# Patient Record
Sex: FEMALE | Race: WHITE | Employment: UNEMPLOYED | URBAN - METROPOLITAN AREA
[De-identification: names, ages, dates, MRNs, and addresses within clinical notes are randomized per-mention and may not be internally consistent; named-entity substitution may affect disease eponyms.]

---

## 2017-01-10 PROBLEM — E87.6 HYPOKALEMIA: Status: ACTIVE | Noted: 2017-01-10

## 2017-01-10 PROBLEM — Z72.0 TOBACCO ABUSE: Status: ACTIVE | Noted: 2017-01-10

## 2017-01-10 PROBLEM — N39.0 UTI (URINARY TRACT INFECTION): Status: ACTIVE | Noted: 2017-01-10

## 2017-01-10 PROBLEM — A41.9 SEPSIS (HCC): Status: ACTIVE | Noted: 2017-01-10

## 2017-01-10 PROBLEM — J96.21 ACUTE ON CHRONIC RESPIRATORY FAILURE WITH HYPOXIA (HCC): Status: ACTIVE | Noted: 2017-01-10

## 2017-01-10 PROBLEM — N17.9 AKI (ACUTE KIDNEY INJURY) (HCC): Status: ACTIVE | Noted: 2017-01-10

## 2017-01-12 PROBLEM — J96.01 ACUTE RESPIRATORY FAILURE WITH HYPOXIA (HCC): Status: ACTIVE | Noted: 2017-01-10

## 2017-01-19 ENCOUNTER — OFFICE VISIT (OUTPATIENT)
Dept: PAIN MANAGEMENT | Age: 72
End: 2017-01-19

## 2017-01-19 VITALS
SYSTOLIC BLOOD PRESSURE: 106 MMHG | WEIGHT: 154 LBS | BODY MASS INDEX: 26.43 KG/M2 | DIASTOLIC BLOOD PRESSURE: 62 MMHG | HEART RATE: 90 BPM

## 2017-01-19 DIAGNOSIS — M51.36 DDD (DEGENERATIVE DISC DISEASE), LUMBAR: ICD-10-CM

## 2017-01-19 DIAGNOSIS — M79.7 FIBROMYALGIA: ICD-10-CM

## 2017-01-19 DIAGNOSIS — G89.4 CHRONIC PAIN SYNDROME: ICD-10-CM

## 2017-01-19 DIAGNOSIS — M47.816 OSTEOARTHRITIS OF LUMBAR SPINE, UNSPECIFIED SPINAL OSTEOARTHRITIS COMPLICATION STATUS: ICD-10-CM

## 2017-01-19 PROCEDURE — 99213 OFFICE O/P EST LOW 20 MIN: CPT | Performed by: INTERNAL MEDICINE

## 2017-01-19 RX ORDER — DULOXETIN HYDROCHLORIDE 30 MG/1
CAPSULE, DELAYED RELEASE ORAL
Qty: 30 CAPSULE | Refills: 0 | Status: SHIPPED | OUTPATIENT
Start: 2017-01-19 | End: 2017-02-16 | Stop reason: SDUPTHER

## 2017-01-19 RX ORDER — AMITRIPTYLINE HYDROCHLORIDE 25 MG/1
TABLET, FILM COATED ORAL
Qty: 30 TABLET | Refills: 0 | Status: SHIPPED | OUTPATIENT
Start: 2017-01-19 | End: 2017-02-16 | Stop reason: SDUPTHER

## 2017-01-19 RX ORDER — TIZANIDINE 4 MG/1
TABLET ORAL
Qty: 30 TABLET | Refills: 0 | Status: SHIPPED | OUTPATIENT
Start: 2017-01-19 | End: 2017-02-16 | Stop reason: SDUPTHER

## 2017-01-19 RX ORDER — CALCIUM CARBONATE/VITAMIN D3 500-10/5ML
400 LIQUID (ML) ORAL 2 TIMES DAILY
Qty: 60 CAPSULE | Refills: 0 | Status: SHIPPED | OUTPATIENT
Start: 2017-01-19 | End: 2017-02-16 | Stop reason: SDUPTHER

## 2017-01-19 RX ORDER — DULOXETIN HYDROCHLORIDE 60 MG/1
CAPSULE, DELAYED RELEASE ORAL
Qty: 30 CAPSULE | Refills: 0 | Status: SHIPPED | OUTPATIENT
Start: 2017-01-19 | End: 2017-02-16 | Stop reason: SDUPTHER

## 2017-01-19 RX ORDER — MELOXICAM 15 MG/1
TABLET ORAL
Qty: 30 TABLET | Refills: 0 | Status: SHIPPED | OUTPATIENT
Start: 2017-01-19 | End: 2017-02-16 | Stop reason: SDUPTHER

## 2017-01-19 RX ORDER — GABAPENTIN 600 MG/1
600 TABLET ORAL 3 TIMES DAILY
Qty: 90 TABLET | Refills: 0 | Status: SHIPPED | OUTPATIENT
Start: 2017-01-19 | End: 2017-02-16 | Stop reason: SDUPTHER

## 2017-01-19 RX ORDER — OXYCODONE AND ACETAMINOPHEN 7.5; 325 MG/1; MG/1
1 TABLET ORAL EVERY 6 HOURS PRN
Qty: 112 TABLET | Refills: 0 | Status: SHIPPED | OUTPATIENT
Start: 2017-01-19 | End: 2017-02-16 | Stop reason: SDUPTHER

## 2017-01-23 ENCOUNTER — OFFICE VISIT (OUTPATIENT)
Dept: FAMILY MEDICINE CLINIC | Age: 72
End: 2017-01-23

## 2017-01-23 VITALS
OXYGEN SATURATION: 98 % | WEIGHT: 161 LBS | RESPIRATION RATE: 12 BRPM | DIASTOLIC BLOOD PRESSURE: 80 MMHG | SYSTOLIC BLOOD PRESSURE: 140 MMHG | BODY MASS INDEX: 27.64 KG/M2 | HEART RATE: 83 BPM

## 2017-01-23 DIAGNOSIS — J44.1 COPD EXACERBATION (HCC): Primary | ICD-10-CM

## 2017-01-23 DIAGNOSIS — R73.03 PREDIABETES: ICD-10-CM

## 2017-01-23 DIAGNOSIS — L60.0 INGROWN RIGHT GREATER TOENAIL: ICD-10-CM

## 2017-01-23 DIAGNOSIS — N39.0 URINARY TRACT INFECTION, SITE UNSPECIFIED: ICD-10-CM

## 2017-01-23 DIAGNOSIS — K21.9 GASTROESOPHAGEAL REFLUX DISEASE, ESOPHAGITIS PRESENCE NOT SPECIFIED: ICD-10-CM

## 2017-01-23 DIAGNOSIS — E78.00 HYPERCHOLESTEREMIA: ICD-10-CM

## 2017-01-23 LAB
BILIRUBIN, POC: NEGATIVE
BLOOD URINE, POC: ABNORMAL
CLARITY, POC: CLEAR
COLOR, POC: YELLOW
GLUCOSE URINE, POC: NEGATIVE
KETONES, POC: NEGATIVE
LEUKOCYTE EST, POC: ABNORMAL
NITRITE, POC: NEGATIVE
PH, POC: 7.5
PROTEIN, POC: NEGATIVE
SPECIFIC GRAVITY, POC: 1.01
UROBILINOGEN, POC: 1

## 2017-01-23 PROCEDURE — 99214 OFFICE O/P EST MOD 30 MIN: CPT | Performed by: NURSE PRACTITIONER

## 2017-01-23 PROCEDURE — 81002 URINALYSIS NONAUTO W/O SCOPE: CPT | Performed by: NURSE PRACTITIONER

## 2017-01-23 RX ORDER — ATORVASTATIN CALCIUM 80 MG/1
TABLET, FILM COATED ORAL
Qty: 30 TABLET | Refills: 2 | Status: SHIPPED | OUTPATIENT
Start: 2017-01-23 | End: 2017-06-13 | Stop reason: SDUPTHER

## 2017-01-23 RX ORDER — PANTOPRAZOLE SODIUM 20 MG/1
20 TABLET, DELAYED RELEASE ORAL DAILY
Qty: 30 TABLET | Refills: 1 | Status: SHIPPED | OUTPATIENT
Start: 2017-01-23 | End: 2017-03-24 | Stop reason: SDUPTHER

## 2017-01-23 ASSESSMENT — ENCOUNTER SYMPTOMS
VOMITING: 0
COUGH: 1
SHORTNESS OF BREATH: 1
ABDOMINAL PAIN: 0
DIARRHEA: 0
NAUSEA: 0

## 2017-01-24 LAB — URINE CULTURE, ROUTINE: NORMAL

## 2017-02-03 ENCOUNTER — TELEPHONE (OUTPATIENT)
Dept: FAMILY MEDICINE CLINIC | Age: 72
End: 2017-02-03

## 2017-02-06 DIAGNOSIS — J44.9 CHRONIC OBSTRUCTIVE PULMONARY DISEASE, UNSPECIFIED COPD TYPE (HCC): Primary | ICD-10-CM

## 2017-02-07 ENCOUNTER — OFFICE VISIT (OUTPATIENT)
Dept: PULMONOLOGY | Age: 72
End: 2017-02-07

## 2017-02-07 VITALS
SYSTOLIC BLOOD PRESSURE: 126 MMHG | HEART RATE: 87 BPM | TEMPERATURE: 98.1 F | DIASTOLIC BLOOD PRESSURE: 62 MMHG | WEIGHT: 159.8 LBS | BODY MASS INDEX: 27.28 KG/M2 | HEIGHT: 64 IN | RESPIRATION RATE: 20 BRPM | OXYGEN SATURATION: 92 %

## 2017-02-07 DIAGNOSIS — J44.9 COPD, SEVERE (HCC): Primary | ICD-10-CM

## 2017-02-07 DIAGNOSIS — F17.211 NICOTINE DEPENDENCE, CIGARETTES, IN REMISSION: ICD-10-CM

## 2017-02-07 PROCEDURE — 99214 OFFICE O/P EST MOD 30 MIN: CPT | Performed by: NURSE PRACTITIONER

## 2017-02-07 PROCEDURE — G0296 VISIT TO DETERM LDCT ELIG: HCPCS | Performed by: NURSE PRACTITIONER

## 2017-02-08 RX ORDER — FLUTICASONE FUROATE AND VILANTEROL 200; 25 UG/1; UG/1
1 POWDER RESPIRATORY (INHALATION) DAILY
Qty: 2 EACH | Refills: 0 | COMMUNITY
Start: 2017-02-08 | End: 2017-04-12 | Stop reason: SDUPTHER

## 2017-02-15 ENCOUNTER — HOSPITAL ENCOUNTER (OUTPATIENT)
Dept: CT IMAGING | Age: 72
Discharge: HOME | End: 2017-02-15
Attending: NURSE PRACTITIONER | Admitting: NURSE PRACTITIONER

## 2017-02-15 DIAGNOSIS — E78.00 HYPERCHOLESTEREMIA: ICD-10-CM

## 2017-02-15 DIAGNOSIS — J44.1 COPD EXACERBATION (HCC): ICD-10-CM

## 2017-02-15 DIAGNOSIS — F17.211 NICOTINE DEPENDENCE, CIGARETTES, IN REMISSION: ICD-10-CM

## 2017-02-15 DIAGNOSIS — R73.03 PREDIABETES: ICD-10-CM

## 2017-02-15 DIAGNOSIS — F17.211 CIGARETTE NICOTINE DEPENDENCE IN REMISSION: ICD-10-CM

## 2017-02-15 LAB
A/G RATIO: 1.9 (ref 1.1–2.2)
ALBUMIN SERPL-MCNC: 4.1 G/DL (ref 3.4–5)
ALP BLD-CCNC: 97 U/L (ref 40–129)
ALT SERPL-CCNC: 11 U/L (ref 10–40)
ANION GAP SERPL CALCULATED.3IONS-SCNC: 14 MMOL/L (ref 3–16)
AST SERPL-CCNC: 12 U/L (ref 15–37)
BASOPHILS ABSOLUTE: 0 K/UL (ref 0–0.2)
BASOPHILS RELATIVE PERCENT: 0.6 %
BILIRUB SERPL-MCNC: 0.5 MG/DL (ref 0–1)
BUN BLDV-MCNC: 18 MG/DL (ref 7–20)
CALCIUM SERPL-MCNC: 9.2 MG/DL (ref 8.3–10.6)
CHLORIDE BLD-SCNC: 104 MMOL/L (ref 99–110)
CHOLESTEROL, TOTAL: 188 MG/DL (ref 0–199)
CO2: 27 MMOL/L (ref 21–32)
CREAT SERPL-MCNC: 0.8 MG/DL (ref 0.6–1.2)
EOSINOPHILS ABSOLUTE: 0.1 K/UL (ref 0–0.6)
EOSINOPHILS RELATIVE PERCENT: 2.5 %
ESTIMATED AVERAGE GLUCOSE: 122.6 MG/DL
GFR AFRICAN AMERICAN: >60
GFR NON-AFRICAN AMERICAN: >60
GLOBULIN: 2.2 G/DL
GLUCOSE BLD-MCNC: 101 MG/DL (ref 70–99)
HBA1C MFR BLD: 5.9 %
HCT VFR BLD CALC: 38.9 % (ref 36–48)
HDLC SERPL-MCNC: 67 MG/DL (ref 40–60)
HEMOGLOBIN: 12.8 G/DL (ref 12–16)
LDL CHOLESTEROL CALCULATED: 97 MG/DL
LYMPHOCYTES ABSOLUTE: 1.5 K/UL (ref 1–5.1)
LYMPHOCYTES RELATIVE PERCENT: 26 %
MCH RBC QN AUTO: 31.4 PG (ref 26–34)
MCHC RBC AUTO-ENTMCNC: 32.9 G/DL (ref 31–36)
MCV RBC AUTO: 95.4 FL (ref 80–100)
MONOCYTES ABSOLUTE: 0.4 K/UL (ref 0–1.3)
MONOCYTES RELATIVE PERCENT: 6.3 %
NEUTROPHILS ABSOLUTE: 3.8 K/UL (ref 1.7–7.7)
NEUTROPHILS RELATIVE PERCENT: 64.6 %
PDW BLD-RTO: 14.4 % (ref 12.4–15.4)
PLATELET # BLD: 314 K/UL (ref 135–450)
PMV BLD AUTO: 7.1 FL (ref 5–10.5)
POTASSIUM SERPL-SCNC: 4.7 MMOL/L (ref 3.5–5.1)
RBC # BLD: 4.07 M/UL (ref 4–5.2)
SODIUM BLD-SCNC: 145 MMOL/L (ref 136–145)
TOTAL PROTEIN: 6.3 G/DL (ref 6.4–8.2)
TRIGL SERPL-MCNC: 118 MG/DL (ref 0–150)
VLDLC SERPL CALC-MCNC: 24 MG/DL
WBC # BLD: 5.9 K/UL (ref 4–11)

## 2017-02-16 ENCOUNTER — OFFICE VISIT (OUTPATIENT)
Dept: PAIN MANAGEMENT | Age: 72
End: 2017-02-16

## 2017-02-16 VITALS
HEART RATE: 82 BPM | BODY MASS INDEX: 27.64 KG/M2 | DIASTOLIC BLOOD PRESSURE: 79 MMHG | SYSTOLIC BLOOD PRESSURE: 135 MMHG | WEIGHT: 161 LBS

## 2017-02-16 DIAGNOSIS — M47.816 OSTEOARTHRITIS OF LUMBAR SPINE, UNSPECIFIED SPINAL OSTEOARTHRITIS COMPLICATION STATUS: ICD-10-CM

## 2017-02-16 DIAGNOSIS — M51.36 DDD (DEGENERATIVE DISC DISEASE), LUMBAR: ICD-10-CM

## 2017-02-16 DIAGNOSIS — G89.4 CHRONIC PAIN SYNDROME: ICD-10-CM

## 2017-02-16 DIAGNOSIS — M79.7 FIBROMYALGIA: ICD-10-CM

## 2017-02-16 PROCEDURE — 99213 OFFICE O/P EST LOW 20 MIN: CPT | Performed by: INTERNAL MEDICINE

## 2017-02-16 RX ORDER — CALCIUM CARBONATE/VITAMIN D3 500-10/5ML
400 LIQUID (ML) ORAL 2 TIMES DAILY
Qty: 60 CAPSULE | Refills: 0 | Status: SHIPPED | OUTPATIENT
Start: 2017-02-16 | End: 2017-03-17 | Stop reason: SDUPTHER

## 2017-02-16 RX ORDER — MELOXICAM 15 MG/1
TABLET ORAL
Qty: 30 TABLET | Refills: 0 | Status: SHIPPED | OUTPATIENT
Start: 2017-02-16 | End: 2017-03-17 | Stop reason: SDUPTHER

## 2017-02-16 RX ORDER — AMITRIPTYLINE HYDROCHLORIDE 25 MG/1
TABLET, FILM COATED ORAL
Qty: 30 TABLET | Refills: 0 | Status: SHIPPED | OUTPATIENT
Start: 2017-02-16 | End: 2017-03-17 | Stop reason: SDUPTHER

## 2017-02-16 RX ORDER — OXYCODONE AND ACETAMINOPHEN 7.5; 325 MG/1; MG/1
1 TABLET ORAL EVERY 6 HOURS PRN
Qty: 112 TABLET | Refills: 0 | Status: SHIPPED | OUTPATIENT
Start: 2017-02-16 | End: 2017-03-17 | Stop reason: SDUPTHER

## 2017-02-16 RX ORDER — TIZANIDINE 4 MG/1
TABLET ORAL
Qty: 30 TABLET | Refills: 0 | Status: SHIPPED | OUTPATIENT
Start: 2017-02-16 | End: 2017-03-17 | Stop reason: SDUPTHER

## 2017-02-16 RX ORDER — GABAPENTIN 600 MG/1
600 TABLET ORAL 3 TIMES DAILY
Qty: 90 TABLET | Refills: 0 | Status: SHIPPED | OUTPATIENT
Start: 2017-02-16 | End: 2017-03-17 | Stop reason: SDUPTHER

## 2017-02-16 RX ORDER — DULOXETIN HYDROCHLORIDE 30 MG/1
CAPSULE, DELAYED RELEASE ORAL
Qty: 30 CAPSULE | Refills: 0 | Status: SHIPPED | OUTPATIENT
Start: 2017-02-16 | End: 2017-03-17 | Stop reason: SDUPTHER

## 2017-02-16 RX ORDER — DULOXETIN HYDROCHLORIDE 60 MG/1
CAPSULE, DELAYED RELEASE ORAL
Qty: 30 CAPSULE | Refills: 0 | Status: SHIPPED | OUTPATIENT
Start: 2017-02-16 | End: 2017-03-17 | Stop reason: SDUPTHER

## 2017-02-17 ENCOUNTER — TELEPHONE (OUTPATIENT)
Dept: CASE MANAGEMENT | Age: 72
End: 2017-02-17

## 2017-02-24 ENCOUNTER — OFFICE VISIT (OUTPATIENT)
Dept: FAMILY MEDICINE CLINIC | Age: 72
End: 2017-02-24

## 2017-02-24 VITALS
WEIGHT: 161 LBS | RESPIRATION RATE: 12 BRPM | DIASTOLIC BLOOD PRESSURE: 70 MMHG | HEART RATE: 74 BPM | OXYGEN SATURATION: 94 % | SYSTOLIC BLOOD PRESSURE: 110 MMHG | BODY MASS INDEX: 27.64 KG/M2

## 2017-02-24 DIAGNOSIS — J96.11 CHRONIC HYPOXEMIC RESPIRATORY FAILURE (HCC): ICD-10-CM

## 2017-02-24 DIAGNOSIS — J44.9 COPD, SEVERE (HCC): Primary | ICD-10-CM

## 2017-02-24 PROBLEM — E87.6 HYPOKALEMIA: Status: RESOLVED | Noted: 2017-01-10 | Resolved: 2017-02-24

## 2017-02-24 PROBLEM — N39.0 UTI (URINARY TRACT INFECTION): Status: RESOLVED | Noted: 2017-01-10 | Resolved: 2017-02-24

## 2017-02-24 PROBLEM — J96.01 ACUTE RESPIRATORY FAILURE WITH HYPOXIA (HCC): Status: RESOLVED | Noted: 2017-01-10 | Resolved: 2017-02-24

## 2017-02-24 PROCEDURE — 99213 OFFICE O/P EST LOW 20 MIN: CPT | Performed by: NURSE PRACTITIONER

## 2017-02-24 ASSESSMENT — ENCOUNTER SYMPTOMS
CHEST TIGHTNESS: 0
SHORTNESS OF BREATH: 1
NAUSEA: 0
COUGH: 1
DIARRHEA: 0
CONSTIPATION: 0
WHEEZING: 0
VOMITING: 0

## 2017-02-28 ENCOUNTER — HOSPITAL ENCOUNTER (OUTPATIENT)
Dept: OTHER | Age: 72
Discharge: HOME | End: 2017-02-28
Attending: INTERNAL MEDICINE | Admitting: INTERNAL MEDICINE

## 2017-02-28 PROCEDURE — 94620 PR PULMONARY STRESS TESTING,SIMPLE: CPT | Performed by: INTERNAL MEDICINE

## 2017-03-02 ENCOUNTER — HOSPITAL ENCOUNTER (OUTPATIENT)
Dept: CARDIAC REHAB | Age: 72
Discharge: HOME | End: 2017-03-03
Admitting: INTERNAL MEDICINE

## 2017-03-02 VITALS — WEIGHT: 161.5 LBS | BODY MASS INDEX: 27.72 KG/M2

## 2017-03-03 ENCOUNTER — HOSPITAL ENCOUNTER (OUTPATIENT)
Dept: CARDIAC REHAB | Age: 72
Discharge: HOME | End: 2017-03-04
Admitting: INTERNAL MEDICINE

## 2017-03-03 VITALS — WEIGHT: 162.5 LBS | BODY MASS INDEX: 27.89 KG/M2

## 2017-03-07 ENCOUNTER — HOSPITAL ENCOUNTER (OUTPATIENT)
Dept: CARDIAC REHAB | Age: 72
Discharge: HOME | End: 2017-03-08
Admitting: INTERNAL MEDICINE

## 2017-03-07 VITALS — BODY MASS INDEX: 27.45 KG/M2 | WEIGHT: 159.9 LBS

## 2017-03-10 ENCOUNTER — HOSPITAL ENCOUNTER (OUTPATIENT)
Dept: CARDIAC REHAB | Age: 72
Discharge: HOME | End: 2017-03-11
Admitting: INTERNAL MEDICINE

## 2017-03-10 VITALS — WEIGHT: 160 LBS | BODY MASS INDEX: 27.46 KG/M2

## 2017-03-14 ENCOUNTER — HOSPITAL ENCOUNTER (OUTPATIENT)
Dept: CARDIAC REHAB | Age: 72
Discharge: HOME | End: 2017-03-15
Admitting: INTERNAL MEDICINE

## 2017-03-14 VITALS — BODY MASS INDEX: 27.84 KG/M2 | WEIGHT: 162.2 LBS

## 2017-03-17 ENCOUNTER — OFFICE VISIT (OUTPATIENT)
Dept: PAIN MANAGEMENT | Age: 72
End: 2017-03-17

## 2017-03-17 ENCOUNTER — HOSPITAL ENCOUNTER (OUTPATIENT)
Dept: CARDIAC REHAB | Age: 72
Discharge: HOME | End: 2017-03-18
Admitting: INTERNAL MEDICINE

## 2017-03-17 VITALS
SYSTOLIC BLOOD PRESSURE: 145 MMHG | BODY MASS INDEX: 27.64 KG/M2 | WEIGHT: 161 LBS | HEART RATE: 70 BPM | DIASTOLIC BLOOD PRESSURE: 78 MMHG

## 2017-03-17 VITALS — BODY MASS INDEX: 27.84 KG/M2 | WEIGHT: 162.2 LBS

## 2017-03-17 DIAGNOSIS — M51.36 DDD (DEGENERATIVE DISC DISEASE), LUMBAR: ICD-10-CM

## 2017-03-17 DIAGNOSIS — M79.7 FIBROMYALGIA: ICD-10-CM

## 2017-03-17 DIAGNOSIS — G89.4 CHRONIC PAIN SYNDROME: ICD-10-CM

## 2017-03-17 DIAGNOSIS — M47.816 OSTEOARTHRITIS OF LUMBAR SPINE, UNSPECIFIED SPINAL OSTEOARTHRITIS COMPLICATION STATUS: ICD-10-CM

## 2017-03-17 PROCEDURE — 99213 OFFICE O/P EST LOW 20 MIN: CPT | Performed by: INTERNAL MEDICINE

## 2017-03-17 RX ORDER — DULOXETIN HYDROCHLORIDE 30 MG/1
CAPSULE, DELAYED RELEASE ORAL
Qty: 30 CAPSULE | Refills: 0 | Status: SHIPPED | OUTPATIENT
Start: 2017-03-17 | End: 2017-04-13 | Stop reason: SDUPTHER

## 2017-03-17 RX ORDER — MELOXICAM 15 MG/1
TABLET ORAL
Qty: 30 TABLET | Refills: 0 | Status: SHIPPED | OUTPATIENT
Start: 2017-03-17 | End: 2017-04-13 | Stop reason: SDUPTHER

## 2017-03-17 RX ORDER — GABAPENTIN 600 MG/1
600 TABLET ORAL 3 TIMES DAILY
Qty: 90 TABLET | Refills: 0 | Status: SHIPPED | OUTPATIENT
Start: 2017-03-17 | End: 2017-04-13 | Stop reason: SDUPTHER

## 2017-03-17 RX ORDER — AMITRIPTYLINE HYDROCHLORIDE 25 MG/1
TABLET, FILM COATED ORAL
Qty: 30 TABLET | Refills: 0 | Status: SHIPPED | OUTPATIENT
Start: 2017-03-17 | End: 2017-04-13 | Stop reason: SDUPTHER

## 2017-03-17 RX ORDER — CALCIUM CARBONATE/VITAMIN D3 500-10/5ML
400 LIQUID (ML) ORAL 2 TIMES DAILY
Qty: 60 CAPSULE | Refills: 0 | Status: SHIPPED | OUTPATIENT
Start: 2017-03-17 | End: 2017-04-13 | Stop reason: SDUPTHER

## 2017-03-17 RX ORDER — OXYCODONE AND ACETAMINOPHEN 7.5; 325 MG/1; MG/1
1 TABLET ORAL EVERY 6 HOURS PRN
Qty: 112 TABLET | Refills: 0 | Status: SHIPPED | OUTPATIENT
Start: 2017-03-17 | End: 2017-04-13 | Stop reason: SDUPTHER

## 2017-03-17 RX ORDER — TIZANIDINE 4 MG/1
TABLET ORAL
Qty: 30 TABLET | Refills: 0 | Status: SHIPPED | OUTPATIENT
Start: 2017-03-17 | End: 2017-04-13 | Stop reason: SDUPTHER

## 2017-03-17 RX ORDER — DULOXETIN HYDROCHLORIDE 60 MG/1
CAPSULE, DELAYED RELEASE ORAL
Qty: 30 CAPSULE | Refills: 0 | Status: SHIPPED | OUTPATIENT
Start: 2017-03-17 | End: 2017-04-13 | Stop reason: SDUPTHER

## 2017-03-21 ENCOUNTER — HOSPITAL ENCOUNTER (OUTPATIENT)
Dept: CARDIAC REHAB | Age: 72
Discharge: HOME | End: 2017-03-22
Admitting: INTERNAL MEDICINE

## 2017-03-21 VITALS — BODY MASS INDEX: 27.53 KG/M2 | WEIGHT: 160.4 LBS

## 2017-03-24 ENCOUNTER — HOSPITAL ENCOUNTER (OUTPATIENT)
Dept: CARDIAC REHAB | Age: 72
Discharge: HOME | End: 2017-03-25
Admitting: INTERNAL MEDICINE

## 2017-03-24 VITALS — WEIGHT: 159.2 LBS | BODY MASS INDEX: 27.33 KG/M2

## 2017-03-28 ENCOUNTER — HOSPITAL ENCOUNTER (OUTPATIENT)
Dept: CARDIAC REHAB | Age: 72
Discharge: HOME | End: 2017-03-29
Admitting: INTERNAL MEDICINE

## 2017-03-28 VITALS — BODY MASS INDEX: 27.55 KG/M2 | WEIGHT: 160.5 LBS

## 2017-03-31 ENCOUNTER — HOSPITAL ENCOUNTER (OUTPATIENT)
Dept: CARDIAC REHAB | Age: 72
Discharge: HOME | End: 2017-04-01
Admitting: INTERNAL MEDICINE

## 2017-03-31 VITALS — BODY MASS INDEX: 27.65 KG/M2 | WEIGHT: 161.1 LBS

## 2017-04-04 ENCOUNTER — HOSPITAL ENCOUNTER (OUTPATIENT)
Dept: CARDIAC REHAB | Age: 72
Discharge: HOME | End: 2017-04-05
Admitting: INTERNAL MEDICINE

## 2017-04-04 VITALS — BODY MASS INDEX: 27.96 KG/M2 | WEIGHT: 162.9 LBS

## 2017-04-07 ENCOUNTER — HOSPITAL ENCOUNTER (OUTPATIENT)
Dept: CARDIAC REHAB | Age: 72
Discharge: HOME | End: 2017-04-08
Admitting: INTERNAL MEDICINE

## 2017-04-07 VITALS — BODY MASS INDEX: 27.6 KG/M2 | WEIGHT: 160.8 LBS

## 2017-04-11 ENCOUNTER — HOSPITAL ENCOUNTER (OUTPATIENT)
Dept: CARDIAC REHAB | Age: 72
Discharge: HOME | End: 2017-04-12
Admitting: INTERNAL MEDICINE

## 2017-04-11 VITALS — WEIGHT: 160.1 LBS | BODY MASS INDEX: 27.48 KG/M2

## 2017-04-12 ENCOUNTER — OFFICE VISIT (OUTPATIENT)
Dept: PULMONOLOGY | Age: 72
End: 2017-04-12

## 2017-04-12 VITALS
WEIGHT: 160 LBS | TEMPERATURE: 98.2 F | SYSTOLIC BLOOD PRESSURE: 140 MMHG | DIASTOLIC BLOOD PRESSURE: 80 MMHG | OXYGEN SATURATION: 97 % | HEIGHT: 64 IN | BODY MASS INDEX: 27.31 KG/M2 | RESPIRATION RATE: 18 BRPM

## 2017-04-12 DIAGNOSIS — J96.11 CHRONIC HYPOXEMIC RESPIRATORY FAILURE (HCC): ICD-10-CM

## 2017-04-12 DIAGNOSIS — J44.9 COPD, SEVERE (HCC): Primary | ICD-10-CM

## 2017-04-12 DIAGNOSIS — F17.211 NICOTINE DEPENDENCE, CIGARETTES, IN REMISSION: ICD-10-CM

## 2017-04-12 PROCEDURE — 99212 OFFICE O/P EST SF 10 MIN: CPT | Performed by: NURSE PRACTITIONER

## 2017-04-13 ENCOUNTER — OFFICE VISIT (OUTPATIENT)
Dept: PAIN MANAGEMENT | Age: 72
End: 2017-04-13

## 2017-04-13 VITALS
SYSTOLIC BLOOD PRESSURE: 157 MMHG | DIASTOLIC BLOOD PRESSURE: 78 MMHG | BODY MASS INDEX: 27.48 KG/M2 | HEART RATE: 73 BPM | WEIGHT: 160.1 LBS

## 2017-04-13 DIAGNOSIS — M47.816 OSTEOARTHRITIS OF LUMBAR SPINE, UNSPECIFIED SPINAL OSTEOARTHRITIS COMPLICATION STATUS: ICD-10-CM

## 2017-04-13 DIAGNOSIS — G89.4 CHRONIC PAIN SYNDROME: ICD-10-CM

## 2017-04-13 DIAGNOSIS — M79.7 FIBROMYALGIA: ICD-10-CM

## 2017-04-13 DIAGNOSIS — M51.36 DDD (DEGENERATIVE DISC DISEASE), LUMBAR: ICD-10-CM

## 2017-04-13 PROCEDURE — 99213 OFFICE O/P EST LOW 20 MIN: CPT | Performed by: INTERNAL MEDICINE

## 2017-04-13 RX ORDER — GABAPENTIN 600 MG/1
600 TABLET ORAL 3 TIMES DAILY
Qty: 90 TABLET | Refills: 0 | Status: SHIPPED | OUTPATIENT
Start: 2017-04-13 | End: 2017-05-12 | Stop reason: SDUPTHER

## 2017-04-13 RX ORDER — DULOXETIN HYDROCHLORIDE 60 MG/1
CAPSULE, DELAYED RELEASE ORAL
Qty: 30 CAPSULE | Refills: 0 | Status: SHIPPED | OUTPATIENT
Start: 2017-04-13 | End: 2017-05-12 | Stop reason: SDUPTHER

## 2017-04-13 RX ORDER — DULOXETIN HYDROCHLORIDE 30 MG/1
CAPSULE, DELAYED RELEASE ORAL
Qty: 30 CAPSULE | Refills: 0 | Status: SHIPPED | OUTPATIENT
Start: 2017-04-13 | End: 2017-05-12 | Stop reason: SDUPTHER

## 2017-04-13 RX ORDER — CALCIUM CARBONATE/VITAMIN D3 500-10/5ML
400 LIQUID (ML) ORAL 2 TIMES DAILY
Qty: 60 CAPSULE | Refills: 0 | Status: SHIPPED | OUTPATIENT
Start: 2017-04-13 | End: 2017-05-12 | Stop reason: SDUPTHER

## 2017-04-13 RX ORDER — MELOXICAM 15 MG/1
TABLET ORAL
Qty: 30 TABLET | Refills: 0 | Status: SHIPPED | OUTPATIENT
Start: 2017-04-13 | End: 2017-05-12 | Stop reason: SDUPTHER

## 2017-04-13 RX ORDER — OXYCODONE AND ACETAMINOPHEN 7.5; 325 MG/1; MG/1
1 TABLET ORAL EVERY 6 HOURS PRN
Qty: 112 TABLET | Refills: 0 | Status: SHIPPED | OUTPATIENT
Start: 2017-04-13 | End: 2017-05-12 | Stop reason: SDUPTHER

## 2017-04-13 RX ORDER — AMITRIPTYLINE HYDROCHLORIDE 25 MG/1
TABLET, FILM COATED ORAL
Qty: 30 TABLET | Refills: 0 | Status: SHIPPED | OUTPATIENT
Start: 2017-04-13 | End: 2017-05-12 | Stop reason: SDUPTHER

## 2017-04-13 RX ORDER — TIZANIDINE 4 MG/1
TABLET ORAL
Qty: 30 TABLET | Refills: 0 | Status: SHIPPED | OUTPATIENT
Start: 2017-04-13 | End: 2017-05-12 | Stop reason: SDUPTHER

## 2017-04-18 ENCOUNTER — HOSPITAL ENCOUNTER (OUTPATIENT)
Dept: CARDIAC REHAB | Age: 72
Discharge: HOME | End: 2017-04-19
Admitting: INTERNAL MEDICINE

## 2017-04-18 VITALS — BODY MASS INDEX: 27.81 KG/M2 | WEIGHT: 162 LBS

## 2017-04-21 ENCOUNTER — HOSPITAL ENCOUNTER (OUTPATIENT)
Dept: CARDIAC REHAB | Age: 72
Discharge: HOME | End: 2017-04-22
Admitting: INTERNAL MEDICINE

## 2017-04-21 VITALS — BODY MASS INDEX: 27.79 KG/M2 | WEIGHT: 161.9 LBS

## 2017-04-25 ENCOUNTER — HOSPITAL ENCOUNTER (OUTPATIENT)
Dept: CARDIAC REHAB | Age: 72
Discharge: HOME | End: 2017-04-26
Admitting: INTERNAL MEDICINE

## 2017-04-25 VITALS — BODY MASS INDEX: 27.31 KG/M2 | WEIGHT: 159.1 LBS

## 2017-05-02 ENCOUNTER — HOSPITAL ENCOUNTER (OUTPATIENT)
Dept: CARDIAC REHAB | Age: 72
Discharge: HOME | End: 2017-05-03
Admitting: INTERNAL MEDICINE

## 2017-05-05 ENCOUNTER — HOSPITAL ENCOUNTER (OUTPATIENT)
Dept: CARDIAC REHAB | Age: 72
Discharge: HOME | End: 2017-05-06
Admitting: INTERNAL MEDICINE

## 2017-05-05 VITALS — WEIGHT: 165.4 LBS | BODY MASS INDEX: 28.39 KG/M2

## 2017-05-09 ENCOUNTER — HOSPITAL ENCOUNTER (OUTPATIENT)
Dept: CARDIAC REHAB | Age: 72
Discharge: HOME | End: 2017-05-10
Admitting: INTERNAL MEDICINE

## 2017-05-09 VITALS — BODY MASS INDEX: 27.55 KG/M2 | WEIGHT: 160.5 LBS

## 2017-05-12 ENCOUNTER — OFFICE VISIT (OUTPATIENT)
Dept: PAIN MANAGEMENT | Age: 72
End: 2017-05-12

## 2017-05-12 ENCOUNTER — HOSPITAL ENCOUNTER (OUTPATIENT)
Dept: CARDIAC REHAB | Age: 72
Discharge: HOME | End: 2017-05-13
Admitting: INTERNAL MEDICINE

## 2017-05-12 VITALS
HEART RATE: 79 BPM | WEIGHT: 160 LBS | SYSTOLIC BLOOD PRESSURE: 140 MMHG | BODY MASS INDEX: 27.46 KG/M2 | DIASTOLIC BLOOD PRESSURE: 82 MMHG

## 2017-05-12 VITALS — WEIGHT: 161.6 LBS | BODY MASS INDEX: 27.74 KG/M2

## 2017-05-12 DIAGNOSIS — G89.4 CHRONIC PAIN SYNDROME: ICD-10-CM

## 2017-05-12 DIAGNOSIS — M47.816 OSTEOARTHRITIS OF LUMBAR SPINE, UNSPECIFIED SPINAL OSTEOARTHRITIS COMPLICATION STATUS: ICD-10-CM

## 2017-05-12 DIAGNOSIS — M51.36 DDD (DEGENERATIVE DISC DISEASE), LUMBAR: ICD-10-CM

## 2017-05-12 DIAGNOSIS — M79.7 FIBROMYALGIA: ICD-10-CM

## 2017-05-12 PROCEDURE — 99213 OFFICE O/P EST LOW 20 MIN: CPT | Performed by: INTERNAL MEDICINE

## 2017-05-12 RX ORDER — GABAPENTIN 600 MG/1
600 TABLET ORAL 3 TIMES DAILY
Qty: 90 TABLET | Refills: 0 | Status: SHIPPED | OUTPATIENT
Start: 2017-05-12 | End: 2017-06-12 | Stop reason: SDUPTHER

## 2017-05-12 RX ORDER — TIZANIDINE 4 MG/1
TABLET ORAL
Qty: 30 TABLET | Refills: 0 | Status: SHIPPED | OUTPATIENT
Start: 2017-05-12 | End: 2017-06-12 | Stop reason: SDUPTHER

## 2017-05-12 RX ORDER — MELOXICAM 15 MG/1
TABLET ORAL
Qty: 30 TABLET | Refills: 0 | Status: SHIPPED | OUTPATIENT
Start: 2017-05-12 | End: 2017-06-12 | Stop reason: SDUPTHER

## 2017-05-12 RX ORDER — DULOXETIN HYDROCHLORIDE 30 MG/1
CAPSULE, DELAYED RELEASE ORAL
Qty: 30 CAPSULE | Refills: 0 | Status: SHIPPED | OUTPATIENT
Start: 2017-05-12 | End: 2017-06-12 | Stop reason: SDUPTHER

## 2017-05-12 RX ORDER — AMITRIPTYLINE HYDROCHLORIDE 25 MG/1
TABLET, FILM COATED ORAL
Qty: 30 TABLET | Refills: 0 | Status: SHIPPED | OUTPATIENT
Start: 2017-05-12 | End: 2017-06-12 | Stop reason: SDUPTHER

## 2017-05-12 RX ORDER — CALCIUM CARBONATE/VITAMIN D3 500-10/5ML
400 LIQUID (ML) ORAL 2 TIMES DAILY
Qty: 60 CAPSULE | Refills: 0 | Status: SHIPPED | OUTPATIENT
Start: 2017-05-12 | End: 2017-06-12 | Stop reason: SDUPTHER

## 2017-05-12 RX ORDER — DULOXETIN HYDROCHLORIDE 60 MG/1
CAPSULE, DELAYED RELEASE ORAL
Qty: 30 CAPSULE | Refills: 0 | Status: SHIPPED | OUTPATIENT
Start: 2017-05-12 | End: 2017-07-10 | Stop reason: SDUPTHER

## 2017-05-12 RX ORDER — OXYCODONE AND ACETAMINOPHEN 7.5; 325 MG/1; MG/1
1 TABLET ORAL EVERY 6 HOURS PRN
Qty: 120 TABLET | Refills: 0 | Status: SHIPPED | OUTPATIENT
Start: 2017-05-12 | End: 2017-06-12 | Stop reason: SDUPTHER

## 2017-05-16 ENCOUNTER — HOSPITAL ENCOUNTER (OUTPATIENT)
Dept: CARDIAC REHAB | Age: 72
Discharge: HOME | End: 2017-05-17
Admitting: INTERNAL MEDICINE

## 2017-05-16 VITALS — WEIGHT: 162.1 LBS | BODY MASS INDEX: 27.82 KG/M2

## 2017-05-19 ENCOUNTER — HOSPITAL ENCOUNTER (OUTPATIENT)
Dept: CARDIAC REHAB | Age: 72
Discharge: HOME | End: 2017-05-20
Admitting: INTERNAL MEDICINE

## 2017-05-23 ENCOUNTER — HOSPITAL ENCOUNTER (OUTPATIENT)
Dept: CARDIAC REHAB | Age: 72
Discharge: HOME | End: 2017-05-24
Admitting: INTERNAL MEDICINE

## 2017-05-23 VITALS — WEIGHT: 164.8 LBS | BODY MASS INDEX: 28.29 KG/M2

## 2017-05-30 ENCOUNTER — HOSPITAL ENCOUNTER (OUTPATIENT)
Dept: CARDIAC REHAB | Age: 72
Discharge: HOME | End: 2017-05-31
Admitting: INTERNAL MEDICINE

## 2017-05-30 VITALS — BODY MASS INDEX: 27.89 KG/M2 | WEIGHT: 162.5 LBS

## 2017-06-02 ENCOUNTER — HOSPITAL ENCOUNTER (OUTPATIENT)
Dept: CARDIAC REHAB | Age: 72
Discharge: HOME | End: 2017-06-03
Admitting: INTERNAL MEDICINE

## 2017-06-12 ENCOUNTER — OFFICE VISIT (OUTPATIENT)
Dept: PAIN MANAGEMENT | Age: 72
End: 2017-06-12

## 2017-06-12 VITALS
SYSTOLIC BLOOD PRESSURE: 115 MMHG | WEIGHT: 162 LBS | BODY MASS INDEX: 27.81 KG/M2 | HEART RATE: 70 BPM | DIASTOLIC BLOOD PRESSURE: 57 MMHG

## 2017-06-12 DIAGNOSIS — M47.26 OSTEOARTHRITIS OF SPINE WITH RADICULOPATHY, LUMBAR REGION: ICD-10-CM

## 2017-06-12 DIAGNOSIS — M47.816 OSTEOARTHRITIS OF LUMBAR SPINE, UNSPECIFIED SPINAL OSTEOARTHRITIS COMPLICATION STATUS: ICD-10-CM

## 2017-06-12 DIAGNOSIS — M79.7 FIBROMYALGIA: ICD-10-CM

## 2017-06-12 DIAGNOSIS — G89.4 CHRONIC PAIN SYNDROME: ICD-10-CM

## 2017-06-12 DIAGNOSIS — M51.36 DDD (DEGENERATIVE DISC DISEASE), LUMBAR: ICD-10-CM

## 2017-06-12 PROCEDURE — 99213 OFFICE O/P EST LOW 20 MIN: CPT | Performed by: INTERNAL MEDICINE

## 2017-06-12 RX ORDER — DULOXETIN HYDROCHLORIDE 30 MG/1
CAPSULE, DELAYED RELEASE ORAL
Qty: 30 CAPSULE | Refills: 0 | Status: SHIPPED | OUTPATIENT
Start: 2017-06-12 | End: 2017-07-10 | Stop reason: SDUPTHER

## 2017-06-12 RX ORDER — CALCIUM CARBONATE/VITAMIN D3 500-10/5ML
400 LIQUID (ML) ORAL 2 TIMES DAILY
Qty: 60 CAPSULE | Refills: 0 | Status: SHIPPED | OUTPATIENT
Start: 2017-06-12 | End: 2017-07-10 | Stop reason: SDUPTHER

## 2017-06-12 RX ORDER — MELOXICAM 15 MG/1
TABLET ORAL
Qty: 30 TABLET | Refills: 0 | Status: SHIPPED | OUTPATIENT
Start: 2017-06-12 | End: 2017-07-10 | Stop reason: SDUPTHER

## 2017-06-12 RX ORDER — OXYCODONE AND ACETAMINOPHEN 7.5; 325 MG/1; MG/1
1 TABLET ORAL EVERY 6 HOURS PRN
Qty: 112 TABLET | Refills: 0 | Status: SHIPPED | OUTPATIENT
Start: 2017-06-12 | End: 2017-07-10 | Stop reason: SDUPTHER

## 2017-06-12 RX ORDER — AMITRIPTYLINE HYDROCHLORIDE 25 MG/1
TABLET, FILM COATED ORAL
Qty: 30 TABLET | Refills: 0 | Status: SHIPPED | OUTPATIENT
Start: 2017-06-12 | End: 2017-07-10 | Stop reason: SDUPTHER

## 2017-06-12 RX ORDER — GABAPENTIN 600 MG/1
600 TABLET ORAL 3 TIMES DAILY
Qty: 90 TABLET | Refills: 0 | Status: SHIPPED | OUTPATIENT
Start: 2017-06-12 | End: 2017-07-10 | Stop reason: SDUPTHER

## 2017-06-12 RX ORDER — TIZANIDINE 4 MG/1
TABLET ORAL
Qty: 30 TABLET | Refills: 0 | Status: SHIPPED | OUTPATIENT
Start: 2017-06-12 | End: 2017-07-10 | Stop reason: SDUPTHER

## 2017-06-13 ENCOUNTER — OFFICE VISIT (OUTPATIENT)
Dept: FAMILY MEDICINE CLINIC | Age: 72
End: 2017-06-13

## 2017-06-13 VITALS
HEIGHT: 65 IN | DIASTOLIC BLOOD PRESSURE: 70 MMHG | RESPIRATION RATE: 20 BRPM | SYSTOLIC BLOOD PRESSURE: 118 MMHG | HEART RATE: 68 BPM | BODY MASS INDEX: 27.32 KG/M2 | WEIGHT: 164 LBS | TEMPERATURE: 98.1 F

## 2017-06-13 DIAGNOSIS — K13.70 MOUTH LESION: ICD-10-CM

## 2017-06-13 DIAGNOSIS — B37.0 THRUSH: Primary | ICD-10-CM

## 2017-06-13 DIAGNOSIS — E78.00 HYPERCHOLESTEREMIA: ICD-10-CM

## 2017-06-13 PROCEDURE — 99214 OFFICE O/P EST MOD 30 MIN: CPT | Performed by: NURSE PRACTITIONER

## 2017-06-13 RX ORDER — ATORVASTATIN CALCIUM 80 MG/1
TABLET, FILM COATED ORAL
Qty: 30 TABLET | Refills: 2 | Status: SHIPPED | OUTPATIENT
Start: 2017-06-13 | End: 2017-10-06 | Stop reason: SDUPTHER

## 2017-06-13 ASSESSMENT — ENCOUNTER SYMPTOMS
NAUSEA: 0
DIARRHEA: 0
VOMITING: 0
SHORTNESS OF BREATH: 0
CONSTIPATION: 0
SORE THROAT: 0
CHEST TIGHTNESS: 0

## 2017-06-30 DIAGNOSIS — K21.9 GASTROESOPHAGEAL REFLUX DISEASE, ESOPHAGITIS PRESENCE NOT SPECIFIED: ICD-10-CM

## 2017-06-30 RX ORDER — PANTOPRAZOLE SODIUM 20 MG/1
TABLET, DELAYED RELEASE ORAL
Qty: 30 TABLET | Refills: 2 | Status: SHIPPED | OUTPATIENT
Start: 2017-06-30 | End: 2017-10-06 | Stop reason: SDUPTHER

## 2017-07-03 ENCOUNTER — TELEPHONE (OUTPATIENT)
Dept: FAMILY MEDICINE CLINIC | Age: 72
End: 2017-07-03

## 2017-07-03 DIAGNOSIS — B37.0 THRUSH: ICD-10-CM

## 2017-07-10 ENCOUNTER — OFFICE VISIT (OUTPATIENT)
Dept: PAIN MANAGEMENT | Age: 72
End: 2017-07-10

## 2017-07-10 VITALS
WEIGHT: 162.8 LBS | HEART RATE: 66 BPM | DIASTOLIC BLOOD PRESSURE: 72 MMHG | SYSTOLIC BLOOD PRESSURE: 152 MMHG | BODY MASS INDEX: 27.51 KG/M2

## 2017-07-10 DIAGNOSIS — M79.7 FIBROMYALGIA: ICD-10-CM

## 2017-07-10 DIAGNOSIS — M47.26 OSTEOARTHRITIS OF SPINE WITH RADICULOPATHY, LUMBAR REGION: ICD-10-CM

## 2017-07-10 DIAGNOSIS — M51.36 DDD (DEGENERATIVE DISC DISEASE), LUMBAR: ICD-10-CM

## 2017-07-10 DIAGNOSIS — G89.4 CHRONIC PAIN SYNDROME: ICD-10-CM

## 2017-07-10 DIAGNOSIS — M47.816 OSTEOARTHRITIS OF LUMBAR SPINE, UNSPECIFIED SPINAL OSTEOARTHRITIS COMPLICATION STATUS: ICD-10-CM

## 2017-07-10 PROCEDURE — 99213 OFFICE O/P EST LOW 20 MIN: CPT | Performed by: INTERNAL MEDICINE

## 2017-07-10 RX ORDER — MELOXICAM 15 MG/1
TABLET ORAL
Qty: 30 TABLET | Refills: 0 | Status: SHIPPED | OUTPATIENT
Start: 2017-07-10 | End: 2017-08-08 | Stop reason: SDUPTHER

## 2017-07-10 RX ORDER — DULOXETIN HYDROCHLORIDE 30 MG/1
CAPSULE, DELAYED RELEASE ORAL
Qty: 30 CAPSULE | Refills: 0 | Status: SHIPPED | OUTPATIENT
Start: 2017-07-10 | End: 2017-08-08 | Stop reason: SDUPTHER

## 2017-07-10 RX ORDER — OXYCODONE AND ACETAMINOPHEN 7.5; 325 MG/1; MG/1
1 TABLET ORAL EVERY 6 HOURS PRN
Qty: 112 TABLET | Refills: 0 | Status: SHIPPED | OUTPATIENT
Start: 2017-07-10 | End: 2017-08-08 | Stop reason: SDUPTHER

## 2017-07-10 RX ORDER — GABAPENTIN 600 MG/1
600 TABLET ORAL 3 TIMES DAILY
Qty: 90 TABLET | Refills: 0 | Status: SHIPPED | OUTPATIENT
Start: 2017-07-10 | End: 2017-08-08 | Stop reason: SDUPTHER

## 2017-07-10 RX ORDER — DULOXETIN HYDROCHLORIDE 60 MG/1
CAPSULE, DELAYED RELEASE ORAL
Qty: 30 CAPSULE | Refills: 0 | Status: SHIPPED | OUTPATIENT
Start: 2017-07-10 | End: 2017-09-08

## 2017-07-10 RX ORDER — CALCIUM CARBONATE/VITAMIN D3 500-10/5ML
400 LIQUID (ML) ORAL 2 TIMES DAILY
Qty: 60 CAPSULE | Refills: 0 | Status: SHIPPED | OUTPATIENT
Start: 2017-07-10 | End: 2017-08-08 | Stop reason: SDUPTHER

## 2017-07-10 RX ORDER — TIZANIDINE 4 MG/1
TABLET ORAL
Qty: 30 TABLET | Refills: 0 | Status: SHIPPED | OUTPATIENT
Start: 2017-07-10 | End: 2017-08-08 | Stop reason: SDUPTHER

## 2017-07-10 RX ORDER — AMITRIPTYLINE HYDROCHLORIDE 25 MG/1
TABLET, FILM COATED ORAL
Qty: 30 TABLET | Refills: 0 | Status: SHIPPED | OUTPATIENT
Start: 2017-07-10 | End: 2017-08-08 | Stop reason: SDUPTHER

## 2017-08-08 ENCOUNTER — OFFICE VISIT (OUTPATIENT)
Dept: PAIN MANAGEMENT | Age: 72
End: 2017-08-08

## 2017-08-08 VITALS
WEIGHT: 158 LBS | DIASTOLIC BLOOD PRESSURE: 87 MMHG | BODY MASS INDEX: 26.7 KG/M2 | SYSTOLIC BLOOD PRESSURE: 158 MMHG | HEART RATE: 87 BPM

## 2017-08-08 DIAGNOSIS — M51.36 DDD (DEGENERATIVE DISC DISEASE), LUMBAR: ICD-10-CM

## 2017-08-08 DIAGNOSIS — M47.816 OSTEOARTHRITIS OF LUMBAR SPINE, UNSPECIFIED SPINAL OSTEOARTHRITIS COMPLICATION STATUS: ICD-10-CM

## 2017-08-08 DIAGNOSIS — G89.4 CHRONIC PAIN SYNDROME: ICD-10-CM

## 2017-08-08 DIAGNOSIS — M79.7 FIBROMYALGIA: ICD-10-CM

## 2017-08-08 DIAGNOSIS — M47.26 OSTEOARTHRITIS OF SPINE WITH RADICULOPATHY, LUMBAR REGION: ICD-10-CM

## 2017-08-08 PROCEDURE — 99213 OFFICE O/P EST LOW 20 MIN: CPT | Performed by: INTERNAL MEDICINE

## 2017-08-08 RX ORDER — MELOXICAM 15 MG/1
TABLET ORAL
Qty: 30 TABLET | Refills: 1 | Status: SHIPPED | OUTPATIENT
Start: 2017-08-08 | End: 2017-09-08 | Stop reason: SDUPTHER

## 2017-08-08 RX ORDER — AMITRIPTYLINE HYDROCHLORIDE 25 MG/1
TABLET, FILM COATED ORAL
Qty: 30 TABLET | Refills: 1 | Status: SHIPPED | OUTPATIENT
Start: 2017-08-08 | End: 2017-09-08 | Stop reason: SDUPTHER

## 2017-08-08 RX ORDER — OXYCODONE AND ACETAMINOPHEN 7.5; 325 MG/1; MG/1
1 TABLET ORAL EVERY 6 HOURS PRN
Qty: 120 TABLET | Refills: 0 | Status: SHIPPED | OUTPATIENT
Start: 2017-08-08 | End: 2017-09-08 | Stop reason: SDUPTHER

## 2017-08-08 RX ORDER — TIZANIDINE 4 MG/1
TABLET ORAL
Qty: 30 TABLET | Refills: 1 | Status: SHIPPED | OUTPATIENT
Start: 2017-08-08 | End: 2017-09-08 | Stop reason: SDUPTHER

## 2017-08-08 RX ORDER — CALCIUM CARBONATE/VITAMIN D3 500-10/5ML
400 LIQUID (ML) ORAL 2 TIMES DAILY
Qty: 60 CAPSULE | Refills: 1 | Status: SHIPPED | OUTPATIENT
Start: 2017-08-08 | End: 2017-09-08 | Stop reason: SDUPTHER

## 2017-08-08 RX ORDER — DULOXETIN HYDROCHLORIDE 30 MG/1
CAPSULE, DELAYED RELEASE ORAL
Qty: 30 CAPSULE | Refills: 1 | Status: SHIPPED | OUTPATIENT
Start: 2017-08-08 | End: 2017-09-08 | Stop reason: SDUPTHER

## 2017-08-08 RX ORDER — GABAPENTIN 600 MG/1
600 TABLET ORAL 3 TIMES DAILY
Qty: 90 TABLET | Refills: 1 | Status: SHIPPED | OUTPATIENT
Start: 2017-08-08 | End: 2017-09-08 | Stop reason: SDUPTHER

## 2017-08-28 ENCOUNTER — OFFICE VISIT (OUTPATIENT)
Dept: FAMILY MEDICINE CLINIC | Age: 72
End: 2017-08-28

## 2017-08-28 VITALS
HEART RATE: 76 BPM | SYSTOLIC BLOOD PRESSURE: 110 MMHG | RESPIRATION RATE: 12 BRPM | DIASTOLIC BLOOD PRESSURE: 70 MMHG | WEIGHT: 155 LBS | BODY MASS INDEX: 26.19 KG/M2

## 2017-08-28 DIAGNOSIS — Z12.31 ENCOUNTER FOR SCREENING MAMMOGRAM FOR BREAST CANCER: ICD-10-CM

## 2017-08-28 DIAGNOSIS — K21.9 GASTROESOPHAGEAL REFLUX DISEASE WITHOUT ESOPHAGITIS: ICD-10-CM

## 2017-08-28 DIAGNOSIS — M85.89 OSTEOPENIA OF MULTIPLE SITES: ICD-10-CM

## 2017-08-28 DIAGNOSIS — Z72.0 TOBACCO ABUSE: ICD-10-CM

## 2017-08-28 DIAGNOSIS — J44.9 COPD, SEVERE (HCC): ICD-10-CM

## 2017-08-28 DIAGNOSIS — E78.00 HYPERCHOLESTEREMIA: Primary | ICD-10-CM

## 2017-08-28 DIAGNOSIS — F51.01 PRIMARY INSOMNIA: ICD-10-CM

## 2017-08-28 DIAGNOSIS — R73.03 PREDIABETES: ICD-10-CM

## 2017-08-28 DIAGNOSIS — E55.9 VITAMIN D DEFICIENCY: ICD-10-CM

## 2017-08-28 PROCEDURE — 99214 OFFICE O/P EST MOD 30 MIN: CPT | Performed by: NURSE PRACTITIONER

## 2017-08-28 RX ORDER — PHENOL 1.4 %
1 AEROSOL, SPRAY (ML) MUCOUS MEMBRANE 2 TIMES DAILY
Status: ON HOLD | COMMUNITY
End: 2019-03-30

## 2017-08-28 RX ORDER — ERGOCALCIFEROL 1.25 MG/1
CAPSULE ORAL
Qty: 24 CAPSULE | Refills: 1 | Status: ON HOLD | OUTPATIENT
Start: 2017-08-28 | End: 2019-03-30

## 2017-08-28 ASSESSMENT — ENCOUNTER SYMPTOMS
NAUSEA: 0
VOMITING: 0
CHEST TIGHTNESS: 0
CONSTIPATION: 0
DIARRHEA: 0
SHORTNESS OF BREATH: 1

## 2017-08-28 ASSESSMENT — PATIENT HEALTH QUESTIONNAIRE - PHQ9
1. LITTLE INTEREST OR PLEASURE IN DOING THINGS: 0
SUM OF ALL RESPONSES TO PHQ9 QUESTIONS 1 & 2: 0
SUM OF ALL RESPONSES TO PHQ QUESTIONS 1-9: 0
2. FEELING DOWN, DEPRESSED OR HOPELESS: 0

## 2017-09-06 RX ORDER — FLUTICASONE FUROATE AND VILANTEROL 200; 25 UG/1; UG/1
POWDER RESPIRATORY (INHALATION)
Qty: 28 EACH | Refills: 1 | Status: SHIPPED | OUTPATIENT
Start: 2017-09-06 | End: 2018-01-18 | Stop reason: SDUPTHER

## 2017-09-07 DIAGNOSIS — E78.00 HYPERCHOLESTEREMIA: ICD-10-CM

## 2017-09-07 DIAGNOSIS — R73.03 PREDIABETES: ICD-10-CM

## 2017-09-07 DIAGNOSIS — M85.89 OSTEOPENIA OF MULTIPLE SITES: ICD-10-CM

## 2017-09-07 LAB
A/G RATIO: 1.7 (ref 1.1–2.2)
ALBUMIN SERPL-MCNC: 4 G/DL (ref 3.4–5)
ALP BLD-CCNC: 110 U/L (ref 40–129)
ALT SERPL-CCNC: 10 U/L (ref 10–40)
ANION GAP SERPL CALCULATED.3IONS-SCNC: 14 MMOL/L (ref 3–16)
AST SERPL-CCNC: 11 U/L (ref 15–37)
BILIRUB SERPL-MCNC: 0.4 MG/DL (ref 0–1)
BUN BLDV-MCNC: 16 MG/DL (ref 7–20)
CALCIUM SERPL-MCNC: 8.9 MG/DL (ref 8.3–10.6)
CHLORIDE BLD-SCNC: 101 MMOL/L (ref 99–110)
CHOLESTEROL, TOTAL: 148 MG/DL (ref 0–199)
CO2: 25 MMOL/L (ref 21–32)
CREAT SERPL-MCNC: 0.8 MG/DL (ref 0.6–1.2)
GFR AFRICAN AMERICAN: >60
GFR NON-AFRICAN AMERICAN: >60
GLOBULIN: 2.4 G/DL
GLUCOSE BLD-MCNC: 106 MG/DL (ref 70–99)
HDLC SERPL-MCNC: 51 MG/DL (ref 40–60)
LDL CHOLESTEROL CALCULATED: 79 MG/DL
POTASSIUM SERPL-SCNC: 4.7 MMOL/L (ref 3.5–5.1)
SODIUM BLD-SCNC: 140 MMOL/L (ref 136–145)
TOTAL PROTEIN: 6.4 G/DL (ref 6.4–8.2)
TRIGL SERPL-MCNC: 89 MG/DL (ref 0–150)
VITAMIN D 25-HYDROXY: 29.2 NG/ML
VLDLC SERPL CALC-MCNC: 18 MG/DL

## 2017-09-08 ENCOUNTER — OFFICE VISIT (OUTPATIENT)
Dept: PAIN MANAGEMENT | Age: 72
End: 2017-09-08

## 2017-09-08 VITALS
BODY MASS INDEX: 26.19 KG/M2 | DIASTOLIC BLOOD PRESSURE: 72 MMHG | SYSTOLIC BLOOD PRESSURE: 129 MMHG | HEART RATE: 71 BPM | WEIGHT: 155 LBS

## 2017-09-08 DIAGNOSIS — M51.36 DDD (DEGENERATIVE DISC DISEASE), LUMBAR: ICD-10-CM

## 2017-09-08 DIAGNOSIS — M79.7 FIBROMYALGIA: ICD-10-CM

## 2017-09-08 DIAGNOSIS — M47.26 OSTEOARTHRITIS OF SPINE WITH RADICULOPATHY, LUMBAR REGION: ICD-10-CM

## 2017-09-08 DIAGNOSIS — G89.4 CHRONIC PAIN SYNDROME: ICD-10-CM

## 2017-09-08 DIAGNOSIS — M47.816 OSTEOARTHRITIS OF LUMBAR SPINE, UNSPECIFIED SPINAL OSTEOARTHRITIS COMPLICATION STATUS: ICD-10-CM

## 2017-09-08 LAB
ESTIMATED AVERAGE GLUCOSE: 128.4 MG/DL
HBA1C MFR BLD: 6.1 %

## 2017-09-08 PROCEDURE — 99213 OFFICE O/P EST LOW 20 MIN: CPT | Performed by: INTERNAL MEDICINE

## 2017-09-08 RX ORDER — MELOXICAM 15 MG/1
TABLET ORAL
Qty: 30 TABLET | Refills: 1 | Status: SHIPPED | OUTPATIENT
Start: 2017-09-08 | End: 2017-10-06 | Stop reason: SDUPTHER

## 2017-09-08 RX ORDER — TIZANIDINE 4 MG/1
TABLET ORAL
Qty: 30 TABLET | Refills: 1 | Status: SHIPPED | OUTPATIENT
Start: 2017-09-08 | End: 2017-10-06 | Stop reason: SDUPTHER

## 2017-09-08 RX ORDER — DULOXETIN HYDROCHLORIDE 30 MG/1
CAPSULE, DELAYED RELEASE ORAL
Qty: 90 CAPSULE | Refills: 1 | Status: SHIPPED | OUTPATIENT
Start: 2017-09-08 | End: 2017-09-08

## 2017-09-08 RX ORDER — CALCIUM CARBONATE/VITAMIN D3 500-10/5ML
400 LIQUID (ML) ORAL 2 TIMES DAILY
Qty: 60 CAPSULE | Refills: 1 | Status: SHIPPED | OUTPATIENT
Start: 2017-09-08 | End: 2017-10-06 | Stop reason: SDUPTHER

## 2017-09-08 RX ORDER — AMITRIPTYLINE HYDROCHLORIDE 25 MG/1
TABLET, FILM COATED ORAL
Qty: 30 TABLET | Refills: 1 | Status: SHIPPED | OUTPATIENT
Start: 2017-09-08 | End: 2017-10-06 | Stop reason: SDUPTHER

## 2017-09-08 RX ORDER — GABAPENTIN 600 MG/1
600 TABLET ORAL 3 TIMES DAILY
Qty: 90 TABLET | Refills: 1 | Status: SHIPPED | OUTPATIENT
Start: 2017-09-08 | End: 2017-10-06 | Stop reason: SDUPTHER

## 2017-09-08 RX ORDER — OXYCODONE AND ACETAMINOPHEN 7.5; 325 MG/1; MG/1
1 TABLET ORAL EVERY 6 HOURS PRN
Qty: 112 TABLET | Refills: 0 | Status: SHIPPED | OUTPATIENT
Start: 2017-09-08 | End: 2017-10-06 | Stop reason: SDUPTHER

## 2017-10-05 ENCOUNTER — TELEPHONE (OUTPATIENT)
Dept: PULMONOLOGY | Age: 72
End: 2017-10-05

## 2017-10-06 ENCOUNTER — OFFICE VISIT (OUTPATIENT)
Dept: PAIN MANAGEMENT | Age: 72
End: 2017-10-06

## 2017-10-06 VITALS
BODY MASS INDEX: 25.61 KG/M2 | SYSTOLIC BLOOD PRESSURE: 130 MMHG | HEART RATE: 77 BPM | DIASTOLIC BLOOD PRESSURE: 66 MMHG | WEIGHT: 150 LBS | HEIGHT: 64 IN

## 2017-10-06 DIAGNOSIS — M51.36 DDD (DEGENERATIVE DISC DISEASE), LUMBAR: ICD-10-CM

## 2017-10-06 DIAGNOSIS — G89.4 CHRONIC PAIN SYNDROME: ICD-10-CM

## 2017-10-06 DIAGNOSIS — M79.7 FIBROMYALGIA: ICD-10-CM

## 2017-10-06 DIAGNOSIS — M47.816 OSTEOARTHRITIS OF LUMBAR SPINE, UNSPECIFIED SPINAL OSTEOARTHRITIS COMPLICATION STATUS: ICD-10-CM

## 2017-10-06 DIAGNOSIS — M47.26 OSTEOARTHRITIS OF SPINE WITH RADICULOPATHY, LUMBAR REGION: ICD-10-CM

## 2017-10-06 PROCEDURE — 99213 OFFICE O/P EST LOW 20 MIN: CPT | Performed by: INTERNAL MEDICINE

## 2017-10-06 RX ORDER — TIZANIDINE 4 MG/1
TABLET ORAL
Qty: 30 TABLET | Refills: 0 | Status: SHIPPED | OUTPATIENT
Start: 2017-10-06 | End: 2017-11-03 | Stop reason: SDUPTHER

## 2017-10-06 RX ORDER — CALCIUM CARBONATE/VITAMIN D3 500-10/5ML
400 LIQUID (ML) ORAL 2 TIMES DAILY
Qty: 60 CAPSULE | Refills: 0 | Status: SHIPPED | OUTPATIENT
Start: 2017-10-06 | End: 2017-11-03 | Stop reason: SDUPTHER

## 2017-10-06 RX ORDER — AMITRIPTYLINE HYDROCHLORIDE 25 MG/1
TABLET, FILM COATED ORAL
Qty: 30 TABLET | Refills: 0 | Status: SHIPPED | OUTPATIENT
Start: 2017-10-06 | End: 2017-11-03 | Stop reason: SDUPTHER

## 2017-10-06 RX ORDER — MELOXICAM 15 MG/1
TABLET ORAL
Qty: 30 TABLET | Refills: 0 | Status: SHIPPED | OUTPATIENT
Start: 2017-10-06 | End: 2017-11-03 | Stop reason: SDUPTHER

## 2017-10-06 RX ORDER — OXYCODONE AND ACETAMINOPHEN 7.5; 325 MG/1; MG/1
1 TABLET ORAL EVERY 6 HOURS PRN
Qty: 112 TABLET | Refills: 0 | Status: SHIPPED | OUTPATIENT
Start: 2017-10-06 | End: 2017-11-03 | Stop reason: SDUPTHER

## 2017-10-06 RX ORDER — GABAPENTIN 600 MG/1
600 TABLET ORAL 3 TIMES DAILY
Qty: 90 TABLET | Refills: 0 | Status: SHIPPED | OUTPATIENT
Start: 2017-10-06 | End: 2017-11-03 | Stop reason: SDUPTHER

## 2017-10-06 NOTE — PROGRESS NOTES
distention. PHYSICAL EXAM:   Nursing note and vitals reviewed. /66 (Site: Right Arm, Position: Sitting)  Pulse 77  Ht 5' 4\" (1.626 m)  Wt 150 lb (68 kg)  Breastfeeding? No  BMI 25.75 kg/m2  Constitutional: She appears well-developed and well-nourished. No acute distress. Skin: Skin is warm and dry, good turgor. No rash noted. She is not diaphoretic. Cardiovascular: Normal rate, regular rhythm, normal heart sounds, and does not have murmur. Pulmonary/Chest: Effort normal. No respiratory distress. She does not have wheezes in the lung fields. She has no rales. Decrease air exchange bilaterally   Neurological/Psychiatric:She is alert and oriented to person, place, and time. Coordination is  normal. Her mood isAppropriate and affect is Flat/blunted and Anxious . Other: + tenderness sacral bone     IMPRESSION:   1. Chronic pain syndrome    2. Fibromyalgia    3. DDD (degenerative disc disease), lumbar    4. Osteoarthritis of lumbar spine, unspecified spinal osteoarthritis complication status    5. Osteoarthritis of spine with radiculopathy, lumbar region        PLAN:  Informed verbal consent was obtained  -Continue with current regimen   -Continue with Percocet 3-4 per day   -she was advised proper sleep hygiene-told to avoid:use of caffeine or other stimulants after noon, alcohol use near bedtime, long or frequent naps during the day, erratic sleep schedule, heavy meals near bedtime, vigorous exercise near bedtime and use of electronic devices near bedtime   -Continue with Elavil   -Adv Biofeedback, relaxation and meditation techniques.  Referral to psychologist for CBT was also discussed with patient   -Try Pennsiad gel to the back   Current Outpatient Prescriptions   Medication Sig Dispense Refill    pantoprazole (PROTONIX) 20 MG tablet TAKE 1 TABLET BY MOUTH DAILY 30 tablet 5    atorvastatin (LIPITOR) 80 MG tablet TAKE 1 TABLET BY MOUTH EVERY DAY FOR CHOLESTEROL 30 tablet 5    oxyCODONE-acetaminophen (PERCOCET) 7.5-325 MG per tablet Take 1 tablet by mouth every 6 hours as needed for Pain  Max 4 a day. 112 tablet 0    Magnesium Oxide 400 MG CAPS Take 400 mg by mouth 2 times daily 60 capsule 1    amitriptyline (ELAVIL) 25 MG tablet TAKE 1 TABLET BY MOUTH NIGHTLY 30 tablet 1    gabapentin (NEURONTIN) 600 MG tablet Take 1 tablet by mouth 3 times daily 90 tablet 1    meloxicam (MOBIC) 15 MG tablet TAKE 1 TABLET BY MOUTH DAILY 30 tablet 1    tiZANidine (ZANAFLEX) 4 MG tablet Take one tablet po at 9 pm PRN 30 tablet 1    Fluticasone Furoate-Vilanterol (BREO ELLIPTA) 200-25 MCG/INH AEPB INHALE 1 PUFF INTO THE LUNGS DAILY 28 each 1    vitamin D (ERGOCALCIFEROL) 44879 units CAPS capsule TAKE 1 CAPSULE BY MOUTH TWICE WEEKLY 24 capsule 1    calcium carbonate 600 MG TABS tablet Take 1 tablet by mouth 2 times daily      albuterol (PROVENTIL HFA;VENTOLIN HFA) 108 (90 BASE) MCG/ACT inhaler Inhale 2 puffs into the lungs every 4 hours as needed. 1 Inhaler 5     No current facility-administered medications for this visit. I will continue her current medication regimen  which is part of the above treatment schedule. It has been helping with Ms. Deena Cooper chronic  medical problems which for this visit include:   Diagnoses of Chronic pain syndrome, Fibromyalgia, DDD (degenerative disc disease), lumbar, Osteoarthritis of lumbar spine, unspecified spinal osteoarthritis complication status, and Osteoarthritis of spine with radiculopathy, lumbar region were pertinent to this visit. Risks and benefits of the medications and other alternative treatments  including no treatment were discussed with the patient. The common side effects of these medications were also explained to the patient. Informed verbal consent was obtained.    Goals of current treatment regimen include improvement in pain, restoration of functioning- with focus on improvement in physical performance, general activity, work or disability,emotional distress, health care utilization and  decreased medication consumption. Will continue to monitor progress towards achieving/maintaining therapeutic goals with special emphasis on  1. Improvement in perceived interfernce  of pain with ADL's. Ability to do home exercises independently. Ability to do household chores indoor and/or outdoor work and social and leisure activities. Improve psychosocial and physical functioning. - she is showing progression towards this treatment goal with the current regimen. She was advised against drinking alcohol with the narcotic pain medicines, advised against driving or handling machinery while adjusting the dose of medicines or if having cognitive  issues related to the current medications. Risk of overdose and death, if medicines not taken as prescribed, were also discussed. If the patient develops new symptoms or if the symptoms worsen, the patient should call the office. While transcribing every attempt was made to maintain the accuracy of the note in terms of it's contents,there may have been some errors made inadvertently. Thank you for allowing me to participate in the care of this patient. Brooklyn López MD.    Cc: Roe Villalobos CNP   I, Thelma Mitchell am scribing for and in the presence of Dr. Brooklyn López.    10/06/17  9:56 AM  BRITTANEY Burris, Dr. Brooklyn López, personally performed the services described in this documentation as scribed by   Thelma Mitchell MA in my presence and it is both accurate and complete

## 2017-10-23 ENCOUNTER — TELEPHONE (OUTPATIENT)
Dept: PULMONOLOGY | Age: 72
End: 2017-10-23

## 2017-10-25 ENCOUNTER — HOSPITAL ENCOUNTER (OUTPATIENT)
Dept: PULMONOLOGY | Age: 72
Discharge: HOME | End: 2017-10-25
Attending: NURSE PRACTITIONER | Admitting: NURSE PRACTITIONER

## 2017-10-25 DIAGNOSIS — J44.9 CHRONIC OBSTRUCTIVE PULMONARY DISEASE (HCC): ICD-10-CM

## 2017-10-25 DIAGNOSIS — J44.9 CHRONIC OBSTRUCTIVE PULMONARY DISEASE, UNSPECIFIED COPD TYPE (HCC): ICD-10-CM

## 2017-10-25 PROCEDURE — 94060 EVALUATION OF WHEEZING: CPT | Performed by: INTERNAL MEDICINE

## 2017-10-25 PROCEDURE — 94729 DIFFUSING CAPACITY: CPT | Performed by: INTERNAL MEDICINE

## 2017-10-25 PROCEDURE — 94727 GAS DIL/WSHOT DETER LNG VOL: CPT | Performed by: INTERNAL MEDICINE

## 2017-10-25 RX ORDER — ALBUTEROL SULFATE 90 UG/1
4 AEROSOL, METERED RESPIRATORY (INHALATION) ONCE
Status: COMPLETED | OUTPATIENT
Start: 2017-10-25 | End: 2017-10-25

## 2017-10-25 RX ADMIN — ALBUTEROL SULFATE 4 PUFF: 90 AEROSOL, METERED RESPIRATORY (INHALATION) at 10:15

## 2017-10-25 NOTE — PROCEDURES
Spirometry was acceptable and reproducible by ATS standards      1. Flows: Flow measurements demonstrate the presence of an obstructive pulmonary defect. The obstruction is moderately severe as defined by an FEV1 between 50-59% predicted. Following the administration of bronchodilator a significant improvement in flow measurements was not seen. 2. Flow volume loop is:  Consistent with an obstructive defect. 3. Lung volumes: Total lung capacity is normal. Vital capacity is normal. RV/TLC is normal.    4. Diffusing capacity:  Diffusion is moderately reduced. When averaged over lung volumes it normalizes. Summary: Moderately severe lower airflow obstruction without significant reversal. Lung volumes are normal. Diffusing capacity is moderately reduced. Findings support a diagnosis of COPD. OBSTRUCTION % Predicted FEV1   MILD >70%   MODERATE 60-69%   MODERATELY-SEVERE 50-59%   SEVERE 35-49%   VERY SEVERE <35%         RESTRICTION % Predicted TLC   MILD 66-80%   MODERATE 54-65%   MODERATELY-SEVERE <54%                 DIFFUSION CAPACITY DLCO % Pred   MILD >60% AND < LLN   MODERATE 40-60%   SEVERE <40%       PFT data will be scanned into the media tab under this encounter. Please see the scanned data for numerical values.      Samuel Sainz MD  [facility] Pulmonology, Critical Care and Sleep

## 2017-10-26 ENCOUNTER — OFFICE VISIT (OUTPATIENT)
Dept: PULMONOLOGY | Age: 72
End: 2017-10-26

## 2017-10-26 VITALS
RESPIRATION RATE: 16 BRPM | SYSTOLIC BLOOD PRESSURE: 122 MMHG | HEART RATE: 86 BPM | TEMPERATURE: 98.1 F | BODY MASS INDEX: 26.46 KG/M2 | HEIGHT: 64 IN | DIASTOLIC BLOOD PRESSURE: 62 MMHG | WEIGHT: 155 LBS | OXYGEN SATURATION: 94 %

## 2017-10-26 DIAGNOSIS — F17.210 CIGARETTE NICOTINE DEPENDENCE WITHOUT COMPLICATION: ICD-10-CM

## 2017-10-26 DIAGNOSIS — J44.9 COPD, SEVERE (HCC): Primary | ICD-10-CM

## 2017-10-26 PROCEDURE — 3023F SPIROM DOC REV: CPT | Performed by: INTERNAL MEDICINE

## 2017-10-26 PROCEDURE — G8427 DOCREV CUR MEDS BY ELIG CLIN: HCPCS | Performed by: INTERNAL MEDICINE

## 2017-10-26 PROCEDURE — G8926 SPIRO NO PERF OR DOC: HCPCS | Performed by: INTERNAL MEDICINE

## 2017-10-26 PROCEDURE — G8399 PT W/DXA RESULTS DOCUMENT: HCPCS | Performed by: INTERNAL MEDICINE

## 2017-10-26 PROCEDURE — 3017F COLORECTAL CA SCREEN DOC REV: CPT | Performed by: INTERNAL MEDICINE

## 2017-10-26 PROCEDURE — G8417 CALC BMI ABV UP PARAM F/U: HCPCS | Performed by: INTERNAL MEDICINE

## 2017-10-26 PROCEDURE — 3014F SCREEN MAMMO DOC REV: CPT | Performed by: INTERNAL MEDICINE

## 2017-10-26 PROCEDURE — 1090F PRES/ABSN URINE INCON ASSESS: CPT | Performed by: INTERNAL MEDICINE

## 2017-10-26 PROCEDURE — G8484 FLU IMMUNIZE NO ADMIN: HCPCS | Performed by: INTERNAL MEDICINE

## 2017-10-26 PROCEDURE — 1123F ACP DISCUSS/DSCN MKR DOCD: CPT | Performed by: INTERNAL MEDICINE

## 2017-10-26 PROCEDURE — 4040F PNEUMOC VAC/ADMIN/RCVD: CPT | Performed by: INTERNAL MEDICINE

## 2017-10-26 PROCEDURE — 99213 OFFICE O/P EST LOW 20 MIN: CPT | Performed by: INTERNAL MEDICINE

## 2017-10-26 PROCEDURE — 4004F PT TOBACCO SCREEN RCVD TLK: CPT | Performed by: INTERNAL MEDICINE

## 2017-10-26 NOTE — Clinical Note
Doing ok. Gave Breo samples to help with cost.  Smoking again secondary to life stress. See full note attached.   Thanks, Breanne Loredo

## 2017-10-26 NOTE — PROGRESS NOTES
Reviewed:   CBC and Renal reviewed  Last CBC  Lab Results   Component Value Date    WBC 5.9 02/15/2017    RBC 4.07 02/15/2017    HGB 12.8 02/15/2017    MCV 95.4 02/15/2017     02/15/2017     Last Renal  Lab Results   Component Value Date     09/07/2017    K 4.7 09/07/2017     09/07/2017    CO2 25 09/07/2017    CO2 27 02/15/2017    CO2 28 01/13/2017    BUN 16 09/07/2017    CREATININE 0.8 09/07/2017    GLUCOSE 106 09/07/2017    CALCIUM 8.9 09/07/2017       Last ABG  POC Blood Gas: No results found for: POCPH, POCPCO2, POCPO2, POCHCO3, NBEA, HOQD7GQL  No results for input(s): PH, PCO2, PO2, HCO3, BE, O2SAT in the last 72 hours. Assessment:     COPD  Tobacco abuse. Lung cancer screening 2.15.17    Plan:      1. COPD, severe ([address]  She has responded to [facility] but still having some symptoms. Since in the [facility], I will not add therapy at this time. Will consider [facility] next year if on plan     2. Nicotine dependence, cigarettes,   She has started smoking again with significant increase in life stress. Because of this, will wait to try to quit in the future. Needs yearly lung cancer screening in 2018. The patient expressed understanding for all.

## 2017-11-03 ENCOUNTER — OFFICE VISIT (OUTPATIENT)
Dept: PAIN MANAGEMENT | Age: 72
End: 2017-11-03

## 2017-11-03 VITALS
BODY MASS INDEX: 26.61 KG/M2 | DIASTOLIC BLOOD PRESSURE: 77 MMHG | SYSTOLIC BLOOD PRESSURE: 126 MMHG | WEIGHT: 155 LBS | HEART RATE: 96 BPM

## 2017-11-03 DIAGNOSIS — M51.36 DDD (DEGENERATIVE DISC DISEASE), LUMBAR: ICD-10-CM

## 2017-11-03 DIAGNOSIS — M79.7 FIBROMYALGIA: ICD-10-CM

## 2017-11-03 DIAGNOSIS — G89.4 CHRONIC PAIN SYNDROME: ICD-10-CM

## 2017-11-03 DIAGNOSIS — M47.26 OSTEOARTHRITIS OF SPINE WITH RADICULOPATHY, LUMBAR REGION: ICD-10-CM

## 2017-11-03 DIAGNOSIS — M47.816 OSTEOARTHRITIS OF LUMBAR SPINE, UNSPECIFIED SPINAL OSTEOARTHRITIS COMPLICATION STATUS: ICD-10-CM

## 2017-11-03 PROCEDURE — 99213 OFFICE O/P EST LOW 20 MIN: CPT | Performed by: INTERNAL MEDICINE

## 2017-11-03 PROCEDURE — 1123F ACP DISCUSS/DSCN MKR DOCD: CPT | Performed by: INTERNAL MEDICINE

## 2017-11-03 PROCEDURE — 4040F PNEUMOC VAC/ADMIN/RCVD: CPT | Performed by: INTERNAL MEDICINE

## 2017-11-03 PROCEDURE — G8399 PT W/DXA RESULTS DOCUMENT: HCPCS | Performed by: INTERNAL MEDICINE

## 2017-11-03 PROCEDURE — 3017F COLORECTAL CA SCREEN DOC REV: CPT | Performed by: INTERNAL MEDICINE

## 2017-11-03 PROCEDURE — G8484 FLU IMMUNIZE NO ADMIN: HCPCS | Performed by: INTERNAL MEDICINE

## 2017-11-03 PROCEDURE — 4004F PT TOBACCO SCREEN RCVD TLK: CPT | Performed by: INTERNAL MEDICINE

## 2017-11-03 PROCEDURE — 1090F PRES/ABSN URINE INCON ASSESS: CPT | Performed by: INTERNAL MEDICINE

## 2017-11-03 PROCEDURE — G8427 DOCREV CUR MEDS BY ELIG CLIN: HCPCS | Performed by: INTERNAL MEDICINE

## 2017-11-03 PROCEDURE — 3014F SCREEN MAMMO DOC REV: CPT | Performed by: INTERNAL MEDICINE

## 2017-11-03 PROCEDURE — G8417 CALC BMI ABV UP PARAM F/U: HCPCS | Performed by: INTERNAL MEDICINE

## 2017-11-03 RX ORDER — MELOXICAM 15 MG/1
TABLET ORAL
Qty: 30 TABLET | Refills: 0 | Status: SHIPPED | OUTPATIENT
Start: 2017-11-03 | End: 2017-12-01 | Stop reason: SDUPTHER

## 2017-11-03 RX ORDER — OXYCODONE AND ACETAMINOPHEN 7.5; 325 MG/1; MG/1
1 TABLET ORAL EVERY 6 HOURS PRN
Qty: 112 TABLET | Refills: 0 | Status: SHIPPED | OUTPATIENT
Start: 2017-11-03 | End: 2017-12-01 | Stop reason: SDUPTHER

## 2017-11-03 RX ORDER — CALCIUM CARBONATE/VITAMIN D3 500-10/5ML
LIQUID (ML) ORAL
Qty: 60 CAPSULE | Refills: 0 | Status: SHIPPED | OUTPATIENT
Start: 2017-11-03 | End: 2017-12-28 | Stop reason: SDUPTHER

## 2017-11-03 RX ORDER — GABAPENTIN 600 MG/1
600 TABLET ORAL 3 TIMES DAILY
Qty: 90 TABLET | Refills: 0 | Status: SHIPPED | OUTPATIENT
Start: 2017-11-03 | End: 2017-12-01 | Stop reason: SDUPTHER

## 2017-11-03 RX ORDER — TIZANIDINE 4 MG/1
TABLET ORAL
Qty: 30 TABLET | Refills: 0 | Status: SHIPPED | OUTPATIENT
Start: 2017-11-03 | End: 2017-12-01 | Stop reason: SDUPTHER

## 2017-11-03 RX ORDER — AMITRIPTYLINE HYDROCHLORIDE 25 MG/1
TABLET, FILM COATED ORAL
Qty: 30 TABLET | Refills: 0 | Status: SHIPPED | OUTPATIENT
Start: 2017-11-03 | End: 2017-12-01 | Stop reason: SDUPTHER

## 2017-11-03 NOTE — PROGRESS NOTES
Gold Haines  1945  [id number]    HISTORY OF PRESENT ILLNESS:  Ms. Nia Woods is a 81 y.o. female returns for a follow up visit for multiple medical problems. Her current presenting problems are   1. Chronic pain syndrome    2. Fibromyalgia    3. DDD (degenerative disc disease), lumbar    4. Osteoarthritis of lumbar spine, unspecified spinal osteoarthritis complication status    5. Osteoarthritis of spine with radiculopathy, lumbar region    . As per information/history obtained from the PADT(patient assessment and documentation tool) - She complains of pain in the lower back with radiation to the buttocks She rates the pain 4/10 and describes it as sharp, aching. Pain is made worse by: movement. Current treatment regimen has helped relieve about 60% of the pain. She denies side effects from the current pain regimen. Patient reports that since the last follow up visit the physical functioning is unchanged, family/social relationships are unchanged, mood is unchanged and sleep patterns are unchanged, and that the overall functioning is unchanged. Patient denies neurological bowel or bladder. Patient denies misusing/abusing her narcotic pain medications or using any illegal drugs. There are No indicators for possible drug abuse, addiction or diversion problems. Upon obtaining the medical history from Ms. Nia Woods regarding today's office visit for her presenting problems, Patient states her pain has been baseline tolerable. She states her tail bone has been getting better, can sit with pain. Patient states her sleep is fair. Has fairly normal sleep latency. Averages about 4-6 hours of sleep a night. Denies any signs of sleep apnea. Feels somewhat rested in the morning. Ms. Nia Woods mentions using Elavil, which has been helping with sleep. She denies any GERD symptoms. ALLERGIES: Patients list of allergies were reviewed     MEDICATIONS: Ms. Nia Woods list of medications were reviewed. Her current medications are Outpatient Medications Prior to Visit   Medication Sig Dispense Refill    pantoprazole (PROTONIX) 20 MG tablet TAKE 1 TABLET BY MOUTH DAILY 30 tablet 5    atorvastatin (LIPITOR) 80 MG tablet TAKE 1 TABLET BY MOUTH EVERY DAY FOR CHOLESTEROL 30 tablet 5    oxyCODONE-acetaminophen (PERCOCET) 7.5-325 MG per tablet Take 1 tablet by mouth every 6 hours as needed for Pain  Max 4 a day. 112 tablet 0    Magnesium Oxide 400 MG CAPS Take 400 mg by mouth 2 times daily 60 capsule 0    amitriptyline (ELAVIL) 25 MG tablet TAKE 1 TABLET BY MOUTH NIGHTLY 30 tablet 0    gabapentin (NEURONTIN) 600 MG tablet Take 1 tablet by mouth 3 times daily 90 tablet 0    meloxicam (MOBIC) 15 MG tablet TAKE 1 TABLET BY MOUTH DAILY 30 tablet 0    tiZANidine (ZANAFLEX) 4 MG tablet Take one tablet po at 9 pm PRN 30 tablet 0    Fluticasone Furoate-Vilanterol (BREO ELLIPTA) 200-25 MCG/INH AEPB INHALE 1 PUFF INTO THE LUNGS DAILY 28 each 1    vitamin D (ERGOCALCIFEROL) 27548 units CAPS capsule TAKE 1 CAPSULE BY MOUTH TWICE WEEKLY 24 capsule 1    calcium carbonate 600 MG TABS tablet Take 1 tablet by mouth 2 times daily      albuterol (PROVENTIL HFA;VENTOLIN HFA) 108 (90 BASE) MCG/ACT inhaler Inhale 2 puffs into the lungs every 4 hours as needed. 1 Inhaler 5     No facility-administered medications prior to visit. SOCIAL/FAMILY/PAST MEDICAL HISTORY: Ms. Morris Epps, family and past medical history was reviewed. REVIEW OF SYSTEMS:    Respiratory: Negative for apnea, chest tightness and shortness of breath or change in baseline breathing. Gastrointestinal: Negative for nausea, vomiting, abdominal pain, diarrhea, constipation, blood in stool and abdominal distention. PHYSICAL EXAM:   Nursing note and vitals reviewed. /77 (Site: Left Arm, Position: Sitting)   Pulse 96   Wt 155 lb (70.3 kg)   Breastfeeding? No   BMI 26.61 kg/m²   Constitutional: She appears well-developed and well-nourished. No acute distress. hours as needed for Pain  Max 4 a day. 112 tablet 0    Magnesium Oxide 400 MG CAPS Take 400 mg by mouth 2 times daily 60 capsule 0    amitriptyline (ELAVIL) 25 MG tablet TAKE 1 TABLET BY MOUTH NIGHTLY 30 tablet 0    gabapentin (NEURONTIN) 600 MG tablet Take 1 tablet by mouth 3 times daily 90 tablet 0    meloxicam (MOBIC) 15 MG tablet TAKE 1 TABLET BY MOUTH DAILY 30 tablet 0    tiZANidine (ZANAFLEX) 4 MG tablet Take one tablet po at 9 pm PRN 30 tablet 0    Fluticasone Furoate-Vilanterol (BREO ELLIPTA) 200-25 MCG/INH AEPB INHALE 1 PUFF INTO THE LUNGS DAILY 28 each 1    vitamin D (ERGOCALCIFEROL) 34401 units CAPS capsule TAKE 1 CAPSULE BY MOUTH TWICE WEEKLY 24 capsule 1    calcium carbonate 600 MG TABS tablet Take 1 tablet by mouth 2 times daily      albuterol (PROVENTIL HFA;VENTOLIN HFA) 108 (90 BASE) MCG/ACT inhaler Inhale 2 puffs into the lungs every 4 hours as needed. 1 Inhaler 5     No current facility-administered medications for this visit. I will continue her current medication regimen  which is part of the above treatment schedule. It has been helping with Ms. Rowan Lewis chronic  medical problems which for this visit include:   Diagnoses of Chronic pain syndrome, Fibromyalgia, DDD (degenerative disc disease), lumbar, Osteoarthritis of lumbar spine, unspecified spinal osteoarthritis complication status, and Osteoarthritis of spine with radiculopathy, lumbar region were pertinent to this visit. Risks and benefits of the medications and other alternative treatments  including no treatment were discussed with the patient. The common side effects of these medications were also explained to the patient. Informed verbal consent was obtained. Goals of current treatment regimen include improvement in pain, restoration of functioning- with focus on improvement in physical performance, general activity, work or disability,emotional distress, health care utilization and  decreased medication consumption. Will continue to monitor progress towards achieving/maintaining therapeutic goals with special emphasis on  1. Improvement in perceived interfernce  of pain with ADL's. Ability to do home exercises independently. Ability to do household chores indoor and/or outdoor work and social and leisure activities. Improve psychosocial and physical functioning. - she is showing progression towards this treatment goal with the current regimen. She was advised against drinking alcohol with the narcotic pain medicines, advised against driving or handling machinery while adjusting the dose of medicines or if having cognitive  issues related to the current medications. Risk of overdose and death, if medicines not taken as prescribed, were also discussed. If the patient develops new symptoms or if the symptoms worsen, the patient should call the office. While transcribing every attempt was made to maintain the accuracy of the note in terms of it's contents,there may have been some errors made inadvertently. Thank you for allowing me to participate in the care of this patient. Nereyda Cooper MD.    Cc: Madi Valera CNP    I, Rio Aragon am scribing for and in the presence of Dr. Nereyda Cooper.    11/03/17  9:56 AM  Mikeal Gowers, MA I, Dr. Nereyda Cooper, personally performed the services described in this documentation as scribed by   Rio Aragon MA in my presence and it is both accurate and complete

## 2017-11-10 ENCOUNTER — HOSPITAL ENCOUNTER (OUTPATIENT)
Dept: WOMENS IMAGING | Age: 72
Discharge: HOME | End: 2017-11-10

## 2017-11-10 DIAGNOSIS — M85.89 OSTEOPENIA OF MULTIPLE SITES: ICD-10-CM

## 2017-11-10 DIAGNOSIS — Z12.31 VISIT FOR SCREENING MAMMOGRAM: ICD-10-CM

## 2017-11-16 ENCOUNTER — TELEPHONE (OUTPATIENT)
Dept: FAMILY MEDICINE CLINIC | Age: 72
End: 2017-11-16

## 2017-11-16 NOTE — TELEPHONE ENCOUNTER
Spoke with Flako Elizondo. She is going to fax over paperwork for me to sign stating that patient is able to care for foster children. Planning on having granddaughter who is 13 yo and great grandchild who is 10 months old living with her. Please let me review the paperwork when received.

## 2017-11-16 NOTE — TELEPHONE ENCOUNTER
Benjamin Valladares From [facility] is calling to Speak to [place]. I transferred the call. Please advise. Thanks.

## 2017-12-01 ENCOUNTER — OFFICE VISIT (OUTPATIENT)
Dept: PAIN MANAGEMENT | Age: 72
End: 2017-12-01

## 2017-12-01 VITALS
WEIGHT: 155 LBS | DIASTOLIC BLOOD PRESSURE: 69 MMHG | SYSTOLIC BLOOD PRESSURE: 125 MMHG | BODY MASS INDEX: 26.61 KG/M2 | HEART RATE: 91 BPM

## 2017-12-01 DIAGNOSIS — F51.01 PRIMARY INSOMNIA: ICD-10-CM

## 2017-12-01 DIAGNOSIS — F33.1 MODERATE EPISODE OF RECURRENT MAJOR DEPRESSIVE DISORDER (HCC): ICD-10-CM

## 2017-12-01 DIAGNOSIS — M47.26 OSTEOARTHRITIS OF SPINE WITH RADICULOPATHY, LUMBAR REGION: ICD-10-CM

## 2017-12-01 DIAGNOSIS — G89.4 CHRONIC PAIN SYNDROME: ICD-10-CM

## 2017-12-01 DIAGNOSIS — M47.816 LUMBAR SPONDYLOSIS: ICD-10-CM

## 2017-12-01 DIAGNOSIS — K59.03 DRUG-INDUCED CONSTIPATION: ICD-10-CM

## 2017-12-01 DIAGNOSIS — M79.7 FIBROMYALGIA: ICD-10-CM

## 2017-12-01 DIAGNOSIS — M47.816 OSTEOARTHRITIS OF LUMBAR SPINE, UNSPECIFIED SPINAL OSTEOARTHRITIS COMPLICATION STATUS: ICD-10-CM

## 2017-12-01 DIAGNOSIS — M51.36 DDD (DEGENERATIVE DISC DISEASE), LUMBAR: ICD-10-CM

## 2017-12-01 PROCEDURE — G8427 DOCREV CUR MEDS BY ELIG CLIN: HCPCS | Performed by: INTERNAL MEDICINE

## 2017-12-01 PROCEDURE — 4040F PNEUMOC VAC/ADMIN/RCVD: CPT | Performed by: INTERNAL MEDICINE

## 2017-12-01 PROCEDURE — G8399 PT W/DXA RESULTS DOCUMENT: HCPCS | Performed by: INTERNAL MEDICINE

## 2017-12-01 PROCEDURE — G8417 CALC BMI ABV UP PARAM F/U: HCPCS | Performed by: INTERNAL MEDICINE

## 2017-12-01 PROCEDURE — 3014F SCREEN MAMMO DOC REV: CPT | Performed by: INTERNAL MEDICINE

## 2017-12-01 PROCEDURE — 99214 OFFICE O/P EST MOD 30 MIN: CPT | Performed by: INTERNAL MEDICINE

## 2017-12-01 PROCEDURE — G8484 FLU IMMUNIZE NO ADMIN: HCPCS | Performed by: INTERNAL MEDICINE

## 2017-12-01 PROCEDURE — 1123F ACP DISCUSS/DSCN MKR DOCD: CPT | Performed by: INTERNAL MEDICINE

## 2017-12-01 PROCEDURE — 4004F PT TOBACCO SCREEN RCVD TLK: CPT | Performed by: INTERNAL MEDICINE

## 2017-12-01 PROCEDURE — 3017F COLORECTAL CA SCREEN DOC REV: CPT | Performed by: INTERNAL MEDICINE

## 2017-12-01 PROCEDURE — 1090F PRES/ABSN URINE INCON ASSESS: CPT | Performed by: INTERNAL MEDICINE

## 2017-12-01 RX ORDER — MELOXICAM 15 MG/1
TABLET ORAL
Qty: 30 TABLET | Refills: 0 | Status: SHIPPED | OUTPATIENT
Start: 2017-12-01 | End: 2017-12-28 | Stop reason: SDUPTHER

## 2017-12-01 RX ORDER — AMITRIPTYLINE HYDROCHLORIDE 25 MG/1
TABLET, FILM COATED ORAL
Qty: 30 TABLET | Refills: 0 | Status: SHIPPED | OUTPATIENT
Start: 2017-12-01 | End: 2017-12-28 | Stop reason: SDUPTHER

## 2017-12-01 RX ORDER — DULOXETIN HYDROCHLORIDE 60 MG/1
CAPSULE, DELAYED RELEASE ORAL
Qty: 30 CAPSULE | Refills: 0 | Status: SHIPPED | OUTPATIENT
Start: 2017-12-01 | End: 2017-12-28 | Stop reason: SDUPTHER

## 2017-12-01 RX ORDER — GABAPENTIN 600 MG/1
600 TABLET ORAL 3 TIMES DAILY
Qty: 90 TABLET | Refills: 0 | Status: SHIPPED | OUTPATIENT
Start: 2017-12-01 | End: 2017-12-28 | Stop reason: SDUPTHER

## 2017-12-01 RX ORDER — DULOXETIN HYDROCHLORIDE 30 MG/1
CAPSULE, DELAYED RELEASE ORAL
Qty: 30 CAPSULE | Refills: 0 | Status: SHIPPED | OUTPATIENT
Start: 2017-12-01 | End: 2017-12-28 | Stop reason: SDUPTHER

## 2017-12-01 RX ORDER — OXYCODONE AND ACETAMINOPHEN 7.5; 325 MG/1; MG/1
1 TABLET ORAL EVERY 6 HOURS PRN
Qty: 112 TABLET | Refills: 0 | Status: SHIPPED | OUTPATIENT
Start: 2017-12-01 | End: 2017-12-28 | Stop reason: SDUPTHER

## 2017-12-01 RX ORDER — TIZANIDINE 4 MG/1
TABLET ORAL
Qty: 30 TABLET | Refills: 0 | Status: SHIPPED | OUTPATIENT
Start: 2017-12-01 | End: 2017-12-28 | Stop reason: SDUPTHER

## 2017-12-01 NOTE — PROGRESS NOTES
Matteo Lewis  1945  [id number]    HISTORY OF PRESENT ILLNESS:  Ms. Jody Barry is a 81 y.o. female returns for a follow up visit for multiple medical problems. Her current presenting problems are   1. Fibromyalgia    2. Chronic pain syndrome    3. Moderate episode of recurrent major depressive disorder ([address]    4. DDD (degenerative disc disease), lumbar    5. Osteoarthritis of spine with radiculopathy, lumbar region    6. Lumbar spondylosis    7. Primary insomnia    8. Drug-induced constipation    . As per information/history obtained from the PADT(patient assessment and documentation tool) - She complains of pain in the upper back and lower back with radiation to the shoulders Bilateral, arms Bilateral, elbows Bilateral, buttocks, lower leg Bilateral, ankles Bilateral and feet Bilateral She rates the pain 6/10 and describes it as sharp, aching. Pain is made worse by: movement. Current treatment regimen has helped relieve about 40% of the pain. She denies side effects from the current pain regimen. Patient reports that since the last follow up visit the physical functioning is worse, family/social relationships are unchanged, mood is unchanged and sleep patterns are unchanged, and that the overall functioning is worse. Patient denies neurological bowel or bladder. Patient denies misusing/abusing her narcotic pain medications or using any illegal drugs. There are No indicators for possible drug abuse, addiction or diversion problems. Upon obtaining the medical history from Ms. Jody Barry regarding today's office visit for her presenting problems, Patient states she has been having increased pain all over, legs and arms feel like they are carrying weights around. She says this has been going on for 3 weeks. Ms. Jody Barry mentions she has been complaint with the medications. Patient's  subjective report of her mood is fair.  she describes occasional symptoms of depression, occasional  irritability and some mood swings. Describes her mood as being neutral and reports some pleasure in her daily activities. Reports  fair  appetite, energy and concentration. Able to function well in different aspects of her daily activities. Denies suicidal or homicidal ideation. Denies any complaints of increased tension, does   Worry sometimes and occasional  irritability  she denies any c/o increased anxiety, No c/o panic attacks or symptoms of PTSD. Patient states her sleep is fair. Has fairly normal sleep latency. Averages about 4-6 hours of sleep a night. Denies any signs of sleep apnea. Feels somewhat rested in the morning. Patient reports she was using Cymbalta, ran out of it. ALLERGIES/PAST MED/FAM/SOC HISTORY: Ms. James Harris allergies, past medical, family and social history were reviewed in the chart and pertinent information also listed below. Social History     Social History    Marital status: Single     Spouse name: N/A    Number of children: 3    Years of education: N/A     Occupational History    retired---[employer]      Social History Main Topics    Smoking status: Current Every Day Smoker     Packs/day: 0.50     Years: 55.00     Types: Cigarettes     Start date: 1/1/1962     Last attempt to quit: 1/10/2017    Smokeless tobacco: Never Used      Comment: quit for about 6 months then started back    Alcohol use No    Drug use: No    Sexual activity: Yes     Partners: Male     Other Topics Concern    Not on file     Social History Narrative    No narrative on file      Ms. James Harris current medications are   Outpatient Medications Prior to Visit   Medication Sig Dispense Refill    oxyCODONE-acetaminophen (PERCOCET) 7.5-325 MG per tablet Take 1 tablet by mouth every 6 hours as needed for Pain  Max 4 a day.  112 tablet 0    Magnesium Oxide 400 MG CAPS Take 1 tablet by mouth BID 60 capsule 0    amitriptyline (ELAVIL) 25 MG tablet TAKE 1 TABLET BY MOUTH NIGHTLY 30 tablet 0    gabapentin (NEURONTIN) 600 MG tablet diaphoretic. Pulmonary/Chest: Effort normal. No respiratory distress or use of accessory muscles. Auscultation revealing normal air entry. She does not have wheezes in the lung fields. She has no rales. Decreased air exchange   Cardiovascular: Normal rate, regular rhythm, normal heart sounds, and does not have murmur. Exam reveals no gallop and no friction rub. Abdominal: Soft, bowel sounds are normal.  On inspection of abdomen is flat no distension and no mass. Musculoskeletal / Extremities: Range of motion is normal. Gait is normal, assistive devices use: none. She exhibits edema: none, and no tenderness. Neurological/Psychiatric:She is alert and oriented to person, place, and time. Coordination is  normal.   Judgement and Insight is normal  Her mood is Appropriate and affect is Flat/blunted . Her behavior is normal.   Thought content normal.        IMPRESSION:     1. Moderate episode of recurrent major depressive disorder (HCC) - INADEQUATELY CONTROLLED: treatment plan adjusted as below   2. Fibromyalgia  - WORSENING: treatment plan changed as below   3. Chronic pain syndrome  - WORSENING: treatment plan changed as below   4. DDD (degenerative disc disease), lumbar - STABLE: Continue current treatment plan   5. Osteoarthritis of spine with radiculopathy, lumbar region - STABLE: Continue current treatment plan   6. Lumbar spondylosis - STABLE: Continue current treatment plan   7. Primary insomnia - STABLE: Continue current treatment plan   8. Drug-induced constipation - STABLE: Continue current treatment plan       PLAN:   Informed verbal consent regarding treatment plan was obtained from Ms. Hailey Angel record was obtained and reviewed  for the last one year and no indicators of drug misuse  were found. Any other controlled substance prescriptions  seen on the record have been accounted for, I am aware of the patient receiving these medications. Inocencio Palomino  [facility]cked as part of office therapeutic goals with special emphasis on  1. Improvement in perceived interfernce  of pain with ADL's. Ability to do home exercises independently. Ability to do household chores indoor and/or outdoor work and social and leisure activities. To increase flexibility/ROM, strength and endurance. Improve psychosocial and physical functioning.- she is not showing any significant progress/or showing regression  towards this goal and reassessment and adjustment of goals/treatment have been made. 2. Improving sleep to 6-7 hours a night. Improve mood/ anxiety and depression symptoms such as crying spells, low energy, problems with concentration, motivation. - she is showing progression towards this treatment goal with the current regimen. 3. Reduction of reliance on opioid analgesia/more appropriate opioid use. - she is showing progression towards this treatment goal with the current regimen. Risks and benefits of the medications and other alternative treatments have been/were  discussed with the patient. Any questions on the  common side effects of these medications were also answered. She was advised against drinking alcohol with the narcotic pain medicines, advised against driving or handling machinery when  starting or adjusting the dose of medicines, feeling groggy or drowsy, or if having any cognitive issues related to the current medications. Sheis fully aware of the risk of overdose and death, if medicines are misused and not taken as prescribed. If she develops new symptoms or if the symptoms worsen, she was told to call the office. Thank you for allowing me to participate in the care of this patient. Dayna Edmondson MD.    Cc: Yessi Reddy, FRED    I, Lillian Casiano, am scribing for and in the presence of Dr. Dayna Edmondson.    12/01/17  10:51 AM  Javier Tomas MA

## 2017-12-28 ENCOUNTER — OFFICE VISIT (OUTPATIENT)
Dept: PAIN MANAGEMENT | Age: 72
End: 2017-12-28

## 2017-12-28 VITALS
BODY MASS INDEX: 26.61 KG/M2 | SYSTOLIC BLOOD PRESSURE: 135 MMHG | HEART RATE: 62 BPM | WEIGHT: 155 LBS | DIASTOLIC BLOOD PRESSURE: 78 MMHG

## 2017-12-28 DIAGNOSIS — F51.01 PRIMARY INSOMNIA: ICD-10-CM

## 2017-12-28 DIAGNOSIS — M47.816 OSTEOARTHRITIS OF LUMBAR SPINE, UNSPECIFIED SPINAL OSTEOARTHRITIS COMPLICATION STATUS: ICD-10-CM

## 2017-12-28 DIAGNOSIS — M47.26 OSTEOARTHRITIS OF SPINE WITH RADICULOPATHY, LUMBAR REGION: ICD-10-CM

## 2017-12-28 DIAGNOSIS — G89.4 CHRONIC PAIN SYNDROME: ICD-10-CM

## 2017-12-28 DIAGNOSIS — F33.1 MODERATE EPISODE OF RECURRENT MAJOR DEPRESSIVE DISORDER (HCC): ICD-10-CM

## 2017-12-28 DIAGNOSIS — M51.36 DDD (DEGENERATIVE DISC DISEASE), LUMBAR: ICD-10-CM

## 2017-12-28 DIAGNOSIS — M47.816 LUMBAR SPONDYLOSIS: ICD-10-CM

## 2017-12-28 DIAGNOSIS — M79.7 FIBROMYALGIA: ICD-10-CM

## 2017-12-28 PROCEDURE — G8417 CALC BMI ABV UP PARAM F/U: HCPCS | Performed by: INTERNAL MEDICINE

## 2017-12-28 PROCEDURE — 4004F PT TOBACCO SCREEN RCVD TLK: CPT | Performed by: INTERNAL MEDICINE

## 2017-12-28 PROCEDURE — G8484 FLU IMMUNIZE NO ADMIN: HCPCS | Performed by: INTERNAL MEDICINE

## 2017-12-28 PROCEDURE — G8399 PT W/DXA RESULTS DOCUMENT: HCPCS | Performed by: INTERNAL MEDICINE

## 2017-12-28 PROCEDURE — 3017F COLORECTAL CA SCREEN DOC REV: CPT | Performed by: INTERNAL MEDICINE

## 2017-12-28 PROCEDURE — 4040F PNEUMOC VAC/ADMIN/RCVD: CPT | Performed by: INTERNAL MEDICINE

## 2017-12-28 PROCEDURE — 99213 OFFICE O/P EST LOW 20 MIN: CPT | Performed by: INTERNAL MEDICINE

## 2017-12-28 PROCEDURE — G8427 DOCREV CUR MEDS BY ELIG CLIN: HCPCS | Performed by: INTERNAL MEDICINE

## 2017-12-28 PROCEDURE — 1090F PRES/ABSN URINE INCON ASSESS: CPT | Performed by: INTERNAL MEDICINE

## 2017-12-28 PROCEDURE — 1123F ACP DISCUSS/DSCN MKR DOCD: CPT | Performed by: INTERNAL MEDICINE

## 2017-12-28 PROCEDURE — 3014F SCREEN MAMMO DOC REV: CPT | Performed by: INTERNAL MEDICINE

## 2017-12-28 RX ORDER — TIZANIDINE 4 MG/1
TABLET ORAL
Qty: 30 TABLET | Refills: 0 | Status: SHIPPED | OUTPATIENT
Start: 2017-12-28 | End: 2018-01-23 | Stop reason: SDUPTHER

## 2017-12-28 RX ORDER — OXYCODONE AND ACETAMINOPHEN 7.5; 325 MG/1; MG/1
1 TABLET ORAL EVERY 6 HOURS PRN
Qty: 112 TABLET | Refills: 0 | Status: SHIPPED | OUTPATIENT
Start: 2017-12-28 | End: 2018-01-23 | Stop reason: SDUPTHER

## 2017-12-28 RX ORDER — AMITRIPTYLINE HYDROCHLORIDE 25 MG/1
TABLET, FILM COATED ORAL
Qty: 30 TABLET | Refills: 0 | Status: SHIPPED | OUTPATIENT
Start: 2017-12-28 | End: 2018-01-23 | Stop reason: SDUPTHER

## 2017-12-28 RX ORDER — GABAPENTIN 600 MG/1
600 TABLET ORAL 3 TIMES DAILY
Qty: 90 TABLET | Refills: 0 | Status: SHIPPED | OUTPATIENT
Start: 2017-12-28 | End: 2018-01-23 | Stop reason: SDUPTHER

## 2017-12-28 RX ORDER — DULOXETIN HYDROCHLORIDE 60 MG/1
CAPSULE, DELAYED RELEASE ORAL
Qty: 30 CAPSULE | Refills: 0 | Status: SHIPPED | OUTPATIENT
Start: 2017-12-28 | End: 2018-01-23 | Stop reason: SDUPTHER

## 2017-12-28 RX ORDER — CALCIUM CARBONATE/VITAMIN D3 500-10/5ML
LIQUID (ML) ORAL
Qty: 60 CAPSULE | Refills: 0 | Status: SHIPPED | OUTPATIENT
Start: 2017-12-28 | End: 2018-01-23 | Stop reason: SDUPTHER

## 2017-12-28 RX ORDER — DULOXETIN HYDROCHLORIDE 30 MG/1
CAPSULE, DELAYED RELEASE ORAL
Qty: 30 CAPSULE | Refills: 0 | Status: SHIPPED | OUTPATIENT
Start: 2017-12-28 | End: 2018-01-23 | Stop reason: SDUPTHER

## 2017-12-28 RX ORDER — MELOXICAM 15 MG/1
TABLET ORAL
Qty: 30 TABLET | Refills: 0 | Status: SHIPPED | OUTPATIENT
Start: 2017-12-28 | End: 2018-01-23 | Stop reason: SDUPTHER

## 2017-12-28 NOTE — PROGRESS NOTES
needed for Pain  Max 4 a day. 112 tablet 0    tiZANidine (ZANAFLEX) 4 MG tablet Take one tablet po at 9 pm PRN 30 tablet 0    meloxicam (MOBIC) 15 MG tablet TAKE 1 TABLET BY MOUTH DAILY 30 tablet 0    gabapentin (NEURONTIN) 600 MG tablet Take 1 tablet by mouth 3 times daily 90 tablet 0    amitriptyline (ELAVIL) 25 MG tablet TAKE 1 TABLET BY MOUTH NIGHTLY 30 tablet 0    DULoxetine (CYMBALTA) 30 MG extended release capsule Take one talet po in the am 30 capsule 0    DULoxetine (CYMBALTA) 60 MG extended release capsule Take one tablet at night 30 capsule 0    Magnesium Oxide 400 MG CAPS Take 1 tablet by mouth BID 60 capsule 0    pantoprazole (PROTONIX) 20 MG tablet TAKE 1 TABLET BY MOUTH DAILY 30 tablet 5    atorvastatin (LIPITOR) 80 MG tablet TAKE 1 TABLET BY MOUTH EVERY DAY FOR CHOLESTEROL 30 tablet 5    Fluticasone Furoate-Vilanterol (BREO ELLIPTA) 200-25 MCG/INH AEPB INHALE 1 PUFF INTO THE LUNGS DAILY 28 each 1    vitamin D (ERGOCALCIFEROL) 60367 units CAPS capsule TAKE 1 CAPSULE BY MOUTH TWICE WEEKLY 24 capsule 1    calcium carbonate 600 MG TABS tablet Take 1 tablet by mouth 2 times daily      albuterol (PROVENTIL HFA;VENTOLIN HFA) 108 (90 BASE) MCG/ACT inhaler Inhale 2 puffs into the lungs every 4 hours as needed. 1 Inhaler 5     No facility-administered medications prior to visit. SOCIAL/FAMILY/PAST MEDICAL HISTORY: Ms. Radha Saucedo, family and past medical history was reviewed. REVIEW OF SYSTEMS:    Respiratory: Negative for apnea, chest tightness and shortness of breath or change in baseline breathing. Gastrointestinal: Negative for nausea, vomiting, abdominal pain, diarrhea, constipation, blood in stool and abdominal distention. PHYSICAL EXAM:   Nursing note and vitals reviewed. /78 (Site: Right Arm, Position: Sitting)   Pulse 62   Wt 155 lb (70.3 kg)   Breastfeeding? No   BMI 26.61 kg/m²   Constitutional: She appears well-developed and well-nourished.  No acute

## 2018-01-23 ENCOUNTER — OFFICE VISIT (OUTPATIENT)
Dept: PAIN MANAGEMENT | Age: 73
End: 2018-01-23

## 2018-01-23 VITALS
WEIGHT: 154.1 LBS | SYSTOLIC BLOOD PRESSURE: 125 MMHG | DIASTOLIC BLOOD PRESSURE: 63 MMHG | BODY MASS INDEX: 26.45 KG/M2 | HEART RATE: 68 BPM

## 2018-01-23 DIAGNOSIS — M79.7 FIBROMYALGIA: ICD-10-CM

## 2018-01-23 DIAGNOSIS — M47.816 OSTEOARTHRITIS OF LUMBAR SPINE, UNSPECIFIED SPINAL OSTEOARTHRITIS COMPLICATION STATUS: ICD-10-CM

## 2018-01-23 DIAGNOSIS — M51.36 DDD (DEGENERATIVE DISC DISEASE), LUMBAR: ICD-10-CM

## 2018-01-23 DIAGNOSIS — M47.26 OSTEOARTHRITIS OF SPINE WITH RADICULOPATHY, LUMBAR REGION: ICD-10-CM

## 2018-01-23 DIAGNOSIS — F51.01 PRIMARY INSOMNIA: ICD-10-CM

## 2018-01-23 DIAGNOSIS — F33.1 MODERATE EPISODE OF RECURRENT MAJOR DEPRESSIVE DISORDER (HCC): ICD-10-CM

## 2018-01-23 DIAGNOSIS — G89.4 CHRONIC PAIN SYNDROME: ICD-10-CM

## 2018-01-23 DIAGNOSIS — M47.816 LUMBAR SPONDYLOSIS: ICD-10-CM

## 2018-01-23 PROCEDURE — 4004F PT TOBACCO SCREEN RCVD TLK: CPT | Performed by: INTERNAL MEDICINE

## 2018-01-23 PROCEDURE — 1090F PRES/ABSN URINE INCON ASSESS: CPT | Performed by: INTERNAL MEDICINE

## 2018-01-23 PROCEDURE — 99213 OFFICE O/P EST LOW 20 MIN: CPT | Performed by: INTERNAL MEDICINE

## 2018-01-23 PROCEDURE — 3014F SCREEN MAMMO DOC REV: CPT | Performed by: INTERNAL MEDICINE

## 2018-01-23 PROCEDURE — G8484 FLU IMMUNIZE NO ADMIN: HCPCS | Performed by: INTERNAL MEDICINE

## 2018-01-23 PROCEDURE — 1123F ACP DISCUSS/DSCN MKR DOCD: CPT | Performed by: INTERNAL MEDICINE

## 2018-01-23 PROCEDURE — 3017F COLORECTAL CA SCREEN DOC REV: CPT | Performed by: INTERNAL MEDICINE

## 2018-01-23 PROCEDURE — G8417 CALC BMI ABV UP PARAM F/U: HCPCS | Performed by: INTERNAL MEDICINE

## 2018-01-23 PROCEDURE — 4040F PNEUMOC VAC/ADMIN/RCVD: CPT | Performed by: INTERNAL MEDICINE

## 2018-01-23 PROCEDURE — G8427 DOCREV CUR MEDS BY ELIG CLIN: HCPCS | Performed by: INTERNAL MEDICINE

## 2018-01-23 PROCEDURE — G8399 PT W/DXA RESULTS DOCUMENT: HCPCS | Performed by: INTERNAL MEDICINE

## 2018-01-23 RX ORDER — DULOXETIN HYDROCHLORIDE 60 MG/1
CAPSULE, DELAYED RELEASE ORAL
Qty: 30 CAPSULE | Refills: 0 | Status: SHIPPED | OUTPATIENT
Start: 2018-01-23 | End: 2018-02-20 | Stop reason: SDUPTHER

## 2018-01-23 RX ORDER — GABAPENTIN 600 MG/1
600 TABLET ORAL 3 TIMES DAILY
Qty: 90 TABLET | Refills: 0 | Status: SHIPPED | OUTPATIENT
Start: 2018-01-23 | End: 2018-02-20 | Stop reason: SDUPTHER

## 2018-01-23 RX ORDER — TIZANIDINE 4 MG/1
TABLET ORAL
Qty: 30 TABLET | Refills: 0 | Status: SHIPPED | OUTPATIENT
Start: 2018-01-23 | End: 2018-02-20 | Stop reason: SDUPTHER

## 2018-01-23 RX ORDER — AMITRIPTYLINE HYDROCHLORIDE 25 MG/1
TABLET, FILM COATED ORAL
Qty: 30 TABLET | Refills: 0 | Status: SHIPPED | OUTPATIENT
Start: 2018-01-23 | End: 2018-02-20 | Stop reason: SDUPTHER

## 2018-01-23 RX ORDER — DULOXETIN HYDROCHLORIDE 30 MG/1
CAPSULE, DELAYED RELEASE ORAL
Qty: 30 CAPSULE | Refills: 0 | Status: SHIPPED | OUTPATIENT
Start: 2018-01-23 | End: 2018-02-20 | Stop reason: SDUPTHER

## 2018-01-23 RX ORDER — CALCIUM CARBONATE/VITAMIN D3 500-10/5ML
LIQUID (ML) ORAL
Qty: 60 CAPSULE | Refills: 0 | Status: SHIPPED | OUTPATIENT
Start: 2018-01-23 | End: 2018-02-20 | Stop reason: SDUPTHER

## 2018-01-23 RX ORDER — OXYCODONE AND ACETAMINOPHEN 7.5; 325 MG/1; MG/1
1 TABLET ORAL EVERY 6 HOURS PRN
Qty: 112 TABLET | Refills: 0 | Status: SHIPPED | OUTPATIENT
Start: 2018-01-23 | End: 2018-02-20 | Stop reason: SDUPTHER

## 2018-01-23 RX ORDER — MELOXICAM 15 MG/1
TABLET ORAL
Qty: 30 TABLET | Refills: 0 | Status: SHIPPED | OUTPATIENT
Start: 2018-01-23 | End: 2018-02-20 | Stop reason: SDUPTHER

## 2018-01-23 NOTE — PROGRESS NOTES
OF SYSTEMS:    Respiratory: Negative for apnea, chest tightness and shortness of breath or change in baseline breathing. Gastrointestinal: Negative for nausea, vomiting, abdominal pain, diarrhea, constipation, blood in stool and abdominal distention. PHYSICAL EXAM:   Nursing note and vitals reviewed. /63   Pulse 68   Wt 154 lb 1.6 oz (69.9 kg)   BMI 26.45 kg/m²   Constitutional: She appears well-developed and well-nourished. No acute distress. Skin: Skin is warm and dry, good turgor. No rash noted. She is not diaphoretic. Cardiovascular: Normal rate, regular rhythm, normal heart sounds, and does not have murmur. Pulmonary/Chest: Effort normal. No respiratory distress. She does not have wheezes in the lung fields. She has no rales. decrease air exchange  Neurological/Psychiatric:She is alert and oriented to person, place, and time. Coordination is  normal. Her mood isAppropriate and affect is Neutral/Euthymic(normal) . IMPRESSION:   1. Chronic pain syndrome    2. Fibromyalgia    3. DDD (degenerative disc disease), lumbar    4. Lumbar spondylosis        PLAN:  Informed verbal consent was obtained  -continue with current regimen  -Adv Biofeedback, relaxation and meditation techniques. Referral to psychologist for CBT was also discussed with patient  -walking and stretching as advised  -she was advised proper sleep hygiene-told to avoid:use of caffeine or other stimulants after noon, alcohol use near bedtime, long or frequent naps during the day, erratic sleep schedule, heavy meals near bedtime, vigorous exercise near bedtime and use of electronic devices near bedtime  Urine drug screen with GC/MS for opiates and drugs of abuse was ordered and will follow up on results.      Current Outpatient Prescriptions   Medication Sig Dispense Refill    BREO ELLIPTA 200-25 MCG/INH AEPB INHALE 1 PUFF INTO THE LUNGS DAILY 1 each 3    oxyCODONE-acetaminophen (PERCOCET) 7.5-325 MG per tablet Take 1 improvement in physical performance, general activity, work or disability,emotional distress, health care utilization and  decreased medication consumption. Will continue to monitor progress towards achieving/maintaining therapeutic goals with special emphasis on  1. Improvement in perceived interfernce  of pain with ADL's. Ability to do home exercises independently. Ability to do household chores indoor and/or outdoor work and social and leisure activities. Improve psychosocial and physical functioning. - she is showing progression towards this treatment goal with the current regimen. She was advised against drinking alcohol with the narcotic pain medicines, advised against driving or handling machinery while adjusting the dose of medicines or if having cognitive  issues related to the current medications. Risk of overdose and death, if medicines not taken as prescribed, were also discussed. If the patient develops new symptoms or if the symptoms worsen, the patient should call the office. While transcribing every attempt was made to maintain the accuracy of the note in terms of it's contents,there may have been some errors made inadvertently. Thank you for allowing me to participate in the care of this patient. Mariela Pretty MD.    Cc: Marisol Vang CNP    I, Valery Ludwig, scribing for in the presence  of Dr. Mariela Pretty.   01/23/18  11:31 AM  Willis Stokes Assistant  I, Dr. Mariela Pretty, personally performed the services described in this documentation as scribed by  Valery Ludwig MA in my presence and it is both accurate and complete

## 2018-02-20 ENCOUNTER — OFFICE VISIT (OUTPATIENT)
Dept: PAIN MANAGEMENT | Age: 73
End: 2018-02-20

## 2018-02-20 VITALS — BODY MASS INDEX: 26.78 KG/M2 | WEIGHT: 156 LBS

## 2018-02-20 DIAGNOSIS — M47.816 LUMBAR SPONDYLOSIS: ICD-10-CM

## 2018-02-20 DIAGNOSIS — G89.4 CHRONIC PAIN SYNDROME: ICD-10-CM

## 2018-02-20 DIAGNOSIS — M79.7 FIBROMYALGIA: ICD-10-CM

## 2018-02-20 DIAGNOSIS — M47.26 OSTEOARTHRITIS OF SPINE WITH RADICULOPATHY, LUMBAR REGION: ICD-10-CM

## 2018-02-20 DIAGNOSIS — M51.36 DDD (DEGENERATIVE DISC DISEASE), LUMBAR: ICD-10-CM

## 2018-02-20 PROCEDURE — G8399 PT W/DXA RESULTS DOCUMENT: HCPCS | Performed by: INTERNAL MEDICINE

## 2018-02-20 PROCEDURE — 3014F SCREEN MAMMO DOC REV: CPT | Performed by: INTERNAL MEDICINE

## 2018-02-20 PROCEDURE — 1090F PRES/ABSN URINE INCON ASSESS: CPT | Performed by: INTERNAL MEDICINE

## 2018-02-20 PROCEDURE — G8417 CALC BMI ABV UP PARAM F/U: HCPCS | Performed by: INTERNAL MEDICINE

## 2018-02-20 PROCEDURE — 4040F PNEUMOC VAC/ADMIN/RCVD: CPT | Performed by: INTERNAL MEDICINE

## 2018-02-20 PROCEDURE — 4004F PT TOBACCO SCREEN RCVD TLK: CPT | Performed by: INTERNAL MEDICINE

## 2018-02-20 PROCEDURE — G8427 DOCREV CUR MEDS BY ELIG CLIN: HCPCS | Performed by: INTERNAL MEDICINE

## 2018-02-20 PROCEDURE — 3017F COLORECTAL CA SCREEN DOC REV: CPT | Performed by: INTERNAL MEDICINE

## 2018-02-20 PROCEDURE — G8484 FLU IMMUNIZE NO ADMIN: HCPCS | Performed by: INTERNAL MEDICINE

## 2018-02-20 PROCEDURE — 1123F ACP DISCUSS/DSCN MKR DOCD: CPT | Performed by: INTERNAL MEDICINE

## 2018-02-20 PROCEDURE — 99213 OFFICE O/P EST LOW 20 MIN: CPT | Performed by: INTERNAL MEDICINE

## 2018-02-20 RX ORDER — DULOXETIN HYDROCHLORIDE 30 MG/1
CAPSULE, DELAYED RELEASE ORAL
Qty: 30 CAPSULE | Refills: 0 | Status: SHIPPED | OUTPATIENT
Start: 2018-02-20 | End: 2018-03-22 | Stop reason: SDUPTHER

## 2018-02-20 RX ORDER — AMITRIPTYLINE HYDROCHLORIDE 25 MG/1
TABLET, FILM COATED ORAL
Qty: 30 TABLET | Refills: 0 | Status: SHIPPED | OUTPATIENT
Start: 2018-02-20 | End: 2018-03-22 | Stop reason: SDUPTHER

## 2018-02-20 RX ORDER — CALCIUM CARBONATE/VITAMIN D3 500-10/5ML
LIQUID (ML) ORAL
Qty: 60 CAPSULE | Refills: 0 | Status: SHIPPED | OUTPATIENT
Start: 2018-02-20 | End: 2018-03-22 | Stop reason: SDUPTHER

## 2018-02-20 RX ORDER — MELOXICAM 15 MG/1
TABLET ORAL
Qty: 30 TABLET | Refills: 0 | Status: SHIPPED | OUTPATIENT
Start: 2018-02-20 | End: 2018-03-22 | Stop reason: SDUPTHER

## 2018-02-20 RX ORDER — DULOXETIN HYDROCHLORIDE 60 MG/1
CAPSULE, DELAYED RELEASE ORAL
Qty: 30 CAPSULE | Refills: 0 | Status: SHIPPED | OUTPATIENT
Start: 2018-02-20 | End: 2018-03-22 | Stop reason: SDUPTHER

## 2018-02-20 RX ORDER — GABAPENTIN 600 MG/1
600 TABLET ORAL 3 TIMES DAILY
Qty: 90 TABLET | Refills: 0 | Status: SHIPPED | OUTPATIENT
Start: 2018-02-20 | End: 2018-03-22 | Stop reason: SDUPTHER

## 2018-02-20 RX ORDER — OXYCODONE AND ACETAMINOPHEN 7.5; 325 MG/1; MG/1
1 TABLET ORAL EVERY 6 HOURS PRN
Qty: 120 TABLET | Refills: 0 | Status: SHIPPED | OUTPATIENT
Start: 2018-02-20 | End: 2018-03-22 | Stop reason: SDUPTHER

## 2018-02-20 RX ORDER — TIZANIDINE 4 MG/1
TABLET ORAL
Qty: 30 TABLET | Refills: 0 | Status: SHIPPED | OUTPATIENT
Start: 2018-02-20 | End: 2018-03-22 | Stop reason: SDUPTHER

## 2018-02-20 NOTE — PROGRESS NOTES
Guilherme Solomon  1945  [id number]    HISTORY OF PRESENT ILLNESS:  Ms. Jony Toth is a 81 y.o. female returns for a follow up visit for multiple medical problems. Her current presenting problems are   1. Fibromyalgia    2. Chronic pain syndrome    3. Lumbar spondylosis    4. DDD (degenerative disc disease), lumbar    5. Osteoarthritis of lumbar spine, unspecified spinal osteoarthritis complication status    6. Osteoarthritis of spine with radiculopathy, lumbar region    7. Moderate episode of recurrent major depressive disorder ([facility], [place] [address]    8. Primary insomnia    . As per information/history obtained from the PADT(patient assessment and documentation tool) - She complains of pain in the lower back with radiation to the buttocks She rates the pain 4/10 and describes it as sharp, aching. Pain is made worse by: standing. Current treatment regimen has helped relieve about 60% of the pain. She denies side effects from the current pain regimen. Patient reports that since the last follow up visit the physical functioning is unchanged, family/social relationships are unchanged, mood is unchanged and sleep patterns are unchanged, and that the overall functioning is unchanged. Patient denies neurological bowel or bladder. Patient denies misusing/abusing her narcotic pain medications or using any illegal drugs. There are No indicators for possible drug abuse, addiction or diversion problems. Upon obtaining the medical history from Ms. Jony Toth regarding today's office visit for her presenting problems, Patient reports that the pain is fairly well tolerated with the current regimen has had some exacerbations, but overall has been tolerable. Ms. Jony Toth states that  she has been staying with her exercise routine and working indoors and/or outdoors as tolerated, performing household chores. Patient states she has been doing ok with the medications. She states she has been using Percocet 4 times per day.  She says she has had more facility-administered medications prior to visit. SOCIAL/FAMILY/PAST MEDICAL HISTORY: Ms. Indu Ya, family and past medical history was reviewed. REVIEW OF SYSTEMS:    Respiratory: Negative for apnea, chest tightness and shortness of breath or change in baseline breathing. Gastrointestinal: Negative for nausea, vomiting, abdominal pain, diarrhea, constipation, blood in stool and abdominal distention. PHYSICAL EXAM:   Nursing note and vitals reviewed. Wt 156 lb (70.8 kg)   BMI 26.78 kg/m²   Constitutional: She appears well-developed and well-nourished. No acute distress. Skin: Skin is warm and dry, good turgor. No rash noted. She is not diaphoretic. Cardiovascular: Normal rate, regular rhythm, normal heart sounds, and does not have murmur. Pulmonary/Chest: Effort normal. No respiratory distress. She does not have wheezes in the lung fields. She has no rales. Neurological/Psychiatric:She is alert and oriented to person, place, and time. Coordination is  normal. Her mood isAppropriate and affect is Flat/blunted and Anxious . IMPRESSION:   1. Fibromyalgia    2. Chronic pain syndrome    3. Lumbar spondylosis    4. DDD (degenerative disc disease), lumbar    5. Osteoarthritis of spine with radiculopathy, lumbar region        PLAN:  Informed verbal consent was obtained  -Continue with current regimen   -ROM exercises as advised   -Advised patient to quit smoking for  health related concerns and to improve the treatment outcomes. Education was given on quitting smoking and the use of different modalities including medications, hypnotherapy, counselling  and biofeedback.  These were discussed with patient.  -she was advised proper sleep hygiene-told to avoid:use of caffeine or other stimulants after noon, alcohol use near bedtime, long or frequent naps during the day, erratic sleep schedule, heavy meals near bedtime, vigorous exercise near bedtime and use of electronic devices near bedtime  -Continue with Elavil   -Adv Biofeedback, relaxation and meditation techniques. Referral to psychologist for CBT was also discussed with patient  Walking as tolerated, 20 minutes daily      Current Outpatient Prescriptions   Medication Sig Dispense Refill    oxyCODONE-acetaminophen (PERCOCET) 7.5-325 MG per tablet Take 1 tablet by mouth every 6 hours as needed for Pain for up to 28 days Max 4 a day. 112 tablet 0    tiZANidine (ZANAFLEX) 4 MG tablet Take one tablet po at 9 pm PRN 30 tablet 0    DULoxetine (CYMBALTA) 30 MG extended release capsule Take one talet po in the am 30 capsule 0    DULoxetine (CYMBALTA) 60 MG extended release capsule Take one tablet at night 30 capsule 0    Magnesium Oxide 400 MG CAPS Take 1 tablet by mouth BID 60 capsule 0    amitriptyline (ELAVIL) 25 MG tablet TAKE 1 TABLET BY MOUTH NIGHTLY 30 tablet 0    gabapentin (NEURONTIN) 600 MG tablet Take 1 tablet by mouth 3 times daily for 30 days. 90 tablet 0    meloxicam (MOBIC) 15 MG tablet TAKE 1 TABLET BY MOUTH DAILY 30 tablet 0    BREO ELLIPTA 200-25 MCG/INH AEPB INHALE 1 PUFF INTO THE LUNGS DAILY 1 each 3    pantoprazole (PROTONIX) 20 MG tablet TAKE 1 TABLET BY MOUTH DAILY 30 tablet 5    atorvastatin (LIPITOR) 80 MG tablet TAKE 1 TABLET BY MOUTH EVERY DAY FOR CHOLESTEROL 30 tablet 5    vitamin D (ERGOCALCIFEROL) 49073 units CAPS capsule TAKE 1 CAPSULE BY MOUTH TWICE WEEKLY 24 capsule 1    calcium carbonate 600 MG TABS tablet Take 1 tablet by mouth 2 times daily      albuterol (PROVENTIL HFA;VENTOLIN HFA) 108 (90 BASE) MCG/ACT inhaler Inhale 2 puffs into the lungs every 4 hours as needed. 1 Inhaler 5     No current facility-administered medications for this visit. I will continue her current medication regimen  which is part of the above treatment schedule. It has been helping with Ms. Jones Martin chronic  medical problems which for this visit include: The primary encounter diagnosis was Fibromyalgia.  Diagnoses of Chronic pain syndrome and Lumbar spondylosis were also pertinent to this visit. Risks and benefits of the medications and other alternative treatments  including no treatment were discussed with the patient. The common side effects of these medications were also explained to the patient. Informed verbal consent was obtained. Goals of current treatment regimen include improvement in pain, restoration of functioning- with focus on improvement in physical performance, general activity, work or disability,emotional distress, health care utilization and  decreased medication consumption. Will continue to monitor progress towards achieving/maintaining therapeutic goals with special emphasis on  1. Improvement in perceived interfernce  of pain with ADL's. Ability to do home exercises independently. Ability to do household chores indoor and/or outdoor work and social and leisure activities. Improve psychosocial and physical functioning. - she is showing progression towards this treatment goal with the current regimen. She was advised against drinking alcohol with the narcotic pain medicines, advised against driving or handling machinery while adjusting the dose of medicines or if having cognitive  issues related to the current medications. Risk of overdose and death, if medicines not taken as prescribed, were also discussed. If the patient develops new symptoms or if the symptoms worsen, the patient should call the office. While transcribing every attempt was made to maintain the accuracy of the note in terms of it's contents,there may have been some errors made inadvertently. Thank you for allowing me to participate in the care of this patient.     Yoon Dietz MD.    Cc: Gene Mccabe CNP    I, Talah Larkin, am scribing for and in the presence of Dr. Yoon Dietz.   02/20/18  9:36 AM  BRITTANEY Magaña, Dr. Yoon Dietz, personally performed the services described in this documentation as scribed by   Chiquita Bhakta MA in my presence and it is both accurate and complete

## 2018-03-22 ENCOUNTER — OFFICE VISIT (OUTPATIENT)
Dept: PAIN MANAGEMENT | Age: 73
End: 2018-03-22

## 2018-03-22 VITALS
BODY MASS INDEX: 26.06 KG/M2 | WEIGHT: 151.8 LBS | SYSTOLIC BLOOD PRESSURE: 139 MMHG | HEART RATE: 90 BPM | DIASTOLIC BLOOD PRESSURE: 77 MMHG

## 2018-03-22 DIAGNOSIS — M47.26 OSTEOARTHRITIS OF SPINE WITH RADICULOPATHY, LUMBAR REGION: ICD-10-CM

## 2018-03-22 DIAGNOSIS — M47.816 LUMBAR SPONDYLOSIS: ICD-10-CM

## 2018-03-22 DIAGNOSIS — G89.4 CHRONIC PAIN SYNDROME: ICD-10-CM

## 2018-03-22 DIAGNOSIS — M79.7 FIBROMYALGIA: ICD-10-CM

## 2018-03-22 DIAGNOSIS — M51.36 DDD (DEGENERATIVE DISC DISEASE), LUMBAR: ICD-10-CM

## 2018-03-22 PROCEDURE — 4040F PNEUMOC VAC/ADMIN/RCVD: CPT | Performed by: INTERNAL MEDICINE

## 2018-03-22 PROCEDURE — G8417 CALC BMI ABV UP PARAM F/U: HCPCS | Performed by: INTERNAL MEDICINE

## 2018-03-22 PROCEDURE — 3014F SCREEN MAMMO DOC REV: CPT | Performed by: INTERNAL MEDICINE

## 2018-03-22 PROCEDURE — G8482 FLU IMMUNIZE ORDER/ADMIN: HCPCS | Performed by: INTERNAL MEDICINE

## 2018-03-22 PROCEDURE — G8399 PT W/DXA RESULTS DOCUMENT: HCPCS | Performed by: INTERNAL MEDICINE

## 2018-03-22 PROCEDURE — 3017F COLORECTAL CA SCREEN DOC REV: CPT | Performed by: INTERNAL MEDICINE

## 2018-03-22 PROCEDURE — 4004F PT TOBACCO SCREEN RCVD TLK: CPT | Performed by: INTERNAL MEDICINE

## 2018-03-22 PROCEDURE — 1123F ACP DISCUSS/DSCN MKR DOCD: CPT | Performed by: INTERNAL MEDICINE

## 2018-03-22 PROCEDURE — 1090F PRES/ABSN URINE INCON ASSESS: CPT | Performed by: INTERNAL MEDICINE

## 2018-03-22 PROCEDURE — G8427 DOCREV CUR MEDS BY ELIG CLIN: HCPCS | Performed by: INTERNAL MEDICINE

## 2018-03-22 PROCEDURE — 99213 OFFICE O/P EST LOW 20 MIN: CPT | Performed by: INTERNAL MEDICINE

## 2018-03-22 RX ORDER — MELOXICAM 15 MG/1
TABLET ORAL
Qty: 30 TABLET | Refills: 0 | Status: SHIPPED | OUTPATIENT
Start: 2018-03-22 | End: 2018-04-19 | Stop reason: SDUPTHER

## 2018-03-22 RX ORDER — TIZANIDINE 4 MG/1
TABLET ORAL
Qty: 60 TABLET | Refills: 0 | Status: SHIPPED | OUTPATIENT
Start: 2018-03-22 | End: 2018-04-19 | Stop reason: SDUPTHER

## 2018-03-22 RX ORDER — DULOXETIN HYDROCHLORIDE 60 MG/1
CAPSULE, DELAYED RELEASE ORAL
Qty: 30 CAPSULE | Refills: 0 | Status: SHIPPED | OUTPATIENT
Start: 2018-03-22 | End: 2018-04-19 | Stop reason: SDUPTHER

## 2018-03-22 RX ORDER — GABAPENTIN 600 MG/1
600 TABLET ORAL 3 TIMES DAILY
Qty: 90 TABLET | Refills: 0 | Status: SHIPPED | OUTPATIENT
Start: 2018-03-22 | End: 2018-04-19 | Stop reason: SDUPTHER

## 2018-03-22 RX ORDER — AMITRIPTYLINE HYDROCHLORIDE 25 MG/1
TABLET, FILM COATED ORAL
Qty: 30 TABLET | Refills: 0 | Status: SHIPPED | OUTPATIENT
Start: 2018-03-22 | End: 2018-04-19 | Stop reason: SDUPTHER

## 2018-03-22 RX ORDER — CALCIUM CARBONATE/VITAMIN D3 500-10/5ML
LIQUID (ML) ORAL
Qty: 60 CAPSULE | Refills: 0 | Status: SHIPPED | OUTPATIENT
Start: 2018-03-22 | End: 2018-04-19 | Stop reason: SDUPTHER

## 2018-03-22 RX ORDER — DULOXETIN HYDROCHLORIDE 30 MG/1
CAPSULE, DELAYED RELEASE ORAL
Qty: 30 CAPSULE | Refills: 0 | Status: SHIPPED | OUTPATIENT
Start: 2018-03-22 | End: 2018-04-19 | Stop reason: SDUPTHER

## 2018-03-22 RX ORDER — OXYCODONE AND ACETAMINOPHEN 7.5; 325 MG/1; MG/1
1 TABLET ORAL EVERY 6 HOURS PRN
Qty: 112 TABLET | Refills: 0 | Status: SHIPPED | OUTPATIENT
Start: 2018-03-22 | End: 2018-04-19 | Stop reason: SDUPTHER

## 2018-03-22 NOTE — PROGRESS NOTES
independently. Ability to do household chores indoor and/or outdoor work and social and leisure activities. Improve psychosocial and physical functioning. - she is showing progression towards this treatment goal with the current regimen. She was advised against drinking alcohol with the narcotic pain medicines, advised against driving or handling machinery while adjusting the dose of medicines or if having cognitive  issues related to the current medications. Risk of overdose and death, if medicines not taken as prescribed, were also discussed. If the patient develops new symptoms or if the symptoms worsen, the patient should call the office. While transcribing every attempt was made to maintain the accuracy of the note in terms of it's contents,there may have been some errors made inadvertently. Thank you for allowing me to participate in the care of this patient. Martine Veloz MD.    Cc: Luis Miguel Bocanegra CNP    I, Zenaida Mejia, scribing for in the presence  of Dr. Martine Veloz.   03/22/18  10:12 AM  Norman Stapleton Assistant  I, Dr. Martine Veloz, personally performed the services described in this documentation as scribed by  Zenaida Mejia MA in my presence and it is both accurate and complete

## 2018-04-19 ENCOUNTER — OFFICE VISIT (OUTPATIENT)
Dept: PAIN MANAGEMENT | Age: 73
End: 2018-04-19

## 2018-04-19 VITALS
BODY MASS INDEX: 26.61 KG/M2 | SYSTOLIC BLOOD PRESSURE: 139 MMHG | DIASTOLIC BLOOD PRESSURE: 77 MMHG | HEART RATE: 67 BPM | WEIGHT: 155 LBS

## 2018-04-19 DIAGNOSIS — M79.7 FIBROMYALGIA: ICD-10-CM

## 2018-04-19 DIAGNOSIS — M51.36 DDD (DEGENERATIVE DISC DISEASE), LUMBAR: ICD-10-CM

## 2018-04-19 DIAGNOSIS — M47.26 OSTEOARTHRITIS OF SPINE WITH RADICULOPATHY, LUMBAR REGION: ICD-10-CM

## 2018-04-19 DIAGNOSIS — G89.4 CHRONIC PAIN SYNDROME: ICD-10-CM

## 2018-04-19 DIAGNOSIS — M47.816 LUMBAR SPONDYLOSIS: ICD-10-CM

## 2018-04-19 PROCEDURE — G8399 PT W/DXA RESULTS DOCUMENT: HCPCS | Performed by: INTERNAL MEDICINE

## 2018-04-19 PROCEDURE — 99213 OFFICE O/P EST LOW 20 MIN: CPT | Performed by: INTERNAL MEDICINE

## 2018-04-19 PROCEDURE — G8417 CALC BMI ABV UP PARAM F/U: HCPCS | Performed by: INTERNAL MEDICINE

## 2018-04-19 PROCEDURE — G8427 DOCREV CUR MEDS BY ELIG CLIN: HCPCS | Performed by: INTERNAL MEDICINE

## 2018-04-19 PROCEDURE — 3017F COLORECTAL CA SCREEN DOC REV: CPT | Performed by: INTERNAL MEDICINE

## 2018-04-19 PROCEDURE — 4004F PT TOBACCO SCREEN RCVD TLK: CPT | Performed by: INTERNAL MEDICINE

## 2018-04-19 PROCEDURE — 1123F ACP DISCUSS/DSCN MKR DOCD: CPT | Performed by: INTERNAL MEDICINE

## 2018-04-19 PROCEDURE — 4040F PNEUMOC VAC/ADMIN/RCVD: CPT | Performed by: INTERNAL MEDICINE

## 2018-04-19 PROCEDURE — 1090F PRES/ABSN URINE INCON ASSESS: CPT | Performed by: INTERNAL MEDICINE

## 2018-04-19 RX ORDER — GABAPENTIN 600 MG/1
600 TABLET ORAL 3 TIMES DAILY
Qty: 90 TABLET | Refills: 0 | Status: SHIPPED | OUTPATIENT
Start: 2018-04-19 | End: 2018-05-18 | Stop reason: SDUPTHER

## 2018-04-19 RX ORDER — MELOXICAM 15 MG/1
TABLET ORAL
Qty: 30 TABLET | Refills: 0 | Status: SHIPPED | OUTPATIENT
Start: 2018-04-19 | End: 2018-05-18 | Stop reason: SDUPTHER

## 2018-04-19 RX ORDER — DULOXETIN HYDROCHLORIDE 30 MG/1
CAPSULE, DELAYED RELEASE ORAL
Qty: 30 CAPSULE | Refills: 0 | Status: SHIPPED | OUTPATIENT
Start: 2018-04-19 | End: 2018-05-18 | Stop reason: SDUPTHER

## 2018-04-19 RX ORDER — OXYCODONE AND ACETAMINOPHEN 7.5; 325 MG/1; MG/1
1 TABLET ORAL EVERY 6 HOURS PRN
Qty: 116 TABLET | Refills: 0 | Status: SHIPPED | OUTPATIENT
Start: 2018-04-19 | End: 2018-05-18 | Stop reason: SDUPTHER

## 2018-04-19 RX ORDER — DULOXETIN HYDROCHLORIDE 60 MG/1
CAPSULE, DELAYED RELEASE ORAL
Qty: 30 CAPSULE | Refills: 0 | Status: SHIPPED | OUTPATIENT
Start: 2018-04-19 | End: 2018-05-18 | Stop reason: SDUPTHER

## 2018-04-19 RX ORDER — CALCIUM CARBONATE/VITAMIN D3 500-10/5ML
LIQUID (ML) ORAL
Qty: 60 CAPSULE | Refills: 0 | Status: SHIPPED | OUTPATIENT
Start: 2018-04-19 | End: 2018-05-18 | Stop reason: SDUPTHER

## 2018-04-19 RX ORDER — AMITRIPTYLINE HYDROCHLORIDE 25 MG/1
TABLET, FILM COATED ORAL
Qty: 30 TABLET | Refills: 0 | Status: SHIPPED | OUTPATIENT
Start: 2018-04-19 | End: 2018-05-18 | Stop reason: SDUPTHER

## 2018-04-19 RX ORDER — TIZANIDINE 4 MG/1
TABLET ORAL
Qty: 60 TABLET | Refills: 0 | Status: SHIPPED | OUTPATIENT
Start: 2018-04-19 | End: 2018-05-18 | Stop reason: SDUPTHER

## 2018-05-02 ENCOUNTER — OFFICE VISIT (OUTPATIENT)
Dept: PULMONOLOGY | Age: 73
End: 2018-05-02

## 2018-05-02 VITALS
HEART RATE: 78 BPM | RESPIRATION RATE: 16 BRPM | HEIGHT: 64 IN | BODY MASS INDEX: 25.61 KG/M2 | DIASTOLIC BLOOD PRESSURE: 64 MMHG | SYSTOLIC BLOOD PRESSURE: 130 MMHG | TEMPERATURE: 98.1 F | WEIGHT: 150 LBS | OXYGEN SATURATION: 96 %

## 2018-05-02 DIAGNOSIS — F17.219 NICOTINE DEPENDENCE, CIGARETTES, WITH UNSPECIFIED NICOTINE-INDUCED DISORDERS: ICD-10-CM

## 2018-05-02 DIAGNOSIS — J44.9 COPD, SEVERE (HCC): Primary | ICD-10-CM

## 2018-05-02 PROBLEM — M51.379 DEGENERATION OF LUMBAR OR LUMBOSACRAL INTERVERTEBRAL DISC: Status: ACTIVE | Noted: 2018-05-02

## 2018-05-02 PROBLEM — M51.37 DEGENERATION OF LUMBAR OR LUMBOSACRAL INTERVERTEBRAL DISC: Status: ACTIVE | Noted: 2018-05-02

## 2018-05-02 PROCEDURE — 1090F PRES/ABSN URINE INCON ASSESS: CPT | Performed by: INTERNAL MEDICINE

## 2018-05-02 PROCEDURE — G8427 DOCREV CUR MEDS BY ELIG CLIN: HCPCS | Performed by: INTERNAL MEDICINE

## 2018-05-02 PROCEDURE — 3023F SPIROM DOC REV: CPT | Performed by: INTERNAL MEDICINE

## 2018-05-02 PROCEDURE — 3017F COLORECTAL CA SCREEN DOC REV: CPT | Performed by: INTERNAL MEDICINE

## 2018-05-02 PROCEDURE — G8926 SPIRO NO PERF OR DOC: HCPCS | Performed by: INTERNAL MEDICINE

## 2018-05-02 PROCEDURE — 1123F ACP DISCUSS/DSCN MKR DOCD: CPT | Performed by: INTERNAL MEDICINE

## 2018-05-02 PROCEDURE — G0296 VISIT TO DETERM LDCT ELIG: HCPCS | Performed by: INTERNAL MEDICINE

## 2018-05-02 PROCEDURE — G8399 PT W/DXA RESULTS DOCUMENT: HCPCS | Performed by: INTERNAL MEDICINE

## 2018-05-02 PROCEDURE — 4040F PNEUMOC VAC/ADMIN/RCVD: CPT | Performed by: INTERNAL MEDICINE

## 2018-05-02 PROCEDURE — G8417 CALC BMI ABV UP PARAM F/U: HCPCS | Performed by: INTERNAL MEDICINE

## 2018-05-02 PROCEDURE — 4004F PT TOBACCO SCREEN RCVD TLK: CPT | Performed by: INTERNAL MEDICINE

## 2018-05-02 PROCEDURE — 99213 OFFICE O/P EST LOW 20 MIN: CPT | Performed by: INTERNAL MEDICINE

## 2018-05-02 RX ORDER — FLUTICASONE FUROATE AND VILANTEROL 200; 25 UG/1; UG/1
POWDER RESPIRATORY (INHALATION)
Qty: 1 EACH | Refills: 5 | Status: SHIPPED | OUTPATIENT
Start: 2018-05-02 | End: 2019-01-01 | Stop reason: SDUPTHER

## 2018-05-02 RX ORDER — ALBUTEROL SULFATE 90 UG/1
2 AEROSOL, METERED RESPIRATORY (INHALATION) EVERY 6 HOURS PRN
Qty: 1 INHALER | Refills: 5 | Status: SHIPPED | OUTPATIENT
Start: 2018-05-02 | End: 2020-01-10 | Stop reason: SDUPTHER

## 2018-05-03 ENCOUNTER — OFFICE VISIT (OUTPATIENT)
Dept: FAMILY MEDICINE CLINIC | Age: 73
End: 2018-05-03

## 2018-05-03 VITALS
RESPIRATION RATE: 12 BRPM | SYSTOLIC BLOOD PRESSURE: 120 MMHG | HEART RATE: 72 BPM | DIASTOLIC BLOOD PRESSURE: 70 MMHG | WEIGHT: 149 LBS | BODY MASS INDEX: 25.58 KG/M2

## 2018-05-03 DIAGNOSIS — F51.01 PRIMARY INSOMNIA: ICD-10-CM

## 2018-05-03 DIAGNOSIS — E55.9 VITAMIN D DEFICIENCY: ICD-10-CM

## 2018-05-03 DIAGNOSIS — J44.9 COPD, SEVERE (HCC): ICD-10-CM

## 2018-05-03 DIAGNOSIS — K21.9 GASTROESOPHAGEAL REFLUX DISEASE WITHOUT ESOPHAGITIS: ICD-10-CM

## 2018-05-03 DIAGNOSIS — Z72.0 TOBACCO ABUSE: ICD-10-CM

## 2018-05-03 DIAGNOSIS — R73.03 PREDIABETES: ICD-10-CM

## 2018-05-03 DIAGNOSIS — M85.89 OSTEOPENIA OF MULTIPLE SITES: ICD-10-CM

## 2018-05-03 DIAGNOSIS — E78.00 HYPERCHOLESTEREMIA: Primary | ICD-10-CM

## 2018-05-03 PROCEDURE — G8427 DOCREV CUR MEDS BY ELIG CLIN: HCPCS | Performed by: NURSE PRACTITIONER

## 2018-05-03 PROCEDURE — 1090F PRES/ABSN URINE INCON ASSESS: CPT | Performed by: NURSE PRACTITIONER

## 2018-05-03 PROCEDURE — 4040F PNEUMOC VAC/ADMIN/RCVD: CPT | Performed by: NURSE PRACTITIONER

## 2018-05-03 PROCEDURE — 3017F COLORECTAL CA SCREEN DOC REV: CPT | Performed by: NURSE PRACTITIONER

## 2018-05-03 PROCEDURE — G8399 PT W/DXA RESULTS DOCUMENT: HCPCS | Performed by: NURSE PRACTITIONER

## 2018-05-03 PROCEDURE — 1123F ACP DISCUSS/DSCN MKR DOCD: CPT | Performed by: NURSE PRACTITIONER

## 2018-05-03 PROCEDURE — G8417 CALC BMI ABV UP PARAM F/U: HCPCS | Performed by: NURSE PRACTITIONER

## 2018-05-03 PROCEDURE — 99214 OFFICE O/P EST MOD 30 MIN: CPT | Performed by: NURSE PRACTITIONER

## 2018-05-03 PROCEDURE — 3023F SPIROM DOC REV: CPT | Performed by: NURSE PRACTITIONER

## 2018-05-03 PROCEDURE — G8926 SPIRO NO PERF OR DOC: HCPCS | Performed by: NURSE PRACTITIONER

## 2018-05-03 PROCEDURE — 4004F PT TOBACCO SCREEN RCVD TLK: CPT | Performed by: NURSE PRACTITIONER

## 2018-05-03 RX ORDER — FLUTICASONE FUROATE AND VILANTEROL 200; 25 UG/1; UG/1
1 POWDER RESPIRATORY (INHALATION) DAILY
Qty: 1 EACH | Refills: 0 | COMMUNITY
Start: 2018-05-03 | End: 2018-11-06 | Stop reason: SDUPTHER

## 2018-05-03 ASSESSMENT — ENCOUNTER SYMPTOMS
CONSTIPATION: 0
NAUSEA: 0
SHORTNESS OF BREATH: 1
VOMITING: 0
CHEST TIGHTNESS: 0
DIARRHEA: 0

## 2018-05-11 ENCOUNTER — HOSPITAL ENCOUNTER (OUTPATIENT)
Dept: CT IMAGING | Age: 73
Discharge: HOME | End: 2018-05-11
Attending: INTERNAL MEDICINE | Admitting: INTERNAL MEDICINE

## 2018-05-11 DIAGNOSIS — E55.9 VITAMIN D DEFICIENCY: ICD-10-CM

## 2018-05-11 DIAGNOSIS — F17.219 NICOTINE DEPENDENCE, CIGARETTES, WITH UNSPECIFIED NICOTINE-INDUCED DISORDERS: ICD-10-CM

## 2018-05-11 DIAGNOSIS — R73.03 PREDIABETES: ICD-10-CM

## 2018-05-11 DIAGNOSIS — E78.00 HYPERCHOLESTEREMIA: ICD-10-CM

## 2018-05-11 DIAGNOSIS — F17.219 CIGARETTE NICOTINE DEPENDENCE WITH NICOTINE-INDUCED DISORDER: ICD-10-CM

## 2018-05-11 LAB
A/G RATIO: 1.8 (ref 1.1–2.2)
ALBUMIN SERPL-MCNC: 4.3 G/DL (ref 3.4–5)
ALP BLD-CCNC: 103 U/L (ref 40–129)
ALT SERPL-CCNC: 14 U/L (ref 10–40)
ANION GAP SERPL CALCULATED.3IONS-SCNC: 16 MMOL/L (ref 3–16)
AST SERPL-CCNC: 16 U/L (ref 15–37)
BILIRUB SERPL-MCNC: 0.4 MG/DL (ref 0–1)
BUN BLDV-MCNC: 15 MG/DL (ref 7–20)
CALCIUM SERPL-MCNC: 9 MG/DL (ref 8.3–10.6)
CHLORIDE BLD-SCNC: 102 MMOL/L (ref 99–110)
CHOLESTEROL, TOTAL: 174 MG/DL (ref 0–199)
CO2: 25 MMOL/L (ref 21–32)
CREAT SERPL-MCNC: 0.8 MG/DL (ref 0.6–1.2)
GFR AFRICAN AMERICAN: >60
GFR NON-AFRICAN AMERICAN: >60
GLOBULIN: 2.4 G/DL
GLUCOSE BLD-MCNC: 98 MG/DL (ref 70–99)
HDLC SERPL-MCNC: 56 MG/DL (ref 40–60)
LDL CHOLESTEROL CALCULATED: 93 MG/DL
POTASSIUM SERPL-SCNC: 5 MMOL/L (ref 3.5–5.1)
SODIUM BLD-SCNC: 143 MMOL/L (ref 136–145)
TOTAL PROTEIN: 6.7 G/DL (ref 6.4–8.2)
TRIGL SERPL-MCNC: 124 MG/DL (ref 0–150)
VITAMIN D 25-HYDROXY: 35.2 NG/ML
VLDLC SERPL CALC-MCNC: 25 MG/DL

## 2018-05-12 LAB
ESTIMATED AVERAGE GLUCOSE: 137 MG/DL
HBA1C MFR BLD: 6.4 %

## 2018-05-14 ENCOUNTER — CASE MANAGEMENT (OUTPATIENT)
Dept: CASE MANAGEMENT | Age: 73
End: 2018-05-14

## 2018-05-18 ENCOUNTER — OFFICE VISIT (OUTPATIENT)
Dept: PAIN MANAGEMENT | Age: 73
End: 2018-05-18

## 2018-05-18 VITALS
DIASTOLIC BLOOD PRESSURE: 74 MMHG | HEART RATE: 78 BPM | WEIGHT: 149.6 LBS | SYSTOLIC BLOOD PRESSURE: 130 MMHG | BODY MASS INDEX: 25.68 KG/M2

## 2018-05-18 DIAGNOSIS — M51.36 DDD (DEGENERATIVE DISC DISEASE), LUMBAR: ICD-10-CM

## 2018-05-18 DIAGNOSIS — M47.26 OSTEOARTHRITIS OF SPINE WITH RADICULOPATHY, LUMBAR REGION: ICD-10-CM

## 2018-05-18 DIAGNOSIS — M51.37 DEGENERATION OF LUMBAR OR LUMBOSACRAL INTERVERTEBRAL DISC: ICD-10-CM

## 2018-05-18 DIAGNOSIS — M47.816 LUMBAR SPONDYLOSIS: ICD-10-CM

## 2018-05-18 DIAGNOSIS — G89.4 CHRONIC PAIN SYNDROME: ICD-10-CM

## 2018-05-18 DIAGNOSIS — M79.7 FIBROMYALGIA: ICD-10-CM

## 2018-05-18 PROCEDURE — 4004F PT TOBACCO SCREEN RCVD TLK: CPT | Performed by: INTERNAL MEDICINE

## 2018-05-18 PROCEDURE — G8427 DOCREV CUR MEDS BY ELIG CLIN: HCPCS | Performed by: INTERNAL MEDICINE

## 2018-05-18 PROCEDURE — 99213 OFFICE O/P EST LOW 20 MIN: CPT | Performed by: INTERNAL MEDICINE

## 2018-05-18 PROCEDURE — 3017F COLORECTAL CA SCREEN DOC REV: CPT | Performed by: INTERNAL MEDICINE

## 2018-05-18 PROCEDURE — 1090F PRES/ABSN URINE INCON ASSESS: CPT | Performed by: INTERNAL MEDICINE

## 2018-05-18 PROCEDURE — G8399 PT W/DXA RESULTS DOCUMENT: HCPCS | Performed by: INTERNAL MEDICINE

## 2018-05-18 PROCEDURE — G8417 CALC BMI ABV UP PARAM F/U: HCPCS | Performed by: INTERNAL MEDICINE

## 2018-05-18 PROCEDURE — 4040F PNEUMOC VAC/ADMIN/RCVD: CPT | Performed by: INTERNAL MEDICINE

## 2018-05-18 PROCEDURE — 1123F ACP DISCUSS/DSCN MKR DOCD: CPT | Performed by: INTERNAL MEDICINE

## 2018-05-18 RX ORDER — CALCIUM CARBONATE/VITAMIN D3 500-10/5ML
LIQUID (ML) ORAL
Qty: 60 CAPSULE | Refills: 0 | Status: SHIPPED | OUTPATIENT
Start: 2018-05-18 | End: 2018-06-12 | Stop reason: SDUPTHER

## 2018-05-18 RX ORDER — DULOXETIN HYDROCHLORIDE 60 MG/1
CAPSULE, DELAYED RELEASE ORAL
Qty: 30 CAPSULE | Refills: 0 | Status: SHIPPED | OUTPATIENT
Start: 2018-05-18 | End: 2018-06-12 | Stop reason: SDUPTHER

## 2018-05-18 RX ORDER — DULOXETIN HYDROCHLORIDE 30 MG/1
CAPSULE, DELAYED RELEASE ORAL
Qty: 30 CAPSULE | Refills: 0 | Status: SHIPPED | OUTPATIENT
Start: 2018-05-18 | End: 2018-06-12 | Stop reason: SDUPTHER

## 2018-05-18 RX ORDER — MELOXICAM 15 MG/1
TABLET ORAL
Qty: 30 TABLET | Refills: 0 | Status: SHIPPED | OUTPATIENT
Start: 2018-05-18 | End: 2018-06-12 | Stop reason: SDUPTHER

## 2018-05-18 RX ORDER — AMITRIPTYLINE HYDROCHLORIDE 25 MG/1
TABLET, FILM COATED ORAL
Qty: 30 TABLET | Refills: 0 | Status: SHIPPED | OUTPATIENT
Start: 2018-05-18 | End: 2018-06-12 | Stop reason: SDUPTHER

## 2018-05-18 RX ORDER — GABAPENTIN 600 MG/1
600 TABLET ORAL 3 TIMES DAILY
Qty: 90 TABLET | Refills: 0 | Status: SHIPPED | OUTPATIENT
Start: 2018-05-18 | End: 2018-06-12 | Stop reason: SDUPTHER

## 2018-05-18 RX ORDER — TIZANIDINE 4 MG/1
TABLET ORAL
Qty: 60 TABLET | Refills: 0 | Status: SHIPPED | OUTPATIENT
Start: 2018-05-18 | End: 2018-06-12 | Stop reason: SDUPTHER

## 2018-05-18 RX ORDER — OXYCODONE AND ACETAMINOPHEN 7.5; 325 MG/1; MG/1
1 TABLET ORAL EVERY 6 HOURS PRN
Qty: 112 TABLET | Refills: 0 | Status: SHIPPED | OUTPATIENT
Start: 2018-05-18 | End: 2018-06-12 | Stop reason: SDUPTHER

## 2018-06-12 ENCOUNTER — OFFICE VISIT (OUTPATIENT)
Dept: PAIN MANAGEMENT | Age: 73
End: 2018-06-12

## 2018-06-12 VITALS
SYSTOLIC BLOOD PRESSURE: 162 MMHG | BODY MASS INDEX: 25.4 KG/M2 | DIASTOLIC BLOOD PRESSURE: 82 MMHG | WEIGHT: 148 LBS | HEART RATE: 59 BPM

## 2018-06-12 DIAGNOSIS — G89.4 CHRONIC PAIN SYNDROME: ICD-10-CM

## 2018-06-12 DIAGNOSIS — M51.36 DDD (DEGENERATIVE DISC DISEASE), LUMBAR: ICD-10-CM

## 2018-06-12 DIAGNOSIS — M47.816 LUMBAR SPONDYLOSIS: ICD-10-CM

## 2018-06-12 DIAGNOSIS — M47.26 OSTEOARTHRITIS OF SPINE WITH RADICULOPATHY, LUMBAR REGION: ICD-10-CM

## 2018-06-12 DIAGNOSIS — M51.37 DEGENERATION OF LUMBAR OR LUMBOSACRAL INTERVERTEBRAL DISC: ICD-10-CM

## 2018-06-12 DIAGNOSIS — M79.7 FIBROMYALGIA: ICD-10-CM

## 2018-06-12 PROCEDURE — 4004F PT TOBACCO SCREEN RCVD TLK: CPT | Performed by: INTERNAL MEDICINE

## 2018-06-12 PROCEDURE — 99213 OFFICE O/P EST LOW 20 MIN: CPT | Performed by: INTERNAL MEDICINE

## 2018-06-12 PROCEDURE — 3017F COLORECTAL CA SCREEN DOC REV: CPT | Performed by: INTERNAL MEDICINE

## 2018-06-12 PROCEDURE — G8417 CALC BMI ABV UP PARAM F/U: HCPCS | Performed by: INTERNAL MEDICINE

## 2018-06-12 PROCEDURE — 1123F ACP DISCUSS/DSCN MKR DOCD: CPT | Performed by: INTERNAL MEDICINE

## 2018-06-12 PROCEDURE — G8427 DOCREV CUR MEDS BY ELIG CLIN: HCPCS | Performed by: INTERNAL MEDICINE

## 2018-06-12 PROCEDURE — 1090F PRES/ABSN URINE INCON ASSESS: CPT | Performed by: INTERNAL MEDICINE

## 2018-06-12 PROCEDURE — G8399 PT W/DXA RESULTS DOCUMENT: HCPCS | Performed by: INTERNAL MEDICINE

## 2018-06-12 PROCEDURE — 4040F PNEUMOC VAC/ADMIN/RCVD: CPT | Performed by: INTERNAL MEDICINE

## 2018-06-12 RX ORDER — DULOXETIN HYDROCHLORIDE 30 MG/1
CAPSULE, DELAYED RELEASE ORAL
Qty: 30 CAPSULE | Refills: 0 | Status: SHIPPED | OUTPATIENT
Start: 2018-06-12 | End: 2018-07-13 | Stop reason: SDUPTHER

## 2018-06-12 RX ORDER — CALCIUM CARBONATE/VITAMIN D3 500-10/5ML
LIQUID (ML) ORAL
Qty: 60 CAPSULE | Refills: 0 | Status: SHIPPED | OUTPATIENT
Start: 2018-06-12 | End: 2018-07-13 | Stop reason: SDUPTHER

## 2018-06-12 RX ORDER — GABAPENTIN 600 MG/1
600 TABLET ORAL 3 TIMES DAILY
Qty: 90 TABLET | Refills: 0 | Status: SHIPPED | OUTPATIENT
Start: 2018-06-12 | End: 2018-07-13 | Stop reason: SDUPTHER

## 2018-06-12 RX ORDER — MELOXICAM 15 MG/1
TABLET ORAL
Qty: 30 TABLET | Refills: 0 | Status: SHIPPED | OUTPATIENT
Start: 2018-06-12 | End: 2018-09-10 | Stop reason: SDUPTHER

## 2018-06-12 RX ORDER — DULOXETIN HYDROCHLORIDE 60 MG/1
CAPSULE, DELAYED RELEASE ORAL
Qty: 30 CAPSULE | Refills: 0 | Status: SHIPPED | OUTPATIENT
Start: 2018-06-12 | End: 2018-07-13 | Stop reason: SDUPTHER

## 2018-06-12 RX ORDER — TIZANIDINE 4 MG/1
TABLET ORAL
Qty: 60 TABLET | Refills: 0 | Status: SHIPPED | OUTPATIENT
Start: 2018-06-12 | End: 2018-07-13 | Stop reason: SDUPTHER

## 2018-06-12 RX ORDER — AMITRIPTYLINE HYDROCHLORIDE 25 MG/1
TABLET, FILM COATED ORAL
Qty: 30 TABLET | Refills: 0 | Status: SHIPPED | OUTPATIENT
Start: 2018-06-12 | End: 2018-07-13 | Stop reason: SDUPTHER

## 2018-06-12 RX ORDER — OXYCODONE AND ACETAMINOPHEN 7.5; 325 MG/1; MG/1
1 TABLET ORAL EVERY 6 HOURS PRN
Qty: 120 TABLET | Refills: 0 | Status: SHIPPED | OUTPATIENT
Start: 2018-06-12 | End: 2018-07-13 | Stop reason: SDUPTHER

## 2018-07-13 ENCOUNTER — OFFICE VISIT (OUTPATIENT)
Dept: PAIN MANAGEMENT | Age: 73
End: 2018-07-13

## 2018-07-13 VITALS
HEART RATE: 74 BPM | SYSTOLIC BLOOD PRESSURE: 89 MMHG | WEIGHT: 146 LBS | DIASTOLIC BLOOD PRESSURE: 63 MMHG | BODY MASS INDEX: 25.06 KG/M2

## 2018-07-13 DIAGNOSIS — F33.1 MODERATE EPISODE OF RECURRENT MAJOR DEPRESSIVE DISORDER (HCC): ICD-10-CM

## 2018-07-13 DIAGNOSIS — M47.26 OSTEOARTHRITIS OF SPINE WITH RADICULOPATHY, LUMBAR REGION: ICD-10-CM

## 2018-07-13 DIAGNOSIS — K59.03 DRUG-INDUCED CONSTIPATION: ICD-10-CM

## 2018-07-13 DIAGNOSIS — F51.01 PRIMARY INSOMNIA: ICD-10-CM

## 2018-07-13 DIAGNOSIS — M51.36 DDD (DEGENERATIVE DISC DISEASE), LUMBAR: ICD-10-CM

## 2018-07-13 DIAGNOSIS — G89.4 CHRONIC PAIN SYNDROME: ICD-10-CM

## 2018-07-13 DIAGNOSIS — M47.816 LUMBAR SPONDYLOSIS: ICD-10-CM

## 2018-07-13 DIAGNOSIS — M51.37 DEGENERATION OF LUMBAR OR LUMBOSACRAL INTERVERTEBRAL DISC: ICD-10-CM

## 2018-07-13 DIAGNOSIS — M79.7 FIBROMYALGIA: ICD-10-CM

## 2018-07-13 PROCEDURE — 1123F ACP DISCUSS/DSCN MKR DOCD: CPT | Performed by: INTERNAL MEDICINE

## 2018-07-13 PROCEDURE — 4004F PT TOBACCO SCREEN RCVD TLK: CPT | Performed by: INTERNAL MEDICINE

## 2018-07-13 PROCEDURE — 4040F PNEUMOC VAC/ADMIN/RCVD: CPT | Performed by: INTERNAL MEDICINE

## 2018-07-13 PROCEDURE — G8427 DOCREV CUR MEDS BY ELIG CLIN: HCPCS | Performed by: INTERNAL MEDICINE

## 2018-07-13 PROCEDURE — 99214 OFFICE O/P EST MOD 30 MIN: CPT | Performed by: INTERNAL MEDICINE

## 2018-07-13 PROCEDURE — G8399 PT W/DXA RESULTS DOCUMENT: HCPCS | Performed by: INTERNAL MEDICINE

## 2018-07-13 PROCEDURE — 1101F PT FALLS ASSESS-DOCD LE1/YR: CPT | Performed by: INTERNAL MEDICINE

## 2018-07-13 PROCEDURE — 1090F PRES/ABSN URINE INCON ASSESS: CPT | Performed by: INTERNAL MEDICINE

## 2018-07-13 PROCEDURE — G8417 CALC BMI ABV UP PARAM F/U: HCPCS | Performed by: INTERNAL MEDICINE

## 2018-07-13 PROCEDURE — 3017F COLORECTAL CA SCREEN DOC REV: CPT | Performed by: INTERNAL MEDICINE

## 2018-07-13 RX ORDER — AMITRIPTYLINE HYDROCHLORIDE 25 MG/1
TABLET, FILM COATED ORAL
Qty: 30 TABLET | Refills: 0 | Status: SHIPPED | OUTPATIENT
Start: 2018-07-13 | End: 2018-08-13 | Stop reason: SDUPTHER

## 2018-07-13 RX ORDER — GABAPENTIN 600 MG/1
600 TABLET ORAL 3 TIMES DAILY
Qty: 90 TABLET | Refills: 0 | Status: SHIPPED | OUTPATIENT
Start: 2018-07-13 | End: 2018-08-13 | Stop reason: SDUPTHER

## 2018-07-13 RX ORDER — DULOXETIN HYDROCHLORIDE 60 MG/1
CAPSULE, DELAYED RELEASE ORAL
Qty: 30 CAPSULE | Refills: 0 | Status: SHIPPED | OUTPATIENT
Start: 2018-07-13 | End: 2018-08-13 | Stop reason: SDUPTHER

## 2018-07-13 RX ORDER — DULOXETIN HYDROCHLORIDE 30 MG/1
CAPSULE, DELAYED RELEASE ORAL
Qty: 30 CAPSULE | Refills: 0 | Status: SHIPPED | OUTPATIENT
Start: 2018-07-13 | End: 2018-08-13 | Stop reason: SDUPTHER

## 2018-07-13 RX ORDER — CALCIUM CARBONATE/VITAMIN D3 500-10/5ML
LIQUID (ML) ORAL
Qty: 60 CAPSULE | Refills: 0 | Status: SHIPPED | OUTPATIENT
Start: 2018-07-13 | End: 2018-08-13 | Stop reason: SDUPTHER

## 2018-07-13 RX ORDER — METHYLPREDNISOLONE 4 MG/1
4 TABLET ORAL SEE ADMIN INSTRUCTIONS
Qty: 1 KIT | Refills: 0 | Status: SHIPPED | OUTPATIENT
Start: 2018-07-13 | End: 2018-07-19

## 2018-07-13 RX ORDER — TIZANIDINE 4 MG/1
TABLET ORAL
Qty: 60 TABLET | Refills: 0 | Status: SHIPPED | OUTPATIENT
Start: 2018-07-13 | End: 2018-08-13 | Stop reason: SDUPTHER

## 2018-07-13 RX ORDER — OXYCODONE AND ACETAMINOPHEN 7.5; 325 MG/1; MG/1
1 TABLET ORAL EVERY 6 HOURS PRN
Qty: 120 TABLET | Refills: 0 | Status: SHIPPED | OUTPATIENT
Start: 2018-07-13 | End: 2018-08-13 | Stop reason: SDUPTHER

## 2018-07-13 NOTE — PROGRESS NOTES
the am 30 capsule 0    DULoxetine (CYMBALTA) 60 MG extended release capsule Take one tablet at night 30 capsule 0    Magnesium Oxide 400 MG CAPS Take 1 tablet by mouth BID 60 capsule 0    amitriptyline (ELAVIL) 25 MG tablet TAKE 1 TABLET BY MOUTH NIGHTLY 30 tablet 0    meloxicam (MOBIC) 15 MG tablet TAKE 1 TABLET BY MOUTH DAILY 30 tablet 0    Fluticasone Furoate-Vilanterol (BREO ELLIPTA) 200-25 MCG/INH AEPB Inhale 1 puff into the lungs daily 1 each 0    albuterol sulfate  (90 Base) MCG/ACT inhaler Inhale 2 puffs into the lungs every 6 hours as needed for Shortness of Breath 1 Inhaler 5    Fluticasone Furoate-Vilanterol (BREO ELLIPTA) 200-25 MCG/INH AEPB INHALE 1 PUFF INTO THE LUNGS DAILY 1 each 5    vitamin D (ERGOCALCIFEROL) 44213 units CAPS capsule TAKE 1 CAPSULE BY MOUTH TWICE WEEKLY 24 capsule 1    calcium carbonate 600 MG TABS tablet Take 1 tablet by mouth 2 times daily      gabapentin (NEURONTIN) 600 MG tablet Take 1 tablet by mouth 3 times daily for 30 days. . 90 tablet 0     No facility-administered medications prior to visit. REVIEW OF SYSTEMS:   Constitutional: Negative for fatigue and unexpected weight change. Eyes: Negative for visual disturbance. Respiratory: Negative for shortness of breath. Cardiovascular: Negative for chest pain, palpitations  Gastrointestinal: Negative for blood in stool, abdominal distention, nausea, vomiting, abdominal pain, diarrhea,constipation. Skin: Negative for color change or any abnormal bruising. Neurological: Negative for speech difficulty, weakness and light-headedness, dizziness, tremors, sleepiness  Psychiatric/Behavioral: Negative for suicidal ideas, hallucinations, behavioral problems, self-injury, decreased concentration/cognition, agitation, confusion. PHYSICAL EXAM:   Nursing note and vitals reviewed. BP 89/63   Pulse 74   Wt 146 lb (66.2 kg)   Breastfeeding?  No   BMI 25.06 kg/m²   General Appearance: Patient is well have been/were  discussed with the patient. Any questions on the  common side effects of these medications were also answered. She was advised against drinking alcohol with the narcotic pain medicines, advised against driving or handling machinery when  starting or adjusting the dose of medicines, feeling groggy or drowsy, or if having any cognitive issues related to the current medications. Sheis fully aware of the risk of overdose and death, if medicines are misused and not taken as prescribed. If she develops new symptoms or if the symptoms worsen, she was told to call the office. Thank you for allowing me to participate in the care of this patient.      Irene Snowden MD.    Cc: HECTOR Gamble - CNP    I, Jv Hdez, am scribing for and in the presence of Dr. Irene Snowden.   07/13/18  9:56 AM  Eneida Caldwell MA   I, Dr. Irene Snowden, personally performed the services described in this documentation as scribed by   Jv Hdez MA in my presence and it is both accurate and complete

## 2018-08-13 ENCOUNTER — OFFICE VISIT (OUTPATIENT)
Dept: PAIN MANAGEMENT | Age: 73
End: 2018-08-13

## 2018-08-13 VITALS
RESPIRATION RATE: 14 BRPM | DIASTOLIC BLOOD PRESSURE: 73 MMHG | HEART RATE: 81 BPM | SYSTOLIC BLOOD PRESSURE: 119 MMHG | HEIGHT: 64 IN | BODY MASS INDEX: 25.61 KG/M2 | WEIGHT: 150 LBS

## 2018-08-13 DIAGNOSIS — M51.36 DDD (DEGENERATIVE DISC DISEASE), LUMBAR: ICD-10-CM

## 2018-08-13 DIAGNOSIS — R25.2 MUSCLE CRAMPING: ICD-10-CM

## 2018-08-13 DIAGNOSIS — M51.37 DEGENERATION OF LUMBAR OR LUMBOSACRAL INTERVERTEBRAL DISC: ICD-10-CM

## 2018-08-13 DIAGNOSIS — M47.26 OSTEOARTHRITIS OF SPINE WITH RADICULOPATHY, LUMBAR REGION: ICD-10-CM

## 2018-08-13 DIAGNOSIS — M70.61 TROCHANTERIC BURSITIS OF RIGHT HIP: ICD-10-CM

## 2018-08-13 DIAGNOSIS — M79.7 FIBROMYALGIA: ICD-10-CM

## 2018-08-13 DIAGNOSIS — M47.816 LUMBAR SPONDYLOSIS: ICD-10-CM

## 2018-08-13 DIAGNOSIS — G89.4 CHRONIC PAIN SYNDROME: ICD-10-CM

## 2018-08-13 PROCEDURE — 4040F PNEUMOC VAC/ADMIN/RCVD: CPT | Performed by: INTERNAL MEDICINE

## 2018-08-13 PROCEDURE — G8427 DOCREV CUR MEDS BY ELIG CLIN: HCPCS | Performed by: INTERNAL MEDICINE

## 2018-08-13 PROCEDURE — G8417 CALC BMI ABV UP PARAM F/U: HCPCS | Performed by: INTERNAL MEDICINE

## 2018-08-13 PROCEDURE — 1090F PRES/ABSN URINE INCON ASSESS: CPT | Performed by: INTERNAL MEDICINE

## 2018-08-13 PROCEDURE — 99213 OFFICE O/P EST LOW 20 MIN: CPT | Performed by: INTERNAL MEDICINE

## 2018-08-13 PROCEDURE — G8399 PT W/DXA RESULTS DOCUMENT: HCPCS | Performed by: INTERNAL MEDICINE

## 2018-08-13 PROCEDURE — 20610 DRAIN/INJ JOINT/BURSA W/O US: CPT | Performed by: INTERNAL MEDICINE

## 2018-08-13 PROCEDURE — 1123F ACP DISCUSS/DSCN MKR DOCD: CPT | Performed by: INTERNAL MEDICINE

## 2018-08-13 PROCEDURE — 4004F PT TOBACCO SCREEN RCVD TLK: CPT | Performed by: INTERNAL MEDICINE

## 2018-08-13 PROCEDURE — 1101F PT FALLS ASSESS-DOCD LE1/YR: CPT | Performed by: INTERNAL MEDICINE

## 2018-08-13 PROCEDURE — 3017F COLORECTAL CA SCREEN DOC REV: CPT | Performed by: INTERNAL MEDICINE

## 2018-08-13 RX ORDER — DULOXETIN HYDROCHLORIDE 30 MG/1
CAPSULE, DELAYED RELEASE ORAL
Qty: 30 CAPSULE | Refills: 1 | Status: SHIPPED | OUTPATIENT
Start: 2018-08-13 | End: 2018-09-10 | Stop reason: SDUPTHER

## 2018-08-13 RX ORDER — CALCIUM CARBONATE/VITAMIN D3 500-10/5ML
LIQUID (ML) ORAL
Qty: 60 CAPSULE | Refills: 1 | Status: SHIPPED | OUTPATIENT
Start: 2018-08-13 | End: 2018-09-10 | Stop reason: SDUPTHER

## 2018-08-13 RX ORDER — OXYCODONE AND ACETAMINOPHEN 7.5; 325 MG/1; MG/1
1 TABLET ORAL EVERY 6 HOURS PRN
Qty: 112 TABLET | Refills: 0 | Status: SHIPPED | OUTPATIENT
Start: 2018-08-13 | End: 2018-09-10 | Stop reason: SDUPTHER

## 2018-08-13 RX ORDER — TRIAMCINOLONE ACETONIDE 40 MG/ML
40 INJECTION, SUSPENSION INTRA-ARTICULAR; INTRAMUSCULAR ONCE
Status: COMPLETED | OUTPATIENT
Start: 2018-08-13 | End: 2018-08-13

## 2018-08-13 RX ORDER — GABAPENTIN 600 MG/1
600 TABLET ORAL 3 TIMES DAILY
Qty: 90 TABLET | Refills: 1 | Status: SHIPPED | OUTPATIENT
Start: 2018-08-13 | End: 2018-09-10 | Stop reason: SDUPTHER

## 2018-08-13 RX ORDER — DULOXETIN HYDROCHLORIDE 60 MG/1
CAPSULE, DELAYED RELEASE ORAL
Qty: 30 CAPSULE | Refills: 1 | Status: SHIPPED | OUTPATIENT
Start: 2018-08-13 | End: 2018-09-10 | Stop reason: SDUPTHER

## 2018-08-13 RX ORDER — TIZANIDINE 4 MG/1
TABLET ORAL
Qty: 60 TABLET | Refills: 1 | Status: SHIPPED | OUTPATIENT
Start: 2018-08-13 | End: 2018-09-10 | Stop reason: SDUPTHER

## 2018-08-13 RX ORDER — AMITRIPTYLINE HYDROCHLORIDE 25 MG/1
TABLET, FILM COATED ORAL
Qty: 30 TABLET | Refills: 1 | Status: SHIPPED | OUTPATIENT
Start: 2018-08-13 | End: 2018-09-10 | Stop reason: SDUPTHER

## 2018-08-13 RX ADMIN — TRIAMCINOLONE ACETONIDE 40 MG: 40 INJECTION, SUSPENSION INTRA-ARTICULAR; INTRAMUSCULAR at 14:12

## 2018-08-13 NOTE — PROGRESS NOTES
were reviewed. Her current medications are   Outpatient Medications Prior to Visit   Medication Sig Dispense Refill    tiZANidine (ZANAFLEX) 4 MG tablet Take one tablet po BID 60 tablet 0    DULoxetine (CYMBALTA) 30 MG extended release capsule Take 1 tablet po in the am 30 capsule 0    DULoxetine (CYMBALTA) 60 MG extended release capsule Take one tablet at night 30 capsule 0    Magnesium Oxide 400 MG CAPS Take 1 tablet by mouth BID 60 capsule 0    amitriptyline (ELAVIL) 25 MG tablet TAKE 1 TABLET BY MOUTH NIGHTLY 30 tablet 0    pantoprazole (PROTONIX) 20 MG tablet TAKE 1 TABLET BY MOUTH DAILY 30 tablet 5    atorvastatin (LIPITOR) 80 MG tablet TAKE 1 TABLET BY MOUTH EVERY DAY FOR CHOLESTEROL 30 tablet 5    meloxicam (MOBIC) 15 MG tablet TAKE 1 TABLET BY MOUTH DAILY 30 tablet 0    Fluticasone Furoate-Vilanterol (BREO ELLIPTA) 200-25 MCG/INH AEPB Inhale 1 puff into the lungs daily 1 each 0    albuterol sulfate  (90 Base) MCG/ACT inhaler Inhale 2 puffs into the lungs every 6 hours as needed for Shortness of Breath 1 Inhaler 5    Fluticasone Furoate-Vilanterol (BREO ELLIPTA) 200-25 MCG/INH AEPB INHALE 1 PUFF INTO THE LUNGS DAILY 1 each 5    vitamin D (ERGOCALCIFEROL) 51294 units CAPS capsule TAKE 1 CAPSULE BY MOUTH TWICE WEEKLY 24 capsule 1    calcium carbonate 600 MG TABS tablet Take 1 tablet by mouth 2 times daily      gabapentin (NEURONTIN) 600 MG tablet Take 1 tablet by mouth 3 times daily for 30 days. . 90 tablet 0     No facility-administered medications prior to visit. SOCIAL/FAMILY/PAST MEDICAL HISTORY: Ms. Marin Notice, family and past medical history was reviewed. REVIEW OF SYSTEMS:    Respiratory: Negative for apnea, chest tightness and shortness of breath or change in baseline breathing. Gastrointestinal: Negative for nausea, vomiting, abdominal pain, diarrhea, constipation, blood in stool and abdominal distention. PHYSICAL EXAM:   Nursing note and vitals reviewed.  BP 119/73   Pulse 81   Resp 14   Ht 5' 4\" (1.626 m)   Wt 150 lb (68 kg)   BMI 25.75 kg/m²   Constitutional: She appears well-developed and well-nourished. No acute distress. Skin: Skin is warm and dry, good turgor. No rash noted. She is not diaphoretic. Cardiovascular: Normal rate, regular rhythm, normal heart sounds, and does not have murmur. Pulmonary/Chest: Effort normal. No respiratory distress. She does not have wheezes in the lung fields. She has no rales. Decrease air exchange bilaterally   Neurological/Psychiatric:She is alert and oriented to person, place, and time. Coordination is  normal. Her mood isAppropriate and affect is Neutral/Euthymic(normal) . Other: Right hip + tenderness greater trochanter area      IMPRESSION:   1. Trochanteric bursitis of right hip    2. Chronic pain syndrome    3. Fibromyalgia    4. Degeneration of lumbar or lumbosacral intervertebral disc    5. Lumbar spondylosis    6. Muscle cramping    7. DDD (degenerative disc disease), lumbar    8. Osteoarthritis of spine with radiculopathy, lumbar region        PLAN:  Informed verbal consent was obtained   -Informed verbal consent was obtained from the patient. Risks and benefits of the procedure were explained. Complications of the procedure and side effects of kenalog/ lidocaine were discussed with patient. Using 0.25% marcaine and 1cc of kenalog=40mg the right hip was injected under aseptic conditions. Patient tolerated procedure well. Advised ice and rest.   -Hip/back stretching exercises given   -Continue with Percocet 4 per day   -Can using OTC Nsaids   -Advised patient to quit smoking for  health related concerns and to improve the treatment outcomes. Education was given on quitting smoking and the use of different modalities including medications, hypnotherapy, counselling  and biofeedback. These were discussed with patient.    Current Outpatient Prescriptions   Medication Sig Dispense Refill    tiZANidine (ZANAFLEX) 4 MG tablet Take one tablet po BID 60 tablet 0    DULoxetine (CYMBALTA) 30 MG extended release capsule Take 1 tablet po in the am 30 capsule 0    DULoxetine (CYMBALTA) 60 MG extended release capsule Take one tablet at night 30 capsule 0    Magnesium Oxide 400 MG CAPS Take 1 tablet by mouth BID 60 capsule 0    amitriptyline (ELAVIL) 25 MG tablet TAKE 1 TABLET BY MOUTH NIGHTLY 30 tablet 0    pantoprazole (PROTONIX) 20 MG tablet TAKE 1 TABLET BY MOUTH DAILY 30 tablet 5    atorvastatin (LIPITOR) 80 MG tablet TAKE 1 TABLET BY MOUTH EVERY DAY FOR CHOLESTEROL 30 tablet 5    meloxicam (MOBIC) 15 MG tablet TAKE 1 TABLET BY MOUTH DAILY 30 tablet 0    Fluticasone Furoate-Vilanterol (BREO ELLIPTA) 200-25 MCG/INH AEPB Inhale 1 puff into the lungs daily 1 each 0    albuterol sulfate  (90 Base) MCG/ACT inhaler Inhale 2 puffs into the lungs every 6 hours as needed for Shortness of Breath 1 Inhaler 5    Fluticasone Furoate-Vilanterol (BREO ELLIPTA) 200-25 MCG/INH AEPB INHALE 1 PUFF INTO THE LUNGS DAILY 1 each 5    vitamin D (ERGOCALCIFEROL) 18945 units CAPS capsule TAKE 1 CAPSULE BY MOUTH TWICE WEEKLY 24 capsule 1    calcium carbonate 600 MG TABS tablet Take 1 tablet by mouth 2 times daily      gabapentin (NEURONTIN) 600 MG tablet Take 1 tablet by mouth 3 times daily for 30 days. . 90 tablet 0     No current facility-administered medications for this visit. I will continue her current medication regimen  which is part of the above treatment schedule. It has been helping with Ms. Anastacia Savage chronic  medical problems which for this visit include:   Diagnoses of Chronic back pain, unspecified back location, unspecified back pain laterality, Chronic pain syndrome, Fibromyalgia, Degeneration of lumbar or lumbosacral intervertebral disc, Lumbar spondylosis, Drug-induced constipation, and Muscle cramping were pertinent to this visit.    Risks and benefits of the medications and other alternative treatments

## 2018-09-10 ENCOUNTER — OFFICE VISIT (OUTPATIENT)
Dept: PAIN MANAGEMENT | Age: 73
End: 2018-09-10

## 2018-09-10 VITALS
WEIGHT: 150 LBS | DIASTOLIC BLOOD PRESSURE: 79 MMHG | HEART RATE: 77 BPM | BODY MASS INDEX: 25.75 KG/M2 | SYSTOLIC BLOOD PRESSURE: 146 MMHG

## 2018-09-10 DIAGNOSIS — M47.816 LUMBAR SPONDYLOSIS: ICD-10-CM

## 2018-09-10 DIAGNOSIS — M47.26 OSTEOARTHRITIS OF SPINE WITH RADICULOPATHY, LUMBAR REGION: ICD-10-CM

## 2018-09-10 DIAGNOSIS — M51.37 DEGENERATION OF LUMBAR OR LUMBOSACRAL INTERVERTEBRAL DISC: ICD-10-CM

## 2018-09-10 DIAGNOSIS — G89.4 CHRONIC PAIN SYNDROME: ICD-10-CM

## 2018-09-10 DIAGNOSIS — M51.36 DDD (DEGENERATIVE DISC DISEASE), LUMBAR: ICD-10-CM

## 2018-09-10 DIAGNOSIS — M79.7 FIBROMYALGIA: ICD-10-CM

## 2018-09-10 PROCEDURE — 1101F PT FALLS ASSESS-DOCD LE1/YR: CPT | Performed by: INTERNAL MEDICINE

## 2018-09-10 PROCEDURE — 3017F COLORECTAL CA SCREEN DOC REV: CPT | Performed by: INTERNAL MEDICINE

## 2018-09-10 PROCEDURE — 4004F PT TOBACCO SCREEN RCVD TLK: CPT | Performed by: INTERNAL MEDICINE

## 2018-09-10 PROCEDURE — 1090F PRES/ABSN URINE INCON ASSESS: CPT | Performed by: INTERNAL MEDICINE

## 2018-09-10 PROCEDURE — 99213 OFFICE O/P EST LOW 20 MIN: CPT | Performed by: INTERNAL MEDICINE

## 2018-09-10 PROCEDURE — G8399 PT W/DXA RESULTS DOCUMENT: HCPCS | Performed by: INTERNAL MEDICINE

## 2018-09-10 PROCEDURE — G8427 DOCREV CUR MEDS BY ELIG CLIN: HCPCS | Performed by: INTERNAL MEDICINE

## 2018-09-10 PROCEDURE — 1123F ACP DISCUSS/DSCN MKR DOCD: CPT | Performed by: INTERNAL MEDICINE

## 2018-09-10 PROCEDURE — G8417 CALC BMI ABV UP PARAM F/U: HCPCS | Performed by: INTERNAL MEDICINE

## 2018-09-10 PROCEDURE — 4040F PNEUMOC VAC/ADMIN/RCVD: CPT | Performed by: INTERNAL MEDICINE

## 2018-09-10 RX ORDER — CALCIUM CARBONATE/VITAMIN D3 500-10/5ML
LIQUID (ML) ORAL
Qty: 60 CAPSULE | Refills: 1 | Status: SHIPPED | OUTPATIENT
Start: 2018-09-10 | End: 2018-10-05 | Stop reason: SDUPTHER

## 2018-09-10 RX ORDER — DULOXETIN HYDROCHLORIDE 60 MG/1
CAPSULE, DELAYED RELEASE ORAL
Qty: 30 CAPSULE | Refills: 1 | Status: SHIPPED | OUTPATIENT
Start: 2018-09-10 | End: 2018-10-05 | Stop reason: SDUPTHER

## 2018-09-10 RX ORDER — DULOXETIN HYDROCHLORIDE 30 MG/1
CAPSULE, DELAYED RELEASE ORAL
Qty: 30 CAPSULE | Refills: 1 | Status: SHIPPED | OUTPATIENT
Start: 2018-09-10 | End: 2018-10-05 | Stop reason: SDUPTHER

## 2018-09-10 RX ORDER — AMITRIPTYLINE HYDROCHLORIDE 25 MG/1
TABLET, FILM COATED ORAL
Qty: 30 TABLET | Refills: 1 | Status: SHIPPED | OUTPATIENT
Start: 2018-09-10 | End: 2018-10-05 | Stop reason: SDUPTHER

## 2018-09-10 RX ORDER — MELOXICAM 15 MG/1
TABLET ORAL
Qty: 30 TABLET | Refills: 0 | Status: SHIPPED | OUTPATIENT
Start: 2018-09-10 | End: 2018-10-05 | Stop reason: SDUPTHER

## 2018-09-10 RX ORDER — TIZANIDINE 4 MG/1
TABLET ORAL
Qty: 60 TABLET | Refills: 1 | Status: SHIPPED | OUTPATIENT
Start: 2018-09-10 | End: 2018-10-05 | Stop reason: SDUPTHER

## 2018-09-10 RX ORDER — OXYCODONE AND ACETAMINOPHEN 7.5; 325 MG/1; MG/1
1 TABLET ORAL EVERY 6 HOURS PRN
Qty: 112 TABLET | Refills: 0 | Status: SHIPPED | OUTPATIENT
Start: 2018-09-10 | End: 2018-10-05 | Stop reason: SDUPTHER

## 2018-09-10 RX ORDER — GABAPENTIN 600 MG/1
600 TABLET ORAL 3 TIMES DAILY
Qty: 90 TABLET | Refills: 1 | Status: SHIPPED | OUTPATIENT
Start: 2018-09-10 | End: 2018-10-05 | Stop reason: SDUPTHER

## 2018-09-10 NOTE — PROGRESS NOTES
MG tablet Take one tablet po BID 60 tablet 1    DULoxetine (CYMBALTA) 30 MG extended release capsule Take 1 tablet po in the am 30 capsule 1    DULoxetine (CYMBALTA) 60 MG extended release capsule Take one tablet at night 30 capsule 1    Magnesium Oxide 400 MG CAPS Take 1 tablet by mouth BID 60 capsule 1    amitriptyline (ELAVIL) 25 MG tablet TAKE 1 TABLET BY MOUTH NIGHTLY 30 tablet 1    gabapentin (NEURONTIN) 600 MG tablet Take 1 tablet by mouth 3 times daily for 30 days. . 90 tablet 1    oxyCODONE-acetaminophen (PERCOCET) 7.5-325 MG per tablet Take 1 tablet by mouth every 6 hours as needed for Pain for up to 28 days. Max 4 a day. 112 tablet 0    pantoprazole (PROTONIX) 20 MG tablet TAKE 1 TABLET BY MOUTH DAILY 30 tablet 5    atorvastatin (LIPITOR) 80 MG tablet TAKE 1 TABLET BY MOUTH EVERY DAY FOR CHOLESTEROL 30 tablet 5    meloxicam (MOBIC) 15 MG tablet TAKE 1 TABLET BY MOUTH DAILY 30 tablet 0    Fluticasone Furoate-Vilanterol (BREO ELLIPTA) 200-25 MCG/INH AEPB Inhale 1 puff into the lungs daily 1 each 0    albuterol sulfate  (90 Base) MCG/ACT inhaler Inhale 2 puffs into the lungs every 6 hours as needed for Shortness of Breath 1 Inhaler 5    Fluticasone Furoate-Vilanterol (BREO ELLIPTA) 200-25 MCG/INH AEPB INHALE 1 PUFF INTO THE LUNGS DAILY 1 each 5    vitamin D (ERGOCALCIFEROL) 10624 units CAPS capsule TAKE 1 CAPSULE BY MOUTH TWICE WEEKLY 24 capsule 1    calcium carbonate 600 MG TABS tablet Take 1 tablet by mouth 2 times daily       No facility-administered medications prior to visit. SOCIAL/FAMILY/PAST MEDICAL HISTORY: Ms. Contreras [facility], family and past medical history was reviewed. REVIEW OF SYSTEMS:    Respiratory: Negative for apnea, chest tightness and shortness of breath or change in baseline breathing. Gastrointestinal: Negative for nausea, vomiting, abdominal pain, diarrhea, constipation, blood in stool and abdominal distention.        PHYSICAL EXAM:   Nursing note and

## 2018-10-05 ENCOUNTER — OFFICE VISIT (OUTPATIENT)
Dept: PAIN MANAGEMENT | Age: 73
End: 2018-10-05
Payer: MEDICARE

## 2018-10-05 VITALS
HEART RATE: 78 BPM | SYSTOLIC BLOOD PRESSURE: 119 MMHG | WEIGHT: 150 LBS | BODY MASS INDEX: 25.75 KG/M2 | DIASTOLIC BLOOD PRESSURE: 68 MMHG

## 2018-10-05 DIAGNOSIS — M47.26 OSTEOARTHRITIS OF SPINE WITH RADICULOPATHY, LUMBAR REGION: ICD-10-CM

## 2018-10-05 DIAGNOSIS — M51.36 DDD (DEGENERATIVE DISC DISEASE), LUMBAR: ICD-10-CM

## 2018-10-05 DIAGNOSIS — M79.7 FIBROMYALGIA: ICD-10-CM

## 2018-10-05 DIAGNOSIS — M51.37 DEGENERATION OF LUMBAR OR LUMBOSACRAL INTERVERTEBRAL DISC: ICD-10-CM

## 2018-10-05 DIAGNOSIS — M47.816 LUMBAR SPONDYLOSIS: ICD-10-CM

## 2018-10-05 DIAGNOSIS — G89.4 CHRONIC PAIN SYNDROME: ICD-10-CM

## 2018-10-05 PROCEDURE — 1090F PRES/ABSN URINE INCON ASSESS: CPT | Performed by: INTERNAL MEDICINE

## 2018-10-05 PROCEDURE — 99213 OFFICE O/P EST LOW 20 MIN: CPT | Performed by: INTERNAL MEDICINE

## 2018-10-05 PROCEDURE — 1123F ACP DISCUSS/DSCN MKR DOCD: CPT | Performed by: INTERNAL MEDICINE

## 2018-10-05 PROCEDURE — 4040F PNEUMOC VAC/ADMIN/RCVD: CPT | Performed by: INTERNAL MEDICINE

## 2018-10-05 PROCEDURE — 1101F PT FALLS ASSESS-DOCD LE1/YR: CPT | Performed by: INTERNAL MEDICINE

## 2018-10-05 PROCEDURE — G8484 FLU IMMUNIZE NO ADMIN: HCPCS | Performed by: INTERNAL MEDICINE

## 2018-10-05 PROCEDURE — 3017F COLORECTAL CA SCREEN DOC REV: CPT | Performed by: INTERNAL MEDICINE

## 2018-10-05 PROCEDURE — G8417 CALC BMI ABV UP PARAM F/U: HCPCS | Performed by: INTERNAL MEDICINE

## 2018-10-05 PROCEDURE — G8399 PT W/DXA RESULTS DOCUMENT: HCPCS | Performed by: INTERNAL MEDICINE

## 2018-10-05 PROCEDURE — 4004F PT TOBACCO SCREEN RCVD TLK: CPT | Performed by: INTERNAL MEDICINE

## 2018-10-05 PROCEDURE — G8428 CUR MEDS NOT DOCUMENT: HCPCS | Performed by: INTERNAL MEDICINE

## 2018-10-05 RX ORDER — CALCIUM CARBONATE/VITAMIN D3 500-10/5ML
LIQUID (ML) ORAL
Qty: 60 CAPSULE | Refills: 1 | Status: SHIPPED | OUTPATIENT
Start: 2018-10-05 | End: 2018-11-06 | Stop reason: SDUPTHER

## 2018-10-05 RX ORDER — GABAPENTIN 600 MG/1
600 TABLET ORAL 3 TIMES DAILY
Qty: 90 TABLET | Refills: 0 | Status: SHIPPED | OUTPATIENT
Start: 2018-10-05 | End: 2018-11-06 | Stop reason: SDUPTHER

## 2018-10-05 RX ORDER — DULOXETIN HYDROCHLORIDE 30 MG/1
CAPSULE, DELAYED RELEASE ORAL
Qty: 30 CAPSULE | Refills: 0 | Status: SHIPPED | OUTPATIENT
Start: 2018-10-05 | End: 2018-11-06 | Stop reason: SDUPTHER

## 2018-10-05 RX ORDER — OXYCODONE AND ACETAMINOPHEN 7.5; 325 MG/1; MG/1
1 TABLET ORAL EVERY 6 HOURS PRN
Qty: 120 TABLET | Refills: 0 | Status: SHIPPED | OUTPATIENT
Start: 2018-10-05 | End: 2018-11-06 | Stop reason: SDUPTHER

## 2018-10-05 RX ORDER — MELOXICAM 15 MG/1
TABLET ORAL
Qty: 30 TABLET | Refills: 0 | Status: SHIPPED | OUTPATIENT
Start: 2018-10-05 | End: 2018-11-06 | Stop reason: SDUPTHER

## 2018-10-05 RX ORDER — AMITRIPTYLINE HYDROCHLORIDE 25 MG/1
TABLET, FILM COATED ORAL
Qty: 30 TABLET | Refills: 0 | Status: SHIPPED | OUTPATIENT
Start: 2018-10-05 | End: 2018-11-06 | Stop reason: SDUPTHER

## 2018-10-05 RX ORDER — DULOXETIN HYDROCHLORIDE 60 MG/1
CAPSULE, DELAYED RELEASE ORAL
Qty: 30 CAPSULE | Refills: 0 | Status: SHIPPED | OUTPATIENT
Start: 2018-10-05 | End: 2018-11-06 | Stop reason: SDUPTHER

## 2018-10-05 RX ORDER — TIZANIDINE 4 MG/1
TABLET ORAL
Qty: 60 TABLET | Refills: 0 | Status: SHIPPED | OUTPATIENT
Start: 2018-10-05 | End: 2018-11-06 | Stop reason: SDUPTHER

## 2018-10-05 NOTE — PROGRESS NOTES
Safia Nunes  1945  [id number]    HISTORY OF PRESENT ILLNESS:  Ms. Lisa Morris is a 72 y.o. female returns for a follow up visit for multiple medical problems. Her current presenting problems are   1. Chronic pain syndrome    2. Degeneration of lumbar or lumbosacral intervertebral disc    3. Fibromyalgia    4. Lumbar spondylosis    5. Primary insomnia    6. Drug-induced constipation    7. Moderate episode of recurrent major depressive disorder ([address]    8. Gastroesophageal reflux disease without esophagitis    . As per information/history obtained from the PADT(patient assessment and documentation tool) - She complains of pain in the lower back with radiation to the buttocks She rates the pain 7/10 and describes it as aching. Pain is made worse by: standing. Current treatment regimen has helped relieve about 70% of the pain. She denies side effects from the current pain regimen. Patient reports that since the last follow up visit the physical functioning is unchanged, family/social relationships are unchanged, mood is unchanged and sleep patterns are unchanged, and that the overall functioning is unchanged. Patient denies neurological bowel or bladder. Patient denies misusing/abusing her narcotic pain medications or using any illegal drugs. There are No indicators for possible drug abuse, addiction or diversion problems. Upon obtaining the medical history from Ms. Lisa Morris regarding today's office visit for her presenting problems, patient states she has been doing fair, she had a good month. Ms. Lisa Morris says the injections still helping. She says she is managing her ADL's. She mentions she is dong ok with the medications, she denies any side effects. ALLERGIES: Patients list of allergies were reviewed     MEDICATIONS: Ms. Lisa Morris list of medications were reviewed. Her current medications are   Outpatient Medications Prior to Visit   Medication Sig Dispense Refill    tiZANidine (ZANAFLEX) 4 MG tablet Take /68   Pulse 78   Wt 150 lb (68 kg)   BMI 25.75 kg/m²   Constitutional: She appears well-developed and well-nourished. No acute distress. Skin: Skin is warm and dry, good turgor. No rash noted. She is not diaphoretic. Cardiovascular: Normal rate, regular rhythm, normal heart sounds, and does not have murmur. Pulmonary/Chest: Effort normal. No respiratory distress. She does not have wheezes in the lung fields. She has no rales. Neurological/Psychiatric:She is alert and oriented to person, place, and time. Coordination is  normal. Her mood isAppropriate and affect is Neutral/Euthymic(normal) . IMPRESSION:   1. Chronic pain syndrome    2. Degeneration of lumbar or lumbosacral intervertebral disc    3. Fibromyalgia    4. Lumbar spondylosis        PLAN:  Informed verbal consent was obtained  -[place] record was obtained and reviewed  for the last one year and no indicators of drug misuse  were found. Any other controlled substance prescriptions  seen on the record have been accounted for, I am aware of the patient receiving these medications. Jasmina Mejia  [place] record will be rechecked as part of office protocol.   -Continue with current regimen  -ROM/Stretching exercises as advised  -she was advised proper sleep hygiene-told to avoid:use of caffeine or other stimulants after noon, alcohol use near bedtime, long or frequent naps during the day, erratic sleep schedule, heavy meals near bedtime, vigorous exercise near bedtime and use of electronic devices near bedtime, continue with Elavil  -She was advised to increase fluids ( 5-7  glasses of fluid daily), limit caffeine, avoid cheese products, increase dietary fiber, increase activity and exercise as tolerated and relax regularly and enjoy meals     Current Outpatient Prescriptions   Medication Sig Dispense Refill    tiZANidine (ZANAFLEX) 4 MG tablet Take one tablet po BID 60 tablet 1    DULoxetine (CYMBALTA) 30 MG extended release capsule Take 1 tablet po in the am 30 capsule 1    DULoxetine (CYMBALTA) 60 MG extended release capsule Take one tablet at night 30 capsule 1    Magnesium Oxide 400 MG CAPS Take 1 tablet by mouth BID 60 capsule 1    amitriptyline (ELAVIL) 25 MG tablet TAKE 1 TABLET BY MOUTH NIGHTLY 30 tablet 1    gabapentin (NEURONTIN) 600 MG tablet Take 1 tablet by mouth 3 times daily for 30 days. . 90 tablet 1    oxyCODONE-acetaminophen (PERCOCET) 7.5-325 MG per tablet Take 1 tablet by mouth every 6 hours as needed for Pain for up to 28 days. Max 4 a day. 112 tablet 0    meloxicam (MOBIC) 15 MG tablet TAKE 1 TABLET BY MOUTH DAILY 30 tablet 0    pantoprazole (PROTONIX) 20 MG tablet TAKE 1 TABLET BY MOUTH DAILY 30 tablet 5    atorvastatin (LIPITOR) 80 MG tablet TAKE 1 TABLET BY MOUTH EVERY DAY FOR CHOLESTEROL 30 tablet 5    Fluticasone Furoate-Vilanterol (BREO ELLIPTA) 200-25 MCG/INH AEPB Inhale 1 puff into the lungs daily 1 each 0    albuterol sulfate  (90 Base) MCG/ACT inhaler Inhale 2 puffs into the lungs every 6 hours as needed for Shortness of Breath 1 Inhaler 5    Fluticasone Furoate-Vilanterol (BREO ELLIPTA) 200-25 MCG/INH AEPB INHALE 1 PUFF INTO THE LUNGS DAILY 1 each 5    vitamin D (ERGOCALCIFEROL) 91907 units CAPS capsule TAKE 1 CAPSULE BY MOUTH TWICE WEEKLY 24 capsule 1    calcium carbonate 600 MG TABS tablet Take 1 tablet by mouth 2 times daily       No current facility-administered medications for this visit. I will continue her current medication regimen  which is part of the above treatment schedule. It has been helping with Ms. Doug Moreira chronic  medical problems which for this visit include:   Diagnoses of Chronic pain syndrome, Degeneration of lumbar or lumbosacral intervertebral disc, Fibromyalgia, Lumbar spondylosis, Primary insomnia, Drug-induced constipation, Moderate episode of recurrent major depressive disorder ([address] and Gastroesophageal reflux disease without esophagitis were pertinent to this visit.    Risks

## 2018-11-06 ENCOUNTER — OFFICE VISIT (OUTPATIENT)
Dept: PAIN MANAGEMENT | Age: 73
End: 2018-11-06
Payer: MEDICARE

## 2018-11-06 ENCOUNTER — OFFICE VISIT (OUTPATIENT)
Dept: PULMONOLOGY | Age: 73
End: 2018-11-06
Payer: MEDICARE

## 2018-11-06 VITALS
TEMPERATURE: 98 F | RESPIRATION RATE: 16 BRPM | BODY MASS INDEX: 25.44 KG/M2 | WEIGHT: 149 LBS | HEART RATE: 80 BPM | DIASTOLIC BLOOD PRESSURE: 68 MMHG | HEIGHT: 64 IN | SYSTOLIC BLOOD PRESSURE: 120 MMHG | OXYGEN SATURATION: 95 %

## 2018-11-06 VITALS
WEIGHT: 148 LBS | BODY MASS INDEX: 25.4 KG/M2 | HEART RATE: 79 BPM | SYSTOLIC BLOOD PRESSURE: 121 MMHG | DIASTOLIC BLOOD PRESSURE: 67 MMHG

## 2018-11-06 DIAGNOSIS — Z72.0 TOBACCO ABUSE: ICD-10-CM

## 2018-11-06 DIAGNOSIS — M79.7 FIBROMYALGIA: ICD-10-CM

## 2018-11-06 DIAGNOSIS — M47.26 OSTEOARTHRITIS OF SPINE WITH RADICULOPATHY, LUMBAR REGION: ICD-10-CM

## 2018-11-06 DIAGNOSIS — J44.9 COPD, SEVERE (HCC): ICD-10-CM

## 2018-11-06 DIAGNOSIS — G89.4 CHRONIC PAIN SYNDROME: ICD-10-CM

## 2018-11-06 DIAGNOSIS — M51.37 DEGENERATION OF LUMBAR OR LUMBOSACRAL INTERVERTEBRAL DISC: ICD-10-CM

## 2018-11-06 DIAGNOSIS — M51.36 DDD (DEGENERATIVE DISC DISEASE), LUMBAR: ICD-10-CM

## 2018-11-06 DIAGNOSIS — M47.816 LUMBAR SPONDYLOSIS: ICD-10-CM

## 2018-11-06 PROCEDURE — 3023F SPIROM DOC REV: CPT | Performed by: INTERNAL MEDICINE

## 2018-11-06 PROCEDURE — 1101F PT FALLS ASSESS-DOCD LE1/YR: CPT | Performed by: INTERNAL MEDICINE

## 2018-11-06 PROCEDURE — 1123F ACP DISCUSS/DSCN MKR DOCD: CPT | Performed by: INTERNAL MEDICINE

## 2018-11-06 PROCEDURE — 1090F PRES/ABSN URINE INCON ASSESS: CPT | Performed by: INTERNAL MEDICINE

## 2018-11-06 PROCEDURE — G8926 SPIRO NO PERF OR DOC: HCPCS | Performed by: INTERNAL MEDICINE

## 2018-11-06 PROCEDURE — 4040F PNEUMOC VAC/ADMIN/RCVD: CPT | Performed by: INTERNAL MEDICINE

## 2018-11-06 PROCEDURE — 4004F PT TOBACCO SCREEN RCVD TLK: CPT | Performed by: INTERNAL MEDICINE

## 2018-11-06 PROCEDURE — G8417 CALC BMI ABV UP PARAM F/U: HCPCS | Performed by: INTERNAL MEDICINE

## 2018-11-06 PROCEDURE — G8399 PT W/DXA RESULTS DOCUMENT: HCPCS | Performed by: INTERNAL MEDICINE

## 2018-11-06 PROCEDURE — G8482 FLU IMMUNIZE ORDER/ADMIN: HCPCS | Performed by: INTERNAL MEDICINE

## 2018-11-06 PROCEDURE — 99213 OFFICE O/P EST LOW 20 MIN: CPT | Performed by: INTERNAL MEDICINE

## 2018-11-06 PROCEDURE — G8427 DOCREV CUR MEDS BY ELIG CLIN: HCPCS | Performed by: INTERNAL MEDICINE

## 2018-11-06 PROCEDURE — 3017F COLORECTAL CA SCREEN DOC REV: CPT | Performed by: INTERNAL MEDICINE

## 2018-11-06 RX ORDER — MELOXICAM 15 MG/1
TABLET ORAL
Qty: 30 TABLET | Refills: 0 | Status: SHIPPED | OUTPATIENT
Start: 2018-11-06 | End: 2019-01-02 | Stop reason: SDUPTHER

## 2018-11-06 RX ORDER — DULOXETIN HYDROCHLORIDE 30 MG/1
CAPSULE, DELAYED RELEASE ORAL
Qty: 30 CAPSULE | Refills: 0 | Status: SHIPPED | OUTPATIENT
Start: 2018-11-06 | End: 2019-01-02 | Stop reason: SDUPTHER

## 2018-11-06 RX ORDER — CALCIUM CARBONATE/VITAMIN D3 500-10/5ML
LIQUID (ML) ORAL
Qty: 60 CAPSULE | Refills: 1 | Status: SHIPPED | OUTPATIENT
Start: 2018-11-06 | End: 2019-01-02 | Stop reason: SDUPTHER

## 2018-11-06 RX ORDER — TIZANIDINE 4 MG/1
TABLET ORAL
Qty: 60 TABLET | Refills: 0 | Status: SHIPPED | OUTPATIENT
Start: 2018-11-06 | End: 2019-01-02 | Stop reason: SDUPTHER

## 2018-11-06 RX ORDER — OXYCODONE AND ACETAMINOPHEN 7.5; 325 MG/1; MG/1
1 TABLET ORAL EVERY 6 HOURS PRN
Qty: 112 TABLET | Refills: 0 | Status: SHIPPED | OUTPATIENT
Start: 2018-11-06 | End: 2018-12-04 | Stop reason: SDUPTHER

## 2018-11-06 RX ORDER — DULOXETIN HYDROCHLORIDE 60 MG/1
CAPSULE, DELAYED RELEASE ORAL
Qty: 30 CAPSULE | Refills: 0 | Status: SHIPPED | OUTPATIENT
Start: 2018-11-06 | End: 2019-01-02 | Stop reason: SDUPTHER

## 2018-11-06 RX ORDER — AMITRIPTYLINE HYDROCHLORIDE 25 MG/1
TABLET, FILM COATED ORAL
Qty: 30 TABLET | Refills: 0 | Status: SHIPPED | OUTPATIENT
Start: 2018-11-06 | End: 2019-01-02 | Stop reason: SDUPTHER

## 2018-11-06 RX ORDER — GABAPENTIN 600 MG/1
600 TABLET ORAL 3 TIMES DAILY
Qty: 90 TABLET | Refills: 0 | Status: SHIPPED | OUTPATIENT
Start: 2018-11-06 | End: 2019-01-02 | Stop reason: SDUPTHER

## 2018-11-06 NOTE — PROGRESS NOTES
crackles. No wheezes. No rhonchi. CV: Regular rate. Regular rhythm. No murmur or rub. No edema. Skin: Warm, dry, normal texture and turgor. No nodule on exposed extremities. M/S: No cyanosis. No clubbing. No joint deformity. Psych: Oriented x 3. No anxiety. Awake. Alert. Intact judgement and insight. Good Mood / Affect. Memory appears in tact        Current Outpatient Prescriptions   Medication Sig Dispense Refill    tiZANidine (ZANAFLEX) 4 MG tablet Take one tablet po BID 60 tablet 0    DULoxetine (CYMBALTA) 30 MG extended release capsule Take 1 tablet po in the am 30 capsule 0    DULoxetine (CYMBALTA) 60 MG extended release capsule Take one tablet at night 30 capsule 0    Magnesium Oxide 400 MG CAPS Take 1 tablet by mouth BID 60 capsule 1    amitriptyline (ELAVIL) 25 MG tablet TAKE 1 TABLET BY MOUTH NIGHTLY 30 tablet 0    gabapentin (NEURONTIN) 600 MG tablet Take 1 tablet by mouth 3 times daily for 30 days. . 90 tablet 0    meloxicam (MOBIC) 15 MG tablet TAKE 1 TABLET BY MOUTH DAILY 30 tablet 0    oxyCODONE-acetaminophen (PERCOCET) 7.5-325 MG per tablet Take 1 tablet by mouth every 6 hours as needed for Pain for up to 28 days. Max 4 a day. 112 tablet 0    pantoprazole (PROTONIX) 20 MG tablet TAKE 1 TABLET BY MOUTH DAILY 30 tablet 5    atorvastatin (LIPITOR) 80 MG tablet TAKE 1 TABLET BY MOUTH EVERY DAY FOR CHOLESTEROL 30 tablet 5    albuterol sulfate  (90 Base) MCG/ACT inhaler Inhale 2 puffs into the lungs every 6 hours as needed for Shortness of Breath 1 Inhaler 5    Fluticasone Furoate-Vilanterol (BREO ELLIPTA) 200-25 MCG/INH AEPB INHALE 1 PUFF INTO THE LUNGS DAILY 1 each 5    vitamin D (ERGOCALCIFEROL) 54505 units CAPS capsule TAKE 1 CAPSULE BY MOUTH TWICE WEEKLY 24 capsule 1    calcium carbonate 600 MG TABS tablet Take 1 tablet by mouth 2 times daily       No current facility-administered medications for this visit.         Data Reviewed:   CBC and Renal reviewed  Last CBC  Lab Results   Component Value Date    WBC 5.9 02/15/2017    RBC 4.07 02/15/2017    HGB 12.8 02/15/2017    MCV 95.4 02/15/2017     02/15/2017     Last Renal  Lab Results   Component Value Date     05/11/2018    K 5.0 05/11/2018     05/11/2018    CO2 25 05/11/2018    CO2 25 09/07/2017    CO2 27 02/15/2017    BUN 15 05/11/2018    CREATININE 0.8 05/11/2018    GLUCOSE 98 05/11/2018    CALCIUM 9.0 05/11/2018       Last ABG  POC Blood Gas: No results found for: POCPH, POCPCO2, POCPO2, POCHCO3, NBEA, PIQM1YFL  No results for input(s): PH, PCO2, PO2, HCO3, BE, O2SAT in the last 72 hours. Assessment:     COPD, Moderately severe  Tobacco abuse. Lung cancer screening 2.15.17    Plan:        Problem List Items Addressed This Visit     Tobacco abuse-advised to quit--lung cancer screening neg 2/17--repeat low dose ct 1 yr     Continues to smoke. Not ready to quit. discussed smoke cessation options. screening CAT scan completed this year. Need next year. COPD, severe ([address]     Continue to [facility]. Stable symptoms. Rare use albuterol. No changes today. This note was transcribed using [id number]. Please disregard any translational errors.

## 2018-12-04 ENCOUNTER — OFFICE VISIT (OUTPATIENT)
Dept: PAIN MANAGEMENT | Age: 73
End: 2018-12-04
Payer: MEDICARE

## 2018-12-04 VITALS
SYSTOLIC BLOOD PRESSURE: 159 MMHG | HEART RATE: 71 BPM | DIASTOLIC BLOOD PRESSURE: 67 MMHG | WEIGHT: 155 LBS | BODY MASS INDEX: 26.61 KG/M2

## 2018-12-04 DIAGNOSIS — M51.36 DDD (DEGENERATIVE DISC DISEASE), LUMBAR: ICD-10-CM

## 2018-12-04 DIAGNOSIS — G89.4 CHRONIC PAIN SYNDROME: ICD-10-CM

## 2018-12-04 DIAGNOSIS — M47.26 OSTEOARTHRITIS OF SPINE WITH RADICULOPATHY, LUMBAR REGION: ICD-10-CM

## 2018-12-04 DIAGNOSIS — M47.816 LUMBAR SPONDYLOSIS: ICD-10-CM

## 2018-12-04 DIAGNOSIS — M51.37 DEGENERATION OF LUMBAR OR LUMBOSACRAL INTERVERTEBRAL DISC: ICD-10-CM

## 2018-12-04 DIAGNOSIS — M79.7 FIBROMYALGIA: ICD-10-CM

## 2018-12-04 PROCEDURE — 4004F PT TOBACCO SCREEN RCVD TLK: CPT | Performed by: INTERNAL MEDICINE

## 2018-12-04 PROCEDURE — G8482 FLU IMMUNIZE ORDER/ADMIN: HCPCS | Performed by: INTERNAL MEDICINE

## 2018-12-04 PROCEDURE — 3017F COLORECTAL CA SCREEN DOC REV: CPT | Performed by: INTERNAL MEDICINE

## 2018-12-04 PROCEDURE — G8427 DOCREV CUR MEDS BY ELIG CLIN: HCPCS | Performed by: INTERNAL MEDICINE

## 2018-12-04 PROCEDURE — 99213 OFFICE O/P EST LOW 20 MIN: CPT | Performed by: INTERNAL MEDICINE

## 2018-12-04 PROCEDURE — G8417 CALC BMI ABV UP PARAM F/U: HCPCS | Performed by: INTERNAL MEDICINE

## 2018-12-04 PROCEDURE — 1090F PRES/ABSN URINE INCON ASSESS: CPT | Performed by: INTERNAL MEDICINE

## 2018-12-04 PROCEDURE — 1101F PT FALLS ASSESS-DOCD LE1/YR: CPT | Performed by: INTERNAL MEDICINE

## 2018-12-04 PROCEDURE — 4040F PNEUMOC VAC/ADMIN/RCVD: CPT | Performed by: INTERNAL MEDICINE

## 2018-12-04 PROCEDURE — G8399 PT W/DXA RESULTS DOCUMENT: HCPCS | Performed by: INTERNAL MEDICINE

## 2018-12-04 PROCEDURE — 1123F ACP DISCUSS/DSCN MKR DOCD: CPT | Performed by: INTERNAL MEDICINE

## 2018-12-04 RX ORDER — OXYCODONE AND ACETAMINOPHEN 7.5; 325 MG/1; MG/1
1 TABLET ORAL EVERY 6 HOURS PRN
Qty: 112 TABLET | Refills: 0 | Status: SHIPPED | OUTPATIENT
Start: 2018-12-04 | End: 2019-01-02 | Stop reason: SDUPTHER

## 2018-12-04 NOTE — PROGRESS NOTES
Alexandre Sensor  1945  [id number]      HISTORY OF PRESENT ILLNESS:  Ms. Titus Caballero is a 72 y.o. female returns for a follow up visit for pain management  She has a diagnosis of   1. Chronic pain syndrome    2. Degeneration of lumbar or lumbosacral intervertebral disc    3. Fibromyalgia    4. Primary insomnia    5. Drug-induced constipation    6. Moderate episode of recurrent major depressive disorder ([address]    . She complains of pain in the Low back  She rates the pain 4/10 and describes it as aching. Current treatment regimen has helped relieve about 60% of the pain. She denies any side effects from the current pain regimen. Patient reports that since the last follow up visit the physical functioning is unchanged, family/social relationships are unchanged, mood is unchanged sleep patterns are unchanged, and that the overall functioning is unchanged. Patient denies misusing/abusing her narcotic pain medications or using any illegal drugs. There are No indicators for possible drug abuse, addiction or diversion problems. Patient reports she is managing somewhat with her medications. She complains her back hurts the most. She says she has been complaint with her medications. She mentions she is using Percocet 4 per day. Ms. Titus Caballero states her breathing has been baseline     ALLERGIES: Patients list of allergies were reviewed     MEDICATIONS: Ms. Titus Caballero list of medications were reviewed. Her current medications are   Outpatient Medications Prior to Visit   Medication Sig Dispense Refill    tiZANidine (ZANAFLEX) 4 MG tablet Take one tablet po BID 60 tablet 0    DULoxetine (CYMBALTA) 30 MG extended release capsule Take 1 tablet po in the am 30 capsule 0    DULoxetine (CYMBALTA) 60 MG extended release capsule Take one tablet at night 30 capsule 0    Magnesium Oxide 400 MG CAPS Take 1 tablet by mouth BID 60 capsule 1    amitriptyline (ELAVIL) 25 MG tablet TAKE 1 TABLET BY MOUTH NIGHTLY 30 tablet 0    gabapentin narcotic pain medicines, advised against driving or handling machinery while adjusting the dose of medicines or if having cognitive  issues related to the current medications. Risk of overdose and death, if medicines not taken as prescribed, were also discussed. If the patient develops new symptoms or if the symptoms worsen, the patient should call the office. While transcribing every attempt was made to maintain the accuracy of the note in terms of it's contents,there may have been some errors made inadvertently. Thank you for allowing me to participate in the care of this patient.     Mathew Colin MD.    Cc: Eligio Reich, HECTOR - CNP

## 2019-01-01 DIAGNOSIS — E78.00 HYPERCHOLESTEREMIA: ICD-10-CM

## 2019-01-01 DIAGNOSIS — K21.9 GASTROESOPHAGEAL REFLUX DISEASE, ESOPHAGITIS PRESENCE NOT SPECIFIED: ICD-10-CM

## 2019-01-01 DIAGNOSIS — J44.9 COPD, SEVERE (HCC): ICD-10-CM

## 2019-01-02 ENCOUNTER — OFFICE VISIT (OUTPATIENT)
Dept: PAIN MANAGEMENT | Age: 74
End: 2019-01-02
Payer: MEDICARE

## 2019-01-02 ENCOUNTER — TELEPHONE (OUTPATIENT)
Dept: FAMILY MEDICINE CLINIC | Age: 74
End: 2019-01-02

## 2019-01-02 VITALS
HEART RATE: 90 BPM | WEIGHT: 145 LBS | SYSTOLIC BLOOD PRESSURE: 116 MMHG | DIASTOLIC BLOOD PRESSURE: 69 MMHG | BODY MASS INDEX: 24.89 KG/M2

## 2019-01-02 DIAGNOSIS — G89.4 CHRONIC PAIN SYNDROME: ICD-10-CM

## 2019-01-02 DIAGNOSIS — K21.9 GASTROESOPHAGEAL REFLUX DISEASE WITHOUT ESOPHAGITIS: ICD-10-CM

## 2019-01-02 DIAGNOSIS — M51.37 DEGENERATION OF LUMBAR OR LUMBOSACRAL INTERVERTEBRAL DISC: ICD-10-CM

## 2019-01-02 DIAGNOSIS — M47.816 LUMBAR SPONDYLOSIS: ICD-10-CM

## 2019-01-02 DIAGNOSIS — M51.36 DDD (DEGENERATIVE DISC DISEASE), LUMBAR: ICD-10-CM

## 2019-01-02 DIAGNOSIS — F51.01 PRIMARY INSOMNIA: ICD-10-CM

## 2019-01-02 DIAGNOSIS — F33.1 MODERATE EPISODE OF RECURRENT MAJOR DEPRESSIVE DISORDER (HCC): ICD-10-CM

## 2019-01-02 DIAGNOSIS — M47.26 OSTEOARTHRITIS OF SPINE WITH RADICULOPATHY, LUMBAR REGION: ICD-10-CM

## 2019-01-02 DIAGNOSIS — K59.03 DRUG-INDUCED CONSTIPATION: ICD-10-CM

## 2019-01-02 DIAGNOSIS — M79.7 FIBROMYALGIA: ICD-10-CM

## 2019-01-02 DIAGNOSIS — M70.61 TROCHANTERIC BURSITIS OF RIGHT HIP: ICD-10-CM

## 2019-01-02 PROCEDURE — 4004F PT TOBACCO SCREEN RCVD TLK: CPT | Performed by: INTERNAL MEDICINE

## 2019-01-02 PROCEDURE — 1123F ACP DISCUSS/DSCN MKR DOCD: CPT | Performed by: INTERNAL MEDICINE

## 2019-01-02 PROCEDURE — 20610 DRAIN/INJ JOINT/BURSA W/O US: CPT | Performed by: INTERNAL MEDICINE

## 2019-01-02 PROCEDURE — 4040F PNEUMOC VAC/ADMIN/RCVD: CPT | Performed by: INTERNAL MEDICINE

## 2019-01-02 PROCEDURE — G8399 PT W/DXA RESULTS DOCUMENT: HCPCS | Performed by: INTERNAL MEDICINE

## 2019-01-02 PROCEDURE — G8482 FLU IMMUNIZE ORDER/ADMIN: HCPCS | Performed by: INTERNAL MEDICINE

## 2019-01-02 PROCEDURE — 99214 OFFICE O/P EST MOD 30 MIN: CPT | Performed by: INTERNAL MEDICINE

## 2019-01-02 PROCEDURE — 3017F COLORECTAL CA SCREEN DOC REV: CPT | Performed by: INTERNAL MEDICINE

## 2019-01-02 PROCEDURE — G8420 CALC BMI NORM PARAMETERS: HCPCS | Performed by: INTERNAL MEDICINE

## 2019-01-02 PROCEDURE — 1090F PRES/ABSN URINE INCON ASSESS: CPT | Performed by: INTERNAL MEDICINE

## 2019-01-02 PROCEDURE — G8427 DOCREV CUR MEDS BY ELIG CLIN: HCPCS | Performed by: INTERNAL MEDICINE

## 2019-01-02 PROCEDURE — 1101F PT FALLS ASSESS-DOCD LE1/YR: CPT | Performed by: INTERNAL MEDICINE

## 2019-01-02 RX ORDER — AMITRIPTYLINE HYDROCHLORIDE 25 MG/1
TABLET, FILM COATED ORAL
Qty: 30 TABLET | Refills: 0 | Status: SHIPPED | OUTPATIENT
Start: 2019-01-02 | End: 2019-01-30 | Stop reason: SDUPTHER

## 2019-01-02 RX ORDER — TIZANIDINE 4 MG/1
TABLET ORAL
Qty: 60 TABLET | Refills: 0 | Status: SHIPPED | OUTPATIENT
Start: 2019-01-02 | End: 2019-01-30 | Stop reason: SDUPTHER

## 2019-01-02 RX ORDER — TRIAMCINOLONE ACETONIDE 40 MG/ML
40 INJECTION, SUSPENSION INTRA-ARTICULAR; INTRAMUSCULAR ONCE
Status: COMPLETED | OUTPATIENT
Start: 2019-01-02 | End: 2019-01-02

## 2019-01-02 RX ORDER — CALCIUM CARBONATE/VITAMIN D3 500-10/5ML
LIQUID (ML) ORAL
Qty: 60 CAPSULE | Refills: 1 | Status: SHIPPED | OUTPATIENT
Start: 2019-01-02 | End: 2019-01-30 | Stop reason: SDUPTHER

## 2019-01-02 RX ORDER — PANTOPRAZOLE SODIUM 20 MG/1
TABLET, DELAYED RELEASE ORAL
Qty: 30 TABLET | Refills: 0 | Status: SHIPPED | OUTPATIENT
Start: 2019-01-02 | End: 2019-01-29 | Stop reason: SDUPTHER

## 2019-01-02 RX ORDER — MELOXICAM 15 MG/1
TABLET ORAL
Qty: 30 TABLET | Refills: 0 | Status: SHIPPED | OUTPATIENT
Start: 2019-01-02 | End: 2019-01-30 | Stop reason: SDUPTHER

## 2019-01-02 RX ORDER — ATORVASTATIN CALCIUM 80 MG/1
TABLET, FILM COATED ORAL
Qty: 30 TABLET | Refills: 0 | Status: SHIPPED | OUTPATIENT
Start: 2019-01-02 | End: 2019-01-29 | Stop reason: SDUPTHER

## 2019-01-02 RX ORDER — DULOXETIN HYDROCHLORIDE 60 MG/1
CAPSULE, DELAYED RELEASE ORAL
Qty: 30 CAPSULE | Refills: 0 | Status: SHIPPED | OUTPATIENT
Start: 2019-01-02 | End: 2019-01-30 | Stop reason: SDUPTHER

## 2019-01-02 RX ORDER — GABAPENTIN 600 MG/1
600 TABLET ORAL 3 TIMES DAILY
Qty: 90 TABLET | Refills: 0 | Status: SHIPPED | OUTPATIENT
Start: 2019-01-02 | End: 2019-01-30 | Stop reason: SDUPTHER

## 2019-01-02 RX ORDER — OXYCODONE AND ACETAMINOPHEN 7.5; 325 MG/1; MG/1
1 TABLET ORAL EVERY 6 HOURS PRN
Qty: 112 TABLET | Refills: 0 | Status: SHIPPED | OUTPATIENT
Start: 2019-01-02 | End: 2019-01-30 | Stop reason: SDDI

## 2019-01-02 RX ORDER — DULOXETIN HYDROCHLORIDE 30 MG/1
CAPSULE, DELAYED RELEASE ORAL
Qty: 30 CAPSULE | Refills: 0 | Status: SHIPPED | OUTPATIENT
Start: 2019-01-02 | End: 2019-01-30 | Stop reason: SDUPTHER

## 2019-01-02 RX ADMIN — TRIAMCINOLONE ACETONIDE 40 MG: 40 INJECTION, SUSPENSION INTRA-ARTICULAR; INTRAMUSCULAR at 10:44

## 2019-01-04 ENCOUNTER — OFFICE VISIT (OUTPATIENT)
Dept: FAMILY MEDICINE CLINIC | Age: 74
End: 2019-01-04
Payer: MEDICARE

## 2019-01-04 VITALS
HEART RATE: 64 BPM | OXYGEN SATURATION: 98 % | RESPIRATION RATE: 18 BRPM | BODY MASS INDEX: 24.72 KG/M2 | SYSTOLIC BLOOD PRESSURE: 124 MMHG | WEIGHT: 144 LBS | DIASTOLIC BLOOD PRESSURE: 80 MMHG

## 2019-01-04 DIAGNOSIS — E78.00 HYPERCHOLESTEREMIA: ICD-10-CM

## 2019-01-04 DIAGNOSIS — K21.9 GASTROESOPHAGEAL REFLUX DISEASE WITHOUT ESOPHAGITIS: ICD-10-CM

## 2019-01-04 DIAGNOSIS — R73.03 PREDIABETES: ICD-10-CM

## 2019-01-04 DIAGNOSIS — Z72.0 TOBACCO ABUSE: ICD-10-CM

## 2019-01-04 DIAGNOSIS — M85.89 OSTEOPENIA OF MULTIPLE SITES: ICD-10-CM

## 2019-01-04 DIAGNOSIS — E78.00 HYPERCHOLESTEREMIA: Primary | ICD-10-CM

## 2019-01-04 DIAGNOSIS — E55.9 VITAMIN D DEFICIENCY: ICD-10-CM

## 2019-01-04 DIAGNOSIS — F51.01 PRIMARY INSOMNIA: ICD-10-CM

## 2019-01-04 DIAGNOSIS — J44.9 COPD, SEVERE (HCC): ICD-10-CM

## 2019-01-04 LAB
A/G RATIO: 2 (ref 1.1–2.2)
ALBUMIN SERPL-MCNC: 4.7 G/DL (ref 3.4–5)
ALP BLD-CCNC: 90 U/L (ref 40–129)
ALT SERPL-CCNC: 13 U/L (ref 10–40)
ANION GAP SERPL CALCULATED.3IONS-SCNC: 16 MMOL/L (ref 3–16)
AST SERPL-CCNC: 13 U/L (ref 15–37)
BILIRUB SERPL-MCNC: 0.4 MG/DL (ref 0–1)
BUN BLDV-MCNC: 20 MG/DL (ref 7–20)
CALCIUM SERPL-MCNC: 9.6 MG/DL (ref 8.3–10.6)
CHLORIDE BLD-SCNC: 102 MMOL/L (ref 99–110)
CHOLESTEROL, TOTAL: 190 MG/DL (ref 0–199)
CO2: 25 MMOL/L (ref 21–32)
CREAT SERPL-MCNC: 0.8 MG/DL (ref 0.6–1.2)
GFR AFRICAN AMERICAN: >60
GFR NON-AFRICAN AMERICAN: >60
GLOBULIN: 2.4 G/DL
GLUCOSE BLD-MCNC: 120 MG/DL (ref 70–99)
HDLC SERPL-MCNC: 55 MG/DL (ref 40–60)
LDL CHOLESTEROL CALCULATED: 109 MG/DL
POTASSIUM SERPL-SCNC: 4.2 MMOL/L (ref 3.5–5.1)
SODIUM BLD-SCNC: 143 MMOL/L (ref 136–145)
TOTAL PROTEIN: 7.1 G/DL (ref 6.4–8.2)
TRIGL SERPL-MCNC: 131 MG/DL (ref 0–150)
VITAMIN D 25-HYDROXY: 26.9 NG/ML
VLDLC SERPL CALC-MCNC: 26 MG/DL

## 2019-01-04 PROCEDURE — 4040F PNEUMOC VAC/ADMIN/RCVD: CPT | Performed by: NURSE PRACTITIONER

## 2019-01-04 PROCEDURE — G8926 SPIRO NO PERF OR DOC: HCPCS | Performed by: NURSE PRACTITIONER

## 2019-01-04 PROCEDURE — 1090F PRES/ABSN URINE INCON ASSESS: CPT | Performed by: NURSE PRACTITIONER

## 2019-01-04 PROCEDURE — 3023F SPIROM DOC REV: CPT | Performed by: NURSE PRACTITIONER

## 2019-01-04 PROCEDURE — G8482 FLU IMMUNIZE ORDER/ADMIN: HCPCS | Performed by: NURSE PRACTITIONER

## 2019-01-04 PROCEDURE — 99214 OFFICE O/P EST MOD 30 MIN: CPT | Performed by: NURSE PRACTITIONER

## 2019-01-04 PROCEDURE — 1123F ACP DISCUSS/DSCN MKR DOCD: CPT | Performed by: NURSE PRACTITIONER

## 2019-01-04 PROCEDURE — G8399 PT W/DXA RESULTS DOCUMENT: HCPCS | Performed by: NURSE PRACTITIONER

## 2019-01-04 PROCEDURE — 3017F COLORECTAL CA SCREEN DOC REV: CPT | Performed by: NURSE PRACTITIONER

## 2019-01-04 PROCEDURE — 4004F PT TOBACCO SCREEN RCVD TLK: CPT | Performed by: NURSE PRACTITIONER

## 2019-01-04 PROCEDURE — 1101F PT FALLS ASSESS-DOCD LE1/YR: CPT | Performed by: NURSE PRACTITIONER

## 2019-01-04 PROCEDURE — G8427 DOCREV CUR MEDS BY ELIG CLIN: HCPCS | Performed by: NURSE PRACTITIONER

## 2019-01-04 PROCEDURE — G8420 CALC BMI NORM PARAMETERS: HCPCS | Performed by: NURSE PRACTITIONER

## 2019-01-04 ASSESSMENT — ENCOUNTER SYMPTOMS
SHORTNESS OF BREATH: 1
DIARRHEA: 0
CONSTIPATION: 0
VOMITING: 0
CHEST TIGHTNESS: 0
NAUSEA: 0

## 2019-01-05 LAB
ESTIMATED AVERAGE GLUCOSE: 125.5 MG/DL
HBA1C MFR BLD: 6 %

## 2019-01-29 DIAGNOSIS — E78.00 HYPERCHOLESTEREMIA: ICD-10-CM

## 2019-01-29 DIAGNOSIS — K21.9 GASTROESOPHAGEAL REFLUX DISEASE, ESOPHAGITIS PRESENCE NOT SPECIFIED: ICD-10-CM

## 2019-01-29 RX ORDER — ATORVASTATIN CALCIUM 80 MG/1
TABLET, FILM COATED ORAL
Qty: 30 TABLET | Refills: 5 | Status: SHIPPED | OUTPATIENT
Start: 2019-01-29 | End: 2019-04-03 | Stop reason: SDUPTHER

## 2019-01-29 RX ORDER — PANTOPRAZOLE SODIUM 20 MG/1
TABLET, DELAYED RELEASE ORAL
Qty: 30 TABLET | Refills: 5 | Status: SHIPPED | OUTPATIENT
Start: 2019-01-29 | End: 2019-07-05 | Stop reason: ALTCHOICE

## 2019-01-30 ENCOUNTER — OFFICE VISIT (OUTPATIENT)
Dept: PAIN MANAGEMENT | Age: 74
End: 2019-01-30
Payer: MEDICARE

## 2019-01-30 VITALS
HEART RATE: 82 BPM | SYSTOLIC BLOOD PRESSURE: 128 MMHG | DIASTOLIC BLOOD PRESSURE: 75 MMHG | BODY MASS INDEX: 25.75 KG/M2 | WEIGHT: 150 LBS

## 2019-01-30 DIAGNOSIS — M47.26 OSTEOARTHRITIS OF SPINE WITH RADICULOPATHY, LUMBAR REGION: ICD-10-CM

## 2019-01-30 DIAGNOSIS — M51.37 DEGENERATION OF LUMBAR OR LUMBOSACRAL INTERVERTEBRAL DISC: ICD-10-CM

## 2019-01-30 DIAGNOSIS — G89.4 CHRONIC PAIN SYNDROME: ICD-10-CM

## 2019-01-30 DIAGNOSIS — R25.2 MUSCLE CRAMPING: ICD-10-CM

## 2019-01-30 DIAGNOSIS — M51.36 DDD (DEGENERATIVE DISC DISEASE), LUMBAR: ICD-10-CM

## 2019-01-30 DIAGNOSIS — M47.816 LUMBAR SPONDYLOSIS: ICD-10-CM

## 2019-01-30 DIAGNOSIS — M79.7 FIBROMYALGIA: ICD-10-CM

## 2019-01-30 PROCEDURE — 4040F PNEUMOC VAC/ADMIN/RCVD: CPT | Performed by: INTERNAL MEDICINE

## 2019-01-30 PROCEDURE — G8482 FLU IMMUNIZE ORDER/ADMIN: HCPCS | Performed by: INTERNAL MEDICINE

## 2019-01-30 PROCEDURE — G8399 PT W/DXA RESULTS DOCUMENT: HCPCS | Performed by: INTERNAL MEDICINE

## 2019-01-30 PROCEDURE — 3017F COLORECTAL CA SCREEN DOC REV: CPT | Performed by: INTERNAL MEDICINE

## 2019-01-30 PROCEDURE — 1090F PRES/ABSN URINE INCON ASSESS: CPT | Performed by: INTERNAL MEDICINE

## 2019-01-30 PROCEDURE — 1101F PT FALLS ASSESS-DOCD LE1/YR: CPT | Performed by: INTERNAL MEDICINE

## 2019-01-30 PROCEDURE — G8417 CALC BMI ABV UP PARAM F/U: HCPCS | Performed by: INTERNAL MEDICINE

## 2019-01-30 PROCEDURE — 99213 OFFICE O/P EST LOW 20 MIN: CPT | Performed by: INTERNAL MEDICINE

## 2019-01-30 PROCEDURE — 4004F PT TOBACCO SCREEN RCVD TLK: CPT | Performed by: INTERNAL MEDICINE

## 2019-01-30 PROCEDURE — 1123F ACP DISCUSS/DSCN MKR DOCD: CPT | Performed by: INTERNAL MEDICINE

## 2019-01-30 PROCEDURE — G8427 DOCREV CUR MEDS BY ELIG CLIN: HCPCS | Performed by: INTERNAL MEDICINE

## 2019-01-30 RX ORDER — DULOXETIN HYDROCHLORIDE 60 MG/1
CAPSULE, DELAYED RELEASE ORAL
Qty: 30 CAPSULE | Refills: 0 | Status: SHIPPED | OUTPATIENT
Start: 2019-01-30 | End: 2019-02-20 | Stop reason: SDUPTHER

## 2019-01-30 RX ORDER — MELOXICAM 15 MG/1
TABLET ORAL
Qty: 30 TABLET | Refills: 0 | Status: SHIPPED | OUTPATIENT
Start: 2019-01-30 | End: 2019-02-20 | Stop reason: SDUPTHER

## 2019-01-30 RX ORDER — DULOXETIN HYDROCHLORIDE 30 MG/1
CAPSULE, DELAYED RELEASE ORAL
Qty: 30 CAPSULE | Refills: 0 | Status: SHIPPED | OUTPATIENT
Start: 2019-01-30 | End: 2019-02-20 | Stop reason: SDUPTHER

## 2019-01-30 RX ORDER — GABAPENTIN 600 MG/1
600 TABLET ORAL 3 TIMES DAILY
Qty: 90 TABLET | Refills: 0 | Status: SHIPPED | OUTPATIENT
Start: 2019-01-30 | End: 2019-02-20 | Stop reason: SDUPTHER

## 2019-01-30 RX ORDER — CALCIUM CARBONATE/VITAMIN D3 500-10/5ML
LIQUID (ML) ORAL
Qty: 60 CAPSULE | Refills: 1 | Status: SHIPPED | OUTPATIENT
Start: 2019-01-30 | End: 2019-02-20 | Stop reason: SDUPTHER

## 2019-01-30 RX ORDER — TIZANIDINE 4 MG/1
TABLET ORAL
Qty: 60 TABLET | Refills: 0 | Status: SHIPPED | OUTPATIENT
Start: 2019-01-30 | End: 2019-02-20 | Stop reason: SDUPTHER

## 2019-01-30 RX ORDER — AMITRIPTYLINE HYDROCHLORIDE 25 MG/1
TABLET, FILM COATED ORAL
Qty: 30 TABLET | Refills: 0 | Status: SHIPPED | OUTPATIENT
Start: 2019-01-30 | End: 2019-02-20 | Stop reason: SDUPTHER

## 2019-01-31 ENCOUNTER — TELEPHONE (OUTPATIENT)
Dept: PAIN MANAGEMENT | Age: 74
End: 2019-01-31

## 2019-02-20 ENCOUNTER — OFFICE VISIT (OUTPATIENT)
Dept: PAIN MANAGEMENT | Age: 74
End: 2019-02-20
Payer: MEDICARE

## 2019-02-20 VITALS
SYSTOLIC BLOOD PRESSURE: 159 MMHG | WEIGHT: 147 LBS | DIASTOLIC BLOOD PRESSURE: 84 MMHG | BODY MASS INDEX: 25.23 KG/M2 | HEART RATE: 85 BPM

## 2019-02-20 DIAGNOSIS — M51.37 DEGENERATION OF LUMBAR OR LUMBOSACRAL INTERVERTEBRAL DISC: ICD-10-CM

## 2019-02-20 DIAGNOSIS — M79.7 FIBROMYALGIA: ICD-10-CM

## 2019-02-20 DIAGNOSIS — M47.26 OSTEOARTHRITIS OF SPINE WITH RADICULOPATHY, LUMBAR REGION: ICD-10-CM

## 2019-02-20 DIAGNOSIS — G89.4 CHRONIC PAIN SYNDROME: ICD-10-CM

## 2019-02-20 DIAGNOSIS — M51.36 DDD (DEGENERATIVE DISC DISEASE), LUMBAR: ICD-10-CM

## 2019-02-20 DIAGNOSIS — M47.816 LUMBAR SPONDYLOSIS: ICD-10-CM

## 2019-02-20 PROCEDURE — G8427 DOCREV CUR MEDS BY ELIG CLIN: HCPCS | Performed by: INTERNAL MEDICINE

## 2019-02-20 PROCEDURE — 4004F PT TOBACCO SCREEN RCVD TLK: CPT | Performed by: INTERNAL MEDICINE

## 2019-02-20 PROCEDURE — 4040F PNEUMOC VAC/ADMIN/RCVD: CPT | Performed by: INTERNAL MEDICINE

## 2019-02-20 PROCEDURE — 1123F ACP DISCUSS/DSCN MKR DOCD: CPT | Performed by: INTERNAL MEDICINE

## 2019-02-20 PROCEDURE — G8399 PT W/DXA RESULTS DOCUMENT: HCPCS | Performed by: INTERNAL MEDICINE

## 2019-02-20 PROCEDURE — 1101F PT FALLS ASSESS-DOCD LE1/YR: CPT | Performed by: INTERNAL MEDICINE

## 2019-02-20 PROCEDURE — G8417 CALC BMI ABV UP PARAM F/U: HCPCS | Performed by: INTERNAL MEDICINE

## 2019-02-20 PROCEDURE — 3017F COLORECTAL CA SCREEN DOC REV: CPT | Performed by: INTERNAL MEDICINE

## 2019-02-20 PROCEDURE — 99213 OFFICE O/P EST LOW 20 MIN: CPT | Performed by: INTERNAL MEDICINE

## 2019-02-20 PROCEDURE — G8482 FLU IMMUNIZE ORDER/ADMIN: HCPCS | Performed by: INTERNAL MEDICINE

## 2019-02-20 PROCEDURE — 1090F PRES/ABSN URINE INCON ASSESS: CPT | Performed by: INTERNAL MEDICINE

## 2019-02-20 RX ORDER — GABAPENTIN 600 MG/1
600 TABLET ORAL 3 TIMES DAILY
Qty: 90 TABLET | Refills: 0 | Status: SHIPPED | OUTPATIENT
Start: 2019-02-20 | End: 2019-03-20 | Stop reason: SDUPTHER

## 2019-02-20 RX ORDER — DULOXETIN HYDROCHLORIDE 30 MG/1
CAPSULE, DELAYED RELEASE ORAL
Qty: 30 CAPSULE | Refills: 0 | Status: SHIPPED | OUTPATIENT
Start: 2019-02-20 | End: 2019-03-20 | Stop reason: SDUPTHER

## 2019-02-20 RX ORDER — CALCIUM CARBONATE/VITAMIN D3 500-10/5ML
LIQUID (ML) ORAL
Qty: 60 CAPSULE | Refills: 1 | Status: SHIPPED | OUTPATIENT
Start: 2019-02-20 | End: 2019-03-20 | Stop reason: SDUPTHER

## 2019-02-20 RX ORDER — AMITRIPTYLINE HYDROCHLORIDE 25 MG/1
TABLET, FILM COATED ORAL
Qty: 30 TABLET | Refills: 0 | Status: SHIPPED | OUTPATIENT
Start: 2019-02-20 | End: 2019-03-20 | Stop reason: SDUPTHER

## 2019-02-20 RX ORDER — TIZANIDINE 4 MG/1
TABLET ORAL
Qty: 60 TABLET | Refills: 0 | Status: SHIPPED | OUTPATIENT
Start: 2019-02-20 | End: 2019-03-20 | Stop reason: SDUPTHER

## 2019-02-20 RX ORDER — MELOXICAM 15 MG/1
TABLET ORAL
Qty: 30 TABLET | Refills: 0 | Status: SHIPPED | OUTPATIENT
Start: 2019-02-20 | End: 2019-03-20 | Stop reason: SDUPTHER

## 2019-02-20 RX ORDER — DULOXETIN HYDROCHLORIDE 60 MG/1
CAPSULE, DELAYED RELEASE ORAL
Qty: 30 CAPSULE | Refills: 0 | Status: SHIPPED | OUTPATIENT
Start: 2019-02-20 | End: 2019-03-20 | Stop reason: SDUPTHER

## 2019-02-20 RX ORDER — OXYCODONE AND ACETAMINOPHEN 7.5; 325 MG/1; MG/1
1 TABLET ORAL EVERY 6 HOURS PRN
Qty: 100 TABLET | Refills: 0 | Status: SHIPPED | OUTPATIENT
Start: 2019-02-20 | End: 2019-03-20 | Stop reason: SDUPTHER

## 2019-02-26 ENCOUNTER — OFFICE VISIT (OUTPATIENT)
Dept: FAMILY MEDICINE CLINIC | Age: 74
End: 2019-02-26
Payer: MEDICARE

## 2019-02-26 VITALS
SYSTOLIC BLOOD PRESSURE: 122 MMHG | WEIGHT: 149 LBS | DIASTOLIC BLOOD PRESSURE: 80 MMHG | OXYGEN SATURATION: 97 % | HEART RATE: 72 BPM | TEMPERATURE: 98.1 F | BODY MASS INDEX: 25.58 KG/M2 | RESPIRATION RATE: 18 BRPM

## 2019-02-26 DIAGNOSIS — J44.1 COPD WITH ACUTE EXACERBATION (HCC): ICD-10-CM

## 2019-02-26 DIAGNOSIS — B96.89 ACUTE BACTERIAL SINUSITIS: Primary | ICD-10-CM

## 2019-02-26 DIAGNOSIS — J01.90 ACUTE BACTERIAL SINUSITIS: Primary | ICD-10-CM

## 2019-02-26 PROCEDURE — 1090F PRES/ABSN URINE INCON ASSESS: CPT | Performed by: NURSE PRACTITIONER

## 2019-02-26 PROCEDURE — G8417 CALC BMI ABV UP PARAM F/U: HCPCS | Performed by: NURSE PRACTITIONER

## 2019-02-26 PROCEDURE — G8926 SPIRO NO PERF OR DOC: HCPCS | Performed by: NURSE PRACTITIONER

## 2019-02-26 PROCEDURE — G8399 PT W/DXA RESULTS DOCUMENT: HCPCS | Performed by: NURSE PRACTITIONER

## 2019-02-26 PROCEDURE — 3023F SPIROM DOC REV: CPT | Performed by: NURSE PRACTITIONER

## 2019-02-26 PROCEDURE — G8427 DOCREV CUR MEDS BY ELIG CLIN: HCPCS | Performed by: NURSE PRACTITIONER

## 2019-02-26 PROCEDURE — 1123F ACP DISCUSS/DSCN MKR DOCD: CPT | Performed by: NURSE PRACTITIONER

## 2019-02-26 PROCEDURE — 4040F PNEUMOC VAC/ADMIN/RCVD: CPT | Performed by: NURSE PRACTITIONER

## 2019-02-26 PROCEDURE — G8482 FLU IMMUNIZE ORDER/ADMIN: HCPCS | Performed by: NURSE PRACTITIONER

## 2019-02-26 PROCEDURE — 3017F COLORECTAL CA SCREEN DOC REV: CPT | Performed by: NURSE PRACTITIONER

## 2019-02-26 PROCEDURE — 4004F PT TOBACCO SCREEN RCVD TLK: CPT | Performed by: NURSE PRACTITIONER

## 2019-02-26 PROCEDURE — 1101F PT FALLS ASSESS-DOCD LE1/YR: CPT | Performed by: NURSE PRACTITIONER

## 2019-02-26 PROCEDURE — 99214 OFFICE O/P EST MOD 30 MIN: CPT | Performed by: NURSE PRACTITIONER

## 2019-02-26 RX ORDER — CEFDINIR 300 MG/1
300 CAPSULE ORAL 2 TIMES DAILY
Qty: 20 CAPSULE | Refills: 0 | Status: SHIPPED | OUTPATIENT
Start: 2019-02-26 | End: 2019-03-08

## 2019-02-26 RX ORDER — PREDNISONE 20 MG/1
20 TABLET ORAL 2 TIMES DAILY
Qty: 10 TABLET | Refills: 0 | Status: SHIPPED | OUTPATIENT
Start: 2019-02-26 | End: 2019-03-03

## 2019-02-26 ASSESSMENT — ENCOUNTER SYMPTOMS
COUGH: 1
CHEST TIGHTNESS: 0
NAUSEA: 0
WHEEZING: 1
DIARRHEA: 0
SORE THROAT: 1
SINUS PAIN: 1
RHINORRHEA: 1
SINUS PRESSURE: 1
SHORTNESS OF BREATH: 1
VOMITING: 0

## 2019-03-05 ENCOUNTER — OFFICE VISIT (OUTPATIENT)
Dept: FAMILY MEDICINE CLINIC | Age: 74
End: 2019-03-05
Payer: MEDICARE

## 2019-03-05 ENCOUNTER — HOSPITAL ENCOUNTER (OUTPATIENT)
Dept: GENERAL RADIOLOGY | Age: 74
Discharge: HOME | End: 2019-03-05
Payer: MEDICARE

## 2019-03-05 ENCOUNTER — HOSPITAL ENCOUNTER (OUTPATIENT)
Age: 74
Discharge: HOME | End: 2019-03-05
Payer: MEDICARE

## 2019-03-05 VITALS
HEART RATE: 83 BPM | BODY MASS INDEX: 25.75 KG/M2 | TEMPERATURE: 98.3 F | WEIGHT: 150 LBS | DIASTOLIC BLOOD PRESSURE: 80 MMHG | SYSTOLIC BLOOD PRESSURE: 120 MMHG | RESPIRATION RATE: 18 BRPM | OXYGEN SATURATION: 99 %

## 2019-03-05 DIAGNOSIS — J44.1 COPD WITH ACUTE EXACERBATION (HCC): ICD-10-CM

## 2019-03-05 DIAGNOSIS — J44.1 COPD WITH ACUTE EXACERBATION (HCC): Primary | ICD-10-CM

## 2019-03-05 PROCEDURE — G8427 DOCREV CUR MEDS BY ELIG CLIN: HCPCS | Performed by: NURSE PRACTITIONER

## 2019-03-05 PROCEDURE — 99213 OFFICE O/P EST LOW 20 MIN: CPT | Performed by: NURSE PRACTITIONER

## 2019-03-05 PROCEDURE — 3017F COLORECTAL CA SCREEN DOC REV: CPT | Performed by: NURSE PRACTITIONER

## 2019-03-05 PROCEDURE — G8417 CALC BMI ABV UP PARAM F/U: HCPCS | Performed by: NURSE PRACTITIONER

## 2019-03-05 PROCEDURE — G8926 SPIRO NO PERF OR DOC: HCPCS | Performed by: NURSE PRACTITIONER

## 2019-03-05 PROCEDURE — 4004F PT TOBACCO SCREEN RCVD TLK: CPT | Performed by: NURSE PRACTITIONER

## 2019-03-05 PROCEDURE — G8399 PT W/DXA RESULTS DOCUMENT: HCPCS | Performed by: NURSE PRACTITIONER

## 2019-03-05 PROCEDURE — G8482 FLU IMMUNIZE ORDER/ADMIN: HCPCS | Performed by: NURSE PRACTITIONER

## 2019-03-05 PROCEDURE — 71046 X-RAY EXAM CHEST 2 VIEWS: CPT

## 2019-03-05 PROCEDURE — 1101F PT FALLS ASSESS-DOCD LE1/YR: CPT | Performed by: NURSE PRACTITIONER

## 2019-03-05 PROCEDURE — 3023F SPIROM DOC REV: CPT | Performed by: NURSE PRACTITIONER

## 2019-03-05 PROCEDURE — 4040F PNEUMOC VAC/ADMIN/RCVD: CPT | Performed by: NURSE PRACTITIONER

## 2019-03-05 PROCEDURE — 1123F ACP DISCUSS/DSCN MKR DOCD: CPT | Performed by: NURSE PRACTITIONER

## 2019-03-05 PROCEDURE — 1090F PRES/ABSN URINE INCON ASSESS: CPT | Performed by: NURSE PRACTITIONER

## 2019-03-05 RX ORDER — PREDNISONE 10 MG/1
TABLET ORAL
Qty: 33 TABLET | Refills: 0 | Status: SHIPPED | OUTPATIENT
Start: 2019-03-05 | End: 2019-03-15

## 2019-03-05 ASSESSMENT — ENCOUNTER SYMPTOMS
SHORTNESS OF BREATH: 1
RHINORRHEA: 1
SORE THROAT: 0
VOMITING: 0
COUGH: 1
NAUSEA: 0
WHEEZING: 1
CHEST TIGHTNESS: 1
DIARRHEA: 0

## 2019-03-11 ENCOUNTER — OFFICE VISIT (OUTPATIENT)
Dept: PULMONOLOGY | Age: 74
End: 2019-03-11
Payer: MEDICARE

## 2019-03-11 VITALS
DIASTOLIC BLOOD PRESSURE: 70 MMHG | SYSTOLIC BLOOD PRESSURE: 120 MMHG | OXYGEN SATURATION: 94 % | BODY MASS INDEX: 24.92 KG/M2 | HEART RATE: 70 BPM | TEMPERATURE: 98.3 F | RESPIRATION RATE: 16 BRPM | HEIGHT: 64 IN | WEIGHT: 146 LBS

## 2019-03-11 DIAGNOSIS — Z87.891 PERSONAL HISTORY OF TOBACCO USE: ICD-10-CM

## 2019-03-11 DIAGNOSIS — J44.9 COPD, SEVERE (HCC): Primary | ICD-10-CM

## 2019-03-11 DIAGNOSIS — Z72.0 TOBACCO ABUSE: ICD-10-CM

## 2019-03-11 PROCEDURE — 1090F PRES/ABSN URINE INCON ASSESS: CPT | Performed by: INTERNAL MEDICINE

## 2019-03-11 PROCEDURE — 1123F ACP DISCUSS/DSCN MKR DOCD: CPT | Performed by: INTERNAL MEDICINE

## 2019-03-11 PROCEDURE — 1101F PT FALLS ASSESS-DOCD LE1/YR: CPT | Performed by: INTERNAL MEDICINE

## 2019-03-11 PROCEDURE — 3023F SPIROM DOC REV: CPT | Performed by: INTERNAL MEDICINE

## 2019-03-11 PROCEDURE — 99213 OFFICE O/P EST LOW 20 MIN: CPT | Performed by: INTERNAL MEDICINE

## 2019-03-11 PROCEDURE — G8926 SPIRO NO PERF OR DOC: HCPCS | Performed by: INTERNAL MEDICINE

## 2019-03-11 PROCEDURE — G8399 PT W/DXA RESULTS DOCUMENT: HCPCS | Performed by: INTERNAL MEDICINE

## 2019-03-11 PROCEDURE — 4040F PNEUMOC VAC/ADMIN/RCVD: CPT | Performed by: INTERNAL MEDICINE

## 2019-03-11 PROCEDURE — G8417 CALC BMI ABV UP PARAM F/U: HCPCS | Performed by: INTERNAL MEDICINE

## 2019-03-11 PROCEDURE — G0296 VISIT TO DETERM LDCT ELIG: HCPCS | Performed by: INTERNAL MEDICINE

## 2019-03-11 PROCEDURE — G8427 DOCREV CUR MEDS BY ELIG CLIN: HCPCS | Performed by: INTERNAL MEDICINE

## 2019-03-11 PROCEDURE — 4004F PT TOBACCO SCREEN RCVD TLK: CPT | Performed by: INTERNAL MEDICINE

## 2019-03-11 PROCEDURE — G8482 FLU IMMUNIZE ORDER/ADMIN: HCPCS | Performed by: INTERNAL MEDICINE

## 2019-03-11 PROCEDURE — 3017F COLORECTAL CA SCREEN DOC REV: CPT | Performed by: INTERNAL MEDICINE

## 2019-03-11 RX ORDER — PREDNISONE 10 MG/1
TABLET ORAL
Qty: 30 TABLET | Refills: 0 | Status: SHIPPED | OUTPATIENT
Start: 2019-03-11 | End: 2019-03-21

## 2019-03-20 ENCOUNTER — OFFICE VISIT (OUTPATIENT)
Dept: PAIN MANAGEMENT | Age: 74
End: 2019-03-20
Payer: MEDICARE

## 2019-03-20 VITALS
WEIGHT: 149 LBS | BODY MASS INDEX: 25.58 KG/M2 | DIASTOLIC BLOOD PRESSURE: 68 MMHG | SYSTOLIC BLOOD PRESSURE: 109 MMHG | HEART RATE: 85 BPM

## 2019-03-20 DIAGNOSIS — M47.816 LUMBAR SPONDYLOSIS: ICD-10-CM

## 2019-03-20 DIAGNOSIS — M51.36 DDD (DEGENERATIVE DISC DISEASE), LUMBAR: ICD-10-CM

## 2019-03-20 DIAGNOSIS — M51.37 DEGENERATION OF LUMBAR OR LUMBOSACRAL INTERVERTEBRAL DISC: ICD-10-CM

## 2019-03-20 DIAGNOSIS — M79.7 FIBROMYALGIA: ICD-10-CM

## 2019-03-20 DIAGNOSIS — M47.26 OSTEOARTHRITIS OF SPINE WITH RADICULOPATHY, LUMBAR REGION: ICD-10-CM

## 2019-03-20 DIAGNOSIS — G89.4 CHRONIC PAIN SYNDROME: ICD-10-CM

## 2019-03-20 PROCEDURE — G8399 PT W/DXA RESULTS DOCUMENT: HCPCS | Performed by: INTERNAL MEDICINE

## 2019-03-20 PROCEDURE — G8417 CALC BMI ABV UP PARAM F/U: HCPCS | Performed by: INTERNAL MEDICINE

## 2019-03-20 PROCEDURE — 1101F PT FALLS ASSESS-DOCD LE1/YR: CPT | Performed by: INTERNAL MEDICINE

## 2019-03-20 PROCEDURE — 1123F ACP DISCUSS/DSCN MKR DOCD: CPT | Performed by: INTERNAL MEDICINE

## 2019-03-20 PROCEDURE — 4040F PNEUMOC VAC/ADMIN/RCVD: CPT | Performed by: INTERNAL MEDICINE

## 2019-03-20 PROCEDURE — G8427 DOCREV CUR MEDS BY ELIG CLIN: HCPCS | Performed by: INTERNAL MEDICINE

## 2019-03-20 PROCEDURE — 4004F PT TOBACCO SCREEN RCVD TLK: CPT | Performed by: INTERNAL MEDICINE

## 2019-03-20 PROCEDURE — 3017F COLORECTAL CA SCREEN DOC REV: CPT | Performed by: INTERNAL MEDICINE

## 2019-03-20 PROCEDURE — 1090F PRES/ABSN URINE INCON ASSESS: CPT | Performed by: INTERNAL MEDICINE

## 2019-03-20 PROCEDURE — G8482 FLU IMMUNIZE ORDER/ADMIN: HCPCS | Performed by: INTERNAL MEDICINE

## 2019-03-20 PROCEDURE — 99213 OFFICE O/P EST LOW 20 MIN: CPT | Performed by: INTERNAL MEDICINE

## 2019-03-20 RX ORDER — TIZANIDINE 4 MG/1
TABLET ORAL
Qty: 60 TABLET | Refills: 0 | Status: SHIPPED | OUTPATIENT
Start: 2019-03-20 | End: 2019-04-17 | Stop reason: SDUPTHER

## 2019-03-20 RX ORDER — OXYCODONE AND ACETAMINOPHEN 7.5; 325 MG/1; MG/1
1 TABLET ORAL EVERY 6 HOURS PRN
Qty: 100 TABLET | Refills: 0 | Status: SHIPPED | OUTPATIENT
Start: 2019-03-20 | End: 2019-04-17 | Stop reason: SDUPTHER

## 2019-03-20 RX ORDER — DULOXETIN HYDROCHLORIDE 30 MG/1
CAPSULE, DELAYED RELEASE ORAL
Qty: 30 CAPSULE | Refills: 0 | Status: ON HOLD | OUTPATIENT
Start: 2019-03-20 | End: 2019-03-30 | Stop reason: HOSPADM

## 2019-03-20 RX ORDER — AMITRIPTYLINE HYDROCHLORIDE 25 MG/1
TABLET, FILM COATED ORAL
Qty: 30 TABLET | Refills: 0 | Status: SHIPPED | OUTPATIENT
Start: 2019-03-20 | End: 2019-04-17 | Stop reason: SDUPTHER

## 2019-03-20 RX ORDER — CALCIUM CARBONATE/VITAMIN D3 500-10/5ML
LIQUID (ML) ORAL
Qty: 60 CAPSULE | Refills: 1 | Status: SHIPPED | OUTPATIENT
Start: 2019-03-20 | End: 2019-04-05 | Stop reason: ALTCHOICE

## 2019-03-20 RX ORDER — DULOXETIN HYDROCHLORIDE 60 MG/1
CAPSULE, DELAYED RELEASE ORAL
Qty: 30 CAPSULE | Refills: 0 | Status: SHIPPED | OUTPATIENT
Start: 2019-03-20 | End: 2019-04-17 | Stop reason: SDUPTHER

## 2019-03-20 RX ORDER — MELOXICAM 15 MG/1
TABLET ORAL
Qty: 30 TABLET | Refills: 0 | Status: SHIPPED | OUTPATIENT
Start: 2019-03-20 | End: 2019-05-15 | Stop reason: SDUPTHER

## 2019-03-20 RX ORDER — GABAPENTIN 600 MG/1
600 TABLET ORAL 3 TIMES DAILY
Qty: 90 TABLET | Refills: 0 | Status: SHIPPED | OUTPATIENT
Start: 2019-03-20 | End: 2019-04-17 | Stop reason: SDUPTHER

## 2019-03-27 ENCOUNTER — APPOINTMENT (OUTPATIENT)
Dept: GENERAL RADIOLOGY | Age: 74
DRG: 189 | End: 2019-03-27
Payer: MEDICARE

## 2019-03-27 ENCOUNTER — HOSPITAL ENCOUNTER (INPATIENT)
Age: 74
LOS: 2 days | Discharge: HOME | DRG: 189 | End: 2019-03-30
Attending: EMERGENCY MEDICINE | Admitting: INTERNAL MEDICINE
Payer: MEDICARE

## 2019-03-27 LAB
BASOPHILS ABSOLUTE: 0 K/UL (ref 0–0.2)
BASOPHILS RELATIVE PERCENT: 0.3 %
EOSINOPHILS ABSOLUTE: 0 K/UL (ref 0–0.6)
EOSINOPHILS RELATIVE PERCENT: 0 %
HCT VFR BLD CALC: 38.5 % (ref 36–48)
HEMOGLOBIN: 12.8 G/DL (ref 12–16)
LYMPHOCYTES ABSOLUTE: 0.4 K/UL (ref 1–5.1)
LYMPHOCYTES RELATIVE PERCENT: 4.4 %
MCH RBC QN AUTO: 31.9 PG (ref 26–34)
MCHC RBC AUTO-ENTMCNC: 33.2 G/DL (ref 31–36)
MCV RBC AUTO: 96.1 FL (ref 80–100)
MONOCYTES ABSOLUTE: 0.5 K/UL (ref 0–1.3)
MONOCYTES RELATIVE PERCENT: 5.2 %
NEUTROPHILS ABSOLUTE: 9 K/UL (ref 1.7–7.7)
NEUTROPHILS RELATIVE PERCENT: 90.1 %
PDW BLD-RTO: 15.3 % (ref 12.4–15.4)
PLATELET # BLD: 265 K/UL (ref 135–450)
PMV BLD AUTO: 6.9 FL (ref 5–10.5)
RBC # BLD: 4.01 M/UL (ref 4–5.2)
WBC # BLD: 10 K/UL (ref 4–11)

## 2019-03-27 PROCEDURE — 94762 N-INVAS EAR/PLS OXIMTRY CONT: CPT

## 2019-03-27 PROCEDURE — 94664 DEMO&/EVAL PT USE INHALER: CPT

## 2019-03-27 PROCEDURE — 99285 EMERGENCY DEPT VISIT HI MDM: CPT

## 2019-03-27 PROCEDURE — 85025 COMPLETE CBC W/AUTO DIFF WBC: CPT

## 2019-03-27 PROCEDURE — 80053 COMPREHEN METABOLIC PANEL: CPT

## 2019-03-27 PROCEDURE — 94640 AIRWAY INHALATION TREATMENT: CPT

## 2019-03-27 PROCEDURE — 6370000000 HC RX 637 (ALT 250 FOR IP): Performed by: EMERGENCY MEDICINE

## 2019-03-27 PROCEDURE — 93005 ELECTROCARDIOGRAM TRACING: CPT | Performed by: EMERGENCY MEDICINE

## 2019-03-27 PROCEDURE — 71046 X-RAY EXAM CHEST 2 VIEWS: CPT

## 2019-03-27 PROCEDURE — 84484 ASSAY OF TROPONIN QUANT: CPT

## 2019-03-27 PROCEDURE — 2700000000 HC OXYGEN THERAPY PER DAY

## 2019-03-27 RX ORDER — IPRATROPIUM BROMIDE AND ALBUTEROL SULFATE 2.5; .5 MG/3ML; MG/3ML
3 SOLUTION RESPIRATORY (INHALATION) ONCE
Status: COMPLETED | OUTPATIENT
Start: 2019-03-27 | End: 2019-03-27

## 2019-03-27 RX ORDER — PREDNISONE 20 MG/1
20 TABLET ORAL ONCE
Status: COMPLETED | OUTPATIENT
Start: 2019-03-27 | End: 2019-03-28

## 2019-03-27 RX ADMIN — IPRATROPIUM BROMIDE AND ALBUTEROL SULFATE 3 AMPULE: .5; 3 SOLUTION RESPIRATORY (INHALATION) at 23:41

## 2019-03-27 ASSESSMENT — PAIN SCALES - GENERAL
PAINLEVEL_OUTOF10: 5
PAINLEVEL_OUTOF10: 5

## 2019-03-27 ASSESSMENT — PAIN DESCRIPTION - PAIN TYPE: TYPE: CHRONIC PAIN

## 2019-03-27 ASSESSMENT — PAIN DESCRIPTION - ORIENTATION: ORIENTATION: MID

## 2019-03-27 ASSESSMENT — PAIN DESCRIPTION - ONSET: ONSET: ON-GOING

## 2019-03-27 ASSESSMENT — PAIN DESCRIPTION - LOCATION: LOCATION: BACK

## 2019-03-27 ASSESSMENT — PAIN - FUNCTIONAL ASSESSMENT: PAIN_FUNCTIONAL_ASSESSMENT: PREVENTS OR INTERFERES SOME ACTIVE ACTIVITIES AND ADLS

## 2019-03-27 ASSESSMENT — PAIN DESCRIPTION - FREQUENCY: FREQUENCY: CONTINUOUS

## 2019-03-27 ASSESSMENT — PAIN DESCRIPTION - DESCRIPTORS: DESCRIPTORS: ACHING

## 2019-03-28 PROBLEM — J44.1 COPD EXACERBATION (HCC): Status: ACTIVE | Noted: 2019-03-28

## 2019-03-28 LAB
A/G RATIO: 1.4 (ref 1.1–2.2)
ALBUMIN SERPL-MCNC: 3.8 G/DL (ref 3.4–5)
ALP BLD-CCNC: 72 U/L (ref 40–129)
ALT SERPL-CCNC: 18 U/L (ref 10–40)
ANION GAP SERPL CALCULATED.3IONS-SCNC: 11 MMOL/L (ref 3–16)
AST SERPL-CCNC: 17 U/L (ref 15–37)
BILIRUB SERPL-MCNC: 0.4 MG/DL (ref 0–1)
BUN BLDV-MCNC: 16 MG/DL (ref 7–20)
CALCIUM SERPL-MCNC: 8.6 MG/DL (ref 8.3–10.6)
CHLORIDE BLD-SCNC: 103 MMOL/L (ref 99–110)
CO2: 22 MMOL/L (ref 21–32)
CREAT SERPL-MCNC: 0.9 MG/DL (ref 0.6–1.2)
EKG ATRIAL RATE: 82 BPM
EKG DIAGNOSIS: NORMAL
EKG P AXIS: 50 DEGREES
EKG P-R INTERVAL: 146 MS
EKG Q-T INTERVAL: 390 MS
EKG QRS DURATION: 80 MS
EKG QTC CALCULATION (BAZETT): 455 MS
EKG R AXIS: 36 DEGREES
EKG T AXIS: 39 DEGREES
EKG VENTRICULAR RATE: 82 BPM
GFR AFRICAN AMERICAN: >60
GFR NON-AFRICAN AMERICAN: >60
GLOBULIN: 2.8 G/DL
GLUCOSE BLD-MCNC: 152 MG/DL (ref 70–99)
POTASSIUM SERPL-SCNC: 4.4 MMOL/L (ref 3.5–5.1)
SODIUM BLD-SCNC: 136 MMOL/L (ref 136–145)
TOTAL PROTEIN: 6.6 G/DL (ref 6.4–8.2)
TROPONIN: <0.01 NG/ML

## 2019-03-28 PROCEDURE — 93010 ELECTROCARDIOGRAM REPORT: CPT | Performed by: INTERNAL MEDICINE

## 2019-03-28 PROCEDURE — 2700000000 HC OXYGEN THERAPY PER DAY

## 2019-03-28 PROCEDURE — 2580000003 HC RX 258: Performed by: INTERNAL MEDICINE

## 2019-03-28 PROCEDURE — 6370000000 HC RX 637 (ALT 250 FOR IP): Performed by: INTERNAL MEDICINE

## 2019-03-28 PROCEDURE — 94664 DEMO&/EVAL PT USE INHALER: CPT

## 2019-03-28 PROCEDURE — 1200000000 HC SEMI PRIVATE

## 2019-03-28 PROCEDURE — 94761 N-INVAS EAR/PLS OXIMETRY MLT: CPT

## 2019-03-28 PROCEDURE — 94640 AIRWAY INHALATION TREATMENT: CPT

## 2019-03-28 PROCEDURE — 6360000002 HC RX W HCPCS: Performed by: INTERNAL MEDICINE

## 2019-03-28 PROCEDURE — 6370000000 HC RX 637 (ALT 250 FOR IP): Performed by: EMERGENCY MEDICINE

## 2019-03-28 RX ORDER — SODIUM CHLORIDE 0.9 % (FLUSH) 0.9 %
10 SYRINGE (ML) INJECTION PRN
Status: DISCONTINUED | OUTPATIENT
Start: 2019-03-28 | End: 2019-03-30 | Stop reason: HOSPADM

## 2019-03-28 RX ORDER — AMITRIPTYLINE HYDROCHLORIDE 25 MG/1
25 TABLET, FILM COATED ORAL NIGHTLY PRN
Status: DISCONTINUED | OUTPATIENT
Start: 2019-03-28 | End: 2019-03-30 | Stop reason: HOSPADM

## 2019-03-28 RX ORDER — SODIUM CHLORIDE 0.9 % (FLUSH) 0.9 %
10 SYRINGE (ML) INJECTION EVERY 12 HOURS SCHEDULED
Status: DISCONTINUED | OUTPATIENT
Start: 2019-03-28 | End: 2019-03-30 | Stop reason: HOSPADM

## 2019-03-28 RX ORDER — OXYCODONE AND ACETAMINOPHEN 7.5; 325 MG/1; MG/1
1 TABLET ORAL EVERY 6 HOURS PRN
Status: DISCONTINUED | OUTPATIENT
Start: 2019-03-28 | End: 2019-03-30 | Stop reason: HOSPADM

## 2019-03-28 RX ORDER — LEVOFLOXACIN 500 MG/1
500 TABLET, FILM COATED ORAL DAILY
Status: DISCONTINUED | OUTPATIENT
Start: 2019-03-28 | End: 2019-03-30 | Stop reason: HOSPADM

## 2019-03-28 RX ORDER — IPRATROPIUM BROMIDE AND ALBUTEROL SULFATE 2.5; .5 MG/3ML; MG/3ML
1 SOLUTION RESPIRATORY (INHALATION)
Status: DISCONTINUED | OUTPATIENT
Start: 2019-03-28 | End: 2019-03-30 | Stop reason: HOSPADM

## 2019-03-28 RX ORDER — ONDANSETRON 2 MG/ML
4 INJECTION INTRAMUSCULAR; INTRAVENOUS EVERY 6 HOURS PRN
Status: DISCONTINUED | OUTPATIENT
Start: 2019-03-28 | End: 2019-03-30 | Stop reason: HOSPADM

## 2019-03-28 RX ORDER — AZITHROMYCIN 500 MG/1
500 TABLET, FILM COATED ORAL ONCE
Status: COMPLETED | OUTPATIENT
Start: 2019-03-28 | End: 2019-03-28

## 2019-03-28 RX ORDER — ACETAMINOPHEN 325 MG/1
650 TABLET ORAL EVERY 4 HOURS PRN
Status: DISCONTINUED | OUTPATIENT
Start: 2019-03-28 | End: 2019-03-30 | Stop reason: HOSPADM

## 2019-03-28 RX ORDER — NICOTINE 21 MG/24HR
1 PATCH, TRANSDERMAL 24 HOURS TRANSDERMAL DAILY
Status: DISCONTINUED | OUTPATIENT
Start: 2019-03-28 | End: 2019-03-30 | Stop reason: HOSPADM

## 2019-03-28 RX ORDER — ATORVASTATIN CALCIUM 80 MG/1
80 TABLET, FILM COATED ORAL DAILY
Status: DISCONTINUED | OUTPATIENT
Start: 2019-03-28 | End: 2019-03-30 | Stop reason: HOSPADM

## 2019-03-28 RX ORDER — PANTOPRAZOLE SODIUM 20 MG/1
20 TABLET, DELAYED RELEASE ORAL DAILY
Status: DISCONTINUED | OUTPATIENT
Start: 2019-03-28 | End: 2019-03-30 | Stop reason: HOSPADM

## 2019-03-28 RX ORDER — METHYLPREDNISOLONE SODIUM SUCCINATE 40 MG/ML
40 INJECTION, POWDER, LYOPHILIZED, FOR SOLUTION INTRAMUSCULAR; INTRAVENOUS EVERY 6 HOURS
Status: DISCONTINUED | OUTPATIENT
Start: 2019-03-28 | End: 2019-03-29

## 2019-03-28 RX ORDER — GABAPENTIN 300 MG/1
600 CAPSULE ORAL 3 TIMES DAILY
Status: DISCONTINUED | OUTPATIENT
Start: 2019-03-28 | End: 2019-03-30 | Stop reason: HOSPADM

## 2019-03-28 RX ORDER — DULOXETIN HYDROCHLORIDE 30 MG/1
30 CAPSULE, DELAYED RELEASE ORAL DAILY
Status: DISCONTINUED | OUTPATIENT
Start: 2019-03-28 | End: 2019-03-30 | Stop reason: HOSPADM

## 2019-03-28 RX ORDER — IPRATROPIUM BROMIDE AND ALBUTEROL SULFATE 2.5; .5 MG/3ML; MG/3ML
1 SOLUTION RESPIRATORY (INHALATION)
Status: DISCONTINUED | OUTPATIENT
Start: 2019-03-28 | End: 2019-03-28

## 2019-03-28 RX ORDER — PREDNISONE 20 MG/1
40 TABLET ORAL DAILY
Status: DISCONTINUED | OUTPATIENT
Start: 2019-03-30 | End: 2019-03-29

## 2019-03-28 RX ADMIN — METHYLPREDNISOLONE SODIUM SUCCINATE 40 MG: 40 INJECTION, POWDER, FOR SOLUTION INTRAMUSCULAR; INTRAVENOUS at 13:55

## 2019-03-28 RX ADMIN — METHYLPREDNISOLONE SODIUM SUCCINATE 40 MG: 40 INJECTION, POWDER, FOR SOLUTION INTRAMUSCULAR; INTRAVENOUS at 08:49

## 2019-03-28 RX ADMIN — Medication 10 ML: at 20:05

## 2019-03-28 RX ADMIN — IPRATROPIUM BROMIDE AND ALBUTEROL SULFATE 1 AMPULE: .5; 3 SOLUTION RESPIRATORY (INHALATION) at 21:01

## 2019-03-28 RX ADMIN — GABAPENTIN 600 MG: 300 CAPSULE ORAL at 13:55

## 2019-03-28 RX ADMIN — OXYCODONE HYDROCHLORIDE AND ACETAMINOPHEN 1 TABLET: 7.5; 325 TABLET ORAL at 13:56

## 2019-03-28 RX ADMIN — OXYCODONE HYDROCHLORIDE AND ACETAMINOPHEN 1 TABLET: 7.5; 325 TABLET ORAL at 22:01

## 2019-03-28 RX ADMIN — IPRATROPIUM BROMIDE AND ALBUTEROL SULFATE 1 AMPULE: .5; 3 SOLUTION RESPIRATORY (INHALATION) at 16:00

## 2019-03-28 RX ADMIN — IPRATROPIUM BROMIDE AND ALBUTEROL SULFATE 1 AMPULE: .5; 3 SOLUTION RESPIRATORY (INHALATION) at 11:18

## 2019-03-28 RX ADMIN — OXYCODONE HYDROCHLORIDE AND ACETAMINOPHEN 1 TABLET: 7.5; 325 TABLET ORAL at 02:58

## 2019-03-28 RX ADMIN — ATORVASTATIN CALCIUM 80 MG: 80 TABLET, FILM COATED ORAL at 08:49

## 2019-03-28 RX ADMIN — AZITHROMYCIN 500 MG: 500 TABLET, FILM COATED ORAL at 01:11

## 2019-03-28 RX ADMIN — GABAPENTIN 600 MG: 300 CAPSULE ORAL at 08:49

## 2019-03-28 RX ADMIN — PANTOPRAZOLE SODIUM 20 MG: 20 TABLET, DELAYED RELEASE ORAL at 06:23

## 2019-03-28 RX ADMIN — AMITRIPTYLINE HYDROCHLORIDE 25 MG: 25 TABLET, FILM COATED ORAL at 02:58

## 2019-03-28 RX ADMIN — Medication 10 ML: at 08:51

## 2019-03-28 RX ADMIN — ENOXAPARIN SODIUM 40 MG: 40 INJECTION SUBCUTANEOUS at 08:49

## 2019-03-28 RX ADMIN — IPRATROPIUM BROMIDE AND ALBUTEROL SULFATE 1 AMPULE: .5; 3 SOLUTION RESPIRATORY (INHALATION) at 07:59

## 2019-03-28 RX ADMIN — METHYLPREDNISOLONE SODIUM SUCCINATE 40 MG: 40 INJECTION, POWDER, FOR SOLUTION INTRAMUSCULAR; INTRAVENOUS at 02:59

## 2019-03-28 RX ADMIN — DULOXETINE HYDROCHLORIDE 30 MG: 30 CAPSULE, DELAYED RELEASE ORAL at 08:49

## 2019-03-28 RX ADMIN — LEVOFLOXACIN 500 MG: 500 TABLET, FILM COATED ORAL at 08:49

## 2019-03-28 RX ADMIN — GABAPENTIN 600 MG: 300 CAPSULE ORAL at 20:04

## 2019-03-28 RX ADMIN — METHYLPREDNISOLONE SODIUM SUCCINATE 40 MG: 40 INJECTION, POWDER, FOR SOLUTION INTRAMUSCULAR; INTRAVENOUS at 20:04

## 2019-03-28 RX ADMIN — PREDNISONE 20 MG: 20 TABLET ORAL at 00:23

## 2019-03-28 ASSESSMENT — PAIN SCALES - GENERAL
PAINLEVEL_OUTOF10: 6
PAINLEVEL_OUTOF10: 6
PAINLEVEL_OUTOF10: 2
PAINLEVEL_OUTOF10: 3
PAINLEVEL_OUTOF10: 0
PAINLEVEL_OUTOF10: 3
PAINLEVEL_OUTOF10: 0
PAINLEVEL_OUTOF10: 0
PAINLEVEL_OUTOF10: 6
PAINLEVEL_OUTOF10: 2

## 2019-03-28 ASSESSMENT — PAIN DESCRIPTION - LOCATION
LOCATION: BACK

## 2019-03-28 ASSESSMENT — PAIN DESCRIPTION - ORIENTATION
ORIENTATION: MID

## 2019-03-28 ASSESSMENT — PAIN DESCRIPTION - PROGRESSION
CLINICAL_PROGRESSION: NOT CHANGED
CLINICAL_PROGRESSION: NOT CHANGED
CLINICAL_PROGRESSION: GRADUALLY IMPROVING

## 2019-03-28 ASSESSMENT — PAIN DESCRIPTION - FREQUENCY
FREQUENCY: INTERMITTENT
FREQUENCY: CONTINUOUS
FREQUENCY: CONTINUOUS

## 2019-03-28 ASSESSMENT — PAIN DESCRIPTION - PAIN TYPE
TYPE: CHRONIC PAIN

## 2019-03-28 ASSESSMENT — PAIN DESCRIPTION - DESCRIPTORS
DESCRIPTORS: ACHING

## 2019-03-28 ASSESSMENT — PAIN DESCRIPTION - ONSET
ONSET: ON-GOING
ONSET: ON-GOING
ONSET: AWAKENED FROM SLEEP

## 2019-03-28 ASSESSMENT — PAIN - FUNCTIONAL ASSESSMENT
PAIN_FUNCTIONAL_ASSESSMENT: PREVENTS OR INTERFERES SOME ACTIVE ACTIVITIES AND ADLS
PAIN_FUNCTIONAL_ASSESSMENT: ACTIVITIES ARE NOT PREVENTED
PAIN_FUNCTIONAL_ASSESSMENT: ACTIVITIES ARE NOT PREVENTED

## 2019-03-28 NOTE — PLAN OF CARE
Problem: Pain:  Goal: Pain level will decrease  Description  Pain level will decrease  3/28/2019 1136 by Roger Pitt RN  Outcome: Ongoing  3/28/2019 0458 by Leopoldo Brady, RN  Outcome: Ongoing  3/28/2019 0314 by Leopoldo Brady, RN  Outcome: Ongoing  Goal: Control of acute pain  Description  Control of acute pain  3/28/2019 1136 by Roger Pitt RN  Outcome: Ongoing  3/28/2019 0458 by Leopoldo Brady, RN  Outcome: Ongoing  3/28/2019 0314 by Leopoldo Brady, RN  Outcome: Ongoing  Patient denies any pain at this time. Will continue to monitor.    Goal: Control of chronic pain  Description  Control of chronic pain  3/28/2019 1136 by Roger Pitt RN  Outcome: Ongoing  3/28/2019 0458 by Leopoldo Brady, RN  Outcome: Ongoing  3/28/2019 0314 by Leopoldo Brady, RN  Outcome: Ongoing     Problem: Falls - Risk of:  Goal: Will remain free from falls  Description  Will remain free from falls  3/28/2019 1136 by Roger Pitt RN  Outcome: Ongoing  3/28/2019 0458 by Leopoldo Brady, RN  Outcome: Ongoing  3/28/2019 0314 by Leopoldo Brady, RN  Outcome: Ongoing  Goal: Absence of physical injury  Description  Absence of physical injury  3/28/2019 1136 by Roger Pitt RN  Outcome: Ongoing  3/28/2019 0458 by Leopoldo Brady, RN  Outcome: Ongoing  3/28/2019 0314 by Leopoldo Brady, RN  Outcome: Ongoing

## 2019-03-28 NOTE — PROGRESS NOTES
4 Eyes Skin Assessment     The patient is being assess for  Admission    I agree that 2 RN's have performed a thorough Head to Toe Skin Assessment on the patient. ALL assessment sites listed below have been assessed. Areas assessed by both nurses:   [x]   Head, Face, and Ears   [x]   Shoulders, Back, and Chest  [x]   Arms, Elbows, and Hands   [x]   Coccyx, Sacrum, and IschIum  [x]   Legs, Feet, and Heels        Does the Patient have Skin Breakdown?   No         Uriel Prevention initiated:  No   Wound Care Orders initiated:  NA      [facility] nurse consulted for Pressure Injury (Stage 3,4, Unstageable, DTI, NWPT, and Complex wounds), New and Established Ostomies:  NA      Nurse 1 eSignature:Electronically signed by Malcolm Brush RN on 3/28/19 at 4:57 AM      **SHARE this note so that the co-signing nurse is able to place an eSignature**    Nurse 2 eSignature: {Esignature:[id number]}

## 2019-03-28 NOTE — H&P
Hospital Medicine History & Physical      PCP: HECTOR Gallagher CNP    Date of Admission: 3/27/2019    Date of Service: Pt seen/examined on 3/28/2019 and Admitted to Inpatient with expected LOS greater than two midnights due to medical therapy. Chief Complaint:  Shortness of breath      History Of Present Illness:      72 y.o. female who presented to [facility] [place] with increasing shortness of breath. Patient states that she's had intermittent respiratory symptoms for the last several weeks. She's been treated with a course of steroids and antibiotics which cause some improvement in her symptoms but then she has worsened. She states she's been used inhalers and has been unable to catch her breath at home. She decided to come to the hospital for care. She states cough is occasionally productive. She denies fevers chills nausea vomiting. She does have some frontal headaches and pain around her sinuses. Past Medical History:          Diagnosis Date    Chronic pain syndrome     Colorectal polyps     COPD [facility]chronic obstructive pulmonary disease) ([facility])     CTS (carpal tunnel syndrome)     BILATERAL    DDD (degenerative disc disease), lumbar     Depression     Family hx of colon cancer     Fibromyalgia     Hyperlipemia     IBS (irritable bowel syndrome)     Lumbar spondylosis     Urticaria, chronic        Past Surgical History:          Procedure Laterality Date    CERVICAL POLYP REMOVAL  9/2004    TYMPANOSTOMY TUBE PLACEMENT         Medications Prior to Admission:      Prior to Admission medications    Medication Sig Start Date End Date Taking?  Authorizing Provider   tiZANidine (ZANAFLEX) 4 MG tablet Take one tablet po BID 3/20/19  Yes Juanita Metz MD   DULoxetine (CYMBALTA) 30 MG extended release capsule Take 1 tablet po in the am 3/20/19  Yes Juanita Metz MD   DULoxetine (CYMBALTA) 60 MG extended release capsule Take one tablet at night 3/20/19  Yes Donal Velazquez MD Haylee   Magnesium Oxide 400 MG CAPS Take 1 tablet by mouth BID 3/20/19  Yes Christine Acharya MD   amitriptyline (ELAVIL) 25 MG tablet TAKE 1 TABLET BY MOUTH NIGHTLY 3/20/19  Yes Christine Acharya MD   gabapentin (NEURONTIN) 600 MG tablet Take 1 tablet by mouth 3 times daily for 30 days. 3/20/19 4/19/19 Yes Christine Acharya MD   meloxicam (MOBIC) 15 MG tablet TAKE 1 TABLET BY MOUTH DAILY 3/20/19  Yes Christine Acharya MD   oxyCODONE-acetaminophen (PERCOCET) 7.5-325 MG per tablet Take 1 tablet by mouth every 6 hours as needed for Pain for up to 28 days. Max 3-4 a day 3/20/19 4/17/19 Yes Christine Acharya MD   pantoprazole (PROTONIX) 20 MG tablet TAKE 1 TABLET BY MOUTH DAILY 1/29/19  Yes HECTOR Mann CNP   atorvastatin (LIPITOR) 80 MG tablet TAKE 1 TABLET BY MOUTH EVERY DAY FOR CHOLESTEROL 1/29/19  Yes HECTOR Mann CNP   BREO ELLIPTA 200-25 MCG/INH AEPB INHALE 1 PUFF INTO THE LUNGS DAILY 1/2/19  Yes Everardo Aguilar MD   albuterol sulfate  (90 Base) MCG/ACT inhaler Inhale 2 puffs into the lungs every 6 hours as needed for Shortness of Breath 5/2/18  Yes Everardo Aguilar MD   vitamin D (ERGOCALCIFEROL) 06098 units CAPS capsule TAKE 1 CAPSULE BY MOUTH TWICE WEEKLY 8/28/17  Yes HECTOR Mann CNP   calcium carbonate 600 MG TABS tablet Take 1 tablet by mouth 2 times daily   Yes Historical Provider, MD       Allergies:  Patient has no known allergies. Social History:      The patient currently lives w family    TOBACCO:   reports that she has been smoking cigarettes. She started smoking about 57 years ago. She has a 82.50 pack-year smoking history. She has never used smokeless tobacco.  ETOH:   reports that she does not drink alcohol. Family History:      Reviewed in detail and negative for DM, CAD, Cancer, CVA.  Positive as follows:        Problem Relation Age of Onset    Cancer Father         COLON     Alzheimer's Disease Mother     Heart Failure Brother    

## 2019-03-28 NOTE — ED PROVIDER NOTES
Emergency Physician Note    Chief Complaint  Shortness of Breath (Pt to ED with c/o sob and cough x2 days, worsened today. States O2 sats at home were in the mid 80's today, o2 88-90% ra in ED. Hx COPD. 2L o2/nc placed with sats increasing to 94%.) and Cough       History of Present Illness  Mary Stone is a 72 y.o. female who presents to the ED for cough and shortness of breath. Patient reports that she's had an upper respiratory infection she believes for about a month that will not go away. Over the last 2 days though she's had worsening shortness of breath and productive cough of mostly clear sputum. She saw her primary physician and was given prednisone 40 mg which she took earlier today. She has no oxygen at home and when she checked her oxygen at home and was in the mid to low 80s. She denies any other acute complaints. She specifically denies vomiting or diarrhea. She complains of some congestion and cough but no chest pain. 10 systems reviewed, pertinent positives per HPI otherwise noted to be negative    I have reviewed the following from the nursing documentation:      Prior to Admission medications    Medication Sig Start Date End Date Taking? Authorizing Provider   tiZANidine (ZANAFLEX) 4 MG tablet Take one tablet po BID 3/20/19   Sam See MD   DULoxetine (CYMBALTA) 30 MG extended release capsule Take 1 tablet po in the am 3/20/19   Sam See MD   DULoxetine (CYMBALTA) 60 MG extended release capsule Take one tablet at night 3/20/19   Sam See MD   Magnesium Oxide 400 MG CAPS Take 1 tablet by mouth BID 3/20/19   Sam See MD   amitriptyline (ELAVIL) 25 MG tablet TAKE 1 TABLET BY MOUTH NIGHTLY 3/20/19   Sam See MD   gabapentin (NEURONTIN) 600 MG tablet Take 1 tablet by mouth 3 times daily for 30 days.  3/20/19 4/19/19  Sam See MD   meloxicam (MOBIC) 15 MG tablet TAKE 1 TABLET BY MOUTH DAILY 3/20/19   Sam See MD   oxyCODONE-acetaminophen (PERCOCET) 7.5-325 MG per tablet Take 1 tablet by mouth every 6 hours as needed for Pain for up to 28 days.  Max 3-4 a day 3/20/19 4/17/19  Flavio Chu MD   pantoprazole (PROTONIX) 20 MG tablet TAKE 1 TABLET BY MOUTH DAILY 1/29/19   HECTOR De Guzman CNP   atorvastatin (LIPITOR) 80 MG tablet TAKE 1 TABLET BY MOUTH EVERY DAY FOR CHOLESTEROL 1/29/19   HECTOR De Guzman CNP   BREO ELLIPTA 200-25 MCG/INH AEPB INHALE 1 PUFF INTO THE LUNGS DAILY 1/2/19   Michele Roberson MD   albuterol sulfate  (90 Base) MCG/ACT inhaler Inhale 2 puffs into the lungs every 6 hours as needed for Shortness of Breath 5/2/18   Michele Roberson MD   vitamin D (ERGOCALCIFEROL) 26650 units CAPS capsule TAKE 1 CAPSULE BY MOUTH TWICE WEEKLY 8/28/17   HECTOR De Guzman CNP   calcium carbonate 600 MG TABS tablet Take 1 tablet by mouth 2 times daily    Historical Provider, MD       Allergies as of 03/27/2019    (No Known Allergies)       Past Medical History:   Diagnosis Date    Chronic pain syndrome     Colorectal polyps     COPD (chronic obstructive pulmonary disease) (HCC)     CTS (carpal tunnel syndrome)     BILATERAL    DDD (degenerative disc disease), lumbar     Depression     Family hx of colon cancer     Fibromyalgia     Hyperlipemia     IBS (irritable bowel syndrome)     Lumbar spondylosis     Urticaria, chronic         Surgical History:   Past Surgical History:   Procedure Laterality Date    CERVICAL POLYP REMOVAL  9/2004    TYMPANOSTOMY TUBE PLACEMENT          Family History:    Family History   Problem Relation Age of Onset    Cancer Father         COLON     Alzheimer's Disease Mother     Heart Failure Brother     Diabetes Daughter        Social History     Socioeconomic History    Marital status: Single     Spouse name: Not on file    Number of children: 4    Years of education: Not on file    Highest education level: Not on file   Occupational History    Occupation: retired---[employer]   Social Needs    Financial resource strain: Not on file    Food insecurity:     Worry: Not on file     Inability: Not on file    Transportation needs:     Medical: Not on file     Non-medical: Not on file   Tobacco Use    Smoking status: Current Every Day Smoker     Packs/day: 1.50     Years: 55.00     Pack years: 82.50     Types: Cigarettes     Start date: 1/1/1962    Smokeless tobacco: Never Used    Tobacco comment: thinking of quitting   Substance and Sexual Activity    Alcohol use: No     Alcohol/week: 0.0 oz    Drug use: No    Sexual activity: Yes     Partners: Male   Lifestyle    Physical activity:     Days per week: Not on file     Minutes per session: Not on file    Stress: Not on file   Relationships    Social connections:     Talks on phone: Not on file     Gets together: Not on file     Attends Quaker service: Not on file     Active member of club or organization: Not on file     Attends meetings of clubs or organizations: Not on file     Relationship status: Not on file    Intimate partner violence:     Fear of current or ex partner: Not on file     Emotionally abused: Not on file     Physically abused: Not on file     Forced sexual activity: Not on file   Other Topics Concern    Not on file   Social History Narrative    Not on file       Nursing notes reviewed. ED Triage Vitals [03/27/19 2223]   Enc Vitals Group      BP 98/60      Pulse 92      Resp 24      Temp 97.9 °F (36.6 °C)      Temp Source Oral      SpO2 (!) 88 %      Weight 147 lb 11.3 oz (67 kg)      Height 5' 4\" (1.626 m)      Head Circumference       Peak Flow       Pain Score       Pain Loc       Pain Edu? Excl. in [address]? GENERAL:  Awake, alert. Well developed, well nourished with no apparent distress. HENT:  Normocephalic, Atraumatic, moist mucous membranes.   EYES:  Pupils equal round and reactive to light, Conjunctiva normal, extraocular movements normal.  NECK:  No meningeal signs, Supple. CHEST:  Regular rate and rhythm, chest wall non-tender. LUNGS:  Diffuse wheezing and rhonchi bilaterally, no respiratory distress. ABDOMEN:  Soft, non-tender, no rebound, rigidity or guarding, non-distended, normal bowel sounds. No costovertebral angle tenderness to palpation. EXTREMITIES:  Normal range of motion, no edema, no tenderness, no deformity, distal pulses present. BACK:  No tenderness. SKIN: Warm, dry and intact. NEUROLOGIC: Normal mental status. Moving all extremities to command. LABS and DIAGNOSTIC RESULTS  EKG  The Ekg interpreted by me shows  normal sinus rhythm with a rate of 82  Axis is   Normal  QTc is  normal  Intervals and Durations are unremarkable. ST Segments: normal  Delta waves, Brugada Syndrome, and Short AL are not present. No significant change from prior EKG dated 1/10/17    RADIOLOGY  X-RAYS:  I have reviewed radiologic plain film image(s). ALL OTHER NON-PLAIN FILM IMAGES SUCH AS CT, ULTRASOUND AND MRI HAVE BEEN READ BY THE RADIOLOGIST. XR CHEST STANDARD (2 VW)   Final Result   Emphysema. No acute disease.               LABS  Labs Reviewed   CBC WITH AUTO DIFFERENTIAL - Abnormal; Notable for the following components:       Result Value    Neutrophils # 9.0 (*)     Lymphocytes # 0.4 (*)     All other components within normal limits    Narrative:     Performed at:  [facility]  [address] [place] [address]   Phone [phone]   COMPREHENSIVE METABOLIC PANEL - Abnormal; Notable for the following components:    Glucose 152 (*)     All other components within normal limits    Narrative:     Performed at:  [facility]  [address] [facility] [facility] [place] [address]   Phone [phone]   TROPONIN    Narrative:     Performed at:  [facility]  [address] [place] [address]   Phone [phone]     MEDICAL DECISION MAKING     The

## 2019-03-29 LAB
ALBUMIN SERPL-MCNC: 3.7 G/DL (ref 3.4–5)
ANION GAP SERPL CALCULATED.3IONS-SCNC: 13 MMOL/L (ref 3–16)
BASOPHILS ABSOLUTE: 0 K/UL (ref 0–0.2)
BASOPHILS RELATIVE PERCENT: 0 %
BUN BLDV-MCNC: 21 MG/DL (ref 7–20)
CALCIUM SERPL-MCNC: 9 MG/DL (ref 8.3–10.6)
CHLORIDE BLD-SCNC: 99 MMOL/L (ref 99–110)
CO2: 24 MMOL/L (ref 21–32)
CREAT SERPL-MCNC: 0.8 MG/DL (ref 0.6–1.2)
EOSINOPHILS ABSOLUTE: 0 K/UL (ref 0–0.6)
EOSINOPHILS RELATIVE PERCENT: 0 %
GFR AFRICAN AMERICAN: >60
GFR NON-AFRICAN AMERICAN: >60
GLUCOSE BLD-MCNC: 137 MG/DL (ref 70–99)
HCT VFR BLD CALC: 38.8 % (ref 36–48)
HEMOGLOBIN: 12.8 G/DL (ref 12–16)
LYMPHOCYTES ABSOLUTE: 0.6 K/UL (ref 1–5.1)
LYMPHOCYTES RELATIVE PERCENT: 3.9 %
MCH RBC QN AUTO: 31.2 PG (ref 26–34)
MCHC RBC AUTO-ENTMCNC: 33 G/DL (ref 31–36)
MCV RBC AUTO: 94.7 FL (ref 80–100)
MONOCYTES ABSOLUTE: 0.7 K/UL (ref 0–1.3)
MONOCYTES RELATIVE PERCENT: 4.8 %
NEUTROPHILS ABSOLUTE: 13.3 K/UL (ref 1.7–7.7)
NEUTROPHILS RELATIVE PERCENT: 91.3 %
PDW BLD-RTO: 15.5 % (ref 12.4–15.4)
PHOSPHORUS: 1.9 MG/DL (ref 2.5–4.9)
PLATELET # BLD: 308 K/UL (ref 135–450)
PMV BLD AUTO: 7.6 FL (ref 5–10.5)
POTASSIUM SERPL-SCNC: 4 MMOL/L (ref 3.5–5.1)
RBC # BLD: 4.09 M/UL (ref 4–5.2)
SODIUM BLD-SCNC: 136 MMOL/L (ref 136–145)
WBC # BLD: 14.6 K/UL (ref 4–11)

## 2019-03-29 PROCEDURE — 6370000000 HC RX 637 (ALT 250 FOR IP): Performed by: INTERNAL MEDICINE

## 2019-03-29 PROCEDURE — 94640 AIRWAY INHALATION TREATMENT: CPT

## 2019-03-29 PROCEDURE — 94761 N-INVAS EAR/PLS OXIMETRY MLT: CPT

## 2019-03-29 PROCEDURE — 85025 COMPLETE CBC W/AUTO DIFF WBC: CPT

## 2019-03-29 PROCEDURE — 1200000000 HC SEMI PRIVATE

## 2019-03-29 PROCEDURE — 2700000000 HC OXYGEN THERAPY PER DAY

## 2019-03-29 PROCEDURE — 2580000003 HC RX 258: Performed by: INTERNAL MEDICINE

## 2019-03-29 PROCEDURE — 6360000002 HC RX W HCPCS: Performed by: INTERNAL MEDICINE

## 2019-03-29 PROCEDURE — 36415 COLL VENOUS BLD VENIPUNCTURE: CPT

## 2019-03-29 PROCEDURE — 6370000000 HC RX 637 (ALT 250 FOR IP): Performed by: HOSPITALIST

## 2019-03-29 PROCEDURE — 80069 RENAL FUNCTION PANEL: CPT

## 2019-03-29 RX ORDER — PREDNISONE 20 MG/1
40 TABLET ORAL DAILY
Status: DISCONTINUED | OUTPATIENT
Start: 2019-03-29 | End: 2019-03-30 | Stop reason: HOSPADM

## 2019-03-29 RX ADMIN — METHYLPREDNISOLONE SODIUM SUCCINATE 40 MG: 40 INJECTION, POWDER, FOR SOLUTION INTRAMUSCULAR; INTRAVENOUS at 03:28

## 2019-03-29 RX ADMIN — ATORVASTATIN CALCIUM 80 MG: 80 TABLET, FILM COATED ORAL at 07:56

## 2019-03-29 RX ADMIN — IPRATROPIUM BROMIDE AND ALBUTEROL SULFATE 1 AMPULE: .5; 3 SOLUTION RESPIRATORY (INHALATION) at 19:33

## 2019-03-29 RX ADMIN — GABAPENTIN 600 MG: 300 CAPSULE ORAL at 21:05

## 2019-03-29 RX ADMIN — GABAPENTIN 600 MG: 300 CAPSULE ORAL at 13:08

## 2019-03-29 RX ADMIN — OXYCODONE HYDROCHLORIDE AND ACETAMINOPHEN 1 TABLET: 7.5; 325 TABLET ORAL at 09:57

## 2019-03-29 RX ADMIN — PREDNISONE 40 MG: 20 TABLET ORAL at 13:07

## 2019-03-29 RX ADMIN — IPRATROPIUM BROMIDE AND ALBUTEROL SULFATE 1 AMPULE: .5; 3 SOLUTION RESPIRATORY (INHALATION) at 08:08

## 2019-03-29 RX ADMIN — GABAPENTIN 600 MG: 300 CAPSULE ORAL at 07:56

## 2019-03-29 RX ADMIN — Medication 10 ML: at 08:02

## 2019-03-29 RX ADMIN — LEVOFLOXACIN 500 MG: 500 TABLET, FILM COATED ORAL at 07:56

## 2019-03-29 RX ADMIN — OXYCODONE HYDROCHLORIDE AND ACETAMINOPHEN 1 TABLET: 7.5; 325 TABLET ORAL at 18:41

## 2019-03-29 RX ADMIN — METHYLPREDNISOLONE SODIUM SUCCINATE 40 MG: 40 INJECTION, POWDER, FOR SOLUTION INTRAMUSCULAR; INTRAVENOUS at 07:56

## 2019-03-29 RX ADMIN — OXYCODONE HYDROCHLORIDE AND ACETAMINOPHEN 1 TABLET: 7.5; 325 TABLET ORAL at 03:28

## 2019-03-29 RX ADMIN — DULOXETINE HYDROCHLORIDE 30 MG: 30 CAPSULE, DELAYED RELEASE ORAL at 07:56

## 2019-03-29 RX ADMIN — IPRATROPIUM BROMIDE AND ALBUTEROL SULFATE 1 AMPULE: .5; 3 SOLUTION RESPIRATORY (INHALATION) at 16:10

## 2019-03-29 RX ADMIN — PANTOPRAZOLE SODIUM 20 MG: 20 TABLET, DELAYED RELEASE ORAL at 05:52

## 2019-03-29 RX ADMIN — IPRATROPIUM BROMIDE AND ALBUTEROL SULFATE 1 AMPULE: .5; 3 SOLUTION RESPIRATORY (INHALATION) at 11:54

## 2019-03-29 RX ADMIN — ENOXAPARIN SODIUM 40 MG: 40 INJECTION SUBCUTANEOUS at 07:55

## 2019-03-29 RX ADMIN — Medication 10 ML: at 21:06

## 2019-03-29 ASSESSMENT — PAIN DESCRIPTION - PROGRESSION
CLINICAL_PROGRESSION: NOT CHANGED
CLINICAL_PROGRESSION: NOT CHANGED
CLINICAL_PROGRESSION: GRADUALLY IMPROVING
CLINICAL_PROGRESSION: NOT CHANGED
CLINICAL_PROGRESSION: GRADUALLY IMPROVING
CLINICAL_PROGRESSION: NOT CHANGED
CLINICAL_PROGRESSION: NOT CHANGED

## 2019-03-29 ASSESSMENT — PAIN DESCRIPTION - PAIN TYPE
TYPE: CHRONIC PAIN

## 2019-03-29 ASSESSMENT — PAIN DESCRIPTION - ONSET
ONSET: PROGRESSIVE
ONSET: PROGRESSIVE

## 2019-03-29 ASSESSMENT — PAIN DESCRIPTION - ORIENTATION
ORIENTATION: LOWER;MID;POSTERIOR;UPPER
ORIENTATION: LOWER;MID;UPPER
ORIENTATION: MID

## 2019-03-29 ASSESSMENT — PAIN - FUNCTIONAL ASSESSMENT
PAIN_FUNCTIONAL_ASSESSMENT: ACTIVITIES ARE NOT PREVENTED
PAIN_FUNCTIONAL_ASSESSMENT: ACTIVITIES ARE NOT PREVENTED

## 2019-03-29 ASSESSMENT — PAIN SCALES - GENERAL
PAINLEVEL_OUTOF10: 6
PAINLEVEL_OUTOF10: 3
PAINLEVEL_OUTOF10: 0
PAINLEVEL_OUTOF10: 6
PAINLEVEL_OUTOF10: 6
PAINLEVEL_OUTOF10: 5
PAINLEVEL_OUTOF10: 5

## 2019-03-29 ASSESSMENT — PAIN DESCRIPTION - FREQUENCY
FREQUENCY: CONTINUOUS
FREQUENCY: INTERMITTENT
FREQUENCY: INTERMITTENT

## 2019-03-29 ASSESSMENT — PAIN DESCRIPTION - DESCRIPTORS
DESCRIPTORS: ACHING
DESCRIPTORS: CONSTANT;DISCOMFORT
DESCRIPTORS: CONSTANT;DISCOMFORT

## 2019-03-29 ASSESSMENT — PAIN DESCRIPTION - LOCATION
LOCATION: BACK

## 2019-03-29 NOTE — PROGRESS NOTES
Hospitalist Progress Note      PCP: HECTOR Gold - CNP    Date of Admission: 3/27/2019    Chief Complaint: SOB    Hospital Course:   Admitted with increased SOB over the past few weeks despite outpatient treatment with steroids and abx granting only temporary improvement. Subjective: denies chest pain, increased sob, hemoptysis      Medications:  Reviewed    Infusion Medications   Scheduled Medications    atorvastatin  80 mg Oral Daily    DULoxetine  30 mg Oral Daily    gabapentin  600 mg Oral TID    pantoprazole  20 mg Oral Daily    sodium chloride flush  10 mL Intravenous 2 times per day    enoxaparin  40 mg Subcutaneous Daily    ipratropium-albuterol  1 ampule Inhalation Q4H WA    methylPREDNISolone  40 mg Intravenous Q6H    Followed by   Fabrice Kenny ON 3/30/2019] predniSONE  40 mg Oral Daily    levofloxacin  500 mg Oral Daily    nicotine  1 patch Transdermal Daily     PRN Meds: amitriptyline, oxyCODONE-acetaminophen, sodium chloride flush, magnesium hydroxide, ondansetron, acetaminophen      Intake/Output Summary (Last 24 hours) at 3/29/2019 1053  Last data filed at 3/29/2019 0802  Gross per 24 hour   Intake 480 ml   Output --   Net 480 ml       Physical Exam Performed:    BP (!) 166/79   Pulse 106   Temp 98.4 °F (36.9 °C) (Oral)   Resp 16   Ht 5' 4\" (1.626 m)   Wt 145 lb 8.1 oz (66 kg)   SpO2 90%   Breastfeeding? No   BMI 24.98 kg/m²     General appearance: No apparent distress, appears stated age and cooperative. HEENT: Pupils equal, round, and reactive to light. Conjunctivae/corneas clear. Neck: Supple, with full range of motion. No jugular venous distention. Trachea midline. Respiratory:  Normal respiratory effort. Clear to auscultation, bilaterally without Rales/Wheezes/Rhonchi. Cardiovascular: Regular rate and rhythm with normal S1/S2 without murmurs, rubs or gallops. Abdomen: Soft, non-tender, non-distended with normal bowel sounds.   Musculoskeletal: No

## 2019-03-30 VITALS
SYSTOLIC BLOOD PRESSURE: 148 MMHG | RESPIRATION RATE: 16 BRPM | HEART RATE: 105 BPM | WEIGHT: 147.27 LBS | DIASTOLIC BLOOD PRESSURE: 77 MMHG | HEIGHT: 64 IN | OXYGEN SATURATION: 93 % | BODY MASS INDEX: 25.14 KG/M2 | TEMPERATURE: 98.4 F

## 2019-03-30 PROCEDURE — 94640 AIRWAY INHALATION TREATMENT: CPT

## 2019-03-30 PROCEDURE — 6370000000 HC RX 637 (ALT 250 FOR IP): Performed by: INTERNAL MEDICINE

## 2019-03-30 PROCEDURE — 2700000000 HC OXYGEN THERAPY PER DAY

## 2019-03-30 PROCEDURE — 94761 N-INVAS EAR/PLS OXIMETRY MLT: CPT

## 2019-03-30 PROCEDURE — 6360000002 HC RX W HCPCS: Performed by: INTERNAL MEDICINE

## 2019-03-30 PROCEDURE — 6370000000 HC RX 637 (ALT 250 FOR IP): Performed by: HOSPITALIST

## 2019-03-30 RX ORDER — LEVOFLOXACIN 500 MG/1
500 TABLET, FILM COATED ORAL DAILY
Qty: 5 TABLET | Refills: 0 | Status: SHIPPED | OUTPATIENT
Start: 2019-03-31 | End: 2019-04-05

## 2019-03-30 RX ORDER — CETIRIZINE HYDROCHLORIDE 10 MG/1
10 TABLET ORAL DAILY PRN
COMMUNITY

## 2019-03-30 RX ORDER — IPRATROPIUM BROMIDE AND ALBUTEROL SULFATE 2.5; .5 MG/3ML; MG/3ML
3 SOLUTION RESPIRATORY (INHALATION) EVERY 6 HOURS
Qty: 360 ML | Refills: 1 | Status: SHIPPED | OUTPATIENT
Start: 2019-03-30 | End: 2019-09-04

## 2019-03-30 RX ADMIN — OXYCODONE HYDROCHLORIDE AND ACETAMINOPHEN 1 TABLET: 7.5; 325 TABLET ORAL at 12:19

## 2019-03-30 RX ADMIN — DULOXETINE HYDROCHLORIDE 30 MG: 30 CAPSULE, DELAYED RELEASE ORAL at 09:09

## 2019-03-30 RX ADMIN — LEVOFLOXACIN 500 MG: 500 TABLET, FILM COATED ORAL at 09:09

## 2019-03-30 RX ADMIN — PREDNISONE 40 MG: 20 TABLET ORAL at 09:09

## 2019-03-30 RX ADMIN — IPRATROPIUM BROMIDE AND ALBUTEROL SULFATE 1 AMPULE: .5; 3 SOLUTION RESPIRATORY (INHALATION) at 07:36

## 2019-03-30 RX ADMIN — IPRATROPIUM BROMIDE AND ALBUTEROL SULFATE 1 AMPULE: .5; 3 SOLUTION RESPIRATORY (INHALATION) at 11:27

## 2019-03-30 RX ADMIN — GABAPENTIN 600 MG: 300 CAPSULE ORAL at 12:19

## 2019-03-30 RX ADMIN — GABAPENTIN 600 MG: 300 CAPSULE ORAL at 09:09

## 2019-03-30 RX ADMIN — PANTOPRAZOLE SODIUM 20 MG: 20 TABLET, DELAYED RELEASE ORAL at 06:18

## 2019-03-30 RX ADMIN — ENOXAPARIN SODIUM 40 MG: 40 INJECTION SUBCUTANEOUS at 09:09

## 2019-03-30 RX ADMIN — OXYCODONE HYDROCHLORIDE AND ACETAMINOPHEN 1 TABLET: 7.5; 325 TABLET ORAL at 06:18

## 2019-03-30 RX ADMIN — ATORVASTATIN CALCIUM 80 MG: 80 TABLET, FILM COATED ORAL at 09:09

## 2019-03-30 ASSESSMENT — PAIN SCALES - GENERAL
PAINLEVEL_OUTOF10: 5
PAINLEVEL_OUTOF10: 6
PAINLEVEL_OUTOF10: 0
PAINLEVEL_OUTOF10: 4
PAINLEVEL_OUTOF10: 7

## 2019-03-30 ASSESSMENT — PAIN DESCRIPTION - DESCRIPTORS: DESCRIPTORS: DISCOMFORT

## 2019-03-30 ASSESSMENT — PAIN DESCRIPTION - PAIN TYPE
TYPE: CHRONIC PAIN
TYPE: CHRONIC PAIN

## 2019-03-30 ASSESSMENT — PAIN DESCRIPTION - ORIENTATION: ORIENTATION: LOWER;MID

## 2019-03-30 ASSESSMENT — PAIN DESCRIPTION - LOCATION
LOCATION: BACK
LOCATION: BACK

## 2019-03-30 ASSESSMENT — PAIN DESCRIPTION - FREQUENCY: FREQUENCY: CONTINUOUS

## 2019-03-30 NOTE — DISCHARGE SUMMARY
home    Condition: Stable      Time Spent: 35 minutes    Electronically signed by Chasidy Gibson MD on 3/30/2019 at 11:29 AM    Discharging Hospitalist

## 2019-03-30 NOTE — PROGRESS NOTES
Clinical Pharmacy Note  Medication Counseling    Reviewed medications. Indications and side effects were emphasized during counseling. All medication-related questions addressed. Patient verbalized understanding of education. Should the patient express any additional questions or concerns regarding their medications, please do not hesitate to contact the pharmacy department. Patient/caregiver aware they may refuse medications during hospital stay.

## 2019-03-30 NOTE — DISCHARGE INSTR - COC
DME IIKD:808224356}  Med Delivery   [address] [employer] [facility] MED Delivery:083549291}    Wound Care Documentation and Therapy:        Elimination:  Continence:   · Bowel: {YES / SQ:08118}  · Bladder: {YES / TJ:86000}  Urinary Catheter: {Urinary Catheter:715081765}   Colostomy/Ileostomy/Ileal Conduit: {YES / HI:91393}       Date of Last BM: ***  No intake or output data in the 24 hours ending 19 1338  I/O last 3 completed shifts:   In: 18 [P.O.:480]  Out: -     Safety Concerns:     [facility] [employer] Safety Concerns:230530340}    Impairments/Disabilities:      [facility] [employer] [facility] Impairments/Disabilities:578680473}    Nutrition Therapy:  Current Nutrition Therapy:   [facility] Diet List:425222965}    Routes of Feeding: {CHP DME Other Feedings:426767631}  Liquids: {Slp liquid thickness:77220}  Daily Fluid Restriction: {CHP DME Yes amt example:581349958}  Last Modified Barium Swallow with Video (Video Swallowing Test): {Done Not Done [facility]:474950142}    Treatments at the Time of Hospital Discharge:   Respiratory Treatments: ***  Oxygen Therapy:  {Therapy; copd oxygen:97758}  Ventilator:    { CC Vent CMDJ:376777734}    Rehab Therapies: {THERAPEUTIC INTERVENTION:6621671918}  Weight Bearing Status/Restrictions: [employer] Weight Bearin}  Other Medical Equipment (for information only, NOT a DME order):  {EQUIPMENT:516427893}  Other Treatments: ***    Patient's personal belongings (please select all that are sent with patient):  {CHP DME Belongings:160586044}    RN SIGNATURE:  {Esignature:884944601}    CASE MANAGEMENT/SOCIAL WORK SECTION    Inpatient Status Date: ***    Readmission Risk Assessment Score:  Readmission Risk              Risk of Unplanned Readmission:        10           Discharging to Facility/ Agency   · Name:   · Address:  · Phone:  · Fax:    Dialysis Facility (if applicable)   · Name:  · Address:  · Dialysis Schedule:  · Phone:  · Fax:    / signature: {Esignature:540465625}    PHYSICIAN SECTION    Prognosis: {Prognosis:3518591614}    Condition at Discharge: Gonzalez Noyola Patient Condition:924113427}    Rehab Potential (if transferring to Rehab): {Prognosis:3502013381}    Recommended Labs or Other Treatments After Discharge: ***    Physician Certification: I certify the above information and transfer of Bethann Lefort  is necessary for the continuing treatment of the diagnosis listed and that she requires {Admit to Appropriate Level of Care:75002} for {GREATER/LESS:961943458} 30 days.      Update Admission H&P: {CHP DME Changes in WWWDW:194830300}    PHYSICIAN SIGNATURE:  {Esignature:411872564}

## 2019-03-30 NOTE — PLAN OF CARE
Problem: Breathing Pattern - Ineffective:  Goal: Ability to achieve and maintain a regular respiratory rate will improve  Description  Ability to achieve and maintain a regular respiratory rate will improve  3/29/2019 2348 by Monico Schwab, RN  Note:   D: Pt. SPO2 87% on room air  A: O2 applied at 2L per NC   R: SPO2 93%  3/29/2019 1107 by Miles Aschoff, RN  Outcome: Ongoing  Note:   Pt educated on cough and deep breathing techniques. Monitor O2 needs.

## 2019-04-01 ENCOUNTER — TELEPHONE (OUTPATIENT)
Dept: FAMILY MEDICINE CLINIC | Age: 74
End: 2019-04-01

## 2019-04-03 DIAGNOSIS — E78.00 HYPERCHOLESTEREMIA: ICD-10-CM

## 2019-04-04 DIAGNOSIS — E78.00 HYPERCHOLESTEREMIA: ICD-10-CM

## 2019-04-04 RX ORDER — ATORVASTATIN CALCIUM 80 MG/1
TABLET, FILM COATED ORAL
Qty: 90 TABLET | Refills: 1 | Status: SHIPPED | OUTPATIENT
Start: 2019-04-04 | End: 2019-10-17 | Stop reason: SDUPTHER

## 2019-04-04 RX ORDER — ATORVASTATIN CALCIUM 80 MG/1
TABLET, FILM COATED ORAL
Qty: 30 TABLET | Refills: 5 | Status: SHIPPED | OUTPATIENT
Start: 2019-04-04 | End: 2019-04-04 | Stop reason: SDUPTHER

## 2019-04-05 ENCOUNTER — OFFICE VISIT (OUTPATIENT)
Dept: FAMILY MEDICINE CLINIC | Age: 74
End: 2019-04-05
Payer: MEDICARE

## 2019-04-05 VITALS
DIASTOLIC BLOOD PRESSURE: 80 MMHG | HEART RATE: 86 BPM | RESPIRATION RATE: 18 BRPM | OXYGEN SATURATION: 98 % | BODY MASS INDEX: 25.06 KG/M2 | SYSTOLIC BLOOD PRESSURE: 124 MMHG | WEIGHT: 146 LBS

## 2019-04-05 DIAGNOSIS — Z72.0 TOBACCO ABUSE: ICD-10-CM

## 2019-04-05 DIAGNOSIS — J44.1 COPD EXACERBATION (HCC): Primary | ICD-10-CM

## 2019-04-05 PROCEDURE — G8926 SPIRO NO PERF OR DOC: HCPCS | Performed by: NURSE PRACTITIONER

## 2019-04-05 PROCEDURE — G8399 PT W/DXA RESULTS DOCUMENT: HCPCS | Performed by: NURSE PRACTITIONER

## 2019-04-05 PROCEDURE — 1111F DSCHRG MED/CURRENT MED MERGE: CPT | Performed by: NURSE PRACTITIONER

## 2019-04-05 PROCEDURE — G8427 DOCREV CUR MEDS BY ELIG CLIN: HCPCS | Performed by: NURSE PRACTITIONER

## 2019-04-05 PROCEDURE — 4040F PNEUMOC VAC/ADMIN/RCVD: CPT | Performed by: NURSE PRACTITIONER

## 2019-04-05 PROCEDURE — 4004F PT TOBACCO SCREEN RCVD TLK: CPT | Performed by: NURSE PRACTITIONER

## 2019-04-05 PROCEDURE — 1123F ACP DISCUSS/DSCN MKR DOCD: CPT | Performed by: NURSE PRACTITIONER

## 2019-04-05 PROCEDURE — 99214 OFFICE O/P EST MOD 30 MIN: CPT | Performed by: NURSE PRACTITIONER

## 2019-04-05 PROCEDURE — G8417 CALC BMI ABV UP PARAM F/U: HCPCS | Performed by: NURSE PRACTITIONER

## 2019-04-05 PROCEDURE — 3017F COLORECTAL CA SCREEN DOC REV: CPT | Performed by: NURSE PRACTITIONER

## 2019-04-05 PROCEDURE — 1090F PRES/ABSN URINE INCON ASSESS: CPT | Performed by: NURSE PRACTITIONER

## 2019-04-05 PROCEDURE — 3023F SPIROM DOC REV: CPT | Performed by: NURSE PRACTITIONER

## 2019-04-05 RX ORDER — VARENICLINE TARTRATE 25 MG
KIT ORAL
Qty: 1 EACH | Refills: 0 | Status: SHIPPED | OUTPATIENT
Start: 2019-04-05 | End: 2019-05-15

## 2019-04-05 NOTE — PROGRESS NOTES
Post-Discharge Transitional Care Management Services or Hospital Follow Up      Lias Carter   YOB: 1945    Date of Office Visit:  4/5/2019  Date of Hospital Admission: 3/27/19  Date of Hospital Discharge: 3/30/19  Readmission Risk Score(high >=14%.  Medium >=10%):Readmission Risk Score: 10      Care management risk score Rising risk (score 2-5) and Complex Care (Scores >=6): 5     Non face to face  following discharge, date last encounter closed (first attempt may have been earlier): 4/1/2019  5:44 PM 4/1/2019  5:44 PM    Call initiated 2 business days of discharge: Yes     Patient Active Problem List   Diagnosis    Chronic obstructive pulmonary disease ([address]    Osteopenia-last dexa 2009 repeat 2015 (-2.4 hip)--advised tx    Colon polyp, hyperplastic,-(done 3/11-repeat 3-5 yrs--dr Jud Tucker)    Family history of colon cancer    CTS (carpal tunnel syndrome)BILATERAL-s/p surgical repair--resolved     Postmenopausal status-last mammogram 2/15 wnl last pap 12/13--wnl    Nondependent alcohol abuse, in remission    Urticaria, chronic    Tobacco abuse-advised to quit--lung cancer screening neg 5/18--repeat low dose ct 1 yr    Chronic back pain-(djd) saw dr Jamir Deleon afford surgery--seeing dr Anuel Ocasio for pain meds    Degeneration of lumbar or lumbosacral intervertebral disc    Fibromyalgia    Hypercholesteremia    Lumbar spondylosis    Vitamin D deficiency--severe--started 50,000 iu 2x/ wk 11/13    Insomnia--on elevil w help    Constipation--on daily miralax    Prediabetes--a1c was 6.3 1/16--diet advised    Depression    Chronic pain syndrome    Muscle cramping    ROLY (acute kidney injury) ([address]    Sepsis ([address]    Gastroesophageal reflux disease    COPD, severe ([address]    Chronic hypoxemic respiratory failure ([address]    Degeneration of lumbar or lumbosacral intervertebral disc    Trochanteric bursitis of right hip    COPD exacerbation ([address]       No Known mouth daily as needed for Allergies      ipratropium-albuterol (DUONEB) 0.5-2.5 (3) MG/3ML SOLN nebulizer solution Inhale 3 mLs into the lungs every 6 hours 360 mL 1    levofloxacin (LEVAQUIN) 500 MG tablet Take 1 tablet by mouth daily for 5 days 5 tablet 0    Nebulizers (COMPRESSOR/NEBULIZER) MISC 1 Units by Does not apply route 4 times daily 1 each 3    tiZANidine (ZANAFLEX) 4 MG tablet Take one tablet po BID 60 tablet 0    DULoxetine (CYMBALTA) 60 MG extended release capsule Take one tablet at night 30 capsule 0    amitriptyline (ELAVIL) 25 MG tablet TAKE 1 TABLET BY MOUTH NIGHTLY 30 tablet 0    gabapentin (NEURONTIN) 600 MG tablet Take 1 tablet by mouth 3 times daily for 30 days. 90 tablet 0    meloxicam (MOBIC) 15 MG tablet TAKE 1 TABLET BY MOUTH DAILY 30 tablet 0    oxyCODONE-acetaminophen (PERCOCET) 7.5-325 MG per tablet Take 1 tablet by mouth every 6 hours as needed for Pain for up to 28 days. Max 3-4 a day 100 tablet 0    pantoprazole (PROTONIX) 20 MG tablet TAKE 1 TABLET BY MOUTH DAILY 30 tablet 5    BREO ELLIPTA 200-25 MCG/INH AEPB INHALE 1 PUFF INTO THE LUNGS DAILY 1 each 5    albuterol sulfate  (90 Base) MCG/ACT inhaler Inhale 2 puffs into the lungs every 6 hours as needed for Shortness of Breath 1 Inhaler 5        Medications patient taking as of now reconciled against medications ordered at time of hospital discharge: Yes    Chief Complaint   Patient presents with    [place] [facility]     [place]30, [facility], COPD, Acute bronchitis       HPI  Patient is here for hospital follow up from COPD exacerbation. Went to ER due to worsening cough and shortness of breath even after antibiotic and prednisone use. She reports that she is feeling much better. Using nebulizer BID. Has not yet scheduled f/u appt with her pulmonologist Dr. Alina Willson. She is ready to quit smoking. Currently using nicoderm 21 patch. Last cigarette was on 3/27/19. Was smoking 1.5 ppd.  Has been smoking since age 15. She would like to use chantix. Has taken in the past without side effects. Inpatient course: Discharge summary reviewed- see chart. Review of Systems   Constitutional: Positive for fatigue. Negative for activity change and fever. Respiratory: Positive for cough and shortness of breath. Negative for chest tightness and wheezing. Cardiovascular: Negative for chest pain. Gastrointestinal: Negative for constipation, diarrhea, nausea and vomiting. Neurological: Negative for dizziness, syncope, weakness and headaches. Psychiatric/Behavioral: Negative for confusion. Vitals:    04/05/19 1117   BP: 124/80   Site: Right Upper Arm   Position: Sitting   Cuff Size: Medium Adult   Pulse: 86   Resp: 18   SpO2: 98%   Weight: 146 lb (66.2 kg)     Body mass index is 25.06 kg/m². Wt Readings from Last 3 Encounters:   04/05/19 146 lb (66.2 kg)   03/30/19 147 lb 4.3 oz (66.8 kg)   03/20/19 149 lb (67.6 kg)     BP Readings from Last 3 Encounters:   04/05/19 124/80   03/30/19 (!) 148/77   03/20/19 109/68       Physical Exam   Constitutional: She is oriented to person, place, and time. She appears well-developed and well-nourished. No distress. HENT:   Head: Normocephalic and atraumatic. Eyes: EOM are normal.   Neck: Neck supple. Cardiovascular: Normal rate, regular rhythm and normal heart sounds. Exam reveals no gallop and no friction rub. No murmur heard. Pulmonary/Chest: Effort normal and breath sounds normal. No respiratory distress. She has no wheezes. She has no rhonchi. Musculoskeletal: She exhibits no edema. Neurological: She is alert and oriented to person, place, and time. Skin: Skin is warm and dry. Psychiatric: She has a normal mood and affect. Her behavior is normal. Judgment and thought content normal.   Nursing note and vitals reviewed. Assessment/Plan:  1. COPD exacerbation ([address]  Symptoms are resolving. Has last dose of levaquin today.  Tolerated medication well.   Continue inhalers as prescribed. Continue to f/u with pulmonologist Dr. Maryam Lezama    Discussed the importance of smoking cessation     2. Tobacco abuse-advised to quit--lung cancer screening neg 5/18--repeat low dose ct 1 yr  Patient reports that she is ready to quit. Has not smoked in the past week. She is currently using nicoderm patch, but she would like to start chantix. - varenicline (CHANTIX STARTING MONTH PAK) 0.5 MG X 11 & 1 MG X 42 tablet; Take by mouth daily as directed  Dispense: 1 each; Refill: 0   Side effects of medication discussed. Should take medication with full glass of water and meal.   Once she is finished with the starting pack, she will call for the continuation chantix dose.

## 2019-04-06 ASSESSMENT — ENCOUNTER SYMPTOMS
VOMITING: 0
WHEEZING: 0
COUGH: 1
CONSTIPATION: 0
NAUSEA: 0
SHORTNESS OF BREATH: 1
DIARRHEA: 0
CHEST TIGHTNESS: 0

## 2019-04-11 ENCOUNTER — TELEPHONE (OUTPATIENT)
Dept: PHARMACY | Age: 74
End: 2019-04-11

## 2019-04-11 NOTE — TELEPHONE ENCOUNTER
[address] [facility]  COPD Service  [phone]      Follow up phone call:    Patient referred by [facility] emergency department. I called and left a massage with our number to call back if interested in scheduling an appointment. Appropriate staff has been notified with regards to any concerns noted above     [address] [facility]  COPD Service  Phone: [phone]  Fax [phone]    We also have outpatient services in Heart Failure, Diabetes, Anticoagulation and Infusion.

## 2019-04-17 ENCOUNTER — OFFICE VISIT (OUTPATIENT)
Dept: PAIN MANAGEMENT | Age: 74
End: 2019-04-17
Payer: MEDICARE

## 2019-04-17 VITALS
BODY MASS INDEX: 25.4 KG/M2 | SYSTOLIC BLOOD PRESSURE: 93 MMHG | HEART RATE: 78 BPM | WEIGHT: 148 LBS | DIASTOLIC BLOOD PRESSURE: 57 MMHG

## 2019-04-17 DIAGNOSIS — R25.2 MUSCLE CRAMPING: ICD-10-CM

## 2019-04-17 DIAGNOSIS — M79.7 FIBROMYALGIA: ICD-10-CM

## 2019-04-17 DIAGNOSIS — M51.37 DEGENERATION OF LUMBAR OR LUMBOSACRAL INTERVERTEBRAL DISC: ICD-10-CM

## 2019-04-17 DIAGNOSIS — G89.4 CHRONIC PAIN SYNDROME: ICD-10-CM

## 2019-04-17 DIAGNOSIS — M47.26 OSTEOARTHRITIS OF SPINE WITH RADICULOPATHY, LUMBAR REGION: ICD-10-CM

## 2019-04-17 DIAGNOSIS — F51.01 PRIMARY INSOMNIA: ICD-10-CM

## 2019-04-17 DIAGNOSIS — K59.03 DRUG-INDUCED CONSTIPATION: ICD-10-CM

## 2019-04-17 DIAGNOSIS — F33.1 MODERATE EPISODE OF RECURRENT MAJOR DEPRESSIVE DISORDER (HCC): ICD-10-CM

## 2019-04-17 DIAGNOSIS — M47.816 LUMBAR SPONDYLOSIS: ICD-10-CM

## 2019-04-17 DIAGNOSIS — M51.36 DDD (DEGENERATIVE DISC DISEASE), LUMBAR: ICD-10-CM

## 2019-04-17 DIAGNOSIS — K21.9 GASTROESOPHAGEAL REFLUX DISEASE WITHOUT ESOPHAGITIS: ICD-10-CM

## 2019-04-17 PROCEDURE — 99214 OFFICE O/P EST MOD 30 MIN: CPT | Performed by: INTERNAL MEDICINE

## 2019-04-17 PROCEDURE — 4004F PT TOBACCO SCREEN RCVD TLK: CPT | Performed by: INTERNAL MEDICINE

## 2019-04-17 PROCEDURE — 1123F ACP DISCUSS/DSCN MKR DOCD: CPT | Performed by: INTERNAL MEDICINE

## 2019-04-17 PROCEDURE — 1090F PRES/ABSN URINE INCON ASSESS: CPT | Performed by: INTERNAL MEDICINE

## 2019-04-17 PROCEDURE — 1111F DSCHRG MED/CURRENT MED MERGE: CPT | Performed by: INTERNAL MEDICINE

## 2019-04-17 PROCEDURE — G8417 CALC BMI ABV UP PARAM F/U: HCPCS | Performed by: INTERNAL MEDICINE

## 2019-04-17 PROCEDURE — 3017F COLORECTAL CA SCREEN DOC REV: CPT | Performed by: INTERNAL MEDICINE

## 2019-04-17 PROCEDURE — 4040F PNEUMOC VAC/ADMIN/RCVD: CPT | Performed by: INTERNAL MEDICINE

## 2019-04-17 PROCEDURE — G8427 DOCREV CUR MEDS BY ELIG CLIN: HCPCS | Performed by: INTERNAL MEDICINE

## 2019-04-17 PROCEDURE — G8399 PT W/DXA RESULTS DOCUMENT: HCPCS | Performed by: INTERNAL MEDICINE

## 2019-04-17 RX ORDER — AMITRIPTYLINE HYDROCHLORIDE 25 MG/1
TABLET, FILM COATED ORAL
Qty: 30 TABLET | Refills: 0 | Status: SHIPPED | OUTPATIENT
Start: 2019-04-17 | End: 2019-05-15 | Stop reason: SDUPTHER

## 2019-04-17 RX ORDER — PANTOPRAZOLE SODIUM 40 MG/1
40 TABLET, DELAYED RELEASE ORAL DAILY
Qty: 30 TABLET | Refills: 0 | Status: SHIPPED | OUTPATIENT
Start: 2019-04-17 | End: 2019-05-15 | Stop reason: SDUPTHER

## 2019-04-17 RX ORDER — GABAPENTIN 600 MG/1
600 TABLET ORAL 3 TIMES DAILY
Qty: 90 TABLET | Refills: 0 | Status: SHIPPED | OUTPATIENT
Start: 2019-04-17 | End: 2019-05-15 | Stop reason: SDUPTHER

## 2019-04-17 RX ORDER — OXYCODONE AND ACETAMINOPHEN 7.5; 325 MG/1; MG/1
1 TABLET ORAL EVERY 6 HOURS PRN
Qty: 100 TABLET | Refills: 0 | Status: SHIPPED | OUTPATIENT
Start: 2019-04-17 | End: 2019-05-15 | Stop reason: SDUPTHER

## 2019-04-17 RX ORDER — DULOXETIN HYDROCHLORIDE 60 MG/1
CAPSULE, DELAYED RELEASE ORAL
Qty: 30 CAPSULE | Refills: 0 | Status: SHIPPED | OUTPATIENT
Start: 2019-04-17 | End: 2019-05-15 | Stop reason: SDUPTHER

## 2019-04-17 RX ORDER — TIZANIDINE 4 MG/1
TABLET ORAL
Qty: 60 TABLET | Refills: 0 | Status: SHIPPED | OUTPATIENT
Start: 2019-04-17 | End: 2019-05-15 | Stop reason: SDUPTHER

## 2019-04-17 NOTE — PROGRESS NOTES
Teressa Molina  1945  [id number]    HISTORY OF PRESENT ILLNESS:  Ms. Felicita Velazquez is a 72 y.o. female returns for a follow up visit for multiple medical problems. Her current presenting problems are   1. Chronic pain syndrome    2. Fibromyalgia    3. Degeneration of lumbar or lumbosacral intervertebral disc    4. Lumbar spondylosis    5. Osteoarthritis of spine with radiculopathy, lumbar region    6. Muscle cramping    7. Primary insomnia    8. Drug-induced constipation    9. Moderate episode of recurrent major depressive disorder ([facility] [address]    10. Gastroesophageal reflux disease without esophagitis    . As per information/history obtained from the PADT(patient assessment and documentation tool) - She complains of pain in the shoulders Bilateral, upper back and lower back with radiation to the buttocks and hips Bilateral She rates the pain 5/10 and describes it as aching, burning. Pain is made worse by: standing. Current treatment regimen has helped relieve about 50% of the pain. She denies side effects from the current pain regimen. Patient reports that since the last follow up visit the physical functioning is worse, family/social relationships are unchanged, mood is worse and sleep patterns are worse, and that the overall functioning is worse. Patient denies neurological bowel or bladder. Patient denies misusing/abusing her narcotic pain medications or using any illegal drugs. There are No indicators for possible drug abuse, addiction or diversion problems. Allayne Nallely Upon obtaining the medical history from Ms. Felicita Velazquez regarding today's office visit for her presenting problems, Ms. Felicita Velazquez states she has been having increase pain in the shoulders now along with the back. Patient says she has been using Cymbalta 60 mg only. She reports she is trying to quit smoking, she was in the hospital for COPD issues. She mentions she is using Percocet 3-4 per day.  Patient mentions she is having GERD symptoms, she has been feeling sick a lot, not sure if it is from Mobic or Chantix. Sleep is poor,  poor sleep latency, averages 2-3 hours of sleep at night. Intermittent, non restorative. Does not feel rested in AM. Complains of feeling sleepy  during the day. Patient's  subjective report of her mood is fair. she describes occasional symptoms of depression, occasional  irritability and some mood swings. Describes her mood as being neutral and reports some pleasure in her daily activities. Reports  fair  appetite, energy and concentration. Able to function well in different aspects of her daily activities. Denies suicidal or homicidal ideation. Denies any complaints of increased tension, does   Worry sometimes and occasional  irritability  she denies any c/o increased anxiety, No c/o panic attacks or symptoms of PTSD. Patient denies any BRBPR or melena. ALLERGIES/PAST MED/FAM/SOC HISTORY: Ms. Magalie Corral allergies, past medical, family and social history were reviewed in the chart and also listed below.   Social History     Socioeconomic History    Marital status: Single     Spouse name: Not on file    Number of children: 3    Years of education: Not on file    Highest education level: Not on file   Occupational History    Occupation: retired---[employer]   Social Needs    Financial resource strain: Not on file    Food insecurity:     Worry: Not on file     Inability: Not on file   [employer] needs:     Medical: Not on file     Non-medical: Not on file   Tobacco Use    Smoking status: Current Every Day Smoker     Packs/day: 1.50     Years: 55.00     Pack years: 82.50     Types: Cigarettes     Start date: 1/1/1962    Smokeless tobacco: Never Used    Tobacco comment: thinking of quitting   Substance and Sexual Activity    Alcohol use: No     Alcohol/week: 0.0 oz    Drug use: No    Sexual activity: Yes     Partners: Male   Lifestyle    Physical activity:     Days per week: Not on file     Minutes per session: Not on file    Stress: Not on file   Relationships    Social connections:     Talks on phone: Not on file     Gets together: Not on file     Attends Rastafari service: Not on file     Active member of club or organization: Not on file     Attends meetings of clubs or organizations: Not on file     Relationship status: Not on file    Intimate partner violence:     Fear of current or ex partner: Not on file     Emotionally abused: Not on file     Physically abused: Not on file     Forced sexual activity: Not on file   Other Topics Concern    Not on file   Social History Narrative    Not on file       Ms. Twan Dumont current medications are   Outpatient Medications Prior to Visit   Medication Sig Dispense Refill    varenicline (CHANTIX STARTING MONTH PAK) 0.5 MG X 11 & 1 MG X 42 tablet Take by mouth daily as directed 1 each 0    atorvastatin (LIPITOR) 80 MG tablet TAKE 1 TABLET BY MOUTH EVERY DAY FOR CHOLESTEROL 90 tablet 1    cetirizine (ZYRTEC) 10 MG tablet Take 10 mg by mouth daily as needed for Allergies      ipratropium-albuterol (DUONEB) 0.5-2.5 (3) MG/3ML SOLN nebulizer solution Inhale 3 mLs into the lungs every 6 hours 360 mL 1    Nebulizers (COMPRESSOR/NEBULIZER) MISC 1 Units by Does not apply route 4 times daily 1 each 3    tiZANidine (ZANAFLEX) 4 MG tablet Take one tablet po BID 60 tablet 0    DULoxetine (CYMBALTA) 60 MG extended release capsule Take one tablet at night 30 capsule 0    amitriptyline (ELAVIL) 25 MG tablet TAKE 1 TABLET BY MOUTH NIGHTLY 30 tablet 0    gabapentin (NEURONTIN) 600 MG tablet Take 1 tablet by mouth 3 times daily for 30 days. 90 tablet 0    meloxicam (MOBIC) 15 MG tablet TAKE 1 TABLET BY MOUTH DAILY 30 tablet 0    oxyCODONE-acetaminophen (PERCOCET) 7.5-325 MG per tablet Take 1 tablet by mouth every 6 hours as needed for Pain for up to 28 days.  Max 3-4 a day 100 tablet 0    pantoprazole (PROTONIX) 20 MG tablet TAKE 1 TABLET BY MOUTH DAILY 30 tablet 5    BREO ELLIPTA 200-25 MCG/INH AEPB INHALE 1 PUFF INTO THE LUNGS DAILY 1 each 5    albuterol sulfate  (90 Base) MCG/ACT inhaler Inhale 2 puffs into the lungs every 6 hours as needed for Shortness of Breath 1 Inhaler 5     No facility-administered medications prior to visit. REVIEW OF SYSTEMS:   Constitutional: Negative for fatigue and unexpected weight change. Eyes: Negative for visual disturbance. Respiratory: Negative for shortness of breath. Cardiovascular: Negative for chest pain, palpitations  Gastrointestinal: Negative for blood in stool, abdominal distention, nausea, vomiting, abdominal pain, diarrhea,constipation. Skin: Negative for color change or any abnormal bruising. Neurological: Negative for speech difficulty, weakness and light-headedness, dizziness, tremors, sleepiness  Psychiatric/Behavioral: Negative for suicidal ideas, hallucinations, behavioral problems, self-injury, decreased concentration/cognition, agitation, confusion. PHYSICAL EXAM:   Nursing note and vitals reviewed. BP (!) 93/57   Pulse 78   Wt 148 lb (67.1 kg)   BMI 25.40 kg/m²   General Appearance: Patient is well nourished, well developed, well groomed and in no acute distress. Skin: Skin is warm and dry, good turgor . No rash or lesions noted. She is not diaphoretic. Pulmonary/Chest: Effort normal. No respiratory distress or use of accessory muscles. Auscultation revealing decreased air exchange bilaterally. She does not have wheezes in the lung fields. She has no rales. Cardiovascular: Normal rate, regular rhythm, normal heart sounds, and does not have murmur. Exam reveals no gallop and no friction rub. Abdominal: Soft. Bowel sounds are normal.  On inspection of abdomen is flat no distension and no mass. No tenderness. She has no rebound and no guarding. Musculoskeletal / Extremities: Range of motion is normal. Gait is normal, assistive devices use: none. She exhibits edema: none, and no tenderness. Neurological/Psychiatric:She is alert and oriented to person, place, and time. Coordination is  normal.   Judgement and Insight is normal  Her mood is Appropriate and affect is Flat/blunted and Anxious . Her behavior is normal.   thought content normal.        IMPRESSION:     1. Gastroesophageal reflux disease without esophagitis - INADEQUATELY CONTROLLED: treatment plan adjusted as below    2. Chronic pain syndrome - INADEQUATELY CONTROLLED: treatment plan adjusted as below    3. Fibromyalgia - INADEQUATELY CONTROLLED: treatment plan adjusted as below    4. Primary insomnia - INADEQUATELY CONTROLLED: treatment plan adjusted as below    5. Degeneration of lumbar or lumbosacral intervertebral disc - STABLE: Continue current treatment plan    6. Lumbar spondylosis - STABLE: Continue current treatment plan    7. Osteoarthritis of spine with radiculopathy, lumbar region STABLE: Continue current treatment plan    8. Muscle cramping STABLE: Continue current treatment plan    9. Drug-induced constipation STABLE: Continue current treatment plan    10. Moderate episode of recurrent major depressive disorder ([facility] [address] STABLE: Continue current treatment plan        PLAN:  Informed verbal consent was obtained. -Advised caffeine reduction, dietary changes, elevate head end of bed, NPO after supper, if using alcohol advised reduction of alcohol intake, tobacco cessation if smoking, weight loss, d/c Mobic  -advised to increase Protonix to 40 mg daily  -Advised patient to quit smoking for  health related concerns and to improve the treatment outcomes. Education was given on quitting smoking and the use of different modalities including medications, hypnotherapy, counselling  and biofeedback.  These were discussed with patient.   -she was advised proper sleep hygiene-told to avoid:use of caffeine or other stimulants after noon, alcohol use near bedtime, long or frequent naps during the day, erratic sleep schedule, heavy meals near bedtime, vigorous exercise near bedtime and use of electronic devices near bedtime, continue with Elavil  -She was advised to increase fluids ( 5-7  glasses of fluid daily), limit caffeine, avoid cheese products, increase dietary fiber, increase activity and exercise as tolerated and relax regularly and enjoy meals   -CBT techniques- relaxation therapies such as biofeedback, mindfulness based stress reduction, imagery, cognitive restructuring, problem solving discussed with patient, continue with Cymbalta but decrease to 60 daily  -back stretching exercises as advised   -Most recent labs were reviewed and are within normal limits. Will repeat them within next 9-12 months if there is no status change from hospital, No new pertinent information affecting  the care plan for today's visit was obtained from these records   Ms. Colin Navarro will be prescribed  the medications  listed below which are for treatment of her presenting  medical problems which for this visit include:   Diagnoses of Chronic pain syndrome, Fibromyalgia, Degeneration of lumbar or lumbosacral intervertebral disc, Lumbar spondylosis, Osteoarthritis of spine with radiculopathy, lumbar region, Muscle cramping, Primary insomnia, Drug-induced constipation, Moderate episode of recurrent major depressive disorder ([address] and Gastroesophageal reflux disease without esophagitis were pertinent to this visit.   Medications/orders associated with this visit:    Current Outpatient Medications   Medication Sig Dispense Refill    varenicline (CHANTIX STARTING MONTH PAK) 0.5 MG X 11 & 1 MG X 42 tablet Take by mouth daily as directed 1 each 0    atorvastatin (LIPITOR) 80 MG tablet TAKE 1 TABLET BY MOUTH EVERY DAY FOR CHOLESTEROL 90 tablet 1    cetirizine (ZYRTEC) 10 MG tablet Take 10 mg by mouth daily as needed for Allergies      ipratropium-albuterol (DUONEB) 0.5-2.5 (3) MG/3ML SOLN nebulizer solution Inhale 3 mLs into the lungs every 6 hours 360 mL 1    Nebulizers (COMPRESSOR/NEBULIZER) MISC 1 Units by Does not apply route 4 times daily 1 each 3    tiZANidine (ZANAFLEX) 4 MG tablet Take one tablet po BID 60 tablet 0    DULoxetine (CYMBALTA) 60 MG extended release capsule Take one tablet at night 30 capsule 0    amitriptyline (ELAVIL) 25 MG tablet TAKE 1 TABLET BY MOUTH NIGHTLY 30 tablet 0    gabapentin (NEURONTIN) 600 MG tablet Take 1 tablet by mouth 3 times daily for 30 days. 90 tablet 0    meloxicam (MOBIC) 15 MG tablet TAKE 1 TABLET BY MOUTH DAILY 30 tablet 0    oxyCODONE-acetaminophen (PERCOCET) 7.5-325 MG per tablet Take 1 tablet by mouth every 6 hours as needed for Pain for up to 28 days. Max 3-4 a day 100 tablet 0    pantoprazole (PROTONIX) 20 MG tablet TAKE 1 TABLET BY MOUTH DAILY 30 tablet 5    BREO ELLIPTA 200-25 MCG/INH AEPB INHALE 1 PUFF INTO THE LUNGS DAILY 1 each 5    albuterol sulfate  (90 Base) MCG/ACT inhaler Inhale 2 puffs into the lungs every 6 hours as needed for Shortness of Breath 1 Inhaler 5     No current facility-administered medications for this visit. Goals of current treatment regimen include improvement in pain, restoration of functioning- with focus on improvement in physical performance, general activity, work or disability,emotional distress, health care utilization and  decreased medication consumption. Will continue to monitor progress towards achieving/maintaining therapeutic goals with special emphasis on  1. Improvement in perceived interfernce  of pain with ADL's. Ability to do home exercises independently. Ability to do household chores indoor and/or outdoor work and social and leisure activities. To increase flexibility/ROM, strength and endurance. Improve psychosocial and physical functioning.- she is not showing any significant progress/or showing regression  towards this goal and reassessment and adjustment of goals/treatment have been made. 2. Improving sleep to 6-7 hours a night.  Improve mood/ anxiety and depression symptoms such as crying spells, low energy, problems with concentration, motivation. - she is not showing any significant progress/or showing regression  towards this goal and reassessment and adjustment of goals/treatment have been made. 3. Reduction of reliance on opioid analgesia/more appropriate opioid use. - she is showing progression towards this treatment goal with the current regimen. Risks and benefits of the medications and other alternative treatments have been/were  discussed with the patient. Any questions on the  common side effects of these medications were also answered. She was advised against drinking alcohol with the narcotic pain medicines, advised against driving or handling machinery when  starting or adjusting the dose of medicines, feeling groggy or drowsy, or if having any cognitive issues related to the current medications. Sheis fully aware of the risk of overdose and death, if medicines are misused and not taken as prescribed. If she develops new symptoms or if the symptoms worsen, she was told to call the office. .  Thank you for allowing me to participate in the care of this patient. Maximilian Albarado MD.    Cc: HECTOR Mera - FRED PICKETT, Lynette Ludwig, scribing for in the presence  of Dr. Maximilian Albarado.   04/17/19  11:07 AM  Daryle Ram L. Caprice Purpura Assistant    I, Dr. Maximilian Albarado, personally performed the services described in this documentation as scribed by  Lynette Ludwig MA in my presence and it is both accurate and complete

## 2019-05-15 ENCOUNTER — OFFICE VISIT (OUTPATIENT)
Dept: PAIN MANAGEMENT | Age: 74
End: 2019-05-15
Payer: MEDICARE

## 2019-05-15 VITALS
WEIGHT: 152 LBS | DIASTOLIC BLOOD PRESSURE: 78 MMHG | HEART RATE: 85 BPM | SYSTOLIC BLOOD PRESSURE: 148 MMHG | BODY MASS INDEX: 26.09 KG/M2

## 2019-05-15 DIAGNOSIS — M47.26 OSTEOARTHRITIS OF SPINE WITH RADICULOPATHY, LUMBAR REGION: ICD-10-CM

## 2019-05-15 DIAGNOSIS — M51.36 DDD (DEGENERATIVE DISC DISEASE), LUMBAR: ICD-10-CM

## 2019-05-15 DIAGNOSIS — M47.816 LUMBAR SPONDYLOSIS: ICD-10-CM

## 2019-05-15 DIAGNOSIS — M79.7 FIBROMYALGIA: ICD-10-CM

## 2019-05-15 DIAGNOSIS — M51.37 DEGENERATION OF LUMBAR OR LUMBOSACRAL INTERVERTEBRAL DISC: ICD-10-CM

## 2019-05-15 DIAGNOSIS — G89.4 CHRONIC PAIN SYNDROME: ICD-10-CM

## 2019-05-15 PROCEDURE — 1123F ACP DISCUSS/DSCN MKR DOCD: CPT | Performed by: INTERNAL MEDICINE

## 2019-05-15 PROCEDURE — 4040F PNEUMOC VAC/ADMIN/RCVD: CPT | Performed by: INTERNAL MEDICINE

## 2019-05-15 PROCEDURE — G8417 CALC BMI ABV UP PARAM F/U: HCPCS | Performed by: INTERNAL MEDICINE

## 2019-05-15 PROCEDURE — G8399 PT W/DXA RESULTS DOCUMENT: HCPCS | Performed by: INTERNAL MEDICINE

## 2019-05-15 PROCEDURE — 99213 OFFICE O/P EST LOW 20 MIN: CPT | Performed by: INTERNAL MEDICINE

## 2019-05-15 PROCEDURE — 1090F PRES/ABSN URINE INCON ASSESS: CPT | Performed by: INTERNAL MEDICINE

## 2019-05-15 PROCEDURE — G8427 DOCREV CUR MEDS BY ELIG CLIN: HCPCS | Performed by: INTERNAL MEDICINE

## 2019-05-15 PROCEDURE — 3017F COLORECTAL CA SCREEN DOC REV: CPT | Performed by: INTERNAL MEDICINE

## 2019-05-15 PROCEDURE — 4004F PT TOBACCO SCREEN RCVD TLK: CPT | Performed by: INTERNAL MEDICINE

## 2019-05-15 RX ORDER — PANTOPRAZOLE SODIUM 40 MG/1
40 TABLET, DELAYED RELEASE ORAL DAILY
Qty: 30 TABLET | Refills: 1 | Status: SHIPPED | OUTPATIENT
Start: 2019-05-15 | End: 2019-07-25 | Stop reason: SDUPTHER

## 2019-05-15 RX ORDER — OXYCODONE AND ACETAMINOPHEN 7.5; 325 MG/1; MG/1
1 TABLET ORAL EVERY 6 HOURS PRN
Qty: 120 TABLET | Refills: 0 | Status: SHIPPED | OUTPATIENT
Start: 2019-05-15 | End: 2019-06-18 | Stop reason: SDUPTHER

## 2019-05-15 RX ORDER — TIZANIDINE 4 MG/1
TABLET ORAL
Qty: 60 TABLET | Refills: 1 | Status: SHIPPED | OUTPATIENT
Start: 2019-05-15 | End: 2019-08-20 | Stop reason: SDUPTHER

## 2019-05-15 RX ORDER — GABAPENTIN 600 MG/1
600 TABLET ORAL 3 TIMES DAILY
Qty: 90 TABLET | Refills: 1 | Status: SHIPPED | OUTPATIENT
Start: 2019-05-15 | End: 2019-07-23 | Stop reason: SDUPTHER

## 2019-05-15 RX ORDER — DULOXETIN HYDROCHLORIDE 60 MG/1
CAPSULE, DELAYED RELEASE ORAL
Qty: 30 CAPSULE | Refills: 1 | Status: SHIPPED | OUTPATIENT
Start: 2019-05-15 | End: 2019-07-23 | Stop reason: SDUPTHER

## 2019-05-15 RX ORDER — AMITRIPTYLINE HYDROCHLORIDE 25 MG/1
TABLET, FILM COATED ORAL
Qty: 30 TABLET | Refills: 1 | Status: SHIPPED | OUTPATIENT
Start: 2019-05-15 | End: 2019-08-20 | Stop reason: SDUPTHER

## 2019-05-15 RX ORDER — MELOXICAM 15 MG/1
TABLET ORAL
Qty: 30 TABLET | Refills: 1 | Status: SHIPPED | OUTPATIENT
Start: 2019-05-15 | End: 2019-08-20 | Stop reason: SDUPTHER

## 2019-05-15 NOTE — PROGRESS NOTES
Elia Fabry  1945  [id number]    HISTORY OF PRESENT ILLNESS:  Ms. Avni Santo is a 72 y.o. female returns for a follow up visit for multiple medical problems. Her current presenting problems are   1. Chronic pain syndrome    2. Gastroesophageal reflux disease without esophagitis    3. Fibromyalgia    4. Primary insomnia    5. Degeneration of lumbar or lumbosacral intervertebral disc    6. Lumbar spondylosis    7. Osteoarthritis of spine with radiculopathy, lumbar region    8. Muscle cramping    9. Drug-induced constipation    10. Moderate episode of recurrent major depressive disorder ([address]    11. DDD (degenerative disc disease), lumbar    . As per information/history obtained from the PADT(patient assessment and documentation tool) - She complains of pain in the lower back with radiation to the buttocks She rates the pain 6/10 and describes it as aching. Pain is made worse by: standing. Current treatment regimen has helped relieve about 40% of the pain. She denies side effects from the current pain regimen. Patient reports that since the last follow up visit the physical functioning is unchanged, family/social relationships are unchanged, mood is unchanged and sleep patterns are unchanged, and that the overall functioning is unchanged. Patient denies neurological bowel or bladder. Patient denies misusing/abusing her narcotic pain medications or using any illegal drugs. There are No indicators for possible drug abuse, addiction or diversion problems. Upon obtaining the medical history from Ms. Avni Santo regarding today's office visit for her presenting problems, Patient states she is doing fir. She says the pain has been baseline and tolerable somewhat with the medications. Denies abdominal pain, dysphagia, gas bloat, heartburn, nausea, odynophagia, regurgitation, vomiting and water brash-GERD Sxs are controlled  with the medications. She mentions she is managing her ADLs and has been walking some daily.  She denies much weight gain. ALLERGIES: Patients list of allergies were reviewed     MEDICATIONS: Ms. Dylan Shankar list of medications were reviewed. Her current medications are   Outpatient Medications Prior to Visit   Medication Sig Dispense Refill    tiZANidine (ZANAFLEX) 4 MG tablet Take one tablet po BID 60 tablet 0    DULoxetine (CYMBALTA) 60 MG extended release capsule Take one tablet at night 30 capsule 0    amitriptyline (ELAVIL) 25 MG tablet TAKE 1 TABLET BY MOUTH NIGHTLY 30 tablet 0    gabapentin (NEURONTIN) 600 MG tablet Take 1 tablet by mouth 3 times daily for 30 days. 90 tablet 0    oxyCODONE-acetaminophen (PERCOCET) 7.5-325 MG per tablet Take 1 tablet by mouth every 6 hours as needed for Pain for up to 28 days. Max 3-4 a day 100 tablet 0    pantoprazole (PROTONIX) 40 MG tablet Take 1 tablet by mouth daily 30 tablet 0    varenicline (CHANTIX STARTING MONTH ROSALIO) 0.5 MG X 11 & 1 MG X 42 tablet Take by mouth daily as directed 1 each 0    atorvastatin (LIPITOR) 80 MG tablet TAKE 1 TABLET BY MOUTH EVERY DAY FOR CHOLESTEROL 90 tablet 1    cetirizine (ZYRTEC) 10 MG tablet Take 10 mg by mouth daily as needed for Allergies      ipratropium-albuterol (DUONEB) 0.5-2.5 (3) MG/3ML SOLN nebulizer solution Inhale 3 mLs into the lungs every 6 hours 360 mL 1    Nebulizers (COMPRESSOR/NEBULIZER) MISC 1 Units by Does not apply route 4 times daily 1 each 3    meloxicam (MOBIC) 15 MG tablet TAKE 1 TABLET BY MOUTH DAILY 30 tablet 0    pantoprazole (PROTONIX) 20 MG tablet TAKE 1 TABLET BY MOUTH DAILY 30 tablet 5    BREO ELLIPTA 200-25 MCG/INH AEPB INHALE 1 PUFF INTO THE LUNGS DAILY 1 each 5    albuterol sulfate  (90 Base) MCG/ACT inhaler Inhale 2 puffs into the lungs every 6 hours as needed for Shortness of Breath 1 Inhaler 5     No facility-administered medications prior to visit. SOCIAL/FAMILY/PAST MEDICAL HISTORY: Ms. Rosetta Arias, family and past medical history was reviewed.      REVIEW OF regularly and enjoy meals   -Advised caffeine reduction, dietary changes, elevate head end of bed, NPO after supper, if using alcohol advised reduction of alcohol intake, tobacco cessation if smoking, weight loss    -Advised patient to quit smoking for  health related concerns and to improve the treatment outcomes. Education was given on quitting smoking and the use of different modalities including medications, hypnotherapy, counselling  and biofeedback. These were discussed with patient. Current Outpatient Medications   Medication Sig Dispense Refill    tiZANidine (ZANAFLEX) 4 MG tablet Take one tablet po BID 60 tablet 0    DULoxetine (CYMBALTA) 60 MG extended release capsule Take one tablet at night 30 capsule 0    amitriptyline (ELAVIL) 25 MG tablet TAKE 1 TABLET BY MOUTH NIGHTLY 30 tablet 0    gabapentin (NEURONTIN) 600 MG tablet Take 1 tablet by mouth 3 times daily for 30 days. 90 tablet 0    oxyCODONE-acetaminophen (PERCOCET) 7.5-325 MG per tablet Take 1 tablet by mouth every 6 hours as needed for Pain for up to 28 days.  Max 3-4 a day 100 tablet 0    pantoprazole (PROTONIX) 40 MG tablet Take 1 tablet by mouth daily 30 tablet 0    varenicline (CHANTIX STARTING MONTH ROSALIO) 0.5 MG X 11 & 1 MG X 42 tablet Take by mouth daily as directed 1 each 0    atorvastatin (LIPITOR) 80 MG tablet TAKE 1 TABLET BY MOUTH EVERY DAY FOR CHOLESTEROL 90 tablet 1    cetirizine (ZYRTEC) 10 MG tablet Take 10 mg by mouth daily as needed for Allergies      ipratropium-albuterol (DUONEB) 0.5-2.5 (3) MG/3ML SOLN nebulizer solution Inhale 3 mLs into the lungs every 6 hours 360 mL 1    Nebulizers (COMPRESSOR/NEBULIZER) MISC 1 Units by Does not apply route 4 times daily 1 each 3    meloxicam (MOBIC) 15 MG tablet TAKE 1 TABLET BY MOUTH DAILY 30 tablet 0    pantoprazole (PROTONIX) 20 MG tablet TAKE 1 TABLET BY MOUTH DAILY 30 tablet 5    BREO ELLIPTA 200-25 MCG/INH AEPB INHALE 1 PUFF INTO THE LUNGS DAILY 1 each 5    albuterol sulfate  (90 Base) MCG/ACT inhaler Inhale 2 puffs into the lungs every 6 hours as needed for Shortness of Breath 1 Inhaler 5     No current facility-administered medications for this visit. I will continue her current medication regimen  which is part of the above treatment schedule. It has been helping with Ms. Sim Weber chronic  medical problems which for this visit include:   Diagnoses of Chronic pain syndrome, Gastroesophageal reflux disease without esophagitis, Fibromyalgia, Degeneration of lumbar or lumbosacral intervertebral disc, Lumbar spondylosis, Osteoarthritis of spine with radiculopathy, lumbar region, and DDD (degenerative disc disease), lumbar were pertinent to this visit. Risks and benefits of the medications and other alternative treatments  including no treatment were discussed with the patient. The common side effects of these medications were also explained to the patient. Informed verbal consent was obtained. Goals of current treatment regimen include improvement in pain, restoration of functioning- with focus on improvement in physical performance, general activity, work or disability,emotional distress, health care utilization and  decreased medication consumption. Will continue to monitor progress towards achieving/maintaining therapeutic goals with special emphasis on  1. Improvement in perceived interfernce  of pain with ADL's. Ability to do home exercises independently. Ability to do household chores indoor and/or outdoor work and social and leisure activities. Improve psychosocial and physical functioning. - she is showing progression towards this treatment goal with the current regimen. She was advised against drinking alcohol with the narcotic pain medicines, advised against driving or handling machinery while adjusting the dose of medicines or if having cognitive  issues related to the current medications. Risk of overdose and death, if medicines not taken as prescribed, were also discussed. If the patient develops new symptoms or if the symptoms worsen, the patient should call the office. While transcribing every attempt was made to maintain the accuracy of the note in terms of it's contents,there may have been some errors made inadvertently. Thank you for allowing me to participate in the care of this patient.     Yahir Muñiz MD.    Cc: Cristy Harvey, APRN - CNP    I, Daria Shankar am scribing for and in the presence of Dr. Yahir Muñiz.   05/15/19  11:36 AM  Daria Shankar MA    I, Dr. Yahir Muñiz, personally performed the services described in this documentation as scribed by   Daria Shankar MA in my presence and it is both accurate and complete

## 2019-06-18 ENCOUNTER — OFFICE VISIT (OUTPATIENT)
Dept: PAIN MANAGEMENT | Age: 74
End: 2019-06-18
Payer: MEDICARE

## 2019-06-18 VITALS
WEIGHT: 150.79 LBS | HEART RATE: 80 BPM | SYSTOLIC BLOOD PRESSURE: 138 MMHG | BODY MASS INDEX: 25.88 KG/M2 | DIASTOLIC BLOOD PRESSURE: 71 MMHG

## 2019-06-18 DIAGNOSIS — M51.37 DEGENERATION OF LUMBAR OR LUMBOSACRAL INTERVERTEBRAL DISC: ICD-10-CM

## 2019-06-18 DIAGNOSIS — G89.4 CHRONIC PAIN SYNDROME: ICD-10-CM

## 2019-06-18 DIAGNOSIS — M47.26 OSTEOARTHRITIS OF SPINE WITH RADICULOPATHY, LUMBAR REGION: ICD-10-CM

## 2019-06-18 DIAGNOSIS — M47.816 LUMBAR SPONDYLOSIS: ICD-10-CM

## 2019-06-18 DIAGNOSIS — M79.7 FIBROMYALGIA: ICD-10-CM

## 2019-06-18 DIAGNOSIS — M51.36 DDD (DEGENERATIVE DISC DISEASE), LUMBAR: ICD-10-CM

## 2019-06-18 PROCEDURE — 1090F PRES/ABSN URINE INCON ASSESS: CPT | Performed by: INTERNAL MEDICINE

## 2019-06-18 PROCEDURE — G8399 PT W/DXA RESULTS DOCUMENT: HCPCS | Performed by: INTERNAL MEDICINE

## 2019-06-18 PROCEDURE — 1123F ACP DISCUSS/DSCN MKR DOCD: CPT | Performed by: INTERNAL MEDICINE

## 2019-06-18 PROCEDURE — 4040F PNEUMOC VAC/ADMIN/RCVD: CPT | Performed by: INTERNAL MEDICINE

## 2019-06-18 PROCEDURE — G8427 DOCREV CUR MEDS BY ELIG CLIN: HCPCS | Performed by: INTERNAL MEDICINE

## 2019-06-18 PROCEDURE — G8417 CALC BMI ABV UP PARAM F/U: HCPCS | Performed by: INTERNAL MEDICINE

## 2019-06-18 PROCEDURE — 99213 OFFICE O/P EST LOW 20 MIN: CPT | Performed by: INTERNAL MEDICINE

## 2019-06-18 PROCEDURE — 4004F PT TOBACCO SCREEN RCVD TLK: CPT | Performed by: INTERNAL MEDICINE

## 2019-06-18 PROCEDURE — 3017F COLORECTAL CA SCREEN DOC REV: CPT | Performed by: INTERNAL MEDICINE

## 2019-06-18 RX ORDER — OXYCODONE AND ACETAMINOPHEN 7.5; 325 MG/1; MG/1
1 TABLET ORAL EVERY 6 HOURS PRN
Qty: 120 TABLET | Refills: 0 | Status: SHIPPED | OUTPATIENT
Start: 2019-06-18 | End: 2019-07-23 | Stop reason: SDUPTHER

## 2019-06-18 NOTE — PROGRESS NOTES
Denise Escalera  1945  [id number]    HISTORY OF PRESENT ILLNESS:  Ms. Felicita Crain is a 72 y.o. female returns for a follow up visit for multiple medical problems. Her current presenting problems are   1. Chronic pain syndrome    2. Fibromyalgia    3. Degeneration of lumbar or lumbosacral intervertebral disc    4. Lumbar spondylosis    5. Osteoarthritis of spine with radiculopathy, lumbar region    6. DDD (degenerative disc disease), lumbar    7. Gastroesophageal reflux disease without esophagitis    8. Primary insomnia    9. Muscle cramping    10. Drug-induced constipation    11. Moderate episode of recurrent major depressive disorder ([facility] [address]    . As per information/history obtained from the PADT(patient assessment and documentation tool) - She complains of pain in the lower back with radiation to the buttocks, hips Bilateral, ankles Right and feet Right She rates the pain 6/10 and describes it as aching. Pain is made worse by: movement. Current treatment regimen has helped relieve about 40% of the pain. She denies side effects from the current pain regimen. Patient reports that since the last follow up visit the physical functioning is unchanged, family/social relationships are unchanged, mood is unchanged and sleep patterns are unchanged, and that the overall functioning is unchanged. Patient denies neurological bowel or bladder. Patient denies misusing/abusing her narcotic pain medications or using any illegal drugs. There are No indicators for possible drug abuse, addiction or diversion problems. Upon obtaining the medical history from Ms. Felicita Crain regarding today's office visit for her presenting problems, Ms. Felicita Crain states she has been doing fair, pain has been manageable with her regimen. Patient denies any side effects with the medications. She mentions she is using Mobic PRN, she is using Protonix. She says she has been Elavil which is helping with her sleep.  Patient reports she is managing her house baseline breathing. Gastrointestinal: Negative for nausea, vomiting, abdominal pain, diarrhea, constipation, blood in stool and abdominal distention. PHYSICAL EXAM:   Nursing note and vitals reviewed. /71   Pulse 80   Wt 150 lb 12.7 oz (68.4 kg)   BMI 25.88 kg/m²   Constitutional: She appears well-developed and well-nourished. No acute distress. Skin: Skin is warm and dry, good turgor. No rash noted. She is not diaphoretic. Cardiovascular: Normal rate, regular rhythm, normal heart sounds, and does not have murmur. Pulmonary/Chest: Effort normal. No respiratory distress. She does not have wheezes in the lung fields. She has no rales. Decreased air exchange. Neurological/Psychiatric:She is alert and oriented to person, place, and time. Coordination is  normal.  Her mood isAppropriate and affect is Neutral/Euthymic(normal) . IMPRESSION:   1. Chronic pain syndrome    2. Fibromyalgia    3. Degeneration of lumbar or lumbosacral intervertebral disc    4. Lumbar spondylosis    5. Osteoarthritis of spine with radiculopathy, lumbar region    6. DDD (degenerative disc disease), lumbar        PLAN:  Informed verbal consent was obtained  -continue with current opioid regimen   -Adv Biofeedback, relaxation and meditation techniques. Referral to psychologist for CBT was also discussed with patient  -Advised patient to quit smoking for  health related concerns and to improve the treatment outcomes. Education was given on quitting smoking and the use of different modalities including medications, hypnotherapy, counselling  and biofeedback. These were discussed with patient.    -decrease Mobic to PRN      Current Outpatient Medications   Medication Sig Dispense Refill    tiZANidine (ZANAFLEX) 4 MG tablet Take one tablet po BID 60 tablet 1    DULoxetine (CYMBALTA) 60 MG extended release capsule Take one tablet at night 30 capsule 1    amitriptyline (ELAVIL) 25 MG tablet TAKE 1 TABLET BY MOUTH NIGHTLY 30 tablet 1    oxyCODONE-acetaminophen (PERCOCET) 7.5-325 MG per tablet Take 1 tablet by mouth every 6 hours as needed for Pain for up to 35 days. Max 3-4 a day 120 tablet 0    pantoprazole (PROTONIX) 40 MG tablet Take 1 tablet by mouth daily 30 tablet 1    meloxicam (MOBIC) 15 MG tablet TAKE 1 TABLET BY MOUTH DAILY 30 tablet 1    atorvastatin (LIPITOR) 80 MG tablet TAKE 1 TABLET BY MOUTH EVERY DAY FOR CHOLESTEROL 90 tablet 1    cetirizine (ZYRTEC) 10 MG tablet Take 10 mg by mouth daily as needed for Allergies      ipratropium-albuterol (DUONEB) 0.5-2.5 (3) MG/3ML SOLN nebulizer solution Inhale 3 mLs into the lungs every 6 hours 360 mL 1    Nebulizers (COMPRESSOR/NEBULIZER) MISC 1 Units by Does not apply route 4 times daily 1 each 3    pantoprazole (PROTONIX) 20 MG tablet TAKE 1 TABLET BY MOUTH DAILY 30 tablet 5    BREO ELLIPTA 200-25 MCG/INH AEPB INHALE 1 PUFF INTO THE LUNGS DAILY 1 each 5    albuterol sulfate  (90 Base) MCG/ACT inhaler Inhale 2 puffs into the lungs every 6 hours as needed for Shortness of Breath 1 Inhaler 5    gabapentin (NEURONTIN) 600 MG tablet Take 1 tablet by mouth 3 times daily for 30 days. 90 tablet 1     No current facility-administered medications for this visit. I will continue her current medication regimen  which is part of the above treatment schedule. It has been helping with Ms. Javi Tucker chronic  medical problems which for this visit include:   Diagnoses of Chronic pain syndrome, Fibromyalgia, Degeneration of lumbar or lumbosacral intervertebral disc, Lumbar spondylosis, Osteoarthritis of spine with radiculopathy, lumbar region, DDD (degenerative disc disease), lumbar, Gastroesophageal reflux disease without esophagitis, Primary insomnia, Muscle cramping, Drug-induced constipation, and Moderate episode of recurrent major depressive disorder ([facility] [address] were pertinent to this visit.    Risks and benefits of the medications and other alternative treatments  including no treatment were discussed with the patient. The common side effects of these medications were also explained to the patient. Informed verbal consent was obtained. Goals of current treatment regimen include improvement in pain, restoration of functioning- with focus on improvement in physical performance, general activity, work or disability,emotional distress, health care utilization and  decreased medication consumption. Will continue to monitor progress towards achieving/maintaining therapeutic goals with special emphasis on  1. Improvement in perceived interfernce  of pain with ADL's. Ability to do home exercises independently. Ability to do household chores indoor and/or outdoor work and social and leisure activities. Improve psychosocial and physical functioning. - she is showing progression towards this treatment goal with the current regimen. She was advised against drinking alcohol with the narcotic pain medicines, advised against driving or handling machinery while adjusting the dose of medicines or if having cognitive  issues related to the current medications. Risk of overdose and death, if medicines not taken as prescribed, were also discussed. If the patient develops new symptoms or if the symptoms worsen, the patient should call the office. While transcribing every attempt was made to maintain the accuracy of the note in terms of it's contents,there may have been some errors made inadvertently. Thank you for allowing me to participate in the care of this patient. Petra Leong MD.    Cc: HECTOR Mcelroy - Yusuf BOO, scribing for in the presence  of Dr. Petra Leong.   06/18/19  10:58 AM  Art MARTIN  [facility] Assistant    PIYUSH, Dr. Petra Leong, personally performed the services described in this documentation as scribed by  Yusuf Peters MA in my presence and it is both accurate and complete

## 2019-07-05 ENCOUNTER — OFFICE VISIT (OUTPATIENT)
Dept: FAMILY MEDICINE CLINIC | Age: 74
End: 2019-07-05
Payer: MEDICARE

## 2019-07-05 VITALS
HEART RATE: 71 BPM | RESPIRATION RATE: 18 BRPM | DIASTOLIC BLOOD PRESSURE: 70 MMHG | SYSTOLIC BLOOD PRESSURE: 120 MMHG | BODY MASS INDEX: 25.92 KG/M2 | OXYGEN SATURATION: 98 % | WEIGHT: 151 LBS

## 2019-07-05 DIAGNOSIS — K21.9 GASTROESOPHAGEAL REFLUX DISEASE, ESOPHAGITIS PRESENCE NOT SPECIFIED: ICD-10-CM

## 2019-07-05 DIAGNOSIS — E78.00 HYPERCHOLESTEREMIA: ICD-10-CM

## 2019-07-05 DIAGNOSIS — M85.89 OSTEOPENIA OF MULTIPLE SITES: ICD-10-CM

## 2019-07-05 DIAGNOSIS — Z12.11 COLON CANCER SCREENING: ICD-10-CM

## 2019-07-05 DIAGNOSIS — E78.00 HYPERCHOLESTEREMIA: Primary | ICD-10-CM

## 2019-07-05 DIAGNOSIS — R73.03 PREDIABETES: ICD-10-CM

## 2019-07-05 DIAGNOSIS — J44.9 COPD, SEVERE (HCC): ICD-10-CM

## 2019-07-05 DIAGNOSIS — F51.01 PRIMARY INSOMNIA: ICD-10-CM

## 2019-07-05 DIAGNOSIS — Z72.0 TOBACCO ABUSE: ICD-10-CM

## 2019-07-05 DIAGNOSIS — Z12.39 BREAST CANCER SCREENING: ICD-10-CM

## 2019-07-05 LAB
A/G RATIO: 1.9 (ref 1.1–2.2)
ALBUMIN SERPL-MCNC: 4.5 G/DL (ref 3.4–5)
ALP BLD-CCNC: 95 U/L (ref 40–129)
ALT SERPL-CCNC: 10 U/L (ref 10–40)
ANION GAP SERPL CALCULATED.3IONS-SCNC: 11 MMOL/L (ref 3–16)
AST SERPL-CCNC: 13 U/L (ref 15–37)
BASOPHILS ABSOLUTE: 0 K/UL (ref 0–0.2)
BASOPHILS RELATIVE PERCENT: 0.9 %
BILIRUB SERPL-MCNC: 0.4 MG/DL (ref 0–1)
BUN BLDV-MCNC: 14 MG/DL (ref 7–20)
CALCIUM SERPL-MCNC: 9.5 MG/DL (ref 8.3–10.6)
CHLORIDE BLD-SCNC: 103 MMOL/L (ref 99–110)
CHOLESTEROL, TOTAL: 161 MG/DL (ref 0–199)
CO2: 28 MMOL/L (ref 21–32)
CREAT SERPL-MCNC: 0.9 MG/DL (ref 0.6–1.2)
EOSINOPHILS ABSOLUTE: 0.2 K/UL (ref 0–0.6)
EOSINOPHILS RELATIVE PERCENT: 4.2 %
GFR AFRICAN AMERICAN: >60
GFR NON-AFRICAN AMERICAN: >60
GLOBULIN: 2.4 G/DL
GLUCOSE BLD-MCNC: 97 MG/DL (ref 70–99)
HCT VFR BLD CALC: 42 % (ref 36–48)
HDLC SERPL-MCNC: 56 MG/DL (ref 40–60)
HEMOGLOBIN: 13.9 G/DL (ref 12–16)
LDL CHOLESTEROL CALCULATED: 83 MG/DL
LYMPHOCYTES ABSOLUTE: 1.6 K/UL (ref 1–5.1)
LYMPHOCYTES RELATIVE PERCENT: 30.7 %
MCH RBC QN AUTO: 32.1 PG (ref 26–34)
MCHC RBC AUTO-ENTMCNC: 33.2 G/DL (ref 31–36)
MCV RBC AUTO: 96.8 FL (ref 80–100)
MONOCYTES ABSOLUTE: 0.4 K/UL (ref 0–1.3)
MONOCYTES RELATIVE PERCENT: 7.1 %
NEUTROPHILS ABSOLUTE: 3 K/UL (ref 1.7–7.7)
NEUTROPHILS RELATIVE PERCENT: 57.1 %
PDW BLD-RTO: 13.7 % (ref 12.4–15.4)
PLATELET # BLD: 311 K/UL (ref 135–450)
PMV BLD AUTO: 7.6 FL (ref 5–10.5)
POTASSIUM SERPL-SCNC: 4.7 MMOL/L (ref 3.5–5.1)
RBC # BLD: 4.34 M/UL (ref 4–5.2)
SODIUM BLD-SCNC: 142 MMOL/L (ref 136–145)
TOTAL PROTEIN: 6.9 G/DL (ref 6.4–8.2)
TRIGL SERPL-MCNC: 109 MG/DL (ref 0–150)
VLDLC SERPL CALC-MCNC: 22 MG/DL
WBC # BLD: 5.3 K/UL (ref 4–11)

## 2019-07-05 PROCEDURE — G8399 PT W/DXA RESULTS DOCUMENT: HCPCS | Performed by: NURSE PRACTITIONER

## 2019-07-05 PROCEDURE — 99214 OFFICE O/P EST MOD 30 MIN: CPT | Performed by: NURSE PRACTITIONER

## 2019-07-05 PROCEDURE — 4004F PT TOBACCO SCREEN RCVD TLK: CPT | Performed by: NURSE PRACTITIONER

## 2019-07-05 PROCEDURE — 3023F SPIROM DOC REV: CPT | Performed by: NURSE PRACTITIONER

## 2019-07-05 PROCEDURE — 3017F COLORECTAL CA SCREEN DOC REV: CPT | Performed by: NURSE PRACTITIONER

## 2019-07-05 PROCEDURE — 1090F PRES/ABSN URINE INCON ASSESS: CPT | Performed by: NURSE PRACTITIONER

## 2019-07-05 PROCEDURE — 1123F ACP DISCUSS/DSCN MKR DOCD: CPT | Performed by: NURSE PRACTITIONER

## 2019-07-05 PROCEDURE — G8417 CALC BMI ABV UP PARAM F/U: HCPCS | Performed by: NURSE PRACTITIONER

## 2019-07-05 PROCEDURE — G8427 DOCREV CUR MEDS BY ELIG CLIN: HCPCS | Performed by: NURSE PRACTITIONER

## 2019-07-05 PROCEDURE — 4040F PNEUMOC VAC/ADMIN/RCVD: CPT | Performed by: NURSE PRACTITIONER

## 2019-07-05 PROCEDURE — G8926 SPIRO NO PERF OR DOC: HCPCS | Performed by: NURSE PRACTITIONER

## 2019-07-05 ASSESSMENT — ENCOUNTER SYMPTOMS
WHEEZING: 0
CHEST TIGHTNESS: 0
EYE ITCHING: 0
SHORTNESS OF BREATH: 1
SINUS PAIN: 0
ABDOMINAL PAIN: 0
COUGH: 0

## 2019-07-05 NOTE — PROGRESS NOTES
syndrome)BILATERAL-s/p surgical repair--resolved     Postmenopausal status-last mammogram 2/15 wnl last pap 12/13--wnl    Nondependent alcohol abuse, in remission    Urticaria, chronic    Tobacco abuse-advised to quit--lung cancer screening neg 5/18--repeat low dose ct 1 yr    Chronic back pain-(djd) saw dr Laina Velasco afford surgery--seeing dr Mabel Barron for pain meds    Degeneration of lumbar or lumbosacral intervertebral disc    Fibromyalgia    Hypercholesteremia    Lumbar spondylosis    Vitamin D deficiency--severe--started 50,000 iu 2x/ wk 11/13    Insomnia--on elevil w help    Constipation--on daily miralax    Prediabetes--a1c was 6.3 1/16--diet advised    Depression    Chronic pain syndrome    Muscle cramping    ROLY (acute kidney injury) ([address]    Sepsis ([address]    Gastroesophageal reflux disease    COPD, severe ([address]    Chronic hypoxemic respiratory failure (HCC)    Degeneration of lumbar or lumbosacral intervertebral disc    Trochanteric bursitis of right hip    COPD exacerbation (HCC)          Current Outpatient Medications   Medication Sig Dispense Refill    oxyCODONE-acetaminophen (PERCOCET) 7.5-325 MG per tablet Take 1 tablet by mouth every 6 hours as needed for Pain for up to 35 days.  Max 3-4 a day 120 tablet 0    tiZANidine (ZANAFLEX) 4 MG tablet Take one tablet po BID 60 tablet 1    DULoxetine (CYMBALTA) 60 MG extended release capsule Take one tablet at night 30 capsule 1    amitriptyline (ELAVIL) 25 MG tablet TAKE 1 TABLET BY MOUTH NIGHTLY 30 tablet 1    pantoprazole (PROTONIX) 40 MG tablet Take 1 tablet by mouth daily 30 tablet 1    meloxicam (MOBIC) 15 MG tablet TAKE 1 TABLET BY MOUTH DAILY 30 tablet 1    atorvastatin (LIPITOR) 80 MG tablet TAKE 1 TABLET BY MOUTH EVERY DAY FOR CHOLESTEROL 90 tablet 1    cetirizine (ZYRTEC) 10 MG tablet Take 10 mg by mouth daily as needed for Allergies      ipratropium-albuterol (DUONEB) 0.5-2.5 (3) MG/3ML SOLN nebulizer solution Inhale 3 mLs into the lungs every 6 hours 360 mL 1    Nebulizers (COMPRESSOR/NEBULIZER) MISC 1 Units by Does not apply route 4 times daily 1 each 3    BREO ELLIPTA 200-25 MCG/INH AEPB INHALE 1 PUFF INTO THE LUNGS DAILY 1 each 5    albuterol sulfate  (90 Base) MCG/ACT inhaler Inhale 2 puffs into the lungs every 6 hours as needed for Shortness of Breath 1 Inhaler 5    gabapentin (NEURONTIN) 600 MG tablet Take 1 tablet by mouth 3 times daily for 30 days. 90 tablet 1     No current facility-administered medications for this visit. No Known Allergies    Review of Systems   Constitutional: Negative for fatigue and fever. HENT: Negative for congestion and sinus pain. Eyes: Negative for itching. Respiratory: Positive for shortness of breath (no worse than baseline). Negative for cough, chest tightness and wheezing. Cardiovascular: Negative for chest pain, palpitations and leg swelling. Gastrointestinal: Negative for abdominal pain. Musculoskeletal: Negative for arthralgias. Neurological: Negative for weakness, numbness and headaches. Psychiatric/Behavioral: Negative for agitation. All other systems reviewed and are negative. Vitals:    07/05/19 0959   BP: 120/70   Site: Right Upper Arm   Position: Sitting   Cuff Size: Medium Adult   Pulse: 71   Resp: 18   SpO2: 98%   Weight: 151 lb (68.5 kg)       Body mass index is 25.92 kg/m². Wt Readings from Last 3 Encounters:   07/05/19 151 lb (68.5 kg)   06/18/19 150 lb 12.7 oz (68.4 kg)   05/15/19 152 lb (68.9 kg)       BP Readings from Last 3 Encounters:   07/05/19 120/70   06/18/19 138/71   05/15/19 (!) 148/78       Physical Exam   Constitutional: She is oriented to person, place, and time. She appears well-developed and well-nourished. No distress. HENT:   Head: Normocephalic and atraumatic. Eyes: EOM are normal.   Neck: Normal range of motion. Neck supple. No thyromegaly present.    Cardiovascular: Normal rate, regular rhythm, discussed, patient was unable to quit with Chantix. Advised patient smoking cessation is needed, although she has no desire to quit at this time. Colon cancer screening  -     POCT Fecal Immunochemical Test (FIT); Future  Last colonoscopy 4/2011 with rectal polyps, was advised to repeat colonoscopy in 5 years. Discussed the importance of colonoscopy for colon cancer screening. Patient refused referral for colonoscopy, would like to proceed with FIT test.    Breast cancer screening  -     KEHINDE DIGITAL SCREEN W OR WO CAD BILATERAL; Future  Last mammogram 11/2017 and negative - advised to have mammogram performed annually. Advised patient to schedule mammogram for this year. Patient seen along with ORLIN Dunlap student    Return in about 6 months (around 1/5/2020), or if symptoms worsen or fail to improve, for chronic conditions.

## 2019-07-06 LAB
ESTIMATED AVERAGE GLUCOSE: 128.4 MG/DL
HBA1C MFR BLD: 6.1 %

## 2019-07-07 LAB — VITAMIN D 25-HYDROXY: 22.7 NG/ML

## 2019-07-09 DIAGNOSIS — J44.9 COPD, SEVERE (HCC): ICD-10-CM

## 2019-07-11 ENCOUNTER — TELEPHONE (OUTPATIENT)
Dept: FAMILY MEDICINE CLINIC | Age: 74
End: 2019-07-11

## 2019-07-12 RX ORDER — CHOLECALCIFEROL (VITAMIN D3) 50 MCG
2000 TABLET ORAL DAILY
Qty: 30 TABLET | COMMUNITY
Start: 2019-07-12

## 2019-07-23 ENCOUNTER — OFFICE VISIT (OUTPATIENT)
Dept: PAIN MANAGEMENT | Age: 74
End: 2019-07-23
Payer: MEDICARE

## 2019-07-23 VITALS
HEART RATE: 76 BPM | WEIGHT: 150 LBS | BODY MASS INDEX: 25.75 KG/M2 | SYSTOLIC BLOOD PRESSURE: 133 MMHG | DIASTOLIC BLOOD PRESSURE: 72 MMHG

## 2019-07-23 DIAGNOSIS — F33.1 MODERATE EPISODE OF RECURRENT MAJOR DEPRESSIVE DISORDER (HCC): ICD-10-CM

## 2019-07-23 DIAGNOSIS — R25.2 MUSCLE CRAMPING: ICD-10-CM

## 2019-07-23 DIAGNOSIS — F51.01 PRIMARY INSOMNIA: ICD-10-CM

## 2019-07-23 DIAGNOSIS — K59.03 DRUG-INDUCED CONSTIPATION: ICD-10-CM

## 2019-07-23 DIAGNOSIS — G89.4 CHRONIC PAIN SYNDROME: ICD-10-CM

## 2019-07-23 DIAGNOSIS — M51.37 DEGENERATION OF LUMBAR OR LUMBOSACRAL INTERVERTEBRAL DISC: ICD-10-CM

## 2019-07-23 DIAGNOSIS — M47.26 OSTEOARTHRITIS OF SPINE WITH RADICULOPATHY, LUMBAR REGION: ICD-10-CM

## 2019-07-23 DIAGNOSIS — M47.816 LUMBAR SPONDYLOSIS: ICD-10-CM

## 2019-07-23 DIAGNOSIS — M79.7 FIBROMYALGIA: ICD-10-CM

## 2019-07-23 DIAGNOSIS — K21.9 GASTROESOPHAGEAL REFLUX DISEASE WITHOUT ESOPHAGITIS: ICD-10-CM

## 2019-07-23 DIAGNOSIS — M51.36 DDD (DEGENERATIVE DISC DISEASE), LUMBAR: ICD-10-CM

## 2019-07-23 PROCEDURE — 99214 OFFICE O/P EST MOD 30 MIN: CPT | Performed by: INTERNAL MEDICINE

## 2019-07-23 PROCEDURE — G8399 PT W/DXA RESULTS DOCUMENT: HCPCS | Performed by: INTERNAL MEDICINE

## 2019-07-23 PROCEDURE — G8427 DOCREV CUR MEDS BY ELIG CLIN: HCPCS | Performed by: INTERNAL MEDICINE

## 2019-07-23 PROCEDURE — 4004F PT TOBACCO SCREEN RCVD TLK: CPT | Performed by: INTERNAL MEDICINE

## 2019-07-23 PROCEDURE — 20553 NJX 1/MLT TRIGGER POINTS 3/>: CPT | Performed by: INTERNAL MEDICINE

## 2019-07-23 PROCEDURE — 3017F COLORECTAL CA SCREEN DOC REV: CPT | Performed by: INTERNAL MEDICINE

## 2019-07-23 PROCEDURE — 1090F PRES/ABSN URINE INCON ASSESS: CPT | Performed by: INTERNAL MEDICINE

## 2019-07-23 PROCEDURE — 4040F PNEUMOC VAC/ADMIN/RCVD: CPT | Performed by: INTERNAL MEDICINE

## 2019-07-23 PROCEDURE — G8417 CALC BMI ABV UP PARAM F/U: HCPCS | Performed by: INTERNAL MEDICINE

## 2019-07-23 PROCEDURE — 1123F ACP DISCUSS/DSCN MKR DOCD: CPT | Performed by: INTERNAL MEDICINE

## 2019-07-23 RX ORDER — OXYCODONE AND ACETAMINOPHEN 7.5; 325 MG/1; MG/1
1 TABLET ORAL EVERY 6 HOURS PRN
Qty: 100 TABLET | Refills: 0 | Status: SHIPPED | OUTPATIENT
Start: 2019-07-23 | End: 2019-08-20 | Stop reason: SDUPTHER

## 2019-07-23 RX ORDER — TRIAMCINOLONE ACETONIDE 40 MG/ML
40 INJECTION, SUSPENSION INTRA-ARTICULAR; INTRAMUSCULAR ONCE
Status: COMPLETED | OUTPATIENT
Start: 2019-07-23 | End: 2019-07-23

## 2019-07-23 RX ORDER — GABAPENTIN 600 MG/1
600 TABLET ORAL 3 TIMES DAILY
Qty: 90 TABLET | Refills: 1 | Status: SHIPPED | OUTPATIENT
Start: 2019-07-23 | End: 2019-08-20 | Stop reason: SDUPTHER

## 2019-07-23 RX ORDER — DULOXETIN HYDROCHLORIDE 60 MG/1
CAPSULE, DELAYED RELEASE ORAL
Qty: 30 CAPSULE | Refills: 1 | Status: SHIPPED | OUTPATIENT
Start: 2019-07-23 | End: 2019-08-20 | Stop reason: SDUPTHER

## 2019-07-23 RX ADMIN — TRIAMCINOLONE ACETONIDE 40 MG: 40 INJECTION, SUSPENSION INTRA-ARTICULAR; INTRAMUSCULAR at 12:09

## 2019-07-23 NOTE — PROGRESS NOTES
water brash-GERD Sxs are controlled  with the medications, she is using Protonix which helps, she is on Mobic 3 times a week. She reports she is trying to manage her ADL's/house chores. Patient reports her weight has been stable. Patient's  subjective report of her mood is fair. she describes occasional symptoms of depression, occasional  irritability and some mood swings. Describes her mood as being neutral and reports some pleasure in her daily activities. Reports  fair  appetite, energy and concentration. Able to function well in different aspects of her daily activities. Denies suicidal or homicidal ideation. Denies any complaints of increased tension, does   Worry sometimes and occasional  irritability  she denies any c/o increased anxiety, No c/o panic attacks or symptoms of PTSD. Patient states her sleep is fair. Has fairly normal sleep latency. Averages about 4-6 hours of sleep a night. Denies any signs of sleep apnea. Feels somewhat rested in the morning. ALLERGIES/PAST MED/FAM/SOC HISTORY: Ms. Ha Hebert allergies, past medical, family and social history were reviewed in the chart and also listed below.   Social History     Socioeconomic History    Marital status: Single     Spouse name: Not on file    Number of children: 3    Years of education: Not on file    Highest education level: Not on file   Occupational History    Occupation: retired---[employer]   Social Needs    Financial resource strain: Not on file    Food insecurity:     Worry: Not on file     Inability: Not on file   [employer] needs:     Medical: Not on file     Non-medical: Not on file   Tobacco Use    Smoking status: Current Every Day Smoker     Packs/day: 1.50     Years: 55.00     Pack years: 82.50     Types: Cigarettes     Start date: 1/1/1962    Smokeless tobacco: Never Used    Tobacco comment: thinking of quitting   Substance and Sexual Activity    Alcohol use: No     Alcohol/week: 0.0 standard drinks    Drug

## 2019-07-25 ENCOUNTER — TELEPHONE (OUTPATIENT)
Dept: PAIN MANAGEMENT | Age: 74
End: 2019-07-25

## 2019-07-25 DIAGNOSIS — M47.816 LUMBAR SPONDYLOSIS: ICD-10-CM

## 2019-07-25 DIAGNOSIS — M47.26 OSTEOARTHRITIS OF SPINE WITH RADICULOPATHY, LUMBAR REGION: ICD-10-CM

## 2019-07-25 DIAGNOSIS — M51.36 DDD (DEGENERATIVE DISC DISEASE), LUMBAR: ICD-10-CM

## 2019-07-25 DIAGNOSIS — M79.7 FIBROMYALGIA: ICD-10-CM

## 2019-07-25 DIAGNOSIS — G89.4 CHRONIC PAIN SYNDROME: ICD-10-CM

## 2019-07-25 DIAGNOSIS — M51.37 DEGENERATION OF LUMBAR OR LUMBOSACRAL INTERVERTEBRAL DISC: ICD-10-CM

## 2019-07-25 RX ORDER — PANTOPRAZOLE SODIUM 40 MG/1
40 TABLET, DELAYED RELEASE ORAL DAILY
Qty: 30 TABLET | Refills: 1 | Status: SHIPPED | OUTPATIENT
Start: 2019-07-25 | End: 2019-08-20 | Stop reason: SDUPTHER

## 2019-07-29 RX ORDER — PANTOPRAZOLE SODIUM 40 MG/1
40 TABLET, DELAYED RELEASE ORAL DAILY
Qty: 90 TABLET | Refills: 1 | OUTPATIENT
Start: 2019-07-29

## 2019-08-20 ENCOUNTER — OFFICE VISIT (OUTPATIENT)
Dept: PAIN MANAGEMENT | Age: 74
End: 2019-08-20
Payer: MEDICARE

## 2019-08-20 VITALS
BODY MASS INDEX: 26.09 KG/M2 | SYSTOLIC BLOOD PRESSURE: 96 MMHG | HEART RATE: 94 BPM | WEIGHT: 152 LBS | DIASTOLIC BLOOD PRESSURE: 59 MMHG

## 2019-08-20 DIAGNOSIS — G89.4 CHRONIC PAIN SYNDROME: ICD-10-CM

## 2019-08-20 DIAGNOSIS — M47.26 OSTEOARTHRITIS OF SPINE WITH RADICULOPATHY, LUMBAR REGION: ICD-10-CM

## 2019-08-20 DIAGNOSIS — M51.37 DEGENERATION OF LUMBAR OR LUMBOSACRAL INTERVERTEBRAL DISC: ICD-10-CM

## 2019-08-20 DIAGNOSIS — M51.36 DDD (DEGENERATIVE DISC DISEASE), LUMBAR: ICD-10-CM

## 2019-08-20 DIAGNOSIS — M79.7 FIBROMYALGIA: ICD-10-CM

## 2019-08-20 DIAGNOSIS — M70.61 TROCHANTERIC BURSITIS OF RIGHT HIP: ICD-10-CM

## 2019-08-20 DIAGNOSIS — M47.816 LUMBAR SPONDYLOSIS: ICD-10-CM

## 2019-08-20 PROCEDURE — 1090F PRES/ABSN URINE INCON ASSESS: CPT | Performed by: INTERNAL MEDICINE

## 2019-08-20 PROCEDURE — 4004F PT TOBACCO SCREEN RCVD TLK: CPT | Performed by: INTERNAL MEDICINE

## 2019-08-20 PROCEDURE — 99213 OFFICE O/P EST LOW 20 MIN: CPT | Performed by: INTERNAL MEDICINE

## 2019-08-20 PROCEDURE — 1123F ACP DISCUSS/DSCN MKR DOCD: CPT | Performed by: INTERNAL MEDICINE

## 2019-08-20 PROCEDURE — G8399 PT W/DXA RESULTS DOCUMENT: HCPCS | Performed by: INTERNAL MEDICINE

## 2019-08-20 PROCEDURE — 3017F COLORECTAL CA SCREEN DOC REV: CPT | Performed by: INTERNAL MEDICINE

## 2019-08-20 PROCEDURE — 4040F PNEUMOC VAC/ADMIN/RCVD: CPT | Performed by: INTERNAL MEDICINE

## 2019-08-20 PROCEDURE — G8417 CALC BMI ABV UP PARAM F/U: HCPCS | Performed by: INTERNAL MEDICINE

## 2019-08-20 PROCEDURE — G8427 DOCREV CUR MEDS BY ELIG CLIN: HCPCS | Performed by: INTERNAL MEDICINE

## 2019-08-20 RX ORDER — GABAPENTIN 600 MG/1
600 TABLET ORAL 3 TIMES DAILY
Qty: 90 TABLET | Refills: 1 | Status: SHIPPED | OUTPATIENT
Start: 2019-08-20 | End: 2019-11-13 | Stop reason: SDUPTHER

## 2019-08-20 RX ORDER — PANTOPRAZOLE SODIUM 40 MG/1
40 TABLET, DELAYED RELEASE ORAL DAILY
Qty: 30 TABLET | Refills: 1 | Status: SHIPPED | OUTPATIENT
Start: 2019-08-20 | End: 2019-10-15 | Stop reason: SDUPTHER

## 2019-08-20 RX ORDER — DULOXETIN HYDROCHLORIDE 60 MG/1
CAPSULE, DELAYED RELEASE ORAL
Qty: 30 CAPSULE | Refills: 1 | Status: SHIPPED | OUTPATIENT
Start: 2019-08-20 | End: 2019-10-15 | Stop reason: SDUPTHER

## 2019-08-20 RX ORDER — OXYCODONE AND ACETAMINOPHEN 7.5; 325 MG/1; MG/1
1 TABLET ORAL EVERY 6 HOURS PRN
Qty: 112 TABLET | Refills: 0 | Status: SHIPPED | OUTPATIENT
Start: 2019-08-20 | End: 2019-09-17 | Stop reason: SDUPTHER

## 2019-08-20 RX ORDER — MELOXICAM 15 MG/1
TABLET ORAL
Qty: 30 TABLET | Refills: 1 | Status: SHIPPED | OUTPATIENT
Start: 2019-08-20 | End: 2019-10-15 | Stop reason: SDUPTHER

## 2019-08-20 RX ORDER — AMITRIPTYLINE HYDROCHLORIDE 25 MG/1
TABLET, FILM COATED ORAL
Qty: 30 TABLET | Refills: 1 | Status: SHIPPED | OUTPATIENT
Start: 2019-08-20 | End: 2019-10-15 | Stop reason: SDUPTHER

## 2019-08-20 RX ORDER — TIZANIDINE 4 MG/1
TABLET ORAL
Qty: 60 TABLET | Refills: 1 | Status: SHIPPED | OUTPATIENT
Start: 2019-08-20 | End: 2019-10-15 | Stop reason: SDUPTHER

## 2019-08-20 NOTE — PROGRESS NOTES
Edmar Oreilly  1945  [id number]    HISTORY OF PRESENT ILLNESS:  Ms. Beck Clayton is a 69 y.o. female returns for a follow up visit for multiple medical problems. Her current presenting problems are   1. Chronic pain syndrome    2. Moderate episode of recurrent major depressive disorder ([address]    3. Drug-induced constipation    4. Degeneration of lumbar or lumbosacral intervertebral disc    5. Trochanteric bursitis of right hip    6. Fibromyalgia    7. Lumbar spondylosis    8. Osteoarthritis of spine with radiculopathy, lumbar region    9. DDD (degenerative disc disease), lumbar    10. Gastroesophageal reflux disease without esophagitis    11. Primary insomnia    12. Muscle cramping    . As per information/history obtained from the PADT(patient assessment and documentation tool) - She complains of pain in the lower back with radiation to the buttocks She rates the pain 5/10 and describes it as aching. Pain is made worse by: standing. Current treatment regimen has helped relieve about 40% of the pain. She denies side effects from the current pain regimen. Patient reports that since the last follow up visit the physical functioning is worse, family/social relationships are unchanged, mood is unchanged and sleep patterns are unchanged, and that the overall functioning is worse. Patient denies neurological bowel or bladder. Patient denies misusing/abusing her narcotic pain medications or using any illegal drugs. There are No indicators for possible drug abuse, addiction or diversion problems. Upon obtaining the medical history from Ms. Beck Clayton regarding today's office visit for her presenting problems,  Patient complains she is having problems with her back and buttock on the right side. She states its feels like something's  pulling in the back and leg area. She mentions she twisted her back.  denies any side effects with the medications.  She says she is using Percocet 3-4 per day, but feels she needs it tightness and shortness of breath or change in baseline breathing. Gastrointestinal: Negative for nausea, vomiting, abdominal pain, diarrhea, constipation, blood in stool and abdominal distention. PHYSICAL EXAM:   Nursing note and vitals reviewed. BP (!) 96/59   Pulse 94   Wt 152 lb (68.9 kg)   BMI 26.09 kg/m²   Constitutional: She appears well-developed and well-nourished. No acute distress. Skin: Skin is warm and dry, good turgor. No rash noted. She is not diaphoretic. Cardiovascular: Normal rate, regular rhythm, normal heart sounds, and does not have murmur. Pulmonary/Chest: Effort normal. No respiratory distress. She does not have wheezes in the lung fields. She has no rales. Neurological/Psychiatric:She is alert and oriented to person, place, and time. Coordination is  normal.  Her mood isAppropriate and affect is Flat/blunted and Anxious . IMPRESSION:   1. Chronic pain syndrome    2. Degeneration of lumbar or lumbosacral intervertebral disc    3. Trochanteric bursitis of right hip    4. Fibromyalgia    5. Lumbar spondylosis    6. Osteoarthritis of spine with radiculopathy, lumbar region    7. DDD (degenerative disc disease), lumbar        PLAN:  Informed verbal consent was obtained  -Continue with Mobic, start po daily for 2 weeks   -Simeon exercise given   -Continue all adjuvant medications   -If symptoms continue consider a TPI   - She was advised weight reduction, diet changes- 800-1200 kira diet, diet diary, exercising, nutritional  consult increased physical activity as tolerated   -Advised patient to quit smoking for  health related concerns and to improve the treatment outcomes. Education was given on quitting smoking and the use of different modalities including medications, hypnotherapy, counselling  and biofeedback. These were discussed with patient.    Current Outpatient Medications   Medication Sig Dispense Refill    pantoprazole (PROTONIX) 40 MG tablet Take 1 tablet by

## 2019-08-23 RX ORDER — AMITRIPTYLINE HYDROCHLORIDE 25 MG/1
TABLET, FILM COATED ORAL
Qty: 90 TABLET | Refills: 1 | OUTPATIENT
Start: 2019-08-23

## 2019-08-29 ENCOUNTER — HOSPITAL ENCOUNTER (OUTPATIENT)
Dept: WOMENS IMAGING | Age: 74
Discharge: HOME | End: 2019-08-29
Payer: MEDICARE

## 2019-08-29 ENCOUNTER — HOSPITAL ENCOUNTER (OUTPATIENT)
Dept: CT IMAGING | Age: 74
Discharge: HOME | End: 2019-08-29
Payer: MEDICARE

## 2019-08-29 DIAGNOSIS — Z12.39 BREAST CANCER SCREENING: ICD-10-CM

## 2019-08-29 DIAGNOSIS — Z87.891 PERSONAL HISTORY OF TOBACCO USE: ICD-10-CM

## 2019-08-29 PROCEDURE — G0297 LDCT FOR LUNG CA SCREEN: HCPCS

## 2019-08-29 PROCEDURE — 77067 SCR MAMMO BI INCL CAD: CPT

## 2019-09-04 ENCOUNTER — APPOINTMENT (OUTPATIENT)
Dept: GENERAL RADIOLOGY | Age: 74
End: 2019-09-04
Payer: MEDICARE

## 2019-09-04 ENCOUNTER — APPOINTMENT (OUTPATIENT)
Dept: CT IMAGING | Age: 74
End: 2019-09-04
Payer: MEDICARE

## 2019-09-04 ENCOUNTER — HOSPITAL ENCOUNTER (EMERGENCY)
Age: 74
Discharge: HOME | End: 2019-09-05
Attending: EMERGENCY MEDICINE
Payer: MEDICARE

## 2019-09-04 DIAGNOSIS — S20.212A CHEST WALL CONTUSION, LEFT, INITIAL ENCOUNTER: Primary | ICD-10-CM

## 2019-09-04 DIAGNOSIS — J44.9 CHRONIC OBSTRUCTIVE PULMONARY DISEASE, UNSPECIFIED COPD TYPE (HCC): ICD-10-CM

## 2019-09-04 DIAGNOSIS — N30.01 ACUTE CYSTITIS WITH HEMATURIA: ICD-10-CM

## 2019-09-04 LAB
A/G RATIO: 1.2 (ref 1.1–2.2)
ALBUMIN SERPL-MCNC: 4.2 G/DL (ref 3.4–5)
ALP BLD-CCNC: 107 U/L (ref 40–129)
ALT SERPL-CCNC: 10 U/L (ref 10–40)
ANION GAP SERPL CALCULATED.3IONS-SCNC: 16 MMOL/L (ref 3–16)
AST SERPL-CCNC: 12 U/L (ref 15–37)
BASE EXCESS VENOUS: 1.2 MMOL/L
BASOPHILS ABSOLUTE: 0 K/UL (ref 0–0.2)
BASOPHILS RELATIVE PERCENT: 0.2 %
BILIRUB SERPL-MCNC: 0.9 MG/DL (ref 0–1)
BILIRUBIN URINE: ABNORMAL
BLOOD, URINE: ABNORMAL
BUN BLDV-MCNC: 9 MG/DL (ref 7–20)
CALCIUM SERPL-MCNC: 9.4 MG/DL (ref 8.3–10.6)
CARBOXYHEMOGLOBIN: 2.5 %
CHLORIDE BLD-SCNC: 95 MMOL/L (ref 99–110)
CLARITY: CLEAR
CO2: 23 MMOL/L (ref 21–32)
COLOR: ABNORMAL
CREAT SERPL-MCNC: 0.6 MG/DL (ref 0.6–1.2)
EOSINOPHILS ABSOLUTE: 0 K/UL (ref 0–0.6)
EOSINOPHILS RELATIVE PERCENT: 0.1 %
EPITHELIAL CELLS, UA: 2 /HPF (ref 0–5)
ETHANOL: NORMAL MG/DL (ref 0–0.08)
GFR AFRICAN AMERICAN: >60
GFR NON-AFRICAN AMERICAN: >60
GLOBULIN: 3.5 G/DL
GLUCOSE BLD-MCNC: 137 MG/DL (ref 70–99)
GLUCOSE URINE: NEGATIVE MG/DL
HCO3 VENOUS: 26 MMOL/L (ref 23–29)
HCT VFR BLD CALC: 41.7 % (ref 36–48)
HEMOGLOBIN: 14.3 G/DL (ref 12–16)
HYALINE CASTS: 1 /LPF (ref 0–8)
KETONES, URINE: 40 MG/DL
LEUKOCYTE ESTERASE, URINE: NEGATIVE
LYMPHOCYTES ABSOLUTE: 0.6 K/UL (ref 1–5.1)
LYMPHOCYTES RELATIVE PERCENT: 4.2 %
MCH RBC QN AUTO: 32.1 PG (ref 26–34)
MCHC RBC AUTO-ENTMCNC: 34.2 G/DL (ref 31–36)
MCV RBC AUTO: 93.8 FL (ref 80–100)
METHEMOGLOBIN VENOUS: 0.7 %
MICROSCOPIC EXAMINATION: YES
MONOCYTES ABSOLUTE: 0.9 K/UL (ref 0–1.3)
MONOCYTES RELATIVE PERCENT: 6.1 %
NEUTROPHILS ABSOLUTE: 13 K/UL (ref 1.7–7.7)
NEUTROPHILS RELATIVE PERCENT: 89.4 %
NITRITE, URINE: NEGATIVE
O2 CONTENT, VEN: 20 ML/DL
O2 SAT, VEN: 99 %
O2 THERAPY: NORMAL
PCO2, VEN: 42 MMHG (ref 40–50)
PDW BLD-RTO: 14.5 % (ref 12.4–15.4)
PH UA: 6 (ref 5–8)
PH VENOUS: 7.4 (ref 7.35–7.45)
PLATELET # BLD: 266 K/UL (ref 135–450)
PMV BLD AUTO: 6.9 FL (ref 5–10.5)
PO2, VEN: 145 MMHG
POTASSIUM REFLEX MAGNESIUM: 3.7 MMOL/L (ref 3.5–5.1)
PRO-BNP: 619 PG/ML (ref 0–124)
PROTEIN UA: 100 MG/DL
RBC # BLD: 4.44 M/UL (ref 4–5.2)
RBC UA: 84 /HPF (ref 0–4)
SODIUM BLD-SCNC: 134 MMOL/L (ref 136–145)
SPECIFIC GRAVITY UA: 1.02 (ref 1–1.03)
TCO2 CALC VENOUS: 27 MMOL/L
TOTAL PROTEIN: 7.7 G/DL (ref 6.4–8.2)
TROPONIN: <0.01 NG/ML
URINE REFLEX TO CULTURE: ABNORMAL
URINE TYPE: ABNORMAL
UROBILINOGEN, URINE: 4 E.U./DL
WBC # BLD: 14.5 K/UL (ref 4–11)
WBC UA: 4 /HPF (ref 0–5)

## 2019-09-04 PROCEDURE — 2580000003 HC RX 258: Performed by: EMERGENCY MEDICINE

## 2019-09-04 PROCEDURE — 36415 COLL VENOUS BLD VENIPUNCTURE: CPT

## 2019-09-04 PROCEDURE — 71260 CT THORAX DX C+: CPT

## 2019-09-04 PROCEDURE — 99285 EMERGENCY DEPT VISIT HI MDM: CPT

## 2019-09-04 PROCEDURE — 93005 ELECTROCARDIOGRAM TRACING: CPT | Performed by: EMERGENCY MEDICINE

## 2019-09-04 PROCEDURE — 84484 ASSAY OF TROPONIN QUANT: CPT

## 2019-09-04 PROCEDURE — 83880 ASSAY OF NATRIURETIC PEPTIDE: CPT

## 2019-09-04 PROCEDURE — 6360000004 HC RX CONTRAST MEDICATION: Performed by: EMERGENCY MEDICINE

## 2019-09-04 PROCEDURE — 81001 URINALYSIS AUTO W/SCOPE: CPT

## 2019-09-04 PROCEDURE — 71101 X-RAY EXAM UNILAT RIBS/CHEST: CPT

## 2019-09-04 PROCEDURE — 6370000000 HC RX 637 (ALT 250 FOR IP): Performed by: EMERGENCY MEDICINE

## 2019-09-04 PROCEDURE — 82803 BLOOD GASES ANY COMBINATION: CPT

## 2019-09-04 PROCEDURE — 96361 HYDRATE IV INFUSION ADD-ON: CPT

## 2019-09-04 PROCEDURE — 80053 COMPREHEN METABOLIC PANEL: CPT

## 2019-09-04 PROCEDURE — 6360000002 HC RX W HCPCS: Performed by: EMERGENCY MEDICINE

## 2019-09-04 PROCEDURE — G0480 DRUG TEST DEF 1-7 CLASSES: HCPCS

## 2019-09-04 PROCEDURE — 85025 COMPLETE CBC W/AUTO DIFF WBC: CPT

## 2019-09-04 PROCEDURE — 96375 TX/PRO/DX INJ NEW DRUG ADDON: CPT

## 2019-09-04 PROCEDURE — 94640 AIRWAY INHALATION TREATMENT: CPT

## 2019-09-04 PROCEDURE — 72125 CT NECK SPINE W/O DYE: CPT

## 2019-09-04 PROCEDURE — 70450 CT HEAD/BRAIN W/O DYE: CPT

## 2019-09-04 PROCEDURE — 96365 THER/PROPH/DIAG IV INF INIT: CPT

## 2019-09-04 RX ORDER — METHYLPREDNISOLONE SODIUM SUCCINATE 125 MG/2ML
125 INJECTION, POWDER, LYOPHILIZED, FOR SOLUTION INTRAMUSCULAR; INTRAVENOUS ONCE
Status: COMPLETED | OUTPATIENT
Start: 2019-09-04 | End: 2019-09-04

## 2019-09-04 RX ORDER — IPRATROPIUM BROMIDE AND ALBUTEROL SULFATE 2.5; .5 MG/3ML; MG/3ML
1 SOLUTION RESPIRATORY (INHALATION) ONCE
Status: COMPLETED | OUTPATIENT
Start: 2019-09-04 | End: 2019-09-04

## 2019-09-04 RX ORDER — 0.9 % SODIUM CHLORIDE 0.9 %
1000 INTRAVENOUS SOLUTION INTRAVENOUS ONCE
Status: COMPLETED | OUTPATIENT
Start: 2019-09-04 | End: 2019-09-05

## 2019-09-04 RX ORDER — PREDNISONE 20 MG/1
40 TABLET ORAL DAILY
Qty: 10 TABLET | Refills: 0 | Status: SHIPPED | OUTPATIENT
Start: 2019-09-04 | End: 2019-09-09

## 2019-09-04 RX ORDER — IPRATROPIUM BROMIDE AND ALBUTEROL SULFATE 2.5; .5 MG/3ML; MG/3ML
SOLUTION RESPIRATORY (INHALATION)
Qty: 360 ML | Refills: 3 | Status: SHIPPED | OUTPATIENT
Start: 2019-09-04 | End: 2022-10-13 | Stop reason: SDUPTHER

## 2019-09-04 RX ORDER — OXYCODONE HYDROCHLORIDE AND ACETAMINOPHEN 5; 325 MG/1; MG/1
2 TABLET ORAL ONCE
Status: COMPLETED | OUTPATIENT
Start: 2019-09-04 | End: 2019-09-04

## 2019-09-04 RX ORDER — CEFUROXIME AXETIL 250 MG/1
250 TABLET ORAL 2 TIMES DAILY
Qty: 14 TABLET | Refills: 0 | Status: SHIPPED | OUTPATIENT
Start: 2019-09-04 | End: 2019-09-11

## 2019-09-04 RX ADMIN — METHYLPREDNISOLONE SODIUM SUCCINATE 125 MG: 125 INJECTION, POWDER, FOR SOLUTION INTRAMUSCULAR; INTRAVENOUS at 20:06

## 2019-09-04 RX ADMIN — OXYCODONE HYDROCHLORIDE AND ACETAMINOPHEN 2 TABLET: 5; 325 TABLET ORAL at 22:40

## 2019-09-04 RX ADMIN — CEFTRIAXONE 1 G: 1 INJECTION, POWDER, FOR SOLUTION INTRAMUSCULAR; INTRAVENOUS at 22:42

## 2019-09-04 RX ADMIN — SODIUM CHLORIDE 1000 ML: 9 INJECTION, SOLUTION INTRAVENOUS at 20:13

## 2019-09-04 RX ADMIN — IPRATROPIUM BROMIDE AND ALBUTEROL SULFATE 1 AMPULE: .5; 3 SOLUTION RESPIRATORY (INHALATION) at 20:10

## 2019-09-04 RX ADMIN — IOPAMIDOL 75 ML: 755 INJECTION, SOLUTION INTRAVENOUS at 21:30

## 2019-09-04 ASSESSMENT — ENCOUNTER SYMPTOMS
BACK PAIN: 0
SORE THROAT: 0
ABDOMINAL PAIN: 0
NAUSEA: 0
EYE DISCHARGE: 0
WHEEZING: 1
SHORTNESS OF BREATH: 1
COUGH: 1
EYE PAIN: 0
RHINORRHEA: 0
VOMITING: 0
DIARRHEA: 0

## 2019-09-04 ASSESSMENT — PAIN SCALES - GENERAL
PAINLEVEL_OUTOF10: 10
PAINLEVEL_OUTOF10: 8

## 2019-09-04 ASSESSMENT — PAIN DESCRIPTION - PAIN TYPE: TYPE: ACUTE PAIN

## 2019-09-04 ASSESSMENT — PAIN DESCRIPTION - LOCATION: LOCATION: FLANK

## 2019-09-04 ASSESSMENT — PAIN DESCRIPTION - ORIENTATION: ORIENTATION: LEFT

## 2019-09-04 ASSESSMENT — PAIN DESCRIPTION - DESCRIPTORS: DESCRIPTORS: ACHING

## 2019-09-05 VITALS
RESPIRATION RATE: 16 BRPM | SYSTOLIC BLOOD PRESSURE: 158 MMHG | HEART RATE: 86 BPM | TEMPERATURE: 98.8 F | OXYGEN SATURATION: 95 % | DIASTOLIC BLOOD PRESSURE: 60 MMHG

## 2019-09-05 LAB
EKG ATRIAL RATE: 95 BPM
EKG DIAGNOSIS: NORMAL
EKG P AXIS: 42 DEGREES
EKG P-R INTERVAL: 142 MS
EKG Q-T INTERVAL: 348 MS
EKG QRS DURATION: 80 MS
EKG QTC CALCULATION (BAZETT): 437 MS
EKG R AXIS: 57 DEGREES
EKG T AXIS: 45 DEGREES
EKG VENTRICULAR RATE: 95 BPM

## 2019-09-05 PROCEDURE — 93010 ELECTROCARDIOGRAM REPORT: CPT | Performed by: INTERNAL MEDICINE

## 2019-09-05 ASSESSMENT — PAIN SCALES - GENERAL: PAINLEVEL_OUTOF10: 4

## 2019-09-05 ASSESSMENT — PAIN - FUNCTIONAL ASSESSMENT: PAIN_FUNCTIONAL_ASSESSMENT: 0-10

## 2019-09-12 ENCOUNTER — OFFICE VISIT (OUTPATIENT)
Dept: PULMONOLOGY | Age: 74
End: 2019-09-12
Payer: MEDICARE

## 2019-09-12 VITALS
HEART RATE: 79 BPM | DIASTOLIC BLOOD PRESSURE: 62 MMHG | BODY MASS INDEX: 25.78 KG/M2 | HEIGHT: 64 IN | OXYGEN SATURATION: 93 % | SYSTOLIC BLOOD PRESSURE: 114 MMHG | WEIGHT: 151 LBS | RESPIRATION RATE: 20 BRPM

## 2019-09-12 DIAGNOSIS — J44.9 COPD, SEVERE (HCC): ICD-10-CM

## 2019-09-12 DIAGNOSIS — Z72.0 TOBACCO ABUSE: ICD-10-CM

## 2019-09-12 PROBLEM — A41.9 SEPSIS (HCC): Status: RESOLVED | Noted: 2017-01-10 | Resolved: 2019-09-12

## 2019-09-12 PROCEDURE — 4040F PNEUMOC VAC/ADMIN/RCVD: CPT | Performed by: INTERNAL MEDICINE

## 2019-09-12 PROCEDURE — 4004F PT TOBACCO SCREEN RCVD TLK: CPT | Performed by: INTERNAL MEDICINE

## 2019-09-12 PROCEDURE — G8417 CALC BMI ABV UP PARAM F/U: HCPCS | Performed by: INTERNAL MEDICINE

## 2019-09-12 PROCEDURE — 1123F ACP DISCUSS/DSCN MKR DOCD: CPT | Performed by: INTERNAL MEDICINE

## 2019-09-12 PROCEDURE — 3017F COLORECTAL CA SCREEN DOC REV: CPT | Performed by: INTERNAL MEDICINE

## 2019-09-12 PROCEDURE — G8926 SPIRO NO PERF OR DOC: HCPCS | Performed by: INTERNAL MEDICINE

## 2019-09-12 PROCEDURE — G8427 DOCREV CUR MEDS BY ELIG CLIN: HCPCS | Performed by: INTERNAL MEDICINE

## 2019-09-12 PROCEDURE — 1090F PRES/ABSN URINE INCON ASSESS: CPT | Performed by: INTERNAL MEDICINE

## 2019-09-12 PROCEDURE — 99213 OFFICE O/P EST LOW 20 MIN: CPT | Performed by: INTERNAL MEDICINE

## 2019-09-12 PROCEDURE — 3023F SPIROM DOC REV: CPT | Performed by: INTERNAL MEDICINE

## 2019-09-12 PROCEDURE — G8399 PT W/DXA RESULTS DOCUMENT: HCPCS | Performed by: INTERNAL MEDICINE

## 2019-09-12 RX ORDER — FLUTICASONE FUROATE AND VILANTEROL 200; 25 UG/1; UG/1
POWDER RESPIRATORY (INHALATION)
Qty: 1 EACH | Refills: 5 | Status: SHIPPED | OUTPATIENT
Start: 2019-09-12 | End: 2020-05-22

## 2019-09-12 RX ORDER — PREDNISONE 10 MG/1
TABLET ORAL
Qty: 30 TABLET | Refills: 0 | Status: SHIPPED | OUTPATIENT
Start: 2019-09-12 | End: 2019-09-13 | Stop reason: ALTCHOICE

## 2019-09-13 ENCOUNTER — OFFICE VISIT (OUTPATIENT)
Dept: FAMILY MEDICINE CLINIC | Age: 74
End: 2019-09-13
Payer: MEDICARE

## 2019-09-13 VITALS
DIASTOLIC BLOOD PRESSURE: 70 MMHG | HEART RATE: 77 BPM | WEIGHT: 151 LBS | BODY MASS INDEX: 25.92 KG/M2 | SYSTOLIC BLOOD PRESSURE: 114 MMHG | RESPIRATION RATE: 18 BRPM

## 2019-09-13 DIAGNOSIS — N30.01 ACUTE CYSTITIS WITH HEMATURIA: ICD-10-CM

## 2019-09-13 DIAGNOSIS — Z23 NEED FOR INFLUENZA VACCINATION: ICD-10-CM

## 2019-09-13 DIAGNOSIS — J44.9 COPD, SEVERE (HCC): ICD-10-CM

## 2019-09-13 DIAGNOSIS — S20.212A CONTUSION OF LEFT CHEST WALL, INITIAL ENCOUNTER: ICD-10-CM

## 2019-09-13 LAB
BILIRUBIN, POC: NEGATIVE
BLOOD URINE, POC: ABNORMAL
CLARITY, POC: CLEAR
COLOR, POC: YELLOW
GLUCOSE URINE, POC: NEGATIVE
KETONES, POC: NEGATIVE
LEUKOCYTE EST, POC: ABNORMAL
NITRITE, POC: NEGATIVE
PH, POC: 6.5
PROTEIN, POC: NEGATIVE
SPECIFIC GRAVITY, POC: 1.01
UROBILINOGEN, POC: 0.2

## 2019-09-13 PROCEDURE — 90653 IIV ADJUVANT VACCINE IM: CPT | Performed by: NURSE PRACTITIONER

## 2019-09-13 PROCEDURE — G0008 ADMIN INFLUENZA VIRUS VAC: HCPCS | Performed by: NURSE PRACTITIONER

## 2019-09-13 PROCEDURE — G8417 CALC BMI ABV UP PARAM F/U: HCPCS | Performed by: NURSE PRACTITIONER

## 2019-09-13 PROCEDURE — 1090F PRES/ABSN URINE INCON ASSESS: CPT | Performed by: NURSE PRACTITIONER

## 2019-09-13 PROCEDURE — 4004F PT TOBACCO SCREEN RCVD TLK: CPT | Performed by: NURSE PRACTITIONER

## 2019-09-13 PROCEDURE — 99214 OFFICE O/P EST MOD 30 MIN: CPT | Performed by: NURSE PRACTITIONER

## 2019-09-13 PROCEDURE — G8926 SPIRO NO PERF OR DOC: HCPCS | Performed by: NURSE PRACTITIONER

## 2019-09-13 PROCEDURE — 4040F PNEUMOC VAC/ADMIN/RCVD: CPT | Performed by: NURSE PRACTITIONER

## 2019-09-13 PROCEDURE — 81002 URINALYSIS NONAUTO W/O SCOPE: CPT | Performed by: NURSE PRACTITIONER

## 2019-09-13 PROCEDURE — 3023F SPIROM DOC REV: CPT | Performed by: NURSE PRACTITIONER

## 2019-09-13 PROCEDURE — G8427 DOCREV CUR MEDS BY ELIG CLIN: HCPCS | Performed by: NURSE PRACTITIONER

## 2019-09-13 PROCEDURE — G8399 PT W/DXA RESULTS DOCUMENT: HCPCS | Performed by: NURSE PRACTITIONER

## 2019-09-13 PROCEDURE — 1123F ACP DISCUSS/DSCN MKR DOCD: CPT | Performed by: NURSE PRACTITIONER

## 2019-09-13 PROCEDURE — 3017F COLORECTAL CA SCREEN DOC REV: CPT | Performed by: NURSE PRACTITIONER

## 2019-09-13 ASSESSMENT — ENCOUNTER SYMPTOMS
ABDOMINAL PAIN: 0
DIARRHEA: 0
VOMITING: 0
NAUSEA: 0

## 2019-09-13 NOTE — PROGRESS NOTES
Vaccine Information Sheet, \"Influenza - Inactivated\"  given to Zayra Mansfield, or parent/legal guardian of  Zayra Mansfield and verbalized understanding. Patient responses:    Have you ever had a reaction to a flu vaccine? No  Are you able to eat eggs without adverse effects? Yes  Do you have any current illness? No  Have you ever had [place] Syndrome? No    Flu vaccine given per order. Please see immunization tab.
urinating, dysuria, frequency, hematuria and pelvic pain. Musculoskeletal: Positive for arthralgias and myalgias. Vitals:    09/13/19 1358   BP: 114/70   Site: Right Upper Arm   Position: Sitting   Cuff Size: Medium Adult   Pulse: 77   Resp: 18   Weight: 151 lb (68.5 kg)       Body mass index is 25.92 kg/m². Wt Readings from Last 3 Encounters:   09/13/19 151 lb (68.5 kg)   09/12/19 151 lb (68.5 kg)   08/20/19 152 lb (68.9 kg)       BP Readings from Last 3 Encounters:   09/13/19 114/70   09/12/19 114/62   09/05/19 (!) 158/60       Physical Exam   Constitutional: She is oriented to person, place, and time. She appears well-developed and well-nourished. No distress. HENT:   Head: Normocephalic and atraumatic. Eyes: EOM are normal.   Neck: Neck supple. Cardiovascular: Normal rate, regular rhythm and normal heart sounds. Exam reveals no gallop and no friction rub. No murmur heard. Pulmonary/Chest: Effort normal and breath sounds normal. No respiratory distress. Musculoskeletal:   Left lateral lower ribs tender to palpation. No bruising noted. Neurological: She is alert and oriented to person, place, and time. Skin: Skin is warm and dry. Psychiatric: She has a normal mood and affect. Her behavior is normal. Judgment and thought content normal.   Nursing note and vitals reviewed. [address] [place] [address] was seen today for follow-up from hospital.    Diagnoses and all orders for this visit:    Contusion of left chest wall, initial encounter  Continues with pain, but it is improving. Has pain medication, percocet, from pain specialist that she uses QID PRN. Advised OTC lidocaine patches to chest wall- on 12 hours and off 12 hours PRN  Advised to splint the area with coughing  Discussed that pain should continue to improve for the next 4-6 weeks.      Acute cystitis with hematuria  -     POCT Urinalysis no Micro- trace blood and trace leukocytes   -     URINE CULTURE  Was treated in ER

## 2019-09-16 LAB
ORGANISM: ABNORMAL
URINE CULTURE, ROUTINE: ABNORMAL

## 2019-09-16 RX ORDER — DOXYCYCLINE HYCLATE 100 MG
100 TABLET ORAL 2 TIMES DAILY
Qty: 14 TABLET | Refills: 0 | Status: SHIPPED | OUTPATIENT
Start: 2019-09-16 | End: 2019-09-23

## 2019-09-17 ENCOUNTER — OFFICE VISIT (OUTPATIENT)
Dept: PAIN MANAGEMENT | Age: 74
End: 2019-09-17
Payer: MEDICARE

## 2019-09-17 VITALS
WEIGHT: 152 LBS | DIASTOLIC BLOOD PRESSURE: 72 MMHG | SYSTOLIC BLOOD PRESSURE: 147 MMHG | BODY MASS INDEX: 26.09 KG/M2 | HEART RATE: 70 BPM

## 2019-09-17 DIAGNOSIS — M70.61 TROCHANTERIC BURSITIS OF RIGHT HIP: ICD-10-CM

## 2019-09-17 DIAGNOSIS — M51.36 DDD (DEGENERATIVE DISC DISEASE), LUMBAR: ICD-10-CM

## 2019-09-17 DIAGNOSIS — M51.37 DEGENERATION OF LUMBAR OR LUMBOSACRAL INTERVERTEBRAL DISC: ICD-10-CM

## 2019-09-17 DIAGNOSIS — M47.816 LUMBAR SPONDYLOSIS: ICD-10-CM

## 2019-09-17 DIAGNOSIS — G89.4 CHRONIC PAIN SYNDROME: ICD-10-CM

## 2019-09-17 DIAGNOSIS — M79.7 FIBROMYALGIA: ICD-10-CM

## 2019-09-17 DIAGNOSIS — M47.26 OSTEOARTHRITIS OF SPINE WITH RADICULOPATHY, LUMBAR REGION: ICD-10-CM

## 2019-09-17 PROCEDURE — 99213 OFFICE O/P EST LOW 20 MIN: CPT | Performed by: INTERNAL MEDICINE

## 2019-09-17 PROCEDURE — 4040F PNEUMOC VAC/ADMIN/RCVD: CPT | Performed by: INTERNAL MEDICINE

## 2019-09-17 PROCEDURE — 1123F ACP DISCUSS/DSCN MKR DOCD: CPT | Performed by: INTERNAL MEDICINE

## 2019-09-17 PROCEDURE — G8427 DOCREV CUR MEDS BY ELIG CLIN: HCPCS | Performed by: INTERNAL MEDICINE

## 2019-09-17 PROCEDURE — 3017F COLORECTAL CA SCREEN DOC REV: CPT | Performed by: INTERNAL MEDICINE

## 2019-09-17 PROCEDURE — G8399 PT W/DXA RESULTS DOCUMENT: HCPCS | Performed by: INTERNAL MEDICINE

## 2019-09-17 PROCEDURE — G8417 CALC BMI ABV UP PARAM F/U: HCPCS | Performed by: INTERNAL MEDICINE

## 2019-09-17 PROCEDURE — 1090F PRES/ABSN URINE INCON ASSESS: CPT | Performed by: INTERNAL MEDICINE

## 2019-09-17 PROCEDURE — 4004F PT TOBACCO SCREEN RCVD TLK: CPT | Performed by: INTERNAL MEDICINE

## 2019-09-17 RX ORDER — OXYCODONE AND ACETAMINOPHEN 7.5; 325 MG/1; MG/1
1 TABLET ORAL EVERY 6 HOURS PRN
Qty: 112 TABLET | Refills: 0 | Status: SHIPPED | OUTPATIENT
Start: 2019-09-17 | End: 2019-10-15 | Stop reason: SDUPTHER

## 2019-09-17 RX ORDER — OXYCODONE AND ACETAMINOPHEN 7.5; 325 MG/1; MG/1
1 TABLET ORAL EVERY 6 HOURS PRN
Qty: 112 TABLET | Refills: 0 | Status: SHIPPED | OUTPATIENT
Start: 2019-09-17 | End: 2019-09-17

## 2019-09-17 NOTE — PROGRESS NOTES
PHYSICAL EXAM:   Nursing note and vitals reviewed. BP (!) 147/72   Pulse 70   Wt 152 lb (68.9 kg)   BMI 26.09 kg/m²   Constitutional: She appears well-developed and well-nourished. No acute distress. Skin: Skin is warm and dry, good turgor. No rash noted. She is not diaphoretic. Cardiovascular: Normal rate, regular rhythm, normal heart sounds, and does not have murmur. Pulmonary/Chest: Effort normal. No respiratory distress. She does not have wheezes in the lung fields. She has no rales. Decreased air exchange. Neurological/Psychiatric:She is alert and oriented to person, place, and time. Coordination is  normal.  Her mood isAppropriate and affect is Flat/blunted and Anxious . IMPRESSION:   1. Chronic pain syndrome    2. Degeneration of lumbar or lumbosacral intervertebral disc    3. Trochanteric bursitis of right hip    4. Fibromyalgia    5. Lumbar spondylosis    6. Osteoarthritis of spine with radiculopathy, lumbar region    7. DDD (degenerative disc disease), lumbar        PLAN:  Informed verbal consent was obtained  -continue with current opioid regimen   -Adv Biofeedback, relaxation and meditation techniques. Referral to psychologist for CBT was also discussed with patient  -records from hospital reviewed; she was not admitted and has a history of UTI  -continue with Elavil PRN for insomnia   -She was advised to increase fluids ( 5-7  glasses of fluid daily), limit caffeine, avoid cheese products, increase dietary fiber, increase activity and exercise as tolerated and relax regularly and enjoy meals   -Most recent labs were reviewed and are within normal limits.  Will repeat them within next 9-12 months if there is no status change      Current Outpatient Medications   Medication Sig Dispense Refill    doxycycline hyclate (VIBRA-TABS) 100 MG tablet Take 1 tablet by mouth 2 times daily for 7 days 14 tablet 0    Fluticasone Furoate-Vilanterol (BREO ELLIPTA) 200-25 MCG/INH AEPB INHALE 1

## 2019-09-27 ENCOUNTER — OFFICE VISIT (OUTPATIENT)
Dept: FAMILY MEDICINE CLINIC | Age: 74
End: 2019-09-27
Payer: MEDICARE

## 2019-09-27 VITALS
HEART RATE: 79 BPM | RESPIRATION RATE: 18 BRPM | DIASTOLIC BLOOD PRESSURE: 80 MMHG | SYSTOLIC BLOOD PRESSURE: 122 MMHG | BODY MASS INDEX: 25.75 KG/M2 | WEIGHT: 150 LBS

## 2019-09-27 DIAGNOSIS — N30.01 ACUTE CYSTITIS WITH HEMATURIA: Primary | ICD-10-CM

## 2019-09-27 LAB
BILIRUBIN, POC: ABNORMAL
BLOOD URINE, POC: ABNORMAL
CLARITY, POC: ABNORMAL
COLOR, POC: YELLOW
GLUCOSE URINE, POC: NEGATIVE
KETONES, POC: 15
LEUKOCYTE EST, POC: ABNORMAL
NITRITE, POC: NEGATIVE
PH, POC: 5.5
PROTEIN, POC: 30
SPECIFIC GRAVITY, POC: 1.02
UROBILINOGEN, POC: 4

## 2019-09-27 PROCEDURE — G8427 DOCREV CUR MEDS BY ELIG CLIN: HCPCS | Performed by: NURSE PRACTITIONER

## 2019-09-27 PROCEDURE — 1090F PRES/ABSN URINE INCON ASSESS: CPT | Performed by: NURSE PRACTITIONER

## 2019-09-27 PROCEDURE — G8417 CALC BMI ABV UP PARAM F/U: HCPCS | Performed by: NURSE PRACTITIONER

## 2019-09-27 PROCEDURE — 81002 URINALYSIS NONAUTO W/O SCOPE: CPT | Performed by: NURSE PRACTITIONER

## 2019-09-27 PROCEDURE — G8399 PT W/DXA RESULTS DOCUMENT: HCPCS | Performed by: NURSE PRACTITIONER

## 2019-09-27 PROCEDURE — 99213 OFFICE O/P EST LOW 20 MIN: CPT | Performed by: NURSE PRACTITIONER

## 2019-09-27 PROCEDURE — 3017F COLORECTAL CA SCREEN DOC REV: CPT | Performed by: NURSE PRACTITIONER

## 2019-09-27 PROCEDURE — 4040F PNEUMOC VAC/ADMIN/RCVD: CPT | Performed by: NURSE PRACTITIONER

## 2019-09-27 PROCEDURE — 4004F PT TOBACCO SCREEN RCVD TLK: CPT | Performed by: NURSE PRACTITIONER

## 2019-09-27 PROCEDURE — 1123F ACP DISCUSS/DSCN MKR DOCD: CPT | Performed by: NURSE PRACTITIONER

## 2019-09-27 ASSESSMENT — ENCOUNTER SYMPTOMS: CONSTIPATION: 1

## 2019-09-27 NOTE — PROGRESS NOTES
exacerbation ([address]          Current Outpatient Medications   Medication Sig Dispense Refill    oxyCODONE-acetaminophen (PERCOCET) 7.5-325 MG per tablet Take 1 tablet by mouth every 6 hours as needed for Pain for up to 28 days. Max 4 a day 112 tablet 0    Fluticasone Furoate-Vilanterol (BREO ELLIPTA) 200-25 MCG/INH AEPB INHALE 1 PUFF INTO THE LUNGS DAILY 1 each 5    ipratropium-albuterol (DUONEB) 0.5-2.5 (3) MG/3ML SOLN nebulizer solution Take 1 aerosol every 4 hours for 2 days and then as needed for shortness of breath coughing and wheezing 360 mL 3    pantoprazole (PROTONIX) 40 MG tablet Take 1 tablet by mouth daily 30 tablet 1    DULoxetine (CYMBALTA) 60 MG extended release capsule Take one tablet at night 30 capsule 1    tiZANidine (ZANAFLEX) 4 MG tablet Take one tablet po BID 60 tablet 1    amitriptyline (ELAVIL) 25 MG tablet TAKE 1 TABLET BY MOUTH NIGHTLY 30 tablet 1    meloxicam (MOBIC) 15 MG tablet TAKE 1 TABLET BY MOUTH DAILY 30 tablet 1    Cholecalciferol (VITAMIN D) 2000 units TABS tablet Take 1 tablet by mouth daily 30 tablet     atorvastatin (LIPITOR) 80 MG tablet TAKE 1 TABLET BY MOUTH EVERY DAY FOR CHOLESTEROL 90 tablet 1    cetirizine (ZYRTEC) 10 MG tablet Take 10 mg by mouth daily as needed for Allergies      Nebulizers (COMPRESSOR/NEBULIZER) MISC 1 Units by Does not apply route 4 times daily 1 each 3    albuterol sulfate  (90 Base) MCG/ACT inhaler Inhale 2 puffs into the lungs every 6 hours as needed for Shortness of Breath 1 Inhaler 5    gabapentin (NEURONTIN) 600 MG tablet Take 1 tablet by mouth 3 times daily for 30 days. 90 tablet 1     No current facility-administered medications for this visit. No Known Allergies    Review of Systems   Constitutional: Negative for activity change and fever. Respiratory:        Denies increased cough or shortness of breath from her baseline   Gastrointestinal: Positive for constipation.         At times will have constipation r/t 1/27/2020), or if symptoms worsen or fail to improve, for chronic conditions.

## 2019-09-30 LAB
ORGANISM: ABNORMAL
URINE CULTURE, ROUTINE: ABNORMAL

## 2019-09-30 RX ORDER — CIPROFLOXACIN 500 MG/1
500 TABLET, FILM COATED ORAL 2 TIMES DAILY
Qty: 10 TABLET | Refills: 0 | Status: SHIPPED | OUTPATIENT
Start: 2019-09-30 | End: 2019-10-05

## 2019-10-09 ENCOUNTER — TELEPHONE (OUTPATIENT)
Dept: PAIN MANAGEMENT | Age: 74
End: 2019-10-09

## 2019-10-09 DIAGNOSIS — M51.37 DEGENERATION OF LUMBAR OR LUMBOSACRAL INTERVERTEBRAL DISC: ICD-10-CM

## 2019-10-09 DIAGNOSIS — M70.61 TROCHANTERIC BURSITIS OF RIGHT HIP: ICD-10-CM

## 2019-10-09 DIAGNOSIS — R25.2 MUSCLE CRAMPING: ICD-10-CM

## 2019-10-09 DIAGNOSIS — M79.7 FIBROMYALGIA: ICD-10-CM

## 2019-10-09 DIAGNOSIS — G89.4 CHRONIC PAIN SYNDROME: ICD-10-CM

## 2019-10-09 DIAGNOSIS — M47.816 LUMBAR SPONDYLOSIS: ICD-10-CM

## 2019-10-09 DIAGNOSIS — M51.36 DDD (DEGENERATIVE DISC DISEASE), LUMBAR: ICD-10-CM

## 2019-10-09 DIAGNOSIS — M47.26 OSTEOARTHRITIS OF SPINE WITH RADICULOPATHY, LUMBAR REGION: ICD-10-CM

## 2019-10-15 ENCOUNTER — OFFICE VISIT (OUTPATIENT)
Dept: PAIN MANAGEMENT | Age: 74
End: 2019-10-15
Payer: MEDICARE

## 2019-10-15 VITALS
BODY MASS INDEX: 25.58 KG/M2 | SYSTOLIC BLOOD PRESSURE: 114 MMHG | WEIGHT: 149 LBS | DIASTOLIC BLOOD PRESSURE: 68 MMHG | HEART RATE: 76 BPM

## 2019-10-15 DIAGNOSIS — M70.61 TROCHANTERIC BURSITIS OF RIGHT HIP: ICD-10-CM

## 2019-10-15 DIAGNOSIS — M79.7 FIBROMYALGIA: ICD-10-CM

## 2019-10-15 DIAGNOSIS — G89.4 CHRONIC PAIN SYNDROME: ICD-10-CM

## 2019-10-15 DIAGNOSIS — M47.26 OSTEOARTHRITIS OF SPINE WITH RADICULOPATHY, LUMBAR REGION: ICD-10-CM

## 2019-10-15 DIAGNOSIS — R25.2 MUSCLE CRAMPING: ICD-10-CM

## 2019-10-15 DIAGNOSIS — M51.36 DDD (DEGENERATIVE DISC DISEASE), LUMBAR: ICD-10-CM

## 2019-10-15 DIAGNOSIS — M51.37 DEGENERATION OF LUMBAR OR LUMBOSACRAL INTERVERTEBRAL DISC: ICD-10-CM

## 2019-10-15 DIAGNOSIS — M47.816 LUMBAR SPONDYLOSIS: ICD-10-CM

## 2019-10-15 PROCEDURE — G8417 CALC BMI ABV UP PARAM F/U: HCPCS | Performed by: INTERNAL MEDICINE

## 2019-10-15 PROCEDURE — G8427 DOCREV CUR MEDS BY ELIG CLIN: HCPCS | Performed by: INTERNAL MEDICINE

## 2019-10-15 PROCEDURE — G8482 FLU IMMUNIZE ORDER/ADMIN: HCPCS | Performed by: INTERNAL MEDICINE

## 2019-10-15 PROCEDURE — G8399 PT W/DXA RESULTS DOCUMENT: HCPCS | Performed by: INTERNAL MEDICINE

## 2019-10-15 PROCEDURE — 1123F ACP DISCUSS/DSCN MKR DOCD: CPT | Performed by: INTERNAL MEDICINE

## 2019-10-15 PROCEDURE — 99213 OFFICE O/P EST LOW 20 MIN: CPT | Performed by: INTERNAL MEDICINE

## 2019-10-15 PROCEDURE — 3017F COLORECTAL CA SCREEN DOC REV: CPT | Performed by: INTERNAL MEDICINE

## 2019-10-15 PROCEDURE — 4004F PT TOBACCO SCREEN RCVD TLK: CPT | Performed by: INTERNAL MEDICINE

## 2019-10-15 PROCEDURE — 4040F PNEUMOC VAC/ADMIN/RCVD: CPT | Performed by: INTERNAL MEDICINE

## 2019-10-15 PROCEDURE — 1090F PRES/ABSN URINE INCON ASSESS: CPT | Performed by: INTERNAL MEDICINE

## 2019-10-15 RX ORDER — OXYCODONE AND ACETAMINOPHEN 7.5; 325 MG/1; MG/1
1 TABLET ORAL EVERY 6 HOURS PRN
Qty: 120 TABLET | Refills: 0 | Status: SHIPPED | OUTPATIENT
Start: 2019-10-15 | End: 2019-11-13 | Stop reason: SDUPTHER

## 2019-10-15 RX ORDER — DULOXETIN HYDROCHLORIDE 60 MG/1
CAPSULE, DELAYED RELEASE ORAL
Qty: 30 CAPSULE | Refills: 1 | Status: SHIPPED | OUTPATIENT
Start: 2019-10-15 | End: 2019-12-10 | Stop reason: SDUPTHER

## 2019-10-15 RX ORDER — MELOXICAM 15 MG/1
TABLET ORAL
Qty: 30 TABLET | Refills: 1 | Status: SHIPPED | OUTPATIENT
Start: 2019-10-15 | End: 2019-11-13 | Stop reason: SDUPTHER

## 2019-10-15 RX ORDER — PANTOPRAZOLE SODIUM 40 MG/1
40 TABLET, DELAYED RELEASE ORAL DAILY
Qty: 30 TABLET | Refills: 1 | Status: SHIPPED | OUTPATIENT
Start: 2019-10-15 | End: 2019-11-13 | Stop reason: SDUPTHER

## 2019-10-15 RX ORDER — AMITRIPTYLINE HYDROCHLORIDE 25 MG/1
TABLET, FILM COATED ORAL
Qty: 30 TABLET | Refills: 1 | Status: SHIPPED | OUTPATIENT
Start: 2019-10-15 | End: 2019-11-13

## 2019-10-15 RX ORDER — TIZANIDINE 4 MG/1
TABLET ORAL
Qty: 60 TABLET | Refills: 1 | Status: SHIPPED | OUTPATIENT
Start: 2019-10-15 | End: 2019-12-10 | Stop reason: SDUPTHER

## 2019-10-17 ENCOUNTER — OFFICE VISIT (OUTPATIENT)
Dept: FAMILY MEDICINE CLINIC | Age: 74
End: 2019-10-17
Payer: MEDICARE

## 2019-10-17 VITALS
SYSTOLIC BLOOD PRESSURE: 104 MMHG | HEART RATE: 78 BPM | RESPIRATION RATE: 14 BRPM | OXYGEN SATURATION: 97 % | DIASTOLIC BLOOD PRESSURE: 66 MMHG | BODY MASS INDEX: 25.92 KG/M2 | WEIGHT: 151 LBS

## 2019-10-17 DIAGNOSIS — N30.01 ACUTE CYSTITIS WITH HEMATURIA: Primary | ICD-10-CM

## 2019-10-17 DIAGNOSIS — E78.00 HYPERCHOLESTEREMIA: ICD-10-CM

## 2019-10-17 LAB
BILIRUBIN, POC: ABNORMAL
BLOOD URINE, POC: ABNORMAL
CLARITY, POC: CLEAR
COLOR, POC: YELLOW
GLUCOSE URINE, POC: ABNORMAL
KETONES, POC: ABNORMAL
LEUKOCYTE EST, POC: ABNORMAL
NITRITE, POC: ABNORMAL
PH, POC: 5.5
PROTEIN, POC: ABNORMAL
SPECIFIC GRAVITY, POC: 1.01
UROBILINOGEN, POC: 0.2

## 2019-10-17 PROCEDURE — 3017F COLORECTAL CA SCREEN DOC REV: CPT | Performed by: NURSE PRACTITIONER

## 2019-10-17 PROCEDURE — G8399 PT W/DXA RESULTS DOCUMENT: HCPCS | Performed by: NURSE PRACTITIONER

## 2019-10-17 PROCEDURE — 1123F ACP DISCUSS/DSCN MKR DOCD: CPT | Performed by: NURSE PRACTITIONER

## 2019-10-17 PROCEDURE — 4004F PT TOBACCO SCREEN RCVD TLK: CPT | Performed by: NURSE PRACTITIONER

## 2019-10-17 PROCEDURE — 1090F PRES/ABSN URINE INCON ASSESS: CPT | Performed by: NURSE PRACTITIONER

## 2019-10-17 PROCEDURE — 99213 OFFICE O/P EST LOW 20 MIN: CPT | Performed by: NURSE PRACTITIONER

## 2019-10-17 PROCEDURE — G8427 DOCREV CUR MEDS BY ELIG CLIN: HCPCS | Performed by: NURSE PRACTITIONER

## 2019-10-17 PROCEDURE — 81002 URINALYSIS NONAUTO W/O SCOPE: CPT | Performed by: NURSE PRACTITIONER

## 2019-10-17 PROCEDURE — G8417 CALC BMI ABV UP PARAM F/U: HCPCS | Performed by: NURSE PRACTITIONER

## 2019-10-17 PROCEDURE — G8482 FLU IMMUNIZE ORDER/ADMIN: HCPCS | Performed by: NURSE PRACTITIONER

## 2019-10-17 PROCEDURE — 4040F PNEUMOC VAC/ADMIN/RCVD: CPT | Performed by: NURSE PRACTITIONER

## 2019-10-17 RX ORDER — ATORVASTATIN CALCIUM 80 MG/1
TABLET, FILM COATED ORAL
Qty: 90 TABLET | Refills: 1 | Status: SHIPPED | OUTPATIENT
Start: 2019-10-17 | End: 2020-04-07

## 2019-10-17 ASSESSMENT — ENCOUNTER SYMPTOMS
DIARRHEA: 0
VOMITING: 0
NAUSEA: 0
ABDOMINAL PAIN: 0

## 2019-10-20 LAB — URINE CULTURE, ROUTINE: NORMAL

## 2019-10-21 DIAGNOSIS — R31.29 MICROSCOPIC HEMATURIA: Primary | ICD-10-CM

## 2019-10-25 DIAGNOSIS — R31.29 MICROSCOPIC HEMATURIA: ICD-10-CM

## 2019-10-25 LAB
BACTERIA: ABNORMAL /HPF
BILIRUBIN URINE: ABNORMAL
BLOOD, URINE: NEGATIVE
CLARITY: CLEAR
COLOR: YELLOW
COMMENT UA: ABNORMAL
EPITHELIAL CELLS, UA: 4 /HPF (ref 0–5)
GLUCOSE URINE: NEGATIVE MG/DL
HYALINE CASTS: 9 /LPF (ref 0–8)
KETONES, URINE: NEGATIVE MG/DL
LEUKOCYTE ESTERASE, URINE: ABNORMAL
MICROSCOPIC EXAMINATION: YES
NITRITE, URINE: NEGATIVE
PH UA: 6 (ref 5–8)
PROTEIN UA: NEGATIVE MG/DL
RBC UA: 4 /HPF (ref 0–4)
SPECIFIC GRAVITY UA: 1.02 (ref 1–1.03)
URINE TYPE: ABNORMAL
UROBILINOGEN, URINE: 0.2 E.U./DL
WBC UA: 42 /HPF (ref 0–5)

## 2019-10-28 ENCOUNTER — TELEPHONE (OUTPATIENT)
Dept: FAMILY MEDICINE CLINIC | Age: 74
End: 2019-10-28

## 2019-11-13 ENCOUNTER — OFFICE VISIT (OUTPATIENT)
Dept: PAIN MANAGEMENT | Age: 74
End: 2019-11-13
Payer: MEDICARE

## 2019-11-13 VITALS
SYSTOLIC BLOOD PRESSURE: 123 MMHG | BODY MASS INDEX: 25.75 KG/M2 | WEIGHT: 150 LBS | HEART RATE: 82 BPM | DIASTOLIC BLOOD PRESSURE: 69 MMHG

## 2019-11-13 DIAGNOSIS — F33.1 MODERATE EPISODE OF RECURRENT MAJOR DEPRESSIVE DISORDER (HCC): ICD-10-CM

## 2019-11-13 DIAGNOSIS — K21.9 GASTROESOPHAGEAL REFLUX DISEASE WITHOUT ESOPHAGITIS: ICD-10-CM

## 2019-11-13 DIAGNOSIS — M47.26 OSTEOARTHRITIS OF SPINE WITH RADICULOPATHY, LUMBAR REGION: ICD-10-CM

## 2019-11-13 DIAGNOSIS — M51.37 DEGENERATION OF LUMBAR OR LUMBOSACRAL INTERVERTEBRAL DISC: ICD-10-CM

## 2019-11-13 DIAGNOSIS — M79.7 FIBROMYALGIA: ICD-10-CM

## 2019-11-13 DIAGNOSIS — F51.01 PRIMARY INSOMNIA: ICD-10-CM

## 2019-11-13 DIAGNOSIS — G89.4 CHRONIC PAIN SYNDROME: ICD-10-CM

## 2019-11-13 DIAGNOSIS — K59.03 DRUG-INDUCED CONSTIPATION: ICD-10-CM

## 2019-11-13 DIAGNOSIS — M47.816 LUMBAR SPONDYLOSIS: ICD-10-CM

## 2019-11-13 DIAGNOSIS — M51.36 DDD (DEGENERATIVE DISC DISEASE), LUMBAR: ICD-10-CM

## 2019-11-13 DIAGNOSIS — M70.61 TROCHANTERIC BURSITIS OF RIGHT HIP: ICD-10-CM

## 2019-11-13 PROCEDURE — 3017F COLORECTAL CA SCREEN DOC REV: CPT | Performed by: INTERNAL MEDICINE

## 2019-11-13 PROCEDURE — 4040F PNEUMOC VAC/ADMIN/RCVD: CPT | Performed by: INTERNAL MEDICINE

## 2019-11-13 PROCEDURE — 1090F PRES/ABSN URINE INCON ASSESS: CPT | Performed by: INTERNAL MEDICINE

## 2019-11-13 PROCEDURE — G8399 PT W/DXA RESULTS DOCUMENT: HCPCS | Performed by: INTERNAL MEDICINE

## 2019-11-13 PROCEDURE — G8482 FLU IMMUNIZE ORDER/ADMIN: HCPCS | Performed by: INTERNAL MEDICINE

## 2019-11-13 PROCEDURE — 1123F ACP DISCUSS/DSCN MKR DOCD: CPT | Performed by: INTERNAL MEDICINE

## 2019-11-13 PROCEDURE — G8417 CALC BMI ABV UP PARAM F/U: HCPCS | Performed by: INTERNAL MEDICINE

## 2019-11-13 PROCEDURE — 99214 OFFICE O/P EST MOD 30 MIN: CPT | Performed by: INTERNAL MEDICINE

## 2019-11-13 PROCEDURE — G8427 DOCREV CUR MEDS BY ELIG CLIN: HCPCS | Performed by: INTERNAL MEDICINE

## 2019-11-13 PROCEDURE — 4004F PT TOBACCO SCREEN RCVD TLK: CPT | Performed by: INTERNAL MEDICINE

## 2019-11-13 RX ORDER — MELOXICAM 15 MG/1
TABLET ORAL
Qty: 30 TABLET | Refills: 1 | Status: SHIPPED | OUTPATIENT
Start: 2019-11-13 | End: 2020-01-14 | Stop reason: SDUPTHER

## 2019-11-13 RX ORDER — GABAPENTIN 600 MG/1
600 TABLET ORAL 3 TIMES DAILY
Qty: 90 TABLET | Refills: 1 | Status: SHIPPED | OUTPATIENT
Start: 2019-11-13 | End: 2019-12-10 | Stop reason: SDUPTHER

## 2019-11-13 RX ORDER — QUETIAPINE FUMARATE 25 MG/1
25-50 TABLET, FILM COATED ORAL NIGHTLY
Qty: 60 TABLET | Refills: 0 | Status: SHIPPED | OUTPATIENT
Start: 2019-11-13 | End: 2019-12-10 | Stop reason: SDUPTHER

## 2019-11-13 RX ORDER — PANTOPRAZOLE SODIUM 40 MG/1
40 TABLET, DELAYED RELEASE ORAL DAILY
Qty: 30 TABLET | Refills: 1 | Status: SHIPPED | OUTPATIENT
Start: 2019-11-13 | End: 2019-12-10 | Stop reason: SDUPTHER

## 2019-11-13 RX ORDER — OXYCODONE AND ACETAMINOPHEN 7.5; 325 MG/1; MG/1
1 TABLET ORAL EVERY 6 HOURS PRN
Qty: 120 TABLET | Refills: 0 | Status: SHIPPED | OUTPATIENT
Start: 2019-11-13 | End: 2019-12-10 | Stop reason: SDUPTHER

## 2019-12-10 ENCOUNTER — OFFICE VISIT (OUTPATIENT)
Dept: PAIN MANAGEMENT | Age: 74
End: 2019-12-10
Payer: MEDICARE

## 2019-12-10 VITALS
BODY MASS INDEX: 25.75 KG/M2 | WEIGHT: 150 LBS | SYSTOLIC BLOOD PRESSURE: 116 MMHG | HEART RATE: 73 BPM | DIASTOLIC BLOOD PRESSURE: 66 MMHG

## 2019-12-10 DIAGNOSIS — G89.4 CHRONIC PAIN SYNDROME: ICD-10-CM

## 2019-12-10 DIAGNOSIS — M47.26 OSTEOARTHRITIS OF SPINE WITH RADICULOPATHY, LUMBAR REGION: ICD-10-CM

## 2019-12-10 DIAGNOSIS — M51.36 DDD (DEGENERATIVE DISC DISEASE), LUMBAR: ICD-10-CM

## 2019-12-10 DIAGNOSIS — M47.816 LUMBAR SPONDYLOSIS: ICD-10-CM

## 2019-12-10 DIAGNOSIS — M51.37 DEGENERATION OF LUMBAR OR LUMBOSACRAL INTERVERTEBRAL DISC: ICD-10-CM

## 2019-12-10 DIAGNOSIS — M79.7 FIBROMYALGIA: ICD-10-CM

## 2019-12-10 DIAGNOSIS — M70.61 TROCHANTERIC BURSITIS OF RIGHT HIP: ICD-10-CM

## 2019-12-10 DIAGNOSIS — F51.01 PRIMARY INSOMNIA: ICD-10-CM

## 2019-12-10 PROCEDURE — 3017F COLORECTAL CA SCREEN DOC REV: CPT | Performed by: INTERNAL MEDICINE

## 2019-12-10 PROCEDURE — 4004F PT TOBACCO SCREEN RCVD TLK: CPT | Performed by: INTERNAL MEDICINE

## 2019-12-10 PROCEDURE — G8482 FLU IMMUNIZE ORDER/ADMIN: HCPCS | Performed by: INTERNAL MEDICINE

## 2019-12-10 PROCEDURE — 99213 OFFICE O/P EST LOW 20 MIN: CPT | Performed by: INTERNAL MEDICINE

## 2019-12-10 PROCEDURE — G8427 DOCREV CUR MEDS BY ELIG CLIN: HCPCS | Performed by: INTERNAL MEDICINE

## 2019-12-10 PROCEDURE — 4040F PNEUMOC VAC/ADMIN/RCVD: CPT | Performed by: INTERNAL MEDICINE

## 2019-12-10 PROCEDURE — 1090F PRES/ABSN URINE INCON ASSESS: CPT | Performed by: INTERNAL MEDICINE

## 2019-12-10 PROCEDURE — 1123F ACP DISCUSS/DSCN MKR DOCD: CPT | Performed by: INTERNAL MEDICINE

## 2019-12-10 PROCEDURE — G8417 CALC BMI ABV UP PARAM F/U: HCPCS | Performed by: INTERNAL MEDICINE

## 2019-12-10 PROCEDURE — G8399 PT W/DXA RESULTS DOCUMENT: HCPCS | Performed by: INTERNAL MEDICINE

## 2019-12-10 RX ORDER — DULOXETIN HYDROCHLORIDE 60 MG/1
CAPSULE, DELAYED RELEASE ORAL
Qty: 30 CAPSULE | Refills: 1 | Status: SHIPPED | OUTPATIENT
Start: 2019-12-10 | End: 2020-01-14 | Stop reason: SDUPTHER

## 2019-12-10 RX ORDER — PANTOPRAZOLE SODIUM 40 MG/1
40 TABLET, DELAYED RELEASE ORAL DAILY
Qty: 30 TABLET | Refills: 1 | Status: SHIPPED | OUTPATIENT
Start: 2019-12-10 | End: 2020-01-14 | Stop reason: SDUPTHER

## 2019-12-10 RX ORDER — OXYCODONE AND ACETAMINOPHEN 7.5; 325 MG/1; MG/1
1 TABLET ORAL EVERY 6 HOURS PRN
Qty: 120 TABLET | Refills: 0 | Status: SHIPPED | OUTPATIENT
Start: 2019-12-10 | End: 2020-01-14 | Stop reason: SDUPTHER

## 2019-12-10 RX ORDER — TIZANIDINE 4 MG/1
TABLET ORAL
Qty: 60 TABLET | Refills: 1 | Status: SHIPPED | OUTPATIENT
Start: 2019-12-10 | End: 2020-01-14 | Stop reason: SDUPTHER

## 2019-12-10 RX ORDER — GABAPENTIN 600 MG/1
600 TABLET ORAL 3 TIMES DAILY
Qty: 90 TABLET | Refills: 1 | Status: SHIPPED | OUTPATIENT
Start: 2019-12-10 | End: 2020-01-14 | Stop reason: SDUPTHER

## 2019-12-10 RX ORDER — QUETIAPINE FUMARATE 25 MG/1
25-50 TABLET, FILM COATED ORAL NIGHTLY
Qty: 60 TABLET | Refills: 0 | Status: SHIPPED | OUTPATIENT
Start: 2019-12-10 | End: 2020-01-14 | Stop reason: SDUPTHER

## 2020-01-03 ENCOUNTER — APPOINTMENT (OUTPATIENT)
Dept: GENERAL RADIOLOGY | Age: 75
DRG: 189 | End: 2020-01-03
Payer: MEDICARE

## 2020-01-03 ENCOUNTER — HOSPITAL ENCOUNTER (INPATIENT)
Age: 75
LOS: 2 days | Discharge: HOME | DRG: 189 | End: 2020-01-05
Attending: EMERGENCY MEDICINE | Admitting: INTERNAL MEDICINE
Payer: MEDICARE

## 2020-01-03 ENCOUNTER — OFFICE VISIT (OUTPATIENT)
Dept: FAMILY MEDICINE CLINIC | Age: 75
End: 2020-01-03
Payer: MEDICARE

## 2020-01-03 VITALS
HEART RATE: 66 BPM | HEIGHT: 64 IN | WEIGHT: 149 LBS | SYSTOLIC BLOOD PRESSURE: 114 MMHG | BODY MASS INDEX: 25.44 KG/M2 | DIASTOLIC BLOOD PRESSURE: 72 MMHG | OXYGEN SATURATION: 89 % | TEMPERATURE: 97.9 F | RESPIRATION RATE: 16 BRPM

## 2020-01-03 LAB
ANION GAP SERPL CALCULATED.3IONS-SCNC: 12 MMOL/L (ref 3–16)
BASOPHILS ABSOLUTE: 0 K/UL (ref 0–0.2)
BASOPHILS RELATIVE PERCENT: 0.4 %
BUN BLDV-MCNC: 14 MG/DL (ref 7–20)
CALCIUM SERPL-MCNC: 8.4 MG/DL (ref 8.3–10.6)
CHLORIDE BLD-SCNC: 99 MMOL/L (ref 99–110)
CO2: 27 MMOL/L (ref 21–32)
CREAT SERPL-MCNC: 0.9 MG/DL (ref 0.6–1.2)
EKG ATRIAL RATE: 63 BPM
EKG DIAGNOSIS: NORMAL
EKG P AXIS: 57 DEGREES
EKG P-R INTERVAL: 146 MS
EKG Q-T INTERVAL: 418 MS
EKG QRS DURATION: 78 MS
EKG QTC CALCULATION (BAZETT): 427 MS
EKG R AXIS: 50 DEGREES
EKG T AXIS: 54 DEGREES
EKG VENTRICULAR RATE: 63 BPM
EOSINOPHILS ABSOLUTE: 0.2 K/UL (ref 0–0.6)
EOSINOPHILS RELATIVE PERCENT: 2.2 %
GFR AFRICAN AMERICAN: >60
GFR NON-AFRICAN AMERICAN: >60
GLUCOSE BLD-MCNC: 113 MG/DL (ref 70–99)
HCT VFR BLD CALC: 37.8 % (ref 36–48)
HEMOGLOBIN: 12.7 G/DL (ref 12–16)
LACTIC ACID, SEPSIS: 0.8 MMOL/L (ref 0.4–1.9)
LYMPHOCYTES ABSOLUTE: 2 K/UL (ref 1–5.1)
LYMPHOCYTES RELATIVE PERCENT: 19 %
MCH RBC QN AUTO: 32.1 PG (ref 26–34)
MCHC RBC AUTO-ENTMCNC: 33.6 G/DL (ref 31–36)
MCV RBC AUTO: 95.4 FL (ref 80–100)
MONOCYTES ABSOLUTE: 0.9 K/UL (ref 0–1.3)
MONOCYTES RELATIVE PERCENT: 8.3 %
NEUTROPHILS ABSOLUTE: 7.3 K/UL (ref 1.7–7.7)
NEUTROPHILS RELATIVE PERCENT: 70.1 %
PDW BLD-RTO: 14.2 % (ref 12.4–15.4)
PLATELET # BLD: 288 K/UL (ref 135–450)
PMV BLD AUTO: 6.8 FL (ref 5–10.5)
POTASSIUM REFLEX MAGNESIUM: 4.2 MMOL/L (ref 3.5–5.1)
PRO-BNP: 79 PG/ML (ref 0–124)
RAPID INFLUENZA  B AGN: NEGATIVE
RAPID INFLUENZA A AGN: NEGATIVE
RBC # BLD: 3.96 M/UL (ref 4–5.2)
SODIUM BLD-SCNC: 138 MMOL/L (ref 136–145)
TROPONIN: <0.01 NG/ML
WBC # BLD: 10.5 K/UL (ref 4–11)

## 2020-01-03 PROCEDURE — 85025 COMPLETE CBC W/AUTO DIFF WBC: CPT

## 2020-01-03 PROCEDURE — 93010 ELECTROCARDIOGRAM REPORT: CPT | Performed by: INTERNAL MEDICINE

## 2020-01-03 PROCEDURE — 71046 X-RAY EXAM CHEST 2 VIEWS: CPT

## 2020-01-03 PROCEDURE — 36415 COLL VENOUS BLD VENIPUNCTURE: CPT

## 2020-01-03 PROCEDURE — 6360000002 HC RX W HCPCS: Performed by: INTERNAL MEDICINE

## 2020-01-03 PROCEDURE — 96365 THER/PROPH/DIAG IV INF INIT: CPT

## 2020-01-03 PROCEDURE — G8926 SPIRO NO PERF OR DOC: HCPCS | Performed by: NURSE PRACTITIONER

## 2020-01-03 PROCEDURE — 94640 AIRWAY INHALATION TREATMENT: CPT

## 2020-01-03 PROCEDURE — 96367 TX/PROPH/DG ADDL SEQ IV INF: CPT

## 2020-01-03 PROCEDURE — 1090F PRES/ABSN URINE INCON ASSESS: CPT | Performed by: NURSE PRACTITIONER

## 2020-01-03 PROCEDURE — 87070 CULTURE OTHR SPECIMN AEROBIC: CPT

## 2020-01-03 PROCEDURE — 94761 N-INVAS EAR/PLS OXIMETRY MLT: CPT

## 2020-01-03 PROCEDURE — 99213 OFFICE O/P EST LOW 20 MIN: CPT | Performed by: NURSE PRACTITIONER

## 2020-01-03 PROCEDURE — 84484 ASSAY OF TROPONIN QUANT: CPT

## 2020-01-03 PROCEDURE — 3017F COLORECTAL CA SCREEN DOC REV: CPT | Performed by: NURSE PRACTITIONER

## 2020-01-03 PROCEDURE — 6370000000 HC RX 637 (ALT 250 FOR IP): Performed by: INTERNAL MEDICINE

## 2020-01-03 PROCEDURE — 99285 EMERGENCY DEPT VISIT HI MDM: CPT

## 2020-01-03 PROCEDURE — 6370000000 HC RX 637 (ALT 250 FOR IP): Performed by: EMERGENCY MEDICINE

## 2020-01-03 PROCEDURE — 87040 BLOOD CULTURE FOR BACTERIA: CPT

## 2020-01-03 PROCEDURE — 4040F PNEUMOC VAC/ADMIN/RCVD: CPT | Performed by: NURSE PRACTITIONER

## 2020-01-03 PROCEDURE — 83605 ASSAY OF LACTIC ACID: CPT

## 2020-01-03 PROCEDURE — 96375 TX/PRO/DX INJ NEW DRUG ADDON: CPT

## 2020-01-03 PROCEDURE — 1123F ACP DISCUSS/DSCN MKR DOCD: CPT | Performed by: NURSE PRACTITIONER

## 2020-01-03 PROCEDURE — 87804 INFLUENZA ASSAY W/OPTIC: CPT

## 2020-01-03 PROCEDURE — 87205 SMEAR GRAM STAIN: CPT

## 2020-01-03 PROCEDURE — G8417 CALC BMI ABV UP PARAM F/U: HCPCS | Performed by: NURSE PRACTITIONER

## 2020-01-03 PROCEDURE — 80048 BASIC METABOLIC PNL TOTAL CA: CPT

## 2020-01-03 PROCEDURE — 4004F PT TOBACCO SCREEN RCVD TLK: CPT | Performed by: NURSE PRACTITIONER

## 2020-01-03 PROCEDURE — 3023F SPIROM DOC REV: CPT | Performed by: NURSE PRACTITIONER

## 2020-01-03 PROCEDURE — 2700000000 HC OXYGEN THERAPY PER DAY

## 2020-01-03 PROCEDURE — G8399 PT W/DXA RESULTS DOCUMENT: HCPCS | Performed by: NURSE PRACTITIONER

## 2020-01-03 PROCEDURE — 1200000000 HC SEMI PRIVATE

## 2020-01-03 PROCEDURE — 6360000002 HC RX W HCPCS: Performed by: EMERGENCY MEDICINE

## 2020-01-03 PROCEDURE — 83880 ASSAY OF NATRIURETIC PEPTIDE: CPT

## 2020-01-03 PROCEDURE — G8482 FLU IMMUNIZE ORDER/ADMIN: HCPCS | Performed by: NURSE PRACTITIONER

## 2020-01-03 PROCEDURE — 93005 ELECTROCARDIOGRAM TRACING: CPT | Performed by: EMERGENCY MEDICINE

## 2020-01-03 PROCEDURE — 96376 TX/PRO/DX INJ SAME DRUG ADON: CPT

## 2020-01-03 PROCEDURE — G8427 DOCREV CUR MEDS BY ELIG CLIN: HCPCS | Performed by: NURSE PRACTITIONER

## 2020-01-03 PROCEDURE — 2580000003 HC RX 258: Performed by: EMERGENCY MEDICINE

## 2020-01-03 PROCEDURE — 2580000003 HC RX 258: Performed by: INTERNAL MEDICINE

## 2020-01-03 RX ORDER — ATORVASTATIN CALCIUM 10 MG/1
10 TABLET, FILM COATED ORAL DAILY
Status: DISCONTINUED | OUTPATIENT
Start: 2020-01-03 | End: 2020-01-05 | Stop reason: HOSPADM

## 2020-01-03 RX ORDER — TIZANIDINE 4 MG/1
2 TABLET ORAL EVERY 8 HOURS PRN
Status: DISCONTINUED | OUTPATIENT
Start: 2020-01-03 | End: 2020-01-05 | Stop reason: HOSPADM

## 2020-01-03 RX ORDER — IPRATROPIUM BROMIDE AND ALBUTEROL SULFATE 2.5; .5 MG/3ML; MG/3ML
1 SOLUTION RESPIRATORY (INHALATION)
Status: DISCONTINUED | OUTPATIENT
Start: 2020-01-03 | End: 2020-01-05 | Stop reason: HOSPADM

## 2020-01-03 RX ORDER — METHYLPREDNISOLONE SODIUM SUCCINATE 125 MG/2ML
125 INJECTION, POWDER, LYOPHILIZED, FOR SOLUTION INTRAMUSCULAR; INTRAVENOUS ONCE
Status: COMPLETED | OUTPATIENT
Start: 2020-01-03 | End: 2020-01-03

## 2020-01-03 RX ORDER — PANTOPRAZOLE SODIUM 40 MG/1
40 TABLET, DELAYED RELEASE ORAL DAILY
Status: DISCONTINUED | OUTPATIENT
Start: 2020-01-04 | End: 2020-01-05 | Stop reason: HOSPADM

## 2020-01-03 RX ORDER — VITAMIN B COMPLEX
2000 TABLET ORAL DAILY
Status: DISCONTINUED | OUTPATIENT
Start: 2020-01-04 | End: 2020-01-05 | Stop reason: HOSPADM

## 2020-01-03 RX ORDER — GABAPENTIN 100 MG/1
100 CAPSULE ORAL 3 TIMES DAILY
Status: DISCONTINUED | OUTPATIENT
Start: 2020-01-03 | End: 2020-01-05 | Stop reason: HOSPADM

## 2020-01-03 RX ORDER — IPRATROPIUM BROMIDE AND ALBUTEROL SULFATE 2.5; .5 MG/3ML; MG/3ML
1 SOLUTION RESPIRATORY (INHALATION) ONCE
Status: COMPLETED | OUTPATIENT
Start: 2020-01-03 | End: 2020-01-03

## 2020-01-03 RX ORDER — DULOXETIN HYDROCHLORIDE 30 MG/1
30 CAPSULE, DELAYED RELEASE ORAL DAILY
Status: DISCONTINUED | OUTPATIENT
Start: 2020-01-03 | End: 2020-01-05 | Stop reason: HOSPADM

## 2020-01-03 RX ORDER — QUETIAPINE FUMARATE 25 MG/1
50 TABLET, FILM COATED ORAL NIGHTLY
Status: DISCONTINUED | OUTPATIENT
Start: 2020-01-03 | End: 2020-01-05 | Stop reason: HOSPADM

## 2020-01-03 RX ORDER — OXYCODONE HYDROCHLORIDE AND ACETAMINOPHEN 5; 325 MG/1; MG/1
1 TABLET ORAL ONCE
Status: COMPLETED | OUTPATIENT
Start: 2020-01-03 | End: 2020-01-03

## 2020-01-03 RX ORDER — SODIUM CHLORIDE 0.9 % (FLUSH) 0.9 %
10 SYRINGE (ML) INJECTION EVERY 12 HOURS SCHEDULED
Status: DISCONTINUED | OUTPATIENT
Start: 2020-01-03 | End: 2020-01-05 | Stop reason: HOSPADM

## 2020-01-03 RX ORDER — ONDANSETRON 2 MG/ML
4 INJECTION INTRAMUSCULAR; INTRAVENOUS EVERY 6 HOURS PRN
Status: DISCONTINUED | OUTPATIENT
Start: 2020-01-03 | End: 2020-01-05 | Stop reason: HOSPADM

## 2020-01-03 RX ORDER — PREDNISONE 20 MG/1
40 TABLET ORAL DAILY
Status: DISCONTINUED | OUTPATIENT
Start: 2020-01-06 | End: 2020-01-05 | Stop reason: HOSPADM

## 2020-01-03 RX ORDER — SODIUM CHLORIDE 0.9 % (FLUSH) 0.9 %
10 SYRINGE (ML) INJECTION PRN
Status: DISCONTINUED | OUTPATIENT
Start: 2020-01-03 | End: 2020-01-05 | Stop reason: HOSPADM

## 2020-01-03 RX ORDER — METHYLPREDNISOLONE SODIUM SUCCINATE 40 MG/ML
40 INJECTION, POWDER, LYOPHILIZED, FOR SOLUTION INTRAMUSCULAR; INTRAVENOUS EVERY 6 HOURS
Status: COMPLETED | OUTPATIENT
Start: 2020-01-03 | End: 2020-01-05

## 2020-01-03 RX ADMIN — MOMETASONE FUROATE AND FORMOTEROL FUMARATE DIHYDRATE 2 PUFF: 200; 5 AEROSOL RESPIRATORY (INHALATION) at 20:29

## 2020-01-03 RX ADMIN — IPRATROPIUM BROMIDE AND ALBUTEROL SULFATE 1 AMPULE: .5; 3 SOLUTION RESPIRATORY (INHALATION) at 16:48

## 2020-01-03 RX ADMIN — Medication 10 ML: at 20:22

## 2020-01-03 RX ADMIN — AZITHROMYCIN MONOHYDRATE 500 MG: 500 INJECTION, POWDER, LYOPHILIZED, FOR SOLUTION INTRAVENOUS at 16:05

## 2020-01-03 RX ADMIN — METHYLPREDNISOLONE SODIUM SUCCINATE 125 MG: 125 INJECTION, POWDER, FOR SOLUTION INTRAMUSCULAR; INTRAVENOUS at 17:21

## 2020-01-03 RX ADMIN — GABAPENTIN 100 MG: 100 CAPSULE ORAL at 20:18

## 2020-01-03 RX ADMIN — DULOXETINE HYDROCHLORIDE 30 MG: 30 CAPSULE, DELAYED RELEASE ORAL at 20:17

## 2020-01-03 RX ADMIN — OXYCODONE HYDROCHLORIDE AND ACETAMINOPHEN 1 TABLET: 5; 325 TABLET ORAL at 17:21

## 2020-01-03 RX ADMIN — IPRATROPIUM BROMIDE AND ALBUTEROL SULFATE 1 AMPULE: .5; 3 SOLUTION RESPIRATORY (INHALATION) at 20:29

## 2020-01-03 RX ADMIN — CEFTRIAXONE 1 G: 1 INJECTION, POWDER, FOR SOLUTION INTRAMUSCULAR; INTRAVENOUS at 15:35

## 2020-01-03 RX ADMIN — ATORVASTATIN CALCIUM 10 MG: 10 TABLET, FILM COATED ORAL at 20:18

## 2020-01-03 RX ADMIN — METHYLPREDNISOLONE SODIUM SUCCINATE 40 MG: 40 INJECTION, POWDER, FOR SOLUTION INTRAMUSCULAR; INTRAVENOUS at 20:17

## 2020-01-03 ASSESSMENT — PAIN DESCRIPTION - ONSET: ONSET: ON-GOING

## 2020-01-03 ASSESSMENT — PAIN SCALES - GENERAL
PAINLEVEL_OUTOF10: 4
PAINLEVEL_OUTOF10: 5
PAINLEVEL_OUTOF10: 6
PAINLEVEL_OUTOF10: 0
PAINLEVEL_OUTOF10: 4

## 2020-01-03 ASSESSMENT — ENCOUNTER SYMPTOMS
NAUSEA: 0
VOMITING: 0
SORE THROAT: 0
RHINORRHEA: 0
COUGH: 1
WHEEZING: 1
SHORTNESS OF BREATH: 1
DIARRHEA: 0

## 2020-01-03 ASSESSMENT — PAIN DESCRIPTION - FREQUENCY: FREQUENCY: CONTINUOUS

## 2020-01-03 ASSESSMENT — PAIN DESCRIPTION - LOCATION
LOCATION: BACK

## 2020-01-03 ASSESSMENT — PAIN DESCRIPTION - PROGRESSION
CLINICAL_PROGRESSION: NOT CHANGED

## 2020-01-03 ASSESSMENT — PAIN DESCRIPTION - ORIENTATION
ORIENTATION: LOWER

## 2020-01-03 ASSESSMENT — PAIN DESCRIPTION - PAIN TYPE
TYPE: ACUTE PAIN
TYPE: CHRONIC PAIN
TYPE: ACUTE PAIN

## 2020-01-03 ASSESSMENT — PAIN DESCRIPTION - DESCRIPTORS
DESCRIPTORS: ACHING

## 2020-01-03 ASSESSMENT — PAIN - FUNCTIONAL ASSESSMENT: PAIN_FUNCTIONAL_ASSESSMENT: ACTIVITIES ARE NOT PREVENTED

## 2020-01-03 NOTE — H&P
Hospital Medicine History & Physical      PCP: HECTOR Dao CNP    Date of Admission: 1/3/2020    Date of Service: Pt seen/examined on 01/03/2020 and Admitted to Inpatient with expected LOS greater than two midnights due to medical therapy. Chief Complaint:  Cough, SOB      History Of Present Illness:      69 y.o. female who presented to [facility] 2 to 3 weeks history of progressive cough and shortness of breath in the last few days become more productive shortness of breath getting more severe home medication not effective, denies fever or chills but sputum is more productive with yellow and green product, no nausea, vomiting or diarrhea still smoking no abdominal pain. No recent sick contact. Past Medical History:          Diagnosis Date    Chronic pain syndrome     Colorectal polyps     COPD (chronic obstructive pulmonary disease) (HCC)     CTS (carpal tunnel syndrome)     BILATERAL    DDD (degenerative disc disease), lumbar     Depression     Family hx of colon cancer     Fibromyalgia     Hyperlipemia     IBS (irritable bowel syndrome)     Lumbar spondylosis     Urticaria, chronic        Past Surgical History:          Procedure Laterality Date    CERVICAL POLYP REMOVAL  9/2004    TYMPANOSTOMY TUBE PLACEMENT         Medications Prior to Admission:      Prior to Admission medications    Medication Sig Start Date End Date Taking? Authorizing Provider   oxyCODONE-acetaminophen (PERCOCET) 7.5-325 MG per tablet Take 1 tablet by mouth every 6 hours as needed for Pain for up to 30 days. Max 4 a day 12/10/19 1/9/20 Yes Georgie Simon MD   pantoprazole (PROTONIX) 40 MG tablet Take 1 tablet by mouth daily 12/10/19  Yes Georgie Simon MD   gabapentin (NEURONTIN) 600 MG tablet Take 1 tablet by mouth 3 times daily for 30 days.  12/10/19 1/9/20 Yes Georgie Simon MD   QUEtiapine (SEROQUEL) 25 MG tablet Take 1-2 tablets by mouth nightly  Patient taking differently: Take 50 mg by PLAN:    1. Return chronic respiratory failure with hypoxia with oxygen saturation less than 90% on room air, oxygen therapy, IV antibiotics, IV steroids, DuoNeb, patient advised to quit smoking. 2.  COPD exacerbation, IV antibiotics IV steroids DuoNeb pulse oximetry, strongly advised to quit smoking. 3.  Fibromyalgia, no active issues  4. Tobacco abuse, counseled. 5.  Chronic pain syndrome controlled at this time    DVT Prophylaxis: lovenox  Diet: Diet NPO Effective Now  Code Status: Prior        Dispo -inpatient 2 to 3 days       Chico Barragan MD    Thank you HECTOR Sarkar - FRED for the opportunity to be involved in this patient's care. If you have any questions or concerns please feel free to contact me at [phone].

## 2020-01-03 NOTE — PROGRESS NOTES
1/3/2020    This is a 69 y.o. female   Chief Complaint   Patient presents with    Cough     x 3 weeks, was on prednisone a week ago, dr Harper Goodrich gave her a steroid taper. still hasn't gotten any better, coughing up a white color but not much, ribs hurt from coughing. no facial pain but has some chest pain from coughing, no fever. took some mucinex otc other than that, that's all she has taken, other than inhalers and breathing treatments    . Cough   This is a new problem. Episode onset: 3 weeks  The problem has been unchanged. The problem occurs hourly. The cough is productive of sputum. Associated symptoms include headaches, postnasal drip, shortness of breath and wheezing. Pertinent negatives include no chest pain, fever, rhinorrhea or sore throat. Exacerbated by: activity  Treatments tried: prednisone and albuterol  The treatment provided mild relief. Her past medical history is significant for COPD. Finished prednisone taper from Dr. Sarah Elias a couple of days ago. Did not feel that it helped with her breathing. Using the nebulizer BID. And albuterol inhaler QID. Feels slightly better after the nebulizer. Continues on the breo daily     She is wearing the nicoderm patches. Smoking 1/2 ppd or less. Was smoking 1.5 ppd. Having rib pain due to coughing.       Patient Active Problem List   Diagnosis    Chronic obstructive pulmonary disease ([address]    Osteopenia-last dexa 2009 repeat 2015 (-2.4 hip)--advised tx    Colon polyp, hyperplastic,-(done 3/11-repeat 3-5 yrs--dr Tyrell Moreland)    Family history of colon cancer    CTS (carpal tunnel syndrome)BILATERAL-s/p surgical repair--resolved     Postmenopausal status-last mammogram 2/15 wnl last pap 12/13--wnl    Nondependent alcohol abuse, in remission    Urticaria, chronic    Tobacco abuse-advised to quit--lung cancer screening neg 5/18--repeat low dose ct 1 yr    Chronic back pain-(djd) saw dr Vincent Moreno afford surgery--seeing dr Edward Chris for pain meds    Degeneration of lumbar or lumbosacral intervertebral disc    Fibromyalgia    Hypercholesteremia    Lumbar spondylosis    Vitamin D deficiency--severe--started 50,000 iu 2x/ wk 11/13    Insomnia--on elevil w help    Constipation--on daily miralax    Prediabetes--a1c was 6.3 1/16--diet advised    Depression    Chronic pain syndrome    Muscle cramping    ROLY (acute kidney injury) ([facility])    Gastroesophageal reflux disease    COPD, severe (HCC)    Chronic hypoxemic respiratory failure ([facility])    Degeneration of lumbar or lumbosacral intervertebral disc    Trochanteric bursitis of right hip    COPD exacerbation ([facility])          Current Outpatient Medications   Medication Sig Dispense Refill    oxyCODONE-acetaminophen (PERCOCET) 7.5-325 MG per tablet Take 1 tablet by mouth every 6 hours as needed for Pain for up to 30 days. Max 4 a day 120 tablet 0    pantoprazole (PROTONIX) 40 MG tablet Take 1 tablet by mouth daily 30 tablet 1    gabapentin (NEURONTIN) 600 MG tablet Take 1 tablet by mouth 3 times daily for 30 days.  90 tablet 1    QUEtiapine (SEROQUEL) 25 MG tablet Take 1-2 tablets by mouth nightly 60 tablet 0    DULoxetine (CYMBALTA) 60 MG extended release capsule Take one tablet at night 30 capsule 1    tiZANidine (ZANAFLEX) 4 MG tablet Take one tablet po BID 60 tablet 1    meloxicam (MOBIC) 15 MG tablet TAKE 1 TABLET BY MOUTH DAILY 30 tablet 1    atorvastatin (LIPITOR) 80 MG tablet TAKE 1 TABLET BY MOUTH EVERY DAY FOR CHOLESTEROL 90 tablet 1    Fluticasone Furoate-Vilanterol (BREO ELLIPTA) 200-25 MCG/INH AEPB INHALE 1 PUFF INTO THE LUNGS DAILY 1 each 5    ipratropium-albuterol (DUONEB) 0.5-2.5 (3) MG/3ML SOLN nebulizer solution Take 1 aerosol every 4 hours for 2 days and then as needed for shortness of breath coughing and wheezing 360 mL 3    Cholecalciferol (VITAMIN D) 2000 units TABS tablet Take 1 tablet by mouth daily 30 tablet     cetirizine (ZYRTEC) 10 MG tablet Take 10 mg by mouth daily as needed for Allergies      Nebulizers (COMPRESSOR/NEBULIZER) MISC 1 Units by Does not apply route 4 times daily 1 each 3    albuterol sulfate  (90 Base) MCG/ACT inhaler Inhale 2 puffs into the lungs every 6 hours as needed for Shortness of Breath 1 Inhaler 5     No current facility-administered medications for this visit. No Known Allergies    Review of Systems   Constitutional: Positive for activity change and fatigue. Negative for fever. HENT: Positive for congestion and postnasal drip. Negative for rhinorrhea and sore throat. Respiratory: Positive for cough, shortness of breath and wheezing. Sputum production is thick white. Cardiovascular: Negative for chest pain. Gastrointestinal: Negative for diarrhea, nausea and vomiting. Neurological: Positive for dizziness and headaches. Vitals:    01/03/20 1343   BP: 114/72   Site: Right Upper Arm   Position: Sitting   Cuff Size: Medium Adult   Pulse: 66   Resp: 16   Temp: 97.9 °F (36.6 °C)   TempSrc: Oral   SpO2: (!) 89%   Weight: 149 lb (67.6 kg)   Height: 5' 4\" (1.626 m)       Body mass index is 25.58 kg/m². Wt Readings from Last 3 Encounters:   01/03/20 149 lb (67.6 kg)   12/10/19 150 lb (68 kg)   11/13/19 150 lb (68 kg)       BP Readings from Last 3 Encounters:   01/03/20 114/72   12/10/19 116/66   11/13/19 123/69       Physical Exam  Vitals signs and nursing note reviewed. Constitutional:       General: She is not in acute distress. Appearance: She is well-developed. HENT:      Head: Normocephalic and atraumatic. Neck:      Musculoskeletal: Neck supple. Cardiovascular:      Rate and Rhythm: Normal rate and regular rhythm. Heart sounds: Normal heart sounds. No murmur. No friction rub. No gallop. Pulmonary:      Effort: Pulmonary effort is normal. No respiratory distress. Breath sounds: Decreased breath sounds, wheezing and rhonchi present. Comments: Wheezing throughout.   Rhonchi

## 2020-01-03 NOTE — ED TRIAGE NOTES
Pt arrives to the ER via private vehicle with complaints of shortness of breath. Pt states that it has been going on for a few days, states that it got worse. NAD noted, respirations even and easy, skin warm and dry.

## 2020-01-03 NOTE — ED PROVIDER NOTES
[facility]      Pt Name: Andrade Drew  MRN: [id number]  [place] 1945  Date of evaluation: 1/3/2020  Provider: Barbara Gutierrez, [address] [place] [address]       Chief Complaint   Patient presents with    Shortness of Breath         HISTORY OF PRESENT ILLNESS   (Location/Symptom, Timing/Onset, Context/Setting, Quality, Duration, Modifying Factors, Severity)  Note limiting factors. Andrade Drew is a 69 y.o. female who presents to the emergency department with complaint of shortness of breath, cough, white productive sputum. The patient states that she initially became ill approximately 2 weeks ago with sinus drainage, rhinorrhea, and a slight sore throat. This progressed to a cough which has progressively worsened. She now reports shortness of breath and dyspnea on exertion. She does have a history of advanced COPD but does not wear oxygen. No current steroid or antibiotic usage. She does nebulizer treatments at home. She did get a flu shot this year. She went to see the nurse practitioner in the office today who noted that she was hypoxic with very labored breathing and was sent to the emergency department for possible admission. She denies any hemoptysis. She denies any chest pain heaviness pressure or tightness. She does report some soreness in her ribs from coughing so much. She denies any dysuria or hematuria. No nausea vomiting or diarrhea. Appetite has been good. Urine output is been normal.    She continues to smoke. She denies any history of coronary artery disease. HPI    Nursing Notes were reviewed. REVIEW OF SYSTEMS    (2-9 systems for level 4, 10 or more for level 5)       Constitutional: Negative for fever or chills. Eyes: Negative for redness or drainage. Cardiovascular: Negative for chest pain. Gastrointestinal: Negative for abdominal pain. Negative for vomiting or diarrhea.    Genitourinary: Negative for flank pain. Negative for dysuria. Negative for hematuria. Neurological: Negative for headache. Musculoskeletal:  Negative edema. Hematological: Negative for adenopathy. All systems are reviewed and are negative except for those listed above in the history of present illness and ROS. PAST MEDICAL HISTORY     Past Medical History:   Diagnosis Date    Chronic pain syndrome     Colorectal polyps     COPD (chronic obstructive pulmonary disease) (HCC)     CTS (carpal tunnel syndrome)     BILATERAL    DDD (degenerative disc disease), lumbar     Depression     Family hx of colon cancer     Fibromyalgia     Hyperlipemia     IBS (irritable bowel syndrome)     Lumbar spondylosis     Urticaria, chronic          SURGICAL HISTORY       Past Surgical History:   Procedure Laterality Date    CERVICAL POLYP REMOVAL  9/2004    TYMPANOSTOMY TUBE PLACEMENT           CURRENT MEDICATIONS       Previous Medications    ALBUTEROL SULFATE  (90 BASE) MCG/ACT INHALER    Inhale 2 puffs into the lungs every 6 hours as needed for Shortness of Breath    ATORVASTATIN (LIPITOR) 80 MG TABLET    TAKE 1 TABLET BY MOUTH EVERY DAY FOR CHOLESTEROL    CETIRIZINE (ZYRTEC) 10 MG TABLET    Take 10 mg by mouth daily as needed for Allergies    CHOLECALCIFEROL (VITAMIN D) 2000 UNITS TABS TABLET    Take 1 tablet by mouth daily    DULOXETINE (CYMBALTA) 60 MG EXTENDED RELEASE CAPSULE    Take one tablet at night    FLUTICASONE FUROATE-VILANTEROL (BREO ELLIPTA) 200-25 MCG/INH AEPB    INHALE 1 PUFF INTO THE LUNGS DAILY    GABAPENTIN (NEURONTIN) 600 MG TABLET    Take 1 tablet by mouth 3 times daily for 30 days.     IPRATROPIUM-ALBUTEROL (DUONEB) 0.5-2.5 (3) MG/3ML SOLN NEBULIZER SOLUTION    Take 1 aerosol every 4 hours for 2 days and then as needed for shortness of breath coughing and wheezing    MELOXICAM (MOBIC) 15 MG TABLET    TAKE 1 TABLET BY MOUTH DAILY    NEBULIZERS (COMPRESSOR/NEBULIZER) MISC    1 Units by Does abused: None     Forced sexual activity: None   Other Topics Concern    None   Social History Narrative    None       SCREENINGS             PHYSICAL EXAM    (up to 7 for level 4, 8 or more for level 5)     ED Triage Vitals [01/03/20 1420]   BP Temp Temp Source Pulse Resp SpO2 Height Weight   120/67 98.3 °F (36.8 °C) Oral 69 18 (!) 88 % -- --         Physical Exam   Constitutional: Awake and alert. Nontoxic. Head: No visible evidence of trauma. Normocephalic. Eyes: Pupils equal and reactive. No photophobia. Conjunctiva normal.    HENT: Oral mucosa moist.  Airway patent. Pharynx without erythema. Nares were clear. Neck:  Soft and supple. Nontender. Heart:  Regular rate and rhythm. No murmur. Lungs: Diminished breath sounds bilaterally with diffuse expiratory wheezes. Bibasilar rales were noted. Conversational dyspnea was noted. Slight accessory muscle use was noted. Chest: Chest wall non-tender. No evidence of trauma. Abdomen:  Soft, nondistended, bowel sounds present. Nontender. No guarding rigidity or rebound. No masses. Musculoskeletal: Extremities non-tender with full range of motion. Radial and dorsalis pedis pulses were intact. No calf tenderness erythema or edema. Neurological: Alert and oriented x 3. Speech clear. Cranial nerves II-XII intact. No facial droop. No acute focal motor or sensory deficits. Skin: Skin is warm and dry. No rash. Lymphatic:  No lympadenopathy. Psychiatric: Normal mood and affect. Behavior is normal.         DIAGNOSTIC RESULTS     EKG: All EKG's are interpreted by the Emergency Department Physician who either signs or Co-signs this chart in the absence of a cardiologist.    Normal sinus rhythm. Rate 63. OR interval 146 ms. QRS duration 78 ms. QTc 427 ms.  R axis 50 degrees. No ST elevation. Normal EKG.     RADIOLOGY:   Non-plain film images such as CT, Ultrasound and MRI are read by the radiologist. Plain radiographic images are visualized and preliminarily interpreted by the emergency physician with the below findings:        Interpretation per the Radiologist below, if available at the time of this note:    XR CHEST STANDARD (2 VW)   Final Result   No acute cardiopulmonary process. ED BEDSIDE ULTRASOUND:   Performed by ED Physician - none    LABS:  Labs Reviewed   CBC WITH AUTO DIFFERENTIAL - Abnormal; Notable for the following components:       Result Value    RBC 3.96 (*)     All other components within normal limits    Narrative:     Performed at:  [facility]  [address] [place] [facility] [place] [address]   Phone [phone]   BASIC METABOLIC PANEL W/ REFLEX TO MG FOR LOW K - Abnormal; Notable for the following components:    Glucose 113 (*)     All other components within normal limits    Narrative:     Performed at:  [facility]  [address], [place] [facility] [address] [address]   Phone [phone]   RAPID INFLUENZA A/B ANTIGENS    Narrative:     Performed at:  [facility]  [address], [facility] [address] [address]   Phone [phone]   CULTURE BLOOD #1   CULTURE BLOOD #2   BRAIN NATRIURETIC PEPTIDE    Narrative:     Performed at:  [facility]  [address], [facility] [address] [address]   Phone [phone]   TROPONIN    Narrative:     Performed at:  [facility]  [address] [facility] [address] [address]   Phone [phone]   LACTATE, SEPSIS    Narrative:     Performed at:  [facility]  [address], [place] [facility] [address] [address]   Phone [phone]       All other labs were within normal range or not returned as of this dictation.     EMERGENCY DEPARTMENT COURSE and DIFFERENTIAL DIAGNOSIS/MDM:   Vitals:    Vitals:    01/03/20 1516 01/03/20 1531 01/03/20 1546 01/03/20 1601   BP: (!) 133/57 132/61 126/62 137/62   Pulse: 59 58 70 66   Resp: 18 18 19 13   Temp:       TempSrc:       SpO2: 97% 97% 99% 96%   Weight:       Height:           The patient presented with cough, white productive sputum, dyspnea, dyspnea on exertion, wheezing, and hypoxia. She was noted to be hypoxic with an oxygen saturation of 88% on room air. She was placed on 2 L of oxygen nasal cannula and improved to 94%. She was noted to have diffuse expiratory wheezes with diminished breath sounds, conversational dyspnea, and mild accessory muscle use. Does have bibasilar rales on exam.  She was given DuoNeb treatments and Solu-Medrol 125 mg IV. MDM      REASSESSMENT          4:01 PM: Chest x-ray was reviewed. No evidence of infiltrate. Ever, she does have clinical evidence of pneumonia with bibasilar crackles. She was given Rocephin and Zithromax. Cultures are pending. Patient continues to be hypoxic. She normally does not wear oxygen. She will be admitted for further treatment and evaluation. A call was placed to the hospitalist on-call for admission. 4:41 PM: Care was discussed with Dr. Jelly Loredo. CRITICAL CARE TIME   Total Critical Care time was 30 minutes, excluding separately reportable procedures. There was a high probability of clinically significant/life threatening deterioration in the patient's condition which required my urgent intervention. CONSULTS:  None    PROCEDURES:  Unless otherwise noted below, none     Procedures        FINAL IMPRESSION      1. Acute exacerbation of chronic obstructive pulmonary disease (COPD) ([facility]    2. Hypoxia    3. Pneumonia due to organism          DISPOSITION/PLAN   DISPOSITION Decision To Admit 01/03/2020 04:12:10 PM      PATIENT REFERRED TO:  No follow-up provider specified. DISCHARGE MEDICATIONS:  New Prescriptions    No medications on file     Controlled Substances Monitoring:     RX Monitoring 5/15/2019   Attestation The Prescription Monitoring Report for this patient was reviewed today.        (Please note that

## 2020-01-03 NOTE — ED NOTES
Pharmacy Medication Reconciliation Note     List of medications patient is currently taking is complete. Allergies:  Patient has no known allergies. Source of information:   1.  EMR  2. patient    Notes regarding home medications:   1. reports she took her AM meds  2. has been holding meloxicam to see if it was causing a rash, however, it has persisted    Denies taking any other OTC or herbal medications    Shea Maki PharmD, BCPS  1/3/2020  6:07 PM

## 2020-01-04 LAB
A/G RATIO: 1.2 (ref 1.1–2.2)
ALBUMIN SERPL-MCNC: 3.8 G/DL (ref 3.4–5)
ALP BLD-CCNC: 92 U/L (ref 40–129)
ALT SERPL-CCNC: 16 U/L (ref 10–40)
ANION GAP SERPL CALCULATED.3IONS-SCNC: 17 MMOL/L (ref 3–16)
AST SERPL-CCNC: 14 U/L (ref 15–37)
BASOPHILS ABSOLUTE: 0 K/UL (ref 0–0.2)
BASOPHILS RELATIVE PERCENT: 0.1 %
BILIRUB SERPL-MCNC: 0.3 MG/DL (ref 0–1)
BUN BLDV-MCNC: 16 MG/DL (ref 7–20)
CALCIUM SERPL-MCNC: 9.5 MG/DL (ref 8.3–10.6)
CHLORIDE BLD-SCNC: 100 MMOL/L (ref 99–110)
CO2: 21 MMOL/L (ref 21–32)
CREAT SERPL-MCNC: 0.7 MG/DL (ref 0.6–1.2)
EOSINOPHILS ABSOLUTE: 0 K/UL (ref 0–0.6)
EOSINOPHILS RELATIVE PERCENT: 0 %
GFR AFRICAN AMERICAN: >60
GFR NON-AFRICAN AMERICAN: >60
GLOBULIN: 3.2 G/DL
GLUCOSE BLD-MCNC: 177 MG/DL (ref 70–99)
HCT VFR BLD CALC: 41.1 % (ref 36–48)
HEMOGLOBIN: 13.3 G/DL (ref 12–16)
LYMPHOCYTES ABSOLUTE: 0.7 K/UL (ref 1–5.1)
LYMPHOCYTES RELATIVE PERCENT: 4.9 %
MCH RBC QN AUTO: 31.1 PG (ref 26–34)
MCHC RBC AUTO-ENTMCNC: 32.4 G/DL (ref 31–36)
MCV RBC AUTO: 96 FL (ref 80–100)
MONOCYTES ABSOLUTE: 0.2 K/UL (ref 0–1.3)
MONOCYTES RELATIVE PERCENT: 1.1 %
NEUTROPHILS ABSOLUTE: 13.6 K/UL (ref 1.7–7.7)
NEUTROPHILS RELATIVE PERCENT: 93.9 %
PDW BLD-RTO: 14.7 % (ref 12.4–15.4)
PLATELET # BLD: 310 K/UL (ref 135–450)
PMV BLD AUTO: 6.9 FL (ref 5–10.5)
POTASSIUM REFLEX MAGNESIUM: 4.7 MMOL/L (ref 3.5–5.1)
PROCALCITONIN: 0.02 NG/ML (ref 0–0.15)
RBC # BLD: 4.28 M/UL (ref 4–5.2)
SODIUM BLD-SCNC: 138 MMOL/L (ref 136–145)
TOTAL PROTEIN: 7 G/DL (ref 6.4–8.2)
WBC # BLD: 14.5 K/UL (ref 4–11)

## 2020-01-04 PROCEDURE — 6370000000 HC RX 637 (ALT 250 FOR IP): Performed by: INTERNAL MEDICINE

## 2020-01-04 PROCEDURE — 97161 PT EVAL LOW COMPLEX 20 MIN: CPT

## 2020-01-04 PROCEDURE — 85025 COMPLETE CBC W/AUTO DIFF WBC: CPT

## 2020-01-04 PROCEDURE — 6360000002 HC RX W HCPCS: Performed by: INTERNAL MEDICINE

## 2020-01-04 PROCEDURE — 36415 COLL VENOUS BLD VENIPUNCTURE: CPT

## 2020-01-04 PROCEDURE — 1200000000 HC SEMI PRIVATE

## 2020-01-04 PROCEDURE — 94640 AIRWAY INHALATION TREATMENT: CPT

## 2020-01-04 PROCEDURE — 96372 THER/PROPH/DIAG INJ SC/IM: CPT

## 2020-01-04 PROCEDURE — 97535 SELF CARE MNGMENT TRAINING: CPT

## 2020-01-04 PROCEDURE — 6370000000 HC RX 637 (ALT 250 FOR IP): Performed by: NURSE PRACTITIONER

## 2020-01-04 PROCEDURE — 2700000000 HC OXYGEN THERAPY PER DAY

## 2020-01-04 PROCEDURE — 96376 TX/PRO/DX INJ SAME DRUG ADON: CPT

## 2020-01-04 PROCEDURE — 97116 GAIT TRAINING THERAPY: CPT

## 2020-01-04 PROCEDURE — 96366 THER/PROPH/DIAG IV INF ADDON: CPT

## 2020-01-04 PROCEDURE — 97165 OT EVAL LOW COMPLEX 30 MIN: CPT

## 2020-01-04 PROCEDURE — 80053 COMPREHEN METABOLIC PANEL: CPT

## 2020-01-04 PROCEDURE — 2580000003 HC RX 258: Performed by: INTERNAL MEDICINE

## 2020-01-04 PROCEDURE — 84145 PROCALCITONIN (PCT): CPT

## 2020-01-04 RX ORDER — NICOTINE 21 MG/24HR
1 PATCH, TRANSDERMAL 24 HOURS TRANSDERMAL DAILY
Status: DISCONTINUED | OUTPATIENT
Start: 2020-01-04 | End: 2020-01-05 | Stop reason: HOSPADM

## 2020-01-04 RX ORDER — OXYCODONE AND ACETAMINOPHEN 7.5; 325 MG/1; MG/1
1 TABLET ORAL EVERY 6 HOURS PRN
Status: DISCONTINUED | OUTPATIENT
Start: 2020-01-04 | End: 2020-01-05 | Stop reason: HOSPADM

## 2020-01-04 RX ADMIN — IPRATROPIUM BROMIDE AND ALBUTEROL SULFATE 1 AMPULE: .5; 3 SOLUTION RESPIRATORY (INHALATION) at 17:00

## 2020-01-04 RX ADMIN — GABAPENTIN 100 MG: 100 CAPSULE ORAL at 07:38

## 2020-01-04 RX ADMIN — QUETIAPINE FUMARATE 50 MG: 25 TABLET ORAL at 00:03

## 2020-01-04 RX ADMIN — ENOXAPARIN SODIUM 40 MG: 40 INJECTION SUBCUTANEOUS at 07:38

## 2020-01-04 RX ADMIN — TIZANIDINE 2 MG: 4 TABLET ORAL at 05:52

## 2020-01-04 RX ADMIN — METHYLPREDNISOLONE SODIUM SUCCINATE 40 MG: 40 INJECTION, POWDER, FOR SOLUTION INTRAMUSCULAR; INTRAVENOUS at 18:51

## 2020-01-04 RX ADMIN — MOMETASONE FUROATE AND FORMOTEROL FUMARATE DIHYDRATE 2 PUFF: 200; 5 AEROSOL RESPIRATORY (INHALATION) at 21:43

## 2020-01-04 RX ADMIN — OXYCODONE HYDROCHLORIDE AND ACETAMINOPHEN 1 TABLET: 7.5; 325 TABLET ORAL at 20:57

## 2020-01-04 RX ADMIN — QUETIAPINE FUMARATE 50 MG: 25 TABLET ORAL at 20:57

## 2020-01-04 RX ADMIN — IPRATROPIUM BROMIDE AND ALBUTEROL SULFATE 1 AMPULE: .5; 3 SOLUTION RESPIRATORY (INHALATION) at 12:16

## 2020-01-04 RX ADMIN — VITAMIN D, TAB 1000IU (100/BT) 2000 UNITS: 25 TAB at 07:38

## 2020-01-04 RX ADMIN — IPRATROPIUM BROMIDE AND ALBUTEROL SULFATE 1 AMPULE: .5; 3 SOLUTION RESPIRATORY (INHALATION) at 21:42

## 2020-01-04 RX ADMIN — CEFTRIAXONE 1 G: 1 INJECTION, POWDER, FOR SOLUTION INTRAMUSCULAR; INTRAVENOUS at 15:15

## 2020-01-04 RX ADMIN — IPRATROPIUM BROMIDE AND ALBUTEROL SULFATE 1 AMPULE: .5; 3 SOLUTION RESPIRATORY (INHALATION) at 08:26

## 2020-01-04 RX ADMIN — OXYCODONE HYDROCHLORIDE AND ACETAMINOPHEN 1 TABLET: 7.5; 325 TABLET ORAL at 14:12

## 2020-01-04 RX ADMIN — GABAPENTIN 100 MG: 100 CAPSULE ORAL at 20:57

## 2020-01-04 RX ADMIN — Medication 10 ML: at 07:41

## 2020-01-04 RX ADMIN — OXYCODONE HYDROCHLORIDE AND ACETAMINOPHEN 1 TABLET: 7.5; 325 TABLET ORAL at 07:52

## 2020-01-04 RX ADMIN — PANTOPRAZOLE SODIUM 40 MG: 40 TABLET, DELAYED RELEASE ORAL at 07:38

## 2020-01-04 RX ADMIN — AZITHROMYCIN MONOHYDRATE 500 MG: 500 INJECTION, POWDER, LYOPHILIZED, FOR SOLUTION INTRAVENOUS at 16:13

## 2020-01-04 RX ADMIN — GABAPENTIN 100 MG: 100 CAPSULE ORAL at 14:13

## 2020-01-04 RX ADMIN — METHYLPREDNISOLONE SODIUM SUCCINATE 40 MG: 40 INJECTION, POWDER, FOR SOLUTION INTRAMUSCULAR; INTRAVENOUS at 01:48

## 2020-01-04 RX ADMIN — ATORVASTATIN CALCIUM 10 MG: 10 TABLET, FILM COATED ORAL at 07:38

## 2020-01-04 RX ADMIN — DULOXETINE HYDROCHLORIDE 30 MG: 30 CAPSULE, DELAYED RELEASE ORAL at 07:38

## 2020-01-04 RX ADMIN — METHYLPREDNISOLONE SODIUM SUCCINATE 40 MG: 40 INJECTION, POWDER, FOR SOLUTION INTRAMUSCULAR; INTRAVENOUS at 13:25

## 2020-01-04 RX ADMIN — METHYLPREDNISOLONE SODIUM SUCCINATE 40 MG: 40 INJECTION, POWDER, FOR SOLUTION INTRAMUSCULAR; INTRAVENOUS at 07:38

## 2020-01-04 RX ADMIN — MOMETASONE FUROATE AND FORMOTEROL FUMARATE DIHYDRATE 2 PUFF: 200; 5 AEROSOL RESPIRATORY (INHALATION) at 08:26

## 2020-01-04 RX ADMIN — OXYCODONE HYDROCHLORIDE AND ACETAMINOPHEN 1 TABLET: 7.5; 325 TABLET ORAL at 01:47

## 2020-01-04 ASSESSMENT — PAIN DESCRIPTION - PROGRESSION
CLINICAL_PROGRESSION: NOT CHANGED

## 2020-01-04 ASSESSMENT — PAIN DESCRIPTION - PAIN TYPE
TYPE: CHRONIC PAIN
TYPE: CHRONIC PAIN
TYPE: ACUTE PAIN
TYPE: ACUTE PAIN;CHRONIC PAIN
TYPE: CHRONIC PAIN
TYPE: CHRONIC PAIN;ACUTE PAIN
TYPE: CHRONIC PAIN

## 2020-01-04 ASSESSMENT — PAIN DESCRIPTION - FREQUENCY
FREQUENCY: CONTINUOUS

## 2020-01-04 ASSESSMENT — PAIN DESCRIPTION - LOCATION
LOCATION: BACK;RIB CAGE
LOCATION: BACK
LOCATION: RIB CAGE
LOCATION: BACK
LOCATION: BACK;RIB CAGE

## 2020-01-04 ASSESSMENT — PAIN - FUNCTIONAL ASSESSMENT
PAIN_FUNCTIONAL_ASSESSMENT: ACTIVITIES ARE NOT PREVENTED

## 2020-01-04 ASSESSMENT — PAIN SCALES - GENERAL
PAINLEVEL_OUTOF10: 3
PAINLEVEL_OUTOF10: 5
PAINLEVEL_OUTOF10: 5
PAINLEVEL_OUTOF10: 4
PAINLEVEL_OUTOF10: 7
PAINLEVEL_OUTOF10: 6
PAINLEVEL_OUTOF10: 5
PAINLEVEL_OUTOF10: 6

## 2020-01-04 ASSESSMENT — PAIN DESCRIPTION - DESCRIPTORS
DESCRIPTORS: ACHING
DESCRIPTORS: ACHING;SORE

## 2020-01-04 ASSESSMENT — PAIN DESCRIPTION - ORIENTATION
ORIENTATION: RIGHT;LEFT
ORIENTATION: LOWER

## 2020-01-04 ASSESSMENT — PAIN DESCRIPTION - ONSET
ONSET: ON-GOING

## 2020-01-04 NOTE — PROGRESS NOTES
Physical Therapy    Facility/Department: [address] MED SURG  Initial Assessment / Discharge    NAME: Bethann Lefort  :   MRN: [id number]    Date of Service: 2020    Discharge Recommendations:  Home with assist PRN   Bethann Lefort scored a 24/24 on the AM-PAC short mobility form. PT Equipment Recommendations  Equipment Needed: No    Assessment   Assessment: Pt is a 69 y.o. female presenting to [facility] with 2 to 3 week h/o progressive cough and SOB. Pt admitted with COPD exacerbation. PMHx includes: fibromyalgia, DDD, depression, avril CTS, COPD, chronic pain syndrome. Prior to admit padmini lived with sig other and was indep in all functional mobility, including gait with no device. She does not drive. At 2020 session, patient was independent in all functional mobility. No therapy needs identified. Rec home as prior, per medical OK. Decision Making: Low Complexity  History: as noted  Clinical Presentation: Stable  PT Education: Goals;PT Role;Plan of Care  Patient Education: Importance of coiling O2 line in room as she ambulates. Patient reports she has been doing this. REQUIRES PT FOLLOW UP: Yes  Activity Tolerance  Activity Tolerance: Patient Tolerated treatment well  Activity Tolerance: 2 L O2 throughout. 95%  resting; 92%  following stairs;  95%  once back in room following gait and stairs. Patient Diagnosis(es): The primary encounter diagnosis was Acute exacerbation of chronic obstructive pulmonary disease (COPD) ([facility] [address] Diagnoses of Hypoxia and Pneumonia due to organism were also pertinent to this visit.      has a past medical history of Chronic pain syndrome, Colorectal polyps, COPD (chronic obstructive pulmonary disease) (HCC), CTS (carpal tunnel syndrome), DDD (degenerative disc disease), lumbar, Depression, Family hx of colon cancer, Fibromyalgia, Hyperlipemia, IBS (irritable bowel syndrome), Lumbar spondylosis, and Urticaria, chronic.   has a past surgical history that includes Tympanostomy tube placement and Cervical polyp removal (9/2004). Restrictions  Position Activity Restriction  Other position/activity restrictions: Pt is a 69 y.o. female presenting to [facility] with 2 to 3 week h/o progressive cough and SOB. Pt admitted with COPD exacerbation. PMHx includes: fibromyalgia, DDD, depression, avril CTS, COPD, chronic pain syndrome  Vision/Hearing  Vision: Impaired  Vision Exceptions: Wears glasses at all times; Cataracts  Hearing: Within functional limits     Subjective  General  Chart Reviewed: Yes  Additional Pertinent Hx: Pt is a 69 y.o. female presenting to [facility] with 2 to 3 week h/o progressive cough and SOB. Pt admitted with COPD exacerbation. PMHx includes: fibromyalgia, DDD, depression, avril CTS, COPD, chronic pain syndrome  Response To Previous Treatment: Not applicable  Family / Caregiver Present: No  Referring Practitioner: Dr. Michelle Dubois  Referral Date : 01/03/20  Subjective  Subjective: Patient in [facility] chair. Awake. Agreeable to therapy. Reports lowback pain from coughing (doesn't rate), but has had a pain pill. Orientation  Orientation  Overall Orientation Status: Within Normal Limits     Social/Functional History  Social/Functional History  Lives With: Spouse([facility]24 [facility] and living with pt temporarily))  Type of Home: House  Home Layout: One level, Laundry in basement(11 LANNY to basement with L handrail)  Home Access: Level entry  Bathroom Shower/Tub: Tub/Shower unit, Curtain, Shower chair with back  Bathroom Toilet: Standard(sink nearby)  Bathroom Equipment: Shower chair, Grab bars in shower, Hand-held shower  Home Equipment: Cane, Reacher, Sock aid, Long-handled shoehorn  ADL Assistance: Independent  Homemaking Assistance: Independent(Pt does laundry, cleaning.  Spouse does cooking and grocery shopping.)  Ambulation Assistance: Independent(with no AD)  Transfer Assistance: Independent  Active : No  Patient's  Info: spouse   Occupation: Retired  Additional Comments: Pt sleeps in regular bed. Objective  AROM RLE (degrees)  RLE AROM: WFL  AROM LLE (degrees)  LLE AROM : WFL  Strength RLE  Strength RLE: WFL  Strength LLE  Strength LLE: WFL  Sensation  Overall Sensation Status: WNL  Bed mobility  Comment: Not observed. Reports independent. Transfers  Sit to Stand: Independent(BS chair, commode)  Stand to sit: Independent(BS chair, commode)  Ambulation  Ambulation?: Yes  Ambulation 1  Surface: level tile  Device: No Device  Assistance: Independent  Quality of Gait: Controlled, step through pattern. No loss of balance  Gait Deviations: None  Distance: 75', then does stairs, then return 75'  Stairs/Curb  Stairs?: Yes  Stairs  # Steps : 12  Stairs Height: 6\"  Rails: Right ascending  Assistance: Supervision  Comment: controlled, step through pattern. Reports fatigue at top of steps; stands at top of steps x 30 seconds to rest prior to descending. Balance  Posture: Good  Sitting - Static: Good  Sitting - Dynamic: Good  Standing - Static: Good  Standing - Dynamic: Good    Plan   Safety Devices  Type of devices: Call light within reach, Left in chair(RN Rosemarie Jobs informed.)    AM-PAC Score  AM-PAC Inpatient Mobility Raw Score : 24 (01/04/20 1006)  AM-PAC Inpatient T-Scale Score : 61.14 (01/04/20 1006)  Mobility Inpatient CMS 0-100% Score: 0 (01/04/20 1006)  Mobility Inpatient CMS G-Code Modifier : [facility] (01/04/20 1006)     Goals  Patient Goals   Patient goals : \"To get better and go home. \"     Therapy Time   Individual Concurrent Group Co-treatment   Time In 4788         Time Out 1016         Minutes 1100 Alissa Haque December  Electronically signed by Moises Mcdowell, PT [id number] on 1/4/2020 at 10:21 AM

## 2020-01-04 NOTE — PROGRESS NOTES
Hospitalist Progress Note      PCP: HECTOR Harley - CNP    Date of Admission: 1/3/2020        Subjective: feels better, less cough and SOB, up to chair. Medications:  Reviewed    Infusion Medications   Scheduled Medications    atorvastatin  10 mg Oral Daily    Vitamin D  2,000 Units Oral Daily    DULoxetine  30 mg Oral Daily    mometasone-formoterol  2 puff Inhalation BID    gabapentin  100 mg Oral TID    pantoprazole  40 mg Oral Daily    QUEtiapine  50 mg Oral Nightly    sodium chloride flush  10 mL Intravenous 2 times per day    enoxaparin  40 mg Subcutaneous Daily    ipratropium-albuterol  1 ampule Inhalation Q4H WA    cefTRIAXone (ROCEPHIN) IV  1 g Intravenous Q24H    azithromycin  500 mg Intravenous Q24H    methylPREDNISolone  40 mg Intravenous Q6H    Followed by   Briseida Gomez ON 1/6/2020] predniSONE  40 mg Oral Daily     PRN Meds: oxyCODONE-acetaminophen, tiZANidine, sodium chloride flush, magnesium hydroxide, ondansetron      Intake/Output Summary (Last 24 hours) at 1/4/2020 1340  Last data filed at 1/4/2020 0939  Gross per 24 hour   Intake 730 ml   Output --   Net 730 ml       Physical Exam Performed:    /71   Pulse 99   Temp 98.1 °F (36.7 °C) (Oral)   Resp 18   Ht 5' 4\" (1.626 m)   Wt 146 lb 9.7 oz (66.5 kg)   SpO2 93%   BMI 25.16 kg/m²     General appearance: No apparent distress  Neck: Supple  Respiratory:  Less wheezes   Cardiovascular: Regular rate and rhythm with normal S1/S2 without murmurs, rubs or gallops. Abdomen: Soft, non-tender  Musculoskeletal: No clubbing  Skin: Skin color, texture, turgor normal.  No rashes or lesions.   Neurologic:  No focal weakness   Psychiatric: Alert and oriented  Capillary Refill: Brisk,< 3 seconds   Peripheral Pulses: +2 palpable, equal bilaterally       Labs:   Recent Labs     01/03/20  1431 01/04/20  0800   WBC 10.5 14.5*   HGB 12.7 13.3   HCT 37.8 41.1    310     Recent Labs     01/03/20  1431 01/04/20  0800    138   K 4.2 4.7   CL 99 100   CO2 27 21   BUN 14 16   CREATININE 0.9 0.7   CALCIUM 8.4 9.5     Recent Labs     01/04/20  0800   AST 14*   ALT 16   BILITOT 0.3   ALKPHOS 92     No results for input(s): INR in the last 72 hours. Recent Labs     01/03/20  1431   TROPONINI <0.01       Urinalysis:      Lab Results   Component Value Date    NITRU Negative 10/25/2019    WBCUA 42 10/25/2019    BACTERIA RARE 10/25/2019    RBCUA 4 10/25/2019    BLOODU Negative 10/25/2019    SPECGRAV 1.018 10/25/2019    GLUCOSEU Negative 10/25/2019       Radiology:  XR CHEST STANDARD (2 VW)   Final Result   No acute cardiopulmonary process. Assessment/Plan:    Active Hospital Problems    Diagnosis    COPD exacerbation ([facility] [address] [J44.1]    Acute and chronic respiratory failure with hypoxia ([facility]) [J96.21]    Fibromyalgia [M79.7]     1. Acute on chronic respiratory failure with hypoxia with oxygen saturation less than 90% on room air, oxygen therapy, IV antibiotics, IV steroids, DuoNeb, patient advised to quit smoking again today. clinically better. 2.  COPD exacerbation, IV antibiotics IV steroids DuoNeb pulse oximetry, feeling better. 3.  Fibromyalgia, no active issues  4. Tobacco abuse, counseled.   5.  Chronic pain syndrome controlled at this time      Diet: DIET GENERAL;  Code Status: Full Code      Nancee Dubin, MD

## 2020-01-04 NOTE — PROGRESS NOTES
Pt awake and alert, c/o pain to her back and ribs from coughing. Secure message sent to [facility] NP and received new order for percocet that pt takes at home for chronic pain. Percocet given. Call light and needs in reach. Will monitor.    Electronically signed by Lazaro Colón RN on 1/4/2020 at 1:54 AM

## 2020-01-04 NOTE — PROGRESS NOTES
4 Eyes Skin Assessment     The patient is being assess for  Admission    I agree that 2 RN's have performed a thorough Head to Toe Skin Assessment on the patient. ALL assessment sites listed below have been assessed. Areas assessed by both nurses: [address]  [x]   Head, Face, and Ears   [x]   Shoulders, Back, and Chest  [x]   Arms, Elbows, and Hands   [x]   Coccyx, Sacrum, and IschIum  [x]   Legs, Feet, and Heels        Does the Patient have Skin Breakdown?   No         Uriel Prevention initiated:  No   Wound Care Orders initiated:  No      [facility] nurse consulted for Pressure Injury (Stage 3,4, Unstageable, DTI, NWPT, and Complex wounds), New and Established Ostomies:  No      Nurse 1 eSignature: Electronically signed by Shaun Francisco RN on 1/3/20 at 7:14 PM    **SHARE this note so that the co-signing nurse is able to place an eSignature**    Nurse 2 eSignature: Electronically signed by Lazaro Colón RN on 1/4/20 at 1:51 AM

## 2020-01-04 NOTE — PROGRESS NOTES
General  Chart Reviewed: Yes  Additional Pertinent Hx: Pt is a 69 y.o. female presenting to [facility] with 2 to 3 week h/o progressive cough and SOB. Pt admitted with COPD exacerbation. PMHx includes: fibromyalgia, DDD, depression, avril CTS, COPD, chronic pain syndrome  Family / Caregiver Present: No  Referring Practitioner: Kalie Ospina MD  Diagnosis: COPD exacerbation   Subjective  Subjective: Pt met b/s for OT eval/tx. Pt seated in recliner talking on telephone on arrival, agreeable to participate. Pt reports she has chronic back pain, but recently received pain medicine. Social/Functional History  Social/Functional History  Lives With: Spouse(significant other and grandaughter (25 y.o. and living with pt temporarily))  Type of Home: House  Home Layout: One level, Laundry in basement(11 LANNY to basement with L handrail)  Home Access: Level entry  Bathroom Shower/Tub: Tub/Shower unit, Curtain, Shower chair with back  Bathroom Toilet: Standard(sink nearby)  Bathroom Equipment: Shower chair, Grab bars in shower, Hand-held shower  Home Equipment: Cane, Reacher, Sock aid, Long-handled shoehorn  ADL Assistance: Independent  Homemaking Assistance: Independent(Pt does laundry, cleaning. Spouse does cooking and grocery shopping.)  Ambulation Assistance: Independent(with no AD)  Transfer Assistance: Independent  Active : No  Patient's  Info: spouse   Occupation: Retired  Additional Comments: Pt sleeps in regular bed. Objective   Vision: Impaired  Vision Exceptions: Wears glasses at all times; Cataracts  Hearing: Within functional limits      Orientation  Overall Orientation Status: Within Functional Limits     Balance  Sitting Balance: Independent  Standing Balance: Independent  Functional Mobility  Functional - Mobility Device: No device  Activity: Other  Assist Level:  Independent  Functional Mobility Comments: Pt completed fxl mobility in room and community distance in hallway with no

## 2020-01-05 VITALS
DIASTOLIC BLOOD PRESSURE: 74 MMHG | OXYGEN SATURATION: 92 % | HEIGHT: 64 IN | TEMPERATURE: 98.2 F | SYSTOLIC BLOOD PRESSURE: 148 MMHG | WEIGHT: 146.83 LBS | RESPIRATION RATE: 18 BRPM | BODY MASS INDEX: 25.07 KG/M2 | HEART RATE: 102 BPM

## 2020-01-05 PROCEDURE — 94761 N-INVAS EAR/PLS OXIMETRY MLT: CPT

## 2020-01-05 PROCEDURE — 96372 THER/PROPH/DIAG INJ SC/IM: CPT

## 2020-01-05 PROCEDURE — 6360000002 HC RX W HCPCS: Performed by: INTERNAL MEDICINE

## 2020-01-05 PROCEDURE — 6370000000 HC RX 637 (ALT 250 FOR IP): Performed by: INTERNAL MEDICINE

## 2020-01-05 PROCEDURE — 94640 AIRWAY INHALATION TREATMENT: CPT

## 2020-01-05 PROCEDURE — 6370000000 HC RX 637 (ALT 250 FOR IP): Performed by: NURSE PRACTITIONER

## 2020-01-05 PROCEDURE — 2580000003 HC RX 258: Performed by: INTERNAL MEDICINE

## 2020-01-05 PROCEDURE — 2700000000 HC OXYGEN THERAPY PER DAY

## 2020-01-05 PROCEDURE — 96376 TX/PRO/DX INJ SAME DRUG ADON: CPT

## 2020-01-05 RX ORDER — PREDNISONE 20 MG/1
40 TABLET ORAL DAILY
Qty: 8 TABLET | Refills: 0 | Status: SHIPPED | OUTPATIENT
Start: 2020-01-06 | End: 2020-01-10 | Stop reason: ALTCHOICE

## 2020-01-05 RX ORDER — AZITHROMYCIN 500 MG/1
500 TABLET, FILM COATED ORAL DAILY
Qty: 1 PACKET | Refills: 0 | Status: SHIPPED | OUTPATIENT
Start: 2020-01-05 | End: 2020-01-08

## 2020-01-05 RX ORDER — CEFUROXIME AXETIL 500 MG/1
500 TABLET ORAL 2 TIMES DAILY
Qty: 8 TABLET | Refills: 0 | Status: SHIPPED | OUTPATIENT
Start: 2020-01-05 | End: 2020-01-09

## 2020-01-05 RX ADMIN — IPRATROPIUM BROMIDE AND ALBUTEROL SULFATE 1 AMPULE: .5; 3 SOLUTION RESPIRATORY (INHALATION) at 08:08

## 2020-01-05 RX ADMIN — Medication 10 ML: at 05:32

## 2020-01-05 RX ADMIN — ATORVASTATIN CALCIUM 10 MG: 10 TABLET, FILM COATED ORAL at 09:04

## 2020-01-05 RX ADMIN — METHYLPREDNISOLONE SODIUM SUCCINATE 40 MG: 40 INJECTION, POWDER, FOR SOLUTION INTRAMUSCULAR; INTRAVENOUS at 05:32

## 2020-01-05 RX ADMIN — DULOXETINE HYDROCHLORIDE 30 MG: 30 CAPSULE, DELAYED RELEASE ORAL at 09:03

## 2020-01-05 RX ADMIN — MOMETASONE FUROATE AND FORMOTEROL FUMARATE DIHYDRATE 2 PUFF: 200; 5 AEROSOL RESPIRATORY (INHALATION) at 08:08

## 2020-01-05 RX ADMIN — METHYLPREDNISOLONE SODIUM SUCCINATE 40 MG: 40 INJECTION, POWDER, FOR SOLUTION INTRAMUSCULAR; INTRAVENOUS at 00:34

## 2020-01-05 RX ADMIN — OXYCODONE HYDROCHLORIDE AND ACETAMINOPHEN 1 TABLET: 7.5; 325 TABLET ORAL at 05:31

## 2020-01-05 RX ADMIN — IPRATROPIUM BROMIDE AND ALBUTEROL SULFATE 1 AMPULE: .5; 3 SOLUTION RESPIRATORY (INHALATION) at 12:00

## 2020-01-05 RX ADMIN — VITAMIN D, TAB 1000IU (100/BT) 2000 UNITS: 25 TAB at 09:03

## 2020-01-05 RX ADMIN — Medication 10 ML: at 00:34

## 2020-01-05 RX ADMIN — Medication 10 ML: at 09:04

## 2020-01-05 RX ADMIN — OXYCODONE HYDROCHLORIDE AND ACETAMINOPHEN 1 TABLET: 7.5; 325 TABLET ORAL at 12:34

## 2020-01-05 RX ADMIN — PANTOPRAZOLE SODIUM 40 MG: 40 TABLET, DELAYED RELEASE ORAL at 09:03

## 2020-01-05 RX ADMIN — ENOXAPARIN SODIUM 40 MG: 40 INJECTION SUBCUTANEOUS at 09:03

## 2020-01-05 RX ADMIN — METHYLPREDNISOLONE SODIUM SUCCINATE 40 MG: 40 INJECTION, POWDER, FOR SOLUTION INTRAMUSCULAR; INTRAVENOUS at 12:34

## 2020-01-05 RX ADMIN — GABAPENTIN 100 MG: 100 CAPSULE ORAL at 09:03

## 2020-01-05 ASSESSMENT — PAIN DESCRIPTION - ONSET
ONSET: ON-GOING

## 2020-01-05 ASSESSMENT — PAIN DESCRIPTION - PAIN TYPE
TYPE: CHRONIC PAIN

## 2020-01-05 ASSESSMENT — PAIN SCALES - GENERAL
PAINLEVEL_OUTOF10: 4
PAINLEVEL_OUTOF10: 7
PAINLEVEL_OUTOF10: 7
PAINLEVEL_OUTOF10: 4
PAINLEVEL_OUTOF10: 4

## 2020-01-05 ASSESSMENT — PAIN DESCRIPTION - DESCRIPTORS
DESCRIPTORS: ACHING

## 2020-01-05 ASSESSMENT — PAIN DESCRIPTION - ORIENTATION
ORIENTATION: LOWER

## 2020-01-05 ASSESSMENT — PAIN DESCRIPTION - FREQUENCY
FREQUENCY: CONTINUOUS

## 2020-01-05 ASSESSMENT — PAIN - FUNCTIONAL ASSESSMENT
PAIN_FUNCTIONAL_ASSESSMENT: ACTIVITIES ARE NOT PREVENTED

## 2020-01-05 ASSESSMENT — PAIN DESCRIPTION - PROGRESSION
CLINICAL_PROGRESSION: GRADUALLY IMPROVING

## 2020-01-05 ASSESSMENT — PAIN DESCRIPTION - LOCATION
LOCATION: BACK

## 2020-01-05 NOTE — PROGRESS NOTES
All verbal and written discharge instructions given to the pt at discharge regarding new meds and follow up appointment . Pt verbalized understandings. Pt waiting for her  for ride home. Electronically signed by Heather Purcell RN on 1/5/2020 at 2:32 PM

## 2020-01-05 NOTE — DISCHARGE INSTR - COC
Continuity of Care Form    Patient Name: Connie Hdz   :  1945  MRN:  [id number]    Admit date:  1/3/2020  Discharge date:  ***    Code Status Order: Full Code   Advance Directives:   Advance Care Flowsheet Documentation     Date/Time Healthcare Directive Type of Healthcare Directive Copy in [address] Agent's Name Healthcare Agent's Phone Number    20 18:44:27  No, patient does not have an advance directive for healthcare treatment -- -- -- -- --          Admitting Physician:  Magdiel Gee MD  PCP: HECTOR Knight - CNP    Discharging Nurse: [facility] Unit/Room#: M2Y-2460/9077-57  Discharging Unit Phone Number: ***    Emergency Contact:   Extended Emergency Contact Information  Primary Emergency Contact: [facility]  Address: [place] [address] [address]  Phone: [phone]  Mobile Phone: [phone]  Relation: Domestic Partner    Past Surgical History:  Past Surgical History:   Procedure Laterality Date    CERVICAL POLYP REMOVAL  2004    TYMPANOSTOMY TUBE PLACEMENT         Immunization History:   Immunization History   Administered Date(s) Administered    Influenza Vaccine, unspecified formulation 01/10/2017    Influenza, High Dose (Fluzone 65 yrs and older) 2013, 2016, 2017, 10/30/2018    Influenza, Triv, inactivated, subunit, adjuvanted, IM (Fluad 65 yrs and older) 2019    Pneumococcal Conjugate 13-valent ([id number]) 2015    Pneumococcal Conjugate Vaccine 01/10/2017    Pneumococcal Polysaccharide ([id number]) 10/12/2012    Tdap (Boostrix, Adacel) 2007       Active Problems:  Patient Active Problem List   Diagnosis Code    Chronic obstructive pulmonary disease ([facility] [address] J44.9    Osteopenia-last dexa  repeat  (-2.4 hip)--advised tx M85.80    Colon polyp, hyperplastic,-(done 3/11-repeat 3-5 yrs--dr ulloa) K63.5    CPBI:681129689}  Bathing  {CHP DME WDQZ:325550934}  Dressing  {CHP DME MIPF:995821977}  Toileting  {CHP DME AFCC:535521806}  Feeding  {CHP DME GDHY:919513117}  Med Admin  {CHP DME ROLL:369569710}  Med Delivery   { KENNY MED Delivery:212204418}    Wound Care Documentation and Therapy:        Elimination:  Continence:   · Bowel: {YES / YC:40432}  · Bladder: {YES / ZJ:33740}  Urinary Catheter: {Urinary Catheter:316032731}   Colostomy/Ileostomy/Ileal Conduit: {YES / LL:78185}       Date of Last BM: ***    Intake/Output Summary (Last 24 hours) at 2020 1109  Last data filed at 2020 0909  Gross per 24 hour   Intake 370 ml   Output --   Net 370 ml     I/O last 3 completed shifts:   In: 240 [P.O.:240]  Out: -     Safety Concerns:     [address] Safety Concerns:332547178}    Impairments/Disabilities:      [address] Impairments/Disabilities:880915155}    Nutrition Therapy:  Current Nutrition Therapy:   [address] Diet List:667309683}    Routes of Feeding: {[facility] DME Other Feedings:079464521}  Liquids: {Slp liquid thickness:15103}  Daily Fluid Restriction: {CHP DME Yes amt example:726095722}  Last Modified Barium Swallow with Video (Video Swallowing Test): {Done Not Done YVEW:220306330}    Treatments at the Time of Hospital Discharge:   Respiratory Treatments: ***  Oxygen Therapy:  {Therapy; copd oxygen:63986}  Ventilator:    { CC Vent ZFZK:045081702}    Rehab Therapies: {THERAPEUTIC INTERVENTION:0041496854}  Weight Bearing Status/Restrictions: [address] Weight Bearin}  Other Medical Equipment (for information only, NOT a DME order):  {EQUIPMENT:152346096}  Other Treatments: ***    Patient's personal belongings (please select all that are sent with patient):  {[facility] DME Belongings:191643113}    RN SIGNATURE:  {Esignature:090150631}    CASE MANAGEMENT/SOCIAL WORK SECTION    Inpatient Status Date: ***    Readmission Risk Assessment Score:  Readmission Risk              Risk of Unplanned Readmission:        11 Discharging to Facility/ Agency   · Name:   · Address:  · Phone:  · Fax:    Dialysis Facility (if applicable)   · Name:  · Address:  · Dialysis Schedule:  · Phone:  · Fax:    / signature: {Esignature:713534176}    PHYSICIAN SECTION    Prognosis: {Prognosis:1953827479}    Condition at Discharge: Gonzalez Noyola Patient Condition:012846020}    Rehab Potential (if transferring to Rehab): {Prognosis:5608778258}    Recommended Labs or Other Treatments After Discharge: ***    Physician Certification: I certify the above information and transfer of Moo Rao  is necessary for the continuing treatment of the diagnosis listed and that she requires {Admit to Appropriate Level of Care:83879} for {GREATER/LESS:963478357} 30 days.      Update Admission H&P: {CHP DME Changes in FRYZS:015498826}    PHYSICIAN SIGNATURE:  {Esignature:527232445}

## 2020-01-05 NOTE — PLAN OF CARE
Problem: Falls - Risk of:  Goal: Will remain free from falls  Description  Will remain free from falls  Outcome: Ongoing  Goal: Absence of physical injury  Description  Absence of physical injury  Outcome: Ongoing     Problem: Pain:  Goal: Pain level will decrease  Description  Pain level will decrease  Outcome: Ongoing  Goal: Control of acute pain  Description  Control of acute pain  Outcome: Ongoing  Goal: Control of chronic pain  Description  Control of chronic pain  Outcome: Ongoing     Problem: Discharge Planning:  Goal: Discharged to appropriate level of care  Description  Discharged to appropriate level of care  Outcome: Ongoing     Problem:  Activity Intolerance:  Goal: Ability to tolerate increased activity will improve  Description  Ability to tolerate increased activity will improve  Outcome: Ongoing     Problem: Airway Clearance - Ineffective:  Goal: Ability to maintain a clear airway will improve  Description  Ability to maintain a clear airway will improve  Outcome: Ongoing     Problem: Breathing Pattern - Ineffective:  Goal: Ability to achieve and maintain a regular respiratory rate will improve  Description  Ability to achieve and maintain a regular respiratory rate will improve  Outcome: Ongoing     Problem: Gas Exchange - Impaired:  Goal: Levels of oxygenation will improve  Description  Levels of oxygenation will improve  Outcome: Ongoing

## 2020-01-05 NOTE — DISCHARGE SUMMARY
Cardiovascular:  Regular rate and rhythm with normal S1/S2 without murmurs, rubs or gallops. Abdomen: Soft, non-tender  Musculoskeletal:  No clubbing  Skin: Skin color, texture, turgor normal.  No rashes or lesions. Neurologic:  No focal weakness   Psychiatric:  Alert and oriented  Capillary Refill: Brisk,< 3 seconds   Peripheral Pulses: +2 palpable, equal bilaterally       Labs: For convenience and continuity at follow-up the following most recent labs are provided:      CBC:    Lab Results   Component Value Date    WBC 14.5 01/04/2020    HGB 13.3 01/04/2020    HCT 41.1 01/04/2020     01/04/2020       Renal:    Lab Results   Component Value Date     01/04/2020    K 4.7 01/04/2020     01/04/2020    CO2 21 01/04/2020    BUN 16 01/04/2020    CREATININE 0.7 01/04/2020    CALCIUM 9.5 01/04/2020    PHOS 1.9 03/29/2019         Significant Diagnostic Studies    Radiology:   XR CHEST STANDARD (2 VW)   Final Result   No acute cardiopulmonary process. Consults:     None    Disposition:  home     Condition at Discharge: Stable    Discharge Instructions/Follow-up:  PCP, Pulmonary    Code Status:  Full Code     Activity: activity as tolerated    Diet: regular diet      Discharge Medications:     Current Discharge Medication List           Details   predniSONE (DELTASONE) 20 MG tablet Take 2 tablets by mouth daily for 4 days  Qty: 8 tablet, Refills: 0      azithromycin (ZITHROMAX) 500 MG tablet Take 1 tablet by mouth daily for 3 days  Qty: 1 packet, Refills: 0      cefUROXime (CEFTIN) 500 MG tablet Take 1 tablet by mouth 2 times daily for 4 days  Qty: 8 tablet, Refills: 0              Details   oxyCODONE-acetaminophen (PERCOCET) 7.5-325 MG per tablet Take 1 tablet by mouth every 6 hours as needed for Pain for up to 30 days.  Max 4 a day  Qty: 120 tablet, Refills: 0    Comments: Reduce doses taken as pain becomes manageable  Associated Diagnoses: Chronic pain syndrome; Fibromyalgia; DDD Fluticasone Furoate-Vilanterol (BREO ELLIPTA) 200-25 MCG/INH AEPB INHALE 1 PUFF INTO THE LUNGS DAILY  Qty: 1 each, Refills: 5    Associated Diagnoses: COPD, severe (HCC)      ipratropium-albuterol (DUONEB) 0.5-2.5 (3) MG/3ML SOLN nebulizer solution Take 1 aerosol every 4 hours for 2 days and then as needed for shortness of breath coughing and wheezing  Qty: 360 mL, Refills: 3      Cholecalciferol (VITAMIN D) 2000 units TABS tablet Take 1 tablet by mouth daily  Qty: 30 tablet      cetirizine (ZYRTEC) 10 MG tablet Take 10 mg by mouth daily as needed for Allergies      albuterol sulfate  (90 Base) MCG/ACT inhaler Inhale 2 puffs into the lungs every 6 hours as needed for Shortness of Breath  Qty: 1 Inhaler, Refills: 5    Associated Diagnoses: COPD, severe (HCC)      Nebulizers (COMPRESSOR/NEBULIZER) MISC 1 Units by Does not apply route 4 times daily  Qty: 1 each, Refills: 3             Time Spent on discharge is more than 1 hour in the examination, evaluation, counseling and review of medications and discharge plan. Signed:    Madai Chung MD   1/5/2020      Thank you HECTOR Hannah CNP for the opportunity to be involved in this patient's care. If you have any questions or concerns please feel free to contact me at [phone].

## 2020-01-05 NOTE — PLAN OF CARE
Problem: Falls - Risk of:  Goal: Will remain free from falls  Description  Will remain free from falls  Outcome: Ongoing  Note:   Pt free from falls this shift. Non skid socks on. X 2 bedside rails used. Pt UAL. Call light always within reach. Pt able and agreeable to contact for safety appropriately. Problem: Pain:  Goal: Pain level will decrease  Description  Pain level will decrease  Outcome: Ongoing  Note:   Pt able to express presence and absence of pain using numerical pain scale. Pt pain is managed by PRN analgesics as ordered by MD. Pain reassess after each interventions. Will continue to monitor as needed. Problem: Discharge Planning:  Goal: Discharged to appropriate level of care  Description  Discharged to appropriate level of care  Outcome: Ongoing     Problem: Activity Intolerance:  Goal: Ability to tolerate increased activity will improve  Description  Ability to tolerate increased activity will improve  Outcome: Ongoing     Problem: Airway Clearance - Ineffective:  Goal: Ability to maintain a clear airway will improve  Description  Ability to maintain a clear airway will improve  Outcome: Ongoing     Problem: Breathing Pattern - Ineffective:  Goal: Ability to achieve and maintain a regular respiratory rate will improve  Description  Ability to achieve and maintain a regular respiratory rate will improve  Outcome: Ongoing     Problem: Gas Exchange - Impaired:  Goal: Levels of oxygenation will improve  Description  Levels of oxygenation will improve  Outcome: Ongoing  Note:   Pt currently on 2l nc, maintains 02 sat 91%.

## 2020-01-06 ENCOUNTER — TELEPHONE (OUTPATIENT)
Dept: FAMILY MEDICINE CLINIC | Age: 75
End: 2020-01-06

## 2020-01-06 LAB
CULTURE, RESPIRATORY: NORMAL
GRAM STAIN RESULT: NORMAL

## 2020-01-07 ENCOUNTER — SCHEDULED TELEPHONE ENCOUNTER (OUTPATIENT)
Dept: PHARMACY | Age: 75
End: 2020-01-07

## 2020-01-07 LAB
BLOOD CULTURE, ROUTINE: NORMAL
CULTURE, BLOOD 2: NORMAL

## 2020-01-07 NOTE — TELEPHONE ENCOUNTER
[facility]  COPD Service  [phone]      Follow up phone call:    NAME: Kaleigh Earl RECORD NUMBER:  [id number] was referred to our [address]  by Dr. Lorelei Rasheed. I called and spoke  Connie Hdz and she wanted to wait to schedule an appointment until after she sees her PCP on 1/10. Are you having any issues that need immediate attention? No    Are you out of any of your medications? No    Do you have any questions or concerns that I can help you with today? No    Please call any time with questions or concerns, especially with worsening symptoms. Do not wait to call. PLAN:   We will call her again next week to schedule. Appropriate staff has been notified with regards to any concerns noted above     [facility]  COPD Service  Phone: [phone]  Fax [phone]    We also have outpatient services in Heart Failure, Diabetes, Anticoagulation and Infusion.

## 2020-01-10 ENCOUNTER — OFFICE VISIT (OUTPATIENT)
Dept: FAMILY MEDICINE CLINIC | Age: 75
End: 2020-01-10
Payer: MEDICARE

## 2020-01-10 ENCOUNTER — OFFICE VISIT (OUTPATIENT)
Dept: PULMONOLOGY | Age: 75
End: 2020-01-10
Payer: MEDICARE

## 2020-01-10 VITALS
OXYGEN SATURATION: 98 % | HEART RATE: 73 BPM | RESPIRATION RATE: 16 BRPM | WEIGHT: 150 LBS | HEIGHT: 64 IN | DIASTOLIC BLOOD PRESSURE: 78 MMHG | SYSTOLIC BLOOD PRESSURE: 120 MMHG | BODY MASS INDEX: 25.61 KG/M2

## 2020-01-10 VITALS
SYSTOLIC BLOOD PRESSURE: 130 MMHG | HEART RATE: 80 BPM | BODY MASS INDEX: 25.75 KG/M2 | DIASTOLIC BLOOD PRESSURE: 80 MMHG | TEMPERATURE: 98.1 F | OXYGEN SATURATION: 95 % | RESPIRATION RATE: 12 BRPM | WEIGHT: 150 LBS

## 2020-01-10 PROCEDURE — 1123F ACP DISCUSS/DSCN MKR DOCD: CPT | Performed by: INTERNAL MEDICINE

## 2020-01-10 PROCEDURE — 99213 OFFICE O/P EST LOW 20 MIN: CPT | Performed by: INTERNAL MEDICINE

## 2020-01-10 PROCEDURE — 1090F PRES/ABSN URINE INCON ASSESS: CPT | Performed by: INTERNAL MEDICINE

## 2020-01-10 PROCEDURE — G8417 CALC BMI ABV UP PARAM F/U: HCPCS | Performed by: NURSE PRACTITIONER

## 2020-01-10 PROCEDURE — 3017F COLORECTAL CA SCREEN DOC REV: CPT | Performed by: NURSE PRACTITIONER

## 2020-01-10 PROCEDURE — G8417 CALC BMI ABV UP PARAM F/U: HCPCS | Performed by: INTERNAL MEDICINE

## 2020-01-10 PROCEDURE — G8926 SPIRO NO PERF OR DOC: HCPCS | Performed by: NURSE PRACTITIONER

## 2020-01-10 PROCEDURE — 1090F PRES/ABSN URINE INCON ASSESS: CPT | Performed by: NURSE PRACTITIONER

## 2020-01-10 PROCEDURE — 4040F PNEUMOC VAC/ADMIN/RCVD: CPT | Performed by: NURSE PRACTITIONER

## 2020-01-10 PROCEDURE — 1123F ACP DISCUSS/DSCN MKR DOCD: CPT | Performed by: NURSE PRACTITIONER

## 2020-01-10 PROCEDURE — G8399 PT W/DXA RESULTS DOCUMENT: HCPCS | Performed by: NURSE PRACTITIONER

## 2020-01-10 PROCEDURE — 3017F COLORECTAL CA SCREEN DOC REV: CPT | Performed by: INTERNAL MEDICINE

## 2020-01-10 PROCEDURE — 1036F TOBACCO NON-USER: CPT | Performed by: INTERNAL MEDICINE

## 2020-01-10 PROCEDURE — G8427 DOCREV CUR MEDS BY ELIG CLIN: HCPCS | Performed by: INTERNAL MEDICINE

## 2020-01-10 PROCEDURE — 1111F DSCHRG MED/CURRENT MED MERGE: CPT | Performed by: NURSE PRACTITIONER

## 2020-01-10 PROCEDURE — G8399 PT W/DXA RESULTS DOCUMENT: HCPCS | Performed by: INTERNAL MEDICINE

## 2020-01-10 PROCEDURE — 3023F SPIROM DOC REV: CPT | Performed by: INTERNAL MEDICINE

## 2020-01-10 PROCEDURE — G8482 FLU IMMUNIZE ORDER/ADMIN: HCPCS | Performed by: NURSE PRACTITIONER

## 2020-01-10 PROCEDURE — G8926 SPIRO NO PERF OR DOC: HCPCS | Performed by: INTERNAL MEDICINE

## 2020-01-10 PROCEDURE — 1036F TOBACCO NON-USER: CPT | Performed by: NURSE PRACTITIONER

## 2020-01-10 PROCEDURE — 4040F PNEUMOC VAC/ADMIN/RCVD: CPT | Performed by: INTERNAL MEDICINE

## 2020-01-10 PROCEDURE — G8427 DOCREV CUR MEDS BY ELIG CLIN: HCPCS | Performed by: NURSE PRACTITIONER

## 2020-01-10 PROCEDURE — 1111F DSCHRG MED/CURRENT MED MERGE: CPT | Performed by: INTERNAL MEDICINE

## 2020-01-10 PROCEDURE — 3023F SPIROM DOC REV: CPT | Performed by: NURSE PRACTITIONER

## 2020-01-10 PROCEDURE — 99213 OFFICE O/P EST LOW 20 MIN: CPT | Performed by: NURSE PRACTITIONER

## 2020-01-10 PROCEDURE — G8482 FLU IMMUNIZE ORDER/ADMIN: HCPCS | Performed by: INTERNAL MEDICINE

## 2020-01-10 RX ORDER — ALBUTEROL SULFATE 90 UG/1
2 AEROSOL, METERED RESPIRATORY (INHALATION) EVERY 6 HOURS PRN
Qty: 1 INHALER | Refills: 2 | Status: SHIPPED | OUTPATIENT
Start: 2020-01-10 | End: 2021-01-18 | Stop reason: SDUPTHER

## 2020-01-10 RX ORDER — PREDNISONE 10 MG/1
TABLET ORAL
Qty: 30 TABLET | Refills: 0 | Status: SHIPPED | OUTPATIENT
Start: 2020-01-10 | End: 2021-09-27 | Stop reason: SDUPTHER

## 2020-01-10 RX ORDER — PREDNISONE 20 MG/1
20 TABLET ORAL DAILY
Qty: 10 TABLET | Refills: 0 | Status: SHIPPED | OUTPATIENT
Start: 2020-01-10 | End: 2020-01-15

## 2020-01-10 ASSESSMENT — ENCOUNTER SYMPTOMS
SHORTNESS OF BREATH: 1
COUGH: 1
WHEEZING: 1

## 2020-01-10 NOTE — PROGRESS NOTES
breastfeeding.'  Gen: No distress. ENT:   Resp: No accessory muscle use. No crackles. + wheezes. No rhonchi. CV: Regular rate. Regular rhythm. No murmur or rub. No edema. Skin: Warm, dry, normal texture and turgor. No nodule on exposed extremities. M/S: No cyanosis. No clubbing. No joint deformity. Psych: Oriented x 3. No anxiety. Awake. Alert. Intact judgement and insight. Good Mood / Affect. Memory appears in tact       Current Outpatient Medications   Medication Sig Dispense Refill    albuterol sulfate  (90 Base) MCG/ACT inhaler Inhale 2 puffs into the lungs every 6 hours as needed for Shortness of Breath 1 Inhaler 2    predniSONE (DELTASONE) 20 MG tablet Take 1 tablet by mouth daily for 5 days 10 tablet 0    predniSONE (DELTASONE) 10 MG tablet Take 40 mg by mouth for 3 days 30 mg for 3 days 20 mg for 3 days 10 mg for 3 days. 30 tablet 0    pantoprazole (PROTONIX) 40 MG tablet Take 1 tablet by mouth daily 30 tablet 1    gabapentin (NEURONTIN) 600 MG tablet Take 1 tablet by mouth 3 times daily for 30 days.  90 tablet 1    QUEtiapine (SEROQUEL) 25 MG tablet Take 1-2 tablets by mouth nightly (Patient taking differently: Take 50 mg by mouth nightly ) 60 tablet 0    DULoxetine (CYMBALTA) 60 MG extended release capsule Take one tablet at night 30 capsule 1    tiZANidine (ZANAFLEX) 4 MG tablet Take one tablet po BID 60 tablet 1    meloxicam (MOBIC) 15 MG tablet TAKE 1 TABLET BY MOUTH DAILY 30 tablet 1    atorvastatin (LIPITOR) 80 MG tablet TAKE 1 TABLET BY MOUTH EVERY DAY FOR CHOLESTEROL 90 tablet 1    Fluticasone Furoate-Vilanterol (BREO ELLIPTA) 200-25 MCG/INH AEPB INHALE 1 PUFF INTO THE LUNGS DAILY 1 each 5    ipratropium-albuterol (DUONEB) 0.5-2.5 (3) MG/3ML SOLN nebulizer solution Take 1 aerosol every 4 hours for 2 days and then as needed for shortness of breath coughing and wheezing 360 mL 3    Cholecalciferol (VITAMIN D) 2000 units TABS tablet Take 1 tablet by mouth daily 30 tablet  cetirizine (ZYRTEC) 10 MG tablet Take 10 mg by mouth daily as needed for Allergies      Nebulizers (COMPRESSOR/NEBULIZER) MISC 1 Units by Does not apply route 4 times daily 1 each 3     No current facility-administered medications for this visit. Data Reviewed:   CBC and Renal reviewed  Last CBC  Lab Results   Component Value Date    WBC 14.5 01/04/2020    RBC 4.28 01/04/2020    HGB 13.3 01/04/2020    MCV 96.0 01/04/2020     01/04/2020     Last Renal  Lab Results   Component Value Date     01/04/2020    K 4.7 01/04/2020     01/04/2020    CO2 21 01/04/2020    CO2 27 01/03/2020    CO2 23 09/04/2019    BUN 16 01/04/2020    CREATININE 0.7 01/04/2020    GLUCOSE 177 01/04/2020    CALCIUM 9.5 01/04/2020       Last ABG  POC Blood Gas: No results found for: POCPH, POCPCO2, POCPO2, POCHCO3, NBEA, [id number]  No results for input(s): PH, PCO2, PO2, HCO3, BE, O2SAT in the last 72 hours. Assessment:     COPD, Moderately severe  Tobacco abuse. Plan:        Problem List Items Addressed This Visit     Tobacco abuse-advised to quit--lung cancer screening neg 5/18--repeat low dose ct 1 yr     Quit smoking. Last lung cancer screening 9/'19. COPD, severe ([facility] [address] - Primary     Having some continued symptoms with exacerbation  Will give 5 more days of prednisone. No further antibiotics. Continue Breo and neb. We discussed increasing to Trelegy but she is concerned for cost.  No change in treatment. Relevant Medications    predniSONE (DELTASONE) 20 MG tablet    predniSONE (DELTASONE) 10 MG tablet    COPD exacerbation (HCC)    Relevant Medications    predniSONE (DELTASONE) 20 MG tablet    predniSONE (DELTASONE) 10 MG tablet        This note was transcribed using [address]. Please disregard any translational errors.

## 2020-01-10 NOTE — ASSESSMENT & PLAN NOTE
Having some continued symptoms with exacerbation  Will give 5 more days of prednisone. No further antibiotics. Continue Breo and neb. We discussed increasing to Trelegy but she is concerned for cost.  No change in treatment.

## 2020-01-10 NOTE — PROGRESS NOTES
1/10/2020    This is a 69 y.o. female   Chief Complaint   Patient presents with    Follow-Up from [address] [place] [address] 1/3-1/5 Acute and chronic respiratory failure with hypoxia     Nicotine Dependence     quit on 1/1, using patch   . HPI  Patient is here for hospital follow up for COPD exacerbation. Patient reports that she is breathing much better. Finished prednisone yesterday. Weaned off O2 in hospital.   Does not want home health. Using the nebulizer BID and albuterol inhaler BID. Continues breo inhaler. She is now wearing the nicotine patch.       Patient Active Problem List   Diagnosis    Chronic obstructive pulmonary disease ([address]    Osteopenia-last dexa 2009 repeat 2015 (-2.4 hip)--advised tx    Colon polyp, hyperplastic,-(done 3/11-repeat 3-5 yrs--dr Nereida London)    Family history of colon cancer    CTS (carpal tunnel syndrome)BILATERAL-s/p surgical repair--resolved     Postmenopausal status-last mammogram 2/15 wnl last pap 12/13--wnl    Nondependent alcohol abuse, in remission    Urticaria, chronic    Tobacco abuse-advised to quit--lung cancer screening neg 5/18--repeat low dose ct 1 yr    Chronic back pain-(djd) saw dr Amina Andrews afford surgery--seeing dr Gunnar Avalos for pain meds    Degeneration of lumbar or lumbosacral intervertebral disc    Fibromyalgia    Hypercholesteremia    Lumbar spondylosis    Vitamin D deficiency--severe--started 50,000 iu 2x/ wk 11/13    Insomnia--on elevil w help    Constipation--on daily miralax    Prediabetes--a1c was 6.3 1/16--diet advised    Depression    Chronic pain syndrome    Muscle cramping    Acute and chronic respiratory failure with hypoxia ([address]    ROLY (acute kidney injury) ([address]    Gastroesophageal reflux disease    COPD, severe ([address]    Chronic hypoxemic respiratory failure ([address]    Degeneration of lumbar or lumbosacral intervertebral disc    Trochanteric bursitis of right hip    COPD exacerbation ([address]          Current Sitting   Cuff Size: Medium Adult   Pulse: 80   Resp: 12   Temp: 98.1 °F (36.7 °C)   SpO2: 95%   Weight: 150 lb (68 kg)       Body mass index is 25.75 kg/m². Wt Readings from Last 3 Encounters:   01/10/20 150 lb (68 kg)   01/05/20 146 lb 13.2 oz (66.6 kg)   01/03/20 149 lb (67.6 kg)       BP Readings from Last 3 Encounters:   01/10/20 130/80   01/05/20 (!) 148/74   01/03/20 114/72       Physical Exam  Vitals signs and nursing note reviewed. Constitutional:       General: She is not in acute distress. Appearance: She is well-developed. HENT:      Head: Normocephalic and atraumatic. Neck:      Musculoskeletal: Neck supple. Cardiovascular:      Rate and Rhythm: Normal rate and regular rhythm. Heart sounds: Normal heart sounds. No murmur. No friction rub. No gallop. Pulmonary:      Effort: Pulmonary effort is normal. No respiratory distress. Breath sounds: Wheezing present. Comments: Faint wheezing   Skin:     General: Skin is warm and dry. Neurological:      Mental Status: She is alert and oriented to person, place, and time. Psychiatric:         Behavior: Behavior normal.         Thought Content: Thought content normal.         Judgment: Judgment normal.         Assessmentand Plan  Neymar Pompa was seen today for follow-up from hospital.    Diagnoses and all orders for this visit:    COPD, severe ([address]  -     albuterol sulfate  (90 Base) MCG/ACT inhaler; Inhale 2 puffs into the lungs every 6 hours as needed for Shortness of Breath  Symptoms are improving. Finished prednisone yesterday. Advised to continue nebulizer or HFA inhaler every 4-6 hours PRN. Advised to f/u with Dr. Jenifer Magana. Currently on breo inhaler daily. Discussed that she may need to start trelegy and can discuss with Dr. Jenifer Magana. Continue with smoking cessation. Patient is to let me know if symptoms worsen or fail to continue to improve.      Return in about 4 weeks (around 2/7/2020), or if symptoms worsen or

## 2020-01-14 ENCOUNTER — SCHEDULED TELEPHONE ENCOUNTER (OUTPATIENT)
Dept: PHARMACY | Age: 75
End: 2020-01-14

## 2020-01-14 ENCOUNTER — OFFICE VISIT (OUTPATIENT)
Dept: PAIN MANAGEMENT | Age: 75
End: 2020-01-14
Payer: MEDICARE

## 2020-01-14 VITALS
DIASTOLIC BLOOD PRESSURE: 70 MMHG | SYSTOLIC BLOOD PRESSURE: 123 MMHG | HEART RATE: 78 BPM | WEIGHT: 150 LBS | BODY MASS INDEX: 25.75 KG/M2

## 2020-01-14 PROCEDURE — 3017F COLORECTAL CA SCREEN DOC REV: CPT | Performed by: INTERNAL MEDICINE

## 2020-01-14 PROCEDURE — G8399 PT W/DXA RESULTS DOCUMENT: HCPCS | Performed by: INTERNAL MEDICINE

## 2020-01-14 PROCEDURE — G8417 CALC BMI ABV UP PARAM F/U: HCPCS | Performed by: INTERNAL MEDICINE

## 2020-01-14 PROCEDURE — 4040F PNEUMOC VAC/ADMIN/RCVD: CPT | Performed by: INTERNAL MEDICINE

## 2020-01-14 PROCEDURE — G8427 DOCREV CUR MEDS BY ELIG CLIN: HCPCS | Performed by: INTERNAL MEDICINE

## 2020-01-14 PROCEDURE — 1111F DSCHRG MED/CURRENT MED MERGE: CPT | Performed by: INTERNAL MEDICINE

## 2020-01-14 PROCEDURE — 1036F TOBACCO NON-USER: CPT | Performed by: INTERNAL MEDICINE

## 2020-01-14 PROCEDURE — 99213 OFFICE O/P EST LOW 20 MIN: CPT | Performed by: INTERNAL MEDICINE

## 2020-01-14 PROCEDURE — 1090F PRES/ABSN URINE INCON ASSESS: CPT | Performed by: INTERNAL MEDICINE

## 2020-01-14 PROCEDURE — G8482 FLU IMMUNIZE ORDER/ADMIN: HCPCS | Performed by: INTERNAL MEDICINE

## 2020-01-14 PROCEDURE — 1123F ACP DISCUSS/DSCN MKR DOCD: CPT | Performed by: INTERNAL MEDICINE

## 2020-01-14 RX ORDER — QUETIAPINE FUMARATE 25 MG/1
25-50 TABLET, FILM COATED ORAL NIGHTLY
Qty: 60 TABLET | Refills: 0 | Status: SHIPPED | OUTPATIENT
Start: 2020-01-14 | End: 2020-02-11 | Stop reason: SDUPTHER

## 2020-01-14 RX ORDER — OXYCODONE AND ACETAMINOPHEN 7.5; 325 MG/1; MG/1
1 TABLET ORAL EVERY 6 HOURS PRN
Qty: 112 TABLET | Refills: 0 | Status: SHIPPED | OUTPATIENT
Start: 2020-01-14 | End: 2020-02-11 | Stop reason: SDUPTHER

## 2020-01-14 RX ORDER — DULOXETIN HYDROCHLORIDE 60 MG/1
CAPSULE, DELAYED RELEASE ORAL
Qty: 30 CAPSULE | Refills: 1 | Status: SHIPPED | OUTPATIENT
Start: 2020-01-14 | End: 2020-03-10 | Stop reason: SDUPTHER

## 2020-01-14 RX ORDER — PANTOPRAZOLE SODIUM 40 MG/1
40 TABLET, DELAYED RELEASE ORAL DAILY
Qty: 30 TABLET | Refills: 1 | Status: SHIPPED | OUTPATIENT
Start: 2020-01-14 | End: 2020-03-10 | Stop reason: SDUPTHER

## 2020-01-14 RX ORDER — MELOXICAM 15 MG/1
TABLET ORAL
Qty: 30 TABLET | Refills: 1 | Status: SHIPPED | OUTPATIENT
Start: 2020-01-14 | End: 2020-03-10 | Stop reason: SDUPTHER

## 2020-01-14 RX ORDER — TIZANIDINE 4 MG/1
TABLET ORAL
Qty: 60 TABLET | Refills: 1 | Status: SHIPPED | OUTPATIENT
Start: 2020-01-14 | End: 2020-03-10 | Stop reason: SDUPTHER

## 2020-01-14 RX ORDER — GABAPENTIN 600 MG/1
600 TABLET ORAL 3 TIMES DAILY
Qty: 90 TABLET | Refills: 1 | Status: SHIPPED | OUTPATIENT
Start: 2020-01-14 | End: 2020-03-10 | Stop reason: SDUPTHER

## 2020-01-14 NOTE — PROGRESS NOTES
Arvin Led  1945  <[id number]>    HISTORY OF PRESENT ILLNESS:  Ms. Gigi Aguirre is a 69 y.o. female returns for a follow up visit for multiple medical problems. Her current presenting problems are   1. Chronic pain syndrome    2. Osteoarthritis of spine with radiculopathy, lumbar region    3. Degeneration of lumbar or lumbosacral intervertebral disc    4. Fibromyalgia    5. Lumbar spondylosis    6. Trochanteric bursitis of right hip    7. Primary insomnia    8. Gastroesophageal reflux disease without esophagitis    9. Drug-induced constipation    10. Moderate episode of recurrent major depressive disorder ([facility] [address]    11. Muscle cramping    . As per information/history obtained from the PADT(patient assessment and documentation tool) - She complains of pain in the lower back with radiation to the buttocks She rates the pain 5/10 and describes it as aching. Pain is made worse by: standing. Current treatment regimen has helped relieve about 50% of the pain. She denies side effects from the current pain regimen. Patient reports that since the last follow up visit the physical functioning is unchanged, family/social relationships are unchanged, mood is unchanged and sleep patterns are unchanged, and that the overall functioning is unchanged. Patient denies neurological bowel or bladder. Patient denies misusing/abusing her narcotic pain medications or using any illegal drugs. There are No indicators for possible drug abuse, addiction or diversion problems. Upon obtaining the medical history from Ms. Gigi Aguirre regarding today's office visit for her presenting problems, Ms. Gigi Aguirre states she was in the hospital for COPD issues, she quit smoking 2 weeks ago, she is using the Nicotine patch. She mentions she is using Percocet 3-4 per day. Patient has been compliant with her regimen. ALLERGIES: Patients list of allergies were reviewed     MEDICATIONS: Ms. Gigi Aguirre list of medications were reviewed. Her current medications are   Outpatient Medications Prior to Visit   Medication Sig Dispense Refill    albuterol sulfate  (90 Base) MCG/ACT inhaler Inhale 2 puffs into the lungs every 6 hours as needed for Shortness of Breath 1 Inhaler 2    predniSONE (DELTASONE) 20 MG tablet Take 1 tablet by mouth daily for 5 days 10 tablet 0    predniSONE (DELTASONE) 10 MG tablet Take 40 mg by mouth for 3 days 30 mg for 3 days 20 mg for 3 days 10 mg for 3 days. 30 tablet 0    pantoprazole (PROTONIX) 40 MG tablet Take 1 tablet by mouth daily 30 tablet 1    QUEtiapine (SEROQUEL) 25 MG tablet Take 1-2 tablets by mouth nightly (Patient taking differently: Take 50 mg by mouth nightly ) 60 tablet 0    DULoxetine (CYMBALTA) 60 MG extended release capsule Take one tablet at night 30 capsule 1    tiZANidine (ZANAFLEX) 4 MG tablet Take one tablet po BID 60 tablet 1    meloxicam (MOBIC) 15 MG tablet TAKE 1 TABLET BY MOUTH DAILY 30 tablet 1    atorvastatin (LIPITOR) 80 MG tablet TAKE 1 TABLET BY MOUTH EVERY DAY FOR CHOLESTEROL 90 tablet 1    Fluticasone Furoate-Vilanterol (BREO ELLIPTA) 200-25 MCG/INH AEPB INHALE 1 PUFF INTO THE LUNGS DAILY 1 each 5    ipratropium-albuterol (DUONEB) 0.5-2.5 (3) MG/3ML SOLN nebulizer solution Take 1 aerosol every 4 hours for 2 days and then as needed for shortness of breath coughing and wheezing 360 mL 3    Cholecalciferol (VITAMIN D) 2000 units TABS tablet Take 1 tablet by mouth daily 30 tablet     cetirizine (ZYRTEC) 10 MG tablet Take 10 mg by mouth daily as needed for Allergies      Nebulizers (COMPRESSOR/NEBULIZER) MISC 1 Units by Does not apply route 4 times daily 1 each 3    gabapentin (NEURONTIN) 600 MG tablet Take 1 tablet by mouth 3 times daily for 30 days. 90 tablet 1     No facility-administered medications prior to visit. SOCIAL/FAMILY/PAST MEDICAL HISTORY: Ms. Niya Rivera, family and past medical history was reviewed.      REVIEW OF SYSTEMS:    Respiratory: Negative for apnea, [facility]), and Muscle cramping were pertinent to this visit. Risks and benefits of the medications and other alternative treatments  including no treatment were discussed with the patient. The common side effects of these medications were also explained to the patient. Informed verbal consent was obtained. Goals of current treatment regimen include improvement in pain, restoration of functioning- with focus on improvement in physical performance, general activity, work or disability,emotional distress, health care utilization and  decreased medication consumption. Will continue to monitor progress towards achieving/maintaining therapeutic goals with special emphasis on  1. Improvement in perceived interfernce  of pain with ADL's. Ability to do home exercises independently. Ability to do household chores indoor and/or outdoor work and social and leisure activities. Improve psychosocial and physical functioning. - she is showing progression towards this treatment goal with the current regimen. She was advised against drinking alcohol with the narcotic pain medicines, advised against driving or handling machinery while adjusting the dose of medicines or if having cognitive  issues related to the current medications. Risk of overdose and death, if medicines not taken as prescribed, were also discussed. If the patient develops new symptoms or if the symptoms worsen, the patient should call the office. While transcribing every attempt was made to maintain the accuracy of the note in terms of it's contents,there may have been some errors made inadvertently. Thank you for allowing me to participate in the care of this patient. Lyssa Medellin MD.    Cc: HECTOR Foster - FRED    I, Shira Collier, scribing for in the presence  of Dr. Lyssa Medellin.   01/14/20  12:08 PM  Erica Joe.  Ashley Stevens, Dr. Lyssa Medellin, personally performed the services described in this documentation as scribed by  Anselmo Lin MA in my presence and it is both accurate and complete

## 2020-01-31 ENCOUNTER — OFFICE VISIT (OUTPATIENT)
Dept: FAMILY MEDICINE CLINIC | Age: 75
End: 2020-01-31
Payer: MEDICARE

## 2020-01-31 VITALS
BODY MASS INDEX: 26.26 KG/M2 | HEART RATE: 74 BPM | OXYGEN SATURATION: 97 % | SYSTOLIC BLOOD PRESSURE: 100 MMHG | WEIGHT: 153 LBS | DIASTOLIC BLOOD PRESSURE: 70 MMHG

## 2020-01-31 DIAGNOSIS — E78.00 HYPERCHOLESTEREMIA: ICD-10-CM

## 2020-01-31 DIAGNOSIS — R73.03 PREDIABETES: ICD-10-CM

## 2020-01-31 DIAGNOSIS — J44.9 COPD, SEVERE (HCC): ICD-10-CM

## 2020-01-31 LAB
A/G RATIO: 2.1 (ref 1.1–2.2)
ALBUMIN SERPL-MCNC: 4.1 G/DL (ref 3.4–5)
ALP BLD-CCNC: 72 U/L (ref 40–129)
ALT SERPL-CCNC: 15 U/L (ref 10–40)
ANION GAP SERPL CALCULATED.3IONS-SCNC: 12 MMOL/L (ref 3–16)
AST SERPL-CCNC: 14 U/L (ref 15–37)
BASOPHILS ABSOLUTE: 0 K/UL (ref 0–0.2)
BASOPHILS RELATIVE PERCENT: 1 %
BILIRUB SERPL-MCNC: 0.5 MG/DL (ref 0–1)
BUN BLDV-MCNC: 17 MG/DL (ref 7–20)
CALCIUM SERPL-MCNC: 9.2 MG/DL (ref 8.3–10.6)
CHLORIDE BLD-SCNC: 106 MMOL/L (ref 99–110)
CO2: 26 MMOL/L (ref 21–32)
CREAT SERPL-MCNC: 1 MG/DL (ref 0.6–1.2)
EOSINOPHILS ABSOLUTE: 0.2 K/UL (ref 0–0.6)
EOSINOPHILS RELATIVE PERCENT: 4.8 %
ESTIMATED AVERAGE GLUCOSE: 142.7 MG/DL
GFR AFRICAN AMERICAN: >60
GFR NON-AFRICAN AMERICAN: 54
GLOBULIN: 2 G/DL
GLUCOSE BLD-MCNC: 52 MG/DL (ref 70–99)
HBA1C MFR BLD: 6.6 %
HCT VFR BLD CALC: 38.3 % (ref 36–48)
HEMOGLOBIN: 12.8 G/DL (ref 12–16)
LYMPHOCYTES ABSOLUTE: 1.4 K/UL (ref 1–5.1)
LYMPHOCYTES RELATIVE PERCENT: 31.6 %
MCH RBC QN AUTO: 31.9 PG (ref 26–34)
MCHC RBC AUTO-ENTMCNC: 33.4 G/DL (ref 31–36)
MCV RBC AUTO: 95.6 FL (ref 80–100)
MONOCYTES ABSOLUTE: 0.4 K/UL (ref 0–1.3)
MONOCYTES RELATIVE PERCENT: 9.3 %
NEUTROPHILS ABSOLUTE: 2.4 K/UL (ref 1.7–7.7)
NEUTROPHILS RELATIVE PERCENT: 53.3 %
PDW BLD-RTO: 15.4 % (ref 12.4–15.4)
PLATELET # BLD: 256 K/UL (ref 135–450)
PMV BLD AUTO: 7.1 FL (ref 5–10.5)
POTASSIUM SERPL-SCNC: 4.1 MMOL/L (ref 3.5–5.1)
RBC # BLD: 4.01 M/UL (ref 4–5.2)
SODIUM BLD-SCNC: 144 MMOL/L (ref 136–145)
TOTAL PROTEIN: 6.1 G/DL (ref 6.4–8.2)
WBC # BLD: 4.5 K/UL (ref 4–11)

## 2020-01-31 PROCEDURE — G8482 FLU IMMUNIZE ORDER/ADMIN: HCPCS | Performed by: NURSE PRACTITIONER

## 2020-01-31 PROCEDURE — 1036F TOBACCO NON-USER: CPT | Performed by: NURSE PRACTITIONER

## 2020-01-31 PROCEDURE — 3017F COLORECTAL CA SCREEN DOC REV: CPT | Performed by: NURSE PRACTITIONER

## 2020-01-31 PROCEDURE — 4040F PNEUMOC VAC/ADMIN/RCVD: CPT | Performed by: NURSE PRACTITIONER

## 2020-01-31 PROCEDURE — 99214 OFFICE O/P EST MOD 30 MIN: CPT | Performed by: NURSE PRACTITIONER

## 2020-01-31 PROCEDURE — 1111F DSCHRG MED/CURRENT MED MERGE: CPT | Performed by: NURSE PRACTITIONER

## 2020-01-31 PROCEDURE — 1123F ACP DISCUSS/DSCN MKR DOCD: CPT | Performed by: NURSE PRACTITIONER

## 2020-01-31 PROCEDURE — 3023F SPIROM DOC REV: CPT | Performed by: NURSE PRACTITIONER

## 2020-01-31 PROCEDURE — G8399 PT W/DXA RESULTS DOCUMENT: HCPCS | Performed by: NURSE PRACTITIONER

## 2020-01-31 PROCEDURE — G8926 SPIRO NO PERF OR DOC: HCPCS | Performed by: NURSE PRACTITIONER

## 2020-01-31 PROCEDURE — G8417 CALC BMI ABV UP PARAM F/U: HCPCS | Performed by: NURSE PRACTITIONER

## 2020-01-31 PROCEDURE — 1090F PRES/ABSN URINE INCON ASSESS: CPT | Performed by: NURSE PRACTITIONER

## 2020-01-31 PROCEDURE — G8427 DOCREV CUR MEDS BY ELIG CLIN: HCPCS | Performed by: NURSE PRACTITIONER

## 2020-01-31 ASSESSMENT — ENCOUNTER SYMPTOMS
EYE ITCHING: 0
WHEEZING: 0
ABDOMINAL PAIN: 0
SHORTNESS OF BREATH: 1
COUGH: 0
SINUS PAIN: 0
CHEST TIGHTNESS: 0

## 2020-01-31 ASSESSMENT — COPD QUESTIONNAIRES: COPD: 1

## 2020-01-31 NOTE — PROGRESS NOTES
1/31/2020    This is a 69 y.o. female   Chief Complaint   Patient presents with    COPD     dizziness at home (last week 1 day ) tired lathargic, pale, BP has been low- 70/54 O2 87-92 at the time   . HPI  This is a 70 y/o female presenting for follow-up of hyperlipidemia, prediabetes, GERD, COPD, osteopenia, and insomnia. Hyperlipidemia: Tolerating Lipitor well. No new myalgias or GI upset on atorvastatin (Lipitor). Tries to maintain low fat diet supplemented with mild exercise. GERD: Patient reports pain management specialist, Dr. Tena Pizarro, increased Protonix dose from 20 mg to 40 mg due to increased reflux symptoms because of daily Meloxicam use. COPD: Denies issues since last exacerbation in 1/3/2020. Stable with maintenance inhaler and rescue inhaler. She continues to be followed by pulmonologist Dr. Hernandez Briceno. Quit smoking 1/1/20. Been wearing patches. Granddaughter continues to smoke in the basement of her house, but she is moving out next week. Osteopenia: Last DEXA was 11/2017. Denies new pain. Taking calcium and vitamin D supplement daily. Insomnia: Denies issues. Stable on amitriptyline. Prediabetes: Last Hgb A1c 6.1 (7/2019). Tries to maintain low carb diet and mild exercise several times per a week. Patient is here with her daughter. Reports that last week she was moving house items around getting ready for an . She was feeling lightheaded. When daughter came back into the room she was sitting in the chair and lowered her to the floor thought she was going to pass out. BP was 70/54. O2 was 87-92. She did not eat much that day. Ate a sandwich and felt much better. BP increased to 132/80. Denies chest pain or headache or palpitation. Took a laxative the night before but this is not abnormal for her. Did not have diarrhea.      Lab Results   Component Value Date    LABA1C 6.1 07/05/2019    LABA1C 6.0 01/04/2019    LABA1C 6.4 05/11/2018     Lab Results   Component Value Date    LABMICR YES 10/25/2019    CREATININE 0.7 01/04/2020     Lab Results   Component Value Date    ALT 16 01/04/2020    AST 14 (L) 01/04/2020     Lab Results   Component Value Date    CHOL 161 07/05/2019    TRIG 109 07/05/2019    HDL 56 07/05/2019    LDLCALC 83 07/05/2019    LDLDIRECT 176 (H) 10/12/2012           Patient Active Problem List   Diagnosis    Chronic obstructive pulmonary disease ([facility] [address]    Osteopenia-last dexa 2009 repeat 2015 (-2.4 hip)--advised tx    Colon polyp, hyperplastic,-(done 3/11-repeat 3-5 yrs--dr Cherri Mays)    Family history of colon cancer    CTS (carpal tunnel syndrome)BILATERAL-s/p surgical repair--resolved     Postmenopausal status-last mammogram 2/15 wnl last pap 12/13--wnl    Nondependent alcohol abuse, in remission    Urticaria, chronic    Tobacco abuse-advised to quit--lung cancer screening neg 5/18--repeat low dose ct 1 yr    Chronic back pain-(djd) saw dr Margaux Conley afford surgery--seeing dr Daisy Delong for pain meds    Degeneration of lumbar or lumbosacral intervertebral disc    Fibromyalgia    Hypercholesteremia    Lumbar spondylosis    Vitamin D deficiency--severe--started 50,000 iu 2x/ wk 11/13    Insomnia--on elevil w help    Constipation--on daily miralax    Prediabetes--a1c was 6.3 1/16--diet advised    Depression    Chronic pain syndrome    Muscle cramping    Acute and chronic respiratory failure with hypoxia ([address]    ROLY (acute kidney injury) ([address]    Gastroesophageal reflux disease    COPD, severe ([address]    Chronic hypoxemic respiratory failure (HCC)    Degeneration of lumbar or lumbosacral intervertebral disc    Trochanteric bursitis of right hip    COPD exacerbation (HCC)          Current Outpatient Medications   Medication Sig Dispense Refill    pantoprazole (PROTONIX) 40 MG tablet Take 1 tablet by mouth daily 30 tablet 1    gabapentin (NEURONTIN) 600 MG tablet Take 1 tablet by mouth 3 times daily for 30 days.  90 tablet 1    QUEtiapine (SEROQUEL) 25 MG tablet Take 1-2 tablets by mouth nightly 60 tablet 0    DULoxetine (CYMBALTA) 60 MG extended release capsule Take one tablet at night 30 capsule 1    tiZANidine (ZANAFLEX) 4 MG tablet Take one tablet po BID 60 tablet 1    meloxicam (MOBIC) 15 MG tablet TAKE 1 TABLET BY MOUTH DAILY 30 tablet 1    oxyCODONE-acetaminophen (PERCOCET) 7.5-325 MG per tablet Take 1 tablet by mouth every 6 hours as needed for Pain for up to 28 days. Max 4 a day 112 tablet 0    albuterol sulfate  (90 Base) MCG/ACT inhaler Inhale 2 puffs into the lungs every 6 hours as needed for Shortness of Breath 1 Inhaler 2    atorvastatin (LIPITOR) 80 MG tablet TAKE 1 TABLET BY MOUTH EVERY DAY FOR CHOLESTEROL 90 tablet 1    Fluticasone Furoate-Vilanterol (BREO ELLIPTA) 200-25 MCG/INH AEPB INHALE 1 PUFF INTO THE LUNGS DAILY 1 each 5    ipratropium-albuterol (DUONEB) 0.5-2.5 (3) MG/3ML SOLN nebulizer solution Take 1 aerosol every 4 hours for 2 days and then as needed for shortness of breath coughing and wheezing 360 mL 3    Cholecalciferol (VITAMIN D) 2000 units TABS tablet Take 1 tablet by mouth daily 30 tablet     cetirizine (ZYRTEC) 10 MG tablet Take 10 mg by mouth daily as needed for Allergies      Nebulizers (COMPRESSOR/NEBULIZER) MISC 1 Units by Does not apply route 4 times daily 1 each 3     No current facility-administered medications for this visit. No Known Allergies    Review of Systems   Constitutional: Positive for fatigue. Negative for activity change and fever. HENT: Negative for congestion and sinus pain. Eyes: Negative for itching. Respiratory: Positive for shortness of breath (no worse than baseline). Negative for cough, chest tightness and wheezing. Cardiovascular: Negative for chest pain, palpitations and leg swelling. Gastrointestinal: Negative for abdominal pain. Musculoskeletal: Positive for arthralgias and myalgias.    Neurological: Negative for dizziness, syncope, weakness, numbness and headaches. Psychiatric/Behavioral: Negative for confusion. All other systems reviewed and are negative. Vitals:    01/31/20 0827 01/31/20 0840 01/31/20 0845 01/31/20 0850   BP: 114/80 102/60 92/60 100/70   Site:  Left Upper Arm Left Upper Arm Left Upper Arm   Position:  Supine Sitting Standing   Pulse: 87 98 74 74   SpO2: 97%      Weight: 153 lb (69.4 kg)          Body mass index is 26.26 kg/m². Wt Readings from Last 3 Encounters:   01/31/20 153 lb (69.4 kg)   01/14/20 150 lb (68 kg)   01/10/20 150 lb (68 kg)       BP Readings from Last 3 Encounters:   01/31/20 114/80   01/14/20 123/70   01/10/20 120/78       Physical Exam  Vitals signs and nursing note reviewed. Constitutional:       General: She is not in acute distress. Appearance: Normal appearance. She is well-developed. She is not diaphoretic. HENT:      Head: Normocephalic and atraumatic. Neck:      Musculoskeletal: Neck supple. Vascular: No carotid bruit. Cardiovascular:      Rate and Rhythm: Normal rate and regular rhythm. Heart sounds: Normal heart sounds. No murmur. No friction rub. No gallop. Comments: Regular rate and rhythm without murmurs, rubs, or gallops. Pulmonary:      Effort: Pulmonary effort is normal. No respiratory distress. Breath sounds: Normal breath sounds. Musculoskeletal:      Right lower leg: No edema. Left lower leg: No edema. Skin:     General: Skin is warm and dry. Capillary Refill: Capillary refill takes less than 2 seconds. Neurological:      Mental Status: She is alert and oriented to person, place, and time. Coordination: Coordination normal.         Assessmentand Plan  Khurram Reddy was seen today for follow-up. Diagnoses and all orders for this visit:    Hypercholesteremia  -     LIPID PANEL controlled 7/2019  -     COMPREHENSIVE METABOLIC PANEL; Future  Continue atorvastatin 80 mg daily.   Advised to continue low fat/choleserol diet

## 2020-02-03 ENCOUNTER — TELEPHONE (OUTPATIENT)
Dept: FAMILY MEDICINE CLINIC | Age: 75
End: 2020-02-03

## 2020-02-03 ENCOUNTER — APPOINTMENT (OUTPATIENT)
Dept: GENERAL RADIOLOGY | Age: 75
End: 2020-02-03
Payer: MEDICARE

## 2020-02-03 ENCOUNTER — HOSPITAL ENCOUNTER (EMERGENCY)
Age: 75
Discharge: HOME | End: 2020-02-04
Attending: EMERGENCY MEDICINE
Payer: MEDICARE

## 2020-02-03 PROBLEM — E11.9 NEW ONSET TYPE 2 DIABETES MELLITUS (HCC): Status: ACTIVE | Noted: 2020-02-03

## 2020-02-03 LAB
A/G RATIO: 1.7 (ref 1.1–2.2)
ALBUMIN SERPL-MCNC: 4 G/DL (ref 3.4–5)
ALP BLD-CCNC: 78 U/L (ref 40–129)
ALT SERPL-CCNC: 12 U/L (ref 10–40)
ANION GAP SERPL CALCULATED.3IONS-SCNC: 11 MMOL/L (ref 3–16)
AST SERPL-CCNC: 14 U/L (ref 15–37)
BASOPHILS ABSOLUTE: 0 K/UL (ref 0–0.2)
BASOPHILS RELATIVE PERCENT: 0.7 %
BILIRUB SERPL-MCNC: 0.5 MG/DL (ref 0–1)
BUN BLDV-MCNC: 13 MG/DL (ref 7–20)
CALCIUM SERPL-MCNC: 8.9 MG/DL (ref 8.3–10.6)
CHLORIDE BLD-SCNC: 102 MMOL/L (ref 99–110)
CO2: 26 MMOL/L (ref 21–32)
CREAT SERPL-MCNC: 1 MG/DL (ref 0.6–1.2)
EOSINOPHILS ABSOLUTE: 0.2 K/UL (ref 0–0.6)
EOSINOPHILS RELATIVE PERCENT: 3.8 %
GFR AFRICAN AMERICAN: >60
GFR NON-AFRICAN AMERICAN: 54
GLOBULIN: 2.3 G/DL
GLUCOSE BLD-MCNC: 103 MG/DL
GLUCOSE BLD-MCNC: 103 MG/DL (ref 70–99)
GLUCOSE BLD-MCNC: 151 MG/DL (ref 70–99)
HCT VFR BLD CALC: 37.2 % (ref 36–48)
HEMOGLOBIN: 12.3 G/DL (ref 12–16)
LYMPHOCYTES ABSOLUTE: 1.6 K/UL (ref 1–5.1)
LYMPHOCYTES RELATIVE PERCENT: 36.2 %
MCH RBC QN AUTO: 31.6 PG (ref 26–34)
MCHC RBC AUTO-ENTMCNC: 33.1 G/DL (ref 31–36)
MCV RBC AUTO: 95.4 FL (ref 80–100)
MONOCYTES ABSOLUTE: 0.3 K/UL (ref 0–1.3)
MONOCYTES RELATIVE PERCENT: 7.9 %
NEUTROPHILS ABSOLUTE: 2.3 K/UL (ref 1.7–7.7)
NEUTROPHILS RELATIVE PERCENT: 51.4 %
PDW BLD-RTO: 15.7 % (ref 12.4–15.4)
PERFORMED ON: ABNORMAL
PLATELET # BLD: 249 K/UL (ref 135–450)
PMV BLD AUTO: 6.9 FL (ref 5–10.5)
POTASSIUM REFLEX MAGNESIUM: 4.1 MMOL/L (ref 3.5–5.1)
RBC # BLD: 3.9 M/UL (ref 4–5.2)
SODIUM BLD-SCNC: 139 MMOL/L (ref 136–145)
TOTAL PROTEIN: 6.3 G/DL (ref 6.4–8.2)
WBC # BLD: 4.4 K/UL (ref 4–11)

## 2020-02-03 PROCEDURE — 99285 EMERGENCY DEPT VISIT HI MDM: CPT

## 2020-02-03 PROCEDURE — 80053 COMPREHEN METABOLIC PANEL: CPT

## 2020-02-03 PROCEDURE — 71046 X-RAY EXAM CHEST 2 VIEWS: CPT

## 2020-02-03 PROCEDURE — 93005 ELECTROCARDIOGRAM TRACING: CPT | Performed by: EMERGENCY MEDICINE

## 2020-02-03 PROCEDURE — 85025 COMPLETE CBC W/AUTO DIFF WBC: CPT

## 2020-02-03 ASSESSMENT — PAIN SCALES - GENERAL: PAINLEVEL_OUTOF10: 0

## 2020-02-04 VITALS
OXYGEN SATURATION: 94 % | WEIGHT: 149.47 LBS | HEIGHT: 64 IN | SYSTOLIC BLOOD PRESSURE: 157 MMHG | DIASTOLIC BLOOD PRESSURE: 87 MMHG | HEART RATE: 60 BPM | RESPIRATION RATE: 16 BRPM | BODY MASS INDEX: 25.52 KG/M2 | TEMPERATURE: 97.6 F

## 2020-02-04 LAB
BILIRUBIN URINE: NEGATIVE
BLOOD, URINE: ABNORMAL
CLARITY: CLEAR
COLOR: YELLOW
COMMENT UA: NORMAL
EKG ATRIAL RATE: 60 BPM
EKG DIAGNOSIS: NORMAL
EKG P AXIS: 33 DEGREES
EKG P-R INTERVAL: 158 MS
EKG Q-T INTERVAL: 440 MS
EKG QRS DURATION: 74 MS
EKG QTC CALCULATION (BAZETT): 440 MS
EKG R AXIS: 29 DEGREES
EKG T AXIS: 39 DEGREES
EKG VENTRICULAR RATE: 60 BPM
EPITHELIAL CELLS, UA: NORMAL /HPF
GLUCOSE URINE: NEGATIVE MG/DL
KETONES, URINE: NEGATIVE MG/DL
LEUKOCYTE ESTERASE, URINE: NEGATIVE
MICROSCOPIC EXAMINATION: YES
NITRITE, URINE: NEGATIVE
PH UA: 5.5 (ref 5–8)
PROTEIN UA: NEGATIVE MG/DL
RBC UA: NORMAL /HPF (ref 0–2)
SPECIFIC GRAVITY UA: 1 (ref 1–1.03)
URINE REFLEX TO CULTURE: ABNORMAL
URINE TYPE: ABNORMAL
UROBILINOGEN, URINE: 1 E.U./DL
WBC UA: NORMAL /HPF (ref 0–5)

## 2020-02-04 PROCEDURE — 81001 URINALYSIS AUTO W/SCOPE: CPT

## 2020-02-04 PROCEDURE — 93010 ELECTROCARDIOGRAM REPORT: CPT | Performed by: INTERNAL MEDICINE

## 2020-02-04 NOTE — ED NOTES
D/C: Order noted for d/c. Pt confirmed d/c paperwork does have correct name. Discharge and education instructions reviewed with patient. Teach-back successful. Pt verbalized understanding and signed d/c papers. Pt denied questions at this time. No acute distress noted. Patient instructed to follow-up as noted - return to emergency department if symptoms worsen. Patient verbalized understanding. Discharged per EDMD with discharge instructions. Pt discharged to private vehicle. Patient stable upon departure. Thanked patient for choosing [facility] for care.       Mary Bansal RN  02/04/20 1190

## 2020-02-06 ASSESSMENT — ENCOUNTER SYMPTOMS
PHOTOPHOBIA: 0
VOMITING: 0
SHORTNESS OF BREATH: 0
TROUBLE SWALLOWING: 0
COUGH: 0
ABDOMINAL PAIN: 0
COLOR CHANGE: 0

## 2020-02-06 NOTE — ED PROVIDER NOTES
[facility]  eMERGENCY dEPARTMENTeNCOUnter      Pt Name: Mary Stone  MRN: [id number]  [place] 1945  Date ofevaluation: 2/3/2020  Provider: Maddy Pandya MD    CHIEF COMPLAINT       Chief Complaint   Patient presents with   Equilla Yasir Other     reports taking home bp 84/48. pt reports being dizzy. reports \"been having bp issues\" has been seen by pcp         HISTORY OF PRESENT ILLNESS   (Location/Symptom, Timing/Onset,Context/Setting, Quality, Duration, Modifying Factors, Severity)  Note limiting factors. Mary Stone is a 69 y.o. female who presents to the emergency department for evaluation of hypotension and lightheadedness. Patient states that she is been having blood pressure issues and is currently having her medications adjusted by her primary care physician. Patient states that she felt lightheaded and fell like she might pass out at home and checked her blood pressure noticed that it was in the 84W systolically. Patient presented to the emergency department for evaluation. Patient did have recent admission to the hospital for treatment of COPD and pneumonia. She states that she has been doing well since discharge and denies any recent infectious symptoms. at time of evaluation patient is symptom-free. Blood pressures are within normal limits. HPI    NursingNotes were reviewed. REVIEW OF SYSTEMS    (2-9 systems for level 4, 10 or more for level 5)     Review of Systems   Constitutional: Negative for activity change, fatigue and fever. HENT: Negative for congestion, mouth sores and trouble swallowing. Eyes: Negative for photophobia and visual disturbance. Respiratory: Negative for cough and shortness of breath. Cardiovascular: Negative for chest pain and palpitations. Gastrointestinal: Negative for abdominal pain and vomiting. Genitourinary: Negative for difficulty urinating, dysuria and frequency.    Musculoskeletal: Negative for gait problem and neck pain. Skin: Negative for color change and rash. Neurological: Positive for light-headedness. Negative for dizziness, seizures, syncope, facial asymmetry, weakness, numbness and headaches. Psychiatric/Behavioral: Negative for confusion. The patient is not nervous/anxious. All other systems reviewed and are negative. Except as noted above the remainder of the review of systems was reviewed and negative. PAST MEDICAL HISTORY     Past Medical History:   Diagnosis Date    Chronic pain syndrome     Colorectal polyps     [facility] obstructive pulmonary disease) [facility])     CTS (carpal tunnel syndrome)     BILATERAL    DDD (degenerative disc disease), lumbar     Depression     Family hx of colon cancer     Fibromyalgia     Hyperlipemia     IBS (irritable bowel syndrome)     Lumbar spondylosis     [facility] 2/3/2020    Urticaria, chronic          SURGICALHISTORY       Past Surgical History:   Procedure Laterality Date    CERVICAL POLYP REMOVAL  9/2004    TYMPANOSTOMY TUBE PLACEMENT           CURRENT MEDICATIONS       Discharge Medication List as of 2/4/2020 12:34 AM      CONTINUE these medications which have NOT CHANGED    Details   pantoprazole (PROTONIX) 40 MG tablet Take 1 tablet by mouth daily, Disp-30 tablet, R-1Normal      gabapentin (NEURONTIN) 600 MG tablet Take 1 tablet by mouth 3 times daily for 30 days. , Disp-90 tablet, R-1Normal      QUEtiapine (SEROQUEL) 25 MG tablet Take 1-2 tablets by mouth nightly, Disp-60 tablet, R-0Normal      DULoxetine (CYMBALTA) 60 MG extended release capsule Take one tablet at night, Disp-30 capsule, R-1Normal      tiZANidine (ZANAFLEX) 4 MG tablet Take one tablet po BID, Disp-60 tablet, R-1Normal      meloxicam (MOBIC) 15 MG tablet TAKE 1 TABLET BY MOUTH DAILY, Disp-30 tablet, R-1Normal      oxyCODONE-acetaminophen (PERCOCET) 7.5-325 MG per tablet Take 1 tablet by mouth every 6 hours as needed for Pain 0.0    Smokeless tobacco: Never Used    Tobacco comment: thinking of quitting   Substance and Sexual Activity    Alcohol use: No     Alcohol/week: 0.0 standard drinks    Drug use: No    Sexual activity: Yes     Partners: Male   Lifestyle    Physical activity:     Days per week: Not on file     Minutes per session: Not on file    Stress: Not on file   Relationships    Social connections:     Talks on phone: Not on file     Gets together: Not on file     Attends Church service: Not on file     Active member of club or organization: Not on file     Attends meetings of clubs or organizations: Not on file     Relationship status: Not on file    Intimate partner violence:     Fear of current or ex partner: Not on file     Emotionally abused: Not on file     Physically abused: Not on file     Forced sexual activity: Not on file   Other Topics Concern    Not on file   Social History Narrative    Not on file       SCREENINGS    Kai Coma Scale  Eye Opening: Spontaneous  Best Verbal Response: Oriented  Best Motor Response: Obeys commands  [place] Coma Scale Score: 15 @FLOW([id number])@      PHYSICAL EXAM    (up to 7 for level 4, 8 or more for level 5)     ED Triage Vitals [02/03/20 1805]   BP Temp Temp Source Pulse Resp SpO2 Height Weight   123/69 97.6 °F (36.4 °C) Oral 60 16 94 % 5' 4\" (1.626 m) 149 lb 7.6 oz (67.8 kg)       Physical Exam  Vitals signs and nursing note reviewed. Constitutional:       Appearance: She is well-developed. HENT:      Head: Normocephalic and atraumatic. Eyes:      Extraocular Movements: Extraocular movements intact. Conjunctiva/sclera: Conjunctivae normal.      Pupils: Pupils are equal, round, and reactive to light. Neck:      Musculoskeletal: Normal range of motion. Trachea: No tracheal deviation. Cardiovascular:      Rate and Rhythm: Normal rate and regular rhythm. Heart sounds: Normal heart sounds.    Pulmonary:      Effort: Pulmonary effort is normal. Breath sounds: Normal breath sounds. Abdominal:      General: There is no distension. Palpations: Abdomen is soft. Tenderness: There is no abdominal tenderness. Musculoskeletal: Normal range of motion. Skin:     General: Skin is warm and dry. Capillary Refill: Capillary refill takes 2 to 3 seconds. Neurological:      General: No focal deficit present. Mental Status: She is alert and oriented to person, place, and time. Mental status is at baseline. Cranial Nerves: No cranial nerve deficit. Sensory: No sensory deficit. Motor: No weakness. Coordination: Coordination normal.      Gait: Gait normal.         DIAGNOSTIC RESULTS     EKG: All EKG's are interpreted by the Emergency Department Physician who either signs or Co-signsthis chart in the absence of a cardiologist.    Patient's EKG shows sinus rhythm with a ventricular rate of 60 bpm.  Patient's CO interval and QTc interval within acceptable range. Patient is a normal axis. There are no significant ST elevations or depressions EKG is nondiagnostic for ACS. Compared to EKG from 1/3/2020 there are no significant changes. RADIOLOGY:   Non-plain filmimages such as CT, Ultrasound and MRI are read by the radiologist. Plain radiographic images are visualized and preliminarily interpreted by the emergency physician with the below findings:        Interpretation per the Radiologist below, if available at the time ofthis note:    XR CHEST STANDARD (2 VW)   Final Result   No acute abnormality identified.                ED BEDSIDE ULTRASOUND:   Performed by ED Physician - none    LABS:  Labs Reviewed   CBC WITH AUTO DIFFERENTIAL - Abnormal; Notable for the following components:       Result Value    RBC 3.90 (*)     RDW 15.7 (*)     All other components within normal limits    Narrative:     Performed at:  [address] [address]   Phone [phone] COMPREHENSIVE METABOLIC PANEL W/ REFLEX TO MG FOR LOW K - Abnormal; Notable for the following components:    Glucose 151 (*)     GFR Non- 54 (*)     Total Protein 6.3 (*)     AST 14 (*)     All other components within normal limits    Narrative:     Performed at:  [address] [facility] [place] [address]   Phone [phone]   URINE RT REFLEX TO CULTURE - Abnormal; Notable for the following components:    Blood, Urine TRACE (*)     All other components within normal limits    Narrative:     Performed at:  [address] [facility] [place] [address]   Phone [phone]   POCT GLUCOSE - Abnormal; Notable for the following components:    POC Glucose 103 (*)     All other components within normal limits    Narrative:     Performed at:  [address] [facility] [place] [address]   Phone [phone]   POCT GLUCOSE - Normal   MICROSCOPIC URINALYSIS    Narrative:     Performed at:  [address] [facility] [place] [address]   Phone [phone]       All other labs were within normal range or not returned as of this dictation. EMERGENCY DEPARTMENT COURSE and DIFFERENTIAL DIAGNOSIS/MDM:   Vitals:    Vitals:    02/03/20 1805 02/03/20 2330 02/04/20 0017 02/04/20 0032   BP: 123/69 (!) 163/73 (!) 161/74 (!) 157/87   Pulse: 60      Resp: 16      Temp: 97.6 °F (36.4 °C)      TempSrc: Oral      SpO2: 94%      Weight: 149 lb 7.6 oz (67.8 kg)      Height: 5' 4\" (1.626 m)              MDM  Number of Diagnoses or Management Options  Hypotension, unspecified hypotension type:   Diagnosis management comments: 59-year-old female presents ED for evaluation of lightheadedness and hypotension. Patient states that she been having issues with her blood pressures dropping and becoming symptomatic.   She denies recent infectious symptoms and states that she is compliant with her medications. Patient has been altering her medications with the aid of her primary care physician. On presentation patient is symptom-free. Vital signs are within normal limits. Patient currently has no complaints. Basic laboratory work-up was ordered which did not show any emergent abnormalities. Renal function within normal limits. Urinalysis did not show UTI. Chest x-ray negative for cardiopulmonary disease. Orthostatics were checked in the emergency department and the patient remained symptom-free. REASSESSMENT      As the patient is currently symptom believe she is safe discharge home. Discussed continue to adjust her medications with her the of her PCP. Patient encouraged to exercise caution when changing positions. Results were discussed with the patient and why it was of the opinion that the patient was suitable for discharge. Patient is amenable to discharge home. Return indications discussed with the patient. Patient demonstrates understanding of when to return for reevaluation for persistent or worsening symptoms. CRITICAL CARE TIME   Total Critical Care time was 0 minutes, excluding separatelyreportable procedures. There was a high probability ofclinically significant/life threatening deterioration in the patient's condition which required my urgent intervention. CONSULTS:  None    PROCEDURES:  Unless otherwise noted below, none     Procedures    FINAL IMPRESSION      1.  Hypotension, unspecified hypotension type          DISPOSITION/PLAN   DISPOSITION Decision To Discharge 02/04/2020 12:30:20 AM      PATIENT REFERREDTO:  Ulysses Webster, HECTOR - CNP  [address] [address]  [phone]    Schedule an appointment as soon as possible for a visit   As needed      DISCHARGEMEDICATIONS:  Discharge Medication List as of 2/4/2020 12:34 AM             (Please note that portions of this note were completed with a voice recognition program.  Efforts were made to edit the dictations but occasionally words are mis-transcribed.)    Jos eAngel Naranjo MD (electronically signed)  Attending Emergency Physician          Jose Angel Naranjo MD  02/06/20 1270

## 2020-02-07 ENCOUNTER — OFFICE VISIT (OUTPATIENT)
Dept: FAMILY MEDICINE CLINIC | Age: 75
End: 2020-02-07
Payer: MEDICARE

## 2020-02-07 VITALS
OXYGEN SATURATION: 98 % | DIASTOLIC BLOOD PRESSURE: 70 MMHG | HEIGHT: 64 IN | SYSTOLIC BLOOD PRESSURE: 110 MMHG | BODY MASS INDEX: 25.78 KG/M2 | HEART RATE: 67 BPM | WEIGHT: 151 LBS

## 2020-02-07 DIAGNOSIS — E11.9 NEW ONSET TYPE 2 DIABETES MELLITUS (HCC): ICD-10-CM

## 2020-02-07 LAB
ANION GAP SERPL CALCULATED.3IONS-SCNC: 11 MMOL/L (ref 3–16)
BUN BLDV-MCNC: 14 MG/DL (ref 7–20)
CALCIUM SERPL-MCNC: 9.4 MG/DL (ref 8.3–10.6)
CHLORIDE BLD-SCNC: 96 MMOL/L (ref 99–110)
CO2: 29 MMOL/L (ref 21–32)
CREAT SERPL-MCNC: 1 MG/DL (ref 0.6–1.2)
CREATININE URINE: 84 MG/DL (ref 28–259)
GFR AFRICAN AMERICAN: >60
GFR NON-AFRICAN AMERICAN: 54
GLUCOSE BLD-MCNC: 107 MG/DL (ref 70–99)
MICROALBUMIN UR-MCNC: <1.2 MG/DL
MICROALBUMIN/CREAT UR-RTO: NORMAL MG/G (ref 0–30)
POTASSIUM SERPL-SCNC: 4.5 MMOL/L (ref 3.5–5.1)
SODIUM BLD-SCNC: 136 MMOL/L (ref 136–145)

## 2020-02-07 PROCEDURE — 4040F PNEUMOC VAC/ADMIN/RCVD: CPT | Performed by: NURSE PRACTITIONER

## 2020-02-07 PROCEDURE — 1036F TOBACCO NON-USER: CPT | Performed by: NURSE PRACTITIONER

## 2020-02-07 PROCEDURE — G8417 CALC BMI ABV UP PARAM F/U: HCPCS | Performed by: NURSE PRACTITIONER

## 2020-02-07 PROCEDURE — 1090F PRES/ABSN URINE INCON ASSESS: CPT | Performed by: NURSE PRACTITIONER

## 2020-02-07 PROCEDURE — 99214 OFFICE O/P EST MOD 30 MIN: CPT | Performed by: NURSE PRACTITIONER

## 2020-02-07 PROCEDURE — G8427 DOCREV CUR MEDS BY ELIG CLIN: HCPCS | Performed by: NURSE PRACTITIONER

## 2020-02-07 PROCEDURE — G8399 PT W/DXA RESULTS DOCUMENT: HCPCS | Performed by: NURSE PRACTITIONER

## 2020-02-07 PROCEDURE — 3044F HG A1C LEVEL LT 7.0%: CPT | Performed by: NURSE PRACTITIONER

## 2020-02-07 PROCEDURE — 3017F COLORECTAL CA SCREEN DOC REV: CPT | Performed by: NURSE PRACTITIONER

## 2020-02-07 PROCEDURE — 2022F DILAT RTA XM EVC RTNOPTHY: CPT | Performed by: NURSE PRACTITIONER

## 2020-02-07 PROCEDURE — 1123F ACP DISCUSS/DSCN MKR DOCD: CPT | Performed by: NURSE PRACTITIONER

## 2020-02-07 PROCEDURE — G8482 FLU IMMUNIZE ORDER/ADMIN: HCPCS | Performed by: NURSE PRACTITIONER

## 2020-02-07 RX ORDER — BLOOD-GLUCOSE METER
1 KIT MISCELLANEOUS DAILY
Qty: 1 KIT | Refills: 0 | Status: SHIPPED | OUTPATIENT
Start: 2020-02-07 | End: 2022-07-18

## 2020-02-07 ASSESSMENT — ENCOUNTER SYMPTOMS
SHORTNESS OF BREATH: 0
COUGH: 0

## 2020-02-07 NOTE — PROGRESS NOTES
2/7/2020    This is a 69 y.o. female   Chief Complaint   Patient presents with    Diabetes     er visit 2/3/20 pt bp dropped and she almost passed out   . HPI  Patient is here to discuss her new dx of DM that was noted on recent labs. Has had history of prediabetes for years. Diabetes Mellitus Type 2:   Diet- eats donuts in the AM. Knows that she could improve on her diet. Exercise- none      Hyperlipidemia:  No new myalgias or GI upset on atorvastatin (Lipitor). Lab Results   Component Value Date    LABA1C 6.6 01/31/2020    LABA1C 6.1 07/05/2019    LABA1C 6.0 01/04/2019     Lab Results   Component Value Date    LABMICR YES 02/04/2020    CREATININE 1.0 02/03/2020     Lab Results   Component Value Date    ALT 12 02/03/2020    AST 14 (L) 02/03/2020     Lab Results   Component Value Date    CHOL 161 07/05/2019    TRIG 109 07/05/2019    HDL 56 07/05/2019    LDLCALC 83 07/05/2019    LDLDIRECT 176 (H) 10/12/2012        Was seen in ER on 2/3/20 for hypotension. BP was low at home and felt that she was going to pass out. Went to ER for evaluation. BP was normal there. Did not have orthostatic hypotension. She has been trying to eat more and stay hydrated with water. Has not had recurrent symptoms.        Patient Active Problem List   Diagnosis    Chronic obstructive [facility] disease ([facility]    Osteopenia-last dexa 2009 repeat 2015 (-2.4 hip)--advised tx    Colon polyp, hyperplastic,-(done 3/11-repeat 3-5 yrs--dr Joesluis Ramos)    Family history of colon cancer    CTS (carpal tunnel syndrome)BILATERAL-s/p surgical repair--resolved     Postmenopausal status-last mammogram 2/15 wnl last pap 12/13--wnl    Nondependent alcohol abuse, in remission    Urticaria, chronic    Tobacco abuse-advised to quit--lung cancer screening neg 5/18--repeat low dose ct 1 yr    Chronic back pain-(djd) saw dr Holly Born afford surgery--seeing dr Joshua King for pain meds    Degeneration of lumbar or lumbosacral intervertebral tablet Take 1 tablet by mouth daily 30 tablet     cetirizine (ZYRTEC) 10 MG tablet Take 10 mg by mouth daily as needed for Allergies      Nebulizers (COMPRESSOR/NEBULIZER) MISC 1 Units by Does not apply route 4 times daily 1 each 3    QUEtiapine (SEROQUEL) 25 MG tablet Take 1-2 tablets by mouth nightly 60 tablet 0     No current facility-administered medications for this visit. No Known Allergies    Review of Systems   Constitutional: Positive for fatigue. Negative for activity change and fever. Respiratory: Negative for cough and shortness of breath. Cardiovascular: Negative for chest pain. Neurological: Negative for syncope and headaches. Vitals:    02/07/20 1350   BP: 110/70   Site: Right Upper Arm   Position: Sitting   Cuff Size: Medium Adult   Pulse: 67   SpO2: 98%   Weight: 151 lb (68.5 kg)   Height: 5' 4\" (1.626 m)       Body mass index is 25.92 kg/m². Wt Readings from Last 3 Encounters:   02/07/20 151 lb (68.5 kg)   02/03/20 149 lb 7.6 oz (67.8 kg)   01/31/20 153 lb (69.4 kg)       BP Readings from Last 3 Encounters:   02/07/20 110/70   02/04/20 (!) 157/87   01/31/20 100/70       Physical Exam  Vitals signs and nursing note reviewed. Constitutional:       General: She is not in acute distress. Appearance: She is well-developed. HENT:      Head: Normocephalic and atraumatic. Neck:      Musculoskeletal: Neck supple. Cardiovascular:      Rate and Rhythm: Normal rate and regular rhythm. Heart sounds: Normal heart sounds. No murmur. No friction rub. No gallop. Pulmonary:      Effort: Pulmonary effort is normal. No respiratory distress. Breath sounds: Normal breath sounds. Feet:      Right foot:      Protective Sensation: 10 sites tested. 10 sites sensed. Skin integrity: Dry skin present. Toenail Condition: Right toenails are ingrown. Left foot:      Protective Sensation: 10 sites tested. 10 sites sensed. Skin integrity: Dry skin present.

## 2020-02-11 ENCOUNTER — OFFICE VISIT (OUTPATIENT)
Dept: PAIN MANAGEMENT | Age: 75
End: 2020-02-11
Payer: MEDICARE

## 2020-02-11 VITALS
HEART RATE: 78 BPM | SYSTOLIC BLOOD PRESSURE: 144 MMHG | WEIGHT: 151 LBS | DIASTOLIC BLOOD PRESSURE: 76 MMHG | BODY MASS INDEX: 25.92 KG/M2

## 2020-02-11 PROCEDURE — G8417 CALC BMI ABV UP PARAM F/U: HCPCS | Performed by: INTERNAL MEDICINE

## 2020-02-11 PROCEDURE — 4040F PNEUMOC VAC/ADMIN/RCVD: CPT | Performed by: INTERNAL MEDICINE

## 2020-02-11 PROCEDURE — 1036F TOBACCO NON-USER: CPT | Performed by: INTERNAL MEDICINE

## 2020-02-11 PROCEDURE — 3017F COLORECTAL CA SCREEN DOC REV: CPT | Performed by: INTERNAL MEDICINE

## 2020-02-11 PROCEDURE — G8399 PT W/DXA RESULTS DOCUMENT: HCPCS | Performed by: INTERNAL MEDICINE

## 2020-02-11 PROCEDURE — 1123F ACP DISCUSS/DSCN MKR DOCD: CPT | Performed by: INTERNAL MEDICINE

## 2020-02-11 PROCEDURE — G8427 DOCREV CUR MEDS BY ELIG CLIN: HCPCS | Performed by: INTERNAL MEDICINE

## 2020-02-11 PROCEDURE — 1090F PRES/ABSN URINE INCON ASSESS: CPT | Performed by: INTERNAL MEDICINE

## 2020-02-11 PROCEDURE — 99213 OFFICE O/P EST LOW 20 MIN: CPT | Performed by: INTERNAL MEDICINE

## 2020-02-11 PROCEDURE — G8482 FLU IMMUNIZE ORDER/ADMIN: HCPCS | Performed by: INTERNAL MEDICINE

## 2020-02-11 RX ORDER — OXYCODONE AND ACETAMINOPHEN 7.5; 325 MG/1; MG/1
1 TABLET ORAL EVERY 6 HOURS PRN
Qty: 112 TABLET | Refills: 0 | Status: SHIPPED | OUTPATIENT
Start: 2020-02-11 | End: 2020-03-10 | Stop reason: SDUPTHER

## 2020-02-11 RX ORDER — QUETIAPINE FUMARATE 25 MG/1
25-50 TABLET, FILM COATED ORAL NIGHTLY
Qty: 60 TABLET | Refills: 0 | Status: SHIPPED | OUTPATIENT
Start: 2020-02-11 | End: 2020-04-07

## 2020-02-11 NOTE — PROGRESS NOTES
[facility]  1945  <[id number]>    HISTORY OF PRESENT ILLNESS:  Ms. Dagoberto Farrell is a 69 y.o. female returns for a follow up visit for multiple medical problems. Her current presenting problems are   1. Chronic pain syndrome    2. Osteoarthritis of spine with radiculopathy, lumbar region    3. Degeneration of lumbar or lumbosacral intervertebral disc    4. Fibromyalgia    5. Lumbar spondylosis    6. Trochanteric bursitis of right hip    7. DDD (degenerative disc disease), lumbar    8. Primary insomnia    9. Gastroesophageal reflux disease without esophagitis    10. Drug-induced constipation    11. Moderate episode of recurrent major depressive disorder ([facility]    12. Muscle cramping    . As per information/history obtained from the PADT(patient assessment and documentation tool) - She complains of pain in the lower back with radiation to the hips Right She rates the pain 5/10 and describes it as aching. Pain is made worse by: standing. Current treatment regimen has helped relieve about 50% of the pain. She denies side effects from the current pain regimen. Patient reports that since the last follow up visit the physical functioning is unchanged, family/social relationships are unchanged, mood is unchanged and sleep patterns are unchanged, and that the overall functioning is unchanged. Patient denies neurological bowel or bladder. Patient denies misusing/abusing her narcotic pain medications or using any illegal drugs. There are No indicators for possible drug abuse, addiction or diversion problems. Upon obtaining the medical history from Ms. Dagoberto Farrell regarding today's office visit for her presenting problems, Ms. Dagoberto Farrell states she is having increase headaches, She complains of increased pain with changes in weather. Extreme temperatures- cold and damp weather causes increased pain. She states she is using Zanaflex PRN. Patient has been compliant with her regimen.  Patient says she has been using Neurontin bedtime, long or frequent naps during the day, erratic sleep schedule, heavy meals near bedtime, vigorous exercise near bedtime and use of electronic devices near bedtime   -walking as tolerated   -she was advised  to avoid using too many OTC analgesics to control the headaches, avoid chocolates, increased caffeine, cheeses and MSG nitrite containing foods, cigarette smoking. To avoid bright lights, strong smells and skipping meals. Will try Cambia for headaches, will give samples. Current Outpatient Medications   Medication Sig Dispense Refill    Blood Glucose Monitoring Suppl (ONE TOUCH ULTRA MINI) w/Device KIT 1 kit by Does not apply route daily 1 kit 0    ONE TOUCH LANCETS MISC 1 each by Does not apply route daily 100 each 3    blood glucose test strips (ASCENSIA AUTODISC VI;ONE TOUCH ULTRA TEST VI) strip 1 each by In Vitro route daily 100 each 3    pantoprazole (PROTONIX) 40 MG tablet Take 1 tablet by mouth daily 30 tablet 1    gabapentin (NEURONTIN) 600 MG tablet Take 1 tablet by mouth 3 times daily for 30 days. 90 tablet 1    QUEtiapine (SEROQUEL) 25 MG tablet Take 1-2 tablets by mouth nightly 60 tablet 0    DULoxetine (CYMBALTA) 60 MG extended release capsule Take one tablet at night 30 capsule 1    tiZANidine (ZANAFLEX) 4 MG tablet Take one tablet po BID 60 tablet 1    meloxicam (MOBIC) 15 MG tablet TAKE 1 TABLET BY MOUTH DAILY 30 tablet 1    oxyCODONE-acetaminophen (PERCOCET) 7.5-325 MG per tablet Take 1 tablet by mouth every 6 hours as needed for Pain for up to 28 days.  Max 4 a day 112 tablet 0    albuterol sulfate  (90 Base) MCG/ACT inhaler Inhale 2 puffs into the lungs every 6 hours as needed for Shortness of Breath 1 Inhaler 2    atorvastatin (LIPITOR) 80 MG tablet TAKE 1 TABLET BY MOUTH EVERY DAY FOR CHOLESTEROL 90 tablet 1    Fluticasone Furoate-Vilanterol (BREO ELLIPTA) 200-25 MCG/INH AEPB INHALE 1 PUFF INTO THE LUNGS DAILY 1 each 5    ipratropium-albuterol (DUONEB) 0.5-2.5 work and social and leisure activities. Improve psychosocial and physical functioning. - she is showing progression towards this treatment goal with the current regimen. She was advised against drinking alcohol with the narcotic pain medicines, advised against driving or handling machinery while adjusting the dose of medicines or if having cognitive  issues related to the current medications. Risk of overdose and death, if medicines not taken as prescribed, were also discussed. If the patient develops new symptoms or if the symptoms worsen, the patient should call the office. While transcribing every attempt was made to maintain the accuracy of the note in terms of it's contents,there may have been some errors made inadvertently. Thank you for allowing me to participate in the care of this patient. Antione Ceron MD.    Cc: HECTOR Jasso - FRED PICKETT, Anselmo Lin, scribing for in the presence  of Dr. Antione Ceron.   02/11/20  12:47 PM  John Mccurdy Assistant    I, Dr. Antione Ceron, personally performed the services described in this documentation as scribed by  Anselmo Lin MA in my presence and it is both accurate and complete

## 2020-02-12 ENCOUNTER — SCHEDULED TELEPHONE ENCOUNTER (OUTPATIENT)
Dept: PHARMACY | Age: 75
End: 2020-02-12

## 2020-02-12 NOTE — TELEPHONE ENCOUNTER
[address] [facility]  Diabetes Service  [phone]    NAME: Brooks Liao  MEDICAL RECORD NUMBER:  [id number] was referred to our Diabetes Center by Lopez Benavides NP. Follow up phone call:           Lab Results   Component Value Date    LABA1C 6.6 01/31/2020    LABA1C 6.1 07/05/2019    LABA1C 6.0 01/04/2019       Do you have a working glucose meter? Yes    Have you been checking your blood glucose daily? Yes   []  No, A1c and blood glucose stable, appropriate.   []  Yes, checking as recommended. []  No, so we recommended that the patient check their blood glucose as instructed by their physician or by the pharmacist.     What was your blood glucose this morning before breakfast? 101 (This should be between  for most patients)    Have you had a recent blood glucose reading less than 70? No    Have you had a recent blood glucose reading greater than 400? No    Are you out of any of your medications or diabetes supplies? No    Do you have any questions about your medications? No      Do you have a follow up scheduled with your physician? Yes  Do you have a follow up scheduled with the [address] [place] [address]? Yes        Please call any time with questions or concerns. Do not wait to call. PLAN:   Scheduled appointment on 2/28    Appropriate staff has been notified with regards to any concerns noted above     [address] [place] [address]  Diabetes Service  [facility]  Diabetes Service  [phone]    NAME: [id number] RECORD NUMBER:  [id number] was referred to our Diabetes Center by Lopez Benavides NP      Follow up phone call:    Are you having any issues that need immediate attention? No    Do you have any signs or symptoms of low blood glucose?  No   []  Light-headedness   []  Confusion   []  Feeling nervous or shaky   []  Dizziness   []  Feeling sleepy   []  Feeling sweaty     Do you have any signs or symptoms of high blood glucose? No   []  Feeling very tired   []  Feeling very thirsty or hungry   []  Frequent urination   []  Headache         []  Blurred vision     Lab Results   Component Value Date    LABA1C 6.6 01/31/2020    LABA1C 6.1 07/05/2019    LABA1C 6.0 01/04/2019       Do you have a working glucose meter? Yes    Have you been checking your blood glucose daily? Yes   []  No, A1c and blood glucose stable, appropriate.   []  Yes, checking as recommended. []  No, so we recommended that the patient check their blood glucose as instructed by their physician or by the pharmacist.     What was your blood glucose this morning before breakfast? 101 (This should be between  for most patients)    Have you had a recent blood glucose reading less than 70? No    Have you had a recent blood glucose reading greater than 400? No    Are you out of any of your medications or diabetes supplies? No    Do you have any questions about your medications? No    Have you been admitted to the hospital or visited an emergency room recently? No   If yes, be sure to follow the medication instructions given to you when you were sent home. If yes, we recommend following up with your physician within 7 days of being discharged. Do you have a follow up scheduled with your physician? Yes  Do you have a follow up scheduled with the [address] [place] [address]? Yes    Is your A1c at your goal (typically < 7%)? No. If not, we can help you reach your goal.     Do you have any questions or concerns that I can help you with today? No    Please call any time with questions or concerns. Do not wait to call. PLAN:   Scheduled appointment 2/28.       Appropriate staff has been notified with regards to any concerns noted above     [address] [place] [address]  Diabetes Service  [phone]

## 2020-02-21 ENCOUNTER — OFFICE VISIT (OUTPATIENT)
Dept: PHARMACY | Age: 75
End: 2020-02-21
Payer: MEDICARE

## 2020-02-21 VITALS
HEART RATE: 68 BPM | WEIGHT: 149.3 LBS | BODY MASS INDEX: 25.63 KG/M2 | OXYGEN SATURATION: 95 % | SYSTOLIC BLOOD PRESSURE: 118 MMHG | DIASTOLIC BLOOD PRESSURE: 61 MMHG

## 2020-02-21 PROCEDURE — 99214 OFFICE O/P EST MOD 30 MIN: CPT

## 2020-02-21 NOTE — PROGRESS NOTES
[facility]  Pharmacy  Diabetes Service    [address], [address]  Phone: [phone]  Fax: [phone]    NAME: Cody Li  MEDICAL RECORD NUMBER:  [id number]  AGE: 69 y.o. GENDER: female  : 1945  EPISODE DATE:  2020       Ms. Rayray Preston was referred to the [address] [facility]. Special instructions per referral include: patient education    Goals per referral:   Fasting blood glucose: <   Peak postprandial glucose: <   A1C: <     If goals are not included on the referral, ADA guidelines will be used. Subjective   Ms. Rayray Preston is a 69 y.o. female here for the Diabetes Service for self-management education, medication review including over the counter medications and herbal products, overall wellbeing assessment, transition of care and any needed adjustments with updates and recommendations communicated to the referring physician. Patient's name and  verified. Patient findings such as missed doses, medication changes, diet changes, activity changes, recent hospitalization or emergency room visit:  decreased pop intake; trying to decrease high carb foods. Donuts for breakfast \"I won't give up donuts\"    Symptoms of hypoglycemia such as shakiness, lightheadedness, dizziness, confusion, or sweating: Yes, reports shakiness and sweating and then will get chills. Symptoms of hyperglycemia such as frequent urination, increased thirst, or increased hunger: Yes, all of the above as well as vision changes, fatigue and nerve pain, but unsure if nerve pain and numbness is Diabetes related. Will occasional get a sharp pain in the bottom of her foot. Also occasionally get numbness in her whole leg. Has degenerative disc disease.       Medication adverse reactions such as GI symptoms, hypertension, increased edema, signs of infection, headache, vision changes, increased cholesterol, weight gain, change Results   Component Value Date    CREATININE 1.0 02/07/2020    BUN 14 02/07/2020     02/07/2020    K 4.5 02/07/2020    CL 96 (L) 02/07/2020    CO2 29 02/07/2020     If creatinine elevated, medications affected were addressed:   - WNL    Lab Results   Component Value Date    MALBCR see below 02/07/2020     If not completed in the past year and not on an ACE Inhibitor, recommended lab:    - current  If elevated, recommended considering ACE Inhibitor if appropriate:   - WNL    Lab Results   Component Value Date    CHOL 161 07/05/2019    TRIG 109 07/05/2019    HDL 56 07/05/2019    LDLCALC 83 07/05/2019     Lab Results   Component Value Date    ALT 12 02/03/2020     Reviewed LDL and recommended therapy if appropriate: Yes. Ms. Porfirio Polk is on a Statin: Yes     Weight:  Wt Readings from Last 3 Encounters:   02/21/20 149 lb 4.8 oz (67.7 kg)   02/11/20 151 lb (68.5 kg)   02/07/20 151 lb (68.5 kg)     If BMI elevated, weight loss recommended:   - she is right at 25. Did not focus on this. Advised she would likely lose a few pounds with diet changes. Immunizations:   Most Recent Immunizations   Administered Date(s) Administered    Influenza Vaccine, unspecified formulation 01/10/2017    Influenza, High Dose (Fluzone 65 yrs and older) 10/30/2018    Influenza, Triv, inactivated, subunit, adjuvanted, IM (Fluad 65 yrs and older) 09/13/2019    Pneumococcal Conjugate 13-valent ([id number]) 01/19/2015    Pneumococcal Conjugate Vaccine 01/10/2017    Pneumococcal Polysaccharide ([id number]) 10/12/2012    Tdap (Boostrix, Adacel) 07/17/2007    Zoster Recombinant (Shingrix) 11/21/2019     If Influenza not current, recommended or given:   - current  If Pneumococcal vaccinations not current, recommended or given:   - current    Assessment     Home Blood Glucose Results:   Date range:   From: 2/10/20  To: 2/21/20  Before breakfast:   95 - 113      Not checking any other time of day.   She feels like she may have spikes in BG and may have episodes of hypoglycemia. Patient's current eating patterns: donut for breakfast (limiting to #1) \"I won't give this up. \" Drinks pop. Limiting to 1-2 per day. Trying to drink more water - praised her for this. She was open to [employer], but not Diet soda. She eats a banana just about every day. Physical activity: 0 minutes 0 times per week  Weight management plan: N/A    Medications reviewed by the Pharmacist: No   [] compared patient's bottles with EPIC list  [] patient did not bring medication bottles    Current medications:  Current Outpatient Medications   Medication Sig Dispense Refill    QUEtiapine (SEROQUEL) 25 MG tablet Take 1-2 tablets by mouth nightly 60 tablet 0    oxyCODONE-acetaminophen (PERCOCET) 7.5-325 MG per tablet Take 1 tablet by mouth every 6 hours as needed for Pain for up to 28 days. Max 4 a day 112 tablet 0    Blood Glucose Monitoring Suppl (ONE TOUCH ULTRA MINI) w/Device KIT 1 kit by Does not apply route daily 1 kit 0    ONE TOUCH LANCETS MISC 1 each by Does not apply route daily 100 each 3    blood glucose test strips (ASCENSIA AUTODISC VI;ONE TOUCH ULTRA TEST VI) strip 1 each by In Vitro route daily 100 each 3    pantoprazole (PROTONIX) 40 MG tablet Take 1 tablet by mouth daily 30 tablet 1    gabapentin (NEURONTIN) 600 MG tablet Take 1 tablet by mouth 3 times daily for 30 days.  90 tablet 1    DULoxetine (CYMBALTA) 60 MG extended release capsule Take one tablet at night 30 capsule 1    tiZANidine (ZANAFLEX) 4 MG tablet Take one tablet po BID 60 tablet 1    meloxicam (MOBIC) 15 MG tablet TAKE 1 TABLET BY MOUTH DAILY 30 tablet 1    albuterol sulfate  (90 Base) MCG/ACT inhaler Inhale 2 puffs into the lungs every 6 hours as needed for Shortness of Breath 1 Inhaler 2    atorvastatin (LIPITOR) 80 MG tablet TAKE 1 TABLET BY MOUTH EVERY DAY FOR CHOLESTEROL 90 tablet 1    Fluticasone Furoate-Vilanterol (BREO ELLIPTA) 200-25 MCG/INH AEPB INHALE 1 PUFF INTO of wheat but adds sugar. Her daughter advised to try sweet n low. Just an option to think about. She had an egg and yogurt for lunch today. Encouraged non starchy vegetables. More consistent meals and little time in between . May need to eat more low carb snacks throughout the day. Physician Follow-up for Diabetes: yes       Plan     Action taken today including medication changes:  Education as noted above.      Recommendations to the physician:  [facility] Follow-up   Diabetes Service   As needed     Electronically signed by Chantelle YatesD on 2/21/2020 at 5:00 PM

## 2020-03-06 ENCOUNTER — OFFICE VISIT (OUTPATIENT)
Dept: FAMILY MEDICINE CLINIC | Age: 75
End: 2020-03-06
Payer: MEDICARE

## 2020-03-06 VITALS
DIASTOLIC BLOOD PRESSURE: 60 MMHG | BODY MASS INDEX: 25.78 KG/M2 | HEART RATE: 80 BPM | HEIGHT: 64 IN | SYSTOLIC BLOOD PRESSURE: 118 MMHG | OXYGEN SATURATION: 94 % | WEIGHT: 151 LBS

## 2020-03-06 DIAGNOSIS — R42 DIZZINESS: ICD-10-CM

## 2020-03-06 DIAGNOSIS — R55 SYNCOPE AND COLLAPSE: ICD-10-CM

## 2020-03-06 LAB
ANION GAP SERPL CALCULATED.3IONS-SCNC: 11 MMOL/L (ref 3–16)
BASOPHILS ABSOLUTE: 0 K/UL (ref 0–0.2)
BASOPHILS RELATIVE PERCENT: 0.5 %
BUN BLDV-MCNC: 16 MG/DL (ref 7–20)
CALCIUM SERPL-MCNC: 9.7 MG/DL (ref 8.3–10.6)
CHLORIDE BLD-SCNC: 104 MMOL/L (ref 99–110)
CO2: 29 MMOL/L (ref 21–32)
CREAT SERPL-MCNC: 0.8 MG/DL (ref 0.6–1.2)
EOSINOPHILS ABSOLUTE: 0.3 K/UL (ref 0–0.6)
EOSINOPHILS RELATIVE PERCENT: 3.8 %
GFR AFRICAN AMERICAN: >60
GFR NON-AFRICAN AMERICAN: >60
GLUCOSE BLD-MCNC: 90 MG/DL (ref 70–99)
HCT VFR BLD CALC: 40.9 % (ref 36–48)
HEMOGLOBIN: 13.6 G/DL (ref 12–16)
LYMPHOCYTES ABSOLUTE: 1.8 K/UL (ref 1–5.1)
LYMPHOCYTES RELATIVE PERCENT: 23.9 %
MCH RBC QN AUTO: 31.7 PG (ref 26–34)
MCHC RBC AUTO-ENTMCNC: 33.3 G/DL (ref 31–36)
MCV RBC AUTO: 95.2 FL (ref 80–100)
MONOCYTES ABSOLUTE: 0.5 K/UL (ref 0–1.3)
MONOCYTES RELATIVE PERCENT: 7.1 %
NEUTROPHILS ABSOLUTE: 4.8 K/UL (ref 1.7–7.7)
NEUTROPHILS RELATIVE PERCENT: 64.7 %
PDW BLD-RTO: 15.3 % (ref 12.4–15.4)
PLATELET # BLD: 307 K/UL (ref 135–450)
PMV BLD AUTO: 7.5 FL (ref 5–10.5)
POTASSIUM SERPL-SCNC: 4.5 MMOL/L (ref 3.5–5.1)
RBC # BLD: 4.29 M/UL (ref 4–5.2)
SODIUM BLD-SCNC: 144 MMOL/L (ref 136–145)
WBC # BLD: 7.4 K/UL (ref 4–11)

## 2020-03-06 PROCEDURE — 1123F ACP DISCUSS/DSCN MKR DOCD: CPT | Performed by: NURSE PRACTITIONER

## 2020-03-06 PROCEDURE — 4040F PNEUMOC VAC/ADMIN/RCVD: CPT | Performed by: NURSE PRACTITIONER

## 2020-03-06 PROCEDURE — 1090F PRES/ABSN URINE INCON ASSESS: CPT | Performed by: NURSE PRACTITIONER

## 2020-03-06 PROCEDURE — G8427 DOCREV CUR MEDS BY ELIG CLIN: HCPCS | Performed by: NURSE PRACTITIONER

## 2020-03-06 PROCEDURE — 99214 OFFICE O/P EST MOD 30 MIN: CPT | Performed by: NURSE PRACTITIONER

## 2020-03-06 PROCEDURE — G8399 PT W/DXA RESULTS DOCUMENT: HCPCS | Performed by: NURSE PRACTITIONER

## 2020-03-06 PROCEDURE — 93000 ELECTROCARDIOGRAM COMPLETE: CPT | Performed by: NURSE PRACTITIONER

## 2020-03-06 PROCEDURE — 3017F COLORECTAL CA SCREEN DOC REV: CPT | Performed by: NURSE PRACTITIONER

## 2020-03-06 PROCEDURE — G8482 FLU IMMUNIZE ORDER/ADMIN: HCPCS | Performed by: NURSE PRACTITIONER

## 2020-03-06 PROCEDURE — G8417 CALC BMI ABV UP PARAM F/U: HCPCS | Performed by: NURSE PRACTITIONER

## 2020-03-06 PROCEDURE — 1036F TOBACCO NON-USER: CPT | Performed by: NURSE PRACTITIONER

## 2020-03-06 ASSESSMENT — ENCOUNTER SYMPTOMS
NAUSEA: 0
COUGH: 0
SHORTNESS OF BREATH: 0
DIARRHEA: 0
VOMITING: 0

## 2020-03-06 NOTE — PROGRESS NOTES
3/6/2020    This is a 69 y.o. female   Chief Complaint   Patient presents with    Hypertension     BP MACHINE AT HOME HAS BP RUNNING LOW, PT ASLO FELL TO THE FLOOR    . HPI  Patient reports that when she goes from a sitting to a standing position she will have dizziness. She reports that he dizziness is constant even at rest.     Last Thursday had a fall and her BP was 59-86/38-44  Reports that she felt lightheaded. She does not feel that she hit her head. Reports that she denies chest pain, shortness of breath, heart palpitations. Reports that she does have chronic blurry vision and has an upcoming eye exam.     Has been dealing with chronic headaches for the past month. Reports that she will get a severe headache that will last a couple of days each month. Her chronic pain medication does not help with the headache. Had a fall in Sept 2019 and was seen in ER and had CT of head that did not show acute intracranial abnormality. Diabetes Mellitus Type 2: Current symptoms/problems include none. Did go to diabetic education 2/21/20. She feels that she is eating better. Making sure to eat every couple of hours. Denies hypoglycemia. Home blood sugar records: fasting range:  Did a couple of two hours after meal and it has been 132-144. Any episodes of hypoglycemia? no  Eye exam current (within one year): no- has one scheduled for later this month   Tobacco history: She  reports that she quit smoking about 2 months ago. Her smoking use included cigarettes. She started smoking about 58 years ago. She has a 82.50 pack-year smoking history.  She has never used smokeless tobacco.        Lab Results   Component Value Date    LABA1C 6.6 01/31/2020    LABA1C 6.1 07/05/2019    LABA1C 6.0 01/04/2019     Lab Results   Component Value Date    LABMICR <1.20 02/07/2020    CREATININE 1.0 02/07/2020     Lab Results   Component Value Date    ALT 12 02/03/2020    AST 14 (L) 02/03/2020     Lab Results   Component Value Date    CHOL 161 07/05/2019    TRIG 109 07/05/2019    HDL 56 07/05/2019    LDLCALC 83 07/05/2019    LDLDIRECT 176 (H) 10/12/2012           Patient Active Problem List   Diagnosis    Chronic obstructive pulmonary disease ([facility]    Osteopenia-last dexa 2009 repeat 2015 (-2.4 hip)--advised tx    Colon polyp, hyperplastic,-(done 3/11-repeat 3-5 yrs--dr Karsten Good)    Family history of colon cancer    CTS (carpal tunnel syndrome)BILATERAL-s/p surgical repair--resolved     Postmenopausal status-last mammogram 2/15 wnl last pap 12/13--wnl    Nondependent alcohol abuse, in remission    Urticaria, chronic    Tobacco abuse-advised to quit--lung cancer screening neg 5/18--repeat low dose ct 1 yr    Chronic back pain-(djd) saw dr Nuzhat Sexton afford surgery--seeing dr Calista Rm for pain meds    Degeneration of lumbar or lumbosacral intervertebral disc    Fibromyalgia    Hypercholesteremia    Lumbar spondylosis    Vitamin D deficiency--severe--started 50,000 iu 2x/ wk 11/13    Insomnia--on elevil w help    Constipation--on daily miralax    Prediabetes--a1c was 6.3 1/16--diet advised    Depression    Chronic pain syndrome    Muscle cramping    Acute and chronic respiratory failure with hypoxia ([address]    ROLY (acute kidney injury) ([address]    Gastroesophageal reflux disease    COPD, severe ([address]    Chronic hypoxemic respiratory failure (HCC)    Degeneration of lumbar or lumbosacral intervertebral disc    Trochanteric bursitis of right hip    COPD exacerbation ([address]    New onset type 2 diabetes mellitus (HCC)          Current Outpatient Medications   Medication Sig Dispense Refill    QUEtiapine (SEROQUEL) 25 MG tablet Take 1-2 tablets by mouth nightly 60 tablet 0    oxyCODONE-acetaminophen (PERCOCET) 7.5-325 MG per tablet Take 1 tablet by mouth every 6 hours as needed for Pain for up to 28 days.  Max 4 a day 112 tablet 0    Blood Glucose Monitoring Suppl (ONE TOUCH ULTRA MINI) w/Device KIT 1 kit by Stephane Rodarte MD, Cardiology, [facility]  -     MRI Brain WO Contrast; Future  -     Basic Metabolic Panel; Future  -     CBC Auto Differential; Future  Recurrent issue. Recheck labs. Check holter monitor   Check Brain MRI   Refer to cardiologist for evaluation. Having low BP at home, but normal here. Does not have orthostatic hypotension on exam today. Advised to stay hydrated with water. Should change positions slowly. Should avoid driving at this time. Dizziness  -     MRI Brain WO Contrast; Future  -     Basic Metabolic Panel; Future  -     CBC Auto Differential; Future  Check brain MRI due to recurrent dizziness, headache, and syncope     Acute nonintractable headache, unspecified headache type  -     MRI Brain WO Contrast; Future  Check brain MRI. Denies recent head trauma from fall, but has had last Sept. CT of head at that time did not show and acute intracranial abnormality. Return in about 4 weeks (around 4/3/2020), or if symptoms worsen or fail to improve, for follow up on syncope .

## 2020-03-10 ENCOUNTER — OFFICE VISIT (OUTPATIENT)
Dept: PAIN MANAGEMENT | Age: 75
End: 2020-03-10
Payer: MEDICARE

## 2020-03-10 VITALS
DIASTOLIC BLOOD PRESSURE: 57 MMHG | WEIGHT: 150 LBS | HEART RATE: 92 BPM | BODY MASS INDEX: 25.75 KG/M2 | SYSTOLIC BLOOD PRESSURE: 107 MMHG

## 2020-03-10 PROCEDURE — G8417 CALC BMI ABV UP PARAM F/U: HCPCS | Performed by: INTERNAL MEDICINE

## 2020-03-10 PROCEDURE — 4040F PNEUMOC VAC/ADMIN/RCVD: CPT | Performed by: INTERNAL MEDICINE

## 2020-03-10 PROCEDURE — G8427 DOCREV CUR MEDS BY ELIG CLIN: HCPCS | Performed by: INTERNAL MEDICINE

## 2020-03-10 PROCEDURE — 1123F ACP DISCUSS/DSCN MKR DOCD: CPT | Performed by: INTERNAL MEDICINE

## 2020-03-10 PROCEDURE — 3017F COLORECTAL CA SCREEN DOC REV: CPT | Performed by: INTERNAL MEDICINE

## 2020-03-10 PROCEDURE — G8482 FLU IMMUNIZE ORDER/ADMIN: HCPCS | Performed by: INTERNAL MEDICINE

## 2020-03-10 PROCEDURE — 1090F PRES/ABSN URINE INCON ASSESS: CPT | Performed by: INTERNAL MEDICINE

## 2020-03-10 PROCEDURE — 99214 OFFICE O/P EST MOD 30 MIN: CPT | Performed by: INTERNAL MEDICINE

## 2020-03-10 PROCEDURE — G8399 PT W/DXA RESULTS DOCUMENT: HCPCS | Performed by: INTERNAL MEDICINE

## 2020-03-10 PROCEDURE — 1036F TOBACCO NON-USER: CPT | Performed by: INTERNAL MEDICINE

## 2020-03-10 RX ORDER — TRAZODONE HYDROCHLORIDE 50 MG/1
50-100 TABLET ORAL NIGHTLY
Qty: 60 TABLET | Refills: 0 | Status: SHIPPED | OUTPATIENT
Start: 2020-03-10 | End: 2020-04-07 | Stop reason: SDUPTHER

## 2020-03-10 RX ORDER — OXYCODONE AND ACETAMINOPHEN 7.5; 325 MG/1; MG/1
1 TABLET ORAL EVERY 6 HOURS PRN
Qty: 112 TABLET | Refills: 0 | Status: SHIPPED | OUTPATIENT
Start: 2020-03-10 | End: 2020-04-07 | Stop reason: SDUPTHER

## 2020-03-10 RX ORDER — MELOXICAM 15 MG/1
TABLET ORAL
Qty: 30 TABLET | Refills: 1 | Status: SHIPPED | OUTPATIENT
Start: 2020-03-10 | End: 2020-04-07 | Stop reason: SDUPTHER

## 2020-03-10 RX ORDER — TIZANIDINE 4 MG/1
TABLET ORAL
Qty: 60 TABLET | Refills: 1 | Status: SHIPPED | OUTPATIENT
Start: 2020-03-10 | End: 2020-04-07 | Stop reason: SDUPTHER

## 2020-03-10 RX ORDER — PANTOPRAZOLE SODIUM 40 MG/1
40 TABLET, DELAYED RELEASE ORAL DAILY
Qty: 30 TABLET | Refills: 1 | Status: SHIPPED | OUTPATIENT
Start: 2020-03-10 | End: 2020-04-07 | Stop reason: SDUPTHER

## 2020-03-10 RX ORDER — DULOXETIN HYDROCHLORIDE 60 MG/1
CAPSULE, DELAYED RELEASE ORAL
Qty: 30 CAPSULE | Refills: 1 | Status: SHIPPED | OUTPATIENT
Start: 2020-03-10 | End: 2020-04-07 | Stop reason: SDUPTHER

## 2020-03-10 RX ORDER — GABAPENTIN 600 MG/1
600 TABLET ORAL 2 TIMES DAILY
Qty: 60 TABLET | Refills: 1 | Status: SHIPPED | OUTPATIENT
Start: 2020-03-10 | End: 2020-04-07 | Stop reason: SDUPTHER

## 2020-03-10 NOTE — PROGRESS NOTES
Brooks Liao  1945  <[id number]>    HISTORY OF PRESENT ILLNESS:  Ms. Ana Lilia Fairchild is a 69 y.o. female returns for a follow up visit for multiple medical problems. Her current presenting problems are   1. Chronic pain syndrome    2. Osteoarthritis of spine with radiculopathy, lumbar region    3. Degeneration of lumbar or lumbosacral intervertebral disc    4. Fibromyalgia    5. Lumbar spondylosis    6. Trochanteric bursitis of right hip    7. Primary insomnia    8. Gastroesophageal reflux disease without esophagitis    9. Drug-induced constipation    10. Moderate episode of recurrent major depressive disorder ([facility] [address]    11. Muscle cramping    . As per information/history obtained from the PADT(patient assessment and documentation tool) - She complains of pain in the lower back with radiation to the hips Bilateral She rates the pain 5/10 and describes it as aching. Pain is made worse by: standing. Current treatment regimen has helped relieve about 50% of the pain. She denies side effects from the current pain regimen. Patient reports that since the last follow up visit the physical functioning is unchanged, family/social relationships are unchanged, mood is unchanged and sleep patterns are unchanged, and that the overall functioning is unchanged. Patient denies neurological bowel or bladder. Patient denies misusing/abusing her narcotic pain medications or using any illegal drugs. There are No indicators for possible drug abuse, addiction or diversion problems. Upon obtaining the medical history from Ms. Ana Lilia Fairchild regarding today's office visit for her presenting problems, Ms. Ana Lilia Fairchild states she had a fall, she passed out, states BP goes down. Patient reports she was diagnosis with DM. She states she is having ringing/head buzzes at times. She says she saw her NP who ordered some tests, she will be getting some work up for syncope, she has had BP is 60's. Patient states her sleep is fair.  Has fairly normal sleep Not on file     Gets together: Not on file     Attends [employer] service: Not on file     Active member of club or organization: Not on file     Attends meetings of clubs or organizations: Not on file     Relationship status: Not on file    Intimate partner violence     Fear of current or ex partner: Not on file     Emotionally abused: Not on file     Physically abused: Not on file     Forced sexual activity: Not on file   Other Topics Concern    Not on file   Social History Narrative    Not on file       Ms. Derek Holman current medications are   Outpatient Medications Prior to Visit   Medication Sig Dispense Refill    QUEtiapine (SEROQUEL) 25 MG tablet Take 1-2 tablets by mouth nightly 60 tablet 0    oxyCODONE-acetaminophen (PERCOCET) 7.5-325 MG per tablet Take 1 tablet by mouth every 6 hours as needed for Pain for up to 28 days.  Max 4 a day 112 tablet 0    Blood Glucose Monitoring Suppl (ONE TOUCH ULTRA MINI) w/Device KIT 1 kit by Does not apply route daily 1 kit 0    ONE TOUCH LANCETS MISC 1 each by Does not apply route daily 100 each 3    blood glucose test strips (ASCENSIA AUTODISC VI;ONE TOUCH ULTRA TEST VI) strip 1 each by In Vitro route daily 100 each 3    pantoprazole (PROTONIX) 40 MG tablet Take 1 tablet by mouth daily 30 tablet 1    DULoxetine (CYMBALTA) 60 MG extended release capsule Take one tablet at night 30 capsule 1    tiZANidine (ZANAFLEX) 4 MG tablet Take one tablet po BID 60 tablet 1    meloxicam (MOBIC) 15 MG tablet TAKE 1 TABLET BY MOUTH DAILY 30 tablet 1    albuterol sulfate  (90 Base) MCG/ACT inhaler Inhale 2 puffs into the lungs every 6 hours as needed for Shortness of Breath 1 Inhaler 2    atorvastatin (LIPITOR) 80 MG tablet TAKE 1 TABLET BY MOUTH EVERY DAY FOR CHOLESTEROL 90 tablet 1    Fluticasone Furoate-Vilanterol (BREO ELLIPTA) 200-25 MCG/INH AEPB INHALE 1 PUFF INTO THE LUNGS DAILY 1 each 5    ipratropium-albuterol (DUONEB) 0.5-2.5 (3) MG/3ML SOLN nebulizer solution Seroquel  -will monitor orthostatics  -decrease Neurontin 600 BID  -advised to increase fluids  -advised to check BP 3 times a day  -start Trazodone 50 mg 1-2 nightly  -continue with Cymbalta  -continue with Percocet 4 per day  -she was advised proper sleep hygiene-told to avoid:use of caffeine or other stimulants after noon, alcohol use near bedtime, long or frequent naps during the day, erratic sleep schedule, heavy meals near bedtime, vigorous exercise near bedtime and use of electronic devices near bedtime   -She was advised to increase fluids ( 5-7  glasses of fluid daily), limit caffeine, avoid cheese products, increase dietary fiber, increase activity and exercise as tolerated and relax regularly and enjoy meals      Ms. Ruth Ennis will be prescribed  the medications  listed below which are for treatment of her presenting  medical problems which for this visit include:   Diagnoses of Chronic pain syndrome, Osteoarthritis of spine with radiculopathy, lumbar region, Degeneration of lumbar or lumbosacral intervertebral disc, Fibromyalgia, Lumbar spondylosis, Trochanteric bursitis of right hip, Primary insomnia, Gastroesophageal reflux disease without esophagitis, Drug-induced constipation, Moderate episode of recurrent major depressive disorder ([facility] [address] and Muscle cramping were pertinent to this visit. Medications/orders associated with this visit:    Current Outpatient Medications   Medication Sig Dispense Refill    QUEtiapine (SEROQUEL) 25 MG tablet Take 1-2 tablets by mouth nightly 60 tablet 0    oxyCODONE-acetaminophen (PERCOCET) 7.5-325 MG per tablet Take 1 tablet by mouth every 6 hours as needed for Pain for up to 28 days.  Max 4 a day 112 tablet 0    Blood Glucose Monitoring Suppl (ONE TOUCH ULTRA MINI) w/Device KIT 1 kit by Does not apply route daily 1 kit 0    ONE TOUCH LANCETS MISC 1 each by Does not apply route daily 100 each 3    blood glucose test strips (ASCENSIA AUTODISC VI;ONE TOUCH ULTRA TEST VI) strip 1 each by In Vitro route daily 100 each 3    pantoprazole (PROTONIX) 40 MG tablet Take 1 tablet by mouth daily 30 tablet 1    DULoxetine (CYMBALTA) 60 MG extended release capsule Take one tablet at night 30 capsule 1    tiZANidine (ZANAFLEX) 4 MG tablet Take one tablet po BID 60 tablet 1    meloxicam (MOBIC) 15 MG tablet TAKE 1 TABLET BY MOUTH DAILY 30 tablet 1    albuterol sulfate  (90 Base) MCG/ACT inhaler Inhale 2 puffs into the lungs every 6 hours as needed for Shortness of Breath 1 Inhaler 2    atorvastatin (LIPITOR) 80 MG tablet TAKE 1 TABLET BY MOUTH EVERY DAY FOR CHOLESTEROL 90 tablet 1    Fluticasone Furoate-Vilanterol (BREO ELLIPTA) 200-25 MCG/INH AEPB INHALE 1 PUFF INTO THE LUNGS DAILY 1 each 5    ipratropium-albuterol (DUONEB) 0.5-2.5 (3) MG/3ML SOLN nebulizer solution Take 1 aerosol every 4 hours for 2 days and then as needed for shortness of breath coughing and wheezing 360 mL 3    Cholecalciferol (VITAMIN D) 2000 units TABS tablet Take 1 tablet by mouth daily 30 tablet     cetirizine (ZYRTEC) 10 MG tablet Take 10 mg by mouth daily as needed for Allergies      Nebulizers (COMPRESSOR/NEBULIZER) MISC 1 Units by Does not apply route 4 times daily 1 each 3    gabapentin (NEURONTIN) 600 MG tablet Take 1 tablet by mouth 3 times daily for 30 days. 90 tablet 1     No current facility-administered medications for this visit. Goals of current treatment regimen include improvement in pain, restoration of functioning- with focus on improvement in physical performance, general activity, work or disability,emotional distress, health care utilization and  decreased medication consumption. Will continue to monitor progress towards achieving/maintaining therapeutic goals with special emphasis on  1. Improvement in perceived interfernce  of pain with ADL's. Ability to do home exercises independently.  Ability to do household chores indoor and/or outdoor work and social and leisure

## 2020-03-25 PROBLEM — M51.37 DEGENERATION OF LUMBAR OR LUMBOSACRAL INTERVERTEBRAL DISC: Status: RESOLVED | Noted: 2020-03-25 | Resolved: 2020-03-24

## 2020-03-25 PROBLEM — M51.379 DEGENERATION OF LUMBAR OR LUMBOSACRAL INTERVERTEBRAL DISC: Status: RESOLVED | Noted: 2020-03-25 | Resolved: 2020-03-24

## 2020-04-07 ENCOUNTER — VIRTUAL VISIT (OUTPATIENT)
Dept: PAIN MANAGEMENT | Age: 75
End: 2020-04-07
Payer: MEDICARE

## 2020-04-07 PROCEDURE — 4040F PNEUMOC VAC/ADMIN/RCVD: CPT | Performed by: INTERNAL MEDICINE

## 2020-04-07 PROCEDURE — 99213 OFFICE O/P EST LOW 20 MIN: CPT | Performed by: INTERNAL MEDICINE

## 2020-04-07 PROCEDURE — 3017F COLORECTAL CA SCREEN DOC REV: CPT | Performed by: INTERNAL MEDICINE

## 2020-04-07 PROCEDURE — 1090F PRES/ABSN URINE INCON ASSESS: CPT | Performed by: INTERNAL MEDICINE

## 2020-04-07 PROCEDURE — G8427 DOCREV CUR MEDS BY ELIG CLIN: HCPCS | Performed by: INTERNAL MEDICINE

## 2020-04-07 PROCEDURE — 1123F ACP DISCUSS/DSCN MKR DOCD: CPT | Performed by: INTERNAL MEDICINE

## 2020-04-07 PROCEDURE — G8399 PT W/DXA RESULTS DOCUMENT: HCPCS | Performed by: INTERNAL MEDICINE

## 2020-04-07 RX ORDER — TIZANIDINE 4 MG/1
TABLET ORAL
Qty: 60 TABLET | Refills: 1 | Status: SHIPPED | OUTPATIENT
Start: 2020-04-07 | End: 2020-05-05 | Stop reason: SDUPTHER

## 2020-04-07 RX ORDER — TRAZODONE HYDROCHLORIDE 50 MG/1
50-100 TABLET ORAL NIGHTLY
Qty: 60 TABLET | Refills: 0 | Status: SHIPPED | OUTPATIENT
Start: 2020-04-07 | End: 2020-05-05 | Stop reason: SDUPTHER

## 2020-04-07 RX ORDER — GABAPENTIN 600 MG/1
600 TABLET ORAL 2 TIMES DAILY
Qty: 60 TABLET | Refills: 1 | Status: SHIPPED | OUTPATIENT
Start: 2020-04-07 | End: 2020-05-05 | Stop reason: SDUPTHER

## 2020-04-07 RX ORDER — OXYCODONE AND ACETAMINOPHEN 7.5; 325 MG/1; MG/1
1 TABLET ORAL EVERY 6 HOURS PRN
Qty: 112 TABLET | Refills: 0 | Status: SHIPPED | OUTPATIENT
Start: 2020-04-07 | End: 2020-05-05 | Stop reason: SDUPTHER

## 2020-04-07 RX ORDER — DULOXETIN HYDROCHLORIDE 60 MG/1
CAPSULE, DELAYED RELEASE ORAL
Qty: 30 CAPSULE | Refills: 1 | Status: SHIPPED | OUTPATIENT
Start: 2020-04-07 | End: 2020-05-05 | Stop reason: SDUPTHER

## 2020-04-07 RX ORDER — ATORVASTATIN CALCIUM 80 MG/1
TABLET, FILM COATED ORAL
Qty: 90 TABLET | Refills: 1 | Status: SHIPPED | OUTPATIENT
Start: 2020-04-07 | End: 2020-11-10 | Stop reason: SDUPTHER

## 2020-04-07 RX ORDER — MELOXICAM 15 MG/1
TABLET ORAL
Qty: 30 TABLET | Refills: 1 | Status: SHIPPED | OUTPATIENT
Start: 2020-04-07 | End: 2020-05-05 | Stop reason: SDUPTHER

## 2020-04-07 RX ORDER — PANTOPRAZOLE SODIUM 40 MG/1
40 TABLET, DELAYED RELEASE ORAL DAILY
Qty: 30 TABLET | Refills: 1 | Status: SHIPPED | OUTPATIENT
Start: 2020-04-07 | End: 2020-05-05 | Stop reason: SDUPTHER

## 2020-04-07 NOTE — PROGRESS NOTES
ELLIPTA) 200-25 MCG/INH AEPB INHALE 1 PUFF INTO THE LUNGS DAILY 1 each 5    ipratropium-albuterol (DUONEB) 0.5-2.5 (3) MG/3ML SOLN nebulizer solution Take 1 aerosol every 4 hours for 2 days and then as needed for shortness of breath coughing and wheezing 360 mL 3    Cholecalciferol (VITAMIN D) 2000 units TABS tablet Take 1 tablet by mouth daily 30 tablet     cetirizine (ZYRTEC) 10 MG tablet Take 10 mg by mouth daily as needed for Allergies      Nebulizers (COMPRESSOR/NEBULIZER) MISC 1 Units by Does not apply route 4 times daily 1 each 3    QUEtiapine (SEROQUEL) 25 MG tablet Take 1-2 tablets by mouth nightly 60 tablet 0     No facility-administered medications prior to visit. SOCIAL/FAMILY/PAST MEDICAL HISTORY: Ms. Nelson Hence, family and past medical history was reviewed. REVIEW OF SYSTEMS:    Respiratory: Negative for apnea, chest tightness and shortness of breath or change in baseline breathing. Gastrointestinal: Negative for nausea, vomiting, abdominal pain, diarrhea, constipation, blood in stool and abdominal distention. PHYSICAL EXAM:   Nursing note and vitals per patient reviewed. There were no vitals taken for this visit. Constitutional: She appears well-developed and well-nourished. No acute distress. No respiratory distress. Skin: Skin appears to be warm and dry. No rashes or any other marks noted. She is not diaphoretic. Neurological/Psychiatric:She is alert and oriented to person, place, and time. Coordination is  normal.  Her mood isAppropriate and affect is Flat/blunted. Her behavior is normal.   thought content normal.   Musculoskeletal / Extremities: Range of motion is normal. Gait is normal, assistive devices use: none. Other: Decreased air exchange. IMPRESSION:   1. Chronic pain syndrome    2. Degeneration of lumbar or lumbosacral intervertebral disc    3. Lumbar spondylosis    4. Fibromyalgia    5. DDD (degenerative disc disease), lumbar    6.  Osteoarthritis of spine with radiculopathy, lumbar region    7. Trochanteric bursitis of right hip        PLAN:  Informed verbal consent regarding treatment was obtained  -continue with current opioid regimen, Percocet 7.5 mg   -Adv Biofeedback, relaxation and meditation techniques. Referral to psychologist for CBT was also discussed with patient  -She was advised to increase fluids ( 5-7  glasses of fluid daily), limit caffeine, avoid cheese products, increase dietary fiber, increase activity and exercise as tolerated and relax regularly and enjoy meals   -walking/stretching exercises as advised    -Advised patient to quit smoking for  health related concerns and to improve the treatment outcomes. Education was given on quitting smoking and the use of different modalities including medications, hypnotherapy, counselling  and biofeedback. These were discussed with patient. Current Outpatient Medications   Medication Sig Dispense Refill    atorvastatin (LIPITOR) 80 MG tablet TAKE 1 TABLET BY MOUTH EVERY DAY FOR CHOLESTEROL 90 tablet 1    DULoxetine (CYMBALTA) 60 MG extended release capsule Take one tablet at night 30 capsule 1    tiZANidine (ZANAFLEX) 4 MG tablet Take one tablet po BID 60 tablet 1    pantoprazole (PROTONIX) 40 MG tablet Take 1 tablet by mouth daily 30 tablet 1    meloxicam (MOBIC) 15 MG tablet TAKE 1 TABLET BY MOUTH DAILY 30 tablet 1    oxyCODONE-acetaminophen (PERCOCET) 7.5-325 MG per tablet Take 1 tablet by mouth every 6 hours as needed for Pain for up to 28 days. Max 4 a day 112 tablet 0    gabapentin (NEURONTIN) 600 MG tablet Take 1 tablet by mouth 2 times daily for 30 days.  60 tablet 1    traZODone (DESYREL) 50 MG tablet Take 1-2 tablets by mouth nightly 60 tablet 0    Blood Glucose Monitoring Suppl (ONE TOUCH ULTRA MINI) w/Device KIT 1 kit by Does not apply route daily 1 kit 0    ONE TOUCH LANCETS MISC 1 each by Does not apply route daily 100 each 3    blood glucose test strips (ASCENSIA AUTODISC independently. Ability to do household chores indoor and/or outdoor work and social and leisure activities. Improve psychosocial and physical functioning. - she is showing progression towards this treatment goal with the current regimen. She was advised against drinking alcohol with the narcotic pain medicines, advised against driving or handling machinery while adjusting the dose of medicines or if having cognitive  issues related to the current medications. Risk of overdose and death, if medicines not taken as prescribed, were also discussed. If the patient develops new symptoms or if the symptoms worsen, the patient should call the office. While transcribing every attempt was made to maintain the accuracy of the note in terms of it's contents,there may have been some errors made inadvertently. Thank you for allowing me to participate in the care of this patient.     Radha Scott MD.    Cc: HECTOR Gann - CNP

## 2020-05-05 ENCOUNTER — VIRTUAL VISIT (OUTPATIENT)
Dept: PAIN MANAGEMENT | Age: 75
End: 2020-05-05
Payer: MEDICARE

## 2020-05-05 PROCEDURE — 3017F COLORECTAL CA SCREEN DOC REV: CPT | Performed by: INTERNAL MEDICINE

## 2020-05-05 PROCEDURE — G8399 PT W/DXA RESULTS DOCUMENT: HCPCS | Performed by: INTERNAL MEDICINE

## 2020-05-05 PROCEDURE — 99213 OFFICE O/P EST LOW 20 MIN: CPT | Performed by: INTERNAL MEDICINE

## 2020-05-05 PROCEDURE — 4040F PNEUMOC VAC/ADMIN/RCVD: CPT | Performed by: INTERNAL MEDICINE

## 2020-05-05 PROCEDURE — 1123F ACP DISCUSS/DSCN MKR DOCD: CPT | Performed by: INTERNAL MEDICINE

## 2020-05-05 PROCEDURE — G8427 DOCREV CUR MEDS BY ELIG CLIN: HCPCS | Performed by: INTERNAL MEDICINE

## 2020-05-05 PROCEDURE — 1090F PRES/ABSN URINE INCON ASSESS: CPT | Performed by: INTERNAL MEDICINE

## 2020-05-05 RX ORDER — TRAZODONE HYDROCHLORIDE 50 MG/1
50-100 TABLET ORAL NIGHTLY
Qty: 60 TABLET | Refills: 0 | Status: SHIPPED | OUTPATIENT
Start: 2020-05-05 | End: 2020-06-03 | Stop reason: SDUPTHER

## 2020-05-05 RX ORDER — TIZANIDINE 4 MG/1
TABLET ORAL
Qty: 60 TABLET | Refills: 1 | Status: SHIPPED | OUTPATIENT
Start: 2020-05-05 | End: 2020-06-03 | Stop reason: SDUPTHER

## 2020-05-05 RX ORDER — OXYCODONE AND ACETAMINOPHEN 7.5; 325 MG/1; MG/1
1 TABLET ORAL EVERY 6 HOURS PRN
Qty: 112 TABLET | Refills: 0 | Status: SHIPPED | OUTPATIENT
Start: 2020-05-05 | End: 2020-06-03 | Stop reason: SDUPTHER

## 2020-05-05 RX ORDER — GABAPENTIN 600 MG/1
600 TABLET ORAL 2 TIMES DAILY
Qty: 60 TABLET | Refills: 1 | Status: SHIPPED | OUTPATIENT
Start: 2020-05-05 | End: 2020-06-03 | Stop reason: SDUPTHER

## 2020-05-05 RX ORDER — MELOXICAM 15 MG/1
TABLET ORAL
Qty: 30 TABLET | Refills: 1 | Status: SHIPPED | OUTPATIENT
Start: 2020-05-05 | End: 2020-07-07

## 2020-05-05 RX ORDER — DULOXETIN HYDROCHLORIDE 60 MG/1
CAPSULE, DELAYED RELEASE ORAL
Qty: 30 CAPSULE | Refills: 1 | Status: SHIPPED | OUTPATIENT
Start: 2020-05-05 | End: 2020-07-28 | Stop reason: SDUPTHER

## 2020-05-05 RX ORDER — PANTOPRAZOLE SODIUM 40 MG/1
40 TABLET, DELAYED RELEASE ORAL DAILY
Qty: 30 TABLET | Refills: 1 | Status: SHIPPED | OUTPATIENT
Start: 2020-05-05 | End: 2020-07-28 | Stop reason: SDUPTHER

## 2020-05-06 ENCOUNTER — OFFICE VISIT (OUTPATIENT)
Dept: CARDIOLOGY CLINIC | Age: 75
End: 2020-05-06
Payer: MEDICARE

## 2020-05-06 VITALS
TEMPERATURE: 98.7 F | WEIGHT: 145 LBS | HEART RATE: 68 BPM | BODY MASS INDEX: 24.75 KG/M2 | SYSTOLIC BLOOD PRESSURE: 138 MMHG | DIASTOLIC BLOOD PRESSURE: 88 MMHG | HEIGHT: 64 IN

## 2020-05-06 PROCEDURE — 93000 ELECTROCARDIOGRAM COMPLETE: CPT | Performed by: INTERNAL MEDICINE

## 2020-05-06 PROCEDURE — 99204 OFFICE O/P NEW MOD 45 MIN: CPT | Performed by: INTERNAL MEDICINE

## 2020-05-06 PROCEDURE — 0296T PR EXT ECG > 48HR TO 21 DAY RCRD W/CONECT INTL RCRD: CPT | Performed by: INTERNAL MEDICINE

## 2020-05-06 NOTE — PATIENT INSTRUCTIONS
heartbeat. With others, you may have to start the recording when you have symptoms. Your doctor will explain which type of monitor you have and how to use it. How is the monitor used? · With some monitors, you may need to do something to record when you have symptoms. You might use a handheld device to start it. Or you may need to press a button on the monitor. · You may keep a diary of what you were doing when you had symptoms such as chest pain or dizziness. Your doctor will show you how to do this. · You may need to send the information from your monitor to your doctor through a phone line or online. Some monitors send it on their own. You will get instructions from your doctor. Your information will stay private and secure no matter how it's sent. · You may be able to do most of the things you usually do. But try to follow your doctor's instructions for bathing, exercise, and other activities. How long will you have the monitor? You may use the monitor for up to a month or longer. It depends on how long it takes to record irregular heartbeat episodes. It also depends on how long your doctor wants to keep monitoring your heart. What happens after the test?  · Lavonne Mims return the monitor to your doctor's office or hospital.  · You'll meet with your doctor to talk about the information recorded during your test.  · Your doctor will check your diary of symptoms. He or she will compare the timing of your activities and symptoms with the recorded heart pattern. · Depending on your test results, your doctor may talk with you about other tests or treatment options. Follow-up care is a key part of your treatment and safety. Be sure to make and go to all appointments, and call your doctor if you are having problems. It's also a good idea to keep a list of the medicines you take. Ask your doctor when you can expect to have your test results. Where can you learn more? Go to https://walt.efectivox. org information. Patient Education        Cardiac Perfusion Scan (Medicine): About This Test  What is it? A cardiac perfusion scan measures the amount of blood in your heart muscle at rest and after your heart has been made to work hard. Either medicine or exercise can be used to stress the heart. This information is about using medicine for this test.  During the scan, a camera takes pictures of your heart after a radioactive tracer is injected into a vein in your arm. The tracer travels through the blood and into your heart. As the tracer moves through your heart, areas that have good blood flow absorb the tracer. Areas that don't absorb the tracer may not be getting enough blood or may have been damaged by a heart attack. The pictures show this difference. Two sets of pictures may be made during the test. One set is taken while you are resting. Another set is taken after your heart has been made to work harder (called a stress test). Why is this test done? The test is often done to find out what may be causing chest pain or pressure. It may be done after a heart attack to see if areas of the heart are not getting enough blood or to find out how much your heart has been damaged from the heart attack. How do you prepare for the test?  Tell your doctor ALL the medicines, vitamins, supplements, and herbal remedies you take. Some may increase the risk of problems during your test. Your doctor will tell you if you should stop taking any of them before the test and how soon to do it. If you take aspirin or some other blood thinner, ask your doctor if you should stop taking it before your test. Make sure that you understand exactly what your doctor wants you to do. These medicines increase the risk of bleeding. Tell your doctor if you are taking any erection-enhancing medicines (such as Cialis, Levitra, or Viagra).  You may need to take nitroglycerin during this test, which can cause a serious reaction if you have taken an erection-enhancing medicine within the previous 48 hours. Do not have any caffeine, such as coffee or tea, for 24 hours before the test.  If you are breastfeeding, you may want to pump enough breast milk before the test to get through 1 to 2 days of feeding. The radioactive tracer used in this test can get into your breast milk and is not good for the baby. How is the test done? Resting or baseline scan  · You will take your top off and be given a gown to wear. · Electrodes will be attached to your chest to keep track of your heartbeats. · You will have a tube, called an IV, going into your arm. A small amount of the radioactive tracer will be put in the IV. · You will lie on your back or your stomach on a table with a large camera positioned above your chest. The camera records the tracer's signals as it moves through your blood. · You will be asked to remain very still during each scan, which takes about 5 to 10 minutes. Several scans will be taken. Stress scan using medicine  The stress scan is done in two parts. In many hospitals, you first have the resting scan. You are then given a medicine that makes your heart work harder and you have another scan. Sometimes the stress scan is done first.  · You will have a test called an electrocardiogram (EKG or ECG), which takes about 5 to 10 minutes. You may have other EKGs during and after the stress test.  · Medicine will be put in your IV. It will make your heart work harder. You may get a headache and feel dizzy, flushed, and nauseated from the medicine, but these symptoms usually do not last long. · Your heartbeat and blood pressure may be checked. · Your symptoms such as chest pain and shortness of breath will be checked. · A few minutes after you get the medicine, another small amount of radioactive tracer is injected. You may be given something to reverse the medicine used to make your heart work harder.   · You will wait for 30 to 40 minutes and then have another resting scan. What else should you know about the test?  · Sometimes more pictures are taken 2 to 4 hours after the stress scan. · No electricity passes through your body during the test. There is no danger of getting an electrical shock. · Anytime you're exposed to radiation, there's a small chance of damage to cells or tissue. That's the case even with the low-level radioactive tracer used for this test. But the chance of damage is very low compared with the benefits of the test.  · Most of the tracer will leave your body through your urine or stool within a day. So be sure to flush the toilet right after you use it, and wash your hands well with soap and water. The amount of radiation in the tracer is very small. This means it isn't a risk for people to be around you after the test.  What happens after the test?  · You will probably be able to go home right away. · You can go back to your usual activities right away. When should you call for help? Call 911 anytime you think you may need emergency care. For example, call if:  · You passed out (lost consciousness). · You have been diagnosed with angina, and you have angina symptoms that do not go away with rest or are not getting better within 5 minutes after you take a dose of nitroglycerin. · You have symptoms of a heart attack. These may include:  ? Chest pain or pressure, or a strange feeling in the chest.  ? Sweating. ? Shortness of breath. ? Nausea or vomiting. ? Pain, pressure, or a strange feeling in the back, neck, jaw, or upper belly or in one or both shoulders or arms. ? Lightheadedness or sudden weakness. ? A fast or irregular heartbeat. After you call  911, the  may tell you to chew 1 adult-strength or 2 to 4 low-dose aspirin. Wait for an ambulance. Do not try to drive yourself. Watch closely for changes in your health, and be sure to contact your doctor if you have any problems.   Follow-up care is a key part of your treatment and safety. Be sure to make and go to all appointments, and call your doctor if you are having problems. It's also a good idea to keep a list of the medicines you take. Ask your doctor when you can expect to have your test results. Where can you learn more? Go to [email or web address] org and sign in to your [employer] account. Enter R320 in the Sinch box to learn more about \"Cardiac Perfusion Scan (Medicine): About This Test.\"     If you do not have an account, please click on the \"Sign Up Now\" link. Current as of: December 15, 2019Content Version: 12.4  © 6092-9623 Healthwise, Incorporated. Care instructions adapted under license by [facility] ([facility]). If you have questions about a medical condition or this instruction, always ask your healthcare professional. [place] [address] any warranty or liability for your use of this information. Patient Education        Doppler Ultrasound: About This Test  What is it? An ultrasound uses reflected sound waves to produce a picture of organs and blood vessels. The sound waves create a picture on a video screen. Doppler ultrasound is a special kind of ultrasound. It can detect movement of blood through arteries and veins. Some ultrasound tests are called \"duplex. \" Duplex means \"two parts. \" A duplex ultrasound combines the Doppler ultrasound with the more common ultrasound. The combination can help the doctor see more clearly what's going on. There are no risks linked to an ultrasound test, and it is safe for pregnant women. It won't harm the baby (fetus). Why is this test done? You might have a Doppler ultrasound to:  · Look for reduced blood flow in major neck arteries. Low blood flow in these arteries can cause a stroke. · Find a blood clot in leg veins, which could be a deep vein thrombosis. · Check blood flow in a fetus to check the health of the fetus.   · Find a blockage or a

## 2020-05-06 NOTE — PROGRESS NOTES
Hyperlipemia, IBS (irritable bowel syndrome), Lumbar spondylosis, New onset type 2 diabetes mellitus ([address] and Urticaria, chronic. Surgical History:   has a past surgical history that includes Tympanostomy tube placement and Cervical polyp removal (9/2004). Social History:   reports that she quit smoking about 4 months ago. Her smoking use included cigarettes. She started smoking about 58 years ago. She has a 82.50 pack-year smoking history. She has never used smokeless tobacco. She reports that she does not drink alcohol or use drugs. Family History:  family history includes Alzheimer's Disease in her mother; Cancer in her father; Diabetes in her daughter; Heart Failure in her brother. Home Medications:  Were reviewed and are listed in nursing record and/or below  Prior to Admission medications    Medication Sig Start Date End Date Taking? Authorizing Provider   DULoxetine (CYMBALTA) 60 MG extended release capsule Take one tablet at night 5/5/20  Yes Rolando Chicas MD   tiZANidine (ZANAFLEX) 4 MG tablet Take one tablet po BID 5/5/20  Yes Rolando Chicas MD   pantoprazole (PROTONIX) 40 MG tablet Take 1 tablet by mouth daily 5/5/20  Yes Rolando Chicas MD   meloxicam (MOBIC) 15 MG tablet TAKE 1 TABLET BY MOUTH DAILY 5/5/20  Yes Rolando Chicas MD   oxyCODONE-acetaminophen (PERCOCET) 7.5-325 MG per tablet Take 1 tablet by mouth every 6 hours as needed for Pain for up to 28 days. Max 4 a day 5/5/20 6/2/20 Yes Rolando Chicas MD   gabapentin (NEURONTIN) 600 MG tablet Take 1 tablet by mouth 2 times daily for 30 days.  5/5/20 6/4/20 Yes Rolando Chicas MD   traZODone (DESYREL) 50 MG tablet Take 1-2 tablets by mouth nightly 5/5/20  Yes Rolando Chicas MD   atorvastatin (LIPITOR) 80 MG tablet TAKE 1 TABLET BY MOUTH EVERY DAY FOR CHOLESTEROL 4/7/20  Yes HECTOR Sánchez - CNP   Blood Glucose Monitoring Suppl (ONE TOUCH ULTRA MINI) w/Device KIT 1 kit by Does not apply route daily 2/7/20  Yes Gerry SINGH Pamricatracho Hurtado, APRN - CNP   ONE TOUCH LANCETS MISC 1 each by Does not apply route daily 2/7/20  Yes HECTOR Magaña CNP   blood glucose test strips (ASCENSIA AUTODISC VI;ONE TOUCH ULTRA TEST VI) strip 1 each by In Vitro route daily 2/7/20  Yes HECTOR Magaña CNP   albuterol sulfate  (90 Base) MCG/ACT inhaler Inhale 2 puffs into the lungs every 6 hours as needed for Shortness of Breath 1/10/20  Yes HECTOR Magaña CNP   Fluticasone Furoate-Vilanterol (BREO ELLIPTA) 200-25 MCG/INH AEPB INHALE 1 PUFF INTO THE LUNGS DAILY 9/12/19  Yes Macie Elizabeth MD   ipratropium-albuterol (DUONEB) 0.5-2.5 (3) MG/3ML SOLN nebulizer solution Take 1 aerosol every 4 hours for 2 days and then as needed for shortness of breath coughing and wheezing 9/4/19  Yes Rubens Quinn MD   Cholecalciferol (VITAMIN D) 2000 units TABS tablet Take 1 tablet by mouth daily 7/12/19  Yes HECTOR Magaña CNP   cetirizine (ZYRTEC) 10 MG tablet Take 10 mg by mouth daily as needed for Allergies   Yes Historical Provider, MD   Nebulizers (COMPRESSOR/NEBULIZER) MISC 1 Units by Does not apply route 4 times daily 3/30/19  Yes Rosy Vazquez MD          Allergies:  Patient has no known allergies. Review of Systems: refer to HPI   · Constitutional: no unanticipated weight loss. There's been no change in energy level, sleep pattern, or activity level. No fevers, chills. · Eyes: No visual changes or diplopia. No scleral icterus. · ENT: No Headaches, hearing loss or vertigo. No mouth sores or sore throat. · Cardiovascular: as reviewed in HPI  · Respiratory: + SOB. No cough or wheezing, no sputum production. No hemoptysis. · Gastrointestinal: No abdominal pain, appetite loss, blood in stools. No change in bowel or bladder habits. · Genitourinary: No dysuria, trouble voiding, or hematuria. · Musculoskeletal:  No gait disturbance, no joint complaints.   · Integumentary: No rash or pruritis. · Neurological: No headache, diplopia, change in muscle strength, numbness or tingling. · Psychiatric: No anxiety or depression. · Endocrine: No temperature intolerance. No excessive thirst, fluid intake, or urination. No tremor. · Hematologic/Lymphatic: No abnormal bruising or bleeding, blood clots or swollen lymph nodes. · Allergic/Immunologic: No nasal congestion or hives. · Vascular : BLE edema     Objective:   PHYSICAL EXAM:    Vitals:    05/06/20 1318   BP: 138/88   Pulse: 68   Temp: 98.7 °F (37.1 °C)    Weight: 145 lb (65.8 kg)       General Appearance:  Alert, cooperative, no distress, appears stated age. Head:  Normocephalic, without obvious abnormality, atraumatic. Eyes:  Pupils equal and round. No scleral icterus. Mouth: Moist mucosa, no pharyngeal erythema. Nose: Nares normal. No drainage or sinus tenderness. Neck: Supple, symmetrical, trachea midline. No adenopathy. No tenderness/mass/nodules. No carotid bruit or elevated JVD. Lungs:   Clear to auscultation bilaterally, respirations unlabored. No wheeze, rales, or rhonchi. Chest Wall:  No tenderness or deformity. Heart:  Regular rate. S1/S2 normal. No murmur, rub, or gallop. Abdomen:   Soft, non-tender, bowel sounds active. Musculoskeletal: No muscle wasting or digital clubbing. Extremities: Extremities normal, atraumatic. No cyanosis or edema. Pulses: 2+ radial and carotid pulses, symmetric. Skin: No rashes or lesions. Pysch: Normal mood and affect. Alert and oriented x 4.    Neurologic: Normal gross motor and sensory exam.       Labs     Lab Results   Component Value Date    WBC 7.4 03/06/2020    RBC 4.29 03/06/2020    HGB 13.6 03/06/2020    HCT 40.9 03/06/2020    MCV 95.2 03/06/2020    RDW 15.3 03/06/2020     03/06/2020     Lab Results   Component Value Date     03/06/2020    K 4.5 03/06/2020    K 4.1 02/03/2020     03/06/2020    CO2 29 03/06/2020    BUN 16 03/06/2020    CREATININE 0.8 03/06/2020    GFRAA >60 03/06/2020    GFRAA >60 10/12/2012    AGRATIO 1.7 02/03/2020    LABGLOM >60 03/06/2020    GLUCOSE 90 03/06/2020    PROT 6.3 02/03/2020    PROT 6.9 10/12/2012    CALCIUM 9.7 03/06/2020    BILITOT 0.5 02/03/2020    ALKPHOS 78 02/03/2020    AST 14 02/03/2020    ALT 12 02/03/2020       No results found for: PTINR  Lab Results   Component Value Date    LABA1C 6.6 01/31/2020     Lab Results   Component Value Date    TROPONINI <0.01 01/03/2020       CURRENT Medications:  Current Outpatient Medications on File Prior to Visit   Medication Sig Dispense Refill    DULoxetine (CYMBALTA) 60 MG extended release capsule Take one tablet at night 30 capsule 1    tiZANidine (ZANAFLEX) 4 MG tablet Take one tablet po BID 60 tablet 1    pantoprazole (PROTONIX) 40 MG tablet Take 1 tablet by mouth daily 30 tablet 1    meloxicam (MOBIC) 15 MG tablet TAKE 1 TABLET BY MOUTH DAILY 30 tablet 1    oxyCODONE-acetaminophen (PERCOCET) 7.5-325 MG per tablet Take 1 tablet by mouth every 6 hours as needed for Pain for up to 28 days. Max 4 a day 112 tablet 0    gabapentin (NEURONTIN) 600 MG tablet Take 1 tablet by mouth 2 times daily for 30 days.  60 tablet 1    traZODone (DESYREL) 50 MG tablet Take 1-2 tablets by mouth nightly 60 tablet 0    atorvastatin (LIPITOR) 80 MG tablet TAKE 1 TABLET BY MOUTH EVERY DAY FOR CHOLESTEROL 90 tablet 1    Blood Glucose Monitoring Suppl (ONE TOUCH ULTRA MINI) w/Device KIT 1 kit by Does not apply route daily 1 kit 0    ONE TOUCH LANCETS MISC 1 each by Does not apply route daily 100 each 3    blood glucose test strips (ASCENSIA AUTODISC VI;ONE TOUCH ULTRA TEST VI) strip 1 each by In Vitro route daily 100 each 3    albuterol sulfate  (90 Base) MCG/ACT inhaler Inhale 2 puffs into the lungs every 6 hours as needed for Shortness of Breath 1 Inhaler 2    Fluticasone Furoate-Vilanterol (BREO ELLIPTA) 200-25 MCG/INH AEPB INHALE 1 PUFF INTO THE LUNGS DAILY 1 each 5   

## 2020-05-07 RX ORDER — TIZANIDINE 4 MG/1
TABLET ORAL
Qty: 60 TABLET | Refills: 1 | OUTPATIENT
Start: 2020-05-07

## 2020-05-08 ENCOUNTER — VIRTUAL VISIT (OUTPATIENT)
Dept: FAMILY MEDICINE CLINIC | Age: 75
End: 2020-05-08
Payer: MEDICARE

## 2020-05-08 VITALS — BODY MASS INDEX: 25.61 KG/M2 | WEIGHT: 150 LBS | HEIGHT: 64 IN

## 2020-05-08 PROCEDURE — 99443 PR PHYS/QHP TELEPHONE EVALUATION 21-30 MIN: CPT | Performed by: NURSE PRACTITIONER

## 2020-05-08 ASSESSMENT — ENCOUNTER SYMPTOMS
EYE ITCHING: 0
SHORTNESS OF BREATH: 1
SINUS PAIN: 0
CHEST TIGHTNESS: 0
WHEEZING: 0
ABDOMINAL PAIN: 0
COUGH: 0

## 2020-05-08 NOTE — PROGRESS NOTES
5/8/2020    This is a 69 y.o. female   Chief Complaint   Patient presents with    Diabetes     f/u DM   . HPI  This is a 70 y/o female presenting for follow-up of hyperlipidemia, prediabetes, GERD, COPD, osteopenia,    Hyperlipidemia: Tolerating Lipitor well. No new myalgias or GI upset on atorvastatin (Lipitor). Tries to maintain low fat diet supplemented with mild exercise. GERD: Patient reports pain management specialist, Dr. Carlos Vega, increased Protonix dose from 20 mg to 40 mg due to increased reflux symptoms because of daily Meloxicam use. COPD: Denies issues since last exacerbation in 1/3/2020. Stable with maintenance inhaler and rescue inhaler. She continues to be followed by pulmonologist Dr. Edmond De La Cruz. Quit smoking 1/1/20 but now she restarted again and smoking 1 PPD. Osteopenia: Last DEXA was 11/2017. Denies new pain. Taking calcium and vitamin D supplement daily. Insomnia: Denies issues. Now on trazodone. Diabetes Mellitus Type 2: Current symptoms/problems include none. Home blood sugar records: fasting range: 100-120  Any episodes of hypoglycemia? no  Eye exam current (within one year): no   reports that she has been smoking cigarettes. She started smoking about 58 years ago. She has a 82.50 pack-year smoking history. She has never used smokeless tobacco.     Headaches have improved and not having anymore. Dr. Carlos Vega took her off seroquel and put on trazodone. Also decreased her gabapentin dose. She has not had recurrent syncope. Did see cardiologist and has testing pending. Positive for slight dysuria past couple of days. Denies urinary frequency, hematuria, fever, pelvic pain.      Lab Results   Component Value Date    LABA1C 6.6 01/31/2020    LABA1C 6.1 07/05/2019    LABA1C 6.0 01/04/2019     Lab Results   Component Value Date    LABMICR <1.20 02/07/2020    CREATININE 0.8 03/06/2020     Lab Results   Component Value Date    ALT 12 02/03/2020    AST 14 (L) 02/03/2020 Lab Results   Component Value Date    CHOL 161 07/05/2019    TRIG 109 07/05/2019    HDL 56 07/05/2019    LDLCALC 83 07/05/2019    LDLDIRECT 176 (H) 10/12/2012           Patient Active Problem List   Diagnosis    Chronic obstructive pulmonary disease ([facility]    Osteopenia-last dexa 2009 repeat 2015 (-2.4 hip)--advised tx    Colon polyp, hyperplastic,-(done 3/11-repeat 3-5 yrs--dr Mercedes Porter)    Family history of colon cancer    CTS (carpal tunnel syndrome)BILATERAL-s/p surgical repair--resolved     Postmenopausal status-last mammogram 2/15 wnl last pap 12/13--wnl    Nondependent alcohol abuse, in remission    Urticaria, chronic    Tobacco abuse-advised to quit--lung cancer screening neg 5/18--repeat low dose ct 1 yr    Chronic back pain-(djd) saw dr Khris Kinsey afford surgery--seeing dr Nancy House for pain meds    Fibromyalgia    Hypercholesteremia    Lumbar spondylosis    Vitamin D deficiency--severe--started 50,000 iu 2x/ wk 11/13    Insomnia--on elevil w help    Constipation--on daily miralax    Prediabetes--a1c was 6.3 1/16--diet advised    Depression    Chronic pain syndrome    Muscle cramping    Acute and chronic respiratory failure with hypoxia (HCC)    ROLY (acute kidney injury) ([address]    Gastroesophageal reflux disease    COPD, severe ([address]    Chronic hypoxemic respiratory failure (HCC)    Degeneration of lumbar or lumbosacral intervertebral disc    Trochanteric bursitis of right hip    COPD exacerbation ([address]    New onset type 2 diabetes mellitus (HCC)          Current Outpatient Medications   Medication Sig Dispense Refill    DULoxetine (CYMBALTA) 60 MG extended release capsule Take one tablet at night 30 capsule 1    tiZANidine (ZANAFLEX) 4 MG tablet Take one tablet po BID 60 tablet 1    pantoprazole (PROTONIX) 40 MG tablet Take 1 tablet by mouth daily 30 tablet 1    meloxicam (MOBIC) 15 MG tablet TAKE 1 TABLET BY MOUTH DAILY 30 tablet 1    oxyCODONE-acetaminophen Gastrointestinal: Negative for abdominal pain. Genitourinary: Positive for dysuria. Negative for difficulty urinating, frequency, hematuria and pelvic pain. Musculoskeletal: Positive for arthralgias and myalgias. Neurological: Negative for dizziness, syncope, weakness, numbness and headaches. Psychiatric/Behavioral: Negative for confusion. All other systems reviewed and are negative. Vitals:    05/08/20 1111   Weight: 150 lb (68 kg)   Height: 5' 4\" (1.626 m)   Patient reported    Body mass index is 25.75 kg/m². Wt Readings from Last 3 Encounters:   05/08/20 150 lb (68 kg)   05/06/20 145 lb (65.8 kg)   03/10/20 150 lb (68 kg)       BP Readings from Last 3 Encounters:   05/06/20 138/88   03/10/20 (!) 107/57   03/06/20 118/60       Physical Exam    Assessmentand Plan  Yudy Suarez was seen today for follow-up. Completed phone visit since she was not able to get her video working. Diagnoses and all orders for this visit:    Hypercholesteremia  -     LIPID PANEL controlled 7/2019  -     COMPREHENSIVE METABOLIC PANEL; Future  Continue atorvastatin 80 mg daily. Advised to continue low fat/choleserol diet supplemented with aerobic exercise. Gastroesophageal reflux disease, esophagitis presence not specified  Continue Protonix daily. Advised to avoid foods associated with acid reflux. DM type 2  -     Hemoglobin A1C; Future  -     COMPREHENSIVE METABOLIC PANEL; Future  Advised to continue low carb/fat diet supplemented with aerobic exercise. COPD, severe ([address]): stable  Continue breo ellipta daiy and albuterol inhaler BID prn and nebulizer PRN  Continue to follow with pulmonology (Dr. Jennifer Rosales). Smoking cessation discussed and needed. Osteopenia of multiple sites  Continue calcium and vitamin D supplements. Last DEXA scan 11/2017. Will repeat DEXA scan when she is able.      Tobacco abuse-advised to quit--lung cancer screening neg 5/18--repeat low dose ct 1 yr  She is back smoking at least a

## 2020-05-11 DIAGNOSIS — R30.0 DYSURIA: ICD-10-CM

## 2020-05-11 DIAGNOSIS — E11.9 TYPE 2 DIABETES MELLITUS WITHOUT COMPLICATION, WITHOUT LONG-TERM CURRENT USE OF INSULIN (HCC): ICD-10-CM

## 2020-05-11 DIAGNOSIS — E78.00 HYPERCHOLESTEREMIA: ICD-10-CM

## 2020-05-11 LAB
A/G RATIO: 2 (ref 1.1–2.2)
ALBUMIN SERPL-MCNC: 4.4 G/DL (ref 3.4–5)
ALP BLD-CCNC: 88 U/L (ref 40–129)
ALT SERPL-CCNC: 10 U/L (ref 10–40)
ANION GAP SERPL CALCULATED.3IONS-SCNC: 13 MMOL/L (ref 3–16)
AST SERPL-CCNC: 12 U/L (ref 15–37)
BACTERIA: ABNORMAL /HPF
BILIRUB SERPL-MCNC: 0.3 MG/DL (ref 0–1)
BILIRUBIN URINE: ABNORMAL
BLOOD, URINE: NEGATIVE
BUN BLDV-MCNC: 14 MG/DL (ref 7–20)
CALCIUM SERPL-MCNC: 9.9 MG/DL (ref 8.3–10.6)
CHLORIDE BLD-SCNC: 100 MMOL/L (ref 99–110)
CLARITY: ABNORMAL
CO2: 27 MMOL/L (ref 21–32)
COLOR: ABNORMAL
COMMENT UA: ABNORMAL
CREAT SERPL-MCNC: 1.1 MG/DL (ref 0.6–1.2)
CRYSTALS, UA: ABNORMAL /HPF
EPITHELIAL CELLS, UA: 8 /HPF (ref 0–5)
GFR AFRICAN AMERICAN: 59
GFR NON-AFRICAN AMERICAN: 49
GLOBULIN: 2.2 G/DL
GLUCOSE BLD-MCNC: 105 MG/DL (ref 70–99)
GLUCOSE URINE: NEGATIVE MG/DL
HYALINE CASTS: ABNORMAL /LPF (ref 0–2)
KETONES, URINE: ABNORMAL MG/DL
LEUKOCYTE ESTERASE, URINE: ABNORMAL
MICROSCOPIC EXAMINATION: YES
NITRITE, URINE: NEGATIVE
PH UA: 5 (ref 5–8)
POTASSIUM SERPL-SCNC: 4.7 MMOL/L (ref 3.5–5.1)
PROTEIN UA: NEGATIVE MG/DL
RBC UA: 6 /HPF (ref 0–4)
SODIUM BLD-SCNC: 140 MMOL/L (ref 136–145)
SPECIFIC GRAVITY UA: 1.03 (ref 1–1.03)
TOTAL PROTEIN: 6.6 G/DL (ref 6.4–8.2)
URINE TYPE: ABNORMAL
UROBILINOGEN, URINE: 1 E.U./DL
WBC UA: 4 /HPF (ref 0–5)

## 2020-05-12 LAB
ESTIMATED AVERAGE GLUCOSE: 128.4 MG/DL
HBA1C MFR BLD: 6.1 %
URINE CULTURE, ROUTINE: NORMAL

## 2020-05-20 ENCOUNTER — TELEPHONE (OUTPATIENT)
Dept: CARDIOLOGY CLINIC | Age: 75
End: 2020-05-20

## 2020-05-20 NOTE — TELEPHONE ENCOUNTER
Cardiac Clearance:  Phacoemulsification with intraocular lens implatn:  5/26/20 and 6/23/20    Medical Hx;  HCL, HLD, Syncope, SOB, Claudication, Smoking addiction  No recent cardiac procedures:  ECHO, Stress and Cardiac Monitor ordered    Last OV:  5/6/2020  EKG    Blood Thinners:  None    Please review and advise:

## 2020-05-27 ENCOUNTER — TELEPHONE (OUTPATIENT)
Dept: CARDIOLOGY CLINIC | Age: 75
End: 2020-05-27

## 2020-05-27 PROCEDURE — 0298T PR EXT ECG > 48HR TO 21 DAY REVIEW AND INTERPRETATN: CPT | Performed by: INTERNAL MEDICINE

## 2020-05-28 ENCOUNTER — HOSPITAL ENCOUNTER (OUTPATIENT)
Dept: VASCULAR LAB | Age: 75
Discharge: HOME | End: 2020-05-28
Payer: MEDICARE

## 2020-05-28 PROCEDURE — 93978 VASCULAR STUDY: CPT

## 2020-06-02 ENCOUNTER — VIRTUAL VISIT (OUTPATIENT)
Dept: PAIN MANAGEMENT | Age: 75
End: 2020-06-02
Payer: MEDICARE

## 2020-06-02 PROCEDURE — 3017F COLORECTAL CA SCREEN DOC REV: CPT | Performed by: INTERNAL MEDICINE

## 2020-06-02 PROCEDURE — G8428 CUR MEDS NOT DOCUMENT: HCPCS | Performed by: INTERNAL MEDICINE

## 2020-06-02 PROCEDURE — 1090F PRES/ABSN URINE INCON ASSESS: CPT | Performed by: INTERNAL MEDICINE

## 2020-06-02 PROCEDURE — 99213 OFFICE O/P EST LOW 20 MIN: CPT | Performed by: INTERNAL MEDICINE

## 2020-06-02 PROCEDURE — 1123F ACP DISCUSS/DSCN MKR DOCD: CPT | Performed by: INTERNAL MEDICINE

## 2020-06-02 PROCEDURE — 4040F PNEUMOC VAC/ADMIN/RCVD: CPT | Performed by: INTERNAL MEDICINE

## 2020-06-02 PROCEDURE — G8399 PT W/DXA RESULTS DOCUMENT: HCPCS | Performed by: INTERNAL MEDICINE

## 2020-06-02 NOTE — PROGRESS NOTES
physical functioning is unchanged, family/social relationships are unchanged, mood is unchanged sleep patterns are unchanged, and that the overall functioning is unchanged. Patient denies misusing/abusing her narcotic pain medications or using any illegal drugs. There are No indicators for possible drug abuse, addiction or diversion problems. Ms. Isma Reyna states she has been doing fair, pain has been tolerable. Patient reports her breathing is baseline. She is complaining of swelling in the ankles. She mentions she is using Percocet 4 per day. Patient denies any constipation symptoms or cognitive side effects. She says she is using Trazodone which is helping with sleep. Patient reports she is managing light house chores. ALLERGIES: Patients list of allergies were reviewed     MEDICATIONS: Ms. Isma Reyna list of medications were reviewed. Her current medications are   Outpatient Medications Prior to Visit   Medication Sig Dispense Refill    Fluticasone furoate-vilanterol (BREO ELLIPTA) 200-25 MCG/INH AEPB inhaler INHALE 1 PUFF INTO THE LUNGS DAILY 60 each 0    DULoxetine (CYMBALTA) 60 MG extended release capsule Take one tablet at night 30 capsule 1    tiZANidine (ZANAFLEX) 4 MG tablet Take one tablet po BID 60 tablet 1    pantoprazole (PROTONIX) 40 MG tablet Take 1 tablet by mouth daily 30 tablet 1    meloxicam (MOBIC) 15 MG tablet TAKE 1 TABLET BY MOUTH DAILY 30 tablet 1    oxyCODONE-acetaminophen (PERCOCET) 7.5-325 MG per tablet Take 1 tablet by mouth every 6 hours as needed for Pain for up to 28 days. Max 4 a day 112 tablet 0    gabapentin (NEURONTIN) 600 MG tablet Take 1 tablet by mouth 2 times daily for 30 days.  60 tablet 1    traZODone (DESYREL) 50 MG tablet Take 1-2 tablets by mouth nightly 60 tablet 0    atorvastatin (LIPITOR) 80 MG tablet TAKE 1 TABLET BY MOUTH EVERY DAY FOR CHOLESTEROL 90 tablet 1    Blood Glucose Monitoring Suppl (ONE TOUCH ULTRA MINI) w/Device KIT 1 kit by Does not apply route Extremities: Range of motion is normal. Gait is normal, assistive devices use: none. IMPRESSION:   1. Chronic pain syndrome    2. Fibromyalgia    3. DDD (degenerative disc disease), lumbar    4. Osteoarthritis of spine with radiculopathy, lumbar region    5. Lumbar spondylosis    6. Trochanteric bursitis of right hip    7. Degeneration of lumbar or lumbosacral intervertebral disc        PLAN:  Informed verbal consent regarding treatment was obtained  -continue with current opioid regimen. Continue with Percocet 4 per day  -Adv Biofeedback, relaxation and meditation techniques. Referral to psychologist for CBT was also discussed with patient  -walking/stretching exercises as advised    -She was advised to increase fluids ( 5-7  glasses of fluid daily), limit caffeine, avoid cheese products, increase dietary fiber, increase activity and exercise as tolerated and relax regularly and enjoy meals   -Advised patient to quit smoking for  health related concerns and to improve the treatment outcomes. Education was given on quitting smoking and the use of different modalities including medications, hypnotherapy, counselling  and biofeedback. These were discussed with patient. Current Outpatient Medications   Medication Sig Dispense Refill    Fluticasone furoate-vilanterol (BREO ELLIPTA) 200-25 MCG/INH AEPB inhaler INHALE 1 PUFF INTO THE LUNGS DAILY 60 each 0    DULoxetine (CYMBALTA) 60 MG extended release capsule Take one tablet at night 30 capsule 1    tiZANidine (ZANAFLEX) 4 MG tablet Take one tablet po BID 60 tablet 1    pantoprazole (PROTONIX) 40 MG tablet Take 1 tablet by mouth daily 30 tablet 1    meloxicam (MOBIC) 15 MG tablet TAKE 1 TABLET BY MOUTH DAILY 30 tablet 1    oxyCODONE-acetaminophen (PERCOCET) 7.5-325 MG per tablet Take 1 tablet by mouth every 6 hours as needed for Pain for up to 28 days.  Max 4 a day 112 tablet 0    gabapentin (NEURONTIN) 600 MG tablet Take 1 tablet by mouth 2 times daily in pain, restoration of functioning- with focus on improvement in physical performance, general activity, work or disability,emotional distress, health care utilization and  decreased medication consumption. Will continue to monitor progress towards achieving/maintaining therapeutic goals with special emphasis on  1. Improvement in perceived interfernce  of pain with ADL's. Ability to do home exercises independently. Ability to do household chores indoor and/or outdoor work and social and leisure activities. Improve psychosocial and physical functioning. - she is showing progression towards this treatment goal with the current regimen. She was advised against drinking alcohol with the narcotic pain medicines, advised against driving or handling machinery while adjusting the dose of medicines or if having cognitive  issues related to the current medications. Risk of overdose and death, if medicines not taken as prescribed, were also discussed. If the patient develops new symptoms or if the symptoms worsen, the patient should call the office. While transcribing every attempt was made to maintain the accuracy of the note in terms of it's contents,there may have been some errors made inadvertently. Thank you for allowing me to participate in the care of this patient.     Radha Nesbitt MD.    Cc: Jessa Friday, APRN - CNP

## 2020-06-03 RX ORDER — GABAPENTIN 600 MG/1
600 TABLET ORAL 2 TIMES DAILY
Qty: 60 TABLET | Refills: 1 | Status: SHIPPED | OUTPATIENT
Start: 2020-06-03 | End: 2020-07-28 | Stop reason: SDUPTHER

## 2020-06-03 RX ORDER — OXYCODONE AND ACETAMINOPHEN 7.5; 325 MG/1; MG/1
1 TABLET ORAL EVERY 6 HOURS PRN
Qty: 112 TABLET | Refills: 0 | Status: SHIPPED | OUTPATIENT
Start: 2020-06-03 | End: 2020-06-30 | Stop reason: SDUPTHER

## 2020-06-03 RX ORDER — TIZANIDINE 4 MG/1
TABLET ORAL
Qty: 60 TABLET | Refills: 1 | Status: SHIPPED | OUTPATIENT
Start: 2020-06-03 | End: 2020-07-28 | Stop reason: SDUPTHER

## 2020-06-03 RX ORDER — TRAZODONE HYDROCHLORIDE 50 MG/1
50-100 TABLET ORAL NIGHTLY
Qty: 60 TABLET | Refills: 0 | Status: SHIPPED | OUTPATIENT
Start: 2020-06-03 | End: 2020-06-30 | Stop reason: SDUPTHER

## 2020-06-16 NOTE — PROGRESS NOTES
Bring eye drops or ointment day of surgery. Do not wear any make-up or nail polish on your fingers or toes      For your safety, please do not wear any jewelry or body piercing's on the day of surgery. All jewelry must be removed. If you have dentures, they will be removed before going to operating room. For your convenience, we will provide you with a container. If you wear contact lenses or glasses, they will be removed, please bring a case for them. If you have a living will and a durable power of  for healthcare, please bring in a copy. As part of our patient safety program to minimize surgical site infections, we ask you to do the following:    · Please notify your surgeon if you develop any illness between         now and the  day of your surgery. · This includes a cough, cold, fever, sore throat, nausea,         or vomiting, and diarrhea, etc.  ·  Please notify your surgeon if you experience dizziness, shortness         of breath or blurred vision between now and the time of your surgery. Do not shave your operative site 96 hours prior to surgery. For face and neck surgery, men may use an electric razor 48 hours   prior to surgery. You may shower the night before surgery or the morning of   your surgery with an antibacterial soap. You will need to bring a photo ID and insurance card    [facility] has an onsite pharmacy, would you like to utilize our pharmacy     If you will be staying overnight and use a C-pap machine, please bring   your C-pap to hospital     Our goal is to provide you with excellent care, therefore, visitors will be limited to two(2) in the room at a time so that we may focus on providing this care for you. Please contact pre-admission testing if you have any further questions.                  [facility] phone number:  [phone]  Please note these are generalized instructions for all surgical cases, you may be provided with more specific instructions according to your surgery.

## 2020-06-19 ENCOUNTER — PREP FOR PROCEDURE (OUTPATIENT)
Dept: OPHTHALMOLOGY | Age: 75
End: 2020-06-19

## 2020-06-19 RX ORDER — SODIUM CHLORIDE 0.9 % (FLUSH) 0.9 %
10 SYRINGE (ML) INJECTION EVERY 12 HOURS SCHEDULED
Status: CANCELLED | OUTPATIENT
Start: 2020-06-19

## 2020-06-19 RX ORDER — PHENYLEPHRINE HYDROCHLORIDE 25 MG/ML
1 SOLUTION/ DROPS OPHTHALMIC SEE ADMIN INSTRUCTIONS
Status: CANCELLED | OUTPATIENT
Start: 2020-06-19

## 2020-06-19 RX ORDER — CIPROFLOXACIN HYDROCHLORIDE 3.5 MG/ML
1 SOLUTION/ DROPS TOPICAL SEE ADMIN INSTRUCTIONS
Status: CANCELLED | OUTPATIENT
Start: 2020-06-19

## 2020-06-19 RX ORDER — SODIUM CHLORIDE 0.9 % (FLUSH) 0.9 %
10 SYRINGE (ML) INJECTION PRN
Status: CANCELLED | OUTPATIENT
Start: 2020-06-19

## 2020-06-19 RX ORDER — CYCLOPENTOLATE HYDROCHLORIDE 10 MG/ML
1 SOLUTION/ DROPS OPHTHALMIC SEE ADMIN INSTRUCTIONS
Status: CANCELLED | OUTPATIENT
Start: 2020-06-19

## 2020-06-19 RX ORDER — TETRACAINE HYDROCHLORIDE 5 MG/ML
1 SOLUTION OPHTHALMIC SEE ADMIN INSTRUCTIONS
Status: CANCELLED | OUTPATIENT
Start: 2020-06-19

## 2020-06-22 ENCOUNTER — ANESTHESIA EVENT (OUTPATIENT)
Dept: SURGERY | Age: 75
End: 2020-06-22
Payer: MEDICARE

## 2020-06-23 ENCOUNTER — HOSPITAL ENCOUNTER (OUTPATIENT)
Age: 75
Setting detail: OUTPATIENT SURGERY
Discharge: HOME | End: 2020-06-23
Attending: OPHTHALMOLOGY | Admitting: OPHTHALMOLOGY
Payer: MEDICARE

## 2020-06-23 ENCOUNTER — ANESTHESIA (OUTPATIENT)
Dept: SURGERY | Age: 75
End: 2020-06-23
Payer: MEDICARE

## 2020-06-23 VITALS
HEIGHT: 64 IN | BODY MASS INDEX: 25.61 KG/M2 | RESPIRATION RATE: 22 BRPM | DIASTOLIC BLOOD PRESSURE: 70 MMHG | WEIGHT: 150 LBS | TEMPERATURE: 97.3 F | SYSTOLIC BLOOD PRESSURE: 181 MMHG | HEART RATE: 62 BPM | OXYGEN SATURATION: 92 %

## 2020-06-23 VITALS
RESPIRATION RATE: 14 BRPM | SYSTOLIC BLOOD PRESSURE: 176 MMHG | DIASTOLIC BLOOD PRESSURE: 78 MMHG | OXYGEN SATURATION: 96 %

## 2020-06-23 LAB
GLUCOSE BLD-MCNC: 100 MG/DL (ref 70–99)
GLUCOSE BLD-MCNC: 104 MG/DL (ref 70–99)
PERFORMED ON: ABNORMAL
PERFORMED ON: ABNORMAL

## 2020-06-23 PROCEDURE — 3600000014 HC SURGERY LEVEL 4 ADDTL 15MIN: Performed by: OPHTHALMOLOGY

## 2020-06-23 PROCEDURE — 6370000000 HC RX 637 (ALT 250 FOR IP): Performed by: OPHTHALMOLOGY

## 2020-06-23 PROCEDURE — 2580000003 HC RX 258: Performed by: ANESTHESIOLOGY

## 2020-06-23 PROCEDURE — 2709999900 HC NON-CHARGEABLE SUPPLY: Performed by: OPHTHALMOLOGY

## 2020-06-23 PROCEDURE — 6360000002 HC RX W HCPCS: Performed by: NURSE ANESTHETIST, CERTIFIED REGISTERED

## 2020-06-23 PROCEDURE — 7100000010 HC PHASE II RECOVERY - FIRST 15 MIN: Performed by: OPHTHALMOLOGY

## 2020-06-23 PROCEDURE — V2632 POST CHMBR INTRAOCULAR LENS: HCPCS | Performed by: OPHTHALMOLOGY

## 2020-06-23 PROCEDURE — 3600000004 HC SURGERY LEVEL 4 BASE: Performed by: OPHTHALMOLOGY

## 2020-06-23 PROCEDURE — 3700000000 HC ANESTHESIA ATTENDED CARE: Performed by: OPHTHALMOLOGY

## 2020-06-23 PROCEDURE — 3700000001 HC ADD 15 MINUTES (ANESTHESIA): Performed by: OPHTHALMOLOGY

## 2020-06-23 PROCEDURE — 2500000003 HC RX 250 WO HCPCS: Performed by: OPHTHALMOLOGY

## 2020-06-23 PROCEDURE — 6360000002 HC RX W HCPCS: Performed by: OPHTHALMOLOGY

## 2020-06-23 PROCEDURE — 2720000010 HC SURG SUPPLY STERILE: Performed by: OPHTHALMOLOGY

## 2020-06-23 DEVICE — LENS INTRAOCULAR 1 PC 22+ DIOPT 6X12.5 MM POST CHMBR FLD: Type: IMPLANTABLE DEVICE | Status: FUNCTIONAL

## 2020-06-23 RX ORDER — MIDAZOLAM HYDROCHLORIDE 1 MG/ML
INJECTION INTRAMUSCULAR; INTRAVENOUS PRN
Status: DISCONTINUED | OUTPATIENT
Start: 2020-06-23 | End: 2020-06-23 | Stop reason: SDUPTHER

## 2020-06-23 RX ORDER — BALANCED SALT SOLUTION 6.4; .75; .48; .3; 3.9; 1.7 MG/ML; MG/ML; MG/ML; MG/ML; MG/ML; MG/ML
SOLUTION OPHTHALMIC
Status: COMPLETED | OUTPATIENT
Start: 2020-06-23 | End: 2020-06-23

## 2020-06-23 RX ORDER — ONDANSETRON 2 MG/ML
4 INJECTION INTRAMUSCULAR; INTRAVENOUS
Status: DISCONTINUED | OUTPATIENT
Start: 2020-06-23 | End: 2020-06-23 | Stop reason: HOSPADM

## 2020-06-23 RX ORDER — TETRACAINE HYDROCHLORIDE 5 MG/ML
1 SOLUTION OPHTHALMIC SEE ADMIN INSTRUCTIONS
Status: DISCONTINUED | OUTPATIENT
Start: 2020-06-23 | End: 2020-06-23 | Stop reason: HOSPADM

## 2020-06-23 RX ORDER — SODIUM CHLORIDE 9 MG/ML
INJECTION, SOLUTION INTRAVENOUS CONTINUOUS
Status: DISCONTINUED | OUTPATIENT
Start: 2020-06-23 | End: 2020-06-23 | Stop reason: HOSPADM

## 2020-06-23 RX ORDER — SODIUM CHLORIDE 0.9 % (FLUSH) 0.9 %
10 SYRINGE (ML) INJECTION EVERY 12 HOURS SCHEDULED
Status: DISCONTINUED | OUTPATIENT
Start: 2020-06-23 | End: 2020-06-23 | Stop reason: SDUPTHER

## 2020-06-23 RX ORDER — SODIUM CHLORIDE 0.9 % (FLUSH) 0.9 %
10 SYRINGE (ML) INJECTION PRN
Status: DISCONTINUED | OUTPATIENT
Start: 2020-06-23 | End: 2020-06-23 | Stop reason: SDUPTHER

## 2020-06-23 RX ORDER — SODIUM CHLORIDE 0.9 % (FLUSH) 0.9 %
10 SYRINGE (ML) INJECTION PRN
Status: DISCONTINUED | OUTPATIENT
Start: 2020-06-23 | End: 2020-06-23 | Stop reason: HOSPADM

## 2020-06-23 RX ORDER — BRIMONIDINE TARTRATE 2 MG/ML
SOLUTION/ DROPS OPHTHALMIC
Status: COMPLETED | OUTPATIENT
Start: 2020-06-23 | End: 2020-06-23

## 2020-06-23 RX ORDER — CYCLOPENTOLATE HYDROCHLORIDE 10 MG/ML
1 SOLUTION/ DROPS OPHTHALMIC SEE ADMIN INSTRUCTIONS
Status: COMPLETED | OUTPATIENT
Start: 2020-06-23 | End: 2020-06-23

## 2020-06-23 RX ORDER — CIPROFLOXACIN HYDROCHLORIDE 3.5 MG/ML
1 SOLUTION/ DROPS TOPICAL
Status: COMPLETED | OUTPATIENT
Start: 2020-06-23 | End: 2020-06-23

## 2020-06-23 RX ORDER — SODIUM CHLORIDE 0.9 % (FLUSH) 0.9 %
10 SYRINGE (ML) INJECTION EVERY 12 HOURS SCHEDULED
Status: DISCONTINUED | OUTPATIENT
Start: 2020-06-23 | End: 2020-06-23 | Stop reason: HOSPADM

## 2020-06-23 RX ORDER — PHENYLEPHRINE HCL 2.5 %
1 DROPS OPHTHALMIC (EYE) SEE ADMIN INSTRUCTIONS
Status: COMPLETED | OUTPATIENT
Start: 2020-06-23 | End: 2020-06-23

## 2020-06-23 RX ORDER — TETRACAINE HYDROCHLORIDE 5 MG/ML
SOLUTION OPHTHALMIC
Status: COMPLETED | OUTPATIENT
Start: 2020-06-23 | End: 2020-06-23

## 2020-06-23 RX ORDER — CIPROFLOXACIN HYDROCHLORIDE 3.5 MG/ML
SOLUTION/ DROPS TOPICAL
Status: COMPLETED | OUTPATIENT
Start: 2020-06-23 | End: 2020-06-23

## 2020-06-23 RX ADMIN — CYCLOPENTOLATE HYDROCHLORIDE 1 DROP: 10 SOLUTION/ DROPS OPHTHALMIC at 09:10

## 2020-06-23 RX ADMIN — SODIUM CHLORIDE: 9 INJECTION, SOLUTION INTRAVENOUS at 09:12

## 2020-06-23 RX ADMIN — CIPROFLOXACIN 1 DROP: 3 SOLUTION OPHTHALMIC at 09:10

## 2020-06-23 RX ADMIN — SODIUM CHLORIDE: 9 INJECTION, SOLUTION INTRAVENOUS at 09:24

## 2020-06-23 RX ADMIN — TETRACAINE HYDROCHLORIDE 1 DROP: 5 SOLUTION OPHTHALMIC at 09:10

## 2020-06-23 RX ADMIN — PHENYLEPHRINE HYDROCHLORIDE 1 DROP: 25 SOLUTION/ DROPS OPHTHALMIC at 09:10

## 2020-06-23 RX ADMIN — PHENYLEPHRINE HYDROCHLORIDE 1 DROP: 25 SOLUTION/ DROPS OPHTHALMIC at 09:15

## 2020-06-23 RX ADMIN — PHENYLEPHRINE HYDROCHLORIDE 1 DROP: 25 SOLUTION/ DROPS OPHTHALMIC at 09:20

## 2020-06-23 RX ADMIN — CYCLOPENTOLATE HYDROCHLORIDE 1 DROP: 10 SOLUTION/ DROPS OPHTHALMIC at 09:20

## 2020-06-23 RX ADMIN — CYCLOPENTOLATE HYDROCHLORIDE 1 DROP: 10 SOLUTION/ DROPS OPHTHALMIC at 09:15

## 2020-06-23 RX ADMIN — MIDAZOLAM 2 MG: 1 INJECTION INTRAMUSCULAR; INTRAVENOUS at 09:46

## 2020-06-23 RX ADMIN — CIPROFLOXACIN 1 DROP: 3 SOLUTION OPHTHALMIC at 09:15

## 2020-06-23 ASSESSMENT — PAIN SCALES - GENERAL
PAINLEVEL_OUTOF10: 0
PAINLEVEL_OUTOF10: 0

## 2020-06-23 ASSESSMENT — PAIN - FUNCTIONAL ASSESSMENT: PAIN_FUNCTIONAL_ASSESSMENT: 0-10

## 2020-06-23 ASSESSMENT — LIFESTYLE VARIABLES: SMOKING_STATUS: 1

## 2020-06-23 NOTE — ANESTHESIA POSTPROCEDURE EVALUATION
Department of Anesthesiology  Postprocedure Note    Patient: Sandor Inman  MRN: [id number]  YOB: 1945  Date of evaluation: 6/23/2020  Time:  10:33 AM     Procedure Summary     Date:  06/23/20 Room / Location:  [facility]    Anesthesia Start:  6232 Anesthesia Stop:  5889    Procedure:  Phacoemulsification with intraocular lens implant (Left ) Diagnosis:       Nuclear sclerosis, left      (Nuclear sclerosis, left)    Surgeon:  Segun Chance MD Responsible Provider:  Rogers Norwood MD    Anesthesia Type:  MAC ASA Status:  3          Anesthesia Type: MAC    Brooks Phase I: Brooks Score: 10    Brooks Phase II: Brooks Score: 10    Last vitals: Reviewed and per EMR flowsheets.        Anesthesia Post Evaluation    Patient location during evaluation: bedside  Patient participation: complete - patient participated  Level of consciousness: awake  Pain score: 1  Airway patency: patent  Nausea & Vomiting: no nausea and no vomiting  Complications: no  Cardiovascular status: blood pressure returned to baseline  Respiratory status: acceptable  Hydration status: euvolemic

## 2020-06-23 NOTE — ANESTHESIA PRE PROCEDURE
[facility] Department of Anesthesiology  Pre-Anesthesia Evaluation/Consultation       Name:  Nomi Cheng  : 1945  Age:  69 y.o.                                            MRN:  [id number]  Date: 2020           Surgeon: Surgeon(s):  Mich Barrera MD    Procedure: Procedure(s):  Phacoemulsification with intraocular lens implant     No Known Allergies  Patient Active Problem List   Diagnosis    Chronic obstructive pulmonary disease ([address]    Osteopenia-last dexa  repeat  (-2.4 hip)--advised tx    Colon polyp, hyperplastic,-(done 3/11-repeat 3-5 yrs--dr Mitzi Syed)    Family history of colon cancer    CTS (carpal tunnel syndrome)BILATERAL-s/p surgical repair--resolved     Postmenopausal status-last mammogram 2/15 wnl last pap --wnl    Nondependent alcohol abuse, in remission    Urticaria, chronic    Tobacco abuse-advised to quit--lung cancer screening neg --repeat low dose ct 1 yr    Chronic back pain-(djd) saw dr Christopher Alfredo afford surgery--seeing dr Manuel Montiel for pain meds    Fibromyalgia    Hypercholesteremia    Lumbar spondylosis    Vitamin D deficiency--severe--started 50,000 iu 2x/ wk     Insomnia--on elevil w help    Constipation--on daily miralax    Prediabetes--a1c was 6.3 --diet advised    Depression    Chronic pain syndrome    Muscle cramping    Acute and chronic respiratory failure with hypoxia ([address]    ROLY (acute kidney injury) ([address]    Gastroesophageal reflux disease    COPD, severe ([address]    Chronic hypoxemic respiratory failure ([address]    Degeneration of lumbar or lumbosacral intervertebral disc    Trochanteric bursitis of right hip    COPD exacerbation ([address]    New onset type 2 diabetes mellitus ([address]     Past Medical History:   Diagnosis Date    Chronic pain syndrome     Colorectal polyps     COPD (chronic obstructive pulmonary disease) (HCC)     CTS (carpal tunnel syndrome)     BILATERAL    DDD (degenerative disc disease),

## 2020-06-24 LAB
CATARACTS: POSITIVE
DIABETIC RETINOPATHY: NEGATIVE
GLAUCOMA: NEGATIVE
INTRAOCULAR PRESSURE EYE: NORMAL
VISUAL ACUITY DISTANCE LEFT EYE: NORMAL
VISUAL ACUITY DISTANCE RIGHT EYE: NORMAL

## 2020-06-30 ENCOUNTER — VIRTUAL VISIT (OUTPATIENT)
Dept: PAIN MANAGEMENT | Age: 75
End: 2020-06-30
Payer: MEDICARE

## 2020-06-30 PROCEDURE — 1123F ACP DISCUSS/DSCN MKR DOCD: CPT | Performed by: INTERNAL MEDICINE

## 2020-06-30 PROCEDURE — G8399 PT W/DXA RESULTS DOCUMENT: HCPCS | Performed by: INTERNAL MEDICINE

## 2020-06-30 PROCEDURE — 1090F PRES/ABSN URINE INCON ASSESS: CPT | Performed by: INTERNAL MEDICINE

## 2020-06-30 PROCEDURE — G8427 DOCREV CUR MEDS BY ELIG CLIN: HCPCS | Performed by: INTERNAL MEDICINE

## 2020-06-30 PROCEDURE — 99213 OFFICE O/P EST LOW 20 MIN: CPT | Performed by: INTERNAL MEDICINE

## 2020-06-30 PROCEDURE — 3017F COLORECTAL CA SCREEN DOC REV: CPT | Performed by: INTERNAL MEDICINE

## 2020-06-30 PROCEDURE — 4040F PNEUMOC VAC/ADMIN/RCVD: CPT | Performed by: INTERNAL MEDICINE

## 2020-06-30 RX ORDER — TRAZODONE HYDROCHLORIDE 50 MG/1
50-100 TABLET ORAL NIGHTLY
Qty: 60 TABLET | Refills: 0 | Status: SHIPPED | OUTPATIENT
Start: 2020-06-30 | End: 2020-07-28 | Stop reason: SDUPTHER

## 2020-06-30 RX ORDER — OXYCODONE AND ACETAMINOPHEN 7.5; 325 MG/1; MG/1
1 TABLET ORAL EVERY 6 HOURS PRN
Qty: 112 TABLET | Refills: 0 | Status: SHIPPED | OUTPATIENT
Start: 2020-06-30 | End: 2020-07-28 | Stop reason: SDUPTHER

## 2020-06-30 NOTE — PROGRESS NOTES
oriented to person, place, and time. Coordination is  normal.  Her mood isAppropriate and affect is Neutral/Euthymic(normal). Musculoskeletal / Extremities: Range of motion is normal. Gait is normal, assistive devices use: none. IMPRESSION:   1. Chronic pain syndrome    2. Fibromyalgia    3. DDD (degenerative disc disease), lumbar    4. Osteoarthritis of spine with radiculopathy, lumbar region    5. Lumbar spondylosis    6. Degeneration of lumbar or lumbosacral intervertebral disc    7. Trochanteric bursitis of right hip        PLAN:  Informed verbal consent regarding treatment was obtained  -continue with current opioid regimen. Continue with Percocet 4 per day  -She was advised to increase fluids ( 5-7  glasses of fluid daily), limit caffeine, avoid cheese products, increase dietary fiber, increase activity and exercise as tolerated and relax regularly and enjoy meals   -back stretching exercises as advised   -advised to walk 20 minutes daily as tolerated   -if symptoms continues for back will consider TPI    Current Outpatient Medications   Medication Sig Dispense Refill    tiZANidine (ZANAFLEX) 4 MG tablet Take one tablet po BID (Patient taking differently: Take 4 mg by mouth 2 times daily Take one tablet po BID) 60 tablet 1    oxyCODONE-acetaminophen (PERCOCET) 7.5-325 MG per tablet Take 1 tablet by mouth every 6 hours as needed for Pain for up to 28 days. Max 4 a day 112 tablet 0    gabapentin (NEURONTIN) 600 MG tablet Take 1 tablet by mouth 2 times daily for 30 days.  60 tablet 1    traZODone (DESYREL) 50 MG tablet Take 1-2 tablets by mouth nightly 60 tablet 0    Fluticasone furoate-vilanterol (BREO ELLIPTA) 200-25 MCG/INH AEPB inhaler INHALE 1 PUFF INTO THE LUNGS DAILY 60 each 0    DULoxetine (CYMBALTA) 60 MG extended release capsule Take one tablet at night 30 capsule 1    pantoprazole (PROTONIX) 40 MG tablet Take 1 tablet by mouth daily 30 tablet 1    meloxicam (MOBIC) 15 MG tablet TAKE 1 restoration of functioning- with focus on improvement in physical performance, general activity, work or disability,emotional distress, health care utilization and  decreased medication consumption. Will continue to monitor progress towards achieving/maintaining therapeutic goals with special emphasis on  1. Improvement in perceived interfernce  of pain with ADL's. Ability to do home exercises independently. Ability to do household chores indoor and/or outdoor work and social and leisure activities. Improve psychosocial and physical functioning. .rssmp     She was advised against drinking alcohol with the narcotic pain medicines, advised against driving or handling machinery while adjusting the dose of medicines or if having cognitive  issues related to the current medications. Risk of overdose and death, if medicines not taken as prescribed, were also discussed. If the patient develops new symptoms or if the symptoms worsen, the patient should call the office. While transcribing every attempt was made to maintain the accuracy of the note in terms of it's contents,there may have been some errors made inadvertently. Thank you for allowing me to participate in the care of this patient.     Sunny Alan MD.    Cc: HECTOR Perry - CNP

## 2020-07-06 NOTE — PROGRESS NOTES
C-Difficile admission screening and protocol:     * Admitted with diarrhea? no     *Prior history of C-Diff. In last 3 months? no     *Antibiotic use in the past 6-8 weeks? Yes eye drops     *Prior hospitalization or nursing home in the last month?  no

## 2020-07-06 NOTE — PROGRESS NOTES
[address]  time__0710__________        Surgery time_____0810_______    Take the following medications with a sip of water:  Albuterol inhaler use DOS and bring, Breo inhaler,  Pantoprazole     Do not eat or drink anything after 12:00 midnight prior to your surgery. This includes water chewing gum, mints and ice chips. You may brush your teeth and gargle the morning of your surgery, but do not swallow the water     Please see your family doctor/pediatrician for a history and physical and/or concerning medications. H&P 6/16/20 JOANNE Anne CNP   Bring any test results/reports from your physicians office. If you are under the care of a heart doctor or specialist doctor, please be aware that you may be asked to them for clearance    You may be asked to stop blood thinners such as Coumadin, Plavix, Fragmin, Lovenox, etc., or any anti-inflammatories such as:  Aspirin, Ibuprofen, Advil, Naproxen prior to your surgery. We also ask that you stop any OTC medications such as fish oil, vitamin E, glucosamine, garlic, Multivitamins, COQ 10, etc.    We ask that you do not smoke 24 hours prior to surgery  We ask that you do not  drink any alcoholic beverages 24 hours prior to surgery     You must make arrangements for a responsible adult to take you home after your surgery. For your safety you will not be allowed to leave alone or drive yourself home. Your surgery will be cancelled if you do not have a ride home. Also for your safety, it is strongly suggested that someone stay with you the first 24 hours after your surgery. A parent or legal guardian must accompany a child scheduled for surgery and plan to stay at the hospital until the child is discharged. Please do not bring other children with you. For your comfort, please wear simple loose fitting clothing to the hospital.  Please do not bring valuables.  Wear short sleeve button down shirt or loose fitting shirt. Bring eye drops or ointment day of surgery. Do not wear any make-up or nail polish on your fingers or toes      For your safety, please do not wear any jewelry or body piercing's on the day of surgery. All jewelry must be removed. If you have dentures, they will be removed before going to operating room. For your convenience, we will provide you with a container. If you wear contact lenses or glasses, they will be removed, please bring a case for them. If you have a living will and a durable power of  for healthcare, please bring in a copy. As part of our patient safety program to minimize surgical site infections, we ask you to do the following:    · Please notify your surgeon if you develop any illness between         now and the  day of your surgery. · This includes a cough, cold, fever, sore throat, nausea,         or vomiting, and diarrhea, etc.  ·  Please notify your surgeon if you experience dizziness, shortness         of breath or blurred vision between now and the time of your surgery. Do not shave your operative site 96 hours prior to surgery. For face and neck surgery, men may use an electric razor 48 hours   prior to surgery. You may shower the night before surgery or the morning of   your surgery with an antibacterial soap. You will need to bring a photo ID and insurance card    [facility] has an onsite pharmacy, would you like to utilize our pharmacy     If you will be staying overnight and use a C-pap machine, please bring   your C-pap to hospital     Our goal is to provide you with excellent care, therefore, visitors will be limited to two(2) in the room at a time so that we may focus on providing this care for you. Please contact pre-admission testing if you have any further questions.                  [facility] phone number:  [phone]  Please note these are generalized instructions for all surgical cases, you may be provided with more specific instructions according to your surgery.

## 2020-07-07 RX ORDER — MELOXICAM 15 MG/1
TABLET ORAL
Qty: 30 TABLET | Refills: 1 | Status: SHIPPED | OUTPATIENT
Start: 2020-07-07 | End: 2020-07-28 | Stop reason: SDUPTHER

## 2020-07-12 RX ORDER — SODIUM CHLORIDE 0.9 % (FLUSH) 0.9 %
10 SYRINGE (ML) INJECTION PRN
Status: CANCELLED | OUTPATIENT
Start: 2020-07-12

## 2020-07-12 RX ORDER — SODIUM CHLORIDE 0.9 % (FLUSH) 0.9 %
10 SYRINGE (ML) INJECTION EVERY 12 HOURS SCHEDULED
Status: CANCELLED | OUTPATIENT
Start: 2020-07-12

## 2020-07-12 RX ORDER — PHENYLEPHRINE HYDROCHLORIDE 25 MG/ML
1 SOLUTION/ DROPS OPHTHALMIC SEE ADMIN INSTRUCTIONS
Status: CANCELLED | OUTPATIENT
Start: 2020-07-12

## 2020-07-12 RX ORDER — CYCLOPENTOLATE HYDROCHLORIDE 10 MG/ML
1 SOLUTION/ DROPS OPHTHALMIC SEE ADMIN INSTRUCTIONS
Status: CANCELLED | OUTPATIENT
Start: 2020-07-12

## 2020-07-12 RX ORDER — CIPROFLOXACIN HYDROCHLORIDE 3.5 MG/ML
1 SOLUTION/ DROPS TOPICAL SEE ADMIN INSTRUCTIONS
Status: CANCELLED | OUTPATIENT
Start: 2020-07-12

## 2020-07-12 RX ORDER — TETRACAINE HYDROCHLORIDE 5 MG/ML
1 SOLUTION OPHTHALMIC SEE ADMIN INSTRUCTIONS
Status: CANCELLED | OUTPATIENT
Start: 2020-07-12

## 2020-07-13 ENCOUNTER — ANESTHESIA EVENT (OUTPATIENT)
Dept: SURGERY | Age: 75
End: 2020-07-13
Payer: MEDICARE

## 2020-07-13 NOTE — PROGRESS NOTES
[address]        Pre-Op Phone Call:     Patient Name: Matias Boston     Telephone Information:   Mobile [phone]     Home phone:  [phone]    Surgery Time:    8:10 AM     Arrival Time:  3679     Left extended Message:  Yes     Message left with:     Recent change in health status:  No     Advised of transportation/ policy:  Yes     NPO policy reviewed:  Yes pt     Advised to take morning heart/blood pressure medications with sips of water morning of surgery? Yes     Instructed to bring eye drops, photo identification, and insurance card day of surgery? Yes     Advised to wear short sleeved button down shirt (no T-shirt underneath):  Yes     Advised not to wear jewelry, hairpins, or pantyhose day of surgery? Yes     Advised not to wear make-up and to wash face day of surgery?   Yes    Remarks:        Electronically signed by:  Benny Allred RN at 7/13/2020 1:42 PM

## 2020-07-14 ENCOUNTER — HOSPITAL ENCOUNTER (OUTPATIENT)
Age: 75
Setting detail: OUTPATIENT SURGERY
Discharge: HOME | End: 2020-07-14
Attending: OPHTHALMOLOGY | Admitting: OPHTHALMOLOGY
Payer: MEDICARE

## 2020-07-14 ENCOUNTER — ANESTHESIA (OUTPATIENT)
Dept: SURGERY | Age: 75
End: 2020-07-14
Payer: MEDICARE

## 2020-07-14 VITALS
DIASTOLIC BLOOD PRESSURE: 78 MMHG | RESPIRATION RATE: 14 BRPM | TEMPERATURE: 96.7 F | BODY MASS INDEX: 24.07 KG/M2 | HEIGHT: 64 IN | OXYGEN SATURATION: 94 % | HEART RATE: 68 BPM | SYSTOLIC BLOOD PRESSURE: 172 MMHG | WEIGHT: 141 LBS

## 2020-07-14 VITALS — OXYGEN SATURATION: 100 % | DIASTOLIC BLOOD PRESSURE: 68 MMHG | SYSTOLIC BLOOD PRESSURE: 165 MMHG

## 2020-07-14 LAB
GLUCOSE BLD-MCNC: 104 MG/DL (ref 70–99)
GLUCOSE BLD-MCNC: 106 MG/DL (ref 70–99)
PERFORMED ON: ABNORMAL
PERFORMED ON: ABNORMAL

## 2020-07-14 PROCEDURE — 6370000000 HC RX 637 (ALT 250 FOR IP): Performed by: OPHTHALMOLOGY

## 2020-07-14 PROCEDURE — 2580000003 HC RX 258: Performed by: ANESTHESIOLOGY

## 2020-07-14 PROCEDURE — 7100000010 HC PHASE II RECOVERY - FIRST 15 MIN: Performed by: OPHTHALMOLOGY

## 2020-07-14 PROCEDURE — 6360000002 HC RX W HCPCS: Performed by: NURSE ANESTHETIST, CERTIFIED REGISTERED

## 2020-07-14 PROCEDURE — 6360000002 HC RX W HCPCS: Performed by: OPHTHALMOLOGY

## 2020-07-14 PROCEDURE — 2720000010 HC SURG SUPPLY STERILE: Performed by: OPHTHALMOLOGY

## 2020-07-14 PROCEDURE — 3600000004 HC SURGERY LEVEL 4 BASE: Performed by: OPHTHALMOLOGY

## 2020-07-14 PROCEDURE — 2500000003 HC RX 250 WO HCPCS: Performed by: OPHTHALMOLOGY

## 2020-07-14 PROCEDURE — 3700000000 HC ANESTHESIA ATTENDED CARE: Performed by: OPHTHALMOLOGY

## 2020-07-14 PROCEDURE — 2580000003 HC RX 258: Performed by: NURSE ANESTHETIST, CERTIFIED REGISTERED

## 2020-07-14 PROCEDURE — 2709999900 HC NON-CHARGEABLE SUPPLY: Performed by: OPHTHALMOLOGY

## 2020-07-14 PROCEDURE — 3700000001 HC ADD 15 MINUTES (ANESTHESIA): Performed by: OPHTHALMOLOGY

## 2020-07-14 PROCEDURE — V2632 POST CHMBR INTRAOCULAR LENS: HCPCS | Performed by: OPHTHALMOLOGY

## 2020-07-14 PROCEDURE — 3600000014 HC SURGERY LEVEL 4 ADDTL 15MIN: Performed by: OPHTHALMOLOGY

## 2020-07-14 DEVICE — IMPLANTABLE DEVICE: Type: IMPLANTABLE DEVICE | Site: EYE | Status: FUNCTIONAL

## 2020-07-14 RX ORDER — SODIUM CHLORIDE 0.9 % (FLUSH) 0.9 %
10 SYRINGE (ML) INJECTION EVERY 12 HOURS SCHEDULED
Status: DISCONTINUED | OUTPATIENT
Start: 2020-07-14 | End: 2020-07-14 | Stop reason: HOSPADM

## 2020-07-14 RX ORDER — BRIMONIDINE TARTRATE 2 MG/ML
SOLUTION/ DROPS OPHTHALMIC
Status: COMPLETED | OUTPATIENT
Start: 2020-07-14 | End: 2020-07-14

## 2020-07-14 RX ORDER — SODIUM CHLORIDE 9 MG/ML
INJECTION, SOLUTION INTRAVENOUS CONTINUOUS PRN
Status: DISCONTINUED | OUTPATIENT
Start: 2020-07-14 | End: 2020-07-14 | Stop reason: SDUPTHER

## 2020-07-14 RX ORDER — LABETALOL HYDROCHLORIDE 5 MG/ML
5 INJECTION, SOLUTION INTRAVENOUS EVERY 10 MIN PRN
Status: DISCONTINUED | OUTPATIENT
Start: 2020-07-14 | End: 2020-07-14 | Stop reason: HOSPADM

## 2020-07-14 RX ORDER — PROMETHAZINE HYDROCHLORIDE 25 MG/ML
6.25 INJECTION, SOLUTION INTRAMUSCULAR; INTRAVENOUS
Status: DISCONTINUED | OUTPATIENT
Start: 2020-07-14 | End: 2020-07-14 | Stop reason: HOSPADM

## 2020-07-14 RX ORDER — SODIUM CHLORIDE 9 MG/ML
INJECTION, SOLUTION INTRAVENOUS CONTINUOUS
Status: DISCONTINUED | OUTPATIENT
Start: 2020-07-14 | End: 2020-07-14 | Stop reason: HOSPADM

## 2020-07-14 RX ORDER — PHENYLEPHRINE HCL 2.5 %
1 DROPS OPHTHALMIC (EYE) SEE ADMIN INSTRUCTIONS
Status: DISCONTINUED | OUTPATIENT
Start: 2020-07-14 | End: 2020-07-14 | Stop reason: HOSPADM

## 2020-07-14 RX ORDER — CIPROFLOXACIN HYDROCHLORIDE 3.5 MG/ML
1 SOLUTION/ DROPS TOPICAL
Status: COMPLETED | OUTPATIENT
Start: 2020-07-14 | End: 2020-07-14

## 2020-07-14 RX ORDER — SODIUM CHLORIDE 0.9 % (FLUSH) 0.9 %
10 SYRINGE (ML) INJECTION PRN
Status: DISCONTINUED | OUTPATIENT
Start: 2020-07-14 | End: 2020-07-14 | Stop reason: HOSPADM

## 2020-07-14 RX ORDER — CIPROFLOXACIN HYDROCHLORIDE 3.5 MG/ML
SOLUTION/ DROPS TOPICAL
Status: COMPLETED | OUTPATIENT
Start: 2020-07-14 | End: 2020-07-14

## 2020-07-14 RX ORDER — ONDANSETRON 2 MG/ML
4 INJECTION INTRAMUSCULAR; INTRAVENOUS
Status: DISCONTINUED | OUTPATIENT
Start: 2020-07-14 | End: 2020-07-14 | Stop reason: HOSPADM

## 2020-07-14 RX ORDER — CYCLOPENTOLATE HYDROCHLORIDE 10 MG/ML
1 SOLUTION/ DROPS OPHTHALMIC SEE ADMIN INSTRUCTIONS
Status: DISCONTINUED | OUTPATIENT
Start: 2020-07-14 | End: 2020-07-14 | Stop reason: HOSPADM

## 2020-07-14 RX ORDER — TETRACAINE HYDROCHLORIDE 5 MG/ML
1 SOLUTION OPHTHALMIC SEE ADMIN INSTRUCTIONS
Status: DISCONTINUED | OUTPATIENT
Start: 2020-07-14 | End: 2020-07-14 | Stop reason: HOSPADM

## 2020-07-14 RX ORDER — SODIUM CHLORIDE 0.9 % (FLUSH) 0.9 %
10 SYRINGE (ML) INJECTION PRN
Status: DISCONTINUED | OUTPATIENT
Start: 2020-07-14 | End: 2020-07-14 | Stop reason: SDUPTHER

## 2020-07-14 RX ORDER — MIDAZOLAM HYDROCHLORIDE 1 MG/ML
INJECTION INTRAMUSCULAR; INTRAVENOUS PRN
Status: DISCONTINUED | OUTPATIENT
Start: 2020-07-14 | End: 2020-07-14 | Stop reason: SDUPTHER

## 2020-07-14 RX ORDER — FENTANYL CITRATE 50 UG/ML
INJECTION, SOLUTION INTRAMUSCULAR; INTRAVENOUS PRN
Status: DISCONTINUED | OUTPATIENT
Start: 2020-07-14 | End: 2020-07-14 | Stop reason: SDUPTHER

## 2020-07-14 RX ORDER — TETRACAINE HYDROCHLORIDE 5 MG/ML
SOLUTION OPHTHALMIC
Status: COMPLETED | OUTPATIENT
Start: 2020-07-14 | End: 2020-07-14

## 2020-07-14 RX ORDER — SODIUM CHLORIDE 0.9 % (FLUSH) 0.9 %
10 SYRINGE (ML) INJECTION EVERY 12 HOURS SCHEDULED
Status: DISCONTINUED | OUTPATIENT
Start: 2020-07-14 | End: 2020-07-14 | Stop reason: SDUPTHER

## 2020-07-14 RX ADMIN — FENTANYL CITRATE 50 MCG: 50 INJECTION INTRAMUSCULAR; INTRAVENOUS at 08:12

## 2020-07-14 RX ADMIN — CYCLOPENTOLATE HYDROCHLORIDE 1 DROP: 10 SOLUTION OPHTHALMIC at 07:08

## 2020-07-14 RX ADMIN — PHENYLEPHRINE HYDROCHLORIDE 1 DROP: 25 SOLUTION/ DROPS OPHTHALMIC at 07:11

## 2020-07-14 RX ADMIN — TETRACAINE HYDROCHLORIDE 1 DROP: 5 SOLUTION OPHTHALMIC at 06:58

## 2020-07-14 RX ADMIN — MIDAZOLAM 1 MG: 1 INJECTION INTRAMUSCULAR; INTRAVENOUS at 08:12

## 2020-07-14 RX ADMIN — SODIUM CHLORIDE: 9 INJECTION, SOLUTION INTRAVENOUS at 08:12

## 2020-07-14 RX ADMIN — SODIUM CHLORIDE: 9 INJECTION, SOLUTION INTRAVENOUS at 07:07

## 2020-07-14 RX ADMIN — CYCLOPENTOLATE HYDROCHLORIDE 1 DROP: 10 SOLUTION OPHTHALMIC at 07:02

## 2020-07-14 RX ADMIN — CIPROFLOXACIN 1 DROP: 3 SOLUTION OPHTHALMIC at 07:00

## 2020-07-14 RX ADMIN — CIPROFLOXACIN 1 DROP: 3 SOLUTION OPHTHALMIC at 07:05

## 2020-07-14 RX ADMIN — PHENYLEPHRINE HYDROCHLORIDE 1 DROP: 25 SOLUTION/ DROPS OPHTHALMIC at 07:06

## 2020-07-14 ASSESSMENT — PULMONARY FUNCTION TESTS
PIF_VALUE: 0

## 2020-07-14 ASSESSMENT — PAIN SCALES - GENERAL
PAINLEVEL_OUTOF10: 5
PAINLEVEL_OUTOF10: 5

## 2020-07-14 ASSESSMENT — PAIN DESCRIPTION - PAIN TYPE: TYPE: CHRONIC PAIN

## 2020-07-14 ASSESSMENT — PAIN - FUNCTIONAL ASSESSMENT
PAIN_FUNCTIONAL_ASSESSMENT: ACTIVITIES ARE NOT PREVENTED
PAIN_FUNCTIONAL_ASSESSMENT: 0-10

## 2020-07-14 ASSESSMENT — PAIN DESCRIPTION - DESCRIPTORS
DESCRIPTORS: ACHING;CONSTANT
DESCRIPTORS: ACHING;CONSTANT

## 2020-07-14 ASSESSMENT — PAIN DESCRIPTION - LOCATION: LOCATION: BACK

## 2020-07-14 ASSESSMENT — PAIN DESCRIPTION - ONSET: ONSET: ON-GOING

## 2020-07-14 ASSESSMENT — PAIN DESCRIPTION - ORIENTATION: ORIENTATION: LOWER

## 2020-07-14 ASSESSMENT — COPD QUESTIONNAIRES: CAT_SEVERITY: MILD

## 2020-07-14 NOTE — H&P
Date of Surgery Update:  Leticia Fam was seen, history and physical examination reviewed, and patient examined by me today. There have been no significant clinical changes since the completion of the previous history and physical.    The risk, benefits, and alternatives of the proposed procedure have been explained to the patient (or appropriate guardian) and understanding verbalized. All questions answered. Patient wishes to proceed.     Electronically signed by: Santiago Valle MD,7/14/2020,7:35 AM

## 2020-07-14 NOTE — ANESTHESIA PRE PROCEDURE
[facility] Department of Anesthesiology  Pre-Anesthesia Evaluation/Consultation       Name:  Bri Garrett  : 1945  Age:  69 y.o.                                            MRN:  [id number]  Date: 2020           Surgeon: Surgeon(s):  Kristel Cota MD    Procedure: Procedure(s):  Phacoemulsification with intraocular lens implant right     No Known Allergies  Patient Active Problem List   Diagnosis    Chronic obstructive pulmonary disease ([address]    Osteopenia-last dexa  repeat  (-2.4 hip)--advised tx    Colon polyp, hyperplastic,-(done 3/11-repeat 3-5 yrs--dr Kaitlyn Pereira)    Family history of colon cancer    CTS (carpal tunnel syndrome)BILATERAL-s/p surgical repair--resolved     Postmenopausal status-last mammogram 2/15 wnl last pap --wnl    Nondependent alcohol abuse, in remission    Urticaria, chronic    Tobacco abuse-advised to quit--lung cancer screening neg --repeat low dose ct 1 yr    Chronic back pain-(djd) saw dr Wolf Chairez afford surgery--seeing dr Jarvis Jaime for pain meds    Fibromyalgia    Hypercholesteremia    Lumbar spondylosis    Vitamin D deficiency--severe--started 50,000 iu 2x/ wk     Insomnia--on elevil w help    Constipation--on daily miralax    Prediabetes--a1c was 6.3 --diet advised    Depression    Chronic pain syndrome    Muscle cramping    Acute and chronic respiratory failure with hypoxia ([address]    ROLY (acute kidney injury) ([address]    Gastroesophageal reflux disease    COPD, severe ([address]    Chronic hypoxemic respiratory failure (HCC)    Degeneration of lumbar or lumbosacral intervertebral disc    Trochanteric bursitis of right hip    COPD exacerbation ([address]    New onset type 2 diabetes mellitus ([address]     Past Medical History:   Diagnosis Date    Chronic pain syndrome     Colorectal polyps     COPD (chronic obstructive pulmonary disease) (HCC)     CTS (carpal tunnel syndrome)     BILATERAL    DDD (degenerative disc cetirizine (ZYRTEC) 10 MG tablet Take 10 mg by mouth daily as needed for Allergies      Nebulizers (COMPRESSOR/NEBULIZER) MISC 1 Units by Does not apply route 4 times daily 1 each 3    ipratropium-albuterol (DUONEB) 0.5-2.5 (3) MG/3ML SOLN nebulizer solution Take 1 aerosol every 4 hours for 2 days and then as needed for shortness of breath coughing and wheezing 360 mL 3     Current Facility-Administered Medications   Medication Dose Route Frequency Provider Last Rate Last Dose    0.9 % sodium chloride infusion   Intravenous Continuous Kailyn Mahoney  mL/hr at 20 0707      sodium chloride flush 0.9 % injection 10 mL  10 mL Intravenous 2 times per day Kailyn Mahoney MD        sodium chloride flush 0.9 % injection 10 mL  10 mL Intravenous PRN Kailyn Mahoney MD        cyclopentolate (CYCLOGYL) 1 % ophthalmic solution 1 drop  1 drop Right Eye See Admin Instructions Omkar Blackburn MD   1 drop at 20 0708    phenylephrine (MYDFRIN) 2.5 % ophthalmic solution 1 drop  1 drop Right Eye See Admin Instructions Omkar Blackburn MD   1 drop at 20 0711    tetracaine (TETRAVISC) 0.5 % ophthalmic solution 1 drop  1 drop Right Eye See Admin Instructions Omkar Blackburn MD   1 drop at 20     Vital Signs (Current)   Vitals:    20   BP: (!) 168/66   Pulse: 64   Resp: 19   Temp: 97.7 °F (36.5 °C)   SpO2: 91%     Vital Signs Statistics (for past 48 hrs)     Temp  Av.7 °F (36.5 °C)  Min: 97.7 °F (36.5 °C)   Min taken time: 20  Max: 97.7 °F (36.5 °C)   Max taken time: 20  Pulse  Av  Min: 59   Min taken time: 20  Max: 59   Max taken time: 20  Resp  Av  Min: 23   Min taken time: 20  Max: 23   Max taken time: 20  BP  Min: 168/66   Min taken time: 20  Max: 168/66   Max taken time: 20  SpO2  Av %  Min: 91 %   Min taken time: 20  Max: 91 %   Max taken time: 20    BP consumption: 0430(sip of water with meds)   Date of last solid food consumption: 07/13/20      Time of last solid consumption: 2200       Anesthesia Evaluation  Patient summary reviewed no history of anesthetic complications:   Airway: Mallampati: III  TM distance: >3 FB   Neck ROM: full   Dental:    (+) partials      Pulmonary:normal exam    (+) COPD: mild,                             Cardiovascular:  Exercise tolerance: good (>4 METS),         ECG reviewed  Rhythm: regular  Rate: normal           Beta Blocker:  Not on Beta Blocker         Neuro/Psych:   (+) neuromuscular disease:, psychiatric history:depression/anxiety             GI/Hepatic/Renal:   (+) GERD:,           Endo/Other:    (+) DiabetesType II DM, , .                 Abdominal:           Vascular: negative vascular ROS. Anesthesia Plan      MAC     ASA 3       Induction: intravenous. Anesthetic plan and risks discussed with patient. Plan discussed with CRNA. This pre-anesthesia assessment may be used as a history and physical.    DOS STAFF ADDENDUM:    Pt seen and examined, chart reviewed (including anesthesia, drug and allergy history). No interval changes to history and physical examination. Anesthetic plan, risks, benefits, alternatives, and personnel involved discussed with patient. Questions and concerns addressed. Patient(family) verbalized an understanding and agrees to proceed.       Sujey Hobson MD  July 14, 2020  7:24 AM

## 2020-07-14 NOTE — ANESTHESIA POSTPROCEDURE EVALUATION
[facility] Department of Anesthesiology  Post-Anesthesia Note       Name:  María Elena Alejandre                                  Age:  69 y.o. MRN:  [id number]     Last Vitals & Oxygen Saturation: BP (!) 177/81   Pulse 65   Temp 96.7 °F (35.9 °C) (Temporal)   Resp 21   Ht 5' 4\" (1.626 m)   Wt 141 lb (64 kg)   SpO2 95%   BMI 24.20 kg/m²   Patient Vitals for the past 4 hrs:   BP Temp Temp src Pulse Resp SpO2 Height Weight   07/14/20 0844 -- -- -- 65 -- -- -- --   07/14/20 0842 (!) 177/81 96.7 °F (35.9 °C) Temporal 64 21 95 % -- --   07/14/20 0659 -- -- -- -- -- -- 5' 4\" (1.626 m) 141 lb (64 kg)   07/14/20 0658 (!) 168/66 97.7 °F (36.5 °C) Temporal 64 19 91 % -- --       Level of consciousness:  Awake, alert    Respiratory: Respirations easy, no distress. Stable. Cardiovascular: Hemodynamically stable. Hydration: Adequate. PONV: Adequately managed. Post-op pain: Adequately controlled. Post-op assessment: Tolerated anesthetic well without complication. Complications:  None.     Rosa Gonzalez MD  July 14, 2020   8:49 AM

## 2020-07-28 ENCOUNTER — OFFICE VISIT (OUTPATIENT)
Dept: PAIN MANAGEMENT | Age: 75
End: 2020-07-28
Payer: MEDICARE

## 2020-07-28 VITALS
HEART RATE: 67 BPM | BODY MASS INDEX: 24.03 KG/M2 | SYSTOLIC BLOOD PRESSURE: 122 MMHG | WEIGHT: 140 LBS | TEMPERATURE: 96.8 F | DIASTOLIC BLOOD PRESSURE: 61 MMHG

## 2020-07-28 PROCEDURE — 4040F PNEUMOC VAC/ADMIN/RCVD: CPT | Performed by: INTERNAL MEDICINE

## 2020-07-28 PROCEDURE — G8427 DOCREV CUR MEDS BY ELIG CLIN: HCPCS | Performed by: INTERNAL MEDICINE

## 2020-07-28 PROCEDURE — 99213 OFFICE O/P EST LOW 20 MIN: CPT | Performed by: INTERNAL MEDICINE

## 2020-07-28 PROCEDURE — 20610 DRAIN/INJ JOINT/BURSA W/O US: CPT | Performed by: INTERNAL MEDICINE

## 2020-07-28 PROCEDURE — 1123F ACP DISCUSS/DSCN MKR DOCD: CPT | Performed by: INTERNAL MEDICINE

## 2020-07-28 PROCEDURE — 1090F PRES/ABSN URINE INCON ASSESS: CPT | Performed by: INTERNAL MEDICINE

## 2020-07-28 PROCEDURE — G8399 PT W/DXA RESULTS DOCUMENT: HCPCS | Performed by: INTERNAL MEDICINE

## 2020-07-28 PROCEDURE — 3017F COLORECTAL CA SCREEN DOC REV: CPT | Performed by: INTERNAL MEDICINE

## 2020-07-28 RX ORDER — DULOXETIN HYDROCHLORIDE 60 MG/1
CAPSULE, DELAYED RELEASE ORAL
Qty: 30 CAPSULE | Refills: 1 | Status: SHIPPED | OUTPATIENT
Start: 2020-07-28 | End: 2020-08-25 | Stop reason: SDUPTHER

## 2020-07-28 RX ORDER — OXYCODONE AND ACETAMINOPHEN 7.5; 325 MG/1; MG/1
1 TABLET ORAL EVERY 6 HOURS PRN
Qty: 112 TABLET | Refills: 0 | Status: SHIPPED | OUTPATIENT
Start: 2020-07-28 | End: 2020-08-25 | Stop reason: SDUPTHER

## 2020-07-28 RX ORDER — MELOXICAM 15 MG/1
TABLET ORAL
Qty: 30 TABLET | Refills: 1 | Status: SHIPPED | OUTPATIENT
Start: 2020-07-28 | End: 2020-09-22 | Stop reason: SDUPTHER

## 2020-07-28 RX ORDER — GABAPENTIN 600 MG/1
600 TABLET ORAL 2 TIMES DAILY
Qty: 60 TABLET | Refills: 1 | Status: SHIPPED | OUTPATIENT
Start: 2020-07-28 | End: 2020-08-25 | Stop reason: SDUPTHER

## 2020-07-28 RX ORDER — TIZANIDINE 4 MG/1
TABLET ORAL
Qty: 60 TABLET | Refills: 1 | Status: SHIPPED | OUTPATIENT
Start: 2020-07-28 | End: 2020-08-25 | Stop reason: SDUPTHER

## 2020-07-28 RX ORDER — TRAZODONE HYDROCHLORIDE 50 MG/1
50-100 TABLET ORAL NIGHTLY
Qty: 60 TABLET | Refills: 0 | Status: SHIPPED | OUTPATIENT
Start: 2020-07-28 | End: 2020-08-25 | Stop reason: SDUPTHER

## 2020-07-28 RX ORDER — PANTOPRAZOLE SODIUM 40 MG/1
40 TABLET, DELAYED RELEASE ORAL DAILY
Qty: 30 TABLET | Refills: 1 | Status: SHIPPED | OUTPATIENT
Start: 2020-07-28 | End: 2020-08-25 | Stop reason: SDUPTHER

## 2020-07-28 RX ADMIN — TRIAMCINOLONE ACETONIDE 40 MG: 40 INJECTION, SUSPENSION INTRA-ARTICULAR; INTRAMUSCULAR at 18:36

## 2020-07-28 NOTE — PROGRESS NOTES
hours as needed for Pain for up to 28 days. Max 4 a day 112 tablet 0    traZODone (DESYREL) 50 MG tablet Take 1-2 tablets by mouth nightly 60 tablet 0    tiZANidine (ZANAFLEX) 4 MG tablet Take one tablet po BID (Patient taking differently: Take 4 mg by mouth 2 times daily Take one tablet po BID) 60 tablet 1    Fluticasone furoate-vilanterol (BREO ELLIPTA) 200-25 MCG/INH AEPB inhaler INHALE 1 PUFF INTO THE LUNGS DAILY 60 each 0    DULoxetine (CYMBALTA) 60 MG extended release capsule Take one tablet at night 30 capsule 1    pantoprazole (PROTONIX) 40 MG tablet Take 1 tablet by mouth daily 30 tablet 1    atorvastatin (LIPITOR) 80 MG tablet TAKE 1 TABLET BY MOUTH EVERY DAY FOR CHOLESTEROL 90 tablet 1    Blood Glucose Monitoring Suppl (ONE TOUCH ULTRA MINI) w/Device KIT 1 kit by Does not apply route daily 1 kit 0    ONE TOUCH LANCETS MISC 1 each by Does not apply route daily 100 each 3    blood glucose test strips (ASCENSIA AUTODISC VI;ONE TOUCH ULTRA TEST VI) strip 1 each by In Vitro route daily 100 each 3    albuterol sulfate  (90 Base) MCG/ACT inhaler Inhale 2 puffs into the lungs every 6 hours as needed for Shortness of Breath 1 Inhaler 2    ipratropium-albuterol (DUONEB) 0.5-2.5 (3) MG/3ML SOLN nebulizer solution Take 1 aerosol every 4 hours for 2 days and then as needed for shortness of breath coughing and wheezing 360 mL 3    Cholecalciferol (VITAMIN D) 2000 units TABS tablet Take 1 tablet by mouth daily 30 tablet     cetirizine (ZYRTEC) 10 MG tablet Take 10 mg by mouth daily as needed for Allergies      Nebulizers (COMPRESSOR/NEBULIZER) MISC 1 Units by Does not apply route 4 times daily 1 each 3    gabapentin (NEURONTIN) 600 MG tablet Take 1 tablet by mouth 2 times daily for 30 days. 60 tablet 1     No facility-administered medications prior to visit. SOCIAL/FAMILY/PAST MEDICAL HISTORY: Ms. Balbina Green, family and past medical history was reviewed.      REVIEW OF SYSTEMS: Respiratory: Negative for apnea, chest tightness and shortness of breath or change in baseline breathing. Gastrointestinal: Negative for nausea, vomiting, abdominal pain, diarrhea, constipation, blood in stool and abdominal distention. PHYSICAL EXAM:   Nursing note and vitals reviewed. /61   Pulse 67   Temp 96.8 °F (36 °C) (Temporal)   Wt 140 lb (63.5 kg)   BMI 24.03 kg/m²   Constitutional: She appears well-developed and well-nourished. No acute distress. Skin: Skin is warm and dry, good turgor. No rash noted. She is not diaphoretic. Cardiovascular: Normal rate, regular rhythm, normal heart sounds, and does not have murmur. Pulmonary/Chest: Effort normal. No respiratory distress. She does not have wheezes in the lung fields. She has no rales. Neurological/Psychiatric:She is alert and oriented to person, place, and time. Coordination is  normal.  Her mood isAppropriate and affect is Flat/blunted and Anxious . Her  Other: tenderness right hip, greater trochanter area  IMPRESSION:   1. Trochanteric bursitis of right hip    2. Chronic pain syndrome    3. Fibromyalgia    4. DDD (degenerative disc disease), lumbar    5. Osteoarthritis of spine with radiculopathy, lumbar region    6. Lumbar spondylosis    7. Degeneration of lumbar or lumbosacral intervertebral disc        PLAN:  Informed verbal consent was obtained  -f/u with PCP regarding weight loss  -Adv Biofeedback, relaxation and meditation techniques. Referral to psychologist for CBT was also discussed with patient  -She was advised to increase fluids ( 5-7  glasses of fluid daily), limit caffeine, avoid cheese products, increase dietary fiber, increase activity and exercise as tolerated and relax regularly and enjoy meals   -continue with Percocet 4 per day  -walking/stretching exercises as advised    -Informed verbal consent was obtained from the patient. Risks and benefits of the procedure were explained.  Complications of the procedure and side effects of kenalog/ lidocaine were discussed with patient. Using 0.25% marcaine and 1cc of kenalog=40mg the right Hip, greater trochanter area was injected under aseptic conditions. Patient tolerated procedure well. Advised ice and rest.      Current Outpatient Medications   Medication Sig Dispense Refill    meloxicam (MOBIC) 15 MG tablet TAKE 1 TABLET BY MOUTH DAILY 30 tablet 1    oxyCODONE-acetaminophen (PERCOCET) 7.5-325 MG per tablet Take 1 tablet by mouth every 6 hours as needed for Pain for up to 28 days.  Max 4 a day 112 tablet 0    traZODone (DESYREL) 50 MG tablet Take 1-2 tablets by mouth nightly 60 tablet 0    tiZANidine (ZANAFLEX) 4 MG tablet Take one tablet po BID (Patient taking differently: Take 4 mg by mouth 2 times daily Take one tablet po BID) 60 tablet 1    Fluticasone furoate-vilanterol (BREO ELLIPTA) 200-25 MCG/INH AEPB inhaler INHALE 1 PUFF INTO THE LUNGS DAILY 60 each 0    DULoxetine (CYMBALTA) 60 MG extended release capsule Take one tablet at night 30 capsule 1    pantoprazole (PROTONIX) 40 MG tablet Take 1 tablet by mouth daily 30 tablet 1    atorvastatin (LIPITOR) 80 MG tablet TAKE 1 TABLET BY MOUTH EVERY DAY FOR CHOLESTEROL 90 tablet 1    Blood Glucose Monitoring Suppl (ONE TOUCH ULTRA MINI) w/Device KIT 1 kit by Does not apply route daily 1 kit 0    ONE TOUCH LANCETS MISC 1 each by Does not apply route daily 100 each 3    blood glucose test strips (ASCENSIA AUTODISC VI;ONE TOUCH ULTRA TEST VI) strip 1 each by In Vitro route daily 100 each 3    albuterol sulfate  (90 Base) MCG/ACT inhaler Inhale 2 puffs into the lungs every 6 hours as needed for Shortness of Breath 1 Inhaler 2    ipratropium-albuterol (DUONEB) 0.5-2.5 (3) MG/3ML SOLN nebulizer solution Take 1 aerosol every 4 hours for 2 days and then as needed for shortness of breath coughing and wheezing 360 mL 3    Cholecalciferol (VITAMIN D) 2000 units TABS tablet Take 1 tablet by mouth daily 30 tablet     cetirizine (ZYRTEC) 10 MG tablet Take 10 mg by mouth daily as needed for Allergies      Nebulizers (COMPRESSOR/NEBULIZER) MISC 1 Units by Does not apply route 4 times daily 1 each 3    gabapentin (NEURONTIN) 600 MG tablet Take 1 tablet by mouth 2 times daily for 30 days. 60 tablet 1     No current facility-administered medications for this visit. I will continue her current medication regimen  which is part of the above treatment schedule. It has been helping with Ms. Katrina Lawton chronic  medical problems which for this visit include: The primary encounter diagnosis was Chronic pain syndrome. Diagnoses of Fibromyalgia, DDD (degenerative disc disease), lumbar, Osteoarthritis of spine with radiculopathy, lumbar region, Lumbar spondylosis, Degeneration of lumbar or lumbosacral intervertebral disc, Trochanteric bursitis of right hip, Medication side effects present, initial encounter, Primary insomnia, Gastroesophageal reflux disease without esophagitis, and Drug-induced constipation were also pertinent to this visit. Risks and benefits of the medications and other alternative treatments  including no treatment were discussed with the patient. The common side effects of these medications were also explained to the patient. Informed verbal consent was obtained. Goals of current treatment regimen include improvement in pain, restoration of functioning- with focus on improvement in physical performance, general activity, work or disability,emotional distress, health care utilization and  decreased medication consumption. Will continue to monitor progress towards achieving/maintaining therapeutic goals with special emphasis on  1. Improvement in perceived interfernce  of pain with ADL's. Ability to do home exercises independently. Ability to do household chores indoor and/or outdoor work and social and leisure activities. Improve psychosocial and physical functioning. - she is showing progression towards this treatment goal with the current regimen. She was advised against drinking alcohol with the narcotic pain medicines, advised against driving or handling machinery while adjusting the dose of medicines or if having cognitive  issues related to the current medications. Risk of overdose and death, if medicines not taken as prescribed, were also discussed. If the patient develops new symptoms or if the symptoms worsen, the patient should call the office. While transcribing every attempt was made to maintain the accuracy of the note in terms of it's contents,there may have been some errors made inadvertently. Thank you for allowing me to participate in the care of this patient.     Ml Hernandez MD.    Cc: HECTOR Armijo - CNP

## 2020-07-29 RX ORDER — TRIAMCINOLONE ACETONIDE 40 MG/ML
40 INJECTION, SUSPENSION INTRA-ARTICULAR; INTRAMUSCULAR ONCE
Status: COMPLETED | OUTPATIENT
Start: 2020-07-28 | End: 2020-07-28

## 2020-08-20 ENCOUNTER — OFFICE VISIT (OUTPATIENT)
Dept: PULMONOLOGY | Age: 75
End: 2020-08-20
Payer: MEDICARE

## 2020-08-20 VITALS
OXYGEN SATURATION: 93 % | HEIGHT: 64 IN | HEART RATE: 64 BPM | WEIGHT: 136 LBS | DIASTOLIC BLOOD PRESSURE: 64 MMHG | TEMPERATURE: 98.4 F | BODY MASS INDEX: 23.22 KG/M2 | SYSTOLIC BLOOD PRESSURE: 96 MMHG | RESPIRATION RATE: 16 BRPM

## 2020-08-20 PROBLEM — J44.1 COPD EXACERBATION (HCC): Status: RESOLVED | Noted: 2019-03-28 | Resolved: 2020-08-20

## 2020-08-20 PROCEDURE — 1090F PRES/ABSN URINE INCON ASSESS: CPT | Performed by: INTERNAL MEDICINE

## 2020-08-20 PROCEDURE — 99213 OFFICE O/P EST LOW 20 MIN: CPT | Performed by: INTERNAL MEDICINE

## 2020-08-20 PROCEDURE — 1123F ACP DISCUSS/DSCN MKR DOCD: CPT | Performed by: INTERNAL MEDICINE

## 2020-08-20 PROCEDURE — 4004F PT TOBACCO SCREEN RCVD TLK: CPT | Performed by: INTERNAL MEDICINE

## 2020-08-20 PROCEDURE — G8399 PT W/DXA RESULTS DOCUMENT: HCPCS | Performed by: INTERNAL MEDICINE

## 2020-08-20 PROCEDURE — G8420 CALC BMI NORM PARAMETERS: HCPCS | Performed by: INTERNAL MEDICINE

## 2020-08-20 PROCEDURE — 4040F PNEUMOC VAC/ADMIN/RCVD: CPT | Performed by: INTERNAL MEDICINE

## 2020-08-20 PROCEDURE — G0296 VISIT TO DETERM LDCT ELIG: HCPCS | Performed by: INTERNAL MEDICINE

## 2020-08-20 PROCEDURE — 3023F SPIROM DOC REV: CPT | Performed by: INTERNAL MEDICINE

## 2020-08-20 PROCEDURE — 3017F COLORECTAL CA SCREEN DOC REV: CPT | Performed by: INTERNAL MEDICINE

## 2020-08-20 PROCEDURE — G8926 SPIRO NO PERF OR DOC: HCPCS | Performed by: INTERNAL MEDICINE

## 2020-08-20 PROCEDURE — G8427 DOCREV CUR MEDS BY ELIG CLIN: HCPCS | Performed by: INTERNAL MEDICINE

## 2020-08-20 NOTE — ASSESSMENT & PLAN NOTE
Unfortunately, the patient is returned to smoking. She is up to 1.5 packs/day. She is currently in the process of moving causing significant stress. She is not ready to quit at this time. lung cancer screening due in one month. Ordered.

## 2020-08-20 NOTE — PROGRESS NOTES
REASON FOR CONSULTATION/CC: COPD          PCP: HECTOR Ricks CNP    HISTORY OF PRESENT ILLNESS: Gayla Hernandez is a 70y.o. year old female with a history of COPD who presents :              COPD  Having some wheezing. She is moving therefore having increased exposure to dust.   Breo daily using albuterol HFA and nebulizer. Tobacco abuse. Started smoking again. Back to 1.5 ppd. PAST MEDICAL HISTORY:  Past Medical History:   Diagnosis Date    Chronic pain syndrome     Colorectal polyps     COPD (chronic obstructive pulmonary disease) (HCC)     CTS (carpal tunnel syndrome)     BILATERAL    DDD (degenerative disc disease), lumbar     Depression     Family hx of colon cancer     Fibromyalgia     Hyperlipemia     IBS (irritable bowel syndrome)     Lumbar spondylosis     New onset type 2 diabetes mellitus [facility] 2/3/2020    Urticaria, chronic        PAST SURGICAL HISTORY:  Past Surgical History:   Procedure Laterality Date    CARPAL TUNNEL RELEASE Bilateral     CERVICAL POLYP REMOVAL  9/2004    INTRACAPSULAR CATARACT EXTRACTION Left 6/23/2020    Phacoemulsification with intraocular lens implant performed by Binu Murillo MD at [place] [id number] Right 7/14/2020    Phacoemulsification with intraocular lens implant performed by Binu Murillo MD at [place] [address]      patient denies        FAMILY HISTORY:  family history includes Alzheimer's Disease in her mother; Cancer in her father; Diabetes in her daughter; Heart Failure in her brother. SOCIAL HISTORY:   reports that she has been smoking cigarettes. She started smoking about 58 years ago. She has a 82.50 pack-year smoking history. She has never used smokeless tobacco.      ALLERGIES:  Patient has No Known Allergies.     REVIEW OF SYSTEMS:  Constitutional: Negative for fever   HENT: Negative for sore throat  Respiratory: ++ for dyspnea,+ cough  Cardiovascular: Negative for chest pain  Gastrointestinal: Negative for vomiting, diarrhea   Skin: Negative for rash  Psychiatric/Behavorial: Negative for anxiety     Objective:   PHYSICAL EXAM:  Blood pressure 96/64, pulse 64, temperature 98.4 °F (36.9 °C), temperature source Oral, resp. rate 16, height 5' 4\" (1.626 m), weight 136 lb (61.7 kg), SpO2 93 %, not currently breastfeeding.'  Gen: No distress. ENT:   Resp: No accessory muscle use. No crackles. + wheezes. No rhonchi. CV: Regular rate. Regular rhythm. No murmur or rub. No edema. Skin: Warm, dry, normal texture and turgor. No nodule on exposed extremities. M/S: No cyanosis. No clubbing. No joint deformity. Psych: Oriented x 3. No anxiety. Awake. Alert. Intact judgement and insight. Good Mood / Affect. Memory appears in tact       Current Outpatient Medications   Medication Sig Dispense Refill    Fluticasone furoate-vilanterol (BREO ELLIPTA) 200-25 MCG/INH AEPB inhaler Inhale 1 puff into the lungs daily 1 each 5    meloxicam (MOBIC) 15 MG tablet TAKE 1 TABLET BY MOUTH DAILY 30 tablet 1    oxyCODONE-acetaminophen (PERCOCET) 7.5-325 MG per tablet Take 1 tablet by mouth every 6 hours as needed for Pain for up to 28 days. Max 4 a day 112 tablet 0    traZODone (DESYREL) 50 MG tablet Take 1-2 tablets by mouth nightly 60 tablet 0    tiZANidine (ZANAFLEX) 4 MG tablet Take one tablet po BID 60 tablet 1    gabapentin (NEURONTIN) 600 MG tablet Take 1 tablet by mouth 2 times daily for 30 days.  60 tablet 1    DULoxetine (CYMBALTA) 60 MG extended release capsule Take one tablet at night 30 capsule 1    pantoprazole (PROTONIX) 40 MG tablet Take 1 tablet by mouth daily 30 tablet 1    atorvastatin (LIPITOR) 80 MG tablet TAKE 1 TABLET BY MOUTH EVERY DAY FOR CHOLESTEROL 90 tablet 1    Blood Glucose Monitoring Suppl (ONE TOUCH ULTRA MINI) w/Device KIT 1 kit by Does not apply route daily 1 kit 0    ONE TOUCH at this time. lung cancer screening due in one month. Ordered. COPD, severe ([address]     Having increasing symptoms of wheeze. This is secondary to returning to smoking 1.5 packs/day and is currently in the process of moving being exposed to more dust.  She expressed understanding for this. Refills of Breo given. Continue use albuterol HFA and nebulizer as needed. Relevant Medications    Fluticasone furoate-vilanterol (BREO ELLIPTA) 200-25 MCG/INH AEPB inhaler      Other Visit Diagnoses     Personal history of tobacco use    -  Primary    Relevant Orders    TX VISIT TO DISCUSS LUNG CA SCREEN W LDCT (Completed)    CT Lung Screen (Annual)        This note was transcribed using 3dplusme. Please disregard any translational errors. Low Dose CT (LDCT) Lung Screening criteria met   Age 50-69   Pack year smoking >30   Still smoking or less than 15 year since quit   No sign or symptoms of lung cancer   > 11 months since last LDCT     Risks and benefits of lung cancer screening with LDCT scans discussed:    Significance of positive screen - False-positive LDCT results often occur. 95% of all positive results do not lead to a diagnosis of cancer. Usually further imaging can resolve most false-positive results; however, some patients may require invasive procedures. Over diagnosis risk - 10% to 12% of screen-detected lung cancer cases are over diagnosed--that is, the cancer would not have been detected in the patient's lifetime without the screening. Need for follow up screens annually to continue lung cancer screening effectiveness     Risks associated with radiation from annual LDCT- Radiation exposure is about the same as for a mammogram, which is about 1/3 of the annual background radiation exposure from everyday life. Starting screening at age 54 is not likely to increase cancer risk from radiation exposure.     Patients with comorbidities resulting in life expectancy of < 10 years, or that would preclude treatment of an abnormality identified on CT, should not be screened due to lack of benefit.     To obtain maximal benefit from this screening, smoking cessation and long-term abstinence from smoking is critical

## 2020-08-20 NOTE — ASSESSMENT & PLAN NOTE
Having increasing symptoms of wheeze. This is secondary to returning to smoking 1.5 packs/day and is currently in the process of moving being exposed to more dust.  She expressed understanding for this. Refills of Breo given. Continue use albuterol HFA and nebulizer as needed.

## 2020-08-25 ENCOUNTER — TELEMEDICINE (OUTPATIENT)
Dept: PAIN MANAGEMENT | Age: 75
End: 2020-08-25
Payer: MEDICARE

## 2020-08-25 PROCEDURE — G8427 DOCREV CUR MEDS BY ELIG CLIN: HCPCS | Performed by: INTERNAL MEDICINE

## 2020-08-25 PROCEDURE — 1123F ACP DISCUSS/DSCN MKR DOCD: CPT | Performed by: INTERNAL MEDICINE

## 2020-08-25 PROCEDURE — 4040F PNEUMOC VAC/ADMIN/RCVD: CPT | Performed by: INTERNAL MEDICINE

## 2020-08-25 PROCEDURE — 1090F PRES/ABSN URINE INCON ASSESS: CPT | Performed by: INTERNAL MEDICINE

## 2020-08-25 PROCEDURE — 99213 OFFICE O/P EST LOW 20 MIN: CPT | Performed by: INTERNAL MEDICINE

## 2020-08-25 PROCEDURE — G8399 PT W/DXA RESULTS DOCUMENT: HCPCS | Performed by: INTERNAL MEDICINE

## 2020-08-25 PROCEDURE — 3017F COLORECTAL CA SCREEN DOC REV: CPT | Performed by: INTERNAL MEDICINE

## 2020-08-25 RX ORDER — TIZANIDINE 4 MG/1
TABLET ORAL
Qty: 60 TABLET | Refills: 1 | Status: SHIPPED | OUTPATIENT
Start: 2020-08-25 | End: 2020-09-22 | Stop reason: SDUPTHER

## 2020-08-25 RX ORDER — TRAZODONE HYDROCHLORIDE 50 MG/1
50-100 TABLET ORAL NIGHTLY
Qty: 60 TABLET | Refills: 0 | Status: SHIPPED | OUTPATIENT
Start: 2020-08-25 | End: 2020-09-22 | Stop reason: SDUPTHER

## 2020-08-25 RX ORDER — OXYCODONE AND ACETAMINOPHEN 7.5; 325 MG/1; MG/1
1 TABLET ORAL EVERY 6 HOURS PRN
Qty: 112 TABLET | Refills: 0 | Status: SHIPPED | OUTPATIENT
Start: 2020-08-25 | End: 2020-09-22 | Stop reason: SDUPTHER

## 2020-08-25 RX ORDER — DULOXETIN HYDROCHLORIDE 60 MG/1
CAPSULE, DELAYED RELEASE ORAL
Qty: 30 CAPSULE | Refills: 1 | Status: SHIPPED | OUTPATIENT
Start: 2020-08-25 | End: 2020-09-22 | Stop reason: SDUPTHER

## 2020-08-25 RX ORDER — PANTOPRAZOLE SODIUM 40 MG/1
40 TABLET, DELAYED RELEASE ORAL DAILY
Qty: 30 TABLET | Refills: 1 | Status: SHIPPED | OUTPATIENT
Start: 2020-08-25 | End: 2020-09-22 | Stop reason: SDUPTHER

## 2020-08-25 RX ORDER — GABAPENTIN 600 MG/1
600 TABLET ORAL 2 TIMES DAILY
Qty: 60 TABLET | Refills: 1 | Status: SHIPPED | OUTPATIENT
Start: 2020-08-25 | End: 2020-09-22 | Stop reason: SDUPTHER

## 2020-08-25 NOTE — PROGRESS NOTES
TELE HEALTH VISIT (AUDIO-VISUAL)    Pursuant to the emergency declaration under the [facility], [facility] waiver authority and the [employer] Act, this Virtual  Visit was conducted, with patient's/legal guardian's consent, to reduce the patient's risk of exposure to COVID-19 and provide continuity of care for an established patient. Service is  provided through a video synchronous discussion virtually to substitute for in-person clinic visit. Due to this being a TeleHealth encounter (During OVYZV-08 public health emergency), evaluation of the following organ systems was limited: Vitals/Constitutional/EENT/Resp/CV/GI//MS/Neuro/Skin/Iogw-Lltz-Wrc. Micah Akhtar  1945  <[id number]>    Ms. Yony Palacio is being seen virtually for a follow up visit using one of the following techniques  Google Duo  Informed verbal consent to the virtual visit was obtained from Ms. Yony Palacio. Risks associated with HIPPA compliance with the virtual visit was explained to the patient. Ms. Yony Palacio is at her residence and Dr. Beena Desouza is in his office. HISTORY OF PRESENT ILLNESS:  Ms. Yony Palacio is a 70 y.o. female  being assessed for a follow up visit for pain management for evaluation of ongoing care regarding her symptoms and monitoring of compliance with long term use high risk medications. She has a diagnosis of   1. Chronic pain syndrome    2. Fibromyalgia    3. DDD (degenerative disc disease), lumbar    4. Osteoarthritis of spine with radiculopathy, lumbar region    5. Lumbar spondylosis    6. Degeneration of lumbar or lumbosacral intervertebral disc    . She complains of pain in the lower back  with radiation to the buttocks . She rates the pain 5/10 and describes it as aching. Current treatment regimen has helped relieve about 50% of the pain. She denies any side effects from the current pain regimen.  Patient reports that since the last follow MISC 1 each by Does not apply route daily 100 each 3    blood glucose test strips (ASCENSIA AUTODISC VI;ONE TOUCH ULTRA TEST VI) strip 1 each by In Vitro route daily 100 each 3    albuterol sulfate  (90 Base) MCG/ACT inhaler Inhale 2 puffs into the lungs every 6 hours as needed for Shortness of Breath 1 Inhaler 2    ipratropium-albuterol (DUONEB) 0.5-2.5 (3) MG/3ML SOLN nebulizer solution Take 1 aerosol every 4 hours for 2 days and then as needed for shortness of breath coughing and wheezing 360 mL 3    Cholecalciferol (VITAMIN D) 2000 units TABS tablet Take 1 tablet by mouth daily 30 tablet     cetirizine (ZYRTEC) 10 MG tablet Take 10 mg by mouth daily as needed for Allergies      Nebulizers (COMPRESSOR/NEBULIZER) MISC 1 Units by Does not apply route 4 times daily 1 each 3     No facility-administered medications prior to visit. SOCIAL/FAMILY/PAST MEDICAL HISTORY: Ms. Allen Meghan, family and past medical history was reviewed. REVIEW OF SYSTEMS:    Respiratory: Negative for apnea, chest tightness and shortness of breath or change in baseline breathing. Gastrointestinal: Negative for nausea, vomiting, abdominal pain, diarrhea, constipation, blood in stool and abdominal distention. PHYSICAL EXAM:   Nursing note and vitals per patient reviewed. There were no vitals taken for this visit. Constitutional: She appears well-developed and well-nourished. No acute distress. No respiratory distress. Skin: Skin appears to be warm and dry. No rashes or any other marks noted. She is not diaphoretic. Respiratory/Pulmonary: NO conversational dyspnea, no accessory muscle use, no coughing during exam. She does not appear to be in labored breathing. Neurological/Psychiatric:She is alert and oriented to person, place, and time. Coordination is  normal.  Her mood isAppropriate and affect is Neutral/Euthymic(normal).    Musculoskeletal / Extremities: Range of motion is normal. Gait is normal, assistive devices use: none. IMPRESSION:   1. Chronic pain syndrome    2. Fibromyalgia    3. DDD (degenerative disc disease), lumbar    4. Osteoarthritis of spine with radiculopathy, lumbar region    5. Lumbar spondylosis    6. Degeneration of lumbar or lumbosacral intervertebral disc    7. Trochanteric bursitis of right hip        PLAN:  Informed verbal consent regarding treatment was obtained  -[place] record was obtained and reviewed  for the last one year and no indicators of drug misuse  were found. Any other controlled substance prescriptions  seen on the record have been accounted for, I am aware of the patient receiving these medications. Collette Ruts [place] record will be rechecked as part of office protocol.    -Continue with current opioid regimen Percocet 4 per day   -She was advised to increase fluids ( 5-7  glasses of fluid daily), limit caffeine, avoid cheese products, increase dietary fiber, increase activity and exercise as tolerated and relax regularly and enjoy meals   -Adv Biofeedback, relaxation and meditation techniques. Referral to psychologist for CBT was also discussed with patient   -Advised caffeine reduction, dietary changes, elevate head end of bed, NPO after supper, if using alcohol advised reduction of alcohol intake, tobacco cessation if smoking, weight loss    Current Outpatient Medications   Medication Sig Dispense Refill    Fluticasone furoate-vilanterol (BREO ELLIPTA) 200-25 MCG/INH AEPB inhaler Inhale 1 puff into the lungs daily 1 each 5    meloxicam (MOBIC) 15 MG tablet TAKE 1 TABLET BY MOUTH DAILY 30 tablet 1    oxyCODONE-acetaminophen (PERCOCET) 7.5-325 MG per tablet Take 1 tablet by mouth every 6 hours as needed for Pain for up to 28 days.  Max 4 a day 112 tablet 0    traZODone (DESYREL) 50 MG tablet Take 1-2 tablets by mouth nightly 60 tablet 0    tiZANidine (ZANAFLEX) 4 MG tablet Take one tablet po BID 60 tablet 1    gabapentin (NEURONTIN) 600 MG tablet Take 1 tablet by mouth 2 were also explained to the patient. Informed verbal consent was obtained. Goals of current treatment regimen include improvement in pain, restoration of functioning- with focus on improvement in physical performance, general activity, work or disability,emotional distress, health care utilization and  decreased medication consumption. Will continue to monitor progress towards achieving/maintaining therapeutic goals with special emphasis on  1. Improvement in perceived interfernce  of pain with ADL's. Ability to do home exercises independently. Ability to do household chores indoor and/or outdoor work and social and leisure activities. Improve psychosocial and physical functioning.- she is showing progression towards this treatment goal with the current regimen. She was advised against drinking alcohol with the narcotic pain medicines, advised against driving or handling machinery while adjusting the dose of medicines or if having cognitive  issues related to the current medications. Risk of overdose and death, if medicines not taken as prescribed, were also discussed. If the patient develops new symptoms or if the symptoms worsen, the patient should call the office. While transcribing every attempt was made to maintain the accuracy of the note in terms of it's contents,there may have been some errors made inadvertently. Thank you for allowing me to participate in the care of this patient.     Marely Williamson MD.    Cc: HECTOR Valdovinos - CNP

## 2020-09-22 ENCOUNTER — VIRTUAL VISIT (OUTPATIENT)
Dept: PAIN MANAGEMENT | Age: 75
End: 2020-09-22
Payer: MEDICARE

## 2020-09-22 PROCEDURE — G8427 DOCREV CUR MEDS BY ELIG CLIN: HCPCS | Performed by: INTERNAL MEDICINE

## 2020-09-22 PROCEDURE — 4040F PNEUMOC VAC/ADMIN/RCVD: CPT | Performed by: INTERNAL MEDICINE

## 2020-09-22 PROCEDURE — 3017F COLORECTAL CA SCREEN DOC REV: CPT | Performed by: INTERNAL MEDICINE

## 2020-09-22 PROCEDURE — 1123F ACP DISCUSS/DSCN MKR DOCD: CPT | Performed by: INTERNAL MEDICINE

## 2020-09-22 PROCEDURE — 99213 OFFICE O/P EST LOW 20 MIN: CPT | Performed by: INTERNAL MEDICINE

## 2020-09-22 PROCEDURE — 1090F PRES/ABSN URINE INCON ASSESS: CPT | Performed by: INTERNAL MEDICINE

## 2020-09-22 PROCEDURE — G8399 PT W/DXA RESULTS DOCUMENT: HCPCS | Performed by: INTERNAL MEDICINE

## 2020-09-22 RX ORDER — GABAPENTIN 600 MG/1
600 TABLET ORAL 2 TIMES DAILY
Qty: 60 TABLET | Refills: 1 | Status: SHIPPED | OUTPATIENT
Start: 2020-09-22 | End: 2020-10-20 | Stop reason: SDUPTHER

## 2020-09-22 RX ORDER — DULOXETIN HYDROCHLORIDE 60 MG/1
CAPSULE, DELAYED RELEASE ORAL
Qty: 30 CAPSULE | Refills: 1 | Status: SHIPPED | OUTPATIENT
Start: 2020-09-22 | End: 2020-10-20 | Stop reason: SDUPTHER

## 2020-09-22 RX ORDER — TIZANIDINE 4 MG/1
TABLET ORAL
Qty: 60 TABLET | Refills: 1 | Status: SHIPPED | OUTPATIENT
Start: 2020-09-22 | End: 2020-10-20 | Stop reason: SDUPTHER

## 2020-09-22 RX ORDER — OXYCODONE AND ACETAMINOPHEN 7.5; 325 MG/1; MG/1
1 TABLET ORAL EVERY 6 HOURS PRN
Qty: 112 TABLET | Refills: 0 | Status: SHIPPED | OUTPATIENT
Start: 2020-09-22 | End: 2020-10-20 | Stop reason: SDUPTHER

## 2020-09-22 RX ORDER — MELOXICAM 15 MG/1
TABLET ORAL
Qty: 30 TABLET | Refills: 1 | Status: SHIPPED | OUTPATIENT
Start: 2020-09-22 | End: 2020-10-20 | Stop reason: SDUPTHER

## 2020-09-22 RX ORDER — PANTOPRAZOLE SODIUM 40 MG/1
40 TABLET, DELAYED RELEASE ORAL DAILY
Qty: 30 TABLET | Refills: 1 | Status: SHIPPED | OUTPATIENT
Start: 2020-09-22 | End: 2020-10-20 | Stop reason: SDUPTHER

## 2020-09-22 RX ORDER — TRAZODONE HYDROCHLORIDE 50 MG/1
50-100 TABLET ORAL NIGHTLY
Qty: 60 TABLET | Refills: 1 | Status: SHIPPED | OUTPATIENT
Start: 2020-09-22 | End: 2020-10-20 | Stop reason: SDUPTHER

## 2020-09-22 NOTE — PROGRESS NOTES
TELE HEALTH VISIT (AUDIO-VISUAL)    Pursuant to the emergency declaration under the [facility], [facility] waiver authority and the [employer] Act, this Virtual  Visit was conducted, with patient's/legal guardian's consent, to reduce the patient's risk of exposure to COVID-19 and provide continuity of care for an established patient. Service is  provided through a video synchronous discussion virtually to substitute for in-person clinic visit. Due to this being a TeleHealth encounter (During BSMEG-66 public health emergency), evaluation of the following organ systems was limited: Vitals/Constitutional/EENT/Resp/CV/GI//MS/Neuro/Skin/Lqvk-Syrz-Fmd. Tika Andres  1945  <[id number]>    Ms. Echo Edwards is being seen virtually for a follow up visit using one of the following techniques  Google Duo  Informed verbal consent to the virtual visit was obtained from Ms. Echo Edwards. Risks associated with HIPPA compliance with the virtual visit was explained to the patient. Ms. Echo Edwards is at her residence and Dr. Karen Bolanos is in his office. HISTORY OF PRESENT ILLNESS:  Ms. Echo Edwards is a 70 y.o. female  being assessed for a follow up visit for pain management for evaluation of ongoing care regarding her symptoms and monitoring of compliance with long term use high risk medications. She has a diagnosis of   1. Chronic pain syndrome    2. Fibromyalgia    3. DDD (degenerative disc disease), lumbar    4. Osteoarthritis of spine with radiculopathy, lumbar region    5. Lumbar spondylosis    6. Degeneration of lumbar or lumbosacral intervertebral disc    7. Trochanteric bursitis of right hip    . She complains of pain in the lower back  with radiation to the buttocks . She rates the pain 8/10 and describes it as aching. Current treatment regimen has helped relieve about 30% of the pain. She denies any side effects from the current pain regimen. Patient reports that since the last follow up visit the physical functioning is unchanged, family/social relationships are unchanged, mood is unchanged sleep patterns are unchanged, and that the overall functioning is unchanged. Patient denies misusing/abusing her narcotic pain medications or using any illegal drugs. There are No indicators for possible drug abuse, addiction or diversion problems. Ms. Natalia Wagner states she has been doing fair, she is feeling sore. She states she is moving her residence, she is doing a lot of bending and twisting. Patient states she has been using Percocet 4 per day along with all other adjuvants as before. Denies abdominal pain, dysphagia, gas bloat, heartburn, nausea, odynophagia, regurgitation, vomiting and water brash-GERD Sxs are controlled  with the medications, she is on Protonix. Patient reports she is managing her ADL's. ALLERGIES: Patients list of allergies were reviewed     MEDICATIONS: Ms. Natalia Wagner list of medications were reviewed. Her current medications are   Outpatient Medications Prior to Visit   Medication Sig Dispense Refill    oxyCODONE-acetaminophen (PERCOCET) 7.5-325 MG per tablet Take 1 tablet by mouth every 6 hours as needed for Pain for up to 28 days. Max 4 a day 112 tablet 0    traZODone (DESYREL) 50 MG tablet Take 1-2 tablets by mouth nightly 60 tablet 0    tiZANidine (ZANAFLEX) 4 MG tablet Take one tablet po BID 60 tablet 1    gabapentin (NEURONTIN) 600 MG tablet Take 1 tablet by mouth 2 times daily for 30 days.  60 tablet 1    DULoxetine (CYMBALTA) 60 MG extended release capsule Take one tablet at night 30 capsule 1    pantoprazole (PROTONIX) 40 MG tablet Take 1 tablet by mouth daily 30 tablet 1    Fluticasone furoate-vilanterol (BREO ELLIPTA) 200-25 MCG/INH AEPB inhaler Inhale 1 puff into the lungs daily 1 each 5    meloxicam (MOBIC) 15 MG tablet TAKE 1 TABLET BY MOUTH DAILY 30 tablet 1    atorvastatin (LIPITOR) 80 MG tablet TAKE 1 TABLET BY MOUTH time. Coordination is  normal.  Her mood isAppropriate and affect is Neutral/Euthymic(normal). Musculoskeletal / Extremities: Range of motion is normal. Gait is normal, assistive devices use: none. IMPRESSION:   1. Chronic pain syndrome    2. Fibromyalgia    3. DDD (degenerative disc disease), lumbar    4. Osteoarthritis of spine with radiculopathy, lumbar region    5. Lumbar spondylosis    6. Degeneration of lumbar or lumbosacral intervertebral disc    7. Trochanteric bursitis of right hip        PLAN:  Informed verbal consent regarding treatment was obtained  -continue with current opioid regimen Percocet 4 per day  -She was advised to increase fluids ( 5-7  glasses of fluid daily), limit caffeine, avoid cheese products, increase dietary fiber, increase activity and exercise as tolerated and relax regularly and enjoy meals   -walking/stretching exercises as advised    -pulmonary rehab exercises as advised  -Advised patient to quit smoking for  health related concerns and to improve the treatment outcomes. Education was given on quitting smoking and the use of different modalities including medications, hypnotherapy, counselling  and biofeedback. These were discussed with patient. Current Outpatient Medications   Medication Sig Dispense Refill    oxyCODONE-acetaminophen (PERCOCET) 7.5-325 MG per tablet Take 1 tablet by mouth every 6 hours as needed for Pain for up to 28 days. Max 4 a day 112 tablet 0    traZODone (DESYREL) 50 MG tablet Take 1-2 tablets by mouth nightly 60 tablet 0    tiZANidine (ZANAFLEX) 4 MG tablet Take one tablet po BID 60 tablet 1    gabapentin (NEURONTIN) 600 MG tablet Take 1 tablet by mouth 2 times daily for 30 days.  60 tablet 1    DULoxetine (CYMBALTA) 60 MG extended release capsule Take one tablet at night 30 capsule 1    pantoprazole (PROTONIX) 40 MG tablet Take 1 tablet by mouth daily 30 tablet 1    Fluticasone furoate-vilanterol (BREO ELLIPTA) 200-25 MCG/INH AEPB inhaler Inhale 1 puff into the lungs daily 1 each 5    meloxicam (MOBIC) 15 MG tablet TAKE 1 TABLET BY MOUTH DAILY 30 tablet 1    atorvastatin (LIPITOR) 80 MG tablet TAKE 1 TABLET BY MOUTH EVERY DAY FOR CHOLESTEROL 90 tablet 1    Blood Glucose Monitoring Suppl (ONE TOUCH ULTRA MINI) w/Device KIT 1 kit by Does not apply route daily 1 kit 0    ONE TOUCH LANCETS MISC 1 each by Does not apply route daily 100 each 3    blood glucose test strips (ASCENSIA AUTODISC VI;ONE TOUCH ULTRA TEST VI) strip 1 each by In Vitro route daily 100 each 3    albuterol sulfate  (90 Base) MCG/ACT inhaler Inhale 2 puffs into the lungs every 6 hours as needed for Shortness of Breath 1 Inhaler 2    ipratropium-albuterol (DUONEB) 0.5-2.5 (3) MG/3ML SOLN nebulizer solution Take 1 aerosol every 4 hours for 2 days and then as needed for shortness of breath coughing and wheezing 360 mL 3    Cholecalciferol (VITAMIN D) 2000 units TABS tablet Take 1 tablet by mouth daily 30 tablet     cetirizine (ZYRTEC) 10 MG tablet Take 10 mg by mouth daily as needed for Allergies      Nebulizers (COMPRESSOR/NEBULIZER) MISC 1 Units by Does not apply route 4 times daily 1 each 3     No current facility-administered medications for this visit. I will continue her current medication regimen  which is part of the above treatment schedule. It has been helping with Ms. Botello Cease chronic  medical problems which for this visit include:   Diagnoses of Chronic pain syndrome, Fibromyalgia, DDD (degenerative disc disease), lumbar, Osteoarthritis of spine with radiculopathy, lumbar region, Lumbar spondylosis, Degeneration of lumbar or lumbosacral intervertebral disc, and Trochanteric bursitis of right hip were pertinent to this visit. Risks and benefits of the medications and other alternative treatments  including no treatment were discussed with the patient. The common side effects of these medications were also explained to the patient.   Informed verbal consent was obtained. Goals of current treatment regimen include improvement in pain, restoration of functioning- with focus on improvement in physical performance, general activity, work or disability,emotional distress, health care utilization and  decreased medication consumption. Will continue to monitor progress towards achieving/maintaining therapeutic goals with special emphasis on  1. Improvement in perceived interfernce  of pain with ADL's. Ability to do home exercises independently. Ability to do household chores indoor and/or outdoor work and social and leisure activities. Improve psychosocial and physical functioning. - she is showing progression towards this treatment goal with the current regimen. She was advised against drinking alcohol with the narcotic pain medicines, advised against driving or handling machinery while adjusting the dose of medicines or if having cognitive  issues related to the current medications. Risk of overdose and death, if medicines not taken as prescribed, were also discussed. If the patient develops new symptoms or if the symptoms worsen, the patient should call the office. While transcribing every attempt was made to maintain the accuracy of the note in terms of it's contents,there may have been some errors made inadvertently. Thank you for allowing me to participate in the care of this patient.     Loretta Vazquez MD.    Cc: Camilla Cornelius, HECTOR - CNP

## 2020-10-20 ENCOUNTER — VIRTUAL VISIT (OUTPATIENT)
Dept: PAIN MANAGEMENT | Age: 75
End: 2020-10-20
Payer: MEDICARE

## 2020-10-20 PROCEDURE — 1123F ACP DISCUSS/DSCN MKR DOCD: CPT | Performed by: INTERNAL MEDICINE

## 2020-10-20 PROCEDURE — 3017F COLORECTAL CA SCREEN DOC REV: CPT | Performed by: INTERNAL MEDICINE

## 2020-10-20 PROCEDURE — G8427 DOCREV CUR MEDS BY ELIG CLIN: HCPCS | Performed by: INTERNAL MEDICINE

## 2020-10-20 PROCEDURE — 4040F PNEUMOC VAC/ADMIN/RCVD: CPT | Performed by: INTERNAL MEDICINE

## 2020-10-20 PROCEDURE — 99213 OFFICE O/P EST LOW 20 MIN: CPT | Performed by: INTERNAL MEDICINE

## 2020-10-20 PROCEDURE — G8399 PT W/DXA RESULTS DOCUMENT: HCPCS | Performed by: INTERNAL MEDICINE

## 2020-10-20 PROCEDURE — 1090F PRES/ABSN URINE INCON ASSESS: CPT | Performed by: INTERNAL MEDICINE

## 2020-10-20 RX ORDER — OXYCODONE AND ACETAMINOPHEN 7.5; 325 MG/1; MG/1
1 TABLET ORAL EVERY 6 HOURS PRN
Qty: 112 TABLET | Refills: 0 | Status: SHIPPED | OUTPATIENT
Start: 2020-10-20 | End: 2020-11-17 | Stop reason: SDUPTHER

## 2020-10-20 RX ORDER — PANTOPRAZOLE SODIUM 40 MG/1
40 TABLET, DELAYED RELEASE ORAL DAILY
Qty: 30 TABLET | Refills: 1 | Status: SHIPPED | OUTPATIENT
Start: 2020-10-20 | End: 2020-11-17 | Stop reason: SDUPTHER

## 2020-10-20 RX ORDER — GABAPENTIN 600 MG/1
600 TABLET ORAL 2 TIMES DAILY
Qty: 60 TABLET | Refills: 1 | Status: SHIPPED | OUTPATIENT
Start: 2020-10-20 | End: 2020-11-17 | Stop reason: SDUPTHER

## 2020-10-20 RX ORDER — MELOXICAM 15 MG/1
TABLET ORAL
Qty: 30 TABLET | Refills: 1 | Status: SHIPPED | OUTPATIENT
Start: 2020-10-20 | End: 2020-11-17 | Stop reason: SDUPTHER

## 2020-10-20 RX ORDER — TIZANIDINE 4 MG/1
TABLET ORAL
Qty: 60 TABLET | Refills: 1 | Status: SHIPPED | OUTPATIENT
Start: 2020-10-20 | End: 2020-11-17

## 2020-10-20 RX ORDER — TRAZODONE HYDROCHLORIDE 50 MG/1
50-100 TABLET ORAL NIGHTLY
Qty: 60 TABLET | Refills: 1 | Status: SHIPPED | OUTPATIENT
Start: 2020-10-20 | End: 2020-11-17 | Stop reason: SDUPTHER

## 2020-10-20 RX ORDER — DULOXETIN HYDROCHLORIDE 60 MG/1
CAPSULE, DELAYED RELEASE ORAL
Qty: 30 CAPSULE | Refills: 1 | Status: SHIPPED | OUTPATIENT
Start: 2020-10-20 | End: 2020-11-17 | Stop reason: SDUPTHER

## 2020-10-20 NOTE — PROGRESS NOTES
TELE HEALTH VISIT (AUDIO-VISUAL)    Pursuant to the emergency declaration under the [facility], [facility] waiver authority and the [employer] Act, this Virtual  Visit was conducted, with patient's/legal guardian's consent, to reduce the patient's risk of exposure to COVID-19 and provide continuity of care for an established patient. Service is  provided through a video synchronous discussion virtually to substitute for in-person clinic visit. Due to this being a TeleHealth encounter (During OFXJG-28 public health emergency), evaluation of the following organ systems was limited: Vitals/Constitutional/EENT/Resp/CV/GI//MS/Neuro/Skin/Uwmx-Dlce-Gbp. Dylan Flank  1945  <[id number]>    Ms. Doris Lee is being seen virtually for a follow up visit using one of the following techniques  Google Duo  Informed verbal consent to the virtual visit was obtained from Ms. Doris Lee. Risks associated with HIPPA compliance with the virtual visit was explained to the patient. Ms. Doris Lee is at her residence and Dr. Meera Whitehead is in his office. HISTORY OF PRESENT ILLNESS:  Ms. Doris Lee is a 70 y.o. female  being assessed for a follow up visit for pain management for evaluation of ongoing care regarding her symptoms and monitoring of compliance with long term use high risk medications. She has a diagnosis of   1. Chronic pain syndrome    2. Fibromyalgia    3. DDD (degenerative disc disease), lumbar    4. Degeneration of lumbar or lumbosacral intervertebral disc    5. Lumbar spondylosis    6. Trochanteric bursitis of right hip    7. Osteoarthritis of spine with radiculopathy, lumbar region    . She complains of pain in the lower back  with radiation to the buttocks, hips Right, upper leg Right, knees Right, lower leg Right and ankles Right . She rates the pain 6/10 and describes it as sharp, aching.  Current treatment regimen has helped relieve about 50% of the pain. She denies any side effects from the current pain regimen. Patient reports that since the last follow up visit the physical functioning is unchanged, family/social relationships are unchanged, mood is unchanged sleep patterns are unchanged, and that the overall functioning is unchanged. Patient denies misusing/abusing her narcotic pain medications or using any illegal drugs. There are No indicators for possible drug abuse, addiction or diversion problems. Ms. Aram Blanco states she has been doing good, pain has been manageable. She mentions she has been using all her medications. Patient reports her breathing has been good, she says she is smoking more than a pack per day. She states she is managing her house chores. ALLERGIES: Patients list of allergies were reviewed     MEDICATIONS: Ms. Aram Blanco list of medications were reviewed. Her current medications are   Outpatient Medications Prior to Visit   Medication Sig Dispense Refill    oxyCODONE-acetaminophen (PERCOCET) 7.5-325 MG per tablet Take 1 tablet by mouth every 6 hours as needed for Pain for up to 28 days. Max 4 a day 112 tablet 0    traZODone (DESYREL) 50 MG tablet Take 1-2 tablets by mouth nightly 60 tablet 1    tiZANidine (ZANAFLEX) 4 MG tablet Take one tablet po BID 60 tablet 1    gabapentin (NEURONTIN) 600 MG tablet Take 1 tablet by mouth 2 times daily for 30 days.  60 tablet 1    DULoxetine (CYMBALTA) 60 MG extended release capsule Take one tablet at night 30 capsule 1    pantoprazole (PROTONIX) 40 MG tablet Take 1 tablet by mouth daily 30 tablet 1    meloxicam (MOBIC) 15 MG tablet TAKE 1 TABLET BY MOUTH DAILY 30 tablet 1    Fluticasone furoate-vilanterol (BREO ELLIPTA) 200-25 MCG/INH AEPB inhaler Inhale 1 puff into the lungs daily 1 each 5    atorvastatin (LIPITOR) 80 MG tablet TAKE 1 TABLET BY MOUTH EVERY DAY FOR CHOLESTEROL 90 tablet 1    Blood Glucose Monitoring Suppl (ONE TOUCH ULTRA MINI) w/Device KIT 1 kit by Does Extremities: Range of motion is normal. Gait is normal, assistive devices use: none. IMPRESSION:   1. Chronic pain syndrome    2. Fibromyalgia    3. DDD (degenerative disc disease), lumbar    4. Degeneration of lumbar or lumbosacral intervertebral disc    5. Lumbar spondylosis    6. Trochanteric bursitis of right hip    7. Osteoarthritis of spine with radiculopathy, lumbar region        PLAN:  Informed verbal consent regarding treatment was obtained  -continue with current opioid regimen Percocet 4 per day  -Adv Biofeedback, relaxation and meditation techniques. Referral to psychologist for CBT was also discussed with patient   -She was advised to increase fluids ( 5-7  glasses of fluid daily), limit caffeine, avoid cheese products, increase dietary fiber, increase activity and exercise as tolerated and relax regularly and enjoy meals   -walking/stretching exercises as advised    -Advised patient to quit smoking for  health related concerns and to improve the treatment outcomes. Education was given on quitting smoking and the use of different modalities including medications, hypnotherapy, counselling  and biofeedback. These were discussed with patient. Current Outpatient Medications   Medication Sig Dispense Refill    oxyCODONE-acetaminophen (PERCOCET) 7.5-325 MG per tablet Take 1 tablet by mouth every 6 hours as needed for Pain for up to 28 days. Max 4 a day 112 tablet 0    traZODone (DESYREL) 50 MG tablet Take 1-2 tablets by mouth nightly 60 tablet 1    tiZANidine (ZANAFLEX) 4 MG tablet Take one tablet po BID 60 tablet 1    gabapentin (NEURONTIN) 600 MG tablet Take 1 tablet by mouth 2 times daily for 30 days.  60 tablet 1    DULoxetine (CYMBALTA) 60 MG extended release capsule Take one tablet at night 30 capsule 1    pantoprazole (PROTONIX) 40 MG tablet Take 1 tablet by mouth daily 30 tablet 1    meloxicam (MOBIC) 15 MG tablet TAKE 1 TABLET BY MOUTH DAILY 30 tablet 1    Fluticasone Goals of current treatment regimen include improvement in pain, restoration of functioning- with focus on improvement in physical performance, general activity, work or disability,emotional distress, health care utilization and  decreased medication consumption. Will continue to monitor progress towards achieving/maintaining therapeutic goals with special emphasis on  1. Improvement in perceived interfernce  of pain with ADL's. Ability to do home exercises independently. Ability to do household chores indoor and/or outdoor work and social and leisure activities. Improve psychosocial and physical functioning. - she is showing progression towards this treatment goal with the current regimen. She was advised against drinking alcohol with the narcotic pain medicines, advised against driving or handling machinery while adjusting the dose of medicines or if having cognitive  issues related to the current medications. Risk of overdose and death, if medicines not taken as prescribed, were also discussed. If the patient develops new symptoms or if the symptoms worsen, the patient should call the office. While transcribing every attempt was made to maintain the accuracy of the note in terms of it's contents,there may have been some errors made inadvertently. Thank you for allowing me to participate in the care of this patient.     Edison Diaz MD.    Cc: HECTOR Munoz - CNP

## 2020-11-10 RX ORDER — ATORVASTATIN CALCIUM 80 MG/1
TABLET, FILM COATED ORAL
Qty: 30 TABLET | Refills: 0 | Status: SHIPPED | OUTPATIENT
Start: 2020-11-10 | End: 2020-12-04

## 2020-11-17 ENCOUNTER — OFFICE VISIT (OUTPATIENT)
Dept: PAIN MANAGEMENT | Age: 75
End: 2020-11-17
Payer: MEDICARE

## 2020-11-17 VITALS
WEIGHT: 129 LBS | DIASTOLIC BLOOD PRESSURE: 64 MMHG | TEMPERATURE: 98 F | HEART RATE: 67 BPM | SYSTOLIC BLOOD PRESSURE: 103 MMHG | BODY MASS INDEX: 22.14 KG/M2

## 2020-11-17 PROCEDURE — G8427 DOCREV CUR MEDS BY ELIG CLIN: HCPCS | Performed by: INTERNAL MEDICINE

## 2020-11-17 PROCEDURE — 1090F PRES/ABSN URINE INCON ASSESS: CPT | Performed by: INTERNAL MEDICINE

## 2020-11-17 PROCEDURE — 4040F PNEUMOC VAC/ADMIN/RCVD: CPT | Performed by: INTERNAL MEDICINE

## 2020-11-17 PROCEDURE — 3017F COLORECTAL CA SCREEN DOC REV: CPT | Performed by: INTERNAL MEDICINE

## 2020-11-17 PROCEDURE — G8399 PT W/DXA RESULTS DOCUMENT: HCPCS | Performed by: INTERNAL MEDICINE

## 2020-11-17 PROCEDURE — 99213 OFFICE O/P EST LOW 20 MIN: CPT | Performed by: INTERNAL MEDICINE

## 2020-11-17 PROCEDURE — 1123F ACP DISCUSS/DSCN MKR DOCD: CPT | Performed by: INTERNAL MEDICINE

## 2020-11-17 RX ORDER — OXYCODONE AND ACETAMINOPHEN 7.5; 325 MG/1; MG/1
1 TABLET ORAL EVERY 6 HOURS PRN
Qty: 112 TABLET | Refills: 0 | Status: SHIPPED | OUTPATIENT
Start: 2020-11-17 | End: 2020-12-15 | Stop reason: SDUPTHER

## 2020-11-17 RX ORDER — GABAPENTIN 600 MG/1
600 TABLET ORAL 2 TIMES DAILY
Qty: 60 TABLET | Refills: 1 | Status: SHIPPED | OUTPATIENT
Start: 2020-11-17 | End: 2021-02-09 | Stop reason: SDUPTHER

## 2020-11-17 RX ORDER — TRAZODONE HYDROCHLORIDE 50 MG/1
50-100 TABLET ORAL NIGHTLY
Qty: 60 TABLET | Refills: 1 | Status: SHIPPED | OUTPATIENT
Start: 2020-11-17 | End: 2021-01-14 | Stop reason: SDUPTHER

## 2020-11-17 RX ORDER — MELOXICAM 15 MG/1
TABLET ORAL
Qty: 30 TABLET | Refills: 1 | Status: SHIPPED | OUTPATIENT
Start: 2020-11-17 | End: 2021-03-09 | Stop reason: SDUPTHER

## 2020-11-17 RX ORDER — DULOXETIN HYDROCHLORIDE 60 MG/1
CAPSULE, DELAYED RELEASE ORAL
Qty: 30 CAPSULE | Refills: 1 | Status: SHIPPED | OUTPATIENT
Start: 2020-11-17 | End: 2021-01-14 | Stop reason: SDUPTHER

## 2020-11-17 RX ORDER — PANTOPRAZOLE SODIUM 40 MG/1
40 TABLET, DELAYED RELEASE ORAL DAILY
Qty: 30 TABLET | Refills: 1 | Status: SHIPPED | OUTPATIENT
Start: 2020-11-17 | End: 2021-01-14 | Stop reason: SDUPTHER

## 2020-11-17 NOTE — PROGRESS NOTES
[facility]  1945  <[id number]>      HISTORY OF PRESENT ILLNESS:  Ms. Doris Lee is a 70 y.o. female returns for a follow up visit for pain management  She has a diagnosis of   1. Chronic pain syndrome    2. Fibromyalgia    3. DDD (degenerative disc disease), lumbar    4. Degeneration of lumbar or lumbosacral intervertebral disc    5. Lumbar spondylosis    6. Trochanteric bursitis of right hip    7. Osteoarthritis of spine with radiculopathy, lumbar region    8. Primary insomnia    9. Gastroesophageal reflux disease without esophagitis    10. Drug-induced constipation    . She complains of pain in the upper and lower back, right leg, right ankle She rates the pain 8/10 and describes it as aching. Current treatment regimen has helped relieve about 40% of the pain. She denies any side effects from the current pain regimen. Patient reports that since the last follow up visit the physical functioning is worse, family/social relationships are unchanged, mood is worse sleep patterns are unchanged, and that the overall functioning is worse. Patient denies misusing/abusing her narcotic pain medications or using any illegal drugs. There are No indicators for possible drug abuse, addiction or diversion problems. Ms. Doris Lee states she has been having increase pain, she is hurting all over. Patient states she has been feeling down somewhat. Patient BP is running low. She complains that her back hurts worse than her legs but pain goes all the way across. She mentions she is using Percocet 4 per day. Patient denies any constipation symptoms. ALLERGIES: Patients list of allergies were reviewed     MEDICATIONS: Ms. Doris Lee list of medications were reviewed. Her current medications are   Outpatient Medications Prior to Visit   Medication Sig Dispense Refill    atorvastatin (LIPITOR) 80 MG tablet TAKE 1 TABLET BY MOUTH EVERY DAY FOR CHOLESTEROL 30 tablet 0    oxyCODONE-acetaminophen (PERCOCET) 7.5-325 MG per tablet Take 1 tablet by mouth every 6 hours as needed for Pain for up to 28 days. Max 4 a day 112 tablet 0    traZODone (DESYREL) 50 MG tablet Take 1-2 tablets by mouth nightly 60 tablet 1    gabapentin (NEURONTIN) 600 MG tablet Take 1 tablet by mouth 2 times daily for 30 days. 60 tablet 1    DULoxetine (CYMBALTA) 60 MG extended release capsule Take one tablet at night 30 capsule 1    pantoprazole (PROTONIX) 40 MG tablet Take 1 tablet by mouth daily 30 tablet 1    meloxicam (MOBIC) 15 MG tablet TAKE 1 TABLET BY MOUTH DAILY 30 tablet 1    Fluticasone furoate-vilanterol (BREO ELLIPTA) 200-25 MCG/INH AEPB inhaler Inhale 1 puff into the lungs daily 1 each 5    Blood Glucose Monitoring Suppl (ONE TOUCH ULTRA MINI) w/Device KIT 1 kit by Does not apply route daily 1 kit 0    ONE TOUCH LANCETS MISC 1 each by Does not apply route daily 100 each 3    blood glucose test strips (ASCENSIA AUTODISC VI;ONE TOUCH ULTRA TEST VI) strip 1 each by In Vitro route daily 100 each 3    albuterol sulfate  (90 Base) MCG/ACT inhaler Inhale 2 puffs into the lungs every 6 hours as needed for Shortness of Breath 1 Inhaler 2    ipratropium-albuterol (DUONEB) 0.5-2.5 (3) MG/3ML SOLN nebulizer solution Take 1 aerosol every 4 hours for 2 days and then as needed for shortness of breath coughing and wheezing 360 mL 3    Cholecalciferol (VITAMIN D) 2000 units TABS tablet Take 1 tablet by mouth daily 30 tablet     cetirizine (ZYRTEC) 10 MG tablet Take 10 mg by mouth daily as needed for Allergies      Nebulizers (COMPRESSOR/NEBULIZER) MISC 1 Units by Does not apply route 4 times daily 1 each 3    tiZANidine (ZANAFLEX) 4 MG tablet Take one tablet po BID 60 tablet 1     No facility-administered medications prior to visit. SOCIAL/FAMILY/PAST MEDICAL HISTORY: Ms. Nba Nelson, family and past medical history was reviewed.      REVIEW OF SYSTEMS:    Respiratory: Negative for apnea, chest tightness and shortness of breath or change in baseline breathing. Gastrointestinal: Negative for nausea, vomiting, abdominal pain, diarrhea, constipation, blood in stool and abdominal distention. PHYSICAL EXAM:   Nursing note and vitals reviewed. /64   Pulse 67   Temp 98 °F (36.7 °C) (Infrared)   Wt 129 lb (58.5 kg)   BMI 22.14 kg/m²   Constitutional: She appears well-developed and well-nourished. No acute distress. Skin: Skin is warm and dry, good turgor. No rash noted. She is not diaphoretic. Cardiovascular: Normal rate, regular rhythm, normal heart sounds, and does not have murmur. Pulmonary/Chest: Effort normal. No respiratory distress. She does not have wheezes in the lung fields. She has no rales. Neurological/Psychiatric:She is alert and oriented to person, place, and time. Coordination is  normal.  Her mood isAppropriate and affect is Flat/blunted and Anxious . Her    IMPRESSION:   1. Chronic pain syndrome    2. Fibromyalgia    3. DDD (degenerative disc disease), lumbar    4. Degeneration of lumbar or lumbosacral intervertebral disc    5. Lumbar spondylosis    6. Trochanteric bursitis of right hip    7. Osteoarthritis of spine with radiculopathy, lumbar region        PLAN:  Informed verbal consent was obtained  -continue with current opioid regimen Percocet 4 per day  -Urine drug screen with GC/MS for opiates and drugs of abuse was ordered and will follow up on results.   -Continue with all other adjuvant medications as before  -Adv Biofeedback, relaxation and meditation techniques.  Referral to psychologist for CBT was also discussed with patient   -will try increasing Neurontin to 1800 mg, if dizziness or somnolence occurs then will decrease it back to 2 per day  -discontinue Zanaflex     Current Outpatient Medications   Medication Sig Dispense Refill    atorvastatin (LIPITOR) 80 MG tablet TAKE 1 TABLET BY MOUTH EVERY DAY FOR CHOLESTEROL 30 tablet 0    oxyCODONE-acetaminophen (PERCOCET) 7.5-325 MG per tablet Take 1 Degeneration of lumbar or lumbosacral intervertebral disc, Lumbar spondylosis, Trochanteric bursitis of right hip, Osteoarthritis of spine with radiculopathy, lumbar region, Primary insomnia, Gastroesophageal reflux disease without esophagitis, and Drug-induced constipation were pertinent to this visit. Risks and benefits of the medications and other alternative treatments  including no treatment were discussed with the patient. The common side effects of these medications were also explained to the patient. Informed verbal consent was obtained. Goals of current treatment regimen include improvement in pain, restoration of functioning- with focus on improvement in physical performance, general activity, work or disability,emotional distress, health care utilization and  decreased medication consumption. Will continue to monitor progress towards achieving/maintaining therapeutic goals with special emphasis on  1. Improvement in perceived interfernce  of pain with ADL's. Ability to do home exercises independently. Ability to do household chores indoor and/or outdoor work and social and leisure activities. Improve psychosocial and physical functioning. - she is showing progression towards this treatment goal with the current regimen. She was advised against drinking alcohol with the narcotic pain medicines, advised against driving or handling machinery while adjusting the dose of medicines or if having cognitive  issues related to the current medications. Risk of overdose and death, if medicines not taken as prescribed, were also discussed. If the patient develops new symptoms or if the symptoms worsen, the patient should call the office. While transcribing every attempt was made to maintain the accuracy of the note in terms of it's contents,there may have been some errors made inadvertently. Thank you for allowing me to participate in the care of this patient.     Hiwot Villar MD.    Cc: Laine Pierre Mary Shoemaker - CNP

## 2020-12-02 ENCOUNTER — TELEPHONE (OUTPATIENT)
Dept: PULMONOLOGY | Age: 75
End: 2020-12-02

## 2020-12-02 RX ORDER — PREDNISONE 10 MG/1
TABLET ORAL
Qty: 30 TABLET | Refills: 0 | Status: SHIPPED | OUTPATIENT
Start: 2020-12-02 | End: 2020-12-12

## 2020-12-02 NOTE — TELEPHONE ENCOUNTER
Complains of cough and increasing SOB  Duration: 1 week  O2 sat is 88 resting on room air. Cough with sputum production: Yes  Color: Clear  Fever: No  Any other Symptoms: Fatigue  Any current treatment tried: No OTC  Using inhalers: Yes do they help: Yes  Pharmacy: Patient requesting a refill on albuterol and patient wanting pred.    Her pharmacy is: [facility] on [address]

## 2020-12-04 RX ORDER — ATORVASTATIN CALCIUM 80 MG/1
TABLET, FILM COATED ORAL
Qty: 30 TABLET | Refills: 2 | Status: SHIPPED | OUTPATIENT
Start: 2020-12-04 | End: 2021-03-04

## 2020-12-15 ENCOUNTER — VIRTUAL VISIT (OUTPATIENT)
Dept: PAIN MANAGEMENT | Age: 75
End: 2020-12-15
Payer: MEDICARE

## 2020-12-15 PROCEDURE — 1090F PRES/ABSN URINE INCON ASSESS: CPT | Performed by: INTERNAL MEDICINE

## 2020-12-15 PROCEDURE — G8427 DOCREV CUR MEDS BY ELIG CLIN: HCPCS | Performed by: INTERNAL MEDICINE

## 2020-12-15 PROCEDURE — 1123F ACP DISCUSS/DSCN MKR DOCD: CPT | Performed by: INTERNAL MEDICINE

## 2020-12-15 PROCEDURE — 4040F PNEUMOC VAC/ADMIN/RCVD: CPT | Performed by: INTERNAL MEDICINE

## 2020-12-15 PROCEDURE — 99213 OFFICE O/P EST LOW 20 MIN: CPT | Performed by: INTERNAL MEDICINE

## 2020-12-15 PROCEDURE — 3017F COLORECTAL CA SCREEN DOC REV: CPT | Performed by: INTERNAL MEDICINE

## 2020-12-15 PROCEDURE — G8399 PT W/DXA RESULTS DOCUMENT: HCPCS | Performed by: INTERNAL MEDICINE

## 2020-12-15 RX ORDER — OXYCODONE AND ACETAMINOPHEN 7.5; 325 MG/1; MG/1
1 TABLET ORAL EVERY 6 HOURS PRN
Qty: 112 TABLET | Refills: 0 | Status: SHIPPED | OUTPATIENT
Start: 2020-12-15 | End: 2021-01-14 | Stop reason: SDUPTHER

## 2020-12-15 NOTE — PROGRESS NOTES
TELE HEALTH VISIT (AUDIO-VISUAL)    Pursuant to the emergency declaration under the [facility], [facility] waiver authority and the [employer] Act, this Virtual  Visit was conducted, with patient's/legal guardian's consent, to reduce the patient's risk of exposure to COVID-19 and provide continuity of care for an established patient. Service is  provided through a video synchronous discussion virtually to substitute for in-person clinic visit. Due to this being a TeleHealth encounter (During TSULY-83 public health emergency), evaluation of the following organ systems was limited: Vitals/Constitutional/EENT/Resp/CV/GI//MS/Neuro/Skin/Eeno-Ahor-Qsj. Patrick Klein  1945  <[id number]>    Ms. Zack Hernández is being seen virtually for a follow up visit using one of the following techniques  Google Duo  Informed verbal consent to the virtual visit was obtained from Ms. Zack Hernández. Risks associated with HIPPA compliance with the virtual visit was explained to the patient. Ms. Zack Hernández is at her residence and Dr. Yamile Chaudhary is in his office. HISTORY OF PRESENT ILLNESS:  Ms. Zack Hernández is a 70 y.o. female  being assessed for a follow up visit for pain management for evaluation of ongoing care regarding her symptoms and monitoring of compliance with long term use high risk medications. She has a diagnosis of   1. Chronic pain syndrome    2. Fibromyalgia    3. DDD (degenerative disc disease), lumbar    4. Degeneration of lumbar or lumbosacral intervertebral disc    5. Lumbar spondylosis    6. Osteoarthritis of spine with radiculopathy, lumbar region    .  Fluticasone furoate-vilanterol (BREO ELLIPTA) 200-25 MCG/INH AEPB inhaler Inhale 1 puff into the lungs daily 1 each 5    Blood Glucose Monitoring Suppl (ONE TOUCH ULTRA MINI) w/Device KIT 1 kit by Does not apply route daily 1 kit 0    ONE TOUCH LANCETS MISC 1 each by Does not apply route daily 100 each 3    blood glucose test strips (ASCENSIA AUTODISC VI;ONE TOUCH ULTRA TEST VI) strip 1 each by In Vitro route daily 100 each 3    albuterol sulfate  (90 Base) MCG/ACT inhaler Inhale 2 puffs into the lungs every 6 hours as needed for Shortness of Breath 1 Inhaler 2    ipratropium-albuterol (DUONEB) 0.5-2.5 (3) MG/3ML SOLN nebulizer solution Take 1 aerosol every 4 hours for 2 days and then as needed for shortness of breath coughing and wheezing 360 mL 3    Cholecalciferol (VITAMIN D) 2000 units TABS tablet Take 1 tablet by mouth daily 30 tablet     cetirizine (ZYRTEC) 10 MG tablet Take 10 mg by mouth daily as needed for Allergies      Nebulizers (COMPRESSOR/NEBULIZER) MISC 1 Units by Does not apply route 4 times daily 1 each 3     No facility-administered medications prior to visit. SOCIAL/FAMILY/PAST MEDICAL HISTORY: Ms. Ruddy Wall, family and past medical history was reviewed. REVIEW OF SYSTEMS:    Respiratory: Negative for apnea, chest tightness and shortness of breath or change in baseline breathing. Gastrointestinal: Negative for nausea, vomiting, abdominal pain, diarrhea, constipation, blood in stool and abdominal distention. PHYSICAL EXAM:   Nursing note and vitals per patient reviewed. There were no vitals taken for this visit. Constitutional: She appears well-developed and well-nourished. No acute distress. No respiratory distress. Skin: Skin appears to be warm and dry. No rashes or any other marks noted. She is not diaphoretic. Respiratory/Pulmonary: NO conversational dyspnea, no accessory muscle use, no coughing during exam. She does not appear to be in labored breathing. Neurological/Psychiatric:She is alert and oriented to person, place, and time. Coordination is  normal.  Her mood isAppropriate and affect is Flat/blunted and Anxious. Musculoskeletal / Extremities: Range of motion is normal. Gait is normal, assistive devices use: none. IMPRESSION:   1. Chronic pain syndrome    2. Fibromyalgia    3. DDD (degenerative disc disease), lumbar    4. Degeneration of lumbar or lumbosacral intervertebral disc    5. Lumbar spondylosis    6. Osteoarthritis of spine with radiculopathy, lumbar region    7. Trochanteric bursitis of right hip        PLAN:  Informed verbal consent regarding treatment was obtained  -Continue current opioid regimen Percocet 4 per day   -Adv Biofeedback, relaxation and meditation techniques. Referral to psychologist for CBT was also discussed with patient   -She was advised to increase fluids ( 5-7  glasses of fluid daily), limit caffeine, avoid cheese products, increase dietary fiber, increase activity and exercise as tolerated and relax regularly and enjoy meals   -Walking as tolerated 30 minutes daily   -Patient's urine drug screen results with GC/MS confirmation were obtained and reviewed and were negative for any illicit drugs + for low dose of THC. Prescribed medications were within acceptable range. States using CBD oil   -Advised to discontinue CBD oil  Current Outpatient Medications   Medication Sig Dispense Refill    atorvastatin (LIPITOR) 80 MG tablet TAKE 1 TABLET BY MOUTH EVERY DAY FOR CHOLESTEROL 30 tablet 2    oxyCODONE-acetaminophen (PERCOCET) 7.5-325 MG per tablet Take 1 tablet by mouth every 6 hours as needed for Pain for up to 28 days.  Max 4 a day 112 tablet 0    traZODone (DESYREL) 50 MG tablet Take 1-2 tablets by mouth nightly 60 tablet 1 Risks and benefits of the medications and other alternative treatments  including no treatment were discussed with the patient. The common side effects of these medications were also explained to the patient. Informed verbal consent was obtained. Goals of current treatment regimen include improvement in pain, restoration of functioning- with focus on improvement in physical performance, general activity, work or disability,emotional distress, health care utilization and  decreased medication consumption. Will continue to monitor progress towards achieving/maintaining therapeutic goals with special emphasis on  1. Improvement in perceived interfernce  of pain with ADL's. Ability to do home exercises independently. Ability to do household chores indoor and/or outdoor work and social and leisure activities. Improve psychosocial and physical functioning. - she is showing progression towards this treatment goal with the current regimen. She was advised against drinking alcohol with the narcotic pain medicines, advised against driving or handling machinery while adjusting the dose of medicines or if having cognitive  issues related to the current medications. Risk of overdose and death, if medicines not taken as prescribed, were also discussed. If the patient develops new symptoms or if the symptoms worsen, the patient should call the office. While transcribing every attempt was made to maintain the accuracy of the note in terms of it's contents,there may have been some errors made inadvertently. Thank you for allowing me to participate in the care of this patient.     Meredith Roberson MD.    Cc: HECTOR Mack - CNP

## 2020-12-18 RX ORDER — TIZANIDINE 4 MG/1
TABLET ORAL
Qty: 60 TABLET | Refills: 1 | OUTPATIENT
Start: 2020-12-18

## 2021-01-11 ENCOUNTER — HOSPITAL ENCOUNTER (OUTPATIENT)
Dept: CT IMAGING | Age: 76
Discharge: HOME | End: 2021-01-11
Payer: MEDICARE

## 2021-01-11 DIAGNOSIS — Z87.891 PERSONAL HISTORY OF TOBACCO USE: ICD-10-CM

## 2021-01-11 PROCEDURE — 71271 CT THORAX LUNG CANCER SCR C-: CPT

## 2021-01-13 ENCOUNTER — TELEPHONE (OUTPATIENT)
Dept: PAIN MANAGEMENT | Age: 76
End: 2021-01-13

## 2021-01-14 ENCOUNTER — VIRTUAL VISIT (OUTPATIENT)
Dept: PAIN MANAGEMENT | Age: 76
End: 2021-01-14
Payer: MEDICARE

## 2021-01-14 DIAGNOSIS — M47.26 OSTEOARTHRITIS OF SPINE WITH RADICULOPATHY, LUMBAR REGION: ICD-10-CM

## 2021-01-14 DIAGNOSIS — R25.2 MUSCLE CRAMPING: ICD-10-CM

## 2021-01-14 DIAGNOSIS — M70.61 TROCHANTERIC BURSITIS OF RIGHT HIP: ICD-10-CM

## 2021-01-14 DIAGNOSIS — M51.37 DEGENERATION OF LUMBAR OR LUMBOSACRAL INTERVERTEBRAL DISC: ICD-10-CM

## 2021-01-14 DIAGNOSIS — M51.36 DDD (DEGENERATIVE DISC DISEASE), LUMBAR: ICD-10-CM

## 2021-01-14 DIAGNOSIS — G89.4 CHRONIC PAIN SYNDROME: ICD-10-CM

## 2021-01-14 DIAGNOSIS — M79.7 FIBROMYALGIA: ICD-10-CM

## 2021-01-14 DIAGNOSIS — T50.905D MEDICATION SIDE EFFECTS PRESENT, SUBSEQUENT ENCOUNTER: ICD-10-CM

## 2021-01-14 DIAGNOSIS — K21.9 GASTROESOPHAGEAL REFLUX DISEASE WITHOUT ESOPHAGITIS: ICD-10-CM

## 2021-01-14 DIAGNOSIS — M47.816 LUMBAR SPONDYLOSIS: ICD-10-CM

## 2021-01-14 DIAGNOSIS — F51.01 PRIMARY INSOMNIA: ICD-10-CM

## 2021-01-14 DIAGNOSIS — F33.1 MODERATE EPISODE OF RECURRENT MAJOR DEPRESSIVE DISORDER (HCC): ICD-10-CM

## 2021-01-14 PROCEDURE — G8427 DOCREV CUR MEDS BY ELIG CLIN: HCPCS | Performed by: INTERNAL MEDICINE

## 2021-01-14 PROCEDURE — G8399 PT W/DXA RESULTS DOCUMENT: HCPCS | Performed by: INTERNAL MEDICINE

## 2021-01-14 PROCEDURE — 1090F PRES/ABSN URINE INCON ASSESS: CPT | Performed by: INTERNAL MEDICINE

## 2021-01-14 PROCEDURE — 1123F ACP DISCUSS/DSCN MKR DOCD: CPT | Performed by: INTERNAL MEDICINE

## 2021-01-14 PROCEDURE — 4040F PNEUMOC VAC/ADMIN/RCVD: CPT | Performed by: INTERNAL MEDICINE

## 2021-01-14 PROCEDURE — 99214 OFFICE O/P EST MOD 30 MIN: CPT | Performed by: INTERNAL MEDICINE

## 2021-01-14 PROCEDURE — 3017F COLORECTAL CA SCREEN DOC REV: CPT | Performed by: INTERNAL MEDICINE

## 2021-01-14 RX ORDER — PANTOPRAZOLE SODIUM 40 MG/1
40 TABLET, DELAYED RELEASE ORAL DAILY
Qty: 30 TABLET | Refills: 1 | Status: SHIPPED | OUTPATIENT
Start: 2021-01-14 | End: 2021-02-09 | Stop reason: SDUPTHER

## 2021-01-14 RX ORDER — DULOXETIN HYDROCHLORIDE 60 MG/1
CAPSULE, DELAYED RELEASE ORAL
Qty: 30 CAPSULE | Refills: 1 | Status: SHIPPED | OUTPATIENT
Start: 2021-01-14 | End: 2021-02-09 | Stop reason: SDUPTHER

## 2021-01-14 RX ORDER — METHOCARBAMOL 500 MG/1
500 TABLET, FILM COATED ORAL 2 TIMES DAILY
Qty: 60 TABLET | Refills: 0 | Status: SHIPPED | OUTPATIENT
Start: 2021-01-14 | End: 2021-02-09 | Stop reason: SDUPTHER

## 2021-01-14 RX ORDER — TRAZODONE HYDROCHLORIDE 50 MG/1
50-100 TABLET ORAL NIGHTLY
Qty: 60 TABLET | Refills: 1 | Status: SHIPPED | OUTPATIENT
Start: 2021-01-14 | End: 2021-02-09 | Stop reason: SDUPTHER

## 2021-01-14 RX ORDER — OXYCODONE AND ACETAMINOPHEN 7.5; 325 MG/1; MG/1
1 TABLET ORAL EVERY 6 HOURS PRN
Qty: 112 TABLET | Refills: 0 | Status: SHIPPED | OUTPATIENT
Start: 2021-01-14 | End: 2021-02-09 | Stop reason: SDUPTHER

## 2021-01-14 NOTE — PROGRESS NOTES
TELE HEALTH VISIT (AUDIO-VISUAL)    Pursuant to the emergency declaration under the [facility] waiver authority and the [employer] Act, this Virtual  Visit was conducted, with patient's/legal guardian's consent, to reduce the patient's risk of exposure to COVID-19 and provide continuity of care for an established patient. Service is  provided through a video synchronous discussion virtually to substitute for in-person clinic visit. Due to this being a TeleHealth encounter (During VBBLD-81 public health emergency), evaluation of the following organ systems was limited: Vitals/Constitutional/EENT/Resp/CV/GI//MS/Neuro/Skin/Muul-Ibqn-Zsc. Sugar Acosta  1945  <[id number]>    Ms. Miriam Back is being seen virtually for a follow up visit using one of the following techniques  Google Duo, Face time or Doxy. me  Informed verbal consent to the virtual visit was obtained from Ms. Miriam Back. Risks associated with HIPPA compliance with the virtual visit was explained to the patient. Ms. Miriam Back is at her residence and Dr. Ruth Damon is in his office. HISTORY OF PRESENT ILLNESS:  Ms. Miriam Back is a 70 y.o. female returns for a follow up visit for multiple medical problems. Her current presenting problems are   1. Chronic pain syndrome    2. Osteoarthritis of spine with radiculopathy, lumbar region    3. Fibromyalgia    4. Degeneration of lumbar or lumbosacral intervertebral disc    5. DDD (degenerative disc disease), lumbar    6. Lumbar spondylosis    7. Drug-induced constipation    8. Moderate episode of recurrent major depressive disorder ([facility] [address]    9. Muscle cramping    10. Primary insomnia    11. Medication side effects present, initial encounter    12. Gastroesophageal reflux disease without esophagitis    . As per information/history obtained from the PADT(patient assessment and documentation tool) - She complains of pain in the upper back, mid back and lower back with radiation to the upper leg Right, lower leg Right and ankles Right She rates the pain 5/10 and describes it as aching, burning. Pain is made worse by: movement, walking, standing, sitting, bending. Current treatment regimen has helped relieve about 40% of the pain. She denies side effects from the current pain regimen. Patient reports that since the last follow up visit the physical functioning is unchanged, family/social relationships are unchanged, mood is unchanged and sleep patterns are unchanged, and that the overall functioning is unchanged. Patient denies neurological bowel or bladder. Patient denies misusing/abusing her narcotic pain medications or using any illegal drugs. There are No indicators for possible drug abuse, addiction or diversion problems. Upon obtaining the medical history from Ms. Kate Trevino regarding today's office visit for her presenting problems, patient states she has been doing fair, pain has been tolerable with the regimen. She says she is using Percocet 4 per day. She denies any constipation symptoms. She mentions she tried increasing her Neurontin, but had side effects. She complains she is getting leg cramps, which is very painful in the morning although she gets them at night also. Patient states her sleep is fair. Has fairly normal sleep latency. Averages about 4-6 hours of sleep a night. Denies any signs of sleep apnea. Feels somewhat rested in the morning. She states she is using Trazodone, which helps. Denies abdominal pain, dysphagia, gas bloat, heartburn, nausea, odynophagia, regurgitation, vomiting and water brash-GERD Sxs are controlled  with the medications. She reports she is on Protonix. She mentions she is using Protonix. She says she is managing her ADLs and house chores.  traZODone (DESYREL) 50 MG tablet Take 1-2 tablets by mouth nightly 60 tablet 1    DULoxetine (CYMBALTA) 60 MG extended release capsule Take one tablet at night 30 capsule 1    pantoprazole (PROTONIX) 40 MG tablet Take 1 tablet by mouth daily 30 tablet 1    meloxicam (MOBIC) 15 MG tablet TAKE 1 TABLET BY MOUTH DAILY 30 tablet 1    Fluticasone furoate-vilanterol (BREO ELLIPTA) 200-25 MCG/INH AEPB inhaler Inhale 1 puff into the lungs daily 1 each 5    Blood Glucose Monitoring Suppl (ONE TOUCH ULTRA MINI) w/Device KIT 1 kit by Does not apply route daily 1 kit 0    ONE TOUCH LANCETS MISC 1 each by Does not apply route daily 100 each 3    blood glucose test strips (ASCENSIA AUTODISC VI;ONE TOUCH ULTRA TEST VI) strip 1 each by In Vitro route daily 100 each 3    albuterol sulfate  (90 Base) MCG/ACT inhaler Inhale 2 puffs into the lungs every 6 hours as needed for Shortness of Breath 1 Inhaler 2    ipratropium-albuterol (DUONEB) 0.5-2.5 (3) MG/3ML SOLN nebulizer solution Take 1 aerosol every 4 hours for 2 days and then as needed for shortness of breath coughing and wheezing 360 mL 3    Cholecalciferol (VITAMIN D) 2000 units TABS tablet Take 1 tablet by mouth daily 30 tablet     cetirizine (ZYRTEC) 10 MG tablet Take 10 mg by mouth daily as needed for Allergies      Nebulizers (COMPRESSOR/NEBULIZER) MISC 1 Units by Does not apply route 4 times daily 1 each 3    gabapentin (NEURONTIN) 600 MG tablet Take 1 tablet by mouth 2 times daily for 30 days. 60 tablet 1     No facility-administered medications prior to visit. REVIEW OF SYSTEMS:   Constitutional: Negative for fatigue and unexpected weight change. Eyes: Negative for visual disturbance. Respiratory: Negative for shortness of breath. Cardiovascular: Negative for chest pain, palpitations  Gastrointestinal: Negative for blood in stool, abdominal distention, nausea, vomiting, abdominal pain, diarrhea,constipation. Skin: Negative for color change or any abnormal bruising. Neurological: Negative for speech difficulty, weakness and light-headedness, dizziness, tremors, sleepiness  Psychiatric/Behavioral: Negative for suicidal ideas, hallucinations, behavioral problems, self-injury, decreased concentration/cognition, agitation, confusion. PHYSICAL EXAM:   Nursing note and vitals reviewed. There were no vitals taken for this visit. General Appearance: Patient is well nourished, well developed, well groomed and in no acute distress. Skin: Skin is warm and dry, good turgor . No rash or lesions noted. She is not diaphoretic. Pulmonary/Chest: Effort normal. No respiratory distress or use of accessory muscles. Auscultation revealing decreased air exchange bilaterally. She does not have wheezes in the lung fields. She has no rales. Cardiovascular: Normal rate, regular rhythm, normal heart sounds, and does not have murmur. Exam reveals no gallop and no friction rub. Abdominal: Soft. Bowel sounds are normal.  On inspection of abdomen is flat no distension and no mass. No tenderness. She has no rebound and no guarding. Musculoskeletal / Extremities: Range of motion is normal. Gait is normal, assistive devices use: none. She exhibits edema: none, and no tenderness. Neurological/Psychiatric:She is alert and oriented to person, place, and time. Coordination is  normal.   Judgement and Insight is normal  Her mood is Appropriate and affect is Flat/blunted and Anxious . Her behavior is normal.   thought content normal.        IMPRESSION:     1. Muscle cramping - INADEQUATELY CONTROLLED: treatment plan adjusted as below    2. Chronic pain syndrome - INADEQUATELY CONTROLLED: treatment plan adjusted as below    3. Osteoarthritis of spine with radiculopathy, lumbar region - INADEQUATELY CONTROLLED: treatment plan adjusted as below    4.  Fibromyalgia - INADEQUATELY CONTROLLED: treatment plan adjusted as below 5. DDD (degenerative disc disease), lumbar - STABLE: Continue current treatment plan    6. Moderate episode of recurrent major depressive disorder (HCC) - STABLE: Continue current treatment plan   7. Primary insomnia - STABLE: Continue current treatment plan   8. Medication side effects present, subsequent encounter - STABLE: Continue current treatment plan   9. Gastroesophageal reflux disease without esophagitis - STABLE: Continue current treatment plan   10. Lumbar spondylosis - STABLE: Continue current treatment plan   11. Degeneration of lumbar or lumbosacral intervertebral disc - STABLE: Continue current treatment plan   12. Trochanteric bursitis of right hip - STABLE: Continue current treatment plan       PLAN:  Informed verbal consent was obtained. -Advised patient to quit smoking for  health related concerns and to improve the treatment outcomes. Education was given on quitting smoking and the use of different modalities including medications, hypnotherapy, counselling  and biofeedback.  These were discussed with patient.   -Discontinue Zanaflex, start Robaxin 500 mg BID   -Start  mg daily to help with spasms, decrease Neurontin to 600 mg due to side effects   -Advised caffeine reduction, dietary changes, elevate head end of bed, NPO after supper, if using alcohol advised reduction of alcohol intake, tobacco cessation if smoking, weight loss   -Continue with Protonix   -she was advised proper sleep hygiene-told to avoid:use of caffeine or other stimulants after noon, alcohol use near bedtime, long or frequent naps during the day, erratic sleep schedule, heavy meals near bedtime, vigorous exercise near bedtime and use of electronic devices near bedtime   -Continue with Trazodone  -CBT techniques- relaxation therapies such as biofeedback, mindfulness based stress reduction, imagery, cognitive restructuring, problem solving discussed with patient   -Continue with Cymbalta 60 -Walking/stretching exercises   Ms. Kate Trevino will be prescribed  the medications  listed below which are for treatment of her presenting  medical problems which for this visit include:   Diagnoses of Chronic pain syndrome, Osteoarthritis of spine with radiculopathy, lumbar region, Fibromyalgia, Degeneration of lumbar or lumbosacral intervertebral disc, DDD (degenerative disc disease), lumbar, Lumbar spondylosis, Drug-induced constipation, Moderate episode of recurrent major depressive disorder ([facility] [address] Muscle cramping, Primary insomnia, Medication side effects present, initial encounter, and Gastroesophageal reflux disease without esophagitis were pertinent to this visit.   Medications/orders associated with this visit:    Current Outpatient Medications   Medication Sig Dispense Refill    atorvastatin (LIPITOR) 80 MG tablet TAKE 1 TABLET BY MOUTH EVERY DAY FOR CHOLESTEROL 30 tablet 2    traZODone (DESYREL) 50 MG tablet Take 1-2 tablets by mouth nightly 60 tablet 1    DULoxetine (CYMBALTA) 60 MG extended release capsule Take one tablet at night 30 capsule 1    pantoprazole (PROTONIX) 40 MG tablet Take 1 tablet by mouth daily 30 tablet 1    meloxicam (MOBIC) 15 MG tablet TAKE 1 TABLET BY MOUTH DAILY 30 tablet 1    Fluticasone furoate-vilanterol (BREO ELLIPTA) 200-25 MCG/INH AEPB inhaler Inhale 1 puff into the lungs daily 1 each 5    Blood Glucose Monitoring Suppl (ONE TOUCH ULTRA MINI) w/Device KIT 1 kit by Does not apply route daily 1 kit 0    ONE TOUCH LANCETS MISC 1 each by Does not apply route daily 100 each 3    blood glucose test strips (ASCENSIA AUTODISC VI;ONE TOUCH ULTRA TEST VI) strip 1 each by In Vitro route daily 100 each 3    albuterol sulfate  (90 Base) MCG/ACT inhaler Inhale 2 puffs into the lungs every 6 hours as needed for Shortness of Breath 1 Inhaler 2  ipratropium-albuterol (DUONEB) 0.5-2.5 (3) MG/3ML SOLN nebulizer solution Take 1 aerosol every 4 hours for 2 days and then as needed for shortness of breath coughing and wheezing 360 mL 3    Cholecalciferol (VITAMIN D) 2000 units TABS tablet Take 1 tablet by mouth daily 30 tablet     cetirizine (ZYRTEC) 10 MG tablet Take 10 mg by mouth daily as needed for Allergies      Nebulizers (COMPRESSOR/NEBULIZER) MISC 1 Units by Does not apply route 4 times daily 1 each 3    gabapentin (NEURONTIN) 600 MG tablet Take 1 tablet by mouth 2 times daily for 30 days. 60 tablet 1     No current facility-administered medications for this visit. Goals of current treatment regimen include improvement in pain, restoration of functioning- with focus on improvement in physical performance, general activity, work or disability,emotional distress, health care utilization and  decreased medication consumption. Will continue to monitor progress towards achieving/maintaining therapeutic goals with special emphasis on  1. Improvement in perceived interfernce  of pain with ADL's. Ability to do home exercises independently. Ability to do household chores indoor and/or outdoor work and social and leisure activities. To increase flexibility/ROM, strength and endurance. Improve psychosocial and physical functioning.- she is not showing any significant progress/or showing regression  towards this goal and reassessment and adjustment of goals/treatment have been made. 2. Improving sleep to 6-7 hours a night. Improve mood/ anxiety and depression symptoms such as crying spells, low energy, problems with concentration, motivation. - she is showing progression towards this treatment goal with the current regimen. 3. Reduction of reliance on opioid analgesia/more appropriate opioid use. - she is showing progression towards this treatment goal with the current regimen.  e Risks and benefits of the medications and other alternative treatments have been/were  discussed with the patient. Any questions on the  common side effects of these medications were also answered. She was advised against drinking alcohol with the narcotic pain medicines, advised against driving or handling machinery when  starting or adjusting the dose of medicines, feeling groggy or drowsy, or if having any cognitive issues related to the current medications. Sheis fully aware of the risk of overdose and death, if medicines are misused and not taken as prescribed. If she develops new symptoms or if the symptoms worsen, she was told to call the office. .  Thank you for allowing me to participate in the care of this patient.     Cisco Holman MD.    Cc: Irene Gautam, HECTOR - CNP

## 2021-01-18 ENCOUNTER — OFFICE VISIT (OUTPATIENT)
Dept: FAMILY MEDICINE CLINIC | Age: 76
End: 2021-01-18
Payer: MEDICARE

## 2021-01-18 ENCOUNTER — OFFICE VISIT (OUTPATIENT)
Dept: PULMONOLOGY | Age: 76
End: 2021-01-18
Payer: MEDICARE

## 2021-01-18 VITALS
RESPIRATION RATE: 14 BRPM | WEIGHT: 129 LBS | SYSTOLIC BLOOD PRESSURE: 110 MMHG | DIASTOLIC BLOOD PRESSURE: 74 MMHG | BODY MASS INDEX: 22.02 KG/M2 | OXYGEN SATURATION: 92 % | HEART RATE: 80 BPM | TEMPERATURE: 97.8 F | HEIGHT: 64 IN

## 2021-01-18 VITALS
RESPIRATION RATE: 16 BRPM | HEART RATE: 71 BPM | SYSTOLIC BLOOD PRESSURE: 130 MMHG | DIASTOLIC BLOOD PRESSURE: 68 MMHG | HEIGHT: 64 IN | TEMPERATURE: 97.9 F | WEIGHT: 129 LBS | BODY MASS INDEX: 22.02 KG/M2 | OXYGEN SATURATION: 96 %

## 2021-01-18 DIAGNOSIS — Z72.0 TOBACCO ABUSE: ICD-10-CM

## 2021-01-18 DIAGNOSIS — E11.9 CONTROLLED TYPE 2 DIABETES MELLITUS WITHOUT COMPLICATION, WITHOUT LONG-TERM CURRENT USE OF INSULIN (HCC): Primary | ICD-10-CM

## 2021-01-18 DIAGNOSIS — E78.00 HYPERCHOLESTEREMIA: ICD-10-CM

## 2021-01-18 DIAGNOSIS — J43.9 PULMONARY EMPHYSEMA, UNSPECIFIED EMPHYSEMA TYPE (HCC): ICD-10-CM

## 2021-01-18 DIAGNOSIS — J44.9 COPD, SEVERE (HCC): ICD-10-CM

## 2021-01-18 DIAGNOSIS — J44.9 COPD, MODERATE (HCC): ICD-10-CM

## 2021-01-18 PROBLEM — J96.21 ACUTE AND CHRONIC RESPIRATORY FAILURE WITH HYPOXIA (HCC): Status: RESOLVED | Noted: 2017-01-10 | Resolved: 2021-01-18

## 2021-01-18 PROBLEM — N17.9 AKI (ACUTE KIDNEY INJURY) (HCC): Status: RESOLVED | Noted: 2017-01-10 | Resolved: 2021-01-18

## 2021-01-18 PROCEDURE — 4040F PNEUMOC VAC/ADMIN/RCVD: CPT | Performed by: FAMILY MEDICINE

## 2021-01-18 PROCEDURE — G8926 SPIRO NO PERF OR DOC: HCPCS | Performed by: FAMILY MEDICINE

## 2021-01-18 PROCEDURE — 1090F PRES/ABSN URINE INCON ASSESS: CPT | Performed by: INTERNAL MEDICINE

## 2021-01-18 PROCEDURE — 3023F SPIROM DOC REV: CPT | Performed by: FAMILY MEDICINE

## 2021-01-18 PROCEDURE — G8427 DOCREV CUR MEDS BY ELIG CLIN: HCPCS | Performed by: FAMILY MEDICINE

## 2021-01-18 PROCEDURE — G8482 FLU IMMUNIZE ORDER/ADMIN: HCPCS | Performed by: INTERNAL MEDICINE

## 2021-01-18 PROCEDURE — G8420 CALC BMI NORM PARAMETERS: HCPCS | Performed by: INTERNAL MEDICINE

## 2021-01-18 PROCEDURE — G8926 SPIRO NO PERF OR DOC: HCPCS | Performed by: INTERNAL MEDICINE

## 2021-01-18 PROCEDURE — G8399 PT W/DXA RESULTS DOCUMENT: HCPCS | Performed by: FAMILY MEDICINE

## 2021-01-18 PROCEDURE — 1123F ACP DISCUSS/DSCN MKR DOCD: CPT | Performed by: INTERNAL MEDICINE

## 2021-01-18 PROCEDURE — 3017F COLORECTAL CA SCREEN DOC REV: CPT | Performed by: INTERNAL MEDICINE

## 2021-01-18 PROCEDURE — G8399 PT W/DXA RESULTS DOCUMENT: HCPCS | Performed by: INTERNAL MEDICINE

## 2021-01-18 PROCEDURE — 4004F PT TOBACCO SCREEN RCVD TLK: CPT | Performed by: INTERNAL MEDICINE

## 2021-01-18 PROCEDURE — G8420 CALC BMI NORM PARAMETERS: HCPCS | Performed by: FAMILY MEDICINE

## 2021-01-18 PROCEDURE — 3023F SPIROM DOC REV: CPT | Performed by: INTERNAL MEDICINE

## 2021-01-18 PROCEDURE — 1123F ACP DISCUSS/DSCN MKR DOCD: CPT | Performed by: FAMILY MEDICINE

## 2021-01-18 PROCEDURE — 4004F PT TOBACCO SCREEN RCVD TLK: CPT | Performed by: FAMILY MEDICINE

## 2021-01-18 PROCEDURE — G8427 DOCREV CUR MEDS BY ELIG CLIN: HCPCS | Performed by: INTERNAL MEDICINE

## 2021-01-18 PROCEDURE — G8482 FLU IMMUNIZE ORDER/ADMIN: HCPCS | Performed by: FAMILY MEDICINE

## 2021-01-18 PROCEDURE — 1090F PRES/ABSN URINE INCON ASSESS: CPT | Performed by: FAMILY MEDICINE

## 2021-01-18 PROCEDURE — 3017F COLORECTAL CA SCREEN DOC REV: CPT | Performed by: FAMILY MEDICINE

## 2021-01-18 PROCEDURE — 99214 OFFICE O/P EST MOD 30 MIN: CPT | Performed by: INTERNAL MEDICINE

## 2021-01-18 PROCEDURE — 2022F DILAT RTA XM EVC RTNOPTHY: CPT | Performed by: FAMILY MEDICINE

## 2021-01-18 PROCEDURE — 4040F PNEUMOC VAC/ADMIN/RCVD: CPT | Performed by: INTERNAL MEDICINE

## 2021-01-18 PROCEDURE — 99213 OFFICE O/P EST LOW 20 MIN: CPT | Performed by: FAMILY MEDICINE

## 2021-01-18 PROCEDURE — 3046F HEMOGLOBIN A1C LEVEL >9.0%: CPT | Performed by: FAMILY MEDICINE

## 2021-01-18 RX ORDER — ALBUTEROL SULFATE 90 UG/1
2 AEROSOL, METERED RESPIRATORY (INHALATION) EVERY 6 HOURS PRN
Qty: 1 INHALER | Refills: 11 | Status: SHIPPED | OUTPATIENT
Start: 2021-01-18 | End: 2021-12-29 | Stop reason: SDUPTHER

## 2021-01-18 RX ORDER — PREDNISONE 20 MG/1
40 TABLET ORAL DAILY
Qty: 10 TABLET | Refills: 0 | Status: SHIPPED | OUTPATIENT
Start: 2021-01-18 | End: 2021-01-23

## 2021-01-18 ASSESSMENT — COPD QUESTIONNAIRES
QUESTION2_CHESTPHLEGM: 3
QUESTION5_HOMEACTIVITIES: 1
QUESTION7_SLEEPQUALITY: 0
QUESTION6_LEAVINGHOUSE: 2

## 2021-01-18 NOTE — PROGRESS NOTES
DIABETES MELLITUS FOLOW-UP  Subjective:      Chief Complaint   Patient presents with    Diabetes     Richard Galindo is an 70 y.o. female who presents for follow up of following chronic problems:  1. Controlled type 2 diabetes mellitus without complication, without long-term current use of insulin ([address]    2. Hypercholesteremia    3. Tobacco abuse-advised to quit--lung cancer screening neg 5/18--repeat low dose ct 1 yr    4. Pulmonary emphysema, unspecified emphysema type ([address]      Complaints: pt is curious about prevagen, she hears about it on tv she says her mind has not been good lately she has been very forgetful . I told her safe but studies do not show any significant improvement and FDA has shut it down for false claims in past.    No appetite. Lost 21 lbs in past year. Denies early satiety. Feels down some this past year. Feels coping OK with family issues. · Patient checks sugars 0  time(s) daily. · Any low sugars? No  · Patent follows diabetic diet? No  · Exercise: walking rarely  · Taking medicines daily as directed? Yes  · Is the patient reporting any side effects of medications? No  · Patient checks bottom of feet daily? Yes  · Tobacco history updated:  reports that she has been smoking cigarettes. She started smoking about 59 years ago. She has a 82.50 pack-year smoking history. She has never used smokeless tobacco.    Review of Systems   General ROS: fever? No,   Night sweats? No  Ophthalmic ROS: change in vision? No  Endocrine ROS: fatigue? Yes, x 2 yrs  Unexpected weight changes? No  Respiratory ROS: Shortness of breath? No  Cardiovascular ROS: chest pain? No  Gastrointestinal ROS: abdominal pain? No  Change in stools? No  Genito-Urinary ROS: painful urination? No  Trouble urinating? No  Neurological ROS: TIA or stroke symptoms? No  Numbness/tingling? No  Dermatological ROS: rash or sores on feet? No  Changes in skin spots?     No    Health Maintenance Due   Topic Date Due    DTaP/Tdap/Td vaccine (2 - Td) 07/17/2017    Annual Wellness Visit (AWV)  05/29/2019    Shingles Vaccine (3 of 3) 01/19/2020    Lipid screen  07/05/2020     *Chief complaint, HPI, History and ROS provided by the medical assistant has been reviewed and verified by provider- Susana Soto MD    LAST LABS  LDL Calculated   Date Value Ref Range Status   07/05/2019 83 <100 mg/dL Final     Lab Results   Component Value Date    HDL 56 07/05/2019     Lab Results   Component Value Date    TRIG 109 07/05/2019     Lab Results   Component Value Date    GLUCOSE 105 (H) 05/11/2020     Lab Results   Component Value Date     05/11/2020    K 4.7 05/11/2020    CREATININE 1.1 05/11/2020     Lab Results   Component Value Date    WBC 7.4 03/06/2020    HGB 13.6 03/06/2020     03/06/2020     Lab Results   Component Value Date    ALT 10 05/11/2020    AST 12 (L) 05/11/2020    ALKPHOS 88 05/11/2020    BILITOT 0.3 05/11/2020     TSH (uIU/mL)   Date Value   01/21/2016 3.47     Lab Results   Component Value Date    LABA1C 6.1 05/11/2020    LABA1C 6.6 01/31/2020    LABA1C 6.1 07/05/2019     HISTORY:  Patient's medications, allergies, past medical, and social histories were reviewed and updated as appropriate.      CHART REVIEW  Health Maintenance Due   Topic Date Due    DTaP/Tdap/Td vaccine (2 - Td) 07/17/2017    Annual Wellness Visit (AWV)  05/29/2019    Shingles Vaccine (3 of 3) 01/19/2020    Lipid screen  07/05/2020     Social History     Tobacco Use    Smoking status: Current Every Day Smoker     Packs/day: 1.50     Years: 55.00     Pack years: 82.50     Types: Cigarettes     Start date: 1/1/1962    Smokeless tobacco: Never Used   Substance Use Topics    Alcohol use: No     Alcohol/week: 0.0 standard drinks    Drug use: No      The 10-year ASCVD risk score (Ok Xiong et al., 2013) is: 33.7%    Values used to calculate the score:      Age: 70 years      Sex: Female      Is Non-  American: No      Diabetic: Yes      Tobacco smoker: Yes      Systolic Blood Pressure: 604 mmHg      Is BP treated: No      HDL Cholesterol: 56 mg/dL      Total Cholesterol: 161 mg/dL  Prior to Visit Medications    Medication Sig Taking? Authorizing Provider   albuterol sulfate  (90 Base) MCG/ACT inhaler Inhale 2 puffs into the lungs every 6 hours as needed for Shortness of Breath Yes Janes Dukes MD   predniSONE (DELTASONE) 20 MG tablet Take 2 tablets by mouth daily for 5 days Yes Janes Dukes MD   traZODone (DESYREL) 50 MG tablet Take 1-2 tablets by mouth nightly Yes Yasmine Bhatt MD   DULoxetine (CYMBALTA) 60 MG extended release capsule Take one tablet at night Yes Yasmine Bhatt MD   pantoprazole (PROTONIX) 40 MG tablet Take 1 tablet by mouth daily Yes Yasmine Bhatt MD   oxyCODONE-acetaminophen (PERCOCET) 7.5-325 MG per tablet Take 1 tablet by mouth every 6 hours as needed for Pain for up to 28 days. Max 4 a day Yes Yasmine Bhatt MD   methocarbamol (ROBAXIN) 500 MG tablet Take 1 tablet by mouth 2 times daily Yes Yasmine Bhatt MD   atorvastatin (LIPITOR) 80 MG tablet TAKE 1 TABLET BY MOUTH EVERY DAY FOR CHOLESTEROL Yes HECTOR Escobar CNP   gabapentin (NEURONTIN) 600 MG tablet Take 1 tablet by mouth 2 times daily for 30 days.  Yes Yasmine Bhatt MD   meloxicam (MOBIC) 15 MG tablet TAKE 1 TABLET BY MOUTH DAILY Yes Yasmine Bhatt MD   Fluticasone furoate-vilanterol (BREO ELLIPTA) 200-25 MCG/INH AEPB inhaler Inhale 1 puff into the lungs daily Yes Janes Dukes MD   Blood Glucose Monitoring Suppl (ONE TOUCH ULTRA MINI) w/Device KIT 1 kit by Does not apply route daily Yes HECTOR Escobar CNP   ONE TOUCH LANCETS MISC 1 each by Does not apply route daily Yes HECTOR Escobar CNP   blood glucose test strips (ASCENSIA AUTODISC VI;ONE TOUCH ULTRA TEST VI) strip 1 each by In Vitro route daily Yes HECTOR Escobar CNP   ipratropium-albuterol (Walter Mcclellan) 0.5-2.5 (3) MG/3ML SOLN nebulizer solution Take 1 aerosol every 4 hours for 2 days and then as needed for shortness of breath coughing and wheezing Yes Nuris Rosa MD   Cholecalciferol (VITAMIN D) 2000 units TABS tablet Take 1 tablet by mouth daily Yes Valentina Shells, APRN - CNP   cetirizine (ZYRTEC) 10 MG tablet Take 10 mg by mouth daily as needed for Allergies Yes Historical Provider, MD   Nebulizers (COMPRESSOR/NEBULIZER) MISC 1 Units by Does not apply route 4 times daily Yes Danny Quinteros MD      Family History   Problem Relation Age of Onset    Cancer Father         COLON     Alzheimer's Disease Mother     Heart Failure Brother     Diabetes Daughter      Objective:   PHYSICAL EXAM   /68 (Site: Left Upper Arm, Position: Sitting, Cuff Size: Medium Adult)   Pulse 71   Temp 97.9 °F (36.6 °C) (Temporal)   Resp 16   Ht 5' 4\" (1.626 m)   Wt 129 lb (58.5 kg)   SpO2 96%   BMI 22.14 kg/m²   BP Readings from Last 5 Encounters:   01/18/21 130/68   01/18/21 110/74   11/17/20 103/64   08/20/20 96/64   07/28/20 122/61     Wt Readings from Last 20 Encounters:   01/18/21 129 lb (58.5 kg)   01/18/21 129 lb (58.5 kg)   11/17/20 129 lb (58.5 kg)   08/20/20 136 lb (61.7 kg)   07/28/20 140 lb (63.5 kg)   07/14/20 141 lb (64 kg)   06/23/20 150 lb (68 kg)   05/08/20 150 lb (68 kg)   05/06/20 145 lb (65.8 kg)   03/10/20 150 lb (68 kg)   03/06/20 151 lb (68.5 kg)   02/21/20 149 lb 4.8 oz (67.7 kg)   02/11/20 151 lb (68.5 kg)   02/07/20 151 lb (68.5 kg)   02/03/20 149 lb 7.6 oz (67.8 kg)   01/31/20 153 lb (69.4 kg)   01/14/20 150 lb (68 kg)   01/10/20 150 lb (68 kg)   01/10/20 150 lb (68 kg)   01/05/20 146 lb 13.2 oz (66.6 kg)     GENERAL:   · well-developed, well-nourished, alert, no distress.      EYES:   · External findings: lids and lashes normal and conjunctivae and sclerae normal  LUNGS:    · Breathing unlabored  · clear to auscultation bilaterally and good air movement  CARDIOVASC:   · regular rate and rhythm  · LEGS:  Lower extremity edema: none    SKIN: warm and dry  PSYCH:    · Alert and oriented  · Normal reasoning, insight good  · Facial expressions full, mood appropriate  · No memory disturbance noted  MUSCULOSKEL:    · No significant finger or nail findings  NEURO:   · CN 2-12 intact     Assessment and Plan:      Diagnosis Orders   1. Controlled type 2 diabetes mellitus without complication, without long-term current use of insulin (HCC)  HEMOGLOBIN A1C    Comprehensive Metabolic Panel   2. Hypercholesteremia  LIPID PANEL    Comprehensive Metabolic Panel   3. Tobacco abuse-advised to quit--lung cancer screening neg 5/18--repeat low dose ct 1 yr     4. Pulmonary emphysema, unspecified emphysema type (HCC)  CBC Auto Differential   Stable. Continue current Tx plan. Any changes marked below. Patient advised to quit smoking and assistance in doing so was offered. INSTRUCTIONS  NEXT APPOINTMENT: Please schedule annual complete physical (30 minutes) in 3 months with Keyon Rojas (Nurse Practitioner) . · PLEASE TAKE THIS FORM TO CHECK-OUT WINDOW TO SCHEDULE NEXT VISIT. · PLEASE GET FASTING BLOODWORK DRAWN SOON. Lab is on first floor in [address]. Hours Monday to Friday 7 AM to 5 PM.   · Keep eating. Please maintain this weight.

## 2021-01-18 NOTE — PATIENT INSTRUCTIONS
INSTRUCTIONS  NEXT APPOINTMENT: Please schedule annual complete physical (30 minutes) in 3 months with Bk Khan (Nurse Practitioner) . · PLEASE TAKE THIS FORM TO CHECK-OUT WINDOW TO SCHEDULE NEXT VISIT. · PLEASE GET FASTING BLOODWORK DRAWN SOON. Lab is on first floor in [address]. Hours Monday to Friday 7 AM to 5 PM.   · Keep eating. Please maintain this weight.   Patient Education

## 2021-01-18 NOTE — PROGRESS NOTES
REASON FOR CONSULTATION/CC: COPD          PCP: HECTOR Calvo CNP    HISTORY OF PRESENT ILLNESS: Starla Masters is a 70y.o. year old female with a history of COPD who presents :              COPD  Breo daily using albuterol HFA and nebulizer. Doing well. Needs refill of albuterol     COPD Assessment Test (CAT)  Cough Assessment: 2  Phlegm Assessment: 3  Chest tightness: 0  Walking on an incline: 3  Home Activities: 1  Confident Leaving The Home: 2  Sleeping Soundly: 0  Have Energy: 3  Assessment Score: 14         Tobacco abuse. Still smoking but not really ready to quit. Frustrated by prior failures. lung cancer screening completed with follow up needed in 1 year. PAST MEDICAL HISTORY:  Past Medical History:   Diagnosis Date    Chronic pain syndrome     Colorectal polyps     COPD (chronic obstructive pulmonary disease) (HCC)     CTS (carpal tunnel syndrome)     BILATERAL    DDD (degenerative disc disease), lumbar     Depression     Family hx of colon cancer     Fibromyalgia     Hyperlipemia     IBS (irritable bowel syndrome)     Lumbar spondylosis     New onset type 2 diabetes mellitus ([facility] [address] 2/3/2020    Urticaria, chronic        PAST SURGICAL HISTORY:  Past Surgical History:   Procedure Laterality Date    CARPAL TUNNEL RELEASE Bilateral     CERVICAL POLYP REMOVAL  9/2004    INTRACAPSULAR CATARACT EXTRACTION Left 6/23/2020    Phacoemulsification with intraocular lens implant performed by Omar Mata MD at [place] [address] Right 7/14/2020    Phacoemulsification with intraocular lens implant performed by Omar Mata MD at [place] [address]      patient denies        FAMILY HISTORY:  family history includes Alzheimer's Disease in her mother; Cancer in her father; Diabetes in her daughter; Heart Failure in her brother.     SOCIAL HISTORY:   reports that she has been smoking cigarettes. She started smoking about 59 years ago. She has a 82.50 pack-year smoking history. She has never used smokeless tobacco.      ALLERGIES:  Patient has No Known Allergies. Objective:   PHYSICAL EXAM:  Blood pressure 110/74, pulse 80, temperature 97.8 °F (36.6 °C), temperature source Infrared, resp. rate 14, height 5' 4\" (1.626 m), weight 129 lb (58.5 kg), SpO2 92 %, not currently breastfeeding.'    Gen: No distress. Eyes:    ENT:    Neck:    Resp: No accessory muscle use. No crackles. No wheezes. No rhonchi. CV: Regular rate. Regular rhythm. No murmur or rub. No edema. GI:    Skin:    Lymph:    M/S: No cyanosis. No clubbing. Neuro: Moves all four extremities. Psych: Oriented x 3. No anxiety. Awake. Alert. Intact judgement and insight. Current Outpatient Medications   Medication Sig Dispense Refill    albuterol sulfate  (90 Base) MCG/ACT inhaler Inhale 2 puffs into the lungs every 6 hours as needed for Shortness of Breath 1 Inhaler 11    predniSONE (DELTASONE) 20 MG tablet Take 2 tablets by mouth daily for 5 days 10 tablet 0    traZODone (DESYREL) 50 MG tablet Take 1-2 tablets by mouth nightly 60 tablet 1    DULoxetine (CYMBALTA) 60 MG extended release capsule Take one tablet at night 30 capsule 1    pantoprazole (PROTONIX) 40 MG tablet Take 1 tablet by mouth daily 30 tablet 1    oxyCODONE-acetaminophen (PERCOCET) 7.5-325 MG per tablet Take 1 tablet by mouth every 6 hours as needed for Pain for up to 28 days. Max 4 a day 112 tablet 0    methocarbamol (ROBAXIN) 500 MG tablet Take 1 tablet by mouth 2 times daily 60 tablet 0    atorvastatin (LIPITOR) 80 MG tablet TAKE 1 TABLET BY MOUTH EVERY DAY FOR CHOLESTEROL 30 tablet 2    gabapentin (NEURONTIN) 600 MG tablet Take 1 tablet by mouth 2 times daily for 30 days.  60 tablet 1    meloxicam (MOBIC) 15 MG tablet TAKE 1 TABLET BY MOUTH DAILY 30 tablet 1    Fluticasone furoate-vilanterol (BREO ELLIPTA) 200-25 MCG/INH AEPB inhaler Inhale 1 puff into the lungs daily 1 each 5    Blood Glucose Monitoring Suppl (ONE TOUCH ULTRA MINI) w/Device KIT 1 kit by Does not apply route daily 1 kit 0    ONE TOUCH LANCETS MISC 1 each by Does not apply route daily 100 each 3    blood glucose test strips (ASCENSIA AUTODISC VI;ONE TOUCH ULTRA TEST VI) strip 1 each by In Vitro route daily 100 each 3    ipratropium-albuterol (DUONEB) 0.5-2.5 (3) MG/3ML SOLN nebulizer solution Take 1 aerosol every 4 hours for 2 days and then as needed for shortness of breath coughing and wheezing 360 mL 3    Cholecalciferol (VITAMIN D) 2000 units TABS tablet Take 1 tablet by mouth daily 30 tablet     cetirizine (ZYRTEC) 10 MG tablet Take 10 mg by mouth daily as needed for Allergies      Nebulizers (COMPRESSOR/NEBULIZER) MISC 1 Units by Does not apply route 4 times daily 1 each 3     No current facility-administered medications for this visit. Data Reviewed:     Category 1 Data points:           Notes Reviewed: Dr. Mert Ely. Changed to Robaxin. Neurontin. Increase to 600.    1/11/ 21  Emphysema with stable tiny pulmonary nodules       LUNG RADS:   Per ACR Lung-RADS Version 1.1       Category 2, Benign appearance or behavior.       Management:  Continue annual lung screening with LDCT in 12 months. Total labs reviewed 0    Category 2 Data points:    Radiology Review:  Independent interpretation of CT chest.  Emphysema without signs of lung cancer. Category 3 Data points:    Discussed management or interpretation of test with external provider: 0        Assessment:     COPD, Moderately severe FEV1 59%  Tobacco abuse. Plan:        Problem List Items Addressed This Visit     Tobacco abuse-advised to quit--lung cancer screening neg 5/18--repeat low dose ct 1 yr     Not ready to quit at this time. We discussed need for lung cancer screening and quitting. The patient expressed understanding for all.           COPD, moderate ([address]

## 2021-01-18 NOTE — LETTER
[facility]   [address] [facility]. [address]   Phone: [phone]  Fax: [phone]     1/19/2021    Dr. Jayesh Vargas, APRN - CNP    I have seen your patient, Amparo Sanchez on follow up. Here is the assessment and plan for your patient. Any question, please do not hesitate to call. Problem List Items Addressed This Visit     Tobacco abuse-advised to quit--lung cancer screening neg 5/18--repeat low dose ct 1 yr     Not ready to quit at this time. We discussed need for lung cancer screening and quitting. The patient expressed understanding for all.           COPD, moderate ([address]     Controlled with Breo with albuterol           Relevant Medications    albuterol sulfate  (90 Base) MCG/ACT inhaler    predniSONE (DELTASONE) 20 MG tablet            Sincerely,    [facility]  [phone]

## 2021-01-19 NOTE — ASSESSMENT & PLAN NOTE
Not ready to quit at this time. We discussed need for lung cancer screening and quitting. The patient expressed understanding for all.

## 2021-02-09 ENCOUNTER — VIRTUAL VISIT (OUTPATIENT)
Dept: PAIN MANAGEMENT | Age: 76
End: 2021-02-09
Payer: MEDICARE

## 2021-02-09 DIAGNOSIS — G89.4 CHRONIC PAIN SYNDROME: ICD-10-CM

## 2021-02-09 DIAGNOSIS — M51.37 DEGENERATION OF LUMBAR OR LUMBOSACRAL INTERVERTEBRAL DISC: ICD-10-CM

## 2021-02-09 DIAGNOSIS — M51.36 DDD (DEGENERATIVE DISC DISEASE), LUMBAR: ICD-10-CM

## 2021-02-09 DIAGNOSIS — M47.816 LUMBAR SPONDYLOSIS: ICD-10-CM

## 2021-02-09 DIAGNOSIS — M79.7 FIBROMYALGIA: ICD-10-CM

## 2021-02-09 DIAGNOSIS — M70.61 TROCHANTERIC BURSITIS OF RIGHT HIP: ICD-10-CM

## 2021-02-09 DIAGNOSIS — M47.26 OSTEOARTHRITIS OF SPINE WITH RADICULOPATHY, LUMBAR REGION: ICD-10-CM

## 2021-02-09 PROCEDURE — 3017F COLORECTAL CA SCREEN DOC REV: CPT | Performed by: INTERNAL MEDICINE

## 2021-02-09 PROCEDURE — G8399 PT W/DXA RESULTS DOCUMENT: HCPCS | Performed by: INTERNAL MEDICINE

## 2021-02-09 PROCEDURE — 99213 OFFICE O/P EST LOW 20 MIN: CPT | Performed by: INTERNAL MEDICINE

## 2021-02-09 PROCEDURE — G8428 CUR MEDS NOT DOCUMENT: HCPCS | Performed by: INTERNAL MEDICINE

## 2021-02-09 PROCEDURE — 1123F ACP DISCUSS/DSCN MKR DOCD: CPT | Performed by: INTERNAL MEDICINE

## 2021-02-09 PROCEDURE — 1090F PRES/ABSN URINE INCON ASSESS: CPT | Performed by: INTERNAL MEDICINE

## 2021-02-09 PROCEDURE — 4040F PNEUMOC VAC/ADMIN/RCVD: CPT | Performed by: INTERNAL MEDICINE

## 2021-02-09 RX ORDER — PANTOPRAZOLE SODIUM 40 MG/1
40 TABLET, DELAYED RELEASE ORAL DAILY
Qty: 30 TABLET | Refills: 1 | Status: SHIPPED | OUTPATIENT
Start: 2021-02-09 | End: 2021-03-09 | Stop reason: SDUPTHER

## 2021-02-09 RX ORDER — DULOXETIN HYDROCHLORIDE 60 MG/1
CAPSULE, DELAYED RELEASE ORAL
Qty: 30 CAPSULE | Refills: 1 | Status: SHIPPED | OUTPATIENT
Start: 2021-02-09 | End: 2021-04-06 | Stop reason: SDUPTHER

## 2021-02-09 RX ORDER — GABAPENTIN 600 MG/1
600 TABLET ORAL 2 TIMES DAILY
Qty: 60 TABLET | Refills: 1 | Status: SHIPPED | OUTPATIENT
Start: 2021-02-09 | End: 2021-04-06 | Stop reason: SDUPTHER

## 2021-02-09 RX ORDER — OXYCODONE AND ACETAMINOPHEN 7.5; 325 MG/1; MG/1
1 TABLET ORAL EVERY 6 HOURS PRN
Qty: 112 TABLET | Refills: 0 | Status: SHIPPED | OUTPATIENT
Start: 2021-02-09 | End: 2021-03-09 | Stop reason: SDUPTHER

## 2021-02-09 RX ORDER — METHOCARBAMOL 500 MG/1
500 TABLET, FILM COATED ORAL 2 TIMES DAILY
Qty: 60 TABLET | Refills: 1 | Status: SHIPPED | OUTPATIENT
Start: 2021-02-09 | End: 2021-04-06

## 2021-02-09 RX ORDER — TRAZODONE HYDROCHLORIDE 50 MG/1
50-100 TABLET ORAL NIGHTLY
Qty: 60 TABLET | Refills: 1 | Status: SHIPPED | OUTPATIENT
Start: 2021-02-09 | End: 2021-04-06 | Stop reason: SDUPTHER

## 2021-02-09 NOTE — PROGRESS NOTES
TELE HEALTH VISIT (AUDIO-VISUAL)    Pursuant to the emergency declaration under the [facility] waiver authority and the [employer] Act, this Virtual  Visit was conducted, with patient's/legal guardian's consent, to reduce the patient's risk of exposure to COVID-19 and provide continuity of care for an established patient. Service is  provided through a video synchronous discussion virtually to substitute for in-person clinic visit. Due to this being a TeleHealth encounter (During WUJOQ-12 public health emergency), evaluation of the following organ systems was limited: Vitals/Constitutional/EENT/Resp/CV/GI//MS/Neuro/Skin/Xuqt-Kvfe-Ksv. Shahab Janeen  1945  [id number]    Ms. Darlin Sandoval is being seen virtually for a follow up visit using one of the following techniques  [employer] Du[employer]   Informed verbal consent to the virtual visit was obtained from Ms. Darlin Sandoval. Risks associated with HIPPA compliance with the virtual visit was explained to the patient. Ms. Darlin Sandoval is at her residence and Dr. Cally Graff is in his office. HISTORY OF PRESENT ILLNESS:  Ms. Darlin Sandoval is a 70 y.o. female returns for a follow up visit for multiple medical problems. Her current presenting problems are   1. Muscle cramping    2. Chronic pain syndrome    3. Osteoarthritis of spine with radiculopathy, lumbar region    4. DDD (degenerative disc disease), lumbar    5. Fibromyalgia    6. Lumbar spondylosis    . As per information/history obtained from the PADT(patient assessment and documentation tool) - She complains of pain in the lower back with radiation to the upper back, mid back and buttocks She rates the pain 4/10 and describes it as aching. Pain is made worse by: standing. Current treatment regimen has helped relieve about 40% of the pain. She denies side effects from the current pain regimen. Patient reports that since the last follow up visit the physical functioning is unchanged, family/social relationships are unchanged, mood is unchanged and sleep patterns are unchanged, and that the overall functioning is unchanged. Patient denies neurological bowel or bladder. Patient denies misusing/abusing her narcotic pain medications or using any illegal drugs. There are No indicators for possible drug abuse, addiction or diversion problems. Upon obtaining the medical history from Ms. William Taylor regarding today's office visit for her presenting problems, patient states she has been doing good, pain as been manageable. She says she has been complaint with all the adjuvants. She mentions the Robaxin MGO, which is helping with cramping. She reports she has been managing her ADLs and house chores. She says her breathing has been okay. ALLERGIES: Patients list of allergies were reviewed     MEDICATIONS: Ms. William Taylor list of medications were reviewed. Her current medications are   Outpatient Medications Prior to Visit   Medication Sig Dispense Refill    albuterol sulfate  (90 Base) MCG/ACT inhaler Inhale 2 puffs into the lungs every 6 hours as needed for Shortness of Breath 1 Inhaler 11    traZODone (DESYREL) 50 MG tablet Take 1-2 tablets by mouth nightly 60 tablet 1    DULoxetine (CYMBALTA) 60 MG extended release capsule Take one tablet at night 30 capsule 1    pantoprazole (PROTONIX) 40 MG tablet Take 1 tablet by mouth daily 30 tablet 1  oxyCODONE-acetaminophen (PERCOCET) 7.5-325 MG per tablet Take 1 tablet by mouth every 6 hours as needed for Pain for up to 28 days. Max 4 a day 112 tablet 0    methocarbamol (ROBAXIN) 500 MG tablet Take 1 tablet by mouth 2 times daily 60 tablet 0    atorvastatin (LIPITOR) 80 MG tablet TAKE 1 TABLET BY MOUTH EVERY DAY FOR CHOLESTEROL 30 tablet 2    meloxicam (MOBIC) 15 MG tablet TAKE 1 TABLET BY MOUTH DAILY 30 tablet 1    Fluticasone furoate-vilanterol (BREO ELLIPTA) 200-25 MCG/INH AEPB inhaler Inhale 1 puff into the lungs daily 1 each 5    Blood Glucose Monitoring Suppl (ONE TOUCH ULTRA MINI) w/Device KIT 1 kit by Does not apply route daily 1 kit 0    ONE TOUCH LANCETS MISC 1 each by Does not apply route daily 100 each 3    blood glucose test strips (ASCENSIA AUTODISC VI;ONE TOUCH ULTRA TEST VI) strip 1 each by In Vitro route daily 100 each 3    ipratropium-albuterol (DUONEB) 0.5-2.5 (3) MG/3ML SOLN nebulizer solution Take 1 aerosol every 4 hours for 2 days and then as needed for shortness of breath coughing and wheezing 360 mL 3    Cholecalciferol (VITAMIN D) 2000 units TABS tablet Take 1 tablet by mouth daily 30 tablet     cetirizine (ZYRTEC) 10 MG tablet Take 10 mg by mouth daily as needed for Allergies      Nebulizers (COMPRESSOR/NEBULIZER) MISC 1 Units by Does not apply route 4 times daily 1 each 3    gabapentin (NEURONTIN) 600 MG tablet Take 1 tablet by mouth 2 times daily for 30 days. 60 tablet 1     No facility-administered medications prior to visit. REVIEW OF SYSTEMS:    Respiratory: Negative for apnea, chest tightness and shortness of breath or change in baseline breathing. PHYSICAL EXAM:   Nursing note and vitals reviewed. There were no vitals taken for this visit. Constitutional: She appears well-developed and well-nourished. No acute distress. Cardiovascular: Normal rate, regular rhythm, normal heart sounds, and does not have murmur. Pulmonary/Chest: Effort normal. No respiratory distress. She does not have wheezes in the lung fields. She has no rales. Neurological/Psychiatric:She is alert and oriented to person, place, and time. Coordination is  normal.  Her mood isAppropriate and affect is Flat/blunted and Anxious . Her    IMPRESSION:   1. Chronic pain syndrome    2. Osteoarthritis of spine with radiculopathy, lumbar region    3. DDD (degenerative disc disease), lumbar    4. Fibromyalgia    5. Lumbar spondylosis    6. Degeneration of lumbar or lumbosacral intervertebral disc    7. Trochanteric bursitis of right hip        PLAN:  Informed verbal consent was obtained  -continue with current opioid regimen Percocet 4 per day  -She was advised to increase fluids ( 5-7  glasses of fluid daily), limit caffeine, avoid cheese products, increase dietary fiber, increase activity and exercise as tolerated and relax regularly and enjoy meals   -walking/stretching exercises as advised    -Continue with all other adjuvant medications as before      Current Outpatient Medications   Medication Sig Dispense Refill    albuterol sulfate  (90 Base) MCG/ACT inhaler Inhale 2 puffs into the lungs every 6 hours as needed for Shortness of Breath 1 Inhaler 11    traZODone (DESYREL) 50 MG tablet Take 1-2 tablets by mouth nightly 60 tablet 1    DULoxetine (CYMBALTA) 60 MG extended release capsule Take one tablet at night 30 capsule 1    pantoprazole (PROTONIX) 40 MG tablet Take 1 tablet by mouth daily 30 tablet 1    oxyCODONE-acetaminophen (PERCOCET) 7.5-325 MG per tablet Take 1 tablet by mouth every 6 hours as needed for Pain for up to 28 days.  Max 4 a day 112 tablet 0    methocarbamol (ROBAXIN) 500 MG tablet Take 1 tablet by mouth 2 times daily 60 tablet 0    atorvastatin (LIPITOR) 80 MG tablet TAKE 1 TABLET BY MOUTH EVERY DAY FOR CHOLESTEROL 30 tablet 2    meloxicam (MOBIC) 15 MG tablet TAKE 1 TABLET BY MOUTH DAILY 30 tablet 1  Fluticasone furoate-vilanterol (BREO ELLIPTA) 200-25 MCG/INH AEPB inhaler Inhale 1 puff into the lungs daily 1 each 5    Blood Glucose Monitoring Suppl (ONE TOUCH ULTRA MINI) w/Device KIT 1 kit by Does not apply route daily 1 kit 0    ONE TOUCH LANCETS MISC 1 each by Does not apply route daily 100 each 3    blood glucose test strips (ASCENSIA AUTODISC VI;ONE TOUCH ULTRA TEST VI) strip 1 each by In Vitro route daily 100 each 3    ipratropium-albuterol (DUONEB) 0.5-2.5 (3) MG/3ML SOLN nebulizer solution Take 1 aerosol every 4 hours for 2 days and then as needed for shortness of breath coughing and wheezing 360 mL 3    Cholecalciferol (VITAMIN D) 2000 units TABS tablet Take 1 tablet by mouth daily 30 tablet     cetirizine (ZYRTEC) 10 MG tablet Take 10 mg by mouth daily as needed for Allergies      Nebulizers (COMPRESSOR/NEBULIZER) MISC 1 Units by Does not apply route 4 times daily 1 each 3    gabapentin (NEURONTIN) 600 MG tablet Take 1 tablet by mouth 2 times daily for 30 days. 60 tablet 1     No current facility-administered medications for this visit. I will continue her current medication regimen  which is part of the above treatment schedule. It has been helping with Ms. Kendrick Deep chronic  medical problems which for this visit include:   Diagnoses of Muscle cramping, Chronic pain syndrome, Osteoarthritis of spine with radiculopathy, lumbar region, DDD (degenerative disc disease), lumbar, Fibromyalgia, and Lumbar spondylosis were pertinent to this visit. Risks and benefits of the medications and other alternative treatments  including no treatment were discussed with the patient. The common side effects of these medications were also explained to the patient. Informed verbal consent was obtained. Goals of current treatment regimen include improvement in pain, restoration of functioning- with focus on improvement in physical performance, general activity, work or disability,emotional distress, health care utilization and  decreased medication consumption. Will continue to monitor progress towards achieving/maintaining therapeutic goals with special emphasis on  1. Improvement in perceived interfernce  of pain with ADL's. Ability to do home exercises independently. Ability to do household chores indoor and/or outdoor work and social and leisure activities. Improve psychosocial and physical functioning. - she is showing progression towards this treatment goal with the current regimen. She was advised against drinking alcohol with the narcotic pain medicines, advised against driving or handling machinery while adjusting the dose of medicines or if having cognitive  issues related to the current medications. Risk of overdose and death, if medicines not taken as prescribed, were also discussed. If the patient develops new symptoms or if the symptoms worsen, the patient should call the office. While transcribing every attempt was made to maintain the accuracy of the note in terms of it's contents,there may have been some errors made inadvertently. Thank you for allowing me to participate in the care of this patient.     Salomón Gonsalves MD.    Cc: HECTOR Sullivan - CNP

## 2021-02-10 DIAGNOSIS — G89.4 CHRONIC PAIN SYNDROME: ICD-10-CM

## 2021-02-10 DIAGNOSIS — R25.2 MUSCLE CRAMPING: ICD-10-CM

## 2021-02-10 RX ORDER — MAGNESIUM OXIDE 400 MG/1
TABLET ORAL
Qty: 30 TABLET | OUTPATIENT
Start: 2021-02-10

## 2021-02-22 DIAGNOSIS — I73.9 CLAUDICATION (HCC): ICD-10-CM

## 2021-02-22 DIAGNOSIS — E78.00 HYPERCHOLESTEREMIA: ICD-10-CM

## 2021-02-22 DIAGNOSIS — E11.9 CONTROLLED TYPE 2 DIABETES MELLITUS WITHOUT COMPLICATION, WITHOUT LONG-TERM CURRENT USE OF INSULIN (HCC): ICD-10-CM

## 2021-02-22 DIAGNOSIS — J43.9 PULMONARY EMPHYSEMA, UNSPECIFIED EMPHYSEMA TYPE (HCC): ICD-10-CM

## 2021-02-22 LAB
A/G RATIO: 1.7 (ref 1.1–2.2)
ALBUMIN SERPL-MCNC: 4.4 G/DL (ref 3.4–5)
ALP BLD-CCNC: 81 U/L (ref 40–129)
ALT SERPL-CCNC: 14 U/L (ref 10–40)
ANION GAP SERPL CALCULATED.3IONS-SCNC: 16 MMOL/L (ref 3–16)
AST SERPL-CCNC: 12 U/L (ref 15–37)
BASOPHILS ABSOLUTE: 0 K/UL (ref 0–0.2)
BASOPHILS RELATIVE PERCENT: 0.7 %
BILIRUB SERPL-MCNC: 0.4 MG/DL (ref 0–1)
BUN BLDV-MCNC: 15 MG/DL (ref 7–20)
CALCIUM SERPL-MCNC: 10.2 MG/DL (ref 8.3–10.6)
CHLORIDE BLD-SCNC: 105 MMOL/L (ref 99–110)
CHOLESTEROL, TOTAL: 175 MG/DL (ref 0–199)
CO2: 27 MMOL/L (ref 21–32)
CREAT SERPL-MCNC: 0.9 MG/DL (ref 0.6–1.2)
EOSINOPHILS ABSOLUTE: 0.3 K/UL (ref 0–0.6)
EOSINOPHILS RELATIVE PERCENT: 4.4 %
GFR AFRICAN AMERICAN: >60
GFR NON-AFRICAN AMERICAN: >60
GLOBULIN: 2.6 G/DL
GLUCOSE BLD-MCNC: 115 MG/DL (ref 70–99)
HCT VFR BLD CALC: 45.1 % (ref 36–48)
HDLC SERPL-MCNC: 57 MG/DL (ref 40–60)
HEMOGLOBIN: 15.1 G/DL (ref 12–16)
LDL CHOLESTEROL CALCULATED: 86 MG/DL
LYMPHOCYTES ABSOLUTE: 1.6 K/UL (ref 1–5.1)
LYMPHOCYTES RELATIVE PERCENT: 25.1 %
MCH RBC QN AUTO: 32.3 PG (ref 26–34)
MCHC RBC AUTO-ENTMCNC: 33.5 G/DL (ref 31–36)
MCV RBC AUTO: 96.6 FL (ref 80–100)
MONOCYTES ABSOLUTE: 0.4 K/UL (ref 0–1.3)
MONOCYTES RELATIVE PERCENT: 6.6 %
NEUTROPHILS ABSOLUTE: 4 K/UL (ref 1.7–7.7)
NEUTROPHILS RELATIVE PERCENT: 63.2 %
PDW BLD-RTO: 14.2 % (ref 12.4–15.4)
PLATELET # BLD: 278 K/UL (ref 135–450)
PMV BLD AUTO: 7.8 FL (ref 5–10.5)
POTASSIUM SERPL-SCNC: 4.7 MMOL/L (ref 3.5–5.1)
RBC # BLD: 4.67 M/UL (ref 4–5.2)
SODIUM BLD-SCNC: 148 MMOL/L (ref 136–145)
TOTAL PROTEIN: 7 G/DL (ref 6.4–8.2)
TRIGL SERPL-MCNC: 162 MG/DL (ref 0–150)
VLDLC SERPL CALC-MCNC: 32 MG/DL
WBC # BLD: 6.3 K/UL (ref 4–11)

## 2021-02-23 LAB
ESTIMATED AVERAGE GLUCOSE: 122.6 MG/DL
HBA1C MFR BLD: 5.9 %

## 2021-03-08 ENCOUNTER — IMMUNIZATION (OUTPATIENT)
Dept: PRIMARY CARE CLINIC | Age: 76
End: 2021-03-08
Payer: MEDICARE

## 2021-03-08 PROCEDURE — 91301 COVID-19, MODERNA VACCINE 100MCG/0.5ML DOSE: CPT

## 2021-03-08 PROCEDURE — 0011A COVID-19, MODERNA VACCINE 100MCG/0.5ML DOSE: CPT

## 2021-03-09 ENCOUNTER — VIRTUAL VISIT (OUTPATIENT)
Dept: PAIN MANAGEMENT | Age: 76
End: 2021-03-09
Payer: MEDICARE

## 2021-03-09 DIAGNOSIS — M79.7 FIBROMYALGIA: ICD-10-CM

## 2021-03-09 DIAGNOSIS — R25.2 MUSCLE CRAMPING: ICD-10-CM

## 2021-03-09 DIAGNOSIS — M47.26 OSTEOARTHRITIS OF SPINE WITH RADICULOPATHY, LUMBAR REGION: ICD-10-CM

## 2021-03-09 DIAGNOSIS — M70.61 TROCHANTERIC BURSITIS OF RIGHT HIP: ICD-10-CM

## 2021-03-09 DIAGNOSIS — G89.4 CHRONIC PAIN SYNDROME: ICD-10-CM

## 2021-03-09 DIAGNOSIS — M47.816 LUMBAR SPONDYLOSIS: ICD-10-CM

## 2021-03-09 DIAGNOSIS — M51.37 DEGENERATION OF LUMBAR OR LUMBOSACRAL INTERVERTEBRAL DISC: ICD-10-CM

## 2021-03-09 DIAGNOSIS — M51.36 DDD (DEGENERATIVE DISC DISEASE), LUMBAR: ICD-10-CM

## 2021-03-09 PROCEDURE — 4040F PNEUMOC VAC/ADMIN/RCVD: CPT | Performed by: INTERNAL MEDICINE

## 2021-03-09 PROCEDURE — G8399 PT W/DXA RESULTS DOCUMENT: HCPCS | Performed by: INTERNAL MEDICINE

## 2021-03-09 PROCEDURE — 3017F COLORECTAL CA SCREEN DOC REV: CPT | Performed by: INTERNAL MEDICINE

## 2021-03-09 PROCEDURE — G8427 DOCREV CUR MEDS BY ELIG CLIN: HCPCS | Performed by: INTERNAL MEDICINE

## 2021-03-09 PROCEDURE — 1090F PRES/ABSN URINE INCON ASSESS: CPT | Performed by: INTERNAL MEDICINE

## 2021-03-09 PROCEDURE — 99213 OFFICE O/P EST LOW 20 MIN: CPT | Performed by: INTERNAL MEDICINE

## 2021-03-09 PROCEDURE — 1123F ACP DISCUSS/DSCN MKR DOCD: CPT | Performed by: INTERNAL MEDICINE

## 2021-03-09 RX ORDER — MELOXICAM 15 MG/1
TABLET ORAL
Qty: 30 TABLET | Refills: 1 | Status: SHIPPED | OUTPATIENT
Start: 2021-03-09 | End: 2021-04-06 | Stop reason: SDUPTHER

## 2021-03-09 RX ORDER — PANTOPRAZOLE SODIUM 40 MG/1
40 TABLET, DELAYED RELEASE ORAL DAILY
Qty: 30 TABLET | Refills: 1 | Status: SHIPPED | OUTPATIENT
Start: 2021-03-09 | End: 2021-04-06 | Stop reason: SDUPTHER

## 2021-03-09 RX ORDER — OXYCODONE AND ACETAMINOPHEN 7.5; 325 MG/1; MG/1
1 TABLET ORAL EVERY 6 HOURS PRN
Qty: 112 TABLET | Refills: 0 | Status: SHIPPED | OUTPATIENT
Start: 2021-03-09 | End: 2021-04-06 | Stop reason: SDUPTHER

## 2021-03-09 NOTE — PROGRESS NOTES
TELE HEALTH VISIT (AUDIO-VISUAL)    Pursuant to the emergency declaration under the [facility], [facility] waiver authority and the [employer] Act, this Virtual  Visit was conducted, with patient's/legal guardian's consent, to reduce the patient's risk of exposure to COVID-19 and provide continuity of care for an established patient. Service is  provided through a video synchronous discussion virtually to substitute for in-person clinic visit. Due to this being a TeleHealth encounter (During KIH-15 public health emergency), evaluation of the following organ systems was limited: Vitals/Constitutional/EENT/Resp/CV/GI//MS/Neuro/Skin/Mwep-Kexy-Hck. Jonanorma Win  1945  [id number]    Ms. Kate Trevino is being seen virtually for a follow up visit using one of the following techniques  [employer] Du[employer]  Informed verbal consent to the virtual visit was obtained from Ms. Kate Trevino. Risks associated with HIPPA compliance with the virtual visit was explained to the patient. Ms. Kate Trevino is at her residence and Dr. Serene Nolan is in his office. HISTORY OF PRESENT ILLNESS:  Ms. Kate Trevino is a 70 y.o. female returns for a follow up visit for multiple medical problems. Her current presenting problems are   1. Chronic pain syndrome    2. Muscle cramping    3. Osteoarthritis of spine with radiculopathy, lumbar region    4. DDD (degenerative disc disease), lumbar    5. Fibromyalgia    6. Lumbar spondylosis    7. Degeneration of lumbar or lumbosacral intervertebral disc    8. Trochanteric bursitis of right hip    . As per information/history obtained from the PADT(patient assessment and documentation tool) - She complains of pain in the lower back She rates the pain 6/10 and describes it as sharp, aching. Pain is made worse by: standing. Current treatment regimen has helped relieve about 40% of the pain.   She denies side effects from the current pain regimen. Patient reports that since the last follow up visit the physical functioning is unchanged, family/social relationships are unchanged, mood is unchanged and sleep patterns are unchanged, and that the overall functioning is unchanged. Patient denies neurological bowel or bladder. Patient denies misusing/abusing her narcotic pain medications or using any illegal drugs. There are No indicators for possible drug abuse, addiction or diversion problems. Upon obtaining the medical history from Ms. Darlin Sandoval regarding today's office visit for her presenting problems, patient states she has been doing fair, but the pain has been worse the last few days. She mentions she has been trying to walk. She says the pain has been worse the last few days. She reports she has been complaint with her regimen. She mentions she is using Percocet 4 per day along with other adjuvants. She denies any constipation symptoms. ALLERGIES: Patients list of allergies were reviewed     MEDICATIONS: Ms. Darlin Sandoval list of medications were reviewed. Her current medications are   Outpatient Medications Prior to Visit   Medication Sig Dispense Refill    atorvastatin (LIPITOR) 80 MG tablet TAKE 1 TABLET BY MOUTH EVERY DAY FOR CHOLESTEROL 90 tablet 1    traZODone (DESYREL) 50 MG tablet Take 1-2 tablets by mouth nightly 60 tablet 1    DULoxetine (CYMBALTA) 60 MG extended release capsule Take one tablet at night 30 capsule 1    pantoprazole (PROTONIX) 40 MG tablet Take 1 tablet by mouth daily 30 tablet 1    oxyCODONE-acetaminophen (PERCOCET) 7.5-325 MG per tablet Take 1 tablet by mouth every 6 hours as needed for Pain for up to 28 days. Max 4 a day 112 tablet 0    methocarbamol (ROBAXIN) 500 MG tablet Take 1 tablet by mouth 2 times daily 60 tablet 1    gabapentin (NEURONTIN) 600 MG tablet Take 1 tablet by mouth 2 times daily for 30 days.  60 tablet 1    albuterol sulfate  (90 Base) MCG/ACT inhaler Inhale 2 puffs into the lungs every 6 hours as needed for Shortness of Breath 1 Inhaler 11    meloxicam (MOBIC) 15 MG tablet TAKE 1 TABLET BY MOUTH DAILY 30 tablet 1    Fluticasone furoate-vilanterol (BREO ELLIPTA) 200-25 MCG/INH AEPB inhaler Inhale 1 puff into the lungs daily 1 each 5    Blood Glucose Monitoring Suppl (ONE TOUCH ULTRA MINI) w/Device KIT 1 kit by Does not apply route daily 1 kit 0    ONE TOUCH LANCETS MISC 1 each by Does not apply route daily 100 each 3    blood glucose test strips (ASCENSIA AUTODISC VI;ONE TOUCH ULTRA TEST VI) strip 1 each by In Vitro route daily 100 each 3    ipratropium-albuterol (DUONEB) 0.5-2.5 (3) MG/3ML SOLN nebulizer solution Take 1 aerosol every 4 hours for 2 days and then as needed for shortness of breath coughing and wheezing 360 mL 3    Cholecalciferol (VITAMIN D) 2000 units TABS tablet Take 1 tablet by mouth daily 30 tablet     cetirizine (ZYRTEC) 10 MG tablet Take 10 mg by mouth daily as needed for Allergies      Nebulizers (COMPRESSOR/NEBULIZER) MISC 1 Units by Does not apply route 4 times daily 1 each 3     No facility-administered medications prior to visit. REVIEW OF SYSTEMS:    Respiratory: Negative for apnea, chest tightness and shortness of breath or change in baseline breathing. PHYSICAL EXAM:   Nursing note and vitals reviewed. There were no vitals taken for this visit. Constitutional: She appears well-developed and well-nourished. No acute distress. Cardiovascular: Normal rate, regular rhythm, normal heart sounds, and does not have murmur. Pulmonary/Chest: Effort normal. No respiratory distress. She does not have wheezes in the lung fields. She has no rales. Neurological/Psychiatric:She is alert and oriented to person, place, and time. Coordination is  normal.  Her mood isAppropriate and affect is Neutral/Euthymic(normal) . IMPRESSION:   1. Chronic pain syndrome    2. Muscle cramping    3. Osteoarthritis of spine with radiculopathy, lumbar region    4. DDD (degenerative disc disease), lumbar    5. Fibromyalgia    6. Lumbar spondylosis    7. Degeneration of lumbar or lumbosacral intervertebral disc    8. Trochanteric bursitis of right hip        PLAN:  Informed verbal consent was obtained  -Continue with current opioid regimen Percocet 4 per day   -Adv Biofeedback, relaxation and meditation techniques. Referral to psychologist for CBT was also discussed with patient   -Walking as tolerated 20-30 minutes daily   -ROM/Stretching exercises as advised    Current Outpatient Medications   Medication Sig Dispense Refill    atorvastatin (LIPITOR) 80 MG tablet TAKE 1 TABLET BY MOUTH EVERY DAY FOR CHOLESTEROL 90 tablet 1    traZODone (DESYREL) 50 MG tablet Take 1-2 tablets by mouth nightly 60 tablet 1    DULoxetine (CYMBALTA) 60 MG extended release capsule Take one tablet at night 30 capsule 1    pantoprazole (PROTONIX) 40 MG tablet Take 1 tablet by mouth daily 30 tablet 1    oxyCODONE-acetaminophen (PERCOCET) 7.5-325 MG per tablet Take 1 tablet by mouth every 6 hours as needed for Pain for up to 28 days. Max 4 a day 112 tablet 0    methocarbamol (ROBAXIN) 500 MG tablet Take 1 tablet by mouth 2 times daily 60 tablet 1    gabapentin (NEURONTIN) 600 MG tablet Take 1 tablet by mouth 2 times daily for 30 days.  60 tablet 1    albuterol sulfate  (90 Base) MCG/ACT inhaler Inhale 2 puffs into the lungs every 6 hours as needed for Shortness of Breath 1 Inhaler 11    meloxicam (MOBIC) 15 MG tablet TAKE 1 TABLET BY MOUTH DAILY 30 tablet 1    Fluticasone furoate-vilanterol (BREO ELLIPTA) 200-25 MCG/INH AEPB inhaler Inhale 1 puff into the lungs daily 1 each 5    Blood Glucose Monitoring Suppl (ONE TOUCH ULTRA MINI) w/Device KIT 1 kit by Does not apply route daily 1 kit 0    ONE TOUCH LANCETS MISC 1 each by Does not apply route daily 100 each 3    blood glucose test strips (ASCENSIA AUTODISC VI;ONE TOUCH ULTRA TEST VI) strip 1 each by In Vitro route daily 100 each 3  ipratropium-albuterol (DUONEB) 0.5-2.5 (3) MG/3ML SOLN nebulizer solution Take 1 aerosol every 4 hours for 2 days and then as needed for shortness of breath coughing and wheezing 360 mL 3    Cholecalciferol (VITAMIN D) 2000 units TABS tablet Take 1 tablet by mouth daily 30 tablet     cetirizine (ZYRTEC) 10 MG tablet Take 10 mg by mouth daily as needed for Allergies      Nebulizers (COMPRESSOR/NEBULIZER) MISC 1 Units by Does not apply route 4 times daily 1 each 3     No current facility-administered medications for this visit. I will continue her current medication regimen  which is part of the above treatment schedule. It has been helping with Ms. Billy Sarabia chronic  medical problems which for this visit include:   Diagnoses of Chronic pain syndrome, Muscle cramping, Osteoarthritis of spine with radiculopathy, lumbar region, DDD (degenerative disc disease), lumbar, Fibromyalgia, Lumbar spondylosis, Degeneration of lumbar or lumbosacral intervertebral disc, and Trochanteric bursitis of right hip were pertinent to this visit. Risks and benefits of the medications and other alternative treatments  including no treatment were discussed with the patient. The common side effects of these medications were also explained to the patient. Informed verbal consent was obtained. Goals of current treatment regimen include improvement in pain, restoration of functioning- with focus on improvement in physical performance, general activity, work or disability,emotional distress, health care utilization and  decreased medication consumption. Will continue to monitor progress towards achieving/maintaining therapeutic goals with special emphasis on  1. Improvement in perceived interfernce  of pain with ADL's. Ability to do home exercises independently. Ability to do household chores indoor and/or outdoor work and social and leisure activities. Improve psychosocial and physical functioning. - she is showing progression towards this treatment goal with the current regimen. She was advised against drinking alcohol with the narcotic pain medicines, advised against driving or handling machinery while adjusting the dose of medicines or if having cognitive  issues related to the current medications. Risk of overdose and death, if medicines not taken as prescribed, were also discussed. If the patient develops new symptoms or if the symptoms worsen, the patient should call the office. While transcribing every attempt was made to maintain the accuracy of the note in terms of it's contents,there may have been some errors made inadvertently. Thank you for allowing me to participate in the care of this patient.     Meredith Lewis MD.    Cc: Kyra Rivas, HECTOR - CNP

## 2021-04-05 ENCOUNTER — IMMUNIZATION (OUTPATIENT)
Dept: PRIMARY CARE CLINIC | Age: 76
End: 2021-04-05
Payer: MEDICARE

## 2021-04-05 DIAGNOSIS — M79.7 FIBROMYALGIA: ICD-10-CM

## 2021-04-05 DIAGNOSIS — G89.4 CHRONIC PAIN SYNDROME: ICD-10-CM

## 2021-04-05 DIAGNOSIS — M47.816 LUMBAR SPONDYLOSIS: ICD-10-CM

## 2021-04-05 DIAGNOSIS — M47.26 OSTEOARTHRITIS OF SPINE WITH RADICULOPATHY, LUMBAR REGION: ICD-10-CM

## 2021-04-05 DIAGNOSIS — M70.61 TROCHANTERIC BURSITIS OF RIGHT HIP: ICD-10-CM

## 2021-04-05 DIAGNOSIS — M51.36 DDD (DEGENERATIVE DISC DISEASE), LUMBAR: ICD-10-CM

## 2021-04-05 DIAGNOSIS — M51.37 DEGENERATION OF LUMBAR OR LUMBOSACRAL INTERVERTEBRAL DISC: ICD-10-CM

## 2021-04-05 PROCEDURE — 91301 COVID-19, MODERNA VACCINE 100MCG/0.5ML DOSE: CPT | Performed by: FAMILY MEDICINE

## 2021-04-05 PROCEDURE — 0012A COVID-19, MODERNA VACCINE 100MCG/0.5ML DOSE: CPT | Performed by: FAMILY MEDICINE

## 2021-04-06 ENCOUNTER — VIRTUAL VISIT (OUTPATIENT)
Dept: PAIN MANAGEMENT | Age: 76
End: 2021-04-06
Payer: MEDICARE

## 2021-04-06 DIAGNOSIS — M70.61 TROCHANTERIC BURSITIS OF RIGHT HIP: ICD-10-CM

## 2021-04-06 DIAGNOSIS — M79.7 FIBROMYALGIA: ICD-10-CM

## 2021-04-06 DIAGNOSIS — M51.37 DEGENERATION OF LUMBAR OR LUMBOSACRAL INTERVERTEBRAL DISC: ICD-10-CM

## 2021-04-06 DIAGNOSIS — M51.36 DDD (DEGENERATIVE DISC DISEASE), LUMBAR: ICD-10-CM

## 2021-04-06 DIAGNOSIS — M47.26 OSTEOARTHRITIS OF SPINE WITH RADICULOPATHY, LUMBAR REGION: ICD-10-CM

## 2021-04-06 DIAGNOSIS — M47.816 LUMBAR SPONDYLOSIS: ICD-10-CM

## 2021-04-06 DIAGNOSIS — G89.4 CHRONIC PAIN SYNDROME: ICD-10-CM

## 2021-04-06 PROCEDURE — 3017F COLORECTAL CA SCREEN DOC REV: CPT | Performed by: INTERNAL MEDICINE

## 2021-04-06 PROCEDURE — G8399 PT W/DXA RESULTS DOCUMENT: HCPCS | Performed by: INTERNAL MEDICINE

## 2021-04-06 PROCEDURE — 1090F PRES/ABSN URINE INCON ASSESS: CPT | Performed by: INTERNAL MEDICINE

## 2021-04-06 PROCEDURE — 4040F PNEUMOC VAC/ADMIN/RCVD: CPT | Performed by: INTERNAL MEDICINE

## 2021-04-06 PROCEDURE — 99213 OFFICE O/P EST LOW 20 MIN: CPT | Performed by: INTERNAL MEDICINE

## 2021-04-06 PROCEDURE — G8427 DOCREV CUR MEDS BY ELIG CLIN: HCPCS | Performed by: INTERNAL MEDICINE

## 2021-04-06 PROCEDURE — 1123F ACP DISCUSS/DSCN MKR DOCD: CPT | Performed by: INTERNAL MEDICINE

## 2021-04-06 RX ORDER — TRAZODONE HYDROCHLORIDE 50 MG/1
50-100 TABLET ORAL NIGHTLY
Qty: 60 TABLET | Refills: 1 | Status: SHIPPED | OUTPATIENT
Start: 2021-04-06 | End: 2021-05-04 | Stop reason: SDUPTHER

## 2021-04-06 RX ORDER — PANTOPRAZOLE SODIUM 40 MG/1
40 TABLET, DELAYED RELEASE ORAL DAILY
Qty: 30 TABLET | Refills: 1 | Status: SHIPPED | OUTPATIENT
Start: 2021-04-06 | End: 2021-05-04 | Stop reason: SDUPTHER

## 2021-04-06 RX ORDER — GABAPENTIN 600 MG/1
600 TABLET ORAL 2 TIMES DAILY
Qty: 60 TABLET | Refills: 1 | Status: SHIPPED | OUTPATIENT
Start: 2021-04-06 | End: 2021-05-04 | Stop reason: SDUPTHER

## 2021-04-06 RX ORDER — OXYCODONE AND ACETAMINOPHEN 7.5; 325 MG/1; MG/1
1 TABLET ORAL EVERY 6 HOURS PRN
Qty: 112 TABLET | Refills: 0 | Status: SHIPPED | OUTPATIENT
Start: 2021-04-06 | End: 2021-05-04 | Stop reason: SDUPTHER

## 2021-04-06 RX ORDER — METHOCARBAMOL 500 MG/1
TABLET, FILM COATED ORAL
Qty: 30 TABLET | Refills: 0 | Status: SHIPPED | OUTPATIENT
Start: 2021-04-06 | End: 2021-04-06 | Stop reason: SDUPTHER

## 2021-04-06 RX ORDER — METHOCARBAMOL 500 MG/1
TABLET, FILM COATED ORAL
Qty: 30 TABLET | Refills: 1 | Status: SHIPPED | OUTPATIENT
Start: 2021-04-06 | End: 2021-05-04 | Stop reason: SDUPTHER

## 2021-04-06 RX ORDER — DULOXETIN HYDROCHLORIDE 60 MG/1
CAPSULE, DELAYED RELEASE ORAL
Qty: 30 CAPSULE | Refills: 1 | Status: SHIPPED | OUTPATIENT
Start: 2021-04-06 | End: 2021-05-04 | Stop reason: SDUPTHER

## 2021-04-06 RX ORDER — MELOXICAM 15 MG/1
TABLET ORAL
Qty: 30 TABLET | Refills: 1 | Status: SHIPPED | OUTPATIENT
Start: 2021-04-06 | End: 2021-05-04 | Stop reason: SDUPTHER

## 2021-04-06 NOTE — PROGRESS NOTES
made worse by: standing. Current treatment regimen has helped relieve about 40% of the pain. She denies side effects from the current pain regimen. Patient reports that since the last follow up visit the physical functioning is unchanged, family/social relationships are unchanged, mood is unchanged and sleep patterns are unchanged, and that the overall functioning is unchanged. Patient denies neurological bowel or bladder. Patient denies misusing/abusing her narcotic pain medications or using any illegal drugs. There are No indicators for possible drug abuse, addiction or diversion problems. Upon obtaining the medical history from Ms. Ni Cornelius regarding today's office visit for her presenting problems, patient states she has been doing fair, her pain has been baseline. She states she is using all her medications as before. Patient mentions she is using 4 per day along with Neurontin 1200 mg and other adjuvants. Patient reports  weight has been stable. ALLERGIES: Patients list of allergies were reviewed     MEDICATIONS: Ms. Ni Cornelius list of medications were reviewed. Her current medications are   Outpatient Medications Prior to Visit   Medication Sig Dispense Refill    methocarbamol (ROBAXIN) 500 MG tablet TAKE 1 TABLET BY MOUTH TWICE A DAY 30 tablet 0    pantoprazole (PROTONIX) 40 MG tablet Take 1 tablet by mouth daily 30 tablet 1    oxyCODONE-acetaminophen (PERCOCET) 7.5-325 MG per tablet Take 1 tablet by mouth every 6 hours as needed for Pain for up to 28 days.  Max 4 a day 112 tablet 0    meloxicam (MOBIC) 15 MG tablet TAKE 1 TABLET BY MOUTH DAILY 30 tablet 1    atorvastatin (LIPITOR) 80 MG tablet TAKE 1 TABLET BY MOUTH EVERY DAY FOR CHOLESTEROL 90 tablet 1    traZODone (DESYREL) 50 MG tablet Take 1-2 tablets by mouth nightly 60 tablet 1    DULoxetine (CYMBALTA) 60 MG extended release capsule Take one tablet at night 30 capsule 1    albuterol sulfate  (90 Base) MCG/ACT inhaler Inhale 2 puffs into the lungs every 6 hours as needed for Shortness of Breath 1 Inhaler 11    Fluticasone furoate-vilanterol (BREO ELLIPTA) 200-25 MCG/INH AEPB inhaler Inhale 1 puff into the lungs daily 1 each 5    Blood Glucose Monitoring Suppl (ONE TOUCH ULTRA MINI) w/Device KIT 1 kit by Does not apply route daily 1 kit 0    ONE TOUCH LANCETS MISC 1 each by Does not apply route daily 100 each 3    blood glucose test strips (ASCENSIA AUTODISC VI;ONE TOUCH ULTRA TEST VI) strip 1 each by In Vitro route daily 100 each 3    ipratropium-albuterol (DUONEB) 0.5-2.5 (3) MG/3ML SOLN nebulizer solution Take 1 aerosol every 4 hours for 2 days and then as needed for shortness of breath coughing and wheezing 360 mL 3    Cholecalciferol (VITAMIN D) 2000 units TABS tablet Take 1 tablet by mouth daily 30 tablet     cetirizine (ZYRTEC) 10 MG tablet Take 10 mg by mouth daily as needed for Allergies      Nebulizers (COMPRESSOR/NEBULIZER) MISC 1 Units by Does not apply route 4 times daily 1 each 3    gabapentin (NEURONTIN) 600 MG tablet Take 1 tablet by mouth 2 times daily for 30 days. 60 tablet 1     No facility-administered medications prior to visit. REVIEW OF SYSTEMS:    Respiratory: Negative for apnea, chest tightness and shortness of breath or change in baseline breathing. PHYSICAL EXAM:   Nursing note and vitals reviewed. There were no vitals taken for this visit. Constitutional: She appears well-developed and well-nourished. No acute distress. Cardiovascular: Normal rate, regular rhythm, normal heart sounds, and does not have murmur. Pulmonary/Chest: Effort normal. No respiratory distress. She does not have wheezes in the lung fields. She has no rales. Neurological/Psychiatric:She is alert and oriented to person, place, and time. Coordination is  normal.  Her mood isAppropriate and affect is Flat/blunted and Anxious . Her    IMPRESSION:   1. Chronic pain syndrome    2.  Osteoarthritis of spine with radiculopathy, lumbar region    3. Fibromyalgia    4. DDD (degenerative disc disease), lumbar    5. Lumbar spondylosis    6. Degeneration of lumbar or lumbosacral intervertebral disc    7. Trochanteric bursitis of right hip        PLAN:  Informed verbal consent was obtained  -[place] was obtained and reviewed  for the last one year and no indicators of drug misuse  were found. Any other controlled substance prescriptions  seen on the record have been accounted for, I am aware of the patient receiving these medications. Flora Vazquez [place] record will be rechecked as part of office protocol.    -continue with current opioid regimen Percocet 4 per day  -Continue with all other adjuvant medications as before   -walking/stretching exercises as advised    -She was advised to increase fluids ( 5-7  glasses of fluid daily), limit caffeine, avoid cheese products, increase dietary fiber, increase activity and exercise as tolerated and relax regularly and enjoy meals      Current Outpatient Medications   Medication Sig Dispense Refill    methocarbamol (ROBAXIN) 500 MG tablet TAKE 1 TABLET BY MOUTH TWICE A DAY 30 tablet 0    pantoprazole (PROTONIX) 40 MG tablet Take 1 tablet by mouth daily 30 tablet 1    oxyCODONE-acetaminophen (PERCOCET) 7.5-325 MG per tablet Take 1 tablet by mouth every 6 hours as needed for Pain for up to 28 days.  Max 4 a day 112 tablet 0    meloxicam (MOBIC) 15 MG tablet TAKE 1 TABLET BY MOUTH DAILY 30 tablet 1    atorvastatin (LIPITOR) 80 MG tablet TAKE 1 TABLET BY MOUTH EVERY DAY FOR CHOLESTEROL 90 tablet 1    traZODone (DESYREL) 50 MG tablet Take 1-2 tablets by mouth nightly 60 tablet 1    DULoxetine (CYMBALTA) 60 MG extended release capsule Take one tablet at night 30 capsule 1    albuterol sulfate  (90 Base) MCG/ACT inhaler Inhale 2 puffs into the lungs every 6 hours as needed for Shortness of Breath 1 Inhaler 11    Fluticasone furoate-vilanterol (BREO ELLIPTA) 200-25 MCG/INH AEPB inhaler Inhale 1 puff into the lungs daily 1 each 5    Blood Glucose Monitoring Suppl (ONE TOUCH ULTRA MINI) w/Device KIT 1 kit by Does not apply route daily 1 kit 0    ONE TOUCH LANCETS MISC 1 each by Does not apply route daily 100 each 3    blood glucose test strips (ASCENSIA AUTODISC VI;ONE TOUCH ULTRA TEST VI) strip 1 each by In Vitro route daily 100 each 3    ipratropium-albuterol (DUONEB) 0.5-2.5 (3) MG/3ML SOLN nebulizer solution Take 1 aerosol every 4 hours for 2 days and then as needed for shortness of breath coughing and wheezing 360 mL 3    Cholecalciferol (VITAMIN D) 2000 units TABS tablet Take 1 tablet by mouth daily 30 tablet     cetirizine (ZYRTEC) 10 MG tablet Take 10 mg by mouth daily as needed for Allergies      Nebulizers (COMPRESSOR/NEBULIZER) MISC 1 Units by Does not apply route 4 times daily 1 each 3    gabapentin (NEURONTIN) 600 MG tablet Take 1 tablet by mouth 2 times daily for 30 days. 60 tablet 1     No current facility-administered medications for this visit. I will continue her current medication regimen  which is part of the above treatment schedule. It has been helping with Ms. Lis Fuchs chronic  medical problems which for this visit include:   Diagnoses of Chronic pain syndrome, Osteoarthritis of spine with radiculopathy, lumbar region, Fibromyalgia, DDD (degenerative disc disease), lumbar, Lumbar spondylosis, and Degeneration of lumbar or lumbosacral intervertebral disc were pertinent to this visit. Risks and benefits of the medications and other alternative treatments  including no treatment were discussed with the patient. The common side effects of these medications were also explained to the patient. Informed verbal consent was obtained.    Goals of current treatment regimen include improvement in pain, restoration of functioning- with focus on improvement in physical performance, general activity, work or disability,emotional distress, health care utilization and  decreased medication consumption. Will continue to monitor progress towards achieving/maintaining therapeutic goals with special emphasis on  1. Improvement in perceived interfernce  of pain with ADL's. Ability to do home exercises independently. Ability to do household chores indoor and/or outdoor work and social and leisure activities. Improve psychosocial and physical functioning. - she is showing progression towards this treatment goal with the current regimen. She was advised against drinking alcohol with the narcotic pain medicines, advised against driving or handling machinery while adjusting the dose of medicines or if having cognitive  issues related to the current medications. Risk of overdose and death, if medicines not taken as prescribed, were also discussed. If the patient develops new symptoms or if the symptoms worsen, the patient should call the office. While transcribing every attempt was made to maintain the accuracy of the note in terms of it's contents,there may have been some errors made inadvertently. Thank you for allowing me to participate in the care of this patient.     Мария Melvin MD.    Cc: Real Lee, HECTOR - CNP

## 2021-04-20 ENCOUNTER — OFFICE VISIT (OUTPATIENT)
Dept: FAMILY MEDICINE CLINIC | Age: 76
End: 2021-04-20
Payer: MEDICARE

## 2021-04-20 VITALS
DIASTOLIC BLOOD PRESSURE: 62 MMHG | SYSTOLIC BLOOD PRESSURE: 108 MMHG | RESPIRATION RATE: 16 BRPM | OXYGEN SATURATION: 94 % | TEMPERATURE: 98.2 F | WEIGHT: 129.2 LBS | HEIGHT: 64 IN | HEART RATE: 71 BPM | BODY MASS INDEX: 22.06 KG/M2

## 2021-04-20 DIAGNOSIS — Z71.89 ACP (ADVANCE CARE PLANNING): ICD-10-CM

## 2021-04-20 DIAGNOSIS — Z72.0 TOBACCO ABUSE: ICD-10-CM

## 2021-04-20 DIAGNOSIS — Z00.00 ROUTINE GENERAL MEDICAL EXAMINATION AT A HEALTH CARE FACILITY: ICD-10-CM

## 2021-04-20 DIAGNOSIS — E11.9 CONTROLLED TYPE 2 DIABETES MELLITUS WITHOUT COMPLICATION, WITHOUT LONG-TERM CURRENT USE OF INSULIN (HCC): ICD-10-CM

## 2021-04-20 DIAGNOSIS — M85.89 OSTEOPENIA OF MULTIPLE SITES: ICD-10-CM

## 2021-04-20 PROCEDURE — 3017F COLORECTAL CA SCREEN DOC REV: CPT | Performed by: NURSE PRACTITIONER

## 2021-04-20 PROCEDURE — 4040F PNEUMOC VAC/ADMIN/RCVD: CPT | Performed by: NURSE PRACTITIONER

## 2021-04-20 PROCEDURE — 1123F ACP DISCUSS/DSCN MKR DOCD: CPT | Performed by: NURSE PRACTITIONER

## 2021-04-20 PROCEDURE — 3044F HG A1C LEVEL LT 7.0%: CPT | Performed by: NURSE PRACTITIONER

## 2021-04-20 PROCEDURE — G0438 PPPS, INITIAL VISIT: HCPCS | Performed by: NURSE PRACTITIONER

## 2021-04-20 RX ORDER — VARENICLINE TARTRATE 0.5 MG/1
.5-1 TABLET, FILM COATED ORAL SEE ADMIN INSTRUCTIONS
Qty: 57 TABLET | Refills: 0 | Status: SHIPPED | OUTPATIENT
Start: 2021-04-20 | End: 2021-07-27

## 2021-04-20 RX ORDER — VARENICLINE TARTRATE 1 MG/1
1 TABLET, FILM COATED ORAL 2 TIMES DAILY
Qty: 180 TABLET | Refills: 0 | Status: SHIPPED | OUTPATIENT
Start: 2021-04-20 | End: 2021-08-26

## 2021-04-20 ASSESSMENT — PATIENT HEALTH QUESTIONNAIRE - PHQ9
SUM OF ALL RESPONSES TO PHQ QUESTIONS 1-9: 2
SUM OF ALL RESPONSES TO PHQ9 QUESTIONS 1 & 2: 2
1. LITTLE INTEREST OR PLEASURE IN DOING THINGS: 1
2. FEELING DOWN, DEPRESSED OR HOPELESS: 1
SUM OF ALL RESPONSES TO PHQ QUESTIONS 1-9: 2

## 2021-04-20 ASSESSMENT — LIFESTYLE VARIABLES: HOW OFTEN DO YOU HAVE A DRINK CONTAINING ALCOHOL: 0

## 2021-04-20 NOTE — PROGRESS NOTES
Medicare Annual Wellness Visit  Name: Lupe Vaughan Date: 2021   MRN: [id number] Sex: Female   Age: 70 y.o. Ethnicity: Non-/Non    : 1945 Race: Shin Caban is here for Medicare AWV    Screenings for behavioral, psychosocial and functional/safety risks, and cognitive dysfunction are all negative except as indicated below. These results, as well as other patient data from the [address] form, are documented in Flowsheets linked to this Encounter. Moved into an apt on the first floor Aug 2020. Likes living there. No Known Allergies      Prior to Visit Medications    Medication Sig Taking? Authorizing Provider   methocarbamol (ROBAXIN) 500 MG tablet TAKE 1 TABLET BY MOUTH TWICE A DAY Yes Anaya Holden MD   pantoprazole (PROTONIX) 40 MG tablet Take 1 tablet by mouth daily Yes Anaya Holden MD   oxyCODONE-acetaminophen (PERCOCET) 7.5-325 MG per tablet Take 1 tablet by mouth every 6 hours as needed for Pain for up to 28 days. Max 4 a day Yes Anaya Holden MD   meloxicam (MOBIC) 15 MG tablet TAKE 1 TABLET BY MOUTH DAILY Yes Anaya Holden MD   traZODone (DESYREL) 50 MG tablet Take 1-2 tablets by mouth nightly Yes Anaya Holden MD   DULoxetine (CYMBALTA) 60 MG extended release capsule Take one tablet at night Yes Anaya Holden MD   gabapentin (NEURONTIN) 600 MG tablet Take 1 tablet by mouth 2 times daily for 30 days.  Yes Anaya Holden MD   atorvastatin (LIPITOR) 80 MG tablet TAKE 1 TABLET BY MOUTH EVERY DAY FOR CHOLESTEROL Yes Willistine Stage, APRN - CNP   albuterol sulfate  (90 Base) MCG/ACT inhaler Inhale 2 puffs into the lungs every 6 hours as needed for Shortness of Breath Yes Sasha Mendoza MD   Fluticasone furoate-vilanterol (BREO ELLIPTA) 200-25 MCG/INH AEPB inhaler Inhale 1 puff into the lungs daily Yes Sasha Mendoza MD   Blood Glucose Monitoring Suppl (ONE TOUCH ULTRA MINI) w/Device KIT 1 kit by Does not apply route daily Yes HECTOR Rivero CNP   ONE TOUCH LANCETS MISC 1 each by Does not apply route daily Yes HECTOR Rivero CNP   blood glucose test strips (ASCENSIA AUTODISC VI;ONE TOUCH ULTRA TEST VI) strip 1 each by In Vitro route daily Yes HECTOR Rivero CNP   ipratropium-albuterol (DUONEB) 0.5-2.5 (3) MG/3ML SOLN nebulizer solution Take 1 aerosol every 4 hours for 2 days and then as needed for shortness of breath coughing and wheezing Yes Juda Hammans, MD   Cholecalciferol (VITAMIN D) 2000 units TABS tablet Take 1 tablet by mouth daily Yes HECTOR Rivero CNP   cetirizine (ZYRTEC) 10 MG tablet Take 10 mg by mouth daily as needed for Allergies Yes Historical Provider, MD   Nebulizers (COMPRESSOR/NEBULIZER) MISC 1 Units by Does not apply route 4 times daily Yes Divina Kaur MD         Past Medical History:   Diagnosis Date    Chronic pain syndrome     Colorectal polyps     COPD (chronic obstructive pulmonary disease) ([address]     CTS (carpal tunnel syndrome)     BILATERAL    DDD (degenerative disc disease), lumbar     Depression     Family hx of colon cancer     Fibromyalgia     Hyperlipemia     IBS (irritable bowel syndrome)     Lumbar spondylosis     New onset type 2 diabetes mellitus ([address] 2/3/2020    Urticaria, chronic        Past Surgical History:   Procedure Laterality Date    CARPAL TUNNEL RELEASE Bilateral     CERVICAL POLYP REMOVAL  9/2004    INTRACAPSULAR CATARACT EXTRACTION Left 6/23/2020    Phacoemulsification with intraocular lens implant performed by Kavita Ortega MD at [place] [id number] Right 7/14/2020    Phacoemulsification with intraocular lens implant performed by Kavita Ortega MD at [facility]      patient denies          Family History   Problem Relation Age of Onset    Cancer Father         COLON     Alzheimer's Disease Mother    Carlos Cowart Heart Failure Brother     Diabetes Daughter        Karyna (Including outside providers/suppliers regularly involved in providing care):   Patient Care Team:  HECTOR Frank CNP as PCP - General (Nurse Practitioner)  HECTOR Frank CNP as PCP - [facility] [place] [facility]  Oli Mccoy MD as Consulting Physician (Pulmonology)  Celestina Starr MD as Consulting Physician (Pain Management)  Jack Suero MD as Consulting Physician (Gastroenterology)  Anayeli Barajas MD as Consulting Physician (Ophthalmology)    Wt Readings from Last 3 Encounters:   04/20/21 129 lb 3.2 oz (58.6 kg)   01/18/21 129 lb (58.5 kg)   01/18/21 129 lb (58.5 kg)     Vitals:    04/20/21 1254   BP: 108/62   Site: Right Upper Arm   Position: Sitting   Cuff Size: Medium Adult   Pulse: 71   Resp: 16   Temp: 98.2 °F (36.8 °C)   TempSrc: Oral   SpO2: 94%   Weight: 129 lb 3.2 oz (58.6 kg)   Height: 5' 4\" (1.626 m)     Body mass index is 22.18 kg/m². Based upon direct observation of the patient, evaluation of cognition reveals recent and remote memory intact. General Appearance: alert and oriented to person, place and time, well developed and well- nourished, in no acute distress  Skin: warm and dry, no rash or erythema  Head: normocephalic and atraumatic  Eyes: pupils equal, round, and reactive to light, extraocular eye movements intact, conjunctivae normal  Pulmonary/Chest: clear to auscultation bilaterally- no wheezes, rales or rhonchi, normal air movement, no respiratory distress  Cardiovascular: normal rate, regular rhythm, normal S1 and S2, no murmurs, rubs, clicks, or gallops, distal pulses intact, no carotid bruits  Extremities: no cyanosis, clubbing or edema  Neurologic: reflexes normal and symmetric, no cranial nerve deficit, gait, coordination and speech normal  Sensory exam of the foot is normal, tested with the monofilament. Good pulses, no lesions or ulcers, good peripheral pulses.     Patient's evaluation/treatment for this issue  · No Living Will: [facility] Habits/Nutrition:  Health Habits/Nutrition  Do you exercise for at least 20 minutes 2-3 times per week?: (!) No  Have you lost any weight without trying in the past 3 months?: No  Do you eat only one meal per day?: No  Have you seen the dentist within the past year?: (!) No  Body mass index: 22.17  Health Habits/Nutrition Interventions:  · Inadequate physical activity:  patient is not ready to increase his/her physical activity level at this time  · Dental exam overdue:  patient encouraged to make appointment with his/her dentist     Safety:  Safety  Do you have working smoke detectors?: Yes  Have all throw rugs been removed or fastened?: (!) No  Do you have non-slip mats or surfaces in all bathtubs/showers?: Yes  Do all of your stairways have a railing or banister?: (!) No(no steps)  Are your doorways, halls and stairs free of clutter?: Yes  Do you always fasten your seatbelt when you are in a car?: Yes  Safety Interventions:  · Home safety tips provided     Personalized Preventive Plan   Current Health Maintenance Status  Immunization History   Administered Date(s) Administered    COVID-19, Moderna, PF, 100mcg/0.5mL 03/08/2021, 04/05/2021    Influenza Vaccine, unspecified formulation 01/10/2017    Influenza, High Dose (Fluzone 65 yrs and older) 11/04/2013, 01/21/2016, 09/01/2017, 10/30/2018, 09/18/2020    Influenza, Triv, inactivated, subunit, adjuvanted, IM (Fluad 65 yrs and older) 09/13/2019    Pneumococcal Conjugate 13-valent ([id number]) 01/19/2015    Pneumococcal Conjugate Vaccine 01/10/2017    Pneumococcal Polysaccharide ([id number]) 10/12/2012    Tdap (Boostrix, Adacel) 07/17/2007    Zoster Recombinant (Shingrix) 11/21/2019        Health Maintenance   Topic Date Due    DTaP/Tdap/Td vaccine (2 - Td) 07/17/2017    Annual Wellness Visit (AWV)  Never done    Shingles Vaccine (3 of 3) 01/16/2020    Diabetic foot exam  02/07/2021    Diabetic microalbuminuria test  02/07/2021    Breast cancer screen  08/29/2021    Colon cancer screen colonoscopy  10/12/2021    Low dose CT lung screening  01/11/2022    A1C test (Diabetic or Prediabetic)  02/22/2022    Lipid screen  02/22/2022    Diabetic retinal exam  12/17/2022    DEXA (modify frequency per FRAX score)  Completed    Flu vaccine  Completed    Pneumococcal 65+ years Vaccine  Completed    COVID-19 Vaccine  Completed    Hepatitis C screen  Completed    Hepatitis A vaccine  Aged Out    Hib vaccine  Aged Out    Meningococcal (ACWY) vaccine  Aged Out     Recommendations for [employer] Due: see orders and patient instructions/AVS.  . Recommended screening schedule for the next 5-10 years is provided to the patient in written form: see Patient Instructions/AVS.    Timmy Shaver was seen today for medicare aw. Diagnoses and all orders for this visit:    Routine general medical examination at a health care facility  Advised to obtain shingles and tetanus vaccines at her pharmacy. ACP (advance care planning)  -     [facility] Referral to ACP Clinical Specialist    Controlled type 2 diabetes mellitus without complication, without long-term current use of insulin ([facility])  -      DIABETES FOOT EXAM  -     MICROALBUMIN / CREATININE URINE RATIO  Diet controlled. Tobacco abuse-advised to quit--lung cancer screening neg 5/18--repeat low dose ct 1 yr  -     varenicline (CHANTIX) 0.5 MG tablet; Take 1-2 tablets by mouth See Admin Instructions 0.5mg DAILY for 3 days followed by 0.5mg TWICE DAILY for 4 days followed by 1mg TWICE DAILY  -     varenicline (CHANTIX) 1 MG tablet; Take 1 tablet by mouth 2 times daily    Osteopenia of multiple sites  -     DEXA BONE DENSITY AXIAL SKELETON; Future  Calcium with vitamin D BID.    Advised weight bearing exercises daily

## 2021-04-20 NOTE — PATIENT INSTRUCTIONS
Dr. Filomena Watson [address]  [place]     [address]      [phone]      Fax  [phone]    BAO MIJARES   [address], [facility] [place] [address], [phone]      Fax:  [address]       [address], [address]  [address], [phone]    Dr. Jovita Saba 54 [address] [place] [id number], [phone]      Personalized Preventive Plan for Jolene Saldanar - 4/20/2021  Medicare offers a range of preventive health benefits. Some of the tests and screenings are paid in full while other may be subject to a deductible, co-insurance, and/or copay. Some of these benefits include a comprehensive review of your medical history including lifestyle, illnesses that may run in your family, and various assessments and screenings as appropriate. After reviewing your medical record and screening and assessments performed today your provider may have ordered immunizations, labs, imaging, and/or referrals for you. A list of these orders (if applicable) as well as your Preventive Care list are included within your After Visit Summary for your review. Other Preventive Recommendations:    · A preventive eye exam performed by an eye specialist is recommended every 1-2 years to screen for glaucoma; cataracts, macular degeneration, and other eye disorders. · A preventive dental visit is recommended every 6 months. · Try to get at least 150 minutes of exercise per week or 10,000 steps per day on a pedometer . · Order or download the FREE \"Exercise & Physical Activity: Your Everyday Guide\" from [employer]. Call 6-532.174.1683 or search [employer] online. · You need 7365-1882 mg of calcium and 2856-1500 IU of vitamin D per day.  It is possible to meet your calcium requirement with diet alone, but a vitamin D supplement is usually necessary to meet this goal.  · When exposed to the sun, use a sunscreen that protects against both UVA and UVB radiation with an SPF of 30 or greater. Reapply every 2 to 3 hours or after sweating, drying off with a towel, or swimming. · Always wear a seat belt when traveling in a car. Always wear a helmet when riding a bicycle or motorcycle.

## 2021-04-21 LAB
CREATININE URINE: 148.8 MG/DL (ref 28–259)
MICROALBUMIN UR-MCNC: <1.2 MG/DL
MICROALBUMIN/CREAT UR-RTO: NORMAL MG/G (ref 0–30)

## 2021-05-04 ENCOUNTER — OFFICE VISIT (OUTPATIENT)
Dept: PAIN MANAGEMENT | Age: 76
End: 2021-05-04
Payer: MEDICARE

## 2021-05-04 VITALS
HEART RATE: 74 BPM | TEMPERATURE: 97.5 F | DIASTOLIC BLOOD PRESSURE: 65 MMHG | WEIGHT: 131 LBS | SYSTOLIC BLOOD PRESSURE: 116 MMHG | BODY MASS INDEX: 22.49 KG/M2

## 2021-05-04 DIAGNOSIS — M70.61 TROCHANTERIC BURSITIS OF RIGHT HIP: ICD-10-CM

## 2021-05-04 DIAGNOSIS — M51.36 DDD (DEGENERATIVE DISC DISEASE), LUMBAR: ICD-10-CM

## 2021-05-04 DIAGNOSIS — M47.26 OSTEOARTHRITIS OF SPINE WITH RADICULOPATHY, LUMBAR REGION: ICD-10-CM

## 2021-05-04 DIAGNOSIS — R25.2 MUSCLE CRAMPING: ICD-10-CM

## 2021-05-04 DIAGNOSIS — M47.816 LUMBAR SPONDYLOSIS: ICD-10-CM

## 2021-05-04 DIAGNOSIS — F33.1 MODERATE EPISODE OF RECURRENT MAJOR DEPRESSIVE DISORDER (HCC): ICD-10-CM

## 2021-05-04 DIAGNOSIS — M51.37 DEGENERATION OF LUMBAR OR LUMBOSACRAL INTERVERTEBRAL DISC: ICD-10-CM

## 2021-05-04 DIAGNOSIS — G89.4 CHRONIC PAIN SYNDROME: ICD-10-CM

## 2021-05-04 DIAGNOSIS — K21.9 GASTROESOPHAGEAL REFLUX DISEASE WITHOUT ESOPHAGITIS: ICD-10-CM

## 2021-05-04 DIAGNOSIS — M79.7 FIBROMYALGIA: ICD-10-CM

## 2021-05-04 DIAGNOSIS — F51.01 PRIMARY INSOMNIA: ICD-10-CM

## 2021-05-04 PROCEDURE — 1090F PRES/ABSN URINE INCON ASSESS: CPT | Performed by: INTERNAL MEDICINE

## 2021-05-04 PROCEDURE — 4040F PNEUMOC VAC/ADMIN/RCVD: CPT | Performed by: INTERNAL MEDICINE

## 2021-05-04 PROCEDURE — 1123F ACP DISCUSS/DSCN MKR DOCD: CPT | Performed by: INTERNAL MEDICINE

## 2021-05-04 PROCEDURE — 99214 OFFICE O/P EST MOD 30 MIN: CPT | Performed by: INTERNAL MEDICINE

## 2021-05-04 PROCEDURE — G8427 DOCREV CUR MEDS BY ELIG CLIN: HCPCS | Performed by: INTERNAL MEDICINE

## 2021-05-04 PROCEDURE — G8399 PT W/DXA RESULTS DOCUMENT: HCPCS | Performed by: INTERNAL MEDICINE

## 2021-05-04 PROCEDURE — 3017F COLORECTAL CA SCREEN DOC REV: CPT | Performed by: INTERNAL MEDICINE

## 2021-05-04 RX ORDER — TRAZODONE HYDROCHLORIDE 50 MG/1
50-100 TABLET ORAL NIGHTLY
Qty: 60 TABLET | Refills: 1 | Status: SHIPPED | OUTPATIENT
Start: 2021-05-04 | End: 2021-06-01 | Stop reason: SDUPTHER

## 2021-05-04 RX ORDER — GABAPENTIN 600 MG/1
600 TABLET ORAL 2 TIMES DAILY
Qty: 60 TABLET | Refills: 1 | Status: SHIPPED | OUTPATIENT
Start: 2021-05-04 | End: 2021-06-01 | Stop reason: SDUPTHER

## 2021-05-04 RX ORDER — MELOXICAM 15 MG/1
TABLET ORAL
Qty: 30 TABLET | Refills: 1 | Status: SHIPPED | OUTPATIENT
Start: 2021-05-04 | End: 2021-06-29 | Stop reason: SDUPTHER

## 2021-05-04 RX ORDER — PANTOPRAZOLE SODIUM 40 MG/1
40 TABLET, DELAYED RELEASE ORAL DAILY
Qty: 30 TABLET | Refills: 1 | Status: SHIPPED | OUTPATIENT
Start: 2021-05-04 | End: 2021-06-01 | Stop reason: SDUPTHER

## 2021-05-04 RX ORDER — METHOCARBAMOL 500 MG/1
TABLET, FILM COATED ORAL
Qty: 60 TABLET | Refills: 1 | Status: SHIPPED | OUTPATIENT
Start: 2021-05-04 | End: 2021-06-01 | Stop reason: SDUPTHER

## 2021-05-04 RX ORDER — DULOXETIN HYDROCHLORIDE 60 MG/1
CAPSULE, DELAYED RELEASE ORAL
Qty: 30 CAPSULE | Refills: 1 | Status: SHIPPED | OUTPATIENT
Start: 2021-05-04 | End: 2021-06-01 | Stop reason: SDUPTHER

## 2021-05-04 RX ORDER — OXYCODONE AND ACETAMINOPHEN 7.5; 325 MG/1; MG/1
1 TABLET ORAL EVERY 6 HOURS PRN
Qty: 112 TABLET | Refills: 0 | Status: SHIPPED | OUTPATIENT
Start: 2021-05-04 | End: 2021-06-01 | Stop reason: SDUPTHER

## 2021-05-04 NOTE — PROGRESS NOTES
traZODone (DESYREL) 50 MG tablet Take 1-2 tablets by mouth nightly 60 tablet 1    DULoxetine (CYMBALTA) 60 MG extended release capsule Take one tablet at night 30 capsule 1    gabapentin (NEURONTIN) 600 MG tablet Take 1 tablet by mouth 2 times daily for 30 days. 60 tablet 1    atorvastatin (LIPITOR) 80 MG tablet TAKE 1 TABLET BY MOUTH EVERY DAY FOR CHOLESTEROL 90 tablet 1    albuterol sulfate  (90 Base) MCG/ACT inhaler Inhale 2 puffs into the lungs every 6 hours as needed for Shortness of Breath 1 Inhaler 11    Blood Glucose Monitoring Suppl (ONE TOUCH ULTRA MINI) w/Device KIT 1 kit by Does not apply route daily 1 kit 0    ONE TOUCH LANCETS MISC 1 each by Does not apply route daily 100 each 3    blood glucose test strips (ASCENSIA AUTODISC VI;ONE TOUCH ULTRA TEST VI) strip 1 each by In Vitro route daily 100 each 3    ipratropium-albuterol (DUONEB) 0.5-2.5 (3) MG/3ML SOLN nebulizer solution Take 1 aerosol every 4 hours for 2 days and then as needed for shortness of breath coughing and wheezing 360 mL 3    Cholecalciferol (VITAMIN D) 2000 units TABS tablet Take 1 tablet by mouth daily 30 tablet     cetirizine (ZYRTEC) 10 MG tablet Take 10 mg by mouth daily as needed for Allergies      Nebulizers (COMPRESSOR/NEBULIZER) MISC 1 Units by Does not apply route 4 times daily 1 each 3    varenicline (CHANTIX) 1 MG tablet Take 1 tablet by mouth 2 times daily (Patient not taking: Reported on 5/4/2021) 180 tablet 0     No facility-administered medications prior to visit. REVIEW OF SYSTEMS: .   Respiratory: Negative for shortness of breath. Cardiovascular: Negative for chest pain, palpitations  Gastrointestinal: Negative for blood in stool, abdominal distention, nausea, vomiting, abdominal pain, diarrhea,constipation.   Neurological: Negative for speech difficulty, weakness and light-headedness, dizziness, tremors, sleepiness  Psychiatric/Behavioral: Negative for suicidal ideas, hallucinations, behavioral problems, self-injury, decreased concentration/cognition, agitation, confusion. PHYSICAL EXAM:   Nursing note and vitals reviewed. /65   Pulse 74   Temp 97.5 °F (36.4 °C)   Wt 131 lb (59.4 kg)   BMI 22.49 kg/m²   General Appearance: Patient is well nourished, well developed, well groomed and in no acute distress. Skin: Skin is warm and dry, good turgor . No rash or lesions noted. She is not diaphoretic. Pulmonary/Chest: Effort normal. No respiratory distress or use of accessory muscles. Auscultation revealing normal air entry. She does not have wheezes in the lung fields. She has no rales. Cardiovascular: Normal rate, regular rhythm, normal heart sounds, and does not have murmur. Exam reveals no gallop and no friction rub. Musculoskeletal / Extremities: Range of motion is normal. Gait is normal, assistive devices use: none. She exhibits edema: none, and no tenderness. Neurological/Psychiatric:She is alert and oriented to person, place, and time. Coordination is  normal.   Judgement and Insight is normal  Her mood is Appropriate and affect is Anxious . Her behavior is normal.   thought content normal.        IMPRESSION:     1. Chronic pain syndrome - INADEQUATELY CONTROLLED: treatment plan adjusted as below    2. Muscle cramping - INADEQUATELY CONTROLLED: treatment plan adjusted as below    3. Fibromyalgia - INADEQUATELY CONTROLLED: treatment plan adjusted as below    4. Osteoarthritis of spine with radiculopathy, lumbar region - STABLE: Continue current treatment plan    5. DDD (degenerative disc disease), lumbar - STABLE: Continue current treatment plan    6. Lumbar spondylosis - STABLE: Continue current treatment plan    7. Degeneration of lumbar or lumbosacral intervertebral disc - STABLE: Continue current treatment plan    8. Moderate episode of recurrent major depressive disorder (HCC) - STABLE: Continue current treatment plan    9.  Primary insomnia - STABLE: Continue current treatment plan    10. Gastroesophageal reflux disease without esophagitis - STABLE: Continue current treatment plan    11. Trochanteric bursitis of right hip - STABLE: Continue current treatment plan          PLAN:  Informed verbal consent was obtained. -neck postural exercises given   -back stretching exercises as advised   -increase  Robaxin to 500 BID along with Mobic 15 mg daily PRN  -she was advised proper sleep hygiene-told to avoid:use of caffeine or other stimulants after noon, alcohol use near bedtime, long or frequent naps during the day, erratic sleep schedule, heavy meals near bedtime, vigorous exercise near bedtime and use of electronic devices near bedtime  Continue with Trazodone  -Advised caffeine reduction, dietary changes, elevate head end of bed, NPO after supper, if using alcohol advised reduction of alcohol intake, tobacco cessation if smoking, weight loss. Continue with Protonix  -continue with Cymbalta 60 mg   -continue with Neurontin 1200 mg daily  -Urine drug screen with GC/MS for opiates and drugs of abuse was ordered and will follow up on results.   -advised to walk 20-30 minutes daily as tolerated   -advised to do water exercises    Ms. Bronson Reddy will be prescribed  the medications  listed below which are for treatment of her presenting  medical problems which for this visit include:   Diagnoses of Chronic pain syndrome, Muscle cramping, Fibromyalgia, Osteoarthritis of spine with radiculopathy, lumbar region, DDD (degenerative disc disease), lumbar, Lumbar spondylosis, Degeneration of lumbar or lumbosacral intervertebral disc, Moderate episode of recurrent major depressive disorder ([address] Primary insomnia, and Gastroesophageal reflux disease without esophagitis were pertinent to this visit.   Medications/orders associated with this visit:    Current Outpatient Medications   Medication Sig Dispense Refill    Fluticasone furoate-vilanterol (BREO ELLIPTA) 200-25 MCG/INH AEPB inhaler Inhale 1 puff into the lungs daily 1 each 11    varenicline (CHANTIX) 0.5 MG tablet Take 1-2 tablets by mouth See Admin Instructions 0.5mg DAILY for 3 days followed by 0.5mg TWICE DAILY for 4 days followed by 1mg TWICE DAILY 57 tablet 0    methocarbamol (ROBAXIN) 500 MG tablet TAKE 1 TABLET BY MOUTH TWICE A DAY 30 tablet 1    pantoprazole (PROTONIX) 40 MG tablet Take 1 tablet by mouth daily 30 tablet 1    oxyCODONE-acetaminophen (PERCOCET) 7.5-325 MG per tablet Take 1 tablet by mouth every 6 hours as needed for Pain for up to 28 days. Max 4 a day 112 tablet 0    meloxicam (MOBIC) 15 MG tablet TAKE 1 TABLET BY MOUTH DAILY 30 tablet 1    traZODone (DESYREL) 50 MG tablet Take 1-2 tablets by mouth nightly 60 tablet 1    DULoxetine (CYMBALTA) 60 MG extended release capsule Take one tablet at night 30 capsule 1    gabapentin (NEURONTIN) 600 MG tablet Take 1 tablet by mouth 2 times daily for 30 days.  60 tablet 1    atorvastatin (LIPITOR) 80 MG tablet TAKE 1 TABLET BY MOUTH EVERY DAY FOR CHOLESTEROL 90 tablet 1    albuterol sulfate  (90 Base) MCG/ACT inhaler Inhale 2 puffs into the lungs every 6 hours as needed for Shortness of Breath 1 Inhaler 11    Blood Glucose Monitoring Suppl (ONE TOUCH ULTRA MINI) w/Device KIT 1 kit by Does not apply route daily 1 kit 0    ONE TOUCH LANCETS MISC 1 each by Does not apply route daily 100 each 3    blood glucose test strips (ASCENSIA AUTODISC VI;ONE TOUCH ULTRA TEST VI) strip 1 each by In Vitro route daily 100 each 3    ipratropium-albuterol (DUONEB) 0.5-2.5 (3) MG/3ML SOLN nebulizer solution Take 1 aerosol every 4 hours for 2 days and then as needed for shortness of breath coughing and wheezing 360 mL 3    Cholecalciferol (VITAMIN D) 2000 units TABS tablet Take 1 tablet by mouth daily 30 tablet     cetirizine (ZYRTEC) 10 MG tablet Take 10 mg by mouth daily as needed for Allergies      Nebulizers (COMPRESSOR/NEBULIZER) MISC 1 Units by Does not apply route 4 times daily 1 each 3    varenicline (CHANTIX) 1 MG tablet Take 1 tablet by mouth 2 times daily (Patient not taking: Reported on 5/4/2021) 180 tablet 0     No current facility-administered medications for this visit. Goals of current treatment regimen include improvement in pain, restoration of functioning- with focus on improvement in physical performance, general activity, work or disability,emotional distress, health care utilization and  decreased medication consumption. Will continue to monitor progress towards achieving/maintaining therapeutic goals with special emphasis on  1. Improvement in perceived interfernce  of pain with ADL's. Ability to do home exercises independently. Ability to do household chores indoor and/or outdoor work and social and leisure activities. To increase flexibility/ROM, strength and endurance. Improve psychosocial and physical functioning.- she is not showing any significant progress/or showing regression  towards this goal and reassessment and adjustment of goals/treatment have been made. 2. Improving sleep to 6-7 hours a night. Improve mood/ anxiety and depression symptoms such as crying spells, low energy, problems with concentration, motivation. - she is showing progression towards this treatment goal with the current regimen. 3. Reduction of reliance on opioid analgesia/more appropriate opioid use. - she is showing progression towards this treatment goal with the current regimen. Risks and benefits of the medications and other alternative treatments have been/were  discussed with the patient. Any questions on the  common side effects of these medications were also answered. She was advised against drinking alcohol with the narcotic pain medicines, advised against driving or handling machinery when  starting or adjusting the dose of medicines, feeling groggy or drowsy, or if having any cognitive issues related to the current medications.  Sheis fully aware of the risk of overdose and death, if medicines are misused and not taken as prescribed. If she develops new symptoms or if the symptoms worsen, she was told to call the office. .  Thank you for allowing me to participate in the care of this patient.     Krzysztof Holland MD    Cc: HECTOR Berrios - CNP

## 2021-05-17 ENCOUNTER — CLINICAL DOCUMENTATION (OUTPATIENT)
Dept: SPIRITUAL SERVICES | Age: 76
End: 2021-05-17

## 2021-06-01 ENCOUNTER — OFFICE VISIT (OUTPATIENT)
Dept: PAIN MANAGEMENT | Age: 76
End: 2021-06-01
Payer: MEDICARE

## 2021-06-01 VITALS
HEART RATE: 180 BPM | DIASTOLIC BLOOD PRESSURE: 61 MMHG | SYSTOLIC BLOOD PRESSURE: 100 MMHG | BODY MASS INDEX: 22.31 KG/M2 | TEMPERATURE: 97.7 F | WEIGHT: 130 LBS

## 2021-06-01 DIAGNOSIS — M47.816 LUMBAR SPONDYLOSIS: ICD-10-CM

## 2021-06-01 DIAGNOSIS — M51.36 DDD (DEGENERATIVE DISC DISEASE), LUMBAR: ICD-10-CM

## 2021-06-01 DIAGNOSIS — M51.37 DEGENERATION OF LUMBAR OR LUMBOSACRAL INTERVERTEBRAL DISC: ICD-10-CM

## 2021-06-01 DIAGNOSIS — R25.2 MUSCLE CRAMPING: ICD-10-CM

## 2021-06-01 DIAGNOSIS — G89.4 CHRONIC PAIN SYNDROME: ICD-10-CM

## 2021-06-01 DIAGNOSIS — M47.26 OSTEOARTHRITIS OF SPINE WITH RADICULOPATHY, LUMBAR REGION: ICD-10-CM

## 2021-06-01 DIAGNOSIS — M70.61 TROCHANTERIC BURSITIS OF RIGHT HIP: ICD-10-CM

## 2021-06-01 DIAGNOSIS — K21.9 GASTROESOPHAGEAL REFLUX DISEASE WITHOUT ESOPHAGITIS: ICD-10-CM

## 2021-06-01 DIAGNOSIS — M79.7 FIBROMYALGIA: ICD-10-CM

## 2021-06-01 DIAGNOSIS — F51.01 PRIMARY INSOMNIA: ICD-10-CM

## 2021-06-01 PROCEDURE — 4040F PNEUMOC VAC/ADMIN/RCVD: CPT | Performed by: INTERNAL MEDICINE

## 2021-06-01 PROCEDURE — 1123F ACP DISCUSS/DSCN MKR DOCD: CPT | Performed by: INTERNAL MEDICINE

## 2021-06-01 PROCEDURE — 3017F COLORECTAL CA SCREEN DOC REV: CPT | Performed by: INTERNAL MEDICINE

## 2021-06-01 PROCEDURE — 99213 OFFICE O/P EST LOW 20 MIN: CPT | Performed by: INTERNAL MEDICINE

## 2021-06-01 PROCEDURE — 1090F PRES/ABSN URINE INCON ASSESS: CPT | Performed by: INTERNAL MEDICINE

## 2021-06-01 PROCEDURE — G8427 DOCREV CUR MEDS BY ELIG CLIN: HCPCS | Performed by: INTERNAL MEDICINE

## 2021-06-01 PROCEDURE — G8399 PT W/DXA RESULTS DOCUMENT: HCPCS | Performed by: INTERNAL MEDICINE

## 2021-06-01 RX ORDER — GABAPENTIN 600 MG/1
600 TABLET ORAL 2 TIMES DAILY
Qty: 60 TABLET | Refills: 1 | Status: SHIPPED | OUTPATIENT
Start: 2021-06-01 | End: 2021-06-29 | Stop reason: SDUPTHER

## 2021-06-01 RX ORDER — OXYCODONE AND ACETAMINOPHEN 7.5; 325 MG/1; MG/1
1 TABLET ORAL EVERY 6 HOURS PRN
Qty: 112 TABLET | Refills: 0 | Status: SHIPPED | OUTPATIENT
Start: 2021-06-01 | End: 2021-06-29 | Stop reason: SDUPTHER

## 2021-06-01 RX ORDER — DULOXETIN HYDROCHLORIDE 60 MG/1
CAPSULE, DELAYED RELEASE ORAL
Qty: 30 CAPSULE | Refills: 1 | Status: SHIPPED | OUTPATIENT
Start: 2021-06-01 | End: 2021-06-29 | Stop reason: SDUPTHER

## 2021-06-01 RX ORDER — PANTOPRAZOLE SODIUM 40 MG/1
40 TABLET, DELAYED RELEASE ORAL DAILY
Qty: 30 TABLET | Refills: 1 | Status: SHIPPED | OUTPATIENT
Start: 2021-06-01 | End: 2021-06-29 | Stop reason: SDUPTHER

## 2021-06-01 RX ORDER — METHOCARBAMOL 500 MG/1
TABLET, FILM COATED ORAL
Qty: 60 TABLET | Refills: 1 | Status: SHIPPED | OUTPATIENT
Start: 2021-06-01 | End: 2021-06-29 | Stop reason: SDUPTHER

## 2021-06-01 RX ORDER — TRAZODONE HYDROCHLORIDE 50 MG/1
50-100 TABLET ORAL NIGHTLY
Qty: 60 TABLET | Refills: 1 | Status: SHIPPED | OUTPATIENT
Start: 2021-06-01 | End: 2021-06-29 | Stop reason: SDUPTHER

## 2021-06-01 RX ORDER — OXYCODONE AND ACETAMINOPHEN 7.5; 325 MG/1; MG/1
1 TABLET ORAL EVERY 6 HOURS PRN
Qty: 112 TABLET | Refills: 0 | Status: SHIPPED | OUTPATIENT
Start: 2021-06-01 | End: 2021-06-01 | Stop reason: CLARIF

## 2021-06-01 NOTE — PROGRESS NOTES
David العلي  1945  [id number]      HISTORY OF PRESENT ILLNESS:  Ms. Anai Castillo is a 70 y.o. female returns for a follow up visit for pain management  She has a diagnosis of   1. Chronic pain syndrome    2. Gastroesophageal reflux disease without esophagitis    3. Muscle cramping    4. Fibromyalgia    5. Osteoarthritis of spine with radiculopathy, lumbar region    6. DDD (degenerative disc disease), lumbar    7. Lumbar spondylosis    . She complains of pain in the lower back She rates the pain 8/10 and describes it as aching, burning. Current treatment regimen has helped relieve about 30% of the pain. She denies any side effects from the current pain regimen. Patient reports that since the last follow up visit the physical functioning is unchanged, family/social relationships are unchanged, mood is unchanged sleep patterns are unchanged, and that the overall functioning is worse. Patient denies misusing/abusing her narcotic pain medications or using any illegal drugs. There are No indicators for possible drug abuse, addiction or diversion problems. Patient complains she has been hurting more and is having pain in the center of her back, but not much in the buttock. She says she is using Percocet 4 per day along with the other adjuvants. She reports she start Chantix again to help with quit smoking. ALLERGIES: Patients list of allergies were reviewed     MEDICATIONS: Ms. Anai Castillo list of medications were reviewed. Her current medications are   Outpatient Medications Prior to Visit   Medication Sig Dispense Refill    methocarbamol (ROBAXIN) 500 MG tablet TAKE 1 TABLET BY MOUTH TWICE A DAY 60 tablet 1    pantoprazole (PROTONIX) 40 MG tablet Take 1 tablet by mouth daily 30 tablet 1    oxyCODONE-acetaminophen (PERCOCET) 7.5-325 MG per tablet Take 1 tablet by mouth every 6 hours as needed for Pain for up to 28 days.  Max 4 a day 112 tablet 0    meloxicam (MOBIC) 15 MG tablet TAKE 1 TABLET BY MOUTH DAILY 30 tablet 1    traZODone (DESYREL) 50 MG tablet Take 1-2 tablets by mouth nightly 60 tablet 1    DULoxetine (CYMBALTA) 60 MG extended release capsule Take one tablet at night 30 capsule 1    gabapentin (NEURONTIN) 600 MG tablet Take 1 tablet by mouth 2 times daily for 30 days. 60 tablet 1    Fluticasone furoate-vilanterol (BREO ELLIPTA) 200-25 MCG/INH AEPB inhaler Inhale 1 puff into the lungs daily 1 each 11    varenicline (CHANTIX) 0.5 MG tablet Take 1-2 tablets by mouth See Admin Instructions 0.5mg DAILY for 3 days followed by 0.5mg TWICE DAILY for 4 days followed by 1mg TWICE DAILY 57 tablet 0    varenicline (CHANTIX) 1 MG tablet Take 1 tablet by mouth 2 times daily 180 tablet 0    atorvastatin (LIPITOR) 80 MG tablet TAKE 1 TABLET BY MOUTH EVERY DAY FOR CHOLESTEROL 90 tablet 1    albuterol sulfate  (90 Base) MCG/ACT inhaler Inhale 2 puffs into the lungs every 6 hours as needed for Shortness of Breath 1 Inhaler 11    Blood Glucose Monitoring Suppl (ONE TOUCH ULTRA MINI) w/Device KIT 1 kit by Does not apply route daily 1 kit 0    ONE TOUCH LANCETS MISC 1 each by Does not apply route daily 100 each 3    blood glucose test strips (ASCENSIA AUTODISC VI;ONE TOUCH ULTRA TEST VI) strip 1 each by In Vitro route daily 100 each 3    ipratropium-albuterol (DUONEB) 0.5-2.5 (3) MG/3ML SOLN nebulizer solution Take 1 aerosol every 4 hours for 2 days and then as needed for shortness of breath coughing and wheezing 360 mL 3    Cholecalciferol (VITAMIN D) 2000 units TABS tablet Take 1 tablet by mouth daily 30 tablet     cetirizine (ZYRTEC) 10 MG tablet Take 10 mg by mouth daily as needed for Allergies      Nebulizers (COMPRESSOR/NEBULIZER) MISC 1 Units by Does not apply route 4 times daily 1 each 3     No facility-administered medications prior to visit. REVIEW OF SYSTEMS:    Respiratory: Negative for apnea, chest tightness and shortness of breath or change in baseline breathing.       PHYSICAL EXAM: DAILY 30 tablet 1    traZODone (DESYREL) 50 MG tablet Take 1-2 tablets by mouth nightly 60 tablet 1    DULoxetine (CYMBALTA) 60 MG extended release capsule Take one tablet at night 30 capsule 1    gabapentin (NEURONTIN) 600 MG tablet Take 1 tablet by mouth 2 times daily for 30 days. 60 tablet 1    Fluticasone furoate-vilanterol (BREO ELLIPTA) 200-25 MCG/INH AEPB inhaler Inhale 1 puff into the lungs daily 1 each 11    varenicline (CHANTIX) 0.5 MG tablet Take 1-2 tablets by mouth See Admin Instructions 0.5mg DAILY for 3 days followed by 0.5mg TWICE DAILY for 4 days followed by 1mg TWICE DAILY 57 tablet 0    varenicline (CHANTIX) 1 MG tablet Take 1 tablet by mouth 2 times daily 180 tablet 0    atorvastatin (LIPITOR) 80 MG tablet TAKE 1 TABLET BY MOUTH EVERY DAY FOR CHOLESTEROL 90 tablet 1    albuterol sulfate  (90 Base) MCG/ACT inhaler Inhale 2 puffs into the lungs every 6 hours as needed for Shortness of Breath 1 Inhaler 11    Blood Glucose Monitoring Suppl (ONE TOUCH ULTRA MINI) w/Device KIT 1 kit by Does not apply route daily 1 kit 0    ONE TOUCH LANCETS MISC 1 each by Does not apply route daily 100 each 3    blood glucose test strips (ASCENSIA AUTODISC VI;ONE TOUCH ULTRA TEST VI) strip 1 each by In Vitro route daily 100 each 3    ipratropium-albuterol (DUONEB) 0.5-2.5 (3) MG/3ML SOLN nebulizer solution Take 1 aerosol every 4 hours for 2 days and then as needed for shortness of breath coughing and wheezing 360 mL 3    Cholecalciferol (VITAMIN D) 2000 units TABS tablet Take 1 tablet by mouth daily 30 tablet     cetirizine (ZYRTEC) 10 MG tablet Take 10 mg by mouth daily as needed for Allergies      Nebulizers (COMPRESSOR/NEBULIZER) MISC 1 Units by Does not apply route 4 times daily 1 each 3     No current facility-administered medications for this visit. I will continue her current medication regimen  which is part of the above treatment schedule.  It has been helping with Ms. Jemima Uyen acosta Leonel Bustamante, APRN - CNP

## 2021-06-28 ENCOUNTER — OFFICE VISIT (OUTPATIENT)
Dept: PULMONOLOGY | Age: 76
End: 2021-06-28
Payer: MEDICARE

## 2021-06-28 VITALS
DIASTOLIC BLOOD PRESSURE: 72 MMHG | WEIGHT: 133 LBS | TEMPERATURE: 97 F | OXYGEN SATURATION: 95 % | BODY MASS INDEX: 22.71 KG/M2 | RESPIRATION RATE: 14 BRPM | HEART RATE: 82 BPM | SYSTOLIC BLOOD PRESSURE: 134 MMHG | HEIGHT: 64 IN

## 2021-06-28 DIAGNOSIS — J44.9 COPD, MODERATE (HCC): ICD-10-CM

## 2021-06-28 DIAGNOSIS — Z72.0 TOBACCO ABUSE: Primary | ICD-10-CM

## 2021-06-28 PROCEDURE — 4004F PT TOBACCO SCREEN RCVD TLK: CPT | Performed by: INTERNAL MEDICINE

## 2021-06-28 PROCEDURE — 3023F SPIROM DOC REV: CPT | Performed by: INTERNAL MEDICINE

## 2021-06-28 PROCEDURE — 1090F PRES/ABSN URINE INCON ASSESS: CPT | Performed by: INTERNAL MEDICINE

## 2021-06-28 PROCEDURE — 3017F COLORECTAL CA SCREEN DOC REV: CPT | Performed by: INTERNAL MEDICINE

## 2021-06-28 PROCEDURE — G8420 CALC BMI NORM PARAMETERS: HCPCS | Performed by: INTERNAL MEDICINE

## 2021-06-28 PROCEDURE — 1123F ACP DISCUSS/DSCN MKR DOCD: CPT | Performed by: INTERNAL MEDICINE

## 2021-06-28 PROCEDURE — 99214 OFFICE O/P EST MOD 30 MIN: CPT | Performed by: INTERNAL MEDICINE

## 2021-06-28 PROCEDURE — G8399 PT W/DXA RESULTS DOCUMENT: HCPCS | Performed by: INTERNAL MEDICINE

## 2021-06-28 PROCEDURE — 4040F PNEUMOC VAC/ADMIN/RCVD: CPT | Performed by: INTERNAL MEDICINE

## 2021-06-28 PROCEDURE — G8926 SPIRO NO PERF OR DOC: HCPCS | Performed by: INTERNAL MEDICINE

## 2021-06-28 PROCEDURE — G8427 DOCREV CUR MEDS BY ELIG CLIN: HCPCS | Performed by: INTERNAL MEDICINE

## 2021-06-28 RX ORDER — PREDNISONE 20 MG/1
40 TABLET ORAL DAILY
Qty: 10 TABLET | Refills: 0 | Status: SHIPPED | OUTPATIENT
Start: 2021-06-28 | End: 2021-07-03

## 2021-06-28 ASSESSMENT — COPD QUESTIONNAIRES
QUESTION8_ENERGYLEVEL: 3
QUESTION5_HOMEACTIVITIES: 2
QUESTION3_CHESTTIGHTNESS: 1
QUESTION2_CHESTPHLEGM: 2
CAT_TOTALSCORE: 15
QUESTION1_COUGHFREQUENCY: 2
QUESTION6_LEAVINGHOUSE: 1
QUESTION4_WALKINCLINE: 3
QUESTION7_SLEEPQUALITY: 1

## 2021-06-28 NOTE — ASSESSMENT & PLAN NOTE
Continues to smoke. Stopped Chantix. Back to smoking 1.5 ppd. Not ready to quit at this point. lung cancer screening Jan 2022.

## 2021-06-29 ENCOUNTER — OFFICE VISIT (OUTPATIENT)
Dept: PAIN MANAGEMENT | Age: 76
End: 2021-06-29
Payer: MEDICARE

## 2021-06-29 VITALS
SYSTOLIC BLOOD PRESSURE: 166 MMHG | TEMPERATURE: 98.1 F | DIASTOLIC BLOOD PRESSURE: 77 MMHG | WEIGHT: 132 LBS | BODY MASS INDEX: 22.66 KG/M2 | HEART RATE: 69 BPM

## 2021-06-29 DIAGNOSIS — M51.37 DEGENERATION OF LUMBAR OR LUMBOSACRAL INTERVERTEBRAL DISC: ICD-10-CM

## 2021-06-29 DIAGNOSIS — M79.7 FIBROMYALGIA: ICD-10-CM

## 2021-06-29 DIAGNOSIS — R25.2 MUSCLE CRAMPING: ICD-10-CM

## 2021-06-29 DIAGNOSIS — M51.36 DDD (DEGENERATIVE DISC DISEASE), LUMBAR: ICD-10-CM

## 2021-06-29 DIAGNOSIS — M47.816 LUMBAR SPONDYLOSIS: ICD-10-CM

## 2021-06-29 DIAGNOSIS — M47.26 OSTEOARTHRITIS OF SPINE WITH RADICULOPATHY, LUMBAR REGION: ICD-10-CM

## 2021-06-29 DIAGNOSIS — G89.4 CHRONIC PAIN SYNDROME: ICD-10-CM

## 2021-06-29 DIAGNOSIS — M70.61 TROCHANTERIC BURSITIS OF RIGHT HIP: ICD-10-CM

## 2021-06-29 PROCEDURE — G8399 PT W/DXA RESULTS DOCUMENT: HCPCS | Performed by: INTERNAL MEDICINE

## 2021-06-29 PROCEDURE — 4040F PNEUMOC VAC/ADMIN/RCVD: CPT | Performed by: INTERNAL MEDICINE

## 2021-06-29 PROCEDURE — 3017F COLORECTAL CA SCREEN DOC REV: CPT | Performed by: INTERNAL MEDICINE

## 2021-06-29 PROCEDURE — 1123F ACP DISCUSS/DSCN MKR DOCD: CPT | Performed by: INTERNAL MEDICINE

## 2021-06-29 PROCEDURE — 1090F PRES/ABSN URINE INCON ASSESS: CPT | Performed by: INTERNAL MEDICINE

## 2021-06-29 PROCEDURE — G8427 DOCREV CUR MEDS BY ELIG CLIN: HCPCS | Performed by: INTERNAL MEDICINE

## 2021-06-29 PROCEDURE — 99213 OFFICE O/P EST LOW 20 MIN: CPT | Performed by: INTERNAL MEDICINE

## 2021-06-29 RX ORDER — MELOXICAM 15 MG/1
TABLET ORAL
Qty: 30 TABLET | Refills: 1 | Status: SHIPPED | OUTPATIENT
Start: 2021-06-29 | End: 2021-10-26 | Stop reason: SDUPTHER

## 2021-06-29 RX ORDER — DULOXETIN HYDROCHLORIDE 60 MG/1
CAPSULE, DELAYED RELEASE ORAL
Qty: 30 CAPSULE | Refills: 1 | Status: SHIPPED | OUTPATIENT
Start: 2021-06-29 | End: 2021-07-27 | Stop reason: SDUPTHER

## 2021-06-29 RX ORDER — METHOCARBAMOL 500 MG/1
TABLET, FILM COATED ORAL
Qty: 60 TABLET | Refills: 1 | Status: SHIPPED | OUTPATIENT
Start: 2021-06-29 | End: 2021-08-31 | Stop reason: ALTCHOICE

## 2021-06-29 RX ORDER — GABAPENTIN 600 MG/1
600 TABLET ORAL 2 TIMES DAILY
Qty: 60 TABLET | Refills: 1 | Status: SHIPPED | OUTPATIENT
Start: 2021-06-29 | End: 2021-07-27 | Stop reason: SDUPTHER

## 2021-06-29 RX ORDER — OXYCODONE AND ACETAMINOPHEN 7.5; 325 MG/1; MG/1
1 TABLET ORAL EVERY 6 HOURS PRN
Qty: 112 TABLET | Refills: 0 | Status: SHIPPED | OUTPATIENT
Start: 2021-06-29 | End: 2021-07-27 | Stop reason: SDUPTHER

## 2021-06-29 RX ORDER — TRAZODONE HYDROCHLORIDE 50 MG/1
50-100 TABLET ORAL NIGHTLY
Qty: 60 TABLET | Refills: 1 | Status: SHIPPED | OUTPATIENT
Start: 2021-06-29 | End: 2021-10-26 | Stop reason: SDUPTHER

## 2021-06-29 RX ORDER — PANTOPRAZOLE SODIUM 40 MG/1
40 TABLET, DELAYED RELEASE ORAL DAILY
Qty: 30 TABLET | Refills: 1 | Status: SHIPPED | OUTPATIENT
Start: 2021-06-29 | End: 2021-08-31 | Stop reason: SDUPTHER

## 2021-06-29 NOTE — PROGRESS NOTES
Shayne Mtz  1945  [id number]      HISTORY OF PRESENT ILLNESS:  Ms. Joshua Burnett is a 70 y.o. female returns for a follow up visit for pain management  She has a diagnosis of   1. Chronic pain syndrome    2. Muscle cramping    3. Fibromyalgia    4. Osteoarthritis of spine with radiculopathy, lumbar region    5. DDD (degenerative disc disease), lumbar    6. Lumbar spondylosis    7. Degeneration of lumbar or lumbosacral intervertebral disc    8. Trochanteric bursitis of right hip    9. Gastroesophageal reflux disease without esophagitis    10. Primary insomnia    11. Moderate episode of recurrent major depressive disorder ([facility] [address]    . She complains of pain in the Low back  She rates the pain 6/10 and describes it as aching. Current treatment regimen has helped relieve about 40% of the pain. She denies any side effects from the current pain regimen. Patient reports that since the last follow up visit the physical functioning is unchanged, family/social relationships are unchanged, mood is unchanged sleep patterns are unchanged, and that the overall functioning is unchanged. Patient denies misusing/abusing her narcotic pain medications or using any illegal drugs. There are No indicators for possible drug abuse, addiction or diversion problems. Patient states she has been doing fair, managing okay with the medications. She says she is using Percocet 4 per day. She denies any constipation symptoms. She reports she has been using all the adjuvants. She complains the pain is greater in the back than the legs. She says she is using Mobic along with Robaxin as needed. She denies any constipation symptoms. ALLERGIES: Patients list of allergies were reviewed     MEDICATIONS: Ms. Joshua Burnett list of medications were reviewed. Her current medications are   Outpatient Medications Prior to Visit   Medication Sig Dispense Refill    predniSONE (DELTASONE) 20 MG tablet Take 2 tablets by mouth daily for 5 days 10 tablet 0    Fluticasone furoate-vilanterol (BREO ELLIPTA) 200-25 MCG/INH AEPB inhaler Inhale 1 puff into the lungs daily 1 each 11    varenicline (CHANTIX) 0.5 MG tablet Take 1-2 tablets by mouth See Admin Instructions 0.5mg DAILY for 3 days followed by 0.5mg TWICE DAILY for 4 days followed by 1mg TWICE DAILY 57 tablet 0    varenicline (CHANTIX) 1 MG tablet Take 1 tablet by mouth 2 times daily 180 tablet 0    atorvastatin (LIPITOR) 80 MG tablet TAKE 1 TABLET BY MOUTH EVERY DAY FOR CHOLESTEROL 90 tablet 1    albuterol sulfate  (90 Base) MCG/ACT inhaler Inhale 2 puffs into the lungs every 6 hours as needed for Shortness of Breath 1 Inhaler 11    Blood Glucose Monitoring Suppl (ONE TOUCH ULTRA MINI) w/Device KIT 1 kit by Does not apply route daily 1 kit 0    ONE TOUCH LANCETS MISC 1 each by Does not apply route daily 100 each 3    blood glucose test strips (ASCENSIA AUTODISC VI;ONE TOUCH ULTRA TEST VI) strip 1 each by In Vitro route daily 100 each 3    ipratropium-albuterol (DUONEB) 0.5-2.5 (3) MG/3ML SOLN nebulizer solution Take 1 aerosol every 4 hours for 2 days and then as needed for shortness of breath coughing and wheezing 360 mL 3    Cholecalciferol (VITAMIN D) 2000 units TABS tablet Take 1 tablet by mouth daily 30 tablet     cetirizine (ZYRTEC) 10 MG tablet Take 10 mg by mouth daily as needed for Allergies      Nebulizers (COMPRESSOR/NEBULIZER) MISC 1 Units by Does not apply route 4 times daily 1 each 3    methocarbamol (ROBAXIN) 500 MG tablet TAKE 1 TABLET BY MOUTH TWICE A DAY 60 tablet 1    pantoprazole (PROTONIX) 40 MG tablet Take 1 tablet by mouth daily 30 tablet 1    traZODone (DESYREL) 50 MG tablet Take 1-2 tablets by mouth nightly 60 tablet 1    DULoxetine (CYMBALTA) 60 MG extended release capsule Take one tablet at night 30 capsule 1    gabapentin (NEURONTIN) 600 MG tablet Take 1 tablet by mouth 2 times daily for 30 days.  60 tablet 1    oxyCODONE-acetaminophen (PERCOCET) 7.5-325 MG per tablet hypnotherapy, counselling  and biofeedback. These were discussed with patient. Current Outpatient Medications   Medication Sig Dispense Refill    methocarbamol (ROBAXIN) 500 MG tablet TAKE 1 TABLET BY MOUTH TWICE A DAY 60 tablet 1    pantoprazole (PROTONIX) 40 MG tablet Take 1 tablet by mouth daily 30 tablet 1    traZODone (DESYREL) 50 MG tablet Take 1-2 tablets by mouth nightly 60 tablet 1    DULoxetine (CYMBALTA) 60 MG extended release capsule Take one tablet at night 30 capsule 1    gabapentin (NEURONTIN) 600 MG tablet Take 1 tablet by mouth 2 times daily for 30 days. 60 tablet 1    oxyCODONE-acetaminophen (PERCOCET) 7.5-325 MG per tablet Take 1 tablet by mouth every 6 hours as needed for Pain for up to 28 days.  Max 4 a day 112 tablet 0    meloxicam (MOBIC) 15 MG tablet TAKE 1 TABLET BY MOUTH DAILY 30 tablet 1    predniSONE (DELTASONE) 20 MG tablet Take 2 tablets by mouth daily for 5 days 10 tablet 0    Fluticasone furoate-vilanterol (BREO ELLIPTA) 200-25 MCG/INH AEPB inhaler Inhale 1 puff into the lungs daily 1 each 11    varenicline (CHANTIX) 0.5 MG tablet Take 1-2 tablets by mouth See Admin Instructions 0.5mg DAILY for 3 days followed by 0.5mg TWICE DAILY for 4 days followed by 1mg TWICE DAILY 57 tablet 0    varenicline (CHANTIX) 1 MG tablet Take 1 tablet by mouth 2 times daily 180 tablet 0    atorvastatin (LIPITOR) 80 MG tablet TAKE 1 TABLET BY MOUTH EVERY DAY FOR CHOLESTEROL 90 tablet 1    albuterol sulfate  (90 Base) MCG/ACT inhaler Inhale 2 puffs into the lungs every 6 hours as needed for Shortness of Breath 1 Inhaler 11    Blood Glucose Monitoring Suppl (ONE TOUCH ULTRA MINI) w/Device KIT 1 kit by Does not apply route daily 1 kit 0    ONE TOUCH LANCETS MISC 1 each by Does not apply route daily 100 each 3    blood glucose test strips (ASCENSIA AUTODISC VI;ONE TOUCH ULTRA TEST VI) strip 1 each by In Vitro route daily 100 each 3    ipratropium-albuterol (DUONEB) 0.5-2.5 (3) MG/3ML SOLN nebulizer solution Take 1 aerosol every 4 hours for 2 days and then as needed for shortness of breath coughing and wheezing 360 mL 3    Cholecalciferol (VITAMIN D) 2000 units TABS tablet Take 1 tablet by mouth daily 30 tablet     cetirizine (ZYRTEC) 10 MG tablet Take 10 mg by mouth daily as needed for Allergies      Nebulizers (COMPRESSOR/NEBULIZER) MISC 1 Units by Does not apply route 4 times daily 1 each 3     No current facility-administered medications for this visit. I will continue her current medication regimen  which is part of the above treatment schedule. It has been helping with Ms. Hanna Steward chronic  medical problems which for this visit include:   Diagnoses of Chronic pain syndrome, Muscle cramping, Fibromyalgia, Osteoarthritis of spine with radiculopathy, lumbar region, DDD (degenerative disc disease), lumbar, Lumbar spondylosis, Degeneration of lumbar or lumbosacral intervertebral disc, Trochanteric bursitis of right hip, Gastroesophageal reflux disease without esophagitis, Primary insomnia, and Moderate episode of recurrent major depressive disorder ([address] were pertinent to this visit. Risks and benefits of the medications and other alternative treatments  including no treatment were discussed with the patient. The common side effects of these medications were also explained to the patient. Informed verbal consent was obtained. Goals of current treatment regimen include improvement in pain, restoration of functioning- with focus on improvement in physical performance, general activity, work or disability,emotional distress, health care utilization and  decreased medication consumption. Will continue to monitor progress towards achieving/maintaining therapeutic goals with special emphasis on  1. Improvement in perceived interfernce  of pain with ADL's. Ability to do home exercises independently.  Ability to do household chores indoor and/or outdoor work and social and leisure activities. Improve psychosocial and physical functioning.- she is showing progression towards this treatment goal with the current regimen. She was advised against drinking alcohol with the narcotic pain medicines, advised against driving or handling machinery while adjusting the dose of medicines or if having cognitive  issues related to the current medications. Risk of overdose and death, if medicines not taken as prescribed, were also discussed. If the patient develops new symptoms or if the symptoms worsen, the patient should call the office. While transcribing every attempt was made to maintain the accuracy of the note in terms of it's contents,there may have been some errors made inadvertently. Thank you for allowing me to participate in the care of this patient.     Otto Humphrey MD.    Cc: EHCTOR Fernandez - CNP

## 2021-06-30 ENCOUNTER — CLINICAL DOCUMENTATION (OUTPATIENT)
Dept: SPIRITUAL SERVICES | Age: 76
End: 2021-06-30

## 2021-07-08 DIAGNOSIS — Z72.0 TOBACCO ABUSE: ICD-10-CM

## 2021-07-08 RX ORDER — VARENICLINE TARTRATE 1 MG/1
1 TABLET, FILM COATED ORAL 2 TIMES DAILY
Qty: 168 TABLET | OUTPATIENT
Start: 2021-07-08

## 2021-07-21 ENCOUNTER — CLINICAL DOCUMENTATION (OUTPATIENT)
Dept: SPIRITUAL SERVICES | Age: 76
End: 2021-07-21

## 2021-07-21 NOTE — ACP (ADVANCE CARE PLANNING)
Advance Care Planning    Ambulatory ACP Specialist Patient Outreach    Date:  7/21/2021  ACP Specialist:  [address] [place] call to patient in follow-up to ACP Specialist referral from: HECTOR Jacobsen CNP    [x] PCP  [] Provider   [] Ambulatory Care Management [] Other for Reason:    [x] Advance Directive Assistance  [] Code Status Discussion  [] Complete Portable DNR Order  [] Discuss Goals of Care  [] Complete POST/MOST  [x] Early ACP Decision-Making  [] Other    Date Referral Received:  6/30/2021Today's Outreach:  [] First   [] Second  [x] Third                               Third outreach made by [x]  phone  [] email []   Consumr     Intervention:  [x] Spoke with Patient  [] Left VM requesting return call      Outcome:  Patient asked that I mail her the documents. She's not sure what she wants to do. She stated she will call me if she decides to complete them. Next Step:   [] ACP scheduled conversation  [] Outreach again in one week               [] Email / Mail ACP Info Sheets  [x] Email / Mail Advance Directive            [x] Close Referral. Routing closure to referring provider/staff and to ACP Specialist .      Thank you for this referral.

## 2021-07-27 ENCOUNTER — OFFICE VISIT (OUTPATIENT)
Dept: PAIN MANAGEMENT | Age: 76
End: 2021-07-27
Payer: MEDICARE

## 2021-07-27 VITALS
SYSTOLIC BLOOD PRESSURE: 122 MMHG | BODY MASS INDEX: 22.66 KG/M2 | DIASTOLIC BLOOD PRESSURE: 72 MMHG | HEART RATE: 101 BPM | WEIGHT: 132 LBS

## 2021-07-27 DIAGNOSIS — M51.37 DEGENERATION OF LUMBAR OR LUMBOSACRAL INTERVERTEBRAL DISC: ICD-10-CM

## 2021-07-27 DIAGNOSIS — R25.2 MUSCLE CRAMPING: ICD-10-CM

## 2021-07-27 DIAGNOSIS — M47.26 OSTEOARTHRITIS OF SPINE WITH RADICULOPATHY, LUMBAR REGION: ICD-10-CM

## 2021-07-27 DIAGNOSIS — G89.4 CHRONIC PAIN SYNDROME: ICD-10-CM

## 2021-07-27 DIAGNOSIS — M79.7 FIBROMYALGIA: ICD-10-CM

## 2021-07-27 DIAGNOSIS — M47.816 LUMBAR SPONDYLOSIS: ICD-10-CM

## 2021-07-27 DIAGNOSIS — M70.61 TROCHANTERIC BURSITIS OF RIGHT HIP: ICD-10-CM

## 2021-07-27 DIAGNOSIS — M51.36 DDD (DEGENERATIVE DISC DISEASE), LUMBAR: ICD-10-CM

## 2021-07-27 PROCEDURE — 3017F COLORECTAL CA SCREEN DOC REV: CPT | Performed by: INTERNAL MEDICINE

## 2021-07-27 PROCEDURE — 1123F ACP DISCUSS/DSCN MKR DOCD: CPT | Performed by: INTERNAL MEDICINE

## 2021-07-27 PROCEDURE — 99213 OFFICE O/P EST LOW 20 MIN: CPT | Performed by: INTERNAL MEDICINE

## 2021-07-27 PROCEDURE — 1090F PRES/ABSN URINE INCON ASSESS: CPT | Performed by: INTERNAL MEDICINE

## 2021-07-27 PROCEDURE — G8427 DOCREV CUR MEDS BY ELIG CLIN: HCPCS | Performed by: INTERNAL MEDICINE

## 2021-07-27 PROCEDURE — G8399 PT W/DXA RESULTS DOCUMENT: HCPCS | Performed by: INTERNAL MEDICINE

## 2021-07-27 PROCEDURE — 4040F PNEUMOC VAC/ADMIN/RCVD: CPT | Performed by: INTERNAL MEDICINE

## 2021-07-27 RX ORDER — DULOXETIN HYDROCHLORIDE 60 MG/1
60 CAPSULE, DELAYED RELEASE ORAL NIGHTLY
Qty: 30 CAPSULE | Refills: 1 | Status: SHIPPED | OUTPATIENT
Start: 2021-07-27 | End: 2021-08-31 | Stop reason: SDUPTHER

## 2021-07-27 RX ORDER — OXYCODONE AND ACETAMINOPHEN 7.5; 325 MG/1; MG/1
1 TABLET ORAL EVERY 6 HOURS PRN
Qty: 140 TABLET | Refills: 0 | Status: SHIPPED | OUTPATIENT
Start: 2021-07-27 | End: 2021-08-31 | Stop reason: SDUPTHER

## 2021-07-27 RX ORDER — GABAPENTIN 600 MG/1
600 TABLET ORAL 2 TIMES DAILY
Qty: 60 TABLET | Refills: 1 | Status: SHIPPED | OUTPATIENT
Start: 2021-07-27 | End: 2021-08-31 | Stop reason: SDUPTHER

## 2021-07-27 NOTE — PROGRESS NOTES
Madelyn Hull  1945  [id number]      HISTORY OF PRESENT ILLNESS:  Ms. Imani Alexis is a 70 y.o. female returns for a follow up visit for pain management  She has a diagnosis of   1. Chronic pain syndrome    2. Muscle cramping    3. Osteoarthritis of spine with radiculopathy, lumbar region    4. Fibromyalgia    5. DDD (degenerative disc disease), lumbar    6. Lumbar spondylosis    7. Degeneration of lumbar or lumbosacral intervertebral disc    8. Trochanteric bursitis of right hip    9. Gastroesophageal reflux disease without esophagitis    10. Primary insomnia    11. Moderate episode of recurrent major depressive disorder ([facility] [address]    . She complains of pain in the Low back  She rates the pain 7/10 and describes it as aching. Current treatment regimen has helped relieve about 30% of the pain. She denies any side effects from the current pain regimen. Patient reports that since the last follow up visit the physical functioning is unchanged, family/social relationships are unchanged, mood is unchanged sleep patterns are unchanged, and that the overall functioning is unchanged. Patient denies misusing/abusing her narcotic pain medications or using any illegal drugs. There are No indicators for possible drug abuse, addiction or diversion problems. Patient reports she has been doing fair, pain has been baseline. She complains she does have flare ups. She says she is using all the medications as before. She reports the cramping is better. She states she is smoking about a pack per day. She says her breathing has been okay. She reports she has been using her adjuvants. ALLERGIES: Patients list of allergies were reviewed     MEDICATIONS: Ms. Imani Alexis list of medications were reviewed. Her current medications are   Outpatient Medications Prior to Visit   Medication Sig Dispense Refill    methocarbamol (ROBAXIN) 500 MG tablet TAKE 1 TABLET BY MOUTH TWICE A DAY 60 tablet 1    pantoprazole (PROTONIX) 40 MG tablet Take 1 tablet by mouth daily 30 tablet 1    traZODone (DESYREL) 50 MG tablet Take 1-2 tablets by mouth nightly 60 tablet 1    DULoxetine (CYMBALTA) 60 MG extended release capsule Take one tablet at night 30 capsule 1    gabapentin (NEURONTIN) 600 MG tablet Take 1 tablet by mouth 2 times daily for 30 days. 60 tablet 1    oxyCODONE-acetaminophen (PERCOCET) 7.5-325 MG per tablet Take 1 tablet by mouth every 6 hours as needed for Pain for up to 28 days.  Max 4 a day 112 tablet 0    meloxicam (MOBIC) 15 MG tablet TAKE 1 TABLET BY MOUTH DAILY 30 tablet 1    Fluticasone furoate-vilanterol (BREO ELLIPTA) 200-25 MCG/INH AEPB inhaler Inhale 1 puff into the lungs daily 1 each 11    varenicline (CHANTIX) 0.5 MG tablet Take 1-2 tablets by mouth See Admin Instructions 0.5mg DAILY for 3 days followed by 0.5mg TWICE DAILY for 4 days followed by 1mg TWICE DAILY 57 tablet 0    varenicline (CHANTIX) 1 MG tablet Take 1 tablet by mouth 2 times daily 180 tablet 0    atorvastatin (LIPITOR) 80 MG tablet TAKE 1 TABLET BY MOUTH EVERY DAY FOR CHOLESTEROL 90 tablet 1    albuterol sulfate  (90 Base) MCG/ACT inhaler Inhale 2 puffs into the lungs every 6 hours as needed for Shortness of Breath 1 Inhaler 11    Blood Glucose Monitoring Suppl (ONE TOUCH ULTRA MINI) w/Device KIT 1 kit by Does not apply route daily 1 kit 0    ONE TOUCH LANCETS MISC 1 each by Does not apply route daily 100 each 3    blood glucose test strips (ASCENSIA AUTODISC VI;ONE TOUCH ULTRA TEST VI) strip 1 each by In Vitro route daily 100 each 3    ipratropium-albuterol (DUONEB) 0.5-2.5 (3) MG/3ML SOLN nebulizer solution Take 1 aerosol every 4 hours for 2 days and then as needed for shortness of breath coughing and wheezing 360 mL 3    Cholecalciferol (VITAMIN D) 2000 units TABS tablet Take 1 tablet by mouth daily 30 tablet     cetirizine (ZYRTEC) 10 MG tablet Take 10 mg by mouth daily as needed for Allergies      Nebulizers (COMPRESSOR/NEBULIZER) MISC 1 Units tablet 1    pantoprazole (PROTONIX) 40 MG tablet Take 1 tablet by mouth daily 30 tablet 1    traZODone (DESYREL) 50 MG tablet Take 1-2 tablets by mouth nightly 60 tablet 1    DULoxetine (CYMBALTA) 60 MG extended release capsule Take one tablet at night 30 capsule 1    gabapentin (NEURONTIN) 600 MG tablet Take 1 tablet by mouth 2 times daily for 30 days. 60 tablet 1    oxyCODONE-acetaminophen (PERCOCET) 7.5-325 MG per tablet Take 1 tablet by mouth every 6 hours as needed for Pain for up to 28 days.  Max 4 a day 112 tablet 0    meloxicam (MOBIC) 15 MG tablet TAKE 1 TABLET BY MOUTH DAILY 30 tablet 1    Fluticasone furoate-vilanterol (BREO ELLIPTA) 200-25 MCG/INH AEPB inhaler Inhale 1 puff into the lungs daily 1 each 11    varenicline (CHANTIX) 0.5 MG tablet Take 1-2 tablets by mouth See Admin Instructions 0.5mg DAILY for 3 days followed by 0.5mg TWICE DAILY for 4 days followed by 1mg TWICE DAILY 57 tablet 0    varenicline (CHANTIX) 1 MG tablet Take 1 tablet by mouth 2 times daily 180 tablet 0    atorvastatin (LIPITOR) 80 MG tablet TAKE 1 TABLET BY MOUTH EVERY DAY FOR CHOLESTEROL 90 tablet 1    albuterol sulfate  (90 Base) MCG/ACT inhaler Inhale 2 puffs into the lungs every 6 hours as needed for Shortness of Breath 1 Inhaler 11    Blood Glucose Monitoring Suppl (ONE TOUCH ULTRA MINI) w/Device KIT 1 kit by Does not apply route daily 1 kit 0    ONE TOUCH LANCETS MISC 1 each by Does not apply route daily 100 each 3    blood glucose test strips (ASCENSIA AUTODISC VI;ONE TOUCH ULTRA TEST VI) strip 1 each by In Vitro route daily 100 each 3    ipratropium-albuterol (DUONEB) 0.5-2.5 (3) MG/3ML SOLN nebulizer solution Take 1 aerosol every 4 hours for 2 days and then as needed for shortness of breath coughing and wheezing 360 mL 3    Cholecalciferol (VITAMIN D) 2000 units TABS tablet Take 1 tablet by mouth daily 30 tablet     cetirizine (ZYRTEC) 10 MG tablet Take 10 mg by mouth daily as needed for Allergies  Nebulizers (COMPRESSOR/NEBULIZER) MISC 1 Units by Does not apply route 4 times daily 1 each 3     No current facility-administered medications for this visit. I will continue her current medication regimen  which is part of the above treatment schedule. It has been helping with Ms. Nyla Davison chronic  medical problems which for this visit include:   Diagnoses of Chronic pain syndrome, Muscle cramping, Osteoarthritis of spine with radiculopathy, lumbar region, Fibromyalgia, DDD (degenerative disc disease), lumbar, Lumbar spondylosis, Degeneration of lumbar or lumbosacral intervertebral disc, Trochanteric bursitis of right hip, Gastroesophageal reflux disease without esophagitis, Primary insomnia, and Moderate episode of recurrent major depressive disorder ([facility] [address] were pertinent to this visit. Risks and benefits of the medications and other alternative treatments  including no treatment were discussed with the patient. The common side effects of these medications were also explained to the patient. Informed verbal consent was obtained. Goals of current treatment regimen include improvement in pain, restoration of functioning- with focus on improvement in physical performance, general activity, work or disability,emotional distress, health care utilization and  decreased medication consumption. Will continue to monitor progress towards achieving/maintaining therapeutic goals with special emphasis on  1. Improvement in perceived interfernce  of pain with ADL's. Ability to do home exercises independently. Ability to do household chores indoor and/or outdoor work and social and leisure activities. Improve psychosocial and physical functioning. - she is showing progression towards this treatment goal with the current regimen.        She was advised against drinking alcohol with the narcotic pain medicines, advised against driving or handling machinery while adjusting the dose of medicines or if having cognitive issues related to the current medications. Risk of overdose and death, if medicines not taken as prescribed, were also discussed. If the patient develops new symptoms or if the symptoms worsen, the patient should call the office. While transcribing every attempt was made to maintain the accuracy of the note in terms of it's contents,there may have been some errors made inadvertently. Thank you for allowing me to participate in the care of this patient.     Jean Gutierrez MD.    Cc: Fabiola Castañeda, APRN - CNP

## 2021-08-18 DIAGNOSIS — G89.4 CHRONIC PAIN SYNDROME: ICD-10-CM

## 2021-08-18 RX ORDER — DULOXETIN HYDROCHLORIDE 60 MG/1
CAPSULE, DELAYED RELEASE ORAL
Qty: 30 CAPSULE | Refills: 1 | OUTPATIENT
Start: 2021-08-18

## 2021-08-26 ENCOUNTER — OFFICE VISIT (OUTPATIENT)
Dept: FAMILY MEDICINE CLINIC | Age: 76
End: 2021-08-26
Payer: MEDICARE

## 2021-08-26 VITALS
HEART RATE: 67 BPM | TEMPERATURE: 98.4 F | SYSTOLIC BLOOD PRESSURE: 130 MMHG | HEIGHT: 64 IN | OXYGEN SATURATION: 96 % | BODY MASS INDEX: 23.56 KG/M2 | WEIGHT: 138 LBS | DIASTOLIC BLOOD PRESSURE: 80 MMHG

## 2021-08-26 DIAGNOSIS — J44.9 COPD, MODERATE (HCC): ICD-10-CM

## 2021-08-26 DIAGNOSIS — E11.9 CONTROLLED TYPE 2 DIABETES MELLITUS WITHOUT COMPLICATION, WITHOUT LONG-TERM CURRENT USE OF INSULIN (HCC): Primary | ICD-10-CM

## 2021-08-26 DIAGNOSIS — M85.89 OSTEOPENIA OF MULTIPLE SITES: ICD-10-CM

## 2021-08-26 DIAGNOSIS — K21.9 GASTROESOPHAGEAL REFLUX DISEASE WITHOUT ESOPHAGITIS: ICD-10-CM

## 2021-08-26 DIAGNOSIS — Z12.31 ENCOUNTER FOR SCREENING MAMMOGRAM FOR MALIGNANT NEOPLASM OF BREAST: ICD-10-CM

## 2021-08-26 DIAGNOSIS — E78.00 HYPERCHOLESTEREMIA: ICD-10-CM

## 2021-08-26 LAB — HBA1C MFR BLD: 5.7 %

## 2021-08-26 PROCEDURE — G8420 CALC BMI NORM PARAMETERS: HCPCS | Performed by: NURSE PRACTITIONER

## 2021-08-26 PROCEDURE — G8427 DOCREV CUR MEDS BY ELIG CLIN: HCPCS | Performed by: NURSE PRACTITIONER

## 2021-08-26 PROCEDURE — 99214 OFFICE O/P EST MOD 30 MIN: CPT | Performed by: NURSE PRACTITIONER

## 2021-08-26 PROCEDURE — 3044F HG A1C LEVEL LT 7.0%: CPT | Performed by: NURSE PRACTITIONER

## 2021-08-26 PROCEDURE — G8926 SPIRO NO PERF OR DOC: HCPCS | Performed by: NURSE PRACTITIONER

## 2021-08-26 PROCEDURE — 4004F PT TOBACCO SCREEN RCVD TLK: CPT | Performed by: NURSE PRACTITIONER

## 2021-08-26 PROCEDURE — 2022F DILAT RTA XM EVC RTNOPTHY: CPT | Performed by: NURSE PRACTITIONER

## 2021-08-26 PROCEDURE — 1123F ACP DISCUSS/DSCN MKR DOCD: CPT | Performed by: NURSE PRACTITIONER

## 2021-08-26 PROCEDURE — 4040F PNEUMOC VAC/ADMIN/RCVD: CPT | Performed by: NURSE PRACTITIONER

## 2021-08-26 PROCEDURE — 3017F COLORECTAL CA SCREEN DOC REV: CPT | Performed by: NURSE PRACTITIONER

## 2021-08-26 PROCEDURE — 3023F SPIROM DOC REV: CPT | Performed by: NURSE PRACTITIONER

## 2021-08-26 PROCEDURE — G8399 PT W/DXA RESULTS DOCUMENT: HCPCS | Performed by: NURSE PRACTITIONER

## 2021-08-26 PROCEDURE — 83036 HEMOGLOBIN GLYCOSYLATED A1C: CPT | Performed by: NURSE PRACTITIONER

## 2021-08-26 PROCEDURE — 1090F PRES/ABSN URINE INCON ASSESS: CPT | Performed by: NURSE PRACTITIONER

## 2021-08-26 RX ORDER — ATORVASTATIN CALCIUM 80 MG/1
TABLET, FILM COATED ORAL
Qty: 90 TABLET | Refills: 1 | Status: SHIPPED | OUTPATIENT
Start: 2021-08-26 | End: 2022-02-25

## 2021-08-26 ASSESSMENT — ENCOUNTER SYMPTOMS
ABDOMINAL PAIN: 0
SHORTNESS OF BREATH: 1
SINUS PAIN: 0
EYE ITCHING: 0
CHEST TIGHTNESS: 0
WHEEZING: 0
COUGH: 0

## 2021-08-26 NOTE — PROGRESS NOTES
8/26/2021    This is a 78 y.o. female   Chief Complaint   Patient presents with    Follow-up     f/u check   . HPI  This is a 77 y/o female presenting for follow-up of hyperlipidemia, prediabetes, GERD, COPD, osteopenia,    Hyperlipidemia: Tolerating Lipitor well. No new myalgias or GI upset on atorvastatin (Lipitor). Tries to maintain low fat diet supplemented with mild exercise. GERD: Patient reports pain management specialist, Dr. Janay Sánchez, increased Protonix dose from 20 mg to 40 mg due to increased reflux symptoms because of daily Meloxicam use. COPD: Stable with maintenance inhaler and rescue inhaler. She continues to be followed by pulmonologist Dr. Marisol Garcia. Quit smoking 1/1/20 but now she restarted again and smoking 1 PPD. Reports that chantix made her have vomiting and she was not able to tolerate. Osteopenia: Last DEXA was 11/2017. Denies new pain. Taking calcium and vitamin D supplement daily. Scheduled for repeat next month. Insomnia: Denies issues. On trazodone. Diabetes Mellitus Type 2: Current symptoms/problems include none. Home blood sugar records: patient does not test  Any episodes of hypoglycemia? no  Eye exam current (within one year): no   reports that she has been smoking cigarettes. She started smoking about 59 years ago. She has a 55.00 pack-year smoking history.  She has never used smokeless tobacco.       Lab Results   Component Value Date    LABA1C 5.9 02/22/2021    LABA1C 6.1 05/11/2020    LABA1C 6.6 01/31/2020     Lab Results   Component Value Date    LABMICR <1.20 04/20/2021    CREATININE 0.9 02/22/2021     Lab Results   Component Value Date    ALT 14 02/22/2021    AST 12 (L) 02/22/2021     Lab Results   Component Value Date    CHOL 175 02/22/2021    TRIG 162 (H) 02/22/2021    HDL 57 02/22/2021    LDLCALC 86 02/22/2021    LDLDIRECT 176 (H) 10/12/2012           Patient Active Problem List   Diagnosis    Osteopenia-last dexa 2009 repeat 2015 (-2.4 hip)--advised tablet TAKE 1 TABLET BY MOUTH DAILY 30 tablet 1    Fluticasone furoate-vilanterol (BREO ELLIPTA) 200-25 MCG/INH AEPB inhaler Inhale 1 puff into the lungs daily 1 each 11    albuterol sulfate  (90 Base) MCG/ACT inhaler Inhale 2 puffs into the lungs every 6 hours as needed for Shortness of Breath 1 Inhaler 11    Blood Glucose Monitoring Suppl (ONE TOUCH ULTRA MINI) w/Device KIT 1 kit by Does not apply route daily 1 kit 0    ONE TOUCH LANCETS MISC 1 each by Does not apply route daily 100 each 3    blood glucose test strips (ASCENSIA AUTODISC VI;ONE TOUCH ULTRA TEST VI) strip 1 each by In Vitro route daily 100 each 3    ipratropium-albuterol (DUONEB) 0.5-2.5 (3) MG/3ML SOLN nebulizer solution Take 1 aerosol every 4 hours for 2 days and then as needed for shortness of breath coughing and wheezing 360 mL 3    Cholecalciferol (VITAMIN D) 2000 units TABS tablet Take 1 tablet by mouth daily 30 tablet     cetirizine (ZYRTEC) 10 MG tablet Take 10 mg by mouth daily as needed for Allergies      Nebulizers (COMPRESSOR/NEBULIZER) MISC 1 Units by Does not apply route 4 times daily 1 each 3    varenicline (CHANTIX) 1 MG tablet Take 1 tablet by mouth 2 times daily (Patient not taking: Reported on 8/26/2021) 180 tablet 0     No current facility-administered medications for this visit. No Known Allergies    Review of Systems   Constitutional: Positive for fatigue. Negative for activity change and fever. HENT: Negative for congestion and sinus pain. Eyes: Negative for itching. Respiratory: Positive for shortness of breath (no worse than baseline). Negative for cough, chest tightness and wheezing. Cardiovascular: Negative for chest pain, palpitations and leg swelling. Gastrointestinal: Negative for abdominal pain. Genitourinary: Negative for difficulty urinating, dysuria, frequency, hematuria and pelvic pain. Musculoskeletal: Positive for arthralgias and myalgias.    Neurological: Negative for dizziness, syncope, weakness, numbness and headaches. Psychiatric/Behavioral: Negative for confusion. All other systems reviewed and are negative. Vitals:    08/26/21 1349   BP: 130/80   Pulse: 67   Temp: 98.4 °F (36.9 °C)   TempSrc: Oral   SpO2: 96%   Weight: 138 lb (62.6 kg)   Height: 5' 4\" (1.626 m)       Body mass index is 23.69 kg/m². Wt Readings from Last 3 Encounters:   08/26/21 138 lb (62.6 kg)   07/27/21 132 lb (59.9 kg)   06/29/21 132 lb (59.9 kg)       BP Readings from Last 3 Encounters:   08/26/21 130/80   07/27/21 122/72   06/29/21 (!) 166/77       Physical Exam  Vitals and nursing note reviewed. Constitutional:       General: She is not in acute distress. Appearance: She is well-developed. HENT:      Head: Normocephalic and atraumatic. Eyes:      Extraocular Movements: Extraocular movements intact. Conjunctiva/sclera: Conjunctivae normal.   Cardiovascular:      Rate and Rhythm: Normal rate and regular rhythm. Heart sounds: Normal heart sounds. No murmur heard. No friction rub. No gallop. Pulmonary:      Effort: Pulmonary effort is normal. No respiratory distress. Breath sounds: Normal breath sounds. Musculoskeletal:      Cervical back: Neck supple. Right lower leg: No edema. Left lower leg: No edema. Skin:     General: Skin is warm and dry. Neurological:      Mental Status: She is alert and oriented to person, place, and time. Psychiatric:         Behavior: Behavior normal.         Thought Content: Thought content normal.         Judgment: Judgment normal.         Assessmentand Plan  Katy Petit was seen today for follow-up. Diagnoses and all orders for this visit:    Hypercholesteremia  -     LIPIDs controled 2/2021  Continue atorvastatin 80 mg daily. Advised to continue low fat/choleserol diet supplemented with aerobic exercise. Gastroesophageal reflux disease, esophagitis presence not specified  Continue Protonix daily.   Advised to avoid foods associated with acid reflux. DM type 2: diet controlled   -     Hemoglobin A1C- 5.7%  Advised to continue low carb/fat diet supplemented with aerobic exercise. COPD, moderate ([address]): stable  Continue breo ellipta daiy and albuterol inhaler BID prn and nebulizer PRN  Continue to follow with pulmonology (Dr. Ke Licea). Smoking cessation discussed and needed. Osteopenia of multiple sites  Continue calcium and vitamin D supplements. Last DEXA scan 11/2017. Will repeat DEXA scan next month as scheduled     Tobacco abuse-advised to quit--lung cancer screening neg 5/18--repeat low dose ct 1 yr  She is back smoking at least a pack per day. Quit 1/1/20. Advised smoking cessation again. She does not want to quit smoking at this time. Encounter for screening mammogram for malignant neoplasm of breast  -     KEHINDE DIGITAL SCREEN W OR WO CAD BILATERAL; Future    Return in about 6 months (around 2/26/2022), or if symptoms worsen or fail to improve, for chronic conditions.

## 2021-08-31 ENCOUNTER — OFFICE VISIT (OUTPATIENT)
Dept: PAIN MANAGEMENT | Age: 76
End: 2021-08-31
Payer: MEDICARE

## 2021-08-31 VITALS
DIASTOLIC BLOOD PRESSURE: 72 MMHG | WEIGHT: 138 LBS | SYSTOLIC BLOOD PRESSURE: 144 MMHG | BODY MASS INDEX: 23.69 KG/M2 | HEART RATE: 66 BPM

## 2021-08-31 DIAGNOSIS — F33.1 MODERATE EPISODE OF RECURRENT MAJOR DEPRESSIVE DISORDER (HCC): ICD-10-CM

## 2021-08-31 DIAGNOSIS — F51.01 PRIMARY INSOMNIA: ICD-10-CM

## 2021-08-31 DIAGNOSIS — G89.4 CHRONIC PAIN SYNDROME: ICD-10-CM

## 2021-08-31 DIAGNOSIS — M79.7 FIBROMYALGIA: ICD-10-CM

## 2021-08-31 DIAGNOSIS — M51.37 DEGENERATION OF LUMBAR OR LUMBOSACRAL INTERVERTEBRAL DISC: ICD-10-CM

## 2021-08-31 DIAGNOSIS — K21.9 GASTROESOPHAGEAL REFLUX DISEASE WITHOUT ESOPHAGITIS: ICD-10-CM

## 2021-08-31 DIAGNOSIS — M47.816 LUMBAR SPONDYLOSIS: ICD-10-CM

## 2021-08-31 DIAGNOSIS — M70.61 TROCHANTERIC BURSITIS OF RIGHT HIP: ICD-10-CM

## 2021-08-31 DIAGNOSIS — M51.36 DDD (DEGENERATIVE DISC DISEASE), LUMBAR: ICD-10-CM

## 2021-08-31 DIAGNOSIS — R25.2 MUSCLE CRAMPING: ICD-10-CM

## 2021-08-31 DIAGNOSIS — M47.26 OSTEOARTHRITIS OF SPINE WITH RADICULOPATHY, LUMBAR REGION: ICD-10-CM

## 2021-08-31 PROCEDURE — 3017F COLORECTAL CA SCREEN DOC REV: CPT | Performed by: INTERNAL MEDICINE

## 2021-08-31 PROCEDURE — G8420 CALC BMI NORM PARAMETERS: HCPCS | Performed by: INTERNAL MEDICINE

## 2021-08-31 PROCEDURE — 99214 OFFICE O/P EST MOD 30 MIN: CPT | Performed by: INTERNAL MEDICINE

## 2021-08-31 PROCEDURE — 1123F ACP DISCUSS/DSCN MKR DOCD: CPT | Performed by: INTERNAL MEDICINE

## 2021-08-31 PROCEDURE — 4004F PT TOBACCO SCREEN RCVD TLK: CPT | Performed by: INTERNAL MEDICINE

## 2021-08-31 PROCEDURE — G8399 PT W/DXA RESULTS DOCUMENT: HCPCS | Performed by: INTERNAL MEDICINE

## 2021-08-31 PROCEDURE — 4040F PNEUMOC VAC/ADMIN/RCVD: CPT | Performed by: INTERNAL MEDICINE

## 2021-08-31 PROCEDURE — G8427 DOCREV CUR MEDS BY ELIG CLIN: HCPCS | Performed by: INTERNAL MEDICINE

## 2021-08-31 PROCEDURE — 1090F PRES/ABSN URINE INCON ASSESS: CPT | Performed by: INTERNAL MEDICINE

## 2021-08-31 RX ORDER — GABAPENTIN 600 MG/1
600 TABLET ORAL 2 TIMES DAILY
Qty: 60 TABLET | Refills: 1 | Status: SHIPPED | OUTPATIENT
Start: 2021-08-31 | End: 2021-09-28 | Stop reason: SDUPTHER

## 2021-08-31 RX ORDER — DULOXETIN HYDROCHLORIDE 60 MG/1
60 CAPSULE, DELAYED RELEASE ORAL NIGHTLY
Qty: 30 CAPSULE | Refills: 1 | Status: SHIPPED | OUTPATIENT
Start: 2021-08-31 | End: 2021-09-28 | Stop reason: SDUPTHER

## 2021-08-31 RX ORDER — OXYCODONE AND ACETAMINOPHEN 7.5; 325 MG/1; MG/1
1 TABLET ORAL EVERY 6 HOURS PRN
Qty: 112 TABLET | Refills: 0 | Status: SHIPPED | OUTPATIENT
Start: 2021-08-31 | End: 2021-09-28 | Stop reason: SDUPTHER

## 2021-08-31 RX ORDER — TIZANIDINE 4 MG/1
4 TABLET ORAL NIGHTLY
Qty: 30 TABLET | Refills: 0 | Status: SHIPPED | OUTPATIENT
Start: 2021-08-31 | End: 2021-09-28 | Stop reason: SDUPTHER

## 2021-08-31 RX ORDER — PANTOPRAZOLE SODIUM 40 MG/1
40 TABLET, DELAYED RELEASE ORAL DAILY
Qty: 30 TABLET | Refills: 1 | Status: SHIPPED | OUTPATIENT
Start: 2021-08-31 | End: 2021-09-28 | Stop reason: SDUPTHER

## 2021-08-31 NOTE — PROGRESS NOTES
Ericka Linda  1945  [id number]    HISTORY OF PRESENT ILLNESS:  Ms. Sharlene Jj is a 78 y.o. female returns for a follow up visit for multiple medical problems. Her current presenting problems are   1. Chronic pain syndrome    2. Muscle cramping    3. Osteoarthritis of spine with radiculopathy, lumbar region    4. Fibromyalgia    5. DDD (degenerative disc disease), lumbar    6. Lumbar spondylosis    7. Degeneration of lumbar or lumbosacral intervertebral disc    8. Trochanteric bursitis of right hip    9. Gastroesophageal reflux disease without esophagitis    10. Primary insomnia    11. Moderate episode of recurrent major depressive disorder ([facility] [address]    . As per information/history obtained from the PADT(patient assessment and documentation tool) - She complains of pain in the lower back with radiation to the buttocks She rates the pain 6/10 and describes it as aching. Pain is made worse by: movement, walking, standing, sitting, bending, lifting. Current treatment regimen has helped relieve about 30% of the pain. She denies side effects from the current pain regimen. Patient reports that since the last follow up visit the physical functioning is unchanged, family/social relationships are unchanged, mood is unchanged and sleep patterns are unchanged, and that the overall functioning is unchanged. Patient denies neurological bowel or bladder. Patient denies misusing/abusing her narcotic pain medications or using any illegal drugs. There are No indicators for possible drug abuse, addiction or diversion problems. Upon obtaining the medical history from Ms. Sharlene Jj regarding today's office visit for her presenting problems, patient states she has been having cramping in the legs still, she states she is using Robaxin along with MGO which is not working. She states she is using Neurontin also. Ms. Sharlene Jj says her pain has been baseline, she has good and bad days. Patient mentions she is using Percocet 4 per day. Patient denies any constipation symptoms. Denies abdominal pain, dysphagia, gas bloat, heartburn, nausea, odynophagia, regurgitation, vomiting and water brash-GERD Sxs are controlled  with the medications, she is on Protonix. Patient states she sleeps well. Has normal sleep latency. Averages about 5-7 hours of sleep a night. Denies any signs of sleep apnea. Feels rested in the AM. Denies any sleep attacks during the day. Medication regimen helps with maintaining/regulating sleep, she is on Trazodone. Patient's  subjective report of her mood is fair. she describes occasional symptoms of depression, occasional  irritability and some mood swings. Describes her mood as being neutral and reports some pleasure in her daily activities. Reports  fair  appetite, energy and concentration. Able to function well in different aspects of her daily activities. Denies suicidal or homicidal ideation. Denies any complaints of increased tension, does   Worry sometimes and occasional  irritability  she denies any c/o increased anxiety, No c/o panic attacks or symptoms of PTSD. She reports her breathing has been baseline. ALLERGIES/PAST MED/FAM/SOC HISTORY: Ms. Zack Hernández allergies, past medical, family and social history were reviewed in the chart. Ms. Zack Hernández current medications are   Outpatient Medications Prior to Visit   Medication Sig Dispense Refill    atorvastatin (LIPITOR) 80 MG tablet TAKE 1 TABLET BY MOUTH EVERY DAY FOR CHOLESTEROL 90 tablet 1    DULoxetine (CYMBALTA) 60 MG extended release capsule Take 1 capsule by mouth nightly 30 capsule 1    oxyCODONE-acetaminophen (PERCOCET) 7.5-325 MG per tablet Take 1 tablet by mouth every 6 hours as needed for Pain for up to 35 days.  Max 4 a day 140 tablet 0    methocarbamol (ROBAXIN) 500 MG tablet TAKE 1 TABLET BY MOUTH TWICE A DAY 60 tablet 1    pantoprazole (PROTONIX) 40 MG tablet Take 1 tablet by mouth daily 30 tablet 1    traZODone (DESYREL) 50 MG tablet Take 1-2 tablets by mouth nightly 60 tablet 1    meloxicam (MOBIC) 15 MG tablet TAKE 1 TABLET BY MOUTH DAILY 30 tablet 1    Fluticasone furoate-vilanterol (BREO ELLIPTA) 200-25 MCG/INH AEPB inhaler Inhale 1 puff into the lungs daily 1 each 11    albuterol sulfate  (90 Base) MCG/ACT inhaler Inhale 2 puffs into the lungs every 6 hours as needed for Shortness of Breath 1 Inhaler 11    Blood Glucose Monitoring Suppl (ONE TOUCH ULTRA MINI) w/Device KIT 1 kit by Does not apply route daily 1 kit 0    ONE TOUCH LANCETS MISC 1 each by Does not apply route daily 100 each 3    blood glucose test strips (ASCENSIA AUTODISC VI;ONE TOUCH ULTRA TEST VI) strip 1 each by In Vitro route daily 100 each 3    ipratropium-albuterol (DUONEB) 0.5-2.5 (3) MG/3ML SOLN nebulizer solution Take 1 aerosol every 4 hours for 2 days and then as needed for shortness of breath coughing and wheezing 360 mL 3    Cholecalciferol (VITAMIN D) 2000 units TABS tablet Take 1 tablet by mouth daily 30 tablet     cetirizine (ZYRTEC) 10 MG tablet Take 10 mg by mouth daily as needed for Allergies      Nebulizers (COMPRESSOR/NEBULIZER) MISC 1 Units by Does not apply route 4 times daily 1 each 3    gabapentin (NEURONTIN) 600 MG tablet Take 1 tablet by mouth 2 times daily for 30 days. 60 tablet 1     No facility-administered medications prior to visit. REVIEW OF SYSTEMS:     Respiratory: Negative for shortness of breath. Cardiovascular: Negative for chest pain, palpitations  Gastrointestinal: Negative for blood in stool, abdominal distention, nausea, vomiting, abdominal pain, diarrhea,constipation. Neurological: Negative for speech difficulty, weakness and light-headedness, dizziness, tremors, sleepiness  Psychiatric/Behavioral: Negative for suicidal ideas, hallucinations, behavioral problems, self-injury, decreased concentration/cognition, agitation, confusion. PHYSICAL EXAM:   Nursing note and vitals reviewed.  BP (!) 144/72   Pulse 66   Wt 138 lb (62.6 kg)   BMI 23.69 kg/m²   General Appearance: Patient is well nourished, well developed, well groomed and in no acute distress. Skin: Skin is warm and dry, good turgor . No rash or lesions noted. She is not diaphoretic. Pulmonary/Chest: Effort normal. No respiratory distress or use of accessory muscles. Auscultation revealing decreased air exchange bilaterally. She does not have wheezes in the lung fields. She has no rales. Cardiovascular: Normal rate, regular rhythm, normal heart sounds, and does not have murmur. Exam reveals no gallop and no friction rub. Musculoskeletal / Extremities: Range of motion is normal. Gait is normal, assistive devices use: none. She exhibits edema: none, and no tenderness. Neurological/Psychiatric:She is alert and oriented to person, place, and time. Coordination is  normal.   Judgement and Insight is normal  Her mood is Appropriate and affect is Flat/blunted and Anxious . Her behavior is normal.   thought content normal.        IMPRESSION:     1. Muscle cramping - INADEQUATELY CONTROLLED: treatment plan adjusted as below    2. Chronic pain syndrome - STABLE: Continue current treatment plan    3. Osteoarthritis of spine with radiculopathy, lumbar region - STABLE: Continue current treatment plan    4. Fibromyalgia - STABLE: Continue current treatment plan    5. DDD (degenerative disc disease), lumbar - STABLE: Continue current treatment plan    6. Lumbar spondylosis - STABLE: Continue current treatment plan    7. Degeneration of lumbar or lumbosacral intervertebral disc - STABLE: Continue current treatment plan    8. Trochanteric bursitis of right hip - STABLE: Continue current treatment plan    9. Gastroesophageal reflux disease without esophagitis - STABLE: Continue current treatment plan    10. Primary insomnia - STABLE: Continue current treatment plan    11.  Moderate episode of recurrent major depressive disorder (HCC) - STABLE: Continue current treatment plan        PLAN:  Informed verbal consent was obtained. -ROM/Stretching exercises as advised   -Discontinue Robaxin  -Start Zanaflex 4 mg nightly  -Continue with  mg nightly to help with cramping  -Advised to increase fluids   -she was advised proper sleep hygiene-told to avoid:use of caffeine or other stimulants after noon, alcohol use near bedtime, long or frequent naps during the day, erratic sleep schedule, heavy meals near bedtime, vigorous exercise near bedtime and use of electronic devices near bedtime   -Continue with Trazodone   -CBT techniques- relaxation therapies such as biofeedback, mindfulness based stress reduction, imagery, cognitive restructuring, problem solving discussed with patient   -Continue with Cymbalta   -Advised caffeine reduction, dietary changes, elevate head end of bed, NPO after supper, if using alcohol advised reduction of alcohol intake, tobacco cessation if smoking, weight loss   -Continue with Protonix   -Advised patient to quit smoking for  health related concerns and to improve the treatment outcomes. Education was given on quitting smoking and the use of different modalities including medications, hypnotherapy, counselling  and biofeedback.  These were discussed with patient.   -She was advised to increase fluids ( 5-7  glasses of fluid daily), limit caffeine, avoid cheese products, increase dietary fiber, increase activity and exercise as tolerated and relax regularly and enjoy meals     Ms. Rosalba Ravi will be prescribed  the medications  listed below which are for treatment of her presenting  medical problems which for this visit include:   Diagnoses of Chronic pain syndrome, Muscle cramping, Osteoarthritis of spine with radiculopathy, lumbar region, Fibromyalgia, DDD (degenerative disc disease), lumbar, Lumbar spondylosis, Degeneration of lumbar or lumbosacral intervertebral disc, Trochanteric bursitis of right hip, Gastroesophageal reflux disease without esophagitis, Primary insomnia, and Moderate episode of recurrent major depressive disorder ([facility] [address] were pertinent to this visit. Medications/orders associated with this visit:    Current Outpatient Medications   Medication Sig Dispense Refill    atorvastatin (LIPITOR) 80 MG tablet TAKE 1 TABLET BY MOUTH EVERY DAY FOR CHOLESTEROL 90 tablet 1    DULoxetine (CYMBALTA) 60 MG extended release capsule Take 1 capsule by mouth nightly 30 capsule 1    oxyCODONE-acetaminophen (PERCOCET) 7.5-325 MG per tablet Take 1 tablet by mouth every 6 hours as needed for Pain for up to 35 days.  Max 4 a day 140 tablet 0    methocarbamol (ROBAXIN) 500 MG tablet TAKE 1 TABLET BY MOUTH TWICE A DAY 60 tablet 1    pantoprazole (PROTONIX) 40 MG tablet Take 1 tablet by mouth daily 30 tablet 1    traZODone (DESYREL) 50 MG tablet Take 1-2 tablets by mouth nightly 60 tablet 1    meloxicam (MOBIC) 15 MG tablet TAKE 1 TABLET BY MOUTH DAILY 30 tablet 1    Fluticasone furoate-vilanterol (BREO ELLIPTA) 200-25 MCG/INH AEPB inhaler Inhale 1 puff into the lungs daily 1 each 11    albuterol sulfate  (90 Base) MCG/ACT inhaler Inhale 2 puffs into the lungs every 6 hours as needed for Shortness of Breath 1 Inhaler 11    Blood Glucose Monitoring Suppl (ONE TOUCH ULTRA MINI) w/Device KIT 1 kit by Does not apply route daily 1 kit 0    ONE TOUCH LANCETS MISC 1 each by Does not apply route daily 100 each 3    blood glucose test strips (ASCENSIA AUTODISC VI;ONE TOUCH ULTRA TEST VI) strip 1 each by In Vitro route daily 100 each 3    ipratropium-albuterol (DUONEB) 0.5-2.5 (3) MG/3ML SOLN nebulizer solution Take 1 aerosol every 4 hours for 2 days and then as needed for shortness of breath coughing and wheezing 360 mL 3    Cholecalciferol (VITAMIN D) 2000 units TABS tablet Take 1 tablet by mouth daily 30 tablet     cetirizine (ZYRTEC) 10 MG tablet Take 10 mg by mouth daily as needed for Allergies      Nebulizers (COMPRESSOR/NEBULIZER) MISC 1 Units by Does not apply route 4 times daily 1 each 3    gabapentin (NEURONTIN) 600 MG tablet Take 1 tablet by mouth 2 times daily for 30 days. 60 tablet 1     No current facility-administered medications for this visit. Goals of current treatment regimen include improvement in pain, restoration of functioning- with focus on improvement in physical performance, general activity, work or disability,emotional distress, health care utilization and  decreased medication consumption. Will continue to monitor progress towards achieving/maintaining therapeutic goals with special emphasis on  1. Improvement in perceived interfernce  of pain with ADL's. Ability to do home exercises independently. Ability to do household chores indoor and/or outdoor work and social and leisure activities. To increase flexibility/ROM, strength and endurance. Improve psychosocial and physical functioning.- she is not showing any significant progress/or showing regression  towards this goal and reassessment and adjustment of goals/treatment have been made. 2. Improving sleep to 6-7 hours a night. Improve mood/ anxiety and depression symptoms such as crying spells, low energy, problems with concentration, motivation. - she is showing progression towards this treatment goal with the current regimen. 3. Reduction of reliance on opioid analgesia/more appropriate opioid use. - she is showing progression towards this treatment goal with the current regimen. Risks and benefits of the medications and other alternative treatments have been/were  discussed with the patient. Any questions on the  common side effects of these medications were also answered. She was advised against drinking alcohol with the narcotic pain medicines, advised against driving or handling machinery when  starting or adjusting the dose of medicines, feeling groggy or drowsy, or if having any cognitive issues related to the current medications.  Sheis fully aware of the risk of overdose and death, if medicines are misused and not taken as prescribed. If she develops new symptoms or if the symptoms worsen, she was told to call the office. .  Thank you for allowing me to participate in the care of this patient.     Jean Gutierrez MD.    Cc: Fabiola Castañeda, APRN - CNP

## 2021-09-07 ENCOUNTER — HOSPITAL ENCOUNTER (OUTPATIENT)
Dept: WOMENS IMAGING | Age: 76
Discharge: HOME | End: 2021-09-07
Payer: MEDICARE

## 2021-09-07 DIAGNOSIS — M85.89 OSTEOPENIA OF MULTIPLE SITES: ICD-10-CM

## 2021-09-07 PROCEDURE — 77080 DXA BONE DENSITY AXIAL: CPT

## 2021-09-14 PROBLEM — M81.0 AGE-RELATED OSTEOPOROSIS WITHOUT CURRENT PATHOLOGICAL FRACTURE: Status: ACTIVE | Noted: 2021-09-14

## 2021-09-27 ENCOUNTER — OFFICE VISIT (OUTPATIENT)
Dept: FAMILY MEDICINE CLINIC | Age: 76
End: 2021-09-27
Payer: MEDICARE

## 2021-09-27 VITALS
HEIGHT: 64 IN | DIASTOLIC BLOOD PRESSURE: 80 MMHG | RESPIRATION RATE: 16 BRPM | WEIGHT: 139 LBS | OXYGEN SATURATION: 95 % | TEMPERATURE: 97.2 F | BODY MASS INDEX: 23.73 KG/M2 | SYSTOLIC BLOOD PRESSURE: 138 MMHG | HEART RATE: 71 BPM

## 2021-09-27 DIAGNOSIS — F33.1 MODERATE EPISODE OF RECURRENT MAJOR DEPRESSIVE DISORDER (HCC): ICD-10-CM

## 2021-09-27 DIAGNOSIS — Z23 NEED FOR INFLUENZA VACCINATION: ICD-10-CM

## 2021-09-27 DIAGNOSIS — G89.4 CHRONIC PAIN SYNDROME: ICD-10-CM

## 2021-09-27 DIAGNOSIS — M81.0 AGE-RELATED OSTEOPOROSIS WITHOUT CURRENT PATHOLOGICAL FRACTURE: Primary | ICD-10-CM

## 2021-09-27 DIAGNOSIS — K21.9 GASTROESOPHAGEAL REFLUX DISEASE WITHOUT ESOPHAGITIS: ICD-10-CM

## 2021-09-27 DIAGNOSIS — J44.9 COPD, SEVERE (HCC): ICD-10-CM

## 2021-09-27 PROCEDURE — 4004F PT TOBACCO SCREEN RCVD TLK: CPT | Performed by: NURSE PRACTITIONER

## 2021-09-27 PROCEDURE — 1123F ACP DISCUSS/DSCN MKR DOCD: CPT | Performed by: NURSE PRACTITIONER

## 2021-09-27 PROCEDURE — 99214 OFFICE O/P EST MOD 30 MIN: CPT | Performed by: NURSE PRACTITIONER

## 2021-09-27 PROCEDURE — 90694 VACC AIIV4 NO PRSRV 0.5ML IM: CPT | Performed by: NURSE PRACTITIONER

## 2021-09-27 PROCEDURE — 3023F SPIROM DOC REV: CPT | Performed by: NURSE PRACTITIONER

## 2021-09-27 PROCEDURE — 3017F COLORECTAL CA SCREEN DOC REV: CPT | Performed by: NURSE PRACTITIONER

## 2021-09-27 PROCEDURE — G8399 PT W/DXA RESULTS DOCUMENT: HCPCS | Performed by: NURSE PRACTITIONER

## 2021-09-27 PROCEDURE — 1090F PRES/ABSN URINE INCON ASSESS: CPT | Performed by: NURSE PRACTITIONER

## 2021-09-27 PROCEDURE — G8926 SPIRO NO PERF OR DOC: HCPCS | Performed by: NURSE PRACTITIONER

## 2021-09-27 PROCEDURE — G0008 ADMIN INFLUENZA VIRUS VAC: HCPCS | Performed by: NURSE PRACTITIONER

## 2021-09-27 PROCEDURE — G8420 CALC BMI NORM PARAMETERS: HCPCS | Performed by: NURSE PRACTITIONER

## 2021-09-27 PROCEDURE — 4040F PNEUMOC VAC/ADMIN/RCVD: CPT | Performed by: NURSE PRACTITIONER

## 2021-09-27 PROCEDURE — G8427 DOCREV CUR MEDS BY ELIG CLIN: HCPCS | Performed by: NURSE PRACTITIONER

## 2021-09-27 RX ORDER — PREDNISONE 10 MG/1
TABLET ORAL
Qty: 30 TABLET | Refills: 0 | Status: SHIPPED | OUTPATIENT
Start: 2021-09-27 | End: 2022-02-01 | Stop reason: SDUPTHER

## 2021-09-27 RX ORDER — ALENDRONATE SODIUM 70 MG/1
70 TABLET ORAL
Qty: 12 TABLET | Refills: 1 | Status: SHIPPED | OUTPATIENT
Start: 2021-09-27 | End: 2022-03-08

## 2021-09-27 SDOH — ECONOMIC STABILITY: FOOD INSECURITY: WITHIN THE PAST 12 MONTHS, THE FOOD YOU BOUGHT JUST DIDN'T LAST AND YOU DIDN'T HAVE MONEY TO GET MORE.: PATIENT DECLINED

## 2021-09-27 SDOH — ECONOMIC STABILITY: FOOD INSECURITY: WITHIN THE PAST 12 MONTHS, YOU WORRIED THAT YOUR FOOD WOULD RUN OUT BEFORE YOU GOT MONEY TO BUY MORE.: PATIENT DECLINED

## 2021-09-27 ASSESSMENT — ENCOUNTER SYMPTOMS
WHEEZING: 1
NAUSEA: 0
COUGH: 1
ABDOMINAL PAIN: 0
SHORTNESS OF BREATH: 1
VOMITING: 0
RHINORRHEA: 0

## 2021-09-27 ASSESSMENT — SOCIAL DETERMINANTS OF HEALTH (SDOH): HOW HARD IS IT FOR YOU TO PAY FOR THE VERY BASICS LIKE FOOD, HOUSING, MEDICAL CARE, AND HEATING?: PATIENT DECLINED

## 2021-09-27 NOTE — PATIENT INSTRUCTIONS
Take calcium citrate with vitamin D 500-600 mg twice daily     Instructions on Fosamax use and side effects - particularly esophageal adverse events - are carefully reviewed with her. This drug must be taken upon arising for the day on an empty stomach, with a large 6-8 ounce glass of water; she must remain noting by mouth in the upright position for at least 30 minutes afterwards and until after the first food of the day. If esophageal irritation is noted, she will stop the drug and call my office.

## 2021-09-27 NOTE — PROGRESS NOTES
9/27/2021    This is a 78 y.o. female   Chief Complaint   Patient presents with    Results     Patient is here to review her dexa scan results from 9/7. Lexa DANIELS  Patient is here to review her most recent DEXA scan. Previously had osteopenia and now it has progressed to osteoporosis. She has not been taking calcium supplement. Has been taking vitamin D supplement. Positive for wheezing for 5-6 days. Sputum production is white or green. Using albuterol inhaler 3-4 times per day. Smoking about a pack a day. Using breo inhaler daily. Follows with pulmonologist Dr. Miranda Ingram. Denies fever. Previously been vaccinated against COVID-19. Denies known ill contacts.       Patient Active Problem List   Diagnosis    Osteopenia-last dexa 2009 repeat 2015 (-2.4 hip)--advised tx    Colon polyp, hyperplastic,-(done 3/11-repeat 3-5 yrs--dr Truong Pike)    Family history of colon cancer    CTS (carpal tunnel syndrome)BILATERAL-s/p surgical repair--resolved     Postmenopausal status-last mammogram 2/15 wnl last pap 12/13--wnl    Nondependent alcohol abuse, in remission    Urticaria, chronic    Tobacco abuse-advised to quit--lung cancer screening neg 5/18--repeat low dose ct 1 yr    Chronic back pain-(djd) saw dr Tunde Saeed afford surgery--seeing dr Jozef Be for pain meds    Fibromyalgia    Hypercholesteremia    Lumbar spondylosis    Vitamin D deficiency--severe--started 50,000 iu 2x/ wk 11/13    Insomnia--on elevil w help    Constipation--on daily miralax    Depression    Chronic pain syndrome    Muscle cramping    Gastroesophageal reflux disease    COPD, moderate ([address]    Chronic hypoxemic respiratory failure (HCC)    Degeneration of lumbar or lumbosacral intervertebral disc    Trochanteric bursitis of right hip    New onset type 2 diabetes mellitus ([address]    Controlled type 2 diabetes mellitus without complication, without long-term current use of insulin (HCC)    Age-related osteoporosis without current pathological fracture          Current Outpatient Medications   Medication Sig Dispense Refill    DULoxetine (CYMBALTA) 60 MG extended release capsule Take 1 capsule by mouth nightly 30 capsule 1    oxyCODONE-acetaminophen (PERCOCET) 7.5-325 MG per tablet Take 1 tablet by mouth every 6 hours as needed for Pain for up to 28 days. Max 4 a day 112 tablet 0    pantoprazole (PROTONIX) 40 MG tablet Take 1 tablet by mouth daily 30 tablet 1    gabapentin (NEURONTIN) 600 MG tablet Take 1 tablet by mouth 2 times daily for 30 days.  60 tablet 1    tiZANidine (ZANAFLEX) 4 MG tablet Take 1 tablet by mouth nightly 30 tablet 0    atorvastatin (LIPITOR) 80 MG tablet TAKE 1 TABLET BY MOUTH EVERY DAY FOR CHOLESTEROL 90 tablet 1    traZODone (DESYREL) 50 MG tablet Take 1-2 tablets by mouth nightly 60 tablet 1    meloxicam (MOBIC) 15 MG tablet TAKE 1 TABLET BY MOUTH DAILY 30 tablet 1    Fluticasone furoate-vilanterol (BREO ELLIPTA) 200-25 MCG/INH AEPB inhaler Inhale 1 puff into the lungs daily 1 each 11    albuterol sulfate  (90 Base) MCG/ACT inhaler Inhale 2 puffs into the lungs every 6 hours as needed for Shortness of Breath 1 Inhaler 11    Blood Glucose Monitoring Suppl (ONE TOUCH ULTRA MINI) w/Device KIT 1 kit by Does not apply route daily 1 kit 0    ONE TOUCH LANCETS MISC 1 each by Does not apply route daily 100 each 3    blood glucose test strips (ASCENSIA AUTODISC VI;ONE TOUCH ULTRA TEST VI) strip 1 each by In Vitro route daily 100 each 3    ipratropium-albuterol (DUONEB) 0.5-2.5 (3) MG/3ML SOLN nebulizer solution Take 1 aerosol every 4 hours for 2 days and then as needed for shortness of breath coughing and wheezing 360 mL 3    Cholecalciferol (VITAMIN D) 2000 units TABS tablet Take 1 tablet by mouth daily 30 tablet     cetirizine (ZYRTEC) 10 MG tablet Take 10 mg by mouth daily as needed for Allergies      Nebulizers (COMPRESSOR/NEBULIZER) MISC 1 Units by Does not apply route 4 times daily 1 each 3     No current facility-administered medications for this visit. No Known Allergies    Review of Systems   Constitutional: Positive for activity change and fatigue. Negative for fever. HENT: Negative for congestion, postnasal drip and rhinorrhea. Respiratory: Positive for cough, shortness of breath and wheezing. Cardiovascular: Negative for chest pain. Gastrointestinal: Negative for abdominal pain, nausea and vomiting. Neurological: Negative for dizziness and headaches. Vitals:    09/27/21 1001   BP: 138/80   Site: Right Upper Arm   Position: Sitting   Cuff Size: Large Adult   Pulse: 71   Resp: 16   Temp: 97.2 °F (36.2 °C)   TempSrc: Oral   SpO2: 95%   Weight: 139 lb (63 kg)   Height: 5' 4\" (1.626 m)       Body mass index is 23.86 kg/m². Wt Readings from Last 3 Encounters:   09/27/21 139 lb (63 kg)   08/31/21 138 lb (62.6 kg)   08/26/21 138 lb (62.6 kg)       BP Readings from Last 3 Encounters:   09/27/21 138/80   08/31/21 (!) 144/72   08/26/21 130/80       Physical Exam  Vitals and nursing note reviewed. Constitutional:       General: She is not in acute distress. Appearance: She is well-developed. HENT:      Head: Normocephalic and atraumatic. Eyes:      Extraocular Movements: Extraocular movements intact. Conjunctiva/sclera: Conjunctivae normal.   Cardiovascular:      Rate and Rhythm: Normal rate and regular rhythm. Heart sounds: Normal heart sounds. No murmur heard. No friction rub. No gallop. Pulmonary:      Effort: Pulmonary effort is normal. No respiratory distress. Breath sounds: Examination of the right-upper field reveals wheezing. Examination of the left-upper field reveals wheezing. Examination of the right-middle field reveals wheezing. Examination of the left-middle field reveals wheezing. Examination of the right-lower field reveals wheezing. Examination of the left-lower field reveals wheezing. Wheezing present.       Comments: Inspiratory and expiratory wheezing   Musculoskeletal:      Cervical back: Neck supple. Right lower leg: No edema. Left lower leg: No edema. Skin:     General: Skin is warm and dry. Neurological:      Mental Status: She is alert and oriented to person, place, and time. Psychiatric:         Behavior: Behavior normal.         Thought Content: Thought content normal.         Judgment: Judgment normal.         Assessmentand Plan  Joellen Thomas was seen today for results. Diagnoses and all orders for this visit:    Age-related osteoporosis without current pathological fracture  -     Vitamin D 25 Hydroxy; Future  -     alendronate (FOSAMAX) 70 MG tablet; Take 1 tablet by mouth every 7 days  -     Calcium Citrate-Vitamin D 500-500 MG-UNIT CHEW; Take 1 tablet by mouth 2 times daily  Start alendronate weekly. Side effects and how to take medication discussed. Advised to take calcium with vitamin D BID. She will take calcium citrate to help prevent worsening constipation. Advised weight bearing exercises daily   Advised smoking cessation. COPD, severe ([address]): has increased wheezing on exam. Has already been using her albuterol inhaler QID. Will treat with prednisone taper. She will continue with albuterol inhaler PRN. Continue with breo inhaler daily. Continue f/u with pulmonologist Dr. Evelyn Calderon.   -     predniSONE (DELTASONE) 10 MG tablet; Take 40 mg by mouth for 3 days 30 mg for 3 days 20 mg for 3 days 10 mg for 3 days. Patient is to call or go to ER if symptoms worsen or fail to improve within a couple of days. Smoking cessation discussed and needed. Need for influenza vaccination  -     INFLUENZA, QUADV, ADJUVANTED, 65 YRS =, IM, PF, PREFILL SYR, 0.5ML (FLUAD)        Return in about 22 weeks (around 2/28/2022), or if symptoms worsen or fail to improve, for chronic conditions.

## 2021-09-28 ENCOUNTER — OFFICE VISIT (OUTPATIENT)
Dept: PAIN MANAGEMENT | Age: 76
End: 2021-09-28
Payer: MEDICARE

## 2021-09-28 VITALS
HEIGHT: 64 IN | DIASTOLIC BLOOD PRESSURE: 82 MMHG | BODY MASS INDEX: 23.9 KG/M2 | WEIGHT: 140 LBS | RESPIRATION RATE: 16 BRPM | SYSTOLIC BLOOD PRESSURE: 158 MMHG | HEART RATE: 82 BPM

## 2021-09-28 DIAGNOSIS — M79.7 FIBROMYALGIA: ICD-10-CM

## 2021-09-28 DIAGNOSIS — M47.26 OSTEOARTHRITIS OF SPINE WITH RADICULOPATHY, LUMBAR REGION: ICD-10-CM

## 2021-09-28 DIAGNOSIS — M47.816 LUMBAR SPONDYLOSIS: ICD-10-CM

## 2021-09-28 DIAGNOSIS — G89.4 CHRONIC PAIN SYNDROME: ICD-10-CM

## 2021-09-28 DIAGNOSIS — M70.61 TROCHANTERIC BURSITIS OF RIGHT HIP: ICD-10-CM

## 2021-09-28 DIAGNOSIS — M51.36 DDD (DEGENERATIVE DISC DISEASE), LUMBAR: ICD-10-CM

## 2021-09-28 DIAGNOSIS — M51.37 DEGENERATION OF LUMBAR OR LUMBOSACRAL INTERVERTEBRAL DISC: ICD-10-CM

## 2021-09-28 PROCEDURE — 1123F ACP DISCUSS/DSCN MKR DOCD: CPT | Performed by: INTERNAL MEDICINE

## 2021-09-28 PROCEDURE — G8399 PT W/DXA RESULTS DOCUMENT: HCPCS | Performed by: INTERNAL MEDICINE

## 2021-09-28 PROCEDURE — 4040F PNEUMOC VAC/ADMIN/RCVD: CPT | Performed by: INTERNAL MEDICINE

## 2021-09-28 PROCEDURE — 4004F PT TOBACCO SCREEN RCVD TLK: CPT | Performed by: INTERNAL MEDICINE

## 2021-09-28 PROCEDURE — G8427 DOCREV CUR MEDS BY ELIG CLIN: HCPCS | Performed by: INTERNAL MEDICINE

## 2021-09-28 PROCEDURE — G8420 CALC BMI NORM PARAMETERS: HCPCS | Performed by: INTERNAL MEDICINE

## 2021-09-28 PROCEDURE — 1090F PRES/ABSN URINE INCON ASSESS: CPT | Performed by: INTERNAL MEDICINE

## 2021-09-28 PROCEDURE — 99213 OFFICE O/P EST LOW 20 MIN: CPT | Performed by: INTERNAL MEDICINE

## 2021-09-28 PROCEDURE — 3017F COLORECTAL CA SCREEN DOC REV: CPT | Performed by: INTERNAL MEDICINE

## 2021-09-28 RX ORDER — PANTOPRAZOLE SODIUM 40 MG/1
40 TABLET, DELAYED RELEASE ORAL DAILY
Qty: 30 TABLET | Refills: 1 | Status: SHIPPED | OUTPATIENT
Start: 2021-09-28 | End: 2021-10-26 | Stop reason: SDUPTHER

## 2021-09-28 RX ORDER — DULOXETIN HYDROCHLORIDE 60 MG/1
60 CAPSULE, DELAYED RELEASE ORAL NIGHTLY
Qty: 30 CAPSULE | Refills: 1 | Status: SHIPPED | OUTPATIENT
Start: 2021-09-28 | End: 2021-10-26 | Stop reason: SDUPTHER

## 2021-09-28 RX ORDER — PANTOPRAZOLE SODIUM 40 MG/1
TABLET, DELAYED RELEASE ORAL
Qty: 30 TABLET | Refills: 1 | OUTPATIENT
Start: 2021-09-28

## 2021-09-28 RX ORDER — TIZANIDINE 4 MG/1
4 TABLET ORAL NIGHTLY
Qty: 30 TABLET | Refills: 1 | Status: SHIPPED | OUTPATIENT
Start: 2021-09-28 | End: 2021-10-26 | Stop reason: SDUPTHER

## 2021-09-28 RX ORDER — GABAPENTIN 600 MG/1
600 TABLET ORAL 2 TIMES DAILY
Qty: 60 TABLET | Refills: 1 | Status: SHIPPED | OUTPATIENT
Start: 2021-09-28 | End: 2021-10-26 | Stop reason: SDUPTHER

## 2021-09-28 RX ORDER — OXYCODONE AND ACETAMINOPHEN 7.5; 325 MG/1; MG/1
1 TABLET ORAL EVERY 6 HOURS PRN
Qty: 112 TABLET | Refills: 0 | Status: SHIPPED | OUTPATIENT
Start: 2021-09-28 | End: 2021-10-26 | Stop reason: SDUPTHER

## 2021-09-28 RX ORDER — DULOXETIN HYDROCHLORIDE 60 MG/1
CAPSULE, DELAYED RELEASE ORAL
Qty: 30 CAPSULE | Refills: 1 | OUTPATIENT
Start: 2021-09-28

## 2021-09-28 NOTE — PROGRESS NOTES
Madelyn Hull  1945  [id number]      HISTORY OF PRESENT ILLNESS:  Ms. Imani Alexis is a 78 y.o. female returns for a follow up visit for pain management  She has a diagnosis of   1. Chronic pain syndrome    2. Osteoarthritis of spine with radiculopathy, lumbar region    3. Fibromyalgia    4. DDD (degenerative disc disease), lumbar    5. Lumbar spondylosis    6. Degeneration of lumbar or lumbosacral intervertebral disc    7. Trochanteric bursitis of right hip    . She complains of pain in the lower back  She rates the pain 6/10 and describes it as aching. Current treatment regimen has helped relieve about 40% of the pain. She denies any side effects from the current pain regimen. Patient reports that since the last follow up visit the physical functioning is unchanged, family/social relationships are unchanged, mood is unchanged sleep patterns are unchanged, and that the overall functioning is unchanged. Patient denies misusing/abusing her narcotic pain medications or using any illegal drugs. There are No indicators for possible drug abuse, addiction or diversion problems. Patient states she is doing fair, on Prednisone due to COPD issues, but getting better. She says she is using Percocet 4 per day and Zanaflex, which is helping with leg cramps. She reports she has been managing her ADLs. She says her breathing has been okay. ALLERGIES: Patients list of allergies were reviewed     MEDICATIONS: Ms. Imani Alexis list of medications were reviewed. Her current medications are   Outpatient Medications Prior to Visit   Medication Sig Dispense Refill    alendronate (FOSAMAX) 70 MG tablet Take 1 tablet by mouth every 7 days 12 tablet 1    Calcium Citrate-Vitamin D 500-500 MG-UNIT CHEW Take 1 tablet by mouth 2 times daily      predniSONE (DELTASONE) 10 MG tablet Take 40 mg by mouth for 3 days 30 mg for 3 days 20 mg for 3 days 10 mg for 3 days.  30 tablet 0    DULoxetine (CYMBALTA) 60 MG extended release capsule Take 1 capsule by mouth nightly 30 capsule 1    oxyCODONE-acetaminophen (PERCOCET) 7.5-325 MG per tablet Take 1 tablet by mouth every 6 hours as needed for Pain for up to 28 days. Max 4 a day 112 tablet 0    pantoprazole (PROTONIX) 40 MG tablet Take 1 tablet by mouth daily 30 tablet 1    gabapentin (NEURONTIN) 600 MG tablet Take 1 tablet by mouth 2 times daily for 30 days. 60 tablet 1    tiZANidine (ZANAFLEX) 4 MG tablet Take 1 tablet by mouth nightly 30 tablet 0    atorvastatin (LIPITOR) 80 MG tablet TAKE 1 TABLET BY MOUTH EVERY DAY FOR CHOLESTEROL 90 tablet 1    traZODone (DESYREL) 50 MG tablet Take 1-2 tablets by mouth nightly 60 tablet 1    meloxicam (MOBIC) 15 MG tablet TAKE 1 TABLET BY MOUTH DAILY 30 tablet 1    Fluticasone furoate-vilanterol (BREO ELLIPTA) 200-25 MCG/INH AEPB inhaler Inhale 1 puff into the lungs daily 1 each 11    albuterol sulfate  (90 Base) MCG/ACT inhaler Inhale 2 puffs into the lungs every 6 hours as needed for Shortness of Breath 1 Inhaler 11    Blood Glucose Monitoring Suppl (ONE TOUCH ULTRA MINI) w/Device KIT 1 kit by Does not apply route daily 1 kit 0    ONE TOUCH LANCETS MISC 1 each by Does not apply route daily 100 each 3    blood glucose test strips (ASCENSIA AUTODISC VI;ONE TOUCH ULTRA TEST VI) strip 1 each by In Vitro route daily 100 each 3    ipratropium-albuterol (DUONEB) 0.5-2.5 (3) MG/3ML SOLN nebulizer solution Take 1 aerosol every 4 hours for 2 days and then as needed for shortness of breath coughing and wheezing 360 mL 3    Cholecalciferol (VITAMIN D) 2000 units TABS tablet Take 1 tablet by mouth daily 30 tablet     cetirizine (ZYRTEC) 10 MG tablet Take 10 mg by mouth daily as needed for Allergies      Nebulizers (COMPRESSOR/NEBULIZER) MISC 1 Units by Does not apply route 4 times daily 1 each 3     No facility-administered medications prior to visit.         REVIEW OF SYSTEMS:    Respiratory: Negative for apnea, chest tightness and shortness of breath for 3 days 30 mg for 3 days 20 mg for 3 days 10 mg for 3 days. 30 tablet 0    DULoxetine (CYMBALTA) 60 MG extended release capsule Take 1 capsule by mouth nightly 30 capsule 1    oxyCODONE-acetaminophen (PERCOCET) 7.5-325 MG per tablet Take 1 tablet by mouth every 6 hours as needed for Pain for up to 28 days. Max 4 a day 112 tablet 0    pantoprazole (PROTONIX) 40 MG tablet Take 1 tablet by mouth daily 30 tablet 1    gabapentin (NEURONTIN) 600 MG tablet Take 1 tablet by mouth 2 times daily for 30 days.  60 tablet 1    tiZANidine (ZANAFLEX) 4 MG tablet Take 1 tablet by mouth nightly 30 tablet 0    atorvastatin (LIPITOR) 80 MG tablet TAKE 1 TABLET BY MOUTH EVERY DAY FOR CHOLESTEROL 90 tablet 1    traZODone (DESYREL) 50 MG tablet Take 1-2 tablets by mouth nightly 60 tablet 1    meloxicam (MOBIC) 15 MG tablet TAKE 1 TABLET BY MOUTH DAILY 30 tablet 1    Fluticasone furoate-vilanterol (BREO ELLIPTA) 200-25 MCG/INH AEPB inhaler Inhale 1 puff into the lungs daily 1 each 11    albuterol sulfate  (90 Base) MCG/ACT inhaler Inhale 2 puffs into the lungs every 6 hours as needed for Shortness of Breath 1 Inhaler 11    Blood Glucose Monitoring Suppl (ONE TOUCH ULTRA MINI) w/Device KIT 1 kit by Does not apply route daily 1 kit 0    ONE TOUCH LANCETS MISC 1 each by Does not apply route daily 100 each 3    blood glucose test strips (ASCENSIA AUTODISC VI;ONE TOUCH ULTRA TEST VI) strip 1 each by In Vitro route daily 100 each 3    ipratropium-albuterol (DUONEB) 0.5-2.5 (3) MG/3ML SOLN nebulizer solution Take 1 aerosol every 4 hours for 2 days and then as needed for shortness of breath coughing and wheezing 360 mL 3    Cholecalciferol (VITAMIN D) 2000 units TABS tablet Take 1 tablet by mouth daily 30 tablet     cetirizine (ZYRTEC) 10 MG tablet Take 10 mg by mouth daily as needed for Allergies      Nebulizers (COMPRESSOR/NEBULIZER) MISC 1 Units by Does not apply route 4 times daily 1 each 3     No current facility-administered medications for this visit. I will continue her current medication regimen  which is part of the above treatment schedule. It has been helping with Ms. Tiffany Yo chronic  medical problems which for this visit include:   Diagnoses of Chronic pain syndrome, Osteoarthritis of spine with radiculopathy, lumbar region, Fibromyalgia, DDD (degenerative disc disease), lumbar, Lumbar spondylosis, Degeneration of lumbar or lumbosacral intervertebral disc, and Trochanteric bursitis of right hip were pertinent to this visit. Risks and benefits of the medications and other alternative treatments  including no treatment were discussed with the patient. The common side effects of these medications were also explained to the patient. Informed verbal consent was obtained. Goals of current treatment regimen include improvement in pain, restoration of functioning- with focus on improvement in physical performance, general activity, work or disability,emotional distress, health care utilization and  decreased medication consumption. Will continue to monitor progress towards achieving/maintaining therapeutic goals with special emphasis on  1. Improvement in perceived interfernce  of pain with ADL's. Ability to do home exercises independently. Ability to do household chores indoor and/or outdoor work and social and leisure activities. Improve psychosocial and physical functioning. - she is showing progression towards this treatment goal with the current regimen. She was advised against drinking alcohol with the narcotic pain medicines, advised against driving or handling machinery while adjusting the dose of medicines or if having cognitive  issues related to the current medications. Risk of overdose and death, if medicines not taken as prescribed, were also discussed. If the patient develops new symptoms or if the symptoms worsen, the patient should call the office.     While transcribing every attempt was made to maintain the accuracy of the note in terms of it's contents,there may have been some errors made inadvertently. Thank you for allowing me to participate in the care of this patient.     Eder Horan MD.    Cc: HECTOR Stahl - CNP

## 2021-10-17 DIAGNOSIS — M47.26 OSTEOARTHRITIS OF SPINE WITH RADICULOPATHY, LUMBAR REGION: ICD-10-CM

## 2021-10-17 DIAGNOSIS — M51.36 DDD (DEGENERATIVE DISC DISEASE), LUMBAR: ICD-10-CM

## 2021-10-17 DIAGNOSIS — M51.37 DEGENERATION OF LUMBAR OR LUMBOSACRAL INTERVERTEBRAL DISC: ICD-10-CM

## 2021-10-17 DIAGNOSIS — M79.7 FIBROMYALGIA: ICD-10-CM

## 2021-10-17 DIAGNOSIS — G89.4 CHRONIC PAIN SYNDROME: ICD-10-CM

## 2021-10-17 DIAGNOSIS — M47.816 LUMBAR SPONDYLOSIS: ICD-10-CM

## 2021-10-17 DIAGNOSIS — M70.61 TROCHANTERIC BURSITIS OF RIGHT HIP: ICD-10-CM

## 2021-10-20 DIAGNOSIS — G89.4 CHRONIC PAIN SYNDROME: ICD-10-CM

## 2021-10-26 ENCOUNTER — OFFICE VISIT (OUTPATIENT)
Dept: PAIN MANAGEMENT | Age: 76
End: 2021-10-26
Payer: MEDICARE

## 2021-10-26 VITALS
WEIGHT: 141 LBS | BODY MASS INDEX: 24.2 KG/M2 | DIASTOLIC BLOOD PRESSURE: 84 MMHG | HEART RATE: 64 BPM | SYSTOLIC BLOOD PRESSURE: 138 MMHG

## 2021-10-26 DIAGNOSIS — G89.4 CHRONIC PAIN SYNDROME: ICD-10-CM

## 2021-10-26 DIAGNOSIS — M70.61 TROCHANTERIC BURSITIS OF RIGHT HIP: ICD-10-CM

## 2021-10-26 DIAGNOSIS — M51.36 DDD (DEGENERATIVE DISC DISEASE), LUMBAR: ICD-10-CM

## 2021-10-26 DIAGNOSIS — M79.7 FIBROMYALGIA: ICD-10-CM

## 2021-10-26 DIAGNOSIS — M47.26 OSTEOARTHRITIS OF SPINE WITH RADICULOPATHY, LUMBAR REGION: ICD-10-CM

## 2021-10-26 DIAGNOSIS — M51.37 DEGENERATION OF LUMBAR OR LUMBOSACRAL INTERVERTEBRAL DISC: ICD-10-CM

## 2021-10-26 DIAGNOSIS — M47.816 LUMBAR SPONDYLOSIS: ICD-10-CM

## 2021-10-26 PROCEDURE — 3017F COLORECTAL CA SCREEN DOC REV: CPT | Performed by: INTERNAL MEDICINE

## 2021-10-26 PROCEDURE — 4040F PNEUMOC VAC/ADMIN/RCVD: CPT | Performed by: INTERNAL MEDICINE

## 2021-10-26 PROCEDURE — 1123F ACP DISCUSS/DSCN MKR DOCD: CPT | Performed by: INTERNAL MEDICINE

## 2021-10-26 PROCEDURE — G8420 CALC BMI NORM PARAMETERS: HCPCS | Performed by: INTERNAL MEDICINE

## 2021-10-26 PROCEDURE — 4004F PT TOBACCO SCREEN RCVD TLK: CPT | Performed by: INTERNAL MEDICINE

## 2021-10-26 PROCEDURE — G8427 DOCREV CUR MEDS BY ELIG CLIN: HCPCS | Performed by: INTERNAL MEDICINE

## 2021-10-26 PROCEDURE — G8399 PT W/DXA RESULTS DOCUMENT: HCPCS | Performed by: INTERNAL MEDICINE

## 2021-10-26 PROCEDURE — G8484 FLU IMMUNIZE NO ADMIN: HCPCS | Performed by: INTERNAL MEDICINE

## 2021-10-26 PROCEDURE — 99213 OFFICE O/P EST LOW 20 MIN: CPT | Performed by: INTERNAL MEDICINE

## 2021-10-26 PROCEDURE — 1090F PRES/ABSN URINE INCON ASSESS: CPT | Performed by: INTERNAL MEDICINE

## 2021-10-26 RX ORDER — PANTOPRAZOLE SODIUM 40 MG/1
40 TABLET, DELAYED RELEASE ORAL DAILY
Qty: 30 TABLET | Refills: 1 | Status: SHIPPED | OUTPATIENT
Start: 2021-10-26 | End: 2021-11-23 | Stop reason: SDUPTHER

## 2021-10-26 RX ORDER — MELOXICAM 15 MG/1
TABLET ORAL
Qty: 30 TABLET | Refills: 1 | OUTPATIENT
Start: 2021-10-26

## 2021-10-26 RX ORDER — MELOXICAM 15 MG/1
TABLET ORAL
Qty: 30 TABLET | Refills: 1 | Status: SHIPPED | OUTPATIENT
Start: 2021-10-26 | End: 2021-11-23 | Stop reason: SDUPTHER

## 2021-10-26 RX ORDER — PANTOPRAZOLE SODIUM 40 MG/1
TABLET, DELAYED RELEASE ORAL
Qty: 30 TABLET | Refills: 1 | OUTPATIENT
Start: 2021-10-26

## 2021-10-26 RX ORDER — GABAPENTIN 600 MG/1
600 TABLET ORAL 2 TIMES DAILY
Qty: 60 TABLET | Refills: 1 | Status: SHIPPED | OUTPATIENT
Start: 2021-10-26 | End: 2021-11-23 | Stop reason: SDUPTHER

## 2021-10-26 RX ORDER — TIZANIDINE 4 MG/1
TABLET ORAL
Qty: 30 TABLET | Refills: 1 | OUTPATIENT
Start: 2021-10-26

## 2021-10-26 RX ORDER — TIZANIDINE 4 MG/1
4 TABLET ORAL NIGHTLY
Qty: 30 TABLET | Refills: 1 | Status: SHIPPED | OUTPATIENT
Start: 2021-10-26 | End: 2021-11-23 | Stop reason: SDUPTHER

## 2021-10-26 RX ORDER — DULOXETIN HYDROCHLORIDE 60 MG/1
60 CAPSULE, DELAYED RELEASE ORAL NIGHTLY
Qty: 30 CAPSULE | Refills: 1 | Status: SHIPPED | OUTPATIENT
Start: 2021-10-26 | End: 2021-11-23 | Stop reason: SDUPTHER

## 2021-10-26 RX ORDER — TRAZODONE HYDROCHLORIDE 50 MG/1
50-100 TABLET ORAL NIGHTLY
Qty: 60 TABLET | Refills: 1 | Status: SHIPPED | OUTPATIENT
Start: 2021-10-26 | End: 2021-11-23 | Stop reason: SDUPTHER

## 2021-10-26 RX ORDER — OXYCODONE AND ACETAMINOPHEN 7.5; 325 MG/1; MG/1
1 TABLET ORAL EVERY 6 HOURS PRN
Qty: 112 TABLET | Refills: 0 | Status: SHIPPED | OUTPATIENT
Start: 2021-10-26 | End: 2021-11-23 | Stop reason: SDUPTHER

## 2021-10-26 NOTE — PROGRESS NOTES
[facility]  1945  [id number]    HISTORY OF PRESENT ILLNESS:  Ms. Angus Stewart is a 78 y.o. female returns for a follow up visit for multiple medical problems. Her current presenting problems are   1. Chronic pain syndrome    2. Fibromyalgia    3. Osteoarthritis of spine with radiculopathy, lumbar region    4. DDD (degenerative disc disease), lumbar    5. Lumbar spondylosis    6. Degeneration of lumbar or lumbosacral intervertebral disc    7. Trochanteric bursitis of right hip    8. Gastroesophageal reflux disease without esophagitis    9. Moderate episode of recurrent major depressive disorder ([facility] [address]    10. Primary insomnia    . As per information/history obtained from the PADT(patient assessment and documentation tool) - She complains of pain in the lower back with radiation to the buttocks, hips Bilateral, upper leg Bilateral, knees Bilateral, lower leg Bilateral, ankles Bilateral and feet Bilateral She rates the pain 8/10 and describes it as sharp, aching. Pain is made worse by: standing. Current treatment regimen has helped relieve about 30% of the pain. She denies side effects from the current pain regimen. Patient reports that since the last follow up visit the physical functioning is worse, family/social relationships are unchanged, mood is unchanged and sleep patterns are unchanged, and that the overall functioning is worse. Patient denies neurological bowel or bladder. Patient denies misusing/abusing her narcotic pain medications or using any illegal drugs. There are No indicators for possible drug abuse, addiction or diversion problems. Upon obtaining the medical history from Ms. Angus Stewart regarding today's office visit for her presenting problems, patient states the last 5 days she has been hurting more, she could hardly stand but is getting slightly better. Ms. Angus Stewart mentions she is using Mobic along with Neurontin. Patient denies any side effects with the medications.  She complains of increased pain with changes in weather. Extreme temperatures- cold and damp weather causes increased pain. Patient reports she has been trying to stay active around the house. ALLERGIES: Patients list of allergies were reviewed     MEDICATIONS: Ms. Kvng Green list of medications were reviewed. Her current medications are   Outpatient Medications Prior to Visit   Medication Sig Dispense Refill    DULoxetine (CYMBALTA) 60 MG extended release capsule Take 1 capsule by mouth nightly 30 capsule 1    oxyCODONE-acetaminophen (PERCOCET) 7.5-325 MG per tablet Take 1 tablet by mouth every 6 hours as needed for Pain for up to 28 days. Max 4 a day 112 tablet 0    pantoprazole (PROTONIX) 40 MG tablet Take 1 tablet by mouth daily 30 tablet 1    tiZANidine (ZANAFLEX) 4 MG tablet Take 1 tablet by mouth nightly 30 tablet 1    gabapentin (NEURONTIN) 600 MG tablet Take 1 tablet by mouth 2 times daily for 30 days.  60 tablet 1    alendronate (FOSAMAX) 70 MG tablet Take 1 tablet by mouth every 7 days 12 tablet 1    Calcium Citrate-Vitamin D 500-500 MG-UNIT CHEW Take 1 tablet by mouth 2 times daily      atorvastatin (LIPITOR) 80 MG tablet TAKE 1 TABLET BY MOUTH EVERY DAY FOR CHOLESTEROL 90 tablet 1    traZODone (DESYREL) 50 MG tablet Take 1-2 tablets by mouth nightly 60 tablet 1    meloxicam (MOBIC) 15 MG tablet TAKE 1 TABLET BY MOUTH DAILY 30 tablet 1    Fluticasone furoate-vilanterol (BREO ELLIPTA) 200-25 MCG/INH AEPB inhaler Inhale 1 puff into the lungs daily 1 each 11    albuterol sulfate  (90 Base) MCG/ACT inhaler Inhale 2 puffs into the lungs every 6 hours as needed for Shortness of Breath 1 Inhaler 11    Blood Glucose Monitoring Suppl (ONE TOUCH ULTRA MINI) w/Device KIT 1 kit by Does not apply route daily 1 kit 0    ONE TOUCH LANCETS MISC 1 each by Does not apply route daily 100 each 3    blood glucose test strips (ASCENSIA AUTODISC VI;ONE TOUCH ULTRA TEST VI) strip 1 each by In Vitro route daily 100 each 3  ipratropium-albuterol (DUONEB) 0.5-2.5 (3) MG/3ML SOLN nebulizer solution Take 1 aerosol every 4 hours for 2 days and then as needed for shortness of breath coughing and wheezing 360 mL 3    Cholecalciferol (VITAMIN D) 2000 units TABS tablet Take 1 tablet by mouth daily 30 tablet     cetirizine (ZYRTEC) 10 MG tablet Take 10 mg by mouth daily as needed for Allergies      Nebulizers (COMPRESSOR/NEBULIZER) MISC 1 Units by Does not apply route 4 times daily 1 each 3     No facility-administered medications prior to visit. REVIEW OF SYSTEMS:    Respiratory: Negative for apnea, chest tightness and shortness of breath or change in baseline breathing. PHYSICAL EXAM:   Nursing note and vitals reviewed. /84   Pulse 64   Wt 141 lb (64 kg)   BMI 24.20 kg/m²   Constitutional: She appears well-developed and well-nourished. No acute distress. Cardiovascular: Normal rate, regular rhythm, normal heart sounds, and does not have murmur. Pulmonary/Chest: Effort normal. No respiratory distress. She does not have wheezes in the lung fields. She has no rales. Decreased air exchange. Neurological/Psychiatric:She is alert and oriented to person, place, and time. Coordination is  normal.  Her mood isAppropriate and affect is Neutral/Euthymic(normal) . Her    IMPRESSION:   1. Chronic pain syndrome    2. Fibromyalgia    3. Osteoarthritis of spine with radiculopathy, lumbar region    4. DDD (degenerative disc disease), lumbar    5. Lumbar spondylosis    6. Degeneration of lumbar or lumbosacral intervertebral disc    7. Trochanteric bursitis of right hip        PLAN:  Informed verbal consent was obtained  -[place] was obtained and reviewed  for the last one year and no indicators of drug misuse  were found. Any other controlled substance prescriptions  seen on the record have been accounted for, I am aware of the patient receiving these medications. Camilla Obrien  [place] record will be rechecked as part of office protocol. -ROM/Stretching exercises as advised   -She was advised to increase fluids ( 5-7  glasses of fluid daily), limit caffeine, avoid cheese products, increase dietary fiber, increase activity and exercise as tolerated and relax regularly and enjoy meals   -Continue with Percocet 4 per day  -Walking as tolerated   -Advised patient to quit smoking for  health related concerns and to improve the treatment outcomes. Education was given on quitting smoking and the use of different modalities including medications, hypnotherapy, counselling  and biofeedback. These were discussed with patient. Current Outpatient Medications   Medication Sig Dispense Refill    DULoxetine (CYMBALTA) 60 MG extended release capsule Take 1 capsule by mouth nightly 30 capsule 1    oxyCODONE-acetaminophen (PERCOCET) 7.5-325 MG per tablet Take 1 tablet by mouth every 6 hours as needed for Pain for up to 28 days. Max 4 a day 112 tablet 0    pantoprazole (PROTONIX) 40 MG tablet Take 1 tablet by mouth daily 30 tablet 1    tiZANidine (ZANAFLEX) 4 MG tablet Take 1 tablet by mouth nightly 30 tablet 1    gabapentin (NEURONTIN) 600 MG tablet Take 1 tablet by mouth 2 times daily for 30 days.  60 tablet 1    alendronate (FOSAMAX) 70 MG tablet Take 1 tablet by mouth every 7 days 12 tablet 1    Calcium Citrate-Vitamin D 500-500 MG-UNIT CHEW Take 1 tablet by mouth 2 times daily      atorvastatin (LIPITOR) 80 MG tablet TAKE 1 TABLET BY MOUTH EVERY DAY FOR CHOLESTEROL 90 tablet 1    traZODone (DESYREL) 50 MG tablet Take 1-2 tablets by mouth nightly 60 tablet 1    meloxicam (MOBIC) 15 MG tablet TAKE 1 TABLET BY MOUTH DAILY 30 tablet 1    Fluticasone furoate-vilanterol (BREO ELLIPTA) 200-25 MCG/INH AEPB inhaler Inhale 1 puff into the lungs daily 1 each 11    albuterol sulfate  (90 Base) MCG/ACT inhaler Inhale 2 puffs into the lungs every 6 hours as needed for Shortness of Breath 1 Inhaler 11    Blood Glucose Monitoring Suppl (ONE TOUCH ULTRA MINI) w/Device KIT 1 kit by Does not apply route daily 1 kit 0    ONE TOUCH LANCETS MISC 1 each by Does not apply route daily 100 each 3    blood glucose test strips (ASCENSIA AUTODISC VI;ONE TOUCH ULTRA TEST VI) strip 1 each by In Vitro route daily 100 each 3    ipratropium-albuterol (DUONEB) 0.5-2.5 (3) MG/3ML SOLN nebulizer solution Take 1 aerosol every 4 hours for 2 days and then as needed for shortness of breath coughing and wheezing 360 mL 3    Cholecalciferol (VITAMIN D) 2000 units TABS tablet Take 1 tablet by mouth daily 30 tablet     cetirizine (ZYRTEC) 10 MG tablet Take 10 mg by mouth daily as needed for Allergies      Nebulizers (COMPRESSOR/NEBULIZER) MISC 1 Units by Does not apply route 4 times daily 1 each 3     No current facility-administered medications for this visit. I will continue her current medication regimen  which is part of the above treatment schedule. It has been helping with Ms. Nelson Fernandez chronic  medical problems which for this visit include:   Diagnoses of Chronic pain syndrome, Fibromyalgia, Osteoarthritis of spine with radiculopathy, lumbar region, DDD (degenerative disc disease), lumbar, Lumbar spondylosis, Degeneration of lumbar or lumbosacral intervertebral disc, Trochanteric bursitis of right hip, Gastroesophageal reflux disease without esophagitis, Moderate episode of recurrent major depressive disorder ([address] and Primary insomnia were pertinent to this visit. Risks and benefits of the medications and other alternative treatments  including no treatment were discussed with the patient. The common side effects of these medications were also explained to the patient. Informed verbal consent was obtained.    Goals of current treatment regimen include improvement in pain, restoration of functioning- with focus on improvement in physical performance, general activity, work or disability,emotional distress, health care utilization and  decreased medication consumption. Will continue to monitor progress towards achieving/maintaining therapeutic goals with special emphasis on  1. Improvement in perceived interfernce  of pain with ADL's. Ability to do home exercises independently. Ability to do household chores indoor and/or outdoor work and social and leisure activities. Improve psychosocial and physical functioning. - she is showing progression towards this treatment goal with the current regimen. She was advised against drinking alcohol with the narcotic pain medicines, advised against driving or handling machinery while adjusting the dose of medicines or if having cognitive  issues related to the current medications. Risk of overdose and death, if medicines not taken as prescribed, were also discussed. If the patient develops new symptoms or if the symptoms worsen, the patient should call the office. While transcribing every attempt was made to maintain the accuracy of the note in terms of it's contents,there may have been some errors made inadvertently. Thank you for allowing me to participate in the care of this patient.     Sarbjit Mariscal MD.    Cc: Maddy Javier, HECTOR - CNP

## 2021-11-23 ENCOUNTER — OFFICE VISIT (OUTPATIENT)
Dept: PAIN MANAGEMENT | Age: 76
End: 2021-11-23
Payer: MEDICARE

## 2021-11-23 VITALS
DIASTOLIC BLOOD PRESSURE: 77 MMHG | OXYGEN SATURATION: 89 % | HEART RATE: 81 BPM | WEIGHT: 138 LBS | SYSTOLIC BLOOD PRESSURE: 168 MMHG | BODY MASS INDEX: 23.69 KG/M2

## 2021-11-23 DIAGNOSIS — M51.37 DEGENERATION OF LUMBAR OR LUMBOSACRAL INTERVERTEBRAL DISC: ICD-10-CM

## 2021-11-23 DIAGNOSIS — M79.7 FIBROMYALGIA: ICD-10-CM

## 2021-11-23 DIAGNOSIS — G89.4 CHRONIC PAIN SYNDROME: ICD-10-CM

## 2021-11-23 DIAGNOSIS — M47.816 LUMBAR SPONDYLOSIS: ICD-10-CM

## 2021-11-23 DIAGNOSIS — M70.61 TROCHANTERIC BURSITIS OF RIGHT HIP: ICD-10-CM

## 2021-11-23 DIAGNOSIS — M51.36 DDD (DEGENERATIVE DISC DISEASE), LUMBAR: ICD-10-CM

## 2021-11-23 DIAGNOSIS — M47.26 OSTEOARTHRITIS OF SPINE WITH RADICULOPATHY, LUMBAR REGION: ICD-10-CM

## 2021-11-23 PROCEDURE — 4004F PT TOBACCO SCREEN RCVD TLK: CPT | Performed by: INTERNAL MEDICINE

## 2021-11-23 PROCEDURE — 3017F COLORECTAL CA SCREEN DOC REV: CPT | Performed by: INTERNAL MEDICINE

## 2021-11-23 PROCEDURE — G8427 DOCREV CUR MEDS BY ELIG CLIN: HCPCS | Performed by: INTERNAL MEDICINE

## 2021-11-23 PROCEDURE — G8484 FLU IMMUNIZE NO ADMIN: HCPCS | Performed by: INTERNAL MEDICINE

## 2021-11-23 PROCEDURE — G8399 PT W/DXA RESULTS DOCUMENT: HCPCS | Performed by: INTERNAL MEDICINE

## 2021-11-23 PROCEDURE — 4040F PNEUMOC VAC/ADMIN/RCVD: CPT | Performed by: INTERNAL MEDICINE

## 2021-11-23 PROCEDURE — 1090F PRES/ABSN URINE INCON ASSESS: CPT | Performed by: INTERNAL MEDICINE

## 2021-11-23 PROCEDURE — 1123F ACP DISCUSS/DSCN MKR DOCD: CPT | Performed by: INTERNAL MEDICINE

## 2021-11-23 PROCEDURE — G8420 CALC BMI NORM PARAMETERS: HCPCS | Performed by: INTERNAL MEDICINE

## 2021-11-23 PROCEDURE — 99213 OFFICE O/P EST LOW 20 MIN: CPT | Performed by: INTERNAL MEDICINE

## 2021-11-23 RX ORDER — PANTOPRAZOLE SODIUM 40 MG/1
40 TABLET, DELAYED RELEASE ORAL DAILY
Qty: 30 TABLET | Refills: 1 | Status: SHIPPED | OUTPATIENT
Start: 2021-11-23 | End: 2021-12-21 | Stop reason: SDUPTHER

## 2021-11-23 RX ORDER — TRAZODONE HYDROCHLORIDE 50 MG/1
50-100 TABLET ORAL NIGHTLY
Qty: 60 TABLET | Refills: 1 | Status: SHIPPED | OUTPATIENT
Start: 2021-11-23 | End: 2022-01-18 | Stop reason: SDUPTHER

## 2021-11-23 RX ORDER — DULOXETIN HYDROCHLORIDE 60 MG/1
60 CAPSULE, DELAYED RELEASE ORAL NIGHTLY
Qty: 30 CAPSULE | Refills: 1 | Status: SHIPPED | OUTPATIENT
Start: 2021-11-23 | End: 2022-01-18 | Stop reason: SDUPTHER

## 2021-11-23 RX ORDER — OXYCODONE AND ACETAMINOPHEN 7.5; 325 MG/1; MG/1
1 TABLET ORAL EVERY 6 HOURS PRN
Qty: 112 TABLET | Refills: 0 | Status: SHIPPED | OUTPATIENT
Start: 2021-11-23 | End: 2021-12-21 | Stop reason: SDUPTHER

## 2021-11-23 RX ORDER — TIZANIDINE 4 MG/1
4 TABLET ORAL NIGHTLY
Qty: 30 TABLET | Refills: 1 | Status: SHIPPED | OUTPATIENT
Start: 2021-11-23 | End: 2021-12-21 | Stop reason: SDUPTHER

## 2021-11-23 RX ORDER — MELOXICAM 15 MG/1
TABLET ORAL
Qty: 30 TABLET | Refills: 1 | Status: SHIPPED | OUTPATIENT
Start: 2021-11-23 | End: 2022-01-18 | Stop reason: SDUPTHER

## 2021-11-23 RX ORDER — GABAPENTIN 600 MG/1
600 TABLET ORAL 2 TIMES DAILY
Qty: 60 TABLET | Refills: 1 | Status: SHIPPED | OUTPATIENT
Start: 2021-11-23 | End: 2022-01-18 | Stop reason: SDUPTHER

## 2021-11-23 NOTE — PROGRESS NOTES
Aurora Mc  1945  [id number]    HISTORY OF PRESENT ILLNESS:  Ms. Kevin Willams is a 78 y.o. female returns for a follow up visit for multiple medical problems. Her current presenting problems are   1. Osteoarthritis of spine with radiculopathy, lumbar region    2. Chronic pain syndrome    3. Lumbar spondylosis    4. Trochanteric bursitis of right hip    5. Fibromyalgia    6. DDD (degenerative disc disease), lumbar    7. Degeneration of lumbar or lumbosacral intervertebral disc    8. Gastroesophageal reflux disease without esophagitis    9. Muscle cramping    . As per information/history obtained from the PADT(patient assessment and documentation tool) - She complains of pain in the lower back with radiation to the hips Right She rates the pain 6/10 and describes it as aching. Pain is made worse by: standing. Current treatment regimen has helped relieve about 40% of the pain. She denies side effects from the current pain regimen. Patient reports that since the last follow up visit the physical functioning is unchanged, family/social relationships are unchanged, mood is unchanged and sleep patterns are unchanged, and that the overall functioning is unchanged. Patient denies neurological bowel or bladder. Patient denies misusing/abusing her narcotic pain medications or using any illegal drugs. There are No indicators for possible drug abuse, addiction or diversion problems. Upon obtaining the medical history from Ms. Kevin Willams regarding today's office visit for her presenting problems, patient states she is doing better since her last visit. She mentions she has been complaint with her regimen. She denies any side effects. She says she is using Percocet 4 per day. She denies any constipation symptoms. She reports her breathing has been okay. ALLERGIES: Patients list of allergies were reviewed     MEDICATIONS: Ms. Kevin Willams list of medications were reviewed. Her current medications are   Outpatient Medications Prior to Visit   Medication Sig Dispense Refill    DULoxetine (CYMBALTA) 60 MG extended release capsule Take 1 capsule by mouth nightly 30 capsule 1    oxyCODONE-acetaminophen (PERCOCET) 7.5-325 MG per tablet Take 1 tablet by mouth every 6 hours as needed for Pain for up to 28 days. Max 4 a day 112 tablet 0    pantoprazole (PROTONIX) 40 MG tablet Take 1 tablet by mouth daily 30 tablet 1    tiZANidine (ZANAFLEX) 4 MG tablet Take 1 tablet by mouth nightly 30 tablet 1    gabapentin (NEURONTIN) 600 MG tablet Take 1 tablet by mouth 2 times daily for 30 days.  60 tablet 1    traZODone (DESYREL) 50 MG tablet Take 1-2 tablets by mouth nightly 60 tablet 1    meloxicam (MOBIC) 15 MG tablet TAKE 1 TABLET BY MOUTH DAILY 30 tablet 1    alendronate (FOSAMAX) 70 MG tablet Take 1 tablet by mouth every 7 days 12 tablet 1    Calcium Citrate-Vitamin D 500-500 MG-UNIT CHEW Take 1 tablet by mouth 2 times daily      atorvastatin (LIPITOR) 80 MG tablet TAKE 1 TABLET BY MOUTH EVERY DAY FOR CHOLESTEROL 90 tablet 1    Fluticasone furoate-vilanterol (BREO ELLIPTA) 200-25 MCG/INH AEPB inhaler Inhale 1 puff into the lungs daily 1 each 11    albuterol sulfate  (90 Base) MCG/ACT inhaler Inhale 2 puffs into the lungs every 6 hours as needed for Shortness of Breath 1 Inhaler 11    Blood Glucose Monitoring Suppl (ONE TOUCH ULTRA MINI) w/Device KIT 1 kit by Does not apply route daily 1 kit 0    ONE TOUCH LANCETS MISC 1 each by Does not apply route daily 100 each 3    blood glucose test strips (ASCENSIA AUTODISC VI;ONE TOUCH ULTRA TEST VI) strip 1 each by In Vitro route daily 100 each 3    ipratropium-albuterol (DUONEB) 0.5-2.5 (3) MG/3ML SOLN nebulizer solution Take 1 aerosol every 4 hours for 2 days and then as needed for shortness of breath coughing and wheezing 360 mL 3    Cholecalciferol (VITAMIN D) 2000 units TABS tablet Take 1 tablet by mouth daily 30 tablet     cetirizine (ZYRTEC) 10 MG tablet Take 10 mg by mouth daily as needed for Allergies      Nebulizers (COMPRESSOR/NEBULIZER) MISC 1 Units by Does not apply route 4 times daily 1 each 3     No facility-administered medications prior to visit. REVIEW OF SYSTEMS:    Respiratory: Negative for apnea, chest tightness and shortness of breath or change in baseline breathing. PHYSICAL EXAM:   Nursing note and vitals reviewed. BP (!) 168/77   Pulse 81   Wt 138 lb (62.6 kg)   SpO2 (!) 89%   BMI 23.69 kg/m²   Constitutional: She appears well-developed and well-nourished. No acute distress. Cardiovascular: Normal rate, regular rhythm, normal heart sounds, and does not have murmur. Pulmonary/Chest: Effort normal. No respiratory distress. She haswheezes in the lung fields. She has no rales. + Rhonchi, + decrease air exchange bilaterally   Neurological/Psychiatric:She is alert and oriented to person, place, and time. Coordination is  normal.  Her mood isAppropriate and affect is Neutral/Euthymic(normal) . IMPRESSION:   1. Osteoarthritis of spine with radiculopathy, lumbar region    2. Chronic pain syndrome    3. Lumbar spondylosis    4. Trochanteric bursitis of right hip    5. Fibromyalgia    6. DDD (degenerative disc disease), lumbar    7. Degeneration of lumbar or lumbosacral intervertebral disc        PLAN:  Informed verbal consent was obtained  -Advised patient to quit smoking for  health related concerns and to improve the treatment outcomes. Education was given on quitting smoking and the use of different modalities including medications, hypnotherapy, counselling  and biofeedback. These were discussed with patient.    -ROM/Stretching exercises as advised   -She was advised to increase fluids ( 5-7  glasses of fluid daily), limit caffeine, avoid cheese products, increase dietary fiber, increase activity and exercise as tolerated and relax regularly and enjoy meals   -Walking as tolerated 20 minutes daily   -Continue with Percocet 4 per day -Continue with all other adjuvants    Current Outpatient Medications   Medication Sig Dispense Refill    DULoxetine (CYMBALTA) 60 MG extended release capsule Take 1 capsule by mouth nightly 30 capsule 1    oxyCODONE-acetaminophen (PERCOCET) 7.5-325 MG per tablet Take 1 tablet by mouth every 6 hours as needed for Pain for up to 28 days. Max 4 a day 112 tablet 0    pantoprazole (PROTONIX) 40 MG tablet Take 1 tablet by mouth daily 30 tablet 1    tiZANidine (ZANAFLEX) 4 MG tablet Take 1 tablet by mouth nightly 30 tablet 1    gabapentin (NEURONTIN) 600 MG tablet Take 1 tablet by mouth 2 times daily for 30 days.  60 tablet 1    traZODone (DESYREL) 50 MG tablet Take 1-2 tablets by mouth nightly 60 tablet 1    meloxicam (MOBIC) 15 MG tablet TAKE 1 TABLET BY MOUTH DAILY 30 tablet 1    alendronate (FOSAMAX) 70 MG tablet Take 1 tablet by mouth every 7 days 12 tablet 1    Calcium Citrate-Vitamin D 500-500 MG-UNIT CHEW Take 1 tablet by mouth 2 times daily      atorvastatin (LIPITOR) 80 MG tablet TAKE 1 TABLET BY MOUTH EVERY DAY FOR CHOLESTEROL 90 tablet 1    Fluticasone furoate-vilanterol (BREO ELLIPTA) 200-25 MCG/INH AEPB inhaler Inhale 1 puff into the lungs daily 1 each 11    albuterol sulfate  (90 Base) MCG/ACT inhaler Inhale 2 puffs into the lungs every 6 hours as needed for Shortness of Breath 1 Inhaler 11    Blood Glucose Monitoring Suppl (ONE TOUCH ULTRA MINI) w/Device KIT 1 kit by Does not apply route daily 1 kit 0    ONE TOUCH LANCETS MISC 1 each by Does not apply route daily 100 each 3    blood glucose test strips (ASCENSIA AUTODISC VI;ONE TOUCH ULTRA TEST VI) strip 1 each by In Vitro route daily 100 each 3    ipratropium-albuterol (DUONEB) 0.5-2.5 (3) MG/3ML SOLN nebulizer solution Take 1 aerosol every 4 hours for 2 days and then as needed for shortness of breath coughing and wheezing 360 mL 3    Cholecalciferol (VITAMIN D) 2000 units TABS tablet Take 1 tablet by mouth daily 30 tablet     cetirizine (ZYRTEC) 10 MG tablet Take 10 mg by mouth daily as needed for Allergies      Nebulizers (COMPRESSOR/NEBULIZER) MISC 1 Units by Does not apply route 4 times daily 1 each 3     No current facility-administered medications for this visit. I will continue her current medication regimen  which is part of the above treatment schedule. It has been helping with Ms. Martinez Few chronic  medical problems which for this visit include:   Diagnoses of Osteoarthritis of spine with radiculopathy, lumbar region, Chronic pain syndrome, Lumbar spondylosis, Trochanteric bursitis of right hip, Fibromyalgia, DDD (degenerative disc disease), lumbar, Degeneration of lumbar or lumbosacral intervertebral disc, Gastroesophageal reflux disease without esophagitis, and Muscle cramping were pertinent to this visit. Risks and benefits of the medications and other alternative treatments  including no treatment were discussed with the patient. The common side effects of these medications were also explained to the patient. Informed verbal consent was obtained. Goals of current treatment regimen include improvement in pain, restoration of functioning- with focus on improvement in physical performance, general activity, work or disability,emotional distress, health care utilization and  decreased medication consumption. Will continue to monitor progress towards achieving/maintaining therapeutic goals with special emphasis on  1. Improvement in perceived interfernce  of pain with ADL's. Ability to do home exercises independently. Ability to do household chores indoor and/or outdoor work and social and leisure activities. Improve psychosocial and physical functioning. - she is showing progression towards this treatment goal with the current regimen.        She was advised against drinking alcohol with the narcotic pain medicines, advised against driving or handling machinery while adjusting the dose of medicines or if having cognitive

## 2021-12-21 ENCOUNTER — OFFICE VISIT (OUTPATIENT)
Dept: PAIN MANAGEMENT | Age: 76
End: 2021-12-21
Payer: MEDICARE

## 2021-12-21 VITALS
HEIGHT: 64 IN | SYSTOLIC BLOOD PRESSURE: 134 MMHG | HEART RATE: 66 BPM | WEIGHT: 133 LBS | RESPIRATION RATE: 16 BRPM | BODY MASS INDEX: 22.71 KG/M2 | OXYGEN SATURATION: 91 % | DIASTOLIC BLOOD PRESSURE: 73 MMHG

## 2021-12-21 DIAGNOSIS — M47.26 OSTEOARTHRITIS OF SPINE WITH RADICULOPATHY, LUMBAR REGION: ICD-10-CM

## 2021-12-21 DIAGNOSIS — M51.37 DEGENERATION OF LUMBAR OR LUMBOSACRAL INTERVERTEBRAL DISC: ICD-10-CM

## 2021-12-21 DIAGNOSIS — M79.7 FIBROMYALGIA: ICD-10-CM

## 2021-12-21 DIAGNOSIS — M70.61 TROCHANTERIC BURSITIS OF RIGHT HIP: ICD-10-CM

## 2021-12-21 DIAGNOSIS — M51.36 DDD (DEGENERATIVE DISC DISEASE), LUMBAR: ICD-10-CM

## 2021-12-21 DIAGNOSIS — M47.816 LUMBAR SPONDYLOSIS: ICD-10-CM

## 2021-12-21 DIAGNOSIS — G89.4 CHRONIC PAIN SYNDROME: ICD-10-CM

## 2021-12-21 PROCEDURE — G8484 FLU IMMUNIZE NO ADMIN: HCPCS | Performed by: INTERNAL MEDICINE

## 2021-12-21 PROCEDURE — 1123F ACP DISCUSS/DSCN MKR DOCD: CPT | Performed by: INTERNAL MEDICINE

## 2021-12-21 PROCEDURE — 3017F COLORECTAL CA SCREEN DOC REV: CPT | Performed by: INTERNAL MEDICINE

## 2021-12-21 PROCEDURE — 4004F PT TOBACCO SCREEN RCVD TLK: CPT | Performed by: INTERNAL MEDICINE

## 2021-12-21 PROCEDURE — 1090F PRES/ABSN URINE INCON ASSESS: CPT | Performed by: INTERNAL MEDICINE

## 2021-12-21 PROCEDURE — G8427 DOCREV CUR MEDS BY ELIG CLIN: HCPCS | Performed by: INTERNAL MEDICINE

## 2021-12-21 PROCEDURE — G8420 CALC BMI NORM PARAMETERS: HCPCS | Performed by: INTERNAL MEDICINE

## 2021-12-21 PROCEDURE — G8399 PT W/DXA RESULTS DOCUMENT: HCPCS | Performed by: INTERNAL MEDICINE

## 2021-12-21 PROCEDURE — 99213 OFFICE O/P EST LOW 20 MIN: CPT | Performed by: INTERNAL MEDICINE

## 2021-12-21 PROCEDURE — 4040F PNEUMOC VAC/ADMIN/RCVD: CPT | Performed by: INTERNAL MEDICINE

## 2021-12-21 RX ORDER — OXYCODONE AND ACETAMINOPHEN 7.5; 325 MG/1; MG/1
1 TABLET ORAL EVERY 6 HOURS PRN
Qty: 112 TABLET | Refills: 0 | Status: SHIPPED | OUTPATIENT
Start: 2021-12-21 | End: 2022-01-18 | Stop reason: SDUPTHER

## 2021-12-21 RX ORDER — TIZANIDINE 4 MG/1
4 TABLET ORAL NIGHTLY
Qty: 30 TABLET | Refills: 1 | Status: SHIPPED | OUTPATIENT
Start: 2021-12-21 | End: 2022-01-18 | Stop reason: SDUPTHER

## 2021-12-21 RX ORDER — PANTOPRAZOLE SODIUM 40 MG/1
40 TABLET, DELAYED RELEASE ORAL DAILY
Qty: 30 TABLET | Refills: 1 | Status: SHIPPED | OUTPATIENT
Start: 2021-12-21 | End: 2022-01-18 | Stop reason: SDUPTHER

## 2021-12-21 NOTE — PROGRESS NOTES
Cierra Leal  1945  [id number]    HISTORY OF PRESENT ILLNESS:  Ms. Marshal Mujica is a 78 y.o. female returns for a follow up visit for multiple medical problems. Her current presenting problems are   1. Osteoarthritis of spine with radiculopathy, lumbar region    2. Lumbar spondylosis    3. Fibromyalgia    4. Degeneration of lumbar or lumbosacral intervertebral disc    5. Moderate episode of recurrent major depressive disorder ([address]    6. Primary insomnia    7. Chronic pain syndrome    8. Trochanteric bursitis of right hip    9. DDD (degenerative disc disease), lumbar    10. Gastroesophageal reflux disease without esophagitis    11. Muscle cramping    12. Medication side effects present, subsequent encounter    . As per information/history obtained from the PADT(patient assessment and documentation tool) - She complains of pain in the lower back with radiation to the lower back She rates the pain 7/10 and describes it as aching. Pain is made worse by: standing. Current treatment regimen has helped relieve about 50% of the pain. She denies side effects from the current pain regimen. Patient reports that since the last follow up visit the physical functioning is unchanged, family/social relationships are unchanged, mood is unchanged and sleep patterns are unchanged, and that the overall functioning is unchanged. Patient denies neurological bowel or bladder. Patient denies misusing/abusing her narcotic pain medications or using any illegal drugs. There are No indicators for possible drug abuse, addiction or diversion problems. Upon obtaining the medical history from Ms. Marshal Mujica regarding today's office visit for her presenting problems, patient states she has been doing fair, pian has been manageable somewhat. She says she is using Percocet 4 per day mostly which is helping with the pain. She reports she has been using all the adjuvants. She denies any constipation symptoms.  She reports she is managing her ADLS.      ALLERGIES: Patients list of allergies were reviewed     MEDICATIONS: Ms. Laura Cantrell list of medications were reviewed. Her current medications are   Outpatient Medications Prior to Visit   Medication Sig Dispense Refill    DULoxetine (CYMBALTA) 60 MG extended release capsule Take 1 capsule by mouth nightly 30 capsule 1    oxyCODONE-acetaminophen (PERCOCET) 7.5-325 MG per tablet Take 1 tablet by mouth every 6 hours as needed for Pain for up to 28 days. Max 4 a day 112 tablet 0    pantoprazole (PROTONIX) 40 MG tablet Take 1 tablet by mouth daily 30 tablet 1    tiZANidine (ZANAFLEX) 4 MG tablet Take 1 tablet by mouth nightly 30 tablet 1    gabapentin (NEURONTIN) 600 MG tablet Take 1 tablet by mouth 2 times daily for 30 days.  60 tablet 1    traZODone (DESYREL) 50 MG tablet Take 1-2 tablets by mouth nightly 60 tablet 1    meloxicam (MOBIC) 15 MG tablet TAKE 1 TABLET BY MOUTH DAILY 30 tablet 1    alendronate (FOSAMAX) 70 MG tablet Take 1 tablet by mouth every 7 days 12 tablet 1    Calcium Citrate-Vitamin D 500-500 MG-UNIT CHEW Take 1 tablet by mouth 2 times daily      atorvastatin (LIPITOR) 80 MG tablet TAKE 1 TABLET BY MOUTH EVERY DAY FOR CHOLESTEROL 90 tablet 1    Fluticasone furoate-vilanterol (BREO ELLIPTA) 200-25 MCG/INH AEPB inhaler Inhale 1 puff into the lungs daily 1 each 11    albuterol sulfate  (90 Base) MCG/ACT inhaler Inhale 2 puffs into the lungs every 6 hours as needed for Shortness of Breath 1 Inhaler 11    Blood Glucose Monitoring Suppl (ONE TOUCH ULTRA MINI) w/Device KIT 1 kit by Does not apply route daily 1 kit 0    ONE TOUCH LANCETS MISC 1 each by Does not apply route daily 100 each 3    blood glucose test strips (ASCENSIA AUTODISC VI;ONE TOUCH ULTRA TEST VI) strip 1 each by In Vitro route daily 100 each 3    ipratropium-albuterol (DUONEB) 0.5-2.5 (3) MG/3ML SOLN nebulizer solution Take 1 aerosol every 4 hours for 2 days and then as needed for shortness of breath coughing and wheezing 360 mL 3    Cholecalciferol (VITAMIN D) 2000 units TABS tablet Take 1 tablet by mouth daily 30 tablet     cetirizine (ZYRTEC) 10 MG tablet Take 10 mg by mouth daily as needed for Allergies      Nebulizers (COMPRESSOR/NEBULIZER) MISC 1 Units by Does not apply route 4 times daily 1 each 3     No facility-administered medications prior to visit. REVIEW OF SYSTEMS:    Respiratory: Negative for apnea, chest tightness and shortness of breath or change in baseline breathing. PHYSICAL EXAM:   Nursing note and vitals reviewed. /73   Pulse 66   Resp 16   Ht 5' 4\" (1.626 m)   Wt 133 lb (60.3 kg)   SpO2 91%   Breastfeeding No   BMI 22.83 kg/m²   Constitutional: She appears well-developed and well-nourished. No acute distress. Cardiovascular: Normal rate, regular rhythm, normal heart sounds, and does not have murmur. Pulmonary/Chest: Effort normal. No respiratory distress. She does not have wheezes in the lung fields. She has no rales. Neurological/Psychiatric:She is alert and oriented to person, place, and time. Coordination is  normal.  Her mood isAppropriate and affect is Neutral/Euthymic(normal) . IMPRESSION:   1. Osteoarthritis of spine with radiculopathy, lumbar region    2. Lumbar spondylosis    3. Fibromyalgia    4. Degeneration of lumbar or lumbosacral intervertebral disc    5. Chronic pain syndrome    6. Trochanteric bursitis of right hip    7.  DDD (degenerative disc disease), lumbar        PLAN:  Informed verbal consent was obtained  -ROM/Stretching exercises as advised   -She was advised to increase fluids ( 5-7  glasses of fluid daily), limit caffeine, avoid cheese products, increase dietary fiber, increase activity and exercise as tolerated and relax regularly and enjoy meals   -Walking as tolerated   -Continue with Percocet 4 per day   -Continue with all other adjuvants   -Advised patient to quit smoking for  health related concerns and to improve the treatment outcomes. Education was given on quitting smoking and the use of different modalities including medications, hypnotherapy, counselling  and biofeedback. These were discussed with patient. Current Outpatient Medications   Medication Sig Dispense Refill    DULoxetine (CYMBALTA) 60 MG extended release capsule Take 1 capsule by mouth nightly 30 capsule 1    oxyCODONE-acetaminophen (PERCOCET) 7.5-325 MG per tablet Take 1 tablet by mouth every 6 hours as needed for Pain for up to 28 days. Max 4 a day 112 tablet 0    pantoprazole (PROTONIX) 40 MG tablet Take 1 tablet by mouth daily 30 tablet 1    tiZANidine (ZANAFLEX) 4 MG tablet Take 1 tablet by mouth nightly 30 tablet 1    gabapentin (NEURONTIN) 600 MG tablet Take 1 tablet by mouth 2 times daily for 30 days.  60 tablet 1    traZODone (DESYREL) 50 MG tablet Take 1-2 tablets by mouth nightly 60 tablet 1    meloxicam (MOBIC) 15 MG tablet TAKE 1 TABLET BY MOUTH DAILY 30 tablet 1    alendronate (FOSAMAX) 70 MG tablet Take 1 tablet by mouth every 7 days 12 tablet 1    Calcium Citrate-Vitamin D 500-500 MG-UNIT CHEW Take 1 tablet by mouth 2 times daily      atorvastatin (LIPITOR) 80 MG tablet TAKE 1 TABLET BY MOUTH EVERY DAY FOR CHOLESTEROL 90 tablet 1    Fluticasone furoate-vilanterol (BREO ELLIPTA) 200-25 MCG/INH AEPB inhaler Inhale 1 puff into the lungs daily 1 each 11    albuterol sulfate  (90 Base) MCG/ACT inhaler Inhale 2 puffs into the lungs every 6 hours as needed for Shortness of Breath 1 Inhaler 11    Blood Glucose Monitoring Suppl (ONE TOUCH ULTRA MINI) w/Device KIT 1 kit by Does not apply route daily 1 kit 0    ONE TOUCH LANCETS MISC 1 each by Does not apply route daily 100 each 3    blood glucose test strips (ASCENSIA AUTODISC VI;ONE TOUCH ULTRA TEST VI) strip 1 each by In Vitro route daily 100 each 3    ipratropium-albuterol (DUONEB) 0.5-2.5 (3) MG/3ML SOLN nebulizer solution Take 1 aerosol every 4 hours for 2 days and then as needed for shortness of breath coughing and wheezing 360 mL 3    Cholecalciferol (VITAMIN D) 2000 units TABS tablet Take 1 tablet by mouth daily 30 tablet     cetirizine (ZYRTEC) 10 MG tablet Take 10 mg by mouth daily as needed for Allergies      Nebulizers (COMPRESSOR/NEBULIZER) MISC 1 Units by Does not apply route 4 times daily 1 each 3     No current facility-administered medications for this visit. I will continue her current medication regimen  which is part of the above treatment schedule. It has been helping with Ms. Daren Minaya chronic  medical problems which for this visit include:   Diagnoses of Osteoarthritis of spine with radiculopathy, lumbar region, Lumbar spondylosis, Fibromyalgia, Degeneration of lumbar or lumbosacral intervertebral disc, Moderate episode of recurrent major depressive disorder (HCC), Chronic pain syndrome, Trochanteric bursitis of right hip, and DDD (degenerative disc disease), lumbar were pertinent to this visit. Risks and benefits of the medications and other alternative treatments  including no treatment were discussed with the patient. The common side effects of these medications were also explained to the patient. Informed verbal consent was obtained. Goals of current treatment regimen include improvement in pain, restoration of functioning- with focus on improvement in physical performance, general activity, work or disability,emotional distress, health care utilization and  decreased medication consumption. Will continue to monitor progress towards achieving/maintaining therapeutic goals with special emphasis on  1. Improvement in perceived interfernce  of pain with ADL's. Ability to do home exercises independently. Ability to do household chores indoor and/or outdoor work and social and leisure activities. Improve psychosocial and physical functioning. - she is showing progression towards this treatment goal with the current regimen.     She was advised against drinking alcohol with the narcotic pain medicines, advised against driving or handling machinery while adjusting the dose of medicines or if having cognitive  issues related to the current medications. Risk of overdose and death, if medicines not taken as prescribed, were also discussed. If the patient develops new symptoms or if the symptoms worsen, the patient should call the office. While transcribing every attempt was made to maintain the accuracy of the note in terms of it's contents,there may have been some errors made inadvertently. Thank you for allowing me to participate in the care of this patient.     Patti Kapoor MD.    Cc: HECTOR Rivera - CNP

## 2021-12-29 ENCOUNTER — OFFICE VISIT (OUTPATIENT)
Dept: PULMONOLOGY | Age: 76
End: 2021-12-29
Payer: MEDICARE

## 2021-12-29 VITALS
HEIGHT: 64 IN | TEMPERATURE: 97 F | RESPIRATION RATE: 16 BRPM | WEIGHT: 136.6 LBS | OXYGEN SATURATION: 93 % | DIASTOLIC BLOOD PRESSURE: 60 MMHG | SYSTOLIC BLOOD PRESSURE: 110 MMHG | HEART RATE: 76 BPM | BODY MASS INDEX: 23.32 KG/M2

## 2021-12-29 DIAGNOSIS — J44.9 COPD, SEVERE (HCC): ICD-10-CM

## 2021-12-29 DIAGNOSIS — Z72.0 TOBACCO ABUSE: ICD-10-CM

## 2021-12-29 DIAGNOSIS — J44.9 COPD, MODERATE (HCC): ICD-10-CM

## 2021-12-29 PROCEDURE — G8427 DOCREV CUR MEDS BY ELIG CLIN: HCPCS | Performed by: INTERNAL MEDICINE

## 2021-12-29 PROCEDURE — G8420 CALC BMI NORM PARAMETERS: HCPCS | Performed by: INTERNAL MEDICINE

## 2021-12-29 PROCEDURE — 4004F PT TOBACCO SCREEN RCVD TLK: CPT | Performed by: INTERNAL MEDICINE

## 2021-12-29 PROCEDURE — G8399 PT W/DXA RESULTS DOCUMENT: HCPCS | Performed by: INTERNAL MEDICINE

## 2021-12-29 PROCEDURE — 3023F SPIROM DOC REV: CPT | Performed by: INTERNAL MEDICINE

## 2021-12-29 PROCEDURE — 4040F PNEUMOC VAC/ADMIN/RCVD: CPT | Performed by: INTERNAL MEDICINE

## 2021-12-29 PROCEDURE — G8484 FLU IMMUNIZE NO ADMIN: HCPCS | Performed by: INTERNAL MEDICINE

## 2021-12-29 PROCEDURE — 1123F ACP DISCUSS/DSCN MKR DOCD: CPT | Performed by: INTERNAL MEDICINE

## 2021-12-29 PROCEDURE — G8926 SPIRO NO PERF OR DOC: HCPCS | Performed by: INTERNAL MEDICINE

## 2021-12-29 PROCEDURE — 99214 OFFICE O/P EST MOD 30 MIN: CPT | Performed by: INTERNAL MEDICINE

## 2021-12-29 PROCEDURE — 1090F PRES/ABSN URINE INCON ASSESS: CPT | Performed by: INTERNAL MEDICINE

## 2021-12-29 PROCEDURE — 3017F COLORECTAL CA SCREEN DOC REV: CPT | Performed by: INTERNAL MEDICINE

## 2021-12-29 RX ORDER — ALBUTEROL SULFATE 90 UG/1
2 AEROSOL, METERED RESPIRATORY (INHALATION) EVERY 6 HOURS PRN
Qty: 1 EACH | Refills: 11 | Status: SHIPPED | OUTPATIENT
Start: 2021-12-29

## 2021-12-29 ASSESSMENT — COPD QUESTIONNAIRES
QUESTION6_LEAVINGHOUSE: 2
QUESTION7_SLEEPQUALITY: 0
QUESTION4_WALKINCLINE: 3
QUESTION2_CHESTPHLEGM: 2
QUESTION8_ENERGYLEVEL: 3
QUESTION1_COUGHFREQUENCY: 3
QUESTION3_CHESTTIGHTNESS: 0
CAT_TOTALSCORE: 16
QUESTION5_HOMEACTIVITIES: 3

## 2021-12-29 NOTE — PROGRESS NOTES
REASON FOR CONSULTATION/CC: COPD          PCP: HECTOR Watkins CNP    HISTORY OF PRESENT ILLNESS: Janelle Hamilton is a 78y.o. year old female with a history of COPD who presents :           COPD Assessment Test (CAT)  Cough Assessment: 3  Phlegm Assessment: 2  Chest tightness: 0  Walking on an incline: 3  Home Activities: 3  Confident Leaving The Home: 2  Sleeping Soundly: 0  Have Energy: 3  Assessment Score: 16          Copd   albuterol   Breo   breathin well      Tobacco abuse  Still smoking . No really ready toquit. Considering to quit after new year. Objective:   PHYSICAL EXAM:  Blood pressure 110/60, pulse 76, temperature 97 °F (36.1 °C), temperature source Infrared, resp. rate 16, height 5' 4\" (1.626 m), weight 136 lb 9.6 oz (62 kg), SpO2 93 %, not currently breastfeeding.'    Gen: No distress. Eyes:    ENT:    Neck:    Resp: No accessory muscle use. No crackles. No wheezes. No rhonchi. CV: Regular rate. Regular rhythm. No murmur or rub. No edema. GI:    Skin:    Lymph:    M/S: No cyanosis. No clubbing. Neuro: Moves all four extremities. Psych: Oriented x 3. No anxiety. Awake. Alert. Intact judgement and insight. Data Reviewed:        Assessment:     COPD, Moderately severe FEV1 59%  Tobacco abuse. Pulmonary nodules, stable    Plan:        Problem List Items Addressed This Visit     Tobacco abuse-advised to quit--lung cancer screening neg 5/18--repeat low dose ct 1 yr     Considering trying to quit at new year. COPD, moderate ([facility])     Breo and albuterol          Relevant Medications    albuterol sulfate  (90 Base) MCG/ACT inhaler      Other Visit Diagnoses     COPD, severe ([address]        Relevant Medications    albuterol sulfate  (90 Base) MCG/ACT inhaler        This note was transcribed using [id number] [employer]. Please disregard any translational errors.

## 2022-01-06 DIAGNOSIS — G89.4 CHRONIC PAIN SYNDROME: ICD-10-CM

## 2022-01-18 ENCOUNTER — OFFICE VISIT (OUTPATIENT)
Dept: PAIN MANAGEMENT | Age: 77
End: 2022-01-18
Payer: MEDICARE

## 2022-01-18 VITALS
WEIGHT: 136 LBS | SYSTOLIC BLOOD PRESSURE: 135 MMHG | BODY MASS INDEX: 23.22 KG/M2 | HEART RATE: 83 BPM | OXYGEN SATURATION: 93 % | HEIGHT: 64 IN | DIASTOLIC BLOOD PRESSURE: 77 MMHG

## 2022-01-18 DIAGNOSIS — R25.2 MUSCLE CRAMPING: ICD-10-CM

## 2022-01-18 DIAGNOSIS — M70.61 TROCHANTERIC BURSITIS OF RIGHT HIP: ICD-10-CM

## 2022-01-18 DIAGNOSIS — M51.36 DDD (DEGENERATIVE DISC DISEASE), LUMBAR: ICD-10-CM

## 2022-01-18 DIAGNOSIS — M79.7 FIBROMYALGIA: ICD-10-CM

## 2022-01-18 DIAGNOSIS — M51.37 DEGENERATION OF LUMBAR OR LUMBOSACRAL INTERVERTEBRAL DISC: ICD-10-CM

## 2022-01-18 DIAGNOSIS — K21.9 GASTROESOPHAGEAL REFLUX DISEASE WITHOUT ESOPHAGITIS: ICD-10-CM

## 2022-01-18 DIAGNOSIS — M47.816 LUMBAR SPONDYLOSIS: ICD-10-CM

## 2022-01-18 DIAGNOSIS — F51.01 PRIMARY INSOMNIA: ICD-10-CM

## 2022-01-18 DIAGNOSIS — G89.4 CHRONIC PAIN SYNDROME: ICD-10-CM

## 2022-01-18 DIAGNOSIS — F33.1 MODERATE EPISODE OF RECURRENT MAJOR DEPRESSIVE DISORDER (HCC): ICD-10-CM

## 2022-01-18 DIAGNOSIS — M47.26 OSTEOARTHRITIS OF SPINE WITH RADICULOPATHY, LUMBAR REGION: ICD-10-CM

## 2022-01-18 PROCEDURE — 4004F PT TOBACCO SCREEN RCVD TLK: CPT | Performed by: INTERNAL MEDICINE

## 2022-01-18 PROCEDURE — G8427 DOCREV CUR MEDS BY ELIG CLIN: HCPCS | Performed by: INTERNAL MEDICINE

## 2022-01-18 PROCEDURE — 3017F COLORECTAL CA SCREEN DOC REV: CPT | Performed by: INTERNAL MEDICINE

## 2022-01-18 PROCEDURE — 1123F ACP DISCUSS/DSCN MKR DOCD: CPT | Performed by: INTERNAL MEDICINE

## 2022-01-18 PROCEDURE — G8420 CALC BMI NORM PARAMETERS: HCPCS | Performed by: INTERNAL MEDICINE

## 2022-01-18 PROCEDURE — 99214 OFFICE O/P EST MOD 30 MIN: CPT | Performed by: INTERNAL MEDICINE

## 2022-01-18 PROCEDURE — 1090F PRES/ABSN URINE INCON ASSESS: CPT | Performed by: INTERNAL MEDICINE

## 2022-01-18 PROCEDURE — G8484 FLU IMMUNIZE NO ADMIN: HCPCS | Performed by: INTERNAL MEDICINE

## 2022-01-18 PROCEDURE — 4040F PNEUMOC VAC/ADMIN/RCVD: CPT | Performed by: INTERNAL MEDICINE

## 2022-01-18 PROCEDURE — G8399 PT W/DXA RESULTS DOCUMENT: HCPCS | Performed by: INTERNAL MEDICINE

## 2022-01-18 RX ORDER — DULOXETIN HYDROCHLORIDE 60 MG/1
60 CAPSULE, DELAYED RELEASE ORAL NIGHTLY
Qty: 30 CAPSULE | Refills: 1 | Status: SHIPPED | OUTPATIENT
Start: 2022-01-18 | End: 2022-03-15 | Stop reason: SDUPTHER

## 2022-01-18 RX ORDER — PANTOPRAZOLE SODIUM 40 MG/1
40 TABLET, DELAYED RELEASE ORAL DAILY
Qty: 30 TABLET | Refills: 1 | Status: SHIPPED | OUTPATIENT
Start: 2022-01-18 | End: 2022-02-15 | Stop reason: SDUPTHER

## 2022-01-18 RX ORDER — TRAZODONE HYDROCHLORIDE 50 MG/1
50-100 TABLET ORAL NIGHTLY
Qty: 60 TABLET | Refills: 1 | Status: SHIPPED | OUTPATIENT
Start: 2022-01-18 | End: 2022-03-15 | Stop reason: SDUPTHER

## 2022-01-18 RX ORDER — DULOXETIN HYDROCHLORIDE 60 MG/1
CAPSULE, DELAYED RELEASE ORAL
Qty: 30 CAPSULE | Refills: 1 | OUTPATIENT
Start: 2022-01-18

## 2022-01-18 RX ORDER — TIZANIDINE 4 MG/1
4 TABLET ORAL NIGHTLY
Qty: 30 TABLET | Refills: 1 | Status: SHIPPED | OUTPATIENT
Start: 2022-01-18 | End: 2022-03-15 | Stop reason: SDUPTHER

## 2022-01-18 RX ORDER — TIZANIDINE 4 MG/1
TABLET ORAL
Qty: 30 TABLET | Refills: 1 | OUTPATIENT
Start: 2022-01-18

## 2022-01-18 RX ORDER — MELOXICAM 15 MG/1
15 TABLET ORAL DAILY
Qty: 30 TABLET | Refills: 1 | Status: SHIPPED | OUTPATIENT
Start: 2022-01-18 | End: 2022-03-15 | Stop reason: SDUPTHER

## 2022-01-18 RX ORDER — GABAPENTIN 600 MG/1
600 TABLET ORAL 2 TIMES DAILY
Qty: 60 TABLET | Refills: 1 | Status: SHIPPED | OUTPATIENT
Start: 2022-01-18 | End: 2022-03-15 | Stop reason: SDUPTHER

## 2022-01-18 RX ORDER — OXYCODONE AND ACETAMINOPHEN 7.5; 325 MG/1; MG/1
1 TABLET ORAL EVERY 6 HOURS PRN
Qty: 112 TABLET | Refills: 0 | Status: SHIPPED | OUTPATIENT
Start: 2022-01-18 | End: 2022-02-15 | Stop reason: SDUPTHER

## 2022-01-18 NOTE — PROGRESS NOTES
[facility]  1945  [id number]    HISTORY OF PRESENT ILLNESS:  Ms. Bronson Reddy is a 78 y.o. female returns for a follow up visit for multiple medical problems. Her current presenting problems are   1. Chronic pain syndrome    2. Lumbar spondylosis    3. Degeneration of lumbar or lumbosacral intervertebral disc    4. DDD (degenerative disc disease), lumbar    5. Moderate episode of recurrent major depressive disorder ([address]    6. Primary insomnia    7. Osteoarthritis of spine with radiculopathy, lumbar region    8. Trochanteric bursitis of right hip    9. Fibromyalgia    10. Gastroesophageal reflux disease without esophagitis    11. Muscle cramping    12. Medication side effects present, subsequent encounter    . As per information/history obtained from the PADT(patient assessment and documentation tool) - She complains of pain in the lower back with radiation to the lower back She rates the pain 6/10 and describes it as aching. Pain is made worse by: standing. Current treatment regimen has helped relieve about 40% of the pain. She denies side effects from the current pain regimen. Patient reports that since the last follow up visit the physical functioning is unchanged, family/social relationships are unchanged, mood is unchanged and sleep patterns are unchanged, and that the overall functioning is unchanged. Patient denies neurological bowel or bladder. Patient denies misusing/abusing her narcotic pain medications or using any illegal drugs. There are No indicators for possible drug abuse, addiction or diversion problems. Upon obtaining the medical history from Ms. Bronson Reddy regarding today's office visit for her presenting problems, patient states she has been doing fair, her pain has been manageable. Ms. Bronson Reddy mentions she is using Percocet 4 per day. She reports her breathing has been baseline. Patient is complaining of her back hurting worse than her legs. Patient denies any constipation symptoms. Patient is complaining of cramping in the legs still, her legs and feet have been sore for days. She mentions she is on all the other adjuvants including Neurontin 1200 mg. Patient states she sleeps well. Has normal sleep latency. Averages about 5-7 hours of sleep a night. Denies any signs of sleep apnea. Feels rested in the AM. Denies any sleep attacks during the day. Medication regimen helps with maintaining/regulating sleep. Patient's  subjective report of her mood is fair. she describes occasional symptoms of depression, occasional  irritability and some mood swings. Describes her mood as being neutral and reports some pleasure in her daily activities. Reports  fair  appetite, energy and concentration. Able to function well in different aspects of her daily activities. Denies suicidal or homicidal ideation. Denies any complaints of increased tension, does   Worry sometimes and occasional  irritability  she denies any c/o increased anxiety, No c/o panic attacks or symptoms of PTSD. Denies abdominal pain, dysphagia, gas bloat, heartburn, nausea, odynophagia, regurgitation, vomiting and water brash-GERD Sxs are controlled  with the medications, she is on Protonix. ALLERGIES/PAST MED/FAM/SOC HISTORY: Ms. Raman Adames allergies, past medical, family and social history were reviewed in the chart.       Ms. Raman Adames current medications are   Outpatient Medications Prior to Visit   Medication Sig Dispense Refill    albuterol sulfate  (90 Base) MCG/ACT inhaler Inhale 2 puffs into the lungs every 6 hours as needed for Shortness of Breath 1 each 11    alendronate (FOSAMAX) 70 MG tablet Take 1 tablet by mouth every 7 days 12 tablet 1    Calcium Citrate-Vitamin D 500-500 MG-UNIT CHEW Take 1 tablet by mouth 2 times daily      atorvastatin (LIPITOR) 80 MG tablet TAKE 1 TABLET BY MOUTH EVERY DAY FOR CHOLESTEROL 90 tablet 1    Fluticasone furoate-vilanterol (BREO ELLIPTA) 200-25 MCG/INH AEPB inhaler Inhale 1 puff into the lungs daily 1 each 11    Blood Glucose Monitoring Suppl (ONE TOUCH ULTRA MINI) w/Device KIT 1 kit by Does not apply route daily 1 kit 0    ONE TOUCH LANCETS MISC 1 each by Does not apply route daily 100 each 3    blood glucose test strips (ASCENSIA AUTODISC VI;ONE TOUCH ULTRA TEST VI) strip 1 each by In Vitro route daily 100 each 3    ipratropium-albuterol (DUONEB) 0.5-2.5 (3) MG/3ML SOLN nebulizer solution Take 1 aerosol every 4 hours for 2 days and then as needed for shortness of breath coughing and wheezing 360 mL 3    Cholecalciferol (VITAMIN D) 2000 units TABS tablet Take 1 tablet by mouth daily 30 tablet     cetirizine (ZYRTEC) 10 MG tablet Take 10 mg by mouth daily as needed for Allergies      Nebulizers (COMPRESSOR/NEBULIZER) MISC 1 Units by Does not apply route 4 times daily 1 each 3    oxyCODONE-acetaminophen (PERCOCET) 7.5-325 MG per tablet Take 1 tablet by mouth every 6 hours as needed for Pain for up to 28 days. Max 4 a day 112 tablet 0    pantoprazole (PROTONIX) 40 MG tablet Take 1 tablet by mouth daily 30 tablet 1    tiZANidine (ZANAFLEX) 4 MG tablet Take 1 tablet by mouth nightly 30 tablet 1    DULoxetine (CYMBALTA) 60 MG extended release capsule Take 1 capsule by mouth nightly 30 capsule 1    gabapentin (NEURONTIN) 600 MG tablet Take 1 tablet by mouth 2 times daily for 30 days. 60 tablet 1    traZODone (DESYREL) 50 MG tablet Take 1-2 tablets by mouth nightly 60 tablet 1    meloxicam (MOBIC) 15 MG tablet TAKE 1 TABLET BY MOUTH DAILY 30 tablet 1     No facility-administered medications prior to visit. REVIEW OF SYSTEMS:     Respiratory: Negative for shortness of breath. Cardiovascular: Negative for chest pain, palpitations  Gastrointestinal: Negative for blood in stool, abdominal distention, nausea, vomiting, abdominal pain, diarrhea,constipation.   Neurological: Negative for speech difficulty, weakness and light-headedness, dizziness, tremors, sleepiness  Psychiatric/Behavioral: Negative for suicidal ideas, hallucinations, behavioral problems, self-injury, decreased concentration/cognition, agitation, confusion. PHYSICAL EXAM:   Nursing note and vitals reviewed. /77   Pulse 83   Ht 5' 4\" (1.626 m)   Wt 136 lb (61.7 kg)   SpO2 93%   BMI 23.34 kg/m²   General Appearance: Patient is well nourished, well developed, well groomed and in no acute distress. Skin: Skin is warm and dry, good turgor . No rash or lesions noted. She is not diaphoretic. Pulmonary/Chest: Effort normal. No respiratory distress or use of accessory muscles. Auscultation revealing decreased air exchange bilaterally. She does not have wheezes in the lung fields. She has no rales. Cardiovascular: Normal rate, regular rhythm, normal heart sounds, and does not have murmur. Exam reveals no gallop and no friction rub. Musculoskeletal / Extremities: Range of motion is normal. Gait is normal, assistive devices use: none. She exhibits edema: none, and no tenderness. Neurological/Psychiatric:She is alert and oriented to person, place, and time. Coordination is  normal.   Judgement and Insight is normal  Her mood is Appropriate and affect is Neutral/Euthymic(normal) . Her behavior is normal.   thought content normal.        IMPRESSION:     1. Chronic pain syndrome  - STABLE: Continue current treatment plan    2. Lumbar spondylosis  - STABLE: Continue current treatment plan    3. Degeneration of lumbar or lumbosacral intervertebral disc  - STABLE: Continue current treatment plan    4. DDD (degenerative disc disease), lumbar  - STABLE: Continue current treatment plan    5. Moderate episode of recurrent major depressive disorder ([facility] [address]  - STABLE: Continue current treatment plan    6. Primary insomnia  - STABLE: Continue current treatment plan    7. Osteoarthritis of spine with radiculopathy, lumbar region  - STABLE: Continue current treatment plan    8.  Fibromyalgia  - STABLE: Continue current treatment plan    9. Gastroesophageal reflux disease without esophagitis  - STABLE: Continue current treatment plan    10. Muscle cramping  - STABLE: Continue current treatment plan    11. Trochanteric bursitis of right hip  - STABLE: Continue current treatment plan        PLAN:  Informed verbal consent was obtained. -[place] record was obtained and reviewed  for the last one year and no indicators of drug misuse  were found. Any other controlled substance prescriptions  seen on the record have been accounted for, I am aware of the patient receiving these medications. [place] record will be rechecked as part of office protocol.    -Continue with Percocet 4 per day   -Start  mg BID PRN   -Advised patient to quit smoking for  health related concerns and to improve the treatment outcomes. Education was given on quitting smoking and the use of different modalities including medications, hypnotherapy, counselling  and biofeedback.  These were discussed with patient.   -Continue with Neurontin 1200 mg   -She was advised to increase fluids ( 5-7  glasses of fluid daily), limit caffeine, avoid cheese products, increase dietary fiber, increase activity and exercise as tolerated and relax regularly and enjoy meals   -she was advised proper sleep hygiene-told to avoid:use of caffeine or other stimulants after noon, alcohol use near bedtime, long or frequent naps during the day, erratic sleep schedule, heavy meals near bedtime, vigorous exercise near bedtime and use of electronic devices near bedtime   -Continue with Trazodone 50 mg 1-2 nightly   -Advised caffeine reduction, dietary changes, elevate head end of bed, NPO after supper, if using alcohol advised reduction of alcohol intake, tobacco cessation if smoking, weight loss   -Continue with Protonix   -CBT techniques- relaxation therapies such as biofeedback, mindfulness based stress reduction, imagery, cognitive restructuring, problem solving discussed with patient   -Continue with Cymbalta 60 mg   -Interm history reviewed    -Labs reviewed    Ms. Lyndell Schirmer will be prescribed  the medications  listed below which are for treatment of her presenting  medical problems which for this visit include:   Diagnoses of Chronic pain syndrome, Lumbar spondylosis, Degeneration of lumbar or lumbosacral intervertebral disc, DDD (degenerative disc disease), lumbar, Moderate episode of recurrent major depressive disorder ([address] Primary insomnia, Osteoarthritis of spine with radiculopathy, lumbar region, Fibromyalgia, Gastroesophageal reflux disease without esophagitis, Muscle cramping, and Trochanteric bursitis of right hip were pertinent to this visit. Medications/orders associated with this visit:    Current Outpatient Medications   Medication Sig Dispense Refill    oxyCODONE-acetaminophen (PERCOCET) 7.5-325 MG per tablet Take 1 tablet by mouth every 6 hours as needed for Pain for up to 28 days. Max 4 a day 112 tablet 0    pantoprazole (PROTONIX) 40 MG tablet Take 1 tablet by mouth daily 30 tablet 1    tiZANidine (ZANAFLEX) 4 MG tablet Take 1 tablet by mouth nightly 30 tablet 1    DULoxetine (CYMBALTA) 60 MG extended release capsule Take 1 capsule by mouth nightly 30 capsule 1    traZODone (DESYREL) 50 MG tablet Take 1-2 tablets by mouth nightly 60 tablet 1    gabapentin (NEURONTIN) 600 MG tablet Take 1 tablet by mouth 2 times daily for 30 days.  60 tablet 1    meloxicam (MOBIC) 15 MG tablet Take 1 tablet by mouth daily 30 tablet 1    albuterol sulfate  (90 Base) MCG/ACT inhaler Inhale 2 puffs into the lungs every 6 hours as needed for Shortness of Breath 1 each 11    alendronate (FOSAMAX) 70 MG tablet Take 1 tablet by mouth every 7 days 12 tablet 1    Calcium Citrate-Vitamin D 500-500 MG-UNIT CHEW Take 1 tablet by mouth 2 times daily      atorvastatin (LIPITOR) 80 MG tablet TAKE 1 TABLET BY MOUTH EVERY DAY FOR CHOLESTEROL 90 tablet 1    Fluticasone furoate-vilanterol (BREO ELLIPTA) 200-25 MCG/INH AEPB inhaler Inhale 1 puff into the lungs daily 1 each 11    Blood Glucose Monitoring Suppl (ONE TOUCH ULTRA MINI) w/Device KIT 1 kit by Does not apply route daily 1 kit 0    ONE TOUCH LANCETS MISC 1 each by Does not apply route daily 100 each 3    blood glucose test strips (ASCENSIA AUTODISC VI;ONE TOUCH ULTRA TEST VI) strip 1 each by In Vitro route daily 100 each 3    ipratropium-albuterol (DUONEB) 0.5-2.5 (3) MG/3ML SOLN nebulizer solution Take 1 aerosol every 4 hours for 2 days and then as needed for shortness of breath coughing and wheezing 360 mL 3    Cholecalciferol (VITAMIN D) 2000 units TABS tablet Take 1 tablet by mouth daily 30 tablet     cetirizine (ZYRTEC) 10 MG tablet Take 10 mg by mouth daily as needed for Allergies      Nebulizers (COMPRESSOR/NEBULIZER) MISC 1 Units by Does not apply route 4 times daily 1 each 3     No current facility-administered medications for this visit. Goals of current treatment regimen include improvement in pain, restoration of functioning- with focus on improvement in physical performance, general activity, work or disability,emotional distress, health care utilization and  decreased medication consumption. Will continue to monitor progress towards achieving/maintaining therapeutic goals with special emphasis on  1. Improvement in perceived interfernce  of pain with ADL's. Ability to do home exercises independently. Ability to do household chores indoor and/or outdoor work and social and leisure activities. To increase flexibility/ROM, strength and endurance. Improve psychosocial and physical functioning.- she is showing progression towards this treatment goal with the current regimen. 2. Improving sleep to 6-7 hours a night. Improve mood/ anxiety and depression symptoms such as crying spells, low energy, problems with concentration, motivation. - she is showing progression towards this treatment goal with the current regimen. 3. Reduction of reliance on opioid analgesia/more appropriate opioid use. - she is showing progression towards this treatment goal with the current regimen. Risks and benefits of the medications and other alternative treatments have been/were  discussed with the patient. Any questions on the  common side effects of these medications were also answered. She was advised against drinking alcohol with the narcotic pain medicines, advised against driving or handling machinery when  starting or adjusting the dose of medicines, feeling groggy or drowsy, or if having any cognitive issues related to the current medications. Sheis fully aware of the risk of overdose and death, if medicines are misused and not taken as prescribed. If she develops new symptoms or if the symptoms worsen, she was told to call the office. .  Thank you for allowing me to participate in the care of this patient.     Tai Hermosillo MD.    Cc: Lyndsay Martin, HECTOR - CNP

## 2022-01-31 ENCOUNTER — TELEPHONE (OUTPATIENT)
Dept: FAMILY MEDICINE CLINIC | Age: 77
End: 2022-01-31

## 2022-01-31 DIAGNOSIS — J44.9 COPD, SEVERE (HCC): ICD-10-CM

## 2022-01-31 RX ORDER — PREDNISONE 10 MG/1
TABLET ORAL
Qty: 30 TABLET | Refills: 0 | OUTPATIENT
Start: 2022-01-31

## 2022-01-31 NOTE — TELEPHONE ENCOUNTER
PT CALLED IN EVA ERICKSON CALLED IN SHE STATED SHE HAS USED HER INHALER HELPS A LITTLE PT SOMETIMES HAS A HARD TIME CATCHING HER BREATH BUT NOTHING SERIOUS BEEN GOING ON FOR AT LEAST A WEEK EVERY ONCE IN A WHILE THE PT STATED SHE HAS A PAIN IN HER LUNG   NO OTHER SYMPTOMS   PT HAS BEEN ON PREDNISONE BEFORE IT HAS HELPED SOMETIMES THE PULMONOLOGIST CALLED IN IT AS WELL   VERIFIED PHARMACY

## 2022-02-01 ENCOUNTER — OFFICE VISIT (OUTPATIENT)
Dept: FAMILY MEDICINE CLINIC | Age: 77
End: 2022-02-01
Payer: MEDICARE

## 2022-02-01 VITALS
DIASTOLIC BLOOD PRESSURE: 70 MMHG | OXYGEN SATURATION: 93 % | WEIGHT: 135.6 LBS | HEART RATE: 70 BPM | RESPIRATION RATE: 16 BRPM | TEMPERATURE: 98.4 F | HEIGHT: 64 IN | BODY MASS INDEX: 23.15 KG/M2 | SYSTOLIC BLOOD PRESSURE: 112 MMHG

## 2022-02-01 DIAGNOSIS — J44.9 COPD, SEVERE (HCC): ICD-10-CM

## 2022-02-01 DIAGNOSIS — J20.9 COPD WITH ACUTE BRONCHITIS (HCC): ICD-10-CM

## 2022-02-01 DIAGNOSIS — J44.0 COPD WITH ACUTE BRONCHITIS (HCC): ICD-10-CM

## 2022-02-01 DIAGNOSIS — E78.00 HYPERCHOLESTEREMIA: ICD-10-CM

## 2022-02-01 DIAGNOSIS — E11.9 CONTROLLED TYPE 2 DIABETES MELLITUS WITHOUT COMPLICATION, WITHOUT LONG-TERM CURRENT USE OF INSULIN (HCC): ICD-10-CM

## 2022-02-01 LAB — HBA1C MFR BLD: 5.8 %

## 2022-02-01 PROCEDURE — 4004F PT TOBACCO SCREEN RCVD TLK: CPT | Performed by: NURSE PRACTITIONER

## 2022-02-01 PROCEDURE — 3044F HG A1C LEVEL LT 7.0%: CPT | Performed by: NURSE PRACTITIONER

## 2022-02-01 PROCEDURE — 1123F ACP DISCUSS/DSCN MKR DOCD: CPT | Performed by: NURSE PRACTITIONER

## 2022-02-01 PROCEDURE — G8427 DOCREV CUR MEDS BY ELIG CLIN: HCPCS | Performed by: NURSE PRACTITIONER

## 2022-02-01 PROCEDURE — 4040F PNEUMOC VAC/ADMIN/RCVD: CPT | Performed by: NURSE PRACTITIONER

## 2022-02-01 PROCEDURE — G8420 CALC BMI NORM PARAMETERS: HCPCS | Performed by: NURSE PRACTITIONER

## 2022-02-01 PROCEDURE — 2022F DILAT RTA XM EVC RTNOPTHY: CPT | Performed by: NURSE PRACTITIONER

## 2022-02-01 PROCEDURE — 83036 HEMOGLOBIN GLYCOSYLATED A1C: CPT | Performed by: NURSE PRACTITIONER

## 2022-02-01 PROCEDURE — 1090F PRES/ABSN URINE INCON ASSESS: CPT | Performed by: NURSE PRACTITIONER

## 2022-02-01 PROCEDURE — G8399 PT W/DXA RESULTS DOCUMENT: HCPCS | Performed by: NURSE PRACTITIONER

## 2022-02-01 PROCEDURE — G8484 FLU IMMUNIZE NO ADMIN: HCPCS | Performed by: NURSE PRACTITIONER

## 2022-02-01 PROCEDURE — 3017F COLORECTAL CA SCREEN DOC REV: CPT | Performed by: NURSE PRACTITIONER

## 2022-02-01 PROCEDURE — 3023F SPIROM DOC REV: CPT | Performed by: NURSE PRACTITIONER

## 2022-02-01 PROCEDURE — 99214 OFFICE O/P EST MOD 30 MIN: CPT | Performed by: NURSE PRACTITIONER

## 2022-02-01 RX ORDER — PREDNISONE 10 MG/1
TABLET ORAL
Qty: 30 TABLET | Refills: 0 | Status: SHIPPED | OUTPATIENT
Start: 2022-02-01 | End: 2022-02-28 | Stop reason: SDUPTHER

## 2022-02-01 RX ORDER — AZITHROMYCIN 250 MG/1
TABLET, FILM COATED ORAL
Qty: 1 PACKET | Refills: 0 | Status: SHIPPED | OUTPATIENT
Start: 2022-02-01 | End: 2022-02-11

## 2022-02-01 ASSESSMENT — ENCOUNTER SYMPTOMS
COUGH: 1
DIARRHEA: 0
NAUSEA: 0
CONSTIPATION: 0
VOMITING: 0
CHEST TIGHTNESS: 0
SHORTNESS OF BREATH: 1

## 2022-02-01 NOTE — PROGRESS NOTES
3/11-repeat 3-5 yrs--dr Kaila Esquivel)    Family history of colon cancer    CTS (carpal tunnel syndrome)BILATERAL-s/p surgical repair--resolved     Postmenopausal status-last mammogram 2/15 wnl last pap 12/13--wnl    Nondependent alcohol abuse, in remission    Urticaria, chronic    Tobacco abuse-advised to quit--lung cancer screening neg 5/18--repeat low dose ct 1 yr    Chronic back pain-(djd) saw dr Belinda Lee afford surgery--seeing dr Natalie Mullen for pain meds    Fibromyalgia    Hypercholesteremia    Lumbar spondylosis    Vitamin D deficiency--severe--started 50,000 iu 2x/ wk 11/13    Insomnia--on elevil w help    Constipation--on daily miralax    Depression    Chronic pain syndrome    Muscle cramping    Gastroesophageal reflux disease    COPD, moderate (HCC)    Chronic hypoxemic respiratory failure (HCC)    Degeneration of lumbar or lumbosacral intervertebral disc    Trochanteric bursitis of right hip    New onset type 2 diabetes mellitus ([address]    Controlled type 2 diabetes mellitus without complication, without long-term current use of insulin ([address]    Age-related osteoporosis without current pathological fracture- started alendronate 9/2021          Current Outpatient Medications   Medication Sig Dispense Refill    oxyCODONE-acetaminophen (PERCOCET) 7.5-325 MG per tablet Take 1 tablet by mouth every 6 hours as needed for Pain for up to 28 days. Max 4 a day 112 tablet 0    pantoprazole (PROTONIX) 40 MG tablet Take 1 tablet by mouth daily 30 tablet 1    tiZANidine (ZANAFLEX) 4 MG tablet Take 1 tablet by mouth nightly 30 tablet 1    DULoxetine (CYMBALTA) 60 MG extended release capsule Take 1 capsule by mouth nightly 30 capsule 1    traZODone (DESYREL) 50 MG tablet Take 1-2 tablets by mouth nightly 60 tablet 1    gabapentin (NEURONTIN) 600 MG tablet Take 1 tablet by mouth 2 times daily for 30 days.  60 tablet 1    meloxicam (MOBIC) 15 MG tablet Take 1 tablet by mouth daily 30 tablet 1    magnesium oxide (MGO) 400 (241.3 Mg) MG TABS tablet Take 1 tablet by mouth 2 times daily as needed (for cramping) 60 tablet 0    albuterol sulfate  (90 Base) MCG/ACT inhaler Inhale 2 puffs into the lungs every 6 hours as needed for Shortness of Breath 1 each 11    alendronate (FOSAMAX) 70 MG tablet Take 1 tablet by mouth every 7 days 12 tablet 1    Calcium Citrate-Vitamin D 500-500 MG-UNIT CHEW Take 1 tablet by mouth 2 times daily      atorvastatin (LIPITOR) 80 MG tablet TAKE 1 TABLET BY MOUTH EVERY DAY FOR CHOLESTEROL 90 tablet 1    Fluticasone furoate-vilanterol (BREO ELLIPTA) 200-25 MCG/INH AEPB inhaler Inhale 1 puff into the lungs daily 1 each 11    Blood Glucose Monitoring Suppl (ONE TOUCH ULTRA MINI) w/Device KIT 1 kit by Does not apply route daily 1 kit 0    ONE TOUCH LANCETS MISC 1 each by Does not apply route daily 100 each 3    blood glucose test strips (ASCENSIA AUTODISC VI;ONE TOUCH ULTRA TEST VI) strip 1 each by In Vitro route daily 100 each 3    ipratropium-albuterol (DUONEB) 0.5-2.5 (3) MG/3ML SOLN nebulizer solution Take 1 aerosol every 4 hours for 2 days and then as needed for shortness of breath coughing and wheezing 360 mL 3    Cholecalciferol (VITAMIN D) 2000 units TABS tablet Take 1 tablet by mouth daily 30 tablet     cetirizine (ZYRTEC) 10 MG tablet Take 10 mg by mouth daily as needed for Allergies      Nebulizers (COMPRESSOR/NEBULIZER) MISC 1 Units by Does not apply route 4 times daily 1 each 3     No current facility-administered medications for this visit. No Known Allergies    Review of Systems   Constitutional: Positive for activity change and fatigue. Respiratory: Positive for cough and shortness of breath. Negative for chest tightness. Cardiovascular: Negative for chest pain. Gastrointestinal: Negative for constipation, diarrhea, nausea and vomiting. Neurological: Negative for dizziness and headaches. Psychiatric/Behavioral: Negative for confusion. Vitals:    02/01/22 1258   BP: 112/70   Site: Right Upper Arm   Position: Sitting   Cuff Size: Medium Adult   Pulse: 70   Resp: 16   Temp: 98.4 °F (36.9 °C)   TempSrc: Oral   SpO2: 93%   Weight: 135 lb 9.6 oz (61.5 kg)   Height: 5' 4\" (1.626 m)       Body mass index is 23.28 kg/m². Wt Readings from Last 3 Encounters:   02/01/22 135 lb 9.6 oz (61.5 kg)   01/18/22 136 lb (61.7 kg)   12/29/21 136 lb 9.6 oz (62 kg)       BP Readings from Last 3 Encounters:   02/01/22 112/70   01/18/22 135/77   12/29/21 110/60       Physical Exam  Vitals and nursing note reviewed. Constitutional:       General: She is not in acute distress. Appearance: She is well-developed. HENT:      Head: Normocephalic and atraumatic. Eyes:      Extraocular Movements: Extraocular movements intact. Conjunctiva/sclera: Conjunctivae normal.   Cardiovascular:      Rate and Rhythm: Normal rate and regular rhythm. Heart sounds: Normal heart sounds. No murmur heard. No friction rub. No gallop. Pulmonary:      Effort: Pulmonary effort is normal. No respiratory distress. Breath sounds: Normal breath sounds. Musculoskeletal:      Cervical back: Neck supple. Right lower leg: No edema. Left lower leg: No edema. Skin:     General: Skin is warm and dry. Neurological:      Mental Status: She is alert and oriented to person, place, and time. Psychiatric:         Behavior: Behavior normal.         Thought Content: Thought content normal.         Judgment: Judgment normal.         Assessmentand Plan  Digna Collado was seen today for follow-up. Diagnoses and all orders for this visit:    COPD, severe (HCC)/ COPD with acute bronchitis ([facility] [address]  -     predniSONE (DELTASONE) 10 MG tablet; Take 40 mg by mouth for 3 days 30 mg for 3 days 20 mg for 3 days 10 mg for 3 days. -     azithromycin (ZITHROMAX Z-ROSALIO) 250 MG tablet;  Take 2 tablets on day 1, then 1 tablet daily for the next 4 days for treatment of bacterial infection. Continue albuterol inhaler PRN    Controlled type 2 diabetes mellitus without complication, without long-term current use of insulin (HCC)  -     POCT glycosylated hemoglobin (Hb A1C)- 5.8%  -     Comprehensive Metabolic Panel; Future  Continue to work on low carb diet and physical activity daily. Hypercholesteremia  -     LIPIDs controled 2/2021- repeat   Continue atorvastatin 80 mg daily. Advised to continue low fat/choleserol diet supplemented with aerobic exercise. Return in about 27 days (around 2/28/2022), or if symptoms worsen or fail to improve, for chronic conditions.

## 2022-02-15 ENCOUNTER — OFFICE VISIT (OUTPATIENT)
Dept: PAIN MANAGEMENT | Age: 77
End: 2022-02-15
Payer: MEDICARE

## 2022-02-15 VITALS
WEIGHT: 136 LBS | BODY MASS INDEX: 23.34 KG/M2 | HEART RATE: 76 BPM | SYSTOLIC BLOOD PRESSURE: 128 MMHG | DIASTOLIC BLOOD PRESSURE: 73 MMHG

## 2022-02-15 DIAGNOSIS — M47.816 LUMBAR SPONDYLOSIS: ICD-10-CM

## 2022-02-15 DIAGNOSIS — M51.37 DEGENERATION OF LUMBAR OR LUMBOSACRAL INTERVERTEBRAL DISC: ICD-10-CM

## 2022-02-15 DIAGNOSIS — M51.36 DDD (DEGENERATIVE DISC DISEASE), LUMBAR: ICD-10-CM

## 2022-02-15 DIAGNOSIS — M70.61 TROCHANTERIC BURSITIS OF RIGHT HIP: ICD-10-CM

## 2022-02-15 DIAGNOSIS — M79.7 FIBROMYALGIA: ICD-10-CM

## 2022-02-15 DIAGNOSIS — M47.26 OSTEOARTHRITIS OF SPINE WITH RADICULOPATHY, LUMBAR REGION: ICD-10-CM

## 2022-02-15 DIAGNOSIS — G89.4 CHRONIC PAIN SYNDROME: ICD-10-CM

## 2022-02-15 PROCEDURE — 3017F COLORECTAL CA SCREEN DOC REV: CPT | Performed by: INTERNAL MEDICINE

## 2022-02-15 PROCEDURE — 99213 OFFICE O/P EST LOW 20 MIN: CPT | Performed by: INTERNAL MEDICINE

## 2022-02-15 PROCEDURE — G8427 DOCREV CUR MEDS BY ELIG CLIN: HCPCS | Performed by: INTERNAL MEDICINE

## 2022-02-15 PROCEDURE — 1123F ACP DISCUSS/DSCN MKR DOCD: CPT | Performed by: INTERNAL MEDICINE

## 2022-02-15 PROCEDURE — 4004F PT TOBACCO SCREEN RCVD TLK: CPT | Performed by: INTERNAL MEDICINE

## 2022-02-15 PROCEDURE — 4040F PNEUMOC VAC/ADMIN/RCVD: CPT | Performed by: INTERNAL MEDICINE

## 2022-02-15 PROCEDURE — G8484 FLU IMMUNIZE NO ADMIN: HCPCS | Performed by: INTERNAL MEDICINE

## 2022-02-15 PROCEDURE — G8399 PT W/DXA RESULTS DOCUMENT: HCPCS | Performed by: INTERNAL MEDICINE

## 2022-02-15 PROCEDURE — G8420 CALC BMI NORM PARAMETERS: HCPCS | Performed by: INTERNAL MEDICINE

## 2022-02-15 PROCEDURE — 1090F PRES/ABSN URINE INCON ASSESS: CPT | Performed by: INTERNAL MEDICINE

## 2022-02-15 RX ORDER — OXYCODONE AND ACETAMINOPHEN 7.5; 325 MG/1; MG/1
1 TABLET ORAL EVERY 6 HOURS PRN
Qty: 112 TABLET | Refills: 0 | Status: SHIPPED | OUTPATIENT
Start: 2022-02-15 | End: 2022-03-15 | Stop reason: SDUPTHER

## 2022-02-15 RX ORDER — PANTOPRAZOLE SODIUM 40 MG/1
40 TABLET, DELAYED RELEASE ORAL DAILY
Qty: 30 TABLET | Refills: 1 | Status: SHIPPED | OUTPATIENT
Start: 2022-02-15 | End: 2022-03-15 | Stop reason: SDUPTHER

## 2022-02-15 NOTE — PROGRESS NOTES
[facility]  1945  [id number]    HISTORY OF PRESENT ILLNESS:  Ms. Odalis Byrd is a 78 y.o. female returns for a follow up visit for multiple medical problems. Her current presenting problems are   1. Chronic pain syndrome    2. Lumbar spondylosis    3. Degeneration of lumbar or lumbosacral intervertebral disc    4. DDD (degenerative disc disease), lumbar    5. Fibromyalgia    6. Trochanteric bursitis of right hip    7. Osteoarthritis of spine with radiculopathy, lumbar region    . As per information/history obtained from the PADT(patient assessment and documentation tool) - She complains of pain in the lower back with radiation to the buttocks She rates the pain 8/10 and describes it as aching. Pain is made worse by: standing. Current treatment regimen has helped relieve about 30% of the pain. She denies side effects from the current pain regimen. Patient reports that since the last follow up visit the physical functioning is unchanged, family/social relationships are unchanged, mood is worse and sleep patterns are unchanged, and that the overall functioning is unchanged. Patient denies neurological bowel or bladder. Patient denies misusing/abusing her narcotic pain medications or using any illegal drugs. There are No indicators for possible drug abuse, addiction or diversion problems. Upon obtaining the medical history from Ms. Odalis Byrd regarding today's office visit for her presenting problems, patient states she has been having pain in the lower back, it is going up to the waist line and having increased pain, she has been in tears. Ms. Odalis Byrd mentions she is using Percocet 4 per day along with the other adjuvants and Mobic. Patient mentions she is using Zanaflex for leg cramps which is helping with it. She says she was on Steroids for COPD issues. ALLERGIES: Patients list of allergies were reviewed     MEDICATIONS: Ms. Odalis Byrd list of medications were reviewed. Her current medications are apply route 4 times daily 1 each 3    oxyCODONE-acetaminophen (PERCOCET) 7.5-325 MG per tablet Take 1 tablet by mouth every 6 hours as needed for Pain for up to 28 days. Max 4 a day 112 tablet 0    pantoprazole (PROTONIX) 40 MG tablet Take 1 tablet by mouth daily 30 tablet 1     No facility-administered medications prior to visit. REVIEW OF SYSTEMS:    Respiratory: Negative for apnea, chest tightness and shortness of breath or change in baseline breathing. PHYSICAL EXAM:   Nursing note and vitals reviewed. /73   Pulse 76   Wt 136 lb (61.7 kg)   BMI 23.34 kg/m²   Constitutional: She appears well-developed and well-nourished. No acute distress. Cardiovascular: Normal rate, regular rhythm, normal heart sounds, and does not have murmur. Pulmonary/Chest: Effort normal. No respiratory distress. She has wheezes in the lung fields. She has no rales. Decreased air exchange. +rhonchi     Neurological/Psychiatric:She is alert and oriented to person, place, and time. Coordination is  normal.  Her mood isAppropriate and affect is Neutral/Euthymic(normal) . IMPRESSION:   1. Chronic pain syndrome    2. Lumbar spondylosis    3. Degeneration of lumbar or lumbosacral intervertebral disc    4. DDD (degenerative disc disease), lumbar    5. Fibromyalgia    6. Trochanteric bursitis of right hip    7. Osteoarthritis of spine with radiculopathy, lumbar region        PLAN:  Informed verbal consent was obtained  -ROM/Stretching exercises as advised   -She was advised to increase fluids ( 5-7  glasses of fluid daily), limit caffeine, avoid cheese products, increase dietary fiber, increase activity and exercise as tolerated and relax regularly and enjoy meals   -Continue with Percocet 4 per day  -Advised patient to quit smoking for  health related concerns and to improve the treatment outcomes.  Education was given on quitting smoking and the use of different modalities including medications, hypnotherapy, counselling  and biofeedback. These were discussed with patient.   -If back symptoms continues will consider TPI  -Start Tens unit daily      Current Outpatient Medications   Medication Sig Dispense Refill    oxyCODONE-acetaminophen (PERCOCET) 7.5-325 MG per tablet Take 1 tablet by mouth every 6 hours as needed for Pain for up to 28 days. Max 4 a day 112 tablet 0    pantoprazole (PROTONIX) 40 MG tablet Take 1 tablet by mouth daily 30 tablet 1    tiZANidine (ZANAFLEX) 4 MG tablet Take 1 tablet by mouth nightly 30 tablet 1    DULoxetine (CYMBALTA) 60 MG extended release capsule Take 1 capsule by mouth nightly 30 capsule 1    traZODone (DESYREL) 50 MG tablet Take 1-2 tablets by mouth nightly 60 tablet 1    gabapentin (NEURONTIN) 600 MG tablet Take 1 tablet by mouth 2 times daily for 30 days.  60 tablet 1    meloxicam (MOBIC) 15 MG tablet Take 1 tablet by mouth daily 30 tablet 1    magnesium oxide (MGO) 400 (241.3 Mg) MG TABS tablet Take 1 tablet by mouth 2 times daily as needed (for cramping) 60 tablet 0    albuterol sulfate  (90 Base) MCG/ACT inhaler Inhale 2 puffs into the lungs every 6 hours as needed for Shortness of Breath 1 each 11    alendronate (FOSAMAX) 70 MG tablet Take 1 tablet by mouth every 7 days 12 tablet 1    Calcium Citrate-Vitamin D 500-500 MG-UNIT CHEW Take 1 tablet by mouth 2 times daily      atorvastatin (LIPITOR) 80 MG tablet TAKE 1 TABLET BY MOUTH EVERY DAY FOR CHOLESTEROL 90 tablet 1    Fluticasone furoate-vilanterol (BREO ELLIPTA) 200-25 MCG/INH AEPB inhaler Inhale 1 puff into the lungs daily 1 each 11    Blood Glucose Monitoring Suppl (ONE TOUCH ULTRA MINI) w/Device KIT 1 kit by Does not apply route daily 1 kit 0    ONE TOUCH LANCETS MISC 1 each by Does not apply route daily 100 each 3    blood glucose test strips (ASCENSIA AUTODISC VI;ONE TOUCH ULTRA TEST VI) strip 1 each by In Vitro route daily 100 each 3    ipratropium-albuterol (DUONEB) 0.5-2.5 (3) MG/3ML SOLN nebulizer solution Take 1 aerosol every 4 hours for 2 days and then as needed for shortness of breath coughing and wheezing 360 mL 3    Cholecalciferol (VITAMIN D) 2000 units TABS tablet Take 1 tablet by mouth daily 30 tablet     cetirizine (ZYRTEC) 10 MG tablet Take 10 mg by mouth daily as needed for Allergies      Nebulizers (COMPRESSOR/NEBULIZER) MISC 1 Units by Does not apply route 4 times daily 1 each 3     No current facility-administered medications for this visit. I will continue her current medication regimen  which is part of the above treatment schedule. It has been helping with Ms. Winnie Kumari chronic  medical problems which for this visit include:   Diagnoses of Chronic pain syndrome, Lumbar spondylosis, Degeneration of lumbar or lumbosacral intervertebral disc, DDD (degenerative disc disease), lumbar, Fibromyalgia, Trochanteric bursitis of right hip, and Osteoarthritis of spine with radiculopathy, lumbar region were pertinent to this visit. Risks and benefits of the medications and other alternative treatments  including no treatment were discussed with the patient. The common side effects of these medications were also explained to the patient. Informed verbal consent was obtained. Goals of current treatment regimen include improvement in pain, restoration of functioning- with focus on improvement in physical performance, general activity, work or disability,emotional distress, health care utilization and  decreased opioid medication consumption- titrating to the lowest effective dose. Will continue to monitor progress towards achieving/maintaining therapeutic goals with special emphasis on  1. Improvement in perceived interfernce  of pain with ADL's. Ability to do home exercises independently. Ability to do household chores indoor and/or outdoor work and social and leisure activities. Improve psychosocial and physical functioning. - she is showing progression towards this treatment goal with the current regimen. She was advised against drinking alcohol with the narcotic pain medicines, advised against driving or handling machinery while adjusting the dose of medicines or if having cognitive  issues related to the current medications. Risk of overdose and death, if medicines not taken as prescribed, were also discussed. If the patient develops new symptoms or if the symptoms worsen, the patient should call the office. While transcribing every attempt was made to maintain the accuracy of the note in terms of it's contents,there may have been some errors made inadvertently. Thank you for allowing me to participate in the care of this patient.     Krzysztof Goins MD.    Cc: HECTOR Whitley - CNP

## 2022-02-17 DIAGNOSIS — E11.9 CONTROLLED TYPE 2 DIABETES MELLITUS WITHOUT COMPLICATION, WITHOUT LONG-TERM CURRENT USE OF INSULIN (HCC): ICD-10-CM

## 2022-02-17 DIAGNOSIS — E78.00 HYPERCHOLESTEREMIA: ICD-10-CM

## 2022-02-17 LAB
A/G RATIO: 1.7 (ref 1.1–2.2)
ALBUMIN SERPL-MCNC: 4.1 G/DL (ref 3.4–5)
ALP BLD-CCNC: 70 U/L (ref 40–129)
ALT SERPL-CCNC: 13 U/L (ref 10–40)
ANION GAP SERPL CALCULATED.3IONS-SCNC: 12 MMOL/L (ref 3–16)
AST SERPL-CCNC: 11 U/L (ref 15–37)
BILIRUB SERPL-MCNC: 0.4 MG/DL (ref 0–1)
BUN BLDV-MCNC: 13 MG/DL (ref 7–20)
CALCIUM SERPL-MCNC: 9 MG/DL (ref 8.3–10.6)
CHLORIDE BLD-SCNC: 103 MMOL/L (ref 99–110)
CHOLESTEROL, TOTAL: 171 MG/DL (ref 0–199)
CO2: 26 MMOL/L (ref 21–32)
CREAT SERPL-MCNC: 0.8 MG/DL (ref 0.6–1.2)
GFR AFRICAN AMERICAN: >60
GFR NON-AFRICAN AMERICAN: >60
GLUCOSE BLD-MCNC: 113 MG/DL (ref 70–99)
HDLC SERPL-MCNC: 59 MG/DL (ref 40–60)
LDL CHOLESTEROL CALCULATED: 83 MG/DL
POTASSIUM SERPL-SCNC: 4.3 MMOL/L (ref 3.5–5.1)
SODIUM BLD-SCNC: 141 MMOL/L (ref 136–145)
TOTAL PROTEIN: 6.5 G/DL (ref 6.4–8.2)
TRIGL SERPL-MCNC: 146 MG/DL (ref 0–150)
VLDLC SERPL CALC-MCNC: 29 MG/DL

## 2022-02-22 ENCOUNTER — TELEPHONE (OUTPATIENT)
Dept: CASE MANAGEMENT | Age: 77
End: 2022-02-22

## 2022-02-25 DIAGNOSIS — E78.00 HYPERCHOLESTEREMIA: ICD-10-CM

## 2022-02-25 RX ORDER — ATORVASTATIN CALCIUM 80 MG/1
TABLET, FILM COATED ORAL
Qty: 90 TABLET | Refills: 1 | Status: SHIPPED | OUTPATIENT
Start: 2022-02-25 | End: 2022-06-03 | Stop reason: SDUPTHER

## 2022-02-28 ENCOUNTER — OFFICE VISIT (OUTPATIENT)
Dept: FAMILY MEDICINE CLINIC | Age: 77
End: 2022-02-28
Payer: MEDICARE

## 2022-02-28 VITALS
RESPIRATION RATE: 16 BRPM | OXYGEN SATURATION: 95 % | WEIGHT: 135.8 LBS | DIASTOLIC BLOOD PRESSURE: 70 MMHG | HEART RATE: 92 BPM | SYSTOLIC BLOOD PRESSURE: 122 MMHG | TEMPERATURE: 98.5 F | BODY MASS INDEX: 23.18 KG/M2 | HEIGHT: 64 IN

## 2022-02-28 DIAGNOSIS — Z13.31 POSITIVE DEPRESSION SCREENING: ICD-10-CM

## 2022-02-28 DIAGNOSIS — J44.9 COPD, SEVERE (HCC): ICD-10-CM

## 2022-02-28 DIAGNOSIS — E11.9 CONTROLLED TYPE 2 DIABETES MELLITUS WITHOUT COMPLICATION, WITHOUT LONG-TERM CURRENT USE OF INSULIN (HCC): ICD-10-CM

## 2022-02-28 DIAGNOSIS — M81.0 AGE-RELATED OSTEOPOROSIS WITHOUT CURRENT PATHOLOGICAL FRACTURE: ICD-10-CM

## 2022-02-28 DIAGNOSIS — E78.00 HYPERCHOLESTEREMIA: ICD-10-CM

## 2022-02-28 DIAGNOSIS — K21.9 GASTROESOPHAGEAL REFLUX DISEASE WITHOUT ESOPHAGITIS: ICD-10-CM

## 2022-02-28 PROCEDURE — G8427 DOCREV CUR MEDS BY ELIG CLIN: HCPCS | Performed by: NURSE PRACTITIONER

## 2022-02-28 PROCEDURE — 99214 OFFICE O/P EST MOD 30 MIN: CPT | Performed by: NURSE PRACTITIONER

## 2022-02-28 PROCEDURE — 4040F PNEUMOC VAC/ADMIN/RCVD: CPT | Performed by: NURSE PRACTITIONER

## 2022-02-28 PROCEDURE — 4004F PT TOBACCO SCREEN RCVD TLK: CPT | Performed by: NURSE PRACTITIONER

## 2022-02-28 PROCEDURE — 2022F DILAT RTA XM EVC RTNOPTHY: CPT | Performed by: NURSE PRACTITIONER

## 2022-02-28 PROCEDURE — G8420 CALC BMI NORM PARAMETERS: HCPCS | Performed by: NURSE PRACTITIONER

## 2022-02-28 PROCEDURE — G8399 PT W/DXA RESULTS DOCUMENT: HCPCS | Performed by: NURSE PRACTITIONER

## 2022-02-28 PROCEDURE — 1123F ACP DISCUSS/DSCN MKR DOCD: CPT | Performed by: NURSE PRACTITIONER

## 2022-02-28 PROCEDURE — 1090F PRES/ABSN URINE INCON ASSESS: CPT | Performed by: NURSE PRACTITIONER

## 2022-02-28 PROCEDURE — 3044F HG A1C LEVEL LT 7.0%: CPT | Performed by: NURSE PRACTITIONER

## 2022-02-28 PROCEDURE — 3017F COLORECTAL CA SCREEN DOC REV: CPT | Performed by: NURSE PRACTITIONER

## 2022-02-28 PROCEDURE — 3023F SPIROM DOC REV: CPT | Performed by: NURSE PRACTITIONER

## 2022-02-28 PROCEDURE — G8484 FLU IMMUNIZE NO ADMIN: HCPCS | Performed by: NURSE PRACTITIONER

## 2022-02-28 RX ORDER — PREDNISONE 10 MG/1
TABLET ORAL
Qty: 30 TABLET | Refills: 0 | Status: SHIPPED | OUTPATIENT
Start: 2022-02-28 | End: 2022-03-10

## 2022-02-28 ASSESSMENT — ENCOUNTER SYMPTOMS
WHEEZING: 1
EYE ITCHING: 0
ABDOMINAL PAIN: 0
SHORTNESS OF BREATH: 1
CHEST TIGHTNESS: 0
COUGH: 1
SINUS PAIN: 0

## 2022-02-28 ASSESSMENT — PATIENT HEALTH QUESTIONNAIRE - PHQ9
4. FEELING TIRED OR HAVING LITTLE ENERGY: 3
7. TROUBLE CONCENTRATING ON THINGS, SUCH AS READING THE NEWSPAPER OR WATCHING TELEVISION: 0
6. FEELING BAD ABOUT YOURSELF - OR THAT YOU ARE A FAILURE OR HAVE LET YOURSELF OR YOUR FAMILY DOWN: 0
9. THOUGHTS THAT YOU WOULD BE BETTER OFF DEAD, OR OF HURTING YOURSELF: 0
SUM OF ALL RESPONSES TO PHQ QUESTIONS 1-9: 10
1. LITTLE INTEREST OR PLEASURE IN DOING THINGS: 2
10. IF YOU CHECKED OFF ANY PROBLEMS, HOW DIFFICULT HAVE THESE PROBLEMS MADE IT FOR YOU TO DO YOUR WORK, TAKE CARE OF THINGS AT HOME, OR GET ALONG WITH OTHER PEOPLE: 1
SUM OF ALL RESPONSES TO PHQ QUESTIONS 1-9: 10
SUM OF ALL RESPONSES TO PHQ9 QUESTIONS 1 & 2: 4
5. POOR APPETITE OR OVEREATING: 0
3. TROUBLE FALLING OR STAYING ASLEEP: 0
8. MOVING OR SPEAKING SO SLOWLY THAT OTHER PEOPLE COULD HAVE NOTICED. OR THE OPPOSITE, BEING SO FIGETY OR RESTLESS THAT YOU HAVE BEEN MOVING AROUND A LOT MORE THAN USUAL: 3
SUM OF ALL RESPONSES TO PHQ QUESTIONS 1-9: 10
SUM OF ALL RESPONSES TO PHQ QUESTIONS 1-9: 10
2. FEELING DOWN, DEPRESSED OR HOPELESS: 2

## 2022-02-28 NOTE — PROGRESS NOTES
2/28/2022    This is a 78 y.o. female   Chief Complaint   Patient presents with    Diabetes     has not been checking sugar. last a1c was 2/1/2022.  5.8   . HPI  This is a 79 y/o female presenting for follow-up of hyperlipidemia, DM, GERD, COPD, osteopenia,    Hyperlipidemia: Tolerating Lipitor well. No new myalgias or GI upset on atorvastatin (Lipitor). Tries to maintain low fat diet supplemented with mild exercise. GERD: Stable on daily PPI     COPD: On breo inhaler daily. She is using her albuterol inhaler 1-2 times per day. Was treated for COPD exacerbation with prednisone taper and zpak earlier in the month. She reports that she is feeling better, but continues with wheezing and shortness of breath. Continues to smoke 1 ppd. Osteoporosis: Last DEXA was 9/2021. Taking calcium and vitamin D supplement daily and alendronate weekly. Insomnia: Denies issues. On trazodone. Diabetes Mellitus Type 2: Current symptoms/problems include none. Home blood sugar records: patient does not test  Any episodes of hypoglycemia? no  Eye exam current (within one year): no   reports that she has been smoking cigarettes. She started smoking about 60 years ago. She has a 55.00 pack-year smoking history.  She has never used smokeless tobacco.       Lab Results   Component Value Date    LABA1C 5.8 02/01/2022    LABA1C 5.7 08/26/2021    LABA1C 5.9 02/22/2021     Lab Results   Component Value Date    LABMICR <1.20 04/20/2021    CREATININE 0.8 02/17/2022     Lab Results   Component Value Date    ALT 13 02/17/2022    AST 11 (L) 02/17/2022     Lab Results   Component Value Date    CHOL 171 02/17/2022    TRIG 146 02/17/2022    HDL 59 02/17/2022    LDLCALC 83 02/17/2022    LDLDIRECT 176 (H) 10/12/2012           Patient Active Problem List   Diagnosis    Osteopenia-last dexa 2009 repeat 2015 (-2.4 hip)--advised tx    Colon polyp, hyperplastic,-(done 3/11-repeat 3-5 yrs--dr Nils Macdonald)    Family history of colon cancer  CTS (carpal tunnel syndrome)BILATERAL-s/p surgical repair--resolved     Postmenopausal status-last mammogram 2/15 wnl last pap 12/13--wnl    Nondependent alcohol abuse, in remission    Urticaria, chronic    Tobacco abuse-advised to quit--lung cancer screening neg 5/18--repeat low dose ct 1 yr    Chronic back pain-(djd) saw dr Brooks Diss afford surgery--seeing dr Laith Rush for pain meds    Fibromyalgia    Hypercholesteremia    Lumbar spondylosis    Vitamin D deficiency--severe--started 50,000 iu 2x/ wk 11/13    Insomnia--on elevil w help    Constipation--on daily miralax    Depression    Chronic pain syndrome    Muscle cramping    Gastroesophageal reflux disease    COPD, moderate (HCC)    Chronic hypoxemic respiratory failure (HCC)    Degeneration of lumbar or lumbosacral intervertebral disc    Trochanteric bursitis of right hip    New onset type 2 diabetes mellitus ([address]    Controlled type 2 diabetes mellitus without complication, without long-term current use of insulin ([address]    Age-related osteoporosis without current pathological fracture- started alendronate 9/2021          Current Outpatient Medications   Medication Sig Dispense Refill    atorvastatin (LIPITOR) 80 MG tablet TAKE 1 TABLET BY MOUTH EVERY DAY FOR CHOLESTEROL 90 tablet 1    oxyCODONE-acetaminophen (PERCOCET) 7.5-325 MG per tablet Take 1 tablet by mouth every 6 hours as needed for Pain for up to 28 days.  Max 4 a day 112 tablet 0    pantoprazole (PROTONIX) 40 MG tablet Take 1 tablet by mouth daily 30 tablet 1    tiZANidine (ZANAFLEX) 4 MG tablet Take 1 tablet by mouth nightly 30 tablet 1    DULoxetine (CYMBALTA) 60 MG extended release capsule Take 1 capsule by mouth nightly 30 capsule 1    traZODone (DESYREL) 50 MG tablet Take 1-2 tablets by mouth nightly 60 tablet 1    meloxicam (MOBIC) 15 MG tablet Take 1 tablet by mouth daily 30 tablet 1    magnesium oxide (MGO) 400 (241.3 Mg) MG TABS tablet Take 1 tablet by mouth 2 times daily as needed (for cramping) 60 tablet 0    albuterol sulfate  (90 Base) MCG/ACT inhaler Inhale 2 puffs into the lungs every 6 hours as needed for Shortness of Breath 1 each 11    alendronate (FOSAMAX) 70 MG tablet Take 1 tablet by mouth every 7 days 12 tablet 1    Calcium Citrate-Vitamin D 500-500 MG-UNIT CHEW Take 1 tablet by mouth 2 times daily      Fluticasone furoate-vilanterol (BREO ELLIPTA) 200-25 MCG/INH AEPB inhaler Inhale 1 puff into the lungs daily 1 each 11    Blood Glucose Monitoring Suppl (ONE TOUCH ULTRA MINI) w/Device KIT 1 kit by Does not apply route daily 1 kit 0    ONE TOUCH LANCETS MISC 1 each by Does not apply route daily 100 each 3    blood glucose test strips (ASCENSIA AUTODISC VI;ONE TOUCH ULTRA TEST VI) strip 1 each by In Vitro route daily 100 each 3    ipratropium-albuterol (DUONEB) 0.5-2.5 (3) MG/3ML SOLN nebulizer solution Take 1 aerosol every 4 hours for 2 days and then as needed for shortness of breath coughing and wheezing 360 mL 3    Cholecalciferol (VITAMIN D) 2000 units TABS tablet Take 1 tablet by mouth daily 30 tablet     cetirizine (ZYRTEC) 10 MG tablet Take 10 mg by mouth daily as needed for Allergies      Nebulizers (COMPRESSOR/NEBULIZER) MISC 1 Units by Does not apply route 4 times daily 1 each 3    gabapentin (NEURONTIN) 600 MG tablet Take 1 tablet by mouth 2 times daily for 30 days. 60 tablet 1     No current facility-administered medications for this visit. No Known Allergies    Review of Systems   Constitutional: Positive for fatigue. Negative for activity change and fever. HENT: Negative for congestion and sinus pain. Eyes: Negative for itching. Respiratory: Positive for cough, shortness of breath and wheezing. Negative for chest tightness. Cardiovascular: Negative for chest pain, palpitations and leg swelling. Gastrointestinal: Negative for abdominal pain.    Genitourinary: Negative for difficulty urinating, dysuria, frequency, hematuria and pelvic pain. Musculoskeletal: Positive for arthralgias and myalgias. Neurological: Negative for dizziness, syncope, weakness, numbness and headaches. Psychiatric/Behavioral: Negative for confusion. All other systems reviewed and are negative. Vitals:    02/28/22 1009   BP: 122/70   Site: Right Upper Arm   Position: Sitting   Cuff Size: Medium Adult   Pulse: 92   Resp: 16   Temp: 98.5 °F (36.9 °C)   TempSrc: Oral   SpO2: 95%   Weight: 135 lb 12.8 oz (61.6 kg)   Height: 5' 4\" (1.626 m)       Body mass index is 23.31 kg/m². Wt Readings from Last 3 Encounters:   02/28/22 135 lb 12.8 oz (61.6 kg)   02/15/22 136 lb (61.7 kg)   02/01/22 135 lb 9.6 oz (61.5 kg)       BP Readings from Last 3 Encounters:   02/28/22 122/70   02/15/22 128/73   02/01/22 112/70       Physical Exam  Vitals and nursing note reviewed. Constitutional:       General: She is not in acute distress. Appearance: She is well-developed. HENT:      Head: Normocephalic and atraumatic. Eyes:      Extraocular Movements: Extraocular movements intact. Conjunctiva/sclera: Conjunctivae normal.   Cardiovascular:      Rate and Rhythm: Normal rate and regular rhythm. Heart sounds: Normal heart sounds. No murmur heard. No friction rub. No gallop. Pulmonary:      Effort: Pulmonary effort is normal. No respiratory distress. Breath sounds: Wheezing present. Comments: Wheezing throughout   Musculoskeletal:      Cervical back: Neck supple. Right lower leg: No edema. Left lower leg: No edema. Skin:     General: Skin is warm and dry. Neurological:      Mental Status: She is alert and oriented to person, place, and time. Psychiatric:         Behavior: Behavior normal.         Thought Content: Thought content normal.         Judgment: Judgment normal.         Assessmentand Plan  Gisella Gregorio was seen today for follow-up.     Diagnoses and all orders for this visit:    COPD, moderate ([address]): continues with wheezing   Will treat with another prednisone taper. Advised to f/u with pulmonologist Dr. Gisell Stephenson and albuterol inhaler BID prn and nebulizer PRN  Smoking cessation discussed and needed. Hypercholesteremia  -     LIPIDs controled 2/2022  Continue atorvastatin 80 mg daily. Advised to continue low fat/choleserol diet supplemented with aerobic exercise. Gastroesophageal reflux disease, esophagitis presence not specified  Continue Protonix daily. Advised to avoid foods associated with acid reflux. DM type 2: diet controlled   -     Hemoglobin A1C- 5.7% on 2/1/22  Advised to continue low carb/fat diet supplemented with aerobic exercise. Osteoporosis-   Continue calcium and vitamin D supplements and alendronate weekly   Last DEXA scan 9/2021. Repeat in 2 years. Tobacco abuse-advised to quit--lung cancer screening neg 5/18--repeat low dose ct 1 yr  She is back smoking at least a pack per day. Quit 1/1/20. Advised smoking cessation again. She does not want to quit smoking at this time. PHQ-9 score today: (PHQ-9 Total Score: 10), additional evaluation and assessment performed, follow-up plan includes but not limited to: continue cymbalta 60 mg daily. Can consider increasing to 60 mg BID in future if needed. Will treat her COPD with wheezing first to help her breathing. Return in about 6 weeks (around 4/11/2022), or if symptoms worsen or fail to improve, for mood.

## 2022-03-03 ENCOUNTER — OFFICE VISIT (OUTPATIENT)
Dept: PULMONOLOGY | Age: 77
End: 2022-03-03
Payer: MEDICARE

## 2022-03-03 VITALS
HEART RATE: 84 BPM | SYSTOLIC BLOOD PRESSURE: 160 MMHG | HEIGHT: 64 IN | OXYGEN SATURATION: 94 % | WEIGHT: 135.2 LBS | DIASTOLIC BLOOD PRESSURE: 80 MMHG | TEMPERATURE: 94.7 F | BODY MASS INDEX: 23.08 KG/M2

## 2022-03-03 DIAGNOSIS — J44.9 COPD, MODERATE (HCC): ICD-10-CM

## 2022-03-03 DIAGNOSIS — J44.9 COPD, SEVERE (HCC): ICD-10-CM

## 2022-03-03 DIAGNOSIS — Z72.0 TOBACCO ABUSE: ICD-10-CM

## 2022-03-03 DIAGNOSIS — Z87.891 PERSONAL HISTORY OF TOBACCO USE: Primary | ICD-10-CM

## 2022-03-03 PROCEDURE — G8420 CALC BMI NORM PARAMETERS: HCPCS | Performed by: INTERNAL MEDICINE

## 2022-03-03 PROCEDURE — G8427 DOCREV CUR MEDS BY ELIG CLIN: HCPCS | Performed by: INTERNAL MEDICINE

## 2022-03-03 PROCEDURE — 3023F SPIROM DOC REV: CPT | Performed by: INTERNAL MEDICINE

## 2022-03-03 PROCEDURE — 4004F PT TOBACCO SCREEN RCVD TLK: CPT | Performed by: INTERNAL MEDICINE

## 2022-03-03 PROCEDURE — 1090F PRES/ABSN URINE INCON ASSESS: CPT | Performed by: INTERNAL MEDICINE

## 2022-03-03 PROCEDURE — 99214 OFFICE O/P EST MOD 30 MIN: CPT | Performed by: INTERNAL MEDICINE

## 2022-03-03 PROCEDURE — 4040F PNEUMOC VAC/ADMIN/RCVD: CPT | Performed by: INTERNAL MEDICINE

## 2022-03-03 PROCEDURE — 3017F COLORECTAL CA SCREEN DOC REV: CPT | Performed by: INTERNAL MEDICINE

## 2022-03-03 PROCEDURE — 1123F ACP DISCUSS/DSCN MKR DOCD: CPT | Performed by: INTERNAL MEDICINE

## 2022-03-03 PROCEDURE — G8399 PT W/DXA RESULTS DOCUMENT: HCPCS | Performed by: INTERNAL MEDICINE

## 2022-03-03 PROCEDURE — G0296 VISIT TO DETERM LDCT ELIG: HCPCS | Performed by: INTERNAL MEDICINE

## 2022-03-03 PROCEDURE — G8484 FLU IMMUNIZE NO ADMIN: HCPCS | Performed by: INTERNAL MEDICINE

## 2022-03-03 RX ORDER — FLUTICASONE FUROATE, UMECLIDINIUM BROMIDE AND VILANTEROL TRIFENATATE 200; 62.5; 25 UG/1; UG/1; UG/1
1 POWDER RESPIRATORY (INHALATION) DAILY
Qty: 1 EACH | Refills: 0 | COMMUNITY
Start: 2022-03-03 | End: 2022-04-04 | Stop reason: SDUPTHER

## 2022-03-03 RX ORDER — BUDESONIDE, GLYCOPYRROLATE, AND FORMOTEROL FUMARATE 160; 9; 4.8 UG/1; UG/1; UG/1
2 AEROSOL, METERED RESPIRATORY (INHALATION)
Qty: 1 EACH | Refills: 0 | COMMUNITY
Start: 2022-03-03 | End: 2022-04-22

## 2022-03-03 NOTE — PROGRESS NOTES
REASON FOR CONSULTATION/CC: COPD          PCP: Malu Mejia, APRN - CNP    HISTORY OF PRESENT ILLNESS: Willie Epps is a 78y.o. year old female with a history of COPD who presents :           COPD Assessment Test (CAT)                                     Copd      Having increased wheezing. Started on prednisone ~ 1 month ago then again on last appointment. Prednisone helps. Objective:   PHYSICAL EXAM:  Blood pressure (!) 160/80, pulse 84, temperature 94.7 °F (34.8 °C), temperature source Oral, height 5' 4\" (1.626 m), weight 135 lb 3.2 oz (61.3 kg), SpO2 94 %, not currently breastfeeding.'    Gen: No distress. Eyes:    ENT:    Neck:    Resp: No accessory muscle use. No crackles. Few wheezes. No rhonchi. CV: Regular rate. Regular rhythm. No murmur or rub. No edema. GI:    Skin:    Lymph:    M/S: No cyanosis. No clubbing. Neuro: Moves all four extremities. Psych: Oriented x 3. No anxiety. Awake. Alert. Intact judgement and insight. Data Reviewed:        Assessment:     COPD, Moderately severe FEV1 59%  Tobacco abuse. Pulmonary nodules, stable    Plan:        Problem List Items Addressed This Visit     Tobacco abuse-advised to quit--lung cancer screening neg 5/18--repeat low dose ct 1 yr      Continues to smoke. She knows she needs to quit but not ready to quit.     lung cancer screening ordered         COPD, moderate ([address]      [place] [facility] [place] [employer] . Both discussed. Sample of both given. She will contact me with which is better    Advised to use Breztri with spacer. Education give about proper use of medication.           Relevant Medications    Fluticasone-Umeclidin-Vilant (TRELEGY ELLIPTA) 200-62.5-25 MCG/INH AEPB    Budeson-Glycopyrrol-Formoterol (BREZTRI AEROSPHERE) 160-9-4.8 MCG/ACT AERO      Other Visit Diagnoses     Personal history of tobacco use    -  Primary    Relevant Orders    LA VISIT TO DISCUSS LUNG CA SCREEN W LDCT (Completed)    CT Lung Screen (Annual)    COPD, severe ([address]        Relevant Medications    Fluticasone-Umeclidin-Vilant (TRELEGY ELLIPTA) 200-62.5-25 MCG/INH AEPB    Budeson-Glycopyrrol-Formoterol (BREZTRI AEROSPHERE) 160-9-4.8 MCG/ACT AERO        This note was transcribed using [address] [employer]. Please disregard any translational errors. Low Dose CT (LDCT) Lung Screening criteria met:     Age 55-77(Medicare) or 50-80 ([facility])   Pack year smoking >30 (Medicare) or >20 ([facility])   Still smoking or less than 15 year since quit   No sign or symptoms of lung cancer   > 11 months since last LDCT     Risks and benefits of lung cancer screening with LDCT scans discussed:    Significance of positive screen - False-positive LDCT results often occur. 95% of all positive results do not lead to a diagnosis of cancer. Usually further imaging can resolve most false-positive results; however, some patients may require invasive procedures. Over diagnosis risk - 10% to 12% of screen-detected lung cancer cases are over diagnosed--that is, the cancer would not have been detected in the patient's lifetime without the screening. Need for follow up screens annually to continue lung cancer screening effectiveness     Risks associated with radiation from annual LDCT- Radiation exposure is about the same as for a mammogram, which is about 1/3 of the annual background radiation exposure from everyday life. Starting screening at age 54 is not likely to increase cancer risk from radiation exposure. Patients with comorbidities resulting in life expectancy of < 10 years, or that would preclude treatment of an abnormality identified on CT, should not be screened due to lack of benefit.     To obtain maximal benefit from this screening, smoking cessation and long-term abstinence from smoking is critical

## 2022-03-03 NOTE — PROGRESS NOTES
One Sample: Trelegy: Lot # [id number]                              Exp: Jan 2023    One Sample: DigePrint  Lot # [id number]                             Exp: 8/ 2023

## 2022-03-03 NOTE — ASSESSMENT & PLAN NOTE
Continues to smoke.   She knows she needs to quit but not ready to quit.     lung cancer screening ordered

## 2022-03-03 NOTE — PATIENT INSTRUCTIONS
Please call [phone] to schedule test CT chest, Echocardiogram, etc.            What is lung cancer screening? Lung cancer screening is a way in which doctors check the lungs for early signs of cancer in people who have no symptoms of lung cancer. A low-dose CT scan uses much less radiation than a normal CT scan and shows a more detailed image of the lungs than a standard X-ray. The goal of lung cancer screening is to find cancer early, before it has a chance to grow, spread, or cause problems. One large study found that smokers who were screened with low-dose CT scans were less likely to die of lung cancer than those who were screened with standard X-ray. Below is a summary of the things you need to know regarding screening for lung cancer with low-dose computed tomography (LDCT). This is a screening program that involves routine annual screening with LDCT studies of the lung. The LDCTs are done using low-dose radiation that is not thought to increase your cancer risk. If you have other serious medical conditions (other cancers, congestive heart failure) that limit your life expectancy to less than 10 years, you should not undergo lung cancer screening with LDCT. The chance is 20%-60% that the LDCT result will show abnormalities. This would require additional testing which could include repeat imaging or even invasive procedures. Most (about 95%) of \"abnormal\" LDCT results are false in the sense that no lung cancer is ultimately found. Additionally, some (about 10%) of the cancers found would not affect your life expectancy, even if undetected and untreated. If you are still smoking, the single most important thing that you can do to reduce your risk of dying of lung cancer is to quit. For this screening to be covered by Medicare and most other insurers, strict criteria must be met.   If you do not meet these criteria, but still wish to undergo LDCT testing, you will be required to sign a waiver indicating your willingness to pay for the scan.

## 2022-03-03 NOTE — ASSESSMENT & PLAN NOTE
Step up from [facility] to [facility] or [facility] . Both discussed. Sample of both given. She will contact me with which is better    Advised to use Breztri with spacer. Education give about proper use of medication.

## 2022-03-08 DIAGNOSIS — M81.0 AGE-RELATED OSTEOPOROSIS WITHOUT CURRENT PATHOLOGICAL FRACTURE: ICD-10-CM

## 2022-03-08 RX ORDER — ALENDRONATE SODIUM 70 MG/1
TABLET ORAL
Qty: 12 TABLET | Refills: 1 | Status: SHIPPED | OUTPATIENT
Start: 2022-03-08 | End: 2022-08-25

## 2022-03-15 ENCOUNTER — OFFICE VISIT (OUTPATIENT)
Dept: PAIN MANAGEMENT | Age: 77
End: 2022-03-15
Payer: MEDICARE

## 2022-03-15 VITALS
OXYGEN SATURATION: 91 % | HEART RATE: 80 BPM | TEMPERATURE: 97 F | WEIGHT: 137 LBS | DIASTOLIC BLOOD PRESSURE: 75 MMHG | BODY MASS INDEX: 23.39 KG/M2 | HEIGHT: 64 IN | RESPIRATION RATE: 18 BRPM | SYSTOLIC BLOOD PRESSURE: 134 MMHG

## 2022-03-15 DIAGNOSIS — G89.4 CHRONIC PAIN SYNDROME: ICD-10-CM

## 2022-03-15 DIAGNOSIS — M70.61 TROCHANTERIC BURSITIS OF RIGHT HIP: ICD-10-CM

## 2022-03-15 DIAGNOSIS — R25.2 MUSCLE CRAMPING: ICD-10-CM

## 2022-03-15 DIAGNOSIS — M51.36 DDD (DEGENERATIVE DISC DISEASE), LUMBAR: ICD-10-CM

## 2022-03-15 DIAGNOSIS — M79.7 FIBROMYALGIA: ICD-10-CM

## 2022-03-15 DIAGNOSIS — M47.26 OSTEOARTHRITIS OF SPINE WITH RADICULOPATHY, LUMBAR REGION: ICD-10-CM

## 2022-03-15 DIAGNOSIS — M47.816 LUMBAR SPONDYLOSIS: ICD-10-CM

## 2022-03-15 DIAGNOSIS — M51.37 DEGENERATION OF LUMBAR OR LUMBOSACRAL INTERVERTEBRAL DISC: ICD-10-CM

## 2022-03-15 PROCEDURE — G8484 FLU IMMUNIZE NO ADMIN: HCPCS | Performed by: INTERNAL MEDICINE

## 2022-03-15 PROCEDURE — 4004F PT TOBACCO SCREEN RCVD TLK: CPT | Performed by: INTERNAL MEDICINE

## 2022-03-15 PROCEDURE — G8399 PT W/DXA RESULTS DOCUMENT: HCPCS | Performed by: INTERNAL MEDICINE

## 2022-03-15 PROCEDURE — G8427 DOCREV CUR MEDS BY ELIG CLIN: HCPCS | Performed by: INTERNAL MEDICINE

## 2022-03-15 PROCEDURE — 99213 OFFICE O/P EST LOW 20 MIN: CPT | Performed by: INTERNAL MEDICINE

## 2022-03-15 PROCEDURE — 4040F PNEUMOC VAC/ADMIN/RCVD: CPT | Performed by: INTERNAL MEDICINE

## 2022-03-15 PROCEDURE — 20553 NJX 1/MLT TRIGGER POINTS 3/>: CPT | Performed by: INTERNAL MEDICINE

## 2022-03-15 PROCEDURE — 1090F PRES/ABSN URINE INCON ASSESS: CPT | Performed by: INTERNAL MEDICINE

## 2022-03-15 PROCEDURE — 3017F COLORECTAL CA SCREEN DOC REV: CPT | Performed by: INTERNAL MEDICINE

## 2022-03-15 PROCEDURE — G8420 CALC BMI NORM PARAMETERS: HCPCS | Performed by: INTERNAL MEDICINE

## 2022-03-15 PROCEDURE — 1123F ACP DISCUSS/DSCN MKR DOCD: CPT | Performed by: INTERNAL MEDICINE

## 2022-03-15 RX ORDER — TRIAMCINOLONE ACETONIDE 40 MG/ML
40 INJECTION, SUSPENSION INTRA-ARTICULAR; INTRAMUSCULAR ONCE
Status: COMPLETED | OUTPATIENT
Start: 2022-03-15 | End: 2022-03-15

## 2022-03-15 RX ORDER — DULOXETIN HYDROCHLORIDE 60 MG/1
60 CAPSULE, DELAYED RELEASE ORAL NIGHTLY
Qty: 30 CAPSULE | Refills: 1 | Status: SHIPPED | OUTPATIENT
Start: 2022-03-15 | End: 2022-04-22

## 2022-03-15 RX ORDER — OXYCODONE AND ACETAMINOPHEN 7.5; 325 MG/1; MG/1
1 TABLET ORAL EVERY 6 HOURS PRN
Qty: 112 TABLET | Refills: 0 | Status: SHIPPED | OUTPATIENT
Start: 2022-03-15 | End: 2022-04-12 | Stop reason: SDUPTHER

## 2022-03-15 RX ORDER — GABAPENTIN 600 MG/1
600 TABLET ORAL 2 TIMES DAILY
Qty: 60 TABLET | Refills: 1 | Status: SHIPPED | OUTPATIENT
Start: 2022-03-15 | End: 2022-04-12 | Stop reason: SDUPTHER

## 2022-03-15 RX ORDER — TIZANIDINE 4 MG/1
4 TABLET ORAL NIGHTLY
Qty: 30 TABLET | Refills: 1 | Status: SHIPPED | OUTPATIENT
Start: 2022-03-15 | End: 2022-04-12 | Stop reason: SDUPTHER

## 2022-03-15 RX ORDER — MELOXICAM 15 MG/1
15 TABLET ORAL DAILY
Qty: 30 TABLET | Refills: 1 | Status: SHIPPED | OUTPATIENT
Start: 2022-03-15 | End: 2022-04-12 | Stop reason: SDUPTHER

## 2022-03-15 RX ORDER — PANTOPRAZOLE SODIUM 40 MG/1
40 TABLET, DELAYED RELEASE ORAL DAILY
Qty: 30 TABLET | Refills: 1 | Status: SHIPPED | OUTPATIENT
Start: 2022-03-15 | End: 2022-04-12 | Stop reason: SDUPTHER

## 2022-03-15 RX ORDER — TRAZODONE HYDROCHLORIDE 50 MG/1
50-100 TABLET ORAL NIGHTLY
Qty: 60 TABLET | Refills: 1 | Status: SHIPPED | OUTPATIENT
Start: 2022-03-15 | End: 2022-04-12 | Stop reason: SDUPTHER

## 2022-03-15 RX ADMIN — TRIAMCINOLONE ACETONIDE 40 MG: 40 INJECTION, SUSPENSION INTRA-ARTICULAR; INTRAMUSCULAR at 15:16

## 2022-03-15 NOTE — PROGRESS NOTES
Robi Hatchet  1945  [id number]    HISTORY OF PRESENT ILLNESS:  Ms. Rosa Goins is a 78 y.o. female returns for a follow up visit for multiple medical problems. Her current presenting problems are   1. Chronic pain syndrome    2. Degeneration of lumbar or lumbosacral intervertebral disc    3. Fibromyalgia    4. Osteoarthritis of spine with radiculopathy, lumbar region    5. Primary insomnia    6. Muscle cramping    7. Lumbar spondylosis    8. DDD (degenerative disc disease), lumbar    9. Trochanteric bursitis of right hip    10. Moderate episode of recurrent major depressive disorder ([facility] [address]    11. Medication side effects present, subsequent encounter    12. Gastroesophageal reflux disease without esophagitis    . As per information/history obtained from the PADT(patient assessment and documentation tool) - She complains of pain in the lower back with radiation to the lower back She rates the pain 6/10 and describes it as aching. Pain is made worse by: standing. Current treatment regimen has helped relieve about 40% of the pain. She denies side effects from the current pain regimen. Patient reports that since the last follow up visit the physical functioning is unchanged, family/social relationships are unchanged, mood is unchanged and sleep patterns are unchanged, and that the overall functioning is unchanged. Patient denies neurological bowel or bladder. Patient denies misusing/abusing her narcotic pain medications or using any illegal drugs. There are No indicators for possible drug abuse, addiction or diversion problems. Upon obtaining the medical history from Ms. Rosa Goins regarding today's office visit for her presenting problems, patient complains she has been doing worse. She states she has a spot on the back that's really hurting. She says she is walking/Standing painful. She mentions she is using Percocet 4 per day. She denies any side effects. She reports she is using all the other adjuvants.  She needed for Pain for up to 28 days. Max 4 a day 112 tablet 0    pantoprazole (PROTONIX) 40 MG tablet Take 1 tablet by mouth daily 30 tablet 1    tiZANidine (ZANAFLEX) 4 MG tablet Take 1 tablet by mouth nightly 30 tablet 1    DULoxetine (CYMBALTA) 60 MG extended release capsule Take 1 capsule by mouth nightly 30 capsule 1    traZODone (DESYREL) 50 MG tablet Take 1-2 tablets by mouth nightly 60 tablet 1    gabapentin (NEURONTIN) 600 MG tablet Take 1 tablet by mouth 2 times daily for 30 days. 60 tablet 1    meloxicam (MOBIC) 15 MG tablet Take 1 tablet by mouth daily 30 tablet 1    magnesium oxide (MGO) 400 (241.3 Mg) MG TABS tablet Take 1 tablet by mouth 2 times daily as needed (for cramping) 60 tablet 0     No facility-administered medications prior to visit. REVIEW OF SYSTEMS:    Respiratory: Negative for apnea, chest tightness and shortness of breath or change in baseline breathing. PHYSICAL EXAM:   Nursing note and vitals reviewed. /75   Pulse 80   Temp 97 °F (36.1 °C)   Resp 18   Ht 5' 4\" (1.626 m)   Wt 137 lb (62.1 kg)   SpO2 91%   Breastfeeding No   BMI 23.52 kg/m²   Constitutional: She appears well-developed and well-nourished. No acute distress. Cardiovascular: Normal rate, regular rhythm, normal heart sounds, and does not have murmur. Pulmonary/Chest: Effort normal. No respiratory distress. She does not have wheezes in the lung fields. She has no rales. Neurological/Psychiatric:She is alert and oriented to person, place, and time. Coordination is  normal.  Her mood isAppropriate and affect is Neutral/Euthymic(normal) . IMPRESSION:   1. Chronic pain syndrome    2. Degeneration of lumbar or lumbosacral intervertebral disc    3. Fibromyalgia    4. Osteoarthritis of spine with radiculopathy, lumbar region    5. Muscle cramping    6. Lumbar spondylosis    7. DDD (degenerative disc disease), lumbar    8.  Trochanteric bursitis of right hip        PLAN:  Informed verbal consent was obtained  -ROM/Stretching exercises as advised   -She was advised to increase fluids ( 5-7  glasses of fluid daily), limit caffeine, avoid cheese products, increase dietary fiber, increase activity and exercise as tolerated and relax regularly and enjoy meals   -Walking as tolerated   -Continue with Percocet 4 per day   -Informed verbal consent was obtained from the patient. Risks and benefits of the procedure were explained. Complications of the procedure and side effects of kenalog/ lidocaine were discussed with patient. Using 0.25% marcaine and 1cc of kenalog, the the tender trigger point areas in the  bilateral paraspinal lumbar muscles -erector spinae and longgissmus muscles were injected under aseptic condition. Mobilization attempted by stretching. Patient tolerated procedure well. Adv to apply ice today.   -Urine drug screen with GC/MS for opiates and drugs of abuse was ordered and will follow up on results. Current Outpatient Medications   Medication Sig Dispense Refill    oxyCODONE-acetaminophen (PERCOCET) 7.5-325 MG per tablet Take 1 tablet by mouth every 6 hours as needed for Pain for up to 28 days. Max 4 a day 112 tablet 0    pantoprazole (PROTONIX) 40 MG tablet Take 1 tablet by mouth daily 30 tablet 1    tiZANidine (ZANAFLEX) 4 MG tablet Take 1 tablet by mouth nightly 30 tablet 1    DULoxetine (CYMBALTA) 60 MG extended release capsule Take 1 capsule by mouth nightly 30 capsule 1    traZODone (DESYREL) 50 MG tablet Take 1-2 tablets by mouth nightly 60 tablet 1    gabapentin (NEURONTIN) 600 MG tablet Take 1 tablet by mouth 2 times daily for 30 days.  60 tablet 1    meloxicam (MOBIC) 15 MG tablet Take 1 tablet by mouth daily 30 tablet 1    magnesium oxide (MGO) 400 (241.3 Mg) MG TABS tablet Take 1 tablet by mouth 2 times daily as needed (for cramping) 60 tablet 0    alendronate (FOSAMAX) 70 MG tablet TAKE 1 TABLET BY MOUTH ONE TIME PER WEEK 12 tablet 1    Fluticasone-Umeclidin-Vilant (TRELEGY ELLIPTA) 200-62.5-25 MCG/INH AEPB Inhale 1 Inhaler into the lungs daily 1 each 0    Budeson-Glycopyrrol-Formoterol (BREZTRI AEROSPHERE) 160-9-4.8 MCG/ACT AERO Inhale 2 puffs into the lungs Twice a Week 1 each 0    atorvastatin (LIPITOR) 80 MG tablet TAKE 1 TABLET BY MOUTH EVERY DAY FOR CHOLESTEROL 90 tablet 1    albuterol sulfate  (90 Base) MCG/ACT inhaler Inhale 2 puffs into the lungs every 6 hours as needed for Shortness of Breath 1 each 11    Calcium Citrate-Vitamin D 500-500 MG-UNIT CHEW Take 1 tablet by mouth 2 times daily      Fluticasone furoate-vilanterol (BREO ELLIPTA) 200-25 MCG/INH AEPB inhaler Inhale 1 puff into the lungs daily 1 each 11    Blood Glucose Monitoring Suppl (ONE TOUCH ULTRA MINI) w/Device KIT 1 kit by Does not apply route daily 1 kit 0    ONE TOUCH LANCETS MISC 1 each by Does not apply route daily 100 each 3    blood glucose test strips (ASCENSIA AUTODISC VI;ONE TOUCH ULTRA TEST VI) strip 1 each by In Vitro route daily 100 each 3    ipratropium-albuterol (DUONEB) 0.5-2.5 (3) MG/3ML SOLN nebulizer solution Take 1 aerosol every 4 hours for 2 days and then as needed for shortness of breath coughing and wheezing 360 mL 3    Cholecalciferol (VITAMIN D) 2000 units TABS tablet Take 1 tablet by mouth daily 30 tablet     cetirizine (ZYRTEC) 10 MG tablet Take 10 mg by mouth daily as needed for Allergies      Nebulizers (COMPRESSOR/NEBULIZER) MISC 1 Units by Does not apply route 4 times daily 1 each 3     No current facility-administered medications for this visit. I will continue her current medication regimen  which is part of the above treatment schedule.  It has been helping with Ms. Martha Van chronic  medical problems which for this visit include:   Diagnoses of Chronic pain syndrome, Degeneration of lumbar or lumbosacral intervertebral disc, Fibromyalgia, Osteoarthritis of spine with radiculopathy, lumbar region, Primary insomnia, Muscle cramping, Lumbar spondylosis, DDD (degenerative disc disease), lumbar, Trochanteric bursitis of right hip, Moderate episode of recurrent major depressive disorder ([address] Medication side effects present, subsequent encounter, and Gastroesophageal reflux disease without esophagitis were pertinent to this visit. Risks and benefits of the medications and other alternative treatments  including no treatment were discussed with the patient. The common side effects of these medications were also explained to the patient. Informed verbal consent was obtained. Goals of current treatment regimen include improvement in pain, restoration of functioning- with focus on improvement in physical performance, general activity, work or disability,emotional distress, health care utilization and  decreased opioid medication consumption- titrating to the lowest effective dose. Will continue to monitor progress towards achieving/maintaining therapeutic goals with special emphasis on  1. Improvement in perceived interfernce  of pain with ADL's. Ability to do home exercises independently. Ability to do household chores indoor and/or outdoor work and social and leisure activities. Improve psychosocial and physical functioning. - she is showing progression towards this treatment goal with the current regimen. She was advised against drinking alcohol with the narcotic pain medicines, advised against driving or handling machinery while adjusting the dose of medicines or if having cognitive  issues related to the current medications. Risk of overdose and death, if medicines not taken as prescribed, were also discussed. If the patient develops new symptoms or if the symptoms worsen, the patient should call the office. While transcribing every attempt was made to maintain the accuracy of the note in terms of it's contents,there may have been some errors made inadvertently.   Thank you for allowing me to participate in the care of this patient.     Severiano Jj MD.    Cc: HECTOR Acevedo - CNP

## 2022-03-19 DIAGNOSIS — G89.4 CHRONIC PAIN SYNDROME: ICD-10-CM

## 2022-03-24 ENCOUNTER — HOSPITAL ENCOUNTER (OUTPATIENT)
Dept: CT IMAGING | Age: 77
Discharge: HOME | End: 2022-03-24
Payer: MEDICARE

## 2022-03-24 DIAGNOSIS — Z87.891 PERSONAL HISTORY OF TOBACCO USE: ICD-10-CM

## 2022-03-24 PROCEDURE — 71271 CT THORAX LUNG CANCER SCR C-: CPT

## 2022-03-28 ENCOUNTER — TELEPHONE (OUTPATIENT)
Dept: CASE MANAGEMENT | Age: 77
End: 2022-03-28

## 2022-03-28 NOTE — TELEPHONE ENCOUNTER
CT Lung Cancer Screening completed on 3/24/2022, ordering provider, Dr Mike Quintero & patients, PCP, MEG Fajardo routed radiology results with recommendations. Smoking history reviewed.

## 2022-03-29 DIAGNOSIS — R91.1 PULMONARY NODULE SEEN ON IMAGING STUDY: Primary | ICD-10-CM

## 2022-04-01 DIAGNOSIS — G89.4 CHRONIC PAIN SYNDROME: ICD-10-CM

## 2022-04-04 ENCOUNTER — TELEPHONE (OUTPATIENT)
Dept: PULMONOLOGY | Age: 77
End: 2022-04-04

## 2022-04-04 DIAGNOSIS — J44.9 COPD, SEVERE (HCC): ICD-10-CM

## 2022-04-04 RX ORDER — FLUTICASONE FUROATE, UMECLIDINIUM BROMIDE AND VILANTEROL TRIFENATATE 200; 62.5; 25 UG/1; UG/1; UG/1
1 POWDER RESPIRATORY (INHALATION) DAILY
Qty: 1 EACH | Refills: 0 | Status: SHIPPED | OUTPATIENT
Start: 2022-04-04 | End: 2022-04-05

## 2022-04-04 NOTE — TELEPHONE ENCOUNTER
Patient would like to have Trelegy called in   Pharm : [facility]

## 2022-04-05 RX ORDER — FLUTICASONE FUROATE, UMECLIDINIUM BROMIDE AND VILANTEROL TRIFENATATE 200; 62.5; 25 UG/1; UG/1; UG/1
1 POWDER RESPIRATORY (INHALATION) DAILY
Qty: 1 EACH | Refills: 5 | Status: SHIPPED | OUTPATIENT
Start: 2022-04-05 | End: 2022-10-10 | Stop reason: SDUPTHER

## 2022-04-06 DIAGNOSIS — G89.4 CHRONIC PAIN SYNDROME: ICD-10-CM

## 2022-04-12 ENCOUNTER — OFFICE VISIT (OUTPATIENT)
Dept: PAIN MANAGEMENT | Age: 77
End: 2022-04-12
Payer: MEDICARE

## 2022-04-12 VITALS
BODY MASS INDEX: 23.65 KG/M2 | WEIGHT: 137.8 LBS | DIASTOLIC BLOOD PRESSURE: 75 MMHG | SYSTOLIC BLOOD PRESSURE: 134 MMHG | HEART RATE: 67 BPM

## 2022-04-12 DIAGNOSIS — M70.61 TROCHANTERIC BURSITIS OF RIGHT HIP: ICD-10-CM

## 2022-04-12 DIAGNOSIS — M47.816 LUMBAR SPONDYLOSIS: ICD-10-CM

## 2022-04-12 DIAGNOSIS — M79.7 FIBROMYALGIA: ICD-10-CM

## 2022-04-12 DIAGNOSIS — M47.26 OSTEOARTHRITIS OF SPINE WITH RADICULOPATHY, LUMBAR REGION: ICD-10-CM

## 2022-04-12 DIAGNOSIS — M51.37 DEGENERATION OF LUMBAR OR LUMBOSACRAL INTERVERTEBRAL DISC: ICD-10-CM

## 2022-04-12 DIAGNOSIS — G89.4 CHRONIC PAIN SYNDROME: ICD-10-CM

## 2022-04-12 DIAGNOSIS — M51.36 DDD (DEGENERATIVE DISC DISEASE), LUMBAR: ICD-10-CM

## 2022-04-12 PROCEDURE — 4040F PNEUMOC VAC/ADMIN/RCVD: CPT | Performed by: INTERNAL MEDICINE

## 2022-04-12 PROCEDURE — 4004F PT TOBACCO SCREEN RCVD TLK: CPT | Performed by: INTERNAL MEDICINE

## 2022-04-12 PROCEDURE — 99213 OFFICE O/P EST LOW 20 MIN: CPT | Performed by: INTERNAL MEDICINE

## 2022-04-12 PROCEDURE — G8420 CALC BMI NORM PARAMETERS: HCPCS | Performed by: INTERNAL MEDICINE

## 2022-04-12 PROCEDURE — 1123F ACP DISCUSS/DSCN MKR DOCD: CPT | Performed by: INTERNAL MEDICINE

## 2022-04-12 PROCEDURE — 3017F COLORECTAL CA SCREEN DOC REV: CPT | Performed by: INTERNAL MEDICINE

## 2022-04-12 PROCEDURE — G8427 DOCREV CUR MEDS BY ELIG CLIN: HCPCS | Performed by: INTERNAL MEDICINE

## 2022-04-12 PROCEDURE — 1090F PRES/ABSN URINE INCON ASSESS: CPT | Performed by: INTERNAL MEDICINE

## 2022-04-12 PROCEDURE — G8399 PT W/DXA RESULTS DOCUMENT: HCPCS | Performed by: INTERNAL MEDICINE

## 2022-04-12 RX ORDER — DULOXETIN HYDROCHLORIDE 60 MG/1
CAPSULE, DELAYED RELEASE ORAL
Qty: 30 CAPSULE | Refills: 1 | OUTPATIENT
Start: 2022-04-12

## 2022-04-12 RX ORDER — GABAPENTIN 600 MG/1
600 TABLET ORAL 2 TIMES DAILY
Qty: 60 TABLET | Refills: 1 | Status: ON HOLD | OUTPATIENT
Start: 2022-04-12 | End: 2022-07-13

## 2022-04-12 RX ORDER — TIZANIDINE 4 MG/1
4 TABLET ORAL NIGHTLY
Qty: 30 TABLET | Refills: 1 | OUTPATIENT
Start: 2022-04-12

## 2022-04-12 RX ORDER — PANTOPRAZOLE SODIUM 40 MG/1
40 TABLET, DELAYED RELEASE ORAL DAILY
Qty: 30 TABLET | Refills: 1 | Status: SHIPPED | OUTPATIENT
Start: 2022-04-12 | End: 2022-05-10 | Stop reason: SDUPTHER

## 2022-04-12 RX ORDER — TRAZODONE HYDROCHLORIDE 50 MG/1
50-100 TABLET ORAL NIGHTLY
Qty: 60 TABLET | Refills: 1 | Status: SHIPPED | OUTPATIENT
Start: 2022-04-12 | End: 2022-09-20 | Stop reason: SDUPTHER

## 2022-04-12 RX ORDER — PANTOPRAZOLE SODIUM 40 MG/1
TABLET, DELAYED RELEASE ORAL
Qty: 30 TABLET | Refills: 1 | OUTPATIENT
Start: 2022-04-12

## 2022-04-12 RX ORDER — TIZANIDINE 4 MG/1
4 TABLET ORAL NIGHTLY
Qty: 30 TABLET | Refills: 1 | Status: SHIPPED | OUTPATIENT
Start: 2022-04-12 | End: 2022-05-10 | Stop reason: SDUPTHER

## 2022-04-12 RX ORDER — MELOXICAM 15 MG/1
15 TABLET ORAL DAILY
Qty: 30 TABLET | Refills: 1 | Status: SHIPPED | OUTPATIENT
Start: 2022-04-12 | End: 2022-05-10 | Stop reason: SDUPTHER

## 2022-04-12 RX ORDER — OXYCODONE AND ACETAMINOPHEN 7.5; 325 MG/1; MG/1
1 TABLET ORAL EVERY 6 HOURS PRN
Qty: 112 TABLET | Refills: 0 | Status: SHIPPED | OUTPATIENT
Start: 2022-04-12 | End: 2022-05-10 | Stop reason: SDUPTHER

## 2022-04-12 NOTE — PROGRESS NOTES
Diana Olivier  1945  [id number]      HISTORY OF PRESENT ILLNESS:  Ms. Rosalba Ravi is a 78 y.o. female returns for a follow up visit for pain management  She has a diagnosis of   1. Chronic pain syndrome    2. Degeneration of lumbar or lumbosacral intervertebral disc    3. Fibromyalgia    4. Lumbar spondylosis    5. Trochanteric bursitis of right hip    6. Muscle cramping    7. DDD (degenerative disc disease), lumbar    8. Osteoarthritis of spine with radiculopathy, lumbar region    . She complains of pain in the lower back  She rates the pain 5/10 and describes it as aching. Current treatment regimen has helped relieve about 50% of the pain. She denies any side effects from the current pain regimen. Patient reports that since the last follow up visit the physical functioning is unchanged, family/social relationships are unchanged, mood is unchanged sleep patterns are unchanged, and that the overall functioning is unchanged. Patient denies misusing/abusing her narcotic pain medications or using any illegal drugs. There are No indicators for possible drug abuse, addiction or diversion problems, patient states she has been doing fair, her pain has been baseline. Ms. Rosalba Ravi states the injection did help some, she is feeling better. Patient is complaining of her back hurting worse than her legs. She mentions she is using Neurontin along with Mobic and Percocet 4 per day. Patient denies any constipation symptoms. She says she has been walking some daily. ALLERGIES: Patients list of allergies were reviewed     MEDICATIONS: Ms. Rosalba Ravi list of medications were reviewed. Her current medications are   Outpatient Medications Prior to Visit   Medication Sig Dispense Refill    Fluticasone-Umeclidin-Vilant (TRELEGY ELLIPTA) 200-62.5-25 MCG/INH AEPB Inhale 1 Inhaler into the lungs daily 1 each 5    oxyCODONE-acetaminophen (PERCOCET) 7.5-325 MG per tablet Take 1 tablet by mouth every 6 hours as needed for Pain for up to 28 days. Max 4 a day 112 tablet 0    pantoprazole (PROTONIX) 40 MG tablet Take 1 tablet by mouth daily 30 tablet 1    tiZANidine (ZANAFLEX) 4 MG tablet Take 1 tablet by mouth nightly 30 tablet 1    DULoxetine (CYMBALTA) 60 MG extended release capsule Take 1 capsule by mouth nightly 30 capsule 1    traZODone (DESYREL) 50 MG tablet Take 1-2 tablets by mouth nightly 60 tablet 1    gabapentin (NEURONTIN) 600 MG tablet Take 1 tablet by mouth 2 times daily for 30 days.  60 tablet 1    meloxicam (MOBIC) 15 MG tablet Take 1 tablet by mouth daily 30 tablet 1    magnesium oxide (MGO) 400 (241.3 Mg) MG TABS tablet Take 1 tablet by mouth 2 times daily as needed (for cramping) 60 tablet 0    alendronate (FOSAMAX) 70 MG tablet TAKE 1 TABLET BY MOUTH ONE TIME PER WEEK 12 tablet 1    Budeson-Glycopyrrol-Formoterol (BREZTRI AEROSPHERE) 160-9-4.8 MCG/ACT AERO Inhale 2 puffs into the lungs Twice a Week 1 each 0    atorvastatin (LIPITOR) 80 MG tablet TAKE 1 TABLET BY MOUTH EVERY DAY FOR CHOLESTEROL 90 tablet 1    albuterol sulfate  (90 Base) MCG/ACT inhaler Inhale 2 puffs into the lungs every 6 hours as needed for Shortness of Breath 1 each 11    Calcium Citrate-Vitamin D 500-500 MG-UNIT CHEW Take 1 tablet by mouth 2 times daily      Fluticasone furoate-vilanterol (BREO ELLIPTA) 200-25 MCG/INH AEPB inhaler Inhale 1 puff into the lungs daily 1 each 11    Blood Glucose Monitoring Suppl (ONE TOUCH ULTRA MINI) w/Device KIT 1 kit by Does not apply route daily 1 kit 0    ONE TOUCH LANCETS MISC 1 each by Does not apply route daily 100 each 3    blood glucose test strips (ASCENSIA AUTODISC VI;ONE TOUCH ULTRA TEST VI) strip 1 each by In Vitro route daily 100 each 3    ipratropium-albuterol (DUONEB) 0.5-2.5 (3) MG/3ML SOLN nebulizer solution Take 1 aerosol every 4 hours for 2 days and then as needed for shortness of breath coughing and wheezing 360 mL 3    Cholecalciferol (VITAMIN D) 2000 units TABS tablet Take 1 tablet by mouth daily 30 tablet     cetirizine (ZYRTEC) 10 MG tablet Take 10 mg by mouth daily as needed for Allergies      Nebulizers (COMPRESSOR/NEBULIZER) MISC 1 Units by Does not apply route 4 times daily 1 each 3     No facility-administered medications prior to visit. REVIEW OF SYSTEMS:    Respiratory: Negative for apnea, chest tightness and shortness of breath or change in baseline breathing. PHYSICAL EXAM:   Nursing note and vitals reviewed. /75   Pulse 67   Wt 137 lb 12.8 oz (62.5 kg)   BMI 23.65 kg/m²   Constitutional: She appears well-developed and well-nourished. No acute distress. Cardiovascular: Normal rate, regular rhythm, normal heart sounds, and does not have murmur. Pulmonary/Chest: Effort normal. No respiratory distress. She does not have wheezes in the lung fields. She has no rales. Neurological/Psychiatric:She is alert and oriented to person, place, and time. Coordination is  normal.  Her mood isAppropriate and affect is Neutral/Euthymic(normal) . Her    IMPRESSION:   1. Chronic pain syndrome    2. Degeneration of lumbar or lumbosacral intervertebral disc    3. Fibromyalgia    4. Lumbar spondylosis    5. Trochanteric bursitis of right hip    6. DDD (degenerative disc disease), lumbar    7. Osteoarthritis of spine with radiculopathy, lumbar region        PLAN:  Informed verbal consent was obtained  -ROM/Stretching exercises as advised   -She was advised to increase fluids ( 5-7  glasses of fluid daily), limit caffeine, avoid cheese products, increase dietary fiber, increase activity and exercise as tolerated and relax regularly and enjoy meals   -Walking 20 minutes daily as tolerated   -Advised patient to quit smoking for  health related concerns and to improve the treatment outcomes. Education was given on quitting smoking and the use of different modalities including medications, hypnotherapy, counselling  and biofeedback. These were discussed with patient. -Continue with Percocet 4 per day  -Patient's urine drug screen results with GC/MS confirmation were obtained and reviewed and were negative for any illicit drugs. Prescribed medications were within acceptable range. Current Outpatient Medications   Medication Sig Dispense Refill    Fluticasone-Umeclidin-Vilant (TRELEGY ELLIPTA) 200-62.5-25 MCG/INH AEPB Inhale 1 Inhaler into the lungs daily 1 each 5    oxyCODONE-acetaminophen (PERCOCET) 7.5-325 MG per tablet Take 1 tablet by mouth every 6 hours as needed for Pain for up to 28 days. Max 4 a day 112 tablet 0    pantoprazole (PROTONIX) 40 MG tablet Take 1 tablet by mouth daily 30 tablet 1    tiZANidine (ZANAFLEX) 4 MG tablet Take 1 tablet by mouth nightly 30 tablet 1    DULoxetine (CYMBALTA) 60 MG extended release capsule Take 1 capsule by mouth nightly 30 capsule 1    traZODone (DESYREL) 50 MG tablet Take 1-2 tablets by mouth nightly 60 tablet 1    gabapentin (NEURONTIN) 600 MG tablet Take 1 tablet by mouth 2 times daily for 30 days.  60 tablet 1    meloxicam (MOBIC) 15 MG tablet Take 1 tablet by mouth daily 30 tablet 1    magnesium oxide (MGO) 400 (241.3 Mg) MG TABS tablet Take 1 tablet by mouth 2 times daily as needed (for cramping) 60 tablet 0    alendronate (FOSAMAX) 70 MG tablet TAKE 1 TABLET BY MOUTH ONE TIME PER WEEK 12 tablet 1    Budeson-Glycopyrrol-Formoterol (BREZTRI AEROSPHERE) 160-9-4.8 MCG/ACT AERO Inhale 2 puffs into the lungs Twice a Week 1 each 0    atorvastatin (LIPITOR) 80 MG tablet TAKE 1 TABLET BY MOUTH EVERY DAY FOR CHOLESTEROL 90 tablet 1    albuterol sulfate  (90 Base) MCG/ACT inhaler Inhale 2 puffs into the lungs every 6 hours as needed for Shortness of Breath 1 each 11    Calcium Citrate-Vitamin D 500-500 MG-UNIT CHEW Take 1 tablet by mouth 2 times daily      Fluticasone furoate-vilanterol (BREO ELLIPTA) 200-25 MCG/INH AEPB inhaler Inhale 1 puff into the lungs daily 1 each 11    Blood Glucose Monitoring Suppl (ONE TOUCH ULTRA MINI) w/Device KIT 1 kit by Does not apply route daily 1 kit 0    ONE TOUCH LANCETS MISC 1 each by Does not apply route daily 100 each 3    blood glucose test strips (ASCENSIA AUTODISC VI;ONE TOUCH ULTRA TEST VI) strip 1 each by In Vitro route daily 100 each 3    ipratropium-albuterol (DUONEB) 0.5-2.5 (3) MG/3ML SOLN nebulizer solution Take 1 aerosol every 4 hours for 2 days and then as needed for shortness of breath coughing and wheezing 360 mL 3    Cholecalciferol (VITAMIN D) 2000 units TABS tablet Take 1 tablet by mouth daily 30 tablet     cetirizine (ZYRTEC) 10 MG tablet Take 10 mg by mouth daily as needed for Allergies      Nebulizers (COMPRESSOR/NEBULIZER) MISC 1 Units by Does not apply route 4 times daily 1 each 3     No current facility-administered medications for this visit. I will continue her current medication regimen  which is part of the above treatment schedule. It has been helping with Ms. Nyla Davison chronic  medical problems which for this visit include:   Diagnoses of Chronic pain syndrome, Degeneration of lumbar or lumbosacral intervertebral disc, Fibromyalgia, Lumbar spondylosis, Trochanteric bursitis of right hip, Muscle cramping, DDD (degenerative disc disease), lumbar, and Osteoarthritis of spine with radiculopathy, lumbar region were pertinent to this visit. Risks and benefits of the medications and other alternative treatments  including no treatment were discussed with the patient. The common side effects of these medications were also explained to the patient. Informed verbal consent was obtained. Goals of current treatment regimen include improvement in pain, restoration of functioning- with focus on improvement in physical performance, general activity, work or disability,emotional distress, health care utilization and  decreased medication consumption. Will continue to monitor progress towards achieving/maintaining therapeutic goals with special emphasis on  1. Improvement in perceived interfernce  of pain with ADL's. Ability to do home exercises independently. Ability to do household chores indoor and/or outdoor work and social and leisure activities. Improve psychosocial and physical functioning. - she is showing progression towards this treatment goal with the current regimen. She was advised against drinking alcohol with the narcotic pain medicines, advised against driving or handling machinery while adjusting the dose of medicines or if having cognitive  issues related to the current medications. Risk of overdose and death, if medicines not taken as prescribed, were also discussed. If the patient develops new symptoms or if the symptoms worsen, the patient should call the office. While transcribing every attempt was made to maintain the accuracy of the note in terms of it's contents,there may have been some errors made inadvertently. Thank you for allowing me to participate in the care of this patient.     Tiffany Álvarez MD.    Cc: HECTOR Payne - CNP

## 2022-04-14 ENCOUNTER — TELEPHONE (OUTPATIENT)
Dept: CASE MANAGEMENT | Age: 77
End: 2022-04-14

## 2022-04-14 NOTE — TELEPHONE ENCOUNTER
CT Lung Cancer Screening completed on 3/24/2022, ordering provider, Dr Ke Licea reviewed radiology results with recommendations & notified patient of results with recommendations. Result letter mailed. Smoking history reviewed.

## 2022-04-22 ENCOUNTER — OFFICE VISIT (OUTPATIENT)
Dept: FAMILY MEDICINE CLINIC | Age: 77
End: 2022-04-22
Payer: MEDICARE

## 2022-04-22 VITALS
HEIGHT: 64 IN | OXYGEN SATURATION: 93 % | RESPIRATION RATE: 18 BRPM | BODY MASS INDEX: 23.63 KG/M2 | SYSTOLIC BLOOD PRESSURE: 120 MMHG | HEART RATE: 65 BPM | WEIGHT: 138.4 LBS | DIASTOLIC BLOOD PRESSURE: 74 MMHG | TEMPERATURE: 98.3 F

## 2022-04-22 DIAGNOSIS — F33.1 MODERATE EPISODE OF RECURRENT MAJOR DEPRESSIVE DISORDER (HCC): ICD-10-CM

## 2022-04-22 DIAGNOSIS — G89.4 CHRONIC PAIN SYNDROME: ICD-10-CM

## 2022-04-22 DIAGNOSIS — J44.9 COPD, SEVERE (HCC): Primary | ICD-10-CM

## 2022-04-22 DIAGNOSIS — R07.9 CHEST PAIN, UNSPECIFIED TYPE: ICD-10-CM

## 2022-04-22 DIAGNOSIS — R53.82 CHRONIC FATIGUE: ICD-10-CM

## 2022-04-22 DIAGNOSIS — R06.09 DOE (DYSPNEA ON EXERTION): ICD-10-CM

## 2022-04-22 PROCEDURE — 93000 ELECTROCARDIOGRAM COMPLETE: CPT | Performed by: NURSE PRACTITIONER

## 2022-04-22 PROCEDURE — G8427 DOCREV CUR MEDS BY ELIG CLIN: HCPCS | Performed by: NURSE PRACTITIONER

## 2022-04-22 PROCEDURE — 4004F PT TOBACCO SCREEN RCVD TLK: CPT | Performed by: NURSE PRACTITIONER

## 2022-04-22 PROCEDURE — 4040F PNEUMOC VAC/ADMIN/RCVD: CPT | Performed by: NURSE PRACTITIONER

## 2022-04-22 PROCEDURE — 1090F PRES/ABSN URINE INCON ASSESS: CPT | Performed by: NURSE PRACTITIONER

## 2022-04-22 PROCEDURE — G8420 CALC BMI NORM PARAMETERS: HCPCS | Performed by: NURSE PRACTITIONER

## 2022-04-22 PROCEDURE — 3017F COLORECTAL CA SCREEN DOC REV: CPT | Performed by: NURSE PRACTITIONER

## 2022-04-22 PROCEDURE — 99214 OFFICE O/P EST MOD 30 MIN: CPT | Performed by: NURSE PRACTITIONER

## 2022-04-22 PROCEDURE — G8399 PT W/DXA RESULTS DOCUMENT: HCPCS | Performed by: NURSE PRACTITIONER

## 2022-04-22 PROCEDURE — 1123F ACP DISCUSS/DSCN MKR DOCD: CPT | Performed by: NURSE PRACTITIONER

## 2022-04-22 PROCEDURE — 3023F SPIROM DOC REV: CPT | Performed by: NURSE PRACTITIONER

## 2022-04-22 RX ORDER — DULOXETIN HYDROCHLORIDE 60 MG/1
60 CAPSULE, DELAYED RELEASE ORAL 2 TIMES DAILY
Qty: 60 CAPSULE | Refills: 1 | Status: SHIPPED | OUTPATIENT
Start: 2022-04-22 | End: 2022-05-19

## 2022-04-22 ASSESSMENT — ENCOUNTER SYMPTOMS
COUGH: 1
WHEEZING: 1
SHORTNESS OF BREATH: 1
ABDOMINAL PAIN: 0

## 2022-04-22 NOTE — PROGRESS NOTES
4/22/2022    This is a 78 y.o. female   Chief Complaint   Patient presents with    COPD     better since last visit but still having issues    Hyperlipidemia     no issues or concerns    Fatigue     has been more fatigued the last 4-5 months. Wallace Kraft HPI  COPD- She reports that she was having increased shortness of breath and cough. Was working with her pulmonologist Dr. Souleymane Maurice and Perez Higgins was switched to the trelegy. She reports that her breathing is a little better with the trelegy. Uses her albuterol nebulizer about 1 time per week. Uses her albuterol HFA 2-3 times per week. Continues to smoke at least a pack a day. She is not interested in quitting smoking. Reports that she will occasionally have chest pain. She is not able to stay when she has the pain. Can be with activity or with rest. Has increased shortness of breath with activity, but thinks that is r/t her COPD. She has seen cardiologist in the past, but never completed stress test. She is willing to complete now. Depression- continues to feel down a couple of days per week. Taking cymbalta 60 mg daily. Would like to adjust medication. Denies suicidal thoughts.      Lab Results   Component Value Date    LABA1C 5.8 02/01/2022    LABA1C 5.7 08/26/2021    LABA1C 5.9 02/22/2021     Lab Results   Component Value Date    LABMICR <1.20 04/20/2021    CREATININE 0.8 02/17/2022     Lab Results   Component Value Date    ALT 13 02/17/2022    AST 11 (L) 02/17/2022     Lab Results   Component Value Date    CHOL 171 02/17/2022    TRIG 146 02/17/2022    HDL 59 02/17/2022    LDLCALC 83 02/17/2022    LDLDIRECT 176 (H) 10/12/2012           Patient Active Problem List   Diagnosis    Osteopenia-last dexa 2009 repeat 2015 (-2.4 hip)--advised tx    Colon polyp, hyperplastic,-(done 3/11-repeat 3-5 yrs--dr Jono Amaro)    Family history of colon cancer    CTS (carpal tunnel syndrome)BILATERAL-s/p surgical repair--resolved     Postmenopausal status-last mammogram 2/15 wnl last pap 12/13--wnl    Nondependent alcohol abuse, in remission    Urticaria, chronic    Tobacco abuse-advised to quit--lung cancer screening neg 5/18--repeat low dose ct 1 yr    Chronic back pain-(djd) saw dr Jeanine Hull afford surgery--seeing dr Mario Dennis for pain meds    Fibromyalgia    Hypercholesteremia    Lumbar spondylosis    Vitamin D deficiency--severe--started 50,000 iu 2x/ wk 11/13    Insomnia--on elevil w help    Constipation--on daily miralax    Depression    Chronic pain syndrome    Muscle cramping    Gastroesophageal reflux disease    COPD, moderate (HCC)    Chronic hypoxemic respiratory failure (HCC)    Degeneration of lumbar or lumbosacral intervertebral disc    Trochanteric bursitis of right hip    New onset type 2 diabetes mellitus ([facility] [address]    Controlled type 2 diabetes mellitus without complication, without long-term current use of insulin ([facility] [address]    Age-related osteoporosis without current pathological fracture- started alendronate 9/2021          Current Outpatient Medications   Medication Sig Dispense Refill    oxyCODONE-acetaminophen (PERCOCET) 7.5-325 MG per tablet Take 1 tablet by mouth every 6 hours as needed for Pain for up to 28 days. Max 4 a day 112 tablet 0    pantoprazole (PROTONIX) 40 MG tablet Take 1 tablet by mouth daily 30 tablet 1    tiZANidine (ZANAFLEX) 4 MG tablet Take 1 tablet by mouth nightly 30 tablet 1    traZODone (DESYREL) 50 MG tablet Take 1-2 tablets by mouth nightly 60 tablet 1    gabapentin (NEURONTIN) 600 MG tablet Take 1 tablet by mouth 2 times daily for 30 days.  60 tablet 1    meloxicam (MOBIC) 15 MG tablet Take 1 tablet by mouth daily 30 tablet 1    Fluticasone-Umeclidin-Vilant (TRELEGY ELLIPTA) 200-62.5-25 MCG/INH AEPB Inhale 1 Inhaler into the lungs daily 1 each 5    DULoxetine (CYMBALTA) 60 MG extended release capsule Take 1 capsule by mouth nightly 30 capsule 1    magnesium oxide (MGO) 400 (241.3 Mg) MG TABS tablet Take 1 tablet by mouth 2 times daily as needed (for cramping) 60 tablet 0    alendronate (FOSAMAX) 70 MG tablet TAKE 1 TABLET BY MOUTH ONE TIME PER WEEK 12 tablet 1    atorvastatin (LIPITOR) 80 MG tablet TAKE 1 TABLET BY MOUTH EVERY DAY FOR CHOLESTEROL 90 tablet 1    albuterol sulfate  (90 Base) MCG/ACT inhaler Inhale 2 puffs into the lungs every 6 hours as needed for Shortness of Breath 1 each 11    Calcium Citrate-Vitamin D 500-500 MG-UNIT CHEW Take 1 tablet by mouth 2 times daily      Blood Glucose Monitoring Suppl (ONE TOUCH ULTRA MINI) w/Device KIT 1 kit by Does not apply route daily 1 kit 0    ONE TOUCH LANCETS MISC 1 each by Does not apply route daily 100 each 3    blood glucose test strips (ASCENSIA AUTODISC VI;ONE TOUCH ULTRA TEST VI) strip 1 each by In Vitro route daily 100 each 3    ipratropium-albuterol (DUONEB) 0.5-2.5 (3) MG/3ML SOLN nebulizer solution Take 1 aerosol every 4 hours for 2 days and then as needed for shortness of breath coughing and wheezing 360 mL 3    Cholecalciferol (VITAMIN D) 2000 units TABS tablet Take 1 tablet by mouth daily 30 tablet     cetirizine (ZYRTEC) 10 MG tablet Take 10 mg by mouth daily as needed for Allergies      Nebulizers (COMPRESSOR/NEBULIZER) MISC 1 Units by Does not apply route 4 times daily 1 each 3    Budeson-Glycopyrrol-Formoterol (BREZTRI AEROSPHERE) 160-9-4.8 MCG/ACT AERO Inhale 2 puffs into the lungs Twice a Week (Patient not taking: Reported on 4/22/2022) 1 each 0    Fluticasone furoate-vilanterol (BREO ELLIPTA) 200-25 MCG/INH AEPB inhaler Inhale 1 puff into the lungs daily (Patient not taking: Reported on 4/22/2022) 1 each 11     No current facility-administered medications for this visit. No Known Allergies    Review of Systems   Constitutional: Positive for activity change and fatigue. Negative for fever. Respiratory: Positive for cough, shortness of breath and wheezing. Cardiovascular: Positive for chest pain.  Negative for palpitations and leg swelling. Gastrointestinal: Negative for abdominal pain. Neurological: Negative for dizziness, syncope and headaches. Psychiatric/Behavioral: Positive for dysphoric mood and sleep disturbance. Negative for suicidal ideas. The patient is nervous/anxious. Vitals:    04/22/22 1026   BP: 120/74   Site: Right Upper Arm   Position: Sitting   Cuff Size: Medium Adult   Pulse: 65   Resp: 18   Temp: 98.3 °F (36.8 °C)   TempSrc: Oral   SpO2: 93%   Weight: 138 lb 6.4 oz (62.8 kg)   Height: 5' 4\" (1.626 m)       Body mass index is 23.76 kg/m². Wt Readings from Last 3 Encounters:   04/22/22 138 lb 6.4 oz (62.8 kg)   04/12/22 137 lb 12.8 oz (62.5 kg)   03/15/22 137 lb (62.1 kg)       BP Readings from Last 3 Encounters:   04/22/22 120/74   04/12/22 134/75   03/15/22 134/75       Physical Exam  Vitals and nursing note reviewed. Constitutional:       General: She is not in acute distress. Appearance: She is well-developed. HENT:      Head: Normocephalic and atraumatic. Cardiovascular:      Rate and Rhythm: Normal rate and regular rhythm. Heart sounds: Normal heart sounds. No murmur heard. No friction rub. No gallop. Pulmonary:      Effort: Pulmonary effort is normal. No respiratory distress. Breath sounds: Examination of the right-upper field reveals wheezing. Examination of the left-upper field reveals wheezing. Wheezing present. Musculoskeletal:      Cervical back: Neck supple. Right lower leg: No edema. Left lower leg: No edema. Skin:     General: Skin is warm and dry. Neurological:      Mental Status: She is alert and oriented to person, place, and time. Psychiatric:         Behavior: Behavior normal.         Thought Content: Thought content normal.         Judgment: Judgment normal.         Assessmentand Plan  Katy Petit was seen today for copd, hyperlipidemia and fatigue.     Diagnoses and all orders for this visit:    COPD, severe ([address]  Slightly improved from last visit on trelegy inhaler daily   Continue with albuterol inhaler PRN  Smoking cessation discussed and refused. Continue to f/u with pulmonologist Dr. Lucien Maldonado     Moderate episode of recurrent major depressive disorder ([facility]  -     DULoxetine (CYMBALTA) 60 MG extended release capsule; Take 1 capsule by mouth 2 times daily  Uncontrolled. Will increase cymbalta to BID. Side effects discussed. Chronic fatigue  -     CBC with Auto Differential; Future  -     TSH with Reflex; Future    Chest pain, unspecified type  -     Cardiac Stress Test - w/Pharm; Future  -     EKG 12 Lead- NSR  Due to recurrent symptoms will obtain lexiscan. She never completed when ordered in the past.     OSBORN (dyspnea on exertion)  -     Cardiac Stress Test - w/Pharm; Future  -     EKG 12 Lead    Chronic pain syndrome  -     DULoxetine (CYMBALTA) 60 MG extended release capsule; Take 1 capsule by mouth 2 times daily  Follows with Dr. Severa Byers for chronic pain. She was already taking the cymbalta for chronic pain. Return in about 6 weeks (around 6/3/2022), or if symptoms worsen or fail to improve, for mood.

## 2022-04-26 DIAGNOSIS — G89.4 CHRONIC PAIN SYNDROME: ICD-10-CM

## 2022-04-26 DIAGNOSIS — F33.1 MODERATE EPISODE OF RECURRENT MAJOR DEPRESSIVE DISORDER (HCC): ICD-10-CM

## 2022-05-05 DIAGNOSIS — G89.4 CHRONIC PAIN SYNDROME: ICD-10-CM

## 2022-05-09 RX ORDER — DULOXETIN HYDROCHLORIDE 60 MG/1
CAPSULE, DELAYED RELEASE ORAL
Qty: 30 CAPSULE | Refills: 1 | OUTPATIENT
Start: 2022-05-09

## 2022-05-09 RX ORDER — PANTOPRAZOLE SODIUM 40 MG/1
TABLET, DELAYED RELEASE ORAL
Qty: 30 TABLET | Refills: 1 | OUTPATIENT
Start: 2022-05-09

## 2022-05-10 ENCOUNTER — OFFICE VISIT (OUTPATIENT)
Dept: PAIN MANAGEMENT | Age: 77
End: 2022-05-10
Payer: MEDICARE

## 2022-05-10 VITALS
HEIGHT: 64 IN | WEIGHT: 136 LBS | DIASTOLIC BLOOD PRESSURE: 76 MMHG | OXYGEN SATURATION: 96 % | BODY MASS INDEX: 23.22 KG/M2 | SYSTOLIC BLOOD PRESSURE: 163 MMHG | HEART RATE: 86 BPM

## 2022-05-10 DIAGNOSIS — M79.7 FIBROMYALGIA: ICD-10-CM

## 2022-05-10 DIAGNOSIS — M70.61 TROCHANTERIC BURSITIS OF RIGHT HIP: ICD-10-CM

## 2022-05-10 DIAGNOSIS — M51.36 DDD (DEGENERATIVE DISC DISEASE), LUMBAR: ICD-10-CM

## 2022-05-10 DIAGNOSIS — M47.816 LUMBAR SPONDYLOSIS: ICD-10-CM

## 2022-05-10 DIAGNOSIS — M47.26 OSTEOARTHRITIS OF SPINE WITH RADICULOPATHY, LUMBAR REGION: ICD-10-CM

## 2022-05-10 DIAGNOSIS — G89.4 CHRONIC PAIN SYNDROME: ICD-10-CM

## 2022-05-10 DIAGNOSIS — M51.37 DEGENERATION OF LUMBAR OR LUMBOSACRAL INTERVERTEBRAL DISC: ICD-10-CM

## 2022-05-10 PROCEDURE — G8399 PT W/DXA RESULTS DOCUMENT: HCPCS | Performed by: INTERNAL MEDICINE

## 2022-05-10 PROCEDURE — G8427 DOCREV CUR MEDS BY ELIG CLIN: HCPCS | Performed by: INTERNAL MEDICINE

## 2022-05-10 PROCEDURE — 4040F PNEUMOC VAC/ADMIN/RCVD: CPT | Performed by: INTERNAL MEDICINE

## 2022-05-10 PROCEDURE — 1090F PRES/ABSN URINE INCON ASSESS: CPT | Performed by: INTERNAL MEDICINE

## 2022-05-10 PROCEDURE — 1123F ACP DISCUSS/DSCN MKR DOCD: CPT | Performed by: INTERNAL MEDICINE

## 2022-05-10 PROCEDURE — G8420 CALC BMI NORM PARAMETERS: HCPCS | Performed by: INTERNAL MEDICINE

## 2022-05-10 PROCEDURE — 99213 OFFICE O/P EST LOW 20 MIN: CPT | Performed by: INTERNAL MEDICINE

## 2022-05-10 PROCEDURE — 4004F PT TOBACCO SCREEN RCVD TLK: CPT | Performed by: INTERNAL MEDICINE

## 2022-05-10 PROCEDURE — 3017F COLORECTAL CA SCREEN DOC REV: CPT | Performed by: INTERNAL MEDICINE

## 2022-05-10 RX ORDER — MELOXICAM 15 MG/1
15 TABLET ORAL DAILY
Qty: 30 TABLET | Refills: 1 | Status: SHIPPED | OUTPATIENT
Start: 2022-05-10 | End: 2022-08-01

## 2022-05-10 RX ORDER — OXYCODONE AND ACETAMINOPHEN 7.5; 325 MG/1; MG/1
1 TABLET ORAL EVERY 6 HOURS PRN
Qty: 112 TABLET | Refills: 0 | Status: SHIPPED | OUTPATIENT
Start: 2022-05-10 | End: 2022-06-07 | Stop reason: SDUPTHER

## 2022-05-10 RX ORDER — PANTOPRAZOLE SODIUM 40 MG/1
40 TABLET, DELAYED RELEASE ORAL DAILY
Qty: 30 TABLET | Refills: 1 | Status: SHIPPED | OUTPATIENT
Start: 2022-05-10 | End: 2022-06-07 | Stop reason: SDUPTHER

## 2022-05-10 RX ORDER — TIZANIDINE 4 MG/1
4 TABLET ORAL NIGHTLY
Qty: 30 TABLET | Refills: 1 | Status: SHIPPED | OUTPATIENT
Start: 2022-05-10 | End: 2022-09-20

## 2022-05-10 NOTE — PROGRESS NOTES
Filomena Jorge  1945  [id number]      HISTORY OF PRESENT ILLNESS:  Ms. Santhosh Nowak is a 78 y.o. female returns for a follow up visit for pain management  She has a diagnosis of   1. Chronic pain syndrome    2. Fibromyalgia    3. DDD (degenerative disc disease), lumbar    4. Primary insomnia    5. Moderate episode of recurrent major depressive disorder (HCC)    6. Lumbar spondylosis    7. Osteoarthritis of spine with radiculopathy, lumbar region    8. Gastroesophageal reflux disease without esophagitis    9. Degeneration of lumbar or lumbosacral intervertebral disc    10. Trochanteric bursitis of right hip    11. Muscle cramping    12. Medication side effects present, subsequent encounter    . She complains of pain in the lower back She rates the pain 5/10 and describes it as aching, burning. Current treatment regimen has helped relieve about 50% of the pain. She denies any side effects from the current pain regimen. Patient reports that since the last follow up visit the physical functioning is unchanged, family/social relationships are unchanged, mood is unchanged sleep patterns are unchanged, and that the overall functioning is unchanged. Patient denies misusing/abusing her narcotic pain medications or using any illegal drugs. There are No indicators for possible drug abuse, addiction or diversion problems, patient states she has been doing fair, her pain has been baseline, somewhat tolerable with the medications. Patient states she is using Cymbalta but her PCP increased it to 60 mg BID. Patient states she is managing her ADL's and house chores. She says her breathing is baseline. ALLERGIES: Patients list of allergies were reviewed     MEDICATIONS: Ms. Santhosh Nowak list of medications were reviewed. Her current medications are   Outpatient Medications Prior to Visit   Medication Sig Dispense Refill    DULoxetine (CYMBALTA) 60 MG extended release capsule Take 1 capsule by mouth 2 times daily 60 capsule 1  traZODone (DESYREL) 50 MG tablet Take 1-2 tablets by mouth nightly 60 tablet 1    gabapentin (NEURONTIN) 600 MG tablet Take 1 tablet by mouth 2 times daily for 30 days. 60 tablet 1    Fluticasone-Umeclidin-Vilant (TRELEGY ELLIPTA) 200-62.5-25 MCG/INH AEPB Inhale 1 Inhaler into the lungs daily 1 each 5    magnesium oxide (MGO) 400 (241.3 Mg) MG TABS tablet Take 1 tablet by mouth 2 times daily as needed (for cramping) 60 tablet 0    alendronate (FOSAMAX) 70 MG tablet TAKE 1 TABLET BY MOUTH ONE TIME PER WEEK 12 tablet 1    atorvastatin (LIPITOR) 80 MG tablet TAKE 1 TABLET BY MOUTH EVERY DAY FOR CHOLESTEROL 90 tablet 1    albuterol sulfate  (90 Base) MCG/ACT inhaler Inhale 2 puffs into the lungs every 6 hours as needed for Shortness of Breath 1 each 11    Calcium Citrate-Vitamin D 500-500 MG-UNIT CHEW Take 1 tablet by mouth 2 times daily      Blood Glucose Monitoring Suppl (ONE TOUCH ULTRA MINI) w/Device KIT 1 kit by Does not apply route daily 1 kit 0    ONE TOUCH LANCETS MISC 1 each by Does not apply route daily 100 each 3    blood glucose test strips (ASCENSIA AUTODISC VI;ONE TOUCH ULTRA TEST VI) strip 1 each by In Vitro route daily 100 each 3    ipratropium-albuterol (DUONEB) 0.5-2.5 (3) MG/3ML SOLN nebulizer solution Take 1 aerosol every 4 hours for 2 days and then as needed for shortness of breath coughing and wheezing 360 mL 3    Cholecalciferol (VITAMIN D) 2000 units TABS tablet Take 1 tablet by mouth daily 30 tablet     cetirizine (ZYRTEC) 10 MG tablet Take 10 mg by mouth daily as needed for Allergies      Nebulizers (COMPRESSOR/NEBULIZER) MISC 1 Units by Does not apply route 4 times daily 1 each 3    oxyCODONE-acetaminophen (PERCOCET) 7.5-325 MG per tablet Take 1 tablet by mouth every 6 hours as needed for Pain for up to 28 days.  Max 4 a day 112 tablet 0    pantoprazole (PROTONIX) 40 MG tablet Take 1 tablet by mouth daily 30 tablet 1    tiZANidine (ZANAFLEX) 4 MG tablet Take 1 tablet by mouth nightly 30 tablet 1    meloxicam (MOBIC) 15 MG tablet Take 1 tablet by mouth daily 30 tablet 1     No facility-administered medications prior to visit. REVIEW OF SYSTEMS:    Respiratory: Negative for apnea, chest tightness and shortness of breath or change in baseline breathing. PHYSICAL EXAM:   Nursing note and vitals reviewed. BP (!) 163/76   Pulse 86   Ht 5' 4\" (1.626 m)   Wt 136 lb (61.7 kg)   SpO2 96%   BMI 23.34 kg/m²   Constitutional: She appears well-developed and well-nourished. No acute distress. Cardiovascular: Normal rate, regular rhythm, normal heart sounds, and does not have murmur. Pulmonary/Chest: Effort normal. No respiratory distress. She has wheezes in the lung fields. She has no rales. Decreased air exchange. Neurological/Psychiatric:She is alert and oriented to person, place, and time. Coordination is  normal.  Her mood isAppropriate and affect is Neutral/Euthymic(normal) . Her    IMPRESSION:   1. Chronic pain syndrome    2. Fibromyalgia    3. DDD (degenerative disc disease), lumbar    4. Lumbar spondylosis    5. Osteoarthritis of spine with radiculopathy, lumbar region    6. Degeneration of lumbar or lumbosacral intervertebral disc    7. Trochanteric bursitis of right hip        PLAN:  Informed verbal consent was obtained  -[place] was obtained and reviewed  for the last one year and no indicators of drug misuse  were found. Any other controlled substance prescriptions  seen on the record have been accounted for, I am aware of the patient receiving these medications. Adele Sethi  [place] record will be rechecked as part of office protocol.    -Interm history reviewed    -ROM/Stretching exercises as advised   -Continue with Percocet 4 per day  -She was advised to increase fluids ( 5-7  glasses of fluid daily), limit caffeine, avoid cheese products, increase dietary fiber, increase activity and exercise as tolerated and relax regularly and enjoy meals   -Continue with all other adjuvant medications as before      Current Outpatient Medications   Medication Sig Dispense Refill    oxyCODONE-acetaminophen (PERCOCET) 7.5-325 MG per tablet Take 1 tablet by mouth every 6 hours as needed for Pain for up to 28 days. Max 4 a day 112 tablet 0    pantoprazole (PROTONIX) 40 MG tablet Take 1 tablet by mouth daily 30 tablet 1    tiZANidine (ZANAFLEX) 4 MG tablet Take 1 tablet by mouth nightly 30 tablet 1    meloxicam (MOBIC) 15 MG tablet Take 1 tablet by mouth daily 30 tablet 1    DULoxetine (CYMBALTA) 60 MG extended release capsule Take 1 capsule by mouth 2 times daily 60 capsule 1    traZODone (DESYREL) 50 MG tablet Take 1-2 tablets by mouth nightly 60 tablet 1    gabapentin (NEURONTIN) 600 MG tablet Take 1 tablet by mouth 2 times daily for 30 days.  60 tablet 1    Fluticasone-Umeclidin-Vilant (TRELEGY ELLIPTA) 200-62.5-25 MCG/INH AEPB Inhale 1 Inhaler into the lungs daily 1 each 5    magnesium oxide (MGO) 400 (241.3 Mg) MG TABS tablet Take 1 tablet by mouth 2 times daily as needed (for cramping) 60 tablet 0    alendronate (FOSAMAX) 70 MG tablet TAKE 1 TABLET BY MOUTH ONE TIME PER WEEK 12 tablet 1    atorvastatin (LIPITOR) 80 MG tablet TAKE 1 TABLET BY MOUTH EVERY DAY FOR CHOLESTEROL 90 tablet 1    albuterol sulfate  (90 Base) MCG/ACT inhaler Inhale 2 puffs into the lungs every 6 hours as needed for Shortness of Breath 1 each 11    Calcium Citrate-Vitamin D 500-500 MG-UNIT CHEW Take 1 tablet by mouth 2 times daily      Blood Glucose Monitoring Suppl (ONE TOUCH ULTRA MINI) w/Device KIT 1 kit by Does not apply route daily 1 kit 0    ONE TOUCH LANCETS MISC 1 each by Does not apply route daily 100 each 3    blood glucose test strips (ASCENSIA AUTODISC VI;ONE TOUCH ULTRA TEST VI) strip 1 each by In Vitro route daily 100 each 3    ipratropium-albuterol (DUONEB) 0.5-2.5 (3) MG/3ML SOLN nebulizer solution Take 1 aerosol every 4 hours for 2 days and then as needed for shortness of breath coughing and wheezing 360 mL 3    Cholecalciferol (VITAMIN D) 2000 units TABS tablet Take 1 tablet by mouth daily 30 tablet     cetirizine (ZYRTEC) 10 MG tablet Take 10 mg by mouth daily as needed for Allergies      Nebulizers (COMPRESSOR/NEBULIZER) MISC 1 Units by Does not apply route 4 times daily 1 each 3     No current facility-administered medications for this visit. I will continue her current medication regimen  which is part of the above treatment schedule. It has been helping with Ms. Jase Perez chronic  medical problems which for this visit include:   Diagnoses of Chronic pain syndrome, Fibromyalgia, DDD (degenerative disc disease), lumbar, Primary insomnia, Moderate episode of recurrent major depressive disorder ([facility] [address] Lumbar spondylosis, Osteoarthritis of spine with radiculopathy, lumbar region, Gastroesophageal reflux disease without esophagitis, Degeneration of lumbar or lumbosacral intervertebral disc, Trochanteric bursitis of right hip, Muscle cramping, and Medication side effects present, subsequent encounter were pertinent to this visit. Risks and benefits of the medications and other alternative treatments  including no treatment were discussed with the patient. The common side effects of these medications were also explained to the patient. Informed verbal consent was obtained. Goals of current treatment regimen include improvement in pain, restoration of functioning- with focus on improvement in physical performance, general activity, work or disability,emotional distress, health care utilization and  decreased medication consumption. Will continue to monitor progress towards achieving/maintaining therapeutic goals with special emphasis on  1. Improvement in perceived interfernce  of pain with ADL's. Ability to do home exercises independently. Ability to do household chores indoor and/or outdoor work and social and leisure activities. Improve psychosocial and physical functioning. - she is showing progression towards this treatment goal with the current regimen. She was advised against drinking alcohol with the narcotic pain medicines, advised against driving or handling machinery while adjusting the dose of medicines or if having cognitive  issues related to the current medications. Risk of overdose and death, if medicines not taken as prescribed, were also discussed. If the patient develops new symptoms or if the symptoms worsen, the patient should call the office. While transcribing every attempt was made to maintain the accuracy of the note in terms of it's contents,there may have been some errors made inadvertently. Thank you for allowing me to participate in the care of this patient.     Praveen Dubois MD.    Cc: HECTOR Carey - CNP

## 2022-05-19 DIAGNOSIS — F33.1 MODERATE EPISODE OF RECURRENT MAJOR DEPRESSIVE DISORDER (HCC): ICD-10-CM

## 2022-05-19 DIAGNOSIS — G89.4 CHRONIC PAIN SYNDROME: ICD-10-CM

## 2022-05-19 RX ORDER — DULOXETIN HYDROCHLORIDE 60 MG/1
CAPSULE, DELAYED RELEASE ORAL
Qty: 60 CAPSULE | Refills: 0 | Status: SHIPPED | OUTPATIENT
Start: 2022-05-19 | End: 2022-06-14

## 2022-05-23 DIAGNOSIS — R53.82 CHRONIC FATIGUE: ICD-10-CM

## 2022-05-23 LAB
BASOPHILS ABSOLUTE: 0 K/UL (ref 0–0.2)
BASOPHILS RELATIVE PERCENT: 0.7 %
EOSINOPHILS ABSOLUTE: 0.3 K/UL (ref 0–0.6)
EOSINOPHILS RELATIVE PERCENT: 4.3 %
HCT VFR BLD CALC: 42.2 % (ref 36–48)
HEMOGLOBIN: 14.2 G/DL (ref 12–16)
LYMPHOCYTES ABSOLUTE: 1.8 K/UL (ref 1–5.1)
LYMPHOCYTES RELATIVE PERCENT: 27.3 %
MCH RBC QN AUTO: 32.5 PG (ref 26–34)
MCHC RBC AUTO-ENTMCNC: 33.7 G/DL (ref 31–36)
MCV RBC AUTO: 96.3 FL (ref 80–100)
MONOCYTES ABSOLUTE: 0.5 K/UL (ref 0–1.3)
MONOCYTES RELATIVE PERCENT: 7.3 %
NEUTROPHILS ABSOLUTE: 4.1 K/UL (ref 1.7–7.7)
NEUTROPHILS RELATIVE PERCENT: 60.4 %
PDW BLD-RTO: 14.4 % (ref 12.4–15.4)
PLATELET # BLD: 355 K/UL (ref 135–450)
PMV BLD AUTO: 7 FL (ref 5–10.5)
RBC # BLD: 4.38 M/UL (ref 4–5.2)
TSH REFLEX: 2.34 UIU/ML (ref 0.27–4.2)
WBC # BLD: 6.7 K/UL (ref 4–11)

## 2022-06-03 ENCOUNTER — OFFICE VISIT (OUTPATIENT)
Dept: FAMILY MEDICINE CLINIC | Age: 77
End: 2022-06-03
Payer: MEDICARE

## 2022-06-03 VITALS
HEIGHT: 64 IN | BODY MASS INDEX: 23.73 KG/M2 | TEMPERATURE: 98.4 F | SYSTOLIC BLOOD PRESSURE: 120 MMHG | RESPIRATION RATE: 18 BRPM | OXYGEN SATURATION: 92 % | WEIGHT: 139 LBS | HEART RATE: 72 BPM | DIASTOLIC BLOOD PRESSURE: 62 MMHG

## 2022-06-03 DIAGNOSIS — F33.1 MODERATE EPISODE OF RECURRENT MAJOR DEPRESSIVE DISORDER (HCC): ICD-10-CM

## 2022-06-03 DIAGNOSIS — R07.9 CHEST PAIN, UNSPECIFIED TYPE: ICD-10-CM

## 2022-06-03 DIAGNOSIS — G89.4 CHRONIC PAIN SYNDROME: ICD-10-CM

## 2022-06-03 DIAGNOSIS — K21.9 GASTROESOPHAGEAL REFLUX DISEASE WITHOUT ESOPHAGITIS: ICD-10-CM

## 2022-06-03 DIAGNOSIS — J44.9 COPD, SEVERE (HCC): ICD-10-CM

## 2022-06-03 DIAGNOSIS — E11.9 CONTROLLED TYPE 2 DIABETES MELLITUS WITHOUT COMPLICATION, WITHOUT LONG-TERM CURRENT USE OF INSULIN (HCC): ICD-10-CM

## 2022-06-03 DIAGNOSIS — Z12.11 COLON CANCER SCREENING: ICD-10-CM

## 2022-06-03 DIAGNOSIS — E78.00 HYPERCHOLESTEREMIA: ICD-10-CM

## 2022-06-03 DIAGNOSIS — M81.0 AGE-RELATED OSTEOPOROSIS WITHOUT CURRENT PATHOLOGICAL FRACTURE: ICD-10-CM

## 2022-06-03 DIAGNOSIS — M79.7 FIBROMYALGIA: ICD-10-CM

## 2022-06-03 DIAGNOSIS — M51.36 DDD (DEGENERATIVE DISC DISEASE), LUMBAR: ICD-10-CM

## 2022-06-03 DIAGNOSIS — R06.09 DOE (DYSPNEA ON EXERTION): ICD-10-CM

## 2022-06-03 LAB
CREATININE URINE: 129.4 MG/DL (ref 28–259)
HBA1C MFR BLD: 5.9 %
MICROALBUMIN UR-MCNC: <1.2 MG/DL
MICROALBUMIN/CREAT UR-RTO: NORMAL MG/G (ref 0–30)

## 2022-06-03 PROCEDURE — 3017F COLORECTAL CA SCREEN DOC REV: CPT | Performed by: NURSE PRACTITIONER

## 2022-06-03 PROCEDURE — 1090F PRES/ABSN URINE INCON ASSESS: CPT | Performed by: NURSE PRACTITIONER

## 2022-06-03 PROCEDURE — 99214 OFFICE O/P EST MOD 30 MIN: CPT | Performed by: NURSE PRACTITIONER

## 2022-06-03 PROCEDURE — 3023F SPIROM DOC REV: CPT | Performed by: NURSE PRACTITIONER

## 2022-06-03 PROCEDURE — 4004F PT TOBACCO SCREEN RCVD TLK: CPT | Performed by: NURSE PRACTITIONER

## 2022-06-03 PROCEDURE — G8420 CALC BMI NORM PARAMETERS: HCPCS | Performed by: NURSE PRACTITIONER

## 2022-06-03 PROCEDURE — G8399 PT W/DXA RESULTS DOCUMENT: HCPCS | Performed by: NURSE PRACTITIONER

## 2022-06-03 PROCEDURE — G8427 DOCREV CUR MEDS BY ELIG CLIN: HCPCS | Performed by: NURSE PRACTITIONER

## 2022-06-03 PROCEDURE — 1123F ACP DISCUSS/DSCN MKR DOCD: CPT | Performed by: NURSE PRACTITIONER

## 2022-06-03 PROCEDURE — 2022F DILAT RTA XM EVC RTNOPTHY: CPT | Performed by: NURSE PRACTITIONER

## 2022-06-03 PROCEDURE — 83036 HEMOGLOBIN GLYCOSYLATED A1C: CPT | Performed by: NURSE PRACTITIONER

## 2022-06-03 PROCEDURE — 3044F HG A1C LEVEL LT 7.0%: CPT | Performed by: NURSE PRACTITIONER

## 2022-06-03 RX ORDER — ATORVASTATIN CALCIUM 80 MG/1
TABLET, FILM COATED ORAL
Qty: 90 TABLET | Refills: 1 | Status: SHIPPED | OUTPATIENT
Start: 2022-06-03 | End: 2022-08-02

## 2022-06-03 ASSESSMENT — ENCOUNTER SYMPTOMS
WHEEZING: 1
EYE ITCHING: 0
SHORTNESS OF BREATH: 1
COUGH: 1
ABDOMINAL PAIN: 0
SINUS PAIN: 0
CHEST TIGHTNESS: 0

## 2022-06-07 ENCOUNTER — OFFICE VISIT (OUTPATIENT)
Dept: PAIN MANAGEMENT | Age: 77
End: 2022-06-07
Payer: MEDICARE

## 2022-06-07 VITALS
BODY MASS INDEX: 23.58 KG/M2 | SYSTOLIC BLOOD PRESSURE: 155 MMHG | HEART RATE: 87 BPM | OXYGEN SATURATION: 92 % | WEIGHT: 137.4 LBS | DIASTOLIC BLOOD PRESSURE: 70 MMHG

## 2022-06-07 DIAGNOSIS — G89.4 CHRONIC PAIN SYNDROME: ICD-10-CM

## 2022-06-07 DIAGNOSIS — M47.816 LUMBAR SPONDYLOSIS: ICD-10-CM

## 2022-06-07 DIAGNOSIS — M51.36 DDD (DEGENERATIVE DISC DISEASE), LUMBAR: ICD-10-CM

## 2022-06-07 DIAGNOSIS — M47.26 OSTEOARTHRITIS OF SPINE WITH RADICULOPATHY, LUMBAR REGION: ICD-10-CM

## 2022-06-07 DIAGNOSIS — M51.37 DEGENERATION OF LUMBAR OR LUMBOSACRAL INTERVERTEBRAL DISC: ICD-10-CM

## 2022-06-07 DIAGNOSIS — M79.7 FIBROMYALGIA: ICD-10-CM

## 2022-06-07 DIAGNOSIS — M70.61 TROCHANTERIC BURSITIS OF RIGHT HIP: ICD-10-CM

## 2022-06-07 PROCEDURE — 1123F ACP DISCUSS/DSCN MKR DOCD: CPT | Performed by: INTERNAL MEDICINE

## 2022-06-07 PROCEDURE — 99213 OFFICE O/P EST LOW 20 MIN: CPT | Performed by: INTERNAL MEDICINE

## 2022-06-07 PROCEDURE — G8427 DOCREV CUR MEDS BY ELIG CLIN: HCPCS | Performed by: INTERNAL MEDICINE

## 2022-06-07 PROCEDURE — G8420 CALC BMI NORM PARAMETERS: HCPCS | Performed by: INTERNAL MEDICINE

## 2022-06-07 PROCEDURE — G8399 PT W/DXA RESULTS DOCUMENT: HCPCS | Performed by: INTERNAL MEDICINE

## 2022-06-07 PROCEDURE — 3017F COLORECTAL CA SCREEN DOC REV: CPT | Performed by: INTERNAL MEDICINE

## 2022-06-07 PROCEDURE — 1090F PRES/ABSN URINE INCON ASSESS: CPT | Performed by: INTERNAL MEDICINE

## 2022-06-07 PROCEDURE — 4004F PT TOBACCO SCREEN RCVD TLK: CPT | Performed by: INTERNAL MEDICINE

## 2022-06-07 RX ORDER — OXYCODONE AND ACETAMINOPHEN 7.5; 325 MG/1; MG/1
1 TABLET ORAL EVERY 6 HOURS PRN
Qty: 112 TABLET | Refills: 0 | Status: ON HOLD | OUTPATIENT
Start: 2022-06-07 | End: 2022-07-13 | Stop reason: HOSPADM

## 2022-06-07 RX ORDER — PANTOPRAZOLE SODIUM 40 MG/1
TABLET, DELAYED RELEASE ORAL
Qty: 30 TABLET | Refills: 1 | OUTPATIENT
Start: 2022-06-07

## 2022-06-07 RX ORDER — PANTOPRAZOLE SODIUM 40 MG/1
40 TABLET, DELAYED RELEASE ORAL DAILY
Qty: 30 TABLET | Refills: 1 | Status: SHIPPED | OUTPATIENT
Start: 2022-06-07 | End: 2022-09-20 | Stop reason: SDUPTHER

## 2022-06-07 RX ORDER — TIZANIDINE 4 MG/1
4 TABLET ORAL NIGHTLY
Qty: 30 TABLET | Refills: 1 | OUTPATIENT
Start: 2022-06-07

## 2022-06-07 NOTE — PROGRESS NOTES
Pat Nairvladimir  1945  [id number]      HISTORY OF PRESENT ILLNESS:  Ms. Vita Domínguez is a 78 y.o. female returns for a follow up visit for pain management  She has a diagnosis of   1. Chronic pain syndrome    2. DDD (degenerative disc disease), lumbar    3. Osteoarthritis of spine with radiculopathy, lumbar region    4. Trochanteric bursitis of right hip    5. Muscle cramping    6. Gastroesophageal reflux disease without esophagitis    7. Fibromyalgia    8. Lumbar spondylosis    9. Degeneration of lumbar or lumbosacral intervertebral disc    10. Moderate episode of recurrent major depressive disorder ([facility] [address]    11. Primary insomnia    12. Medication side effects present, subsequent encounter    . She complains of pain in the lower back, right hip  She rates the pain 5/10 and describes it as aching, burning. Current treatment regimen has helped relieve about 50% of the pain. She denies any side effects from the current pain regimen. Patient reports that since the last follow up visit the physical functioning is unchanged, family/social relationships are unchanged, mood is unchanged sleep patterns are unchanged, and that the overall functioning is unchanged. Patient denies misusing/abusing her narcotic pain medications or using any illegal drugs. There are No indicators for possible drug abuse, addiction or diversion problems. Patient states she has been doing fair, pain has been manageable somewhat with the medications. She says she is using all the adjuvants along with Percocet 4 per day. She denies any constipation symptoms. She reports she has been managing light house chores and her ADLs. ALLERGIES: Patients list of allergies were reviewed     MEDICATIONS: Ms. Vita Domínguez list of medications were reviewed. Her current medications are   Outpatient Medications Prior to Visit   Medication Sig Dispense Refill    atorvastatin (LIPITOR) 80 MG tablet TAKE 1 TABLET BY MOUTH EVERY DAY FOR CHOLESTEROL 90 tablet 1    DULoxetine (CYMBALTA) 60 MG extended release capsule TAKE 1 CAPSULE BY MOUTH TWICE A DAY 60 capsule 0    tiZANidine (ZANAFLEX) 4 MG tablet Take 1 tablet by mouth nightly 30 tablet 1    meloxicam (MOBIC) 15 MG tablet Take 1 tablet by mouth daily 30 tablet 1    traZODone (DESYREL) 50 MG tablet Take 1-2 tablets by mouth nightly 60 tablet 1    Fluticasone-Umeclidin-Vilant (TRELEGY ELLIPTA) 200-62.5-25 MCG/INH AEPB Inhale 1 Inhaler into the lungs daily 1 each 5    magnesium oxide (MGO) 400 (241.3 Mg) MG TABS tablet Take 1 tablet by mouth 2 times daily as needed (for cramping) 60 tablet 0    alendronate (FOSAMAX) 70 MG tablet TAKE 1 TABLET BY MOUTH ONE TIME PER WEEK 12 tablet 1    albuterol sulfate  (90 Base) MCG/ACT inhaler Inhale 2 puffs into the lungs every 6 hours as needed for Shortness of Breath 1 each 11    Calcium Citrate-Vitamin D 500-500 MG-UNIT CHEW Take 1 tablet by mouth 2 times daily      Blood Glucose Monitoring Suppl (ONE TOUCH ULTRA MINI) w/Device KIT 1 kit by Does not apply route daily 1 kit 0    ONE TOUCH LANCETS MISC 1 each by Does not apply route daily 100 each 3    blood glucose test strips (ASCENSIA AUTODISC VI;ONE TOUCH ULTRA TEST VI) strip 1 each by In Vitro route daily 100 each 3    ipratropium-albuterol (DUONEB) 0.5-2.5 (3) MG/3ML SOLN nebulizer solution Take 1 aerosol every 4 hours for 2 days and then as needed for shortness of breath coughing and wheezing 360 mL 3    Cholecalciferol (VITAMIN D) 2000 units TABS tablet Take 1 tablet by mouth daily 30 tablet     cetirizine (ZYRTEC) 10 MG tablet Take 10 mg by mouth daily as needed for Allergies      Nebulizers (COMPRESSOR/NEBULIZER) MISC 1 Units by Does not apply route 4 times daily 1 each 3    oxyCODONE-acetaminophen (PERCOCET) 7.5-325 MG per tablet Take 1 tablet by mouth every 6 hours as needed for Pain for up to 28 days.  Max 4 a day 112 tablet 0    pantoprazole (PROTONIX) 40 MG tablet Take 1 tablet by mouth daily 30 tablet 1    gabapentin (NEURONTIN) 600 MG tablet Take 1 tablet by mouth 2 times daily for 30 days. 60 tablet 1     No facility-administered medications prior to visit. REVIEW OF SYSTEMS:    Respiratory: Negative for apnea, chest tightness and shortness of breath or change in baseline breathing. PHYSICAL EXAM:   Nursing note and vitals reviewed. BP (!) 155/70   Pulse 87   Wt 137 lb 6.4 oz (62.3 kg)   SpO2 92%   BMI 23.58 kg/m²   Constitutional: She appears well-developed and well-nourished. No acute distress. Cardiovascular: Normal rate, regular rhythm, normal heart sounds, and does not have murmur. Pulmonary/Chest: Effort normal. No respiratory distress. She does not have wheezes in the lung fields. She has no rales. + decrease air exchange bilaterally     Neurological/Psychiatric:She is alert and oriented to person, place, and time. Coordination is  normal.  Her mood isAppropriate and affect is Neutral/Euthymic(normal) . IMPRESSION:   1. Chronic pain syndrome    2. DDD (degenerative disc disease), lumbar    3. Osteoarthritis of spine with radiculopathy, lumbar region    4. Trochanteric bursitis of right hip    5. Fibromyalgia    6. Lumbar spondylosis    7. Degeneration of lumbar or lumbosacral intervertebral disc        PLAN:  Informed verbal consent was obtained  -ROM/Stretching exercises as advised   -She was advised to increase fluids ( 5-7  glasses of fluid daily), limit caffeine, avoid cheese products, increase dietary fiber, increase activity and exercise as tolerated and relax regularly and enjoy meals   -Continue with Percocet 4 per day   -Walking as tolerated 20 minutes daily   -Advised patient to quit smoking for  health related concerns and to improve the treatment outcomes. Education was given on quitting smoking and the use of different modalities including medications, hypnotherapy, counselling  and biofeedback. These were discussed with patient.     Current Outpatient Medications   Medication Sig Dispense Refill    oxyCODONE-acetaminophen (PERCOCET) 7.5-325 MG per tablet Take 1 tablet by mouth every 6 hours as needed for Pain for up to 28 days.  Max 4 a day 112 tablet 0    pantoprazole (PROTONIX) 40 MG tablet Take 1 tablet by mouth daily 30 tablet 1    atorvastatin (LIPITOR) 80 MG tablet TAKE 1 TABLET BY MOUTH EVERY DAY FOR CHOLESTEROL 90 tablet 1    DULoxetine (CYMBALTA) 60 MG extended release capsule TAKE 1 CAPSULE BY MOUTH TWICE A DAY 60 capsule 0    tiZANidine (ZANAFLEX) 4 MG tablet Take 1 tablet by mouth nightly 30 tablet 1    meloxicam (MOBIC) 15 MG tablet Take 1 tablet by mouth daily 30 tablet 1    traZODone (DESYREL) 50 MG tablet Take 1-2 tablets by mouth nightly 60 tablet 1    Fluticasone-Umeclidin-Vilant (TRELEGY ELLIPTA) 200-62.5-25 MCG/INH AEPB Inhale 1 Inhaler into the lungs daily 1 each 5    magnesium oxide (MGO) 400 (241.3 Mg) MG TABS tablet Take 1 tablet by mouth 2 times daily as needed (for cramping) 60 tablet 0    alendronate (FOSAMAX) 70 MG tablet TAKE 1 TABLET BY MOUTH ONE TIME PER WEEK 12 tablet 1    albuterol sulfate  (90 Base) MCG/ACT inhaler Inhale 2 puffs into the lungs every 6 hours as needed for Shortness of Breath 1 each 11    Calcium Citrate-Vitamin D 500-500 MG-UNIT CHEW Take 1 tablet by mouth 2 times daily      Blood Glucose Monitoring Suppl (ONE TOUCH ULTRA MINI) w/Device KIT 1 kit by Does not apply route daily 1 kit 0    ONE TOUCH LANCETS MISC 1 each by Does not apply route daily 100 each 3    blood glucose test strips (ASCENSIA AUTODISC VI;ONE TOUCH ULTRA TEST VI) strip 1 each by In Vitro route daily 100 each 3    ipratropium-albuterol (DUONEB) 0.5-2.5 (3) MG/3ML SOLN nebulizer solution Take 1 aerosol every 4 hours for 2 days and then as needed for shortness of breath coughing and wheezing 360 mL 3    Cholecalciferol (VITAMIN D) 2000 units TABS tablet Take 1 tablet by mouth daily 30 tablet     cetirizine (ZYRTEC) 10 MG tablet Take 10 mg by mouth daily as needed for Allergies      Nebulizers (COMPRESSOR/NEBULIZER) MISC 1 Units by Does not apply route 4 times daily 1 each 3    gabapentin (NEURONTIN) 600 MG tablet Take 1 tablet by mouth 2 times daily for 30 days. 60 tablet 1     No current facility-administered medications for this visit. I will continue her current medication regimen  which is part of the above treatment schedule. It has been helping with Ms. Ivan Kaur chronic  medical problems which for this visit include:   Diagnoses of Chronic pain syndrome, DDD (degenerative disc disease), lumbar, Osteoarthritis of spine with radiculopathy, lumbar region, Trochanteric bursitis of right hip, Muscle cramping, Gastroesophageal reflux disease without esophagitis, Fibromyalgia, Lumbar spondylosis, Degeneration of lumbar or lumbosacral intervertebral disc, Moderate episode of recurrent major depressive disorder ([address] Primary insomnia, and Medication side effects present, subsequent encounter were pertinent to this visit. Risks and benefits of the medications and other alternative treatments  including no treatment were discussed with the patient. The common side effects of these medications were also explained to the patient. Informed verbal consent was obtained. Goals of current treatment regimen include improvement in pain, restoration of functioning- with focus on improvement in physical performance, general activity, work or disability,emotional distress, health care utilization and  decreased medication consumption. Will continue to monitor progress towards achieving/maintaining therapeutic goals with special emphasis on  1. Improvement in perceived interfernce  of pain with ADL's. Ability to do home exercises independently. Ability to do household chores indoor and/or outdoor work and social and leisure activities. Improve psychosocial and physical functioning.- she is showing progression towards this treatment goal with the current regimen. She was advised against drinking alcohol with the narcotic pain medicines, advised against driving or handling machinery while adjusting the dose of medicines or if having cognitive  issues related to the current medications. Risk of overdose and death, if medicines not taken as prescribed, were also discussed. If the patient develops new symptoms or if the symptoms worsen, the patient should call the office. While transcribing every attempt was made to maintain the accuracy of the note in terms of it's contents,there may have been some errors made inadvertently. Thank you for allowing me to participate in the care of this patient.     Jenelle Connors MD.    Cc: Suad Fisher, HECTOR - CNP

## 2022-06-14 DIAGNOSIS — F33.1 MODERATE EPISODE OF RECURRENT MAJOR DEPRESSIVE DISORDER (HCC): ICD-10-CM

## 2022-06-14 DIAGNOSIS — G89.4 CHRONIC PAIN SYNDROME: ICD-10-CM

## 2022-06-14 RX ORDER — DULOXETIN HYDROCHLORIDE 60 MG/1
CAPSULE, DELAYED RELEASE ORAL
Qty: 60 CAPSULE | Refills: 2 | Status: SHIPPED | OUTPATIENT
Start: 2022-06-14 | End: 2022-09-09

## 2022-06-20 ENCOUNTER — OFFICE VISIT (OUTPATIENT)
Dept: PULMONOLOGY | Age: 77
End: 2022-06-20
Payer: MEDICARE

## 2022-06-20 ENCOUNTER — HOSPITAL ENCOUNTER (OUTPATIENT)
Dept: CT IMAGING | Age: 77
Discharge: HOME | End: 2022-06-20
Payer: MEDICARE

## 2022-06-20 VITALS
HEIGHT: 64 IN | OXYGEN SATURATION: 93 % | TEMPERATURE: 97.4 F | BODY MASS INDEX: 23.56 KG/M2 | SYSTOLIC BLOOD PRESSURE: 90 MMHG | HEART RATE: 89 BPM | DIASTOLIC BLOOD PRESSURE: 60 MMHG | WEIGHT: 138 LBS | RESPIRATION RATE: 16 BRPM

## 2022-06-20 DIAGNOSIS — R91.1 PULMONARY NODULE SEEN ON IMAGING STUDY: ICD-10-CM

## 2022-06-20 DIAGNOSIS — J44.9 COPD, SEVERE (HCC): Primary | ICD-10-CM

## 2022-06-20 PROCEDURE — 1123F ACP DISCUSS/DSCN MKR DOCD: CPT | Performed by: INTERNAL MEDICINE

## 2022-06-20 PROCEDURE — 71250 CT THORAX DX C-: CPT

## 2022-06-20 PROCEDURE — 99214 OFFICE O/P EST MOD 30 MIN: CPT | Performed by: INTERNAL MEDICINE

## 2022-06-20 PROCEDURE — 3017F COLORECTAL CA SCREEN DOC REV: CPT | Performed by: INTERNAL MEDICINE

## 2022-06-20 PROCEDURE — G8399 PT W/DXA RESULTS DOCUMENT: HCPCS | Performed by: INTERNAL MEDICINE

## 2022-06-20 PROCEDURE — G8427 DOCREV CUR MEDS BY ELIG CLIN: HCPCS | Performed by: INTERNAL MEDICINE

## 2022-06-20 PROCEDURE — 4004F PT TOBACCO SCREEN RCVD TLK: CPT | Performed by: INTERNAL MEDICINE

## 2022-06-20 PROCEDURE — 3023F SPIROM DOC REV: CPT | Performed by: INTERNAL MEDICINE

## 2022-06-20 PROCEDURE — 1090F PRES/ABSN URINE INCON ASSESS: CPT | Performed by: INTERNAL MEDICINE

## 2022-06-20 PROCEDURE — G8420 CALC BMI NORM PARAMETERS: HCPCS | Performed by: INTERNAL MEDICINE

## 2022-06-20 RX ORDER — PREDNISONE 10 MG/1
TABLET ORAL
Qty: 30 TABLET | Refills: 0 | Status: SHIPPED | OUTPATIENT
Start: 2022-06-20 | End: 2022-06-30

## 2022-06-20 ASSESSMENT — COPD QUESTIONNAIRES
QUESTION4_WALKINCLINE: 2
QUESTION2_CHESTPHLEGM: 2
QUESTION8_ENERGYLEVEL: 4
QUESTION5_HOMEACTIVITIES: 3
QUESTION1_COUGHFREQUENCY: 2
QUESTION3_CHESTTIGHTNESS: 2
QUESTION6_LEAVINGHOUSE: 1
QUESTION7_SLEEPQUALITY: 1
CAT_TOTALSCORE: 17

## 2022-06-20 NOTE — PROGRESS NOTES
REASON FOR CONSULTATION/CC: COPD          PCP: HECTOR Hernandez CNP    HISTORY OF PRESENT ILLNESS: Mona Espinoza is a 78y.o. year old female with a history of COPD who presents :           COPD Assessment Test (CAT)  Cough Assessment: 2  Phlegm Assessment: 2  Chest tightness: 2  Walking on an incline: 2  Home Activities: 3  Confident Leaving The Home: 1  Sleeping Soundly: 1  Have Energy: 4  Assessment Score: 17            COPD  Changed to Trelegy after trial of Breztri secondary to cost.    Duoneb's  using 2-3 times per day       Fleischner Society Recommendations:         Objective:   PHYSICAL EXAM:  Blood pressure 90/60, pulse 89, temperature 97.4 °F (36.3 °C), temperature source Infrared, resp. rate 16, height 5' 4\" (1.626 m), weight 138 lb (62.6 kg), SpO2 93 %, not currently breastfeeding.'    Gen: No distress. Eyes:    ENT:    Neck:    Resp: No accessory muscle use. No crackles. Few wheezes. No rhonchi. CV: Regular rate. Regular rhythm. No murmur or rub. No edema. GI:    Skin:    Lymph:    M/S: No cyanosis. No clubbing. Neuro: Moves all four extremities. Psych: Oriented x 3. No anxiety. Awake. Alert. Intact judgement and insight. Data Reviewed:        Assessment:     COPD, Moderately severe FEV1 59%  Tobacco abuse. Pulmonary nodules, new pulmonary nodule 2022      Plan:        Problem List Items Addressed This Visit     Pulmonary nodule seen on imaging study      As of this writing, CT report has not been completed. Reviewed with the patient. Left lower lobe pulmonary nodule appear to be new. 4 mm. Therefore, recommended follow-up in 1 year based on Fleischner Society criteria. Patient expressed understanding. COPD, severe ([address] - Primary      Mostly controlled with Trelegy daily. DuoNebs 2-3 times daily. Relevant Medications    predniSONE (DELTASONE) 10 MG tablet        This note was transcribed using [id number] [employer].  Please disregard any translational errors.

## 2022-06-21 PROBLEM — R91.1 PULMONARY NODULE SEEN ON IMAGING STUDY: Status: ACTIVE | Noted: 2022-06-21

## 2022-06-21 NOTE — ASSESSMENT & PLAN NOTE
As of this writing, CT report has not been completed. Reviewed with the patient. Left lower lobe pulmonary nodule appear to be new. 4 mm. Therefore, recommended follow-up in 1 year based on Fleischner Society criteria. Patient expressed understanding.

## 2022-07-03 ENCOUNTER — APPOINTMENT (OUTPATIENT)
Dept: GENERAL RADIOLOGY | Age: 77
DRG: 871 | End: 2022-07-03
Payer: MEDICARE

## 2022-07-03 ENCOUNTER — HOSPITAL ENCOUNTER (INPATIENT)
Age: 77
LOS: 10 days | Discharge: NURSING, REHAB OR LONG-TERM CARE | DRG: 871 | End: 2022-07-13
Attending: EMERGENCY MEDICINE | Admitting: INTERNAL MEDICINE
Payer: MEDICARE

## 2022-07-03 DIAGNOSIS — M70.61 TROCHANTERIC BURSITIS OF RIGHT HIP: ICD-10-CM

## 2022-07-03 DIAGNOSIS — M79.7 FIBROMYALGIA: ICD-10-CM

## 2022-07-03 DIAGNOSIS — N39.0 URINARY TRACT INFECTION IN FEMALE: ICD-10-CM

## 2022-07-03 DIAGNOSIS — R65.20 SEPSIS WITH ACUTE RENAL FAILURE WITHOUT SEPTIC SHOCK, DUE TO UNSPECIFIED ORGANISM, UNSPECIFIED ACUTE RENAL FAILURE TYPE (HCC): ICD-10-CM

## 2022-07-03 DIAGNOSIS — G89.4 CHRONIC PAIN SYNDROME: ICD-10-CM

## 2022-07-03 DIAGNOSIS — N17.9 SEPSIS WITH ACUTE RENAL FAILURE WITHOUT SEPTIC SHOCK, DUE TO UNSPECIFIED ORGANISM, UNSPECIFIED ACUTE RENAL FAILURE TYPE (HCC): ICD-10-CM

## 2022-07-03 DIAGNOSIS — M47.816 LUMBAR SPONDYLOSIS: ICD-10-CM

## 2022-07-03 DIAGNOSIS — J44.9 CHRONIC OBSTRUCTIVE PULMONARY DISEASE, UNSPECIFIED COPD TYPE (HCC): ICD-10-CM

## 2022-07-03 DIAGNOSIS — A41.9 SEPSIS WITH ACUTE RENAL FAILURE WITHOUT SEPTIC SHOCK, DUE TO UNSPECIFIED ORGANISM, UNSPECIFIED ACUTE RENAL FAILURE TYPE (HCC): ICD-10-CM

## 2022-07-03 DIAGNOSIS — J18.9 COMMUNITY ACQUIRED PNEUMONIA OF RIGHT LUNG, UNSPECIFIED PART OF LUNG: Primary | ICD-10-CM

## 2022-07-03 DIAGNOSIS — M47.26 OSTEOARTHRITIS OF SPINE WITH RADICULOPATHY, LUMBAR REGION: ICD-10-CM

## 2022-07-03 DIAGNOSIS — R09.02 HYPOXIA: ICD-10-CM

## 2022-07-03 DIAGNOSIS — M51.36 DDD (DEGENERATIVE DISC DISEASE), LUMBAR: ICD-10-CM

## 2022-07-03 DIAGNOSIS — M51.37 DEGENERATION OF LUMBAR OR LUMBOSACRAL INTERVERTEBRAL DISC: ICD-10-CM

## 2022-07-03 PROBLEM — R79.89 ELEVATED LACTIC ACID LEVEL: Status: ACTIVE | Noted: 2022-07-03

## 2022-07-03 PROBLEM — R53.1 GENERAL WEAKNESS: Status: ACTIVE | Noted: 2022-07-03

## 2022-07-03 LAB
AMORPHOUS: ABNORMAL /HPF
ANION GAP SERPL CALCULATED.3IONS-SCNC: 20 MMOL/L (ref 3–16)
BACTERIA: ABNORMAL /HPF
BANDED NEUTROPHILS RELATIVE PERCENT: 23 % (ref 0–7)
BASE EXCESS VENOUS: -3.1 MMOL/L
BASOPHILS ABSOLUTE: 0 K/UL (ref 0–0.2)
BASOPHILS RELATIVE PERCENT: 0 %
BILIRUBIN URINE: ABNORMAL
BLOOD, URINE: NEGATIVE
BUN BLDV-MCNC: 69 MG/DL (ref 7–20)
CALCIUM SERPL-MCNC: 8.7 MG/DL (ref 8.3–10.6)
CARBOXYHEMOGLOBIN: 1.3 %
CELLULAR CASTS: ABNORMAL /LPF
CHLORIDE BLD-SCNC: 92 MMOL/L (ref 99–110)
CLARITY: ABNORMAL
CO2: 18 MMOL/L (ref 21–32)
COARSE CASTS, UA: >40 /LPF (ref 0–2)
COLOR: ABNORMAL
COMMENT UA: ABNORMAL
CREAT SERPL-MCNC: 4.1 MG/DL (ref 0.6–1.2)
CRYSTALS, UA: ABNORMAL /HPF
DOHLE BODIES: PRESENT
EOSINOPHILS ABSOLUTE: 0 K/UL (ref 0–0.6)
EOSINOPHILS RELATIVE PERCENT: 0 %
EPITHELIAL CELLS, UA: 8 /HPF (ref 0–5)
GFR AFRICAN AMERICAN: 13
GFR NON-AFRICAN AMERICAN: 11
GLUCOSE BLD-MCNC: 127 MG/DL (ref 70–99)
GLUCOSE BLD-MCNC: 128 MG/DL (ref 70–99)
GLUCOSE URINE: NEGATIVE MG/DL
HCO3 VENOUS: 21 MMOL/L (ref 23–29)
HCT VFR BLD CALC: 42.4 % (ref 36–48)
HEMOGLOBIN: 14.6 G/DL (ref 12–16)
HYALINE CASTS: 107 /LPF (ref 0–8)
KETONES, URINE: ABNORMAL MG/DL
LACTIC ACID, SEPSIS: 1.8 MMOL/L (ref 0.4–1.9)
LACTIC ACID, SEPSIS: 2 MMOL/L (ref 0.4–1.9)
LACTIC ACID: 2.3 MMOL/L (ref 0.4–2)
LEUKOCYTE ESTERASE, URINE: ABNORMAL
LYMPHOCYTES ABSOLUTE: 0.1 K/UL (ref 1–5.1)
LYMPHOCYTES RELATIVE PERCENT: 1 %
MCH RBC QN AUTO: 33.1 PG (ref 26–34)
MCHC RBC AUTO-ENTMCNC: 34.5 G/DL (ref 31–36)
MCV RBC AUTO: 95.9 FL (ref 80–100)
METAMYELOCYTES RELATIVE PERCENT: 6 %
METHEMOGLOBIN VENOUS: 0.4 %
MICROSCOPIC EXAMINATION: YES
MONOCYTES ABSOLUTE: 0.3 K/UL (ref 0–1.3)
MONOCYTES RELATIVE PERCENT: 4 %
MUCUS: ABNORMAL /LPF
NEUTROPHILS ABSOLUTE: 7.1 K/UL (ref 1.7–7.7)
NEUTROPHILS RELATIVE PERCENT: 66 %
NITRITE, URINE: NEGATIVE
O2 SAT, VEN: 87 %
O2 THERAPY: ABNORMAL
PCO2, VEN: 35.1 MMHG (ref 40–50)
PDW BLD-RTO: 13.8 % (ref 12.4–15.4)
PERFORMED ON: ABNORMAL
PH UA: 5 (ref 5–8)
PH VENOUS: 7.39 (ref 7.35–7.45)
PLATELET # BLD: 278 K/UL (ref 135–450)
PMV BLD AUTO: 7.8 FL (ref 5–10.5)
PO2, VEN: 54 MMHG
POTASSIUM REFLEX MAGNESIUM: 3.7 MMOL/L (ref 3.5–5.1)
PRO-BNP: 1850 PG/ML (ref 0–449)
PROCALCITONIN: 11.67 NG/ML (ref 0–0.15)
PROTEIN UA: 100 MG/DL
RAPID INFLUENZA  B AGN: NEGATIVE
RAPID INFLUENZA A AGN: NEGATIVE
RBC # BLD: 4.42 M/UL (ref 4–5.2)
RBC UA: ABNORMAL /HPF (ref 0–4)
RENAL EPITHELIAL, UA: ABNORMAL /HPF (ref 0–1)
SARS-COV-2, NAAT: NOT DETECTED
SLIDE REVIEW: ABNORMAL
SODIUM BLD-SCNC: 130 MMOL/L (ref 136–145)
SPECIFIC GRAVITY UA: 1.02 (ref 1–1.03)
TCO2 CALC VENOUS: 22 MMOL/L
TROPONIN: 0.01 NG/ML
URINE REFLEX TO CULTURE: ABNORMAL
URINE TYPE: ABNORMAL
UROBILINOGEN, URINE: 1 E.U./DL
WBC # BLD: 7.5 K/UL (ref 4–11)
WBC UA: ABNORMAL /HPF (ref 0–5)

## 2022-07-03 PROCEDURE — 87641 MR-STAPH DNA AMP PROBE: CPT

## 2022-07-03 PROCEDURE — 93005 ELECTROCARDIOGRAM TRACING: CPT | Performed by: PHYSICIAN ASSISTANT

## 2022-07-03 PROCEDURE — 83605 ASSAY OF LACTIC ACID: CPT

## 2022-07-03 PROCEDURE — 99285 EMERGENCY DEPT VISIT HI MDM: CPT

## 2022-07-03 PROCEDURE — 2580000003 HC RX 258: Performed by: PHYSICIAN ASSISTANT

## 2022-07-03 PROCEDURE — 83880 ASSAY OF NATRIURETIC PEPTIDE: CPT

## 2022-07-03 PROCEDURE — 6360000002 HC RX W HCPCS: Performed by: PHYSICIAN ASSISTANT

## 2022-07-03 PROCEDURE — 87449 NOS EACH ORGANISM AG IA: CPT

## 2022-07-03 PROCEDURE — 2700000000 HC OXYGEN THERAPY PER DAY

## 2022-07-03 PROCEDURE — 6370000000 HC RX 637 (ALT 250 FOR IP): Performed by: INTERNAL MEDICINE

## 2022-07-03 PROCEDURE — 83036 HEMOGLOBIN GLYCOSYLATED A1C: CPT

## 2022-07-03 PROCEDURE — 87150 DNA/RNA AMPLIFIED PROBE: CPT

## 2022-07-03 PROCEDURE — 71045 X-RAY EXAM CHEST 1 VIEW: CPT

## 2022-07-03 PROCEDURE — 96360 HYDRATION IV INFUSION INIT: CPT

## 2022-07-03 PROCEDURE — 94640 AIRWAY INHALATION TREATMENT: CPT

## 2022-07-03 PROCEDURE — 2580000003 HC RX 258: Performed by: INTERNAL MEDICINE

## 2022-07-03 PROCEDURE — 87804 INFLUENZA ASSAY W/OPTIC: CPT

## 2022-07-03 PROCEDURE — 87205 SMEAR GRAM STAIN: CPT

## 2022-07-03 PROCEDURE — 87181 SC STD AGAR DILUTION PER AGT: CPT

## 2022-07-03 PROCEDURE — 94760 N-INVAS EAR/PLS OXIMETRY 1: CPT

## 2022-07-03 PROCEDURE — 36415 COLL VENOUS BLD VENIPUNCTURE: CPT

## 2022-07-03 PROCEDURE — 87070 CULTURE OTHR SPECIMN AEROBIC: CPT

## 2022-07-03 PROCEDURE — 80048 BASIC METABOLIC PNL TOTAL CA: CPT

## 2022-07-03 PROCEDURE — 85025 COMPLETE CBC W/AUTO DIFF WBC: CPT

## 2022-07-03 PROCEDURE — 81001 URINALYSIS AUTO W/SCOPE: CPT

## 2022-07-03 PROCEDURE — 96361 HYDRATE IV INFUSION ADD-ON: CPT

## 2022-07-03 PROCEDURE — 84145 PROCALCITONIN (PCT): CPT

## 2022-07-03 PROCEDURE — 2000000000 HC ICU R&B

## 2022-07-03 PROCEDURE — 6370000000 HC RX 637 (ALT 250 FOR IP): Performed by: PHYSICIAN ASSISTANT

## 2022-07-03 PROCEDURE — 87040 BLOOD CULTURE FOR BACTERIA: CPT

## 2022-07-03 PROCEDURE — 87635 SARS-COV-2 COVID-19 AMP PRB: CPT

## 2022-07-03 PROCEDURE — P9612 CATHETERIZE FOR URINE SPEC: HCPCS

## 2022-07-03 PROCEDURE — 6360000002 HC RX W HCPCS: Performed by: INTERNAL MEDICINE

## 2022-07-03 PROCEDURE — 84484 ASSAY OF TROPONIN QUANT: CPT

## 2022-07-03 PROCEDURE — 87077 CULTURE AEROBIC IDENTIFY: CPT

## 2022-07-03 PROCEDURE — 87186 SC STD MICRODIL/AGAR DIL: CPT

## 2022-07-03 PROCEDURE — 82803 BLOOD GASES ANY COMBINATION: CPT

## 2022-07-03 PROCEDURE — 2580000003 HC RX 258: Performed by: STUDENT IN AN ORGANIZED HEALTH CARE EDUCATION/TRAINING PROGRAM

## 2022-07-03 RX ORDER — SODIUM CHLORIDE 0.9 % (FLUSH) 0.9 %
5-40 SYRINGE (ML) INJECTION PRN
Status: DISCONTINUED | OUTPATIENT
Start: 2022-07-03 | End: 2022-07-13 | Stop reason: HOSPADM

## 2022-07-03 RX ORDER — ACETAMINOPHEN 650 MG/1
650 SUPPOSITORY RECTAL EVERY 6 HOURS PRN
Status: DISCONTINUED | OUTPATIENT
Start: 2022-07-03 | End: 2022-07-13 | Stop reason: HOSPADM

## 2022-07-03 RX ORDER — ONDANSETRON 4 MG/1
4 TABLET, ORALLY DISINTEGRATING ORAL EVERY 8 HOURS PRN
Status: DISCONTINUED | OUTPATIENT
Start: 2022-07-03 | End: 2022-07-13 | Stop reason: HOSPADM

## 2022-07-03 RX ORDER — GABAPENTIN 100 MG/1
100 CAPSULE ORAL 2 TIMES DAILY
Status: DISCONTINUED | OUTPATIENT
Start: 2022-07-03 | End: 2022-07-13 | Stop reason: HOSPADM

## 2022-07-03 RX ORDER — SODIUM CHLORIDE 9 MG/ML
INJECTION, SOLUTION INTRAVENOUS CONTINUOUS
Status: DISCONTINUED | OUTPATIENT
Start: 2022-07-03 | End: 2022-07-04

## 2022-07-03 RX ORDER — ACETAMINOPHEN 325 MG/1
650 TABLET ORAL ONCE
Status: COMPLETED | OUTPATIENT
Start: 2022-07-03 | End: 2022-07-03

## 2022-07-03 RX ORDER — IPRATROPIUM BROMIDE AND ALBUTEROL SULFATE 2.5; .5 MG/3ML; MG/3ML
1 SOLUTION RESPIRATORY (INHALATION)
Status: DISCONTINUED | OUTPATIENT
Start: 2022-07-03 | End: 2022-07-13 | Stop reason: HOSPADM

## 2022-07-03 RX ORDER — PANTOPRAZOLE SODIUM 40 MG/1
40 TABLET, DELAYED RELEASE ORAL DAILY
Status: DISCONTINUED | OUTPATIENT
Start: 2022-07-03 | End: 2022-07-13 | Stop reason: HOSPADM

## 2022-07-03 RX ORDER — ATORVASTATIN CALCIUM 40 MG/1
40 TABLET, FILM COATED ORAL DAILY
Status: DISCONTINUED | OUTPATIENT
Start: 2022-07-03 | End: 2022-07-13 | Stop reason: HOSPADM

## 2022-07-03 RX ORDER — 0.9 % SODIUM CHLORIDE 0.9 %
30 INTRAVENOUS SOLUTION INTRAVENOUS ONCE
Status: COMPLETED | OUTPATIENT
Start: 2022-07-03 | End: 2022-07-03

## 2022-07-03 RX ORDER — SODIUM CHLORIDE 9 MG/ML
INJECTION, SOLUTION INTRAVENOUS PRN
Status: DISCONTINUED | OUTPATIENT
Start: 2022-07-03 | End: 2022-07-13 | Stop reason: HOSPADM

## 2022-07-03 RX ORDER — INSULIN LISPRO 100 [IU]/ML
0-3 INJECTION, SOLUTION INTRAVENOUS; SUBCUTANEOUS NIGHTLY
Status: DISCONTINUED | OUTPATIENT
Start: 2022-07-03 | End: 2022-07-13 | Stop reason: HOSPADM

## 2022-07-03 RX ORDER — ALBUTEROL SULFATE 90 UG/1
2 AEROSOL, METERED RESPIRATORY (INHALATION) EVERY 6 HOURS PRN
Status: DISCONTINUED | OUTPATIENT
Start: 2022-07-03 | End: 2022-07-13 | Stop reason: HOSPADM

## 2022-07-03 RX ORDER — HEPARIN SODIUM 5000 [USP'U]/ML
5000 INJECTION, SOLUTION INTRAVENOUS; SUBCUTANEOUS EVERY 8 HOURS SCHEDULED
Status: CANCELLED | OUTPATIENT
Start: 2022-07-03

## 2022-07-03 RX ORDER — 0.9 % SODIUM CHLORIDE 0.9 %
1000 INTRAVENOUS SOLUTION INTRAVENOUS ONCE
Status: COMPLETED | OUTPATIENT
Start: 2022-07-03 | End: 2022-07-03

## 2022-07-03 RX ORDER — POLYETHYLENE GLYCOL 3350 17 G/17G
17 POWDER, FOR SOLUTION ORAL DAILY PRN
Status: DISCONTINUED | OUTPATIENT
Start: 2022-07-03 | End: 2022-07-13 | Stop reason: HOSPADM

## 2022-07-03 RX ORDER — ACETAMINOPHEN 325 MG/1
650 TABLET ORAL EVERY 6 HOURS PRN
Status: DISCONTINUED | OUTPATIENT
Start: 2022-07-03 | End: 2022-07-13 | Stop reason: HOSPADM

## 2022-07-03 RX ORDER — METHYLPREDNISOLONE SODIUM SUCCINATE 40 MG/ML
40 INJECTION, POWDER, LYOPHILIZED, FOR SOLUTION INTRAMUSCULAR; INTRAVENOUS EVERY 12 HOURS
Status: DISCONTINUED | OUTPATIENT
Start: 2022-07-03 | End: 2022-07-11

## 2022-07-03 RX ORDER — INSULIN LISPRO 100 [IU]/ML
0-6 INJECTION, SOLUTION INTRAVENOUS; SUBCUTANEOUS
Status: DISCONTINUED | OUTPATIENT
Start: 2022-07-04 | End: 2022-07-13 | Stop reason: HOSPADM

## 2022-07-03 RX ORDER — ONDANSETRON 2 MG/ML
4 INJECTION INTRAMUSCULAR; INTRAVENOUS EVERY 6 HOURS PRN
Status: DISCONTINUED | OUTPATIENT
Start: 2022-07-03 | End: 2022-07-13 | Stop reason: HOSPADM

## 2022-07-03 RX ORDER — SODIUM CHLORIDE 0.9 % (FLUSH) 0.9 %
5-40 SYRINGE (ML) INJECTION EVERY 12 HOURS SCHEDULED
Status: DISCONTINUED | OUTPATIENT
Start: 2022-07-03 | End: 2022-07-13 | Stop reason: HOSPADM

## 2022-07-03 RX ORDER — DEXTROSE MONOHYDRATE 50 MG/ML
100 INJECTION, SOLUTION INTRAVENOUS PRN
Status: DISCONTINUED | OUTPATIENT
Start: 2022-07-03 | End: 2022-07-13 | Stop reason: HOSPADM

## 2022-07-03 RX ADMIN — GABAPENTIN 100 MG: 100 CAPSULE ORAL at 20:20

## 2022-07-03 RX ADMIN — SODIUM CHLORIDE, PRESERVATIVE FREE 10 ML: 5 INJECTION INTRAVENOUS at 20:15

## 2022-07-03 RX ADMIN — IPRATROPIUM BROMIDE AND ALBUTEROL SULFATE 1 AMPULE: .5; 3 SOLUTION RESPIRATORY (INHALATION) at 19:27

## 2022-07-03 RX ADMIN — SODIUM CHLORIDE 1000 ML: 9 INJECTION, SOLUTION INTRAVENOUS at 18:21

## 2022-07-03 RX ADMIN — PANTOPRAZOLE SODIUM 40 MG: 40 TABLET, DELAYED RELEASE ORAL at 20:23

## 2022-07-03 RX ADMIN — AZITHROMYCIN MONOHYDRATE 500 MG: 500 INJECTION, POWDER, LYOPHILIZED, FOR SOLUTION INTRAVENOUS at 20:11

## 2022-07-03 RX ADMIN — SODIUM CHLORIDE: 9 INJECTION, SOLUTION INTRAVENOUS at 20:09

## 2022-07-03 RX ADMIN — VANCOMYCIN HYDROCHLORIDE 1000 MG: 1 INJECTION, POWDER, LYOPHILIZED, FOR SOLUTION INTRAVENOUS at 21:26

## 2022-07-03 RX ADMIN — METHYLPREDNISOLONE SODIUM SUCCINATE 40 MG: 40 INJECTION, POWDER, FOR SOLUTION INTRAMUSCULAR; INTRAVENOUS at 20:20

## 2022-07-03 RX ADMIN — CEFEPIME HYDROCHLORIDE 2000 MG: 2 INJECTION, POWDER, FOR SOLUTION INTRAVENOUS at 18:23

## 2022-07-03 RX ADMIN — SODIUM CHLORIDE: 9 INJECTION, SOLUTION INTRAVENOUS at 20:08

## 2022-07-03 RX ADMIN — ACETAMINOPHEN 650 MG: 325 TABLET ORAL at 16:51

## 2022-07-03 RX ADMIN — SODIUM CHLORIDE 1860 ML: 9 INJECTION, SOLUTION INTRAVENOUS at 15:33

## 2022-07-03 RX ADMIN — ATORVASTATIN CALCIUM 40 MG: 40 TABLET, FILM COATED ORAL at 20:23

## 2022-07-03 ASSESSMENT — PAIN DESCRIPTION - DESCRIPTORS
DESCRIPTORS: ACHING
DESCRIPTORS: ACHING

## 2022-07-03 ASSESSMENT — PAIN DESCRIPTION - LOCATION
LOCATION: BACK
LOCATION: BACK

## 2022-07-03 ASSESSMENT — PAIN SCALES - GENERAL
PAINLEVEL_OUTOF10: 0
PAINLEVEL_OUTOF10: 8
PAINLEVEL_OUTOF10: 0
PAINLEVEL_OUTOF10: 7

## 2022-07-03 ASSESSMENT — PAIN - FUNCTIONAL ASSESSMENT: PAIN_FUNCTIONAL_ASSESSMENT: NONE - DENIES PAIN

## 2022-07-03 NOTE — ED PROVIDER NOTES
Attending Supervisory Note/Shared Visit   I have personally performed a face to face diagnostic evaluation on this patient. I have reviewed the mid-levels findings and agree. History and Exam by me shows an alert white female in no acute distress. She has been sick for about a week. She has had cough and congestion. She has had decreased appetite. Her  said in the beginning she had some nausea and vomiting. She states the last 5 days she just really has not been eating much. EMS reported the patient was hypoxic with an oxygen saturation of 82%. General: Alert elderly female, ill-appearing, appears mildly dyspneic. HEENT: Conjunctiva are clear. Pupils equal round reactive. Oropharynx shows dry mucosa. Heart: Tachycardic, regular, no murmurs or gallops. Lungs: Breath sounds diffusely decreased on the right with rales and rhonchi. No wheezes. Abdomen: Soft, nondistended, nontender. Skin: Warm and dry, poor turgor. Neuro: Awake, alert, oriented. Lab reviewed. H&H of 14.6 and 42.4. White blood cell count 7500 with 66 neutrophils and 23 bands. Urine micro shows 107 hyaline cast, 8 epithelial cells. White blood cell count not resulted. Sodium 130 with a potassium of 3.7. Bicarb of 18 with anion gap of 20. BUN of 69 with a creatinine of 4.1. Glucose 127. Troponin 0.01. Venous blood gas shows a pH of 7.39 with a PCO2 of 35. Lactic acid of 2.0. Chest x-ray: Diffuse asymmetric right-sided airspace disease greatest in her perihilar region. May reflect pneumonia or asymmetric pulmonary edema. EKG: Sinus tachycardia, rate of 115, prolonged QT. Rhythm strip shows sinus tachycardia with a rate of 115, CO interval 124 ms, QRS 78 ms with no other ectopy as interpreted by me. This compared to an EKG dated 4/22/2022, the sinus tachycardia is new, the prolonged QT is new. Clinically patient is septic. Radiographically she has pneumonia. She is an ROLY.   Sepsis fluids were administered. Blood cultures were obtained. IV antibiotics were administered. Patient is currently on high flow nasal cannula at 10 L and is saturating in the high 90s. She is awake alert and oriented. She will require admission for further treatment. The hospitalist will be consulted for admission. Test results, diagnosis, and treatment plan were discussed the patient and her . They understand the treatment plan need for admission and are agreeable.       (Please note that portions of this note were completed with a voice recognition program.  Efforts were made to edit the dictations but occasionally words are mis-transcribed.)    Valentina Fowler MD  Attending Emergency Physician        Tay Schneider MD  07/03/22 [id number]

## 2022-07-03 NOTE — H&P
Hospital Medicine History & Physical      PCP: Alexa Casarez, APRN - CNP    Date of Admission: 7/3/2022    Date of Service: Pt seen/examined on 07/03/2022 and Admitted to Inpatient with expected LOS greater than two midnights due to medical therapy. Chief Complaint: Weakness, cough, shortness of breath      History Of Present Illness:      78 y.o. female who presented to [facility] rehab with 3 to 4 days history of worsening weakness, worsening cough productive and sometimes bloody, shortness of breath worsening with any ambulation, associated with generalized weakness, somnolence and decreased appetite, not associated with any chest pain, in the context of COPD and continued tobacco smoking, denies fever or chills, no viral EMS found her hypoxic saturation of 82, started on oxygen and transferred to emergency department, imaging positive for pneumonia, patient is wheezing in emergency department, stated that she does smoke but she did not smoke any cigarettes for the last couple days. Denies abdominal pain nausea or vomiting.   Denies headache or blurry vision    Past Medical History:          Diagnosis Date    Chronic pain syndrome     Colorectal polyps     COPD (chronic obstructive pulmonary disease) (HCC)     CTS (carpal tunnel syndrome)     BILATERAL    DDD (degenerative disc disease), lumbar     Depression     Family hx of colon cancer     Fibromyalgia     Hyperlipemia     IBS (irritable bowel syndrome)     Lumbar spondylosis     New onset type 2 diabetes mellitus ([facility] [address] 2/3/2020    Urticaria, chronic        Past Surgical History:          Procedure Laterality Date    CARPAL TUNNEL RELEASE Bilateral     CERVICAL POLYP REMOVAL  9/2004    INTRACAPSULAR CATARACT EXTRACTION Left 6/23/2020    Phacoemulsification with intraocular lens implant performed by Magalie Javier MD at [place] [address] Right 7/14/2020    Phacoemulsification with intraocular lens implant performed by Yovany Jackson MD at [place] [address]      patient denies        Medications Prior to Admission:      Prior to Admission medications    Medication Sig Start Date End Date Taking? Authorizing Provider   DULoxetine (CYMBALTA) 60 MG extended release capsule TAKE 1 CAPSULE BY MOUTH TWICE A DAY 6/14/22   HECTOR aSmson CNP   oxyCODONE-acetaminophen (PERCOCET) 7.5-325 MG per tablet Take 1 tablet by mouth every 6 hours as needed for Pain for up to 28 days. Max 4 a day 6/7/22 7/5/22  Nusrat Rivas MD   pantoprazole (PROTONIX) 40 MG tablet Take 1 tablet by mouth daily 6/7/22   Nusrat Rivas MD   atorvastatin (LIPITOR) 80 MG tablet TAKE 1 TABLET BY MOUTH EVERY DAY FOR CHOLESTEROL 6/3/22   HECTOR Samson CNP   tiZANidine (ZANAFLEX) 4 MG tablet Take 1 tablet by mouth nightly 5/10/22   Nusrat Rivas MD   meloxicam (MOBIC) 15 MG tablet Take 1 tablet by mouth daily 5/10/22   Nusrat Rivas MD   traZODone (DESYREL) 50 MG tablet Take 1-2 tablets by mouth nightly 4/12/22   Nusrat Rivas MD   gabapentin (NEURONTIN) 600 MG tablet Take 1 tablet by mouth 2 times daily for 30 days.  4/12/22 5/12/22  Nusrat Rivas MD   Fluticasone-Umeclidin-Vilant (TRELEGY ELLIPTA) 200-62.5-25 MCG/INH AEPB Inhale 1 Inhaler into the lungs daily 4/5/22   [facility]   magnesium oxide (MGO) 400 (241.3 Mg) MG TABS tablet Take 1 tablet by mouth 2 times daily as needed (for cramping) 3/15/22   Nusrat Rivas MD   alendronate (FOSAMAX) 70 MG tablet TAKE 1 TABLET BY MOUTH ONE TIME PER WEEK 3/8/22   HECTOR Samson CNP   albuterol sulfate  (90 Base) MCG/ACT inhaler Inhale 2 puffs into the lungs every 6 hours as needed for Shortness of Breath 12/29/21   Delon Barthel, MD   Calcium Citrate-Vitamin D 500-500 MG-UNIT CHEW Take 1 tablet by mouth 2 times daily 9/27/21   HECTOR Samson - CNP   Blood Glucose Monitoring Suppl (ONE TOUCH ULTRA MINI) w/Device KIT 1 kit by Does not apply route daily 2/7/20   HECTOR Driscoll CNP   ONE TOUCH LANCETS MISC 1 each by Does not apply route daily 2/7/20   HECTOR Driscoll CNP   blood glucose test strips (ASCENSIA AUTODISC VI;ONE TOUCH ULTRA TEST VI) strip 1 each by In Vitro route daily 2/7/20   HECTOR Driscoll CNP   ipratropium-albuterol (DUONEB) 0.5-2.5 (3) MG/3ML SOLN nebulizer solution Take 1 aerosol every 4 hours for 2 days and then as needed for shortness of breath coughing and wheezing 9/4/19   Ivan Cueva MD   Cholecalciferol (VITAMIN D) 2000 units TABS tablet Take 1 tablet by mouth daily 7/12/19   HECTOR Driscoll CNP   cetirizine (ZYRTEC) 10 MG tablet Take 10 mg by mouth daily as needed for Allergies    Historical Provider, MD   Nebulizers (COMPRESSOR/NEBULIZER) MISC 1 Units by Does not apply route 4 times daily 3/30/19   Prerna Barillas MD       Allergies:  Patient has no known allergies. Social History:      The patient currently lives at home    TOBACCO:   reports that she has been smoking cigarettes. She started smoking about 60 years ago. She has a 55.00 pack-year smoking history. She has never used smokeless tobacco.  ETOH:   reports no history of alcohol use. E-cigarette/Vaping     Questions Responses    E-cigarette/Vaping Use Never User    Start Date     Passive Exposure     Quit Date     Counseling Given Yes    Comments             Family History:      Reviewed and positive for        Problem Relation Age of Onset    Cancer Father         COLON     Alzheimer's Disease Mother     Heart Disease Brother     Heart Failure Brother     Diabetes Daughter     Diabetes Daughter     Diabetes Daughter        REVIEW OF SYSTEMS COMPLETED:   Pertinent positives as noted in the HPI. All other systems reviewed and negative.     PHYSICAL EXAM PERFORMED:    BP (!) 96/42   Pulse 99   Temp (!) 101 °F (38.3 °C) (Rectal) Comment: Simultaneous filing. User may not have seen previous data. Resp 24   Wt 136 lb 11 oz (62 kg)   SpO2 99%   BMI 23.46 kg/m²     General appearance: Mild distress  HEENT:  Normal cephalic  Neck: Supple  Respiratory: Expiratory wheezes  Cardiovascular:  Regular rate and rhythm with normal S1/S2 without murmurs, rubs or gallops. Abdomen: Soft, non-tender, non-distended   Musculoskeletal:  No clubbing, cyanosis  Skin: Skin color, texture, turgor normal.  No rashes or lesions. Neurologic: Moving all her extremities  Psychiatric:  Alert and oriented  Capillary Refill: Brisk,3 seconds, normal  Peripheral Pulses: +2 palpable, equal bilaterally       Labs:     Recent Labs     07/03/22  1511   WBC 7.5   HGB 14.6   HCT 42.4        Recent Labs     07/03/22  1510   *   K 3.7   CL 92*   CO2 18*   BUN 69*   CREATININE 4.1*   CALCIUM 8.7     No results for input(s): AST, ALT, BILIDIR, BILITOT, ALKPHOS in the last 72 hours. No results for input(s): INR in the last 72 hours. Recent Labs     07/03/22  1510   TROPONINI 0.01       Urinalysis:      Lab Results   Component Value Date/Time    NITRU Negative 07/03/2022 03:11 PM    WBCUA 3-5 07/03/2022 03:11 PM    BACTERIA 4+ 07/03/2022 03:11 PM    RBCUA 0-2 07/03/2022 03:11 PM    BLOODU Negative 07/03/2022 03:11 PM    SPECGRAV 1.022 07/03/2022 03:11 PM    GLUCOSEU Negative 07/03/2022 03:11 PM       Radiology:         XR CHEST PORTABLE   Final Result   Diffuse asymmetric right-sided airspace disease, greatest in the perihilar   region, which may reflect pneumonia or asymmetric pulmonary edema.              Consults:    IP CONSULT TO PULMONOLOGY  IP CONSULT TO PHARMACY    ASSESSMENT:    Active Hospital Problems    Diagnosis Date Noted    CAP (community acquired pneumonia) [J18.9] 07/03/2022     Priority: Medium    General weakness [R53.1] 07/03/2022     Priority: Medium    Elevated lactic acid level [R79.89] 07/03/2022     Priority: Medium    Diabetes ([facility] [E11.9] 02/03/2020    COPD exacerbation ([place] [address] [J44.1] 03/28/2019    Severe sepsis ([facility] [A41.9, R65.20] 01/10/2017    ROLY (acute kidney injury) ([facility] [N17.9] 01/10/2017         PLAN:    1. Severe sepsis, with low blood pressure, IV fluid bolus and maintenance, blood pressure still soft, might need pressors, patient will be admitted to intensive care unit, due to pneumonia, and COPD exacerbation, cefepime started along with azithromycin, repeat procalcitonin in a.m., CBC, CMP in a.m. neurology consulted plan of care discussed with patient in details, all questions answered, patient is at risk for life-threatening complications  2. Pneumonia, community-acquired pneumonia, currently on cefepime and azithromycin, urine for Legionella, strep antigen, nasal probe for MRSA at this time I cannot completely exclude MRSA pneumonia, will ask pharmacy to dose vancomycin for now pending MRSA probe  3. COPD exacerbation, IV steroids, DuoNeb, antibiotics as above  4. Generalized weakness, due to above expecting improvement  5. Diabetes mellitus, sliding scale  6. Acute kidney injury, likely prerenal due to the fact that patient is having very poor p.o. intake for the last few days, IV fluid, repeat renal function in a.m. if no improvement consider nephrology consult  7. Elevated lactic acid, improved we will trend further  8. Tobacco abuse, counseled for quitting  9.   Reported hemoptysis, likely due to cough, I will hold on heparin product for now monitor overnight currently for DVT we will use SCD      DVT Prophylaxis: SCD  Diet: No diet orders on file  Code Status: Prior    Critical care time including physical exam, ordering and following on critical labs, ordering critical IV medications in a patient with potential life-threatening complications and not including any procedure time 37 minutes    Dispo -ICU inpatient       Shola Delgadillo MD    Thank you Mac Corley APRN - FRED for the opportunity to be involved in this patient's care. If you have any questions or concerns please feel free to contact me at [phone].

## 2022-07-03 NOTE — RT PROTOCOL NOTE
RT Nebulizer Bronchodilator Protocol Note    There is a bronchodilator order in the chart from a provider indicating to follow the RT Bronchodilator Protocol and there is an Initiate RT Bronchodilator Protocol order as well (see protocol at bottom of note). CXR Findings:  XR CHEST PORTABLE    Result Date: 7/3/2022  Diffuse asymmetric right-sided airspace disease, greatest in the perihilar region, which may reflect pneumonia or asymmetric pulmonary edema. The findings from the last RT Protocol Assessment were as follows:  Smoking: Chronic pulmonary disease  Respiratory Pattern: Mild dyspnea at rest, irregular pattern, or RR 21-25 bpm  Breath Sounds: Inspiratory and expiratory or bilateral wheezing and/or rhonchi  Cough: Strong, productive  Indication for Bronchodilator Therapy: Wheezing associated with pulm disorder  Bronchodilator Assessment Score: 13    Aerosolized bronchodilator medication orders have been revised according to the RT Nebulizer Bronchodilator Protocol below. Respiratory Therapist to perform RT Therapy Protocol Assessment initially then follow the protocol. Repeat RT Therapy Protocol Assessment PRN for score 0-3 or on second treatment, BID, and PRN for scores above 3. No Indications - adjust the frequency to every 6 hours PRN wheezing or bronchospasm, if no treatments needed after 48 hours then discontinue using Per Protocol order mode. If indication present, adjust the RT bronchodilator orders based on the Bronchodilator Assessment Score as indicated below. If a patient is on this medication at home then do not decrease Frequency below that used at home. 0-3 - enter or revise RT bronchodilator order(s) to equivalent RT Bronchodilator order with Frequency of every 4 hours PRN for wheezing or increased work of breathing using Per Protocol order mode.        4-6 - enter or revise RT Bronchodilator order(s) to two equivalent RT bronchodilator orders with one order with BID Frequency and one order with Frequency of every 4 hours PRN wheezing or increased work of breathing using Per Protocol order mode. 7-10 - enter or revise RT Bronchodilator order(s) to two equivalent RT bronchodilator orders with one order with TID Frequency and one order with Frequency of every 4 hours PRN wheezing or increased work of breathing using Per Protocol order mode. 11-13 - enter or revise RT Bronchodilator order(s) to one equivalent RT bronchodilator order with QID Frequency and an Albuterol order with Frequency of every 4 hours PRN wheezing or increased work of breathing using Per Protocol order mode. Greater than 13 - enter or revise RT Bronchodilator order(s) to one equivalent RT bronchodilator order with every 4 hours Frequency and an Albuterol order with Frequency of every 2 hours PRN wheezing or increased work of breathing using Per Protocol order mode. RT to enter RT Home Evaluation for COPD & MDI Assessment order using Per Protocol order mode.     Electronically signed by Paulette Almendarez RCP on 7/3/2022 at 7:34 PM

## 2022-07-04 LAB
A/G RATIO: 0.6 (ref 1.1–2.2)
ALBUMIN SERPL-MCNC: 2.1 G/DL (ref 3.4–5)
ALP BLD-CCNC: 58 U/L (ref 40–129)
ALT SERPL-CCNC: 11 U/L (ref 10–40)
ANION GAP SERPL CALCULATED.3IONS-SCNC: 16 MMOL/L (ref 3–16)
ANION GAP SERPL CALCULATED.3IONS-SCNC: 17 MMOL/L (ref 3–16)
ANISOCYTOSIS: ABNORMAL
AST SERPL-CCNC: 18 U/L (ref 15–37)
BANDED NEUTROPHILS RELATIVE PERCENT: 46 % (ref 0–7)
BASOPHILS ABSOLUTE: 0 K/UL (ref 0–0.2)
BASOPHILS RELATIVE PERCENT: 0 %
BILIRUB SERPL-MCNC: 0.6 MG/DL (ref 0–1)
BUN BLDV-MCNC: 46 MG/DL (ref 7–20)
BUN BLDV-MCNC: 53 MG/DL (ref 7–20)
CALCIUM SERPL-MCNC: 7.8 MG/DL (ref 8.3–10.6)
CALCIUM SERPL-MCNC: 8 MG/DL (ref 8.3–10.6)
CHLORIDE BLD-SCNC: 104 MMOL/L (ref 99–110)
CHLORIDE BLD-SCNC: 104 MMOL/L (ref 99–110)
CO2: 16 MMOL/L (ref 21–32)
CO2: 17 MMOL/L (ref 21–32)
CREAT SERPL-MCNC: 1.5 MG/DL (ref 0.6–1.2)
CREAT SERPL-MCNC: 2.2 MG/DL (ref 0.6–1.2)
CRENATED RBC'S: ABNORMAL
EKG ATRIAL RATE: 115 BPM
EKG DIAGNOSIS: NORMAL
EKG P AXIS: 49 DEGREES
EKG P-R INTERVAL: 124 MS
EKG Q-T INTERVAL: 382 MS
EKG QRS DURATION: 78 MS
EKG QTC CALCULATION (BAZETT): 528 MS
EKG R AXIS: 59 DEGREES
EKG T AXIS: 49 DEGREES
EKG VENTRICULAR RATE: 115 BPM
EOSINOPHILS ABSOLUTE: 0.1 K/UL (ref 0–0.6)
EOSINOPHILS RELATIVE PERCENT: 1 %
ESTIMATED AVERAGE GLUCOSE: 125.5 MG/DL
GFR AFRICAN AMERICAN: 26
GFR AFRICAN AMERICAN: 41
GFR NON-AFRICAN AMERICAN: 22
GFR NON-AFRICAN AMERICAN: 34
GLUCOSE BLD-MCNC: 147 MG/DL (ref 70–99)
GLUCOSE BLD-MCNC: 173 MG/DL (ref 70–99)
GLUCOSE BLD-MCNC: 181 MG/DL (ref 70–99)
GLUCOSE BLD-MCNC: 189 MG/DL (ref 70–99)
GLUCOSE BLD-MCNC: 193 MG/DL (ref 70–99)
GLUCOSE BLD-MCNC: 216 MG/DL (ref 70–99)
HBA1C MFR BLD: 6 %
HCT VFR BLD CALC: 44.7 % (ref 36–48)
HEMOGLOBIN: 14.9 G/DL (ref 12–16)
L. PNEUMOPHILA SEROGP 1 UR AG: NORMAL
LACTIC ACID: 2.3 MMOL/L (ref 0.4–2)
LACTIC ACID: 2.5 MMOL/L (ref 0.4–2)
LACTIC ACID: 2.6 MMOL/L (ref 0.4–2)
LACTIC ACID: 3.6 MMOL/L (ref 0.4–2)
LYMPHOCYTES ABSOLUTE: 0.5 K/UL (ref 1–5.1)
LYMPHOCYTES RELATIVE PERCENT: 7 %
MAGNESIUM: 2.1 MG/DL (ref 1.8–2.4)
MCH RBC QN AUTO: 32.5 PG (ref 26–34)
MCHC RBC AUTO-ENTMCNC: 33.3 G/DL (ref 31–36)
MCV RBC AUTO: 97.7 FL (ref 80–100)
METAMYELOCYTES RELATIVE PERCENT: 5 %
MONOCYTES ABSOLUTE: 0.3 K/UL (ref 0–1.3)
MONOCYTES RELATIVE PERCENT: 4 %
MRSA SCREEN RT-PCR: NORMAL
NEUTROPHILS ABSOLUTE: 6.2 K/UL (ref 1.7–7.7)
NEUTROPHILS RELATIVE PERCENT: 37 %
PDW BLD-RTO: 14.2 % (ref 12.4–15.4)
PERFORMED ON: ABNORMAL
PLATELET # BLD: 273 K/UL (ref 135–450)
PLATELET SLIDE REVIEW: ADEQUATE
PMV BLD AUTO: 8 FL (ref 5–10.5)
POIKILOCYTES: ABNORMAL
POTASSIUM REFLEX MAGNESIUM: 3.5 MMOL/L (ref 3.5–5.1)
POTASSIUM SERPL-SCNC: 3.3 MMOL/L (ref 3.5–5.1)
PROCALCITONIN: 9.64 NG/ML (ref 0–0.15)
RBC # BLD: 4.57 M/UL (ref 4–5.2)
SLIDE REVIEW: ABNORMAL
SODIUM BLD-SCNC: 136 MMOL/L (ref 136–145)
SODIUM BLD-SCNC: 138 MMOL/L (ref 136–145)
STREP PNEUMONIAE ANTIGEN, URINE: NORMAL
TOTAL PROTEIN: 5.5 G/DL (ref 6.4–8.2)
VANCOMYCIN RANDOM: 16.4 UG/ML
WBC # BLD: 7 K/UL (ref 4–11)

## 2022-07-04 PROCEDURE — 94669 MECHANICAL CHEST WALL OSCILL: CPT

## 2022-07-04 PROCEDURE — 84145 PROCALCITONIN (PCT): CPT

## 2022-07-04 PROCEDURE — 6370000000 HC RX 637 (ALT 250 FOR IP): Performed by: INTERNAL MEDICINE

## 2022-07-04 PROCEDURE — 94761 N-INVAS EAR/PLS OXIMETRY MLT: CPT

## 2022-07-04 PROCEDURE — 80202 ASSAY OF VANCOMYCIN: CPT

## 2022-07-04 PROCEDURE — 2500000003 HC RX 250 WO HCPCS: Performed by: INTERNAL MEDICINE

## 2022-07-04 PROCEDURE — 2700000000 HC OXYGEN THERAPY PER DAY

## 2022-07-04 PROCEDURE — 85025 COMPLETE CBC W/AUTO DIFF WBC: CPT

## 2022-07-04 PROCEDURE — 2580000003 HC RX 258: Performed by: INTERNAL MEDICINE

## 2022-07-04 PROCEDURE — 99223 1ST HOSP IP/OBS HIGH 75: CPT | Performed by: INTERNAL MEDICINE

## 2022-07-04 PROCEDURE — 2000000000 HC ICU R&B

## 2022-07-04 PROCEDURE — 83735 ASSAY OF MAGNESIUM: CPT

## 2022-07-04 PROCEDURE — 93010 ELECTROCARDIOGRAM REPORT: CPT | Performed by: INTERNAL MEDICINE

## 2022-07-04 PROCEDURE — 80053 COMPREHEN METABOLIC PANEL: CPT

## 2022-07-04 PROCEDURE — 97162 PT EVAL MOD COMPLEX 30 MIN: CPT

## 2022-07-04 PROCEDURE — 6360000002 HC RX W HCPCS: Performed by: INTERNAL MEDICINE

## 2022-07-04 PROCEDURE — 36415 COLL VENOUS BLD VENIPUNCTURE: CPT

## 2022-07-04 PROCEDURE — 94640 AIRWAY INHALATION TREATMENT: CPT

## 2022-07-04 PROCEDURE — 83605 ASSAY OF LACTIC ACID: CPT

## 2022-07-04 PROCEDURE — 97530 THERAPEUTIC ACTIVITIES: CPT

## 2022-07-04 RX ORDER — HEPARIN SODIUM 5000 [USP'U]/ML
5000 INJECTION, SOLUTION INTRAVENOUS; SUBCUTANEOUS EVERY 8 HOURS SCHEDULED
Status: DISCONTINUED | OUTPATIENT
Start: 2022-07-04 | End: 2022-07-13 | Stop reason: HOSPADM

## 2022-07-04 RX ORDER — POTASSIUM CHLORIDE 20 MEQ/1
40 TABLET, EXTENDED RELEASE ORAL ONCE
Status: COMPLETED | OUTPATIENT
Start: 2022-07-04 | End: 2022-07-04

## 2022-07-04 RX ORDER — LACTOBACILLUS RHAMNOSUS GG 10B CELL
2 CAPSULE ORAL 2 TIMES DAILY
Status: DISCONTINUED | OUTPATIENT
Start: 2022-07-04 | End: 2022-07-13 | Stop reason: HOSPADM

## 2022-07-04 RX ORDER — TRAZODONE HYDROCHLORIDE 50 MG/1
25 TABLET ORAL NIGHTLY
Status: DISCONTINUED | OUTPATIENT
Start: 2022-07-04 | End: 2022-07-13 | Stop reason: HOSPADM

## 2022-07-04 RX ORDER — DULOXETIN HYDROCHLORIDE 60 MG/1
60 CAPSULE, DELAYED RELEASE ORAL DAILY
Status: DISCONTINUED | OUTPATIENT
Start: 2022-07-04 | End: 2022-07-13 | Stop reason: HOSPADM

## 2022-07-04 RX ORDER — OXYCODONE HYDROCHLORIDE AND ACETAMINOPHEN 5; 325 MG/1; MG/1
1 TABLET ORAL EVERY 6 HOURS PRN
Status: DISCONTINUED | OUTPATIENT
Start: 2022-07-04 | End: 2022-07-13 | Stop reason: HOSPADM

## 2022-07-04 RX ORDER — BUDESONIDE 0.5 MG/2ML
0.5 INHALANT ORAL 2 TIMES DAILY
Status: DISCONTINUED | OUTPATIENT
Start: 2022-07-04 | End: 2022-07-13

## 2022-07-04 RX ADMIN — DULOXETINE HYDROCHLORIDE 60 MG: 60 CAPSULE, DELAYED RELEASE ORAL at 16:14

## 2022-07-04 RX ADMIN — SODIUM CHLORIDE, PRESERVATIVE FREE 10 ML: 5 INJECTION INTRAVENOUS at 20:11

## 2022-07-04 RX ADMIN — AZITHROMYCIN MONOHYDRATE 500 MG: 500 INJECTION, POWDER, LYOPHILIZED, FOR SOLUTION INTRAVENOUS at 16:24

## 2022-07-04 RX ADMIN — PANTOPRAZOLE SODIUM 40 MG: 40 TABLET, DELAYED RELEASE ORAL at 07:50

## 2022-07-04 RX ADMIN — IPRATROPIUM BROMIDE AND ALBUTEROL SULFATE 1 AMPULE: .5; 3 SOLUTION RESPIRATORY (INHALATION) at 12:10

## 2022-07-04 RX ADMIN — INSULIN LISPRO 1 UNITS: 100 INJECTION, SOLUTION INTRAVENOUS; SUBCUTANEOUS at 20:13

## 2022-07-04 RX ADMIN — SODIUM CHLORIDE: 9 INJECTION, SOLUTION INTRAVENOUS at 05:19

## 2022-07-04 RX ADMIN — METHYLPREDNISOLONE SODIUM SUCCINATE 40 MG: 40 INJECTION, POWDER, FOR SOLUTION INTRAMUSCULAR; INTRAVENOUS at 20:10

## 2022-07-04 RX ADMIN — IPRATROPIUM BROMIDE AND ALBUTEROL SULFATE 1 AMPULE: .5; 3 SOLUTION RESPIRATORY (INHALATION) at 08:08

## 2022-07-04 RX ADMIN — TRAZODONE HYDROCHLORIDE 25 MG: 50 TABLET ORAL at 20:10

## 2022-07-04 RX ADMIN — Medication 2 CAPSULE: at 20:10

## 2022-07-04 RX ADMIN — GABAPENTIN 100 MG: 100 CAPSULE ORAL at 07:50

## 2022-07-04 RX ADMIN — Medication 2 CAPSULE: at 09:41

## 2022-07-04 RX ADMIN — INSULIN LISPRO 1 UNITS: 100 INJECTION, SOLUTION INTRAVENOUS; SUBCUTANEOUS at 16:05

## 2022-07-04 RX ADMIN — POTASSIUM CHLORIDE 40 MEQ: 20 TABLET, EXTENDED RELEASE ORAL at 16:14

## 2022-07-04 RX ADMIN — BUDESONIDE 500 MCG: 0.5 SUSPENSION RESPIRATORY (INHALATION) at 08:08

## 2022-07-04 RX ADMIN — HEPARIN SODIUM 5000 UNITS: 5000 INJECTION INTRAVENOUS; SUBCUTANEOUS at 13:46

## 2022-07-04 RX ADMIN — SODIUM BICARBONATE: 84 INJECTION, SOLUTION INTRAVENOUS at 09:36

## 2022-07-04 RX ADMIN — HEPARIN SODIUM 5000 UNITS: 5000 INJECTION INTRAVENOUS; SUBCUTANEOUS at 21:17

## 2022-07-04 RX ADMIN — OXYCODONE AND ACETAMINOPHEN 1 TABLET: 5; 325 TABLET ORAL at 22:18

## 2022-07-04 RX ADMIN — IPRATROPIUM BROMIDE AND ALBUTEROL SULFATE 1 AMPULE: .5; 3 SOLUTION RESPIRATORY (INHALATION) at 16:28

## 2022-07-04 RX ADMIN — INSULIN LISPRO 2 UNITS: 100 INJECTION, SOLUTION INTRAVENOUS; SUBCUTANEOUS at 11:54

## 2022-07-04 RX ADMIN — BUDESONIDE 500 MCG: 0.5 SUSPENSION RESPIRATORY (INHALATION) at 19:59

## 2022-07-04 RX ADMIN — METHYLPREDNISOLONE SODIUM SUCCINATE 40 MG: 40 INJECTION, POWDER, FOR SOLUTION INTRAMUSCULAR; INTRAVENOUS at 07:50

## 2022-07-04 RX ADMIN — ACETAMINOPHEN 650 MG: 325 TABLET ORAL at 20:10

## 2022-07-04 RX ADMIN — SODIUM BICARBONATE: 84 INJECTION, SOLUTION INTRAVENOUS at 22:07

## 2022-07-04 RX ADMIN — GABAPENTIN 100 MG: 100 CAPSULE ORAL at 20:10

## 2022-07-04 RX ADMIN — ATORVASTATIN CALCIUM 40 MG: 40 TABLET, FILM COATED ORAL at 07:50

## 2022-07-04 RX ADMIN — CEFEPIME HYDROCHLORIDE 1000 MG: 1 INJECTION, POWDER, FOR SOLUTION INTRAMUSCULAR; INTRAVENOUS at 13:45

## 2022-07-04 RX ADMIN — INSULIN LISPRO 1 UNITS: 100 INJECTION, SOLUTION INTRAVENOUS; SUBCUTANEOUS at 07:50

## 2022-07-04 RX ADMIN — OXYCODONE AND ACETAMINOPHEN 1 TABLET: 5; 325 TABLET ORAL at 16:15

## 2022-07-04 RX ADMIN — IPRATROPIUM BROMIDE AND ALBUTEROL SULFATE 1 AMPULE: .5; 3 SOLUTION RESPIRATORY (INHALATION) at 19:59

## 2022-07-04 ASSESSMENT — PAIN DESCRIPTION - LOCATION
LOCATION: BACK

## 2022-07-04 ASSESSMENT — PAIN DESCRIPTION - PAIN TYPE
TYPE: CHRONIC PAIN
TYPE: CHRONIC PAIN

## 2022-07-04 ASSESSMENT — PAIN SCALES - GENERAL
PAINLEVEL_OUTOF10: 7
PAINLEVEL_OUTOF10: 0
PAINLEVEL_OUTOF10: 0
PAINLEVEL_OUTOF10: 5
PAINLEVEL_OUTOF10: 7
PAINLEVEL_OUTOF10: 0
PAINLEVEL_OUTOF10: 7
PAINLEVEL_OUTOF10: 0
PAINLEVEL_OUTOF10: 9

## 2022-07-04 ASSESSMENT — PAIN DESCRIPTION - ONSET: ONSET: ON-GOING

## 2022-07-04 ASSESSMENT — PAIN DESCRIPTION - DESCRIPTORS
DESCRIPTORS: DULL;ACHING
DESCRIPTORS: ACHING;DULL

## 2022-07-04 ASSESSMENT — PAIN - FUNCTIONAL ASSESSMENT: PAIN_FUNCTIONAL_ASSESSMENT: PREVENTS OR INTERFERES SOME ACTIVE ACTIVITIES AND ADLS

## 2022-07-04 ASSESSMENT — PAIN DESCRIPTION - FREQUENCY: FREQUENCY: CONTINUOUS

## 2022-07-04 NOTE — PROGRESS NOTES
Clinical Pharmacy Note  Renal Dose Adjustment    Harley Hasy is receiving cefepime. This medication is renally eliminated. Based on the patient's estimated creatinine clearance: 19 mL/min (A) (based on SCr of 2.2 mg/dL) and urine output, the dose has been adjusted to cefepime 1 g IV every 12 hours per protocol. Pharmacy will continue to monitor and adjust dose as needed for changes in renal function.       [id number] [facility] Debbie Fletcher  7/4/2022 1:35 PM

## 2022-07-04 NOTE — FLOWSHEET NOTE
[employer] message sent to Dr Lucero Avila regarding patient asking for home dose of Percocet for chronic back pain. Received call from MD, home meds reviewed, OK to restart Percocet, Cymbalta, and Trazadone. Orders placed.

## 2022-07-04 NOTE — ED PROVIDER NOTES
[address] [facility]        Pt Name: Aurora Mc  MRN: [id number]  [place] 1945  Date of evaluation: 7/3/2022  Provider: ORILN Mcgill  PCP: HECTOR Mccormack CNP  Note Started: 9:40 PM EDT     This patient was also seen and evaluated by Attending Physician Anastasia Norton MD.    [address]       Chief Complaint   Patient presents with    Altered Mental Status     sick for 5 days       HISTORY OF PRESENT ILLNESS   (Location, Timing/Onset, Context/Setting, Quality, Duration, Modifying Factors, Severity, Associated Signs and Symptoms)  Note limiting factors. Chief Complaint: Shortness of breath, subjective fever, feeling generally ill, cough    Aurora Mc is a 78 y.o. female who presents with the above complaints. She comes from home, has COPD history, is a cigarette smoker. She says been feeling sick for about 5 days but does not like to go to the hospital, and failure members called for EMS. EMS reports that the patient was found to 82% on room air, and she does not use any supplemental oxygen at home. They placed her on oxygen by nasal cannula, and she arrived in the ED at about 87% on 5 L. Oxygen was increased to about 10 L and her SPO2 improved. She says she has not been eating much over the last 5 days, has been having some difficulty breathing, has been coughing, but mostly just she feels terrible. Reports past history of COVID-19 vaccination and no prior infection. Denies any urinary complaints. Denies vomiting or diarrhea but says she has not been eating much. Denies chest pain. Nursing Notes were all reviewed and agreed with or any disagreements were addressed in the HPI. REVIEW OF SYSTEMS    (2-9 systems for level 4, 10 or more for level 5)     Review of Systems    Positives and pertinent negatives as per HPI.      PAST MEDICAL HISTORY     Past Medical History:   Diagnosis Date    Chronic pain syndrome     Colorectal polyps     COPD (chronic obstructive pulmonary disease) (HCC)     CTS (carpal tunnel syndrome)     BILATERAL    DDD (degenerative disc disease), lumbar     Depression     Family hx of colon cancer     Fibromyalgia     Hyperlipemia     IBS (irritable bowel syndrome)     Lumbar spondylosis     New onset type 2 diabetes mellitus ([facility] [address] 2/3/2020    Urticaria, chronic        SURGICAL HISTORY     Past Surgical History:   Procedure Laterality Date    CARPAL TUNNEL RELEASE Bilateral     CERVICAL POLYP REMOVAL  9/2004    INTRACAPSULAR CATARACT EXTRACTION Left 6/23/2020    Phacoemuls[facility] with intraocular lens [facility] [address] [facility] 7/14/2020    Phacoemulsification with intraocular lens implant performed by Isidro Adler MD at [place] [address]      patient denies        [address]       Current Discharge Medication List      CONTINUE these medications which have NOT CHANGED    Details   DULoxetine (CYMBALTA) 60 MG extended release capsule TAKE 1 CAPSULE BY MOUTH TWICE A DAY  Qty: 60 capsule, Refills: 2    Associated Diagnoses: Chronic pain syndrome; Moderate episode of recurrent major depressive disorder (HCC)      oxyCODONE-acetaminophen (PERCOCET) 7.5-325 MG per tablet Take 1 tablet by mouth every 6 hours as needed for Pain for up to 28 days. Max 4 a day  Qty: 112 tablet, Refills: 0    Comments: Reduce doses taken as pain becomes manageable  Associated Diagnoses: Chronic pain syndrome; DDD (degenerative disc disease), lumbar; Osteoarthritis of spine with radiculopathy, lumbar region; Trochanteric bursitis of right hip; Fibromyalgia; Lumbar spondylosis;  Degeneration of lumbar or lumbosacral intervertebral disc      pantoprazole (PROTONIX) 40 MG tablet Take 1 tablet by mouth daily  Qty: 30 tablet, Refills: 1    Associated Diagnoses: Chronic pain syndrome      atorvastatin (LIPITOR) 80 MG tablet TAKE 1 TABLET BY MOUTH EVERY DAY FOR CHOLESTEROL  Qty: 90 tablet, Refills: 1    Associated Diagnoses: Hypercholesteremia      tiZANidine (ZANAFLEX) 4 MG tablet Take 1 tablet by mouth nightly  Qty: 30 tablet, Refills: 1    Associated Diagnoses: Chronic pain syndrome; Fibromyalgia; DDD (degenerative disc disease), lumbar      meloxicam (MOBIC) 15 MG tablet Take 1 tablet by mouth daily  Qty: 30 tablet, Refills: 1    Associated Diagnoses: Chronic pain syndrome; Fibromyalgia; DDD (degenerative disc disease), lumbar; Lumbar spondylosis; Osteoarthritis of spine with radiculopathy, lumbar region; Degeneration of lumbar or lumbosacral intervertebral disc; Trochanteric bursitis of right hip      traZODone (DESYREL) 50 MG tablet Take 1-2 tablets by mouth nightly  Qty: 60 tablet, Refills: 1    Associated Diagnoses: Chronic pain syndrome      gabapentin (NEURONTIN) 600 MG tablet Take 1 tablet by mouth 2 times daily for 30 days. Qty: 60 tablet, Refills: 1    Associated Diagnoses: Chronic pain syndrome; Degeneration of lumbar or lumbosacral intervertebral disc; Fibromyalgia; Lumbar spondylosis;  Trochanteric bursitis of right hip; DDD (degenerative disc disease), lumbar; Osteoarthritis of spine with radiculopathy, lumbar region      Fluticasone-Umeclidin-Vilant (TRELEGY ELLIPTA) 200-62.5-25 MCG/INH AEPB Inhale 1 Inhaler into the lungs daily  Qty: 1 each, Refills: 5    Associated Diagnoses: COPD, severe (HCC)      magnesium oxide (MGO) 400 (241.3 Mg) MG TABS tablet Take 1 tablet by mouth 2 times daily as needed (for cramping)  Qty: 60 tablet, Refills: 0    Associated Diagnoses: Chronic pain syndrome; Muscle cramping      alendronate (FOSAMAX) 70 MG tablet TAKE 1 TABLET BY MOUTH ONE TIME PER WEEK  Qty: 12 tablet, Refills: 1    Associated Diagnoses: Age-related osteoporosis without current pathological fracture      albuterol sulfate  (90 Base) MCG/ACT inhaler Inhale 2 puffs into the lungs every 6 hours as needed for Shortness of Breath  Qty: 1 each, Refills: 11    Associated Diagnoses: COPD, severe (HCC)      Calcium Citrate-Vitamin D 500-500 MG-UNIT CHEW Take 1 tablet by mouth 2 times daily    Associated Diagnoses: Age-related osteoporosis without current pathological fracture      Blood Glucose Monitoring Suppl (ONE TOUCH ULTRA MINI) w/Device KIT 1 kit by Does not apply route daily  Qty: 1 kit, Refills: 0    Associated Diagnoses: New onset type 2 diabetes mellitus (HCC)      ONE TOUCH LANCETS MISC 1 each by Does not apply route daily  Qty: 100 each, Refills: 3    Associated Diagnoses: New onset type 2 diabetes mellitus (HCC)      blood glucose test strips (ASCENSIA AUTODISC VI;ONE TOUCH ULTRA TEST VI) strip 1 each by In Vitro route daily  Qty: 100 each, Refills: 3    Associated Diagnoses: New onset type 2 diabetes mellitus (HCC)      ipratropium-albuterol (DUONEB) 0.5-2.5 (3) MG/3ML SOLN nebulizer solution Take 1 aerosol every 4 hours for 2 days and then as needed for shortness of breath coughing and wheezing  Qty: 360 mL, Refills: 3      Cholecalciferol (VITAMIN D) 2000 units TABS tablet Take 1 tablet by mouth daily  Qty: 30 tablet      cetirizine (ZYRTEC) 10 MG tablet Take 10 mg by mouth daily as needed for Allergies      Nebulizers (COMPRESSOR/NEBULIZER) MISC 1 Units by Does not apply route 4 times daily  Qty: 1 each, Refills: 3             ALLERGIES     Patient has no known allergies.     FAMILYHISTORY       Family History   Problem Relation Age of Onset    Cancer Father         COLON     Alzheimer's Disease Mother     Heart Disease Brother     Heart Failure Brother     Diabetes Daughter     Diabetes Daughter     Diabetes Daughter         SOCIAL HISTORY       Social History     Tobacco Use    Smoking status: Current Every Day Smoker     Packs/day: 1.00     Years: 55.00     Pack years: 55.00     Types: Cigarettes     Start date: 1/1/1962    Smokeless tobacco: Never Used Chloride 92 (*)     CO2 18 (*)     Anion Gap 20 (*)     Glucose 127 (*)     BUN 69 (*)     CREATININE 4.1 (*)     GFR Non- 11 (*)     GFR  13 (*)     All other components within normal limits   CBC WITH AUTO DIFFERENTIAL - Abnormal; Notable for the following components:    Lymphocytes Absolute 0.1 (*)     Bands Relative 23 (*)     Metamyelocytes Relative 6 (*)     Dohle Bodies Present (*)     All other components within normal limits   BRAIN NATRIURETIC PEPTIDE - Abnormal; Notable for the following components:    Pro-BNP 1,850 (*)     All other components within normal limits   BLOOD GAS, VENOUS - Abnormal; Notable for the following components:    pCO2, Daniel 35.1 (*)     HCO3, Venous 21 (*)     All other components within normal limits   URINALYSIS WITH REFLEX TO CULTURE - Abnormal; Notable for the following components:    Color, UA DARK YELLOW (*)     Clarity, UA TURBID (*)     Bilirubin Urine MODERATE (*)     Ketones, Urine TRACE (*)     Protein,  (*)     Leukocyte Esterase, Urine TRACE (*)     All other components within normal limits   LACTATE, SEPSIS - Abnormal; Notable for the following components:    Lactic Acid, Sepsis 2.0 (*)     All other components within normal limits   MICROSCOPIC URINALYSIS - Abnormal; Notable for the following components:    Coarse Casts, UA >40 (*)     Cellular Cast, UA 1-3 WBC (*)     Mucus, UA 3+ (*)     Bacteria, UA 4+ (*)     Crystals, UA Few Ca.  Oxalate (*)     Hyaline Casts,  (*)     Epithelial Cells, UA 8 (*)     All other components within normal limits   PROCALCITONIN - Abnormal; Notable for the following components:    Procalcitonin 11.67 (*)     All other components within normal limits   LACTIC ACID - Abnormal; Notable for the following components:    Lactic Acid 2.3 (*)     All other components within normal limits   POCT GLUCOSE - Abnormal; Notable for the following components:    POC Glucose 128 (*)     All other components administration in time range)   0.9 % sodium chloride infusion (has no administration in time range)   ondansetron (ZOFRAN-ODT) disintegrating tablet 4 mg (has no administration in time range)     Or   ondansetron (ZOFRAN) injection 4 mg (has no administration in time range)   polyethylene glycol (GLYCOLAX) packet 17 g (has no administration in time range)   acetaminophen (TYLENOL) tablet 650 mg (has no administration in time range)     Or   acetaminophen (TYLENOL) suppository 650 mg (has no administration in time range)   methylPREDNISolone sodium (SOLU-MEDROL) injection 40 mg (40 mg IntraVENous Given 7/3/22 2020)   ipratropium-albuterol (DUONEB) nebulizer solution 1 ampule (1 ampule Inhalation Given 7/3/22 1927)   glucose chewable tablet 16 g (has no administration in time range)   dextrose bolus 10% 125 mL (has no administration in time range)     Or   dextrose bolus 10% 250 mL (has no administration in time range)   glucagon (rDNA) injection 1 mg (has no administration in time range)   dextrose 5 % solution (has no administration in time range)   insulin lispro (HUMALOG) injection vial 0-6 Units (has no administration in time range)   insulin lispro (HUMALOG) injection vial 0-3 Units (0 Units SubCUTAneous Not Given 7/3/22 2014)   azithromycin (ZITHROMAX) 500 mg in D5W 250ml addavial (has no administration in time range)   albuterol sulfate HFA (PROVENTIL;VENTOLIN;PROAIR) 108 (90 Base) MCG/ACT inhaler 2 puff (has no administration in time range)   atorvastatin (LIPITOR) tablet 40 mg (40 mg Oral Given 7/3/22 2023)   gabapentin (NEURONTIN) capsule 100 mg (100 mg Oral Given 7/3/22 2020)   pantoprazole (PROTONIX) tablet 40 mg (40 mg Oral Given 7/3/22 2023)   0.9 % sodium chloride infusion ( IntraVENous New Bag 7/3/22 2008)   0.9 % sodium chloride infusion ( IntraVENous New Bag 7/3/22 2009)   vancomycin 1000 mg IVPB in 250 mL D5W addavial (1,000 mg IntraVENous New Bag 7/3/22 2126)   vancomycin (VANCOCIN) intermittent dosing (placeholder) (has no administration in time range)   cefepime (MAXIPIME) 1000 mg IVPB minibag (has no administration in time range)   0.9 % sodium chloride bolus (0 mL/kg × 62 kg IntraVENous Stopped 7/3/22 1732)   acetaminophen (TYLENOL) tablet 650 mg (650 mg Oral Given 7/3/22 1651)   cefepime (MAXIPIME) 2000 mg IVPB minibag (0 mg IntraVENous Stopped 7/3/22 1855)   0.9 % sodium chloride bolus (0 mLs IntraVENous Stopped 7/3/22 1928)           Is this patient to be included in the SEP-1 Core Measure due to severe sepsis or septic shock? Yes   SEP-1 CORE MEASURE DATA      Sepsis Criteria   Severe Sepsis Criteria   Septic Shock Criteria     Must be confirmed or suspected to move forward with diagnosis of sepsis. Must meet 2:    [] Temperature > 100.9 F (38.3 C)        or < 96.8 F (36 C)  [] HR > 90  [] RR > 20  [] WBC > 12 or < 4 or 10% bands      AND:      [] Infection Confirmed or        Suspected. Must meet 1:    [] Lactate > 2       or   [] Signs of Organ Dysfunction:    - SBP < 90 or MAP < 65  - Altered mental status  - Creatinine > 2 or increased from      baseline  - Urine Output < 0.5 ml/kg/hr  - Bilirubin > 2  - INR > 1.5 (not anticoagulated)  - Platelets < 037,052  - Acute Respiratory Failure as     evidenced by new need for NIPPV     or mechanical ventilation      [] No criteria met for Severe Sepsis. Must meet 1:    [] Lactate > 4        or   [] SBP < 90 or MAP < 65 for at        least two readings in the first        hour after fluid bolus        administration      [] Vasopressors initiated (if hypotension persists after fluid resuscitation)        [] No criteria met for Septic Shock.    Patient Vitals for the past 6 hrs:   BP Temp Pulse Resp SpO2 Height Weight Weight Method Percent Weight Change   07/03/22 1545 98/65 -- (!) 105 24 98 % -- -- -- --   07/03/22 1630 (!) 113/44 (!) 101 °F (38.3 °C) 95 (!) 31 100 % -- -- -- --   07/03/22 1645 (!) 101/52 -- 95 17 97 % -- -- -- --   07/03/22 1700 99/78 -- 99 26 98 % -- -- -- --   07/03/22 1715 (!) 96/42 -- 99 24 99 % -- -- -- --   07/03/22 1730 (!) 96/45 -- 100 28 99 % -- -- -- --   07/03/22 1745 (!) 90/46 -- 98 23 94 % -- -- -- --   07/03/22 1800 (!) 88/46 -- 94 23 95 % -- -- -- --   07/03/22 1815 (!) 86/44 -- 91 24 95 % -- -- -- --   07/03/22 1845 (!) 100/47 -- 91 17 96 % -- -- -- --   07/03/22 1915 118/64 98.1 °F (36.7 °C) 93 23 92 % 5' 4\" (1.626 m) 134 lb 7.7 oz (61 kg) Bedside scale 0   07/03/22 1927 -- -- 95 18 95 % -- -- -- --   07/03/22 2000 105/61 -- 90 26 96 % -- -- -- --   07/03/22 2100 120/63 -- 98 22 97 % -- -- -- --      Recent Labs     07/03/22  1510 07/03/22  1511 07/03/22 2026   WBC  --  7.5  --    LACTA  --   --  2.3*   CREATININE 4.1*  --   --    PLT  --  278  --          Time Severe Sepsis Identified: 15:15    Fluid Resuscitation Rational: at least 30mL/kg based on entered actual weight at time of triage    Repeat lactate level: improving    Reassessment Exam:   Not applicable. Patient does not have septic shock. Patient initially required 10 L of nasal cannula oxygen to maintain decent oxygenation, but this improved and her mental oxygen was reduced in the ED. She arrived febrile and tachycardic, but systolic blood pressure was mostly above 100, no lower than 95. She was given 30 mL/kg of IV fluids. Chest x-ray revealed pneumonia in the right lung. Lab work-up was notable for a significant ROLY with a creatinine of 4.1, and she has no chronic kidney disease and CR is normal at baseline. Sodium slightly low at 130. Anion gap 20. Lactate slightly elevated, normal serum white blood cell count. She was given IV antibiotics in the ED. She is also noted to have a UTI. I consulted the hospitalist, who agreed to accept and admit the patient to ICU. He noted that the patient's systolic blood pressure had fallen to 80, and more IV fluid was initiated. Patient verbalized agreement with this plan of care.     CRITICAL CARE TIME CRITICAL CARE: There was a high probability of clinically significant and/or life threatening deterioration in this patient's condition which required my urgent intervention. I independently provided 15 minutes of non-concurrent critical care out of the total shared critical care time provided. This excludes any time for separately reportable procedures. FINAL IMPRESSION      1. Community acquired pneumonia of right lung, unspecified part of lung    2. Urinary tract infection in female    3. Sepsis with acute renal failure without septic shock, due to unspecified organism, unspecified acute renal failure type ([facility]    4. Hypoxia    5. Chronic obstructive pulmonary disease, unspecified COPD type ([facility]          DISPOSITION/PLAN   DISPOSITION Admitted 07/03/2022 06:09:42 PM      PATIENT REFERRED TO:  No follow-up provider specified.     DISCHARGE MEDICATIONS:  Current Discharge Medication List          DISCONTINUED MEDICATIONS:  Current Discharge Medication List               (Please note that portions of this note were completed with a voice recognition program.  Efforts were made to edit the dictations but occasionally words are mis-transcribed.)    ORLIN Pink (electronically signed)       Leyda Pinkma  07/03/22 6067

## 2022-07-04 NOTE — PLAN OF CARE
Problem: Discharge Planning  Goal: Discharge to home or other facility with appropriate resources  Outcome: Progressing  Flowsheets  Taken 7/3/2022 2000  Discharge to home or other facility with appropriate resources:   Identify barriers to discharge with patient and caregiver   Arrange for needed discharge resources and transportation as appropriate   Identify discharge learning needs (meds, wound care, etc)  Taken 7/3/2022 1945  Discharge to home or other facility with appropriate resources:   Identify barriers to discharge with patient and caregiver   Arrange for needed discharge resources and transportation as appropriate   Identify discharge learning needs (meds, wound care, etc)     Problem: Pain  Goal: Verbalizes/displays adequate comfort level or baseline comfort level  Outcome: Progressing     Problem: Skin/Tissue Integrity  Goal: Absence of new skin breakdown  Description: 1. Monitor for areas of redness and/or skin breakdown  2. Assess vascular access sites hourly  3. Every 4-6 hours minimum:  Change oxygen saturation probe site  4. Every 4-6 hours:  If on nasal continuous positive airway pressure, respiratory therapy assess nares and determine need for appliance change or resting period.   Outcome: Progressing     Problem: Safety - Adult  Goal: Free from fall injury  Outcome: Progressing     Problem: ABCDS Injury Assessment  Goal: Absence of physical injury  Outcome: Progressing

## 2022-07-04 NOTE — CONSULTS
Clinical Pharmacy Note  Vancomycin Consult    Genesis Grant is a 78 y.o. female ordered Vancomycin for PNA (CAP); consult received from Dr. Tonya Torres to manage therapy. Also receiving Cefepime, Zithromax. .    Allergies:  Patient has no known allergies. Temp max:  Temp (24hrs), Av.7 °F (37.6 °C), Min:98.1 °F (36.7 °C), Max:101 °F (38.3 °C)      Recent Labs     22  1511   WBC 7.5       Recent Labs     22  1510   BUN 69*   CREATININE 4.1*         Intake/Output Summary (Last 24 hours) at 7/3/2022 2055  Last data filed at 7/3/2022 6628  Gross per 24 hour   Intake 1909.99 ml   Output --   Net 1909.99 ml       Culture Results:      Ht Readings from Last 1 Encounters:   22 5' 4\" (1.626 m)        Wt Readings from Last 1 Encounters:   22 134 lb 7.7 oz (61 kg)         Estimated Creatinine Clearance: 10 mL/min (A) (based on SCr of 4.1 mg/dL (H)). Assessment/Plan:  Vancomycin 1000 mg IVPB times one dose. Goal trough level of 15-20 mg/L. Vancomycin random level at 0600 22. Thank you for the consult.      [facility], PRS 7/3/2022  8:59 PM

## 2022-07-04 NOTE — PROGRESS NOTES
Hospitalist Progress Note      PCP: Brynn Shaw, APRN - CNP    Date of Admission: 7/3/2022        Subjective: Chair, feels okay but still tired, denies fever or chills      Medications:  Reviewed    Infusion Medications    sodium bicarbonate in sterile water infusion      sodium chloride      dextrose       Scheduled Medications    budesonide  0.5 mg Nebulization BID    lactobacillus  2 capsule Oral BID    sodium chloride flush  5-40 mL IntraVENous 2 times per day    methylPREDNISolone  40 mg IntraVENous Q12H    ipratropium-albuterol  1 ampule Inhalation Q4H WA    insulin lispro  0-6 Units SubCUTAneous TID WC    insulin lispro  0-3 Units SubCUTAneous Nightly    azithromycin  500 mg IntraVENous Q24H    atorvastatin  40 mg Oral Daily    gabapentin  100 mg Oral BID    pantoprazole  40 mg Oral Daily    cefepime  1,000 mg IntraVENous Q24H     PRN Meds: sodium chloride flush, sodium chloride, ondansetron **OR** ondansetron, polyethylene glycol, acetaminophen **OR** acetaminophen, glucose, dextrose bolus **OR** dextrose bolus, glucagon (rDNA), dextrose, albuterol sulfate HFA      Intake/Output Summary (Last 24 hours) at 7/4/2022 0840  Last data filed at 7/4/2022 0529  Gross per 24 hour   Intake 3980.63 ml   Output 750 ml   Net 3230.63 ml       Physical Exam Performed:    /76   Pulse (!) 110   Temp 98.4 °F (36.9 °C) (Oral)   Resp 30   Ht 5' 4\" (1.626 m)   Wt 134 lb 14.7 oz (61.2 kg)   SpO2 94%   BMI 23.16 kg/m²     General appearance: Fatigue  Neck: Supple  Respiratory: Rhonchi, scattered expiratory wheezes  Cardiovascular: Regular rate and rhythm with normal S1/S2 without murmurs, rubs or gallops. Abdomen: Soft, non-tender  Musculoskeletal: No clubbing, cyanosis   Skin: Skin color, texture, turgor normal.  No rashes or lesions.   Neurologic: No focal weakness  Psychiatric: Alert and oriented  Capillary Refill: Brisk,3 seconds, normal   Peripheral Pulses: +2 palpable, equal bilaterally       Labs:   Recent Labs     07/03/22  1511 07/04/22  0419   WBC 7.5 7.0   HGB 14.6 14.9   HCT 42.4 44.7    273     Recent Labs     07/03/22  1510 07/04/22  0419   * 136   K 3.7 3.5   CL 92* 104   CO2 18* 16*   BUN 69* 53*   CREATININE 4.1* 2.2*   CALCIUM 8.7 8.0*     Recent Labs     07/04/22  0419   AST 18   ALT 11   BILITOT 0.6   ALKPHOS 58     No results for input(s): INR in the last 72 hours. Recent Labs     07/03/22  1510   TROPONINI 0.01       Urinalysis:      Lab Results   Component Value Date/Time    NITRU Negative 07/03/2022 03:11 PM    WBCUA 3-5 07/03/2022 03:11 PM    BACTERIA 4+ 07/03/2022 03:11 PM    RBCUA 0-2 07/03/2022 03:11 PM    BLOODU Negative 07/03/2022 03:11 PM    SPECGRAV 1.022 07/03/2022 03:11 PM    GLUCOSEU Negative 07/03/2022 03:11 PM       Radiology:  XR CHEST PORTABLE   Final Result   Diffuse asymmetric right-sided airspace disease, greatest in the perihilar   region, which may reflect pneumonia or asymmetric pulmonary edema. Assessment/Plan:    Active Hospital Problems    Diagnosis     CAP (community acquired pneumonia) [J18.9]      Priority: Medium    General weakness [R53.1]      Priority: Medium    Elevated lactic acid level [R79.89]      Priority: Medium    Diabetes ([facility] [E11.9]     COPD exacerbation ([facility] [J44.1]     Severe sepsis ([facility] [A41.9, R65.20]     ROLY (acute kidney injury) ([facility] [N17.9]        1. Severe sepsis, pneumonia, admitted to ICU for low blood pressure yesterday, clinically appears improving, started on admission on cefepime azithromycin and vancomycin, MRSA probe negative vancomycin discontinued, pulmonary following. 2.  Pneumonia, currently on cefepime and azithromycin. Continue for now. 3.  COPD exacerbation, IV steroids, DuoNeb, antibiotics as above. 4.  Generalized weakness, due to above expecting improvement  5. Diabetes mellitus, sliding scale  6. Acute kidney injury, prerenal improving  7. Elevated lactic acid, trending this morning  8. Tobacco abuse, counseled for quitting  9. Reported hemoptysis, improved discussed with nurse, I will start heparin for DVT prophylaxis    DVT Prophylaxis: Heparin  Diet: ADULT DIET;  Regular; 4 carb choices (60 gm/meal)  Code Status: Full Code      Dispo -keep in ICU    Alex Barnett MD

## 2022-07-04 NOTE — FLOWSHEET NOTE
Perfect Serve message sent to Dr Aly El regarding repeat Renal panel. Labs reviewed, order received for 40mEq PO KCL X1 now. Will implement.

## 2022-07-04 NOTE — CONSULTS
REASON FOR CONSULTATION/CC: pneumonia       Consult at request of Chacorta Ortiz MD for pneumonia     PCP: HECTOR Pompa - CNP  Established Pulmonologist:  None    HISTORY OF PRESENT ILLNESS: Harley Hays is a 78y.o. year old female with a history of copd who presents with        Patient was seen approximately 2 weeks ago as an outpatient secondary to COPD. She was doing well with Trelegy and DuoNebs as needed. She was given prednisone to have on hand in case of flare. She came to the emergency room yesterday secondary to decreased appetite associated with nausea vomiting worsening over 5 days. She was found to be hypoxic at 82%    This note may have been  transcribed using [id number] [facility]. Please disregard any translational errors. Assessment:        COPD, Moderately severe FEV1 59%  Tobacco abuse. Pulmonary nodules, pulmonary nodule 2022, stable pulmonary nodule and follow-up. Cancer screening June 2023    Plan:      Hospital Day 1     Severe pneumonia  *CT lung cancer screening completed June 20 without any signs of pneumonia. She now has a procalcitonin of 11.6 at admission. Interesting lack of leukocytosis and hyponatremia. Chest x-ray consistent with pneumonia. Legionella is an interesting consideration versus aspiration  *MRSA probe negative. Respiratory culture pending. Legionella and strep antigens ordered. COVID-negative  *Agree with cefepime stop date 7 days and azithromycin stop date tolerated. *Discontinue vancomycin with negative MRSA probe. Cloteal Rosario COPD  *Solu-Medrol for COPD exacerbation and severe pneumonia  *DuoNebs  *Nebulized desonide      Acute kidney injury with metabolic acidosis  *Improving quickly with IV fluid  *Creatinine improving with IV fluids. Bicarb slightly improved. Change to bicarb drip. EP 1 3 PM.  *Rhonchi on exam, start Acapella.       Acute hypoxemia  *Severe pneumonia with COPD  *Wean to 90% saturation      Prophylaxis  - GI -  protonix    - DVT -SCD secondary to hemoptysis. Months is likely limited secondary to pneumonia but monitor. Likely able to start DVT prophylaxis tomorrow  - C. Diff - culturelle        Nutrition  - ADULT DIET; Regular; 4 carb choices (60 gm/meal)    Mobility       Access       Keep in ICU for now. This note was transcribed using [id number]. Please disregard any translational errors. Thank you for the consult    [facility]  [phone]             Data:     PAST MEDICAL HISTORY:  Past Medical History:   Diagnosis Date    Chronic pain syndrome     Colorectal polyps     COPD (chronic obstructive pulmonary disease) (HCC)     CTS (carpal tunnel syndrome)     BILATERAL    DDD (degenerative disc disease), lumbar     Depression     Family hx of colon cancer     Fibromyalgia     Hyperlipemia     IBS (irritable bowel syndrome)     Lumbar spondylosis     New onset type 2 diabetes mellitus ([facility] 2/3/2020    Urticaria, chronic        PAST SURGICAL HISTORY:  Past Surgical History:   Procedure Laterality Date    CARPAL TUNNEL RELEASE Bilateral     CERVICAL POLYP REMOVAL  9/2004    INTRACAPSULAR CATARACT EXTRACTION Left 6/23/2020    Phacoemulsification with intraocular lens implant performed by Magalie Javier MD at [place] [address] Right 7/14/2020    Phacoemulsification with intraocular lens implant performed by Magalie Javier MD at [place] [address]      patient denies        FAMILY HISTORY:  family history includes Alzheimer's Disease in her mother; Cancer in her father; Diabetes in her daughter, daughter, and daughter; Heart Disease in her brother; Heart Failure in her brother. SOCIAL HISTORY:   reports that she has been smoking cigarettes. She started smoking about 60 years ago.  She has a 55.00 pack-year smoking history. She has never used smokeless tobacco.    Scheduled Meds:   sodium chloride flush  5-40 mL IntraVENous 2 times per day    methylPREDNISolone  40 mg IntraVENous Q12H    ipratropium-albuterol  1 ampule Inhalation Q4H WA    insulin lispro  0-6 Units SubCUTAneous TID WC    insulin lispro  0-3 Units SubCUTAneous Nightly    azithromycin  500 mg IntraVENous Q24H    atorvastatin  40 mg Oral Daily    gabapentin  100 mg Oral BID    pantoprazole  40 mg Oral Daily    vancomycin (VANCOCIN) intermittent dosing (placeholder)   Other RX Placeholder    cefepime  1,000 mg IntraVENous Q24H       Continuous Infusions:   sodium chloride      dextrose      sodium chloride 100 mL/hr at 07/04/22 0528    sodium chloride 5 mL/hr at 07/04/22 0528       PRN Meds:  sodium chloride flush, sodium chloride, ondansetron **OR** ondansetron, polyethylene glycol, acetaminophen **OR** acetaminophen, glucose, dextrose bolus **OR** dextrose bolus, glucagon (rDNA), dextrose, albuterol sulfate HFA    ALLERGIES:  Patient has No Known Allergies. REVIEW OF SYSTEMS:  Constitutional: Negative for fever    HENT: Negative for sore throat  Eyes: Negative for redness   Respiratory: Negative for dyspnea, cough  Cardiovascular: Negative for chest pain  Gastrointestinal: Negative for vomiting, diarrhea    Genitourinary: Negative for hematuria   Musculoskeletal: Negative for arthralgias   Skin: Negative for rash  Neurological: Negative for syncope  Hematological: Negative for adenopathy  Psychiatric/Behavorial: Negative for anxiety    Objective:   PHYSICAL EXAM:  Blood pressure (!) 101/49, pulse (!) 115, temperature 98.9 °F (37.2 °C), temperature source Oral, resp. rate (!) 32, height 5' 4\" (1.626 m), weight 134 lb 14.7 oz (61.2 kg), SpO2 92 %, not currently breastfeeding.'  Gen: Mild distress with some tachypnea. Eyes: PERRL. No sclera icterus. No conjunctival injection. ENT: No discharge. Pharynx clear.  External appearance of ears and nose normal.  Neck: Trachea midline. No obvious mass. Resp: Slight increased work of breathing. Rhonchi. CV: Regular rate. Regular rhythm. No murmur or rub. No edema. GI: Non-tender. Non-distended. No hernia. Skin: Warm, dry, normal texture and turgor. No nodule on exposed extremities. Lymph: No cervical LAD. No supraclavicular LAD. M/S: No cyanosis. No clubbing. No joint deformity. Neuro: Moves all four extremities. Psych: Oriented x 3. No anxiety. Awake. Alert. Intact judgement and insight. Data Reviewed:   LABS:  CBC:   Recent Labs     07/03/22  1511 07/04/22  0419   WBC 7.5 7.0   HGB 14.6 14.9   HCT 42.4 44.7   MCV 95.9 97.7    273     BMP:   Recent Labs     07/03/22  1510 07/04/22  0419   * 136   K 3.7 3.5   CL 92* 104   CO2 18* 16*   BUN 69* 53*   CREATININE 4.1* 2.2*     LIVER PROFILE:   Recent Labs     07/04/22  0419   AST 18   ALT 11   BILITOT 0.6   ALKPHOS 58     PT/INR: No results for input(s): PROTIME, INR in the last 72 hours. APTT: No results for input(s): APTT in the last 72 hours. UA:  Recent Labs     07/03/22  1511   COLORU DARK YELLOW*   PHUR 5.0   WBCUA 3-5   RBCUA 0-2   MUCUS 3+*   BACTERIA 4+*   CLARITYU TURBID*   SPECGRAV 1.022   LEUKOCYTESUR TRACE*   UROBILINOGEN 1.0   BILIRUBINUR MODERATE*   BLOODU Negative   GLUCOSEU Negative   AMORPHOUS 2+     No results for input(s): PHART, IDZ7DIO, PO2ART in the last 72 hours. Radiology Review:  Pertinent images / reports were reviewed as a part of this visit. CT Chest w/ contrast: No results found for this or any previous visit. CT Chest w/o contrast: Results for orders placed during the hospital encounter of 06/20/22    CT CHEST WO CONTRAST    Narrative  EXAMINATION:  CT OF THE CHEST WITHOUT CONTRAST 6/20/2022 10:55 am    TECHNIQUE:  CT of the chest was performed without the administration of intravenous  contrast. Multiplanar reformatted images are provided for review. Automated  exposure control, iterative reconstruction, and/or weight based adjustment of  the mA/kV was utilized to reduce the radiation dose to as low as reasonably  achievable. COMPARISON:  03/24/2022. HISTORY:  ORDERING SYSTEM PROVIDED HISTORY: Pulmonary nodule seen on imaging study  TECHNOLOGIST PROVIDED HISTORY:  Reason for exam:->pulmonary nodule  Reason for Exam: f/u nodule    FINDINGS:  Mediastinum: There are a few less than 1 cm mediastinal lymph nodes but no  lymphadenopathy is seen. The thoracic aorta is not aneurysmal.    Lungs/pleura: The lung parenchyma demonstrates biapical scarring. There are  emphysematous changes. There has been no significant change in the  ground-glass pulmonary nodule within the left lower lobe adjacent to the  fissure measuring 5 mm (series 3 image 84). There has also been no  significant change in the right upper lobe pulmonary nodule adjacent to the  bronchovascular bundle measuring 6.4 x 3.6 mm (series 3 image 45. No new  pulmonary nodules are seen. No airspace consolidations are seen. No pleural  effusions or pneumothoraces are noted. Upper Abdomen: The visualized portions of the upper abdomen appear  unremarkable. There is a small cyst within the left kidney. Soft Tissues/Bones: No acute bony abnormalities are noted. There are  degenerative changes involving the spine. Impression  1. COPD. 2. No significant change pulmonary nodules as discussed above. RECOMMENDATIONS:  Fleischner Society guidelines for follow-up and management of incidentally  detected pulmonary nodules:    Multiple Solid Nodules:    Nodule size less than 6 mm  In a low-risk patient, no routine follow-up. In a high-risk patient, optional CT at 12 months. Nodule size equals 6-8 mm  In a low-risk patient, CT at 3-6 months, then consider CT at 18-24 months. In a high-risk patient, CT at 3-6 months, then CT at 18-24 months.     Nodule size greater than 8 mm  In a low-risk patient, CT at 3-6 months, then consider CT at 18-24 months. In a high-risk patient, CT at 3-6 months, then CT at 18-24 months. - Low risk patients include individuals with minimal or absent history of  smoking and other known risk factors. - High risk patients include individuals with a history or smoking or known  risk factors. Radiology 2017 http://pubs. rsna.org/doi/full/10.1148/radiol. 2163829568      CTPA: No results found for this or any previous visit. CXR PA/LAT: Results for orders placed during the hospital encounter of 02/03/20    XR CHEST STANDARD (2 VW)    Narrative  EXAMINATION:  TWO XRAY VIEWS OF THE CHEST    2/3/2020 8:35 pm    COMPARISON:  01/03/2020    HISTORY:  ORDERING SYSTEM PROVIDED HISTORY: sob  TECHNOLOGIST PROVIDED HISTORY:  Reason for exam:->sob  Reason for Exam: sob  Acuity: Acute  Type of Exam: Initial    FINDINGS:  Chronic interstitial opacities are identified. No acute focal infiltrate is  identified. No pneumothorax is seen. No free air. The cardial pericardial  silhouette is unremarkable. The bones are demineralized. No acute bony  abnormality detected. Impression  No acute abnormality identified. CXR portable: Results for orders placed during the hospital encounter of 07/03/22    XR CHEST PORTABLE    Narrative  EXAMINATION:  ONE XRAY VIEW OF THE CHEST    7/3/2022 3:11 pm    COMPARISON:  02/03/2020    HISTORY:  ORDERING SYSTEM PROVIDED HISTORY: SOB, hypoxia  TECHNOLOGIST PROVIDED HISTORY:  Reason for exam:->SOB, hypoxia  Reason for Exam: SOB; hypoxia    FINDINGS:  Asymmetric heterogeneous opacity is seen throughout the right lung, greatest  within the mid lung. Left lung is clear. No significant pleural effusion. No pneumothorax. Cardiac and mediastinal silhouettes are reflective of  patient rotation.     Impression  Diffuse asymmetric right-sided airspace disease, greatest in the perihilar  region, which may reflect pneumonia or asymmetric pulmonary edema.

## 2022-07-04 NOTE — FLOWSHEET NOTE
Pt up to [facility] x1 assist, tolerated well. Pt had BM. While in the chair, the purewick was out and the depends was saturated in urine. Diana care given, pads changed. Pt back in bed, new purewick placed with depends. Call light in reach, will continue to monitor.

## 2022-07-04 NOTE — PROGRESS NOTES
Physical Therapy  Facility/Department: [address] ICU  Physical Therapy Initial Assessment    Name: Vern Huang  : 1945  MRN: [id number]  Date of Service: 2022    Discharge Recommendations:  Continue to assess pending progress,Home with assist PRN   PT Equipment Recommendations  Other: Will monitor for potential equipt needs. Current Am-Pac Score 18/24    Assessment   Body Structures, Functions, Activity Limitations Requiring Skilled Therapeutic Intervention: Decreased functional mobility ; Decreased endurance  Assessment: 77 y/o female admit 7/3/2022 with COPD Exac, ROLY, UTI and Sepsis. PMH as noted including COPD, Osteoporosis, Fibromyalgia, B Carpal Tunnel Release. PTA pt living with sign other in apt setting with level entry/access; independent daily care and functional mobility (without assist device). Currently,   Pt currently requiring O2 14L (no home O2 needs pta); pt rell eob/oob to chair with Min assist.  No LOB although very limited endurance. Desat briefly mid 66's although recovering once to chair; cues for slow/deep breathing. At this time pt reports adequate assist/support for d/c home; will monitor for potential home PT needs. Therapy Prognosis: Fair;Good  Decision Making: Medium Complexity  History: 77 y/o female admit 7/3/2022 with COPD Exac, ROLY, UTI and Sepsis. PMH as noted including COPD, Osteoporosis, Fibromyalgia, B Carpal Tunnel Release. Exam: See above. Clinical Presentation: See above. Barriers to Learning: Endurance. Requires PT Follow-Up: Yes  Activity Tolerance  Activity Tolerance: Patient limited by endurance  Activity Tolerance Comments: Pt currently requiring O2 14L (no home O2 needs pta); pt rell eob/oob to chair with Min assist.  No LOB although very limited endurance. Desat briefly mid 66's although recovering once to chair; cues for slow/deep breathing.      Plan   Plan  Plan: 3-5 times per week  Current Treatment Recommendations: Functional mobility training,Transfer training,Gait training,Endurance training,Safety education & training,Patient/Caregiver education & training  Safety Devices  Type of Devices: Call light within reach,Chair alarm in place,Left in chair,Nurse notified     Restrictions  Restrictions/Precautions  Restrictions/Precautions: Fall Risk  Position Activity Restriction  Other position/activity restrictions: O2 14L via High Flow. Subjective   General  Chart Reviewed: Yes  Patient assessed for rehabilitation services?: Yes  Additional Pertinent Hx: 79 y/o female admit 7/3/2022 with COPD Exac, ROLY, UTI and Sepsis. PMH as noted including COPD, Osteoporosis, Fibromyalgia, B Carpal Tunnel Release. Family / Caregiver Present: No  Referring Practitioner: Dr. Jaskaran Cervantes: Within Functional Limits  Subjective  Subjective: Pt agreeable to PT Eval/Rx. Social/Functional History  Social/Functional History  Lives With: Significant other (Sign other (Sasha Reyes). )  Type of Home: Apartment (1st floor.)  Home Layout: One level (Laundry in apt.)  Home Access: Level entry  Bathroom Shower/Tub: Tub/Shower unit  Bathroom Toilet: Standard  Bathroom Equipment: Shower chair  Bathroom Accessibility: Accessible  Home Equipment: Cane (No Home O2 pta.)  ADL Assistance: Independent  Homemaking Assistance:  (Shared with sign other.)  Ambulation Assistance: Independent (Without assist device pta.)  Transfer Assistance: Independent  Active : No (Sign other drives.)  Occupation: Retired  Additional Comments: Pt reports adequate assist/support upon return home.   Vision/Hearing  Vision  Vision: Within Functional Limits  Hearing  Hearing: Within functional limits    Cognition   Orientation  Overall Orientation Status: Within Functional Limits  Cognition  Overall Cognitive Status: WFL     Objective         Gross Assessment  AROM: Within functional limits  Strength: Generally decreased, functional                    Bed mobility  Supine to Sit: Minimal assistance (HOB elevated. Use of bedrail.)  Transfers  Sit to Stand: Contact guard assistance  Stand to sit: Contact guard assistance  Ambulation  Surface: level tile  Distance: 4-5 steps pivot transfer to chair with Min assist.  No LE buckling/giving way. Limited endurance. Balance  Sitting - Static: Good  Sitting - Dynamic: Good  Standing - Static: Fair;+             AM-PAC Score  AM-PAC Inpatient Mobility Raw Score : 18 (07/04/22 1143)  AM-PAC Inpatient T-Scale Score : 43.63 (07/04/22 1143)  Mobility Inpatient CMS 0-100% Score: 46.58 (07/04/22 1143)  Mobility Inpatient CMS G-Code Modifier : CK (07/04/22 1143)          Goals  Short Term Goals  Time Frame for Short term goals: Upon d/c acute care setting. Short term goal 1: Bed Mob Supervision. Short term goal 2: Transfers with/without assist device SBA. Short term goal 3: Amb with/without assist device 50' SBA. Patient Goals   Patient goals : Return home with sign other.        Education  Patient Education  Education Given To: Patient  Education Provided Comments: Role of PT, POC, Need to call for assist.  Education Method: Verbal  Barriers to Learning: None  Education Outcome: Verbalized understanding;Continued education needed      Therapy Time   Individual Concurrent Group Co-treatment   Time In 0630         Time Out 0700         Minutes [address], PT

## 2022-07-05 ENCOUNTER — APPOINTMENT (OUTPATIENT)
Dept: GENERAL RADIOLOGY | Age: 77
DRG: 871 | End: 2022-07-05
Payer: MEDICARE

## 2022-07-05 PROBLEM — J96.22 ACUTE ON CHRONIC RESPIRATORY FAILURE WITH HYPERCAPNIA (HCC): Status: ACTIVE | Noted: 2017-01-10

## 2022-07-05 LAB
ANION GAP SERPL CALCULATED.3IONS-SCNC: 15 MMOL/L (ref 3–16)
BASE EXCESS ARTERIAL: -3 (ref -3–3)
BASOPHILS ABSOLUTE: 0 K/UL (ref 0–0.2)
BASOPHILS RELATIVE PERCENT: 0.1 %
BUN BLDV-MCNC: 35 MG/DL (ref 7–20)
CALCIUM IONIZED: 1.05 MMOL/L (ref 1.12–1.32)
CALCIUM SERPL-MCNC: 7.8 MG/DL (ref 8.3–10.6)
CHLORIDE BLD-SCNC: 99 MMOL/L (ref 99–110)
CO2: 22 MMOL/L (ref 21–32)
CREAT SERPL-MCNC: 1 MG/DL (ref 0.6–1.2)
EOSINOPHILS ABSOLUTE: 0 K/UL (ref 0–0.6)
EOSINOPHILS RELATIVE PERCENT: 0 %
GFR AFRICAN AMERICAN: >60
GFR NON-AFRICAN AMERICAN: 54
GLUCOSE BLD-MCNC: 147 MG/DL (ref 70–99)
GLUCOSE BLD-MCNC: 158 MG/DL (ref 70–99)
GLUCOSE BLD-MCNC: 194 MG/DL (ref 70–99)
GLUCOSE BLD-MCNC: 197 MG/DL (ref 70–99)
GLUCOSE BLD-MCNC: 238 MG/DL (ref 70–99)
GLUCOSE BLD-MCNC: 259 MG/DL (ref 70–99)
HCO3 ARTERIAL: 24 MMOL/L (ref 21–29)
HCT VFR BLD CALC: 32 % (ref 36–48)
HEMOGLOBIN: 11.3 G/DL (ref 12–16)
HEMOGLOBIN: 15.1 GM/DL (ref 12–16)
LACTATE: 2.32 MMOL/L (ref 0.4–2)
LACTIC ACID: 1.4 MMOL/L (ref 0.4–2)
LACTIC ACID: 1.6 MMOL/L (ref 0.4–2)
LACTIC ACID: 1.6 MMOL/L (ref 0.4–2)
LACTIC ACID: 2.3 MMOL/L (ref 0.4–2)
LYMPHOCYTES ABSOLUTE: 0.1 K/UL (ref 1–5.1)
LYMPHOCYTES RELATIVE PERCENT: 1.4 %
MAGNESIUM: 1.4 MG/DL (ref 1.8–2.4)
MCH RBC QN AUTO: 33 PG (ref 26–34)
MCHC RBC AUTO-ENTMCNC: 35.2 G/DL (ref 31–36)
MCV RBC AUTO: 93.7 FL (ref 80–100)
MONOCYTES ABSOLUTE: 0.3 K/UL (ref 0–1.3)
MONOCYTES RELATIVE PERCENT: 3.2 %
NEUTROPHILS ABSOLUTE: 7.9 K/UL (ref 1.7–7.7)
NEUTROPHILS RELATIVE PERCENT: 95.3 %
O2 SAT, ARTERIAL: 85 % (ref 93–100)
PCO2 ARTERIAL: 51.9 MM HG (ref 35–45)
PDW BLD-RTO: 14 % (ref 12.4–15.4)
PERFORMED ON: ABNORMAL
PH ARTERIAL: 7.27 (ref 7.35–7.45)
PHOSPHORUS: 1.8 MG/DL (ref 2.5–4.9)
PLATELET # BLD: 197 K/UL (ref 135–450)
PMV BLD AUTO: 8.3 FL (ref 5–10.5)
PO2 ARTERIAL: 56.8 MM HG (ref 75–108)
POC HEMATOCRIT: 44 % (ref 36–48)
POC POTASSIUM: 3.3 MMOL/L (ref 3.5–5.1)
POC SAMPLE TYPE: ABNORMAL
POC SODIUM: 135 MMOL/L (ref 136–145)
POTASSIUM SERPL-SCNC: 3.5 MMOL/L (ref 3.5–5.1)
PROCALCITONIN: 6.77 NG/ML (ref 0–0.15)
RBC # BLD: 3.42 M/UL (ref 4–5.2)
REASON FOR REJECTION: NORMAL
REJECTED TEST: NORMAL
SODIUM BLD-SCNC: 136 MMOL/L (ref 136–145)
TCO2 ARTERIAL: 26 MMOL/L
WBC # BLD: 8.3 K/UL (ref 4–11)

## 2022-07-05 PROCEDURE — 94640 AIRWAY INHALATION TREATMENT: CPT

## 2022-07-05 PROCEDURE — 2700000000 HC OXYGEN THERAPY PER DAY

## 2022-07-05 PROCEDURE — 84100 ASSAY OF PHOSPHORUS: CPT

## 2022-07-05 PROCEDURE — 85014 HEMATOCRIT: CPT

## 2022-07-05 PROCEDURE — 84132 ASSAY OF SERUM POTASSIUM: CPT

## 2022-07-05 PROCEDURE — 6360000002 HC RX W HCPCS: Performed by: INTERNAL MEDICINE

## 2022-07-05 PROCEDURE — 84145 PROCALCITONIN (PCT): CPT

## 2022-07-05 PROCEDURE — 85025 COMPLETE CBC W/AUTO DIFF WBC: CPT

## 2022-07-05 PROCEDURE — 2580000003 HC RX 258: Performed by: INTERNAL MEDICINE

## 2022-07-05 PROCEDURE — 94669 MECHANICAL CHEST WALL OSCILL: CPT

## 2022-07-05 PROCEDURE — 71045 X-RAY EXAM CHEST 1 VIEW: CPT

## 2022-07-05 PROCEDURE — 2580000003 HC RX 258: Performed by: NURSE PRACTITIONER

## 2022-07-05 PROCEDURE — 6370000000 HC RX 637 (ALT 250 FOR IP): Performed by: INTERNAL MEDICINE

## 2022-07-05 PROCEDURE — 84295 ASSAY OF SERUM SODIUM: CPT

## 2022-07-05 PROCEDURE — 36415 COLL VENOUS BLD VENIPUNCTURE: CPT

## 2022-07-05 PROCEDURE — 97530 THERAPEUTIC ACTIVITIES: CPT

## 2022-07-05 PROCEDURE — 2500000003 HC RX 250 WO HCPCS: Performed by: NURSE PRACTITIONER

## 2022-07-05 PROCEDURE — 83735 ASSAY OF MAGNESIUM: CPT

## 2022-07-05 PROCEDURE — 94760 N-INVAS EAR/PLS OXIMETRY 1: CPT

## 2022-07-05 PROCEDURE — 51702 INSERT TEMP BLADDER CATH: CPT

## 2022-07-05 PROCEDURE — 82803 BLOOD GASES ANY COMBINATION: CPT

## 2022-07-05 PROCEDURE — 99233 SBSQ HOSP IP/OBS HIGH 50: CPT | Performed by: INTERNAL MEDICINE

## 2022-07-05 PROCEDURE — 97166 OT EVAL MOD COMPLEX 45 MIN: CPT

## 2022-07-05 PROCEDURE — 94660 CPAP INITIATION&MGMT: CPT

## 2022-07-05 PROCEDURE — 6360000002 HC RX W HCPCS: Performed by: NURSE PRACTITIONER

## 2022-07-05 PROCEDURE — 82947 ASSAY GLUCOSE BLOOD QUANT: CPT

## 2022-07-05 PROCEDURE — 83605 ASSAY OF LACTIC ACID: CPT

## 2022-07-05 PROCEDURE — 2000000000 HC ICU R&B

## 2022-07-05 PROCEDURE — 82330 ASSAY OF CALCIUM: CPT

## 2022-07-05 PROCEDURE — APPNB15 APP NON BILLABLE TIME 0-15 MINS: Performed by: NURSE PRACTITIONER

## 2022-07-05 PROCEDURE — 80048 BASIC METABOLIC PNL TOTAL CA: CPT

## 2022-07-05 PROCEDURE — 97110 THERAPEUTIC EXERCISES: CPT

## 2022-07-05 RX ORDER — METRONIDAZOLE 500 MG/100ML
500 INJECTION, SOLUTION INTRAVENOUS EVERY 8 HOURS
Status: DISCONTINUED | OUTPATIENT
Start: 2022-07-05 | End: 2022-07-08

## 2022-07-05 RX ORDER — MAGNESIUM SULFATE IN WATER 40 MG/ML
4000 INJECTION, SOLUTION INTRAVENOUS ONCE
Status: COMPLETED | OUTPATIENT
Start: 2022-07-05 | End: 2022-07-05

## 2022-07-05 RX ORDER — FUROSEMIDE 10 MG/ML
40 INJECTION INTRAMUSCULAR; INTRAVENOUS ONCE
Status: COMPLETED | OUTPATIENT
Start: 2022-07-05 | End: 2022-07-05

## 2022-07-05 RX ADMIN — METRONIDAZOLE 500 MG: 500 INJECTION, SOLUTION INTRAVENOUS at 19:56

## 2022-07-05 RX ADMIN — SODIUM CHLORIDE, PRESERVATIVE FREE 10 ML: 5 INJECTION INTRAVENOUS at 09:03

## 2022-07-05 RX ADMIN — BUDESONIDE 500 MCG: 0.5 SUSPENSION RESPIRATORY (INHALATION) at 08:18

## 2022-07-05 RX ADMIN — GABAPENTIN 100 MG: 100 CAPSULE ORAL at 21:27

## 2022-07-05 RX ADMIN — INSULIN LISPRO 1 UNITS: 100 INJECTION, SOLUTION INTRAVENOUS; SUBCUTANEOUS at 12:10

## 2022-07-05 RX ADMIN — INSULIN LISPRO 1 UNITS: 100 INJECTION, SOLUTION INTRAVENOUS; SUBCUTANEOUS at 09:02

## 2022-07-05 RX ADMIN — METHYLPREDNISOLONE SODIUM SUCCINATE 40 MG: 40 INJECTION, POWDER, FOR SOLUTION INTRAMUSCULAR; INTRAVENOUS at 19:53

## 2022-07-05 RX ADMIN — BUDESONIDE 500 MCG: 0.5 SUSPENSION RESPIRATORY (INHALATION) at 20:48

## 2022-07-05 RX ADMIN — IPRATROPIUM BROMIDE AND ALBUTEROL SULFATE 1 AMPULE: .5; 3 SOLUTION RESPIRATORY (INHALATION) at 12:12

## 2022-07-05 RX ADMIN — FUROSEMIDE 40 MG: 10 INJECTION, SOLUTION INTRAMUSCULAR; INTRAVENOUS at 05:31

## 2022-07-05 RX ADMIN — ATORVASTATIN CALCIUM 40 MG: 40 TABLET, FILM COATED ORAL at 09:02

## 2022-07-05 RX ADMIN — HEPARIN SODIUM 5000 UNITS: 5000 INJECTION INTRAVENOUS; SUBCUTANEOUS at 21:30

## 2022-07-05 RX ADMIN — IPRATROPIUM BROMIDE AND ALBUTEROL SULFATE 1 AMPULE: .5; 3 SOLUTION RESPIRATORY (INHALATION) at 20:48

## 2022-07-05 RX ADMIN — HEPARIN SODIUM 5000 UNITS: 5000 INJECTION INTRAVENOUS; SUBCUTANEOUS at 05:31

## 2022-07-05 RX ADMIN — POTASSIUM PHOSPHATE, MONOBASIC AND POTASSIUM PHOSPHATE, DIBASIC 20 MMOL: 224; 236 INJECTION, SOLUTION, CONCENTRATE INTRAVENOUS at 16:17

## 2022-07-05 RX ADMIN — METRONIDAZOLE 500 MG: 500 INJECTION, SOLUTION INTRAVENOUS at 12:07

## 2022-07-05 RX ADMIN — METHYLPREDNISOLONE SODIUM SUCCINATE 40 MG: 40 INJECTION, POWDER, FOR SOLUTION INTRAMUSCULAR; INTRAVENOUS at 09:02

## 2022-07-05 RX ADMIN — CEFEPIME HYDROCHLORIDE 1000 MG: 1 INJECTION, POWDER, FOR SOLUTION INTRAMUSCULAR; INTRAVENOUS at 14:50

## 2022-07-05 RX ADMIN — MAGNESIUM SULFATE HEPTAHYDRATE 4000 MG: 40 INJECTION, SOLUTION INTRAVENOUS at 12:12

## 2022-07-05 RX ADMIN — Medication 2 CAPSULE: at 09:02

## 2022-07-05 RX ADMIN — HEPARIN SODIUM 5000 UNITS: 5000 INJECTION INTRAVENOUS; SUBCUTANEOUS at 12:10

## 2022-07-05 RX ADMIN — Medication 2 CAPSULE: at 21:27

## 2022-07-05 RX ADMIN — OXYCODONE AND ACETAMINOPHEN 1 TABLET: 5; 325 TABLET ORAL at 09:16

## 2022-07-05 RX ADMIN — PANTOPRAZOLE SODIUM 40 MG: 40 TABLET, DELAYED RELEASE ORAL at 09:02

## 2022-07-05 RX ADMIN — CEFEPIME HYDROCHLORIDE 1000 MG: 1 INJECTION, POWDER, FOR SOLUTION INTRAMUSCULAR; INTRAVENOUS at 01:35

## 2022-07-05 RX ADMIN — IPRATROPIUM BROMIDE AND ALBUTEROL SULFATE 1 AMPULE: .5; 3 SOLUTION RESPIRATORY (INHALATION) at 16:00

## 2022-07-05 RX ADMIN — GABAPENTIN 100 MG: 100 CAPSULE ORAL at 09:02

## 2022-07-05 RX ADMIN — TRAZODONE HYDROCHLORIDE 25 MG: 50 TABLET ORAL at 21:27

## 2022-07-05 RX ADMIN — SODIUM CHLORIDE, PRESERVATIVE FREE 10 ML: 5 INJECTION INTRAVENOUS at 21:32

## 2022-07-05 RX ADMIN — DULOXETINE HYDROCHLORIDE 60 MG: 60 CAPSULE, DELAYED RELEASE ORAL at 09:02

## 2022-07-05 RX ADMIN — AZITHROMYCIN MONOHYDRATE 500 MG: 500 INJECTION, POWDER, LYOPHILIZED, FOR SOLUTION INTRAVENOUS at 18:46

## 2022-07-05 RX ADMIN — IPRATROPIUM BROMIDE AND ALBUTEROL SULFATE 1 AMPULE: .5; 3 SOLUTION RESPIRATORY (INHALATION) at 08:17

## 2022-07-05 RX ADMIN — INSULIN LISPRO 1 UNITS: 100 INJECTION, SOLUTION INTRAVENOUS; SUBCUTANEOUS at 16:23

## 2022-07-05 RX ADMIN — INSULIN LISPRO 2 UNITS: 100 INJECTION, SOLUTION INTRAVENOUS; SUBCUTANEOUS at 21:27

## 2022-07-05 ASSESSMENT — PAIN SCALES - GENERAL
PAINLEVEL_OUTOF10: 7
PAINLEVEL_OUTOF10: 3
PAINLEVEL_OUTOF10: 0
PAINLEVEL_OUTOF10: 7
PAINLEVEL_OUTOF10: 3
PAINLEVEL_OUTOF10: 9

## 2022-07-05 ASSESSMENT — PAIN DESCRIPTION - FREQUENCY: FREQUENCY: CONTINUOUS

## 2022-07-05 ASSESSMENT — PAIN DESCRIPTION - PAIN TYPE: TYPE: CHRONIC PAIN

## 2022-07-05 ASSESSMENT — PAIN - FUNCTIONAL ASSESSMENT: PAIN_FUNCTIONAL_ASSESSMENT: PREVENTS OR INTERFERES SOME ACTIVE ACTIVITIES AND ADLS

## 2022-07-05 ASSESSMENT — PAIN DESCRIPTION - DESCRIPTORS: DESCRIPTORS: ACHING;DISCOMFORT

## 2022-07-05 ASSESSMENT — PAIN DESCRIPTION - LOCATION
LOCATION: BACK

## 2022-07-05 ASSESSMENT — PAIN DESCRIPTION - ONSET: ONSET: ON-GOING

## 2022-07-05 NOTE — PROGRESS NOTES
Hospitalist Progress Note      PCP: Ryan Ybarra, APRN - CNP    Date of Admission: 7/3/2022        Subjective: Feels okay this morning, had an episode of hypoxia this morning, still coughing. No fever or chills at this time      Medications:  Reviewed    Infusion Medications    sodium chloride      dextrose       Scheduled Medications    metroNIDAZOLE  500 mg IntraVENous Q8H    budesonide  0.5 mg Nebulization BID    lactobacillus  2 capsule Oral BID    heparin (porcine)  5,000 Units SubCUTAneous 3 times per day    cefepime  1,000 mg IntraVENous Q12H    DULoxetine  60 mg Oral Daily    traZODone  25 mg Oral Nightly    sodium chloride flush  5-40 mL IntraVENous 2 times per day    methylPREDNISolone  40 mg IntraVENous Q12H    ipratropium-albuterol  1 ampule Inhalation Q4H WA    insulin lispro  0-6 Units SubCUTAneous TID WC    insulin lispro  0-3 Units SubCUTAneous Nightly    azithromycin  500 mg IntraVENous Q24H    atorvastatin  40 mg Oral Daily    gabapentin  100 mg Oral BID    pantoprazole  40 mg Oral Daily     PRN Meds: oxyCODONE-acetaminophen, sodium chloride flush, sodium chloride, ondansetron **OR** ondansetron, polyethylene glycol, acetaminophen **OR** acetaminophen, glucose, dextrose bolus **OR** dextrose bolus, glucagon (rDNA), dextrose, albuterol sulfate HFA      Intake/Output Summary (Last 24 hours) at 7/5/2022 1000  Last data filed at 7/5/2022 0950  Gross per 24 hour   Intake 2975.1 ml   Output --   Net 2975.1 ml       Physical Exam Performed:    BP (!) 143/76   Pulse (!) 102   Temp 97.7 °F (36.5 °C) (Oral)   Resp 22   Ht 5' 4\" (1.626 m)   Wt 134 lb 14.7 oz (61.2 kg)   SpO2 98%   BMI 23.16 kg/m²     General appearance: Mild distress  Neck: Supple  Respiratory: Coarse breath sounds  Cardiovascular: Regular rate and rhythm with normal S1/S2 without murmurs, rubs or gallops.   Abdomen: Soft, non-tender, non-distended   Musculoskeletal: No clubbing, cyanosis   Skin: Skin color, texture, turgor normal.  No rashes or lesions. Neurologic: No Focal weakness  Psychiatric: Alert and oriented  Capillary Refill: Brisk,3 seconds, normal   Peripheral Pulses: +2 palpable, equal bilaterally       Labs:   Recent Labs     07/03/22  1511 07/03/22  1511 07/04/22  0419 07/05/22  0458 07/05/22  0926   WBC 7.5  --  7.0  --  8.3   HGB 14.6   < > 14.9 15.1 11.3*   HCT 42.4  --  44.7  --  32.0*     --  273  --  197    < > = values in this interval not displayed. Recent Labs     07/04/22  0419 07/04/22  1441 07/05/22  0722    138 136   K 3.5 3.3* 3.5    104 99   CO2 16* 17* 22   BUN 53* 46* 35*   CREATININE 2.2* 1.5* 1.0   CALCIUM 8.0* 7.8* 7.8*     Recent Labs     07/04/22  0419   AST 18   ALT 11   BILITOT 0.6   ALKPHOS 58     No results for input(s): INR in the last 72 hours. Recent Labs     07/03/22  1510   TROPONINI 0.01       Urinalysis:      Lab Results   Component Value Date/Time    NITRU Negative 07/03/2022 03:11 PM    WBCUA 3-5 07/03/2022 03:11 PM    BACTERIA 4+ 07/03/2022 03:11 PM    RBCUA 0-2 07/03/2022 03:11 PM    BLOODU Negative 07/03/2022 03:11 PM    SPECGRAV 1.022 07/03/2022 03:11 PM    GLUCOSEU Negative 07/03/2022 03:11 PM       Radiology:  XR CHEST PORTABLE   Preliminary Result   Severe right upper lobe pneumonia, increased since the prior exam.         XR CHEST PORTABLE   Final Result   Diffuse asymmetric right-sided airspace disease, greatest in the perihilar   region, which may reflect pneumonia or asymmetric pulmonary edema.                  Assessment/Plan:    Active Hospital Problems    Diagnosis     CAP (community acquired pneumonia) [J18.9]      Priority: Medium    General weakness [R53.1]      Priority: Medium    Elevated lactic acid level [R79.89]      Priority: Medium    Diabetes ([address] [E11.9]     COPD exacerbation (HCC) [J44.1]     Severe sepsis ([facility] [A41.9, R65.20]     ROLY (acute kidney injury) ([facility] [N17.9]      1.  Severe sepsis,

## 2022-07-05 NOTE — PLAN OF CARE
Problem: Discharge Planning  Goal: Discharge to home or other facility with appropriate resources  Outcome: Progressing  Flowsheets (Taken 7/4/2022 1958)  Discharge to home or other facility with appropriate resources:   Identify barriers to discharge with patient and caregiver   Refer to discharge planning if patient needs post-hospital services based on physician order or complex needs related to functional status, cognitive ability or social support system     Problem: Pain  Goal: Verbalizes/displays adequate comfort level or baseline comfort level  Outcome: Progressing     Problem: Skin/Tissue Integrity  Goal: Absence of new skin breakdown  Description: 1. Monitor for areas of redness and/or skin breakdown  2. Assess vascular access sites hourly  3. Every 4-6 hours minimum:  Change oxygen saturation probe site  4. Every 4-6 hours:  If on nasal continuous positive airway pressure, respiratory therapy assess nares and determine need for appliance change or resting period.   Outcome: Progressing     Problem: Safety - Adult  Goal: Free from fall injury  Outcome: Progressing  Flowsheets (Taken 7/4/2022 1954)  Free From Fall Injury: Instruct family/caregiver on patient safety     Problem: ABCDS Injury Assessment  Goal: Absence of physical injury  Outcome: Progressing  Flowsheets (Taken 7/4/2022 1954)  Absence of Physical Injury: Implement safety measures based on patient assessment

## 2022-07-05 NOTE — PROGRESS NOTES
Pulmonary Progress Note    CC:  Follow up pneumonia, COPD    Subjective:  HF oxygen at 11 liters  Used bilevel last night due to confusion   Thinks she is doing ok with breathing. Not a lot of mucus yet    ROS  Slightly less SOB      Intake/Output Summary (Last 24 hours) at 7/5/2022 1205  Last data filed at 7/5/2022 1009  Gross per 24 hour   Intake 3531.59 ml   Output --   Net 3531.59 ml         PHYSICAL EXAM:  Blood pressure (!) 143/76, pulse (!) 102, temperature 97.7 °F (36.5 °C), temperature source Oral, resp. rate 22, height 5' 4\" (1.626 m), weight 134 lb 14.7 oz (61.2 kg), SpO2 98 %, not currently breastfeeding.'  Gen: No distress. Eyes: PERRL. No sclera icterus. No conjunctival injection. ENT: No discharge. Pharynx clear. External appearance of ears and nose normal.  Neck: Trachea midline. No obvious mass. Resp: Rhonchi, crackles, diminished   CV: Regular rate. Regular rhythm. No murmur or rub. GI: Non-tender. Non-distended. No hernia. Skin: Warm, dry, normal texture and turgor. No nodule on exposed extremities. Lymph: No cervical LAD. No supraclavicular LAD. M/S: No cyanosis. No clubbing. No joint deformity. Neuro: Moves all four extremities. CN 2-12 tested, no defect noted.   Ext:   no edema    Medications:    Scheduled Meds:   metroNIDAZOLE  500 mg IntraVENous Q8H    magnesium sulfate  4,000 mg IntraVENous Once    potassium phosphate IVPB  20 mmol IntraVENous Once    budesonide  0.5 mg Nebulization BID    lactobacillus  2 capsule Oral BID    heparin (porcine)  5,000 Units SubCUTAneous 3 times per day    cefepime  1,000 mg IntraVENous Q12H    DULoxetine  60 mg Oral Daily    traZODone  25 mg Oral Nightly    sodium chloride flush  5-40 mL IntraVENous 2 times per day    methylPREDNISolone  40 mg IntraVENous Q12H    ipratropium-albuterol  1 ampule Inhalation Q4H WA    insulin lispro  0-6 Units SubCUTAneous TID WC    insulin lispro  0-3 Units SubCUTAneous Nightly    azithromycin 500 mg IntraVENous Q24H    atorvastatin  40 mg Oral Daily    gabapentin  100 mg Oral BID    pantoprazole  40 mg Oral Daily       Continuous Infusions:   sodium chloride      dextrose         PRN Meds:  oxyCODONE-acetaminophen, sodium chloride flush, sodium chloride, ondansetron **OR** ondansetron, polyethylene glycol, acetaminophen **OR** acetaminophen, glucose, dextrose bolus **OR** dextrose bolus, glucagon (rDNA), dextrose, albuterol sulfate HFA    Labs:  CBC:   Recent Labs     22  1511 22  1511 22  0419 22  0458 22  0926   WBC 7.5  --  7.0  --  8.3   HGB 14.6   < > 14.9 15.1 11.3*   HCT 42.4  --  44.7  --  32.0*   MCV 95.9  --  97.7  --  93.7     --  273  --  197    < > = values in this interval not displayed. BMP:   Recent Labs     22  0419 22  1441 22  0722 22  0926    138 136  --    K 3.5 3.3* 3.5  --     104 99  --    CO2 16* 17* 22  --    PHOS  --   --   --  1.8*   BUN 53* 46* 35*  --    CREATININE 2.2* 1.5* 1.0  --      LIVER PROFILE:   Recent Labs     22   AST 18   ALT 11   BILITOT 0.6   ALKPHOS 58     PT/INR: No results for input(s): PROTIME, INR in the last 72 hours. APTT: No results for input(s): APTT in the last 72 hours. UA:  Recent Labs     22  151   COLORU DARK YELLOW*   PHUR 5.0   WBCUA 3-5   RBCUA 0-2   MUCUS 3+*   BACTERIA 4+*   CLARITYU TURBID*   SPECGRAV 1.022   LEUKOCYTESUR TRACE*   UROBILINOGEN 1.0   BILIRUBINUR MODERATE*   BLOODU Negative   GLUCOSEU Negative   AMORPHOUS 2+     No results for input(s): PH, PCO2, PO2 in the last 72 hours.         Films:  Chest imaging reports were reviewed and imaging was reviewed by me and showed RUL opacity with underlying chronic lung disease     AB.27/52/57    Cultures:  Sputum:  Pending  Antigens:  Negative    I reviewed the labs and images listed above    Assessment/Plan:    Acute Hypoxic Respiratory Failure with saturations less than 90% on room air  Titrate oxygen for saturations greater than or equal to 90%  o PRN Bilevel    Acute on Chronic Hypercapnic Respiratory Failure   o PRN Bilevel    Severe Sepsis  o Continue with cefepime and azithromycin  o Add flagyl   o Continue Lactobacillus    RUL pneumonia  o See above    Moderately Severe COPD   o Pulmiocort nebs  o Duonebs q 4 hours  o IV solumedrol    ROLY  o DC HCO3 gtt   Tobacco Abuse    DVT prophylaxis  Heparin       Rosalie Olsen, DO  [facility]

## 2022-07-05 NOTE — PROGRESS NOTES
Physical Therapy  Facility/Department: [address] ICU  Physical Therapy Treatment Note    Name: Hugh Delgado  : 1945  MRN: [id number]  Date of Service: 2022    Discharge Recommendations:  Continue to assess pending progress (Currently requiring high levels of O2.)   PT Equipment Recommendations  Other: Will monitor for potential equipt needs. Current Am-Pac Score 16/24. Assessment   Body Structures, Functions, Activity Limitations Requiring Skilled Therapeutic Intervention: Decreased functional mobility ; Decreased endurance  Assessment: 79 y/o female admit 7/3/2022 with COPD Exac, ROLY, UTI and Sepsis. PMH as noted including COPD, Osteoporosis, Fibromyalgia, B Carpal Tunnel Release. PTA pt living with sign other in apt setting with level entry/access; independent daily care and functional mobility (without assist device). Pt currently requiring O2 10L at rest prior PT arrival.  Desat noted to 80%, requiring O2 12L to recover sats to at least 90% (no home O2 needs pta); pt rell eob/oob to chair with Min assist.  No LOB although VERY  limited endurance. At this time pt reports adequate assist/support for d/c home; will monitor for potential home PT needs. Therapy Prognosis: Fair  History: 79 y/o female admit 7/3/2022 with COPD Exac, ROLY, UTI and Sepsis. PMH as noted including COPD, Osteoporosis, Fibromyalgia, B Carpal Tunnel Release. Exam: See above. Clinical Presentation: See above. Barriers to Learning: Endurance. Requires PT Follow-Up: Yes  Activity Tolerance  Activity Tolerance: Patient limited by endurance  Activity Tolerance Comments: Pt currently requiring O2 10L at rest prior PT arrival.  Desat noted to 80%, requiring O2 12L to recover sats to at least 90% (no home O2 needs pta); pt rell eob/oob to chair with Min assist.  No LOB although VERY  limited endurance.      Plan   Plan  Plan: 3-5 times per week  Current Treatment Recommendations: Functional mobility training,Transfer training,Gait training,Endurance training,Safety education & training,Patient/Caregiver education & training  Safety Devices  Type of Devices: Call light within reach,Chair alarm in place,Left in chair,Nurse notified     Restrictions  Restrictions/Precautions  Restrictions/Precautions: Fall Risk  Position Activity Restriction  Other position/activity restrictions: O2 10L via high flow, increased to 12L with activity d/t desating to 83% with transfer to chair. Following seated rest, sats remain 88-90% with 12L. Nursing aware. Subjective   General  Chart Reviewed: Yes  Patient assessed for rehabilitation services?: Yes  Additional Pertinent Hx: 77 y/o female admit 7/3/2022 with COPD Exac, ROLY, UTI and Sepsis. PMH as noted including COPD, Osteoporosis, Fibromyalgia, B Carpal Tunnel Release. Family / Caregiver Present: Yes (Sign other. Mak Toledo). )  Referring Practitioner: Dr. Jody Kurtz: Within Functional Limits  Subjective  Subjective: Pt agreeable to PT Rx. Social/Functional History  Social/Functional History  Lives With: Significant other (Sign other (Jimbo Vang). )  Type of Home: Apartment (1st floor.)  Home Layout: One level (Laundry in apt.)  Home Access: Level entry  Bathroom Shower/Tub: Tub/Shower unit  Bathroom Toilet: Standard  Bathroom Equipment: Shower chair  Bathroom Accessibility: Accessible  Home Equipment: Cane (No Home O2 pta.)  ADL Assistance: Independent  Homemaking Assistance:  (Shared with sign other.)  Ambulation Assistance: Independent (Without assist device pta.)  Transfer Assistance: Independent  Active : No (Sign other drives.)  Occupation: Retired  Additional Comments: Pt reports adequate assist/support upon return home.   Vision/Hearing  Vision  Vision: Within Functional Limits  Hearing  Hearing: Within functional limits    Cognition   Orientation  Overall Orientation Status: Within Functional Limits  Cognition  Overall Cognitive Status: WFL     Objective Bed mobility  Supine to Sit: Minimal assistance (HOB elevated. Use of bedrail.)  Transfers  Sit to Stand: Minimal Assistance  Stand to sit: Minimal Assistance  Ambulation  Surface: level tile  Distance: 4-5 steps pivot transfer to chair with Min assist.  No LE buckling/giving way. Very limited endurance. Comments: Desat to 83% despite O2 10L. O2 increase to 12L to recover sats to 90% (nursing aware). A/AROM Exercises: Glut Sets, Quad Sets, Ankle Pumps 10x each, B LEs. AM-PAC Score  AM-PAC Inpatient Mobility Raw Score : 16 (07/05/22 1446)  AM-PAC Inpatient T-Scale Score : 40.78 (07/05/22 1446)  Mobility Inpatient CMS 0-100% Score: 54.16 (07/05/22 1446)  Mobility Inpatient CMS G-Code Modifier : CK (07/05/22 1446)          Goals  Short Term Goals  Time Frame for Short term goals: Upon d/c acute care setting. Short term goal 1: Bed Mob Supervision. Short term goal 2: Transfers with/without assist device SBA. Short term goal 3: Amb with/without assist device 50' SBA. Patient Goals   Patient goals : Return home with sign other.        Education  Patient Education  Education Given To: Patient  Education Provided Comments: Role of PT, POC, Need to call for assist.  Education Method: Verbal  Barriers to Learning: None  Education Outcome: Verbalized understanding;Continued education needed      Therapy Time   Individual Concurrent Group Co-treatment   Time In 1300         Time Out 1330         Minutes [address], PT

## 2022-07-05 NOTE — PROGRESS NOTES
Handoff report received and patient assessment completed. A&O x4 though forgetful and anxious. ST per monitor with frequent multifocal PVC. Lung sounds with coarse rhonchi bilaterally. Moist congested cough present and pt expectorating thick brown, blood tinged sputum. Weaning O2 as tolerated. PRN tylenol given for c/o back pain. Incontinent of large amt of urine, tay care provided. POC discussed and all questions addressed. Will cont to monitor.

## 2022-07-05 NOTE — ACP (ADVANCE CARE PLANNING)
Advance Care Planning     Advance Care Planning Activator (Inpatient)  Conversation Note      Date of ACP Conversation: 7/5/2022     Conversation Conducted with:     Bravo Mariscal and Sariah Kruseeling her sign other    ACP Activator: [facility] Decision Maker:         Purpose of today's visit was to determine Decision Maker. Pt is too groggy for assessment. Current Designated Health Care Decision Maker:     Primary Decision Maker: Joycelyn [facility] - [phone]  Click here to complete Healthcare Decision Makers including section of the Healthcare Decision Maker Relationship (ie \"Primary\")      Care Preferences    Ventilation: \"If you were in your present state of health and suddenly became very ill and were unable to breathe on your own, what would your preference be about the use of a ventilator (breathing machine) if it were available to you? \"  Not addressed    Would the patient desire the use of ventilator (breathing machine)?:     \"If your health worsens and it becomes clear that your chance of recovery is unlikely, what would your preference be about the use of a ventilator (breathing machine) if it were available to you? \"    Not addressed    Would the patient desire the use of ventilator (breathing machine)?:       Resuscitation  \"CPR works best to restart the heart when there is a sudden event, like a heart attack, in someone who is otherwise healthy. Unfortunately, CPR does not typically restart the heart for people who have serious health conditions or who are very sick. \"    \"In the event your heart stopped as a result of an underlying serious health condition, would you want attempts to be made to restart your heart (answer \"yes\" for attempt to resuscitate) or would you prefer a natural death (answer \"no\" for do not attempt to resuscitate)? \"    Not Addressed       [] Yes   [] No   Educated Patient / Decision Maker regarding differences between Advance Directives and portable DNR orders.     Length of ACP Conversation in minutes:      Conversation Outcomes:  [] ACP discussion completed  [] Existing advance directive reviewed with patient; no changes to patient's previously recorded wishes  [] New Advance Directive completed  [] Portable Do Not Rescitate prepared for Provider review and signature  [] POLST/POST/MOLST/MOST prepared for Provider review and signature      Follow-up plan:    [] Schedule follow-up conversation to continue planning  [] Referred individual to Provider for additional questions/concerns   [] Advised patient/agent/surrogate to review completed ACP document and update if needed with changes in condition, patient preferences or care setting    [] This note routed to one or more involved healthcare providers  [facility]     Case Management   [phone]    7/5/2022  2:51 PM

## 2022-07-05 NOTE — PROGRESS NOTES
Occupational Therapy  Facility/Department: [facility] 4U ICU  Occupational Therapy Initial Assessment    Name: Janelle Hamilton  : 1945  MRN: [id number]  Date of Service: 2022    Discharge Recommendations:  Patient would benefit from continued therapy after discharge,3-5 sessions per week,Continue to assess pending progress  OT Equipment Recommendations  Other: will continue to assess     Janelle Hamilton scored a 16/24 on the AM-PAC ADL Inpatient form. Current research shows that an AM-PAC score of 17 or less is typically not associated with a discharge to the patient's home setting. Based on the patient's AM-PAC score and their current ADL deficits, it is recommended that the patient have 3-5 sessions per week of Occupational Therapy at d/c to increase the patient's independence. Please see assessment section for further patient specific details. If patient discharges prior to next session this note will serve as a discharge summary. Please see below for the latest assessment towards goals. Patient Diagnosis(es): The primary encounter diagnosis was Community acquired pneumonia of right lung, unspecified part of lung. Diagnoses of Urinary tract infection in female, Sepsis with acute renal failure without septic shock, due to unspecified organism, unspecified acute renal failure type ([address] Hypoxia, and Chronic obstructive pulmonary disease, unspecified COPD type ([address] were also pertinent to this visit. Past Medical History:  has a past medical history of Chronic pain syndrome, Colorectal polyps, COPD (chronic obstructive pulmonary disease) (HCC), CTS (carpal tunnel syndrome), DDD (degenerative disc disease), lumbar, Depression, Family hx of colon cancer, Fibromyalgia, Hyperlipemia, IBS (irritable bowel syndrome), Lumbar spondylosis, New onset type 2 diabetes mellitus ([address] and Urticaria, chronic.   Past Surgical History:  has a past surgical history that includes Tympanostomy tube placement; Cervical polyp removal (9/2004); Carpal tunnel release (Bilateral); Tubal ligation; Intracapsular cataract extraction (Left, 6/23/2020); and Intracapsular cataract extraction (Right, 7/14/2020). Assessment   Performance deficits / Impairments: Decreased functional mobility ; Decreased safe awareness;Decreased balance;Decreased ADL status; Decreased high-level IADLs;Decreased endurance;Decreased strength  Assessment: 77 y/o female admit 7/3/2022 with COPD Exac, ROLY, UTI and Sepsis. PMH as noted including COPD, Osteoporosis, Fibromyalgia, B Carpal Tunnel Release. PTA pt living with sign other in apt setting with level entry/access; independent daily care and functional mobility (without assist device). Today, pt desats with minimal activity. Pt required min A for bed mobility and completed stand pivot transfer to chair. Pt desat to 83% on 10L with activity and increased to 12L to recover to 90%. Pt with functional UE ROM for self care and anticipate will require up to max A for ADLs d/t low endurance. Pt is functioning below baseline and benefit from skilled therapy. At this time, recommend skilled placement prior to returning home, however if oxygen requirement decreases pt may be safe to return home. Prognosis: Good  Decision Making: Medium Complexity  REQUIRES OT FOLLOW-UP: Yes  Activity Tolerance  Activity Tolerance Comments: Pt desat to 83% on 10L with pivot transfer to chair, oxygen increased to 12L to recover to 90%.  (RN aware)        Plan   Plan  Times per Week: 3-5  Current Treatment Recommendations: Strengthening,Balance training,Functional mobility training,Endurance training,Self-Care / ADL,Safety education & training,Gait training     Restrictions  Restrictions/Precautions  Restrictions/Precautions: Fall Risk  Position Activity Restriction  Other position/activity restrictions: O2 10L via high flow, increased to 12L with activity d/t desating to 83% with transfer to chair    Subjective   General  Chart Reviewed: Yes  Patient assessed for rehabilitation services?: Yes  Additional Pertinent Hx: 79 y/o female admit 7/3/2022 with COPD Exac, ROLY, UTI and Sepsis. PMH as noted including COPD, Osteoporosis, Fibromyalgia, B Carpal Tunnel Release. Family / Caregiver Present: Yes  Referring Practitioner: Alyson Oshea MD  Subjective  Subjective: Pt seen bedside and agreeable to therapy. Pt denied pain. General Comment  Comments: Per RN ok for therapy. Social/Functional History  Social/Functional History  Lives With: Significant other (Sign other (Marcela Kunz). )  Type of Home: Apartment (1st floor.)  Home Layout: One level (Laundry in apt.)  Home Access: Level entry  Bathroom Shower/Tub: Tub/Shower unit  Bathroom Toilet: Standard  Bathroom Equipment: Shower chair  Bathroom Accessibility: Accessible  Home Equipment: Cane (No Home O2 pta.)  ADL Assistance: Independent  Homemaking Assistance:  (Shared with sign other.)  Ambulation Assistance: Independent (Without assist device pta.)  Transfer Assistance: Independent  Active : No (Sign other drives.)  Occupation: Retired  Additional Comments: Pt reports adequate assist/support upon return home. Objective     Safety Devices  Type of Devices: Call light within reach; Chair alarm in place; Left in chair;Nurse notified        AROM: Within functional limits  Strength: Generally decreased, functional  Coordination: Within functional limits     ADL  Toileting: Dependent/Total (terrell)  Additional Comments: Anticipate pt will be max A for LB bathing/dressing; SBA for UB bathing/dressing and grooming when seated based on balance and endurance obseved        Bed mobility  Supine to Sit: Minimal assistance (HOB elevated, use of bed rail)  Sit to Supine:  (pt in chair at end of session)     Transfers  Sit to stand: Minimal assistance  Stand to sit: Minimal assistance  Transfer Comments: Pt completed stand pivot transfer bed > chair with min A.  Pt desat to 83% on 10L, oxygen increased to 12L to recover to 90%. (RN aware)     Cognition  Overall Cognitive Status: WFL  Orientation  Overall Orientation Status: Within Functional Limits                  Education Given To: Family; Patient  Education Provided: Role of Therapy;Plan of Care;Transfer Training  Education Method: Demonstration;Verbal  Barriers to Learning: None  Education Outcome: Verbalized understanding     LUE AROM (degrees)  LUE AROM : WFL  Left Hand AROM (degrees)  Left Hand AROM: WFL  RUE AROM (degrees)  RUE AROM : WFL  Right Hand AROM (degrees)  Right Hand AROM: [facility]    AM-PAC Score  AM-PAC Inpatient Daily Activity Raw Score: 16 (07/05/22 1407)  AM-PAC Inpatient ADL T-Scale Score : 35.96 (07/05/22 1407)  ADL Inpatient CMS 0-100% Score: 53.32 (07/05/22 1407)  ADL Inpatient CMS G-Code Modifier : CK (07/05/22 1407)    Goals  Short Term Goals  Time Frame for Short term goals: Prior to DC: Short Term Goal 1: Pt will complete ADL transfer/mobility with supervision  Short Term Goal 2: Pt will tolerate standing > 3 min for functional task with supervision  Short Term Goal 3: Pt will complete toileting with min A  Short Term Goal 4: Pt will complete LB Dressing with min A  Short Term Goal 5: Pt will complete UE exercises in all planes to inc strength/endurance for ADLs  Patient Goals   Patient goals : to return home       Therapy Time   Individual Concurrent Group Co-treatment   Time In 1300         Time Out 1330         Minutes 30         Timed Code Treatment Minutes: 15 Minutes     This note to serve as OT d/c summary if pt is d/c-ed prior to next therapy session.     Michelle Johnson OTR/L

## 2022-07-05 NOTE — PROGRESS NOTES
Weaned O2 to 9 L HFNC and patient was tolerating and resting well. However, patient pulled her NC off and was satting in the 60's%. Skin is diaphoretic and mottled. Pt confused. Coarse crackles bilaterally. Pt states she feels like she's \"tiring out. \" Unable to recover sats on 15L HFNC. NRB placed and still unable to recover. RT and MD called. Stat CXR ordered. ABG obtained per R radial artery. Awaiting hospitalist arrival to bedside.

## 2022-07-06 LAB
ANION GAP SERPL CALCULATED.3IONS-SCNC: 19 MMOL/L (ref 3–16)
ANISOCYTOSIS: ABNORMAL
BANDED NEUTROPHILS RELATIVE PERCENT: 17 % (ref 0–7)
BASOPHILS ABSOLUTE: 0 K/UL (ref 0–0.2)
BASOPHILS RELATIVE PERCENT: 0 %
BUN BLDV-MCNC: 29 MG/DL (ref 7–20)
CALCIUM SERPL-MCNC: 7.7 MG/DL (ref 8.3–10.6)
CHLORIDE BLD-SCNC: 96 MMOL/L (ref 99–110)
CO2: 24 MMOL/L (ref 21–32)
CREAT SERPL-MCNC: 0.8 MG/DL (ref 0.6–1.2)
CULTURE, RESPIRATORY: ABNORMAL
CULTURE, RESPIRATORY: ABNORMAL
EOSINOPHILS ABSOLUTE: 0 K/UL (ref 0–0.6)
EOSINOPHILS RELATIVE PERCENT: 0 %
GFR AFRICAN AMERICAN: >60
GFR NON-AFRICAN AMERICAN: >60
GLUCOSE BLD-MCNC: 176 MG/DL (ref 70–99)
GLUCOSE BLD-MCNC: 179 MG/DL (ref 70–99)
GLUCOSE BLD-MCNC: 182 MG/DL (ref 70–99)
GLUCOSE BLD-MCNC: 217 MG/DL (ref 70–99)
GLUCOSE BLD-MCNC: 260 MG/DL (ref 70–99)
GRAM STAIN RESULT: ABNORMAL
HCT VFR BLD CALC: 34.1 % (ref 36–48)
HEMOGLOBIN: 11.5 G/DL (ref 12–16)
LACTIC ACID: 1.6 MMOL/L (ref 0.4–2)
LYMPHOCYTES ABSOLUTE: 0.3 K/UL (ref 1–5.1)
LYMPHOCYTES RELATIVE PERCENT: 2 %
MAGNESIUM: 2.8 MG/DL (ref 1.8–2.4)
MCH RBC QN AUTO: 32 PG (ref 26–34)
MCHC RBC AUTO-ENTMCNC: 33.7 G/DL (ref 31–36)
MCV RBC AUTO: 95.1 FL (ref 80–100)
METAMYELOCYTES RELATIVE PERCENT: 1 %
MONOCYTES ABSOLUTE: 0.8 K/UL (ref 0–1.3)
MONOCYTES RELATIVE PERCENT: 6 %
NEUTROPHILS ABSOLUTE: 12.2 K/UL (ref 1.7–7.7)
NEUTROPHILS RELATIVE PERCENT: 74 %
ORGANISM: ABNORMAL
PDW BLD-RTO: 14.3 % (ref 12.4–15.4)
PERFORMED ON: ABNORMAL
PHOSPHORUS: 1.9 MG/DL (ref 2.5–4.9)
PLATELET # BLD: 212 K/UL (ref 135–450)
PMV BLD AUTO: 8.4 FL (ref 5–10.5)
POIKILOCYTES: ABNORMAL
POTASSIUM SERPL-SCNC: 2.9 MMOL/L (ref 3.5–5.1)
PROCALCITONIN: 4.19 NG/ML (ref 0–0.15)
RBC # BLD: 3.59 M/UL (ref 4–5.2)
REPORT: NORMAL
SLIDE REVIEW: ABNORMAL
SODIUM BLD-SCNC: 139 MMOL/L (ref 136–145)
TEAR DROP CELLS: ABNORMAL
TOXIC GRANULATION: PRESENT
VACUOLATED NEUTROPHILS: PRESENT
WBC # BLD: 13.3 K/UL (ref 4–11)

## 2022-07-06 PROCEDURE — 6360000002 HC RX W HCPCS: Performed by: INTERNAL MEDICINE

## 2022-07-06 PROCEDURE — 84145 PROCALCITONIN (PCT): CPT

## 2022-07-06 PROCEDURE — 2500000003 HC RX 250 WO HCPCS: Performed by: NURSE PRACTITIONER

## 2022-07-06 PROCEDURE — 2700000000 HC OXYGEN THERAPY PER DAY

## 2022-07-06 PROCEDURE — 94760 N-INVAS EAR/PLS OXIMETRY 1: CPT

## 2022-07-06 PROCEDURE — 83735 ASSAY OF MAGNESIUM: CPT

## 2022-07-06 PROCEDURE — APPNB15 APP NON BILLABLE TIME 0-15 MINS: Performed by: NURSE PRACTITIONER

## 2022-07-06 PROCEDURE — 84100 ASSAY OF PHOSPHORUS: CPT

## 2022-07-06 PROCEDURE — 83605 ASSAY OF LACTIC ACID: CPT

## 2022-07-06 PROCEDURE — 80048 BASIC METABOLIC PNL TOTAL CA: CPT

## 2022-07-06 PROCEDURE — 36415 COLL VENOUS BLD VENIPUNCTURE: CPT

## 2022-07-06 PROCEDURE — 6370000000 HC RX 637 (ALT 250 FOR IP): Performed by: INTERNAL MEDICINE

## 2022-07-06 PROCEDURE — 99233 SBSQ HOSP IP/OBS HIGH 50: CPT | Performed by: INTERNAL MEDICINE

## 2022-07-06 PROCEDURE — 6360000002 HC RX W HCPCS: Performed by: NURSE PRACTITIONER

## 2022-07-06 PROCEDURE — 94669 MECHANICAL CHEST WALL OSCILL: CPT

## 2022-07-06 PROCEDURE — 2580000003 HC RX 258: Performed by: INTERNAL MEDICINE

## 2022-07-06 PROCEDURE — 2580000003 HC RX 258: Performed by: NURSE PRACTITIONER

## 2022-07-06 PROCEDURE — 85025 COMPLETE CBC W/AUTO DIFF WBC: CPT

## 2022-07-06 PROCEDURE — 94640 AIRWAY INHALATION TREATMENT: CPT

## 2022-07-06 PROCEDURE — 2000000000 HC ICU R&B

## 2022-07-06 RX ORDER — POTASSIUM CHLORIDE 7.45 MG/ML
10 INJECTION INTRAVENOUS
Status: COMPLETED | OUTPATIENT
Start: 2022-07-06 | End: 2022-07-06

## 2022-07-06 RX ORDER — CALCIUM GLUCONATE 20 MG/ML
1000 INJECTION, SOLUTION INTRAVENOUS ONCE
Status: COMPLETED | OUTPATIENT
Start: 2022-07-06 | End: 2022-07-06

## 2022-07-06 RX ORDER — FUROSEMIDE 10 MG/ML
40 INJECTION INTRAMUSCULAR; INTRAVENOUS ONCE
Status: COMPLETED | OUTPATIENT
Start: 2022-07-06 | End: 2022-07-06

## 2022-07-06 RX ADMIN — POTASSIUM PHOSPHATE, MONOBASIC AND POTASSIUM PHOSPHATE, DIBASIC 20 MMOL: 224; 236 INJECTION, SOLUTION, CONCENTRATE INTRAVENOUS at 13:45

## 2022-07-06 RX ADMIN — TRAZODONE HYDROCHLORIDE 25 MG: 50 TABLET ORAL at 21:13

## 2022-07-06 RX ADMIN — IPRATROPIUM BROMIDE AND ALBUTEROL SULFATE 1 AMPULE: .5; 3 SOLUTION RESPIRATORY (INHALATION) at 12:02

## 2022-07-06 RX ADMIN — METRONIDAZOLE 500 MG: 500 INJECTION, SOLUTION INTRAVENOUS at 18:58

## 2022-07-06 RX ADMIN — METHYLPREDNISOLONE SODIUM SUCCINATE 40 MG: 40 INJECTION, POWDER, FOR SOLUTION INTRAMUSCULAR; INTRAVENOUS at 21:05

## 2022-07-06 RX ADMIN — BUDESONIDE 500 MCG: 0.5 SUSPENSION RESPIRATORY (INHALATION) at 08:48

## 2022-07-06 RX ADMIN — OXYCODONE AND ACETAMINOPHEN 1 TABLET: 5; 325 TABLET ORAL at 16:20

## 2022-07-06 RX ADMIN — IPRATROPIUM BROMIDE AND ALBUTEROL SULFATE 1 AMPULE: .5; 3 SOLUTION RESPIRATORY (INHALATION) at 08:48

## 2022-07-06 RX ADMIN — CEFEPIME HYDROCHLORIDE 1000 MG: 1 INJECTION, POWDER, FOR SOLUTION INTRAMUSCULAR; INTRAVENOUS at 01:51

## 2022-07-06 RX ADMIN — IPRATROPIUM BROMIDE AND ALBUTEROL SULFATE 1 AMPULE: .5; 3 SOLUTION RESPIRATORY (INHALATION) at 20:20

## 2022-07-06 RX ADMIN — BUDESONIDE 500 MCG: 0.5 SUSPENSION RESPIRATORY (INHALATION) at 20:20

## 2022-07-06 RX ADMIN — METRONIDAZOLE 500 MG: 500 INJECTION, SOLUTION INTRAVENOUS at 11:12

## 2022-07-06 RX ADMIN — IPRATROPIUM BROMIDE AND ALBUTEROL SULFATE 1 AMPULE: .5; 3 SOLUTION RESPIRATORY (INHALATION) at 16:10

## 2022-07-06 RX ADMIN — OXYCODONE AND ACETAMINOPHEN 1 TABLET: 5; 325 TABLET ORAL at 08:02

## 2022-07-06 RX ADMIN — INSULIN LISPRO 2 UNITS: 100 INJECTION, SOLUTION INTRAVENOUS; SUBCUTANEOUS at 17:08

## 2022-07-06 RX ADMIN — HEPARIN SODIUM 5000 UNITS: 5000 INJECTION INTRAVENOUS; SUBCUTANEOUS at 21:05

## 2022-07-06 RX ADMIN — GABAPENTIN 100 MG: 100 CAPSULE ORAL at 21:06

## 2022-07-06 RX ADMIN — ATORVASTATIN CALCIUM 40 MG: 40 TABLET, FILM COATED ORAL at 08:00

## 2022-07-06 RX ADMIN — CEFEPIME HYDROCHLORIDE 2000 MG: 2 INJECTION, POWDER, FOR SOLUTION INTRAVENOUS at 14:47

## 2022-07-06 RX ADMIN — INSULIN LISPRO 1 UNITS: 100 INJECTION, SOLUTION INTRAVENOUS; SUBCUTANEOUS at 11:39

## 2022-07-06 RX ADMIN — Medication 10 MEQ: at 10:14

## 2022-07-06 RX ADMIN — Medication 10 MEQ: at 13:45

## 2022-07-06 RX ADMIN — Medication 2 CAPSULE: at 08:00

## 2022-07-06 RX ADMIN — Medication 2 CAPSULE: at 21:06

## 2022-07-06 RX ADMIN — GABAPENTIN 100 MG: 100 CAPSULE ORAL at 08:00

## 2022-07-06 RX ADMIN — FUROSEMIDE 40 MG: 10 INJECTION, SOLUTION INTRAMUSCULAR; INTRAVENOUS at 08:00

## 2022-07-06 RX ADMIN — PANTOPRAZOLE SODIUM 40 MG: 40 TABLET, DELAYED RELEASE ORAL at 08:00

## 2022-07-06 RX ADMIN — CALCIUM GLUCONATE 1000 MG: 20 INJECTION, SOLUTION INTRAVENOUS at 10:10

## 2022-07-06 RX ADMIN — METRONIDAZOLE 500 MG: 500 INJECTION, SOLUTION INTRAVENOUS at 03:16

## 2022-07-06 RX ADMIN — SODIUM CHLORIDE, PRESERVATIVE FREE 10 ML: 5 INJECTION INTRAVENOUS at 21:06

## 2022-07-06 RX ADMIN — DULOXETINE HYDROCHLORIDE 60 MG: 60 CAPSULE, DELAYED RELEASE ORAL at 08:00

## 2022-07-06 RX ADMIN — Medication 10 MEQ: at 12:14

## 2022-07-06 RX ADMIN — AZITHROMYCIN MONOHYDRATE 500 MG: 500 INJECTION, POWDER, LYOPHILIZED, FOR SOLUTION INTRAVENOUS at 17:57

## 2022-07-06 RX ADMIN — INSULIN LISPRO 1 UNITS: 100 INJECTION, SOLUTION INTRAVENOUS; SUBCUTANEOUS at 08:04

## 2022-07-06 RX ADMIN — SODIUM CHLORIDE, PRESERVATIVE FREE 10 ML: 5 INJECTION INTRAVENOUS at 08:52

## 2022-07-06 RX ADMIN — Medication 10 MEQ: at 11:16

## 2022-07-06 RX ADMIN — HEPARIN SODIUM 5000 UNITS: 5000 INJECTION INTRAVENOUS; SUBCUTANEOUS at 14:46

## 2022-07-06 RX ADMIN — HEPARIN SODIUM 5000 UNITS: 5000 INJECTION INTRAVENOUS; SUBCUTANEOUS at 06:33

## 2022-07-06 RX ADMIN — METHYLPREDNISOLONE SODIUM SUCCINATE 40 MG: 40 INJECTION, POWDER, FOR SOLUTION INTRAMUSCULAR; INTRAVENOUS at 08:00

## 2022-07-06 RX ADMIN — INSULIN LISPRO 2 UNITS: 100 INJECTION, SOLUTION INTRAVENOUS; SUBCUTANEOUS at 21:06

## 2022-07-06 ASSESSMENT — PAIN SCALES - GENERAL
PAINLEVEL_OUTOF10: 7
PAINLEVEL_OUTOF10: 8
PAINLEVEL_OUTOF10: 8
PAINLEVEL_OUTOF10: 0
PAINLEVEL_OUTOF10: 4
PAINLEVEL_OUTOF10: 0

## 2022-07-06 NOTE — PROGRESS NOTES
upper lobe due to infectious organism [J18.9]      Priority: Medium    General weakness [R53.1]      Priority: Medium    Elevated lactic acid level [R79.89]      Priority: Medium    Diabetes ([address] [E11.9]     COPD exacerbation (HCC) [J44.1]     Severe sepsis ([facility]) [A41.9, R65.20]     ROLY (acute kidney injury) ([address] [N17.9]     Acute on chronic respiratory failure with hypercapnia ([facility]) [J96.22]      1.  Severe sepsis, pneumonia, admitted to ICU for low blood pressure-        Continue present antibiotics        pulmonary status is tenuous        probably another 24 is in the ICU        continue cefepime Flagyl and Zithromax        off vancomycin    2.  Pneumonia, currently on cefepime and azithromycin/flagyl. Continue for now. 3.  Acute likely on chronic respiratory failure with hypoxia, had an episode of severe hypoxia this morning, improving now on high flow oxygen. Tenuous. 4.  COPD exacerbation, IV steroids, DuoNeb, antibiotics as above. 5.  Generalized weakness, due to above expecting improvement  6.  Diabetes mellitus, sliding scale  7.  Acute kidney injury, prerenal improving  8. Elevated lactic acid, trending this morning  9.  Tobacco abuse, counseled for quitting  10.  Reported hemoptysis,  improved. DVT Prophylaxis: Lovenox  Diet: ADULT DIET;  Regular; 4 carb choices (60 gm/meal)  Code Status: Full Code      Dispo -keep in ICU    Cristino Babb MD

## 2022-07-06 NOTE — PROGRESS NOTES
Patient took bipap off per patient RN 10-20 minutes after this RT placed bipap on patient.  Patient currently on 15 L HF SpO2 95%

## 2022-07-06 NOTE — PROGRESS NOTES
Throughout the night pt became intermittently confused and pulled off high flow nasal cannula multiple times. Pt educated about oxygen delivery device and importance of keeping oxygenation above 90%. Pt continuing to pull of high flow nasal cannula and desaturate into the 70s. Pt O2 saturations quickly recovering to above 90% when 15 Liters high flow nasal cannula reapplied to patient.

## 2022-07-06 NOTE — PLAN OF CARE
Problem: Discharge Planning  Goal: Discharge to home or other facility with appropriate resources  Outcome: Progressing  Flowsheets (Taken 7/5/2022 1949)  Discharge to home or other facility with appropriate resources: Identify barriers to discharge with patient and caregiver     Problem: Pain  Goal: Verbalizes/displays adequate comfort level or baseline comfort level  Outcome: Progressing  Flowsheets (Taken 7/5/2022 1949)  Verbalizes/displays adequate comfort level or baseline comfort level:   Encourage patient to monitor pain and request assistance   Assess pain using appropriate pain scale     Problem: Skin/Tissue Integrity  Goal: Absence of new skin breakdown  Description: 1. Monitor for areas of redness and/or skin breakdown  2. Assess vascular access sites hourly  3. Every 4-6 hours minimum:  Change oxygen saturation probe site  4. Every 4-6 hours:  If on nasal continuous positive airway pressure, respiratory therapy assess nares and determine need for appliance change or resting period.   Outcome: Progressing     Problem: Safety - Adult  Goal: Free from fall injury  Outcome: Progressing  Flowsheets (Taken 7/6/2022 0218)  Free From Fall Injury: Instruct family/caregiver on patient safety     Problem: ABCDS Injury Assessment  Goal: Absence of physical injury  Outcome: Progressing  Flowsheets (Taken 7/6/2022 0218)  Absence of Physical Injury: Implement safety measures based on patient assessment     Problem: Chronic Conditions and Co-morbidities  Goal: Patient's chronic conditions and co-morbidity symptoms are monitored and maintained or improved  Outcome: Progressing  Flowsheets (Taken 7/5/2022 1949)  Care Plan - Patient's Chronic Conditions and Co-Morbidity Symptoms are Monitored and Maintained or Improved: Monitor and assess patient's chronic conditions and comorbid symptoms for stability, deterioration, or improvement

## 2022-07-06 NOTE — PROGRESS NOTES
Physician Progress Note      Rochelle Cast  CSN #:                  [id number]  :                       1945  ADMIT DATE:       7/3/2022 2:42 PM  [address] [place] DATE:  RESPONDING  PROVIDER #:        CIRA KEBEDE MD          QUERY TEXT:    Pt admitted with pneumonia with sepsis. Sputum culture showing heavy growth   of pseudomonas. If possible, please document in the progress notes and   discharge summary if you are evaluating and/or treating any of the following:    Note: CAP and HCAP indicate where the pneumonia was acquired, not a specific   type. The medical record reflects the following:  Risk Factors: COPD, DM  Clinical Indicators: sputum culture shows heavy growth pseudomonas  Treatment: Maxipime, Zithromax    Thank you,  Janneth Chun RN, BSN, [email or web address]  Options provided:  -- Treating as gram negative pneumonia  -- Other - I will add my own diagnosis  -- Disagree - Not applicable / Not valid  -- Disagree - Clinically unable to determine / Unknown  -- Refer to Clinical Documentation Reviewer    PROVIDER RESPONSE TEXT:    Treating as gram negative pneumonia. Query created by: Miriam Jean on 2022 9:42 AM      QUERY TEXT:    Patient admitted with pneumonia with sepsis. UTI documented per ED provider. No further documentation of UTI. UA shows 4+ bacteria, 3-5 WBC, and trace of   leuko esterase. No culture needed per lab protocol. If possible, please   document in the progress notes and discharge summary if UTI was: The medical record reflects the following:  Risk Factors: 77 yo female, DM  Clinical Indicators: UA shows 4+ bacteria, 3-5 WBC, and trace of leuko   esterase; no culture needed per lab protocol  Treatment:  Maxipime, Zithromax    Thank you,  Janneth Chun RN, BSN, [email or web address]  Options provided:  -- UTI ruled out after study  -- UTI present on admission  -- Other - I will add my own diagnosis  -- Disagree - Not applicable / Not valid  -- Disagree - Clinically unable to determine / Unknown  -- Refer to Clinical Documentation Reviewer    PROVIDER RESPONSE TEXT:    UTI ruled out after study.     Query created by: Aron Austin on 7/6/2022 9:42 AM      Electronically signed by:  CIRA KEBEDE MD 7/6/2022 7:18 PM

## 2022-07-06 NOTE — PROGRESS NOTES
Pulmonary Progress Note    CC:  Follow up pneumonia, COPD    Subjective:  HF oxygen at 15 liters  Feels terrible and like she is dying  Starting to cough up more mucus with some blood tinge  Less SOB      ROS  Feels horrible   Coughing up more sputum      Intake/Output Summary (Last 24 hours) at 7/6/2022 1011  Last data filed at 7/6/2022 0947  Gross per 24 hour   Intake 180 ml   Output 2190 ml   Net -2010 ml         PHYSICAL EXAM:  Blood pressure 126/79, pulse 94, temperature 97.8 °F (36.6 °C), temperature source Oral, resp. rate 20, height 5' 4\" (1.626 m), weight 141 lb 1.5 oz (64 kg), SpO2 97 %, not currently breastfeeding.'  Gen: No distress. Eyes: PERRL. No sclera icterus. No conjunctival injection. ENT: No discharge. Pharynx clear. External appearance of ears and nose normal.  Neck: Trachea midline. No obvious mass. Resp: Right sided rhonchi, wheeze   CV: Regular rate. Regular rhythm. No murmur or rub. GI: Non-tender. Non-distended. No hernia. Skin: Warm, dry, normal texture and turgor. No nodule on exposed extremities. Lymph: No cervical LAD. No supraclavicular LAD. M/S: No cyanosis. No clubbing. No joint deformity. Neuro: Moves all four extremities. CN 2-12 tested, no defect noted.   Ext:   no edema    Medications:    Scheduled Meds:   potassium chloride  10 mEq IntraVENous Q1H    cefepime  2,000 mg IntraVENous Q12H    calcium gluconate-NaCl  1,000 mg IntraVENous Once    potassium phosphate IVPB  20 mmol IntraVENous Once    metroNIDAZOLE  500 mg IntraVENous Q8H    budesonide  0.5 mg Nebulization BID    lactobacillus  2 capsule Oral BID    heparin (porcine)  5,000 Units SubCUTAneous 3 times per day    DULoxetine  60 mg Oral Daily    traZODone  25 mg Oral Nightly    sodium chloride flush  5-40 mL IntraVENous 2 times per day    methylPREDNISolone  40 mg IntraVENous Q12H    ipratropium-albuterol  1 ampule Inhalation Q4H WA    insulin lispro  0-6 Units SubCUTAneous TID    

## 2022-07-06 NOTE — PLAN OF CARE
Problem: Discharge Planning  Goal: Discharge to home or other facility with appropriate resources  7/6/2022 0727 by Jeremy Carlos RN  Outcome: Progressing  Flowsheets (Taken 7/6/2022 0700)  Discharge to home or other facility with appropriate resources: Identify barriers to discharge with patient and caregiver     Problem: Pain  Goal: Verbalizes/displays adequate comfort level or baseline comfort level  7/6/2022 0727 by Jeremy Carlos RN  Outcome: Progressing     Problem: Skin/Tissue Integrity  Goal: Absence of new skin breakdown  Description: 1. Monitor for areas of redness and/or skin breakdown  2. Assess vascular access sites hourly  3. Every 4-6 hours minimum:  Change oxygen saturation probe site  4. Every 4-6 hours:  If on nasal continuous positive airway pressure, respiratory therapy assess nares and determine need for appliance change or resting period.   7/6/2022 0727 by Jeremy Carlos RN  Outcome: Progressing     Problem: Safety - Adult  Goal: Free from fall injury  7/6/2022 0727 by Jeremy Carlos RN  Outcome: Progressing     Problem: ABCDS Injury Assessment  Goal: Absence of physical injury  7/6/2022 0727 by Jeremy Carlos RN  Outcome: Progressing     Problem: Chronic Conditions and Co-morbidities  Goal: Patient's chronic conditions and co-morbidity symptoms are monitored and maintained or improved  7/6/2022 0727 by Jeremy Carlos RN  Outcome: Progressing  Flowsheets (Taken 7/6/2022 0700)  Care Plan - Patient's Chronic Conditions and Co-Morbidity Symptoms are Monitored and Maintained or Improved: Monitor and assess patient's chronic conditions and comorbid symptoms for stability, deterioration, or improvement

## 2022-07-07 LAB
ANION GAP SERPL CALCULATED.3IONS-SCNC: 11 MMOL/L (ref 3–16)
BASOPHILS ABSOLUTE: 0 K/UL (ref 0–0.2)
BASOPHILS RELATIVE PERCENT: 0 %
BUN BLDV-MCNC: 20 MG/DL (ref 7–20)
CALCIUM SERPL-MCNC: 7.8 MG/DL (ref 8.3–10.6)
CHLORIDE BLD-SCNC: 97 MMOL/L (ref 99–110)
CO2: 30 MMOL/L (ref 21–32)
CREAT SERPL-MCNC: 0.7 MG/DL (ref 0.6–1.2)
CRENATED RBC'S: ABNORMAL
CULTURE, BLOOD 2: NORMAL
EOSINOPHILS ABSOLUTE: 0 K/UL (ref 0–0.6)
EOSINOPHILS RELATIVE PERCENT: 0 %
GFR AFRICAN AMERICAN: >60
GFR NON-AFRICAN AMERICAN: >60
GLUCOSE BLD-MCNC: 170 MG/DL (ref 70–99)
GLUCOSE BLD-MCNC: 174 MG/DL (ref 70–99)
GLUCOSE BLD-MCNC: 178 MG/DL (ref 70–99)
GLUCOSE BLD-MCNC: 181 MG/DL (ref 70–99)
GLUCOSE BLD-MCNC: 192 MG/DL (ref 70–99)
HCT VFR BLD CALC: 33.3 % (ref 36–48)
HEMOGLOBIN: 11.2 G/DL (ref 12–16)
LYMPHOCYTES ABSOLUTE: 0 K/UL (ref 1–5.1)
LYMPHOCYTES RELATIVE PERCENT: 0 %
MAGNESIUM: 2.4 MG/DL (ref 1.8–2.4)
MCH RBC QN AUTO: 31.9 PG (ref 26–34)
MCHC RBC AUTO-ENTMCNC: 33.8 G/DL (ref 31–36)
MCV RBC AUTO: 94.3 FL (ref 80–100)
MONOCYTES ABSOLUTE: 0.1 K/UL (ref 0–1.3)
MONOCYTES RELATIVE PERCENT: 1 %
MYELOCYTE PERCENT: 1 %
NEUTROPHILS ABSOLUTE: 13.6 K/UL (ref 1.7–7.7)
NEUTROPHILS RELATIVE PERCENT: 98 %
OVALOCYTES: ABNORMAL
PDW BLD-RTO: 14.6 % (ref 12.4–15.4)
PERFORMED ON: ABNORMAL
PHOSPHORUS: 2.3 MG/DL (ref 2.5–4.9)
PLATELET # BLD: 176 K/UL (ref 135–450)
PLATELET SLIDE REVIEW: ADEQUATE
PMV BLD AUTO: 9.3 FL (ref 5–10.5)
POTASSIUM SERPL-SCNC: 3.6 MMOL/L (ref 3.5–5.1)
PROCALCITONIN: 2.19 NG/ML (ref 0–0.15)
RBC # BLD: 3.53 M/UL (ref 4–5.2)
SLIDE REVIEW: ABNORMAL
SODIUM BLD-SCNC: 138 MMOL/L (ref 136–145)
TEAR DROP CELLS: ABNORMAL
TOXIC GRANULATION: PRESENT
WBC # BLD: 13.7 K/UL (ref 4–11)

## 2022-07-07 PROCEDURE — 36415 COLL VENOUS BLD VENIPUNCTURE: CPT

## 2022-07-07 PROCEDURE — 2060000000 HC ICU INTERMEDIATE R&B

## 2022-07-07 PROCEDURE — 94640 AIRWAY INHALATION TREATMENT: CPT

## 2022-07-07 PROCEDURE — 6360000002 HC RX W HCPCS: Performed by: INTERNAL MEDICINE

## 2022-07-07 PROCEDURE — 2580000003 HC RX 258: Performed by: INTERNAL MEDICINE

## 2022-07-07 PROCEDURE — 6370000000 HC RX 637 (ALT 250 FOR IP): Performed by: INTERNAL MEDICINE

## 2022-07-07 PROCEDURE — 2580000003 HC RX 258: Performed by: NURSE PRACTITIONER

## 2022-07-07 PROCEDURE — 2500000003 HC RX 250 WO HCPCS: Performed by: NURSE PRACTITIONER

## 2022-07-07 PROCEDURE — 94669 MECHANICAL CHEST WALL OSCILL: CPT

## 2022-07-07 PROCEDURE — APPNB15 APP NON BILLABLE TIME 0-15 MINS: Performed by: NURSE PRACTITIONER

## 2022-07-07 PROCEDURE — 84145 PROCALCITONIN (PCT): CPT

## 2022-07-07 PROCEDURE — 80048 BASIC METABOLIC PNL TOTAL CA: CPT

## 2022-07-07 PROCEDURE — 85025 COMPLETE CBC W/AUTO DIFF WBC: CPT

## 2022-07-07 PROCEDURE — 9990000010 HC NO CHARGE VISIT

## 2022-07-07 PROCEDURE — 94760 N-INVAS EAR/PLS OXIMETRY 1: CPT

## 2022-07-07 PROCEDURE — 84100 ASSAY OF PHOSPHORUS: CPT

## 2022-07-07 PROCEDURE — 2700000000 HC OXYGEN THERAPY PER DAY

## 2022-07-07 PROCEDURE — 99233 SBSQ HOSP IP/OBS HIGH 50: CPT | Performed by: INTERNAL MEDICINE

## 2022-07-07 PROCEDURE — 83735 ASSAY OF MAGNESIUM: CPT

## 2022-07-07 RX ADMIN — OXYCODONE AND ACETAMINOPHEN 1 TABLET: 5; 325 TABLET ORAL at 21:26

## 2022-07-07 RX ADMIN — INSULIN LISPRO 1 UNITS: 100 INJECTION, SOLUTION INTRAVENOUS; SUBCUTANEOUS at 08:18

## 2022-07-07 RX ADMIN — METRONIDAZOLE 500 MG: 500 INJECTION, SOLUTION INTRAVENOUS at 04:11

## 2022-07-07 RX ADMIN — ATORVASTATIN CALCIUM 40 MG: 40 TABLET, FILM COATED ORAL at 08:17

## 2022-07-07 RX ADMIN — METRONIDAZOLE 500 MG: 500 INJECTION, SOLUTION INTRAVENOUS at 11:46

## 2022-07-07 RX ADMIN — HEPARIN SODIUM 5000 UNITS: 5000 INJECTION INTRAVENOUS; SUBCUTANEOUS at 14:35

## 2022-07-07 RX ADMIN — CEFEPIME HYDROCHLORIDE 2000 MG: 2 INJECTION, POWDER, FOR SOLUTION INTRAVENOUS at 14:32

## 2022-07-07 RX ADMIN — AZITHROMYCIN MONOHYDRATE 500 MG: 500 INJECTION, POWDER, LYOPHILIZED, FOR SOLUTION INTRAVENOUS at 17:16

## 2022-07-07 RX ADMIN — DULOXETINE HYDROCHLORIDE 60 MG: 60 CAPSULE, DELAYED RELEASE ORAL at 08:17

## 2022-07-07 RX ADMIN — INSULIN LISPRO 1 UNITS: 100 INJECTION, SOLUTION INTRAVENOUS; SUBCUTANEOUS at 21:25

## 2022-07-07 RX ADMIN — IPRATROPIUM BROMIDE AND ALBUTEROL SULFATE 1 AMPULE: .5; 3 SOLUTION RESPIRATORY (INHALATION) at 15:42

## 2022-07-07 RX ADMIN — POTASSIUM PHOSPHATE, MONOBASIC AND POTASSIUM PHOSPHATE, DIBASIC 20 MMOL: 224; 236 INJECTION, SOLUTION, CONCENTRATE INTRAVENOUS at 12:51

## 2022-07-07 RX ADMIN — BUDESONIDE 500 MCG: 0.5 SUSPENSION RESPIRATORY (INHALATION) at 07:45

## 2022-07-07 RX ADMIN — Medication 2 CAPSULE: at 08:17

## 2022-07-07 RX ADMIN — PANTOPRAZOLE SODIUM 40 MG: 40 TABLET, DELAYED RELEASE ORAL at 08:17

## 2022-07-07 RX ADMIN — TRAZODONE HYDROCHLORIDE 25 MG: 50 TABLET ORAL at 21:26

## 2022-07-07 RX ADMIN — HEPARIN SODIUM 5000 UNITS: 5000 INJECTION INTRAVENOUS; SUBCUTANEOUS at 21:26

## 2022-07-07 RX ADMIN — GABAPENTIN 100 MG: 100 CAPSULE ORAL at 08:17

## 2022-07-07 RX ADMIN — GABAPENTIN 100 MG: 100 CAPSULE ORAL at 21:26

## 2022-07-07 RX ADMIN — SODIUM CHLORIDE, PRESERVATIVE FREE 10 ML: 5 INJECTION INTRAVENOUS at 08:17

## 2022-07-07 RX ADMIN — BUDESONIDE 500 MCG: 0.5 SUSPENSION RESPIRATORY (INHALATION) at 19:43

## 2022-07-07 RX ADMIN — INSULIN LISPRO 1 UNITS: 100 INJECTION, SOLUTION INTRAVENOUS; SUBCUTANEOUS at 11:47

## 2022-07-07 RX ADMIN — Medication 1 LOZENGE: at 18:50

## 2022-07-07 RX ADMIN — IPRATROPIUM BROMIDE AND ALBUTEROL SULFATE 1 AMPULE: .5; 3 SOLUTION RESPIRATORY (INHALATION) at 19:43

## 2022-07-07 RX ADMIN — METRONIDAZOLE 500 MG: 500 INJECTION, SOLUTION INTRAVENOUS at 18:52

## 2022-07-07 RX ADMIN — CEFEPIME HYDROCHLORIDE 2000 MG: 2 INJECTION, POWDER, FOR SOLUTION INTRAVENOUS at 04:13

## 2022-07-07 RX ADMIN — METHYLPREDNISOLONE SODIUM SUCCINATE 40 MG: 40 INJECTION, POWDER, FOR SOLUTION INTRAMUSCULAR; INTRAVENOUS at 07:58

## 2022-07-07 RX ADMIN — Medication 2 CAPSULE: at 21:26

## 2022-07-07 RX ADMIN — OXYCODONE AND ACETAMINOPHEN 1 TABLET: 5; 325 TABLET ORAL at 14:35

## 2022-07-07 RX ADMIN — IPRATROPIUM BROMIDE AND ALBUTEROL SULFATE 1 AMPULE: .5; 3 SOLUTION RESPIRATORY (INHALATION) at 11:56

## 2022-07-07 RX ADMIN — METHYLPREDNISOLONE SODIUM SUCCINATE 40 MG: 40 INJECTION, POWDER, FOR SOLUTION INTRAMUSCULAR; INTRAVENOUS at 21:26

## 2022-07-07 RX ADMIN — INSULIN LISPRO 1 UNITS: 100 INJECTION, SOLUTION INTRAVENOUS; SUBCUTANEOUS at 17:16

## 2022-07-07 RX ADMIN — IPRATROPIUM BROMIDE AND ALBUTEROL SULFATE 1 AMPULE: .5; 3 SOLUTION RESPIRATORY (INHALATION) at 07:45

## 2022-07-07 RX ADMIN — HEPARIN SODIUM 5000 UNITS: 5000 INJECTION INTRAVENOUS; SUBCUTANEOUS at 04:14

## 2022-07-07 ASSESSMENT — PAIN SCALES - GENERAL
PAINLEVEL_OUTOF10: 4
PAINLEVEL_OUTOF10: 4
PAINLEVEL_OUTOF10: 6

## 2022-07-07 NOTE — PROGRESS NOTES
Occupational Therapy    Pt to bedside and spoke with pt's nurse; pt recently return to bed with nursing assist.  Pt cont require O2 9L with mobility limited to bedside only. Will follow-up with cont OT tx tomorrow.     Electronically signed by Pedro Cota OT on 7/7/2022 at 2:00 PM

## 2022-07-07 NOTE — PLAN OF CARE
Problem: Discharge Planning  Goal: Discharge to home or other facility with appropriate resources  Outcome: Progressing  Flowsheets (Taken 7/7/2022 0700)  Discharge to home or other facility with appropriate resources: Identify barriers to discharge with patient and caregiver     Problem: Pain  Goal: Verbalizes/displays adequate comfort level or baseline comfort level  Outcome: Progressing     Problem: Skin/Tissue Integrity  Goal: Absence of new skin breakdown  Description: 1. Monitor for areas of redness and/or skin breakdown  2. Assess vascular access sites hourly  3. Every 4-6 hours minimum:  Change oxygen saturation probe site  4. Every 4-6 hours:  If on nasal continuous positive airway pressure, respiratory therapy assess nares and determine need for appliance change or resting period.   Outcome: Progressing     Problem: Safety - Adult  Goal: Free from fall injury  Outcome: Progressing     Problem: ABCDS Injury Assessment  Goal: Absence of physical injury  Outcome: Progressing     Problem: Chronic Conditions and Co-morbidities  Goal: Patient's chronic conditions and co-morbidity symptoms are monitored and maintained or improved  Outcome: Progressing  Flowsheets (Taken 7/7/2022 0700)  Care Plan - Patient's Chronic Conditions and Co-Morbidity Symptoms are Monitored and Maintained or Improved: Monitor and assess patient's chronic conditions and comorbid symptoms for stability, deterioration, or improvement

## 2022-07-07 NOTE — PROGRESS NOTES
Physical Therapy  Pt to bedside and spoke with pt's nurse; pt recently return to bed with nursing assist.  Pt cont require O2 9L with mobility limited to bedside only. Will follow-up with cont PT Rx tomorrow.   Rusty Mai, PT

## 2022-07-07 NOTE — PROGRESS NOTES
Skin color, texture, turgor normal.  No rashes or lesions. Neurologic: No Focal weakness  Psychiatric: Alert and oriented  Capillary Refill: Brisk,3 seconds, normal   Peripheral Pulses: +2 palpable, equal bilaterally       Labs:   Recent Labs     07/05/22 0926 07/06/22 0345 07/07/22 0422   WBC 8.3 13.3* 13.7*   HGB 11.3* 11.5* 11.2*   HCT 32.0* 34.1* 33.3*    212 176     Recent Labs     07/05/22 0722 07/05/22 0926 07/06/22 0345 07/07/22  0422     --  139 138   K 3.5  --  2.9* 3.6   CL 99  --  96* 97*   CO2 22  --  24 30   BUN 35*  --  29* 20   CREATININE 1.0  --  0.8 0.7   CALCIUM 7.8*  --  7.7* 7.8*   PHOS  --  1.8* 1.9* 2.3*     No results for input(s): AST, ALT, BILIDIR, BILITOT, ALKPHOS in the last 72 hours. No results for input(s): INR in the last 72 hours. No results for input(s): Rhae Childes in the last 72 hours. Urinalysis:      Lab Results   Component Value Date/Time    NITRU Negative 07/03/2022 03:11 PM    WBCUA 3-5 07/03/2022 03:11 PM    BACTERIA 4+ 07/03/2022 03:11 PM    RBCUA 0-2 07/03/2022 03:11 PM    BLOODU Negative 07/03/2022 03:11 PM    SPECGRAV 1.022 07/03/2022 03:11 PM    GLUCOSEU Negative 07/03/2022 03:11 PM       Radiology:  XR CHEST PORTABLE   Final Result   Severe right upper lobe pneumonia, increased since the prior exam.         XR CHEST PORTABLE   Final Result   Diffuse asymmetric right-sided airspace disease, greatest in the perihilar   region, which may reflect pneumonia or asymmetric pulmonary edema.                  Assessment/Plan:    Active Hospital Problems    Diagnosis     Acute respiratory failure with hypoxia ([facility] [address] [J96.01]      Priority: Medium    Community acquired pneumonia of right lung [J18.9]      Priority: Medium    Chronic obstructive pulmonary disease ([address] [J44.9]      Priority: Medium    Pneumonia of right upper lobe due to infectious organism [J18.9]      Priority: Medium    General weakness [R53.1]      Priority: Medium    Elevated lactic acid level [R79.89]      Priority: Medium    Diabetes ([facility] [address] [E11.9]     COPD exacerbation ([facility]) [J44.1]     Severe sepsis ([facility]) [A41.9, R65.20]     ROLY (acute kidney injury) ([facility] [address] [N17.9]     Acute on chronic respiratory failure with hypercapnia ([facility]) [J96.22]        Severe sepsis, pneumonia, admitted to ICU for low blood pressure-  Continue cefepime Flagyl and Zithromax    Pseudomonal pneumonia  Continue broad-spectrum antibiotics cefepime and Zithromax and Flagyl      COPD with exacerbation  Remains on moderate oxygen  At risk for intubation  On Solu-Medrol  DuoNebs    Diabetes mellitus type 2-  Continue Lantus and Humalog        DVT Prophylaxis: Lovenox  Diet: ADULT DIET;  Regular; 4 carb choices (60 gm/meal)  Code Status: Full Code      Dispo -another 24 to 48 hours of inpatient care predicted  Will likely need a higher level of care at discharge    Porfirio Pa MD

## 2022-07-07 NOTE — PROGRESS NOTES
Pulmonary Progress Note    CC:  Follow up pneumonia, COPD    Subjective:  HF oxygen at 10 liters  Feels terrible today  Coughing up a lot of mucus  Breathing so-so  Received lasix yesterday     ROS  Feels horrible   Coughing up more sputum      Intake/Output Summary (Last 24 hours) at 7/7/2022 1151  Last data filed at 7/7/2022 0414  Gross per 24 hour   Intake --   Output 850 ml   Net -850 ml         PHYSICAL EXAM:  Blood pressure (!) 141/65, pulse 96, temperature 98.3 °F (36.8 °C), temperature source Oral, resp. rate 21, height 5' 4\" (1.626 m), weight 139 lb 15.9 oz (63.5 kg), SpO2 92 %, not currently breastfeeding.'  Gen: No distress. Eyes: PERRL. No sclera icterus. No conjunctival injection. ENT: No discharge. Pharynx clear. External appearance of ears and nose normal.  Neck: Trachea midline. No obvious mass. Resp: Diminished, few rhonchi   CV: Regular rate. Regular rhythm. No murmur or rub. GI: Non-tender. Non-distended. No hernia. Skin: Warm, dry, normal texture and turgor. No nodule on exposed extremities. Lymph: No cervical LAD. No supraclavicular LAD. M/S: No cyanosis. No clubbing. No joint deformity. Neuro: Moves all four extremities. CN 2-12 tested, no defect noted.   Ext:   no edema    Medications:    Scheduled Meds:   potassium phosphate IVPB  20 mmol IntraVENous Once    cefepime  2,000 mg IntraVENous Q12H    metroNIDAZOLE  500 mg IntraVENous Q8H    budesonide  0.5 mg Nebulization BID    lactobacillus  2 capsule Oral BID    heparin (porcine)  5,000 Units SubCUTAneous 3 times per day    DULoxetine  60 mg Oral Daily    traZODone  25 mg Oral Nightly    sodium chloride flush  5-40 mL IntraVENous 2 times per day    methylPREDNISolone  40 mg IntraVENous Q12H    ipratropium-albuterol  1 ampule Inhalation Q4H WA    insulin lispro  0-6 Units SubCUTAneous TID WC    insulin lispro  0-3 Units SubCUTAneous Nightly    azithromycin  500 mg IntraVENous Q24H    atorvastatin  40 mg Oral Daily    gabapentin  100 mg Oral BID    pantoprazole  40 mg Oral Daily       Continuous Infusions:   sodium chloride      dextrose         PRN Meds:  oxyCODONE-acetaminophen, sodium chloride flush, sodium chloride, ondansetron **OR** ondansetron, polyethylene glycol, acetaminophen **OR** acetaminophen, glucose, dextrose bolus **OR** dextrose bolus, glucagon (rDNA), dextrose, albuterol sulfate HFA    Labs:  CBC:   Recent Labs     22   WBC 8.3 13.3* 13.7*   HGB 11.3* 11.5* 11.2*   HCT 32.0* 34.1* 33.3*   MCV 93.7 95.1 94.3    212 176     BMP:   Recent Labs     22     --  139 138   K 3.5  --  2.9* 3.6   CL 99  --  96* 97*   CO2 22  --  24 30   PHOS  --  1.8* 1.9* 2.3*   BUN 35*  --  29* 20   CREATININE 1.0  --  0.8 0.7     LIVER PROFILE:   No results for input(s): AST, ALT, LIPASE, BILIDIR, BILITOT, ALKPHOS in the last 72 hours. Invalid input(s): AMYLASE,  ALB  PT/INR: No results for input(s): PROTIME, INR in the last 72 hours. APTT: No results for input(s): APTT in the last 72 hours. UA:  No results for input(s): NITRITE, COLORU, PHUR, LABCAST, WBCUA, RBCUA, MUCUS, TRICHOMONAS, YEAST, BACTERIA, CLARITYU, SPECGRAV, LEUKOCYTESUR, UROBILINOGEN, BILIRUBINUR, BLOODU, GLUCOSEU, AMORPHOUS in the last 72 hours. Invalid input(s): Linda Nair  No results for input(s): PH, PCO2, PO2 in the last 72 hours.         Films:  Chest imaging reports were reviewed and imaging was reviewed by me and showed RUL opacity with underlying chronic lung disease     [id number]    Cultures:  Sputum:  Pending  Antigens:  Negative    I reviewed the labs and images listed above    Assessment/Plan:    Acute Hypoxic Respiratory Failure with saturations less than 90% on room air with oxygen requirements increasing   Titrate oxygen for saturations greater than or equal to 90%  o PRN Bilevel   o Home oxygen assessment prior to DC   Acute on Chronic Hypercapnic Respiratory Failure   o PRN Bilevel    Severe Sepsis due to pseudomonas   o Continue with cefepime, azithromycin and flagyl. I would continue azithromycin for the anti-inflammatory properties. Flagyl for anaerobes and anaerobes will not show up on routine cultures  o Continue Lactobacillus   o Procal trend   RUL pneumonia:  Pseudomonas   o See above.   Probably will need 14 day course of pseudomonas coverage    Moderately Severe COPD   o Pulmiocort nebs  o Duonebs q 4 hours  o IV solumedrol q 12 hours    ROLY, resolved    Tobacco Abuse    DVT prophylaxis  Heparin     PT/OT    Lyle Crisostomo, DO  [facility]

## 2022-07-08 ENCOUNTER — APPOINTMENT (OUTPATIENT)
Dept: GENERAL RADIOLOGY | Age: 77
DRG: 871 | End: 2022-07-08
Payer: MEDICARE

## 2022-07-08 LAB
ANION GAP SERPL CALCULATED.3IONS-SCNC: 10 MMOL/L (ref 3–16)
BANDED NEUTROPHILS RELATIVE PERCENT: 1 % (ref 0–7)
BASOPHILS ABSOLUTE: 0 K/UL (ref 0–0.2)
BASOPHILS RELATIVE PERCENT: 0 %
BLOOD CULTURE, ROUTINE: ABNORMAL
BLOOD CULTURE, ROUTINE: ABNORMAL
BUN BLDV-MCNC: 16 MG/DL (ref 7–20)
CALCIUM SERPL-MCNC: 7.6 MG/DL (ref 8.3–10.6)
CHLORIDE BLD-SCNC: 96 MMOL/L (ref 99–110)
CO2: 30 MMOL/L (ref 21–32)
CREAT SERPL-MCNC: 0.6 MG/DL (ref 0.6–1.2)
EOSINOPHILS ABSOLUTE: 0 K/UL (ref 0–0.6)
EOSINOPHILS RELATIVE PERCENT: 0 %
GFR AFRICAN AMERICAN: >60
GFR NON-AFRICAN AMERICAN: >60
GLUCOSE BLD-MCNC: 108 MG/DL (ref 70–99)
GLUCOSE BLD-MCNC: 123 MG/DL (ref 70–99)
GLUCOSE BLD-MCNC: 133 MG/DL (ref 70–99)
GLUCOSE BLD-MCNC: 134 MG/DL (ref 70–99)
GLUCOSE BLD-MCNC: 150 MG/DL (ref 70–99)
HCT VFR BLD CALC: 34 % (ref 36–48)
HEMOGLOBIN: 11.6 G/DL (ref 12–16)
LYMPHOCYTES ABSOLUTE: 0.4 K/UL (ref 1–5.1)
LYMPHOCYTES RELATIVE PERCENT: 3 %
MAGNESIUM: 2.1 MG/DL (ref 1.8–2.4)
MCH RBC QN AUTO: 32.4 PG (ref 26–34)
MCHC RBC AUTO-ENTMCNC: 34.1 G/DL (ref 31–36)
MCV RBC AUTO: 95 FL (ref 80–100)
METAMYELOCYTES RELATIVE PERCENT: 1 %
MONOCYTES ABSOLUTE: 0.4 K/UL (ref 0–1.3)
MONOCYTES RELATIVE PERCENT: 3 %
MYELOCYTE PERCENT: 1 %
NEUTROPHILS ABSOLUTE: 13.9 K/UL (ref 1.7–7.7)
NEUTROPHILS RELATIVE PERCENT: 91 %
ORGANISM: ABNORMAL
PDW BLD-RTO: 14.3 % (ref 12.4–15.4)
PERFORMED ON: ABNORMAL
PHOSPHORUS: 2.2 MG/DL (ref 2.5–4.9)
PLATELET # BLD: 200 K/UL (ref 135–450)
PMV BLD AUTO: 9.3 FL (ref 5–10.5)
POTASSIUM SERPL-SCNC: 3.6 MMOL/L (ref 3.5–5.1)
RBC # BLD: 3.58 M/UL (ref 4–5.2)
RBC # BLD: NORMAL 10*6/UL
SODIUM BLD-SCNC: 136 MMOL/L (ref 136–145)
WBC # BLD: 14.8 K/UL (ref 4–11)

## 2022-07-08 PROCEDURE — 6360000002 HC RX W HCPCS: Performed by: INTERNAL MEDICINE

## 2022-07-08 PROCEDURE — 2700000000 HC OXYGEN THERAPY PER DAY

## 2022-07-08 PROCEDURE — 6370000000 HC RX 637 (ALT 250 FOR IP): Performed by: INTERNAL MEDICINE

## 2022-07-08 PROCEDURE — 97530 THERAPEUTIC ACTIVITIES: CPT

## 2022-07-08 PROCEDURE — 94669 MECHANICAL CHEST WALL OSCILL: CPT

## 2022-07-08 PROCEDURE — 2500000003 HC RX 250 WO HCPCS: Performed by: NURSE PRACTITIONER

## 2022-07-08 PROCEDURE — 94761 N-INVAS EAR/PLS OXIMETRY MLT: CPT

## 2022-07-08 PROCEDURE — 99233 SBSQ HOSP IP/OBS HIGH 50: CPT | Performed by: INTERNAL MEDICINE

## 2022-07-08 PROCEDURE — 85025 COMPLETE CBC W/AUTO DIFF WBC: CPT

## 2022-07-08 PROCEDURE — 97110 THERAPEUTIC EXERCISES: CPT

## 2022-07-08 PROCEDURE — 71045 X-RAY EXAM CHEST 1 VIEW: CPT

## 2022-07-08 PROCEDURE — 2580000003 HC RX 258: Performed by: INTERNAL MEDICINE

## 2022-07-08 PROCEDURE — 94640 AIRWAY INHALATION TREATMENT: CPT

## 2022-07-08 PROCEDURE — 80048 BASIC METABOLIC PNL TOTAL CA: CPT

## 2022-07-08 PROCEDURE — 84100 ASSAY OF PHOSPHORUS: CPT

## 2022-07-08 PROCEDURE — 83735 ASSAY OF MAGNESIUM: CPT

## 2022-07-08 PROCEDURE — 36415 COLL VENOUS BLD VENIPUNCTURE: CPT

## 2022-07-08 PROCEDURE — 2060000000 HC ICU INTERMEDIATE R&B

## 2022-07-08 RX ORDER — LEVOFLOXACIN 500 MG/1
750 TABLET, FILM COATED ORAL DAILY
Status: DISCONTINUED | OUTPATIENT
Start: 2022-07-08 | End: 2022-07-13 | Stop reason: HOSPADM

## 2022-07-08 RX ORDER — FUROSEMIDE 10 MG/ML
40 INJECTION INTRAMUSCULAR; INTRAVENOUS ONCE
Status: COMPLETED | OUTPATIENT
Start: 2022-07-08 | End: 2022-07-08

## 2022-07-08 RX ADMIN — GABAPENTIN 100 MG: 100 CAPSULE ORAL at 07:56

## 2022-07-08 RX ADMIN — TRAZODONE HYDROCHLORIDE 25 MG: 50 TABLET ORAL at 19:29

## 2022-07-08 RX ADMIN — Medication 2 CAPSULE: at 07:56

## 2022-07-08 RX ADMIN — HEPARIN SODIUM 5000 UNITS: 5000 INJECTION INTRAVENOUS; SUBCUTANEOUS at 21:22

## 2022-07-08 RX ADMIN — FUROSEMIDE 40 MG: 10 INJECTION, SOLUTION INTRAMUSCULAR; INTRAVENOUS at 07:58

## 2022-07-08 RX ADMIN — HEPARIN SODIUM 5000 UNITS: 5000 INJECTION INTRAVENOUS; SUBCUTANEOUS at 14:43

## 2022-07-08 RX ADMIN — METHYLPREDNISOLONE SODIUM SUCCINATE 40 MG: 40 INJECTION, POWDER, FOR SOLUTION INTRAMUSCULAR; INTRAVENOUS at 07:57

## 2022-07-08 RX ADMIN — LEVOFLOXACIN 750 MG: 500 TABLET, FILM COATED ORAL at 10:56

## 2022-07-08 RX ADMIN — METRONIDAZOLE 500 MG: 500 INJECTION, SOLUTION INTRAVENOUS at 05:31

## 2022-07-08 RX ADMIN — IPRATROPIUM BROMIDE AND ALBUTEROL SULFATE 1 AMPULE: .5; 3 SOLUTION RESPIRATORY (INHALATION) at 07:36

## 2022-07-08 RX ADMIN — METHYLPREDNISOLONE SODIUM SUCCINATE 40 MG: 40 INJECTION, POWDER, FOR SOLUTION INTRAMUSCULAR; INTRAVENOUS at 19:29

## 2022-07-08 RX ADMIN — ATORVASTATIN CALCIUM 40 MG: 40 TABLET, FILM COATED ORAL at 07:56

## 2022-07-08 RX ADMIN — DULOXETINE HYDROCHLORIDE 60 MG: 60 CAPSULE, DELAYED RELEASE ORAL at 07:56

## 2022-07-08 RX ADMIN — IPRATROPIUM BROMIDE AND ALBUTEROL SULFATE 1 AMPULE: .5; 3 SOLUTION RESPIRATORY (INHALATION) at 11:29

## 2022-07-08 RX ADMIN — INSULIN LISPRO 1 UNITS: 100 INJECTION, SOLUTION INTRAVENOUS; SUBCUTANEOUS at 12:27

## 2022-07-08 RX ADMIN — Medication 2 CAPSULE: at 19:29

## 2022-07-08 RX ADMIN — SODIUM CHLORIDE, PRESERVATIVE FREE 10 ML: 5 INJECTION INTRAVENOUS at 19:25

## 2022-07-08 RX ADMIN — BUDESONIDE 500 MCG: 0.5 SUSPENSION RESPIRATORY (INHALATION) at 07:36

## 2022-07-08 RX ADMIN — PANTOPRAZOLE SODIUM 40 MG: 40 TABLET, DELAYED RELEASE ORAL at 07:56

## 2022-07-08 RX ADMIN — GABAPENTIN 100 MG: 100 CAPSULE ORAL at 19:29

## 2022-07-08 RX ADMIN — HEPARIN SODIUM 5000 UNITS: 5000 INJECTION INTRAVENOUS; SUBCUTANEOUS at 05:29

## 2022-07-08 RX ADMIN — IPRATROPIUM BROMIDE AND ALBUTEROL SULFATE 1 AMPULE: .5; 3 SOLUTION RESPIRATORY (INHALATION) at 16:39

## 2022-07-08 RX ADMIN — Medication 1 LOZENGE: at 01:14

## 2022-07-08 RX ADMIN — OXYCODONE AND ACETAMINOPHEN 1 TABLET: 5; 325 TABLET ORAL at 14:48

## 2022-07-08 RX ADMIN — OXYCODONE AND ACETAMINOPHEN 1 TABLET: 5; 325 TABLET ORAL at 05:28

## 2022-07-08 RX ADMIN — BUDESONIDE 500 MCG: 0.5 SUSPENSION RESPIRATORY (INHALATION) at 20:04

## 2022-07-08 RX ADMIN — CEFEPIME HYDROCHLORIDE 2000 MG: 2 INJECTION, POWDER, FOR SOLUTION INTRAVENOUS at 01:17

## 2022-07-08 RX ADMIN — SODIUM CHLORIDE, PRESERVATIVE FREE 10 ML: 5 INJECTION INTRAVENOUS at 07:57

## 2022-07-08 RX ADMIN — IPRATROPIUM BROMIDE AND ALBUTEROL SULFATE 1 AMPULE: .5; 3 SOLUTION RESPIRATORY (INHALATION) at 20:04

## 2022-07-08 ASSESSMENT — PAIN SCALES - GENERAL
PAINLEVEL_OUTOF10: 9
PAINLEVEL_OUTOF10: 0
PAINLEVEL_OUTOF10: 0
PAINLEVEL_OUTOF10: 3
PAINLEVEL_OUTOF10: 0
PAINLEVEL_OUTOF10: 8

## 2022-07-08 ASSESSMENT — PAIN - FUNCTIONAL ASSESSMENT: PAIN_FUNCTIONAL_ASSESSMENT: PREVENTS OR INTERFERES SOME ACTIVE ACTIVITIES AND ADLS

## 2022-07-08 ASSESSMENT — PAIN DESCRIPTION - FREQUENCY: FREQUENCY: CONTINUOUS

## 2022-07-08 ASSESSMENT — PAIN DESCRIPTION - ONSET: ONSET: ON-GOING

## 2022-07-08 ASSESSMENT — PAIN DESCRIPTION - PAIN TYPE: TYPE: CHRONIC PAIN

## 2022-07-08 ASSESSMENT — PAIN DESCRIPTION - DESCRIPTORS: DESCRIPTORS: ACHING;DISCOMFORT

## 2022-07-08 ASSESSMENT — PAIN DESCRIPTION - ORIENTATION: ORIENTATION: LOWER

## 2022-07-08 ASSESSMENT — PAIN DESCRIPTION - LOCATION: LOCATION: BACK

## 2022-07-08 NOTE — PROGRESS NOTES
Occupational Therapy  Facility/Department: [address] ICU  Occupational Therapy Initial Assessment    Name: Юлия Enciso  : 1945  MRN: [id number]  Date of Service: 2022    Discharge Recommendations:  Patient would benefit from continued therapy after discharge,3-5 sessions per week,Continue to assess pending progress  OT Equipment Recommendations  Other: will continue to assess     Юлия Enciso scored a 16/24 on the AM-PAC ADL Inpatient form. Current research shows that an AM-PAC score of 17 or less is typically not associated with a discharge to the patient's home setting. Based on the patient's AM-PAC score and their current ADL deficits, it is recommended that the patient have 3-5 sessions per week of Occupational Therapy at d/c to increase the patient's independence. Please see assessment section for further patient specific details. If patient discharges prior to next session this note will serve as a discharge summary. Please see below for the latest assessment towards goals. Patient Diagnosis(es): The primary encounter diagnosis was Community acquired pneumonia of right lung, unspecified part of lung. Diagnoses of Urinary tract infection in female, Sepsis with acute renal failure without septic shock, due to unspecified organism, unspecified acute renal failure type ([address] Hypoxia, and Chronic obstructive pulmonary disease, unspecified COPD type ([address] were also pertinent to this visit. Past Medical History:  has a past medical history of Chronic pain syndrome, Colorectal polyps, COPD (chronic obstructive pulmonary disease) (HCC), CTS (carpal tunnel syndrome), DDD (degenerative disc disease), lumbar, Depression, Family hx of colon cancer, Fibromyalgia, Hyperlipemia, IBS (irritable bowel syndrome), Lumbar spondylosis, New onset type 2 diabetes mellitus ([address] and Urticaria, chronic.   Past Surgical History:  has a past surgical history that includes Tympanostomy tube placement; Cervical polyp removal (9/2004); Carpal tunnel release (Bilateral); Tubal ligation; Intracapsular cataract extraction (Left, 6/23/2020); and Intracapsular cataract extraction (Right, 7/14/2020). Assessment   Performance deficits / Impairments: Decreased functional mobility ; Decreased safe awareness;Decreased balance;Decreased ADL status; Decreased high-level IADLs;Decreased endurance;Decreased strength  Assessment: 79 y/o female admit 7/3/2022 with COPD Exac, ROLY, UTI and Sepsis. PMH as noted including COPD, Osteoporosis, Fibromyalgia, B Carpal Tunnel Release. PTA pt living with sign other in apt setting with level entry/access; independent daily care and functional mobility (without assist device). Today, pt desats with minimal activity. Pt stood from chair twice with min A to/from RW- cuing for hand placement. Pt tolerated standing ~ 20-30 sec with each stand. Pt appears to have desat with activity to at least low 80s but difficult to obtain accurate reading. Pt with functional UE ROM for self care and anticipate will require up to max A for ADLs d/t low endurance. Pt is functioning below baseline and benefit from skilled therapy. At this time, recommend skilled placement prior to returning home, however if oxygen requirement decreases pt may be safe to return home. Prognosis: Good  REQUIRES OT FOLLOW-UP: Yes  Activity Tolerance  Activity Tolerance: Patient limited by fatigue  Activity Tolerance Comments: O2 12L via High Flow with sats low 90's (intermittently high 80's) at rest.  Limited rell activity, desat low 80's (difficulty obtain accurate reading).         Plan   Plan  Times per Week: 3-5  Current Treatment Recommendations: Strengthening,Balance training,Functional mobility training,Endurance training,Self-Care / ADL,Safety education & training,Gait training     Restrictions  Restrictions/Precautions  Restrictions/Precautions: Fall Risk  Position Activity Restriction  Other position/activity restrictions: O2 12L via High Flow with sats low 90's (intermittently high 80's) at rest.  Limited rell activity, desat low 80's (difficulty obtain accurate reading). Subjective   General  Chart Reviewed: Yes  Patient assessed for rehabilitation services?: Yes  Additional Pertinent Hx: 79 y/o female admit 7/3/2022 with COPD Exac, ROLY, UTI and Sepsis. PMH as noted including COPD, Osteoporosis, Fibromyalgia, B Carpal Tunnel Release. Family / Caregiver Present: No  Referring Practitioner: Kayla Tang MD  Subjective  Subjective: Pt seen bedside and agreeable to therapy. Pt denied pain. General Comment  Comments: Per RN ok for therapy. Social/Functional History  Social/Functional History  Lives With: Significant other (Sign other ([facility], [facility]). )  Type of Home: Apartment (1st floor.)  Home Layout: One level (Laundry in apt.)  Home Access: Level entry  Bathroom Shower/Tub: Tub/Shower unit  Bathroom Toilet: Standard  Bathroom Equipment: Shower chair  Bathroom Accessibility: Accessible  Home Equipment: Cane (No Home O2 pta.)  ADL Assistance: Independent  Homemaking Assistance:  (Shared with sign other.)  Homemaking Responsibilities: Yes  Ambulation Assistance: Independent (Without assist device pta.)  Transfer Assistance: Independent  Active : No (Sign other drives.)  Occupation: Retired  Additional Comments: Pt reports adequate assist/support upon return home. Objective     Safety Devices  Type of Devices: Call light within reach; Left in chair;Nurse notified        AROM: Within functional limits  Strength: Generally decreased, functional  Coordination: Within functional limits        Activity Tolerance  Activity Tolerance: Patient limited by endurance  Activity Tolerance Comments: Pt now requiring O2 12L; desat low 80's with very min activity rell. (no home O2 pta).      Bed mobility  Bed Mobility Comments: Pt in chair at start/end of sesison     Transfers  Sit to stand: Minimal assistance  Stand to sit: Minimal assistance  Transfer Comments: stood twice from chair to/from RW with min A. Pt stood ~ 20-30 sec for each stand with CGA. Changed soiled depends while pt standing and cleansed pt     Cognition  Overall Cognitive Status: WFL  Orientation  Overall Orientation Status: Within Functional Limits           Education Given To: Patient  Education Provided: Role of Therapy;Plan of Care;Transfer Training  Education Method: Demonstration;Verbal  Barriers to Learning: None  Education Outcome: Verbalized understanding    AM-PAC Score  AM-PAC Inpatient Daily Activity Raw Score: 16 (07/08/22 1011)  AM-PAC Inpatient ADL T-Scale Score : 35.96 (07/08/22 1011)  ADL Inpatient CMS 0-100% Score: 53.32 (07/08/22 1011)  ADL Inpatient CMS G-Code Modifier : CK (07/08/22 1011)    Goals  Short Term Goals  Time Frame for Short term goals: Prior to DC: goals ongoing  Short Term Goal 1: Pt will complete ADL transfer/mobility with supervision  Short Term Goal 2: Pt will tolerate standing > 3 min for functional task with supervision  Short Term Goal 3: Pt will complete toileting with min A  Short Term Goal 4: Pt will complete LB Dressing with min A  Short Term Goal 5: Pt will complete UE exercises in all planes to inc strength/endurance for ADLs  Patient Goals   Patient goals : to return home       Therapy Time   Individual Concurrent Group Co-treatment   Time In 0915         Time Out 0940         Minutes 25         Timed Code Treatment Minutes: 25 Minutes     This note to serve as OT d/c summary if pt is d/c-ed prior to next therapy session.     Patria Castle, OTR/L

## 2022-07-08 NOTE — ACP (ADVANCE CARE PLANNING)
Advance Care Planning     Advance Care Planning Activator (Inpatient)  Conversation Note      Date of ACP Conversation: 7/8/2022     Conversation Conducted with: Radha Brunner and Sign Other Phillip Jane    ACP Activator: [phone] Decision Maker:     Current Designated Health Care Decision Maker:     Primary Decision Maker: Wilmer Morales - [facility] - [phone]  Click here to complete Healthcare Decision Makers including section of the Healthcare Decision Maker Relationship (ie \"Primary\")      Care Preferences    Ventilation: \"If you were in your present state of health and suddenly became very ill and were unable to breathe on your own, what would your preference be about the use of a ventilator (breathing machine) if it were available to you? \"      Would the patient desire the use of ventilator (breathing machine)?:\" Yes\"    \"If your health worsens and it becomes clear that your chance of recovery is unlikely, what would your preference be about the use of a ventilator (breathing machine) if it were available to you? \"     Would the patient desire the use of ventilator (breathing machine)?:  \" No\"    Resuscitation  \"CPR works best to restart the heart when there is a sudden event, like a heart attack, in someone who is otherwise healthy. Unfortunately, CPR does not typically restart the heart for people who have serious health conditions or who are very sick. \"    \"In the event your heart stopped as a result of an underlying serious health condition, would you want attempts to be made to restart your heart (answer \"yes\" for attempt to resuscitate) or would you prefer a natural death (answer \"no\" for do not attempt to resuscitate)? \"   \"Yes\"       [] Yes   [] No   Educated Patient / Hermila Woo regarding differences between Advance Directives and portable DNR orders.     Length of ACP Conversation in minutes:  1.5 minutes    Conversation Outcomes:  [x] ACP discussion completed  [] Existing advance directive reviewed with patient; no changes to patient's previously recorded wishes  [] New Advance Directive completed  [] Portable Do Not Rescitate prepared for Provider review and signature  [] POLST/POST/MOLST/MOST prepared for Provider review and signature      Follow-up plan:    [] Schedule follow-up conversation to continue planning  [] Referred individual to Provider for additional questions/concerns   [] Advised patient/agent/surrogate to review completed ACP document and update if needed with changes in condition, patient preferences or care setting    [x] This note routed to one or more involved healthcare providers     Routed to her pcp, Dr Almonte Opal, [place]     Case Management   [phone]    7/8/2022  3:07 PM

## 2022-07-08 NOTE — PLAN OF CARE
Problem: Discharge Planning  Goal: Discharge to home or other facility with appropriate resources  Outcome: Progressing     Problem: Pain  Goal: Verbalizes/displays adequate comfort level or baseline comfort level  Outcome: Progressing  Note: Patient was monitor for pain during this shift. Numeric scale was used. No pain med was given during this shift. Problem: Skin/Tissue Integrity  Goal: Absence of new skin breakdown  Description: 1. Monitor for areas of redness and/or skin breakdown  2. Assess vascular access sites hourly  3. Every 4-6 hours minimum:  Change oxygen saturation probe site  4. Every 4-6 hours:  If on nasal continuous positive airway pressure, respiratory therapy assess nares and determine need for appliance change or resting period. Outcome: Progressing  Note: No new skin breakdown was noted during this shift. Problem: Safety - Adult  Goal: Free from fall injury  Outcome: Progressing  Note: No fall injury observed.      Problem: ABCDS Injury Assessment  Goal: Absence of physical injury  Outcome: Progressing     Problem: Chronic Conditions and Co-morbidities  Goal: Patient's chronic conditions and co-morbidity symptoms are monitored and maintained or improved  Outcome: Progressing

## 2022-07-08 NOTE — PROGRESS NOTES
Pulmonary Progress Note    CC:  Follow up pneumonia, COPD    Subjective:  HF oxygen at 12 liters  She thinks she is a little bit better. Lots of chest congestion. Coughing up bloody sputum    ROS  Feeling better  Less SOB     Intake/Output Summary (Last 24 hours) at 7/8/2022 0740  Last data filed at 7/8/2022 7155  Gross per 24 hour   Intake --   Output 725 ml   Net -725 ml         PHYSICAL EXAM:  Blood pressure (!) 153/97, pulse (!) 101, temperature 98.5 °F (36.9 °C), temperature source Axillary, resp. rate 18, height 5' 4\" (1.626 m), weight 139 lb 15.9 oz (63.5 kg), SpO2 90 %, not currently breastfeeding.'  Gen: No distress. Eyes: PERRL. No sclera icterus. No conjunctival injection. ENT: No discharge. Pharynx clear. External appearance of ears and nose normal.  Neck: Trachea midline. No obvious mass. Resp: More wheezing today, rhonchi too, bloody sputum in suction cannister   CV: Regular rate. Regular rhythm. No murmur or rub. GI: Non-tender. Non-distended. No hernia. Skin: Warm, dry, normal texture and turgor. No nodule on exposed extremities. Lymph: No cervical LAD. No supraclavicular LAD. M/S: No cyanosis. No clubbing. No joint deformity. Neuro: Moves all four extremities. CN 2-12 tested, no defect noted.   Ext:   no edema    Medications:    Scheduled Meds:   metroNIDAZOLE  500 mg IntraVENous Q8H    budesonide  0.5 mg Nebulization BID    lactobacillus  2 capsule Oral BID    heparin (porcine)  5,000 Units SubCUTAneous 3 times per day    DULoxetine  60 mg Oral Daily    traZODone  25 mg Oral Nightly    sodium chloride flush  5-40 mL IntraVENous 2 times per day    methylPREDNISolone  40 mg IntraVENous Q12H    ipratropium-albuterol  1 ampule Inhalation Q4H WA    insulin lispro  0-6 Units SubCUTAneous TID WC    insulin lispro  0-3 Units SubCUTAneous Nightly    azithromycin  500 mg IntraVENous Q24H    atorvastatin  40 mg Oral Daily    gabapentin  100 mg Oral BID    pantoprazole  40 mg Oral Daily       Continuous Infusions:   sodium chloride      dextrose         PRN Meds:  benzocaine-menthol, oxyCODONE-acetaminophen, sodium chloride flush, sodium chloride, ondansetron **OR** ondansetron, polyethylene glycol, acetaminophen **OR** acetaminophen, glucose, dextrose bolus **OR** dextrose bolus, glucagon (rDNA), dextrose, albuterol sulfate HFA    Labs:  CBC:   Recent Labs     22  0356   WBC 13.3* 13.7* 14.8*   HGB 11.5* 11.2* 11.6*   HCT 34.1* 33.3* 34.0*   MCV 95.1 94.3 95.0    176 200     BMP:   Recent Labs     22  0356    138 136   K 2.9* 3.6 3.6   CL 96* 97* 96*   CO2 24 30 30   PHOS 1.9* 2.3* 2.2*   BUN 29* 20 16   CREATININE 0.8 0.7 0.6     LIVER PROFILE:   No results for input(s): AST, ALT, LIPASE, BILIDIR, BILITOT, ALKPHOS in the last 72 hours. Invalid input(s): AMYLASE,  ALB  PT/INR: No results for input(s): PROTIME, INR in the last 72 hours. APTT: No results for input(s): APTT in the last 72 hours. UA:  No results for input(s): NITRITE, COLORU, PHUR, LABCAST, WBCUA, RBCUA, MUCUS, TRICHOMONAS, YEAST, BACTERIA, CLARITYU, SPECGRAV, LEUKOCYTESUR, UROBILINOGEN, BILIRUBINUR, BLOODU, GLUCOSEU, AMORPHOUS in the last 72 hours. Invalid input(s): Amarilis Abraham  No results for input(s): PH, PCO2, PO2 in the last 72 hours.         Films:  Chest imaging reports were reviewed and imaging was reviewed by me and showed RUL opacity with underlying chronic lung disease     AB    Cultures:  Sputum:  Pseudomonas   Antigens:  Negative    I reviewed the labs and images listed above    Assessment/Plan:    Acute Hypoxic Respiratory Failure with saturations less than 90% on room air with oxygen requirements increasing   Titrate oxygen for saturations greater than or equal to 90%  o DC Bilevel   o Home oxygen assessment prior to DC  o Lasix 40 mg IV once  o CXR today to assess for complications I.e effusion, pneumothorax etc   Acute on Chronic Hypercapnic Respiratory Failure   o DC Bilevel    Severe Sepsis due to pseudomonas   o Completed 7 days of cefepime, will now use 7 days of levaquin   o Continue Lactobacillus   o Procal trend   RUL pneumonia:  Pseudomonas   o 14 days of therapy.   See above   Moderately Severe COPD   o Pulmiocort nebs  o Duonebs q 4 hours  o IV solumedrol q 12 hours    ROLY, resolved    Tobacco Abuse    DVT prophylaxis  Heparin     PT/OT    Conor Mccray, DO [facility]

## 2022-07-08 NOTE — PROGRESS NOTES
Hospitalist Progress Note      PCP: HECTOR Gamboa - CNP    Date of Admission: 7/3/2022        Subjective: Coughing. Looks better. On high flow oxygen        Medications:  Reviewed    Infusion Medications    sodium chloride      dextrose       Scheduled Medications    levoFLOXacin  750 mg Oral Daily    budesonide  0.5 mg Nebulization BID    lactobacillus  2 capsule Oral BID    heparin (porcine)  5,000 Units SubCUTAneous 3 times per day    DULoxetine  60 mg Oral Daily    traZODone  25 mg Oral Nightly    sodium chloride flush  5-40 mL IntraVENous 2 times per day    methylPREDNISolone  40 mg IntraVENous Q12H    ipratropium-albuterol  1 ampule Inhalation Q4H WA    insulin lispro  0-6 Units SubCUTAneous TID WC    insulin lispro  0-3 Units SubCUTAneous Nightly    atorvastatin  40 mg Oral Daily    gabapentin  100 mg Oral BID    pantoprazole  40 mg Oral Daily     PRN Meds: benzocaine-menthol, oxyCODONE-acetaminophen, sodium chloride flush, sodium chloride, ondansetron **OR** ondansetron, polyethylene glycol, acetaminophen **OR** acetaminophen, glucose, dextrose bolus **OR** dextrose bolus, glucagon (rDNA), dextrose, albuterol sulfate HFA      Intake/Output Summary (Last 24 hours) at 7/8/2022 1751  Last data filed at 7/8/2022 1600  Gross per 24 hour   Intake --   Output 1440 ml   Net -1440 ml       Physical Exam Performed:    /88   Pulse 98   Temp 98.2 °F (36.8 °C) (Oral)   Resp 18   Ht 5' 4\" (1.626 m)   Wt 139 lb 15.9 oz (63.5 kg)   SpO2 95%   BMI 24.03 kg/m²     General appearance: No acute distress  Neck: Supple  Respiratory: Coarse breath sounds unchanged from 7/6/2022 exam  Cardiovascular: Regular rate and rhythm with normal S1/S2 without murmurs, rubs or gallops. Abdomen: Soft, non-tender, non-distended   Musculoskeletal: No clubbing, cyanosis   Skin: Skin color, texture, turgor normal.  No rashes or lesions.   Neurologic: No Focal weakness  Psychiatric: Alert and Priority: Medium    Elevated lactic acid level [R79.89]      Priority: Medium    Diabetes ([facility] [E11.9]     COPD exacerbation ([facility]) [J44.1]     Severe sepsis ([facility]) [A41.9, R65.20]     ROLY (acute kidney injury) ([facility] [N17.9]     Acute on chronic respiratory failure with hypercapnia ([facility]) [J96.22]        Severe sepsis, pneumonia, admitted to ICU for low blood pressure-  Off cefipime  Now on levaquin    Pseudomonal pneumonia  Completed cefipime  On levaquin    COPD with exacerbation  Remains on moderate oxygen  At risk for intubation  On Solu-Medrol  DuoNebs    Diabetes mellitus type 2-  Continue Lantus and Humalog        DVT Prophylaxis: Lovenox  Diet: ADULT DIET;  Regular; 4 carb choices (60 gm/meal)  Code Status: Full Code      Dispo -another 24 to 48 hours of inpatient care predicted  Will likely need a higher level of care at discharge    Pj Alvarez MD

## 2022-07-08 NOTE — PROGRESS NOTES
Physical Therapy  Facility/Department: [address] ICU  Physical Therapy Treatment Note    Name: Alfonso Sanchez  :   MRN: [id number]  Date of Service: 2022    Discharge Recommendations:  Continue to assess pending progress (O2 needs now 12L.)          Alfonso Sanchez scored a 16/24 on the AM-PAC short mobility form. Current research shows that an AM-PAC score of 17 or less is typically not associated with a discharge to the patient's home setting. Based on the patient's AM-PAC score and their current functional mobility deficits, it is recommended that the patient have 3-5 sessions per week of Physical Therapy at d/c to increase the patient's independence. Please see assessment section for further patient specific details. If patient discharges prior to next session this note will serve as a discharge summary. Please see below for the latest assessment towards goals. Assessment   Body Structures, Functions, Activity Limitations Requiring Skilled Therapeutic Intervention: Decreased functional mobility ; Decreased endurance  Assessment: 77 y/o female admit 7/3/2022 with COPD Exac, ROLY, UTI and Sepsis. PMH as noted including COPD, Osteoporosis, Fibromyalgia, B Carpal Tunnel Release. PTA pt living with sign other in apt setting with level entry/access; independent daily care and functional mobility (without assist device). Pt now requiring O2 12L; desat low 80's with very min activity rell. (no home O2 pta). Pt rell Transfers/Brief Stand with Walker x 2 attempts with Min assist.  No LOB although VERY  limited endurance. At this time pt reports adequate assist/support for d/c home; will monitor for potential cont PT (3-5) at this time. Therapy Prognosis: Fair  History: 77 y/o female admit 7/3/2022 with COPD Exac, ROLY, UTI and Sepsis. PMH as noted including COPD, Osteoporosis, Fibromyalgia, B Carpal Tunnel Release. Exam: See above. Clinical Presentation: See above.   Barriers to Learning: Endurance. Requires PT Follow-Up: Yes  Activity Tolerance  Activity Tolerance: Patient limited by endurance  Activity Tolerance Comments: Pt now requiring O2 12L; desat low 80's with very min activity rell. (no home O2 pta). Plan   Plan  Plan: 3-5 times per week  Current Treatment Recommendations: Functional mobility training,Transfer training,Gait training,Endurance training,Safety education & training,Patient/Caregiver education & training  Safety Devices  Type of Devices: Call light within reach,Left in chair,Nurse notified     Restrictions  Restrictions/Precautions  Restrictions/Precautions: Fall Risk  Position Activity Restriction  Other position/activity restrictions: O2 12L via High Flow with sats low 90's (intermittently high 80's) at rest.  Limited rell activity, desat low 80's (difficulty obtain accurate reading). Subjective   General  Patient assessed for rehabilitation services?: Yes         Social/Functional History  Social/Functional History  Lives With: Significant other (Sign other ([facility], [facility], [facility] )  Type of Home: Apartment (1st floor.)  Home Layout: One level (Laundry in apt.)  Home Access: Level entry  Bathroom Shower/Tub: Tub/Shower unit  Bathroom Toilet: Standard  Bathroom Equipment: Shower chair  Bathroom Accessibility: Accessible  Home Equipment: Cane (No Home O2 pta.)  ADL Assistance: Independent  Homemaking Assistance:  (Shared with sign other.)  Homemaking Responsibilities: Yes  Ambulation Assistance: Independent (Without assist device pta.)  Transfer Assistance: Independent  Active : No (Sign other drives.)  Occupation: Retired  Additional Comments: Pt reports adequate assist/support upon return home.   Vision/Hearing  Vision  Vision: Within Functional Limits  Hearing  Hearing: Within functional limits    Cognition   Orientation  Overall Orientation Status: Within Functional Limits  Cognition  Overall Cognitive Status: WFL     Objective                              Bed mobility  Bed Mobility Comments: Pt oob prior PT arrival.  Transfers  Sit to Stand: Minimal Assistance (With Steven Au.)  Stand to sit: Minimal Assistance (With Steven Au.)  Comment: Cues for safe hand placement. Completed Transfers x 2 attempts with Kenisha Ground assist.        Balance  Comments: Pt able to maintain static stand with Walker x 2 attempts; 20-30 sec with each. Min assist.  Initial Transfer; pad in chair and pt gown saturated with urine despite catheter. Changed gown, pad (nursing notified). AM-PAC Score  AM-PAC Inpatient Mobility Raw Score : 16 (07/08/22 1008)  AM-PAC Inpatient T-Scale Score : 40.78 (07/08/22 1008)  Mobility Inpatient CMS 0-100% Score: 54.16 (07/08/22 1008)  Mobility Inpatient CMS G-Code Modifier : CK (07/08/22 1008)          Goals  Short Term Goals  Time Frame for Short term goals: Upon d/c acute care setting. Short term goal 1: Bed Mob Supervision. Short term goal 2: Transfers with/without assist device SBA. Short term goal 3: Amb with/without assist device 50' SBA. Patient Goals   Patient goals : Return home with sign other. Education  Patient Education  Education Given To: Patient  Education Provided Comments: Role of PT, POC, Need to call for assist, Safe use of Walker.   Education Method: Verbal  Barriers to Learning: None  Education Outcome: Verbalized understanding;Continued education needed      Therapy Time   Individual Concurrent Group Co-treatment   Time In 0915         Time Out 0940         Minutes [address] [place] [address],

## 2022-07-09 LAB
ANION GAP SERPL CALCULATED.3IONS-SCNC: 10 MMOL/L (ref 3–16)
ANISOCYTOSIS: ABNORMAL
BANDED NEUTROPHILS RELATIVE PERCENT: 7 % (ref 0–7)
BASOPHILS ABSOLUTE: 0 K/UL (ref 0–0.2)
BASOPHILS RELATIVE PERCENT: 0 %
BUN BLDV-MCNC: 21 MG/DL (ref 7–20)
CALCIUM SERPL-MCNC: 8.1 MG/DL (ref 8.3–10.6)
CHLORIDE BLD-SCNC: 96 MMOL/L (ref 99–110)
CO2: 31 MMOL/L (ref 21–32)
CREAT SERPL-MCNC: 0.6 MG/DL (ref 0.6–1.2)
EOSINOPHILS ABSOLUTE: 0 K/UL (ref 0–0.6)
EOSINOPHILS RELATIVE PERCENT: 0 %
GFR AFRICAN AMERICAN: >60
GFR NON-AFRICAN AMERICAN: >60
GLUCOSE BLD-MCNC: 118 MG/DL (ref 70–99)
GLUCOSE BLD-MCNC: 126 MG/DL (ref 70–99)
GLUCOSE BLD-MCNC: 140 MG/DL (ref 70–99)
GLUCOSE BLD-MCNC: 158 MG/DL (ref 70–99)
GLUCOSE BLD-MCNC: 162 MG/DL (ref 70–99)
HCT VFR BLD CALC: 36.5 % (ref 36–48)
HEMOGLOBIN: 12.3 G/DL (ref 12–16)
LYMPHOCYTES ABSOLUTE: 0.4 K/UL (ref 1–5.1)
LYMPHOCYTES RELATIVE PERCENT: 3 %
MAGNESIUM: 1.9 MG/DL (ref 1.8–2.4)
MCH RBC QN AUTO: 32 PG (ref 26–34)
MCHC RBC AUTO-ENTMCNC: 33.7 G/DL (ref 31–36)
MCV RBC AUTO: 95 FL (ref 80–100)
METAMYELOCYTES RELATIVE PERCENT: 3 %
MONOCYTES ABSOLUTE: 0.1 K/UL (ref 0–1.3)
MONOCYTES RELATIVE PERCENT: 1 %
NEUTROPHILS ABSOLUTE: 13.5 K/UL (ref 1.7–7.7)
NEUTROPHILS RELATIVE PERCENT: 86 %
PDW BLD-RTO: 14.4 % (ref 12.4–15.4)
PERFORMED ON: ABNORMAL
PHOSPHORUS: 2.5 MG/DL (ref 2.5–4.9)
PLATELET # BLD: 214 K/UL (ref 135–450)
PLATELET SLIDE REVIEW: ADEQUATE
PMV BLD AUTO: 9 FL (ref 5–10.5)
POTASSIUM SERPL-SCNC: 3.7 MMOL/L (ref 3.5–5.1)
RBC # BLD: 3.84 M/UL (ref 4–5.2)
SLIDE REVIEW: ABNORMAL
SMUDGE CELLS: PRESENT
SODIUM BLD-SCNC: 137 MMOL/L (ref 136–145)
TOXIC GRANULATION: PRESENT
VACUOLATED NEUTROPHILS: PRESENT
WBC # BLD: 14.1 K/UL (ref 4–11)

## 2022-07-09 PROCEDURE — 80048 BASIC METABOLIC PNL TOTAL CA: CPT

## 2022-07-09 PROCEDURE — 6360000002 HC RX W HCPCS: Performed by: INTERNAL MEDICINE

## 2022-07-09 PROCEDURE — 6370000000 HC RX 637 (ALT 250 FOR IP): Performed by: INTERNAL MEDICINE

## 2022-07-09 PROCEDURE — 83735 ASSAY OF MAGNESIUM: CPT

## 2022-07-09 PROCEDURE — 2700000000 HC OXYGEN THERAPY PER DAY

## 2022-07-09 PROCEDURE — 94669 MECHANICAL CHEST WALL OSCILL: CPT

## 2022-07-09 PROCEDURE — 2060000000 HC ICU INTERMEDIATE R&B

## 2022-07-09 PROCEDURE — 2580000003 HC RX 258: Performed by: INTERNAL MEDICINE

## 2022-07-09 PROCEDURE — 85025 COMPLETE CBC W/AUTO DIFF WBC: CPT

## 2022-07-09 PROCEDURE — 94640 AIRWAY INHALATION TREATMENT: CPT

## 2022-07-09 PROCEDURE — 36415 COLL VENOUS BLD VENIPUNCTURE: CPT

## 2022-07-09 PROCEDURE — 84100 ASSAY OF PHOSPHORUS: CPT

## 2022-07-09 PROCEDURE — 94761 N-INVAS EAR/PLS OXIMETRY MLT: CPT

## 2022-07-09 PROCEDURE — 99233 SBSQ HOSP IP/OBS HIGH 50: CPT | Performed by: INTERNAL MEDICINE

## 2022-07-09 RX ADMIN — ATORVASTATIN CALCIUM 40 MG: 40 TABLET, FILM COATED ORAL at 08:47

## 2022-07-09 RX ADMIN — DULOXETINE HYDROCHLORIDE 60 MG: 60 CAPSULE, DELAYED RELEASE ORAL at 08:47

## 2022-07-09 RX ADMIN — INSULIN LISPRO 1 UNITS: 100 INJECTION, SOLUTION INTRAVENOUS; SUBCUTANEOUS at 17:17

## 2022-07-09 RX ADMIN — IPRATROPIUM BROMIDE AND ALBUTEROL SULFATE 1 AMPULE: .5; 3 SOLUTION RESPIRATORY (INHALATION) at 11:28

## 2022-07-09 RX ADMIN — HEPARIN SODIUM 5000 UNITS: 5000 INJECTION INTRAVENOUS; SUBCUTANEOUS at 22:20

## 2022-07-09 RX ADMIN — SODIUM CHLORIDE, PRESERVATIVE FREE 10 ML: 5 INJECTION INTRAVENOUS at 22:08

## 2022-07-09 RX ADMIN — IPRATROPIUM BROMIDE AND ALBUTEROL SULFATE 1 AMPULE: .5; 3 SOLUTION RESPIRATORY (INHALATION) at 15:33

## 2022-07-09 RX ADMIN — SODIUM CHLORIDE, PRESERVATIVE FREE 10 ML: 5 INJECTION INTRAVENOUS at 08:48

## 2022-07-09 RX ADMIN — LEVOFLOXACIN 750 MG: 500 TABLET, FILM COATED ORAL at 08:46

## 2022-07-09 RX ADMIN — BUDESONIDE 500 MCG: 0.5 SUSPENSION RESPIRATORY (INHALATION) at 07:56

## 2022-07-09 RX ADMIN — HEPARIN SODIUM 5000 UNITS: 5000 INJECTION INTRAVENOUS; SUBCUTANEOUS at 05:09

## 2022-07-09 RX ADMIN — OXYCODONE AND ACETAMINOPHEN 1 TABLET: 5; 325 TABLET ORAL at 13:01

## 2022-07-09 RX ADMIN — TRAZODONE HYDROCHLORIDE 25 MG: 50 TABLET ORAL at 22:17

## 2022-07-09 RX ADMIN — IPRATROPIUM BROMIDE AND ALBUTEROL SULFATE 1 AMPULE: .5; 3 SOLUTION RESPIRATORY (INHALATION) at 20:26

## 2022-07-09 RX ADMIN — METHYLPREDNISOLONE SODIUM SUCCINATE 40 MG: 40 INJECTION, POWDER, FOR SOLUTION INTRAMUSCULAR; INTRAVENOUS at 22:07

## 2022-07-09 RX ADMIN — OXYCODONE AND ACETAMINOPHEN 1 TABLET: 5; 325 TABLET ORAL at 05:11

## 2022-07-09 RX ADMIN — GABAPENTIN 100 MG: 100 CAPSULE ORAL at 22:18

## 2022-07-09 RX ADMIN — INSULIN LISPRO 1 UNITS: 100 INJECTION, SOLUTION INTRAVENOUS; SUBCUTANEOUS at 22:21

## 2022-07-09 RX ADMIN — Medication 2 CAPSULE: at 22:19

## 2022-07-09 RX ADMIN — GABAPENTIN 100 MG: 100 CAPSULE ORAL at 08:47

## 2022-07-09 RX ADMIN — Medication 2 CAPSULE: at 08:47

## 2022-07-09 RX ADMIN — HEPARIN SODIUM 5000 UNITS: 5000 INJECTION INTRAVENOUS; SUBCUTANEOUS at 13:01

## 2022-07-09 RX ADMIN — IPRATROPIUM BROMIDE AND ALBUTEROL SULFATE 1 AMPULE: .5; 3 SOLUTION RESPIRATORY (INHALATION) at 07:56

## 2022-07-09 RX ADMIN — METHYLPREDNISOLONE SODIUM SUCCINATE 40 MG: 40 INJECTION, POWDER, FOR SOLUTION INTRAMUSCULAR; INTRAVENOUS at 08:47

## 2022-07-09 RX ADMIN — PANTOPRAZOLE SODIUM 40 MG: 40 TABLET, DELAYED RELEASE ORAL at 08:47

## 2022-07-09 RX ADMIN — BUDESONIDE 500 MCG: 0.5 SUSPENSION RESPIRATORY (INHALATION) at 20:26

## 2022-07-09 ASSESSMENT — PAIN - FUNCTIONAL ASSESSMENT: PAIN_FUNCTIONAL_ASSESSMENT: PREVENTS OR INTERFERES SOME ACTIVE ACTIVITIES AND ADLS

## 2022-07-09 ASSESSMENT — PAIN SCALES - GENERAL
PAINLEVEL_OUTOF10: 9
PAINLEVEL_OUTOF10: 1
PAINLEVEL_OUTOF10: 9

## 2022-07-09 ASSESSMENT — PAIN DESCRIPTION - LOCATION
LOCATION: BACK
LOCATION: BACK

## 2022-07-09 ASSESSMENT — PAIN DESCRIPTION - ORIENTATION: ORIENTATION: LOWER

## 2022-07-09 ASSESSMENT — PAIN DESCRIPTION - DESCRIPTORS: DESCRIPTORS: SHARP;ACHING

## 2022-07-09 NOTE — PROGRESS NOTES
Pulmonary Progress Note    CC:  Follow up pneumonia, COPD    Subjective:  HF oxygen at 11 liters  Transferred out of the ICU  She has a lot of back pain   Trying to remain positive but upset she is still sick in hospital    ROS  Back pain  Muscle pain     Intake/Output Summary (Last 24 hours) at 7/9/2022 1003  Last data filed at 7/9/2022 0530  Gross per 24 hour   Intake 150 ml   Output 1350 ml   Net -1200 ml         PHYSICAL EXAM:  Blood pressure 133/78, pulse (!) 105, temperature 98.7 °F (37.1 °C), temperature source Oral, resp. rate 20, height 5' 4\" (1.626 m), weight 139 lb 15.9 oz (63.5 kg), SpO2 93 %, not currently breastfeeding.'  Gen: Mildly distressed   Eyes: PERRL. No sclera icterus. No conjunctival injection. ENT: No discharge. Pharynx clear. External appearance of ears and nose normal.  Neck: Trachea midline. No obvious mass. Resp: Rhonchi  CV: Regular rate. Regular rhythm. No murmur or rub. GI: Non-tender. Non-distended. No hernia. Skin: Warm, dry, normal texture and turgor. No nodule on exposed extremities. Lymph: No cervical LAD. No supraclavicular LAD. M/S: No cyanosis. No clubbing. No joint deformity. Neuro: Moves all four extremities. CN 2-12 tested, no defect noted.   Ext:   no edema    Medications:    Scheduled Meds:   levoFLOXacin  750 mg Oral Daily    budesonide  0.5 mg Nebulization BID    lactobacillus  2 capsule Oral BID    heparin (porcine)  5,000 Units SubCUTAneous 3 times per day    DULoxetine  60 mg Oral Daily    traZODone  25 mg Oral Nightly    sodium chloride flush  5-40 mL IntraVENous 2 times per day    methylPREDNISolone  40 mg IntraVENous Q12H    ipratropium-albuterol  1 ampule Inhalation Q4H WA    insulin lispro  0-6 Units SubCUTAneous TID WC    insulin lispro  0-3 Units SubCUTAneous Nightly    atorvastatin  40 mg Oral Daily    gabapentin  100 mg Oral BID    pantoprazole  40 mg Oral Daily       Continuous Infusions:   sodium chloride      dextrose PRN Meds:  benzocaine-menthol, oxyCODONE-acetaminophen, sodium chloride flush, sodium chloride, ondansetron **OR** ondansetron, polyethylene glycol, acetaminophen **OR** acetaminophen, glucose, dextrose bolus **OR** dextrose bolus, glucagon (rDNA), dextrose, albuterol sulfate HFA    Labs:  CBC:   Recent Labs     226 22  0614   WBC 13.7* 14.8* 14.1*   HGB 11.2* 11.6* 12.3   HCT 33.3* 34.0* 36.5   MCV 94.3 95.0 95.0    200 214     BMP:   Recent Labs     226 22  0614    136 137   K 3.6 3.6 3.7   CL 97* 96* 96*   CO2 30 30 31   PHOS 2.3* 2.2* 2.5   BUN 20 16 21*   CREATININE 0.7 0.6 0.6     LIVER PROFILE:   No results for input(s): AST, ALT, LIPASE, BILIDIR, BILITOT, ALKPHOS in the last 72 hours. Invalid input(s): AMYLASE,  ALB  PT/INR: No results for input(s): PROTIME, INR in the last 72 hours. APTT: No results for input(s): APTT in the last 72 hours. UA:  No results for input(s): NITRITE, COLORU, PHUR, LABCAST, WBCUA, RBCUA, MUCUS, TRICHOMONAS, YEAST, BACTERIA, CLARITYU, SPECGRAV, LEUKOCYTESUR, UROBILINOGEN, BILIRUBINUR, BLOODU, GLUCOSEU, AMORPHOUS in the last 72 hours. Invalid input(s): Marigene Safe  No results for input(s): PH, PCO2, PO2 in the last 72 hours. Films:  Chest imaging reports were reviewed and imaging was reviewed by me and showed significant disease in the right lung with relative sparing of the left lung     AB.[id number]    Cultures:  Sputum:  Pseudomonas   Antigens:  Negative    I reviewed the labs and images listed above    Assessment/Plan:    Acute Hypoxic Respiratory Failure with saturations less than 90% on room air with oxygen requirements increasing   Titrate oxygen for saturations greater than or equal to 90%  Still with very high oxygen requirements.   Very slow to improve  o Home oxygen assessment prior to DC   Acute on Chronic Hypercapnic Respiratory Failure   o Bilevel was stopped   Severe Sepsis due to pseudomonas   o Completed 7 days of cefepime, now 7 days of levaquin (at minimum). May have to extend course past 14 days  o Continue Lactobacillus   o Procal trend   RUL pneumonia:  Pseudomonas   o 14 days of therapy.   See above   Moderately Severe COPD   o Pulmiocort nebs  o Duonebs q 4 hours  o IV solumedrol q 12 hours until we start seeing rapid improvement in oxygen requirements    Tobacco Abuse  o Must quit    DVT prophylaxis  Heparin       Bruce Roche, DO  [facility]

## 2022-07-09 NOTE — PROGRESS NOTES
Hospitalist Progress Note      PCP: HECTOR Lyon - CNP    Date of Admission: 7/3/2022        Subjective: Uneventful night. Patient transferred to medical stepdown  She is on 10 L right now  Appetite is poor  Her  is present they are concerned about discharge planning          Medications:  Reviewed    Infusion Medications    sodium chloride      dextrose       Scheduled Medications    levoFLOXacin  750 mg Oral Daily    budesonide  0.5 mg Nebulization BID    lactobacillus  2 capsule Oral BID    heparin (porcine)  5,000 Units SubCUTAneous 3 times per day    DULoxetine  60 mg Oral Daily    traZODone  25 mg Oral Nightly    sodium chloride flush  5-40 mL IntraVENous 2 times per day    methylPREDNISolone  40 mg IntraVENous Q12H    ipratropium-albuterol  1 ampule Inhalation Q4H WA    insulin lispro  0-6 Units SubCUTAneous TID WC    insulin lispro  0-3 Units SubCUTAneous Nightly    atorvastatin  40 mg Oral Daily    gabapentin  100 mg Oral BID    pantoprazole  40 mg Oral Daily     PRN Meds: benzocaine-menthol, oxyCODONE-acetaminophen, sodium chloride flush, sodium chloride, ondansetron **OR** ondansetron, polyethylene glycol, acetaminophen **OR** acetaminophen, glucose, dextrose bolus **OR** dextrose bolus, glucagon (rDNA), dextrose, albuterol sulfate HFA      Intake/Output Summary (Last 24 hours) at 7/9/2022 1504  Last data filed at 7/9/2022 0530  Gross per 24 hour   Intake 150 ml   Output 900 ml   Net -750 ml       Physical Exam Performed:    BP (!) 142/85   Pulse (!) 107   Temp 98.5 °F (36.9 °C) (Oral)   Resp 21   Ht 5' 4\" (1.626 m)   Wt 139 lb 15.9 oz (63.5 kg)   SpO2 96%   BMI 24.03 kg/m²     General appearance: Mildly dyspneic  Neck: Supple  Respiratory: Improved aeration compared to 7/8/2022 exam  Cardiovascular: Regular rate and rhythm with normal S1/S2 without murmurs, rubs or gallops.   Abdomen: Soft, non-tender, non-distended   Musculoskeletal: No clubbing, Medium    Pneumonia of right upper lobe due to infectious organism [J18.9]      Priority: Medium    General weakness [R53.1]      Priority: Medium    Elevated lactic acid level [R79.89]      Priority: Medium    Diabetes ([facility] [address] [E11.9]     COPD exacerbation ([facility]) [J44.1]     Severe sepsis ([facility]) [A41.9, R65.20]     ROLY (acute kidney injury) ([facility] [address] [N17.9]     Acute on chronic respiratory failure with hypercapnia ([facility]) [J96.22]        Severe sepsis, pneumonia, admitted to ICU for low blood pressure-  Off cefipime  Now on levaquin   May Require more prolonged antibiotics per Dr. Liang Torres  Off pressors    Acute hypoxic respiratory failure  Remains on high flow oxygen  She is going to be in the hospital several more days her recovery has been slow    Pseudomonal pneumonia  Completed cefipime  On levaquin    COPD with exacerbation  Remains on moderate oxygen  At risk for intubation  On Solu-Medrol  DuoNebs    Diabetes mellitus type 2-  Continue Lantus and Humalog        DVT Prophylaxis: Lovenox  Diet: ADULT DIET;  Regular; 4 carb choices (60 gm/meal)  Code Status: Full Code      Dispo -another 24 to 48 hours of inpatient care predicted  Will likely need a higher level of care at discharge    Mervat Evans MD

## 2022-07-09 NOTE — PLAN OF CARE
Problem: Discharge Planning  Goal: Discharge to home or other facility with appropriate resources  7/8/2022 2329 by Arin Perdue RN  Outcome: Progressing  Flowsheets (Taken 7/8/2022 2000 by Sindhu Mariscal RN)  Discharge to home or other facility with appropriate resources:   Identify barriers to discharge with patient and caregiver   Arrange for needed discharge resources and transportation as appropriate   Identify discharge learning needs (meds, wound care, etc)     Problem: Pain  Goal: Verbalizes/displays adequate comfort level or baseline comfort level  7/8/2022 2329 by Arin Perdue RN  Outcome: Progressing  Flowsheets (Taken 7/8/2022 2000 by Sindhu Mariscal RN)  Verbalizes/displays adequate comfort level or baseline comfort level:   Encourage patient to monitor pain and request assistance   Assess pain using appropriate pain scale   Administer analgesics based on type and severity of pain and evaluate response     Problem: Skin/Tissue Integrity  Goal: Absence of new skin breakdown  Description: 1. Monitor for areas of redness and/or skin breakdown  2. Assess vascular access sites hourly  3. Every 4-6 hours minimum:  Change oxygen saturation probe site  4. Every 4-6 hours:  If on nasal continuous positive airway pressure, respiratory therapy assess nares and determine need for appliance change or resting period.   7/8/2022 2329 by Arin Perdue RN  Outcome: Progressing

## 2022-07-09 NOTE — PROGRESS NOTES
@8348: Transfer report call to [facility]. @2200: Pt transported to room 5114 with all her belongings. Tolerated transport well. Bedside hand off with [facility].

## 2022-07-09 NOTE — PLAN OF CARE
Pain/discomfort being managed with PRN analgesics per MD orders. Pt able to express presence and absence of pain and rate pain appropriately using numerical scale. Intake/Output Summary (Last 24 hours) at 7/9/2022 0735  Last data filed at 7/9/2022 0530  Gross per 24 hour   Intake 150 ml   Output 1350 ml   Net -1200 ml     Vitals:    07/09/22 0700   BP: 133/78   Pulse:    Resp:    Temp: 98.7 °F (37.1 °C)   SpO2:      Skin assessment completed every shift. Pt assessed for incontinence, appropriate barrier cream applied prn. Pt encouraged to turn/rotate every 2 hours. Assistance provided if pt unable to do so themselves. Fall risk assessment completed every shift. All precautions in place. Pt has call light within reach at all times. Room clear of clutter. Pt aware to call for assistance when getting up. Patient assessed for fall risk; fall precautions initiated. Patient and family instructed about safety devices. Environment kept free of clutter and adequate lighting provided. Bed locked and in lowest position. Call light within reach. Will continue to monitor.

## 2022-07-10 LAB
ANION GAP SERPL CALCULATED.3IONS-SCNC: 10 MMOL/L (ref 3–16)
BASOPHILS ABSOLUTE: 0 K/UL (ref 0–0.2)
BASOPHILS RELATIVE PERCENT: 0.1 %
BUN BLDV-MCNC: 23 MG/DL (ref 7–20)
CALCIUM SERPL-MCNC: 8.1 MG/DL (ref 8.3–10.6)
CHLORIDE BLD-SCNC: 95 MMOL/L (ref 99–110)
CO2: 31 MMOL/L (ref 21–32)
CREAT SERPL-MCNC: 0.6 MG/DL (ref 0.6–1.2)
EOSINOPHILS ABSOLUTE: 0 K/UL (ref 0–0.6)
EOSINOPHILS RELATIVE PERCENT: 0 %
GFR AFRICAN AMERICAN: >60
GFR NON-AFRICAN AMERICAN: >60
GLUCOSE BLD-MCNC: 139 MG/DL (ref 70–99)
GLUCOSE BLD-MCNC: 140 MG/DL (ref 70–99)
GLUCOSE BLD-MCNC: 148 MG/DL (ref 70–99)
GLUCOSE BLD-MCNC: 157 MG/DL (ref 70–99)
GLUCOSE BLD-MCNC: 169 MG/DL (ref 70–99)
HCT VFR BLD CALC: 36 % (ref 36–48)
HEMOGLOBIN: 12.1 G/DL (ref 12–16)
LYMPHOCYTES ABSOLUTE: 0.4 K/UL (ref 1–5.1)
LYMPHOCYTES RELATIVE PERCENT: 2.9 %
MAGNESIUM: 1.8 MG/DL (ref 1.8–2.4)
MCH RBC QN AUTO: 32.2 PG (ref 26–34)
MCHC RBC AUTO-ENTMCNC: 33.6 G/DL (ref 31–36)
MCV RBC AUTO: 95.7 FL (ref 80–100)
MONOCYTES ABSOLUTE: 0.2 K/UL (ref 0–1.3)
MONOCYTES RELATIVE PERCENT: 1.2 %
NEUTROPHILS ABSOLUTE: 13.2 K/UL (ref 1.7–7.7)
NEUTROPHILS RELATIVE PERCENT: 95.8 %
PDW BLD-RTO: 13.8 % (ref 12.4–15.4)
PERFORMED ON: ABNORMAL
PHOSPHORUS: 3.1 MG/DL (ref 2.5–4.9)
PLATELET # BLD: 238 K/UL (ref 135–450)
PMV BLD AUTO: 9 FL (ref 5–10.5)
POTASSIUM SERPL-SCNC: 3.7 MMOL/L (ref 3.5–5.1)
RBC # BLD: 3.76 M/UL (ref 4–5.2)
SODIUM BLD-SCNC: 136 MMOL/L (ref 136–145)
WBC # BLD: 13.8 K/UL (ref 4–11)

## 2022-07-10 PROCEDURE — 94669 MECHANICAL CHEST WALL OSCILL: CPT

## 2022-07-10 PROCEDURE — 36415 COLL VENOUS BLD VENIPUNCTURE: CPT

## 2022-07-10 PROCEDURE — 6370000000 HC RX 637 (ALT 250 FOR IP): Performed by: INTERNAL MEDICINE

## 2022-07-10 PROCEDURE — 94761 N-INVAS EAR/PLS OXIMETRY MLT: CPT

## 2022-07-10 PROCEDURE — 2580000003 HC RX 258: Performed by: INTERNAL MEDICINE

## 2022-07-10 PROCEDURE — 2700000000 HC OXYGEN THERAPY PER DAY

## 2022-07-10 PROCEDURE — 99232 SBSQ HOSP IP/OBS MODERATE 35: CPT | Performed by: INTERNAL MEDICINE

## 2022-07-10 PROCEDURE — 85025 COMPLETE CBC W/AUTO DIFF WBC: CPT

## 2022-07-10 PROCEDURE — 6360000002 HC RX W HCPCS: Performed by: INTERNAL MEDICINE

## 2022-07-10 PROCEDURE — 83735 ASSAY OF MAGNESIUM: CPT

## 2022-07-10 PROCEDURE — 94640 AIRWAY INHALATION TREATMENT: CPT

## 2022-07-10 PROCEDURE — 2060000000 HC ICU INTERMEDIATE R&B

## 2022-07-10 PROCEDURE — 84100 ASSAY OF PHOSPHORUS: CPT

## 2022-07-10 PROCEDURE — 80048 BASIC METABOLIC PNL TOTAL CA: CPT

## 2022-07-10 RX ADMIN — GABAPENTIN 100 MG: 100 CAPSULE ORAL at 20:44

## 2022-07-10 RX ADMIN — TRAZODONE HYDROCHLORIDE 25 MG: 50 TABLET ORAL at 20:44

## 2022-07-10 RX ADMIN — INSULIN LISPRO 1 UNITS: 100 INJECTION, SOLUTION INTRAVENOUS; SUBCUTANEOUS at 09:16

## 2022-07-10 RX ADMIN — IPRATROPIUM BROMIDE AND ALBUTEROL SULFATE 1 AMPULE: .5; 3 SOLUTION RESPIRATORY (INHALATION) at 12:09

## 2022-07-10 RX ADMIN — OXYCODONE AND ACETAMINOPHEN 1 TABLET: 5; 325 TABLET ORAL at 13:28

## 2022-07-10 RX ADMIN — HEPARIN SODIUM 5000 UNITS: 5000 INJECTION INTRAVENOUS; SUBCUTANEOUS at 06:28

## 2022-07-10 RX ADMIN — Medication 2 CAPSULE: at 20:43

## 2022-07-10 RX ADMIN — GABAPENTIN 100 MG: 100 CAPSULE ORAL at 09:15

## 2022-07-10 RX ADMIN — SODIUM CHLORIDE, PRESERVATIVE FREE 10 ML: 5 INJECTION INTRAVENOUS at 09:15

## 2022-07-10 RX ADMIN — OXYCODONE AND ACETAMINOPHEN 1 TABLET: 5; 325 TABLET ORAL at 06:28

## 2022-07-10 RX ADMIN — SODIUM CHLORIDE, PRESERVATIVE FREE 10 ML: 5 INJECTION INTRAVENOUS at 20:48

## 2022-07-10 RX ADMIN — METHYLPREDNISOLONE SODIUM SUCCINATE 40 MG: 40 INJECTION, POWDER, FOR SOLUTION INTRAMUSCULAR; INTRAVENOUS at 09:15

## 2022-07-10 RX ADMIN — PANTOPRAZOLE SODIUM 40 MG: 40 TABLET, DELAYED RELEASE ORAL at 09:15

## 2022-07-10 RX ADMIN — IPRATROPIUM BROMIDE AND ALBUTEROL SULFATE 1 AMPULE: .5; 3 SOLUTION RESPIRATORY (INHALATION) at 19:41

## 2022-07-10 RX ADMIN — OXYCODONE AND ACETAMINOPHEN 1 TABLET: 5; 325 TABLET ORAL at 19:27

## 2022-07-10 RX ADMIN — BUDESONIDE 500 MCG: 0.5 SUSPENSION RESPIRATORY (INHALATION) at 07:30

## 2022-07-10 RX ADMIN — HEPARIN SODIUM 5000 UNITS: 5000 INJECTION INTRAVENOUS; SUBCUTANEOUS at 21:52

## 2022-07-10 RX ADMIN — Medication 2 CAPSULE: at 09:15

## 2022-07-10 RX ADMIN — METHYLPREDNISOLONE SODIUM SUCCINATE 40 MG: 40 INJECTION, POWDER, FOR SOLUTION INTRAMUSCULAR; INTRAVENOUS at 20:43

## 2022-07-10 RX ADMIN — INSULIN LISPRO 1 UNITS: 100 INJECTION, SOLUTION INTRAVENOUS; SUBCUTANEOUS at 11:58

## 2022-07-10 RX ADMIN — IPRATROPIUM BROMIDE AND ALBUTEROL SULFATE 1 AMPULE: .5; 3 SOLUTION RESPIRATORY (INHALATION) at 15:40

## 2022-07-10 RX ADMIN — IPRATROPIUM BROMIDE AND ALBUTEROL SULFATE 1 AMPULE: .5; 3 SOLUTION RESPIRATORY (INHALATION) at 07:30

## 2022-07-10 RX ADMIN — BUDESONIDE 500 MCG: 0.5 SUSPENSION RESPIRATORY (INHALATION) at 19:41

## 2022-07-10 RX ADMIN — ATORVASTATIN CALCIUM 40 MG: 40 TABLET, FILM COATED ORAL at 09:15

## 2022-07-10 RX ADMIN — INSULIN LISPRO 1 UNITS: 100 INJECTION, SOLUTION INTRAVENOUS; SUBCUTANEOUS at 17:39

## 2022-07-10 RX ADMIN — DULOXETINE HYDROCHLORIDE 60 MG: 60 CAPSULE, DELAYED RELEASE ORAL at 09:14

## 2022-07-10 RX ADMIN — LEVOFLOXACIN 750 MG: 500 TABLET, FILM COATED ORAL at 09:15

## 2022-07-10 RX ADMIN — HEPARIN SODIUM 5000 UNITS: 5000 INJECTION INTRAVENOUS; SUBCUTANEOUS at 13:28

## 2022-07-10 ASSESSMENT — PAIN DESCRIPTION - PAIN TYPE: TYPE: CHRONIC PAIN

## 2022-07-10 ASSESSMENT — PAIN SCALES - GENERAL
PAINLEVEL_OUTOF10: 0
PAINLEVEL_OUTOF10: 10

## 2022-07-10 ASSESSMENT — PAIN DESCRIPTION - LOCATION
LOCATION: BACK
LOCATION: BACK

## 2022-07-10 ASSESSMENT — PAIN DESCRIPTION - ORIENTATION: ORIENTATION: LOWER

## 2022-07-10 ASSESSMENT — PAIN DESCRIPTION - DESCRIPTORS: DESCRIPTORS: DISCOMFORT;ACHING

## 2022-07-10 ASSESSMENT — PAIN - FUNCTIONAL ASSESSMENT: PAIN_FUNCTIONAL_ASSESSMENT: PREVENTS OR INTERFERES SOME ACTIVE ACTIVITIES AND ADLS

## 2022-07-10 ASSESSMENT — PAIN DESCRIPTION - FREQUENCY: FREQUENCY: CONTINUOUS

## 2022-07-10 ASSESSMENT — PAIN DESCRIPTION - ONSET: ONSET: ON-GOING

## 2022-07-10 NOTE — PLAN OF CARE
Problem: Pain  Goal: Verbalizes/displays adequate comfort level or baseline comfort level  Outcome: Progressing     Problem: Skin/Tissue Integrity  Goal: Absence of new skin breakdown  Description: 1. Monitor for areas of redness and/or skin breakdown  2. Assess vascular access sites hourly  3. Every 4-6 hours minimum:  Change oxygen saturation probe site  4. Every 4-6 hours:  If on nasal continuous positive airway pressure, respiratory therapy assess nares and determine need for appliance change or resting period.   Outcome: Progressing     Problem: Safety - Adult  Goal: Free from fall injury  Outcome: Progressing     Problem: ABCDS Injury Assessment  Goal: Absence of physical injury  Outcome: Progressing     Problem: Chronic Conditions and Co-morbidities  Goal: Patient's chronic conditions and co-morbidity symptoms are monitored and maintained or improved  Outcome: Progressing

## 2022-07-10 NOTE — PROGRESS NOTES
Hospitalist Progress Note      PCP: HECTOR Corea - CNP    Date of Admission: 7/3/2022        Subjective: feels better. Down to 6 liters  No sob. Feels weak        Medications:  Reviewed    Infusion Medications    sodium chloride      dextrose       Scheduled Medications    levoFLOXacin  750 mg Oral Daily    budesonide  0.5 mg Nebulization BID    lactobacillus  2 capsule Oral BID    heparin (porcine)  5,000 Units SubCUTAneous 3 times per day    DULoxetine  60 mg Oral Daily    traZODone  25 mg Oral Nightly    sodium chloride flush  5-40 mL IntraVENous 2 times per day    methylPREDNISolone  40 mg IntraVENous Q12H    ipratropium-albuterol  1 ampule Inhalation Q4H WA    insulin lispro  0-6 Units SubCUTAneous TID WC    insulin lispro  0-3 Units SubCUTAneous Nightly    atorvastatin  40 mg Oral Daily    gabapentin  100 mg Oral BID    pantoprazole  40 mg Oral Daily     PRN Meds: benzocaine-menthol, oxyCODONE-acetaminophen, sodium chloride flush, sodium chloride, ondansetron **OR** ondansetron, polyethylene glycol, acetaminophen **OR** acetaminophen, glucose, dextrose bolus **OR** dextrose bolus, glucagon (rDNA), dextrose, albuterol sulfate HFA      Intake/Output Summary (Last 24 hours) at 7/10/2022 1629  Last data filed at 7/10/2022 1526  Gross per 24 hour   Intake 240 ml   Output 675 ml   Net -435 ml       Physical Exam Performed:    /68   Pulse 98   Temp 97.4 °F (36.3 °C) (Oral)   Resp 17   Ht 5' 4\" (1.626 m)   Wt 135 lb 5.8 oz (61.4 kg)   SpO2 93%   BMI 23.23 kg/m²     General appearance: Mildly dyspneic  Neck: Supple  Respiratory: Improved aeration compared to 7/8/2022 exam  Cardiovascular: Regular rate and rhythm with normal S1/S2 without murmurs, rubs or gallops. Abdomen: Soft, non-tender, non-distended   Musculoskeletal: No clubbing, cyanosis   Skin: Skin color, texture, turgor normal.  No rashes or lesions.   Neurologic: No Focal weakness  Psychiatric: Alert and oriented  Capillary Refill: Brisk,3 seconds, normal   Peripheral Pulses: +2 palpable, equal bilaterally       Labs:   Recent Labs     07/08/22  0356 07/09/22  0614 07/10/22  0347   WBC 14.8* 14.1* 13.8*   HGB 11.6* 12.3 12.1   HCT 34.0* 36.5 36.0    214 238     Recent Labs     07/08/22  0356 07/09/22  0614 07/10/22  0347    137 136   K 3.6 3.7 3.7   CL 96* 96* 95*   CO2 30 31 31   BUN 16 21* 23*   CREATININE 0.6 0.6 0.6   CALCIUM 7.6* 8.1* 8.1*   PHOS 2.2* 2.5 3.1     No results for input(s): AST, ALT, BILIDIR, BILITOT, ALKPHOS in the last 72 hours. No results for input(s): INR in the last 72 hours. No results for input(s): Curlie Lat in the last 72 hours. Urinalysis:      Lab Results   Component Value Date/Time    NITRU Negative 07/03/2022 03:11 PM    WBCUA 3-5 07/03/2022 03:11 PM    BACTERIA 4+ 07/03/2022 03:11 PM    RBCUA 0-2 07/03/2022 03:11 PM    BLOODU Negative 07/03/2022 03:11 PM    SPECGRAV 1.022 07/03/2022 03:11 PM    GLUCOSEU Negative 07/03/2022 03:11 PM       Radiology:  XR CHEST PORTABLE   Final Result   Airspace disease noted diffusely throughout the right lung with worsening   airspace disease within the right lower lobe. No definitive pleural effusion. XR CHEST PORTABLE   Final Result   Severe right upper lobe pneumonia, increased since the prior exam.         XR CHEST PORTABLE   Final Result   Diffuse asymmetric right-sided airspace disease, greatest in the perihilar   region, which may reflect pneumonia or asymmetric pulmonary edema.                  Assessment/Plan:    Active Hospital Problems    Diagnosis     Acute respiratory failure with hypoxia ([facility] [address] [J96.01]      Priority: Medium    Community acquired pneumonia of right lung [J18.9]      Priority: Medium    Chronic obstructive pulmonary disease ([facility] [address] [J44.9]      Priority: Medium    Pneumonia of right upper lobe due to infectious organism [J18.9]      Priority: Medium    General weakness [R53.1] Priority: Medium    Elevated lactic acid level [R79.89]      Priority: Medium    Diabetes ([facility] [address] [E11.9]     COPD exacerbation ([facility] [J44.1]     Severe sepsis ([facility]) [A41.9, R65.20]     ROLY (acute kidney injury) ([facility] [address] [N17.9]     Acute on chronic respiratory failure with hypercapnia ([facility]) [J96.22]        Severe sepsis, pneumonia, admitted to ICU for low blood pressure-  Off cefipime  Now on levaquin   May Require more prolonged antibiotics per Dr. Garrett  Off pressors    Acute hypoxic respiratory failure  Better. Down to 6 liters    Pseudomonal pneumonia  Completed cefipime  On levaquin    COPD with exacerbation  Remains on moderate oxygen  At risk for intubation  On Solu-Medrol  DuoNebs    Diabetes mellitus type 2-  Continue Lantus and Humalog        DVT Prophylaxis: Lovenox  Diet: ADULT DIET;  Regular; 4 carb choices (60 gm/meal)  Code Status: Full Code      Dispo -another 24 to 48 hours of inpatient care predicted  Will likely need a higher level of care at discharge    Echo Mays MD

## 2022-07-10 NOTE — PROGRESS NOTES
Pulmonary Progress Note    CC:  Follow up pneumonia, COPD    Subjective:  HF oxygen at 9 liters  She thinks she is starting to feel a little bit better  Still with productive cough  Still with pain       Intake/Output Summary (Last 24 hours) at 7/10/2022 0733  Last data filed at 7/9/2022 2330  Gross per 24 hour   Intake --   Output 375 ml   Net -375 ml         PHYSICAL EXAM:  Blood pressure (!) 176/84, pulse 65, temperature 97.8 °F (36.6 °C), temperature source Oral, resp. rate 26, height 5' 4\" (1.626 m), weight 135 lb 5.8 oz (61.4 kg), SpO2 96 %, not currently breastfeeding.'  Gen: Looks better today   Eyes: PERRL. No sclera icterus. No conjunctival injection. ENT: No discharge. Pharynx clear. External appearance of ears and nose normal.  Neck: Trachea midline. No obvious mass. Resp: Rhonchi  CV: Regular rate. Regular rhythm. No murmur or rub. GI: Non-tender. Non-distended. No hernia. Skin: Warm, dry, normal texture and turgor. No nodule on exposed extremities. Lymph: No cervical LAD. No supraclavicular LAD. M/S: No cyanosis. No clubbing. No joint deformity. Neuro: Moves all four extremities. CN 2-12 tested, no defect noted.   Ext:   no edema    Medications:    Scheduled Meds:   levoFLOXacin  750 mg Oral Daily    budesonide  0.5 mg Nebulization BID    lactobacillus  2 capsule Oral BID    heparin (porcine)  5,000 Units SubCUTAneous 3 times per day    DULoxetine  60 mg Oral Daily    traZODone  25 mg Oral Nightly    sodium chloride flush  5-40 mL IntraVENous 2 times per day    methylPREDNISolone  40 mg IntraVENous Q12H    ipratropium-albuterol  1 ampule Inhalation Q4H WA    insulin lispro  0-6 Units SubCUTAneous TID WC    insulin lispro  0-3 Units SubCUTAneous Nightly    atorvastatin  40 mg Oral Daily    gabapentin  100 mg Oral BID    pantoprazole  40 mg Oral Daily       Continuous Infusions:   sodium chloride      dextrose         PRN Meds:  benzocaine-menthol, oxyCODONE-acetaminophen, sodium chloride flush, sodium chloride, ondansetron **OR** ondansetron, polyethylene glycol, acetaminophen **OR** acetaminophen, glucose, dextrose bolus **OR** dextrose bolus, glucagon (rDNA), dextrose, albuterol sulfate HFA    Labs:  CBC:   Recent Labs     22  0356 22  0614 07/10/22  0347   WBC 14.8* 14.1* 13.8*   HGB 11.6* 12.3 12.1   HCT 34.0* 36.5 36.0   MCV 95.0 95.0 95.7    214 238     BMP:   Recent Labs     22  0356 22  0614 07/10/22  0347    137 136   K 3.6 3.7 3.7   CL 96* 96* 95*   CO2 30 31 31   PHOS 2.2* 2.5 3.1   BUN 16 21* 23*   CREATININE 0.6 0.6 0.6     LIVER PROFILE:   No results for input(s): AST, ALT, LIPASE, BILIDIR, BILITOT, ALKPHOS in the last 72 hours. Invalid input(s): AMYLASE,  ALB  PT/INR: No results for input(s): PROTIME, INR in the last 72 hours. APTT: No results for input(s): APTT in the last 72 hours. UA:  No results for input(s): NITRITE, COLORU, PHUR, LABCAST, WBCUA, RBCUA, MUCUS, TRICHOMONAS, YEAST, BACTERIA, CLARITYU, SPECGRAV, LEUKOCYTESUR, UROBILINOGEN, BILIRUBINUR, BLOODU, GLUCOSEU, AMORPHOUS in the last 72 hours. Invalid input(s): Mozier Littler  No results for input(s): PH, PCO2, PO2 in the last 72 hours. Films:  Chest imaging reports were reviewed and imaging was reviewed by me and showed significant disease in the right lung with relative sparing of the left lung     AB.    Cultures:  Sputum:  Pseudomonas   Antigens:  Negative    I reviewed the labs and images listed above    Assessment/Plan:    Acute Hypoxic Respiratory Failure with saturations less than 90% on room air with oxygen requirements increasing   Titrate oxygen for saturations greater than or equal to 90%  o Home oxygen assessment prior to DC.   Could go home on oxygen once requirements are around 3 liters   Acute on Chronic Hypercapnic Respiratory Failure, at baseline    Severe Sepsis due to pseudomonas   o Completed 7 days of cefepime, now 7 days of levaquin (at minimum). May have to extend course past 14 days  o Continue Lactobacillus    RUL pneumonia:  Pseudomonas   o 14 days of therapy. See above   Moderately Severe COPD   o Pulmiocort nebs  o Duonebs q 4 hours  o IV solumedrol q 12 hours until we start seeing rapid improvement in oxygen requirements.   Probably prednisone tomorrow (7/11)   Tobacco Abuse  o Must quit    DVT prophylaxis  Heparin     Increase activity     Prasanth Begum, DO  [facility]

## 2022-07-10 NOTE — PLAN OF CARE
Pain/discomfort being managed with PRN analgesics per MD orders. Pt able to express presence and absence of pain and rate pain appropriately using numerical scale. Fall risk precautions in place. Bed in lowest position with wheels locked,bed alarm in place and activated,non-skid socks on pt, fall risk ID on pt, call light in reach, will continue to monitor. Skin assessment completed every shift. Pt assessed for incontinence, appropriate barrier cream applied prn. Pt encouraged to turn/rotate every 2 hours. Assistance provided if pt unable to do so themselves. Intake/Output Summary (Last 24 hours) at 7/10/2022 0727  Last data filed at 7/9/2022 2330  Gross per 24 hour   Intake --   Output 375 ml   Net -375 ml     Vitals:    07/10/22 0700   BP:    Pulse:    Resp:    Temp: 97.8 °F (36.6 °C)   SpO2:      Patient assessed for fall risk; fall precautions initiated. Patient and family instructed about safety devices. Environment kept free of clutter and adequate lighting provided. Bed locked and in lowest position. Call light within reach. Will continue to monitor.

## 2022-07-11 LAB
ANION GAP SERPL CALCULATED.3IONS-SCNC: 10 MMOL/L (ref 3–16)
BASOPHILS ABSOLUTE: 0 K/UL (ref 0–0.2)
BASOPHILS RELATIVE PERCENT: 0 %
BUN BLDV-MCNC: 21 MG/DL (ref 7–20)
CALCIUM SERPL-MCNC: 8.2 MG/DL (ref 8.3–10.6)
CHLORIDE BLD-SCNC: 95 MMOL/L (ref 99–110)
CO2: 32 MMOL/L (ref 21–32)
CREAT SERPL-MCNC: 0.5 MG/DL (ref 0.6–1.2)
EOSINOPHILS ABSOLUTE: 0 K/UL (ref 0–0.6)
EOSINOPHILS RELATIVE PERCENT: 0 %
GFR AFRICAN AMERICAN: >60
GFR NON-AFRICAN AMERICAN: >60
GLUCOSE BLD-MCNC: 112 MG/DL (ref 70–99)
GLUCOSE BLD-MCNC: 116 MG/DL (ref 70–99)
GLUCOSE BLD-MCNC: 141 MG/DL (ref 70–99)
GLUCOSE BLD-MCNC: 171 MG/DL (ref 70–99)
GLUCOSE BLD-MCNC: 176 MG/DL (ref 70–99)
GLUCOSE BLD-MCNC: 192 MG/DL (ref 70–99)
HCT VFR BLD CALC: 34.9 % (ref 36–48)
HEMOGLOBIN: 11.7 G/DL (ref 12–16)
LYMPHOCYTES ABSOLUTE: 0.7 K/UL (ref 1–5.1)
LYMPHOCYTES RELATIVE PERCENT: 5 %
MAGNESIUM: 1.7 MG/DL (ref 1.8–2.4)
MCH RBC QN AUTO: 32.2 PG (ref 26–34)
MCHC RBC AUTO-ENTMCNC: 33.6 G/DL (ref 31–36)
MCV RBC AUTO: 95.8 FL (ref 80–100)
MONOCYTES ABSOLUTE: 0 K/UL (ref 0–1.3)
MONOCYTES RELATIVE PERCENT: 0 %
NEUTROPHILS ABSOLUTE: 14.1 K/UL (ref 1.7–7.7)
NEUTROPHILS RELATIVE PERCENT: 95 %
PDW BLD-RTO: 14.2 % (ref 12.4–15.4)
PERFORMED ON: ABNORMAL
PHOSPHORUS: 3.8 MG/DL (ref 2.5–4.9)
PLATELET # BLD: 225 K/UL (ref 135–450)
PLATELET SLIDE REVIEW: ADEQUATE
PMV BLD AUTO: 9.3 FL (ref 5–10.5)
POTASSIUM SERPL-SCNC: 4 MMOL/L (ref 3.5–5.1)
RBC # BLD: 3.64 M/UL (ref 4–5.2)
RBC # BLD: NORMAL 10*6/UL
SLIDE REVIEW: ABNORMAL
SODIUM BLD-SCNC: 137 MMOL/L (ref 136–145)
TOXIC GRANULATION: PRESENT
VACUOLATED NEUTROPHILS: PRESENT
WBC # BLD: 14.8 K/UL (ref 4–11)

## 2022-07-11 PROCEDURE — 99232 SBSQ HOSP IP/OBS MODERATE 35: CPT | Performed by: INTERNAL MEDICINE

## 2022-07-11 PROCEDURE — 84100 ASSAY OF PHOSPHORUS: CPT

## 2022-07-11 PROCEDURE — 6370000000 HC RX 637 (ALT 250 FOR IP): Performed by: INTERNAL MEDICINE

## 2022-07-11 PROCEDURE — 2580000003 HC RX 258: Performed by: INTERNAL MEDICINE

## 2022-07-11 PROCEDURE — 85025 COMPLETE CBC W/AUTO DIFF WBC: CPT

## 2022-07-11 PROCEDURE — 36415 COLL VENOUS BLD VENIPUNCTURE: CPT

## 2022-07-11 PROCEDURE — 2700000000 HC OXYGEN THERAPY PER DAY

## 2022-07-11 PROCEDURE — 6360000002 HC RX W HCPCS: Performed by: INTERNAL MEDICINE

## 2022-07-11 PROCEDURE — 94640 AIRWAY INHALATION TREATMENT: CPT

## 2022-07-11 PROCEDURE — 2060000000 HC ICU INTERMEDIATE R&B

## 2022-07-11 PROCEDURE — 94761 N-INVAS EAR/PLS OXIMETRY MLT: CPT

## 2022-07-11 PROCEDURE — 80048 BASIC METABOLIC PNL TOTAL CA: CPT

## 2022-07-11 PROCEDURE — 94669 MECHANICAL CHEST WALL OSCILL: CPT

## 2022-07-11 PROCEDURE — 83735 ASSAY OF MAGNESIUM: CPT

## 2022-07-11 RX ORDER — PREDNISONE 20 MG/1
40 TABLET ORAL DAILY
Status: DISCONTINUED | OUTPATIENT
Start: 2022-07-11 | End: 2022-07-13 | Stop reason: HOSPADM

## 2022-07-11 RX ADMIN — OXYCODONE AND ACETAMINOPHEN 1 TABLET: 5; 325 TABLET ORAL at 20:14

## 2022-07-11 RX ADMIN — HEPARIN SODIUM 5000 UNITS: 5000 INJECTION INTRAVENOUS; SUBCUTANEOUS at 14:10

## 2022-07-11 RX ADMIN — INSULIN LISPRO 1 UNITS: 100 INJECTION, SOLUTION INTRAVENOUS; SUBCUTANEOUS at 21:39

## 2022-07-11 RX ADMIN — INSULIN LISPRO 1 UNITS: 100 INJECTION, SOLUTION INTRAVENOUS; SUBCUTANEOUS at 17:33

## 2022-07-11 RX ADMIN — IPRATROPIUM BROMIDE AND ALBUTEROL SULFATE 1 AMPULE: .5; 3 SOLUTION RESPIRATORY (INHALATION) at 15:49

## 2022-07-11 RX ADMIN — HEPARIN SODIUM 5000 UNITS: 5000 INJECTION INTRAVENOUS; SUBCUTANEOUS at 20:15

## 2022-07-11 RX ADMIN — IPRATROPIUM BROMIDE AND ALBUTEROL SULFATE 1 AMPULE: .5; 3 SOLUTION RESPIRATORY (INHALATION) at 11:27

## 2022-07-11 RX ADMIN — SALINE NASAL SPRAY 1 SPRAY: 1.5 SOLUTION NASAL at 12:18

## 2022-07-11 RX ADMIN — GABAPENTIN 100 MG: 100 CAPSULE ORAL at 20:15

## 2022-07-11 RX ADMIN — ACETAMINOPHEN 650 MG: 325 TABLET ORAL at 17:36

## 2022-07-11 RX ADMIN — OXYCODONE AND ACETAMINOPHEN 1 TABLET: 5; 325 TABLET ORAL at 14:09

## 2022-07-11 RX ADMIN — OXYCODONE AND ACETAMINOPHEN 1 TABLET: 5; 325 TABLET ORAL at 06:23

## 2022-07-11 RX ADMIN — PANTOPRAZOLE SODIUM 40 MG: 40 TABLET, DELAYED RELEASE ORAL at 08:35

## 2022-07-11 RX ADMIN — Medication 2 CAPSULE: at 08:34

## 2022-07-11 RX ADMIN — BUDESONIDE 500 MCG: 0.5 SUSPENSION RESPIRATORY (INHALATION) at 20:36

## 2022-07-11 RX ADMIN — SODIUM CHLORIDE, PRESERVATIVE FREE 10 ML: 5 INJECTION INTRAVENOUS at 08:35

## 2022-07-11 RX ADMIN — ACETAMINOPHEN 650 MG: 325 TABLET ORAL at 08:35

## 2022-07-11 RX ADMIN — GABAPENTIN 100 MG: 100 CAPSULE ORAL at 08:34

## 2022-07-11 RX ADMIN — LEVOFLOXACIN 750 MG: 500 TABLET, FILM COATED ORAL at 08:34

## 2022-07-11 RX ADMIN — INSULIN LISPRO 1 UNITS: 100 INJECTION, SOLUTION INTRAVENOUS; SUBCUTANEOUS at 08:35

## 2022-07-11 RX ADMIN — IPRATROPIUM BROMIDE AND ALBUTEROL SULFATE 1 AMPULE: .5; 3 SOLUTION RESPIRATORY (INHALATION) at 20:36

## 2022-07-11 RX ADMIN — HEPARIN SODIUM 5000 UNITS: 5000 INJECTION INTRAVENOUS; SUBCUTANEOUS at 06:17

## 2022-07-11 RX ADMIN — PREDNISONE 40 MG: 20 TABLET ORAL at 08:34

## 2022-07-11 RX ADMIN — IPRATROPIUM BROMIDE AND ALBUTEROL SULFATE 1 AMPULE: .5; 3 SOLUTION RESPIRATORY (INHALATION) at 07:51

## 2022-07-11 RX ADMIN — ACETAMINOPHEN 650 MG: 325 TABLET ORAL at 23:48

## 2022-07-11 RX ADMIN — DULOXETINE HYDROCHLORIDE 60 MG: 60 CAPSULE, DELAYED RELEASE ORAL at 08:34

## 2022-07-11 RX ADMIN — BUDESONIDE 500 MCG: 0.5 SUSPENSION RESPIRATORY (INHALATION) at 07:51

## 2022-07-11 RX ADMIN — Medication 2 CAPSULE: at 20:14

## 2022-07-11 RX ADMIN — SODIUM CHLORIDE, PRESERVATIVE FREE 10 ML: 5 INJECTION INTRAVENOUS at 20:17

## 2022-07-11 RX ADMIN — ATORVASTATIN CALCIUM 40 MG: 40 TABLET, FILM COATED ORAL at 08:35

## 2022-07-11 ASSESSMENT — PAIN SCALES - GENERAL
PAINLEVEL_OUTOF10: 10
PAINLEVEL_OUTOF10: 10
PAINLEVEL_OUTOF10: 9
PAINLEVEL_OUTOF10: 8
PAINLEVEL_OUTOF10: 10

## 2022-07-11 ASSESSMENT — PAIN DESCRIPTION - LOCATION
LOCATION: BACK;ANKLE
LOCATION: ANKLE;BACK
LOCATION: BACK
LOCATION: ANKLE
LOCATION: BACK;ANKLE
LOCATION: GENERALIZED

## 2022-07-11 ASSESSMENT — PAIN DESCRIPTION - FREQUENCY: FREQUENCY: CONTINUOUS

## 2022-07-11 ASSESSMENT — PAIN - FUNCTIONAL ASSESSMENT
PAIN_FUNCTIONAL_ASSESSMENT: ACTIVITIES ARE NOT PREVENTED

## 2022-07-11 ASSESSMENT — PAIN DESCRIPTION - ONSET: ONSET: ON-GOING

## 2022-07-11 ASSESSMENT — PAIN DESCRIPTION - ORIENTATION
ORIENTATION: LOWER
ORIENTATION: MID
ORIENTATION: RIGHT;LEFT
ORIENTATION: MID
ORIENTATION: RIGHT;LEFT;MID

## 2022-07-11 ASSESSMENT — PAIN DESCRIPTION - DESCRIPTORS
DESCRIPTORS: ACHING
DESCRIPTORS: PINS AND NEEDLES
DESCRIPTORS: ACHING
DESCRIPTORS: ACHING
DESCRIPTORS: ACHING;TINGLING;PINS AND NEEDLES

## 2022-07-11 ASSESSMENT — PAIN DESCRIPTION - PAIN TYPE: TYPE: CHRONIC PAIN

## 2022-07-11 NOTE — PROGRESS NOTES
Pulmonary Progress Note    CC:  Follow up pneumonia, COPD    Subjective:  5 liters of oxygen   Still with some mucus  Feels very weak      Intake/Output Summary (Last 24 hours) at 7/11/2022 0756  Last data filed at 7/11/2022 0355  Gross per 24 hour   Intake 720 ml   Output 300 ml   Net 420 ml         PHYSICAL EXAM:  Blood pressure (!) 150/86, pulse 97, temperature 98.3 °F (36.8 °C), temperature source Oral, resp. rate 21, height 5' 4\" (1.626 m), weight 135 lb 2.3 oz (61.3 kg), SpO2 91 %, not currently breastfeeding.'  Gen: NAD  Eyes: PERRL. No sclera icterus. No conjunctival injection. ENT: No discharge. Pharynx clear. External appearance of ears and nose normal.  Neck: Trachea midline. No obvious mass. Resp: Rhonchi  CV: Regular rate. Regular rhythm. No murmur or rub. GI: Non-tender. Non-distended. No hernia. Skin: Warm, dry, normal texture and turgor. No nodule on exposed extremities. Lymph: No cervical LAD. No supraclavicular LAD. M/S: No cyanosis. No clubbing. No joint deformity. Neuro: Moves all four extremities. CN 2-12 tested, no defect noted.   Ext:   no edema    Medications:    Scheduled Meds:   levoFLOXacin  750 mg Oral Daily    budesonide  0.5 mg Nebulization BID    lactobacillus  2 capsule Oral BID    heparin (porcine)  5,000 Units SubCUTAneous 3 times per day    DULoxetine  60 mg Oral Daily    traZODone  25 mg Oral Nightly    sodium chloride flush  5-40 mL IntraVENous 2 times per day    methylPREDNISolone  40 mg IntraVENous Q12H    ipratropium-albuterol  1 ampule Inhalation Q4H WA    insulin lispro  0-6 Units SubCUTAneous TID WC    insulin lispro  0-3 Units SubCUTAneous Nightly    atorvastatin  40 mg Oral Daily    gabapentin  100 mg Oral BID    pantoprazole  40 mg Oral Daily       Continuous Infusions:   sodium chloride      dextrose         PRN Meds:  benzocaine-menthol, oxyCODONE-acetaminophen, sodium chloride flush, sodium chloride, ondansetron **OR** ondansetron, polyethylene glycol, acetaminophen **OR** acetaminophen, glucose, dextrose bolus **OR** dextrose bolus, glucagon (rDNA), dextrose, albuterol sulfate HFA    Labs:  CBC:   Recent Labs     22  0614 07/10/22  0347 07/11/22  0321   WBC 14.1* 13.8* 14.8*   HGB 12.3 12.1 11.7*   HCT 36.5 36.0 34.9*   MCV 95.0 95.7 95.8    238 225     BMP:   Recent Labs     22  0614 07/10/22  0347 22  0321    136 137   K 3.7 3.7 4.0   CL 96* 95* 95*   CO2 31 31 32   PHOS 2.5 3.1 3.8   BUN 21* 23* 21*   CREATININE 0.6 0.6 0.5*     LIVER PROFILE:   No results for input(s): AST, ALT, LIPASE, BILIDIR, BILITOT, ALKPHOS in the last 72 hours. Invalid input(s): AMYLASE,  ALB  PT/INR: No results for input(s): PROTIME, INR in the last 72 hours. APTT: No results for input(s): APTT in the last 72 hours. UA:  No results for input(s): NITRITE, COLORU, PHUR, LABCAST, WBCUA, RBCUA, MUCUS, TRICHOMONAS, YEAST, BACTERIA, CLARITYU, SPECGRAV, LEUKOCYTESUR, UROBILINOGEN, BILIRUBINUR, BLOODU, GLUCOSEU, AMORPHOUS in the last 72 hours. Invalid input(s): Marig[employer] Safe  No results for input(s): PH, PCO2, PO2 in the last 72 hours. Films:  Chest imaging reports were reviewed and imaging was reviewed by me and showed significant disease in the right lung with relative sparing of the left lung     AB.    Cultures:  Sputum:  Pseudomonas   Antigens:  Negative    I reviewed the labs and images listed above    Assessment/Plan:    Acute Hypoxic Respiratory Failure with saturations less than 90% on room air with oxygen requirements increasing   Titrate oxygen for saturations greater than or equal to 90%  o Home oxygen assessment prior to DC.  Acute on Chronic Hypercapnic Respiratory Failure, at baseline    Severe Sepsis due to pseudomonas   o Completed 7 days of cefepime, now on levaquin. Day# 11.  RUL pneumonia:  Pseudomonas   o 14 days of therapy.   See above   Moderately Severe COPD   o Pulmiocort nebs  o Duonebs q 4 hours  o DC IV solumedrol  o Prednisone for 5 days    Tobacco Abuse  o Must quit    DVT prophylaxis  Heparin     Start PT/OT today     Could go home when on 3 liters of oxygen or less  Follows with Dr. Huma Gillis DO  [facility]

## 2022-07-11 NOTE — PLAN OF CARE
Problem: Discharge Planning  Goal: Discharge to home or other facility with appropriate resources  7/11/2022 1038 by Denise Castro RN  Outcome: Progressing     Problem: Pain  Goal: Verbalizes/displays adequate comfort level or baseline comfort level  7/11/2022 1038 by Denise Castro RN  Outcome: Progressing     Problem: Skin/Tissue Integrity  Goal: Absence of new skin breakdown  Description: 1. Monitor for areas of redness and/or skin breakdown  2. Assess vascular access sites hourly  3. Every 4-6 hours minimum:  Change oxygen saturation probe site  4. Every 4-6 hours:  If on nasal continuous positive airway pressure, respiratory therapy assess nares and determine need for appliance change or resting period.   7/11/2022 1038 by Denise Castro RN  Outcome: Progressing     Problem: Safety - Adult  Goal: Free from fall injury  7/11/2022 1038 by Denise Castro RN  Outcome: Progressing  Flowsheets (Taken 7/11/2022 1036)  Free From Fall Injury: Instruct family/caregiver on patient safety     Problem: ABCDS Injury Assessment  Goal: Absence of physical injury  7/11/2022 1038 by Denise Castro RN  Outcome: Progressing  Flowsheets (Taken 7/11/2022 1036)  Absence of Physical Injury: Implement safety measures based on patient assessment     Problem: Chronic Conditions and Co-morbidities  Goal: Patient's chronic conditions and co-morbidity symptoms are monitored and maintained or improved  7/11/2022 1038 by Denise Castro RN  Outcome: Progressing

## 2022-07-11 NOTE — PROGRESS NOTES
oriented  Capillary Refill: Brisk,3 seconds, normal   Peripheral Pulses: +2 palpable, equal bilaterally       Labs:   Recent Labs     07/09/22  0614 07/10/22  0347 07/11/22  0321   WBC 14.1* 13.8* 14.8*   HGB 12.3 12.1 11.7*   HCT 36.5 36.0 34.9*    238 225     Recent Labs     07/09/22  0614 07/10/22  0347 07/11/22  0321    136 137   K 3.7 3.7 4.0   CL 96* 95* 95*   CO2 31 31 32   BUN 21* 23* 21*   CREATININE 0.6 0.6 0.5*   CALCIUM 8.1* 8.1* 8.2*   PHOS 2.5 3.1 3.8     No results for input(s): AST, ALT, BILIDIR, BILITOT, ALKPHOS in the last 72 hours. No results for input(s): INR in the last 72 hours. No results for input(s): Calvin Shorts in the last 72 hours. Urinalysis:      Lab Results   Component Value Date/Time    NITRU Negative 07/03/2022 03:11 PM    WBCUA 3-5 07/03/2022 03:11 PM    BACTERIA 4+ 07/03/2022 03:11 PM    RBCUA 0-2 07/03/2022 03:11 PM    BLOODU Negative 07/03/2022 03:11 PM    SPECGRAV 1.022 07/03/2022 03:11 PM    GLUCOSEU Negative 07/03/2022 03:11 PM       Radiology:  XR CHEST PORTABLE   Final Result   Airspace disease noted diffusely throughout the right lung with worsening   airspace disease within the right lower lobe. No definitive pleural effusion. XR CHEST PORTABLE   Final Result   Severe right upper lobe pneumonia, increased since the prior exam.         XR CHEST PORTABLE   Final Result   Diffuse asymmetric right-sided airspace disease, greatest in the perihilar   region, which may reflect pneumonia or asymmetric pulmonary edema.                  Assessment/Plan:    Active Hospital Problems    Diagnosis     Acute respiratory failure with hypoxia ([facility] [address] [J96.01]      Priority: Medium    Community acquired pneumonia of right lung [J18.9]      Priority: Medium    Chronic obstructive pulmonary disease ([facility] [address] [J44.9]      Priority: Medium    Pneumonia of right upper lobe due to infectious organism [J18.9]      Priority: Medium    General weakness [R53.1] Priority: Medium    Elevated lactic acid level [R79.89]      Priority: Medium    Diabetes ([facility] [address] [E11.9]     COPD exacerbation ([facility]) [J44.1]     Severe sepsis ([facility]) [A41.9, R65.20]     ROLY (acute kidney injury) ([facility] [address] [N17.9]     Acute on chronic respiratory failure with hypercapnia ([facility]) [J96.22]        Severe sepsis, pneumonia, admitted to ICU for low blood pressure-  Off cefipime  Now on levaquin   May Require more prolonged antibiotics per Dr. Fadi Damon  Off pressors    Acute hypoxic respiratory failure  Better. Down to 5 liters    Pseudomonal pneumonia  Completed cefipime  On levaquin    COPD with exacerbation  Remains on moderate oxygen  At risk for intubation  On Solu-Medrol  DuoNebs    Diabetes mellitus type 2-  Continue Lantus and Humalog    Dc likely tomorrow  Wants to go home with [facility] and help of her daughter    DVT Prophylaxis: Lovenox  Diet: ADULT DIET;  Regular; 4 carb choices (60 gm/meal)  ADULT ORAL NUTRITION SUPPLEMENT; Breakfast, Dinner; Diabetic Oral Supplement  ADULT ORAL NUTRITION SUPPLEMENT; Breakfast, Lunch, Dinner; Diabetic Oral Supplement  Code Status: Full Code      Dispo -another 24 to 48 hours of inpatient care predicted  Will likely need a higher level of care at discharge    Excell MD Gila

## 2022-07-11 NOTE — PROGRESS NOTES
Comprehensive Nutrition Assessment    Type and Reason for Visit:  RD Nutrition Re-Screen/LOS    Nutrition Recommendations/Plan:   1. Continue current regular; 4 carb  2. Glucerna BID (vanilla)     Malnutrition Assessment:  Malnutrition Status: At risk for malnutrition (Comment) (07/11/22 1134)    Context:  Acute Illness     Findings of the 6 clinical characteristics of malnutrition:  Energy Intake:  75% or less of estimated energy requirements for 7 or more days  Weight Loss:  No significant weight loss     Body Fat Loss:  No significant body fat loss     Muscle Mass Loss:  No significant muscle mass loss    Fluid Accumulation:  No significant fluid accumulation     Strength:  Not Performed    Nutrition Assessment:    LOS. Pt admitted with AMS and severe sepsis. Pt on oxygen. Hx of COPD, DM, HLD, IBS, ROLY and tobacco abuse. Pt wt is stable. Pt stated poor intake PTA due to sore mouth. Current PO intake variable 1 - 50%. ONS added to increase oral intake. Nutrition Related Findings:    LBM 7/9 Wound Type: None       Current Nutrition Intake & Therapies:    Average Meal Intake: 1-25%,26-50%  Average Supplements Intake: None Ordered  ADULT DIET; Regular; 4 carb choices (60 gm/meal)    Anthropometric Measures:  Height: 5' 4\" (162.6 cm)  Ideal Body Weight (IBW): 120 lbs (55 kg)    Admission Body Weight: 136 lb (61.7 kg)  Current Body Weight: 135 lb (61.2 kg), 112.5 % IBW.  Weight Source: Standing Scale  Current BMI (kg/m2): 23.2        Weight Adjustment For: No Adjustment                 BMI Categories: Normal Weight (BMI 22.0 to 24.9) age over 72    Estimated Daily Nutrient Needs:  Energy Requirements Based On: Kcal/kg  Weight Used for Energy Requirements: Current  Energy (kcal/day): 1525 - 1830 kcals/day (25 - 30 kcal/kg ABW)  Weight Used for Protein Requirements: Current  Protein (g/day): 61 - 92 g/day (1 - 1.5 g/kg ABW)  Method Used for Fluid Requirements: 1 ml/kcal  Fluid (ml/day):      Nutrition Diagnosis: · Inadequate oral intake related to pain (sore mouth) as evidenced by poor intake prior to admission      Nutrition Interventions:   Food and/or Nutrient Delivery: Continue Current Diet,Start Oral Nutrition Supplement  Nutrition Education/Counseling: No recommendation at this time  Coordination of Nutrition Care: Continue to monitor while inpatient       Goals:     Goals: Meet at least 75% of estimated needs       Nutrition Monitoring and Evaluation:   Behavioral-Environmental Outcomes: None Identified  Food/Nutrient Intake Outcomes: Food and Nutrient Intake,Supplement Intake  Physical Signs/Symptoms Outcomes: Biochemical Data,Nutrition Focused Physical Findings,Skin,Weight    Discharge Planning:    No discharge needs at this time     [address]: [address]

## 2022-07-11 NOTE — PLAN OF CARE
Problem: Discharge Planning  Goal: Discharge to home or other facility with appropriate resources  Outcome: Progressing     Problem: Pain  Goal: Verbalizes/displays adequate comfort level or baseline comfort level  Outcome: Progressing     Problem: Skin/Tissue Integrity  Goal: Absence of new skin breakdown  Description: 1. Monitor for areas of redness and/or skin breakdown  2. Assess vascular access sites hourly  3. Every 4-6 hours minimum:  Change oxygen saturation probe site  4. Every 4-6 hours:  If on nasal continuous positive airway pressure, respiratory therapy assess nares and determine need for appliance change or resting period.   Outcome: Progressing     Problem: Safety - Adult  Goal: Free from fall injury  Outcome: Progressing     Problem: ABCDS Injury Assessment  Goal: Absence of physical injury  Outcome: Progressing  Flowsheets (Taken 7/11/2022 1488)  Absence of Physical Injury: Implement safety measures based on patient assessment     Problem: Chronic Conditions and Co-morbidities  Goal: Patient's chronic conditions and co-morbidity symptoms are monitored and maintained or improved  Outcome: Progressing

## 2022-07-12 ENCOUNTER — TELEPHONE (OUTPATIENT)
Dept: PULMONOLOGY | Age: 77
End: 2022-07-12

## 2022-07-12 LAB
ANION GAP SERPL CALCULATED.3IONS-SCNC: 8 MMOL/L (ref 3–16)
BASOPHILS ABSOLUTE: 0 K/UL (ref 0–0.2)
BASOPHILS RELATIVE PERCENT: 0 %
BUN BLDV-MCNC: 20 MG/DL (ref 7–20)
CALCIUM SERPL-MCNC: 8.5 MG/DL (ref 8.3–10.6)
CHLORIDE BLD-SCNC: 95 MMOL/L (ref 99–110)
CO2: 33 MMOL/L (ref 21–32)
CREAT SERPL-MCNC: 0.5 MG/DL (ref 0.6–1.2)
EOSINOPHILS ABSOLUTE: 0.1 K/UL (ref 0–0.6)
EOSINOPHILS RELATIVE PERCENT: 0.4 %
GFR AFRICAN AMERICAN: >60
GFR NON-AFRICAN AMERICAN: >60
GLUCOSE BLD-MCNC: 103 MG/DL (ref 70–99)
GLUCOSE BLD-MCNC: 104 MG/DL (ref 70–99)
GLUCOSE BLD-MCNC: 121 MG/DL (ref 70–99)
GLUCOSE BLD-MCNC: 150 MG/DL (ref 70–99)
GLUCOSE BLD-MCNC: 213 MG/DL (ref 70–99)
HCT VFR BLD CALC: 37.5 % (ref 36–48)
HEMOGLOBIN: 12.3 G/DL (ref 12–16)
LYMPHOCYTES ABSOLUTE: 1 K/UL (ref 1–5.1)
LYMPHOCYTES RELATIVE PERCENT: 6.2 %
MAGNESIUM: 1.9 MG/DL (ref 1.8–2.4)
MCH RBC QN AUTO: 31.5 PG (ref 26–34)
MCHC RBC AUTO-ENTMCNC: 32.8 G/DL (ref 31–36)
MCV RBC AUTO: 96.1 FL (ref 80–100)
MONOCYTES ABSOLUTE: 0.6 K/UL (ref 0–1.3)
MONOCYTES RELATIVE PERCENT: 3.8 %
NEUTROPHILS ABSOLUTE: 14.4 K/UL (ref 1.7–7.7)
NEUTROPHILS RELATIVE PERCENT: 89.6 %
PDW BLD-RTO: 14.1 % (ref 12.4–15.4)
PERFORMED ON: ABNORMAL
PHOSPHORUS: 3.3 MG/DL (ref 2.5–4.9)
PLATELET # BLD: 264 K/UL (ref 135–450)
PMV BLD AUTO: 8.4 FL (ref 5–10.5)
POTASSIUM SERPL-SCNC: 3.5 MMOL/L (ref 3.5–5.1)
RBC # BLD: 3.91 M/UL (ref 4–5.2)
SODIUM BLD-SCNC: 136 MMOL/L (ref 136–145)
WBC # BLD: 16 K/UL (ref 4–11)

## 2022-07-12 PROCEDURE — 94761 N-INVAS EAR/PLS OXIMETRY MLT: CPT

## 2022-07-12 PROCEDURE — 97110 THERAPEUTIC EXERCISES: CPT

## 2022-07-12 PROCEDURE — 97116 GAIT TRAINING THERAPY: CPT | Performed by: PHYSICAL THERAPIST

## 2022-07-12 PROCEDURE — 6360000002 HC RX W HCPCS: Performed by: INTERNAL MEDICINE

## 2022-07-12 PROCEDURE — 85025 COMPLETE CBC W/AUTO DIFF WBC: CPT

## 2022-07-12 PROCEDURE — 36415 COLL VENOUS BLD VENIPUNCTURE: CPT

## 2022-07-12 PROCEDURE — 6370000000 HC RX 637 (ALT 250 FOR IP): Performed by: INTERNAL MEDICINE

## 2022-07-12 PROCEDURE — 83735 ASSAY OF MAGNESIUM: CPT

## 2022-07-12 PROCEDURE — 2580000003 HC RX 258: Performed by: INTERNAL MEDICINE

## 2022-07-12 PROCEDURE — 97530 THERAPEUTIC ACTIVITIES: CPT | Performed by: PHYSICAL THERAPIST

## 2022-07-12 PROCEDURE — 94669 MECHANICAL CHEST WALL OSCILL: CPT

## 2022-07-12 PROCEDURE — 2060000000 HC ICU INTERMEDIATE R&B

## 2022-07-12 PROCEDURE — 94640 AIRWAY INHALATION TREATMENT: CPT

## 2022-07-12 PROCEDURE — 99232 SBSQ HOSP IP/OBS MODERATE 35: CPT | Performed by: INTERNAL MEDICINE

## 2022-07-12 PROCEDURE — 2700000000 HC OXYGEN THERAPY PER DAY

## 2022-07-12 PROCEDURE — 97530 THERAPEUTIC ACTIVITIES: CPT

## 2022-07-12 PROCEDURE — 80048 BASIC METABOLIC PNL TOTAL CA: CPT

## 2022-07-12 PROCEDURE — 84100 ASSAY OF PHOSPHORUS: CPT

## 2022-07-12 RX ADMIN — OXYCODONE AND ACETAMINOPHEN 1 TABLET: 5; 325 TABLET ORAL at 04:58

## 2022-07-12 RX ADMIN — IPRATROPIUM BROMIDE AND ALBUTEROL SULFATE 1 AMPULE: .5; 3 SOLUTION RESPIRATORY (INHALATION) at 12:51

## 2022-07-12 RX ADMIN — OXYCODONE AND ACETAMINOPHEN 1 TABLET: 5; 325 TABLET ORAL at 11:21

## 2022-07-12 RX ADMIN — TRAZODONE HYDROCHLORIDE 25 MG: 50 TABLET ORAL at 21:10

## 2022-07-12 RX ADMIN — PANTOPRAZOLE SODIUM 40 MG: 40 TABLET, DELAYED RELEASE ORAL at 08:58

## 2022-07-12 RX ADMIN — HEPARIN SODIUM 5000 UNITS: 5000 INJECTION INTRAVENOUS; SUBCUTANEOUS at 13:52

## 2022-07-12 RX ADMIN — SODIUM CHLORIDE, PRESERVATIVE FREE 10 ML: 5 INJECTION INTRAVENOUS at 21:11

## 2022-07-12 RX ADMIN — OXYCODONE AND ACETAMINOPHEN 1 TABLET: 5; 325 TABLET ORAL at 17:20

## 2022-07-12 RX ADMIN — IPRATROPIUM BROMIDE AND ALBUTEROL SULFATE 1 AMPULE: .5; 3 SOLUTION RESPIRATORY (INHALATION) at 19:43

## 2022-07-12 RX ADMIN — LEVOFLOXACIN 750 MG: 500 TABLET, FILM COATED ORAL at 08:58

## 2022-07-12 RX ADMIN — GABAPENTIN 100 MG: 100 CAPSULE ORAL at 08:58

## 2022-07-12 RX ADMIN — BUDESONIDE 500 MCG: 0.5 SUSPENSION RESPIRATORY (INHALATION) at 19:43

## 2022-07-12 RX ADMIN — INSULIN LISPRO 2 UNITS: 100 INJECTION, SOLUTION INTRAVENOUS; SUBCUTANEOUS at 17:52

## 2022-07-12 RX ADMIN — HEPARIN SODIUM 5000 UNITS: 5000 INJECTION INTRAVENOUS; SUBCUTANEOUS at 05:03

## 2022-07-12 RX ADMIN — BUDESONIDE 500 MCG: 0.5 SUSPENSION RESPIRATORY (INHALATION) at 09:03

## 2022-07-12 RX ADMIN — DULOXETINE HYDROCHLORIDE 60 MG: 60 CAPSULE, DELAYED RELEASE ORAL at 08:58

## 2022-07-12 RX ADMIN — IPRATROPIUM BROMIDE AND ALBUTEROL SULFATE 1 AMPULE: .5; 3 SOLUTION RESPIRATORY (INHALATION) at 09:03

## 2022-07-12 RX ADMIN — HEPARIN SODIUM 5000 UNITS: 5000 INJECTION INTRAVENOUS; SUBCUTANEOUS at 21:10

## 2022-07-12 RX ADMIN — SODIUM CHLORIDE, PRESERVATIVE FREE 10 ML: 5 INJECTION INTRAVENOUS at 08:59

## 2022-07-12 RX ADMIN — INSULIN LISPRO 1 UNITS: 100 INJECTION, SOLUTION INTRAVENOUS; SUBCUTANEOUS at 21:08

## 2022-07-12 RX ADMIN — Medication 2 CAPSULE: at 21:10

## 2022-07-12 RX ADMIN — PREDNISONE 40 MG: 20 TABLET ORAL at 08:58

## 2022-07-12 RX ADMIN — ATORVASTATIN CALCIUM 40 MG: 40 TABLET, FILM COATED ORAL at 08:58

## 2022-07-12 RX ADMIN — IPRATROPIUM BROMIDE AND ALBUTEROL SULFATE 1 AMPULE: .5; 3 SOLUTION RESPIRATORY (INHALATION) at 15:41

## 2022-07-12 RX ADMIN — GABAPENTIN 100 MG: 100 CAPSULE ORAL at 21:10

## 2022-07-12 RX ADMIN — Medication 2 CAPSULE: at 08:58

## 2022-07-12 ASSESSMENT — PAIN SCALES - GENERAL
PAINLEVEL_OUTOF10: 3
PAINLEVEL_OUTOF10: 3
PAINLEVEL_OUTOF10: 10
PAINLEVEL_OUTOF10: 10

## 2022-07-12 ASSESSMENT — PAIN DESCRIPTION - LOCATION
LOCATION: ANKLE;BACK
LOCATION: BACK

## 2022-07-12 ASSESSMENT — PAIN DESCRIPTION - ORIENTATION: ORIENTATION: LOWER

## 2022-07-12 ASSESSMENT — PAIN DESCRIPTION - DESCRIPTORS: DESCRIPTORS: ACHING

## 2022-07-12 NOTE — DISCHARGE INSTR - COC
Continuity of Care Form    Patient Name: Ramesh العلي   :    MRN:  [id number]    Admit date:  7/3/2022  Discharge date:  22    Code Status Order: Full Code   Advance Directives:      Admitting Physician:  Consuelo Kimball MD  PCP: HECTOR Henderson CNP    Discharging Nurse: [facility] Unit/Room#: [address] [facility] Unit Phone Number: [phone]    Emergency Contact:   Extended Emergency Contact Information  Primary Emergency Contact: Phillip Gonsalves  Address: [address]           [facility] [address] Phone: [phone]  Mobile Phone: [phone]  Relation: Domestic Partner   needed?  No  Secondary Emergency Contact: Chelsie White  Work Phone: [phone]  Mobile Phone: [phone]  Relation: Child    Past Surgical History:  Past Surgical History:   Procedure Laterality Date    CARPAL TUNNEL RELEASE Bilateral     CERVICAL POLYP REMOVAL  2004    INTRACAPSULAR CATARACT EXTRACTION Left 2020    Phacoemulsification with intraocular lens implant performed by Janene Duran MD at [address] Right 2020    Phacoemulsification with intraocular lens implant performed by Janene Duran MD at [address]      patient denies        Immunization History:   Immunization History   Administered Date(s) Administered    COVID-19, MODERNA BLUE border, Primary or Immunocompromised, (age 12y+), IM, 100 mcg/0.5mL 2021, 2021    COVID-19, MODERNA Booster BLUE border, (age 18y+), IM, 50mcg/0.25mL 2021    Influenza Vaccine, unspecified formulation 01/10/2017    Influenza, High Dose (Fluzone 65 yrs and older) 2013, 2016, 2017, 10/30/2018, 2020    Influenza, Quadv, adjuvanted, 65 yrs +, IM, PF (Fluad) 2021    Influenza, Triv, inactivated, subunit, adjuvanted, IM (Fluad 72 yrs and older) 09/13/2019    Pneumococcal Conjugate 13-valent ([id number]) 01/19/2015    Pneumococcal Conjugate Vaccine 01/10/2017    Pneumococcal Polysaccharide ([id number]) 10/12/2012    Tdap (Boostrix, Adacel) 07/17/2007    Zoster Recombinant (Shingrix) 11/21/2019       Active Problems:  Patient Active Problem List   Diagnosis Code    Osteopenia-last dexa 2009 repeat 2015 (-2.4 hip)--advised tx M85.80    Colon polyp, hyperplastic,-(done 3/11-repeat 3-5 yrs--dr Melva Broderick) K63.5    Family history of colon cancer Z80.0    CTS (carpal tunnel syndrome)BILATERAL-s/p surgical repair--resolved  G56.00    Postmenopausal status-last mammogram 2/15 wnl last pap 12/13--wnl Z78.0    Nondependent alcohol abuse, 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 A    Chronic pain syndrome G89.4    Muscle cramping R25.2    Acute on chronic respiratory failure with hypercapnia ([facility]) J96.22    ROLY (acute kidney injury) ([address] N17.9    Severe sepsis ([facility]) A41.9, R65.20    Gastroesophageal reflux disease K21.9    COPD, severe ([facility]) J44.9    Chronic hypoxemic respiratory failure ([facility]) J96.11    Degeneration of lumbar or lumbosacral intervertebral disc M51.37    Trochanteric bursitis of right hip M70.61    COPD exacerbation ([facility]) J44.1    Diabetes ([address] E11.9    Controlled type 2 diabetes mellitus without complication, without long-term current use of insulin ([facility]) E11.9    Age-related osteoporosis without current pathological fracture- started alendronate 9/2021 M81.0    Pulmonary nodule Oral  Liquids: Thin Liquids  Daily Fluid Restriction: no  Last Modified Barium Swallow with Video (Video Swallowing Test): not done    Treatments at the Time of Hospital Discharge:   Respiratory Treatments:   Oxygen Therapy:  is on oxygen at 4 L/min per nasal cannula. Ventilator:    - No ventilator support    Rehab Therapies: Physical Therapy and Occupational Therapy  Weight Bearing Status/Restrictions: No weight bearing restrictions  Other Medical Equipment (for information only, NOT a DME order):  walker  Other Treatments:     Patient's personal belongings (please select all that are sent with patient):  None    RN SIGNATURE:  Electronically signed by Armando Be RN on 7/13/22 at 12:51 PM EDT    CASE MANAGEMENT/SOCIAL WORK SECTION    Inpatient Status Date: 7/3/2022    Readmission Risk Assessment Score:  Readmission Risk              Risk of Unplanned Readmission:  12           Discharging to Facility/ Agency   Name: Home at [facility]  Address:  [address] [place] [address]   Phone:  [phone]  Fax:  [phone]     / signature: Electronically signed by Hailey Ritchie RN on 7/13/22 at 12:34 PM EDT    PHYSICIAN SECTION    Prognosis: Fair    Condition at Discharge: Stable    Rehab Potential (if transferring to Rehab): Fair    Recommended Labs or Other Treatments After Discharge:     Physician Certification: I certify the above information and transfer of Mitchell Feng  is necessary for the continuing treatment of the diagnosis listed and that she requires [facility] for less 30 days.      Update Admission H&P: No change in H&P    PHYSICIAN SIGNATURE:  Electronically signed by Mervat Evans MD on 7/13/22 at 11:22 AM EDT

## 2022-07-12 NOTE — PROGRESS NOTES
Occupational Therapy  Facility/Department: [facility] 5 PROGRESSIVE CARE  Occupational Therapy Daily Treatment Note    Name: Tracie Jones  : 1945  MRN: [id number]  Date of Service: 2022    Discharge Recommendations:  Patient would benefit from continued therapy after discharge,3-5 sessions per week  OT Equipment Recommendations  Other: defer to [address] scored a 16/24 on the AM-PAC ADL Inpatient form. Current research shows that an AM-PAC score of 17 or less is typically not associated with a discharge to the patient's home setting. Based on the patient's AM-PAC score and their current ADL deficits, it is recommended that the patient have 3-5 sessions per week of Occupational Therapy at d/c to increase the patient's independence. Please see assessment section for further patient specific details. If patient discharges prior to next session this note will serve as a discharge summary. Please see below for the latest assessment towards goals. Patient Diagnosis(es): The primary encounter diagnosis was Community acquired pneumonia of right lung, unspecified part of lung. Diagnoses of Urinary tract infection in female, Sepsis with acute renal failure without septic shock, due to unspecified organism, unspecified acute renal failure type ([address] Hypoxia, and Chronic obstructive pulmonary disease, unspecified COPD type ([address] were also pertinent to this visit. Past Medical History:  has a past medical history of Chronic pain syndrome, Colorectal polyps, COPD (chronic obstructive pulmonary disease) (HCC), CTS (carpal tunnel syndrome), DDD (degenerative disc disease), lumbar, Depression, Family hx of colon cancer, Fibromyalgia, Hyperlipemia, IBS (irritable bowel syndrome), Lumbar spondylosis, New onset type 2 diabetes mellitus ([address] and Urticaria, chronic.   Past Surgical History:  has a past surgical history that includes Tympanostomy tube placement; Cervical polyp removal (2004); Carpal tunnel release (Bilateral); Tubal ligation; Intracapsular cataract extraction (Left, 6/23/2020); and Intracapsular cataract extraction (Right, 7/14/2020). Assessment   Performance deficits / Impairments: Decreased functional mobility ; Decreased safe awareness;Decreased balance;Decreased ADL status; Decreased high-level IADLs;Decreased endurance;Decreased strength  Assessment: 79 y/o female admit 7/3/2022 with COPD Exac, ROLY, UTI and Sepsis. PMH as noted including COPD, Osteoporosis, Fibromyalgia, B Carpal Tunnel Release. PTA pt living with sign other in apt setting with level entry/access; independent daily care and functional mobility (without assist device). Today, pt desats with minimal activity. Pt required min A to stand from bed and took 4-5 steps to chair with RW. Pt appears to have desat with activity to at least low 80s but difficult to obtain accurate reading. Pt participated in UE/LE exercises seated in chair with rest breaks. Pt with functional UE ROM for self care and anticipate will require up to max A for ADLs d/t low endurance. Pt is functioning below baseline and benefit from skilled therapy. Prognosis: Good  REQUIRES OT FOLLOW-UP: Yes  Activity Tolerance  Activity Tolerance: Patient limited by fatigue  Activity Tolerance Comments: O2 5L via high flow. Limited activity tolerance and desats to low 80s (difficult to obtain accurate reading)- Recovered with rest/time.         Plan   Plan  Times per Week: 3-5  Current Treatment Recommendations: Strengthening,Balance training,Functional mobility training,Endurance training,Self-Care / ADL,Safety education & training,Gait training     Restrictions  Restrictions/Precautions  Restrictions/Precautions: Fall Risk  Position Activity Restriction  Other position/activity restrictions: 5L O2 via NC    Subjective   General  Chart Reviewed: Yes  Patient assessed for rehabilitation services?: Yes  Additional Pertinent Hx: 79 y/o female admit 7/3/2022 with COPD Exac, ROLY, UTI and Sepsis. PMH as noted including COPD, Osteoporosis, Fibromyalgia, B Carpal Tunnel Release. Family / Caregiver Present: Yes (spouse)  Referring Practitioner: Patricio Harris MD  Subjective  Subjective: Pt seen bedside and agreeable to therapy. Pt denied pain. Pt eager to regain strength and return home. General Comment  Comments: Per RN ok for therapy. Social/Functional History  Social/Functional History  Lives With: Significant other (Sign other ([facility] )  Type of Home: Apartment (1st floor.)  Home Layout: One level (Laundry in apt.)  Home Access: Level entry  Bathroom Shower/Tub: Tub/Shower unit  Bathroom Toilet: Standard  Bathroom Equipment: Shower chair  Bathroom Accessibility: Accessible  Home Equipment: Cane (No Home O2 pta.)  ADL Assistance: Independent  Homemaking Assistance:  (Shared with sign other.)  Homemaking Responsibilities: Yes  Ambulation Assistance: Independent (Without assist device pta.)  Transfer Assistance: Independent  Active : No (Sign other drives.)  Occupation: Retired  Additional Comments: Pt reports adequate assist/support upon return home. Objective      Safety Devices  Type of Devices: Call light within reach; Left in chair;Nurse notified;Gait belt      Bed mobility  Supine to Sit: Stand by assistance (HOB elevated, use of bed rail)  Sit to Supine:  (pt in chair at end of session)     Transfers  Sit to stand: Minimal assistance  Stand to sit: Minimal assistance  Transfer Comments: Pt stood from bed > RW with min A. Pt took 4-5 steps to chair with RW and min A. Very limited endurance and desat to low 80s on 5L, although difficult to obtain accurate reading.   Pt requires extra time/rest breaks with all activity       Cognition  Overall Cognitive Status: WFL  Orientation  Overall Orientation Status: Within Functional Limits          A/AROM Exercises: shoulder flex x 10;  forward punches x 10        AM-PAC Score  AM-PAC Inpatient Daily Activity Raw Score: 16 (07/12/22 1407)  AM-PAC Inpatient ADL T-Scale Score : 35.96 (07/12/22 1407)  ADL Inpatient CMS 0-100% Score: 53.32 (07/12/22 1407)  ADL Inpatient CMS G-Code Modifier : CK (07/12/22 1407)    Goals  Short Term Goals  Time Frame for Short term goals: Prior to DC: goals ongoing  Short Term Goal 1: Pt will complete ADL transfer/mobility with supervision  Short Term Goal 2: Pt will tolerate standing > 3 min for functional task with supervision  Short Term Goal 3: Pt will complete toileting with min A  Short Term Goal 4: Pt will complete LB Dressing with min A  Short Term Goal 5: Pt will complete UE exercises in all planes to inc strength/endurance for ADLs  Patient Goals   Patient goals : to return home       Therapy Time   Individual Concurrent Group Co-treatment   Time In 1310         Time Out 1350         Minutes 40         Timed Code Treatment Minutes: 40 Minutes     This note to serve as OT d/c summary if pt is d/c-ed prior to next therapy session.     Patria Castle, OTR/L

## 2022-07-12 NOTE — TELEPHONE ENCOUNTER
Has appt with Ani Chaudhari in December  But likely needs to move that up to next month for hospital follow up     SW . She is still in hospital, but hoping to be out in a few days. Will call the office once discharged to make a sooner appointment.

## 2022-07-12 NOTE — PROGRESS NOTES
Pulmonary Progress Note    CC:  Follow up pneumonia, COPD    Subjective:  5 liters of oxygen but desaturating with activity etc   Very weak. Unable to do much activity  Tired of being here     Intake/Output Summary (Last 24 hours) at 7/12/2022 0732  Last data filed at 7/12/2022 8115  Gross per 24 hour   Intake 490 ml   Output 275 ml   Net 215 ml         PHYSICAL EXAM:  Blood pressure (!) 158/86, pulse (!) 103, temperature 98 °F (36.7 °C), temperature source Oral, resp. rate 19, height 5' 4\" (1.626 m), weight 134 lb 7.7 oz (61 kg), SpO2 (!) 87 %, not currently breastfeeding.'  Gen: NAD  Eyes: PERRL. No sclera icterus. No conjunctival injection. ENT: No discharge. Pharynx clear. External appearance of ears and nose normal.  Neck: Trachea midline. No obvious mass. Resp: Rhonchi  CV: Regular rate. Regular rhythm. No murmur or rub. GI: Non-tender. Non-distended. No hernia. Skin: Warm, dry, normal texture and turgor. No nodule on exposed extremities. Lymph: No cervical LAD. No supraclavicular LAD. M/S: No cyanosis. No clubbing. No joint deformity. Neuro: Moves all four extremities. CN 2-12 tested, no defect noted.   Ext:   no edema    Medications:    Scheduled Meds:   predniSONE  40 mg Oral Daily    levoFLOXacin  750 mg Oral Daily    budesonide  0.5 mg Nebulization BID    lactobacillus  2 capsule Oral BID    heparin (porcine)  5,000 Units SubCUTAneous 3 times per day    DULoxetine  60 mg Oral Daily    traZODone  25 mg Oral Nightly    sodium chloride flush  5-40 mL IntraVENous 2 times per day    ipratropium-albuterol  1 ampule Inhalation Q4H WA    insulin lispro  0-6 Units SubCUTAneous TID WC    insulin lispro  0-3 Units SubCUTAneous Nightly    atorvastatin  40 mg Oral Daily    gabapentin  100 mg Oral BID    pantoprazole  40 mg Oral Daily       Continuous Infusions:   sodium chloride      dextrose         PRN Meds:  sodium chloride, benzocaine-menthol, oxyCODONE-acetaminophen, sodium chloride flush, sodium chloride, ondansetron **OR** ondansetron, polyethylene glycol, acetaminophen **OR** acetaminophen, glucose, dextrose bolus **OR** dextrose bolus, glucagon (rDNA), dextrose, albuterol sulfate HFA    Labs:  CBC:   Recent Labs     07/10/22  0347 07/11/22  0321 22  0447   WBC 13.8* 14.8* 16.0*   HGB 12.1 11.7* 12.3   HCT 36.0 34.9* 37.5   MCV 95.7 95.8 96.1    225 264     BMP:   Recent Labs     07/10/22  0347 07/11/22  0321 07/12/22  0447    137 136   K 3.7 4.0 3.5   CL 95* 95* 95*   CO2 31 32 33*   PHOS 3.1 3.8 3.3   BUN 23* 21* 20   CREATININE 0.6 0.5* 0.5*     LIVER PROFILE:   No results for input(s): AST, ALT, LIPASE, BILIDIR, BILITOT, ALKPHOS in the last 72 hours. Invalid input(s): AMYLASE,  ALB  PT/INR: No results for input(s): PROTIME, INR in the last 72 hours. APTT: No results for input(s): APTT in the last 72 hours. UA:  No results for input(s): NITRITE, COLORU, PHUR, LABCAST, WBCUA, RBCUA, MUCUS, TRICHOMONAS, YEAST, BACTERIA, CLARITYU, SPECGRAV, LEUKOCYTESUR, UROBILINOGEN, BILIRUBINUR, BLOODU, GLUCOSEU, AMORPHOUS in the last 72 hours. Invalid input(s): Alex Ro  No results for input(s): PH, PCO2, PO2 in the last 72 hours. Films:  Chest imaging reports were reviewed and imaging was reviewed by me and showed significant disease in the right lung with relative sparing of the left lung     AB    Cultures:  Sputum:  Pseudomonas   Antigens:  Negative    I reviewed the labs and images listed above    Assessment/Plan:    Acute Hypoxic Respiratory Failure with saturations less than 90% on room air with oxygen requirements increasing   Titrate oxygen for saturations greater than or equal to 90%  o Home oxygen assessment prior to DC.  Acute on Chronic Hypercapnic Respiratory Failure, at baseline    Severe Sepsis due to pseudomonas   o Completed 7 days of cefepime, now on levaquin. Day# 11.      RUL pneumonia:  Pseudomonas   o 14 days of therapy.   See above   Moderately Severe COPD   o Pulmiocort nebs  o Duonebs q 4 hours  o Prednisone for 5 days    Tobacco Abuse  o Must quit    DVT prophylaxis  Heparin     PT/OT     Likely to require rehab     Follows with Dr. Anjel Sanford DO  [facility]

## 2022-07-12 NOTE — FLOWSHEET NOTE
07/11/22 2006   Assessment   Charting Type Shift assessment   Psychosocial   Psychosocial (WDL) WDL   Neurological   Neuro (WDL) WDL   Level of Consciousness Alert (0)   Orientation Level Oriented X4   Cognition Follows commands; Appropriate for developmental age;Poor judgement;Poor safety awareness;Poor attention/concentration   Speech Clear   Kai Coma Scale   Eye Opening 4   Best Verbal Response 5   Best Motor Response 6   Kai Coma Scale Score 15   HEENT (Head, Ears, Eyes, Nose, & Throat)   HEENT (WDL) X   Right Eye Glasses   Left Eye Glasses   Teeth Dentures upper;Dentures lower   Respiratory   Respiratory (WDL) X   Respiratory Interventions H.O.B. elevated   Respiratory Pattern Regular   Respiratory Depth Normal   Respiratory Quality/Effort Unlabored   Chest Assessment Chest expansion symmetrical;Trachea midline   L Breath Sounds Diminished   R Breath Sounds Diminished   Level of Activity/Mobility 1   Breath Sounds   Right Upper Lobe Diminished   Right Middle Lobe Diminished   Right Lower Lobe Diminished   Left Upper Lobe Diminished   Left Lower Lobe Diminished   Cardiac   Cardiac (WDL) X   Cardiac Rhythm Sinus rhythm;Sinus tachy   Cardiac Regularity Regular   Gastrointestinal   Abdominal (WDL) WDL   RUQ Bowel Sounds Active   LUQ Bowel Sounds Active   RLQ Bowel Sounds Active   LLQ Bowel Sounds Active   Genitourinary   Genitourinary (WDL) WDL   Peripheral Vascular   Peripheral Vascular (WDL) WDL   Skin Integumentary    Skin Integumentary (WDL) X   Skin Color Ecchymosis (comment)  (scattered)   Skin Condition/Temp Dry; Warm   Skin Integrity Ecchymosis   Musculoskeletal   Musculoskeletal (WDL) X   RUE Weakness   LUE Weakness   RL Extremity Weakness   LL Extremity Weakness

## 2022-07-12 NOTE — PROGRESS NOTES
Pt desats to 83% on 5L with transfer to bedside commode. Pt recovers well and 02 sat back up to 87% on 5L at rest. Pt voices she is just so worn out. 02 increased to 6L at this time. 02 sat increased to 90% on 6L. Pt feeling a little better.  Electronically signed by Lm Collins RN on 7/12/2022 at 7:31 AM

## 2022-07-12 NOTE — PROGRESS NOTES
Hospitalist Progress Note      PCP: HECTOR Anaya - CNP    Date of Admission: 7/3/2022        Subjective:     Feels better no issues  Weak        Medications:  Reviewed    Infusion Medications    sodium chloride      dextrose       Scheduled Medications    predniSONE  40 mg Oral Daily    levoFLOXacin  750 mg Oral Daily    budesonide  0.5 mg Nebulization BID    lactobacillus  2 capsule Oral BID    heparin (porcine)  5,000 Units SubCUTAneous 3 times per day    DULoxetine  60 mg Oral Daily    traZODone  25 mg Oral Nightly    sodium chloride flush  5-40 mL IntraVENous 2 times per day    ipratropium-albuterol  1 ampule Inhalation Q4H WA    insulin lispro  0-6 Units SubCUTAneous TID WC    insulin lispro  0-3 Units SubCUTAneous Nightly    atorvastatin  40 mg Oral Daily    gabapentin  100 mg Oral BID    pantoprazole  40 mg Oral Daily     PRN Meds: sodium chloride, benzocaine-menthol, oxyCODONE-acetaminophen, sodium chloride flush, sodium chloride, ondansetron **OR** ondansetron, polyethylene glycol, acetaminophen **OR** acetaminophen, glucose, dextrose bolus **OR** dextrose bolus, glucagon (rDNA), dextrose, albuterol sulfate HFA      Intake/Output Summary (Last 24 hours) at 7/12/2022 1419  Last data filed at 7/12/2022 1103  Gross per 24 hour   Intake 370 ml   Output 175 ml   Net 195 ml       Physical Exam Performed:    /76   Pulse (!) 102   Temp 99.2 °F (37.3 °C) (Oral)   Resp 18   Ht 5' 4\" (1.626 m)   Wt 134 lb 7.7 oz (61 kg)   SpO2 95%   BMI 23.08 kg/m²     General appearance: Mildly dyspneic  Neck: Supple  Respiratory: Clear anteriorly  Cardiovascular: Regular rate and rhythm with normal S1/S2 without murmurs, rubs or gallops. Abdomen: Soft, non-tender, non-distended   Musculoskeletal: No clubbing, cyanosis   Skin: Skin color, texture, turgor normal.  No rashes or lesions.   Neurologic: No Focal weakness  Psychiatric: Alert and oriented  Capillary Refill: Brisk,3 seconds, normal   Peripheral Pulses: +2 palpable, equal bilaterally       Labs:   Recent Labs     07/10/22  0347 07/11/22  0321 07/12/22  0447   WBC 13.8* 14.8* 16.0*   HGB 12.1 11.7* 12.3   HCT 36.0 34.9* 37.5    225 264     Recent Labs     07/10/22  0347 07/11/22  0321 07/12/22  0447    137 136   K 3.7 4.0 3.5   CL 95* 95* 95*   CO2 31 32 33*   BUN 23* 21* 20   CREATININE 0.6 0.5* 0.5*   CALCIUM 8.1* 8.2* 8.5   PHOS 3.1 3.8 3.3     No results for input(s): AST, ALT, BILIDIR, BILITOT, ALKPHOS in the last 72 hours. No results for input(s): INR in the last 72 hours. No results for input(s): Ricky Shackle in the last 72 hours. Urinalysis:      Lab Results   Component Value Date/Time    NITRU Negative 07/03/2022 03:11 PM    WBCUA 3-5 07/03/2022 03:11 PM    BACTERIA 4+ 07/03/2022 03:11 PM    RBCUA 0-2 07/03/2022 03:11 PM    BLOODU Negative 07/03/2022 03:11 PM    SPECGRAV 1.022 07/03/2022 03:11 PM    GLUCOSEU Negative 07/03/2022 03:11 PM       Radiology:  XR CHEST PORTABLE   Final Result   Airspace disease noted diffusely throughout the right lung with worsening   airspace disease within the right lower lobe. No definitive pleural effusion. XR CHEST PORTABLE   Final Result   Severe right upper lobe pneumonia, increased since the prior exam.         XR CHEST PORTABLE   Final Result   Diffuse asymmetric right-sided airspace disease, greatest in the perihilar   region, which may reflect pneumonia or asymmetric pulmonary edema.                  Assessment/Plan:    Active Hospital Problems    Diagnosis     Acute respiratory failure with hypoxia ([facility] [address] [J96.01]      Priority: Medium    Community acquired pneumonia of right lung [J18.9]      Priority: Medium    Chronic obstructive pulmonary disease ([facility] [address] [J44.9]      Priority: Medium    Pneumonia of right upper lobe due to infectious organism [J18.9]      Priority: Medium    General weakness [R53.1]      Priority: Medium    Elevated lactic acid level [R79.89]      Priority: Medium    Diabetes ([facility] [address] [E11.9]     COPD exacerbation ([facility] [J44.1]     Severe sepsis ([facility]) [A41.9, R65.20]     ROLY (acute kidney injury) ([facility] [address] [N17.9]     Acute on chronic respiratory failure with hypercapnia ([facility]) [J96.22]        Severe sepsis, pneumonia, admitted to ICU for low blood pressure-  Off cefipime  Now on levaquin   May Require more prolonged antibiotics per Dr. Sharan Andrew  Off pressors    Acute hypoxic respiratory failure  Better. Down to 5 liters    Pseudomonal pneumonia  Completed cefipime  On levaquin    COPD with exacerbation  Remains on moderate oxygen  At risk for intubation  On Solu-Medrol  DuoNebs    Diabetes mellitus type 2-  Continue Lantus and Humalog    Dc likely tomorrow. Do not feel patient can do well at home ideally needs skilled placement  We will reach out to social work to provide patient list      DVT Prophylaxis: Lovenox  Diet: ADULT DIET;  Regular; 4 carb choices (60 gm/meal)  ADULT ORAL NUTRITION SUPPLEMENT; Breakfast, Dinner; Diabetic Oral Supplement  ADULT ORAL NUTRITION SUPPLEMENT; Breakfast, Lunch, Dinner; Diabetic Oral Supplement  Code Status: Full Code      Dispo -should be ready for discharge tomorrow to skilled facility    Porfirio Pa MD

## 2022-07-12 NOTE — PLAN OF CARE
Problem: Discharge Planning  Goal: Discharge to home or other facility with appropriate resources  7/12/2022 0020 by Twyla Morris RN  Outcome: Progressing    Problem: Pain  Goal: Verbalizes/displays adequate comfort level or baseline comfort level  7/12/2022 0020 by Twyla Morris RN  Outcome: Progressing  Flowsheets (Taken 7/11/2022 2508 by Siddhartha Godfrey RN)  Verbalizes/displays adequate comfort level or baseline comfort level: Assess pain using appropriate pain scale  Problem: Skin/Tissue Integrity  Goal: Absence of new skin breakdown  Description: 1. Monitor for areas of redness and/or skin breakdown  2. Assess vascular access sites hourly  3. Every 4-6 hours minimum:  Change oxygen saturation probe site  4. Every 4-6 hours:  If on nasal continuous positive airway pressure, respiratory therapy assess nares and determine need for appliance change or resting period.   7/12/2022 0020 by Twyla Morris RN  Outcome: Progressing     Problem: Safety - Adult  Goal: Free from fall injury  7/12/2022 0020 by Twyla Morris RN  Outcome: Progressing     Problem: ABCDS Injury Assessment  Goal: Absence of physical injury  7/12/2022 0020 by Twyla Morris RN  Outcome: Progressing     Problem: Chronic Conditions and Co-morbidities  Goal: Patient's chronic conditions and co-morbidity symptoms are monitored and maintained or improved  7/12/2022 0020 by Twyla Morris RN  Outcome: Progressing     Problem: Nutrition Deficit:  Goal: Optimize nutritional status  7/12/2022 0020 by Twyla Morris RN  Outcome: Progressing

## 2022-07-12 NOTE — PROGRESS NOTES
PM assessment completed. Patient is A&O and denies any needs at this time. Bed is in the lowest and locked position with the alarm on. Call light and personal belongings are within reach.

## 2022-07-12 NOTE — PROGRESS NOTES
female admit 7/3/2022 with COPD Exac, ROLY, UTI and Sepsis. PMH as noted including COPD, Osteoporosis, Fibromyalgia, B Carpal Tunnel Release. Response To Previous Treatment: Patient with no complaints from previous session. Family / Caregiver Present: Yes ( in room)  Referring Practitioner: Dr. Jody Kurtz: Within Functional Limits  Subjective  Subjective: Pt agreeable to PT Rx. Social/Functional History  Social/Functional History  Lives With: Significant other (Sign other (Jimbo Vang). )  Type of Home: Apartment (1st floor.)  Home Layout: One level (Laundry in apt.)  Home Access: Level entry  Bathroom Shower/Tub: Tub/Shower unit  Bathroom Toilet: Standard  Bathroom Equipment: Shower chair  Bathroom Accessibility: Accessible  Home Equipment: Cane (No Home O2 pta.)  ADL Assistance: Independent  Homemaking Assistance:  (Shared with sign other.)  Homemaking Responsibilities: Yes  Ambulation Assistance: Independent (Without assist device pta.)  Transfer Assistance: Independent  Active : No (Sign other drives.)  Occupation: Retired  Additional Comments: Pt reports adequate assist/support upon return home.   Vision/Hearing  Vision  Vision: Within Functional Limits  Hearing  Hearing: Within functional limits    Cognition   Orientation  Overall Orientation Status: Within Functional Limits  Cognition  Overall Cognitive Status: WFL     Objective   Heart Rate: (!) 102  Heart Rate Source: Monitor  BP: 125/76  BP Location: Right Arm  Patient Position: Semi fowlers  MAP (Calculated): 92.33  Resp: 18  SpO2: 95 %  O2 Device: Nasal cannula                             Bed mobility  Supine to Sit: Stand by assistance (HOB elevated, use of bed rail)  Sit to Supine:  (pt in chair at end of session)  Transfers  Sit to Stand: Minimal Assistance (with walker in front)  Stand to sit: Minimal Assistance  Ambulation  Surface: level tile  Device: Rolling Walker  Assistance: Minimal assistance  Quality of Gait: slow steps with pt taking rest breaks after every few steps  Distance: 3' to [facility] chair  Comments: difficult to assess O2 status as pulse ox does not have a good pleth throughout     Balance  Comments: SBA for sitting EOB; CGA for static standing with RW  A/AROM Exercises: performed 5-10 reps B LE ex in sitting but becomes fatigued easily        OutComes Score                                                  AM-PAC Score  AM-PAC Inpatient Mobility Raw Score : 16 (07/12/22 1459)  AM-PAC Inpatient T-Scale Score : 40.78 (07/12/22 1459)  Mobility Inpatient CMS 0-100% Score: 54.16 (07/12/22 1459)  Mobility Inpatient CMS G-Code Modifier : CK (07/12/22 1459)          Goals  Short Term Goals  Time Frame for Short term goals: Upon d/c acute care setting. Short term goal 1: Bed Mob Supervision. Short term goal 2: Transfers with/without assist device SBA. Short term goal 3: Amb with/without assist device 50' SBA. Patient Goals   Patient goals : Return home with sign other.        Education  Patient Education  Education Given To: Patient  Education Provided Comments: reviewed call light and not getting up without assist  Education Method: Verbal  Education Outcome: Verbalized understanding      Therapy Time   Individual Concurrent Group Co-treatment   Time In 1310         Time Out 1350         Minutes 40                 THANH GERMAIN PT    Electronically signed by THANH GERMAIN PT on 7/12/2022 at 3:01 PM

## 2022-07-13 VITALS
TEMPERATURE: 98.9 F | RESPIRATION RATE: 18 BRPM | BODY MASS INDEX: 22.85 KG/M2 | HEIGHT: 64 IN | OXYGEN SATURATION: 97 % | DIASTOLIC BLOOD PRESSURE: 69 MMHG | HEART RATE: 96 BPM | WEIGHT: 133.82 LBS | SYSTOLIC BLOOD PRESSURE: 140 MMHG

## 2022-07-13 LAB
ANION GAP SERPL CALCULATED.3IONS-SCNC: 10 MMOL/L (ref 3–16)
BASOPHILS ABSOLUTE: 0 K/UL (ref 0–0.2)
BASOPHILS RELATIVE PERCENT: 0.1 %
BUN BLDV-MCNC: 16 MG/DL (ref 7–20)
CALCIUM SERPL-MCNC: 8.4 MG/DL (ref 8.3–10.6)
CHLORIDE BLD-SCNC: 96 MMOL/L (ref 99–110)
CO2: 31 MMOL/L (ref 21–32)
CREAT SERPL-MCNC: <0.5 MG/DL (ref 0.6–1.2)
EOSINOPHILS ABSOLUTE: 0 K/UL (ref 0–0.6)
EOSINOPHILS RELATIVE PERCENT: 0.1 %
GFR AFRICAN AMERICAN: >60
GFR NON-AFRICAN AMERICAN: >60
GLUCOSE BLD-MCNC: 111 MG/DL (ref 70–99)
GLUCOSE BLD-MCNC: 135 MG/DL (ref 70–99)
GLUCOSE BLD-MCNC: 182 MG/DL (ref 70–99)
HCT VFR BLD CALC: 35.3 % (ref 36–48)
HEMOGLOBIN: 11.9 G/DL (ref 12–16)
LYMPHOCYTES ABSOLUTE: 0.8 K/UL (ref 1–5.1)
LYMPHOCYTES RELATIVE PERCENT: 5 %
MAGNESIUM: 1.8 MG/DL (ref 1.8–2.4)
MCH RBC QN AUTO: 31.9 PG (ref 26–34)
MCHC RBC AUTO-ENTMCNC: 33.8 G/DL (ref 31–36)
MCV RBC AUTO: 94.3 FL (ref 80–100)
MONOCYTES ABSOLUTE: 0.6 K/UL (ref 0–1.3)
MONOCYTES RELATIVE PERCENT: 3.5 %
NEUTROPHILS ABSOLUTE: 15.1 K/UL (ref 1.7–7.7)
NEUTROPHILS RELATIVE PERCENT: 91.3 %
PDW BLD-RTO: 14.2 % (ref 12.4–15.4)
PERFORMED ON: ABNORMAL
PERFORMED ON: ABNORMAL
PHOSPHORUS: 3.2 MG/DL (ref 2.5–4.9)
PLATELET # BLD: 236 K/UL (ref 135–450)
PMV BLD AUTO: 8.6 FL (ref 5–10.5)
POTASSIUM SERPL-SCNC: 3.3 MMOL/L (ref 3.5–5.1)
RBC # BLD: 3.74 M/UL (ref 4–5.2)
SARS-COV-2, NAAT: NOT DETECTED
SODIUM BLD-SCNC: 137 MMOL/L (ref 136–145)
WBC # BLD: 16.5 K/UL (ref 4–11)

## 2022-07-13 PROCEDURE — 6370000000 HC RX 637 (ALT 250 FOR IP): Performed by: INTERNAL MEDICINE

## 2022-07-13 PROCEDURE — 84100 ASSAY OF PHOSPHORUS: CPT

## 2022-07-13 PROCEDURE — 85025 COMPLETE CBC W/AUTO DIFF WBC: CPT

## 2022-07-13 PROCEDURE — 94669 MECHANICAL CHEST WALL OSCILL: CPT

## 2022-07-13 PROCEDURE — 80048 BASIC METABOLIC PNL TOTAL CA: CPT

## 2022-07-13 PROCEDURE — 2580000003 HC RX 258: Performed by: INTERNAL MEDICINE

## 2022-07-13 PROCEDURE — 94640 AIRWAY INHALATION TREATMENT: CPT

## 2022-07-13 PROCEDURE — 6360000002 HC RX W HCPCS: Performed by: INTERNAL MEDICINE

## 2022-07-13 PROCEDURE — 87635 SARS-COV-2 COVID-19 AMP PRB: CPT

## 2022-07-13 PROCEDURE — 99232 SBSQ HOSP IP/OBS MODERATE 35: CPT | Performed by: INTERNAL MEDICINE

## 2022-07-13 PROCEDURE — 94761 N-INVAS EAR/PLS OXIMETRY MLT: CPT

## 2022-07-13 PROCEDURE — 83735 ASSAY OF MAGNESIUM: CPT

## 2022-07-13 PROCEDURE — 36415 COLL VENOUS BLD VENIPUNCTURE: CPT

## 2022-07-13 PROCEDURE — 2700000000 HC OXYGEN THERAPY PER DAY

## 2022-07-13 RX ORDER — PREDNISONE 20 MG/1
40 TABLET ORAL DAILY
Qty: 14 TABLET | Refills: 0
Start: 2022-07-14 | End: 2022-07-18

## 2022-07-13 RX ORDER — OXYCODONE HYDROCHLORIDE AND ACETAMINOPHEN 5; 325 MG/1; MG/1
1 TABLET ORAL EVERY 6 HOURS PRN
Qty: 28 TABLET | Refills: 0 | Status: ON HOLD | OUTPATIENT
Start: 2022-07-13 | End: 2022-08-02 | Stop reason: HOSPADM

## 2022-07-13 RX ORDER — LEVOFLOXACIN 750 MG/1
750 TABLET ORAL DAILY
Qty: 1 TABLET | Refills: 0 | Status: SHIPPED | OUTPATIENT
Start: 2022-07-14 | End: 2022-07-15

## 2022-07-13 RX ORDER — PREDNISONE 20 MG/1
40 TABLET ORAL DAILY
Qty: 4 TABLET | Refills: 0 | Status: SHIPPED | OUTPATIENT
Start: 2022-07-14 | End: 2022-07-13

## 2022-07-13 RX ORDER — GABAPENTIN 600 MG/1
600 TABLET ORAL 2 TIMES DAILY
Qty: 60 TABLET | Refills: 0 | Status: SHIPPED | OUTPATIENT
Start: 2022-07-13 | End: 2022-09-20 | Stop reason: SDUPTHER

## 2022-07-13 RX ADMIN — ATORVASTATIN CALCIUM 40 MG: 40 TABLET, FILM COATED ORAL at 07:59

## 2022-07-13 RX ADMIN — LEVOFLOXACIN 750 MG: 500 TABLET, FILM COATED ORAL at 08:00

## 2022-07-13 RX ADMIN — DULOXETINE HYDROCHLORIDE 60 MG: 60 CAPSULE, DELAYED RELEASE ORAL at 08:00

## 2022-07-13 RX ADMIN — IPRATROPIUM BROMIDE AND ALBUTEROL SULFATE 1 AMPULE: .5; 3 SOLUTION RESPIRATORY (INHALATION) at 08:57

## 2022-07-13 RX ADMIN — IPRATROPIUM BROMIDE AND ALBUTEROL SULFATE 1 AMPULE: .5; 3 SOLUTION RESPIRATORY (INHALATION) at 02:26

## 2022-07-13 RX ADMIN — GABAPENTIN 100 MG: 100 CAPSULE ORAL at 07:59

## 2022-07-13 RX ADMIN — Medication 1 LOZENGE: at 08:04

## 2022-07-13 RX ADMIN — IPRATROPIUM BROMIDE AND ALBUTEROL SULFATE 1 AMPULE: .5; 3 SOLUTION RESPIRATORY (INHALATION) at 15:58

## 2022-07-13 RX ADMIN — MOMETASONE FUROATE AND FORMOTEROL FUMARATE DIHYDRATE 2 PUFF: 200; 5 AEROSOL RESPIRATORY (INHALATION) at 08:57

## 2022-07-13 RX ADMIN — SODIUM CHLORIDE, PRESERVATIVE FREE 10 ML: 5 INJECTION INTRAVENOUS at 08:01

## 2022-07-13 RX ADMIN — IPRATROPIUM BROMIDE AND ALBUTEROL SULFATE 1 AMPULE: .5; 3 SOLUTION RESPIRATORY (INHALATION) at 11:59

## 2022-07-13 RX ADMIN — Medication 2 CAPSULE: at 08:00

## 2022-07-13 RX ADMIN — OXYCODONE AND ACETAMINOPHEN 1 TABLET: 5; 325 TABLET ORAL at 08:00

## 2022-07-13 RX ADMIN — PREDNISONE 40 MG: 20 TABLET ORAL at 08:00

## 2022-07-13 RX ADMIN — NYSTATIN 500000 UNITS: 100000 SUSPENSION ORAL at 10:37

## 2022-07-13 RX ADMIN — OXYCODONE AND ACETAMINOPHEN 1 TABLET: 5; 325 TABLET ORAL at 02:47

## 2022-07-13 RX ADMIN — OXYCODONE AND ACETAMINOPHEN 1 TABLET: 5; 325 TABLET ORAL at 13:13

## 2022-07-13 RX ADMIN — PANTOPRAZOLE SODIUM 40 MG: 40 TABLET, DELAYED RELEASE ORAL at 07:59

## 2022-07-13 RX ADMIN — HEPARIN SODIUM 5000 UNITS: 5000 INJECTION INTRAVENOUS; SUBCUTANEOUS at 06:58

## 2022-07-13 RX ADMIN — HEPARIN SODIUM 5000 UNITS: 5000 INJECTION INTRAVENOUS; SUBCUTANEOUS at 13:13

## 2022-07-13 RX ADMIN — NYSTATIN 500000 UNITS: 100000 SUSPENSION ORAL at 13:14

## 2022-07-13 ASSESSMENT — PAIN SCALES - GENERAL
PAINLEVEL_OUTOF10: 8
PAINLEVEL_OUTOF10: 10

## 2022-07-13 ASSESSMENT — PAIN DESCRIPTION - ORIENTATION: ORIENTATION: LOWER

## 2022-07-13 ASSESSMENT — PAIN DESCRIPTION - LOCATION
LOCATION: BACK
LOCATION: BACK

## 2022-07-13 ASSESSMENT — PAIN DESCRIPTION - DESCRIPTORS: DESCRIPTORS: ACHING

## 2022-07-13 ASSESSMENT — PAIN - FUNCTIONAL ASSESSMENT: PAIN_FUNCTIONAL_ASSESSMENT: PREVENTS OR INTERFERES SOME ACTIVE ACTIVITIES AND ADLS

## 2022-07-13 NOTE — CARE COORDINATION
07/05/22 1445   Service Assessment   Patient Orientation Sedated   Cognition   (does not respond to all questions)   History Provided By Significant Other   Support Systems Spouse/Significant Other   Patient's Healthcare Decision Maker is: Named in [facility]   PCP Verified by CM Yes   Prior Functional Level Independent in ADLs/IADLs   Current Functional Level Independent in ADLs/IADLs   Can patient return to prior living arrangement Yes   Ability to make needs known:   (sedated today)   Family able to assist with home care needs: Yes   Financial Resources Medicare   Social/Functional History   Lives With Significant other   Type of Home [address] One level   Home Access Level entry   [address]   (No DME at home)   ADL Assistance Independent   Homemaking Assistance Independent   Homemaking Responsibilities Yes   Ambulation Assistance Independent   Transfer Assistance Independent   Active  No   Occupation Retired   Discharge Planning   Living Arrangements Spouse/Significant Other
07/12/22 1328   IMM Letter   IMM Letter given to Patient/Family/Significant other/Guardian/POA/by: second letter provided to patient by Alta Braun RN   IMM Letter date given: 07/12/22   IMM Letter time given: 36   Electronically signed by Moises Steward RN on 7/12/2022 at 1:29 PM
CASE MANAGEMENT DISCHARGE SUMMARY:    DISCHARGE DATE: 7/13/2022    Discharging to Facility/ Agency   · Name: Home at [facility]  · Address:  [address] [place] [address]   · Phone:  [phone]  · Fax:  [phone]    TRANSPORTATION: via [address] [place] [address] [place]: 4:30pm    INSURANCE PRECERT OBTAINED: via [facility]    NENITA/MALIA COMPLETED: yes    COMMENTS: Patient to discharge to Home at [facility] at 4:30pm via [address]. Patient, patient's family, bedside RN, and facility aware of discharge plan/timeline.     Electronically signed by Danielito Arroyo RN on 7/13/2022 at 12:21 PM
Chart Reviewed. Goal:   Home with sign other. Continues on 10L of oxygen. Following for DC disposition.   [facility]     Case Management   [phone]    7/7/2022  8:55 AM
Chart Reviewed. Met with patient and sign other at bedside to review recommendation for SNF for her dc plan. She reports she agrees with this plan. Provided them with CMS star rated list of agencies for their review and gave education regarding Star rating system. ACP completed. They want a referral to [facility]. Referral made to [facility] for review. The Plan for Transition of Care is related to the following treatment goals: To continue her progress in personal care and ambulation needs in SNF setting. The Patient and/or patient representative, Catherine Rodriges,  was provided with a choice of provider and agrees   with the discharge plan. [x] Yes [] No    Freedom of choice list was provided with basic dialogue that supports the patient's individualized plan of care/goals, treatment preferences and shares the quality data associated with the providers.  [x] Yes [] Zoe Morales [address], [facility]     Case Management   [phone]    7/8/2022  3:11 PM
Discharge Planning:    Spoke with patient and  at bedside. I review the discharge plan and they have verbalized agreement. Plan remains for patient to discharge to a SNF. Referral had been placed to [address], still awaiting determination. I asked patient if she had additional referral preferences. Patient denies having any preferences at this time. I asked if it would be okay to research facilities within network and geographically close to their home. Patient and patient's spouse verbalized agreement. Electronically signed by Panda Valencia RN on 7/12/2022 at 1:35 PM    Patient accepted and agreeable to be discharged to [place] [address]. [employer]. Precert initiated.     Electronically signed by Panda Valencia RN on 7/12/2022 at 4:49 PM
INITIAL CASE MANAGEMENT ASSESSMENT    Reviewed chart, met with patient to assess possible discharge needs. Explained Case Management role/services. Living Situation:    Pt and sign other, Nai Hansen, live in an apartment with level entry living. He reports she is totally independent with out use of DME at home. She does not drive    ADLs:  Totally independent     DME:   None    PT/OT Recs:   TBD     Active Services:  none     Transportation:  Sign other     Medications:     [facility] [address]    PCP:   Dr Geri Saldivar      HD/PD:  neither  PLAN/COMMENTS:   Goal is to return home at discharge.        Edmundo Merino [place]     Case Management   [phone]    7/5/2022  2:49 PM
[facility] authorization received via portal:    Payor approval ID:  [id number]    [facility] Approval ID:  [id number]    Service - Location:    Dates Approved:  07/13/2022-07/15/2022    NRD:   7/15/22    Rosemary Almendarez Sr.  Administrative Assist, Case Management  21   Electronically signed by Rosemary Almendarez on 7/13/2022 at 4:13 AM
steady

## 2022-07-13 NOTE — PROGRESS NOTES
Pt left hospital via stretcher with [facility] transportation to Home at [facility].  Electronically signed by Shalini Henderson RN on 7/13/2022 at 4:54 PM

## 2022-07-13 NOTE — PROGRESS NOTES
polyethylene glycol, acetaminophen **OR** acetaminophen, glucose, dextrose bolus **OR** dextrose bolus, glucagon (rDNA), dextrose, albuterol sulfate HFA    Labs:  CBC:   Recent Labs     22  0358   WBC 14.8* 16.0* 16.5*   HGB 11.7* 12.3 11.9*   HCT 34.9* 37.5 35.3*   MCV 95.8 96.1 94.3    264 236     BMP:   Recent Labs     22  0358    136 137   K 4.0 3.5 3.3*   CL 95* 95* 96*   CO2 32 33* 31   PHOS 3.8 3.3 3.2   BUN 21* 20 16   CREATININE 0.5* 0.5* <0.5*     LIVER PROFILE:   No results for input(s): AST, ALT, LIPASE, BILIDIR, BILITOT, ALKPHOS in the last 72 hours. Invalid input(s): AMYLASE,  ALB  PT/INR: No results for input(s): PROTIME, INR in the last 72 hours. APTT: No results for input(s): APTT in the last 72 hours. UA:  No results for input(s): NITRITE, COLORU, PHUR, LABCAST, WBCUA, RBCUA, MUCUS, TRICHOMONAS, YEAST, BACTERIA, CLARITYU, SPECGRAV, LEUKOCYTESUR, UROBILINOGEN, BILIRUBINUR, BLOODU, GLUCOSEU, AMORPHOUS in the last 72 hours. Invalid input(s): Linda Nair  No results for input(s): PH, PCO2, PO2 in the last 72 hours. Films:  Chest imaging reports were reviewed and imaging was reviewed by me and showed significant disease in the right lung with relative sparing of the left lung     AB    Cultures:  Sputum:  Pseudomonas   Antigens:  Negative    I reviewed the labs and images listed above    Assessment/Plan:    Acute Hypoxic Respiratory Failure with saturations less than 90% on room air with oxygen requirements increasing   Titrate oxygen for saturations greater than or equal to 90%  o Home oxygen assessment prior to DC.  Acute on Chronic Hypercapnic Respiratory Failure, at baseline    Severe Sepsis due to pseudomonas   o Completed 7 days of cefepime, now on levaquin. Day# 13/14.  RUL pneumonia:  Pseudomonas   o 14 days of therapy.   See above   Moderately Severe COPD   o Pulmiocort

## 2022-07-13 NOTE — PROGRESS NOTES
Report call to Home at [facility].  Electronically signed by Brandon Milan RN on 7/13/2022 at 2:24 PM

## 2022-07-13 NOTE — DISCHARGE SUMMARY
Hospital Medicine Discharge Summary    Patient ID: Janelle Hamilton      Patient's PCP: Matt Figueroa APRN - CNP    Admit Date: 7/3/2022     Discharge Date:   7/13/2022      Admitting Provider: Candace Peoples MD     Discharge Provider: Braulio العلي MD     Discharge Diagnoses:  Pseudomonal pneumonia  Severe sepsis  Acute on chronic hypoxic hypercapnic respiratory failure  Moderate to severe COPD  Tobacco abuse  General medical deconditioning with ambulatory dysfunction  Acute renal failure  Diabetes mellitus type 2       Active Hospital Problems    Diagnosis     Acute respiratory failure with hypoxia ([address] [J96.01]      Priority: Medium    Community acquired pneumonia of right lung [J18.9]      Priority: Medium    Chronic obstructive pulmonary disease ([address] [J44.9]      Priority: Medium    Pneumonia of right upper lobe due to infectious organism [J18.9]      Priority: Medium    General weakness [R53.1]      Priority: Medium    Elevated lactic acid level [R79.89]      Priority: Medium    Diabetes ([address] [E11.9]     COPD exacerbation ([address] [J44.1]     Severe sepsis ([address] [A41.9, R65.20]     ROLY (acute kidney injury) ([address] [N17.9]     Acute on chronic respiratory failure with hypercapnia ([address] [J96.22]        The patient was seen and examined on day of discharge and this discharge summary is in conjunction with any daily progress note from day of discharge. Hospital Course: Patient is a 74-year-old female who was admitted to [facility] [place] with 3 to 4-day history of cough bloody sputum and dyspnea. Patient was admitted to the intensive care unit due to impending respiratory failure and severe sepsis. She was hypotensive. Patient required broad-spectrum antibiotics including cefepime and azithromycin initially. She required IV steroids. She gradually improved but had slow progress. Her sputum cultures were positive for Pseudomonas.   Medications and antibiotics were de-escalated as per recommendation of Dr. Laura Cowan  At the time of discharge her renal function had improved and she was weaned down to 4 L of oxygen. She was transitioned from cefepime for which she received complete 1 week of antibiotics to Levaquin  She will continue on prednisone  She will continue inhaled bronchodilators  She was not felt this to be safe to return home and agreed to go to [facility]          Physical Exam Performed:     BP (!) 140/69   Pulse 85   Temp 98.9 °F (37.2 °C) (Oral)   Resp 22   Ht 5' 4\" (1.626 m)   Wt 133 lb 13.1 oz (60.7 kg)   SpO2 94%   BMI 22.97 kg/m²       General appearance:  Mildly dyspneic  HEENT:  Normal cephalic, atraumatic without obvious deformity. Pupils equal, round, and reactive to light. Extra ocular muscles intact. Conjunctivae/corneas clear. Neck: Supple, with full range of motion. No jugular venous distention. Trachea midline. Respiratory:  Scattered rhonchi. Exp wheezes  Cardiovascular:  Regular rate and rhythm with normal S1/S2 without murmurs, rubs or gallops. Abdomen: Soft, non-tender, non-distended with normal bowel sounds. Musculoskeletal:  No clubbing, cyanosis or edema bilaterally. Full range of motion without deformity. Skin: Skin color, texture, turgor normal.  No rashes or lesions. Neurologic:  Neurovascularly intact without any focal sensory/motor deficits. Cranial nerves: II-XII intact, grossly non-focal.  Psychiatric:  Alert and oriented, thought content appropriate, normal insight  Capillary Refill: Brisk,< 3 seconds   Peripheral Pulses: +2 palpable, equal bilaterally       Labs:  For convenience and continuity at follow-up the following most recent labs are provided:      CBC:    Lab Results   Component Value Date/Time    WBC 16.5 07/13/2022 03:58 AM    HGB 11.9 07/13/2022 03:58 AM    HCT 35.3 07/13/2022 03:58 AM     07/13/2022 03:58 AM       Renal:    Lab Results   Component Value Date/Time     07/13/2022 03:58 AM    K 3.3 07/13/2022 03:58 AM    K 3.5 07/04/2022 04:19 AM    CL 96 07/13/2022 03:58 AM    CO2 31 07/13/2022 03:58 AM    BUN 16 07/13/2022 03:58 AM    CREATININE <0.5 07/13/2022 03:58 AM    CALCIUM 8.4 07/13/2022 03:58 AM    PHOS 3.2 07/13/2022 03:58 AM         Significant Diagnostic Studies    Radiology:   XR CHEST PORTABLE   Final Result   Airspace disease noted diffusely throughout the right lung with worsening   airspace disease within the right lower lobe. No definitive pleural effusion. XR CHEST PORTABLE   Final Result   Severe right upper lobe pneumonia, increased since the prior exam.         XR CHEST PORTABLE   Final Result   Diffuse asymmetric right-sided airspace disease, greatest in the perihilar   region, which may reflect pneumonia or asymmetric pulmonary edema. Consults:     IP CONSULT TO PULMONOLOGY  IP CONSULT TO PHARMACY    Disposition:  skilled nursing facility      Condition at Discharge: Stable    Discharge Instructions/Follow-up:  HECTOR Ford CNP      Code Status:  Full Code    Activity: activity as tolerated    Diet: cardiac diet and diabetic diet      Discharge Medications:     Current Discharge Medication List           Details   oxyCODONE-acetaminophen (PERCOCET) 5-325 MG per tablet Take 1 tablet by mouth every 6 hours as needed for Pain for up to 7 days.   Qty: 28 tablet, Refills: 0    Comments: Reduce doses taken as pain becomes manageable  Associated Diagnoses: Trochanteric bursitis of right hip; Osteoarthritis of spine with radiculopathy, lumbar region; DDD (degenerative disc disease), lumbar; Chronic pain syndrome; Fibromyalgia      predniSONE (DELTASONE) 20 MG tablet Take 2 tablets by mouth daily for 2 doses  Qty: 4 tablet, Refills: 0      levoFLOXacin (LEVAQUIN) 750 MG tablet Take 1 tablet by mouth daily for 1 dose  Qty: 1 tablet, Refills: 0      nystatin (MYCOSTATIN) 612474 UNIT/ML suspension Take 5 mLs by mouth 4 times daily  Qty: 60 mL, Refills: 0      benzocaine-menthol (CEPACOL SORE THROAT) 15-3.6 MG lozenge Take 1 lozenge by mouth every 2 hours as needed for Sore Throat  Qty: 30 lozenge, Refills: 0              Details   gabapentin (NEURONTIN) 600 MG tablet Take 1 tablet by mouth 2 times daily for 30 days. Qty: 60 tablet, Refills: 0    Associated Diagnoses: Chronic pain syndrome; Degeneration of lumbar or lumbosacral intervertebral disc; Fibromyalgia; Lumbar spondylosis; Trochanteric bursitis of right hip; DDD (degenerative disc disease), lumbar; Osteoarthritis of spine with radiculopathy, lumbar region              Details   DULoxetine (CYMBALTA) 60 MG extended release capsule TAKE 1 CAPSULE BY MOUTH TWICE A DAY  Qty: 60 capsule, Refills: 2    Associated Diagnoses: Chronic pain syndrome; Moderate episode of recurrent major depressive disorder (HCC)      pantoprazole (PROTONIX) 40 MG tablet Take 1 tablet by mouth daily  Qty: 30 tablet, Refills: 1    Associated Diagnoses: Chronic pain syndrome      atorvastatin (LIPITOR) 80 MG tablet TAKE 1 TABLET BY MOUTH EVERY DAY FOR CHOLESTEROL  Qty: 90 tablet, Refills: 1    Associated Diagnoses: Hypercholesteremia      tiZANidine (ZANAFLEX) 4 MG tablet Take 1 tablet by mouth nightly  Qty: 30 tablet, Refills: 1    Associated Diagnoses: Chronic pain syndrome; Fibromyalgia; DDD (degenerative disc disease), lumbar      meloxicam (MOBIC) 15 MG tablet Take 1 tablet by mouth daily  Qty: 30 tablet, Refills: 1    Associated Diagnoses: Chronic pain syndrome; Fibromyalgia; DDD (degenerative disc disease), lumbar; Lumbar spondylosis;  Osteoarthritis of spine with radiculopathy, lumbar region; Degeneration of lumbar or lumbosacral intervertebral disc; Trochanteric bursitis of right hip      traZODone (DESYREL) 50 MG tablet Take 1-2 tablets by mouth nightly  Qty: 60 tablet, Refills: 1    Associated Diagnoses: Chronic pain syndrome      Fluticasone-Umeclidin-Vilant (TRELEGY ELLIPTA) 200-62.5-25 MCG/INH AEPB Inhale 1 Inhaler into the lungs daily  Qty: 1 each, Refills: 5    Associated Diagnoses: COPD, severe (HCC)      magnesium oxide (MGO) 400 (241.3 Mg) MG TABS tablet Take 1 tablet by mouth 2 times daily as needed (for cramping)  Qty: 60 tablet, Refills: 0    Associated Diagnoses: Chronic pain syndrome; Muscle cramping      alendronate (FOSAMAX) 70 MG tablet TAKE 1 TABLET BY MOUTH ONE TIME PER WEEK  Qty: 12 tablet, Refills: 1    Associated Diagnoses: Age-related osteoporosis without current pathological fracture      albuterol sulfate  (90 Base) MCG/ACT inhaler Inhale 2 puffs into the lungs every 6 hours as needed for Shortness of Breath  Qty: 1 each, Refills: 11    Associated Diagnoses: COPD, severe (HCC)      Calcium Citrate-Vitamin D 500-500 MG-UNIT CHEW Take 1 tablet by mouth 2 times daily    Associated Diagnoses: Age-related osteoporosis without current pathological fracture      Blood Glucose Monitoring Suppl (ONE TOUCH ULTRA MINI) w/Device KIT 1 kit by Does not apply route daily  Qty: 1 kit, Refills: 0    Associated Diagnoses: New onset type 2 diabetes mellitus (HCC)      ONE TOUCH LANCETS MISC 1 each by Does not apply route daily  Qty: 100 each, Refills: 3    Associated Diagnoses: New onset type 2 diabetes mellitus (HCC)      blood glucose test strips (ASCENSIA AUTODISC VI;ONE TOUCH ULTRA TEST VI) strip 1 each by In Vitro route daily  Qty: 100 each, Refills: 3    Associated Diagnoses: New onset type 2 diabetes mellitus (HCC)      ipratropium-albuterol (DUONEB) 0.5-2.5 (3) MG/3ML SOLN nebulizer solution Take 1 aerosol every 4 hours for 2 days and then as needed for shortness of breath coughing and wheezing  Qty: 360 mL, Refills: 3      Cholecalciferol (VITAMIN D) 2000 units TABS tablet Take 1 tablet by mouth daily  Qty: 30 tablet      cetirizine (ZYRTEC) 10 MG tablet Take 10 mg by mouth daily as needed for Allergies      Nebulizers (COMPRESSOR/NEBULIZER) MISC 1 Units by Does not apply route 4 times daily  Qty: 1 each, Refills: 3             Time Spent on discharge is more than 30 minutes in the examination, evaluation, counseling and review of medications and discharge plan. Signed:    Kayla Rao MD   7/13/2022      Thank you HECTOR White CNP for the opportunity to be involved in this patient's care. If you have any questions or concerns, please feel free to contact me at [phone].

## 2022-07-13 NOTE — PROGRESS NOTES
Pt o2 bumped to 7L with transfer to commode. 02 sat 90% on 7L with transfers. Pt returned to bed. Recovery time 1-2 minutes with SOB and 02 sat 87%. Pt returned to 4L while resting in bed.  Electronically signed by Augustina Leggett RN on 7/13/2022 at 11:43 AM

## 2022-07-18 ENCOUNTER — HOSPITAL ENCOUNTER (INPATIENT)
Age: 77
LOS: 29 days | Discharge: HOSPICE | DRG: 246 | End: 2022-08-16
Attending: EMERGENCY MEDICINE | Admitting: INTERNAL MEDICINE
Payer: MEDICARE

## 2022-07-18 ENCOUNTER — APPOINTMENT (OUTPATIENT)
Dept: GENERAL RADIOLOGY | Age: 77
DRG: 246 | End: 2022-07-18
Payer: MEDICARE

## 2022-07-18 ENCOUNTER — TELEPHONE (OUTPATIENT)
Dept: OTHER | Facility: CLINIC | Age: 77
End: 2022-07-18

## 2022-07-18 DIAGNOSIS — R09.02 HYPOXIA: ICD-10-CM

## 2022-07-18 DIAGNOSIS — I21.4 NSTEMI (NON-ST ELEVATED MYOCARDIAL INFARCTION) (HCC): Primary | ICD-10-CM

## 2022-07-18 DIAGNOSIS — J85.1 ABSCESS OF UPPER LOBE OF RIGHT LUNG WITH PNEUMONIA (HCC): ICD-10-CM

## 2022-07-18 DIAGNOSIS — J18.9 PNEUMONIA OF RIGHT LUNG DUE TO INFECTIOUS ORGANISM, UNSPECIFIED PART OF LUNG: ICD-10-CM

## 2022-07-18 PROBLEM — R94.31 ABNORMAL EKG: Status: ACTIVE | Noted: 2022-07-18

## 2022-07-18 PROBLEM — J96.22 ACUTE ON CHRONIC RESPIRATORY FAILURE WITH HYPOXIA AND HYPERCAPNIA (HCC): Status: ACTIVE | Noted: 2022-07-18

## 2022-07-18 PROBLEM — J96.21 ACUTE ON CHRONIC RESPIRATORY FAILURE WITH HYPOXIA (HCC): Status: ACTIVE | Noted: 2022-07-18

## 2022-07-18 LAB
A/G RATIO: 0.8 (ref 1.1–2.2)
ALBUMIN SERPL-MCNC: 2.4 G/DL (ref 3.4–5)
ALP BLD-CCNC: 88 U/L (ref 40–129)
ALT SERPL-CCNC: 65 U/L (ref 10–40)
ANION GAP SERPL CALCULATED.3IONS-SCNC: 9 MMOL/L (ref 3–16)
APTT: 35.6 SEC (ref 23–34.3)
AST SERPL-CCNC: 61 U/L (ref 15–37)
BASE EXCESS VENOUS: 9.8 MMOL/L
BASOPHILS ABSOLUTE: 0 K/UL (ref 0–0.2)
BASOPHILS RELATIVE PERCENT: 0.1 %
BILIRUB SERPL-MCNC: 0.5 MG/DL (ref 0–1)
BUN BLDV-MCNC: 20 MG/DL (ref 7–20)
CALCIUM SERPL-MCNC: 8.8 MG/DL (ref 8.3–10.6)
CARBOXYHEMOGLOBIN: 1.2 %
CHLORIDE BLD-SCNC: 95 MMOL/L (ref 99–110)
CO2: 31 MMOL/L (ref 21–32)
CREAT SERPL-MCNC: 1 MG/DL (ref 0.6–1.2)
EOSINOPHILS ABSOLUTE: 0.1 K/UL (ref 0–0.6)
EOSINOPHILS RELATIVE PERCENT: 1.4 %
GFR AFRICAN AMERICAN: >60
GFR NON-AFRICAN AMERICAN: 54
GLUCOSE BLD-MCNC: 145 MG/DL (ref 70–99)
GLUCOSE BLD-MCNC: 152 MG/DL (ref 70–99)
HCO3 VENOUS: 36 MMOL/L (ref 23–29)
HCT VFR BLD CALC: 31.7 % (ref 36–48)
HEMOGLOBIN: 10.8 G/DL (ref 12–16)
LACTIC ACID, SEPSIS: 1.5 MMOL/L (ref 0.4–1.9)
LACTIC ACID, SEPSIS: 1.5 MMOL/L (ref 0.4–1.9)
LYMPHOCYTES ABSOLUTE: 0.6 K/UL (ref 1–5.1)
LYMPHOCYTES RELATIVE PERCENT: 6.6 %
MCH RBC QN AUTO: 31.8 PG (ref 26–34)
MCHC RBC AUTO-ENTMCNC: 33.9 G/DL (ref 31–36)
MCV RBC AUTO: 93.8 FL (ref 80–100)
METHEMOGLOBIN VENOUS: 0.4 %
MONOCYTES ABSOLUTE: 0.4 K/UL (ref 0–1.3)
MONOCYTES RELATIVE PERCENT: 4.4 %
NEUTROPHILS ABSOLUTE: 7.3 K/UL (ref 1.7–7.7)
NEUTROPHILS RELATIVE PERCENT: 87.5 %
O2 SAT, VEN: 37 %
O2 THERAPY: ABNORMAL
PCO2, VEN: 56.8 MMHG (ref 40–50)
PDW BLD-RTO: 13.8 % (ref 12.4–15.4)
PERFORMED ON: ABNORMAL
PH VENOUS: 7.41 (ref 7.35–7.45)
PLATELET # BLD: 416 K/UL (ref 135–450)
PMV BLD AUTO: 7.8 FL (ref 5–10.5)
PO2, VEN: <30 MMHG
POTASSIUM REFLEX MAGNESIUM: 3.9 MMOL/L (ref 3.5–5.1)
PRO-BNP: ABNORMAL PG/ML (ref 0–449)
PROCALCITONIN: 0.21 NG/ML (ref 0–0.15)
RBC # BLD: 3.38 M/UL (ref 4–5.2)
SARS-COV-2, NAAT: NOT DETECTED
SODIUM BLD-SCNC: 135 MMOL/L (ref 136–145)
TCO2 CALC VENOUS: 38 MMOL/L
TOTAL PROTEIN: 5.6 G/DL (ref 6.4–8.2)
TROPONIN: 0.52 NG/ML
TROPONIN: 0.7 NG/ML
WBC # BLD: 8.3 K/UL (ref 4–11)

## 2022-07-18 PROCEDURE — 87635 SARS-COV-2 COVID-19 AMP PRB: CPT

## 2022-07-18 PROCEDURE — 84484 ASSAY OF TROPONIN QUANT: CPT

## 2022-07-18 PROCEDURE — 85025 COMPLETE CBC W/AUTO DIFF WBC: CPT

## 2022-07-18 PROCEDURE — 6370000000 HC RX 637 (ALT 250 FOR IP): Performed by: EMERGENCY MEDICINE

## 2022-07-18 PROCEDURE — 2580000003 HC RX 258: Performed by: EMERGENCY MEDICINE

## 2022-07-18 PROCEDURE — 94640 AIRWAY INHALATION TREATMENT: CPT

## 2022-07-18 PROCEDURE — 6360000002 HC RX W HCPCS: Performed by: EMERGENCY MEDICINE

## 2022-07-18 PROCEDURE — 85730 THROMBOPLASTIN TIME PARTIAL: CPT

## 2022-07-18 PROCEDURE — 2700000000 HC OXYGEN THERAPY PER DAY

## 2022-07-18 PROCEDURE — 96374 THER/PROPH/DIAG INJ IV PUSH: CPT

## 2022-07-18 PROCEDURE — 84145 PROCALCITONIN (PCT): CPT

## 2022-07-18 PROCEDURE — 1200000000 HC SEMI PRIVATE

## 2022-07-18 PROCEDURE — 82803 BLOOD GASES ANY COMBINATION: CPT

## 2022-07-18 PROCEDURE — 83880 ASSAY OF NATRIURETIC PEPTIDE: CPT

## 2022-07-18 PROCEDURE — 71045 X-RAY EXAM CHEST 1 VIEW: CPT

## 2022-07-18 PROCEDURE — 94761 N-INVAS EAR/PLS OXIMETRY MLT: CPT

## 2022-07-18 PROCEDURE — 93005 ELECTROCARDIOGRAM TRACING: CPT | Performed by: EMERGENCY MEDICINE

## 2022-07-18 PROCEDURE — 2500000003 HC RX 250 WO HCPCS: Performed by: EMERGENCY MEDICINE

## 2022-07-18 PROCEDURE — 87040 BLOOD CULTURE FOR BACTERIA: CPT

## 2022-07-18 PROCEDURE — 80053 COMPREHEN METABOLIC PANEL: CPT

## 2022-07-18 PROCEDURE — 96375 TX/PRO/DX INJ NEW DRUG ADDON: CPT

## 2022-07-18 PROCEDURE — 36415 COLL VENOUS BLD VENIPUNCTURE: CPT

## 2022-07-18 PROCEDURE — 6370000000 HC RX 637 (ALT 250 FOR IP): Performed by: INTERNAL MEDICINE

## 2022-07-18 PROCEDURE — 99285 EMERGENCY DEPT VISIT HI MDM: CPT

## 2022-07-18 PROCEDURE — 83605 ASSAY OF LACTIC ACID: CPT

## 2022-07-18 RX ORDER — TIZANIDINE 4 MG/1
4 TABLET ORAL NIGHTLY
Status: DISCONTINUED | OUTPATIENT
Start: 2022-07-18 | End: 2022-08-16 | Stop reason: HOSPADM

## 2022-07-18 RX ORDER — ATORVASTATIN CALCIUM 80 MG/1
80 TABLET, FILM COATED ORAL NIGHTLY
Status: DISCONTINUED | OUTPATIENT
Start: 2022-07-18 | End: 2022-07-19

## 2022-07-18 RX ORDER — ALBUTEROL SULFATE 90 UG/1
2 AEROSOL, METERED RESPIRATORY (INHALATION) EVERY 6 HOURS PRN
Status: DISCONTINUED | OUTPATIENT
Start: 2022-07-18 | End: 2022-08-16 | Stop reason: HOSPADM

## 2022-07-18 RX ORDER — HEPARIN SODIUM 1000 [USP'U]/ML
30 INJECTION, SOLUTION INTRAVENOUS; SUBCUTANEOUS PRN
Status: DISCONTINUED | OUTPATIENT
Start: 2022-07-18 | End: 2022-07-18

## 2022-07-18 RX ORDER — HEPARIN SODIUM 1000 [USP'U]/ML
60 INJECTION, SOLUTION INTRAVENOUS; SUBCUTANEOUS PRN
Status: DISCONTINUED | OUTPATIENT
Start: 2022-07-18 | End: 2022-07-18

## 2022-07-18 RX ORDER — INSULIN LISPRO 100 [IU]/ML
0-12 INJECTION, SOLUTION INTRAVENOUS; SUBCUTANEOUS
Status: DISCONTINUED | OUTPATIENT
Start: 2022-07-19 | End: 2022-08-03

## 2022-07-18 RX ORDER — IPRATROPIUM BROMIDE AND ALBUTEROL SULFATE 2.5; .5 MG/3ML; MG/3ML
3 SOLUTION RESPIRATORY (INHALATION) ONCE
Status: COMPLETED | OUTPATIENT
Start: 2022-07-18 | End: 2022-07-18

## 2022-07-18 RX ORDER — HEPARIN SODIUM 1000 [USP'U]/ML
3700 INJECTION, SOLUTION INTRAVENOUS; SUBCUTANEOUS ONCE
Status: COMPLETED | OUTPATIENT
Start: 2022-07-18 | End: 2022-07-18

## 2022-07-18 RX ORDER — SODIUM CHLORIDE 0.9 % (FLUSH) 0.9 %
10 SYRINGE (ML) INJECTION EVERY 12 HOURS SCHEDULED
Status: DISCONTINUED | OUTPATIENT
Start: 2022-07-18 | End: 2022-07-22 | Stop reason: SDUPTHER

## 2022-07-18 RX ORDER — ASPIRIN 81 MG/1
324 TABLET, CHEWABLE ORAL ONCE
Status: COMPLETED | OUTPATIENT
Start: 2022-07-18 | End: 2022-07-18

## 2022-07-18 RX ORDER — DULOXETIN HYDROCHLORIDE 60 MG/1
60 CAPSULE, DELAYED RELEASE ORAL 2 TIMES DAILY
Status: DISCONTINUED | OUTPATIENT
Start: 2022-07-18 | End: 2022-08-16 | Stop reason: HOSPADM

## 2022-07-18 RX ORDER — SODIUM CHLORIDE 9 MG/ML
INJECTION, SOLUTION INTRAVENOUS PRN
Status: DISCONTINUED | OUTPATIENT
Start: 2022-07-18 | End: 2022-07-22 | Stop reason: SDUPTHER

## 2022-07-18 RX ORDER — IPRATROPIUM BROMIDE AND ALBUTEROL SULFATE 2.5; .5 MG/3ML; MG/3ML
1 SOLUTION RESPIRATORY (INHALATION) EVERY 4 HOURS PRN
Status: DISCONTINUED | OUTPATIENT
Start: 2022-07-18 | End: 2022-08-16 | Stop reason: HOSPADM

## 2022-07-18 RX ORDER — POLYETHYLENE GLYCOL 3350 17 G/17G
17 POWDER, FOR SOLUTION ORAL DAILY PRN
Status: DISCONTINUED | OUTPATIENT
Start: 2022-07-18 | End: 2022-08-16 | Stop reason: HOSPADM

## 2022-07-18 RX ORDER — TRAZODONE HYDROCHLORIDE 100 MG/1
100 TABLET ORAL NIGHTLY PRN
Status: DISCONTINUED | OUTPATIENT
Start: 2022-07-18 | End: 2022-08-16 | Stop reason: HOSPADM

## 2022-07-18 RX ORDER — ACETAMINOPHEN 325 MG/1
650 TABLET ORAL EVERY 4 HOURS PRN
Status: DISCONTINUED | OUTPATIENT
Start: 2022-07-18 | End: 2022-07-27 | Stop reason: SDUPTHER

## 2022-07-18 RX ORDER — DEXTROSE MONOHYDRATE 50 MG/ML
100 INJECTION, SOLUTION INTRAVENOUS PRN
Status: DISCONTINUED | OUTPATIENT
Start: 2022-07-18 | End: 2022-08-16 | Stop reason: HOSPADM

## 2022-07-18 RX ORDER — HEPARIN SODIUM 10000 [USP'U]/100ML
0-3000 INJECTION, SOLUTION INTRAVENOUS CONTINUOUS
Status: DISCONTINUED | OUTPATIENT
Start: 2022-07-18 | End: 2022-07-21

## 2022-07-18 RX ORDER — SODIUM CHLORIDE 0.9 % (FLUSH) 0.9 %
10 SYRINGE (ML) INJECTION PRN
Status: DISCONTINUED | OUTPATIENT
Start: 2022-07-18 | End: 2022-07-22 | Stop reason: SDUPTHER

## 2022-07-18 RX ORDER — GABAPENTIN 300 MG/1
600 CAPSULE ORAL 2 TIMES DAILY
Status: DISCONTINUED | OUTPATIENT
Start: 2022-07-18 | End: 2022-08-16 | Stop reason: HOSPADM

## 2022-07-18 RX ORDER — ACETAMINOPHEN 650 MG/1
650 SUPPOSITORY RECTAL EVERY 4 HOURS PRN
Status: DISCONTINUED | OUTPATIENT
Start: 2022-07-18 | End: 2022-07-27 | Stop reason: SDUPTHER

## 2022-07-18 RX ORDER — INSULIN LISPRO 100 [IU]/ML
0-6 INJECTION, SOLUTION INTRAVENOUS; SUBCUTANEOUS NIGHTLY
Status: DISCONTINUED | OUTPATIENT
Start: 2022-07-18 | End: 2022-08-03

## 2022-07-18 RX ORDER — PANTOPRAZOLE SODIUM 40 MG/1
40 TABLET, DELAYED RELEASE ORAL DAILY
Status: DISCONTINUED | OUTPATIENT
Start: 2022-07-19 | End: 2022-08-03

## 2022-07-18 RX ORDER — ONDANSETRON 2 MG/ML
4 INJECTION INTRAMUSCULAR; INTRAVENOUS EVERY 4 HOURS PRN
Status: DISCONTINUED | OUTPATIENT
Start: 2022-07-18 | End: 2022-08-16 | Stop reason: HOSPADM

## 2022-07-18 RX ORDER — CETIRIZINE HYDROCHLORIDE 10 MG/1
10 TABLET ORAL DAILY PRN
Status: DISCONTINUED | OUTPATIENT
Start: 2022-07-18 | End: 2022-08-16 | Stop reason: HOSPADM

## 2022-07-18 RX ADMIN — HEPARIN SODIUM 740 UNITS/HR: 10000 INJECTION, SOLUTION INTRAVENOUS at 19:02

## 2022-07-18 RX ADMIN — IPRATROPIUM BROMIDE AND ALBUTEROL SULFATE 3 AMPULE: .5; 3 SOLUTION RESPIRATORY (INHALATION) at 19:37

## 2022-07-18 RX ADMIN — DULOXETINE HYDROCHLORIDE 60 MG: 60 CAPSULE, DELAYED RELEASE ORAL at 23:31

## 2022-07-18 RX ADMIN — ASPIRIN 81 MG 324 MG: 81 TABLET ORAL at 18:11

## 2022-07-18 RX ADMIN — CEFEPIME 2000 MG: 2 INJECTION, POWDER, FOR SOLUTION INTRAVENOUS at 19:06

## 2022-07-18 RX ADMIN — AZITHROMYCIN MONOHYDRATE 500 MG: 500 INJECTION, POWDER, LYOPHILIZED, FOR SOLUTION INTRAVENOUS at 19:40

## 2022-07-18 RX ADMIN — ATORVASTATIN CALCIUM 80 MG: 80 TABLET, FILM COATED ORAL at 23:32

## 2022-07-18 RX ADMIN — INSULIN LISPRO 1 UNITS: 100 INJECTION, SOLUTION INTRAVENOUS; SUBCUTANEOUS at 23:32

## 2022-07-18 RX ADMIN — GABAPENTIN 600 MG: 300 CAPSULE ORAL at 23:32

## 2022-07-18 RX ADMIN — HEPARIN SODIUM 3700 UNITS: 1000 INJECTION INTRAVENOUS; SUBCUTANEOUS at 18:58

## 2022-07-18 ASSESSMENT — PAIN DESCRIPTION - PAIN TYPE
TYPE: CHRONIC PAIN

## 2022-07-18 ASSESSMENT — PAIN SCALES - GENERAL
PAINLEVEL_OUTOF10: 10
PAINLEVEL_OUTOF10: 10
PAINLEVEL_OUTOF10: 8
PAINLEVEL_OUTOF10: 9

## 2022-07-18 ASSESSMENT — PAIN - FUNCTIONAL ASSESSMENT
PAIN_FUNCTIONAL_ASSESSMENT: 0-10
PAIN_FUNCTIONAL_ASSESSMENT: PREVENTS OR INTERFERES SOME ACTIVE ACTIVITIES AND ADLS

## 2022-07-18 ASSESSMENT — PAIN DESCRIPTION - ONSET
ONSET: ON-GOING

## 2022-07-18 ASSESSMENT — PAIN DESCRIPTION - LOCATION
LOCATION: BACK

## 2022-07-18 ASSESSMENT — PAIN DESCRIPTION - DESCRIPTORS
DESCRIPTORS: ACHING
DESCRIPTORS: ACHING
DESCRIPTORS: PATIENT UNABLE TO DESCRIBE

## 2022-07-18 ASSESSMENT — PAIN DESCRIPTION - FREQUENCY
FREQUENCY: CONTINUOUS

## 2022-07-18 ASSESSMENT — PAIN DESCRIPTION - ORIENTATION
ORIENTATION: LOWER

## 2022-07-18 NOTE — PROGRESS NOTES
Clinical Pharmacy Note  Heparin Dosing Consult    Mike Ayers is a 78 y.o. female ordered heparin per low dose nomogram by Dr. Conor Proctor. Lab Results   Component Value Date/Time    APTT 35.6 07/18/2022 06:41 PM     Lab Results   Component Value Date/Time    HGB 10.8 07/18/2022 05:20 PM    HCT 31.7 07/18/2022 05:20 PM     07/18/2022 05:20 PM       Ht Readings from Last 1 Encounters:   07/18/22 5' 4\" (1.626 m)        Wt Readings from Last 1 Encounters:   07/18/22 135 lb 9.3 oz (61.5 kg)        Assessment/Plan:  Initial bolus: 3700 units  Initial infusion rate: 740 units/hr  Next aPTT: 0100 on 7-19-22    Pharmacy will continue to monitor adjust heparin based on aPTT results using nomogram below:     CAD/STEMI/NSTEMI/UA/AFIB Heparin Nomogram     Initial Bolus: 60 units/kg Max Bolus: 4,000 units       Initial Rate: 12 units/kg/hr Max Initial Rate: 1,000 units/hr     aPTT < 59    Heparin 60 units/kg bolus Increase infusion by 4 units/kg/hr        (maximum 4,000 units)   aPTT 59.1-72.9 Heparin 30 units/kg bolus Increase infusion by 2 units/kg/hr        (maximum 2,000 units)   aPTT     No bolus   No change   aPTT 102.1-109 No bolus   Decrease infusion by 1 units/kg/hr   aPTT 109.1-122.9 No bolus     Decrease infusion by 2 units/kg/hr   aPTT > 123    Hold heparin for 1 hour Decrease infusion by 3 units/kg/hr     Obtain aPTT 6 hours after initial bolus and 6 hours after any dose change until two consecutive therapeutic aPTTs are achieved - then daily.    SHAHRAM Motley [facility]  7/18/2022  7:34 PM

## 2022-07-18 NOTE — PROGRESS NOTES
Pharmacy Medication Reconciliation Note     List of medications Fanny Jackson is currently taking is complete. Source of information:   1. Conversation with patient at bedside  2. EMR    Notes regarding home medications:   1. Patient reports taking most of her medications today-- only needs evening medications. Patient denies taking any OTC or herbal medications other than those listed.      Liliya Mahoney, Pharmacy Intern  7/18/2022  7:57 PM

## 2022-07-18 NOTE — ED PROVIDER NOTES
[facility]      CHIEF COMPLAINT  Shortness of Breath (At NH for rehab for pneumonia increased sob over the last few days. Pt on 4-5 lpm via nc all the time. Per EMS the cord was very long ems placed on 6lpm via nc and o2 sat came up to 99%)       HISTORY OF PRESENT ILLNESS  Rohan Harris is a 78 y.o. female with history of COPD and recent admission to the hospital for Pseudomonas pneumonia presents to the emergency department for evaluation of shortness of breath. Patient reports she was in the hospital for 2 weeks and just discharged 3 days ago to a rehab facility. Despite taking her antibiotics, she was told by her doctor that her breathing did not seem improved and was sent back to the emergency department. Care, I spoke with the daughter who says patient has been having intermittent increased oxygen requirement and was told that they were sending her back because the chest x-ray appeared like she had fluid collection over along the right side of her lungs. Patient denies having any chest pain, chest pressure, tightness. Reports just having shortness of breath which has been ongoing for several weeks. No other complaints, modifying factors or associated symptoms. I have reviewed the following from the nursing documentation.     Past Medical History:   Diagnosis Date    Chronic pain syndrome     Colorectal polyps     COPD (chronic obstructive pulmonary disease) ([facility])     CTS (carpal tunnel syndrome)     BILATERAL    DDD (degenerative disc disease), lumbar     Depression     Family hx of colon cancer     Fibromyalgia     Hyperlipemia     IBS (irritable bowel syndrome)     Lumbar spondylosis     New onset type 2 diabetes mellitus ([facility] [address] 2/3/2020    Urticaria, chronic      Past Surgical History:   Procedure Laterality Date    CARPAL TUNNEL RELEASE Bilateral     CERVICAL POLYP REMOVAL  9/2004    INTRACAPSULAR CATARACT EXTRACTION Left 6/23/2020    Phacoemulsification with intraocular lens implant performed by Preston Mccabe MD at [address] Right 7/14/2020    Phacoemulsification with intraocular lens implant performed by Preston Mccabe MD at [address]      patient denies      Family History   Problem Relation Age of Onset    Cancer Father         COLON     Alzheimer's Disease Mother     Heart Disease Brother     Heart Failure Brother     Diabetes Daughter     Diabetes Daughter     Diabetes Daughter      Social History     Socioeconomic History    Marital status: Single     Spouse name: Not on file    Number of children: 4    Years of education: Not on file    Highest education level: Not on file   Occupational History    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   Tobacco Use    Smoking status: Every Day     Packs/day: 1.00     Years: 55.00     Pack years: 55.00     Types: Cigarettes     Start date: 1/1/1962    Smokeless tobacco: Never    Tobacco comments:     almost ready to quit   Vaping Use    Vaping Use: Never used   Substance and Sexual Activity    Alcohol use: No     Alcohol/week: 0.0 standard drinks    Drug use: No    Sexual activity: Yes     Partners: Male   Other Topics Concern    Not on file   Social History Narrative    Not on file     Social Determinants of Health     Financial Resource Strain: Unknown    Difficulty of Paying Living Expenses: Patient refused   Food Insecurity: Unknown    Worried About Running Out of Food in the Last Year: Patient refused    Ran Out of Food in the Last Year: Patient refused   Transportation Needs: Not on file   Physical Activity: Not on file   Stress: Not on file   Social Connections: Not on file   Intimate Partner Violence: Not on file   Housing Stability: Not on file     Current Facility-Administered Medications   Medication Dose Route Frequency Provider Last Rate Last Admin    heparin 25,000 unit in sodium chloride 0.45% 250 mL (premix) infusion  0-3,000 Units/hr IntraVENous Continuous Rosy Chao MD 7.4 mL/hr at 07/18/22 1902 740 Units/hr at 07/18/22 1902    azithromycin (ZITHROMAX) 500 mg in D5W 250ml addavial  500 mg IntraVENous Once Rosy Chao MD         Current Outpatient Medications   Medication Sig Dispense Refill    gabapentin (NEURONTIN) 600 MG tablet Take 1 tablet by mouth 2 times daily for 30 days. 60 tablet 0    oxyCODONE-acetaminophen (PERCOCET) 5-325 MG per tablet Take 1 tablet by mouth every 6 hours as needed for Pain for up to 7 days.  28 tablet 0    nystatin (MYCOSTATIN) 670005 UNIT/ML suspension Take 5 mLs by mouth 4 times daily 60 mL 0    benzocaine-menthol (CEPACOL SORE THROAT) 15-3.6 MG lozenge Take 1 lozenge by mouth every 2 hours as needed for Sore Throat 30 lozenge 0    DULoxetine (CYMBALTA) 60 MG extended release capsule TAKE 1 CAPSULE BY MOUTH TWICE A DAY 60 capsule 2    pantoprazole (PROTONIX) 40 MG tablet Take 1 tablet by mouth daily 30 tablet 1    atorvastatin (LIPITOR) 80 MG tablet TAKE 1 TABLET BY MOUTH EVERY DAY FOR CHOLESTEROL 90 tablet 1    tiZANidine (ZANAFLEX) 4 MG tablet Take 1 tablet by mouth nightly 30 tablet 1    meloxicam (MOBIC) 15 MG tablet Take 1 tablet by mouth daily 30 tablet 1    traZODone (DESYREL) 50 MG tablet Take 1-2 tablets by mouth nightly 60 tablet 1    Fluticasone-Umeclidin-Vilant (TRELEGY ELLIPTA) 200-62.5-25 MCG/INH AEPB Inhale 1 Inhaler into the lungs daily 1 each 5    magnesium oxide (MGO) 400 (241.3 Mg) MG TABS tablet Take 1 tablet by mouth 2 times daily as needed (for cramping) 60 tablet 0    alendronate (FOSAMAX) 70 MG tablet TAKE 1 TABLET BY MOUTH ONE TIME PER WEEK 12 tablet 1    albuterol sulfate  (90 Base) MCG/ACT inhaler Inhale 2 puffs into the lungs every 6 hours as needed for Shortness of Breath 1 each 11    Calcium Citrate-Vitamin D 500-500 MG-UNIT CHEW Take 1 tablet by mouth 2 times daily      ONE TOUCH LANCETS MISC 1 each by Does not apply route daily 100 each 3    ipratropium-albuterol (DUONEB) 0.5-2.5 (3) MG/3ML SOLN nebulizer solution Take 1 aerosol every 4 hours for 2 days and then as needed for shortness of breath coughing and wheezing 360 mL 3    Cholecalciferol (VITAMIN D) 2000 units TABS tablet Take 1 tablet by mouth daily 30 tablet     cetirizine (ZYRTEC) 10 MG tablet Take 10 mg by mouth daily as needed for Allergies      Nebulizers (COMPRESSOR/NEBULIZER) MISC 1 Units by Does not apply route 4 times daily 1 each 3     No Known Allergies    PMH, Surgical Hx, FH, Social Hx reviewed by myself. REVIEW OF SYSTEMS  10 systems reviewed, pertinent positives per HPI otherwise noted to be negative. PHYSICAL EXAM  BP (!) 109/52   Pulse 99   Temp 99 °F (37.2 °C) (Oral)   Resp 22   Ht 5' 4\" (1.626 m)   Wt 135 lb 9.3 oz (61.5 kg)   SpO2 100%   BMI 23.27 kg/m²    GENERAL APPEARANCE: Awake and alert. Ill appearing. Requiring 6L NC O2 to maintain sat >90  HENT: Normocephalic. Atraumatic. EOMI. No facial droop. HEART/CHEST: RRR. LUNGS: Requiring 6L NC O2 to maintain sat >90. Wheezing on auscultation   ABDOMEN: Soft, non-distended abdomen. Non tender to palpation. No guarding. No rebound. EXTREMITIES: No gross deformities. Moving all extremities. No significant lower extremity edema   SKIN: Warm and dry. No acute rashes. NEUROLOGICAL: Alert and oriented. No gross facial drooping. Answering questions appropriately. Moving all extremities. PSYCHIATRIC: Pleasant. Normal mood and affect.     LABS  Results for orders placed or performed during the hospital encounter of 07/18/22   COVID-19, Rapid    Specimen: Nasopharyngeal Swab   Result Value Ref Range    SARS-CoV-2, NAAT Not Detected Not Detected   CBC with Auto Differential   Result Value Ref Range    WBC 8.3 4.0 - 11.0 K/uL    RBC 3.38 (L) 4.00 - 5.20 M/uL    Hemoglobin 10.8 (L) 12.0 - 16.0 g/dL    Hematocrit 31.7 (L) 36.0 - 48.0 %    MCV 93.8 80.0 - 100.0 fL    MCH 31.8 26.0 - 34.0 pg    MCHC 33.9 31.0 - 36.0 g/dL    RDW 13.8 12.4 - 15.4 %    Platelets 339 833 - 815 K/uL    MPV 7.8 5.0 - 10.5 fL    Neutrophils % 87.5 %    Lymphocytes % 6.6 %    Monocytes % 4.4 %    Eosinophils % 1.4 %    Basophils % 0.1 %    Neutrophils Absolute 7.3 1.7 - 7.7 K/uL    Lymphocytes Absolute 0.6 (L) 1.0 - 5.1 K/uL    Monocytes Absolute 0.4 0.0 - 1.3 K/uL    Eosinophils Absolute 0.1 0.0 - 0.6 K/uL    Basophils Absolute 0.0 0.0 - 0.2 K/uL   Comprehensive Metabolic Panel w/ Reflex to MG   Result Value Ref Range    Sodium 135 (L) 136 - 145 mmol/L    Potassium reflex Magnesium 3.9 3.5 - 5.1 mmol/L    Chloride 95 (L) 99 - 110 mmol/L    CO2 31 21 - 32 mmol/L    Anion Gap 9 3 - 16    Glucose 145 (H) 70 - 99 mg/dL    BUN 20 7 - 20 mg/dL    CREATININE 1.0 0.6 - 1.2 mg/dL    GFR Non-African American 54 (A) >60    GFR African American >60 >60    Calcium 8.8 8.3 - 10.6 mg/dL    Total Protein 5.6 (L) 6.4 - 8.2 g/dL    Albumin 2.4 (L) 3.4 - 5.0 g/dL    Albumin/Globulin Ratio 0.8 (L) 1.1 - 2.2    Total Bilirubin 0.5 0.0 - 1.0 mg/dL    Alkaline Phosphatase 88 40 - 129 U/L    ALT 65 (H) 10 - 40 U/L    AST 61 (H) 15 - 37 U/L   Troponin   Result Value Ref Range    Troponin 0.70 (HH) <0.01 ng/mL   Blood Gas, Venous   Result Value Ref Range    pH, Daniel 7.410 7.350 - 7.450    pCO2, Daniel 56.8 (H) 40.0 - 50.0 mmHg    pO2, Daniel <30 Not Established mmHg    HCO3, Venous 36 (H) 23 - 29 mmol/L    Base Excess, Daniel 9.8 Not Established mmol/L    O2 Sat, Daniel 37 Not Established %    Carboxyhemoglobin 1.2 %    MetHgb, Daniel 0.4 <1.5 %    TC02 (Calc), Daniel 38 Not Established mmol/L    O2 Therapy Unknown    Lactate, Sepsis   Result Value Ref Range    Lactic Acid, Sepsis 1.5 0.4 - 1.9 mmol/L   Lactate, Sepsis   Result Value Ref Range    Lactic Acid, Sepsis 1.5 0.4 - 1.9 mmol/L   Brain Natriuretic Peptide   Result Value Ref Range    Pro-BNP 11,082 (H) 0 - 449 pg/mL   APTT   Result Value Ref Range    aPTT 35.6 (H) 23.0 - 34.3 sec       I have reviewed all labs for this visit. of incidentally detected pulmonary nodules: Multiple Solid Nodules: Nodule size less than 6 mm In a low-risk patient, no routine follow-up. In a high-risk patient, optional CT at 12 months. Nodule size equals 6-8 mm In a low-risk patient, CT at 3-6 months, then consider CT at 18-24 months. In a high-risk patient, CT at 3-6 months, then CT at 18-24 months. Nodule size greater than 8 mm In a low-risk patient, CT at 3-6 months, then consider CT at 18-24 months. In a high-risk patient, CT at 3-6 months, then CT at 18-24 months. - Low risk patients include individuals with minimal or absent history of smoking and other known risk factors. - High risk patients include individuals with a history or smoking or known risk factors. Radiology 2017 http://pubs. rsna.org/doi/full/10.1148/radiol. 6363464946     XR CHEST PORTABLE    Result Date: 7/18/2022  EXAMINATION: ONE XRAY VIEW OF THE CHEST 7/18/2022 5:40 pm COMPARISON: July 8, 2022 HISTORY: ORDERING SYSTEM PROVIDED HISTORY: SOB TECHNOLOGIST PROVIDED HISTORY: Reason for exam:->SOB Reason for Exam: sob FINDINGS: Improved aeration of the right mid to upper chest and lower chest compared to prior study. Background of emphysema. Persistent opacity and possible cavitary lesion or bullous emphysema or bronchiectasis in the right upper chest.  Delete correlate with CT if indicated. No pneumothorax. No pleural effusion. Improved aeration of the right chest with persistent consolidation in the mid to upper right chest.     XR CHEST PORTABLE    Result Date: 7/8/2022  EXAMINATION: ONE XRAY VIEW OF THE CHEST 7/8/2022 9:20 am COMPARISON: Chest x-ray dated 07/05/2022. HISTORY: ORDERING SYSTEM PROVIDED HISTORY: RUL pneumonia, evalu for effusion etc TECHNOLOGIST PROVIDED HISTORY: Reason for exam:->RUL pneumonia, evalu for effusion etc Reason for Exam: RUL pneumonia, evalu for effusion etc FINDINGS: HEART/MEDIASTINUM: The cardiomediastinal silhouette is unchanged.  PLEURA/LUNGS: Airspace disease noted diffusely throughout the right lung with worsening airspace disease within the right lower lobe. The left lung is clear. There is no appreciable pneumothorax. BONES/SOFT TISSUE: The visualized osseous structures are unchanged. Airspace disease noted diffusely throughout the right lung with worsening airspace disease within the right lower lobe. No definitive pleural effusion. XR CHEST PORTABLE    Result Date: 7/6/2022  EXAMINATION: ONE XRAY VIEW OF THE CHEST 7/5/2022 4:54 am COMPARISON: 07/03/2022 HISTORY: ORDERING SYSTEM PROVIDED HISTORY: hypoxia TECHNOLOGIST PROVIDED HISTORY: Reason for exam:->hypoxia Reason for Exam: hypoxia FINDINGS: Severe right upper lobe airspace disease has increased since the previous exam.  There has been no other change. The heart size is unchanged. The bones are demineralized. Severe right upper lobe pneumonia, increased since the prior exam.     XR CHEST PORTABLE    Result Date: 7/3/2022  EXAMINATION: ONE XRAY VIEW OF THE CHEST 7/3/2022 3:11 pm COMPARISON: 02/03/2020 HISTORY: ORDERING SYSTEM PROVIDED HISTORY: SOB, hypoxia TECHNOLOGIST PROVIDED HISTORY: Reason for exam:->SOB, hypoxia Reason for Exam: SOB; hypoxia FINDINGS: Asymmetric heterogeneous opacity is seen throughout the right lung, greatest within the mid lung. Left lung is clear. No significant pleural effusion. No pneumothorax. Cardiac and mediastinal silhouettes are reflective of patient rotation. Diffuse asymmetric right-sided airspace disease, greatest in the perihilar region, which may reflect pneumonia or asymmetric pulmonary edema. ED COURSE/MDM  Patient seen and evaluated. At presentation, patient was awake and alert, on 6 L nasal cannula O2 satting greater than 90%. No Significant lower extremity edema present. Differential diagnosis includes ACS, arrhythmia, PE, pneumonia, others. EKG shows T wave inversions on V2 through V5. Troponin is elevated at 0.7.   Given this, cardiology was consulted. I spoke with Dr. Shmuel Forde with cardiology who reviewed ekg and recommends giving heparin. No acute intervention at this time. Creatinine is normal.  Lactate normal at 1.5. Chest x-ray shows improved aeration of the right chest with persistent consolidation in the mid to upper right chest.  However, appears improved compared to prior chest x-ray at the last admission. Infectious work-up including blood cultures obtained. She was given cefepime and azithromycin for empiric antibiotics. Heparin was ordered for the patient. She was updated on the results of the work-up. Patient appears stable on reassessment, she remained stable on 6 L nasal cannula O2. She was ordered DuoNebs. hospitalist consulted for admission for further evaluation and treatment. Admit. Is this patient to be included in the SEP-1 Core Measure due to severe sepsis or septic shock? No   Exclusion criteria - the patient is NOT to be included for SEP-1 Core Measure due to:  May have criteria for sepsis, but does not meet criteria for severe sepsis or septic shock     I provided at least 30 minutes of critical care excluding separately billable procedures. Pt was seen during the [place] 19 pandemic. Appropriate PPE worn by ME during patient encounters. Patient was cared for during a time with constrained hospital bed capacity with nationwide stress on resources and staffing.       During the patient's ED course, the patient was given:  Medications   heparin 25,000 unit in sodium chloride 0.45% 250 mL (premix) infusion (740 Units/hr IntraVENous New Bag 7/18/22 1902)   azithromycin (ZITHROMAX) 500 mg in D5W 250ml addavial (has no administration in time range)   aspirin chewable tablet 324 mg (324 mg Oral Given 7/18/22 1811)   heparin (porcine) injection 3,700 Units (3,700 Units IntraVENous Given 7/18/22 1858)   cefepime (MAXIPIME) 2000 mg IVPB minibag (2,000 mg IntraVENous New Bag 7/18/22 1906)   ipratropium-albuterol (DUONEB) nebulizer solution 3 ampule (3 ampules Inhalation Given 7/18/22 1937)        CLINICAL IMPRESSION  1. NSTEMI (non-ST elevated myocardial infarction) ([facility] [address]    2. Pneumonia of right lung due to infectious organism, unspecified part of lung    3. Hypoxia        Blood pressure (!) 109/52, pulse 99, temperature 99 °F (37.2 °C), temperature source Oral, resp. rate 22, height 5' 4\" (1.626 m), weight 135 lb 9.3 oz (61.5 kg), SpO2 100 %, not currently breastfeeding. DISPOSITION  Mary Jane Reddy was admitted to the hospital.      Patient was given scripts for the following medications. I counseled patient how to take these medications. New Prescriptions    No medications on file       Follow-up with:  No follow-up provider specified. DISCLAIMER: This chart was created using Dragon dictation software. Efforts were made by me to ensure accuracy, however some errors may be present due to limitations of this technology and occasionally words are not transcribed correctly.        Lorayne Kussmaul, MD  07/18/22 2042

## 2022-07-19 LAB
ANION GAP SERPL CALCULATED.3IONS-SCNC: 6 MMOL/L (ref 3–16)
APTT: 43.3 SEC (ref 23–34.3)
APTT: 61.5 SEC (ref 23–34.3)
BASOPHILS ABSOLUTE: 0 K/UL (ref 0–0.2)
BASOPHILS RELATIVE PERCENT: 0.2 %
BUN BLDV-MCNC: 14 MG/DL (ref 7–20)
CALCIUM SERPL-MCNC: 8.2 MG/DL (ref 8.3–10.6)
CHLORIDE BLD-SCNC: 96 MMOL/L (ref 99–110)
CO2: 34 MMOL/L (ref 21–32)
CREAT SERPL-MCNC: 0.8 MG/DL (ref 0.6–1.2)
EKG ATRIAL RATE: 96 BPM
EKG DIAGNOSIS: NORMAL
EKG P AXIS: 48 DEGREES
EKG P-R INTERVAL: 136 MS
EKG Q-T INTERVAL: 386 MS
EKG QRS DURATION: 80 MS
EKG QTC CALCULATION (BAZETT): 487 MS
EKG R AXIS: 27 DEGREES
EKG T AXIS: 49 DEGREES
EKG VENTRICULAR RATE: 96 BPM
EOSINOPHILS ABSOLUTE: 0.2 K/UL (ref 0–0.6)
EOSINOPHILS RELATIVE PERCENT: 2.5 %
GFR AFRICAN AMERICAN: >60
GFR NON-AFRICAN AMERICAN: >60
GLUCOSE BLD-MCNC: 106 MG/DL (ref 70–99)
GLUCOSE BLD-MCNC: 107 MG/DL (ref 70–99)
GLUCOSE BLD-MCNC: 108 MG/DL (ref 70–99)
GLUCOSE BLD-MCNC: 110 MG/DL (ref 70–99)
GLUCOSE BLD-MCNC: 127 MG/DL (ref 70–99)
HCT VFR BLD CALC: 28.3 % (ref 36–48)
HEMOGLOBIN: 9.5 G/DL (ref 12–16)
LV EF: 58 %
LVEF MODALITY: NORMAL
LYMPHOCYTES ABSOLUTE: 0.6 K/UL (ref 1–5.1)
LYMPHOCYTES RELATIVE PERCENT: 7.9 %
MCH RBC QN AUTO: 31.7 PG (ref 26–34)
MCHC RBC AUTO-ENTMCNC: 33.7 G/DL (ref 31–36)
MCV RBC AUTO: 94.2 FL (ref 80–100)
MONOCYTES ABSOLUTE: 0.5 K/UL (ref 0–1.3)
MONOCYTES RELATIVE PERCENT: 6 %
NEUTROPHILS ABSOLUTE: 6.7 K/UL (ref 1.7–7.7)
NEUTROPHILS RELATIVE PERCENT: 83.4 %
PDW BLD-RTO: 14.2 % (ref 12.4–15.4)
PERFORMED ON: ABNORMAL
PLATELET # BLD: 367 K/UL (ref 135–450)
PMV BLD AUTO: 7.1 FL (ref 5–10.5)
POTASSIUM REFLEX MAGNESIUM: 4.1 MMOL/L (ref 3.5–5.1)
RAPID INFLUENZA  B AGN: NEGATIVE
RAPID INFLUENZA A AGN: NEGATIVE
RBC # BLD: 3.01 M/UL (ref 4–5.2)
SODIUM BLD-SCNC: 136 MMOL/L (ref 136–145)
WBC # BLD: 8.1 K/UL (ref 4–11)

## 2022-07-19 PROCEDURE — 85730 THROMBOPLASTIN TIME PARTIAL: CPT

## 2022-07-19 PROCEDURE — 36415 COLL VENOUS BLD VENIPUNCTURE: CPT

## 2022-07-19 PROCEDURE — 6370000000 HC RX 637 (ALT 250 FOR IP)

## 2022-07-19 PROCEDURE — 93306 TTE W/DOPPLER COMPLETE: CPT

## 2022-07-19 PROCEDURE — 87077 CULTURE AEROBIC IDENTIFY: CPT

## 2022-07-19 PROCEDURE — 2500000003 HC RX 250 WO HCPCS

## 2022-07-19 PROCEDURE — 80048 BASIC METABOLIC PNL TOTAL CA: CPT

## 2022-07-19 PROCEDURE — 87804 INFLUENZA ASSAY W/OPTIC: CPT

## 2022-07-19 PROCEDURE — 6370000000 HC RX 637 (ALT 250 FOR IP): Performed by: INTERNAL MEDICINE

## 2022-07-19 PROCEDURE — B2111ZZ FLUOROSCOPY OF MULTIPLE CORONARY ARTERIES USING LOW OSMOLAR CONTRAST: ICD-10-PCS | Performed by: INTERNAL MEDICINE

## 2022-07-19 PROCEDURE — 2500000003 HC RX 250 WO HCPCS: Performed by: INTERNAL MEDICINE

## 2022-07-19 PROCEDURE — 6360000002 HC RX W HCPCS: Performed by: INTERNAL MEDICINE

## 2022-07-19 PROCEDURE — 93458 L HRT ARTERY/VENTRICLE ANGIO: CPT | Performed by: INTERNAL MEDICINE

## 2022-07-19 PROCEDURE — 6360000002 HC RX W HCPCS: Performed by: EMERGENCY MEDICINE

## 2022-07-19 PROCEDURE — 99152 MOD SED SAME PHYS/QHP 5/>YRS: CPT | Performed by: INTERNAL MEDICINE

## 2022-07-19 PROCEDURE — 94640 AIRWAY INHALATION TREATMENT: CPT

## 2022-07-19 PROCEDURE — 99152 MOD SED SAME PHYS/QHP 5/>YRS: CPT

## 2022-07-19 PROCEDURE — 94760 N-INVAS EAR/PLS OXIMETRY 1: CPT

## 2022-07-19 PROCEDURE — 87070 CULTURE OTHR SPECIMN AEROBIC: CPT

## 2022-07-19 PROCEDURE — 6360000002 HC RX W HCPCS

## 2022-07-19 PROCEDURE — 2700000000 HC OXYGEN THERAPY PER DAY

## 2022-07-19 PROCEDURE — C1894 INTRO/SHEATH, NON-LASER: HCPCS

## 2022-07-19 PROCEDURE — 87181 SC STD AGAR DILUTION PER AGT: CPT

## 2022-07-19 PROCEDURE — 93010 ELECTROCARDIOGRAM REPORT: CPT | Performed by: INTERNAL MEDICINE

## 2022-07-19 PROCEDURE — 87186 SC STD MICRODIL/AGAR DIL: CPT

## 2022-07-19 PROCEDURE — 2580000003 HC RX 258: Performed by: INTERNAL MEDICINE

## 2022-07-19 PROCEDURE — 2709999900 HC NON-CHARGEABLE SUPPLY

## 2022-07-19 PROCEDURE — 87205 SMEAR GRAM STAIN: CPT

## 2022-07-19 PROCEDURE — 99153 MOD SED SAME PHYS/QHP EA: CPT

## 2022-07-19 PROCEDURE — 6360000004 HC RX CONTRAST MEDICATION: Performed by: INTERNAL MEDICINE

## 2022-07-19 PROCEDURE — 93458 L HRT ARTERY/VENTRICLE ANGIO: CPT

## 2022-07-19 PROCEDURE — B2151ZZ FLUOROSCOPY OF LEFT HEART USING LOW OSMOLAR CONTRAST: ICD-10-PCS | Performed by: INTERNAL MEDICINE

## 2022-07-19 PROCEDURE — 85025 COMPLETE CBC W/AUTO DIFF WBC: CPT

## 2022-07-19 PROCEDURE — 4A023N7 MEASUREMENT OF CARDIAC SAMPLING AND PRESSURE, LEFT HEART, PERCUTANEOUS APPROACH: ICD-10-PCS | Performed by: INTERNAL MEDICINE

## 2022-07-19 PROCEDURE — 1200000000 HC SEMI PRIVATE

## 2022-07-19 PROCEDURE — C1887 CATHETER, GUIDING: HCPCS

## 2022-07-19 PROCEDURE — C1769 GUIDE WIRE: HCPCS

## 2022-07-19 RX ORDER — HYDROCODONE BITARTRATE AND ACETAMINOPHEN 5; 325 MG/1; MG/1
1 TABLET ORAL EVERY 6 HOURS PRN
Status: DISCONTINUED | OUTPATIENT
Start: 2022-07-19 | End: 2022-08-03

## 2022-07-19 RX ORDER — HEPARIN SODIUM 1000 [USP'U]/ML
1850 INJECTION, SOLUTION INTRAVENOUS; SUBCUTANEOUS ONCE
Status: COMPLETED | OUTPATIENT
Start: 2022-07-19 | End: 2022-07-19

## 2022-07-19 RX ORDER — SODIUM CHLORIDE 9 MG/ML
INJECTION, SOLUTION INTRAVENOUS PRN
Status: DISCONTINUED | OUTPATIENT
Start: 2022-07-19 | End: 2022-07-22 | Stop reason: SDUPTHER

## 2022-07-19 RX ORDER — SODIUM CHLORIDE 0.9 % (FLUSH) 0.9 %
5-40 SYRINGE (ML) INJECTION PRN
Status: DISCONTINUED | OUTPATIENT
Start: 2022-07-19 | End: 2022-07-22 | Stop reason: SDUPTHER

## 2022-07-19 RX ORDER — ACETAMINOPHEN 325 MG/1
650 TABLET ORAL EVERY 4 HOURS PRN
Status: DISCONTINUED | OUTPATIENT
Start: 2022-07-19 | End: 2022-07-27 | Stop reason: SDUPTHER

## 2022-07-19 RX ORDER — ATORVASTATIN CALCIUM 40 MG/1
40 TABLET, FILM COATED ORAL NIGHTLY
Status: DISCONTINUED | OUTPATIENT
Start: 2022-07-19 | End: 2022-08-16 | Stop reason: HOSPADM

## 2022-07-19 RX ORDER — HEPARIN SODIUM 1000 [USP'U]/ML
3700 INJECTION, SOLUTION INTRAVENOUS; SUBCUTANEOUS ONCE
Status: COMPLETED | OUTPATIENT
Start: 2022-07-19 | End: 2022-07-19

## 2022-07-19 RX ORDER — SODIUM CHLORIDE 0.9 % (FLUSH) 0.9 %
5-40 SYRINGE (ML) INJECTION EVERY 12 HOURS SCHEDULED
Status: DISCONTINUED | OUTPATIENT
Start: 2022-07-19 | End: 2022-07-22 | Stop reason: SDUPTHER

## 2022-07-19 RX ADMIN — DULOXETINE HYDROCHLORIDE 60 MG: 60 CAPSULE, DELAYED RELEASE ORAL at 20:33

## 2022-07-19 RX ADMIN — GABAPENTIN 600 MG: 300 CAPSULE ORAL at 08:51

## 2022-07-19 RX ADMIN — ACETAMINOPHEN 650 MG: 325 TABLET ORAL at 08:51

## 2022-07-19 RX ADMIN — HYDROCODONE BITARTRATE AND ACETAMINOPHEN 1 TABLET: 5; 325 TABLET ORAL at 12:24

## 2022-07-19 RX ADMIN — IOPAMIDOL 60 ML: 755 INJECTION, SOLUTION INTRAVENOUS at 16:37

## 2022-07-19 RX ADMIN — SODIUM CHLORIDE, PRESERVATIVE FREE 10 ML: 5 INJECTION INTRAVENOUS at 09:32

## 2022-07-19 RX ADMIN — CEFEPIME 2000 MG: 2 INJECTION, POWDER, FOR SOLUTION INTRAVENOUS at 06:06

## 2022-07-19 RX ADMIN — AZITHROMYCIN MONOHYDRATE 500 MG: 500 INJECTION, POWDER, LYOPHILIZED, FOR SOLUTION INTRAVENOUS at 08:56

## 2022-07-19 RX ADMIN — MOMETASONE FUROATE AND FORMOTEROL FUMARATE DIHYDRATE 2 PUFF: 200; 5 AEROSOL RESPIRATORY (INHALATION) at 09:57

## 2022-07-19 RX ADMIN — TIOTROPIUM BROMIDE INHALATION SPRAY 2 PUFF: 3.12 SPRAY, METERED RESPIRATORY (INHALATION) at 09:57

## 2022-07-19 RX ADMIN — HEPARIN SODIUM 1110 UNITS/HR: 10000 INJECTION, SOLUTION INTRAVENOUS at 11:04

## 2022-07-19 RX ADMIN — CEFEPIME 2000 MG: 2 INJECTION, POWDER, FOR SOLUTION INTRAVENOUS at 18:47

## 2022-07-19 RX ADMIN — HEPARIN SODIUM 3700 UNITS: 1000 INJECTION INTRAVENOUS; SUBCUTANEOUS at 03:42

## 2022-07-19 RX ADMIN — ATORVASTATIN CALCIUM 40 MG: 40 TABLET, FILM COATED ORAL at 20:33

## 2022-07-19 RX ADMIN — HYDROCODONE BITARTRATE AND ACETAMINOPHEN 1 TABLET: 5; 325 TABLET ORAL at 20:37

## 2022-07-19 RX ADMIN — TIZANIDINE 4 MG: 4 TABLET ORAL at 20:33

## 2022-07-19 RX ADMIN — GABAPENTIN 600 MG: 300 CAPSULE ORAL at 20:33

## 2022-07-19 RX ADMIN — PANTOPRAZOLE SODIUM 40 MG: 40 TABLET, DELAYED RELEASE ORAL at 08:51

## 2022-07-19 RX ADMIN — SODIUM CHLORIDE, PRESERVATIVE FREE 10 ML: 5 INJECTION INTRAVENOUS at 20:33

## 2022-07-19 RX ADMIN — HEPARIN SODIUM 1850 UNITS: 1000 INJECTION INTRAVENOUS; SUBCUTANEOUS at 11:02

## 2022-07-19 RX ADMIN — MOMETASONE FUROATE AND FORMOTEROL FUMARATE DIHYDRATE 2 PUFF: 200; 5 AEROSOL RESPIRATORY (INHALATION) at 20:47

## 2022-07-19 RX ADMIN — DULOXETINE HYDROCHLORIDE 60 MG: 60 CAPSULE, DELAYED RELEASE ORAL at 08:51

## 2022-07-19 ASSESSMENT — PAIN DESCRIPTION - LOCATION
LOCATION: BACK
LOCATION: BUTTOCKS

## 2022-07-19 ASSESSMENT — PAIN - FUNCTIONAL ASSESSMENT: PAIN_FUNCTIONAL_ASSESSMENT: PREVENTS OR INTERFERES SOME ACTIVE ACTIVITIES AND ADLS

## 2022-07-19 ASSESSMENT — PAIN SCALES - GENERAL
PAINLEVEL_OUTOF10: 9
PAINLEVEL_OUTOF10: 10
PAINLEVEL_OUTOF10: 0
PAINLEVEL_OUTOF10: 9

## 2022-07-19 ASSESSMENT — PAIN DESCRIPTION - PAIN TYPE: TYPE: CHRONIC PAIN

## 2022-07-19 ASSESSMENT — PAIN DESCRIPTION - FREQUENCY: FREQUENCY: CONTINUOUS

## 2022-07-19 ASSESSMENT — PAIN DESCRIPTION - DESCRIPTORS: DESCRIPTORS: ACHING

## 2022-07-19 ASSESSMENT — PAIN DESCRIPTION - ORIENTATION: ORIENTATION: LOWER

## 2022-07-19 ASSESSMENT — PAIN DESCRIPTION - ONSET: ONSET: ON-GOING

## 2022-07-19 NOTE — H&P
Hospital Medicine  History and Physical    PCP: HECTOR Arteaga CNP  Patient Name: Yanique Meyer    Date of Service: Pt seen/examined on 7/18/22 and admitted to Inpatient with expected LOS greater than two midnights due to medical therapy. CHIEF COMPLAINT:  Pt c/o shortness of breath, increased oxygen demand  HISTORY OF PRESENT ILLNESS: Pt is an 78y.o. year-old female with a history of hyperlipidemia, fibromyalgia, chronic pain syndrome, severe COPD with chronic hypoxic respiratory failure requiring oxygen at 4 L/min via nasal cannula continuously. She presents to the emergency room for evaluation following a 2 to 3-day history of worsening shortness of breath. She was recently treated for Pseudomonas pneumonia and discharged to rehab. EMS reports that she was on a very long oxygen tube when they arrived. She was placed on 6 L nasal cannula and her oxygen saturation improved. In the emergency room she was found to have an abnormal EKG with inverted T waves in V2 through V5. Cardiology was consulted and asked that she be put on a heparin drip. Patient denies any current or recent chest pain. She is being admitted for further evaluation and treatment. Associated signs and symptoms do not include chest pain, lightheaded, dizziness, diaphoresis, edema, orthopnea, paroxysmal nocturnal dyspnea, fever or chills. No recent medication changes.              Past Medical History:        Diagnosis Date    Chronic pain syndrome     Colorectal polyps     COPD (chronic obstructive pulmonary disease) (HCC)     CTS (carpal tunnel syndrome)     BILATERAL    DDD (degenerative disc disease), lumbar     Depression     Family hx of colon cancer     Fibromyalgia     Hyperlipemia     IBS (irritable bowel syndrome)     Lumbar spondylosis     New onset type 2 diabetes mellitus ([facility] [address] 2/3/2020    Urticaria, chronic        Past Surgical History:        Procedure Laterality Date    CARPAL TUNNEL RELEASE Bilateral CERVICAL POLYP REMOVAL  9/2004    INTRACAPSULAR CATARACT EXTRACTION Left 6/23/2020    Phacoemulsification with intraocular lens implant performed by Armen Denver, MD at [address] Right 7/14/2020    Phacoemulsification with intraocular lens implant performed by Armen Denver, MD at [address]      patient denies        Allergies:  Patient has no known allergies. Medications Prior to Admission:    Prior to Admission medications    Medication Sig Start Date End Date Taking? Authorizing Provider   gabapentin (NEURONTIN) 600 MG tablet Take 1 tablet by mouth 2 times daily for 30 days. 7/13/22 8/12/22  Braulio العلي MD   oxyCODONE-acetaminophen (PERCOCET) 5-325 MG per tablet Take 1 tablet by mouth every 6 hours as needed for Pain for up to 7 days.  7/13/22 7/20/22  Braulio العلي MD   nystatin (MYCOSTATIN) 469855 UNIT/ML suspension Take 5 mLs by mouth 4 times daily 7/13/22   Braulio العلي MD   benzocaine-menthol (CEPACOL SORE THROAT) 15-3.6 MG lozenge Take 1 lozenge by mouth every 2 hours as needed for Sore Throat 7/13/22   Braulio العلي MD   DULoxetine (CYMBALTA) 60 MG extended release capsule TAKE 1 CAPSULE BY MOUTH TWICE A DAY 6/14/22   HECTOR Ruiz CNP   pantoprazole (PROTONIX) 40 MG tablet Take 1 tablet by mouth daily 6/7/22   Lupe Alvarado MD   atorvastatin (LIPITOR) 80 MG tablet TAKE 1 TABLET BY MOUTH EVERY DAY FOR CHOLESTEROL 6/3/22   HECTOR Ruiz CNP   tiZANidine (ZANAFLEX) 4 MG tablet Take 1 tablet by mouth nightly 5/10/22   Lupe Alvarado MD   meloxicam (MOBIC) 15 MG tablet Take 1 tablet by mouth daily 5/10/22   Lupe Alvarado MD   traZODone (DESYREL) 50 MG tablet Take 1-2 tablets by mouth nightly 4/12/22   Lupe Alvarado MD   Fluticasone-Umeclidin-Vilant (TRELEGY ELLIPTA) 200-62.5-25 MCG/INH AEPB Inhale 1 Inhaler into the lungs daily 4/5/22   HECTOR Jimenez CNP   magnesium oxide (MGO) 400 (241.3 Mg) MG TABS tablet Take 1 tablet by mouth 2 times daily as needed (for cramping) 3/15/22   Joe Chavira MD   alendronate (FOSAMAX) 70 MG tablet TAKE 1 TABLET BY MOUTH ONE TIME PER WEEK 3/8/22   Charu Fernando, APRN - CNP   albuterol sulfate  (90 Base) MCG/ACT inhaler Inhale 2 puffs into the lungs every 6 hours as needed for Shortness of Breath 12/29/21   Karrie Lynch MD   Calcium Citrate-Vitamin D 500-500 MG-UNIT CHEW Take 1 tablet by mouth 2 times daily 9/27/21   Charu Fernando, APRN - CNP   ONE TOUCH LANCETS MISC 1 each by Does not apply route daily 2/7/20   Charu Fernando, APRN - CNP   ipratropium-albuterol (DUONEB) 0.5-2.5 (3) MG/3ML SOLN nebulizer solution Take 1 aerosol every 4 hours for 2 days and then as needed for shortness of breath coughing and wheezing 9/4/19   Angie Schaffer MD   Cholecalciferol (VITAMIN D) 2000 units TABS tablet Take 1 tablet by mouth daily 7/12/19   Charu Fernando, APRN - CNP   cetirizine (ZYRTEC) 10 MG tablet Take 10 mg by mouth daily as needed for Allergies    Historical Provider, MD   Nebulizers (COMPRESSOR/NEBULIZER) MISC 1 Units by Does not apply route 4 times daily 3/30/19   Annmarie Jarvis MD       Family History:       Problem Relation Age of Onset    Cancer Father         COLON     Alzheimer's Disease Mother     Heart Disease Brother     Heart Failure Brother     Diabetes Daughter     Diabetes Daughter     Diabetes Daughter      Social History:   TOBACCO:   reports that she has been smoking cigarettes. She started smoking about 60 years ago. She has a 55.00 pack-year smoking history. She has never used smokeless tobacco.  ETOH:   reports no history of alcohol use.   OCCUPATION:      REVIEW OF SYSTEMS:  A full review of systems was performed and is negative except for that which appears in the HPI    Physical Exam:    Vitals: BP (!) 111/46   Pulse (!) 102   Temp 99 °F (37.2 °C) (Oral)   Resp 22   Ht 5' 4\" (1.626 m) Wt 135 lb 9.3 oz (61.5 kg)   SpO2 95%   BMI 23.27 kg/m²   General appearance: WD/WN 78y.o. year-old female who is alert, appears stated age and is cooperative  HEENT: Head: Normocephalic, no lesions, without obvious abnormality. Eye: Normal external eye, conjunctiva, lids cornea, PEERL. Ears: Normal external ears. Non-tender. Nose: Normal external nose, mucus membranes and septum. Pharynx: Dental Hygiene adequate. Normal buccal mucosa. Normal pharynx. Neck: no adenopathy, no carotid bruit, no JVD, supple, symmetrical, trachea midline and thyroid not enlarged, symmetric, no tenderness/mass/nodules  Lungs: clear to auscultation bilaterally and no use of accessory muscles  Heart: regular rate and rhythm, S1, S2 normal, no murmur, click, rub or gallop and normal apical impulse  Abdomen: soft, non-tender; bowel sounds normal; no masses, no organomegaly  Extremities: extremities atraumatic, no cyanosis or edema and Homans sign is negative, no sign of DVT. Capillary Refill: Acceptable < 3 seconds   Peripheral Pulses: +3 easily felt, not easily obliterated with pressures   Skin: Skin color, texture, turgor normal. No rashes or lesions on exposed skin  Neurologic: Neurovascularly intact without any focal sensory/motor deficits. Cranial nerves: II-XII intact, grossly non-focal. Gait was not tested.   Mental Status: Alert and oriented, thought content appropriate, normal insight        CBC:   Recent Labs     07/18/22  1720   WBC 8.3   HGB 10.8*        BMP:    Recent Labs     07/18/22  1720   *   K 3.9   CL 95*   CO2 31   BUN 20   CREATININE 1.0   GLUCOSE 145*     Troponin:   Recent Labs     07/18/22  1720   TROPONINI 0.70*     PT/INR:  No results found for: PTINR  U/A:    Lab Results   Component Value Date/Time    LEUKOCYTESUR TRACE 07/03/2022 03:11 PM    NITRITE neg 10/17/2019 11:50 AM    RBCUA 0-2 07/03/2022 03:11 PM    SPECGRAV 1.022 07/03/2022 03:11 PM    UROBILINOGEN 1.0 07/03/2022 03:11 PM significant change pulmonary nodules as discussed above. RECOMMENDATIONS: Fleischner Society guidelines for follow-up and management of incidentally detected pulmonary nodules: Multiple Solid Nodules: Nodule size less than 6 mm In a low-risk patient, no routine follow-up. In a high-risk patient, optional CT at 12 months. Nodule size equals 6-8 mm In a low-risk patient, CT at 3-6 months, then consider CT at 18-24 months. In a high-risk patient, CT at 3-6 months, then CT at 18-24 months. Nodule size greater than 8 mm In a low-risk patient, CT at 3-6 months, then consider CT at 18-24 months. In a high-risk patient, CT at 3-6 months, then CT at 18-24 months. - Low risk patients include individuals with minimal or absent history of smoking and other known risk factors. - High risk patients include individuals with a history or smoking or known risk factors. Radiology 2017 http://pubs. rsna.org/doi/full/10.1148/radiol. 2074961774     XR CHEST PORTABLE    Result Date: 7/18/2022  EXAMINATION: ONE XRAY VIEW OF THE CHEST 7/18/2022 5:40 pm COMPARISON: July 8, 2022 HISTORY: ORDERING SYSTEM PROVIDED HISTORY: SOB TECHNOLOGIST PROVIDED HISTORY: Reason for exam:->SOB Reason for Exam: sob FINDINGS: Improved aeration of the right mid to upper chest and lower chest compared to prior study. Background of emphysema. Persistent opacity and possible cavitary lesion or bullous emphysema or bronchiectasis in the right upper chest.  Delete correlate with CT if indicated. No pneumothorax. No pleural effusion. Improved aeration of the right chest with persistent consolidation in the mid to upper right chest.     XR CHEST PORTABLE    Result Date: 7/8/2022  EXAMINATION: ONE XRAY VIEW OF THE CHEST 7/8/2022 9:20 am COMPARISON: Chest x-ray dated 07/05/2022.  HISTORY: ORDERING SYSTEM PROVIDED HISTORY: RUL pneumonia, evalu for effusion etc TECHNOLOGIST PROVIDED HISTORY: Reason for exam:->RUL pneumonia, evalu for effusion etc Reason for Exam: RUL ischemia         Assessment:   Principal Problem:    Acute on chronic respiratory failure with hypoxia and hypercapnia (HCC)  Active Problems:    Chronic obstructive pulmonary disease (HCC)    Abnormal EKG    Fibromyalgia    Hypercholesteremia    Chronic pain syndrome    COPD, severe (HCC)  Resolved Problems:    * No resolved hospital problems. *      Plan:       Acute on chronic respiratory failure with hypoxia and hypercapnia (HCC) -the etiology of the acute aspect of her respiratory failure is unclear at this time. There is some concern that her Pseudomonas pneumonia was not fully treated. Chest x-ray does show some Consolidation in the right lung. She has been placed on broad-spectrum antibiotics and we will check a procalcitonin level.  -We will provide oxygen as necessary to maintain an oxygen saturation of 92% or higher     Abnormal EKG -she denies current or recent chest pain. She has inverted T waves in V2 through V5. Cardiology was consulted and recommended a heparin drip. They will see her in the morning. COPD, severe ([address] - without acute exacerbation. Will provide Nebulizer treatments as needed and continue home medications. Patient will be monitored closely, and deep breathing and coughing will be encouraged while awake. Hyperlipidemia - No current evidence of Rhabdomyolysis or other adverse effects. Continue statin therapy while in the hospital    Chronic pain syndrome -continue her home pain med regimen    Fibromyalgia - provide supportive care             Code Status: Full Code  Diet: ADULT DIET; Regular; 5 carb choices (75 gm/meal)  DVT Prophylaxis: Heparin    (Advanced care planning has been discussed with patient and/or responsible family member and is reflected in the code status.  Further orders associated with this have been entered if appropriate)    Disposition: Anticipate that patient will remain in the hospital for 2 or more midnights depending on further evaluation and clinical course     Please note that over 50 minutes was spent in evaluating the patient, review of records and results, discussion with staff/family, etc.      Fransisca Doan MD

## 2022-07-19 NOTE — PROGRESS NOTES
Hospitalist Progress Note      PCP: Michoacano Dahl APRN - CNP    Date of Admission: 7/18/2022    Chief Complaint:   Chief Complaint   Patient presents with    Shortness of Breath     At [facility] for rehab for pneumonia increased sob over the last few days. Pt on 4-5 lpm via nc all the time. Per EMS the cord was very long ems placed on 6lpm via nc and o2 sat came up to 99%         Hospital Course: Pt is an 78y.o. year-old female with a history of hyperlipidemia, fibromyalgia, chronic pain syndrome, severe COPD with chronic hypoxic respiratory failure requiring oxygen at 4 L/min via nasal cannula continuously. She presents to the emergency room for evaluation following a 2 to 3-day history of worsening shortness of breath. She was recently treated for Pseudomonas pneumonia and discharged to rehab. EMS reports that she was on a very long oxygen tube when they arrived. She was placed on 6 L nasal cannula and her oxygen saturation improved. In the emergency room she was found to have an abnormal EKG with inverted T waves in V2 through V5. Cardiology was consulted and asked that she be put on a heparin drip. Patient denies any current or recent chest pain. She is being admitted for further evaluation and treatment. Associated signs and symptoms do not include chest pain, lightheaded, dizziness, diaphoresis, edema, orthopnea, paroxysmal nocturnal dyspnea, fever or chills. No recent medication changes.      7/19  Feeling better, supplemental O2 5 L    Subjective: as above      Medications:  Reviewed    Infusion Medications    heparin (PORCINE) Infusion 1,110 Units/hr (07/19/22 1104)    dextrose      sodium chloride       Scheduled Medications    atorvastatin  40 mg Oral Nightly    tiZANidine  4 mg Oral Nightly    pantoprazole  40 mg Oral Daily    gabapentin  600 mg Oral BID    DULoxetine  60 mg Oral BID    insulin lispro  0-12 Units SubCUTAneous TID     insulin lispro  0-6 Units SubCUTAneous Nightly sodium chloride flush  10 mL IntraVENous 2 times per day    cefepime  2,000 mg IntraVENous Q12H    azithromycin  500 mg IntraVENous Q24H    mometasone-formoterol  2 puff Inhalation BID    And    tiotropium  2 puff Inhalation Daily     PRN Meds: HYDROcodone 5 mg - acetaminophen, perflutren lipid microspheres, perflutren lipid microspheres, traZODone, ipratropium-albuterol, cetirizine, albuterol sulfate HFA, glucose, dextrose bolus **OR** dextrose bolus, glucagon (rDNA), dextrose, sodium chloride flush, sodium chloride, ondansetron, polyethylene glycol, acetaminophen **OR** acetaminophen      Intake/Output Summary (Last 24 hours) at 7/19/2022 1448  Last data filed at 7/19/2022 5231  Gross per 24 hour   Intake 59 ml   Output --   Net 59 ml       Physical Exam Performed:    /66   Pulse 90   Temp 98.6 °F (37 °C) (Oral)   Resp 20   Ht 5' 4\" (1.626 m)   Wt 136 lb 11 oz (62 kg)   SpO2 93%   BMI 23.46 kg/m²     General appearance: No apparent distress, appears stated age and cooperative. HEENT: Pupils equal, round, and reactive to light. Conjunctivae/corneas clear. Neck: Supple, with full range of motion. No jugular venous distention. Trachea midline. Respiratory:  diffuse crackles and wheezing   Cardiovascular: Regular rate and rhythm with normal S1/S2 without murmurs, rubs or gallops. Abdomen: Soft, non-tender, non-distended with normal bowel sounds. Musculoskeletal: No clubbing, cyanosis or edema bilaterally. Full range of motion without deformity. Skin: Skin color, texture, turgor normal.  No rashes or lesions. Neurologic:  Neurovascularly intact without any focal sensory/motor deficits.  Cranial nerves: II-XII intact, grossly non-focal.  Psychiatric: Alert and oriented, thought content appropriate, normal insight  Capillary Refill: Brisk,3 seconds, normal   Peripheral Pulses: +2 palpable, equal bilaterally       Labs:   Recent Labs     07/18/22  1720 07/19/22  0743   WBC 8.3 8.1   HGB 10.8* 9.5* HCT 31.7* 28.3*    367     Recent Labs     07/18/22  1720 07/19/22  0743   * 136   K 3.9 4.1   CL 95* 96*   CO2 31 34*   BUN 20 14   CREATININE 1.0 0.8   CALCIUM 8.8 8.2*     Recent Labs     07/18/22  1720   AST 61*   ALT 65*   BILITOT 0.5   ALKPHOS 88     No results for input(s): INR in the last 72 hours. Recent Labs     07/18/22  1720 07/18/22  2229   TROPONINI 0.70* 0.52*       Urinalysis:      Lab Results   Component Value Date/Time    NITRU Negative 07/03/2022 03:11 PM    WBCUA 3-5 07/03/2022 03:11 PM    BACTERIA 4+ 07/03/2022 03:11 PM    RBCUA 0-2 07/03/2022 03:11 PM    BLOODU Negative 07/03/2022 03:11 PM    SPECGRAV 1.022 07/03/2022 03:11 PM    GLUCOSEU Negative 07/03/2022 03:11 PM       Radiology:  XR CHEST PORTABLE   Final Result   Improved aeration of the right chest with persistent consolidation in the mid   to upper right chest.                 Assessment/Plan:    Active Hospital Problems    Diagnosis     Acute on chronic respiratory failure with hypoxia and hypercapnia (HCC) [J96.21, J96.22]      Priority: Medium    Abnormal EKG [R94.31]      Priority: Medium    Chronic obstructive pulmonary disease (HCC) [J44.9]      Priority: Medium    COPD, severe ([facility] [address] [J44.9]     Chronic pain syndrome [G89.4]     Hypercholesteremia [E78.00]     Fibromyalgia [M79.7]      Acute on chronic respiratory failure with hypoxia and hypercapnia (HCC) -the etiology of the acute aspect of her respiratory failure is unclear at this time. There is some concern that her Pseudomonas pneumonia was not fully treated. Chest x-ray does show some Consolidation in the right lung. She has been placed on broad-spectrum antibiotics and we will check a procalcitonin level.  -We will provide oxygen as necessary to maintain an oxygen saturation of 92% or higher      Abnormal EKG -she denies current or recent chest pain. She has inverted T waves in V2 through V5.   Cardiology evaluated the patient, recommending echo and heparin infusion continuation      COPD, severe ([facility] [address] - without acute exacerbation. Will provide Nebulizer treatments as needed and continue home medications. Patient will be monitored closely, and deep breathing and coughing will be encouraged while awake. Hyperlipidemia - No current evidence of Rhabdomyolysis or other adverse effects. Continue statin therapy while in the hospital     Chronic pain syndrome -continue her home pain med regimen     Fibromyalgia - provide supportive care       DVT Prophylaxis: heparin  Diet: ADULT DIET;  Regular; 5 carb choices (75 gm/meal)  Code Status: Full Code    PT/OT Eval Status: requested    Dispo - return to Wilmar Rosas MD

## 2022-07-19 NOTE — PRE SEDATION
Sedation Pre-Procedure Note    Patient Name: Ramesh العلي   YOB: 1945  Room/Bed: CATH/NONE  Medical Record Number: [id number]  Date: 7/19/2022   Time: 4:44 PM       Indication:  nstemi    Consent: I have discussed with the patient and/or the patient representative the indication, alternatives, and the possible risks and/or complications of the planned procedure and the anesthesia methods. The patient and/or patient representative appear to understand and agree to proceed. Vital Signs:   Vitals:    07/19/22 1006   BP:    Pulse: 90   Resp: 20   Temp:    SpO2: 93%       Past Medical History:   has a past medical history of Chronic pain syndrome, Colorectal polyps, COPD (chronic obstructive pulmonary disease) (HCC), CTS (carpal tunnel syndrome), DDD (degenerative disc disease), lumbar, Depression, Family hx of colon cancer, Fibromyalgia, Hyperlipemia, IBS (irritable bowel syndrome), Lumbar spondylosis, New onset type 2 diabetes mellitus ([facility] and Urticaria, chronic. Past Surgical History:   has a past surgical history that includes Tympanostomy tube placement; Cervical polyp removal (9/2004); Carpal tunnel release (Bilateral); Tubal ligation; Intracapsular cataract extraction (Left, 6/23/2020); and Intracapsular cataract extraction (Right, 7/14/2020).     Medications:   Scheduled Meds:    atorvastatin  40 mg Oral Nightly    tiZANidine  4 mg Oral Nightly    pantoprazole  40 mg Oral Daily    gabapentin  600 mg Oral BID    DULoxetine  60 mg Oral BID    insulin lispro  0-12 Units SubCUTAneous TID WC    insulin lispro  0-6 Units SubCUTAneous Nightly    sodium chloride flush  10 mL IntraVENous 2 times per day    cefepime  2,000 mg IntraVENous Q12H    azithromycin  500 mg IntraVENous Q24H    mometasone-formoterol  2 puff Inhalation BID    And    tiotropium  2 puff Inhalation Daily     Continuous Infusions:    heparin (PORCINE) Infusion 1,110 Units/hr (07/19/22 1104)    dextrose      sodium chloride PRN Meds: HYDROcodone 5 mg - acetaminophen, perflutren lipid microspheres, perflutren lipid microspheres, traZODone, ipratropium-albuterol, cetirizine, albuterol sulfate HFA, glucose, dextrose bolus **OR** dextrose bolus, glucagon (rDNA), dextrose, sodium chloride flush, sodium chloride, ondansetron, polyethylene glycol, acetaminophen **OR** acetaminophen  Home Meds:   Prior to Admission medications    Medication Sig Start Date End Date Taking? Authorizing Provider   gabapentin (NEURONTIN) 600 MG tablet Take 1 tablet by mouth 2 times daily for 30 days. 7/13/22 8/12/22  Evi Victor MD   oxyCODONE-acetaminophen (PERCOCET) 5-325 MG per tablet Take 1 tablet by mouth every 6 hours as needed for Pain for up to 7 days.  7/13/22 7/20/22  Evi Victor MD   nystatin (MYCOSTATIN) 282374 UNIT/ML suspension Take 5 mLs by mouth 4 times daily 7/13/22   Evi Victor MD   benzocaine-menthol (CEPACOL SORE THROAT) 15-3.6 MG lozenge Take 1 lozenge by mouth every 2 hours as needed for Sore Throat 7/13/22   Evi Victor MD   DULoxetine (CYMBALTA) 60 MG extended release capsule TAKE 1 CAPSULE BY MOUTH TWICE A DAY 6/14/22   HECTOR Skelton CNP   pantoprazole (PROTONIX) 40 MG tablet Take 1 tablet by mouth daily 6/7/22   Marbella Arnold MD   atorvastatin (LIPITOR) 80 MG tablet TAKE 1 TABLET BY MOUTH EVERY DAY FOR CHOLESTEROL 6/3/22   HECTOR Skelton CNP   tiZANidine (ZANAFLEX) 4 MG tablet Take 1 tablet by mouth nightly 5/10/22   Marbella Arnold MD   meloxicam (MOBIC) 15 MG tablet Take 1 tablet by mouth daily 5/10/22   Marbella Arnold MD   traZODone (DESYREL) 50 MG tablet Take 1-2 tablets by mouth nightly 4/12/22   Marbella Arnold MD   Fluticasone-Umeclidin-Vilant (TRELEGY ELLIPTA) 200-62.5-25 MCG/INH AEPB Inhale 1 Inhaler into the lungs daily 4/5/22   Ellamae Holstein, APRN - CNP   magnesium oxide (MGO) 400 (241.3 Mg) MG TABS tablet Take 1 tablet by mouth 2 times daily as needed (for cramping) 3/15/22   Marbella Arnold MD alendronate (FOSAMAX) 70 MG tablet TAKE 1 TABLET BY MOUTH ONE TIME PER WEEK 3/8/22   HECTOR Bryant CNP   albuterol sulfate  (90 Base) MCG/ACT inhaler Inhale 2 puffs into the lungs every 6 hours as needed for Shortness of Breath 12/29/21   Daniela Larkin MD   Calcium Citrate-Vitamin D 500-500 MG-UNIT CHEW Take 1 tablet by mouth 2 times daily 9/27/21   HECTOR Bryant CNP   ONE TOUCH LANCETS MISC 1 each by Does not apply route daily 2/7/20   HECTOR Bryant CNP   ipratropium-albuterol (DUONEB) 0.5-2.5 (3) MG/3ML SOLN nebulizer solution Take 1 aerosol every 4 hours for 2 days and then as needed for shortness of breath coughing and wheezing 9/4/19   Joan Cervantes MD   Cholecalciferol (VITAMIN D) 2000 units TABS tablet Take 1 tablet by mouth daily 7/12/19   HECTOR Bryant CNP   cetirizine (ZYRTEC) 10 MG tablet Take 10 mg by mouth daily as needed for Allergies    Historical Provider, MD   Nebulizers (COMPRESSOR/NEBULIZER) MISC 1 Units by Does not apply route 4 times daily 3/30/19   Yuni Berg MD     Coumadin Use Last 7 Days:  no  Antiplatelet drug therapy use last 7 days: yes -   Other anticoagulant use last 7 days: yes -   Additional Medication Information:  n/a      Pre-Sedation Documentation and Exam:   I have personally completed a history, physical exam & review of systems for this patient (see notes).     Mallampati Airway Assessment:  Mallampati Class I - (soft palate, fauces, uvula & anterior/posterior tonsillar pillars are visible)    Prior History of Anesthesia Complications:   none    ASA Classification:  Class 3 - A patient with severe systemic disease that limits activity but is not incapacitating    Sedation/ Anesthesia Plan:   intravenous sedation    Medications Planned:   midazolam (Versed) intravenously    Patient is an appropriate candidate for plan of sedation: yes    Electronically signed by Yesion Vivar MD on 7/19/2022 at 4:44

## 2022-07-19 NOTE — PROGRESS NOTES
Speech Language Pathology  Patient name: Fanny Jackson  Patient : 1945    Admit Diagnosis: The primary encounter diagnosis was NSTEMI (non-ST elevated myocardial infarction) ([facility] Diagnoses of Pneumonia of right lung due to infectious organism, unspecified part of lung and Hypoxia were also pertinent to this visit. Referral received for swallow screen per Dr. Delano Sevilla MD Referral.  This SLP performed a chart review. On attempts to reach RN; was told RN ws unavailable. Chart review:   Current diet: regular consistency diet with DM restrictions  X-ray Chest: 2022  Impression   Improved aeration of the right chest with persistent consolidation in the mid   to upper right chest.      has a past medical history of Chronic pain syndrome, Colorectal polyps, COPD (chronic obstructive pulmonary disease) ([facility] CTS (carpal tunnel syndrome), DDD (degenerative disc disease), lumbar, Depression, Family hx of colon cancer, Fibromyalgia, Hyperlipemia, IBS (irritable bowel syndrome), Lumbar spondylosis, New onset type 2 diabetes mellitus ([address] (2/3/2020), and Urticaria, chronic. Recommend  1. If MD is concerned for Dysphagia factors contributing to current medical issues; please write MD order for Clinical Evaluation of Swallowing to assess oral and pharyngeal phases of the swallow and identify least restrictive po diet consistencies and therapy candidacy        Thank you     Signed  Charlie Valencia,MS,CCC,SLP [id number]  Speech and Language Pathologist  2022 07:17 am

## 2022-07-19 NOTE — PLAN OF CARE
Problem: Discharge Planning  Goal: Discharge to home or other facility with appropriate resources  7/19/2022 0256 by Erika Cuba RN  Outcome: Progressing Towards Goal  7/19/2022 0256 by Erika Cuba RN  Outcome: Progressing Towards Goal     Problem: Pain  Goal: Verbalizes/displays adequate comfort level or baseline comfort level  7/19/2022 0256 by Erika Cuba RN  Outcome: Progressing Towards Goal  7/19/2022 0256 by Erika Cuba RN  Outcome: Progressing Towards Goal     Problem: Confusion  Goal: Confusion, delirium, dementia, or psychosis is improved or at baseline  Description: INTERVENTIONS:  1. Assess for possible contributors to thought disturbance, including medications, impaired vision or hearing, underlying metabolic abnormalities, dehydration, psychiatric diagnoses, and notify attending LIP  2. [place] high risk fall precautions, as indicated  3. Provide frequent short contacts to provide reality reorientation, refocusing and direction  4. Decrease environmental stimuli, including noise as appropriate  5. Monitor and intervene to maintain adequate nutrition, hydration, elimination, sleep and activity  6. If unable to ensure safety without constant attention obtain sitter and review sitter guidelines with assigned personnel  7. Initiate Psychosocial CNS and Spiritual Care consult, as indicated  7/19/2022 0256 by Erika Cuba RN  Outcome: Progressing Towards Goal  7/19/2022 0256 by Erika Cuba RN  Outcome: Progressing Towards Goal     Problem: Skin/Tissue Integrity  Goal: Absence of new skin breakdown  Description: 1. Monitor for areas of redness and/or skin breakdown  2. Assess vascular access sites hourly  3. Every 4-6 hours minimum:  Change oxygen saturation probe site  4. Every 4-6 hours:  If on nasal continuous positive airway pressure, respiratory therapy assess nares and determine need for appliance change or resting period.   7/19/2022 0256 by Erika Cuba RN  Outcome: Progressing Towards Goal  7/19/2022 0256 by Gonzalo Esposito, RN  Outcome: Progressing Towards Goal     Problem: Safety - Adult  Goal: Free from fall injury  7/19/2022 0256 by Gonzalo Esposito, RN  Outcome: Progressing Towards Goal  7/19/2022 0256 by Gonzalo Esposito, RN  Outcome: Progressing Towards Goal     Problem: ABCDS Injury Assessment  Goal: Absence of physical injury  7/19/2022 0256 by Gonzalo Esposito, RN  Outcome: Progressing Towards Goal  7/19/2022 0256 by Gonzalo Esposito, RN  Outcome: Progressing Towards Goal

## 2022-07-19 NOTE — OP NOTE
[address]   Procedure Note    CLINICAL HISTORY:       Fanny Jackson is a 78 y.o. female with a history of tobacco use. She was admitted with complaints of dyspnea. Cardiac markers were positive. EKG showed anterior T-wave inversion.     Patient Active Problem List   Diagnosis    Osteopenia-last dexa 2009 repeat 2015 (-2.4 hip)--advised tx    Colon polyp, hyperplastic,-(done 3/11-repeat 3-5 yrs--dr Baldwin Leyden)    Family history of colon cancer    CTS (carpal tunnel syndrome)BILATERAL-s/p surgical repair--resolved     Postmenopausal status-last mammogram 2/15 wnl last pap 12/13--wnl    Nondependent alcohol abuse, in remission    Urticaria, chronic    Tobacco abuse-advised to quit--lung cancer screening neg 5/18--repeat low dose ct 1 yr    Chronic back pain-(djd) saw dr Gerard Giraldo afford surgery--seeing dr Li Acevedo for pain meds    Fibromyalgia    Hypercholesteremia    Lumbar spondylosis    Vitamin D deficiency--severe--started 50,000 iu 2x/ wk 11/13    Insomnia--on elevil w help    Constipation--on daily miralax    Depression    Chronic pain syndrome    Muscle cramping    Acute on chronic respiratory failure with hypercapnia ([address]    ROLY (acute kidney injury) ([address]    Severe sepsis (HCC)    Gastroesophageal reflux disease    COPD, severe ([address]    Chronic hypoxemic respiratory failure ([address]    Degeneration of lumbar or lumbosacral intervertebral disc    Trochanteric bursitis of right hip    COPD exacerbation ([address]    Diabetes ([address]    Controlled type 2 diabetes mellitus without complication, without long-term current use of insulin ([address]    Age-related osteoporosis without current pathological fracture- started alendronate 9/2021    Pulmonary nodule seen on imaging study    Pneumonia of right upper lobe due to infectious organism    General weakness    Elevated lactic acid level    Community acquired pneumonia of right lung    Chronic obstructive pulmonary disease ([address]    Acute respiratory failure with hypoxia ([facility]    Acute on chronic respiratory failure with hypoxia and hypercapnia (HCC)    Abnormal EKG       Prior to Admission medications    Medication Sig Start Date End Date Taking? Authorizing Provider   gabapentin (NEURONTIN) 600 MG tablet Take 1 tablet by mouth 2 times daily for 30 days. 7/13/22 8/12/22  Greta Bangura MD   oxyCODONE-acetaminophen (PERCOCET) 5-325 MG per tablet Take 1 tablet by mouth every 6 hours as needed for Pain for up to 7 days.  7/13/22 7/20/22  Greta Bangura MD   nystatin (MYCOSTATIN) 441729 UNIT/ML suspension Take 5 mLs by mouth 4 times daily 7/13/22   Greta Bangura MD   benzocaine-menthol (CEPACOL SORE THROAT) 15-3.6 MG lozenge Take 1 lozenge by mouth every 2 hours as needed for Sore Throat 7/13/22   Greta Bangura MD   DULoxetine (CYMBALTA) 60 MG extended release capsule TAKE 1 CAPSULE BY MOUTH TWICE A DAY 6/14/22   HECTOR Caceres CNP   pantoprazole (PROTONIX) 40 MG tablet Take 1 tablet by mouth daily 6/7/22   Jimenez Cazares MD   atorvastatin (LIPITOR) 80 MG tablet TAKE 1 TABLET BY MOUTH EVERY DAY FOR CHOLESTEROL 6/3/22   HECTOR Caceres CNP   tiZANidine (ZANAFLEX) 4 MG tablet Take 1 tablet by mouth nightly 5/10/22   Jimenez Cazares MD   meloxicam (MOBIC) 15 MG tablet Take 1 tablet by mouth daily 5/10/22   Jimenez Cazares MD   traZODone (DESYREL) 50 MG tablet Take 1-2 tablets by mouth nightly 4/12/22   Jimenez Cazares MD   Fluticasone-Umeclidin-Vilant (TRELEGY ELLIPTA) 200-62.5-25 MCG/INH AEPB Inhale 1 Inhaler into the lungs daily 4/5/22   HECTOR Dennis CNP   magnesium oxide (MGO) 400 (241.3 Mg) MG TABS tablet Take 1 tablet by mouth 2 times daily as needed (for cramping) 3/15/22   Jimenez Cazares MD   alendronate (FOSAMAX) 70 MG tablet TAKE 1 TABLET BY MOUTH ONE TIME PER WEEK 3/8/22   HECTOR Caceres - CNP   albuterol sulfate  (90 Base) MCG/ACT inhaler Inhale 2 puffs into the lungs every 6 hours as needed for Shortness of Breath 12/29/21   Deanna Beltran Chrissy Thapa MD   Calcium Citrate-Vitamin D 500-500 MG-UNIT CHEW Take 1 tablet by mouth 2 times daily 9/27/21   HECTOR Salgado CNP   ONE TOUCH LANCETS MISC 1 each by Does not apply route daily 2/7/20   HECTOR Salgado CNP   ipratropium-albuterol (DUONEB) 0.5-2.5 (3) MG/3ML SOLN nebulizer solution Take 1 aerosol every 4 hours for 2 days and then as needed for shortness of breath coughing and wheezing 9/4/19   Naeem Burns MD   Cholecalciferol (VITAMIN D) 2000 units TABS tablet Take 1 tablet by mouth daily 7/12/19   HECTOR Salgado CNP   cetirizine (ZYRTEC) 10 MG tablet Take 10 mg by mouth daily as needed for Allergies    Historical Provider, MD   Nebulizers (COMPRESSOR/NEBULIZER) MISC 1 Units by Does not apply route 4 times daily 3/30/19   Graeme Smiley MD          The risks, benefits, and details of the procedure were explained to the patient. The patient verbalized understanding and wanted to proceed. Informed written consent was obtained. INDICATION:  NSTEMI    PROCEDURES PERFORMED:   [facility]     PROCEDURE TECHNIQUE:  The patient was approached from the right radial artery using a 5-6  French slender sheath. Left coronary angiography was done using a Anneliese L3.5 diagnostic catheter. Right coronary angiography was done using a Anneliese R4 guide catheter. CONTRAST:  Total of 60 cc. COMPLICATIONS:  None. At the end of the procedure a TR device was used for hemostasis. EBL: 5 cc    Moderate Sedation:  Start time: 415  Stop time: 442  0.5 mg versed   25 mcg fentanyl   An independent trained observer pushed medications at my direction. We monitored the patient's level of consciousness and vital signs/physiologic status throughout the procedure duration (see start and start times above). HEMODYNAMICS:  Aortic pressure was 140/80;  LVEDP 16. There was no gradient between the left ventricle and aorta.         ANGIOGRAM/CORONARY ARTERIOGRAM:       The left main coronary artery is normal .    The left anterior descending artery has mild diffuse disease . The left circumflex artery is normal    OM1 99% .     The right coronary artery is normal .    SUMMARY:   Single vessel CAD         RECOMMENDATION:      - severe radial spasm, will need intervention from the femoral approach   - Load with effient and start 10 mg po qd   - asa 81 qd  - restart heparin gtt in 3 hrs   - staged intervention the OM1 Thursday AM, NPO wed MN      Harshil Flowers MD [address] [facility], Interventional Cardiology, and Peripheral Vascular [address] [facility]   (C): [phone]  (O): [phone]

## 2022-07-19 NOTE — CONSULTS
Cardiology Consultation     Ileana Dimas  1945    PCP: Anabelle Hernandez, APRN - CNP  Referring Physician: Dr Priscilla Pineda  Reason for Referral: Elevated troponin  Chief Complaint:   Chief Complaint   Patient presents with    Shortness of Breath     At [address] [facility] for rehab for pneumonia increased sob over the last few days. Pt on 4-5 lpm via nc all the time. Per EMS the cord was very long ems placed on 6lpm via nc and o2 sat came up to 99%       Subjective:      History of Present Illness: The patient is 78 y.o. female with a past medical history significant for COPD on 4 L oxygen at home, HLD, depression,  who presents with SOB. Patient was recently hospitalized during 2 weeks for Pseudomonas pneumonia and was discharged 4 days ago to our facility. Patient shortness of breath did not improve despite antibiotic therapy and was asked to go to the hospital again. EMS placed the patient on 6 L nasal canula on arrival.  On evaluation in the emergency department EKG showed what T wave inversion in V2-5. Aspirin and heparin was started. Patient has not had chest pain since admission. Patient was also started on breathing treatment, Cefepime and azithromycin. On my evaluation patient was on 5 L on oxygen with SpO2 >90%. Patient looked tired and short of breath when she talks. Patient denies any  chest pain, palpitations, syncope, orthopnea.        Past Medical History:   Diagnosis Date    Chronic pain syndrome     Colorectal polyps     COPD (chronic obstructive pulmonary disease) (HCC)     CTS (carpal tunnel syndrome)     BILATERAL    DDD (degenerative disc disease), lumbar     Depression     Family hx of colon cancer     Fibromyalgia     Hyperlipemia     IBS (irritable bowel syndrome)     Lumbar spondylosis     New onset type 2 diabetes mellitus ([facility] [address] 2/3/2020    Urticaria, chronic      Past Surgical History:   Procedure Laterality Date    CARPAL TUNNEL RELEASE Bilateral     CERVICAL POLYP REMOVAL  9/2004    INTRACAPSULAR CATARACT EXTRACTION Left 6/23/2020    Phacoemulsification with intraocular lens implant performed by Alfonso Polo MD at [address] Right 7/14/2020    Phacoemulsification with intraocular lens implant performed by Alfonso Polo MD at [address]      patient denies      Family History   Problem Relation Age of Onset    Cancer Father         COLON     Alzheimer's Disease Mother     Heart Disease Brother     Heart Failure Brother     Diabetes Daughter     Diabetes Daughter     Diabetes Daughter      Social History     Tobacco Use    Smoking status: Every Day     Packs/day: 1.00     Years: 55.00     Pack years: 55.00     Types: Cigarettes     Start date: 1/1/1962    Smokeless tobacco: Never    Tobacco comments:     almost ready to quit   Vaping Use    Vaping Use: Never used   Substance Use Topics    Alcohol use: No     Alcohol/week: 0.0 standard drinks    Drug use: No       No Known Allergies  Current Facility-Administered Medications   Medication Dose Route Frequency Provider Last Rate Last Admin    HYDROcodone-acetaminophen (NORCO) 5-325 MG per tablet 1 tablet  1 tablet Oral Q6H PRN Santiago Rodriugez MD        heparin 25,000 unit in sodium chloride 0.45% 250 mL (premix) infusion  0-3,000 Units/hr IntraVENous Continuous Camila Quinones MD 11.1 mL/hr at 07/19/22 1104 1,110 Units/hr at 07/19/22 1104    traZODone (DESYREL) tablet 100 mg  100 mg Oral Nightly PRN Camila Quinones MD        tiZANidine (ZANAFLEX) tablet 4 mg  4 mg Oral Nightly Camila Quinones MD        pantoprazole (PROTONIX) tablet 40 mg  40 mg Oral Daily Camila Quinones MD   40 mg at 07/19/22 0851    ipratropium-albuterol (DUONEB) nebulizer solution 1 ampule  1 ampule Inhalation Q4H PRN Camila Quinones MD        gabapentin (NEURONTIN) capsule 600 mg  600 mg Oral BID Camila Quinones MD   600 mg at 07/19/22 8569    DULoxetine (CYMBALTA) extended release capsule 60 mg  60 mg Oral BID Harsh Shelley MD   60 mg at 07/19/22 0851    cetirizine (ZYRTEC) tablet 10 mg  10 mg Oral Daily PRN Harsh Shelley MD        atorvastatin (LIPITOR) tablet 80 mg  80 mg Oral Nightly Harsh Shelley MD   80 mg at 07/18/22 2332    albuterol sulfate HFA (PROVENTIL;VENTOLIN;PROAIR) 108 (90 Base) MCG/ACT inhaler 2 puff  2 puff Inhalation Q6H PRN Harsh Shelley MD        insulin lispro (HUMALOG) injection vial 0-12 Units  0-12 Units SubCUTAneous TID WC Harsh Shelley MD        insulin lispro (HUMALOG) injection vial 0-6 Units  0-6 Units SubCUTAneous Nightly Hasrh Shelley MD   1 Units at 07/18/22 2332    glucose chewable tablet 16 g  4 tablet Oral PRN Harsh Shelley MD        dextrose bolus 10% 125 mL  125 mL IntraVENous PRN Harsh Shelley MD        Or    dextrose bolus 10% 250 mL  250 mL IntraVENous PRN Harsh Shelley MD        glucagon (rDNA) injection 1 mg  1 mg IntraMUSCular PRN Harsh Shelley MD        dextrose 5 % solution  100 mL/hr IntraVENous PRN Harsh Shelley MD        sodium chloride flush 0.9 % injection 10 mL  10 mL IntraVENous 2 times per day Harsh Shelley MD   10 mL at 07/19/22 0932    sodium chloride flush 0.9 % injection 10 mL  10 mL IntraVENous PRN Harsh Shelley MD        0.9 % sodium chloride infusion   IntraVENous PRN Harsh Shelley MD        ondansetron [facility]) injection 4 mg  4 mg IntraVENous Q4H PRN Harsh Shelley MD        polyethylene glycol (GLYCOLAX) packet 17 g  17 g Oral Daily PRN Harsh Shelley MD        acetaminophen (TYLENOL) tablet 650 mg  650 mg Oral Q4H PRN Harsh Shelley MD   650 mg at 07/19/22 2097    Or    acetaminophen (TYLENOL) suppository 650 mg  650 mg Rectal Q4H PRN Harsh Shelley MD        cefepime (MAXIPIME) 2000 mg IVPB extended (mini-bag)  2,000 mg IntraVENous Q12H Harsh Shelley MD   Stopped at 07/19/22 0610    azithromycin (ZITHROMAX) 500 mg in D5W 250ml addavial  500 mg IntraVENous Q24H Cole Lundberg MD   Stopped at 07/19/22 1109    mometasone-formoterol (DULERA) 200-5 MCG/ACT inhaler 2 puff  2 puff Inhalation BID Singh Jim, RPH   2 puff at 07/19/22 0957    And    tiotropium (SPIRIVA RESPIMAT) 2.5 MCG/ACT inhaler 2 puff  2 puff Inhalation Daily Singh Jim, RPH   2 puff at 07/19/22 0957       Review of Systems:  Constitutional: No unanticipated weight loss. There's been no change in energy level, sleep pattern, or activity level. No fevers, chills. Eyes: No visual changes or diplopia. No scleral icterus. ENT: No Headaches, hearing loss or vertigo. No mouth sores or sore throat. Cardiovascular: as reviewed in HPI  Respiratory: No cough or wheezing, no sputum production. No hemoptysis. Gastrointestinal: No abdominal pain, appetite loss, blood in stools. No change in bowel or bladder habits. Genitourinary: No dysuria, trouble voiding, or hematuria. Musculoskeletal:  No gait disturbance, no joint complaints. Integumentary: No rash or pruritis. Neurological: No headache, diplopia, change in muscle strength, numbness or tingling. Psychiatric: No anxiety or depression. Endocrine: No temperature intolerance. No excessive thirst, fluid intake, or urination. No tremor. Hematologic/Lymphatic: No abnormal bruising or bleeding, blood clots or swollen lymph nodes. Allergic/Immunologic: No nasal congestion or hives. Physical Exam:   /66   Pulse 90   Temp 98.6 °F (37 °C) (Oral)   Resp 20   Ht 5' 4\" (1.626 m)   Wt 136 lb 11 oz (62 kg)   SpO2 93%   BMI 23.46 kg/m²   Wt Readings from Last 3 Encounters:   07/19/22 136 lb 11 oz (62 kg)   07/13/22 133 lb 13.1 oz (60.7 kg)   06/20/22 138 lb (62.6 kg)     Constitutional: She is oriented to person, place, and time. She is ill appearing and becomes SOB with speak. In no acute distress. Head: Normocephalic and atraumatic. Pupils equal and round. Neck: Neck supple.  No JVP or carotid bruit appreciated. No mass and no thyromegaly present. No lymphadenopathy present. Cardiovascular: Normal rate. Normal heart sounds. Exam reveals no gallop and no friction rub. No murmur heard. Pulmonary/Chest: Effort normal and breath sounds normal. No respiratory distress. Diffuse bilateral ronchi. Abdominal: Soft, non-tender. Bowel sounds are normal. She exhibits no organomegaly, mass or bruit. Extremities: No edema. No cyanosis or clubbing. Pulses are 2+ radial/carotid bilaterally. Neurological: No gross cranial nerve deficit. Coordination normal.   Skin: Skin is warm and dry. There is no rash or diaphoresis. Psychiatric: She has a normal mood and affect. Her speech is normal and behavior is normal.     Lab Review:   FLP:    Lab Results   Component Value Date/Time    TRIG 146 02/17/2022 10:29 AM    HDL 59 02/17/2022 10:29 AM    LDLCALC 83 02/17/2022 10:29 AM    LDLDIRECT 176 10/12/2012 11:00 AM    LABVLDL 29 02/17/2022 10:29 AM     BUN/Creatinine:    Lab Results   Component Value Date/Time    BUN 14 07/19/2022 07:43 AM    CREATININE 0.8 07/19/2022 07:43 AM     PT/INR, TNI, HGB A1C:   Lab Results   Component Value Date/Time    TROPONINI 0.52 (HH) 07/18/2022 10:29 PM    LABA1C 6.0 07/03/2022 08:26 PM      No results found for: CBCAUTODIF    EKG Interpretation: Sinus rhythm, HR 98, no axis deviation, new T wave inversion V2-5. Echo: None    Stress Test: 2006 No ischemia noted. All above diagnostic testing and laboratory data was independently visualized and reviewed by me (not simply review of report)       Assessment and Plan   1) NSTEMI  Troponin trending down, V2-V5 T wave inversions, No CP.   -Consider Echo   -Continue heparin drip  -Continue aspirin  -Continue statin     Overall, the problems requiring hospitalization are high in severity     Thank you very much for allowing me to participate in the care of your patient. Please do not hesitate to contact me if you have any questions.     Thank you for allowing us to participate in the care of Fanny Jackson. Nadeem Jordan MD, PGY-1  07/19/22  11:12 AM    This patient will be staffed and discussed with Dr Art Jensen.

## 2022-07-19 NOTE — ED NOTES
ED SBAR report provider to [facility]. Patient to be transported to Room 4129 via stretcher by transport tech. Patient transported with bedside cardiac monitor and with IV medications infusing. IV site clean, dry, and intact. MEWS score and pain assessed as 10/10 and documented. Updated patient and family on plan of care.      Winston Walsh RN  07/18/22 4760

## 2022-07-19 NOTE — PLAN OF CARE
Problem: Discharge Planning  Goal: Discharge to home or other facility with appropriate resources  7/19/2022 1007 by Patric Boxer, RN  Outcome: Progressing Towards Goal  Flowsheets (Taken 7/19/2022 0257 by Meeta Calero RN)  Discharge to home or other facility with appropriate resources: Identify barriers to discharge with patient and caregiver  7/19/2022 0256 by Meeta Calero RN  Outcome: Progressing Towards Goal     Problem: Pain  Goal: Verbalizes/displays adequate comfort level or baseline comfort level  7/19/2022 1007 by Patric Boxer, RN  Outcome: Progressing Towards Goal  7/19/2022 0256 by Meeta Calero RN  Outcome: Progressing Towards Goal     Problem: Confusion  Goal: Confusion, delirium, dementia, or psychosis is improved or at baseline  Description: INTERVENTIONS:  1. Assess for possible contributors to thought disturbance, including medications, impaired vision or hearing, underlying metabolic abnormalities, dehydration, psychiatric diagnoses, and notify attending LIP  2. Poll[place] high risk fall precautions, as indicated  3. Provide frequent short contacts to provide reality reorientation, refocusing and direction  4. Decrease environmental stimuli, including noise as appropriate  5. Monitor and intervene to maintain adequate nutrition, hydration, elimination, sleep and activity  6. If unable to ensure safety without constant attention obtain sitter and review sitter guidelines with assigned personnel  7. Initiate Psychosocial CNS and Spiritual Care consult, as indicated  7/19/2022 1007 by Patric Boxer, RN  Outcome: Progressing Towards Goal  7/19/2022 0256 by Meeta Calero RN  Outcome: Progressing Towards Goal     Problem: Skin/Tissue Integrity  Goal: Absence of new skin breakdown  Description: 1. Monitor for areas of redness and/or skin breakdown  2. Assess vascular access sites hourly  3. Every 4-6 hours minimum:  Change oxygen saturation probe site  4.   Every 4-6 hours:  If on nasal continuous positive airway pressure, respiratory therapy assess nares and determine need for appliance change or resting period.   7/19/2022 1007 by Antonio Giordano RN  Outcome: Progressing Towards Goal  7/19/2022 0256 by Nestor Diaz RN  Outcome: Progressing Towards Goal     Problem: Safety - Adult  Goal: Free from fall injury  7/19/2022 1007 by Antonio Giordano RN  Outcome: Progressing Towards Goal  7/19/2022 0256 by Nestor Diaz RN  Outcome: Progressing Towards Goal     Problem: ABCDS Injury Assessment  Goal: Absence of physical injury  7/19/2022 1007 by Antonio Giordano RN  Outcome: Progressing Towards Goal  7/19/2022 0256 by Nestor Diaz RN  Outcome: Progressing Towards Goal

## 2022-07-19 NOTE — CARE COORDINATION
Patient came from Home at [facility] prior to arrival.  Call to Eileen Grimes, [phone], at Home at [facility] who confirmed the patient is:  [] [address], no Precert required for return.  [] [address] will be required for return. [] Skilled Nursing Care, no Precert required for return. [x] [address] required for return. [x] Is fully vaccinated for Covid. [] Has started Covid vaccination, but is not complete. [] Is not vaccinated for Covid. [] No Covid Test needed for return. [x] Rapid Covid test needed before return within: [x] 48 hours, [] 72 hours, [] 5 days, [] other   [] PCR Covid test needed before return. [x] Confirmed with the patient or family that the plan is to return to this facility at D/C. [] Patient and/or designated decision maker does not plan for the patient to return to this facility at D/C.      Electronically signed by Vincent Durán RN on 7/19/2022 at 12:54 PM

## 2022-07-19 NOTE — PROGRESS NOTES
Pt back to room 4129 from cath lab. Pt A&O, VSS, denies any current pain. R arm dressing is clean dry and intact, showing no signs of any bleeding. Pt educated she cannot get out of bed and not to use her right arm. Pt states understanding. All safety measures in place.      Electronically signed by Patric Boxer, RN on 7/19/2022 at 6:40 PM

## 2022-07-19 NOTE — PROGRESS NOTES
Clinical Pharmacy Note  Heparin Dosing       Lab Results   Component Value Date/Time    APTT 61.5 07/19/2022 09:43 AM     Lab Results   Component Value Date/Time    HGB 9.5 07/19/2022 07:43 AM    HCT 28.3 07/19/2022 07:43 AM     07/19/2022 07:43 AM       Current Infusion Rate: 990 units/hr    Plan:  Bolus: 1850 units  Rate: Increase to 1110 units/hr  Next aPTT: 1700 7/19/22    Pharmacy will continue to monitor and adjust based on aPTT results.     Judith Peralta [facility]  7/19/2022 10:46 AM yes

## 2022-07-19 NOTE — PROGRESS NOTES
Pt admitted to room 4129. Pts significant other, Nai Hansen in room. Pt reports being able to ambulate with a walker. Pt is currently lethargic and easily falls asleep with conversation. Per significant other, this has been normal since she has been sick. All medications were updated and orientation to the room was completed. Pt resting comfortably, bed alarm on, call light within reach and pts family in room. No further complaints.

## 2022-07-19 NOTE — DISCHARGE INSTR - COC
Continuity of Care Form    Patient Name: Cierra Leal   :    MRN:  [id number]    Admit date:  2022  Discharge date:  08/15/2022    Code Status Order: Full Code   Advance Directives:     Admitting Physician:  Kayla Wills MD  PCP: HECTOR De Leon CNP    Discharging Nurse: [facility] Unit/Room#: V8G-5290/4150-92  Discharging Unit Phone Number: [phone]    Emergency Contact:   Extended Emergency Contact Information  Primary Emergency Contact: Phillip Gonsalves  Address: [address]           [place], [facility] of [address] Phone: [phone]  Mobile Phone: [phone]  Relation: Domestic Partner   needed?  No  Secondary Emergency Contact: [address]  Work Phone: [phone]  Mobile Phone: [phone]  Relation: Child    Past Surgical History:  Past Surgical History:   Procedure Laterality Date    CARPAL TUNNEL RELEASE Bilateral     CERVICAL POLYP REMOVAL  2004    INTRACAPSULAR CATARACT EXTRACTION Left 2020    Phacoemulsification with intraocular lens implant performed by Corinne Harm, MD at [address] Right 2020    Phacoemulsification with intraocular lens implant performed by Corinne Harm, MD at [address]      patient denies        Immunization History:   Immunization History   Administered Date(s) Administered    COVID-19, MODERNA BLUE border, Primary or Immunocompromised, (age 12y+), IM, 100 mcg/0.5mL 2021, 2021    COVID-19, MODERNA Booster BLUE border, (age 18y+), IM, 50mcg/0.25mL 2021    Influenza Vaccine, unspecified formulation 01/10/2017    Influenza, High Dose (Fluzone 65 yrs and older) 2013, 2016, 2017, 10/30/2018, 2020    Influenza, Quadv, adjuvanted, 65 yrs +, IM, PF (Fluad) 2021    Influenza, Triv, inactivated, subunit, adjuvanted, IM (Fluad 65 yrs and older) 09/13/2019    Pneumococcal Conjugate 13-valent ([id number]) 01/19/2015    Pneumococcal Conjugate Vaccine 01/10/2017    Pneumococcal Polysaccharide ([id number]) 10/12/2012    Tdap (Boostrix, Adacel) 07/17/2007    Zoster Recombinant (Shingrix) 11/21/2019       Active Problems:  Patient Active Problem List   Diagnosis Code    Osteopenia-last dexa 2009 repeat 2015 (-2.4 hip)--advised tx M85.80    Colon polyp, hyperplastic,-(done 3/11-repeat 3-5 yrs--dr Asad Mills) K63.5    Family history of colon cancer Z80.0    CTS (carpal tunnel syndrome)BILATERAL-s/p surgical repair--resolved  G56.00    Postmenopausal status-last mammogram 2/15 wnl last pap 12/13--wnl Z78.0    Nondependent alcohol abuse, in remission F10.11    Urticaria, chronic L50.8    Tobacco abuse-advised to quit--lung cancer screening neg 5/18--repeat low dose ct 1 yr Z72.0    Chronic back pain-(djd) saw dr Jose Armando [facility] surgery--seeing dr Rosie Villar for pain meds M54.9, G89.29    Fibromyalgia M79.7    Hypercholesteremia E78.00    Lumbar spondylosis M47.816    Vitamin D deficiency--severe--started 50,000 iu 2x/ wk 11/13 E55.9    Insomnia--on elevil w help G47.00    Constipation--on daily miralax K59.00    Depression F32. A    Chronic pain syndrome G89.4    Muscle cramping R25.2    Acute on chronic respiratory failure with hypercapnia ([facility]) J96.22    ROLY (acute kidney injury) ([facility] [address] N17.9    Severe sepsis ([facility]) A41.9, R65.20    Gastroesophageal reflux disease K21.9    COPD, severe ([facility]) J44.9    Chronic hypoxemic respiratory failure ([facility]) J96.11    Degeneration of lumbar or lumbosacral intervertebral disc M51.37    Trochanteric bursitis of right hip M70.61    COPD exacerbation ([facility]) J44.1    Diabetes ([facility] [address] E11.9    Controlled type 2 diabetes mellitus without complication, without long-term current use of insulin (HCC) E11.9    Age-related osteoporosis without current pathological fracture- started alendronate 9/2021 M81.0    Pulmonary nodule seen on imaging study R91.1    Pneumonia of right upper lobe due to infectious organism J18.9    General weakness R53.1    Elevated lactic acid level R79.89    Community acquired pneumonia of right lung J18.9    Chronic obstructive pulmonary disease (HCC) J44.9    Acute respiratory failure with hypoxia (HCC) J96.01    Acute on chronic respiratory failure with hypoxia and hypercapnia (HCC) J96.21, J96.22    Abnormal EKG R94.31       Isolation/Infection:   Isolation            No Isolation          Patient Infection Status       Infection Onset Added Last Indicated Last Indicated By Review Planned Expiration Resolved Resolved By    None active    Resolved    COVID-19 (Rule Out) 07/18/22 07/18/22 07/18/22 COVID-19, Rapid (Ordered)   07/18/22 Rule-Out Test Resulted    COVID-19 (Rule Out) 07/03/22 07/03/22 07/03/22 COVID-19, Rapid (Ordered)   07/03/22 Rule-Out Test Resulted            Nurse Assessment:  Last Vital Signs: /66   Pulse 90   Temp 98.6 °F (37 °C) (Oral)   Resp 20   Ht 5' 4\" (1.626 m)   Wt 136 lb 11 oz (62 kg)   SpO2 93%   BMI 23.46 kg/m²     Last documented pain score (0-10 scale): Pain Level: 9  Last Weight:   Wt Readings from Last 1 Encounters:   07/19/22 136 lb 11 oz (62 kg)     Mental Status:  oriented and alert    IV Access:  - PICC - site  L Upper Arm, insertion date: 07/25/2022    Nursing Mobility/ADLs:  Walking   Assisted  Transfer  Assisted  Bathing  Assisted  Dressing  Assisted  Toileting  Assisted  Feeding  Assisted  Med Admin  Assisted  Med Delivery   whole    Wound Care Documentation and Therapy:        Elimination:  Continence: Bowel: Yes  Bladder: Yes  Urinary Catheter: None   Colostomy/Ileostomy/Ileal Conduit: No       Date of Last BM: 08/16/2022    Intake/Output Summary (Last 24 hours) at 7/19/2022 1307  Last data filed at 7/19/2022 0614  Gross per 24 hour   Intake 59 ml   Output --   Net 59 ml     I/O last 3 completed shifts: In: 61 [I.V.:58.1;  IV Piggyback:0.9]  Out: -     Safety Physician Certification: I certify the above information and transfer of Adan Charter  is necessary for the continuing treatment of the diagnosis listed and that she requires [facility] for less 30 days.      Update Admission H&P: No change in H&P    PHYSICIAN SIGNATURE:  Electronically signed by Kimberly Weeks MD on 8/1/22 at 9:10 AM EDT

## 2022-07-20 LAB
ANION GAP SERPL CALCULATED.3IONS-SCNC: 3 MMOL/L (ref 3–16)
APTT: 77.1 SEC (ref 23–34.3)
APTT: 81.1 SEC (ref 23–34.3)
BASOPHILS ABSOLUTE: 0 K/UL (ref 0–0.2)
BASOPHILS RELATIVE PERCENT: 0.5 %
BUN BLDV-MCNC: 11 MG/DL (ref 7–20)
CALCIUM SERPL-MCNC: 7.3 MG/DL (ref 8.3–10.6)
CHLORIDE BLD-SCNC: 98 MMOL/L (ref 99–110)
CO2: 32 MMOL/L (ref 21–32)
CREAT SERPL-MCNC: 0.7 MG/DL (ref 0.6–1.2)
EKG ATRIAL RATE: 96 BPM
EKG DIAGNOSIS: NORMAL
EKG P AXIS: 41 DEGREES
EKG P-R INTERVAL: 138 MS
EKG Q-T INTERVAL: 390 MS
EKG QRS DURATION: 80 MS
EKG QTC CALCULATION (BAZETT): 492 MS
EKG R AXIS: 24 DEGREES
EKG T AXIS: 51 DEGREES
EKG VENTRICULAR RATE: 96 BPM
EOSINOPHILS ABSOLUTE: 0.2 K/UL (ref 0–0.6)
EOSINOPHILS RELATIVE PERCENT: 2.8 %
GFR AFRICAN AMERICAN: >60
GFR NON-AFRICAN AMERICAN: >60
GLUCOSE BLD-MCNC: 101 MG/DL (ref 70–99)
GLUCOSE BLD-MCNC: 104 MG/DL (ref 70–99)
GLUCOSE BLD-MCNC: 107 MG/DL (ref 70–99)
GLUCOSE BLD-MCNC: 125 MG/DL (ref 70–99)
GLUCOSE BLD-MCNC: 133 MG/DL (ref 70–99)
HCT VFR BLD CALC: 24.7 % (ref 36–48)
HEMOGLOBIN: 8.5 G/DL (ref 12–16)
LYMPHOCYTES ABSOLUTE: 0.7 K/UL (ref 1–5.1)
LYMPHOCYTES RELATIVE PERCENT: 11.1 %
MCH RBC QN AUTO: 31.9 PG (ref 26–34)
MCHC RBC AUTO-ENTMCNC: 34.1 G/DL (ref 31–36)
MCV RBC AUTO: 93.3 FL (ref 80–100)
MONOCYTES ABSOLUTE: 0.5 K/UL (ref 0–1.3)
MONOCYTES RELATIVE PERCENT: 7.1 %
NEUTROPHILS ABSOLUTE: 5.1 K/UL (ref 1.7–7.7)
NEUTROPHILS RELATIVE PERCENT: 78.5 %
PDW BLD-RTO: 14 % (ref 12.4–15.4)
PERFORMED ON: ABNORMAL
PLATELET # BLD: 329 K/UL (ref 135–450)
PMV BLD AUTO: 7.1 FL (ref 5–10.5)
POTASSIUM REFLEX MAGNESIUM: 4.2 MMOL/L (ref 3.5–5.1)
RBC # BLD: 2.65 M/UL (ref 4–5.2)
SODIUM BLD-SCNC: 133 MMOL/L (ref 136–145)
WBC # BLD: 6.5 K/UL (ref 4–11)

## 2022-07-20 PROCEDURE — 97162 PT EVAL MOD COMPLEX 30 MIN: CPT

## 2022-07-20 PROCEDURE — 2500000003 HC RX 250 WO HCPCS: Performed by: INTERNAL MEDICINE

## 2022-07-20 PROCEDURE — 6370000000 HC RX 637 (ALT 250 FOR IP): Performed by: INTERNAL MEDICINE

## 2022-07-20 PROCEDURE — 97530 THERAPEUTIC ACTIVITIES: CPT

## 2022-07-20 PROCEDURE — 97116 GAIT TRAINING THERAPY: CPT

## 2022-07-20 PROCEDURE — 6360000002 HC RX W HCPCS: Performed by: INTERNAL MEDICINE

## 2022-07-20 PROCEDURE — 94640 AIRWAY INHALATION TREATMENT: CPT

## 2022-07-20 PROCEDURE — 36415 COLL VENOUS BLD VENIPUNCTURE: CPT

## 2022-07-20 PROCEDURE — 1200000000 HC SEMI PRIVATE

## 2022-07-20 PROCEDURE — 93010 ELECTROCARDIOGRAM REPORT: CPT | Performed by: INTERNAL MEDICINE

## 2022-07-20 PROCEDURE — 97166 OT EVAL MOD COMPLEX 45 MIN: CPT

## 2022-07-20 PROCEDURE — 85730 THROMBOPLASTIN TIME PARTIAL: CPT

## 2022-07-20 PROCEDURE — 94760 N-INVAS EAR/PLS OXIMETRY 1: CPT

## 2022-07-20 PROCEDURE — 99233 SBSQ HOSP IP/OBS HIGH 50: CPT | Performed by: INTERNAL MEDICINE

## 2022-07-20 PROCEDURE — 2580000003 HC RX 258: Performed by: INTERNAL MEDICINE

## 2022-07-20 PROCEDURE — 80048 BASIC METABOLIC PNL TOTAL CA: CPT

## 2022-07-20 PROCEDURE — 87449 NOS EACH ORGANISM AG IA: CPT

## 2022-07-20 PROCEDURE — 85025 COMPLETE CBC W/AUTO DIFF WBC: CPT

## 2022-07-20 PROCEDURE — 2700000000 HC OXYGEN THERAPY PER DAY

## 2022-07-20 PROCEDURE — 97535 SELF CARE MNGMENT TRAINING: CPT

## 2022-07-20 RX ADMIN — HYDROCODONE BITARTRATE AND ACETAMINOPHEN 1 TABLET: 5; 325 TABLET ORAL at 06:33

## 2022-07-20 RX ADMIN — AZITHROMYCIN MONOHYDRATE 500 MG: 500 INJECTION, POWDER, LYOPHILIZED, FOR SOLUTION INTRAVENOUS at 10:27

## 2022-07-20 RX ADMIN — CEFEPIME 2000 MG: 2 INJECTION, POWDER, FOR SOLUTION INTRAVENOUS at 19:09

## 2022-07-20 RX ADMIN — TIZANIDINE 4 MG: 4 TABLET ORAL at 21:26

## 2022-07-20 RX ADMIN — HYDROCODONE BITARTRATE AND ACETAMINOPHEN 1 TABLET: 5; 325 TABLET ORAL at 15:51

## 2022-07-20 RX ADMIN — HEPARIN SODIUM 1110 UNITS/HR: 10000 INJECTION, SOLUTION INTRAVENOUS at 06:29

## 2022-07-20 RX ADMIN — MOMETASONE FUROATE AND FORMOTEROL FUMARATE DIHYDRATE 2 PUFF: 200; 5 AEROSOL RESPIRATORY (INHALATION) at 20:52

## 2022-07-20 RX ADMIN — PANTOPRAZOLE SODIUM 40 MG: 40 TABLET, DELAYED RELEASE ORAL at 10:19

## 2022-07-20 RX ADMIN — DULOXETINE HYDROCHLORIDE 60 MG: 60 CAPSULE, DELAYED RELEASE ORAL at 21:26

## 2022-07-20 RX ADMIN — GABAPENTIN 600 MG: 300 CAPSULE ORAL at 10:19

## 2022-07-20 RX ADMIN — DULOXETINE HYDROCHLORIDE 60 MG: 60 CAPSULE, DELAYED RELEASE ORAL at 10:18

## 2022-07-20 RX ADMIN — CEFEPIME 2000 MG: 2 INJECTION, POWDER, FOR SOLUTION INTRAVENOUS at 06:31

## 2022-07-20 RX ADMIN — ACETAMINOPHEN 650 MG: 325 TABLET ORAL at 10:21

## 2022-07-20 RX ADMIN — ATORVASTATIN CALCIUM 40 MG: 40 TABLET, FILM COATED ORAL at 21:26

## 2022-07-20 RX ADMIN — TIOTROPIUM BROMIDE INHALATION SPRAY 2 PUFF: 3.12 SPRAY, METERED RESPIRATORY (INHALATION) at 09:08

## 2022-07-20 RX ADMIN — SODIUM CHLORIDE, PRESERVATIVE FREE 10 ML: 5 INJECTION INTRAVENOUS at 19:11

## 2022-07-20 RX ADMIN — MOMETASONE FUROATE AND FORMOTEROL FUMARATE DIHYDRATE 2 PUFF: 200; 5 AEROSOL RESPIRATORY (INHALATION) at 09:09

## 2022-07-20 RX ADMIN — CETIRIZINE HYDROCHLORIDE 10 MG: 10 TABLET, FILM COATED ORAL at 10:18

## 2022-07-20 RX ADMIN — GABAPENTIN 600 MG: 300 CAPSULE ORAL at 21:26

## 2022-07-20 ASSESSMENT — PAIN SCALES - GENERAL
PAINLEVEL_OUTOF10: 9

## 2022-07-20 ASSESSMENT — PAIN DESCRIPTION - DESCRIPTORS: DESCRIPTORS: ACHING

## 2022-07-20 ASSESSMENT — PAIN DESCRIPTION - LOCATION: LOCATION: BACK;ABDOMEN

## 2022-07-20 ASSESSMENT — PAIN DESCRIPTION - ORIENTATION: ORIENTATION: RIGHT;MID

## 2022-07-20 NOTE — PLAN OF CARE
Nutrition Problem #1: Inadequate oral intake  Intervention: Food and/or Nutrient Delivery: Continue Current Diet, Start Oral Nutrition Supplement  Nutritional

## 2022-07-20 NOTE — PROGRESS NOTES
Clinical Pharmacy Note  Heparin Dosing       Lab Results   Component Value Date/Time    APTT 81.1 07/20/2022 08:30 AM     Lab Results   Component Value Date/Time    HGB 8.5 07/20/2022 02:22 AM    HCT 24.7 07/20/2022 02:22 AM     07/20/2022 02:22 AM     Current Infusion Rate: 1110 units/hr    Plan:  -aPTT 81.1 seconds (within goal of  seconds)  -Plan:          -Rate: Continue 1110 units/hr        -Next aPTT: 0600 7/21/22    Pharmacy will continue to monitor and adjust based on aPTT results.     Asha [facility]wood [facility]  7/20/2022 9:11 AM

## 2022-07-20 NOTE — PROGRESS NOTES
Hospitalist Progress Note      PCP: HECTOR Henderson - CNP    Date of Admission: 7/18/2022    Chief Complaint:   Chief Complaint   Patient presents with    Shortness of Breath     At [facility] for rehab for pneumonia increased sob over the last few days. Pt on 4-5 lpm via nc all the time. Per EMS the cord was very long ems placed on 6lpm via nc and o2 sat came up to 99%         Hospital Course: Pt is an 78y.o. year-old female with a history of hyperlipidemia, fibromyalgia, chronic pain syndrome, severe COPD with chronic hypoxic respiratory failure requiring oxygen at 4 L/min via nasal cannula continuously. She presents to the emergency room for evaluation following a 2 to 3-day history of worsening shortness of breath. She was recently treated for Pseudomonas pneumonia and discharged to rehab. EMS reports that she was on a very long oxygen tube when they arrived. She was placed on 6 L nasal cannula and her oxygen saturation improved. In the emergency room she was found to have an abnormal EKG with inverted T waves in V2 through V5. Cardiology was consulted and asked that she be put on a heparin drip. Patient denies any current or recent chest pain. She is being admitted for further evaluation and treatment. Associated signs and symptoms do not include chest pain, lightheaded, dizziness, diaphoresis, edema, orthopnea, paroxysmal nocturnal dyspnea, fever or chills. No recent medication changes.      7/19  Feeling better, supplemental O2 5 L    Subjective: as above    Patient underwent cardiac cath yesterday has single-vessel disease likely intervention planned tomorrow  She is comfortable and ready to get this done  Patient is well-known to me from previous admission  Having chest pain presently  Medications:  Reviewed    Infusion Medications    sodium chloride      heparin (PORCINE) Infusion 1,110 Units/hr (07/20/22 6001)    dextrose      sodium chloride       Scheduled Medications atorvastatin  40 mg Oral Nightly    sodium chloride flush  5-40 mL IntraVENous 2 times per day    tiZANidine  4 mg Oral Nightly    pantoprazole  40 mg Oral Daily    gabapentin  600 mg Oral BID    DULoxetine  60 mg Oral BID    insulin lispro  0-12 Units SubCUTAneous TID     insulin lispro  0-6 Units SubCUTAneous Nightly    sodium chloride flush  10 mL IntraVENous 2 times per day    cefepime  2,000 mg IntraVENous Q12H    azithromycin  500 mg IntraVENous Q24H    mometasone-formoterol  2 puff Inhalation BID    And    tiotropium  2 puff Inhalation Daily     PRN Meds: HYDROcodone 5 mg - acetaminophen, perflutren lipid microspheres, perflutren lipid microspheres, sodium chloride flush, sodium chloride, acetaminophen, traZODone, ipratropium-albuterol, cetirizine, albuterol sulfate HFA, glucose, dextrose bolus **OR** dextrose bolus, glucagon (rDNA), dextrose, sodium chloride flush, sodium chloride, ondansetron, polyethylene glycol, acetaminophen **OR** acetaminophen      Intake/Output Summary (Last 24 hours) at 7/20/2022 1636  Last data filed at 7/20/2022 1020  Gross per 24 hour   Intake 480 ml   Output --   Net 480 ml         Physical Exam Performed:    BP (!) 104/57   Pulse 83   Temp 99.4 °F (37.4 °C) (Oral)   Resp 18   Ht 5' 4\" (1.626 m)   Wt 133 lb 2.5 oz (60.4 kg)   SpO2 91%   BMI 22.86 kg/m²     General appearance: No apparent distress, appears stated age and cooperative. HEENT: Pupils equal, round, and reactive to light. Conjunctivae/corneas clear. Neck: Supple, with full range of motion. No jugular venous distention. Trachea midline. Respiratory:  diffuse crackles and wheezing   Cardiovascular: Regular rate and rhythm with normal S1/S2 without murmurs, rubs or gallops. Abdomen: Soft, non-tender, non-distended with normal bowel sounds. Musculoskeletal: No clubbing, cyanosis or edema bilaterally. Full range of motion without deformity.   Skin: Skin color, texture, turgor normal.  No rashes or lesions. Neurologic:  Neurovascularly intact without any focal sensory/motor deficits. Cranial nerves: II-XII intact, grossly non-focal.  Psychiatric: Alert and oriented, thought content appropriate, normal insight  Capillary Refill: Brisk,3 seconds, normal   Peripheral Pulses: +2 palpable, equal bilaterally       Labs:   Recent Labs     07/18/22  1720 07/19/22  0743 07/20/22  0222   WBC 8.3 8.1 6.5   HGB 10.8* 9.5* 8.5*   HCT 31.7* 28.3* 24.7*    367 329       Recent Labs     07/18/22  1720 07/19/22  0743 07/20/22  0222   * 136 133*   K 3.9 4.1 4.2   CL 95* 96* 98*   CO2 31 34* 32   BUN 20 14 11   CREATININE 1.0 0.8 0.7   CALCIUM 8.8 8.2* 7.3*       Recent Labs     07/18/22 1720   AST 61*   ALT 65*   BILITOT 0.5   ALKPHOS 88       No results for input(s): INR in the last 72 hours. Recent Labs     07/18/22  1720 07/18/22 2229   TROPONINI 0.70* 0.52*     7/19/2022 cardiac cath     HEMODYNAMICS:  Aortic pressure was 140/80;  LVEDP 16. There was no gradient between the left ventricle and aorta. ANGIOGRAM/CORONARY ARTERIOGRAM:        The left main coronary artery is normal .     The left anterior descending artery has mild diffuse disease . The left circumflex artery is normal              OM1 99% .      The right coronary artery is normal .     SUMMARY:   Single vessel CAD      Urinalysis:      Lab Results   Component Value Date/Time    NITRU Negative 07/03/2022 03:11 PM    WBCUA 3-5 07/03/2022 03:11 PM    BACTERIA 4+ 07/03/2022 03:11 PM    RBCUA 0-2 07/03/2022 03:11 PM    BLOODU Negative 07/03/2022 03:11 PM    SPECGRAV 1.022 07/03/2022 03:11 PM    GLUCOSEU Negative 07/03/2022 03:11 PM       Radiology:  XR CHEST PORTABLE   Final Result   Improved aeration of the right chest with persistent consolidation in the mid   to upper right chest.                 Assessment/Plan:    Active Hospital Problems    Diagnosis     Acute on chronic respiratory failure with hypoxia and hypercapnia ([address] [C98.13, J96.22]      Priority: Medium    Abnormal EKG [R94.31]      Priority: Medium    Chronic obstructive pulmonary disease (HCC) [J44.9]      Priority: Medium    COPD, severe ([address] [J44.9]     Chronic pain syndrome [G89.4]     Hypercholesteremia [E78.00]     Fibromyalgia [M79.7]      Acute on chronic respiratory failure with hypoxia and hypercapnia (HCC) -the etiology of the acute aspect of her respiratory failure is unclear at this time. There is some concern that her Pseudomonas pneumonia was not fully treated. Chest x-ray does show some Consolidation in the right lung. She has been placed on broad-spectrum antibiotics and we will check a procalcitonin level.  -We will provide oxygen as necessary to maintain an oxygen saturation of 92% or higher     Critical coronary artery disease single-vessel  Intervention planned for tomorrow  On heparin drip and antiplatelet therapy     COPD, severe ([facility] [address] - without acute exacerbation. Will provide Nebulizer treatments as needed and continue home medications. Patient will be monitored closely, and deep breathing and coughing will be encouraged while awake. Hyperlipidemia - No current evidence of Rhabdomyolysis or other adverse effects. Continue statin therapy while in the hospital     Chronic pain syndrome -continue her home pain med regimen     Fibromyalgia - provide supportive care       DVT Prophylaxis: heparin  Diet: ADULT DIET; Regular;  Low Sodium (2 gm)  ADULT ORAL NUTRITION SUPPLEMENT; Breakfast, Dinner; Standard High Calorie/High Protein Oral Supplement  Code Status: Full Code    PT/OT Eval Status: requested    Dispo - return to NH when medically stable likely 3 to 4 days    Ahmet Garcia MD

## 2022-07-20 NOTE — PROGRESS NOTES
Physical Therapy  Facility/Department: [address] MED SURG  Physical Therapy Initial Assessment  If patient discharges prior to next session this note will serve as a discharge summary. Please see below for the latest assessment towards goals. Name: Ericka Jernigan  :   MRN: [id number]  Date of Service: 2022    Discharge Recommendations:  Patient would benefit from continued therapy after discharge (3-5sessions/week)   Ericka Jernigan scored a 15/24 on the AM-PAC short mobility form. Current research shows that an AM-PAC score of 17 or less is typically not associated with a discharge to the patient's home setting. Based on the patient's AM-PAC score and their current functional mobility deficits, it is recommended that the patient have 3-5 sessions per week of Physical Therapy at d/c to increase the patient's independence. Please see assessment section for further patient specific details. PT Equipment Recommendations  Equipment Needed: No  Other: defer to next level of care      Patient Diagnosis(es): The primary encounter diagnosis was NSTEMI (non-ST elevated myocardial infarction) ([address] Diagnoses of Pneumonia of right lung due to infectious organism, unspecified part of lung and Hypoxia were also pertinent to this visit. Past Medical History:  has a past medical history of Chronic pain syndrome, Colorectal polyps, COPD (chronic obstructive pulmonary disease) (HCC), CTS (carpal tunnel syndrome), DDD (degenerative disc disease), lumbar, Depression, Family hx of colon cancer, Fibromyalgia, Hyperlipemia, IBS (irritable bowel syndrome), Lumbar spondylosis, New onset type 2 diabetes mellitus ([address] and Urticaria, chronic. Past Surgical History:  has a past surgical history that includes Tympanostomy tube placement; Cervical polyp removal (2004); Carpal tunnel release (Bilateral); Tubal ligation;  Intracapsular cataract extraction (Left, 2020); and Intracapsular cataract extraction (Right, 7/14/2020). Assessment   Assessment: The pt is a 77 yo female who presented to the ED with 2-3 day h/o of worsening SOB. She was recently treated for pna and was having rehab at [facility]. In the ED she had an abd EKG and cardiology consulted and had a LHC on 7- for a NSTEMI. Troponins elevated 0.70. The pt is from home with boyfriend and prior to having pna was indep in mobility and self-care. The pt has been receiving rehab following dx of pna and has been getting therapy at the [facility]. PMHx: chronic pain syndrome, COPD on home O2, lmb DDD, depression, fibromyalgia, DM       Today, the pt demonstrated that she is functioning well below her baseline and is limited by her need for supplemental O2 and decreasing SpO2 levels with activity; she is generally weak and has severely decreased activity tolerance and will benefit from con't skilled PT at d/c. At this time, she would be unsafe for home. Will con't to follow while on the acute care floor. Therapy Prognosis: Good  Decision Making: Medium Complexity  Barriers to Learning: fatigue  Requires PT Follow-Up: Yes  Activity Tolerance  Activity Tolerance: Patient limited by endurance; Patient limited by fatigue  Activity Tolerance Comments: SpO2 levels on 4L O2 at rest 92% and with walking 88%     Plan   Plan  Plan: 3-5 times per week  Current Treatment Recommendations: Strengthening, ROM, Balance training, Functional mobility training, Transfer training, Gait training, Safety education & training, Patient/Caregiver education & training, Equipment evaluation, education, & procurement, Therapeutic activities  Safety Devices  Type of Devices: Call light within reach, Chair alarm in place, Gait belt, Left in chair, Nurse notified     Restrictions  Restrictions/Precautions  Restrictions/Precautions: Fall Risk  Position Activity Restriction  Other position/activity restrictions: 4L O2     Subjective   General  Chart Reviewed:  Yes  Additional Pertinent Hx: Per Cole Lundberg MD H&P on 7-: The pt is a 79 yo female who presented to the ED with 2-3 day h/o of worsening SOB. She was recently treated for pna and was having rehab at [facility]. In the ED she had an abd EKG and cardiology consulted and had a LHC on 7- for a NSTEMI. Troponins elevated 0.70. PMHx: chronic pain syndrome, COPD on home O2, lmb DDD, depression, fibromyalgia, DM  Response To Previous Treatment: Not applicable  Family / Caregiver Present: No  Referring Practitioner: Jorge Armstrong MD  Referral Date : 07/19/22  Diagnosis: Acute on chronic respiratory failure with hypoxia and hypercapnia, NSTEMI  Follows Commands: Within Functional Limits  Subjective  Subjective: the pt was found to be up in the chair; she was awake and pleasant and agreeable to therapy although appears fatigued; reports pain in her chest with coughing and some dizziness when standing         Social/Functional History  Social/Functional History  Lives With: Significant other (Ray)  Type of Home: Apartment (first floor)  Home Layout: One level (laundry in apt)  Home Access: Level entry  Bathroom Shower/Tub: Tub/Shower unit  Bathroom Toilet: Standard  Bathroom Equipment: Shower chair  Home Equipment: Cane  ADL Assistance: [facility] [address]: Independent (shared with significant other)  Ambulation Assistance: Independent (without AD)  Transfer Assistance: Independent  Active : No  Patient's  Info: significant other drives  Occupation: Retired  Additional Comments: Pt recent admit with COPD Exac, ROLY, UTI and Sepsis and d/c'd on 7/13/2022 to Home at [facility] for rehab.   Vision/Hearing  Vision  Vision: Impaired  Vision Exceptions: Wears glasses for reading  Hearing  Hearing: Within functional limits    Cognition   Orientation  Overall Orientation Status: Within Functional Limits  Orientation Level: Oriented to person;Oriented to situation;Oriented to place;Oriented to time  Cognition  Overall Cognitive Status: WFL     Objective     Gross Assessment  AROM: Within functional limits  Strength: Generally decreased, functional  Tone: Normal  Sensation: Impaired (reports she has some numbness in B toes sometimes)     Bed mobility  Bed Mobility Comments: n/a this session: the pt already up in the chair and remained up in the chair at end of session  Transfers  Sit to Stand: Contact guard assistance  Stand to sit: Contact guard assistance  Comment: cues for hand placement and appears to take increased effort on the pt's part when going to stand  Ambulation  Surface: level tile  Device: Rolling Walker  Other Apparatus: O2 (IV pole and O2 line managed by therapist)  Assistance: Minimal assistance  Quality of Gait: very slow effortful pace; tends to walk beind the frame of the walker; shortened B steps with decreased step clearance  Distance: 25 feet x 2  Comments: SpO2 levels on 4L O2 at rest 92% and with walking 88%     Balance  Sitting - Static: Good  Sitting - Dynamic: Good  Standing - Static: Good; - (with walker)  Standing - Dynamic: Fair (with walker)  Comments: CGA for balance while standing at the commode for clothing management             AM-PAC Score  AM-PAC Inpatient Mobility Raw Score : 15 (07/20/22 1017)  AM-PAC Inpatient T-Scale Score : 39.45 (07/20/22 1017)  Mobility Inpatient CMS 0-100% Score: 57.7 (07/20/22 1017)  Mobility Inpatient CMS G-Code Modifier : CK (07/20/22 1017)          Goals  Short Term Goals  Time Frame for Short term goals: upon d/c  Short term goal 1: Bed mobility with sup/mod I. Short term goal 2: Transfers sit <> stand with SBA. Short term goal 3: Ambulate with wh walker 50 feet with CGA.   Short term goal 4: SpO2 levels to stay >90% with activity  Patient Goals   Patient goals : to go home       Education  Patient Education  Education Given To: Patient  Education Provided: Role of Therapy;Plan of Care;Energy Conservation;Transfer Training;Equipment  Education Method: Demonstration;Verbal  Barriers to Learning: Other (Comment) (fatigue)  Education Outcome: Verbalized understanding;Continued education needed      Therapy Time   Individual Concurrent Group Co-treatment   Time In 0938         Time Out 1020         Minutes 42         Timed Code Treatment Minutes: 27 Minutes     Electronically signed by Edith Jara, PT [id number] on 7/20/2022 at 10:26 AM

## 2022-07-20 NOTE — PROGRESS NOTES
Occupational Therapy  Facility/Department: [facility]  Occupational Therapy Initial Assessment    Name: Юлия Enciso  : 1945  MRN: [id number]  Date of Service: 2022    Discharge Recommendations:  3-5 sessions per week, Patient would benefit from continued therapy after discharge      Юлия Enciso scored a 17/24 on the AM-PAC ADL Inpatient form. Current research shows that an AM-PAC score of 17 or less is typically not associated with a discharge to the patient's home setting. Based on the patient's AM-PAC score and their current ADL deficits, it is recommended that the patient have 3-5 sessions per week of Occupational Therapy at d/c to increase the patient's independence. Please see assessment section for further patient specific details. If patient discharges prior to next session this note will serve as a discharge summary. Please see below for the latest assessment towards goals. Patient Diagnosis(es): The primary encounter diagnosis was NSTEMI (non-ST elevated myocardial infarction) ([facility] [address] Diagnoses of Pneumonia of right lung due to infectious organism, unspecified part of lung and Hypoxia were also pertinent to this visit. Past Medical History:  has a past medical history of Chronic pain syndrome, Colorectal polyps, COPD (chronic obstructive pulmonary disease) (HCC), CTS (carpal tunnel syndrome), DDD (degenerative disc disease), lumbar, Depression, Family hx of colon cancer, Fibromyalgia, Hyperlipemia, IBS (irritable bowel syndrome), Lumbar spondylosis, New onset type 2 diabetes mellitus ([address] and Urticaria, chronic. Past Surgical History:  has a past surgical history that includes Tympanostomy tube placement; Cervical polyp removal (2004); Carpal tunnel release (Bilateral); Tubal ligation; Intracapsular cataract extraction (Left, 2020); and Intracapsular cataract extraction (Right, 2020).            Assessment   Performance deficits / Impairments: Decreased functional mobility ; Decreased ADL status; Decreased ROM; Decreased strength;Decreased endurance;Decreased balance;Decreased high-level IADLs  Assessment: Pt is a 78 y.o. female admitted from SNF with Acute on chronic respiratory failure with hypoxia and hypercapnia, NSTEMI. Pt recent admit with COPD Exac, ROLY, UTI and Sepsis and d/c'd on 7/13/2022 to Home at [facility] for rehab. At baseline, pt lives with significant other in apartment setting and independent ADLs, IADLs, and fxl mobility. Pt currently functioning below baseline d/t the above deficits, today requiring CGA fxl transfers, CGA/min A fxl mobility with RW, min A toileting, and anticipate pt would require overall min/mod A for ADLs. Pt easily fatigued with minimal activity, SpO2 on 4 L O2 92% at rest and 88% with exertion. Pt demonstrates need for ongoing skilled OT at d/c to maximize pt's safety and independence prior to return home. Prognosis: Good  Decision Making: Medium Complexity  History: see above  REQUIRES OT FOLLOW-UP: Yes  Activity Tolerance  Activity Tolerance: Patient limited by fatigue  Activity Tolerance Comments: SpO2 on 4 L O2 92% at rest, 88% with exertion        Plan   Plan  Times per Week: 3-5  Current Treatment Recommendations: Strengthening, ROM, Balance training, Functional mobility training, Endurance training, Safety education & training, Self-Care / ADL     Restrictions  Restrictions/Precautions  Restrictions/Precautions: Fall Risk  Position Activity Restriction  Other position/activity restrictions: 4L O2    Subjective   General  Chart Reviewed: Yes  Additional Pertinent Hx: Per Dr. Cerna Erps H&P: \"Pt is an 78y.o. year-old female with a history of hyperlipidemia, fibromyalgia, chronic pain syndrome, severe COPD with chronic hypoxic respiratory failure requiring oxygen at 4 L/min via nasal cannula continuously.   She presents to the emergency room for evaluation following a 2 to 3-day history of worsening shortness of walker too far out in front requiring VC's for technique/walker management. Pt easily fatigued.)  Assistive Device: Walker, rolling    Transfers  Sit to stand: Contact guard assistance  Stand to sit: Contact guard assistance  Transfer Comments: to/from RW, VC's for hand placement  Toilet Transfers  Toilet - Technique: Ambulating  Equipment Used: Grab bars  Toilet Transfer: Contact guard assistance    AROM: Generally decreased, functional  Strength: Generally decreased, functional    ADL  Grooming Skilled Clinical Factors: pt unable to tolerate standing handwashing at sink dt fatigue, used bathing wipe with set-up  Toileting: Minimal assistance  Toileting Skilled Clinical Factors: to void urine at commode. Assist to manage briefs down, CGA to manage up, pericare in seated SBA  Additional Comments: Anticipate pt is independent feeding, mod A bathing and dressing based on ROM/strength, balance, endurance. Activity Tolerance  Activity Tolerance: Patient limited by endurance; Patient limited by fatigue  Activity Tolerance Comments: SpO2 levels on 4L O2 at rest 92% and with walking 88%    Bed mobility  Bed Mobility Comments: n/a this session: the pt already up in the chair and remained up in the chair at end of session    Cognition  Overall Cognitive Status: [facility]  Orientation  Overall Orientation Status: Within Functional Limits  Orientation Level: Oriented to person;Oriented to situation;Oriented to place;Oriented to time    Education Given To: Patient  Education Provided: Role of Therapy;Plan of Care;Transfer Training;ADL Adaptive Strategies; Energy Conservation  Barriers to Learning: None  Education Outcome: Verbalized understanding;Demonstrated understanding                        AM-PAC Score        AM-PAC Inpatient Daily Activity Raw Score: 17 (07/20/22 1022)  AM-PAC Inpatient ADL T-Scale Score : 37.26 (07/20/22 1022)  ADL Inpatient CMS 0-100% Score: 50.11 (07/20/22 1022)  ADL Inpatient CMS G-Code Modifier : EMILIANA (07/20/22 1022)    Goals  Short Term Goals  Time Frame for Short term goals: Prior to d/c:  Short Term Goal 1: Pt will bathe with SBA. Short Term Goal 2: Pt will dress with SBA. Short Term Goal 3: Pt will toilet with SBA/supervision. Short Term Goal 4: Pt will complete fxl mobility and fxl transfers to/from ADL surfaces with SBA/supervision using AD. Short Term Goal 5: Pt will tolerate standing >5 minutes for functional task with SBA/supervision while maintaining O2 sats >90% to improve standing tolerance for ADL routine. Long Term Goals  Time Frame for Long term goals : STGs=LTGs  Patient Goals   Patient goals : to finish rehab at eventually return home.        Therapy Time   Individual Concurrent Group Co-treatment   Time In 5088         Time Out 1020         Minutes 42         Timed Code Treatment Minutes: [address], OTR/L 5941

## 2022-07-20 NOTE — PROGRESS NOTES
Clinical Pharmacy Note  Heparin Dosing       Lab Results   Component Value Date/Time    APTT 77.1 07/20/2022 02:22 AM     Lab Results   Component Value Date/Time    HGB 8.5 07/20/2022 02:22 AM    HCT 24.7 07/20/2022 02:22 AM     07/20/2022 02:22 AM       Current Infusion Rate: 1110 units/hr    Plan:  Rate: Continue 1110 units/hr  Next aPTT: 0830  7/20/22    Pharmacy will continue to monitor and adjust based on aPTT results.     Marissa Orr [facility]  7/20/2022 5:05 AM

## 2022-07-20 NOTE — PLAN OF CARE
Problem: Pain  Goal: Verbalizes/displays adequate comfort level or baseline comfort level  7/19/2022 2341 by Santi Marquez RN  Outcome: Progressing  7/19/2022 1007 by Evon Armenta RN  Outcome: Progressing     Problem: Confusion  Goal: Confusion, delirium, dementia, or psychosis is improved or at baseline  Description: INTERVENTIONS:  1. Assess for possible contributors to thought disturbance, including medications, impaired vision or hearing, underlying metabolic abnormalities, dehydration, psychiatric diagnoses, and notify attending LIP  2. [place] high risk fall precautions, as indicated  3. Provide frequent short contacts to provide reality reorientation, refocusing and direction  4. Decrease environmental stimuli, including noise as appropriate  5. Monitor and intervene to maintain adequate nutrition, hydration, elimination, sleep and activity  6. If unable to ensure safety without constant attention obtain sitter and review sitter guidelines with assigned personnel  7. Initiate Psychosocial CNS and Spiritual Care consult, as indicated  7/19/2022 2341 by Santi Marquez RN  Outcome: Progressing  7/19/2022 1007 by vEon Armenta RN  Outcome: Progressing     Problem: Skin/Tissue Integrity  Goal: Absence of new skin breakdown  Description: 1. Monitor for areas of redness and/or skin breakdown  2. Assess vascular access sites hourly  3. Every 4-6 hours minimum:  Change oxygen saturation probe site  4. Every 4-6 hours:  If on nasal continuous positive airway pressure, respiratory therapy assess nares and determine need for appliance change or resting period.   7/19/2022 2341 by Santi Marquez RN  Outcome: Progressing  7/19/2022 1007 by Evon Armenta RN  Outcome: Progressing     Problem: Safety - Adult  Goal: Free from fall injury  7/19/2022 2341 by Santi Marquez RN  Outcome: Progressing  7/19/2022 1007 by Evon Armenta RN  Outcome: Progressing     Problem: ABCDS Injury Assessment  Goal: Absence of physical injury  7/19/2022 2341 by Daija Craig RN  Outcome: Progressing  7/19/2022 1007 by Mindy Fuentes RN  Outcome: Progressing     Problem: Chronic Conditions and Co-morbidities  Goal: Patient's chronic conditions and co-morbidity symptoms are monitored and maintained or improved  Outcome: Progressing

## 2022-07-20 NOTE — PLAN OF CARE
Problem: Pain  Goal: Verbalizes/displays adequate comfort level or baseline comfort level  7/19/2022 2341 by Rebecca Caputo RN  Outcome: Progressing  7/19/2022 1007 by Omar Enriquez RN  Outcome: Progressing     Problem: Confusion  Goal: Confusion, delirium, dementia, or psychosis is improved or at baseline  Description: INTERVENTIONS:  1. Assess for possible contributors to thought disturbance, including medications, impaired vision or hearing, underlying metabolic abnormalities, dehydration, psychiatric diagnoses, and notify attending LIP  2. [place] high risk fall precautions, as indicated  3. Provide frequent short contacts to provide reality reorientation, refocusing and direction  4. Decrease environmental stimuli, including noise as appropriate  5. Monitor and intervene to maintain adequate nutrition, hydration, elimination, sleep and activity  6. If unable to ensure safety without constant attention obtain sitter and review sitter guidelines with assigned personnel  7. Initiate Psychosocial CNS and Spiritual Care consult, as indicated  7/19/2022 2341 by Rebecca Caputo RN  Outcome: Progressing  7/19/2022 1007 by Omar Enriquez RN  Outcome: Progressing     Problem: Skin/Tissue Integrity  Goal: Absence of new skin breakdown  Description: 1. Monitor for areas of redness and/or skin breakdown  2. Assess vascular access sites hourly  3. Every 4-6 hours minimum:  Change oxygen saturation probe site  4. Every 4-6 hours:  If on nasal continuous positive airway pressure, respiratory therapy assess nares and determine need for appliance change or resting period.   7/19/2022 2341 by Rebecca Caputo RN  Outcome: Progressing  7/19/2022 1007 by Omar Enriquez RN  Outcome: Progressing     Problem: Safety - Adult  Goal: Free from fall injury  7/19/2022 2341 by Rebecca Caputo RN  Outcome: Progressing  7/19/2022 1007 by Omar Enriquez RN  Outcome: Progressing     Problem: ABCDS Injury Assessment  Goal: Absence of physical injury  7/19/2022 2341 by Caesar Polo RN  Outcome: Progressing  7/19/2022 1007 by Antione Hernandez RN  Outcome: Progressing     Problem: Chronic Conditions and Co-morbidities  Goal: Patient's chronic conditions and co-morbidity symptoms are monitored and maintained or improved  Outcome: Progressing

## 2022-07-20 NOTE — PROGRESS NOTES
Comprehensive Nutrition Assessment    Type and Reason for Visit:  Initial, Positive Nutrition Screen    Nutrition Recommendations/Plan:   Continue low sodium diet  Add Ensure Enlive bid  Monitor meal and supplement intake       Malnutrition Assessment:  Malnutrition Status: At risk for malnutrition (Comment) (07/20/22 1218)    Context:  Chronic Illness     Findings of the 6 clinical characteristics of malnutrition:  Energy Intake:  Mild decrease in energy intake (Comment)  Weight Loss:  No significant weight loss     Body Fat Loss:  Mild body fat loss Orbital   Muscle Mass Loss:  No significant muscle mass loss    Fluid Accumulation:  Mild Extremities   Strength:  Not Performed    Nutrition Assessment:    +nutiriton screen MST2. Pt admitted with COPD  with PMH of HLD and DVT. Pt recently dc from 2 week hospital stay for pneumonia to [facility] for rehab. Pt has had wt loss of 6#(4%) in past two months. Pt states her intake has been limited since then as she has had mouth sores throughout that time. Pt has been taking Ensure bid. Will start while here. Nutrition Related Findings:    BLE trace edema, no BM recorded Wound Type: None       Current Nutrition Intake & Therapies:    Average Meal Intake: Unable to assess  Average Supplements Intake: None Ordered  ADULT DIET; Regular; Low Sodium (2 gm)    Anthropometric Measures:  Height: 5' 4\" (162.6 cm)  Ideal Body Weight (IBW): 120 lbs (55 kg)    Admission Body Weight: 135 lb 9.3 oz (61.5 kg)  Current Body Weight: 133 lb 2.5 oz (60.4 kg), 111 % IBW.  Weight Source: Standing Scale  Current BMI (kg/m2): 22.8  Usual Body Weight: 139 lb (63 kg)  % Weight Change (Calculated): -4.2     BMI Categories: Normal Weight (BMI 22.0 to 24.9) age over 72    Estimated Daily Nutrient Needs:        Energy (kcal/day): 8939-9187 (25-30 x CBW 60)     Protein (g/day): 60- 72 (1-1.2 x CBW 60)     Fluid (ml/day): per provider    Nutrition Diagnosis:   Inadequate oral intake related to other (comment) (mouth sores) as evidenced by poor intake prior to admission    Nutrition Interventions:   Food and/or Nutrient Delivery: Continue Current Diet, Start Oral Nutrition Supplement  Nutrition Education/Counseling: Education not indicated  Coordination of Nutrition Care: Continue to monitor while inpatient       Goals:     Goals: PO intake 50% or greater       Nutrition Monitoring and Evaluation:   Behavioral-Environmental Outcomes: None Identified  Food/Nutrient Intake Outcomes: Food and Nutrient Intake, Supplement Intake  Physical Signs/Symptoms Outcomes: Biochemical Data, Fluid Status or Edema, Nutrition Focused Physical Findings, Skin, Weight    Discharge Planning:     Too soon to determine     Maryan Byrd, [address] [place] [address],   Contact: [phone]

## 2022-07-21 ENCOUNTER — APPOINTMENT (OUTPATIENT)
Dept: CARDIAC CATH/INVASIVE PROCEDURES | Age: 77
DRG: 246 | End: 2022-07-21
Payer: MEDICARE

## 2022-07-21 LAB
ANION GAP SERPL CALCULATED.3IONS-SCNC: 8 MMOL/L (ref 3–16)
APTT: 70.9 SEC (ref 23–34.3)
BASOPHILS ABSOLUTE: 0 K/UL (ref 0–0.2)
BASOPHILS RELATIVE PERCENT: 0.4 %
BUN BLDV-MCNC: 12 MG/DL (ref 7–20)
CALCIUM SERPL-MCNC: 7.4 MG/DL (ref 8.3–10.6)
CHLORIDE BLD-SCNC: 96 MMOL/L (ref 99–110)
CO2: 27 MMOL/L (ref 21–32)
CREAT SERPL-MCNC: 0.7 MG/DL (ref 0.6–1.2)
EOSINOPHILS ABSOLUTE: 0.2 K/UL (ref 0–0.6)
EOSINOPHILS RELATIVE PERCENT: 4 %
GFR AFRICAN AMERICAN: >60
GFR NON-AFRICAN AMERICAN: >60
GLUCOSE BLD-MCNC: 104 MG/DL (ref 70–99)
GLUCOSE BLD-MCNC: 116 MG/DL (ref 70–99)
GLUCOSE BLD-MCNC: 141 MG/DL (ref 70–99)
GLUCOSE BLD-MCNC: 92 MG/DL (ref 70–99)
GLUCOSE BLD-MCNC: 98 MG/DL (ref 70–99)
HCT VFR BLD CALC: 26.8 % (ref 36–48)
HEMOGLOBIN: 9.1 G/DL (ref 12–16)
L. PNEUMOPHILA SEROGP 1 UR AG: NORMAL
LYMPHOCYTES ABSOLUTE: 0.7 K/UL (ref 1–5.1)
LYMPHOCYTES RELATIVE PERCENT: 11.3 %
MCH RBC QN AUTO: 31.9 PG (ref 26–34)
MCHC RBC AUTO-ENTMCNC: 34 G/DL (ref 31–36)
MCV RBC AUTO: 93.9 FL (ref 80–100)
MONOCYTES ABSOLUTE: 0.5 K/UL (ref 0–1.3)
MONOCYTES RELATIVE PERCENT: 8.2 %
NEUTROPHILS ABSOLUTE: 4.7 K/UL (ref 1.7–7.7)
NEUTROPHILS RELATIVE PERCENT: 76.1 %
PDW BLD-RTO: 14.2 % (ref 12.4–15.4)
PERFORMED ON: ABNORMAL
PERFORMED ON: NORMAL
PLATELET # BLD: 290 K/UL (ref 135–450)
PMV BLD AUTO: 7.2 FL (ref 5–10.5)
POTASSIUM REFLEX MAGNESIUM: 3.8 MMOL/L (ref 3.5–5.1)
RBC # BLD: 2.85 M/UL (ref 4–5.2)
SODIUM BLD-SCNC: 131 MMOL/L (ref 136–145)
STREP PNEUMONIAE ANTIGEN, URINE: NORMAL
WBC # BLD: 6.1 K/UL (ref 4–11)

## 2022-07-21 PROCEDURE — 6360000002 HC RX W HCPCS: Performed by: INTERNAL MEDICINE

## 2022-07-21 PROCEDURE — 6370000000 HC RX 637 (ALT 250 FOR IP): Performed by: INTERNAL MEDICINE

## 2022-07-21 PROCEDURE — C1887 CATHETER, GUIDING: HCPCS

## 2022-07-21 PROCEDURE — 2500000003 HC RX 250 WO HCPCS: Performed by: INTERNAL MEDICINE

## 2022-07-21 PROCEDURE — 93005 ELECTROCARDIOGRAM TRACING: CPT | Performed by: INTERNAL MEDICINE

## 2022-07-21 PROCEDURE — 99152 MOD SED SAME PHYS/QHP 5/>YRS: CPT

## 2022-07-21 PROCEDURE — 85730 THROMBOPLASTIN TIME PARTIAL: CPT

## 2022-07-21 PROCEDURE — 2709999900 HC NON-CHARGEABLE SUPPLY

## 2022-07-21 PROCEDURE — 6370000000 HC RX 637 (ALT 250 FOR IP)

## 2022-07-21 PROCEDURE — 80048 BASIC METABOLIC PNL TOTAL CA: CPT

## 2022-07-21 PROCEDURE — C9600 PERC DRUG-EL COR STENT SING: HCPCS

## 2022-07-21 PROCEDURE — 6360000004 HC RX CONTRAST MEDICATION: Performed by: INTERNAL MEDICINE

## 2022-07-21 PROCEDURE — 6360000002 HC RX W HCPCS

## 2022-07-21 PROCEDURE — C1894 INTRO/SHEATH, NON-LASER: HCPCS

## 2022-07-21 PROCEDURE — C1760 CLOSURE DEV, VASC: HCPCS

## 2022-07-21 PROCEDURE — 2060000000 HC ICU INTERMEDIATE R&B

## 2022-07-21 PROCEDURE — 99152 MOD SED SAME PHYS/QHP 5/>YRS: CPT | Performed by: INTERNAL MEDICINE

## 2022-07-21 PROCEDURE — 92978 ENDOLUMINL IVUS OCT C 1ST: CPT | Performed by: INTERNAL MEDICINE

## 2022-07-21 PROCEDURE — C1874 STENT, COATED/COV W/DEL SYS: HCPCS

## 2022-07-21 PROCEDURE — 94640 AIRWAY INHALATION TREATMENT: CPT

## 2022-07-21 PROCEDURE — 85025 COMPLETE CBC W/AUTO DIFF WBC: CPT

## 2022-07-21 PROCEDURE — 92978 ENDOLUMINL IVUS OCT C 1ST: CPT

## 2022-07-21 PROCEDURE — 027034Z DILATION OF CORONARY ARTERY, ONE ARTERY WITH DRUG-ELUTING INTRALUMINAL DEVICE, PERCUTANEOUS APPROACH: ICD-10-PCS | Performed by: INTERNAL MEDICINE

## 2022-07-21 PROCEDURE — 99153 MOD SED SAME PHYS/QHP EA: CPT

## 2022-07-21 PROCEDURE — 2500000003 HC RX 250 WO HCPCS

## 2022-07-21 PROCEDURE — 36415 COLL VENOUS BLD VENIPUNCTURE: CPT

## 2022-07-21 PROCEDURE — 2700000000 HC OXYGEN THERAPY PER DAY

## 2022-07-21 PROCEDURE — 2580000003 HC RX 258

## 2022-07-21 PROCEDURE — 94761 N-INVAS EAR/PLS OXIMETRY MLT: CPT

## 2022-07-21 PROCEDURE — C1753 CATH, INTRAVAS ULTRASOUND: HCPCS

## 2022-07-21 PROCEDURE — 2580000003 HC RX 258: Performed by: INTERNAL MEDICINE

## 2022-07-21 PROCEDURE — 92928 PRQ TCAT PLMT NTRAC ST 1 LES: CPT | Performed by: INTERNAL MEDICINE

## 2022-07-21 PROCEDURE — C1725 CATH, TRANSLUMIN NON-LASER: HCPCS

## 2022-07-21 PROCEDURE — C1769 GUIDE WIRE: HCPCS

## 2022-07-21 PROCEDURE — 85347 COAGULATION TIME ACTIVATED: CPT

## 2022-07-21 RX ORDER — ASPIRIN 81 MG/1
81 TABLET ORAL ONCE
Status: COMPLETED | OUTPATIENT
Start: 2022-07-21 | End: 2022-07-21

## 2022-07-21 RX ORDER — SODIUM CHLORIDE 9 MG/ML
INJECTION, SOLUTION INTRAVENOUS PRN
Status: DISCONTINUED | OUTPATIENT
Start: 2022-07-21 | End: 2022-08-11

## 2022-07-21 RX ORDER — HEPARIN SODIUM 1000 [USP'U]/ML
1900 INJECTION, SOLUTION INTRAVENOUS; SUBCUTANEOUS ONCE
Status: COMPLETED | OUTPATIENT
Start: 2022-07-21 | End: 2022-07-21

## 2022-07-21 RX ORDER — SODIUM CHLORIDE 9 MG/ML
INJECTION, SOLUTION INTRAVENOUS PRN
Status: DISCONTINUED | OUTPATIENT
Start: 2022-07-21 | End: 2022-07-27 | Stop reason: SDUPTHER

## 2022-07-21 RX ORDER — SODIUM CHLORIDE 0.9 % (FLUSH) 0.9 %
5-40 SYRINGE (ML) INJECTION PRN
Status: DISCONTINUED | OUTPATIENT
Start: 2022-07-21 | End: 2022-07-22 | Stop reason: SDUPTHER

## 2022-07-21 RX ORDER — SODIUM CHLORIDE 0.9 % (FLUSH) 0.9 %
5-40 SYRINGE (ML) INJECTION EVERY 12 HOURS SCHEDULED
Status: DISCONTINUED | OUTPATIENT
Start: 2022-07-21 | End: 2022-07-22 | Stop reason: SDUPTHER

## 2022-07-21 RX ORDER — PRASUGREL 10 MG/1
10 TABLET, FILM COATED ORAL DAILY
Status: DISCONTINUED | OUTPATIENT
Start: 2022-07-21 | End: 2022-08-10

## 2022-07-21 RX ORDER — OXYCODONE HYDROCHLORIDE 5 MG/1
5 TABLET ORAL EVERY 6 HOURS PRN
Status: DISCONTINUED | OUTPATIENT
Start: 2022-07-21 | End: 2022-08-03

## 2022-07-21 RX ORDER — SODIUM CHLORIDE 0.9 % (FLUSH) 0.9 %
5-40 SYRINGE (ML) INJECTION PRN
Status: DISCONTINUED | OUTPATIENT
Start: 2022-07-21 | End: 2022-08-16 | Stop reason: HOSPADM

## 2022-07-21 RX ORDER — 0.9 % SODIUM CHLORIDE 0.9 %
250 INTRAVENOUS SOLUTION INTRAVENOUS ONCE
Status: COMPLETED | OUTPATIENT
Start: 2022-07-22 | End: 2022-07-22

## 2022-07-21 RX ORDER — ACETAMINOPHEN 325 MG/1
650 TABLET ORAL EVERY 4 HOURS PRN
Status: DISCONTINUED | OUTPATIENT
Start: 2022-07-21 | End: 2022-08-16 | Stop reason: HOSPADM

## 2022-07-21 RX ORDER — SODIUM CHLORIDE 0.9 % (FLUSH) 0.9 %
5-40 SYRINGE (ML) INJECTION EVERY 12 HOURS SCHEDULED
Status: DISCONTINUED | OUTPATIENT
Start: 2022-07-21 | End: 2022-08-16 | Stop reason: HOSPADM

## 2022-07-21 RX ADMIN — SODIUM CHLORIDE: 9 INJECTION, SOLUTION INTRAVENOUS at 21:27

## 2022-07-21 RX ADMIN — HEPARIN SODIUM 1110 UNITS/HR: 10000 INJECTION, SOLUTION INTRAVENOUS at 05:21

## 2022-07-21 RX ADMIN — TIZANIDINE 4 MG: 4 TABLET ORAL at 21:29

## 2022-07-21 RX ADMIN — CEFEPIME 2000 MG: 2 INJECTION, POWDER, FOR SOLUTION INTRAVENOUS at 06:26

## 2022-07-21 RX ADMIN — ATORVASTATIN CALCIUM 40 MG: 40 TABLET, FILM COATED ORAL at 21:29

## 2022-07-21 RX ADMIN — GABAPENTIN 600 MG: 300 CAPSULE ORAL at 21:29

## 2022-07-21 RX ADMIN — HYDROCODONE BITARTRATE AND ACETAMINOPHEN 1 TABLET: 5; 325 TABLET ORAL at 17:17

## 2022-07-21 RX ADMIN — INSULIN LISPRO 1 UNITS: 100 INJECTION, SOLUTION INTRAVENOUS; SUBCUTANEOUS at 21:34

## 2022-07-21 RX ADMIN — CEFEPIME 2000 MG: 2 INJECTION, POWDER, FOR SOLUTION INTRAVENOUS at 21:28

## 2022-07-21 RX ADMIN — HYDROCODONE BITARTRATE AND ACETAMINOPHEN 1 TABLET: 5; 325 TABLET ORAL at 08:37

## 2022-07-21 RX ADMIN — DULOXETINE HYDROCHLORIDE 60 MG: 60 CAPSULE, DELAYED RELEASE ORAL at 21:29

## 2022-07-21 RX ADMIN — AZITHROMYCIN MONOHYDRATE 500 MG: 500 INJECTION, POWDER, LYOPHILIZED, FOR SOLUTION INTRAVENOUS at 08:30

## 2022-07-21 RX ADMIN — TIOTROPIUM BROMIDE INHALATION SPRAY 2 PUFF: 3.12 SPRAY, METERED RESPIRATORY (INHALATION) at 09:23

## 2022-07-21 RX ADMIN — HEPARIN SODIUM 1900 UNITS: 1000 INJECTION INTRAVENOUS; SUBCUTANEOUS at 13:08

## 2022-07-21 RX ADMIN — PANTOPRAZOLE SODIUM 40 MG: 40 TABLET, DELAYED RELEASE ORAL at 08:27

## 2022-07-21 RX ADMIN — MOMETASONE FUROATE AND FORMOTEROL FUMARATE DIHYDRATE 2 PUFF: 200; 5 AEROSOL RESPIRATORY (INHALATION) at 21:16

## 2022-07-21 RX ADMIN — ASPIRIN 81 MG: 81 TABLET, COATED ORAL at 08:27

## 2022-07-21 RX ADMIN — DULOXETINE HYDROCHLORIDE 60 MG: 60 CAPSULE, DELAYED RELEASE ORAL at 08:27

## 2022-07-21 RX ADMIN — MOMETASONE FUROATE AND FORMOTEROL FUMARATE DIHYDRATE 2 PUFF: 200; 5 AEROSOL RESPIRATORY (INHALATION) at 09:23

## 2022-07-21 RX ADMIN — SODIUM CHLORIDE, PRESERVATIVE FREE 10 ML: 5 INJECTION INTRAVENOUS at 21:29

## 2022-07-21 RX ADMIN — GABAPENTIN 600 MG: 300 CAPSULE ORAL at 08:27

## 2022-07-21 RX ADMIN — IOPAMIDOL 90 ML: 755 INJECTION, SOLUTION INTRAVENOUS at 15:20

## 2022-07-21 RX ADMIN — PRASUGREL 10 MG: 10 TABLET, FILM COATED ORAL at 13:05

## 2022-07-21 ASSESSMENT — PAIN DESCRIPTION - DESCRIPTORS
DESCRIPTORS: ACHING
DESCRIPTORS: ACHING

## 2022-07-21 ASSESSMENT — PAIN DESCRIPTION - ORIENTATION
ORIENTATION: ANTERIOR
ORIENTATION: LOWER;RIGHT

## 2022-07-21 ASSESSMENT — PAIN DESCRIPTION - LOCATION
LOCATION: ABDOMEN
LOCATION: ABDOMEN
LOCATION: BACK;SHOULDER

## 2022-07-21 ASSESSMENT — PAIN SCALES - GENERAL
PAINLEVEL_OUTOF10: 9
PAINLEVEL_OUTOF10: 8
PAINLEVEL_OUTOF10: 9

## 2022-07-21 ASSESSMENT — PAIN DESCRIPTION - PAIN TYPE: TYPE: ACUTE PAIN

## 2022-07-21 NOTE — PRE SEDATION
Sedation Pre-Procedure Note    Patient Name: Vern Huang   YOB: 1945  Room/Bed: F3J-9593/5579-69  Medical Record Number: [id number]  Date: 7/21/2022   Time: 3:21 PM       Indication:  nstemi    Consent: I have discussed with the patient and/or the patient representative the indication, alternatives, and the possible risks and/or complications of the planned procedure and the anesthesia methods. The patient and/or patient representative appear to understand and agree to proceed. Vital Signs:   Vitals:    07/21/22 1152   BP: 102/61   Pulse: 95   Resp: 19   Temp: 98.5 °F (36.9 °C)   SpO2: 97%       Past Medical History:   has a past medical history of Chronic pain syndrome, Colorectal polyps, COPD (chronic obstructive pulmonary disease) (HCC), CTS (carpal tunnel syndrome), DDD (degenerative disc disease), lumbar, Depression, Family hx of colon cancer, Fibromyalgia, Hyperlipemia, IBS (irritable bowel syndrome), Lumbar spondylosis, New onset type 2 diabetes mellitus ([facility] [address] and Urticaria, chronic. Past Surgical History:   has a past surgical history that includes Tympanostomy tube placement; Cervical polyp removal (9/2004); Carpal tunnel release (Bilateral); Tubal ligation; Intracapsular cataract extraction (Left, 6/23/2020); and Intracapsular cataract extraction (Right, 7/14/2020).     Medications:   Scheduled Meds:    sodium chloride flush  5-40 mL IntraVENous 2 times per day    prasugrel  10 mg Oral Daily    atorvastatin  40 mg Oral Nightly    sodium chloride flush  5-40 mL IntraVENous 2 times per day    tiZANidine  4 mg Oral Nightly    pantoprazole  40 mg Oral Daily    gabapentin  600 mg Oral BID    DULoxetine  60 mg Oral BID    insulin lispro  0-12 Units SubCUTAneous TID     insulin lispro  0-6 Units SubCUTAneous Nightly    sodium chloride flush  10 mL IntraVENous 2 times per day    cefepime  2,000 mg IntraVENous Q12H    azithromycin  500 mg IntraVENous Q24H    mometasone-formoterol  2 puff Inhalation BID    And    tiotropium  2 puff Inhalation Daily     Continuous Infusions:    sodium chloride      sodium chloride      heparin (PORCINE) Infusion 1,110 Units/hr (07/21/22 0521)    dextrose      sodium chloride       PRN Meds: sodium chloride flush, sodium chloride, HYDROcodone 5 mg - acetaminophen, perflutren lipid microspheres, perflutren lipid microspheres, sodium chloride flush, sodium chloride, acetaminophen, traZODone, ipratropium-albuterol, cetirizine, albuterol sulfate HFA, glucose, dextrose bolus **OR** dextrose bolus, glucagon (rDNA), dextrose, sodium chloride flush, sodium chloride, ondansetron, polyethylene glycol, acetaminophen **OR** acetaminophen  Home Meds:   Prior to Admission medications    Medication Sig Start Date End Date Taking? Authorizing Provider   gabapentin (NEURONTIN) 600 MG tablet Take 1 tablet by mouth 2 times daily for 30 days.  7/13/22 8/12/22  Sayda Mims MD   nystatin (MYCOSTATIN) 236979 UNIT/ML suspension Take 5 mLs by mouth 4 times daily 7/13/22   Sayda Mims MD   benzocaine-menthol (CEPACOL SORE THROAT) 15-3.6 MG lozenge Take 1 lozenge by mouth every 2 hours as needed for Sore Throat 7/13/22   Sayda Mims MD   DULoxetine (CYMBALTA) 60 MG extended release capsule TAKE 1 CAPSULE BY MOUTH TWICE A DAY 6/14/22   HECTOR Samson CNP   pantoprazole (PROTONIX) 40 MG tablet Take 1 tablet by mouth daily 6/7/22   Nusrat Rivas MD   atorvastatin (LIPITOR) 80 MG tablet TAKE 1 TABLET BY MOUTH EVERY DAY FOR CHOLESTEROL 6/3/22   HECTOR Samson CNP   tiZANidine (ZANAFLEX) 4 MG tablet Take 1 tablet by mouth nightly 5/10/22   Nusrat Rivas MD   meloxicam (MOBIC) 15 MG tablet Take 1 tablet by mouth daily 5/10/22   Nusrat Rivas MD   traZODone (DESYREL) 50 MG tablet Take 1-2 tablets by mouth nightly 4/12/22   Nusrat Rivas MD   Fluticasone-Umeclidin-Vilant (TRELEGY ELLIPTA) 200-62.5-25 MCG/INH AEPB Inhale 1 Inhaler into the lungs daily 4/5/22   [facility], HECTOR - CNP   magnesium oxide (MGO) 400 (241.3 Mg) MG TABS tablet Take 1 tablet by mouth 2 times daily as needed (for cramping) 3/15/22   Inez Ulloa MD   alendronate (FOSAMAX) 70 MG tablet TAKE 1 TABLET BY MOUTH ONE TIME PER WEEK 3/8/22   HECTOR Sales CNP   albuterol sulfate  (90 Base) MCG/ACT inhaler Inhale 2 puffs into the lungs every 6 hours as needed for Shortness of Breath 12/29/21   Gama Driver MD   Calcium Citrate-Vitamin D 500-500 MG-UNIT CHEW Take 1 tablet by mouth 2 times daily 9/27/21   NgoziHECTOR Guzman CNP   ONE TOUCH LANCETS MISC 1 each by Does not apply route daily 2/7/20   Ngozi [place], APRN - CNP   ipratropium-albuterol (DUONEB) 0.5-2.5 (3) MG/3ML SOLN nebulizer solution Take 1 aerosol every 4 hours for 2 days and then as needed for shortness of breath coughing and wheezing 9/4/19   Telma Martinez MD   Cholecalciferol (VITAMIN D) 2000 units TABS tablet Take 1 tablet by mouth daily 7/12/19   [facility], APRN - CNP   cetirizine (ZYRTEC) 10 MG tablet Take 10 mg by mouth daily as needed for Allergies    Historical Provider, MD   Nebulizers (COMPRESSOR/NEBULIZER) MISC 1 Units by Does not apply route 4 times daily 3/30/19   Coretta Live MD     Coumadin Use Last 7 Days:  no  Antiplatelet drug therapy use last 7 days: yes   Other anticoagulant use last 7 days: no  Additional Medication Information:  n/a      Pre-Sedation Documentation and Exam:   I have personally completed a history, physical exam & review of systems for this patient (see notes).     Mallampati Airway Assessment:  Mallampati Class I - (soft palate, fauces, uvula & anterior/posterior tonsillar pillars are visible)    Prior History of Anesthesia Complications:   none    ASA Classification:  Class 3 - A patient with severe systemic disease that limits activity but is not incapacitating    Sedation/ Anesthesia Plan:   intravenous sedation    Medications Planned:   midazolam (Versed) intravenously    Patient is an appropriate candidate for plan of sedation: yes    Electronically signed by Daron Newby MD on 7/21/2022 at 3:21 PM

## 2022-07-21 NOTE — PROGRESS NOTES
Hospitalist Progress Note      PCP: HECTOR Pulido - CNP    Date of Admission: 7/18/2022    Chief Complaint:   Chief Complaint   Patient presents with    Shortness of Breath     At [facility] for rehab for pneumonia increased sob over the last few days. Pt on 4-5 lpm via nc all the time. Per EMS the cord was very long ems placed on 6lpm via nc and o2 sat came up to 99%         Hospital Course: Pt is an 78y.o. year-old female with a history of hyperlipidemia, fibromyalgia, chronic pain syndrome, severe COPD with chronic hypoxic respiratory failure requiring oxygen at 4 L/min via nasal cannula continuously. She presents to the emergency room for evaluation following a 2 to 3-day history of worsening shortness of breath. She was recently treated for Pseudomonas pneumonia and discharged to rehab. EMS reports that she was on a very long oxygen tube when they arrived. She was placed on 6 L nasal cannula and her oxygen saturation improved. In the emergency room she was found to have an abnormal EKG with inverted T waves in V2 through V5. Cardiology was consulted and asked that she be put on a heparin drip. Patient denies any current or recent chest pain. She is being admitted for further evaluation and treatment. Associated signs and symptoms do not include chest pain, lightheaded, dizziness, diaphoresis, edema, orthopnea, paroxysmal nocturnal dyspnea, fever or chills. No recent medication changes. 7/19  Feeling better, supplemental O2 5 L    Subjective: as above    Resting comfortably. No chest pain right now.   Feels about the same in terms of her shortness of breath  I got an erroneous call about a low potassium confirmed potassium is within acceptable limits for angiogram      Medications:  Reviewed    Infusion Medications    sodium chloride      sodium chloride      heparin (PORCINE) Infusion 1,110 Units/hr (07/21/22 0521)    dextrose      sodium chloride       Scheduled Medications sodium chloride flush  5-40 mL IntraVENous 2 times per day    prasugrel  10 mg Oral Daily    heparin (porcine)  1,900 Units IntraVENous Once    atorvastatin  40 mg Oral Nightly    sodium chloride flush  5-40 mL IntraVENous 2 times per day    tiZANidine  4 mg Oral Nightly    pantoprazole  40 mg Oral Daily    gabapentin  600 mg Oral BID    DULoxetine  60 mg Oral BID    insulin lispro  0-12 Units SubCUTAneous TID WC    insulin lispro  0-6 Units SubCUTAneous Nightly    sodium chloride flush  10 mL IntraVENous 2 times per day    cefepime  2,000 mg IntraVENous Q12H    azithromycin  500 mg IntraVENous Q24H    mometasone-formoterol  2 puff Inhalation BID    And    tiotropium  2 puff Inhalation Daily     PRN Meds: sodium chloride flush, sodium chloride, HYDROcodone 5 mg - acetaminophen, perflutren lipid microspheres, perflutren lipid microspheres, sodium chloride flush, sodium chloride, acetaminophen, traZODone, ipratropium-albuterol, cetirizine, albuterol sulfate HFA, glucose, dextrose bolus **OR** dextrose bolus, glucagon (rDNA), dextrose, sodium chloride flush, sodium chloride, ondansetron, polyethylene glycol, acetaminophen **OR** acetaminophen      Intake/Output Summary (Last 24 hours) at 7/21/2022 1307  Last data filed at 7/21/2022 0837  Gross per 24 hour   Intake 795.25 ml   Output 850 ml   Net -54.75 ml         Physical Exam Performed:    /61   Pulse 95   Temp 98.5 °F (36.9 °C) (Oral)   Resp 19   Ht 5' 4\" (1.626 m)   Wt 132 lb 11.5 oz (60.2 kg)   SpO2 97%   BMI 22.78 kg/m²     General appearance: No apparent distress, appears stated age and cooperative. HEENT: Pupils equal, round, and reactive to light. Conjunctivae/corneas clear. Neck: Supple, with full range of motion. No jugular venous distention. Trachea midline. Respiratory:  diffuse crackles and wheezing   Cardiovascular: Regular rate and rhythm with normal S1/S2 without murmurs, rubs or gallops.   Abdomen: Soft, non-tender, non-distended with mid   to upper right chest.                 Assessment/Plan:    Active Hospital Problems    Diagnosis     Acute on chronic respiratory failure with hypoxia and hypercapnia (HCC) [J96.21, J96.22]      Priority: Medium    Abnormal EKG [R94.31]      Priority: Medium    Chronic obstructive pulmonary disease (HCC) [J44.9]      Priority: Medium    COPD, severe (HCC) [J44.9]     Chronic pain syndrome [G89.4]     Hypercholesteremia [E78.00]     Fibromyalgia [M79.7]      Acute on chronic respiratory failure with hypoxia and hypercapnia (HCC) -the etiology of the acute aspect of her respiratory failure is unclear at this time. There is some concern that her Pseudomonas pneumonia was not fully treated. Chest x-ray does show some Consolidation in the right lung. She has been placed on broad-spectrum antibiotics and we will check a procalcitonin level.  -We will provide oxygen as necessary to maintain an oxygen saturation of 92% or higher     Critical coronary artery disease single-vessel  Cardiac catheterization with PCI planned for today     COPD, severe ([address] - without acute exacerbation. Will provide Nebulizer treatments as needed and continue home medications. Patient will be monitored closely, and deep breathing and coughing will be encouraged while awake. Hyperlipidemia - No current evidence of Rhabdomyolysis or other adverse effects.  Continue statin therapy while in the hospital     Chronic pain syndrome -continue her home pain med regimen     Fibromyalgia - provide supportive care       DVT Prophylaxis: heparin  Diet: Diet NPO Exceptions are: Sips of Water with Meds  Code Status: Full Code    PT/OT Eval Status: requested    Dispo - return to NH when medically stable likely 3 to 4 days    Lester Sellers MD

## 2022-07-21 NOTE — PROGRESS NOTES
Cardiology Progress    Clay Range  1945    PCP: HECTOR Corea - CNP  Referring Physician: Dr Tim Light  Reason for Referral: Elevated troponin  Chief Complaint:   Chief Complaint   Patient presents with    Shortness of Breath     At [facility] for rehab for pneumonia increased sob over the last few days. Pt on 4-5 lpm via nc all the time. Per EMS the cord was very long ems placed on 6lpm via nc and o2 sat came up to 99%       Interval history:  Breathing improving   [facility] c/w high grade OM1 lesion     Subjective:      History of Present Illness: The patient is 78 y.o. female with a past medical history significant for COPD on 4 L oxygen at home, HLD, depression,  who presents with SOB. Patient was recently hospitalized during 2 weeks for Pseudomonas pneumonia and was discharged 4 days ago to our facility. Patient shortness of breath did not improve despite antibiotic therapy and was asked to go to the hospital again. EMS placed the patient on 6 L nasal canula on arrival.  On evaluation in the emergency department EKG showed what T wave inversion in V2-5. Aspirin and heparin was started. Patient has not had chest pain since admission. Patient was also started on breathing treatment, Cefepime and azithromycin. On my evaluation patient was on 5 L on oxygen with SpO2 >90%. Patient looked tired and short of breath when she talks. Patient denies any  chest pain, palpitations, syncope, orthopnea.        Past Medical History:   Diagnosis Date    Chronic pain syndrome     Colorectal polyps     COPD (chronic obstructive pulmonary disease) (HCC)     CTS (carpal tunnel syndrome)     BILATERAL    DDD (degenerative disc disease), lumbar     Depression     Family hx of colon cancer     Fibromyalgia     Hyperlipemia     IBS (irritable bowel syndrome)     Lumbar spondylosis     New onset type 2 diabetes mellitus ([facility] [address] 2/3/2020    Urticaria, chronic      Past Surgical History:   Procedure MD Blayne        sodium chloride flush 0.9 % injection 5-40 mL  5-40 mL IntraVENous PRN Alina Johnson MD        0.9 % sodium chloride infusion   IntraVENous PRN Alina Johnson MD        acetaminophen (TYLENOL) tablet 650 mg  650 mg Oral Q4H PRN Alina Johnson MD        heparin 25,000 unit in sodium chloride 0.45% 250 mL (premix) infusion  0-3,000 Units/hr IntraVENous Continuous Roman Wodos MD 11.1 mL/hr at 07/20/22 0629 1,110 Units/hr at 07/20/22 0629    traZODone (DESYREL) tablet 100 mg  100 mg Oral Nightly PRN Roman Woods MD        tiZANidine (ZANAFLEX) tablet 4 mg  4 mg Oral Nightly Roman Woods MD   4 mg at 07/20/22 2126    pantoprazole (PROTONIX) tablet 40 mg  40 mg Oral Daily Roman Woods MD   40 mg at 07/20/22 1019    ipratropium-albuterol (DUONEB) nebulizer solution 1 ampule  1 ampule Inhalation Q4H PRN Romna Woods MD        gabapentin (NEURONTIN) capsule 600 mg  600 mg Oral BID Roman Woods MD   600 mg at 07/20/22 2126    DULoxetine (CYMBALTA) extended release capsule 60 mg  60 mg Oral BID Roman Woods MD   60 mg at 07/20/22 2126    cetirizine (ZYRTEC) tablet 10 mg  10 mg Oral Daily PRN Roman Woods MD   10 mg at 07/20/22 1018    albuterol sulfate HFA (PROVENTIL;VENTOLIN;PROAIR) 108 (90 Base) MCG/ACT inhaler 2 puff  2 puff Inhalation Q6H PRN Roman Woods MD        insulin lispro (HUMALOG) injection vial 0-12 Units  0-12 Units SubCUTAneous TID WC Roman Woods MD        insulin lispro (HUMALOG) injection vial 0-6 Units  0-6 Units SubCUTAneous Nightly Roman Woods MD   1 Units at 07/18/22 2332    glucose chewable tablet 16 g  4 tablet Oral PRN Roman Woods MD        dextrose bolus 10% 125 mL  125 mL IntraVENous PRN Roman Woods MD        Or    dextrose bolus 10% 250 mL  250 mL IntraVENous PRN Roman Woods MD        glucagon (rDNA) injection 1 mg  1 mg IntraMUSCular PRN Roman Woods MD        dextrose 5 % solution  100 mL/hr IntraVENous PRN Dc Gabriela Linda MD        sodium chloride flush 0.9 % injection 10 mL  10 mL IntraVENous 2 times per day Luana Montgomery MD   10 mL at 07/19/22 2033    sodium chloride flush 0.9 % injection 10 mL  10 mL IntraVENous PRN Luana Montgomery MD   10 mL at 07/20/22 1911    0.9 % sodium chloride infusion   IntraVENous PRN Luana Montgomery MD        ondansetron [facility]) injection 4 mg  4 mg IntraVENous Q4H PRN Luana Montgomery MD        polyethylene glycol (GLYCOLAX) packet 17 g  17 g Oral Daily PRN Luana Montgomery MD        acetaminophen (TYLENOL) tablet 650 mg  650 mg Oral Q4H PRN Luana Montgomery MD   650 mg at 07/20/22 1021    Or    acetaminophen (TYLENOL) suppository 650 mg  650 mg Rectal Q4H PRN Luana Montgomery MD        cefepime (MAXIPIME) 2000 mg IVPB extended (mini-bag)  2,000 mg IntraVENous Q12H Luana Montgomery MD 12.5 mL/hr at 07/20/22 1909 2,000 mg at 07/20/22 1909    azithromycin (ZITHROMAX) 500 mg in D5W 250ml addavial  500 mg IntraVENous Q24H Luana Montgomery MD   Stopped at 07/20/22 1603    mometasone-formoterol (DULERA) 200-5 MCG/ACT inhaler 2 puff  2 puff Inhalation BID Shaylee Collazo, RPH   2 puff at 07/20/22 2052    And    tiotropium (SPIRIVA RESPIMAT) 2.5 MCG/ACT inhaler 2 puff  2 puff Inhalation Daily Singh Jim, RP   2 puff at 07/20/22 0908       Review of Systems:  Constitutional: No unanticipated weight loss. There's been no change in energy level, sleep pattern, or activity level. No fevers, chills. Eyes: No visual changes or diplopia. No scleral icterus. ENT: No Headaches, hearing loss or vertigo. No mouth sores or sore throat. Cardiovascular: as reviewed in HPI  Respiratory: No cough or wheezing, no sputum production. No hemoptysis. Gastrointestinal: No abdominal pain, appetite loss, blood in stools. No change in bowel or bladder habits. Genitourinary: No dysuria, trouble voiding, or hematuria. Musculoskeletal:  No gait disturbance, no joint complaints.   Integumentary: No rash or pruritis. Neurological: No headache, diplopia, change in muscle strength, numbness or tingling. Psychiatric: No anxiety or depression. Endocrine: No temperature intolerance. No excessive thirst, fluid intake, or urination. No tremor. Hematologic/Lymphatic: No abnormal bruising or bleeding, blood clots or swollen lymph nodes. Allergic/Immunologic: No nasal congestion or hives. Physical Exam:   BP (!) 92/48   Pulse 79   Temp 99.1 °F (37.3 °C) (Oral)   Resp 18   Ht 5' 4\" (1.626 m)   Wt 133 lb 2.5 oz (60.4 kg)   SpO2 91%   BMI 22.86 kg/m²   Wt Readings from Last 3 Encounters:   07/20/22 133 lb 2.5 oz (60.4 kg)   07/13/22 133 lb 13.1 oz (60.7 kg)   06/20/22 138 lb (62.6 kg)     Constitutional: She is oriented to person, place, and time. She is ill appearing and becomes SOB with speak. In no acute distress. Head: Normocephalic and atraumatic. Pupils equal and round. Neck: Neck supple. No JVP or carotid bruit appreciated. No mass and no thyromegaly present. No lymphadenopathy present. Cardiovascular: Normal rate. Normal heart sounds. Exam reveals no gallop and no friction rub. No murmur heard. Pulmonary/Chest: Effort normal and breath sounds normal. No respiratory distress. Diffuse bilateral ronchi. Abdominal: Soft, non-tender. Bowel sounds are normal. She exhibits no organomegaly, mass or bruit. Extremities: No edema. No cyanosis or clubbing. Pulses are 2+ radial/carotid bilaterally. Neurological: No gross cranial nerve deficit. Coordination normal.   Skin: Skin is warm and dry. There is no rash or diaphoresis. Psychiatric: She has a normal mood and affect.  Her speech is normal and behavior is normal.     Lab Review:   FLP:    Lab Results   Component Value Date/Time    TRIG 146 02/17/2022 10:29 AM    HDL 59 02/17/2022 10:29 AM    LDLCALC 83 02/17/2022 10:29 AM    LDLDIRECT 176 10/12/2012 11:00 AM    LABVLDL 29 02/17/2022 10:29 AM     BUN/Creatinine:    Lab Results   Component Value Date/Time BUN 11 07/20/2022 02:22 AM    CREATININE 0.7 07/20/2022 02:22 AM     PT/INR, TNI, HGB A1C:   Lab Results   Component Value Date/Time    TROPONINI 0.52 (HH) 07/18/2022 10:29 PM    LABA1C 6.0 07/03/2022 08:26 PM      No results found for: CBCAUTODIF    EKG Interpretation: Sinus rhythm, HR 98, no axis deviation, new T wave inversion V2-5. Echo: None    Stress Test: 2006 No ischemia noted.        All above diagnostic testing and laboratory data was independently visualized and reviewed by me (not simply review of report)       Assessment and Plan   1) NSTEMI  - staged PCI OM1 tomorrow   - NPO MN   - asa/effient/statin    Amy Wick MD [address] [facility], Interventional Cardiology, and Peripheral Vascular [address] [facility]   (O): [phone]  (F): [phone]

## 2022-07-21 NOTE — PLAN OF CARE
Problem: Pain  Goal: Verbalizes/displays adequate comfort level or baseline comfort level  Outcome: Progressing     Problem: Confusion  Goal: Confusion, delirium, dementia, or psychosis is improved or at baseline  Description: INTERVENTIONS:  1. Assess for possible contributors to thought disturbance, including medications, impaired vision or hearing, underlying metabolic abnormalities, dehydration, psychiatric diagnoses, and notify attending LIP  2. [place] high risk fall precautions, as indicated  3. Provide frequent short contacts to provide reality reorientation, refocusing and direction  4. Decrease environmental stimuli, including noise as appropriate  5. Monitor and intervene to maintain adequate nutrition, hydration, elimination, sleep and activity  6. If unable to ensure safety without constant attention obtain sitter and review sitter guidelines with assigned personnel  7. Initiate Psychosocial CNS and Spiritual Care consult, as indicated  Outcome: Progressing     Problem: Skin/Tissue Integrity  Goal: Absence of new skin breakdown  Description: 1. Monitor for areas of redness and/or skin breakdown  2. Assess vascular access sites hourly  3. Every 4-6 hours minimum:  Change oxygen saturation probe site  4. Every 4-6 hours:  If on nasal continuous positive airway pressure, respiratory therapy assess nares and determine need for appliance change or resting period.   Outcome: Progressing     Problem: Safety - Adult  Goal: Free from fall injury  Outcome: Progressing     Problem: ABCDS Injury Assessment  Goal: Absence of physical injury  Outcome: Progressing  Flowsheets (Taken 7/20/2022 1728 by Carmella Hunter RN)  Absence of Physical Injury: Implement safety measures based on patient assessment     Problem: Chronic Conditions and Co-morbidities  Goal: Patient's chronic conditions and co-morbidity symptoms are monitored and maintained or improved  Outcome: Progressing  Flowsheets (Taken 7/20/2022 1045 by Barbara Alexis RN)  Care Plan - Patient's Chronic Conditions and Co-Morbidity Symptoms are Monitored and Maintained or Improved: Monitor and assess patient's chronic conditions and comorbid symptoms for stability, deterioration, or improvement     Problem: Nutrition Deficit:  Goal: Optimize nutritional status  7/21/2022 0036 by Denise Soto RN  Outcome: Progressing  7/20/2022 1222 by Jolene Blanc RD, LD  Outcome: Progressing

## 2022-07-21 NOTE — PROGRESS NOTES
Clinical Pharmacy Note  Heparin Dosing       Lab Results   Component Value Date/Time    APTT 70.9 07/21/2022 06:07 AM     Lab Results   Component Value Date/Time    HGB 9.1 07/21/2022 06:07 AM    HCT 26.8 07/21/2022 06:07 AM     07/21/2022 06:07 AM     Current Infusion Rate: 1110 units/hr    Plan:  -Plan:          -Bolus: 1900 units        -Rate: Increase to 1230 units/hr        -Next aPTT: 1630 7/21/22    Pharmacy will continue to monitor and adjust based on aPTT results.     SHAHRAM Daley [facility]  7/21/2022 10:09 AM

## 2022-07-21 NOTE — OP NOTE
[address]   Procedure Note    CLINICAL HISTORY:       Mitchell Feng is a 78 y.o. female with a history of type II diabetes. She was admitted with complaints of dyspnea. Cardiac markers were positive. EKG showed anterior T-wave inversion.     Patient Active Problem List   Diagnosis    Osteopenia-last dexa 2009 repeat 2015 (-2.4 hip)--advised tx    Colon polyp, hyperplastic,-(done 3/11-repeat 3-5 yrs--dr Abdelrahman Al)    Family history of colon cancer    CTS (carpal tunnel syndrome)BILATERAL-s/p surgical repair--resolved     Postmenopausal status-last mammogram 2/15 wnl last pap 12/13--wnl    Nondependent alcohol abuse, in remission    Urticaria, chronic    Tobacco abuse-advised to quit--lung cancer screening neg 5/18--repeat low dose ct 1 yr    Chronic back pain-(djd) saw dr Maureen Elmore afford surgery--seeing dr Laura Pena for pain meds    Fibromyalgia    Hypercholesteremia    Lumbar spondylosis    Vitamin D deficiency--severe--started 50,000 iu 2x/ wk 11/13    Insomnia--on elevil w help    Constipation--on daily miralax    Depression    Chronic pain syndrome    Muscle cramping    Acute on chronic respiratory failure with hypercapnia ([address]    ROLY (acute kidney injury) ([address]    Severe sepsis (HCC)    Gastroesophageal reflux disease    COPD, severe ([address]    Chronic hypoxemic respiratory failure ([address]    Degeneration of lumbar or lumbosacral intervertebral disc    Trochanteric bursitis of right hip    COPD exacerbation ([address]    Diabetes ([address]    Controlled type 2 diabetes mellitus without complication, without long-term current use of insulin ([address]    Age-related osteoporosis without current pathological fracture- started alendronate 9/2021    Pulmonary nodule seen on imaging study    Pneumonia of right upper lobe due to infectious organism    General weakness    Elevated lactic acid level    Community acquired pneumonia of right lung    Chronic obstructive pulmonary disease ([address]    Acute respiratory failure with hypoxia (HCC)    Acute on chronic respiratory failure with hypoxia and hypercapnia (HCC)    Abnormal EKG       Prior to Admission medications    Medication Sig Start Date End Date Taking? Authorizing Provider   gabapentin (NEURONTIN) 600 MG tablet Take 1 tablet by mouth 2 times daily for 30 days.  7/13/22 8/12/22  Feli Ledezma MD   nystatin (MYCOSTATIN) 502276 UNIT/ML suspension Take 5 mLs by mouth 4 times daily 7/13/22   Feli Ledezma MD   benzocaine-menthol (CEPACOL SORE THROAT) 15-3.6 MG lozenge Take 1 lozenge by mouth every 2 hours as needed for Sore Throat 7/13/22   Feli Ledezma MD   DULoxetine (CYMBALTA) 60 MG extended release capsule TAKE 1 CAPSULE BY MOUTH TWICE A DAY 6/14/22   HECTOR Hendrickson CNP   pantoprazole (PROTONIX) 40 MG tablet Take 1 tablet by mouth daily 6/7/22   Bonnee Siemens, MD   atorvastatin (LIPITOR) 80 MG tablet TAKE 1 TABLET BY MOUTH EVERY DAY FOR CHOLESTEROL 6/3/22   HECTOR Hendrickson CNP   tiZANidine (ZANAFLEX) 4 MG tablet Take 1 tablet by mouth nightly 5/10/22   Bonnee Siemens, MD   meloxicam (MOBIC) 15 MG tablet Take 1 tablet by mouth daily 5/10/22   Bonnee Siemens, MD   traZODone (DESYREL) 50 MG tablet Take 1-2 tablets by mouth nightly 4/12/22   Bonnee Siemens, MD   Fluticasone-Umeclidin-Vilant (TRELEGY ELLIPTA) 200-62.5-25 MCG/INH AEPB Inhale 1 Inhaler into the lungs daily 4/5/22   HECTOR Almaraz CNP   magnesium oxide (MGO) 400 (241.3 Mg) MG TABS tablet Take 1 tablet by mouth 2 times daily as needed (for cramping) 3/15/22   Bonnee Siemens, MD   alendronate (FOSAMAX) 70 MG tablet TAKE 1 TABLET BY MOUTH ONE TIME PER WEEK 3/8/22   HECTOR Hendrickson CNP   albuterol sulfate  (90 Base) MCG/ACT inhaler Inhale 2 puffs into the lungs every 6 hours as needed for Shortness of Breath 12/29/21   Roverto Simpson MD   Calcium Citrate-Vitamin D 500-500 MG-UNIT CHEW Take 1 tablet by mouth 2 times daily 9/27/21   HECTOR Hendrickson - CNP   ONE TOUCH LANCETS MISC 1 each by Does not apply route daily 2/7/20   HECTOR Quintana CNP   ipratropium-albuterol (DUONEB) 0.5-2.5 (3) MG/3ML SOLN nebulizer solution Take 1 aerosol every 4 hours for 2 days and then as needed for shortness of breath coughing and wheezing 9/4/19   Sinai Roberto MD   Cholecalciferol (VITAMIN D) 2000 units TABS tablet Take 1 tablet by mouth daily 7/12/19   HECTOR Quintana CNP   cetirizine (ZYRTEC) 10 MG tablet Take 10 mg by mouth daily as needed for Allergies    Historical Provider, MD   Nebulizers (COMPRESSOR/NEBULIZER) MISC 1 Units by Does not apply route 4 times daily 3/30/19   Cecy Greene MD          The risks, benefits, and details of the procedure were explained to the patient. The patient verbalized understanding and wanted to proceed. Informed written consent was obtained. INDICATION:  nstemi    PROCEDURES PERFORMED:   IVUS   PCI     PROCEDURE TECHNIQUE:  The patient was approached from the right femoral artery using a 6  French sheath. CONTRAST:  Total of 90 cc. COMPLICATIONS:  None. At the end of the procedure a perclose device was used for hemostasis. EBL: 5 cc    Moderate Sedation:  Start time: 1439  Stop time: 1509  0.5 mg versed   25 mcg fentanyl   An independent trained observer pushed medications at my direction. We monitored the patient's level of consciousness and vital signs/physiologic status throughout the procedure duration (see start and start times above). ANGIOGRAM/CORONARY ARTERIOGRAM:       The left circumflex artery has mild disease   Om1 99%       INTERVENTION  Guide catheter: XB 3.5 Guide  Runthrough wire used to cross OM1 lesion   Predilation with 3.0 regular balllon   IVUS passed to distal OM, reference diameter 3 mm   Haily 3.0 X 15 mm KIRILL   Post dilation with 3.0 NC balloon to 15 ALEXEI     SUMMARY:   Single vessel CAD   S/p successful IVUS guided PCI OM1 X 1 KIRILL     RECOMMENDATION:      1.  Recommend beta blockade, high potency statin, aspirin and effient for 12 months  2. Referral to cardiac rehab placed  3. Patient has been advised on the importance of regular exercise of at least 20-30 minutes daily. 4. Patient counseled about and offered assistance for smoking cessation   5.  Follow up in 1-2 weeks with cardiology    Teri Mcmillan MD [address] [facility], Interventional Cardiology, and Peripheral Vascular [address] [facility]   (C): [phone]  (O): [phone]

## 2022-07-22 ENCOUNTER — APPOINTMENT (OUTPATIENT)
Dept: GENERAL RADIOLOGY | Age: 77
DRG: 246 | End: 2022-07-22
Payer: MEDICARE

## 2022-07-22 LAB
ANION GAP SERPL CALCULATED.3IONS-SCNC: 8 MMOL/L (ref 3–16)
BACTERIA: ABNORMAL /HPF
BASOPHILS ABSOLUTE: 0 K/UL (ref 0–0.2)
BASOPHILS RELATIVE PERCENT: 0.4 %
BILIRUBIN URINE: NEGATIVE
BLOOD CULTURE, ROUTINE: NORMAL
BLOOD, URINE: NEGATIVE
BUN BLDV-MCNC: 12 MG/DL (ref 7–20)
CALCIUM SERPL-MCNC: 7.5 MG/DL (ref 8.3–10.6)
CHLORIDE BLD-SCNC: 99 MMOL/L (ref 99–110)
CLARITY: CLEAR
CO2: 26 MMOL/L (ref 21–32)
COLOR: YELLOW
CREAT SERPL-MCNC: 0.7 MG/DL (ref 0.6–1.2)
CULTURE, BLOOD 2: NORMAL
EKG ATRIAL RATE: 91 BPM
EKG DIAGNOSIS: NORMAL
EKG P AXIS: 51 DEGREES
EKG P-R INTERVAL: 146 MS
EKG Q-T INTERVAL: 390 MS
EKG QRS DURATION: 84 MS
EKG QTC CALCULATION (BAZETT): 479 MS
EKG R AXIS: 60 DEGREES
EKG T AXIS: 25 DEGREES
EKG VENTRICULAR RATE: 91 BPM
EOSINOPHILS ABSOLUTE: 0.3 K/UL (ref 0–0.6)
EOSINOPHILS RELATIVE PERCENT: 4.2 %
EPITHELIAL CELLS, UA: 1 /HPF (ref 0–5)
GFR AFRICAN AMERICAN: >60
GFR NON-AFRICAN AMERICAN: >60
GLUCOSE BLD-MCNC: 101 MG/DL (ref 70–99)
GLUCOSE BLD-MCNC: 106 MG/DL (ref 70–99)
GLUCOSE BLD-MCNC: 117 MG/DL (ref 70–99)
GLUCOSE BLD-MCNC: 129 MG/DL (ref 70–99)
GLUCOSE BLD-MCNC: 133 MG/DL (ref 70–99)
GLUCOSE URINE: NEGATIVE MG/DL
HCT VFR BLD CALC: 25 % (ref 36–48)
HEMOGLOBIN: 8.6 G/DL (ref 12–16)
HYALINE CASTS: 2 /LPF (ref 0–8)
KETONES, URINE: ABNORMAL MG/DL
LEUKOCYTE ESTERASE, URINE: NEGATIVE
LYMPHOCYTES ABSOLUTE: 0.5 K/UL (ref 1–5.1)
LYMPHOCYTES RELATIVE PERCENT: 7.8 %
MCH RBC QN AUTO: 32.2 PG (ref 26–34)
MCHC RBC AUTO-ENTMCNC: 34.6 G/DL (ref 31–36)
MCV RBC AUTO: 93.3 FL (ref 80–100)
MICROSCOPIC EXAMINATION: YES
MONOCYTES ABSOLUTE: 0.6 K/UL (ref 0–1.3)
MONOCYTES RELATIVE PERCENT: 9.3 %
NEUTROPHILS ABSOLUTE: 5.3 K/UL (ref 1.7–7.7)
NEUTROPHILS RELATIVE PERCENT: 78.3 %
NITRITE, URINE: NEGATIVE
PDW BLD-RTO: 14.1 % (ref 12.4–15.4)
PERFORMED ON: ABNORMAL
PH UA: 6.5 (ref 5–8)
PLATELET # BLD: 287 K/UL (ref 135–450)
PMV BLD AUTO: 7.2 FL (ref 5–10.5)
POC ACT LR: 190 SEC
POC ACT LR: 381 SEC
POTASSIUM REFLEX MAGNESIUM: 3.9 MMOL/L (ref 3.5–5.1)
PROTEIN UA: 30 MG/DL
RBC # BLD: 2.68 M/UL (ref 4–5.2)
RBC UA: 5 /HPF (ref 0–4)
SODIUM BLD-SCNC: 133 MMOL/L (ref 136–145)
SPECIFIC GRAVITY UA: 1.05 (ref 1–1.03)
URINE TYPE: ABNORMAL
UROBILINOGEN, URINE: 0.2 E.U./DL
WBC # BLD: 6.8 K/UL (ref 4–11)
WBC UA: 2 /HPF (ref 0–5)

## 2022-07-22 PROCEDURE — 87186 SC STD MICRODIL/AGAR DIL: CPT

## 2022-07-22 PROCEDURE — 80048 BASIC METABOLIC PNL TOTAL CA: CPT

## 2022-07-22 PROCEDURE — 2580000003 HC RX 258: Performed by: NURSE PRACTITIONER

## 2022-07-22 PROCEDURE — 6370000000 HC RX 637 (ALT 250 FOR IP): Performed by: INTERNAL MEDICINE

## 2022-07-22 PROCEDURE — 97530 THERAPEUTIC ACTIVITIES: CPT

## 2022-07-22 PROCEDURE — 87205 SMEAR GRAM STAIN: CPT

## 2022-07-22 PROCEDURE — 85025 COMPLETE CBC W/AUTO DIFF WBC: CPT

## 2022-07-22 PROCEDURE — 97116 GAIT TRAINING THERAPY: CPT | Performed by: PHYSICAL THERAPIST

## 2022-07-22 PROCEDURE — 99233 SBSQ HOSP IP/OBS HIGH 50: CPT | Performed by: NURSE PRACTITIONER

## 2022-07-22 PROCEDURE — 6370000000 HC RX 637 (ALT 250 FOR IP): Performed by: NURSE PRACTITIONER

## 2022-07-22 PROCEDURE — 71045 X-RAY EXAM CHEST 1 VIEW: CPT

## 2022-07-22 PROCEDURE — 36415 COLL VENOUS BLD VENIPUNCTURE: CPT

## 2022-07-22 PROCEDURE — 87077 CULTURE AEROBIC IDENTIFY: CPT

## 2022-07-22 PROCEDURE — 1200000000 HC SEMI PRIVATE

## 2022-07-22 PROCEDURE — 93010 ELECTROCARDIOGRAM REPORT: CPT | Performed by: INTERNAL MEDICINE

## 2022-07-22 PROCEDURE — 97535 SELF CARE MNGMENT TRAINING: CPT

## 2022-07-22 PROCEDURE — 81001 URINALYSIS AUTO W/SCOPE: CPT

## 2022-07-22 PROCEDURE — 87070 CULTURE OTHR SPECIMN AEROBIC: CPT

## 2022-07-22 PROCEDURE — 94761 N-INVAS EAR/PLS OXIMETRY MLT: CPT

## 2022-07-22 PROCEDURE — 2700000000 HC OXYGEN THERAPY PER DAY

## 2022-07-22 PROCEDURE — 2580000003 HC RX 258: Performed by: INTERNAL MEDICINE

## 2022-07-22 PROCEDURE — 97530 THERAPEUTIC ACTIVITIES: CPT | Performed by: PHYSICAL THERAPIST

## 2022-07-22 PROCEDURE — 6360000002 HC RX W HCPCS: Performed by: INTERNAL MEDICINE

## 2022-07-22 PROCEDURE — 94640 AIRWAY INHALATION TREATMENT: CPT

## 2022-07-22 RX ORDER — ASPIRIN 81 MG/1
81 TABLET ORAL DAILY
Status: DISCONTINUED | OUTPATIENT
Start: 2022-07-22 | End: 2022-08-08 | Stop reason: SDUPTHER

## 2022-07-22 RX ADMIN — ALBUTEROL SULFATE 2 PUFF: 90 AEROSOL, METERED RESPIRATORY (INHALATION) at 08:13

## 2022-07-22 RX ADMIN — HYDROCODONE BITARTRATE AND ACETAMINOPHEN 1 TABLET: 5; 325 TABLET ORAL at 08:17

## 2022-07-22 RX ADMIN — DULOXETINE HYDROCHLORIDE 60 MG: 60 CAPSULE, DELAYED RELEASE ORAL at 08:17

## 2022-07-22 RX ADMIN — CEFEPIME 2000 MG: 2 INJECTION, POWDER, FOR SOLUTION INTRAVENOUS at 06:24

## 2022-07-22 RX ADMIN — TIOTROPIUM BROMIDE INHALATION SPRAY 2 PUFF: 3.12 SPRAY, METERED RESPIRATORY (INHALATION) at 08:13

## 2022-07-22 RX ADMIN — SODIUM CHLORIDE, PRESERVATIVE FREE 10 ML: 5 INJECTION INTRAVENOUS at 08:17

## 2022-07-22 RX ADMIN — DULOXETINE HYDROCHLORIDE 60 MG: 60 CAPSULE, DELAYED RELEASE ORAL at 20:16

## 2022-07-22 RX ADMIN — MOMETASONE FUROATE AND FORMOTEROL FUMARATE DIHYDRATE 2 PUFF: 200; 5 AEROSOL RESPIRATORY (INHALATION) at 08:13

## 2022-07-22 RX ADMIN — SODIUM CHLORIDE, PRESERVATIVE FREE 10 ML: 5 INJECTION INTRAVENOUS at 08:18

## 2022-07-22 RX ADMIN — SODIUM CHLORIDE, PRESERVATIVE FREE 10 ML: 5 INJECTION INTRAVENOUS at 20:29

## 2022-07-22 RX ADMIN — PRASUGREL 10 MG: 10 TABLET, FILM COATED ORAL at 08:17

## 2022-07-22 RX ADMIN — TIZANIDINE 4 MG: 4 TABLET ORAL at 20:16

## 2022-07-22 RX ADMIN — PANTOPRAZOLE SODIUM 40 MG: 40 TABLET, DELAYED RELEASE ORAL at 08:17

## 2022-07-22 RX ADMIN — NYSTATIN 500000 UNITS: 100000 SUSPENSION ORAL at 20:18

## 2022-07-22 RX ADMIN — ATORVASTATIN CALCIUM 40 MG: 40 TABLET, FILM COATED ORAL at 20:16

## 2022-07-22 RX ADMIN — CEFEPIME 2000 MG: 2 INJECTION, POWDER, FOR SOLUTION INTRAVENOUS at 18:23

## 2022-07-22 RX ADMIN — ASPIRIN 81 MG: 81 TABLET, COATED ORAL at 13:38

## 2022-07-22 RX ADMIN — OXYCODONE 5 MG: 5 TABLET ORAL at 20:16

## 2022-07-22 RX ADMIN — Medication 10 ML: at 08:18

## 2022-07-22 RX ADMIN — AZITHROMYCIN MONOHYDRATE 500 MG: 500 INJECTION, POWDER, LYOPHILIZED, FOR SOLUTION INTRAVENOUS at 08:23

## 2022-07-22 RX ADMIN — SODIUM CHLORIDE 250 ML: 9 INJECTION, SOLUTION INTRAVENOUS at 00:03

## 2022-07-22 RX ADMIN — MOMETASONE FUROATE AND FORMOTEROL FUMARATE DIHYDRATE 2 PUFF: 200; 5 AEROSOL RESPIRATORY (INHALATION) at 19:35

## 2022-07-22 RX ADMIN — HYDROCODONE BITARTRATE AND ACETAMINOPHEN 1 TABLET: 5; 325 TABLET ORAL at 14:25

## 2022-07-22 RX ADMIN — GABAPENTIN 600 MG: 300 CAPSULE ORAL at 20:16

## 2022-07-22 RX ADMIN — GABAPENTIN 600 MG: 300 CAPSULE ORAL at 08:17

## 2022-07-22 ASSESSMENT — PAIN DESCRIPTION - ORIENTATION
ORIENTATION: LOWER
ORIENTATION: LOWER
ORIENTATION: UPPER

## 2022-07-22 ASSESSMENT — PAIN SCALES - WONG BAKER: WONGBAKER_NUMERICALRESPONSE: 0

## 2022-07-22 ASSESSMENT — PAIN SCALES - GENERAL
PAINLEVEL_OUTOF10: 9
PAINLEVEL_OUTOF10: 0
PAINLEVEL_OUTOF10: 8
PAINLEVEL_OUTOF10: 5
PAINLEVEL_OUTOF10: 10
PAINLEVEL_OUTOF10: 8

## 2022-07-22 ASSESSMENT — PAIN DESCRIPTION - DESCRIPTORS
DESCRIPTORS: DISCOMFORT;ACHING
DESCRIPTORS: ACHING

## 2022-07-22 ASSESSMENT — PAIN DESCRIPTION - PAIN TYPE
TYPE: CHRONIC PAIN
TYPE: CHRONIC PAIN

## 2022-07-22 ASSESSMENT — PAIN DESCRIPTION - LOCATION
LOCATION: BACK

## 2022-07-22 ASSESSMENT — PAIN - FUNCTIONAL ASSESSMENT
PAIN_FUNCTIONAL_ASSESSMENT: PREVENTS OR INTERFERES SOME ACTIVE ACTIVITIES AND ADLS
PAIN_FUNCTIONAL_ASSESSMENT: ACTIVITIES ARE NOT PREVENTED

## 2022-07-22 ASSESSMENT — PAIN DESCRIPTION - FREQUENCY: FREQUENCY: CONTINUOUS

## 2022-07-22 ASSESSMENT — PAIN DESCRIPTION - ONSET: ONSET: ON-GOING

## 2022-07-22 NOTE — PROGRESS NOTES
Physical Therapy  Facility/Department: [address] PROGRESSIVE CARE  Physical Therapy Treatment Note    Name: Hugh Delgado  :   MRN: [id number]  Date of Service: 2022    Discharge Recommendations:  Patient would benefit from continued therapy after discharge (3-5x/wk)    Hugh Delgado scored a 16/24 on the AM-PAC short mobility form. Current research shows that an AM-PAC score of 17 or less is typically not associated with a discharge to the patient's home setting. Based on the patient's AM-PAC score and their current functional mobility deficits, it is recommended that the patient have 3-5 sessions per week of Physical Therapy at d/c to increase the patient's independence. Please see assessment section for further patient specific details. If patient discharges prior to next session this note will serve as a discharge summary. Please see below for the latest assessment towards goals. Patient Diagnosis(es): The primary encounter diagnosis was NSTEMI (non-ST elevated myocardial infarction) ([facility] [address] Diagnoses of Pneumonia of right lung due to infectious organism, unspecified part of lung and Hypoxia were also pertinent to this visit. Past Medical History:  has a past medical history of Chronic pain syndrome, Colorectal polyps, COPD (chronic obstructive pulmonary disease) (HCC), CTS (carpal tunnel syndrome), DDD (degenerative disc disease), lumbar, Depression, Family hx of colon cancer, Fibromyalgia, Hyperlipemia, IBS (irritable bowel syndrome), Lumbar spondylosis, New onset type 2 diabetes mellitus ([address] and Urticaria, chronic. Past Surgical History:  has a past surgical history that includes Tympanostomy tube placement; Cervical polyp removal (2004); Carpal tunnel release (Bilateral); Tubal ligation; Intracapsular cataract extraction (Left, 2020); and Intracapsular cataract extraction (Right, 2020).     Assessment   Body Structures, Functions, Activity Limitations Requiring Skilled Therapeutic Intervention: Decreased functional mobility   Assessment: The pt is a 77 yo female who presented to the ED with 2-3 day h/o of worsening SOB. She was recently treated for pna and was having rehab at [facility]. In the ED she had an abd EKG and cardiology consulted and had a LHC on 7- for a NSTEMI. Troponins elevated 0.70. The pt is from home with boyfriend and prior to having pna was indep in mobility and self-care. The pt has been receiving rehab following dx of pna and has been getting therapy at the [facility]. PMHx: chronic pain syndrome, COPD on home O2, lmb DDD, depression, fibromyalgia, DM       Today, the pt demonstrated that she is functioning well below her baseline and is limited by her need for supplemental O2 and decreasing SpO2 levels with activity; she is generally weak and has severely decreased activity tolerance and will benefit from con't skilled PT at d/c.  At this time, she would be unsafe for home as she continues to need CGA/min A for mobility tasks and needing 5 liters of O2; pt not yet amb household distances due to early fatigue and breathing status  Therapy Prognosis: Good  Decision Making: Medium Complexity  Requires PT Follow-Up: Yes  Activity Tolerance  Activity Tolerance: Patient limited by endurance  Activity Tolerance Comments: spO2 decreased to 89% with activity on 5 liters O2     Plan   Plan  Plan: 3-5 times per week  Current Treatment Recommendations: Strengthening, ROM, Balance training, Functional mobility training, Transfer training, Gait training, Safety education & training, Patient/Caregiver education & training, Equipment evaluation, education, & procurement, Therapeutic activities  Safety Devices  Type of Devices: Call light within reach, Chair alarm in place, Gait belt, Left in chair, Nurse notified     Restrictions  Restrictions/Precautions  Restrictions/Precautions: Fall Risk  Position Activity Restriction  Other position/activity restrictions: 5L O2     Subjective   General  Chart Reviewed: Yes  Patient assessed for rehabilitation services?: Yes  Additional Pertinent Hx: Per Darin Rubio MD H&P on 7-: The pt is a 79 yo female who presented to the ED with 2-3 day h/o of worsening SOB. She was recently treated for pna and was having rehab at [facility]. In the ED she had an abd EKG and cardiology consulted and had a LHC on 7- for a NSTEMI. Troponins elevated 0.70. PMHx: chronic pain syndrome, COPD on home O2, lmb DDD, depression, fibromyalgia, DM  Response To Previous Treatment: Patient with no complaints from previous session. Family / Caregiver Present: Yes ( and sister in room)  Referring Practitioner: Kavin Andersen MD  Referral Date : 07/19/22  Diagnosis: Acute on chronic respiratory failure with hypoxia and hypercapnia, NSTEMI  Follows Commands: Within Functional Limits  Subjective  Subjective: pt coughing a lot but agreeable to working with therapy         Social/Functional History  Social/Functional History  Lives With: Significant other (Ray)  Type of Home: Apartment (first floor)  Home Layout: One level (laundry in apt)  Home Access: Level entry  Bathroom Shower/Tub: Tub/Shower unit  Bathroom Toilet: Standard  Bathroom Equipment: Shower chair  Home Equipment: Cane  ADL Assistance: [address] [place] [address] Avenue: Independent (shared with significant other)  Ambulation Assistance: Independent (without AD)  Transfer Assistance: Independent  Active : No  Patient's  Info: significant other drives  Occupation: Retired  Additional Comments: Pt recent admit with COPD Exac, ROLY, UTI and Sepsis and d/c'd on 7/13/2022 to Home at [facility] for rehab.   Vision/Hearing  Vision  Vision: Impaired  Vision Exceptions: Wears glasses for reading  Hearing  Hearing: Within functional limits    Cognition   Orientation  Overall Orientation Status: Within Functional Limits  Cognition  Overall Cognitive Status: WFL     Objective   Heart Rate: 90  Heart Rate Source: Monitor  BP: (!) 133/93  BP Location: Right upper arm  BP Method: Automatic  Patient Position: Semi fowlers  MAP (Calculated): 106.33  Resp: 20  SpO2: 96 %  O2 Device: High flow nasal cannula                             Bed mobility  Bed Mobility Comments: n/a this session: the pt already up in the chair and remained up in the chair at end of session  Transfers  Sit to Stand: Contact guard assistance;Minimal Assistance  Stand to sit: Contact guard assistance;Minimal Assistance  Ambulation  Surface: level tile  Device: Rolling Walker  Assistance: Minimal assistance  Quality of Gait: very slow effortful pace; tends to walk beind the frame of the walker; shortened B steps with decreased step clearance; needs assist to maneuver walker  Distance: 25 feet x 2  Comments: therapist managed O2 line and IV lines     Balance  Comments: SBA for sitting balance; CGA for standing with RW           OutComes Score                                                  AM-PAC Score  AM-PAC Inpatient Mobility Raw Score : 16 (07/22/22 1455)  AM-PAC Inpatient T-Scale Score : 40.78 (07/22/22 1455)  Mobility Inpatient CMS 0-100% Score: 54.16 (07/22/22 1455)  Mobility Inpatient CMS G-Code Modifier : CK (07/22/22 1455)          Tinneti Score       Goals  Short Term Goals  Time Frame for Short term goals: upon d/c  Short term goal 1: Bed mobility with sup/mod I. Short term goal 2: Transfers sit <> stand with SBA. Short term goal 3: Ambulate with wh walker 50 feet with CGA.   Short term goal 4: SpO2 levels to stay >90% with activity  Patient Goals   Patient goals : to go home       Education  Patient Education  Education Given To: Patient  Education Provided Comments: reviewed call light and not getting up without assist; reviewed benefits of continued therapy; reviewed walker safety  Education Method: Demonstration;Verbal  Barriers to Learning:  (fatigue)  Education Outcome: Verbalized understanding;Continued education needed      Therapy Time   Individual Concurrent Group Co-treatment   Time In 1420         Time Out 12         Minutes 34                 THANH GERMAIN, SANDHYA   Electronically signed by THANH GERMAIN PT on 7/22/2022 at 2:56 PM

## 2022-07-22 NOTE — PROGRESS NOTES
At 2340 last night, pt's BP dropped to 73/40. Pt denied any chest pain, light headedness, but was lethargic. Perfectserve sent to [facility], 250cc bolus given, see MAR. BP now 116/84. Pt bladder scanned and retaining 912 mls urine and stated that she was unable to void. Terrell ordered and placed. 800 ml output from terrell upon insertion. Around 0300, pt's oxygen decreased to the 80s and oxygen demand went from 4L to 9L. Pt has productive cough with blood tinged sputum. Crackles in lungs. Discussed this with Dr Gilbert Razo on the phone. Stat CXR ordered, awaiting results.

## 2022-07-22 NOTE — PROGRESS NOTES
Hospitalist Progress Note      PCP: Brynn Shaw, APRN - CNP    Date of Admission: 7/18/2022    Chief Complaint:   Chief Complaint   Patient presents with    Shortness of Breath     At [facility] for rehab for pneumonia increased sob over the last few days. Pt on 4-5 lpm via nc all the time. Per EMS the cord was very long ems placed on 6lpm via nc and o2 sat came up to 99%         Hospital Course: Pt is an 78y.o. year-old female with a history of hyperlipidemia, fibromyalgia, chronic pain syndrome, severe COPD with chronic hypoxic respiratory failure requiring oxygen at 4 L/min via nasal cannula continuously. She presents to the emergency room for evaluation following a 2 to 3-day history of worsening shortness of breath. She was recently treated for Pseudomonas pneumonia and discharged to rehab. EMS reports that she was on a very long oxygen tube when they arrived. She was placed on 6 L nasal cannula and her oxygen saturation improved. In the emergency room she was found to have an abnormal EKG with inverted T waves in V2 through V5. Cardiology was consulted and asked that she be put on a heparin drip. Patient denies any current or recent chest pain. She is being admitted for further evaluation and treatment. Associated signs and symptoms do not include chest pain, lightheaded, dizziness, diaphoresis, edema, orthopnea, paroxysmal nocturnal dyspnea, fever or chills. No recent medication changes. 7/19  Feeling better, supplemental O2 5 L    Subjective:    Patient feels okay.   Oxygen requirement did go up  Looks tired and wiped out      Medications:  Reviewed    Infusion Medications    sodium chloride      sodium chloride 10 mL/hr at 07/21/22 2128    dextrose       Scheduled Medications    aspirin  81 mg Oral Daily    prasugrel  10 mg Oral Daily    sodium chloride flush  5-40 mL IntraVENous 2 times per day    atorvastatin  40 mg Oral Nightly    tiZANidine  4 mg Oral Nightly    pantoprazole  40 mg Oral Daily    gabapentin  600 mg Oral BID    DULoxetine  60 mg Oral BID    insulin lispro  0-12 Units SubCUTAneous TID WC    insulin lispro  0-6 Units SubCUTAneous Nightly    cefepime  2,000 mg IntraVENous Q12H    azithromycin  500 mg IntraVENous Q24H    mometasone-formoterol  2 puff Inhalation BID    And    tiotropium  2 puff Inhalation Daily     PRN Meds: sodium chloride, sodium chloride flush, sodium chloride, acetaminophen, oxyCODONE, HYDROcodone 5 mg - acetaminophen, perflutren lipid microspheres, perflutren lipid microspheres, acetaminophen, traZODone, ipratropium-albuterol, cetirizine, albuterol sulfate HFA, glucose, dextrose bolus **OR** dextrose bolus, glucagon (rDNA), dextrose, ondansetron, polyethylene glycol, acetaminophen **OR** acetaminophen      Intake/Output Summary (Last 24 hours) at 7/22/2022 1523  Last data filed at 7/22/2022 1221  Gross per 24 hour   Intake 600 ml   Output 1050 ml   Net -450 ml         Physical Exam Performed:    BP (!) 118/58   Pulse 96   Temp 98 °F (36.7 °C) (Oral)   Resp 18   Ht 5' 4\" (1.626 m)   Wt 132 lb 11.5 oz (60.2 kg)   SpO2 94%   BMI 22.78 kg/m²     General appearance: No apparent distress, appears stated age and cooperative. HEENT: Pupils equal, round, and reactive to light. Conjunctivae/corneas clear. Neck: Supple, with full range of motion. No jugular venous distention. Trachea midline. Respiratory:  diffuse crackles and wheezing worse than 7/21/2022 exam  Cardiovascular: Regular rate and rhythm with normal S1/S2 without murmurs, rubs or gallops. Abdomen: Soft, non-tender, non-distended with normal bowel sounds. Musculoskeletal: No clubbing, cyanosis or edema bilaterally. Full range of motion without deformity. Skin: Skin color, texture, turgor normal.  No rashes or lesions. Neurologic:  Neurovascularly intact without any focal sensory/motor deficits.  Cranial nerves: II-XII intact, grossly non-focal.  Psychiatric: Alert and oriented, thought content appropriate, normal insight  Capillary Refill: Brisk,3 seconds, normal   Peripheral Pulses: +2 palpable, equal bilaterally       Labs:   Recent Labs     07/20/22 0222 07/21/22  0607 07/22/22 0417   WBC 6.5 6.1 6.8   HGB 8.5* 9.1* 8.6*   HCT 24.7* 26.8* 25.0*    290 287       Recent Labs     07/20/22 0222 07/21/22  0607 07/22/22 0417   * 131* 133*   K 4.2 3.8 3.9   CL 98* 96* 99   CO2 32 27 26   BUN 11 12 12   CREATININE 0.7 0.7 0.7   CALCIUM 7.3* 7.4* 7.5*       No results for input(s): AST, ALT, BILIDIR, BILITOT, ALKPHOS in the last 72 hours. No results for input(s): INR in the last 72 hours. No results for input(s): [place] in the last 72 hours. 7/19/2022 cardiac cath     HEMODYNAMICS:  Aortic pressure was 140/80;  LVEDP 16. There was no gradient between the left ventricle and aorta. ANGIOGRAM/CORONARY ARTERIOGRAM:        The left main coronary artery is normal .     The left anterior descending artery has mild diffuse disease . The left circumflex artery is normal              OM1 99% . The right coronary artery is normal .     SUMMARY:   Single vessel CAD      Urinalysis:      Lab Results   Component Value Date/Time    NITRU Negative 07/22/2022 06:31 AM    WBCUA 2 07/22/2022 06:31 AM    BACTERIA None Seen 07/22/2022 06:31 AM    RBCUA 5 07/22/2022 06:31 AM    BLOODU Negative 07/22/2022 06:31 AM    SPECGRAV 1.049 07/22/2022 06:31 AM    GLUCOSEU Negative 07/22/2022 06:31 AM       Radiology:  XR CHEST PORTABLE   Final Result   1. Increased pneumonia throughout the right lung remaining most prominent in   the right upper lobe. 2. Emphysema better demonstrated on CT. 3. Possible trace right pleural effusion.          XR CHEST PORTABLE   Final Result   Improved aeration of the right chest with persistent consolidation in the mid   to upper right chest.                 Assessment/Plan:    Active Hospital Problems    Diagnosis     Acute on chronic respiratory failure with hypoxia and hypercapnia (HCC) [J96.21, J96.22]      Priority: Medium    Abnormal EKG [R94.31]      Priority: Medium    Chronic obstructive pulmonary disease (HCC) [J44.9]      Priority: Medium    COPD, severe ([address] [J44.9]     Chronic pain syndrome [G89.4]     Hypercholesteremia [E78.00]     Fibromyalgia [M79.7]      Acute on chronic respiratory failure with hypoxia and hypercapnia (HCC) -  X-ray looks worse  We will ask pulmonary to see  Continue antibiotics  Send respiratory culture  Critical coronary artery disease single-vessel  Status post PCI to left circumflex yesterday with stent  Now on antiplatelet therapy     COPD, severe ([address] - without acute exacerbation. Will provide Nebulizer treatments as needed and continue home medications. Patient will be monitored closely, and deep breathing and coughing will be encouraged while awake. Hyperlipidemia - No current evidence of Rhabdomyolysis or other adverse effects. Continue statin therapy while in the hospital     Chronic pain syndrome -continue her home pain med regimen     Fibromyalgia - provide supportive care       DVT Prophylaxis: heparin  Diet: ADULT DIET; Regular; Low Sodium (2 gm)  Code Status: Full Code    PT/OT Eval Status: requested    Dispo - return to NH when medically stable likely 3 to 4 days  I did discuss the case with the patient  and I also made a phone call to patient's daughter Gennaro العلي who is a nurse in the [employer] system at [phone].   I did get permission to speak with her from the patient    Reza Daily MD

## 2022-07-22 NOTE — PROGRESS NOTES
Nicky Peng   Daily Progress Note      Admit Date:  7/18/2022    Reason for follow up visit: NSTEMI    CC: \"I am not having any chest pain. I'm starting to feel better. \"    79 y/o female with PMH significant for COPD on chronic O2, HLP, HLP and type II diabetes mellitus admitted to [facility] with SOB. She had been hospitalized recently with PNA. She was discharged on antibiotics and despite this her SOB worsened and EMS called. On evaluation in the ED, ECG showed TW inversions in V2-V5 and ASA and heparin were started. On 7/21/2022 she underwent cardiac cath and had KIRILL placed to OM1. Interval History:  Pt. seen and examined; records reviewed  BP stable. Remains on O2 @ 5L high flow per NC  Denies chest pain  +SOB improving  Hgb 8.6 today    Subjective:  Pt with no acute overnight cardiac events. Denies chest pain, productive cough, palpitations or dizziness  + chronic SOB and on O2    Review of Systems:   Constitutional: no unanticipated weight loss. There's been no change in energy level, sleep pattern, or activity level. No fevers, chills. Eyes: No visual changes or diplopia. No scleral icterus. ENT: No Headaches, hearing loss or vertigo. No mouth sores or sore throat. Cardiovascular: as reviewed in HPI  Respiratory: No cough or wheezing, no sputum production. No hematemesis. Gastrointestinal: No abdominal pain, appetite loss, blood in stools. No change in bowel or bladder habits. Genitourinary: No dysuria, trouble voiding, or hematuria. Musculoskeletal:  No gait disturbance, no joint complaints. Integumentary: No rash or pruritis. Neurological: No headache, diplopia, change in muscle strength, numbness or tingling. Psychiatric: No anxiety or depression. Endocrine: No temperature intolerance. No excessive thirst, fluid intake, or urination. No tremor. Hematologic/Lymphatic: No abnormal bruising or bleeding, blood clots or swollen lymph nodes.   Allergic/Immunologic: No nasal congestion or hives.    Objective:   BP 94/67   Pulse 90   Temp 98.5 °F (36.9 °C) (Oral)   Resp 20   Ht 5' 4\" (1.626 m)   Wt 132 lb 11.5 oz (60.2 kg)   SpO2 96%   BMI 22.78 kg/m²     Intake/Output Summary (Last 24 hours) at 7/22/2022 1208  Last data filed at 7/22/2022 1009  Gross per 24 hour   Intake 600 ml   Output 800 ml   Net -200 ml     Wt Readings from Last 3 Encounters:   07/21/22 132 lb 11.5 oz (60.2 kg)   07/13/22 133 lb 13.1 oz (60.7 kg)   06/20/22 138 lb (62.6 kg)       Physical Exam:  General: Elderly, frail and thin in appearance. Sitting up in chair. Alert and oriented x 4. Remains on O2  Skin:  Warm and dry. Neck:  Supple. No JVD or carotid bruit appreciated. Chest: Lungs with decreased breath sounds bilaterally. No wheezes/rhonchi/rales  Cardiovascular:  RRR. Normal S1 and S2. No murmur/gallop or rub. Abdomen:  soft, nontender, nondistended, +bowel sounds. No hepatomegaly  Extremities:  No LE edema. No clubbing or cyanosis. 2+ bilateral radial/DP/PT pulses. Cap refill brisk. R groin site with small area ecchymosis and without hematoma.      Medications:    sodium chloride flush  5-40 mL IntraVENous 2 times per day    prasugrel  10 mg Oral Daily    sodium chloride flush  5-40 mL IntraVENous 2 times per day    atorvastatin  40 mg Oral Nightly    sodium chloride flush  5-40 mL IntraVENous 2 times per day    tiZANidine  4 mg Oral Nightly    pantoprazole  40 mg Oral Daily    gabapentin  600 mg Oral BID    DULoxetine  60 mg Oral BID    insulin lispro  0-12 Units SubCUTAneous TID WC    insulin lispro  0-6 Units SubCUTAneous Nightly    sodium chloride flush  10 mL IntraVENous 2 times per day    cefepime  2,000 mg IntraVENous Q12H    azithromycin  500 mg IntraVENous Q24H    mometasone-formoterol  2 puff Inhalation BID    And    tiotropium  2 puff Inhalation Daily      sodium chloride      sodium chloride 10 mL/hr at 07/21/22 9396    sodium chloride      dextrose      sodium chloride       sodium chloride flush, sodium chloride, sodium chloride flush, sodium chloride, acetaminophen, oxyCODONE, HYDROcodone 5 mg - acetaminophen, perflutren lipid microspheres, perflutren lipid microspheres, sodium chloride flush, sodium chloride, acetaminophen, traZODone, ipratropium-albuterol, cetirizine, albuterol sulfate HFA, glucose, dextrose bolus **OR** dextrose bolus, glucagon (rDNA), dextrose, sodium chloride flush, sodium chloride, ondansetron, polyethylene glycol, acetaminophen **OR** acetaminophen    Lab Data:  CBC:   Recent Labs     07/20/22 0222 07/21/22  0607 07/22/22 0417   WBC 6.5 6.1 6.8   HGB 8.5* 9.1* 8.6*    290 287     BMP:    Recent Labs     07/20/22 0222 07/21/22 0607 07/22/22 0417   * 131* 133*   K 4.2 3.8 3.9   CO2 32 27 26   BUN 11 12 12   CREATININE 0.7 0.7 0.7     LIVR: No results for input(s): AST, ALT in the last 72 hours. INR:  No results for input(s): INR in the last 72 hours. APTT:   Recent Labs     07/20/22 0222 07/20/22  0830 07/21/22 0607   APTT 77.1* 81.1* 70.9*      Latest Reference Range & Units 7/18/22 17:20 7/18/22 22:29   Troponin <0.01 ng/mL 0.70 () 0.52 ()   (): Data is critically high    CXR 7/22/2022:  1. Increased pneumonia throughout the right lung remaining most prominent in   the right upper lobe. 2. Emphysema better demonstrated on CT. 3. Possible trace right pleural effusion. ECG 7/21/2022:  Sinus rhythm with anterior TW inversion    7/19/2022 Echo:  Left ventricular cavity size is normal with mild LV hypertrophy and normal systolic function. Ejection fraction is visually estimated to be 55-60%. There are no regional wall motion abnormalities noted. Grade I diastolic dysfunction noted. The right ventricle is normal in size and function. There are no significant valvular abnormalities appreciated in this study. 7/19/2022 LHC:  HEMODYNAMICS:  Aortic pressure was 140/80;  LVEDP 16. There was no gradient between the left ventricle and aorta. ANGIOGRAM/CORONARY ARTERIOGRAM:     The left main coronary artery is normal .  The left anterior descending artery has mild diffuse disease . The left circumflex artery is normal              OM1 99% . The right coronary artery is normal .     SUMMARY:   Single vessel CAD     7/21/2022  Selective angiography with PCI:  ANGIOGRAM/CORONARY ARTERIOGRAM:        The left circumflex artery has mild disease              Om1 99%        INTERVENTION  Guide catheter: XB 3.5 Guide  Runthrough wire used to cross OM1 lesion  Predilation with 3.0 regular balllon  IVUS passed to distal OM, reference diameter 3 mm  Haily 3.0 X 15 mm KIRILL  Post dilation with 3.0 NC balloon to 15 ALEXEI      SUMMARY:   Single vessel CAD  S/p successful IVUS guided PCI OM1 X 1 KIRILL       Telemetry: Sinus rhythm without ectopy  (Personally reviewed)    Assessment/Plan:    NSTEMI  -single vessel disease and S/P KIRILL to OM1; preserved LVEF  -no recurrent angina  -continue medical management and risk factor modification with ASA, Effient and statin  -recommend low dose BB once respiratory status improves    2. Severe COPD   -on home O2  -smoking cessation emphasized  -Rx per Primary team    3. Smoker  -smoking cessation emphasized    4. Acute on chronic respiratory failure with hypoxia  -underlying PNA  -continue antibiotics    5. Hyperlipidemia  -goal LDL < 70  -continue high intensity statin    Stable from cardiac standpoint. Will no longer follow as an inpatient.     Follow up with cardiology in 2-3 weeks    Post angiogram instructions discussed with patient    Discharge Cardiac Meds:  Atorvastatin 40 mg daily  Effient 10 mg daily  ASA 81 mg daily  Toprol XL 12.5 mg daily (once respiratory status improves)    Thank you for allowing us to participate in [facility] POST-ACUTE care    Electronically signed by HECTOR Hannon CNP on 7/22/2022 at 12:08 PM

## 2022-07-22 NOTE — CARE COORDINATION
07/22/22 1107   IMM Letter   IMM Letter given to Patient/Family/Significant other/Guardian/POA/by: second letter provided to patient by Kallie Bear RN   IMM Letter date given: 07/22/22   IMM Letter time given: 1055   Electronically signed by Hailey Ritchie RN on 7/22/2022 at 11:08 AM

## 2022-07-22 NOTE — CARE COORDINATION
Discharge Planning:    Spoke with patient at bedside. Plan remains for patient to return to Home at [facility]. Will need [employer] [facility] and [facility].     Electronically signed by Wilmer Fam RN on 7/22/2022 at 11:09 AM

## 2022-07-22 NOTE — PLAN OF CARE
Problem: Discharge Planning  Goal: Discharge to home or other facility with appropriate resources  Outcome: Progressing     Problem: Pain  Goal: Verbalizes/displays adequate comfort level or baseline comfort level  Outcome: Progressing     Problem: Confusion  Goal: Confusion, delirium, dementia, or psychosis is improved or at baseline  Description: INTERVENTIONS:  1. Assess for possible contributors to thought disturbance, including medications, impaired vision or hearing, underlying metabolic abnormalities, dehydration, psychiatric diagnoses, and notify attending LIP  2. [place] high risk fall precautions, as indicated  3. Provide frequent short contacts to provide reality reorientation, refocusing and direction  4. Decrease environmental stimuli, including noise as appropriate  5. Monitor and intervene to maintain adequate nutrition, hydration, elimination, sleep and activity  6. If unable to ensure safety without constant attention obtain sitter and review sitter guidelines with assigned personnel  7. Initiate Psychosocial CNS and Spiritual Care consult, as indicated  Outcome: Progressing     Problem: Skin/Tissue Integrity  Goal: Absence of new skin breakdown  Description: 1. Monitor for areas of redness and/or skin breakdown  2. Assess vascular access sites hourly  3. Every 4-6 hours minimum:  Change oxygen saturation probe site  4. Every 4-6 hours:  If on nasal continuous positive airway pressure, respiratory therapy assess nares and determine need for appliance change or resting period.   Outcome: Progressing     Problem: Safety - Adult  Goal: Free from fall injury  Outcome: Progressing     Problem: ABCDS Injury Assessment  Goal: Absence of physical injury  Outcome: Progressing     Problem: Chronic Conditions and Co-morbidities  Goal: Patient's chronic conditions and co-morbidity symptoms are monitored and maintained or improved  Outcome: Progressing     Problem: Nutrition Deficit:  Goal: Optimize nutritional status  Outcome: Progressing

## 2022-07-22 NOTE — PROGRESS NOTES
Assessment   Performance deficits / Impairments: Decreased functional mobility ; Decreased ADL status; Decreased ROM; Decreased strength;Decreased endurance;Decreased balance;Decreased high-level IADLs  Assessment: Pt is a 78 y.o. female admitted from SNF with Acute on chronic respiratory failure with hypoxia and hypercapnia, NSTEMI. Pt recent admit with COPD Exac, ROLY, UTI and Sepsis and d/c'd on 7/13/2022 to Home at [facility] for rehab. At baseline, pt lives with significant other in apartment setting and independent ADLs, IADLs, and fxl mobility. Pt currently functioning below baseline d/t the above deficits, today requiring CGA fxl transfers, CGA/min A fxl mobility with RW, min A toileting, and anticipate pt would require overall min/mod A for ADLs. Pt easily fatigued with minimal activity and reuqires rest breaks. Pt demonstrates need for ongoing skilled OT at d/c to maximize pt's safety and independence prior to return home. Prognosis: Good  REQUIRES OT FOLLOW-UP: Yes  Activity Tolerance  Activity Tolerance: Patient limited by fatigue  Activity Tolerance Comments: briefly desat to 88% on 5L with activity, quickly recovers to above 90% with rest        Plan   Plan  Times per Week: 3-5  Current Treatment Recommendations: Strengthening, ROM, Balance training, Functional mobility training, Endurance training, Safety education & training, Self-Care / ADL     Restrictions  Restrictions/Precautions  Restrictions/Precautions: Fall Risk  Position Activity Restriction  Other position/activity restrictions: 5L O2    Subjective   General  Chart Reviewed: Yes  Patient assessed for rehabilitation services?: Yes  Additional Pertinent Hx: Per Dr. River Yanez H&P: \"Pt is an 78y.o. year-old female with a history of hyperlipidemia, fibromyalgia, chronic pain syndrome, severe COPD with chronic hypoxic respiratory failure requiring oxygen at 4 L/min via nasal cannula continuously.   She presents to the emergency room for evaluation following a 2 to 3-day history of worsening shortness of breath. She was recently treated for Pseudomonas pneumonia and discharged to rehab. EMS reports that she was on a very long oxygen tube when they arrived. She was placed on 6 L nasal cannula and her oxygen saturation improved. In the emergency room she was found to have an abnormal EKG with inverted T waves in V2 through V5. Cardiology was consulted and asked that she be put on a heparin drip. Patient denies any current or recent chest pain. She is being admitted for further evaluation and treatment. \" Pt s/p [facility] on 7/19. Family / Caregiver Present: Yes  Referring Practitioner: Rafita Markham MD  Diagnosis: Acute on chronic respiratory failure with hypoxia and hypercapnia, NSTEMI  Subjective  Subjective: Pt seen bedside and agreeable to therapy. Pt frequently coughing throughout session  General Comment  Comments: Per RN ok for therapy     Social/Functional History  Social/Functional History  Lives With: Significant other (Ray)  Type of Home: Apartment (first floor)  Home Layout: One level (laundry in apt)  Home Access: Level entry  Bathroom Shower/Tub: Tub/Shower unit  Bathroom Toilet: Standard  Bathroom Equipment: Shower chair  Home Equipment: Cane  ADL Assistance: [address]: Independent (shared with significant other)  Ambulation Assistance: Independent (without AD)  Transfer Assistance: Independent  Active : No  Patient's  Info: significant other drives  Occupation: Retired  Additional Comments: Pt recent admit with COPD Exac, ROLY, UTI and Sepsis and d/c'd on 7/13/2022 to Home at [facility] for rehab. Objective     Safety Devices  Type of Devices: Call light within reach; Chair alarm in place;Gait belt;Left in chair;Nurse notified     Toilet Transfers  Toilet - Technique: Ambulating  Equipment Used: Grab bars  Toilet Transfer: Minimal assistance     ADL  Toileting: Minimal assistance (terrell; assist with clothes management up/down)     Activity Tolerance  Activity Tolerance: Patient limited by endurance  Activity Tolerance Comments: spO2 decreased to 89% with activity on 5 liters O2    Bed mobility  Bed Mobility Comments: n/a this session: the pt already up in the chair and remained up in the chair at end of session    Pt ambulated chair > far side of bed with RW and min A. Bed > bathroom > chair with Rw. Cuing and assist to stay inside walker when ambulating. Fatigues quickly    Transfers  Sit to stand: Minimal assistance;Contact guard assistance  Stand to sit: Minimal assistance;Contact guard assistance  Transfer Comments: CGA/min A to stand from chair, bed and toilet to/from RW- cuing for hand placement. Vision  Vision: Impaired  Vision Exceptions: Wears glasses for reading  Hearing  Hearing: Within functional limits  Cognition  Overall Cognitive Status: WFL  Orientation  Overall Orientation Status: Within Functional Limits                  Education Given To: Patient; Family  Education Provided: Role of Therapy;Plan of Care;Transfer Training;ADL Adaptive Strategies; Energy Conservation  Barriers to Learning: None  Education Outcome: Verbalized understanding;Demonstrated understanding          AM-PAC Score    -[facility] Inpatient Daily Activity Raw Score: 16 (07/22/22 1453)  AM-PAC Inpatient ADL T-Scale Score : 35.96 (07/22/22 1453)  ADL Inpatient CMS 0-100% Score: 53.32 (07/22/22 1453)  ADL Inpatient CMS G-Code Modifier : CK (07/22/22 1453)    Goals  Short Term Goals  Time Frame for Short term goals: Prior to d/c: goals ongoing  Short Term Goal 1: Pt will bathe with SBA. Short Term Goal 2: Pt will dress with SBA. Short Term Goal 3: Pt will toilet with SBA/supervision. Short Term Goal 4: Pt will complete fxl mobility and fxl transfers to/from ADL surfaces with SBA/supervision using AD.   Short Term Goal 5: Pt will tolerate standing >5 minutes for functional task with SBA/supervision while maintaining O2 sats >90% to improve standing tolerance for ADL routine. Long Term Goals  Time Frame for Long term goals : STGs=LTGs  Patient Goals   Patient goals : to finish rehab at eventually return home. Therapy Time   Individual Concurrent Group Co-treatment   Time In 1420         Time Out 1453         Minutes 33         Timed Code Treatment Minutes: 35 Minutes     This note to serve as OT d/c summary if pt is d/c-ed prior to next therapy session.     Eduin Durham, OTR/L

## 2022-07-23 ENCOUNTER — APPOINTMENT (OUTPATIENT)
Dept: CT IMAGING | Age: 77
DRG: 246 | End: 2022-07-23
Payer: MEDICARE

## 2022-07-23 LAB
ANION GAP SERPL CALCULATED.3IONS-SCNC: 5 MMOL/L (ref 3–16)
BASOPHILS ABSOLUTE: 0 K/UL (ref 0–0.2)
BASOPHILS RELATIVE PERCENT: 0.5 %
BUN BLDV-MCNC: 11 MG/DL (ref 7–20)
CALCIUM SERPL-MCNC: 7.3 MG/DL (ref 8.3–10.6)
CHLORIDE BLD-SCNC: 102 MMOL/L (ref 99–110)
CO2: 26 MMOL/L (ref 21–32)
CREAT SERPL-MCNC: 0.6 MG/DL (ref 0.6–1.2)
CULTURE, RESPIRATORY: ABNORMAL
CULTURE, RESPIRATORY: ABNORMAL
EOSINOPHILS ABSOLUTE: 0.2 K/UL (ref 0–0.6)
EOSINOPHILS RELATIVE PERCENT: 4.8 %
GFR AFRICAN AMERICAN: >60
GFR NON-AFRICAN AMERICAN: >60
GLUCOSE BLD-MCNC: 102 MG/DL (ref 70–99)
GLUCOSE BLD-MCNC: 107 MG/DL (ref 70–99)
GLUCOSE BLD-MCNC: 107 MG/DL (ref 70–99)
GLUCOSE BLD-MCNC: 110 MG/DL (ref 70–99)
GLUCOSE BLD-MCNC: 126 MG/DL (ref 70–99)
GRAM STAIN RESULT: ABNORMAL
HCT VFR BLD CALC: 23.1 % (ref 36–48)
HEMOGLOBIN: 7.7 G/DL (ref 12–16)
LYMPHOCYTES ABSOLUTE: 0.6 K/UL (ref 1–5.1)
LYMPHOCYTES RELATIVE PERCENT: 11.5 %
MCH RBC QN AUTO: 31.3 PG (ref 26–34)
MCHC RBC AUTO-ENTMCNC: 33.4 G/DL (ref 31–36)
MCV RBC AUTO: 93.9 FL (ref 80–100)
MONOCYTES ABSOLUTE: 0.6 K/UL (ref 0–1.3)
MONOCYTES RELATIVE PERCENT: 11.8 %
NEUTROPHILS ABSOLUTE: 3.7 K/UL (ref 1.7–7.7)
NEUTROPHILS RELATIVE PERCENT: 71.4 %
ORGANISM: ABNORMAL
PDW BLD-RTO: 14.2 % (ref 12.4–15.4)
PERFORMED ON: ABNORMAL
PLATELET # BLD: 263 K/UL (ref 135–450)
PMV BLD AUTO: 7 FL (ref 5–10.5)
POTASSIUM REFLEX MAGNESIUM: 3.7 MMOL/L (ref 3.5–5.1)
RBC # BLD: 2.46 M/UL (ref 4–5.2)
SODIUM BLD-SCNC: 133 MMOL/L (ref 136–145)
WBC # BLD: 5.1 K/UL (ref 4–11)

## 2022-07-23 PROCEDURE — 80048 BASIC METABOLIC PNL TOTAL CA: CPT

## 2022-07-23 PROCEDURE — 94761 N-INVAS EAR/PLS OXIMETRY MLT: CPT

## 2022-07-23 PROCEDURE — 6370000000 HC RX 637 (ALT 250 FOR IP): Performed by: INTERNAL MEDICINE

## 2022-07-23 PROCEDURE — 99223 1ST HOSP IP/OBS HIGH 75: CPT | Performed by: INTERNAL MEDICINE

## 2022-07-23 PROCEDURE — 85025 COMPLETE CBC W/AUTO DIFF WBC: CPT

## 2022-07-23 PROCEDURE — 2700000000 HC OXYGEN THERAPY PER DAY

## 2022-07-23 PROCEDURE — 6370000000 HC RX 637 (ALT 250 FOR IP): Performed by: NURSE PRACTITIONER

## 2022-07-23 PROCEDURE — 94640 AIRWAY INHALATION TREATMENT: CPT

## 2022-07-23 PROCEDURE — 2500000003 HC RX 250 WO HCPCS: Performed by: INTERNAL MEDICINE

## 2022-07-23 PROCEDURE — 2580000003 HC RX 258: Performed by: INTERNAL MEDICINE

## 2022-07-23 PROCEDURE — 71250 CT THORAX DX C-: CPT

## 2022-07-23 PROCEDURE — 36415 COLL VENOUS BLD VENIPUNCTURE: CPT

## 2022-07-23 PROCEDURE — 1200000000 HC SEMI PRIVATE

## 2022-07-23 PROCEDURE — 6360000002 HC RX W HCPCS: Performed by: INTERNAL MEDICINE

## 2022-07-23 RX ORDER — METRONIDAZOLE 500 MG/100ML
500 INJECTION, SOLUTION INTRAVENOUS EVERY 8 HOURS
Status: DISCONTINUED | OUTPATIENT
Start: 2022-07-23 | End: 2022-07-25

## 2022-07-23 RX ORDER — AZITHROMYCIN 500 MG/1
500 TABLET, FILM COATED ORAL DAILY
Status: COMPLETED | OUTPATIENT
Start: 2022-07-24 | End: 2022-07-25

## 2022-07-23 RX ADMIN — TRAZODONE HYDROCHLORIDE 100 MG: 100 TABLET ORAL at 22:07

## 2022-07-23 RX ADMIN — HYDROCODONE BITARTRATE AND ACETAMINOPHEN 1 TABLET: 5; 325 TABLET ORAL at 09:58

## 2022-07-23 RX ADMIN — ASPIRIN 81 MG: 81 TABLET, COATED ORAL at 09:58

## 2022-07-23 RX ADMIN — NYSTATIN 500000 UNITS: 100000 SUSPENSION ORAL at 09:59

## 2022-07-23 RX ADMIN — MOMETASONE FUROATE AND FORMOTEROL FUMARATE DIHYDRATE 2 PUFF: 200; 5 AEROSOL RESPIRATORY (INHALATION) at 10:23

## 2022-07-23 RX ADMIN — DULOXETINE HYDROCHLORIDE 60 MG: 60 CAPSULE, DELAYED RELEASE ORAL at 09:58

## 2022-07-23 RX ADMIN — PANTOPRAZOLE SODIUM 40 MG: 40 TABLET, DELAYED RELEASE ORAL at 09:58

## 2022-07-23 RX ADMIN — OXYCODONE 5 MG: 5 TABLET ORAL at 22:07

## 2022-07-23 RX ADMIN — METRONIDAZOLE 500 MG: 500 INJECTION, SOLUTION INTRAVENOUS at 12:29

## 2022-07-23 RX ADMIN — SODIUM CHLORIDE, PRESERVATIVE FREE 10 ML: 5 INJECTION INTRAVENOUS at 08:28

## 2022-07-23 RX ADMIN — GABAPENTIN 600 MG: 300 CAPSULE ORAL at 21:58

## 2022-07-23 RX ADMIN — GABAPENTIN 600 MG: 300 CAPSULE ORAL at 09:58

## 2022-07-23 RX ADMIN — MOMETASONE FUROATE AND FORMOTEROL FUMARATE DIHYDRATE 2 PUFF: 200; 5 AEROSOL RESPIRATORY (INHALATION) at 19:52

## 2022-07-23 RX ADMIN — PRASUGREL 10 MG: 10 TABLET, FILM COATED ORAL at 10:02

## 2022-07-23 RX ADMIN — METRONIDAZOLE 500 MG: 500 INJECTION, SOLUTION INTRAVENOUS at 22:05

## 2022-07-23 RX ADMIN — NYSTATIN 500000 UNITS: 100000 SUSPENSION ORAL at 18:18

## 2022-07-23 RX ADMIN — OXYCODONE 5 MG: 5 TABLET ORAL at 16:14

## 2022-07-23 RX ADMIN — TIOTROPIUM BROMIDE INHALATION SPRAY 2 PUFF: 3.12 SPRAY, METERED RESPIRATORY (INHALATION) at 10:23

## 2022-07-23 RX ADMIN — TIZANIDINE 4 MG: 4 TABLET ORAL at 21:58

## 2022-07-23 RX ADMIN — ALBUTEROL SULFATE 2 PUFF: 90 AEROSOL, METERED RESPIRATORY (INHALATION) at 19:52

## 2022-07-23 RX ADMIN — AZITHROMYCIN MONOHYDRATE 500 MG: 500 INJECTION, POWDER, LYOPHILIZED, FOR SOLUTION INTRAVENOUS at 08:30

## 2022-07-23 RX ADMIN — CEFEPIME 2000 MG: 2 INJECTION, POWDER, FOR SOLUTION INTRAVENOUS at 18:27

## 2022-07-23 RX ADMIN — SODIUM CHLORIDE 10 ML/HR: 9 INJECTION, SOLUTION INTRAVENOUS at 18:26

## 2022-07-23 RX ADMIN — DULOXETINE HYDROCHLORIDE 60 MG: 60 CAPSULE, DELAYED RELEASE ORAL at 21:58

## 2022-07-23 RX ADMIN — SODIUM CHLORIDE, PRESERVATIVE FREE 10 ML: 5 INJECTION INTRAVENOUS at 21:58

## 2022-07-23 RX ADMIN — NYSTATIN 500000 UNITS: 100000 SUSPENSION ORAL at 21:58

## 2022-07-23 RX ADMIN — ATORVASTATIN CALCIUM 40 MG: 40 TABLET, FILM COATED ORAL at 21:58

## 2022-07-23 RX ADMIN — NYSTATIN 500000 UNITS: 100000 SUSPENSION ORAL at 13:42

## 2022-07-23 RX ADMIN — CEFEPIME 2000 MG: 2 INJECTION, POWDER, FOR SOLUTION INTRAVENOUS at 07:10

## 2022-07-23 ASSESSMENT — PAIN DESCRIPTION - LOCATION: LOCATION: BACK

## 2022-07-23 ASSESSMENT — PAIN - FUNCTIONAL ASSESSMENT: PAIN_FUNCTIONAL_ASSESSMENT: PREVENTS OR INTERFERES SOME ACTIVE ACTIVITIES AND ADLS

## 2022-07-23 ASSESSMENT — PAIN SCALES - GENERAL
PAINLEVEL_OUTOF10: 8
PAINLEVEL_OUTOF10: 10

## 2022-07-23 ASSESSMENT — PAIN DESCRIPTION - ORIENTATION: ORIENTATION: LOWER

## 2022-07-23 NOTE — PLAN OF CARE
chronic conditions and co-morbidity symptoms are monitored and maintained or improved  Outcome: Progressing  Flowsheets (Taken 7/23/2022 0815)  Care Plan - Patient's Chronic Conditions and Co-Morbidity Symptoms are Monitored and Maintained or Improved: Monitor and assess patient's chronic conditions and comorbid symptoms for stability, deterioration, or improvement     Problem: Nutrition Deficit:  Goal: Optimize nutritional status  Outcome: Progressing

## 2022-07-23 NOTE — CONSULTS
REASON FOR CONSULTATION/CC: Pneumonia      Consult at request of Anthony Ferrari MD     PCP: HECTOR Luong - CNP  Established Pulmonologist: Annie Ramsey    Chief Complaint   Patient presents with    Shortness of Breath     At [facility] for rehab for pneumonia increased sob over the last few days. Pt on 4-5 lpm via nc all the time. Per EMS the cord was very long ems placed on 6lpm via nc and o2 sat came up to 99%       HISTORY OF PRESENT ILLNESS: Anjali Darby is a 78y.o. year old female with a history of COPD who presents with worsening shortness of breath and productive cough. Patient was recently admitted for Pseudomonas pneumonia discharged on antibiotics however symptoms have worsened since that time. Patient was readmitted on 7/18 restarted on antipseudomonal antimicrobials. However, due to progressive symptoms of hypoxia and productive cough repeat chest x-ray obtained yesterday showed worsening pneumonia with possible abscess evolution. Patient has associated symptom of productive cough. She denies fever/chills. Has not found anything that makes it better or worse. Has hemoptysis has been present for greater than a month.       Past Medical History:   Diagnosis Date    Chronic pain syndrome     Colorectal polyps     COPD (chronic obstructive pulmonary disease) (HCC)     CTS (carpal tunnel syndrome)     BILATERAL    DDD (degenerative disc disease), lumbar     Depression     Family hx of colon cancer     Fibromyalgia     Hyperlipemia     IBS (irritable bowel syndrome)     Lumbar spondylosis     New onset type 2 diabetes mellitus ([address] 2/3/2020    Urticaria, chronic          Past Surgical History:   Procedure Laterality Date    CARPAL TUNNEL RELEASE Bilateral     CERVICAL POLYP REMOVAL  9/2004    INTRACAPSULAR CATARACT EXTRACTION Left 6/23/2020    [facility] with [facility] lens [facility]    Phacoemulsification with intraocular lens implant performed by Geoff Angela MD at [address]      patient denies        Family Hx  family history includes Alzheimer's Disease in her mother; Cancer in her father; Diabetes in her daughter, daughter, and daughter; Heart Disease in her brother; Heart Failure in her brother. Social Hx   reports that she has been smoking cigarettes. She started smoking about 60 years ago. She has a 55.00 pack-year smoking history. She has never used smokeless tobacco.    Scheduled Meds:   [START ON 7/24/2022] azithromycin  500 mg Oral Daily    aspirin  81 mg Oral Daily    nystatin  5 mL Oral 4x Daily    prasugrel  10 mg Oral Daily    sodium chloride flush  5-40 mL IntraVENous 2 times per day    atorvastatin  40 mg Oral Nightly    tiZANidine  4 mg Oral Nightly    pantoprazole  40 mg Oral Daily    gabapentin  600 mg Oral BID    DULoxetine  60 mg Oral BID    insulin lispro  0-12 Units SubCUTAneous TID WC    insulin lispro  0-6 Units SubCUTAneous Nightly    cefepime  2,000 mg IntraVENous Q12H    mometasone-formoterol  2 puff Inhalation BID    And    tiotropium  2 puff Inhalation Daily       Continuous Infusions:   sodium chloride      sodium chloride 10 mL/hr at 07/21/22 2127    dextrose         PRN Meds:  sodium chloride, sodium chloride flush, sodium chloride, acetaminophen, oxyCODONE, HYDROcodone 5 mg - acetaminophen, perflutren lipid microspheres, perflutren lipid microspheres, acetaminophen, traZODone, ipratropium-albuterol, cetirizine, albuterol sulfate HFA, glucose, dextrose bolus **OR** dextrose bolus, glucagon (rDNA), dextrose, ondansetron, polyethylene glycol, acetaminophen **OR** acetaminophen    ALLERGIES:  Patient has No Known Allergies.     REVIEW OF SYSTEMS:  Constitutional: Negative for fever  HENT: Negative for sore throat  Eyes: Negative for redness   Respiratory: +dyspnea, cough  Cardiovascular: Negative for chest pain  Gastrointestinal: Negative for vomiting, diarrhea   Genitourinary: Negative for hematuria   Musculoskeletal: Negative for arthralgias   Skin: Negative for rash  Neurological: Negative for syncope  Hematological: Negative for adenopathy  Psychiatric/Behavorial: Negative for anxiety    Objective:   PHYSICAL EXAM:  Blood pressure 131/66, pulse (!) 105, temperature 98.2 °F (36.8 °C), temperature source Oral, resp. rate 22, height 5' 4\" (1.626 m), weight 136 lb 11 oz (62 kg), SpO2 95 %, not currently breastfeeding.'  Gen:  No acute distress. Eyes: PERRL. Anicteric sclera. No conjunctival injection. ENT: No discharge. Posterior oropharynx clear. External appearance of ears and nose normal.  Neck: Trachea midline. No mass   Resp:  No crackles. No wheezes. + Bilateral rhonchi. No dullness on percussion. CV: Regular rate. Regular rhythm. No murmur or rub. No edema. GI: Soft, Non-tender. Non-distended. +BS  Skin: Warm, dry, w/o erythema. Lymph: No cervical or supraclavicular LAD. M/S: No cyanosis. No clubbing. Neuro:  no focal neurologic deficit. Moves all extremities  Psych: Awake and alert, Oriented x 3. Judgement and insight appropriate. Mood stable. Data Reviewed:   LABS:  CBC:   Recent Labs     07/21/22  0607 07/22/22 0417 07/23/22  0422   WBC 6.1 6.8 5.1   HGB 9.1* 8.6* 7.7*   HCT 26.8* 25.0* 23.1*   MCV 93.9 93.3 93.9    287 263     BMP:   Recent Labs     07/21/22  0607 07/22/22  0417 07/23/22  0422   * 133* 133*   K 3.8 3.9 3.7   CL 96* 99 102   CO2 27 26 26   BUN 12 12 11   CREATININE 0.7 0.7 0.6     LIVER PROFILE: No results for input(s): AST, ALT, LIPASE, BILIDIR, BILITOT, ALKPHOS in the last 72 hours. Invalid input(s): AMYLASE,  ALB  PT/INR: No results for input(s): PROTIME, INR in the last 72 hours.   APTT:   Recent Labs     07/21/22  0607   APTT 70.9*     UA:  Recent Labs     07/22/22  0631   COLORU Yellow   PHUR 6.5   WBCUA 2   RBCUA 5*   BACTERIA None Seen   CLARITYU Clear   SPECGRAV 1.049   LEUKOCYTESUR Negative   UROBILINOGEN 0.2   BILIRUBINUR Negative   BLOODU Negative   GLUCOSEU Negative     No results for input(s): PHART, HZK4QPE, PO2ART in the last 72 hours. Radiology Review:  Pertinent images / reports were reviewed as a part of this visit. Chest x-ray images reviewed personally by me, interpretation as follows:  -Worsening right upper lobe consolidation when compared to x-ray from 4 days ago. Recommend CT chest for further evaluation      Pulmonary function testing  Moderate COPD with an FEV1 of 59%      Assessment/Plan:     Acute hypoxemic respiratory failure with SPO2 less than 90% on room air-worsening  Right upper lobe pneumonia, due to Pseudomonas  -Unclear that this pneumonia ever fully resolved following previous admission and Pseudomonas treatment which grew at that time  -Agree with cefepime for antipseudomonal coverage, add Flagyl  -We will get a CT of the chest to ensure there is not a developing abscess nor postobstructive component    Moderate COPD  -Dulera, Spiriva, albuterol as needed    This note was transcribed using [id number] [address] [employer]. Please disregard any translational errors.     Thank you for the consult    [address]  Pulmonary, Sleep and Critical Care Medicine

## 2022-07-23 NOTE — PROGRESS NOTES
Hospitalist Progress Note      PCP: Chaparrita Pina, APRN - CNP    Date of Admission: 7/18/2022    Chief Complaint:   Chief Complaint   Patient presents with    Shortness of Breath     At [facility] for rehab for pneumonia increased sob over the last few days. Pt on 4-5 lpm via nc all the time. Per EMS the cord was very long ems placed on 6lpm via nc and o2 sat came up to 99%         Hospital Course: Pt is an 78y.o. year-old female with a history of hyperlipidemia, fibromyalgia, chronic pain syndrome, severe COPD with chronic hypoxic respiratory failure requiring oxygen at 4 L/min via nasal cannula continuously. She presents to the emergency room for evaluation following a 2 to 3-day history of worsening shortness of breath. She was recently treated for Pseudomonas pneumonia and discharged to rehab. EMS reports that she was on a very long oxygen tube when they arrived. She was placed on 6 L nasal cannula and her oxygen saturation improved. In the emergency room she was found to have an abnormal EKG with inverted T waves in V2 through V5. Cardiology was consulted and asked that she be put on a heparin drip. Patient denies any current or recent chest pain. She is being admitted for further evaluation and treatment. Associated signs and symptoms do not include chest pain, lightheaded, dizziness, diaphoresis, edema, orthopnea, paroxysmal nocturnal dyspnea, fever or chills. No recent medication changes.      7/19  Feeling better, supplemental O2 5 L    Subjective:  Continues to have productive cough feels unwell    Medications:  Reviewed    Infusion Medications    sodium chloride      sodium chloride 10 mL/hr at 07/21/22 2127    dextrose       Scheduled Medications    [START ON 7/24/2022] azithromycin  500 mg Oral Daily    metroNIDAZOLE  500 mg IntraVENous Q8H    aspirin  81 mg Oral Daily    nystatin  5 mL Oral 4x Daily    prasugrel  10 mg Oral Daily    sodium chloride flush  5-40 mL IntraVENous 2 times per day    atorvastatin  40 mg Oral Nightly    tiZANidine  4 mg Oral Nightly    pantoprazole  40 mg Oral Daily    gabapentin  600 mg Oral BID    DULoxetine  60 mg Oral BID    insulin lispro  0-12 Units SubCUTAneous TID     insulin lispro  0-6 Units SubCUTAneous Nightly    cefepime  2,000 mg IntraVENous Q12H    mometasone-formoterol  2 puff Inhalation BID    And    tiotropium  2 puff Inhalation Daily     PRN Meds: sodium chloride, sodium chloride flush, sodium chloride, acetaminophen, oxyCODONE, HYDROcodone 5 mg - acetaminophen, perflutren lipid microspheres, perflutren lipid microspheres, acetaminophen, traZODone, ipratropium-albuterol, cetirizine, albuterol sulfate HFA, glucose, dextrose bolus **OR** dextrose bolus, glucagon (rDNA), dextrose, ondansetron, polyethylene glycol, acetaminophen **OR** acetaminophen      Intake/Output Summary (Last 24 hours) at 7/23/2022 1406  Last data filed at 7/23/2022 1229  Gross per 24 hour   Intake 700 ml   Output 850 ml   Net -150 ml         Physical Exam Performed:    /72   Pulse 100   Temp 98.2 °F (36.8 °C) (Oral)   Resp 16   Ht 5' 4\" (1.626 m)   Wt 136 lb 11 oz (62 kg)   SpO2 96%   BMI 23.46 kg/m²     General appearance: No apparent distress, appears stated age and cooperative. HEENT: Pupils equal, round, and reactive to light. Conjunctivae/corneas clear. Neck: Supple, with full range of motion. No jugular venous distention. Trachea midline. Respiratory:  diffuse crackles and wheezing worse than 7/22/2022 exam  Cardiovascular: Regular rate and rhythm with normal S1/S2 without murmurs, rubs or gallops. Abdomen: Soft, non-tender, non-distended with normal bowel sounds. Musculoskeletal: No clubbing, cyanosis or edema bilaterally. Full range of motion without deformity. Skin: Skin color, texture, turgor normal.  No rashes or lesions. Neurologic:  Neurovascularly intact without any focal sensory/motor deficits.  Cranial nerves: II-XII intact, grossly non-focal.  Psychiatric: Alert and oriented, thought content appropriate, normal insight  Capillary Refill: Brisk,3 seconds, normal   Peripheral Pulses: +2 palpable, equal bilaterally       Labs:   Recent Labs     07/21/22  0607 07/22/22 0417 07/23/22 0422   WBC 6.1 6.8 5.1   HGB 9.1* 8.6* 7.7*   HCT 26.8* 25.0* 23.1*    287 263       Recent Labs     07/21/22  0607 07/22/22 0417 07/23/22 0422   * 133* 133*   K 3.8 3.9 3.7   CL 96* 99 102   CO2 27 26 26   BUN 12 12 11   CREATININE 0.7 0.7 0.6   CALCIUM 7.4* 7.5* 7.3*       No results for input(s): AST, ALT, BILIDIR, BILITOT, ALKPHOS in the last 72 hours. No results for input(s): INR in the last 72 hours. No results for input(s): Radha Ill in the last 72 hours. 7/19/2022 cardiac cath     HEMODYNAMICS:  Aortic pressure was 140/80;  LVEDP 16. There was no gradient between the left ventricle and aorta. ANGIOGRAM/CORONARY ARTERIOGRAM:        The left main coronary artery is normal .     The left anterior descending artery has mild diffuse disease . The left circumflex artery is normal              OM1 99% . The right coronary artery is normal .     SUMMARY:   Single vessel CAD      Urinalysis:      Lab Results   Component Value Date/Time    NITRU Negative 07/22/2022 06:31 AM    WBCUA 2 07/22/2022 06:31 AM    BACTERIA None Seen 07/22/2022 06:31 AM    RBCUA 5 07/22/2022 06:31 AM    BLOODU Negative 07/22/2022 06:31 AM    SPECGRAV 1.049 07/22/2022 06:31 AM    GLUCOSEU Negative 07/22/2022 06:31 AM       Radiology:  CT CHEST WO CONTRAST   Preliminary Result   1. Mixed consolidative and ground-glass opacities as well as interstitial   thickening throughout the majority of the right lung compatible with   pneumonia. 2. There is a cavity in the right upper lobe demonstrating an air-fluid level   measuring up to 9.7 cm concerning for abscess formation. 3. Trace right pleural effusion.    4. Prominent and enlarged mediastinal lymph nodes, likely reactive. 5. Stable 6 mm left lower lobe pulmonary nodule. RECOMMENDATIONS:   Fleischner Society guidelines for follow-up and management of incidentally   detected pulmonary nodules:      Single Solid Nodule:      Nodule size less than 6 mm   In a low-risk patient, no routine follow-up. In a high-risk patient, optional CT at 12 months. Nodule size equals 6-8 mm   In a low-risk patient, CT at 6-12 months, then consider CT at 18-24 months. In a high-risk patient, CT at 6-12 months, then CT at 18-24 months. Nodule size greater than 8 mm         In a low-risk patient, consider CT at 3 months, PET/CT, or tissue sampling. In a high-risk patient, consider CT at 3 months, PET/CT, or tissue sampling. Multiple Solid Nodules:      Nodule size less than 6 mm   In a low-risk patient, no routine follow-up. In a high-risk patient, optional CT at 12 months. Nodule size equals 6-8 mm   In a low-risk patient, CT at 3-6 months, then consider CT at 18-24 months. In a high-risk patient, CT at 3-6 months, then CT at 18-24 months. Nodule size greater than 8 mm   In a low-risk patient, CT at 3-6 months, then consider CT at 18-24 months. In a high-risk patient, CT at 3-6 months, then CT at 18-24 months. - Low risk patients include individuals with minimal or absent history of   smoking and other known risk factors. - High risk patients include individuals with a history or smoking or known   risk factors. Radiology 2017 http://pubs. rsna.org/doi/full/10.1148/radiol. 8756523879         XR CHEST PORTABLE   Final Result   1. Increased pneumonia throughout the right lung remaining most prominent in   the right upper lobe. 2. Emphysema better demonstrated on CT. 3. Possible trace right pleural effusion.          XR CHEST PORTABLE   Final Result   Improved aeration of the right chest with persistent consolidation in the mid   to upper right chest. Assessment/Plan:    Active Hospital Problems    Diagnosis     Acute on chronic respiratory failure with hypoxia and hypercapnia (HCC) [J96.21, J96.22]      Priority: Medium    Abnormal EKG [R94.31]      Priority: Medium    Chronic obstructive pulmonary disease (HCC) [J44.9]      Priority: Medium    COPD, severe (HCC) [J44.9]     Chronic pain syndrome [G89.4]     Hypercholesteremia [E78.00]     Fibromyalgia [M79.7]      Acute on chronic respiratory failure with hypoxia and hypercapnia (HCC) -  X-ray looks worse  We will ask pulmonary to see  Continue antibiotics  Case discussed with Dr. Jenny Fisher  Concern for abscess CT chest ordered  Continue current antibiotics    Critical coronary artery disease single-vessel  Status post PCI to left circumflex yesterday with stent  Now on antiplatelet therapy     COPD, severe ([address] - without acute exacerbation. Will provide Nebulizer treatments as needed and continue home medications. Patient will be monitored closely, and deep breathing and coughing will be encouraged while awake. Hyperlipidemia - No current evidence of Rhabdomyolysis or other adverse effects. Continue statin therapy while in the hospital     Chronic pain syndrome -continue her home pain med regimen     Fibromyalgia - provide supportive care       DVT Prophylaxis: heparin  Diet: ADULT DIET; Regular; Low Sodium (2 gm)  Code Status: Full Code    PT/OT Eval Status: requested    Dispo - return to NH when medically stable.   Patient is quite ill right now nothing likely in the next several days  Jacob Smith MD

## 2022-07-24 LAB
ANION GAP SERPL CALCULATED.3IONS-SCNC: 7 MMOL/L (ref 3–16)
BASOPHILS ABSOLUTE: 0 K/UL (ref 0–0.2)
BASOPHILS RELATIVE PERCENT: 0.4 %
BUN BLDV-MCNC: 9 MG/DL (ref 7–20)
CALCIUM SERPL-MCNC: 7.1 MG/DL (ref 8.3–10.6)
CHLORIDE BLD-SCNC: 104 MMOL/L (ref 99–110)
CO2: 24 MMOL/L (ref 21–32)
CREAT SERPL-MCNC: 0.5 MG/DL (ref 0.6–1.2)
CULTURE, RESPIRATORY: ABNORMAL
CULTURE, RESPIRATORY: ABNORMAL
EOSINOPHILS ABSOLUTE: 0.2 K/UL (ref 0–0.6)
EOSINOPHILS RELATIVE PERCENT: 4.6 %
GFR AFRICAN AMERICAN: >60
GFR NON-AFRICAN AMERICAN: >60
GLUCOSE BLD-MCNC: 101 MG/DL (ref 70–99)
GLUCOSE BLD-MCNC: 106 MG/DL (ref 70–99)
GLUCOSE BLD-MCNC: 133 MG/DL (ref 70–99)
GLUCOSE BLD-MCNC: 94 MG/DL (ref 70–99)
GLUCOSE BLD-MCNC: 96 MG/DL (ref 70–99)
GRAM STAIN RESULT: ABNORMAL
HCT VFR BLD CALC: 22.1 % (ref 36–48)
HEMOGLOBIN: 7.6 G/DL (ref 12–16)
LYMPHOCYTES ABSOLUTE: 0.6 K/UL (ref 1–5.1)
LYMPHOCYTES RELATIVE PERCENT: 12.9 %
MCH RBC QN AUTO: 32 PG (ref 26–34)
MCHC RBC AUTO-ENTMCNC: 34.3 G/DL (ref 31–36)
MCV RBC AUTO: 93.2 FL (ref 80–100)
MONOCYTES ABSOLUTE: 0.6 K/UL (ref 0–1.3)
MONOCYTES RELATIVE PERCENT: 12.1 %
NEUTROPHILS ABSOLUTE: 3.3 K/UL (ref 1.7–7.7)
NEUTROPHILS RELATIVE PERCENT: 70 %
ORGANISM: ABNORMAL
PDW BLD-RTO: 14.1 % (ref 12.4–15.4)
PERFORMED ON: ABNORMAL
PERFORMED ON: ABNORMAL
PERFORMED ON: NORMAL
PERFORMED ON: NORMAL
PLATELET # BLD: 268 K/UL (ref 135–450)
PMV BLD AUTO: 7.3 FL (ref 5–10.5)
POTASSIUM REFLEX MAGNESIUM: 3.7 MMOL/L (ref 3.5–5.1)
RBC # BLD: 2.37 M/UL (ref 4–5.2)
SODIUM BLD-SCNC: 135 MMOL/L (ref 136–145)
WBC # BLD: 4.7 K/UL (ref 4–11)

## 2022-07-24 PROCEDURE — 2700000000 HC OXYGEN THERAPY PER DAY

## 2022-07-24 PROCEDURE — 6370000000 HC RX 637 (ALT 250 FOR IP): Performed by: INTERNAL MEDICINE

## 2022-07-24 PROCEDURE — 1200000000 HC SEMI PRIVATE

## 2022-07-24 PROCEDURE — 80048 BASIC METABOLIC PNL TOTAL CA: CPT

## 2022-07-24 PROCEDURE — 94640 AIRWAY INHALATION TREATMENT: CPT

## 2022-07-24 PROCEDURE — 94761 N-INVAS EAR/PLS OXIMETRY MLT: CPT

## 2022-07-24 PROCEDURE — 36415 COLL VENOUS BLD VENIPUNCTURE: CPT

## 2022-07-24 PROCEDURE — 85025 COMPLETE CBC W/AUTO DIFF WBC: CPT

## 2022-07-24 PROCEDURE — 6360000002 HC RX W HCPCS: Performed by: INTERNAL MEDICINE

## 2022-07-24 PROCEDURE — 2580000003 HC RX 258: Performed by: INTERNAL MEDICINE

## 2022-07-24 PROCEDURE — 6370000000 HC RX 637 (ALT 250 FOR IP): Performed by: NURSE PRACTITIONER

## 2022-07-24 PROCEDURE — 2500000003 HC RX 250 WO HCPCS: Performed by: INTERNAL MEDICINE

## 2022-07-24 PROCEDURE — 99233 SBSQ HOSP IP/OBS HIGH 50: CPT | Performed by: INTERNAL MEDICINE

## 2022-07-24 RX ORDER — 0.9 % SODIUM CHLORIDE 0.9 %
500 INTRAVENOUS SOLUTION INTRAVENOUS ONCE
Status: DISCONTINUED | OUTPATIENT
Start: 2022-07-24 | End: 2022-08-04

## 2022-07-24 RX ADMIN — AZITHROMYCIN MONOHYDRATE 500 MG: 500 TABLET ORAL at 09:01

## 2022-07-24 RX ADMIN — GABAPENTIN 600 MG: 300 CAPSULE ORAL at 09:01

## 2022-07-24 RX ADMIN — SODIUM CHLORIDE: 9 INJECTION, SOLUTION INTRAVENOUS at 05:06

## 2022-07-24 RX ADMIN — ASPIRIN 81 MG: 81 TABLET, COATED ORAL at 09:01

## 2022-07-24 RX ADMIN — METRONIDAZOLE 500 MG: 500 INJECTION, SOLUTION INTRAVENOUS at 13:44

## 2022-07-24 RX ADMIN — MOMETASONE FUROATE AND FORMOTEROL FUMARATE DIHYDRATE 2 PUFF: 200; 5 AEROSOL RESPIRATORY (INHALATION) at 08:03

## 2022-07-24 RX ADMIN — PANTOPRAZOLE SODIUM 40 MG: 40 TABLET, DELAYED RELEASE ORAL at 09:01

## 2022-07-24 RX ADMIN — NYSTATIN 500000 UNITS: 100000 SUSPENSION ORAL at 09:01

## 2022-07-24 RX ADMIN — SODIUM CHLORIDE, PRESERVATIVE FREE 10 ML: 5 INJECTION INTRAVENOUS at 09:04

## 2022-07-24 RX ADMIN — CEFEPIME 2000 MG: 2 INJECTION, POWDER, FOR SOLUTION INTRAVENOUS at 06:09

## 2022-07-24 RX ADMIN — MOMETASONE FUROATE AND FORMOTEROL FUMARATE DIHYDRATE 2 PUFF: 200; 5 AEROSOL RESPIRATORY (INHALATION) at 20:03

## 2022-07-24 RX ADMIN — ATORVASTATIN CALCIUM 40 MG: 40 TABLET, FILM COATED ORAL at 21:47

## 2022-07-24 RX ADMIN — TIOTROPIUM BROMIDE INHALATION SPRAY 2 PUFF: 3.12 SPRAY, METERED RESPIRATORY (INHALATION) at 08:03

## 2022-07-24 RX ADMIN — NYSTATIN 500000 UNITS: 100000 SUSPENSION ORAL at 13:33

## 2022-07-24 RX ADMIN — DULOXETINE HYDROCHLORIDE 60 MG: 60 CAPSULE, DELAYED RELEASE ORAL at 21:47

## 2022-07-24 RX ADMIN — OXYCODONE 5 MG: 5 TABLET ORAL at 16:38

## 2022-07-24 RX ADMIN — GABAPENTIN 600 MG: 300 CAPSULE ORAL at 21:47

## 2022-07-24 RX ADMIN — DULOXETINE HYDROCHLORIDE 60 MG: 60 CAPSULE, DELAYED RELEASE ORAL at 09:01

## 2022-07-24 RX ADMIN — METRONIDAZOLE 500 MG: 500 INJECTION, SOLUTION INTRAVENOUS at 05:07

## 2022-07-24 RX ADMIN — CEFEPIME 2000 MG: 2 INJECTION, POWDER, FOR SOLUTION INTRAVENOUS at 18:46

## 2022-07-24 RX ADMIN — METRONIDAZOLE 500 MG: 500 INJECTION, SOLUTION INTRAVENOUS at 23:27

## 2022-07-24 RX ADMIN — NYSTATIN 500000 UNITS: 100000 SUSPENSION ORAL at 16:36

## 2022-07-24 RX ADMIN — TIZANIDINE 4 MG: 4 TABLET ORAL at 21:47

## 2022-07-24 RX ADMIN — SODIUM CHLORIDE, PRESERVATIVE FREE 10 ML: 5 INJECTION INTRAVENOUS at 21:47

## 2022-07-24 RX ADMIN — OXYCODONE 5 MG: 5 TABLET ORAL at 09:19

## 2022-07-24 RX ADMIN — Medication 500 ML: at 01:04

## 2022-07-24 RX ADMIN — PRASUGREL 10 MG: 10 TABLET, FILM COATED ORAL at 09:19

## 2022-07-24 RX ADMIN — NYSTATIN 500000 UNITS: 100000 SUSPENSION ORAL at 21:47

## 2022-07-24 ASSESSMENT — PAIN SCALES - WONG BAKER
WONGBAKER_NUMERICALRESPONSE: 6
WONGBAKER_NUMERICALRESPONSE: 6

## 2022-07-24 ASSESSMENT — PAIN DESCRIPTION - PAIN TYPE
TYPE: CHRONIC PAIN
TYPE: CHRONIC PAIN

## 2022-07-24 ASSESSMENT — PAIN DESCRIPTION - DESCRIPTORS
DESCRIPTORS: ACHING
DESCRIPTORS: ACHING

## 2022-07-24 ASSESSMENT — PAIN SCALES - GENERAL
PAINLEVEL_OUTOF10: 8
PAINLEVEL_OUTOF10: 9
PAINLEVEL_OUTOF10: 9

## 2022-07-24 ASSESSMENT — PAIN DESCRIPTION - LOCATION
LOCATION: BACK
LOCATION: BACK

## 2022-07-24 ASSESSMENT — PAIN DESCRIPTION - ORIENTATION
ORIENTATION: LOWER
ORIENTATION: LOWER

## 2022-07-24 NOTE — PLAN OF CARE
Problem: Discharge Planning  Goal: Discharge to home or other facility with appropriate resources  7/24/2022 0527 by Aung Canas RN  Outcome: Progressing  7/23/2022 1702 by Lester Childress RN  Outcome: Progressing  Flowsheets (Taken 7/23/2022 0815)  Discharge to home or other facility with appropriate resources: Identify barriers to discharge with patient and caregiver     Problem: Pain  Goal: Verbalizes/displays adequate comfort level or baseline comfort level  7/24/2022 0527 by Aung Canas RN  Outcome: Progressing  7/23/2022 1702 by Lester Childress RN  Outcome: Progressing     Problem: Confusion  Goal: Confusion, delirium, dementia, or psychosis is improved or at baseline  Description: INTERVENTIONS:  1. Assess for possible contributors to thought disturbance, including medications, impaired vision or hearing, underlying metabolic abnormalities, dehydration, psychiatric diagnoses, and notify attending LIP  2. [place] high risk fall precautions, as indicated  3. Provide frequent short contacts to provide reality reorientation, refocusing and direction  4. Decrease environmental stimuli, including noise as appropriate  5. Monitor and intervene to maintain adequate nutrition, hydration, elimination, sleep and activity  6. If unable to ensure safety without constant attention obtain sitter and review sitter guidelines with assigned personnel  7. Initiate Psychosocial CNS and Spiritual Care consult, as indicated  7/24/2022 0541 by Aung Canas RN  Outcome: Progressing  7/23/2022 1702 by Lester Childress RN  Outcome: Progressing     Problem: Skin/Tissue Integrity  Goal: Absence of new skin breakdown  Description: 1. Monitor for areas of redness and/or skin breakdown  2. Assess vascular access sites hourly  3. Every 4-6 hours minimum:  Change oxygen saturation probe site  4.   Every 4-6 hours:  If on nasal continuous positive airway pressure, respiratory therapy assess nares and determine need for appliance change or resting period.   7/24/2022 0527 by Trang Renteria RN  Outcome: Progressing  7/23/2022 1702 by Howard Plata RN  Outcome: Progressing     Problem: Safety - Adult  Goal: Free from fall injury  7/24/2022 0527 by Trang Renteria RN  Outcome: Progressing  7/23/2022 1702 by Howard Plata RN  Outcome: Progressing     Problem: ABCDS Injury Assessment  Goal: Absence of physical injury  7/24/2022 0527 by Trang Renteria RN  Outcome: Progressing  7/23/2022 1702 by Howard Plata RN  Outcome: Progressing     Problem: Chronic Conditions and Co-morbidities  Goal: Patient's chronic conditions and co-morbidity symptoms are monitored and maintained or improved  7/24/2022 0527 by Trang Renteria RN  Outcome: Progressing  7/23/2022 1702 by Howard Plata RN  Outcome: Progressing  Flowsheets (Taken 7/23/2022 0815)  Care Plan - Patient's Chronic Conditions and Co-Morbidity Symptoms are Monitored and Maintained or Improved: Monitor and assess patient's chronic conditions and comorbid symptoms for stability, deterioration, or improvement     Problem: Nutrition Deficit:  Goal: Optimize nutritional status  7/24/2022 0527 by Trang Renteria RN  Outcome: Progressing  7/23/2022 1702 by Howard Plata RN  Outcome: Progressing

## 2022-07-24 NOTE — PLAN OF CARE
Problem: Skin/Tissue Integrity  Goal: Absence of new skin breakdown  Description: 1. Monitor for areas of redness and/or skin breakdown  2. Assess vascular access sites hourly  3. Every 4-6 hours minimum:  Change oxygen saturation probe site  4. Every 4-6 hours:  If on nasal continuous positive airway pressure, respiratory therapy assess nares and determine need for appliance change or resting period. 7/24/2022 1650 by Maryam Forrester RN  Outcome: Not Progressing  7/24/2022 0527 by Brooks Andrews RN  Outcome: Progressing  Patient noted to have redness on her sacrum. Zinc cream and mepilex applied to her sacrum. Patient instructed on turning frequently to avoid bedsore/pressure. Patient turns and moves independently. Patient able to repeat instructions back to nurse.

## 2022-07-24 NOTE — PROGRESS NOTES
Hospitalist Progress Note      PCP: HECTOR Palumbo - CNP    Date of Admission: 7/18/2022    Chief Complaint:   Chief Complaint   Patient presents with    Shortness of Breath     At [facility] for rehab for pneumonia increased sob over the last few days. Pt on 4-5 lpm via nc all the time. Per EMS the cord was very long ems placed on 6lpm via nc and o2 sat came up to 99%         Hospital Course: Pt is an 78y.o. year-old female with a history of hyperlipidemia, fibromyalgia, chronic pain syndrome, severe COPD with chronic hypoxic respiratory failure requiring oxygen at 4 L/min via nasal cannula continuously. She presents to the emergency room for evaluation following a 2 to 3-day history of worsening shortness of breath. She was recently treated for Pseudomonas pneumonia and discharged to rehab. EMS reports that she was on a very long oxygen tube when they arrived. She was placed on 6 L nasal cannula and her oxygen saturation improved. In the emergency room she was found to have an abnormal EKG with inverted T waves in V2 through V5. Cardiology was consulted and asked that she be put on a heparin drip. Patient denies any current or recent chest pain. She is being admitted for further evaluation and treatment. Associated signs and symptoms do not include chest pain, lightheaded, dizziness, diaphoresis, edema, orthopnea, paroxysmal nocturnal dyspnea, fever or chills. No recent medication changes.      7/19  Feeling better, supplemental O2 5 L    Subjective:    Continues to have cough productive of sputum  CT scan from yesterday shows right lung fluid collection consistent with abscess    Medications:  Reviewed    Infusion Medications    sodium chloride      sodium chloride 5 mL/hr at 07/24/22 0506    dextrose       Scheduled Medications    sodium chloride  500 mL IntraVENous Once    azithromycin  500 mg Oral Daily    metroNIDAZOLE  500 mg IntraVENous Q8H    aspirin  81 mg Oral Daily    nystatin  5 mL Oral 4x Daily    prasugrel  10 mg Oral Daily    sodium chloride flush  5-40 mL IntraVENous 2 times per day    atorvastatin  40 mg Oral Nightly    tiZANidine  4 mg Oral Nightly    pantoprazole  40 mg Oral Daily    gabapentin  600 mg Oral BID    DULoxetine  60 mg Oral BID    insulin lispro  0-12 Units SubCUTAneous TID WC    insulin lispro  0-6 Units SubCUTAneous Nightly    cefepime  2,000 mg IntraVENous Q12H    mometasone-formoterol  2 puff Inhalation BID    And    tiotropium  2 puff Inhalation Daily     PRN Meds: sodium chloride, sodium chloride flush, sodium chloride, acetaminophen, oxyCODONE, HYDROcodone 5 mg - acetaminophen, perflutren lipid microspheres, perflutren lipid microspheres, acetaminophen, traZODone, ipratropium-albuterol, cetirizine, albuterol sulfate HFA, glucose, dextrose bolus **OR** dextrose bolus, glucagon (rDNA), dextrose, ondansetron, polyethylene glycol, acetaminophen **OR** acetaminophen      Intake/Output Summary (Last 24 hours) at 7/24/2022 1541  Last data filed at 7/24/2022 1331  Gross per 24 hour   Intake 720 ml   Output 800 ml   Net -80 ml         Physical Exam Performed:    BP (!) 102/57   Pulse (!) 105   Temp 98.3 °F (36.8 °C) (Oral)   Resp 21   Ht 5' 4\" (1.626 m)   Wt 137 lb 12.6 oz (62.5 kg)   SpO2 91%   BMI 23.65 kg/m²     General appearance: No apparent distress, appears stated age and cooperative. HEENT: Pupils equal, round, and reactive to light. Conjunctivae/corneas clear. Neck: Supple, with full range of motion. No jugular venous distention. Trachea midline. Respiratory: Right-sided rhonchi  Cardiovascular: Regular rate and rhythm with normal S1/S2 without murmurs, rubs or gallops. Abdomen: Soft, non-tender, non-distended with normal bowel sounds. Musculoskeletal: No clubbing, cyanosis or edema bilaterally. Full range of motion without deformity. Skin: Skin color, texture, turgor normal.  No rashes or lesions.   Neurologic:  Neurovascularly intact without any focal sensory/motor deficits. Cranial nerves: II-XII intact, grossly non-focal.  Psychiatric: Alert and oriented, thought content appropriate, normal insight  Capillary Refill: Brisk,3 seconds, normal   Peripheral Pulses: +2 palpable, equal bilaterally       Labs:   Recent Labs     07/22/22 0417 07/23/22  0422 07/24/22  0504   WBC 6.8 5.1 4.7   HGB 8.6* 7.7* 7.6*   HCT 25.0* 23.1* 22.1*    263 268       Recent Labs     07/22/22 0417 07/23/22  0422 07/24/22  0504   * 133* 135*   K 3.9 3.7 3.7   CL 99 102 104   CO2 26 26 24   BUN 12 11 9   CREATININE 0.7 0.6 0.5*   CALCIUM 7.5* 7.3* 7.1*       No results for input(s): AST, ALT, BILIDIR, BILITOT, ALKPHOS in the last 72 hours. No results for input(s): INR in the last 72 hours. No results for input(s): Elena Height in the last 72 hours. 7/19/2022 cardiac cath     HEMODYNAMICS:  Aortic pressure was 140/80;  LVEDP 16. There was no gradient between the left ventricle and aorta. ANGIOGRAM/CORONARY ARTERIOGRAM:        The left main coronary artery is normal .     The left anterior descending artery has mild diffuse disease . The left circumflex artery is normal              OM1 99% . The right coronary artery is normal .     SUMMARY:   Single vessel CAD      Urinalysis:      Lab Results   Component Value Date/Time    NITRU Negative 07/22/2022 06:31 AM    WBCUA 2 07/22/2022 06:31 AM    BACTERIA None Seen 07/22/2022 06:31 AM    RBCUA 5 07/22/2022 06:31 AM    BLOODU Negative 07/22/2022 06:31 AM    SPECGRAV 1.049 07/22/2022 06:31 AM    GLUCOSEU Negative 07/22/2022 06:31 AM       Radiology:  CT CHEST WO CONTRAST   Preliminary Result   1. Mixed consolidative and ground-glass opacities as well as interstitial   thickening throughout the majority of the right lung compatible with   pneumonia. 2. There is a cavity in the right upper lobe demonstrating an air-fluid level   measuring up to 9.7 cm concerning for abscess formation.    3. Trace right pleural effusion. 4. Prominent and enlarged mediastinal lymph nodes, likely reactive. 5. Stable 6 mm left lower lobe pulmonary nodule. RECOMMENDATIONS:   Fleischner Society guidelines for follow-up and management of incidentally   detected pulmonary nodules:      Single Solid Nodule:      Nodule size less than 6 mm   In a low-risk patient, no routine follow-up. In a high-risk patient, optional CT at 12 months. Nodule size equals 6-8 mm   In a low-risk patient, CT at 6-12 months, then consider CT at 18-24 months. In a high-risk patient, CT at 6-12 months, then CT at 18-24 months. Nodule size greater than 8 mm         In a low-risk patient, consider CT at 3 months, PET/CT, or tissue sampling. In a high-risk patient, consider CT at 3 months, PET/CT, or tissue sampling. Multiple Solid Nodules:      Nodule size less than 6 mm   In a low-risk patient, no routine follow-up. In a high-risk patient, optional CT at 12 months. Nodule size equals 6-8 mm   In a low-risk patient, CT at 3-6 months, then consider CT at 18-24 months. In a high-risk patient, CT at 3-6 months, then CT at 18-24 months. Nodule size greater than 8 mm   In a low-risk patient, CT at 3-6 months, then consider CT at 18-24 months. In a high-risk patient, CT at 3-6 months, then CT at 18-24 months. - Low risk patients include individuals with minimal or absent history of   smoking and other known risk factors. - High risk patients include individuals with a history or smoking or known   risk factors. Radiology 2017 http://pubs. rsna.org/doi/full/10.1148/radiol. 5216087594         XR CHEST PORTABLE   Final Result   1. Increased pneumonia throughout the right lung remaining most prominent in   the right upper lobe. 2. Emphysema better demonstrated on CT. 3. Possible trace right pleural effusion.          XR CHEST PORTABLE   Final Result   Improved aeration of the right chest with persistent consolidation in the mid   to upper right chest.                 Assessment/Plan:    Active Hospital Problems    Diagnosis     Acute on chronic respiratory failure with hypoxia and hypercapnia (HCC) [J96.21, J96.22]      Priority: Medium    Abnormal EKG [R94.31]      Priority: Medium    Chronic obstructive pulmonary disease (HCC) [J44.9]      Priority: Medium    COPD, severe (HCC) [J44.9]     Chronic pain syndrome [G89.4]     Hypercholesteremia [E78.00]     Fibromyalgia [M79.7]      Acute on chronic respiratory failure with hypoxia and hypercapnia (HCC) -  Worsened by right lung abscess  Continue antibiotics   Infectious disease consulted by pulmonary agree with this  On cefepime azithromycin and Flagyl  Not a great candidate for drainage as patient just received antiplatelet therapy for drug-eluting stent several days ago    Critical coronary artery disease single-vessel  Status post PCI to left circumflex yesterday with stent  Now on antiplatelet therapy    Anemia  No evidence of bleeding  Suspect anemia of chronic disease  Will check iron studies TIBC ferritin % and reticulocyte count  No indication to transfuse at this point  ? Benefit to EPO      COPD, severe ([address] - without acute exacerbation. Will provide Nebulizer treatments as needed and continue home medications. Patient will be monitored closely, and deep breathing and coughing will be encouraged while awake. Hyperlipidemia - No current evidence of Rhabdomyolysis or other adverse effects. Continue statin therapy while in the hospital     Chronic pain syndrome -continue her home pain med regimen     Fibromyalgia - provide supportive care       DVT Prophylaxis: heparin  Diet: ADULT DIET; Regular; Low Sodium (2 gm)  Code Status: Full Code    PT/OT Eval Status: requested    Dispo - return to NH when medically stable.   Patient is quite ill right now nothing likely in the next several days  Angela Banuelos MD

## 2022-07-24 NOTE — PROGRESS NOTES
RN received call from PCA. Patient with low BP. Verified with manual.  NP notified. See orders. Will continue to monitor.

## 2022-07-24 NOTE — PROGRESS NOTES
Pulmonary Progress Note    Date of Admission: 7/18/2022   LOS: 6 days     Chief Complaint   Patient presents with    Shortness of Breath     At [facility] for rehab for pneumonia increased sob over the last few days. Pt on 4-5 lpm via nc all the time. Per EMS the cord was very long ems placed on 6lpm via nc and o2 sat came up to 99%       Assessment/Plan:     Necrotizing pneumonia  Lung abscess  Pseudomonas pneumonia, right upper lobe  -CT scan yesterday demonstrates cavitary pneumonia in the right upper lobe  -Continue antipseudomonal coverage awaiting sensitivities also on Flagyl  -ID consult for long-term antibiotics    Acute hypoxemic respiratory failure  -Wean supplemental oxygen to goal saturation of >90%    Moderate COPD  -Dulera, Spiriva, albuterol as needed    I spent 36 minutes on this case today, >50% was face-to-face in discussion of the diagnosis and the coordination of care in regards to the treatment plan. All questions answered. 24 Hour Events/Subjective  No acute events overnight. CT scan confirmed lung abscess    ROS:   No nausea  No Vomiting  No chest pain      Intake/Output Summary (Last 24 hours) at 7/24/2022 0952  Last data filed at 7/24/2022 0427  Gross per 24 hour   Intake 480 ml   Output 1300 ml   Net -820 ml         PHYSICAL EXAM:   Blood pressure 118/65, pulse 98, temperature 98.4 °F (36.9 °C), temperature source Oral, resp. rate 25, height 5' 4\" (1.626 m), weight 137 lb 12.6 oz (62.5 kg), SpO2 93 %, not currently breastfeeding.'  Gen:  No acute distress. Eyes: PERRL. Anicteric sclera. No conjunctival injection. ENT: No discharge. Posterior oropharynx clear. External appearance of ears and nose normal.  Neck: Trachea midline. No mass   Resp:  No crackles. No wheezes. No rhonchi. No dullness on percussion. CV: Regular rate. Regular rhythm. No murmur or rub. No edema. GI: Soft, Non-tender. Non-distended. +BS  Skin: Warm, dry, w/o erythema. Lymph: No cervical or supraclavicular LAD. results for input(s): PROTIME, INR in the last 72 hours. APTT: No results for input(s): APTT in the last 72 hours. UA:  Recent Labs     07/22/22  0631   COLORU Yellow   PHUR 6.5   WBCUA 2   RBCUA 5*   BACTERIA None Seen   CLARITYU Clear   SPECGRAV 1.049   LEUKOCYTESUR Negative   UROBILINOGEN 0.2   BILIRUBINUR Negative   BLOODU Negative   GLUCOSEU Negative     No results for input(s): PH, PCO2, PO2 in the last 72 hours. Films:  Radiology Review:  Pertinent images / reports were reviewed as a part of this visit. CT CHEST WO CONTRAST  Narrative: EXAMINATION:  CT OF THE CHEST WITHOUT CONTRAST 7/23/2022 11:34 am    TECHNIQUE:  CT of the chest was performed without the administration of intravenous  contrast. Multiplanar reformatted images are provided for review. Automated  exposure control, iterative reconstruction, and/or weight based adjustment of  the mA/kV was utilized to reduce the radiation dose to as low as reasonably  achievable. COMPARISON:  CT chest performed June 20, 2022. HISTORY:  ORDERING SYSTEM PROVIDED HISTORY: Worsening RUL PNA, evolving abscess? TECHNOLOGIST PROVIDED HISTORY:  Reason for exam:-> Worsening RUL PNA, evolving abscess? Reason for Exam: Worsening RUL PNA, evolving abscess? FINDINGS:  Mediastinum: The esophagus is unremarkable. A few prominent and mildly  enlarged mediastinal lymph nodes, likely reactive. No pericardial effusion. Multivessel coronary artery disease. The thoracic  aorta is normal in course and caliber with scattered atherosclerotic disease. Lungs/pleura: There is interstitial thickening and mixed ground-glass and  consolidative opacities throughout the majority of the right lung. There is  a cavity within the right upper lobe demonstrating an air-fluid level  measuring 9.7 x 4.2 cm. Trace right pleural effusion. Biapical pleural  thickening. Stable 6 mm nodule in the anterior left lower lobe (series 3,  image 94).     Upper Abdomen: No acute abnormality of the visualized upper abdomen. Soft Tissues/Bones: Mild body wall edema. No acute osseous abnormality. Impression: 1. Mixed consolidative and ground-glass opacities as well as interstitial  thickening throughout the majority of the right lung compatible with  pneumonia. 2. There is a cavity in the right upper lobe demonstrating an air-fluid level  measuring up to 9.7 cm concerning for abscess formation. 3. Trace right pleural effusion. 4. Prominent and enlarged mediastinal lymph nodes, likely reactive. 5. Stable 6 mm left lower lobe pulmonary nodule. RECOMMENDATIONS:  Fleischner Society guidelines for follow-up and management of incidentally  detected pulmonary nodules:    Single Solid Nodule:    Nodule size less than 6 mm  In a low-risk patient, no routine follow-up. In a high-risk patient, optional CT at 12 months. Nodule size equals 6-8 mm  In a low-risk patient, CT at 6-12 months, then consider CT at 18-24 months. In a high-risk patient, CT at 6-12 months, then CT at 18-24 months. Nodule size greater than 8 mm    In a low-risk patient, consider CT at 3 months, PET/CT, or tissue sampling. In a high-risk patient, consider CT at 3 months, PET/CT, or tissue sampling. Multiple Solid Nodules:    Nodule size less than 6 mm  In a low-risk patient, no routine follow-up. In a high-risk patient, optional CT at 12 months. Nodule size equals 6-8 mm  In a low-risk patient, CT at 3-6 months, then consider CT at 18-24 months. In a high-risk patient, CT at 3-6 months, then CT at 18-24 months. Nodule size greater than 8 mm  In a low-risk patient, CT at 3-6 months, then consider CT at 18-24 months. In a high-risk patient, CT at 3-6 months, then CT at 18-24 months. - Low risk patients include individuals with minimal or absent history of  smoking and other known risk factors. - High risk patients include individuals with a history or smoking or known  risk factors.     Radiology

## 2022-07-25 LAB
ANION GAP SERPL CALCULATED.3IONS-SCNC: 8 MMOL/L (ref 3–16)
BASOPHILS ABSOLUTE: 0 K/UL (ref 0–0.2)
BASOPHILS RELATIVE PERCENT: 0.5 %
BUN BLDV-MCNC: 9 MG/DL (ref 7–20)
CALCIUM SERPL-MCNC: 7.5 MG/DL (ref 8.3–10.6)
CHLORIDE BLD-SCNC: 104 MMOL/L (ref 99–110)
CO2: 24 MMOL/L (ref 21–32)
CREAT SERPL-MCNC: <0.5 MG/DL (ref 0.6–1.2)
EOSINOPHILS ABSOLUTE: 0.3 K/UL (ref 0–0.6)
EOSINOPHILS RELATIVE PERCENT: 4.9 %
FERRITIN: 391.3 NG/ML (ref 15–150)
GFR AFRICAN AMERICAN: >60
GFR NON-AFRICAN AMERICAN: >60
GLUCOSE BLD-MCNC: 108 MG/DL (ref 70–99)
GLUCOSE BLD-MCNC: 112 MG/DL (ref 70–99)
GLUCOSE BLD-MCNC: 115 MG/DL (ref 70–99)
GLUCOSE BLD-MCNC: 88 MG/DL (ref 70–99)
GLUCOSE BLD-MCNC: 93 MG/DL (ref 70–99)
HCT VFR BLD CALC: 24.7 % (ref 36–48)
HEMOGLOBIN: 8.4 G/DL (ref 12–16)
IMMATURE RETIC FRACT: 0.45 (ref 0.21–0.37)
IRON SATURATION: 11 % (ref 15–50)
IRON: 10 UG/DL (ref 37–145)
LYMPHOCYTES ABSOLUTE: 0.6 K/UL (ref 1–5.1)
LYMPHOCYTES RELATIVE PERCENT: 11.3 %
MCH RBC QN AUTO: 31.2 PG (ref 26–34)
MCHC RBC AUTO-ENTMCNC: 33.9 G/DL (ref 31–36)
MCV RBC AUTO: 92.3 FL (ref 80–100)
MONOCYTES ABSOLUTE: 0.6 K/UL (ref 0–1.3)
MONOCYTES RELATIVE PERCENT: 11.5 %
NEUTROPHILS ABSOLUTE: 4 K/UL (ref 1.7–7.7)
NEUTROPHILS RELATIVE PERCENT: 71.8 %
PDW BLD-RTO: 14 % (ref 12.4–15.4)
PERFORMED ON: ABNORMAL
PERFORMED ON: NORMAL
PLATELET # BLD: 400 K/UL (ref 135–450)
PMV BLD AUTO: 6.9 FL (ref 5–10.5)
POTASSIUM REFLEX MAGNESIUM: 3.7 MMOL/L (ref 3.5–5.1)
RBC # BLD: 2.68 M/UL (ref 4–5.2)
RETICULOCYTE ABSOLUTE COUNT: 0.05 M/UL (ref 0.02–0.1)
RETICULOCYTE COUNT PCT: 1.82 % (ref 0.5–2.18)
SODIUM BLD-SCNC: 136 MMOL/L (ref 136–145)
TOTAL IRON BINDING CAPACITY: 88 UG/DL (ref 260–445)
WBC # BLD: 5.5 K/UL (ref 4–11)

## 2022-07-25 PROCEDURE — 6370000000 HC RX 637 (ALT 250 FOR IP): Performed by: NURSE PRACTITIONER

## 2022-07-25 PROCEDURE — 97530 THERAPEUTIC ACTIVITIES: CPT | Performed by: PHYSICAL THERAPIST

## 2022-07-25 PROCEDURE — 80048 BASIC METABOLIC PNL TOTAL CA: CPT

## 2022-07-25 PROCEDURE — 99291 CRITICAL CARE FIRST HOUR: CPT | Performed by: INTERNAL MEDICINE

## 2022-07-25 PROCEDURE — 97116 GAIT TRAINING THERAPY: CPT | Performed by: PHYSICAL THERAPIST

## 2022-07-25 PROCEDURE — 6370000000 HC RX 637 (ALT 250 FOR IP): Performed by: INTERNAL MEDICINE

## 2022-07-25 PROCEDURE — 83540 ASSAY OF IRON: CPT

## 2022-07-25 PROCEDURE — 1200000000 HC SEMI PRIVATE

## 2022-07-25 PROCEDURE — 6360000002 HC RX W HCPCS: Performed by: INTERNAL MEDICINE

## 2022-07-25 PROCEDURE — 36415 COLL VENOUS BLD VENIPUNCTURE: CPT

## 2022-07-25 PROCEDURE — 2700000000 HC OXYGEN THERAPY PER DAY

## 2022-07-25 PROCEDURE — 2580000003 HC RX 258: Performed by: INTERNAL MEDICINE

## 2022-07-25 PROCEDURE — 2500000003 HC RX 250 WO HCPCS: Performed by: INTERNAL MEDICINE

## 2022-07-25 PROCEDURE — 82728 ASSAY OF FERRITIN: CPT

## 2022-07-25 PROCEDURE — 94640 AIRWAY INHALATION TREATMENT: CPT

## 2022-07-25 PROCEDURE — 99223 1ST HOSP IP/OBS HIGH 75: CPT | Performed by: INTERNAL MEDICINE

## 2022-07-25 PROCEDURE — 85045 AUTOMATED RETICULOCYTE COUNT: CPT

## 2022-07-25 PROCEDURE — 94761 N-INVAS EAR/PLS OXIMETRY MLT: CPT

## 2022-07-25 PROCEDURE — 85025 COMPLETE CBC W/AUTO DIFF WBC: CPT

## 2022-07-25 PROCEDURE — 83550 IRON BINDING TEST: CPT

## 2022-07-25 RX ORDER — SODIUM CHLORIDE 9 MG/ML
25 INJECTION, SOLUTION INTRAVENOUS PRN
Status: DISCONTINUED | OUTPATIENT
Start: 2022-07-25 | End: 2022-07-25 | Stop reason: SDUPTHER

## 2022-07-25 RX ORDER — SODIUM CHLORIDE 0.9 % (FLUSH) 0.9 %
5-40 SYRINGE (ML) INJECTION PRN
Status: DISCONTINUED | OUTPATIENT
Start: 2022-07-25 | End: 2022-07-25 | Stop reason: SDUPTHER

## 2022-07-25 RX ORDER — SODIUM CHLORIDE 0.9 % (FLUSH) 0.9 %
5-40 SYRINGE (ML) INJECTION EVERY 12 HOURS SCHEDULED
Status: DISCONTINUED | OUTPATIENT
Start: 2022-07-25 | End: 2022-07-25 | Stop reason: SDUPTHER

## 2022-07-25 RX ORDER — TOBRAMYCIN INHALATION SOLUTION 300 MG/5ML
300 INHALANT RESPIRATORY (INHALATION) 2 TIMES DAILY
Status: DISPENSED | OUTPATIENT
Start: 2022-07-25 | End: 2022-08-08

## 2022-07-25 RX ORDER — LIDOCAINE HYDROCHLORIDE 10 MG/ML
5 INJECTION, SOLUTION EPIDURAL; INFILTRATION; INTRACAUDAL; PERINEURAL ONCE
Status: COMPLETED | OUTPATIENT
Start: 2022-07-25 | End: 2022-07-25

## 2022-07-25 RX ADMIN — OXYCODONE 5 MG: 5 TABLET ORAL at 11:59

## 2022-07-25 RX ADMIN — TIOTROPIUM BROMIDE INHALATION SPRAY 2 PUFF: 3.12 SPRAY, METERED RESPIRATORY (INHALATION) at 07:58

## 2022-07-25 RX ADMIN — MEROPENEM 1000 MG: 1 INJECTION, POWDER, FOR SOLUTION INTRAVENOUS at 23:17

## 2022-07-25 RX ADMIN — OXYCODONE 5 MG: 5 TABLET ORAL at 19:12

## 2022-07-25 RX ADMIN — TOBRAMYCIN 300 MG: 300 SOLUTION RESPIRATORY (INHALATION) at 20:29

## 2022-07-25 RX ADMIN — NYSTATIN 500000 UNITS: 100000 SUSPENSION ORAL at 17:24

## 2022-07-25 RX ADMIN — PANTOPRAZOLE SODIUM 40 MG: 40 TABLET, DELAYED RELEASE ORAL at 09:33

## 2022-07-25 RX ADMIN — GABAPENTIN 600 MG: 300 CAPSULE ORAL at 09:33

## 2022-07-25 RX ADMIN — NYSTATIN 500000 UNITS: 100000 SUSPENSION ORAL at 11:59

## 2022-07-25 RX ADMIN — DULOXETINE HYDROCHLORIDE 60 MG: 60 CAPSULE, DELAYED RELEASE ORAL at 09:33

## 2022-07-25 RX ADMIN — SODIUM CHLORIDE: 9 INJECTION, SOLUTION INTRAVENOUS at 05:02

## 2022-07-25 RX ADMIN — GABAPENTIN 600 MG: 300 CAPSULE ORAL at 21:22

## 2022-07-25 RX ADMIN — DULOXETINE HYDROCHLORIDE 60 MG: 60 CAPSULE, DELAYED RELEASE ORAL at 21:22

## 2022-07-25 RX ADMIN — CEFEPIME 2000 MG: 2 INJECTION, POWDER, FOR SOLUTION INTRAVENOUS at 06:51

## 2022-07-25 RX ADMIN — LIDOCAINE HYDROCHLORIDE 5 ML: 10 INJECTION, SOLUTION EPIDURAL; INFILTRATION; INTRACAUDAL; PERINEURAL at 19:00

## 2022-07-25 RX ADMIN — ATORVASTATIN CALCIUM 40 MG: 40 TABLET, FILM COATED ORAL at 21:22

## 2022-07-25 RX ADMIN — ASPIRIN 81 MG: 81 TABLET, COATED ORAL at 09:33

## 2022-07-25 RX ADMIN — METRONIDAZOLE 500 MG: 500 INJECTION, SOLUTION INTRAVENOUS at 05:03

## 2022-07-25 RX ADMIN — AZITHROMYCIN MONOHYDRATE 500 MG: 500 TABLET ORAL at 09:33

## 2022-07-25 RX ADMIN — MEROPENEM 1000 MG: 1 INJECTION, POWDER, FOR SOLUTION INTRAVENOUS at 13:15

## 2022-07-25 RX ADMIN — SODIUM CHLORIDE, PRESERVATIVE FREE 10 ML: 5 INJECTION INTRAVENOUS at 23:17

## 2022-07-25 RX ADMIN — MOMETASONE FUROATE AND FORMOTEROL FUMARATE DIHYDRATE 2 PUFF: 200; 5 AEROSOL RESPIRATORY (INHALATION) at 08:00

## 2022-07-25 RX ADMIN — METRONIDAZOLE 500 MG: 500 INJECTION, SOLUTION INTRAVENOUS at 12:01

## 2022-07-25 RX ADMIN — MOMETASONE FUROATE AND FORMOTEROL FUMARATE DIHYDRATE 2 PUFF: 200; 5 AEROSOL RESPIRATORY (INHALATION) at 20:29

## 2022-07-25 RX ADMIN — OXYCODONE 5 MG: 5 TABLET ORAL at 05:04

## 2022-07-25 RX ADMIN — NYSTATIN 500000 UNITS: 100000 SUSPENSION ORAL at 21:22

## 2022-07-25 RX ADMIN — SODIUM CHLORIDE, PRESERVATIVE FREE 10 ML: 5 INJECTION INTRAVENOUS at 09:34

## 2022-07-25 RX ADMIN — PRASUGREL 10 MG: 10 TABLET, FILM COATED ORAL at 09:36

## 2022-07-25 RX ADMIN — NYSTATIN 500000 UNITS: 100000 SUSPENSION ORAL at 09:32

## 2022-07-25 RX ADMIN — TIZANIDINE 4 MG: 4 TABLET ORAL at 21:22

## 2022-07-25 ASSESSMENT — PAIN DESCRIPTION - ORIENTATION
ORIENTATION: LOWER
ORIENTATION: LOWER

## 2022-07-25 ASSESSMENT — PAIN DESCRIPTION - LOCATION
LOCATION: BACK
LOCATION: BACK

## 2022-07-25 ASSESSMENT — PAIN SCALES - GENERAL
PAINLEVEL_OUTOF10: 8
PAINLEVEL_OUTOF10: 9
PAINLEVEL_OUTOF10: 7

## 2022-07-25 NOTE — PROGRESS NOTES
Hospitalist Progress Note      PCP: Dayna Saleh APRN - CNP    Date of Admission: 7/18/2022    Chief Complaint:   Chief Complaint   Patient presents with    Shortness of Breath     At [facility] for rehab for pneumonia increased sob over the last few days. Pt on 4-5 lpm via nc all the time. Per EMS the cord was very long ems placed on 6lpm via nc and o2 sat came up to 99%         Hospital Course: Pt is an 78y.o. year-old female with a history of hyperlipidemia, fibromyalgia, chronic pain syndrome, severe COPD with chronic hypoxic respiratory failure requiring oxygen at 4 L/min via nasal cannula continuously. She presents to the emergency room for evaluation following a 2 to 3-day history of worsening shortness of breath. She was recently treated for Pseudomonas pneumonia and discharged to rehab. EMS reports that she was on a very long oxygen tube when they arrived. She was placed on 6 L nasal cannula and her oxygen saturation improved. In the emergency room she was found to have an abnormal EKG with inverted T waves in V2 through V5. Cardiology was consulted and asked that she be put on a heparin drip. Patient denies any current or recent chest pain. She is being admitted for further evaluation and treatment. Associated signs and symptoms do not include chest pain, lightheaded, dizziness, diaphoresis, edema, orthopnea, paroxysmal nocturnal dyspnea, fever or chills. No recent medication changes.      7/19  Feeling better, supplemental O2 5 L    Subjective:    Very tired today still having productive cough        Medications:  Reviewed    Infusion Medications    sodium chloride      sodium chloride 5 mL/hr at 07/25/22 0502    dextrose       Scheduled Medications    meropenem  1,000 mg IntraVENous Q8H    tobramycin (PF)  300 mg Nebulization BID    sodium chloride  500 mL IntraVENous Once    aspirin  81 mg Oral Daily    nystatin  5 mL Oral 4x Daily    prasugrel  10 mg Oral Daily    sodium chloride flush  5-40 mL IntraVENous 2 times per day    atorvastatin  40 mg Oral Nightly    tiZANidine  4 mg Oral Nightly    pantoprazole  40 mg Oral Daily    gabapentin  600 mg Oral BID    DULoxetine  60 mg Oral BID    insulin lispro  0-12 Units SubCUTAneous TID WC    insulin lispro  0-6 Units SubCUTAneous Nightly    mometasone-formoterol  2 puff Inhalation BID    And    tiotropium  2 puff Inhalation Daily     PRN Meds: sodium chloride, sodium chloride flush, sodium chloride, acetaminophen, oxyCODONE, HYDROcodone 5 mg - acetaminophen, perflutren lipid microspheres, perflutren lipid microspheres, acetaminophen, traZODone, ipratropium-albuterol, cetirizine, albuterol sulfate HFA, glucose, dextrose bolus **OR** dextrose bolus, glucagon (rDNA), dextrose, ondansetron, polyethylene glycol, acetaminophen **OR** acetaminophen      Intake/Output Summary (Last 24 hours) at 7/25/2022 1732  Last data filed at 7/25/2022 1726  Gross per 24 hour   Intake 720 ml   Output 1900 ml   Net -1180 ml         Physical Exam Performed:    BP (!) 136/90   Pulse 100   Temp 97.6 °F (36.4 °C) (Oral)   Resp 20   Ht 5' 4\" (1.626 m)   Wt 136 lb 14.5 oz (62.1 kg)   SpO2 (!) 89%   BMI 23.50 kg/m²     General appearance: No apparent distress, appears stated age and cooperative. HEENT: Pupils equal, round, and reactive to light. Conjunctivae/corneas clear. Neck: Supple, with full range of motion. No jugular venous distention. Trachea midline. Respiratory: Right-sided rhonchi  Cardiovascular: Regular rate and rhythm with normal S1/S2 without murmurs, rubs or gallops. Abdomen: Soft, non-tender, non-distended with normal bowel sounds. Musculoskeletal: No clubbing, cyanosis or edema bilaterally. Full range of motion without deformity. Skin: Skin color, texture, turgor normal.  No rashes or lesions. Neurologic:  Neurovascularly intact without any focal sensory/motor deficits.  Cranial nerves: II-XII intact, grossly non-focal.  Psychiatric: Alert and oriented, thought content appropriate, normal insight  Capillary Refill: Brisk,3 seconds, normal   Peripheral Pulses: +2 palpable, equal bilaterally       Labs:   Recent Labs     07/23/22  0422 07/24/22  0504 07/25/22  0853   WBC 5.1 4.7 5.5   HGB 7.7* 7.6* 8.4*   HCT 23.1* 22.1* 24.7*    268 400       Recent Labs     07/23/22  0422 07/24/22  0504 07/25/22  0853   * 135* 136   K 3.7 3.7 3.7    104 104   CO2 26 24 24   BUN 11 9 9   CREATININE 0.6 0.5* <0.5*   CALCIUM 7.3* 7.1* 7.5*       No results for input(s): AST, ALT, BILIDIR, BILITOT, ALKPHOS in the last 72 hours. No results for input(s): INR in the last 72 hours. No results for input(s): Rosi Gazella in the last 72 hours. 7/19/2022 cardiac cath     HEMODYNAMICS:  Aortic pressure was 140/80;  LVEDP 16. There was no gradient between the left ventricle and aorta. ANGIOGRAM/CORONARY ARTERIOGRAM:        The left main coronary artery is normal .     The left anterior descending artery has mild diffuse disease . The left circumflex artery is normal              OM1 99% . The right coronary artery is normal .     SUMMARY:   Single vessel CAD      Urinalysis:      Lab Results   Component Value Date/Time    NITRU Negative 07/22/2022 06:31 AM    WBCUA 2 07/22/2022 06:31 AM    BACTERIA None Seen 07/22/2022 06:31 AM    RBCUA 5 07/22/2022 06:31 AM    BLOODU Negative 07/22/2022 06:31 AM    SPECGRAV 1.049 07/22/2022 06:31 AM    GLUCOSEU Negative 07/22/2022 06:31 AM       Radiology:  CT CHEST WO CONTRAST   Preliminary Result   1. Mixed consolidative and ground-glass opacities as well as interstitial   thickening throughout the majority of the right lung compatible with   pneumonia. 2. There is a cavity in the right upper lobe demonstrating an air-fluid level   measuring up to 9.7 cm concerning for abscess formation. 3. Trace right pleural effusion. 4. Prominent and enlarged mediastinal lymph nodes, likely reactive.    5. Stable 6 mm left lower lobe pulmonary nodule. RECOMMENDATIONS:   Fleischner Society guidelines for follow-up and management of incidentally   detected pulmonary nodules:      Single Solid Nodule:      Nodule size less than 6 mm   In a low-risk patient, no routine follow-up. In a high-risk patient, optional CT at 12 months. Nodule size equals 6-8 mm   In a low-risk patient, CT at 6-12 months, then consider CT at 18-24 months. In a high-risk patient, CT at 6-12 months, then CT at 18-24 months. Nodule size greater than 8 mm         In a low-risk patient, consider CT at 3 months, PET/CT, or tissue sampling. In a high-risk patient, consider CT at 3 months, PET/CT, or tissue sampling. Multiple Solid Nodules:      Nodule size less than 6 mm   In a low-risk patient, no routine follow-up. In a high-risk patient, optional CT at 12 months. Nodule size equals 6-8 mm   In a low-risk patient, CT at 3-6 months, then consider CT at 18-24 months. In a high-risk patient, CT at 3-6 months, then CT at 18-24 months. Nodule size greater than 8 mm   In a low-risk patient, CT at 3-6 months, then consider CT at 18-24 months. In a high-risk patient, CT at 3-6 months, then CT at 18-24 months. - Low risk patients include individuals with minimal or absent history of   smoking and other known risk factors. - High risk patients include individuals with a history or smoking or known   risk factors. Radiology 2017 http://pubs. rsna.org/doi/full/10.1148/radiol. 4683563485         XR CHEST PORTABLE   Final Result   1. Increased pneumonia throughout the right lung remaining most prominent in   the right upper lobe. 2. Emphysema better demonstrated on CT. 3. Possible trace right pleural effusion.          XR CHEST PORTABLE   Final Result   Improved aeration of the right chest with persistent consolidation in the mid   to upper right chest.                 Assessment/Plan:    VAISHNAVI/Shantelle Henry 0696 Problems    Diagnosis     Acute on chronic respiratory failure with hypoxia and hypercapnia (HCC) [J96.21, J96.22]      Priority: Medium    Abnormal EKG [R94.31]      Priority: Medium    Chronic obstructive pulmonary disease (HCC) [J44.9]      Priority: Medium    COPD, severe ([facility] [address] [J44.9]     Chronic pain syndrome [G89.4]     Hypercholesteremia [E78.00]     Fibromyalgia [M79.7]      Acute on chronic respiratory failure with hypoxia and hypercapnia (HCC) -  Still pretty fatigued from this infection  Worsened by right lung abscess  Continue antibiotics   Infectious disease consulted by pulmonary agree with this  On cefepime azithromycin and Flagyl  Not a great candidate for drainage as patient just received antiplatelet therapy for drug-eluting stent several days ago    Critical coronary artery disease single-vessel  Status post PCI to left circumflex yesterday with stent  Now on antiplatelet therapy    Anemia  No evidence of bleeding  Suspect anemia of chronic disease  Will check iron studies TIBC ferritin % and reticulocyte count  No indication to transfuse at this point  ? Benefit to EPO      COPD, severe ([facility] [address] - without acute exacerbation. Will provide Nebulizer treatments as needed and continue home medications. Patient will be monitored closely, and deep breathing and coughing will be encouraged while awake. Hyperlipidemia - No current evidence of Rhabdomyolysis or other adverse effects. Continue statin therapy while in the hospital     Chronic pain syndrome -continue her home pain med regimen     Fibromyalgia - provide supportive care       DVT Prophylaxis: heparin  Diet: ADULT DIET; Regular; Low Sodium (2 gm)  Code Status: Full Code    PT/OT Eval Status: requested    Dispo - return to NH when medically stable.   Patient is quite ill right now nothing likely in the next several days  Pj Alvarez MD

## 2022-07-25 NOTE — PROGRESS NOTES
Pulmonary Progress Note    Date of Admission: 7/18/2022   LOS: 7 days     Chief Complaint   Patient presents with    Shortness of Breath     At [facility] for rehab for pneumonia increased sob over the last few days. Pt on 4-5 lpm via nc all the time. Per EMS the cord was very long ems placed on 6lpm via nc and o2 sat came up to 99%       Assessment/Plan:     Necrotizing pneumonia  Pseudomonas pneumonia, right upper lobe  -Abscess right upper lobe  -On cefepime/Flagyl  -ID consulted    Acute hypoxemic respiratory failure  -Wean supplemental oxygen to goal saturation of >90%    Moderate COPD  -Dulera, Spiriva, albuterol as needed      24 Hour Events/Subjective  No acute events overnight. Dyspnea slowly improving. Remains on 6 L of oxygen. CT scan confirmed lung abscess    ROS:   No nausea  No Vomiting  No chest pain      Intake/Output Summary (Last 24 hours) at 7/25/2022 0749  Last data filed at 7/25/2022 0432  Gross per 24 hour   Intake 720 ml   Output 1300 ml   Net -580 ml         PHYSICAL EXAM:   Blood pressure 126/74, pulse 98, temperature 98.6 °F (37 °C), temperature source Oral, resp. rate 19, height 5' 4\" (1.626 m), weight 136 lb 14.5 oz (62.1 kg), SpO2 92 %, not currently breastfeeding.'  Gen:  No acute distress. Eyes: PERRL. Anicteric sclera. No conjunctival injection. ENT: No discharge. Posterior oropharynx clear. External appearance of ears and nose normal.  Neck: Trachea midline. No mass   Resp:  No crackles. No wheezes. No rhonchi. No dullness on percussion. CV: Regular rate. Regular rhythm. No murmur or rub. No edema. GI: Soft, Non-tender. Non-distended. +BS  Skin: Warm, dry, w/o erythema. Lymph: No cervical or supraclavicular LAD. M/S: No cyanosis. No clubbing. Neuro:  no focal neurologic deficit. Moves all extremities  Psych: Awake and alert, Oriented x 3. Judgement and insight appropriate. Mood stable.       Medications:    Scheduled Meds:   sodium chloride  500 mL IntraVENous Once azithromycin  500 mg Oral Daily    metroNIDAZOLE  500 mg IntraVENous Q8H    aspirin  81 mg Oral Daily    nystatin  5 mL Oral 4x Daily    prasugrel  10 mg Oral Daily    sodium chloride flush  5-40 mL IntraVENous 2 times per day    atorvastatin  40 mg Oral Nightly    tiZANidine  4 mg Oral Nightly    pantoprazole  40 mg Oral Daily    gabapentin  600 mg Oral BID    DULoxetine  60 mg Oral BID    insulin lispro  0-12 Units SubCUTAneous TID WC    insulin lispro  0-6 Units SubCUTAneous Nightly    cefepime  2,000 mg IntraVENous Q12H    mometasone-formoterol  2 puff Inhalation BID    And    tiotropium  2 puff Inhalation Daily       Continuous Infusions:   sodium chloride      sodium chloride 5 mL/hr at 07/25/22 0502    dextrose         PRN Meds:  sodium chloride, sodium chloride flush, sodium chloride, acetaminophen, oxyCODONE, HYDROcodone 5 mg - acetaminophen, perflutren lipid microspheres, perflutren lipid microspheres, acetaminophen, traZODone, ipratropium-albuterol, cetirizine, albuterol sulfate HFA, glucose, dextrose bolus **OR** dextrose bolus, glucagon (rDNA), dextrose, ondansetron, polyethylene glycol, acetaminophen **OR** acetaminophen    Labs reviewed:  CBC:   Recent Labs     07/23/22  0422 07/24/22  0504   WBC 5.1 4.7   HGB 7.7* 7.6*   HCT 23.1* 22.1*   MCV 93.9 93.2    268     BMP:   Recent Labs     07/23/22  0422 07/24/22  0504   * 135*   K 3.7 3.7    104   CO2 26 24   BUN 11 9   CREATININE 0.6 0.5*     LIVER PROFILE: No results for input(s): AST, ALT, LIPASE, BILIDIR, BILITOT, ALKPHOS in the last 72 hours. Invalid input(s): AMYLASE,  ALB  PT/INR: No results for input(s): PROTIME, INR in the last 72 hours. APTT: No results for input(s): APTT in the last 72 hours.   UA:  No results for input(s): NITRITE, COLORU, PHUR, LABCAST, WBCUA, RBCUA, MUCUS, TRICHOMONAS, YEAST, BACTERIA, CLARITYU, SPECGRAV, LEUKOCYTESUR, UROBILINOGEN, BILIRUBINUR, BLOODU, GLUCOSEU, AMORPHOUS in the last 72

## 2022-07-25 NOTE — CONSULTS
Infectious Diseases Inpatient Consult Note      Reason for Consult:   Pseudomonas PNA and possible Lung abscess     Requesting Physician:  Cj Enriquez     Primary Care Physician:  Dania Fairbanks, HECTOR - CNP    History Obtained From:  Epic and pt , family     CHIEF COMPLAINT:     Chief Complaint   Patient presents with    Shortness of Breath     At [facility] for rehab for pneumonia increased sob over the last few days. Pt on 4-5 lpm via nc all the time. Per EMS the cord was very long ems placed on 6lpm via nc and o2 sat came up to 99%         HISTORY OF PRESENT ILLNESS:  78 y. o. woman with a past medical history of COPD, depression, chronic hypoxic respiratory failure now on   4 L of oxygen via nasal cannula, diabetes recent admission for COPD exacerbation and pneumonia diagnosed to have Pseudomonas based on sputum studies. She was on IV cefepime transition to oral levofloxacin on discharge. She was sent to [address] rehab facility and now admitted because of worsening shortness of breath increased sputum production. Sputum culture again on this admission positive for Pseudomonas with some resistance pattern, it is now become resistant to quinolones. CT chest on this admission with progressive pneumonia right upper lobe cavitary lesion possible lung abscess. Extensive consolidation  see images -  This is a new finding on the CT chest compared to her previous CT chest on 6/20/22. Given the necrotizing pneumonia with Pseudomonas infection and lung abscess formation we are consulted for recommendations.       Past Medical History:    Past Medical History:   Diagnosis Date    Chronic pain syndrome     Colorectal polyps     COPD (chronic obstructive pulmonary disease) (HCC)     CTS (carpal tunnel syndrome)     BILATERAL    DDD (degenerative disc disease), lumbar     Depression     Family hx of colon cancer     Fibromyalgia     Hyperlipemia     IBS (irritable bowel syndrome)     Lumbar spondylosis     New onset type 2 diabetes mellitus ([facility] [address] 2/3/2020    Urticaria, chronic        Past Surgical History:    Past Surgical History:   Procedure Laterality Date    CARPAL TUNNEL RELEASE Bilateral     CERVICAL POLYP REMOVAL  9/2004    INTRACAPSULAR CATARACT EXTRACTION Left 6/23/2020    Phacoemulsification with intraocular lens implant performed by Cody Meyers MD at [address] Right 7/14/2020    Phacoemulsification with intraocular lens implant performed by Cody Meyers MD at [address]      patient denies        Current Medications:    No outpatient medications have been marked as taking for the 7/18/22 encounter [facility] Encounter). Allergies:  Patient has no known allergies.     Immunizations :   Immunization History   Administered Date(s) Administered    COVID-19, MODERNA BLUE border, Primary or Immunocompromised, (age 12y+), IM, 100 mcg/0.5mL 03/08/2021, 04/05/2021    COVID-19, MODERNA Booster BLUE border, (age 18y+), IM, 50mcg/0.25mL 12/01/2021    Influenza Vaccine, unspecified formulation 01/10/2017    Influenza, High Dose (Fluzone 65 yrs and older) 11/04/2013, 01/21/2016, 09/01/2017, 10/30/2018, 09/18/2020    Influenza, Quadv, adjuvanted, 65 yrs +, IM, PF (Fluad) 09/27/2021    Influenza, Triv, inactivated, subunit, adjuvanted, IM (Fluad 65 yrs and older) 09/13/2019    Pneumococcal Conjugate 13-valent ([id number]) 01/19/2015    Pneumococcal Conjugate Vaccine 01/10/2017    Pneumococcal Polysaccharide ([id number]) 10/12/2012    Tdap (Boostrix, Adacel) 07/17/2007    Zoster Recombinant (Shingrix) 11/21/2019         Social History:     Social History     Tobacco Use    Smoking status: Every Day     Packs/day: 1.00     Years: 55.00     Pack years: 55.00     Types: Cigarettes     Start date: 1/1/1962    Smokeless tobacco: Never    Tobacco comments:     almost ready to quit   Vaping Use    Vaping Use: Never used Substance Use Topics    Alcohol use: No     Alcohol/week: 0.0 standard drinks    Drug use: No     Social History     Tobacco Use   Smoking Status Every Day    Packs/day: 1.00    Years: 55.00    Pack years: 55.00    Types: Cigarettes    Start date: 1/1/1962   Smokeless Tobacco Never   Tobacco Comments    almost ready to quit      Family History   Problem Relation Age of Onset    Cancer Father         COLON     Alzheimer's Disease Mother     Heart Disease Brother     Heart Failure Brother     Diabetes Daughter     Diabetes Daughter     Diabetes Daughter           REVIEW OF SYSTEMS:      Constitutional:  negative for fevers, chills, night sweats  Eyes:  negative for blurred vision, eye discharge, visual disturbance   HEENT:  negative for hearing loss, ear drainage,nasal congestion  Respiratory:  negative for cough,+  shortness of breath + or hemoptysis   Cardiovascular:  negative for chest pain, palpitations, syncope  Gastrointestinal:  negative for nausea, vomiting, diarrhea, constipation, abdominal pain  Genitourinary:  negative for frequency, dysuria, urinary incontinence, hematuria  Hematologic/Lymphatic:  negative for easy bruising, bleeding and lymphadenopathy  Allergic/Immunologic:  negative for recurrent infections, angioedema, anaphylaxis   Endocrine:  negative for weight changes, polyuria, polydipsia and polyphagia  Musculoskeletal:  negative for joint  pain, swelling, decreased range of motion  Integumentary: No rashes, skin lesions  Neurological:  negative for headaches, slurred speech, unilateral weakness  Psychiatric: negative for hallucinations,confusion,agitation.      PHYSICAL EXAM:      Vitals:  T max  99.2   /70   Pulse 100   Temp 97.6 °F (36.4 °C) (Oral)   Resp 20   Ht 5' 4\" (1.626 m)   Wt 136 lb 14.5 oz (62.1 kg)   SpO2 (!) 89%   BMI 23.50 kg/m²     General Appearance: alert,in some  acute distress, ++  pallor, no icterus  on nasal cannula+ skin changes from smoking ++   Skin: warm and dry, no rash or erythema  Head: normocephalic and atraumatic  Eyes: pupils equal, round, and reactive to light, conjunctivae normal  ENT: tympanic membrane, external ear and ear canal normal bilaterally, nose without deformity, nasal mucosa and turbinates normal without polyps  Neck: supple and non-tender without mass, no thyromegaly  no cervical lymphadenopathy  Pulmonary/Chest:  Rt UL coarse crepts+ BRONCHIAL BREATHING + =-   wheezes, rales or rhonchi, normal air movement, in some respiratory distress  Cardiovascular: normal rate, regular rhythm, normal S1 and S2, no murmurs, rubs, clicks, or gallops, no carotid bruits  Abdomen: soft, non-tender, non-distended, normal bowel sounds, no masses or organomegaly  Extremities: no cyanosis, clubbing or edema  Musculoskeletal: normal range of motion, no joint swelling, deformity or tenderness  Integumentary: No rashes, no abnormal skin lesions, no petechiae  Neurologic: reflexes normal and symmetric, no cranial nerve deficit  Psych:  Orientation, sensorium, mood normal   Lines: IV    DATA:    CBC:   Lab Results   Component Value Date    WBC 5.5 07/25/2022    HGB 8.4 (L) 07/25/2022    HCT 24.7 (L) 07/25/2022    MCV 92.3 07/25/2022     07/25/2022     RENAL:   Lab Results   Component Value Date    CREATININE <0.5 (L) 07/25/2022    BUN 9 07/25/2022     07/25/2022    K 3.7 07/25/2022     07/25/2022    CO2 24 07/25/2022     SED RATE: No results found for: SEDRATE  CK: No results found for: CKTOTAL  CRP: No results found for: CRP  Hepatic Function Panel:   Lab Results   Component Value Date/Time    ALKPHOS 88 07/18/2022 05:20 PM    ALT 65 07/18/2022 05:20 PM    AST 61 07/18/2022 05:20 PM    PROT 5.6 07/18/2022 05:20 PM    PROT 6.9 10/12/2012 11:00 AM    BILITOT 0.5 07/18/2022 05:20 PM    LABALBU 2.4 07/18/2022 05:20 PM     UA:  Lab Results   Component Value Date/Time    COLORU Yellow 07/22/2022 06:31 AM    CLARITYU Clear 07/22/2022 06:31 AM    GLUCOSEU Negative 07/22/2022 06:31 AM    BILIRUBINUR Negative 07/22/2022 06:31 AM    BILIRUBINUR neg 10/17/2019 11:50 AM    KETUA TRACE 07/22/2022 06:31 AM    SPECGRAV 1.049 07/22/2022 06:31 AM    BLOODU Negative 07/22/2022 06:31 AM    PHUR 6.5 07/22/2022 06:31 AM    PROTEINU 30 07/22/2022 06:31 AM    UROBILINOGEN 0.2 07/22/2022 06:31 AM    NITRU Negative 07/22/2022 06:31 AM    LEUKOCYTESUR Negative 07/22/2022 06:31 AM    LABMICR YES 07/22/2022 06:31 AM    URINETYPE NotGiven 07/22/2022 06:31 AM      Urine Microscopic:   Lab Results   Component Value Date/Time    LABCAST 10-20 Hyaline 01/10/2017 06:19 PM    BACTERIA None Seen 07/22/2022 06:31 AM    COMU see below 07/03/2022 03:11 PM    HYALCAST 2 07/22/2022 06:31 AM    WBCUA 2 07/22/2022 06:31 AM    RBCUA 5 07/22/2022 06:31 AM    EPIU 1 07/22/2022 06:31 AM     Urine Reflex to Culture:   Lab Results   Component Value Date/Time    URRFLXCULT Not Indicated 07/03/2022 03:11 PM         MICRO: cultures reviewed and updated by me     Component 7/19/22 2030    CULTURE, RESPIRATORY Light growth normal respiratory fabiana with Abnormal     Gram Stain Result 2+ Gram positive cocci   2+ WBC's (Polymorphonuclear)   1+ Epithelial Cells    Organism Pseudomonas aeruginosa Abnormal     CULTURE, RESPIRATORY Light growth    Resulting Agency [address] [facility]          Susceptibility      Pseudomonas aeruginosa (1)    Antibiotic Interpretation Microscan  Method Status    cefepime Sensitive 8 mcg/mL BACTERIAL SUSCEPTIBILITY PANEL BY TIERRA     ciprofloxacin Resistant >2 mcg/mL BACTERIAL SUSCEPTIBILITY PANEL BY TIERRA     gentamicin Sensitive <=4 mcg/mL BACTERIAL SUSCEPTIBILITY PANEL BY TIERRA     meropenem Sensitive <=1 mcg/mL BACTERIAL SUSCEPTIBILITY PANEL BY TIERRA     piperacillin-tazobactam Sensitive <=16 mcg/mL BACTERIAL SUSCEPTIBILITY PANEL BY TIERRA     tobramycin Sensitive <=4 mcg/mL BACTERIAL SUSCEPTIBILITY PANEL BY TIERRA     levofloxacin Resistant >32 ug/ml BACTERIAL SUSCEPTIBILITY PANEL BY E-TEST Blood Culture:   Lab Results   Component Value Date/Time    BC No Growth after 4 days of incubation. 07/18/2022 06:41 PM    BLOODCULT2 No Growth after 4 days of incubation. 07/18/2022 06:50 PM       Procedure Component Value Units Date/Time   Culture, Respiratory [[id number]] (Abnormal)  Collected: 07/22/22 1200   Order Status: Completed Specimen: Sputum Expectorated Updated: 07/24/22 0801    CULTURE, RESPIRATORY Rare growth normal respiratory fabiana with Abnormal     Gram Stain Result No Epithelial Cells seen   3+ WBC's (Polymorphonuclear)   No organisms seen     Organism Pseudomonas aeruginosa Abnormal     CULTURE, RESPIRATORY Light growth   Narrative:     ORDER#: [id number]                          ORDERED BY: CIRA KEBEDE   SOURCE: Sputum Expectorated                COLLECTED:  07/22/22 12:00   ANTIBIOTICS AT GURWINDER.:                      RECEIVED :  07/22/22 12:22   Respiratory Culture [[id number]] (Abnormal)  Collected: 07/19/22 2030   Order Status: Completed Specimen: Sputum Expectorated Updated: 07/23/22 0748    CULTURE, RESPIRATORY Light growth normal respiratory fabiana with Abnormal     Gram Stain Result 2+ Gram positive cocci   2+ WBC's (Polymorphonuclear)   1+ Epithelial Cells     Organism Pseudomonas aeruginosa Abnormal     CULTURE, RESPIRATORY Light growth   Narrative:     ORDER#: [id number]                          ORDERED BY: FARZANA GAN   SOURCE: Sputum Expectorated                COLLECTED:  07/19/22 20:30   ANTIBIOTICS AT GURWINDER.:                      RECEIVED :  07/19/22 21:19   Culture, Blood 1 [[id number]] Collected: 07/18/22 1841   Order Status: Completed Specimen: Blood Updated: 07/22/22 2015    Blood Culture, Routine No Growth after 4 days of incubation.    Narrative:     ORDER#: [id number]                          ORDERED BY: Joe Parson   SOURCE: Blood                              COLLECTED:  07/18/22 18:41   ANTIBIOTICS AT GURWINDER.:                      RECEIVED :  07/18/22 18:56   If child <=2 yrs old please draw pediatric bottle. ~Blood Culture 1   Culture, Blood 2 [[id number]] Collected: 07/18/22 1850   Order Status: Completed Specimen: Blood Updated: 07/22/22 2015    Culture, Blood 2 No Growth after 4 days of incubation. Narrative:     ORDER#: [id number]                          ORDERED BY: Jordy Kuo   SOURCE: Blood                              COLLECTED:  07/18/22 18:50   ANTIBIOTICS AT GURWINDER.:                      RECEIVED :  07/18/22 18:56   If child <=2 yrs old please draw pediatric bottle. ~Blood Culture #2   Strep Pneumoniae Antigen [[id number]] Collected: 07/20/22 1020   Order Status: Completed Specimen: Urine, clean catch Updated: 07/21/22 0841    STREP PNEUMONIAE ANTIGEN, URINE --    Presumptive Negative   Presumptive negative suggests no current or recent   pneumococcal infection. Infection due to Strep pneumoniae   cannot be ruled out since the antigen present in the sample   may be below the detection limit of the test.   Normal Range:Presumptive Negative    Narrative:     ORDER#: [id number]                          ORDERED BY: FARZANA GAN   SOURCE: Urine Clean Catch                  COLLECTED:  07/20/22 10:20   ANTIBIOTICS AT GURWINDER.:                      RECEIVED :  07/20/22 10:27   Legionella antigen, urine [[id number]] Collected: 07/20/22 1020   Order Status: Completed Specimen: Urine, catheter Updated: 07/21/22 0840    L. pneumophila Serogp 1 Ur Ag --    Presumptive Negative   No Legionella pneumophila serogroup 1 antigens detected. A negative result does not exclude infection with   Legionella pneumophila serogroup 1 nor does it rule out   other microbial-caused respiratory infections or   disease caused by other serogroups of   Legionella pneumophila.    Normal Range: Presumptive Negative    Narrative:     ORDER#: [id number]                          ORDERED BY: FARZANA GAN   SOURCE: Urine Catheter                     COLLECTED:  07/20/22 10:20   ANTIBIOTICS AT GURWINDER.: RECEIVED :  07/20/22 10:28   Sputum gram stain [[id number]] Collected: 07/19/22 2030   Order Status: Canceled Specimen: Sputum Expectorated    Rapid influenza A/B antigens [[id number]] Collected: 07/19/22 0210   Order Status: Completed Specimen: Nares Updated: 07/19/22 0245    Rapid Influenza A Ag Negative    Rapid Influenza B Ag Negative   COVID-19, Rapid [[id number]] Collected: 07/18/22 1825   Order Status: Completed Specimen: Nasopharyngeal Swab Updated: 07/18/22 1854    SARS-CoV-2, NAAT Not Detected    Comment: Rapid NAAT:   Negative results should be treated as presumptive and,   if inconsistent with clinical signs and symptoms or necessary for   patient management, should be tested with an alternative molecular   assay. Negative results do not preclude SARS-CoV-2 infection and   should not be used as the sole basis for patient management decisions. This test has been authorized by the FDA under an Emergency Use   Authorization (EUA) for use by authorized laboratories. Fact sheet for Healthcare Providers:   Jt   Fact sheet for Patients: Jt     METHODOLOGY: Isothermal Nucleic Acid Amplification           Viral Culture:    Lab Results   Component Value Date/Time    COVID19 Not Detected 07/18/2022 06:25 PM     Urine Culture: No results for input(s): Etienne Bijou in the last 72 hours.     Scheduled Meds:   sodium chloride  500 mL IntraVENous Once    metroNIDAZOLE  500 mg IntraVENous Q8H    aspirin  81 mg Oral Daily    nystatin  5 mL Oral 4x Daily    prasugrel  10 mg Oral Daily    sodium chloride flush  5-40 mL IntraVENous 2 times per day    atorvastatin  40 mg Oral Nightly    tiZANidine  4 mg Oral Nightly    pantoprazole  40 mg Oral Daily    gabapentin  600 mg Oral BID    DULoxetine  60 mg Oral BID    insulin lispro  0-12 Units SubCUTAneous TID     insulin lispro  0-6 Units SubCUTAneous Nightly mometasone-formoterol  2 puff Inhalation BID    And    tiotropium  2 puff Inhalation Daily       Continuous Infusions:   sodium chloride      sodium chloride 5 mL/hr at 07/25/22 0502    dextrose         PRN Meds:  sodium chloride, sodium chloride flush, sodium chloride, acetaminophen, oxyCODONE, HYDROcodone 5 mg - acetaminophen, perflutren lipid microspheres, perflutren lipid microspheres, acetaminophen, traZODone, ipratropium-albuterol, cetirizine, albuterol sulfate HFA, glucose, dextrose bolus **OR** dextrose bolus, glucagon (rDNA), dextrose, ondansetron, polyethylene glycol, acetaminophen **OR** acetaminophen    Imaging:   CT CHEST WO CONTRAST   Preliminary Result   1. Mixed consolidative and ground-glass opacities as well as interstitial   thickening throughout the majority of the right lung compatible with   pneumonia. 2. There is a cavity in the right upper lobe demonstrating an air-fluid level   measuring up to 9.7 cm concerning for abscess formation. 3. Trace right pleural effusion. 4. Prominent and enlarged mediastinal lymph nodes, likely reactive. 5. Stable 6 mm left lower lobe pulmonary nodule. RECOMMENDATIONS:   Fleischner Society guidelines for follow-up and management of incidentally   detected pulmonary nodules:      Single Solid Nodule:      Nodule size less than 6 mm   In a low-risk patient, no routine follow-up. In a high-risk patient, optional CT at 12 months. Nodule size equals 6-8 mm   In a low-risk patient, CT at 6-12 months, then consider CT at 18-24 months. In a high-risk patient, CT at 6-12 months, then CT at 18-24 months. Nodule size greater than 8 mm         In a low-risk patient, consider CT at 3 months, PET/CT, or tissue sampling. In a high-risk patient, consider CT at 3 months, PET/CT, or tissue sampling. Multiple Solid Nodules:      Nodule size less than 6 mm   In a low-risk patient, no routine follow-up.    In a high-risk patient, optional CT at 15 months. Nodule size equals 6-8 mm   In a low-risk patient, CT at 3-6 months, then consider CT at 18-24 months. In a high-risk patient, CT at 3-6 months, then CT at 18-24 months. Nodule size greater than 8 mm   In a low-risk patient, CT at 3-6 months, then consider CT at 18-24 months. In a high-risk patient, CT at 3-6 months, then CT at 18-24 months. - Low risk patients include individuals with minimal or absent history of   smoking and other known risk factors. - High risk patients include individuals with a history or smoking or known   risk factors. Radiology 2017 http://pubs. rsna.org/doi/full/10.1148/radiol. 3310968589         XR CHEST PORTABLE   Final Result   1. Increased pneumonia throughout the right lung remaining most prominent in   the right upper lobe. 2. Emphysema better demonstrated on CT. 3. Possible trace right pleural effusion. XR CHEST PORTABLE   Final Result   Improved aeration of the right chest with persistent consolidation in the mid   to upper right chest.             All pertinent images and reports for the current Hospitalization were reviewed by me.     IMPRESSION:    Patient Active Problem List   Diagnosis    Osteopenia-last dexa 2009 repeat 2015 (-2.4 hip)--advised tx    Colon polyp, hyperplastic,-(done 3/11-repeat 3-5 yrs--dr Baldwin Leyden)    Family history of colon cancer    CTS (carpal tunnel syndrome)BILATERAL-s/p surgical repair--resolved     Postmenopausal status-last mammogram 2/15 wnl last pap 12/13--wnl    Nondependent alcohol abuse, in remission    Urticaria, chronic    Tobacco abuse-advised to quit--lung cancer screening neg 5/18--repeat low dose ct 1 yr    Chronic back pain-(djd) saw dr Gerard Giraldo afford surgery--seeing dr Li Acevedo for pain meds    Fibromyalgia    Hypercholesteremia    Lumbar spondylosis    Vitamin D deficiency--severe--started 50,000 iu 2x/ wk 11/13    Insomnia--on elevil w help    Constipation--on daily miralax Depression    Chronic pain syndrome    Muscle cramping    Acute on chronic respiratory failure with hypercapnia (HCC)    ROLY (acute kidney injury) ([facility] [address]    Severe sepsis (HCC)    Gastroesophageal reflux disease    COPD, severe (HCC)    Chronic hypoxemic respiratory failure (HCC)    Degeneration of lumbar or lumbosacral intervertebral disc    Trochanteric bursitis of right hip    COPD exacerbation (HCC)    Diabetes ([facility] [address]    Controlled type 2 diabetes mellitus without complication, without long-term current use of insulin (HCC)    Age-related osteoporosis without current pathological fracture- started alendronate 9/2021    Pulmonary nodule seen on imaging study    Pneumonia of right upper lobe due to infectious organism    General weakness    Elevated lactic acid level    Community acquired pneumonia of right lung    Chronic obstructive pulmonary disease (HCC)    Acute respiratory failure with hypoxia (HCC)    Acute on chronic respiratory failure with hypoxia and hypercapnia (HCC)    Abnormal EKG     Severe necrotizing Pseudomonas pneumonia  Right upper lobe cavitary lesion  Right right lung evolving abscess  Right lung dense consolidation of  Pseudomonas pneumonia developing some resistance  COPD exacerbation  Hypoxic respiratory failure  Coronary artery disease  Status post cardiac cath  Chronic smoking  Abnormal CT chest  BNP elevation  COVID-19 negative      Unfortunately she developed progressive changes with dense consolidation and lung abscess secondary to Pseudomonas pneumonia. Pseudomonas appears to have developed resistance to quinolones while on therapy this is concerning.   CT chest on this admission grossly abnormal and definitely there is progressive changes given the severity of the -pneumonia will need IV antibiotics    OPAT d/w pt she needs to QUIT smoking d/w pt and her family         Labs, Microbiology, Radiology and pertinent results from current hospitalization and care every where were reviewed by me as a part of the consultation. PLAN :  IV Meropenem 1 gm q 8 HR X 21 DAYS  Inh Tobramycin Neb Q 12 hRS  Picc line   OPAT d/w pt  QUIT smoking   Will repeat CT chest in  3 weeks  Cont supportive care  CT images reviewed see above        Discussed with patient/Family and Nursing   Risk of Complications/Morbidity: High      Illness(es)/ Infection present that pose threat to bodily function. There is potential for severe exacerbation of infection/side effects of treatment. Therapy requires intensive monitoring for antimicrobial agent toxicity. Thanks for allowing me to participate in your patient's care please call me with any questions or concerns.     Dr. Guerrero Camacho MD  [address] [place] [address] Physician  Phone: [phone]   Fax : [phone]

## 2022-07-25 NOTE — PROGRESS NOTES
Physical Therapy  Facility/Department: [address] PROGRESSIVE CARE  Physical Therapy Treatment Note    Name: Harley Hays  : 1945  MRN: [id number]  Date of Service: 2022    Discharge Recommendations:  Patient would benefit from continued therapy after discharge (3-5x/wk)   PT Equipment Recommendations  Other: defer to next level of care    Harley Hays scored a 16/24 on the AM-PAC short mobility form. Current research shows that an AM-PAC score of 17 or less is typically not associated with a discharge to the patient's home setting. Based on the patient's AM-PAC score and their current functional mobility deficits, it is recommended that the patient have 3-5 sessions per week of Physical Therapy at d/c to increase the patient's independence. Please see assessment section for further patient specific details. If patient discharges prior to next session this note will serve as a discharge summary. Please see below for the latest assessment towards goals. Patient Diagnosis(es): The primary encounter diagnosis was NSTEMI (non-ST elevated myocardial infarction) ([address] Diagnoses of Pneumonia of right lung due to infectious organism, unspecified part of lung and Hypoxia were also pertinent to this visit. Past Medical History:  has a past medical history of Chronic pain syndrome, Colorectal polyps, COPD (chronic obstructive pulmonary disease) (HCC), CTS (carpal tunnel syndrome), DDD (degenerative disc disease), lumbar, Depression, Family hx of colon cancer, Fibromyalgia, Hyperlipemia, IBS (irritable bowel syndrome), Lumbar spondylosis, New onset type 2 diabetes mellitus ([address] and Urticaria, chronic. Past Surgical History:  has a past surgical history that includes Tympanostomy tube placement; Cervical polyp removal (2004); Carpal tunnel release (Bilateral); Tubal ligation; Intracapsular cataract extraction (Left, 2020); and Intracapsular cataract extraction (Right, 2020).     Assessment Body Structures, Functions, Activity Limitations Requiring Skilled Therapeutic Intervention: Decreased functional mobility   Assessment: The pt is a 77 yo female who presented to the ED with 2-3 day h/o of worsening SOB. She was recently treated for pna and was having rehab at [facility]. In the ED she had an abd EKG and cardiology consulted and had a LHC on 7- for a NSTEMI. Troponins elevated 0.70. The pt is from home with boyfriend and prior to having pna was indep in mobility and self-care. The pt has been receiving rehab following dx of pna and has been getting therapy at the [facility]. PMHx: chronic pain syndrome, COPD on home O2, lmb DDD, depression, fibromyalgia, DM       Today, the pt demonstrated that she is functioning well below her baseline and is limited by her need for supplemental O2 and decreasing SpO2 levels with activity; she is generally weak and has severely decreased activity tolerance and will benefit from con't skilled PT at d/c.  At this time, she would be unsafe for home as she continues to need CGA/min A for mobility tasks and needing 5 liters of O2; pt not yet amb household distances due to early fatigue and breathing status  Therapy Prognosis: Good  Decision Making: Medium Complexity  Barriers to Learning: fatigue  Requires PT Follow-Up: Yes  Activity Tolerance  Activity Tolerance Comments: spO2 decreased to 86% with activity on 5 liters O2     Plan   Plan  Plan: 3-5 times per week  Current Treatment Recommendations: Strengthening, ROM, Balance training, Functional mobility training, Transfer training, Gait training, Safety education & training, Patient/Caregiver education & training, Equipment evaluation, education, & procurement, Therapeutic activities  Safety Devices  Type of Devices: Call light within reach, Chair alarm in place, Gait belt, Left in chair, Nurse notified     Restrictions  Restrictions/Precautions  Restrictions/Precautions: Fall Risk  Position Activity Restriction  Other position/activity restrictions: 5L O2     Subjective   General  Chart Reviewed: Yes  Additional Pertinent Hx: Per Delano Sevilla MD H&P on 7-: The pt is a 77 yo female who presented to the ED with 2-3 day h/o of worsening SOB. She was recently treated for pna and was having rehab at [facility]. In the ED she had an abd EKG and cardiology consulted and had a LHC on 7- for a NSTEMI. Troponins elevated 0.70. PMHx: chronic pain syndrome, COPD on home O2, lmb DDD, depression, fibromyalgia, DM  Response To Previous Treatment: Patient with no complaints from previous session. Family / Caregiver Present: No  Referring Practitioner: Eryn Allen MD  Referral Date : 07/19/22  Diagnosis: Acute on chronic respiratory failure with hypoxia and hypercapnia, NSTEMI  Follows Commands: Within Functional Limits  Subjective  Subjective: pt coughing a lot but agreeable to working with therapy; states \"I've never felt this bad before\"         Social/Functional History  Social/Functional History  Lives With: Significant other (Ray)  Type of Home: Apartment (first floor)  Home Layout: One level (laundry in apt)  Home Access: Level entry  Bathroom Shower/Tub: Tub/Shower unit  Bathroom Toilet: Standard  Bathroom Equipment: Shower chair  Home Equipment: Cane  ADL Assistance: [place]: Independent (shared with significant other)  Ambulation Assistance: Independent (without AD)  Transfer Assistance: Independent  Active : No  Patient's  Info: significant other drives  Occupation: Retired  Additional Comments: Pt recent admit with COPD Exac, ROLY, UTI and Sepsis and d/c'd on 7/13/2022 to Home at [facility] for rehab.   Vision/Hearing  Vision  Vision: Impaired  Vision Exceptions: Wears glasses for reading  Hearing  Hearing: Within functional limits    Cognition   Orientation  Overall Orientation Status: Within Functional Limits  Cognition  Overall Cognitive Status: WFL     Objective   Heart Rate: 100  Heart Rate Source: Monitor  BP: 126/74  BP Location: Right upper arm  BP Method: Automatic  Patient Position: Semi fowlers  MAP (Calculated): 91.33  Resp: 20  SpO2: (!) 89 %  O2 Device: High flow nasal cannula                             Bed mobility  Supine to Sit: Contact guard assistance (HOB elevated)  Transfers  Sit to Stand: Contact guard assistance;Minimal Assistance  Stand to sit: Contact guard assistance  Ambulation  Surface: level tile  Device: Rolling Walker  Other Apparatus: O2 (5 liters)  Assistance: Contact guard assistance;Minimal assistance  Quality of Gait: pt at times needs assist with maneuvering walker with cues to stay in base of walker  Distance: 10' and 25'  Comments: therapist managed O2 line, contiuous pulse ox, catheter and IV lines     Balance  Comments: SBA for sitting balance; CGA for standing with RW       Pt used commode with assist for cleansing; positioned in chair for comfort with all needs in reach    OutComes Score                                                  AM-PAC Score  AM-PAC Inpatient Mobility Raw Score : 16 (07/25/22 1115)  AM-PAC Inpatient T-Scale Score : 40.78 (07/25/22 1115)  Mobility Inpatient CMS 0-100% Score: 54.16 (07/25/22 1115)  Mobility Inpatient CMS G-Code Modifier : CK (07/25/22 1115)          T[place]eti Score       Goals  Short Term Goals  Time Frame for Short term goals: upon d/c  Short term goal 1: Bed mobility with sup/mod I. Short term goal 2: Transfers sit <> stand with SBA. Short term goal 3: Ambulate with wh walker 50 feet with CGA.   Short term goal 4: SpO2 levels to stay >90% with activity  Patient Goals   Patient goals : to go home       Education  Patient Education  Education Given To: Patient  Education Provided Comments: reviewed call light and not getting up without assist; reviewed benefits of continued therapy; reviewed walker safety  Education Method: Demonstration;Verbal  Education Outcome: Verbalized understanding;Continued education needed      Therapy Time   Individual Concurrent Group Co-treatment   Time In 1027         Time Out [phone]         Minutes 48                 THANH GERMAIN PT   Electronically signed by THANH GERMAIN PT on 7/25/2022 at 11:16 AM

## 2022-07-25 NOTE — PLAN OF CARE
3/31/2021         RE: Bianka Whitmore  [address]  [facility] [address]        Dear Colleague,    Thank you for referring your patient, Bianka Whitmore, to the [facility]. Please see a copy of my visit note below.    Otolaryngology Note         Chief Complaint:     Patient presents with:  Cerumen Impaction: Ear Cleaning           History of Present Illness:     Bianka Whitmore is a 86 year old female who presents today for ear cleaning.    She last had ears cleaned about 8 months ago.   She concerns with hearing.  She has constant tinnitus, non-pulsatile  She has some mild vertigo when she bends in a certain direction for longer periods of time.  The symptoms last a few seconds happens occassionally.    No facial numbness or weakness.      No otalgia, otorrhea.    No hx of COM or otologic surgeries.     Right ear pinna lesion noted.  She reports it has been irritated off and on since she had biopsy completed in September 2013.  It it tender to touch and has recurrent scabbing and irritation.  She reports she sleeps on her right side.  She is resistant to having another biopsy, but is willing to follow up with Dr Onofre for evaluation.  I advised her a repeat biopsy may be indicated.  She has a history of basal cell carcinoma on her left arm.   History of frequent sun exposure as a child, [place].  No known family history of carcinoma.      Previous biopsy right ear lesion completed 9/17/13    FINAL DIAGNOSIS:   A. Skin, right ear:   1.  Possible benign hemangioma.   2.  Actinic keratosis.          Medications:     Current Outpatient Rx   Medication Sig Dispense Refill     Ascorbic Acid (VITAMIN C PO) Take 500 mg by mouth 2 times daily       B Complex-Biotin-FA (VITAMIN B50 COMPLEX PO) Take 1 tablet by mouth daily       Flaxseed, Linseed, (FLAX SEED OIL) 1000 MG capsule Take 1 capsule by mouth daily       MAGNESIUM OXIDE PO Take by mouth daily        NONFORMULARY 1  Pain/discomfort being managed with PRN analgesics per MD orders. Pt able to express presence and absence of pain and rate pain appropriately using numerical scale. Skin assessment completed every shift. Pt assessed for incontinence, appropriate barrier cream applied prn. Pt encouraged to turn/rotate every 2 hours. Assistance provided if pt unable to do so themselves. Intake/Output Summary (Last 24 hours) at 7/25/2022 1212  Last data filed at 7/25/2022 0944  Gross per 24 hour   Intake 720 ml   Output 1300 ml   Net -580 ml     Vitals:    07/25/22 1145   BP: 130/70   Pulse:    Resp:    Temp: 97.6 °F (36.4 °C)   SpO2:      Patient assessed for fall risk; fall precautions initiated. Patient and family instructed about safety devices. Environment kept free of clutter and adequate lighting provided. Bed locked and in lowest position. Call light within reach. Will continue to monitor. Skin assessment completed every shift. Pt assessed for incontinence, appropriate barrier cream applied prn. Pt encouraged to turn/rotate every 2 hours. Assistance provided if pt unable to do so themselves. "tablespoon of chopped fresh garlic       NONFORMULARY Extra virgin olive oil 2 tablespoons  Daily       Omega-3 Fatty Acids (FISH OIL) 1200 MG CAPS Take 1,200 mg by mouth 2 times daily        VITAMIN E COMPLEX PO Take 400 Units by mouth daily              Allergies:     Allergies: Amoxicillin, Codeine, and Hydrocodone          Past Medical History:     Past Medical History:   Diagnosis Date     Basal cell carcinoma      Cardiomegaly 2007     Compression fracture of thoracic vertebra (H) 10/30/2014     Compression fx, lumbar spine, with routine healing, subsequent encounter 2016     Hyperlipidemia      Osteoporosis      Typical atrial flutter (H) 2016     Varicose vein of leg 2016            Past Surgical History:     Past Surgical History:   Procedure Laterality Date     APPENDECTOMY       COLONOSCOPY N/A 2017    Procedure: COLONOSCOPY;  COLONOSCOPY WITH POLYPECTOMY AND SCLEROTHERAPY;  Surgeon: Matt Gonzalez MD;  Location: [facility] OR     ENDOSCOPIC STRIPPING VEIN(S)       HYSTERECTOMY       SALPINGO OOPHORECTOMY,R/L/BRIAN         ENT family history reviewed         Social History:     Social History     Tobacco Use     Smoking status: Former Smoker     Packs/day: 0.50     Years: 3.00     Pack years: 1.50     Types: Cigarettes     Start date: 1950     Quit date: 3/26/1953     Years since quittin.0     Smokeless tobacco: Never Used     Tobacco comment: Quit in ; no passive smoke exposure   Substance Use Topics     Alcohol use: No     Drug use: No            Review of Systems:     ROS: See HPI         Physical Exam:     /76 (Cuff Size: Adult Regular)   Pulse 80   Temp 97.4  F (36.3  C) (Tympanic)   Ht 1.626 m (5' 4\")   Wt 54.4 kg (120 lb)   SpO2 94%   BMI 20.60 kg/m      General - The patient is well nourished and well developed, and appears to have good nutritional status.  Alert and oriented to person and place, answers questions and cooperates with examination " appropriately.   Head and Face - Normocephalic and atraumatic, with no gross asymmetry noted.  The facial nerve is intact, with strong symmetric movements.  Voice and Breathing - The patient was breathing comfortably without the use of accessory muscles. There was no wheezing, stridor. The patients voice was clear and strong, and had appropriate pitch and quality.  Ears - The ears were examined with binocular microscopy and with otoscope.  Right pinna with minimally raised, mildly erythematous, lesion that is tender to palpation.  It has a central area with healing scab.  NO active drainage.  It is normothermic.  Left external ear normal. Right canal has minimal ceruminosis  Left canal cerumen impacted.  The right ear was cleaned with cupped forceps.   Right tympanic membrane is intact without effusion, retraction or mass. The left ear was cleaned with #7 sucker, cupped forceps.  Left tympanic membrane is intact without effusion, retraction or mass.  Eyes - Extraocular movements intact, sclera were not icteric or injected, conjunctiva were pink and moist.  Mouth - Examination of the oral cavity showed pink, healthy oral mucosa. Dentition in good condition. No lesions or ulcerations noted. The tongue was mobile and midline.   Throat - The walls of the oropharynx were smooth, pink, moist, symmetric, and had no lesions or ulcerations.  The tonsillar pillars and soft palate were symmetric. The uvula was midline on elevation.    Neck - Full range of motion on passive movement.  Palpation of the occipital, submental, submandibular, internal jugular chain, and supraclavicular nodes did not demonstrate any abnormal lymph nodes or masses.  Palpation of the thyroid was soft and smooth, with no nodules or goiter appreciated.  The trachea was mobile and midline.  Nose - External contour is symmetric, no gross deflection or scars.  Nasal mucosa is pink and moist with no abnormal mucus.  The septum and turbinates were evaluated  with nasal speculum, no polyps, masses, or purulence noted on examination.         Assessment and Plan:       ICD-10-CM    1. Impacted cerumen of left ear  H61.22    2. Ceruminosis, right  H61.21    3. Lesion of right external ear  H61.91        Try to avoid pressure on the right ear, do your best not to sleep on it  Wash the area 2 times per day and apply bacitracin twice daily for 1 week, then use aquaphor twice daily thereafter  Follow up with Dr Onofre for re-evaluation of the right ear.      Ears were cleaned, tolerated well  Follow up in 6-8 months for repeat ear cleaning    Avoid flushing the ear canal if there is a possibility of a hole or perforation in the tympanic membrane.   If there are any unresolved concerns regarding hearing loss an audiogram is recommended.      Ashley KEITA  [facility]          Again, thank you for allowing me to participate in the care of your patient.        Sincerely,        Ashley Stephen NP

## 2022-07-25 NOTE — PROGRESS NOTES
Arrived to place PICC for pt. Bedside LIZETH Mendoza completed timeout in the room verifying the pt, procedure and consent. Double Lumen PICC was placed in the Left Brachial Vein without complications. 3CG verified tip location. Brisk blood return achieved and flushed without resistance. Handoff completed with LIZETH Mendoza. Thank you for allowing our care and services.

## 2022-07-25 NOTE — PLAN OF CARE
Problem: Discharge Planning  Goal: Discharge to home or other facility with appropriate resources  Outcome: Progressing     Problem: Pain  Goal: Verbalizes/displays adequate comfort level or baseline comfort level  Outcome: Progressing     Problem: Confusion  Goal: Confusion, delirium, dementia, or psychosis is improved or at baseline  Description: INTERVENTIONS:  1. Assess for possible contributors to thought disturbance, including medications, impaired vision or hearing, underlying metabolic abnormalities, dehydration, psychiatric diagnoses, and notify attending LIP  2. [place] high risk fall precautions, as indicated  3. Provide frequent short contacts to provide reality reorientation, refocusing and direction  4. Decrease environmental stimuli, including noise as appropriate  5. Monitor and intervene to maintain adequate nutrition, hydration, elimination, sleep and activity  6. If unable to ensure safety without constant attention obtain sitter and review sitter guidelines with assigned personnel  7. Initiate Psychosocial CNS and Spiritual Care consult, as indicated  Outcome: Progressing     Problem: Skin/Tissue Integrity  Goal: Absence of new skin breakdown  Description: 1. Monitor for areas of redness and/or skin breakdown  2. Assess vascular access sites hourly  3. Every 4-6 hours minimum:  Change oxygen saturation probe site  4. Every 4-6 hours:  If on nasal continuous positive airway pressure, respiratory therapy assess nares and determine need for appliance change or resting period.   Outcome: Progressing     Problem: Safety - Adult  Goal: Free from fall injury  Outcome: Progressing     Problem: ABCDS Injury Assessment  Goal: Absence of physical injury  Outcome: Progressing     Problem: Chronic Conditions and Co-morbidities  Goal: Patient's chronic conditions and co-morbidity symptoms are monitored and maintained or improved  Outcome: Progressing     Problem: Nutrition Deficit:  Goal: Optimize nutritional status  Outcome: Progressing

## 2022-07-26 PROBLEM — B96.5 PSEUDOMONAS RESPIRATORY INFECTION: Status: ACTIVE | Noted: 2022-07-26

## 2022-07-26 PROBLEM — J85.0 NECROTIZING PNEUMONIA (HCC): Status: ACTIVE | Noted: 2022-07-26

## 2022-07-26 PROBLEM — J85.1 ABSCESS OF UPPER LOBE OF RIGHT LUNG WITH PNEUMONIA (HCC): Status: ACTIVE | Noted: 2022-07-26

## 2022-07-26 PROBLEM — I21.4 NSTEMI (NON-ST ELEVATED MYOCARDIAL INFARCTION) (HCC): Status: ACTIVE | Noted: 2022-07-26

## 2022-07-26 PROBLEM — R93.89 ABNORMAL CT OF THE CHEST: Status: ACTIVE | Noted: 2022-07-26

## 2022-07-26 PROBLEM — J98.8 PSEUDOMONAS RESPIRATORY INFECTION: Status: ACTIVE | Noted: 2022-07-26

## 2022-07-26 LAB
ANION GAP SERPL CALCULATED.3IONS-SCNC: 8 MMOL/L (ref 3–16)
BASOPHILS ABSOLUTE: 0 K/UL (ref 0–0.2)
BASOPHILS RELATIVE PERCENT: 0.4 %
BUN BLDV-MCNC: 6 MG/DL (ref 7–20)
CALCIUM SERPL-MCNC: 7.9 MG/DL (ref 8.3–10.6)
CHLORIDE BLD-SCNC: 103 MMOL/L (ref 99–110)
CO2: 26 MMOL/L (ref 21–32)
CREAT SERPL-MCNC: <0.5 MG/DL (ref 0.6–1.2)
EOSINOPHILS ABSOLUTE: 0.3 K/UL (ref 0–0.6)
EOSINOPHILS RELATIVE PERCENT: 4.9 %
GFR AFRICAN AMERICAN: >60
GFR NON-AFRICAN AMERICAN: >60
GLUCOSE BLD-MCNC: 107 MG/DL (ref 70–99)
GLUCOSE BLD-MCNC: 107 MG/DL (ref 70–99)
GLUCOSE BLD-MCNC: 118 MG/DL (ref 70–99)
GLUCOSE BLD-MCNC: 163 MG/DL (ref 70–99)
GLUCOSE BLD-MCNC: 93 MG/DL (ref 70–99)
HCT VFR BLD CALC: 24.2 % (ref 36–48)
HEMOGLOBIN: 8.3 G/DL (ref 12–16)
LYMPHOCYTES ABSOLUTE: 0.7 K/UL (ref 1–5.1)
LYMPHOCYTES RELATIVE PERCENT: 10.7 %
MAGNESIUM: 1.4 MG/DL (ref 1.8–2.4)
MCH RBC QN AUTO: 31.6 PG (ref 26–34)
MCHC RBC AUTO-ENTMCNC: 34.5 G/DL (ref 31–36)
MCV RBC AUTO: 91.6 FL (ref 80–100)
MONOCYTES ABSOLUTE: 0.7 K/UL (ref 0–1.3)
MONOCYTES RELATIVE PERCENT: 11.4 %
NEUTROPHILS ABSOLUTE: 4.5 K/UL (ref 1.7–7.7)
NEUTROPHILS RELATIVE PERCENT: 72.6 %
PDW BLD-RTO: 14 % (ref 12.4–15.4)
PERFORMED ON: ABNORMAL
PERFORMED ON: NORMAL
PLATELET # BLD: 429 K/UL (ref 135–450)
PMV BLD AUTO: 6.9 FL (ref 5–10.5)
POTASSIUM REFLEX MAGNESIUM: 3.5 MMOL/L (ref 3.5–5.1)
RBC # BLD: 2.64 M/UL (ref 4–5.2)
SODIUM BLD-SCNC: 137 MMOL/L (ref 136–145)
WBC # BLD: 6.3 K/UL (ref 4–11)

## 2022-07-26 PROCEDURE — 97530 THERAPEUTIC ACTIVITIES: CPT

## 2022-07-26 PROCEDURE — 80048 BASIC METABOLIC PNL TOTAL CA: CPT

## 2022-07-26 PROCEDURE — 85025 COMPLETE CBC W/AUTO DIFF WBC: CPT

## 2022-07-26 PROCEDURE — 6370000000 HC RX 637 (ALT 250 FOR IP): Performed by: INTERNAL MEDICINE

## 2022-07-26 PROCEDURE — 6360000002 HC RX W HCPCS: Performed by: INTERNAL MEDICINE

## 2022-07-26 PROCEDURE — 94761 N-INVAS EAR/PLS OXIMETRY MLT: CPT

## 2022-07-26 PROCEDURE — 6370000000 HC RX 637 (ALT 250 FOR IP): Performed by: NURSE PRACTITIONER

## 2022-07-26 PROCEDURE — 99233 SBSQ HOSP IP/OBS HIGH 50: CPT | Performed by: INTERNAL MEDICINE

## 2022-07-26 PROCEDURE — 83735 ASSAY OF MAGNESIUM: CPT

## 2022-07-26 PROCEDURE — 2580000003 HC RX 258: Performed by: INTERNAL MEDICINE

## 2022-07-26 PROCEDURE — 1200000000 HC SEMI PRIVATE

## 2022-07-26 PROCEDURE — 36415 COLL VENOUS BLD VENIPUNCTURE: CPT

## 2022-07-26 PROCEDURE — 2700000000 HC OXYGEN THERAPY PER DAY

## 2022-07-26 PROCEDURE — 99232 SBSQ HOSP IP/OBS MODERATE 35: CPT | Performed by: INTERNAL MEDICINE

## 2022-07-26 PROCEDURE — 94640 AIRWAY INHALATION TREATMENT: CPT

## 2022-07-26 RX ORDER — MAGNESIUM SULFATE IN WATER 40 MG/ML
2000 INJECTION, SOLUTION INTRAVENOUS ONCE
Status: COMPLETED | OUTPATIENT
Start: 2022-07-26 | End: 2022-07-26

## 2022-07-26 RX ADMIN — ATORVASTATIN CALCIUM 40 MG: 40 TABLET, FILM COATED ORAL at 20:40

## 2022-07-26 RX ADMIN — PRASUGREL 10 MG: 10 TABLET, FILM COATED ORAL at 08:08

## 2022-07-26 RX ADMIN — MEROPENEM 1000 MG: 1 INJECTION, POWDER, FOR SOLUTION INTRAVENOUS at 21:47

## 2022-07-26 RX ADMIN — TIOTROPIUM BROMIDE INHALATION SPRAY 2 PUFF: 3.12 SPRAY, METERED RESPIRATORY (INHALATION) at 08:10

## 2022-07-26 RX ADMIN — HYDROCODONE BITARTRATE AND ACETAMINOPHEN 1 TABLET: 5; 325 TABLET ORAL at 08:14

## 2022-07-26 RX ADMIN — DULOXETINE HYDROCHLORIDE 60 MG: 60 CAPSULE, DELAYED RELEASE ORAL at 20:40

## 2022-07-26 RX ADMIN — NYSTATIN 500000 UNITS: 100000 SUSPENSION ORAL at 17:36

## 2022-07-26 RX ADMIN — PANTOPRAZOLE SODIUM 40 MG: 40 TABLET, DELAYED RELEASE ORAL at 08:07

## 2022-07-26 RX ADMIN — TIZANIDINE 4 MG: 4 TABLET ORAL at 20:40

## 2022-07-26 RX ADMIN — TOBRAMYCIN 300 MG: 300 SOLUTION RESPIRATORY (INHALATION) at 20:54

## 2022-07-26 RX ADMIN — TOBRAMYCIN 300 MG: 300 SOLUTION RESPIRATORY (INHALATION) at 08:15

## 2022-07-26 RX ADMIN — INSULIN LISPRO 1 UNITS: 100 INJECTION, SOLUTION INTRAVENOUS; SUBCUTANEOUS at 20:43

## 2022-07-26 RX ADMIN — GABAPENTIN 600 MG: 300 CAPSULE ORAL at 08:07

## 2022-07-26 RX ADMIN — DULOXETINE HYDROCHLORIDE 60 MG: 60 CAPSULE, DELAYED RELEASE ORAL at 08:07

## 2022-07-26 RX ADMIN — MOMETASONE FUROATE AND FORMOTEROL FUMARATE DIHYDRATE 2 PUFF: 200; 5 AEROSOL RESPIRATORY (INHALATION) at 20:55

## 2022-07-26 RX ADMIN — MEROPENEM 1000 MG: 1 INJECTION, POWDER, FOR SOLUTION INTRAVENOUS at 05:35

## 2022-07-26 RX ADMIN — MOMETASONE FUROATE AND FORMOTEROL FUMARATE DIHYDRATE 2 PUFF: 200; 5 AEROSOL RESPIRATORY (INHALATION) at 08:12

## 2022-07-26 RX ADMIN — OXYCODONE 5 MG: 5 TABLET ORAL at 12:13

## 2022-07-26 RX ADMIN — NYSTATIN 500000 UNITS: 100000 SUSPENSION ORAL at 08:07

## 2022-07-26 RX ADMIN — MAGNESIUM SULFATE HEPTAHYDRATE 2000 MG: 40 INJECTION, SOLUTION INTRAVENOUS at 09:38

## 2022-07-26 RX ADMIN — GABAPENTIN 600 MG: 300 CAPSULE ORAL at 20:40

## 2022-07-26 RX ADMIN — NYSTATIN 500000 UNITS: 100000 SUSPENSION ORAL at 12:13

## 2022-07-26 RX ADMIN — ASPIRIN 81 MG: 81 TABLET, COATED ORAL at 08:07

## 2022-07-26 RX ADMIN — NYSTATIN 500000 UNITS: 100000 SUSPENSION ORAL at 20:40

## 2022-07-26 RX ADMIN — MEROPENEM 1000 MG: 1 INJECTION, POWDER, FOR SOLUTION INTRAVENOUS at 13:36

## 2022-07-26 RX ADMIN — HYDROCODONE BITARTRATE AND ACETAMINOPHEN 1 TABLET: 5; 325 TABLET ORAL at 17:39

## 2022-07-26 RX ADMIN — SODIUM CHLORIDE, PRESERVATIVE FREE 10 ML: 5 INJECTION INTRAVENOUS at 08:08

## 2022-07-26 RX ADMIN — OXYCODONE 5 MG: 5 TABLET ORAL at 05:31

## 2022-07-26 ASSESSMENT — PAIN SCALES - GENERAL
PAINLEVEL_OUTOF10: 7
PAINLEVEL_OUTOF10: 0
PAINLEVEL_OUTOF10: 6
PAINLEVEL_OUTOF10: 9
PAINLEVEL_OUTOF10: 7
PAINLEVEL_OUTOF10: 8
PAINLEVEL_OUTOF10: 8
PAINLEVEL_OUTOF10: 6

## 2022-07-26 ASSESSMENT — PAIN DESCRIPTION - ORIENTATION: ORIENTATION: LOWER

## 2022-07-26 ASSESSMENT — PAIN DESCRIPTION - ONSET: ONSET: ON-GOING

## 2022-07-26 ASSESSMENT — PAIN - FUNCTIONAL ASSESSMENT
PAIN_FUNCTIONAL_ASSESSMENT: ACTIVITIES ARE NOT PREVENTED

## 2022-07-26 ASSESSMENT — PAIN DESCRIPTION - PAIN TYPE: TYPE: CHRONIC PAIN

## 2022-07-26 ASSESSMENT — PAIN DESCRIPTION - DESCRIPTORS
DESCRIPTORS: ACHING

## 2022-07-26 ASSESSMENT — PAIN DESCRIPTION - LOCATION
LOCATION: BACK

## 2022-07-26 ASSESSMENT — PAIN DESCRIPTION - FREQUENCY: FREQUENCY: CONTINUOUS

## 2022-07-26 NOTE — PLAN OF CARE
Problem: Discharge Planning  Goal: Discharge to home or other facility with appropriate resources  Outcome: Progressing Towards Goal  Case mgmt involved to assess appropriate level of care and resources. Problem: Pain  Goal: Verbalizes/displays adequate comfort level or baseline comfort level  Outcome: Progressing Towards Goal  Pain being managed with PRN analgesics per MD orders. Patient able to express presence and absence of pain and rate pain appropriately using numerical scale. Problem: Skin/Tissue Integrity  Goal: Absence of new skin breakdown  Description: 1. Monitor for areas of redness and/or skin breakdown  2. Assess vascular access sites hourly  3. Every 4-6 hours minimum:  Change oxygen saturation probe site  4. Every 4-6 hours:  If on nasal continuous positive airway pressure, respiratory therapy assess nares and determine need for appliance change or resting period. Outcome: Progressing Towards Goal  Skin assessment completed every shift. Patient assessed for incontinence, appropriate barrier cream applied prn. Patient encouraged to turn/rotate every 2 hours. Assistance provided if patient unable to do so themselves. Problem: Safety - Adult  Goal: Free from fall injury  Outcome: Progressing Towards Goal  Fall risk assessment completed every shift. All precautions in place. Patient has call light with in reach at all times. Room free from clutter. Patient aware to call for assistance when getting up. Problem: ABCDS Injury Assessment  Goal: Absence of physical injury  Outcome: Progressing Towards Goal  Patient free of physical injury     Problem: Chronic Conditions and Co-morbidities  Goal: Patient's chronic conditions and co-morbidity symptoms are monitored and maintained or improved  Outcome: Progressing Towards Goal  Patient's VS and labs are monitored for signs of comorbidity progression.

## 2022-07-26 NOTE — PLAN OF CARE
Problem: Discharge Planning  Goal: Discharge to home or other facility with appropriate resources  7/26/2022 0803 by Adrian Lewis RN  Outcome: Progressing Towards Goal  Flowsheets (Taken 7/26/2022 0800)  Discharge to home or other facility with appropriate resources: Identify barriers to discharge with patient and caregiver     Problem: Pain  Goal: Verbalizes/displays adequate comfort level or baseline comfort level  7/26/2022 0803 by Adrian Lewis RN  Outcome: Progressing Towards Goal     Problem: Confusion  Goal: Confusion, delirium, dementia, or psychosis is improved or at baseline  Description: INTERVENTIONS:  1. Assess for possible contributors to thought disturbance, including medications, impaired vision or hearing, underlying metabolic abnormalities, dehydration, psychiatric diagnoses, and notify attending LIP  2. [place] high risk fall precautions, as indicated  3. Provide frequent short contacts to provide reality reorientation, refocusing and direction  4. Decrease environmental stimuli, including noise as appropriate  5. Monitor and intervene to maintain adequate nutrition, hydration, elimination, sleep and activity  6. If unable to ensure safety without constant attention obtain sitter and review sitter guidelines with assigned personnel  7. Initiate Psychosocial CNS and Spiritual Care consult, as indicated  7/26/2022 0803 by Adrian Lewis RN  Outcome: Progressing Towards Goal     Problem: Skin/Tissue Integrity  Goal: Absence of new skin breakdown  Description: 1. Monitor for areas of redness and/or skin breakdown  2. Assess vascular access sites hourly  3. Every 4-6 hours minimum:  Change oxygen saturation probe site  4. Every 4-6 hours:  If on nasal continuous positive airway pressure, respiratory therapy assess nares and determine need for appliance change or resting period.   7/26/2022 0803 by Adrian Lewis RN  Outcome: Progressing Towards Goal     Problem: Safety - Adult  Goal: Free from fall injury  7/26/2022 0803 by Metro Section, RN  Outcome: Progressing Towards Goal     Problem: ABCDS Injury Assessment  Goal: Absence of physical injury  7/26/2022 0803 by Metro Section, RN  Outcome: Progressing Towards Goal     Problem: Chronic Conditions and Co-morbidities  Goal: Patient's chronic conditions and co-morbidity symptoms are monitored and maintained or improved  7/26/2022 0803 by Metro Section, RN  Outcome: Progressing Towards Goal  Flowsheets (Taken 7/26/2022 0800)  Care Plan - Patient's Chronic Conditions and Co-Morbidity Symptoms are Monitored and Maintained or Improved: Monitor and assess patient's chronic conditions and comorbid symptoms for stability, deterioration, or improvement

## 2022-07-26 NOTE — PROGRESS NOTES
Occupational Therapy  Facility/Department: [address] PROGRESSIVE CARE  Occupational Therapy Daily Treatment Note    Name: Clay Wilkerson  : 1945  MRN: [id number]  Date of Service: 2022    Discharge Recommendations:  3-5 sessions per week, Patient would benefit from continued therapy after discharge  OT Equipment Recommendations  Other: defer to Amanda Noyola scored a 16/24 on the AM-PAC ADL Inpatient form. Current research shows that an AM-PAC score of 17 or less is typically not associated with a discharge to the patient's home setting. Based on the patient's AM-PAC score and their current ADL deficits, it is recommended that the patient have 3-5 sessions per week of Occupational Therapy at d/c to increase the patient's independence. Please see assessment section for further patient specific details. If patient discharges prior to next session this note will serve as a discharge summary. Please see below for the latest assessment towards goals. Patient Diagnosis(es): The primary encounter diagnosis was NSTEMI (non-ST elevated myocardial infarction) ([facility] [address] Diagnoses of Pneumonia of right lung due to infectious organism, unspecified part of lung and Hypoxia were also pertinent to this visit. Past Medical History:  has a past medical history of Chronic pain syndrome, Colorectal polyps, COPD (chronic obstructive pulmonary disease) (HCC), CTS (carpal tunnel syndrome), DDD (degenerative disc disease), lumbar, Depression, Family hx of colon cancer, Fibromyalgia, Hyperlipemia, IBS (irritable bowel syndrome), Lumbar spondylosis, New onset type 2 diabetes mellitus ([address] and Urticaria, chronic. Past Surgical History:  has a past surgical history that includes Tympanostomy tube placement; Cervical polyp removal (2004); Carpal tunnel release (Bilateral); Tubal ligation;  Intracapsular cataract extraction (Left, 2020); and Intracapsular cataract extraction (Right, 7/14/2020). Assessment   Performance deficits / Impairments: Decreased functional mobility ; Decreased ADL status; Decreased ROM; Decreased strength;Decreased endurance;Decreased balance;Decreased high-level IADLs  Assessment: Pt is a 78 y.o. female admitted from SNF with Acute on chronic respiratory failure with hypoxia and hypercapnia, NSTEMI. Pt recent admit with COPD Exac, ROLY, UTI and Sepsis and d/c'd on 7/13/2022 to Home at [facility] for rehab. At baseline, pt lives with significant other in apartment setting and independent ADLs, IADLs, and fxl mobility. Pt currently functioning below baseline d/t the above deficits, today requiring CGA fxl transfers, CGA/min A fxl mobility with RW,  and anticipate pt would require overall min/mod A for ADLs. Pt easily fatigued with minimal activity and requires rest breaks d/t desating to low 80s on 5L. Pt with multiple coughing spells throughout session. Pt demonstrates need for ongoing skilled OT at d/c to maximize pt's safety and independence prior to return home. Prognosis: Good  REQUIRES OT FOLLOW-UP: Yes  Activity Tolerance  Activity Tolerance: Patient limited by fatigue  Activity Tolerance Comments: pt on 5L oxygen, desats to low 80s with activity and requries inc time to recover to 90%.  Pt with multiple coughing fits throughout session and coughs up large amount of phlegm        Plan   Plan  Times per Week: 3-5  Current Treatment Recommendations: Strengthening, ROM, Balance training, Functional mobility training, Endurance training, Safety education & training, Self-Care / ADL     Restrictions  Restrictions/Precautions  Restrictions/Precautions: Fall Risk  Position Activity Restriction  Other position/activity restrictions: 5L O2    Subjective   General  Chart Reviewed: Yes  Patient assessed for rehabilitation services?: Yes  Additional Pertinent Hx: Per Dr. Orlando Vergara H&P: \"Pt is an 78y.o. year-old female with a history of hyperlipidemia, fibromyalgia, chronic pain syndrome, severe COPD with chronic hypoxic respiratory failure requiring oxygen at 4 L/min via nasal cannula continuously. She presents to the emergency room for evaluation following a 2 to 3-day history of worsening shortness of breath. She was recently treated for Pseudomonas pneumonia and discharged to rehab. EMS reports that she was on a very long oxygen tube when they arrived. She was placed on 6 L nasal cannula and her oxygen saturation improved. In the emergency room she was found to have an abnormal EKG with inverted T waves in V2 through V5. Cardiology was consulted and asked that she be put on a heparin drip. Patient denies any current or recent chest pain. She is being admitted for further evaluation and treatment. \" Pt s/p [facility] on 7/19. Family / Caregiver Present: Yes  Referring Practitioner: Tobin Vergara MD  Diagnosis: Acute on chronic respiratory failure with hypoxia and hypercapnia, NSTEMI  Subjective  Subjective: Pt seen bedside and agreeable to therapy. Pt frequently coughing throughout session  General Comment  Comments: Per RN ok for therapy     Social/Functional History  Social/Functional History  Lives With: Significant other (Ray)  Type of Home: Apartment (first floor)  Home Layout: One level (laundry in apt)  Home Access: Level entry  Bathroom Shower/Tub: Tub/Shower unit  Bathroom Toilet: Standard  Bathroom Equipment: Shower chair  Home Equipment: Cane  ADL Assistance: [facility] [facility] [address]: Independent (shared with significant other)  Ambulation Assistance: Independent (without AD)  Transfer Assistance: Independent  Active : No  Patient's  Info: significant other drives  Occupation: Retired  Additional Comments: Pt recent admit with COPD Exac, ROLY, UTI and Sepsis and d/c'd on 7/13/2022 to Home at [facility] for rehab. Objective        Safety Devices  Type of Devices: Call light within reach;Nurse notified; Left in bed;Bed alarm in place;Gait belt          Bed mobility  Supine to Sit:  (pt in chair at start of session)  Sit to Supine: Stand by assistance (HOB slightly elevated, via log roll)    Transfers  Sit to stand: Contact guard assistance  Stand to sit: Contact guard assistance  Transfer Comments: ambulated ~ 15 feet with CGA/min A and RW. Increased time d/t decreased endurance     Cognition  Overall Cognitive Status: WFL  Orientation  Overall Orientation Status: Within Functional Limits                  Education Given To: Family; Patient  Education Provided: Role of Therapy;Plan of Care;Transfer Training;ADL Adaptive Strategies; Energy Conservation  Barriers to Learning: None  Education Outcome: Verbalized understanding;Demonstrated understanding          AM-PAC Score     AM-[facility] Inpatient Daily Activity Raw Score: 16 (07/26/22 1400)  AM-PAC Inpatient ADL T-Scale Score : 35.96 (07/26/22 1400)  ADL Inpatient CMS 0-100% Score: 53.32 (07/26/22 1400)  ADL Inpatient CMS G-Code Modifier : CK (07/26/22 1400)      Goals  Short Term Goals  Time Frame for Short term goals: Prior to d/c: goals ongoing  Short Term Goal 1: Pt will bathe with SBA. Short Term Goal 2: Pt will dress with SBA. Short Term Goal 3: Pt will toilet with SBA/supervision. Short Term Goal 4: Pt will complete fxl mobility and fxl transfers to/from ADL surfaces with SBA/supervision using AD. Short Term Goal 5: Pt will tolerate standing >5 minutes for functional task with SBA/supervision while maintaining O2 sats >90% to improve standing tolerance for ADL routine. Long Term Goals  Time Frame for Long term goals : STGs=LTGs  Patient Goals   Patient goals : to finish rehab at eventually return home. Therapy Time   Individual Concurrent Group Co-treatment   Time In 1325         Time Out 1355         Minutes 30         Timed Code Treatment Minutes: 30 Minutes     This note to serve as OT d/c summary if pt is d/c-ed prior to next therapy session.     Eduard Haywood, OTR/L

## 2022-07-26 NOTE — PROGRESS NOTES
Infectious Disease Follow up Notes  Admit Date: 7/18/2022  Hospital Day: 9    Antibiotics :   IV Meropenem  Inhaled Tobramycin     CHIEF COMPLAINT:      Rt UL lung abscess  Severe Necrotizing PNA  Pseudomonas PNA  Hypoxic resp failure      Subjective interval History :  78 y. o.woman with a past medical history of COPD, depression, chronic hypoxic respiratory failure now on   4 L of oxygen via nasal cannula, diabetes recent admission for COPD exacerbation and pneumonia diagnosed to have Pseudomonas based on sputum studies. She was on IV cefepime transition to oral levofloxacin on discharge. She was sent to [address] rehab facility and now admitted because of worsening shortness of breath increased sputum production. Sputum culture again on this admission positive for Pseudomonas with some resistance pattern, it is now become resistant to quinolones. CT chest on this admission with progressive pneumonia right upper lobe cavitary lesion possible lung abscess. Extensive consolidation  see images -  This is a new finding on the CT chest compared to her previous CT chest on 6/20/22. Given the necrotizing pneumonia with Pseudomonas infection and lung abscess formation we are consulted for recommendations. Interval History : Remains on nasal cannula has ongoing cough and sputum production. Appears weak and tired. Status post PICC line for IV antibiotic. Outpatient antibiotic therapy discussed in detail.       Past Medical History:    Past Medical History:   Diagnosis Date    Chronic pain syndrome     Colorectal polyps     COPD (chronic obstructive pulmonary disease) (HCC)     CTS (carpal tunnel syndrome)     BILATERAL    DDD (degenerative disc disease), lumbar     Depression     Family hx of colon cancer     Fibromyalgia     Hyperlipemia     IBS (irritable bowel syndrome)     Lumbar spondylosis     New onset type 2 diabetes mellitus ([address] 2/3/2020    Urticaria, chronic        Past Surgical History:    Past Surgical History:   Procedure Laterality Date    CARPAL TUNNEL RELEASE Bilateral     CERVICAL POLYP REMOVAL  9/2004    INTRACAPSULAR CATARACT EXTRACTION Left 6/23/2020    Phacoemulsification with intraocular lens implant performed by Armen Denver, MD at [address] Right 7/14/2020    Phacoemulsification with intraocular lens implant performed by Armen Denver, MD at [address]      patient denies        Current Medications:    No outpatient medications have been marked as taking for the 7/18/22 encounter [facility] Encounter). Allergies:  Patient has no known allergies.     Immunizations :   Immunization History   Administered Date(s) Administered    COVID-19, MODERNA BLUE border, Primary or Immunocompromised, (age 12y+), IM, 100 mcg/0.5mL 03/08/2021, 04/05/2021    COVID-19, MODERNA Booster BLUE border, (age 18y+), IM, 50mcg/0.25mL 12/01/2021    Influenza Vaccine, unspecified formulation 01/10/2017    Influenza, High Dose (Fluzone 65 yrs and older) 11/04/2013, 01/21/2016, 09/01/2017, 10/30/2018, 09/18/2020    Influenza, Quadv, adjuvanted, 65 yrs +, IM, PF (Fluad) 09/27/2021    Influenza, Triv, inactivated, subunit, adjuvanted, IM (Fluad 65 yrs and older) 09/13/2019    Pneumococcal Conjugate 13-valent ([id number]) 01/19/2015    Pneumococcal Conjugate Vaccine 01/10/2017    Pneumococcal Polysaccharide ([id number]) 10/12/2012    Tdap (Boostrix, Adacel) 07/17/2007    Zoster Recombinant (Shingrix) 11/21/2019       Social History:     Social History     Tobacco Use    Smoking status: Every Day     Packs/day: 1.00     Years: 55.00     Pack years: 55.00     Types: Cigarettes     Start date: 1/1/1962    Smokeless tobacco: Never    Tobacco comments:     almost ready to quit   Vaping Use    Vaping Use: Never used   Substance Use Topics Alcohol use: No     Alcohol/week: 0.0 standard drinks    Drug use: No     Social History     Tobacco Use   Smoking Status Every Day    Packs/day: 1.00    Years: 55.00    Pack years: 55.00    Types: Cigarettes    Start date: 1/1/1962   Smokeless Tobacco Never   Tobacco Comments    almost ready to quit      Family History   Problem Relation Age of Onset    Cancer Father         COLON     Alzheimer's Disease Mother     Heart Disease Brother     Heart Failure Brother     Diabetes Daughter     Diabetes Daughter     Diabetes Daughter           REVIEW OF SYSTEMS:      Constitutional:  negative for fevers, chills, night sweats  Eyes:  negative for blurred vision, eye discharge, visual disturbance   HEENT:  negative for hearing loss, ear drainage,nasal congestion  Respiratory:  r cough++ , shortness of breath + sputum + or hemoptysis  +   Cardiovascular:  negative for chest pain, palpitations, syncope  Gastrointestinal:  negative for nausea, vomiting, diarrhea, constipation, abdominal pain  Genitourinary:  negative for frequency, dysuria, urinary incontinence, hematuria  Hematologic/Lymphatic:  negative for easy bruising, bleeding and lymphadenopathy  Allergic/Immunologic:  negative for recurrent infections, angioedema, anaphylaxis   Endocrine:  negative for weight changes, polyuria, polydipsia and polyphagia  Musculoskeletal:  negative for joint  pain, swelling, decreased range of motion  Integumentary: No rashes, skin lesions  Neurological:  negative for headaches, slurred speech, unilateral weakness  Psychiatric: negative for hallucinations,confusion,agitation.                 PHYSICAL EXAM:      Vitals:    BP (!) 102/90   Pulse 93   Temp 98 °F (36.7 °C) (Oral)   Resp 18   Ht 5' 4\" (1.626 m)   Wt 141 lb 1.5 oz (64 kg)   SpO2 93%   BMI 24.22 kg/m²     General Appearance: alert,in some  acute distress, ++  pallor, no icterus  on nasal cannula+ skin changes from smoking ++   Skin: warm and dry, no rash or erythema  Head: normocephalic and atraumatic  Eyes: pupils equal, round, and reactive to light, conjunctivae normal  ENT: tympanic membrane, external ear and ear canal normal bilaterally, nose without deformity, nasal mucosa and turbinates normal without polyps  Neck: supple and non-tender without mass, no thyromegaly  no cervical lymphadenopathy  Pulmonary/Chest:  Rt UL coarse crepts+ BRONCHIAL BREATHING + =-   wheezes, rales or rhonchi, normal air movement, in some respiratory distress  Cardiovascular: normal rate, regular rhythm, normal S1 and S2, no murmurs, rubs, clicks, or gallops, no carotid bruits  Abdomen: soft, non-tender, non-distended, normal bowel sounds, no masses or organomegaly  Extremities: no cyanosis, clubbing or edema  Musculoskeletal: normal range of motion, no joint swelling, deformity or tenderness  Integumentary: No rashes, no abnormal skin lesions, no petechiae  Neurologic: reflexes normal and symmetric, no cranial nerve deficit  Psych:  Orientation, sensorium, mood normal            Lines: PICC     Data Review:    CBC:   Lab Results   Component Value Date    WBC 6.3 07/26/2022    HGB 8.3 (L) 07/26/2022    HCT 24.2 (L) 07/26/2022    MCV 91.6 07/26/2022     07/26/2022     RENAL:   Lab Results   Component Value Date    CREATININE <0.5 (L) 07/26/2022    BUN 6 (L) 07/26/2022     07/26/2022    K 3.5 07/26/2022     07/26/2022    CO2 26 07/26/2022     SED RATE: No results found for: SEDRATE  CK: No results found for: CKTOTAL  CRP: No results found for: CRP  Hepatic Function Panel:   Lab Results   Component Value Date/Time    ALKPHOS 88 07/18/2022 05:20 PM    ALT 65 07/18/2022 05:20 PM    AST 61 07/18/2022 05:20 PM    PROT 5.6 07/18/2022 05:20 PM    PROT 6.9 10/12/2012 11:00 AM    BILITOT 0.5 07/18/2022 05:20 PM    LABALBU 2.4 07/18/2022 05:20 PM     UA:  Lab Results   Component Value Date/Time    COLORU Yellow 07/22/2022 06:31 AM    CLARITYU Clear 07/22/2022 06:31 AM    GLUCOSEU Negative 07/22/2022 06:31 AM    BILIRUBINUR Negative 07/22/2022 06:31 AM    BILIRUBINUR neg 10/17/2019 11:50 AM    KETUA TRACE 07/22/2022 06:31 AM    SPECGRAV 1.049 07/22/2022 06:31 AM    BLOODU Negative 07/22/2022 06:31 AM    PHUR 6.5 07/22/2022 06:31 AM    PROTEINU 30 07/22/2022 06:31 AM    UROBILINOGEN 0.2 07/22/2022 06:31 AM    NITRU Negative 07/22/2022 06:31 AM    LEUKOCYTESUR Negative 07/22/2022 06:31 AM    LABMICR YES 07/22/2022 06:31 AM    URINETYPE NotGiven 07/22/2022 06:31 AM      Urine Microscopic:   Lab Results   Component Value Date/Time    LABCAST 10-20 Hyaline 01/10/2017 06:19 PM    BACTERIA None Seen 07/22/2022 06:31 AM    COMU see below 07/03/2022 03:11 PM    HYALCAST 2 07/22/2022 06:31 AM    WBCUA 2 07/22/2022 06:31 AM    RBCUA 5 07/22/2022 06:31 AM    EPIU 1 07/22/2022 06:31 AM     Urine Reflex to Culture:   Lab Results   Component Value Date/Time    URRFLXCULT Not Indicated 07/03/2022 03:11 PM         MICRO: cultures reviewed and updated by me   Blood Culture:          Culture, Respiratory [[id number]] (Abnormal)  Collected: 07/22/22 1200   Order Status: Completed Specimen: Sputum Expectorated Updated: 07/24/22 0801    CULTURE, RESPIRATORY Rare growth normal respiratory fabiana with Abnormal     Gram Stain Result No Epithelial Cells seen   3+ WBC's (Polymorphonuclear)   No organisms seen     Organism Pseudomonas aeruginosa Abnormal     CULTURE, RESPIRATORY Light growth   Narrative:     ORDER#: [id number]                          ORDERED BY: CIRA KEBEDE   SOURCE: Sputum Expectorated                COLLECTED:  07/22/22 12:00   ANTIBIOTICS AT GURWINDER.:                      RECEIVED :  07/22/22 12:22   Respiratory Culture [[id number]] (Abnormal)  Collected: 07/19/22 2030   Order Status: Completed Specimen: Sputum Expectorated Updated: 07/23/22 0748    CULTURE, RESPIRATORY Light growth normal respiratory fabiana with Abnormal     Gram Stain Result 2+ Gram positive cocci   2+ WBC's (Polymorphonuclear)   1+ Epithelial Cells     Organism Pseudomonas aeruginosa Abnormal     CULTURE, RESPIRATORY Light growth   Narrative:     ORDER#: [id number]                          ORDERED BY: FARZANA GAN   SOURCE: Sputum Expectorated                COLLECTED:  07/19/22 20:30   ANTIBIOTICS AT GURWINDER.:                      RECEIVED :  07/19/22 21:19   Culture, Blood 1 [[id number]] Collected: 07/18/22 1841   Order Status: Completed Specimen: Blood Updated: 07/22/22 2015    Blood Culture, Routine No Growth after 4 days of incubation. Narrative:     ORDER#: [id number]                          ORDERED BY: Nasim Quintanilla   SOURCE: Blood                              COLLECTED:  07/18/22 18:41   ANTIBIOTICS AT GURWINDER.:                      RECEIVED :  07/18/22 18:56   If child <=2 yrs old please draw pediatric bottle. ~Blood Culture 1   Culture, Blood 2 [[id number]] Collected: 07/18/22 1850   Order Status: Completed Specimen: Blood Updated: 07/22/22 2015    Culture, Blood 2 No Growth after 4 days of incubation. Narrative:     ORDER#: [id number]                          ORDERED BY: Nasim Quintanilla   SOURCE: Blood                              COLLECTED:  07/18/22 18:50   ANTIBIOTICS AT GURWINDER.:                      RECEIVED :  07/18/22 18:56   If child <=2 yrs old please draw pediatric bottle. ~Blood Culture #2   Strep Pneumoniae Antigen [[id number]] Collected: 07/20/22 1020   Order Status: Completed Specimen: Urine, clean catch Updated: 07/21/22 0841    STREP PNEUMONIAE ANTIGEN, URINE --    Presumptive Negative   Presumptive negative suggests no current or recent   pneumococcal infection.  Infection due to Strep pneumoniae   cannot be ruled out since the antigen present in the sample   may be below the detection limit of the test.   Normal Range:Presumptive Negative    Narrative:     ORDER#: [id number]                          ORDERED BY: FARZANA GAN   SOURCE: Urine Clean Catch                  COLLECTED:  07/20/22 10:20   ANTIBIOTICS AT GURWINDER.: RECEIVED :  07/20/22 10:27   Legionella antigen, urine [[id number]] Collected: 07/20/22 1020   Order Status: Completed Specimen: Urine, catheter Updated: 07/21/22 0840    L. pneumophila Serogp 1 Ur Ag --    Presumptive Negative   No Legionella pneumophila serogroup 1 antigens detected. A negative result does not exclude infection with   Legionella pneumophila serogroup 1 nor does it rule out   other microbial-caused respiratory infections or   disease caused by other serogroups of   Legionella pneumophila. Normal Range: Presumptive Negative    Narrative:     ORDER#: [id number]                          ORDERED BY: FARZANA GAN   SOURCE: Urine Catheter                     COLLECTED:  07/20/22 10:20   ANTIBIOTICS AT GURWINDER.:                      RECEIVED :  07/20/22 10:28   Sputum gram stain [[id number]] Collected: 07/19/22 2030   Order Status: Canceled Specimen: Sputum Expectorated    Rapid influenza A/B antigens [[id number]] Collected: 07/19/22 0210   Order Status: Completed Specimen: Nares Updated: 07/19/22 0245    Rapid Influenza A Ag Negative    Rapid Influenza B Ag Negative   COVID-19, Rapid [[id number]] Collected: 07/18/22 1825   Order Status: Completed Specimen: Nasopharyngeal Swab Updated: 07/18/22 1854    SARS-CoV-2, NAAT Not Detected    Comment: Rapid NAAT:   Negative results should be treated as presumptive and,   if inconsistent with clinical signs and symptoms or necessary for   patient management, should be tested with an alternative molecular   assay. Negative results do not preclude SARS-CoV-2 infection and   should not be used as the sole basis for patient management decisions. This test has been authorized by the FDA under an Emergency Use   Authorization (EUA) for use by authorized laboratories.      Fact sheet for Healthcare Providers:   Maykel.prashanth   Fact sheet for Patients: Maykel.prashanth     METHODOLOGY: Isothermal Nucleic Acid Amplification         CULTURE, RESPIRATORY Rare growth normal respiratory fabiana with Abnormal     Gram Stain Result No Epithelial Cells seen   3+ WBC's (Polymorphonuclear)   No organisms seen    Organism Pseudomonas aeruginosa Abnormal     CULTURE, RESPIRATORY Light growth    Resulting Agency [address] [facility]          Susceptibility      Pseudomonas aeruginosa (1)    Antibiotic Interpretation Microscan  Method Status    cefepime Sensitive 8 mcg/mL BACTERIAL SUSCEPTIBILITY PANEL BY TIERRA     ciprofloxacin Resistant >2 mcg/mL BACTERIAL SUSCEPTIBILITY PANEL BY TIERRA     gentamicin Sensitive <=4 mcg/mL BACTERIAL SUSCEPTIBILITY PANEL BY TIERRA     meropenem Sensitive <=1 mcg/mL BACTERIAL SUSCEPTIBILITY PANEL BY TIERRA     piperacillin-tazobactam Sensitive <=16 mcg/mL BACTERIAL SUSCEPTIBILITY PANEL BY TIERRA     tobramycin Sensitive <=4 mcg/mL BACTERIAL SUSCEPTIBILITY PANEL BY TIERRA          Narrative  Performed by: [facility]  ORDER#: [id number]                          ORDERED BY: CIRA KEBEDE   SOURCE: Sputum Expectorated                COLLECTED:  07/22/22 12:00   ANTIBIOTICS AT GURWINDER.:                      RECEIVED :  07/22/22 12:22           Lab Results   Component Value Date/Time    BC No Growth after 4 days of incubation. 07/18/2022 06:41 PM    BLOODCULT2 No Growth after 4 days of incubation. 07/18/2022 06:50 PM       Respiratory Culture:  Lab Results   Component Value Date/Time    CULTRESP Rare growth normal respiratory fabiana with 07/22/2022 12:00 PM    CULTRESP Light growth 07/22/2022 12:00 PM    LABGRAM  07/22/2022 12:00 PM     No Epithelial Cells seen  3+ WBC's (Polymorphonuclear)  No organisms seen       AFB:No results found for: AFBSMEAR  Viral Culture:  Lab Results   Component Value Date/Time    COVID19 Not Detected 07/18/2022 06:25 PM     Urine Culture: No results for input(s): LABURIN in the last 72 hours. IMAGING:    CT CHEST WO CONTRAST   Preliminary Result   1.  Mixed consolidative and ground-glass opacities as well as interstitial   thickening throughout the majority of the right lung compatible with   pneumonia. 2. There is a cavity in the right upper lobe demonstrating an air-fluid level   measuring up to 9.7 cm concerning for abscess formation. 3. Trace right pleural effusion. 4. Prominent and enlarged mediastinal lymph nodes, likely reactive. 5. Stable 6 mm left lower lobe pulmonary nodule. RECOMMENDATIONS:   Fleischner Society guidelines for follow-up and management of incidentally   detected pulmonary nodules:      Single Solid Nodule:      Nodule size less than 6 mm   In a low-risk patient, no routine follow-up. In a high-risk patient, optional CT at 12 months. Nodule size equals 6-8 mm   In a low-risk patient, CT at 6-12 months, then consider CT at 18-24 months. In a high-risk patient, CT at 6-12 months, then CT at 18-24 months. Nodule size greater than 8 mm         In a low-risk patient, consider CT at 3 months, PET/CT, or tissue sampling. In a high-risk patient, consider CT at 3 months, PET/CT, or tissue sampling. Multiple Solid Nodules:      Nodule size less than 6 mm   In a low-risk patient, no routine follow-up. In a high-risk patient, optional CT at 12 months. Nodule size equals 6-8 mm   In a low-risk patient, CT at 3-6 months, then consider CT at 18-24 months. In a high-risk patient, CT at 3-6 months, then CT at 18-24 months. Nodule size greater than 8 mm   In a low-risk patient, CT at 3-6 months, then consider CT at 18-24 months. In a high-risk patient, CT at 3-6 months, then CT at 18-24 months. - Low risk patients include individuals with minimal or absent history of   smoking and other known risk factors. - High risk patients include individuals with a history or smoking or known   risk factors. Radiology 2017 http://pubs. rsna.org/doi/full/10.1148/radiol. 5194948773         XR CHEST PORTABLE   Final Result   1. Increased pneumonia throughout the right lung remaining most prominent in   the right upper lobe. 2. Emphysema better demonstrated on CT. 3. Possible trace right pleural effusion. XR CHEST PORTABLE   Final Result   Improved aeration of the right chest with persistent consolidation in the mid   to upper right chest.           XR CHEST PORTABLE   Final Result   1. Increased pneumonia throughout the right lung remaining most prominent in   the right upper lobe. 2. Emphysema better demonstrated on CT. 3. Possible trace right pleural effusion. XR CHEST PORTABLE   Final Result   Improved aeration of the right chest with persistent consolidation in the mid   to upper right chest.                All pertinent images and reports for the current Hospitalization were reviewed by me.        Scheduled Meds:   meropenem  1,000 mg IntraVENous Q8H    tobramycin (PF)  300 mg Nebulization BID    sodium chloride  500 mL IntraVENous Once    aspirin  81 mg Oral Daily    nystatin  5 mL Oral 4x Daily    prasugrel  10 mg Oral Daily    sodium chloride flush  5-40 mL IntraVENous 2 times per day    atorvastatin  40 mg Oral Nightly    tiZANidine  4 mg Oral Nightly    pantoprazole  40 mg Oral Daily    gabapentin  600 mg Oral BID    DULoxetine  60 mg Oral BID    insulin lispro  0-12 Units SubCUTAneous TID WC    insulin lispro  0-6 Units SubCUTAneous Nightly    mometasone-formoterol  2 puff Inhalation BID    And    tiotropium  2 puff Inhalation Daily       Continuous Infusions:   sodium chloride      sodium chloride 5 mL/hr at 07/25/22 0502    dextrose         PRN Meds:  sodium chloride, sodium chloride flush, sodium chloride, acetaminophen, oxyCODONE, HYDROcodone 5 mg - acetaminophen, perflutren lipid microspheres, perflutren lipid microspheres, acetaminophen, traZODone, ipratropium-albuterol, cetirizine, albuterol sulfate HFA, glucose, dextrose bolus **OR** dextrose bolus, glucagon (rDNA), dextrose, ondansetron, polyethylene glycol, acetaminophen **OR** acetaminophen      Assessment:     Patient Active Problem List   Diagnosis    Osteopenia-last dexa 2009 repeat 2015 (-2.4 hip)--advised tx    Colon polyp, hyperplastic,-(done 3/11-repeat 3-5 yrs--dr Goran Wahl)    Family history of colon cancer    CTS (carpal tunnel syndrome)BILATERAL-s/p surgical repair--resolved     Postmenopausal status-last mammogram 2/15 wnl last pap 12/13--wnl    Nondependent alcohol abuse, in remission    Urticaria, chronic    Smoking    Chronic back pain-(djd) saw dr Seymour Horan afford surgery--seeing dr Marilyn Blake for pain meds    Fibromyalgia    Hypercholesteremia    Lumbar spondylosis    Vitamin D deficiency--severe--started 50,000 iu 2x/ wk 11/13    Insomnia--on elevil w help    Constipation--on daily miralax    Depression    Chronic pain syndrome    Muscle cramping    Acute on chronic respiratory failure with hypercapnia ([address]    ROLY (acute kidney injury) ([address]    Severe sepsis (HCC)    Gastroesophageal reflux disease    COPD, severe ([address]    Chronic hypoxemic respiratory failure ([address]    Degeneration of lumbar or lumbosacral intervertebral disc    Trochanteric bursitis of right hip    COPD exacerbation ([address]    Diabetes ([address]    Controlled type 2 diabetes mellitus without complication, without long-term current use of insulin ([address]    Age-related osteoporosis without current pathological fracture- started alendronate 9/2021    Pulmonary nodule seen on imaging study    Pneumonia of right lung due to infectious organism    General weakness    Elevated lactic acid level    Community acquired pneumonia of right lung    Chronic obstructive pulmonary disease ([address]    Acute respiratory failure with hypoxia (HCC)    Acute on chronic respiratory failure with hypoxia and hypercapnia (HCC)    Abnormal EKG    NSTEMI (non-ST elevated myocardial infarction) ([address]    Necrotizing pneumonia (HCC)    Abscess of upper lobe of right lung with pneumonia ([address] Pseudomonas respiratory infection    Abnormal CT of the chest     Severe necrotizing Pseudomonas pneumonia  Right upper lobe cavitary lesion  Right right lung evolving abscess  Right lung dense consolidation of  Pseudomonas pneumonia developing some resistance  COPD exacerbation  Hypoxic respiratory failure  Coronary artery disease  Status post cardiac cath  Chronic smoking  Abnormal CT chest  BNP elevation  COVID-19 negative        Unfortunately she developed progressive changes with dense consolidation and lung abscess secondary to Pseudomonas pneumonia. Pseudomonas appears to have developed resistance to quinolones while on therapy this is concerning. CT chest on this admission grossly abnormal and definitely there is progressive changes given the severity of the -pneumonia will need IV antibiotics     OPAT d/w pt she needs to QUIT smoking d/w pt and her family         Labs, Microbiology, Radiology and all the pertinent results from current hospitalization and  care every where were reviewed  by me as a part of the evaluation   Plan:   IV Meropenem 1 gm q 8 HR X 21 DAYS  Inh Tobramycin Neb Q 12 hRS  Picc line placed   OPAT d/w pt  QUIT smoking  Will repeat CT chest in  3 weeks  Cont supportive care  CT images reviewed see above    Risk for progression d/w pt         Discussed with patient/Family and Nursing   Risk of Complications/Morbidity: High      Illness(es)/ Infection present that pose threat to bodily function. There is potential for severe exacerbation of infection/side effects of treatment. Therapy requires intensive monitoring for antimicrobial agent toxicity. Discussed with patient/Family and Nursing staff     Thanks for allowing me to participate in your patient's care and please call me with any questions or concerns.     Maksim Pulido MD  Infectious Disease  [facility] ([facility]) Physician  Phone: [phone]   Fax : [phone]

## 2022-07-26 NOTE — PROGRESS NOTES
Comprehensive Nutrition Assessment    Type and Reason for Visit:  Reassess    Nutrition Recommendations/Plan:   Low Na diet   Will reorder ONS TID     Malnutrition Assessment:  Malnutrition Status: At risk for malnutrition (Comment) (07/20/22 1218)    Context:  Chronic Illness     Findings of the 6 clinical characteristics of malnutrition:  Energy Intake:  Mild decrease in energy intake (Comment)  Weight Loss:  No significant weight loss     Body Fat Loss:  Mild body fat loss Orbital   Muscle Mass Loss:  No significant muscle mass loss    Fluid Accumulation:  Mild Extremities   Strength:  Not Performed    Nutrition Assessment:    Follow-up. Pt continues on oxygen, 5 L via NC. On Low Na diet, intakes remain varied and probably inadequate. Will reorder ONS as pt was accepting well (was d/c when pt went NPO). Nutrition Related Findings:    Reviewed labs Wound Type: None       Current Nutrition Intake & Therapies:    Average Meal Intake: 1-25%, %  Average Supplements Intake: None Ordered  ADULT DIET; Regular; Low Sodium (2 gm)    Anthropometric Measures:  Height: 5' 4\" (162.6 cm)  Ideal Body Weight (IBW): 120 lbs (55 kg)    Admission Body Weight: 135 lb 9.3 oz (61.5 kg)  Current Body Weight: 141 lb (64 kg), 111 % IBW.  Weight Source: Standing Scale  Current BMI (kg/m2): 24.2  Usual Body Weight: 139 lb (63 kg)  % Weight Change (Calculated): -4.2                    BMI Categories: Normal Weight (BMI 22.0 to 24.9) age over 72    Estimated Daily Nutrient Needs:        Energy (kcal/day): 9448-0626 (25-30 x CBW 60)     Protein (g/day): 60- 72 (1-1.2 x CBW 60)     Fluid (ml/day): per provider    Nutrition Diagnosis:   Inadequate oral intake related to other (comment) (mouth sores) as evidenced by poor intake prior to admission    Nutrition Interventions:   Food and/or Nutrient Delivery: Continue Current Diet, Start Oral Nutrition Supplement  Nutrition Education/Counseling: Education not indicated  Coordination of Nutrition Care: Continue to monitor while inpatient       Goals:  Previous Goal Met: Progressing toward Goal(s)  Goals: PO intake 50% or greater       Nutrition Monitoring and Evaluation:   Behavioral-Environmental Outcomes: None Identified  Food/Nutrient Intake Outcomes: Food and Nutrient Intake, Supplement Intake  Physical Signs/Symptoms Outcomes: Biochemical Data, Fluid Status or Edema, Nutrition Focused Physical Findings, Skin, Weight    Discharge Planning:     Too soon to determine     Amos Herrera, [address] [place] [address],   Contact: [phone]

## 2022-07-26 NOTE — PROGRESS NOTES
Pulmonary Progress Note    Date of Admission: 7/18/2022   LOS: 8 days     Chief Complaint   Patient presents with    Shortness of Breath     At [facility] for rehab for pneumonia increased sob over the last few days. Pt on 4-5 lpm via nc all the time. Per EMS the cord was very long ems placed on 6lpm via nc and o2 sat came up to 99%       Assessment/Plan:     Necrotizing pneumonia  Pseudomonas pneumonia, right upper lobe  -Abscess right upper lobe  -On Merrem/LUCILLE  -ID following    Acute hypoxemic respiratory failure  -Wean supplemental oxygen to goal saturation of >90%    Moderate COPD  -Dulera, Spiriva, albuterol as needed    24 Hour Events/Subjective  No acute events overnight. Evaluated by ID yesterday switched to Merrem and LUCILLE    ROS:   No nausea  No Vomiting  No chest pain      Intake/Output Summary (Last 24 hours) at 7/26/2022 0734  Last data filed at 7/25/2022 1957  Gross per 24 hour   Intake 480 ml   Output 800 ml   Net -320 ml         PHYSICAL EXAM:   Blood pressure 135/74, pulse 79, temperature 97.7 °F (36.5 °C), temperature source Oral, resp. rate 21, height 5' 4\" (1.626 m), weight 141 lb 1.5 oz (64 kg), SpO2 96 %, not currently breastfeeding.'  Gen:  No acute distress. Eyes: PERRL. Anicteric sclera. No conjunctival injection. ENT: No discharge. Posterior oropharynx clear. External appearance of ears and nose normal.  Neck: Trachea midline. No mass   Resp:  No crackles. No wheezes. No rhonchi. No dullness on percussion. CV: Regular rate. Regular rhythm. No murmur or rub. No edema. GI: Soft, Non-tender. Non-distended. +BS  Skin: Warm, dry, w/o erythema. Lymph: No cervical or supraclavicular LAD. M/S: No cyanosis. No clubbing. Neuro:  no focal neurologic deficit. Moves all extremities  Psych: Awake and alert, Oriented x 3. Judgement and insight appropriate. Mood stable.       Medications:    Scheduled Meds:   meropenem  1,000 mg IntraVENous Q8H    tobramycin (PF)  300 mg Nebulization BID sodium chloride  500 mL IntraVENous Once    aspirin  81 mg Oral Daily    nystatin  5 mL Oral 4x Daily    prasugrel  10 mg Oral Daily    sodium chloride flush  5-40 mL IntraVENous 2 times per day    atorvastatin  40 mg Oral Nightly    tiZANidine  4 mg Oral Nightly    pantoprazole  40 mg Oral Daily    gabapentin  600 mg Oral BID    DULoxetine  60 mg Oral BID    insulin lispro  0-12 Units SubCUTAneous TID WC    insulin lispro  0-6 Units SubCUTAneous Nightly    mometasone-formoterol  2 puff Inhalation BID    And    tiotropium  2 puff Inhalation Daily       Continuous Infusions:   sodium chloride      sodium chloride 5 mL/hr at 07/25/22 0502    dextrose         PRN Meds:  sodium chloride, sodium chloride flush, sodium chloride, acetaminophen, oxyCODONE, HYDROcodone 5 mg - acetaminophen, perflutren lipid microspheres, perflutren lipid microspheres, acetaminophen, traZODone, ipratropium-albuterol, cetirizine, albuterol sulfate HFA, glucose, dextrose bolus **OR** dextrose bolus, glucagon (rDNA), dextrose, ondansetron, polyethylene glycol, acetaminophen **OR** acetaminophen    Labs reviewed:  CBC:   Recent Labs     07/24/22  0504 07/25/22  0853 07/26/22  0447   WBC 4.7 5.5 6.3   HGB 7.6* 8.4* 8.3*   HCT 22.1* 24.7* 24.2*   MCV 93.2 92.3 91.6    400 429     BMP:   Recent Labs     07/24/22  0504 07/25/22  0853 07/26/22  0447   * 136 137   K 3.7 3.7 3.5    104 103   CO2 24 24 26   BUN 9 9 6*   CREATININE 0.5* <0.5* <0.5*     LIVER PROFILE: No results for input(s): AST, ALT, LIPASE, BILIDIR, BILITOT, ALKPHOS in the last 72 hours. Invalid input(s): AMYLASE,  ALB  PT/INR: No results for input(s): PROTIME, INR in the last 72 hours. APTT: No results for input(s): APTT in the last 72 hours.   UA:  No results for input(s): NITRITE, COLORU, PHUR, LABCAST, WBCUA, RBCUA, MUCUS, TRICHOMONAS, YEAST, BACTERIA, CLARITYU, SPECGRAV, LEUKOCYTESUR, UROBILINOGEN, BILIRUBINUR, BLOODU, GLUCOSEU, AMORPHOUS in the last 72 hours.    Invalid input(s): KETONESU    No results for input(s): PH, PCO2, PO2 in the last 72 hours. Films:  Radiology Review:  Pertinent images / reports were reviewed as a part of this visit. This note was transcribed using [employer]. Please disregard any translational errors.     Thank you for this consult,    Carina Perry MD  [facility] Pulmonary, Critical Care, and Sleep Medicine

## 2022-07-26 NOTE — PROGRESS NOTES
Hospitalist Progress Note      PCP: Glen Tenorio, APRN - CNP    Date of Admission: 7/18/2022    Chief Complaint:   Chief Complaint   Patient presents with    Shortness of Breath     At [address] [facility] [facility] for rehab for pneumonia increased sob over the last few days. Pt on 4-5 lpm via nc all the time. Per EMS the cord was very long ems placed on 6lpm via nc and o2 sat came up to 99%         Hospital Course: Pt is an 78y.o. year-old female with a history of hyperlipidemia, fibromyalgia, chronic pain syndrome, severe COPD with chronic hypoxic respiratory failure requiring oxygen at 4 L/min via nasal cannula continuously. She presents to the emergency room for evaluation following a 2 to 3-day history of worsening shortness of breath. She was recently treated for Pseudomonas pneumonia and discharged to rehab. EMS reports that she was on a very long oxygen tube when they arrived. She was placed on 6 L nasal cannula and her oxygen saturation improved. In the emergency room she was found to have an abnormal EKG with inverted T waves in V2 through V5. Cardiology was consulted and asked that she be put on a heparin drip. Patient denies any current or recent chest pain. She is being admitted for further evaluation and treatment. Associated signs and symptoms do not include chest pain, lightheaded, dizziness, diaphoresis, edema, orthopnea, paroxysmal nocturnal dyspnea, fever or chills. No recent medication changes. Treated for pseudomonal pneumonia (recurrent)      7/19  PCI and stent to LCx by Dr Leonardo Morales    7/20-7/25 worsening pulm condition and xrays  Found to have lung abscess by Dr Amber Blanca with ID consulted    Subjective:    Feels ok. Denies complaints.   Cough with sputum        Medications:  Reviewed    Infusion Medications    sodium chloride      sodium chloride 5 mL/hr at 07/25/22 0502    dextrose       Scheduled Medications    meropenem  1,000 mg IntraVENous Q8H    tobramycin (PF)  300 mg Nebulization BID sodium chloride  500 mL IntraVENous Once    aspirin  81 mg Oral Daily    nystatin  5 mL Oral 4x Daily    prasugrel  10 mg Oral Daily    sodium chloride flush  5-40 mL IntraVENous 2 times per day    atorvastatin  40 mg Oral Nightly    tiZANidine  4 mg Oral Nightly    pantoprazole  40 mg Oral Daily    gabapentin  600 mg Oral BID    DULoxetine  60 mg Oral BID    insulin lispro  0-12 Units SubCUTAneous TID WC    insulin lispro  0-6 Units SubCUTAneous Nightly    mometasone-formoterol  2 puff Inhalation BID    And    tiotropium  2 puff Inhalation Daily     PRN Meds: sodium chloride, sodium chloride flush, sodium chloride, acetaminophen, oxyCODONE, HYDROcodone 5 mg - acetaminophen, perflutren lipid microspheres, perflutren lipid microspheres, acetaminophen, traZODone, ipratropium-albuterol, cetirizine, albuterol sulfate HFA, glucose, dextrose bolus **OR** dextrose bolus, glucagon (rDNA), dextrose, ondansetron, polyethylene glycol, acetaminophen **OR** acetaminophen      Intake/Output Summary (Last 24 hours) at 7/26/2022 1902  Last data filed at 7/26/2022 1842  Gross per 24 hour   Intake 720 ml   Output 1400 ml   Net -680 ml         Physical Exam Performed:    /67   Pulse (!) 102   Temp 97.3 °F (36.3 °C) (Oral)   Resp 18   Ht 5' 4\" (1.626 m)   Wt 141 lb 1.5 oz (64 kg)   SpO2 91%   BMI 24.22 kg/m²     General appearance: No apparent distress, appears stated age and cooperative. HEENT: Pupils equal, round, and reactive to light. Conjunctivae/corneas clear. Neck: Supple, with full range of motion. No jugular venous distention. Trachea midline. Respiratory: Right-sided rhonchi  Cardiovascular: Regular rate and rhythm with normal S1/S2 without murmurs, rubs or gallops. Abdomen: Soft, non-tender, non-distended with normal bowel sounds. Musculoskeletal: No clubbing, cyanosis or edema bilaterally. Full range of motion without deformity.   Skin: Skin color, texture, turgor normal.  No rashes or lesions. Neurologic:  Neurovascularly intact without any focal sensory/motor deficits. Cranial nerves: II-XII intact, grossly non-focal.  Psychiatric: Alert and oriented, thought content appropriate, normal insight  Capillary Refill: Brisk,3 seconds, normal   Peripheral Pulses: +2 palpable, equal bilaterally       Labs:   Recent Labs     07/24/22  0504 07/25/22  0853 07/26/22  0447   WBC 4.7 5.5 6.3   HGB 7.6* 8.4* 8.3*   HCT 22.1* 24.7* 24.2*    400 429       Recent Labs     07/24/22  0504 07/25/22  0853 07/26/22  0447   * 136 137   K 3.7 3.7 3.5    104 103   CO2 24 24 26   BUN 9 9 6*   CREATININE 0.5* <0.5* <0.5*   CALCIUM 7.1* 7.5* 7.9*       No results for input(s): AST, ALT, BILIDIR, BILITOT, ALKPHOS in the last 72 hours. No results for input(s): INR in the last 72 hours. No results for input(s): Angus Manriquez in the last 72 hours. 7/19/2022 cardiac cath     HEMODYNAMICS:  Aortic pressure was 140/80;  LVEDP 16. There was no gradient between the left ventricle and aorta. ANGIOGRAM/CORONARY ARTERIOGRAM:        The left main coronary artery is normal .     The left anterior descending artery has mild diffuse disease . The left circumflex artery is normal              OM1 99% . The right coronary artery is normal .     SUMMARY:   Single vessel CAD      Urinalysis:      Lab Results   Component Value Date/Time    NITRU Negative 07/22/2022 06:31 AM    WBCUA 2 07/22/2022 06:31 AM    BACTERIA None Seen 07/22/2022 06:31 AM    RBCUA 5 07/22/2022 06:31 AM    BLOODU Negative 07/22/2022 06:31 AM    SPECGRAV 1.049 07/22/2022 06:31 AM    GLUCOSEU Negative 07/22/2022 06:31 AM       Radiology:  CT CHEST WO CONTRAST   Preliminary Result   1. Mixed consolidative and ground-glass opacities as well as interstitial   thickening throughout the majority of the right lung compatible with   pneumonia.    2. There is a cavity in the right upper lobe demonstrating an air-fluid level measuring up to 9.7 cm concerning for abscess formation. 3. Trace right pleural effusion. 4. Prominent and enlarged mediastinal lymph nodes, likely reactive. 5. Stable 6 mm left lower lobe pulmonary nodule. RECOMMENDATIONS:   Fleischner Society guidelines for follow-up and management of incidentally   detected pulmonary nodules:      Single Solid Nodule:      Nodule size less than 6 mm   In a low-risk patient, no routine follow-up. In a high-risk patient, optional CT at 12 months. Nodule size equals 6-8 mm   In a low-risk patient, CT at 6-12 months, then consider CT at 18-24 months. In a high-risk patient, CT at 6-12 months, then CT at 18-24 months. Nodule size greater than 8 mm         In a low-risk patient, consider CT at 3 months, PET/CT, or tissue sampling. In a high-risk patient, consider CT at 3 months, PET/CT, or tissue sampling. Multiple Solid Nodules:      Nodule size less than 6 mm   In a low-risk patient, no routine follow-up. In a high-risk patient, optional CT at 12 months. Nodule size equals 6-8 mm   In a low-risk patient, CT at 3-6 months, then consider CT at 18-24 months. In a high-risk patient, CT at 3-6 months, then CT at 18-24 months. Nodule size greater than 8 mm   In a low-risk patient, CT at 3-6 months, then consider CT at 18-24 months. In a high-risk patient, CT at 3-6 months, then CT at 18-24 months. - Low risk patients include individuals with minimal or absent history of   smoking and other known risk factors. - High risk patients include individuals with a history or smoking or known   risk factors. Radiology 2017 http://pubs. rsna.org/doi/full/10.1148/radiol. 9345422806         XR CHEST PORTABLE   Final Result   1. Increased pneumonia throughout the right lung remaining most prominent in   the right upper lobe. 2. Emphysema better demonstrated on CT. 3. Possible trace right pleural effusion.          XR CHEST PORTABLE Final Result   Improved aeration of the right chest with persistent consolidation in the mid   to upper right chest.                 Assessment/Plan:    Active Hospital Problems    Diagnosis     NSTEMI (non-ST elevated myocardial infarction) ([facility] [I21.4]      Priority: Medium    Necrotizing pneumonia ([facility] [J85.0]      Priority: Medium    Abscess of upper lobe of right lung with pneumonia ([facility] [J85.1]      Priority: Medium    Pseudomonas respiratory infection [J98.8, B96.5]      Priority: Medium    Abnormal CT of the chest [R93.89]      Priority: Medium    Acute on chronic respiratory failure with hypoxia and hypercapnia (HCC) [J96.21, J96.22]      Priority: Medium    Abnormal EKG [R94.31]      Priority: Medium    Chronic obstructive pulmonary disease (HCC) [J44.9]      Priority: Medium    Pneumonia of right lung due to infectious organism [J18.9]      Priority: Medium    COPD, severe ([facility] [J44.9]     Chronic pain syndrome [G89.4]     Hypercholesteremia [E78.00]     Fibromyalgia [M79.7]     Smoking [F17.200]      Acute on chronic respiratory failure with hypoxia and hypercapnia (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 Will require proloniged abx for abscess and close follow up visits from NH to doctors offices  Pulmonary and ID following    Critical coronary artery disease single-vessel  Status post PCI to left circumflex by Dr Christine Murphy on this admission  with stent  Now on antiplatelet therapy    Anemia  No evidence of bleeding  Suspect anemia of chronic disease  Will check iron studies TIBC ferritin % and reticulocyte count  No indication to transfuse at this point  ? Benefit to EPO      COPD, severe ([facility] - without acute exacerbation. Will provide Nebulizer treatments as needed and continue home medications.  Patient will be monitored closely, and deep breathing and coughing will be encouraged while awake. Hyperlipidemia - No current evidence of Rhabdomyolysis or other adverse effects. Continue statin therapy while in the hospital     Chronic pain syndrome -continue her home pain med regimen     Fibromyalgia - provide supportive care       DVT Prophylaxis: heparin  Diet: ADULT DIET; Regular; Low Sodium (2 gm)  ADULT ORAL NUTRITION SUPPLEMENT; Breakfast, Lunch, Dinner; Standard High Calorie/High Protein Oral Supplement  Code Status: Full Code    PT/OT Eval Status: requested    Dispo - return to NH when medically stable.   Patient is quite ill right now nothing likely in the next several days  Feli Ledezma MD

## 2022-07-27 ENCOUNTER — APPOINTMENT (OUTPATIENT)
Dept: GENERAL RADIOLOGY | Age: 77
DRG: 246 | End: 2022-07-27
Payer: MEDICARE

## 2022-07-27 LAB
ANION GAP SERPL CALCULATED.3IONS-SCNC: 8 MMOL/L (ref 3–16)
BANDED NEUTROPHILS RELATIVE PERCENT: 1 % (ref 0–7)
BASE EXCESS ARTERIAL: -4.2 MMOL/L (ref -3–3)
BASOPHILS ABSOLUTE: 0 K/UL (ref 0–0.2)
BASOPHILS RELATIVE PERCENT: 0 %
BUN BLDV-MCNC: 5 MG/DL (ref 7–20)
CALCIUM SERPL-MCNC: 7.8 MG/DL (ref 8.3–10.6)
CARBOXYHEMOGLOBIN ARTERIAL: 0.4 % (ref 0–1.5)
CHLORIDE BLD-SCNC: 102 MMOL/L (ref 99–110)
CO2: 28 MMOL/L (ref 21–32)
CREAT SERPL-MCNC: <0.5 MG/DL (ref 0.6–1.2)
EKG ATRIAL RATE: 140 BPM
EKG DIAGNOSIS: NORMAL
EKG P AXIS: 59 DEGREES
EKG P-R INTERVAL: 142 MS
EKG Q-T INTERVAL: 264 MS
EKG QRS DURATION: 82 MS
EKG QTC CALCULATION (BAZETT): 403 MS
EKG R AXIS: 53 DEGREES
EKG T AXIS: 52 DEGREES
EKG VENTRICULAR RATE: 140 BPM
EOSINOPHILS ABSOLUTE: 0.4 K/UL (ref 0–0.6)
EOSINOPHILS RELATIVE PERCENT: 6 %
GFR AFRICAN AMERICAN: >60
GFR NON-AFRICAN AMERICAN: >60
GLUCOSE BLD-MCNC: 105 MG/DL (ref 70–99)
GLUCOSE BLD-MCNC: 134 MG/DL (ref 70–99)
GLUCOSE BLD-MCNC: 141 MG/DL (ref 70–99)
GLUCOSE BLD-MCNC: 146 MG/DL (ref 70–99)
GLUCOSE BLD-MCNC: 149 MG/DL (ref 70–99)
GLUCOSE BLD-MCNC: 159 MG/DL (ref 70–99)
GLUCOSE BLD-MCNC: 164 MG/DL (ref 70–99)
GLUCOSE BLD-MCNC: 97 MG/DL (ref 70–99)
HCO3 ARTERIAL: 23.4 MMOL/L (ref 21–29)
HCT VFR BLD CALC: 25.3 % (ref 36–48)
HEMOGLOBIN, ART, EXTENDED: 10.1 G/DL (ref 12–16)
HEMOGLOBIN: 8.9 G/DL (ref 12–16)
LYMPHOCYTES ABSOLUTE: 0.7 K/UL (ref 1–5.1)
LYMPHOCYTES RELATIVE PERCENT: 10 %
MACROCYTES: ABNORMAL
MAGNESIUM: 1.7 MG/DL (ref 1.8–2.4)
MCH RBC QN AUTO: 31.6 PG (ref 26–34)
MCHC RBC AUTO-ENTMCNC: 35.1 G/DL (ref 31–36)
MCV RBC AUTO: 90.1 FL (ref 80–100)
METHEMOGLOBIN ARTERIAL: 0.4 %
MICROCYTES: ABNORMAL
MONOCYTES ABSOLUTE: 0.3 K/UL (ref 0–1.3)
MONOCYTES RELATIVE PERCENT: 5 %
NEUTROPHILS ABSOLUTE: 5.3 K/UL (ref 1.7–7.7)
NEUTROPHILS RELATIVE PERCENT: 78 %
O2 SAT, ARTERIAL: 98.6 %
O2 THERAPY: ABNORMAL
OVALOCYTES: ABNORMAL
PCO2 ARTERIAL: 54.7 MMHG (ref 35–45)
PDW BLD-RTO: 14.2 % (ref 12.4–15.4)
PERFORMED ON: ABNORMAL
PERFORMED ON: NORMAL
PH ARTERIAL: 7.24 (ref 7.35–7.45)
PHOSPHORUS: 3.1 MG/DL (ref 2.5–4.9)
PLATELET # BLD: 477 K/UL (ref 135–450)
PMV BLD AUTO: 6.8 FL (ref 5–10.5)
PO2 ARTERIAL: 163 MMHG (ref 75–108)
POIKILOCYTES: ABNORMAL
POTASSIUM REFLEX MAGNESIUM: 3.6 MMOL/L (ref 3.5–5.1)
RBC # BLD: 2.81 M/UL (ref 4–5.2)
SCHISTOCYTES: ABNORMAL
SLIDE REVIEW: ABNORMAL
SODIUM BLD-SCNC: 138 MMOL/L (ref 136–145)
TCO2 ARTERIAL: 25.1 MMOL/L
WBC # BLD: 6.7 K/UL (ref 4–11)

## 2022-07-27 PROCEDURE — 6360000002 HC RX W HCPCS: Performed by: INTERNAL MEDICINE

## 2022-07-27 PROCEDURE — 9990000010 HC NO CHARGE VISIT: Performed by: PHYSICAL THERAPIST

## 2022-07-27 PROCEDURE — 6370000000 HC RX 637 (ALT 250 FOR IP): Performed by: INTERNAL MEDICINE

## 2022-07-27 PROCEDURE — 2580000003 HC RX 258: Performed by: INTERNAL MEDICINE

## 2022-07-27 PROCEDURE — 94761 N-INVAS EAR/PLS OXIMETRY MLT: CPT

## 2022-07-27 PROCEDURE — 6370000000 HC RX 637 (ALT 250 FOR IP): Performed by: NURSE PRACTITIONER

## 2022-07-27 PROCEDURE — 36415 COLL VENOUS BLD VENIPUNCTURE: CPT

## 2022-07-27 PROCEDURE — 83735 ASSAY OF MAGNESIUM: CPT

## 2022-07-27 PROCEDURE — 93010 ELECTROCARDIOGRAM REPORT: CPT | Performed by: INTERNAL MEDICINE

## 2022-07-27 PROCEDURE — 82803 BLOOD GASES ANY COMBINATION: CPT

## 2022-07-27 PROCEDURE — 36600 WITHDRAWAL OF ARTERIAL BLOOD: CPT

## 2022-07-27 PROCEDURE — 84100 ASSAY OF PHOSPHORUS: CPT

## 2022-07-27 PROCEDURE — 99233 SBSQ HOSP IP/OBS HIGH 50: CPT | Performed by: INTERNAL MEDICINE

## 2022-07-27 PROCEDURE — 94640 AIRWAY INHALATION TREATMENT: CPT

## 2022-07-27 PROCEDURE — 2500000003 HC RX 250 WO HCPCS: Performed by: NURSE PRACTITIONER

## 2022-07-27 PROCEDURE — 2700000000 HC OXYGEN THERAPY PER DAY

## 2022-07-27 PROCEDURE — 94660 CPAP INITIATION&MGMT: CPT

## 2022-07-27 PROCEDURE — 93005 ELECTROCARDIOGRAM TRACING: CPT | Performed by: INTERNAL MEDICINE

## 2022-07-27 PROCEDURE — 99291 CRITICAL CARE FIRST HOUR: CPT | Performed by: INTERNAL MEDICINE

## 2022-07-27 PROCEDURE — 80048 BASIC METABOLIC PNL TOTAL CA: CPT

## 2022-07-27 PROCEDURE — 6360000002 HC RX W HCPCS: Performed by: NURSE PRACTITIONER

## 2022-07-27 PROCEDURE — 71045 X-RAY EXAM CHEST 1 VIEW: CPT

## 2022-07-27 PROCEDURE — 2000000000 HC ICU R&B

## 2022-07-27 PROCEDURE — 85025 COMPLETE CBC W/AUTO DIFF WBC: CPT

## 2022-07-27 RX ORDER — MAGNESIUM SULFATE 1 G/100ML
1000 INJECTION INTRAVENOUS ONCE
Status: COMPLETED | OUTPATIENT
Start: 2022-07-27 | End: 2022-07-27

## 2022-07-27 RX ORDER — POTASSIUM CHLORIDE 29.8 MG/ML
20 INJECTION INTRAVENOUS ONCE
Status: COMPLETED | OUTPATIENT
Start: 2022-07-27 | End: 2022-07-27

## 2022-07-27 RX ORDER — METHYLPREDNISOLONE SODIUM SUCCINATE 40 MG/ML
40 INJECTION, POWDER, LYOPHILIZED, FOR SOLUTION INTRAMUSCULAR; INTRAVENOUS ONCE
Status: COMPLETED | OUTPATIENT
Start: 2022-07-27 | End: 2022-07-27

## 2022-07-27 RX ORDER — CALCIUM GLUCONATE 20 MG/ML
1000 INJECTION, SOLUTION INTRAVENOUS ONCE
Status: COMPLETED | OUTPATIENT
Start: 2022-07-27 | End: 2022-07-27

## 2022-07-27 RX ORDER — FUROSEMIDE 10 MG/ML
40 INJECTION INTRAMUSCULAR; INTRAVENOUS ONCE
Status: COMPLETED | OUTPATIENT
Start: 2022-07-27 | End: 2022-07-27

## 2022-07-27 RX ORDER — METHYLPREDNISOLONE SODIUM SUCCINATE 125 MG/2ML
125 INJECTION, POWDER, LYOPHILIZED, FOR SOLUTION INTRAMUSCULAR; INTRAVENOUS ONCE
Status: DISCONTINUED | OUTPATIENT
Start: 2022-07-27 | End: 2022-07-27

## 2022-07-27 RX ADMIN — SODIUM CHLORIDE, PRESERVATIVE FREE 10 ML: 5 INJECTION INTRAVENOUS at 09:44

## 2022-07-27 RX ADMIN — FUROSEMIDE 40 MG: 10 INJECTION, SOLUTION INTRAMUSCULAR; INTRAVENOUS at 08:08

## 2022-07-27 RX ADMIN — NYSTATIN 500000 UNITS: 100000 SUSPENSION ORAL at 16:36

## 2022-07-27 RX ADMIN — METHYLPREDNISOLONE SODIUM SUCCINATE 40 MG: 40 INJECTION, POWDER, FOR SOLUTION INTRAMUSCULAR; INTRAVENOUS at 08:09

## 2022-07-27 RX ADMIN — MEROPENEM 1000 MG: 1 INJECTION, POWDER, FOR SOLUTION INTRAVENOUS at 06:09

## 2022-07-27 RX ADMIN — GABAPENTIN 600 MG: 300 CAPSULE ORAL at 20:34

## 2022-07-27 RX ADMIN — ACETAMINOPHEN 650 MG: 325 TABLET ORAL at 16:36

## 2022-07-27 RX ADMIN — MOMETASONE FUROATE AND FORMOTEROL FUMARATE DIHYDRATE 2 PUFF: 200; 5 AEROSOL RESPIRATORY (INHALATION) at 20:36

## 2022-07-27 RX ADMIN — SODIUM CHLORIDE, PRESERVATIVE FREE 10 ML: 5 INJECTION INTRAVENOUS at 21:00

## 2022-07-27 RX ADMIN — CALCIUM GLUCONATE 1000 MG: 20 INJECTION, SOLUTION INTRAVENOUS at 11:51

## 2022-07-27 RX ADMIN — TOBRAMYCIN 300 MG: 300 SOLUTION RESPIRATORY (INHALATION) at 20:40

## 2022-07-27 RX ADMIN — POTASSIUM CHLORIDE 20 MEQ: 29.8 INJECTION, SOLUTION INTRAVENOUS at 10:00

## 2022-07-27 RX ADMIN — INSULIN LISPRO 2 UNITS: 100 INJECTION, SOLUTION INTRAVENOUS; SUBCUTANEOUS at 09:47

## 2022-07-27 RX ADMIN — DULOXETINE HYDROCHLORIDE 60 MG: 60 CAPSULE, DELAYED RELEASE ORAL at 20:34

## 2022-07-27 RX ADMIN — IPRATROPIUM BROMIDE AND ALBUTEROL SULFATE 1 AMPULE: .5; 3 SOLUTION RESPIRATORY (INHALATION) at 12:29

## 2022-07-27 RX ADMIN — MEROPENEM 1000 MG: 1 INJECTION, POWDER, FOR SOLUTION INTRAVENOUS at 22:07

## 2022-07-27 RX ADMIN — IPRATROPIUM BROMIDE AND ALBUTEROL SULFATE 1 AMPULE: .5; 3 SOLUTION RESPIRATORY (INHALATION) at 08:36

## 2022-07-27 RX ADMIN — TOBRAMYCIN 300 MG: 300 SOLUTION RESPIRATORY (INHALATION) at 10:04

## 2022-07-27 RX ADMIN — INSULIN LISPRO 2 UNITS: 100 INJECTION, SOLUTION INTRAVENOUS; SUBCUTANEOUS at 12:23

## 2022-07-27 RX ADMIN — MEROPENEM 1000 MG: 1 INJECTION, POWDER, FOR SOLUTION INTRAVENOUS at 14:40

## 2022-07-27 RX ADMIN — ATORVASTATIN CALCIUM 40 MG: 40 TABLET, FILM COATED ORAL at 20:34

## 2022-07-27 RX ADMIN — INSULIN LISPRO 2 UNITS: 100 INJECTION, SOLUTION INTRAVENOUS; SUBCUTANEOUS at 16:36

## 2022-07-27 RX ADMIN — TIZANIDINE 4 MG: 4 TABLET ORAL at 20:51

## 2022-07-27 RX ADMIN — NYSTATIN 500000 UNITS: 100000 SUSPENSION ORAL at 20:51

## 2022-07-27 RX ADMIN — MAGNESIUM SULFATE HEPTAHYDRATE 1000 MG: 1 INJECTION, SOLUTION INTRAVENOUS at 09:53

## 2022-07-27 ASSESSMENT — PAIN DESCRIPTION - PAIN TYPE: TYPE: CHRONIC PAIN

## 2022-07-27 ASSESSMENT — PAIN SCALES - GENERAL
PAINLEVEL_OUTOF10: 9
PAINLEVEL_OUTOF10: 2
PAINLEVEL_OUTOF10: 0
PAINLEVEL_OUTOF10: 0
PAINLEVEL_OUTOF10: 2
PAINLEVEL_OUTOF10: 0
PAINLEVEL_OUTOF10: 0

## 2022-07-27 ASSESSMENT — PAIN DESCRIPTION - ORIENTATION
ORIENTATION: RIGHT;UPPER
ORIENTATION: INNER

## 2022-07-27 ASSESSMENT — PAIN DESCRIPTION - DESCRIPTORS: DESCRIPTORS: TENDER

## 2022-07-27 ASSESSMENT — PAIN DESCRIPTION - LOCATION
LOCATION: ABDOMEN
LOCATION: CHEST
LOCATION: CHEST

## 2022-07-27 ASSESSMENT — PAIN DESCRIPTION - FREQUENCY: FREQUENCY: CONTINUOUS

## 2022-07-27 NOTE — SIGNIFICANT EVENT
Rapid response called on this patient this morning as patient was hypoxic with saturation reported to be in 40s  Immediately reported to room  Patient in respiratory distress  Mildly confused  Awake  Coarse breath sounds with expiratory wheezes  Tachycardic  Abdomen soft  Moving all extremities  ABG ordered  Labs noted  Chest x-ray ordered  EKG ordered  Acute on chronic respiratory failure with hypoxia and hypercapnia, worsening   IV Lasix 40 mg ordered and given, IV Solu-Medrol 40 mg x 1 given  Chest x-ray noted with right-sided abscess and opacities  ABG with hypercapnia  Patient started on BiPAP  Oxygen improved  Patient transferred to intensive care unit as patient is at risk for life-threatening complications  Called and discussed with pulmonology and tadeo to give steroids  Patient still tachycardic but heart rate improved  Hemodynamically stable for now  Noted to have hypomagnesemia and magnesium replaced. Total critical care time including but not limited to physical exam, ordering and following on critical labs, ordering critical IV medications discussion with other subspecialty and not including any procedure time 38 minutes.

## 2022-07-27 NOTE — CARE COORDINATION
Patient transferred to ICU earlier this AM from room 5124 for respiratory needs. Discharge plan is to Home at [facility] for skilled care. Will require a precert with [employer] and a covid test within 24-48 hours prior to discharge.      Dashawn ORTIZ RN  Case Management  [phone]    Electronically signed by Dashawn Rich RN on 7/27/2022 at 1:10 PM

## 2022-07-27 NOTE — CONSULTS
Consultation H&P    Date of Admission:  7/18/2022  5:03 PM  Date of Consultation:  7/27/2022    PCP:  HECTOR Palumbo - CNP      Cards:   Pain: Haylee  Pulm: Maxx Mannheim  ID: Matilda Santos    Chief Complaint: nectrotizing pneumonia    History of Present Illness: We are asked to see this patient in consultation by Dr. Brian Meyer regarding necrotizing pneumonia. Mitchell Feng is a 78 y.o. female smoker with copd, chronic pain (Percocet, zanaflex, mobic, neurontin)  Admitted 7/3/22 - P.aer pna. Abx. D/c to [facility] 7/13 on O2 4L. Presented to ED 7/18 - dyspnea. Covid neg. 7/19 sput cx P.aer. trop 0.52, 0.7, probnp [id number]. Cath OM1 99%, PCI/stent, Brilinta.  7/22 Worsening cxr, sput cx P.aer.   7/23 CT chest  RUL abscess. 7/27 rapid response, o2 sat 40s, tx to ICU, bipap       Past Medical History:  Past Medical History:   Diagnosis Date    CAD (coronary artery disease) 07/19/2022    NSTEMI, PCI LCx    Chronic pain syndrome     Percocet. Dr. Nabeel Johnson    Colorectal polyps     COPD (chronic obstructive pulmonary disease) ([address]     CTS (carpal tunnel syndrome)     BILATERAL    DDD (degenerative disc disease), lumbar     Depression     Family hx of colon cancer     Fibromyalgia     Hyperlipemia     IBS (irritable bowel syndrome)     Lumbar spondylosis     New onset type 2 diabetes mellitus ([address] 02/03/2020    On home O2     4L    Pneumonia 07/2022    P.aer    Urticaria, chronic        Past Surgical History:  Past Surgical History:   Procedure Laterality Date    CARPAL TUNNEL RELEASE Bilateral     CATARACT EXTRACTION      CERVICAL POLYP REMOVAL  09/2004    CORONARY ANGIOPLASTY WITH STENT PLACEMENT  07/19/2022    LCx 99. PCI/stent.     INTRACAPSULAR CATARACT EXTRACTION Left 06/23/2020    Phacoemulsification with intraocular lens implant performed by Edward Hines MD at [address] Right 07/14/2020    Phacoemulsification with intraocular lens implant performed by Janene Duran MD at [address]      patient denies        Home Medications:   Prior to Admission medications    Medication Sig Start Date End Date Taking? Authorizing Provider   gabapentin (NEURONTIN) 600 MG tablet Take 1 tablet by mouth 2 times daily for 30 days.  7/13/22 8/12/22  Rojelio Barron MD   nystatin (MYCOSTATIN) 115909 UNIT/ML suspension Take 5 mLs by mouth 4 times daily 7/13/22   Rojelio Barron MD   benzocaine-menthol (CEPACOL SORE THROAT) 15-3.6 MG lozenge Take 1 lozenge by mouth every 2 hours as needed for Sore Throat 7/13/22   Rojelio Barron MD   DULoxetine (CYMBALTA) 60 MG extended release capsule TAKE 1 CAPSULE BY MOUTH TWICE A DAY 6/14/22   HECTOR Parkinson CNP   pantoprazole (PROTONIX) 40 MG tablet Take 1 tablet by mouth daily 6/7/22   Sheryl Nguyen MD   atorvastatin (LIPITOR) 80 MG tablet TAKE 1 TABLET BY MOUTH EVERY DAY FOR CHOLESTEROL 6/3/22   HECTOR Parkinson CNP   tiZANidine (ZANAFLEX) 4 MG tablet Take 1 tablet by mouth nightly 5/10/22   Sheryl Nguyen MD   meloxicam (MOBIC) 15 MG tablet Take 1 tablet by mouth daily 5/10/22   Sheryl Nguyen MD   traZODone (DESYREL) 50 MG tablet Take 1-2 tablets by mouth nightly 4/12/22   Sheryl Nguyen MD   Fluticasone-Umeclidin-Vilant (TRELEGY ELLIPTA) 200-62.5-25 MCG/INH AEPB Inhale 1 Inhaler into the lungs daily 4/5/22   HECTOR rGanados CNP   magnesium oxide (MGO) 400 (241.3 Mg) MG TABS tablet Take 1 tablet by mouth 2 times daily as needed (for cramping) 3/15/22   Sheryl Nguyen MD   alendronate (FOSAMAX) 70 MG tablet TAKE 1 TABLET BY MOUTH ONE TIME PER WEEK 3/8/22   HECTOR Parkinson CNP   albuterol sulfate  (90 Base) MCG/ACT inhaler Inhale 2 puffs into the lungs every 6 hours as needed for Shortness of Breath 12/29/21   Rosalina Schulz MD   Calcium Citrate-Vitamin D 500-500 MG-UNIT CHEW Take 1 tablet by mouth 2 times daily 9/27/21   HECTOR Parkinson - CNP   ONE TOUCH LANCETS MISC 1 each by Does not apply route daily 2/7/20   HECTOR Shah CNP   ipratropium-albuterol (DUONEB) 0.5-2.5 (3) MG/3ML SOLN nebulizer solution Take 1 aerosol every 4 hours for 2 days and then as needed for shortness of breath coughing and wheezing 9/4/19   Pj Putnam MD   Cholecalciferol (VITAMIN D) 2000 units TABS tablet Take 1 tablet by mouth daily 7/12/19   HECTOR Shah CNP   cetirizine (ZYRTEC) 10 MG tablet Take 10 mg by mouth daily as needed for Allergies    Historical Provider, MD   Nebulizers (COMPRESSOR/NEBULIZER) MISC 1 Units by Does not apply route 4 times daily 3/30/19   Glenn Hoang MD        Facility Administered Medications:    meropenem  1,000 mg IntraVENous Q8H    tobramycin (PF)  300 mg Nebulization BID    sodium chloride  500 mL IntraVENous Once    aspirin  81 mg Oral Daily    nystatin  5 mL Oral 4x Daily    prasugrel  10 mg Oral Daily    sodium chloride flush  5-40 mL IntraVENous 2 times per day    atorvastatin  40 mg Oral Nightly    tiZANidine  4 mg Oral Nightly    pantoprazole  40 mg Oral Daily    gabapentin  600 mg Oral BID    DULoxetine  60 mg Oral BID    insulin lispro  0-12 Units SubCUTAneous TID WC    insulin lispro  0-6 Units SubCUTAneous Nightly    mometasone-formoterol  2 puff Inhalation BID    And    tiotropium  2 puff Inhalation Daily       Allergies:  No Known Allergies     Social History:     Social History     Socioeconomic History    Marital status: Single     Spouse name: Not on file    Number of children: 4    Years of education: Not on file    Highest education level: Not on file   Occupational History    Occupation: retired---[employer]   Tobacco Use    Smoking status: Every Day     Packs/day: 1.00     Years: 55.00     Pack years: 55.00     Types: Cigarettes     Start date: 1/1/1962    Smokeless tobacco: Never    Tobacco comments:     almost ready to quit   Vaping Use    Vaping Use: Never used   Substance and Sexual Activity    Alcohol use: No     Alcohol/week: 0.0 standard drinks    Drug use: No    Sexual activity: Yes     Partners: Male   Other Topics Concern    Not on file   Social History Narrative    Not on file     Social Determinants of Health     Financial Resource Strain: Unknown    Difficulty of Paying Living Expenses: Patient refused   Food Insecurity: Unknown    Worried About Running Out of Food in the Last Year: Patient refused    Ran Out of Food in the Last Year: Patient refused   Transportation Needs: Not on file   Physical Activity: Not on file   Stress: Not on file   Social Connections: Not on file   Intimate Partner Violence: Not on file   Housing Stability: Not on file       Family History:      Problem Relation Age of Onset    Cancer Father         COLON     Alzheimer's Disease Mother     Heart Disease Brother     Heart Failure Brother     Diabetes Daughter     Diabetes Daughter     Diabetes Daughter        Review of Systems:  Reviewed, negative unless noted  Constitutional: weight change, weakness, impairment of ADLs  Eyes: eyestrain, redness, discharges  ENMT: post nasal drip, sinus pain, discharge   Cardiovascular: faintness, vertigo, color changes in fingers/toes  Respiratory: cough, sputum, hemoptysis  GI: excessive thirst, changes in bowel habits, abdominal swelling  : painful urination, pyuria, incontinence  Musculoskeletal: cramps, swelling, limitation of motor activity  Integumentary: cyanosis, changes in skin, dryness  Neurological: paralysis, tingling, tremors  Psychiatric: restlessness, irritability, mood swings  Endocrine: heat/cold intolerance, excessive sweating, hair loss  Hematologic/lymphatic: swollen glands, anemia, easy bruising/bleeding      Physical Examination:    /71   Pulse (!) 113   Temp 99.5 °F (37.5 °C) (Axillary)   Resp 22   Ht 5' 4\" (1.626 m)   Wt 137 lb 12.6 oz (62.5 kg)   SpO2 99%   BMI 23.65 kg/m²      Admission Weight: 135 lb 9.3 oz (61.5 kg) General appearance: NAD, frail appearing  Eyes: anicteric  ENMT: dried secretions around mouth  Neck: no JVD  Respiratory: effort is slightly labored,  Skin: pale  Neuro: grossly intact. MEDICAL DECISION MAKING/TESTING  Studies personally reviewed. Cath: 7/19/22  The left main coronary artery is normal .   The left anterior descending artery has mild diffuse disease . The left circumflex artery is normal              OM1 99% . The right coronary artery is normal     Echo: 7/18/22  Left ventricular cavity size is normal with mild LV hypertrophy and normal   systolic function. Ejection fraction is visually estimated to be 55-60%. There are no regional wall motion abnormalities noted. Grade I diastolic dysfunction noted. The right ventricle is normal in size and function. There are no significant valvular abnormalities appreciated in this study. CXR:   7/3/22  Diffuse asymmetric right-sided airspace disease, greatest in the perihilar   region      7/18/22  Improved aeration of the right chest with persistent consolidation in the mid   to upper right chest.     7/27/22  A left PICC terminates over the superior vena cava. Cardiac silhouette and   mediastinal contours are normal.       There is multifocal airspace disease in the right lung, with greater   consolidation in the upper lobe. There are rounded lucencies within this   consolidation, suggestive of abscess, correlating with recent CT. Left lung   remains clear. CT chest:   3/29/22  1. COPD. 2. No significant change pulmonary nodules as discussed above. 7/23/22  Mediastinum: The esophagus is unremarkable. A few prominent and mildly   enlarged mediastinal lymph nodes, likely reactive. No pericardial effusion. Multivessel coronary artery disease. The thoracic   aorta is normal in course and caliber with scattered atherosclerotic disease. Lungs/pleura:  There is interstitial thickening and mixed ground-glass and 07/22/2022 06:31 AM    CLARITYU Clear 07/22/2022 06:31 AM    SPECGRAV 1.049 07/22/2022 06:31 AM    LEUKOCYTESUR Negative 07/22/2022 06:31 AM    UROBILINOGEN 0.2 07/22/2022 06:31 AM    BILIRUBINUR Negative 07/22/2022 06:31 AM    BILIRUBINUR neg 10/17/2019 11:50 AM    BLOODU Negative 07/22/2022 06:31 AM    GLUCOSEU Negative 07/22/2022 06:31 AM    AMORPHOUS 2+ 07/03/2022 03:11 PM       History obtained: chart, pt    Diagnosis: necrotizing pneumonia     Plan:   - pneumonia  First developed 7/3, persistent, progressive, now with cavitary/necrotizing component. All right side involved on most recent CT 7/23. Surgery to resect infected parenchymas would necessitate at least lobectomy and more likely pneumonectomy. I doubt single lung ventilation would be tolerated. Surgery would carry prohibitive risk, particularly for right pneumonectomy given copd, poor PFT in 2017, home O2, increased current requirement. Discussed with pt and family at bedside.    - COPD  - CAD. PCI 7/19. Brilinta.   - DM  - chronic pain     Nehemiah Martinez MD  7/27/2022  2:10 PM

## 2022-07-27 NOTE — PROGRESS NOTES
Occupational Therapy    Pt had rapid response called this date due to respiratory distress; pt being transferred to ICU due to medical decline; will sign off for therapy, pt will need new orders to resume therapy once medically stable.     Electronically signed by Sandee Titus OT on 7/27/2022 at 8:46 AM

## 2022-07-27 NOTE — PROGRESS NOTES
Patient transfer from [address] [place] [address] for increased oxygen requirements placed on BIPAP prior to transfer. HR increased ST into the 120-130's and patient increased lethargy from yesterday. Aftab Patel made aware of transfer. Patient placed in ICU bed, call light with in reach, PCU nurse Nannette Goins will notify family of transfer. NICK observed on coccyx, blanchable.

## 2022-07-27 NOTE — PROGRESS NOTES
Pulmonary Progress Note    Date of Admission: 7/18/2022   LOS: 9 days       CC:  Chief Complaint   Patient presents with    Shortness of Breath     At [facility] for rehab for pneumonia increased sob over the last few days. Pt on 4-5 lpm via nc all the time. Per EMS the cord was very long ems placed on 6lpm via nc and o2 sat came up to 99%        Subjective:  Patient was transferred for secondary to worsening respiratory status. Placed on BiPAP    ROS:   No nausea  No Vomiting  No chest pain      Assessment:         Plan: This note may have been transcribed using [id number] [employer]. Please disregard any translational errors. Hospital Day: 9     Right upper lobe pneumonia with cavitation/abscess  Currently on inhaled tobramycin and IV meropenem. Infectious disease following. Long discussion with daughter about concerns for progression, concerns about treatment with surgery. Daughter visualized all chest x-rays and CT chest expressed understanding    Acute hypercapnia respiratory failure  Transferred on bipap  Try intermittent breaks. discussed with nurse      COPD baseline 4L NC  FEV1 59% 2017  Dulera / Spiriva       Nutrition  - ADULT DIET; Regular; Low Sodium (2 gm)  ADULT ORAL NUTRITION SUPPLEMENT; Breakfast, Lunch, Dinner; Standard High Calorie/High Protein Oral Supplement  -   Intake/Output Summary (Last 24 hours) at 7/27/2022 0930  Last data filed at 7/27/2022 0858  Gross per 24 hour   Intake 720 ml   Output 3025 ml   Net -2305 ml       Access  Arterial      PICC      PICC Double Lumen 07/25/22 Left Brachial (Active)   Central Line Being Utilized Yes 07/27/22 0830   Criteria for Appropriate Use Limited/no vessel suitable for conventional peripheral access 07/27/22 0830   Site Assessment Clean, dry & intact; Flushed well 07/27/22 0830   Phlebitis Assessment No symptoms 07/27/22 0830   Infiltration Assessment 0 07/27/22 0830   Extremity Circumference (cm) 24 cm 07/27/22 0830   External Catheter Length (cm) 0 cm 07/27/22 0830   Proximal Lumen Color/Status Red;Capped;Normal saline locked; Flushed;Blood return noted 07/27/22 0830   Distal Lumen Color/Status Purple;Capped;Normal saline locked; Flushed;Blood return noted 07/27/22 0830   Line Care Cap changed 07/27/22 0830   Alcohol Cap Used Yes 07/27/22 0830   Date of Last Dressing Change 07/25/22 07/27/22 0830   Dressing Type Transparent; Antimicrobial;Securing device 07/27/22 0830   Dressing Status Clean, dry & intact 07/27/22 0830   Dressing Intervention New 07/26/22 1841   Number of days: 1        CVC                   I spent 40  minutes of critical care time with this patient excluding any procedures. Data:        PHYSICAL EXAM:   Blood pressure 83/69, pulse (!) 122, temperature 97.6 °F (36.4 °C), temperature source Axillary, resp. rate 30, height 5' 4\" (1.626 m), weight 137 lb 12.6 oz (62.5 kg), SpO2 95 %, not currently breastfeeding.'  Body mass index is 23.65 kg/m². Gen: No distress. ENT:   Resp: No accessory muscle use. No crackles. No wheezes. No rhonchi. CV: Regular rate. Regular rhythm. No murmur or rub. No edema. Skin: Warm, dry, normal texture and turgor. No nodule on exposed extremities. M/S: No cyanosis. No clubbing. No joint deformity. Psych: Oriented x 3. No anxiety. Awake. Alert. Intact judgement and insight. Good Mood / Affect.   Memory appears in tact     Currently on bipap       Medications:    Scheduled Meds:   magnesium sulfate  1,000 mg IntraVENous Once    meropenem  1,000 mg IntraVENous Q8H    tobramycin (PF)  300 mg Nebulization BID    sodium chloride  500 mL IntraVENous Once    aspirin  81 mg Oral Daily    nystatin  5 mL Oral 4x Daily    prasugrel  10 mg Oral Daily    sodium chloride flush  5-40 mL IntraVENous 2 times per day    atorvastatin  40 mg Oral Nightly    tiZANidine  4 mg Oral Nightly    pantoprazole  40 mg Oral Daily    gabapentin  600 mg Oral BID    DULoxetine  60 mg Oral BID    insulin lispro  0-12 Units SubCUTAneous TID WC    insulin lispro  0-6 Units SubCUTAneous Nightly    mometasone-formoterol  2 puff Inhalation BID    And    tiotropium  2 puff Inhalation Daily       Continuous Infusions:   sodium chloride      sodium chloride 5 mL/hr at 07/25/22 0502    dextrose         PRN Meds:  sodium chloride, sodium chloride flush, sodium chloride, acetaminophen, [Held by provider] oxyCODONE, [Held by provider] HYDROcodone 5 mg - acetaminophen, perflutren lipid microspheres, perflutren lipid microspheres, acetaminophen, traZODone, ipratropium-albuterol, cetirizine, albuterol sulfate HFA, glucose, dextrose bolus **OR** dextrose bolus, glucagon (rDNA), dextrose, ondansetron, polyethylene glycol, acetaminophen **OR** acetaminophen    Labs reviewed:  CBC:   Recent Labs     07/25/22  0853 07/26/22 0447 07/27/22  0358   WBC 5.5 6.3 6.7   HGB 8.4* 8.3* 8.9*   HCT 24.7* 24.2* 25.3*   MCV 92.3 91.6 90.1    429 477*     BMP:   Recent Labs     07/25/22  0853 07/26/22 0447 07/27/22  0358    137 138   K 3.7 3.5 3.6    103 102   CO2 24 26 28   BUN 9 6* 5*   CREATININE <0.5* <0.5* <0.5*     LIVER PROFILE: No results for input(s): AST, ALT, LIPASE, BILIDIR, BILITOT, ALKPHOS in the last 72 hours. Invalid input(s): AMYLASE,  ALB  PT/INR: No results for input(s): PROTIME, INR in the last 72 hours. APTT: No results for input(s): APTT in the last 72 hours. UA:No results for input(s): NITRITE, COLORU, PHUR, LABCAST, WBCUA, RBCUA, MUCUS, TRICHOMONAS, YEAST, BACTERIA, CLARITYU, SPECGRAV, LEUKOCYTESUR, UROBILINOGEN, BILIRUBINUR, BLOODU, GLUCOSEU, AMORPHOUS in the last 72 hours. Invalid input(s): Neel Spikes  No results for input(s): PH, PCO2, PO2 in the last 72 hours. Cx:      Films:  Radiology Review:  Pertinent images / reports were reviewed as a part of this visit. CT Chest w/ contrast: No results found for this or any previous visit.       CT Chest w/o contrast: Results for orders placed during the hospital encounter of 07/18/22    CT CHEST WO CONTRAST (Preliminary)  This result has not been signed. Information might be incomplete. Narrative  EXAMINATION:  CT OF THE CHEST WITHOUT CONTRAST 7/23/2022 11:34 am    TECHNIQUE:  CT of the chest was performed without the administration of intravenous  contrast. Multiplanar reformatted images are provided for review. Automated  exposure control, iterative reconstruction, and/or weight based adjustment of  the mA/kV was utilized to reduce the radiation dose to as low as reasonably  achievable. COMPARISON:  CT chest performed June 20, 2022. HISTORY:  ORDERING SYSTEM PROVIDED HISTORY: Worsening RUL PNA, evolving abscess? TECHNOLOGIST PROVIDED HISTORY:  Reason for exam:-> Worsening RUL PNA, evolving abscess? Reason for Exam: Worsening RUL PNA, evolving abscess? FINDINGS:  Mediastinum: The esophagus is unremarkable. A few prominent and mildly  enlarged mediastinal lymph nodes, likely reactive. No pericardial effusion. Multivessel coronary artery disease. The thoracic  aorta is normal in course and caliber with scattered atherosclerotic disease. Lungs/pleura: There is interstitial thickening and mixed ground-glass and  consolidative opacities throughout the majority of the right lung. There is  a cavity within the right upper lobe demonstrating an air-fluid level  measuring 9.7 x 4.2 cm. Trace right pleural effusion. Biapical pleural  thickening. Stable 6 mm nodule in the anterior left lower lobe (series 3,  image 94). Upper Abdomen: No acute abnormality of the visualized upper abdomen. Soft Tissues/Bones: Mild body wall edema. No acute osseous abnormality. Impression  1. Mixed consolidative and ground-glass opacities as well as interstitial  thickening throughout the majority of the right lung compatible with  pneumonia.   2. There is a cavity in the right upper lobe demonstrating an air-fluid level  measuring up to 9.7 cm concerning for abscess formation. 3. Trace right pleural effusion. 4. Prominent and enlarged mediastinal lymph nodes, likely reactive. 5. Stable 6 mm left lower lobe pulmonary nodule. RECOMMENDATIONS:  Fleischner Society guidelines for follow-up and management of incidentally  detected pulmonary nodules:    Single Solid Nodule:    Nodule size less than 6 mm  In a low-risk patient, no routine follow-up. In a high-risk patient, optional CT at 12 months. Nodule size equals 6-8 mm  In a low-risk patient, CT at 6-12 months, then consider CT at 18-24 months. In a high-risk patient, CT at 6-12 months, then CT at 18-24 months. Nodule size greater than 8 mm      In a low-risk patient, consider CT at 3 months, PET/CT, or tissue sampling. In a high-risk patient, consider CT at 3 months, PET/CT, or tissue sampling. Multiple Solid Nodules:    Nodule size less than 6 mm  In a low-risk patient, no routine follow-up. In a high-risk patient, optional CT at 12 months. Nodule size equals 6-8 mm  In a low-risk patient, CT at 3-6 months, then consider CT at 18-24 months. In a high-risk patient, CT at 3-6 months, then CT at 18-24 months. Nodule size greater than 8 mm  In a low-risk patient, CT at 3-6 months, then consider CT at 18-24 months. In a high-risk patient, CT at 3-6 months, then CT at 18-24 months. - Low risk patients include individuals with minimal or absent history of  smoking and other known risk factors. - High risk patients include individuals with a history or smoking or known  risk factors. Radiology 2017 http://pubs. rsna.org/doi/full/10.1148/radiol. 4818767136      CTPA: No results found for this or any previous visit.       CXR PA/LAT: Results for orders placed during the hospital encounter of 02/03/20    XR CHEST STANDARD (2 VW)    Narrative  EXAMINATION:  TWO XRAY VIEWS OF THE CHEST    2/3/2020 8:35 pm    COMPARISON:  01/03/2020    HISTORY:  ORDERING SYSTEM PROVIDED HISTORY: sob  TECHNOLOGIST PROVIDED HISTORY:  Reason for exam:->sob  Reason for Exam: sob  Acuity: Acute  Type of Exam: Initial    FINDINGS:  Chronic interstitial opacities are identified. No acute focal infiltrate is  identified. No pneumothorax is seen. No free air. The cardial pericardial  silhouette is unremarkable. The bones are demineralized. No acute bony  abnormality detected. Impression  No acute abnormality identified. CXR portable: Results for orders placed during the hospital encounter of 07/18/22    XR CHEST PORTABLE    Narrative  EXAMINATION:  ONE XRAY VIEW OF THE CHEST    7/27/2022 8:05 am    COMPARISON:  CT chest, 07/23/2022    HISTORY:  ORDERING SYSTEM PROVIDED HISTORY: resp failure  TECHNOLOGIST PROVIDED HISTORY:  Reason for exam:->resp failure  Reason for Exam: resp failure    FINDINGS:  Patient is rotated. A left PICC terminates over the superior vena cava. Cardiac silhouette and  mediastinal contours are normal.    There is multifocal airspace disease in the right lung, with greater  consolidation in the upper lobe. There are rounded lucencies within this  consolidation, suggestive of abscess, correlating with recent CT. Left lung  remains clear. Impression  Stable multifocal airspace disease in the right lung, including probable  pulmonary abscess in the right upper lobe. This note was transcribed using [id number] [employer]. Please disregard any translational errors.       [facility] [phone]

## 2022-07-27 NOTE — PROGRESS NOTES
Physical Therapy    Hailey Epps  [id number]  [id number]    Pt had rapid response called this date due to decreased O2 sats; pt being transferred to ICU due to medical decline; will sign off for therapy, pt will need new orders to resume therapy once medically stable  Electronically signed by THANH GERMAIN PT on 7/27/2022 at 8:17 AM

## 2022-07-27 NOTE — PROGRESS NOTES
[id number] called stating patient's oxygen was in the 76s. RN entered room and pateint's pulse OX was falling of patient's finger when applied new SPO2 monitor patient's oxygen was 48%. AN Attempt to place NRB on patient with no improvement of oxygen saturation. A rapid response was called. Patient placed on Bipap, Abg, ekg, cxr taken. Lasix, solumedrol given. Patient transferred to the UNIT. Daughter called and updated.     Electronically signed by Sandra Headley RN on 7/27/2022 at 8:48 AM

## 2022-07-27 NOTE — PROGRESS NOTES
disease), lumbar     Depression     Family hx of colon cancer     Fibromyalgia     Hyperlipemia     IBS (irritable bowel syndrome)     Lumbar spondylosis     New onset type 2 diabetes mellitus ([facility] [address] 02/03/2020    On home O2     4L    Pneumonia 07/2022    P.aer    Urticaria, chronic        Past Surgical History:    Past Surgical History:   Procedure Laterality Date    CARPAL TUNNEL RELEASE Bilateral     CERVICAL POLYP REMOVAL  09/2004    CORONARY ANGIOPLASTY WITH STENT PLACEMENT  07/19/2022    LCx 99. PCI/stent. INTRACAPSULAR CATARACT EXTRACTION Left 06/23/2020    Phacoemulsification with intraocular lens implant performed by Dipti Sanford MD at [address] Right 07/14/2020    Phacoemulsification with intraocular lens implant performed by Dipti Sanford MD at [address]      patient denies        Current Medications:    No outpatient medications have been marked as taking for the 7/18/22 encounter [facility] Encounter). Allergies:  Patient has no known allergies.     Immunizations :   Immunization History   Administered Date(s) Administered    COVID-19, MODERNA BLUE border, Primary or Immunocompromised, (age 12y+), IM, 100 mcg/0.5mL 03/08/2021, 04/05/2021    COVID-19, MODERNA Booster BLUE border, (age 18y+), IM, 50mcg/0.25mL 12/01/2021    Influenza Vaccine, unspecified formulation 01/10/2017    Influenza, High Dose (Fluzone 65 yrs and older) 11/04/2013, 01/21/2016, 09/01/2017, 10/30/2018, 09/18/2020    Influenza, Tennie Mock, adjuvanted, 65 yrs +, IM, PF (Fluad) 09/27/2021    Influenza, Triv, inactivated, subunit, adjuvanted, IM (Fluad 65 yrs and older) 09/13/2019    Pneumococcal Conjugate 13-valent ([id number]) 01/19/2015    Pneumococcal Conjugate Vaccine 01/10/2017    Pneumococcal Polysaccharide ([id number]) 10/12/2012    Tdap (Boostrix, Adacel) 07/17/2007    Zoster Recombinant (Shingrix) 11/21/2019 Social History:     Social History     Tobacco Use    Smoking status: Every Day     Packs/day: 1.00     Years: 55.00     Pack years: 55.00     Types: Cigarettes     Start date: 1/1/1962    Smokeless tobacco: Never    Tobacco comments:     almost ready to quit   Vaping Use    Vaping Use: Never used   Substance Use Topics    Alcohol use: No     Alcohol/week: 0.0 standard drinks    Drug use: No     Social History     Tobacco Use   Smoking Status Every Day    Packs/day: 1.00    Years: 55.00    Pack years: 55.00    Types: Cigarettes    Start date: 1/1/1962   Smokeless Tobacco Never   Tobacco Comments    almost ready to quit      Family History   Problem Relation Age of Onset    Cancer Father         COLON     Alzheimer's Disease Mother     Heart Disease Brother     Heart Failure Brother     Diabetes Daughter     Diabetes Daughter     Diabetes Daughter           REVIEW OF SYSTEMS:      Constitutional:  negative for fevers, chills, night sweats  Eyes:  negative for blurred vision, eye discharge, visual disturbance   HEENT:  negative for hearing loss, ear drainage,nasal congestion  Respiratory:  r cough++ , shortness of breath + sputum + or hemoptysis  +   Cardiovascular:  negative for chest pain, palpitations, syncope  Gastrointestinal:  negative for nausea, vomiting, diarrhea, constipation, abdominal pain  Genitourinary:  negative for frequency, dysuria, urinary incontinence, hematuria  Hematologic/Lymphatic:  negative for easy bruising, bleeding and lymphadenopathy  Allergic/Immunologic:  negative for recurrent infections, angioedema, anaphylaxis   Endocrine:  negative for weight changes, polyuria, polydipsia and polyphagia  Musculoskeletal:  negative for joint  pain, swelling, decreased range of motion  Integumentary: No rashes, skin lesions  Neurological:  negative for headaches, slurred speech, unilateral weakness  Psychiatric: negative for hallucinations,confusion,agitation.                 PHYSICAL EXAM: Vitals:    BP 96/64   Pulse (!) 117   Temp 97.6 °F (36.4 °C) (Axillary)   Resp 27   Ht 5' 4\" (1.626 m)   Wt 137 lb 12.6 oz (62.5 kg)   SpO2 100%   BMI 23.65 kg/m²     General Appearance: alert,in some  acute distress, ++  pallor, no icterus  on BIPAP ,   skin changes from smoking ++   Skin: warm and dry, no rash or erythema  Head: normocephalic and atraumatic  Eyes: pupils equal, round, and reactive to light, conjunctivae normal  ENT: tympanic membrane, external ear and ear canal normal bilaterally, nose without deformity, nasal mucosa and turbinates normal without polyps  Neck: supple and non-tender without mass, no thyromegaly  no cervical lymphadenopathy  Pulmonary/Chest:  Rt UL coarse crepts+ BRONCHIAL BREATHING + =-   wheezes, rales or rhonchi, normal air movement, in some respiratory distress  Cardiovascular: normal rate, regular rhythm, normal S1 and S2, no murmurs, rubs, clicks, or gallops, no carotid bruits  Abdomen: soft, non-tender, non-distended, normal bowel sounds, no masses or organomegaly  Extremities: no cyanosis, clubbing or edema  Musculoskeletal: normal range of motion, no joint swelling, deformity or tenderness  Integumentary: No rashes, no abnormal skin lesions, no petechiae  Neurologic: reflexes normal and symmetric, no cranial nerve deficit  Psych:  Orientation, sensorium, mood normal            Lines: PICC     Data Review:    CBC:   Lab Results   Component Value Date    WBC 6.7 07/27/2022    HGB 8.9 (L) 07/27/2022    HCT 25.3 (L) 07/27/2022    MCV 90.1 07/27/2022     (H) 07/27/2022     RENAL:   Lab Results   Component Value Date    CREATININE <0.5 (L) 07/27/2022    BUN 5 (L) 07/27/2022     07/27/2022    K 3.6 07/27/2022     07/27/2022    CO2 28 07/27/2022     SED RATE: No results found for: SEDRATE  CK: No results found for: CKTOTAL  CRP: No results found for: CRP  Hepatic Function Panel:   Lab Results   Component Value Date/Time    ALKPHOS 88 07/18/2022 05:20 PM    ALT 65 07/18/2022 05:20 PM    AST 61 07/18/2022 05:20 PM    PROT 5.6 07/18/2022 05:20 PM    PROT 6.9 10/12/2012 11:00 AM    BILITOT 0.5 07/18/2022 05:20 PM    LABALBU 2.4 07/18/2022 05:20 PM     UA:  Lab Results   Component Value Date/Time    COLORU Yellow 07/22/2022 06:31 AM    CLARITYU Clear 07/22/2022 06:31 AM    GLUCOSEU Negative 07/22/2022 06:31 AM    BILIRUBINUR Negative 07/22/2022 06:31 AM    BILIRUBINUR neg 10/17/2019 11:50 AM    KETUA TRACE 07/22/2022 06:31 AM    SPECGRAV 1.049 07/22/2022 06:31 AM    BLOODU Negative 07/22/2022 06:31 AM    PHUR 6.5 07/22/2022 06:31 AM    PROTEINU 30 07/22/2022 06:31 AM    UROBILINOGEN 0.2 07/22/2022 06:31 AM    NITRU Negative 07/22/2022 06:31 AM    LEUKOCYTESUR Negative 07/22/2022 06:31 AM    LABMICR YES 07/22/2022 06:31 AM    URINETYPE NotGiven 07/22/2022 06:31 AM      Urine Microscopic:   Lab Results   Component Value Date/Time    LABCAST 10-20 Hyaline 01/10/2017 06:19 PM    BACTERIA None Seen 07/22/2022 06:31 AM    COMU see below 07/03/2022 03:11 PM    HYALCAST 2 07/22/2022 06:31 AM    WBCUA 2 07/22/2022 06:31 AM    RBCUA 5 07/22/2022 06:31 AM    EPIU 1 07/22/2022 06:31 AM     Urine Reflex to Culture:   Lab Results   Component Value Date/Time    URRFLXCULT Not Indicated 07/03/2022 03:11 PM         MICRO: cultures reviewed and updated by me   Blood Culture:          Culture, Respiratory [[id number]] (Abnormal)  Collected: 07/22/22 1200   Order Status: Completed Specimen: Sputum Expectorated Updated: 07/24/22 0801    CULTURE, RESPIRATORY Rare growth normal respiratory fabiana with Abnormal     Gram Stain Result No Epithelial Cells seen   3+ WBC's (Polymorphonuclear)   No organisms seen     Organism Pseudomonas aeruginosa Abnormal     CULTURE, RESPIRATORY Light growth   Narrative:     ORDER#: [id number]                          ORDERED BY: CIRA KEBEDE   SOURCE: Sputum Expectorated                COLLECTED:  07/22/22 12:00   ANTIBIOTICS AT GURWINDER.: RECEIVED :  07/22/22 12:22   Respiratory Culture [[id number]] (Abnormal)  Collected: 07/19/22 2030   Order Status: Completed Specimen: Sputum Expectorated Updated: 07/23/22 0748    CULTURE, RESPIRATORY Light growth normal respiratory fabiana with Abnormal     Gram Stain Result 2+ Gram positive cocci   2+ WBC's (Polymorphonuclear)   1+ Epithelial Cells     Organism Pseudomonas aeruginosa Abnormal     CULTURE, RESPIRATORY Light growth   Narrative:     ORDER#: [id number]                          ORDERED BY: FARZANA GAN   SOURCE: Sputum Expectorated                COLLECTED:  07/19/22 20:30   ANTIBIOTICS AT GURWINDER.:                      RECEIVED :  07/19/22 21:19   Culture, Blood 1 [[id number]] Collected: 07/18/22 1841   Order Status: Completed Specimen: Blood Updated: 07/22/22 2015    Blood Culture, Routine No Growth after 4 days of incubation. Narrative:     ORDER#: [id number]                          ORDERED BY: Mark Obrien   SOURCE: Blood                              COLLECTED:  07/18/22 18:41   ANTIBIOTICS AT GURWINDER.:                      RECEIVED :  07/18/22 18:56   If child <=2 yrs old please draw pediatric bottle. ~Blood Culture 1   Culture, Blood 2 [[id number]] Collected: 07/18/22 1850   Order Status: Completed Specimen: Blood Updated: 07/22/22 2015    Culture, Blood 2 No Growth after 4 days of incubation. Narrative:     ORDER#: [id number]                          ORDERED BY: Mark Obrien   SOURCE: Blood                              COLLECTED:  07/18/22 18:50   ANTIBIOTICS AT GURWINDER.:                      RECEIVED :  07/18/22 18:56   If child <=2 yrs old please draw pediatric bottle. ~Blood Culture #2   Strep Pneumoniae Antigen [[id number]] Collected: 07/20/22 1020   Order Status: Completed Specimen: Urine, clean catch Updated: 07/21/22 0841    STREP PNEUMONIAE ANTIGEN, URINE --    Presumptive Negative   Presumptive negative suggests no current or recent   pneumococcal infection.  Infection due to Strep pneumoniae   cannot be ruled out since the antigen present in the sample   may be below the detection limit of the test.   Normal Range:Presumptive Negative    Narrative:     ORDER#: [id number]                          ORDERED BY: FARZANA GAN   SOURCE: Urine Clean Catch                  COLLECTED:  07/20/22 10:20   ANTIBIOTICS AT GURWINDER.:                      RECEIVED :  07/20/22 10:27   Legionella antigen, urine [[id number]] Collected: 07/20/22 1020   Order Status: Completed Specimen: Urine, catheter Updated: 07/21/22 0840    L. pneumophila Serogp 1 Ur Ag --    Presumptive Negative   No Legionella pneumophila serogroup 1 antigens detected. A negative result does not exclude infection with   Legionella pneumophila serogroup 1 nor does it rule out   other microbial-caused respiratory infections or   disease caused by other serogroups of   Legionella pneumophila. Normal Range: Presumptive Negative    Narrative:     ORDER#: [id number]                          ORDERED BY: FARZANA GAN   SOURCE: Urine Catheter                     COLLECTED:  07/20/22 10:20   ANTIBIOTICS AT GURWINDER.:                      RECEIVED :  07/20/22 10:28   Sputum gram stain [[id number]] Collected: 07/19/22 2030   Order Status: Canceled Specimen: Sputum Expectorated    Rapid influenza A/B antigens [[id number]] Collected: 07/19/22 0210   Order Status: Completed Specimen: Nares Updated: 07/19/22 0245    Rapid Influenza A Ag Negative    Rapid Influenza B Ag Negative   COVID-19, Rapid [[id number]] Collected: 07/18/22 1825   Order Status: Completed Specimen: Nasopharyngeal Swab Updated: 07/18/22 1854    SARS-CoV-2, NAAT Not Detected    Comment: Rapid NAAT:   Negative results should be treated as presumptive and,   if inconsistent with clinical signs and symptoms or necessary for   patient management, should be tested with an alternative molecular   assay.  Negative results do not preclude SARS-CoV-2 infection and   should not be used as the sole basis for patient management decisions. This test has been authorized by the FDA under an Emergency Use   Authorization (EUA) for use by authorized laboratories. Fact sheet for Healthcare Providers:   Maykel.es   Fact sheet for Patients: Maykel.prashanth     METHODOLOGY: Isothermal Nucleic Acid Amplification         CULTURE, RESPIRATORY Rare growth normal respiratory fabiana with Abnormal     Gram Stain Result No Epithelial Cells seen   3+ WBC's (Polymorphonuclear)   No organisms seen    Organism Pseudomonas aeruginosa Abnormal     CULTURE, RESPIRATORY Light growth    Resulting Agency [facility]          Susceptibility      Pseudomonas aeruginosa (1)    Antibiotic Interpretation Microscan  Method Status    cefepime Sensitive 8 mcg/mL BACTERIAL SUSCEPTIBILITY PANEL BY TIERRA     ciprofloxacin Resistant >2 mcg/mL BACTERIAL SUSCEPTIBILITY PANEL BY TIERRA     gentamicin Sensitive <=4 mcg/mL BACTERIAL SUSCEPTIBILITY PANEL BY TIERRA     meropenem Sensitive <=1 mcg/mL BACTERIAL SUSCEPTIBILITY PANEL BY TIERRA     piperacillin-tazobactam Sensitive <=16 mcg/mL BACTERIAL SUSCEPTIBILITY PANEL BY TIERRA     tobramycin Sensitive <=4 mcg/mL BACTERIAL SUSCEPTIBILITY PANEL BY TIERRA          Narrative  Performed by: [facility]  ORDER#: [id number]                          ORDERED BY: CIRA KEBEDE   SOURCE: Sputum Expectorated                COLLECTED:  07/22/22 12:00   ANTIBIOTICS AT GURWINDER.:                      RECEIVED :  07/22/22 12:22           Lab Results   Component Value Date/Time    BC No Growth after 4 days of incubation. 07/18/2022 06:41 PM    BLOODCULT2 No Growth after 4 days of incubation.  07/18/2022 06:50 PM       Respiratory Culture:  Lab Results   Component Value Date/Time    CULTRESP Rare growth normal respiratory fabiana with 07/22/2022 12:00 PM    CULTRESP Light growth 07/22/2022 12:00 PM    LABGRAM  07/22/2022 12:00 PM     No Epithelial Cells seen  3+ WBC's (Polymorphonuclear)  No organisms seen       AFB:No results found for: AFBSMEAR  Viral Culture:  Lab Results   Component Value Date/Time    COVID19 Not Detected 07/18/2022 06:25 PM     Urine Culture: No results for input(s): Cristina Mathews in the last 72 hours. IMAGING:    XR CHEST PORTABLE   Final Result   Stable multifocal airspace disease in the right lung, including probable   pulmonary abscess in the right upper lobe. CT CHEST WO CONTRAST   Preliminary Result   1. Mixed consolidative and ground-glass opacities as well as interstitial   thickening throughout the majority of the right lung compatible with   pneumonia. 2. There is a cavity in the right upper lobe demonstrating an air-fluid level   measuring up to 9.7 cm concerning for abscess formation. 3. Trace right pleural effusion. 4. Prominent and enlarged mediastinal lymph nodes, likely reactive. 5. Stable 6 mm left lower lobe pulmonary nodule. RECOMMENDATIONS:   Fleischner Society guidelines for follow-up and management of incidentally   detected pulmonary nodules:      Single Solid Nodule:      Nodule size less than 6 mm   In a low-risk patient, no routine follow-up. In a high-risk patient, optional CT at 12 months. Nodule size equals 6-8 mm   In a low-risk patient, CT at 6-12 months, then consider CT at 18-24 months. In a high-risk patient, CT at 6-12 months, then CT at 18-24 months. Nodule size greater than 8 mm         In a low-risk patient, consider CT at 3 months, PET/CT, or tissue sampling. In a high-risk patient, consider CT at 3 months, PET/CT, or tissue sampling. Multiple Solid Nodules:      Nodule size less than 6 mm   In a low-risk patient, no routine follow-up. In a high-risk patient, optional CT at 12 months. Nodule size equals 6-8 mm   In a low-risk patient, CT at 3-6 months, then consider CT at 18-24 months. In a high-risk patient, CT at 3-6 months, then CT at 18-24 months. Nodule size greater than 8 mm   In a low-risk patient, CT at 3-6 months, then consider CT at 18-24 months. In a high-risk patient, CT at 3-6 months, then CT at 18-24 months. - Low risk patients include individuals with minimal or absent history of   smoking and other known risk factors. - High risk patients include individuals with a history or smoking or known   risk factors. Radiology 2017 http://pubs. rsna.org/doi/full/10.1148/radiol. 1375584722         XR CHEST PORTABLE   Final Result   1. Increased pneumonia throughout the right lung remaining most prominent in   the right upper lobe. 2. Emphysema better demonstrated on CT. 3. Possible trace right pleural effusion. XR CHEST PORTABLE   Final Result   Improved aeration of the right chest with persistent consolidation in the mid   to upper right chest.           XR CHEST PORTABLE   Final Result   1. Increased pneumonia throughout the right lung remaining most prominent in   the right upper lobe. 2. Emphysema better demonstrated on CT. 3. Possible trace right pleural effusion. XR CHEST PORTABLE   Final Result   Improved aeration of the right chest with persistent consolidation in the mid   to upper right chest.                All pertinent images and reports for the current Hospitalization were reviewed by me.        Scheduled Meds:   calcium gluconate-NaCl  1,000 mg IntraVENous Once    meropenem  1,000 mg IntraVENous Q8H    tobramycin (PF)  300 mg Nebulization BID    sodium chloride  500 mL IntraVENous Once    aspirin  81 mg Oral Daily    nystatin  5 mL Oral 4x Daily    prasugrel  10 mg Oral Daily    sodium chloride flush  5-40 mL IntraVENous 2 times per day    atorvastatin  40 mg Oral Nightly    tiZANidine  4 mg Oral Nightly    pantoprazole  40 mg Oral Daily    gabapentin  600 mg Oral BID    DULoxetine  60 mg Oral BID    insulin lispro  0-12 Units SubCUTAneous TID     insulin lispro  0-6 Units SubCUTAneous Nightly mometasone-formoterol  2 puff Inhalation BID    And    tiotropium  2 puff Inhalation Daily       Continuous Infusions:   sodium chloride 5 mL/hr at 07/25/22 0502    dextrose         PRN Meds:  sodium chloride flush, sodium chloride, acetaminophen, [Held by provider] oxyCODONE, [Held by provider] HYDROcodone 5 mg - acetaminophen, perflutren lipid microspheres, traZODone, ipratropium-albuterol, cetirizine, albuterol sulfate HFA, glucose, dextrose bolus **OR** dextrose bolus, glucagon (rDNA), dextrose, ondansetron, polyethylene glycol      Assessment:     Patient Active Problem List   Diagnosis    Osteopenia-last dexa 2009 repeat 2015 (-2.4 hip)--advised tx    Colon polyp, hyperplastic,-(done 3/11-repeat 3-5 yrs--dr Abdelrahman Al)    Family history of colon cancer    CTS (carpal tunnel syndrome)BILATERAL-s/p surgical repair--resolved     Postmenopausal status-last mammogram 2/15 wnl last pap 12/13--wnl    Nondependent alcohol abuse, in remission    Urticaria, chronic    Smoking    Chronic back pain-(djd) saw dr Maureen Elmore afford surgery--seeing dr Laura Pena for pain meds    Fibromyalgia    Hypercholesteremia    Lumbar spondylosis    Vitamin D deficiency--severe--started 50,000 iu 2x/ wk 11/13    Insomnia--on elevil w help    Constipation--on daily miralax    Depression    Chronic pain syndrome    Muscle cramping    Acute on chronic respiratory failure with hypercapnia (HCC)    ROLY (acute kidney injury) ([address]    Severe sepsis (HCC)    Gastroesophageal reflux disease    COPD, severe ([address]    Chronic hypoxemic respiratory failure ([address]    Degeneration of lumbar or lumbosacral intervertebral disc    Trochanteric bursitis of right hip    COPD exacerbation ([address]    Diabetes ([address]    Controlled type 2 diabetes mellitus without complication, without long-term current use of insulin ([address]    Age-related osteoporosis without current pathological fracture- started alendronate 9/2021    Pulmonary nodule seen on imaging study Pneumonia of right lung due to infectious organism    General weakness    Elevated lactic acid level    Community acquired pneumonia of right lung    Chronic obstructive pulmonary disease (HCC)    Acute respiratory failure with hypoxia (HCC)    Acute on chronic respiratory failure with hypoxia and hypercapnia (HCC)    Abnormal EKG    NSTEMI (non-ST elevated myocardial infarction) (HCC)    Necrotizing pneumonia (HCC)    Abscess of upper lobe of right lung with pneumonia (HCC)    Pseudomonas respiratory infection    Abnormal CT of the chest     Severe necrotizing Pseudomonas pneumonia  Right upper lobe cavitary lesion  Right right lung evolving abscess  Right lung dense consolidation of  Pseudomonas pneumonia developing some resistance  COPD exacerbation  Hypoxic respiratory failure  Coronary artery disease  Status post cardiac cath  Chronic smoking  Abnormal CT chest  BNP elevation  COVID-19 negative        Unfortunately she developed progressive changes with dense consolidation and lung abscess secondary to Pseudomonas pneumonia. Pseudomonas appears to have developed resistance to quinolones while on therapy this is concerning. CT chest on this admission grossly abnormal and definitely there is progressive changes given the severity of the -pneumonia will need IV antibiotics     OPAT d/w pt she needs to QUIT smoking d/w pt and her family     Not doing well today unfortunately still has Severe hypoxia and respiratory distress required transfer to ICU now on BiPAP.   Heart rate is elevated secondary to respiratory distress-    Labs, Microbiology, Radiology and all the pertinent results from current hospitalization and  care every where were reviewed  by me as a part of the evaluation   Plan:   IV Meropenem 1 gm q 8 HR X 21 DAYS  Inh Tobramycin Neb Q 12 hRS  Picc line placed   OPAT d/w pt  QUIT smoking  Will repeat CT chest in  3 weeks  Cont supportive care  CT images reviewed see above    Risk for progression and intubation high   Now on BIPAP for resp failure and worsening resp status  d/w ICU team         Discussed with patient/Family and Nursing   Risk of Complications/Morbidity: High      Illness(es)/ Infection present that pose threat to bodily function. There is potential for severe exacerbation of infection/side effects of treatment. Therapy requires intensive monitoring for antimicrobial agent toxicity. Discussed with patient/Family and Nursing staff     Thanks for allowing me to participate in your patient's care and please call me with any questions or concerns.     Boo Panchal MD  Infectious Disease  [facility]) Physician  Phone: [phone]   Fax : [phone]

## 2022-07-27 NOTE — PLAN OF CARE
Problem: Discharge Planning  Goal: Discharge to home or other facility with appropriate resources  7/26/2022 2058 by Kinga De La Fuente RN  Outcome: Progressing Towards Goal  Flowsheets (Taken 7/26/2022 1950)  Discharge to home or other facility with appropriate resources: Refer to discharge planning if patient needs post-hospital services based on physician order or complex needs related to functional status, cognitive ability or social support system  7/26/2022 0803 by Dustin Bee RN  Outcome: Progressing Towards Goal  Flowsheets (Taken 7/26/2022 0800)  Discharge to home or other facility with appropriate resources: Identify barriers to discharge with patient and caregiver     Problem: Pain  Goal: Verbalizes/displays adequate comfort level or baseline comfort level  7/26/2022 2058 by Kinga De La Fuente RN  Outcome: Progressing Towards Goal  7/26/2022 0803 by Dustin Bee RN  Outcome: Progressing Towards Goal     Problem: Confusion  Goal: Confusion, delirium, dementia, or psychosis is improved or at baseline  Description: INTERVENTIONS:  1. Assess for possible contributors to thought disturbance, including medications, impaired vision or hearing, underlying metabolic abnormalities, dehydration, psychiatric diagnoses, and notify attending LIP  2. [place] high risk fall precautions, as indicated  3. Provide frequent short contacts to provide reality reorientation, refocusing and direction  4. Decrease environmental stimuli, including noise as appropriate  5. Monitor and intervene to maintain adequate nutrition, hydration, elimination, sleep and activity  6. If unable to ensure safety without constant attention obtain sitter and review sitter guidelines with assigned personnel  7.  Initiate Psychosocial CNS and Spiritual Care consult, as indicated  7/26/2022 2058 by Kinga De La Fuente RN  Outcome: Progressing Towards Goal  7/26/2022 0803 by Dustin Bee RN  Outcome: Progressing Towards Goal     Problem: Skin/Tissue Integrity  Goal: Absence of new skin breakdown  Description: 1. Monitor for areas of redness and/or skin breakdown  2. Assess vascular access sites hourly  3. Every 4-6 hours minimum:  Change oxygen saturation probe site  4. Every 4-6 hours:  If on nasal continuous positive airway pressure, respiratory therapy assess nares and determine need for appliance change or resting period.   7/26/2022 2058 by Charmaine Ash RN  Outcome: Progressing Towards Goal  7/26/2022 0803 by Anthony Deleon RN  Outcome: Progressing Towards Goal     Problem: Safety - Adult  Goal: Free from fall injury  7/26/2022 2058 by Charmaine Ash RN  Outcome: Progressing Towards Goal  7/26/2022 0803 by Anthony Deleon RN  Outcome: Progressing Towards Goal     Problem: ABCDS Injury Assessment  Goal: Absence of physical injury  7/26/2022 2058 by Charmaine Ash RN  Outcome: Progressing Towards Goal  7/26/2022 0803 by Anthony Deleon RN  Outcome: Progressing Towards Goal     Problem: Chronic Conditions and Co-morbidities  Goal: Patient's chronic conditions and co-morbidity symptoms are monitored and maintained or improved  7/26/2022 2058 by Charmaine Ash RN  Outcome: Progressing Towards Goal  7/26/2022 0803 by Anthony Deleon RN  Outcome: Progressing Towards Goal  Flowsheets (Taken 7/26/2022 0800)  Care Plan - Patient's Chronic Conditions and Co-Morbidity Symptoms are Monitored and Maintained or Improved: Monitor and assess patient's chronic conditions and comorbid symptoms for stability, deterioration, or improvement     Problem: Nutrition Deficit:  Goal: Optimize nutritional status  7/26/2022 2058 by Charmaine Ash RN  Outcome: Progressing Towards Goal  Flowsheets (Taken 7/26/2022 1248 by Yany Casas RD, LD)  Nutrient intake appropriate for improving, restoring, or maintaining nutritional needs:   Assess nutritional status and recommend course of action   Monitor oral intake, labs, and treatment plans   Recommend appropriate diets, oral nutritional supplements, and vitamin/mineral supplements  7/26/2022 0803 by Dustin Bee RN  Outcome: Progressing Towards Goal

## 2022-07-27 NOTE — PROGRESS NOTES
Hospitalist Progress Note      PCP: Brynn Shaw, APRN - CNP    Date of Admission: 7/18/2022    Chief Complaint: respiratory distress    Hospital Course:      Subjective: worsening dypnea this morning and transferred to ICU, placed on Bipap       Medications:  Reviewed    Infusion Medications    sodium chloride 5 mL/hr at 07/25/22 0502    dextrose       Scheduled Medications    meropenem  1,000 mg IntraVENous Q8H    tobramycin (PF)  300 mg Nebulization BID    sodium chloride  500 mL IntraVENous Once    aspirin  81 mg Oral Daily    nystatin  5 mL Oral 4x Daily    prasugrel  10 mg Oral Daily    sodium chloride flush  5-40 mL IntraVENous 2 times per day    atorvastatin  40 mg Oral Nightly    tiZANidine  4 mg Oral Nightly    pantoprazole  40 mg Oral Daily    gabapentin  600 mg Oral BID    DULoxetine  60 mg Oral BID    insulin lispro  0-12 Units SubCUTAneous TID WC    insulin lispro  0-6 Units SubCUTAneous Nightly    mometasone-formoterol  2 puff Inhalation BID    And    tiotropium  2 puff Inhalation Daily     PRN Meds: sodium chloride flush, sodium chloride, acetaminophen, [Held by provider] oxyCODONE, [Held by provider] HYDROcodone 5 mg - acetaminophen, perflutren lipid microspheres, traZODone, ipratropium-albuterol, cetirizine, albuterol sulfate HFA, glucose, dextrose bolus **OR** dextrose bolus, glucagon (rDNA), dextrose, ondansetron, polyethylene glycol      Intake/Output Summary (Last 24 hours) at 7/27/2022 1642  Last data filed at 7/27/2022 1400  Gross per 24 hour   Intake 240 ml   Output 2750 ml   Net -2510 ml       Exam:    BP (!) 118/92   Pulse (!) 104   Temp 97.8 °F (36.6 °C) (Axillary)   Resp 27   Ht 5' 4\" (1.626 m)   Wt 137 lb 12.6 oz (62.5 kg)   SpO2 97%   BMI 23.65 kg/m²     General appearance: Breathing comfortably on Bipap appears stated age and cooperative. HEENT: Pupils equal, round, and reactive to light. Conjunctivae/corneas clear. Neck: Supple, with full range of motion. No jugular venous distention. Trachea midline. Respiratory:  Comfortably on Bipap. Clear to auscultation, bilaterally without Rales/Wheezes/Rhonchi. Cardiovascular: Regular rate and rhythm with normal S1/S2 without murmurs, rubs or gallops. Abdomen: Soft, non-tender, non-distended with normal bowel sounds. Musculoskeletal: No clubbing, cyanosis or edema bilaterally. Full range of motion without deformity. Skin: Skin color, texture, turgor normal.  No rashes or lesions. Neurologic:  Neurovascularly intact without any focal sensory/motor deficits. Cranial nerves: II-XII intact, grossly non-focal.  Psychiatric: Alert and oriented, thought content appropriate, normal insight  Capillary Refill: Brisk,< 3 seconds   Peripheral Pulses: +2 palpable, equal bilaterally       Labs:   Recent Labs     07/25/22 0853 07/26/22 0447 07/27/22  0358   WBC 5.5 6.3 6.7   HGB 8.4* 8.3* 8.9*   HCT 24.7* 24.2* 25.3*    429 477*     Recent Labs     07/25/22 0853 07/26/22 0447 07/27/22  0358    137 138   K 3.7 3.5 3.6    103 102   CO2 24 26 28   BUN 9 6* 5*   CREATININE <0.5* <0.5* <0.5*   CALCIUM 7.5* 7.9* 7.8*   PHOS  --   --  3.1     No results for input(s): AST, ALT, BILIDIR, BILITOT, ALKPHOS in the last 72 hours. No results for input(s): INR in the last 72 hours. No results for input(s): Jazmyn Comber in the last 72 hours. Urinalysis:      Lab Results   Component Value Date/Time    NITRU Negative 07/22/2022 06:31 AM    WBCUA 2 07/22/2022 06:31 AM    BACTERIA None Seen 07/22/2022 06:31 AM    RBCUA 5 07/22/2022 06:31 AM    BLOODU Negative 07/22/2022 06:31 AM    SPECGRAV 1.049 07/22/2022 06:31 AM    GLUCOSEU Negative 07/22/2022 06:31 AM       Radiology:  XR CHEST PORTABLE   Final Result   Stable multifocal airspace disease in the right lung, including probable   pulmonary abscess in the right upper lobe. CT CHEST WO CONTRAST   Preliminary Result   1.  Mixed consolidative and ground-glass pneumonia throughout the right lung remaining most prominent in   the right upper lobe. 2. Emphysema better demonstrated on CT. 3. Possible trace right pleural effusion. XR CHEST PORTABLE   Final Result   Improved aeration of the right chest with persistent consolidation in the mid   to upper right chest.                 Assessment/Plan:    Active Hospital Problems    Diagnosis Date Noted    NSTEMI (non-ST elevated myocardial infarction) ([email or web address] [I21.4] 07/26/2022     Priority: Medium    Necrotizing pneumonia ([facility] [J85.0] 07/26/2022     Priority: Medium    Abscess of upper lobe of right lung with pneumonia ([facility] [J85.1] 07/26/2022     Priority: Medium    Pseudomonas respiratory infection [J98.8, B96.5] 07/26/2022     Priority: Medium    Abnormal CT of the chest [R93.89] 07/26/2022     Priority: Medium    Acute on chronic respiratory failure with hypoxia and hypercapnia (HCC) [J96.21, J96.22] 07/18/2022     Priority: Medium    Abnormal EKG [R94.31] 07/18/2022     Priority: Medium    Chronic obstructive pulmonary disease ([facility] [J44.9]      Priority: Medium    Pneumonia of right lung due to infectious organism [J18.9] 07/03/2022     Priority: Medium    COPD, severe ([facility] [J44.9] 02/24/2017    Chronic pain syndrome [G89.4] 03/14/2016    Hypercholesteremia [E78.00] 11/04/2013    Fibromyalgia [M79.7]     Smoking [F17.200] 08/27/2011     Acute on chronic respiratory failure with hypoxia and hypercapnia (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  Will require proloniged abx for abscess and close follow up visits from NH to doctors offices  Pulmonary and ID following  CT surgery consulted:  would need pneumonectomy for which she is a poor candidate --> medical therapy only     Critical coronary artery disease single-vessel  Status post PCI to left circumflex by Dr Garland Cuellar on this admission  with stent  Now on antiplatelet therapy     Anemia  No evidence of bleeding  Suspect anemia of chronic disease  Will check iron studies TIBC ferritin % and reticulocyte count  No indication to transfuse at this point  ? Benefit to EPO     COPD, severe ([address] - without acute exacerbation. Will provide Nebulizer treatments as needed and continue home medications. Patient will be monitored closely, and deep breathing and coughing will be encouraged while awake. Hyperlipidemia - No current evidence of Rhabdomyolysis or other adverse effects. Continue statin therapy while in the hospital     Chronic pain syndrome -continue her home pain med regimen     Fibromyalgia - provide supportive care    DVT Prophylaxis: heparin  Diet: ADULT DIET; Regular;  Low Sodium (2 gm)  ADULT ORAL NUTRITION SUPPLEMENT; Breakfast, Lunch, Dinner; Standard High Calorie/High Protein Oral Supplement  Code Status: Full Code    PT/OT Eval Status: evaluating    Dispo - ICU, guarded prognosis    Gerard Pretty MD

## 2022-07-28 LAB
ANION GAP SERPL CALCULATED.3IONS-SCNC: 5 MMOL/L (ref 3–16)
BASOPHILS ABSOLUTE: 0 K/UL (ref 0–0.2)
BASOPHILS RELATIVE PERCENT: 0.1 %
BUN BLDV-MCNC: 9 MG/DL (ref 7–20)
CALCIUM SERPL-MCNC: 7.9 MG/DL (ref 8.3–10.6)
CHLORIDE BLD-SCNC: 99 MMOL/L (ref 99–110)
CO2: 32 MMOL/L (ref 21–32)
CREAT SERPL-MCNC: <0.5 MG/DL (ref 0.6–1.2)
EOSINOPHILS ABSOLUTE: 0 K/UL (ref 0–0.6)
EOSINOPHILS RELATIVE PERCENT: 0.1 %
GFR AFRICAN AMERICAN: >60
GFR NON-AFRICAN AMERICAN: >60
GLUCOSE BLD-MCNC: 123 MG/DL (ref 70–99)
GLUCOSE BLD-MCNC: 127 MG/DL (ref 70–99)
GLUCOSE BLD-MCNC: 135 MG/DL (ref 70–99)
GLUCOSE BLD-MCNC: 136 MG/DL (ref 70–99)
GLUCOSE BLD-MCNC: 148 MG/DL (ref 70–99)
HCT VFR BLD CALC: 24 % (ref 36–48)
HEMOGLOBIN: 8.3 G/DL (ref 12–16)
LYMPHOCYTES ABSOLUTE: 1 K/UL (ref 1–5.1)
LYMPHOCYTES RELATIVE PERCENT: 10.8 %
MAGNESIUM: 1.8 MG/DL (ref 1.8–2.4)
MCH RBC QN AUTO: 31.2 PG (ref 26–34)
MCHC RBC AUTO-ENTMCNC: 34.4 G/DL (ref 31–36)
MCV RBC AUTO: 90.8 FL (ref 80–100)
MONOCYTES ABSOLUTE: 0.5 K/UL (ref 0–1.3)
MONOCYTES RELATIVE PERCENT: 5.7 %
NEUTROPHILS ABSOLUTE: 7.9 K/UL (ref 1.7–7.7)
NEUTROPHILS RELATIVE PERCENT: 83.3 %
PDW BLD-RTO: 14.1 % (ref 12.4–15.4)
PERFORMED ON: ABNORMAL
PHOSPHORUS: 3.1 MG/DL (ref 2.5–4.9)
PLATELET # BLD: 527 K/UL (ref 135–450)
PMV BLD AUTO: 7 FL (ref 5–10.5)
POTASSIUM REFLEX MAGNESIUM: 3.7 MMOL/L (ref 3.5–5.1)
PROCALCITONIN: 0.44 NG/ML (ref 0–0.15)
RBC # BLD: 2.64 M/UL (ref 4–5.2)
SODIUM BLD-SCNC: 136 MMOL/L (ref 136–145)
WBC # BLD: 9.4 K/UL (ref 4–11)

## 2022-07-28 PROCEDURE — 83735 ASSAY OF MAGNESIUM: CPT

## 2022-07-28 PROCEDURE — 6370000000 HC RX 637 (ALT 250 FOR IP): Performed by: NURSE PRACTITIONER

## 2022-07-28 PROCEDURE — 94761 N-INVAS EAR/PLS OXIMETRY MLT: CPT

## 2022-07-28 PROCEDURE — 6370000000 HC RX 637 (ALT 250 FOR IP): Performed by: INTERNAL MEDICINE

## 2022-07-28 PROCEDURE — 6360000002 HC RX W HCPCS: Performed by: INTERNAL MEDICINE

## 2022-07-28 PROCEDURE — 2060000000 HC ICU INTERMEDIATE R&B

## 2022-07-28 PROCEDURE — 2500000003 HC RX 250 WO HCPCS: Performed by: NURSE PRACTITIONER

## 2022-07-28 PROCEDURE — 99233 SBSQ HOSP IP/OBS HIGH 50: CPT | Performed by: INTERNAL MEDICINE

## 2022-07-28 PROCEDURE — 85025 COMPLETE CBC W/AUTO DIFF WBC: CPT

## 2022-07-28 PROCEDURE — 2700000000 HC OXYGEN THERAPY PER DAY

## 2022-07-28 PROCEDURE — 6360000002 HC RX W HCPCS: Performed by: NURSE PRACTITIONER

## 2022-07-28 PROCEDURE — 2580000003 HC RX 258: Performed by: INTERNAL MEDICINE

## 2022-07-28 PROCEDURE — 36415 COLL VENOUS BLD VENIPUNCTURE: CPT

## 2022-07-28 PROCEDURE — 80048 BASIC METABOLIC PNL TOTAL CA: CPT

## 2022-07-28 PROCEDURE — 84100 ASSAY OF PHOSPHORUS: CPT

## 2022-07-28 PROCEDURE — 94669 MECHANICAL CHEST WALL OSCILL: CPT

## 2022-07-28 PROCEDURE — 84145 PROCALCITONIN (PCT): CPT

## 2022-07-28 PROCEDURE — 94640 AIRWAY INHALATION TREATMENT: CPT

## 2022-07-28 RX ORDER — MAGNESIUM SULFATE IN WATER 40 MG/ML
2000 INJECTION, SOLUTION INTRAVENOUS ONCE
Status: COMPLETED | OUTPATIENT
Start: 2022-07-28 | End: 2022-07-28

## 2022-07-28 RX ORDER — CALCIUM GLUCONATE 20 MG/ML
1000 INJECTION, SOLUTION INTRAVENOUS ONCE
Status: COMPLETED | OUTPATIENT
Start: 2022-07-28 | End: 2022-07-28

## 2022-07-28 RX ORDER — HYDROCODONE BITARTRATE AND ACETAMINOPHEN 5; 325 MG/1; MG/1
1 TABLET ORAL EVERY 6 HOURS PRN
Status: DISCONTINUED | OUTPATIENT
Start: 2022-07-28 | End: 2022-08-03

## 2022-07-28 RX ORDER — ENOXAPARIN SODIUM 100 MG/ML
40 INJECTION SUBCUTANEOUS DAILY
Status: DISCONTINUED | OUTPATIENT
Start: 2022-07-28 | End: 2022-08-16 | Stop reason: HOSPADM

## 2022-07-28 RX ORDER — POTASSIUM CHLORIDE 29.8 MG/ML
20 INJECTION INTRAVENOUS ONCE
Status: COMPLETED | OUTPATIENT
Start: 2022-07-28 | End: 2022-07-28

## 2022-07-28 RX ADMIN — POTASSIUM CHLORIDE 20 MEQ: 29.8 INJECTION, SOLUTION INTRAVENOUS at 11:15

## 2022-07-28 RX ADMIN — GABAPENTIN 600 MG: 300 CAPSULE ORAL at 08:50

## 2022-07-28 RX ADMIN — ASPIRIN 81 MG: 81 TABLET, COATED ORAL at 08:50

## 2022-07-28 RX ADMIN — TIZANIDINE 4 MG: 4 TABLET ORAL at 21:15

## 2022-07-28 RX ADMIN — NYSTATIN 500000 UNITS: 100000 SUSPENSION ORAL at 08:50

## 2022-07-28 RX ADMIN — NYSTATIN 500000 UNITS: 100000 SUSPENSION ORAL at 13:00

## 2022-07-28 RX ADMIN — MEROPENEM 1000 MG: 1 INJECTION, POWDER, FOR SOLUTION INTRAVENOUS at 14:06

## 2022-07-28 RX ADMIN — PRASUGREL 10 MG: 10 TABLET, FILM COATED ORAL at 08:53

## 2022-07-28 RX ADMIN — TOBRAMYCIN 300 MG: 300 SOLUTION RESPIRATORY (INHALATION) at 07:58

## 2022-07-28 RX ADMIN — CALCIUM GLUCONATE 1000 MG: 20 INJECTION, SOLUTION INTRAVENOUS at 10:41

## 2022-07-28 RX ADMIN — MAGNESIUM SULFATE HEPTAHYDRATE 2000 MG: 40 INJECTION, SOLUTION INTRAVENOUS at 09:00

## 2022-07-28 RX ADMIN — ATORVASTATIN CALCIUM 40 MG: 40 TABLET, FILM COATED ORAL at 21:15

## 2022-07-28 RX ADMIN — DULOXETINE HYDROCHLORIDE 60 MG: 60 CAPSULE, DELAYED RELEASE ORAL at 21:15

## 2022-07-28 RX ADMIN — HYDROCODONE BITARTRATE AND ACETAMINOPHEN 1 TABLET: 5; 325 TABLET ORAL at 21:15

## 2022-07-28 RX ADMIN — NYSTATIN 500000 UNITS: 100000 SUSPENSION ORAL at 18:44

## 2022-07-28 RX ADMIN — DULOXETINE HYDROCHLORIDE 60 MG: 60 CAPSULE, DELAYED RELEASE ORAL at 08:50

## 2022-07-28 RX ADMIN — MOMETASONE FUROATE AND FORMOTEROL FUMARATE DIHYDRATE 2 PUFF: 200; 5 AEROSOL RESPIRATORY (INHALATION) at 20:11

## 2022-07-28 RX ADMIN — SODIUM CHLORIDE, PRESERVATIVE FREE 10 ML: 5 INJECTION INTRAVENOUS at 08:51

## 2022-07-28 RX ADMIN — MEROPENEM 1000 MG: 1 INJECTION, POWDER, FOR SOLUTION INTRAVENOUS at 21:20

## 2022-07-28 RX ADMIN — ENOXAPARIN SODIUM 40 MG: 100 INJECTION SUBCUTANEOUS at 08:50

## 2022-07-28 RX ADMIN — TIOTROPIUM BROMIDE INHALATION SPRAY 2 PUFF: 3.12 SPRAY, METERED RESPIRATORY (INHALATION) at 07:55

## 2022-07-28 RX ADMIN — MOMETASONE FUROATE AND FORMOTEROL FUMARATE DIHYDRATE 2 PUFF: 200; 5 AEROSOL RESPIRATORY (INHALATION) at 07:55

## 2022-07-28 RX ADMIN — MEROPENEM 1000 MG: 1 INJECTION, POWDER, FOR SOLUTION INTRAVENOUS at 06:32

## 2022-07-28 RX ADMIN — HYDROCODONE BITARTRATE AND ACETAMINOPHEN 1 TABLET: 5; 325 TABLET ORAL at 08:54

## 2022-07-28 RX ADMIN — HYDROCODONE BITARTRATE AND ACETAMINOPHEN 1 TABLET: 5; 325 TABLET ORAL at 15:15

## 2022-07-28 RX ADMIN — GABAPENTIN 600 MG: 300 CAPSULE ORAL at 21:14

## 2022-07-28 RX ADMIN — PANTOPRAZOLE SODIUM 40 MG: 40 TABLET, DELAYED RELEASE ORAL at 08:50

## 2022-07-28 RX ADMIN — NYSTATIN 500000 UNITS: 100000 SUSPENSION ORAL at 21:15

## 2022-07-28 ASSESSMENT — PAIN DESCRIPTION - ORIENTATION
ORIENTATION: LOWER

## 2022-07-28 ASSESSMENT — PAIN DESCRIPTION - LOCATION
LOCATION: BACK

## 2022-07-28 ASSESSMENT — PAIN DESCRIPTION - PAIN TYPE
TYPE: CHRONIC PAIN

## 2022-07-28 ASSESSMENT — PAIN DESCRIPTION - DESCRIPTORS
DESCRIPTORS: ACHING
DESCRIPTORS: DISCOMFORT;ACHING

## 2022-07-28 ASSESSMENT — PAIN SCALES - WONG BAKER: WONGBAKER_NUMERICALRESPONSE: 6

## 2022-07-28 ASSESSMENT — PAIN SCALES - GENERAL
PAINLEVEL_OUTOF10: 0
PAINLEVEL_OUTOF10: 10
PAINLEVEL_OUTOF10: 10
PAINLEVEL_OUTOF10: 0
PAINLEVEL_OUTOF10: 7
PAINLEVEL_OUTOF10: 10

## 2022-07-28 ASSESSMENT — PAIN DESCRIPTION - FREQUENCY
FREQUENCY: CONTINUOUS

## 2022-07-28 ASSESSMENT — PAIN DESCRIPTION - ONSET
ONSET: ON-GOING

## 2022-07-28 NOTE — PROGRESS NOTES
Patient arrived from ICU via wheelchair. 02 and heart monitor placed. On tele. VSS. Patient oriented to unit and room. Admit assessment complete and history obtained. Orders reviewed with patient and POC discussed. Call light in reach. Bed in lowest position, bed alarm on. Denies other needs. Family at bedside. Will continue to monitor.

## 2022-07-28 NOTE — CARE COORDINATION
Palliative care note      Aware of consult. Patient on bipap, just transferred to ICU. Dr Paul Underwood has spoken to patient at length. Palliative care will follow up with goals of care note.      Electronically signed by Sumaya ORTIZ, RN, [facility] on 7/28/2022 at 4:44 PM  Palliative Care Nurse  Phone [phone]

## 2022-07-28 NOTE — PROGRESS NOTES
Daily    prasugrel  10 mg Oral Daily    sodium chloride flush  5-40 mL IntraVENous 2 times per day    atorvastatin  40 mg Oral Nightly    tiZANidine  4 mg Oral Nightly    pantoprazole  40 mg Oral Daily    gabapentin  600 mg Oral BID    DULoxetine  60 mg Oral BID    insulin lispro  0-12 Units SubCUTAneous TID WC    insulin lispro  0-6 Units SubCUTAneous Nightly    mometasone-formoterol  2 puff Inhalation BID    And    tiotropium  2 puff Inhalation Daily       Continuous Infusions:   sodium chloride 5 mL/hr at 07/25/22 0502    dextrose         PRN Meds:  sodium chloride flush, sodium chloride, acetaminophen, [Held by provider] oxyCODONE, [Held by provider] HYDROcodone 5 mg - acetaminophen, perflutren lipid microspheres, traZODone, ipratropium-albuterol, cetirizine, albuterol sulfate HFA, glucose, dextrose bolus **OR** dextrose bolus, glucagon (rDNA), dextrose, ondansetron, polyethylene glycol    Labs reviewed:  CBC:   Recent Labs     07/26/22 0447 07/27/22  0358 07/28/22  0500   WBC 6.3 6.7 9.4   HGB 8.3* 8.9* 8.3*   HCT 24.2* 25.3* 24.0*   MCV 91.6 90.1 90.8    477* 527*       BMP:   Recent Labs     07/26/22 0447 07/27/22  0358 07/28/22  0500    138 136   K 3.5 3.6 3.7    102 99   CO2 26 28 32   PHOS  --  3.1 3.1   BUN 6* 5* 9   CREATININE <0.5* <0.5* <0.5*       LIVER PROFILE: No results for input(s): AST, ALT, LIPASE, BILIDIR, BILITOT, ALKPHOS in the last 72 hours. Invalid input(s): AMYLASE,  ALB  PT/INR: No results for input(s): PROTIME, INR in the last 72 hours. APTT: No results for input(s): APTT in the last 72 hours. UA:No results for input(s): NITRITE, COLORU, PHUR, LABCAST, WBCUA, RBCUA, MUCUS, TRICHOMONAS, YEAST, BACTERIA, CLARITYU, SPECGRAV, LEUKOCYTESUR, UROBILINOGEN, BILIRUBINUR, BLOODU, GLUCOSEU, AMORPHOUS in the last 72 hours. Invalid input(s): Benjamin Aguilar  No results for input(s): PH, PCO2, PO2 in the last 72 hours. Cx:      Films:              This note was transcribed using [id number] [facility]. Please disregard any translational errors.       [phone]

## 2022-07-28 NOTE — PLAN OF CARE
Problem: Discharge Planning  Goal: Discharge to home or other facility with appropriate resources  Outcome: Progressing  Flowsheets (Taken 7/28/2022 1330)  Discharge to home or other facility with appropriate resources: Identify barriers to discharge with patient and caregiver     Problem: Pain  Goal: Verbalizes/displays adequate comfort level or baseline comfort level  Outcome: Progressing  Flowsheets (Taken 7/28/2022 0800 by Mariana Muller RN)  Verbalizes/displays adequate comfort level or baseline comfort level:   Encourage patient to monitor pain and request assistance   Assess pain using appropriate pain scale     Problem: Confusion  Goal: Confusion, delirium, dementia, or psychosis is improved or at baseline  Description: INTERVENTIONS:  1. Assess for possible contributors to thought disturbance, including medications, impaired vision or hearing, underlying metabolic abnormalities, dehydration, psychiatric diagnoses, and notify attending LIP  2. [place] high risk fall precautions, as indicated  3. Provide frequent short contacts to provide reality reorientation, refocusing and direction  4. Decrease environmental stimuli, including noise as appropriate  5. Monitor and intervene to maintain adequate nutrition, hydration, elimination, sleep and activity  6. If unable to ensure safety without constant attention obtain sitter and review sitter guidelines with assigned personnel  7.  Initiate Psychosocial CNS and Spiritual Care consult, as indicated  Outcome: Progressing  Flowsheets (Taken 7/28/2022 1330)  Effect of thought disturbance (confusion, delirium, dementia, or psychosis) are managed with adequate functional status:   Assess for contributors to thought disturbance, including medications, impaired vision or hearing, underlying metabolic abnormalities, dehydration, psychiatric diagnoses, notify LIP   Provide frequent short contacts to provide reality reorientation, refocusing and direction     Problem: Skin/Tissue Integrity  Goal: Absence of new skin breakdown  Description: 1. Monitor for areas of redness and/or skin breakdown  2. Assess vascular access sites hourly  3. Every 4-6 hours minimum:  Change oxygen saturation probe site  4. Every 4-6 hours:  If on nasal continuous positive airway pressure, respiratory therapy assess nares and determine need for appliance change or resting period.   Outcome: Progressing     Problem: Safety - Adult  Goal: Free from fall injury  Outcome: Progressing     Problem: ABCDS Injury Assessment  Goal: Absence of physical injury  Outcome: Progressing     Problem: Chronic Conditions and Co-morbidities  Goal: Patient's chronic conditions and co-morbidity symptoms are monitored and maintained or improved  Outcome: Progressing  Flowsheets (Taken 7/28/2022 1330)  Care Plan - Patient's Chronic Conditions and Co-Morbidity Symptoms are Monitored and Maintained or Improved: Monitor and assess patient's chronic conditions and comorbid symptoms for stability, deterioration, or improvement     Problem: Nutrition Deficit:  Goal: Optimize nutritional status  Outcome: Progressing

## 2022-07-28 NOTE — PROGRESS NOTES
Lumbar spondylosis     New onset type 2 diabetes mellitus ([facility] [address] 02/03/2020    On home O2     4L    Pneumonia 07/2022    P.aer    Urticaria, chronic        Past Surgical History:    Past Surgical History:   Procedure Laterality Date    CARPAL TUNNEL RELEASE Bilateral     CATARACT EXTRACTION      CERVICAL POLYP REMOVAL  09/2004    CORONARY ANGIOPLASTY WITH STENT PLACEMENT  07/19/2022    LCx 99. PCI/stent. INTRACAPSULAR CATARACT EXTRACTION Left 06/23/2020    Phacoemulsification with intraocular lens implant performed by Jacquie Lentz MD at [address] Right 07/14/2020    Phacoemulsification with intraocular lens implant performed by Jacquie Lentz MD at [address]      patient denies        Current Medications:    No outpatient medications have been marked as taking for the 7/18/22 encounter [facility] Encounter). Allergies:  Patient has no known allergies.     Immunizations :   Immunization History   Administered Date(s) Administered    COVID-19, MODERNA BLUE border, Primary or Immunocompromised, (age 12y+), IM, 100 mcg/0.5mL 03/08/2021, 04/05/2021    COVID-19, MODERNA Booster BLUE border, (age 18y+), IM, 50mcg/0.25mL 12/01/2021    Influenza Vaccine, unspecified formulation 01/10/2017    Influenza, High Dose (Fluzone 65 yrs and older) 11/04/2013, 01/21/2016, 09/01/2017, 10/30/2018, 09/18/2020    Influenza, Quadv, adjuvanted, 65 yrs +, IM, PF (Fluad) 09/27/2021    Influenza, Triv, inactivated, subunit, adjuvanted, IM (Fluad 65 yrs and older) 09/13/2019    Pneumococcal Conjugate 13-valent (Nstnamt98) 01/19/2015    Pneumococcal Conjugate Vaccine 01/10/2017    Pneumococcal Polysaccharide (Npsasvelk96) 10/12/2012    Tdap (Boostrix, Adacel) 07/17/2007    Zoster Recombinant (Shingrix) 11/21/2019       Social History:     Social History     Tobacco Use    Smoking status: Every Day     Packs/day: 1.00     Years: oz (62.5 kg)   SpO2 99%   BMI 23.65 kg/m²     General Appearance: alert,in some  acute distress, ++  pallor, no icterus  on BIPAP ,   skin changes from smoking ++   Skin: warm and dry, no rash or erythema  Head: normocephalic and atraumatic  Eyes: pupils equal, round, and reactive to light, conjunctivae normal  ENT: tympanic membrane, external ear and ear canal normal bilaterally, nose without deformity, nasal mucosa and turbinates normal without polyps  Neck: supple and non-tender without mass, no thyromegaly  no cervical lymphadenopathy  Pulmonary/Chest:  Rt UL coarse crepts+ BRONCHIAL BREATHING + =-   wheezes, rales or rhonchi, normal air movement, in some respiratory distress  Cardiovascular: normal rate, regular rhythm, normal S1 and S2, no murmurs, rubs, clicks, or gallops, no carotid bruits  Abdomen: soft, non-tender, non-distended, normal bowel sounds, no masses or organomegaly  Extremities: no cyanosis, clubbing or edema  Musculoskeletal: normal range of motion, no joint swelling, deformity or tenderness  Integumentary: No rashes, no abnormal skin lesions, no petechiae  Neurologic: reflexes normal and symmetric, no cranial nerve deficit  Psych:  Orientation, sensorium, mood normal            Lines: PICC     Data Review:    CBC:   Lab Results   Component Value Date    WBC 9.4 07/28/2022    HGB 8.3 (L) 07/28/2022    HCT 24.0 (L) 07/28/2022    MCV 90.8 07/28/2022     (H) 07/28/2022     RENAL:   Lab Results   Component Value Date    CREATININE <0.5 (L) 07/28/2022    BUN 9 07/28/2022     07/28/2022    K 3.7 07/28/2022    CL 99 07/28/2022    CO2 32 07/28/2022     SED RATE: No results found for: SEDRATE  CK: No results found for: CKTOTAL  CRP: No results found for: CRP  Hepatic Function Panel:   Lab Results   Component Value Date/Time    ALKPHOS 88 07/18/2022 05:20 PM    ALT 65 07/18/2022 05:20 PM    AST 61 07/18/2022 05:20 PM    PROT 5.6 07/18/2022 05:20 PM    PROT 6.9 10/12/2012 11:00 AM BILITOT 0.5 07/18/2022 05:20 PM    LABALBU 2.4 07/18/2022 05:20 PM     UA:  Lab Results   Component Value Date/Time    COLORU Yellow 07/22/2022 06:31 AM    CLARITYU Clear 07/22/2022 06:31 AM    GLUCOSEU Negative 07/22/2022 06:31 AM    BILIRUBINUR Negative 07/22/2022 06:31 AM    BILIRUBINUR neg 10/17/2019 11:50 AM    KETUA TRACE 07/22/2022 06:31 AM    SPECGRAV 1.049 07/22/2022 06:31 AM    BLOODU Negative 07/22/2022 06:31 AM    PHUR 6.5 07/22/2022 06:31 AM    PROTEINU 30 07/22/2022 06:31 AM    UROBILINOGEN 0.2 07/22/2022 06:31 AM    NITRU Negative 07/22/2022 06:31 AM    LEUKOCYTESUR Negative 07/22/2022 06:31 AM    LABMICR YES 07/22/2022 06:31 AM    URINETYPE NotGiven 07/22/2022 06:31 AM      Urine Microscopic:   Lab Results   Component Value Date/Time    LABCAST 10-20 Hyaline 01/10/2017 06:19 PM    BACTERIA None Seen 07/22/2022 06:31 AM    COMU see below 07/03/2022 03:11 PM    HYALCAST 2 07/22/2022 06:31 AM    WBCUA 2 07/22/2022 06:31 AM    RBCUA 5 07/22/2022 06:31 AM    EPIU 1 07/22/2022 06:31 AM     Urine Reflex to Culture:   Lab Results   Component Value Date/Time    URRFLXCULT Not Indicated 07/03/2022 03:11 PM         MICRO: cultures reviewed and updated by me   Blood Culture:          Culture, Respiratory [[id number]] (Abnormal)  Collected: 07/22/22 1200   Order Status: Completed Specimen: Sputum Expectorated Updated: 07/24/22 0801    CULTURE, RESPIRATORY Rare growth normal respiratory fabiana with Abnormal     Gram Stain Result No Epithelial Cells seen   3+ WBC's (Polymorphonuclear)   No organisms seen     Organism Pseudomonas aeruginosa Abnormal     CULTURE, RESPIRATORY Light growth   Narrative:     ORDER#: [id number]                          ORDERED BY: CIRA KEBEDE   SOURCE: Sputum Expectorated                COLLECTED:  07/22/22 12:00   ANTIBIOTICS AT GURWINDER.:                      RECEIVED :  07/22/22 12:22   Respiratory Culture [[id number]] (Abnormal)  Collected: 07/19/22 2030   Order Status: Completed Specimen: Sputum Expectorated Updated: 07/23/22 0748    CULTURE, RESPIRATORY Light growth normal respiratory fabiana with Abnormal     Gram Stain Result 2+ Gram positive cocci   2+ WBC's (Polymorphonuclear)   1+ Epithelial Cells     Organism Pseudomonas aeruginosa Abnormal     CULTURE, RESPIRATORY Light growth   Narrative:     ORDER#: [id number]                          ORDERED BY: FARZANA GAN   SOURCE: Sputum Expectorated                COLLECTED:  07/19/22 20:30   ANTIBIOTICS AT GURWINDER.:                      RECEIVED :  07/19/22 21:19   Culture, Blood 1 [[id number]] Collected: 07/18/22 1841   Order Status: Completed Specimen: Blood Updated: 07/22/22 2015    Blood Culture, Routine No Growth after 4 days of incubation. Narrative:     ORDER#: [id number]                          ORDERED BY: Jordy Kuo   SOURCE: Blood                              COLLECTED:  07/18/22 18:41   ANTIBIOTICS AT GURWINDER.:                      RECEIVED :  07/18/22 18:56   If child <=2 yrs old please draw pediatric bottle. ~Blood Culture 1   Culture, Blood 2 [[id number]] Collected: 07/18/22 1850   Order Status: Completed Specimen: Blood Updated: 07/22/22 2015    Culture, Blood 2 No Growth after 4 days of incubation. Narrative:     ORDER#: [id number]                          ORDERED BY: Jordy Kuo   SOURCE: Blood                              COLLECTED:  07/18/22 18:50   ANTIBIOTICS AT GURWINDER.:                      RECEIVED :  07/18/22 18:56   If child <=2 yrs old please draw pediatric bottle. ~Blood Culture #2   Strep Pneumoniae Antigen [[id number]] Collected: 07/20/22 1020   Order Status: Completed Specimen: Urine, clean catch Updated: 07/21/22 0841    STREP PNEUMONIAE ANTIGEN, URINE --    Presumptive Negative   Presumptive negative suggests no current or recent   pneumococcal infection.  Infection due to Strep pneumoniae   cannot be ruled out since the antigen present in the sample   may be below the detection limit of the test.   Normal Range:Presumptive Negative    Narrative:     ORDER#: [id number]                          ORDERED BY: FARZANA GAN   SOURCE: Urine Clean Catch                  COLLECTED:  07/20/22 10:20   ANTIBIOTICS AT GURWINDER.:                      RECEIVED :  07/20/22 10:27   Legionella antigen, urine [[id number]] Collected: 07/20/22 1020   Order Status: Completed Specimen: Urine, catheter Updated: 07/21/22 0840    L. pneumophila Serogp 1 Ur Ag --    Presumptive Negative   No Legionella pneumophila serogroup 1 antigens detected. A negative result does not exclude infection with   Legionella pneumophila serogroup 1 nor does it rule out   other microbial-caused respiratory infections or   disease caused by other serogroups of   Legionella pneumophila. Normal Range: Presumptive Negative    Narrative:     ORDER#: [id number]                          ORDERED BY: FARZANA GAN   SOURCE: Urine Catheter                     COLLECTED:  07/20/22 10:20   ANTIBIOTICS AT GURWINDER.:                      RECEIVED :  07/20/22 10:28   Sputum gram stain [[id number]] Collected: 07/19/22 2030   Order Status: Canceled Specimen: Sputum Expectorated    Rapid influenza A/B antigens [[id number]] Collected: 07/19/22 0210   Order Status: Completed Specimen: Nares Updated: 07/19/22 0245    Rapid Influenza A Ag Negative    Rapid Influenza B Ag Negative   COVID-19, Rapid [[id number]] Collected: 07/18/22 1825   Order Status: Completed Specimen: Nasopharyngeal Swab Updated: 07/18/22 1854    SARS-CoV-2, NAAT Not Detected    Comment: Rapid NAAT:   Negative results should be treated as presumptive and,   if inconsistent with clinical signs and symptoms or necessary for   patient management, should be tested with an alternative molecular   assay. Negative results do not preclude SARS-CoV-2 infection and   should not be used as the sole basis for patient management decisions.    This test has been authorized by the FDA under an Emergency Use   Authorization (EUA) for use by authorized laboratories. Fact sheet for Healthcare Providers:   BuildHer.es   Fact sheet for Patients: BuildHer.es     METHODOLOGY: Isothermal Nucleic Acid Amplification         CULTURE, RESPIRATORY Rare growth normal respiratory fabiana with Abnormal     Gram Stain Result No Epithelial Cells seen   3+ WBC's (Polymorphonuclear)   No organisms seen    Organism Pseudomonas aeruginosa Abnormal     CULTURE, RESPIRATORY Light growth    Resulting Agency [facility]          Susceptibility      Pseudomonas aeruginosa (1)    Antibiotic Interpretation Microscan  Method Status    cefepime Sensitive 8 mcg/mL BACTERIAL SUSCEPTIBILITY PANEL BY TIERRA     ciprofloxacin Resistant >2 mcg/mL BACTERIAL SUSCEPTIBILITY PANEL BY TIERRA     gentamicin Sensitive <=4 mcg/mL BACTERIAL SUSCEPTIBILITY PANEL BY TIERRA     meropenem Sensitive <=1 mcg/mL BACTERIAL SUSCEPTIBILITY PANEL BY TIERRA     piperacillin-tazobactam Sensitive <=16 mcg/mL BACTERIAL SUSCEPTIBILITY PANEL BY TIERRA     tobramycin Sensitive <=4 mcg/mL BACTERIAL SUSCEPTIBILITY PANEL BY TIERRA          Narrative  Performed by: [facility]  ORDER#: [id number]                          ORDERED BY: CIRA KEBEDE   SOURCE: Sputum Expectorated                COLLECTED:  07/22/22 12:00   ANTIBIOTICS AT GURWINDER.:                      RECEIVED :  07/22/22 12:22           Lab Results   Component Value Date/Time    BC No Growth after 4 days of incubation. 07/18/2022 06:41 PM    BLOODCULT2 No Growth after 4 days of incubation.  07/18/2022 06:50 PM       Respiratory Culture:  Lab Results   Component Value Date/Time    CULTRESP Rare growth normal respiratory fabiana with 07/22/2022 12:00 PM    CULTRESP Light growth 07/22/2022 12:00 PM    LABGRAM  07/22/2022 12:00 PM     No Epithelial Cells seen  3+ WBC's (Polymorphonuclear)  No organisms seen       AFB:No results found for: AFBSMEAR  Viral Culture:  Lab Results   Component Value Date/Time    COVID19 Not Detected 07/18/2022 06:25 PM     Urine Culture: No results for input(s): LABURIN in the last 72 hours. IMAGING:    XR CHEST PORTABLE   Final Result   Stable multifocal airspace disease in the right lung, including probable   pulmonary abscess in the right upper lobe. CT CHEST WO CONTRAST   Preliminary Result   1. Mixed consolidative and ground-glass opacities as well as interstitial   thickening throughout the majority of the right lung compatible with   pneumonia. 2. There is a cavity in the right upper lobe demonstrating an air-fluid level   measuring up to 9.7 cm concerning for abscess formation. 3. Trace right pleural effusion. 4. Prominent and enlarged mediastinal lymph nodes, likely reactive. 5. Stable 6 mm left lower lobe pulmonary nodule. RECOMMENDATIONS:   Fleischner Society guidelines for follow-up and management of incidentally   detected pulmonary nodules:      Single Solid Nodule:      Nodule size less than 6 mm   In a low-risk patient, no routine follow-up. In a high-risk patient, optional CT at 12 months. Nodule size equals 6-8 mm   In a low-risk patient, CT at 6-12 months, then consider CT at 18-24 months. In a high-risk patient, CT at 6-12 months, then CT at 18-24 months. Nodule size greater than 8 mm         In a low-risk patient, consider CT at 3 months, PET/CT, or tissue sampling. In a high-risk patient, consider CT at 3 months, PET/CT, or tissue sampling. Multiple Solid Nodules:      Nodule size less than 6 mm   In a low-risk patient, no routine follow-up. In a high-risk patient, optional CT at 12 months. Nodule size equals 6-8 mm   In a low-risk patient, CT at 3-6 months, then consider CT at 18-24 months. In a high-risk patient, CT at 3-6 months, then CT at 18-24 months. Nodule size greater than 8 mm   In a low-risk patient, CT at 3-6 months, then consider CT at 18-24 months.    In a high-risk patient, CT at tiotropium  2 puff Inhalation Daily       Continuous Infusions:   sodium chloride 5 mL/hr at 07/25/22 0502    dextrose         PRN Meds:  HYDROcodone 5 mg - acetaminophen, sodium chloride flush, sodium chloride, acetaminophen, [Held by provider] oxyCODONE, [Held by provider] HYDROcodone 5 mg - acetaminophen, perflutren lipid microspheres, traZODone, ipratropium-albuterol, cetirizine, albuterol sulfate HFA, glucose, dextrose bolus **OR** dextrose bolus, glucagon (rDNA), dextrose, ondansetron, polyethylene glycol      Assessment:     Patient Active Problem List   Diagnosis    Osteopenia-last dexa 2009 repeat 2015 (-2.4 hip)--advised tx    Colon polyp, hyperplastic,-(done 3/11-repeat 3-5 yrs--dr Jarrod Caldwell)    Family history of colon cancer    CTS (carpal tunnel syndrome)BILATERAL-s/p surgical repair--resolved     Postmenopausal status-last mammogram 2/15 wnl last pap 12/13--wnl    Nondependent alcohol abuse, in remission    Urticaria, chronic    Smoking    Chronic back pain-(djd) saw dr Harpreet Antunez afford surgery--seeing dr Audie Bowens for pain meds    Fibromyalgia    Hypercholesteremia    Lumbar spondylosis    Vitamin D deficiency--severe--started 50,000 iu 2x/ wk 11/13    Insomnia--on elevil w help    Constipation--on daily miralax    Depression    Chronic pain syndrome    Muscle cramping    Acute on chronic respiratory failure with hypercapnia (HCC)    ROLY (acute kidney injury) ([address]    Severe sepsis (HCC)    Gastroesophageal reflux disease    COPD, severe ([address]    Chronic hypoxemic respiratory failure ([address]    Degeneration of lumbar or lumbosacral intervertebral disc    Trochanteric bursitis of right hip    COPD exacerbation ([address]    Diabetes ([address]    Controlled type 2 diabetes mellitus without complication, without long-term current use of insulin ([address]    Age-related osteoporosis without current pathological fracture- started alendronate 9/2021    Pulmonary nodule seen on imaging study    Pneumonia of right lung due to infectious organism    General weakness    Elevated lactic acid level    Community acquired pneumonia of right lung    Chronic obstructive pulmonary disease (HCC)    Acute respiratory failure with hypoxia (HCC)    Acute on chronic respiratory failure with hypoxia and hypercapnia (HCC)    Abnormal EKG    NSTEMI (non-ST elevated myocardial infarction) (HCC)    Necrotizing pneumonia (HCC)    Abscess of upper lobe of right lung with pneumonia (HCC)    Pseudomonas respiratory infection    Abnormal CT of the chest     Severe necrotizing Pseudomonas pneumonia  Right upper lobe cavitary lesion  Right right lung evolving abscess  Right lung dense consolidation of  Pseudomonas pneumonia developing some resistance  COPD exacerbation  Hypoxic respiratory failure  Coronary artery disease  Status post cardiac cath  Chronic smoking  Abnormal CT chest  BNP elevation  COVID-19 negative        Unfortunately she developed progressive changes with dense consolidation and lung abscess secondary to Pseudomonas pneumonia. Pseudomonas appears to have developed resistance to quinolones while on therapy this is concerning. CT chest on this admission grossly abnormal and definitely there is progressive changes given the severity of the -pneumonia will need IV antibiotics     OPAT d/w pt she needs to QUIT smoking d/w pt and her family      unfortunately still has Severe hypoxia and respiratory distress required transfer to ICU now on BiPAP. Heart rate is elevated secondary to respiratory distress-. Not able to come off BiPAP for extended period of time transferred out to progressive care unit.     Given the lung findings will have to continue antibiotic therapy anticipate least 4 more weeks of duration of treatment    Labs, Microbiology, Radiology and all the pertinent results from current hospitalization and  care every where were reviewed  by me as a part of the evaluation   Plan:   IV Meropenem 1 gm q 8 HR X 4 weeks  Inh Tobramycin Neb Q 12 hRS  Picc line placed   OPAT d/w pt  QUIT smoking  Will repeat CT chest in  3 weeks  Cont supportive care  CT images reviewed see above    Risk for progression and intubation high   Was on  BIPAP for resp failure and worsening resp status now tx to PCU         Discussed with patient/Family and Nursing   Risk of Complications/Morbidity: High      Illness(es)/ Infection present that pose threat to bodily function. There is potential for severe exacerbation of infection/side effects of treatment. Therapy requires intensive monitoring for antimicrobial agent toxicity. Discussed with patient/Family and Nursing staff     Thanks for allowing me to participate in your patient's care and please call me with any questions or concerns.     Toyin Falcon MD  Infectious Disease  [facility] Physician  Phone: [phone]   Fax : [phone]

## 2022-07-28 NOTE — PROGRESS NOTES
Orders received to transfer pt to  room 5120. Pt and family made aware of transfer, verbalized understanding. All of pt's belongings packed and taken with pt. Report called and given to [facility].   Electronically signed by Tita Jack RN on 7/28/2022 at 1:32 PM

## 2022-07-28 NOTE — PROGRESS NOTES
Hospitalist Progress Note      PCP: HECTOR Caceres - CNP    Date of Admission: 7/18/2022    Chief Complaint: respiratory distress    Hospital Course:      Subjective: On 10 L nasal cannula, breathing comfortably. Medications:  Reviewed    Infusion Medications    sodium chloride 5 mL/hr at 07/25/22 0502    dextrose       Scheduled Medications    enoxaparin  40 mg SubCUTAneous Daily    meropenem  1,000 mg IntraVENous Q8H    tobramycin (PF)  300 mg Nebulization BID    sodium chloride  500 mL IntraVENous Once    aspirin  81 mg Oral Daily    nystatin  5 mL Oral 4x Daily    prasugrel  10 mg Oral Daily    sodium chloride flush  5-40 mL IntraVENous 2 times per day    atorvastatin  40 mg Oral Nightly    tiZANidine  4 mg Oral Nightly    pantoprazole  40 mg Oral Daily    gabapentin  600 mg Oral BID    DULoxetine  60 mg Oral BID    insulin lispro  0-12 Units SubCUTAneous TID WC    insulin lispro  0-6 Units SubCUTAneous Nightly    mometasone-formoterol  2 puff Inhalation BID    And    tiotropium  2 puff Inhalation Daily     PRN Meds: HYDROcodone 5 mg - acetaminophen, sodium chloride flush, sodium chloride, acetaminophen, [Held by provider] oxyCODONE, [Held by provider] HYDROcodone 5 mg - acetaminophen, perflutren lipid microspheres, traZODone, ipratropium-albuterol, cetirizine, albuterol sulfate HFA, glucose, dextrose bolus **OR** dextrose bolus, glucagon (rDNA), dextrose, ondansetron, polyethylene glycol      Intake/Output Summary (Last 24 hours) at 7/28/2022 1615  Last data filed at 7/28/2022 1300  Gross per 24 hour   Intake --   Output 600 ml   Net -600 ml         Exam:    /79   Pulse (!) 102   Temp 98.1 °F (36.7 °C) (Oral)   Resp 21   Ht 5' 4\" (1.626 m)   Wt 137 lb 12.6 oz (62.5 kg)   SpO2 96%   BMI 23.65 kg/m²     General appearance: Breathing comfortably on Bipap appears stated age and cooperative. HEENT: Pupils equal, round, and reactive to light.  Conjunctivae/corneas clear.  Neck: Supple, with full range of motion. No jugular venous distention. Trachea midline. Respiratory:  Comfortably on Bipap. Clear to auscultation, bilaterally without Rales/Wheezes/Rhonchi. Cardiovascular: Regular rate and rhythm with normal S1/S2 without murmurs, rubs or gallops. Abdomen: Soft, non-tender, non-distended with normal bowel sounds. Musculoskeletal: No clubbing, cyanosis or edema bilaterally. Full range of motion without deformity. Skin: Skin color, texture, turgor normal.  No rashes or lesions. Neurologic:  Neurovascularly intact without any focal sensory/motor deficits. Cranial nerves: II-XII intact, grossly non-focal.  Psychiatric: Alert and oriented, thought content appropriate, normal insight  Capillary Refill: Brisk,< 3 seconds   Peripheral Pulses: +2 palpable, equal bilaterally       Labs:   Recent Labs     07/26/22 0447 07/27/22  0358 07/28/22  0500   WBC 6.3 6.7 9.4   HGB 8.3* 8.9* 8.3*   HCT 24.2* 25.3* 24.0*    477* 527*       Recent Labs     07/26/22 0447 07/27/22  0358 07/28/22  0500    138 136   K 3.5 3.6 3.7    102 99   CO2 26 28 32   BUN 6* 5* 9   CREATININE <0.5* <0.5* <0.5*   CALCIUM 7.9* 7.8* 7.9*   PHOS  --  3.1 3.1       No results for input(s): AST, ALT, BILIDIR, BILITOT, ALKPHOS in the last 72 hours. No results for input(s): INR in the last 72 hours. No results for input(s): Rhae Childes in the last 72 hours. Urinalysis:      Lab Results   Component Value Date/Time    NITRU Negative 07/22/2022 06:31 AM    WBCUA 2 07/22/2022 06:31 AM    BACTERIA None Seen 07/22/2022 06:31 AM    RBCUA 5 07/22/2022 06:31 AM    BLOODU Negative 07/22/2022 06:31 AM    SPECGRAV 1.049 07/22/2022 06:31 AM    GLUCOSEU Negative 07/22/2022 06:31 AM       Radiology:  XR CHEST PORTABLE   Final Result   Stable multifocal airspace disease in the right lung, including probable   pulmonary abscess in the right upper lobe.          CT CHEST WO CONTRAST   Preliminary Result   1. Mixed consolidative and ground-glass opacities as well as interstitial   thickening throughout the majority of the right lung compatible with   pneumonia. 2. There is a cavity in the right upper lobe demonstrating an air-fluid level   measuring up to 9.7 cm concerning for abscess formation. 3. Trace right pleural effusion. 4. Prominent and enlarged mediastinal lymph nodes, likely reactive. 5. Stable 6 mm left lower lobe pulmonary nodule. RECOMMENDATIONS:   Fleischner Society guidelines for follow-up and management of incidentally   detected pulmonary nodules:      Single Solid Nodule:      Nodule size less than 6 mm   In a low-risk patient, no routine follow-up. In a high-risk patient, optional CT at 12 months. Nodule size equals 6-8 mm   In a low-risk patient, CT at 6-12 months, then consider CT at 18-24 months. In a high-risk patient, CT at 6-12 months, then CT at 18-24 months. Nodule size greater than 8 mm         In a low-risk patient, consider CT at 3 months, PET/CT, or tissue sampling. In a high-risk patient, consider CT at 3 months, PET/CT, or tissue sampling. Multiple Solid Nodules:      Nodule size less than 6 mm   In a low-risk patient, no routine follow-up. In a high-risk patient, optional CT at 12 months. Nodule size equals 6-8 mm   In a low-risk patient, CT at 3-6 months, then consider CT at 18-24 months. In a high-risk patient, CT at 3-6 months, then CT at 18-24 months. Nodule size greater than 8 mm   In a low-risk patient, CT at 3-6 months, then consider CT at 18-24 months. In a high-risk patient, CT at 3-6 months, then CT at 18-24 months. - Low risk patients include individuals with minimal or absent history of   smoking and other known risk factors. - High risk patients include individuals with a history or smoking or known   risk factors. Radiology 2017 http://pubs. rsna.org/doi/full/10.1148/radiol. 8176653021         XR Dr Mavis Loya on this admission  with stent  Now on antiplatelet therapy     Anemia  No evidence of bleeding  Suspect anemia of chronic disease  Will check iron studies TIBC ferritin % and reticulocyte count  No indication to transfuse at this point  ? Benefit to EPO     COPD, severe ([address] - without acute exacerbation. Will provide Nebulizer treatments as needed and continue home medications. Patient will be monitored closely, and deep breathing and coughing will be encouraged while awake. Hyperlipidemia - No current evidence of Rhabdomyolysis or other adverse effects. Continue statin therapy while in the hospital     Chronic pain syndrome -continue her home pain med regimen     Fibromyalgia - provide supportive care    DVT Prophylaxis: heparin  Diet: ADULT DIET; Regular;  Low Sodium (2 gm)  ADULT ORAL NUTRITION SUPPLEMENT; Breakfast, Lunch, Dinner; Standard High Calorie/High Protein Oral Supplement  Code Status: Full Code    PT/OT Eval Status: evaluating    Dispo -PCU, stabilizing, continue to reassess Anali Guevara MD

## 2022-07-29 LAB
ANION GAP SERPL CALCULATED.3IONS-SCNC: 4 MMOL/L (ref 3–16)
BASOPHILS ABSOLUTE: 0 K/UL (ref 0–0.2)
BASOPHILS RELATIVE PERCENT: 0.2 %
BUN BLDV-MCNC: 11 MG/DL (ref 7–20)
CALCIUM SERPL-MCNC: 7.8 MG/DL (ref 8.3–10.6)
CHLORIDE BLD-SCNC: 95 MMOL/L (ref 99–110)
CO2: 33 MMOL/L (ref 21–32)
CREAT SERPL-MCNC: <0.5 MG/DL (ref 0.6–1.2)
EOSINOPHILS ABSOLUTE: 0.3 K/UL (ref 0–0.6)
EOSINOPHILS RELATIVE PERCENT: 2.7 %
GFR AFRICAN AMERICAN: >60
GFR NON-AFRICAN AMERICAN: >60
GLUCOSE BLD-MCNC: 125 MG/DL (ref 70–99)
GLUCOSE BLD-MCNC: 136 MG/DL (ref 70–99)
GLUCOSE BLD-MCNC: 155 MG/DL (ref 70–99)
GLUCOSE BLD-MCNC: 93 MG/DL (ref 70–99)
GLUCOSE BLD-MCNC: 98 MG/DL (ref 70–99)
HCT VFR BLD CALC: 23.5 % (ref 36–48)
HEMOGLOBIN: 8.1 G/DL (ref 12–16)
LYMPHOCYTES ABSOLUTE: 1 K/UL (ref 1–5.1)
LYMPHOCYTES RELATIVE PERCENT: 10.7 %
MAGNESIUM: 1.7 MG/DL (ref 1.8–2.4)
MCH RBC QN AUTO: 31 PG (ref 26–34)
MCHC RBC AUTO-ENTMCNC: 34.6 G/DL (ref 31–36)
MCV RBC AUTO: 89.8 FL (ref 80–100)
MONOCYTES ABSOLUTE: 1.1 K/UL (ref 0–1.3)
MONOCYTES RELATIVE PERCENT: 10.8 %
NEUTROPHILS ABSOLUTE: 7.3 K/UL (ref 1.7–7.7)
NEUTROPHILS RELATIVE PERCENT: 75.6 %
PDW BLD-RTO: 14.1 % (ref 12.4–15.4)
PERFORMED ON: ABNORMAL
PERFORMED ON: NORMAL
PHOSPHORUS: 2.5 MG/DL (ref 2.5–4.9)
PLATELET # BLD: 618 K/UL (ref 135–450)
PMV BLD AUTO: 6.8 FL (ref 5–10.5)
POTASSIUM REFLEX MAGNESIUM: 4 MMOL/L (ref 3.5–5.1)
RBC # BLD: 2.61 M/UL (ref 4–5.2)
SODIUM BLD-SCNC: 132 MMOL/L (ref 136–145)
WBC # BLD: 9.7 K/UL (ref 4–11)

## 2022-07-29 PROCEDURE — 6370000000 HC RX 637 (ALT 250 FOR IP): Performed by: NURSE PRACTITIONER

## 2022-07-29 PROCEDURE — 94640 AIRWAY INHALATION TREATMENT: CPT

## 2022-07-29 PROCEDURE — 85025 COMPLETE CBC W/AUTO DIFF WBC: CPT

## 2022-07-29 PROCEDURE — 94669 MECHANICAL CHEST WALL OSCILL: CPT

## 2022-07-29 PROCEDURE — 97530 THERAPEUTIC ACTIVITIES: CPT | Performed by: PHYSICAL THERAPIST

## 2022-07-29 PROCEDURE — 2580000003 HC RX 258: Performed by: INTERNAL MEDICINE

## 2022-07-29 PROCEDURE — 83735 ASSAY OF MAGNESIUM: CPT

## 2022-07-29 PROCEDURE — 6370000000 HC RX 637 (ALT 250 FOR IP): Performed by: INTERNAL MEDICINE

## 2022-07-29 PROCEDURE — 97530 THERAPEUTIC ACTIVITIES: CPT

## 2022-07-29 PROCEDURE — 99233 SBSQ HOSP IP/OBS HIGH 50: CPT | Performed by: INTERNAL MEDICINE

## 2022-07-29 PROCEDURE — 84100 ASSAY OF PHOSPHORUS: CPT

## 2022-07-29 PROCEDURE — 94761 N-INVAS EAR/PLS OXIMETRY MLT: CPT

## 2022-07-29 PROCEDURE — 2060000000 HC ICU INTERMEDIATE R&B

## 2022-07-29 PROCEDURE — 2700000000 HC OXYGEN THERAPY PER DAY

## 2022-07-29 PROCEDURE — 6360000002 HC RX W HCPCS: Performed by: INTERNAL MEDICINE

## 2022-07-29 PROCEDURE — 80048 BASIC METABOLIC PNL TOTAL CA: CPT

## 2022-07-29 PROCEDURE — 6360000002 HC RX W HCPCS: Performed by: NURSE PRACTITIONER

## 2022-07-29 PROCEDURE — 99232 SBSQ HOSP IP/OBS MODERATE 35: CPT | Performed by: INTERNAL MEDICINE

## 2022-07-29 RX ADMIN — ENOXAPARIN SODIUM 40 MG: 100 INJECTION SUBCUTANEOUS at 08:21

## 2022-07-29 RX ADMIN — NYSTATIN 500000 UNITS: 100000 SUSPENSION ORAL at 14:14

## 2022-07-29 RX ADMIN — ASPIRIN 81 MG: 81 TABLET, COATED ORAL at 08:20

## 2022-07-29 RX ADMIN — NYSTATIN 500000 UNITS: 100000 SUSPENSION ORAL at 18:11

## 2022-07-29 RX ADMIN — TOBRAMYCIN 300 MG: 300 SOLUTION RESPIRATORY (INHALATION) at 19:36

## 2022-07-29 RX ADMIN — NYSTATIN 500000 UNITS: 100000 SUSPENSION ORAL at 08:21

## 2022-07-29 RX ADMIN — TIOTROPIUM BROMIDE INHALATION SPRAY 2 PUFF: 3.12 SPRAY, METERED RESPIRATORY (INHALATION) at 08:48

## 2022-07-29 RX ADMIN — INSULIN LISPRO 1 UNITS: 100 INJECTION, SOLUTION INTRAVENOUS; SUBCUTANEOUS at 20:38

## 2022-07-29 RX ADMIN — HYDROCODONE BITARTRATE AND ACETAMINOPHEN 1 TABLET: 5; 325 TABLET ORAL at 11:03

## 2022-07-29 RX ADMIN — MEROPENEM 1000 MG: 1 INJECTION, POWDER, FOR SOLUTION INTRAVENOUS at 22:13

## 2022-07-29 RX ADMIN — DULOXETINE HYDROCHLORIDE 60 MG: 60 CAPSULE, DELAYED RELEASE ORAL at 20:37

## 2022-07-29 RX ADMIN — GABAPENTIN 600 MG: 300 CAPSULE ORAL at 08:21

## 2022-07-29 RX ADMIN — ATORVASTATIN CALCIUM 40 MG: 40 TABLET, FILM COATED ORAL at 20:37

## 2022-07-29 RX ADMIN — MEROPENEM 1000 MG: 1 INJECTION, POWDER, FOR SOLUTION INTRAVENOUS at 05:43

## 2022-07-29 RX ADMIN — HYDROCODONE BITARTRATE AND ACETAMINOPHEN 1 TABLET: 5; 325 TABLET ORAL at 03:54

## 2022-07-29 RX ADMIN — MEROPENEM 1000 MG: 1 INJECTION, POWDER, FOR SOLUTION INTRAVENOUS at 14:18

## 2022-07-29 RX ADMIN — HYDROCODONE BITARTRATE AND ACETAMINOPHEN 1 TABLET: 5; 325 TABLET ORAL at 17:07

## 2022-07-29 RX ADMIN — PANTOPRAZOLE SODIUM 40 MG: 40 TABLET, DELAYED RELEASE ORAL at 08:20

## 2022-07-29 RX ADMIN — GABAPENTIN 600 MG: 300 CAPSULE ORAL at 20:37

## 2022-07-29 RX ADMIN — SODIUM CHLORIDE, PRESERVATIVE FREE 10 ML: 5 INJECTION INTRAVENOUS at 08:21

## 2022-07-29 RX ADMIN — NYSTATIN 500000 UNITS: 100000 SUSPENSION ORAL at 20:37

## 2022-07-29 RX ADMIN — PRASUGREL 10 MG: 10 TABLET, FILM COATED ORAL at 08:20

## 2022-07-29 RX ADMIN — MOMETASONE FUROATE AND FORMOTEROL FUMARATE DIHYDRATE 2 PUFF: 200; 5 AEROSOL RESPIRATORY (INHALATION) at 19:37

## 2022-07-29 RX ADMIN — TOBRAMYCIN 300 MG: 300 SOLUTION RESPIRATORY (INHALATION) at 08:53

## 2022-07-29 RX ADMIN — DULOXETINE HYDROCHLORIDE 60 MG: 60 CAPSULE, DELAYED RELEASE ORAL at 08:20

## 2022-07-29 RX ADMIN — MOMETASONE FUROATE AND FORMOTEROL FUMARATE DIHYDRATE 2 PUFF: 200; 5 AEROSOL RESPIRATORY (INHALATION) at 08:50

## 2022-07-29 RX ADMIN — ACETAMINOPHEN 650 MG: 325 TABLET ORAL at 14:16

## 2022-07-29 RX ADMIN — TIZANIDINE 4 MG: 4 TABLET ORAL at 20:37

## 2022-07-29 ASSESSMENT — PAIN DESCRIPTION - DESCRIPTORS
DESCRIPTORS: ACHING

## 2022-07-29 ASSESSMENT — PAIN DESCRIPTION - ONSET
ONSET: ON-GOING

## 2022-07-29 ASSESSMENT — PAIN - FUNCTIONAL ASSESSMENT
PAIN_FUNCTIONAL_ASSESSMENT: ACTIVITIES ARE NOT PREVENTED

## 2022-07-29 ASSESSMENT — PAIN DESCRIPTION - PAIN TYPE
TYPE: CHRONIC PAIN

## 2022-07-29 ASSESSMENT — PAIN DESCRIPTION - FREQUENCY
FREQUENCY: CONTINUOUS

## 2022-07-29 ASSESSMENT — PAIN SCALES - GENERAL
PAINLEVEL_OUTOF10: 10
PAINLEVEL_OUTOF10: 0
PAINLEVEL_OUTOF10: 9
PAINLEVEL_OUTOF10: 5
PAINLEVEL_OUTOF10: 8
PAINLEVEL_OUTOF10: 9

## 2022-07-29 ASSESSMENT — PAIN DESCRIPTION - ORIENTATION
ORIENTATION: LOWER

## 2022-07-29 ASSESSMENT — PAIN DESCRIPTION - LOCATION
LOCATION: BACK

## 2022-07-29 ASSESSMENT — PAIN SCALES - WONG BAKER: WONGBAKER_NUMERICALRESPONSE: 6

## 2022-07-29 NOTE — PROGRESS NOTES
Hospitalist Progress Note      PCP: Ke Boyd, APRN - CNP    Date of Admission: 7/18/2022    Chief Complaint: respiratory distress    Hospital Course:      Subjective: Breathing more comfortably, weaning oxygen to 7L      Medications:  Reviewed    Infusion Medications    sodium chloride 5 mL/hr at 07/25/22 0502    dextrose       Scheduled Medications    enoxaparin  40 mg SubCUTAneous Daily    meropenem  1,000 mg IntraVENous Q8H    tobramycin (PF)  300 mg Nebulization BID    sodium chloride  500 mL IntraVENous Once    aspirin  81 mg Oral Daily    nystatin  5 mL Oral 4x Daily    prasugrel  10 mg Oral Daily    sodium chloride flush  5-40 mL IntraVENous 2 times per day    atorvastatin  40 mg Oral Nightly    tiZANidine  4 mg Oral Nightly    pantoprazole  40 mg Oral Daily    gabapentin  600 mg Oral BID    DULoxetine  60 mg Oral BID    insulin lispro  0-12 Units SubCUTAneous TID WC    insulin lispro  0-6 Units SubCUTAneous Nightly    mometasone-formoterol  2 puff Inhalation BID    And    tiotropium  2 puff Inhalation Daily     PRN Meds: HYDROcodone 5 mg - acetaminophen, sodium chloride flush, sodium chloride, acetaminophen, [Held by provider] oxyCODONE, [Held by provider] HYDROcodone 5 mg - acetaminophen, perflutren lipid microspheres, traZODone, ipratropium-albuterol, cetirizine, albuterol sulfate HFA, glucose, dextrose bolus **OR** dextrose bolus, glucagon (rDNA), dextrose, ondansetron, polyethylene glycol      Intake/Output Summary (Last 24 hours) at 7/29/2022 1943  Last data filed at 7/29/2022 6169  Gross per 24 hour   Intake 240 ml   Output 1325 ml   Net -1085 ml         Exam:    /78   Pulse 98   Temp 97.9 °F (36.6 °C) (Oral)   Resp 16   Ht 5' 4\" (1.626 m)   Wt 139 lb 15.9 oz (63.5 kg)   SpO2 95%   BMI 24.03 kg/m²     General appearance: Breathing comfortably on Bipap appears stated age and cooperative. HEENT: Pupils equal, round, and reactive to light.  Conjunctivae/corneas clear.  Neck: Supple, with full range of motion. No jugular venous distention. Trachea midline. Respiratory:  Comfortably on Bipap. Clear to auscultation, bilaterally without Rales/Wheezes/Rhonchi. Cardiovascular: Regular rate and rhythm with normal S1/S2 without murmurs, rubs or gallops. Abdomen: Soft, non-tender, non-distended with normal bowel sounds. Musculoskeletal: No clubbing, cyanosis or edema bilaterally. Full range of motion without deformity. Skin: Skin color, texture, turgor normal.  No rashes or lesions. Neurologic:  Neurovascularly intact without any focal sensory/motor deficits. Cranial nerves: II-XII intact, grossly non-focal.  Psychiatric: Alert and oriented, thought content appropriate, normal insight  Capillary Refill: Brisk,< 3 seconds   Peripheral Pulses: +2 palpable, equal bilaterally       Labs:   Recent Labs     07/27/22  0358 07/28/22  0500 07/29/22  0540   WBC 6.7 9.4 9.7   HGB 8.9* 8.3* 8.1*   HCT 25.3* 24.0* 23.5*   * 527* 618*       Recent Labs     07/27/22  0358 07/28/22  0500 07/29/22  0540    136 132*   K 3.6 3.7 4.0    99 95*   CO2 28 32 33*   BUN 5* 9 11   CREATININE <0.5* <0.5* <0.5*   CALCIUM 7.8* 7.9* 7.8*   PHOS 3.1 3.1 2.5       No results for input(s): AST, ALT, BILIDIR, BILITOT, ALKPHOS in the last 72 hours. No results for input(s): INR in the last 72 hours. No results for input(s): [place] in the last 72 hours. Urinalysis:      Lab Results   Component Value Date/Time    NITRU Negative 07/22/2022 06:31 AM    WBCUA 2 07/22/2022 06:31 AM    BACTERIA None Seen 07/22/2022 06:31 AM    RBCUA 5 07/22/2022 06:31 AM    BLOODU Negative 07/22/2022 06:31 AM    SPECGRAV 1.049 07/22/2022 06:31 AM    GLUCOSEU Negative 07/22/2022 06:31 AM       Radiology:  XR CHEST PORTABLE   Final Result   Stable multifocal airspace disease in the right lung, including probable   pulmonary abscess in the right upper lobe.          CT CHEST WO CONTRAST   Preliminary Result   1. Mixed consolidative and ground-glass opacities as well as interstitial   thickening throughout the majority of the right lung compatible with   pneumonia. 2. There is a cavity in the right upper lobe demonstrating an air-fluid level   measuring up to 9.7 cm concerning for abscess formation. 3. Trace right pleural effusion. 4. Prominent and enlarged mediastinal lymph nodes, likely reactive. 5. Stable 6 mm left lower lobe pulmonary nodule. RECOMMENDATIONS:   Fleischner Society guidelines for follow-up and management of incidentally   detected pulmonary nodules:      Single Solid Nodule:      Nodule size less than 6 mm   In a low-risk patient, no routine follow-up. In a high-risk patient, optional CT at 12 months. Nodule size equals 6-8 mm   In a low-risk patient, CT at 6-12 months, then consider CT at 18-24 months. In a high-risk patient, CT at 6-12 months, then CT at 18-24 months. Nodule size greater than 8 mm         In a low-risk patient, consider CT at 3 months, PET/CT, or tissue sampling. In a high-risk patient, consider CT at 3 months, PET/CT, or tissue sampling. Multiple Solid Nodules:      Nodule size less than 6 mm   In a low-risk patient, no routine follow-up. In a high-risk patient, optional CT at 12 months. Nodule size equals 6-8 mm   In a low-risk patient, CT at 3-6 months, then consider CT at 18-24 months. In a high-risk patient, CT at 3-6 months, then CT at 18-24 months. Nodule size greater than 8 mm   In a low-risk patient, CT at 3-6 months, then consider CT at 18-24 months. In a high-risk patient, CT at 3-6 months, then CT at 18-24 months. - Low risk patients include individuals with minimal or absent history of   smoking and other known risk factors. - High risk patients include individuals with a history or smoking or known   risk factors. Radiology 2017 http://pubs. rsna.org/doi/full/10.1148/radiol. 1392468516         XR CHEST PORTABLE   Final Result   1. Increased pneumonia throughout the right lung remaining most prominent in   the right upper lobe. 2. Emphysema better demonstrated on CT. 3. Possible trace right pleural effusion. XR CHEST PORTABLE   Final Result   Improved aeration of the right chest with persistent consolidation in the mid   to upper right chest.                 Assessment/Plan:    Active Hospital Problems    Diagnosis Date Noted    NSTEMI (non-ST elevated myocardial infarction) ([facility] [I21.4] 07/26/2022     Priority: Medium    Necrotizing pneumonia ([facility] [J85.0] 07/26/2022     Priority: Medium    Abscess of upper lobe of right lung with pneumonia ([facility] [J85.1] 07/26/2022     Priority: Medium    Pseudomonas respiratory infection [J98.8, B96.5] 07/26/2022     Priority: Medium    Abnormal CT of the chest [R93.89] 07/26/2022     Priority: Medium    Acute on chronic respiratory failure with hypoxia and hypercapnia (HCC) [J96.21, J96.22] 07/18/2022     Priority: Medium    Abnormal EKG [R94.31] 07/18/2022     Priority: Medium    Chronic obstructive pulmonary disease ([facility] [J44.9]      Priority: Medium    Pneumonia of right lung due to infectious organism [J18.9] 07/03/2022     Priority: Medium    COPD, severe ([facility] [J44.9] 02/24/2017    Chronic pain syndrome [G89.4] 03/14/2016    Hypercholesteremia [E78.00] 11/04/2013    Fibromyalgia [M79.7]     Smoking [F17.200] 08/27/2011     Acute on chronic respiratory failure with hypoxia and hypercapnia (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  Will require proloniged abx for abscess and close follow up visits from NH to doctors offices  Pulmonary and ID following  CT surgery consulted:  would need pneumonectomy for which she is a poor candidate --> medical therapy only     Critical coronary artery disease single-vessel  Status post PCI to left circumflex by Dr Mavis Loya on this admission  with stent  Now on antiplatelet therapy     Anemia  No evidence of bleeding  Suspect anemia of chronic disease  Will check iron studies TIBC ferritin % and reticulocyte count  No indication to transfuse at this point  ? Benefit to EPO     COPD, severe ([address] - without acute exacerbation. Will provide Nebulizer treatments as needed and continue home medications. Patient will be monitored closely, and deep breathing and coughing will be encouraged while awake. Hyperlipidemia - No current evidence of Rhabdomyolysis or other adverse effects. Continue statin therapy while in the hospital     Chronic pain syndrome -continue her home pain med regimen     Fibromyalgia - provide supportive care    DVT Prophylaxis: heparin  Diet: ADULT DIET; Regular;  Low Sodium (2 gm)  ADULT ORAL NUTRITION SUPPLEMENT; Breakfast, Lunch, Dinner; Standard High Calorie/High Protein Oral Supplement  Code Status: Full Code    PT/OT Eval Status: evaluating    Dispo -PCU, stabilizing, continue to reassess Anali Guevara MD

## 2022-07-29 NOTE — PLAN OF CARE
Problem: Discharge Planning  Goal: Discharge to home or other facility with appropriate resources  7/29/2022 0252 by Michel Ball RN  Outcome: Progressing  Flowsheets (Taken 7/28/2022 1956)  Discharge to home or other facility with appropriate resources: Identify barriers to discharge with patient and caregiver     Problem: Pain  Goal: Verbalizes/displays adequate comfort level or baseline comfort level  7/29/2022 0252 by Michel Ball RN  Outcome: Progressing     Problem: Confusion  Goal: Confusion, delirium, dementia, or psychosis is improved or at baseline  Description: INTERVENTIONS:  1. Assess for possible contributors to thought disturbance, including medications, impaired vision or hearing, underlying metabolic abnormalities, dehydration, psychiatric diagnoses, and notify attending LIP  2. [place] high risk fall precautions, as indicated  3. Provide frequent short contacts to provide reality reorientation, refocusing and direction  4. Decrease environmental stimuli, including noise as appropriate  5. Monitor and intervene to maintain adequate nutrition, hydration, elimination, sleep and activity  6. If unable to ensure safety without constant attention obtain sitter and review sitter guidelines with assigned personnel  7. Initiate Psychosocial CNS and Spiritual Care consult, as indicated  7/29/2022 0252 by Michel Ball RN  Outcome: Progressing     Problem: Skin/Tissue Integrity  Goal: Absence of new skin breakdown  Description: 1. Monitor for areas of redness and/or skin breakdown  2. Assess vascular access sites hourly  3. Every 4-6 hours minimum:  Change oxygen saturation probe site  4. Every 4-6 hours:  If on nasal continuous positive airway pressure, respiratory therapy assess nares and determine need for appliance change or resting period.   7/29/2022 0252 by Michel Ball RN  Outcome: Progressing

## 2022-07-29 NOTE — CONSULTS
[facility]  Palliative Care   Consult Note    NAME:  Jayla Napier  MEDICAL RECORD NUMBER:  [id number]  AGE: 78 y.o. GENDER: female  : 1945  TODAY'S DATE:  2022    Subjective     Reason for Consult:  goals of care and code status   Visit Type: Initial Consult      Jayla Napier is a 78 y.o. female referred by:   [x] Physician    PAST MEDICAL HISTORY      Diagnosis Date    CAD (coronary artery disease) 2022    NSTEMI, PCI LCx    Chronic pain syndrome     Percocet. Dr. Kieran Méndez    Colorectal polyps     COPD (chronic obstructive pulmonary disease) ([facility]     CTS (carpal tunnel syndrome)     BILATERAL    DDD (degenerative disc disease), lumbar     Depression     Family hx of colon cancer     Fibromyalgia     Hyperlipemia     IBS (irritable bowel syndrome)     Lumbar spondylosis     New onset type 2 diabetes mellitus ([facility] [address] 2020    On home O2     4L    Pneumonia 2022    P.aer    Urticaria, chronic        PAST SURGICAL HISTORY  Past Surgical History:   Procedure Laterality Date    CARPAL TUNNEL RELEASE Bilateral     CATARACT EXTRACTION      CERVICAL POLYP REMOVAL  2004    CORONARY ANGIOPLASTY WITH STENT PLACEMENT  2022    LCx 99. PCI/stent.     INTRACAPSULAR CATARACT EXTRACTION Left 2020    Phacoemulsification with intraocular lens implant performed by Fer Tiwari MD at [address] Right 2020    Phacoemulsification with intraocular lens implant performed by Fer Tiwari MD at [address]      patient denies        FAMILY HISTORY  Family History   Problem Relation Age of Onset    Cancer Father         COLON     Alzheimer's Disease Mother     Heart Disease Brother     Heart Failure Brother     Diabetes Daughter     Diabetes Daughter     Diabetes Daughter        SOCIAL HISTORY  Social History     Tobacco Use    Smoking status: Every Day     Packs/day: 1.00     Years: 55.00     Pack years: 55.00     Types: Cigarettes     Start date: 1/1/1962    Smokeless tobacco: Never    Tobacco comments:     almost ready to quit   Vaping Use    Vaping Use: Never used   Substance Use Topics    Alcohol use: No     Alcohol/week: 0.0 standard drinks    Drug use: No       ALLERGIES  No Known Allergies    MEDICATIONS  No current facility-administered medications on file prior to encounter. Current Outpatient Medications on File Prior to Encounter   Medication Sig Dispense Refill    gabapentin (NEURONTIN) 600 MG tablet Take 1 tablet by mouth 2 times daily for 30 days.  60 tablet 0    nystatin (MYCOSTATIN) 483757 UNIT/ML suspension Take 5 mLs by mouth 4 times daily 60 mL 0    benzocaine-menthol (CEPACOL SORE THROAT) 15-3.6 MG lozenge Take 1 lozenge by mouth every 2 hours as needed for Sore Throat 30 lozenge 0    DULoxetine (CYMBALTA) 60 MG extended release capsule TAKE 1 CAPSULE BY MOUTH TWICE A DAY 60 capsule 2    pantoprazole (PROTONIX) 40 MG tablet Take 1 tablet by mouth daily 30 tablet 1    atorvastatin (LIPITOR) 80 MG tablet TAKE 1 TABLET BY MOUTH EVERY DAY FOR CHOLESTEROL 90 tablet 1    tiZANidine (ZANAFLEX) 4 MG tablet Take 1 tablet by mouth nightly 30 tablet 1    meloxicam (MOBIC) 15 MG tablet Take 1 tablet by mouth daily 30 tablet 1    traZODone (DESYREL) 50 MG tablet Take 1-2 tablets by mouth nightly 60 tablet 1    Fluticasone-Umeclidin-Vilant (TRELEGY ELLIPTA) 200-62.5-25 MCG/INH AEPB Inhale 1 Inhaler into the lungs daily 1 each 5    magnesium oxide (MGO) 400 (241.3 Mg) MG TABS tablet Take 1 tablet by mouth 2 times daily as needed (for cramping) 60 tablet 0    alendronate (FOSAMAX) 70 MG tablet TAKE 1 TABLET BY MOUTH ONE TIME PER WEEK 12 tablet 1    albuterol sulfate  (90 Base) MCG/ACT inhaler Inhale 2 puffs into the lungs every 6 hours as needed for Shortness of Breath 1 each 11    Calcium Citrate-Vitamin D 500-500 MG-UNIT CHEW Take 1 tablet by mouth 2 times daily ONE TOUCH LANCETS MISC 1 each by Does not apply route daily 100 each 3    ipratropium-albuterol (DUONEB) 0.5-2.5 (3) MG/3ML SOLN nebulizer solution Take 1 aerosol every 4 hours for 2 days and then as needed for shortness of breath coughing and wheezing 360 mL 3    Cholecalciferol (VITAMIN D) 2000 units TABS tablet Take 1 tablet by mouth daily 30 tablet     cetirizine (ZYRTEC) 10 MG tablet Take 10 mg by mouth daily as needed for Allergies      Nebulizers (COMPRESSOR/NEBULIZER) MISC 1 Units by Does not apply route 4 times daily 1 each 3       Objective         /78   Pulse (!) 108   Temp 97.9 °F (36.6 °C) (Oral)   Resp 27   Ht 5' 4\" (1.626 m)   Wt 139 lb 15.9 oz (63.5 kg)   SpO2 90%   BMI 24.03 kg/m²     Code Status: Full Code    Advanced Directives: yes her daughter is MAGALI Keenehal [phone]     Assessment        Management and Education    Persons available for education:        [x] Self     [] Caregiver       [] Spouse       [x] Other Family Member   []  Other    Spiritual History:  notified: Yes,     Does the patient have a Primary Care Physician? Yes     Palliative Performance Scale:  60% [] Ambulation reduced; Significant disease; Can't do hobbies/housework; intake normal or reduced; occasional assist; LOC full/confusion  50% [x] Mainly sit/lie; Extensive disease; Can't do any work; Considerable assist; intake normal or reduced; LOC full/confusion  40% [] Mainly in bed; Extensive disease; Mainly assist; intake normal or reduced; occasional assist; LOC full/confusion  30% [] Bed Bound; Extensive disease; Total care; intake reduced; LOC full/confusion  20% [] Bed Bound; Extensive disease; Total care; intake minimal; Drowsy/coma  10% [] Bed Bound; Extensive disease;  Total care; Mouth care only; Drowsy/coma  0 [] Death    Level of patient/caregiver understanding able to:        [x] Verbalize Understanding   [] Demonstrate Understanding       [] Teach Back       [] Needs Reinforcement     []  Other:      Teaching Time:  1hours  0 minutes     Plan        Social Service Consult Made:  Yes  Assistance filling out Living Will/HPOA:  No      Discharge Plan:  Education/support to family  Education/support to patient  Code status clarified: Full Code  Palliative care orders introduced    Discharge Environment:  [x] ECF skilled care     Discussion: Patient admitted for increased shortness of breath. She did require brief stay in ICU on bipap, improving and now on PCU. I met with patient and her significant other to discuss goals of care. She live with her  is normally able to care for herself. She still feels weak and is agreeable to [facility] stay for therapy upon discharge. We have discussed code status, she was given written materials to help explain CPR. At this time she will remain full code if she changes her mind she will let bedside nurse know. I will continue to follow Ms. Ronnell Sol care as needed. Thank you for allowing me to participate in the care of Ms. Charis Andrade .      Electronically signed by Vicki Jordan RN, BSN, [facility] on 7/29/2022 at 12:28 PM  [address] [place] [address]  Office: [phone]

## 2022-07-29 NOTE — PLAN OF CARE
Problem: Discharge Planning  Goal: Discharge to home or other facility with appropriate resources  7/29/2022 1050 by Jackie Ko RN  Outcome: Progressing  Flowsheets (Taken 7/29/2022 0591)  Discharge to home or other facility with appropriate resources: Identify barriers to discharge with patient and caregiver     Problem: Pain  Goal: Verbalizes/displays adequate comfort level or baseline comfort level  7/29/2022 1050 by Jackie Ko RN  Outcome: Progressing  Flowsheets (Taken 7/29/2022 0823)  Verbalizes/displays adequate comfort level or baseline comfort level: Encourage patient to monitor pain and request assistance   Pain /discomfort being managed with PRN analgesics per MD orders. Patient able to express presence and absence of pain and rate pain appropriately using numerical scale. Problem: Confusion  Goal: Confusion, delirium, dementia, or psychosis is improved or at baseline  Description: INTERVENTIONS:  1. Assess for possible contributors to thought disturbance, including medications, impaired vision or hearing, underlying metabolic abnormalities, dehydration, psychiatric diagnoses, and notify attending LIP  2. [place] high risk fall precautions, as indicated  3. Provide frequent short contacts to provide reality reorientation, refocusing and direction  4. Decrease environmental stimuli, including noise as appropriate  5. Monitor and intervene to maintain adequate nutrition, hydration, elimination, sleep and activity  6. If unable to ensure safety without constant attention obtain sitter and review sitter guidelines with assigned personnel  7. Initiate Psychosocial CNS and Spiritual Care consult, as indicated  7/29/2022 1050 by Jackie Ko RN  Outcome: Progressing     Problem: Skin/Tissue Integrity  Goal: Absence of new skin breakdown  Description: 1. Monitor for areas of redness and/or skin breakdown  2. Assess vascular access sites hourly  3.   Every 4-6 hours minimum:  Change

## 2022-07-29 NOTE — PROGRESS NOTES
Occupational Therapy  Facility/Department: [facility] 9S PROGRESSIVE CARE  Occupational Therapy Re-Assessment    Name: Ileana Dimas  : 1945  MRN: [id number]  Date of Service: 2022    Discharge Recommendations:  3-5 sessions per week, Patient would benefit from continued therapy after discharge  OT Equipment Recommendations  Other: defer to [address] scored a 16/24 on the AM-PAC ADL Inpatient form. Current research shows that an AM-PAC score of 17 or less is typically not associated with a discharge to the patient's home setting. Based on the patient's AM-PAC score and their current ADL deficits, it is recommended that the patient have 3-5 sessions per week of Occupational Therapy at d/c to increase the patient's independence. Please see assessment section for further patient specific details. If patient discharges prior to next session this note will serve as a discharge summary. Please see below for the latest assessment towards goals. Patient Diagnosis(es): The primary encounter diagnosis was NSTEMI (non-ST elevated myocardial infarction) ([facility] [address] Diagnoses of Pneumonia of right lung due to infectious organism, unspecified part of lung and Hypoxia were also pertinent to this visit. Past Medical History:  has a past medical history of CAD (coronary artery disease), Chronic pain syndrome, Colorectal polyps, COPD (chronic obstructive pulmonary disease) (HCC), CTS (carpal tunnel syndrome), DDD (degenerative disc disease), lumbar, Depression, Family hx of colon cancer, Fibromyalgia, Hyperlipemia, IBS (irritable bowel syndrome), Lumbar spondylosis, New onset type 2 diabetes mellitus ([address] On home O2, Pneumonia, and Urticaria, chronic. Past Surgical History:  has a past surgical history that includes Tympanostomy tube placement; Cervical polyp removal (2004); Carpal tunnel release (Bilateral); Tubal ligation; Intracapsular cataract extraction (Left, 2020);  Intracapsular cataract extraction (Right, 07/14/2020); Coronary angioplasty with stent (07/19/2022); and Cataract extraction. Assessment   Performance deficits / Impairments: Decreased functional mobility ; Decreased ADL status; Decreased ROM; Decreased strength;Decreased endurance;Decreased balance;Decreased high-level IADLs  Assessment: Pt is a 78 y.o. female admitted from SNF with Acute on chronic respiratory failure with hypoxia and hypercapnia, NSTEMI, and necrotizing PNA. Pt recent admit with COPD Exac, ROLY, UTI and Sepsis and d/c'd on 7/13/2022 to Home at [facility] for rehab. At baseline, pt lives with significant other in apartment setting and independent ADLs, IADLs, and fxl mobility. Pt currently functioning below baseline d/t the above deficits, today requiring CGA fxl transfers, CGA/min A fxl mobility with RW,  and anticipate pt would require overall min/mod A for ADLs. Pt easily fatigued with minimal activity and requires rest breaks. Pt with multiple coughing spells throughout session. Pt demonstrates need for ongoing skilled OT at d/c to maximize pt's safety and independence prior to return home.   Prognosis: Good;Fair  REQUIRES OT FOLLOW-UP: Yes  Activity Tolerance  Activity Tolerance: Patient limited by fatigue  Activity Tolerance Comments: Pt initially on 5L O2 and 89-90% at rest, RN in room and bumped up to 7L for therapy and pt 90-92% with activity        Plan   Plan  Times per Week: 3-5  Current Treatment Recommendations: Strengthening, ROM, Balance training, Functional mobility training, Endurance training, Safety education & training, Self-Care / ADL     Restrictions  Restrictions/Precautions  Restrictions/Precautions: Fall Risk  Position Activity Restriction  Other position/activity restrictions: Pt initially on 5L O2 and 89-90% at rest, RN in room and bumped up to 7L for therapy and pt 90-92% with activity    Subjective   General  Chart Reviewed: Yes  Patient assessed for rehabilitation services?: Yes  Additional Pertinent Hx: Per Dr. Grecia Carmona H&P: \"Pt is an 78y.o. year-old female with a history of hyperlipidemia, fibromyalgia, chronic pain syndrome, severe COPD with chronic hypoxic respiratory failure requiring oxygen at 4 L/min via nasal cannula continuously. She presents to the emergency room for evaluation following a 2 to 3-day history of worsening shortness of breath. She was recently treated for Pseudomonas pneumonia and discharged to rehab. EMS reports that she was on a very long oxygen tube when they arrived. She was placed on 6 L nasal cannula and her oxygen saturation improved. In the emergency room she was found to have an abnormal EKG with inverted T waves in V2 through V5. Cardiology was consulted and asked that she be put on a heparin drip. Patient denies any current or recent chest pain. She is being admitted for further evaluation and treatment. \" Pt s/p C on 7/19. 7/27 Pt with rapid response called and transferred to ICU d/t respiratory distress and placed on Bipap. Pt transferred out of ICU 7/28. Family / Caregiver Present: No  Referring Practitioner: Dr. Ryan Mitchell  Diagnosis: Acute on chronic respiratory failure with hypoxia and hypercapnia, NSTEMI, Necrotizing pneumonia  Subjective  Subjective: Pt seen bedside and agreeable to therapy.   General Comment  Comments: Per RN ok for therapy     Social/Functional History  Social/Functional History  Lives With: Significant other (Ray)  Type of Home: Apartment (first floor)  Home Layout: One level (laundry in apt)  Home Access: Level entry  Bathroom Shower/Tub: Tub/Shower unit  Bathroom Toilet: Standard  Bathroom Equipment: Shower chair  Home Equipment: Cane  ADL Assistance: [address] [place] [address] Avenue: Independent (shared with significant other)  Ambulation Assistance: Independent (without AD)  Transfer Assistance: Independent  Active : No  Patient's  Info: significant other drives  Occupation: Retired  Additional Comments: Pt recent admit with COPD Exac, ROLY, UTI and Sepsis and d/c'd on 7/13/2022 to Home at [facility] for rehab. Objective        Safety Devices  Type of Devices: Call light within reach;Nurse notified; Chair alarm in place; Left in chair       Activity Tolerance  Activity Tolerance: Patient limited by endurance  Activity Tolerance Comments: limited by breathing status    Bed mobility  Supine to Sit: Contact guard assistance (HOB elevated)  Sit to Supine:  (pt in chair at end of session)    Pt tolerated sitting EOB ~ 5 min in prep for transfer. Transfers  Sit to stand: Contact guard assistance  Stand to sit: Contact guard assistance  Transfer Comments: Pt took a few steps bed > chair with RW and CGA/min A. Decreased endurance    Vision  Vision: Impaired  Vision Exceptions: Wears glasses for reading  Hearing  Hearing: Within functional limits    Cognition  Overall Cognitive Status: WFL  Orientation  Overall Orientation Status: Within Functional Limits  Orientation Level: Oriented to person;Oriented to situation;Oriented to place;Oriented to time                  Education Given To: Patient  Education Provided: Role of Therapy;Plan of Care;Transfer Training;ADL Adaptive Strategies; Energy Conservation  Barriers to Learning: None  Education Outcome: Verbalized understanding;Demonstrated understanding    LUE AROM (degrees)  LUE AROM : WFL  Left Hand AROM (degrees)  Left Hand AROM: [facility]  RUE AROM (degrees)  RUE AROM : WFL  Right Hand AROM (degrees)  Right Hand AROM: [facility]         AM-PAC Score    AM-[facility] Inpatient Daily Activity Raw Score: 16 (07/29/22 1142)  AM-PAC Inpatient ADL T-Scale Score : 35.96 (07/29/22 1142)  ADL Inpatient CMS 0-100% Score: 53.32 (07/29/22 1142)  ADL Inpatient CMS G-Code Modifier : CK (07/29/22 1142)    Goals  Short Term Goals  Time Frame for Short term goals: Prior to d/c: goals ongoing  Short Term Goal 1: Pt will bathe with SBA.   Short Term Goal 2: Pt will dress with SBA. Short Term Goal 3: Pt will toilet with SBA/supervision. Short Term Goal 4: Pt will complete fxl mobility and fxl transfers to/from ADL surfaces with SBA/supervision using AD. Short Term Goal 5: Pt will tolerate standing >5 minutes for functional task with SBA/supervision while maintaining O2 sats >90% to improve standing tolerance for ADL routine. Long Term Goals  Time Frame for Long term goals : STGs=LTGs  Patient Goals   Patient goals : to finish rehab at eventually return home. Therapy Time   Individual Concurrent Group Co-treatment   Time In 1110         Time Out 1142         Minutes 32         Timed Code Treatment Minutes: 28 Minutes     This note to serve as OT d/c summary if pt is d/c-ed prior to next therapy session.     Billy Da Silva, OTR/L

## 2022-07-29 NOTE — PROGRESS NOTES
Pulmonary Progress Note    Date of Admission: 7/18/2022   LOS: 11 days     Chief Complaint   Patient presents with    Shortness of Breath     At [facility] [place] for rehab for pneumonia increased sob over the last few days. Pt on 4-5 lpm via nc all the time. Per EMS the cord was very long ems placed on 6lpm via nc and o2 sat came up to 99%       Assessment/Plan:     Necrotizing pneumonia  Pseudomonas pneumonia, right upper lobe  -Abscess right upper lobe  -On Merrem/LUCILLE  -ID following    Acute hypoxemic respiratory failure  -Wean supplemental oxygen to goal saturation of >90%    Moderate COPD  -Dulera, Spiriva, albuterol as needed    24 Hour Events/Subjective  Transferred out of the ICU. Remains on 6 L of oxygen. Still with cough but producing less sputum. ROS:   No nausea  No Vomiting  No chest pain      Intake/Output Summary (Last 24 hours) at 7/29/2022 0746  Last data filed at 7/29/2022 0404  Gross per 24 hour   Intake 480 ml   Output 825 ml   Net -345 ml         PHYSICAL EXAM:   Blood pressure (!) 144/79, pulse (!) 110, temperature 98.2 °F (36.8 °C), temperature source Oral, resp. rate 18, height 5' 4\" (1.626 m), weight 139 lb 15.9 oz (63.5 kg), SpO2 94 %, not currently breastfeeding.'  Gen:  No acute distress. Eyes: PERRL. Anicteric sclera. No conjunctival injection. ENT: No discharge. Posterior oropharynx clear. External appearance of ears and nose normal.  Neck: Trachea midline. No mass   Resp:  No crackles. No wheezes. No rhonchi. No dullness on percussion. CV: Regular rate. Regular rhythm. No murmur or rub. No edema. GI: Soft, Non-tender. Non-distended. +BS  Skin: Warm, dry, w/o erythema. Lymph: No cervical or supraclavicular LAD. M/S: No cyanosis. No clubbing. Neuro:  no focal neurologic deficit. Moves all extremities  Psych: Awake and alert, Oriented x 3. Judgement and insight appropriate. Mood stable.       Medications:    Scheduled Meds:   enoxaparin  40 mg SubCUTAneous Daily    meropenem  1,000 mg IntraVENous Q8H    tobramycin (PF)  300 mg Nebulization BID    sodium chloride  500 mL IntraVENous Once    aspirin  81 mg Oral Daily    nystatin  5 mL Oral 4x Daily    prasugrel  10 mg Oral Daily    sodium chloride flush  5-40 mL IntraVENous 2 times per day    atorvastatin  40 mg Oral Nightly    tiZANidine  4 mg Oral Nightly    pantoprazole  40 mg Oral Daily    gabapentin  600 mg Oral BID    DULoxetine  60 mg Oral BID    insulin lispro  0-12 Units SubCUTAneous TID WC    insulin lispro  0-6 Units SubCUTAneous Nightly    mometasone-formoterol  2 puff Inhalation BID    And    tiotropium  2 puff Inhalation Daily       Continuous Infusions:   sodium chloride 5 mL/hr at 07/25/22 0502    dextrose         PRN Meds:  HYDROcodone 5 mg - acetaminophen, sodium chloride flush, sodium chloride, acetaminophen, [Held by provider] oxyCODONE, [Held by provider] HYDROcodone 5 mg - acetaminophen, perflutren lipid microspheres, traZODone, ipratropium-albuterol, cetirizine, albuterol sulfate HFA, glucose, dextrose bolus **OR** dextrose bolus, glucagon (rDNA), dextrose, ondansetron, polyethylene glycol    Labs reviewed:  CBC:   Recent Labs     07/27/22  0358 07/28/22  0500 07/29/22  0540   WBC 6.7 9.4 9.7   HGB 8.9* 8.3* 8.1*   HCT 25.3* 24.0* 23.5*   MCV 90.1 90.8 89.8   * 527* 618*     BMP:   Recent Labs     07/27/22  0358 07/28/22  0500 07/29/22  0540    136 132*   K 3.6 3.7 4.0    99 95*   CO2 28 32 33*   PHOS 3.1 3.1 2.5   BUN 5* 9 11   CREATININE <0.5* <0.5* <0.5*     LIVER PROFILE: No results for input(s): AST, ALT, LIPASE, BILIDIR, BILITOT, ALKPHOS in the last 72 hours. Invalid input(s): AMYLASE,  ALB  PT/INR: No results for input(s): PROTIME, INR in the last 72 hours. APTT: No results for input(s): APTT in the last 72 hours.   UA:  No results for input(s): NITRITE, COLORU, PHUR, LABCAST, WBCUA, RBCUA, MUCUS, TRICHOMONAS, YEAST, BACTERIA, CLARITYU, SPECGRAV, LEUKOCYTESUR, [address] [place], BILIRUBINUR, BLOODU, GLUCOSEU, AMORPHOUS in the last 72 hours. Invalid input(s): [place] Prudent    No results for input(s): PH, PCO2, PO2 in the last 72 hours. Films:  Radiology Review:  Pertinent images / reports were reviewed as a part of this visit. This note was transcribed using GCW. Please disregard any translational errors.     Thank you for this consult,    Geoff Terrazas MD  [facility] Pulmonary, Critical Care, and Sleep Medicine

## 2022-07-29 NOTE — PROGRESS NOTES
Physical Therapy  Facility/Department: [facility] PROGRESSIVE CARE  Physical Therapy Treatment Note  Name: Yanique Meyer  : 1945  MRN: [id number]  Date of Service: 2022    Discharge Recommendations:  Patient would benefit from continued therapy after discharge (3-5x/wk)   PT Equipment Recommendations  Equipment Needed: No  Other: defer to next level of care    Yanique Meyer scored a 16/24 on the AM-PAC short mobility form. Current research shows that an AM-PAC score of 17 or less is typically not associated with a discharge to the patient's home setting. Based on the patient's AM-PAC score and their current functional mobility deficits, it is recommended that the patient have 3-5 sessions per week of Physical Therapy at d/c to increase the patient's independence. Please see assessment section for further patient specific details. If patient discharges prior to next session this note will serve as a discharge summary. Please see below for the latest assessment towards goals. Patient Diagnosis(es): The primary encounter diagnosis was NSTEMI (non-ST elevated myocardial infarction) ([facility] Diagnoses of Pneumonia of right lung due to infectious organism, unspecified part of lung and Hypoxia were also pertinent to this visit. Past Medical History:  has a past medical history of CAD (coronary artery disease), Chronic pain syndrome, Colorectal polyps, COPD (chronic obstructive pulmonary disease) (HCC), CTS (carpal tunnel syndrome), DDD (degenerative disc disease), lumbar, Depression, Family hx of colon cancer, Fibromyalgia, Hyperlipemia, IBS (irritable bowel syndrome), Lumbar spondylosis, New onset type 2 diabetes mellitus ([facility] On home O2, Pneumonia, and Urticaria, chronic. Past Surgical History:  has a past surgical history that includes Tympanostomy tube placement; Cervical polyp removal (2004); Carpal tunnel release (Bilateral); Tubal ligation;  Intracapsular cataract extraction (Left, 06/23/2020); Intracapsular cataract extraction (Right, 07/14/2020); Coronary angioplasty with stent (07/19/2022); and Cataract extraction. Assessment   Body Structures, Functions, Activity Limitations Requiring Skilled Therapeutic Intervention: Decreased functional mobility   Assessment: The pt is a 79 yo female who presented to the ED with 2-3 day h/o of worsening SOB. She was recently treated for pna and was having rehab at [facility]. In the ED she had an abd EKG and cardiology consulted and had a LHC on 7- for a NSTEMI. Troponins elevated 0.70. The pt is from home with boyfriend and prior to having pna was indep in mobility and self-care. The pt has been receiving rehab following dx of pna and has been getting therapy at the [facility]. PMHx: chronic pain syndrome, COPD on home O2, lmb DDD, depression, fibromyalgia, DM       Today, the pt demonstrated that she is functioning well below her baseline and is limited by her need for supplemental O2 and decreasing SpO2 levels with activity; she is weak and has severely decreased activity tolerance and will benefit from con't skilled PT at d/c.  At this time, she would be unsafe for home as she continues to need CGA/min A for mobility tasks and needing 5-7 liters of O2; pt not yet amb household distances due to early fatigue and breathing status  Therapy Prognosis: Fair  Decision Making: Medium Complexity  Barriers to Learning: fatigue  Requires PT Follow-Up: Yes  Activity Tolerance  Activity Tolerance: Patient limited by endurance  Activity Tolerance Comments: limited by breathing status     Plan   Plan  Plan: 3-5 times per week  Current Treatment Recommendations: Strengthening, ROM, Balance training, Functional mobility training, Transfer training, Gait training, Safety education & training, Patient/Caregiver education & training, Equipment evaluation, education, & procurement, Therapeutic activities  Safety Devices  Type of Devices: Call light within reach, Nurse notified, Chair alarm in place, Left in chair     Restrictions  Restrictions/Precautions  Restrictions/Precautions: Fall Risk  Position Activity Restriction  Other position/activity restrictions: Pt initially on 5L O2 and 89-90% at rest, RN in room and bumped up to 7L for therapy and pt 90-92% with activity     Subjective   General  Chart Reviewed: Yes  Additional Pertinent Hx: Per Claire Wilkinson MD H&P on 7-: The pt is a 77 yo female who presented to the ED with 2-3 day h/o of worsening SOB. She was recently treated for pna and was having rehab at [facility]. In the ED she had an abd EKG and cardiology consulted and had a LHC on 7- for a NSTEMI. Troponins elevated 0.70. PMHx: chronic pain syndrome, COPD on home O2, lmb DDD, depression, fibromyalgia, DM  Response To Previous Treatment: Patient with no complaints from previous session.   Family / Caregiver Present: No  Referring Practitioner: Alexander Huggins MD  Referral Date : 07/19/22  Diagnosis: Acute on chronic respiratory failure with hypoxia and hypercapnia, NSTEMI  Follows Commands: Within Functional Limits  Subjective  Subjective: pt agreeable to working with therapy; spO2 in upper 80s with rest on 5 liters so nurse increased to 7 liters with getting up and then remained in low 90s         Social/Functional History  Social/Functional History  Lives With: Significant other (Ray)  Type of Home: Apartment (first floor)  Home Layout: One level (laundry in apt)  Home Access: Level entry  Bathroom Shower/Tub: Tub/Shower unit  Bathroom Toilet: Standard  Bathroom Equipment: Shower chair  Home Equipment: [address]: [address]: Independent (shared with significant other)  Ambulation Assistance: Independent (without AD)  Transfer Assistance: Independent  Active : No  Patient's  Info: significant other drives  Occupation: Retired  Additional Comments: Pt recent admit with COPD Exac, ROLY, UTI and Sepsis and d/c'd on 7/13/2022 to Home at [facility] for rehab. Vision/Hearing  Vision  Vision: Impaired  Vision Exceptions: Wears glasses for reading  Hearing  Hearing: Within functional limits    Cognition   Orientation  Overall Orientation Status: Within Functional Limits  Orientation Level: Oriented to person;Oriented to situation;Oriented to place;Oriented to time  Cognition  Overall Cognitive Status: WFL     Objective   Heart Rate: (!) 108  Heart Rate Source: Monitor  BP: 132/78  BP Location: Right upper arm  BP Method: Manual  Patient Position: Semi fowlers  MAP (Calculated): 96  Resp: 27  SpO2: 90 %  O2 Device: High flow nasal cannula                                Transfers  Sit to Stand: Contact guard assistance;Minimal Assistance  Stand to sit: Contact guard assistance  Ambulation  Surface: level tile  Device: Rolling Walker  Other Apparatus: O2 (7 liters with activity)  Assistance: Contact guard assistance;Minimal assistance  Quality of Gait: slow small steps  Distance: 5' to BS chair  Comments: deferred further amb due to need for increased O2     Balance  Comments: SBA for sitting balance; CGA for standing with RW       Pt's catheter bag was leaking; nurse came in to change the bag    OutComes Score                                                  AM-PAC Score  AM-PAC Inpatient Mobility Raw Score : 16 (07/25/22 1115)  AM-PAC Inpatient T-Scale Score : 40.78 (07/25/22 1115)  Mobility Inpatient CMS 0-100% Score: 54.16 (07/25/22 1115)  Mobility Inpatient CMS G-Code Modifier : CK (07/25/22 1115)          Tinneti Score       Goals  Short Term Goals  Time Frame for Short term goals: upon d/c  Short term goal 1: Bed mobility with sup/mod I. Short term goal 2: Transfers sit <> stand with SBA. Short term goal 3: Ambulate with wh walker 50 feet with CGA.   Short term goal 4: SpO2 levels to stay >90% with activity  Patient Goals   Patient goals : to go home       Education  Patient Education  Education Given To: Patient  Education Provided Comments: reviewed call light and not getting up without assist; reviewed benefits of continued therapy; reviewed walker safety  Education Method: Demonstration;Verbal  Education Outcome: Verbalized understanding      Therapy Time   Individual Concurrent Group Co-treatment   Time In 1107         Time Out 1143         Minutes 36                 THANH GERMAIN PT   Electronically signed by THANH GERMAIN PT on 7/29/2022 at 11:44 AM

## 2022-07-29 NOTE — CARE COORDINATION
Per chart review patient to remain on Merrem every 8 hours thru 8/15/22. VM message left for admissions at Home at [facility] requesting a return call to determine if patient is able to return on Merrem Q8 hours. Awaiting return call.      Tasneem MCNEALN RN  Case Management  [phone]    Electronically signed by Tasneem Mooney RN on 7/29/2022 at 3:35 PM

## 2022-07-29 NOTE — PROGRESS NOTES
Patient is alert and oriented x4, up with walker at home, PT/OT to see this AM, call light within reach, bed/chair alarm on. AM meds complete, patient tolerated well. VSS and WDL. No s/s of distress, no further needs noted at this time.    Electronically signed by Latosha Lambert RN on 7/29/2022 at 10:51 AM

## 2022-07-29 NOTE — CONSULTS
[facility]  CARDIOPULMONARY PHASE I CONSULT        NAME:  Flower Mccall  MEDICAL RECORD NUMBER:  [id number]  AGE: 78 y.o. GENDER: female  : 1945  TODAY'S DATE:  2022    Subjective:     VISIT TYPE: evaluation     ADMITTING PHYSICIAN:  Roman Woods MD     PAST MEDICAL HISTORY        Diagnosis Date    CAD (coronary artery disease) 2022    NSTEMI, PCI LCx    Chronic pain syndrome     Percocet.  Dr. Omi Hyatt    Colorectal polyps     COPD (chronic obstructive [facility] disease) ([facility]     CTS (carpal tunnel syndrome)     BILATERAL    DDD (degenerative disc disease), lumbar     Depression     Family hx of colon cancer     Fibromyalgia     Hyperlipemia     IBS (irritable bowel syndrome)     Lumbar spondylosis     New onset type 2 diabetes mellitus ([facility] 2020    On home O2     4L    Pneumonia 2022    P.aer    Urticaria, chronic        SOCIAL HISTORY    Social History     Tobacco Use    Smoking status: Every Day     Packs/day: 1.00     Years: 55.00     Pack years: 55.00     Types: Cigarettes     Start date: 1962    Smokeless tobacco: Never    Tobacco comments:     almost ready to quit   Vaping Use    Vaping Use: Never used   Substance Use Topics    Alcohol use: No     Alcohol/week: 0.0 standard drinks    Drug use: No       ALLERGIES    No Known Allergies    MEDICATIONS  Scheduled Meds:   enoxaparin  40 mg SubCUTAneous Daily    meropenem  1,000 mg IntraVENous Q8H    tobramycin (PF)  300 mg Nebulization BID    sodium chloride  500 mL IntraVENous Once    aspirin  81 mg Oral Daily    nystatin  5 mL Oral 4x Daily    prasugrel  10 mg Oral Daily    sodium chloride flush  5-40 mL IntraVENous 2 times per day    atorvastatin  40 mg Oral Nightly    tiZANidine  4 mg Oral Nightly    pantoprazole  40 mg Oral Daily    gabapentin  600 mg Oral BID    DULoxetine  60 mg Oral BID    insulin lispro  0-12 Units SubCUTAneous TID WC    insulin lispro  0-6 Units SubCUTAneous Nightly mometasone-formoterol  2 puff Inhalation BID    And    tiotropium  2 puff Inhalation Daily       ADMIT DATE: 7/18/2022      Objective:     ADMISSION DIAGNOSIS:   Hypoxia [R09.02]  NSTEMI (non-ST elevated myocardial infarction) ([facility] [I21.4]  Acute on chronic respiratory failure with hypoxia (HCC) [J96.21]  Pneumonia of right lung due to infectious organism, unspecified part of lung [J18.9]     /78   Pulse (!) 108   Temp 97.9 °F (36.6 °C) (Oral)   Resp 27   Ht 5' 4\" (1.626 m)   Wt 139 lb 15.9 oz (63.5 kg)   SpO2 90%   BMI 24.03 kg/m²     ADMIT:  Weight: 135 lb 9.3 oz (61.5 kg)    TODAY: Weight: 139 lb 15.9 oz (63.5 kg)    Wt Readings from Last 3 Encounters:   07/29/22 139 lb 15.9 oz (63.5 kg)   07/13/22 133 lb 13.1 oz (60.7 kg)   06/20/22 138 lb (62.6 kg)        ECHOCARDIOGRAM:     HgBA1c:  No components found for: HGBA1C  LIPID PANEL:    Lab Results   Component Value Date/Time    CHOL 171 02/17/2022 10:29 AM    HDL 59 02/17/2022 10:29 AM    TRIG 146 02/17/2022 10:29 AM        Assessment:     CONSULTS:   IP CONSULT TO CARDIOLOGY  IP CONSULT TO HOSPITALIST  REQUEST FOR SPIRITUAL ASSESSMENT  IP CONSULT TO SPIRITUAL SERVICES  IP CONSULT TO CARDIAC REHAB  IP CONSULT TO CARDIAC REHAB  IP CONSULT TO PULMONOLOGY  IP CONSULT TO INFECTIOUS DISEASES  IP CONSULT TO CARDIOTHORACIC SURGERY  IP CONSULT TO PALLIATIVE CARE    Patient has a CARDIOLOGY CONSULT: Yes        EDUCATION STATUS: Patient   [x]  Provided both written and verbal education on Cardiopulmonary Rehabilitation. []  Provided instructions for smoking cessation programs. []  Provided education of CAD risk factors. []  Provided recommendations on activity and exercise. []  Provided education on medications. []  Provided education on coronary anatomy and coronary interventions. []  Other:    CURRENT DIET: ADULT DIET; Regular;  Low Sodium (2 gm)  ADULT ORAL NUTRITION SUPPLEMENT; Breakfast, Lunch, Dinner; Standard High Calorie/High Protein Oral Supplement    EDUCATIONAL PACKETS PROVIDED- PRINTED FROM [employer]. Titles and material given:  {Yes No [id number] ) yes  [x]  Managing Heart Disease and Preventing Stroke  []  Heart Owner's Manual  []  Living Well with Heart Failure  []  Other:     PATIENT/CAREGIVER TEACHING:    Level of patient/caregiver understanding able to:   [x] Verbalize understanding   [] Demonstrate understanding       [] Teach back        [] Needs reinforcement     []  Other:       TEACHING TIME:  5 minutes       Plan:       DISCHARGE PLAN:  Placement for patient upon discharge: home with support   Hospice Care:  no  Code Status: Full Code  Discharge appointment scheduled: No     RECOMMENDATIONS:   X  Patient/Family instructed to call Cardiopulmonary Rehab ([phone]) upon discharge schedule the initial evaluation  []  Encourage to call Cardiopulmonary Rehabilitation with any questions. []  Referral to alternate Cardiopulmonary Rehabilitation facility.    []  Other:    [] Appointment scheduled for   [] Chooses to not schedule at this time          Electronically signed by Codie White on 7/29/2022 at 11:13 AM

## 2022-07-29 NOTE — PROGRESS NOTES
Infectious Disease Follow up Notes  Admit Date: 7/18/2022  Hospital Day: 12    Antibiotics :   IV Meropenem  Inhaled Tobramycin     CHIEF COMPLAINT:      Rt UL lung abscess  Severe Necrotizing PNA  Pseudomonas PNA  Hypoxic resp failure      Subjective interval History :  78 y. o.woman with a past medical history of COPD, depression, chronic hypoxic respiratory failure now on   4 L of oxygen via nasal cannula, diabetes recent admission for COPD exacerbation and pneumonia diagnosed to have Pseudomonas based on sputum studies. She was on IV cefepime transition to oral levofloxacin on discharge. She was sent to [address] rehab facility and now admitted because of worsening shortness of breath increased sputum production. Sputum culture again on this admission positive for Pseudomonas with some resistance pattern, it is now become resistant to quinolones. CT chest on this admission with progressive pneumonia right upper lobe cavitary lesion possible lung abscess. Extensive consolidation  see images -  This is a new finding on the CT chest compared to her previous CT chest on 6/20/22. Given the necrotizing pneumonia with Pseudomonas infection and lung abscess formation we are consulted for recommendations. Interval History : Remains on nasal cannula using up to 6 L of oxygen today with some respiratory distress cough ongoing sputum production slowly clearing, feels very weak appetite is still low    Past Medical History:    Past Medical History:   Diagnosis Date    CAD (coronary artery disease) 07/19/2022    NSTEMI, PCI LCx    Chronic pain syndrome     Percocet.  Dr. Art Shearer    Colorectal polyps     COPD (chronic obstructive pulmonary disease) ([facility]     CTS (carpal tunnel syndrome)     BILATERAL    DDD (degenerative disc disease), lumbar     Depression     Family hx of colon cancer     Fibromyalgia     Hyperlipemia     IBS Packs/day: 1.00     Years: 55.00     Pack years: 55.00     Types: Cigarettes     Start date: 1/1/1962    Smokeless tobacco: Never    Tobacco comments:     almost ready to quit   Vaping Use    Vaping Use: Never used   Substance Use Topics    Alcohol use: No     Alcohol/week: 0.0 standard drinks    Drug use: No     Social History     Tobacco Use   Smoking Status Every Day    Packs/day: 1.00    Years: 55.00    Pack years: 55.00    Types: Cigarettes    Start date: 1/1/1962   Smokeless Tobacco Never   Tobacco Comments    almost ready to quit      Family History   Problem Relation Age of Onset    Cancer Father         COLON     Alzheimer's Disease Mother     Heart Disease Brother     Heart Failure Brother     Diabetes Daughter     Diabetes Daughter     Diabetes Daughter           REVIEW OF SYSTEMS:      Constitutional:  negative for fevers, chills, night sweats  Eyes:  negative for blurred vision, eye discharge, visual disturbance   HEENT:  negative for hearing loss, ear drainage,nasal congestion  Respiratory:  r cough++ , shortness of breath + sputum + or hemoptysis  +   Cardiovascular:  negative for chest pain, palpitations, syncope  Gastrointestinal:  negative for nausea, vomiting, diarrhea, constipation, abdominal pain  Genitourinary:  negative for frequency, dysuria, urinary incontinence, hematuria  Hematologic/Lymphatic:  negative for easy bruising, bleeding and lymphadenopathy  Allergic/Immunologic:  negative for recurrent infections, angioedema, anaphylaxis   Endocrine:  negative for weight changes, polyuria, polydipsia and polyphagia  Musculoskeletal:  negative for joint  pain, swelling, decreased range of motion  Integumentary: No rashes, skin lesions  Neurological:  negative for headaches, slurred speech, unilateral weakness  Psychiatric: negative for hallucinations,confusion,agitation.                 PHYSICAL EXAM:      Vitals:    /78   Pulse (!) 108   Temp 97.9 °F (36.6 °C) (Oral)   Resp 27   Ht 5' PM    PROT 6.9 10/12/2012 11:00 AM    BILITOT 0.5 07/18/2022 05:20 PM    LABALBU 2.4 07/18/2022 05:20 PM     UA:  Lab Results   Component Value Date/Time    COLORU Yellow 07/22/2022 06:31 AM    CLARITYU Clear 07/22/2022 06:31 AM    GLUCOSEU Negative 07/22/2022 06:31 AM    BILIRUBINUR Negative 07/22/2022 06:31 AM    BILIRUBINUR neg 10/17/2019 11:50 AM    KETUA TRACE 07/22/2022 06:31 AM    SPECGRAV 1.049 07/22/2022 06:31 AM    BLOODU Negative 07/22/2022 06:31 AM    PHUR 6.5 07/22/2022 06:31 AM    PROTEINU 30 07/22/2022 06:31 AM    UROBILINOGEN 0.2 07/22/2022 06:31 AM    NITRU Negative 07/22/2022 06:31 AM    LEUKOCYTESUR Negative 07/22/2022 06:31 AM    LABMICR YES 07/22/2022 06:31 AM    URINETYPE NotGiven 07/22/2022 06:31 AM      Urine Microscopic:   Lab Results   Component Value Date/Time    LABCAST 10-20 Hyaline 01/10/2017 06:19 PM    BACTERIA None Seen 07/22/2022 06:31 AM    COMU see below 07/03/2022 03:11 PM    HYALCAST 2 07/22/2022 06:31 AM    WBCUA 2 07/22/2022 06:31 AM    RBCUA 5 07/22/2022 06:31 AM    EPIU 1 07/22/2022 06:31 AM     Urine Reflex to Culture:   Lab Results   Component Value Date/Time    URRFLXCULT Not Indicated 07/03/2022 03:11 PM         MICRO: cultures reviewed and updated by me   Blood Culture:          Culture, Respiratory [[id number]] (Abnormal)  Collected: 07/22/22 1200   Order Status: Completed Specimen: Sputum Expectorated Updated: 07/24/22 0801    CULTURE, RESPIRATORY Rare growth normal respiratory fabiana with Abnormal     Gram Stain Result No Epithelial Cells seen   3+ WBC's (Polymorphonuclear)   No organisms seen     Organism Pseudomonas aeruginosa Abnormal     CULTURE, RESPIRATORY Light growth   Narrative:     ORDER#: [id number]                          ORDERED BY: CIRA KEBEDE   SOURCE: Sputum Expectorated                COLLECTED:  07/22/22 12:00   ANTIBIOTICS AT GURWINDER.:                      RECEIVED :  07/22/22 12:22   Respiratory Culture [[id number]] (Abnormal)  Collected: 07/19/22 2030 Order Status: Completed Specimen: Sputum Expectorated Updated: 07/23/22 0748    CULTURE, RESPIRATORY Light growth normal respiratory fabiana with Abnormal     Gram Stain Result 2+ Gram positive cocci   2+ WBC's (Polymorphonuclear)   1+ Epithelial Cells     Organism Pseudomonas aeruginosa Abnormal     CULTURE, RESPIRATORY Light growth   Narrative:     ORDER#: [id number]                          ORDERED BY: FARZANA GAN   SOURCE: Sputum Expectorated                COLLECTED:  07/19/22 20:30   ANTIBIOTICS AT GURWINDER.:                      RECEIVED :  07/19/22 21:19   Culture, Blood 1 [[id number]] Collected: 07/18/22 1841   Order Status: Completed Specimen: Blood Updated: 07/22/22 2015    Blood Culture, Routine No Growth after 4 days of incubation. Narrative:     ORDER#: [id number]                          ORDERED BY: Vikash Nowak   SOURCE: Blood                              COLLECTED:  07/18/22 18:41   ANTIBIOTICS AT GURWINDER.:                      RECEIVED :  07/18/22 18:56   If child <=2 yrs old please draw pediatric bottle. ~Blood Culture 1   Culture, Blood 2 [[id number]] Collected: 07/18/22 1850   Order Status: Completed Specimen: Blood Updated: 07/22/22 2015    Culture, Blood 2 No Growth after 4 days of incubation. Narrative:     ORDER#: [id number]                          ORDERED BY: Vikash Nowak   SOURCE: Blood                              COLLECTED:  07/18/22 18:50   ANTIBIOTICS AT GURWINDER.:                      RECEIVED :  07/18/22 18:56   If child <=2 yrs old please draw pediatric bottle. ~Blood Culture #2   Strep Pneumoniae Antigen [[id number]] Collected: 07/20/22 1020   Order Status: Completed Specimen: Urine, clean catch Updated: 07/21/22 0841    STREP PNEUMONIAE ANTIGEN, URINE --    Presumptive Negative   Presumptive negative suggests no current or recent   pneumococcal infection.  Infection due to Strep pneumoniae   cannot be ruled out since the antigen present in the sample   may be below the detection limit of the test.   Normal Range:Presumptive Negative    Narrative:     ORDER#: [id number]                          ORDERED BY: FARZANA GAN   SOURCE: Urine Clean Catch                  COLLECTED:  07/20/22 10:20   ANTIBIOTICS AT GURWINDER.:                      RECEIVED :  07/20/22 10:27   Legionella antigen, urine [[id number]] Collected: 07/20/22 1020   Order Status: Completed Specimen: Urine, catheter Updated: 07/21/22 0840    L. pneumophila Serogp 1 Ur Ag --    Presumptive Negative   No Legionella pneumophila serogroup 1 antigens detected. A negative result does not exclude infection with   Legionella pneumophila serogroup 1 nor does it rule out   other microbial-caused respiratory infections or   disease caused by other serogroups of   Legionella pneumophila. Normal Range: Presumptive Negative    Narrative:     ORDER#: [id number]                          ORDERED BY: FARZANA GAN   SOURCE: Urine Catheter                     COLLECTED:  07/20/22 10:20   ANTIBIOTICS AT GURWINDER.:                      RECEIVED :  07/20/22 10:28   Sputum gram stain [[id number]] Collected: 07/19/22 2030   Order Status: Canceled Specimen: Sputum Expectorated    Rapid influenza A/B antigens [[id number]] Collected: 07/19/22 0210   Order Status: Completed Specimen: Nares Updated: 07/19/22 0245    Rapid Influenza A Ag Negative    Rapid Influenza B Ag Negative   COVID-19, Rapid [[id number]] Collected: 07/18/22 1825   Order Status: Completed Specimen: Nasopharyngeal Swab Updated: 07/18/22 1854    SARS-CoV-2, NAAT Not Detected    Comment: Rapid NAAT:   Negative results should be treated as presumptive and,   if inconsistent with clinical signs and symptoms or necessary for   patient management, should be tested with an alternative molecular   assay. Negative results do not preclude SARS-CoV-2 infection and   should not be used as the sole basis for patient management decisions.    This test has been authorized by the FDA under an Emergency Use Authorization (EUA) for use by authorized laboratories. Fact sheet for Healthcare Providers:   BuildHer.es   Fact sheet for Patients: BuildHer.es     METHODOLOGY: Isothermal Nucleic Acid Amplification         CULTURE, RESPIRATORY Rare growth normal respiratory fabiana with Abnormal     Gram Stain Result No Epithelial Cells seen   3+ WBC's (Polymorphonuclear)   No organisms seen    Organism Pseudomonas aeruginosa Abnormal     CULTURE, RESPIRATORY Light growth    [email or web address]          Susceptibility      Pseudomonas aeruginosa (1)    Antibiotic Interpretation Microscan  Method Status    cefepime Sensitive 8 mcg/mL BACTERIAL SUSCEPTIBILITY PANEL BY TIERRA     ciprofloxacin Resistant >2 mcg/mL BACTERIAL SUSCEPTIBILITY PANEL BY TIERRA     gentamicin Sensitive <=4 mcg/mL BACTERIAL SUSCEPTIBILITY PANEL BY TIERRA     meropenem Sensitive <=1 mcg/mL BACTERIAL SUSCEPTIBILITY PANEL BY TIERRA     piperacillin-tazobactam Sensitive <=16 mcg/mL BACTERIAL SUSCEPTIBILITY PANEL BY TIERRA     tobramycin Sensitive <=4 mcg/mL BACTERIAL SUSCEPTIBILITY PANEL BY TIERRA          Narrative  Performed by: [facility]  ORDER#: [id number]                          ORDERED BY: CIRA KEBEDE   SOURCE: Sputum Expectorated                COLLECTED:  07/22/22 12:00   ANTIBIOTICS AT GURWINDER.:                      RECEIVED :  07/22/22 12:22           Lab Results   Component Value Date/Time    BC No Growth after 4 days of incubation. 07/18/2022 06:41 PM    BLOODCULT2 No Growth after 4 days of incubation.  07/18/2022 06:50 PM       Respiratory Culture:  Lab Results   Component Value Date/Time    CULTRESP Rare growth normal respiratory fabiana with 07/22/2022 12:00 PM    CULTRESP Light growth 07/22/2022 12:00 PM    LABGRAM  07/22/2022 12:00 PM     No Epithelial Cells seen  3+ WBC's (Polymorphonuclear)  No organisms seen       AFB:No results found for: AFBSMEAR  Viral Culture:  Lab Results   Component Value Date/Time    COVID19 Not Detected 07/18/2022 06:25 PM     Urine Culture: No results for input(s): LABURIN in the last 72 hours. IMAGING:    XR CHEST PORTABLE   Final Result   Stable multifocal airspace disease in the right lung, including probable   pulmonary abscess in the right upper lobe. CT CHEST WO CONTRAST   Preliminary Result   1. Mixed consolidative and ground-glass opacities as well as interstitial   thickening throughout the majority of the right lung compatible with   pneumonia. 2. There is a cavity in the right upper lobe demonstrating an air-fluid level   measuring up to 9.7 cm concerning for abscess formation. 3. Trace right pleural effusion. 4. Prominent and enlarged mediastinal lymph nodes, likely reactive. 5. Stable 6 mm left lower lobe pulmonary nodule. RECOMMENDATIONS:   Fleischner Society guidelines for follow-up and management of incidentally   detected pulmonary nodules:      Single Solid Nodule:      Nodule size less than 6 mm   In a low-risk patient, no routine follow-up. In a high-risk patient, optional CT at 12 months. Nodule size equals 6-8 mm   In a low-risk patient, CT at 6-12 months, then consider CT at 18-24 months. In a high-risk patient, CT at 6-12 months, then CT at 18-24 months. Nodule size greater than 8 mm         In a low-risk patient, consider CT at 3 months, PET/CT, or tissue sampling. In a high-risk patient, consider CT at 3 months, PET/CT, or tissue sampling. Multiple Solid Nodules:      Nodule size less than 6 mm   In a low-risk patient, no routine follow-up. In a high-risk patient, optional CT at 12 months. Nodule size equals 6-8 mm   In a low-risk patient, CT at 3-6 months, then consider CT at 18-24 months. In a high-risk patient, CT at 3-6 months, then CT at 18-24 months. Nodule size greater than 8 mm   In a low-risk patient, CT at 3-6 months, then consider CT at 18-24 months.    In a high-risk patient, CT at 3-6 months, then CT at 18-24 months. - Low risk patients include individuals with minimal or absent history of   smoking and other known risk factors. - High risk patients include individuals with a history or smoking or known   risk factors. Radiology 2017 http://pubs. rsna.org/doi/full/10.1148/radiol. 0363404643         XR CHEST PORTABLE   Final Result   1. Increased pneumonia throughout the right lung remaining most prominent in   the right upper lobe. 2. Emphysema better demonstrated on CT. 3. Possible trace right pleural effusion. XR CHEST PORTABLE   Final Result   Improved aeration of the right chest with persistent consolidation in the mid   to upper right chest.           XR CHEST PORTABLE   Final Result   1. Increased pneumonia throughout the right lung remaining most prominent in   the right upper lobe. 2. Emphysema better demonstrated on CT. 3. Possible trace right pleural effusion. XR CHEST PORTABLE   Final Result   Improved aeration of the right chest with persistent consolidation in the mid   to upper right chest.                All pertinent images and reports for the current Hospitalization were reviewed by me.        Scheduled Meds:   enoxaparin  40 mg SubCUTAneous Daily    meropenem  1,000 mg IntraVENous Q8H    tobramycin (PF)  300 mg Nebulization BID    sodium chloride  500 mL IntraVENous Once    aspirin  81 mg Oral Daily    nystatin  5 mL Oral 4x Daily    prasugrel  10 mg Oral Daily    sodium chloride flush  5-40 mL IntraVENous 2 times per day    atorvastatin  40 mg Oral Nightly    tiZANidine  4 mg Oral Nightly    pantoprazole  40 mg Oral Daily    gabapentin  600 mg Oral BID    DULoxetine  60 mg Oral BID    insulin lispro  0-12 Units SubCUTAneous TID WC    insulin lispro  0-6 Units SubCUTAneous Nightly    mometasone-formoterol  2 puff Inhalation BID    And    tiotropium  2 puff Inhalation Daily       Continuous Infusions:   sodium chloride 5 mL/hr at 07/25/22 0502    dextrose         PRN Meds:  HYDROcodone 5 mg - acetaminophen, sodium chloride flush, sodium chloride, acetaminophen, [Held by provider] oxyCODONE, [Held by provider] HYDROcodone 5 mg - acetaminophen, perflutren lipid microspheres, traZODone, ipratropium-albuterol, cetirizine, albuterol sulfate HFA, glucose, dextrose bolus **OR** dextrose bolus, glucagon (rDNA), dextrose, ondansetron, polyethylene glycol      Assessment:     Patient Active Problem List   Diagnosis    Osteopenia-last dexa 2009 repeat 2015 (-2.4 hip)--advised tx    Colon polyp, hyperplastic,-(done 3/11-repeat 3-5 yrs--dr Medardo David)    Family history of colon cancer    CTS (carpal tunnel syndrome)BILATERAL-s/p surgical repair--resolved     Postmenopausal status-last mammogram 2/15 wnl last pap 12/13--wnl    Nondependent alcohol abuse, in remission    Urticaria, chronic    Smoking    Chronic back pain-(djd) saw dr Marbella Veras afford surgery--seeing dr Felicia Cabot for pain meds    Fibromyalgia    Hypercholesteremia    Lumbar spondylosis    Vitamin D deficiency--severe--started 50,000 iu 2x/ wk 11/13    Insomnia--on elevil w help    Constipation--on daily miralax    Depression    Chronic pain syndrome    Muscle cramping    Acute on chronic respiratory failure with hypercapnia (HCC)    ROLY (acute kidney injury) ([address]    Severe sepsis (HCC)    Gastroesophageal reflux disease    COPD, severe ([address]    Chronic hypoxemic respiratory failure ([address]    Degeneration of lumbar or lumbosacral intervertebral disc    Trochanteric bursitis of right hip    COPD exacerbation ([address]    Diabetes ([address]    Controlled type 2 diabetes mellitus without complication, without long-term current use of insulin ([address]    Age-related osteoporosis without current pathological fracture- started alendronate 9/2021    Pulmonary nodule seen on imaging study    Pneumonia of right lung due to infectious organism    General weakness    Elevated lactic acid repeat CT chest in  3 weeks  Cont supportive care  CT images reviewed see above    Risk for progression and intubation high   Was on  BIPAP for resp failure and worsening resp status now tx to PCU -currently using 6 L of nasal cannula  We will plan to retest a sputum for culture on Monday        Discussed with patient/Family and Nursing   Risk of Complications/Morbidity: High      Illness(es)/ Infection present that pose threat to bodily function. There is potential for severe exacerbation of infection/side effects of treatment. Therapy requires intensive monitoring for antimicrobial agent toxicity. Discussed with patient/Family and Nursing staff     Thanks for allowing me to participate in your patient's care and please call me with any questions or concerns.     Mary Daniels MD  Infectious Disease  [facility] ([facility]) Physician  Phone: [phone]   Fax : [phone]

## 2022-07-30 LAB
ANION GAP SERPL CALCULATED.3IONS-SCNC: 9 MMOL/L (ref 3–16)
BASOPHILS ABSOLUTE: 0 K/UL (ref 0–0.2)
BASOPHILS RELATIVE PERCENT: 0.5 %
BUN BLDV-MCNC: 9 MG/DL (ref 7–20)
CALCIUM SERPL-MCNC: 8.1 MG/DL (ref 8.3–10.6)
CHLORIDE BLD-SCNC: 94 MMOL/L (ref 99–110)
CO2: 31 MMOL/L (ref 21–32)
CREAT SERPL-MCNC: <0.5 MG/DL (ref 0.6–1.2)
EOSINOPHILS ABSOLUTE: 0.2 K/UL (ref 0–0.6)
EOSINOPHILS RELATIVE PERCENT: 2.4 %
GFR AFRICAN AMERICAN: >60
GFR NON-AFRICAN AMERICAN: >60
GLUCOSE BLD-MCNC: 100 MG/DL (ref 70–99)
GLUCOSE BLD-MCNC: 111 MG/DL (ref 70–99)
GLUCOSE BLD-MCNC: 117 MG/DL (ref 70–99)
GLUCOSE BLD-MCNC: 147 MG/DL (ref 70–99)
GLUCOSE BLD-MCNC: 90 MG/DL (ref 70–99)
HCT VFR BLD CALC: 25.7 % (ref 36–48)
HEMOGLOBIN: 8.9 G/DL (ref 12–16)
LYMPHOCYTES ABSOLUTE: 1.3 K/UL (ref 1–5.1)
LYMPHOCYTES RELATIVE PERCENT: 13.5 %
MAGNESIUM: 1.7 MG/DL (ref 1.8–2.4)
MCH RBC QN AUTO: 31.3 PG (ref 26–34)
MCHC RBC AUTO-ENTMCNC: 34.6 G/DL (ref 31–36)
MCV RBC AUTO: 90.5 FL (ref 80–100)
MONOCYTES ABSOLUTE: 1.2 K/UL (ref 0–1.3)
MONOCYTES RELATIVE PERCENT: 12.3 %
NEUTROPHILS ABSOLUTE: 6.7 K/UL (ref 1.7–7.7)
NEUTROPHILS RELATIVE PERCENT: 71.3 %
PDW BLD-RTO: 14.2 % (ref 12.4–15.4)
PERFORMED ON: ABNORMAL
PHOSPHORUS: 3.2 MG/DL (ref 2.5–4.9)
PLATELET # BLD: 624 K/UL (ref 135–450)
PMV BLD AUTO: 6.8 FL (ref 5–10.5)
POTASSIUM REFLEX MAGNESIUM: 4.4 MMOL/L (ref 3.5–5.1)
RBC # BLD: 2.84 M/UL (ref 4–5.2)
SODIUM BLD-SCNC: 134 MMOL/L (ref 136–145)
WBC # BLD: 9.4 K/UL (ref 4–11)

## 2022-07-30 PROCEDURE — 94761 N-INVAS EAR/PLS OXIMETRY MLT: CPT

## 2022-07-30 PROCEDURE — 6360000002 HC RX W HCPCS: Performed by: INTERNAL MEDICINE

## 2022-07-30 PROCEDURE — 2580000003 HC RX 258: Performed by: INTERNAL MEDICINE

## 2022-07-30 PROCEDURE — 87070 CULTURE OTHR SPECIMN AEROBIC: CPT

## 2022-07-30 PROCEDURE — 85025 COMPLETE CBC W/AUTO DIFF WBC: CPT

## 2022-07-30 PROCEDURE — 51702 INSERT TEMP BLADDER CATH: CPT

## 2022-07-30 PROCEDURE — 6370000000 HC RX 637 (ALT 250 FOR IP): Performed by: NURSE PRACTITIONER

## 2022-07-30 PROCEDURE — 94640 AIRWAY INHALATION TREATMENT: CPT

## 2022-07-30 PROCEDURE — 87205 SMEAR GRAM STAIN: CPT

## 2022-07-30 PROCEDURE — 94669 MECHANICAL CHEST WALL OSCILL: CPT

## 2022-07-30 PROCEDURE — 6360000002 HC RX W HCPCS: Performed by: NURSE PRACTITIONER

## 2022-07-30 PROCEDURE — 80048 BASIC METABOLIC PNL TOTAL CA: CPT

## 2022-07-30 PROCEDURE — 84100 ASSAY OF PHOSPHORUS: CPT

## 2022-07-30 PROCEDURE — 83735 ASSAY OF MAGNESIUM: CPT

## 2022-07-30 PROCEDURE — 2060000000 HC ICU INTERMEDIATE R&B

## 2022-07-30 PROCEDURE — 2700000000 HC OXYGEN THERAPY PER DAY

## 2022-07-30 PROCEDURE — 99233 SBSQ HOSP IP/OBS HIGH 50: CPT | Performed by: INTERNAL MEDICINE

## 2022-07-30 PROCEDURE — 6370000000 HC RX 637 (ALT 250 FOR IP): Performed by: INTERNAL MEDICINE

## 2022-07-30 RX ADMIN — NYSTATIN 500000 UNITS: 100000 SUSPENSION ORAL at 07:58

## 2022-07-30 RX ADMIN — ASPIRIN 81 MG: 81 TABLET, COATED ORAL at 07:58

## 2022-07-30 RX ADMIN — PANTOPRAZOLE SODIUM 40 MG: 40 TABLET, DELAYED RELEASE ORAL at 07:58

## 2022-07-30 RX ADMIN — MEROPENEM 1000 MG: 1 INJECTION, POWDER, FOR SOLUTION INTRAVENOUS at 14:00

## 2022-07-30 RX ADMIN — HYDROCODONE BITARTRATE AND ACETAMINOPHEN 1 TABLET: 5; 325 TABLET ORAL at 17:40

## 2022-07-30 RX ADMIN — TOBRAMYCIN 300 MG: 300 SOLUTION RESPIRATORY (INHALATION) at 20:14

## 2022-07-30 RX ADMIN — TRAZODONE HYDROCHLORIDE 100 MG: 100 TABLET ORAL at 20:42

## 2022-07-30 RX ADMIN — GABAPENTIN 600 MG: 300 CAPSULE ORAL at 07:58

## 2022-07-30 RX ADMIN — TIZANIDINE 4 MG: 4 TABLET ORAL at 20:42

## 2022-07-30 RX ADMIN — MOMETASONE FUROATE AND FORMOTEROL FUMARATE DIHYDRATE 2 PUFF: 200; 5 AEROSOL RESPIRATORY (INHALATION) at 20:14

## 2022-07-30 RX ADMIN — SODIUM CHLORIDE, PRESERVATIVE FREE 10 ML: 5 INJECTION INTRAVENOUS at 20:44

## 2022-07-30 RX ADMIN — NYSTATIN 500000 UNITS: 100000 SUSPENSION ORAL at 17:40

## 2022-07-30 RX ADMIN — ENOXAPARIN SODIUM 40 MG: 100 INJECTION SUBCUTANEOUS at 07:58

## 2022-07-30 RX ADMIN — ACETAMINOPHEN 650 MG: 325 TABLET ORAL at 20:42

## 2022-07-30 RX ADMIN — TOBRAMYCIN 300 MG: 300 SOLUTION RESPIRATORY (INHALATION) at 09:13

## 2022-07-30 RX ADMIN — NYSTATIN 500000 UNITS: 100000 SUSPENSION ORAL at 20:42

## 2022-07-30 RX ADMIN — DULOXETINE HYDROCHLORIDE 60 MG: 60 CAPSULE, DELAYED RELEASE ORAL at 20:42

## 2022-07-30 RX ADMIN — HYDROCODONE BITARTRATE AND ACETAMINOPHEN 1 TABLET: 5; 325 TABLET ORAL at 05:32

## 2022-07-30 RX ADMIN — MEROPENEM 1000 MG: 1 INJECTION, POWDER, FOR SOLUTION INTRAVENOUS at 21:42

## 2022-07-30 RX ADMIN — MEROPENEM 1000 MG: 1 INJECTION, POWDER, FOR SOLUTION INTRAVENOUS at 05:39

## 2022-07-30 RX ADMIN — NYSTATIN 500000 UNITS: 100000 SUSPENSION ORAL at 13:59

## 2022-07-30 RX ADMIN — PRASUGREL 10 MG: 10 TABLET, FILM COATED ORAL at 07:58

## 2022-07-30 RX ADMIN — TIOTROPIUM BROMIDE INHALATION SPRAY 2 PUFF: 3.12 SPRAY, METERED RESPIRATORY (INHALATION) at 09:13

## 2022-07-30 RX ADMIN — MOMETASONE FUROATE AND FORMOTEROL FUMARATE DIHYDRATE 2 PUFF: 200; 5 AEROSOL RESPIRATORY (INHALATION) at 09:12

## 2022-07-30 RX ADMIN — DULOXETINE HYDROCHLORIDE 60 MG: 60 CAPSULE, DELAYED RELEASE ORAL at 07:58

## 2022-07-30 RX ADMIN — ATORVASTATIN CALCIUM 40 MG: 40 TABLET, FILM COATED ORAL at 20:42

## 2022-07-30 RX ADMIN — HYDROCODONE BITARTRATE AND ACETAMINOPHEN 1 TABLET: 5; 325 TABLET ORAL at 11:43

## 2022-07-30 RX ADMIN — GABAPENTIN 600 MG: 300 CAPSULE ORAL at 20:42

## 2022-07-30 RX ADMIN — SODIUM CHLORIDE, PRESERVATIVE FREE 10 ML: 5 INJECTION INTRAVENOUS at 07:59

## 2022-07-30 RX ADMIN — INSULIN LISPRO 1 UNITS: 100 INJECTION, SOLUTION INTRAVENOUS; SUBCUTANEOUS at 20:45

## 2022-07-30 ASSESSMENT — PAIN DESCRIPTION - LOCATION: LOCATION: BACK

## 2022-07-30 ASSESSMENT — PAIN SCALES - GENERAL
PAINLEVEL_OUTOF10: 7
PAINLEVEL_OUTOF10: 9
PAINLEVEL_OUTOF10: 7

## 2022-07-30 NOTE — PLAN OF CARE
Problem: Discharge Planning  Goal: Discharge to home or other facility with appropriate resources  Outcome: Progressing  Flowsheets (Taken 7/29/2022 2000)  Discharge to home or other facility with appropriate resources: Identify barriers to discharge with patient and caregiver     Problem: Pain  Goal: Verbalizes/displays adequate comfort level or baseline comfort level  Outcome: Progressing     Problem: Confusion  Goal: Confusion, delirium, dementia, or psychosis is improved or at baseline  Description: INTERVENTIONS:  1. Assess for possible contributors to thought disturbance, including medications, impaired vision or hearing, underlying metabolic abnormalities, dehydration, psychiatric diagnoses, and notify attending LIP  2. [place] high risk fall precautions, as indicated  3. Provide frequent short contacts to provide reality reorientation, refocusing and direction  4. Decrease environmental stimuli, including noise as appropriate  5. Monitor and intervene to maintain adequate nutrition, hydration, elimination, sleep and activity  6. If unable to ensure safety without constant attention obtain sitter and review sitter guidelines with assigned personnel  7. Initiate Psychosocial CNS and Spiritual Care consult, as indicated  Outcome: Progressing     Problem: Skin/Tissue Integrity  Goal: Absence of new skin breakdown  Description: 1. Monitor for areas of redness and/or skin breakdown  2. Assess vascular access sites hourly  3. Every 4-6 hours minimum:  Change oxygen saturation probe site  4. Every 4-6 hours:  If on nasal continuous positive airway pressure, respiratory therapy assess nares and determine need for appliance change or resting period.   Outcome: Progressing     Problem: Safety - Adult  Goal: Free from fall injury  Outcome: Progressing     Problem: Chronic Conditions and Co-morbidities  Goal: Patient's chronic conditions and co-morbidity symptoms are monitored and maintained or improved  Recent

## 2022-07-30 NOTE — PROGRESS NOTES
Hospitalist Progress Note      PCP: Dayna Saleh APRN - CNP    Date of Admission: 7/18/2022    Chief Complaint: respiratory distress    Hospital Course:      Subjective: Weaned to 4 and half liters oxygen. Overall improving      Medications:  Reviewed    Infusion Medications    sodium chloride 5 mL/hr at 07/25/22 0502    dextrose       Scheduled Medications    enoxaparin  40 mg SubCUTAneous Daily    meropenem  1,000 mg IntraVENous Q8H    tobramycin (PF)  300 mg Nebulization BID    sodium chloride  500 mL IntraVENous Once    aspirin  81 mg Oral Daily    nystatin  5 mL Oral 4x Daily    prasugrel  10 mg Oral Daily    sodium chloride flush  5-40 mL IntraVENous 2 times per day    atorvastatin  40 mg Oral Nightly    tiZANidine  4 mg Oral Nightly    pantoprazole  40 mg Oral Daily    gabapentin  600 mg Oral BID    DULoxetine  60 mg Oral BID    insulin lispro  0-12 Units SubCUTAneous TID WC    insulin lispro  0-6 Units SubCUTAneous Nightly    mometasone-formoterol  2 puff Inhalation BID    And    tiotropium  2 puff Inhalation Daily     PRN Meds: HYDROcodone 5 mg - acetaminophen, sodium chloride flush, sodium chloride, acetaminophen, [Held by provider] oxyCODONE, [Held by provider] HYDROcodone 5 mg - acetaminophen, perflutren lipid microspheres, traZODone, ipratropium-albuterol, cetirizine, albuterol sulfate HFA, glucose, dextrose bolus **OR** dextrose bolus, glucagon (rDNA), dextrose, ondansetron, polyethylene glycol      Intake/Output Summary (Last 24 hours) at 7/30/2022 1630  Last data filed at 7/30/2022 1130  Gross per 24 hour   Intake 360 ml   Output 1375 ml   Net -1015 ml         Exam:    /73   Pulse (!) 104   Temp 98.8 °F (37.1 °C) (Oral)   Resp 20   Ht 5' 4\" (1.626 m)   Wt 143 lb 15.4 oz (65.3 kg)   SpO2 96%   BMI 24.71 kg/m²     General appearance: Breathing comfortably on Bipap appears stated age and cooperative. HEENT: Pupils equal, round, and reactive to light. Conjunctivae/corneas clear. Neck: Supple, with full range of motion. No jugular venous distention. Trachea midline. Respiratory:  Comfortably on Bipap. Clear to auscultation, bilaterally without Rales/Wheezes/Rhonchi. Cardiovascular: Regular rate and rhythm with normal S1/S2 without murmurs, rubs or gallops. Abdomen: Soft, non-tender, non-distended with normal bowel sounds. Musculoskeletal: No clubbing, cyanosis or edema bilaterally. Full range of motion without deformity. Skin: Skin color, texture, turgor normal.  No rashes or lesions. Neurologic:  Neurovascularly intact without any focal sensory/motor deficits. Cranial nerves: II-XII intact, grossly non-focal.  Psychiatric: Alert and oriented, thought content appropriate, normal insight  Capillary Refill: Brisk,< 3 seconds   Peripheral Pulses: +2 palpable, equal bilaterally       Labs:   Recent Labs     07/28/22  0500 07/29/22  0540 07/30/22  0540   WBC 9.4 9.7 9.4   HGB 8.3* 8.1* 8.9*   HCT 24.0* 23.5* 25.7*   * 618* 624*       Recent Labs     07/28/22  0500 07/29/22  0540 07/30/22  0540    132* 134*   K 3.7 4.0 4.4   CL 99 95* 94*   CO2 32 33* 31   BUN 9 11 9   CREATININE <0.5* <0.5* <0.5*   CALCIUM 7.9* 7.8* 8.1*   PHOS 3.1 2.5 3.2       No results for input(s): AST, ALT, BILIDIR, BILITOT, ALKPHOS in the last 72 hours. No results for input(s): INR in the last 72 hours. No results for input(s): Lila Kaska in the last 72 hours. Urinalysis:      Lab Results   Component Value Date/Time    NITRU Negative 07/22/2022 06:31 AM    WBCUA 2 07/22/2022 06:31 AM    BACTERIA None Seen 07/22/2022 06:31 AM    RBCUA 5 07/22/2022 06:31 AM    BLOODU Negative 07/22/2022 06:31 AM    SPECGRAV 1.049 07/22/2022 06:31 AM    GLUCOSEU Negative 07/22/2022 06:31 AM       Radiology:  XR CHEST PORTABLE   Final Result   Stable multifocal airspace disease in the right lung, including probable   pulmonary abscess in the right upper lobe.          CT CHEST WO CONTRAST   Final Result   1. Mixed consolidative and ground-glass opacities as well as interstitial   thickening throughout the majority of the right lung compatible with   pneumonia. 2. There is a cavity in the right upper lobe demonstrating an air-fluid level   measuring up to 9.7 cm concerning for abscess formation. 3. Trace right pleural effusion. 4. Prominent and enlarged mediastinal lymph nodes, likely reactive. 5. Stable 6 mm left lower lobe pulmonary nodule. RECOMMENDATIONS:   Fleischner Society guidelines for follow-up and management of incidentally   detected pulmonary nodules:      Single Solid Nodule:      Nodule size less than 6 mm   In a low-risk patient, no routine follow-up. In a high-risk patient, optional CT at 12 months. Nodule size equals 6-8 mm   In a low-risk patient, CT at 6-12 months, then consider CT at 18-24 months. In a high-risk patient, CT at 6-12 months, then CT at 18-24 months. Nodule size greater than 8 mm         In a low-risk patient, consider CT at 3 months, PET/CT, or tissue sampling. In a high-risk patient, consider CT at 3 months, PET/CT, or tissue sampling. Multiple Solid Nodules:      Nodule size less than 6 mm   In a low-risk patient, no routine follow-up. In a high-risk patient, optional CT at 12 months. Nodule size equals 6-8 mm   In a low-risk patient, CT at 3-6 months, then consider CT at 18-24 months. In a high-risk patient, CT at 3-6 months, then CT at 18-24 months. Nodule size greater than 8 mm   In a low-risk patient, CT at 3-6 months, then consider CT at 18-24 months. In a high-risk patient, CT at 3-6 months, then CT at 18-24 months. - Low risk patients include individuals with minimal or absent history of   smoking and other known risk factors. - High risk patients include individuals with a history or smoking or known   risk factors.       Radiology 2017 http://pubs. rsna.org/doi/full/10.1148/radiol. 8329237720         XR CHEST PORTABLE   Final Result   1. Increased pneumonia throughout the right lung remaining most prominent in   the right upper lobe. 2. Emphysema better demonstrated on CT. 3. Possible trace right pleural effusion. XR CHEST PORTABLE   Final Result   Improved aeration of the right chest with persistent consolidation in the mid   to upper right chest.         XR CHEST PORTABLE    (Results Pending)           Assessment/Plan:    Active Hospital Problems    Diagnosis Date Noted    NSTEMI (non-ST elevated myocardial infarction) ([facility] [address] [I21.4] 07/26/2022     Priority: Medium    Necrotizing pneumonia ([facility] [address] [J85.0] 07/26/2022     Priority: Medium    Abscess of upper lobe of right lung with pneumonia ([address] [J85.1] 07/26/2022     Priority: Medium    Pseudomonas respiratory infection [J98.8, B96.5] 07/26/2022     Priority: Medium    Abnormal CT of the chest [R93.89] 07/26/2022     Priority: Medium    Acute on chronic respiratory failure with hypoxia and hypercapnia (HCC) [J96.21, J96.22] 07/18/2022     Priority: Medium    Abnormal EKG [R94.31] 07/18/2022     Priority: Medium    Chronic obstructive pulmonary disease ([address] [J44.9]      Priority: Medium    Pneumonia of right lung due to infectious organism [J18.9] 07/03/2022     Priority: Medium    COPD, severe ([facility] [address] [J44.9] 02/24/2017    Chronic pain syndrome [G89.4] 03/14/2016    Hypercholesteremia [E78.00] 11/04/2013    Fibromyalgia [M79.7]     Smoking [F17.200] 08/27/2011     Acute on chronic respiratory failure with hypoxia and hypercapnia (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  Will require proloniged abx for abscess and close follow up visits from NH to doctors offices  Pulmonary and ID following  CT surgery consulted:  would need pneumonectomy for which she is a poor candidate --> medical therapy only  Respiratory sputum culture repeated on 7/30     Critical coronary artery disease single-vessel  Status post PCI to left circumflex by Dr Babita Cerrato on this admission  with stent  Now on antiplatelet therapy     Anemia  No evidence of bleeding  Suspect anemia of chronic disease  Will check iron studies TIBC ferritin % and reticulocyte count  No indication to transfuse at this point  ? Benefit to EPO     COPD, severe ([address] - without acute exacerbation. Will provide Nebulizer treatments as needed and continue home medications. Patient will be monitored closely, and deep breathing and coughing will be encouraged while awake. Hyperlipidemia - No current evidence of Rhabdomyolysis or other adverse effects. Continue statin therapy while in the hospital     Chronic pain syndrome -continue her home pain med regimen     Fibromyalgia - provide supportive care    DVT Prophylaxis: heparin  Diet: ADULT DIET; Regular;  Low Sodium (2 gm)  ADULT ORAL NUTRITION SUPPLEMENT; Breakfast, Lunch, Dinner; Standard High Calorie/High Protein Oral Supplement  Code Status: Full Code    PT/OT Eval Status: evaluating    Dispo -PCU, anticipate discharge on Monday after repeat sputum culture    Divina Boston MD

## 2022-07-30 NOTE — PROGRESS NOTES
Infectious Disease Follow up Notes  Admit Date: 7/18/2022  Hospital Day: 13    Antibiotics :   IV Meropenem  Inhaled Tobramycin     CHIEF COMPLAINT:      Rt UL lung abscess  Severe Necrotizing PNA  Pseudomonas PNA  Hypoxic resp failure      Subjective interval History :  78 y. o.woman with a past medical history of COPD, depression, chronic hypoxic respiratory failure now on   4 L of oxygen via nasal cannula, diabetes recent admission for COPD exacerbation and pneumonia diagnosed to have Pseudomonas based on sputum studies. She was on IV cefepime transition to oral levofloxacin on discharge. She was sent to [address] rehab facility and now admitted because of worsening shortness of breath increased sputum production. Sputum culture again on this admission positive for Pseudomonas with some resistance pattern, it is now become resistant to quinolones. CT chest on this admission with progressive pneumonia right upper lobe cavitary lesion possible lung abscess. Extensive consolidation  see images -  This is a new finding on the CT chest compared to her previous CT chest on 6/20/22. Given the necrotizing pneumonia with Pseudomonas infection and lung abscess formation we are consulted for recommendations. Interval History : Remains on nasal cannula able to go down to 5 L today coughing spells and sputum production going down and sitting up in the chair feels weak and tired. Tolerating tobramycin inhalation therapy. ok  Discussed with respiratory therapist during rounds      Past Medical History:    Past Medical History:   Diagnosis Date    CAD (coronary artery disease) 07/19/2022    NSTEMI, PCI LCx    Chronic pain syndrome     Percocet.  Dr. Lianne Santos    Colorectal polyps     COPD (chronic obstructive pulmonary disease) ([address]     CTS (carpal tunnel syndrome)     BILATERAL    DDD (degenerative disc disease), lumbar     Depression Family hx of colon cancer     Fibromyalgia     Hyperlipemia     IBS (irritable bowel syndrome)     Lumbar spondylosis     New onset type 2 diabetes mellitus ([facility] 02/03/2020    On home O2     4L    Pneumonia 07/2022    P.aer    Urticaria, chronic        Past Surgical History:    Past Surgical History:   Procedure Laterality Date    CARPAL TUNNEL RELEASE Bilateral     CATARACT EXTRACTION      CERVICAL POLYP REMOVAL  09/2004    CORONARY ANGIOPLASTY WITH STENT PLACEMENT  07/19/2022    LCx 99. PCI/stent. INTRACAPSULAR CATARACT EXTRACTION Left 06/23/2020    Phacoemulsification with intraocular lens implant performed by Starr Woods MD at [address] Right 07/14/2020    Phacoemulsification with intraocular lens implant performed by Starr Woods MD at [address]      patient denies        Current Medications:    No outpatient medications have been marked as taking for the 7/18/22 encounter [facility] Encounter). Allergies:  Patient has no known allergies.     Immunizations :   Immunization History   Administered Date(s) Administered    COVID-19, MODERNA BLUE border, Primary or Immunocompromised, (age 12y+), IM, 100 mcg/0.5mL 03/08/2021, 04/05/2021    COVID-19, MODERNA Booster BLUE border, (age 18y+), IM, 50mcg/0.25mL 12/01/2021    Influenza Vaccine, unspecified formulation 01/10/2017    Influenza, High Dose (Fluzone 65 yrs and older) 11/04/2013, 01/21/2016, 09/01/2017, 10/30/2018, 09/18/2020    Influenza, [place], adjuvanted, 65 yrs +, IM, PF (Fluad) 09/27/2021    Influenza, Triv, inactivated, subunit, adjuvanted, IM (Fluad 65 yrs and older) 09/13/2019    Pneumococcal Conjugate 13-valent ([id number]) 01/19/2015    Pneumococcal Conjugate Vaccine 01/10/2017    Pneumococcal Polysaccharide ([id number]) 10/12/2012    Tdap (Boostrix, Adacel) 07/17/2007    Zoster Recombinant (Shingrix) 11/21/2019       Social 123/75   Pulse 93   Temp 98.6 °F (37 °C) (Oral)   Resp 18   Ht 5' 4\" (1.626 m)   Wt 143 lb 15.4 oz (65.3 kg)   SpO2 95%   BMI 24.71 kg/m²     General Appearance: alert,in some  acute distress, ++  pallor, no icterus  on BIPAP ,   skin changes from smoking ++   Skin: warm and dry, no rash or erythema  Head: normocephalic and atraumatic  Eyes: pupils equal, round, and reactive to light, conjunctivae normal  ENT: tympanic membrane, external ear and ear canal normal bilaterally, nose without deformity, nasal mucosa and turbinates normal without polyps  Neck: supple and non-tender without mass, no thyromegaly  no cervical lymphadenopathy  Pulmonary/Chest:  Rt UL coarse crepts+ BRONCHIAL BREATHING + =-   wheezes, rales or rhonchi, normal air movement, in some respiratory distress  Cardiovascular: normal rate, regular rhythm, normal S1 and S2, no murmurs, rubs, clicks, or gallops, no carotid bruits  Abdomen: soft, non-tender, non-distended, normal bowel sounds, no masses or organomegaly  Extremities: no cyanosis, clubbing or edema  Musculoskeletal: normal range of motion, no joint swelling, deformity or tenderness  Integumentary: No rashes, no abnormal skin lesions, no petechiae  Neurologic: reflexes normal and symmetric, no cranial nerve deficit  Psych:  Orientation, sensorium, mood normal            Lines: PICC     Data Review:    CBC:   Lab Results   Component Value Date    WBC 9.4 07/30/2022    HGB 8.9 (L) 07/30/2022    HCT 25.7 (L) 07/30/2022    MCV 90.5 07/30/2022     (H) 07/30/2022     RENAL:   Lab Results   Component Value Date    CREATININE <0.5 (L) 07/30/2022    BUN 9 07/30/2022     (L) 07/30/2022    K 4.4 07/30/2022    CL 94 (L) 07/30/2022    CO2 31 07/30/2022     SED RATE: No results found for: SEDRATE  CK: No results found for: CKTOTAL  CRP: No results found for: CRP  Hepatic Function Panel:   Lab Results   Component Value Date/Time    ALKPHOS 88 07/18/2022 05:20 PM    ALT 65 07/18/2022 05:20 PM    AST 61 07/18/2022 05:20 PM    PROT 5.6 07/18/2022 05:20 PM    PROT 6.9 10/12/2012 11:00 AM    BILITOT 0.5 07/18/2022 05:20 PM    LABALBU 2.4 07/18/2022 05:20 PM     UA:  Lab Results   Component Value Date/Time    COLORU Yellow 07/22/2022 06:31 AM    CLARITYU Clear 07/22/2022 06:31 AM    GLUCOSEU Negative 07/22/2022 06:31 AM    BILIRUBINUR Negative 07/22/2022 06:31 AM    BILIRUBINUR neg 10/17/2019 11:50 AM    KETUA TRACE 07/22/2022 06:31 AM    SPECGRAV 1.049 07/22/2022 06:31 AM    BLOODU Negative 07/22/2022 06:31 AM    PHUR 6.5 07/22/2022 06:31 AM    PROTEINU 30 07/22/2022 06:31 AM    UROBILINOGEN 0.2 07/22/2022 06:31 AM    NITRU Negative 07/22/2022 06:31 AM    LEUKOCYTESUR Negative 07/22/2022 06:31 AM    LABMICR YES 07/22/2022 06:31 AM    URINETYPE NotGiven 07/22/2022 06:31 AM      Urine Microscopic:   Lab Results   Component Value Date/Time    LABCAST 10-20 Hyaline 01/10/2017 06:19 PM    BACTERIA None Seen 07/22/2022 06:31 AM    COMU see below 07/03/2022 03:11 PM    HYALCAST 2 07/22/2022 06:31 AM    WBCUA 2 07/22/2022 06:31 AM    RBCUA 5 07/22/2022 06:31 AM    EPIU 1 07/22/2022 06:31 AM     Urine Reflex to Culture:   Lab Results   Component Value Date/Time    URRFLXCULT Not Indicated 07/03/2022 03:11 PM         MICRO: cultures reviewed and updated by me   Blood Culture:          Culture, Respiratory [[id number]] (Abnormal)  Collected: 07/22/22 1200   Order Status: Completed Specimen: Sputum Expectorated Updated: 07/24/22 0801    CULTURE, RESPIRATORY Rare growth normal respiratory fabiana with Abnormal     Gram Stain Result No Epithelial Cells seen   3+ WBC's (Polymorphonuclear)   No organisms seen     Organism Pseudomonas aeruginosa Abnormal     CULTURE, RESPIRATORY Light growth   Narrative:     ORDER#: [id number]                          ORDERED BY: CIRA KEBEDE   SOURCE: Sputum Expectorated                COLLECTED:  07/22/22 12:00   ANTIBIOTICS AT GURWINDER.:                      RECEIVED :  07/22/22 12:22 Respiratory Culture [[id number]] (Abnormal)  Collected: 07/19/22 2030   Order Status: Completed Specimen: Sputum Expectorated Updated: 07/23/22 0748    CULTURE, RESPIRATORY Light growth normal respiratory fabiana with Abnormal     Gram Stain Result 2+ Gram positive cocci   2+ WBC's (Polymorphonuclear)   1+ Epithelial Cells     Organism Pseudomonas aeruginosa Abnormal     CULTURE, RESPIRATORY Light growth   Narrative:     ORDER#: [id number]                          ORDERED BY: FARZANA GAN   SOURCE: Sputum Expectorated                COLLECTED:  07/19/22 20:30   ANTIBIOTICS AT GURWINDER.:                      RECEIVED :  07/19/22 21:19   Culture, Blood 1 [[id number]] Collected: 07/18/22 1841   Order Status: Completed Specimen: Blood Updated: 07/22/22 2015    Blood Culture, Routine No Growth after 4 days of incubation. Narrative:     ORDER#: [id number]                          ORDERED BY: Olya Miller   SOURCE: Blood                              COLLECTED:  07/18/22 18:41   ANTIBIOTICS AT GURWINDER.:                      RECEIVED :  07/18/22 18:56   If child <=2 yrs old please draw pediatric bottle. ~Blood Culture 1   Culture, Blood 2 [[id number]] Collected: 07/18/22 1850   Order Status: Completed Specimen: Blood Updated: 07/22/22 2015    Culture, Blood 2 No Growth after 4 days of incubation. Narrative:     ORDER#: [id number]                          ORDERED BY: Olya Miller   SOURCE: Blood                              COLLECTED:  07/18/22 18:50   ANTIBIOTICS AT GURWINDER.:                      RECEIVED :  07/18/22 18:56   If child <=2 yrs old please draw pediatric bottle. ~Blood Culture #2   Strep Pneumoniae Antigen [[id number]] Collected: 07/20/22 1020   Order Status: Completed Specimen: Urine, clean catch Updated: 07/21/22 0841    STREP PNEUMONIAE ANTIGEN, URINE --    Presumptive Negative   Presumptive negative suggests no current or recent   pneumococcal infection.  Infection due to Strep pneumoniae   cannot be ruled out since the antigen present in the sample   may be below the detection limit of the test.   Normal Range:Presumptive Negative    Narrative:     ORDER#: [id number]                          ORDERED BY: FARZANA GAN   SOURCE: Urine Clean Catch                  COLLECTED:  07/20/22 10:20   ANTIBIOTICS AT GURWINDER.:                      RECEIVED :  07/20/22 10:27   Legionella antigen, urine [[id number]] Collected: 07/20/22 1020   Order Status: Completed Specimen: Urine, catheter Updated: 07/21/22 0840    L. pneumophila Serogp 1 Ur Ag --    Presumptive Negative   No Legionella pneumophila serogroup 1 antigens detected. A negative result does not exclude infection with   Legionella pneumophila serogroup 1 nor does it rule out   other microbial-caused respiratory infections or   disease caused by other serogroups of   Legionella pneumophila. Normal Range: Presumptive Negative    Narrative:     ORDER#: [id number]                          ORDERED BY: FARZANA GAN   SOURCE: Urine Catheter                     COLLECTED:  07/20/22 10:20   ANTIBIOTICS AT GURWINDER.:                      RECEIVED :  07/20/22 10:28   Sputum gram stain [[id number]] Collected: 07/19/22 2030   Order Status: Canceled Specimen: Sputum Expectorated    Rapid influenza A/B antigens [[id number]] Collected: 07/19/22 0210   Order Status: Completed Specimen: Nares Updated: 07/19/22 0245    Rapid Influenza A Ag Negative    Rapid Influenza B Ag Negative   COVID-19, Rapid [[id number]] Collected: 07/18/22 1825   Order Status: Completed Specimen: Nasopharyngeal Swab Updated: 07/18/22 1854    SARS-CoV-2, NAAT Not Detected    Comment: Rapid NAAT:   Negative results should be treated as presumptive and,   if inconsistent with clinical signs and symptoms or necessary for   patient management, should be tested with an alternative molecular   assay.  Negative results do not preclude SARS-CoV-2 infection and   should not be used as the sole basis for patient management decisions. This test has been authorized by the FDA under an Emergency Use   Authorization (EUA) for use by authorized laboratories. Fact sheet for Healthcare Providers:   BuildHer.es   Fact sheet for Patients: Maykel.es     METHODOLOGY: Isothermal Nucleic Acid Amplification         CULTURE, RESPIRATORY Rare growth normal respiratory fabiana with Abnormal     Gram Stain Result No Epithelial Cells seen   3+ WBC's (Polymorphonuclear)   No organisms seen    Organism Pseudomonas aeruginosa Abnormal     CULTURE, RESPIRATORY Light growth    Resulting Agency [facility]          Susceptibility      Pseudomonas aeruginosa (1)    Antibiotic Interpretation Microscan  Method Status    cefepime Sensitive 8 mcg/mL BACTERIAL SUSCEPTIBILITY PANEL BY TIERRA     ciprofloxacin Resistant >2 mcg/mL BACTERIAL SUSCEPTIBILITY PANEL BY TIERRA     gentamicin Sensitive <=4 mcg/mL BACTERIAL SUSCEPTIBILITY PANEL BY TIERRA     meropenem Sensitive <=1 mcg/mL BACTERIAL SUSCEPTIBILITY PANEL BY TIERRA     piperacillin-tazobactam Sensitive <=16 mcg/mL BACTERIAL SUSCEPTIBILITY PANEL BY TIERRA     tobramycin Sensitive <=4 mcg/mL BACTERIAL SUSCEPTIBILITY PANEL BY TIERRA          Narrative  Performed by: [facility]  ORDER#: [id number]                          ORDERED BY: CIRA KEBEDE   SOURCE: Sputum Expectorated                COLLECTED:  07/22/22 12:00   ANTIBIOTICS AT GURWINDER.:                      RECEIVED :  07/22/22 12:22           Lab Results   Component Value Date/Time    BC No Growth after 4 days of incubation. 07/18/2022 06:41 PM    BLOODCULT2 No Growth after 4 days of incubation.  07/18/2022 06:50 PM       Respiratory Culture:  Lab Results   Component Value Date/Time    CULTRESP Rare growth normal respiratory fabiana with 07/22/2022 12:00 PM    CULTRESP Light growth 07/22/2022 12:00 PM    LABGRAM  07/22/2022 12:00 PM     No Epithelial Cells seen  3+ WBC's (Polymorphonuclear)  No low-risk patient, CT at 3-6 months, then consider CT at 18-24 months. In a high-risk patient, CT at 3-6 months, then CT at 18-24 months. - Low risk patients include individuals with minimal or absent history of   smoking and other known risk factors. - High risk patients include individuals with a history or smoking or known   risk factors. Radiology 2017 http://pubs. rsna.org/doi/full/10.1148/radiol. 8924528627         XR CHEST PORTABLE   Final Result   1. Increased pneumonia throughout the right lung remaining most prominent in   the right upper lobe. 2. Emphysema better demonstrated on CT. 3. Possible trace right pleural effusion. XR CHEST PORTABLE   Final Result   Improved aeration of the right chest with persistent consolidation in the mid   to upper right chest.         XR CHEST PORTABLE    (Results Pending)     XR CHEST PORTABLE   Final Result   1. Increased pneumonia throughout the right lung remaining most prominent in   the right upper lobe. 2. Emphysema better demonstrated on CT. 3. Possible trace right pleural effusion. XR CHEST PORTABLE   Final Result   Improved aeration of the right chest with persistent consolidation in the mid   to upper right chest.                All pertinent images and reports for the current Hospitalization were reviewed by me.        Scheduled Meds:   enoxaparin  40 mg SubCUTAneous Daily    meropenem  1,000 mg IntraVENous Q8H    tobramycin (PF)  300 mg Nebulization BID    sodium chloride  500 mL IntraVENous Once    aspirin  81 mg Oral Daily    nystatin  5 mL Oral 4x Daily    prasugrel  10 mg Oral Daily    sodium chloride flush  5-40 mL IntraVENous 2 times per day    atorvastatin  40 mg Oral Nightly    tiZANidine  4 mg Oral Nightly    pantoprazole  40 mg Oral Daily    gabapentin  600 mg Oral BID    DULoxetine  60 mg Oral BID    insulin lispro  0-12 Units SubCUTAneous TID     insulin lispro  0-6 Units SubCUTAneous Nightly mometasone-formoterol  2 puff Inhalation BID    And    tiotropium  2 puff Inhalation Daily       Continuous Infusions:   sodium chloride 5 mL/hr at 07/25/22 0502    dextrose         PRN Meds:  HYDROcodone 5 mg - acetaminophen, sodium chloride flush, sodium chloride, acetaminophen, [Held by provider] oxyCODONE, [Held by provider] HYDROcodone 5 mg - acetaminophen, perflutren lipid microspheres, traZODone, ipratropium-albuterol, cetirizine, albuterol sulfate HFA, glucose, dextrose bolus **OR** dextrose bolus, glucagon (rDNA), dextrose, ondansetron, polyethylene glycol      Assessment:     Patient Active Problem List   Diagnosis    Osteopenia-last dexa 2009 repeat 2015 (-2.4 hip)--advised tx    Colon polyp, hyperplastic,-(done 3/11-repeat 3-5 yrs--dr Angle Yeung)    Family history of colon cancer    CTS (carpal tunnel syndrome)BILATERAL-s/p surgical repair--resolved     Postmenopausal status-last mammogram 2/15 wnl last pap 12/13--wnl    Nondependent alcohol abuse, in remission    Urticaria, chronic    Smoking    Chronic back pain-(djd) saw dr Chan Cleaves afford surgery--seeing dr Danyel Tejeda for pain meds    Fibromyalgia    Hypercholesteremia    Lumbar spondylosis    Vitamin D deficiency--severe--started 50,000 iu 2x/ wk 11/13    Insomnia--on elevil w help    Constipation--on daily miralax    Depression    Chronic pain syndrome    Muscle cramping    Acute on chronic respiratory failure with hypercapnia (HCC)    ROLY (acute kidney injury) ([address]    Severe sepsis (HCC)    Gastroesophageal reflux disease    COPD, severe ([address]    Chronic hypoxemic respiratory failure ([address]    Degeneration of lumbar or lumbosacral intervertebral disc    Trochanteric bursitis of right hip    COPD exacerbation ([address]    Diabetes ([address]    Controlled type 2 diabetes mellitus without complication, without long-term current use of insulin ([address]    Age-related osteoporosis without current pathological fracture- started alendronate 9/2021    Pulmonary nodule seen on imaging study    Pneumonia of right lung due to infectious organism    General weakness    Elevated lactic acid level    Community acquired pneumonia of right lung    Chronic obstructive pulmonary disease (HCC)    Acute respiratory failure with hypoxia (HCC)    Acute on chronic respiratory failure with hypoxia and hypercapnia (HCC)    Abnormal EKG    NSTEMI (non-ST elevated myocardial infarction) (HCC)    Necrotizing pneumonia (HCC)    Abscess of upper lobe of right lung with pneumonia (HCC)    Pseudomonas respiratory infection    Abnormal CT of the chest     Severe necrotizing Pseudomonas pneumonia  Right upper lobe cavitary lesion  Right right lung evolving abscess  Right lung dense consolidation of  Pseudomonas pneumonia developing some resistance  COPD exacerbation  Hypoxic respiratory failure  Coronary artery disease  Status post cardiac cath  Chronic smoking  Abnormal CT chest  BNP elevation  COVID-19 negative        Unfortunately she developed progressive changes with dense consolidation and lung abscess secondary to Pseudomonas pneumonia. Pseudomonas appears to have developed resistance to quinolones while on therapy this is concerning. CT chest on this admission grossly abnormal and definitely there is progressive changes given the severity of the -pneumonia will need IV antibiotics     OPAT d/w pt she needs to QUIT smoking d/w pt and her family      unfortunately still has Severe hypoxia and respiratory distress required transfer to ICU now on BiPAP. Heart rate is elevated secondary to respiratory distress-. Not able to come off BiPAP for extended period of time transferred out to progressive care unit.     Given the lung findings will have to continue antibiotic therapy anticipate least 4 more weeks of duration of treatment    We will repeat sputum testing to see if Pseudomonas is clearing out on the current therapy    Labs, Microbiology, Radiology and all the pertinent results from current hospitalization and  care every where were reviewed  by me as a part of the evaluation   Plan:   IV Meropenem 1 gm q 8 HR X 4 weeks  Inh Tobramycin Neb Q 12 hRS  Picc line placed   OPAT d/w pt  QUIT smoking  Will repeat CT chest in  3 weeks  Cont supportive care  CT images reviewed see above    Risk for progression and intubation high   Was on  BIPAP for resp failure and worsening resp status now tx to PCU -currently using 5 L of nasal cannula  We will plan to retest a sputum for culture y        Discussed with patient/Family and Nursing   Risk of Complications/Morbidity: High      Illness(es)/ Infection present that pose threat to bodily function. There is potential for severe exacerbation of infection/side effects of treatment. Therapy requires intensive monitoring for antimicrobial agent toxicity. Discussed with patient/Family and Nursing staff     Thanks for allowing me to participate in your patient's care and please call me with any questions or concerns.     Leta Guevara MD  Infectious Disease  [facility]) Physician  Phone: [phone]   Fax : [phone]

## 2022-07-31 ENCOUNTER — APPOINTMENT (OUTPATIENT)
Dept: GENERAL RADIOLOGY | Age: 77
DRG: 246 | End: 2022-07-31
Payer: MEDICARE

## 2022-07-31 PROBLEM — J18.9 MULTIFOCAL PNEUMONIA: Status: ACTIVE | Noted: 2022-07-26

## 2022-07-31 PROBLEM — Z45.2 PERIPHERALLY INSERTED CENTRAL CATHETER (PICC) IN PLACE: Status: ACTIVE | Noted: 2022-07-31

## 2022-07-31 LAB
ANION GAP SERPL CALCULATED.3IONS-SCNC: 6 MMOL/L (ref 3–16)
ANISOCYTOSIS: ABNORMAL
BANDED NEUTROPHILS RELATIVE PERCENT: 11 % (ref 0–7)
BASOPHILS ABSOLUTE: 0 K/UL (ref 0–0.2)
BASOPHILS RELATIVE PERCENT: 0 %
BUN BLDV-MCNC: 9 MG/DL (ref 7–20)
CALCIUM SERPL-MCNC: 8.3 MG/DL (ref 8.3–10.6)
CHLORIDE BLD-SCNC: 97 MMOL/L (ref 99–110)
CO2: 32 MMOL/L (ref 21–32)
CREAT SERPL-MCNC: <0.5 MG/DL (ref 0.6–1.2)
EOSINOPHILS ABSOLUTE: 0.1 K/UL (ref 0–0.6)
EOSINOPHILS RELATIVE PERCENT: 1 %
GFR AFRICAN AMERICAN: >60
GFR NON-AFRICAN AMERICAN: >60
GLUCOSE BLD-MCNC: 100 MG/DL (ref 70–99)
GLUCOSE BLD-MCNC: 115 MG/DL (ref 70–99)
GLUCOSE BLD-MCNC: 116 MG/DL (ref 70–99)
GLUCOSE BLD-MCNC: 148 MG/DL (ref 70–99)
GLUCOSE BLD-MCNC: 98 MG/DL (ref 70–99)
HCT VFR BLD CALC: 25.3 % (ref 36–48)
HEMOGLOBIN: 8.5 G/DL (ref 12–16)
LYMPHOCYTES ABSOLUTE: 0.5 K/UL (ref 1–5.1)
LYMPHOCYTES RELATIVE PERCENT: 7 %
MCH RBC QN AUTO: 30.7 PG (ref 26–34)
MCHC RBC AUTO-ENTMCNC: 33.8 G/DL (ref 31–36)
MCV RBC AUTO: 90.9 FL (ref 80–100)
METAMYELOCYTES RELATIVE PERCENT: 2 %
MONOCYTES ABSOLUTE: 0.2 K/UL (ref 0–1.3)
MONOCYTES RELATIVE PERCENT: 3 %
NEUTROPHILS ABSOLUTE: 6.8 K/UL (ref 1.7–7.7)
NEUTROPHILS RELATIVE PERCENT: 76 %
PDW BLD-RTO: 14.5 % (ref 12.4–15.4)
PERFORMED ON: ABNORMAL
PLATELET # BLD: 615 K/UL (ref 135–450)
PMV BLD AUTO: 6.9 FL (ref 5–10.5)
POTASSIUM REFLEX MAGNESIUM: 4.2 MMOL/L (ref 3.5–5.1)
RBC # BLD: 2.78 M/UL (ref 4–5.2)
SODIUM BLD-SCNC: 135 MMOL/L (ref 136–145)
WBC # BLD: 7.6 K/UL (ref 4–11)

## 2022-07-31 PROCEDURE — 6370000000 HC RX 637 (ALT 250 FOR IP): Performed by: INTERNAL MEDICINE

## 2022-07-31 PROCEDURE — 99233 SBSQ HOSP IP/OBS HIGH 50: CPT | Performed by: INTERNAL MEDICINE

## 2022-07-31 PROCEDURE — 6360000002 HC RX W HCPCS: Performed by: INTERNAL MEDICINE

## 2022-07-31 PROCEDURE — 85025 COMPLETE CBC W/AUTO DIFF WBC: CPT

## 2022-07-31 PROCEDURE — 2580000003 HC RX 258: Performed by: INTERNAL MEDICINE

## 2022-07-31 PROCEDURE — 51702 INSERT TEMP BLADDER CATH: CPT

## 2022-07-31 PROCEDURE — 71045 X-RAY EXAM CHEST 1 VIEW: CPT

## 2022-07-31 PROCEDURE — 2700000000 HC OXYGEN THERAPY PER DAY

## 2022-07-31 PROCEDURE — 36592 COLLECT BLOOD FROM PICC: CPT

## 2022-07-31 PROCEDURE — 94761 N-INVAS EAR/PLS OXIMETRY MLT: CPT

## 2022-07-31 PROCEDURE — 6370000000 HC RX 637 (ALT 250 FOR IP): Performed by: NURSE PRACTITIONER

## 2022-07-31 PROCEDURE — 80048 BASIC METABOLIC PNL TOTAL CA: CPT

## 2022-07-31 PROCEDURE — 6360000002 HC RX W HCPCS: Performed by: NURSE PRACTITIONER

## 2022-07-31 PROCEDURE — 94640 AIRWAY INHALATION TREATMENT: CPT

## 2022-07-31 PROCEDURE — 94669 MECHANICAL CHEST WALL OSCILL: CPT

## 2022-07-31 PROCEDURE — 2060000000 HC ICU INTERMEDIATE R&B

## 2022-07-31 RX ADMIN — ATORVASTATIN CALCIUM 40 MG: 40 TABLET, FILM COATED ORAL at 20:29

## 2022-07-31 RX ADMIN — TIOTROPIUM BROMIDE INHALATION SPRAY 2 PUFF: 3.12 SPRAY, METERED RESPIRATORY (INHALATION) at 07:48

## 2022-07-31 RX ADMIN — MEROPENEM 1000 MG: 1 INJECTION, POWDER, FOR SOLUTION INTRAVENOUS at 21:53

## 2022-07-31 RX ADMIN — SODIUM CHLORIDE, PRESERVATIVE FREE 10 ML: 5 INJECTION INTRAVENOUS at 08:13

## 2022-07-31 RX ADMIN — DULOXETINE HYDROCHLORIDE 60 MG: 60 CAPSULE, DELAYED RELEASE ORAL at 08:13

## 2022-07-31 RX ADMIN — NYSTATIN 500000 UNITS: 100000 SUSPENSION ORAL at 14:20

## 2022-07-31 RX ADMIN — HYDROCODONE BITARTRATE AND ACETAMINOPHEN 1 TABLET: 5; 325 TABLET ORAL at 14:17

## 2022-07-31 RX ADMIN — INSULIN LISPRO 1 UNITS: 100 INJECTION, SOLUTION INTRAVENOUS; SUBCUTANEOUS at 20:32

## 2022-07-31 RX ADMIN — MEROPENEM 1000 MG: 1 INJECTION, POWDER, FOR SOLUTION INTRAVENOUS at 14:18

## 2022-07-31 RX ADMIN — ENOXAPARIN SODIUM 40 MG: 100 INJECTION SUBCUTANEOUS at 08:12

## 2022-07-31 RX ADMIN — PRASUGREL 10 MG: 10 TABLET, FILM COATED ORAL at 08:12

## 2022-07-31 RX ADMIN — GABAPENTIN 600 MG: 300 CAPSULE ORAL at 08:13

## 2022-07-31 RX ADMIN — MEROPENEM 1000 MG: 1 INJECTION, POWDER, FOR SOLUTION INTRAVENOUS at 05:24

## 2022-07-31 RX ADMIN — ASPIRIN 81 MG: 81 TABLET, COATED ORAL at 08:13

## 2022-07-31 RX ADMIN — PANTOPRAZOLE SODIUM 40 MG: 40 TABLET, DELAYED RELEASE ORAL at 08:12

## 2022-07-31 RX ADMIN — NYSTATIN 500000 UNITS: 100000 SUSPENSION ORAL at 20:30

## 2022-07-31 RX ADMIN — NYSTATIN 500000 UNITS: 100000 SUSPENSION ORAL at 08:12

## 2022-07-31 RX ADMIN — HYDROCODONE BITARTRATE AND ACETAMINOPHEN 1 TABLET: 5; 325 TABLET ORAL at 20:29

## 2022-07-31 RX ADMIN — NYSTATIN 500000 UNITS: 100000 SUSPENSION ORAL at 18:02

## 2022-07-31 RX ADMIN — HYDROCODONE BITARTRATE AND ACETAMINOPHEN 1 TABLET: 5; 325 TABLET ORAL at 08:13

## 2022-07-31 RX ADMIN — MOMETASONE FUROATE AND FORMOTEROL FUMARATE DIHYDRATE 2 PUFF: 200; 5 AEROSOL RESPIRATORY (INHALATION) at 20:03

## 2022-07-31 RX ADMIN — DULOXETINE HYDROCHLORIDE 60 MG: 60 CAPSULE, DELAYED RELEASE ORAL at 20:30

## 2022-07-31 RX ADMIN — GABAPENTIN 600 MG: 300 CAPSULE ORAL at 20:29

## 2022-07-31 RX ADMIN — TOBRAMYCIN 300 MG: 300 SOLUTION RESPIRATORY (INHALATION) at 07:50

## 2022-07-31 RX ADMIN — MOMETASONE FUROATE AND FORMOTEROL FUMARATE DIHYDRATE 2 PUFF: 200; 5 AEROSOL RESPIRATORY (INHALATION) at 07:50

## 2022-07-31 RX ADMIN — TIZANIDINE 4 MG: 4 TABLET ORAL at 20:29

## 2022-07-31 RX ADMIN — TRAZODONE HYDROCHLORIDE 100 MG: 100 TABLET ORAL at 20:29

## 2022-07-31 RX ADMIN — TOBRAMYCIN 300 MG: 300 SOLUTION RESPIRATORY (INHALATION) at 20:03

## 2022-07-31 ASSESSMENT — PAIN DESCRIPTION - LOCATION: LOCATION: BACK

## 2022-07-31 ASSESSMENT — PAIN SCALES - WONG BAKER: WONGBAKER_NUMERICALRESPONSE: 0

## 2022-07-31 ASSESSMENT — PAIN SCALES - GENERAL
PAINLEVEL_OUTOF10: 9
PAINLEVEL_OUTOF10: 10

## 2022-07-31 ASSESSMENT — PAIN DESCRIPTION - ORIENTATION: ORIENTATION: LOWER;MID

## 2022-07-31 NOTE — PLAN OF CARE
Problem: Discharge Planning  Goal: Discharge to home or other facility with appropriate resources  Outcome: Progressing  Flowsheets (Taken 7/30/2022 2124)  Discharge to home or other facility with appropriate resources: Identify barriers to discharge with patient and caregiver     Problem: Pain  Goal: Verbalizes/displays adequate comfort level or baseline comfort level  Outcome: Progressing     Problem: Confusion  Goal: Confusion, delirium, dementia, or psychosis is improved or at baseline  Description: INTERVENTIONS:  1. Assess for possible contributors to thought disturbance, including medications, impaired vision or hearing, underlying metabolic abnormalities, dehydration, psychiatric diagnoses, and notify attending LIP  2. [place] high risk fall precautions, as indicated  3. Provide frequent short contacts to provide reality reorientation, refocusing and direction  4. Decrease environmental stimuli, including noise as appropriate  5. Monitor and intervene to maintain adequate nutrition, hydration, elimination, sleep and activity  6. If unable to ensure safety without constant attention obtain sitter and review sitter guidelines with assigned personnel  7. Initiate Psychosocial CNS and Spiritual Care consult, as indicated  Outcome: Progressing  Flowsheets (Taken 7/30/2022 2124)  Effect of thought disturbance (confusion, delirium, dementia, or psychosis) are managed with adequate functional status: Assess for contributors to thought disturbance, including medications, impaired vision or hearing, underlying metabolic abnormalities, dehydration, psychiatric diagnoses, notify LIP     Problem: Skin/Tissue Integrity  Goal: Absence of new skin breakdown  Description: 1. Monitor for areas of redness and/or skin breakdown  2. Assess vascular access sites hourly  3. Every 4-6 hours minimum:  Change oxygen saturation probe site  4.   Every 4-6 hours:  If on nasal continuous positive airway pressure, respiratory

## 2022-07-31 NOTE — PROGRESS NOTES
Infectious Disease Follow up Notes  Admit Date: 7/18/2022  Hospital Day: 14    Antibiotics :   IV Meropenem  Inhaled Tobramycin     CHIEF COMPLAINT:      Rt UL lung abscess  Severe Necrotizing PNA  Pseudomonas PNA  Hypoxic resp failure      Subjective interval History :  78 y. o.woman with a past medical history of COPD, depression, chronic hypoxic respiratory failure now on   4 L of oxygen via nasal cannula, diabetes recent admission for COPD exacerbation and pneumonia diagnosed to have Pseudomonas based on sputum studies. She was on IV cefepime transition to oral levofloxacin on discharge. She was sent to [address] rehab facility and now admitted because of worsening shortness of breath increased sputum production. Sputum culture again on this admission positive for Pseudomonas with some resistance pattern, it is now become resistant to quinolones. CT chest on this admission with progressive pneumonia right upper lobe cavitary lesion possible lung abscess. Extensive consolidation  see images -  This is a new finding on the CT chest compared to her previous CT chest on 6/20/22. Given the necrotizing pneumonia with Pseudomonas infection and lung abscess formation we are consulted for recommendations. Interval History : Remains on nasal cannula able to go down to 5 L today cough+ lot of sputum  and mucus per patient, repeat sputum culture in process. Tolerating antibiotic therapy okay. PICC line working well    Past Medical History:    Past Medical History:   Diagnosis Date    CAD (coronary artery disease) 07/19/2022    NSTEMI, PCI LCx    Chronic pain syndrome     Percocet.  Dr. Wanda Rivera    Colorectal polyps     COPD (chronic obstructive pulmonary disease) ([facility] [address]     CTS (carpal tunnel syndrome)     BILATERAL    DDD (degenerative disc disease), lumbar     Depression     Family hx of colon cancer     Fibromyalgia Hyperlipemia     IBS (irritable bowel syndrome)     Lumbar spondylosis     New onset type 2 diabetes mellitus ([facility] [address] 02/03/2020    On home O2     4L    Pneumonia 07/2022    P.aer    Urticaria, chronic        Past Surgical History:    Past Surgical History:   Procedure Laterality Date    CARPAL TUNNEL RELEASE Bilateral     CATARACT EXTRACTION      CERVICAL POLYP REMOVAL  09/2004    CORONARY ANGIOPLASTY WITH STENT PLACEMENT  07/19/2022    LCx 99. PCI/stent. INTRACAPSULAR CATARACT EXTRACTION Left 06/23/2020    Phacoemulsification with intraocular lens implant performed by Kevin Eugene MD at [address] Right 07/14/2020    Phacoemulsification with intraocular lens implant performed by Kevin Eugene MD at [address]      patient denies        Current Medications:    No outpatient medications have been marked as taking for the 7/18/22 encounter [facility] Encounter). Allergies:  Patient has no known allergies.     Immunizations :   Immunization History   Administered Date(s) Administered    COVID-19, MODERNA [place] border, Primary or Immunocompromised, (age 12y+), IM, 100 mcg/0.5mL 03/08/2021, 04/05/2021    COVID-19, MODERNA Booster BLUE border, (age 18y+), IM, 50mcg/0.25mL 12/01/2021    Influenza Vaccine, unspecified formulation 01/10/2017    Influenza, High Dose (Fluzone 65 yrs and older) 11/04/2013, 01/21/2016, 09/01/2017, 10/30/2018, 09/18/2020    Influenza, Johnny Forget, adjuvanted, 65 yrs +, IM, PF (Fluad) 09/27/2021    Influenza, Triv, inactivated, subunit, adjuvanted, IM (Fluad 65 yrs and older) 09/13/2019    Pneumococcal Conjugate 13-valent ([id number]) 01/19/2015    Pneumococcal Conjugate Vaccine 01/10/2017    Pneumococcal Polysaccharide ([id number]) 10/12/2012    Tdap (Boostrix, Adacel) 07/17/2007    Zoster Recombinant (Shingrix) 11/21/2019       Social History:     Social History     Tobacco Use    Smoking status: Every Day     Packs/day: 1.00     Years: 55.00     Pack years: 55.00     Types: Cigarettes     Start date: 1/1/1962    Smokeless tobacco: Never    Tobacco comments:     almost ready to quit   Vaping Use    Vaping Use: Never used   Substance Use Topics    Alcohol use: No     Alcohol/week: 0.0 standard drinks    Drug use: No     Social History     Tobacco Use   Smoking Status Every Day    Packs/day: 1.00    Years: 55.00    Pack years: 55.00    Types: Cigarettes    Start date: 1/1/1962   Smokeless Tobacco Never   Tobacco Comments    almost ready to quit      Family History   Problem Relation Age of Onset    Cancer Father         COLON     Alzheimer's Disease Mother     Heart Disease Brother     Heart Failure Brother     Diabetes Daughter     Diabetes Daughter     Diabetes Daughter           REVIEW OF SYSTEMS:      Constitutional:  negative for fevers, chills, night sweats  Eyes:  negative for blurred vision, eye discharge, visual disturbance   HEENT:  negative for hearing loss, ear drainage,nasal congestion  Respiratory:  r cough++ , shortness of breath + sputum + or hemoptysis  +   Cardiovascular:  negative for chest pain, palpitations, syncope  Gastrointestinal:  negative for nausea, vomiting, diarrhea, constipation, abdominal pain  Genitourinary:  negative for frequency, dysuria, urinary incontinence, hematuria  Hematologic/Lymphatic:  negative for easy bruising, bleeding and lymphadenopathy  Allergic/Immunologic:  negative for recurrent infections, angioedema, anaphylaxis   Endocrine:  negative for weight changes, polyuria, polydipsia and polyphagia  Musculoskeletal:  negative for joint  pain, swelling, decreased range of motion  Integumentary: No rashes, skin lesions  Neurological:  negative for headaches, slurred speech, unilateral weakness  Psychiatric: negative for hallucinations,confusion,agitation.                 PHYSICAL EXAM:      Vitals:    BP (!) 106/93   Pulse (!) 105   Temp 99.2 °F (37.3 °C) 5.6 07/18/2022 05:20 PM    PROT 6.9 10/12/2012 11:00 AM    BILITOT 0.5 07/18/2022 05:20 PM    LABALBU 2.4 07/18/2022 05:20 PM     UA:  Lab Results   Component Value Date/Time    COLORU Yellow 07/22/2022 06:31 AM    CLARITYU Clear 07/22/2022 06:31 AM    GLUCOSEU Negative 07/22/2022 06:31 AM    BILIRUBINUR Negative 07/22/2022 06:31 AM    BILIRUBINUR neg 10/17/2019 11:50 AM    KETUA TRACE 07/22/2022 06:31 AM    SPECGRAV 1.049 07/22/2022 06:31 AM    BLOODU Negative 07/22/2022 06:31 AM    PHUR 6.5 07/22/2022 06:31 AM    PROTEINU 30 07/22/2022 06:31 AM    UROBILINOGEN 0.2 07/22/2022 06:31 AM    NITRU Negative 07/22/2022 06:31 AM    LEUKOCYTESUR Negative 07/22/2022 06:31 AM    LABMICR YES 07/22/2022 06:31 AM    URINETYPE NotGiven 07/22/2022 06:31 AM      Urine Microscopic:   Lab Results   Component Value Date/Time    LABCAST 10-20 Hyaline 01/10/2017 06:19 PM    BACTERIA None Seen 07/22/2022 06:31 AM    COMU see below 07/03/2022 03:11 PM    HYALCAST 2 07/22/2022 06:31 AM    WBCUA 2 07/22/2022 06:31 AM    RBCUA 5 07/22/2022 06:31 AM    EPIU 1 07/22/2022 06:31 AM     Urine Reflex to Culture:   Lab Results   Component Value Date/Time    URRFLXCULT Not Indicated 07/03/2022 03:11 PM         MICRO: cultures reviewed and updated by me   Blood Culture:          Culture, Respiratory [[id number]] (Abnormal)  Collected: 07/22/22 1200   Order Status: Completed Specimen: Sputum Expectorated Updated: 07/24/22 0801    CULTURE, RESPIRATORY Rare growth normal respiratory fabiana with Abnormal     Gram Stain Result No Epithelial Cells seen   3+ WBC's (Polymorphonuclear)   No organisms seen     Organism Pseudomonas aeruginosa Abnormal     CULTURE, RESPIRATORY Light growth   Narrative:     ORDER#: [id number]                          ORDERED BY: CIRA KEBEDE   SOURCE: Sputum Expectorated                COLLECTED:  07/22/22 12:00   ANTIBIOTICS AT GURWINDER.:                      RECEIVED :  07/22/22 12:22   Respiratory Culture [[id number]] (Abnormal) Collected: 07/19/22 2030   Order Status: Completed Specimen: Sputum Expectorated Updated: 07/23/22 0748    CULTURE, RESPIRATORY Light growth normal respiratory fabiana with Abnormal     Gram Stain Result 2+ Gram positive cocci   2+ WBC's (Polymorphonuclear)   1+ Epithelial Cells     Organism Pseudomonas aeruginosa Abnormal     CULTURE, RESPIRATORY Light growth   Narrative:     ORDER#: [id number]                          ORDERED BY: FARZANA GAN   SOURCE: Sputum Expectorated                COLLECTED:  07/19/22 20:30   ANTIBIOTICS AT GURWINDER.:                      RECEIVED :  07/19/22 21:19   Culture, Blood 1 [[id number]] Collected: 07/18/22 1841   Order Status: Completed Specimen: Blood Updated: 07/22/22 2015    Blood Culture, Routine No Growth after 4 days of incubation. Narrative:     ORDER#: [id number]                          ORDERED BY: Moses Marte   SOURCE: Blood                              COLLECTED:  07/18/22 18:41   ANTIBIOTICS AT GURWINDER.:                      RECEIVED :  07/18/22 18:56   If child <=2 yrs old please draw pediatric bottle. ~Blood Culture 1   Culture, Blood 2 [[id number]] Collected: 07/18/22 1850   Order Status: Completed Specimen: Blood Updated: 07/22/22 2015    Culture, Blood 2 No Growth after 4 days of incubation. Narrative:     ORDER#: [id number]                          ORDERED BY: Moses Marte   SOURCE: Blood                              COLLECTED:  07/18/22 18:50   ANTIBIOTICS AT GURWINDER.:                      RECEIVED :  07/18/22 18:56   If child <=2 yrs old please draw pediatric bottle. ~Blood Culture #2   Strep Pneumoniae Antigen [[id number]] Collected: 07/20/22 1020   Order Status: Completed Specimen: Urine, clean catch Updated: 07/21/22 0841    STREP PNEUMONIAE ANTIGEN, URINE --    Presumptive Negative   Presumptive negative suggests no current or recent   pneumococcal infection.  Infection due to Strep pneumoniae   cannot be ruled out since the antigen present in the sample   may be under an Emergency Use   Authorization (EUA) for use by authorized laboratories. Fact sheet for Healthcare Providers:   BuildHer.es   Fact sheet for Patients: BuildHer.es     METHODOLOGY: Isothermal Nucleic Acid Amplification         CULTURE, RESPIRATORY Rare growth normal respiratory fabiana with Abnormal     Gram Stain Result No Epithelial Cells seen   3+ WBC's (Polymorphonuclear)   No organisms seen    Organism Pseudomonas aeruginosa Abnormal     CULTURE, RESPIRATORY Light growth    Resulting Agency [facility]          Susceptibility      Pseudomonas aeruginosa (1)    Antibiotic Interpretation Microscan  Method Status    cefepime Sensitive 8 mcg/mL BACTERIAL SUSCEPTIBILITY PANEL BY TIERRA     ciprofloxacin Resistant >2 mcg/mL BACTERIAL SUSCEPTIBILITY PANEL BY TIERRA     gentamicin Sensitive <=4 mcg/mL BACTERIAL SUSCEPTIBILITY PANEL BY TIERRA     meropenem Sensitive <=1 mcg/mL BACTERIAL SUSCEPTIBILITY PANEL BY TIERRA     piperacillin-tazobactam Sensitive <=16 mcg/mL BACTERIAL SUSCEPTIBILITY PANEL BY TIERRA     tobramycin Sensitive <=4 mcg/mL BACTERIAL SUSCEPTIBILITY PANEL BY TIERRA          Narrative  Performed by: [facility]  ORDER#: [id number]                          ORDERED BY: CIRA KEBEDE   SOURCE: Sputum Expectorated                COLLECTED:  07/22/22 12:00   ANTIBIOTICS AT GURWINDER.:                      RECEIVED :  07/22/22 12:22           Lab Results   Component Value Date/Time    BC No Growth after 4 days of incubation. 07/18/2022 06:41 PM    BLOODCULT2 No Growth after 4 days of incubation.  07/18/2022 06:50 PM       Respiratory Culture:  Lab Results   Component Value Date/Time    CULTRESP Further report to follow 07/30/2022 04:00 PM    LABGRAM  07/30/2022 04:00 PM     3+ WBC's (Mononuclear)  1+ Epithelial Cells  1+ Gram positive cocci       AFB:No results found for: AFBSMEAR  Viral Culture:  Lab Results   Component Value Date/Time    COVID19 Not Detected 07/18/2022 06:25 PM     Urine Culture: No results for input(s): LABURIN in the last 72 hours. IMAGING:    XR CHEST PORTABLE   Final Result   Stable multifocal airspace disease in the right lung with probable upper lobe   abscess. XR CHEST PORTABLE   Final Result   Stable multifocal airspace disease in the right lung, including probable   pulmonary abscess in the right upper lobe. CT CHEST WO CONTRAST   Final Result   1. Mixed consolidative and ground-glass opacities as well as interstitial   thickening throughout the majority of the right lung compatible with   pneumonia. 2. There is a cavity in the right upper lobe demonstrating an air-fluid level   measuring up to 9.7 cm concerning for abscess formation. 3. Trace right pleural effusion. 4. Prominent and enlarged mediastinal lymph nodes, likely reactive. 5. Stable 6 mm left lower lobe pulmonary nodule. RECOMMENDATIONS:   Fleischner Society guidelines for follow-up and management of incidentally   detected pulmonary nodules:      Single Solid Nodule:      Nodule size less than 6 mm   In a low-risk patient, no routine follow-up. In a high-risk patient, optional CT at 12 months. Nodule size equals 6-8 mm   In a low-risk patient, CT at 6-12 months, then consider CT at 18-24 months. In a high-risk patient, CT at 6-12 months, then CT at 18-24 months. Nodule size greater than 8 mm         In a low-risk patient, consider CT at 3 months, PET/CT, or tissue sampling. In a high-risk patient, consider CT at 3 months, PET/CT, or tissue sampling. Multiple Solid Nodules:      Nodule size less than 6 mm   In a low-risk patient, no routine follow-up. In a high-risk patient, optional CT at 12 months. Nodule size equals 6-8 mm   In a low-risk patient, CT at 3-6 months, then consider CT at 18-24 months. In a high-risk patient, CT at 3-6 months, then CT at 18-24 months.       Nodule size greater than 8 mm In a low-risk patient, CT at 3-6 months, then consider CT at 18-24 months. In a high-risk patient, CT at 3-6 months, then CT at 18-24 months. - Low risk patients include individuals with minimal or absent history of   smoking and other known risk factors. - High risk patients include individuals with a history or smoking or known   risk factors. Radiology 2017 http://pubs. rsna.org/doi/full/10.1148/radiol. 8805399646         XR CHEST PORTABLE   Final Result   1. Increased pneumonia throughout the right lung remaining most prominent in   the right upper lobe. 2. Emphysema better demonstrated on CT. 3. Possible trace right pleural effusion. XR CHEST PORTABLE   Final Result   Improved aeration of the right chest with persistent consolidation in the mid   to upper right chest.           XR CHEST PORTABLE   Final Result   1. Increased pneumonia throughout the right lung remaining most prominent in   the right upper lobe. 2. Emphysema better demonstrated on CT. 3. Possible trace right pleural effusion. XR CHEST PORTABLE   Final Result   Improved aeration of the right chest with persistent consolidation in the mid   to upper right chest.                All pertinent images and reports for the current Hospitalization were reviewed by me.        Scheduled Meds:   enoxaparin  40 mg SubCUTAneous Daily    meropenem  1,000 mg IntraVENous Q8H    tobramycin (PF)  300 mg Nebulization BID    sodium chloride  500 mL IntraVENous Once    aspirin  81 mg Oral Daily    nystatin  5 mL Oral 4x Daily    prasugrel  10 mg Oral Daily    sodium chloride flush  5-40 mL IntraVENous 2 times per day    atorvastatin  40 mg Oral Nightly    tiZANidine  4 mg Oral Nightly    pantoprazole  40 mg Oral Daily    gabapentin  600 mg Oral BID    DULoxetine  60 mg Oral BID    insulin lispro  0-12 Units SubCUTAneous TID     insulin lispro  0-6 Units SubCUTAneous Nightly    mometasone-formoterol  2 puff Inhalation BID And    tiotropium  2 puff Inhalation Daily       Continuous Infusions:   sodium chloride 5 mL/hr at 07/25/22 0502    dextrose         PRN Meds:  HYDROcodone 5 mg - acetaminophen, sodium chloride flush, sodium chloride, acetaminophen, [Held by provider] oxyCODONE, [Held by provider] HYDROcodone 5 mg - acetaminophen, perflutren lipid microspheres, traZODone, ipratropium-albuterol, cetirizine, albuterol sulfate HFA, glucose, dextrose bolus **OR** dextrose bolus, glucagon (rDNA), dextrose, ondansetron, polyethylene glycol      Assessment:     Patient Active Problem List   Diagnosis    Osteopenia-last dexa 2009 repeat 2015 (-2.4 hip)--advised tx    Colon polyp, hyperplastic,-(done 3/11-repeat 3-5 yrs--dr Chris Lopez)    Family history of colon cancer    CTS (carpal tunnel syndrome)BILATERAL-s/p surgical repair--resolved     Postmenopausal status-last mammogram 2/15 wnl last pap 12/13--wnl    Nondependent alcohol abuse, in remission    Urticaria, chronic    Smoking    Chronic back pain-(djd) saw dr Chuckie Stahl afford surgery--seeing dr Rosanne Garcia for pain meds    Fibromyalgia    Hypercholesteremia    Lumbar spondylosis    Vitamin D deficiency--severe--started 50,000 iu 2x/ wk 11/13    Insomnia--on elevil w help    Constipation--on daily miralax    Depression    Chronic pain syndrome    Muscle cramping    Acute on chronic respiratory failure with hypercapnia (HCC)    ROLY (acute kidney injury) ([address]    Severe sepsis (HCC)    Gastroesophageal reflux disease    COPD, severe ([address]    Chronic hypoxemic respiratory failure ([address]    Degeneration of lumbar or lumbosacral intervertebral disc    Trochanteric bursitis of right hip    COPD exacerbation ([address]    Diabetes ([address]    Controlled type 2 diabetes mellitus without complication, without long-term current use of insulin ([address]    Age-related osteoporosis without current pathological fracture- started alendronate 9/2021    Pulmonary nodule seen on imaging study    Pneumonia of right lung due to infectious organism    General weakness    Elevated lactic acid level    Community acquired pneumonia of right lung    Chronic obstructive pulmonary disease (HCC)    Acute respiratory failure with hypoxia (HCC)    Acute on chronic respiratory failure with hypoxia and hypercapnia (HCC)    Abnormal EKG    NSTEMI (non-ST elevated myocardial infarction) (HCC)    Necrotizing pneumonia (HCC)    Abscess of upper lobe of right lung with pneumonia (HCC)    Pseudomonas respiratory infection    Abnormal CT of the chest     Severe necrotizing Pseudomonas pneumonia  Right upper lobe cavitary lesion  Right right lung evolving abscess  Right lung dense consolidation of  Pseudomonas pneumonia developing some resistance  COPD exacerbation  Hypoxic respiratory failure  Coronary artery disease  Status post cardiac cath  Chronic smoking  Abnormal CT chest  BNP elevation  COVID-19 negative        Unfortunately she developed progressive changes with dense consolidation and lung abscess secondary to Pseudomonas pneumonia. Pseudomonas appears to have developed resistance to quinolones while on therapy this is concerning. CT chest on this admission grossly abnormal and definitely there is progressive changes given the severity of the -pneumonia will need IV antibiotics     OPAT d/w pt she needs to QUIT smoking d/w pt and her family      unfortunately still has Severe hypoxia and respiratory distress required transfer to ICU now on BiPAP. Heart rate is elevated secondary to respiratory distress-. Not able to come off BiPAP for extended period of time transferred out to progressive care unit.     Given the lung findings will have to continue antibiotic therapy anticipate least 4 more weeks of duration of treatment    We will repeat sputum testing to see if Pseudomonas is clearing out on the current therapy-sputum repeat is in process    Labs, Microbiology, Radiology and all the pertinent results from current hospitalization and care every where were reviewed  by me as a part of the evaluation   Plan:   IV Meropenem 1 gm q 8 HR X 4 weeks  Inh Tobramycin Neb Q 12 hRS  Picc line placed   OPAT d/w pt  QUIT smoking  Will repeat CT chest in  3 weeks  Cont supportive care  CT images reviewed see above    Risk for progression and intubation high   Was on  BIPAP for resp failure and worsening resp status now tx to PCU -currently using 5 L of nasal cannula  11. retest a sputum for culture to see if Pseudomonas is going away        Discussed with patient/Family and Nursing   Risk of Complications/Morbidity: High      Illness(es)/ Infection present that pose threat to bodily function. There is potential for severe exacerbation of infection/side effects of treatment. Therapy requires intensive monitoring for antimicrobial agent toxicity. Discussed with patient/Family and Nursing staff     Thanks for allowing me to participate in your patient's care and please call me with any questions or concerns.     Maksim Pulido MD  Infectious Disease  [facility]) Physician  Phone: [phone]   Fax : [phone]

## 2022-07-31 NOTE — PROGRESS NOTES
Pulmonary Progress Note    Date of Admission: 7/18/2022   LOS: 13 days       CC:  Chief Complaint   Patient presents with    Shortness of Breath     At [facility] for rehab for pneumonia increased sob over the last few days. Pt on 4-5 lpm via nc all the time. Per EMS the cord was very long ems placed on 6lpm via nc and o2 sat came up to 99%        Subjective:   Sleeping     ROS:           Assessment:          Plan: This note may have been transcribed using [id number] [employer]. Please disregard any translational errors. Hospital Day: 13     Pseudomonas pneumonia  Merrem and Arnie  ID following    chest X-ray unchanged    COPD baseline 4L NC  FEV1 59% 2017  Dulera / Spiriva              Data:        PHYSICAL EXAM:   Blood pressure (!) 106/93, pulse (!) 105, temperature 99.2 °F (37.3 °C), temperature source Oral, resp. rate 20, height 5' 4\" (1.626 m), weight 139 lb 15.9 oz (63.5 kg), SpO2 93 %, not currently breastfeeding.'  Body mass index is 24.03 kg/m². Gen: No distress. ENT:   Resp: No accessory muscle use. No crackles. No wheezes. No rhonchi. CV: Regular rate. Regular rhythm. No murmur or rub. No edema. Skin: Warm, dry, normal texture and turgor. No nodule on exposed extremities. M/S: No cyanosis. No clubbing. No joint deformity.   Psych: sleeping       Medications:    Scheduled Meds:   enoxaparin  40 mg SubCUTAneous Daily    meropenem  1,000 mg IntraVENous Q8H    tobramycin (PF)  300 mg Nebulization BID    sodium chloride  500 mL IntraVENous Once    aspirin  81 mg Oral Daily    nystatin  5 mL Oral 4x Daily    prasugrel  10 mg Oral Daily    sodium chloride flush  5-40 mL IntraVENous 2 times per day    atorvastatin  40 mg Oral Nightly    tiZANidine  4 mg Oral Nightly    pantoprazole  40 mg Oral Daily    gabapentin  600 mg Oral BID    DULoxetine  60 mg Oral BID    insulin lispro  0-12 Units SubCUTAneous TID WC    insulin lispro  0-6 Units SubCUTAneous Nightly    mometasone-formoterol  2 puff Inhalation BID    And    tiotropium  2 puff Inhalation Daily       Continuous Infusions:   sodium chloride 5 mL/hr at 07/25/22 0502    dextrose         PRN Meds:  HYDROcodone 5 mg - acetaminophen, sodium chloride flush, sodium chloride, acetaminophen, [Held by provider] oxyCODONE, [Held by provider] HYDROcodone 5 mg - acetaminophen, perflutren lipid microspheres, traZODone, ipratropium-albuterol, cetirizine, albuterol sulfate HFA, glucose, dextrose bolus **OR** dextrose bolus, glucagon (rDNA), dextrose, ondansetron, polyethylene glycol    Labs reviewed:  CBC:   Recent Labs     07/29/22  0540 07/30/22  0540 07/31/22  0528   WBC 9.7 9.4 7.6   HGB 8.1* 8.9* 8.5*   HCT 23.5* 25.7* 25.3*   MCV 89.8 90.5 90.9   * 624* 615*     BMP:   Recent Labs     07/29/22  0540 07/30/22  0540 07/31/22  0528   * 134* 135*   K 4.0 4.4 4.2   CL 95* 94* 97*   CO2 33* 31 32   PHOS 2.5 3.2  --    BUN 11 9 9   CREATININE <0.5* <0.5* <0.5*     LIVER PROFILE: No results for input(s): AST, ALT, LIPASE, BILIDIR, BILITOT, ALKPHOS in the last 72 hours. Invalid input(s): AMYLASE,  ALB  PT/INR: No results for input(s): PROTIME, INR in the last 72 hours. APTT: No results for input(s): APTT in the last 72 hours. UA:No results for input(s): NITRITE, COLORU, PHUR, LABCAST, WBCUA, RBCUA, MUCUS, TRICHOMONAS, YEAST, BACTERIA, CLARITYU, SPECGRAV, LEUKOCYTESUR, UROBILINOGEN, BILIRUBINUR, BLOODU, GLUCOSEU, AMORPHOUS in the last 72 hours. Invalid input(s): Rubén Push  No results for input(s): PH, PCO2, PO2 in the last 72 hours. Cx:      Films: This note was transcribed using [id number] [facility]. Please disregard any translational errors.       [facility] [phone]

## 2022-08-01 LAB
ANION GAP SERPL CALCULATED.3IONS-SCNC: 5 MMOL/L (ref 3–16)
BANDED NEUTROPHILS RELATIVE PERCENT: 4 % (ref 0–7)
BASOPHILS ABSOLUTE: 0 K/UL (ref 0–0.2)
BASOPHILS RELATIVE PERCENT: 0 %
BUN BLDV-MCNC: 8 MG/DL (ref 7–20)
CALCIUM SERPL-MCNC: 7.8 MG/DL (ref 8.3–10.6)
CHLORIDE BLD-SCNC: 100 MMOL/L (ref 99–110)
CO2: 31 MMOL/L (ref 21–32)
CREAT SERPL-MCNC: <0.5 MG/DL (ref 0.6–1.2)
CULTURE, RESPIRATORY: NORMAL
EOSINOPHILS ABSOLUTE: 0.1 K/UL (ref 0–0.6)
EOSINOPHILS RELATIVE PERCENT: 1 %
GFR AFRICAN AMERICAN: >60
GFR NON-AFRICAN AMERICAN: >60
GLUCOSE BLD-MCNC: 101 MG/DL (ref 70–99)
GLUCOSE BLD-MCNC: 112 MG/DL (ref 70–99)
GLUCOSE BLD-MCNC: 116 MG/DL (ref 70–99)
GLUCOSE BLD-MCNC: 123 MG/DL (ref 70–99)
GLUCOSE BLD-MCNC: 99 MG/DL (ref 70–99)
GRAM STAIN RESULT: NORMAL
HCT VFR BLD CALC: 23.3 % (ref 36–48)
HEMOGLOBIN: 7.9 G/DL (ref 12–16)
LYMPHOCYTES ABSOLUTE: 0.5 K/UL (ref 1–5.1)
LYMPHOCYTES RELATIVE PERCENT: 7 %
MCH RBC QN AUTO: 30.8 PG (ref 26–34)
MCHC RBC AUTO-ENTMCNC: 33.9 G/DL (ref 31–36)
MCV RBC AUTO: 90.8 FL (ref 80–100)
METAMYELOCYTES RELATIVE PERCENT: 4 %
MONOCYTES ABSOLUTE: 0.3 K/UL (ref 0–1.3)
MONOCYTES RELATIVE PERCENT: 4 %
NEUTROPHILS ABSOLUTE: 5.9 K/UL (ref 1.7–7.7)
NEUTROPHILS RELATIVE PERCENT: 80 %
PDW BLD-RTO: 14.7 % (ref 12.4–15.4)
PERFORMED ON: ABNORMAL
PLATELET # BLD: 591 K/UL (ref 135–450)
PLATELET SLIDE REVIEW: ABNORMAL
PMV BLD AUTO: 6.8 FL (ref 5–10.5)
POTASSIUM REFLEX MAGNESIUM: 4.1 MMOL/L (ref 3.5–5.1)
RBC # BLD: 2.56 M/UL (ref 4–5.2)
RBC # BLD: NORMAL 10*6/UL
SLIDE REVIEW: ABNORMAL
SODIUM BLD-SCNC: 136 MMOL/L (ref 136–145)
WBC # BLD: 6.7 K/UL (ref 4–11)

## 2022-08-01 PROCEDURE — 97116 GAIT TRAINING THERAPY: CPT | Performed by: PHYSICAL THERAPIST

## 2022-08-01 PROCEDURE — 51702 INSERT TEMP BLADDER CATH: CPT

## 2022-08-01 PROCEDURE — 6370000000 HC RX 637 (ALT 250 FOR IP): Performed by: NURSE PRACTITIONER

## 2022-08-01 PROCEDURE — 80048 BASIC METABOLIC PNL TOTAL CA: CPT

## 2022-08-01 PROCEDURE — U0003 INFECTIOUS AGENT DETECTION BY NUCLEIC ACID (DNA OR RNA); SEVERE ACUTE RESPIRATORY SYNDROME CORONAVIRUS 2 (SARS-COV-2) (CORONAVIRUS DISEASE [COVID-19]), AMPLIFIED PROBE TECHNIQUE, MAKING USE OF HIGH THROUGHPUT TECHNOLOGIES AS DESCRIBED BY CMS-2020-01-R: HCPCS

## 2022-08-01 PROCEDURE — 97530 THERAPEUTIC ACTIVITIES: CPT | Performed by: PHYSICAL THERAPIST

## 2022-08-01 PROCEDURE — 36592 COLLECT BLOOD FROM PICC: CPT

## 2022-08-01 PROCEDURE — U0005 INFEC AGEN DETEC AMPLI PROBE: HCPCS

## 2022-08-01 PROCEDURE — 2700000000 HC OXYGEN THERAPY PER DAY

## 2022-08-01 PROCEDURE — 99232 SBSQ HOSP IP/OBS MODERATE 35: CPT | Performed by: INTERNAL MEDICINE

## 2022-08-01 PROCEDURE — 6370000000 HC RX 637 (ALT 250 FOR IP): Performed by: INTERNAL MEDICINE

## 2022-08-01 PROCEDURE — 85025 COMPLETE CBC W/AUTO DIFF WBC: CPT

## 2022-08-01 PROCEDURE — 94760 N-INVAS EAR/PLS OXIMETRY 1: CPT

## 2022-08-01 PROCEDURE — 2580000003 HC RX 258: Performed by: INTERNAL MEDICINE

## 2022-08-01 PROCEDURE — 6360000002 HC RX W HCPCS: Performed by: INTERNAL MEDICINE

## 2022-08-01 PROCEDURE — 2060000000 HC ICU INTERMEDIATE R&B

## 2022-08-01 PROCEDURE — 94640 AIRWAY INHALATION TREATMENT: CPT

## 2022-08-01 PROCEDURE — 6360000002 HC RX W HCPCS: Performed by: NURSE PRACTITIONER

## 2022-08-01 RX ORDER — ASPIRIN 81 MG/1
81 TABLET ORAL DAILY
Qty: 30 TABLET | Refills: 3 | Status: SHIPPED | OUTPATIENT
Start: 2022-08-02

## 2022-08-01 RX ORDER — PRASUGREL 10 MG/1
10 TABLET, FILM COATED ORAL DAILY
Qty: 30 TABLET | Refills: 1 | Status: SHIPPED | OUTPATIENT
Start: 2022-08-02 | End: 2022-10-17 | Stop reason: SDUPTHER

## 2022-08-01 RX ADMIN — GABAPENTIN 600 MG: 300 CAPSULE ORAL at 21:12

## 2022-08-01 RX ADMIN — MEROPENEM 1000 MG: 1 INJECTION, POWDER, FOR SOLUTION INTRAVENOUS at 05:31

## 2022-08-01 RX ADMIN — MEROPENEM 1000 MG: 1 INJECTION, POWDER, FOR SOLUTION INTRAVENOUS at 21:15

## 2022-08-01 RX ADMIN — ENOXAPARIN SODIUM 40 MG: 100 INJECTION SUBCUTANEOUS at 08:48

## 2022-08-01 RX ADMIN — NYSTATIN 500000 UNITS: 100000 SUSPENSION ORAL at 21:12

## 2022-08-01 RX ADMIN — TOBRAMYCIN 300 MG: 300 SOLUTION RESPIRATORY (INHALATION) at 08:24

## 2022-08-01 RX ADMIN — NYSTATIN 500000 UNITS: 100000 SUSPENSION ORAL at 17:58

## 2022-08-01 RX ADMIN — HYDROCODONE BITARTRATE AND ACETAMINOPHEN 1 TABLET: 5; 325 TABLET ORAL at 08:48

## 2022-08-01 RX ADMIN — TIZANIDINE 4 MG: 4 TABLET ORAL at 21:12

## 2022-08-01 RX ADMIN — TIOTROPIUM BROMIDE INHALATION SPRAY 2 PUFF: 3.12 SPRAY, METERED RESPIRATORY (INHALATION) at 08:24

## 2022-08-01 RX ADMIN — PRASUGREL 10 MG: 10 TABLET, FILM COATED ORAL at 08:49

## 2022-08-01 RX ADMIN — ATORVASTATIN CALCIUM 40 MG: 40 TABLET, FILM COATED ORAL at 21:12

## 2022-08-01 RX ADMIN — TOBRAMYCIN 300 MG: 300 SOLUTION RESPIRATORY (INHALATION) at 20:51

## 2022-08-01 RX ADMIN — DULOXETINE HYDROCHLORIDE 60 MG: 60 CAPSULE, DELAYED RELEASE ORAL at 21:12

## 2022-08-01 RX ADMIN — MEROPENEM 1000 MG: 1 INJECTION, POWDER, FOR SOLUTION INTRAVENOUS at 14:11

## 2022-08-01 RX ADMIN — DULOXETINE HYDROCHLORIDE 60 MG: 60 CAPSULE, DELAYED RELEASE ORAL at 08:48

## 2022-08-01 RX ADMIN — GABAPENTIN 600 MG: 300 CAPSULE ORAL at 08:48

## 2022-08-01 RX ADMIN — HYDROCODONE BITARTRATE AND ACETAMINOPHEN 1 TABLET: 5; 325 TABLET ORAL at 15:54

## 2022-08-01 RX ADMIN — MOMETASONE FUROATE AND FORMOTEROL FUMARATE DIHYDRATE 2 PUFF: 200; 5 AEROSOL RESPIRATORY (INHALATION) at 20:50

## 2022-08-01 RX ADMIN — HYDROCODONE BITARTRATE AND ACETAMINOPHEN 1 TABLET: 5; 325 TABLET ORAL at 23:22

## 2022-08-01 RX ADMIN — ASPIRIN 81 MG: 81 TABLET, COATED ORAL at 08:49

## 2022-08-01 RX ADMIN — SODIUM CHLORIDE, PRESERVATIVE FREE 10 ML: 5 INJECTION INTRAVENOUS at 08:49

## 2022-08-01 RX ADMIN — NYSTATIN 500000 UNITS: 100000 SUSPENSION ORAL at 14:11

## 2022-08-01 RX ADMIN — PANTOPRAZOLE SODIUM 40 MG: 40 TABLET, DELAYED RELEASE ORAL at 08:49

## 2022-08-01 RX ADMIN — MOMETASONE FUROATE AND FORMOTEROL FUMARATE DIHYDRATE 2 PUFF: 200; 5 AEROSOL RESPIRATORY (INHALATION) at 08:24

## 2022-08-01 RX ADMIN — NYSTATIN 500000 UNITS: 100000 SUSPENSION ORAL at 08:48

## 2022-08-01 ASSESSMENT — PAIN DESCRIPTION - PAIN TYPE: TYPE: CHRONIC PAIN

## 2022-08-01 ASSESSMENT — PAIN SCALES - GENERAL
PAINLEVEL_OUTOF10: 8
PAINLEVEL_OUTOF10: 7
PAINLEVEL_OUTOF10: 0
PAINLEVEL_OUTOF10: 8
PAINLEVEL_OUTOF10: 9

## 2022-08-01 ASSESSMENT — PAIN DESCRIPTION - FREQUENCY: FREQUENCY: CONTINUOUS

## 2022-08-01 ASSESSMENT — PAIN DESCRIPTION - ORIENTATION: ORIENTATION: LOWER

## 2022-08-01 ASSESSMENT — PAIN - FUNCTIONAL ASSESSMENT: PAIN_FUNCTIONAL_ASSESSMENT: ACTIVITIES ARE NOT PREVENTED

## 2022-08-01 ASSESSMENT — PAIN DESCRIPTION - ONSET: ONSET: ON-GOING

## 2022-08-01 ASSESSMENT — PAIN DESCRIPTION - DESCRIPTORS: DESCRIPTORS: ACHING

## 2022-08-01 ASSESSMENT — PAIN DESCRIPTION - LOCATION: LOCATION: BACK

## 2022-08-01 NOTE — PLAN OF CARE
Problem: Discharge Planning  Goal: Discharge to home or other facility with appropriate resources  Outcome: Progressing     Problem: Pain  Goal: Verbalizes/displays adequate comfort level or baseline comfort level  Outcome: Progressing     Problem: Confusion  Goal: Confusion, delirium, dementia, or psychosis is improved or at baseline  Description: INTERVENTIONS:  1. Assess for possible contributors to thought disturbance, including medications, impaired vision or hearing, underlying metabolic abnormalities, dehydration, psychiatric diagnoses, and notify attending LIP  2. [place] high risk fall precautions, as indicated  3. Provide frequent short contacts to provide reality reorientation, refocusing and direction  4. Decrease environmental stimuli, including noise as appropriate  5. Monitor and intervene to maintain adequate nutrition, hydration, elimination, sleep and activity  6. If unable to ensure safety without constant attention obtain sitter and review sitter guidelines with assigned personnel  7. Initiate Psychosocial CNS and Spiritual Care consult, as indicated  Outcome: Progressing     Problem: Skin/Tissue Integrity  Goal: Absence of new skin breakdown  Description: 1. Monitor for areas of redness and/or skin breakdown  2. Assess vascular access sites hourly  3. Every 4-6 hours minimum:  Change oxygen saturation probe site  4. Every 4-6 hours:  If on nasal continuous positive airway pressure, respiratory therapy assess nares and determine need for appliance change or resting period.   Outcome: Progressing     Problem: Safety - Adult  Goal: Free from fall injury  Outcome: Progressing     Problem: ABCDS Injury Assessment  Goal: Absence of physical injury  Outcome: Progressing     Problem: Chronic Conditions and Co-morbidities  Goal: Patient's chronic conditions and co-morbidity symptoms are monitored and maintained or improved  Outcome: Progressing     Problem: Nutrition Deficit:  Goal: Optimize nutritional status  Outcome: Progressing

## 2022-08-01 NOTE — PROGRESS NOTES
Lumbar spondylosis     New onset type 2 diabetes mellitus ([facility] [address] 02/03/2020    On home O2     4L    Pneumonia 07/2022    P.aer    Urticaria, chronic        Past Surgical History:    Past Surgical History:   Procedure Laterality Date    CARPAL TUNNEL RELEASE Bilateral     CATARACT EXTRACTION      CERVICAL POLYP REMOVAL  09/2004    CORONARY ANGIOPLASTY WITH STENT PLACEMENT  07/19/2022    LCx 99. PCI/stent. INTRACAPSULAR CATARACT EXTRACTION Left 06/23/2020    Phacoemulsification with intraocular lens implant performed by Marvin Hernandez MD at [address] Right 07/14/2020    Phacoemulsification with intraocular lens implant performed by Marvin Hernandez MD at [address]      patient denies        Current Medications:    Outpatient Medications Marked as Taking for the 7/18/22 encounter [facility] Encounter)   Medication Sig Dispense Refill    [START ON 8/2/2022] aspirin 81 MG EC tablet Take 1 tablet by mouth in the morning. 30 tablet 3    [START ON 8/2/2022] prasugrel (EFFIENT) 10 MG TABS Take 1 tablet by mouth in the morning. 30 tablet 1       Allergies:  Patient has no known allergies.     Immunizations :   Immunization History   Administered Date(s) Administered    COVID-19, MODERNA BLUE border, Primary or Immunocompromised, (age 12y+), IM, 100 mcg/0.5mL 03/08/2021, 04/05/2021    COVID-19, MODERNA Booster BLUE border, (age 18y+), IM, 50mcg/0.25mL 12/01/2021    Influenza Vaccine, unspecified formulation 01/10/2017    Influenza, High Dose (Fluzone 65 yrs and older) 11/04/2013, 01/21/2016, 09/01/2017, 10/30/2018, 09/18/2020    Influenza, Quadv, adjuvanted, 65 yrs +, IM, PF (Fluad) 09/27/2021    Influenza, Triv, inactivated, subunit, adjuvanted, IM (Fluad 65 yrs and older) 09/13/2019    Pneumococcal Conjugate 13-valent ([id number]) 01/19/2015    Pneumococcal Conjugate Vaccine 01/10/2017    Pneumococcal Polysaccharide ([id number]) 10/12/2012    Tdap (Boostrix, Adacel) 07/17/2007    Zoster Recombinant (Shingrix) 11/21/2019       Social History:     Social History     Tobacco Use    Smoking status: Every Day     Packs/day: 1.00     Years: 55.00     Pack years: 55.00     Types: Cigarettes     Start date: 1/1/1962    Smokeless tobacco: Never    Tobacco comments:     almost ready to quit   Vaping Use    Vaping Use: Never used   Substance Use Topics    Alcohol use: No     Alcohol/week: 0.0 standard drinks    Drug use: No     Social History     Tobacco Use   Smoking Status Every Day    Packs/day: 1.00    Years: 55.00    Pack years: 55.00    Types: Cigarettes    Start date: 1/1/1962   Smokeless Tobacco Never   Tobacco Comments    almost ready to quit      Family History   Problem Relation Age of Onset    Cancer Father         COLON     Alzheimer's Disease Mother     Heart Disease Brother     Heart Failure Brother     Diabetes Daughter     Diabetes Daughter     Diabetes Daughter           REVIEW OF SYSTEMS:      Constitutional:  negative for fevers, chills, night sweats  Eyes:  negative for blurred vision, eye discharge, visual disturbance   HEENT:  negative for hearing loss, ear drainage,nasal congestion  Respiratory:  r cough++ , shortness of breath + sputum + or hemoptysis  +   Cardiovascular:  negative for chest pain, palpitations, syncope  Gastrointestinal:  negative for nausea, vomiting, diarrhea, constipation, abdominal pain  Genitourinary:  negative for frequency, dysuria, urinary incontinence, hematuria  Hematologic/Lymphatic:  negative for easy bruising, bleeding and lymphadenopathy  Allergic/Immunologic:  negative for recurrent infections, angioedema, anaphylaxis   Endocrine:  negative for weight changes, polyuria, polydipsia and polyphagia  Musculoskeletal:  negative for joint  pain, swelling, decreased range of motion  Integumentary: No rashes, skin lesions  Neurological:  negative for headaches, slurred speech, unilateral CRP  Hepatic Function Panel:   Lab Results   Component Value Date/Time    ALKPHOS 88 07/18/2022 05:20 PM    ALT 65 07/18/2022 05:20 PM    AST 61 07/18/2022 05:20 PM    PROT 5.6 07/18/2022 05:20 PM    PROT 6.9 10/12/2012 11:00 AM    BILITOT 0.5 07/18/2022 05:20 PM    LABALBU 2.4 07/18/2022 05:20 PM     UA:  Lab Results   Component Value Date/Time    COLORU Yellow 07/22/2022 06:31 AM    CLARITYU Clear 07/22/2022 06:31 AM    GLUCOSEU Negative 07/22/2022 06:31 AM    BILIRUBINUR Negative 07/22/2022 06:31 AM    BILIRUBINUR neg 10/17/2019 11:50 AM    KETUA TRACE 07/22/2022 06:31 AM    SPECGRAV 1.049 07/22/2022 06:31 AM    BLOODU Negative 07/22/2022 06:31 AM    PHUR 6.5 07/22/2022 06:31 AM    PROTEINU 30 07/22/2022 06:31 AM    UROBILINOGEN 0.2 07/22/2022 06:31 AM    NITRU Negative 07/22/2022 06:31 AM    LEUKOCYTESUR Negative 07/22/2022 06:31 AM    LABMICR YES 07/22/2022 06:31 AM    URINETYPE NotGiven 07/22/2022 06:31 AM      Urine Microscopic:   Lab Results   Component Value Date/Time    LABCAST 10-20 Hyaline 01/10/2017 06:19 PM    BACTERIA None Seen 07/22/2022 06:31 AM    COMU see below 07/03/2022 03:11 PM    HYALCAST 2 07/22/2022 06:31 AM    WBCUA 2 07/22/2022 06:31 AM    RBCUA 5 07/22/2022 06:31 AM    EPIU 1 07/22/2022 06:31 AM     Urine Reflex to Culture:   Lab Results   Component Value Date/Time    URRFLXCULT Not Indicated 07/03/2022 03:11 PM         MICRO: cultures reviewed and updated by me   Blood Culture:          Culture, Respiratory [[id number]] (Abnormal)  Collected: 07/22/22 1200   Order Status: Completed Specimen: Sputum Expectorated Updated: 07/24/22 0801    CULTURE, RESPIRATORY Rare growth normal respiratory fabiana with Abnormal     Gram Stain Result No Epithelial Cells seen   3+ WBC's (Polymorphonuclear)   No organisms seen     Organism Pseudomonas aeruginosa Abnormal     CULTURE, RESPIRATORY Light growth   Narrative:     ORDER#: [id number]                          ORDERED BY: CIRA KEBEDE   SOURCE: Sputum Expectorated                COLLECTED:  07/22/22 12:00   ANTIBIOTICS AT GURWINDER.:                      RECEIVED :  07/22/22 12:22   Respiratory Culture [[id number]] (Abnormal)  Collected: 07/19/22 2030   Order Status: Completed Specimen: Sputum Expectorated Updated: 07/23/22 0748    CULTURE, RESPIRATORY Light growth normal respiratory fabiana with Abnormal     Gram Stain Result 2+ Gram positive cocci   2+ WBC's (Polymorphonuclear)   1+ Epithelial Cells     Organism Pseudomonas aeruginosa Abnormal     CULTURE, RESPIRATORY Light growth   Narrative:     ORDER#: [id number]                          ORDERED BY: FARZANA GAN   SOURCE: Sputum Expectorated                COLLECTED:  07/19/22 20:30   ANTIBIOTICS AT GURWINDER.:                      RECEIVED :  07/19/22 21:19   Culture, Blood 1 [[id number]] Collected: 07/18/22 1841   Order Status: Completed Specimen: Blood Updated: 07/22/22 2015    Blood Culture, Routine No Growth after 4 days of incubation. Narrative:     ORDER#: [id number]                          ORDERED BY: Monica Berger   SOURCE: Blood                              COLLECTED:  07/18/22 18:41   ANTIBIOTICS AT GURWINDER.:                      RECEIVED :  07/18/22 18:56   If child <=2 yrs old please draw pediatric bottle. ~Blood Culture 1   Culture, Blood 2 [[id number]] Collected: 07/18/22 1850   Order Status: Completed Specimen: Blood Updated: 07/22/22 2015    Culture, Blood 2 No Growth after 4 days of incubation. Narrative:     ORDER#: [id number]                          ORDERED BY: Monica Berger   SOURCE: Blood                              COLLECTED:  07/18/22 18:50   ANTIBIOTICS AT GURWINDER.:                      RECEIVED :  07/18/22 18:56   If child <=2 yrs old please draw pediatric bottle. ~Blood Culture #2   Strep Pneumoniae Antigen [[id number]] Collected: 07/20/22 1020   Order Status: Completed Specimen: Urine, clean catch Updated: 07/21/22 0841    STREP PNEUMONIAE ANTIGEN, URINE --    Presumptive Negative Presumptive negative suggests no current or recent   pneumococcal infection. Infection due to Strep pneumoniae   cannot be ruled out since the antigen present in the sample   may be below the detection limit of the test.   Normal Range:Presumptive Negative    Narrative:     ORDER#: [id number]                          ORDERED BY: FARZANA GAN   SOURCE: Urine Clean Catch                  COLLECTED:  07/20/22 10:20   ANTIBIOTICS AT GURWINDER.:                      RECEIVED :  07/20/22 10:27   Legionella antigen, urine [[id number]] Collected: 07/20/22 1020   Order Status: Completed Specimen: Urine, catheter Updated: 07/21/22 0840    L. pneumophila Serogp 1 Ur Ag --    Presumptive Negative   No Legionella pneumophila serogroup 1 antigens detected. A negative result does not exclude infection with   Legionella pneumophila serogroup 1 nor does it rule out   other microbial-caused respiratory infections or   disease caused by other serogroups of   Legionella pneumophila.    Normal Range: Presumptive Negative    Narrative:     ORDER#: [id number]                          ORDERED BY: FARZANA GAN   SOURCE: Urine Catheter                     COLLECTED:  07/20/22 10:20   ANTIBIOTICS AT GURWINDER.:                      RECEIVED :  07/20/22 10:28   Sputum gram stain [[id number]] Collected: 07/19/22 2030   Order Status: Canceled Specimen: Sputum Expectorated    Rapid influenza A/B antigens [[id number]] Collected: 07/19/22 0210   Order Status: Completed Specimen: Nares Updated: 07/19/22 0245    Rapid Influenza A Ag Negative    Rapid Influenza B Ag Negative   COVID-19, Rapid [[id number]] Collected: 07/18/22 1825   Order Status: Completed Specimen: Nasopharyngeal Swab Updated: 07/18/22 1854    SARS-CoV-2, NAAT Not Detected    Comment: Rapid NAAT:   Negative results should be treated as presumptive and,   if inconsistent with clinical signs and symptoms or necessary for   patient management, should be tested with an alternative molecular assay. Negative results do not preclude SARS-CoV-2 infection and   should not be used as the sole basis for patient management decisions. This test has been authorized by the FDA under an Emergency Use   Authorization (EUA) for use by authorized laboratories. Fact sheet for Healthcare Providers:   Maykel.prashanth   Fact sheet for Patients: Maykel.prashanth     METHODOLOGY: Isothermal Nucleic Acid Amplification         CULTURE, RESPIRATORY Rare growth normal respiratory fabiana with Abnormal     Gram Stain Result No Epithelial Cells seen   3+ WBC's (Polymorphonuclear)   No organisms seen    Organism Pseudomonas aeruginosa Abnormal     CULTURE, RESPIRATORY Light growth    Resulting Agency [address] [facility]          Susceptibility      Pseudomonas aeruginosa (1)    Antibiotic Interpretation Microscan  Method Status    cefepime Sensitive 8 mcg/mL BACTERIAL SUSCEPTIBILITY PANEL BY TIERRA     ciprofloxacin Resistant >2 mcg/mL BACTERIAL SUSCEPTIBILITY PANEL BY TIERRA     gentamicin Sensitive <=4 mcg/mL BACTERIAL SUSCEPTIBILITY PANEL BY TIERRA     meropenem Sensitive <=1 mcg/mL BACTERIAL SUSCEPTIBILITY PANEL BY TIERRA     piperacillin-tazobactam Sensitive <=16 mcg/mL BACTERIAL SUSCEPTIBILITY PANEL BY TIERRA     tobramycin Sensitive <=4 mcg/mL BACTERIAL SUSCEPTIBILITY PANEL BY TIERRA          Narrative  Performed by: [facility]  ORDER#: [id number]                          ORDERED BY: CIRA KEBEDE   SOURCE: Sputum Expectorated                COLLECTED:  07/22/22 12:00   ANTIBIOTICS AT GURWINDER.:                      RECEIVED :  07/22/22 12:22           Lab Results   Component Value Date/Time    BC No Growth after 4 days of incubation. 07/18/2022 06:41 PM    BLOODCULT2 No Growth after 4 days of incubation.  07/18/2022 06:50 PM       Respiratory Culture:  Lab Results   Component Value Date/Time    CULTRESP Normal respiratory fabiana 07/30/2022 04:00 PM    LABGRAM  07/30/2022 04:00 PM 3+ WBC's (Mononuclear)  1+ Epithelial Cells  1+ Gram positive cocci       AFB:No results found for: AFBSMEAR  Viral Culture:  Lab Results   Component Value Date/Time    COVID19 Not Detected 07/18/2022 06:25 PM     Urine Culture: No results for input(s): LABURIN in the last 72 hours. IMAGING:    XR CHEST PORTABLE   Final Result   Stable multifocal airspace disease in the right lung with probable upper lobe   abscess. XR CHEST PORTABLE   Final Result   Stable multifocal airspace disease in the right lung, including probable   pulmonary abscess in the right upper lobe. CT CHEST WO CONTRAST   Final Result   1. Mixed consolidative and ground-glass opacities as well as interstitial   thickening throughout the majority of the right lung compatible with   pneumonia. 2. There is a cavity in the right upper lobe demonstrating an air-fluid level   measuring up to 9.7 cm concerning for abscess formation. 3. Trace right pleural effusion. 4. Prominent and enlarged mediastinal lymph nodes, likely reactive. 5. Stable 6 mm left lower lobe pulmonary nodule. RECOMMENDATIONS:   Fleischner Society guidelines for follow-up and management of incidentally   detected pulmonary nodules:      Single Solid Nodule:      Nodule size less than 6 mm   In a low-risk patient, no routine follow-up. In a high-risk patient, optional CT at 12 months. Nodule size equals 6-8 mm   In a low-risk patient, CT at 6-12 months, then consider CT at 18-24 months. In a high-risk patient, CT at 6-12 months, then CT at 18-24 months. Nodule size greater than 8 mm         In a low-risk patient, consider CT at 3 months, PET/CT, or tissue sampling. In a high-risk patient, consider CT at 3 months, PET/CT, or tissue sampling. Multiple Solid Nodules:      Nodule size less than 6 mm   In a low-risk patient, no routine follow-up. In a high-risk patient, optional CT at 12 months.       Nodule size equals 6-8 mm In a low-risk patient, CT at 3-6 months, then consider CT at 18-24 months. In a high-risk patient, CT at 3-6 months, then CT at 18-24 months. Nodule size greater than 8 mm   In a low-risk patient, CT at 3-6 months, then consider CT at 18-24 months. In a high-risk patient, CT at 3-6 months, then CT at 18-24 months. - Low risk patients include individuals with minimal or absent history of   smoking and other known risk factors. - High risk patients include individuals with a history or smoking or known   risk factors. Radiology 2017 http://pubs. rsna.org/doi/full/10.1148/radiol. 6379940319         XR CHEST PORTABLE   Final Result   1. Increased pneumonia throughout the right lung remaining most prominent in   the right upper lobe. 2. Emphysema better demonstrated on CT. 3. Possible trace right pleural effusion. XR CHEST PORTABLE   Final Result   Improved aeration of the right chest with persistent consolidation in the mid   to upper right chest.           XR CHEST PORTABLE   Final Result   1. Increased pneumonia throughout the right lung remaining most prominent in   the right upper lobe. 2. Emphysema better demonstrated on CT. 3. Possible trace right pleural effusion. XR CHEST PORTABLE   Final Result   Improved aeration of the right chest with persistent consolidation in the mid   to upper right chest.                All pertinent images and reports for the current Hospitalization were reviewed by me.        Scheduled Meds:   enoxaparin  40 mg SubCUTAneous Daily    meropenem  1,000 mg IntraVENous Q8H    tobramycin (PF)  300 mg Nebulization BID    sodium chloride  500 mL IntraVENous Once    aspirin  81 mg Oral Daily    nystatin  5 mL Oral 4x Daily    prasugrel  10 mg Oral Daily    sodium chloride flush  5-40 mL IntraVENous 2 times per day    atorvastatin  40 mg Oral Nightly    tiZANidine  4 mg Oral Nightly    pantoprazole  40 mg Oral Daily    gabapentin  600 mg Oral BID DULoxetine  60 mg Oral BID    insulin lispro  0-12 Units SubCUTAneous TID WC    insulin lispro  0-6 Units SubCUTAneous Nightly    mometasone-formoterol  2 puff Inhalation BID    And    tiotropium  2 puff Inhalation Daily       Continuous Infusions:   sodium chloride 5 mL/hr at 07/25/22 0502    dextrose         PRN Meds:  sodium chloride, HYDROcodone 5 mg - acetaminophen, sodium chloride flush, sodium chloride, acetaminophen, [Held by provider] oxyCODONE, [Held by provider] HYDROcodone 5 mg - acetaminophen, perflutren lipid microspheres, traZODone, ipratropium-albuterol, cetirizine, albuterol sulfate HFA, glucose, dextrose bolus **OR** dextrose bolus, glucagon (rDNA), dextrose, ondansetron, polyethylene glycol      Assessment:     Patient Active Problem List   Diagnosis    Osteopenia-last dexa 2009 repeat 2015 (-2.4 hip)--advised tx    Colon polyp, hyperplastic,-(done 3/11-repeat 3-5 yrs--dr Lamberto Rangel)    Family history of colon cancer    CTS (carpal tunnel syndrome)BILATERAL-s/p surgical repair--resolved     Postmenopausal status-last mammogram 2/15 wnl last pap 12/13--wnl    Nondependent alcohol abuse, in remission    Urticaria, chronic    Smoking    Chronic back pain-(djd) saw dr Zakia Schmitt afford surgery--seeing dr Li Almanza for pain meds    Fibromyalgia    Hypercholesteremia    Lumbar spondylosis    Vitamin D deficiency--severe--started 50,000 iu 2x/ wk 11/13    Insomnia--on elevil w help    Constipation--on daily miralax    Depression    Chronic pain syndrome    Muscle cramping    Acute on chronic respiratory failure with hypercapnia (HCC)    ROLY (acute kidney injury) ([address]    Severe sepsis (HCC)    Gastroesophageal reflux disease    COPD, severe ([address]    Chronic hypoxemic respiratory failure ([address]    Degeneration of lumbar or lumbosacral intervertebral disc    Trochanteric bursitis of right hip    COPD exacerbation ([address]    Diabetes ([address]    Controlled type 2 diabetes mellitus without complication, without long-term current use of insulin (HCC)    Age-related osteoporosis without current pathological fracture- started alendronate 9/2021    Pulmonary nodule seen on imaging study    Pneumonia of right lung due to infectious organism    General weakness    Elevated lactic acid level    Community acquired pneumonia of right lung    Chronic obstructive pulmonary disease (HCC)    Acute respiratory failure with hypoxia (HCC)    Acute on chronic respiratory failure with hypoxia and hypercapnia (HCC)    Abnormal EKG    NSTEMI (non-ST elevated myocardial infarction) (HCC)    Necrotizing pneumonia (HCC)    Multifocal pneumonia    Pseudomonas respiratory infection    Abnormal CT of the chest    Peripherally inserted central catheter (PICC) in place     Severe necrotizing Pseudomonas pneumonia  Right upper lobe cavitary lesion  Right right lung evolving abscess  Right lung dense consolidation of  Pseudomonas pneumonia developing some resistance  COPD exacerbation  Hypoxic respiratory failure  Coronary artery disease  Status post cardiac cath  Chronic smoking  Abnormal CT chest  BNP elevation  COVID-19 negative        Unfortunately she developed progressive changes with dense consolidation and lung abscess secondary to Pseudomonas pneumonia. Pseudomonas appears to have developed resistance to quinolones while on therapy this is concerning. CT chest on this admission grossly abnormal and definitely there is progressive changes given the severity of the -pneumonia will need IV antibiotics     OPAT d/w pt she needs to QUIT smoking d/w pt and her family      unfortunately still has Severe hypoxia and respiratory distress required transfer to ICU now on BiPAP. Heart rate is elevated secondary to respiratory distress-. Not able to come off BiPAP for extended period of time transferred out to progressive care unit.     Given the lung findings will have to continue antibiotic therapy anticipate least 4 more weeks of duration of treatment    We will repeat sputum testing to see if Pseudomonas is clearing out on the current therapy-sputum repeat Normal fabiana noted      Labs, Microbiology, Radiology and all the pertinent results from current hospitalization and  care every where were reviewed  by me as a part of the evaluation   Plan:   IV Meropenem 1 gm q 8 HR X stop date  x  8/27  Inh Tobramycin Neb Q 12 hRS as in patient   Picc line placed   OPAT d/w pt  QUIT smoking  Will repeat CT chest in  3 weeks orders in place   Cont supportive care  CT images reviewed see above    Risk for progression and intubation high   Was on  BIPAP for resp failure  now tx to PCU -currently using 5 L of nasal cannula  11. Repeat sputum clearing up  12. Robby DONE for d/c planning  - d/c when ok with primary         Discussed with patient/Family and Nursing     Thanks for allowing me to participate in your patient's care and please call me with any questions or concerns.     Karina Mejia MD  Infectious Disease  [facility]) Physician  Phone: [phone]   Fax : [phone]

## 2022-08-01 NOTE — PROGRESS NOTES
(g/day): 60- 72 (1-1.2 x CBW 60)     Fluid (ml/day): per provider    Nutrition Diagnosis:    (PES of inadequate po intake resolving) related to other (comment) (mouth sores) as evidenced by poor intake prior to admission      Nutrition Interventions:   Food and/or Nutrient Delivery: Continue Current Diet, Continue Oral Nutrition Supplement  Nutrition Education/Counseling: Education not indicated  Coordination of Nutrition Care: Continue to monitor while inpatient       Goals:  Previous Goal Met: Progressing toward Goal(s)  Goals: PO intake 50% or greater       Nutrition Monitoring and Evaluation:   Behavioral-Environmental Outcomes: None Identified  Food/Nutrient Intake Outcomes: Food and Nutrient Intake, Supplement Intake  Physical Signs/Symptoms Outcomes: Biochemical Data, Chewing or Swallowing, Constipation, Diarrhea, Fluid Status or Edema, Skin, Weight    Discharge Planning:    Continue Oral Nutrition Supplement     Manual Estefania Vega N [address] [place] [address],   Contact: [phone]

## 2022-08-01 NOTE — PROGRESS NOTES
reach, Nurse notified, Chair alarm in place, Left in chair     Restrictions  Restrictions/Precautions  Restrictions/Precautions: Fall Risk  Position Activity Restriction  Other position/activity restrictions: Pt on 5 liters O2 with spO2 in mid 90s at rest and decreasing to 87% with activity     Subjective   General  Chart Reviewed: Yes  Additional Pertinent Hx: Per Dao Amaro MD H&P on 7-: The pt is a 77 yo female who presented to the ED with 2-3 day h/o of worsening SOB. She was recently treated for pna and was having rehab at [facility]. In the ED she had an abd EKG and cardiology consulted and had a LHC on 7- for a NSTEMI. Troponins elevated 0.70. PMHx: chronic pain syndrome, COPD on home O2, lmb DDD, depression, fibromyalgia, DM  Response To Previous Treatment: Patient with no complaints from previous session.   Family / Caregiver Present: Yes  Referring Practitioner: Amy Hauser MD  Referral Date : 07/19/22  Diagnosis: Acute on chronic respiratory failure with hypoxia and hypercapnia, NSTEMI  Follows Commands: Within Functional Limits  Subjective  Subjective: pt reports being fatigue but willing to walk in room with therapy         Social/Functional History  Social/Functional History  Lives With: Significant other (Ray)  Type of Home: Apartment (first floor)  Home Layout: One level (laundry in apt)  Home Access: Level entry  Bathroom Shower/Tub: Tub/Shower unit  Bathroom Toilet: Standard  Bathroom Equipment: Shower chair  Home Equipment: Cane  ADL Assistance: [facility] [address]: Independent (shared with significant other)  Ambulation Assistance: Independent (without AD)  Transfer Assistance: Independent  Active : No  Patient's  Info: significant other drives  Occupation: Retired  Additional Comments: Pt recent admit with COPD Exac, ROLY, UTI and Sepsis and d/c'd on 7/13/2022 to Home at [facility] for rehab.  Vision/Hearing  Vision  Vision: Impaired  Vision Exceptions: Wears glasses for reading  Hearing  Hearing: Within functional limits    Cognition   Orientation  Overall Orientation Status: Within Functional Limits  Cognition  Overall Cognitive Status: WFL     Objective   Heart Rate: (!) 103  Heart Rate Source: Monitor  BP: (!) 86/56  BP Location: Right Arm  BP Method: Automatic  Patient Position: Up in chair;Sitting  MAP (Calculated): 66  Resp: 24  SpO2: 94 %  O2 Device: Nasal cannula                             Bed mobility  Bed Mobility Comments: n/a this session: the pt already up in the chair and remained up in the chair at end of session  Transfers  Sit to Stand: Contact guard assistance;Minimal Assistance  Stand to sit: Contact guard assistance  Ambulation  Surface: level tile  Device: Rolling Walker  Other Apparatus: O2 (5 liters high flow O2)  Assistance: Contact guard assistance;Minimal assistance  Quality of Gait: slow small steps  Distance: 25'  Comments: pt declined any further amb as she reported being very fatigued; took approx 4 min for O2 sats to increase back into low 90s after walking as pt started coughing after walk     Balance  Comments: SBA for sitting balance; CGA for standing with RW           OutComes Score                                                  AM-PAC Score  AM-PAC Inpatient Mobility Raw Score : 16 (07/25/22 1115)  AM-PAC Inpatient T-Scale Score : 40.78 (07/25/22 1115)  Mobility Inpatient CMS 0-100% Score: 54.16 (07/25/22 1115)  Mobility Inpatient CMS G-Code Modifier : CK (07/25/22 1115)          Tinneti Score       Goals  Short Term Goals  Time Frame for Short term goals: upon d/c  Short term goal 1: Bed mobility with sup/mod I. Short term goal 2: Transfers sit <> stand with SBA. Short term goal 3: Ambulate with wh walker 50 feet with CGA.   Short term goal 4: SpO2 levels to stay >90% with activity  Patient Goals   Patient goals : to go home       Education  Patient

## 2022-08-01 NOTE — PROGRESS NOTES
Pulmonary Progress Note    Date of Admission: 7/18/2022   LOS: 14 days       CC:  Chief Complaint   Patient presents with    Shortness of Breath     At [facility] for rehab for pneumonia increased sob over the last few days. Pt on 4-5 lpm via nc all the time. Per EMS the cord was very long ems placed on 6lpm via nc and o2 sat came up to 99%        Subjective:   Awake. Cough      ROS:           Assessment:          Plan: This note may have been transcribed using [id number]. Please disregard any translational errors. Hospital Day: 14     Pseudomonas pneumonia  Merrem and Arnie  ID following     COPD baseline 4L NC  FEV1 59% 2017  Dulera / Spiriva       Defer d/c to ID. Will need rehab       Data:        PHYSICAL EXAM:   Blood pressure (!) 93/55, pulse (!) 103, temperature 98.6 °F (37 °C), temperature source Axillary, resp. rate 19, height 5' 4\" (1.626 m), weight 132 lb 15 oz (60.3 kg), SpO2 92 %, not currently breastfeeding.'  Body mass index is 22.82 kg/m². Gen: No distress. ENT:   Resp: No accessory muscle use. No crackles. No wheezes. No rhonchi. CV: Regular rate. Regular rhythm. No murmur or rub. No edema. Skin: Warm, dry, normal texture and turgor. No nodule on exposed extremities. M/S: No cyanosis. No clubbing. No joint deformity. Psych:  awake.          Medications:    Scheduled Meds:   enoxaparin  40 mg SubCUTAneous Daily    meropenem  1,000 mg IntraVENous Q8H    tobramycin (PF)  300 mg Nebulization BID    sodium chloride  500 mL IntraVENous Once    aspirin  81 mg Oral Daily    nystatin  5 mL Oral 4x Daily    prasugrel  10 mg Oral Daily    sodium chloride flush  5-40 mL IntraVENous 2 times per day    atorvastatin  40 mg Oral Nightly    tiZANidine  4 mg Oral Nightly    pantoprazole  40 mg Oral Daily    gabapentin  600 mg Oral BID    DULoxetine  60 mg Oral BID    insulin lispro  0-12 Units SubCUTAneous TID WC    insulin lispro  0-6 Units SubCUTAneous Nightly    mometasone-formoterol 2 puff Inhalation BID    And    tiotropium  2 puff Inhalation Daily       Continuous Infusions:   sodium chloride 5 mL/hr at 07/25/22 0502    dextrose         PRN Meds:  sodium chloride, HYDROcodone 5 mg - acetaminophen, sodium chloride flush, sodium chloride, acetaminophen, [Held by provider] oxyCODONE, [Held by provider] HYDROcodone 5 mg - acetaminophen, perflutren lipid microspheres, traZODone, ipratropium-albuterol, cetirizine, albuterol sulfate HFA, glucose, dextrose bolus **OR** dextrose bolus, glucagon (rDNA), dextrose, ondansetron, polyethylene glycol    Labs reviewed:  CBC:   Recent Labs     07/30/22 0540 07/31/22 0528 08/01/22  0529   WBC 9.4 7.6 6.7   HGB 8.9* 8.5* 7.9*   HCT 25.7* 25.3* 23.3*   MCV 90.5 90.9 90.8   * 615* 591*       BMP:   Recent Labs     07/30/22 0540 07/31/22 0528 08/01/22 0529   * 135* 136   K 4.4 4.2 4.1   CL 94* 97* 100   CO2 31 32 31   PHOS 3.2  --   --    BUN 9 9 8   CREATININE <0.5* <0.5* <0.5*       LIVER PROFILE: No results for input(s): AST, ALT, LIPASE, BILIDIR, BILITOT, ALKPHOS in the last 72 hours. Invalid input(s): AMYLASE,  ALB  PT/INR: No results for input(s): PROTIME, INR in the last 72 hours. APTT: No results for input(s): APTT in the last 72 hours. UA:No results for input(s): NITRITE, COLORU, PHUR, LABCAST, WBCUA, RBCUA, MUCUS, TRICHOMONAS, YEAST, BACTERIA, CLARITYU, SPECGRAV, LEUKOCYTESUR, UROBILINOGEN, BILIRUBINUR, BLOODU, GLUCOSEU, AMORPHOUS in the last 72 hours. Invalid input(s): Azell Govern  No results for input(s): PH, PCO2, PO2 in the last 72 hours. Cx:      Films: This note was transcribed using [id number] [employer]. Please disregard any translational errors.       [address] [facility] [phone]

## 2022-08-01 NOTE — PROGRESS NOTES
Hospitalist Progress Note      PCP: Nancy Goss APRN - CNP    Date of Admission: 7/18/2022    Chief Complaint: respiratory distress    Hospital Course:      Subjective: Stable on 4-5L      Medications:  Reviewed    Infusion Medications    sodium chloride 5 mL/hr at 07/25/22 0502    dextrose       Scheduled Medications    enoxaparin  40 mg SubCUTAneous Daily    meropenem  1,000 mg IntraVENous Q8H    tobramycin (PF)  300 mg Nebulization BID    sodium chloride  500 mL IntraVENous Once    aspirin  81 mg Oral Daily    nystatin  5 mL Oral 4x Daily    prasugrel  10 mg Oral Daily    sodium chloride flush  5-40 mL IntraVENous 2 times per day    atorvastatin  40 mg Oral Nightly    tiZANidine  4 mg Oral Nightly    pantoprazole  40 mg Oral Daily    gabapentin  600 mg Oral BID    DULoxetine  60 mg Oral BID    insulin lispro  0-12 Units SubCUTAneous TID WC    insulin lispro  0-6 Units SubCUTAneous Nightly    mometasone-formoterol  2 puff Inhalation BID    And    tiotropium  2 puff Inhalation Daily     PRN Meds: sodium chloride, HYDROcodone 5 mg - acetaminophen, sodium chloride flush, sodium chloride, acetaminophen, [Held by provider] oxyCODONE, [Held by provider] HYDROcodone 5 mg - acetaminophen, perflutren lipid microspheres, traZODone, ipratropium-albuterol, cetirizine, albuterol sulfate HFA, glucose, dextrose bolus **OR** dextrose bolus, glucagon (rDNA), dextrose, ondansetron, polyethylene glycol      Intake/Output Summary (Last 24 hours) at 7/31/2022 2328  Last data filed at 7/31/2022 2115  Gross per 24 hour   Intake 100 ml   Output 3325 ml   Net -3225 ml         Exam:    /71   Pulse 98   Temp 97.9 °F (36.6 °C) (Axillary)   Resp 25   Ht 5' 4\" (1.626 m)   Wt 139 lb 15.9 oz (63.5 kg)   SpO2 93%   BMI 24.03 kg/m²     General appearance: Breathing comfortably on Bipap appears stated age and cooperative. HEENT: Pupils equal, round, and reactive to light. Conjunctivae/corneas clear.   Neck: Supple, lung, including probable   pulmonary abscess in the right upper lobe. CT CHEST WO CONTRAST   Final Result   1. Mixed consolidative and ground-glass opacities as well as interstitial   thickening throughout the majority of the right lung compatible with   pneumonia. 2. There is a cavity in the right upper lobe demonstrating an air-fluid level   measuring up to 9.7 cm concerning for abscess formation. 3. Trace right pleural effusion. 4. Prominent and enlarged mediastinal lymph nodes, likely reactive. 5. Stable 6 mm left lower lobe pulmonary nodule. RECOMMENDATIONS:   Fleischner Society guidelines for follow-up and management of incidentally   detected pulmonary nodules:      Single Solid Nodule:      Nodule size less than 6 mm   In a low-risk patient, no routine follow-up. In a high-risk patient, optional CT at 12 months. Nodule size equals 6-8 mm   In a low-risk patient, CT at 6-12 months, then consider CT at 18-24 months. In a high-risk patient, CT at 6-12 months, then CT at 18-24 months. Nodule size greater than 8 mm         In a low-risk patient, consider CT at 3 months, PET/CT, or tissue sampling. In a high-risk patient, consider CT at 3 months, PET/CT, or tissue sampling. Multiple Solid Nodules:      Nodule size less than 6 mm   In a low-risk patient, no routine follow-up. In a high-risk patient, optional CT at 12 months. Nodule size equals 6-8 mm   In a low-risk patient, CT at 3-6 months, then consider CT at 18-24 months. In a high-risk patient, CT at 3-6 months, then CT at 18-24 months. Nodule size greater than 8 mm   In a low-risk patient, CT at 3-6 months, then consider CT at 18-24 months. In a high-risk patient, CT at 3-6 months, then CT at 18-24 months. - Low risk patients include individuals with minimal or absent history of   smoking and other known risk factors.       - High risk patients include individuals with a history or smoking or known   risk factors. Radiology 2017 http://pubs. rsna.org/doi/full/10.1148/radiol. 0801622018         XR CHEST PORTABLE   Final Result   1. Increased pneumonia throughout the right lung remaining most prominent in   the right upper lobe. 2. Emphysema better demonstrated on CT. 3. Possible trace right pleural effusion. XR CHEST PORTABLE   Final Result   Improved aeration of the right chest with persistent consolidation in the mid   to upper right chest.                 Assessment/Plan:    Active Hospital Problems    Diagnosis Date Noted    Peripherally inserted central catheter (PICC) in place [Z45.2] 07/31/2022     Priority: Medium    NSTEMI (non-ST elevated myocardial infarction) ([facility] [I21.4] 07/26/2022     Priority: Medium    N[facility] pneumonia ([facility] [J85.0] 07/26/2022     Priority: Medium    Multifocal pneumonia [J18.9] 07/26/2022     Priority: Medium    Pseudomonas respiratory infection [J98.8, B96.5] 07/26/2022     Priority: Medium    Abnormal CT of the chest [R93.89] 07/26/2022     Priority: Medium    Acute on chronic respiratory failure with hypoxia and hypercapnia (HCC) [J96.21, J96.22] 07/18/2022     Priority: Medium    Abnormal EKG [R94.31] 07/18/2022     Priority: Medium    Chronic obstructive pulmonary disease ([facility] [J44.9]      Priority: Medium    Pneumonia of right lung due to infectious organism [J18.9] 07/03/2022     Priority: Medium    COPD, severe ([facility] [J44.9] 02/24/2017    Chronic pain syndrome [G89.4] 03/14/2016    Hypercholesteremia [E78.00] 11/04/2013    Fibromyalgia [M79.7]     Smoking [F17.200] 08/27/2011     Acute on chronic respiratory failure with hypoxia and hypercapnia (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  Will require proloniged abx for abscess and close follow up visits from NH to doctors offices  Pulmonary and ID following  CT surgery consulted: would need pneumonectomy for which she is a poor candidate --> medical therapy only  Respiratory sputum culture repeated on 7/30     Critical coronary artery disease single-vessel  Status post PCI to left circumflex by Dr Pedro David on this admission  with stent  Now on antiplatelet therapy     Anemia  No evidence of bleeding  Suspect anemia of chronic disease  Will check iron studies TIBC ferritin % and reticulocyte count  No indication to transfuse at this point  ? Benefit to EPO     COPD, severe ([address] - without acute exacerbation. Will provide Nebulizer treatments as needed and continue home medications. Patient will be monitored closely, and deep breathing and coughing will be encouraged while awake. Hyperlipidemia - No current evidence of Rhabdomyolysis or other adverse effects. Continue statin therapy while in the hospital     Chronic pain syndrome -continue her home pain med regimen     Fibromyalgia - provide supportive care    DVT Prophylaxis: heparin  Diet: ADULT DIET; Regular;  Low Sodium (2 gm)  ADULT ORAL NUTRITION SUPPLEMENT; Breakfast, Lunch, Dinner; Standard High Calorie/High Protein Oral Supplement  Code Status: Full Code    PT/OT Eval Status: evaluating    Dispo -PCU, anticipate discharge on Monday after repeat sputum culture    Jordan Petersen MD

## 2022-08-01 NOTE — PLAN OF CARE
Nutrition Problem #1:  (PES of inadequate po intake resolving)  Intervention: Food and/or Nutrient Delivery: Continue Current Diet, Continue Oral Nutrition Supplement

## 2022-08-01 NOTE — PROGRESS NOTES
Hospitalist Progress Note      PCP: Benjamin Mora, APRN - CNP    Date of Admission: 7/18/2022    Chief Complaint: respiratory distress    Hospital Course:      Subjective: Stable on 4-5L. No new complaints      Medications:  Reviewed    Infusion Medications    sodium chloride 5 mL/hr at 07/25/22 0502    dextrose       Scheduled Medications    enoxaparin  40 mg SubCUTAneous Daily    meropenem  1,000 mg IntraVENous Q8H    tobramycin (PF)  300 mg Nebulization BID    sodium chloride  500 mL IntraVENous Once    aspirin  81 mg Oral Daily    nystatin  5 mL Oral 4x Daily    prasugrel  10 mg Oral Daily    sodium chloride flush  5-40 mL IntraVENous 2 times per day    atorvastatin  40 mg Oral Nightly    tiZANidine  4 mg Oral Nightly    pantoprazole  40 mg Oral Daily    gabapentin  600 mg Oral BID    DULoxetine  60 mg Oral BID    insulin lispro  0-12 Units SubCUTAneous TID WC    insulin lispro  0-6 Units SubCUTAneous Nightly    mometasone-formoterol  2 puff Inhalation BID    And    tiotropium  2 puff Inhalation Daily     PRN Meds: sodium chloride, HYDROcodone 5 mg - acetaminophen, sodium chloride flush, sodium chloride, acetaminophen, [Held by provider] oxyCODONE, [Held by provider] HYDROcodone 5 mg - acetaminophen, perflutren lipid microspheres, traZODone, ipratropium-albuterol, cetirizine, albuterol sulfate HFA, glucose, dextrose bolus **OR** dextrose bolus, glucagon (rDNA), dextrose, ondansetron, polyethylene glycol      Intake/Output Summary (Last 24 hours) at 8/1/2022 1542  Last data filed at 8/1/2022 0957  Gross per 24 hour   Intake 150 ml   Output 1750 ml   Net -1600 ml         Exam:    BP (!) 86/56   Pulse (!) 103   Temp 97.6 °F (36.4 °C) (Oral)   Resp 24   Ht 5' 4\" (1.626 m)   Wt 132 lb 15 oz (60.3 kg)   SpO2 94%   BMI 22.82 kg/m²     General appearance: Breathing comfortably on Bipap appears stated age and cooperative. HEENT: Pupils equal, round, and reactive to light.  Conjunctivae/corneas clear.  Neck: Supple, with full range of motion. No jugular venous distention. Trachea midline. Respiratory:  Comfortably on Bipap. Clear to auscultation, bilaterally without Rales/Wheezes/Rhonchi. Cardiovascular: Regular rate and rhythm with normal S1/S2 without murmurs, rubs or gallops. Abdomen: Soft, non-tender, non-distended with normal bowel sounds. Musculoskeletal: No clubbing, cyanosis or edema bilaterally. Full range of motion without deformity. Skin: Skin color, texture, turgor normal.  No rashes or lesions. Neurologic:  Neurovascularly intact without any focal sensory/motor deficits. Cranial nerves: II-XII intact, grossly non-focal.  Psychiatric: Alert and oriented, thought content appropriate, normal insight  Capillary Refill: Brisk,< 3 seconds   Peripheral Pulses: +2 palpable, equal bilaterally       Labs:   Recent Labs     07/30/22 0540 07/31/22 0528 08/01/22  0529   WBC 9.4 7.6 6.7   HGB 8.9* 8.5* 7.9*   HCT 25.7* 25.3* 23.3*   * 615* 591*       Recent Labs     07/30/22  0540 07/31/22  0528 08/01/22  0529   * 135* 136   K 4.4 4.2 4.1   CL 94* 97* 100   CO2 31 32 31   BUN 9 9 8   CREATININE <0.5* <0.5* <0.5*   CALCIUM 8.1* 8.3 7.8*   PHOS 3.2  --   --        No results for input(s): AST, ALT, BILIDIR, BILITOT, ALKPHOS in the last 72 hours. No results for input(s): INR in the last 72 hours. No results for input(s): Jazmyn Comber in the last 72 hours. Urinalysis:      Lab Results   Component Value Date/Time    NITRU Negative 07/22/2022 06:31 AM    WBCUA 2 07/22/2022 06:31 AM    BACTERIA None Seen 07/22/2022 06:31 AM    RBCUA 5 07/22/2022 06:31 AM    BLOODU Negative 07/22/2022 06:31 AM    SPECGRAV 1.049 07/22/2022 06:31 AM    GLUCOSEU Negative 07/22/2022 06:31 AM       Radiology:  XR CHEST PORTABLE   Final Result   Stable multifocal airspace disease in the right lung with probable upper lobe   abscess.          XR CHEST PORTABLE   Final Result   Stable multifocal airspace disease in the right lung, including probable   pulmonary abscess in the right upper lobe. CT CHEST WO CONTRAST   Final Result   1. Mixed consolidative and ground-glass opacities as well as interstitial   thickening throughout the majority of the right lung compatible with   pneumonia. 2. There is a cavity in the right upper lobe demonstrating an air-fluid level   measuring up to 9.7 cm concerning for abscess formation. 3. Trace right pleural effusion. 4. Prominent and enlarged mediastinal lymph nodes, likely reactive. 5. Stable 6 mm left lower lobe pulmonary nodule. RECOMMENDATIONS:   Fleischner Society guidelines for follow-up and management of incidentally   detected pulmonary nodules:      Single Solid Nodule:      Nodule size less than 6 mm   In a low-risk patient, no routine follow-up. In a high-risk patient, optional CT at 12 months. Nodule size equals 6-8 mm   In a low-risk patient, CT at 6-12 months, then consider CT at 18-24 months. In a high-risk patient, CT at 6-12 months, then CT at 18-24 months. Nodule size greater than 8 mm         In a low-risk patient, consider CT at 3 months, PET/CT, or tissue sampling. In a high-risk patient, consider CT at 3 months, PET/CT, or tissue sampling. Multiple Solid Nodules:      Nodule size less than 6 mm   In a low-risk patient, no routine follow-up. In a high-risk patient, optional CT at 12 months. Nodule size equals 6-8 mm   In a low-risk patient, CT at 3-6 months, then consider CT at 18-24 months. In a high-risk patient, CT at 3-6 months, then CT at 18-24 months. Nodule size greater than 8 mm   In a low-risk patient, CT at 3-6 months, then consider CT at 18-24 months. In a high-risk patient, CT at 3-6 months, then CT at 18-24 months. - Low risk patients include individuals with minimal or absent history of   smoking and other known risk factors.       - High risk patients include individuals with a history or smoking or known   risk factors. Radiology 2017 http://pubs. rsna.org/doi/full/10.1148/radiol. 0475185387         XR CHEST PORTABLE   Final Result   1. Increased pneumonia throughout the right lung remaining most prominent in   the right upper lobe. 2. Emphysema better demonstrated on CT. 3. Possible trace right pleural effusion. XR CHEST PORTABLE   Final Result   Improved aeration of the right chest with persistent consolidation in the mid   to upper right chest.                 Assessment/Plan:    Active Hospital Problems    Diagnosis Date Noted    Peripherally inserted central catheter (PICC) in place [Z45.2] 07/31/2022     Priority: Medium    NSTEMI (non-ST elevated myocardial infarction) ([facility] [address] [I21.4] 07/26/2022     Priority: Medium    Necrotizing pneumonia ([facility] [J85.0] 07/26/2022     Priority: Medium    Multifocal pneumonia [J18.9] 07/26/2022     Priority: Medium    Pseudomonas respiratory infection [J98.8, B96.5] 07/26/2022     Priority: Medium    Abnormal CT of the chest [R93.89] 07/26/2022     Priority: Medium    Acute on chronic respiratory failure with hypoxia and hypercapnia (HCC) [J96.21, J96.22] 07/18/2022     Priority: Medium    Abnormal EKG [R94.31] 07/18/2022     Priority: Medium    Chronic obstructive pulmonary disease ([facility] [address] [J44.9]      Priority: Medium    Pneumonia of right lung due to infectious organism [J18.9] 07/03/2022     Priority: Medium    COPD, severe ([facility] [address] [J44.9] 02/24/2017    Chronic pain syndrome [G89.4] 03/14/2016    Hypercholesteremia [E78.00] 11/04/2013    Fibromyalgia [M79.7]     Smoking [F17.200] 08/27/2011     Acute on chronic respiratory failure with hypoxia and hypercapnia (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  Will require proloniged abx for abscess and close follow up visits from NH to doctors offices  Pulmonary and ID following  CT surgery consulted:  would need pneumonectomy for which she is a poor candidate --> medical therapy only  Respiratory sputum culture repeated on 7/30     Critical coronary artery disease single-vessel  Status post PCI to left circumflex by Dr Regla Ding on this admission  with stent  Now on antiplatelet therapy     Anemia  No evidence of bleeding  Suspect anemia of chronic disease  Will check iron studies TIBC ferritin % and reticulocyte count  No indication to transfuse at this point  ? Benefit to EPO     COPD, severe ([address] - without acute exacerbation. Will provide Nebulizer treatments as needed and continue home medications. Patient will be monitored closely, and deep breathing and coughing will be encouraged while awake. Hyperlipidemia - No current evidence of Rhabdomyolysis or other adverse effects. Continue statin therapy while in the hospital     Chronic pain syndrome -continue her home pain med regimen     Fibromyalgia - provide supportive care    DVT Prophylaxis: heparin  Diet: ADULT DIET; Regular;  Low Sodium (2 gm)  ADULT ORAL NUTRITION SUPPLEMENT; Breakfast, Lunch, Dinner; Standard High Calorie/High Protein Oral Supplement  Code Status: Full Code    PT/OT Eval Status: evaluating    Dispo -PCU, waiting for ID to clear patient for D/C    Glendy Small MD

## 2022-08-02 ENCOUNTER — TELEPHONE (OUTPATIENT)
Dept: CASE MANAGEMENT | Age: 77
End: 2022-08-02

## 2022-08-02 LAB
ANION GAP SERPL CALCULATED.3IONS-SCNC: 6 MMOL/L (ref 3–16)
BANDED NEUTROPHILS RELATIVE PERCENT: 6 % (ref 0–7)
BASOPHILS ABSOLUTE: 0 K/UL (ref 0–0.2)
BASOPHILS RELATIVE PERCENT: 0 %
BUN BLDV-MCNC: 9 MG/DL (ref 7–20)
CALCIUM SERPL-MCNC: 8.2 MG/DL (ref 8.3–10.6)
CHLORIDE BLD-SCNC: 100 MMOL/L (ref 99–110)
CO2: 31 MMOL/L (ref 21–32)
CREAT SERPL-MCNC: 0.6 MG/DL (ref 0.6–1.2)
EOSINOPHILS ABSOLUTE: 0.1 K/UL (ref 0–0.6)
EOSINOPHILS RELATIVE PERCENT: 2 %
GFR AFRICAN AMERICAN: >60
GFR NON-AFRICAN AMERICAN: >60
GLUCOSE BLD-MCNC: 106 MG/DL (ref 70–99)
GLUCOSE BLD-MCNC: 110 MG/DL (ref 70–99)
GLUCOSE BLD-MCNC: 117 MG/DL (ref 70–99)
GLUCOSE BLD-MCNC: 138 MG/DL (ref 70–99)
GLUCOSE BLD-MCNC: 139 MG/DL (ref 70–99)
HCT VFR BLD CALC: 22.5 % (ref 36–48)
HEMOGLOBIN: 7.7 G/DL (ref 12–16)
LYMPHOCYTES ABSOLUTE: 0.6 K/UL (ref 1–5.1)
LYMPHOCYTES RELATIVE PERCENT: 9 %
MCH RBC QN AUTO: 30.7 PG (ref 26–34)
MCHC RBC AUTO-ENTMCNC: 34 G/DL (ref 31–36)
MCV RBC AUTO: 90.3 FL (ref 80–100)
MONOCYTES ABSOLUTE: 0.2 K/UL (ref 0–1.3)
MONOCYTES RELATIVE PERCENT: 3 %
NEUTROPHILS ABSOLUTE: 6.1 K/UL (ref 1.7–7.7)
NEUTROPHILS RELATIVE PERCENT: 80 %
PDW BLD-RTO: 14.7 % (ref 12.4–15.4)
PERFORMED ON: ABNORMAL
PLATELET # BLD: 564 K/UL (ref 135–450)
PLATELET SLIDE REVIEW: ABNORMAL
PMV BLD AUTO: 7 FL (ref 5–10.5)
POTASSIUM REFLEX MAGNESIUM: 4.5 MMOL/L (ref 3.5–5.1)
RBC # BLD: 2.49 M/UL (ref 4–5.2)
RBC # BLD: NORMAL 10*6/UL
SARS-COV-2, PCR: NOT DETECTED
SLIDE REVIEW: ABNORMAL
SODIUM BLD-SCNC: 137 MMOL/L (ref 136–145)
WBC # BLD: 7.1 K/UL (ref 4–11)

## 2022-08-02 PROCEDURE — 2580000003 HC RX 258: Performed by: INTERNAL MEDICINE

## 2022-08-02 PROCEDURE — 94669 MECHANICAL CHEST WALL OSCILL: CPT

## 2022-08-02 PROCEDURE — 85025 COMPLETE CBC W/AUTO DIFF WBC: CPT

## 2022-08-02 PROCEDURE — 6360000002 HC RX W HCPCS: Performed by: NURSE PRACTITIONER

## 2022-08-02 PROCEDURE — 6360000002 HC RX W HCPCS: Performed by: INTERNAL MEDICINE

## 2022-08-02 PROCEDURE — 2060000000 HC ICU INTERMEDIATE R&B

## 2022-08-02 PROCEDURE — 6370000000 HC RX 637 (ALT 250 FOR IP): Performed by: NURSE PRACTITIONER

## 2022-08-02 PROCEDURE — 36592 COLLECT BLOOD FROM PICC: CPT

## 2022-08-02 PROCEDURE — 2700000000 HC OXYGEN THERAPY PER DAY

## 2022-08-02 PROCEDURE — 6370000000 HC RX 637 (ALT 250 FOR IP): Performed by: INTERNAL MEDICINE

## 2022-08-02 PROCEDURE — 99231 SBSQ HOSP IP/OBS SF/LOW 25: CPT | Performed by: INTERNAL MEDICINE

## 2022-08-02 PROCEDURE — 94761 N-INVAS EAR/PLS OXIMETRY MLT: CPT

## 2022-08-02 PROCEDURE — 94640 AIRWAY INHALATION TREATMENT: CPT

## 2022-08-02 PROCEDURE — 80048 BASIC METABOLIC PNL TOTAL CA: CPT

## 2022-08-02 PROCEDURE — 9990000010 HC NO CHARGE VISIT: Performed by: PHYSICAL THERAPIST

## 2022-08-02 RX ADMIN — MEROPENEM 1000 MG: 1 INJECTION, POWDER, FOR SOLUTION INTRAVENOUS at 14:19

## 2022-08-02 RX ADMIN — NYSTATIN 500000 UNITS: 100000 SUSPENSION ORAL at 21:05

## 2022-08-02 RX ADMIN — GABAPENTIN 600 MG: 300 CAPSULE ORAL at 08:34

## 2022-08-02 RX ADMIN — MEROPENEM 1000 MG: 1 INJECTION, POWDER, FOR SOLUTION INTRAVENOUS at 05:45

## 2022-08-02 RX ADMIN — ASPIRIN 81 MG: 81 TABLET, COATED ORAL at 08:35

## 2022-08-02 RX ADMIN — TIOTROPIUM BROMIDE INHALATION SPRAY 2 PUFF: 3.12 SPRAY, METERED RESPIRATORY (INHALATION) at 08:02

## 2022-08-02 RX ADMIN — TIZANIDINE 4 MG: 4 TABLET ORAL at 21:05

## 2022-08-02 RX ADMIN — ENOXAPARIN SODIUM 40 MG: 100 INJECTION SUBCUTANEOUS at 08:35

## 2022-08-02 RX ADMIN — HYDROCODONE BITARTRATE AND ACETAMINOPHEN 1 TABLET: 5; 325 TABLET ORAL at 08:35

## 2022-08-02 RX ADMIN — PRASUGREL 10 MG: 10 TABLET, FILM COATED ORAL at 08:38

## 2022-08-02 RX ADMIN — NYSTATIN 500000 UNITS: 100000 SUSPENSION ORAL at 17:43

## 2022-08-02 RX ADMIN — DULOXETINE HYDROCHLORIDE 60 MG: 60 CAPSULE, DELAYED RELEASE ORAL at 21:05

## 2022-08-02 RX ADMIN — MEROPENEM 1000 MG: 1 INJECTION, POWDER, FOR SOLUTION INTRAVENOUS at 21:07

## 2022-08-02 RX ADMIN — MOMETASONE FUROATE AND FORMOTEROL FUMARATE DIHYDRATE 2 PUFF: 200; 5 AEROSOL RESPIRATORY (INHALATION) at 08:04

## 2022-08-02 RX ADMIN — GABAPENTIN 600 MG: 300 CAPSULE ORAL at 21:05

## 2022-08-02 RX ADMIN — MOMETASONE FUROATE AND FORMOTEROL FUMARATE DIHYDRATE 2 PUFF: 200; 5 AEROSOL RESPIRATORY (INHALATION) at 19:46

## 2022-08-02 RX ADMIN — PANTOPRAZOLE SODIUM 40 MG: 40 TABLET, DELAYED RELEASE ORAL at 08:35

## 2022-08-02 RX ADMIN — TOBRAMYCIN 300 MG: 300 SOLUTION RESPIRATORY (INHALATION) at 19:44

## 2022-08-02 RX ADMIN — ATORVASTATIN CALCIUM 40 MG: 40 TABLET, FILM COATED ORAL at 21:05

## 2022-08-02 RX ADMIN — NYSTATIN 500000 UNITS: 100000 SUSPENSION ORAL at 08:35

## 2022-08-02 RX ADMIN — TOBRAMYCIN 300 MG: 300 SOLUTION RESPIRATORY (INHALATION) at 08:07

## 2022-08-02 RX ADMIN — DULOXETINE HYDROCHLORIDE 60 MG: 60 CAPSULE, DELAYED RELEASE ORAL at 08:35

## 2022-08-02 RX ADMIN — HYDROCODONE BITARTRATE AND ACETAMINOPHEN 1 TABLET: 5; 325 TABLET ORAL at 17:42

## 2022-08-02 NOTE — PLAN OF CARE
Problem: Discharge Planning  Goal: Discharge to home or other facility with appropriate resources  Outcome: Progressing     Problem: Pain  Goal: Verbalizes/displays adequate comfort level or baseline comfort level  Outcome: Progressing  Flowsheets (Taken 8/1/2022 1630 by Delwyn Siemens, RN)  Verbalizes/displays adequate comfort level or baseline comfort level: Encourage patient to monitor pain and request assistance     Problem: Confusion  Goal: Confusion, delirium, dementia, or psychosis is improved or at baseline  Description: INTERVENTIONS:  1. Assess for possible contributors to thought disturbance, including medications, impaired vision or hearing, underlying metabolic abnormalities, dehydration, psychiatric diagnoses, and notify attending LIP  2. [place] high risk fall precautions, as indicated  3. Provide frequent short contacts to provide reality reorientation, refocusing and direction  4. Decrease environmental stimuli, including noise as appropriate  5. Monitor and intervene to maintain adequate nutrition, hydration, elimination, sleep and activity  6. If unable to ensure safety without constant attention obtain sitter and review sitter guidelines with assigned personnel  7. Initiate Psychosocial CNS and Spiritual Care consult, as indicated  Outcome: Progressing     Problem: Skin/Tissue Integrity  Goal: Absence of new skin breakdown  Description: 1. Monitor for areas of redness and/or skin breakdown  2. Assess vascular access sites hourly  3. Every 4-6 hours minimum:  Change oxygen saturation probe site  4. Every 4-6 hours:  If on nasal continuous positive airway pressure, respiratory therapy assess nares and determine need for appliance change or resting period.   Outcome: Progressing     Problem: Safety - Adult  Goal: Free from fall injury  Outcome: Progressing     Problem: ABCDS Injury Assessment  Goal: Absence of physical injury  Outcome: Progressing     Problem: Chronic Conditions and Co-morbidities  Goal: Patient's chronic conditions and co-morbidity symptoms are monitored and maintained or improved  Outcome: Progressing     Problem: Nutrition Deficit:  Goal: Optimize nutritional status  8/2/2022 0312 by Héctor Gant RN  Outcome: Progressing  8/1/2022 1456 by Rivka Dang RD, LD  Outcome: Progressing

## 2022-08-02 NOTE — PROGRESS NOTES
Physical Therapy  Taran Loya  [phone]  [id number]    Attempted to see for PT session however pt declined; reports she is waiting for PCA to come in and give her a bath.   Will attempt later as schedule permits  Electronically signed by THANH GERMAIN, PT on 8/2/2022 at 11:03 AM

## 2022-08-02 NOTE — CARE COORDINATION
Awaiting response from ID regarding changing Merrem IV every 8 hours. Skilled nursing facilities cannot accommodate the every 8 hours as it requires an RN to administer via the PICC line. Cannot start precert until this is resolved as we do not officially have an accepting facility.     Tasneem Mooney BSN RN  Case Management  [phone]    Electronically signed by Tasneem Mooney RN on 8/2/2022 at 12:08 PM

## 2022-08-02 NOTE — PROGRESS NOTES
Pulmonary Progress Note    Date of Admission: 7/18/2022   LOS: 15 days       CC:  Chief Complaint   Patient presents with    Shortness of Breath     At [facility] for rehab for pneumonia increased sob over the last few days. Pt on 4-5 lpm via nc all the time. Per EMS the cord was very long ems placed on 6lpm via nc and o2 sat came up to 99%        Subjective:   Awake. Feeling better  Believes will be d/c today     ROS:           Assessment:          Plan: This note may have been transcribed using [id number] [employer]. Please disregard any translational errors. Hospital Day: 15     Pseudomonas pneumonia  Merrem and Arnie  ID following     COPD baseline 4L NC  FEV1 59% 2017  Dulera / Spiriva       Defer d/c to ID. Will need rehab       Data:        PHYSICAL EXAM:   Blood pressure 108/67, pulse (!) 102, temperature 98.2 °F (36.8 °C), resp. rate 22, height 5' 4\" (1.626 m), weight 127 lb 13.9 oz (58 kg), SpO2 93 %, not currently breastfeeding.'  Body mass index is 21.95 kg/m². Gen: No distress. ENT:   Resp: No accessory muscle use. No crackles. No wheezes. No rhonchi. CV: Regular rate. Regular rhythm. No murmur or rub. No edema. Skin: Warm, dry, normal texture and turgor. No nodule on exposed extremities. M/S: No cyanosis. No clubbing. No joint deformity. Psych:  awake.          Medications:    Scheduled Meds:   enoxaparin  40 mg SubCUTAneous Daily    meropenem  1,000 mg IntraVENous Q8H    tobramycin (PF)  300 mg Nebulization BID    sodium chloride  500 mL IntraVENous Once    aspirin  81 mg Oral Daily    nystatin  5 mL Oral 4x Daily    prasugrel  10 mg Oral Daily    sodium chloride flush  5-40 mL IntraVENous 2 times per day    atorvastatin  40 mg Oral Nightly    tiZANidine  4 mg Oral Nightly    pantoprazole  40 mg Oral Daily    gabapentin  600 mg Oral BID    DULoxetine  60 mg Oral BID    insulin lispro  0-12 Units SubCUTAneous TID     insulin lispro  0-6 Units SubCUTAneous Nightly mometasone-formoterol  2 puff Inhalation BID    And    tiotropium  2 puff Inhalation Daily       Continuous Infusions:   sodium chloride 5 mL/hr at 07/25/22 0502    dextrose         PRN Meds:  sodium chloride, HYDROcodone 5 mg - acetaminophen, sodium chloride flush, sodium chloride, acetaminophen, [Held by provider] oxyCODONE, [Held by provider] HYDROcodone 5 mg - acetaminophen, perflutren lipid microspheres, traZODone, ipratropium-albuterol, cetirizine, albuterol sulfate HFA, glucose, dextrose bolus **OR** dextrose bolus, glucagon (rDNA), dextrose, ondansetron, polyethylene glycol    Labs reviewed:  CBC:   Recent Labs     07/31/22 0528 08/01/22 0529 08/02/22  0545   WBC 7.6 6.7 7.1   HGB 8.5* 7.9* 7.7*   HCT 25.3* 23.3* 22.5*   MCV 90.9 90.8 90.3   * 591* 564*       BMP:   Recent Labs     07/31/22 0528 08/01/22 0529 08/02/22  0545   * 136 137   K 4.2 4.1 4.5   CL 97* 100 100   CO2 32 31 31   BUN 9 8 9   CREATININE <0.5* <0.5* 0.6       LIVER PROFILE: No results for input(s): AST, ALT, LIPASE, BILIDIR, BILITOT, ALKPHOS in the last 72 hours. Invalid input(s): AMYLASE,  ALB  PT/INR: No results for input(s): PROTIME, INR in the last 72 hours. APTT: No results for input(s): APTT in the last 72 hours. UA:No results for input(s): NITRITE, COLORU, PHUR, LABCAST, WBCUA, RBCUA, MUCUS, TRICHOMONAS, YEAST, BACTERIA, CLARITYU, SPECGRAV, LEUKOCYTESUR, UROBILINOGEN, BILIRUBINUR, BLOODU, GLUCOSEU, AMORPHOUS in the last 72 hours. Invalid input(s): Alex Ro  No results for input(s): PH, PCO2, PO2 in the last 72 hours. Cx:      Films: This note was transcribed using [id number] [facility]. Please disregard any translational errors.       [address] [facility], Sleep and [facility] [address] [phone]

## 2022-08-02 NOTE — PROGRESS NOTES
Infectious Disease Follow up Notes  Admit Date: 7/18/2022  Hospital Day: 16    Antibiotics :   IV Meropenem  Inhaled Tobramycin     CHIEF COMPLAINT:      Rt UL lung abscess  Severe Necrotizing PNA  Pseudomonas PNA  Hypoxic resp failure      Subjective interval History :  78 y. o.woman with a past medical history of COPD, depression, chronic hypoxic respiratory failure now on   4 L of oxygen via nasal cannula, diabetes recent admission for COPD exacerbation and pneumonia diagnosed to have Pseudomonas based on sputum studies. She was on IV cefepime transition to oral levofloxacin on discharge. She was sent to [address] rehab facility and now admitted because of worsening shortness of breath increased sputum production. Sputum culture again on this admission positive for Pseudomonas with some resistance pattern, it is now become resistant to quinolones. CT chest on this admission with progressive pneumonia right upper lobe cavitary lesion possible lung abscess. Extensive consolidation  see images -  This is a new finding on the CT chest compared to her previous CT chest on 6/20/22. Given the necrotizing pneumonia with Pseudomonas infection and lung abscess formation we are consulted for recommendations. Interval History : Remains on nasal cannula able to go down to 5 L today cough+ c/o some Rt side pain+ sputum no chills tolerating IV abx ok -        Past Medical History:    Past Medical History:   Diagnosis Date    CAD (coronary artery disease) 07/19/2022    NSTEMI, PCI LCx    Chronic pain syndrome     Percocet.  Dr. Samy Hicks    Colorectal polyps     COPD (chronic obstructive pulmonary disease) ([facility] [address]     CTS (carpal tunnel syndrome)     BILATERAL    DDD (degenerative disc disease), lumbar     Depression     Family hx of colon cancer     Fibromyalgia     Hyperlipemia     IBS (irritable bowel syndrome)     Lumbar spondylosis New onset type 2 diabetes mellitus ([facility] [address] 02/03/2020    On home O2     4L    Pneumonia 07/2022    P.aer    Urticaria, chronic        Past Surgical History:    Past Surgical History:   Procedure Laterality Date    CARPAL TUNNEL RELEASE Bilateral     CATARACT EXTRACTION      CERVICAL POLYP REMOVAL  09/2004    CORONARY ANGIOPLASTY WITH STENT PLACEMENT  07/19/2022    LCx 99. PCI/stent. INTRACAPSULAR CATARACT EXTRACTION Left 06/23/2020    Phacoemulsification with intraocular lens implant performed by Shmuel Sims MD at [address] Right 07/14/2020    Phacoemulsification with intraocular lens implant performed by Shmuel Sims MD at [address]      patient denies        Current Medications:    Outpatient Medications Marked as Taking for the 7/18/22 encounter [facility] Encounter)   Medication Sig Dispense Refill    aspirin 81 MG EC tablet Take 1 tablet by mouth in the morning. 30 tablet 3    prasugrel (EFFIENT) 10 MG TABS Take 1 tablet by mouth in the morning. 30 tablet 1       Allergies:  Patient has no known allergies.     Immunizations :   Immunization History   Administered Date(s) Administered    COVID-19, MODERNA BLUE border, Primary or Immunocompromised, (age 12y+), IM, 100 mcg/0.5mL 03/08/2021, 04/05/2021    COVID-19, MODERNA Booster BLUE border, (age 18y+), IM, 50mcg/0.25mL 12/01/2021    Influenza Vaccine, unspecified formulation 01/10/2017    Influenza, High Dose (Fluzone 65 yrs and older) 11/04/2013, 01/21/2016, 09/01/2017, 10/30/2018, 09/18/2020    Influenza, Quadv, adjuvanted, 65 yrs +, IM, PF (Fluad) 09/27/2021    Influenza, Triv, inactivated, subunit, adjuvanted, IM (Fluad 65 yrs and older) 09/13/2019    Pneumococcal Conjugate 13-valent (Spankan75) 01/19/2015    Pneumococcal Conjugate Vaccine 01/10/2017    Pneumococcal Polysaccharide ([id number]) 10/12/2012    Tdap (Boostrix, Adacel) 07/17/2007 Zoster Recombinant (Shingrix) 11/21/2019       Social History:     Social History     Tobacco Use    Smoking status: Every Day     Packs/day: 1.00     Years: 55.00     Pack years: 55.00     Types: Cigarettes     Start date: 1/1/1962    Smokeless tobacco: Never    Tobacco comments:     almost ready to quit   Vaping Use    Vaping Use: Never used   Substance Use Topics    Alcohol use: No     Alcohol/week: 0.0 standard drinks    Drug use: No     Social History     Tobacco Use   Smoking Status Every Day    Packs/day: 1.00    Years: 55.00    Pack years: 55.00    Types: Cigarettes    Start date: 1/1/1962   Smokeless Tobacco Never   Tobacco Comments    almost ready to quit      Family History   Problem Relation Age of Onset    Cancer Father         COLON     Alzheimer's Disease Mother     Heart Disease Brother     Heart Failure Brother     Diabetes Daughter     Diabetes Daughter     Diabetes Daughter           REVIEW OF SYSTEMS:      Constitutional:  negative for fevers, chills, night sweats  Eyes:  negative for blurred vision, eye discharge, visual disturbance   HEENT:  negative for hearing loss, ear drainage,nasal congestion  Respiratory:  r cough++ , shortness of breath + sputum + or hemoptysis  +   Cardiovascular:  negative for chest pain, palpitations, syncope  Gastrointestinal:  negative for nausea, vomiting, diarrhea, constipation, abdominal pain  Genitourinary:  negative for frequency, dysuria, urinary incontinence, hematuria  Hematologic/Lymphatic:  negative for easy bruising, bleeding and lymphadenopathy  Allergic/Immunologic:  negative for recurrent infections, angioedema, anaphylaxis   Endocrine:  negative for weight changes, polyuria, polydipsia and polyphagia  Musculoskeletal:  negative for joint  pain, swelling, decreased range of motion  Integumentary: No rashes, skin lesions  Neurological:  negative for headaches, slurred speech, unilateral weakness  Psychiatric: negative for hallucinations,confusion,agitation.                 PHYSICAL EXAM:      Vitals:    /67   Pulse (!) 102   Temp 98.2 °F (36.8 °C)   Resp 22   Ht 5' 4\" (1.626 m)   Wt 127 lb 13.9 oz (58 kg)   SpO2 93%   BMI 21.95 kg/m²     General Appearance: alert,in some  acute distress, ++  pallor, no icterus  on BIPAP ,   skin changes from smoking ++   Skin: warm and dry, no rash or erythema  Head: normocephalic and atraumatic  Eyes: pupils equal, round, and reactive to light, conjunctivae normal  ENT: tympanic membrane, external ear and ear canal normal bilaterally, nose without deformity, nasal mucosa and turbinates normal without polyps  Neck: supple and non-tender without mass, no thyromegaly  no cervical lymphadenopathy  Pulmonary/Chest:  Rt UL coarse crepts+ BRONCHIAL BREATHING + =-   wheezes, rales or rhonchi, normal air movement, in some respiratory distress  Cardiovascular: normal rate, regular rhythm, normal S1 and S2, no murmurs, rubs, clicks, or gallops, no carotid bruits  Abdomen: soft, non-tender, non-distended, normal bowel sounds, no masses or organomegaly  Extremities: no cyanosis, clubbing or edema  Musculoskeletal: normal range of motion, no joint swelling, deformity or tenderness  Integumentary: No rashes, no abnormal skin lesions, no petechiae  Neurologic: reflexes normal and symmetric, no cranial nerve deficit  Psych:  Orientation, sensorium, mood normal            Lines: PICC     Data Review:    CBC:   Lab Results   Component Value Date    WBC 7.1 08/02/2022    HGB 7.7 (L) 08/02/2022    HCT 22.5 (L) 08/02/2022    MCV 90.3 08/02/2022     (H) 08/02/2022     RENAL:   Lab Results   Component Value Date    CREATININE 0.6 08/02/2022    BUN 9 08/02/2022     08/02/2022    K 4.5 08/02/2022     08/02/2022    CO2 31 08/02/2022     SED RATE: No results found for: SEDRATE  CK: No results found for: CKTOTAL  CRP: No results found for: CRP  Hepatic Function Panel:   Lab Results   Component Value Date/Time    ALKPHOS 88 07/18/2022 05:20 PM    ALT 65 07/18/2022 05:20 PM    AST 61 07/18/2022 05:20 PM    PROT 5.6 07/18/2022 05:20 PM    PROT 6.9 10/12/2012 11:00 AM    BILITOT 0.5 07/18/2022 05:20 PM    LABALBU 2.4 07/18/2022 05:20 PM     UA:  Lab Results   Component Value Date/Time    COLORU Yellow 07/22/2022 06:31 AM    CLARITYU Clear 07/22/2022 06:31 AM    GLUCOSEU Negative 07/22/2022 06:31 AM    BILIRUBINUR Negative 07/22/2022 06:31 AM    BILIRUBINUR neg 10/17/2019 11:50 AM    KETUA TRACE 07/22/2022 06:31 AM    SPECGRAV 1.049 07/22/2022 06:31 AM    BLOODU Negative 07/22/2022 06:31 AM    PHUR 6.5 07/22/2022 06:31 AM    PROTEINU 30 07/22/2022 06:31 AM    UROBILINOGEN 0.2 07/22/2022 06:31 AM    NITRU Negative 07/22/2022 06:31 AM    LEUKOCYTESUR Negative 07/22/2022 06:31 AM    LABMICR YES 07/22/2022 06:31 AM    URINETYPE NotGiven 07/22/2022 06:31 AM      Urine Microscopic:   Lab Results   Component Value Date/Time    LABCAST 10-20 Hyaline 01/10/2017 06:19 PM    BACTERIA None Seen 07/22/2022 06:31 AM    COMU see below 07/03/2022 03:11 PM    HYALCAST 2 07/22/2022 06:31 AM    WBCUA 2 07/22/2022 06:31 AM    RBCUA 5 07/22/2022 06:31 AM    EPIU 1 07/22/2022 06:31 AM     Urine Reflex to Culture:   Lab Results   Component Value Date/Time    URRFLXCULT Not Indicated 07/03/2022 03:11 PM         MICRO: cultures reviewed and updated by me   Blood Culture:          Culture, Respiratory [[id number]] (Abnormal)  Collected: 07/22/22 1200   Order Status: Completed Specimen: Sputum Expectorated Updated: 07/24/22 0801    CULTURE, RESPIRATORY Rare growth normal respiratory fabiana with Abnormal     Gram Stain Result No Epithelial Cells seen   3+ WBC's (Polymorphonuclear)   No organisms seen     Organism Pseudomonas aeruginosa Abnormal     CULTURE, RESPIRATORY Light growth   Narrative:     ORDER#: [id number]                          ORDERED BY: CIRA KEBEDE   SOURCE: Sputum Expectorated                COLLECTED:  07/22/22 12:00 ANTIBIOTICS AT GURWINDER.:                      RECEIVED :  07/22/22 12:22   Respiratory Culture [[id number]] (Abnormal)  Collected: 07/19/22 2030   Order Status: Completed Specimen: Sputum Expectorated Updated: 07/23/22 0748    CULTURE, RESPIRATORY Light growth normal respiratory fabiana with Abnormal     Gram Stain Result 2+ Gram positive cocci   2+ WBC's (Polymorphonuclear)   1+ Epithelial Cells     Organism Pseudomonas aeruginosa Abnormal     CULTURE, RESPIRATORY Light growth   Narrative:     ORDER#: [id number]                          ORDERED BY: FARZANA GAN   SOURCE: Sputum Expectorated                COLLECTED:  07/19/22 20:30   ANTIBIOTICS AT GURWINDER.:                      RECEIVED :  07/19/22 21:19   Culture, Blood 1 [[id number]] Collected: 07/18/22 1841   Order Status: Completed Specimen: Blood Updated: 07/22/22 2015    Blood Culture, Routine No Growth after 4 days of incubation. Narrative:     ORDER#: [id number]                          ORDERED BY: Janki Ocasio   SOURCE: Blood                              COLLECTED:  07/18/22 18:41   ANTIBIOTICS AT GURWINDER.:                      RECEIVED :  07/18/22 18:56   If child <=2 yrs old please draw pediatric bottle. ~Blood Culture 1   Culture, Blood 2 [[id number]] Collected: 07/18/22 1850   Order Status: Completed Specimen: Blood Updated: 07/22/22 2015    Culture, Blood 2 No Growth after 4 days of incubation. Narrative:     ORDER#: [id number]                          ORDERED BY: Janki Ocasio   SOURCE: Blood                              COLLECTED:  07/18/22 18:50   ANTIBIOTICS AT GURWINDER.:                      RECEIVED :  07/18/22 18:56   If child <=2 yrs old please draw pediatric bottle. ~Blood Culture #2   Strep Pneumoniae Antigen [[id number]] Collected: 07/20/22 1020   Order Status: Completed Specimen: Urine, clean catch Updated: 07/21/22 0841    STREP PNEUMONIAE ANTIGEN, URINE --    Presumptive Negative   Presumptive negative suggests no current or recent pneumococcal infection. Infection due to Strep pneumoniae   cannot be ruled out since the antigen present in the sample   may be below the detection limit of the test.   Normal Range:Presumptive Negative    Narrative:     ORDER#: [id number]                          ORDERED BY: FARZANA GAN   SOURCE: Urine Clean Catch                  COLLECTED:  07/20/22 10:20   ANTIBIOTICS AT GURWINDER.:                      RECEIVED :  07/20/22 10:27   Legionella antigen, urine [[id number]] Collected: 07/20/22 1020   Order Status: Completed Specimen: Urine, catheter Updated: 07/21/22 0840    L. pneumophila Serogp 1 Ur Ag --    Presumptive Negative   No Legionella pneumophila serogroup 1 antigens detected. A negative result does not exclude infection with   Legionella pneumophila serogroup 1 nor does it rule out   other microbial-caused respiratory infections or   disease caused by other serogroups of   Legionella pneumophila. Normal Range: Presumptive Negative    Narrative:     ORDER#: [id number]                          ORDERED BY: FARZANA GAN   SOURCE: Urine Catheter                     COLLECTED:  07/20/22 10:20   ANTIBIOTICS AT GURWINDER.:                      RECEIVED :  07/20/22 10:28   Sputum gram stain [[id number]] Collected: 07/19/22 2030   Order Status: Canceled Specimen: Sputum Expectorated    Rapid influenza A/B antigens [[id number]] Collected: 07/19/22 0210   Order Status: Completed Specimen: Nares Updated: 07/19/22 0245    Rapid Influenza A Ag Negative    Rapid Influenza B Ag Negative   COVID-19, Rapid [[id number]] Collected: 07/18/22 1825   Order Status: Completed Specimen: Nasopharyngeal Swab Updated: 07/18/22 1854    SARS-CoV-2, NAAT Not Detected    Comment: Rapid NAAT:   Negative results should be treated as presumptive and,   if inconsistent with clinical signs and symptoms or necessary for   patient management, should be tested with an alternative molecular   assay.  Negative results do not preclude SARS-CoV-2 infection and   should not be used as the sole basis for patient management decisions. This test has been authorized by the FDA under an Emergency Use   Authorization (EUA) for use by authorized laboratories. Fact sheet for Healthcare Providers:   Maykel.prashanth   Fact sheet for Patients: Maykel.prashanth     METHODOLOGY: Isothermal Nucleic Acid Amplification         CULTURE, RESPIRATORY Rare growth normal respiratory fabiana with Abnormal     Gram Stain Result No Epithelial Cells seen   3+ WBC's (Polymorphonuclear)   No organisms seen    Organism Pseudomonas aeruginosa Abnormal     CULTURE, RESPIRATORY Light growth    Resulting Agency [address] [facility]          Susceptibility      Pseudomonas aeruginosa (1)    Antibiotic Interpretation Microscan  Method Status    cefepime Sensitive 8 mcg/mL BACTERIAL SUSCEPTIBILITY PANEL BY TIERRA     ciprofloxacin Resistant >2 mcg/mL BACTERIAL SUSCEPTIBILITY PANEL BY TIERRA     gentamicin Sensitive <=4 mcg/mL BACTERIAL SUSCEPTIBILITY PANEL BY TIERRA     meropenem Sensitive <=1 mcg/mL BACTERIAL SUSCEPTIBILITY PANEL BY TIERRA     piperacillin-tazobactam Sensitive <=16 mcg/mL BACTERIAL SUSCEPTIBILITY PANEL BY TIERRA     tobramycin Sensitive <=4 mcg/mL BACTERIAL SUSCEPTIBILITY PANEL BY TIERRA          Narrative  Performed by: [facility]  ORDER#: [id number]                          ORDERED BY: CIRA KEBEDE   SOURCE: Sputum Expectorated                COLLECTED:  07/22/22 12:00   ANTIBIOTICS AT GURWINDER.:                      RECEIVED :  07/22/22 12:22           Lab Results   Component Value Date/Time    BC No Growth after 4 days of incubation. 07/18/2022 06:41 PM    BLOODCULT2 No Growth after 4 days of incubation.  07/18/2022 06:50 PM       Respiratory Culture:  Lab Results   Component Value Date/Time    CULTRESP Normal respiratory fabiana 07/30/2022 04:00 PM    LABGRAM  07/30/2022 04:00 PM     3+ WBC's (Mononuclear)  1+ Epithelial Cells  1+ Gram positive cocci       AFB:No results found for: AFBSMEAR  Viral Culture:  Lab Results   Component Value Date/Time    COVID19 Not Detected 08/01/2022 01:55 PM     Urine Culture: No results for input(s): Steven Castro in the last 72 hours. IMAGING:    XR CHEST PORTABLE   Final Result   Stable multifocal airspace disease in the right lung with probable upper lobe   abscess. XR CHEST PORTABLE   Final Result   Stable multifocal airspace disease in the right lung, including probable   pulmonary abscess in the right upper lobe. CT CHEST WO CONTRAST   Final Result   1. Mixed consolidative and ground-glass opacities as well as interstitial   thickening throughout the majority of the right lung compatible with   pneumonia. 2. There is a cavity in the right upper lobe demonstrating an air-fluid level   measuring up to 9.7 cm concerning for abscess formation. 3. Trace right pleural effusion. 4. Prominent and enlarged mediastinal lymph nodes, likely reactive. 5. Stable 6 mm left lower lobe pulmonary nodule. RECOMMENDATIONS:   Fleischner Society guidelines for follow-up and management of incidentally   detected pulmonary nodules:      Single Solid Nodule:      Nodule size less than 6 mm   In a low-risk patient, no routine follow-up. In a high-risk patient, optional CT at 12 months. Nodule size equals 6-8 mm   In a low-risk patient, CT at 6-12 months, then consider CT at 18-24 months. In a high-risk patient, CT at 6-12 months, then CT at 18-24 months. Nodule size greater than 8 mm         In a low-risk patient, consider CT at 3 months, PET/CT, or tissue sampling. In a high-risk patient, consider CT at 3 months, PET/CT, or tissue sampling. Multiple Solid Nodules:      Nodule size less than 6 mm   In a low-risk patient, no routine follow-up. In a high-risk patient, optional CT at 12 months.       Nodule size equals 6-8 mm   In a low-risk patient, CT at 3-6 months, then consider CT at 18-24 months. In a high-risk patient, CT at 3-6 months, then CT at 18-24 months. Nodule size greater than 8 mm   In a low-risk patient, CT at 3-6 months, then consider CT at 18-24 months. In a high-risk patient, CT at 3-6 months, then CT at 18-24 months. - Low risk patients include individuals with minimal or absent history of   smoking and other known risk factors. - High risk patients include individuals with a history or smoking or known   risk factors. Radiology 2017 http://pubs. rsna.org/doi/full/10.1148/radiol. 1319775495         XR CHEST PORTABLE   Final Result   1. Increased pneumonia throughout the right lung remaining most prominent in   the right upper lobe. 2. Emphysema better demonstrated on CT. 3. Possible trace right pleural effusion. XR CHEST PORTABLE   Final Result   Improved aeration of the right chest with persistent consolidation in the mid   to upper right chest.         CT CHEST WO CONTRAST    (Results Pending)     XR CHEST PORTABLE   Final Result   1. Increased pneumonia throughout the right lung remaining most prominent in   the right upper lobe. 2. Emphysema better demonstrated on CT. 3. Possible trace right pleural effusion. XR CHEST PORTABLE   Final Result   Improved aeration of the right chest with persistent consolidation in the mid   to upper right chest.                All pertinent images and reports for the current Hospitalization were reviewed by me.        Scheduled Meds:   enoxaparin  40 mg SubCUTAneous Daily    meropenem  1,000 mg IntraVENous Q8H    tobramycin (PF)  300 mg Nebulization BID    sodium chloride  500 mL IntraVENous Once    aspirin  81 mg Oral Daily    nystatin  5 mL Oral 4x Daily    prasugrel  10 mg Oral Daily    sodium chloride flush  5-40 mL IntraVENous 2 times per day    atorvastatin  40 mg Oral Nightly    tiZANidine  4 mg Oral Nightly    pantoprazole  40 mg Oral Daily    gabapentin  600 mg Oral BID DULoxetine  60 mg Oral BID    insulin lispro  0-12 Units SubCUTAneous TID WC    insulin lispro  0-6 Units SubCUTAneous Nightly    mometasone-formoterol  2 puff Inhalation BID    And    tiotropium  2 puff Inhalation Daily       Continuous Infusions:   sodium chloride 5 mL/hr at 07/25/22 0502    dextrose         PRN Meds:  sodium chloride, HYDROcodone 5 mg - acetaminophen, sodium chloride flush, sodium chloride, acetaminophen, [Held by provider] oxyCODONE, [Held by provider] HYDROcodone 5 mg - acetaminophen, perflutren lipid microspheres, traZODone, ipratropium-albuterol, cetirizine, albuterol sulfate HFA, glucose, dextrose bolus **OR** dextrose bolus, glucagon (rDNA), dextrose, ondansetron, polyethylene glycol      Assessment:     Patient Active Problem List   Diagnosis    Osteopenia-last dexa 2009 repeat 2015 (-2.4 hip)--advised tx    Colon polyp, hyperplastic,-(done 3/11-repeat 3-5 yrs--dr Fito Mckenzie)    Family history of colon cancer    CTS (carpal tunnel syndrome)BILATERAL-s/p surgical repair--resolved     Postmenopausal status-last mammogram 2/15 wnl last pap 12/13--wnl    Nondependent alcohol abuse, in remission    Urticaria, chronic    Smoking    Chronic back pain-(djd) saw dr Mar Morales afford surgery--seeing dr Erich Prader for pain meds    Fibromyalgia    Hypercholesteremia    Lumbar spondylosis    Vitamin D deficiency--severe--started 50,000 iu 2x/ wk 11/13    Insomnia--on elevil w help    Constipation--on daily miralax    Depression    Chronic pain syndrome    Muscle cramping    Acute on chronic respiratory failure with hypercapnia (HCC)    ROLY (acute kidney injury) ([address]    Severe sepsis (HCC)    Gastroesophageal reflux disease    COPD, severe ([address]    Chronic hypoxemic respiratory failure ([address]    Degeneration of lumbar or lumbosacral intervertebral disc    Trochanteric bursitis of right hip    COPD exacerbation ([address]    Diabetes ([address]    Controlled type 2 diabetes mellitus without complication, without long-term current use of insulin (HCC)    Age-related osteoporosis without current pathological fracture- started alendronate 9/2021    Pulmonary nodule seen on imaging study    Pneumonia of right lung due to infectious organism    General weakness    Elevated lactic acid level    Community acquired pneumonia of right lung    Chronic obstructive pulmonary disease (HCC)    Acute respiratory failure with hypoxia (HCC)    Acute on chronic respiratory failure with hypoxia and hypercapnia (HCC)    Abnormal EKG    NSTEMI (non-ST elevated myocardial infarction) (HCC)    Necrotizing pneumonia (HCC)    Abscess of upper lobe of right lung with pneumonia (HCC)    Pseudomonas respiratory infection    Abnormal CT of the chest    Peripherally inserted central catheter (PICC) in place     Severe necrotizing Pseudomonas pneumonia  Right upper lobe cavitary lesion  Right right lung evolving abscess  Right lung dense consolidation of  Pseudomonas pneumonia developing some resistance  COPD exacerbation  Hypoxic respiratory failure  Coronary artery disease  Status post cardiac cath  Chronic smoking  Abnormal CT chest  BNP elevation  COVID-19 negative        Unfortunately she developed progressive changes with dense consolidation and lung abscess secondary to Pseudomonas pneumonia. Pseudomonas appears to have developed resistance to quinolones while on therapy this is concerning. CT chest on this admission grossly abnormal and definitely there is progressive changes given the severity of the -pneumonia will need IV antibiotics     OPAT d/w pt she needs to QUIT smoking d/w pt and her family      unfortunately still has Severe hypoxia and respiratory distress required transfer to ICU now on BiPAP. Heart rate is elevated secondary to respiratory distress-. Not able to come off BiPAP for extended period of time transferred out to progressive care unit.     Given the lung findings will have to continue antibiotic therapy anticipate least 4 more weeks of duration of treatment     repeat sputum testing to see if Pseudomonas is clearing out on the current therapy-sputum repeat Normal fabiana noted      C/o Rt side pain check CXR today d/w pt      Labs, Microbiology, Radiology and all the pertinent results from current hospitalization and  care every where were reviewed  by me as a part of the evaluation   Plan:   IV Meropenem 1 gm q 8 HR X stop date  x  8/27  Inh Tobramycin Neb Q 12 hRS as in patient   Picc line placed   OPAT d/w pt  QUIT smoking  Will repeat CT chest in  3 weeks orders in place   Cont supportive care  CT images reviewed see above    Risk for progression and intubation high   Was on  BIPAP for resp failure  now tx to PCU -currently using 5 L of nasal cannula  11. Repeat sputum clearing up  12. Robby DONE for d/c planning  - d/c when ok with primary   13. CXR portable Rt side pain per patient    Would like her to be on IV Meropenem Q 8 HR dosing if possible as she has severe Lung infection -          Discussed with patient/Family and Nursing   Risk of Complications/Morbidity: High      Illness(es)/ Infection present that pose threat to bodily function. There is potential for severe exacerbation of infection/side effects of treatment. Therapy requires intensive monitoring for antimicrobial agent toxicity. Thanks for allowing me to participate in your patient's care and please call me with any questions or concerns.     Dannie Little MD  Infectious Disease  [facility] Physician  Phone: [phone]   Fax : [phone]

## 2022-08-02 NOTE — TELEPHONE ENCOUNTER
CT Chest completed on 6/20/2022 and 7/23/2022 while patient was admitted for pneumonia in the hospital. Pulmonology [facility] following patient during her hospital stay. Patient has scheduled follow-up visit with cardiologist on 8/15/2022.

## 2022-08-02 NOTE — PLAN OF CARE
Continuing to work with patient and health care team on discharge plan. Discharge instructions and medication management will be reviewed prior to discharge. Pt able to express presence/absence of pain and rate pain appropriately using numerical scale. Pain/discomfort being managed with PRN analgesics per MD orders (see MAR). Pain assessed every shift and after interventions. Skin assessment performed each shift per protocol. Patient turned and repositioned every two hours and prn with pillow support. Patient checked for incontence every two hours.      Problem: Chronic Conditions and Co-morbidities  Goal: Patient's chronic conditions and co-morbidity symptoms are monitored and maintained or improved  Outcome: Progressing     Problem: Safety - Adult  Goal: Free from fall injury  Outcome: Progressing  Flowsheets (Taken 8/2/2022 1533)  Free From Fall Injury: Instruct family/caregiver on patient safety

## 2022-08-03 ENCOUNTER — APPOINTMENT (OUTPATIENT)
Dept: GENERAL RADIOLOGY | Age: 77
DRG: 246 | End: 2022-08-03
Payer: MEDICARE

## 2022-08-03 PROBLEM — R04.2 MASSIVE HEMOPTYSIS: Status: ACTIVE | Noted: 2022-08-03

## 2022-08-03 PROBLEM — R04.0 EPISTAXIS: Status: ACTIVE | Noted: 2022-08-03

## 2022-08-03 LAB
ANION GAP SERPL CALCULATED.3IONS-SCNC: 7 MMOL/L (ref 3–16)
BANDED NEUTROPHILS RELATIVE PERCENT: 1 % (ref 0–7)
BASE EXCESS ARTERIAL: 3.1 MMOL/L (ref -3–3)
BASOPHILS ABSOLUTE: 0.1 K/UL (ref 0–0.2)
BASOPHILS RELATIVE PERCENT: 1 %
BUN BLDV-MCNC: 10 MG/DL (ref 7–20)
CALCIUM SERPL-MCNC: 8.3 MG/DL (ref 8.3–10.6)
CARBOXYHEMOGLOBIN ARTERIAL: 1.1 % (ref 0–1.5)
CHLORIDE BLD-SCNC: 100 MMOL/L (ref 99–110)
CO2: 29 MMOL/L (ref 21–32)
CREAT SERPL-MCNC: <0.5 MG/DL (ref 0.6–1.2)
EOSINOPHILS ABSOLUTE: 0.2 K/UL (ref 0–0.6)
EOSINOPHILS RELATIVE PERCENT: 3 %
GFR AFRICAN AMERICAN: >60
GFR NON-AFRICAN AMERICAN: >60
GLUCOSE BLD-MCNC: 102 MG/DL (ref 70–99)
GLUCOSE BLD-MCNC: 102 MG/DL (ref 70–99)
GLUCOSE BLD-MCNC: 98 MG/DL (ref 70–99)
HCO3 ARTERIAL: 28.7 MMOL/L (ref 21–29)
HCT VFR BLD CALC: 22.9 % (ref 36–48)
HEMOGLOBIN, ART, EXTENDED: 9.6 G/DL (ref 12–16)
HEMOGLOBIN: 7.7 G/DL (ref 12–16)
LYMPHOCYTES ABSOLUTE: 1.1 K/UL (ref 1–5.1)
LYMPHOCYTES RELATIVE PERCENT: 17 %
MCH RBC QN AUTO: 30.4 PG (ref 26–34)
MCHC RBC AUTO-ENTMCNC: 33.5 G/DL (ref 31–36)
MCV RBC AUTO: 90.7 FL (ref 80–100)
METHEMOGLOBIN ARTERIAL: 0.5 %
MONOCYTES ABSOLUTE: 0.3 K/UL (ref 0–1.3)
MONOCYTES RELATIVE PERCENT: 4 %
NEUTROPHILS ABSOLUTE: 5 K/UL (ref 1.7–7.7)
NEUTROPHILS RELATIVE PERCENT: 74 %
O2 SAT, ARTERIAL: 96 %
O2 THERAPY: ABNORMAL
PCO2 ARTERIAL: 48.5 MMHG (ref 35–45)
PDW BLD-RTO: 14.5 % (ref 12.4–15.4)
PERFORMED ON: ABNORMAL
PERFORMED ON: ABNORMAL
PH ARTERIAL: 7.38 (ref 7.35–7.45)
PLATELET # BLD: 559 K/UL (ref 135–450)
PMV BLD AUTO: 6.8 FL (ref 5–10.5)
PO2 ARTERIAL: 85.5 MMHG (ref 75–108)
POLYCHROMASIA: ABNORMAL
POTASSIUM REFLEX MAGNESIUM: 4.1 MMOL/L (ref 3.5–5.1)
RBC # BLD: 2.52 M/UL (ref 4–5.2)
SODIUM BLD-SCNC: 136 MMOL/L (ref 136–145)
TCO2 ARTERIAL: 30.2 MMOL/L
WBC # BLD: 6.6 K/UL (ref 4–11)

## 2022-08-03 PROCEDURE — 99152 MOD SED SAME PHYS/QHP 5/>YRS: CPT | Performed by: INTERNAL MEDICINE

## 2022-08-03 PROCEDURE — 6360000002 HC RX W HCPCS

## 2022-08-03 PROCEDURE — 2000000000 HC ICU R&B

## 2022-08-03 PROCEDURE — 6370000000 HC RX 637 (ALT 250 FOR IP): Performed by: NURSE PRACTITIONER

## 2022-08-03 PROCEDURE — 6360000002 HC RX W HCPCS: Performed by: INTERNAL MEDICINE

## 2022-08-03 PROCEDURE — 2700000000 HC OXYGEN THERAPY PER DAY

## 2022-08-03 PROCEDURE — 2500000003 HC RX 250 WO HCPCS: Performed by: NURSE PRACTITIONER

## 2022-08-03 PROCEDURE — 94761 N-INVAS EAR/PLS OXIMETRY MLT: CPT

## 2022-08-03 PROCEDURE — 6370000000 HC RX 637 (ALT 250 FOR IP): Performed by: INTERNAL MEDICINE

## 2022-08-03 PROCEDURE — 99231 SBSQ HOSP IP/OBS SF/LOW 25: CPT | Performed by: INTERNAL MEDICINE

## 2022-08-03 PROCEDURE — 94002 VENT MGMT INPAT INIT DAY: CPT

## 2022-08-03 PROCEDURE — 2580000003 HC RX 258: Performed by: NURSE PRACTITIONER

## 2022-08-03 PROCEDURE — 9990000010 HC NO CHARGE VISIT: Performed by: PHYSICAL THERAPIST

## 2022-08-03 PROCEDURE — 71045 X-RAY EXAM CHEST 1 VIEW: CPT

## 2022-08-03 PROCEDURE — 2709999900 HC NON-CHARGEABLE SUPPLY: Performed by: INTERNAL MEDICINE

## 2022-08-03 PROCEDURE — 85025 COMPLETE CBC W/AUTO DIFF WBC: CPT

## 2022-08-03 PROCEDURE — 31622 DX BRONCHOSCOPE/WASH: CPT | Performed by: INTERNAL MEDICINE

## 2022-08-03 PROCEDURE — 99233 SBSQ HOSP IP/OBS HIGH 50: CPT | Performed by: INTERNAL MEDICINE

## 2022-08-03 PROCEDURE — 94669 MECHANICAL CHEST WALL OSCILL: CPT

## 2022-08-03 PROCEDURE — 2580000003 HC RX 258: Performed by: INTERNAL MEDICINE

## 2022-08-03 PROCEDURE — 3609027000 HC BRONCHOSCOPY: Performed by: INTERNAL MEDICINE

## 2022-08-03 PROCEDURE — 30903 CONTROL OF NOSEBLEED: CPT | Performed by: OTOLARYNGOLOGY

## 2022-08-03 PROCEDURE — 94640 AIRWAY INHALATION TREATMENT: CPT

## 2022-08-03 PROCEDURE — 31500 INSERT EMERGENCY AIRWAY: CPT | Performed by: INTERNAL MEDICINE

## 2022-08-03 PROCEDURE — 0BJ08ZZ INSPECTION OF TRACHEOBRONCHIAL TREE, VIA NATURAL OR ARTIFICIAL OPENING ENDOSCOPIC: ICD-10-PCS | Performed by: INTERNAL MEDICINE

## 2022-08-03 PROCEDURE — 82803 BLOOD GASES ANY COMBINATION: CPT

## 2022-08-03 PROCEDURE — 6360000002 HC RX W HCPCS: Performed by: NURSE PRACTITIONER

## 2022-08-03 PROCEDURE — 99222 1ST HOSP IP/OBS MODERATE 55: CPT | Performed by: OTOLARYNGOLOGY

## 2022-08-03 PROCEDURE — 36592 COLLECT BLOOD FROM PICC: CPT

## 2022-08-03 PROCEDURE — 80048 BASIC METABOLIC PNL TOTAL CA: CPT

## 2022-08-03 RX ORDER — FLUCONAZOLE 100 MG/1
100 TABLET ORAL DAILY
Status: DISCONTINUED | OUTPATIENT
Start: 2022-08-03 | End: 2022-08-11

## 2022-08-03 RX ORDER — CHLORHEXIDINE GLUCONATE 0.12 MG/ML
15 RINSE ORAL 2 TIMES DAILY
Status: DISCONTINUED | OUTPATIENT
Start: 2022-08-03 | End: 2022-08-04

## 2022-08-03 RX ORDER — FENTANYL CITRATE-0.9 % NACL/PF 10 MCG/ML
50 PLASTIC BAG, INJECTION (ML) INTRAVENOUS EVERY 30 MIN PRN
Status: DISCONTINUED | OUTPATIENT
Start: 2022-08-03 | End: 2022-08-04

## 2022-08-03 RX ORDER — PROPOFOL 10 MG/ML
5-80 INJECTION, EMULSION INTRAVENOUS CONTINUOUS
Status: DISCONTINUED | OUTPATIENT
Start: 2022-08-03 | End: 2022-08-04

## 2022-08-03 RX ORDER — FENTANYL CITRATE-0.9 % NACL/PF 10 MCG/ML
25-200 PLASTIC BAG, INJECTION (ML) INTRAVENOUS CONTINUOUS
Status: DISCONTINUED | OUTPATIENT
Start: 2022-08-03 | End: 2022-08-04

## 2022-08-03 RX ORDER — OXYMETAZOLINE HYDROCHLORIDE 0.05 G/100ML
2 SPRAY NASAL 2 TIMES DAILY
Status: DISCONTINUED | OUTPATIENT
Start: 2022-08-03 | End: 2022-08-04

## 2022-08-03 RX ORDER — 0.9 % SODIUM CHLORIDE 0.9 %
500 INTRAVENOUS SOLUTION INTRAVENOUS ONCE
Status: COMPLETED | OUTPATIENT
Start: 2022-08-03 | End: 2022-08-03

## 2022-08-03 RX ORDER — FENTANYL CITRATE 50 UG/ML
INJECTION, SOLUTION INTRAMUSCULAR; INTRAVENOUS
Status: DISCONTINUED
Start: 2022-08-03 | End: 2022-08-04

## 2022-08-03 RX ORDER — PROPOFOL 10 MG/ML
INJECTION, EMULSION INTRAVENOUS
Status: COMPLETED
Start: 2022-08-03 | End: 2022-08-03

## 2022-08-03 RX ORDER — SODIUM CHLORIDE 9 MG/ML
INJECTION, SOLUTION INTRAVENOUS PRN
Status: DISCONTINUED | OUTPATIENT
Start: 2022-08-03 | End: 2022-08-03

## 2022-08-03 RX ADMIN — MOMETASONE FUROATE AND FORMOTEROL FUMARATE DIHYDRATE 2 PUFF: 200; 5 AEROSOL RESPIRATORY (INHALATION) at 07:59

## 2022-08-03 RX ADMIN — ATORVASTATIN CALCIUM 40 MG: 40 TABLET, FILM COATED ORAL at 22:36

## 2022-08-03 RX ADMIN — PANTOPRAZOLE SODIUM 40 MG: 40 TABLET, DELAYED RELEASE ORAL at 08:22

## 2022-08-03 RX ADMIN — ALBUTEROL SULFATE 2 PUFF: 90 AEROSOL, METERED RESPIRATORY (INHALATION) at 07:59

## 2022-08-03 RX ADMIN — ENOXAPARIN SODIUM 40 MG: 100 INJECTION SUBCUTANEOUS at 08:22

## 2022-08-03 RX ADMIN — HYDROCODONE BITARTRATE AND ACETAMINOPHEN 1 TABLET: 5; 325 TABLET ORAL at 05:45

## 2022-08-03 RX ADMIN — NYSTATIN 500000 UNITS: 100000 SUSPENSION ORAL at 14:49

## 2022-08-03 RX ADMIN — Medication 500 ML: at 00:33

## 2022-08-03 RX ADMIN — MEROPENEM 1000 MG: 1 INJECTION, POWDER, FOR SOLUTION INTRAVENOUS at 22:07

## 2022-08-03 RX ADMIN — SODIUM CHLORIDE, PRESERVATIVE FREE 10 ML: 5 INJECTION INTRAVENOUS at 10:39

## 2022-08-03 RX ADMIN — GABAPENTIN 600 MG: 300 CAPSULE ORAL at 22:35

## 2022-08-03 RX ADMIN — ASPIRIN 81 MG: 81 TABLET, COATED ORAL at 08:22

## 2022-08-03 RX ADMIN — NYSTATIN 500000 UNITS: 100000 SUSPENSION ORAL at 08:22

## 2022-08-03 RX ADMIN — TIZANIDINE 4 MG: 4 TABLET ORAL at 22:36

## 2022-08-03 RX ADMIN — FLUCONAZOLE 100 MG: 100 TABLET ORAL at 22:36

## 2022-08-03 RX ADMIN — DULOXETINE HYDROCHLORIDE 60 MG: 60 CAPSULE, DELAYED RELEASE ORAL at 08:22

## 2022-08-03 RX ADMIN — GABAPENTIN 600 MG: 300 CAPSULE ORAL at 08:22

## 2022-08-03 RX ADMIN — SODIUM CHLORIDE 500 ML: 9 INJECTION, SOLUTION INTRAVENOUS at 00:34

## 2022-08-03 RX ADMIN — PRASUGREL 10 MG: 10 TABLET, FILM COATED ORAL at 08:22

## 2022-08-03 RX ADMIN — NYSTATIN 500000 UNITS: 100000 SUSPENSION ORAL at 21:07

## 2022-08-03 RX ADMIN — SODIUM CHLORIDE, PRESERVATIVE FREE 10 ML: 5 INJECTION INTRAVENOUS at 21:07

## 2022-08-03 RX ADMIN — Medication 20 MG: at 21:08

## 2022-08-03 RX ADMIN — PROPOFOL 50 MCG/KG/MIN: 10 INJECTION, EMULSION INTRAVENOUS at 12:46

## 2022-08-03 RX ADMIN — MOMETASONE FUROATE AND FORMOTEROL FUMARATE DIHYDRATE 2 PUFF: 200; 5 AEROSOL RESPIRATORY (INHALATION) at 19:45

## 2022-08-03 RX ADMIN — 0.12% CHLORHEXIDINE GLUCONATE 15 ML: 1.2 RINSE ORAL at 14:49

## 2022-08-03 RX ADMIN — TOBRAMYCIN 300 MG: 300 SOLUTION RESPIRATORY (INHALATION) at 20:29

## 2022-08-03 RX ADMIN — 0.12% CHLORHEXIDINE GLUCONATE 15 ML: 1.2 RINSE ORAL at 21:07

## 2022-08-03 RX ADMIN — MEROPENEM 1000 MG: 1 INJECTION, POWDER, FOR SOLUTION INTRAVENOUS at 05:41

## 2022-08-03 RX ADMIN — PROPOFOL 40 MCG/KG/MIN: 10 INJECTION, EMULSION INTRAVENOUS at 17:58

## 2022-08-03 RX ADMIN — NYSTATIN 500000 UNITS: 100000 SUSPENSION ORAL at 18:16

## 2022-08-03 RX ADMIN — TOBRAMYCIN 300 MG: 300 SOLUTION RESPIRATORY (INHALATION) at 07:58

## 2022-08-03 RX ADMIN — Medication 200 MCG/HR: at 12:45

## 2022-08-03 RX ADMIN — MEROPENEM 1000 MG: 1 INJECTION, POWDER, FOR SOLUTION INTRAVENOUS at 14:48

## 2022-08-03 RX ADMIN — TIOTROPIUM BROMIDE INHALATION SPRAY 2 PUFF: 3.12 SPRAY, METERED RESPIRATORY (INHALATION) at 07:59

## 2022-08-03 RX ADMIN — Medication 150 MCG/HR: at 16:28

## 2022-08-03 ASSESSMENT — PULMONARY FUNCTION TESTS
PIF_VALUE: 27
PIF_VALUE: 29
PIF_VALUE: 23
PIF_VALUE: 29
PIF_VALUE: 25
PIF_VALUE: 23
PIF_VALUE: 23
PIF_VALUE: 27
PIF_VALUE: 24
PIF_VALUE: 23
PIF_VALUE: 43
PIF_VALUE: 24
PIF_VALUE: 23
PIF_VALUE: 28
PIF_VALUE: 23
PIF_VALUE: 23
PIF_VALUE: 27
PIF_VALUE: 24
PIF_VALUE: 23
PIF_VALUE: 25
PIF_VALUE: 23
PIF_VALUE: 23
PIF_VALUE: 27
PIF_VALUE: 23
PIF_VALUE: 27
PIF_VALUE: 22
PIF_VALUE: 27
PIF_VALUE: 27
PIF_VALUE: 23
PIF_VALUE: 28
PIF_VALUE: 23
PIF_VALUE: 24
PIF_VALUE: 27
PIF_VALUE: 24
PIF_VALUE: 23
PIF_VALUE: 23
PIF_VALUE: 27
PIF_VALUE: 24
PIF_VALUE: 21
PIF_VALUE: 24
PIF_VALUE: 22
PIF_VALUE: 25
PIF_VALUE: 23
PIF_VALUE: 23
PIF_VALUE: 24
PIF_VALUE: 28
PIF_VALUE: 28
PIF_VALUE: 23
PIF_VALUE: 24

## 2022-08-03 ASSESSMENT — PAIN SCALES - GENERAL
PAINLEVEL_OUTOF10: 0
PAINLEVEL_OUTOF10: 8
PAINLEVEL_OUTOF10: 0
PAINLEVEL_OUTOF10: 0

## 2022-08-03 ASSESSMENT — PAIN DESCRIPTION - ONSET: ONSET: AWAKENED FROM SLEEP

## 2022-08-03 ASSESSMENT — PAIN DESCRIPTION - FREQUENCY: FREQUENCY: CONTINUOUS

## 2022-08-03 ASSESSMENT — PAIN - FUNCTIONAL ASSESSMENT: PAIN_FUNCTIONAL_ASSESSMENT: ACTIVITIES ARE NOT PREVENTED

## 2022-08-03 ASSESSMENT — PAIN DESCRIPTION - PAIN TYPE: TYPE: CHRONIC PAIN

## 2022-08-03 ASSESSMENT — PAIN DESCRIPTION - LOCATION: LOCATION: BACK

## 2022-08-03 ASSESSMENT — PAIN DESCRIPTION - DESCRIPTORS: DESCRIPTORS: ACHING

## 2022-08-03 ASSESSMENT — PAIN DESCRIPTION - ORIENTATION: ORIENTATION: LOWER

## 2022-08-03 NOTE — PROGRESS NOTES
Paged primary to make aware pt has a severe yeast infection that was noticed to be worse than earlier while doing tay care/terrell care. Awaiting orders.

## 2022-08-03 NOTE — PROGRESS NOTES
Pulmonary Progress Note    Date of Admission: 7/18/2022   LOS: 16 days       CC:  Chief Complaint   Patient presents with    Shortness of Breath     At [place] for rehab for pneumonia increased sob over the last few days. Pt on 4-5 lpm via nc all the time. Per EMS the cord was very long ems placed on 6lpm via nc and o2 sat came up to 99%        Subjective:  D/c with NH set up     ROS:           Assessment:          Plan: This note may have been transcribed using [id number] [address]. Please disregard any translational errors. Hospital Day: 16     Pseudomonas pneumonia  Merrem and Arnie  ID following     COPD baseline 4L NC  FEV1 59% 2017  Mayrse Honeycutt / Frances       Will sign off at this time. Thanks for the consult. Please call with questions. Data:        PHYSICAL EXAM:   Blood pressure 128/77, pulse 95, temperature 97.7 °F (36.5 °C), temperature source Oral, resp. rate 19, height 5' 4\" (1.626 m), weight 130 lb 11.7 oz (59.3 kg), SpO2 93 %, not currently breastfeeding.'  Body mass index is 22.44 kg/m². Gen: No distress. ENT:   Resp: No accessory muscle use. No crackles. No wheezes. No rhonchi. CV: Regular rate. Regular rhythm. No murmur or rub. No edema. Skin: Warm, dry, normal texture and turgor. No nodule on exposed extremities. M/S: No cyanosis. No clubbing. No joint deformity. Psych:  awake.          Medications:    Scheduled Meds:   enoxaparin  40 mg SubCUTAneous Daily    meropenem  1,000 mg IntraVENous Q8H    tobramycin (PF)  300 mg Nebulization BID    sodium chloride  500 mL IntraVENous Once    aspirin  81 mg Oral Daily    nystatin  5 mL Oral 4x Daily    prasugrel  10 mg Oral Daily    sodium chloride flush  5-40 mL IntraVENous 2 times per day    atorvastatin  40 mg Oral Nightly    tiZANidine  4 mg Oral Nightly    pantoprazole  40 mg Oral Daily    gabapentin  600 mg Oral BID    DULoxetine  60 mg Oral BID    insulin lispro  0-12 Units SubCUTAneous TID     insulin lispro  0-6 Units SubCUTAneous Nightly    mometasone-formoterol  2 puff Inhalation BID    And    tiotropium  2 puff Inhalation Daily       Continuous Infusions:   sodium chloride 5 mL/hr at 07/25/22 0502    dextrose         PRN Meds:  sodium chloride, HYDROcodone 5 mg - acetaminophen, sodium chloride flush, sodium chloride, acetaminophen, [Held by provider] oxyCODONE, [Held by provider] HYDROcodone 5 mg - acetaminophen, perflutren lipid microspheres, traZODone, ipratropium-albuterol, cetirizine, albuterol sulfate HFA, glucose, dextrose bolus **OR** dextrose bolus, glucagon (rDNA), dextrose, ondansetron, polyethylene glycol    Labs reviewed:  CBC:   Recent Labs     08/01/22 0529 08/02/22  0545 08/03/22  0540   WBC 6.7 7.1 6.6   HGB 7.9* 7.7* 7.7*   HCT 23.3* 22.5* 22.9*   MCV 90.8 90.3 90.7   * 564* 559*       BMP:   Recent Labs     08/01/22 0529 08/02/22  0545 08/03/22  0540    137 136   K 4.1 4.5 4.1    100 100   CO2 31 31 29   BUN 8 9 10   CREATININE <0.5* 0.6 <0.5*       LIVER PROFILE: No results for input(s): AST, ALT, LIPASE, BILIDIR, BILITOT, ALKPHOS in the last 72 hours. Invalid input(s): AMYLASE,  ALB  PT/INR: No results for input(s): PROTIME, INR in the last 72 hours. APTT: No results for input(s): APTT in the last 72 hours. UA:No results for input(s): NITRITE, COLORU, PHUR, LABCAST, WBCUA, RBCUA, MUCUS, TRICHOMONAS, YEAST, BACTERIA, CLARITYU, SPECGRAV, LEUKOCYTESUR, UROBILINOGEN, BILIRUBINUR, BLOODU, GLUCOSEU, AMORPHOUS in the last 72 hours. Invalid input(s): Skeet Beatrice  No results for input(s): PH, PCO2, PO2 in the last 72 hours. Cx:      Films: This note was transcribed using [id number] [facility]. Please disregard any translational errors.       [phone]

## 2022-08-03 NOTE — PROGRESS NOTES
Occupational Therapy    Pt with medical decline and transferred to ICU and now intubated. Please re -order therapy when pt extubated and stable to participate in therapy.     Electronically signed by Michelle Johnson OT on 8/3/2022 at 1:07 PM

## 2022-08-03 NOTE — PROGRESS NOTES
(degenerative disc disease), lumbar     Depression     Family hx of colon cancer     Fibromyalgia     Hyperlipemia     IBS (irritable bowel syndrome)     Lumbar spondylosis     New onset type 2 diabetes mellitus ([facility] [address] 02/03/2020    On home O2     4L    Pneumonia 07/2022    P.aer    Urticaria, chronic        Past Surgical History:    Past Surgical History:   Procedure Laterality Date    CARPAL TUNNEL RELEASE Bilateral     CATARACT EXTRACTION      CERVICAL POLYP REMOVAL  09/2004    CORONARY ANGIOPLASTY WITH STENT PLACEMENT  07/19/2022    LCx 99. PCI/stent. INTRACAPSULAR CATARACT EXTRACTION Left 06/23/2020    Phacoemulsification with intraocular lens implant performed by Libby Castellon MD at [address] Right 07/14/2020    Phacoemulsification with intraocular lens implant performed by Libby Castellon MD at [address]      patient denies        Current Medications:    Outpatient Medications Marked as Taking for the 7/18/22 encounter [facility] Encounter)   Medication Sig Dispense Refill    aspirin 81 MG EC tablet Take 1 tablet by mouth in the morning. 30 tablet 3    prasugrel (EFFIENT) 10 MG TABS Take 1 tablet by mouth in the morning. 30 tablet 1       Allergies:  Patient has no known allergies.     Immunizations :   Immunization History   Administered Date(s) Administered    COVID-19, MODERNA BLUE border, Primary or Immunocompromised, (age 12y+), IM, 100 mcg/0.5mL 03/08/2021, 04/05/2021    COVID-19, MODERNA Booster BLUE border, (age 18y+), IM, 50mcg/0.25mL 12/01/2021    Influenza Vaccine, unspecified formulation 01/10/2017    Influenza, High Dose (Fluzone 65 yrs and older) 11/04/2013, 01/21/2016, 09/01/2017, 10/30/2018, 09/18/2020    Influenza, Quadv, adjuvanted, 65 yrs +, IM, PF (Fluad) 09/27/2021    Influenza, Triv, inactivated, subunit, adjuvanted, IM (Fluad 65 yrs and older) 09/13/2019    Pneumococcal Conjugate 13-valent ([id number]) 01/19/2015    Pneumococcal Conjugate Vaccine 01/10/2017    Pneumococcal Polysaccharide ([id number]) 10/12/2012    Tdap (Boostrix, Adacel) 07/17/2007    Zoster Recombinant (Shingrix) 11/21/2019       Social History:     Social History     Tobacco Use    Smoking status: Every Day     Packs/day: 1.00     Years: 55.00     Pack years: 55.00     Types: Cigarettes     Start date: 1/1/1962    Smokeless tobacco: Never    Tobacco comments:     almost ready to quit   Vaping Use    Vaping Use: Never used   Substance Use Topics    Alcohol use: No     Alcohol/week: 0.0 standard drinks    Drug use: No     Social History     Tobacco Use   Smoking Status Every Day    Packs/day: 1.00    Years: 55.00    Pack years: 55.00    Types: Cigarettes    Start date: 1/1/1962   Smokeless Tobacco Never   Tobacco Comments    almost ready to quit      Family History   Problem Relation Age of Onset    Cancer Father         COLON     Alzheimer's Disease Mother     Heart Disease Brother     Heart Failure Brother     Diabetes Daughter     Diabetes Daughter     Diabetes Daughter           REVIEW OF SYSTEMS:      Not possible due to intubation and sedation               PHYSICAL EXAM:      Vitals:    /77   Pulse 95   Temp 97.7 °F (36.5 °C) (Oral)   Resp 19   Ht 5' 4\" (1.626 m)   Wt 130 lb 11.7 oz (59.3 kg)   SpO2 93%   BMI 22.44 kg/m²     General Appearance: Intubated sedated on the ventilator acute distress, ++  pallor,Epistaxis from Left nostril    Skin: warm and dry, no rash or erythema  Head: normocephalic and atraumatic  Eyes: pupils equal, round, and reactive to light, conjunctivae normal  ENT: tympanic membrane, external ear and ear canal normal bilaterally, nose without deformity, nasal mucosa and turbinates normal without polyps  Neck: supple and non-tender without mass, no thyromegaly  no cervical lymphadenopathy  Pulmonary/Chest:  Rt UL coarse crepts+ BRONCHIAL BREATHING + =-   wheezes, rales or rhonchi, AM      Urine Microscopic:   Lab Results   Component Value Date/Time    LABCAST 10-20 Hyaline 01/10/2017 06:19 PM    BACTERIA None Seen 07/22/2022 06:31 AM    COMU see below 07/03/2022 03:11 PM    HYALCAST 2 07/22/2022 06:31 AM    WBCUA 2 07/22/2022 06:31 AM    RBCUA 5 07/22/2022 06:31 AM    EPIU 1 07/22/2022 06:31 AM     Urine Reflex to Culture:   Lab Results   Component Value Date/Time    URRFLXCULT Not Indicated 07/03/2022 03:11 PM         MICRO: cultures reviewed and updated by me   Blood Culture:          Culture, Respiratory [[id number]] (Abnormal)  Collected: 07/22/22 1200   Order Status: Completed Specimen: Sputum Expectorated Updated: 07/24/22 0801    CULTURE, RESPIRATORY Rare growth normal respiratory fabiana with Abnormal     Gram Stain Result No Epithelial Cells seen   3+ WBC's (Polymorphonuclear)   No organisms seen     Organism Pseudomonas aeruginosa Abnormal     CULTURE, RESPIRATORY Light growth   Narrative:     ORDER#: [id number]                          ORDERED BY: CIRA KEBEDE   SOURCE: Sputum Expectorated                COLLECTED:  07/22/22 12:00   ANTIBIOTICS AT GURWINDER.:                      RECEIVED :  07/22/22 12:22   Respiratory Culture [[id number]] (Abnormal)  Collected: 07/19/22 2030   Order Status: Completed Specimen: Sputum Expectorated Updated: 07/23/22 0748    CULTURE, RESPIRATORY Light growth normal respiratory fabiana with Abnormal     Gram Stain Result 2+ Gram positive cocci   2+ WBC's (Polymorphonuclear)   1+ Epithelial Cells     Organism Pseudomonas aeruginosa Abnormal     CULTURE, RESPIRATORY Light growth   Narrative:     ORDER#: [id number]                          ORDERED BY: FARZANA GAN   SOURCE: Sputum Expectorated                COLLECTED:  07/19/22 20:30   ANTIBIOTICS AT GURWINDER.:                      RECEIVED :  07/19/22 21:19   Culture, Blood 1 [[id number]] Collected: 07/18/22 1841   Order Status: Completed Specimen: Blood Updated: 07/22/22 2015    Blood Culture, Routine No Growth after 4 days of incubation. Narrative:     ORDER#: [id number]                          ORDERED BY: Neli Fishidy   SOURCE: Blood                              COLLECTED:  07/18/22 18:41   ANTIBIOTICS AT GURWINDER.:                      RECEIVED :  07/18/22 18:56   If child <=2 yrs old please draw pediatric bottle. ~Blood Culture 1   Culture, Blood 2 [[id number]] Collected: 07/18/22 1850   Order Status: Completed Specimen: Blood Updated: 07/22/22 2015    Culture, Blood 2 No Growth after 4 days of incubation. Narrative:     ORDER#: [id number]                          ORDERED BY: Venia Kinney   SOURCE: Blood                              COLLECTED:  07/18/22 18:50   ANTIBIOTICS AT GURWINDER.:                      RECEIVED :  07/18/22 18:56   If child <=2 yrs old please draw pediatric bottle. ~Blood Culture #2   Strep Pneumoniae Antigen [[id number]] Collected: 07/20/22 1020   Order Status: Completed Specimen: Urine, clean catch Updated: 07/21/22 0841    STREP PNEUMONIAE ANTIGEN, URINE --    Presumptive Negative   Presumptive negative suggests no current or recent   pneumococcal infection. Infection due to Strep pneumoniae   cannot be ruled out since the antigen present in the sample   may be below the detection limit of the test.   Normal Range:Presumptive Negative    Narrative:     ORDER#: [id number]                          ORDERED BY: FARZANA GAN   SOURCE: Urine Clean Catch                  COLLECTED:  07/20/22 10:20   ANTIBIOTICS AT GURWINDER.:                      RECEIVED :  07/20/22 10:27   Legionella antigen, urine [[id number]] Collected: 07/20/22 1020   Order Status: Completed Specimen: Urine, catheter Updated: 07/21/22 0840    L. pneumophila Serogp 1 Ur Ag --    Presumptive Negative   No Legionella pneumophila serogroup 1 antigens detected.    A negative result does not exclude infection with   Legionella pneumophila serogroup 1 nor does it rule out   other microbial-caused respiratory infections or   disease caused by other serogroups of   Legionella pneumophila. Normal Range: Presumptive Negative    Narrative:     ORDER#: [id number]                          ORDERED BY: FARZANA GAN   SOURCE: Urine Catheter                     COLLECTED:  07/20/22 10:20   ANTIBIOTICS AT GURWINDER.:                      RECEIVED :  07/20/22 10:28   Sputum gram stain [[id number]] Collected: 07/19/22 2030   Order Status: Canceled Specimen: Sputum Expectorated    Rapid influenza A/B antigens [[id number]] Collected: 07/19/22 0210   Order Status: Completed Specimen: Nares Updated: 07/19/22 0245    Rapid Influenza A Ag Negative    Rapid Influenza B Ag Negative   COVID-19, Rapid [[id number]] Collected: 07/18/22 1825   Order Status: Completed Specimen: Nasopharyngeal Swab Updated: 07/18/22 1854    SARS-CoV-2, NAAT Not Detected    Comment: Rapid NAAT:   Negative results should be treated as presumptive and,   if inconsistent with clinical signs and symptoms or necessary for   patient management, should be tested with an alternative molecular   assay. Negative results do not preclude SARS-CoV-2 infection and   should not be used as the sole basis for patient management decisions. This test has been authorized by the FDA under an Emergency Use   Authorization (EUA) for use by authorized laboratories.      Fact sheet for Healthcare Providers:   BuildHer.es   Fact sheet for Patients: BuildHer.es     METHODOLOGY: Isothermal Nucleic Acid Amplification         CULTURE, RESPIRATORY Rare growth normal respiratory fabiana with Abnormal     Gram Stain Result No Epithelial Cells seen   3+ WBC's (Polymorphonuclear)   No organisms seen    Organism Pseudomonas aeruginosa Abnormal     CULTURE, RESPIRATORY Light growth    Resulting Agency [address] [facility]          Susceptibility      Pseudomonas aeruginosa (1)    Antibiotic Interpretation Microscan  Method Status    cefepime Sensitive 8 mcg/mL BACTERIAL SUSCEPTIBILITY PANEL BY TIERRA     ciprofloxacin Resistant >2 mcg/mL BACTERIAL SUSCEPTIBILITY PANEL BY TIERRA     gentamicin Sensitive <=4 mcg/mL BACTERIAL SUSCEPTIBILITY PANEL BY TIERRA     meropenem Sensitive <=1 mcg/mL BACTERIAL SUSCEPTIBILITY PANEL BY TIERRA     piperacillin-tazobactam Sensitive <=16 mcg/mL BACTERIAL SUSCEPTIBILITY PANEL BY TIERRA     tobramycin Sensitive <=4 mcg/mL BACTERIAL SUSCEPTIBILITY PANEL BY TIERRA          Narrative  Performed by: [facility]  ORDER#: [id number]                          ORDERED BY: CIRA KEBEDE   SOURCE: Sputum Expectorated                COLLECTED:  07/22/22 12:00   ANTIBIOTICS AT GURWINDER.:                      RECEIVED :  07/22/22 12:22           Lab Results   Component Value Date/Time    BC No Growth after 4 days of incubation. 07/18/2022 06:41 PM    BLOODCULT2 No Growth after 4 days of incubation. 07/18/2022 06:50 PM       Respiratory Culture:  Lab Results   Component Value Date/Time    CULTRESP Normal respiratory fabiana 07/30/2022 04:00 PM    LABGRAM  07/30/2022 04:00 PM     3+ WBC's (Mononuclear)  1+ Epithelial Cells  1+ Gram positive cocci       AFB:No results found for: AFBSMEAR  Viral Culture:  Lab Results   Component Value Date/Time    COVID19 Not Detected 08/01/2022 01:55 PM     Urine Culture: No results for input(s): LABURIN in the last 72 hours. IMAGING:    XR CHEST PORTABLE   Final Result   Unchanged multifocal right-sided pneumonia. XR CHEST PORTABLE   Final Result   Stable multifocal airspace disease in the right lung with probable upper lobe   abscess. XR CHEST PORTABLE   Final Result   Stable multifocal airspace disease in the right lung, including probable   pulmonary abscess in the right upper lobe. CT CHEST WO CONTRAST   Final Result   1. Mixed consolidative and ground-glass opacities as well as interstitial   thickening throughout the majority of the right lung compatible with   pneumonia.    2. There is a cavity in the right upper lobe demonstrating an air-fluid level   measuring up to 9.7 cm concerning for abscess formation. 3. Trace right pleural effusion. 4. Prominent and enlarged mediastinal lymph nodes, likely reactive. 5. Stable 6 mm left lower lobe pulmonary nodule. RECOMMENDATIONS:   Fleischner Society guidelines for follow-up and management of incidentally   detected pulmonary nodules:      Single Solid Nodule:      Nodule size less than 6 mm   In a low-risk patient, no routine follow-up. In a high-risk patient, optional CT at 12 months. Nodule size equals 6-8 mm   In a low-risk patient, CT at 6-12 months, then consider CT at 18-24 months. In a high-risk patient, CT at 6-12 months, then CT at 18-24 months. Nodule size greater than 8 mm         In a low-risk patient, consider CT at 3 months, PET/CT, or tissue sampling. In a high-risk patient, consider CT at 3 months, PET/CT, or tissue sampling. Multiple Solid Nodules:      Nodule size less than 6 mm   In a low-risk patient, no routine follow-up. In a high-risk patient, optional CT at 12 months. Nodule size equals 6-8 mm   In a low-risk patient, CT at 3-6 months, then consider CT at 18-24 months. In a high-risk patient, CT at 3-6 months, then CT at 18-24 months. Nodule size greater than 8 mm   In a low-risk patient, CT at 3-6 months, then consider CT at 18-24 months. In a high-risk patient, CT at 3-6 months, then CT at 18-24 months. - Low risk patients include individuals with minimal or absent history of   smoking and other known risk factors. - High risk patients include individuals with a history or smoking or known   risk factors. Radiology 2017 http://pubs. rsna.org/doi/full/10.1148/radiol. 8535105317         XR CHEST PORTABLE   Final Result   1. Increased pneumonia throughout the right lung remaining most prominent in   the right upper lobe. 2. Emphysema better demonstrated on CT.    3. Possible trace right pleural effusion. XR CHEST PORTABLE   Final Result   Improved aeration of the right chest with persistent consolidation in the mid   to upper right chest.         CT CHEST WO CONTRAST    (Results Pending)     XR CHEST PORTABLE   Final Result   1. Increased pneumonia throughout the right lung remaining most prominent in   the right upper lobe. 2. Emphysema better demonstrated on CT. 3. Possible trace right pleural effusion. XR CHEST PORTABLE   Final Result   Improved aeration of the right chest with persistent consolidation in the mid   to upper right chest.                All pertinent images and reports for the current Hospitalization were reviewed by me.        Scheduled Meds:   enoxaparin  40 mg SubCUTAneous Daily    meropenem  1,000 mg IntraVENous Q8H    tobramycin (PF)  300 mg Nebulization BID    sodium chloride  500 mL IntraVENous Once    aspirin  81 mg Oral Daily    nystatin  5 mL Oral 4x Daily    prasugrel  10 mg Oral Daily    sodium chloride flush  5-40 mL IntraVENous 2 times per day    atorvastatin  40 mg Oral Nightly    tiZANidine  4 mg Oral Nightly    pantoprazole  40 mg Oral Daily    gabapentin  600 mg Oral BID    DULoxetine  60 mg Oral BID    insulin lispro  0-12 Units SubCUTAneous TID WC    insulin lispro  0-6 Units SubCUTAneous Nightly    mometasone-formoterol  2 puff Inhalation BID    And    tiotropium  2 puff Inhalation Daily       Continuous Infusions:   sodium chloride 5 mL/hr at 07/25/22 0502    dextrose         PRN Meds:  sodium chloride, HYDROcodone 5 mg - acetaminophen, sodium chloride flush, sodium chloride, acetaminophen, [Held by provider] oxyCODONE, [Held by provider] HYDROcodone 5 mg - acetaminophen, perflutren lipid microspheres, traZODone, ipratropium-albuterol, cetirizine, albuterol sulfate HFA, glucose, dextrose bolus **OR** dextrose bolus, glucagon (rDNA), dextrose, ondansetron, polyethylene glycol      Assessment:     Patient Active Problem List   Diagnosis [facility] dexa 2009 repeat 2015 (-2.4 hip)--advised tx    Colon polyp, hyperplastic,-(done 3/11-repeat 3-5 yrs--dr Goran Wahl)    Family history of colon cancer    CTS (carpal tunnel syndrome)BILATERAL-s/p surgical repair--resolved     Postmenopausal status-last mammogram 2/15 wnl last pap 12/13--wnl    Nondependent alcohol abuse, in remission    Urticaria, chronic    Smoking    Chronic back pain-(djd) saw dr Seymuor Horan afford surgery--seeing dr Marilyn Blake for pain meds    Fibromyalgia    Hypercholesteremia    Lumbar spondylosis    Vitamin D deficiency--severe--started 50,000 iu 2x/ wk 11/13    Insomnia--on elevil w help    Constipation--on daily miralax    Depression    Chronic pain syndrome    Muscle cramping    Acute on chronic respiratory failure with hypercapnia (HCC)    ROLY (acute kidney injury) ([address]    Severe sepsis (HCC)    Gastroesophageal reflux disease    COPD, severe ([address]    Chronic hypoxemic respiratory failure ([address]    Degeneration of lumbar or lumbosacral intervertebral disc    Trochanteric bursitis of right hip    COPD exacerbation ([address]    Diabetes ([address]    Controlled type 2 diabetes mellitus without complication, without long-term current use of insulin ([address]    Age-related osteoporosis without current pathological fracture- started alendronate 9/2021    Pulmonary nodule seen on imaging study    Pneumonia of right lung due to infectious organism    General weakness    Elevated lactic acid level    Community acquired pneumonia of right lung    Chronic obstructive pulmonary disease ([address]    Acute respiratory failure with hypoxia (HCC)    Acute on chronic respiratory failure with hypoxia and hypercapnia (HCC)    Abnormal EKG    NSTEMI (non-ST elevated myocardial infarction) ([address]    Necrotizing pneumonia (HCC)    Abscess of upper lobe of right lung with pneumonia (HCC)    Pseudomonas respiratory infection    Abnormal CT of the chest    Peripherally inserted central catheter (PICC) in place Severe necrotizing Pseudomonas pneumonia  Right upper lobe cavitary lesion  Right right lung evolving abscess  Right lung dense consolidation of  Pseudomonas pneumonia developing some resistance  COPD exacerbation  Hypoxic respiratory failure  Coronary artery disease  Status post cardiac cath  Chronic smoking  Abnormal CT chest  BNP elevation  COVID-19 negative  Epistaxis severe vs Hemoptysis now intubated sedated on the ventilator for t airway protection 8/3/22       Unfortunately she developed progressive changes with dense consolidation and lung abscess secondary to Pseudomonas pneumonia. Pseudomonas appears to have developed resistance to quinolones while on therapy this is concerning. CT chest on this admission grossly abnormal and definitely there is progressive changes given the severity of the -pneumonia will need IV antibiotics     OPAT d/w pt she needs to QUIT smoking d/w pt and her family      unfortunately still has Severe hypoxia and respiratory distress required transfer to ICU now on BiPAP. Heart rate is elevated secondary to respiratory distress-. Not able to come off BiPAP for extended period of time transferred out to progressive care unit.     Given the lung findings will have to continue antibiotic therapy anticipate least 4 more weeks of duration of treatment     repeat sputum testing to see if Pseudomonas is clearing out on the current therapy-sputum repeat Normal fabiana noted      Now transferred to ICU secondary to massive epistaxis versus hemoptysis intubated on the ventilator status post bronchoscopy      Labs, Microbiology, Radiology and all the pertinent results from current hospitalization and  care every where were reviewed  by me as a part of the evaluation   Plan:   Cont  IV Meropenem 1 gm q 8 HR X stop date  x  8/27  May hold  Tobramycin Neb until bleeding is controlled    Picc line placed   OPAT d/w pt  QUIT smoking  Will repeat CT chest in  3 weeks orders in place   Cont supportive care  CT images reviewed see above    Risk for progression and intubation high   Was on  BIPAP for resp failure  now tx to PCU -currently using 5 L of nasal cannula  11. Repeat sputum clearing up  12. Robby DONE for d/c planning  - d/c when ok with primary   13. Now intubated on the vent and sedated d/w ICU team            Discussed with patient/Family and Nursing   Risk of Complications/Morbidity: High      Illness(es)/ Infection present that pose threat to bodily function. There is potential for severe exacerbation of infection/side effects of treatment. Therapy requires intensive monitoring for antimicrobial agent toxicity. Thanks for allowing me to participate in your patient's care and please call me with any questions or concerns.     Carry Leroy NUNES  Infectious Disease  [facility] ([facility]) Physician  Phone: [phone]   Fax : [phone]

## 2022-08-03 NOTE — PROGRESS NOTES
Physical Therapy  Ramesh العلي  [id number]  [id number]  Pt transferred to ICU; will need new orders to resume therapy once medically able to participate  Electronically signed by THANH GERMAIN PT on 8/3/2022 at 12:36 PM

## 2022-08-03 NOTE — PROGRESS NOTES
Pt called this nurse and asked if I would come in her room, upon entering I noticed pt with bloody tissues, pt stated that she was coughing and all of a sudden blood started coming out.  Md russell notified, vs taken, this nurse stayed at bedside until icu bed available, daughter called and notified my md, pt remains calm, alert and oriented, pt transferred to icu bed 2105 per this nurse, and charge nurse

## 2022-08-03 NOTE — PLAN OF CARE
Problem: Discharge Planning  Goal: Discharge to home or other facility with appropriate resources  8/3/2022 1851 by Nelly Agrawal RN  Outcome: Progressing  8/3/2022 1351 by Marvin Red RN  Outcome: Progressing     Problem: Pain  Goal: Verbalizes/displays adequate comfort level or baseline comfort level  8/3/2022 1851 by Nelly Agrawal RN  Outcome: Progressing  8/3/2022 1351 by Marvin Red RN  Outcome: Progressing     Problem: Confusion  Goal: Confusion, delirium, dementia, or psychosis is improved or at baseline  Description: INTERVENTIONS:  1. Assess for possible contributors to thought disturbance, including medications, impaired vision or hearing, underlying metabolic abnormalities, dehydration, psychiatric diagnoses, and notify attending LIP  2. [place] high risk fall precautions, as indicated  3. Provide frequent short contacts to provide reality reorientation, refocusing and direction  4. Decrease environmental stimuli, including noise as appropriate  5. Monitor and intervene to maintain adequate nutrition, hydration, elimination, sleep and activity  6. If unable to ensure safety without constant attention obtain sitter and review sitter guidelines with assigned personnel  7. Initiate Psychosocial CNS and Spiritual Care consult, as indicated  8/3/2022 1851 by Nelly Agrawal RN  Outcome: Progressing  8/3/2022 1351 by Marvin Red RN  Outcome: Progressing     Problem: Skin/Tissue Integrity  Goal: Absence of new skin breakdown  Description: 1. Monitor for areas of redness and/or skin breakdown  2. Assess vascular access sites hourly  3. Every 4-6 hours minimum:  Change oxygen saturation probe site  4. Every 4-6 hours:  If on nasal continuous positive airway pressure, respiratory therapy assess nares and determine need for appliance change or resting period.   8/3/2022 1851 by Nelly Agrawal RN  Outcome: Progressing  8/3/2022 1351 by Marvin Red RN  Outcome: Progressing     Problem: Safety - Adult  Goal: Free from fall injury  8/3/2022 1851 by Osvaldo Ness RN  Outcome: Progressing  8/3/2022 1351 by Pineda Booker RN  Outcome: Progressing     Problem: ABCDS Injury Assessment  Goal: Absence of physical injury  8/3/2022 1851 by Osvaldo Ness RN  Outcome: Progressing  8/3/2022 1351 by Pineda Booker RN  Outcome: Progressing     Problem: Chronic Conditions and Co-morbidities  Goal: Patient's chronic conditions and co-morbidity symptoms are monitored and maintained or improved  8/3/2022 1851 by Osvaldo Ness RN  Outcome: Progressing  8/3/2022 1351 by Pineda Booker RN  Outcome: Progressing     Problem: Nutrition Deficit:  Goal: Optimize nutritional status  8/3/2022 1851 by Osvaldo Ness RN  Outcome: Progressing  8/3/2022 1351 by Pineda Booker RN  Outcome: Progressing     Problem: Safety - Medical Restraint  Goal: Remains free of injury from restraints (Restraint for Interference with Medical Device)  Description: INTERVENTIONS:  1. Determine that other, less restrictive measures have been tried or would not be effective before applying the restraint  2. Evaluate the patient's condition at the time of restraint application  3. Inform patient/family regarding the reason for restraint  4.  Q2H: Monitor safety, psychosocial status, comfort, nutrition and hydration  8/3/2022 1851 by Osvaldo Ness RN  Outcome: Progressing  8/3/2022 1351 by Pineda Booker RN  Outcome: Progressing

## 2022-08-03 NOTE — PLAN OF CARE
Problem: Chronic Conditions and Co-morbidities  Goal: Patient's chronic conditions and co-morbidity symptoms are monitored and maintained or improved  8/3/2022 0049 by Yamile Bazan RN  Outcome: Not Progressing     Problem: Discharge Planning  Goal: Discharge to home or other facility with appropriate resources  8/3/2022 0049 by Yamile Bazan RN  Outcome: Progressing     Problem: Pain  Goal: Verbalizes/displays adequate comfort level or baseline comfort level  8/3/2022 0049 by Yamile Bazan RN  Outcome: Progressing     Problem: Confusion  Goal: Confusion, delirium, dementia, or psychosis is improved or at baseline  Description: INTERVENTIONS:  1. Assess for possible contributors to thought disturbance, including medications, impaired vision or hearing, underlying metabolic abnormalities, dehydration, psychiatric diagnoses, and notify attending LIP  2. [place] high risk fall precautions, as indicated  3. Provide frequent short contacts to provide reality reorientation, refocusing and direction  4. Decrease environmental stimuli, including noise as appropriate  5. Monitor and intervene to maintain adequate nutrition, hydration, elimination, sleep and activity  6. If unable to ensure safety without constant attention obtain sitter and review sitter guidelines with assigned personnel  7. Initiate Psychosocial CNS and Spiritual Care consult, as indicated  8/3/2022 0049 by Yamile Bazan RN  Outcome: Progressing     Problem: Skin/Tissue Integrity  Goal: Absence of new skin breakdown  Description: 1. Monitor for areas of redness and/or skin breakdown  2. Assess vascular access sites hourly  3. Every 4-6 hours minimum:  Change oxygen saturation probe site  4. Every 4-6 hours:  If on nasal continuous positive airway pressure, respiratory therapy assess nares and determine need for appliance change or resting period.   8/3/2022 0049 by Yamile Bazan RN  Outcome: Progressing     Problem: Safety - Adult  Goal: Free from fall injury  8/3/2022 0049 by Shalom Bocanegra RN  Outcome: Progressing     Problem: ABCDS Injury Assessment  Goal: Absence of physical injury  8/3/2022 0049 by Shalom Bocanegra RN  Outcome: Progressing     Problem: Nutrition Deficit:  Goal: Optimize nutritional status  8/3/2022 0049 by Shalom Bocanegra RN  Outcome: Progressing     Problem: Chronic Conditions and Co-morbidities  Goal: Patient's chronic conditions and co-morbidity symptoms are monitored and maintained or improved  8/3/2022 0049 by Shalom Bocanegra RN  Outcome: Not Progressing  8/2/2022 1819 by Luz Elena Holland RN  Outcome: Progressing

## 2022-08-03 NOTE — PROGRESS NOTES
Hospitalist Progress Note      PCP: Dayna Saleh APRN - CNP    Date of Admission: 7/18/2022    Chief Complaint: respiratory distress    Hospital Course:      Subjective: Stable on 4-5L. Breathing at baseline      Medications:  Reviewed    Infusion Medications    sodium chloride 5 mL/hr at 07/25/22 0502    dextrose       Scheduled Medications    enoxaparin  40 mg SubCUTAneous Daily    meropenem  1,000 mg IntraVENous Q8H    tobramycin (PF)  300 mg Nebulization BID    sodium chloride  500 mL IntraVENous Once    aspirin  81 mg Oral Daily    nystatin  5 mL Oral 4x Daily    prasugrel  10 mg Oral Daily    sodium chloride flush  5-40 mL IntraVENous 2 times per day    atorvastatin  40 mg Oral Nightly    tiZANidine  4 mg Oral Nightly    pantoprazole  40 mg Oral Daily    gabapentin  600 mg Oral BID    DULoxetine  60 mg Oral BID    insulin lispro  0-12 Units SubCUTAneous TID WC    insulin lispro  0-6 Units SubCUTAneous Nightly    mometasone-formoterol  2 puff Inhalation BID    And    tiotropium  2 puff Inhalation Daily     PRN Meds: sodium chloride, HYDROcodone 5 mg - acetaminophen, sodium chloride flush, sodium chloride, acetaminophen, [Held by provider] oxyCODONE, [Held by provider] HYDROcodone 5 mg - acetaminophen, perflutren lipid microspheres, traZODone, ipratropium-albuterol, cetirizine, albuterol sulfate HFA, glucose, dextrose bolus **OR** dextrose bolus, glucagon (rDNA), dextrose, ondansetron, polyethylene glycol      Intake/Output Summary (Last 24 hours) at 8/2/2022 2202  Last data filed at 8/2/2022 1848  Gross per 24 hour   Intake 620 ml   Output 1625 ml   Net -1005 ml         Exam:    BP 97/60   Pulse (!) 105   Temp 98.5 °F (36.9 °C) (Oral)   Resp 22   Ht 5' 4\" (1.626 m)   Wt 127 lb 13.9 oz (58 kg)   SpO2 95%   BMI 21.95 kg/m²     General appearance: Breathing comfortably on Bipap appears stated age and cooperative. HEENT: Pupils equal, round, and reactive to light.  Conjunctivae/corneas clear.  Neck: Supple, with full range of motion. No jugular venous distention. Trachea midline. Respiratory:  Comfortably on Bipap. Clear to auscultation, bilaterally without Rales/Wheezes/Rhonchi. Cardiovascular: Regular rate and rhythm with normal S1/S2 without murmurs, rubs or gallops. Abdomen: Soft, non-tender, non-distended with normal bowel sounds. Musculoskeletal: No clubbing, cyanosis or edema bilaterally. Full range of motion without deformity. Skin: Skin color, texture, turgor normal.  No rashes or lesions. Neurologic:  Neurovascularly intact without any focal sensory/motor deficits. Cranial nerves: II-XII intact, grossly non-focal.  Psychiatric: Alert and oriented, thought content appropriate, normal insight  Capillary Refill: Brisk,< 3 seconds   Peripheral Pulses: +2 palpable, equal bilaterally       Labs:   Recent Labs     07/31/22 0528 08/01/22  0529 08/02/22  0545   WBC 7.6 6.7 7.1   HGB 8.5* 7.9* 7.7*   HCT 25.3* 23.3* 22.5*   * 591* 564*       Recent Labs     07/31/22 0528 08/01/22  0529 08/02/22  0545   * 136 137   K 4.2 4.1 4.5   CL 97* 100 100   CO2 32 31 31   BUN 9 8 9   CREATININE <0.5* <0.5* 0.6   CALCIUM 8.3 7.8* 8.2*       No results for input(s): AST, ALT, BILIDIR, BILITOT, ALKPHOS in the last 72 hours. No results for input(s): INR in the last 72 hours. No results for input(s): [facility] in the last 72 hours. Urinalysis:      Lab Results   Component Value Date/Time    NITRU Negative 07/22/2022 06:31 AM    WBCUA 2 07/22/2022 06:31 AM    BACTERIA None Seen 07/22/2022 06:31 AM    RBCUA 5 07/22/2022 06:31 AM    BLOODU Negative 07/22/2022 06:31 AM    SPECGRAV 1.049 07/22/2022 06:31 AM    GLUCOSEU Negative 07/22/2022 06:31 AM       Radiology:  XR CHEST PORTABLE   Final Result   Stable multifocal airspace disease in the right lung with probable upper lobe   abscess.          XR CHEST PORTABLE   Final Result   Stable multifocal airspace disease in the right lung, including probable   pulmonary abscess in the right upper lobe. CT CHEST WO CONTRAST   Final Result   1. Mixed consolidative and ground-glass opacities as well as interstitial   thickening throughout the majority of the right lung compatible with   pneumonia. 2. There is a cavity in the right upper lobe demonstrating an air-fluid level   measuring up to 9.7 cm concerning for abscess formation. 3. Trace right pleural effusion. 4. Prominent and enlarged mediastinal lymph nodes, likely reactive. 5. Stable 6 mm left lower lobe pulmonary nodule. RECOMMENDATIONS:   Fleischner Society guidelines for follow-up and management of incidentally   detected pulmonary nodules:      Single Solid Nodule:      Nodule size less than 6 mm   In a low-risk patient, no routine follow-up. In a high-risk patient, optional CT at 12 months. Nodule size equals 6-8 mm   In a low-risk patient, CT at 6-12 months, then consider CT at 18-24 months. In a high-risk patient, CT at 6-12 months, then CT at 18-24 months. Nodule size greater than 8 mm         In a low-risk patient, consider CT at 3 months, PET/CT, or tissue sampling. In a high-risk patient, consider CT at 3 months, PET/CT, or tissue sampling. Multiple Solid Nodules:      Nodule size less than 6 mm   In a low-risk patient, no routine follow-up. In a high-risk patient, optional CT at 12 months. Nodule size equals 6-8 mm   In a low-risk patient, CT at 3-6 months, then consider CT at 18-24 months. In a high-risk patient, CT at 3-6 months, then CT at 18-24 months. Nodule size greater than 8 mm   In a low-risk patient, CT at 3-6 months, then consider CT at 18-24 months. In a high-risk patient, CT at 3-6 months, then CT at 18-24 months. - Low risk patients include individuals with minimal or absent history of   smoking and other known risk factors.       - High risk patients include individuals with a history or smoking or known risk factors. Radiology 2017 http://pubs. rsna.org/doi/full/10.1148/radiol. 0685425502         XR CHEST PORTABLE   Final Result   1. Increased pneumonia throughout the right lung remaining most prominent in   the right upper lobe. 2. Emphysema better demonstrated on CT. 3. Possible trace right pleural effusion. XR CHEST PORTABLE   Final Result   Improved aeration of the right chest with persistent consolidation in the mid   to upper right chest.         CT CHEST WO CONTRAST    (Results Pending)           Assessment/Plan:    Active Hospital Problems    Diagnosis Date Noted    Peripherally inserted central catheter (PICC) in place [Z45.2] 07/31/2022     Priority: Medium    NSTEMI (non-ST elevated myocardial infarction) ([address] [I21.4] 07/26/2022     Priority: Medium    Necrotizing pneumonia ([address] [J85.0] 07/26/2022     Priority: Medium    Abscess of upper lobe of right lung with pneumonia ([address] [J85.1] 07/26/2022     Priority: Medium    Pseudomonas respiratory infection [J98.8, B96.5] 07/26/2022     Priority: Medium    Abnormal CT of the chest [R93.89] 07/26/2022     Priority: Medium    Acute on chronic respiratory failure with hypoxia and hypercapnia (HCC) [J96.21, J96.22] 07/18/2022     Priority: Medium    Abnormal EKG [R94.31] 07/18/2022     Priority: Medium    Chronic obstructive pulmonary disease ([address] [J44.9]      Priority: Medium    Pneumonia of right lung due to infectious organism [J18.9] 07/03/2022     Priority: Medium    COPD, severe ([address] [J44.9] 02/24/2017    Chronic pain syndrome [G89.4] 03/14/2016    Hypercholesteremia [E78.00] 11/04/2013    Fibromyalgia [M79.7]     Smoking [F17.200] 08/27/2011     Acute on chronic respiratory failure with hypoxia and hypercapnia (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  Will require proloniged abx for abscess and close follow up visits from NH to doctors offices  - CT surgery consulted:  would need pneumonectomy for which she is a poor candidate --> medical therapy only  - Respiratory sputum culture repeated on 7/30 without pseudomonnas     Critical coronary artery disease single-vessel  Status post PCI to left circumflex by Dr Alexandra Marroquin on this admission  with stent  Now on antiplatelet therapy     Anemia  No evidence of bleeding  Suspect anemia of chronic disease  Will check iron studies TIBC ferritin % and reticulocyte count  No indication to transfuse at this point  ? Benefit to EPO     COPD, severe ([address] - without acute exacerbation. Will provide Nebulizer treatments as needed and continue home medications. Patient will be monitored closely, and deep breathing and coughing will be encouraged while awake. Hyperlipidemia - No current evidence of Rhabdomyolysis or other adverse effects. Continue statin therapy while in the hospital     Chronic pain syndrome -continue her home pain med regimen     Fibromyalgia - provide supportive care    DVT Prophylaxis: heparin  Diet: ADULT DIET; Regular;  Low Sodium (2 gm)  ADULT ORAL NUTRITION SUPPLEMENT; Breakfast, Lunch, Dinner; Standard High Calorie/High Protein Oral Supplement  Code Status: Full Code    PT/OT Eval Status: evaluating    Dispo -PCU, medically ready for D/C    Fanny Cruz MD

## 2022-08-03 NOTE — PLAN OF CARE
Problem: Chronic Conditions and Co-morbidities  Goal: Patient's chronic conditions and co-morbidity symptoms are monitored and maintained or improved  8/3/2022 1351 by Rachana Leyva RN  Outcome: Progressing  8/3/2022 0049 by Moises Perez RN  Outcome: Not Progressing

## 2022-08-03 NOTE — PROCEDURES
BRONCHOSCOPY WHILE IN THE INTENSIVE CARE UNIT    Indications:  Hemoptysis   Consent:  EMERGENTLY PERFORMED. TIME OUT NOT PERFORMED  Pre-op diagnosis:  Pneumonia     Type of sedation used: Moderate Sedation  Sedation:  Continuous Fentanyl and propfol infusions. 40 mg of etomidate. 4 mg of versed, 50 mg of rocuronium   Anesthetic:  None    Physician/patient face-to-face sedation start time: 1240   Physician/patient face-to-face sedation stop time: 1310  Total moderate sedation time in minutes: 30 minutes    Patient was monitored continuously 1:1 throughout the entire procedure while sedation was administered        Mallampati Class:  IV      ASA Class 4 - A patient with severe systemic disease that is a constant threat to life    Narrative: The patient was located in MICU bed 2105. The patient was intubated. The patient was hooked to continuous monitoring. I entered the ETT without difficulty. The right upper lobe demonstrated normal anatomy. The middle lobe demonstrated normal anatomy. The right lower lobe demonstrated normal anatomy. The left upper lobe demonstrated normal anatomy. The left lower lobe demonstrated normal anatomy. There was no evidence of ongoing bleeding from the airways. Saline instilled in the RUL, RLL and Left lung. No bloody return noted. Suspect bleeding from nose. Minimal aspirated blood in airways as well    I then removed my scope to check for bleeding. There was no evidence of bleeding. Estimated blood loss:  None  Specimens:  None  Post-op diagnosis:  Epistaxis  Complications: None    DO Quique Booth Pulmonology, Sleep and Critical Care.

## 2022-08-03 NOTE — PROGRESS NOTES
Hospitalist Progress Note      PCP: HECTOR Srivastava - FRED    Date of Admission: 7/18/2022    Chief Complaint: respiratory distress    Hospital Course:      Subjective:   Hemoptysis- significant  No F/C  Some SOB  On 5L/min oxygen        Medications:  Reviewed    Infusion Medications    sodium chloride 5 mL/hr at 07/25/22 0502    dextrose       Scheduled Medications    enoxaparin  40 mg SubCUTAneous Daily    meropenem  1,000 mg IntraVENous Q8H    tobramycin (PF)  300 mg Nebulization BID    sodium chloride  500 mL IntraVENous Once    aspirin  81 mg Oral Daily    nystatin  5 mL Oral 4x Daily    prasugrel  10 mg Oral Daily    sodium chloride flush  5-40 mL IntraVENous 2 times per day    atorvastatin  40 mg Oral Nightly    tiZANidine  4 mg Oral Nightly    pantoprazole  40 mg Oral Daily    gabapentin  600 mg Oral BID    DULoxetine  60 mg Oral BID    insulin lispro  0-12 Units SubCUTAneous TID WC    insulin lispro  0-6 Units SubCUTAneous Nightly    mometasone-formoterol  2 puff Inhalation BID    And    tiotropium  2 puff Inhalation Daily     PRN Meds: sodium chloride, HYDROcodone 5 mg - acetaminophen, sodium chloride flush, sodium chloride, acetaminophen, [Held by provider] oxyCODONE, [Held by provider] HYDROcodone 5 mg - acetaminophen, perflutren lipid microspheres, traZODone, ipratropium-albuterol, cetirizine, albuterol sulfate HFA, glucose, dextrose bolus **OR** dextrose bolus, glucagon (rDNA), dextrose, ondansetron, polyethylene glycol      Intake/Output Summary (Last 24 hours) at 8/3/2022 1044  Last data filed at 8/3/2022 0437  Gross per 24 hour   Intake 200 ml   Output 1750 ml   Net -1550 ml         Exam:    /77   Pulse 95   Temp 97.7 °F (36.5 °C) (Oral)   Resp 19   Ht 5' 4\" (1.626 m)   Wt 130 lb 11.7 oz (59.3 kg)   SpO2 93%   BMI 22.44 kg/m²     General appearance: Anxious+, appears stated age and cooperative. HEENT: Pupils equal, round, and reactive to light.  Conjunctivae/corneas clear.  Neck: Supple, with full range of motion. No jugular venous distention. Trachea midline. Respiratory:  Comfortably on Bipap. Crackles bibasally. Cardiovascular: Regular rate and rhythm with normal S1/S2 without murmurs, rubs or gallops. Abdomen: Soft, non-tender, non-distended with normal bowel sounds. Musculoskeletal: No clubbing, cyanosis or edema bilaterally. Full range of motion without deformity. Skin: Skin color, texture, turgor normal.  No rashes or lesions. Neurologic:  Neurovascularly intact without any focal sensory/motor deficits. Cranial nerves: II-XII intact, grossly non-focal.  Psychiatric: Alert and oriented, thought content appropriate, normal insight  Capillary Refill: Brisk,< 3 seconds   Peripheral Pulses: +2 palpable, equal bilaterally       Labs:   Recent Labs     08/01/22 0529 08/02/22  0545 08/03/22  0540   WBC 6.7 7.1 6.6   HGB 7.9* 7.7* 7.7*   HCT 23.3* 22.5* 22.9*   * 564* 559*       Recent Labs     08/01/22  0529 08/02/22  0545 08/03/22  0540    137 136   K 4.1 4.5 4.1    100 100   CO2 31 31 29   BUN 8 9 10   CREATININE <0.5* 0.6 <0.5*   CALCIUM 7.8* 8.2* 8.3       No results for input(s): AST, ALT, BILIDIR, BILITOT, ALKPHOS in the last 72 hours. No results for input(s): INR in the last 72 hours. No results for input(s): [place] in the last 72 hours. Urinalysis:      Lab Results   Component Value Date/Time    NITRU Negative 07/22/2022 06:31 AM    WBCUA 2 07/22/2022 06:31 AM    BACTERIA None Seen 07/22/2022 06:31 AM    RBCUA 5 07/22/2022 06:31 AM    BLOODU Negative 07/22/2022 06:31 AM    SPECGRAV 1.049 07/22/2022 06:31 AM    GLUCOSEU Negative 07/22/2022 06:31 AM       Radiology:  XR CHEST PORTABLE   Final Result   Unchanged multifocal right-sided pneumonia. XR CHEST PORTABLE   Final Result   Stable multifocal airspace disease in the right lung with probable upper lobe   abscess.          XR CHEST PORTABLE   Final Result   Stable multifocal airspace disease in the right lung, including probable   pulmonary abscess in the right upper lobe. CT CHEST WO CONTRAST   Final Result   1. Mixed consolidative and ground-glass opacities as well as interstitial   thickening throughout the majority of the right lung compatible with   pneumonia. 2. There is a cavity in the right upper lobe demonstrating an air-fluid level   measuring up to 9.7 cm concerning for abscess formation. 3. Trace right pleural effusion. 4. Prominent and enlarged mediastinal lymph nodes, likely reactive. 5. Stable 6 mm left lower lobe pulmonary nodule. RECOMMENDATIONS:   Fleischner Society guidelines for follow-up and management of incidentally   detected pulmonary nodules:      Single Solid Nodule:      Nodule size less than 6 mm   In a low-risk patient, no routine follow-up. In a high-risk patient, optional CT at 12 months. Nodule size equals 6-8 mm   In a low-risk patient, CT at 6-12 months, then consider CT at 18-24 months. In a high-risk patient, CT at 6-12 months, then CT at 18-24 months. Nodule size greater than 8 mm         In a low-risk patient, consider CT at 3 months, PET/CT, or tissue sampling. In a high-risk patient, consider CT at 3 months, PET/CT, or tissue sampling. Multiple Solid Nodules:      Nodule size less than 6 mm   In a low-risk patient, no routine follow-up. In a high-risk patient, optional CT at 12 months. Nodule size equals 6-8 mm   In a low-risk patient, CT at 3-6 months, then consider CT at 18-24 months. In a high-risk patient, CT at 3-6 months, then CT at 18-24 months. Nodule size greater than 8 mm   In a low-risk patient, CT at 3-6 months, then consider CT at 18-24 months. In a high-risk patient, CT at 3-6 months, then CT at 18-24 months. - Low risk patients include individuals with minimal or absent history of   smoking and other known risk factors.       - High risk patients include individuals with a history or smoking or known   risk factors. Radiology 2017 http://pubs. rsna.org/doi/full/10.1148/radiol. 2317571990         XR CHEST PORTABLE   Final Result   1. Increased pneumonia throughout the right lung remaining most prominent in   the right upper lobe. 2. Emphysema better demonstrated on CT. 3. Possible trace right pleural effusion. XR CHEST PORTABLE   Final Result   Improved aeration of the right chest with persistent consolidation in the mid   to upper right chest.         CT CHEST WO CONTRAST    (Results Pending)           Assessment/Plan:    Active Hospital Problems    Diagnosis Date Noted    Peripherally inserted central catheter (PICC) in place [Z45.2] 07/31/2022     Priority: Medium    NSTEMI (non-ST elevated myocardial infarction) ([address] [I21.4] 07/26/2022     Priority: Medium    Necrotizing pneumonia ([address] [J85.0] 07/26/2022     Priority: Medium    Abscess of upper lobe of right lung with pneumonia ([address] [J85.1] 07/26/2022     Priority: Medium    Pseudomonas respiratory infection [J98.8, B96.5] 07/26/2022     Priority: Medium    Abnormal CT of the chest [R93.89] 07/26/2022     Priority: Medium    Acute on chronic respiratory failure with hypoxia and hypercapnia (HCC) [J96.21, J96.22] 07/18/2022     Priority: Medium    Abnormal EKG [R94.31] 07/18/2022     Priority: Medium    Chronic obstructive pulmonary disease ([address] [J44.9]      Priority: Medium    Pneumonia of right lung due to infectious organism [J18.9] 07/03/2022     Priority: Medium    COPD, severe ([address] [J44.9] 02/24/2017    Chronic pain syndrome [G89.4] 03/14/2016    Hypercholesteremia [E78.00] 11/04/2013    Fibromyalgia [M79.7]     Smoking [F17.200] 08/27/2011     Acute on chronic hypoxic respiratory failure     2. 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 in situ   -CT surgery consulted:  would need pneumonectomy for which she is a poor candidate --> medical therapy only  - Respiratory sputum culture repeated on 7/30 without pseudomonnas  -  3. Hemoptysis sec to 2.  -DC lovenox.  -Keep ASA and prasugrel going  -consulted Pulm again, discussed and will transfer to ICU.  -HnH q8  -Transfuse PRBCs if hb is below 7.0  -Transfer to ICU        4. Critical coronary artery disease single-vessel  Status post PCI to left circumflex by Dr Art Jensen on this admission  with stent  Now on antiplatelet therapy     5. Anemia, AOCD. Now has hemoptysis. FU iron studies TIBC ferritin % and reticulocyte count  No indication to transfusion yet       COPD, severe ([address] - without acute exacerbation. Will provide Nebulizer treatments as needed and continue home medications. Patient will be monitored closely, and deep breathing and coughing will be encouraged while awake. Hyperlipidemia - No current evidence of Rhabdomyolysis or other adverse effects. Continue statin therapy while in the hospital     Chronic pain syndrome -continue her home pain med regimen     Fibromyalgia - provide supportive care    DVT Prophylaxis: heparin  Diet: ADULT DIET; Regular;  Low Sodium (2 gm)  ADULT ORAL NUTRITION SUPPLEMENT; Breakfast, Lunch, Dinner; Standard High Calorie/High Protein Oral Supplement  Code Status: Full Code    PT/OT Eval Status: evaluating    Dispo -PCU, medically ready for D/C    Kell Boyer MD

## 2022-08-03 NOTE — CONSULTS
Akhil      Patient Name: [facility] Record Number:  [id number]  Primary Care Physician:  Jayesh Palomino, APRN - CNP  Date of Consultation: 8/3/2022    Chief Complaint: epistaxis/hemoptysis         HISTORY OF PRESENT ILLNESS  Jadiel Welch is a(n) 78 y.o. female who has been hospitalized for about a month with a pneumonia. She was reportedly getting better until today when she spontaneously started spitting up blood. Given the pneumonia the initial concern was a pulmonary source. She was intubated and bronched by Sharia Cranker with pulmonology. He did not see an obvious source from the lungs. She was also bleeding from the nose it was felt as though it was likely from that. She is currently intubated and sedated in the ICU. REVIEW OF SYSTEMS  Intubated and sedated in the ICU    PHYSICAL EXAM  GENERAL: Intubated and sedated  EYES: EOMI, Anti-icteric  HENT:   Head: Normocephalic and atraumatic. Face:  Symmetric, facial nerve intact, no sinus tenderness  Right Ear: Normal external ear, normal external auditory canal,  Left Ear: Normal external ear, normal external auditory canal  Mouth/Oral Cavity:  normal lips, Uvula is midline, no mucosal lesions, no trismus  Oropharynx/Larynx:  normal oropharynx, blood in the oropharynx  Nose: Copious bleeding from the right nasal cavity  NECK: Normal range of motion, no thyromegaly, trachea is midline, no lymphadenopathy, no neck masses, no crepitus        PROCEDURE  Control of epistaxis  I evacuated blood from the right nasal cavity. She had a fairly severe rightward septal deviation of the mid septum. She had some bleeding of the septum in this area. It is difficult to visualize posterior to the deviation. Given the severity the decision was made to place a [facility]. I placed a Rhino Rocket very inferiorly in the right nasal cavity.   The patient still had bleeding and on exam

## 2022-08-03 NOTE — CONSULTS
PATIENT IS SEEN AT THE REQUEST OF DR. Mil Martinez for hemoptysis    CONSULTING PHYSICIAN: Torsten    HISTORY OF PRESENT ILLNESS:  This is a 78 y.o. female who has had a prolonged stay in the hospital.  She was nearing discharged when she started to cough up blood. This started suddenly. She was seen on the floor and massive amounts of blood and clots were noted. Bleeding was also noted from the nares. She agreed to intubation and assessment. Has been battling a necrotic pneumonia. Established Pulmonologist:  Greg     PAST MEDICAL HISTORY:  Past Medical History:   Diagnosis Date    CAD (coronary artery disease) 07/19/2022    NSTEMI, PCI LCx    Chronic pain syndrome     Percocet. Dr. Gertrude Mendoza    Colorectal polyps     COPD (chronic obstructive pulmonary disease) ([address]     CTS (carpal tunnel syndrome)     BILATERAL    DDD (degenerative disc disease), lumbar     Depression     Family hx of colon cancer     Fibromyalgia     Hyperlipemia     IBS (irritable bowel syndrome)     Lumbar spondylosis     New onset type 2 diabetes mellitus ([address] 02/03/2020    On home O2     4L    Pneumonia 07/2022    P.aer    Urticaria, chronic        PAST SURGICAL HISTORY:  Past Surgical History:   Procedure Laterality Date    CARPAL TUNNEL RELEASE Bilateral     CATARACT EXTRACTION      CERVICAL POLYP REMOVAL  09/2004    CORONARY ANGIOPLASTY WITH STENT PLACEMENT  07/19/2022    LCx 99. PCI/stent.     INTRACAPSULAR CATARACT EXTRACTION Left 06/23/2020    Phacoemulsification with intraocular lens implant performed by Corinne Harm, MD at [address] Right 07/14/2020    Phacoemulsification with intraocular lens implant performed by Corinne Harm, MD at [address]      patient denies        FAMILY HISTORY:  family history includes Alzheimer's Disease in her mother; Cancer in her father; Diabetes in her daughter, daughter, and daughter; Heart syncope  Hematological: Negative for adenopathy  Extremities:  Negative for swelling    PHYSICAL EXAM:  Blood pressure 113/78, pulse (!) 128, temperature 98.5 °F (36.9 °C), temperature source Oral, resp. rate 24, height 5' 4\" (1.626 m), weight 130 lb 11.7 oz (59.3 kg), SpO2 99 %, not currently breastfeeding.'  Gen: No distress. Chronically ill appearing   Eyes: PERRL. No sclera icterus. No conjunctival injection. ENT: No discharge. Pharynx with blood from right nare,  Clots in pharynx  Neck: Trachea midline. No obvious mass. Resp: No accessory muscle use. No crackles. No wheezes. No rhonchi. CV: Regular rate. Regular rhythm. No murmur or rub. GI: Non-tender. Non-distended. No hernia. BS present. Skin: Warm and dry. No nodule on exposed extremities. Lymph: No cervical LAD. No supraclavicular LAD. M/S: No cyanosis. No joint deformity. Neuro: Awake. Alert. Moves all four extremities. EXT:   no edema, no clubbing    LABS:  CBC:   Recent Labs     08/01/22  0529 08/02/22  0545 08/03/22  0540   WBC 6.7 7.1 6.6   HGB 7.9* 7.7* 7.7*   HCT 23.3* 22.5* 22.9*   MCV 90.8 90.3 90.7   * 564* 559*     BMP:   Recent Labs     08/01/22  0529 08/02/22  0545 08/03/22  0540    137 136   K 4.1 4.5 4.1    100 100   CO2 31 31 29   BUN 8 9 10   CREATININE <0.5* 0.6 <0.5*     LIVER PROFILE: No results for input(s): AST, ALT, LIPASE, BILIDIR, BILITOT, ALKPHOS in the last 72 hours. Invalid input(s): AMYLASE,  ALB  PT/INR: No results for input(s): PROTIME, INR in the last 72 hours. APTT: No results for input(s): APTT in the last 72 hours. UA:No results for input(s): NITRITE, COLORU, PHUR, LABCAST, WBCUA, RBCUA, MUCUS, TRICHOMONAS, YEAST, BACTERIA, CLARITYU, SPECGRAV, LEUKOCYTESUR, UROBILINOGEN, BILIRUBINUR, BLOODU, GLUCOSEU, AMORPHOUS in the last 72 hours. Invalid input(s): KETONESU  No results for input(s): PHART, WDC2WVM, PO2ART in the last 72 hours.     Cultures:  Sputum:  pseudomonas ABG:  None    Chest X-ray:  Chest imaging was reviewed by me and showed right sided airspace disease    I reviewed all the above labs and studies pertaining to this visit. ASSESSMENT/PLAN:  Massive Epistaxis (initially thought to be hemoptysis)  Emergent intubation was completed due to concerns for hemoptysis but bronchoscopy performed showed no active bleeding in the lungs  Full vent support  Saturate gauze with afrin and pack nose. ENT consult  Transfuse one unit of blood  Hold all blood thinners  ABG in 1 hour  Necrotizing pneumonia due to pseudomonas   Merrem and inhaled Arnie  Severe COPD   Dulera q 12 hours  DC spiriva while intubated     DVT prophylaxis  SCD  GI prophylaxis  Pepcid     Daughters updated    Critical care time of 35 minutes. This does not include procedural time. Please see procedural notes for details.     DO COBY Moreno [place] [facility]

## 2022-08-04 PROBLEM — D62 ACUTE BLOOD LOSS ANEMIA: Status: ACTIVE | Noted: 2022-08-04

## 2022-08-04 PROBLEM — R06.89 ACUTE RESPIRATORY INSUFFICIENCY: Status: ACTIVE | Noted: 2022-08-04

## 2022-08-04 PROBLEM — I95.2 HYPOTENSION DUE TO DRUGS: Status: ACTIVE | Noted: 2022-08-04

## 2022-08-04 LAB
ABO/RH: NORMAL
ANION GAP SERPL CALCULATED.3IONS-SCNC: 8 MMOL/L (ref 3–16)
ANISOCYTOSIS: ABNORMAL
ANTIBODY SCREEN: NORMAL
ATYPICAL LYMPHOCYTE RELATIVE PERCENT: 2 % (ref 0–6)
BANDED NEUTROPHILS RELATIVE PERCENT: 4 % (ref 0–7)
BASE EXCESS ARTERIAL: 2.3 MMOL/L (ref -3–3)
BASE EXCESS ARTERIAL: 4.5 MMOL/L (ref -3–3)
BASOPHILS ABSOLUTE: 0.1 K/UL (ref 0–0.2)
BASOPHILS RELATIVE PERCENT: 1 %
BLOOD BANK DISPENSE STATUS: NORMAL
BLOOD BANK PRODUCT CODE: NORMAL
BPU ID: NORMAL
BUN BLDV-MCNC: 12 MG/DL (ref 7–20)
CALCIUM SERPL-MCNC: 7.6 MG/DL (ref 8.3–10.6)
CARBOXYHEMOGLOBIN ARTERIAL: 1.1 % (ref 0–1.5)
CARBOXYHEMOGLOBIN ARTERIAL: 1.5 % (ref 0–1.5)
CHLORIDE BLD-SCNC: 101 MMOL/L (ref 99–110)
CO2: 25 MMOL/L (ref 21–32)
CREAT SERPL-MCNC: <0.5 MG/DL (ref 0.6–1.2)
DESCRIPTION BLOOD BANK: NORMAL
EOSINOPHILS ABSOLUTE: 0.2 K/UL (ref 0–0.6)
EOSINOPHILS RELATIVE PERCENT: 2 %
GFR AFRICAN AMERICAN: >60
GFR NON-AFRICAN AMERICAN: >60
GLUCOSE BLD-MCNC: 107 MG/DL (ref 70–99)
GLUCOSE BLD-MCNC: 114 MG/DL (ref 70–99)
GLUCOSE BLD-MCNC: 116 MG/DL (ref 70–99)
GLUCOSE BLD-MCNC: 117 MG/DL (ref 70–99)
GLUCOSE BLD-MCNC: 98 MG/DL (ref 70–99)
HCO3 ARTERIAL: 27.6 MMOL/L (ref 21–29)
HCO3 ARTERIAL: 30.6 MMOL/L (ref 21–29)
HCT VFR BLD CALC: 19.2 % (ref 36–48)
HCT VFR BLD CALC: 23.5 % (ref 36–48)
HEMOGLOBIN, ART, EXTENDED: 8 G/DL (ref 12–16)
HEMOGLOBIN, ART, EXTENDED: 8.6 G/DL (ref 12–16)
HEMOGLOBIN: 6.4 G/DL (ref 12–16)
HEMOGLOBIN: 7.9 G/DL (ref 12–16)
LYMPHOCYTES ABSOLUTE: 1.4 K/UL (ref 1–5.1)
LYMPHOCYTES RELATIVE PERCENT: 13 %
MAGNESIUM: 1.6 MG/DL (ref 1.8–2.4)
MCH RBC QN AUTO: 30.5 PG (ref 26–34)
MCHC RBC AUTO-ENTMCNC: 33.5 G/DL (ref 31–36)
MCV RBC AUTO: 91 FL (ref 80–100)
METAMYELOCYTES RELATIVE PERCENT: 3 %
METHEMOGLOBIN ARTERIAL: 0.5 %
METHEMOGLOBIN ARTERIAL: 0.7 %
MONOCYTES ABSOLUTE: 0.9 K/UL (ref 0–1.3)
MONOCYTES RELATIVE PERCENT: 10 %
NEUTROPHILS ABSOLUTE: 6.8 K/UL (ref 1.7–7.7)
NEUTROPHILS RELATIVE PERCENT: 65 %
O2 SAT, ARTERIAL: 92.2 %
O2 SAT, ARTERIAL: >100 %
O2 THERAPY: ABNORMAL
O2 THERAPY: ABNORMAL
PCO2 ARTERIAL: 46 MMHG (ref 35–45)
PCO2 ARTERIAL: 53.3 MMHG (ref 35–45)
PDW BLD-RTO: 15.1 % (ref 12.4–15.4)
PERFORMED ON: ABNORMAL
PERFORMED ON: NORMAL
PH ARTERIAL: 7.37 (ref 7.35–7.45)
PH ARTERIAL: 7.39 (ref 7.35–7.45)
PHOSPHORUS: 3.2 MG/DL (ref 2.5–4.9)
PLATELET # BLD: 526 K/UL (ref 135–450)
PLATELET SLIDE REVIEW: ABNORMAL
PMV BLD AUTO: 6.9 FL (ref 5–10.5)
PO2 ARTERIAL: 221 MMHG (ref 75–108)
PO2 ARTERIAL: 66.8 MMHG (ref 75–108)
POTASSIUM REFLEX MAGNESIUM: 4.3 MMOL/L (ref 3.5–5.1)
PRO-BNP: 3081 PG/ML (ref 0–449)
RBC # BLD: 2.58 M/UL (ref 4–5.2)
SLIDE REVIEW: ABNORMAL
SODIUM BLD-SCNC: 134 MMOL/L (ref 136–145)
TCO2 ARTERIAL: 29 MMOL/L
TCO2 ARTERIAL: 32.2 MMOL/L
WBC # BLD: 9.4 K/UL (ref 4–11)

## 2022-08-04 PROCEDURE — 86901 BLOOD TYPING SEROLOGIC RH(D): CPT

## 2022-08-04 PROCEDURE — 94003 VENT MGMT INPAT SUBQ DAY: CPT

## 2022-08-04 PROCEDURE — 6360000002 HC RX W HCPCS: Performed by: NURSE PRACTITIONER

## 2022-08-04 PROCEDURE — 85014 HEMATOCRIT: CPT

## 2022-08-04 PROCEDURE — 2580000003 HC RX 258: Performed by: INTERNAL MEDICINE

## 2022-08-04 PROCEDURE — 99291 CRITICAL CARE FIRST HOUR: CPT | Performed by: INTERNAL MEDICINE

## 2022-08-04 PROCEDURE — 85018 HEMOGLOBIN: CPT

## 2022-08-04 PROCEDURE — P9016 RBC LEUKOCYTES REDUCED: HCPCS

## 2022-08-04 PROCEDURE — 0BH18EZ INSERTION OF ENDOTRACHEAL AIRWAY INTO TRACHEA, VIA NATURAL OR ARTIFICIAL OPENING ENDOSCOPIC: ICD-10-PCS | Performed by: INTERNAL MEDICINE

## 2022-08-04 PROCEDURE — 86900 BLOOD TYPING SEROLOGIC ABO: CPT

## 2022-08-04 PROCEDURE — 6370000000 HC RX 637 (ALT 250 FOR IP): Performed by: INTERNAL MEDICINE

## 2022-08-04 PROCEDURE — 36592 COLLECT BLOOD FROM PICC: CPT

## 2022-08-04 PROCEDURE — 36600 WITHDRAWAL OF ARTERIAL BLOOD: CPT

## 2022-08-04 PROCEDURE — 2000000000 HC ICU R&B

## 2022-08-04 PROCEDURE — 99231 SBSQ HOSP IP/OBS SF/LOW 25: CPT | Performed by: OTOLARYNGOLOGY

## 2022-08-04 PROCEDURE — 99233 SBSQ HOSP IP/OBS HIGH 50: CPT | Performed by: INTERNAL MEDICINE

## 2022-08-04 PROCEDURE — 5A1935Z RESPIRATORY VENTILATION, LESS THAN 24 CONSECUTIVE HOURS: ICD-10-PCS | Performed by: INTERNAL MEDICINE

## 2022-08-04 PROCEDURE — 80048 BASIC METABOLIC PNL TOTAL CA: CPT

## 2022-08-04 PROCEDURE — 86923 COMPATIBILITY TEST ELECTRIC: CPT

## 2022-08-04 PROCEDURE — 2500000003 HC RX 250 WO HCPCS: Performed by: INTERNAL MEDICINE

## 2022-08-04 PROCEDURE — 2500000003 HC RX 250 WO HCPCS: Performed by: NURSE PRACTITIONER

## 2022-08-04 PROCEDURE — 36430 TRANSFUSION BLD/BLD COMPNT: CPT

## 2022-08-04 PROCEDURE — 85025 COMPLETE CBC W/AUTO DIFF WBC: CPT

## 2022-08-04 PROCEDURE — 82803 BLOOD GASES ANY COMBINATION: CPT

## 2022-08-04 PROCEDURE — 83735 ASSAY OF MAGNESIUM: CPT

## 2022-08-04 PROCEDURE — 36415 COLL VENOUS BLD VENIPUNCTURE: CPT

## 2022-08-04 PROCEDURE — 6360000002 HC RX W HCPCS: Performed by: INTERNAL MEDICINE

## 2022-08-04 PROCEDURE — 94761 N-INVAS EAR/PLS OXIMETRY MLT: CPT

## 2022-08-04 PROCEDURE — 83880 ASSAY OF NATRIURETIC PEPTIDE: CPT

## 2022-08-04 PROCEDURE — 84100 ASSAY OF PHOSPHORUS: CPT

## 2022-08-04 PROCEDURE — 6370000000 HC RX 637 (ALT 250 FOR IP): Performed by: NURSE PRACTITIONER

## 2022-08-04 PROCEDURE — 86850 RBC ANTIBODY SCREEN: CPT

## 2022-08-04 PROCEDURE — 2700000000 HC OXYGEN THERAPY PER DAY

## 2022-08-04 PROCEDURE — 94640 AIRWAY INHALATION TREATMENT: CPT

## 2022-08-04 PROCEDURE — APPNB15 APP NON BILLABLE TIME 0-15 MINS: Performed by: NURSE PRACTITIONER

## 2022-08-04 RX ORDER — FUROSEMIDE 10 MG/ML
20 INJECTION INTRAMUSCULAR; INTRAVENOUS ONCE
Status: COMPLETED | OUTPATIENT
Start: 2022-08-04 | End: 2022-08-04

## 2022-08-04 RX ORDER — MAGNESIUM SULFATE IN WATER 40 MG/ML
4000 INJECTION, SOLUTION INTRAVENOUS ONCE
Status: COMPLETED | OUTPATIENT
Start: 2022-08-04 | End: 2022-08-04

## 2022-08-04 RX ORDER — CALCIUM GLUCONATE 20 MG/ML
1000 INJECTION, SOLUTION INTRAVENOUS ONCE
Status: COMPLETED | OUTPATIENT
Start: 2022-08-04 | End: 2022-08-04

## 2022-08-04 RX ORDER — 0.9 % SODIUM CHLORIDE 0.9 %
1000 INTRAVENOUS SOLUTION INTRAVENOUS ONCE
Status: COMPLETED | OUTPATIENT
Start: 2022-08-04 | End: 2022-08-04

## 2022-08-04 RX ORDER — SODIUM CHLORIDE 9 MG/ML
INJECTION, SOLUTION INTRAVENOUS PRN
Status: DISCONTINUED | OUTPATIENT
Start: 2022-08-04 | End: 2022-08-04

## 2022-08-04 RX ADMIN — DULOXETINE HYDROCHLORIDE 60 MG: 60 CAPSULE, DELAYED RELEASE ORAL at 20:26

## 2022-08-04 RX ADMIN — NYSTATIN 500000 UNITS: 100000 SUSPENSION ORAL at 09:27

## 2022-08-04 RX ADMIN — FLUCONAZOLE 100 MG: 100 TABLET ORAL at 09:26

## 2022-08-04 RX ADMIN — SODIUM CHLORIDE 1000 ML: 9 INJECTION, SOLUTION INTRAVENOUS at 01:26

## 2022-08-04 RX ADMIN — CALCIUM GLUCONATE 1000 MG: 20 INJECTION, SOLUTION INTRAVENOUS at 09:36

## 2022-08-04 RX ADMIN — MAGNESIUM SULFATE HEPTAHYDRATE 4000 MG: 40 INJECTION, SOLUTION INTRAVENOUS at 10:45

## 2022-08-04 RX ADMIN — ATORVASTATIN CALCIUM 40 MG: 40 TABLET, FILM COATED ORAL at 20:26

## 2022-08-04 RX ADMIN — NYSTATIN 500000 UNITS: 100000 SUSPENSION ORAL at 13:50

## 2022-08-04 RX ADMIN — GABAPENTIN 600 MG: 300 CAPSULE ORAL at 09:26

## 2022-08-04 RX ADMIN — Medication 5 MCG/MIN: at 00:04

## 2022-08-04 RX ADMIN — SODIUM CHLORIDE, PRESERVATIVE FREE 10 ML: 5 INJECTION INTRAVENOUS at 09:39

## 2022-08-04 RX ADMIN — SODIUM CHLORIDE, PRESERVATIVE FREE 10 ML: 5 INJECTION INTRAVENOUS at 20:25

## 2022-08-04 RX ADMIN — MEROPENEM 1000 MG: 1 INJECTION, POWDER, FOR SOLUTION INTRAVENOUS at 06:28

## 2022-08-04 RX ADMIN — MOMETASONE FUROATE AND FORMOTEROL FUMARATE DIHYDRATE 2 PUFF: 200; 5 AEROSOL RESPIRATORY (INHALATION) at 20:42

## 2022-08-04 RX ADMIN — NYSTATIN 500000 UNITS: 100000 SUSPENSION ORAL at 20:26

## 2022-08-04 RX ADMIN — GABAPENTIN 600 MG: 300 CAPSULE ORAL at 20:26

## 2022-08-04 RX ADMIN — MOMETASONE FUROATE AND FORMOTEROL FUMARATE DIHYDRATE 2 PUFF: 200; 5 AEROSOL RESPIRATORY (INHALATION) at 08:30

## 2022-08-04 RX ADMIN — TOBRAMYCIN 300 MG: 300 SOLUTION RESPIRATORY (INHALATION) at 20:42

## 2022-08-04 RX ADMIN — MEROPENEM 1000 MG: 1 INJECTION, POWDER, FOR SOLUTION INTRAVENOUS at 21:59

## 2022-08-04 RX ADMIN — Medication 100 MCG/HR: at 01:08

## 2022-08-04 RX ADMIN — MEROPENEM 1000 MG: 1 INJECTION, POWDER, FOR SOLUTION INTRAVENOUS at 14:47

## 2022-08-04 RX ADMIN — TOBRAMYCIN 300 MG: 300 SOLUTION RESPIRATORY (INHALATION) at 08:29

## 2022-08-04 RX ADMIN — FUROSEMIDE 20 MG: 10 INJECTION, SOLUTION INTRAMUSCULAR; INTRAVENOUS at 11:53

## 2022-08-04 RX ADMIN — TIZANIDINE 4 MG: 4 TABLET ORAL at 20:26

## 2022-08-04 RX ADMIN — NYSTATIN 500000 UNITS: 100000 SUSPENSION ORAL at 17:18

## 2022-08-04 ASSESSMENT — PULMONARY FUNCTION TESTS
PIF_VALUE: 23
PIF_VALUE: 26
PIF_VALUE: 24
PIF_VALUE: 23
PIF_VALUE: 24
PIF_VALUE: 23
PIF_VALUE: 23
PIF_VALUE: 24
PIF_VALUE: 28
PIF_VALUE: 24
PIF_VALUE: 11
PIF_VALUE: 25
PIF_VALUE: 24
PIF_VALUE: 23
PIF_VALUE: 23
PIF_VALUE: 24
PIF_VALUE: 23
PIF_VALUE: 11
PIF_VALUE: 24
PIF_VALUE: 23
PIF_VALUE: 24
PIF_VALUE: 11
PIF_VALUE: 25
PIF_VALUE: 23
PIF_VALUE: 23
PIF_VALUE: 24
PIF_VALUE: 11
PIF_VALUE: 23
PIF_VALUE: 26
PIF_VALUE: 11
PIF_VALUE: 23
PIF_VALUE: 23
PIF_VALUE: 25
PIF_VALUE: 23
PIF_VALUE: 11
PIF_VALUE: 23
PIF_VALUE: 24
PIF_VALUE: 23

## 2022-08-04 ASSESSMENT — PAIN SCALES - GENERAL
PAINLEVEL_OUTOF10: 0
PAINLEVEL_OUTOF10: 0

## 2022-08-04 NOTE — PROGRESS NOTES
Pt on SBT. ABG results reviewed with Dr. Harper Rubio. Orders received to extubate pt. Pt extubated without complications. Non-rebreather mask applied at 15L. Pt oxygen saturation at 100%.

## 2022-08-04 NOTE — PROGRESS NOTES
rate and rhythm with normal S1/S2 without murmurs, rubs or gallops. Abdomen: Soft, non-tender, non-distended with normal bowel sounds. Musculoskeletal: No clubbing, cyanosis or edema bilaterally. Full range of motion without deformity. Skin: Skin color, texture, turgor normal.  No rashes or lesions. Neurologic:  Neurovascularly intact without any focal sensory/motor deficits. Cranial nerves: II-XII intact, grossly non-focal.  Psychiatric: Alert and oriented, thought content appropriate, normal insight  Capillary Refill: Brisk,< 3 seconds   Peripheral Pulses: +2 palpable, equal bilaterally       Labs:   Recent Labs     08/02/22 0545 08/03/22 0540 08/04/22  0014 08/04/22  0529   WBC 7.1 6.6  --  9.4   HGB 7.7* 7.7* 6.4* 7.9*   HCT 22.5* 22.9* 19.2* 23.5*   * 559*  --  526*       Recent Labs     08/02/22 0545 08/03/22 0540 08/04/22  0529    136 134*   K 4.5 4.1 4.3    100 101   CO2 31 29 25   BUN 9 10 12   CREATININE 0.6 <0.5* <0.5*   CALCIUM 8.2* 8.3 7.6*   PHOS  --   --  3.2       No results for input(s): AST, ALT, BILIDIR, BILITOT, ALKPHOS in the last 72 hours. No results for input(s): INR in the last 72 hours. No results for input(s): Katjoseph Diaz in the last 72 hours. Urinalysis:      Lab Results   Component Value Date/Time    NITRU Negative 07/22/2022 06:31 AM    WBCUA 2 07/22/2022 06:31 AM    BACTERIA None Seen 07/22/2022 06:31 AM    RBCUA 5 07/22/2022 06:31 AM    BLOODU Negative 07/22/2022 06:31 AM    SPECGRAV 1.049 07/22/2022 06:31 AM    GLUCOSEU Negative 07/22/2022 06:31 AM       Radiology:  XR CHEST PORTABLE   Final Result   Endotracheal tube with the tip approximately 1 cm above the song. Consider   retraction by approximately 3.5 cm. Enteric tube with the tip and the side   hole below the diaphragm overlying the left upper abdomen, likely within the   fundus of the stomach. Redemonstration of multifocal pneumonia affecting the right lung.   Small   right pleural effusion. XR CHEST PORTABLE   Final Result   Unchanged multifocal right-sided pneumonia. XR CHEST PORTABLE   Final Result   Stable multifocal airspace disease in the right lung with probable upper lobe   abscess. XR CHEST PORTABLE   Final Result   Stable multifocal airspace disease in the right lung, including probable   pulmonary abscess in the right upper lobe. CT CHEST WO CONTRAST   Final Result   1. Mixed consolidative and ground-glass opacities as well as interstitial   thickening throughout the majority of the right lung compatible with   pneumonia. 2. There is a cavity in the right upper lobe demonstrating an air-fluid level   measuring up to 9.7 cm concerning for abscess formation. 3. Trace right pleural effusion. 4. Prominent and enlarged mediastinal lymph nodes, likely reactive. 5. Stable 6 mm left lower lobe pulmonary nodule. RECOMMENDATIONS:   Fleischner Society guidelines for follow-up and management of incidentally   detected pulmonary nodules:      Single Solid Nodule:      Nodule size less than 6 mm   In a low-risk patient, no routine follow-up. In a high-risk patient, optional CT at 12 months. Nodule size equals 6-8 mm   In a low-risk patient, CT at 6-12 months, then consider CT at 18-24 months. In a high-risk patient, CT at 6-12 months, then CT at 18-24 months. Nodule size greater than 8 mm         In a low-risk patient, consider CT at 3 months, PET/CT, or tissue sampling. In a high-risk patient, consider CT at 3 months, PET/CT, or tissue sampling. Multiple Solid Nodules:      Nodule size less than 6 mm   In a low-risk patient, no routine follow-up. In a high-risk patient, optional CT at 12 months. Nodule size equals 6-8 mm   In a low-risk patient, CT at 3-6 months, then consider CT at 18-24 months. In a high-risk patient, CT at 3-6 months, then CT at 18-24 months.       Nodule size greater than 8 mm   In a low-risk patient, CT at 3-6 months, then consider CT at 18-24 months. In a high-risk patient, CT at 3-6 months, then CT at 18-24 months. - Low risk patients include individuals with minimal or absent history of   smoking and other known risk factors. - High risk patients include individuals with a history or smoking or known   risk factors. Radiology 2017 http://pubs. rsna.org/doi/full/10.1148/radiol. 3402373613         XR CHEST PORTABLE   Final Result   1. Increased pneumonia throughout the right lung remaining most prominent in   the right upper lobe. 2. Emphysema better demonstrated on CT. 3. Possible trace right pleural effusion.          XR CHEST PORTABLE   Final Result   Improved aeration of the right chest with persistent consolidation in the mid   to upper right chest.         CT CHEST WO CONTRAST    (Results Pending)           Assessment/Plan:    Active Hospital Problems    Diagnosis Date Noted    Acute respiratory insufficiency [R06.89] 08/04/2022     Priority: Medium    Acute blood loss anemia [D62] 08/04/2022     Priority: Medium    Hypotension due to drugs [I95.2] 08/04/2022     Priority: Medium    Massive hemoptysis [R04.2] 08/03/2022     Priority: Medium    Epistaxis [R04.0] 08/03/2022     Priority: Medium    Peripherally inserted central catheter (PICC) in place [Z45.2] 07/31/2022     Priority: Medium    NSTEMI (non-ST elevated myocardial infarction) [address] [I21.4] 07/26/2022     Priority: Medium    Necrotizing pneumonia ([facility] [J85.0] 07/26/2022     Priority: Medium    Multifocal pneumonia [J18.9] 07/26/2022     Priority: Medium    Pseudomonas respiratory infection [J98.8, B96.5] 07/26/2022     Priority: Medium    Abnormal CT of the chest [R93.89] 07/26/2022     Priority: Medium    Acute on chronic respiratory failure with hypoxia and hypercapnia (HCC) [J96.21, J96.22] 07/18/2022     Priority: Medium    Abnormal EKG [R94.31] 07/18/2022     Priority: Medium    Chronic obstructive

## 2022-08-04 NOTE — PLAN OF CARE
Problem: Discharge Planning  Goal: Discharge to home or other facility with appropriate resources  Outcome: Progressing     Problem: Pain  Goal: Verbalizes/displays adequate comfort level or baseline comfort level  8/4/2022 1843 by Emily Hudson RN  Outcome: Progressing  8/4/2022 0612 by Gallo Guzman RN  Outcome: Progressing  Flowsheets (Taken 8/3/2022 2015)  Verbalizes/displays adequate comfort level or baseline comfort level:   Assess pain using appropriate pain scale   Administer analgesics based on type and severity of pain and evaluate response     Problem: Confusion  Goal: Confusion, delirium, dementia, or psychosis is improved or at baseline  Description: INTERVENTIONS:  1. Assess for possible contributors to thought disturbance, including medications, impaired vision or hearing, underlying metabolic abnormalities, dehydration, psychiatric diagnoses, and notify attending LIP  2. [place] high risk fall precautions, as indicated  3. Provide frequent short contacts to provide reality reorientation, refocusing and direction  4. Decrease environmental stimuli, including noise as appropriate  5. Monitor and intervene to maintain adequate nutrition, hydration, elimination, sleep and activity  6. If unable to ensure safety without constant attention obtain sitter and review sitter guidelines with assigned personnel  7. Initiate Psychosocial CNS and Spiritual Care consult, as indicated  8/4/2022 1843 by Emily Hudson RN  Outcome: Progressing  8/4/2022 0612 by Gallo Guzman RN  Outcome: Progressing     Problem: Skin/Tissue Integrity  Goal: Absence of new skin breakdown  Description: 1. Monitor for areas of redness and/or skin breakdown  2. Assess vascular access sites hourly  3. Every 4-6 hours minimum:  Change oxygen saturation probe site  4.   Every 4-6 hours:  If on nasal continuous positive airway pressure, respiratory therapy assess nares and determine need for appliance change or resting period. 8/4/2022 1843 by Travon Cleary RN  Outcome: Progressing  8/4/2022 0612 by Francia Fox RN  Outcome: Progressing     Problem: Safety - Adult  Goal: Free from fall injury  8/4/2022 1843 by Travon Cleary RN  Outcome: Progressing  8/4/2022 0612 by Francia Fox RN  Outcome: Progressing     Problem: ABCDS Injury Assessment  Goal: Absence of physical injury  8/4/2022 1843 by Travon Cleary RN  Outcome: Progressing  8/4/2022 0612 by Francia Fox RN  Outcome: Progressing     Problem: Chronic Conditions and Co-morbidities  Goal: Patient's chronic conditions and co-morbidity symptoms are monitored and maintained or improved  8/4/2022 1843 by Travon Cleary RN  Outcome: Progressing  8/4/2022 0612 by Francia Fox RN  Outcome: Progressing  Flowsheets (Taken 8/3/2022 2015)  Care Plan - Patient's Chronic Conditions and Co-Morbidity Symptoms are Monitored and Maintained or Improved: Monitor and assess patient's chronic conditions and comorbid symptoms for stability, deterioration, or improvement     Problem: Nutrition Deficit:  Goal: Optimize nutritional status  8/4/2022 1843 by Travno Cleary RN  Outcome: Progressing  8/4/2022 0612 by Francia Fox RN  Outcome: Progressing     Problem: Safety - Medical Restraint  Goal: Remains free of injury from restraints (Restraint for Interference with Medical Device)  Description: INTERVENTIONS:  1. Determine that other, less restrictive measures have been tried or would not be effective before applying the restraint  2. Evaluate the patient's condition at the time of restraint application  3. Inform patient/family regarding the reason for restraint  4.  Q2H: Monitor safety, psychosocial status, comfort, nutrition and hydration  8/4/2022 1843 by Travon Cleary RN  Outcome: Progressing  8/4/2022 0612 by Francia Fox RN  Outcome: Progressing  Flowsheets (Taken 8/3/2022 2200)  Remains free of injury from restraints (restraint for interference with medical device): Determine that other, less restrictive measures have been tried or would not be effective before applying the restraint   Evaluate the patient's condition at the time of restraint application   Every 2 hours: Monitor safety, psychosocial status, comfort, nutrition and hydration

## 2022-08-04 NOTE — PROGRESS NOTES
Infectious Disease Follow up Notes  Admit Date: 7/18/2022  Hospital Day: 18    Antibiotics :   IV Meropenem  Inhaled Tobramycin     CHIEF COMPLAINT:      Rt UL lung abscess  Severe Necrotizing PNA  Pseudomonas PNA  Hypoxic resp failure      Subjective interval History :  78 y. o.woman with a past medical history of COPD, depression, chronic hypoxic respiratory failure now on   4 L of oxygen via nasal cannula, diabetes recent admission for COPD exacerbation and pneumonia diagnosed to have Pseudomonas based on sputum studies. She was on IV cefepime transition to oral levofloxacin on discharge. She was sent to [address] rehab facility and now admitted because of worsening shortness of breath increased sputum production. Sputum culture again on this admission positive for Pseudomonas with some resistance pattern, it is now become resistant to quinolones. CT chest on this admission with progressive pneumonia right upper lobe cavitary lesion possible lung abscess. Extensive consolidation  see images -  This is a new finding on the CT chest compared to her previous CT chest on 6/20/22. Given the necrotizing pneumonia with Pseudomonas infection and lung abscess formation we are consulted for recommendations. Interval History : Remains in ICU just got extubated after SBT trial.  No further bleeding noted from her right nostril she has a [employer] in place. She did require transfusion overnight per RN. Tolerating antibiotic okay      Past Medical History:    Past Medical History:   Diagnosis Date    CAD (coronary artery disease) 07/19/2022    NSTEMI, PCI LCx    Chronic pain syndrome     Percocet.  Dr. Olivier Cota    Colorectal polyps     COPD (chronic obstructive pulmonary disease) ([facility]     CTS (carpal tunnel syndrome)     BILATERAL    DDD (degenerative disc disease), lumbar     Depression     Family hx of colon cancer Fibromyalgia     Hyperlipemia     IBS (irritable bowel syndrome)     Lumbar spondylosis     New onset type 2 diabetes mellitus ([facility] [address] 02/03/2020    On home O2     4L    Pneumonia 07/2022    P.aer    Urticaria, chronic        Past Surgical History:    Past Surgical History:   Procedure Laterality Date    BRONCHOSCOPY N/A 8/3/2022    BRONCHOSCOPY performed by Georgie Blackman DO at [facility] Bilateral     CATARACT EXTRACTION      CERVICAL POLYP REMOVAL  09/2004    CORONARY ANGIOPLASTY WITH STENT PLACEMENT  07/19/2022    LCx 99. PCI/stent. INTRACAPSULAR CATARACT EXTRACTION Left 06/23/2020    Phacoemulsification with intraocular lens implant performed by Jacquie Lentz MD at [address] Right 07/14/2020    Phacoemulsification with intraocular lens implant performed by Jacquie Lentz MD at [address]      patient denies        Current Medications:    Outpatient Medications Marked as Taking for the 7/18/22 encounter [facility] Encounter)   Medication Sig Dispense Refill    aspirin 81 MG EC tablet Take 1 tablet by mouth in the morning. 30 tablet 3    prasugrel (EFFIENT) 10 MG TABS Take 1 tablet by mouth in the morning. 30 tablet 1       Allergies:  Patient has no known allergies.     Immunizations :   Immunization History   Administered Date(s) Administered    COVID-19, MODERNA BLUE border, Primary or Immunocompromised, (age 12y+), IM, 100 mcg/0.5mL 03/08/2021, 04/05/2021    COVID-19, MODERNA Booster BLUE border, (age 18y+), IM, 50mcg/0.25mL 12/01/2021    Influenza Vaccine, unspecified formulation 01/10/2017    Influenza, High Dose (Fluzone 65 yrs and older) 11/04/2013, 01/21/2016, 09/01/2017, 10/30/2018, 09/18/2020    Influenza, Quadv, adjuvanted, 65 yrs +, IM, PF (Fluad) 09/27/2021    Influenza, Triv, inactivated, subunit, adjuvanted, IM (Fluad 65 yrs and older) 09/13/2019 Pneumococcal Conjugate 13-valent ([id number]) 01/19/2015    Pneumococcal Conjugate Vaccine 01/10/2017    Pneumococcal Polysaccharide ([id number]) 10/12/2012    Tdap (Boostrix, Adacel) 07/17/2007    Zoster Recombinant (Shingrix) 11/21/2019       Social History:     Social History     Tobacco Use    Smoking status: Every Day     Packs/day: 1.00     Years: 55.00     Pack years: 55.00     Types: Cigarettes     Start date: 1/1/1962    Smokeless tobacco: Never    Tobacco comments:     almost ready to quit   Vaping Use    Vaping Use: Never used   Substance Use Topics    Alcohol use: No     Alcohol/week: 0.0 standard drinks    Drug use: No     Social History     Tobacco Use   Smoking Status Every Day    Packs/day: 1.00    Years: 55.00    Pack years: 55.00    Types: Cigarettes    Start date: 1/1/1962   Smokeless Tobacco Never   Tobacco Comments    almost ready to quit      Family History   Problem Relation Age of Onset    Cancer Father         COLON     Alzheimer's Disease Mother     Heart Disease Brother     Heart Failure Brother     Diabetes Daughter     Diabetes Daughter     Diabetes Daughter           REVIEW OF SYSTEMS:      Not possible due to mentation              PHYSICAL EXAM:      Vitals:    /64   Pulse 85   Temp 98.6 °F (37 °C) (Axillary)   Resp 18   Ht 5' 4\" (1.626 m)   Wt 129 lb 6.6 oz (58.7 kg)   SpO2 (!) 89%   BMI 22.21 kg/m²     General Appearance: awake and in some  acute distress, ++  pallor,Epistaxis Rhino rocket in place Venturi mask+   6lts   Skin: warm and dry, no rash or erythema  Head: normocephalic and atraumatic  Eyes: pupils equal, round, and reactive to light, conjunctivae normal  ENT: tympanic membrane, external ear and ear canal normal bilaterally, nose without deformity, nasal mucosa and turbinates normal without polyps  Neck: supple and non-tender without mass, no thyromegaly  no cervical lymphadenopathy  Pulmonary/Chest:  Rt UL coarse crepts+ +  wheezes, rales or rhonchi, normal air movement, in some respiratory distress  Cardiovascular: normal rate, regular rhythm, normal S1 and S2, no murmurs, rubs, clicks, or gallops, no carotid bruits  Abdomen: soft, non-tender, non-distended, normal bowel sounds, no masses or organomegaly  Extremities: no cyanosis, clubbing or edema  Musculoskeletal: normal range of motion, no joint swelling, deformity or tenderness  Integumentary: No rashes, no abnormal skin lesions, no petechiae  Neurologic: reflexes normal and symmetric, no cranial nerve deficit           Lines: PICC     Data Review:    CBC:   Lab Results   Component Value Date    WBC 9.4 08/04/2022    HGB 7.9 (L) 08/04/2022    HCT 23.5 (L) 08/04/2022    MCV 91.0 08/04/2022     (H) 08/04/2022     RENAL:   Lab Results   Component Value Date    CREATININE <0.5 (L) 08/04/2022    BUN 12 08/04/2022     (L) 08/04/2022    K 4.3 08/04/2022     08/04/2022    CO2 25 08/04/2022     SED RATE: No results found for: SEDRATE  CK: No results found for: CKTOTAL  CRP: No results found for: CRP  Hepatic Function Panel:   Lab Results   Component Value Date/Time    ALKPHOS 88 07/18/2022 05:20 PM    ALT 65 07/18/2022 05:20 PM    AST 61 07/18/2022 05:20 PM    PROT 5.6 07/18/2022 05:20 PM    PROT 6.9 10/12/2012 11:00 AM    BILITOT 0.5 07/18/2022 05:20 PM    LABALBU 2.4 07/18/2022 05:20 PM     UA:  Lab Results   Component Value Date/Time    COLORU Yellow 07/22/2022 06:31 AM    CLARITYU Clear 07/22/2022 06:31 AM    GLUCOSEU Negative 07/22/2022 06:31 AM    BILIRUBINUR Negative 07/22/2022 06:31 AM    BILIRUBINUR neg 10/17/2019 11:50 AM    KETUA TRACE 07/22/2022 06:31 AM    SPECGRAV 1.049 07/22/2022 06:31 AM    BLOODU Negative 07/22/2022 06:31 AM    PHUR 6.5 07/22/2022 06:31 AM    PROTEINU 30 07/22/2022 06:31 AM    UROBILINOGEN 0.2 07/22/2022 06:31 AM    NITRU Negative 07/22/2022 06:31 AM    LEUKOCYTESUR Negative 07/22/2022 06:31 AM    LABMICR YES 07/22/2022 06:31 AM    URINETYPE NotGiven 07/22/2022 06:31 AM Urine Microscopic:   Lab Results   Component Value Date/Time    LABCAST 10-20 Hyaline 01/10/2017 06:19 PM    BACTERIA None Seen 07/22/2022 06:31 AM    COMU see below 07/03/2022 03:11 PM    HYALCAST 2 07/22/2022 06:31 AM    WBCUA 2 07/22/2022 06:31 AM    RBCUA 5 07/22/2022 06:31 AM    EPIU 1 07/22/2022 06:31 AM     Urine Reflex to Culture:   Lab Results   Component Value Date/Time    URRFLXCULT Not Indicated 07/03/2022 03:11 PM         MICRO: cultures reviewed and updated by me   Blood Culture:          Culture, Respiratory [[id number]] (Abnormal)  Collected: 07/22/22 1200   Order Status: Completed Specimen: Sputum Expectorated Updated: 07/24/22 0801    CULTURE, RESPIRATORY Rare growth normal respiratory fabiana with Abnormal     Gram Stain Result No Epithelial Cells seen   3+ WBC's (Polymorphonuclear)   No organisms seen     Organism Pseudomonas aeruginosa Abnormal     CULTURE, RESPIRATORY Light growth   Narrative:     ORDER#: [id number]                          ORDERED BY: CIRA KEBEDE   SOURCE: Sputum Expectorated                COLLECTED:  07/22/22 12:00   ANTIBIOTICS AT GURWINDER.:                      RECEIVED :  07/22/22 12:22   Respiratory Culture [[id number]] (Abnormal)  Collected: 07/19/22 2030   Order Status: Completed Specimen: Sputum Expectorated Updated: 07/23/22 0748    CULTURE, RESPIRATORY Light growth normal respiratory fabiana with Abnormal     Gram Stain Result 2+ Gram positive cocci   2+ WBC's (Polymorphonuclear)   1+ Epithelial Cells     Organism Pseudomonas aeruginosa Abnormal     CULTURE, RESPIRATORY Light growth   Narrative:     ORDER#: [id number]                          ORDERED BY: FARZANA GAN   SOURCE: Sputum Expectorated                COLLECTED:  07/19/22 20:30   ANTIBIOTICS AT GURWINDER.:                      RECEIVED :  07/19/22 21:19   Culture, Blood 1 [[id number]] Collected: 07/18/22 1841   Order Status: Completed Specimen: Blood Updated: 07/22/22 2015    Blood Culture, Routine No Growth after 4 days of incubation. Narrative:     ORDER#: [id number]                          ORDERED BY: Jordy Kuo   SOURCE: Blood                              COLLECTED:  07/18/22 18:41   ANTIBIOTICS AT GURWINDER.:                      RECEIVED :  07/18/22 18:56   If child <=2 yrs old please draw pediatric bottle. ~Blood Culture 1   Culture, Blood 2 [[id number]] Collected: 07/18/22 1850   Order Status: Completed Specimen: Blood Updated: 07/22/22 2015    Culture, Blood 2 No Growth after 4 days of incubation. Narrative:     ORDER#: [id number]                          ORDERED BY: Jordy Kuo   SOURCE: Blood                              COLLECTED:  07/18/22 18:50   ANTIBIOTICS AT GURWINDER.:                      RECEIVED :  07/18/22 18:56   If child <=2 yrs old please draw pediatric bottle. ~Blood Culture #2   Strep Pneumoniae Antigen [[id number]] Collected: 07/20/22 1020   Order Status: Completed Specimen: Urine, clean catch Updated: 07/21/22 0841    STREP PNEUMONIAE ANTIGEN, URINE --    Presumptive Negative   Presumptive negative suggests no current or recent   pneumococcal infection. Infection due to Strep pneumoniae   cannot be ruled out since the antigen present in the sample   may be below the detection limit of the test.   Normal Range:Presumptive Negative    Narrative:     ORDER#: [id number]                          ORDERED BY: FARZANA GAN   SOURCE: Urine Clean Catch                  COLLECTED:  07/20/22 10:20   ANTIBIOTICS AT GURWINDER.:                      RECEIVED :  07/20/22 10:27   Legionella antigen, urine [[id number]] Collected: 07/20/22 1020   Order Status: Completed Specimen: Urine, catheter Updated: 07/21/22 0840    L. pneumophila Serogp 1 Ur Ag --    Presumptive Negative   No Legionella pneumophila serogroup 1 antigens detected.    A negative result does not exclude infection with   Legionella pneumophila serogroup 1 nor does it rule out   other microbial-caused respiratory infections or   disease caused by other serogroups of   Legionella pneumophila. Normal Range: Presumptive Negative    Narrative:     ORDER#: [id number]                          ORDERED BY: FARZANA GAN   SOURCE: Urine Catheter                     COLLECTED:  07/20/22 10:20   ANTIBIOTICS AT GURWINDER.:                      RECEIVED :  07/20/22 10:28   Sputum gram stain [[id number]] Collected: 07/19/22 2030   Order Status: Canceled Specimen: Sputum Expectorated    Rapid influenza A/B antigens [[id number]] Collected: 07/19/22 0210   Order Status: Completed Specimen: Nares Updated: 07/19/22 0245    Rapid Influenza A Ag Negative    Rapid Influenza B Ag Negative   COVID-19, Rapid [[id number]] Collected: 07/18/22 1825   Order Status: Completed Specimen: Nasopharyngeal Swab Updated: 07/18/22 1854    SARS-CoV-2, NAAT Not Detected    Comment: Rapid NAAT:   Negative results should be treated as presumptive and,   if inconsistent with clinical signs and symptoms or necessary for   patient management, should be tested with an alternative molecular   assay. Negative results do not preclude SARS-CoV-2 infection and   should not be used as the sole basis for patient management decisions. This test has been authorized by the FDA under an Emergency Use   Authorization (EUA) for use by authorized laboratories.      Fact sheet for Healthcare Providers:   BuildHer.es   Fact sheet for Patients: BuildHer.es     METHODOLOGY: Isothermal Nucleic Acid Amplification         CULTURE, RESPIRATORY Rare growth normal respiratory fabiana with Abnormal     Gram Stain Result No Epithelial Cells seen   3+ WBC's (Polymorphonuclear)   No organisms seen    Organism Pseudomonas aeruginosa Abnormal     CULTURE, RESPIRATORY Light growth    Resulting Agency [address] [facility]          Susceptibility      Pseudomonas aeruginosa (1)    Antibiotic Interpretation Microscan  Method Status    cefepime Sensitive 8 mcg/mL BACTERIAL SUSCEPTIBILITY PANEL BY TIERRA     ciprofloxacin Resistant >2 mcg/mL BACTERIAL SUSCEPTIBILITY PANEL BY TIERRA     gentamicin Sensitive <=4 mcg/mL BACTERIAL SUSCEPTIBILITY PANEL BY TIERRA     meropenem Sensitive <=1 mcg/mL BACTERIAL SUSCEPTIBILITY PANEL BY TIERRA     piperacillin-tazobactam Sensitive <=16 mcg/mL BACTERIAL SUSCEPTIBILITY PANEL BY TIERRA     tobramycin Sensitive <=4 mcg/mL BACTERIAL SUSCEPTIBILITY PANEL BY TIERRA          Narrative  Performed by: [facility]  ORDER#: [id number]                          ORDERED BY: CIRA KEBEDE   SOURCE: Sputum Expectorated                COLLECTED:  07/22/22 12:00   ANTIBIOTICS AT GURWINDER.:                      RECEIVED :  07/22/22 12:22           Lab Results   Component Value Date/Time    BC No Growth after 4 days of incubation. 07/18/2022 06:41 PM    BLOODCULT2 No Growth after 4 days of incubation. 07/18/2022 06:50 PM       Respiratory Culture:  Lab Results   Component Value Date/Time    CULTRESP Normal respiratory fabiana 07/30/2022 04:00 PM    LABGRAM  07/30/2022 04:00 PM     3+ WBC's (Mononuclear)  1+ Epithelial Cells  1+ Gram positive cocci       AFB:No results found for: AFBSMEAR  Viral Culture:  Lab Results   Component Value Date/Time    COVID19 Not Detected 08/01/2022 01:55 PM     Urine Culture: No results for input(s): LABURIN in the last 72 hours. IMAGING:    XR CHEST PORTABLE   Final Result   Endotracheal tube with the tip approximately 1 cm above the song. Consider   retraction by approximately 3.5 cm. Enteric tube with the tip and the side   hole below the diaphragm overlying the left upper abdomen, likely within the   fundus of the stomach. Redemonstration of multifocal pneumonia affecting the right lung. Small   right pleural effusion. XR CHEST PORTABLE   Final Result   Unchanged multifocal right-sided pneumonia. XR CHEST PORTABLE   Final Result   Stable multifocal airspace disease in the right lung with probable upper lobe   abscess. XR CHEST PORTABLE   Final Result   Stable multifocal airspace disease in the right lung, including probable   pulmonary abscess in the right upper lobe. CT CHEST WO CONTRAST   Final Result   1. Mixed consolidative and ground-glass opacities as well as interstitial   thickening throughout the majority of the right lung compatible with   pneumonia. 2. There is a cavity in the right upper lobe demonstrating an air-fluid level   measuring up to 9.7 cm concerning for abscess formation. 3. Trace right pleural effusion. 4. Prominent and enlarged mediastinal lymph nodes, likely reactive. 5. Stable 6 mm left lower lobe pulmonary nodule. RECOMMENDATIONS:   Fleischner Society guidelines for follow-up and management of incidentally   detected pulmonary nodules:      Single Solid Nodule:      Nodule size less than 6 mm   In a low-risk patient, no routine follow-up. In a high-risk patient, optional CT at 12 months. Nodule size equals 6-8 mm   In a low-risk patient, CT at 6-12 months, then consider CT at 18-24 months. In a high-risk patient, CT at 6-12 months, then CT at 18-24 months. Nodule size greater than 8 mm         In a low-risk patient, consider CT at 3 months, PET/CT, or tissue sampling. In a high-risk patient, consider CT at 3 months, PET/CT, or tissue sampling. Multiple Solid Nodules:      Nodule size less than 6 mm   In a low-risk patient, no routine follow-up. In a high-risk patient, optional CT at 12 months. Nodule size equals 6-8 mm   In a low-risk patient, CT at 3-6 months, then consider CT at 18-24 months. In a high-risk patient, CT at 3-6 months, then CT at 18-24 months. Nodule size greater than 8 mm   In a low-risk patient, CT at 3-6 months, then consider CT at 18-24 months. In a high-risk patient, CT at 3-6 months, then CT at 18-24 months.       - Low risk patients include individuals with minimal or absent history of   smoking and other known risk factors. - High risk patients include individuals with a history or smoking or known   risk factors. Radiology 2017 http://pubs. rsna.org/doi/full/10.1148/radiol. 6474873648         XR CHEST PORTABLE   Final Result   1. Increased pneumonia throughout the right lung remaining most prominent in   the right upper lobe. 2. Emphysema better demonstrated on CT. 3. Possible trace right pleural effusion. XR CHEST PORTABLE   Final Result   Improved aeration of the right chest with persistent consolidation in the mid   to upper right chest.         CT CHEST WO CONTRAST    (Results Pending)     XR CHEST PORTABLE   Final Result   1. Increased pneumonia throughout the right lung remaining most prominent in   the right upper lobe. 2. Emphysema better demonstrated on CT. 3. Possible trace right pleural effusion. XR CHEST PORTABLE   Final Result   Improved aeration of the right chest with persistent consolidation in the mid   to upper right chest.                All pertinent images and reports for the current Hospitalization were reviewed by me.        Scheduled Meds:   magnesium sulfate  4,000 mg IntraVENous Once    fluconazole  100 mg Oral Daily    [Held by provider] enoxaparin  40 mg SubCUTAneous Daily    meropenem  1,000 mg IntraVENous Q8H    tobramycin (PF)  300 mg Nebulization BID    [Held by provider] aspirin  81 mg Oral Daily    nystatin  5 mL Oral 4x Daily    [Held by provider] prasugrel  10 mg Oral Daily    sodium chloride flush  5-40 mL IntraVENous 2 times per day    atorvastatin  40 mg Oral Nightly    tiZANidine  4 mg Oral Nightly    gabapentin  600 mg Oral BID    DULoxetine  60 mg Oral BID    mometasone-formoterol  2 puff Inhalation BID       Continuous Infusions:   norepinephrine Stopped (08/04/22 0538)    propofol 5 mcg/kg/min (08/04/22 0543)    fentaNYL 100 mcg/hr (08/04/22 0108)    sodium chloride 5 mL/hr at 07/25/22 0502    dextrose         PRN Meds:  fentaNYL **AND** fentaNYL, sodium chloride, sodium chloride flush, sodium chloride, acetaminophen, perflutren lipid microspheres, traZODone, ipratropium-albuterol, cetirizine, albuterol sulfate HFA, glucose, dextrose bolus **OR** dextrose bolus, glucagon (rDNA), dextrose, ondansetron, polyethylene glycol      Assessment:     Patient Active Problem List   Diagnosis    Osteopenia-last dexa 2009 repeat 2015 (-2.4 hip)--advised tx    Colon polyp, hyperplastic,-(done 3/11-repeat 3-5 yrs--dr Patricia Lopez)    Family history of colon cancer    CTS (carpal tunnel syndrome)BILATERAL-s/p surgical repair--resolved     Postmenopausal status-last mammogram 2/15 wnl last pap 12/13--wnl    Nondependent alcohol abuse, in remission    Urticaria, chronic    Smoking    Chronic back pain-(djd) saw dr Morelia Stewart afford surgery--seeing dr Danial Young for pain meds    Fibromyalgia    Hypercholesteremia    Lumbar spondylosis    Vitamin D deficiency--severe--started 50,000 iu 2x/ wk 11/13    Insomnia--on elevil w help    Constipation--on daily miralax    Depression    Chronic pain syndrome    Muscle cramping    Acute on chronic respiratory failure with hypercapnia (HCC)    ROLY (acute kidney injury) ([address]    Severe sepsis (HCC)    Gastroesophageal reflux disease    COPD, severe ([address]    Chronic hypoxemic respiratory failure ([address]    Degeneration of lumbar or lumbosacral intervertebral disc    Trochanteric bursitis of right hip    COPD exacerbation ([address]    Diabetes ([address]    Controlled type 2 diabetes mellitus without complication, without long-term current use of insulin ([address]    Age-related osteoporosis without current pathological fracture- started alendronate 9/2021    Pulmonary nodule seen on imaging study    Pneumonia of right lung due to infectious organism    General weakness    Elevated lactic acid level    Community acquired pneumonia of right lung    Chronic obstructive pulmonary disease ([address]    Acute respiratory failure with hypoxia ([address]    Acute on chronic respiratory failure with hypoxia and hypercapnia (HCC)    Abnormal EKG    NSTEMI (non-ST elevated myocardial infarction) (HCC)    Necrotizing pneumonia (HCC)    Multifocal pneumonia    Pseudomonas respiratory infection    Abnormal CT of the chest    Peripherally inserted central catheter (PICC) in place    Massive hemoptysis    Epistaxis    Acute respiratory insufficiency    Acute blood loss anemia    Hypotension due to drugs     Severe necrotizing Pseudomonas pneumonia  Right upper lobe cavitary lesion  Right right lung evolving abscess  Right lung dense consolidation of  Pseudomonas pneumonia developing some resistance  COPD exacerbation  Hypoxic respiratory failure  Coronary artery disease  Status post cardiac cath  Chronic smoking  Abnormal CT chest  BNP elevation  COVID-19 negative  Epistaxis severe vs Hemoptysis now intubated sedated on the ventilator for t airway protection 8/3/22       Unfortunately she developed progressive changes with dense consolidation and lung abscess secondary to Pseudomonas pneumonia. Pseudomonas appears to have developed resistance to quinolones while on therapy this is concerning. CT chest on this admission grossly abnormal and definitely there is progressive changes given the severity of the -pneumonia will need IV antibiotics     OPAT d/w pt she needs to QUIT smoking d/w pt and her family      unfortunately still has Severe hypoxia and respiratory distress required transfer to ICU now on BiPAP. Heart rate is elevated secondary to respiratory distress-. Not able to come off BiPAP for extended period of time transferred out to progressive care unit.     Given the lung findings will have to continue antibiotic therapy anticipate least 4 more weeks of duration of treatment     repeat sputum testing to see if Pseudomonas is clearing out on the current therapy-sputum repeat Normal fabiana noted      Now transferred to ICU secondary to massive epistaxis versus was  intubated on the ventilator status post bronchoscopy      Extubated on 8/4/22 and on Venturi mask no further bleeding has Rhinorocket in place ENT notes reviewed        Labs, Microbiology, Radiology and all the pertinent results from current hospitalization and  care every where were reviewed  by me as a part of the evaluation   Plan:   Cont  IV Meropenem 1 gm q 8 HR X stop date  x  8/27  2. Can cont   Tobramycin Neb if possible and does not interfere with Rhino rocket    Picc line in place   OPAT d/w pt  QUIT smoking  Will repeat CT chest in  3 weeks orders in place   Cont supportive care  CT images reviewed see above    Risk for progression  Extubated to Venturi mask  11. Repeat sputum clearing up  12. Robby DONE   13, ENT following for massive Epistaxis - controlled with Rhino rocket           Discussed with patient/Family and Nursing   Risk of Complications/Morbidity: High      Illness(es)/ Infection present that pose threat to bodily function. There is potential for severe exacerbation of infection/side effects of treatment. Therapy requires intensive monitoring for antimicrobial agent toxicity. Thanks for allowing me to participate in your patient's care and please call me with any questions or concerns.     Marybeth Aquino MD  Infectious Disease  [facility] ([facility]) Physician  Phone: [phone]   Fax : [phone]

## 2022-08-04 NOTE — PROCEDURES
Procedure performed on 8/3 (LATE ENTRY)      Intubation Procedure Note    Indication: respiratory failure and concern for massive hemoptysis, pneumonia, COPD    Consent: The patient provided verbal consent for this procedure. Procedure:  Sedation: midazolam and etomidate  Paralytic: rocuronium  Equipment: Glidescope with size 8 ETT used     View: Grade 1    Procedure: The cuff was then inflated and the tube was secured appropriately at a distance of 19 cm to the dental ridge    Confirmed by: bilateral breath sounds, an end tidal CO2 detector, absence of sounds over the stomach, tube fogging, adequate chest rise, adequate pulse oximetry reading, or improved skin color    There was pooled blood in the posterior pharynx with clot above vocal cords. Clot removed with suction. ETT placed easily     I have presented reasonable alternatives to the patient's proposed care, treatment, and services.  The discussion I have done encompassed risks, benefits, and side effects related to the alternatives and the risks related to not receiving the proposed care, treatment, and services

## 2022-08-04 NOTE — PROGRESS NOTES
[facility] [facility]  Progress note      Patient Name: Yanique Meyer  Medical Record Number:  [id number]  Primary Care Physician:  HECTOR Arteaga CNP  Date of Consultation: 8/4/2022      Interval History  Patient currently still intubated. According to nursing she has had no significant bleeding. Blood thinners are being held. Did require blood transfusion for hypotension and anemia. PHYSICAL EXAM  Patient has 2 Rhino Rocket's in the right nasal cavity. I deflated the air out of the inferior 1. Endotracheal tube is in place and patent without any bright red blood. ASSESSMENT/PLAN  Epistaxis  -Currently resolved  -I deflated the cuff from one of the RhinoRocket's. -ICU planning to extubate today  -Tentative plan will be to remove one of the RhinoRocket tomorrow and likely the other 1 over the weekend if she has no bleeding  -Hold anticoagulation until at least the RhinoRocket's are out               I have performed a head and neck physical exam personally or was physically present during the key or critical portions of the service. This note was generated completely or in part utilizing Dragon dictation speech recognition software. Occasionally, words are mistranscribed and despite editing, the text may contain inaccuracies due to incorrect word recognition. If further clarification is needed please contact the office at [phone].

## 2022-08-04 NOTE — PROGRESS NOTES
Pulmonary Progress Note    CC:  Follow up epistaxis, pneumonia, ventilator    Subjective:  She was transferred down to the ICU and intubated due to concern for massive hemoptysis. She was intubated easily and bronchoscopy did not show airway bleeding. IT was determined that she had massive epistaxis. She remains on vent. ENT packed her nares due to ongoing bleeding     FIO2 at 80%, PEEP at 5    Briefly on pressors likely due to sedation     Low urine output overnight, low Hb overnight and received another unit       Intake/Output Summary (Last 24 hours) at 8/4/2022 1028  Last data filed at 8/4/2022 0800  Gross per 24 hour   Intake 2990.47 ml   Output 1935 ml   Net 1055.47 ml         PHYSICAL EXAM:  Blood pressure (!) 96/55, pulse 94, temperature 97.9 °F (36.6 °C), temperature source Axillary, resp. rate 22, height 5' 4\" (1.626 m), weight 129 lb 6.6 oz (58.7 kg), SpO2 93 %, not currently breastfeeding.'  Gen: Chronically ill, lethargic   Eyes: PERRL. No sclera icterus. No conjunctival injection. ENT: No discharge. Pharynx with ETT. RIght nare with rhino rocket  Neck: Trachea midline. No obvious mass. Resp: Right lung rhonchi/crackles   CV: Regular rate. Regular rhythm. No murmur or rub. GI: Non-tender. Non-distended. No hernia. Skin: Warm, dry, normal texture and turgor. No nodule on exposed extremities. Lymph: No cervical LAD. No supraclavicular LAD. M/S: No cyanosis. No clubbing. No joint deformity.     Neuro: Lethargic  Ext:   no edema    Medications:    Scheduled Meds:   magnesium sulfate  4,000 mg IntraVENous Once    calcium gluconate-NaCl  1,000 mg IntraVENous Once    fluconazole  100 mg Oral Daily    [Held by provider] enoxaparin  40 mg SubCUTAneous Daily    meropenem  1,000 mg IntraVENous Q8H    tobramycin (PF)  300 mg Nebulization BID    [Held by provider] aspirin  81 mg Oral Daily    nystatin  5 mL Oral 4x Daily    [Held by provider] prasugrel  10 mg Oral Daily    sodium chloride flush 5-40 mL IntraVENous 2 times per day    atorvastatin  40 mg Oral Nightly    tiZANidine  4 mg Oral Nightly    gabapentin  600 mg Oral BID    DULoxetine  60 mg Oral BID    mometasone-formoterol  2 puff Inhalation BID       Continuous Infusions:   norepinephrine Stopped (22 0538)    propofol 5 mcg/kg/min (22 0543)    fentaNYL 100 mcg/hr (22 0108)    sodium chloride 5 mL/hr at 22 0502    dextrose         PRN Meds:  fentaNYL **AND** fentaNYL, sodium chloride, sodium chloride flush, sodium chloride, acetaminophen, perflutren lipid microspheres, traZODone, ipratropium-albuterol, cetirizine, albuterol sulfate HFA, glucose, dextrose bolus **OR** dextrose bolus, glucagon (rDNA), dextrose, ondansetron, polyethylene glycol    Labs:  CBC:   Recent Labs     2245 22  0540 22  0014 22   WBC 7.1 6.6  --  9.4   HGB 7.7* 7.7* 6.4* 7.9*   HCT 22.5* 22.9* 19.2* 23.5*   MCV 90.3 90.7  --  91.0   * 559*  --  526*     BMP:   Recent Labs     2245 2240 22    136 134*   K 4.5 4.1 4.3    100 101   CO2 31 29 25   PHOS  --   --  3.2   BUN 9 10 12   CREATININE 0.6 <0.5* <0.5*     LIVER PROFILE: No results for input(s): AST, ALT, LIPASE, BILIDIR, BILITOT, ALKPHOS in the last 72 hours. Invalid input(s): AMYLASE,  ALB  PT/INR: No results for input(s): PROTIME, INR in the last 72 hours. APTT: No results for input(s): APTT in the last 72 hours. UA:No results for input(s): NITRITE, COLORU, PHUR, LABCAST, WBCUA, RBCUA, MUCUS, TRICHOMONAS, YEAST, BACTERIA, CLARITYU, SPECGRAV, LEUKOCYTESUR, UROBILINOGEN, BILIRUBINUR, BLOODU, GLUCOSEU, AMORPHOUS in the last 72 hours. Invalid input(s): Willaim Furnish  No results for input(s): PH, PCO2, PO2 in the last 72 hours.         Films:  Chest imaging reports were reviewed and imaging was reviewed by me and showed no new films     AB.[id number]    Cultures:  Sputum:  pseudomonas    I reviewed the labs and images listed above    Assessment/Plan:   Acute Respiratory Insufficiency due to epistaxis/airway compromise   SBT when off sedation   May need lasix to successfully extubate  Check BNP since she has received IV hydration and 2 units of PRBC  Massive Epistaxis s/p Rhinorocket  ENT following. Rhinorocket to remain at least until tomorrow  Acute Blood Loss Anemia s/p two units of blood   Goal Hb is >7  No chemical prophylaxis   Hypotension related to sedatives   Wean off sedation  Levophed if needed  Hold additional IV fluids  Necrotizing Pneumonia due to pseudomonas   Merrem, Arnie per ID  Severe COPD  Bronchodilators     Replace Mg  GI prophylaxis  Pepcid  DVT prophylaxis  SCD    Critical care time of 31 minutes. This does not include procedural time. Please see procedural notes for details.     DO Regin[facility]

## 2022-08-04 NOTE — PLAN OF CARE
Problem: Discharge Planning  Goal: Discharge to home or other facility with appropriate resources  Recent Flowsheet Documentation  Taken 8/3/2022 2015 by Mary Huerta RN  Discharge to home or other facility with appropriate resources: Identify barriers to discharge with patient and caregiver  8/3/2022 1851 by Dona Stephen RN  Outcome: Progressing     Problem: Pain  Goal: Verbalizes/displays adequate comfort level or baseline comfort level  8/4/2022 0612 by Mary Huerta RN  Outcome: Progressing  Flowsheets (Taken 8/3/2022 2015)  Verbalizes/displays adequate comfort level or baseline comfort level:   Assess pain using appropriate pain scale   Administer analgesics based on type and severity of pain and evaluate response  8/3/2022 1851 by Dona Stephen RN  Outcome: Progressing     Problem: Confusion  Goal: Confusion, delirium, dementia, or psychosis is improved or at baseline  Description: INTERVENTIONS:  1. Assess for possible contributors to thought disturbance, including medications, impaired vision or hearing, underlying metabolic abnormalities, dehydration, psychiatric diagnoses, and notify attending LIP  2. [place] high risk fall precautions, as indicated  3. Provide frequent short contacts to provide reality reorientation, refocusing and direction  4. Decrease environmental stimuli, including noise as appropriate  5. Monitor and intervene to maintain adequate nutrition, hydration, elimination, sleep and activity  6. If unable to ensure safety without constant attention obtain sitter and review sitter guidelines with assigned personnel  7. Initiate Psychosocial CNS and Spiritual Care consult, as indicated  8/4/2022 0612 by Mary Huerta RN  Outcome: Progressing  8/3/2022 1851 by Dona Stephen RN  Outcome: Progressing     Problem: Skin/Tissue Integrity  Goal: Absence of new skin breakdown  Description: 1. Monitor for areas of redness and/or skin breakdown  2.   Assess vascular access sites hourly  3. Every 4-6 hours minimum:  Change oxygen saturation probe site  4. Every 4-6 hours:  If on nasal continuous positive airway pressure, respiratory therapy assess nares and determine need for appliance change or resting period. 8/4/2022 0612 by Vero Valentine RN  Outcome: Progressing  8/3/2022 1851 by Nicolasa Romberg, RN  Outcome: Progressing     Problem: Safety - Adult  Goal: Free from fall injury  8/4/2022 0612 by Vero Valentine RN  Outcome: Progressing  8/3/2022 1851 by Nicolasa Romberg, RN  Outcome: Progressing     Problem: ABCDS Injury Assessment  Goal: Absence of physical injury  8/4/2022 0612 by Vero Valentine RN  Outcome: Progressing  8/3/2022 1851 by Nicolasa Romberg, RN  Outcome: Progressing     Problem: Chronic Conditions and Co-morbidities  Goal: Patient's chronic conditions and co-morbidity symptoms are monitored and maintained or improved  8/4/2022 0612 by Vero Valentine RN  Outcome: Progressing  Flowsheets (Taken 8/3/2022 2015)  Care Plan - Patient's Chronic Conditions and Co-Morbidity Symptoms are Monitored and Maintained or Improved: Monitor and assess patient's chronic conditions and comorbid symptoms for stability, deterioration, or improvement  8/3/2022 1851 by Nicolasa Romberg, RN  Outcome: Progressing     Problem: Nutrition Deficit:  Goal: Optimize nutritional status  8/4/2022 0612 by Vero Valentine RN  Outcome: Progressing  8/3/2022 1851 by Nicolasa Romberg, RN  Outcome: Progressing     Problem: Safety - Medical Restraint  Goal: Remains free of injury from restraints (Restraint for Interference with Medical Device)  Description: INTERVENTIONS:  1. Determine that other, less restrictive measures have been tried or would not be effective before applying the restraint  2. Evaluate the patient's condition at the time of restraint application  3. Inform patient/family regarding the reason for restraint  4.  Q2H: Monitor safety, psychosocial status, comfort, nutrition and hydration  8/4/2022 0612 by Kyle Goodrich, RN  Outcome: Progressing  Flowsheets (Taken 8/3/2022 2200)  Remains free of injury from restraints (restraint for interference with medical device):   Determine that other, less restrictive measures have been tried or would not be effective before applying the restraint   Evaluate the patient's condition at the time of restraint application   Every 2 hours: Monitor safety, psychosocial status, comfort, nutrition and hydration  8/3/2022 1851 by Ne Anton RN  Outcome: Progressing

## 2022-08-04 NOTE — PROGRESS NOTES
Critical H&H reported to Dr. Antione Bhatia. See new orders to transfuse one unit PRBCs. Telephone consent for blood administration obtained.

## 2022-08-04 NOTE — PROGRESS NOTES
Pt suddenly becoming hypotensive after repositioning. [employer] message to Dr. Ana Lilia Prater requesting levophed to support BP. See new orders. Levophed started and rapidly titrated in order to maintain blood pressure, see eMar. Stat H&H obtained per order from Dr. Ana Lilia Prater.

## 2022-08-05 LAB
ANION GAP SERPL CALCULATED.3IONS-SCNC: 6 MMOL/L (ref 3–16)
BASOPHILS ABSOLUTE: 0 K/UL (ref 0–0.2)
BASOPHILS RELATIVE PERCENT: 0.6 %
BUN BLDV-MCNC: 12 MG/DL (ref 7–20)
CALCIUM SERPL-MCNC: 8 MG/DL (ref 8.3–10.6)
CHLORIDE BLD-SCNC: 97 MMOL/L (ref 99–110)
CO2: 30 MMOL/L (ref 21–32)
CREAT SERPL-MCNC: <0.5 MG/DL (ref 0.6–1.2)
EOSINOPHILS ABSOLUTE: 0.2 K/UL (ref 0–0.6)
EOSINOPHILS RELATIVE PERCENT: 2.6 %
GFR AFRICAN AMERICAN: >60
GFR NON-AFRICAN AMERICAN: >60
GLUCOSE BLD-MCNC: 97 MG/DL (ref 70–99)
HCT VFR BLD CALC: 22.1 % (ref 36–48)
HEMOGLOBIN: 7.4 G/DL (ref 12–16)
LYMPHOCYTES ABSOLUTE: 1.5 K/UL (ref 1–5.1)
LYMPHOCYTES RELATIVE PERCENT: 18.1 %
MAGNESIUM: 2.1 MG/DL (ref 1.8–2.4)
MCH RBC QN AUTO: 30.4 PG (ref 26–34)
MCHC RBC AUTO-ENTMCNC: 33.6 G/DL (ref 31–36)
MCV RBC AUTO: 90.5 FL (ref 80–100)
MONOCYTES ABSOLUTE: 1.1 K/UL (ref 0–1.3)
MONOCYTES RELATIVE PERCENT: 14.1 %
NEUTROPHILS ABSOLUTE: 5.2 K/UL (ref 1.7–7.7)
NEUTROPHILS RELATIVE PERCENT: 64.6 %
PDW BLD-RTO: 14.7 % (ref 12.4–15.4)
PHOSPHORUS: 2.7 MG/DL (ref 2.5–4.9)
PLATELET # BLD: 476 K/UL (ref 135–450)
PMV BLD AUTO: 6.9 FL (ref 5–10.5)
POTASSIUM REFLEX MAGNESIUM: 3.9 MMOL/L (ref 3.5–5.1)
RBC # BLD: 2.44 M/UL (ref 4–5.2)
SODIUM BLD-SCNC: 133 MMOL/L (ref 136–145)
WBC # BLD: 8 K/UL (ref 4–11)

## 2022-08-05 PROCEDURE — 36592 COLLECT BLOOD FROM PICC: CPT

## 2022-08-05 PROCEDURE — 97110 THERAPEUTIC EXERCISES: CPT

## 2022-08-05 PROCEDURE — 6360000002 HC RX W HCPCS: Performed by: INTERNAL MEDICINE

## 2022-08-05 PROCEDURE — 2580000003 HC RX 258: Performed by: INTERNAL MEDICINE

## 2022-08-05 PROCEDURE — 83735 ASSAY OF MAGNESIUM: CPT

## 2022-08-05 PROCEDURE — 6370000000 HC RX 637 (ALT 250 FOR IP): Performed by: NURSE PRACTITIONER

## 2022-08-05 PROCEDURE — 2500000003 HC RX 250 WO HCPCS: Performed by: NURSE PRACTITIONER

## 2022-08-05 PROCEDURE — 97530 THERAPEUTIC ACTIVITIES: CPT

## 2022-08-05 PROCEDURE — 2000000000 HC ICU R&B

## 2022-08-05 PROCEDURE — 99231 SBSQ HOSP IP/OBS SF/LOW 25: CPT | Performed by: OTOLARYNGOLOGY

## 2022-08-05 PROCEDURE — 85025 COMPLETE CBC W/AUTO DIFF WBC: CPT

## 2022-08-05 PROCEDURE — 94761 N-INVAS EAR/PLS OXIMETRY MLT: CPT

## 2022-08-05 PROCEDURE — 2700000000 HC OXYGEN THERAPY PER DAY

## 2022-08-05 PROCEDURE — 94640 AIRWAY INHALATION TREATMENT: CPT

## 2022-08-05 PROCEDURE — 84100 ASSAY OF PHOSPHORUS: CPT

## 2022-08-05 PROCEDURE — 6370000000 HC RX 637 (ALT 250 FOR IP): Performed by: INTERNAL MEDICINE

## 2022-08-05 PROCEDURE — 99233 SBSQ HOSP IP/OBS HIGH 50: CPT | Performed by: INTERNAL MEDICINE

## 2022-08-05 PROCEDURE — 80048 BASIC METABOLIC PNL TOTAL CA: CPT

## 2022-08-05 PROCEDURE — APPNB15 APP NON BILLABLE TIME 0-15 MINS: Performed by: NURSE PRACTITIONER

## 2022-08-05 PROCEDURE — 97164 PT RE-EVAL EST PLAN CARE: CPT

## 2022-08-05 RX ORDER — IPRATROPIUM BROMIDE AND ALBUTEROL SULFATE 2.5; .5 MG/3ML; MG/3ML
1 SOLUTION RESPIRATORY (INHALATION) EVERY 4 HOURS
Status: DISCONTINUED | OUTPATIENT
Start: 2022-08-05 | End: 2022-08-09

## 2022-08-05 RX ORDER — CALCIUM GLUCONATE 20 MG/ML
1000 INJECTION, SOLUTION INTRAVENOUS ONCE
Status: COMPLETED | OUTPATIENT
Start: 2022-08-05 | End: 2022-08-05

## 2022-08-05 RX ADMIN — CALCIUM GLUCONATE 1000 MG: 20 INJECTION, SOLUTION INTRAVENOUS at 09:17

## 2022-08-05 RX ADMIN — FLUCONAZOLE 100 MG: 100 TABLET ORAL at 09:18

## 2022-08-05 RX ADMIN — SODIUM CHLORIDE, PRESERVATIVE FREE 10 ML: 5 INJECTION INTRAVENOUS at 09:19

## 2022-08-05 RX ADMIN — NYSTATIN 500000 UNITS: 100000 SUSPENSION ORAL at 16:14

## 2022-08-05 RX ADMIN — MOMETASONE FUROATE AND FORMOTEROL FUMARATE DIHYDRATE 2 PUFF: 200; 5 AEROSOL RESPIRATORY (INHALATION) at 09:03

## 2022-08-05 RX ADMIN — NYSTATIN 500000 UNITS: 100000 SUSPENSION ORAL at 12:30

## 2022-08-05 RX ADMIN — TRAZODONE HYDROCHLORIDE 100 MG: 100 TABLET ORAL at 21:13

## 2022-08-05 RX ADMIN — IPRATROPIUM BROMIDE AND ALBUTEROL SULFATE 1 AMPULE: .5; 3 SOLUTION RESPIRATORY (INHALATION) at 09:03

## 2022-08-05 RX ADMIN — GABAPENTIN 600 MG: 300 CAPSULE ORAL at 19:55

## 2022-08-05 RX ADMIN — TOBRAMYCIN 300 MG: 300 SOLUTION RESPIRATORY (INHALATION) at 19:56

## 2022-08-05 RX ADMIN — NYSTATIN 500000 UNITS: 100000 SUSPENSION ORAL at 19:55

## 2022-08-05 RX ADMIN — GABAPENTIN 600 MG: 300 CAPSULE ORAL at 09:18

## 2022-08-05 RX ADMIN — DULOXETINE HYDROCHLORIDE 60 MG: 60 CAPSULE, DELAYED RELEASE ORAL at 19:55

## 2022-08-05 RX ADMIN — SODIUM CHLORIDE, PRESERVATIVE FREE 10 ML: 5 INJECTION INTRAVENOUS at 19:56

## 2022-08-05 RX ADMIN — MEROPENEM 1000 MG: 1 INJECTION, POWDER, FOR SOLUTION INTRAVENOUS at 06:09

## 2022-08-05 RX ADMIN — IPRATROPIUM BROMIDE AND ALBUTEROL SULFATE 1 AMPULE: .5; 3 SOLUTION RESPIRATORY (INHALATION) at 15:53

## 2022-08-05 RX ADMIN — Medication 5 MCG/MIN: at 21:36

## 2022-08-05 RX ADMIN — IPRATROPIUM BROMIDE AND ALBUTEROL SULFATE 1 AMPULE: .5; 3 SOLUTION RESPIRATORY (INHALATION) at 19:55

## 2022-08-05 RX ADMIN — IPRATROPIUM BROMIDE AND ALBUTEROL SULFATE 1 AMPULE: .5; 3 SOLUTION RESPIRATORY (INHALATION) at 11:40

## 2022-08-05 RX ADMIN — MEROPENEM 1000 MG: 1 INJECTION, POWDER, FOR SOLUTION INTRAVENOUS at 13:29

## 2022-08-05 RX ADMIN — TIZANIDINE 4 MG: 4 TABLET ORAL at 19:55

## 2022-08-05 RX ADMIN — TOBRAMYCIN 300 MG: 300 SOLUTION RESPIRATORY (INHALATION) at 09:18

## 2022-08-05 RX ADMIN — DULOXETINE HYDROCHLORIDE 60 MG: 60 CAPSULE, DELAYED RELEASE ORAL at 09:19

## 2022-08-05 RX ADMIN — NYSTATIN 500000 UNITS: 100000 SUSPENSION ORAL at 09:18

## 2022-08-05 RX ADMIN — MOMETASONE FUROATE AND FORMOTEROL FUMARATE DIHYDRATE 2 PUFF: 200; 5 AEROSOL RESPIRATORY (INHALATION) at 19:55

## 2022-08-05 RX ADMIN — ATORVASTATIN CALCIUM 40 MG: 40 TABLET, FILM COATED ORAL at 19:55

## 2022-08-05 RX ADMIN — MEROPENEM 1000 MG: 1 INJECTION, POWDER, FOR SOLUTION INTRAVENOUS at 21:14

## 2022-08-05 ASSESSMENT — PAIN SCALES - WONG BAKER
WONGBAKER_NUMERICALRESPONSE: 0

## 2022-08-05 ASSESSMENT — PAIN SCALES - GENERAL
PAINLEVEL_OUTOF10: 0

## 2022-08-05 NOTE — PROGRESS NOTES
This RN walked into pt room to find pt had removed one of the Rhino Rockets in her right nare. Pt bleeding from her nose at this time. Pt spO2 remaining above 95% and pt protecting her airway at this time.

## 2022-08-05 NOTE — PROGRESS NOTES
Infectious Disease Follow up Notes  Admit Date: 7/18/2022  Hospital Day: 19    Antibiotics :   IV Meropenem  Inhaled Tobramycin     CHIEF COMPLAINT:      Rt UL lung abscess  Severe Necrotizing PNA  Pseudomonas PNA  Hypoxic resp failure  Epistaxis       Subjective interval History :  78 y. o.woman with a past medical history of COPD, depression, chronic hypoxic respiratory failure now on   4 L of oxygen via nasal cannula, diabetes recent admission for COPD exacerbation and pneumonia diagnosed to have Pseudomonas based on sputum studies. She was on IV cefepime transition to oral levofloxacin on discharge. She was sent to [address] rehab facility and now admitted because of worsening shortness of breath increased sputum production. Sputum culture again on this admission positive for Pseudomonas with some resistance pattern, it is now become resistant to quinolones. CT chest on this admission with progressive pneumonia right upper lobe cavitary lesion possible lung abscess. Extensive consolidation  see images -  This is a new finding on the CT chest compared to her previous CT chest on 6/20/22. Given the necrotizing pneumonia with Pseudomonas infection and lung abscess formation we are consulted for recommendations. Interval History : Remains in ICU using Venturi mask for oxygenation. Ongoing cough sputum seems to be clearing up. Noticed some epistaxis from the right nostril she has a [employer] in place, tolerating antibiotic therapy okay    Past Medical History:    Past Medical History:   Diagnosis Date    CAD (coronary artery disease) 07/19/2022    NSTEMI, PCI LCx    Chronic pain syndrome     Percocet.  Dr. Garrett Red    Colorectal polyps     COPD (chronic obstructive pulmonary disease) ([facility]     CTS (carpal tunnel syndrome)     BILATERAL    DDD (degenerative disc disease), lumbar     Depression     Family hx of colon cancer Fibromyalgia     Hyperlipemia     IBS (irritable bowel syndrome)     Lumbar spondylosis     New onset type 2 diabetes mellitus ([facility] 02/03/2020    On home O2     4L    Pneumonia 07/2022    P.aer    Urticaria, chronic        Past Surgical History:    Past Surgical History:   Procedure Laterality Date    BRONCHOSCOPY N/A 8/3/2022    BRONCHOSCOPY performed by Kath Landry DO at [facility] Bilateral     CATARACT EXTRACTION      CERVICAL POLYP REMOVAL  09/2004    CORONARY ANGIOPLASTY WITH STENT PLACEMENT  07/19/2022    LCx 99. PCI/stent. INTRACAPSULAR CATARACT EXTRACTION Left 06/23/2020    Phacoemulsification with intraocular lens implant performed by Yovany Jackson MD at [address] Right 07/14/2020    Phacoemulsification with intraocular lens implant performed by Yovany Jackson MD at [address]      patient denies        Current Medications:    Outpatient Medications Marked as Taking for the 7/18/22 encounter [facility] Encounter)   Medication Sig Dispense Refill    aspirin 81 MG EC tablet Take 1 tablet by mouth in the morning. 30 tablet 3    prasugrel (EFFIENT) 10 MG TABS Take 1 tablet by mouth in the morning. 30 tablet 1       Allergies:  Patient has no known allergies.     Immunizations :   Immunization History   Administered Date(s) Administered    COVID-19, MODERNA BLUE border, Primary or Immunocompromised, (age 12y+), IM, 100 mcg/0.5mL 03/08/2021, 04/05/2021    COVID-19, MODERNA Booster BLUE border, (age 18y+), IM, 50mcg/0.25mL 12/01/2021    Influenza Vaccine, unspecified formulation 01/10/2017    Influenza, High Dose (Fluzone 65 yrs and older) 11/04/2013, 01/21/2016, 09/01/2017, 10/30/2018, 09/18/2020    Influenza, Quadv, adjuvanted, 65 yrs +, IM, PF (Fluad) 09/27/2021    Influenza, Triv, inactivated, subunit, adjuvanted, IM (Fluad 65 yrs and older) 09/13/2019 Pneumococcal Conjugate 13-valent ([id number]) 01/19/2015    Pneumococcal Conjugate Vaccine 01/10/2017    Pneumococcal Polysaccharide ([id number]) 10/12/2012    Tdap (Boostrix, Adacel) 07/17/2007    Zoster Recombinant (Shingrix) 11/21/2019       Social History:     Social History     Tobacco Use    Smoking status: Every Day     Packs/day: 1.00     Years: 55.00     Pack years: 55.00     Types: Cigarettes     Start date: 1/1/1962    Smokeless tobacco: Never    Tobacco comments:     almost ready to quit   Vaping Use    Vaping Use: Never used   Substance Use Topics    Alcohol use: No     Alcohol/week: 0.0 standard drinks    Drug use: No     Social History     Tobacco Use   Smoking Status Every Day    Packs/day: 1.00    Years: 55.00    Pack years: 55.00    Types: Cigarettes    Start date: 1/1/1962   Smokeless Tobacco Never   Tobacco Comments    almost ready to quit      Family History   Problem Relation Age of Onset    Cancer Father         COLON     Alzheimer's Disease Mother     Heart Disease Brother     Heart Failure Brother     Diabetes Daughter     Diabetes Daughter     Diabetes Daughter           REVIEW OF SYSTEMS:      Not possible due to mentation              PHYSICAL EXAM:      Vitals:    BP (!) 98/57   Pulse (!) 101   Temp 97.6 °F (36.4 °C) (Axillary)   Resp 23   Ht 5' 4\" (1.626 m)   Wt 132 lb 7.9 oz (60.1 kg)   SpO2 (!) 78%   BMI 22.74 kg/m²     General Appearance: awake and in some  acute distress, ++  pallor,Epistaxis Rhino rocket in place Venturi mask+   6lts   Skin: warm and dry, no rash or erythema  Head: normocephalic and atraumatic  Eyes: pupils equal, round, and reactive to light, conjunctivae normal  ENT: tympanic membrane, external ear and ear canal normal bilaterally, nose without deformity, nasal mucosa and turbinates normal without polyps  Neck: supple and non-tender without mass, no thyromegaly  no cervical lymphadenopathy  Pulmonary/Chest:  Rt UL coarse crepts+ +  wheezes, rales or rhonchi, normal air movement, in some respiratory distress  Cardiovascular: normal rate, regular rhythm, normal S1 and S2, no murmurs, rubs, clicks, or gallops, no carotid bruits  Abdomen: soft, non-tender, non-distended, normal bowel sounds, no masses or organomegaly  Extremities: no cyanosis, clubbing or edema  Musculoskeletal: normal range of motion, no joint swelling, deformity or tenderness  Integumentary: No rashes, no abnormal skin lesions, no petechiae  Neurologic: reflexes normal and symmetric, no cranial nerve deficit           Lines: PICC     Data Review:    CBC:   Lab Results   Component Value Date    WBC 8.0 08/05/2022    HGB 7.4 (L) 08/05/2022    HCT 22.1 (L) 08/05/2022    MCV 90.5 08/05/2022     (H) 08/05/2022     RENAL:   Lab Results   Component Value Date    CREATININE <0.5 (L) 08/05/2022    BUN 12 08/05/2022     (L) 08/05/2022    K 3.9 08/05/2022    CL 97 (L) 08/05/2022    CO2 30 08/05/2022     SED RATE: No results found for: SEDRATE  CK: No results found for: CKTOTAL  CRP: No results found for: CRP  Hepatic Function Panel:   Lab Results   Component Value Date/Time    ALKPHOS 88 07/18/2022 05:20 PM    ALT 65 07/18/2022 05:20 PM    AST 61 07/18/2022 05:20 PM    PROT 5.6 07/18/2022 05:20 PM    PROT 6.9 10/12/2012 11:00 AM    BILITOT 0.5 07/18/2022 05:20 PM    LABALBU 2.4 07/18/2022 05:20 PM     UA:  Lab Results   Component Value Date/Time    COLORU Yellow 07/22/2022 06:31 AM    CLARITYU Clear 07/22/2022 06:31 AM    GLUCOSEU Negative 07/22/2022 06:31 AM    BILIRUBINUR Negative 07/22/2022 06:31 AM    BILIRUBINUR neg 10/17/2019 11:50 AM    KETUA TRACE 07/22/2022 06:31 AM    SPECGRAV 1.049 07/22/2022 06:31 AM    BLOODU Negative 07/22/2022 06:31 AM    PHUR 6.5 07/22/2022 06:31 AM    PROTEINU 30 07/22/2022 06:31 AM    UROBILINOGEN 0.2 07/22/2022 06:31 AM    NITRU Negative 07/22/2022 06:31 AM    LEUKOCYTESUR Negative 07/22/2022 06:31 AM    LABMICR YES 07/22/2022 06:31 AM    URINETYPE NotGiven 07/22/2022 06:31 AM      Urine Microscopic:   Lab Results   Component Value Date/Time    LABCAST 10-20 Hyaline 01/10/2017 06:19 PM    BACTERIA None Seen 07/22/2022 06:31 AM    COMU see below 07/03/2022 03:11 PM    HYALCAST 2 07/22/2022 06:31 AM    WBCUA 2 07/22/2022 06:31 AM    RBCUA 5 07/22/2022 06:31 AM    EPIU 1 07/22/2022 06:31 AM     Urine Reflex to Culture:   Lab Results   Component Value Date/Time    URRFLXCULT Not Indicated 07/03/2022 03:11 PM         MICRO: cultures reviewed and updated by me   Blood Culture:          Culture, Respiratory [[id number]] (Abnormal)  Collected: 07/22/22 1200   Order Status: Completed Specimen: Sputum Expectorated Updated: 07/24/22 0801    CULTURE, RESPIRATORY Rare growth normal respiratory fabiana with Abnormal     Gram Stain Result No Epithelial Cells seen   3+ WBC's (Polymorphonuclear)   No organisms seen     Organism Pseudomonas aeruginosa Abnormal     CULTURE, RESPIRATORY Light growth   Narrative:     ORDER#: [id number]                          ORDERED BY: CIRA KEBEDE   SOURCE: Sputum Expectorated                COLLECTED:  07/22/22 12:00   ANTIBIOTICS AT GURWINDER.:                      RECEIVED :  07/22/22 12:22   Respiratory Culture [[id number]] (Abnormal)  Collected: 07/19/22 2030   Order Status: Completed Specimen: Sputum Expectorated Updated: 07/23/22 0748    CULTURE, RESPIRATORY Light growth normal respiratory fabinaa with Abnormal     Gram Stain Result 2+ Gram positive cocci   2+ WBC's (Polymorphonuclear)   1+ Epithelial Cells     Organism Pseudomonas aeruginosa Abnormal     CULTURE, RESPIRATORY Light growth   Narrative:     ORDER#: [id number]                          ORDERED BY: FARZANA GAN   SOURCE: Sputum Expectorated                COLLECTED:  07/19/22 20:30   ANTIBIOTICS AT GURWINDER.:                      RECEIVED :  07/19/22 21:19   Culture, Blood 1 [[id number]] Collected: 07/18/22 1841   Order Status: Completed Specimen: Blood Updated: 07/22/22 2015    Blood Culture, Routine No Growth after 4 days of incubation. Narrative:     ORDER#: [id number]                          ORDERED BY: [employer]   SOURCE: Blood                              COLLECTED:  07/18/22 18:41   ANTIBIOTICS AT GURWINDER.:                      RECEIVED :  07/18/22 18:56   If child <=2 yrs old please draw pediatric bottle. ~Blood Culture 1   Culture, Blood 2 [[id number]] Collected: 07/18/22 1850   Order Status: Completed Specimen: Blood Updated: 07/22/22 2015    Culture, Blood 2 No Growth after 4 days of incubation. Narrative:     ORDER#: [id number]                          ORDERED BY: [employer]   SOURCE: Blood                              COLLECTED:  07/18/22 18:50   ANTIBIOTICS AT GURWINDER.:                      RECEIVED :  07/18/22 18:56   If child <=2 yrs old please draw pediatric bottle. ~Blood Culture #2   Strep Pneumoniae Antigen [[id number]] Collected: 07/20/22 1020   Order Status: Completed Specimen: Urine, clean catch Updated: 07/21/22 0841    STREP PNEUMONIAE ANTIGEN, URINE --    Presumptive Negative   Presumptive negative suggests no current or recent   pneumococcal infection. Infection due to Strep pneumoniae   cannot be ruled out since the antigen present in the sample   may be below the detection limit of the test.   Normal Range:Presumptive Negative    Narrative:     ORDER#: [id number]                          ORDERED BY: FARZANA GAN   SOURCE: Urine Clean Catch                  COLLECTED:  07/20/22 10:20   ANTIBIOTICS AT GURWINDER.:                      RECEIVED :  07/20/22 10:27   Legionella antigen, urine [[id number]] Collected: 07/20/22 1020   Order Status: Completed Specimen: Urine, catheter Updated: 07/21/22 0840    L. pneumophila Serogp 1 Ur Ag --    Presumptive Negative   No Legionella pneumophila serogroup 1 antigens detected.    A negative result does not exclude infection with   Legionella pneumophila serogroup 1 nor does it rule out   other microbial-caused respiratory infections or disease caused by other serogroups of   Legionella pneumophila. Normal Range: Presumptive Negative    Narrative:     ORDER#: [id number]                          ORDERED BY: FARZANA GAN   SOURCE: Urine Catheter                     COLLECTED:  07/20/22 10:20   ANTIBIOTICS AT GURWINDER.:                      RECEIVED :  07/20/22 10:28   Sputum gram stain [[id number]] Collected: 07/19/22 2030   Order Status: Canceled Specimen: Sputum Expectorated    Rapid influenza A/B antigens [[id number]] Collected: 07/19/22 0210   Order Status: Completed Specimen: Nares Updated: 07/19/22 0245    Rapid Influenza A Ag Negative    Rapid Influenza B Ag Negative   COVID-19, Rapid [[id number]] Collected: 07/18/22 1825   Order Status: Completed Specimen: Nasopharyngeal Swab Updated: 07/18/22 1854    SARS-CoV-2, NAAT Not Detected    Comment: Rapid NAAT:   Negative results should be treated as presumptive and,   if inconsistent with clinical signs and symptoms or necessary for   patient management, should be tested with an alternative molecular   assay. Negative results do not preclude SARS-CoV-2 infection and   should not be used as the sole basis for patient management decisions. This test has been authorized by the FDA under an Emergency Use   Authorization (EUA) for use by authorized laboratories.      Fact sheet for Healthcare Providers:   BuildHer.es   Fact sheet for Patients: BuildHer.es     METHODOLOGY: Isothermal Nucleic Acid Amplification         CULTURE, RESPIRATORY Rare growth normal respiratory fabiana with Abnormal     Gram Stain Result No Epithelial Cells seen   3+ WBC's (Polymorphonuclear)   No organisms seen    Organism Pseudomonas aeruginosa Abnormal     CULTURE, RESPIRATORY Light growth    Resulting Agency [address] [facility]          Susceptibility      Pseudomonas aeruginosa (1)    Antibiotic Interpretation Microscan  Method Status    cefepime Sensitive 8 mcg/mL BACTERIAL SUSCEPTIBILITY PANEL BY TIERRA     ciprofloxacin Resistant >2 mcg/mL BACTERIAL SUSCEPTIBILITY PANEL BY TIERRA     gentamicin Sensitive <=4 mcg/mL BACTERIAL SUSCEPTIBILITY PANEL BY TIERRA     meropenem Sensitive <=1 mcg/mL BACTERIAL SUSCEPTIBILITY PANEL BY TIERRA     piperacillin-tazobactam Sensitive <=16 mcg/mL BACTERIAL SUSCEPTIBILITY PANEL BY TIERRA     tobramycin Sensitive <=4 mcg/mL BACTERIAL SUSCEPTIBILITY PANEL BY TIERRA          Narrative  Performed by: [facility]  ORDER#: [id number]                          ORDERED BY: CIRA KEBEDE   SOURCE: Sputum Expectorated                COLLECTED:  07/22/22 12:00   ANTIBIOTICS AT GURWINDER.:                      RECEIVED :  07/22/22 12:22           Lab Results   Component Value Date/Time    BC No Growth after 4 days of incubation. 07/18/2022 06:41 PM    BLOODCULT2 No Growth after 4 days of incubation. 07/18/2022 06:50 PM       Respiratory Culture:  Lab Results   Component Value Date/Time    CULTRESP Normal respiratory fabiana 07/30/2022 04:00 PM    LABGRAM  07/30/2022 04:00 PM     3+ WBC's (Mononuclear)  1+ Epithelial Cells  1+ Gram positive cocci       AFB:No results found for: AFBSMEAR  Viral Culture:  Lab Results   Component Value Date/Time    COVID19 Not Detected 08/01/2022 01:55 PM     Urine Culture: No results for input(s): LABURIN in the last 72 hours. IMAGING:    XR CHEST PORTABLE   Final Result   Endotracheal tube with the tip approximately 1 cm above the song. Consider   retraction by approximately 3.5 cm. Enteric tube with the tip and the side   hole below the diaphragm overlying the left upper abdomen, likely within the   fundus of the stomach. Redemonstration of multifocal pneumonia affecting the right lung. Small   right pleural effusion. XR CHEST PORTABLE   Final Result   Unchanged multifocal right-sided pneumonia.          XR CHEST PORTABLE   Final Result   Stable multifocal airspace disease in the right lung with probable upper perflutren lipid microspheres, traZODone, ipratropium-albuterol, cetirizine, albuterol sulfate HFA, glucose, dextrose bolus **OR** dextrose bolus, glucagon (rDNA), dextrose, ondansetron, polyethylene glycol      Assessment:     Patient Active Problem List   Diagnosis    Osteopenia-last dexa 2009 repeat 2015 (-2.4 hip)--advised tx    Colon polyp, hyperplastic,-(done 3/11-repeat 3-5 yrs--dr Abdelrahman Al)    Family history of colon cancer    CTS (carpal tunnel syndrome)BILATERAL-s/p surgical repair--resolved     Postmenopausal status-last mammogram 2/15 wnl last pap 12/13--wnl    Nondependent alcohol abuse, in remission    Urticaria, chronic    Smoking    Chronic back pain-(djd) saw dr Maureen Elmore afford surgery--seeing dr Laura Pena for pain meds    Fibromyalgia    Hypercholesteremia    Lumbar spondylosis    Vitamin D deficiency--severe--started 50,000 iu 2x/ wk 11/13    Insomnia--on elevil w help    Constipation--on daily miralax    Depression    Chronic pain syndrome    Muscle cramping    Acute on chronic respiratory failure with hypercapnia (HCC)    ROLY (acute kidney injury) ([address]    Severe sepsis (HCC)    Gastroesophageal reflux disease    COPD, severe ([address]    Chronic hypoxemic respiratory failure ([address]    Degeneration of lumbar or lumbosacral intervertebral disc    Trochanteric bursitis of right hip    COPD exacerbation ([address]    Diabetes ([address]    Controlled type 2 diabetes mellitus without complication, without long-term current use of insulin ([address]    Age-related osteoporosis without current pathological fracture- started alendronate 9/2021    Pulmonary nodule seen on imaging study    Pneumonia of right lung due to infectious organism    General weakness    Elevated lactic acid level    Community acquired pneumonia of right lung    Chronic obstructive pulmonary disease ([address]    Acute respiratory failure with hypoxia (HCC)    Acute on chronic respiratory failure with hypoxia and hypercapnia (HCC)    Abnormal EKG NSTEMI (non-ST elevated myocardial infarction) ([address]    Necrotizing pneumonia (HCC)    Abscess of upper lobe of right lung with pneumonia ([address]    Pseudomonas respiratory infection    Abnormal CT of the chest    Peripherally inserted central catheter (PICC) in place    Massive hemoptysis    Epistaxis    Acute respiratory insufficiency    Acute blood loss anemia    Hypotension due to drugs     Severe necrotizing Pseudomonas pneumonia  Right upper lobe cavitary lesion  Right right lung evolving abscess  Right lung dense consolidation of  Pseudomonas pneumonia developing some resistance  COPD exacerbation  Hypoxic respiratory failure  Coronary artery disease  Status post cardiac cath  Chronic smoking  Abnormal CT chest  BNP elevation  COVID-19 negative  Epistaxis severe vs Hemoptysis now intubated sedated on the ventilator for t airway protection 8/3/22       Unfortunately she developed progressive changes with dense consolidation and lung abscess secondary to Pseudomonas pneumonia. Pseudomonas appears to have developed resistance to quinolones while on therapy this is concerning. CT chest on this admission grossly abnormal and definitely there is progressive changes given the severity of the -pneumonia will need IV antibiotics     OPAT d/w pt she needs to QUIT smoking d/w pt and her family      unfortunately still has Severe hypoxia and respiratory distress required transfer to ICU now on BiPAP. Heart rate is elevated secondary to respiratory distress-. Not able to come off BiPAP for extended period of time transferred out to progressive care unit.     Given the lung findings will have to continue antibiotic therapy anticipate least 4 more weeks of duration of treatment     repeat sputum testing to see if Pseudomonas is clearing out on the current therapy-sputum repeat Normal fabiana noted      Now transferred to ICU secondary to massive epistaxis versus was  intubated on the ventilator status post bronchoscopy      Extubated on 8/4/22 and on Venturi mask has Rhinorocket in place ENT notes reviewed      Still has mild epistaxis noted better controlled ENT is following closely      Labs, Microbiology, Radiology and all the pertinent results from current hospitalization and  care every where were reviewed  by me as a part of the evaluation   Plan:   Cont  IV Meropenem 1 gm q 8 HR X stop date  x  8/27  2. Can cont   Tobramycin Neb   3 . Picc line in place   4. OPAT d/w pt  5 QUIT smoking  6. Will repeat CT chest in  3 weeks orders in place   7 Cont supportive care  8CT images reviewed see above    9. Risk for progression  10 Extubated to Venturi mask  11. Repeat sputum clearing up  12. Robby DONE   13, ENT following for massive Epistaxis - controlled with Rhino rocket  off blood thinners until then           Discussed with patient/Family and Nursing   Risk of Complications/Morbidity: High      Illness(es)/ Infection present that pose threat to bodily function. There is potential for severe exacerbation of infection/side effects of treatment. Therapy requires intensive monitoring for antimicrobial agent toxicity. Thanks for allowing me to participate in your patient's care and please call me with any questions or concerns.     Emerson Amezquita MD  Infectious Disease  [facility] ([facility]) Physician  Phone: [phone]   Fax : [phone]

## 2022-08-05 NOTE — PROGRESS NOTES
Pulmonary Progress Note    CC:  Follow up epistaxis, pneumonia, ventilator    Subjective:  Extubated yesterday  ON VM due to rhinorocket in nare  Afebrile  Blood pressure up and down   No issues with breathing    ROS  No real SOB  Uncomfortable with nasal packing       Intake/Output Summary (Last 24 hours) at 8/5/2022 0857  Last data filed at 8/5/2022 0658  Gross per 24 hour   Intake 630.76 ml   Output 2310 ml   Net -1679.24 ml         PHYSICAL EXAM:  Blood pressure 107/69, pulse 96, temperature 98.2 °F (36.8 °C), temperature source Axillary, resp. rate 20, height 5' 4\" (1.626 m), weight 132 lb 7.9 oz (60.1 kg), SpO2 99 %, not currently breastfeeding.'  Gen: Chronically ill  Eyes: PERRL. No sclera icterus. No conjunctival injection. ENT: No discharge. Pharynx with NRB mask  RIght nare with rhino rocket with gauze over nares as well   Neck: Trachea midline. No obvious mass. Resp: Right lung rhonchi/crackles, left lung clear   CV: Regular rate. Regular rhythm. No murmur or rub. GI: Non-tender. Non-distended. No hernia. Skin: Warm, dry, normal texture and turgor. No nodule on exposed extremities. Lymph: No cervical LAD. No supraclavicular LAD. M/S: No cyanosis. No clubbing. No joint deformity. Neuro:  More awake and alert   Ext:   no edema    Medications:    Scheduled Meds:   calcium gluconate-NaCl  1,000 mg IntraVENous Once    ipratropium-albuterol  1 ampule Inhalation Q4H    fluconazole  100 mg Oral Daily    [Held by provider] enoxaparin  40 mg SubCUTAneous Daily    meropenem  1,000 mg IntraVENous Q8H    tobramycin (PF)  300 mg Nebulization BID    [Held by provider] aspirin  81 mg Oral Daily    nystatin  5 mL Oral 4x Daily    [Held by provider] prasugrel  10 mg Oral Daily    sodium chloride flush  5-40 mL IntraVENous 2 times per day    atorvastatin  40 mg Oral Nightly    tiZANidine  4 mg Oral Nightly    gabapentin  600 mg Oral BID    DULoxetine  60 mg Oral BID    mometasone-formoterol  2 puff Inhalation BID       Continuous Infusions:   sodium chloride 5 mL/hr at 22 0502    dextrose         PRN Meds:  sodium chloride, sodium chloride flush, sodium chloride, acetaminophen, perflutren lipid microspheres, traZODone, ipratropium-albuterol, cetirizine, albuterol sulfate HFA, glucose, dextrose bolus **OR** dextrose bolus, glucagon (rDNA), dextrose, ondansetron, polyethylene glycol    Labs:  CBC:   Recent Labs     22  0540 22  0014 22  0529 22  0408   WBC 6.6  --  9.4 8.0   HGB 7.7* 6.4* 7.9* 7.4*   HCT 22.9* 19.2* 23.5* 22.1*   MCV 90.7  --  91.0 90.5   *  --  526* 476*     BMP:   Recent Labs     22  0540 22  0408    134* 133*   K 4.1 4.3 3.9    101 97*   CO2 29 25 30   PHOS  --  3.2 2.7   BUN 10 12 12   CREATININE <0.5* <0.5* <0.5*     LIVER PROFILE: No results for input(s): AST, ALT, LIPASE, BILIDIR, BILITOT, ALKPHOS in the last 72 hours. Invalid input(s): AMYLASE,  ALB  PT/INR: No results for input(s): PROTIME, INR in the last 72 hours. APTT: No results for input(s): APTT in the last 72 hours. UA:No results for input(s): NITRITE, COLORU, PHUR, LABCAST, WBCUA, RBCUA, MUCUS, TRICHOMONAS, YEAST, BACTERIA, CLARITYU, SPECGRAV, LEUKOCYTESUR, UROBILINOGEN, BILIRUBINUR, BLOODU, GLUCOSEU, AMORPHOUS in the last 72 hours. Invalid input(s): [place] Reamer  No results for input(s): PH, PCO2, PO2 in the last 72 hours. Films:  Chest imaging reports were reviewed and imaging was reviewed by me and showed no new films     AB    Cultures:  Sputum:  pseudomonas, culture on  was negative     I reviewed the labs and images listed above    Assessment/Plan:   Acute Respiratory Insufficiency due to epistaxis/airway compromise, extubated on   Titrate oxygen for saturations greater than or equal to 90%  Massive Epistaxis s/p Rhinorocket  ENT following. Rhinorocket in place.  Defer to ENT when to remove it  Acute Blood Loss Anemia s/p two units of blood   Goal Hb is >7  No chemical prophylaxis   Hypotension related to sedatives   Monitor.   BP is still on low side but no symptoms   Necrotizing Pneumonia due to pseudomonas   Merrem, Arnie per ID  Severe COPD  Add duonebs q 4 hours for pulmonary toilet  Dulera q 12       DVT prophylaxis  SCD    Speech evaluation before starting diet    PT/OT    Ryder Porter, DO  [facility]

## 2022-08-05 NOTE — PROGRESS NOTES
Physical Therapy  Facility/Department: [address] ICU  Physical Therapy Re-Evaluation    Name: Yanique Meyer  : 1945  MRN: [id number]  Date of Service: 2022    Discharge Recommendations:  Continue to assess pending progress, Subacute/Skilled Nursing Facility   PT Equipment Recommendations  Other: Defer to next level of care. Yanique Meyer scored a 10/24 on the AM-PAC short mobility form. Current research shows that an AM-PAC score of 17 or less is typically not associated with a discharge to the patient's home setting. Based on the patient's AM-PAC score and their current functional mobility deficits, it is recommended that the patient have 3-5 sessions per week of Physical Therapy at d/c to increase the patient's independence. Please see assessment section for further patient specific details. If patient discharges prior to next session this note will serve as a discharge summary. Please see below for the latest assessment towards goals. Assessment   Body Structures, Functions, Activity Limitations Requiring Skilled Therapeutic Intervention: Decreased functional mobility ; Decreased strength;Decreased endurance  Assessment: 79 y/o female admit 2022 with Pneumonia, Elevated Troponin. 2022 Cardiac Cath : NSTEMI.  8/3/2022 Pt transfer to ICU with worsening Respiratory Concerns. -2022 Pt Intubated. Recent hospital admit 7/3-2022 (admit from home) and d/c to SNF setting. PMH as noted including COPD, Lumbar Spondlyosis, Fibromyalgia. Pt admit from SNF setting; prior pt living with sign other in apt setting. Pt currenlty requiring O2 via NRB. Pt rell brief eob with sats remain at least 90%. Fatigued following. Defer oob per nursing request.  At this time, anticipate return to SNF setting upon d/c. Therapy Prognosis: Fair  Decision Making: Medium Complexity  History: 79 y/o female admit 2022 with Pneumonia, Elevated Troponin.   2022 Cardiac Cath : NSTEMI.  8/3/2022 Pt transfer to ICU with worsening Respiratory Concerns. 8/l3-8/4/2022 Pt Intubated. Recent hospital admit 7/3-7/13/2022 (admit from home) and d/c to SNF setting. PMH as noted including COPD, Lumbar Spondlyosis, Fibromyalgia. Exam: See above. Clinical Presentation: See above. Barriers to Learning: Endurance. Requires PT Follow-Up: Yes  Activity Tolerance  Activity Tolerance: Patient limited by endurance  Activity Tolerance Comments: Pt currenlty requiring O2 via NRB. Pt rell brief eob with sats remain at least 90%. Fatigued following. Defer oob per nursing request.     Plan   Plan  Plan: 3-5 times per week  Current Treatment Recommendations: Strengthening, Functional mobility training, Transfer training, Gait training, Endurance training, Safety education & training, Patient/Caregiver education & training  Safety Devices  Type of Devices: Bed alarm in place, Call light within reach, Left in bed, Nurse notified     Restrictions  Restrictions/Precautions  Restrictions/Precautions: Fall Risk  Position Activity Restriction  Other position/activity restrictions: O2 via NRB.   NPO. Subjective   General  Chart Reviewed: Yes  Patient assessed for rehabilitation services?: Yes  Additional Pertinent Hx: 79 y/o female admit 7/18/2022 with Pneumonia, Elevated Troponin. 7/19/2022 Cardiac Cath : NSTEMI.  8/3/2022 Pt transfer to ICU with worsening Respiratory Concerns. 8/l3-8/4/2022 Pt Intubated. Recent hospital admit 7/3-7/13/2022 (admit from home) and d/c to SNF setting. PMH as noted including COPD, Lumbar Spondlyosis, Fibromyalgia. Family / Caregiver Present: Yes (Sign other ([facility], [place] Dtr.)  Referring Practitioner: Tobias Costa NP. Other (Comment): Pt follows simple commands. Subjective  Subjective: Pt/family agreeable to PT Eval/Rx.          Social/Functional History  Social/Functional History  Lives With: Significant other (Ray)  Type of Home: Apartment (1st floor.)  Home Layout: One level (laundry in apt)  Home Access: Level entry  Bathroom Shower/Tub: Tub/Shower unit  Bathroom Toilet: Standard  Bathroom Equipment: Shower chair  Bathroom Accessibility: Accessible  Home Equipment: [address]  ADL Assistance: Independent  Homemaking Assistance: Independent (Shared with sign other.)  Ambulation Assistance: Independent (Without assist device.)  Transfer Assistance: Independent  Active : No (Sign other drives.)  Patient's  Info: significant other drives  Occupation: Retired  Additional Comments: Pt recent admit 7/3/2022 with COPD Exac, ROLY, UTI and Sepsis and d/c'd on 7/13/2022 to Home at [facility] SNF setting. Vision/Hearing  Vision  Vision: Impaired  Vision Exceptions: Wears glasses for reading  Hearing  Hearing: Within functional limits    Cognition   Orientation  Overall Orientation Status: Within Functional Limits  Cognition  Overall Cognitive Status: WFL     Objective           Gross Assessment  AROM: Within functional limits  Strength:  (Grossly 3 3+/5.)  Sensation: Impaired (Numbness B Feet.)                    Bed mobility  Supine to Sit: Moderate assistance (HOB elevated. Use of Bedrail.)  Sit to Supine: Moderate assistance  Transfers  Bed to Chair: Unable to assess  Comment: Defer oob at this time per nursing request.        Balance  Comments: Pt remained at EOB ~ 10 min with Min to CGA. No c/o dizziness/lighthead. Sats remain at least 90%. BP 96/58 (68). A/AROM Exercises: Glut Sets, Quad Sets, Heel Slides, Ankle Pumps 10x B LEs. AM-PAC Score  AM-PAC Inpatient Mobility Raw Score : 10 (08/05/22 1439)  AM-PAC Inpatient T-Scale Score : 32.29 (08/05/22 1439)  Mobility Inpatient CMS 0-100% Score: 76.75 (08/05/22 1439)  Mobility Inpatient CMS G-Code Modifier : CL (08/05/22 1439)            Goals  Short Term Goals  Time Frame for Short term goals: Upon d/c acute care setting. Short term goal 1: Bed Mob SBA. Short term goal 2: Transfers with assist device CGA.   Short term goal 3: Amb with assist device 25' CGA. Short term goal 4: SpO2 levels to stay >90% with activity  Patient Goals   Patient goals : Get stronger and eventually return home. Education  Patient Education  Education Given To: Patient; Family  Education Provided Comments: Role of PT, POC, Need to call for assist.  Education Method: Demonstration  Education Outcome: Continued education needed      Therapy Time   Individual Concurrent Group Co-treatment   Time In [address]         Time Out 1320         Minutes [address] [facility] Guillermo, PT

## 2022-08-05 NOTE — CARE COORDINATION
Chart Reviewed. Spoke with Bryan Rivera, Palliative Care. Call placed to Dgtr, Destinee Rasmussen, to discuss DC plan. Pt had come from Home of [address]. Family is interested in her return. Spoke with Lon Cosme at [facility] who reports They can ONLY accept her there if her IV therapy is Q 12 hours; NOT 8 hours. She will need [employer]. Spoke with bedside RN who reports she continues with rhino rockets in nares and on a non rebreather today. Will also need new PT/OT evals for precert.   Nasim Robertson [place]     Case Management   [phone]    8/5/2022  11:08 AM

## 2022-08-05 NOTE — PROGRESS NOTES
Hospitalist Progress Note      PCP: HECTOR Srivastava - [facility]    Date of Admission: 7/18/2022    Chief Complaint: respiratory distress    Hospital Course:      Subjective:   Hemoptysis but on endoscopy turns out to have been epistaxis. Has rhinorocket in right nare  Short lived period on vent- now extubated. No F/C  Some SOB  Still on venturi mask  Feeling better        Medications:  Reviewed    Infusion Medications    norepinephrine      sodium chloride 5 mL/hr at 07/25/22 0502    dextrose       Scheduled Medications    ipratropium-albuterol  1 ampule Inhalation Q4H    fluconazole  100 mg Oral Daily    [Held by provider] enoxaparin  40 mg SubCUTAneous Daily    meropenem  1,000 mg IntraVENous Q8H    tobramycin (PF)  300 mg Nebulization BID    [Held by provider] aspirin  81 mg Oral Daily    nystatin  5 mL Oral 4x Daily    [Held by provider] prasugrel  10 mg Oral Daily    sodium chloride flush  5-40 mL IntraVENous 2 times per day    atorvastatin  40 mg Oral Nightly    tiZANidine  4 mg Oral Nightly    gabapentin  600 mg Oral BID    DULoxetine  60 mg Oral BID    mometasone-formoterol  2 puff Inhalation BID     PRN Meds: sodium chloride, sodium chloride flush, sodium chloride, acetaminophen, perflutren lipid microspheres, traZODone, ipratropium-albuterol, cetirizine, albuterol sulfate HFA, glucose, dextrose bolus **OR** dextrose bolus, glucagon (rDNA), dextrose, ondansetron, polyethylene glycol      Intake/Output Summary (Last 24 hours) at 8/5/2022 1447  Last data filed at 8/5/2022 1230  Gross per 24 hour   Intake 382.04 ml   Output 990 ml   Net -607.96 ml         Exam:    BP (!) 91/41   Pulse 95   Temp 97.6 °F (36.4 °C) (Axillary)   Resp 19   Ht 5' 4\" (1.626 m)   Wt 132 lb 7.9 oz (60.1 kg)   SpO2 100%   BMI 22.74 kg/m²     General appearance: Anxious+, appears stated age and cooperative. HEENT: Pupils equal, round, and reactive to light. Conjunctivae/corneas clear.   Neck: Supple, with full range of motion. No jugular venous distention. Trachea midline. Respiratory:  Comfortably on Bipap. Crackles bibasally. Cardiovascular: Regular rate and rhythm with normal S1/S2 without murmurs, rubs or gallops. Abdomen: Soft, non-tender, non-distended with normal bowel sounds. Musculoskeletal: No clubbing, cyanosis or edema bilaterally. Full range of motion without deformity. Skin: Skin color, texture, turgor normal.  No rashes or lesions. Neurologic:  Neurovascularly intact without any focal sensory/motor deficits. Cranial nerves: II-XII intact, grossly non-focal.  Psychiatric: Alert and oriented, thought content appropriate, normal insight  Capillary Refill: Brisk,< 3 seconds   Peripheral Pulses: +2 palpable, equal bilaterally       Labs:   Recent Labs     08/03/22 0540 08/04/22  0014 08/04/22  0529 08/05/22  0408   WBC 6.6  --  9.4 8.0   HGB 7.7* 6.4* 7.9* 7.4*   HCT 22.9* 19.2* 23.5* 22.1*   *  --  526* 476*       Recent Labs     08/03/22  0540 08/04/22  0529 08/05/22  0408    134* 133*   K 4.1 4.3 3.9    101 97*   CO2 29 25 30   BUN 10 12 12   CREATININE <0.5* <0.5* <0.5*   CALCIUM 8.3 7.6* 8.0*   PHOS  --  3.2 2.7       No results for input(s): AST, ALT, BILIDIR, BILITOT, ALKPHOS in the last 72 hours. No results for input(s): INR in the last 72 hours. No results for input(s): Jazmyn Comber in the last 72 hours. Urinalysis:      Lab Results   Component Value Date/Time    NITRU Negative 07/22/2022 06:31 AM    WBCUA 2 07/22/2022 06:31 AM    BACTERIA None Seen 07/22/2022 06:31 AM    RBCUA 5 07/22/2022 06:31 AM    BLOODU Negative 07/22/2022 06:31 AM    SPECGRAV 1.049 07/22/2022 06:31 AM    GLUCOSEU Negative 07/22/2022 06:31 AM       Radiology:  XR CHEST PORTABLE   Final Result   Endotracheal tube with the tip approximately 1 cm above the song. Consider   retraction by approximately 3.5 cm.   Enteric tube with the tip and the side   hole below the diaphragm overlying the left consider CT at 18-24 months. In a high-risk patient, CT at 3-6 months, then CT at 18-24 months. Nodule size greater than 8 mm   In a low-risk patient, CT at 3-6 months, then consider CT at 18-24 months. In a high-risk patient, CT at 3-6 months, then CT at 18-24 months. - Low risk patients include individuals with minimal or absent history of   smoking and other known risk factors. - High risk patients include individuals with a history or smoking or known   risk factors. Radiology 2017 http://pubs. rsna.org/doi/full/10.1148/radiol. 3051685825         XR CHEST PORTABLE   Final Result   1. Increased pneumonia throughout the right lung remaining most prominent in   the right upper lobe. 2. Emphysema better demonstrated on CT. 3. Possible trace right pleural effusion.          XR CHEST PORTABLE   Final Result   Improved aeration of the right chest with persistent consolidation in the mid   to upper right chest.         CT CHEST WO CONTRAST    (Results Pending)           Assessment/Plan:    Active Hospital Problems    Diagnosis Date Noted    Acute respiratory insufficiency [R06.89] 08/04/2022     Priority: Medium    Acute blood loss anemia [D62] 08/04/2022     Priority: Medium    Hypotension due to drugs [I95.2] 08/04/2022     Priority: Medium    Massive hemoptysis [R04.2] 08/03/2022     Priority: Medium    Epistaxis [R04.0] 08/03/2022     Priority: Medium    Peripherally inserted central catheter (PICC) in place [Z45.2] 07/31/2022     Priority: Medium    NSTEMI (non-ST elevated myocardial infarction) ([address] [I21.4] 07/26/2022     Priority: Medium    Necrotizing pneumonia ([address] [J85.0] 07/26/2022     Priority: Medium    Abscess of upper lobe of right lung with pneumonia ([address] [J85.1] 07/26/2022     Priority: Medium    Pseudomonas respiratory infection [J98.8, B96.5] 07/26/2022     Priority: Medium    Abnormal CT of the chest [R93.89] 07/26/2022     Priority: Medium    Acute on chronic respiratory D/C    Oscar Willoughby MD

## 2022-08-05 NOTE — PROGRESS NOTES
[address] [facility]  Progress note    Patient Name: Clay Wilkerson  Medical Record Number:  [id number]  Primary Care Physician:  HECTOR Corea CNP  Date of Consultation: 8/5/2022    Interval History  Reportedly patient excellently removed one of the Rhino Rocket's today. Did have some bleeding, but was not severe. PHYSICAL EXAM  Patient has 1 Rhino Rocket in the right side. She has what appears to be blood-tinged mucus drainage without obvious bright red blood. ASSESSMENT/PLAN  Epistaxis-patient reportedly excellently removed 1. This was likely traumatic and caused a bit of bleeding. It does not sound as though it was a concerning amount of bleeding. I am going to leave the other Rhino Rocket in at least until tomorrow. I would hold off on the blood thinner until we remove it. I have performed a head and neck physical exam personally or was physically present during the key or critical portions of the service. This note was generated completely or in part utilizing Dragon dictation speech recognition software. Occasionally, words are mistranscribed and despite editing, the text may contain inaccuracies due to incorrect word recognition. If further clarification is needed please contact the office at [phone].

## 2022-08-05 NOTE — PROGRESS NOTES
Speech Language Pathology      Speech Therapy note regarding SLP orders for Clinical Evaluation of Swallowing    SLP attempt 8/5/2022 at 1016 am. RN reporting pt is on a non-re breather currently. RN reporting pt was extubated 8/4/2022 approximately 1400 pm. RN reporting trying to wean pt. Plan:SHELDON held due to respiratory issues and pt on a non-re breather. Plan to re-attempt later today 8/5/2022 if respiratory status improved. Signed  Domi ValenciaMS,CCC,SLP [id number]  Speech and Language Pathologist  8/5/2022 1019 am

## 2022-08-05 NOTE — PROGRESS NOTES
Comprehensive Nutrition Assessment    Type and Reason for Visit:  Reassess    Nutrition Recommendations/Plan:   NPO, Texture/liquid level per SLP  Will resume Ensure TID when po resumes (if on thins)     Malnutrition Assessment:  Malnutrition Status: At risk for malnutrition (Comment) (07/20/22 1218)    Context:  Chronic Illness     Findings of the 6 clinical characteristics of malnutrition:  Energy Intake:  Mild decrease in energy intake (Comment)  Weight Loss:  No significant weight loss     Body Fat Loss:  Mild body fat loss Orbital   Muscle Mass Loss:  No significant muscle mass loss    Fluid Accumulation:  Mild Extremities   Strength:  Not Performed    Nutrition Assessment:    Follow-up. Since last assessment pt required intubation (short period) and was extubated yesterday. Pt is NPO, SLP following but unable to complete assessment as pt on nonrebreather mask. Rhinorocket in place d/t epistaxis. Will continue to monitor. Pt was eating a little better prior to intubation, will continue supplement when po resumes. Nutrition Related Findings:    Reviewed labs Wound Type: Deep Tissue Injury (sacrum)       Current Nutrition Intake & Therapies:    Average Meal Intake: NPO  Average Supplements Intake: NPO  Diet NPO Exceptions are: Sips of Water with Meds    Anthropometric Measures:  Height: 5' 4\" (162.6 cm)  Ideal Body Weight (IBW): 120 lbs (55 kg)    Admission Body Weight: 135 lb 9.3 oz (61.5 kg)  Current Body Weight: 132 lb (59.9 kg), 110.8 % IBW.  Weight Source: Standing Scale  Current BMI (kg/m2): 22.6  Usual Body Weight: 139 lb (63 kg)  % Weight Change (Calculated): -4.2                    BMI Categories: Normal Weight (BMI 22.0 to 24.9) age over 72    Estimated Daily Nutrient Needs:        Energy (kcal/day): 7003-1666 (25-30 x CBW 60)     Protein (g/day): 60- 72 (1-1.2 x CBW 60)     Fluid (ml/day): per provider    Nutrition Diagnosis:   Inadequate oral intake related to inadequate protein-energy intake as evidenced by NPO or clear liquid status due to medical condition    Nutrition Interventions:   Food and/or Nutrient Delivery: Start Oral Diet, Start Oral Nutrition Supplement (when/if appropriate)  Nutrition Education/Counseling: Education not indicated  Coordination of Nutrition Care: Continue to monitor while inpatient       Goals:  Previous Goal Met: No Progress toward Goal(s)  Goals: PO intake 50% or greater       Nutrition Monitoring and Evaluation:   Behavioral-Environmental Outcomes: None Identified  Food/Nutrient Intake Outcomes: Diet Advancement/Tolerance, Food and Nutrient Intake, Supplement Intake  Physical Signs/Symptoms Outcomes: Biochemical Data, Chewing or Swallowing, Constipation, Diarrhea, Fluid Status or Edema, Skin, Weight    Discharge Planning:     Too soon to determine     Luiza Hall, [address] [place] [address],   Contact: [phone]

## 2022-08-05 NOTE — PROGRESS NOTES
Speech Language Pathology    Speech Therapy note regarding SLP orders for Clinical Evaluation of Swallowing    SLP re-attempted 8/5/2022 at 4:18 PM. Respiratory status unchanged form this AM when attempted earlier. Plan: SHELDON held due to respiratory status. Plan to re-attempt later date unless otherwise notified. Signed  Katy Hendricks, Claire Olmos, #[id number]  Speech-Language Pathologist  Portable phone: [phone]

## 2022-08-06 LAB
ANION GAP SERPL CALCULATED.3IONS-SCNC: 7 MMOL/L (ref 3–16)
BASOPHILS ABSOLUTE: 0.1 K/UL (ref 0–0.2)
BASOPHILS RELATIVE PERCENT: 0.7 %
BUN BLDV-MCNC: 8 MG/DL (ref 7–20)
CALCIUM SERPL-MCNC: 8.2 MG/DL (ref 8.3–10.6)
CHLORIDE BLD-SCNC: 98 MMOL/L (ref 99–110)
CO2: 33 MMOL/L (ref 21–32)
CREAT SERPL-MCNC: <0.5 MG/DL (ref 0.6–1.2)
EOSINOPHILS ABSOLUTE: 0.1 K/UL (ref 0–0.6)
EOSINOPHILS RELATIVE PERCENT: 1.9 %
GFR AFRICAN AMERICAN: >60
GFR NON-AFRICAN AMERICAN: >60
GLUCOSE BLD-MCNC: 110 MG/DL (ref 70–99)
HCT VFR BLD CALC: 23.2 % (ref 36–48)
HEMOGLOBIN: 7.8 G/DL (ref 12–16)
LYMPHOCYTES ABSOLUTE: 1.4 K/UL (ref 1–5.1)
LYMPHOCYTES RELATIVE PERCENT: 18.2 %
MAGNESIUM: 1.7 MG/DL (ref 1.8–2.4)
MCH RBC QN AUTO: 30.6 PG (ref 26–34)
MCHC RBC AUTO-ENTMCNC: 33.7 G/DL (ref 31–36)
MCV RBC AUTO: 90.9 FL (ref 80–100)
MONOCYTES ABSOLUTE: 1.1 K/UL (ref 0–1.3)
MONOCYTES RELATIVE PERCENT: 13.9 %
NEUTROPHILS ABSOLUTE: 5.1 K/UL (ref 1.7–7.7)
NEUTROPHILS RELATIVE PERCENT: 65.3 %
PDW BLD-RTO: 14.5 % (ref 12.4–15.4)
PHOSPHORUS: 3.3 MG/DL (ref 2.5–4.9)
PLATELET # BLD: 515 K/UL (ref 135–450)
PMV BLD AUTO: 6.8 FL (ref 5–10.5)
POTASSIUM REFLEX MAGNESIUM: 3.8 MMOL/L (ref 3.5–5.1)
RBC # BLD: 2.55 M/UL (ref 4–5.2)
SODIUM BLD-SCNC: 138 MMOL/L (ref 136–145)
WBC # BLD: 7.8 K/UL (ref 4–11)

## 2022-08-06 PROCEDURE — 6370000000 HC RX 637 (ALT 250 FOR IP): Performed by: NURSE PRACTITIONER

## 2022-08-06 PROCEDURE — 80048 BASIC METABOLIC PNL TOTAL CA: CPT

## 2022-08-06 PROCEDURE — 6360000002 HC RX W HCPCS: Performed by: INTERNAL MEDICINE

## 2022-08-06 PROCEDURE — 94640 AIRWAY INHALATION TREATMENT: CPT

## 2022-08-06 PROCEDURE — 99231 SBSQ HOSP IP/OBS SF/LOW 25: CPT | Performed by: OTOLARYNGOLOGY

## 2022-08-06 PROCEDURE — 94761 N-INVAS EAR/PLS OXIMETRY MLT: CPT

## 2022-08-06 PROCEDURE — 99233 SBSQ HOSP IP/OBS HIGH 50: CPT | Performed by: INTERNAL MEDICINE

## 2022-08-06 PROCEDURE — 2000000000 HC ICU R&B

## 2022-08-06 PROCEDURE — 83735 ASSAY OF MAGNESIUM: CPT

## 2022-08-06 PROCEDURE — 2580000003 HC RX 258: Performed by: INTERNAL MEDICINE

## 2022-08-06 PROCEDURE — 6370000000 HC RX 637 (ALT 250 FOR IP): Performed by: INTERNAL MEDICINE

## 2022-08-06 PROCEDURE — 2700000000 HC OXYGEN THERAPY PER DAY

## 2022-08-06 PROCEDURE — 84100 ASSAY OF PHOSPHORUS: CPT

## 2022-08-06 PROCEDURE — 85025 COMPLETE CBC W/AUTO DIFF WBC: CPT

## 2022-08-06 PROCEDURE — 99291 CRITICAL CARE FIRST HOUR: CPT | Performed by: INTERNAL MEDICINE

## 2022-08-06 PROCEDURE — 92610 EVALUATE SWALLOWING FUNCTION: CPT

## 2022-08-06 RX ORDER — POTASSIUM CHLORIDE 29.8 MG/ML
20 INJECTION INTRAVENOUS PRN
Status: DISCONTINUED | OUTPATIENT
Start: 2022-08-06 | End: 2022-08-16 | Stop reason: HOSPADM

## 2022-08-06 RX ORDER — MAGNESIUM SULFATE 1 G/100ML
1000 INJECTION INTRAVENOUS ONCE
Status: COMPLETED | OUTPATIENT
Start: 2022-08-06 | End: 2022-08-06

## 2022-08-06 RX ORDER — MAGNESIUM SULFATE 1 G/100ML
INJECTION INTRAVENOUS
Status: DISPENSED
Start: 2022-08-06 | End: 2022-08-06

## 2022-08-06 RX ORDER — FUROSEMIDE 10 MG/ML
20 INJECTION INTRAMUSCULAR; INTRAVENOUS ONCE
Status: COMPLETED | OUTPATIENT
Start: 2022-08-06 | End: 2022-08-06

## 2022-08-06 RX ADMIN — MOMETASONE FUROATE AND FORMOTEROL FUMARATE DIHYDRATE 2 PUFF: 200; 5 AEROSOL RESPIRATORY (INHALATION) at 19:40

## 2022-08-06 RX ADMIN — IPRATROPIUM BROMIDE AND ALBUTEROL SULFATE 1 AMPULE: .5; 3 SOLUTION RESPIRATORY (INHALATION) at 08:20

## 2022-08-06 RX ADMIN — IPRATROPIUM BROMIDE AND ALBUTEROL SULFATE 1 AMPULE: .5; 3 SOLUTION RESPIRATORY (INHALATION) at 23:24

## 2022-08-06 RX ADMIN — SODIUM CHLORIDE, PRESERVATIVE FREE 10 ML: 5 INJECTION INTRAVENOUS at 21:11

## 2022-08-06 RX ADMIN — DULOXETINE HYDROCHLORIDE 60 MG: 60 CAPSULE, DELAYED RELEASE ORAL at 08:27

## 2022-08-06 RX ADMIN — GABAPENTIN 600 MG: 300 CAPSULE ORAL at 08:27

## 2022-08-06 RX ADMIN — MEROPENEM 1000 MG: 1 INJECTION, POWDER, FOR SOLUTION INTRAVENOUS at 13:52

## 2022-08-06 RX ADMIN — TIZANIDINE 4 MG: 4 TABLET ORAL at 21:10

## 2022-08-06 RX ADMIN — MOMETASONE FUROATE AND FORMOTEROL FUMARATE DIHYDRATE 2 PUFF: 200; 5 AEROSOL RESPIRATORY (INHALATION) at 08:22

## 2022-08-06 RX ADMIN — FLUCONAZOLE 100 MG: 100 TABLET ORAL at 08:27

## 2022-08-06 RX ADMIN — IPRATROPIUM BROMIDE AND ALBUTEROL SULFATE 1 AMPULE: .5; 3 SOLUTION RESPIRATORY (INHALATION) at 11:53

## 2022-08-06 RX ADMIN — IPRATROPIUM BROMIDE AND ALBUTEROL SULFATE 1 AMPULE: .5; 3 SOLUTION RESPIRATORY (INHALATION) at 19:40

## 2022-08-06 RX ADMIN — ATORVASTATIN CALCIUM 40 MG: 40 TABLET, FILM COATED ORAL at 21:10

## 2022-08-06 RX ADMIN — NYSTATIN 500000 UNITS: 100000 SUSPENSION ORAL at 13:51

## 2022-08-06 RX ADMIN — DULOXETINE HYDROCHLORIDE 60 MG: 60 CAPSULE, DELAYED RELEASE ORAL at 21:11

## 2022-08-06 RX ADMIN — SODIUM CHLORIDE, PRESERVATIVE FREE 10 ML: 5 INJECTION INTRAVENOUS at 08:28

## 2022-08-06 RX ADMIN — IPRATROPIUM BROMIDE AND ALBUTEROL SULFATE 1 AMPULE: .5; 3 SOLUTION RESPIRATORY (INHALATION) at 16:18

## 2022-08-06 RX ADMIN — TOBRAMYCIN 300 MG: 300 SOLUTION RESPIRATORY (INHALATION) at 08:35

## 2022-08-06 RX ADMIN — FUROSEMIDE 20 MG: 10 INJECTION, SOLUTION INTRAMUSCULAR; INTRAVENOUS at 11:38

## 2022-08-06 RX ADMIN — MAGNESIUM SULFATE HEPTAHYDRATE 1000 MG: 1 INJECTION, SOLUTION INTRAVENOUS at 07:27

## 2022-08-06 RX ADMIN — IPRATROPIUM BROMIDE AND ALBUTEROL SULFATE 1 AMPULE: .5; 3 SOLUTION RESPIRATORY (INHALATION) at 04:08

## 2022-08-06 RX ADMIN — NYSTATIN 500000 UNITS: 100000 SUSPENSION ORAL at 17:07

## 2022-08-06 RX ADMIN — TOBRAMYCIN 300 MG: 300 SOLUTION RESPIRATORY (INHALATION) at 20:38

## 2022-08-06 RX ADMIN — GABAPENTIN 600 MG: 300 CAPSULE ORAL at 21:11

## 2022-08-06 RX ADMIN — MEROPENEM 1000 MG: 1 INJECTION, POWDER, FOR SOLUTION INTRAVENOUS at 04:52

## 2022-08-06 RX ADMIN — NYSTATIN 500000 UNITS: 100000 SUSPENSION ORAL at 21:10

## 2022-08-06 RX ADMIN — NYSTATIN 500000 UNITS: 100000 SUSPENSION ORAL at 08:27

## 2022-08-06 RX ADMIN — MEROPENEM 1000 MG: 1 INJECTION, POWDER, FOR SOLUTION INTRAVENOUS at 21:10

## 2022-08-06 ASSESSMENT — PAIN SCALES - WONG BAKER
WONGBAKER_NUMERICALRESPONSE: 0

## 2022-08-06 ASSESSMENT — PAIN SCALES - GENERAL
PAINLEVEL_OUTOF10: 0
PAINLEVEL_OUTOF10: 0

## 2022-08-06 NOTE — PROGRESS NOTES
with potentially decreased AP transit; delayed swallow initiation and decreased laryngeal elevation. Immediate prolonged cough after large bolus of mildly nectar thick via cup; no overt s/s with smaller sips of thin and thick liquids. However, intermittent productive cough noted, similar to baseline cough. No overt s/s or difficulty with puree and moderately honey thick liquids, controlled sips. ST will initiate tx for PO tolerance and skilled trials for solid/liquid upgrade. Recommended Diet and Intervention 8/6/2022:  Diet Solids Recommendation:  Dysphagia I Puree  Liquid Consistency Recommendation:  Moderately (honey) thick liquids  Recommended form of Meds: Meds in puree        Compensatory Swallowing Strategies:  Upright as possible with all PO intake , Small bites/sips , Remain upright 30-45 min     SHORT TERM DYSPHAGIA GOALS/PLAN OF CARE: Speech therapy for dysphagia tx 3-5 times per week during acute care stay. Dysphagia Goals: The patient will tolerate recommended diet without observed clinical signs of aspiration, The patient will tolerate minced and moist foods 10/10., The patient will tolerate mildly thick liquids without signs and symptoms of aspiration 10/10 via cup., The patient/caregiver will demonstrate understanding of compensatory strategies for improved swallowing safety. Dysphagia Therapeutic Intervention:  Diet Tolerance Monitoring , Patient/Family Education , Therapeutic Trials with SLP     Dysphagia Prognosis: [x] good []fair  []guarded []poor     Discharge Recommendations: Discharge recommendations to be determined pending ongoing follow-up during acute care stay      TEST DATA  Vision: [facility]  Hearing: WFL    Consistencies Presented:   Ice chips; Thin liquid;   Mildly / nectar thick liquid ;    Moderately / honey thick liquid  Puree food  Minced and moist food     Cognitive/behavioral:   [x]orientation [x] to self [x] place [x] date [x] reason for admit [] purpose of learning   [] No evidence of comprehension     If patient discharges prior to next visit, this note will serve as discharge.      Timed Code Minutes: 0  Total Treatment Minutes: 45    Electronically signed by:    Kole Conte MS, [address] [place] [address] [id number]  Speech Language Pathologist

## 2022-08-06 NOTE — PROGRESS NOTES
Hospitalist Progress Note      PCP: HECTOR Ocasio - CNP    Date of Admission: 7/18/2022    Chief Complaint: respiratory distress    Hospital Course:      Subjective:   Hemoptysis but on endoscopy turns out to have been epistaxis. Has rhinorocket in right nare  Short lived period on vent- now extubated. No F/C  Some SOB  Still on venturi mask  Feeling better  No new symptoms        Medications:  Reviewed    Infusion Medications    norepinephrine Stopped (08/06/22 1101)    sodium chloride 5 mL/hr at 08/05/22 2000    dextrose       Scheduled Medications    magnesium sulfate        ipratropium-albuterol  1 ampule Inhalation Q4H    fluconazole  100 mg Oral Daily    [Held by provider] enoxaparin  40 mg SubCUTAneous Daily    meropenem  1,000 mg IntraVENous Q8H    tobramycin (PF)  300 mg Nebulization BID    [Held by provider] aspirin  81 mg Oral Daily    nystatin  5 mL Oral 4x Daily    [Held by provider] prasugrel  10 mg Oral Daily    sodium chloride flush  5-40 mL IntraVENous 2 times per day    atorvastatin  40 mg Oral Nightly    tiZANidine  4 mg Oral Nightly    gabapentin  600 mg Oral BID    DULoxetine  60 mg Oral BID    mometasone-formoterol  2 puff Inhalation BID     PRN Meds: potassium chloride, sodium chloride, sodium chloride flush, sodium chloride, acetaminophen, perflutren lipid microspheres, traZODone, ipratropium-albuterol, cetirizine, albuterol sulfate HFA, glucose, dextrose bolus **OR** dextrose bolus, glucagon (rDNA), dextrose, ondansetron, polyethylene glycol      Intake/Output Summary (Last 24 hours) at 8/6/2022 1611  Last data filed at 8/6/2022 1330  Gross per 24 hour   Intake 626.99 ml   Output 1840 ml   Net -1213.01 ml         Exam:    /73   Pulse 100   Temp 98.5 °F (36.9 °C) (Axillary)   Resp 18   Ht 5' 4\" (1.626 m)   Wt 131 lb (59.4 kg)   SpO2 100%   BMI 22.49 kg/m²     General appearance: Anxious+, appears stated age and cooperative.   HEENT: Pupils equal, round, and reactive to light. Conjunctivae/corneas clear. Neck: Supple, with full range of motion. No jugular venous distention. Trachea midline. Respiratory:  Comfortably on Bipap. Crackles bibasally. Cardiovascular: Regular rate and rhythm with normal S1/S2 without murmurs, rubs or gallops. Abdomen: Soft, non-tender, non-distended with normal bowel sounds. Musculoskeletal: No clubbing, cyanosis or edema bilaterally. Full range of motion without deformity. Skin: Skin color, texture, turgor normal.  No rashes or lesions. Neurologic:  Neurovascularly intact without any focal sensory/motor deficits. Cranial nerves: II-XII intact, grossly non-focal.  Psychiatric: Alert and oriented, thought content appropriate, normal insight  Capillary Refill: Brisk,< 3 seconds   Peripheral Pulses: +2 palpable, equal bilaterally       Labs:   Recent Labs     08/04/22 0529 08/05/22  0408 08/06/22  0419   WBC 9.4 8.0 7.8   HGB 7.9* 7.4* 7.8*   HCT 23.5* 22.1* 23.2*   * 476* 515*       Recent Labs     08/04/22 0529 08/05/22 0408 08/06/22  0419   * 133* 138   K 4.3 3.9 3.8    97* 98*   CO2 25 30 33*   BUN 12 12 8   CREATININE <0.5* <0.5* <0.5*   CALCIUM 7.6* 8.0* 8.2*   PHOS 3.2 2.7 3.3       No results for input(s): AST, ALT, BILIDIR, BILITOT, ALKPHOS in the last 72 hours. No results for input(s): INR in the last 72 hours. No results for input(s): Elena Height in the last 72 hours. Urinalysis:      Lab Results   Component Value Date/Time    NITRU Negative 07/22/2022 06:31 AM    WBCUA 2 07/22/2022 06:31 AM    BACTERIA None Seen 07/22/2022 06:31 AM    RBCUA 5 07/22/2022 06:31 AM    BLOODU Negative 07/22/2022 06:31 AM    SPECGRAV 1.049 07/22/2022 06:31 AM    GLUCOSEU Negative 07/22/2022 06:31 AM       Radiology:  XR CHEST PORTABLE   Final Result   Endotracheal tube with the tip approximately 1 cm above the song. Consider   retraction by approximately 3.5 cm.   Enteric tube with the tip and the side   hole below the diaphragm overlying the left upper abdomen, likely within the   fundus of the stomach. Redemonstration of multifocal pneumonia affecting the right lung. Small   right pleural effusion. XR CHEST PORTABLE   Final Result   Unchanged multifocal right-sided pneumonia. XR CHEST PORTABLE   Final Result   Stable multifocal airspace disease in the right lung with probable upper lobe   abscess. XR CHEST PORTABLE   Final Result   Stable multifocal airspace disease in the right lung, including probable   pulmonary abscess in the right upper lobe. CT CHEST WO CONTRAST   Final Result   1. Mixed consolidative and ground-glass opacities as well as interstitial   thickening throughout the majority of the right lung compatible with   pneumonia. 2. There is a cavity in the right upper lobe demonstrating an air-fluid level   measuring up to 9.7 cm concerning for abscess formation. 3. Trace right pleural effusion. 4. Prominent and enlarged mediastinal lymph nodes, likely reactive. 5. Stable 6 mm left lower lobe pulmonary nodule. RECOMMENDATIONS:   Fleischner Society guidelines for follow-up and management of incidentally   detected pulmonary nodules:      Single Solid Nodule:      Nodule size less than 6 mm   In a low-risk patient, no routine follow-up. In a high-risk patient, optional CT at 12 months. Nodule size equals 6-8 mm   In a low-risk patient, CT at 6-12 months, then consider CT at 18-24 months. In a high-risk patient, CT at 6-12 months, then CT at 18-24 months. Nodule size greater than 8 mm         In a low-risk patient, consider CT at 3 months, PET/CT, or tissue sampling. In a high-risk patient, consider CT at 3 months, PET/CT, or tissue sampling. Multiple Solid Nodules:      Nodule size less than 6 mm   In a low-risk patient, no routine follow-up. In a high-risk patient, optional CT at 12 months.       Nodule size equals 6-8 mm   In a low-risk patient, CT at 3-6 months, then consider CT at 18-24 months. In a high-risk patient, CT at 3-6 months, then CT at 18-24 months. Nodule size greater than 8 mm   In a low-risk patient, CT at 3-6 months, then consider CT at 18-24 months. In a high-risk patient, CT at 3-6 months, then CT at 18-24 months. - Low risk patients include individuals with minimal or absent history of   smoking and other known risk factors. - High risk patients include individuals with a history or smoking or known   risk factors. Radiology 2017 http://pubs. rsna.org/doi/full/10.1148/radiol. 5284026475         XR CHEST PORTABLE   Final Result   1. Increased pneumonia throughout the right lung remaining most prominent in   the right upper lobe. 2. Emphysema better demonstrated on CT. 3. Possible trace right pleural effusion.          XR CHEST PORTABLE   Final Result   Improved aeration of the right chest with persistent consolidation in the mid   to upper right chest.         CT CHEST WO CONTRAST    (Results Pending)   XR CHEST PORTABLE    (Results Pending)           Assessment/Plan:    Active Hospital Problems    Diagnosis Date Noted    Acute respiratory insufficiency [R06.89] 08/04/2022     Priority: Medium    Acute blood loss anemia [D62] 08/04/2022     Priority: Medium    Hypotension due to drugs [I95.2] 08/04/2022     Priority: Medium    Massive hemoptysis [R04.2] 08/03/2022     Priority: Medium    Epistaxis [R04.0] 08/03/2022     Priority: Medium    Peripherally inserted central catheter (PICC) in place [Z45.2] 07/31/2022     Priority: Medium    NSTEMI (non-ST elevated myocardial infarction) ([facility] [I21.4] 07/26/2022     Priority: Medium    Necrotizing pneumonia ([facility] [J85.0] 07/26/2022     Priority: Medium    Multifocal pneumonia [J18.9] 07/26/2022     Priority: Medium    Pseudomonas respiratory infection [J98.8, B96.5] 07/26/2022     Priority: Medium    Abnormal CT of the chest [R93.89] 07/26/2022 Priority: Medium    Acute on chronic respiratory failure with hypoxia and hypercapnia (HCC) [J96.21, J96.22] 07/18/2022     Priority: Medium    Abnormal EKG [R94.31] 07/18/2022     Priority: Medium    Chronic obstructive pulmonary disease ([facility] [J44.9]      Priority: Medium    Pneumonia of right lung due to infectious organism [J18.9] 07/03/2022     Priority: Medium    COPD, severe ([facility] [J44.9] 02/24/2017    Chronic pain syndrome [G89.4] 03/14/2016    Hypercholesteremia [E78.00] 11/04/2013    Fibromyalgia [M79.7]     Smoking [F17.200] 08/27/2011     Acute on chronic hypoxic respiratory failure     2. 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 in situ   -CT surgery consulted:  would need pneumonectomy for which she is a poor candidate --> medical therapy only  - Respiratory sputum culture repeated on 7/30 without pseudomonnas  -  3. Hemoptysis sec to 2.  -DC lovenox.  -Keep ASA and prasugrel going  -HnH q8  -Transfuse PRBCs if hb is below 7.0  -Transfer out of ICU in next 24hrs        4. Critical coronary artery disease single-vessel  Status post PCI to left circumflex by Dr Babita Cerrato on this admission  with stent  Now on antiplatelet therapy     5. Anemia, AOCD. Now has hemoptysis. FU iron studies TIBC ferritin % and reticulocyte count  No indication to transfusion yet       COPD, severe ([facility] - without acute exacerbation. Will provide Nebulizer treatments as needed and continue home medications. Patient will be monitored closely, and deep breathing and coughing will be encouraged while awake. Hyperlipidemia - No current evidence of Rhabdomyolysis or other adverse effects. Continue statin therapy while in the hospital     Chronic pain syndrome -continue her home pain med regimen     Fibromyalgia - provide supportive care    DVT Prophylaxis: heparin  Diet: ADULT DIET; Dysphagia - Pureed;  Moderately Thick (Honey)  Code Status: Full Code    PT/OT Eval Status: evaluating    Dispo -PCU, medically ready for D/C    Lio Gomes MD

## 2022-08-06 NOTE — PROGRESS NOTES
Infectious Disease Follow up Notes  Admit Date: 7/18/2022  Hospital Day: 20    Antibiotics :   IV Meropenem  Inhaled Tobramycin     CHIEF COMPLAINT:      Rt UL lung abscess  Severe Necrotizing PNA  Pseudomonas PNA  Hypoxic resp failure  Epistaxis       Subjective interval History :  78 y. o.woman with a past medical history of COPD, depression, chronic hypoxic respiratory failure now on   4 L of oxygen via nasal cannula, diabetes recent admission for COPD exacerbation and pneumonia diagnosed to have Pseudomonas based on sputum studies. She was on IV cefepime transition to oral levofloxacin on discharge. She was sent to [address] rehab facility and now admitted because of worsening shortness of breath increased sputum production. Sputum culture again on this admission positive for Pseudomonas with some resistance pattern, it is now become resistant to quinolones. CT chest on this admission with progressive pneumonia right upper lobe cavitary lesion possible lung abscess. Extensive consolidation  see images -  This is a new finding on the CT chest compared to her previous CT chest on 6/20/22. Given the necrotizing pneumonia with Pseudomonas infection and lung abscess formation we are consulted for recommendations. Interval History : Remains in ICU using Venturi mask for oxygenation. Has minimal epistaxis from the right nostril. She is able to spit up some or clotted blood. Cough seems to be improving. Rhino Rocket in place. Tolerating antibiotic therapy okay. Past Medical History:    Past Medical History:   Diagnosis Date    CAD (coronary artery disease) 07/19/2022    NSTEMI, PCI LCx    Chronic pain syndrome     Percocet.  Dr. Nilsa Fraga    Colorectal polyps     COPD (chronic obstructive pulmonary disease) ([facility] [address]     CTS (carpal tunnel syndrome)     BILATERAL    DDD (degenerative disc disease), lumbar     Depression Family hx of colon cancer     Fibromyalgia     Hyperlipemia     IBS (irritable bowel syndrome)     Lumbar spondylosis     New onset type 2 diabetes mellitus ([facility] [address] 02/03/2020    On home O2     4L    Pneumonia 07/2022    P.aer    Urticaria, chronic        Past Surgical History:    Past Surgical History:   Procedure Laterality Date    BRONCHOSCOPY N/A 8/3/2022    BRONCHOSCOPY performed by Purvi Ferguson DO at [facility] Bilateral     CATARACT EXTRACTION      CERVICAL POLYP REMOVAL  09/2004    CORONARY ANGIOPLASTY WITH STENT PLACEMENT  07/19/2022    LCx 99. PCI/stent. INTRACAPSULAR CATARACT EXTRACTION Left 06/23/2020    Phacoemulsification with intraocular lens implant performed by Preston Mccabe MD at [address] Right 07/14/2020    Phacoemulsification with intraocular lens implant performed by Preston Mccabe MD at [address]      patient denies        Current Medications:    Outpatient Medications Marked as Taking for the 7/18/22 encounter [facility] Encounter)   Medication Sig Dispense Refill    aspirin 81 MG EC tablet Take 1 tablet by mouth in the morning. 30 tablet 3    prasugrel (EFFIENT) 10 MG TABS Take 1 tablet by mouth in the morning. 30 tablet 1       Allergies:  Patient has no known allergies.     Immunizations :   Immunization History   Administered Date(s) Administered    COVID-19, MODERNA BLUE border, Primary or Immunocompromised, (age 12y+), IM, 100 mcg/0.5mL 03/08/2021, 04/05/2021    COVID-19, MODERNA Booster BLUE border, (age 18y+), IM, 50mcg/0.25mL 12/01/2021    Influenza Vaccine, unspecified formulation 01/10/2017    Influenza, High Dose (Fluzone 65 yrs and older) 11/04/2013, 01/21/2016, 09/01/2017, 10/30/2018, 09/18/2020    Influenza, Quadv, adjuvanted, 65 yrs +, IM, PF (Fluad) 09/27/2021    Influenza, Triv, inactivated, subunit, adjuvanted, IM (Fluad 65 yrs and older) 09/13/2019    Pneumococcal Conjugate 13-valent ([id number]) 01/19/2015    Pneumococcal Conjugate Vaccine 01/10/2017    Pneumococcal Polysaccharide ([id number]) 10/12/2012    Tdap (Boostrix, Adacel) 07/17/2007    Zoster Recombinant (Shingrix) 11/21/2019       Social History:     Social History     Tobacco Use    Smoking status: Every Day     Packs/day: 1.00     Years: 55.00     Pack years: 55.00     Types: Cigarettes     Start date: 1/1/1962    Smokeless tobacco: Never    Tobacco comments:     almost ready to quit   Vaping Use    Vaping Use: Never used   Substance Use Topics    Alcohol use: No     Alcohol/week: 0.0 standard drinks    Drug use: No     Social History     Tobacco Use   Smoking Status Every Day    Packs/day: 1.00    Years: 55.00    Pack years: 55.00    Types: Cigarettes    Start date: 1/1/1962   Smokeless Tobacco Never   Tobacco Comments    almost ready to quit      Family History   Problem Relation Age of Onset    Cancer Father         COLON     Alzheimer's Disease Mother     Heart Disease Brother     Heart Failure Brother     Diabetes Daughter     Diabetes Daughter     Diabetes Daughter           REVIEW OF SYSTEMS:      Constitutional:  negative for fevers, chills, night sweats  Eyes:  negative for blurred vision, eye discharge, visual disturbance   HEENT:  negative for hearing loss, ear drainage,nasal congestion  Respiratory:  r cough,+  shortness of breath++ or hemoptysis   Cardiovascular:  negative for chest pain, palpitations, syncope  Gastrointestinal:  negative for nausea, vomiting, diarrhea, constipation, abdominal pain  Genitourinary:  negative for frequency, dysuria, urinary incontinence, hematuria  Hematologic/Lymphatic:  negative for easy bruising, bleeding and lymphadenopathy  Allergic/Immunologic:  negative for recurrent infections, angioedema, anaphylaxis   Endocrine:  negative for weight changes, polyuria, polydipsia and polyphagia  Musculoskeletal:  negative for joint pain, swelling, decreased range of motion  Neurological:  negative for headaches, slurred speech, unilateral weakness  Psychiatric: negative for hallucinations,confusion,agitation.              PHYSICAL EXAM:      Vitals:    /65   Pulse (!) 113   Temp 98 °F (36.7 °C) (Axillary)   Resp 18   Ht 5' 4\" (1.626 m)   Wt 131 lb (59.4 kg)   SpO2 (!) 87%   BMI 22.49 kg/m²     General Appearance: awake and in some  acute distress, ++  pallor,Epistaxis Rhino rocket in place Venturi mask+    Skin: warm and dry, no rash or erythema  Head: normocephalic and atraumatic  Eyes: pupils equal, round, and reactive to light, conjunctivae normal  ENT: tympanic membrane, external ear and ear canal normal bilaterally, nose without deformity, nasal mucosa and turbinates normal without polyps  Neck: supple and non-tender without mass, no thyromegaly  no cervical lymphadenopathy  Pulmonary/Chest:  Rt UL coarse crepts+ +  wheezes, rales or rhonchi, normal air movement, in some respiratory distress  Cardiovascular: normal rate, regular rhythm, normal S1 and S2, no murmurs, rubs, clicks, or gallops, no carotid bruits  Abdomen: soft, non-tender, non-distended, normal bowel sounds, no masses or organomegaly  Extremities: no cyanosis, clubbing or edema  Musculoskeletal: normal range of motion, no joint swelling, deformity or tenderness  Integumentary: No rashes, no abnormal skin lesions, no petechiae  Neurologic: reflexes normal and symmetric, no cranial nerve deficit           Lines: PICC     Data Review:    CBC:   Lab Results   Component Value Date    WBC 7.8 08/06/2022    HGB 7.8 (L) 08/06/2022    HCT 23.2 (L) 08/06/2022    MCV 90.9 08/06/2022     (H) 08/06/2022     RENAL:   Lab Results   Component Value Date    CREATININE <0.5 (L) 08/06/2022    BUN 8 08/06/2022     08/06/2022    K 3.8 08/06/2022    CL 98 (L) 08/06/2022    CO2 33 (H) 08/06/2022     SED RATE: No results found for: SEDRATE  CK: No results found for: CKTOTAL  CRP: No results found for: CRP  Hepatic Function Panel:   Lab Results   Component Value Date/Time    ALKPHOS 88 07/18/2022 05:20 PM    ALT 65 07/18/2022 05:20 PM    AST 61 07/18/2022 05:20 PM    PROT 5.6 07/18/2022 05:20 PM    PROT 6.9 10/12/2012 11:00 AM    BILITOT 0.5 07/18/2022 05:20 PM    LABALBU 2.4 07/18/2022 05:20 PM     UA:  Lab Results   Component Value Date/Time    COLORU Yellow 07/22/2022 06:31 AM    CLARITYU Clear 07/22/2022 06:31 AM    GLUCOSEU Negative 07/22/2022 06:31 AM    BILIRUBINUR Negative 07/22/2022 06:31 AM    BILIRUBINUR neg 10/17/2019 11:50 AM    KETUA TRACE 07/22/2022 06:31 AM    SPECGRAV 1.049 07/22/2022 06:31 AM    BLOODU Negative 07/22/2022 06:31 AM    PHUR 6.5 07/22/2022 06:31 AM    PROTEINU 30 07/22/2022 06:31 AM    UROBILINOGEN 0.2 07/22/2022 06:31 AM    NITRU Negative 07/22/2022 06:31 AM    LEUKOCYTESUR Negative 07/22/2022 06:31 AM    LABMICR YES 07/22/2022 06:31 AM    URINETYPE NotGiven 07/22/2022 06:31 AM      Urine Microscopic:   Lab Results   Component Value Date/Time    LABCAST 10-20 Hyaline 01/10/2017 06:19 PM    BACTERIA None Seen 07/22/2022 06:31 AM    COMU see below 07/03/2022 03:11 PM    HYALCAST 2 07/22/2022 06:31 AM    WBCUA 2 07/22/2022 06:31 AM    RBCUA 5 07/22/2022 06:31 AM    EPIU 1 07/22/2022 06:31 AM     Urine Reflex to Culture:   Lab Results   Component Value Date/Time    URRFLXCULT Not Indicated 07/03/2022 03:11 PM         MICRO: cultures reviewed and updated by me   Blood Culture:          Culture, Respiratory [[id number]] (Abnormal)  Collected: 07/22/22 1200   Order Status: Completed Specimen: Sputum Expectorated Updated: 07/24/22 0801    CULTURE, RESPIRATORY Rare growth normal respiratory fabiana with Abnormal     Gram Stain Result No Epithelial Cells seen   3+ WBC's (Polymorphonuclear)   No organisms seen     Organism Pseudomonas aeruginosa Abnormal     CULTURE, RESPIRATORY Light growth   Narrative:     ORDER#: [id number]                          ORDERED BY: CIRA KEBEDE   SOURCE: Sputum Expectorated                COLLECTED:  07/22/22 12:00   ANTIBIOTICS AT GURWINDER.:                      RECEIVED :  07/22/22 12:22   Respiratory Culture [[id number]] (Abnormal)  Collected: 07/19/22 2030   Order Status: Completed Specimen: Sputum Expectorated Updated: 07/23/22 0748    CULTURE, RESPIRATORY Light growth normal respiratory fabiana with Abnormal     Gram Stain Result 2+ Gram positive cocci   2+ WBC's (Polymorphonuclear)   1+ Epithelial Cells     Organism Pseudomonas aeruginosa Abnormal     CULTURE, RESPIRATORY Light growth   Narrative:     ORDER#: [id number]                          ORDERED BY: FARZANA GAN   SOURCE: Sputum Expectorated                COLLECTED:  07/19/22 20:30   ANTIBIOTICS AT GURWINDER.:                      RECEIVED :  07/19/22 21:19   Culture, Blood 1 [[id number]] Collected: 07/18/22 1841   Order Status: Completed Specimen: Blood Updated: 07/22/22 2015    Blood Culture, Routine No Growth after 4 days of incubation. Narrative:     ORDER#: [id number]                          ORDERED BY: Jordy Kuo   SOURCE: Blood                              COLLECTED:  07/18/22 18:41   ANTIBIOTICS AT GURWINDER.:                      RECEIVED :  07/18/22 18:56   If child <=2 yrs old please draw pediatric bottle. ~Blood Culture 1   Culture, Blood 2 [[id number]] Collected: 07/18/22 1850   Order Status: Completed Specimen: Blood Updated: 07/22/22 2015    Culture, Blood 2 No Growth after 4 days of incubation. Narrative:     ORDER#: [id number]                          ORDERED BY: Jordy Kuo   SOURCE: Blood                              COLLECTED:  07/18/22 18:50   ANTIBIOTICS AT GURWINDER.:                      RECEIVED :  07/18/22 18:56   If child <=2 yrs old please draw pediatric bottle. ~Blood Culture #2   Strep Pneumoniae Antigen [[id number]] Collected: 07/20/22 1020   Order Status: Completed Specimen: Urine, clean catch Updated: 07/21/22 0841    STREP PNEUMONIAE ANTIGEN, URINE -- Presumptive Negative   Presumptive negative suggests no current or recent   pneumococcal infection. Infection due to Strep pneumoniae   cannot be ruled out since the antigen present in the sample   may be below the detection limit of the test.   Normal Range:Presumptive Negative    Narrative:     ORDER#: [id number]                          ORDERED BY: FARZANA GAN   SOURCE: Urine Clean Catch                  COLLECTED:  07/20/22 10:20   ANTIBIOTICS AT GURWINDER.:                      RECEIVED :  07/20/22 10:27   Legionella antigen, urine [[id number]] Collected: 07/20/22 1020   Order Status: Completed Specimen: Urine, catheter Updated: 07/21/22 0840    L. pneumophila Serogp 1 Ur Ag --    Presumptive Negative   No Legionella pneumophila serogroup 1 antigens detected. A negative result does not exclude infection with   Legionella pneumophila serogroup 1 nor does it rule out   other microbial-caused respiratory infections or   disease caused by other serogroups of   Legionella pneumophila.    Normal Range: Presumptive Negative    Narrative:     ORDER#: [id number]                          ORDERED BY: FARZANA GAN   SOURCE: Urine Catheter                     COLLECTED:  07/20/22 10:20   ANTIBIOTICS AT GURWINDER.:                      RECEIVED :  07/20/22 10:28   Sputum gram stain [[id number]] Collected: 07/19/22 2030   Order Status: Canceled Specimen: Sputum Expectorated    Rapid influenza A/B antigens [[id number]] Collected: 07/19/22 0210   Order Status: Completed Specimen: Nares Updated: 07/19/22 0245    Rapid Influenza A Ag Negative    Rapid Influenza B Ag Negative   COVID-19, Rapid [[id number]] Collected: 07/18/22 1825   Order Status: Completed Specimen: Nasopharyngeal Swab Updated: 07/18/22 1854    SARS-CoV-2, NAAT Not Detected    Comment: Rapid NAAT:   Negative results should be treated as presumptive and,   if inconsistent with clinical signs and symptoms or necessary for   patient management, should be tested with an LABGRAM  07/30/2022 04:00 PM     3+ WBC's (Mononuclear)  1+ Epithelial Cells  1+ Gram positive cocci       AFB:No results found for: AFBSMEAR  Viral Culture:  Lab Results   Component Value Date/Time    COVID19 Not Detected 08/01/2022 01:55 PM     Urine Culture: No results for input(s): LABURIN in the last 72 hours. IMAGING:    XR CHEST PORTABLE   Final Result   Endotracheal tube with the tip approximately 1 cm above the song. Consider   retraction by approximately 3.5 cm. Enteric tube with the tip and the side   hole below the diaphragm overlying the left upper abdomen, likely within the   fundus of the stomach. Redemonstration of multifocal pneumonia affecting the right lung. Small   right pleural effusion. XR CHEST PORTABLE   Final Result   Unchanged multifocal right-sided pneumonia. XR CHEST PORTABLE   Final Result   Stable multifocal airspace disease in the right lung with probable upper lobe   abscess. XR CHEST PORTABLE   Final Result   Stable multifocal airspace disease in the right lung, including probable   pulmonary abscess in the right upper lobe. CT CHEST WO CONTRAST   Final Result   1. Mixed consolidative and ground-glass opacities as well as interstitial   thickening throughout the majority of the right lung compatible with   pneumonia. 2. There is a cavity in the right upper lobe demonstrating an air-fluid level   measuring up to 9.7 cm concerning for abscess formation. 3. Trace right pleural effusion. 4. Prominent and enlarged mediastinal lymph nodes, likely reactive. 5. Stable 6 mm left lower lobe pulmonary nodule. RECOMMENDATIONS:   Fleischner Society guidelines for follow-up and management of incidentally   detected pulmonary nodules:      Single Solid Nodule:      Nodule size less than 6 mm   In a low-risk patient, no routine follow-up. In a high-risk patient, optional CT at 12 months.       Nodule size equals 6-8 mm   In a low-risk patient, CT at 6-12 months, then consider CT at 18-24 months. In a high-risk patient, CT at 6-12 months, then CT at 18-24 months. Nodule size greater than 8 mm         In a low-risk patient, consider CT at 3 months, PET/CT, or tissue sampling. In a high-risk patient, consider CT at 3 months, PET/CT, or tissue sampling. Multiple Solid Nodules:      Nodule size less than 6 mm   In a low-risk patient, no routine follow-up. In a high-risk patient, optional CT at 12 months. Nodule size equals 6-8 mm   In a low-risk patient, CT at 3-6 months, then consider CT at 18-24 months. In a high-risk patient, CT at 3-6 months, then CT at 18-24 months. Nodule size greater than 8 mm   In a low-risk patient, CT at 3-6 months, then consider CT at 18-24 months. In a high-risk patient, CT at 3-6 months, then CT at 18-24 months. - Low risk patients include individuals with minimal or absent history of   smoking and other known risk factors. - High risk patients include individuals with a history or smoking or known   risk factors. Radiology 2017 http://pubs. rsna.org/doi/full/10.1148/radiol. 5546999765         XR CHEST PORTABLE   Final Result   1. Increased pneumonia throughout the right lung remaining most prominent in   the right upper lobe. 2. Emphysema better demonstrated on CT. 3. Possible trace right pleural effusion. XR CHEST PORTABLE   Final Result   Improved aeration of the right chest with persistent consolidation in the mid   to upper right chest.         CT CHEST WO CONTRAST    (Results Pending)   XR CHEST PORTABLE    (Results Pending)     XR CHEST PORTABLE   Final Result   1. Increased pneumonia throughout the right lung remaining most prominent in   the right upper lobe. 2. Emphysema better demonstrated on CT. 3. Possible trace right pleural effusion.            XR CHEST PORTABLE   Final Result   Improved aeration of the right chest with persistent consolidation in the mid   to upper right chest.                All pertinent images and reports for the current Hospitalization were reviewed by me.        Scheduled Meds:   magnesium sulfate        furosemide  20 mg IntraVENous Once    ipratropium-albuterol  1 ampule Inhalation Q4H    fluconazole  100 mg Oral Daily    [Held by provider] enoxaparin  40 mg SubCUTAneous Daily    meropenem  1,000 mg IntraVENous Q8H    tobramycin (PF)  300 mg Nebulization BID    [Held by provider] aspirin  81 mg Oral Daily    nystatin  5 mL Oral 4x Daily    [Held by provider] prasugrel  10 mg Oral Daily    sodium chloride flush  5-40 mL IntraVENous 2 times per day    atorvastatin  40 mg Oral Nightly    tiZANidine  4 mg Oral Nightly    gabapentin  600 mg Oral BID    DULoxetine  60 mg Oral BID    mometasone-formoterol  2 puff Inhalation BID       Continuous Infusions:   norepinephrine 2 mcg/min (08/06/22 0645)    sodium chloride 5 mL/hr at 08/05/22 2000    dextrose         PRN Meds:  potassium chloride, sodium chloride, sodium chloride flush, sodium chloride, acetaminophen, perflutren lipid microspheres, traZODone, ipratropium-albuterol, cetirizine, albuterol sulfate HFA, glucose, dextrose bolus **OR** dextrose bolus, glucagon (rDNA), dextrose, ondansetron, polyethylene glycol      Assessment:     Patient Active Problem List   Diagnosis    Osteopenia-last dexa 2009 repeat 2015 (-2.4 hip)--advised tx    Colon polyp, hyperplastic,-(done 3/11-repeat 3-5 yrs--dr Laurence Barillas)    Family history of colon cancer    CTS (carpal tunnel syndrome)BILATERAL-s/p surgical repair--resolved     Postmenopausal status-last mammogram 2/15 wnl last pap 12/13--wnl    Nondependent alcohol abuse, in remission    Urticaria, chronic    Smoking    Chronic back pain-(djd) saw dr Baldo Carbajal afford surgery--seeing dr Li Lord for pain meds    Fibromyalgia    Hypercholesteremia    Lumbar spondylosis    Vitamin D deficiency--severe--started 50,000 iu 2x/ wk 11/13    Insomnia--on elevil w help    Constipation--on daily miralax    Depression    Chronic pain syndrome    Muscle cramping    Acute on chronic respiratory failure with hypercapnia (HCC)    ROLY (acute kidney injury) ([facility] [address]    Severe sepsis (HCC)    Gastroesophageal reflux disease    COPD, severe (HCC)    Chronic hypoxemic respiratory failure (HCC)    Degeneration of lumbar or lumbosacral intervertebral disc    Trochanteric bursitis of right hip    COPD exacerbation (HCC)    Diabetes ([facility] [address]    Controlled type 2 diabetes mellitus without complication, without long-term current use of insulin (HCC)    Age-related osteoporosis without current pathological fracture- started alendronate 9/2021    Pulmonary nodule seen on imaging study    Pneumonia of right lung due to infectious organism    General weakness    Elevated lactic acid level    Community acquired pneumonia of right lung    Chronic obstructive pulmonary disease (HCC)    Acute respiratory failure with hypoxia (HCC)    Acute on chronic respiratory failure with hypoxia and hypercapnia (HCC)    Abnormal EKG    NSTEMI (non-ST elevated myocardial infarction) (HCC)    Necrotizing pneumonia (HCC)    Multifocal pneumonia    Pseudomonas respiratory infection    Abnormal CT of the chest    Peripherally inserted central catheter (PICC) in place    Massive hemoptysis    Epistaxis    Acute respiratory insufficiency    Acute blood loss anemia    Hypotension due to drugs     Severe necrotizing Pseudomonas pneumonia  Right upper lobe cavitary lesion  Right right lung evolving abscess  Right lung dense consolidation of  Pseudomonas pneumonia developing some resistance  COPD exacerbation  Hypoxic respiratory failure  Coronary artery disease  Status post cardiac cath  Chronic smoking  Abnormal CT chest  BNP elevation  COVID-19 negative  Epistaxis severe vs Hemoptysis now intubated sedated on the ventilator for t airway protection 8/3/22       Unfortunately she developed progressive changes with dense consolidation and lung abscess secondary to Pseudomonas pneumonia. Pseudomonas appears to have developed resistance to quinolones while on therapy this is concerning. CT chest on this admission grossly abnormal and definitely there is progressive changes given the severity of the -pneumonia will need IV antibiotics     OPAT d/w pt she needs to QUIT smoking d/w pt and her family        Given the lung findings will have to continue antibiotic therapy anticipate least 2 more weeks of duration of treatment    repeat sputum testing to see if Pseudomonas is clearing out on the current therapy-sputum repeat Normal fabiana noted      Now transferred to ICU secondary to massive epistaxis versus was  intubated on the ventilator status post bronchoscopy      Extubated on 8/4/22 and on Venturi mask has Rhinorocket in place ENT notes reviewed      Still has mild epistaxis noted better controlled ENT is following closely and the current antibiotics will provide sinus coverage     May repeat CT chest before d/c is she stays longer as in patient       Labs, Microbiology, Radiology and all the pertinent results from current hospitalization and  care every where were reviewed  by me as a part of the evaluation   Plan:   Cont  IV Meropenem 1 gm q 8 HR X stop date  x  8/27  2. Can cont   Tobramycin Neb   3 . Picc line in place   4. OPAT d/w pt  5 QUIT smoking  6. Will repeat CT chest as in patient if she stays longer in house  7 Cont supportive care  8. CT images reviewed see above    9. Risk for progression  10 on o Venturi mask  11. Repeat sputum clearing up  12. Robby DONE   13, ENT following for massive Epistaxis - controlled with Rhino rocket  off blood thinners for now   14. oral flUCONAZOLE for yeast infection           Discussed with patient/Family and Nursing   Risk of Complications/Morbidity: High      Illness(es)/ Infection present that pose threat to bodily function.    There is potential for severe exacerbation of

## 2022-08-06 NOTE — PROGRESS NOTES
Pulmonary Progress Note    CC:  Follow up epistaxis, pneumonia, ventilator    Subjective:  Back on pressors   Hb is >7 and stable  No SOB  Nose is still bleeding    ROS  Nose still bleeding  Denies being SOB      Intake/Output Summary (Last 24 hours) at 8/6/2022 0732  Last data filed at 8/6/2022 0727  Gross per 24 hour   Intake 281.86 ml   Output 1160 ml   Net -878.14 ml           PHYSICAL EXAM:  Blood pressure (!) 119/59, pulse (!) 102, temperature 98 °F (36.7 °C), temperature source Axillary, resp. rate 21, height 5' 4\" (1.626 m), weight 131 lb (59.4 kg), SpO2 90 %, not currently breastfeeding.'  Gen: Chronically ill  Eyes: PERRL. No sclera icterus. No conjunctival injection. ENT: No discharge. Pharynx with NRB mask  RIght nare with rhino rocket with gauze over nares as well   Neck: Trachea midline. No obvious mass. Resp: Rhonchi  CV: Regular rate. Regular rhythm. No murmur or rub. GI: Non-tender. Non-distended. No hernia. Skin: Warm, dry, normal texture and turgor. No nodule on exposed extremities. Lymph: No cervical LAD. No supraclavicular LAD. M/S: No cyanosis. No clubbing. No joint deformity.     Neuro: Awake and alert   Ext:   no edema    Medications:    Scheduled Meds:   magnesium sulfate  1,000 mg IntraVENous Once    magnesium sulfate        ipratropium-albuterol  1 ampule Inhalation Q4H    fluconazole  100 mg Oral Daily    [Held by provider] enoxaparin  40 mg SubCUTAneous Daily    meropenem  1,000 mg IntraVENous Q8H    tobramycin (PF)  300 mg Nebulization BID    [Held by provider] aspirin  81 mg Oral Daily    nystatin  5 mL Oral 4x Daily    [Held by provider] prasugrel  10 mg Oral Daily    sodium chloride flush  5-40 mL IntraVENous 2 times per day    atorvastatin  40 mg Oral Nightly    tiZANidine  4 mg Oral Nightly    gabapentin  600 mg Oral BID    DULoxetine  60 mg Oral BID    mometasone-formoterol  2 puff Inhalation BID       Continuous Infusions:   norepinephrine 2 mcg/min (08/06/22 0645) sodium chloride 5 mL/hr at 22    dextrose         PRN Meds:  potassium chloride, sodium chloride, sodium chloride flush, sodium chloride, acetaminophen, perflutren lipid microspheres, traZODone, ipratropium-albuterol, cetirizine, albuterol sulfate HFA, glucose, dextrose bolus **OR** dextrose bolus, glucagon (rDNA), dextrose, ondansetron, polyethylene glycol    Labs:  CBC:   Recent Labs     229   WBC 9.4 8.0 7.8   HGB 7.9* 7.4* 7.8*   HCT 23.5* 22.1* 23.2*   MCV 91.0 90.5 90.9   * 476* 515*       BMP:   Recent Labs     22   * 133* 138   K 4.3 3.9 3.8    97* 98*   CO2 25 30 33*   PHOS 3.2 2.7 3.3   BUN 12 12 8   CREATININE <0.5* <0.5* <0.5*       LIVER PROFILE: No results for input(s): AST, ALT, LIPASE, BILIDIR, BILITOT, ALKPHOS in the last 72 hours. Invalid input(s): AMYLASE,  ALB  PT/INR: No results for input(s): PROTIME, INR in the last 72 hours. APTT: No results for input(s): APTT in the last 72 hours. UA:No results for input(s): NITRITE, COLORU, PHUR, LABCAST, WBCUA, RBCUA, MUCUS, TRICHOMONAS, YEAST, BACTERIA, CLARITYU, SPECGRAV, LEUKOCYTESUR, UROBILINOGEN, BILIRUBINUR, BLOODU, GLUCOSEU, AMORPHOUS in the last 72 hours. Invalid input(s): Margie Lemus  No results for input(s): PH, PCO2, PO2 in the last 72 hours. Films:  Chest imaging reports were reviewed and imaging was reviewed by me and showed no new films     AB.//    Cultures:  Sputum:  pseudomonas, culture on  was negative     I reviewed the labs and images listed above    Assessment/Plan:   Acute Respiratory Insufficiency due to epistaxis/airway compromise, extubated on   Titrate oxygen for saturations greater than or equal to 90%  Lasix 20 mg once  CXR in am   Massive Epistaxis s/p Rhinorocket  ENT following. Rhinorocket in place.  Defer to ENT when to remove it  Acute Blood Loss Anemia s/p two units of blood Goal Hb is >7  No chemical prophylaxis   Hypotension vs true shock (hemorrhagic vs septic vs other)  Levophed for MAP of 60  Necrotizing Pneumonia due to pseudomonas   Merrem, Arnie per ID  Severe COPD  Duonebs q 4 hours   Dulera q 12       DVT prophylaxis  SCD    Speech evaluation before starting diet    Critical care time of 31 minutes     Meera Rm, DO  [facility]

## 2022-08-06 NOTE — PROGRESS NOTES
[facility] [address] [facility]  Progress note      Patient Name: [address] [place] [address] Record Number:  [id number]  Primary Care Physician:  HECTOR Can CNP  Date of Consultation: 8/6/2022    Interval History  Has had some intermittent bleeding from right side of nose per nursing. PHYSICAL EXAM  Blood clot in right nasal cavity; rhinorocket still in place          ASSESSMENT/PLAN  Epistaxis-will leave rhinorocket given intermittent bleeding.  -would like to avoid operative intervention as she would be high risk for general anesthesia, but if the bleeding continues or worsens, would have to consider. I have performed a head and neck physical exam personally or was physically present during the key or critical portions of the service. This note was generated completely or in part utilizing Dragon dictation speech recognition software. Occasionally, words are mistranscribed and despite editing, the text may contain inaccuracies due to incorrect word recognition. If further clarification is needed please contact the office at [phone].

## 2022-08-06 NOTE — PROGRESS NOTES
Patient continues on 4 of levophed. When titrated to 3 patient BP drops to 70s. Made Dr. Dali Gamez aware of morning labs. New orders for electrolye replacement.

## 2022-08-06 NOTE — PROGRESS NOTES
Made Dr. Lenin Rico aware of clot on right side of nose and changed dressing once over night. Per Dr. Sandra mcdonald will stay in one more day.

## 2022-08-07 ENCOUNTER — APPOINTMENT (OUTPATIENT)
Dept: GENERAL RADIOLOGY | Age: 77
DRG: 246 | End: 2022-08-07
Payer: MEDICARE

## 2022-08-07 LAB
ANION GAP SERPL CALCULATED.3IONS-SCNC: 4 MMOL/L (ref 3–16)
BASOPHILS ABSOLUTE: 0.1 K/UL (ref 0–0.2)
BASOPHILS RELATIVE PERCENT: 1 %
BUN BLDV-MCNC: 10 MG/DL (ref 7–20)
CALCIUM SERPL-MCNC: 7.9 MG/DL (ref 8.3–10.6)
CHLORIDE BLD-SCNC: 97 MMOL/L (ref 99–110)
CO2: 37 MMOL/L (ref 21–32)
CREAT SERPL-MCNC: <0.5 MG/DL (ref 0.6–1.2)
EOSINOPHILS ABSOLUTE: 0.2 K/UL (ref 0–0.6)
EOSINOPHILS RELATIVE PERCENT: 3.2 %
GFR AFRICAN AMERICAN: >60
GFR NON-AFRICAN AMERICAN: >60
GLUCOSE BLD-MCNC: 123 MG/DL (ref 70–99)
HCT VFR BLD CALC: 21 % (ref 36–48)
HEMOGLOBIN: 7.2 G/DL (ref 12–16)
LYMPHOCYTES ABSOLUTE: 1.1 K/UL (ref 1–5.1)
LYMPHOCYTES RELATIVE PERCENT: 18.4 %
MCH RBC QN AUTO: 30.2 PG (ref 26–34)
MCHC RBC AUTO-ENTMCNC: 34.2 G/DL (ref 31–36)
MCV RBC AUTO: 88.3 FL (ref 80–100)
MONOCYTES ABSOLUTE: 1 K/UL (ref 0–1.3)
MONOCYTES RELATIVE PERCENT: 17.7 %
NEUTROPHILS ABSOLUTE: 3.5 K/UL (ref 1.7–7.7)
NEUTROPHILS RELATIVE PERCENT: 59.7 %
PDW BLD-RTO: 14.7 % (ref 12.4–15.4)
PLATELET # BLD: 473 K/UL (ref 135–450)
PMV BLD AUTO: 6.7 FL (ref 5–10.5)
POTASSIUM REFLEX MAGNESIUM: 3.7 MMOL/L (ref 3.5–5.1)
RBC # BLD: 2.37 M/UL (ref 4–5.2)
SODIUM BLD-SCNC: 138 MMOL/L (ref 136–145)
WBC # BLD: 5.9 K/UL (ref 4–11)

## 2022-08-07 PROCEDURE — 85025 COMPLETE CBC W/AUTO DIFF WBC: CPT

## 2022-08-07 PROCEDURE — 99231 SBSQ HOSP IP/OBS SF/LOW 25: CPT | Performed by: OTOLARYNGOLOGY

## 2022-08-07 PROCEDURE — 6370000000 HC RX 637 (ALT 250 FOR IP): Performed by: INTERNAL MEDICINE

## 2022-08-07 PROCEDURE — 94640 AIRWAY INHALATION TREATMENT: CPT

## 2022-08-07 PROCEDURE — 6360000002 HC RX W HCPCS: Performed by: INTERNAL MEDICINE

## 2022-08-07 PROCEDURE — 6370000000 HC RX 637 (ALT 250 FOR IP): Performed by: NURSE PRACTITIONER

## 2022-08-07 PROCEDURE — 2000000000 HC ICU R&B

## 2022-08-07 PROCEDURE — 2580000003 HC RX 258: Performed by: INTERNAL MEDICINE

## 2022-08-07 PROCEDURE — 99291 CRITICAL CARE FIRST HOUR: CPT | Performed by: INTERNAL MEDICINE

## 2022-08-07 PROCEDURE — 71045 X-RAY EXAM CHEST 1 VIEW: CPT

## 2022-08-07 PROCEDURE — 36592 COLLECT BLOOD FROM PICC: CPT

## 2022-08-07 PROCEDURE — 2700000000 HC OXYGEN THERAPY PER DAY

## 2022-08-07 PROCEDURE — 94761 N-INVAS EAR/PLS OXIMETRY MLT: CPT

## 2022-08-07 PROCEDURE — 80048 BASIC METABOLIC PNL TOTAL CA: CPT

## 2022-08-07 PROCEDURE — 99233 SBSQ HOSP IP/OBS HIGH 50: CPT | Performed by: INTERNAL MEDICINE

## 2022-08-07 RX ORDER — FUROSEMIDE 10 MG/ML
20 INJECTION INTRAMUSCULAR; INTRAVENOUS ONCE
Status: COMPLETED | OUTPATIENT
Start: 2022-08-07 | End: 2022-08-07

## 2022-08-07 RX ORDER — SODIUM CHLORIDE FOR INHALATION 3 %
15 VIAL, NEBULIZER (ML) INHALATION
Status: DISCONTINUED | OUTPATIENT
Start: 2022-08-07 | End: 2022-08-13

## 2022-08-07 RX ADMIN — FLUCONAZOLE 100 MG: 100 TABLET ORAL at 08:44

## 2022-08-07 RX ADMIN — NYSTATIN 500000 UNITS: 100000 SUSPENSION ORAL at 14:04

## 2022-08-07 RX ADMIN — SODIUM CHLORIDE, PRESERVATIVE FREE 10 ML: 5 INJECTION INTRAVENOUS at 20:13

## 2022-08-07 RX ADMIN — SODIUM CHLORIDE SOLN NEBU 3% 15 ML: 3 NEBU SOLN at 11:50

## 2022-08-07 RX ADMIN — MEROPENEM 1000 MG: 1 INJECTION, POWDER, FOR SOLUTION INTRAVENOUS at 05:49

## 2022-08-07 RX ADMIN — MEROPENEM 1000 MG: 1 INJECTION, POWDER, FOR SOLUTION INTRAVENOUS at 14:07

## 2022-08-07 RX ADMIN — IPRATROPIUM BROMIDE AND ALBUTEROL SULFATE 1 AMPULE: .5; 3 SOLUTION RESPIRATORY (INHALATION) at 16:08

## 2022-08-07 RX ADMIN — MEROPENEM 1000 MG: 1 INJECTION, POWDER, FOR SOLUTION INTRAVENOUS at 20:20

## 2022-08-07 RX ADMIN — ONDANSETRON 4 MG: 2 INJECTION INTRAMUSCULAR; INTRAVENOUS at 20:15

## 2022-08-07 RX ADMIN — SODIUM CHLORIDE SOLN NEBU 3% 15 ML: 3 NEBU SOLN at 09:19

## 2022-08-07 RX ADMIN — TOBRAMYCIN 300 MG: 300 SOLUTION RESPIRATORY (INHALATION) at 08:01

## 2022-08-07 RX ADMIN — SODIUM CHLORIDE, PRESERVATIVE FREE 10 ML: 5 INJECTION INTRAVENOUS at 08:44

## 2022-08-07 RX ADMIN — ACETAMINOPHEN 650 MG: 325 TABLET ORAL at 14:08

## 2022-08-07 RX ADMIN — ATORVASTATIN CALCIUM 40 MG: 40 TABLET, FILM COATED ORAL at 20:12

## 2022-08-07 RX ADMIN — SODIUM CHLORIDE SOLN NEBU 3% 15 ML: 3 NEBU SOLN at 16:09

## 2022-08-07 RX ADMIN — DULOXETINE HYDROCHLORIDE 60 MG: 60 CAPSULE, DELAYED RELEASE ORAL at 20:12

## 2022-08-07 RX ADMIN — SODIUM CHLORIDE SOLN NEBU 3% 15 ML: 3 NEBU SOLN at 19:35

## 2022-08-07 RX ADMIN — IPRATROPIUM BROMIDE AND ALBUTEROL SULFATE 1 AMPULE: .5; 3 SOLUTION RESPIRATORY (INHALATION) at 07:55

## 2022-08-07 RX ADMIN — FUROSEMIDE 20 MG: 10 INJECTION, SOLUTION INTRAMUSCULAR; INTRAVENOUS at 08:44

## 2022-08-07 RX ADMIN — DULOXETINE HYDROCHLORIDE 60 MG: 60 CAPSULE, DELAYED RELEASE ORAL at 08:44

## 2022-08-07 RX ADMIN — MOMETASONE FUROATE AND FORMOTEROL FUMARATE DIHYDRATE 2 PUFF: 200; 5 AEROSOL RESPIRATORY (INHALATION) at 07:55

## 2022-08-07 RX ADMIN — GABAPENTIN 600 MG: 300 CAPSULE ORAL at 08:44

## 2022-08-07 RX ADMIN — NYSTATIN 500000 UNITS: 100000 SUSPENSION ORAL at 20:12

## 2022-08-07 RX ADMIN — NYSTATIN 500000 UNITS: 100000 SUSPENSION ORAL at 08:44

## 2022-08-07 RX ADMIN — TRAZODONE HYDROCHLORIDE 100 MG: 100 TABLET ORAL at 20:12

## 2022-08-07 RX ADMIN — NYSTATIN 500000 UNITS: 100000 SUSPENSION ORAL at 17:17

## 2022-08-07 RX ADMIN — IPRATROPIUM BROMIDE AND ALBUTEROL SULFATE 1 AMPULE: .5; 3 SOLUTION RESPIRATORY (INHALATION) at 19:35

## 2022-08-07 RX ADMIN — GABAPENTIN 600 MG: 300 CAPSULE ORAL at 20:12

## 2022-08-07 RX ADMIN — MOMETASONE FUROATE AND FORMOTEROL FUMARATE DIHYDRATE 2 PUFF: 200; 5 AEROSOL RESPIRATORY (INHALATION) at 19:35

## 2022-08-07 RX ADMIN — IPRATROPIUM BROMIDE AND ALBUTEROL SULFATE 1 AMPULE: .5; 3 SOLUTION RESPIRATORY (INHALATION) at 11:50

## 2022-08-07 RX ADMIN — IPRATROPIUM BROMIDE AND ALBUTEROL SULFATE 1 AMPULE: .5; 3 SOLUTION RESPIRATORY (INHALATION) at 03:55

## 2022-08-07 RX ADMIN — SODIUM CHLORIDE: 9 INJECTION, SOLUTION INTRAVENOUS at 08:50

## 2022-08-07 RX ADMIN — TOBRAMYCIN 300 MG: 300 SOLUTION RESPIRATORY (INHALATION) at 19:35

## 2022-08-07 RX ADMIN — TIZANIDINE 4 MG: 4 TABLET ORAL at 20:12

## 2022-08-07 ASSESSMENT — PAIN SCALES - WONG BAKER
WONGBAKER_NUMERICALRESPONSE: 0

## 2022-08-07 ASSESSMENT — PAIN DESCRIPTION - LOCATION: LOCATION: BACK

## 2022-08-07 ASSESSMENT — PAIN DESCRIPTION - FREQUENCY: FREQUENCY: INTERMITTENT

## 2022-08-07 ASSESSMENT — PAIN SCALES - GENERAL
PAINLEVEL_OUTOF10: 0
PAINLEVEL_OUTOF10: 8
PAINLEVEL_OUTOF10: 0

## 2022-08-07 ASSESSMENT — PAIN DESCRIPTION - ONSET: ONSET: ON-GOING

## 2022-08-07 ASSESSMENT — PAIN DESCRIPTION - ORIENTATION: ORIENTATION: LOWER

## 2022-08-07 ASSESSMENT — PAIN - FUNCTIONAL ASSESSMENT: PAIN_FUNCTIONAL_ASSESSMENT: PREVENTS OR INTERFERES SOME ACTIVE ACTIVITIES AND ADLS

## 2022-08-07 ASSESSMENT — PAIN DESCRIPTION - PAIN TYPE: TYPE: CHRONIC PAIN

## 2022-08-07 ASSESSMENT — PAIN DESCRIPTION - DESCRIPTORS: DESCRIPTORS: ACHING

## 2022-08-07 NOTE — PROGRESS NOTES
Hospitalist Progress Note      PCP: HECTOR Stringer - CNP    Date of Admission: 7/18/2022    Chief Complaint: respiratory distress    Hospital Course:      Subjective:   Hemoptysis but on endoscopy turns out to have been epistaxis. Rhinorocket taken out  No epistaxis  Short lived period on vent- now extubated.   No F/C  Some SOB  Still on venturi mask  Feeling better  No new symptoms        Medications:  Reviewed    Infusion Medications    norepinephrine Stopped (08/07/22 0845)    sodium chloride 10 mL/hr at 08/07/22 0850    dextrose       Scheduled Medications    sodium chloride (Inhalant)  15 mL Nebulization Q4H While awake    ipratropium-albuterol  1 ampule Inhalation Q4H    fluconazole  100 mg Oral Daily    [Held by provider] enoxaparin  40 mg SubCUTAneous Daily    meropenem  1,000 mg IntraVENous Q8H    tobramycin (PF)  300 mg Nebulization BID    [Held by provider] aspirin  81 mg Oral Daily    nystatin  5 mL Oral 4x Daily    [Held by provider] prasugrel  10 mg Oral Daily    sodium chloride flush  5-40 mL IntraVENous 2 times per day    atorvastatin  40 mg Oral Nightly    tiZANidine  4 mg Oral Nightly    gabapentin  600 mg Oral BID    DULoxetine  60 mg Oral BID    mometasone-formoterol  2 puff Inhalation BID     PRN Meds: potassium chloride, sodium chloride, sodium chloride flush, sodium chloride, acetaminophen, perflutren lipid microspheres, traZODone, ipratropium-albuterol, cetirizine, albuterol sulfate HFA, glucose, dextrose bolus **OR** dextrose bolus, glucagon (rDNA), dextrose, ondansetron, polyethylene glycol      Intake/Output Summary (Last 24 hours) at 8/7/2022 1506  Last data filed at 8/7/2022 1200  Gross per 24 hour   Intake 433.75 ml   Output 1200 ml   Net -766.25 ml         Exam:    /61   Pulse (!) 108   Temp 98.2 °F (36.8 °C) (Axillary)   Resp 26   Ht 5' 4\" (1.626 m)   Wt 130 lb 11.7 oz (59.3 kg)   SpO2 92%   BMI 22.44 kg/m²     General appearance: Anxious+, appears stated age and cooperative. HEENT: Pupils equal, round, and reactive to light. Conjunctivae/corneas clear. Neck: Supple, with full range of motion. No jugular venous distention. Trachea midline. Respiratory:  Comfortably on Bipap. Crackles bibasally. Cardiovascular: Regular rate and rhythm with normal S1/S2 without murmurs, rubs or gallops. Abdomen: Soft, non-tender, non-distended with normal bowel sounds. Musculoskeletal: No clubbing, cyanosis or edema bilaterally. Full range of motion without deformity. Skin: Skin color, texture, turgor normal.  No rashes or lesions. Neurologic:  Neurovascularly intact without any focal sensory/motor deficits. Cranial nerves: II-XII intact, grossly non-focal.  Psychiatric: Alert and oriented, thought content appropriate, normal insight  Capillary Refill: Brisk,< 3 seconds   Peripheral Pulses: +2 palpable, equal bilaterally       Labs:   Recent Labs     08/05/22 0408 08/06/22  0419 08/07/22  0430   WBC 8.0 7.8 5.9   HGB 7.4* 7.8* 7.2*   HCT 22.1* 23.2* 21.0*   * 515* 473*       Recent Labs     08/05/22 0408 08/06/22  0419 08/07/22  0430   * 138 138   K 3.9 3.8 3.7   CL 97* 98* 97*   CO2 30 33* 37*   BUN 12 8 10   CREATININE <0.5* <0.5* <0.5*   CALCIUM 8.0* 8.2* 7.9*   PHOS 2.7 3.3  --        No results for input(s): AST, ALT, BILIDIR, BILITOT, ALKPHOS in the last 72 hours. No results for input(s): INR in the last 72 hours. No results for input(s): Priya Mould in the last 72 hours.     Urinalysis:      Lab Results   Component Value Date/Time    NITRU Negative 07/22/2022 06:31 AM    WBCUA 2 07/22/2022 06:31 AM    BACTERIA None Seen 07/22/2022 06:31 AM    RBCUA 5 07/22/2022 06:31 AM    BLOODU Negative 07/22/2022 06:31 AM    SPECGRAV 1.049 07/22/2022 06:31 AM    GLUCOSEU Negative 07/22/2022 06:31 AM       Radiology:  XR CHEST PORTABLE   Final Result   Increasing opacity throughout portions of the right lung, which can reflect   combination of airspace disease, atelectasis, and pleural effusion. XR CHEST PORTABLE   Final Result   Endotracheal tube with the tip approximately 1 cm above the snog. Consider   retraction by approximately 3.5 cm. Enteric tube with the tip and the side   hole below the diaphragm overlying the left upper abdomen, likely within the   fundus of the stomach. Redemonstration of multifocal pneumonia affecting the right lung. Small   right pleural effusion. XR CHEST PORTABLE   Final Result   Unchanged multifocal right-sided pneumonia. XR CHEST PORTABLE   Final Result   Stable multifocal airspace disease in the right lung with probable upper lobe   abscess. XR CHEST PORTABLE   Final Result   Stable multifocal airspace disease in the right lung, including probable   pulmonary abscess in the right upper lobe. CT CHEST WO CONTRAST   Final Result   1. Mixed consolidative and ground-glass opacities as well as interstitial   thickening throughout the majority of the right lung compatible with   pneumonia. 2. There is a cavity in the right upper lobe demonstrating an air-fluid level   measuring up to 9.7 cm concerning for abscess formation. 3. Trace right pleural effusion. 4. Prominent and enlarged mediastinal lymph nodes, likely reactive. 5. Stable 6 mm left lower lobe pulmonary nodule. RECOMMENDATIONS:   Fleischner Society guidelines for follow-up and management of incidentally   detected pulmonary nodules:      Single Solid Nodule:      Nodule size less than 6 mm   In a low-risk patient, no routine follow-up. In a high-risk patient, optional CT at 12 months. Nodule size equals 6-8 mm   In a low-risk patient, CT at 6-12 months, then consider CT at 18-24 months. In a high-risk patient, CT at 6-12 months, then CT at 18-24 months. Nodule size greater than 8 mm         In a low-risk patient, consider CT at 3 months, PET/CT, or tissue sampling.       In a high-risk patient, consider CT at 3 months, PET/CT, or tissue sampling. Multiple Solid Nodules:      Nodule size less than 6 mm   In a low-risk patient, no routine follow-up. In a high-risk patient, optional CT at 12 months. Nodule size equals 6-8 mm   In a low-risk patient, CT at 3-6 months, then consider CT at 18-24 months. In a high-risk patient, CT at 3-6 months, then CT at 18-24 months. Nodule size greater than 8 mm   In a low-risk patient, CT at 3-6 months, then consider CT at 18-24 months. In a high-risk patient, CT at 3-6 months, then CT at 18-24 months. - Low risk patients include individuals with minimal or absent history of   smoking and other known risk factors. - High risk patients include individuals with a history or smoking or known   risk factors. Radiology 2017 http://pubs. rsna.org/doi/full/10.1148/radiol. 0245681171         XR CHEST PORTABLE   Final Result   1. Increased pneumonia throughout the right lung remaining most prominent in   the right upper lobe. 2. Emphysema better demonstrated on CT. 3. Possible trace right pleural effusion.          XR CHEST PORTABLE   Final Result   Improved aeration of the right chest with persistent consolidation in the mid   to upper right chest.         CT CHEST WO CONTRAST    (Results Pending)           Assessment/Plan:    Active Hospital Problems    Diagnosis Date Noted    Acute respiratory insufficiency [R06.89] 08/04/2022     Priority: Medium    Acute blood loss anemia [D62] 08/04/2022     Priority: Medium    Hypotension due to drugs [I95.2] 08/04/2022     Priority: Medium    Massive hemoptysis [R04.2] 08/03/2022     Priority: Medium    Epistaxis [R04.0] 08/03/2022     Priority: Medium    Peripherally inserted central catheter (PICC) in place [Z45.2] 07/31/2022     Priority: Medium    NSTEMI (non-ST elevated myocardial infarction) ([facility] [I21.4] 07/26/2022     Priority: Medium    Necrotizing pneumonia ([facility] [J85.0] 07/26/2022     Priority: Medium    Abscess of upper lobe of right lung with pneumonia ([facility] [J85.1] 07/26/2022     Priority: Medium    Pseudomonas respiratory infection [J98.8, B96.5] 07/26/2022     Priority: Medium    Abnormal CT of the chest [R93.89] 07/26/2022     Priority: Medium    Acute on chronic respiratory failure with hypoxia and hypercapnia (HCC) [J96.21, J96.22] 07/18/2022     Priority: Medium    Abnormal EKG [R94.31] 07/18/2022     Priority: Medium    Chronic obstructive pulmonary disease ([facility] [J44.9]      Priority: Medium    Pneumonia of right lung due to infectious organism [J18.9] 07/03/2022     Priority: Medium    COPD, severe ([facility] [J44.9] 02/24/2017    Chronic pain syndrome [G89.4] 03/14/2016    Hypercholesteremia [E78.00] 11/04/2013    Fibromyalgia [M79.7]     Smoking [F17.200] 08/27/2011     Acute on chronic hypoxic respiratory failure     2. 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 in situ   -CT surgery consulted:  would need pneumonectomy for which she is a poor candidate --> medical therapy only  - Respiratory sputum culture repeated on 7/30 without pseudomonnas  -  3. Epistaxis- resolved. Right nare Rhinorocket Dcd.  -DC lovenox.  -Keep ASA and prasugrel going  -HnH q8  -Transfuse PRBCs if hb is below 7.0  -Transfer out of ICU in next 24hrs        4. Critical coronary artery disease single-vessel  Status post PCI to left circumflex by Dr Regla Ding on this admission  with stent  Now on antiplatelet therapy     5. Anemia, AOCD. Now has hemoptysis. FU iron studies TIBC ferritin % and reticulocyte count  No indication to transfusion yet       COPD, severe ([facility] - without acute exacerbation. Will provide Nebulizer treatments as needed and continue home medications. Patient will be monitored closely, and deep breathing and coughing will be encouraged while awake. Hyperlipidemia - No current evidence of Rhabdomyolysis or other adverse effects.  Continue statin therapy while in the hospital     Chronic pain syndrome -continue her home pain med regimen     Fibromyalgia - provide supportive care    DVT Prophylaxis: heparin  Diet: ADULT DIET; Dysphagia - Pureed; Moderately Thick (Honey)  Code Status: Full Code    PT/OT Eval Status: evaluating    Dispo; Transfer out of ICU.  Can be Dcd in am.    Hai Cronin MD

## 2022-08-07 NOTE — PROGRESS NOTES
Pulmonary Progress Note    CC:  Follow up epistaxis, pneumonia, ventilator    Subjective:  Higher oxygen requirements   More productive cough. Desaturating more too. Rhinorocket in place    ROS  Some SOB  More mucus       Intake/Output Summary (Last 24 hours) at 8/7/2022 0769  Last data filed at 8/7/2022 0553  Gross per 24 hour   Intake 345.13 ml   Output 1395 ml   Net -1049.87 ml           PHYSICAL EXAM:  Blood pressure 126/73, pulse 91, temperature 98.3 °F (36.8 °C), temperature source Axillary, resp. rate 20, height 5' 4\" (1.626 m), weight 130 lb 11.7 oz (59.3 kg), SpO2 100 %, not currently breastfeeding.'  Gen: Chronically ill  Eyes: PERRL. No sclera icterus. No conjunctival injection. ENT: No discharge. Pharynx with NRB mask  RIght nare with rhino rocket with gauze over nares as well   Neck: Trachea midline. No obvious mass. Resp: Rhonchi in right lung, left lung clearer  CV: Irregular. No murmur or rub. GI: Non-tender. Non-distended. No hernia. Skin: Warm, dry, normal texture and turgor. No nodule on exposed extremities. Lymph: No cervical LAD. No supraclavicular LAD. M/S: No cyanosis. No clubbing. No joint deformity.     Neuro: Awake and alert   Ext:   no edema    Medications:    Scheduled Meds:   ipratropium-albuterol  1 ampule Inhalation Q4H    fluconazole  100 mg Oral Daily    [Held by provider] enoxaparin  40 mg SubCUTAneous Daily    meropenem  1,000 mg IntraVENous Q8H    tobramycin (PF)  300 mg Nebulization BID    [Held by provider] aspirin  81 mg Oral Daily    nystatin  5 mL Oral 4x Daily    [Held by provider] prasugrel  10 mg Oral Daily    sodium chloride flush  5-40 mL IntraVENous 2 times per day    atorvastatin  40 mg Oral Nightly    tiZANidine  4 mg Oral Nightly    gabapentin  600 mg Oral BID    DULoxetine  60 mg Oral BID    mometasone-formoterol  2 puff Inhalation BID       Continuous Infusions:   norepinephrine 2.133 mcg/min (08/07/22 0004)    sodium chloride 5 mL/hr at 08/05/22 2000    dextrose         PRN Meds:  potassium chloride, sodium chloride, sodium chloride flush, sodium chloride, acetaminophen, perflutren lipid microspheres, traZODone, ipratropium-albuterol, cetirizine, albuterol sulfate HFA, glucose, dextrose bolus **OR** dextrose bolus, glucagon (rDNA), dextrose, ondansetron, polyethylene glycol    Labs:  CBC:   Recent Labs     22  0430   WBC 8.0 7.8 5.9   HGB 7.4* 7.8* 7.2*   HCT 22.1* 23.2* 21.0*   MCV 90.5 90.9 88.3   * 515* 473*       BMP:   Recent Labs     22  0430   * 138 138   K 3.9 3.8 3.7   CL 97* 98* 97*   CO2 30 33* 37*   PHOS 2.7 3.3  --    BUN 12 8 10   CREATININE <0.5* <0.5* <0.5*       LIVER PROFILE: No results for input(s): AST, ALT, LIPASE, BILIDIR, BILITOT, ALKPHOS in the last 72 hours. Invalid input(s): AMYLASE,  ALB  PT/INR: No results for input(s): PROTIME, INR in the last 72 hours. APTT: No results for input(s): APTT in the last 72 hours. UA:No results for input(s): NITRITE, COLORU, PHUR, LABCAST, WBCUA, RBCUA, MUCUS, TRICHOMONAS, YEAST, BACTERIA, CLARITYU, SPECGRAV, LEUKOCYTESUR, UROBILINOGEN, BILIRUBINUR, BLOODU, GLUCOSEU, AMORPHOUS in the last 72 hours. Invalid input(s): Lorella Poll  No results for input(s): PH, PCO2, PO2 in the last 72 hours.         Films:  Chest imaging reports were reviewed and imaging was reviewed by me and showed increase density in the RUL with possible accumulation of pleura effusion with vascular congestion     AB.37//67    Cultures:  Sputum:  pseudomonas, culture on  was negative     I reviewed the labs and images listed above    Assessment/Plan:   Acute Respiratory Insufficiency due to epistaxis/airway compromise, extubated on   Titrate oxygen for saturations greater than or equal to 90%  Lasix 20 mg again today as chest imaging suggests some degree of volume overload among other things   Massive Epistaxis s/p Rhinorocket  ENT following. Rhinorocket in place. Defer to ENT when to remove it  Acute Blood Loss Anemia s/p two units of blood   Goal Hb is >7. Hb continues to trend down  No chemical prophylaxis   Hypotension vs true shock (hemorrhagic vs septic vs other)  Levophed for MAP of 60.   Has required this on and off  Necrotizing Pneumonia due to pseudomonas   Merrem per ID  Severe COPD  Duonebs q 4 hours   Dulera q 12   Add saline nebs to help with pulmonary toilet       DVT prophylaxis  SCD    Critical care time of 31 minutes     Bruce Stephens,   [facility]

## 2022-08-07 NOTE — PROGRESS NOTES
Infectious Disease Follow up Notes  Admit Date: 7/18/2022  Hospital Day: 21    Antibiotics :   IV Meropenem  Inhaled Tobramycin     CHIEF COMPLAINT:      Rt UL lung abscess  Severe Necrotizing PNA  Pseudomonas PNA  Hypoxic resp failure  Epistaxis       Subjective interval History :  78 y. o.woman with a past medical history of COPD, depression, chronic hypoxic respiratory failure now on   4 L of oxygen via nasal cannula, diabetes recent admission for COPD exacerbation and pneumonia diagnosed to have Pseudomonas based on sputum studies. She was on IV cefepime transition to oral levofloxacin on discharge. She was sent to [address] rehab facility and now admitted because of worsening shortness of breath increased sputum production. Sputum culture again on this admission positive for Pseudomonas with some resistance pattern, it is now become resistant to quinolones. CT chest on this admission with progressive pneumonia right upper lobe cavitary lesion possible lung abscess. Extensive consolidation  see images -  This is a new finding on the CT chest compared to her previous CT chest on 6/20/22. Given the necrotizing pneumonia with Pseudomonas infection and lung abscess formation we are consulted for recommendations. Interval History : Remains in ICU using Venturi mask for oxygenation. Rhino Rocket was removed by ENT today no further bleeding noted tolerating antibiotic therapy okay still has ongoing some cough. Chest x-ray from this morning indicates increasing opacity throughout the right lung concerning for airspace disease versus atelectasis        Past Medical History:    Past Medical History:   Diagnosis Date    CAD (coronary artery disease) 07/19/2022    NSTEMI, PCI LCx    Chronic pain syndrome     Percocet.  Dr. Olivier Cota    Colorectal polyps     COPD (chronic obstructive pulmonary disease) ([facility] [place] [address]     CTS (carpal tunnel syndrome)     BILATERAL    DDD (degenerative disc disease), lumbar     Depression     Family hx of colon cancer     Fibromyalgia     Hyperlipemia     IBS (irritable bowel syndrome)     Lumbar spondylosis     New onset type 2 diabetes mellitus ([facility] [address] 02/03/2020    On home O2     4L    Pneumonia 07/2022    P.aer    Urticaria, chronic        Past Surgical History:    Past Surgical History:   Procedure Laterality Date    BRONCHOSCOPY N/A 8/3/2022    BRONCHOSCOPY performed by Angelia Holter, DO at [facility] Bilateral     CATARACT EXTRACTION      CERVICAL POLYP REMOVAL  09/2004    CORONARY ANGIOPLASTY WITH STENT PLACEMENT  07/19/2022    LCx 99. PCI/stent. INTRACAPSULAR CATARACT EXTRACTION Left 06/23/2020    Phacoemulsification with intraocular lens implant performed by Kimber Mckeon MD at [address] Right 07/14/2020    Phacoemulsification with intraocular lens implant performed by Kimber Mckeon MD at [address]      patient denies        Current Medications:    Outpatient Medications Marked as Taking for the 7/18/22 encounter [facility] Encounter)   Medication Sig Dispense Refill    aspirin 81 MG EC tablet Take 1 tablet by mouth in the morning. 30 tablet 3    prasugrel (EFFIENT) 10 MG TABS Take 1 tablet by mouth in the morning. 30 tablet 1       Allergies:  Patient has no known allergies.     Immunizations :   Immunization History   Administered Date(s) Administered    COVID-19, MODERNA BLUE border, Primary or Immunocompromised, (age 12y+), IM, 100 mcg/0.5mL 03/08/2021, 04/05/2021    COVID-19, MODERNA Booster BLUE border, (age 18y+), IM, 50mcg/0.25mL 12/01/2021    Influenza Vaccine, unspecified formulation 01/10/2017    Influenza, High Dose (Fluzone 65 yrs and older) 11/04/2013, 01/21/2016, 09/01/2017, 10/30/2018, 09/18/2020    Influenza, Quadv, adjuvanted, 65 yrs +, IM, PF (Fluad) 09/27/2021    Influenza, Triv, inactivated, subunit, adjuvanted, IM (Fluad 65 yrs and older) 09/13/2019    Pneumococcal Conjugate 13-valent ([id number]) 01/19/2015    Pneumococcal Conjugate Vaccine 01/10/2017    Pneumococcal Polysaccharide ([id number]) 10/12/2012    Tdap (Boostrix, Adacel) 07/17/2007    Zoster Recombinant (Shingrix) 11/21/2019       Social History:     Social History     Tobacco Use    Smoking status: Every Day     Packs/day: 1.00     Years: 55.00     Pack years: 55.00     Types: Cigarettes     Start date: 1/1/1962    Smokeless tobacco: Never    Tobacco comments:     almost ready to quit   Vaping Use    Vaping Use: Never used   Substance Use Topics    Alcohol use: No     Alcohol/week: 0.0 standard drinks    Drug use: No     Social History     Tobacco Use   Smoking Status Every Day    Packs/day: 1.00    Years: 55.00    Pack years: 55.00    Types: Cigarettes    Start date: 1/1/1962   Smokeless Tobacco Never   Tobacco Comments    almost ready to quit      Family History   Problem Relation Age of Onset    Cancer Father         COLON     Alzheimer's Disease Mother     Heart Disease Brother     Heart Failure Brother     Diabetes Daughter     Diabetes Daughter     Diabetes Daughter           REVIEW OF SYSTEMS:      Constitutional:  negative for fevers, chills, night sweats  Eyes:  negative for blurred vision, eye discharge, visual disturbance   HEENT:  negative for hearing loss, ear drainage,nasal congestion  Respiratory:  r cough,+  shortness of breath++ or hemoptysis   Cardiovascular:  negative for chest pain, palpitations, syncope  Gastrointestinal:  negative for nausea, vomiting, diarrhea, constipation, abdominal pain  Genitourinary:  negative for frequency, dysuria, urinary incontinence, hematuria  Hematologic/Lymphatic:  negative for easy bruising, bleeding and lymphadenopathy  Allergic/Immunologic:  negative for recurrent infections, angioedema, anaphylaxis   Endocrine:  negative for weight changes, polyuria, polydipsia and polyphagia  Musculoskeletal:  negative for joint  pain, swelling, decreased range of motion  Neurological:  negative for headaches, slurred speech, unilateral weakness  Psychiatric: negative for hallucinations,confusion,agitation.              PHYSICAL EXAM:      Vitals:    BP (!) 87/54   Pulse (!) 110   Temp 98.2 °F (36.8 °C) (Axillary)   Resp 22   Ht 5' 4\" (1.626 m)   Wt 130 lb 11.7 oz (59.3 kg)   SpO2 92%   BMI 22.44 kg/m²     General Appearance: awake and in some  acute distress, ++  pallor,Epistaxis Rhino rocket in place Venturi mask+    Skin: warm and dry, no rash or erythema  Head: normocephalic and atraumatic  Eyes: pupils equal, round, and reactive to light, conjunctivae normal  ENT: tympanic membrane, external ear and ear canal normal bilaterally, nose without deformity, nasal mucosa and turbinates normal without polyps  Neck: supple and non-tender without mass, no thyromegaly  no cervical lymphadenopathy  Pulmonary/Chest:  Rt UL coarse crepts+ +  wheezes, rales or rhonchi, normal air movement, in some respiratory distress  Cardiovascular: normal rate, regular rhythm, normal S1 and S2, no murmurs, rubs, clicks, or gallops, no carotid bruits  Abdomen: soft, non-tender, non-distended, normal bowel sounds, no masses or organomegaly  Extremities: no cyanosis, clubbing or edema  Musculoskeletal: normal range of motion, no joint swelling, deformity or tenderness  Integumentary: No rashes, no abnormal skin lesions, no petechiae  Neurologic: reflexes normal and symmetric, no cranial nerve deficit           Lines: PICC     Data Review:    CBC:   Lab Results   Component Value Date    WBC 5.9 08/07/2022    HGB 7.2 (L) 08/07/2022    HCT 21.0 (L) 08/07/2022    MCV 88.3 08/07/2022     (H) 08/07/2022     RENAL:   Lab Results   Component Value Date    CREATININE <0.5 (L) 08/07/2022    BUN 10 08/07/2022     08/07/2022    K 3.7 08/07/2022    CL 97 (L) 08/07/2022    CO2 37 (H) 08/07/2022     SED RATE: No results found for: SEDRATE  CK: No results found for: CKTOTAL  CRP: No results found for: CRP  Hepatic Function Panel:   Lab Results   Component Value Date/Time    ALKPHOS 88 07/18/2022 05:20 PM    ALT 65 07/18/2022 05:20 PM    AST 61 07/18/2022 05:20 PM    PROT 5.6 07/18/2022 05:20 PM    PROT 6.9 10/12/2012 11:00 AM    BILITOT 0.5 07/18/2022 05:20 PM    LABALBU 2.4 07/18/2022 05:20 PM     UA:  Lab Results   Component Value Date/Time    COLORU Yellow 07/22/2022 06:31 AM    CLARITYU Clear 07/22/2022 06:31 AM    GLUCOSEU Negative 07/22/2022 06:31 AM    BILIRUBINUR Negative 07/22/2022 06:31 AM    BILIRUBINUR neg 10/17/2019 11:50 AM    KETUA TRACE 07/22/2022 06:31 AM    SPECGRAV 1.049 07/22/2022 06:31 AM    BLOODU Negative 07/22/2022 06:31 AM    PHUR 6.5 07/22/2022 06:31 AM    PROTEINU 30 07/22/2022 06:31 AM    UROBILINOGEN 0.2 07/22/2022 06:31 AM    NITRU Negative 07/22/2022 06:31 AM    LEUKOCYTESUR Negative 07/22/2022 06:31 AM    LABMICR YES 07/22/2022 06:31 AM    URINETYPE NotGiven 07/22/2022 06:31 AM      Urine Microscopic:   Lab Results   Component Value Date/Time    LABCAST 10-20 Hyaline 01/10/2017 06:19 PM    BACTERIA None Seen 07/22/2022 06:31 AM    COMU see below 07/03/2022 03:11 PM    HYALCAST 2 07/22/2022 06:31 AM    WBCUA 2 07/22/2022 06:31 AM    RBCUA 5 07/22/2022 06:31 AM    EPIU 1 07/22/2022 06:31 AM     Urine Reflex to Culture:   Lab Results   Component Value Date/Time    URRFLXCULT Not Indicated 07/03/2022 03:11 PM         MICRO: cultures reviewed and updated by me   Blood Culture:          Culture, Respiratory [[id number]] (Abnormal)  Collected: 07/22/22 1200   Order Status: Completed Specimen: Sputum Expectorated Updated: 07/24/22 0801    CULTURE, RESPIRATORY Rare growth normal respiratory fabiana with Abnormal     Gram Stain Result No Epithelial Cells seen   3+ WBC's (Polymorphonuclear)   No organisms seen     Organism Pseudomonas aeruginosa Abnormal     CULTURE, RESPIRATORY Light growth   Narrative:     ORDER#: [id number]                          ORDERED BY: CIRA KEBEDE   SOURCE: Sputum Expectorated                COLLECTED:  07/22/22 12:00   ANTIBIOTICS AT GURWINDER.:                      RECEIVED :  07/22/22 12:22   Respiratory Culture [[id number]] (Abnormal)  Collected: 07/19/22 2030   Order Status: Completed Specimen: Sputum Expectorated Updated: 07/23/22 0748    CULTURE, RESPIRATORY Light growth normal respiratory fabiana with Abnormal     Gram Stain Result 2+ Gram positive cocci   2+ WBC's (Polymorphonuclear)   1+ Epithelial Cells     Organism Pseudomonas aeruginosa Abnormal     CULTURE, RESPIRATORY Light growth   Narrative:     ORDER#: [id number]                          ORDERED BY: FARZANA GAN   SOURCE: Sputum Expectorated                COLLECTED:  07/19/22 20:30   ANTIBIOTICS AT GURWINDER.:                      RECEIVED :  07/19/22 21:19   Culture, Blood 1 [[id number]] Collected: 07/18/22 1841   Order Status: Completed Specimen: Blood Updated: 07/22/22 2015    Blood Culture, Routine No Growth after 4 days of incubation. Narrative:     ORDER#: [id number]                          ORDERED BY: [employer]   SOURCE: Blood                              COLLECTED:  07/18/22 18:41   ANTIBIOTICS AT GURWINDER.:                      RECEIVED :  07/18/22 18:56   If child <=2 yrs old please draw pediatric bottle. ~Blood Culture 1   Culture, Blood 2 [[id number]] Collected: 07/18/22 1850   Order Status: Completed Specimen: Blood Updated: 07/22/22 2015    Culture, Blood 2 No Growth after 4 days of incubation. Narrative:     ORDER#: [id number]                          ORDERED BY: [employer]   SOURCE: Blood                              COLLECTED:  07/18/22 18:50   ANTIBIOTICS AT GURWINDER.:                      RECEIVED :  07/18/22 18:56   If child <=2 yrs old please draw pediatric bottle. ~Blood Culture #2   Strep Pneumoniae Antigen [[id number]] Collected: 07/20/22 1020   Order Status: Completed clinical signs and symptoms or necessary for   patient management, should be tested with an alternative molecular   assay. Negative results do not preclude SARS-CoV-2 infection and   should not be used as the sole basis for patient management decisions. This test has been authorized by the FDA under an Emergency Use   Authorization (EUA) for use by authorized laboratories. Fact sheet for Healthcare Providers:   Maykel.es   Fact sheet for Patients: BuildHer.es     METHODOLOGY: Isothermal Nucleic Acid Amplification         CULTURE, RESPIRATORY Rare growth normal respiratory fabiana with Abnormal     Gram Stain Result No Epithelial Cells seen   3+ WBC's (Polymorphonuclear)   No organisms seen    Organism Pseudomonas aeruginosa Abnormal     CULTURE, RESPIRATORY Light growth    Resulting Agency [facility]          Susceptibility      Pseudomonas aeruginosa (1)    Antibiotic Interpretation Microscan  Method Status    cefepime Sensitive 8 mcg/mL BACTERIAL SUSCEPTIBILITY PANEL BY TIERRA     ciprofloxacin Resistant >2 mcg/mL BACTERIAL SUSCEPTIBILITY PANEL BY TIERRA     gentamicin Sensitive <=4 mcg/mL BACTERIAL SUSCEPTIBILITY PANEL BY TIERRA     meropenem Sensitive <=1 mcg/mL BACTERIAL SUSCEPTIBILITY PANEL BY TIERRA     piperacillin-tazobactam Sensitive <=16 mcg/mL BACTERIAL SUSCEPTIBILITY PANEL BY TIERRA     tobramycin Sensitive <=4 mcg/mL BACTERIAL SUSCEPTIBILITY PANEL BY TIERRA          Narrative  Performed by: [address] [facility]  ORDER#: [id number]                          ORDERED BY: CIRA KEBEDE   SOURCE: Sputum Expectorated                COLLECTED:  07/22/22 12:00   ANTIBIOTICS AT GURWINDER.:                      RECEIVED :  07/22/22 12:22           Lab Results   Component Value Date/Time    BC No Growth after 4 days of incubation. 07/18/2022 06:41 PM    BLOODCULT2 No Growth after 4 days of incubation.  07/18/2022 06:50 PM       Respiratory Culture:  Lab Results Component Value Date/Time    CULTRESP Normal respiratory fabiana 07/30/2022 04:00 PM    LABGRAM  07/30/2022 04:00 PM     3+ WBC's (Mononuclear)  1+ Epithelial Cells  1+ Gram positive cocci       AFB:No results found for: AFBSMEAR  Viral Culture:  Lab Results   Component Value Date/Time    COVID19 Not Detected 08/01/2022 01:55 PM     Urine Culture: No results for input(s): LABURIN in the last 72 hours. IMAGING:    XR CHEST PORTABLE   Final Result   Increasing opacity throughout portions of the right lung, which can reflect   combination of airspace disease, atelectasis, and pleural effusion. XR CHEST PORTABLE   Final Result   Endotracheal tube with the tip approximately 1 cm above the song. Consider   retraction by approximately 3.5 cm. Enteric tube with the tip and the side   hole below the diaphragm overlying the left upper abdomen, likely within the   fundus of the stomach. Redemonstration of multifocal pneumonia affecting the right lung. Small   right pleural effusion. XR CHEST PORTABLE   Final Result   Unchanged multifocal right-sided pneumonia. XR CHEST PORTABLE   Final Result   Stable multifocal airspace disease in the right lung with probable upper lobe   abscess. XR CHEST PORTABLE   Final Result   Stable multifocal airspace disease in the right lung, including probable   pulmonary abscess in the right upper lobe. CT CHEST WO CONTRAST   Final Result   1. Mixed consolidative and ground-glass opacities as well as interstitial   thickening throughout the majority of the right lung compatible with   pneumonia. 2. There is a cavity in the right upper lobe demonstrating an air-fluid level   measuring up to 9.7 cm concerning for abscess formation. 3. Trace right pleural effusion. 4. Prominent and enlarged mediastinal lymph nodes, likely reactive. 5. Stable 6 mm left lower lobe pulmonary nodule.       RECOMMENDATIONS:   Fleischner Society guidelines for follow-up and management of incidentally   detected pulmonary nodules:      Single Solid Nodule:      Nodule size less than 6 mm   In a low-risk patient, no routine follow-up. In a high-risk patient, optional CT at 12 months. Nodule size equals 6-8 mm   In a low-risk patient, CT at 6-12 months, then consider CT at 18-24 months. In a high-risk patient, CT at 6-12 months, then CT at 18-24 months. Nodule size greater than 8 mm         In a low-risk patient, consider CT at 3 months, PET/CT, or tissue sampling. In a high-risk patient, consider CT at 3 months, PET/CT, or tissue sampling. Multiple Solid Nodules:      Nodule size less than 6 mm   In a low-risk patient, no routine follow-up. In a high-risk patient, optional CT at 12 months. Nodule size equals 6-8 mm   In a low-risk patient, CT at 3-6 months, then consider CT at 18-24 months. In a high-risk patient, CT at 3-6 months, then CT at 18-24 months. Nodule size greater than 8 mm   In a low-risk patient, CT at 3-6 months, then consider CT at 18-24 months. In a high-risk patient, CT at 3-6 months, then CT at 18-24 months. - Low risk patients include individuals with minimal or absent history of   smoking and other known risk factors. - High risk patients include individuals with a history or smoking or known   risk factors. Radiology 2017 http://pubs. rsna.org/doi/full/10.1148/radiol. 0446031701         XR CHEST PORTABLE   Final Result   1. Increased pneumonia throughout the right lung remaining most prominent in   the right upper lobe. 2. Emphysema better demonstrated on CT. 3. Possible trace right pleural effusion. XR CHEST PORTABLE   Final Result   Improved aeration of the right chest with persistent consolidation in the mid   to upper right chest.         CT CHEST WO CONTRAST    (Results Pending)     XR CHEST PORTABLE   Final Result   1.  Increased pneumonia throughout the right lung remaining most prominent in   the right upper lobe. 2. Emphysema better demonstrated on CT. 3. Possible trace right pleural effusion. XR CHEST PORTABLE   Final Result   Improved aeration of the right chest with persistent consolidation in the mid   to upper right chest.                All pertinent images and reports for the current Hospitalization were reviewed by me.        Scheduled Meds:   sodium chloride (Inhalant)  15 mL Nebulization Q4H While awake    ipratropium-albuterol  1 ampule Inhalation Q4H    fluconazole  100 mg Oral Daily    [Held by provider] enoxaparin  40 mg SubCUTAneous Daily    meropenem  1,000 mg IntraVENous Q8H    tobramycin (PF)  300 mg Nebulization BID    [Held by provider] aspirin  81 mg Oral Daily    nystatin  5 mL Oral 4x Daily    [Held by provider] prasugrel  10 mg Oral Daily    sodium chloride flush  5-40 mL IntraVENous 2 times per day    atorvastatin  40 mg Oral Nightly    tiZANidine  4 mg Oral Nightly    gabapentin  600 mg Oral BID    DULoxetine  60 mg Oral BID    mometasone-formoterol  2 puff Inhalation BID       Continuous Infusions:   norepinephrine Stopped (08/07/22 0845)    sodium chloride 10 mL/hr at 08/07/22 0850    dextrose         PRN Meds:  potassium chloride, sodium chloride, sodium chloride flush, sodium chloride, acetaminophen, perflutren lipid microspheres, traZODone, ipratropium-albuterol, cetirizine, albuterol sulfate HFA, glucose, dextrose bolus **OR** dextrose bolus, glucagon (rDNA), dextrose, ondansetron, polyethylene glycol      Assessment:     Patient Active Problem List   Diagnosis    Osteopenia-last dexa 2009 repeat 2015 (-2.4 hip)--advised tx    Colon polyp, hyperplastic,-(done 3/11-repeat 3-5 yrs--dr Sheeba Covarrubias)    Family history of colon cancer    CTS (carpal tunnel syndrome)BILATERAL-s/p surgical repair--resolved     Postmenopausal status-last mammogram 2/15 wnl last pap 12/13--wnl    Nondependent alcohol abuse, in remission    Urticaria, chronic    Smoking Chronic back pain-(djd) saw dr Steven Velazquez afford surgery--seeing dr Papo Thompson for pain meds    Fibromyalgia    Hypercholesteremia    Lumbar spondylosis    Vitamin D deficiency--severe--started 50,000 iu 2x/ wk 11/13    Insomnia--on elevil w help    Constipation--on daily miralax    Depression    Chronic pain syndrome    Muscle cramping    Acute on chronic respiratory failure with hypercapnia ([address]    ROLY (acute kidney injury) ([address]    Severe sepsis (HCC)    Gastroesophageal reflux disease    COPD, severe (HCC)    Chronic hypoxemic respiratory failure ([address]    Degeneration of lumbar or lumbosacral intervertebral disc    Trochanteric bursitis of right hip    COPD exacerbation ([address]    Diabetes ([address]    Controlled type 2 diabetes mellitus without complication, without long-term current use of insulin (HCC)    Age-related osteoporosis without current pathological fracture- started alendronate 9/2021    Pulmonary nodule seen on imaging study    Pneumonia of right lung due to infectious organism    General weakness    Elevated lactic acid level    Community acquired pneumonia of right lung    Chronic obstructive pulmonary disease ([address]    Acute respiratory failure with hypoxia (HCC)    Acute on chronic respiratory failure with hypoxia and hypercapnia (HCC)    Abnormal EKG    NSTEMI (non-ST elevated myocardial infarction) ([address]    Necrotizing pneumonia (HCC)    Abscess of upper lobe of right lung with pneumonia (HCC)    Pseudomonas respiratory infection    Abnormal CT of the chest    Peripherally inserted central catheter (PICC) in place    Massive hemoptysis    Epistaxis    Acute respiratory insufficiency    Acute blood loss anemia    Hypotension due to drugs     Severe necrotizing Pseudomonas pneumonia  Right upper lobe cavitary lesion  Right right lung evolving abscess  Right lung dense consolidation of  Pseudomonas pneumonia developing some resistance  COPD exacerbation  Hypoxic respiratory failure  Coronary artery disease  Status post cardiac cath  Chronic smoking  Abnormal CT chest  BNP elevation  COVID-19 negative  Epistaxis severe vs Hemoptysis now intubated sedated on the ventilator for airway protection 8/3/22   Now extubated to Venturi mask  Rhino Rocket removed by ENT      Unfortunately she developed progressive changes with dense consolidation and lung abscess secondary to Pseudomonas pneumonia. Pseudomonas appears to have developed resistance to quinolones while on therapy this is concerning. CT chest on this admission grossly abnormal and definitely there is progressive changes given the severity of the -pneumonia will need IV antibiotics     OPAT d/w pt she needs to QUIT smoking d/w pt and her family        Given the lung findings will have to continue antibiotic therapy anticipate least 2 more weeks of duration of treatment    repeat sputum testing to see if Pseudomonas is clearing out on the current therapy-sputum repeat Normal fabiana noted      transferred to ICU secondary to massive epistaxis versus was  intubated on the ventilator status post bronchoscopy      Extubated on 8/4/22 and on Venturi mask has Rhinorocket in place ENT notes reviewed      Still has mild epistaxis noted better controlled ENT is following closely and the current antibiotics will provide sinus coverage     May repeat CT chest before d/c is she stays longer as in patient     Rhino Rocket removed by ENT today no further epistaxis noted tolerating antibiotic therapy okay      Labs, Microbiology, Radiology and all the pertinent results from current hospitalization and  care every where were reviewed  by me as a part of the evaluation   Plan:   Cont  IV Meropenem 1 gm q 8 HR X stop date  x  8/27  2. Can d/c Tobramycin neb today    3 . Picc line in place   4. OPAT d/w pt  5 QUIT smoking  6. Will repeat CT chest as in patient if she stays longer in house  7 Cont supportive care  8. CT images reviewed see above    9.  Risk for progression  10 on o Venturi mask  11. Repeat sputum cleared   12. Robby DONE   13, ENT following for massive Epistaxis - controlled with Rhino rocket  off blood thinners for now - Rhino Rocket removed   14. oral flUCONAZOLE for yeast infection           Discussed with patient/Family and Nursing   Risk of Complications/Morbidity: High      Illness(es)/ Infection present that pose threat to bodily function. There is potential for severe exacerbation of infection/side effects of treatment. Therapy requires intensive monitoring for antimicrobial agent toxicity. Thanks for allowing me to participate in your patient's care and please call me with any questions or concerns.     Jenni Aguirre MD  Infectious Disease  [facility] ([facility]) Physician  Phone: [phone]   Fax : [phone]

## 2022-08-08 LAB
ANION GAP SERPL CALCULATED.3IONS-SCNC: 5 MMOL/L (ref 3–16)
BASE EXCESS ARTERIAL: 13.4 MMOL/L (ref -3–3)
BASOPHILS ABSOLUTE: 0 K/UL (ref 0–0.2)
BASOPHILS RELATIVE PERCENT: 0.6 %
BUN BLDV-MCNC: 11 MG/DL (ref 7–20)
CALCIUM SERPL-MCNC: 7.9 MG/DL (ref 8.3–10.6)
CARBOXYHEMOGLOBIN ARTERIAL: 2.3 % (ref 0–1.5)
CHLORIDE BLD-SCNC: 96 MMOL/L (ref 99–110)
CO2: 36 MMOL/L (ref 21–32)
CREAT SERPL-MCNC: <0.5 MG/DL (ref 0.6–1.2)
EOSINOPHILS ABSOLUTE: 0.3 K/UL (ref 0–0.6)
EOSINOPHILS RELATIVE PERCENT: 3.7 %
GFR AFRICAN AMERICAN: >60
GFR NON-AFRICAN AMERICAN: >60
GLUCOSE BLD-MCNC: 170 MG/DL (ref 70–99)
HCO3 ARTERIAL: 39.8 MMOL/L (ref 21–29)
HCT VFR BLD CALC: 22.3 % (ref 36–48)
HEMOGLOBIN, ART, EXTENDED: 7.1 G/DL (ref 12–16)
HEMOGLOBIN: 7.4 G/DL (ref 12–16)
LYMPHOCYTES ABSOLUTE: 1.2 K/UL (ref 1–5.1)
LYMPHOCYTES RELATIVE PERCENT: 17.6 %
MCH RBC QN AUTO: 30 PG (ref 26–34)
MCHC RBC AUTO-ENTMCNC: 33.3 G/DL (ref 31–36)
MCV RBC AUTO: 90.1 FL (ref 80–100)
METHEMOGLOBIN ARTERIAL: 1.5 %
MONOCYTES ABSOLUTE: 1.1 K/UL (ref 0–1.3)
MONOCYTES RELATIVE PERCENT: 16 %
NEUTROPHILS ABSOLUTE: 4.3 K/UL (ref 1.7–7.7)
NEUTROPHILS RELATIVE PERCENT: 62.1 %
O2 SAT, ARTERIAL: 99.7 %
O2 THERAPY: ABNORMAL
PCO2 ARTERIAL: 65.6 MMHG (ref 35–45)
PDW BLD-RTO: 14.5 % (ref 12.4–15.4)
PH ARTERIAL: 7.39 (ref 7.35–7.45)
PLATELET # BLD: 444 K/UL (ref 135–450)
PMV BLD AUTO: 6.7 FL (ref 5–10.5)
PO2 ARTERIAL: 211 MMHG (ref 75–108)
POTASSIUM REFLEX MAGNESIUM: 3.7 MMOL/L (ref 3.5–5.1)
RBC # BLD: 2.48 M/UL (ref 4–5.2)
SODIUM BLD-SCNC: 137 MMOL/L (ref 136–145)
TCO2 ARTERIAL: 41.8 MMOL/L
WBC # BLD: 6.9 K/UL (ref 4–11)

## 2022-08-08 PROCEDURE — 36600 WITHDRAWAL OF ARTERIAL BLOOD: CPT

## 2022-08-08 PROCEDURE — 99233 SBSQ HOSP IP/OBS HIGH 50: CPT | Performed by: INTERNAL MEDICINE

## 2022-08-08 PROCEDURE — 6370000000 HC RX 637 (ALT 250 FOR IP): Performed by: INTERNAL MEDICINE

## 2022-08-08 PROCEDURE — 2500000003 HC RX 250 WO HCPCS: Performed by: NURSE PRACTITIONER

## 2022-08-08 PROCEDURE — 2580000003 HC RX 258: Performed by: INTERNAL MEDICINE

## 2022-08-08 PROCEDURE — 82803 BLOOD GASES ANY COMBINATION: CPT

## 2022-08-08 PROCEDURE — 6360000002 HC RX W HCPCS: Performed by: NURSE PRACTITIONER

## 2022-08-08 PROCEDURE — 85025 COMPLETE CBC W/AUTO DIFF WBC: CPT

## 2022-08-08 PROCEDURE — 2000000000 HC ICU R&B

## 2022-08-08 PROCEDURE — 80048 BASIC METABOLIC PNL TOTAL CA: CPT

## 2022-08-08 PROCEDURE — 6370000000 HC RX 637 (ALT 250 FOR IP): Performed by: NURSE PRACTITIONER

## 2022-08-08 PROCEDURE — 94669 MECHANICAL CHEST WALL OSCILL: CPT

## 2022-08-08 PROCEDURE — 6360000002 HC RX W HCPCS: Performed by: INTERNAL MEDICINE

## 2022-08-08 PROCEDURE — 97530 THERAPEUTIC ACTIVITIES: CPT

## 2022-08-08 PROCEDURE — 94761 N-INVAS EAR/PLS OXIMETRY MLT: CPT

## 2022-08-08 PROCEDURE — 94640 AIRWAY INHALATION TREATMENT: CPT

## 2022-08-08 PROCEDURE — 99291 CRITICAL CARE FIRST HOUR: CPT | Performed by: INTERNAL MEDICINE

## 2022-08-08 PROCEDURE — 2700000000 HC OXYGEN THERAPY PER DAY

## 2022-08-08 PROCEDURE — 92526 ORAL FUNCTION THERAPY: CPT

## 2022-08-08 PROCEDURE — APPNB15 APP NON BILLABLE TIME 0-15 MINS: Performed by: NURSE PRACTITIONER

## 2022-08-08 RX ORDER — OXYMETAZOLINE HYDROCHLORIDE 0.05 G/100ML
2 SPRAY NASAL 2 TIMES DAILY
Status: COMPLETED | OUTPATIENT
Start: 2022-08-08 | End: 2022-08-10

## 2022-08-08 RX ORDER — ASPIRIN 81 MG/1
81 TABLET, CHEWABLE ORAL DAILY
Status: DISCONTINUED | OUTPATIENT
Start: 2022-08-09 | End: 2022-08-16 | Stop reason: HOSPADM

## 2022-08-08 RX ORDER — POTASSIUM CHLORIDE 29.8 MG/ML
20 INJECTION INTRAVENOUS ONCE
Status: COMPLETED | OUTPATIENT
Start: 2022-08-08 | End: 2022-08-08

## 2022-08-08 RX ORDER — CALCIUM GLUCONATE 20 MG/ML
1000 INJECTION, SOLUTION INTRAVENOUS ONCE
Status: COMPLETED | OUTPATIENT
Start: 2022-08-08 | End: 2022-08-08

## 2022-08-08 RX ADMIN — TIZANIDINE 4 MG: 4 TABLET ORAL at 20:26

## 2022-08-08 RX ADMIN — MEROPENEM 1000 MG: 1 INJECTION, POWDER, FOR SOLUTION INTRAVENOUS at 13:12

## 2022-08-08 RX ADMIN — POTASSIUM CHLORIDE 20 MEQ: 29.8 INJECTION, SOLUTION INTRAVENOUS at 09:36

## 2022-08-08 RX ADMIN — OXYMETAZOLINE HCL 2 SPRAY: 0.05 SPRAY NASAL at 13:09

## 2022-08-08 RX ADMIN — IPRATROPIUM BROMIDE AND ALBUTEROL SULFATE 1 AMPULE: .5; 3 SOLUTION RESPIRATORY (INHALATION) at 19:45

## 2022-08-08 RX ADMIN — IPRATROPIUM BROMIDE AND ALBUTEROL SULFATE 1 AMPULE: .5; 3 SOLUTION RESPIRATORY (INHALATION) at 15:42

## 2022-08-08 RX ADMIN — DULOXETINE HYDROCHLORIDE 60 MG: 60 CAPSULE, DELAYED RELEASE ORAL at 09:38

## 2022-08-08 RX ADMIN — SODIUM CHLORIDE SOLN NEBU 3% 15 ML: 3 NEBU SOLN at 09:00

## 2022-08-08 RX ADMIN — GABAPENTIN 600 MG: 300 CAPSULE ORAL at 20:26

## 2022-08-08 RX ADMIN — ATORVASTATIN CALCIUM 40 MG: 40 TABLET, FILM COATED ORAL at 20:26

## 2022-08-08 RX ADMIN — Medication 2 MCG/MIN: at 22:47

## 2022-08-08 RX ADMIN — CALCIUM GLUCONATE 1000 MG: 20 INJECTION, SOLUTION INTRAVENOUS at 09:37

## 2022-08-08 RX ADMIN — NYSTATIN 500000 UNITS: 100000 SUSPENSION ORAL at 20:27

## 2022-08-08 RX ADMIN — MEROPENEM 1000 MG: 1 INJECTION, POWDER, FOR SOLUTION INTRAVENOUS at 04:35

## 2022-08-08 RX ADMIN — NYSTATIN 500000 UNITS: 100000 SUSPENSION ORAL at 13:10

## 2022-08-08 RX ADMIN — NYSTATIN 500000 UNITS: 100000 SUSPENSION ORAL at 09:38

## 2022-08-08 RX ADMIN — OXYMETAZOLINE HCL 2 SPRAY: 0.05 SPRAY NASAL at 20:34

## 2022-08-08 RX ADMIN — SODIUM CHLORIDE, PRESERVATIVE FREE 10 ML: 5 INJECTION INTRAVENOUS at 20:35

## 2022-08-08 RX ADMIN — IPRATROPIUM BROMIDE AND ALBUTEROL SULFATE 1 AMPULE: .5; 3 SOLUTION RESPIRATORY (INHALATION) at 11:36

## 2022-08-08 RX ADMIN — DULOXETINE HYDROCHLORIDE 60 MG: 60 CAPSULE, DELAYED RELEASE ORAL at 20:26

## 2022-08-08 RX ADMIN — GABAPENTIN 600 MG: 300 CAPSULE ORAL at 09:38

## 2022-08-08 RX ADMIN — SODIUM CHLORIDE SOLN NEBU 3% 15 ML: 3 NEBU SOLN at 11:36

## 2022-08-08 RX ADMIN — MOMETASONE FUROATE AND FORMOTEROL FUMARATE DIHYDRATE 2 PUFF: 200; 5 AEROSOL RESPIRATORY (INHALATION) at 09:00

## 2022-08-08 RX ADMIN — SODIUM CHLORIDE, PRESERVATIVE FREE 10 ML: 5 INJECTION INTRAVENOUS at 09:39

## 2022-08-08 RX ADMIN — MEROPENEM 1000 MG: 1 INJECTION, POWDER, FOR SOLUTION INTRAVENOUS at 22:06

## 2022-08-08 RX ADMIN — IPRATROPIUM BROMIDE AND ALBUTEROL SULFATE 1 AMPULE: .5; 3 SOLUTION RESPIRATORY (INHALATION) at 08:59

## 2022-08-08 RX ADMIN — ACETAMINOPHEN 650 MG: 325 TABLET ORAL at 20:26

## 2022-08-08 RX ADMIN — SODIUM CHLORIDE SOLN NEBU 3% 15 ML: 3 NEBU SOLN at 15:42

## 2022-08-08 RX ADMIN — MOMETASONE FUROATE AND FORMOTEROL FUMARATE DIHYDRATE 2 PUFF: 200; 5 AEROSOL RESPIRATORY (INHALATION) at 19:45

## 2022-08-08 RX ADMIN — FLUCONAZOLE 100 MG: 100 TABLET ORAL at 09:38

## 2022-08-08 RX ADMIN — SODIUM CHLORIDE SOLN NEBU 3% 15 ML: 3 NEBU SOLN at 19:45

## 2022-08-08 ASSESSMENT — PAIN - FUNCTIONAL ASSESSMENT: PAIN_FUNCTIONAL_ASSESSMENT: PREVENTS OR INTERFERES SOME ACTIVE ACTIVITIES AND ADLS

## 2022-08-08 ASSESSMENT — PAIN SCALES - WONG BAKER
WONGBAKER_NUMERICALRESPONSE: 0

## 2022-08-08 ASSESSMENT — PAIN DESCRIPTION - PAIN TYPE: TYPE: CHRONIC PAIN

## 2022-08-08 ASSESSMENT — PAIN SCALES - GENERAL
PAINLEVEL_OUTOF10: 0
PAINLEVEL_OUTOF10: 8
PAINLEVEL_OUTOF10: 0

## 2022-08-08 ASSESSMENT — PAIN DESCRIPTION - LOCATION: LOCATION: GENERALIZED

## 2022-08-08 ASSESSMENT — PAIN DESCRIPTION - DESCRIPTORS: DESCRIPTORS: DISCOMFORT

## 2022-08-08 ASSESSMENT — PAIN DESCRIPTION - ONSET: ONSET: ON-GOING

## 2022-08-08 ASSESSMENT — PAIN DESCRIPTION - FREQUENCY: FREQUENCY: CONTINUOUS

## 2022-08-08 NOTE — PROGRESS NOTES
Physical Therapy  Facility/Department: [address] ICU  Physical Therapy Treatment Note    Name: Maria Alejandra Pearson  : 1945  MRN: [id number]  Date of Service: 2022    Discharge Recommendations:  Continue to assess pending progress, Subacute/Skilled Nursing Facility   PT Equipment Recommendations  Other: Defer to next level of care. Maria Alejandra Pearson scored a 12/24 on the AM-PAC short mobility form. Current research shows that an AM-PAC score of 17 or less is typically not associated with a discharge to the patient's home setting. Based on the patient's AM-PAC score and their current functional mobility deficits, it is recommended that the patient have 3-5 sessions per week of Physical Therapy at d/c to increase the patient's independence. Please see assessment section for further patient specific details. If patient discharges prior to next session this note will serve as a discharge summary. Please see below for the latest assessment towards goals. Assessment   Body Structures, Functions, Activity Limitations Requiring Skilled Therapeutic Intervention: Decreased functional mobility ; Decreased strength;Decreased endurance  Assessment: 77 y/o female admit 2022 with Pneumonia, Elevated Troponin. 2022 Cardiac Cath : NSTEMI.  8/3/2022 Pt transfer to ICU with worsening Respiratory Concerns. -2022 Pt Intubated. Recent hospital admit 7/3-2022 (admit from home) and d/c to SNF setting. PMH as noted including COPD, Lumbar Spondlyosis, Fibromyalgia. Pt admit from SNF setting; prior pt living with sign other in apt setting. Pt currently requiring O2 via NRB. Pt rell eob/oob via Stedy with Min assist.  Sats remain at least 90%. Endurance slowly improving. At this time, anticipate return to SNF setting upon d/c. Therapy Prognosis: Fair  History: 77 y/o female admit 2022 with Pneumonia, Elevated Troponin.   2022 Cardiac Cath : NSTEMI.  8/3/2022 Pt transfer to ICU with worsening Respiratory Concerns. 8/l3-8/4/2022 Pt Intubated. Recent hospital admit 7/3-7/13/2022 (admit from home) and d/c to SNF setting. PMH as noted including COPD, Lumbar Spondlyosis, Fibromyalgia. Exam: See above. Clinical Presentation: See above. Barriers to Learning: Endurance. Requires PT Follow-Up: Yes  Activity Tolerance  Activity Tolerance: Patient limited by endurance  Activity Tolerance Comments: Pt currenlty requiring O2 via NRB. Pt rell eob/oob via Stedy with Min assist.  Sats remain at least 90%. Endurance slowly improving. Plan   Plan  Plan: 3-5 times per week  Current Treatment Recommendations: Strengthening, Functional mobility training, Transfer training, Gait training, Endurance training, Safety education & training, Patient/Caregiver education & training  Safety Devices  Type of Devices: Call light within reach, Nurse notified, Left in chair     Restrictions  Restrictions/Precautions  Restrictions/Precautions: Fall Risk  Position Activity Restriction  Other position/activity restrictions: O2 via NRB. (No supplemental oxygen by nasal cannula per ENT); Diet: Pureed, honey thick     Subjective   General  Chart Reviewed: Yes  Patient assessed for rehabilitation services?: Yes  Additional Pertinent Hx: 77 y/o female admit 7/18/2022 with Pneumonia, Elevated Troponin. 7/19/2022 Cardiac Cath : NSTEMI.  8/3/2022 Pt transfer to ICU with worsening Respiratory Concerns. 8/l3-8/4/2022 Pt Intubated. Recent hospital admit 7/3-7/13/2022 (admit from home) and d/c to SNF setting. PMH as noted including COPD, Lumbar Spondlyosis, Fibromyalgia. Response To Previous Treatment: Patient with no complaints from previous session. Family / Caregiver Present: No  Referring Practitioner: Marcus Vargas NP. Follows Commands: Within Functional Limits  Subjective  Subjective: Pt agreeable to PT Rx.          Social/Functional History  Social/Functional History  Lives With: Significant other (Ray)  Type of Home: Apartment (1st floor.)  Home Layout: One level (laundry in apt)  Home Access: Level entry  Bathroom Shower/Tub: Tub/Shower unit  Bathroom Toilet: Standard  Bathroom Equipment: Shower chair  Bathroom Accessibility: Accessible  Home Equipment: [employer]  ADL Assistance: [address]: Independent (Shared with sign other.)  Ambulation Assistance: Independent (Without assist device.)  Transfer Assistance: Independent  Active : No (Sign other drives.)  Patient's  Info: significant other drives  Occupation: Retired  Additional Comments: Pt recent admit 7/3/2022 with COPD Exac, ROLY, UTI and Sepsis and d/c'd on 7/13/2022 to [facility] at [facility] SNF setting. Vision/Hearing  Vision  Vision: Impaired  Vision Exceptions: Wears glasses for reading  Hearing  Hearing: Within functional limits    Cognition   Orientation  Overall Orientation Status: Impaired  Orientation Level: Oriented to person;Oriented to place (Somewhat confused to timeline of recent events; knew it was August, but not the year.)  Cognition  Overall Cognitive Status: WFL     Objective                Bed mobility  Supine to Sit: Minimal assistance (HOB elevated.)  Transfers  Sit to Stand: Minimal Assistance ([employer].)  Stand to sit: Minimal Assistance ([employer]  Bed to Chair: Dependent/Total ([employer].)  Comment: Additional Transfer from chair via [employer] assist.        Balance  Comments: Pt able to maintain static stand via Stedy ~ 1 min CGA/Min assist; able to lat wgt shift, initiate few steps in place in prep amb activities. AM-PAC Score  AM-PAC Inpatient Mobility Raw Score : 12 (08/08/22 1011)  AM-PAC Inpatient T-Scale Score : 35.33 (08/08/22 1011)  Mobility Inpatient CMS 0-100% Score: 68.66 (08/08/22 1011)  Mobility Inpatient CMS G-Code Modifier : CL (08/08/22 1011)          Goals  Short Term Goals  Time Frame for Short term goals: Upon d/c acute care setting. Short term goal 1: Bed Mob SBA.   Short term goal 2: Transfers with assist device CGA. Short term goal 3: Amb with assist device 25' CGA. Short term goal 4: SpO2 levels to stay >90% with activity  Patient Goals   Patient goals : Get stronger and eventually return home. Education  Patient Education  Education Given To: Patient  Education Provided Comments: Role of PT, POC, Need to call for assist, OOB via Stedy.   Education Method: Verbal;Demonstration  Education Outcome: Continued education needed      Therapy Time   Individual Concurrent Group Co-treatment   Time In 0745         Time Out 0810         Minutes [address] [place] [address],

## 2022-08-08 NOTE — PROGRESS NOTES
Infectious Disease Follow up Notes  Admit Date: 7/18/2022  Hospital Day: 22    Antibiotics :   IV Meropenem    CHIEF COMPLAINT:      Rt UL lung abscess  Severe Necrotizing PNA  Pseudomonas PNA  Hypoxic resp failure  Epistaxis       Subjective interval History :  78 y. o.woman with a past medical history of COPD, depression, chronic hypoxic respiratory failure now on   4 L of oxygen via nasal cannula, diabetes recent admission for COPD exacerbation and pneumonia diagnosed to have Pseudomonas based on sputum studies. She was on IV cefepime transition to oral levofloxacin on discharge. She was sent to [address] rehab facility and now admitted because of worsening shortness of breath increased sputum production. Sputum culture again on this admission positive for Pseudomonas with some resistance pattern, it is now become resistant to quinolones. CT chest on this admission with progressive pneumonia right upper lobe cavitary lesion possible lung abscess. Extensive consolidation  see images -  This is a new finding on the CT chest compared to her previous CT chest on 6/20/22. Given the necrotizing pneumonia with Pseudomonas infection and lung abscess formation we are consulted for recommendations. Interval History : Remains in ICU using Venturi mask for oxygenation. Having more sob+ and cough + and no bleeding from the nose but just tired and Tolerating IV abx ok         Past Medical History:    Past Medical History:   Diagnosis Date    CAD (coronary artery disease) 07/19/2022    NSTEMI, PCI LCx    Chronic pain syndrome     Percocet.  Dr. Villa Elmore    Colorectal polyps     COPD (chronic obstructive pulmonary disease) ([facility] [address]     CTS (carpal tunnel syndrome)     BILATERAL    DDD (degenerative disc disease), lumbar     Depression     Family hx of colon cancer     Fibromyalgia     Hyperlipemia     IBS (irritable bowel syndrome) Lumbar spondylosis     New onset type 2 diabetes mellitus ([facility] [address] 02/03/2020    On home O2     4L    Pneumonia 07/2022    P.aer    Urticaria, chronic        Past Surgical History:    Past Surgical History:   Procedure Laterality Date    BRONCHOSCOPY N/A 8/3/2022    BRONCHOSCOPY performed by Aroldo David DO at [facility] Bilateral     CATARACT EXTRACTION      CERVICAL POLYP REMOVAL  09/2004    CORONARY ANGIOPLASTY WITH STENT PLACEMENT  07/19/2022    LCx 99. PCI/stent. INTRACAPSULAR CATARACT EXTRACTION Left 06/23/2020    Phacoemulsification with intraocular lens implant performed by Dimitrios Daniel MD at [address] Right 07/14/2020    Phacoemulsification with intraocular lens implant performed by Dimitrios Daniel MD at [address]      patient denies        Current Medications:    Outpatient Medications Marked as Taking for the 7/18/22 encounter [facility] Encounter)   Medication Sig Dispense Refill    aspirin 81 MG EC tablet Take 1 tablet by mouth in the morning. 30 tablet 3    prasugrel (EFFIENT) 10 MG TABS Take 1 tablet by mouth in the morning. 30 tablet 1       Allergies:  Patient has no known allergies.     Immunizations :   Immunization History   Administered Date(s) Administered    COVID-19, MODERNA BLUE border, Primary or Immunocompromised, (age 12y+), IM, 100 mcg/0.5mL 03/08/2021, 04/05/2021    COVID-19, MODERNA Booster BLUE border, (age 18y+), IM, 50mcg/0.25mL 12/01/2021    Influenza Vaccine, unspecified formulation 01/10/2017    Influenza, High Dose (Fluzone 65 yrs and older) 11/04/2013, 01/21/2016, 09/01/2017, 10/30/2018, 09/18/2020    Influenza, Quadv, adjuvanted, 65 yrs +, IM, PF (Fluad) 09/27/2021    Influenza, Triv, inactivated, subunit, adjuvanted, IM (Fluad 65 yrs and older) 09/13/2019    Pneumococcal Conjugate 13-valent ([id number]) 01/19/2015    Pneumococcal unilateral weakness  Psychiatric: negative for hallucinations,confusion,agitation.              PHYSICAL EXAM:      Vitals:    BP (!) 86/54   Pulse (!) 108   Temp 98.2 °F (36.8 °C) (Oral)   Resp 22   Ht 5' 4\" (1.626 m)   Wt 129 lb (58.5 kg)   SpO2 100%   BMI 22.14 kg/m²     General Appearance: awake and in some  acute distress, ++  pallor on e Venturi mask+    Skin: warm and dry, no rash or erythema  Head: normocephalic and atraumatic  Eyes: pupils equal, round, and reactive to light, conjunctivae normal  ENT: tympanic membrane, external ear and ear canal normal bilaterally, nose without deformity, nasal mucosa and turbinates normal without polyps  Neck: supple and non-tender without mass, no thyromegaly  no cervical lymphadenopathy  Pulmonary/Chest:  Rt UL coarse crepts+ +  wheezes, rales or rhonchi, normal air movement, in some respiratory distress  Cardiovascular: normal rate, regular rhythm, normal S1 and S2, no murmurs, rubs, clicks, or gallops, no carotid bruits  Abdomen: soft, non-tender, non-distended, normal bowel sounds, no masses or organomegaly  Extremities: no cyanosis, clubbing or edema  Musculoskeletal: normal range of motion, no joint swelling, deformity or tenderness  Integumentary: No rashes, no abnormal skin lesions, no petechiae  Neurologic: reflexes normal and symmetric, no cranial nerve deficit           Lines: PICC     Data Review:    CBC:   Lab Results   Component Value Date    WBC 6.9 08/08/2022    HGB 7.4 (L) 08/08/2022    HCT 22.3 (L) 08/08/2022    MCV 90.1 08/08/2022     08/08/2022     RENAL:   Lab Results   Component Value Date    CREATININE <0.5 (L) 08/08/2022    BUN 11 08/08/2022     08/08/2022    K 3.7 08/08/2022    CL 96 (L) 08/08/2022    CO2 36 (H) 08/08/2022     SED RATE: No results found for: SEDRATE  CK: No results found for: CKTOTAL  CRP: No results found for: CRP  Hepatic Function Panel:   Lab Results   Component Value Date/Time    ALKPHOS 88 07/18/2022 05:20 PM    ALT 65 07/18/2022 05:20 PM    AST 61 07/18/2022 05:20 PM    PROT 5.6 07/18/2022 05:20 PM    PROT 6.9 10/12/2012 11:00 AM    BILITOT 0.5 07/18/2022 05:20 PM    LABALBU 2.4 07/18/2022 05:20 PM     UA:  Lab Results   Component Value Date/Time    COLORU Yellow 07/22/2022 06:31 AM    CLARITYU Clear 07/22/2022 06:31 AM    GLUCOSEU Negative 07/22/2022 06:31 AM    BILIRUBINUR Negative 07/22/2022 06:31 AM    BILIRUBINUR neg 10/17/2019 11:50 AM    KETUA TRACE 07/22/2022 06:31 AM    SPECGRAV 1.049 07/22/2022 06:31 AM    BLOODU Negative 07/22/2022 06:31 AM    PHUR 6.5 07/22/2022 06:31 AM    PROTEINU 30 07/22/2022 06:31 AM    UROBILINOGEN 0.2 07/22/2022 06:31 AM    NITRU Negative 07/22/2022 06:31 AM    LEUKOCYTESUR Negative 07/22/2022 06:31 AM    LABMICR YES 07/22/2022 06:31 AM    URINETYPE NotGiven 07/22/2022 06:31 AM      Urine Microscopic:   Lab Results   Component Value Date/Time    LABCAST 10-20 Hyaline 01/10/2017 06:19 PM    BACTERIA None Seen 07/22/2022 06:31 AM    COMU see below 07/03/2022 03:11 PM    HYALCAST 2 07/22/2022 06:31 AM    WBCUA 2 07/22/2022 06:31 AM    RBCUA 5 07/22/2022 06:31 AM    EPIU 1 07/22/2022 06:31 AM     Urine Reflex to Culture:   Lab Results   Component Value Date/Time    URRFLXCULT Not Indicated 07/03/2022 03:11 PM         MICRO: cultures reviewed and updated by me   Blood Culture:          Culture, Respiratory [[id number]] (Abnormal)  Collected: 07/22/22 1200   Order Status: Completed Specimen: Sputum Expectorated Updated: 07/24/22 0801    CULTURE, RESPIRATORY Rare growth normal respiratory fabiana with Abnormal     Gram Stain Result No Epithelial Cells seen   3+ WBC's (Polymorphonuclear)   No organisms seen     Organism Pseudomonas aeruginosa Abnormal     CULTURE, RESPIRATORY Light growth   Narrative:     ORDER#: [id number]                          ORDERED BY: CIRA KEBEDE   SOURCE: Sputum Expectorated                COLLECTED:  07/22/22 12:00   ANTIBIOTICS AT GURWINDER.: RECEIVED :  07/22/22 12:22   Respiratory Culture [[id number]] (Abnormal)  Collected: 07/19/22 2030   Order Status: Completed Specimen: Sputum Expectorated Updated: 07/23/22 0748    CULTURE, RESPIRATORY Light growth normal respiratory fabiana with Abnormal     Gram Stain Result 2+ Gram positive cocci   2+ WBC's (Polymorphonuclear)   1+ Epithelial Cells     Organism Pseudomonas aeruginosa Abnormal     CULTURE, RESPIRATORY Light growth   Narrative:     ORDER#: [id number]                          ORDERED BY: FARZANA GAN   SOURCE: Sputum Expectorated                COLLECTED:  07/19/22 20:30   ANTIBIOTICS AT GURWINDER.:                      RECEIVED :  07/19/22 21:19   Culture, Blood 1 [[id number]] Collected: 07/18/22 1841   Order Status: Completed Specimen: Blood Updated: 07/22/22 2015    Blood Culture, Routine No Growth after 4 days of incubation. Narrative:     ORDER#: [id number]                          ORDERED BY: Janki Ocasio   SOURCE: Blood                              COLLECTED:  07/18/22 18:41   ANTIBIOTICS AT GURWINDER.:                      RECEIVED :  07/18/22 18:56   If child <=2 yrs old please draw pediatric bottle. ~Blood Culture 1   Culture, Blood 2 [[id number]] Collected: 07/18/22 1850   Order Status: Completed Specimen: Blood Updated: 07/22/22 2015    Culture, Blood 2 No Growth after 4 days of incubation. Narrative:     ORDER#: [id number]                          ORDERED BY: Janki Ocasio   SOURCE: Blood                              COLLECTED:  07/18/22 18:50   ANTIBIOTICS AT GURWINDER.:                      RECEIVED :  07/18/22 18:56   If child <=2 yrs old please draw pediatric bottle. ~Blood Culture #2   Strep Pneumoniae Antigen [[id number]] Collected: 07/20/22 1020   Order Status: Completed Specimen: Urine, clean catch Updated: 07/21/22 0841    STREP PNEUMONIAE ANTIGEN, URINE --    Presumptive Negative   Presumptive negative suggests no current or recent   pneumococcal infection.  Infection due to Strep pneumoniae   cannot be ruled out since the antigen present in the sample   may be below the detection limit of the test.   Normal Range:Presumptive Negative    Narrative:     ORDER#: [id number]                          ORDERED BY: FARZANA GAN   SOURCE: Urine Clean Catch                  COLLECTED:  07/20/22 10:20   ANTIBIOTICS AT GURWINDER.:                      RECEIVED :  07/20/22 10:27   Legionella antigen, urine [[id number]] Collected: 07/20/22 1020   Order Status: Completed Specimen: Urine, catheter Updated: 07/21/22 0840    L. pneumophila Serogp 1 Ur Ag --    Presumptive Negative   No Legionella pneumophila serogroup 1 antigens detected. A negative result does not exclude infection with   Legionella pneumophila serogroup 1 nor does it rule out   other microbial-caused respiratory infections or   disease caused by other serogroups of   Legionella pneumophila. Normal Range: Presumptive Negative    Narrative:     ORDER#: [id number]                          ORDERED BY: FARZANA GAN   SOURCE: Urine Catheter                     COLLECTED:  07/20/22 10:20   ANTIBIOTICS AT GURWINDER.:                      RECEIVED :  07/20/22 10:28   Sputum gram stain [[id number]] Collected: 07/19/22 2030   Order Status: Canceled Specimen: Sputum Expectorated    Rapid influenza A/B antigens [[id number]] Collected: 07/19/22 0210   Order Status: Completed Specimen: Nares Updated: 07/19/22 0245    Rapid Influenza A Ag Negative    Rapid Influenza B Ag Negative   COVID-19, Rapid [[id number]] Collected: 07/18/22 1825   Order Status: Completed Specimen: Nasopharyngeal Swab Updated: 07/18/22 1854    SARS-CoV-2, NAAT Not Detected    Comment: Rapid NAAT:   Negative results should be treated as presumptive and,   if inconsistent with clinical signs and symptoms or necessary for   patient management, should be tested with an alternative molecular   assay.  Negative results do not preclude SARS-CoV-2 infection and   should not be used as the sole basis for patient management decisions. This test has been authorized by the FDA under an Emergency Use   Authorization (EUA) for use by authorized laboratories. Fact sheet for Healthcare Providers:   Maykel.es   Fact sheet for Patients: Maykel.prashanth     METHODOLOGY: Isothermal Nucleic Acid Amplification         CULTURE, RESPIRATORY Rare growth normal respiratory fabiana with Abnormal     Gram Stain Result No Epithelial Cells seen   3+ WBC's (Polymorphonuclear)   No organisms seen    Organism Pseudomonas aeruginosa Abnormal     CULTURE, RESPIRATORY Light growth    Resulting Agency [facility]          Susceptibility      Pseudomonas aeruginosa (1)    Antibiotic Interpretation Microscan  Method Status    cefepime Sensitive 8 mcg/mL BACTERIAL SUSCEPTIBILITY PANEL BY TIERRA     ciprofloxacin Resistant >2 mcg/mL BACTERIAL SUSCEPTIBILITY PANEL BY TIERRA     gentamicin Sensitive <=4 mcg/mL BACTERIAL SUSCEPTIBILITY PANEL BY TIERRA     meropenem Sensitive <=1 mcg/mL BACTERIAL SUSCEPTIBILITY PANEL BY TIERRA     piperacillin-tazobactam Sensitive <=16 mcg/mL BACTERIAL SUSCEPTIBILITY PANEL BY TIERRA     tobramycin Sensitive <=4 mcg/mL BACTERIAL SUSCEPTIBILITY PANEL BY TIERRA          Narrative  Performed by: [facility]  ORDER#: [id number]                          ORDERED BY: CIRA KEBEDE   SOURCE: Sputum Expectorated                COLLECTED:  07/22/22 12:00   ANTIBIOTICS AT GURWINDER.:                      RECEIVED :  07/22/22 12:22           Lab Results   Component Value Date/Time    BC No Growth after 4 days of incubation. 07/18/2022 06:41 PM    BLOODCULT2 No Growth after 4 days of incubation.  07/18/2022 06:50 PM       Respiratory Culture:  Lab Results   Component Value Date/Time    CULTRESP Normal respiratory fabiana 07/30/2022 04:00 PM    LABGRAM  07/30/2022 04:00 PM     3+ WBC's (Mononuclear)  1+ Epithelial Cells  1+ Gram positive cocci       AFB:No results found for: AFBSMEAR  Viral Culture:  Lab Results   Component Value Date/Time    COVID19 Not Detected 08/01/2022 01:55 PM     Urine Culture: No results for input(s): Marlin Williamson in the last 72 hours. IMAGING:    XR CHEST PORTABLE   Final Result   Increasing opacity throughout portions of the right lung, which can reflect   combination of airspace disease, atelectasis, and pleural effusion. XR CHEST PORTABLE   Final Result   Endotracheal tube with the tip approximately 1 cm above the song. Consider   retraction by approximately 3.5 cm. Enteric tube with the tip and the side   hole below the diaphragm overlying the left upper abdomen, likely within the   fundus of the stomach. Redemonstration of multifocal pneumonia affecting the right lung. Small   right pleural effusion. XR CHEST PORTABLE   Final Result   Unchanged multifocal right-sided pneumonia. XR CHEST PORTABLE   Final Result   Stable multifocal airspace disease in the right lung with probable upper lobe   abscess. XR CHEST PORTABLE   Final Result   Stable multifocal airspace disease in the right lung, including probable   pulmonary abscess in the right upper lobe. CT CHEST WO CONTRAST   Final Result   1. Mixed consolidative and ground-glass opacities as well as interstitial   thickening throughout the majority of the right lung compatible with   pneumonia. 2. There is a cavity in the right upper lobe demonstrating an air-fluid level   measuring up to 9.7 cm concerning for abscess formation. 3. Trace right pleural effusion. 4. Prominent and enlarged mediastinal lymph nodes, likely reactive. 5. Stable 6 mm left lower lobe pulmonary nodule. RECOMMENDATIONS:   Fleischner Society guidelines for follow-up and management of incidentally   detected pulmonary nodules:      Single Solid Nodule:      Nodule size less than 6 mm   In a low-risk patient, no routine follow-up.    In a high-risk patient, optional CT at 12 months. Nodule size equals 6-8 mm   In a low-risk patient, CT at 6-12 months, then consider CT at 18-24 months. In a high-risk patient, CT at 6-12 months, then CT at 18-24 months. Nodule size greater than 8 mm         In a low-risk patient, consider CT at 3 months, PET/CT, or tissue sampling. In a high-risk patient, consider CT at 3 months, PET/CT, or tissue sampling. Multiple Solid Nodules:      Nodule size less than 6 mm   In a low-risk patient, no routine follow-up. In a high-risk patient, optional CT at 12 months. Nodule size equals 6-8 mm   In a low-risk patient, CT at 3-6 months, then consider CT at 18-24 months. In a high-risk patient, CT at 3-6 months, then CT at 18-24 months. Nodule size greater than 8 mm   In a low-risk patient, CT at 3-6 months, then consider CT at 18-24 months. In a high-risk patient, CT at 3-6 months, then CT at 18-24 months. - Low risk patients include individuals with minimal or absent history of   smoking and other known risk factors. - High risk patients include individuals with a history or smoking or known   risk factors. Radiology 2017 http://pubs. rsna.org/doi/full/10.1148/radiol. 4638866637         XR CHEST PORTABLE   Final Result   1. Increased pneumonia throughout the right lung remaining most prominent in   the right upper lobe. 2. Emphysema better demonstrated on CT. 3. Possible trace right pleural effusion. XR CHEST PORTABLE   Final Result   Improved aeration of the right chest with persistent consolidation in the mid   to upper right chest.         CT CHEST WO CONTRAST    (Results Pending)     XR CHEST PORTABLE   Final Result   1. Increased pneumonia throughout the right lung remaining most prominent in   the right upper lobe. 2. Emphysema better demonstrated on CT. 3. Possible trace right pleural effusion.            XR CHEST PORTABLE   Final Result   Improved aeration of the right chest with persistent consolidation in the mid   to upper right chest.                All pertinent images and reports for the current Hospitalization were reviewed by me.        Scheduled Meds:   calcium gluconate-NaCl  1,000 mg IntraVENous Once    oxymetazoline  2 spray Each Nostril BID    sodium chloride (Inhalant)  15 mL Nebulization Q4H While awake    ipratropium-albuterol  1 ampule Inhalation Q4H    fluconazole  100 mg Oral Daily    [Held by provider] enoxaparin  40 mg SubCUTAneous Daily    meropenem  1,000 mg IntraVENous Q8H    [Held by provider] aspirin  81 mg Oral Daily    nystatin  5 mL Oral 4x Daily    [Held by provider] prasugrel  10 mg Oral Daily    sodium chloride flush  5-40 mL IntraVENous 2 times per day    atorvastatin  40 mg Oral Nightly    tiZANidine  4 mg Oral Nightly    gabapentin  600 mg Oral BID    DULoxetine  60 mg Oral BID    mometasone-formoterol  2 puff Inhalation BID       Continuous Infusions:   norepinephrine 2 mcg/min (08/08/22 0647)    sodium chloride 10 mL/hr at 08/07/22 0850    dextrose         PRN Meds:  potassium chloride, sodium chloride, sodium chloride flush, sodium chloride, acetaminophen, perflutren lipid microspheres, traZODone, ipratropium-albuterol, cetirizine, albuterol sulfate HFA, glucose, dextrose bolus **OR** dextrose bolus, glucagon (rDNA), dextrose, ondansetron, polyethylene glycol      Assessment:     Patient Active Problem List   Diagnosis    Osteopenia-last dexa 2009 repeat 2015 (-2.4 hip)--advised tx    Colon polyp, hyperplastic,-(done 3/11-repeat 3-5 yrs--dr Ben Venegas)    Family history of colon cancer    CTS (carpal tunnel syndrome)BILATERAL-s/p surgical repair--resolved     Postmenopausal status-last mammogram 2/15 wnl last pap 12/13--wnl    Nondependent alcohol abuse, in remission    Urticaria, chronic    Smoking    Chronic back pain-(djd) saw dr Norbert Johns afford surgery--seeing dr Kalie Domingeuz for pain meds    Fibromyalgia    Hypercholesteremia    Lumbar spondylosis    Vitamin D Now extubated to Venturi mask  Rhino Rocket removed by ENT      Unfortunately she developed progressive changes with dense consolidation and lung abscess secondary to Pseudomonas pneumonia. Pseudomonas appears to have developed resistance to quinolones while on therapy this is concerning. CT chest on this admission grossly abnormal and definitely there is progressive changes given the severity of the -pneumonia will need IV antibiotics     OPAT d/w pt she needs to QUIT smoking d/w pt and her family        Given the lung findings will have to continue antibiotic therapy anticipate least 2 more weeks of duration of treatment    repeat sputum testing to see if Pseudomonas is clearing out on the current therapy-sputum repeat Normal fabiana noted      transferred to ICU secondary to massive epistaxis versus was  intubated on the ventilator status post bronchoscopy      Extubated on 8/4/22 and on Venturi mask has Rhinorocket in place ENT notes reviewed      Still has mild epistaxis noted better controlled ENT is following closely and the current antibiotics will provide sinus coverage     May repeat CT chest before d/c is she stays longer as in patient     Rhino Rocket removed by ENT today no further epistaxis noted tolerating antibiotic therapy okay    She is still sob and CXR Rt side still worse and given her age and severe infection risk for progression -       Labs, Microbiology, Radiology and all the pertinent results from current hospitalization and  care every where were reviewed  by me as a part of the evaluation   Plan:   Cont  IV Meropenem 1 gm q 8 HR X stop date  x  8/27  2. Can d/c Tobramycin neb today    3 . Picc line in place   4. OPAT d/w pt  5 QUIT smoking  6. Will repeat CT chest as in patient if she stays longer in house  7 Cont supportive care  8. CT images reviewed see above    9. Risk for progression  10 on o Venturi mask  11. Repeat sputum cleared   12.  Robby DONE   13, ENT following for massive Epistaxis - controlled with Rhino rocket  off blood thinners for now - [employer] removed   14. CXR not improving much is of concern risk for intubation -         Discussed with patient/Family and Nursing   Risk of Complications/Morbidity: High      Illness(es)/ Infection present that pose threat to bodily function. There is potential for severe exacerbation of infection/side effects of treatment. Therapy requires intensive monitoring for antimicrobial agent toxicity. Thanks for allowing me to participate in your patient's care and please call me with any questions or concerns.     Sisi Ambriz MD  Infectious Disease  [facility] Physician  Phone: [phone]   Fax : [phone]

## 2022-08-08 NOTE — PROGRESS NOTES
Hospitalist Progress Note      PCP: HECTOR Ocasio CNP    Date of Admission: 7/18/2022    Chief Complaint: respiratory distress    Hospital Course:    Pt is an 78y.o. year-old female with a history of hyperlipidemia, fibromyalgia, chronic pain syndrome, severe COPD with chronic hypoxic respiratory failure requiring oxygen at 4 L/min via nasal cannula continuously. She presents to the emergency room for evaluation following a 2 to 3-day history of worsening shortness of breath. She was recently treated for Pseudomonas pneumonia and discharged to rehab. EMS reports that she was on a very long oxygen tube when they arrived. She was placed on 6 L nasal cannula and her oxygen saturation improved. In the emergency room she was found to have an abnormal EKG with inverted T waves in V2 through V5. Cardiology was consulted and asked that she be put on a heparin drip. Patient denies any current or recent chest pain. She is being admitted for further evaluation and treatment. Associated signs and symptoms do not include chest pain, lightheaded, dizziness, diaphoresis, edema, orthopnea, paroxysmal nocturnal dyspnea, fever or chills. No recent medication changes. Subjective:   Hemoptysis but on endoscopy turns out to have been epistaxis. Rhinorocket taken out  No epistaxis  Short lived period on vent- now extubated.   No F/C  Some SOB  Still on venturi mask  Feeling better  No new symptoms        Medications:  Reviewed    Infusion Medications    norepinephrine 2 mcg/min (08/08/22 0647)    sodium chloride 10 mL/hr at 08/07/22 0850    dextrose       Scheduled Medications    oxymetazoline  2 spray Each Nostril BID    aspirin  325 mg Oral Once    [START ON 8/9/2022] aspirin  81 mg Oral Daily    sodium chloride (Inhalant)  15 mL Nebulization Q4H While awake    ipratropium-albuterol  1 ampule Inhalation Q4H    fluconazole  100 mg Oral Daily    [Held by provider] enoxaparin  40 mg SubCUTAneous Daily    meropenem  1,000 mg IntraVENous Q8H    nystatin  5 mL Oral 4x Daily    [Held by provider] prasugrel  10 mg Oral Daily    sodium chloride flush  5-40 mL IntraVENous 2 times per day    atorvastatin  40 mg Oral Nightly    tiZANidine  4 mg Oral Nightly    gabapentin  600 mg Oral BID    DULoxetine  60 mg Oral BID    mometasone-formoterol  2 puff Inhalation BID     PRN Meds: potassium chloride, sodium chloride, sodium chloride flush, sodium chloride, acetaminophen, perflutren lipid microspheres, traZODone, ipratropium-albuterol, cetirizine, albuterol sulfate HFA, glucose, dextrose bolus **OR** dextrose bolus, glucagon (rDNA), dextrose, ondansetron, polyethylene glycol      Intake/Output Summary (Last 24 hours) at 8/8/2022 1706  Last data filed at 8/8/2022 1640  Gross per 24 hour   Intake 2669.03 ml   Output 940 ml   Net 1729.03 ml         Exam:    BP (!) 82/52   Pulse 95   Temp 97.8 °F (36.6 °C) (Axillary)   Resp 18   Ht 5' 4\" (1.626 m)   Wt 129 lb (58.5 kg)   SpO2 100%   BMI 22.14 kg/m²     General appearance: Anxious+, appears stated age and cooperative. HEENT: Pupils equal, round, and reactive to light. Conjunctivae/corneas clear. Neck: Supple, with full range of motion. No jugular venous distention. Trachea midline. Respiratory:  Comfortably on Bipap. Crackles bibasally. Cardiovascular: Regular rate and rhythm with normal S1/S2 without murmurs, rubs or gallops. Abdomen: Soft, non-tender, non-distended with normal bowel sounds. Musculoskeletal: No clubbing, cyanosis or edema bilaterally. Full range of motion without deformity. Skin: Skin color, texture, turgor normal.  No rashes or lesions. Neurologic:  Neurovascularly intact without any focal sensory/motor deficits.  Cranial nerves: II-XII intact, grossly non-focal.  Psychiatric: Alert and oriented, thought content appropriate, normal insight  Capillary Refill: Brisk,< 3 seconds   Peripheral Pulses: +2 palpable, equal bilaterally       Labs: Recent Labs     08/06/22 0419 08/07/22  0430 08/08/22  0401   WBC 7.8 5.9 6.9   HGB 7.8* 7.2* 7.4*   HCT 23.2* 21.0* 22.3*   * 473* 444       Recent Labs     08/06/22 0419 08/07/22  0430 08/08/22  0401    138 137   K 3.8 3.7 3.7   CL 98* 97* 96*   CO2 33* 37* 36*   BUN 8 10 11   CREATININE <0.5* <0.5* <0.5*   CALCIUM 8.2* 7.9* 7.9*   PHOS 3.3  --   --        No results for input(s): AST, ALT, BILIDIR, BILITOT, ALKPHOS in the last 72 hours. No results for input(s): INR in the last 72 hours. No results for input(s): Calvin Shorts in the last 72 hours. Urinalysis:      Lab Results   Component Value Date/Time    NITRU Negative 07/22/2022 06:31 AM    WBCUA 2 07/22/2022 06:31 AM    BACTERIA None Seen 07/22/2022 06:31 AM    RBCUA 5 07/22/2022 06:31 AM    BLOODU Negative 07/22/2022 06:31 AM    SPECGRAV 1.049 07/22/2022 06:31 AM    GLUCOSEU Negative 07/22/2022 06:31 AM       Radiology:  XR CHEST PORTABLE   Final Result   Increasing opacity throughout portions of the right lung, which can reflect   combination of airspace disease, atelectasis, and pleural effusion. XR CHEST PORTABLE   Final Result   Endotracheal tube with the tip approximately 1 cm above the song. Consider   retraction by approximately 3.5 cm. Enteric tube with the tip and the side   hole below the diaphragm overlying the left upper abdomen, likely within the   fundus of the stomach. Redemonstration of multifocal pneumonia affecting the right lung. Small   right pleural effusion. XR CHEST PORTABLE   Final Result   Unchanged multifocal right-sided pneumonia. XR CHEST PORTABLE   Final Result   Stable multifocal airspace disease in the right lung with probable upper lobe   abscess. XR CHEST PORTABLE   Final Result   Stable multifocal airspace disease in the right lung, including probable   pulmonary abscess in the right upper lobe. CT CHEST WO CONTRAST   Final Result   1.  Mixed consolidative and ground-glass opacities as well as interstitial   thickening throughout the majority of the right lung compatible with   pneumonia. 2. There is a cavity in the right upper lobe demonstrating an air-fluid level   measuring up to 9.7 cm concerning for abscess formation. 3. Trace right pleural effusion. 4. Prominent and enlarged mediastinal lymph nodes, likely reactive. 5. Stable 6 mm left lower lobe pulmonary nodule. RECOMMENDATIONS:   Fleischner Society guidelines for follow-up and management of incidentally   detected pulmonary nodules:      Single Solid Nodule:      Nodule size less than 6 mm   In a low-risk patient, no routine follow-up. In a high-risk patient, optional CT at 12 months. Nodule size equals 6-8 mm   In a low-risk patient, CT at 6-12 months, then consider CT at 18-24 months. In a high-risk patient, CT at 6-12 months, then CT at 18-24 months. Nodule size greater than 8 mm         In a low-risk patient, consider CT at 3 months, PET/CT, or tissue sampling. In a high-risk patient, consider CT at 3 months, PET/CT, or tissue sampling. Multiple Solid Nodules:      Nodule size less than 6 mm   In a low-risk patient, no routine follow-up. In a high-risk patient, optional CT at 12 months. Nodule size equals 6-8 mm   In a low-risk patient, CT at 3-6 months, then consider CT at 18-24 months. In a high-risk patient, CT at 3-6 months, then CT at 18-24 months. Nodule size greater than 8 mm   In a low-risk patient, CT at 3-6 months, then consider CT at 18-24 months. In a high-risk patient, CT at 3-6 months, then CT at 18-24 months. - Low risk patients include individuals with minimal or absent history of   smoking and other known risk factors. - High risk patients include individuals with a history or smoking or known   risk factors. Radiology 2017 http://pubs. rsna.org/doi/full/10.1148/radiol. 7610927979         XR CHEST PORTABLE Final Result   1. Increased pneumonia throughout the right lung remaining most prominent in   the right upper lobe. 2. Emphysema better demonstrated on CT. 3. Possible trace right pleural effusion. XR CHEST PORTABLE   Final Result   Improved aeration of the right chest with persistent consolidation in the mid   to upper right chest.         CT CHEST WO CONTRAST    (Results Pending)   Fluoroscopy modified barium swallow with video    (Results Pending)           Assessment/Plan:    Active Hospital Problems    Diagnosis Date Noted    Acute respiratory insufficiency [R06.89] 08/04/2022     Priority: Medium    Acute blood loss anemia [D62] 08/04/2022     Priority: Medium    Hypotension due to drugs [I95.2] 08/04/2022     Priority: Medium    Massive hemoptysis [R04.2] 08/03/2022     Priority: Medium    Epistaxis [R04.0] 08/03/2022     Priority: Medium    Peripherally inserted central catheter (PICC) in place [Z45.2] 07/31/2022     Priority: Medium    NSTEMI (non-ST elevated myocardial infarction) ([address] [I21.4] 07/26/2022     Priority: Medium    Necrotizing pneumonia ([address] [J85.0] 07/26/2022     Priority: Medium    Multifocal pneumonia [J18.9] 07/26/2022     Priority: Medium    Pseudomonas respiratory infection [J98.8, B96.5] 07/26/2022     Priority: Medium    Abnormal CT of the chest [R93.89] 07/26/2022     Priority: Medium    Acute on chronic respiratory failure with hypoxia and hypercapnia (HCC) [J96.21, J96.22] 07/18/2022     Priority: Medium    Abnormal EKG [R94.31] 07/18/2022     Priority: Medium    Chronic obstructive pulmonary disease ([address] [J44.9]      Priority: Medium    Pneumonia of right lung due to infectious organism [J18.9] 07/03/2022     Priority: Medium    COPD, severe ([address] [J44.9] 02/24/2017    Chronic pain syndrome [G89.4] 03/14/2016    Hypercholesteremia [E78.00] 11/04/2013    Fibromyalgia [M79.7]     Smoking [F17.200] 08/27/2011     Acute on chronic hypoxic respiratory failure     2. 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 in situ   -CT surgery consulted:  would need pneumonectomy for which she is a poor candidate --> medical therapy only  - Respiratory sputum culture repeated on 7/30 without pseudomonnas  -  3. Epistaxis- resolved. Right nare Rhinorocket Dcd.  -DC lovenox.  -Keep ASA and prasugrel going  -HnH q8  -Transfuse PRBCs if hb is below 7.0  -Transfer out of ICU in next 24hrs        4. Critical coronary artery disease single-vessel  Status post PCI to left circumflex by Dr Noble Singh on this admission  with stent  Now on antiplatelet therapy     5. Anemia, AOCD. Now has hemoptysis. FU iron studies TIBC ferritin % and reticulocyte count  No indication to transfusion yet       COPD, severe ([address] - without acute exacerbation. Will provide Nebulizer treatments as needed and continue home medications. Patient will be monitored closely, and deep breathing and coughing will be encouraged while awake. Hyperlipidemia - No current evidence of Rhabdomyolysis or other adverse effects. Continue statin therapy while in the hospital     Chronic pain syndrome -continue her home pain med regimen     Fibromyalgia - provide supportive care    DVT Prophylaxis: heparin  Diet: ADULT DIET; Dysphagia - Pureed; Moderately Thick (Honey)  ADULT ORAL NUTRITION SUPPLEMENT; Lunch, Dinner; Frozen Oral Supplement  Code Status: Full Code    PT/OT Eval Status: evaluating    Dispo; Transfer out of ICU.  Can be Dcd in am.    Trip Quiñones MD

## 2022-08-08 NOTE — PROGRESS NOTES
Cardiology Progress    Porfirio Gonzalez  1945    PCP: HECTOR Barboza - CNP  Referring Physician: Dr Tera Mccracken  Reason for Referral: Elevated troponin  Chief Complaint:   Chief Complaint   Patient presents with    Shortness of Breath     At [facility] for rehab for pneumonia increased sob over the last few days. Pt on 4-5 lpm via nc all the time. Per EMS the cord was very long ems placed on 6lpm via nc and o2 sat came up to 99%       Interval history:  Massive epistaxis requiring intubation/ now extubated   Necrotizing pneumonia  Off antiplatelets for ~ 5 days   Very lethargic unable to answer any questions       Subjective:      History of Present Illness: The patient is 78 y.o. female with a past medical history significant for COPD on 4 L oxygen at home, HLD, depression,  who presents with SOB. Patient was recently hospitalized during 2 weeks for Pseudomonas pneumonia and was discharged 4 days ago to our facility. Patient shortness of breath did not improve despite antibiotic therapy and was asked to go to the hospital again. EMS placed the patient on 6 L nasal canula on arrival.  On evaluation in the emergency department EKG showed what T wave inversion in V2-5. Aspirin and heparin was started. Patient has not had chest pain since admission. Patient was also started on breathing treatment, Cefepime and azithromycin. On my evaluation patient was on 5 L on oxygen with SpO2 >90%. Patient looked tired and short of breath when she talks. Patient denies any  chest pain, palpitations, syncope, orthopnea. Past Medical History:   Diagnosis Date    CAD (coronary artery disease) 07/19/2022    NSTEMI, PCI LCx    Chronic pain syndrome     Percocet.  Dr. Bebeto Eckert    Colorectal polyps     COPD (chronic obstructive pulmonary disease) [address]     CTS (carpal tunnel syndrome)     BILATERAL    DDD (degenerative disc disease), lumbar     Depression     Family hx of colon cancer     Fibromyalgia Hyperlipemia     IBS (irritable bowel syndrome)     Lumbar spondylosis     New onset type 2 diabetes mellitus ([address] 02/03/2020    On home O2     4L    Pneumonia 07/2022    P.aer    Urticaria, chronic      Past Surgical History:   Procedure Laterality Date    BRONCHOSCOPY N/A 8/3/2022    BRONCHOSCOPY performed by Evelin Birmingham DO at [facility] Bilateral     CATARACT EXTRACTION      CERVICAL POLYP REMOVAL  09/2004    CORONARY ANGIOPLASTY WITH STENT PLACEMENT  07/19/2022    LCx 99. PCI/stent.     INTRACAPSULAR CATARACT EXTRACTION Left 06/23/2020    Phacoemulsification with intraocular lens implant performed by Cody Meyers MD at [address] Right 07/14/2020    Phacoemulsification with intraocular lens implant performed by Cody Meyers MD at [address]      patient denies      Family History   Problem Relation Age of Onset    Cancer Father         COLON     Alzheimer's Disease Mother     Heart Disease Brother     Heart Failure Brother     Diabetes Daughter     Diabetes Daughter     Diabetes Daughter      Social History     Tobacco Use    Smoking status: Every Day     Packs/day: 1.00     Years: 55.00     Pack years: 55.00     Types: Cigarettes     Start date: 1/1/1962    Smokeless tobacco: Never    Tobacco comments:     almost ready to quit   Vaping Use    Vaping Use: Never used   Substance Use Topics    Alcohol use: No     Alcohol/week: 0.0 standard drinks    Drug use: No       No Known Allergies  Current Facility-Administered Medications   Medication Dose Route Frequency Provider Last Rate Last Admin    oxymetazoline (AFRIN) 0.05 % nasal spray 2 spray  2 spray Each Nostril BID HECTOR Duran CNP   2 spray at 08/08/22 1309    aspirin EC tablet 325 mg  325 mg Oral Once Yeison Vivar MD        [START ON 8/9/2022] aspirin chewable tablet 81 mg  81 mg Oral Daily Nolene Blizzard, MD        sodium chloride (Inhalant) 3 % nebulizer solution 15 mL  15 mL Nebulization Q4H While awake Teresia Velasquez, DO   15 mL at 08/08/22 1136    potassium chloride 20 mEq/50 mL IVPB (Central Line)  20 mEq IntraVENous PRN Gareth Chong,         ipratropium-albuterol (DUONEB) nebulizer solution 1 ampule  1 ampule Inhalation Q4H HECTOR Nazario CNP   1 ampule at 08/08/22 1136    norepinephrine (LEVOPHED) 16 mg in dextrose 5% 250 mL infusion  1-100 mcg/min IntraVENous Continuous HECTOR Nazario CNP 1.9 mL/hr at 08/08/22 0647 2 mcg/min at 08/08/22 0647    fluconazole (DIFLUCAN) tablet 100 mg  100 mg Oral Daily Viktoria Mensah MD   100 mg at 08/08/22 2664    sodium chloride (OCEAN, BABY AYR) 0.65 % nasal spray 1 spray  1 spray Each Nostril PRN Nellie Seo MD        [facility] [place] by provider] enoxaparin (LOVENOX) injection 40 mg  40 mg SubCUTAneous Daily HECTOR Nazario CNP   40 mg at 08/03/22 5774    meropenem (MERREM) 1,000 mg in sodium chloride 0.9 % 100 mL IVPB (mini-bag)  1,000 mg IntraVENous Q8H Rosa Hernandez MD 33.3 mL/hr at 08/08/22 1312 1,000 mg at 08/08/22 1312    [Held by provider] aspirin EC tablet 81 mg  81 mg Oral Daily HECTOR Anthony CNP   81 mg at 08/03/22 0023    nystatin (MYCOSTATIN) 704177 UNIT/ML suspension 500,000 Units  5 mL Oral 4x Daily HECTOR Forde CNP   500,000 Units at 08/08/22 1310    [Held by provider] prasugrel (EFFIENT) tablet 10 mg  10 mg Oral Daily Nolene Blizzard, MD   10 mg at 08/03/22 6483    sodium chloride flush 0.9 % injection 5-40 mL  5-40 mL IntraVENous 2 times per day Nolene Blizzard, MD   10 mL at 08/08/22 5021    sodium chloride flush 0.9 % injection 5-40 mL  5-40 mL IntraVENous PRN Nolene Blizzard, MD        0.9 % sodium chloride infusion   IntraVENous PRN Nolene Blizzard, MD 10 mL/hr at 08/07/22 0850 New Bag at 08/07/22 0850    acetaminophen (TYLENOL) tablet 650 mg  650 mg Oral Q4H PRN Nolene Blizzard, MD   316 mg at 08/07/22 1408    perflutren lipid microspheres (DEFINITY) injection 1.65 mg  1.5 mL IntraVENous ONCE PRN Ravinder Self MD        atorvastatin (LIPITOR) tablet 40 mg  40 mg Oral Nightly Ravinder Self MD   40 mg at 08/07/22 2012    traZODone (DESYREL) tablet 100 mg  100 mg Oral Nightly PRN Donnie Santiago MD   100 mg at 08/07/22 2012    tiZANidine (ZANAFLEX) tablet 4 mg  4 mg Oral Nightly Donnie Santiago MD   4 mg at 08/07/22 2012    ipratropium-albuterol (DUONEB) nebulizer solution 1 ampule  1 ampule Inhalation Q4H PRN Donnie Santiago MD   1 ampule at 07/27/22 1229    gabapentin (NEURONTIN) capsule 600 mg  600 mg Oral BID Donnie Santiago MD   600 mg at 08/08/22 9639    DULoxetine (CYMBALTA) extended release capsule 60 mg  60 mg Oral BID Donnie Santiago MD   60 mg at 08/08/22 9033    cetirizine (ZYRTEC) tablet 10 mg  10 mg Oral Daily PRN Donnie Santiago MD   10 mg at 07/20/22 1018    albuterol sulfate HFA (PROVENTIL;VENTOLIN;PROAIR) 108 (90 Base) MCG/ACT inhaler 2 puff  2 puff Inhalation Q6H PRN Donnie Santiago MD   2 puff at 08/03/22 0759    glucose chewable tablet 16 g  4 tablet Oral PRN Donnie Santiago MD        dextrose bolus 10% 125 mL  125 mL IntraVENous PRN Donnie Santiago MD        Or    dextrose bolus 10% 250 mL  250 mL IntraVENous PRN Donnie Santiago MD        glucagon (rDNA) injection 1 mg  1 mg IntraMUSCular PRN Donnie Santiago MD        dextrose 5 % solution  100 mL/hr IntraVENous PRN Donnie Santiago MD        ondansetron [facility]) injection 4 mg  4 mg IntraVENous Q4H PRN Donnie Santiago MD   4 mg at 08/07/22 2015    polyethylene glycol (GLYCOLAX) packet 17 g  17 g Oral Daily PRN Donnie Santiago MD        mometasone-formoterol [facility]) 200-5 MCG/ACT inhaler 2 puff  2 puff Inhalation BID Singh Jim, [facility]   2 puff at 08/08/22 0900       Review of Systems:  Unable to obtain    Physical Exam:   BP (!) 96/58   Pulse (!) 101   Temp 97.7 °F (36.5 °C) (Oral)   Resp 20 Ht 5' 4\" (1.626 m)   Wt 129 lb (58.5 kg)   SpO2 100%   BMI 22.14 kg/m²   Wt Readings from Last 3 Encounters:   08/08/22 129 lb (58.5 kg)   07/13/22 133 lb 13.1 oz (60.7 kg)   06/20/22 138 lb (62.6 kg)     Constitutional: appears ill, lethargic and poorly responsive   Head: Normocephalic and atraumatic. Pupils equal and round. Neck: Neck supple. No JVP or carotid bruit appreciated. No mass and no thyromegaly present. No lymphadenopathy present. Cardiovascular: Normal rate. Normal heart sounds. Exam reveals no gallop and no friction rub. No murmur heard. Pulmonary/Chest: diffuse rhonchi   Abdominal: Soft, non-tender. Bowel sounds are normal. She exhibits no organomegaly, mass or bruit. Extremities: No edema. No cyanosis or clubbing. Pulses are 2+ radial/carotid bilaterally. Neurological: No gross cranial nerve deficit. Coordination normal.   Skin: Skin is warm and dry. There is no rash or diaphoresis. Psychiatric: She has a normal mood and affect. Her speech is normal and behavior is normal.     Lab Review:   FLP:    Lab Results   Component Value Date/Time    TRIG 146 02/17/2022 10:29 AM    HDL 59 02/17/2022 10:29 AM    LDLCALC 83 02/17/2022 10:29 AM    LDLDIRECT 176 10/12/2012 11:00 AM    LABVLDL 29 02/17/2022 10:29 AM     BUN/Creatinine:    Lab Results   Component Value Date/Time    BUN 11 08/08/2022 04:01 AM    CREATININE <0.5 08/08/2022 04:01 AM     PT/INR, TNI, HGB A1C:   Lab Results   Component Value Date/Time    TROPONINI 0.52 (HH) 07/18/2022 10:29 PM    LABA1C 6.0 07/03/2022 08:26 PM      No results found for: CBCAUTODIF    EKG Interpretation: Sinus rhythm, HR 98, no axis deviation, new T wave inversion V2-5. Echo: None    Stress Test: 2006 No ischemia noted.      Cath:    ANGIOGRAM/CORONARY ARTERIOGRAM:        The left circumflex artery has mild disease              Om1 99%        INTERVENTION  Guide catheter: XB 3.5 Guide  Runthrough wire used to cross OM1 lesion  Predilation with 3.0 regular melissajuanis  IVUS passed to distal OM, reference diameter 3 mm  Haily 3.0 X 15 mm KIRILL  Post dilation with 3.0 NC balloon to 15 ALEXEI      SUMMARY:   Single vessel CAD  S/p successful IVUS guided PCI OM1 X 1 KIRILL     All above diagnostic testing and laboratory data was independently visualized and reviewed by me (not simply review of report)       Assessment and Plan   1) NSTEMI  -s/p PCI Lcx  - restart asa today   - hold effient for another 48 hrs   - discussed with ICU team and ENT     2) epistaxis  - disucssed with Dr. Rachel Jernigan, cleared to restart aspirin     Long discussion with family regarding risks of continuing to hold antiplatelets as well as the risk of restarting, due to her rapid decline a meeting is being held with palliative care and hospice is being considered.     Ondina Dalal MD [address] [place] [address], Interventional Cardiology, and Peripheral Vascular Disease   [address]   (O): [phone]  (F): [phone]

## 2022-08-08 NOTE — PROGRESS NOTES
Physical Examination:     Gen:  acutely ill appearing  Eyes: PERRL. Anicteric sclera. No conjunctival injection. ENT: No discharge. Posterior oropharynx clear. External appearance of ears and nose normal.  Neck: Trachea midline. No mass   Resp:  severe rhonchi and diminished on the right, no wheezing, mild increase wob  CV: Regular rate. Regular rhythm. No murmur or rub. No edema. GI: Soft, Non-tender. Non-distended. +BS  Skin: Warm, dry, w/o erythema. Lymph: No cervical or supraclavicular LAD. M/S: No cyanosis. No clubbing. Neuro:  CN 2-12 tested, no focal neurologic deficit. Moves all extremities  Psych: Awake and alert, Oriented x 3. Judgement and insight appropriate. Mood stable. Laboratory findings:-    CBC:   Recent Labs     08/08/22  0401   WBC 6.9   HGB 7.4*        BMP:    Recent Labs     08/06/22  0419 08/07/22  0430 08/08/22  0401    138 137   K 3.8 3.7 3.7   CL 98* 97* 96*   CO2 33* 37* 36*   BUN 8 10 11   CREATININE <0.5* <0.5* <0.5*   GLUCOSE 110* 123* 170*     S. Calcium:  Recent Labs     08/08/22  0401   CALCIUM 7.9*       S. Magnesium:  Recent Labs     08/06/22  0419   MG 1.70*     S. Phosphorus:  Recent Labs     08/06/22  0419   PHOS 3.3         Radiology Review:  Pertinent images / reports were reviewed as a part of this visit.   CXR reviewed extensive right airspace disease    CC time 35 min                    Layla Crockett MD, M.BEAU.            8/8/2022, 12:55 PM

## 2022-08-08 NOTE — PROGRESS NOTES
This RN to bedside, pt increasingly lethargic. Responds to voice, though unable to keep attention. Epi RN, to bedside. Called Olivia Thomas APRN - ABG ordered. Olga Lidia SANABRIA, to bedside to draw ABG. ABG results relayed to [address] [place] [address]. Will continue to monitor.

## 2022-08-08 NOTE — PROGRESS NOTES
Speech Language Pathology  [facility]   Speech Therapy  Daily Dysphagia Treatment Note    Janelle Hamilton  AGE: 78 y.o.    GENDER: female  : 1945  [id number]  EPISODE DATE:  2022    Patient Active Problem List   Diagnosis    Osteopenia-last dexa  repeat  (-2.4 hip)--advised tx    Colon polyp, hyperplastic,-(done 3/11-repeat 3-5 yrs--dr Fito Mckenzie)    Family history of colon cancer    CTS (carpal tunnel syndrome)BILATERAL-s/p surgical repair--resolved     Postmenopausal status-last mammogram 2/15 wnl last pap --wnl    Nondependent alcohol abuse, in remission    Urticaria, chronic    Smoking    Chronic back pain-(djd) saw dr Mar Morales afford surgery--seeing dr Erich Prader for pain meds    Fibromyalgia    Hypercholesteremia    Lumbar spondylosis    Vitamin D deficiency--severe--started 50,000 iu 2x/ wk     Insomnia--on elevil w help    Constipation--on daily miralax    Depression    Chronic pain syndrome    Muscle cramping    Acute on chronic respiratory failure with hypercapnia ([address]    ROLY (acute kidney injury) ([address]    Severe sepsis (HCC)    Gastroesophageal reflux disease    COPD, severe ([address]    Chronic hypoxemic respiratory failure ([address]    Degeneration of lumbar or lumbosacral intervertebral disc    Trochanteric bursitis of right hip    COPD exacerbation ([address]    Diabetes ([address]    Controlled type 2 diabetes mellitus without complication, without long-term current use of insulin ([address]    Age-related osteoporosis without current pathological fracture- started alendronate 2021    Pulmonary nodule seen on imaging study    Pneumonia of right lung due to infectious organism    General weakness    Elevated lactic acid level    Community acquired pneumonia of right lung    Chronic obstructive pulmonary disease ([address]    Acute respiratory failure with hypoxia (HCC)    Acute on chronic respiratory failure with hypoxia and hypercapnia (HCC)    Abnormal EKG    NSTEMI (non-ST elevated myocardial infarction) [facility]    Necrotizing pneumonia (HCC)    Multifocal pneumonia    Pseudomonas respiratory infection    Abnormal CT of the chest    Peripherally inserted central catheter (PICC) in place    Massive hemoptysis    Epistaxis    Acute respiratory insufficiency    Acute blood loss anemia    Hypotension due to drugs     No Known Allergies    Treatment Diagnosis: Dysphagia     CHART REVIEW:  7/18/2022 admitted with c/o SOB and increased O2 demand  MD ADMISSION H&P HPI: Pt is an 78y.o. year-old female with a history of hyperlipidemia, fibromyalgia, chronic pain syndrome, severe COPD with chronic hypoxic respiratory failure requiring oxygen at 4 L/min via nasal cannula continuously. She presents to the emergency room for evaluation following a 2 to 3-day history of worsening shortness of breath. She was recently treated for Pseudomonas pneumonia and discharged to rehab. EMS reports that she was on a very long oxygen tube when they arrived. She was placed on 6 L nasal cannula and her oxygen saturation improved. In the emergency room she was found to have an abnormal EKG with inverted T waves in V2 through V5. Cardiology was consulted and asked that she be put on a heparin drip. Patient denies any current or recent chest pain. She is being admitted for further evaluation and treatment. Associated signs and symptoms do not include chest pain, lightheaded, dizziness, diaphoresis, edema, orthopnea, paroxysmal nocturnal dyspnea, fever or chills. No recent medication changes    7/27/2022 rapid response: txfer to ICU , placed on BiPAP. PNA progressive and persistent, with cavitary/necrotizing component, R side    8/3/2022: txfer back to ICU, hemoptysis actually found to be massive epistaxis. Intubated. Pulmonology and ENT consulted, rhino rocket placed. 8/4/2022: extubated    IMAGING:  CT CHEST: 7/23/2022  Impression   1.  Mixed consolidative and ground-glass opacities as well as interstitial   thickening throughout the majority of the right lung compatible with   pneumonia. 2. There is a cavity in the right upper lobe demonstrating an air-fluid level   measuring up to 9.7 cm concerning for abscess formation. 3. Trace right pleural effusion. 4. Prominent and enlarged mediastinal lymph nodes, likely reactive. 5. Stable 6 mm left lower lobe pulmonary nodule. Most recent CXR: 8/7/2022  Impression   Increasing opacity throughout portions of the right lung, which can reflect   combination of airspace disease, atelectasis, and pleural effusion. Subjective:     Current Diet Level:  Dysphagia I Puree ; Moderately (honey) thick liquids      Comments regarding tolerating Current Diet:   RN reports adequate tolerance  O2 via NC during meals; reports O2 mask for nasal integrity  Note CXR with worsening R opacity      Objective:     Pain: Did not state               Vision: [x]WFL []Impaired:  Hearing: [x]WFL []Impaired:     Cognitive/behavioral/communication:   Oriented to [x] self [] place [x] date [] situation  [x]alert []lethargic  [x]cooperative []self-limiting   []confused   []distractible []agitated []impulsive  [x]verbally responsive []nonverbal []limited verbal responses  [x]follows one step commands []does not follow dx []follows complex commands  []aphasic []dysarthric  [] other:     Respiratory Status: []Room Air [x]O2 via nasal cannula []Other:  Dentition: []Adequate [x]Dentures []Missing Many Teeth []Edentulous []Other:    Patient Positioning: Upright in chair    PO Trials:   Thin Liquids: suspect premature bolus loss to the pharynx; delayed swallow; decreased laryngeal elevation; NO overt signs/symptoms of penetration/aspiration  Nectar thick liquids: suspect premature bolus loss to the pharynx; delayed swallow; decreased laryngeal elevation; NO overt signs/symptoms of penetration/aspiration  Honey Thick liquids: suspect premature bolus loss to the pharynx; delayed swallow; decreased laryngeal elevation; NO overt signs/symptoms of penetration/aspiration  Puree: delayed swallow; decreased laryngeal elevation; NO overt signs/symptoms of penetration/aspiration   Soft food: prolonged but adequate bolus formation and movement; suspect premature bolus loss to the pharynx; delayed swallow; decreased laryngeal elevation; NO overt signs/symptoms of penetration/aspiration  Regular food: DNT    Dysphagia Tx:   Direct Dysphagia tx: tolerating current diet without overt signs/symptoms; appears to tolerate soft/thin without excess labor and without overt signs/symptoms, but noted with worsening CXR - await MD decisions re: upgrade to soft/thin vs MBS prior to upgrade  Dysphagia ex: NA  Training in compensatory strategies: needs set-up/reinforcement d/t reduced insight for dysphagia  Pt response to ex/training: agreeable and cooperative; concern for reduced insight/recall for new dysphagia learning    Goals: The patient will tolerate recommended diet without observed clinical signs of aspiration, continue  The patient will tolerate minced and moist foods 10/10., continue  The patient will tolerate mildly thick liquids without signs and symptoms of aspiration 10/10 via cup., continue  The patient/caregiver will demonstrate understanding of compensatory strategies for improved swallowing safety. continue    Assessment:   Impressions:   Pt alert, pleasant and cooperative; continues with generalized weakness. Pt is oriented x3, with prompts for name of hospital.  She follows commands and verbally responds in short sentences with mild dysphonia. Tolerating dentures placement this date  Oral and pharyngeal dysphagia characterized by prolonged but adequate bolus formation and movement with soft solids d/t decreased mastication and decreased lingual manipulation; prolonged oral transit; suspect premature bolus loss to the pharynx; delayed swallow initiation; and decreased laryngeal elevation. NO overt signs/symptoms of penetration/aspiration this date. Recommend consideration for soft/bite-sized  diet with thin liquids, but patient may benefit from MBS if there is med team concern for potential dysphagia contributing to worsening CXR. Await MD decision re: upgrade and/or MBS. ST to continue to follow. Recommended Diet and Intervention 8/8/2022:  Solids: IDDSI 6 Soft and Bite Sized Solids vs MBS prior to upgrade  Liquids: IDDSI 0 Thin Liquids vs MBS prior to upgrade  Meds: Meds whole in puree    Compensatory Swallowing Strategies:  Upright as possible with all PO intake , Small bites/sips , Remain upright 30-45 min     EDUCATION:   Provided education regarding role of SLP, results of assessment, recommendations and general speech pathology plan of care. [] Pt verbalized understanding and agreement   [x] Pt requires ongoing learning   [] No evidence of comprehension     Dysphagia Prognosis: [] good []fair []poor [x]Guarded d/t medical comorbidities       Plan:     Continue Dysphagia Therapy: YES    Interventions: Bolus Control Exercise, Diet Tolerance Monitoring , Patient/Family Education , Therapeutic Trials with SLP   Duration/Frequency of therapy while on unit: Speech therapy for dysphagia tx 3-5 times per week during acute care stay. Discharge Instructions:   Recommend ongoing SLP for dysphagia therapy upon discharge from hospital     This note serves as a D/C Summary in the event that this patient is discharged prior to the next therapy session.     Coded treatment time: 0  Total treatment time: 25    Electronically signed by MICAH Harrington on 8/8/2022 at 12:53 PM

## 2022-08-08 NOTE — PROGRESS NOTES
Occupational Therapy  Facility/Department: [facility] ICU  Occupational Therapy Re-assessment    Name: Clay Wilkerson  : 1945  MRN: [id number]  Date of Service: 2022    Discharge Recommendations:  3-5 sessions per week, Patient would benefit from continued therapy after discharge  OT Equipment Recommendations  Other: defer to [address] scored a 15/24 on the AM-PAC ADL Inpatient form. Current research shows that an AM-PAC score of 17 or less is typically not associated with a discharge to the patient's home setting. Based on the patient's AM-PAC score and their current ADL deficits, it is recommended that the patient have 3-5 sessions per week of Occupational Therapy at d/c to increase the patient's independence. Please see assessment section for further patient specific details. If patient discharges prior to next session this note will serve as a discharge summary. Please see below for the latest assessment towards goals. Patient Diagnosis(es): The primary encounter diagnosis was NSTEMI (non-ST elevated myocardial infarction) ([facility] [address] Diagnoses of Pneumonia of right lung due to infectious organism, unspecified part of lung, Hypoxia, and Abscess of upper lobe of right lung with pneumonia [facility]) were also pertinent to this visit. Past Medical History:  has a past medical history of CAD (coronary artery disease), Chronic pain syndrome, Colorectal polyps, COPD (chronic obstructive pulmonary disease) (HCC), CTS (carpal tunnel syndrome), DDD (degenerative disc disease), lumbar, Depression, Family hx of colon cancer, Fibromyalgia, Hyperlipemia, IBS (irritable bowel syndrome), Lumbar spondylosis, New onset type 2 diabetes mellitus ([facility] On home O2, Pneumonia, and Urticaria, chronic. Past Surgical History:  has a past surgical history that includes Tympanostomy tube placement; Cervical polyp removal (2004); Carpal tunnel release (Bilateral); Tubal ligation;  Intracapsular cataract extraction (Left, 06/23/2020); Intracapsular cataract extraction (Right, 07/14/2020); Coronary angioplasty with stent (07/19/2022); Cataract extraction; and bronchoscopy (N/A, 8/3/2022). Assessment   Performance deficits / Impairments: Decreased functional mobility ; Decreased ADL status; Decreased ROM; Decreased strength;Decreased endurance;Decreased balance;Decreased high-level IADLs  Assessment: Pt is a 78 y.o. female admitted from SNF with Acute on chronic respiratory failure with hypoxia and hypercapnia, NSTEMI, and necrotizing PNA. Pt recent admit with COPD Exac, ROLY, UTI and Sepsis and d/c'd on 7/13/2022 to Home at [facility] for rehab. At baseline, pt lives with significant other in apartment setting and independent ADLs, IADLs, and fxl mobility. Pt currently functioning below baseline d/t the above deficits. Today, pt required min A for transfers to stedy and use of stedy to move bed > chair d/t decreased endurance. Anticipate pt would require overall min/mod A for ADLs. Pt easily fatigued with minimal activity and requires rest breaks. Pt demonstrates need for ongoing skilled OT at d/c to maximize pt's safety and independence prior to return home. Prognosis: Fair  REQUIRES OT FOLLOW-UP: Yes  Activity Tolerance  Activity Tolerance: Patient limited by fatigue  Activity Tolerance Comments: decreased endurance; pt on NRB and able to maintain above 95% with all activity. Plan   Plan  Times per Week: 3-5  Current Treatment Recommendations: Strengthening, ROM, Balance training, Functional mobility training, Endurance training, Safety education & training, Self-Care / ADL     Restrictions  Restrictions/Precautions  Restrictions/Precautions: Fall Risk  Position Activity Restriction  Other position/activity restrictions: O2 via NRB. (no supplemental oxygen by nasal cannula per ENT);   Diet: Pureed, honey thick    Subjective   General  Chart Reviewed: Yes  Patient assessed for rehabilitation services?: Yes  Additional Pertinent Hx: Per Dr. River Yanez H&P: \"Pt is an 78y.o. year-old female with a history of hyperlipidemia, fibromyalgia, chronic pain syndrome, severe COPD with chronic hypoxic respiratory failure requiring oxygen at 4 L/min via nasal cannula continuously. She presents to the emergency room for evaluation following a 2 to 3-day history of worsening shortness of breath. She was recently treated for Pseudomonas pneumonia and discharged to rehab. EMS reports that she was on a very long oxygen tube when they arrived. She was placed on 6 L nasal cannula and her oxygen saturation improved. In the emergency room she was found to have an abnormal EKG with inverted T waves in V2 through V5. Cardiology was consulted and asked that she be put on a heparin drip. Patient denies any current or recent chest pain. She is being admitted for further evaluation and treatment. \" Pt s/p C on 7/19. 7/27 Pt with rapid response called and transferred to ICU d/t respiratory distress and placed on Bipap. Pt transferred out of ICU 7/28. 8/3 transferred to ICU again d/t hemoptysis. 8/3-8/4/2022 Pt Intubated  Family / Caregiver Present: No  Referring Practitioner: Dr. Estela Quinteros  Diagnosis: Acute on chronic respiratory failure with hypoxia and hypercapnia, NSTEMI, Necrotizing pneumonia  Subjective  Subjective: Pt seen bedside and agreeable to therapy.   General Comment  Comments: Per RN ok for therapy     Social/Functional History  Social/Functional History  Lives With: Significant other (Ray)  Type of Home: Apartment (1st floor.)  Home Layout: One level (laundry in apt)  Home Access: Level entry  Bathroom Shower/Tub: Tub/Shower unit  Bathroom Toilet: Standard  Bathroom Equipment: Shower chair  Bathroom Accessibility: Accessible  Home Equipment: Cane  ADL Assistance: [address]: Independent (Shared with sign other.)  Ambulation Assistance: Independent (Without assist device.)  Transfer Inpatient Daily Activity Raw Score: 15 (08/08/22 0907)  AM-PAC Inpatient ADL T-Scale Score : 34.69 (08/08/22 0907)  ADL Inpatient CMS 0-100% Score: 56.46 (08/08/22 0907)  ADL Inpatient CMS G-Code Modifier : CK (08/08/22 0907)    Goals  Short Term Goals  Time Frame for Short term goals: Prior to d/c:  Short Term Goal 1: Pt will bathe with SBA. Short Term Goal 2: Pt will dress with SBA. Short Term Goal 3: Pt will toilet with SBA/supervision. Short Term Goal 4: Pt will complete fxl mobility and fxl transfers to/from ADL surfaces with SBA/supervision using AD. Short Term Goal 5: Pt will tolerate standing >5 minutes for functional task with SBA/supervision while maintaining O2 sats >90% to improve standing tolerance for ADL routine. Long Term Goals  Time Frame for Long term goals : STGs=LTGs  Patient Goals   Patient goals : to finish rehab at eventually return home. Therapy Time   Individual Concurrent Group Co-treatment   Time In 0745         Time Out 0810         Minutes 25         Timed Code Treatment Minutes: 25 Minutes     This note to serve as OT d/c summary if pt is d/c-ed prior to next therapy session.     Breanna Gabriel, OTR/L

## 2022-08-08 NOTE — CARE COORDINATION
[facility]  Palliative Care   Progress Note    NAME:  Anjali Darby  MEDICAL RECORD NUMBER:  [id number]  AGE: 78 y.o. GENDER: female  : 1945  TODAY'S DATE:  2022    Subjective:  Patient more lethargic, responding less and less. Objective:    Vitals:    22 1630   BP: (!) 82/52   Pulse: 95   Resp: 18   Temp:    SpO2:      Lab Results   Component Value Date    WBC 6.9 2022    HGB 7.4 (L) 2022    HCT 22.3 (L) 2022    MCV 90.1 2022     2022     Lab Results   Component Value Date    CREATININE <0.5 (L) 2022    BUN 11 2022     2022    K 3.7 2022    CL 96 (L) 2022    CO2 36 (H) 2022     Lab Results   Component Value Date    ALT 65 (H) 2022    AST 61 (H) 2022    ALKPHOS 88 2022    BILITOT 0.5 2022       Plan: Patient more lethargic today. Attempted to take off non re breather on place on venturi mask pulse ox dropped to 70's back on non re breather. Dr Lowella Epley in discussed current situation with [facility]. Discussed code status with daughter's they have felt she was not improving and agree to no intubation/bi pap, chest compressions/shocking, resuscitative meds. They also will discuss what hospice company to use to keep her comfortable and call me tomorrow with decision. Dr Scarlet Rosario, Dr Lowella Epley informed of above orders noted. Code Status: DNR CCA do not intubate   Discharge Environment:  [x] Hospice Consult Agency: List provided   [x] Inpatient Hospice  vs  [x] Home with Hospice Care     Teaching Time:  1hours      I will continue to follow Ms. Jeannie Payne care as needed. Thank you for allowing me to participate in the care of Ms. Janine Mcdonough .      Electronically signed by Alexis Hair, RN, BSN,CHPN on 2022 at 5:23 PM  [address] [place] [address]  Office: [phone]

## 2022-08-08 NOTE — PLAN OF CARE
Problem: Discharge Planning  Goal: Discharge to home or other facility with appropriate resources  Outcome: Progressing  Flowsheets (Taken 8/7/2022 2000)  Discharge to home or other facility with appropriate resources: Identify barriers to discharge with patient and caregiver     Problem: Pain  Goal: Verbalizes/displays adequate comfort level or baseline comfort level  Outcome: Progressing     Problem: Confusion  Goal: Confusion, delirium, dementia, or psychosis is improved or at baseline  Description: INTERVENTIONS:  1. Assess for possible contributors to thought disturbance, including medications, impaired vision or hearing, underlying metabolic abnormalities, dehydration, psychiatric diagnoses, and notify attending LIP  2. [place] high risk fall precautions, as indicated  3. Provide frequent short contacts to provide reality reorientation, refocusing and direction  4. Decrease environmental stimuli, including noise as appropriate  5. Monitor and intervene to maintain adequate nutrition, hydration, elimination, sleep and activity  6. If unable to ensure safety without constant attention obtain sitter and review sitter guidelines with assigned personnel  7. Initiate Psychosocial CNS and Spiritual Care consult, as indicated  Outcome: Progressing  Flowsheets (Taken 8/7/2022 2000)  Effect of thought disturbance (confusion, delirium, dementia, or psychosis) are managed with adequate functional status: Assess for contributors to thought disturbance, including medications, impaired vision or hearing, underlying metabolic abnormalities, dehydration, psychiatric diagnoses, notify LIP     Problem: Skin/Tissue Integrity  Goal: Absence of new skin breakdown  Description: 1. Monitor for areas of redness and/or skin breakdown  2. Assess vascular access sites hourly  3. Every 4-6 hours minimum:  Change oxygen saturation probe site  4.   Every 4-6 hours:  If on nasal continuous positive airway pressure, respiratory therapy assess nares and determine need for appliance change or resting period. Outcome: Progressing     Problem: Safety - Adult  Goal: Free from fall injury  Outcome: Progressing     Problem: ABCDS Injury Assessment  Goal: Absence of physical injury  Outcome: Progressing     Problem: Chronic Conditions and Co-morbidities  Goal: Patient's chronic conditions and co-morbidity symptoms are monitored and maintained or improved  Outcome: Progressing  Flowsheets (Taken 8/7/2022 2000)  Care Plan - Patient's Chronic Conditions and Co-Morbidity Symptoms are Monitored and Maintained or Improved: Monitor and assess patient's chronic conditions and comorbid symptoms for stability, deterioration, or improvement     Problem: Nutrition Deficit:  Goal: Optimize nutritional status  Outcome: Progressing     Problem: Safety - Medical Restraint  Goal: Remains free of injury from restraints (Restraint for Interference with Medical Device)  Description: INTERVENTIONS:  1. Determine that other, less restrictive measures have been tried or would not be effective before applying the restraint  2. Evaluate the patient's condition at the time of restraint application  3. Inform patient/family regarding the reason for restraint  4.  Q2H: Monitor safety, psychosocial status, comfort, nutrition and hydration  Outcome: Progressing

## 2022-08-08 NOTE — CARE COORDINATION
Chart Reviewed. In order to return to [facility], she will need to be IV therapy Q 12 hours due to staffing. Additionally, they can only take patients with 5L of oxygen or less. Will need [address]   [place], [place]     Case Management   [phone]    8/8/2022  2:03 PM

## 2022-08-09 ENCOUNTER — APPOINTMENT (OUTPATIENT)
Dept: GENERAL RADIOLOGY | Age: 77
DRG: 246 | End: 2022-08-09
Payer: MEDICARE

## 2022-08-09 LAB
ANION GAP SERPL CALCULATED.3IONS-SCNC: 7 MMOL/L (ref 3–16)
BASOPHILS ABSOLUTE: 0.1 K/UL (ref 0–0.2)
BASOPHILS RELATIVE PERCENT: 0.8 %
BUN BLDV-MCNC: 14 MG/DL (ref 7–20)
CALCIUM SERPL-MCNC: 8.7 MG/DL (ref 8.3–10.6)
CHLORIDE BLD-SCNC: 99 MMOL/L (ref 99–110)
CO2: 36 MMOL/L (ref 21–32)
CREAT SERPL-MCNC: <0.5 MG/DL (ref 0.6–1.2)
EOSINOPHILS ABSOLUTE: 0.3 K/UL (ref 0–0.6)
EOSINOPHILS RELATIVE PERCENT: 3.4 %
GFR AFRICAN AMERICAN: >60
GFR NON-AFRICAN AMERICAN: >60
GLUCOSE BLD-MCNC: 113 MG/DL (ref 70–99)
HCT VFR BLD CALC: 24.2 % (ref 36–48)
HEMOGLOBIN: 8 G/DL (ref 12–16)
LYMPHOCYTES ABSOLUTE: 2 K/UL (ref 1–5.1)
LYMPHOCYTES RELATIVE PERCENT: 22.4 %
MAGNESIUM: 1.8 MG/DL (ref 1.8–2.4)
MCH RBC QN AUTO: 30 PG (ref 26–34)
MCHC RBC AUTO-ENTMCNC: 33.2 G/DL (ref 31–36)
MCV RBC AUTO: 90.3 FL (ref 80–100)
MONOCYTES ABSOLUTE: 1 K/UL (ref 0–1.3)
MONOCYTES RELATIVE PERCENT: 11.5 %
NEUTROPHILS ABSOLUTE: 5.4 K/UL (ref 1.7–7.7)
NEUTROPHILS RELATIVE PERCENT: 61.9 %
PDW BLD-RTO: 14.9 % (ref 12.4–15.4)
PHOSPHORUS: 3.8 MG/DL (ref 2.5–4.9)
PLATELET # BLD: 491 K/UL (ref 135–450)
PMV BLD AUTO: 6.7 FL (ref 5–10.5)
POTASSIUM REFLEX MAGNESIUM: 4.5 MMOL/L (ref 3.5–5.1)
RBC # BLD: 2.68 M/UL (ref 4–5.2)
SODIUM BLD-SCNC: 142 MMOL/L (ref 136–145)
WBC # BLD: 8.7 K/UL (ref 4–11)

## 2022-08-09 PROCEDURE — 80048 BASIC METABOLIC PNL TOTAL CA: CPT

## 2022-08-09 PROCEDURE — 94761 N-INVAS EAR/PLS OXIMETRY MLT: CPT

## 2022-08-09 PROCEDURE — 6370000000 HC RX 637 (ALT 250 FOR IP): Performed by: STUDENT IN AN ORGANIZED HEALTH CARE EDUCATION/TRAINING PROGRAM

## 2022-08-09 PROCEDURE — 6370000000 HC RX 637 (ALT 250 FOR IP): Performed by: INTERNAL MEDICINE

## 2022-08-09 PROCEDURE — 2580000003 HC RX 258: Performed by: INTERNAL MEDICINE

## 2022-08-09 PROCEDURE — 6360000002 HC RX W HCPCS: Performed by: NURSE PRACTITIONER

## 2022-08-09 PROCEDURE — APPNB15 APP NON BILLABLE TIME 0-15 MINS: Performed by: NURSE PRACTITIONER

## 2022-08-09 PROCEDURE — 71045 X-RAY EXAM CHEST 1 VIEW: CPT

## 2022-08-09 PROCEDURE — 6360000002 HC RX W HCPCS: Performed by: INTERNAL MEDICINE

## 2022-08-09 PROCEDURE — 92611 MOTION FLUOROSCOPY/SWALLOW: CPT

## 2022-08-09 PROCEDURE — 99291 CRITICAL CARE FIRST HOUR: CPT | Performed by: INTERNAL MEDICINE

## 2022-08-09 PROCEDURE — 6370000000 HC RX 637 (ALT 250 FOR IP): Performed by: NURSE PRACTITIONER

## 2022-08-09 PROCEDURE — 2500000003 HC RX 250 WO HCPCS: Performed by: NURSE PRACTITIONER

## 2022-08-09 PROCEDURE — 94640 AIRWAY INHALATION TREATMENT: CPT

## 2022-08-09 PROCEDURE — 84100 ASSAY OF PHOSPHORUS: CPT

## 2022-08-09 PROCEDURE — 83735 ASSAY OF MAGNESIUM: CPT

## 2022-08-09 PROCEDURE — 94669 MECHANICAL CHEST WALL OSCILL: CPT

## 2022-08-09 PROCEDURE — 85025 COMPLETE CBC W/AUTO DIFF WBC: CPT

## 2022-08-09 PROCEDURE — 99233 SBSQ HOSP IP/OBS HIGH 50: CPT | Performed by: INTERNAL MEDICINE

## 2022-08-09 PROCEDURE — 99232 SBSQ HOSP IP/OBS MODERATE 35: CPT | Performed by: INTERNAL MEDICINE

## 2022-08-09 PROCEDURE — 2700000000 HC OXYGEN THERAPY PER DAY

## 2022-08-09 PROCEDURE — 2000000000 HC ICU R&B

## 2022-08-09 PROCEDURE — 92526 ORAL FUNCTION THERAPY: CPT

## 2022-08-09 PROCEDURE — 74230 X-RAY XM SWLNG FUNCJ C+: CPT

## 2022-08-09 RX ORDER — POTASSIUM CHLORIDE 29.8 MG/ML
20 INJECTION INTRAVENOUS ONCE
Status: COMPLETED | OUTPATIENT
Start: 2022-08-09 | End: 2022-08-09

## 2022-08-09 RX ORDER — OXYCODONE HYDROCHLORIDE AND ACETAMINOPHEN 5; 325 MG/1; MG/1
1 TABLET ORAL ONCE
Status: COMPLETED | OUTPATIENT
Start: 2022-08-09 | End: 2022-08-09

## 2022-08-09 RX ORDER — CALCIUM GLUCONATE 20 MG/ML
1000 INJECTION, SOLUTION INTRAVENOUS ONCE
Status: COMPLETED | OUTPATIENT
Start: 2022-08-09 | End: 2022-08-09

## 2022-08-09 RX ORDER — IPRATROPIUM BROMIDE AND ALBUTEROL SULFATE 2.5; .5 MG/3ML; MG/3ML
1 SOLUTION RESPIRATORY (INHALATION) 4 TIMES DAILY
Status: DISCONTINUED | OUTPATIENT
Start: 2022-08-09 | End: 2022-08-16 | Stop reason: HOSPADM

## 2022-08-09 RX ADMIN — POTASSIUM CHLORIDE 20 MEQ: 29.8 INJECTION, SOLUTION INTRAVENOUS at 09:20

## 2022-08-09 RX ADMIN — GABAPENTIN 600 MG: 300 CAPSULE ORAL at 19:45

## 2022-08-09 RX ADMIN — NYSTATIN 500000 UNITS: 100000 SUSPENSION ORAL at 12:07

## 2022-08-09 RX ADMIN — OXYMETAZOLINE HCL 2 SPRAY: 0.05 SPRAY NASAL at 19:49

## 2022-08-09 RX ADMIN — MEROPENEM 1000 MG: 1 INJECTION, POWDER, FOR SOLUTION INTRAVENOUS at 05:15

## 2022-08-09 RX ADMIN — SODIUM CHLORIDE SOLN NEBU 3% 15 ML: 3 NEBU SOLN at 11:58

## 2022-08-09 RX ADMIN — DULOXETINE HYDROCHLORIDE 60 MG: 60 CAPSULE, DELAYED RELEASE ORAL at 19:45

## 2022-08-09 RX ADMIN — SODIUM CHLORIDE SOLN NEBU 3% 15 ML: 3 NEBU SOLN at 20:52

## 2022-08-09 RX ADMIN — MEROPENEM 1000 MG: 1 INJECTION, POWDER, FOR SOLUTION INTRAVENOUS at 21:40

## 2022-08-09 RX ADMIN — NYSTATIN 500000 UNITS: 100000 SUSPENSION ORAL at 16:26

## 2022-08-09 RX ADMIN — MEROPENEM 1000 MG: 1 INJECTION, POWDER, FOR SOLUTION INTRAVENOUS at 13:26

## 2022-08-09 RX ADMIN — OXYCODONE AND ACETAMINOPHEN 1 TABLET: 5; 325 TABLET ORAL at 21:27

## 2022-08-09 RX ADMIN — NYSTATIN 500000 UNITS: 100000 SUSPENSION ORAL at 19:46

## 2022-08-09 RX ADMIN — TIZANIDINE 4 MG: 4 TABLET ORAL at 19:46

## 2022-08-09 RX ADMIN — ACETAMINOPHEN 650 MG: 325 TABLET ORAL at 12:18

## 2022-08-09 RX ADMIN — SODIUM CHLORIDE SOLN NEBU 3% 15 ML: 3 NEBU SOLN at 08:46

## 2022-08-09 RX ADMIN — IPRATROPIUM BROMIDE AND ALBUTEROL SULFATE 1 AMPULE: 2.5; .5 SOLUTION RESPIRATORY (INHALATION) at 16:39

## 2022-08-09 RX ADMIN — MOMETASONE FUROATE AND FORMOTEROL FUMARATE DIHYDRATE 2 PUFF: 200; 5 AEROSOL RESPIRATORY (INHALATION) at 08:46

## 2022-08-09 RX ADMIN — MOMETASONE FUROATE AND FORMOTEROL FUMARATE DIHYDRATE 2 PUFF: 200; 5 AEROSOL RESPIRATORY (INHALATION) at 20:52

## 2022-08-09 RX ADMIN — OXYMETAZOLINE HCL 2 SPRAY: 0.05 SPRAY NASAL at 08:35

## 2022-08-09 RX ADMIN — ATORVASTATIN CALCIUM 40 MG: 40 TABLET, FILM COATED ORAL at 19:46

## 2022-08-09 RX ADMIN — NYSTATIN 500000 UNITS: 100000 SUSPENSION ORAL at 08:35

## 2022-08-09 RX ADMIN — DULOXETINE HYDROCHLORIDE 60 MG: 60 CAPSULE, DELAYED RELEASE ORAL at 08:35

## 2022-08-09 RX ADMIN — GABAPENTIN 600 MG: 300 CAPSULE ORAL at 08:35

## 2022-08-09 RX ADMIN — SODIUM CHLORIDE, PRESERVATIVE FREE 20 ML: 5 INJECTION INTRAVENOUS at 08:32

## 2022-08-09 RX ADMIN — IPRATROPIUM BROMIDE AND ALBUTEROL SULFATE 1 AMPULE: 2.5; .5 SOLUTION RESPIRATORY (INHALATION) at 20:52

## 2022-08-09 RX ADMIN — SODIUM CHLORIDE SOLN NEBU 3% 4 ML: 3 NEBU SOLN at 16:39

## 2022-08-09 RX ADMIN — FLUCONAZOLE 100 MG: 100 TABLET ORAL at 08:35

## 2022-08-09 RX ADMIN — SODIUM CHLORIDE, PRESERVATIVE FREE 10 ML: 5 INJECTION INTRAVENOUS at 19:51

## 2022-08-09 RX ADMIN — CALCIUM GLUCONATE 1000 MG: 20 INJECTION, SOLUTION INTRAVENOUS at 09:19

## 2022-08-09 RX ADMIN — ASPIRIN 81 MG: 81 TABLET, CHEWABLE ORAL at 08:35

## 2022-08-09 RX ADMIN — IPRATROPIUM BROMIDE AND ALBUTEROL SULFATE 1 AMPULE: 2.5; .5 SOLUTION RESPIRATORY (INHALATION) at 11:58

## 2022-08-09 RX ADMIN — IPRATROPIUM BROMIDE AND ALBUTEROL SULFATE 1 AMPULE: 2.5; .5 SOLUTION RESPIRATORY (INHALATION) at 08:45

## 2022-08-09 ASSESSMENT — PAIN - FUNCTIONAL ASSESSMENT
PAIN_FUNCTIONAL_ASSESSMENT: ACTIVITIES ARE NOT PREVENTED

## 2022-08-09 ASSESSMENT — PAIN DESCRIPTION - ONSET
ONSET: ON-GOING

## 2022-08-09 ASSESSMENT — PAIN DESCRIPTION - DESCRIPTORS
DESCRIPTORS: ACHING;DISCOMFORT
DESCRIPTORS: DISCOMFORT
DESCRIPTORS: ACHING;DISCOMFORT

## 2022-08-09 ASSESSMENT — PAIN DESCRIPTION - FREQUENCY
FREQUENCY: CONTINUOUS

## 2022-08-09 ASSESSMENT — PAIN SCALES - WONG BAKER
WONGBAKER_NUMERICALRESPONSE: 0

## 2022-08-09 ASSESSMENT — PAIN SCALES - GENERAL
PAINLEVEL_OUTOF10: 10
PAINLEVEL_OUTOF10: 4
PAINLEVEL_OUTOF10: 5
PAINLEVEL_OUTOF10: 7

## 2022-08-09 ASSESSMENT — PAIN DESCRIPTION - LOCATION
LOCATION: BACK;GENERALIZED
LOCATION: BACK;GENERALIZED
LOCATION: GENERALIZED;BACK

## 2022-08-09 ASSESSMENT — PAIN DESCRIPTION - PAIN TYPE
TYPE: CHRONIC PAIN

## 2022-08-09 ASSESSMENT — PAIN DESCRIPTION - ORIENTATION
ORIENTATION: LOWER

## 2022-08-09 NOTE — CARE COORDINATION
Chart Reveiwed. GOAl:    Return to [facility] for SNF. In order to return to SNF, she must be on oxygen at no more than 5L of oxygen. She is currently at 12L. Mona served to MD to inform. Additionally, Pt will need to be on IV ABX Q 12 hours only due to the facility staffing. Currently on Q 8 hours. Mona served to Dr Kayla Felipe to inform. Will need prior Auth with [facility]. Spoke with Dimitrios Parrish RN who reports she is not a hospice candidate at this time. Call placed to [facility] at [address] Pt to inform of above.   Nasim [place]     Case Management   [phone]    8/9/2022  1:48 PM

## 2022-08-09 NOTE — PROGRESS NOTES
Comprehensive Nutrition Assessment    Type and Reason for Visit:  Reassess    Nutrition Recommendations/Plan:   Continue Dysphagia Soft and Bite sized. Continue Magic Cup BID. Consider alternative nutrition if wanting to be aggressive with care, ongoing poor PO intakes. Malnutrition Assessment:  Malnutrition Status: At risk for malnutrition (Comment) (07/20/22 1218)    Context:  Chronic Illness     Findings of the 6 clinical characteristics of malnutrition:  Energy Intake:  Mild decrease in energy intake (Comment)  Weight Loss:  No significant weight loss     Body Fat Loss:  Mild body fat loss Orbital   Muscle Mass Loss:  No significant muscle mass loss    Fluid Accumulation:  Mild Extremities   Strength:  Not Performed    Nutrition Assessment:    Follow-up. Pt lethargic this AM and on non rebreather mask and unable to come off. Pt with no PO intakes for the past 3 days. ONS on board but no intakes noted. Pt not hospice appropriate at this time. If wanting to be aggressive, would recommned alternative nutrition given ongoing poor PO intakes. Nutrition Related Findings:    BM 7/31; Glu 113 Wound Type: Deep Tissue Injury (sacrum)       Current Nutrition Intake & Therapies:    Average Meal Intake: 0%  Average Supplements Intake: NPO  ADULT ORAL NUTRITION SUPPLEMENT; Lunch, Dinner; Frozen Oral Supplement  ADULT DIET; Dysphagia - Soft and Bite Sized    Anthropometric Measures:  Height: 5' 4\" (162.6 cm)  Ideal Body Weight (IBW): 120 lbs (55 kg)    Admission Body Weight: 135 lb 9.3 oz (61.5 kg)  Current Body Weight: 131 lb (59.4 kg), 109.2 % IBW.  Weight Source: Standing Scale  Current BMI (kg/m2): 22.5  Usual Body Weight: 139 lb (63 kg)  % Weight Change (Calculated): -4.2                    BMI Categories: Normal Weight (BMI 22.0 to 24.9) age over 72    Estimated Daily Nutrient Needs:        Energy (kcal/day): 7570-6854 (25-30 x CBW 60)     Protein (g/day): 60- 72 (1-1.2 x CBW 60)     Fluid (ml/day): per

## 2022-08-09 NOTE — RT PROTOCOL NOTE
RT Inhaler-Nebulizer Bronchodilator Protocol Note    There is a bronchodilator order in the chart from a provider indicating to follow the RT Bronchodilator Protocol and there is an Initiate RT Inhaler-Nebulizer Bronchodilator Protocol order as well (see protocol at bottom of note). CXR Findings:  XR CHEST PORTABLE    Result Date: 8/7/2022  Increasing opacity throughout portions of the right lung, which can reflect combination of airspace disease, atelectasis, and pleural effusion. The findings from the last RT Protocol Assessment were as follows:   History Pulmonary Disease:    Respiratory Pattern:    Breath Sounds:    Cough: Indication for Bronchodilator Therapy:    Bronchodilator Assessment Score:      Aerosolized bronchodilator medication orders have been revised according to the RT Inhaler-Nebulizer Bronchodilator Protocol below. Respiratory Therapist to perform RT Therapy Protocol Assessment initially then follow the protocol. Repeat RT Therapy Protocol Assessment PRN for score 0-3 or on second treatment, BID, and PRN for scores above 3. No Indications - adjust the frequency to every 6 hours PRN wheezing or bronchospasm, if no treatments needed after 48 hours then discontinue using Per Protocol order mode. If indication present, adjust the RT bronchodilator orders based on the Bronchodilator Assessment Score as indicated below. Use Inhaler orders unless patient has one or more of the following: on home nebulizer, not able to hold breath for 10 seconds, is not alert and oriented, cannot activate and use MDI correctly, or respiratory rate 25 breaths per minute or more, then use the equivalent nebulizer order(s) with same Frequency and PRN reasons based on the score. If a patient is on this medication at home then do not decrease Frequency below that used at home.     0-3 - enter or revise RT bronchodilator order(s) to equivalent RT Bronchodilator order with Frequency of every 4 hours PRN for wheezing or increased work of breathing using Per Protocol order mode. 4-6 - enter or revise RT Bronchodilator order(s) to two equivalent RT bronchodilator orders with one order with BID Frequency and one order with Frequency of every 4 hours PRN wheezing or increased work of breathing using Per Protocol order mode. 7-10 - enter or revise RT Bronchodilator order(s) to two equivalent RT bronchodilator orders with one order with TID Frequency and one order with Frequency of every 4 hours PRN wheezing or increased work of breathing using Per Protocol order mode. 11-13 - enter or revise RT Bronchodilator order(s) to one equivalent RT bronchodilator order with QID Frequency and an Albuterol order with Frequency of every 4 hours PRN wheezing or increased work of breathing using Per Protocol order mode. Greater than 13 - enter or revise RT Bronchodilator order(s) to one equivalent RT bronchodilator order with every 4 hours Frequency and an Albuterol order with Frequency of every 2 hours PRN wheezing or increased work of breathing using Per Protocol order mode. RT to enter RT Home Evaluation for COPD & MDI Assessment order using Per Protocol order mode.     Electronically signed by Gail Corbin RCP on 8/9/2022 at 2:25 AM

## 2022-08-09 NOTE — PROGRESS NOTES
Pulmonary Critical Care Progress Note     Patient's name:  Genesis Grant  Medical Record Number: [id number]  Patient's account/billing number: [de-identified]  Patient's YOB: 1945  Age: 78 y.o. Date of Admission: 7/18/2022  5:03 PM  Date of Consult: 8/9/2022      Primary Care Physician: HECTOR Marrufo CNP      Code Status: Limited    Chief complaint: acute respiratory failure with hypoxia    Assessment and Plan     Acute respiratory failure with hypoxia  Necrotizing pseudomonas PNA  Massive Epistaxis s/p Rhinorocket removed  Severe copd     Plan:  Nebulized duoneb QID and hypertonic saline  Dulera  Acapella  O2 to keep sat > 90%  Started on ASA, no bleeding,   Antibiotics per ID, meropenem  I agree with limited code status with no intubation but I believe her infection is treatable and she has underlying moderate COPD no oxygen requirement @ baseline which is not advanced to the point that she would be hospice appropriate at this point, I would like to continue infection treatment at this point.        Overnight:  Sob  Cough  Diff clearing secretions  On 15 L    REVIEW OF SYSTEMS:  Review of Systems -   General ROS: negative  Psychological ROS: negative  Ophthalmic ROS: negative  ENT ROS: mild right epistaxis   Allergy and Immunology ROS: negative  Hematological and Lymphatic ROS: negative  Endocrine ROS: negative  Breast ROS: negative  Respiratory ROS: cough and sob  Cardiovascular ROS: no chest pain or dyspnea on exertion  Gastrointestinal ROS:negative  Genito-Urinary ROS: negative  Musculoskeletal ROS: negative  Neurological ROS: negative  Dermatological ROS: negative        Physical Exam:    Vitals: /84   Pulse (!) 107   Temp 97.5 °F (36.4 °C) (Axillary)   Resp 22   Ht 5' 4\" (1.626 m)   Wt 131 lb 6.3 oz (59.6 kg)   SpO2 94%   BMI 22.55 kg/m²     Last Body weight:   Wt Readings from Last 3 Encounters:   08/09/22 131 lb 6.3 oz (59.6 kg)   07/13/22 133 lb 13.1 oz (60.7 kg)   06/20/22 138 lb (62.6 kg)       Body Mass Index : Body mass index is 22.55 kg/m². Intake and Output summary:   Intake/Output Summary (Last 24 hours) at 8/9/2022 1012  Last data filed at 8/9/2022 0800  Gross per 24 hour   Intake 304.6 ml   Output 1440 ml   Net -1135.4 ml       Physical Examination:     Gen:  acutely ill appearing, weak   Eyes: PERRL. Anicteric sclera. No conjunctival injection. ENT: No discharge. Posterior oropharynx clear. External appearance of ears and nose normal.  Neck: Trachea midline. No mass   Resp:  severe rhonchi and diminished on the right, no wheezing, mild increase wob  CV: Regular rate. Regular rhythm. No murmur or rub. No edema. GI: Soft, Non-tender. Non-distended. +BS  Skin: Warm, dry, w/o erythema. Lymph: No cervical or supraclavicular LAD. M/S: No cyanosis. No clubbing. Neuro:  CN 2-12 tested, no focal neurologic deficit. Moves all extremities  Psych: Awake and alert, Oriented x 3. Judgement and insight appropriate. Mood stable. Laboratory findings:-    CBC:   Recent Labs     08/09/22  0910   WBC 8.7   HGB 8.0*   *     BMP:    Recent Labs     08/07/22  0430 08/08/22  0401    137   K 3.7 3.7   CL 97* 96*   CO2 37* 36*   BUN 10 11   CREATININE <0.5* <0.5*   GLUCOSE 123* 170*     S. Calcium:  Recent Labs     08/08/22  0401   CALCIUM 7.9*         Radiology Review:  Pertinent images / reports were reviewed as a part of this visit.   CXR reviewed extensive right airspace disease    CC time 35 min                    Hardeep Barr MD, M.D.            8/9/2022, 10:12 AM

## 2022-08-09 NOTE — PROCEDURES
INSTRUMENTAL SWALLOW REPORT  MODIFIED BARIUM SWALLOW    NAME: Aurora Mc   : 1945  MRN: [id number]       Date of Eval: 2022     Ordering Physician: Verenice Cornelius  Radiologist: Fang Galeana     Referring Diagnosis: oropharyngeal dysphagia; r 13.12    Past Medical History:  has a past medical history of CAD (coronary artery disease), Chronic pain syndrome, Colorectal polyps, COPD (chronic obstructive pulmonary disease) ([facility] [address] CTS (carpal tunnel syndrome), DDD (degenerative disc disease), lumbar, Depression, Family hx of colon cancer, Fibromyalgia, Hyperlipemia, IBS (irritable bowel syndrome), Lumbar spondylosis, New onset type 2 diabetes mellitus ([facility] [address] On home O2, Pneumonia, and Urticaria, chronic. Past Surgical History:  has a past surgical history that includes Tympanostomy tube placement; Cervical polyp removal (2004); Carpal tunnel release (Bilateral); Tubal ligation; Intracapsular cataract extraction (Left, 2020); Intracapsular cataract extraction (Right, 2020); Coronary angioplasty with stent (2022); Cataract extraction; and bronchoscopy (N/A, 8/3/2022). CHART REVIEW:  2022 admitted with c/o SOB and increased O2 demand  MD ADMISSION H&P HPI: Pt is an 78y.o. year-old female with a history of hyperlipidemia, fibromyalgia, chronic pain syndrome, severe COPD with chronic hypoxic respiratory failure requiring oxygen at 4 L/min via nasal cannula continuously. She presents to the emergency room for evaluation following a 2 to 3-day history of worsening shortness of breath. She was recently treated for Pseudomonas pneumonia and discharged to rehab. EMS reports that she was on a very long oxygen tube when they arrived. She was placed on 6 L nasal cannula and her oxygen saturation improved. In the emergency room she was found to have an abnormal EKG with inverted T waves in V2 through V5. Cardiology was consulted and asked that she be put on a heparin drip.   Patient denies any current or recent chest pain. She is being admitted for further evaluation and treatment. Associated signs and symptoms do not include chest pain, lightheaded, dizziness, diaphoresis, edema, orthopnea, paroxysmal nocturnal dyspnea, fever or chills. No recent medication changes     7/27/2022 rapid response: txfer to ICU , placed on BiPAP. PNA progressive and persistent, with cavitary/necrotizing component, R side     8/3/2022: txfer back to ICU, hemoptysis actually found to be massive epistaxis. Intubated. Pulmonology and ENT consulted, rhino rocket placed. 8/4/2022: extubated     IMAGING:  CT CHEST: 7/23/2022  Impression   1. Mixed consolidative and ground-glass opacities as well as interstitial   thickening throughout the majority of the right lung compatible with   pneumonia. 2. There is a cavity in the right upper lobe demonstrating an air-fluid level   measuring up to 9.7 cm concerning for abscess formation. 3. Trace right pleural effusion. 4. Prominent and enlarged mediastinal lymph nodes, likely reactive. 5. Stable 6 mm left lower lobe pulmonary nodule. Most recent CXR: 8/7/2022  Impression   Increasing opacity throughout portions of the right lung, which can reflect   combination of airspace disease, atelectasis, and pleural effusion. Patient Complaints/Reason for Referral:  Ileana Dimas was referred for a MBS to assess the efficiency of his/her swallow function, assess for aspiration, and to make recommendations regarding safe dietary consistencies, effective compensatory strategies, and safe eating environment. Patient complaints: med team concern for dysphagia/aspiration    Onset of problem:   Date of Onset: 87/18/2022    Behavior/Cognition/Vision/Hearing:  Behavior/Cognition: Alert; Cooperative;Pleasant mood  Vision Exceptions: Wears glasses for reading  Hearing: Within functional limits    Impressions:  Treatment Dx and ICD 10: oropharyngeal dysphagia; r 13.12    Patient Position: Lateral (Fully upright in VSE chair)    Consistencies Administered: Pureed; Moderately Thick cup;Mildly Thick straw; Thin straw;Soft & Bite Sized; Regular    Moderate oral stage dysphagia characterized by decreased mastication and decreased lingual manipulation. Prolonged bolus formation and movement with all. Concern for excess labor/effort with regular solids. Patient maintains good oral control without premature loss to the pharynx. Mild pharyngeal stage dysphagia characterized by delayed swallow, decreased laryngeal elevation, and decreased pharyngeal peristalsis. No penetration/aspiration. No significant residue. Of note, patient met in fluoroscopy suite with reported increased O2 demand prior to MBS. Proceeded with MBS d/t med team concern for potential for silent aspiration. No penetration/aspiration but patient does appear at high risk for compromised/diminished resp status with exertion with PO; recommend PO only as tolerated with frequent rest breaks PRN. Recommended Diet:  Solid consistency: Soft and Bite-Sized  Liquid consistency: Thin  Liquid administration via: Cup;Straw  Medication administration:  (PO vs meds in puree)  Compensatory Swallowing Strategies : Upright as possible for all oral intake;Remain upright for 30-45 minutes after meals (rest breaks PRN d/t high risk for overexertion)    Recommendations/Treatment  Requires SLP Intervention: Yes  D/C Recommendations: Ongoing speech therapy is recommended at next level of care  Therapeutic Interventions: Diet tolerance monitoring;Oral motor exercises; Bolus control exercises; Therapeutic PO trials with SLP;Patient/Family education    Education:   Patient Education: Completed on results/recs/plan  Patient Education Response: Verbalizes understanding    Goals:    Dysphagia Goals: The patient will tolerate recommended diet without observed clinical signs of aspiration; The patient/caregiver will demonstrate understanding of compensatory strategies for improved swallowing safety. ;The patient will tolerate soft and bite sized foods 10/10. ;The patient will improve oral motor function via therapeutic oral motor exercises to 5/5 each trial.;The patient will improve oral preparation phase via bolus control/manipulation exercises to 5/5 each trial.      Oral Preparation / Oral Phase  decreased mastication   decreased lingual manipulation. prolonged bolus formation and movement with all; concern for excess labor/effort with regular solids  maintains good oral control without premature loss to the pharynx    Pharyngeal Phase  delayed swallow  decreased laryngeal elevation  decreased pharyngeal peristalsis. no penetration/aspiration. no significant residue. Upper Esophageal Phase  DOMINIK      Pain   Pain: did not state      Therapy Time:  2428-8267  30 minutes      Javier James, #[id number]  Speech-Language Pathologist  Portable phone: [phone]  8/9/2022, 10:30 AM

## 2022-08-09 NOTE — ACP (ADVANCE CARE PLANNING)
Advance Care Planning     Advance Care Planning Activator (Inpatient)  Conversation Note      Date of ACP Conversation: 7/19/2022     Conversation Conducted with: Patient with Decision Making Capacity    ACP Activator: Aneta Schmidt RN    Health Care Decision Maker:     Current Designated Health Care Decision Maker:     Primary Decision Maker: Rafael Edwin Child - [phone]    Care Preferences    Ventilation: \"If you were in your present state of health and suddenly became very ill and were unable to breathe on your own, what would your preference be about the use of a ventilator (breathing machine) if it were available to you? \"      Would the patient desire the use of ventilator (breathing machine)?: yes    \"If your health worsens and it becomes clear that your chance of recovery is unlikely, what would your preference be about the use of a ventilator (breathing machine) if it were available to you? \"     Would the patient desire the use of ventilator (breathing machine)?: No      Resuscitation  \"CPR works best to restart the heart when there is a sudden event, like a heart attack, in someone who is otherwise healthy. Unfortunately, CPR does not typically restart the heart for people who have serious health conditions or who are very sick. \"    \"In the event your heart stopped as a result of an underlying serious health condition, would you want attempts to be made to restart your heart (answer \"yes\" for attempt to resuscitate) or would you prefer a natural death (answer \"no\" for do not attempt to resuscitate)? \" yes       [] Yes   [x] No   Educated Patient / Hermila Woo regarding differences between Advance Directives and portable DNR orders.     Length of ACP Conversation in minutes:  5    Conversation Outcomes:  [x] ACP discussion completed  [] Existing advance directive reviewed with patient; no changes to patient's previously recorded wishes  [] New Advance Directive completed  [] Portable Do Not
Advance Care Planning     Advance Care Planning Inpatient Note  [facility] Department    Today's Date: 7/20/2022  Unit: [facility] 4[facility] MED SURG    Received request from IDT Member. Upon review of chart and communication with care team, patient's decision making abilities are not in question. . Patient and Friends was/were present in the room during visit. Goals of ACP Conversation:  Discuss advance care planning documents  Facilitate a discussion related to patient's goals of care as they align with the patient's values and beliefs. Health Care Decision Makers:       Primary Decision Maker: Benjamin Gibson - Child - [phone]    Secondary Decision Maker: Ainsley Licona - Child - [phone]    Supplemental (Other) Decision Maker: Ana Monzon - Domestic Partner - [phone]  Summary:  Completed New Documents  Updated Healthcare Decision Maker    Advance Care Planning Documents (Patient Wishes):  Healthcare Power of /Advance Directive Appointment of Health Care Agent  Living Will/Advance Directive     Assessment:  Patient awake and alert, sitting in chair following lunch. Partner Senait Canas is at bedside. Patient is not feeling well and is still hoarse but she wants to complete advance directives. Patient has four adult children but specified her two daughters as Braydon Bradshaw who is RN and Gabriela Brown, with Senait Canas as third. Interventions:  Provided education on documents for clarity and greater understanding  Discussed and provided education on state decision maker hierarchy  Assisted in the completion of documents according to patient's wishes at this time  Encouraged ongoing ACP conversation with future decision makers and loved ones    Care Preferences Communicated:   No    Outcomes/Plan:  ACP Discussion: Completed  New advance directive completed.   Returned original document(s) to patient, as well as copies for distribution to appointed agents  Copy of advance directive given to staff to scan into medical
Outcomes:  [x] ACP discussion completed    Electronically signed by Antonio ORTIZ, RN, [facility] on 8/9/2022 at 4:24 PM  Palliative Care Nurse  Phone [phone]

## 2022-08-09 NOTE — CARE COORDINATION
[facility]  Palliative Care   Progress Note    NAME:  Vern Huang  MEDICAL RECORD NUMBER:  [id number]  AGE: 78 y.o. GENDER: female  : 1945  TODAY'S DATE:  2022    Subjective: Patient awake today, but pleasantly confused oriented to place an self. Objective:    Vitals:    22 1201   BP:    Pulse: (!) 102   Resp: 21   Temp:    SpO2: 100%     Lab Results   Component Value Date    WBC 8.7 2022    HGB 8.0 (L) 2022    HCT 24.2 (L) 2022    MCV 90.3 2022     (H) 2022     Lab Results   Component Value Date    CREATININE <0.5 (L) 2022    BUN 14 2022     2022    K 4.5 2022    CL 99 2022    CO2 36 (H) 2022     Lab Results   Component Value Date    ALT 65 (H) 2022    AST 61 (H) 2022    ALKPHOS 88 2022    BILITOT 0.5 2022       Plan: I have spoken to patient daughter/MAGALI Sainz she is glad to hear her mother is improved from yesterday but is wondering where to go from here. We have discussed continuing with original plan of ECF with antibiotics and therapy. At this time she agrees hospice is not appropriate course of action unless her mother continues to decline. Lalita's children are planning on visiting her tonight as they have not been together for some time. Code Status: Limited  Discharge Environment:    [x] ECF skilled care     Teaching Time:  0hours  40 min     I will continue to follow Ms. Carlos Enrique Johnson care as needed. Thank you for allowing me to participate in the care of Ms. Heather Vega .      Electronically signed by Regla Mccann, RN, BSN,CHPN on 2022 at 12:11 PM  [address] [place] [address]  Office: [phone]

## 2022-08-09 NOTE — PLAN OF CARE
Problem: Discharge Planning  Goal: Discharge to home or other facility with appropriate resources  Outcome: Progressing  Flowsheets (Taken 8/9/2022 0800)  Discharge to home or other facility with appropriate resources:   Identify barriers to discharge with patient and caregiver   Arrange for needed discharge resources and transportation as appropriate   Identify discharge learning needs (meds, wound care, etc)     Problem: Pain  Goal: Verbalizes/displays adequate comfort level or baseline comfort level  Outcome: Progressing     Problem: Confusion  Goal: Confusion, delirium, dementia, or psychosis is improved or at baseline  Description: INTERVENTIONS:  1. Assess for possible contributors to thought disturbance, including medications, impaired vision or hearing, underlying metabolic abnormalities, dehydration, psychiatric diagnoses, and notify attending LIP  2. [place] high risk fall precautions, as indicated  3. Provide frequent short contacts to provide reality reorientation, refocusing and direction  4. Decrease environmental stimuli, including noise as appropriate  5. Monitor and intervene to maintain adequate nutrition, hydration, elimination, sleep and activity  6. If unable to ensure safety without constant attention obtain sitter and review sitter guidelines with assigned personnel  7. Initiate Psychosocial CNS and Spiritual Care consult, as indicated  Outcome: Progressing     Problem: Safety - Adult  Goal: Free from fall injury  Outcome: Progressing     Problem: ABCDS Injury Assessment  Goal: Absence of physical injury  Outcome: Progressing     Problem: Nutrition Deficit:  Goal: Optimize nutritional status  Outcome: Progressing     Problem: Safety - Medical Restraint  Goal: Remains free of injury from restraints (Restraint for Interference with Medical Device)  Description: INTERVENTIONS:  1.  Determine that other, less restrictive measures have been tried or would not be effective before applying the restraint  2. Evaluate the patient's condition at the time of restraint application  3. Inform patient/family regarding the reason for restraint  4.  Q2H: Monitor safety, psychosocial status, comfort, nutrition and hydration  Outcome: Progressing

## 2022-08-09 NOTE — PROGRESS NOTES
Hospitalist Progress Note      PCP: HECTOR Stringer CNP    Date of Admission: 7/18/2022    Chief Complaint: respiratory distress    Hospital Course:    Pt is an 78y.o. year-old female with a history of hyperlipidemia, fibromyalgia, chronic pain syndrome, severe COPD with chronic hypoxic respiratory failure requiring oxygen at 4 L/min via nasal cannula continuously. She presents to the emergency room for evaluation following a 2 to 3-day history of worsening shortness of breath. She was recently treated for Pseudomonas pneumonia and discharged to rehab. EMS reports that she was on a very long oxygen tube when they arrived. She was placed on 6 L nasal cannula and her oxygen saturation improved. In the emergency room she was found to have an abnormal EKG with inverted T waves in V2 through V5. Cardiology was consulted and asked that she be put on a heparin drip. Patient denies any current or recent chest pain. She is being admitted for further evaluation and treatment. Associated signs and symptoms do not include chest pain, lightheaded, dizziness, diaphoresis, edema, orthopnea, paroxysmal nocturnal dyspnea, fever or chills. No recent medication changes. Subjective:   No epistaxis  Short lived period on vent- now extubated.   No F/C  Some SOB  Still on venturi mask  Feeling better  No new symptoms        Medications:  Reviewed    Infusion Medications    norepinephrine 1 mcg/min (08/09/22 0704)    sodium chloride 10 mL/hr at 08/07/22 0850    dextrose       Scheduled Medications    ipratropium-albuterol  1 ampule Inhalation 4x daily    oxymetazoline  2 spray Each Nostril BID    aspirin  325 mg Oral Once    aspirin  81 mg Oral Daily    sodium chloride (Inhalant)  15 mL Nebulization Q4H While awake    fluconazole  100 mg Oral Daily    [Held by provider] enoxaparin  40 mg SubCUTAneous Daily    meropenem  1,000 mg IntraVENous Q8H    nystatin  5 mL Oral 4x Daily    [Held by provider] prasugrel  10 mg Oral Daily    sodium chloride flush  5-40 mL IntraVENous 2 times per day    atorvastatin  40 mg Oral Nightly    tiZANidine  4 mg Oral Nightly    gabapentin  600 mg Oral BID    DULoxetine  60 mg Oral BID    mometasone-formoterol  2 puff Inhalation BID     PRN Meds: potassium chloride, sodium chloride, sodium chloride flush, sodium chloride, acetaminophen, perflutren lipid microspheres, traZODone, ipratropium-albuterol, cetirizine, albuterol sulfate HFA, glucose, dextrose bolus **OR** dextrose bolus, glucagon (rDNA), dextrose, ondansetron, polyethylene glycol      Intake/Output Summary (Last 24 hours) at 8/9/2022 1100  Last data filed at 8/9/2022 0800  Gross per 24 hour   Intake 304.6 ml   Output 1440 ml   Net -1135.4 ml         Exam:    /84   Pulse (!) 107   Temp 97.5 °F (36.4 °C) (Axillary)   Resp 22   Ht 5' 4\" (1.626 m)   Wt 131 lb 6.3 oz (59.6 kg)   SpO2 94%   BMI 22.55 kg/m²     General appearance: Anxious+, appears stated age and cooperative. HEENT: Pupils equal, round, and reactive to light. Conjunctivae/corneas clear. Neck: Supple, with full range of motion. No jugular venous distention. Trachea midline. Respiratory:  Comfortably on Bipap. Crackles bibasally. Cardiovascular: Regular rate and rhythm with normal S1/S2 without murmurs, rubs or gallops. Abdomen: Soft, non-tender, non-distended with normal bowel sounds. Musculoskeletal: No clubbing, cyanosis or edema bilaterally. Full range of motion without deformity. Skin: Skin color, texture, turgor normal.  No rashes or lesions. Neurologic:  Neurovascularly intact without any focal sensory/motor deficits.  Cranial nerves: II-XII intact, grossly non-focal.  Psychiatric: Alert and oriented, thought content appropriate, normal insight  Capillary Refill: Brisk,< 3 seconds   Peripheral Pulses: +2 palpable, equal bilaterally       Labs:   Recent Labs     08/07/22  0430 08/08/22  0401 08/09/22  0910   WBC 5.9 6.9 8.7   HGB 7.2* months. In a high-risk patient, CT at 3-6 months, then CT at 18-24 months. - Low risk patients include individuals with minimal or absent history of   smoking and other known risk factors. - High risk patients include individuals with a history or smoking or known   risk factors. Radiology 2017 http://pubs. rsna.org/doi/full/10.1148/radiol. 6159583533         XR CHEST PORTABLE   Final Result   1. Increased pneumonia throughout the right lung remaining most prominent in   the right upper lobe. 2. Emphysema better demonstrated on CT. 3. Possible trace right pleural effusion.          XR CHEST PORTABLE   Final Result   Improved aeration of the right chest with persistent consolidation in the mid   to upper right chest.         CT CHEST WO CONTRAST    (Results Pending)           Assessment/Plan:    Active Hospital Problems    Diagnosis Date Noted    Acute respiratory insufficiency [R06.89] 08/04/2022     Priority: Medium    Acute blood loss anemia [D62] 08/04/2022     Priority: Medium    Hypotension due to drugs [I95.2] 08/04/2022     Priority: Medium    Massive hemoptysis [R04.2] 08/03/2022     Priority: Medium    Epistaxis [R04.0] 08/03/2022     Priority: Medium    Peripherally inserted central catheter (PICC) in place [Z45.2] 07/31/2022     Priority: Medium    NSTEMI (non-ST elevated myocardial infarction) ([address] [I21.4] 07/26/2022     Priority: Medium    Necrotizing pneumonia ([address] [J85.0] 07/26/2022     Priority: Medium    Abscess of upper lobe of right lung with pneumonia ([address] [J85.1] 07/26/2022     Priority: Medium    Pseudomonas respiratory infection [J98.8, B96.5] 07/26/2022     Priority: Medium    Abnormal CT of the chest [R93.89] 07/26/2022     Priority: Medium    Acute on chronic respiratory failure with hypoxia and hypercapnia (HCC) [J96.21, J96.22] 07/18/2022     Priority: Medium    Abnormal EKG [R94.31] 07/18/2022     Priority: Medium    Chronic obstructive pulmonary disease ([address] [J44.9]      Priority: Medium    Pneumonia of right lung due to infectious organism [J18.9] 07/03/2022     Priority: Medium    COPD, severe ([facility] [J44.9] 02/24/2017    Chronic pain syndrome [G89.4] 03/14/2016    Hypercholesteremia [E78.00] 11/04/2013    Fibromyalgia [M79.7]     Smoking [F17.200] 08/27/2011     Acute on chronic hypoxic respiratory failure   -Was hypotensive off levophed- restarted at 3mg/hr    2. 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 in situ   -CT surgery consulted:  would need pneumonectomy for which she is a poor candidate --> medical therapy only  - Respiratory sputum culture repeated on 7/30 without pseudomonnas  -  3. Epistaxis- resolved. Right nare Rhinorocket Dcd.  -DC lovenox.  -Keep ASA and prasugrel going  -HnH qd  -Transfuse PRBCs if hb is below 7.0  -Transfer out of ICU in next 24hrs        4. Critical coronary artery disease single-vessel  Status post PCI to left circumflex by Dr Kwasi Martin on this admission  with stent  Now on antiplatelet therapy     5. Anemia, AOCD. Now has hemoptysis. FU iron studies TIBC ferritin % and reticulocyte count  No indication to transfusion yet       6. COPD, severe ([facility] - without acute exacerbation. Will provide Nebulizer treatments as needed and continue home medications. Patient will be monitored closely, and deep breathing and coughing will be encouraged while awake. 7. Hyperlipidemia - No current evidence of Rhabdomyolysis or other adverse effects. Continue statin therapy while in the hospital     8. Chronic pain syndrome -continue her home pain med regimen     9. Fibromyalgia - provide supportive care    DVT Prophylaxis: heparin  Diet: ADULT DIET; Dysphagia - Pureed; Moderately Thick (Honey)  ADULT ORAL NUTRITION SUPPLEMENT; Lunch, Dinner; Frozen Oral Supplement  Code Status: Limited    PT/OT Eval Status: evaluating    Dispo; Transfer out of ICU.  Can be Dcd in am.    Kateryna Callejas Larissa To MD

## 2022-08-09 NOTE — PROGRESS NOTES
Patient alert and oriented x3 at this time, family at bedside. Systolic BP 61I-27F, ZYN>97 at this time. RN asks patient/family if they are ok with restarting levophed if her BP drops overnight.  Patient and family verbalize understanding regarding levophed usage and state they would like levophed restarted if the pt's BP drops under goal.

## 2022-08-09 NOTE — PROGRESS NOTES
Infectious Disease Follow up Notes  Admit Date: 7/18/2022  Hospital Day: 23    Antibiotics :   IV Meropenem    CHIEF COMPLAINT:      Rt UL lung abscess  Severe Necrotizing PNA  Pseudomonas PNA  Hypoxic resp failure  Epistaxis       Subjective interval History :  78 y. o.woman with a past medical history of COPD, depression, chronic hypoxic respiratory failure now on   4 L of oxygen via nasal cannula, diabetes recent admission for COPD exacerbation and pneumonia diagnosed to have Pseudomonas based on sputum studies. She was on IV cefepime transition to oral levofloxacin on discharge. She was sent to [address] rehab facility and now admitted because of worsening shortness of breath increased sputum production. Sputum culture again on this admission positive for Pseudomonas with some resistance pattern, it is now become resistant to quinolones. CT chest on this admission with progressive pneumonia right upper lobe cavitary lesion possible lung abscess. Extensive consolidation  see images -  This is a new finding on the CT chest compared to her previous CT chest on 6/20/22. Given the necrotizing pneumonia with Pseudomonas infection and lung abscess formation we are consulted for recommendations. Interval History : Remains in ICU using high flow Oxygen mask no epistaxis but had sob breath and hypoxia seen by ICU team and she now opted for limited code - has scant sputum , on going sob+ cough+       Past Medical History:    Past Medical History:   Diagnosis Date    CAD (coronary artery disease) 07/19/2022    NSTEMI, PCI LCx    Chronic pain syndrome     Percocet.  Dr. Lagunas Number    Colorectal polyps     COPD (chronic obstructive pulmonary disease) ([facility] [address]     CTS (carpal tunnel syndrome)     BILATERAL    DDD (degenerative disc disease), lumbar     Depression     Family hx of colon cancer     Fibromyalgia     Hyperlipemia     IBS (irritable bowel syndrome)     Lumbar spondylosis     New onset type 2 diabetes mellitus ([facility] [address] 02/03/2020    On home O2     4L    Pneumonia 07/2022    P.aer    Urticaria, chronic        Past Surgical History:    Past Surgical History:   Procedure Laterality Date    BRONCHOSCOPY N/A 8/3/2022    BRONCHOSCOPY performed by Nasim Johnson DO at [facility] Bilateral     CATARACT EXTRACTION      CERVICAL POLYP REMOVAL  09/2004    CORONARY ANGIOPLASTY WITH STENT PLACEMENT  07/19/2022    LCx 99. PCI/stent. INTRACAPSULAR CATARACT EXTRACTION Left 06/23/2020    Phacoemulsification with intraocular lens implant performed by Edward Hines MD at [address] Right 07/14/2020    Phacoemulsification with intraocular lens implant performed by Edward Hines MD at [address]      patient denies        Current Medications:    Outpatient Medications Marked as Taking for the 7/18/22 encounter [facility] Encounter)   Medication Sig Dispense Refill    aspirin 81 MG EC tablet Take 1 tablet by mouth in the morning. 30 tablet 3    prasugrel (EFFIENT) 10 MG TABS Take 1 tablet by mouth in the morning. 30 tablet 1       Allergies:  Patient has no known allergies.     Immunizations :   Immunization History   Administered Date(s) Administered    COVID-19, MODERNA BLUE border, Primary or Immunocompromised, (age 12y+), IM, 100 mcg/0.5mL 03/08/2021, 04/05/2021    COVID-19, MODERNA Booster BLUE border, (age 18y+), IM, 50mcg/0.25mL 12/01/2021    Influenza Vaccine, unspecified formulation 01/10/2017    Influenza, High Dose (Fluzone 65 yrs and older) 11/04/2013, 01/21/2016, 09/01/2017, 10/30/2018, 09/18/2020    Influenza, Quadv, adjuvanted, 65 yrs +, IM, PF (Fluad) 09/27/2021    Influenza, Triv, inactivated, subunit, adjuvanted, IM (Fluad 65 yrs and older) 09/13/2019    Pneumococcal Conjugate 13-valent Soto Ko) 01/19/2015    Pneumococcal Conjugate Vaccine 01/10/2017    Pneumococcal Polysaccharide ([id number]) 10/12/2012    Tdap (Boostrix, Adacel) 07/17/2007    Zoster Recombinant (Shingrix) 11/21/2019       Social History:     Social History     Tobacco Use    Smoking status: Every Day     Packs/day: 1.00     Years: 55.00     Pack years: 55.00     Types: Cigarettes     Start date: 1/1/1962    Smokeless tobacco: Never    Tobacco comments:     almost ready to quit   Vaping Use    Vaping Use: Never used   Substance Use Topics    Alcohol use: No     Alcohol/week: 0.0 standard drinks    Drug use: No     Social History     Tobacco Use   Smoking Status Every Day    Packs/day: 1.00    Years: 55.00    Pack years: 55.00    Types: Cigarettes    Start date: 1/1/1962   Smokeless Tobacco Never   Tobacco Comments    almost ready to quit      Family History   Problem Relation Age of Onset    Cancer Father         COLON     Alzheimer's Disease Mother     Heart Disease Brother     Heart Failure Brother     Diabetes Daughter     Diabetes Daughter     Diabetes Daughter           REVIEW OF SYSTEMS:      Constitutional:  negative for fevers, chills, night sweats  Eyes:  negative for blurred vision, eye discharge, visual disturbance   HEENT:  negative for hearing loss, ear drainage,nasal congestion  Respiratory:  r cough,+  shortness of breath++ or hemoptysis   Cardiovascular:  negative for chest pain, palpitations, syncope  Gastrointestinal:  negative for nausea, vomiting, diarrhea, constipation, abdominal pain  Genitourinary:  negative for frequency, dysuria, urinary incontinence, hematuria  Hematologic/Lymphatic:  negative for easy bruising, bleeding and lymphadenopathy  Allergic/Immunologic:  negative for recurrent infections, angioedema, anaphylaxis   Endocrine:  negative for weight changes, polyuria, polydipsia and polyphagia  Musculoskeletal:  negative for joint  pain, swelling, decreased range of motion  Neurological: negative for headaches, slurred speech, unilateral weakness  Psychiatric: negative for hallucinations,confusion,agitation.              PHYSICAL EXAM:      Vitals:    /84   Pulse (!) 107   Temp 97.5 °F (36.4 °C) (Axillary)   Resp 22   Ht 5' 4\" (1.626 m)   Wt 131 lb 6.3 oz (59.6 kg)   SpO2 94%   BMI 22.55 kg/m²     General Appearance: awake and in some  acute distress, ++  pallor on e Venturi mask+    Skin: warm and dry, no rash or erythema  Head: normocephalic and atraumatic  Eyes: pupils equal, round, and reactive to light, conjunctivae normal  ENT: tympanic membrane, external ear and ear canal normal bilaterally, nose without deformity, nasal mucosa and turbinates normal without polyps  Neck: supple and non-tender without mass, no thyromegaly  no cervical lymphadenopathy  Pulmonary/Chest:  Rt UL coarse crepts+ +  wheezes, rales or rhonchi, normal air movement, in some respiratory distress  Cardiovascular: normal rate, regular rhythm, normal S1 and S2, no murmurs, rubs, clicks, or gallops, no carotid bruits  Abdomen: soft, non-tender, non-distended, normal bowel sounds, no masses or organomegaly  Extremities: no cyanosis, clubbing or edema  Musculoskeletal: normal range of motion, no joint swelling, deformity or tenderness  Integumentary: No rashes, no abnormal skin lesions, no petechiae  Neurologic: reflexes normal and symmetric, no cranial nerve deficit           Lines: PICC     Data Review:    CBC:   Lab Results   Component Value Date    WBC 8.7 08/09/2022    HGB 8.0 (L) 08/09/2022    HCT 24.2 (L) 08/09/2022    MCV 90.3 08/09/2022     (H) 08/09/2022     RENAL:   Lab Results   Component Value Date    CREATININE <0.5 (L) 08/09/2022    BUN 14 08/09/2022     08/09/2022    K 4.5 08/09/2022    CL 99 08/09/2022    CO2 36 (H) 08/09/2022     SED RATE: No results found for: SEDRATE  CK: No results found for: CKTOTAL  CRP: No results found for: CRP  Hepatic Function Panel:   Lab Results   Component Value Date/Time    ALKPHOS 88 07/18/2022 05:20 PM    ALT 65 07/18/2022 05:20 PM    AST 61 07/18/2022 05:20 PM    PROT 5.6 07/18/2022 05:20 PM    PROT 6.9 10/12/2012 11:00 AM    BILITOT 0.5 07/18/2022 05:20 PM    LABALBU 2.4 07/18/2022 05:20 PM     UA:  Lab Results   Component Value Date/Time    COLORU Yellow 07/22/2022 06:31 AM    CLARITYU Clear 07/22/2022 06:31 AM    GLUCOSEU Negative 07/22/2022 06:31 AM    BILIRUBINUR Negative 07/22/2022 06:31 AM    BILIRUBINUR neg 10/17/2019 11:50 AM    KETUA TRACE 07/22/2022 06:31 AM    SPECGRAV 1.049 07/22/2022 06:31 AM    BLOODU Negative 07/22/2022 06:31 AM    PHUR 6.5 07/22/2022 06:31 AM    PROTEINU 30 07/22/2022 06:31 AM    UROBILINOGEN 0.2 07/22/2022 06:31 AM    NITRU Negative 07/22/2022 06:31 AM    LEUKOCYTESUR Negative 07/22/2022 06:31 AM    LABMICR YES 07/22/2022 06:31 AM    URINETYPE NotGiven 07/22/2022 06:31 AM      Urine Microscopic:   Lab Results   Component Value Date/Time    LABCAST 10-20 Hyaline 01/10/2017 06:19 PM    BACTERIA None Seen 07/22/2022 06:31 AM    COMU see below 07/03/2022 03:11 PM    HYALCAST 2 07/22/2022 06:31 AM    WBCUA 2 07/22/2022 06:31 AM    RBCUA 5 07/22/2022 06:31 AM    EPIU 1 07/22/2022 06:31 AM     Urine Reflex to Culture:   Lab Results   Component Value Date/Time    URRFLXCULT Not Indicated 07/03/2022 03:11 PM         MICRO: cultures reviewed and updated by me   Blood Culture:          Culture, Respiratory [[id number]] (Abnormal)  Collected: 07/22/22 1200   Order Status: Completed Specimen: Sputum Expectorated Updated: 07/24/22 0801    CULTURE, RESPIRATORY Rare growth normal respiratory fabiana with Abnormal     Gram Stain Result No Epithelial Cells seen   3+ WBC's (Polymorphonuclear)   No organisms seen     Organism Pseudomonas aeruginosa Abnormal     CULTURE, RESPIRATORY Light growth   Narrative:     ORDER#: [id number]                          ORDERED BY: CIRA KEBEDE   SOURCE: Sputum Expectorated                COLLECTED:  07/22/22 12:00 ANTIBIOTICS AT GURWINDER.:                      RECEIVED :  07/22/22 12:22   Respiratory Culture [[id number]] (Abnormal)  Collected: 07/19/22 2030   Order Status: Completed Specimen: Sputum Expectorated Updated: 07/23/22 0748    CULTURE, RESPIRATORY Light growth normal respiratory fabiana with Abnormal     Gram Stain Result 2+ Gram positive cocci   2+ WBC's (Polymorphonuclear)   1+ Epithelial Cells     Organism Pseudomonas aeruginosa Abnormal     CULTURE, RESPIRATORY Light growth   Narrative:     ORDER#: [id number]                          ORDERED BY: FARZANA GAN   SOURCE: Sputum Expectorated                COLLECTED:  07/19/22 20:30   ANTIBIOTICS AT GURWINDER.:                      RECEIVED :  07/19/22 21:19   Culture, Blood 1 [[id number]] Collected: 07/18/22 1841   Order Status: Completed Specimen: Blood Updated: 07/22/22 2015    Blood Culture, Routine No Growth after 4 days of incubation. Narrative:     ORDER#: [id number]                          ORDERED BY: Vikash Nowak   SOURCE: Blood                              COLLECTED:  07/18/22 18:41   ANTIBIOTICS AT GURWINDER.:                      RECEIVED :  07/18/22 18:56   If child <=2 yrs old please draw pediatric bottle. ~Blood Culture 1   Culture, Blood 2 [[id number]] Collected: 07/18/22 1850   Order Status: Completed Specimen: Blood Updated: 07/22/22 2015    Culture, Blood 2 No Growth after 4 days of incubation. Narrative:     ORDER#: [id number]                          ORDERED BY: Vikash Nowak   SOURCE: Blood                              COLLECTED:  07/18/22 18:50   ANTIBIOTICS AT GURWINDER.:                      RECEIVED :  07/18/22 18:56   If child <=2 yrs old please draw pediatric bottle. ~Blood Culture #2   Strep Pneumoniae Antigen [[id number]] Collected: 07/20/22 1020   Order Status: Completed Specimen: Urine, clean catch Updated: 07/21/22 0841    STREP PNEUMONIAE ANTIGEN, URINE --    Presumptive Negative   Presumptive negative suggests no current or recent pneumococcal infection. Infection due to Strep pneumoniae   cannot be ruled out since the antigen present in the sample   may be below the detection limit of the test.   Normal Range:Presumptive Negative    Narrative:     ORDER#: [id number]                          ORDERED BY: FARZANA GAN   SOURCE: Urine Clean Catch                  COLLECTED:  07/20/22 10:20   ANTIBIOTICS AT GURWINDER.:                      RECEIVED :  07/20/22 10:27   Legionella antigen, urine [[id number]] Collected: 07/20/22 1020   Order Status: Completed Specimen: Urine, catheter Updated: 07/21/22 0840    L. pneumophila Serogp 1 Ur Ag --    Presumptive Negative   No Legionella pneumophila serogroup 1 antigens detected. A negative result does not exclude infection with   Legionella pneumophila serogroup 1 nor does it rule out   other microbial-caused respiratory infections or   disease caused by other serogroups of   Legionella pneumophila. Normal Range: Presumptive Negative    Narrative:     ORDER#: [id number]                          ORDERED BY: FARZANA GAN   SOURCE: Urine Catheter                     COLLECTED:  07/20/22 10:20   ANTIBIOTICS AT GURWINDER.:                      RECEIVED :  07/20/22 10:28   Sputum gram stain [[id number]] Collected: 07/19/22 2030   Order Status: Canceled Specimen: Sputum Expectorated    Rapid influenza A/B antigens [[id number]] Collected: 07/19/22 0210   Order Status: Completed Specimen: Nares Updated: 07/19/22 0245    Rapid Influenza A Ag Negative    Rapid Influenza B Ag Negative   COVID-19, Rapid [[id number]] Collected: 07/18/22 1825   Order Status: Completed Specimen: Nasopharyngeal Swab Updated: 07/18/22 1854    SARS-CoV-2, NAAT Not Detected    Comment: Rapid NAAT:   Negative results should be treated as presumptive and,   if inconsistent with clinical signs and symptoms or necessary for   patient management, should be tested with an alternative molecular   assay.  Negative results do not preclude SARS-CoV-2 infection and   should not be used as the sole basis for patient management decisions. This test has been authorized by the FDA under an Emergency Use   Authorization (EUA) for use by authorized laboratories. Fact sheet for Healthcare Providers:   Maykel.[email or web address]   Fact sheet for Patients: Maykel.[email or web address]     METHODOLOGY: Isothermal Nucleic Acid Amplification         CULTURE, RESPIRATORY Rare growth normal respiratory fabiana with Abnormal     Gram Stain Result No Epithelial Cells seen   3+ WBC's (Polymorphonuclear)   No organisms seen    Organism Pseudomonas aeruginosa Abnormal     CULTURE, RESPIRATORY Light growth    Resulting Agency [address] [facility]          Susceptibility      Pseudomonas aeruginosa (1)    Antibiotic Interpretation Microscan  Method Status    cefepime Sensitive 8 mcg/mL BACTERIAL SUSCEPTIBILITY PANEL BY TIERRA     ciprofloxacin Resistant >2 mcg/mL BACTERIAL SUSCEPTIBILITY PANEL BY TIERRA     gentamicin Sensitive <=4 mcg/mL BACTERIAL SUSCEPTIBILITY PANEL BY TIERRA     meropenem Sensitive <=1 mcg/mL BACTERIAL SUSCEPTIBILITY PANEL BY TIERRA     piperacillin-tazobactam Sensitive <=16 mcg/mL BACTERIAL SUSCEPTIBILITY PANEL BY TIERRA     tobramycin Sensitive <=4 mcg/mL BACTERIAL SUSCEPTIBILITY PANEL BY TIERRA          Narrative  Performed by: [facility]  ORDER#: [id number]                          ORDERED BY: CIRA KEBEDE   SOURCE: Sputum Expectorated                COLLECTED:  07/22/22 12:00   ANTIBIOTICS AT GURWINDER.:                      RECEIVED :  07/22/22 12:22           Lab Results   Component Value Date/Time    BC No Growth after 4 days of incubation. 07/18/2022 06:41 PM    BLOODCULT2 No Growth after 4 days of incubation.  07/18/2022 06:50 PM       Respiratory Culture:  Lab Results   Component Value Date/Time    CULTRESP Normal respiratory fabiana 07/30/2022 04:00 PM    LABGRAM  07/30/2022 04:00 PM     3+ WBC's (Mononuclear)  1+ Epithelial Cells  1+ Gram positive cocci       AFB:No results found for: AFBSMEAR  Viral Culture:  Lab Results   Component Value Date/Time    COVID19 Not Detected 08/01/2022 01:55 PM     Urine Culture: No results for input(s): Rosalinda Joseph in the last 72 hours. IMAGING:    XR CHEST PORTABLE   Final Result   Left-sided PICC line tip is in the superior vena cava, the tip is turned   upward superiorly, a change from priors. Moderate right pleural effusion and right lung consolidations similar to   prior study. XR CHEST PORTABLE   Final Result   Increasing opacity throughout portions of the right lung, which can reflect   combination of airspace disease, atelectasis, and pleural effusion. XR CHEST PORTABLE   Final Result   Endotracheal tube with the tip approximately 1 cm above the song. Consider   retraction by approximately 3.5 cm. Enteric tube with the tip and the side   hole below the diaphragm overlying the left upper abdomen, likely within the   fundus of the stomach. Redemonstration of multifocal pneumonia affecting the right lung. Small   right pleural effusion. XR CHEST PORTABLE   Final Result   Unchanged multifocal right-sided pneumonia. XR CHEST PORTABLE   Final Result   Stable multifocal airspace disease in the right lung with probable upper lobe   abscess. XR CHEST PORTABLE   Final Result   Stable multifocal airspace disease in the right lung, including probable   pulmonary abscess in the right upper lobe. CT CHEST WO CONTRAST   Final Result   1. Mixed consolidative and ground-glass opacities as well as interstitial   thickening throughout the majority of the right lung compatible with   pneumonia. 2. There is a cavity in the right upper lobe demonstrating an air-fluid level   measuring up to 9.7 cm concerning for abscess formation. 3. Trace right pleural effusion. 4. Prominent and enlarged mediastinal lymph nodes, likely reactive.    5. Stable 6 mm left lower lobe pulmonary nodule. RECOMMENDATIONS:   Fleischner Society guidelines for follow-up and management of incidentally   detected pulmonary nodules:      Single Solid Nodule:      Nodule size less than 6 mm   In a low-risk patient, no routine follow-up. In a high-risk patient, optional CT at 12 months. Nodule size equals 6-8 mm   In a low-risk patient, CT at 6-12 months, then consider CT at 18-24 months. In a high-risk patient, CT at 6-12 months, then CT at 18-24 months. Nodule size greater than 8 mm         In a low-risk patient, consider CT at 3 months, PET/CT, or tissue sampling. In a high-risk patient, consider CT at 3 months, PET/CT, or tissue sampling. Multiple Solid Nodules:      Nodule size less than 6 mm   In a low-risk patient, no routine follow-up. In a high-risk patient, optional CT at 12 months. Nodule size equals 6-8 mm   In a low-risk patient, CT at 3-6 months, then consider CT at 18-24 months. In a high-risk patient, CT at 3-6 months, then CT at 18-24 months. Nodule size greater than 8 mm   In a low-risk patient, CT at 3-6 months, then consider CT at 18-24 months. In a high-risk patient, CT at 3-6 months, then CT at 18-24 months. - Low risk patients include individuals with minimal or absent history of   smoking and other known risk factors. - High risk patients include individuals with a history or smoking or known   risk factors. Radiology 2017 http://pubs. rsna.org/doi/full/10.1148/radiol. 3767412442         XR CHEST PORTABLE   Final Result   1. Increased pneumonia throughout the right lung remaining most prominent in   the right upper lobe. 2. Emphysema better demonstrated on CT. 3. Possible trace right pleural effusion.          XR CHEST PORTABLE   Final Result   Improved aeration of the right chest with persistent consolidation in the mid   to upper right chest.         CT CHEST WO CONTRAST    (Results Pending)   Fluoroscopy modified barium swallow with video    (Results Pending)     XR CHEST PORTABLE   Final Result   1. Increased pneumonia throughout the right lung remaining most prominent in   the right upper lobe. 2. Emphysema better demonstrated on CT. 3. Possible trace right pleural effusion. XR CHEST PORTABLE   Final Result   Improved aeration of the right chest with persistent consolidation in the mid   to upper right chest.                All pertinent images and reports for the current Hospitalization were reviewed by me.        Scheduled Meds:   ipratropium-albuterol  1 ampule Inhalation 4x daily    oxymetazoline  2 spray Each Nostril BID    aspirin  325 mg Oral Once    aspirin  81 mg Oral Daily    sodium chloride (Inhalant)  15 mL Nebulization Q4H While awake    fluconazole  100 mg Oral Daily    [Held by provider] enoxaparin  40 mg SubCUTAneous Daily    meropenem  1,000 mg IntraVENous Q8H    nystatin  5 mL Oral 4x Daily    [Held by provider] prasugrel  10 mg Oral Daily    sodium chloride flush  5-40 mL IntraVENous 2 times per day    atorvastatin  40 mg Oral Nightly    tiZANidine  4 mg Oral Nightly    gabapentin  600 mg Oral BID    DULoxetine  60 mg Oral BID    mometasone-formoterol  2 puff Inhalation BID       Continuous Infusions:   norepinephrine 1 mcg/min (08/09/22 0704)    sodium chloride 10 mL/hr at 08/07/22 0850    dextrose         PRN Meds:  potassium chloride, sodium chloride, sodium chloride flush, sodium chloride, acetaminophen, perflutren lipid microspheres, traZODone, ipratropium-albuterol, cetirizine, albuterol sulfate HFA, glucose, dextrose bolus **OR** dextrose bolus, glucagon (rDNA), dextrose, ondansetron, polyethylene glycol      Assessment:     Patient Active Problem List   Diagnosis    Osteopenia-last dexa 2009 repeat 2015 (-2.4 hip)--advised tx    Colon polyp, hyperplastic,-(done 3/11-repeat 3-5 yrs--dr ulloa)    Family history of colon cancer    CTS (carpal tunnel syndrome)BILATERAL-s/p surgical repair--resolved     Postmenopausal status-last mammogram 2/15 wnl last pap 12/13--wnl    Nondependent alcohol abuse, in remission    Urticaria, chronic    Smoking    Chronic back pain-(djd) saw dr Falguni Zarco afford surgery--seeing dr Missy Cheng for pain meds    Fibromyalgia    Hypercholesteremia    Lumbar spondylosis    Vitamin D deficiency--severe--started 50,000 iu 2x/ wk 11/13    Insomnia--on elevil w help    Constipation--on daily miralax    Depression    Chronic pain syndrome    Muscle cramping    Acute on chronic respiratory failure with hypercapnia (HCC)    ROLY (acute kidney injury) ([address]    Severe sepsis (HCC)    Gastroesophageal reflux disease    COPD, severe ([address]    Chronic hypoxemic respiratory failure ([address]    Degeneration of lumbar or lumbosacral intervertebral disc    Trochanteric bursitis of right hip    COPD exacerbation ([address]    Diabetes ([address]    Controlled type 2 diabetes mellitus without complication, without long-term current use of insulin ([address]    Age-related osteoporosis without current pathological fracture- started alendronate 9/2021    Pulmonary nodule seen on imaging study    Pneumonia of right lung due to infectious organism    General weakness    Elevated lactic acid level    Community acquired pneumonia of right lung    Chronic obstructive pulmonary disease ([address]    Acute respiratory failure with hypoxia (HCC)    Acute on chronic respiratory failure with hypoxia and hypercapnia (HCC)    Abnormal EKG    NSTEMI (non-ST elevated myocardial infarction) ([address]    Necrotizing pneumonia ([address]    Abscess of upper lobe of right lung with pneumonia ([address]    Pseudomonas respiratory infection    Abnormal CT of the chest    Peripherally inserted central catheter (PICC) in place    Massive hemoptysis    Epistaxis    Acute respiratory insufficiency    Acute blood loss anemia    Hypotension due to drugs     Severe necrotizing Pseudomonas pneumonia  Right upper lobe cavitary lesion  Right right lung evolving abscess  Right lung dense consolidation of  Pseudomonas pneumonia developing some resistance  COPD exacerbation  Hypoxic respiratory failure  Coronary artery disease  Status post cardiac cath  Chronic smoking  Abnormal CT chest  BNP elevation  COVID-19 negative  Epistaxis severe vs Hemoptysis now intubated sedated on the ventilator for airway protection 8/3/22   Now extubated to Venturi mask  Rhino Rocket removed by ENT      Unfortunately she developed progressive changes with dense consolidation and lung abscess secondary to Pseudomonas pneumonia. Pseudomonas appears to have developed resistance to quinolones while on therapy this is concerning.   CT chest on this admission grossly abnormal and definitely there is progressive changes given the severity of the -pneumonia will need IV antibiotics     OPAT d/w pt she needs to QUIT smoking d/w pt and her family        Given the lung findings will have to continue antibiotic therapy anticipate least 2 more weeks of duration of treatment    repeat sputum testing to see if Pseudomonas is clearing out on the current therapy-sputum repeat Normal fabiana noted      transferred to ICU secondary to massive epistaxis versus was  intubated on the ventilator status post bronchoscopy      Extubated on 8/4/22 and on Venturi mask has Rhinorocket in place ENT notes reviewed      Still has mild epistaxis noted better controlled ENT is following closely and the current antibiotics will provide sinus coverage     May repeat CT chest before d/c is she stays longer as in patient     Rhino Rocket removed by ENT today no further epistaxis noted tolerating antibiotic therapy okay    She is still sob and CXR Rt side still worse and given her age and severe infection risk for progression -       Labs, Microbiology, Radiology and all the pertinent results from current hospitalization and  care every where were reviewed  by me as a part of the evaluation   Plan:   Cont  IV Meropenem 1 gm q 8 HR X stop date  x  8/30  Will change to Q 12 hr DOSING AT ad/c as SNF unable to do Q 8hr dosing for dc planning   3 . Picc line in place   4. OPAT d/w pt  5 QUIT smoking  6. Will repeat CT chest as in patient if she stays longer in house  7 Cont supportive care  8. CT images reviewed see above    9. Risk for progression  10 on  Venturi mask  11. Repeat sputum cleared   12. Robby DONE   13, ENT following for massive Epistaxis - controlled with Rhino rocket - Rhino Rocket removed   14. CXR not improving much is of concern risk for intubation -         Discussed with patient/Family and Nursing   Risk of Complications/Morbidity: High      Illness(es)/ Infection present that pose threat to bodily function. There is potential for severe exacerbation of infection/side effects of treatment. Therapy requires intensive monitoring for antimicrobial agent toxicity. Thanks for allowing me to participate in your patient's care and please call me with any questions or concerns.     Joselin Tena MD  Infectious Disease  [facility]) Physician  Phone: [phone]   Fax : [phone]

## 2022-08-10 LAB
ANION GAP SERPL CALCULATED.3IONS-SCNC: 6 MMOL/L (ref 3–16)
BASOPHILS ABSOLUTE: 0.1 K/UL (ref 0–0.2)
BASOPHILS RELATIVE PERCENT: 0.7 %
BUN BLDV-MCNC: 10 MG/DL (ref 7–20)
CALCIUM SERPL-MCNC: 8.8 MG/DL (ref 8.3–10.6)
CHLORIDE BLD-SCNC: 97 MMOL/L (ref 99–110)
CO2: 35 MMOL/L (ref 21–32)
CREAT SERPL-MCNC: <0.5 MG/DL (ref 0.6–1.2)
EOSINOPHILS ABSOLUTE: 0.3 K/UL (ref 0–0.6)
EOSINOPHILS RELATIVE PERCENT: 3.8 %
GFR AFRICAN AMERICAN: >60
GFR NON-AFRICAN AMERICAN: >60
GLUCOSE BLD-MCNC: 106 MG/DL (ref 70–99)
HCT VFR BLD CALC: 22 % (ref 36–48)
HEMOGLOBIN: 7.5 G/DL (ref 12–16)
LYMPHOCYTES ABSOLUTE: 1.9 K/UL (ref 1–5.1)
LYMPHOCYTES RELATIVE PERCENT: 21.1 %
MAGNESIUM: 1.8 MG/DL (ref 1.8–2.4)
MCH RBC QN AUTO: 30.4 PG (ref 26–34)
MCHC RBC AUTO-ENTMCNC: 34.2 G/DL (ref 31–36)
MCV RBC AUTO: 88.9 FL (ref 80–100)
MONOCYTES ABSOLUTE: 1.1 K/UL (ref 0–1.3)
MONOCYTES RELATIVE PERCENT: 12 %
NEUTROPHILS ABSOLUTE: 5.6 K/UL (ref 1.7–7.7)
NEUTROPHILS RELATIVE PERCENT: 62.4 %
PDW BLD-RTO: 14.9 % (ref 12.4–15.4)
PHOSPHORUS: 4.2 MG/DL (ref 2.5–4.9)
PLATELET # BLD: 487 K/UL (ref 135–450)
PMV BLD AUTO: 7 FL (ref 5–10.5)
POTASSIUM REFLEX MAGNESIUM: 4.8 MMOL/L (ref 3.5–5.1)
RBC # BLD: 2.48 M/UL (ref 4–5.2)
SODIUM BLD-SCNC: 138 MMOL/L (ref 136–145)
WBC # BLD: 9 K/UL (ref 4–11)

## 2022-08-10 PROCEDURE — 6360000002 HC RX W HCPCS: Performed by: INTERNAL MEDICINE

## 2022-08-10 PROCEDURE — 94640 AIRWAY INHALATION TREATMENT: CPT

## 2022-08-10 PROCEDURE — 94669 MECHANICAL CHEST WALL OSCILL: CPT

## 2022-08-10 PROCEDURE — 84100 ASSAY OF PHOSPHORUS: CPT

## 2022-08-10 PROCEDURE — 92526 ORAL FUNCTION THERAPY: CPT

## 2022-08-10 PROCEDURE — 2580000003 HC RX 258: Performed by: INTERNAL MEDICINE

## 2022-08-10 PROCEDURE — 6370000000 HC RX 637 (ALT 250 FOR IP): Performed by: INTERNAL MEDICINE

## 2022-08-10 PROCEDURE — 6370000000 HC RX 637 (ALT 250 FOR IP): Performed by: STUDENT IN AN ORGANIZED HEALTH CARE EDUCATION/TRAINING PROGRAM

## 2022-08-10 PROCEDURE — 2700000000 HC OXYGEN THERAPY PER DAY

## 2022-08-10 PROCEDURE — 85025 COMPLETE CBC W/AUTO DIFF WBC: CPT

## 2022-08-10 PROCEDURE — 97530 THERAPEUTIC ACTIVITIES: CPT

## 2022-08-10 PROCEDURE — 99233 SBSQ HOSP IP/OBS HIGH 50: CPT | Performed by: INTERNAL MEDICINE

## 2022-08-10 PROCEDURE — 99291 CRITICAL CARE FIRST HOUR: CPT | Performed by: INTERNAL MEDICINE

## 2022-08-10 PROCEDURE — 6360000002 HC RX W HCPCS: Performed by: NURSE PRACTITIONER

## 2022-08-10 PROCEDURE — APPNB15 APP NON BILLABLE TIME 0-15 MINS: Performed by: NURSE PRACTITIONER

## 2022-08-10 PROCEDURE — 80048 BASIC METABOLIC PNL TOTAL CA: CPT

## 2022-08-10 PROCEDURE — 94761 N-INVAS EAR/PLS OXIMETRY MLT: CPT

## 2022-08-10 PROCEDURE — 6370000000 HC RX 637 (ALT 250 FOR IP): Performed by: NURSE PRACTITIONER

## 2022-08-10 PROCEDURE — 83735 ASSAY OF MAGNESIUM: CPT

## 2022-08-10 PROCEDURE — 2000000000 HC ICU R&B

## 2022-08-10 PROCEDURE — 2500000003 HC RX 250 WO HCPCS: Performed by: NURSE PRACTITIONER

## 2022-08-10 RX ORDER — METHYLPREDNISOLONE SODIUM SUCCINATE 40 MG/ML
40 INJECTION, POWDER, LYOPHILIZED, FOR SOLUTION INTRAMUSCULAR; INTRAVENOUS DAILY
Status: COMPLETED | OUTPATIENT
Start: 2022-08-10 | End: 2022-08-14

## 2022-08-10 RX ORDER — CLOPIDOGREL BISULFATE 75 MG/1
75 TABLET ORAL DAILY
Status: DISCONTINUED | OUTPATIENT
Start: 2022-08-10 | End: 2022-08-16 | Stop reason: HOSPADM

## 2022-08-10 RX ORDER — MAGNESIUM SULFATE IN WATER 40 MG/ML
2000 INJECTION, SOLUTION INTRAVENOUS ONCE
Status: COMPLETED | OUTPATIENT
Start: 2022-08-10 | End: 2022-08-10

## 2022-08-10 RX ORDER — TOBRAMYCIN INHALATION SOLUTION 300 MG/5ML
300 INHALANT RESPIRATORY (INHALATION) 2 TIMES DAILY
Status: DISCONTINUED | OUTPATIENT
Start: 2022-08-10 | End: 2022-08-16 | Stop reason: HOSPADM

## 2022-08-10 RX ORDER — PANTOPRAZOLE SODIUM 40 MG/1
40 TABLET, DELAYED RELEASE ORAL
Status: DISCONTINUED | OUTPATIENT
Start: 2022-08-10 | End: 2022-08-16 | Stop reason: HOSPADM

## 2022-08-10 RX ORDER — CALCIUM CARBONATE 200(500)MG
500 TABLET,CHEWABLE ORAL ONCE
Status: COMPLETED | OUTPATIENT
Start: 2022-08-10 | End: 2022-08-10

## 2022-08-10 RX ADMIN — ALUMINUM HYDROXIDE, MAGNESIUM HYDROXIDE, AND SIMETHICONE: 200; 200; 20 SUSPENSION ORAL at 23:51

## 2022-08-10 RX ADMIN — OXYMETAZOLINE HCL 2 SPRAY: 0.05 SPRAY NASAL at 08:14

## 2022-08-10 RX ADMIN — MOMETASONE FUROATE AND FORMOTEROL FUMARATE DIHYDRATE 2 PUFF: 200; 5 AEROSOL RESPIRATORY (INHALATION) at 07:18

## 2022-08-10 RX ADMIN — GABAPENTIN 600 MG: 300 CAPSULE ORAL at 08:14

## 2022-08-10 RX ADMIN — PANTOPRAZOLE SODIUM 40 MG: 40 TABLET, DELAYED RELEASE ORAL at 20:50

## 2022-08-10 RX ADMIN — SODIUM CHLORIDE SOLN NEBU 3% 15 ML: 3 NEBU SOLN at 11:07

## 2022-08-10 RX ADMIN — NYSTATIN 500000 UNITS: 100000 SUSPENSION ORAL at 08:14

## 2022-08-10 RX ADMIN — GABAPENTIN 600 MG: 300 CAPSULE ORAL at 20:50

## 2022-08-10 RX ADMIN — TIZANIDINE 4 MG: 4 TABLET ORAL at 20:50

## 2022-08-10 RX ADMIN — SODIUM CHLORIDE, PRESERVATIVE FREE 10 ML: 5 INJECTION INTRAVENOUS at 20:57

## 2022-08-10 RX ADMIN — NYSTATIN 500000 UNITS: 100000 SUSPENSION ORAL at 13:14

## 2022-08-10 RX ADMIN — SODIUM CHLORIDE SOLN NEBU 3% 15 ML: 3 NEBU SOLN at 19:50

## 2022-08-10 RX ADMIN — METHYLPREDNISOLONE SODIUM SUCCINATE 40 MG: 40 INJECTION, POWDER, FOR SOLUTION INTRAMUSCULAR; INTRAVENOUS at 13:17

## 2022-08-10 RX ADMIN — NYSTATIN 500000 UNITS: 100000 SUSPENSION ORAL at 16:29

## 2022-08-10 RX ADMIN — MAGNESIUM SULFATE HEPTAHYDRATE 2000 MG: 40 INJECTION, SOLUTION INTRAVENOUS at 11:45

## 2022-08-10 RX ADMIN — IPRATROPIUM BROMIDE AND ALBUTEROL SULFATE 1 AMPULE: 2.5; .5 SOLUTION RESPIRATORY (INHALATION) at 15:28

## 2022-08-10 RX ADMIN — CLOPIDOGREL BISULFATE 75 MG: 75 TABLET ORAL at 13:14

## 2022-08-10 RX ADMIN — ANTACID TABLETS 500 MG: 500 TABLET, CHEWABLE ORAL at 20:50

## 2022-08-10 RX ADMIN — SODIUM CHLORIDE SOLN NEBU 3% 15 ML: 3 NEBU SOLN at 15:28

## 2022-08-10 RX ADMIN — MOMETASONE FUROATE AND FORMOTEROL FUMARATE DIHYDRATE 2 PUFF: 200; 5 AEROSOL RESPIRATORY (INHALATION) at 19:50

## 2022-08-10 RX ADMIN — IPRATROPIUM BROMIDE AND ALBUTEROL SULFATE 1 AMPULE: 2.5; .5 SOLUTION RESPIRATORY (INHALATION) at 19:50

## 2022-08-10 RX ADMIN — MEROPENEM 1000 MG: 1 INJECTION, POWDER, FOR SOLUTION INTRAVENOUS at 13:21

## 2022-08-10 RX ADMIN — IPRATROPIUM BROMIDE AND ALBUTEROL SULFATE 1 AMPULE: 2.5; .5 SOLUTION RESPIRATORY (INHALATION) at 07:17

## 2022-08-10 RX ADMIN — MEROPENEM 1000 MG: 1 INJECTION, POWDER, FOR SOLUTION INTRAVENOUS at 06:34

## 2022-08-10 RX ADMIN — NYSTATIN 500000 UNITS: 100000 SUSPENSION ORAL at 20:50

## 2022-08-10 RX ADMIN — TOBRAMYCIN 300 MG: 300 SOLUTION RESPIRATORY (INHALATION) at 19:57

## 2022-08-10 RX ADMIN — DULOXETINE HYDROCHLORIDE 60 MG: 60 CAPSULE, DELAYED RELEASE ORAL at 20:50

## 2022-08-10 RX ADMIN — ATORVASTATIN CALCIUM 40 MG: 40 TABLET, FILM COATED ORAL at 20:50

## 2022-08-10 RX ADMIN — SODIUM CHLORIDE SOLN NEBU 3% 15 ML: 3 NEBU SOLN at 07:17

## 2022-08-10 RX ADMIN — DULOXETINE HYDROCHLORIDE 60 MG: 60 CAPSULE, DELAYED RELEASE ORAL at 08:14

## 2022-08-10 RX ADMIN — IPRATROPIUM BROMIDE AND ALBUTEROL SULFATE 1 AMPULE: 2.5; .5 SOLUTION RESPIRATORY (INHALATION) at 11:07

## 2022-08-10 RX ADMIN — MEROPENEM 1000 MG: 1 INJECTION, POWDER, FOR SOLUTION INTRAVENOUS at 20:49

## 2022-08-10 RX ADMIN — ONDANSETRON 4 MG: 2 INJECTION INTRAMUSCULAR; INTRAVENOUS at 19:01

## 2022-08-10 RX ADMIN — TOBRAMYCIN 300 MG: 300 SOLUTION RESPIRATORY (INHALATION) at 11:09

## 2022-08-10 RX ADMIN — FLUCONAZOLE 100 MG: 100 TABLET ORAL at 08:14

## 2022-08-10 RX ADMIN — ASPIRIN 81 MG: 81 TABLET, CHEWABLE ORAL at 08:14

## 2022-08-10 RX ADMIN — Medication 2 MCG/MIN: at 00:32

## 2022-08-10 RX ADMIN — OXYMETAZOLINE HCL 2 SPRAY: 0.05 SPRAY NASAL at 20:57

## 2022-08-10 RX ADMIN — SODIUM CHLORIDE, PRESERVATIVE FREE 30 ML: 5 INJECTION INTRAVENOUS at 08:14

## 2022-08-10 ASSESSMENT — PAIN DESCRIPTION - FREQUENCY: FREQUENCY: CONTINUOUS

## 2022-08-10 ASSESSMENT — PAIN DESCRIPTION - DESCRIPTORS: DESCRIPTORS: TENDER

## 2022-08-10 ASSESSMENT — PAIN SCALES - GENERAL
PAINLEVEL_OUTOF10: 0
PAINLEVEL_OUTOF10: 9
PAINLEVEL_OUTOF10: 0
PAINLEVEL_OUTOF10: 0

## 2022-08-10 ASSESSMENT — PAIN - FUNCTIONAL ASSESSMENT: PAIN_FUNCTIONAL_ASSESSMENT: ACTIVITIES ARE NOT PREVENTED

## 2022-08-10 ASSESSMENT — PAIN DESCRIPTION - LOCATION: LOCATION: RIB CAGE

## 2022-08-10 ASSESSMENT — PAIN DESCRIPTION - ORIENTATION: ORIENTATION: RIGHT

## 2022-08-10 ASSESSMENT — PAIN DESCRIPTION - ONSET: ONSET: ON-GOING

## 2022-08-10 NOTE — PROGRESS NOTES
Hospitalist Progress Note      PCP: HECTOR Lyon - CNP    Date of Admission: 7/18/2022    Chief Complaint: respiratory distress    Hospital Course:    Pt is an 78y.o. year-old female with a history of hyperlipidemia, fibromyalgia, chronic pain syndrome, severe COPD with chronic hypoxic respiratory failure requiring oxygen at 4 L/min via nasal cannula continuously. She presents to the emergency room for evaluation following a 2 to 3-day history of worsening shortness of breath. She was recently treated for Pseudomonas pneumonia and discharged to rehab. EMS reports that she was on a very long oxygen tube when they arrived. She was placed on 6 L nasal cannula and her oxygen saturation improved. In the emergency room she was found to have an abnormal EKG with inverted T waves in V2 through V5. Cardiology was consulted and asked that she be put on a heparin drip. Patient denies any current or recent chest pain. She is being admitted for further evaluation and treatment. Associated signs and symptoms do not include chest pain, lightheaded, dizziness, diaphoresis, edema, orthopnea, paroxysmal nocturnal dyspnea, fever or chills. No recent medication changes. 8/10  Patient continues to require oxygen per nonrebreather, 12 L. Intermittent need of vasopressors.      Subjective:  as above    Medications:  Reviewed    Infusion Medications    norepinephrine Stopped (08/10/22 0446)    sodium chloride 10 mL/hr at 08/07/22 0850    dextrose       Scheduled Medications    tobramycin (PF)  300 mg Nebulization BID    clopidogrel  75 mg Oral Daily    methylPREDNISolone  40 mg IntraVENous Daily    ipratropium-albuterol  1 ampule Inhalation 4x daily    oxymetazoline  2 spray Each Nostril BID    aspirin  325 mg Oral Once    aspirin  81 mg Oral Daily    sodium chloride (Inhalant)  15 mL Nebulization Q4H While awake    fluconazole  100 mg Oral Daily    [Held by provider] enoxaparin  40 mg SubCUTAneous Daily    meropenem  1,000 mg IntraVENous Q8H    nystatin  5 mL Oral 4x Daily    sodium chloride flush  5-40 mL IntraVENous 2 times per day    atorvastatin  40 mg Oral Nightly    tiZANidine  4 mg Oral Nightly    gabapentin  600 mg Oral BID    DULoxetine  60 mg Oral BID    mometasone-formoterol  2 puff Inhalation BID     PRN Meds: potassium chloride, sodium chloride, sodium chloride flush, sodium chloride, acetaminophen, perflutren lipid microspheres, traZODone, ipratropium-albuterol, cetirizine, albuterol sulfate HFA, glucose, dextrose bolus **OR** dextrose bolus, glucagon (rDNA), dextrose, ondansetron, polyethylene glycol      Intake/Output Summary (Last 24 hours) at 8/10/2022 1521  Last data filed at 8/10/2022 1400  Gross per 24 hour   Intake 565.24 ml   Output 900 ml   Net -334.76 ml         Exam:    BP (!) 100/54   Pulse 96   Temp 97.8 °F (36.6 °C) (Axillary)   Resp 22   Ht 5' 4\" (1.626 m)   Wt 131 lb 6.3 oz (59.6 kg)   SpO2 100%   BMI 22.55 kg/m²     General appearance: Anxious+, appears stated age and cooperative. HEENT: Pupils equal, round, and reactive to light. Conjunctivae/corneas clear. Neck: Supple, with full range of motion. No jugular venous distention. Trachea midline. Respiratory:  Comfortably on Bipap. Crackles bibasally. Cardiovascular: Regular rate and rhythm with normal S1/S2 without murmurs, rubs or gallops. Abdomen: Soft, non-tender, non-distended with normal bowel sounds. Musculoskeletal: No clubbing, cyanosis or edema bilaterally. Full range of motion without deformity. Skin: Skin color, texture, turgor normal.  No rashes or lesions. Neurologic:  Neurovascularly intact without any focal sensory/motor deficits.  Cranial nerves: II-XII intact, grossly non-focal.  Psychiatric: Alert and oriented, thought content appropriate, normal insight  Capillary Refill: Brisk,< 3 seconds   Peripheral Pulses: +2 palpable, equal bilaterally       Labs:   Recent Labs     08/08/22  0401 08/09/22  0910 08/10/22  0610   WBC 6.9 8.7 9.0   HGB 7.4* 8.0* 7.5*   HCT 22.3* 24.2* 22.0*    491* 487*       Recent Labs     08/08/22  0401 08/09/22  0910 08/10/22  0610    142 138   K 3.7 4.5 4.8   CL 96* 99 97*   CO2 36* 36* 35*   BUN 11 14 10   CREATININE <0.5* <0.5* <0.5*   CALCIUM 7.9* 8.7 8.8   PHOS  --  3.8 4.2       No results for input(s): AST, ALT, BILIDIR, BILITOT, ALKPHOS in the last 72 hours. No results for input(s): INR in the last 72 hours. No results for input(s): Luh Salazar in the last 72 hours. Urinalysis:      Lab Results   Component Value Date/Time    NITRU Negative 07/22/2022 06:31 AM    WBCUA 2 07/22/2022 06:31 AM    BACTERIA None Seen 07/22/2022 06:31 AM    RBCUA 5 07/22/2022 06:31 AM    BLOODU Negative 07/22/2022 06:31 AM    SPECGRAV 1.049 07/22/2022 06:31 AM    GLUCOSEU Negative 07/22/2022 06:31 AM       Radiology:  Fluoroscopy modified barium swallow with video   Final Result   No evidence of aspiration. Please see separate speech pathology report for full discussion of findings   and recommendations. RECOMMENDATIONS:   Unavailable         XR CHEST PORTABLE   Final Result   Left-sided PICC line tip is in the superior vena cava, the tip is turned   upward superiorly, a change from priors. Moderate right pleural effusion and right lung consolidations similar to   prior study. XR CHEST PORTABLE   Final Result   Increasing opacity throughout portions of the right lung, which can reflect   combination of airspace disease, atelectasis, and pleural effusion. XR CHEST PORTABLE   Final Result   Endotracheal tube with the tip approximately 1 cm above the song. Consider   retraction by approximately 3.5 cm. Enteric tube with the tip and the side   hole below the diaphragm overlying the left upper abdomen, likely within the   fundus of the stomach. Redemonstration of multifocal pneumonia affecting the right lung.   Small   right pleural effusion. XR CHEST PORTABLE   Final Result   Unchanged multifocal right-sided pneumonia. XR CHEST PORTABLE   Final Result   Stable multifocal airspace disease in the right lung with probable upper lobe   abscess. XR CHEST PORTABLE   Final Result   Stable multifocal airspace disease in the right lung, including probable   pulmonary abscess in the right upper lobe. CT CHEST WO CONTRAST   Final Result   1. Mixed consolidative and ground-glass opacities as well as interstitial   thickening throughout the majority of the right lung compatible with   pneumonia. 2. There is a cavity in the right upper lobe demonstrating an air-fluid level   measuring up to 9.7 cm concerning for abscess formation. 3. Trace right pleural effusion. 4. Prominent and enlarged mediastinal lymph nodes, likely reactive. 5. Stable 6 mm left lower lobe pulmonary nodule. RECOMMENDATIONS:   Fleischner Society guidelines for follow-up and management of incidentally   detected pulmonary nodules:      Single Solid Nodule:      Nodule size less than 6 mm   In a low-risk patient, no routine follow-up. In a high-risk patient, optional CT at 12 months. Nodule size equals 6-8 mm   In a low-risk patient, CT at 6-12 months, then consider CT at 18-24 months. In a high-risk patient, CT at 6-12 months, then CT at 18-24 months. Nodule size greater than 8 mm         In a low-risk patient, consider CT at 3 months, PET/CT, or tissue sampling. In a high-risk patient, consider CT at 3 months, PET/CT, or tissue sampling. Multiple Solid Nodules:      Nodule size less than 6 mm   In a low-risk patient, no routine follow-up. In a high-risk patient, optional CT at 12 months. Nodule size equals 6-8 mm   In a low-risk patient, CT at 3-6 months, then consider CT at 18-24 months. In a high-risk patient, CT at 3-6 months, then CT at 18-24 months.       Nodule size greater than 8 mm   In a low-risk patient, CT at 3-6 months, then consider CT at 18-24 months. In a high-risk patient, CT at 3-6 months, then CT at 18-24 months. - Low risk patients include individuals with minimal or absent history of   smoking and other known risk factors. - High risk patients include individuals with a history or smoking or known   risk factors. Radiology 2017 http://pubs. rsna.org/doi/full/10.1148/radiol. 4115071534         XR CHEST PORTABLE   Final Result   1. Increased pneumonia throughout the right lung remaining most prominent in   the right upper lobe. 2. Emphysema better demonstrated on CT. 3. Possible trace right pleural effusion.          XR CHEST PORTABLE   Final Result   Improved aeration of the right chest with persistent consolidation in the mid   to upper right chest.         CT CHEST WO CONTRAST    (Results Pending)           Assessment/Plan:    Active Hospital Problems    Diagnosis Date Noted    Acute respiratory insufficiency [R06.89] 08/04/2022     Priority: Medium    Acute blood loss anemia [D62] 08/04/2022     Priority: Medium    Hypotension due to drugs [I95.2] 08/04/2022     Priority: Medium    Massive hemoptysis [R04.2] 08/03/2022     Priority: Medium    Epistaxis [R04.0] 08/03/2022     Priority: Medium    Peripherally inserted central catheter (PICC) in place [Z45.2] 07/31/2022     Priority: Medium    NSTEMI (non-ST elevated myocardial infarction) ([facility] [address] [I21.4] 07/26/2022     Priority: Medium    Necrotizing pneumonia ([facility] [address] [J85.0] 07/26/2022     Priority: Medium    Multifocal pneumonia [J18.9] 07/26/2022     Priority: Medium    Pseudomonas respiratory infection [J98.8, B96.5] 07/26/2022     Priority: Medium    Abnormal CT of the chest [R93.89] 07/26/2022     Priority: Medium    Acute on chronic respiratory failure with hypoxia and hypercapnia (HCC) [J96.21, J96.22] 07/18/2022     Priority: Medium    Abnormal EKG [R94.31] 07/18/2022     Priority: Medium    Chronic obstructive Sized  Code Status: Limited    PT/OT Eval Status: evaluating    Dispo; Transfer out of ICU.  Can be Dcd in am.    Ezequiel Robb MD

## 2022-08-10 NOTE — PROGRESS NOTES
Pulmonary Critical Care Progress Note     Patient's name:  Dacia Malone  Medical Record Number: [id number]  Patient's account/billing number: [de-identified]  Patient's YOB: 1945  Age: 78 y.o. Date of Admission: 7/18/2022  5:03 PM  Date of Consult: 8/10/2022      Primary Care Physician: HECTOR Ocasio CNP      Code Status: Limited    Chief complaint: acute respiratory failure with hypoxia    Assessment and Plan     Acute respiratory failure with hypoxia  Necrotizing pseudomonas PNA  Massive Epistaxis s/p Rhinorocket removed  Moderately Severe copd     Plan:  Nebulized duoneb QID and hypertonic saline  Dulera  Acapella  O2 to keep sat > 90%  Started on ASA, no bleeding,   Antibiotics per ID, meropenem and Arnie        Overnight:  Sob  Cough  Diff clearing secretions  On 15 L    REVIEW OF SYSTEMS:  Review of Systems -   General ROS: negative  Psychological ROS: negative  Ophthalmic ROS: negative  ENT ROS: mild right epistaxis   Allergy and Immunology ROS: negative  Hematological and Lymphatic ROS: negative  Endocrine ROS: negative  Breast ROS: negative  Respiratory ROS: cough and sob  Cardiovascular ROS: no chest pain or dyspnea on exertion  Gastrointestinal ROS:negative  Genito-Urinary ROS: negative  Musculoskeletal ROS: negative  Neurological ROS: negative  Dermatological ROS: negative        Physical Exam:    Vitals: BP (!) 92/59   Pulse (!) 107   Temp 97.2 °F (36.2 °C) (Axillary)   Resp 21   Ht 5' 4\" (1.626 m)   Wt 131 lb 6.3 oz (59.6 kg)   SpO2 100%   BMI 22.55 kg/m²     Last Body weight:   Wt Readings from Last 3 Encounters:   08/09/22 131 lb 6.3 oz (59.6 kg)   07/13/22 133 lb 13.1 oz (60.7 kg)   06/20/22 138 lb (62.6 kg)       Body Mass Index : Body mass index is 22.55 kg/m².       Intake and Output summary:   Intake/Output Summary (Last 24 hours) at 8/10/2022 1118  Last data filed at 8/10/2022 1000  Gross per 24 hour   Intake 565.24 ml   Output 895 ml   Net -329.76 ml       Physical Examination:     Gen:  acutely ill appearing, weak   Eyes: PERRL. Anicteric sclera. No conjunctival injection. ENT: No discharge. Posterior oropharynx clear. External appearance of ears and nose normal.  Neck: Trachea midline. No mass   Resp:  severe rhonchi and diminished on the right, no wheezing, mild increase wob  CV: Regular rate. Regular rhythm. No murmur or rub. No edema. GI: Soft, Non-tender. Non-distended. +BS  Skin: Warm, dry, w/o erythema. Lymph: No cervical or supraclavicular LAD. M/S: No cyanosis. No clubbing. Neuro:  CN 2-12 tested, no focal neurologic deficit. Moves all extremities  Psych: Awake and alert, Oriented x 3. Judgement and insight appropriate. Mood stable. Laboratory findings:-    CBC:   Recent Labs     08/10/22  0610   WBC 9.0   HGB 7.5*   *     BMP:    Recent Labs     08/08/22  0401 08/09/22  0910 08/10/22  0610    142 138   K 3.7 4.5 4.8   CL 96* 99 97*   CO2 36* 36* 35*   BUN 11 14 10   CREATININE <0.5* <0.5* <0.5*   GLUCOSE 170* 113* 106*     S. Calcium:  Recent Labs     08/10/22  0610   CALCIUM 8.8         Radiology Review:  Pertinent images / reports were reviewed as a part of this visit.   CXR reviewed extensive right airspace disease    CC time 31 min                    Dahlia Gonzales MD, M.D.            8/10/2022, 11:18 AM

## 2022-08-10 NOTE — PROGRESS NOTES
Patient requesting pain medication, states she has been getting percocet 5-325. Perfectserve sent to Dr Elizabeth Johnson.

## 2022-08-10 NOTE — FLOWSHEET NOTE
provided emotional support and encouragement for patient. No visitors at this time. Patient wants to go home but realizes that is not possible at this time. Patient seems anxious and restless, fidgeting with items around her chair and table.

## 2022-08-10 NOTE — PROGRESS NOTES
Infectious Disease Follow up Notes  Admit Date: 7/18/2022  Hospital Day: 24    Antibiotics :   IV Meropenem    CHIEF COMPLAINT:      Rt UL lung abscess  Severe Necrotizing PNA  Pseudomonas PNA  Hypoxic resp failure  Epistaxis       Subjective interval History :  78 y. o.woman with a past medical history of COPD, depression, chronic hypoxic respiratory failure now on   4 L of oxygen via nasal cannula, diabetes recent admission for COPD exacerbation and pneumonia diagnosed to have Pseudomonas based on sputum studies. She was on IV cefepime transition to oral levofloxacin on discharge. She was sent to [address] rehab facility and now admitted because of worsening shortness of breath increased sputum production. Sputum culture again on this admission positive for Pseudomonas with some resistance pattern, it is now become resistant to quinolones. CT chest on this admission with progressive pneumonia right upper lobe cavitary lesion possible lung abscess. Extensive consolidation  see images -  This is a new finding on the CT chest compared to her previous CT chest on 6/20/22. Given the necrotizing pneumonia with Pseudomonas infection and lung abscess formation we are consulted for recommendations. Interval History : Remains in ICU using high flow Oxygen mask , sitting up in the chair still short of breath BMP sputum production no epistaxis no hemoptysis. Feels very tired and weak. Tolerating antibiotic therapy okay. Past Medical History:    Past Medical History:   Diagnosis Date    CAD (coronary artery disease) 07/19/2022    NSTEMI, PCI LCx    Chronic pain syndrome     Percocet.  Dr. Olivier Cota    Colorectal polyps     COPD (chronic obstructive pulmonary disease) ([facility] [address]     CTS (carpal tunnel syndrome)     BILATERAL    DDD (degenerative disc disease), lumbar     Depression     Family hx of colon cancer     Fibromyalgia Hyperlipemia     IBS (irritable bowel syndrome)     Lumbar spondylosis     New onset type 2 diabetes mellitus ([facility] [address] 02/03/2020    On home O2     4L    Pneumonia 07/2022    P.aer    Urticaria, chronic        Past Surgical History:    Past Surgical History:   Procedure Laterality Date    BRONCHOSCOPY N/A 8/3/2022    BRONCHOSCOPY performed by Jareth Gonzalez DO at [facility] Bilateral     CATARACT EXTRACTION      CERVICAL POLYP REMOVAL  09/2004    CORONARY ANGIOPLASTY WITH STENT PLACEMENT  07/19/2022    LCx 99. PCI/stent. INTRACAPSULAR CATARACT EXTRACTION Left 06/23/2020    Phacoemulsification with intraocular lens implant performed by Dipti Sanford MD at [address] Right 07/14/2020    Phacoemulsification with intraocular lens implant performed by Dipti Sanford MD at [address]      patient denies        Current Medications:    Outpatient Medications Marked as Taking for the 7/18/22 encounter [facility] Encounter)   Medication Sig Dispense Refill    aspirin 81 MG EC tablet Take 1 tablet by mouth in the morning. 30 tablet 3    prasugrel (EFFIENT) 10 MG TABS Take 1 tablet by mouth in the morning. 30 tablet 1       Allergies:  Patient has no known allergies.     Immunizations :   Immunization History   Administered Date(s) Administered    COVID-19, MODERNA BLUE border, Primary or Immunocompromised, (age 12y+), IM, 100 mcg/0.5mL 03/08/2021, 04/05/2021    COVID-19, MODERNA Booster BLUE border, (age 18y+), IM, 50mcg/0.25mL 12/01/2021    Influenza Vaccine, unspecified formulation 01/10/2017    Influenza, High Dose (Fluzone 65 yrs and older) 11/04/2013, 01/21/2016, 09/01/2017, 10/30/2018, 09/18/2020    Influenza, Quadv, adjuvanted, 65 yrs +, IM, PF (Fluad) 09/27/2021    Influenza, Triv, inactivated, subunit, adjuvanted, IM (Fluad 65 yrs and older) 09/13/2019    Pneumococcal Conjugate 13-valent ([id number]) 01/19/2015    Pneumococcal Conjugate Vaccine 01/10/2017    Pneumococcal Polysaccharide ([id number]) 10/12/2012    Tdap (Boostrix, Adacel) 07/17/2007    Zoster Recombinant (Shingrix) 11/21/2019       Social History:     Social History     Tobacco Use    Smoking status: Every Day     Packs/day: 1.00     Years: 55.00     Pack years: 55.00     Types: Cigarettes     Start date: 1/1/1962    Smokeless tobacco: Never    Tobacco comments:     almost ready to quit   Vaping Use    Vaping Use: Never used   Substance Use Topics    Alcohol use: No     Alcohol/week: 0.0 standard drinks    Drug use: No     Social History     Tobacco Use   Smoking Status Every Day    Packs/day: 1.00    Years: 55.00    Pack years: 55.00    Types: Cigarettes    Start date: 1/1/1962   Smokeless Tobacco Never   Tobacco Comments    almost ready to quit      Family History   Problem Relation Age of Onset    Cancer Father         COLON     Alzheimer's Disease Mother     Heart Disease Brother     Heart Failure Brother     Diabetes Daughter     Diabetes Daughter     Diabetes Daughter           REVIEW OF SYSTEMS:      Constitutional:  negative for fevers, chills, night sweats  Eyes:  negative for blurred vision, eye discharge, visual disturbance   HEENT:  negative for hearing loss, ear drainage,nasal congestion  Respiratory:  r cough,+  shortness of breath++ or hemoptysis   Cardiovascular:  negative for chest pain, palpitations, syncope  Gastrointestinal:  negative for nausea, vomiting, diarrhea, constipation, abdominal pain  Genitourinary:  negative for frequency, dysuria, urinary incontinence, hematuria  Hematologic/Lymphatic:  negative for easy bruising, bleeding and lymphadenopathy  Allergic/Immunologic:  negative for recurrent infections, angioedema, anaphylaxis   Endocrine:  negative for weight changes, polyuria, polydipsia and polyphagia  Musculoskeletal:  negative for joint  pain, swelling, decreased range of motion  Neurological:  negative for headaches, slurred speech, unilateral weakness  Psychiatric: negative for hallucinations,confusion,agitation.              PHYSICAL EXAM:      Vitals:    BP (!) 92/59   Pulse (!) 107   Temp 97.2 °F (36.2 °C) (Axillary)   Resp 21   Ht 5' 4\" (1.626 m)   Wt 131 lb 6.3 oz (59.6 kg)   SpO2 100%   BMI 22.55 kg/m²     General Appearance: awake and in some  acute distress, ++  pallor on e Venturi mask+    Skin: warm and dry, no rash or erythema  Head: normocephalic and atraumatic  Eyes: pupils equal, round, and reactive to light, conjunctivae normal  ENT: tympanic membrane, external ear and ear canal normal bilaterally, nose without deformity, nasal mucosa and turbinates normal without polyps  Neck: supple and non-tender without mass, no thyromegaly  no cervical lymphadenopathy  Pulmonary/Chest:  Rt UL coarse crepts+ +  wheezes, rales or rhonchi, normal air movement, in some respiratory distress  Cardiovascular: normal rate, regular rhythm, normal S1 and S2, no murmurs, rubs, clicks, or gallops, no carotid bruits  Abdomen: soft, non-tender, non-distended, normal bowel sounds, no masses or organomegaly  Extremities: no cyanosis, clubbing or edema  Musculoskeletal: normal range of motion, no joint swelling, deformity or tenderness  Integumentary: No rashes, no abnormal skin lesions, no petechiae  Neurologic: reflexes normal and symmetric, no cranial nerve deficit           Lines: PICC     Data Review:    CBC:   Lab Results   Component Value Date    WBC 9.0 08/10/2022    HGB 7.5 (L) 08/10/2022    HCT 22.0 (L) 08/10/2022    MCV 88.9 08/10/2022     (H) 08/10/2022     RENAL:   Lab Results   Component Value Date    CREATININE <0.5 (L) 08/10/2022    BUN 10 08/10/2022     08/10/2022    K 4.8 08/10/2022    CL 97 (L) 08/10/2022    CO2 35 (H) 08/10/2022     SED RATE: No results found for: SEDRATE  CK: No results found for: CKTOTAL  CRP: No results found for: CRP  Hepatic Function Panel:   Lab Results   Component Value Date/Time    ALKPHOS 88 07/18/2022 05:20 PM    ALT 65 07/18/2022 05:20 PM    AST 61 07/18/2022 05:20 PM    PROT 5.6 07/18/2022 05:20 PM    PROT 6.9 10/12/2012 11:00 AM    BILITOT 0.5 07/18/2022 05:20 PM    LABALBU 2.4 07/18/2022 05:20 PM     UA:  Lab Results   Component Value Date/Time    COLORU Yellow 07/22/2022 06:31 AM    CLARITYU Clear 07/22/2022 06:31 AM    GLUCOSEU Negative 07/22/2022 06:31 AM    BILIRUBINUR Negative 07/22/2022 06:31 AM    BILIRUBINUR neg 10/17/2019 11:50 AM    KETUA TRACE 07/22/2022 06:31 AM    SPECGRAV 1.049 07/22/2022 06:31 AM    BLOODU Negative 07/22/2022 06:31 AM    PHUR 6.5 07/22/2022 06:31 AM    PROTEINU 30 07/22/2022 06:31 AM    UROBILINOGEN 0.2 07/22/2022 06:31 AM    NITRU Negative 07/22/2022 06:31 AM    LEUKOCYTESUR Negative 07/22/2022 06:31 AM    LABMICR YES 07/22/2022 06:31 AM    URINETYPE NotGiven 07/22/2022 06:31 AM      Urine Microscopic:   Lab Results   Component Value Date/Time    LABCAST 10-20 Hyaline 01/10/2017 06:19 PM    BACTERIA None Seen 07/22/2022 06:31 AM    COMU see below 07/03/2022 03:11 PM    HYALCAST 2 07/22/2022 06:31 AM    WBCUA 2 07/22/2022 06:31 AM    RBCUA 5 07/22/2022 06:31 AM    EPIU 1 07/22/2022 06:31 AM     Urine Reflex to Culture:   Lab Results   Component Value Date/Time    URRFLXCULT Not Indicated 07/03/2022 03:11 PM         MICRO: cultures reviewed and updated by me   Blood Culture:          Culture, Respiratory [[id number]] (Abnormal)  Collected: 07/22/22 1200   Order Status: Completed Specimen: Sputum Expectorated Updated: 07/24/22 0801    CULTURE, RESPIRATORY Rare growth normal respiratory fabiana with Abnormal     Gram Stain Result No Epithelial Cells seen   3+ WBC's (Polymorphonuclear)   No organisms seen     Organism Pseudomonas aeruginosa Abnormal     CULTURE, RESPIRATORY Light growth   Narrative:     ORDER#: [id number]                          ORDERED BY: CIRA KEBEDE   SOURCE: Sputum Expectorated COLLECTED:  07/22/22 12:00   ANTIBIOTICS AT GURWINDER.:                      RECEIVED :  07/22/22 12:22   Respiratory Culture [[id number]] (Abnormal)  Collected: 07/19/22 2030   Order Status: Completed Specimen: Sputum Expectorated Updated: 07/23/22 0748    CULTURE, RESPIRATORY Light growth normal respiratory fabiana with Abnormal     Gram Stain Result 2+ Gram positive cocci   2+ WBC's (Polymorphonuclear)   1+ Epithelial Cells     Organism Pseudomonas aeruginosa Abnormal     CULTURE, RESPIRATORY Light growth   Narrative:     ORDER#: [id number]                          ORDERED BY: FARZANA GAN   SOURCE: Sputum Expectorated                COLLECTED:  07/19/22 20:30   ANTIBIOTICS AT GURWINDER.:                      RECEIVED :  07/19/22 21:19   Culture, Blood 1 [[id number]] Collected: 07/18/22 1841   Order Status: Completed Specimen: Blood Updated: 07/22/22 2015    Blood Culture, Routine No Growth after 4 days of incubation. Narrative:     ORDER#: [id number]                          ORDERED BY: Candance Patrick   SOURCE: Blood                              COLLECTED:  07/18/22 18:41   ANTIBIOTICS AT GURWINDER.:                      RECEIVED :  07/18/22 18:56   If child <=2 yrs old please draw pediatric bottle. ~Blood Culture 1   Culture, Blood 2 [[id number]] Collected: 07/18/22 1850   Order Status: Completed Specimen: Blood Updated: 07/22/22 2015    Culture, Blood 2 No Growth after 4 days of incubation. Narrative:     ORDER#: [id number]                          ORDERED BY: Candance Patrick   SOURCE: Blood                              COLLECTED:  07/18/22 18:50   ANTIBIOTICS AT GURWINDER.:                      RECEIVED :  07/18/22 18:56   If child <=2 yrs old please draw pediatric bottle. ~Blood Culture #2   Strep Pneumoniae Antigen [[id number]] Collected: 07/20/22 1020   Order Status: Completed Specimen: Urine, clean catch Updated: 07/21/22 0841    STREP PNEUMONIAE ANTIGEN, URINE --    Presumptive Negative   Presumptive negative suggests no current or recent   pneumococcal infection. Infection due to Strep pneumoniae   cannot be ruled out since the antigen present in the sample   may be below the detection limit of the test.   Normal Range:Presumptive Negative    Narrative:     ORDER#: [id number]                          ORDERED BY: FARZANA GAN   SOURCE: Urine Clean Catch                  COLLECTED:  07/20/22 10:20   ANTIBIOTICS AT GURWINDER.:                      RECEIVED :  07/20/22 10:27   Legionella antigen, urine [[id number]] Collected: 07/20/22 1020   Order Status: Completed Specimen: Urine, catheter Updated: 07/21/22 0840    L. pneumophila Serogp 1 Ur Ag --    Presumptive Negative   No Legionella pneumophila serogroup 1 antigens detected. A negative result does not exclude infection with   Legionella pneumophila serogroup 1 nor does it rule out   other microbial-caused respiratory infections or   disease caused by other serogroups of   Legionella pneumophila. Normal Range: Presumptive Negative    Narrative:     ORDER#: [id number]                          ORDERED BY: FARZANA GAN   SOURCE: Urine Catheter                     COLLECTED:  07/20/22 10:20   ANTIBIOTICS AT GURWINDER.:                      RECEIVED :  07/20/22 10:28   Sputum gram stain [[id number]] Collected: 07/19/22 2030   Order Status: Canceled Specimen: Sputum Expectorated    Rapid influenza A/B antigens [[id number]] Collected: 07/19/22 0210   Order Status: Completed Specimen: Nares Updated: 07/19/22 0245    Rapid Influenza A Ag Negative    Rapid Influenza B Ag Negative   COVID-19, Rapid [[id number]] Collected: 07/18/22 1825   Order Status: Completed Specimen: Nasopharyngeal Swab Updated: 07/18/22 1854    SARS-CoV-2, NAAT Not Detected    Comment: Rapid NAAT:   Negative results should be treated as presumptive and,   if inconsistent with clinical signs and symptoms or necessary for   patient management, should be tested with an alternative molecular   assay.  Negative results do not preclude SARS-CoV-2 infection and   should not be used as the sole basis for patient management decisions. This test has been authorized by the FDA under an Emergency Use   Authorization (EUA) for use by authorized laboratories. Fact sheet for Healthcare Providers:   Maykel.prashanth   Fact sheet for Patients: Maykel.[email or web address]     METHODOLOGY: Isothermal Nucleic Acid Amplification         CULTURE, RESPIRATORY Rare growth normal respiratory fabiana with Abnormal     Gram Stain Result No Epithelial Cells seen   3+ WBC's (Polymorphonuclear)   No organisms seen    Organism Pseudomonas aeruginosa Abnormal     CULTURE, RESPIRATORY Light growth    Resulting Agency [facility] [address] [facility]          Susceptibility      Pseudomonas aeruginosa (1)    Antibiotic Interpretation Microscan  Method Status    cefepime Sensitive 8 mcg/mL BACTERIAL SUSCEPTIBILITY PANEL BY TIERRA     ciprofloxacin Resistant >2 mcg/mL BACTERIAL SUSCEPTIBILITY PANEL BY TIERRA     gentamicin Sensitive <=4 mcg/mL BACTERIAL SUSCEPTIBILITY PANEL BY TIERRA     meropenem Sensitive <=1 mcg/mL BACTERIAL SUSCEPTIBILITY PANEL BY TIERRA     piperacillin-tazobactam Sensitive <=16 mcg/mL BACTERIAL SUSCEPTIBILITY PANEL BY TIERRA     tobramycin Sensitive <=4 mcg/mL BACTERIAL SUSCEPTIBILITY PANEL BY TIERRA          Narrative  Performed by: [facility] [address] [facility]  ORDER#: [id number]                          ORDERED BY: CIRA KEBEDE   SOURCE: Sputum Expectorated                COLLECTED:  07/22/22 12:00   ANTIBIOTICS AT GURWINDER.:                      RECEIVED :  07/22/22 12:22           Lab Results   Component Value Date/Time    BC No Growth after 4 days of incubation. 07/18/2022 06:41 PM    BLOODCULT2 No Growth after 4 days of incubation.  07/18/2022 06:50 PM       Respiratory Culture:  Lab Results   Component Value Date/Time    CULTRESP Normal respiratory fabiana 07/30/2022 04:00 PM    LABGRAM  07/30/2022 04:00 PM     3+ WBC's (Mononuclear)  1+ Epithelial Cells  1+ Gram positive cocci       AFB:No results found for: AFBSMEAR  Viral Culture:  Lab Results   Component Value Date/Time    COVID19 Not Detected 08/01/2022 01:55 PM     Urine Culture: No results for input(s): Pedro Pill in the last 72 hours. IMAGING:    Fluoroscopy modified barium swallow with video   Final Result   No evidence of aspiration. Please see separate speech pathology report for full discussion of findings   and recommendations. RECOMMENDATIONS:   Unavailable         XR CHEST PORTABLE   Final Result   Left-sided PICC line tip is in the superior vena cava, the tip is turned   upward superiorly, a change from priors. Moderate right pleural effusion and right lung consolidations similar to   prior study. XR CHEST PORTABLE   Final Result   Increasing opacity throughout portions of the right lung, which can reflect   combination of airspace disease, atelectasis, and pleural effusion. XR CHEST PORTABLE   Final Result   Endotracheal tube with the tip approximately 1 cm above the song. Consider   retraction by approximately 3.5 cm. Enteric tube with the tip and the side   hole below the diaphragm overlying the left upper abdomen, likely within the   fundus of the stomach. Redemonstration of multifocal pneumonia affecting the right lung. Small   right pleural effusion. XR CHEST PORTABLE   Final Result   Unchanged multifocal right-sided pneumonia. XR CHEST PORTABLE   Final Result   Stable multifocal airspace disease in the right lung with probable upper lobe   abscess. XR CHEST PORTABLE   Final Result   Stable multifocal airspace disease in the right lung, including probable   pulmonary abscess in the right upper lobe. CT CHEST WO CONTRAST   Final Result   1. Mixed consolidative and ground-glass opacities as well as interstitial   thickening throughout the majority of the right lung compatible with   pneumonia.    2. There is a cavity in the right upper lobe demonstrating an air-fluid level   measuring up to 9.7 cm concerning for abscess formation. 3. Trace right pleural effusion. 4. Prominent and enlarged mediastinal lymph nodes, likely reactive. 5. Stable 6 mm left lower lobe pulmonary nodule. RECOMMENDATIONS:   Fleischner Society guidelines for follow-up and management of incidentally   detected pulmonary nodules:      Single Solid Nodule:      Nodule size less than 6 mm   In a low-risk patient, no routine follow-up. In a high-risk patient, optional CT at 12 months. Nodule size equals 6-8 mm   In a low-risk patient, CT at 6-12 months, then consider CT at 18-24 months. In a high-risk patient, CT at 6-12 months, then CT at 18-24 months. Nodule size greater than 8 mm         In a low-risk patient, consider CT at 3 months, PET/CT, or tissue sampling. In a high-risk patient, consider CT at 3 months, PET/CT, or tissue sampling. Multiple Solid Nodules:      Nodule size less than 6 mm   In a low-risk patient, no routine follow-up. In a high-risk patient, optional CT at 12 months. Nodule size equals 6-8 mm   In a low-risk patient, CT at 3-6 months, then consider CT at 18-24 months. In a high-risk patient, CT at 3-6 months, then CT at 18-24 months. Nodule size greater than 8 mm   In a low-risk patient, CT at 3-6 months, then consider CT at 18-24 months. In a high-risk patient, CT at 3-6 months, then CT at 18-24 months. - Low risk patients include individuals with minimal or absent history of   smoking and other known risk factors. - High risk patients include individuals with a history or smoking or known   risk factors. Radiology 2017 http://pubs. rsna.org/doi/full/10.1148/radiol. 2523889927         XR CHEST PORTABLE   Final Result   1. Increased pneumonia throughout the right lung remaining most prominent in   the right upper lobe. 2. Emphysema better demonstrated on CT. 3. Possible trace right pleural effusion. XR CHEST PORTABLE   Final Result   Improved aeration of the right chest with persistent consolidation in the mid   to upper right chest.         CT CHEST WO CONTRAST    (Results Pending)     XR CHEST PORTABLE   Final Result   1. Increased pneumonia throughout the right lung remaining most prominent in   the right upper lobe. 2. Emphysema better demonstrated on CT. 3. Possible trace right pleural effusion. XR CHEST PORTABLE   Final Result   Improved aeration of the right chest with persistent consolidation in the mid   to upper right chest.                All pertinent images and reports for the current Hospitalization were reviewed by me.        Scheduled Meds:   magnesium sulfate  2,000 mg IntraVENous Once    tobramycin (PF)  300 mg Nebulization BID    clopidogrel  75 mg Oral Daily    methylPREDNISolone  40 mg IntraVENous Daily    ipratropium-albuterol  1 ampule Inhalation 4x daily    oxymetazoline  2 spray Each Nostril BID    aspirin  325 mg Oral Once    aspirin  81 mg Oral Daily    sodium chloride (Inhalant)  15 mL Nebulization Q4H While awake    fluconazole  100 mg Oral Daily    [Held by provider] enoxaparin  40 mg SubCUTAneous Daily    meropenem  1,000 mg IntraVENous Q8H    nystatin  5 mL Oral 4x Daily    sodium chloride flush  5-40 mL IntraVENous 2 times per day    atorvastatin  40 mg Oral Nightly    tiZANidine  4 mg Oral Nightly    gabapentin  600 mg Oral BID    DULoxetine  60 mg Oral BID    mometasone-formoterol  2 puff Inhalation BID       Continuous Infusions:   norepinephrine Stopped (08/10/22 0446)    sodium chloride 10 mL/hr at 08/07/22 0850    dextrose         PRN Meds:  potassium chloride, sodium chloride, sodium chloride flush, sodium chloride, acetaminophen, perflutren lipid microspheres, traZODone, ipratropium-albuterol, cetirizine, albuterol sulfate HFA, glucose, dextrose bolus **OR** dextrose bolus, glucagon (rDNA), dextrose, ondansetron, polyethylene glycol      Assessment:     Patient Active Problem List   Diagnosis    Osteopenia-last dexa 2009 repeat 2015 (-2.4 hip)--advised tx    Colon polyp, hyperplastic,-(done 3/11-repeat 3-5 yrs--dr Quang Weaver)    Family history of colon cancer    CTS (carpal tunnel syndrome)BILATERAL-s/p surgical repair--resolved     Postmenopausal status-last mammogram 2/15 wnl last pap 12/13--wnl    Nondependent alcohol abuse, in remission    Urticaria, chronic    Smoking    Chronic back pain-(djd) saw dr Lillie Feliciano afford surgery--seeing dr Katerina Lange for pain meds    Fibromyalgia    Hypercholesteremia    Lumbar spondylosis    Vitamin D deficiency--severe--started 50,000 iu 2x/ wk 11/13    Insomnia--on elevil w help    Constipation--on daily miralax    Depression    Chronic pain syndrome    Muscle cramping    Acute on chronic respiratory failure with hypercapnia ([address]    ROLY (acute kidney injury) ([address]    Severe sepsis (HCC)    Gastroesophageal reflux disease    COPD, severe ([address]    Chronic hypoxemic respiratory failure ([address]    Degeneration of lumbar or lumbosacral intervertebral disc    Trochanteric bursitis of right hip    COPD exacerbation ([address]    Diabetes ([address]    Controlled type 2 diabetes mellitus without complication, without long-term current use of insulin ([address]    Age-related osteoporosis without current pathological fracture- started alendronate 9/2021    Pulmonary nodule seen on imaging study    Pneumonia of right lung due to infectious organism    General weakness    Elevated lactic acid level    Community acquired pneumonia of right lung    Chronic obstructive pulmonary disease ([address]    Acute respiratory failure with hypoxia (HCC)    Acute on chronic respiratory failure with hypoxia and hypercapnia (HCC)    Abnormal EKG    NSTEMI (non-ST elevated myocardial infarction) ([address]    Necrotizing pneumonia (HCC)    Multifocal pneumonia    Pseudomonas respiratory infection    Abnormal CT of the chest Peripherally inserted central catheter (PICC) in place    Massive hemoptysis    Epistaxis    Acute respiratory insufficiency    Acute blood loss anemia    Hypotension due to drugs     Severe necrotizing Pseudomonas pneumonia  Right upper lobe cavitary lesion  Right right lung evolving abscess  Right lung dense consolidation of  Pseudomonas pneumonia developing some resistance  COPD exacerbation  Hypoxic respiratory failure  Coronary artery disease  Status post cardiac cath  Chronic smoking  Abnormal CT chest  BNP elevation  COVID-19 negative  Epistaxis severe vs Hemoptysis now intubated sedated on the ventilator for airway protection 8/3/22   Now extubated to Venturi mask  Rhino Rocket removed by ENT      Unfortunately she developed progressive changes with dense consolidation and lung abscess secondary to Pseudomonas pneumonia. Pseudomonas appears to have developed resistance to quinolones while on therapy this is concerning.   CT chest on this admission grossly abnormal and definitely there is progressive changes given the severity of the -pneumonia will need IV antibiotics     OPAT d/w pt she needs to QUIT smoking d/w pt and her family        Given the lung findings will have to continue antibiotic therapy anticipate least 2 more weeks of duration of treatment    repeat sputum testing to see if Pseudomonas is clearing out on the current therapy-sputum repeat Normal fabiana noted      transferred to ICU secondary to massive epistaxis versus was  intubated on the ventilator status post bronchoscopy      Extubated on 8/4/22 and on Venturi mask has Rhinorocket in place ENT notes reviewed      Still has mild epistaxis noted better controlled ENT is following closely and the current antibiotics will provide sinus coverage     May repeat CT chest before d/c is she stays longer as in patient     Rhino Rocket removed by ENT today no further epistaxis noted tolerating antibiotic therapy okay    She is still sob and CXR Rt side still worse and given her age and severe infection risk for progression -     Will check CT chest tomorrow to assess response to IV antibiotic and determine the  duration of antibiotic. Labs, Microbiology, Radiology and all the pertinent results from current hospitalization and  care every where were reviewed  by me as a part of the evaluation   Plan:   Cont  IV Meropenem 1 gm q 8 HR X stop date  x  8/30  Will change to Q 12 hr DOSING AT ad/c as SNF unable to do Q 8hr dosing for dc planning   Cont INH - Tobramycin for now Q 12 hrs  4 . Picc line in place   5 QUIT smoking  6. Will repeat CT chest as in patient if she stays longer in house  7 Cont supportive care  8. CT images reviewed see above    9. Risk for progression  10 on  Venturi mask  11. Repeat sputum cleared   12. Robby DONE   13, ENT following for massive Epistaxis - controlled with Rhino rocket - Rhino Rocket removed   14. CXR not improving much is of concern risk for intubation -  15 Check CT CHEST TOMORROW         Discussed with patient/Family and Nursing   Risk of Complications/Morbidity: High      Illness(es)/ Infection present that pose threat to bodily function. There is potential for severe exacerbation of infection/side effects of treatment. Therapy requires intensive monitoring for antimicrobial agent toxicity. Thanks for allowing me to participate in your patient's care and please call me with any questions or concerns.     Toyin Falcon MD  Infectious Disease  [facility]) Physician  Phone: [phone]   Fax : [phone]

## 2022-08-10 NOTE — PLAN OF CARE
Problem: Discharge Planning  Goal: Discharge to home or other facility with appropriate resources  Outcome: Progressing     Problem: Pain  Goal: Verbalizes/displays adequate comfort level or baseline comfort level  Outcome: Progressing     Problem: Confusion  Goal: Confusion, delirium, dementia, or psychosis is improved or at baseline  Description: INTERVENTIONS:  1. Assess for possible contributors to thought disturbance, including medications, impaired vision or hearing, underlying metabolic abnormalities, dehydration, psychiatric diagnoses, and notify attending LIP  2. [place] high risk fall precautions, as indicated  3. Provide frequent short contacts to provide reality reorientation, refocusing and direction  4. Decrease environmental stimuli, including noise as appropriate  5. Monitor and intervene to maintain adequate nutrition, hydration, elimination, sleep and activity  6. If unable to ensure safety without constant attention obtain sitter and review sitter guidelines with assigned personnel  7. Initiate Psychosocial CNS and Spiritual Care consult, as indicated  Outcome: Progressing     Problem: Skin/Tissue Integrity  Goal: Absence of new skin breakdown  Description: 1. Monitor for areas of redness and/or skin breakdown  2. Assess vascular access sites hourly  3. Every 4-6 hours minimum:  Change oxygen saturation probe site  4. Every 4-6 hours:  If on nasal continuous positive airway pressure, respiratory therapy assess nares and determine need for appliance change or resting period.   Outcome: Progressing     Problem: Safety - Adult  Goal: Free from fall injury  Outcome: Progressing     Problem: ABCDS Injury Assessment  Goal: Absence of physical injury  Outcome: Progressing     Problem: Nutrition Deficit:  Goal: Optimize nutritional status  Outcome: Progressing

## 2022-08-10 NOTE — PROGRESS NOTES
Physical Therapy  Facility/Department: [address] ICU  Physical TherapyTreatment Note    Name: Aurora Mc  : 1945  MRN: [id number]  Date of Service: 8/10/2022    Discharge Recommendations:  Continue to assess pending progress, Subacute/Skilled Nursing Facility   PT Equipment Recommendations  Other: Defer to next level of care. Aurora Mc scored a 12/ on the AM-PAC short mobility form. Current research shows that an AM-PAC score of 17 or less is typically not associated with a discharge to the patient's home setting. Based on the patient's AM-PAC score and their current functional mobility deficits, it is recommended that the patient have 3-5 sessions per week of Physical Therapy at d/c to increase the patient's independence. Please see assessment section for further patient specific details. If patient discharges prior to next session this note will serve as a discharge summary. Please see below for the latest assessment towards goals. Assessment   Body Structures, Functions, Activity Limitations Requiring Skilled Therapeutic Intervention: Decreased functional mobility ; Decreased strength;Decreased endurance  Assessment: 77 y/o female admit 2022 with Pneumonia, Elevated Troponin. 2022 Cardiac Cath : NSTEMI.  8/3/2022 Pt transfer to ICU with worsening Respiratory Concerns. 8/l3-2022 Pt Intubated. Recent hospital admit 7/3-2022 (admit from home) and d/c to SNF setting. PMH as noted including COPD, Lumbar Spondlyosis, Fibromyalgia. Pt admit from SNF setting; prior pt living with sign other in apt setting. Pt currently requiring O2 via NRB. O2 sats remain 90's at rest; desat briefly 70's during brief oob, stand via [place] although quickly recovers at least 90% following return to chair. Pt rell eob/oob via Stedy with Min assist.   BP Supine :  87/57 (67). BP EOB :  121/60 (74). Very slow progress given respiratory issues.   At this time, would recommend cont PT (3-5) upon d/c. Therapy Prognosis: Fair  History: 77 y/o female admit 7/18/2022 with Pneumonia, Elevated Troponin. 7/19/2022 Cardiac Cath : NSTEMI.  8/3/2022 Pt transfer to ICU with worsening Respiratory Concerns. 8/l3-8/4/2022 Pt Intubated. Recent hospital admit 7/3-7/13/2022 (admit from home) and d/c to SNF setting. PMH as noted including COPD, Lumbar Spondlyosis, Fibromyalgia. Exam: See above. Clinical Presentation: See above. Barriers to Learning: Endurance. Requires PT Follow-Up: Yes  Activity Tolerance  Activity Tolerance: Patient limited by endurance  Activity Tolerance Comments: Pt currently requiring O2 via NRB. O2 sats remain 90's at rest; desat briefly 70's during brief oob, stand via [facility] although quickly recovers at least 90% following return to chair. Pt rell eob/oob via Stedy with Min assist.   BP Supine :  87/57 (67). BP EOB :  121/60 (74). Plan   Plan  Plan: 3-5 times per week  Current Treatment Recommendations: Strengthening, Functional mobility training, Transfer training, Gait training, Endurance training, Safety education & training, Patient/Caregiver education & training  Safety Devices  Type of Devices: Call light within reach, Nurse notified, Left in chair     Restrictions  Restrictions/Precautions  Restrictions/Precautions: Fall Risk  Position Activity Restriction  Other position/activity restrictions: O2 via NRB. (No supplemental oxygen by nasal cannula per ENT note 8/7); Subjective   General  Chart Reviewed: Yes  Patient assessed for rehabilitation services?: Yes  Additional Pertinent Hx: 77 y/o female admit 7/18/2022 with Pneumonia, Elevated Troponin. 7/19/2022 Cardiac Cath : NSTEMI.  8/3/2022 Pt transfer to ICU with worsening Respiratory Concerns. 8/l3-8/4/2022 Pt Intubated. Recent hospital admit 7/3-7/13/2022 (admit from home) and d/c to SNF setting. PMH as noted including COPD, Lumbar Spondlyosis, Fibromyalgia.   Response To Previous Treatment: Patient with no complaints from previous session. Family / Caregiver Present: No  Referring Practitioner: Nora Sepulveda NP. Follows Commands: Within Functional Limits  Subjective  Subjective: Pt agreeable to PT Rx. Pt wants to be able to walk, be able to return home. Social/Functional History  Social/Functional History  Lives With: Significant other (Ray)  Type of Home: Apartment (1st floor.)  Home Layout: One level (laundry in apt)  Home Access: Level entry  Bathroom Shower/Tub: Tub/Shower unit  Bathroom Toilet: Standard  Bathroom Equipment: Shower chair  Bathroom Accessibility: Accessible  Home Equipment: [employer]  ADL Assistance: Independent  Homemaking Assistance: Independent (Shared with sign other.)  Ambulation Assistance: Independent (Without assist device.)  Transfer Assistance: Independent  Active : No (Sign other drives.)  Patient's  Info: significant other drives  Occupation: Retired  Additional Comments: Pt recent admit 7/3/2022 with COPD Exac, ROLY, UTI and Sepsis and d/c'd on 7/13/2022 to Home at [facility] SNF setting. Vision/Hearing  Vision  Vision: Impaired  Vision Exceptions: Wears glasses for reading  Hearing  Hearing: Within functional limits    Cognition   Orientation  Overall Orientation Status:  (Somewhat confused to recent events/situation)  Orientation Level: Oriented to person;Oriented to place;Oriented to time  Cognition  Overall Cognitive Status: WFL     Objective                      Bed mobility  Supine to Sit: Minimal assistance (HOB elevated, use of bed rail)  Transfers  Sit to Stand: Minimal Assistance ([employer]  Stand to sit: Minimal Assistance ([employer]  Bed to Chair: Dependent/Total ([employer]  Comment: Additional Transfer from chair via [employer] assist.        Balance  Comments: Pt able to maintain static stand via Stedy ~ 1 min CGA/Min assist; able to lat wgt shift, initiate few steps in place in prep amb activities.              AM-PAC Score  AM-PAC Inpatient Mobility Raw Score : 12 (08/10/22 1047)  AM-PAC Inpatient T-Scale Score : 35.33 (08/10/22 1047)  Mobility Inpatient CMS 0-100% Score: 68.66 (08/10/22 1047)  Mobility Inpatient CMS G-Code Modifier : CL (08/10/22 1047)            Goals  Short Term Goals  Time Frame for Short term goals: Upon d/c acute care setting. Short term goal 1: Bed Mob SBA. Short term goal 2: Transfers with assist device CGA. Short term goal 3: Amb with assist device 25' CGA. Short term goal 4: SpO2 levels to stay >90% with activity  Patient Goals   Patient goals : Get stronger and eventually return home. Education  Patient Education  Education Given To: Patient  Education Provided Comments: Role of PT, POC, Need to call for assist, OOB via Stedy.   Education Method: Verbal;Demonstration  Education Outcome: Continued education needed      Therapy Time   Individual Concurrent Group Co-treatment   Time In 0840         Time Out 0910         Minutes [address], PT

## 2022-08-10 NOTE — PROGRESS NOTES
Cardiology Progress    Mitchell Feng  1945    PCP: HECTOR Palumbo CNP  Referring Physician: Dr Sammy Covarrubias  Reason for Referral: Elevated troponin  Chief Complaint:   Chief Complaint   Patient presents with    Shortness of Breath     At [facility] for rehab for pneumonia increased sob over the last few days. Pt on 4-5 lpm via nc all the time. Per EMS the cord was very long ems placed on 6lpm via nc and o2 sat came up to 99%       Interval history:  Doing significantly better today, more awake  Tolerating asa without epistaxis        Subjective:      History of Present Illness: The patient is 78 y.o. female with a past medical history significant for COPD on 4 L oxygen at home, HLD, depression,  who presents with SOB. Patient was recently hospitalized during 2 weeks for Pseudomonas pneumonia and was discharged 4 days ago to our facility. Patient shortness of breath did not improve despite antibiotic therapy and was asked to go to the hospital again. EMS placed the patient on 6 L nasal canula on arrival.  On evaluation in the emergency department EKG showed what T wave inversion in V2-5. Aspirin and heparin was started. Patient has not had chest pain since admission. Patient was also started on breathing treatment, Cefepime and azithromycin. On my evaluation patient was on 5 L on oxygen with SpO2 >90%. Patient looked tired and short of breath when she talks. Patient denies any  chest pain, palpitations, syncope, orthopnea. Past Medical History:   Diagnosis Date    CAD (coronary artery disease) 07/19/2022    NSTEMI, PCI LCx    Chronic pain syndrome     Percocet.  Dr. Nabeel Johnson    Colorectal polyps     COPD (chronic obstructive pulmonary disease) ([facility] [address]     CTS (carpal tunnel syndrome)     BILATERAL    DDD (degenerative disc disease), lumbar     Depression     Family hx of colon cancer     Fibromyalgia     Hyperlipemia     IBS (irritable bowel syndrome)     Lumbar spondylosis aspirin chewable tablet 81 mg  81 mg Oral Daily Sunshine Davidson MD   81 mg at 08/09/22 0835    sodium chloride (Inhalant) 3 % nebulizer solution 15 mL  15 mL Nebulization Q4H While awake Bob Pry, DO   15 mL at 08/09/22 2052    potassium chloride 20 mEq/50 mL IVPB (Central Line)  20 mEq IntraVENous PRN Gareth Chong DO        norepinephrine (LEVOPHED) 16 mg in dextrose 5% 250 mL infusion  1-100 mcg/min IntraVENous Continuous HECTOR Neville CNP   Stopped at 08/09/22 1835    fluconazole (DIFLUCAN) tablet 100 mg  100 mg Oral Daily Kat Contreras MD   100 mg at 08/09/22 0835    sodium chloride (OCEAN, BABY AYR) 0.65 % nasal spray 1 spray  1 spray Each Nostril PRN Trevor Nevarez MD        [facility] by provider] enoxaparin (LOVENOX) injection 40 mg  40 mg SubCUTAneous Daily HECTOR Neville CNP   40 mg at 08/03/22 2966    meropenem (MERREM) 1,000 mg in sodium chloride 0.9 % 100 mL IVPB (mini-bag)  1,000 mg IntraVENous Q8H Stanford Hayes MD 33.3 mL/hr at 08/09/22 2140 1,000 mg at 08/09/22 2140    nystatin (MYCOSTATIN) 832624 UNIT/ML suspension 500,000 Units  5 mL Oral 4x Daily HECTOR Mon CNP   500,000 Units at 08/09/22 1946    [Held by provider] prasugrel (EFFIENT) tablet 10 mg  10 mg Oral Daily Sunshine Davidson MD   10 mg at 08/03/22 1217    sodium chloride flush 0.9 % injection 5-40 mL  5-40 mL IntraVENous 2 times per day Sunshine Davidson MD   10 mL at 08/09/22 1951    sodium chloride flush 0.9 % injection 5-40 mL  5-40 mL IntraVENous PRN Sunshine Davidson MD        0.9 % sodium chloride infusion   IntraVENous PRN Sunshine Davidson MD 10 mL/hr at 08/07/22 0850 New Bag at 08/07/22 0850    acetaminophen (TYLENOL) tablet 650 mg  650 mg Oral Q4H PRN Sunshine Davidson MD   650 mg at 08/09/22 1218    perflutren lipid microspheres (DEFINITY) injection 1.65 mg  1.5 mL IntraVENous ONCE PRN Alexander Huggins MD        atorvastatin (LIPITOR) tablet 40 mg  40 mg Oral Nightly Taylor Kent Mauricio Hall MD   40 mg at 08/09/22 1946    traZODone (DESYREL) tablet 100 mg  100 mg Oral Nightly PRN Roslyn Lainez MD   100 mg at 08/07/22 2012    tiZANidine (ZANAFLEX) tablet 4 mg  4 mg Oral Nightly Roslyn Lainez MD   4 mg at 08/09/22 1946    ipratropium-albuterol (Merissa Pencil) nebulizer solution 1 ampule  1 ampule Inhalation Q4H PRN Roslyn Lainez MD   1 ampule at 07/27/22 1229    gabapentin (NEURONTIN) capsule 600 mg  600 mg Oral BID Roslyn Lainez MD   600 mg at 08/09/22 1945    DULoxetine (CYMBALTA) extended release capsule 60 mg  60 mg Oral BID Roslyn Lainez MD   60 mg at 08/09/22 1945    cetirizine (ZYRTEC) tablet 10 mg  10 mg Oral Daily PRN Roslyn Lainez MD   10 mg at 07/20/22 1018    albuterol sulfate HFA (PROVENTIL;VENTOLIN;PROAIR) 108 (90 Base) MCG/ACT inhaler 2 puff  2 puff Inhalation Q6H PRN Roslyn Lainez MD   2 puff at 08/03/22 0759    glucose chewable tablet 16 g  4 tablet Oral PRN Roslyn Lainez MD        dextrose bolus 10% 125 mL  125 mL IntraVENous PRN Roslyn Lainez MD        Or    dextrose bolus 10% 250 mL  250 mL IntraVENous PRN Roslyn Lainez MD        glucagon (rDNA) injection 1 mg  1 mg IntraMUSCular PRN Roslyn Lainez MD        dextrose 5 % solution  100 mL/hr IntraVENous PRN Roslyn Lainez MD        ondansetron [facility]) injection 4 mg  4 mg IntraVENous Q4H PRN Roslyn Lainez MD   4 mg at 08/07/22 2015    polyethylene glycol (GLYCOLAX) packet 17 g  17 g Oral Daily PRN Roslyn Lainez MD        mometasone-formoterol [facility]) 200-5 MCG/ACT inhaler 2 puff  2 puff Inhalation BID Eloise Pares, RPH   2 puff at 08/09/22 2052       Review of Systems:  Unable to obtain    Physical Exam:   /88   Pulse (!) 108   Temp 97.9 °F (36.6 °C) (Axillary)   Resp 19   Ht 5' 4\" (1.626 m)   Wt 131 lb 6.3 oz (59.6 kg)   SpO2 91%   BMI 22.55 kg/m²   Wt Readings from Last 3 Encounters:   08/09/22 131 lb 6.3 oz (59.6 kg)   07/13/22 133 lb 13.1 oz (60.7 kg)   06/20/22 138 lb (62.6 kg)     Constitutional: appears ill, lethargic and poorly responsive   Head: Normocephalic and atraumatic. Pupils equal and round. Neck: Neck supple. No JVP or carotid bruit appreciated. No mass and no thyromegaly present. No lymphadenopathy present. Cardiovascular: Normal rate. Normal heart sounds. Exam reveals no gallop and no friction rub. No murmur heard. Pulmonary/Chest: diffuse rhonchi   Abdominal: Soft, non-tender. Bowel sounds are normal. She exhibits no organomegaly, mass or bruit. Extremities: No edema. No cyanosis or clubbing. Pulses are 2+ radial/carotid bilaterally. Neurological: No gross cranial nerve deficit. Coordination normal.   Skin: Skin is warm and dry. There is no rash or diaphoresis. Psychiatric: She has a normal mood and affect. Her speech is normal and behavior is normal.     Lab Review:   FLP:    Lab Results   Component Value Date/Time    TRIG 146 02/17/2022 10:29 AM    HDL 59 02/17/2022 10:29 AM    LDLCALC 83 02/17/2022 10:29 AM    LDLDIRECT 176 10/12/2012 11:00 AM    LABVLDL 29 02/17/2022 10:29 AM     BUN/Creatinine:    Lab Results   Component Value Date/Time    BUN 14 08/09/2022 09:10 AM    CREATININE <0.5 08/09/2022 09:10 AM     PT/INR, TNI, HGB A1C:   Lab Results   Component Value Date/Time    TROPONINI 0.52 (HH) 07/18/2022 10:29 PM    LABA1C 6.0 07/03/2022 08:26 PM      No results found for: CBCAUTODIF    EKG Interpretation: Sinus rhythm, HR 98, no axis deviation, new T wave inversion V2-5. Echo: None    Stress Test: 2006 No ischemia noted.      Cath:    ANGIOGRAM/CORONARY ARTERIOGRAM:        The left circumflex artery has mild disease              Om1 99%        INTERVENTION  Guide catheter: XB 3.5 Guide  Runthrough wire used to cross OM1 lesion  Predilation with 3.0 regular balllon  IVUS passed to distal OM, reference diameter 3 mm  Haily 3.0 X 15 mm KIRILL  Post dilation with 3.0 NC balloon to 15 ALEXEI      SUMMARY:   Single vessel CAD  S/p successful IVUS guided PCI OM1 X 1 KIRILL     All above diagnostic testing and laboratory data was independently visualized and reviewed by me (not simply review of report)       Assessment and Plan   1) NSTEMI  -s/p PCI Lcx  - restarted on aspirin   - restart effient tomorrow   - discussed with ICU team and ENT     2) epistaxis  - disucssed with Dr. Justo Retana, cleared to restart antiplatelets       Audra Mccormick MD [address] [facility], Interventional Cardiology, and Peripheral Vascular [address] [facility]   (O): [phone]  (F): [phone]

## 2022-08-10 NOTE — PROGRESS NOTES
Occupational Therapy  Facility/Department: [facility]  Occupational Therapy Initial Assessment    Name: Siva Alfredo  : 1945  MRN: [id number]  Date of Service: 8/10/2022    Discharge Recommendations:  3-5 sessions per week, Patient would benefit from continued therapy after discharge  OT Equipment Recommendations  Other: defer to [address] [place] [address] Drive scored a 15/24 on the AM-PAC ADL Inpatient form. Current research shows that an AM-PAC score of 17 or less is typically not associated with a discharge to the patient's home setting. Based on the patient's AM-PAC score and their current ADL deficits, it is recommended that the patient have 3-5 sessions per week of Occupational Therapy at d/c to increase the patient's independence. Please see assessment section for further patient specific details. If patient discharges prior to next session this note will serve as a discharge summary. Please see below for the latest assessment towards goals. Patient Diagnosis(es): The primary encounter diagnosis was NSTEMI (non-ST elevated myocardial infarction) ([facility] Diagnoses of Pneumonia of right lung due to infectious organism, unspecified part of lung, Hypoxia, and Abscess of upper lobe of right lung with pneumonia [facility]) were also pertinent to this visit. Past Medical History:  has a past medical history of CAD (coronary artery disease), Chronic pain syndrome, Colorectal polyps, COPD (chronic obstructive pulmonary disease) (HCC), CTS (carpal tunnel syndrome), DDD (degenerative disc disease), lumbar, Depression, Family hx of colon cancer, Fibromyalgia, Hyperlipemia, IBS (irritable bowel syndrome), Lumbar spondylosis, New onset type 2 diabetes mellitus ([facility] [address] [facility] On home O2, Pneumonia, and Urticaria, chronic. Past Surgical History:  has a past surgical history that includes Tympanostomy tube placement; Cervical polyp removal (2004); Carpal tunnel release (Bilateral); Tubal ligation;  Intracapsular cataract extraction (Left, 06/23/2020); Intracapsular cataract extraction (Right, 07/14/2020); Coronary angioplasty with stent (07/19/2022); Cataract extraction; and bronchoscopy (N/A, 8/3/2022). Assessment   Performance deficits / Impairments: Decreased functional mobility ; Decreased ADL status; Decreased ROM; Decreased strength;Decreased endurance;Decreased balance;Decreased high-level IADLs  Assessment: Pt is a 78 y.o. female admitted from SNF with Acute on chronic respiratory failure with hypoxia and hypercapnia, NSTEMI, and necrotizing PNA. Pt recent admit with COPD Exac, ROLY, UTI and Sepsis and d/c'd on 7/13/2022 to Home at [facility] for rehab. At baseline, pt lives with significant other in apartment setting and independent ADLs, IADLs, and fxl mobility. Pt currently functioning below baseline d/t the above deficits. Today, pt required min A for transfers to stedy and use of stedy to move bed > chair d/t decreased endurance. Anticipate pt would require overall min/mod A for ADLs. Pt easily fatigued with minimal activity and requires rest breaks. Pt demonstrates need for ongoing skilled OT at d/c to maximize pt's safety and independence prior to return home. Prognosis: Fair  REQUIRES OT FOLLOW-UP: Yes  Activity Tolerance  Activity Tolerance: Patient limited by fatigue  Activity Tolerance Comments: decreased endurance; pt on NRB, desat to 70s briefly with standing but quickly recovered to above 90% once sitting. BP supine  87/57 (67). BP EOB  121/60 (74)        Plan   Plan  Times per Week: 3-5  Current Treatment Recommendations: Strengthening, ROM, Balance training, Functional mobility training, Endurance training, Safety education & training, Self-Care / ADL     Restrictions  Restrictions/Precautions  Restrictions/Precautions: Fall Risk  Position Activity Restriction  Other position/activity restrictions: O2 via NRB. (No supplemental oxygen by nasal cannula per ENT note 8/7);     Subjective General  Chart Reviewed: Yes  Patient assessed for rehabilitation services?: Yes  Additional Pertinent Hx: Per Dr. Johanna Mast H&P: \"Pt is an 78y.o. year-old female with a history of hyperlipidemia, fibromyalgia, chronic pain syndrome, severe COPD with chronic hypoxic respiratory failure requiring oxygen at 4 L/min via nasal cannula continuously. She presents to the emergency room for evaluation following a 2 to 3-day history of worsening shortness of breath. She was recently treated for Pseudomonas pneumonia and discharged to rehab. EMS reports that she was on a very long oxygen tube when they arrived. She was placed on 6 L nasal cannula and her oxygen saturation improved. In the emergency room she was found to have an abnormal EKG with inverted T waves in V2 through V5. Cardiology was consulted and asked that she be put on a heparin drip. Patient denies any current or recent chest pain. She is being admitted for further evaluation and treatment. \" Pt s/p LHC on 7/19. 7/27 Pt with rapid response called and transferred to ICU d/t respiratory distress and placed on Bipap. Pt transferred out of ICU 7/28. 8/3 transferred to ICU again d/t hemoptysis. 8/3-8/4/2022 Pt Intubated  Family / Caregiver Present: No  Referring Practitioner: Dr. Sinhg Le  Diagnosis: Acute on chronic respiratory failure with hypoxia and hypercapnia, NSTEMI, Necrotizing pneumonia  Subjective  Subjective: Pt seen bedside and agreeable to therapy.   General Comment  Comments: Per RN ok for therapy     Social/Functional History  Social/Functional History  Lives With: Significant other (Ray)  Type of Home: Apartment (1st floor.)  Home Layout: One level (laundry in apt)  Home Access: Level entry  Bathroom Shower/Tub: Tub/Shower unit  Bathroom Toilet: Standard  Bathroom Equipment: Shower chair  Bathroom Accessibility: Accessible  Home Equipment: Cane  ADL Assistance: [address]: Independent (Shared with sign other.)  Ambulation Assistance: Independent (Without assist device.)  Transfer Assistance: Independent  Active : No (Sign other drives.)  Patient's  Info: significant other drives  Occupation: Retired  Additional Comments: Pt recent admit 7/3/2022 with COPD Exac, ROLY, UTI and Sepsis and d/c'd on 7/13/2022 to Home at [facility] setting. Objective     Safety Devices  Type of Devices: Call light within reach;Nurse notified; Left in chair        AROM: Within functional limits  Strength: Generally decreased, functional  Coordination: Within functional limits           Bed mobility  Supine to Sit: Minimal assistance (HOB elevated, use of bed rail)  Sit to Supine:  (pt in chair at end of session)    Transfers  Sit to stand: Minimal assistance  Stand to sit: Minimal assistance  Transfer Comments: Pt stood from bed > stedy with min A. Use of stedy to transfer bed > chair. Pt then stood chair > stedy with min A. Pt stood ~ 1 min with CGA/min A for balance- able to initiate taking steps in place in prep for ambulation. Cognition  Overall Cognitive Status: WFL  Orientation  Overall Orientation Status:  (somewhat confused to recent events/situation)  Orientation Level: Oriented to person;Oriented to place;Oriented to time                  Education Given To: Patient  Education Provided: Role of Therapy;Plan of Care;Transfer Training;ADL Adaptive Strategies; Energy Conservation  Education Method: Verbal;Demonstration  Barriers to Learning: None  Education Outcome: Verbalized understanding;Demonstrated understanding       AM-PAC Score        AM-PAC Inpatient Daily Activity Raw Score: 15 (08/10/22 0940)  AM-PAC Inpatient ADL T-Scale Score : 34.69 (08/10/22 0940)  ADL Inpatient CMS 0-100% Score: 56.46 (08/10/22 0940)  ADL Inpatient CMS G-Code Modifier : CK (08/10/22 0940)    Goals  Short Term Goals  Time Frame for Short term goals: Prior to d/c: goals ongoing  Short Term Goal 1: Pt will bathe with SBA.  Short Term Goal 2: Pt will dress with SBA. Short Term Goal 3: Pt will toilet with SBA/supervision. Short Term Goal 4: Pt will complete fxl mobility and fxl transfers to/from ADL surfaces with SBA/supervision using AD. Short Term Goal 5: Pt will tolerate standing >5 minutes for functional task with SBA/supervision while maintaining O2 sats >90% to improve standing tolerance for ADL routine. Long Term Goals  Time Frame for Long term goals : STGs=LTGs  Patient Goals   Patient goals : to finish rehab at eventually return home. Therapy Time   Individual Concurrent Group Co-treatment   Time In 0840         Time Out 0910         Minutes 30         Timed Code Treatment Minutes: 30 Minutes     This note to serve as OT d/c summary if pt is d/c-ed prior to next therapy session.     Yoselyn Mckenzie, OTR/L

## 2022-08-10 NOTE — CARE COORDINATION
Rec'd order for LTAC  Call placed to Jennifer Marrufo at [address] [place] [address] Dennys [phone] to request call back to determine if LTACH is appropriate for this patient.   Jeronimo Peterson     Case Management   [phone]    8/10/2022  8:57 AM

## 2022-08-10 NOTE — PROGRESS NOTES
Speech Language Pathology  [facility]   Speech Therapy  Daily Dysphagia Treatment Note    Ericka Jernigan  AGE: 78 y.o.    GENDER: female  : 1945  [id number]  EPISODE DATE:  2022    Patient Active Problem List   Diagnosis    Osteopenia-last dexa  repeat  (-2.4 hip)--advised tx    Colon polyp, hyperplastic,-(done 3/11-repeat 3-5 yrs--dr Ben Venegas)    Family history of colon cancer    CTS (carpal tunnel syndrome)BILATERAL-s/p surgical repair--resolved     Postmenopausal status-last mammogram 2/15 wnl last pap --wnl    Nondependent alcohol abuse, in remission    Urticaria, chronic    Smoking    Chronic back pain-(djd) saw dr Norbert Johns afford surgery--seeing dr Kalie Dominguez for pain meds    Fibromyalgia    Hypercholesteremia    Lumbar spondylosis    Vitamin D deficiency--severe--started 50,000 iu 2x/ wk     Insomnia--on elevil w help    Constipation--on daily miralax    Depression    Chronic pain syndrome    Muscle cramping    Acute on chronic respiratory failure with hypercapnia ([address]    ROLY (acute kidney injury) ([address]    Severe sepsis (HCC)    Gastroesophageal reflux disease    COPD, severe ([address]    Chronic hypoxemic respiratory failure ([address]    Degeneration of lumbar or lumbosacral intervertebral disc    Trochanteric bursitis of right hip    COPD exacerbation ([address]    Diabetes ([address]    Controlled type 2 diabetes mellitus without complication, without long-term current use of insulin ([address]    Age-related osteoporosis without current pathological fracture- started alendronate 2021    Pulmonary nodule seen on imaging study    Pneumonia of right lung due to infectious organism    General weakness    Elevated lactic acid level    Community acquired pneumonia of right lung    Chronic obstructive pulmonary disease ([address]    Acute respiratory failure with hypoxia (HCC)    Acute on chronic respiratory failure with hypoxia and hypercapnia (HCC)    Abnormal EKG    NSTEMI (non-ST elevated myocardial infarction) ([facility]    Necrotizing pneumonia (HCC)    Multifocal pneumonia    Pseudomonas respiratory infection    Abnormal CT of the chest    Peripherally inserted central catheter (PICC) in place    Massive hemoptysis    Epistaxis    Acute respiratory insufficiency    Acute blood loss anemia    Hypotension due to drugs     No Known Allergies    Treatment Diagnosis: Dysphagia     CHART REVIEW:  7/18/2022 admitted with c/o SOB and increased O2 demand  MD ADMISSION H&P HPI: Pt is an 78y.o. year-old female with a history of hyperlipidemia, fibromyalgia, chronic pain syndrome, severe COPD with chronic hypoxic respiratory failure requiring oxygen at 4 L/min via nasal cannula continuously. She presents to the emergency room for evaluation following a 2 to 3-day history of worsening shortness of breath. She was recently treated for Pseudomonas pneumonia and discharged to rehab. EMS reports that she was on a very long oxygen tube when they arrived. She was placed on 6 L nasal cannula and her oxygen saturation improved. In the emergency room she was found to have an abnormal EKG with inverted T waves in V2 through V5. Cardiology was consulted and asked that she be put on a heparin drip. Patient denies any current or recent chest pain. She is being admitted for further evaluation and treatment. Associated signs and symptoms do not include chest pain, lightheaded, dizziness, diaphoresis, edema, orthopnea, paroxysmal nocturnal dyspnea, fever or chills. No recent medication changes    7/27/2022 rapid response: txfer to ICU , placed on BiPAP. PNA progressive and persistent, with cavitary/necrotizing component, R side    8/3/2022: txfer back to ICU, hemoptysis actually found to be massive epistaxis. Intubated. Pulmonology and ENT consulted, rhino rocket placed. 8/4/2022: extubated    IMAGING:  CT CHEST: 7/23/2022  Impression   1.  Mixed consolidative and ground-glass opacities as well as interstitial   thickening throughout the majority of the right lung compatible with   pneumonia. 2. There is a cavity in the right upper lobe demonstrating an air-fluid level   measuring up to 9.7 cm concerning for abscess formation. 3. Trace right pleural effusion. 4. Prominent and enlarged mediastinal lymph nodes, likely reactive. 5. Stable 6 mm left lower lobe pulmonary nodule. Most recent CXR: 8/7/2022  Impression   Increasing opacity throughout portions of the right lung, which can reflect   combination of airspace disease, atelectasis, and pleural effusion. MBS: 8/9/2022  Moderate oral stage dysphagia characterized by decreased mastication and decreased lingual manipulation. Prolonged bolus formation and movement with all. Concern for excess labor/effort with regular solids. Patient maintains good oral control without premature loss to the pharynx. Mild pharyngeal stage dysphagia characterized by delayed swallow, decreased laryngeal elevation, and decreased pharyngeal peristalsis. No penetration/aspiration. No significant residue. Of note, patient met in fluoroscopy suite with reported increased O2 demand prior to MBS. Proceeded with MBS d/t med team concern for potential for silent aspiration. No penetration/aspiration but patient does appear at high risk for compromised/diminished resp status with exertion with PO; recommend PO only as tolerated with frequent rest breaks PRN. Subjective:     Current Diet Level:  Dysphagia III Soft and bite sized ;  Thin liquids      Comments regarding tolerating Current Diet:   RN reports adequate tolerance  Continues with NRB in place      Objective:     Pain: Did not state               Vision: [x]WFL []Impaired:  Hearing: [x]WFL []Impaired:     Cognitive/behavioral/communication:   Oriented to [x] self [x] place [x] date [x] situation  [x]alert []lethargic  [x]cooperative []self-limiting   []confused   []distractible []agitated []impulsive  [x]verbally responsive []nonverbal []limited verbal responses  [x]follows one step commands []does not follow dx []follows complex commands  []aphasic []dysarthric  [] other:     Respiratory Status: []Room Air []O2 via nasal cannula [x]Other: NRB  Dentition: []Adequate [x]Dentures []Missing Many Teeth []Edentulous []Other:    Patient Positioning: Upright in chair    PO Trials: Thin Liquids: suspect premature bolus loss to the pharynx; delayed swallow; decreased laryngeal elevation; NO overt signs/symptoms of penetration/aspiration  Nectar thick liquids: DNT  Honey Thick liquids: DNT: DNT  Puree: delayed swallow; decreased laryngeal elevation; NO overt signs/symptoms of penetration/aspiration   Soft food: prolonged but adequate bolus formation and movement; suspect premature bolus loss to the pharynx; delayed swallow; decreased laryngeal elevation; NO overt signs/symptoms of penetration/aspiration  Regular food: DNT    Dysphagia Tx:   Direct Dysphagia tx: tolerating current diet without overt signs/symptoms; appears to tolerate soft/thin without excess labor and without overt signs/symptoms; improving mastication but concern for reduced mandibular ROM with O2 mask in place - rec easy to chew  Dysphagia ex: NA  Training in compensatory strategies: independent  Pt response to ex/training: agreeable and cooperative; good insight/recall for new dysphagia learning    Goals: The patient will tolerate recommended diet without observed clinical signs of aspiration; continue  The patient/caregiver will demonstrate understanding of compensatory strategies for improved swallowing safety. ; continue  The patient will tolerate soft and bite sized foods 10/10.; met - advance goal to regular  The patient will improve oral motor function via therapeutic oral motor exercises to 5/5 each trial.; not addressed - resolving needs  The patient will improve oral preparation phase via bolus control/manipulation exercises to 5/5 each trial.  not addressed - resolving needs    Assessment:   Impressions:   Pt alert, pleasant and cooperative; improving endurance and decreasing generalized weakness. Pt is oriented x4. She follows commands and is verbally responsive   Oral and pharyngeal dysphagia characterized by prolonged but adequate bolus formation and movement with soft solids d/t decreased ROM for mastication and decreased lingual manipulation; prolonged oral transit; suspect premature bolus loss to the pharynx; delayed swallow initiation; and decreased laryngeal elevation. NO overt signs/symptoms of penetration/aspiration this date. Recommend consideration for easy to chew diet with thin liquids  ST to continue to follow. Recommended Diet and Intervention 8/10/2022:  Solids: IDDSI 7 Regular- Easy To Chew  Liquids: IDDSI 0 Thin Liquids   Meds: Meds PO as tolerated    Compensatory Swallowing Strategies:  Upright as possible with all PO intake , Small bites/sips , Remain upright 30-45 min     EDUCATION:   Provided education regarding role of SLP, results of assessment, recommendations and general speech pathology plan of care. [x] Pt verbalized understanding and agreement   [] Pt requires ongoing learning   [] No evidence of comprehension     Dysphagia Prognosis: [x] good []fair []poor []Guarded        Plan:     Continue Dysphagia Therapy: YES    Interventions: Bolus Control Exercise, Diet Tolerance Monitoring , Patient/Family Education , Therapeutic Trials with SLP   Duration/Frequency of therapy while on unit: Speech therapy for dysphagia tx 3-5 times per week during acute care stay. Discharge Instructions:   Recommend ongoing SLP for dysphagia therapy upon discharge from hospital     This note serves as a D/C Summary in the event that this patient is discharged prior to the next therapy session.     Coded treatment time: 0  Total treatment time: 25    Electronically signed by MICAH Pelletier on 8/10/2022 at 4:53 PM

## 2022-08-11 ENCOUNTER — APPOINTMENT (OUTPATIENT)
Dept: CT IMAGING | Age: 77
DRG: 246 | End: 2022-08-11
Payer: MEDICARE

## 2022-08-11 LAB
ANION GAP SERPL CALCULATED.3IONS-SCNC: 8 MMOL/L (ref 3–16)
BASOPHILS ABSOLUTE: 0 K/UL (ref 0–0.2)
BASOPHILS RELATIVE PERCENT: 0.1 %
BUN BLDV-MCNC: 13 MG/DL (ref 7–20)
CALCIUM SERPL-MCNC: 9.3 MG/DL (ref 8.3–10.6)
CHLORIDE BLD-SCNC: 93 MMOL/L (ref 99–110)
CO2: 33 MMOL/L (ref 21–32)
CREAT SERPL-MCNC: <0.5 MG/DL (ref 0.6–1.2)
EOSINOPHILS ABSOLUTE: 0 K/UL (ref 0–0.6)
EOSINOPHILS RELATIVE PERCENT: 0 %
GFR AFRICAN AMERICAN: >60
GFR NON-AFRICAN AMERICAN: >60
GLUCOSE BLD-MCNC: 132 MG/DL (ref 70–99)
HCT VFR BLD CALC: 26 % (ref 36–48)
HEMOGLOBIN: 8.9 G/DL (ref 12–16)
LYMPHOCYTES ABSOLUTE: 1.3 K/UL (ref 1–5.1)
LYMPHOCYTES RELATIVE PERCENT: 13.5 %
MAGNESIUM: 2.3 MG/DL (ref 1.8–2.4)
MCH RBC QN AUTO: 30.5 PG (ref 26–34)
MCHC RBC AUTO-ENTMCNC: 34.4 G/DL (ref 31–36)
MCV RBC AUTO: 88.6 FL (ref 80–100)
MONOCYTES ABSOLUTE: 0.5 K/UL (ref 0–1.3)
MONOCYTES RELATIVE PERCENT: 5.4 %
NEUTROPHILS ABSOLUTE: 7.9 K/UL (ref 1.7–7.7)
NEUTROPHILS RELATIVE PERCENT: 81 %
PDW BLD-RTO: 14.9 % (ref 12.4–15.4)
PHOSPHORUS: 4 MG/DL (ref 2.5–4.9)
PLATELET # BLD: 599 K/UL (ref 135–450)
PMV BLD AUTO: 6.8 FL (ref 5–10.5)
POTASSIUM REFLEX MAGNESIUM: 4.8 MMOL/L (ref 3.5–5.1)
RBC # BLD: 2.94 M/UL (ref 4–5.2)
SODIUM BLD-SCNC: 134 MMOL/L (ref 136–145)
WBC # BLD: 9.8 K/UL (ref 4–11)

## 2022-08-11 PROCEDURE — 99232 SBSQ HOSP IP/OBS MODERATE 35: CPT | Performed by: NURSE PRACTITIONER

## 2022-08-11 PROCEDURE — 6360000002 HC RX W HCPCS: Performed by: INTERNAL MEDICINE

## 2022-08-11 PROCEDURE — 97530 THERAPEUTIC ACTIVITIES: CPT

## 2022-08-11 PROCEDURE — 85025 COMPLETE CBC W/AUTO DIFF WBC: CPT

## 2022-08-11 PROCEDURE — 2580000003 HC RX 258: Performed by: INTERNAL MEDICINE

## 2022-08-11 PROCEDURE — 2500000003 HC RX 250 WO HCPCS: Performed by: STUDENT IN AN ORGANIZED HEALTH CARE EDUCATION/TRAINING PROGRAM

## 2022-08-11 PROCEDURE — 6370000000 HC RX 637 (ALT 250 FOR IP): Performed by: INTERNAL MEDICINE

## 2022-08-11 PROCEDURE — 94669 MECHANICAL CHEST WALL OSCILL: CPT

## 2022-08-11 PROCEDURE — 36415 COLL VENOUS BLD VENIPUNCTURE: CPT

## 2022-08-11 PROCEDURE — 6370000000 HC RX 637 (ALT 250 FOR IP): Performed by: NURSE PRACTITIONER

## 2022-08-11 PROCEDURE — 6370000000 HC RX 637 (ALT 250 FOR IP): Performed by: STUDENT IN AN ORGANIZED HEALTH CARE EDUCATION/TRAINING PROGRAM

## 2022-08-11 PROCEDURE — APPNB15 APP NON BILLABLE TIME 0-15 MINS: Performed by: NURSE PRACTITIONER

## 2022-08-11 PROCEDURE — 97110 THERAPEUTIC EXERCISES: CPT

## 2022-08-11 PROCEDURE — 92526 ORAL FUNCTION THERAPY: CPT

## 2022-08-11 PROCEDURE — 94640 AIRWAY INHALATION TREATMENT: CPT

## 2022-08-11 PROCEDURE — 84100 ASSAY OF PHOSPHORUS: CPT

## 2022-08-11 PROCEDURE — 99291 CRITICAL CARE FIRST HOUR: CPT | Performed by: INTERNAL MEDICINE

## 2022-08-11 PROCEDURE — 80048 BASIC METABOLIC PNL TOTAL CA: CPT

## 2022-08-11 PROCEDURE — 83735 ASSAY OF MAGNESIUM: CPT

## 2022-08-11 PROCEDURE — 2700000000 HC OXYGEN THERAPY PER DAY

## 2022-08-11 PROCEDURE — 2580000003 HC RX 258: Performed by: STUDENT IN AN ORGANIZED HEALTH CARE EDUCATION/TRAINING PROGRAM

## 2022-08-11 PROCEDURE — 6360000002 HC RX W HCPCS: Performed by: STUDENT IN AN ORGANIZED HEALTH CARE EDUCATION/TRAINING PROGRAM

## 2022-08-11 PROCEDURE — 2000000000 HC ICU R&B

## 2022-08-11 PROCEDURE — 71250 CT THORAX DX C-: CPT

## 2022-08-11 PROCEDURE — 99233 SBSQ HOSP IP/OBS HIGH 50: CPT | Performed by: INTERNAL MEDICINE

## 2022-08-11 PROCEDURE — 94761 N-INVAS EAR/PLS OXIMETRY MLT: CPT

## 2022-08-11 RX ORDER — CALCIUM CARBONATE 200(500)MG
500 TABLET,CHEWABLE ORAL 3 TIMES DAILY PRN
Status: DISCONTINUED | OUTPATIENT
Start: 2022-08-11 | End: 2022-08-16 | Stop reason: HOSPADM

## 2022-08-11 RX ORDER — METOPROLOL SUCCINATE 25 MG/1
12.5 TABLET, EXTENDED RELEASE ORAL DAILY
Status: DISCONTINUED | OUTPATIENT
Start: 2022-08-11 | End: 2022-08-16 | Stop reason: HOSPADM

## 2022-08-11 RX ORDER — SODIUM CHLORIDE 9 MG/ML
INJECTION, SOLUTION INTRAVENOUS PRN
Status: DISCONTINUED | OUTPATIENT
Start: 2022-08-11 | End: 2022-08-16 | Stop reason: HOSPADM

## 2022-08-11 RX ADMIN — ALTEPLASE 1 MG: 2.2 INJECTION, POWDER, LYOPHILIZED, FOR SOLUTION INTRAVENOUS at 06:43

## 2022-08-11 RX ADMIN — ASPIRIN 81 MG: 81 TABLET, CHEWABLE ORAL at 08:31

## 2022-08-11 RX ADMIN — GABAPENTIN 600 MG: 300 CAPSULE ORAL at 19:25

## 2022-08-11 RX ADMIN — NYSTATIN 500000 UNITS: 100000 SUSPENSION ORAL at 13:46

## 2022-08-11 RX ADMIN — SODIUM CHLORIDE SOLN NEBU 3% 15 ML: 3 NEBU SOLN at 19:53

## 2022-08-11 RX ADMIN — IPRATROPIUM BROMIDE AND ALBUTEROL SULFATE 1 AMPULE: 2.5; .5 SOLUTION RESPIRATORY (INHALATION) at 07:59

## 2022-08-11 RX ADMIN — IPRATROPIUM BROMIDE AND ALBUTEROL SULFATE 1 AMPULE: 2.5; .5 SOLUTION RESPIRATORY (INHALATION) at 19:53

## 2022-08-11 RX ADMIN — IPRATROPIUM BROMIDE AND ALBUTEROL SULFATE 1 AMPULE: 2.5; .5 SOLUTION RESPIRATORY (INHALATION) at 15:35

## 2022-08-11 RX ADMIN — TOBRAMYCIN 300 MG: 300 SOLUTION RESPIRATORY (INHALATION) at 07:59

## 2022-08-11 RX ADMIN — SODIUM CHLORIDE, PRESERVATIVE FREE 10 ML: 5 INJECTION INTRAVENOUS at 08:32

## 2022-08-11 RX ADMIN — CLOPIDOGREL BISULFATE 75 MG: 75 TABLET ORAL at 08:31

## 2022-08-11 RX ADMIN — PANTOPRAZOLE SODIUM 40 MG: 40 TABLET, DELAYED RELEASE ORAL at 06:47

## 2022-08-11 RX ADMIN — DULOXETINE HYDROCHLORIDE 60 MG: 60 CAPSULE, DELAYED RELEASE ORAL at 08:31

## 2022-08-11 RX ADMIN — MEROPENEM 1000 MG: 1 INJECTION, POWDER, FOR SOLUTION INTRAVENOUS at 22:00

## 2022-08-11 RX ADMIN — DULOXETINE HYDROCHLORIDE 60 MG: 60 CAPSULE, DELAYED RELEASE ORAL at 19:25

## 2022-08-11 RX ADMIN — MOMETASONE FUROATE AND FORMOTEROL FUMARATE DIHYDRATE 2 PUFF: 200; 5 AEROSOL RESPIRATORY (INHALATION) at 07:59

## 2022-08-11 RX ADMIN — NYSTATIN 500000 UNITS: 100000 SUSPENSION ORAL at 18:23

## 2022-08-11 RX ADMIN — ALTEPLASE 1 MG: 2.2 INJECTION, POWDER, LYOPHILIZED, FOR SOLUTION INTRAVENOUS at 06:42

## 2022-08-11 RX ADMIN — METOPROLOL SUCCINATE 12.5 MG: 25 TABLET, EXTENDED RELEASE ORAL at 11:09

## 2022-08-11 RX ADMIN — Medication 5 MCG/MIN: at 23:19

## 2022-08-11 RX ADMIN — NYSTATIN 500000 UNITS: 100000 SUSPENSION ORAL at 08:31

## 2022-08-11 RX ADMIN — MOMETASONE FUROATE AND FORMOTEROL FUMARATE DIHYDRATE 2 PUFF: 200; 5 AEROSOL RESPIRATORY (INHALATION) at 19:55

## 2022-08-11 RX ADMIN — TIZANIDINE 4 MG: 4 TABLET ORAL at 19:25

## 2022-08-11 RX ADMIN — SODIUM CHLORIDE SOLN NEBU 3% 15 ML: 3 NEBU SOLN at 07:59

## 2022-08-11 RX ADMIN — SODIUM CHLORIDE: 9 INJECTION, SOLUTION INTRAVENOUS at 05:18

## 2022-08-11 RX ADMIN — TOBRAMYCIN 300 MG: 300 SOLUTION RESPIRATORY (INHALATION) at 20:02

## 2022-08-11 RX ADMIN — FLUCONAZOLE 100 MG: 100 TABLET ORAL at 08:31

## 2022-08-11 RX ADMIN — METHYLPREDNISOLONE SODIUM SUCCINATE 40 MG: 40 INJECTION, POWDER, FOR SOLUTION INTRAMUSCULAR; INTRAVENOUS at 08:31

## 2022-08-11 RX ADMIN — GABAPENTIN 600 MG: 300 CAPSULE ORAL at 08:31

## 2022-08-11 RX ADMIN — MEROPENEM 1000 MG: 1 INJECTION, POWDER, FOR SOLUTION INTRAVENOUS at 05:20

## 2022-08-11 RX ADMIN — SODIUM CHLORIDE, PRESERVATIVE FREE 10 ML: 5 INJECTION INTRAVENOUS at 19:25

## 2022-08-11 RX ADMIN — NYSTATIN 500000 UNITS: 100000 SUSPENSION ORAL at 19:25

## 2022-08-11 RX ADMIN — MEROPENEM 1000 MG: 1 INJECTION, POWDER, FOR SOLUTION INTRAVENOUS at 13:47

## 2022-08-11 RX ADMIN — ATORVASTATIN CALCIUM 40 MG: 40 TABLET, FILM COATED ORAL at 19:25

## 2022-08-11 RX ADMIN — ANTACID TABLETS 500 MG: 500 TABLET, CHEWABLE ORAL at 13:46

## 2022-08-11 ASSESSMENT — PAIN SCALES - GENERAL
PAINLEVEL_OUTOF10: 0

## 2022-08-11 NOTE — PROGRESS NOTES
Pulmonary Critical Care Progress Note     Patient's name:  Kailyn Gaona  Medical Record Number: [id number]  Patient's account/billing number: [de-identified]  Patient's YOB: 1945  Age: 78 y.o. Date of Admission: 7/18/2022  5:03 PM  Date of Consult: 8/11/2022      Primary Care Physician: HECTOR Spencer CNP      Code Status: Limited    Chief complaint: acute respiratory failure with hypoxia    Assessment and Plan     Acute respiratory failure with hypoxia  Necrotizing pseudomonas PNA  Massive Epistaxis s/p Rhinorocket removed  Moderately Severe copd     Plan:  Nebulized duoneb QID and hypertonic saline  Dulera  Acapella  O2 to keep sat > 90%  On ASA and plavix  Steroid x 5 days    Antibiotics per ID, meropenem and Arnie        Overnight:  Sob  Cough  Diff clearing secretions  On 11 L    REVIEW OF SYSTEMS:  Review of Systems -   General ROS: negative  Psychological ROS: negative  Ophthalmic ROS: negative  ENT ROS: mild right epistaxis   Allergy and Immunology ROS: negative  Hematological and Lymphatic ROS: negative  Endocrine ROS: negative  Breast ROS: negative  Respiratory ROS: cough and sob  Cardiovascular ROS: no chest pain or dyspnea on exertion  Gastrointestinal ROS:negative  Genito-Urinary ROS: negative  Musculoskeletal ROS: negative  Neurological ROS: negative  Dermatological ROS: negative        Physical Exam:    Vitals: BP 97/63   Pulse (!) 106   Temp 98 °F (36.7 °C) (Axillary)   Resp 24   Ht 5' 4\" (1.626 m)   Wt 129 lb 10.1 oz (58.8 kg)   SpO2 96%   BMI 22.25 kg/m²     Last Body weight:   Wt Readings from Last 3 Encounters:   08/11/22 129 lb 10.1 oz (58.8 kg)   07/13/22 133 lb 13.1 oz (60.7 kg)   06/20/22 138 lb (62.6 kg)       Body Mass Index : Body mass index is 22.25 kg/m².       Intake and Output summary:   Intake/Output Summary (Last 24 hours) at 8/11/2022 1141  Last data filed at 8/11/2022 0517  Gross per 24 hour   Intake 809.53 ml   Output 1720 ml   Net -910.47 ml       Physical Examination:     Gen:  acutely ill appearing, weak   Eyes: PERRL. Anicteric sclera. No conjunctival injection. ENT: No discharge. Posterior oropharynx clear. External appearance of ears and nose normal.  Neck: Trachea midline. No mass   Resp:  severe rhonchi and diminished on the right, no wheezing, mild increase wob  CV: Regular rate. Regular rhythm. No murmur or rub. No edema. GI: Soft, Non-tender. Non-distended. +BS  Skin: Warm, dry, w/o erythema. Lymph: No cervical or supraclavicular LAD. M/S: No cyanosis. No clubbing. Neuro:  CN 2-12 tested, no focal neurologic deficit. Moves all extremities  Psych: Awake and alert, Oriented x 3. Judgement and insight appropriate. Mood stable. Laboratory findings:-    CBC:   Recent Labs     08/11/22  0756   WBC 9.8   HGB 8.9*   *     BMP:    Recent Labs     08/09/22  0910 08/10/22  0610 08/11/22  0756    138 134*   K 4.5 4.8 4.8   CL 99 97* 93*   CO2 36* 35* 33*   BUN 14 10 13   CREATININE <0.5* <0.5* <0.5*   GLUCOSE 113* 106* 132*     S. Calcium:  Recent Labs     08/11/22  0756   CALCIUM 9.3         Radiology Review:  Pertinent images / reports were reviewed as a part of this visit.   CXR reviewed extensive right airspace disease    CC time 31 min                    Michael Lindquist MD, MDUARTE.            8/11/2022, 11:41 AM

## 2022-08-11 NOTE — PROGRESS NOTES
Physical Therapy  Facility/Department: [address] ICU  Physical Therapy Treatment Note    Name: Nazario Gold  : 1945  MRN: [id number]  Date of Service: 2022    Discharge Recommendations:  Continue to assess pending progress, Subacute/Skilled Nursing Facility   PT Equipment Recommendations  Other: Defer to next level of care. Nazario Gold scored a 14/24 on the AM-PAC short mobility form. Current research shows that an AM-PAC score of 17 or less is typically not associated with a discharge to the patient's home setting. Based on the patient's AM-PAC score and their current functional mobility deficits, it is recommended that the patient have 3-5 sessions per week of Physical Therapy at d/c to increase the patient's independence. Please see assessment section for further patient specific details. If patient discharges prior to next session this note will serve as a discharge summary. Please see below for the latest assessment towards goals. Assessment   Body Structures, Functions, Activity Limitations Requiring Skilled Therapeutic Intervention: Decreased functional mobility ; Decreased strength;Decreased endurance  Assessment: 77 y/o female admit 2022 with Pneumonia, Elevated Troponin. 2022 Cardiac Cath : NSTEMI.  8/3/2022 Pt transfer to ICU with worsening Respiratory Concerns. -2022 Pt Intubated. Recent hospital admit 7/3-2022 (admit from home) and d/c to SNF setting. PMH as noted including COPD, Lumbar Spondlyosis, Fibromyalgia. Pt admit from SNF setting; prior pt living with sign other in apt setting. Pt currently requiring O2 via NRB (set 10L). O2 sats remain 90's at rest; desat briefly 82% during oob with Walker to chair; desat 79% following additional transfer from chair. Recovers quickly with seated rest breaks. Very slow progress given respiratory issues. At this time, would recommend cont PT (3-5) upon d/c.   Therapy Prognosis: Fair  History: 76 y/o female admit 7/18/2022 with Pneumonia, Elevated Troponin. 7/19/2022 Cardiac Cath : NSTEMI.  8/3/2022 Pt transfer to ICU with worsening Respiratory Concerns. 8/l3-8/4/2022 Pt Intubated. Recent hospital admit 7/3-7/13/2022 (admit from home) and d/c to SNF setting. PMH as noted including COPD, Lumbar Spondlyosis, Fibromyalgia. Exam: See above. Clinical Presentation: See above. Barriers to Learning: Endurance. Requires PT Follow-Up: Yes  Activity Tolerance  Activity Tolerance: Patient limited by endurance  Activity Tolerance Comments: Pt currently requiring O2 via NRB (set 10L). O2 sats remain 90's at rest; desat briefly 82% during oob with Walker to chair; desat 79% following additional transfer from chair. Recovers quickly with seated rest breaks. Slow progress. Plan   Plan  Plan: 3-5 times per week  Current Treatment Recommendations: Strengthening, Functional mobility training, Transfer training, Gait training, Endurance training, Safety education & training, Patient/Caregiver education & training  Safety Devices  Type of Devices: Call light within reach, Nurse notified, Left in chair     Restrictions  Restrictions/Precautions  Restrictions/Precautions: Fall Risk  Position Activity Restriction  Other position/activity restrictions: NRB set at 10L;  (No supplemental oxygen by nasal cannula per ENT note 8/7); Subjective   General  Chart Reviewed: Yes  Patient assessed for rehabilitation services?: Yes  Additional Pertinent Hx: 79 y/o female admit 7/18/2022 with Pneumonia, Elevated Troponin. 7/19/2022 Cardiac Cath : NSTEMI.  8/3/2022 Pt transfer to ICU with worsening Respiratory Concerns. 8/l3-8/4/2022 Pt Intubated. Recent hospital admit 7/3-7/13/2022 (admit from home) and d/c to SNF setting. PMH as noted including COPD, Lumbar Spondlyosis, Fibromyalgia. Response To Previous Treatment: Patient with no complaints from previous session.   Family / Caregiver Present: No  Referring Practitioner: EDVIN William NP. Follows Commands: Within Functional Limits  Subjective  Subjective: Pt agreeable to PT Rx. Pt wants to be able to walk, be able to return home. Social/Functional History  Social/Functional History  Lives With: Significant other (Ray)  Type of Home: Apartment (1st floor.)  Home Layout: One level (laundry in apt)  Home Access: Level entry  Bathroom Shower/Tub: Tub/Shower unit  Bathroom Toilet: Standard  Bathroom Equipment: Shower chair  Bathroom Accessibility: Accessible  Home Equipment: Meet Purpura  ADL Assistance: Independent  Homemaking Assistance: Independent (Shared with sign other.)  Ambulation Assistance: Independent (Without assist device.)  Transfer Assistance: Independent  Active : No (Sign other drives.)  Patient's  Info: significant other drives  Occupation: Retired  Additional Comments: Pt recent admit 7/3/2022 with COPD Exac, ROLY, UTI and Sepsis and d/c'd on 7/13/2022 to [facility] at [facility] SNF setting. Vision/Hearing  Vision  Vision: Impaired  Vision Exceptions: Wears glasses for reading  Hearing  Hearing: Within functional limits    Cognition   Orientation  Orientation Level: Oriented to person;Oriented to place;Oriented to time  Cognition  Overall Cognitive Status: WFL     Objective                 Bed mobility  Supine to Sit: Minimal assistance (HOB elevated.)  Transfers  Sit to Stand: Minimal Assistance (With Mike Highman. Cues for safe hand placement.)  Stand to sit: Minimal Assistance (With Mike Highman. Cues for safe hand placement.)  Comment: Additional Transfer from chair with Westly Bravo assist.  Ambulation  Surface: level tile  Device: Rolling Walker  Distance: 4-5 small steps bed to chair with Westly Bravo assist x 2. Slight lob post as initially obtain upright. Additional 5 steps in place with Walker (after brief seated rest break). Very limited endurance; desat quickly with activity; requiring extended seated rest breaks.         A/AROM Exercises: Seated : Hip Flex, Knee Ext, Ankle Pumps. AM-PAC Score  AM-PAC Inpatient Mobility Raw Score : 14 (08/11/22 1049)  AM-PAC Inpatient T-Scale Score : 38.1 (08/11/22 1049)  Mobility Inpatient CMS 0-100% Score: 61.29 (08/11/22 1049)  Mobility Inpatient CMS G-Code Modifier : CL (08/11/22 1049)          Goals  Short Term Goals  Time Frame for Short term goals: Upon d/c acute care setting. Short term goal 1: Bed Mob SBA. Short term goal 2: Transfers with assist device CGA. Short term goal 3: Amb with assist device 25' CGA. Short term goal 4: SpO2 levels to stay >90% with activity  Patient Goals   Patient goals : Get stronger and eventually return home. Education  Patient Education  Education Given To: Patient  Education Provided Comments: Role of PT, POC, Need to call for assist, LE Exs, Safe use of Walker.   Education Method: Verbal;Demonstration  Education Outcome: Continued education needed      Therapy Time   Individual Concurrent Group Co-treatment   Time In 0         Time Out 0800         Minutes 1200 First Veda Li, SANDHYA

## 2022-08-11 NOTE — PROGRESS NOTES
Left upper arm double lumen PICC with sluggish blood return. 1 mg Cathflo instilled in each lumen. Second call to lab to have patients labs drawn peripherally.

## 2022-08-11 NOTE — PROGRESS NOTES
Speech Language Pathology  [facility]   Speech Therapy  Daily Dysphagia Treatment Note    Mitchell Feng  AGE: 78 y.o.    GENDER: female  : 1945  [id number]  EPISODE DATE:  2022    Patient Active Problem List   Diagnosis    Osteopenia-last dexa  repeat  (-2.4 hip)--advised tx    Colon polyp, hyperplastic,-(done 3/11-repeat 3-5 yrs--dr Abdelrahman Al)    Family history of colon cancer    CTS (carpal tunnel syndrome)BILATERAL-s/p surgical repair--resolved     Postmenopausal status-last mammogram 2/15 wnl last pap --wnl    Nondependent alcohol abuse, in remission    Urticaria, chronic    Smoking    Chronic back pain-(djd) saw dr Maureen Elmore afford surgery--seeing dr Laura Pena for pain meds    Fibromyalgia    Hypercholesteremia    Lumbar spondylosis    Vitamin D deficiency--severe--started 50,000 iu 2x/ wk     Insomnia--on elevil w help    Constipation--on daily miralax    Depression    Chronic pain syndrome    Muscle cramping    Acute on chronic respiratory failure with hypercapnia ([address]    ROLY (acute kidney injury) ([address]    Severe sepsis (HCC)    Gastroesophageal reflux disease    COPD, severe ([address]    Chronic hypoxemic respiratory failure ([address]    Degeneration of lumbar or lumbosacral intervertebral disc    Trochanteric bursitis of right hip    COPD exacerbation ([address]    Diabetes ([address]    Controlled type 2 diabetes mellitus without complication, without long-term current use of insulin ([address]    Age-related osteoporosis without current pathological fracture- started alendronate 2021    Pulmonary nodule seen on imaging study    Pneumonia of right lung due to infectious organism    General weakness    Elevated lactic acid level    Community acquired pneumonia of right lung    Chronic obstructive pulmonary disease ([address]    Acute respiratory failure with hypoxia (HCC)    Acute on chronic respiratory failure with hypoxia and hypercapnia (HCC)    Abnormal EKG    NSTEMI (non-ST elevated myocardial infarction) [facility]    Necrotizing pneumonia (HCC)    Abscess of upper lobe of right lung with pneumonia ([facility]    Pseudomonas respiratory infection    Abnormal CT of the chest    Peripherally inserted central catheter (PICC) in place    Massive hemoptysis    Epistaxis    Acute respiratory insufficiency    Acute blood loss anemia    Hypotension due to drugs     No Known Allergies    Treatment Diagnosis: Dysphagia     CHART REVIEW:  7/18/2022 admitted with c/o SOB and increased O2 demand  MD ADMISSION H&P HPI: Pt is an 78y.o. year-old female with a history of hyperlipidemia, fibromyalgia, chronic pain syndrome, severe COPD with chronic hypoxic respiratory failure requiring oxygen at 4 L/min via nasal cannula continuously. She presents to the emergency room for evaluation following a 2 to 3-day history of worsening shortness of breath. She was recently treated for Pseudomonas pneumonia and discharged to rehab. EMS reports that she was on a very long oxygen tube when they arrived. She was placed on 6 L nasal cannula and her oxygen saturation improved. In the emergency room she was found to have an abnormal EKG with inverted T waves in V2 through V5. Cardiology was consulted and asked that she be put on a heparin drip. Patient denies any current or recent chest pain. She is being admitted for further evaluation and treatment. Associated signs and symptoms do not include chest pain, lightheaded, dizziness, diaphoresis, edema, orthopnea, paroxysmal nocturnal dyspnea, fever or chills. No recent medication changes    7/27/2022 rapid response: txfer to ICU , placed on BiPAP. PNA progressive and persistent, with cavitary/necrotizing component, R side    8/3/2022: txfer back to ICU, hemoptysis actually found to be massive epistaxis. Intubated. Pulmonology and ENT consulted, rhino rocket placed. 8/4/2022: extubated    IMAGING:  CT CHEST: 7/23/2022  Impression   1.  Mixed consolidative and ground-glass opacities as well as interstitial   thickening throughout the majority of the right lung compatible with   pneumonia. 2. There is a cavity in the right upper lobe demonstrating an air-fluid level   measuring up to 9.7 cm concerning for abscess formation. 3. Trace right pleural effusion. 4. Prominent and enlarged mediastinal lymph nodes, likely reactive. 5. Stable 6 mm left lower lobe pulmonary nodule. Most recent CXR: 8/7/2022  Impression   Increasing opacity throughout portions of the right lung, which can reflect   combination of airspace disease, atelectasis, and pleural effusion. MBS: 8/9/2022  Moderate oral stage dysphagia characterized by decreased mastication and decreased lingual manipulation. Prolonged bolus formation and movement with all. Concern for excess labor/effort with regular solids. Patient maintains good oral control without premature loss to the pharynx. Mild pharyngeal stage dysphagia characterized by delayed swallow, decreased laryngeal elevation, and decreased pharyngeal peristalsis. No penetration/aspiration. No significant residue. Of note, patient met in fluoroscopy suite with reported increased O2 demand prior to MBS. Proceeded with MBS d/t med team concern for potential for silent aspiration. No penetration/aspiration but patient does appear at high risk for compromised/diminished resp status with exertion with PO; recommend PO only as tolerated with frequent rest breaks PRN. Subjective:     Current Diet Level:  Dysphagia III Soft and bite sized ; Thin liquids      Comments regarding tolerating Current Diet:   RN reports adequate tolerance  Weaned from NRB, on 11 HFNC.    Patient reports limited PO intake previous day due to acid reflux    Objective:   Pain: Did not state               Vision: [x]WFL []Impaired:  Hearing: [x]WFL []Impaired:     Cognitive/behavioral/communication:   Oriented to [x] self [x] place [x] date [x] situation  [x]alert []lethargic  [x]cooperative []self-limiting   []confused   []distractible []agitated []impulsive  [x]verbally responsive []nonverbal []limited verbal responses  [x]follows one step commands []does not follow dx []follows complex commands  []aphasic []dysarthric  [] other:     Respiratory Status: []Room Air [x]O2 via nasal cannula 11 HFNC []Other  Dentition: []Adequate [x]Dentures []Missing Many Teeth []Edentulous []Other:    Patient Positioning: Upright in chair    PO Trials: limited PO intake d/t transport arriving to take patient to CT   Thin Liquids: suspect premature bolus loss to the pharynx; delayed swallow; decreased laryngeal elevation; NO overt signs/symptoms of penetration/aspiration  Nectar thick liquids: DNT  Honey Thick liquids: DNT: DNT  Puree: DNT   Soft food 3 bites: prolonged but adequate bolus formation and movement; suspect premature bolus loss to the pharynx; delayed swallow; decreased laryngeal elevation; NO overt signs/symptoms of penetration/aspiration  Regular food: DNT    Dysphagia Tx:   Direct Dysphagia tx: tolerating current diet without overt signs/symptoms; appears to tolerate soft/thin without excess labor and without overt signs/symptoms; improving mastication unable to assess regular textures due to transport arriving - rec cont easy to chew  Dysphagia ex: NA  Training in compensatory strategies: independent  Pt response to ex/training: agreeable and cooperative; good insight/recall for new dysphagia learning    Goals: The patient will tolerate recommended diet without observed clinical signs of aspiration; continue  The patient/caregiver will demonstrate understanding of compensatory strategies for improved swallowing safety. ; continue  The patient will tolerate soft and bite sized foods 10/10.; met - advance goal to regular  The patient will improve oral motor function via therapeutic oral motor exercises to 5/5 each trial.; not addressed - resolving needs  The patient will improve oral preparation phase via bolus control/manipulation exercises to 5/5 each trial.  not addressed - resolving needs    Assessment:   Impressions:   Pt alert, pleasant and cooperative; improving endurance and decreasing generalized weakness. Pt is oriented x4. She follows commands and is verbally responsive   Oral and pharyngeal dysphagia characterized by prolonged but adequate bolus formation and movement with soft solids d/t decreased ROM for mastication and decreased lingual manipulation; prolonged oral transit; suspect premature bolus loss to the pharynx; delayed swallow initiation; and decreased laryngeal elevation. NO overt signs/symptoms of penetration/aspiration this date. Recommend continue easy to chew diet with thin liquids  ST to continue to follow. Recommended Diet and Intervention 8/11/2022:  Solids: IDDSI 7 Regular- Easy To Chew  Liquids: IDDSI 0 Thin Liquids   Meds: Meds PO as tolerated    Compensatory Swallowing Strategies:  Upright as possible with all PO intake , Small bites/sips , Remain upright 30-45 min     EDUCATION:   Provided education regarding role of SLP, results of assessment, recommendations and general speech pathology plan of care. [x] Pt verbalized understanding and agreement   [] Pt requires ongoing learning   [] No evidence of comprehension     Dysphagia Prognosis: [x] good []fair []poor []Guarded        Plan:     Continue Dysphagia Therapy: YES    Interventions: Bolus Control Exercise, Diet Tolerance Monitoring , Patient/Family Education , Therapeutic Trials with SLP   Duration/Frequency of therapy while on unit: Speech therapy for dysphagia tx 3-5 times per week during acute care stay. Discharge Instructions:   Recommend ongoing SLP for dysphagia therapy upon discharge from hospital     This note serves as a D/C Summary in the event that this patient is discharged prior to the next therapy session.     Coded treatment time: 0  Total treatment time: [address], [place], [address] [place] [address]  Speech-Language Pathologist  On 08/11/22 at 10:15 AM

## 2022-08-11 NOTE — CARE COORDINATION
No returned call from [facility],May 3. Second message left for rep today:  Eloy Lopez [phone]  Called their main line; they took my message. Call placed to Jeralene Brunner with [facility] [phone]. She will review the referral.  Michael Quinn [facility]     Case Management   [phone]    8/11/2022  9:43 AM    Spoke with Eloy Lopez at [address] [place] [address]. Her insurance is out of network for them but could take is they got a denial from [facility] and completed a one time contract.   She will review the referral.  Michael Quinn [place]     Case Management   [phone]    8/11/2022  10:35 AM

## 2022-08-11 NOTE — PROGRESS NOTES
Patient taken off 10lpm NRB and placed on 11 HFNC. Patients nose is no longer packed or bleeding. LIZETH Talbert agreed, will try and maintain NC and wean if able.     Electronically signed by Mitra Hicks on 8/11/2022 at 8:17 AM

## 2022-08-11 NOTE — PROGRESS NOTES
Infectious Disease Follow up Notes  Admit Date: 7/18/2022  Hospital Day: 25    Antibiotics :   IV Meropenem  Inhaled Tobramycin     CHIEF COMPLAINT:      Rt UL lung abscess  Severe Necrotizing PNA  Pseudomonas PNA  Hypoxic resp failure  Epistaxis       Subjective interval History :  78 y. o.woman with a past medical history of COPD, depression, chronic hypoxic respiratory failure now on   4 L of oxygen via nasal cannula, diabetes recent admission for COPD exacerbation and pneumonia diagnosed to have Pseudomonas based on sputum studies. She was on IV cefepime transition to oral levofloxacin on discharge. She was sent to [address] rehab facility and now admitted because of worsening shortness of breath increased sputum production. Sputum culture again on this admission positive for Pseudomonas with some resistance pattern, it is now become resistant to quinolones. CT chest on this admission with progressive pneumonia right upper lobe cavitary lesion possible lung abscess. Extensive consolidation  see images -  This is a new finding on the CT chest compared to her previous CT chest on 6/20/22. Given the necrotizing pneumonia with Pseudomonas infection and lung abscess formation we are consulted for recommendations. Interval History : Remains in ICU , sitting up in the chair no epistaxis ongoing shortness of breath remains on nasal cannula doing modification. Repeat CT chest completed right chest ongoing upper lobe cavitary abscess noted but pneumonia consolidation slowly improving. Past Medical History:    Past Medical History:   Diagnosis Date    CAD (coronary artery disease) 07/19/2022    NSTEMI, PCI LCx    Chronic pain syndrome     Percocet.  Dr. Ayaan Sim    Colorectal polyps     COPD (chronic obstructive pulmonary disease) ([facility] [address]     CTS (carpal tunnel syndrome)     BILATERAL    DDD (degenerative disc disease), lumbar Depression     Family hx of colon cancer     Fibromyalgia     Hyperlipemia     IBS (irritable bowel syndrome)     Lumbar spondylosis     New onset type 2 diabetes mellitus ([facility] [address] 02/03/2020    On home O2     4L    Pneumonia 07/2022    P.aer    Urticaria, chronic        Past Surgical History:    Past Surgical History:   Procedure Laterality Date    BRONCHOSCOPY N/A 8/3/2022    BRONCHOSCOPY performed by Helio Tatum DO at [facility] Bilateral     CATARACT EXTRACTION      CERVICAL POLYP REMOVAL  09/2004    CORONARY ANGIOPLASTY WITH STENT PLACEMENT  07/19/2022    LCx 99. PCI/stent. INTRACAPSULAR CATARACT EXTRACTION Left 06/23/2020    Phacoemulsification with intraocular lens implant performed by Janene Duran MD at [address] Right 07/14/2020    Phacoemulsification with intraocular lens implant performed by Janene Duran MD at [address]      patient denies        Current Medications:    Outpatient Medications Marked as Taking for the 7/18/22 encounter [facility]OS[facility] Encounter)   Medication Sig Dispense Refill    aspirin 81 MG EC tablet Take 1 tablet by mouth in the morning. 30 tablet 3    prasugrel (EFFIENT) 10 MG TABS Take 1 tablet by mouth in the morning. 30 tablet 1       Allergies:  Patient has no known allergies.     Immunizations :   Immunization History   Administered Date(s) Administered    COVID-19, MODERNA BLUE border, Primary or Immunocompromised, (age 12y+), IM, 100 mcg/0.5mL 03/08/2021, 04/05/2021    COVID-19, MODERNA Booster BLUE border, (age 18y+), IM, 50mcg/0.25mL 12/01/2021    Influenza Vaccine, unspecified formulation 01/10/2017    Influenza, High Dose (Fluzone 65 yrs and older) 11/04/2013, 01/21/2016, 09/01/2017, 10/30/2018, 09/18/2020    Influenza, Quadv, adjuvanted, 65 yrs +, IM, PF (Fluad) 09/27/2021    Influenza, Triv, inactivated, subunit, adjuvanted, IM (Fluad 65 yrs and older) 09/13/2019    Pneumococcal Conjugate 13-valent (Stsavaq95) 01/19/2015    Pneumococcal Conjugate Vaccine 01/10/2017    Pneumococcal Polysaccharide ([id number]) 10/12/2012    Tdap (Boostrix, Adacel) 07/17/2007    Zoster Recombinant (Shingrix) 11/21/2019       Social History:     Social History     Tobacco Use    Smoking status: Every Day     Packs/day: 1.00     Years: 55.00     Pack years: 55.00     Types: Cigarettes     Start date: 1/1/1962    Smokeless tobacco: Never    Tobacco comments:     almost ready to quit   Vaping Use    Vaping Use: Never used   Substance Use Topics    Alcohol use: No     Alcohol/week: 0.0 standard drinks    Drug use: No     Social History     Tobacco Use   Smoking Status Every Day    Packs/day: 1.00    Years: 55.00    Pack years: 55.00    Types: Cigarettes    Start date: 1/1/1962   Smokeless Tobacco Never   Tobacco Comments    almost ready to quit      Family History   Problem Relation Age of Onset    Cancer Father         COLON     Alzheimer's Disease Mother     Heart Disease Brother     Heart Failure Brother     Diabetes Daughter     Diabetes Daughter     Diabetes Daughter           REVIEW OF SYSTEMS:      Constitutional:  negative for fevers, chills, night sweats  Eyes:  negative for blurred vision, eye discharge, visual disturbance   HEENT:  negative for hearing loss, ear drainage,nasal congestion  Respiratory:  r cough,+  shortness of breath++ or hemoptysis   Cardiovascular:  negative for chest pain, palpitations, syncope  Gastrointestinal:  negative for nausea, vomiting, diarrhea, constipation, abdominal pain  Genitourinary:  negative for frequency, dysuria, urinary incontinence, hematuria  Hematologic/Lymphatic:  negative for easy bruising, bleeding and lymphadenopathy  Allergic/Immunologic:  negative for recurrent infections, angioedema, anaphylaxis   Endocrine:  negative for weight changes, polyuria, polydipsia and polyphagia  Musculoskeletal:  negative for joint  pain, swelling, decreased range of motion  Neurological:  negative for headaches, slurred speech, unilateral weakness  Psychiatric: negative for hallucinations,confusion,agitation.              PHYSICAL EXAM:      Vitals:    /67   Pulse (!) 107   Temp 97.9 °F (36.6 °C) (Axillary)   Resp 21   Ht 5' 4\" (1.626 m)   Wt 129 lb 10.1 oz (58.8 kg)   SpO2 99%   BMI 22.25 kg/m²     General Appearance: awake and in some  acute distress, ++  pallor on nasal cannula   Skin: warm and dry, no rash or erythema  Head: normocephalic and atraumatic  Eyes: pupils equal, round, and reactive to light, conjunctivae normal  ENT: tympanic membrane, external ear and ear canal normal bilaterally, nose without deformity, nasal mucosa and turbinates normal without polyps  Neck: supple and non-tender without mass, no thyromegaly  no cervical lymphadenopathy  Pulmonary/Chest:  Rt UL coarse crepts+ +  wheezes, rales or rhonchi, normal air movement, in some respiratory distress  Cardiovascular: normal rate, regular rhythm, normal S1 and S2, no murmurs, rubs, clicks, or gallops, no carotid bruits  Abdomen: soft, non-tender, non-distended, normal bowel sounds, no masses or organomegaly  Extremities: no cyanosis, clubbing or edema  Musculoskeletal: normal range of motion, no joint swelling, deformity or tenderness  Integumentary: No rashes, no abnormal skin lesions, no petechiae  Neurologic: reflexes normal and symmetric, no cranial nerve deficit           Lines: PICC     Data Review:    CBC:   Lab Results   Component Value Date    WBC 9.8 08/11/2022    HGB 8.9 (L) 08/11/2022    HCT 26.0 (L) 08/11/2022    MCV 88.6 08/11/2022     (H) 08/11/2022     RENAL:   Lab Results   Component Value Date    CREATININE <0.5 (L) 08/11/2022    BUN 13 08/11/2022     (L) 08/11/2022    K 4.8 08/11/2022    CL 93 (L) 08/11/2022    CO2 33 (H) 08/11/2022     SED RATE: No results found for: SEDRATE  CK: No results found for: CKTOTAL  CRP: No results found for: CRP  Hepatic Function Panel:   Lab Results   Component Value Date/Time    ALKPHOS 88 07/18/2022 05:20 PM    ALT 65 07/18/2022 05:20 PM    AST 61 07/18/2022 05:20 PM    PROT 5.6 07/18/2022 05:20 PM    PROT 6.9 10/12/2012 11:00 AM    BILITOT 0.5 07/18/2022 05:20 PM    LABALBU 2.4 07/18/2022 05:20 PM     UA:  Lab Results   Component Value Date/Time    COLORU Yellow 07/22/2022 06:31 AM    CLARITYU Clear 07/22/2022 06:31 AM    GLUCOSEU Negative 07/22/2022 06:31 AM    BILIRUBINUR Negative 07/22/2022 06:31 AM    BILIRUBINUR neg 10/17/2019 11:50 AM    KETUA TRACE 07/22/2022 06:31 AM    SPECGRAV 1.049 07/22/2022 06:31 AM    BLOODU Negative 07/22/2022 06:31 AM    PHUR 6.5 07/22/2022 06:31 AM    PROTEINU 30 07/22/2022 06:31 AM    UROBILINOGEN 0.2 07/22/2022 06:31 AM    NITRU Negative 07/22/2022 06:31 AM    LEUKOCYTESUR Negative 07/22/2022 06:31 AM    LABMICR YES 07/22/2022 06:31 AM    URINETYPE NotGiven 07/22/2022 06:31 AM      Urine Microscopic:   Lab Results   Component Value Date/Time    LABCAST 10-20 Hyaline 01/10/2017 06:19 PM    BACTERIA None Seen 07/22/2022 06:31 AM    COMU see below 07/03/2022 03:11 PM    HYALCAST 2 07/22/2022 06:31 AM    WBCUA 2 07/22/2022 06:31 AM    RBCUA 5 07/22/2022 06:31 AM    EPIU 1 07/22/2022 06:31 AM     Urine Reflex to Culture:   Lab Results   Component Value Date/Time    URRFLXCULT Not Indicated 07/03/2022 03:11 PM         MICRO: cultures reviewed and updated by me   Blood Culture:          Culture, Respiratory [[id number]] (Abnormal)  Collected: 07/22/22 1200   Order Status: Completed Specimen: Sputum Expectorated Updated: 07/24/22 0801    CULTURE, RESPIRATORY Rare growth normal respiratory fabiana with Abnormal     Gram Stain Result No Epithelial Cells seen   3+ WBC's (Polymorphonuclear)   No organisms seen     Organism Pseudomonas aeruginosa Abnormal     CULTURE, RESPIRATORY Light growth   Narrative:     ORDER#: [id number]                          ORDERED BY: CIRA KEBEDE SOURCE: Sputum Expectorated                COLLECTED:  07/22/22 12:00   ANTIBIOTICS AT GURWINDER.:                      RECEIVED :  07/22/22 12:22   Respiratory Culture [[id number]] (Abnormal)  Collected: 07/19/22 2030   Order Status: Completed Specimen: Sputum Expectorated Updated: 07/23/22 0748    CULTURE, RESPIRATORY Light growth normal respiratory fabiana with Abnormal     Gram Stain Result 2+ Gram positive cocci   2+ WBC's (Polymorphonuclear)   1+ Epithelial Cells     Organism Pseudomonas aeruginosa Abnormal     CULTURE, RESPIRATORY Light growth   Narrative:     ORDER#: [id number]                          ORDERED BY: FARZANA GAN   SOURCE: Sputum Expectorated                COLLECTED:  07/19/22 20:30   ANTIBIOTICS AT GURWINDER.:                      RECEIVED :  07/19/22 21:19   Culture, Blood 1 [[id number]] Collected: 07/18/22 1841   Order Status: Completed Specimen: Blood Updated: 07/22/22 2015    Blood Culture, Routine No Growth after 4 days of incubation. Narrative:     ORDER#: [id number]                          ORDERED BY: Mira Meyers   SOURCE: Blood                              COLLECTED:  07/18/22 18:41   ANTIBIOTICS AT GURWINDER.:                      RECEIVED :  07/18/22 18:56   If child <=2 yrs old please draw pediatric bottle. ~Blood Culture 1   Culture, Blood 2 [[id number]] Collected: 07/18/22 1850   Order Status: Completed Specimen: Blood Updated: 07/22/22 2015    Culture, Blood 2 No Growth after 4 days of incubation. Narrative:     ORDER#: [id number]                          ORDERED BY: Mira Meyers   SOURCE: Blood                              COLLECTED:  07/18/22 18:50   ANTIBIOTICS AT GURWINDER.:                      RECEIVED :  07/18/22 18:56   If child <=2 yrs old please draw pediatric bottle. ~Blood Culture #2   Strep Pneumoniae Antigen [[id number]] Collected: 07/20/22 1020   Order Status: Completed Specimen: Urine, clean catch Updated: 07/21/22 0841    STREP PNEUMONIAE ANTIGEN, URINE --    Presumptive Negative   Presumptive negative suggests no current or recent   pneumococcal infection. Infection due to Strep pneumoniae   cannot be ruled out since the antigen present in the sample   may be below the detection limit of the test.   Normal Range:Presumptive Negative    Narrative:     ORDER#: [id number]                          ORDERED BY: FARZANA GAN   SOURCE: Urine Clean Catch                  COLLECTED:  07/20/22 10:20   ANTIBIOTICS AT GURWINDER.:                      RECEIVED :  07/20/22 10:27   Legionella antigen, urine [[id number]] Collected: 07/20/22 1020   Order Status: Completed Specimen: Urine, catheter Updated: 07/21/22 0840    L. pneumophila Serogp 1 Ur Ag --    Presumptive Negative   No Legionella pneumophila serogroup 1 antigens detected. A negative result does not exclude infection with   Legionella pneumophila serogroup 1 nor does it rule out   other microbial-caused respiratory infections or   disease caused by other serogroups of   Legionella pneumophila.    Normal Range: Presumptive Negative    Narrative:     ORDER#: [id number]                          ORDERED BY: FARZANA GAN   SOURCE: Urine Catheter                     COLLECTED:  07/20/22 10:20   ANTIBIOTICS AT GURWINDER.:                      RECEIVED :  07/20/22 10:28   Sputum gram stain [[id number]] Collected: 07/19/22 2030   Order Status: Canceled Specimen: Sputum Expectorated    Rapid influenza A/B antigens [[id number]] Collected: 07/19/22 0210   Order Status: Completed Specimen: Nares Updated: 07/19/22 0245    Rapid Influenza A Ag Negative    Rapid Influenza B Ag Negative   COVID-19, Rapid [[id number]] Collected: 07/18/22 1825   Order Status: Completed Specimen: Nasopharyngeal Swab Updated: 07/18/22 1854    SARS-CoV-2, NAAT Not Detected    Comment: Rapid NAAT:   Negative results should be treated as presumptive and,   if inconsistent with clinical signs and symptoms or necessary for   patient management, should be tested with an alternative molecular   assay. Negative results do not preclude SARS-CoV-2 infection and   should not be used as the sole basis for patient management decisions. This test has been authorized by the FDA under an Emergency Use   Authorization (EUA) for use by authorized laboratories. Fact sheet for Healthcare Providers:   Maykel.[email or web address]   Fact sheet for Patients: Maykel.[email or web address]     METHODOLOGY: Isothermal Nucleic Acid Amplification         CULTURE, RESPIRATORY Rare growth normal respiratory fabiana with Abnormal     Gram Stain Result No Epithelial Cells seen   3+ WBC's (Polymorphonuclear)   No organisms seen    Organism Pseudomonas aeruginosa Abnormal     CULTURE, RESPIRATORY Light growth    Resulting Agency [address] [facility]          Susceptibility      Pseudomonas aeruginosa (1)    Antibiotic Interpretation Microscan  Method Status    cefepime Sensitive 8 mcg/mL BACTERIAL SUSCEPTIBILITY PANEL BY TIERRA     ciprofloxacin Resistant >2 mcg/mL BACTERIAL SUSCEPTIBILITY PANEL BY TIERRA     gentamicin Sensitive <=4 mcg/mL BACTERIAL SUSCEPTIBILITY PANEL BY TIERRA     meropenem Sensitive <=1 mcg/mL BACTERIAL SUSCEPTIBILITY PANEL BY TIERRA     piperacillin-tazobactam Sensitive <=16 mcg/mL BACTERIAL SUSCEPTIBILITY PANEL BY TIERRA     tobramycin Sensitive <=4 mcg/mL BACTERIAL SUSCEPTIBILITY PANEL BY TIERRA          Narrative  Performed by: [facility]  ORDER#: [id number]                          ORDERED BY: CIRA KEBEDE   SOURCE: Sputum Expectorated                COLLECTED:  07/22/22 12:00   ANTIBIOTICS AT GURWINDER.:                      RECEIVED :  07/22/22 12:22           Lab Results   Component Value Date/Time    BC No Growth after 4 days of incubation. 07/18/2022 06:41 PM    BLOODCULT2 No Growth after 4 days of incubation.  07/18/2022 06:50 PM       Respiratory Culture:  Lab Results   Component Value Date/Time    CULTRESP Normal respiratory fabiana 07/30/2022 04:00 PM    LABGRAM 07/30/2022 04:00 PM     3+ WBC's (Mononuclear)  1+ Epithelial Cells  1+ Gram positive cocci       AFB:No results found for: AFBSMEAR  Viral Culture:  Lab Results   Component Value Date/Time    COVID19 Not Detected 08/01/2022 01:55 PM     Urine Culture: No results for input(s): LABURIN in the last 72 hours. IMAGING:    Fluoroscopy modified barium swallow with video   Final Result   No evidence of aspiration. Please see separate speech pathology report for full discussion of findings   and recommendations. RECOMMENDATIONS:   Unavailable         XR CHEST PORTABLE   Final Result   Left-sided PICC line tip is in the superior vena cava, the tip is turned   upward superiorly, a change from priors. Moderate right pleural effusion and right lung consolidations similar to   prior study. XR CHEST PORTABLE   Final Result   Increasing opacity throughout portions of the right lung, which can reflect   combination of airspace disease, atelectasis, and pleural effusion. XR CHEST PORTABLE   Final Result   Endotracheal tube with the tip approximately 1 cm above the song. Consider   retraction by approximately 3.5 cm. Enteric tube with the tip and the side   hole below the diaphragm overlying the left upper abdomen, likely within the   fundus of the stomach. Redemonstration of multifocal pneumonia affecting the right lung. Small   right pleural effusion. XR CHEST PORTABLE   Final Result   Unchanged multifocal right-sided pneumonia. XR CHEST PORTABLE   Final Result   Stable multifocal airspace disease in the right lung with probable upper lobe   abscess. XR CHEST PORTABLE   Final Result   Stable multifocal airspace disease in the right lung, including probable   pulmonary abscess in the right upper lobe. CT CHEST WO CONTRAST   Final Result   1.  Mixed consolidative and ground-glass opacities as well as interstitial   thickening throughout the majority of the right lung compatible with   pneumonia. 2. There is a cavity in the right upper lobe demonstrating an air-fluid level   measuring up to 9.7 cm concerning for abscess formation. 3. Trace right pleural effusion. 4. Prominent and enlarged mediastinal lymph nodes, likely reactive. 5. Stable 6 mm left lower lobe pulmonary nodule. RECOMMENDATIONS:   Fleischner Society guidelines for follow-up and management of incidentally   detected pulmonary nodules:      Single Solid Nodule:      Nodule size less than 6 mm   In a low-risk patient, no routine follow-up. In a high-risk patient, optional CT at 12 months. Nodule size equals 6-8 mm   In a low-risk patient, CT at 6-12 months, then consider CT at 18-24 months. In a high-risk patient, CT at 6-12 months, then CT at 18-24 months. Nodule size greater than 8 mm         In a low-risk patient, consider CT at 3 months, PET/CT, or tissue sampling. In a high-risk patient, consider CT at 3 months, PET/CT, or tissue sampling. Multiple Solid Nodules:      Nodule size less than 6 mm   In a low-risk patient, no routine follow-up. In a high-risk patient, optional CT at 12 months. Nodule size equals 6-8 mm   In a low-risk patient, CT at 3-6 months, then consider CT at 18-24 months. In a high-risk patient, CT at 3-6 months, then CT at 18-24 months. Nodule size greater than 8 mm   In a low-risk patient, CT at 3-6 months, then consider CT at 18-24 months. In a high-risk patient, CT at 3-6 months, then CT at 18-24 months. - Low risk patients include individuals with minimal or absent history of   smoking and other known risk factors. - High risk patients include individuals with a history or smoking or known   risk factors. Radiology 2017 http://pubs. rsna.org/doi/full/10.1148/radiol. 1810527620         XR CHEST PORTABLE   Final Result   1.  Increased pneumonia throughout the right lung remaining most prominent in   the right upper lobe.   2. Emphysema better demonstrated on CT. 3. Possible trace right pleural effusion. XR CHEST PORTABLE   Final Result   Improved aeration of the right chest with persistent consolidation in the mid   to upper right chest.         CT CHEST WO CONTRAST    (Results Pending)   CT CHEST WO CONTRAST    (Results Pending)     XR CHEST PORTABLE   Final Result   1. Increased pneumonia throughout the right lung remaining most prominent in   the right upper lobe. 2. Emphysema better demonstrated on CT. 3. Possible trace right pleural effusion. XR CHEST PORTABLE   Final Result   Improved aeration of the right chest with persistent consolidation in the mid   to upper right chest.                All pertinent images and reports for the current Hospitalization were reviewed by me.        Scheduled Meds:   tobramycin (PF)  300 mg Nebulization BID    clopidogrel  75 mg Oral Daily    methylPREDNISolone  40 mg IntraVENous Daily    pantoprazole  40 mg Oral QAM AC    ipratropium-albuterol  1 ampule Inhalation 4x daily    aspirin  325 mg Oral Once    aspirin  81 mg Oral Daily    sodium chloride (Inhalant)  15 mL Nebulization Q4H While awake    fluconazole  100 mg Oral Daily    [Held by provider] enoxaparin  40 mg SubCUTAneous Daily    meropenem  1,000 mg IntraVENous Q8H    nystatin  5 mL Oral 4x Daily    sodium chloride flush  5-40 mL IntraVENous 2 times per day    atorvastatin  40 mg Oral Nightly    tiZANidine  4 mg Oral Nightly    gabapentin  600 mg Oral BID    DULoxetine  60 mg Oral BID    mometasone-formoterol  2 puff Inhalation BID       Continuous Infusions:   sodium chloride 5 mL/hr at 08/11/22 0518    dextrose         PRN Meds:  sodium chloride, potassium chloride, sodium chloride, sodium chloride flush, acetaminophen, perflutren lipid microspheres, traZODone, ipratropium-albuterol, cetirizine, albuterol sulfate HFA, glucose, dextrose bolus **OR** dextrose bolus, glucagon (rDNA), dextrose, ondansetron, polyethylene glycol      Assessment:     Patient Active Problem List   Diagnosis    Osteopenia-last dexa 2009 repeat 2015 (-2.4 hip)--advised tx    Colon polyp, hyperplastic,-(done 3/11-repeat 3-5 yrs--dr Lamberto Rangel)    Family history of colon cancer    CTS (carpal tunnel syndrome)BILATERAL-s/p surgical repair--resolved     Postmenopausal status-last mammogram 2/15 wnl last pap 12/13--wnl    Nondependent alcohol abuse, in remission    Urticaria, chronic    Smoking    Chronic back pain-(djd) saw dr Zakia Schmitt afford surgery--seeing dr Li Almanza for pain meds    Fibromyalgia    Hypercholesteremia    Lumbar spondylosis    Vitamin D deficiency--severe--started 50,000 iu 2x/ wk 11/13    Insomnia--on elevil w help    Constipation--on daily miralax    Depression    Chronic pain syndrome    Muscle cramping    Acute on chronic respiratory failure with hypercapnia ([address]    ROLY (acute kidney injury) ([address]    Severe sepsis (HCC)    Gastroesophageal reflux disease    COPD, severe ([address]    Chronic hypoxemic respiratory failure ([address]    Degeneration of lumbar or lumbosacral intervertebral disc    Trochanteric bursitis of right hip    COPD exacerbation ([address]    Diabetes ([address]    Controlled type 2 diabetes mellitus without complication, without long-term current use of insulin ([address]    Age-related osteoporosis without current pathological fracture- started alendronate 9/2021    Pulmonary nodule seen on imaging study    Pneumonia of right lung due to infectious organism    General weakness    Elevated lactic acid level    Community acquired pneumonia of right lung    Chronic obstructive pulmonary disease ([address]    Acute respiratory failure with hypoxia (HCC)    Acute on chronic respiratory failure with hypoxia and hypercapnia (HCC)    Abnormal EKG    NSTEMI (non-ST elevated myocardial infarction) ([address]    Necrotizing pneumonia (HCC)    Abscess of upper lobe of right lung with pneumonia ([address]    Pseudomonas respiratory infection Abnormal CT of the chest    Peripherally inserted central catheter (PICC) in place    Massive hemoptysis    Epistaxis    Acute respiratory insufficiency    Acute blood loss anemia    Hypotension due to drugs     Severe necrotizing Pseudomonas pneumonia  Right upper lobe cavitary lesion  Right right lung evolving abscess  Right lung dense consolidation of  Pseudomonas pneumonia developing some resistance  COPD exacerbation  Hypoxic respiratory failure  Coronary artery disease  Status post cardiac cath  Chronic smoking  Abnormal CT chest  BNP elevation  COVID-19 negative  Epistaxis severe vs Hemoptysis now intubated sedated on the ventilator for airway protection 8/3/22   Now extubated to Venturi mask  Rhino Rocket removed by ENT      Unfortunately she developed progressive changes with dense consolidation and lung abscess secondary to Pseudomonas pneumonia. Pseudomonas appears to have developed resistance to quinolones while on therapy this is concerning.   CT chest on this admission grossly abnormal and definitely there is progressive changes given the severity of the -pneumonia will need IV antibiotics     OPAT d/w pt she needs to QUIT smoking d/w pt and her family        Given the lung findings will have to continue antibiotic therapy anticipate least 2 more weeks of duration of treatment    repeat sputum testing to see if Pseudomonas is clearing out on the current therapy-sputum repeat Normal fabiana noted      transferred to ICU secondary to massive epistaxis versus was  intubated on the ventilator status post bronchoscopy      Extubated on 8/4/22 and on Venturi mask has Rhinorocket in place ENT notes reviewed      Still has mild epistaxis noted better controlled ENT is following closely and the current antibiotics will provide sinus coverage     May repeat CT chest before d/c is she stays longer as in patient     Rhino Rocket removed by ENT today no further epistaxis noted tolerating antibiotic therapy okay    She is still sob and CXR Rt side still worse and given her age and severe infection risk for progression -     Repeat CT chest completed it to continue IV antibiotic  Until 8/30    Labs, Microbiology, Radiology and all the pertinent results from current hospitalization and  care every where were reviewed  by me as a part of the evaluation   Plan:   Cont  IV Meropenem 1 gm q 8 HR X stop date  x  8/30  Will change to Q 12 hr DOSING AT ad/c as SNF unable to do Q 8hr dosing for dc planning   Cont INH - Tobramycin for now Q 12 hrs  4 . Picc line in place   5 QUIT smoking  6. Will repeat CT chest as in patient if she stays longer in house  7 Cont supportive care  8. CT images reviewed see above    9. Risk for progression  10 on  Venturi mask  11. Repeat sputum cleared   12. Robby DONE   13, ENT following for massive Epistaxis - controlled with Rhino rocket - Rhino Rocket removed   14. CXR not improving much is of concern risk for intubation -  15 CT CHEST reviewed ongoing right upper lobe cavitary lesion with pneumonia consolidation seems to be slowly improving        Discussed with patient/Family and Nursing   Risk of Complications/Morbidity: High      Illness(es)/ Infection present that pose threat to bodily function. There is potential for severe exacerbation of infection/side effects of treatment. Therapy requires intensive monitoring for antimicrobial agent toxicity. Thanks for allowing me to participate in your patient's care and please call me with any questions or concerns.     Albert Elmore MD  Infectious Disease  [facility] ([facility]) Physician  Phone: [phone]   Fax : [phone]

## 2022-08-11 NOTE — PROGRESS NOTES
Nicky 83   Daily Progress Note      Admit Date:  7/18/2022    CC: SOB    HPI:   Nazario Gold is a 78 y.o. female with PMH COPD (4L NC @ home), HLP, depression, + smoker. Hospitalized for PNA (pseudomonas) 7/3-7/13/2022. DC to ECF and presented back to [facility] 4 days later with SOB/hypoxia. EKG with t wave inversion V2-5/NSTEMI. Started on asa and heparin. Underwent LHC with KIRILL to OM1. Now with ongoing resp failure-necrotizing/resistant PNA, RUL abscess, COPD. Intubated>extubated 8/4. ID managing. Was on Levo for hypotension, now improved, levo off. Epistaxis s/p rhino rocket. Asa restarted, effient changed to plavix. Today she is sitting up in chair. She gets OOB with PT/OT. C/O generalized weakness and fatigue. Continues with SOB. On 11L NC. Associated with productive cough- light yellow. Has generalized chest discomfort - reproducible and worsens with cough. Denies worsening SOB or CP with exertion. Review of Systems:  General: Denies fever, chills  Skin: Denies skin changes, rash, itching, lesions.   HEENT: Denies headache, dizziness, vision changes, nosebleeds, sore throat, nasal drainage  RESP: Denies wheeze, snoring  CARD: Denies palpitations,  murmur  GI:Denies nausea, vomiting, heartburn, loss of appetite, change in bowels  : Denies frequency, pain, incontinence, polyuria  VASC: Denies claudication, leg cramps, clots  MUSC/SKEL: Denies pain, stiffness, arthritis  PSYCH: Denies anxiety, depression, stress  NEURO: Denies numbness, tingling, weakness,change in mood or memory  HEME: Denies abn bruising, bleeding, anemia  ENDO: Denies intolerance to heat, cold, excessive thirst or hunger, hx thyroid disease     Objective:   BP (!) 92/52   Pulse (!) 103   Temp 97.2 °F (36.2 °C) (Axillary)   Resp 20   Ht 5' 4\" (1.626 m)   Wt 131 lb 6.3 oz (59.6 kg)   SpO2 100%   BMI 22.55 kg/m²       Intake/Output Summary (Last 24 hours) at 8/10/2022 6442  Last data filed at 8/10/2022 0707      Gross per 24 hour   Intake 325.24 ml   Output 630 ml   Net -304.76 ml      I/O since adm: Neg 13L     WEIGHT:Admit Weight: 135 lb 9.3 oz (61.5 kg)                                      Today          Weight: 131 lb 6.3 oz (59.6 kg)   DRY WEIGHT:      Wt Readings from Last 3 Encounters:   08/09/22 131 lb 6.3 oz (59.6 kg)   07/13/22 133 lb 13.1 oz (60.7 kg)   06/20/22 138 lb (62.6 kg)         Physical Exam:  GEN: Appears frail no acute distress  SKIN: Pale, warm, dry. HEENT: PERRLA. Normocephalic, atraumatic. Neck supple. No adenopathy. LUNG: AP diameter normal. Diminished BS R>L, few crackles. No wheeze or ronchi. Respiratory effort increased. HEART: S1S2 A/R. No JVD. No carotid bruit. No murmur, rub or gallop. ABD: Soft, nontender. +BS X 4 quads. No hepatomegaly. EXT: Radial and pedal pulses 2+ and symmetric. Without varicosities. No edema. MUSCSKEL: Good ROM X4 extremities. No deformity. NEURO: A/O X3. Calm and cooperative.      Telemetry: ST     Medications:   Scheduled Medications    ipratropium-albuterol  1 ampule Inhalation 4x daily    oxymetazoline  2 spray Each Nostril BID    aspirin  325 mg Oral Once    aspirin  81 mg Oral Daily    sodium chloride (Inhalant)  15 mL Nebulization Q4H While awake    fluconazole  100 mg Oral Daily           meropenem  1,000 mg IntraVENous Q8H    nystatin  5 mL Oral 4x Daily    [Held by provider] prasugrel  10 mg Oral Daily    sodium chloride flush  5-40 mL IntraVENous 2 times per day    atorvastatin  40 mg Oral Nightly    tiZANidine  4 mg Oral Nightly    gabapentin  600 mg Oral BID    DULoxetine  60 mg Oral BID    mometasone-formoterol  2 puff Inhalation BID         Infusions Meds    norepinephrine Stopped (08/10/22 0446)    sodium chloride 10 mL/hr at 08/07/22 0850    dextrose           PRN Medications   potassium chloride, sodium chloride, sodium chloride flush, sodium chloride, acetaminophen, perflutren lipid microspheres, traZODone, ipratropium-albuterol, cetirizine, albuterol sulfate HFA, glucose, dextrose bolus **OR** dextrose bolus, glucagon (rDNA), dextrose, ondansetron, polyethylene glycol        Lab Data: I have reviewed all labs below today. CBC:         Recent Labs     08/08/22  0401 08/09/22  0910 08/10/22  0610   WBC 6.9 8.7 9.0   HGB 7.4* 8.0* 7.5*   HCT 22.3* 24.2* 22.0*   MCV 90.1 90.3 88.9    491* 487*      BMP:         Recent Labs     08/08/22  0401 08/09/22  0910 08/10/22  0610    142 138   K 3.7 4.5 4.8   CL 96* 99 97*   CO2 36* 36* 35*   PHOS  -- 3.8 4.2   BUN 11 14 10   CREATININE <0.5* <0.5* <0.5*      GLUCOSE:         Recent Labs     08/08/22  0401 08/09/22  0910 08/10/22  0610   GLUCOSE 170* 113* 106*      LIVER PROFILE:         Lab Results   Component Value Date/Time     AST 61 07/18/2022 05:20 PM     ALT 65 07/18/2022 05:20 PM     LABALBU 2.4 07/18/2022 05:20 PM     BILITOT 0.5 07/18/2022 05:20 PM     ALKPHOS 88 07/18/2022 05:20 PM      PT/INR: No results found for: PROTIME, INR  APTT:         Lab Results   Component Value Date/Time     APTT 70.9 07/21/2022 06:07 AM      Pro-BNP:                Lab Results   Component Value Date/Time     PROBNP 3,081 08/04/2022 05:29 AM     PROBNP 11,082 07/18/2022 06:50 PM     PROBNP 1,850 07/03/2022 03:10 PM      Parameters:  > 450 pg/mL under age 48  > 900 pg/mL ages 54-65  > 1800 pg/mL over age 76     ENZYMES:        Lab Results   Component Value Date/Time     TROPONINI 0.52 07/18/2022 10:29 PM     TROPONINI 0.70 07/18/2022 05:20 PM     TROPONINI 0.01 07/03/2022 03:10 PM      FASTING LIPID PANEL:        Lab Results   Component Value Date/Time     CHOL 171 02/17/2022 10:29 AM     HDL 59 02/17/2022 10:29 AM     LDLCALC 83 02/17/2022 10:29 AM     TRIG 146 02/17/2022 10:29 AM         Diagnostics:    EKG:   Sinus tachycardia  Otherwise normal ECG  When compared with ECG of 21-JUL-2022 17:13,  Premature ventricular complexes are no longer Present  Vent.  rate has increased BY 49 BPM  Nonspecific T wave abnormality no longer evident in Inferior leads  T wave inversion no longer evident in Anterior leads  Confirmed by Roberth Vaughan MD, [facility] ([id number]) on 7/27/2022 8:44:39 AM     ECHO: 7/19/2022  Summary  Left ventricular cavity size is normal with mild LV hypertrophy and normal systolic function. Ejection fraction is visually estimated to be 55-60%. There are no regional wall motion abnormalities noted. Grade I diastolic dysfunction noted. The right ventricle is normal in size and function. There are no significant valvular abnormalities appreciated in this study. Cardiac Angiography: 7/21/2022  ANGIOGRAM/CORONARY ARTERIOGRAM:     The left circumflex artery has mild disease              Om1 99%  INTERVENTION  Guide catheter: XB 3.5 Guide  Runthrough wire used to cross OM1 lesion  Predilation with 3.0 regular balllon  IVUS passed to distal OM, reference diameter 3 mm  Haily 3.0 X 15 mm KIRILL  Post dilation with 3.0 NC balloon to 15 ALEXEI   SUMMARY:   Single vessel CAD  S/p successful IVUS guided PCI OM1 X 1 KIRILL      Assessment/Plan:     1.) CAD/NSTEMI s/p PCI/KIRILL to OM1: Stable, no angina. Chest discomfort is musculoskeletal R/T frequent cough. DAPT: asa, plavix  Beta Blocker: start low dose BB - toprol XL 12.5mg daily  ACEi/ARB: none, EF 55-60%  Anti anginal:   Lipid management/high intensity statin: lipitor  Risk factor management: high blood pressure, high cholesterol, Diabetes, smoking, obesity, family hx  Lifestyle modification: Heart healthy diet, regular exercise, weight loss, smoking cessation, stress reduction  Cardiac Rehab: Phase 2 (after PT/OT)    2.) Acute on chronic resp failure R/T cavitatory PNA/pseudomonas lung abscess and severe COPD:  Tx per ID/Pulm    3.) Chronic diastolic heart failure, Gr I, LVH: Euvolemic  Cont BB, does not need diuretic. ACEi, SGLT2i, AA not indicated for EF>40%. ICD not indicated with normal EF. Stable from CV standpoint. Will sign off. Patient will need 1-2 week FU post discharge with [facility].      Electronically signed by HECTOR López CNP on 8/11/2022 at 10:01 AM

## 2022-08-11 NOTE — PROGRESS NOTES
Occupational Therapy  Facility/Department: [facility] 0J ICU  Occupational Therapy Daily Treatment Note    Name: Ericka Jernigan  : 1945  MRN: [id number]  Date of Service: 2022    Discharge Recommendations:  3-5 sessions per week, Patient would benefit from continued therapy after discharge  OT Equipment Recommendations  Other: defer to [address] scored a 15/24 on the AM-PAC ADL Inpatient form. Current research shows that an AM-PAC score of 17 or less is typically not associated with a discharge to the patient's home setting. Based on the patient's AM-PAC score and their current ADL deficits, it is recommended that the patient have 3-5 sessions per week of Occupational Therapy at d/c to increase the patient's independence. Please see assessment section for further patient specific details. If patient discharges prior to next session this note will serve as a discharge summary. Please see below for the latest assessment towards goals. Patient Diagnosis(es): The primary encounter diagnosis was NSTEMI (non-ST elevated myocardial infarction) ([facility] [address] Diagnoses of Pneumonia of right lung due to infectious organism, unspecified part of lung, Hypoxia, and Abscess of upper lobe of right lung with pneumonia [facility]) were also pertinent to this visit. Past Medical History:  has a past medical history of CAD (coronary artery disease), Chronic pain syndrome, Colorectal polyps, COPD (chronic obstructive pulmonary disease) (HCC), CTS (carpal tunnel syndrome), DDD (degenerative disc disease), lumbar, Depression, Family hx of colon cancer, Fibromyalgia, Hyperlipemia, IBS (irritable bowel syndrome), Lumbar spondylosis, New onset type 2 diabetes mellitus ([facility] [address] On home O2, Pneumonia, and Urticaria, chronic. Past Surgical History:  has a past surgical history that includes Tympanostomy tube placement; Cervical polyp removal (2004); Carpal tunnel release (Bilateral); Tubal ligation;  Intracapsular cataract extraction (Left, 06/23/2020); Intracapsular cataract extraction (Right, 07/14/2020); Coronary angioplasty with stent (07/19/2022); Cataract extraction; and bronchoscopy (N/A, 8/3/2022). Assessment   Performance deficits / Impairments: Decreased functional mobility ; Decreased ADL status; Decreased ROM; Decreased strength;Decreased endurance;Decreased balance;Decreased high-level IADLs  Assessment: Pt is a 78 y.o. female admitted from SNF with Acute on chronic respiratory failure with hypoxia and hypercapnia, NSTEMI, and necrotizing PNA. Pt recent admit with COPD Exac, ROLY, UTI and Sepsis and d/c'd on 7/13/2022 to Home at [facility] for rehab. At baseline, pt lives with significant other in apartment setting and independent ADLs, IADLs, and fxl mobility. Pt currently functioning below baseline d/t the above deficits. Today, pt required min A for transfers and min A x 2 to take a few steps bed > chair with RW. Pt decreased endurance and requires extended rest breaks between bouts of activity. Anticipate pt would require overall min/mod A for ADLs. Pt demonstrates need for ongoing skilled OT at d/c to maximize pt's safety and independence prior to return home. Prognosis: Fair  REQUIRES OT FOLLOW-UP: Yes  Activity Tolerance  Activity Tolerance: Patient limited by fatigue  Activity Tolerance Comments: decreased endurance; pt on NRB set at 10L, desat to 82% briefly with transfer to chair and 79% with 2nd stand but quickly recovered to above 90% once sitting. Plan   Plan  Times per Week: 3-5  Current Treatment Recommendations: Strengthening, ROM, Balance training, Functional mobility training, Endurance training, Safety education & training, Self-Care / ADL     Restrictions  Restrictions/Precautions  Restrictions/Precautions: Fall Risk  Position Activity Restriction  Other position/activity restrictions: NRB set at 10L;  (No supplemental oxygen by nasal cannula per ENT note 8/7);     Subjective General  Chart Reviewed: Yes  Patient assessed for rehabilitation services?: Yes  Additional Pertinent Hx: Per Dr. Mark Alvarado H&P: \"Pt is an 78y.o. year-old female with a history of hyperlipidemia, fibromyalgia, chronic pain syndrome, severe COPD with chronic hypoxic respiratory failure requiring oxygen at 4 L/min via nasal cannula continuously. She presents to the emergency room for evaluation following a 2 to 3-day history of worsening shortness of breath. She was recently treated for Pseudomonas pneumonia and discharged to rehab. EMS reports that she was on a very long oxygen tube when they arrived. She was placed on 6 L nasal cannula and her oxygen saturation improved. In the emergency room she was found to have an abnormal EKG with inverted T waves in V2 through V5. Cardiology was consulted and asked that she be put on a heparin drip. Patient denies any current or recent chest pain. She is being admitted for further evaluation and treatment. \" Pt s/p C on 7/19. 7/27 Pt with rapid response called and transferred to ICU d/t respiratory distress and placed on Bipap. Pt transferred out of ICU 7/28. 8/3 transferred to ICU again d/t hemoptysis. 8/3-8/4/2022 Pt Intubated  Family / Caregiver Present: No  Referring Practitioner: Dr. Tomasz Reed  Diagnosis: Acute on chronic respiratory failure with hypoxia and hypercapnia, NSTEMI, Necrotizing pneumonia  Subjective  Subjective: Pt seen bedside and agreeable to therapy. Pt reports feeling good today.   General Comment  Comments: Per RN ok for therapy     Social/Functional History  Social/Functional History  Lives With: Significant other (Ray)  Type of Home: Apartment (1st floor.)  Home Layout: One level (laundry in apt)  Home Access: Level entry  Bathroom Shower/Tub: Tub/Shower unit  Bathroom Toilet: Standard  Bathroom Equipment: Shower chair  Bathroom Accessibility: Accessible  Home Equipment: Cane  ADL Assistance: [address] [place] [address] Avenue: Independent (Shared with sign other.)  Ambulation Assistance: Independent (Without assist device.)  Transfer Assistance: Independent  Active : No (Sign other drives.)  Patient's  Info: significant other drives  Occupation: Retired  Additional Comments: Pt recent admit 7/3/2022 with COPD Exac, ROLY, UTI and Sepsis and d/c'd on 7/13/2022 to Home at [facility] SNF setting. Objective     Safety Devices  Type of Devices: Call light within reach;Nurse notified; Left in chair        AROM: Within functional limits  Strength: Generally decreased, functional  Coordination: Within functional limits           Bed mobility  Supine to Sit: Minimal assistance (HOB elevated)  Sit to Supine:  (pt in chair at end of session)    Transfers  Sit to stand: Minimal assistance  Stand to sit: Minimal assistance  Transfer Comments: Pt stood from bed with min A with RW, took a few steps bed > chair with RW and min A x 2. Pt then stood again from chair and took 5 steps in place with min A x 2 with RW support. Slight posterior lean when standing. Desats with activity and requires extended rest breaks between bouts of activity. Cognition  Overall Cognitive Status: WFL  Orientation  Orientation Level: Oriented to person;Oriented to place;Oriented to time               A/AROM Exercises: shoulder flex x 10;  forward punches x 10       AM-PAC Score    AM-[facility] Inpatient Daily Activity Raw Score: 15 (08/10/22 0940)  AM-PAC Inpatient ADL T-Scale Score : 34.69 (08/10/22 0940)  ADL Inpatient CMS 0-100% Score: 56.46 (08/10/22 0940)  ADL Inpatient CMS G-Code Modifier : CK (08/10/22 0940)    Goals  Short Term Goals  Time Frame for Short term goals: Prior to d/c: goals ongoing  Short Term Goal 1: Pt will bathe with SBA. Short Term Goal 2: Pt will dress with SBA. Short Term Goal 3: Pt will toilet with SBA/supervision. Short Term Goal 4: Pt will complete fxl mobility and fxl transfers to/from ADL surfaces with SBA/supervision using AD.   Short Term Goal 5: Pt will tolerate standing >5 minutes for functional task with SBA/supervision while maintaining O2 sats >90% to improve standing tolerance for ADL routine. Long Term Goals  Time Frame for Long term goals : STGs=LTGs  Patient Goals   Patient goals : to finish rehab at eventually return home. Therapy Time   Individual Concurrent Group Co-treatment   Time In 0720         Time Out 0800         Minutes 40         Timed Code Treatment Minutes: 40 Minutes     This note to serve as OT d/c summary if pt is d/c-ed prior to next therapy session.     [facility], OTR/L

## 2022-08-11 NOTE — PROGRESS NOTES
Hospitalist Progress Note      PCP: HECTOR White - CNP    Date of Admission: 7/18/2022    Chief Complaint: respiratory distress    Hospital Course:    Pt is an 78y.o. year-old female with a history of hyperlipidemia, fibromyalgia, chronic pain syndrome, severe COPD with chronic hypoxic respiratory failure requiring oxygen at 4 L/min via nasal cannula continuously. She presents to the emergency room for evaluation following a 2 to 3-day history of worsening shortness of breath. She was recently treated for Pseudomonas pneumonia and discharged to rehab. EMS reports that she was on a very long oxygen tube when they arrived. She was placed on 6 L nasal cannula and her oxygen saturation improved. In the emergency room she was found to have an abnormal EKG with inverted T waves in V2 through V5. Cardiology was consulted and asked that she be put on a heparin drip. Patient denies any current or recent chest pain. She is being admitted for further evaluation and treatment. Associated signs and symptoms do not include chest pain, lightheaded, dizziness, diaphoresis, edema, orthopnea, paroxysmal nocturnal dyspnea, fever or chills. No recent medication changes. 8/10  Patient continues to require oxygen per nonrebreather, 12 L. Intermittent need of vasopressors. 8/11  Patient appears to be doing well, tolerating nasal cannula, 10 L. LTAC placement is pending, due to significant O2 requirements unable to go to regular nursing facility.      Subjective:  as above    Medications:  Reviewed    Infusion Medications    sodium chloride 5 mL/hr at 08/11/22 0518    dextrose       Scheduled Medications    metoprolol succinate  12.5 mg Oral Daily    tobramycin (PF)  300 mg Nebulization BID    clopidogrel  75 mg Oral Daily    methylPREDNISolone  40 mg IntraVENous Daily    pantoprazole  40 mg Oral QAM AC    ipratropium-albuterol  1 ampule Inhalation 4x daily    aspirin  325 mg Oral Once    aspirin 81 mg Oral Daily    sodium chloride (Inhalant)  15 mL Nebulization Q4H While awake    [Held by provider] enoxaparin  40 mg SubCUTAneous Daily    meropenem  1,000 mg IntraVENous Q8H    nystatin  5 mL Oral 4x Daily    sodium chloride flush  5-40 mL IntraVENous 2 times per day    atorvastatin  40 mg Oral Nightly    tiZANidine  4 mg Oral Nightly    gabapentin  600 mg Oral BID    DULoxetine  60 mg Oral BID    mometasone-formoterol  2 puff Inhalation BID     PRN Meds: sodium chloride, calcium carbonate, potassium chloride, sodium chloride, sodium chloride flush, acetaminophen, perflutren lipid microspheres, traZODone, ipratropium-albuterol, cetirizine, albuterol sulfate HFA, glucose, dextrose bolus **OR** dextrose bolus, glucagon (rDNA), dextrose, ondansetron, polyethylene glycol      Intake/Output Summary (Last 24 hours) at 8/11/2022 1407  Last data filed at 8/11/2022 0517  Gross per 24 hour   Intake 569.53 ml   Output 1570 ml   Net -1000.47 ml         Exam:    /61   Pulse 88   Temp 98 °F (36.7 °C) (Axillary)   Resp 18   Ht 5' 4\" (1.626 m)   Wt 129 lb 10.1 oz (58.8 kg)   SpO2 100%   BMI 22.25 kg/m²     General appearance: Anxious+, appears stated age and cooperative. HEENT: Pupils equal, round, and reactive to light. Conjunctivae/corneas clear. Neck: Supple, with full range of motion. No jugular venous distention. Trachea midline. Respiratory:  Comfortably on Bipap. Crackles bibasally. Cardiovascular: Regular rate and rhythm with normal S1/S2 without murmurs, rubs or gallops. Abdomen: Soft, non-tender, non-distended with normal bowel sounds. Musculoskeletal: No clubbing, cyanosis or edema bilaterally. Full range of motion without deformity. Skin: Skin color, texture, turgor normal.  No rashes or lesions. Neurologic:  Neurovascularly intact without any focal sensory/motor deficits.  Cranial nerves: II-XII intact, grossly non-focal.  Psychiatric: Alert and oriented, thought content appropriate, normal insight  Capillary Refill: Brisk,< 3 seconds   Peripheral Pulses: +2 palpable, equal bilaterally       Labs:   Recent Labs     08/09/22  0910 08/10/22  0610 08/11/22  0756   WBC 8.7 9.0 9.8   HGB 8.0* 7.5* 8.9*   HCT 24.2* 22.0* 26.0*   * 487* 599*       Recent Labs     08/09/22  0910 08/10/22  0610 08/11/22  0756    138 134*   K 4.5 4.8 4.8   CL 99 97* 93*   CO2 36* 35* 33*   BUN 14 10 13   CREATININE <0.5* <0.5* <0.5*   CALCIUM 8.7 8.8 9.3   PHOS 3.8 4.2 4.0       No results for input(s): AST, ALT, BILIDIR, BILITOT, ALKPHOS in the last 72 hours. No results for input(s): INR in the last 72 hours. No results for input(s): Cydney Horn in the last 72 hours. Urinalysis:      Lab Results   Component Value Date/Time    NITRU Negative 07/22/2022 06:31 AM    WBCUA 2 07/22/2022 06:31 AM    BACTERIA None Seen 07/22/2022 06:31 AM    RBCUA 5 07/22/2022 06:31 AM    BLOODU Negative 07/22/2022 06:31 AM    SPECGRAV 1.049 07/22/2022 06:31 AM    GLUCOSEU Negative 07/22/2022 06:31 AM       Radiology:  CT CHEST WO CONTRAST   Final Result   1. Multifocal pneumonia, primary in the right lung, with areas of improvement   since 07/23/2022. Right upper lobe pulmonary abscess is persistent, mildly   decreased with small amount of fluid in the interval.   2. Few foci of airspace disease in the left lower lobe are mildly increased. 3. Persistent small right pleural effusion. 4. Stable mediastinal adenopathy, likely reactive in the setting. Fluoroscopy modified barium swallow with video   Final Result   No evidence of aspiration. Please see separate speech pathology report for full discussion of findings   and recommendations. RECOMMENDATIONS:   Unavailable         XR CHEST PORTABLE   Final Result   Left-sided PICC line tip is in the superior vena cava, the tip is turned   upward superiorly, a change from priors.       Moderate right pleural effusion and right lung consolidations similar to prior study. XR CHEST PORTABLE   Final Result   Increasing opacity throughout portions of the right lung, which can reflect   combination of airspace disease, atelectasis, and pleural effusion. XR CHEST PORTABLE   Final Result   Endotracheal tube with the tip approximately 1 cm above the song. Consider   retraction by approximately 3.5 cm. Enteric tube with the tip and the side   hole below the diaphragm overlying the left upper abdomen, likely within the   fundus of the stomach. Redemonstration of multifocal pneumonia affecting the right lung. Small   right pleural effusion. XR CHEST PORTABLE   Final Result   Unchanged multifocal right-sided pneumonia. XR CHEST PORTABLE   Final Result   Stable multifocal airspace disease in the right lung with probable upper lobe   abscess. XR CHEST PORTABLE   Final Result   Stable multifocal airspace disease in the right lung, including probable   pulmonary abscess in the right upper lobe. CT CHEST WO CONTRAST   Final Result   1. Mixed consolidative and ground-glass opacities as well as interstitial   thickening throughout the majority of the right lung compatible with   pneumonia. 2. There is a cavity in the right upper lobe demonstrating an air-fluid level   measuring up to 9.7 cm concerning for abscess formation. 3. Trace right pleural effusion. 4. Prominent and enlarged mediastinal lymph nodes, likely reactive. 5. Stable 6 mm left lower lobe pulmonary nodule. RECOMMENDATIONS:   Fleischner Society guidelines for follow-up and management of incidentally   detected pulmonary nodules:      Single Solid Nodule:      Nodule size less than 6 mm   In a low-risk patient, no routine follow-up. In a high-risk patient, optional CT at 12 months. Nodule size equals 6-8 mm   In a low-risk patient, CT at 6-12 months, then consider CT at 18-24 months.    In a high-risk patient, CT at 6-12 months, then CT at 18-24 months. Nodule size greater than 8 mm         In a low-risk patient, consider CT at 3 months, PET/CT, or tissue sampling. In a high-risk patient, consider CT at 3 months, PET/CT, or tissue sampling. Multiple Solid Nodules:      Nodule size less than 6 mm   In a low-risk patient, no routine follow-up. In a high-risk patient, optional CT at 12 months. Nodule size equals 6-8 mm   In a low-risk patient, CT at 3-6 months, then consider CT at 18-24 months. In a high-risk patient, CT at 3-6 months, then CT at 18-24 months. Nodule size greater than 8 mm   In a low-risk patient, CT at 3-6 months, then consider CT at 18-24 months. In a high-risk patient, CT at 3-6 months, then CT at 18-24 months. - Low risk patients include individuals with minimal or absent history of   smoking and other known risk factors. - High risk patients include individuals with a history or smoking or known   risk factors. Radiology 2017 http://pubs. rsna.org/doi/full/10.1148/radiol. 5125935340         XR CHEST PORTABLE   Final Result   1. Increased pneumonia throughout the right lung remaining most prominent in   the right upper lobe. 2. Emphysema better demonstrated on CT. 3. Possible trace right pleural effusion.          XR CHEST PORTABLE   Final Result   Improved aeration of the right chest with persistent consolidation in the mid   to upper right chest.         CT CHEST WO CONTRAST    (Results Pending)           Assessment/Plan:    Active Hospital Problems    Diagnosis Date Noted    Acute respiratory insufficiency [R06.89] 08/04/2022     Priority: Medium    Acute blood loss anemia [D62] 08/04/2022     Priority: Medium    Hypotension due to drugs [I95.2] 08/04/2022     Priority: Medium    Massive hemoptysis [R04.2] 08/03/2022     Priority: Medium    Epistaxis [R04.0] 08/03/2022     Priority: Medium    Peripherally inserted central catheter (PICC) in place [Z45.2] 07/31/2022     Priority: Medium NSTEMI (non-ST elevated myocardial infarction) ([facility] [I21.4] 07/26/2022     Priority: Medium    Necrotizing pneumonia ([facility] [J85.0] 07/26/2022     Priority: Medium    Abscess of upper lobe of right lung with pneumonia ([facility] [J85.1] 07/26/2022     Priority: Medium    Pseudomonas respiratory infection [J98.8, B96.5] 07/26/2022     Priority: Medium    Abnormal CT of the chest [R93.89] 07/26/2022     Priority: Medium    Acute on chronic respiratory failure with hypoxia and hypercapnia (HCC) [J96.21, J96.22] 07/18/2022     Priority: Medium    Abnormal EKG [R94.31] 07/18/2022     Priority: Medium    Chronic obstructive pulmonary disease ([facility] [J44.9]      Priority: Medium    Pneumonia of right lung due to infectious organism [J18.9] 07/03/2022     Priority: Medium    COPD, severe ([facility] [J44.9] 02/24/2017    Chronic pain syndrome [G89.4] 03/14/2016    Hypercholesteremia [E78.00] 11/04/2013    Fibromyalgia [M79.7]     Smoking [F17.200] 08/27/2011     Acute on chronic hypoxic respiratory failure   -Now on nasal cannula 10 L  -Try to wean O2 down to regular nasal cannula tolerated be discharged home  -Solu-Medrol started, for 5 days    2. 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 in situ   -CT surgery consulted:  would need pneumonectomy for which she is a poor candidate --> medical therapy only  - Respiratory sputum culture repeated on 7/30 without pseudomonnas  -  3. Epistaxis- resolved. Right nare Rhinorocket Dcd.  -DC lovenox.  -Keep ASA and prasugrel going  -HnH qd  -Transfuse PRBCs if hb is below 7.0  -Transfer out of ICU in next 24hrs        4. Critical coronary artery disease single-vessel  Status post PCI to left circumflex by Dr Robert Adair on this admission  with stent  Now on antiplatelet therapy, aspirin and Plavix     5. Anemia, AOCD. Now has hemoptysis. FU iron studies TIBC ferritin % and reticulocyte count  No indication to transfusion yet       6.

## 2022-08-11 NOTE — PLAN OF CARE
Problem: Discharge Planning  Goal: Discharge to home or other facility with appropriate resources  Outcome: Progressing  Flowsheets (Taken 8/10/2022 2015 by Annmarie Lyles RN)  Discharge to home or other facility with appropriate resources:   Identify barriers to discharge with patient and caregiver   Arrange for needed discharge resources and transportation as appropriate   Identify discharge learning needs (meds, wound care, etc)   Refer to discharge planning if patient needs post-hospital services based on physician order or complex needs related to functional status, cognitive ability or social support system     Problem: Pain  Goal: Verbalizes/displays adequate comfort level or baseline comfort level  Outcome: Progressing  Flowsheets (Taken 8/11/2022 0800)  Verbalizes/displays adequate comfort level or baseline comfort level: Encourage patient to monitor pain and request assistance     Problem: Confusion  Goal: Confusion, delirium, dementia, or psychosis is improved or at baseline  Description: INTERVENTIONS:  1. Assess for possible contributors to thought disturbance, including medications, impaired vision or hearing, underlying metabolic abnormalities, dehydration, psychiatric diagnoses, and notify attending LIP  2. Theresa high risk fall precautions, as indicated  3. Provide frequent short contacts to provide reality reorientation, refocusing and direction  4. Decrease environmental stimuli, including noise as appropriate  5. Monitor and intervene to maintain adequate nutrition, hydration, elimination, sleep and activity  6. If unable to ensure safety without constant attention obtain sitter and review sitter guidelines with assigned personnel  7. Initiate Psychosocial CNS and Spiritual Care consult, as indicated  Outcome: Progressing     Problem: Skin/Tissue Integrity  Goal: Absence of new skin breakdown  Description: 1. Monitor for areas of redness and/or skin breakdown  2.   Assess vascular access sites hourly  3. Every 4-6 hours minimum:  Change oxygen saturation probe site  4. Every 4-6 hours:  If on nasal continuous positive airway pressure, respiratory therapy assess nares and determine need for appliance change or resting period. Outcome: Progressing     Problem: Safety - Adult  Goal: Free from fall injury  Outcome: Progressing  Flowsheets (Taken 8/10/2022 2304 by Annmarie Lyles, RN)  Free From Fall Injury: Instruct family/caregiver on patient safety     Problem: ABCDS Injury Assessment  Goal: Absence of physical injury  Outcome: Progressing  Flowsheets (Taken 8/10/2022 2304 by Annmarie Lyles, RN)  Absence of Physical Injury: Implement safety measures based on patient assessment     Problem: Chronic Conditions and Co-morbidities  Goal: Patient's chronic conditions and co-morbidity symptoms are monitored and maintained or improved  Outcome: Progressing  Flowsheets (Taken 8/10/2022 2015 by Annmarie Lyles, RN)  Care Plan - Patient's Chronic Conditions and Co-Morbidity Symptoms are Monitored and Maintained or Improved:   Monitor and assess patient's chronic conditions and comorbid symptoms for stability, deterioration, or improvement   Collaborate with multidisciplinary team to address chronic and comorbid conditions and prevent exacerbation or deterioration   Update acute care plan with appropriate goals if chronic or comorbid symptoms are exacerbated and prevent overall improvement and discharge     Problem: Nutrition Deficit:  Goal: Optimize nutritional status  Outcome: Progressing     Problem: Safety - Medical Restraint  Goal: Remains free of injury from restraints (Restraint for Interference with Medical Device)  Description: INTERVENTIONS:  1. Determine that other, less restrictive measures have been tried or would not be effective before applying the restraint  2. Evaluate the patient's condition at the time of restraint application  3.  Inform patient/family regarding the reason for restraint  4.  Q2H: Monitor safety, psychosocial status, comfort, nutrition and hydration  Outcome: Progressing

## 2022-08-12 LAB
ANION GAP SERPL CALCULATED.3IONS-SCNC: 9 MMOL/L (ref 3–16)
BASOPHILS ABSOLUTE: 0 K/UL (ref 0–0.2)
BASOPHILS RELATIVE PERCENT: 0.1 %
BUN BLDV-MCNC: 15 MG/DL (ref 7–20)
CALCIUM SERPL-MCNC: 9 MG/DL (ref 8.3–10.6)
CHLORIDE BLD-SCNC: 94 MMOL/L (ref 99–110)
CO2: 33 MMOL/L (ref 21–32)
CREAT SERPL-MCNC: <0.5 MG/DL (ref 0.6–1.2)
EOSINOPHILS ABSOLUTE: 0 K/UL (ref 0–0.6)
EOSINOPHILS RELATIVE PERCENT: 0 %
GFR AFRICAN AMERICAN: >60
GFR NON-AFRICAN AMERICAN: >60
GLUCOSE BLD-MCNC: 134 MG/DL (ref 70–99)
HCT VFR BLD CALC: 27.5 % (ref 36–48)
HEMOGLOBIN: 9 G/DL (ref 12–16)
LYMPHOCYTES ABSOLUTE: 1.8 K/UL (ref 1–5.1)
LYMPHOCYTES RELATIVE PERCENT: 14 %
MAGNESIUM: 2 MG/DL (ref 1.8–2.4)
MCH RBC QN AUTO: 28.8 PG (ref 26–34)
MCHC RBC AUTO-ENTMCNC: 32.6 G/DL (ref 31–36)
MCV RBC AUTO: 88.5 FL (ref 80–100)
MONOCYTES ABSOLUTE: 0.7 K/UL (ref 0–1.3)
MONOCYTES RELATIVE PERCENT: 5.3 %
NEUTROPHILS ABSOLUTE: 10.5 K/UL (ref 1.7–7.7)
NEUTROPHILS RELATIVE PERCENT: 80.6 %
PDW BLD-RTO: 15 % (ref 12.4–15.4)
PHOSPHORUS: 2.9 MG/DL (ref 2.5–4.9)
PLATELET # BLD: 752 K/UL (ref 135–450)
PMV BLD AUTO: 6.9 FL (ref 5–10.5)
POTASSIUM REFLEX MAGNESIUM: 4.1 MMOL/L (ref 3.5–5.1)
RBC # BLD: 3.11 M/UL (ref 4–5.2)
SODIUM BLD-SCNC: 136 MMOL/L (ref 136–145)
WBC # BLD: 13 K/UL (ref 4–11)

## 2022-08-12 PROCEDURE — 2700000000 HC OXYGEN THERAPY PER DAY

## 2022-08-12 PROCEDURE — 6360000002 HC RX W HCPCS: Performed by: INTERNAL MEDICINE

## 2022-08-12 PROCEDURE — 6370000000 HC RX 637 (ALT 250 FOR IP): Performed by: NURSE PRACTITIONER

## 2022-08-12 PROCEDURE — 2580000003 HC RX 258: Performed by: INTERNAL MEDICINE

## 2022-08-12 PROCEDURE — 92526 ORAL FUNCTION THERAPY: CPT

## 2022-08-12 PROCEDURE — 6370000000 HC RX 637 (ALT 250 FOR IP): Performed by: INTERNAL MEDICINE

## 2022-08-12 PROCEDURE — 36415 COLL VENOUS BLD VENIPUNCTURE: CPT

## 2022-08-12 PROCEDURE — 6370000000 HC RX 637 (ALT 250 FOR IP): Performed by: STUDENT IN AN ORGANIZED HEALTH CARE EDUCATION/TRAINING PROGRAM

## 2022-08-12 PROCEDURE — 80048 BASIC METABOLIC PNL TOTAL CA: CPT

## 2022-08-12 PROCEDURE — 85025 COMPLETE CBC W/AUTO DIFF WBC: CPT

## 2022-08-12 PROCEDURE — 94640 AIRWAY INHALATION TREATMENT: CPT

## 2022-08-12 PROCEDURE — 94669 MECHANICAL CHEST WALL OSCILL: CPT

## 2022-08-12 PROCEDURE — 84100 ASSAY OF PHOSPHORUS: CPT

## 2022-08-12 PROCEDURE — 2000000000 HC ICU R&B

## 2022-08-12 PROCEDURE — 99233 SBSQ HOSP IP/OBS HIGH 50: CPT | Performed by: INTERNAL MEDICINE

## 2022-08-12 PROCEDURE — 83735 ASSAY OF MAGNESIUM: CPT

## 2022-08-12 PROCEDURE — 94761 N-INVAS EAR/PLS OXIMETRY MLT: CPT

## 2022-08-12 RX ORDER — GUAIFENESIN/DEXTROMETHORPHAN 100-10MG/5
5 SYRUP ORAL EVERY 4 HOURS PRN
Status: DISCONTINUED | OUTPATIENT
Start: 2022-08-12 | End: 2022-08-16 | Stop reason: HOSPADM

## 2022-08-12 RX ADMIN — IPRATROPIUM BROMIDE AND ALBUTEROL SULFATE 1 AMPULE: 2.5; .5 SOLUTION RESPIRATORY (INHALATION) at 19:55

## 2022-08-12 RX ADMIN — METHYLPREDNISOLONE SODIUM SUCCINATE 40 MG: 40 INJECTION, POWDER, FOR SOLUTION INTRAMUSCULAR; INTRAVENOUS at 07:39

## 2022-08-12 RX ADMIN — NYSTATIN 500000 UNITS: 100000 SUSPENSION ORAL at 19:26

## 2022-08-12 RX ADMIN — DULOXETINE HYDROCHLORIDE 60 MG: 60 CAPSULE, DELAYED RELEASE ORAL at 19:26

## 2022-08-12 RX ADMIN — MEROPENEM 1000 MG: 1 INJECTION, POWDER, FOR SOLUTION INTRAVENOUS at 22:03

## 2022-08-12 RX ADMIN — MOMETASONE FUROATE AND FORMOTEROL FUMARATE DIHYDRATE 2 PUFF: 200; 5 AEROSOL RESPIRATORY (INHALATION) at 07:59

## 2022-08-12 RX ADMIN — GUAIFENESIN SYRUP AND DEXTROMETHORPHAN 5 ML: 100; 10 SYRUP ORAL at 10:08

## 2022-08-12 RX ADMIN — GABAPENTIN 600 MG: 300 CAPSULE ORAL at 07:39

## 2022-08-12 RX ADMIN — TIZANIDINE 4 MG: 4 TABLET ORAL at 19:26

## 2022-08-12 RX ADMIN — SODIUM CHLORIDE SOLN NEBU 3% 15 ML: 3 NEBU SOLN at 15:38

## 2022-08-12 RX ADMIN — ASPIRIN 81 MG: 81 TABLET, CHEWABLE ORAL at 07:39

## 2022-08-12 RX ADMIN — SODIUM CHLORIDE, PRESERVATIVE FREE 10 ML: 5 INJECTION INTRAVENOUS at 19:26

## 2022-08-12 RX ADMIN — ACETAMINOPHEN 650 MG: 325 TABLET ORAL at 07:39

## 2022-08-12 RX ADMIN — GABAPENTIN 600 MG: 300 CAPSULE ORAL at 19:26

## 2022-08-12 RX ADMIN — SODIUM CHLORIDE SOLN NEBU 3% 15 ML: 3 NEBU SOLN at 07:59

## 2022-08-12 RX ADMIN — NYSTATIN 500000 UNITS: 100000 SUSPENSION ORAL at 13:04

## 2022-08-12 RX ADMIN — IPRATROPIUM BROMIDE AND ALBUTEROL SULFATE 1 AMPULE: 2.5; .5 SOLUTION RESPIRATORY (INHALATION) at 07:59

## 2022-08-12 RX ADMIN — MEROPENEM 1000 MG: 1 INJECTION, POWDER, FOR SOLUTION INTRAVENOUS at 13:04

## 2022-08-12 RX ADMIN — DULOXETINE HYDROCHLORIDE 60 MG: 60 CAPSULE, DELAYED RELEASE ORAL at 07:39

## 2022-08-12 RX ADMIN — NYSTATIN 500000 UNITS: 100000 SUSPENSION ORAL at 18:19

## 2022-08-12 RX ADMIN — TOBRAMYCIN 300 MG: 300 SOLUTION RESPIRATORY (INHALATION) at 07:59

## 2022-08-12 RX ADMIN — CLOPIDOGREL BISULFATE 75 MG: 75 TABLET ORAL at 07:39

## 2022-08-12 RX ADMIN — NYSTATIN 500000 UNITS: 100000 SUSPENSION ORAL at 07:39

## 2022-08-12 RX ADMIN — MOMETASONE FUROATE AND FORMOTEROL FUMARATE DIHYDRATE 2 PUFF: 200; 5 AEROSOL RESPIRATORY (INHALATION) at 19:55

## 2022-08-12 RX ADMIN — IPRATROPIUM BROMIDE AND ALBUTEROL SULFATE 1 AMPULE: 2.5; .5 SOLUTION RESPIRATORY (INHALATION) at 11:52

## 2022-08-12 RX ADMIN — TOBRAMYCIN 300 MG: 300 SOLUTION RESPIRATORY (INHALATION) at 19:55

## 2022-08-12 RX ADMIN — MEROPENEM 1000 MG: 1 INJECTION, POWDER, FOR SOLUTION INTRAVENOUS at 05:08

## 2022-08-12 RX ADMIN — PANTOPRAZOLE SODIUM 40 MG: 40 TABLET, DELAYED RELEASE ORAL at 05:11

## 2022-08-12 RX ADMIN — SODIUM CHLORIDE, PRESERVATIVE FREE 10 ML: 5 INJECTION INTRAVENOUS at 07:39

## 2022-08-12 RX ADMIN — IPRATROPIUM BROMIDE AND ALBUTEROL SULFATE 1 AMPULE: 2.5; .5 SOLUTION RESPIRATORY (INHALATION) at 15:38

## 2022-08-12 RX ADMIN — ATORVASTATIN CALCIUM 40 MG: 40 TABLET, FILM COATED ORAL at 19:26

## 2022-08-12 RX ADMIN — SODIUM CHLORIDE SOLN NEBU 3% 15 ML: 3 NEBU SOLN at 11:53

## 2022-08-12 RX ADMIN — SODIUM CHLORIDE SOLN NEBU 3% 15 ML: 3 NEBU SOLN at 19:55

## 2022-08-12 ASSESSMENT — PAIN SCALES - GENERAL
PAINLEVEL_OUTOF10: 3
PAINLEVEL_OUTOF10: 0
PAINLEVEL_OUTOF10: 3
PAINLEVEL_OUTOF10: 3
PAINLEVEL_OUTOF10: 0

## 2022-08-12 NOTE — PROGRESS NOTES
Pulmonary Critical Care Progress Note     Patient's name:  Adan Florian  Medical Record Number: [id number]  Patient's account/billing number: [de-identified]  Patient's YOB: 1945  Age: 78 y.o. Date of Admission: 7/18/2022  5:03 PM  Date of Consult: 8/12/2022      Primary Care Physician: HECTOR Mayberry CNP      Code Status: Limited    Chief complaint: acute respiratory failure with hypoxia    Assessment and Plan     Acute respiratory failure with hypoxia  Necrotizing pseudomonas PNA  Massive Epistaxis s/p Rhinorocket removed  Moderately Severe copd     Plan:  Nebulized duoneb QID and hypertonic saline  Dulera  Acapella  O2 to keep sat > 90%  On ASA and plavix  Steroid x 5 days    Antibiotics per ID, meropenem and Arnie  If stays off Levophed could be transferred to PCU      Overnight:  Sob  Cough  Diff clearing secretions  On 5 L    REVIEW OF SYSTEMS:  Review of Systems -   General ROS: negative  Psychological ROS: negative  Ophthalmic ROS: negative  ENT ROS: mild right epistaxis   Allergy and Immunology ROS: negative  Hematological and Lymphatic ROS: negative  Endocrine ROS: negative  Breast ROS: negative  Respiratory ROS: cough and sob  Cardiovascular ROS: no chest pain or dyspnea on exertion  Gastrointestinal ROS:negative  Genito-Urinary ROS: negative  Musculoskeletal ROS: negative  Neurological ROS: negative  Dermatological ROS: negative        Physical Exam:    Vitals: BP (!) 117/106   Pulse 94   Temp 98.1 °F (36.7 °C) (Oral)   Resp 16   Ht 5' 4\" (1.626 m)   Wt 129 lb 3 oz (58.6 kg)   SpO2 93%   BMI 22.18 kg/m²     Last Body weight:   Wt Readings from Last 3 Encounters:   08/12/22 129 lb 3 oz (58.6 kg)   07/13/22 133 lb 13.1 oz (60.7 kg)   06/20/22 138 lb (62.6 kg)       Body Mass Index : Body mass index is 22.18 kg/m².       Intake and Output summary:   Intake/Output Summary (Last 24 hours) at 8/12/2022 0868  Last data filed at 8/12/2022 0556  Gross per 24 hour Intake 546.03 ml   Output 1250 ml   Net -703.97 ml       Physical Examination:     Gen:  acutely ill appearing, weak   Eyes: PERRL. Anicteric sclera. No conjunctival injection. ENT: No discharge. Posterior oropharynx clear. External appearance of ears and nose normal.  Neck: Trachea midline. No mass   Resp:  Moderate rhonchi and diminished on the right, no wheezing, mild increase wob  CV: Regular rate. Regular rhythm. No murmur or rub. No edema. GI: Soft, Non-tender. Non-distended. +BS  Skin: Warm, dry, w/o erythema. Lymph: No cervical or supraclavicular LAD. M/S: No cyanosis. No clubbing. Neuro:  CN 2-12 tested, no focal neurologic deficit. Moves all extremities  Psych: Awake and alert, Oriented x 3. Judgement and insight appropriate. Mood stable. Laboratory findings:-    CBC:   Recent Labs     08/12/22 0316   WBC 13.0*   HGB 9.0*   *     BMP:    Recent Labs     08/10/22  0610 08/11/22  0756 08/12/22  0316    134* 136   K 4.8 4.8 4.1   CL 97* 93* 94*   CO2 35* 33* 33*   BUN 10 13 15   CREATININE <0.5* <0.5* <0.5*   GLUCOSE 106* 132* 134*     S. Calcium:  Recent Labs     08/12/22  0316   CALCIUM 9.0         Radiology Review:  Pertinent images / reports were reviewed as a part of this visit.   CXR reviewed extensive right airspace disease                     Ben Palmer MD, M.D.            8/12/2022, 8:44 AM

## 2022-08-12 NOTE — PLAN OF CARE
Problem: Discharge Planning  Goal: Discharge to home or other facility with appropriate resources  8/12/2022 0754 by Thony Gupta RN  Outcome: Progressing  Flowsheets (Taken 8/12/2022 0700)  Discharge to home or other facility with appropriate resources: Identify barriers to discharge with patient and caregiver     Problem: Pain  Goal: Verbalizes/displays adequate comfort level or baseline comfort level  8/12/2022 0754 by Thony Gupta RN  Outcome: Progressing  Flowsheets (Taken 8/12/2022 0700)  Verbalizes/displays adequate comfort level or baseline comfort level: Encourage patient to monitor pain and request assistance     Problem: Confusion  Goal: Confusion, delirium, dementia, or psychosis is improved or at baseline  Description: INTERVENTIONS:  1. Assess for possible contributors to thought disturbance, including medications, impaired vision or hearing, underlying metabolic abnormalities, dehydration, psychiatric diagnoses, and notify attending LIP  2. Maurepas high risk fall precautions, as indicated  3. Provide frequent short contacts to provide reality reorientation, refocusing and direction  4. Decrease environmental stimuli, including noise as appropriate  5. Monitor and intervene to maintain adequate nutrition, hydration, elimination, sleep and activity  6. If unable to ensure safety without constant attention obtain sitter and review sitter guidelines with assigned personnel  7.  Initiate Psychosocial CNS and Spiritual Care consult, as indicated  8/12/2022 0754 by Thony Gupta RN  Outcome: Progressing  Flowsheets (Taken 8/12/2022 0700)  Effect of thought disturbance (confusion, delirium, dementia, or psychosis) are managed with adequate functional status: Assess for contributors to thought disturbance, including medications, impaired vision or hearing, underlying metabolic abnormalities, dehydration, psychiatric diagnoses, notify LIP     Problem: Skin/Tissue Integrity  Goal: Absence of new skin breakdown  Description: 1. Monitor for areas of redness and/or skin breakdown  2. Assess vascular access sites hourly  3. Every 4-6 hours minimum:  Change oxygen saturation probe site  4. Every 4-6 hours:  If on nasal continuous positive airway pressure, respiratory therapy assess nares and determine need for appliance change or resting period.   8/12/2022 0754 by Claudean Margarita, RN  Outcome: Progressing     Problem: Safety - Adult  Goal: Free from fall injury  8/12/2022 0754 by Claudean Margarita, RN  Outcome: Progressing     Problem: ABCDS Injury Assessment  Goal: Absence of physical injury  8/12/2022 0754 by Claudean Margarita, RN  Outcome: Progressing     Problem: Chronic Conditions and Co-morbidities  Goal: Patient's chronic conditions and co-morbidity symptoms are monitored and maintained or improved  8/12/2022 0754 by Claudean Margarita, RN  Outcome: Progressing  Flowsheets (Taken 8/12/2022 0700)  Care Plan - Patient's Chronic Conditions and Co-Morbidity Symptoms are Monitored and Maintained or Improved: Monitor and assess patient's chronic conditions and comorbid symptoms for stability, deterioration, or improvement     Problem: Nutrition Deficit:  Goal: Optimize nutritional status  8/12/2022 0754 by Claudean Margarita, RN  Outcome: Progressing

## 2022-08-12 NOTE — CARE COORDINATION
Chart Reviewed. Me twith patient and boyfriend to discuss her plans for DC. She prefers to DC to [facility] as first option and [facility] as second. Informed her that if her oxygen levels stay at 5l or below, she would be able to get to SNF. If however, they do not stay below 5l; she would possibly need LTACH at [facility]. She was familiar with [facility]. She is okay with [facility] as well. Informed her that if [facility] does not have a bed, they may offer her a short stay at their sister facility, [facility] and she is okay with that as well.   [facility]     Case Management   [phone]    8/12/2022  4:49 PM

## 2022-08-12 NOTE — CARE COORDINATION
Rec'd Call from [address], who states they have initiated a prior auth and will look to see if bed is available. Pt is now down to 6L of oxygen this morning. Christian Funes reports that there is also a pending prior auth with [facility]; however, [facility] is out of network. [facility] and [facility] to return calls to determine which one has the bed so the other can remove their prior auth. Spoke with Bedside RN who reports she needed to have Levophed over night and is not off again. Following for DC needs.   Michael Quinn, [place]     Case Management   [phone]    8/12/2022  10:31 AM

## 2022-08-12 NOTE — PROGRESS NOTES
Infectious Disease Follow up Notes  Admit Date: 7/18/2022  Hospital Day: 26    Antibiotics :   IV Meropenem  Inhaled Tobramycin     CHIEF COMPLAINT:      Rt UL lung abscess  Severe Necrotizing PNA  Pseudomonas PNA  Hypoxic resp failure  Epistaxis       Subjective interval History :  78 y. o.woman with a past medical history of COPD, depression, chronic hypoxic respiratory failure now on   4 L of oxygen via nasal cannula, diabetes recent admission for COPD exacerbation and pneumonia diagnosed to have Pseudomonas based on sputum studies. She was on IV cefepime transition to oral levofloxacin on discharge. She was sent to [address] rehab facility and now admitted because of worsening shortness of breath increased sputum production. Sputum culture again on this admission positive for Pseudomonas with some resistance pattern, it is now become resistant to quinolones. CT chest on this admission with progressive pneumonia right upper lobe cavitary lesion possible lung abscess. Extensive consolidation  see images -  This is a new finding on the CT chest compared to her previous CT chest on 6/20/22. Given the necrotizing pneumonia with Pseudomonas infection and lung abscess formation we are consulted for recommendations. Interval History : Remains in ICU , sitting up in the chair IN SOME resp distress+ cough + scant sputum no epistaxis, tolerating antibiotic therapy okay is a plan to go to SNF    Past Medical History:    Past Medical History:   Diagnosis Date    CAD (coronary artery disease) 07/19/2022    NSTEMI, PCI LCx    Chronic pain syndrome     Percocet.  Dr. Ayaan Sim    Colorectal polyps     COPD (chronic obstructive pulmonary disease) ([facility]     CTS (carpal tunnel syndrome)     BILATERAL    DDD (degenerative disc disease), lumbar     Depression     Family hx of colon cancer     Fibromyalgia     Hyperlipemia     IBS (irritable bowel syndrome)     Lumbar spondylosis     New onset type 2 diabetes mellitus ([facility] [address] 02/03/2020    On home O2     4L    Pneumonia 07/2022    P.aer    Urticaria, chronic        Past Surgical History:    Past Surgical History:   Procedure Laterality Date    BRONCHOSCOPY N/A 8/3/2022    BRONCHOSCOPY performed by Bob Cuevas DO at [facility] Bilateral     CATARACT EXTRACTION      CERVICAL POLYP REMOVAL  09/2004    CORONARY ANGIOPLASTY WITH STENT PLACEMENT  07/19/2022    LCx 99. PCI/stent. INTRACAPSULAR CATARACT EXTRACTION Left 06/23/2020    Phacoemulsification with intraocular lens implant performed by Ynes Dunaway MD at [address] Right 07/14/2020    Phacoemulsification with intraocular lens implant performed by Ynes Dunaway MD at [address]      patient denies        Current Medications:    Outpatient Medications Marked as Taking for the 7/18/22 encounter [facility] Encounter)   Medication Sig Dispense Refill    aspirin 81 MG EC tablet Take 1 tablet by mouth in the morning. 30 tablet 3    prasugrel (EFFIENT) 10 MG TABS Take 1 tablet by mouth in the morning. 30 tablet 1       Allergies:  Patient has no known allergies.     Immunizations :   Immunization History   Administered Date(s) Administered    COVID-19, MODERNA BLUE border, Primary or Immunocompromised, (age 12y+), IM, 100 mcg/0.5mL 03/08/2021, 04/05/2021    COVID-19, MODERNA Booster BLUE border, (age 18y+), IM, 50mcg/0.25mL 12/01/2021    Influenza Vaccine, unspecified formulation 01/10/2017    Influenza, High Dose (Fluzone 65 yrs and older) 11/04/2013, 01/21/2016, 09/01/2017, 10/30/2018, 09/18/2020    Influenza, Quadv, adjuvanted, 65 yrs +, IM, PF (Fluad) 09/27/2021    Influenza, Triv, inactivated, subunit, adjuvanted, IM (Fluad 65 yrs and older) 09/13/2019    Pneumococcal Conjugate 13-valent ([facility]) 01/19/2015    Pneumococcal Conjugate Vaccine 01/10/2017    Pneumococcal Polysaccharide ([id number]) 10/12/2012    Tdap (Boostrix, Adacel) 07/17/2007    Zoster Recombinant (Shingrix) 11/21/2019       Social History:     Social History     Tobacco Use    Smoking status: Every Day     Packs/day: 1.00     Years: 55.00     Pack years: 55.00     Types: Cigarettes     Start date: 1/1/1962    Smokeless tobacco: Never    Tobacco comments:     almost ready to quit   Vaping Use    Vaping Use: Never used   Substance Use Topics    Alcohol use: No     Alcohol/week: 0.0 standard drinks    Drug use: No     Social History     Tobacco Use   Smoking Status Every Day    Packs/day: 1.00    Years: 55.00    Pack years: 55.00    Types: Cigarettes    Start date: 1/1/1962   Smokeless Tobacco Never   Tobacco Comments    almost ready to quit      Family History   Problem Relation Age of Onset    Cancer Father         COLON     Alzheimer's Disease Mother     Heart Disease Brother     Heart Failure Brother     Diabetes Daughter     Diabetes Daughter     Diabetes Daughter           REVIEW OF SYSTEMS:      Constitutional:  negative for fevers, chills, night sweats  Eyes:  negative for blurred vision, eye discharge, visual disturbance   HEENT:  negative for hearing loss, ear drainage,nasal congestion  Respiratory:  r cough,+  shortness of breath++ or hemoptysis   Cardiovascular:  negative for chest pain, palpitations, syncope  Gastrointestinal:  negative for nausea, vomiting, diarrhea, constipation, abdominal pain  Genitourinary:  negative for frequency, dysuria, urinary incontinence, hematuria  Hematologic/Lymphatic:  negative for easy bruising, bleeding and lymphadenopathy  Allergic/Immunologic:  negative for recurrent infections, angioedema, anaphylaxis   Endocrine:  negative for weight changes, polyuria, polydipsia and polyphagia  Musculoskeletal:  negative for joint  pain, swelling, decreased range of motion  Neurological:  negative for headaches, slurred speech, unilateral weakness  Psychiatric: negative for hallucinations,confusion,agitation.              PHYSICAL EXAM:      Vitals:    BP (!) 127/91   Pulse 87   Temp 97.9 °F (36.6 °C) (Oral)   Resp 16   Ht 5' 4\" (1.626 m)   Wt 129 lb 3 oz (58.6 kg)   SpO2 95%   BMI 22.18 kg/m²     General Appearance: awake and in some  acute distress, ++  pallor on nasal cannula   Skin: warm and dry, no rash or erythema  Head: normocephalic and atraumatic  Eyes: pupils equal, round, and reactive to light, conjunctivae normal  ENT: tympanic membrane, external ear and ear canal normal bilaterally, nose without deformity, nasal mucosa and turbinates normal without polyps  Neck: supple and non-tender without mass, no thyromegaly  no cervical lymphadenopathy  Pulmonary/Chest:  Rt UL coarse crepts+ +  wheezes, rales or rhonchi, normal air movement, in some respiratory distress  Cardiovascular: normal rate, regular rhythm, normal S1 and S2, no murmurs, rubs, clicks, or gallops, no carotid bruits  Abdomen: soft, non-tender, non-distended, normal bowel sounds, no masses or organomegaly  Extremities: no cyanosis, clubbing or edema  Musculoskeletal: normal range of motion, no joint swelling, deformity or tenderness  Integumentary: No rashes, no abnormal skin lesions, no petechiae  Neurologic: reflexes normal and symmetric, no cranial nerve deficit           Lines: PICC     Data Review:    CBC:   Lab Results   Component Value Date    WBC 13.0 (H) 08/12/2022    HGB 9.0 (L) 08/12/2022    HCT 27.5 (L) 08/12/2022    MCV 88.5 08/12/2022     (H) 08/12/2022     RENAL:   Lab Results   Component Value Date    CREATININE <0.5 (L) 08/12/2022    BUN 15 08/12/2022     08/12/2022    K 4.1 08/12/2022    CL 94 (L) 08/12/2022    CO2 33 (H) 08/12/2022     SED RATE: No results found for: SEDRATE  CK: No results found for: CKTOTAL  CRP: No results found for: CRP  Hepatic Function Panel:   Lab Results   Component Value Date/Time    ALKPHOS 88 07/18/2022 05:20 PM    ALT 65 07/18/2022 05:20 PM    AST 61 07/18/2022 05:20 PM    PROT 5.6 07/18/2022 05:20 PM    PROT 6.9 10/12/2012 11:00 AM    BILITOT 0.5 07/18/2022 05:20 PM    LABALBU 2.4 07/18/2022 05:20 PM     UA:  Lab Results   Component Value Date/Time    COLORU Yellow 07/22/2022 06:31 AM    CLARITYU Clear 07/22/2022 06:31 AM    GLUCOSEU Negative 07/22/2022 06:31 AM    BILIRUBINUR Negative 07/22/2022 06:31 AM    BILIRUBINUR neg 10/17/2019 11:50 AM    KETUA TRACE 07/22/2022 06:31 AM    SPECGRAV 1.049 07/22/2022 06:31 AM    BLOODU Negative 07/22/2022 06:31 AM    PHUR 6.5 07/22/2022 06:31 AM    PROTEINU 30 07/22/2022 06:31 AM    UROBILINOGEN 0.2 07/22/2022 06:31 AM    NITRU Negative 07/22/2022 06:31 AM    LEUKOCYTESUR Negative 07/22/2022 06:31 AM    LABMICR YES 07/22/2022 06:31 AM    URINETYPE NotGiven 07/22/2022 06:31 AM      Urine Microscopic:   Lab Results   Component Value Date/Time    LABCAST 10-20 Hyaline 01/10/2017 06:19 PM    BACTERIA None Seen 07/22/2022 06:31 AM    COMU see below 07/03/2022 03:11 PM    HYALCAST 2 07/22/2022 06:31 AM    WBCUA 2 07/22/2022 06:31 AM    RBCUA 5 07/22/2022 06:31 AM    EPIU 1 07/22/2022 06:31 AM     Urine Reflex to Culture:   Lab Results   Component Value Date/Time    URRFLXCULT Not Indicated 07/03/2022 03:11 PM         MICRO: cultures reviewed and updated by me   Blood Culture:          Culture, Respiratory [[id number]] (Abnormal)  Collected: 07/22/22 1200   Order Status: Completed Specimen: Sputum Expectorated Updated: 07/24/22 0801    CULTURE, RESPIRATORY Rare growth normal respiratory fabiana with Abnormal     Gram Stain Result No Epithelial Cells seen   3+ WBC's (Polymorphonuclear)   No organisms seen     Organism Pseudomonas aeruginosa Abnormal     CULTURE, RESPIRATORY Light growth   Narrative:     ORDER#: [id number]                          ORDERED BY: CIRA KEBEDE   SOURCE: Sputum Expectorated                COLLECTED:  07/22/22 12:00 ANTIBIOTICS AT GURWINDER.:                      RECEIVED :  07/22/22 12:22   Respiratory Culture [[id number]] (Abnormal)  Collected: 07/19/22 2030   Order Status: Completed Specimen: Sputum Expectorated Updated: 07/23/22 0748    CULTURE, RESPIRATORY Light growth normal respiratory fabiana with Abnormal     Gram Stain Result 2+ Gram positive cocci   2+ WBC's (Polymorphonuclear)   1+ Epithelial Cells     Organism Pseudomonas aeruginosa Abnormal     CULTURE, RESPIRATORY Light growth   Narrative:     ORDER#: [id number]                          ORDERED BY: FARZANA GAN   SOURCE: Sputum Expectorated                COLLECTED:  07/19/22 20:30   ANTIBIOTICS AT GURWINDER.:                      RECEIVED :  07/19/22 21:19   Culture, Blood 1 [[id number]] Collected: 07/18/22 1841   Order Status: Completed Specimen: Blood Updated: 07/22/22 2015    Blood Culture, Routine No Growth after 4 days of incubation. Narrative:     ORDER#: [id number]                          ORDERED BY: Bridger Galeana   SOURCE: Blood                              COLLECTED:  07/18/22 18:41   ANTIBIOTICS AT GURWINDER.:                      RECEIVED :  07/18/22 18:56   If child <=2 yrs old please draw pediatric bottle. ~Blood Culture 1   Culture, Blood 2 [[id number]] Collected: 07/18/22 1850   Order Status: Completed Specimen: Blood Updated: 07/22/22 2015    Culture, Blood 2 No Growth after 4 days of incubation. Narrative:     ORDER#: [id number]                          ORDERED BY: Bridger Galeana   SOURCE: Blood                              COLLECTED:  07/18/22 18:50   ANTIBIOTICS AT GURWINDER.:                      RECEIVED :  07/18/22 18:56   If child <=2 yrs old please draw pediatric bottle. ~Blood Culture #2   Strep Pneumoniae Antigen [[id number]] Collected: 07/20/22 1020   Order Status: Completed Specimen: Urine, clean catch Updated: 07/21/22 0841    STREP PNEUMONIAE ANTIGEN, URINE --    Presumptive Negative   Presumptive negative suggests no current or recent pneumococcal infection. Infection due to Strep pneumoniae   cannot be ruled out since the antigen present in the sample   may be below the detection limit of the test.   Normal Range:Presumptive Negative    Narrative:     ORDER#: [id number]                          ORDERED BY: FARZANA GAN   SOURCE: Urine Clean Catch                  COLLECTED:  07/20/22 10:20   ANTIBIOTICS AT GURWINDER.:                      RECEIVED :  07/20/22 10:27   Legionella antigen, urine [[id number]] Collected: 07/20/22 1020   Order Status: Completed Specimen: Urine, catheter Updated: 07/21/22 0840    L. pneumophila Serogp 1 Ur Ag --    Presumptive Negative   No Legionella pneumophila serogroup 1 antigens detected. A negative result does not exclude infection with   Legionella pneumophila serogroup 1 nor does it rule out   other microbial-caused respiratory infections or   disease caused by other serogroups of   Legionella pneumophila. Normal Range: Presumptive Negative    Narrative:     ORDER#: [id number]                          ORDERED BY: FARZANA GAN   SOURCE: Urine Catheter                     COLLECTED:  07/20/22 10:20   ANTIBIOTICS AT GURWINDER.:                      RECEIVED :  07/20/22 10:28   Sputum gram stain [[id number]] Collected: 07/19/22 2030   Order Status: Canceled Specimen: Sputum Expectorated    Rapid influenza A/B antigens [[id number]] Collected: 07/19/22 0210   Order Status: Completed Specimen: Nares Updated: 07/19/22 0245    Rapid Influenza A Ag Negative    Rapid Influenza B Ag Negative   COVID-19, Rapid [[id number]] Collected: 07/18/22 1825   Order Status: Completed Specimen: Nasopharyngeal Swab Updated: 07/18/22 1854    SARS-CoV-2, NAAT Not Detected    Comment: Rapid NAAT:   Negative results should be treated as presumptive and,   if inconsistent with clinical signs and symptoms or necessary for   patient management, should be tested with an alternative molecular   assay.  Negative results do not preclude SARS-CoV-2 infection and   should not be used as the sole basis for patient management decisions. This test has been authorized by the FDA under an Emergency Use   Authorization (EUA) for use by authorized laboratories. Fact sheet for Healthcare Providers:   Maykel.prashanth   Fact sheet for Patients: Maykel.prashanth     METHODOLOGY: Isothermal Nucleic Acid Amplification         CULTURE, RESPIRATORY Rare growth normal respiratory fabiana with Abnormal     Gram Stain Result No Epithelial Cells seen   3+ WBC's (Polymorphonuclear)   No organisms seen    Organism Pseudomonas aeruginosa Abnormal     CULTURE, RESPIRATORY Light growth    Resulting Agency [address] [facility]          Susceptibility      Pseudomonas aeruginosa (1)    Antibiotic Interpretation Microscan  Method Status    cefepime Sensitive 8 mcg/mL BACTERIAL SUSCEPTIBILITY PANEL BY TIERRA     ciprofloxacin Resistant >2 mcg/mL BACTERIAL SUSCEPTIBILITY PANEL BY TIERRA     gentamicin Sensitive <=4 mcg/mL BACTERIAL SUSCEPTIBILITY PANEL BY TIERRA     meropenem Sensitive <=1 mcg/mL BACTERIAL SUSCEPTIBILITY PANEL BY TIERRA     piperacillin-tazobactam Sensitive <=16 mcg/mL BACTERIAL SUSCEPTIBILITY PANEL BY TIERRA     tobramycin Sensitive <=4 mcg/mL BACTERIAL SUSCEPTIBILITY PANEL BY TIERRA          Narrative  Performed by: [facility]  ORDER#: [id number]                          ORDERED BY: CIRA KEBEDE   SOURCE: Sputum Expectorated                COLLECTED:  07/22/22 12:00   ANTIBIOTICS AT GURWINDER.:                      RECEIVED :  07/22/22 12:22           Lab Results   Component Value Date/Time    BC No Growth after 4 days of incubation. 07/18/2022 06:41 PM    BLOODCULT2 No Growth after 4 days of incubation.  07/18/2022 06:50 PM       Respiratory Culture:  Lab Results   Component Value Date/Time    CULTRESP Normal respiratory fabiana 07/30/2022 04:00 PM    LABGRAM  07/30/2022 04:00 PM     3+ WBC's (Mononuclear)  1+ Epithelial Cells  1+ Gram positive cocci       AFB:No results found for: AFBSMEAR  Viral Culture:  Lab Results   Component Value Date/Time    COVID19 Not Detected 08/01/2022 01:55 PM     Urine Culture: No results for input(s): [place] in the last 72 hours. IMAGING:    CT CHEST WO CONTRAST   Final Result   1. Multifocal pneumonia, primary in the right lung, with areas of improvement   since 07/23/2022. Right upper lobe pulmonary abscess is persistent, mildly   decreased with small amount of fluid in the interval.   2. Few foci of airspace disease in the left lower lobe are mildly increased. 3. Persistent small right pleural effusion. 4. Stable mediastinal adenopathy, likely reactive in the setting. Fluoroscopy modified barium swallow with video   Final Result   No evidence of aspiration. Please see separate speech pathology report for full discussion of findings   and recommendations. RECOMMENDATIONS:   Unavailable         XR CHEST PORTABLE   Final Result   Left-sided PICC line tip is in the superior vena cava, the tip is turned   upward superiorly, a change from priors. Moderate right pleural effusion and right lung consolidations similar to   prior study. XR CHEST PORTABLE   Final Result   Increasing opacity throughout portions of the right lung, which can reflect   combination of airspace disease, atelectasis, and pleural effusion. XR CHEST PORTABLE   Final Result   Endotracheal tube with the tip approximately 1 cm above the song. Consider   retraction by approximately 3.5 cm. Enteric tube with the tip and the side   hole below the diaphragm overlying the left upper abdomen, likely within the   fundus of the stomach. Redemonstration of multifocal pneumonia affecting the right lung. Small   right pleural effusion. XR CHEST PORTABLE   Final Result   Unchanged multifocal right-sided pneumonia.          XR CHEST PORTABLE   Final Result   Stable multifocal airspace disease in the right lung with probable upper lobe   abscess. XR CHEST PORTABLE   Final Result   Stable multifocal airspace disease in the right lung, including probable   pulmonary abscess in the right upper lobe. CT CHEST WO CONTRAST   Final Result   1. Mixed consolidative and ground-glass opacities as well as interstitial   thickening throughout the majority of the right lung compatible with   pneumonia. 2. There is a cavity in the right upper lobe demonstrating an air-fluid level   measuring up to 9.7 cm concerning for abscess formation. 3. Trace right pleural effusion. 4. Prominent and enlarged mediastinal lymph nodes, likely reactive. 5. Stable 6 mm left lower lobe pulmonary nodule. RECOMMENDATIONS:   Fleischner Society guidelines for follow-up and management of incidentally   detected pulmonary nodules:      Single Solid Nodule:      Nodule size less than 6 mm   In a low-risk patient, no routine follow-up. In a high-risk patient, optional CT at 12 months. Nodule size equals 6-8 mm   In a low-risk patient, CT at 6-12 months, then consider CT at 18-24 months. In a high-risk patient, CT at 6-12 months, then CT at 18-24 months. Nodule size greater than 8 mm         In a low-risk patient, consider CT at 3 months, PET/CT, or tissue sampling. In a high-risk patient, consider CT at 3 months, PET/CT, or tissue sampling. Multiple Solid Nodules:      Nodule size less than 6 mm   In a low-risk patient, no routine follow-up. In a high-risk patient, optional CT at 12 months. Nodule size equals 6-8 mm   In a low-risk patient, CT at 3-6 months, then consider CT at 18-24 months. In a high-risk patient, CT at 3-6 months, then CT at 18-24 months. Nodule size greater than 8 mm   In a low-risk patient, CT at 3-6 months, then consider CT at 18-24 months. In a high-risk patient, CT at 3-6 months, then CT at 18-24 months.       - Low risk patients include individuals with sodium chloride Stopped (08/11/22 1756)    norepinephrine Stopped (08/12/22 1787)    dextrose         PRN Meds:  guaiFENesin-dextromethorphan, sodium chloride, calcium carbonate, potassium chloride, sodium chloride, sodium chloride flush, acetaminophen, perflutren lipid microspheres, traZODone, ipratropium-albuterol, cetirizine, albuterol sulfate HFA, glucose, dextrose bolus **OR** dextrose bolus, glucagon (rDNA), dextrose, ondansetron, polyethylene glycol      Assessment:     Patient Active Problem List   Diagnosis    Osteopenia-last dexa 2009 repeat 2015 (-2.4 hip)--advised tx    Colon polyp, hyperplastic,-(done 3/11-repeat 3-5 yrs--dr Abdelrahman Al)    Family history of colon cancer    CTS (carpal tunnel syndrome)BILATERAL-s/p surgical repair--resolved     Postmenopausal status-last mammogram 2/15 wnl last pap 12/13--wnl    Nondependent alcohol abuse, in remission    Urticaria, chronic    Smoking    Chronic back pain-(djd) saw dr Maureen Elmore afford surgery--seeing dr Laura Pena for pain meds    Fibromyalgia    Hypercholesteremia    Lumbar spondylosis    Vitamin D deficiency--severe--started 50,000 iu 2x/ wk 11/13    Insomnia--on elevil w help    Constipation--on daily miralax    Depression    Chronic pain syndrome    Muscle cramping    Acute on chronic respiratory failure with hypercapnia (HCC)    ROLY (acute kidney injury) ([address]    Severe sepsis (HCC)    Gastroesophageal reflux disease    COPD, severe ([address]    Chronic hypoxemic respiratory failure ([address]    Degeneration of lumbar or lumbosacral intervertebral disc    Trochanteric bursitis of right hip    COPD exacerbation ([address]    Diabetes ([address]    Controlled type 2 diabetes mellitus without complication, without long-term current use of insulin ([address]    Age-related osteoporosis without current pathological fracture- started alendronate 9/2021    Pulmonary nodule seen on imaging study    Pneumonia of right lung due to infectious organism    General weakness    Elevated lactic acid level    Cavitary pneumonia    Chronic obstructive pulmonary disease (HCC)    Acute respiratory failure with hypoxia (HCC)    Acute on chronic respiratory failure with hypoxia and hypercapnia (HCC)    Abnormal EKG    NSTEMI (non-ST elevated myocardial infarction) (HCC)    Necrotizing pneumonia (HCC)    Multifocal pneumonia    Pseudomonas respiratory infection    Abnormal CT of the chest    Peripherally inserted central catheter (PICC) in place    Massive hemoptysis    Epistaxis    Acute respiratory insufficiency    Acute blood loss anemia    Hypotension due to drugs     Severe necrotizing Pseudomonas pneumonia  Right upper lobe cavitary lesion  Right right lung evolving abscess  Right lung dense consolidation of  Pseudomonas pneumonia developing some resistance  COPD exacerbation  Hypoxic respiratory failure  Coronary artery disease  Status post cardiac cath  Chronic smoking  Abnormal CT chest  BNP elevation  COVID-19 negative  Epistaxis severe vs Hemoptysis now intubated sedated on the ventilator for airway protection 8/3/22   Now extubated to Venturi mask  Rhino Rocket removed by ENT      Unfortunately she developed progressive changes with dense consolidation and lung abscess secondary to Pseudomonas pneumonia. Pseudomonas appears to have developed resistance to quinolones while on therapy this is concerning.   CT chest on this admission grossly abnormal and definitely there is progressive changes given the severity of the -pneumonia will need IV antibiotics     OPAT d/w pt she needs to QUIT smoking d/w pt and her family        Given the lung findings will have to continue antibiotic therapy anticipate least 2 more weeks of duration of treatment    repeat sputum testing to see if Pseudomonas is clearing out on the current therapy-sputum repeat Normal fabiana noted      transferred to ICU secondary to massive epistaxis versus was  intubated on the ventilator status post bronchoscopy      Extubated on 8/4/22 and on Venturi mask has Rhinorocket in place ENT notes reviewed      Still has mild epistaxis noted better controlled ENT is following closely and the current antibiotics will provide sinus coverage     May repeat CT chest before d/c is she stays longer as in patient     Rhino Rocket removed by ENT today no further epistaxis noted tolerating antibiotic therapy okay    She is still sob and CXR Rt side still worse and given her age and severe infection risk for progression -     Repeat CT chest completed results  noted images reviewed and still with dense consolidation and  to continue IV antibiotic  Until 8/30    Labs, Microbiology, Radiology and all the pertinent results from current hospitalization and  care every where were reviewed  by me as a part of the evaluation   Plan:   Cont  IV Meropenem 1 gm q 8 HR X stop date  x  8/30  Will change to Q 12 hr DOSING AT ad/c if SNF unable to do Q 8hr dosing for dc planning   Cont INH - Tobramycin for now Q 12 hrs  4 . Picc line in place   5 QUIT smoking  6. Will repeat CT chest as in patient if she stays longer in house  7 Cont supportive care  8. CT images reviewed see above    9. Risk for progression  10 on  Venturi mask  11. Repeat sputum cleared   12. Robby DONE   13, ENT following for massive Epistaxis - controlled with Rhino rocket - Rhino Rocket removed   14. CXR not improving much is of concern risk for intubation -  15 CT CHEST reviewed ongoing right upper lobe cavitary lesion with pneumonia consolidation seems to be slowly improving        Discussed with patient/Family and Nursing   Risk of Complications/Morbidity: High      Illness(es)/ Infection present that pose threat to bodily function. There is potential for severe exacerbation of infection/side effects of treatment. Therapy requires intensive monitoring for antimicrobial agent toxicity.    Thanks for allowing me to participate in your patient's care and please call me with any questions or concerns.     Maximiliano Ponce MD  Infectious Disease  [facility] ([facility]) Physician  Phone: [phone]   Fax : [phone]

## 2022-08-12 NOTE — PROGRESS NOTES
Speech Language Pathology  [facility]   Speech Therapy  Daily Dysphagia Treatment Note    Aurora Mc  AGE: 78 y.o.    GENDER: female  : 1945  [id number]  EPISODE DATE:  2022    Patient Active Problem List   Diagnosis    Osteopenia-last dexa  repeat  (-2.4 hip)--advised tx    Colon polyp, hyperplastic,-(done 3/11-repeat 3-5 yrs--dr Jarvis Shin)    Family history of colon cancer    CTS (carpal tunnel syndrome)BILATERAL-s/p surgical repair--resolved     Postmenopausal status-last mammogram 2/15 wnl last pap --wnl    Nondependent alcohol abuse, in remission    Urticaria, chronic    Smoking    Chronic back pain-(djd) saw dr Nora Corral afford surgery--seeing dr Andra Romeo for pain meds    Fibromyalgia    Hypercholesteremia    Lumbar spondylosis    Vitamin D deficiency--severe--started 50,000 iu 2x/ wk     Insomnia--on elevil w help    Constipation--on daily miralax    Depression    Chronic pain syndrome    Muscle cramping    Acute on chronic respiratory failure with hypercapnia ([address]    ROLY (acute kidney injury) ([address]    Severe sepsis (HCC)    Gastroesophageal reflux disease    COPD, severe ([address]    Chronic hypoxemic respiratory failure ([address]    Degeneration of lumbar or lumbosacral intervertebral disc    Trochanteric bursitis of right hip    COPD exacerbation ([address]    Diabetes ([address]    Controlled type 2 diabetes mellitus without complication, without long-term current use of insulin ([address]    Age-related osteoporosis without current pathological fracture- started alendronate 2021    Pulmonary nodule seen on imaging study    Pneumonia of right lung due to infectious organism    General weakness    Elevated lactic acid level    Cavitary pneumonia    Chronic obstructive pulmonary disease (HCC)    Acute respiratory failure with hypoxia (HCC)    Acute on chronic respiratory failure with hypoxia and hypercapnia (HCC)    Abnormal EKG    NSTEMI (non-ST elevated myocardial infarction) [facility]    Necrotizing pneumonia (HCC)    Multifocal pneumonia    Pseudomonas respiratory infection    Abnormal CT of the chest    Peripherally inserted central catheter (PICC) in place    Massive hemoptysis    Epistaxis    Acute respiratory insufficiency    Acute blood loss anemia    Hypotension due to drugs     No Known Allergies    Treatment Diagnosis: Dysphagia     CHART REVIEW:  7/18/2022 admitted with c/o SOB and increased O2 demand  MD ADMISSION H&P HPI: Pt is an 78y.o. year-old female with a history of hyperlipidemia, fibromyalgia, chronic pain syndrome, severe COPD with chronic hypoxic respiratory failure requiring oxygen at 4 L/min via nasal cannula continuously. She presents to the emergency room for evaluation following a 2 to 3-day history of worsening shortness of breath. She was recently treated for Pseudomonas pneumonia and discharged to rehab. EMS reports that she was on a very long oxygen tube when they arrived. She was placed on 6 L nasal cannula and her oxygen saturation improved. In the emergency room she was found to have an abnormal EKG with inverted T waves in V2 through V5. Cardiology was consulted and asked that she be put on a heparin drip. Patient denies any current or recent chest pain. She is being admitted for further evaluation and treatment. Associated signs and symptoms do not include chest pain, lightheaded, dizziness, diaphoresis, edema, orthopnea, paroxysmal nocturnal dyspnea, fever or chills. No recent medication changes    7/27/2022 rapid response: txfer to ICU , placed on BiPAP. PNA progressive and persistent, with cavitary/necrotizing component, R side    8/3/2022: txfer back to ICU, hemoptysis actually found to be massive epistaxis. Intubated. Pulmonology and ENT consulted, rhino rocket placed. 8/4/2022: extubated    IMAGING:  CT CHEST: 7/23/2022  Impression   1.  Mixed consolidative and ground-glass opacities as well as interstitial   thickening throughout the majority of the right lung compatible with   pneumonia. 2. There is a cavity in the right upper lobe demonstrating an air-fluid level   measuring up to 9.7 cm concerning for abscess formation. 3. Trace right pleural effusion. 4. Prominent and enlarged mediastinal lymph nodes, likely reactive. 5. Stable 6 mm left lower lobe pulmonary nodule. Most recent CXR: 8/7/2022  Impression   Increasing opacity throughout portions of the right lung, which can reflect   combination of airspace disease, atelectasis, and pleural effusion. MBS: 8/9/2022  Moderate oral stage dysphagia characterized by decreased mastication and decreased lingual manipulation. Prolonged bolus formation and movement with all. Concern for excess labor/effort with regular solids. Patient maintains good oral control without premature loss to the pharynx. Mild pharyngeal stage dysphagia characterized by delayed swallow, decreased laryngeal elevation, and decreased pharyngeal peristalsis. No penetration/aspiration. No significant residue. Of note, patient met in fluoroscopy suite with reported increased O2 demand prior to MBS. Proceeded with MBS d/t med team concern for potential for silent aspiration. No penetration/aspiration but patient does appear at high risk for compromised/diminished resp status with exertion with PO; recommend PO only as tolerated with frequent rest breaks PRN. Subjective:     Current Diet Level:  Easy to chew ;  Thin liquids      Comments regarding tolerating Current Diet:   RN reports adequate tolerance  6L O2   RN reports continued c/o acid reflux    Objective:   Pain: Did not state               Vision: [x]WFL []Impaired:  Hearing: [x]WFL []Impaired:     Cognitive/behavioral/communication:   Oriented to [x] self [x] place [x] date [x] situation  [x]alert []lethargic  [x]cooperative []self-limiting   []confused   []distractible []agitated []impulsive  [x]verbally responsive []nonverbal []limited verbal responses  []follows one step commands []does not follow dx [x]follows complex commands  []aphasic []dysarthric  [] other:     Respiratory Status: []Room Air [x]O2 via nasal cannula []Other  Dentition: []Adequate [x]Dentures []Missing Many Teeth []Edentulous []Other:    Patient Positioning: Upright in bed     PO Trials: Thin Liquids: suspect premature bolus loss to the pharynx; delayed swallow; decreased laryngeal elevation; NO overt signs/symptoms of penetration/aspiration  Nectar thick liquids: DNT  Honey Thick liquids: DNT: DNT  Puree: DNT   Soft food: DNT  Regular food: prolonged but adequate bolus formation and movement; suspect premature bolus loss to the pharynx; delayed swallow; decreased laryngeal elevation; NO overt signs/symptoms of penetration/aspiration    Dysphagia Tx:   Direct Dysphagia tx: appears appropriate to consider upgrade to regular; improved mandibular ROM with NC  Dysphagia ex: NA  Training in compensatory strategies: independent  Pt response to ex/training: agreeable and cooperative; good insight/recall for new dysphagia learning    Goals: The patient will tolerate recommended diet without observed clinical signs of aspiration; continue  The patient/caregiver will demonstrate understanding of compensatory strategies for improved swallowing safety. ; continue  The patient will tolerate regular foods 10/10.; continue  The patient will improve oral motor function via therapeutic oral motor exercises to 5/5 each trial.; not addressed - resolving needs  The patient will improve oral preparation phase via bolus control/manipulation exercises to 5/5 each trial.  not addressed - resolving needs    Assessment:   Impressions:   Pt alert, pleasant and cooperative; improving endurance and decreasing generalized weakness. Pt is oriented x4.   She follows commands and is verbally responsive   Oral and pharyngeal dysphagia characterized by prolonged but adequate bolus formation and movement with regular solids; suspect premature bolus loss to the pharynx; delayed swallow initiation; and decreased laryngeal elevation. NO overt signs/symptoms of penetration/aspiration this date. Recommend consider upgrade to regular diet with thin liquids  ST to follow up x1 to confirm tolerance and no additional skilled ST needs. Recommended Diet and Intervention 8/12/2022:  Solids: IDDSI 7 Regular Solids  Liquids: IDDSI 0 Thin Liquids   Meds: Meds PO as tolerated    Compensatory Swallowing Strategies:  Upright as possible with all PO intake , Small bites/sips , Remain upright 30-45 min     EDUCATION:   Provided education regarding role of SLP, results of assessment, recommendations and general speech pathology plan of care. [x] Pt verbalized understanding and agreement   [] Pt requires ongoing learning   [] No evidence of comprehension     Dysphagia Prognosis: [x] good []fair []poor []Guarded        Plan:     Continue Dysphagia Therapy: YES    Interventions: Bolus Control Exercise, Diet Tolerance Monitoring , Patient/Family Education , Therapeutic Trials with SLP   Duration/Frequency of therapy while on unit: Speech therapy for dysphagia tx 3-5 times per week during acute care stay. Discharge Instructions:   Discharge recommendations to be determined pending ongoing follow-up during acute care stay    This note serves as a D/C Summary in the event that this patient is discharged prior to the next therapy session. Coded treatment time: 0  Total treatment time: [address]  Kaylah Sweeney, [id number] [facility] [id number]  Speech-Language Pathologist  Portable phone: [phone]  On 08/12/22 at 11:55 AM

## 2022-08-12 NOTE — PLAN OF CARE
disturbance, including medications, impaired vision or hearing, underlying metabolic abnormalities, dehydration, psychiatric diagnoses, and notify attending LIP  2. [place] high risk fall precautions, as indicated  3. Provide frequent short contacts to provide reality reorientation, refocusing and direction  4. Decrease environmental stimuli, including noise as appropriate  5. Monitor and intervene to maintain adequate nutrition, hydration, elimination, sleep and activity  6. If unable to ensure safety without constant attention obtain sitter and review sitter guidelines with assigned personnel  7. Initiate Psychosocial CNS and Spiritual Care consult, as indicated  8/11/2022 2146 by Charlotte Gomez RN  Outcome: Progressing  Flowsheets (Taken 8/11/2022 2000)  Effect of thought disturbance (confusion, delirium, dementia, or psychosis) are managed with adequate functional status:   Assess for contributors to thought disturbance, including medications, impaired vision or hearing, underlying metabolic abnormalities, dehydration, psychiatric diagnoses, notify [facility] high risk fall precautions, as indicated   Provide frequent short contacts to provide reality reorientation, refocusing and direction   Decrease environmental stimuli, including noise as appropriate  8/11/2022 0811 by Li Licona RN  Outcome: Progressing     Problem: Skin/Tissue Integrity  Goal: Absence of new skin breakdown  Description: 1. Monitor for areas of redness and/or skin breakdown  2. Assess vascular access sites hourly  3. Every 4-6 hours minimum:  Change oxygen saturation probe site  4. Every 4-6 hours:  If on nasal continuous positive airway pressure, respiratory therapy assess nares and determine need for appliance change or resting period.   8/11/2022 2146 by Charlotte Gomez RN  Outcome: Progressing  8/11/2022 0811 by Li Licona RN  Outcome: Progressing     Problem: Safety - Adult  Goal: Free from fall injury  8/11/2022 2146 by [place] Putt, RN  Outcome: Progressing  8/11/2022 0811 by Vipul Santillan RN  Outcome: Progressing  Flowsheets (Taken 8/10/2022 2304 by Fuad Florian RN)  Free From Fall Injury: Instruct family/caregiver on patient safety     Problem: ABCDS Injury Assessment  Goal: Absence of physical injury  8/11/2022 2146 by Darby Rodríguez RN  Outcome: Progressing  8/11/2022 0811 by Vipul Santillan RN  Outcome: Progressing  Flowsheets (Taken 8/10/2022 2304 by Fuad Florian RN)  Absence of Physical Injury: Implement safety measures based on patient assessment     Problem: Chronic Conditions and Co-morbidities  Goal: Patient's chronic conditions and co-morbidity symptoms are monitored and maintained or improved  8/11/2022 2146 by Darby Rodríguez RN  Outcome: Progressing  Flowsheets (Taken 8/11/2022 2000)  Care Plan - Patient's Chronic Conditions and Co-Morbidity Symptoms are Monitored and Maintained or Improved: Monitor and assess patient's chronic conditions and comorbid symptoms for stability, deterioration, or improvement  8/11/2022 0811 by Vipul Santillan RN  Outcome: Progressing  Flowsheets (Taken 8/10/2022 2015 by Fuad Florian RN)  Care Plan - Patient's Chronic Conditions and Co-Morbidity Symptoms are Monitored and Maintained or Improved:   Monitor and assess patient's chronic conditions and comorbid symptoms for stability, deterioration, or improvement   Collaborate with multidisciplinary team to address chronic and comorbid conditions and prevent exacerbation or deterioration   Update acute care plan with appropriate goals if chronic or comorbid symptoms are exacerbated and prevent overall improvement and discharge     Problem: Nutrition Deficit:  Goal: Optimize nutritional status  8/11/2022 2146 by Darby Rodríguez RN  Outcome: Progressing  8/11/2022 0811 by Vipul Santillan RN  Outcome: Progressing     Problem: Safety - Medical Restraint  Goal: Remains free of injury from restraints (Restraint for Interference with Medical Device)  Description: INTERVENTIONS:  1. Determine that other, less restrictive measures have been tried or would not be effective before applying the restraint  2. Evaluate the patient's condition at the time of restraint application  3. Inform patient/family regarding the reason for restraint  4.  Q2H: Monitor safety, psychosocial status, comfort, nutrition and hydration  8/11/2022 2146 by Henny Clayton RN  Outcome: Progressing  8/11/2022 0811 by Liliam Haro RN  Outcome: Progressing

## 2022-08-12 NOTE — CARE COORDINATION
Call rec'd from [facility] [phone] from [address] [place] [address] Dennys who reports due to pt being out of network with [facility], she would have a large financial liability to go there. They could apply for a [place] Waiver but it is unknown what her copay would be. Currently, it would be $10, 000. She will remove her prior auth that they started. Call placed to [facility] [place] to request they continue with their prior auth even though they do not have any beds until next week sometime. Currently, pt is at 6l of oxygen. Call placed back to Kaitlynn Barnhart Pt to review. [phone]. Informed her we are reviewing for [facility] but she is also progressing that she could be SNF appropriate. She will review the chart again and she will determine if they can accept her at [facility] and if not, will look to accept her at [facility] and then transfer to [place] [id number] pt when bed is available.

## 2022-08-12 NOTE — CARE COORDINATION
[facility] has no beds today nor over the weekend. Waiting to hear from [facility].   [place], [place]     Case Management   [phone]    8/12/2022  10:35 AM

## 2022-08-12 NOTE — PROGRESS NOTES
Hospitalist Progress Note      PCP: Brynn Shaw, APRN - CNP    Date of Admission: 7/18/2022    Chief Complaint: respiratory distress    Hospital Course:    Pt is an 78y.o. year-old female with a history of hyperlipidemia, fibromyalgia, chronic pain syndrome, severe COPD with chronic hypoxic respiratory failure requiring oxygen at 4 L/min via nasal cannula continuously. She presents to the emergency room for evaluation following a 2 to 3-day history of worsening shortness of breath. She was recently treated for Pseudomonas pneumonia and discharged to rehab. EMS reports that she was on a very long oxygen tube when they arrived. She was placed on 6 L nasal cannula and her oxygen saturation improved. In the emergency room she was found to have an abnormal EKG with inverted T waves in V2 through V5. Cardiology was consulted and asked that she be put on a heparin drip. Patient denies any current or recent chest pain. She is being admitted for further evaluation and treatment. Associated signs and symptoms do not include chest pain, lightheaded, dizziness, diaphoresis, edema, orthopnea, paroxysmal nocturnal dyspnea, fever or chills. No recent medication changes. 8/10  Patient continues to require oxygen per nonrebreather, 12 L. Intermittent need of vasopressors. 8/11  Patient appears to be doing well, tolerating nasal cannula, 10 L. LTAC placement is pending, due to significant O2 requirements unable to go to regular nursing facility. 8/12  Appears to be dong better, saturating well on 6 L NC. Diet upgraded to a regular diet with supplements.  She is still requiring levophed; therefore, unable to go to floor      Subjective:  as above    Medications:  Reviewed    Infusion Medications    sodium chloride Stopped (08/11/22 8549)    norepinephrine Stopped (08/12/22 6066)    dextrose       Scheduled Medications    metoprolol succinate  12.5 mg Oral Daily    tobramycin (PF)  300 mg Nebulization BID    clopidogrel  75 mg Oral Daily    methylPREDNISolone  40 mg IntraVENous Daily    pantoprazole  40 mg Oral QAM AC    ipratropium-albuterol  1 ampule Inhalation 4x daily    aspirin  325 mg Oral Once    aspirin  81 mg Oral Daily    sodium chloride (Inhalant)  15 mL Nebulization Q4H While awake    [Held by provider] enoxaparin  40 mg SubCUTAneous Daily    meropenem  1,000 mg IntraVENous Q8H    nystatin  5 mL Oral 4x Daily    sodium chloride flush  5-40 mL IntraVENous 2 times per day    atorvastatin  40 mg Oral Nightly    tiZANidine  4 mg Oral Nightly    gabapentin  600 mg Oral BID    DULoxetine  60 mg Oral BID    mometasone-formoterol  2 puff Inhalation BID     PRN Meds: guaiFENesin-dextromethorphan, sodium chloride, calcium carbonate, potassium chloride, sodium chloride, sodium chloride flush, acetaminophen, perflutren lipid microspheres, traZODone, ipratropium-albuterol, cetirizine, albuterol sulfate HFA, glucose, dextrose bolus **OR** dextrose bolus, glucagon (rDNA), dextrose, ondansetron, polyethylene glycol      Intake/Output Summary (Last 24 hours) at 8/12/2022 1202  Last data filed at 8/12/2022 0556  Gross per 24 hour   Intake 546.03 ml   Output 1250 ml   Net -703.97 ml         Exam:    BP (!) 127/91   Pulse 93   Temp 97.9 °F (36.6 °C) (Oral)   Resp 19   Ht 5' 4\" (1.626 m)   Wt 129 lb 3 oz (58.6 kg)   SpO2 95%   BMI 22.18 kg/m²     General appearance: Anxious+, appears stated age and cooperative. HEENT: Pupils equal, round, and reactive to light. Conjunctivae/corneas clear. Neck: Supple, with full range of motion. No jugular venous distention. Trachea midline. Respiratory:  Comfortably on Bipap. Crackles bibasally. Cardiovascular: Regular rate and rhythm with normal S1/S2 without murmurs, rubs or gallops. Abdomen: Soft, non-tender, non-distended with normal bowel sounds. Musculoskeletal: No clubbing, cyanosis or edema bilaterally. Full range of motion without deformity.   Skin: Skin color, texture, turgor normal.  No rashes or lesions. Neurologic:  Neurovascularly intact without any focal sensory/motor deficits. Cranial nerves: II-XII intact, grossly non-focal.  Psychiatric: Alert and oriented, thought content appropriate, normal insight  Capillary Refill: Brisk,< 3 seconds   Peripheral Pulses: +2 palpable, equal bilaterally       Labs:   Recent Labs     08/10/22  0610 08/11/22  0756 08/12/22  0316   WBC 9.0 9.8 13.0*   HGB 7.5* 8.9* 9.0*   HCT 22.0* 26.0* 27.5*   * 599* 752*       Recent Labs     08/10/22  0610 08/11/22  0756 08/12/22  0316    134* 136   K 4.8 4.8 4.1   CL 97* 93* 94*   CO2 35* 33* 33*   BUN 10 13 15   CREATININE <0.5* <0.5* <0.5*   CALCIUM 8.8 9.3 9.0   PHOS 4.2 4.0 2.9       No results for input(s): AST, ALT, BILIDIR, BILITOT, ALKPHOS in the last 72 hours. No results for input(s): INR in the last 72 hours. No results for input(s): Nicolasa Risk in the last 72 hours. Urinalysis:      Lab Results   Component Value Date/Time    NITRU Negative 07/22/2022 06:31 AM    WBCUA 2 07/22/2022 06:31 AM    BACTERIA None Seen 07/22/2022 06:31 AM    RBCUA 5 07/22/2022 06:31 AM    BLOODU Negative 07/22/2022 06:31 AM    SPECGRAV 1.049 07/22/2022 06:31 AM    GLUCOSEU Negative 07/22/2022 06:31 AM       Radiology:  CT CHEST WO CONTRAST   Final Result   1. Multifocal pneumonia, primary in the right lung, with areas of improvement   since 07/23/2022. Right upper lobe pulmonary abscess is persistent, mildly   decreased with small amount of fluid in the interval.   2. Few foci of airspace disease in the left lower lobe are mildly increased. 3. Persistent small right pleural effusion. 4. Stable mediastinal adenopathy, likely reactive in the setting. Fluoroscopy modified barium swallow with video   Final Result   No evidence of aspiration. Please see separate speech pathology report for full discussion of findings   and recommendations. RECOMMENDATIONS:   Unavailable         XR CHEST PORTABLE   Final Result   Left-sided PICC line tip is in the superior vena cava, the tip is turned   upward superiorly, a change from priors. Moderate right pleural effusion and right lung consolidations similar to   prior study. XR CHEST PORTABLE   Final Result   Increasing opacity throughout portions of the right lung, which can reflect   combination of airspace disease, atelectasis, and pleural effusion. XR CHEST PORTABLE   Final Result   Endotracheal tube with the tip approximately 1 cm above the song. Consider   retraction by approximately 3.5 cm. Enteric tube with the tip and the side   hole below the diaphragm overlying the left upper abdomen, likely within the   fundus of the stomach. Redemonstration of multifocal pneumonia affecting the right lung. Small   right pleural effusion. XR CHEST PORTABLE   Final Result   Unchanged multifocal right-sided pneumonia. XR CHEST PORTABLE   Final Result   Stable multifocal airspace disease in the right lung with probable upper lobe   abscess. XR CHEST PORTABLE   Final Result   Stable multifocal airspace disease in the right lung, including probable   pulmonary abscess in the right upper lobe. CT CHEST WO CONTRAST   Final Result   1. Mixed consolidative and ground-glass opacities as well as interstitial   thickening throughout the majority of the right lung compatible with   pneumonia. 2. There is a cavity in the right upper lobe demonstrating an air-fluid level   measuring up to 9.7 cm concerning for abscess formation. 3. Trace right pleural effusion. 4. Prominent and enlarged mediastinal lymph nodes, likely reactive. 5. Stable 6 mm left lower lobe pulmonary nodule.       RECOMMENDATIONS:   Fleischner Society guidelines for follow-up and management of incidentally   detected pulmonary nodules:      Single Solid Nodule:      Nodule size less than 6 mm In a low-risk patient, no routine follow-up. In a high-risk patient, optional CT at 12 months. Nodule size equals 6-8 mm   In a low-risk patient, CT at 6-12 months, then consider CT at 18-24 months. In a high-risk patient, CT at 6-12 months, then CT at 18-24 months. Nodule size greater than 8 mm         In a low-risk patient, consider CT at 3 months, PET/CT, or tissue sampling. In a high-risk patient, consider CT at 3 months, PET/CT, or tissue sampling. Multiple Solid Nodules:      Nodule size less than 6 mm   In a low-risk patient, no routine follow-up. In a high-risk patient, optional CT at 12 months. Nodule size equals 6-8 mm   In a low-risk patient, CT at 3-6 months, then consider CT at 18-24 months. In a high-risk patient, CT at 3-6 months, then CT at 18-24 months. Nodule size greater than 8 mm   In a low-risk patient, CT at 3-6 months, then consider CT at 18-24 months. In a high-risk patient, CT at 3-6 months, then CT at 18-24 months. - Low risk patients include individuals with minimal or absent history of   smoking and other known risk factors. - High risk patients include individuals with a history or smoking or known   risk factors. Radiology 2017 http://pubs. rsna.org/doi/full/10.1148/radiol. 8439659776         XR CHEST PORTABLE   Final Result   1. Increased pneumonia throughout the right lung remaining most prominent in   the right upper lobe. 2. Emphysema better demonstrated on CT. 3. Possible trace right pleural effusion.          XR CHEST PORTABLE   Final Result   Improved aeration of the right chest with persistent consolidation in the mid   to upper right chest.         CT CHEST WO CONTRAST    (Results Pending)           Assessment/Plan:    Active Hospital Problems    Diagnosis Date Noted    Acute respiratory insufficiency [R06.89] 08/04/2022     Priority: Medium    Acute blood loss anemia [D62] 08/04/2022     Priority: Medium    Hypotension due to drugs [I95.2] 08/04/2022     Priority: Medium    Massive hemoptysis [R04.2] 08/03/2022     Priority: Medium    Epistaxis [R04.0] 08/03/2022     Priority: Medium    Peripherally inserted central catheter (PICC) in place [Z45.2] 07/31/2022     Priority: Medium    NSTEMI (non-ST elevated myocardial infarction) ([facility] [I21.4] 07/26/2022     Priority: Medium    Necrotizing pneumonia ([facility] [J85.0] 07/26/2022     Priority: Medium    Multifocal pneumonia [J18.9] 07/26/2022     Priority: Medium    Pseudomonas respiratory infection [J98.8, B96.5] 07/26/2022     Priority: Medium    Abnormal CT of the chest [R93.89] 07/26/2022     Priority: Medium    Acute on chronic respiratory failure with hypoxia and hypercapnia (HCC) [J96.21, J96.22] 07/18/2022     Priority: Medium    Abnormal EKG [R94.31] 07/18/2022     Priority: Medium    Chronic obstructive pulmonary disease ([facility] [J44.9]      Priority: Medium    Cavitary pneumonia [J18.9, J98.4]      Priority: Medium    Pneumonia of right lung due to infectious organism [J18.9] 07/03/2022     Priority: Medium    COPD, severe ([facility] [J44.9] 02/24/2017    Chronic pain syndrome [G89.4] 03/14/2016    Hypercholesteremia [E78.00] 11/04/2013    Fibromyalgia [M79.7]     Smoking [F17.200] 08/27/2011     Acute on chronic hypoxic respiratory failure   -Now on nasal cannula 6 L  -Try to wean O2 down to regular nasal cannula tolerated be discharged home  -Solu-Medrol started, for 5 days    2. 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 in situ   -CT surgery consulted:  would need pneumonectomy for which she is a poor candidate --> medical therapy only  - Respiratory sputum culture repeated on 7/30 without pseudomonnas  - Repeat CT chest on 8/11    Multifocal pneumonia, primary in the right lung, with areas of improvement   since 07/23/2022.   Right upper lobe pulmonary abscess is persistent, mildly   decreased with small amount of fluid in the interval.         3. Epistaxis- resolved. Right nare Rhinorocket Dcd.  -DC lovenox.  -Keep ASA and prasugrel going  -HnH qd  -Transfuse PRBCs if hb is below 7.0  -Transfer out of ICU in next 24hrs        4. Critical coronary artery disease single-vessel  Status post PCI to left circumflex by Dr Yandel Pina on this admission  with stent  Now on antiplatelet therapy, aspirin and Plavix     5. Anemia, AOCD. Now has hemoptysis. FU iron studies TIBC ferritin % and reticulocyte count  No indication to transfusion yet       6. COPD, severe ([address] - without acute exacerbation. Will provide Nebulizer treatments as needed and continue home medications. Patient will be monitored closely, and deep breathing and coughing will be encouraged while awake. 7. Hyperlipidemia - No current evidence of Rhabdomyolysis or other adverse effects. Continue statin therapy while in the hospital     8. Chronic pain syndrome -continue her home pain med regimen     9. Fibromyalgia - provide supportive care    DVT Prophylaxis: heparin  Diet: ADULT ORAL NUTRITION SUPPLEMENT; Lunch, Breakfast, Dinner; Standard High Calorie/High Protein Oral Supplement  ADULT DIET; Regular  Code Status: Limited    PT/OT Eval Status: evaluating    Dispo; Transfer out of ICU.  Can be Dcd in am.    Tobin Vergara MD

## 2022-08-12 NOTE — PROGRESS NOTES
Comprehensive Nutrition Assessment    Type and Reason for Visit:  Reassess    Nutrition Recommendations/Plan:   Continue Easy to Chew diet per SLP. D/C magic cup. Start Ensure TID. Pt would benefit from alternative nutrition given ongoing poor PO intakes. Malnutrition Assessment:  Malnutrition Status: At risk for malnutrition (Comment) (07/20/22 1218)    Context:  Chronic Illness     Findings of the 6 clinical characteristics of malnutrition:  Energy Intake:  Mild decrease in energy intake (Comment)  Weight Loss:  No significant weight loss     Body Fat Loss:  Mild body fat loss Orbital   Muscle Mass Loss:  No significant muscle mass loss    Fluid Accumulation:  Mild Extremities   Strength:  Not Performed    Nutrition Assessment:    Follow-up. Pt had diet advanced yesterday to Easy to chew and thin liquids. Intakes have slightly improved over the last 3 days, but remains <50%. No intakes of ONS. Will trial different ONS. Still would consider alternative nutrition given ongoing poor PO intakes. Noted pt with mabel BM since 8/5, recommend adding bowel regimen. Nutrition Related Findings:    BM 8/5; Glu 134 Wound Type: Deep Tissue Injury (sacrum)       Current Nutrition Intake & Therapies:    Average Meal Intake: 1-25%, 26-50%, 51-75%  Average Supplements Intake: 0%  ADULT ORAL NUTRITION SUPPLEMENT; Lunch, Dinner; Frozen Oral Supplement  ADULT DIET; Easy to Chew    Anthropometric Measures:  Height: 5' 4\" (162.6 cm)  Ideal Body Weight (IBW): 120 lbs (55 kg)    Admission Body Weight: 135 lb 9.3 oz (61.5 kg)  Current Body Weight: 129 lb (58.5 kg), 107.5 % IBW.  Weight Source: Bed Scale  Current BMI (kg/m2): 22.1  Usual Body Weight: 139 lb (63 kg)  % Weight Change (Calculated): -4.2                    BMI Categories: Normal Weight (BMI 22.0 to 24.9) age over 72    Estimated Daily Nutrient Needs:        Energy (kcal/day): 9264-1113 (25-30 x CBW 60)     Protein (g/day): 60- 72 (1-1.2 x CBW 60)     Fluid (ml/day):

## 2022-08-13 PROBLEM — R09.02 HYPOXIA: Status: ACTIVE | Noted: 2022-08-13

## 2022-08-13 LAB
ANION GAP SERPL CALCULATED.3IONS-SCNC: 6 MMOL/L (ref 3–16)
ANISOCYTOSIS: ABNORMAL
BASOPHILS ABSOLUTE: 0 K/UL (ref 0–0.2)
BASOPHILS RELATIVE PERCENT: 0 %
BUN BLDV-MCNC: 12 MG/DL (ref 7–20)
CALCIUM SERPL-MCNC: 8.7 MG/DL (ref 8.3–10.6)
CHLORIDE BLD-SCNC: 96 MMOL/L (ref 99–110)
CO2: 33 MMOL/L (ref 21–32)
CREAT SERPL-MCNC: <0.5 MG/DL (ref 0.6–1.2)
EOSINOPHILS ABSOLUTE: 0 K/UL (ref 0–0.6)
EOSINOPHILS RELATIVE PERCENT: 0 %
GFR AFRICAN AMERICAN: >60
GFR NON-AFRICAN AMERICAN: >60
GLUCOSE BLD-MCNC: 94 MG/DL (ref 70–99)
HCT VFR BLD CALC: 25.7 % (ref 36–48)
HEMOGLOBIN: 8.6 G/DL (ref 12–16)
LYMPHOCYTES ABSOLUTE: 2.6 K/UL (ref 1–5.1)
LYMPHOCYTES RELATIVE PERCENT: 24 %
MAGNESIUM: 1.8 MG/DL (ref 1.8–2.4)
MCH RBC QN AUTO: 29.3 PG (ref 26–34)
MCHC RBC AUTO-ENTMCNC: 33.5 G/DL (ref 31–36)
MCV RBC AUTO: 87.7 FL (ref 80–100)
MONOCYTES ABSOLUTE: 0.4 K/UL (ref 0–1.3)
MONOCYTES RELATIVE PERCENT: 4 %
NEUTROPHILS ABSOLUTE: 7.8 K/UL (ref 1.7–7.7)
NEUTROPHILS RELATIVE PERCENT: 72 %
PDW BLD-RTO: 15.3 % (ref 12.4–15.4)
PHOSPHORUS: 2.6 MG/DL (ref 2.5–4.9)
PLATELET # BLD: 662 K/UL (ref 135–450)
PLATELET SLIDE REVIEW: ABNORMAL
PMV BLD AUTO: 6.8 FL (ref 5–10.5)
POTASSIUM REFLEX MAGNESIUM: 4 MMOL/L (ref 3.5–5.1)
RBC # BLD: 2.93 M/UL (ref 4–5.2)
SLIDE REVIEW: ABNORMAL
SMUDGE CELLS: PRESENT
SODIUM BLD-SCNC: 135 MMOL/L (ref 136–145)
WBC # BLD: 10.8 K/UL (ref 4–11)

## 2022-08-13 PROCEDURE — 83735 ASSAY OF MAGNESIUM: CPT

## 2022-08-13 PROCEDURE — 2060000000 HC ICU INTERMEDIATE R&B

## 2022-08-13 PROCEDURE — 99233 SBSQ HOSP IP/OBS HIGH 50: CPT | Performed by: INTERNAL MEDICINE

## 2022-08-13 PROCEDURE — 6360000002 HC RX W HCPCS: Performed by: INTERNAL MEDICINE

## 2022-08-13 PROCEDURE — 36415 COLL VENOUS BLD VENIPUNCTURE: CPT

## 2022-08-13 PROCEDURE — 2580000003 HC RX 258: Performed by: INTERNAL MEDICINE

## 2022-08-13 PROCEDURE — 6370000000 HC RX 637 (ALT 250 FOR IP): Performed by: NURSE PRACTITIONER

## 2022-08-13 PROCEDURE — 6360000002 HC RX W HCPCS: Performed by: NURSE PRACTITIONER

## 2022-08-13 PROCEDURE — 84100 ASSAY OF PHOSPHORUS: CPT

## 2022-08-13 PROCEDURE — 85025 COMPLETE CBC W/AUTO DIFF WBC: CPT

## 2022-08-13 PROCEDURE — 6370000000 HC RX 637 (ALT 250 FOR IP): Performed by: STUDENT IN AN ORGANIZED HEALTH CARE EDUCATION/TRAINING PROGRAM

## 2022-08-13 PROCEDURE — 80048 BASIC METABOLIC PNL TOTAL CA: CPT

## 2022-08-13 PROCEDURE — 6370000000 HC RX 637 (ALT 250 FOR IP): Performed by: INTERNAL MEDICINE

## 2022-08-13 PROCEDURE — 94669 MECHANICAL CHEST WALL OSCILL: CPT

## 2022-08-13 PROCEDURE — 2700000000 HC OXYGEN THERAPY PER DAY

## 2022-08-13 PROCEDURE — 94640 AIRWAY INHALATION TREATMENT: CPT

## 2022-08-13 PROCEDURE — 94761 N-INVAS EAR/PLS OXIMETRY MLT: CPT

## 2022-08-13 PROCEDURE — 2580000003 HC RX 258: Performed by: STUDENT IN AN ORGANIZED HEALTH CARE EDUCATION/TRAINING PROGRAM

## 2022-08-13 RX ORDER — MAGNESIUM SULFATE IN WATER 40 MG/ML
2000 INJECTION, SOLUTION INTRAVENOUS ONCE
Status: COMPLETED | OUTPATIENT
Start: 2022-08-13 | End: 2022-08-13

## 2022-08-13 RX ORDER — SODIUM CHLORIDE FOR INHALATION 3 %
15 VIAL, NEBULIZER (ML) INHALATION
Status: DISCONTINUED | OUTPATIENT
Start: 2022-08-14 | End: 2022-08-16 | Stop reason: HOSPADM

## 2022-08-13 RX ORDER — TRAMADOL HYDROCHLORIDE 50 MG/1
25 TABLET ORAL EVERY 6 HOURS PRN
Status: DISCONTINUED | OUTPATIENT
Start: 2022-08-13 | End: 2022-08-16 | Stop reason: HOSPADM

## 2022-08-13 RX ORDER — LACTOBACILLUS RHAMNOSUS GG 10B CELL
1 CAPSULE ORAL
Status: DISCONTINUED | OUTPATIENT
Start: 2022-08-14 | End: 2022-08-16 | Stop reason: HOSPADM

## 2022-08-13 RX ADMIN — TOBRAMYCIN 300 MG: 300 SOLUTION RESPIRATORY (INHALATION) at 20:01

## 2022-08-13 RX ADMIN — METOPROLOL SUCCINATE 12.5 MG: 25 TABLET, EXTENDED RELEASE ORAL at 08:58

## 2022-08-13 RX ADMIN — TIZANIDINE 4 MG: 4 TABLET ORAL at 21:33

## 2022-08-13 RX ADMIN — GABAPENTIN 600 MG: 300 CAPSULE ORAL at 08:35

## 2022-08-13 RX ADMIN — NYSTATIN 500000 UNITS: 100000 SUSPENSION ORAL at 21:34

## 2022-08-13 RX ADMIN — TOBRAMYCIN 300 MG: 300 SOLUTION RESPIRATORY (INHALATION) at 08:08

## 2022-08-13 RX ADMIN — ATORVASTATIN CALCIUM 40 MG: 40 TABLET, FILM COATED ORAL at 21:34

## 2022-08-13 RX ADMIN — DULOXETINE HYDROCHLORIDE 60 MG: 60 CAPSULE, DELAYED RELEASE ORAL at 08:35

## 2022-08-13 RX ADMIN — CLOPIDOGREL BISULFATE 75 MG: 75 TABLET ORAL at 08:35

## 2022-08-13 RX ADMIN — NYSTATIN 500000 UNITS: 100000 SUSPENSION ORAL at 08:35

## 2022-08-13 RX ADMIN — PANTOPRAZOLE SODIUM 40 MG: 40 TABLET, DELAYED RELEASE ORAL at 05:29

## 2022-08-13 RX ADMIN — IPRATROPIUM BROMIDE AND ALBUTEROL SULFATE 1 AMPULE: 2.5; .5 SOLUTION RESPIRATORY (INHALATION) at 08:01

## 2022-08-13 RX ADMIN — ASPIRIN 81 MG: 81 TABLET, CHEWABLE ORAL at 08:35

## 2022-08-13 RX ADMIN — SODIUM CHLORIDE SOLN NEBU 3% 15 ML: 3 NEBU SOLN at 08:00

## 2022-08-13 RX ADMIN — MAGNESIUM SULFATE HEPTAHYDRATE 2000 MG: 40 INJECTION, SOLUTION INTRAVENOUS at 09:34

## 2022-08-13 RX ADMIN — METHYLPREDNISOLONE SODIUM SUCCINATE 40 MG: 40 INJECTION, POWDER, FOR SOLUTION INTRAMUSCULAR; INTRAVENOUS at 08:58

## 2022-08-13 RX ADMIN — TRAMADOL HYDROCHLORIDE 25 MG: 50 TABLET, COATED ORAL at 12:30

## 2022-08-13 RX ADMIN — NYSTATIN 500000 UNITS: 100000 SUSPENSION ORAL at 16:44

## 2022-08-13 RX ADMIN — IPRATROPIUM BROMIDE AND ALBUTEROL SULFATE 1 AMPULE: 2.5; .5 SOLUTION RESPIRATORY (INHALATION) at 20:01

## 2022-08-13 RX ADMIN — MOMETASONE FUROATE AND FORMOTEROL FUMARATE DIHYDRATE 2 PUFF: 200; 5 AEROSOL RESPIRATORY (INHALATION) at 20:01

## 2022-08-13 RX ADMIN — SODIUM CHLORIDE SOLN NEBU 3% 15 ML: 3 NEBU SOLN at 03:20

## 2022-08-13 RX ADMIN — SODIUM CHLORIDE: 9 INJECTION, SOLUTION INTRAVENOUS at 21:42

## 2022-08-13 RX ADMIN — IPRATROPIUM BROMIDE AND ALBUTEROL SULFATE 1 AMPULE: 2.5; .5 SOLUTION RESPIRATORY (INHALATION) at 11:39

## 2022-08-13 RX ADMIN — NYSTATIN 500000 UNITS: 100000 SUSPENSION ORAL at 12:55

## 2022-08-13 RX ADMIN — DULOXETINE HYDROCHLORIDE 60 MG: 60 CAPSULE, DELAYED RELEASE ORAL at 21:34

## 2022-08-13 RX ADMIN — GABAPENTIN 600 MG: 300 CAPSULE ORAL at 21:33

## 2022-08-13 RX ADMIN — SODIUM CHLORIDE, PRESERVATIVE FREE 10 ML: 5 INJECTION INTRAVENOUS at 08:58

## 2022-08-13 RX ADMIN — MEROPENEM 1000 MG: 1 INJECTION, POWDER, FOR SOLUTION INTRAVENOUS at 14:01

## 2022-08-13 RX ADMIN — MEROPENEM 1000 MG: 1 INJECTION, POWDER, FOR SOLUTION INTRAVENOUS at 05:03

## 2022-08-13 RX ADMIN — SODIUM CHLORIDE SOLN NEBU 3% 15 ML: 3 NEBU SOLN at 11:39

## 2022-08-13 RX ADMIN — SODIUM CHLORIDE, PRESERVATIVE FREE 10 ML: 5 INJECTION INTRAVENOUS at 21:44

## 2022-08-13 RX ADMIN — MOMETASONE FUROATE AND FORMOTEROL FUMARATE DIHYDRATE 2 PUFF: 200; 5 AEROSOL RESPIRATORY (INHALATION) at 08:00

## 2022-08-13 RX ADMIN — SODIUM CHLORIDE SOLN NEBU 3% 15 ML: 3 NEBU SOLN at 20:01

## 2022-08-13 RX ADMIN — MEROPENEM 1000 MG: 1 INJECTION, POWDER, FOR SOLUTION INTRAVENOUS at 21:43

## 2022-08-13 ASSESSMENT — PAIN DESCRIPTION - PAIN TYPE: TYPE: CHRONIC PAIN

## 2022-08-13 ASSESSMENT — PAIN DESCRIPTION - ONSET: ONSET: ON-GOING

## 2022-08-13 ASSESSMENT — PAIN DESCRIPTION - FREQUENCY: FREQUENCY: CONTINUOUS

## 2022-08-13 ASSESSMENT — PAIN SCALES - GENERAL
PAINLEVEL_OUTOF10: 0
PAINLEVEL_OUTOF10: 3
PAINLEVEL_OUTOF10: 0
PAINLEVEL_OUTOF10: 9

## 2022-08-13 ASSESSMENT — PAIN DESCRIPTION - ORIENTATION: ORIENTATION: RIGHT

## 2022-08-13 ASSESSMENT — PAIN DESCRIPTION - DESCRIPTORS: DESCRIPTORS: ACHING

## 2022-08-13 ASSESSMENT — PAIN - FUNCTIONAL ASSESSMENT: PAIN_FUNCTIONAL_ASSESSMENT: ACTIVITIES ARE NOT PREVENTED

## 2022-08-13 ASSESSMENT — PAIN DESCRIPTION - LOCATION: LOCATION: CHEST

## 2022-08-13 NOTE — PLAN OF CARE
injury  8/13/2022 1057 by Cristobal Earl RN  Outcome: Progressing     Problem: ABCDS Injury Assessment  Goal: Absence of physical injury  8/13/2022 1057 by Cristobal Earl RN  Outcome: Progressing     Problem: Chronic Conditions and Co-morbidities  Goal: Patient's chronic conditions and co-morbidity symptoms are monitored and maintained or improved  8/13/2022 1057 by Cristobal Earl RN  Outcome: Progressing     Problem: Nutrition Deficit:  Goal: Optimize nutritional status  8/13/2022 1057 by Cristobal Earl RN  Outcome: Progressing  Note: Patient in NPO status at this time. Problem: Safety - Medical Restraint  Goal: Remains free of injury from restraints (Restraint for Interference with Medical Device)  Description: INTERVENTIONS:  1. Determine that other, less restrictive measures have been tried or would not be effective before applying the restraint  2. Evaluate the patient's condition at the time of restraint application  3. Inform patient/family regarding the reason for restraint  4.  Q2H: Monitor safety, psychosocial status, comfort, nutrition and hydration  8/13/2022 1057 by Cristobal Earl RN  Outcome: Progressing

## 2022-08-13 NOTE — PROGRESS NOTES
Hospitalist Progress Note      PCP: Benjamin Mora, APRN - CNP    Date of Admission: 7/18/2022    Chief Complaint: respiratory distress    Hospital Course:    Pt is an 78y.o. year-old female with a history of hyperlipidemia, fibromyalgia, chronic pain syndrome, severe COPD with chronic hypoxic respiratory failure requiring oxygen at 4 L/min via nasal cannula continuously. She presents to the emergency room for evaluation following a 2 to 3-day history of worsening shortness of breath. She was recently treated for Pseudomonas pneumonia and discharged to rehab. EMS reports that she was on a very long oxygen tube when they arrived. She was placed on 6 L nasal cannula and her oxygen saturation improved. In the emergency room she was found to have an abnormal EKG with inverted T waves in V2 through V5. Cardiology was consulted and asked that she be put on a heparin drip. Patient denies any current or recent chest pain. She is being admitted for further evaluation and treatment. Associated signs and symptoms do not include chest pain, lightheaded, dizziness, diaphoresis, edema, orthopnea, paroxysmal nocturnal dyspnea, fever or chills. No recent medication changes. 8/10  Patient continues to require oxygen per nonrebreather, 12 L. Intermittent need of vasopressors. 8/11  Patient appears to be doing well, tolerating nasal cannula, 10 L. LTAC placement is pending, due to significant O2 requirements unable to go to regular nursing facility. 8/12  Appears to be dong better, saturating well on 6 L NC. Diet upgraded to a regular diet with supplements. She is still requiring levophed; therefore, unable to go to floor     8/13  Patient remains on 5 L O2 per nasal cannula. Reports pleuritic chest pain on right hemithorax.   Tramadol as needed added     Subjective:  as above    Medications:  Reviewed    Infusion Medications    sodium chloride Stopped (08/11/22 2085)    norepinephrine Stopped (08/12/22 0507)    dextrose       Scheduled Medications    magnesium sulfate  2,000 mg IntraVENous Once    metoprolol succinate  12.5 mg Oral Daily    tobramycin (PF)  300 mg Nebulization BID    clopidogrel  75 mg Oral Daily    methylPREDNISolone  40 mg IntraVENous Daily    pantoprazole  40 mg Oral QAM AC    ipratropium-albuterol  1 ampule Inhalation 4x daily    aspirin  325 mg Oral Once    aspirin  81 mg Oral Daily    sodium chloride (Inhalant)  15 mL Nebulization Q4H While awake    [Held by provider] enoxaparin  40 mg SubCUTAneous Daily    meropenem  1,000 mg IntraVENous Q8H    nystatin  5 mL Oral 4x Daily    sodium chloride flush  5-40 mL IntraVENous 2 times per day    atorvastatin  40 mg Oral Nightly    tiZANidine  4 mg Oral Nightly    gabapentin  600 mg Oral BID    DULoxetine  60 mg Oral BID    mometasone-formoterol  2 puff Inhalation BID     PRN Meds: traMADol, guaiFENesin-dextromethorphan, sodium chloride, calcium carbonate, potassium chloride, sodium chloride, sodium chloride flush, acetaminophen, perflutren lipid microspheres, traZODone, ipratropium-albuterol, cetirizine, albuterol sulfate HFA, glucose, dextrose bolus **OR** dextrose bolus, glucagon (rDNA), dextrose, ondansetron, polyethylene glycol      Intake/Output Summary (Last 24 hours) at 8/13/2022 1026  Last data filed at 8/13/2022 0800  Gross per 24 hour   Intake 763.59 ml   Output 825 ml   Net -61.41 ml         Exam:    BP 99/60   Pulse (!) 102   Temp 97.7 °F (36.5 °C) (Oral)   Resp 18   Ht 5' 4\" (1.626 m)   Wt 129 lb 6.6 oz (58.7 kg)   SpO2 93%   BMI 22.21 kg/m²     General appearance: Anxious+, appears stated age and cooperative. HEENT: Pupils equal, round, and reactive to light. Conjunctivae/corneas clear. Neck: Supple, with full range of motion. No jugular venous distention. Trachea midline. Respiratory:  Comfortably on Bipap. Crackles bibasally.   Cardiovascular: Regular rate and rhythm with normal S1/S2 without murmurs, rubs or gallops. Abdomen: Soft, non-tender, non-distended with normal bowel sounds. Musculoskeletal: No clubbing, cyanosis or edema bilaterally. Full range of motion without deformity. Skin: Skin color, texture, turgor normal.  No rashes or lesions. Neurologic:  Neurovascularly intact without any focal sensory/motor deficits. Cranial nerves: II-XII intact, grossly non-focal.  Psychiatric: Alert and oriented, thought content appropriate, normal insight  Capillary Refill: Brisk,< 3 seconds   Peripheral Pulses: +2 palpable, equal bilaterally       Labs:   Recent Labs     08/11/22 0756 08/12/22 0316 08/13/22  0418   WBC 9.8 13.0* 10.8   HGB 8.9* 9.0* 8.6*   HCT 26.0* 27.5* 25.7*   * 752* 662*       Recent Labs     08/11/22 0756 08/12/22 0316 08/13/22  0417   * 136 135*   K 4.8 4.1 4.0   CL 93* 94* 96*   CO2 33* 33* 33*   BUN 13 15 12   CREATININE <0.5* <0.5* <0.5*   CALCIUM 9.3 9.0 8.7   PHOS 4.0 2.9 2.6       No results for input(s): AST, ALT, BILIDIR, BILITOT, ALKPHOS in the last 72 hours. No results for input(s): INR in the last 72 hours. No results for input(s): Ardelle Chalk in the last 72 hours. Urinalysis:      Lab Results   Component Value Date/Time    NITRU Negative 07/22/2022 06:31 AM    WBCUA 2 07/22/2022 06:31 AM    BACTERIA None Seen 07/22/2022 06:31 AM    RBCUA 5 07/22/2022 06:31 AM    BLOODU Negative 07/22/2022 06:31 AM    SPECGRAV 1.049 07/22/2022 06:31 AM    GLUCOSEU Negative 07/22/2022 06:31 AM       Radiology:  CT CHEST WO CONTRAST   Final Result   1. Multifocal pneumonia, primary in the right lung, with areas of improvement   since 07/23/2022. Right upper lobe pulmonary abscess is persistent, mildly   decreased with small amount of fluid in the interval.   2. Few foci of airspace disease in the left lower lobe are mildly increased. 3. Persistent small right pleural effusion. 4. Stable mediastinal adenopathy, likely reactive in the setting.          Fluoroscopy modified barium swallow with video   Final Result   No evidence of aspiration. Please see separate speech pathology report for full discussion of findings   and recommendations. RECOMMENDATIONS:   Unavailable         XR CHEST PORTABLE   Final Result   Left-sided PICC line tip is in the superior vena cava, the tip is turned   upward superiorly, a change from priors. Moderate right pleural effusion and right lung consolidations similar to   prior study. XR CHEST PORTABLE   Final Result   Increasing opacity throughout portions of the right lung, which can reflect   combination of airspace disease, atelectasis, and pleural effusion. XR CHEST PORTABLE   Final Result   Endotracheal tube with the tip approximately 1 cm above the song. Consider   retraction by approximately 3.5 cm. Enteric tube with the tip and the side   hole below the diaphragm overlying the left upper abdomen, likely within the   fundus of the stomach. Redemonstration of multifocal pneumonia affecting the right lung. Small   right pleural effusion. XR CHEST PORTABLE   Final Result   Unchanged multifocal right-sided pneumonia. XR CHEST PORTABLE   Final Result   Stable multifocal airspace disease in the right lung with probable upper lobe   abscess. XR CHEST PORTABLE   Final Result   Stable multifocal airspace disease in the right lung, including probable   pulmonary abscess in the right upper lobe. CT CHEST WO CONTRAST   Final Result   1. Mixed consolidative and ground-glass opacities as well as interstitial   thickening throughout the majority of the right lung compatible with   pneumonia. 2. There is a cavity in the right upper lobe demonstrating an air-fluid level   measuring up to 9.7 cm concerning for abscess formation. 3. Trace right pleural effusion. 4. Prominent and enlarged mediastinal lymph nodes, likely reactive. 5. Stable 6 mm left lower lobe pulmonary nodule. RECOMMENDATIONS:   Fleischner Society guidelines for follow-up and management of incidentally   detected pulmonary nodules:      Single Solid Nodule:      Nodule size less than 6 mm   In a low-risk patient, no routine follow-up. In a high-risk patient, optional CT at 12 months. Nodule size equals 6-8 mm   In a low-risk patient, CT at 6-12 months, then consider CT at 18-24 months. In a high-risk patient, CT at 6-12 months, then CT at 18-24 months. Nodule size greater than 8 mm         In a low-risk patient, consider CT at 3 months, PET/CT, or tissue sampling. In a high-risk patient, consider CT at 3 months, PET/CT, or tissue sampling. Multiple Solid Nodules:      Nodule size less than 6 mm   In a low-risk patient, no routine follow-up. In a high-risk patient, optional CT at 12 months. Nodule size equals 6-8 mm   In a low-risk patient, CT at 3-6 months, then consider CT at 18-24 months. In a high-risk patient, CT at 3-6 months, then CT at 18-24 months. Nodule size greater than 8 mm   In a low-risk patient, CT at 3-6 months, then consider CT at 18-24 months. In a high-risk patient, CT at 3-6 months, then CT at 18-24 months. - Low risk patients include individuals with minimal or absent history of   smoking and other known risk factors. - High risk patients include individuals with a history or smoking or known   risk factors. Radiology 2017 http://pubs. rsna.org/doi/full/10.1148/radiol. 9768963069         XR CHEST PORTABLE   Final Result   1. Increased pneumonia throughout the right lung remaining most prominent in   the right upper lobe. 2. Emphysema better demonstrated on CT. 3. Possible trace right pleural effusion.          XR CHEST PORTABLE   Final Result   Improved aeration of the right chest with persistent consolidation in the mid   to upper right chest.         CT CHEST WO CONTRAST    (Results Pending)           Assessment/Plan:    [facility] repeated on 7/30 without pseudomonnas  - Repeat CT chest on 8/11    Multifocal pneumonia, primary in the right lung, with areas of improvement   since 07/23/2022. Right upper lobe pulmonary abscess is persistent, mildly   decreased with small amount of fluid in the interval.         3. Epistaxis- resolved. Right nare Rhinorocket Dcd.  -DC lovenox.  -Keep ASA and prasugrel going  -HnH qd  -Transfuse PRBCs if hb is below 7.0  -Transfer out of ICU in next 24hrs        4. Critical coronary artery disease single-vessel  Status post PCI to left circumflex by Dr Michelle Holguin on this admission  with stent  Now on antiplatelet therapy, aspirin and Plavix     5. Anemia, AOCD. Now has hemoptysis. FU iron studies TIBC ferritin % and reticulocyte count  No indication to transfusion yet       6. COPD, severe ([address] - without acute exacerbation. Will provide Nebulizer treatments as needed and continue home medications. Patient will be monitored closely, and deep breathing and coughing will be encouraged while awake. 7. Hyperlipidemia - No current evidence of Rhabdomyolysis or other adverse effects. Continue statin therapy while in the hospital     8. Chronic pain syndrome -continue her home pain med regimen     9. Fibromyalgia - provide supportive care    DVT Prophylaxis: heparin  Diet: ADULT ORAL NUTRITION SUPPLEMENT; Lunch, Breakfast, Dinner; Standard High Calorie/High Protein Oral Supplement  ADULT DIET; Regular  Code Status: Limited    PT/OT Eval Status: evaluating    Dispo; Transfer out of ICU.  Can be Dcd in am.    sIrael Saez MD

## 2022-08-13 NOTE — PLAN OF CARE
Problem: Discharge Planning  Goal: Discharge to home or other facility with appropriate resources  Outcome: Progressing  Flowsheets (Taken 8/12/2022 2000)  Discharge to home or other facility with appropriate resources:   Identify barriers to discharge with patient and caregiver   Arrange for needed discharge resources and transportation as appropriate   Identify discharge learning needs (meds, wound care, etc)     Problem: Pain  Goal: Verbalizes/displays adequate comfort level or baseline comfort level  Outcome: Progressing  Flowsheets  Taken 8/12/2022 2000 by Hailey Mccracken RN  Verbalizes/displays adequate comfort level or baseline comfort level: Encourage patient to monitor pain and request assistance  Taken 8/12/2022 0900 by Robert Chinchilla RN  Verbalizes/displays adequate comfort level or baseline comfort level: Encourage patient to monitor pain and request assistance     Problem: Confusion  Goal: Confusion, delirium, dementia, or psychosis is improved or at baseline  Description: INTERVENTIONS:  1. Assess for possible contributors to thought disturbance, including medications, impaired vision or hearing, underlying metabolic abnormalities, dehydration, psychiatric diagnoses, and notify attending LIP  2. [place] high risk fall precautions, as indicated  3. Provide frequent short contacts to provide reality reorientation, refocusing and direction  4. Decrease environmental stimuli, including noise as appropriate  5. Monitor and intervene to maintain adequate nutrition, hydration, elimination, sleep and activity  6. If unable to ensure safety without constant attention obtain sitter and review sitter guidelines with assigned personnel  7. Initiate Psychosocial CNS and Spiritual Care consult, as indicated  Outcome: Progressing     Problem: Skin/Tissue Integrity  Goal: Absence of new skin breakdown  Description: 1. Monitor for areas of redness and/or skin breakdown  2. Assess vascular access sites hourly  3. Every 4-6 hours minimum:  Change oxygen saturation probe site  4. Every 4-6 hours:  If on nasal continuous positive airway pressure, respiratory therapy assess nares and determine need for appliance change or resting period. Outcome: Progressing     Problem: Safety - Adult  Goal: Free from fall injury  Outcome: Progressing  Flowsheets (Taken 8/12/2022 2247)  Free From Fall Injury: Instruct family/caregiver on patient safety     Problem: ABCDS Injury Assessment  Goal: Absence of physical injury  Outcome: Progressing  Flowsheets (Taken 8/12/2022 2247)  Absence of Physical Injury: Implement safety measures based on patient assessment     Problem: Chronic Conditions and Co-morbidities  Goal: Patient's chronic conditions and co-morbidity symptoms are monitored and maintained or improved  Outcome: Progressing  Flowsheets (Taken 8/12/2022 2000)  Care Plan - Patient's Chronic Conditions and Co-Morbidity Symptoms are Monitored and Maintained or Improved:   Monitor and assess patient's chronic conditions and comorbid symptoms for stability, deterioration, or improvement   Collaborate with multidisciplinary team to address chronic and comorbid conditions and prevent exacerbation or deterioration   Update acute care plan with appropriate goals if chronic or comorbid symptoms are exacerbated and prevent overall improvement and discharge     Problem: Nutrition Deficit:  Goal: Optimize nutritional status  Outcome: Progressing     Problem: Safety - Medical Restraint  Goal: Remains free of injury from restraints (Restraint for Interference with Medical Device)  Description: INTERVENTIONS:  1. Determine that other, less restrictive measures have been tried or would not be effective before applying the restraint  2. Evaluate the patient's condition at the time of restraint application  3. Inform patient/family regarding the reason for restraint  4.  Q2H: Monitor safety, psychosocial status, comfort, nutrition and hydration  Outcome: Progressing

## 2022-08-13 NOTE — PROGRESS NOTES
Pulmonary Critical Care Progress Note     Patient's name:  Kobe Peterson  Medical Record Number: [id number]  Patient's account/billing number: [de-identified]  Patient's YOB: 1945  Age: 78 y.o. Date of Admission: 7/18/2022  5:03 PM  Date of Consult: 8/13/2022      Primary Care Physician: Chaparrita Pina, HECTOR - CNP      Code Status: Limited    Chief complaint: acute respiratory failure with hypoxia    Assessment and Plan     Acute respiratory failure with hypoxia  Necrotizing pseudomonas PNA  Massive Epistaxis s/p Rhinorocket removed  Moderately Severe copd     Plan:  Nebulized duoneb QID and hypertonic saline  Dulera  Acapella  O2 to keep sat > 90%  On ASA and plavix  Steroid x 5 days    Antibiotics per ID, meropenem and Arnie  Stable to transfer out of the ICU. Overnight:  Sob  Cough  Diff clearing secretions  On 5 L    REVIEW OF SYSTEMS:  Review of Systems -   General ROS: negative  Psychological ROS: negative  Ophthalmic ROS: negative  ENT ROS: mild right epistaxis   Allergy and Immunology ROS: negative  Hematological and Lymphatic ROS: negative  Endocrine ROS: negative  Breast ROS: negative  Respiratory ROS: cough and sob  Cardiovascular ROS: no chest pain or dyspnea on exertion  Gastrointestinal ROS:negative  Genito-Urinary ROS: negative  Musculoskeletal ROS: negative  Neurological ROS: negative  Dermatological ROS: negative        Physical Exam:    Vitals: /61   Pulse (!) 102   Temp 97.7 °F (36.5 °C) (Oral)   Resp 20   Ht 5' 4\" (1.626 m)   Wt 129 lb 6.6 oz (58.7 kg)   SpO2 96%   BMI 22.21 kg/m²     Last Body weight:   Wt Readings from Last 3 Encounters:   08/13/22 129 lb 6.6 oz (58.7 kg)   07/13/22 133 lb 13.1 oz (60.7 kg)   06/20/22 138 lb (62.6 kg)       Body Mass Index : Body mass index is 22.21 kg/m².       Intake and Output summary:   Intake/Output Summary (Last 24 hours) at 8/13/2022 1147  Last data filed at 8/13/2022 1105  Gross per 24 hour   Intake 763.59 ml   Output 935 ml   Net -171.41 ml         Physical Examination:     Gen:  acutely ill appearing, weak   Eyes: PERRL. Anicteric sclera. No conjunctival injection. ENT: No discharge. Posterior oropharynx clear. External appearance of ears and nose normal.  Neck: Trachea midline. No mass   Resp:  Moderate rhonchi and diminished on the right, no wheezing, mild increase wob  CV: Regular rate. Regular rhythm. No murmur or rub. No edema. GI: Soft, Non-tender. Non-distended. +BS  Skin: Warm, dry, w/o erythema. Lymph: No cervical or supraclavicular LAD. M/S: No cyanosis. No clubbing. Neuro:  CN 2-12 tested, no focal neurologic deficit. Moves all extremities  Psych: Awake and alert, Oriented x 3. Judgement and insight appropriate. Mood stable. Laboratory findings:-    CBC:   Recent Labs     08/13/22  0418   WBC 10.8   HGB 8.6*   *       BMP:    Recent Labs     08/11/22  0756 08/12/22  0316 08/13/22  0417   * 136 135*   K 4.8 4.1 4.0   CL 93* 94* 96*   CO2 33* 33* 33*   BUN 13 15 12   CREATININE <0.5* <0.5* <0.5*   GLUCOSE 132* 134* 94       S. Calcium:  Recent Labs     08/13/22  0417   CALCIUM 8.7           Radiology Review:  Pertinent images / reports were reviewed as a part of this visit.   CXR reviewed extensive right airspace disease                     Mary Cordoba MD, MDUARTE.            8/13/2022, 11:47 AM

## 2022-08-13 NOTE — PROGRESS NOTES
Infectious Diseases   Progress Note      Admission Date: 7/18/2022  Hospital Day: Hospital Day: 27   Attending: Kavin Andersen, *  Date of service: 8/13/2022     Chief complaint/ Reason for consult:     Severe necrotizing Pseudomonas pneumonia with evolving right lung abscess  Acute respiratory failure with hypoxia  Endotracheally intubated for airway protection 100 322, was subsequently extubated  Coronary artery disease s/p left heart cath  Long-term smoker    Microbiology:        I have reviewed allavailable micro lab data and cultures    Sputum culture - collectedon 7/19/2022: Pseudomonas aeruginosa  Susceptibility      Pseudomonas aeruginosa (1)    Antibiotic Interpretation Microscan  Method Status    cefepime Sensitive 8 mcg/mL BACTERIAL SUSCEPTIBILITY PANEL BY TIERRA     ciprofloxacin Resistant >2 mcg/mL BACTERIAL SUSCEPTIBILITY PANEL BY TIERRA     gentamicin Sensitive <=4 mcg/mL BACTERIAL SUSCEPTIBILITY PANEL BY TIERRA     meropenem Sensitive <=1 mcg/mL BACTERIAL SUSCEPTIBILITY PANEL BY TIERRA     piperacillin-tazobactam Sensitive <=16 mcg/mL BACTERIAL SUSCEPTIBILITY PANEL BY TIERRA     tobramycin Sensitive <=4 mcg/mL BACTERIAL SUSCEPTIBILITY PANEL BY TIERRA     levofloxacin Resistant >32 ug/ml BACTERIAL SUSCEPTIBILITY PANEL BY E-TEST          Sputum culture  - collected on 7/22/2022: Pseudomonas aeruginosa    Susceptibility      Pseudomonas aeruginosa (1)    Antibiotic Interpretation Microscan  Method Status    cefepime Sensitive 8 mcg/mL BACTERIAL SUSCEPTIBILITY PANEL BY TIERRA     ciprofloxacin Resistant >2 mcg/mL BACTERIAL SUSCEPTIBILITY PANEL BY TIERRA     gentamicin Sensitive <=4 mcg/mL BACTERIAL SUSCEPTIBILITY PANEL BY TIERRA     meropenem Sensitive <=1 mcg/mL BACTERIAL SUSCEPTIBILITY PANEL BY TIERRA     piperacillin-tazobactam Sensitive <=16 mcg/mL BACTERIAL SUSCEPTIBILITY PANEL BY TIERRA     tobramycin Sensitive <=4 mcg/mL BACTERIAL SUSCEPTIBILITY PANEL BY TIERRA         Antibiotics and immunizations:       Current antibiotics: All antibiotics and their doses were reviewed by me    Recent Abx Admin                     meropenem (MERREM) 1,000 mg in sodium chloride 0.9 % 100 mL IVPB (mini-bag) (mg) 1,000 mg New Bag 08/13/22 1401     1,000 mg New Bag  0503     1,000 mg New Bag 08/12/22 2203    tobramycin (PF) (LUCILLE) nebulizer solution 300 mg (mg) 300 mg Given 08/13/22 0808     300 mg Given 08/12/22 1955                      Immunization History: All immunization history was reviewed by me today. Immunization History   Administered Date(s) Administered    COVID-19, MODERNA SUDHA border, Primary or Immunocompromised, (age 12y+), IM, 100 mcg/0.5mL 03/08/2021, 04/05/2021    COVID-19, MODERNA Booster BLUE border, (age 18y+), IM, 50mcg/0.25mL 12/01/2021    Influenza Vaccine, unspecified formulation 01/10/2017    Influenza, High Dose (Fluzone 65 yrs and older) 11/04/2013, 01/21/2016, 09/01/2017, 10/30/2018, 09/18/2020    Influenza, Carlton Pilar, adjuvanted, 65 yrs +, IM, PF (Fluad) 09/27/2021    Influenza, Triv, inactivated, subunit, adjuvanted, IM (Fluad 65 yrs and older) 09/13/2019    Pneumococcal Conjugate 13-valent ([id number]) 01/19/2015    Pneumococcal Conjugate Vaccine 01/10/2017    Pneumococcal Polysaccharide ([id number]) 10/12/2012    Tdap (Boostrix, Adacel) 07/17/2007    Zoster Recombinant (Shingrix) 11/21/2019       Known drug allergies: All allergies were reviewed and updated    No Known Allergies    Social history:     Social History:  All social andepidemiologic history was reviewed and updated by me today as needed. Tobacco use:   reports that she has been smoking cigarettes. She started smoking about 60 years ago. She has a 55.00 pack-year smoking history. She has never used smokeless tobacco.  Alcohol use:   reports no history of alcohol use. Currently lives in: [address] [place] [address]   reports no history of drug use.      COVID VACCINATION AND LAB RESULT RECORDS:     Internal Administration   First Dose COVID-19, airspace disease in the left lower lobe as well      Plan:     Diagnostic Workup:      Continue to follow  fever curve, WBC count and blood cultures. Continue to monitor blood counts, liver and renal function. Antimicrobials:    Continue IV meropenem 1 g every 8 hours until August 30, 2022  Continue inhaled tobramycin every 12 hours until August 30, 2022  Recommend oral probiotic twice daily  Continue to watch for worsening hypoxia  We will follow up on the culture results and clinical progress and will make further recommendations accordingly. Continue close vitals monitoring. Maintain good glycemic control. Fall precautions. Aspiration precautions. Continue to watch for new fever or diarrhea. DVT prophylaxis. Discussed all above with patient and RN. We will continue to follow the patient on a as needed basis from now on      Drug Monitoring:    Continue monitoring for antibiotic toxicity as follows: CBC, CMP   Continue to watch for following: new or worsening fever, new hypotension, hives, lip swelling and redness or purulence at vascular access sites. I/v access Management:    Continue to monitor i.v access sites for erythema, induration, discharge or tenderness. As always, continue efforts to minimize tubes/lines/drains as clinically appropriate to reduce chances of line associated infections. Patient education and counseling: The patient was educated in detail about the side-effects of various antibiotics and things to watch for like new rashes, lip swelling, severe reaction, worsening diarrhea, break through fever etc.  Discussed patient's condition and what to expect. All of the patient's questions were addressed in a satisfactory manner and patient verbalized understanding all instructions. Level of complexity of visit and medical decision making: High     Risk of Complications/Morbidity: High     Illness(es)/ Infection present that pose threat to life/bodily function. There is potential for severe exacerbation of infection/side effects of treatment. Therapy requires intensive monitoring for antimicrobial agent toxicity. TIME SPENT TODAY:     - Spent over  37 minutes on visit (including interval history, physical exam, review of data including labs, cultures, imaging, development and implementation of treatment plan and coordination of complex care). More than 50 percent of this includes face-to-face time spent with the patient for counseling and coordination of care. Thank you for involving me in the care of your patient. I will continue to follow. If you have anyadditional questions, please do not hesitate to contact me. Subjective: Interval history: Interval history was obtained from chart review and patient/ RN. The patient is afebrile. She seems to be tolerating antibiotics okay. She is on 4 L oxygen via high flow nasal cannula today. No hypotension. REVIEW OF SYSTEMS:     Review of Systems   Constitutional:  Positive for fatigue. Negative for chills, diaphoresis and fever. HENT:  Negative for ear discharge, ear pain, postnasal drip, rhinorrhea, sinus pressure, sinus pain and sore throat. Eyes:  Negative for discharge and redness. Respiratory:  Negative for cough, shortness of breath and wheezing. Cardiovascular:  Negative for chest pain and leg swelling. Gastrointestinal:  Negative for abdominal pain, constipation, diarrhea and nausea. Endocrine: Negative for cold intolerance, heat intolerance and polydipsia. Genitourinary:  Negative for dysuria, flank pain, frequency, hematuria and urgency. Musculoskeletal:  Negative for back pain and myalgias. Skin:  Negative for rash. Allergic/Immunologic: Negative for immunocompromised state. Neurological:  Negative for dizziness, seizures and headaches. Hematological:  Does not bruise/bleed easily. Psychiatric/Behavioral:  Negative for agitation, hallucinations and suicidal ideas. The patient is not nervous/anxious. All other systems reviewed and are negative. Past Medical History: All past medical history reviewed today. Past Medical History:   Diagnosis Date    CAD (coronary artery disease) 07/19/2022    NSTEMI, PCI LCx    Chronic pain syndrome     Percocet. Dr. Prater Lipoma    Colorectal polyps     COPD (chronic obstructive pulmonary disease) ([facility]     CTS (carpal tunnel syndrome)     BILATERAL    DDD (degenerative disc disease), lumbar     Depression     Family hx of colon cancer     Fibromyalgia     Hyperlipemia     IBS (irritable bowel syndrome)     Lumbar spondylosis     New onset type 2 diabetes mellitus ([facility] 02/03/2020    On home O2     4L    Pneumonia 07/2022    P.aer    Urticaria, chronic        Past Surgical History: All past surgical history was reviewed today. Past Surgical History:   Procedure Laterality Date    BRONCHOSCOPY N/A 8/3/2022    BRONCHOSCOPY performed by Angelia Holter, DO at [facility] Bilateral     CATARACT EXTRACTION      CERVICAL POLYP REMOVAL  09/2004    CORONARY ANGIOPLASTY WITH STENT PLACEMENT  07/19/2022    LCx 99. PCI/stent. INTRACAPSULAR CATARACT EXTRACTION Left 06/23/2020    Phacoemulsification with intraocular lens implant performed by Kimber Mckeon MD at [address] Right 07/14/2020    Phacoemulsification with intraocular lens implant performed by Kimber Mckeon MD at [address]      patient denies        Family History: All family history was reviewed today.         Problem Relation Age of Onset    Cancer Father         COLON     Alzheimer's Disease Mother     Heart Disease Brother     Heart Failure Brother     Diabetes Daughter     Diabetes Daughter     Diabetes Daughter        Objective:       PHYSICAL EXAM:      Vitals:   Vitals:    08/13/22 1400 08/13/22 1500 08/13/22 1600 08/13/22 1608   BP: 126/81 125/68 128/68    Pulse: 85 84 91 89   Resp: 24 21 27 22   Temp:   97.6 °F (36.4 °C)    TempSrc:   Oral    SpO2: 95% 95% 95% 95%   Weight:       Height:           Physical Exam  Vitals and nursing note reviewed. Constitutional:       Appearance: She is well-developed. She is not diaphoretic. Comments: The patient was seen earlier today. HENT:      Head: Normocephalic and atraumatic. Right Ear: External ear normal. There is no impacted cerumen. Left Ear: External ear normal. There is no impacted cerumen. Nose: Nose normal.      Mouth/Throat:      Mouth: Mucous membranes are moist.      Pharynx: Oropharynx is clear. No oropharyngeal exudate. Eyes:      General: No scleral icterus. Right eye: No discharge. Left eye: No discharge. Conjunctiva/sclera: Conjunctivae normal.      Pupils: Pupils are equal, round, and reactive to light. Neck:      Thyroid: No thyromegaly. Cardiovascular:      Rate and Rhythm: Normal rate and regular rhythm. Heart sounds: Normal heart sounds. No murmur heard. No friction rub. Pulmonary:      Effort: No respiratory distress. Breath sounds: No stridor. Rales (right upper lobe area) present. No wheezing. Abdominal:      General: Bowel sounds are normal.      Palpations: Abdomen is soft. Tenderness: There is no abdominal tenderness. There is no guarding or rebound. Musculoskeletal:         General: No swelling, tenderness or deformity. Normal range of motion. Cervical back: Normal range of motion and neck supple. Right lower leg: No edema. Left lower leg: No edema. Lymphadenopathy:      Cervical: No cervical adenopathy. Skin:     General: Skin is warm and dry. Coloration: Skin is not jaundiced. Findings: No bruising, erythema or rash. Neurological:      General: No focal deficit present. Mental Status: She is alert and oriented to person, place, and time.  Mental status is at baseline. Motor: No abnormal muscle tone. Psychiatric:         Mood and Affect: Mood normal.         Behavior: Behavior normal.          Lines and drains: All vascular access sites are healthy with no local erythema, discharge or tenderness. Intake and output:    I/O last 3 completed shifts: In: 1309.6 [P.O.:690; I.V.:152.7; Other:10; IV Piggyback:456.9]  Out: 1550 [Urine:1550]    Lab Data:   All available labs and old records have been reviewed by me. CBC:  Recent Labs     08/11/22 0756 08/12/22 0316 08/13/22 0418   WBC 9.8 13.0* 10.8   RBC 2.94* 3.11* 2.93*   HGB 8.9* 9.0* 8.6*   HCT 26.0* 27.5* 25.7*   * 752* 662*   MCV 88.6 88.5 87.7   MCH 30.5 28.8 29.3   MCHC 34.4 32.6 33.5   RDW 14.9 15.0 15.3        BMP:  Recent Labs     08/11/22 0756 08/12/22 0316 08/13/22 0417   * 136 135*   K 4.8 4.1 4.0   CL 93* 94* 96*   CO2 33* 33* 33*   BUN 13 15 12   CREATININE <0.5* <0.5* <0.5*   CALCIUM 9.3 9.0 8.7   GLUCOSE 132* 134* 94        Hepatic Function Panel:   Lab Results   Component Value Date/Time    ALKPHOS 88 07/18/2022 05:20 PM    ALT 65 07/18/2022 05:20 PM    AST 61 07/18/2022 05:20 PM    PROT 5.6 07/18/2022 05:20 PM    PROT 6.9 10/12/2012 11:00 AM    BILITOT 0.5 07/18/2022 05:20 PM    LABALBU 2.4 07/18/2022 05:20 PM       CPK: No results found for: CKTOTAL  ESR: No results found for: SEDRATE  CRP: No results found for: CRP        Imaging: All pertinent images and reports for the current visit were reviewed by me during this visit. I reviewed the chest x-ray/CT scan/MRI images and independently interpreted the findings and results today. CT CHEST WO CONTRAST   Final Result   1. Multifocal pneumonia, primary in the right lung, with areas of improvement   since 07/23/2022. Right upper lobe pulmonary abscess is persistent, mildly   decreased with small amount of fluid in the interval.   2. Few foci of airspace disease in the left lower lobe are mildly increased.    3. Persistent small right pleural effusion. 4. Stable mediastinal adenopathy, likely reactive in the setting. Fluoroscopy modified barium swallow with video   Final Result   No evidence of aspiration. Please see separate speech pathology report for full discussion of findings   and recommendations. RECOMMENDATIONS:   Unavailable         XR CHEST PORTABLE   Final Result   Left-sided PICC line tip is in the superior vena cava, the tip is turned   upward superiorly, a change from priors. Moderate right pleural effusion and right lung consolidations similar to   prior study. XR CHEST PORTABLE   Final Result   Increasing opacity throughout portions of the right lung, which can reflect   combination of airspace disease, atelectasis, and pleural effusion. XR CHEST PORTABLE   Final Result   Endotracheal tube with the tip approximately 1 cm above the song. Consider   retraction by approximately 3.5 cm. Enteric tube with the tip and the side   hole below the diaphragm overlying the left upper abdomen, likely within the   fundus of the stomach. Redemonstration of multifocal pneumonia affecting the right lung. Small   right pleural effusion. XR CHEST PORTABLE   Final Result   Unchanged multifocal right-sided pneumonia. XR CHEST PORTABLE   Final Result   Stable multifocal airspace disease in the right lung with probable upper lobe   abscess. XR CHEST PORTABLE   Final Result   Stable multifocal airspace disease in the right lung, including probable   pulmonary abscess in the right upper lobe. CT CHEST WO CONTRAST   Final Result   1. Mixed consolidative and ground-glass opacities as well as interstitial   thickening throughout the majority of the right lung compatible with   pneumonia. 2. There is a cavity in the right upper lobe demonstrating an air-fluid level   measuring up to 9.7 cm concerning for abscess formation.    3. Trace right pleural effusion. 4. Prominent and enlarged mediastinal lymph nodes, likely reactive. 5. Stable 6 mm left lower lobe pulmonary nodule. RECOMMENDATIONS:   Fleischner Society guidelines for follow-up and management of incidentally   detected pulmonary nodules:      Single Solid Nodule:      Nodule size less than 6 mm   In a low-risk patient, no routine follow-up. In a high-risk patient, optional CT at 12 months. Nodule size equals 6-8 mm   In a low-risk patient, CT at 6-12 months, then consider CT at 18-24 months. In a high-risk patient, CT at 6-12 months, then CT at 18-24 months. Nodule size greater than 8 mm         In a low-risk patient, consider CT at 3 months, PET/CT, or tissue sampling. In a high-risk patient, consider CT at 3 months, PET/CT, or tissue sampling. Multiple Solid Nodules:      Nodule size less than 6 mm   In a low-risk patient, no routine follow-up. In a high-risk patient, optional CT at 12 months. Nodule size equals 6-8 mm   In a low-risk patient, CT at 3-6 months, then consider CT at 18-24 months. In a high-risk patient, CT at 3-6 months, then CT at 18-24 months. Nodule size greater than 8 mm   In a low-risk patient, CT at 3-6 months, then consider CT at 18-24 months. In a high-risk patient, CT at 3-6 months, then CT at 18-24 months. - Low risk patients include individuals with minimal or absent history of   smoking and other known risk factors. - High risk patients include individuals with a history or smoking or known   risk factors. Radiology 2017 http://pubs. rsna.org/doi/full/10.1148/radiol. 0285706996         XR CHEST PORTABLE   Final Result   1. Increased pneumonia throughout the right lung remaining most prominent in   the right upper lobe. 2. Emphysema better demonstrated on CT. 3. Possible trace right pleural effusion.          XR CHEST PORTABLE   Final Result   Improved aeration of the right chest with persistent consolidation in the mid   to upper right chest.         CT CHEST WO CONTRAST    (Results Pending)       Medications: All current and past medications were reviewed.      metoprolol succinate  12.5 mg Oral Daily    tobramycin (PF)  300 mg Nebulization BID    clopidogrel  75 mg Oral Daily    methylPREDNISolone  40 mg IntraVENous Daily    pantoprazole  40 mg Oral QAM AC    ipratropium-albuterol  1 ampule Inhalation 4x daily    aspirin  325 mg Oral Once    aspirin  81 mg Oral Daily    sodium chloride (Inhalant)  15 mL Nebulization Q4H While awake    [Held by provider] enoxaparin  40 mg SubCUTAneous Daily    meropenem  1,000 mg IntraVENous Q8H    nystatin  5 mL Oral 4x Daily    sodium chloride flush  5-40 mL IntraVENous 2 times per day    atorvastatin  40 mg Oral Nightly    tiZANidine  4 mg Oral Nightly    gabapentin  600 mg Oral BID    DULoxetine  60 mg Oral BID    mometasone-formoterol  2 puff Inhalation BID        sodium chloride Stopped (08/11/22 1756)    norepinephrine Stopped (08/12/22 0507)    dextrose         traMADol, guaiFENesin-dextromethorphan, sodium chloride, calcium carbonate, potassium chloride, sodium chloride, sodium chloride flush, acetaminophen, perflutren lipid microspheres, traZODone, ipratropium-albuterol, cetirizine, albuterol sulfate HFA, glucose, dextrose bolus **OR** dextrose bolus, glucagon (rDNA), dextrose, ondansetron, polyethylene glycol      Problem list:       Patient Active Problem List   Diagnosis Code    [facility]advised tx M85.80    Colon polyp, hyperplastic,-(done 3/11-repeat 3-5 yrs--dr Lady Kent) K63.5    Family history of colon cancer Z80.0    CTS (carpal tunnel syndrome)BILATERAL-s/p surgical repair--resolved  G56.00    Postmenopausal status-last mammogram 2/15 wnl last pap 12/13--wnl Z78.0    Nondependent alcohol abuse, in remission F10.11    Urticaria, chronic L50.8    Smoking F17.200    Chronic back pain-(djd) saw dr Diana stubbs surgery--seeing dr Jono Worthy for pain meds M54.9, G89.29    Fibromyalgia M79.7    Hypercholesteremia E78.00    Lumbar spondylosis M47.816    Vitamin D deficiency--severe--started 50,000 iu 2x/ wk 11/13 E55.9    Insomnia--on elevil w help G47.00    Constipation--on daily miralax K59.00    Depression F32. A    Chronic pain syndrome G89.4    Muscle cramping R25.2    Acute on chronic respiratory failure with hypercapnia ([facility]) J96.22    [facility]OL[facility]acute kidney [facility]) [facility] N17.9    Severe sepsis ([facility]) A41.9, R65.20    Gastroesophageal reflux disease K21.9    COPD, severe ([facility]) J44.9    Chronic hypoxemic respiratory failure ([facility]) J96.11    Degeneration of lumbar or lumbosacral intervertebral disc M51.37    Trochanteric bursitis of right hip M70.61    COPD exacerbation ([facility]) J44.1    Diabetes ([facility] E11.9    Controlled type 2 diabetes mellitus without complication, without long-term current use of insulin ([facility]) E11.9    Age-related osteoporosis without current pathological fracture- started alendronate 9/2021 M81.0    Pulmonary nodule seen on imaging study R91.1    Pneumonia of right lung due to infectious organism J18.9    General weakness R53.1    Elevated lactic acid level R79.89    Cavitary pneumonia J18.9, J98.4    Chronic obstructive pulmonary disease ([facility]) J44.9    Acute respiratory failure with hypoxia ([facility]) J96.01    Acute on chronic respiratory failure with hypoxia and hypercapnia ([facility]) J96.21, J96.22    Abnormal EKG R94.31    NSTEMI (non-ST elevated myocardial infarction) ([facility]) I21.4    Necrotizing pneumonia ([facility]) J85.0    Abscess of upper lobe of right lung with pneumonia ([facility]) J85.1    Pseudomonas respiratory infection J98.8, B96.5    Abnormal CT of the chest R93.89    Peripherally inserted central catheter (PICC) in place Z45.2    Massive hemoptysis R04.2    Epistaxis R04.0    Acute respiratory insufficiency R06.89    Acute blood loss anemia D62    Hypotension due to drugs I95.2       Please note that this chart was generated using Dragon dictation software. Although every effort was made to ensure the accuracy of this automated transcription, some errors in transcription may have occurred inadvertently. If you may need any clarification, please do not hesitate to contact me through EPIC or at the phone number provided below with my electronic signature. Any pictures or media included in this note were obtained after taking informed verbal consent from the patient and with their approval to include those in the patient's medical record. Cody Zaman MD, MPH, CATRACH[facility] [place]  8/13/2022, 4:46 PM  [facility]   [address] [place] [address], [address]  Office: [phone]  Fax: [phone]  In-person Clinic days:  Tuesday & Thursday a.m. Virtual clinic days: Monday, Wednesday & Friday a.m.

## 2022-08-14 LAB
ANION GAP SERPL CALCULATED.3IONS-SCNC: 7 MMOL/L (ref 3–16)
BUN BLDV-MCNC: 14 MG/DL (ref 7–20)
CALCIUM SERPL-MCNC: 8.4 MG/DL (ref 8.3–10.6)
CHLORIDE BLD-SCNC: 96 MMOL/L (ref 99–110)
CO2: 30 MMOL/L (ref 21–32)
CREAT SERPL-MCNC: <0.5 MG/DL (ref 0.6–1.2)
GFR AFRICAN AMERICAN: >60
GFR NON-AFRICAN AMERICAN: >60
GLUCOSE BLD-MCNC: 92 MG/DL (ref 70–99)
MAGNESIUM: 2.1 MG/DL (ref 1.8–2.4)
PHOSPHORUS: 2.6 MG/DL (ref 2.5–4.9)
POTASSIUM REFLEX MAGNESIUM: 3.4 MMOL/L (ref 3.5–5.1)
SODIUM BLD-SCNC: 133 MMOL/L (ref 136–145)

## 2022-08-14 PROCEDURE — 6370000000 HC RX 637 (ALT 250 FOR IP): Performed by: INTERNAL MEDICINE

## 2022-08-14 PROCEDURE — 6370000000 HC RX 637 (ALT 250 FOR IP): Performed by: STUDENT IN AN ORGANIZED HEALTH CARE EDUCATION/TRAINING PROGRAM

## 2022-08-14 PROCEDURE — 6370000000 HC RX 637 (ALT 250 FOR IP): Performed by: NURSE PRACTITIONER

## 2022-08-14 PROCEDURE — 2700000000 HC OXYGEN THERAPY PER DAY

## 2022-08-14 PROCEDURE — 94640 AIRWAY INHALATION TREATMENT: CPT

## 2022-08-14 PROCEDURE — 2580000003 HC RX 258: Performed by: INTERNAL MEDICINE

## 2022-08-14 PROCEDURE — 6360000002 HC RX W HCPCS: Performed by: INTERNAL MEDICINE

## 2022-08-14 PROCEDURE — 80048 BASIC METABOLIC PNL TOTAL CA: CPT

## 2022-08-14 PROCEDURE — 94669 MECHANICAL CHEST WALL OSCILL: CPT

## 2022-08-14 PROCEDURE — 94761 N-INVAS EAR/PLS OXIMETRY MLT: CPT

## 2022-08-14 PROCEDURE — 2060000000 HC ICU INTERMEDIATE R&B

## 2022-08-14 PROCEDURE — 84100 ASSAY OF PHOSPHORUS: CPT

## 2022-08-14 PROCEDURE — 99232 SBSQ HOSP IP/OBS MODERATE 35: CPT | Performed by: INTERNAL MEDICINE

## 2022-08-14 PROCEDURE — 83735 ASSAY OF MAGNESIUM: CPT

## 2022-08-14 PROCEDURE — 36592 COLLECT BLOOD FROM PICC: CPT

## 2022-08-14 RX ORDER — METHYLPREDNISOLONE SODIUM SUCCINATE 40 MG/ML
40 INJECTION, POWDER, LYOPHILIZED, FOR SOLUTION INTRAMUSCULAR; INTRAVENOUS DAILY
Status: DISCONTINUED | OUTPATIENT
Start: 2022-08-15 | End: 2022-08-16 | Stop reason: HOSPADM

## 2022-08-14 RX ADMIN — NYSTATIN 500000 UNITS: 100000 SUSPENSION ORAL at 17:05

## 2022-08-14 RX ADMIN — TOBRAMYCIN 300 MG: 300 SOLUTION RESPIRATORY (INHALATION) at 20:44

## 2022-08-14 RX ADMIN — IPRATROPIUM BROMIDE AND ALBUTEROL SULFATE 1 AMPULE: 2.5; .5 SOLUTION RESPIRATORY (INHALATION) at 07:36

## 2022-08-14 RX ADMIN — SODIUM CHLORIDE SOLN NEBU 3% 15 ML: 3 NEBU SOLN at 16:48

## 2022-08-14 RX ADMIN — MOMETASONE FUROATE AND FORMOTEROL FUMARATE DIHYDRATE 2 PUFF: 200; 5 AEROSOL RESPIRATORY (INHALATION) at 07:41

## 2022-08-14 RX ADMIN — MEROPENEM 1000 MG: 1 INJECTION, POWDER, FOR SOLUTION INTRAVENOUS at 21:15

## 2022-08-14 RX ADMIN — MOMETASONE FUROATE AND FORMOTEROL FUMARATE DIHYDRATE 2 PUFF: 200; 5 AEROSOL RESPIRATORY (INHALATION) at 07:39

## 2022-08-14 RX ADMIN — SODIUM CHLORIDE, PRESERVATIVE FREE 10 ML: 5 INJECTION INTRAVENOUS at 21:11

## 2022-08-14 RX ADMIN — DULOXETINE HYDROCHLORIDE 60 MG: 60 CAPSULE, DELAYED RELEASE ORAL at 10:49

## 2022-08-14 RX ADMIN — NYSTATIN 500000 UNITS: 100000 SUSPENSION ORAL at 10:50

## 2022-08-14 RX ADMIN — GABAPENTIN 600 MG: 300 CAPSULE ORAL at 10:49

## 2022-08-14 RX ADMIN — SODIUM CHLORIDE SOLN NEBU 3% 15 ML: 3 NEBU SOLN at 20:44

## 2022-08-14 RX ADMIN — SODIUM CHLORIDE SOLN NEBU 3% 15 ML: 3 NEBU SOLN at 12:31

## 2022-08-14 RX ADMIN — METOPROLOL SUCCINATE 12.5 MG: 25 TABLET, EXTENDED RELEASE ORAL at 10:49

## 2022-08-14 RX ADMIN — ATORVASTATIN CALCIUM 40 MG: 40 TABLET, FILM COATED ORAL at 21:11

## 2022-08-14 RX ADMIN — MEROPENEM 1000 MG: 1 INJECTION, POWDER, FOR SOLUTION INTRAVENOUS at 06:54

## 2022-08-14 RX ADMIN — IPRATROPIUM BROMIDE AND ALBUTEROL SULFATE 1 AMPULE: 2.5; .5 SOLUTION RESPIRATORY (INHALATION) at 16:48

## 2022-08-14 RX ADMIN — MEROPENEM 1000 MG: 1 INJECTION, POWDER, FOR SOLUTION INTRAVENOUS at 15:10

## 2022-08-14 RX ADMIN — ASPIRIN 81 MG: 81 TABLET, CHEWABLE ORAL at 10:49

## 2022-08-14 RX ADMIN — NYSTATIN 500000 UNITS: 100000 SUSPENSION ORAL at 21:11

## 2022-08-14 RX ADMIN — MOMETASONE FUROATE AND FORMOTEROL FUMARATE DIHYDRATE 2 PUFF: 200; 5 AEROSOL RESPIRATORY (INHALATION) at 20:44

## 2022-08-14 RX ADMIN — NYSTATIN 500000 UNITS: 100000 SUSPENSION ORAL at 14:31

## 2022-08-14 RX ADMIN — METHYLPREDNISOLONE SODIUM SUCCINATE 40 MG: 40 INJECTION, POWDER, FOR SOLUTION INTRAMUSCULAR; INTRAVENOUS at 10:49

## 2022-08-14 RX ADMIN — CLOPIDOGREL BISULFATE 75 MG: 75 TABLET ORAL at 10:49

## 2022-08-14 RX ADMIN — DULOXETINE HYDROCHLORIDE 60 MG: 60 CAPSULE, DELAYED RELEASE ORAL at 21:11

## 2022-08-14 RX ADMIN — TOBRAMYCIN 300 MG: 300 SOLUTION RESPIRATORY (INHALATION) at 07:43

## 2022-08-14 RX ADMIN — PANTOPRAZOLE SODIUM 40 MG: 40 TABLET, DELAYED RELEASE ORAL at 06:54

## 2022-08-14 RX ADMIN — SODIUM CHLORIDE, PRESERVATIVE FREE 10 ML: 5 INJECTION INTRAVENOUS at 10:53

## 2022-08-14 RX ADMIN — GABAPENTIN 600 MG: 300 CAPSULE ORAL at 21:10

## 2022-08-14 RX ADMIN — SODIUM CHLORIDE SOLN NEBU 3% 4 ML: 3 NEBU SOLN at 07:36

## 2022-08-14 RX ADMIN — TIZANIDINE 4 MG: 4 TABLET ORAL at 21:11

## 2022-08-14 RX ADMIN — IPRATROPIUM BROMIDE AND ALBUTEROL SULFATE 1 AMPULE: 2.5; .5 SOLUTION RESPIRATORY (INHALATION) at 20:43

## 2022-08-14 RX ADMIN — IPRATROPIUM BROMIDE AND ALBUTEROL SULFATE 1 AMPULE: 2.5; .5 SOLUTION RESPIRATORY (INHALATION) at 12:30

## 2022-08-14 ASSESSMENT — ENCOUNTER SYMPTOMS
RHINORRHEA: 0
EYE REDNESS: 0
BACK PAIN: 0
DIARRHEA: 0
ABDOMINAL PAIN: 0
SINUS PAIN: 0
EYE DISCHARGE: 0
COUGH: 0
SINUS PRESSURE: 0
SORE THROAT: 0
SHORTNESS OF BREATH: 0
NAUSEA: 0
WHEEZING: 0
CONSTIPATION: 0

## 2022-08-14 ASSESSMENT — PAIN SCALES - GENERAL
PAINLEVEL_OUTOF10: 0
PAINLEVEL_OUTOF10: 8
PAINLEVEL_OUTOF10: 0
PAINLEVEL_OUTOF10: 0

## 2022-08-14 ASSESSMENT — PAIN DESCRIPTION - DESCRIPTORS: DESCRIPTORS: ACHING;DISCOMFORT

## 2022-08-14 ASSESSMENT — PAIN DESCRIPTION - LOCATION: LOCATION: BACK

## 2022-08-14 ASSESSMENT — PAIN - FUNCTIONAL ASSESSMENT: PAIN_FUNCTIONAL_ASSESSMENT: ACTIVITIES ARE NOT PREVENTED

## 2022-08-14 ASSESSMENT — PAIN SCALES - WONG BAKER: WONGBAKER_NUMERICALRESPONSE: 2

## 2022-08-14 NOTE — PLAN OF CARE
Problem: Safety - Adult  Goal: Free from fall injury  8/13/2022 1057 by Korey Ramos RN  Outcome: Progressing     Problem: ABCDS Injury Assessment  Goal: Absence of physical injury  8/13/2022 1057 by Korey Ramos RN  Outcome: Progressing     Problem: Chronic Conditions and Co-morbidities  Goal: Patient's chronic conditions and co-morbidity symptoms are monitored and maintained or improved  8/13/2022 1057 by Korey Ramos RN  Outcome: Progressing

## 2022-08-14 NOTE — PLAN OF CARE
Problem: Discharge Planning  Goal: Discharge to home or other facility with appropriate resources  Outcome: Progressing     Problem: Pain  Goal: Verbalizes/displays adequate comfort level or baseline comfort level  Outcome: Progressing     Problem: Confusion  Goal: Confusion, delirium, dementia, or psychosis is improved or at baseline  Description: INTERVENTIONS:  1. Assess for possible contributors to thought disturbance, including medications, impaired vision or hearing, underlying metabolic abnormalities, dehydration, psychiatric diagnoses, and notify attending LIP  2. [place] high risk fall precautions, as indicated  3. Provide frequent short contacts to provide reality reorientation, refocusing and direction  4. Decrease environmental stimuli, including noise as appropriate  5. Monitor and intervene to maintain adequate nutrition, hydration, elimination, sleep and activity  6. If unable to ensure safety without constant attention obtain sitter and review sitter guidelines with assigned personnel  7. Initiate Psychosocial CNS and Spiritual Care consult, as indicated  Outcome: Progressing     Problem: Skin/Tissue Integrity  Goal: Absence of new skin breakdown  Description: 1. Monitor for areas of redness and/or skin breakdown  2. Assess vascular access sites hourly  3. Every 4-6 hours minimum:  Change oxygen saturation probe site  4. Every 4-6 hours:  If on nasal continuous positive airway pressure, respiratory therapy assess nares and determine need for appliance change or resting period.   Outcome: Progressing     Problem: Safety - Adult  Goal: Free from fall injury  Outcome: Progressing     Problem: ABCDS Injury Assessment  Goal: Absence of physical injury  Outcome: Progressing     Problem: Chronic Conditions and Co-morbidities  Goal: Patient's chronic conditions and co-morbidity symptoms are monitored and maintained or improved  Outcome: Progressing     Problem: Nutrition Deficit:  Goal: Optimize nutritional status  Outcome: Progressing     Problem: Safety - Medical Restraint  Goal: Remains free of injury from restraints (Restraint for Interference with Medical Device)  Description: INTERVENTIONS:  1. Determine that other, less restrictive measures have been tried or would not be effective before applying the restraint  2. Evaluate the patient's condition at the time of restraint application  3. Inform patient/family regarding the reason for restraint  4.  Q2H: Monitor safety, psychosocial status, comfort, nutrition and hydration  Outcome: Progressing

## 2022-08-14 NOTE — PROGRESS NOTES
PERRL. Anicteric sclera. No conjunctival injection. ENT: No discharge. Posterior oropharynx clear. External appearance of ears and nose normal.  Neck: Trachea midline. No mass   Resp:  Moderate rhonchi and diminished on the right, no wheezing, mild increase wob  CV: Regular rate. Regular rhythm. No murmur or rub. No edema. GI: Soft, Non-tender. Non-distended. +BS  Skin: Warm, dry, w/o erythema. Lymph: No cervical or supraclavicular LAD. M/S: No cyanosis. No clubbing. Neuro:  CN 2-12 tested, no focal neurologic deficit. Moves all extremities  Psych: Awake and alert, Oriented x 3. Judgement and insight appropriate. Mood stable. Laboratory findings:-    CBC:   Recent Labs     08/13/22  0418   WBC 10.8   HGB 8.6*   *     BMP:    Recent Labs     08/13/22  0417 08/14/22  0450   * 133*   K 4.0 3.4*   CL 96* 96*   CO2 33* 30   BUN 12 14   CREATININE <0.5* <0.5*   GLUCOSE 94 92     S. Calcium:  Recent Labs     08/14/22  0450   CALCIUM 8.4         Radiology Review:  Pertinent images / reports were reviewed as a part of this visit.   CXR reviewed extensive right airspace disease                     Shaheen Bhatt MD, M.D.            8/15/2022, 10:42 AM

## 2022-08-14 NOTE — PROGRESS NOTES
Hospitalist Progress Note      PCP: HECTOR Stringer CNP    Date of Admission: 7/18/2022    Chief Complaint: respiratory distress    Hospital Course:    Pt is an 78y.o. year-old female with a history of hyperlipidemia, fibromyalgia, chronic pain syndrome, severe COPD with chronic hypoxic respiratory failure requiring oxygen at 4 L/min via nasal cannula continuously. She presents to the emergency room for evaluation following a 2 to 3-day history of worsening shortness of breath. She was recently treated for Pseudomonas pneumonia and discharged to rehab. EMS reports that she was on a very long oxygen tube when they arrived. She was placed on 6 L nasal cannula and her oxygen saturation improved. In the emergency room she was found to have an abnormal EKG with inverted T waves in V2 through V5. Cardiology was consulted and asked that she be put on a heparin drip. Patient denies any current or recent chest pain. She is being admitted for further evaluation and treatment. Associated signs and symptoms do not include chest pain, lightheaded, dizziness, diaphoresis, edema, orthopnea, paroxysmal nocturnal dyspnea, fever or chills. No recent medication changes. 8/10  Patient continues to require oxygen per nonrebreather, 12 L. Intermittent need of vasopressors. 8/11  Patient appears to be doing well, tolerating nasal cannula, 10 L. LTAC placement is pending, due to significant O2 requirements unable to go to regular nursing facility. 8/12  Appears to be dong better, saturating well on 6 L NC. Diet upgraded to a regular diet with supplements. She is still requiring levophed; therefore, unable to go to floor     8/13  Patient remains on 5 L O2 per nasal cannula. Reports pleuritic chest pain on right hemithorax.   Tramadol as needed added    8/14  Remains on 5 L O2 per nasal cannula     Subjective:  as above    Medications:  Reviewed    Infusion Medications    sodium chloride 5 mL/hr at 08/14/22 0652    norepinephrine Stopped (08/12/22 0507)    dextrose       Scheduled Medications    lactobacillus  1 capsule Oral Daily with breakfast    sodium chloride (Inhalant)  15 mL Nebulization Q4H WA    metoprolol succinate  12.5 mg Oral Daily    tobramycin (PF)  300 mg Nebulization BID    clopidogrel  75 mg Oral Daily    pantoprazole  40 mg Oral QAM AC    ipratropium-albuterol  1 ampule Inhalation 4x daily    aspirin  325 mg Oral Once    aspirin  81 mg Oral Daily    [Held by provider] enoxaparin  40 mg SubCUTAneous Daily    meropenem  1,000 mg IntraVENous Q8H    nystatin  5 mL Oral 4x Daily    sodium chloride flush  5-40 mL IntraVENous 2 times per day    atorvastatin  40 mg Oral Nightly    tiZANidine  4 mg Oral Nightly    gabapentin  600 mg Oral BID    DULoxetine  60 mg Oral BID    mometasone-formoterol  2 puff Inhalation BID     PRN Meds: traMADol, guaiFENesin-dextromethorphan, sodium chloride, calcium carbonate, potassium chloride, sodium chloride, sodium chloride flush, acetaminophen, perflutren lipid microspheres, traZODone, ipratropium-albuterol, cetirizine, albuterol sulfate HFA, glucose, dextrose bolus **OR** dextrose bolus, glucagon (rDNA), dextrose, ondansetron, polyethylene glycol      Intake/Output Summary (Last 24 hours) at 8/14/2022 1135  Last data filed at 8/14/2022 1053  Gross per 24 hour   Intake 174.61 ml   Output 1350 ml   Net -1175.39 ml         Exam:    BP (!) 169/81   Pulse 90   Temp 98.5 °F (36.9 °C) (Oral)   Resp 23   Ht 5' 4\" (1.626 m)   Wt 116 lb 6.5 oz (52.8 kg)   SpO2 92%   BMI 19.98 kg/m²     General appearance: Anxious+, appears stated age and cooperative. HEENT: Pupils equal, round, and reactive to light. Conjunctivae/corneas clear. Neck: Supple, with full range of motion. No jugular venous distention. Trachea midline. Respiratory:  Comfortably on Bipap. Crackles bibasally.   Cardiovascular: Regular rate and rhythm with normal S1/S2 without murmurs, rubs or gallops. Abdomen: Soft, non-tender, non-distended with normal bowel sounds. Musculoskeletal: No clubbing, cyanosis or edema bilaterally. Full range of motion without deformity. Skin: Skin color, texture, turgor normal.  No rashes or lesions. Neurologic:  Neurovascularly intact without any focal sensory/motor deficits. Cranial nerves: II-XII intact, grossly non-focal.  Psychiatric: Alert and oriented, thought content appropriate, normal insight  Capillary Refill: Brisk,< 3 seconds   Peripheral Pulses: +2 palpable, equal bilaterally       Labs:   Recent Labs     08/12/22 0316 08/13/22 0418   WBC 13.0* 10.8   HGB 9.0* 8.6*   HCT 27.5* 25.7*   * 662*       Recent Labs     08/12/22 0316 08/13/22 0417 08/14/22  0450    135* 133*   K 4.1 4.0 3.4*   CL 94* 96* 96*   CO2 33* 33* 30   BUN 15 12 14   CREATININE <0.5* <0.5* <0.5*   CALCIUM 9.0 8.7 8.4   PHOS 2.9 2.6 2.6       No results for input(s): AST, ALT, BILIDIR, BILITOT, ALKPHOS in the last 72 hours. No results for input(s): INR in the last 72 hours. No results for input(s): Linda Dajuan in the last 72 hours. Urinalysis:      Lab Results   Component Value Date/Time    NITRU Negative 07/22/2022 06:31 AM    WBCUA 2 07/22/2022 06:31 AM    BACTERIA None Seen 07/22/2022 06:31 AM    RBCUA 5 07/22/2022 06:31 AM    BLOODU Negative 07/22/2022 06:31 AM    SPECGRAV 1.049 07/22/2022 06:31 AM    GLUCOSEU Negative 07/22/2022 06:31 AM       Radiology:  CT CHEST WO CONTRAST   Final Result   1. Multifocal pneumonia, primary in the right lung, with areas of improvement   since 07/23/2022. Right upper lobe pulmonary abscess is persistent, mildly   decreased with small amount of fluid in the interval.   2. Few foci of airspace disease in the left lower lobe are mildly increased. 3. Persistent small right pleural effusion. 4. Stable mediastinal adenopathy, likely reactive in the setting.          Fluoroscopy modified barium swallow with video   Final Result   No evidence of aspiration. Please see separate speech pathology report for full discussion of findings   and recommendations. RECOMMENDATIONS:   Unavailable         XR CHEST PORTABLE   Final Result   Left-sided PICC line tip is in the superior vena cava, the tip is turned   upward superiorly, a change from priors. Moderate right pleural effusion and right lung consolidations similar to   prior study. XR CHEST PORTABLE   Final Result   Increasing opacity throughout portions of the right lung, which can reflect   combination of airspace disease, atelectasis, and pleural effusion. XR CHEST PORTABLE   Final Result   Endotracheal tube with the tip approximately 1 cm above the song. Consider   retraction by approximately 3.5 cm. Enteric tube with the tip and the side   hole below the diaphragm overlying the left upper abdomen, likely within the   fundus of the stomach. Redemonstration of multifocal pneumonia affecting the right lung. Small   right pleural effusion. XR CHEST PORTABLE   Final Result   Unchanged multifocal right-sided pneumonia. XR CHEST PORTABLE   Final Result   Stable multifocal airspace disease in the right lung with probable upper lobe   abscess. XR CHEST PORTABLE   Final Result   Stable multifocal airspace disease in the right lung, including probable   pulmonary abscess in the right upper lobe. CT CHEST WO CONTRAST   Final Result   1. Mixed consolidative and ground-glass opacities as well as interstitial   thickening throughout the majority of the right lung compatible with   pneumonia. 2. There is a cavity in the right upper lobe demonstrating an air-fluid level   measuring up to 9.7 cm concerning for abscess formation. 3. Trace right pleural effusion. 4. Prominent and enlarged mediastinal lymph nodes, likely reactive. 5. Stable 6 mm left lower lobe pulmonary nodule.       RECOMMENDATIONS:   Fleischner Society guidelines for follow-up and management of incidentally   detected pulmonary nodules:      Single Solid Nodule:      Nodule size less than 6 mm   In a low-risk patient, no routine follow-up. In a high-risk patient, optional CT at 12 months. Nodule size equals 6-8 mm   In a low-risk patient, CT at 6-12 months, then consider CT at 18-24 months. In a high-risk patient, CT at 6-12 months, then CT at 18-24 months. Nodule size greater than 8 mm         In a low-risk patient, consider CT at 3 months, PET/CT, or tissue sampling. In a high-risk patient, consider CT at 3 months, PET/CT, or tissue sampling. Multiple Solid Nodules:      Nodule size less than 6 mm   In a low-risk patient, no routine follow-up. In a high-risk patient, optional CT at 12 months. Nodule size equals 6-8 mm   In a low-risk patient, CT at 3-6 months, then consider CT at 18-24 months. In a high-risk patient, CT at 3-6 months, then CT at 18-24 months. Nodule size greater than 8 mm   In a low-risk patient, CT at 3-6 months, then consider CT at 18-24 months. In a high-risk patient, CT at 3-6 months, then CT at 18-24 months. - Low risk patients include individuals with minimal or absent history of   smoking and other known risk factors. - High risk patients include individuals with a history or smoking or known   risk factors. Radiology 2017 http://pubs. rsna.org/doi/full/10.1148/radiol. 6867643682         XR CHEST PORTABLE   Final Result   1. Increased pneumonia throughout the right lung remaining most prominent in   the right upper lobe. 2. Emphysema better demonstrated on CT. 3. Possible trace right pleural effusion.          XR CHEST PORTABLE   Final Result   Improved aeration of the right chest with persistent consolidation in the mid   to upper right chest.         CT CHEST WO CONTRAST    (Results Pending)           Assessment/Plan:    Active Hospital Problems    Diagnosis Date Noted    Hypoxia [R09.02] 08/13/2022     Priority: Medium    Acute respiratory insufficiency [R06.89] 08/04/2022     Priority: Medium    Acute blood loss anemia [D62] 08/04/2022     Priority: Medium    Hypotension due to drugs [I95.2] 08/04/2022     Priority: Medium    Massive hemoptysis [R04.2] 08/03/2022     Priority: Medium    Epistaxis [R04.0] 08/03/2022     Priority: Medium    Peripherally inserted central catheter (PICC) in place [Z45.2] 07/31/2022     Priority: Medium    NSTEMI (non-ST elevated myocardial infarction) ([address] [I21.4] 07/26/2022     Priority: Medium    Necrotizing pneumonia ([address] [J85.0] 07/26/2022     Priority: Medium    Abscess of upper lobe of right lung with pneumonia ([address] [J85.1] 07/26/2022     Priority: Medium    Pseudomonas respiratory infection [J98.8, B96.5] 07/26/2022     Priority: Medium    Abnormal CT of the chest [R93.89] 07/26/2022     Priority: Medium    Acute on chronic respiratory failure with hypoxia and hypercapnia ([address] [J96.21, J96.22] 07/18/2022     Priority: Medium    Abnormal EKG [R94.31] 07/18/2022     Priority: Medium    Chronic obstructive pulmonary disease ([address] [J44.9]      Priority: Medium    Cavitary pneumonia [J18.9, J98.4]      Priority: Medium    Pneumonia of right lung due to infectious organism [J18.9] 07/03/2022     Priority: Medium    COPD, severe ([address] [J44.9] 02/24/2017    Chronic pain syndrome [G89.4] 03/14/2016    Hypercholesteremia [E78.00] 11/04/2013    Fibromyalgia [M79.7]     Smoking [F17.200] 08/27/2011     Acute on chronic hypoxic respiratory failure   -Now on nasal cannula 6 L  -Try to wean O2 down to regular nasal cannula tolerated be discharged home  -Solu-Medrol started, for 5 days    2. 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 in situ   -CT surgery consulted:  would need pneumonectomy for which she is a poor candidate --> medical therapy only  - Respiratory sputum culture repeated on 7/30 without pseudomonnas  - Repeat CT chest on 8/11    Multifocal pneumonia, primary in the right lung, with areas of improvement   since 07/23/2022. Right upper lobe pulmonary abscess is persistent, mildly   decreased with small amount of fluid in the interval.         3. Epistaxis- resolved. Right nare Rhinorocket Dcd.  -DC lovenox.  -Keep ASA and prasugrel going  -HnH qd  -Transfuse PRBCs if hb is below 7.0  -Transfer out of ICU in next 24hrs        4. Critical coronary artery disease single-vessel  Status post PCI to left circumflex by Dr Regla Ding on this admission  with stent  Now on antiplatelet therapy, aspirin and Plavix     5. Anemia, AOCD. Now has hemoptysis. FU iron studies TIBC ferritin % and reticulocyte count  No indication to transfusion yet       6. COPD, severe ([address] - without acute exacerbation. Will provide Nebulizer treatments as needed and continue home medications. Patient will be monitored closely, and deep breathing and coughing will be encouraged while awake. 7. Hyperlipidemia - No current evidence of Rhabdomyolysis or other adverse effects. Continue statin therapy while in the hospital     8. Chronic pain syndrome -continue her home pain med regimen     9. Fibromyalgia - provide supportive care    DVT Prophylaxis: heparin  Diet: ADULT ORAL NUTRITION SUPPLEMENT; Lunch, Breakfast, Dinner; Standard High Calorie/High Protein Oral Supplement  ADULT DIET;  Regular  Code Status: Limited    PT/OT Eval Status: evaluating    Dispo; stable for discharge to skilled nursing or Tu Lipscomb MD

## 2022-08-15 LAB
ANION GAP SERPL CALCULATED.3IONS-SCNC: 11 MMOL/L (ref 3–16)
BUN BLDV-MCNC: 12 MG/DL (ref 7–20)
CALCIUM SERPL-MCNC: 8.4 MG/DL (ref 8.3–10.6)
CHLORIDE BLD-SCNC: 98 MMOL/L (ref 99–110)
CO2: 24 MMOL/L (ref 21–32)
CREAT SERPL-MCNC: <0.5 MG/DL (ref 0.6–1.2)
GFR AFRICAN AMERICAN: >60
GFR NON-AFRICAN AMERICAN: >60
GLUCOSE BLD-MCNC: 90 MG/DL (ref 70–99)
PHOSPHORUS: 2.6 MG/DL (ref 2.5–4.9)
POTASSIUM REFLEX MAGNESIUM: 3.7 MMOL/L (ref 3.5–5.1)
SODIUM BLD-SCNC: 133 MMOL/L (ref 136–145)

## 2022-08-15 PROCEDURE — 6360000002 HC RX W HCPCS: Performed by: INTERNAL MEDICINE

## 2022-08-15 PROCEDURE — 6370000000 HC RX 637 (ALT 250 FOR IP): Performed by: NURSE PRACTITIONER

## 2022-08-15 PROCEDURE — U0005 INFEC AGEN DETEC AMPLI PROBE: HCPCS

## 2022-08-15 PROCEDURE — 97530 THERAPEUTIC ACTIVITIES: CPT

## 2022-08-15 PROCEDURE — U0003 INFECTIOUS AGENT DETECTION BY NUCLEIC ACID (DNA OR RNA); SEVERE ACUTE RESPIRATORY SYNDROME CORONAVIRUS 2 (SARS-COV-2) (CORONAVIRUS DISEASE [COVID-19]), AMPLIFIED PROBE TECHNIQUE, MAKING USE OF HIGH THROUGHPUT TECHNOLOGIES AS DESCRIBED BY CMS-2020-01-R: HCPCS

## 2022-08-15 PROCEDURE — 84100 ASSAY OF PHOSPHORUS: CPT

## 2022-08-15 PROCEDURE — 2700000000 HC OXYGEN THERAPY PER DAY

## 2022-08-15 PROCEDURE — 6370000000 HC RX 637 (ALT 250 FOR IP): Performed by: INTERNAL MEDICINE

## 2022-08-15 PROCEDURE — 80048 BASIC METABOLIC PNL TOTAL CA: CPT

## 2022-08-15 PROCEDURE — 97535 SELF CARE MNGMENT TRAINING: CPT

## 2022-08-15 PROCEDURE — 2060000000 HC ICU INTERMEDIATE R&B

## 2022-08-15 PROCEDURE — 94669 MECHANICAL CHEST WALL OSCILL: CPT

## 2022-08-15 PROCEDURE — 2580000003 HC RX 258: Performed by: STUDENT IN AN ORGANIZED HEALTH CARE EDUCATION/TRAINING PROGRAM

## 2022-08-15 PROCEDURE — 2580000003 HC RX 258: Performed by: INTERNAL MEDICINE

## 2022-08-15 PROCEDURE — 99232 SBSQ HOSP IP/OBS MODERATE 35: CPT | Performed by: INTERNAL MEDICINE

## 2022-08-15 PROCEDURE — 51702 INSERT TEMP BLADDER CATH: CPT

## 2022-08-15 PROCEDURE — 94640 AIRWAY INHALATION TREATMENT: CPT

## 2022-08-15 PROCEDURE — 6370000000 HC RX 637 (ALT 250 FOR IP): Performed by: STUDENT IN AN ORGANIZED HEALTH CARE EDUCATION/TRAINING PROGRAM

## 2022-08-15 PROCEDURE — 94761 N-INVAS EAR/PLS OXIMETRY MLT: CPT

## 2022-08-15 RX ORDER — PREDNISONE 20 MG/1
40 TABLET ORAL DAILY
Qty: 20 TABLET | Refills: 0 | DISCHARGE
Start: 2022-08-15 | End: 2022-08-20

## 2022-08-15 RX ORDER — TOBRAMYCIN INHALATION SOLUTION 300 MG/5ML
300 INHALANT RESPIRATORY (INHALATION) 2 TIMES DAILY
Qty: 85 ML | Refills: 0 | DISCHARGE
Start: 2022-08-15 | End: 2022-08-31 | Stop reason: SDUPTHER

## 2022-08-15 RX ADMIN — MEROPENEM 1000 MG: 1 INJECTION, POWDER, FOR SOLUTION INTRAVENOUS at 22:23

## 2022-08-15 RX ADMIN — Medication 1 CAPSULE: at 10:06

## 2022-08-15 RX ADMIN — PANTOPRAZOLE SODIUM 40 MG: 40 TABLET, DELAYED RELEASE ORAL at 05:54

## 2022-08-15 RX ADMIN — DULOXETINE HYDROCHLORIDE 60 MG: 60 CAPSULE, DELAYED RELEASE ORAL at 22:23

## 2022-08-15 RX ADMIN — SODIUM CHLORIDE: 9 INJECTION, SOLUTION INTRAVENOUS at 22:22

## 2022-08-15 RX ADMIN — NYSTATIN 500000 UNITS: 100000 SUSPENSION ORAL at 10:07

## 2022-08-15 RX ADMIN — MOMETASONE FUROATE AND FORMOTEROL FUMARATE DIHYDRATE 2 PUFF: 200; 5 AEROSOL RESPIRATORY (INHALATION) at 08:02

## 2022-08-15 RX ADMIN — TIZANIDINE 4 MG: 4 TABLET ORAL at 22:23

## 2022-08-15 RX ADMIN — SODIUM CHLORIDE SOLN NEBU 3% 15 ML: 3 NEBU SOLN at 20:50

## 2022-08-15 RX ADMIN — SODIUM CHLORIDE, PRESERVATIVE FREE 10 ML: 5 INJECTION INTRAVENOUS at 10:09

## 2022-08-15 RX ADMIN — SODIUM CHLORIDE SOLN NEBU 3% 15 ML: 3 NEBU SOLN at 11:54

## 2022-08-15 RX ADMIN — IPRATROPIUM BROMIDE AND ALBUTEROL SULFATE 1 AMPULE: 2.5; .5 SOLUTION RESPIRATORY (INHALATION) at 20:50

## 2022-08-15 RX ADMIN — IPRATROPIUM BROMIDE AND ALBUTEROL SULFATE 1 AMPULE: 2.5; .5 SOLUTION RESPIRATORY (INHALATION) at 16:38

## 2022-08-15 RX ADMIN — MOMETASONE FUROATE AND FORMOTEROL FUMARATE DIHYDRATE 2 PUFF: 200; 5 AEROSOL RESPIRATORY (INHALATION) at 20:50

## 2022-08-15 RX ADMIN — ATORVASTATIN CALCIUM 40 MG: 40 TABLET, FILM COATED ORAL at 22:23

## 2022-08-15 RX ADMIN — TRAMADOL HYDROCHLORIDE 25 MG: 50 TABLET, COATED ORAL at 13:02

## 2022-08-15 RX ADMIN — METOPROLOL SUCCINATE 12.5 MG: 25 TABLET, EXTENDED RELEASE ORAL at 10:06

## 2022-08-15 RX ADMIN — IPRATROPIUM BROMIDE AND ALBUTEROL SULFATE 1 AMPULE: 2.5; .5 SOLUTION RESPIRATORY (INHALATION) at 11:54

## 2022-08-15 RX ADMIN — GABAPENTIN 600 MG: 300 CAPSULE ORAL at 22:23

## 2022-08-15 RX ADMIN — GABAPENTIN 600 MG: 300 CAPSULE ORAL at 10:06

## 2022-08-15 RX ADMIN — NYSTATIN 500000 UNITS: 100000 SUSPENSION ORAL at 22:23

## 2022-08-15 RX ADMIN — DULOXETINE HYDROCHLORIDE 60 MG: 60 CAPSULE, DELAYED RELEASE ORAL at 10:06

## 2022-08-15 RX ADMIN — TOBRAMYCIN 300 MG: 300 SOLUTION RESPIRATORY (INHALATION) at 20:50

## 2022-08-15 RX ADMIN — SODIUM CHLORIDE SOLN NEBU 3% 15 ML: 3 NEBU SOLN at 08:03

## 2022-08-15 RX ADMIN — NYSTATIN 500000 UNITS: 100000 SUSPENSION ORAL at 17:53

## 2022-08-15 RX ADMIN — ASPIRIN 81 MG: 81 TABLET, CHEWABLE ORAL at 10:06

## 2022-08-15 RX ADMIN — METHYLPREDNISOLONE SODIUM SUCCINATE 40 MG: 40 INJECTION, POWDER, FOR SOLUTION INTRAMUSCULAR; INTRAVENOUS at 10:07

## 2022-08-15 RX ADMIN — MEROPENEM 1000 MG: 1 INJECTION, POWDER, FOR SOLUTION INTRAVENOUS at 15:24

## 2022-08-15 RX ADMIN — IPRATROPIUM BROMIDE AND ALBUTEROL SULFATE 1 AMPULE: 2.5; .5 SOLUTION RESPIRATORY (INHALATION) at 08:03

## 2022-08-15 RX ADMIN — TOBRAMYCIN 300 MG: 300 SOLUTION RESPIRATORY (INHALATION) at 08:34

## 2022-08-15 RX ADMIN — CLOPIDOGREL BISULFATE 75 MG: 75 TABLET ORAL at 10:06

## 2022-08-15 RX ADMIN — MEROPENEM 1000 MG: 1 INJECTION, POWDER, FOR SOLUTION INTRAVENOUS at 06:05

## 2022-08-15 RX ADMIN — NYSTATIN 500000 UNITS: 100000 SUSPENSION ORAL at 13:02

## 2022-08-15 RX ADMIN — SODIUM CHLORIDE SOLN NEBU 3% 15 ML: 3 NEBU SOLN at 16:41

## 2022-08-15 ASSESSMENT — PAIN DESCRIPTION - LOCATION: LOCATION: BACK

## 2022-08-15 ASSESSMENT — PAIN SCALES - GENERAL: PAINLEVEL_OUTOF10: 10

## 2022-08-15 ASSESSMENT — PAIN DESCRIPTION - DESCRIPTORS: DESCRIPTORS: STABBING

## 2022-08-15 ASSESSMENT — PAIN DESCRIPTION - ORIENTATION: ORIENTATION: LOWER

## 2022-08-15 NOTE — PROGRESS NOTES
Pulmonary Critical Care Progress Note     Patient's name:  Tracie Jones  Medical Record Number: [id number]  Patient's account/billing number: [de-identified]  Patient's YOB: 1945  Age: 78 y.o. Date of Admission: 7/18/2022  5:03 PM  Date of Consult: 8/15/2022      Primary Care Physician: HECTOR White CNP      Code Status: Limited    Chief complaint: acute respiratory failure with hypoxia    Assessment and Plan     Acute respiratory failure with hypoxia  Necrotizing pseudomonas PNA  Massive Epistaxis s/p Rhinorocket removed  Moderately Severe copd     Plan:  Nebulized duoneb QID   Acapella  O2 to keep sat > 90%  Steroid   Antibiotics per ID, meropenem and Arnie        Overnight:  Afebrile   On 4 L    REVIEW OF SYSTEMS:  Review of Systems -   General ROS: negative  Psychological ROS: negative  Ophthalmic ROS: negative  ENT ROS: mild right epistaxis   Allergy and Immunology ROS: negative  Hematological and Lymphatic ROS: negative  Endocrine ROS: negative  Breast ROS: negative  Respiratory ROS: cough and sob  Cardiovascular ROS: no chest pain or dyspnea on exertion  Gastrointestinal ROS:negative  Genito-Urinary ROS: negative  Musculoskeletal ROS: negative  Neurological ROS: negative  Dermatological ROS: negative        Physical Exam:    Vitals: BP (!) 113/97   Pulse 88   Temp 98.2 °F (36.8 °C) (Oral)   Resp 28   Ht 5' 4\" (1.626 m)   Wt 115 lb 4.8 oz (52.3 kg)   SpO2 95%   BMI 19.79 kg/m²     Last Body weight:   Wt Readings from Last 3 Encounters:   08/15/22 115 lb 4.8 oz (52.3 kg)   07/13/22 133 lb 13.1 oz (60.7 kg)   06/20/22 138 lb (62.6 kg)       Body Mass Index : Body mass index is 19.79 kg/m². Intake and Output summary:   Intake/Output Summary (Last 24 hours) at 8/15/2022 1040  Last data filed at 8/15/2022 0457  Gross per 24 hour   Intake 250 ml   Output 275 ml   Net -25 ml         Physical Examination:     Gen: no acute distress   Eyes: PERRL.  Anicteric sclera. No conjunctival injection. ENT: No discharge. Posterior oropharynx clear. External appearance of ears and nose normal.  Neck: Trachea midline. No mass   Resp:  Moderate rhonchi and diminished on the right, no wheezing, mild increase wob  CV: Regular rate. Regular rhythm. No murmur or rub. No edema. GI: Soft, Non-tender. Non-distended. +BS  Skin: Warm, dry, w/o erythema. Lymph: No cervical or supraclavicular LAD. M/S: No cyanosis. No clubbing. Neuro:  CN 2-12 tested, no focal neurologic deficit. Moves all extremities  Psych: Awake and alert, Oriented x 3. Judgement and insight appropriate. Mood stable. Laboratory findings:-    CBC:   Recent Labs     08/13/22 0418   WBC 10.8   HGB 8.6*   *       BMP:    Recent Labs     08/13/22  0417 08/14/22  0450   * 133*   K 4.0 3.4*   CL 96* 96*   CO2 33* 30   BUN 12 14   CREATININE <0.5* <0.5*   GLUCOSE 94 92       S. Calcium:  Recent Labs     08/14/22  0450   CALCIUM 8.4           Radiology Review:  Pertinent images / reports were reviewed as a part of this visit.   CXR reviewed extensive right airspace disease                     Joanne Li MD, M.BEAU.            8/15/2022, 10:40 AM

## 2022-08-15 NOTE — CARE COORDINATION
CASE MANAGEMENT DISCHARGE SUMMARY:    DISCHARGE DATE: 8/16/2022    Discharging to Facility/ Agency   Name: [facility]  Address:  [address], [place]   Phone:  [phone]  Fax:  [phone]    TRANSPORTATION: via [address] [place] [address] Avenue: 3:00pm    INSURANCE PRECERT OBTAINED: via [address] [facility] COMPLETED: completed 8/15/2022    COMMENTS: Patient to discharge to [facility] today once precert approved. Will continue to monitor for updates. Patient, family, bedside RN, and facility aware of discharge plan and timeline.     Electronically signed by Shannon Manzo RN on 8/15/2022 at 3:09 PM

## 2022-08-15 NOTE — PROGRESS NOTES
Occupational Therapy  Facility/Department: [address] PROGRESSIVE CARE  Occupational Therapy Daily Note  Name: Alfonso Sanchez  : 1945  MRN: [id number]  Date of Service: 8/15/2022    Discharge Recommendations:  Patient would benefit from continued therapy after discharge, 3-5 sessions per week   Alfonso Sanchez scored a 16/24 on the AM-PAC ADL Inpatient form. Current research shows that an AM-PAC score of 17 or less is typically not associated with a discharge to the patient's home setting. Based on the patient's AM-PAC score and their current ADL deficits, it is recommended that the patient have 3-5 sessions per week of Occupational Therapy at d/c to increase the patient's independence. Please see assessment section for further patient specific details. If patient discharges prior to next session this note will serve as a discharge summary. Please see below for the latest assessment towards goals. Patient Diagnosis(es): The primary encounter diagnosis was NSTEMI (non-ST elevated myocardial infarction) ([facility] [address] Diagnoses of Pneumonia of right lung due to infectious organism, unspecified part of lung, Hypoxia, and Abscess of upper lobe of right lung with pneumonia [facility]) were also pertinent to this visit. Past Medical History:  has a past medical history of CAD (coronary artery disease), Chronic pain syndrome, Colorectal polyps, COPD (chronic obstructive pulmonary disease) (HCC), CTS (carpal tunnel syndrome), DDD (degenerative disc disease), lumbar, Depression, Family hx of colon cancer, Fibromyalgia, Hyperlipemia, IBS (irritable bowel syndrome), Lumbar spondylosis, New onset type 2 diabetes mellitus ([facility] [address] On home O2, Pneumonia, and Urticaria, chronic. Past Surgical History:  has a past surgical history that includes Tympanostomy tube placement; Cervical polyp removal (2004); Carpal tunnel release (Bilateral); Tubal ligation; Intracapsular cataract extraction (Left, 2020);  Intracapsular cataract extraction (Right, 07/14/2020); Coronary angioplasty with stent (07/19/2022); Cataract extraction; and bronchoscopy (N/A, 8/3/2022). Assessment   Performance deficits / Impairments: Decreased functional mobility ; Decreased ADL status; Decreased ROM; Decreased strength;Decreased endurance;Decreased balance;Decreased high-level IADLs  Assessment: Discussed with OTR: Pt on 4L initially this date, to needing 02 increased to 6L  then 7L for activity. Pt desating to 85%, Pt required min A for transfers and min A x 1 to take a few steps bed > chair with RW. Pt requiring up to Max A for ADLs. Pt decreased endurance and requires extended rest breaks between bouts of activity. Pt demonstrates need for ongoing skilled OT at d/c to maximize pt's safety and independence prior to return home. History: Pt is a 78 y.o. female admitted from SNF with Acute on chronic respiratory failure with hypoxia and hypercapnia, NSTEMI, and necrotizing PNA. Pt recent admit with COPD Exac, ROLY, UTI and Sepsis and d/c'd on 7/13/2022 to Home at [facility] for rehab. At baseline, pt lives with significant other in apartment setting and independent ADLs, IADLs, and fxl mobility. REQUIRES OT FOLLOW-UP: Yes  Activity Tolerance  Activity Tolerance: Patient limited by fatigue;Treatment limited secondary to medical complications (free text)  Activity Tolerance Comments: decreased endurance; 02 at 4L at rest, to 6L, then 7L with activity d/t desating to 85%. Pt 02 set back to 4L (RN aware) at end of tx; sats reading WNL.         Plan   Plan  Times per Week: 3-5  Current Treatment Recommendations: Strengthening, ROM, Balance training, Functional mobility training, Endurance training, Safety education & training, Self-Care / ADL     Restrictions  Restrictions/Precautions  Restrictions/Precautions: Fall Risk  Position Activity Restriction  Other position/activity restrictions: 02 at 4L at rest, to 6L, then 7L with activity d/t desating to 85%. Saavedra; IV    Subjective   General  Chart Reviewed: Yes, Orders, Progress Notes, Labs  Patient assessed for rehabilitation services?: Yes  Additional Pertinent Hx: Per Dr. Karishma Jordan H&P: \"Pt is an 78y.o. year-old female with a history of hyperlipidemia, fibromyalgia, chronic pain syndrome, severe COPD with chronic hypoxic respiratory failure requiring oxygen at 4 L/min via nasal cannula continuously. She presents to the emergency room for evaluation following a 2 to 3-day history of worsening shortness of breath. She was recently treated for Pseudomonas pneumonia and discharged to rehab. EMS reports that she was on a very long oxygen tube when they arrived. She was placed on 6 L nasal cannula and her oxygen saturation improved. In the emergency room she was found to have an abnormal EKG with inverted T waves in V2 through V5. Cardiology was consulted and asked that she be put on a heparin drip. Patient denies any current or recent chest pain. She is being admitted for further evaluation and treatment. \" Pt s/p C on 7/19. 7/27 Pt with rapid response called and transferred to ICU d/t respiratory distress and placed on Bipap. Pt transferred out of ICU 7/28. 8/3 transferred to ICU again d/t hemoptysis. 8/3-8/4/2022 Pt Intubated  Family / Caregiver Present: No  Referring Practitioner: Dr. Anuel Rahman  Diagnosis: Acute on chronic respiratory failure with hypoxia and hypercapnia, NSTEMI, Necrotizing pneumonia  Subjective  Subjective: Pt met BS, in bed, just finished breakfast and agreeable to OT. Pt on 4L 02, WNL 02 sats at rest. Pt denied pain.   General Comment  Comments: Per RN ok for therapy     Social/Functional History  Social/Functional History  Lives With: Significant other (Ray)  Type of Home: Apartment (1st floor.)  Home Layout: One level (laundry in apt)  Home Access: Level entry  Bathroom Shower/Tub: Tub/Shower unit  Bathroom Toilet: Standard  Bathroom Equipment: Shower chair  Bathroom Accessibility: Accessible  Home Equipment: RoleStar  ADL Assistance: Independent  Homemaking Assistance: Independent (Shared with sign other.)  Ambulation Assistance: Independent (Without assist device.)  Transfer Assistance: Independent  Active : No (Sign other drives.)  Patient's  Info: significant other drives  Occupation: Retired  Additional Comments: Pt recent admit 7/3/2022 with COPD Exac, ROLY, UTI and Sepsis and d/c'd on 7/13/2022 to Home at [facility] SNF setting. Objective         Safety Devices  Type of Devices: Call light within reach;Nurse notified; Left in chair;Gait belt; Chair alarm in place; Heels elevated for pressure relief           ADL  Grooming: Setup;Minimal assistance  Grooming Skilled Clinical Factors: seated from EOB, fair quslity to wash face. Dentures started to clean from seated in recliner, finished by ARIANNA at sink for thorough rinsing. UE Bathing: Setup  UE Bathing Skilled Clinical Factors: fair quality while seated at EOB  LE Bathing: Maximum assistance  LE Bathing Skilled Clinical Factors: tay/post areas, with PCA assisting for buttocks, tay area using CHG wipes. d/t terrell care. UE Dressing: Moderate assistance  UE Dressing Skilled Clinical Factors: gown changed  LE Dressing: Maximum assistance  LE Dressing Skilled Clinical Factors: to don briefs. Toileting Skilled Clinical Factors: Dep with terrell use. Declined need for BM. Bed mobility  Supine to Sit: Stand by assistance;Contact guard assistance (HOB slightly raised and use of rail.)  Sit to Supine: Unable to assess  Transfers  Sit to stand: Minimal assistance  Stand to sit: Minimal assistance  Transfer Comments: sit>stand off bed and to recliner, with RW, cues, assist for safe hand placement. Pt stand-stepped, bed to recliner, with RW, Min A x1. PCA present to assist with lines. Pt desating to mid 80's on 6L, 7L.      Cognition  Overall Cognitive Status: WFL  Orientation  Overall Orientation Status: Within Functional Limits               Static Sitting Balance at EOB for ADL's with SBA. Pt desating to low 80's on 4L, increased to 6L then 7L when desating to mid 80's (ok per RN). Static Standing Balance: CGA for ADL's at walker    Education Given To: Patient  Education Provided: Role of Therapy;Plan of Care;Transfer Training;ADL Adaptive Strategies; Energy Conservation  Education Method: Verbal;Demonstration  Barriers to Learning: None  Education Outcome: Verbalized understanding;Demonstrated understanding        AM-PAC Score        AM-PAC Inpatient Daily Activity Raw Score: 16 (08/15/22 1001)  AM-PAC Inpatient ADL T-Scale Score : 35.96 (08/15/22 1001)  ADL Inpatient CMS 0-100% Score: 53.32 (08/15/22 1001)  ADL Inpatient CMS G-Code Modifier : CK (08/15/22 1001)       Goals  Short Term Goals  Time Frame for Short term goals: Prior to d/c: goals ongoing  Short Term Goal 1: Pt will bathe with SBA. Short Term Goal 2: Pt will dress with SBA. Short Term Goal 3: Pt will toilet with SBA/supervision. Short Term Goal 4: Pt will complete fxl mobility and fxl transfers to/from ADL surfaces with SBA/supervision using AD. Short Term Goal 5: Pt will tolerate standing >5 minutes for functional task with SBA/supervision while maintaining O2 sats >90% to improve standing tolerance for ADL routine. Long Term Goals  Time Frame for Long term goals : STGs=LTGs  Patient Goals   Patient goals : to finish rehab at eventually return home.        Therapy Time   Individual Concurrent Group Co-treatment   Time In 0915         Time Out 1010         Minutes [address]/L,515

## 2022-08-15 NOTE — PLAN OF CARE
Problem: Discharge Planning  Goal: Discharge to home or other facility with appropriate resources  Outcome: Progressing     Problem: Pain  Goal: Verbalizes/displays adequate comfort level or baseline comfort level  Outcome: Progressing     Problem: Confusion  Goal: Confusion, delirium, dementia, or psychosis is improved or at baseline  Description: INTERVENTIONS:  1. Assess for possible contributors to thought disturbance, including medications, impaired vision or hearing, underlying metabolic abnormalities, dehydration, psychiatric diagnoses, and notify attending LIP  2. Bee high risk fall precautions, as indicated  3. Provide frequent short contacts to provide reality reorientation, refocusing and direction  4. Decrease environmental stimuli, including noise as appropriate  5. Monitor and intervene to maintain adequate nutrition, hydration, elimination, sleep and activity  6. If unable to ensure safety without constant attention obtain sitter and review sitter guidelines with assigned personnel  7. Initiate Psychosocial CNS and Spiritual Care consult, as indicated  Outcome: Progressing     Problem: Skin/Tissue Integrity  Goal: Absence of new skin breakdown  Description: 1. Monitor for areas of redness and/or skin breakdown  2. Assess vascular access sites hourly  3. Every 4-6 hours minimum:  Change oxygen saturation probe site  4. Every 4-6 hours:  If on nasal continuous positive airway pressure, respiratory therapy assess nares and determine need for appliance change or resting period.   Outcome: Progressing     Problem: Safety - Adult  Goal: Free from fall injury  Outcome: Progressing     Problem: ABCDS Injury Assessment  Goal: Absence of physical injury  Outcome: Progressing     Problem: Chronic Conditions and Co-morbidities  Goal: Patient's chronic conditions and co-morbidity symptoms are monitored and maintained or improved  Outcome: Progressing     Problem: Nutrition Deficit:  Goal: Optimize nutritional status  Outcome: Progressing     Problem: Safety - Medical Restraint  Goal: Remains free of injury from restraints (Restraint for Interference with Medical Device)  Description: INTERVENTIONS:  1. Determine that other, less restrictive measures have been tried or would not be effective before applying the restraint  2. Evaluate the patient's condition at the time of restraint application  3. Inform patient/family regarding the reason for restraint  4.  Q2H: Monitor safety, psychosocial status, comfort, nutrition and hydration  Outcome: Progressing

## 2022-08-15 NOTE — PROGRESS NOTES
Physical Therapy Attempt    Attempted to see patient for therapy. Pt up in chair eating lunch. Requesting therapist return later. Will follow up as schedule permits.     Livier Link,   [facility]

## 2022-08-15 NOTE — DISCHARGE SUMMARY
Hospital Medicine Discharge Summary    Patient ID: Janelle Hamilton      Patient's PCP: HECTOR Watkins - CNP    Admit Date: 7/18/2022     Discharge Date:   08/16/22      Admitting Provider: Kylah Mejia MD     Discharge Provider: Lm Ravi MD     Discharge Diagnoses: Active Hospital Problems    Diagnosis     Hypoxia [R09.02]      Priority: Medium    Acute respiratory insufficiency [R06.89]      Priority: Medium    Acute blood loss anemia [D62]      Priority: Medium    Hypotension due to drugs [I95.2]      Priority: Medium    Massive hemoptysis [R04.2]      Priority: Medium    Epistaxis [R04.0]      Priority: Medium    Peripherally inserted central catheter (PICC) in place [Z45.2]      Priority: Medium    NSTEMI (non-ST elevated myocardial infarction) ([facility] [address] [I21.4]      Priority: Medium    Necrotizing pneumonia ([facility] [address] [J85.0]      Priority: Medium    Abscess of upper lobe of right lung with pneumonia ([facility] [address] [J85.1]      Priority: Medium    Pseudomonas respiratory infection [J98.8, B96.5]      Priority: Medium    Abnormal CT of the chest [R93.89]      Priority: Medium    Acute on chronic respiratory failure with hypoxia and hypercapnia (HCC) [P59.10, J96.22]      Priority: Medium    Abnormal EKG [R94.31]      Priority: Medium    Chronic obstructive pulmonary disease ([address] [J44.9]      Priority: Medium    Cavitary pneumonia [J18.9, J98.4]      Priority: Medium    Pneumonia of right lung due to infectious organism [J18.9]      Priority: Medium    COPD, severe ([address] [J44.9]     Chronic pain syndrome [G89.4]     Hypercholesteremia [E78.00]     Fibromyalgia [M79.7]     Smoking [F17.200]        The patient was seen and examined on day of discharge and this discharge summary is in conjunction with any daily progress note from day of discharge.     Hospital Course:     Acute on chronic hypoxic respiratory failure  -Now on nasal cannula 3 L  -Try to wean O2 down to regular nasal cannula tolerated be discharged home  -Solu-Medrol started, for 5 days > prednisone     2. 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 in situ   -CT surgery consulted:  would need pneumonectomy for which she is a poor candidate --> medical therapy only  - Respiratory sputum culture repeated on 7/30 without pseudomonnas  - Repeat CT chest on 8/11     Multifocal pneumonia, primary in the right lung, with areas of improvement   since 07/23/2022. Right upper lobe pulmonary abscess is persistent, mildly   decreased with small amount of fluid in the interval.           3. Epistaxis- resolved. Right nare Rhinorocket Dcd.  -DC lovenox.  -Keep ASA and prasugrel going  -HnH qd  -Transfuse PRBCs if hb is below 7.0  -Transfer out of ICU in next 24hrs        4. Critical coronary artery disease single-vessel  Status post PCI to left circumflex by Dr Aliza Montgomery on this admission  with stent  Now on antiplatelet therapy, aspirin and Plavix     5. Anemia, AOCD. Now has hemoptysis. FU iron studies TIBC ferritin % and reticulocyte count  No indication to transfusion yet        6. COPD, severe ([address] - without acute exacerbation. Will provide Nebulizer treatments as needed and continue home medications. Patient will be monitored closely, and deep breathing and coughing will be encouraged while awake. 7. Hyperlipidemia - No current evidence of Rhabdomyolysis or other adverse effects. Continue statin therapy while in the hospital     8. Chronic pain syndrome -continue her home pain med regimen     9. Fibromyalgia - provide supportive care               Physical Exam Performed:     /65   Pulse 99   Temp 97.5 °F (36.4 °C) (Axillary)   Resp 27   Ht 5' 4\" (1.626 m)   Wt 115 lb 4.8 oz (52.3 kg)   SpO2 96%   BMI 19.79 kg/m²       General appearance:  No apparent distress, appears stated age and cooperative.   HEENT: Normal cephalic, atraumatic without obvious deformity. Pupils equal, round, and reactive to light. Extra ocular muscles intact. Conjunctivae/corneas clear. Neck: Supple, with full range of motion. No jugular venous distention. Trachea midline. Respiratory:  Normal respiratory effort. Clear to auscultation, bilaterally without Rales/Wheezes/Rhonchi. Cardiovascular:  Regular rate and rhythm with normal S1/S2 without murmurs, rubs or gallops. Abdomen: Soft, non-tender, non-distended with normal bowel sounds. Musculoskeletal:  No clubbing, cyanosis or edema bilaterally. Full range of motion without deformity. Skin: Skin color, texture, turgor normal.  No rashes or lesions. Neurologic:  Neurovascularly intact without any focal sensory/motor deficits. Cranial nerves: II-XII intact, grossly non-focal.  Psychiatric:  Alert and oriented, thought content appropriate, normal insight  Capillary Refill: Brisk,< 3 seconds   Peripheral Pulses: +2 palpable, equal bilaterally       Labs: For convenience and continuity at follow-up the following most recent labs are provided:      CBC:    Lab Results   Component Value Date/Time    WBC 10.8 08/13/2022 04:18 AM    HGB 8.6 08/13/2022 04:18 AM    HCT 25.7 08/13/2022 04:18 AM     08/13/2022 04:18 AM       Renal:    Lab Results   Component Value Date/Time     08/15/2022 06:07 AM    K 3.4 08/14/2022 04:50 AM    CL 98 08/15/2022 06:07 AM    CO2 24 08/15/2022 06:07 AM    BUN 12 08/15/2022 06:07 AM    CREATININE <0.5 08/15/2022 06:07 AM    CALCIUM 8.4 08/15/2022 06:07 AM    PHOS 2.6 08/15/2022 06:07 AM         Significant Diagnostic Studies    Radiology:   CT CHEST WO CONTRAST   Final Result   1. Multifocal pneumonia, primary in the right lung, with areas of improvement   since 07/23/2022.   Right upper lobe pulmonary abscess is persistent, mildly   decreased with small amount of fluid in the interval.   2. Few foci of airspace disease in the left lower lobe are mildly increased. 3. Persistent small right pleural effusion. 4. Stable mediastinal adenopathy, likely reactive in the setting. Fluoroscopy modified barium swallow with video   Final Result   No evidence of aspiration. Please see separate speech pathology report for full discussion of findings   and recommendations. RECOMMENDATIONS:   Unavailable         XR CHEST PORTABLE   Final Result   Left-sided PICC line tip is in the superior vena cava, the tip is turned   upward superiorly, a change from priors. Moderate right pleural effusion and right lung consolidations similar to   prior study. XR CHEST PORTABLE   Final Result   Increasing opacity throughout portions of the right lung, which can reflect   combination of airspace disease, atelectasis, and pleural effusion. XR CHEST PORTABLE   Final Result   Endotracheal tube with the tip approximately 1 cm above the song. Consider   retraction by approximately 3.5 cm. Enteric tube with the tip and the side   hole below the diaphragm overlying the left upper abdomen, likely within the   fundus of the stomach. Redemonstration of multifocal pneumonia affecting the right lung. Small   right pleural effusion. XR CHEST PORTABLE   Final Result   Unchanged multifocal right-sided pneumonia. XR CHEST PORTABLE   Final Result   Stable multifocal airspace disease in the right lung with probable upper lobe   abscess. XR CHEST PORTABLE   Final Result   Stable multifocal airspace disease in the right lung, including probable   pulmonary abscess in the right upper lobe. CT CHEST WO CONTRAST   Final Result   1. Mixed consolidative and ground-glass opacities as well as interstitial   thickening throughout the majority of the right lung compatible with   pneumonia. 2. There is a cavity in the right upper lobe demonstrating an air-fluid level   measuring up to 9.7 cm concerning for abscess formation.    3. Trace right pleural effusion. 4. Prominent and enlarged mediastinal lymph nodes, likely reactive. 5. Stable 6 mm left lower lobe pulmonary nodule. RECOMMENDATIONS:   Fleischner Society guidelines for follow-up and management of incidentally   detected pulmonary nodules:      Single Solid Nodule:      Nodule size less than 6 mm   In a low-risk patient, no routine follow-up. In a high-risk patient, optional CT at 12 months. Nodule size equals 6-8 mm   In a low-risk patient, CT at 6-12 months, then consider CT at 18-24 months. In a high-risk patient, CT at 6-12 months, then CT at 18-24 months. Nodule size greater than 8 mm         In a low-risk patient, consider CT at 3 months, PET/CT, or tissue sampling. In a high-risk patient, consider CT at 3 months, PET/CT, or tissue sampling. Multiple Solid Nodules:      Nodule size less than 6 mm   In a low-risk patient, no routine follow-up. In a high-risk patient, optional CT at 12 months. Nodule size equals 6-8 mm   In a low-risk patient, CT at 3-6 months, then consider CT at 18-24 months. In a high-risk patient, CT at 3-6 months, then CT at 18-24 months. Nodule size greater than 8 mm   In a low-risk patient, CT at 3-6 months, then consider CT at 18-24 months. In a high-risk patient, CT at 3-6 months, then CT at 18-24 months. - Low risk patients include individuals with minimal or absent history of   smoking and other known risk factors. - High risk patients include individuals with a history or smoking or known   risk factors. Radiology 2017 http://pubs. rsna.org/doi/full/10.1148/radiol. 0556708805         XR CHEST PORTABLE   Final Result   1. Increased pneumonia throughout the right lung remaining most prominent in   the right upper lobe. 2. Emphysema better demonstrated on CT. 3. Possible trace right pleural effusion.          XR CHEST PORTABLE   Final Result   Improved aeration of the right chest with persistent consolidation in the mid   to upper right chest.         CT CHEST WO CONTRAST    (Results Pending)          Consults:     IP CONSULT TO CARDIOLOGY  IP CONSULT TO HOSPITALIST  REQUEST FOR SPIRITUAL ASSESSMENT  IP CONSULT TO SPIRITUAL SERVICES  IP CONSULT TO CARDIAC REHAB  IP CONSULT TO CARDIAC REHAB  IP CONSULT TO PULMONOLOGY  IP CONSULT TO INFECTIOUS DISEASES  IP CONSULT TO CARDIOTHORACIC SURGERY  IP CONSULT TO PALLIATIVE CARE  IP CONSULT TO OTOLARYNGOLOGY  IP CONSULT TO HOSPICE  IP CONSULT TO CASE MANAGEMENT    Disposition:  SNF     Condition at Discharge: Stable    Discharge Instructions/Follow-up:  Follow-up with PCP within 7 days from discharge, not doing so could have life-threatening consequences. Take medication as instructed;  return to the emergency department if persistent symptoms, experiencing side effects from medication including nausea vomiting or unable to keep medications down. Code Status:  Limited     Activity: activity as tolerated    Diet: cardiac diet      Discharge Medications:     Current Discharge Medication List             Details   tobramycin, PF, (LUCILLE) 300 MG/5ML nebulizer solution Take 5 mLs by nebulization in the morning and 5 mLs in the evening. Do all this for 17 doses. Qty: 85 mL, Refills: 0      aspirin 81 MG EC tablet Take 1 tablet by mouth in the morning. Qty: 30 tablet, Refills: 3      prasugrel (EFFIENT) 10 MG TABS Take 1 tablet by mouth in the morning. Qty: 30 tablet, Refills: 1                Details   predniSONE (DELTASONE) 20 MG tablet Take 2 tablets by mouth in the morning for 5 days. Qty: 20 tablet, Refills: 0                Details   gabapentin (NEURONTIN) 600 MG tablet Take 1 tablet by mouth 2 times daily for 30 days. Qty: 60 tablet, Refills: 0    Associated Diagnoses: Chronic pain syndrome; Degeneration of lumbar or lumbosacral intervertebral disc; Fibromyalgia; Lumbar spondylosis;  Trochanteric bursitis of right hip; DDD (degenerative disc disease), lumbar; Osteoarthritis of spine with radiculopathy, lumbar region      nystatin (MYCOSTATIN) 486530 UNIT/ML suspension Take 5 mLs by mouth 4 times daily  Qty: 60 mL, Refills: 0      benzocaine-menthol (CEPACOL SORE THROAT) 15-3.6 MG lozenge Take 1 lozenge by mouth every 2 hours as needed for Sore Throat  Qty: 30 lozenge, Refills: 0      DULoxetine (CYMBALTA) 60 MG extended release capsule TAKE 1 CAPSULE BY MOUTH TWICE A DAY  Qty: 60 capsule, Refills: 2    Associated Diagnoses: Chronic pain syndrome;  Moderate episode of recurrent major depressive disorder (HCC)      pantoprazole (PROTONIX) 40 MG tablet Take 1 tablet by mouth daily  Qty: 30 tablet, Refills: 1    Associated Diagnoses: Chronic pain syndrome      tiZANidine (ZANAFLEX) 4 MG tablet Take 1 tablet by mouth nightly  Qty: 30 tablet, Refills: 1    Associated Diagnoses: Chronic pain syndrome; Fibromyalgia; DDD (degenerative disc disease), lumbar      traZODone (DESYREL) 50 MG tablet Take 1-2 tablets by mouth nightly  Qty: 60 tablet, Refills: 1    Associated Diagnoses: Chronic pain syndrome      Fluticasone-Umeclidin-Vilant (TRELEGY ELLIPTA) 200-62.5-25 MCG/INH AEPB Inhale 1 Inhaler into the lungs daily  Qty: 1 each, Refills: 5    Associated Diagnoses: COPD, severe (HCC)      magnesium oxide (MGO) 400 (241.3 Mg) MG TABS tablet Take 1 tablet by mouth 2 times daily as needed (for cramping)  Qty: 60 tablet, Refills: 0    Associated Diagnoses: Chronic pain syndrome; Muscle cramping      alendronate (FOSAMAX) 70 MG tablet TAKE 1 TABLET BY MOUTH ONE TIME PER WEEK  Qty: 12 tablet, Refills: 1    Associated Diagnoses: Age-related osteoporosis without current pathological fracture      albuterol sulfate  (90 Base) MCG/ACT inhaler Inhale 2 puffs into the lungs every 6 hours as needed for Shortness of Breath  Qty: 1 each, Refills: 11    Associated Diagnoses: COPD, severe (HCC)      Calcium Citrate-Vitamin D 500-500 MG-UNIT CHEW Take 1 tablet by mouth 2 times daily Associated Diagnoses: Age-related osteoporosis without current pathological fracture      ONE TOUCH LANCETS MISC 1 each by Does not apply route daily  Qty: 100 each, Refills: 3    Associated Diagnoses: New onset type 2 diabetes mellitus ([facility] [address]      ipratropium-albuterol (DUONEB) 0.5-2.5 (3) MG/3ML SOLN nebulizer solution Take 1 aerosol every 4 hours for 2 days and then as needed for shortness of breath coughing and wheezing  Qty: 360 mL, Refills: 3      Cholecalciferol (VITAMIN D) 2000 units TABS tablet Take 1 tablet by mouth daily  Qty: 30 tablet      cetirizine (ZYRTEC) 10 MG tablet Take 10 mg by mouth daily as needed for Allergies      Nebulizers (COMPRESSOR/NEBULIZER) MISC 1 Units by Does not apply route 4 times daily  Qty: 1 each, Refills: 3             Time Spent on discharge is more than 30 minutes in the examination, evaluation, counseling and review of medications and discharge plan. Signed:    Katina Milian MD   8/15/2022      Thank you HECTOR Olivier - FRED for the opportunity to be involved in this patient's care. If you have any questions or concerns, please feel free to contact me at [phone].

## 2022-08-15 NOTE — CARE COORDINATION
08/15/22 1536   IMM Letter   IMM Letter given to Patient/Family/Significant other/Guardian/POA/by: Provided to patient at bedside by Amirah Jordan RN. Education provided to patient, patient reported no questions and verbalized understanding. Patient aware of 4 hours allotted time to determine if they choose to pursue Medicare appeal process.    IMM Letter date given: 08/15/22   IMM Letter time given: 1455   Electronically signed by Tod England RN on 8/15/2022 at 3:39 PM

## 2022-08-15 NOTE — CARE COORDINATION
[facility] PRE-CERT REQUEST SUBMITTED VIA NH ACCESS PORTAL    REQUESTED SETTING:  [facility]    ANTICIPATED ADMIT DATE TO SNF:  08/16/2022    [facility] AT [place] #:  [id number]  Kriss Negrete RN, BSN, Case Management  Phone: [phone]  Electronically signed by Kriss Negrete RN on 8/15/2022 at 3:55 PM

## 2022-08-15 NOTE — CARE COORDINATION
Discharge Planning:    Discharge order noted. Spoke with Perla Dave at [facility] and she requested patient discharge to the facility tomorrow morning as they are concerned about having adequate staffing to get her settled this evening. Physical therapy to see first thing in the morning in case this is needed for precert. Bedside RN made aware of discharge timeline.     Electronically signed by Fanta Mcclellan RN on 8/15/2022 at 3:42 PM

## 2022-08-15 NOTE — PROGRESS NOTES
Reviewed    Infusion Medications    sodium chloride 5 mL/hr at 08/14/22 0652    dextrose       Scheduled Medications    methylPREDNISolone  40 mg IntraVENous Daily    lactobacillus  1 capsule Oral Daily with breakfast    sodium chloride (Inhalant)  15 mL Nebulization Q4H WA    metoprolol succinate  12.5 mg Oral Daily    tobramycin (PF)  300 mg Nebulization BID    clopidogrel  75 mg Oral Daily    pantoprazole  40 mg Oral QAM AC    ipratropium-albuterol  1 ampule Inhalation 4x daily    aspirin  325 mg Oral Once    aspirin  81 mg Oral Daily    [Held by provider] enoxaparin  40 mg SubCUTAneous Daily    meropenem  1,000 mg IntraVENous Q8H    nystatin  5 mL Oral 4x Daily    sodium chloride flush  5-40 mL IntraVENous 2 times per day    atorvastatin  40 mg Oral Nightly    tiZANidine  4 mg Oral Nightly    gabapentin  600 mg Oral BID    DULoxetine  60 mg Oral BID    mometasone-formoterol  2 puff Inhalation BID     PRN Meds: traMADol, guaiFENesin-dextromethorphan, sodium chloride, calcium carbonate, potassium chloride, sodium chloride, sodium chloride flush, acetaminophen, perflutren lipid microspheres, traZODone, ipratropium-albuterol, cetirizine, albuterol sulfate HFA, glucose, dextrose bolus **OR** dextrose bolus, glucagon (rDNA), dextrose, ondansetron, polyethylene glycol      Intake/Output Summary (Last 24 hours) at 8/15/2022 1259  Last data filed at 8/15/2022 0457  Gross per 24 hour   Intake 240 ml   Output 275 ml   Net -35 ml         Exam:    /65   Pulse 99   Temp 97.5 °F (36.4 °C) (Axillary)   Resp 27   Ht 5' 4\" (1.626 m)   Wt 115 lb 4.8 oz (52.3 kg)   SpO2 96%   BMI 19.79 kg/m²     General appearance: Anxious+, appears stated age and cooperative. HEENT: Pupils equal, round, and reactive to light. Conjunctivae/corneas clear. Neck: Supple, with full range of motion. No jugular venous distention. Trachea midline. Respiratory:  Comfortably on Bipap. Crackles bibasally.   Cardiovascular: Regular rate and rhythm with normal S1/S2 without murmurs, rubs or gallops. Abdomen: Soft, non-tender, non-distended with normal bowel sounds. Musculoskeletal: No clubbing, cyanosis or edema bilaterally. Full range of motion without deformity. Skin: Skin color, texture, turgor normal.  No rashes or lesions. Neurologic:  Neurovascularly intact without any focal sensory/motor deficits. Cranial nerves: II-XII intact, grossly non-focal.  Psychiatric: Alert and oriented, thought content appropriate, normal insight  Capillary Refill: Brisk,< 3 seconds   Peripheral Pulses: +2 palpable, equal bilaterally       Labs:   Recent Labs     08/13/22  0418   WBC 10.8   HGB 8.6*   HCT 25.7*   *       Recent Labs     08/13/22  0417 08/14/22  0450 08/15/22  0607   * 133* 133*   K 4.0 3.4*  --    CL 96* 96* 98*   CO2 33* 30 24   BUN 12 14 12   CREATININE <0.5* <0.5* <0.5*   CALCIUM 8.7 8.4 8.4   PHOS 2.6 2.6 2.6       No results for input(s): AST, ALT, BILIDIR, BILITOT, ALKPHOS in the last 72 hours. No results for input(s): INR in the last 72 hours. No results for input(s): Leighton Gey in the last 72 hours. Urinalysis:      Lab Results   Component Value Date/Time    NITRU Negative 07/22/2022 06:31 AM    WBCUA 2 07/22/2022 06:31 AM    BACTERIA None Seen 07/22/2022 06:31 AM    RBCUA 5 07/22/2022 06:31 AM    BLOODU Negative 07/22/2022 06:31 AM    SPECGRAV 1.049 07/22/2022 06:31 AM    GLUCOSEU Negative 07/22/2022 06:31 AM       Radiology:  CT CHEST WO CONTRAST   Final Result   1. Multifocal pneumonia, primary in the right lung, with areas of improvement   since 07/23/2022. Right upper lobe pulmonary abscess is persistent, mildly   decreased with small amount of fluid in the interval.   2. Few foci of airspace disease in the left lower lobe are mildly increased. 3. Persistent small right pleural effusion. 4. Stable mediastinal adenopathy, likely reactive in the setting.          Fluoroscopy modified barium swallow with video   Final Result   No evidence of aspiration. Please see separate speech pathology report for full discussion of findings   and recommendations. RECOMMENDATIONS:   Unavailable         XR CHEST PORTABLE   Final Result   Left-sided PICC line tip is in the superior vena cava, the tip is turned   upward superiorly, a change from priors. Moderate right pleural effusion and right lung consolidations similar to   prior study. XR CHEST PORTABLE   Final Result   Increasing opacity throughout portions of the right lung, which can reflect   combination of airspace disease, atelectasis, and pleural effusion. XR CHEST PORTABLE   Final Result   Endotracheal tube with the tip approximately 1 cm above the song. Consider   retraction by approximately 3.5 cm. Enteric tube with the tip and the side   hole below the diaphragm overlying the left upper abdomen, likely within the   fundus of the stomach. Redemonstration of multifocal pneumonia affecting the right lung. Small   right pleural effusion. XR CHEST PORTABLE   Final Result   Unchanged multifocal right-sided pneumonia. XR CHEST PORTABLE   Final Result   Stable multifocal airspace disease in the right lung with probable upper lobe   abscess. XR CHEST PORTABLE   Final Result   Stable multifocal airspace disease in the right lung, including probable   pulmonary abscess in the right upper lobe. CT CHEST WO CONTRAST   Final Result   1. Mixed consolidative and ground-glass opacities as well as interstitial   thickening throughout the majority of the right lung compatible with   pneumonia. 2. There is a cavity in the right upper lobe demonstrating an air-fluid level   measuring up to 9.7 cm concerning for abscess formation. 3. Trace right pleural effusion. 4. Prominent and enlarged mediastinal lymph nodes, likely reactive. 5. Stable 6 mm left lower lobe pulmonary nodule.       RECOMMENDATIONS: Fleischner Society guidelines for follow-up and management of incidentally   detected pulmonary nodules:      Single Solid Nodule:      Nodule size less than 6 mm   In a low-risk patient, no routine follow-up. In a high-risk patient, optional CT at 12 months. Nodule size equals 6-8 mm   In a low-risk patient, CT at 6-12 months, then consider CT at 18-24 months. In a high-risk patient, CT at 6-12 months, then CT at 18-24 months. Nodule size greater than 8 mm         In a low-risk patient, consider CT at 3 months, PET/CT, or tissue sampling. In a high-risk patient, consider CT at 3 months, PET/CT, or tissue sampling. Multiple Solid Nodules:      Nodule size less than 6 mm   In a low-risk patient, no routine follow-up. In a high-risk patient, optional CT at 12 months. Nodule size equals 6-8 mm   In a low-risk patient, CT at 3-6 months, then consider CT at 18-24 months. In a high-risk patient, CT at 3-6 months, then CT at 18-24 months. Nodule size greater than 8 mm   In a low-risk patient, CT at 3-6 months, then consider CT at 18-24 months. In a high-risk patient, CT at 3-6 months, then CT at 18-24 months. - Low risk patients include individuals with minimal or absent history of   smoking and other known risk factors. - High risk patients include individuals with a history or smoking or known   risk factors. Radiology 2017 http://pubs. rsna.org/doi/full/10.1148/radiol. 2191042610         XR CHEST PORTABLE   Final Result   1. Increased pneumonia throughout the right lung remaining most prominent in   the right upper lobe. 2. Emphysema better demonstrated on CT. 3. Possible trace right pleural effusion.          XR CHEST PORTABLE   Final Result   Improved aeration of the right chest with persistent consolidation in the mid   to upper right chest.         CT CHEST WO CONTRAST    (Results Pending)           Assessment/Plan:    Active Hospital Problems    Diagnosis Date Noted    Hypoxia [R09.02] 08/13/2022     Priority: Medium    Acute respiratory insufficiency [R06.89] 08/04/2022     Priority: Medium    Acute blood loss anemia [D62] 08/04/2022     Priority: Medium    Hypotension due to drugs [I95.2] 08/04/2022     Priority: Medium    Massive hemoptysis [R04.2] 08/03/2022     Priority: Medium    Epistaxis [R04.0] 08/03/2022     Priority: Medium    Peripherally inserted central catheter (PICC) in place [Z45.2] 07/31/2022     Priority: Medium    NSTEMI (non-ST elevated myocardial infarction) ([facility] [I21.4] 07/26/2022     Priority: Medium    Necrotizing pneumonia ([facility] [address] [J85.0] 07/26/2022     Priority: Medium    Abscess of upper lobe of right lung with pneumonia ([facility] [address] [J85.1] 07/26/2022     Priority: Medium    Pseudomonas respiratory infection [J98.8, B96.5] 07/26/2022     Priority: Medium    Abnormal CT of the chest [R93.89] 07/26/2022     Priority: Medium    Acute on chronic respiratory failure with hypoxia and hypercapnia (HCC) [J96.21, J96.22] 07/18/2022     Priority: Medium    Abnormal EKG [R94.31] 07/18/2022     Priority: Medium    Chronic obstructive pulmonary disease ([address] [J44.9]      Priority: Medium    Cavitary pneumonia [J18.9, J98.4]      Priority: Medium    Pneumonia of right lung due to infectious organism [J18.9] 07/03/2022     Priority: Medium    COPD, severe ([address] [J44.9] 02/24/2017    Chronic pain syndrome [G89.4] 03/14/2016    Hypercholesteremia [E78.00] 11/04/2013    Fibromyalgia [M79.7]     Smoking [F17.200] 08/27/2011     Acute on chronic hypoxic respiratory failure   -Now on nasal cannula 6 L  -Try to wean O2 down to regular nasal cannula tolerated be discharged home  -Solu-Medrol started, for 5 days    2. 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 in situ   -CT surgery consulted:  would need pneumonectomy for which she is a poor candidate --> medical therapy only  - Respiratory sputum culture repeated on 7/30 without pseudomonnas  - Repeat CT chest on 8/11    Multifocal pneumonia, primary in the right lung, with areas of improvement   since 07/23/2022. Right upper lobe pulmonary abscess is persistent, mildly   decreased with small amount of fluid in the interval.         3. Epistaxis- resolved. Right nare Rhinorocket Dcd.  -DC lovenox.  -Keep ASA and prasugrel going  -HnH qd  -Transfuse PRBCs if hb is below 7.0  -Transfer out of ICU in next 24hrs        4. Critical coronary artery disease single-vessel  Status post PCI to left circumflex by Dr Ho Ospina on this admission  with stent  Now on antiplatelet therapy, aspirin and Plavix     5. Anemia, AOCD. Now has hemoptysis. FU iron studies TIBC ferritin % and reticulocyte count  No indication to transfusion yet       6. COPD, severe ([address] - without acute exacerbation. Will provide Nebulizer treatments as needed and continue home medications. Patient will be monitored closely, and deep breathing and coughing will be encouraged while awake. 7. Hyperlipidemia - No current evidence of Rhabdomyolysis or other adverse effects. Continue statin therapy while in the hospital     8. Chronic pain syndrome -continue her home pain med regimen     9. Fibromyalgia - provide supportive care    DVT Prophylaxis: heparin  Diet: ADULT ORAL NUTRITION SUPPLEMENT; Lunch, Breakfast, Dinner; Standard High Calorie/High Protein Oral Supplement  ADULT DIET;  Regular  Code Status: Limited    PT/OT Eval Status: evaluating    Dispo; stable for discharge to skilled nursing or Janice Méndez MD

## 2022-08-16 VITALS
HEIGHT: 64 IN | OXYGEN SATURATION: 93 % | WEIGHT: 115.74 LBS | HEART RATE: 87 BPM | BODY MASS INDEX: 19.76 KG/M2 | SYSTOLIC BLOOD PRESSURE: 109 MMHG | TEMPERATURE: 98.9 F | RESPIRATION RATE: 20 BRPM | DIASTOLIC BLOOD PRESSURE: 68 MMHG

## 2022-08-16 LAB
ANION GAP SERPL CALCULATED.3IONS-SCNC: 10 MMOL/L (ref 3–16)
BUN BLDV-MCNC: 16 MG/DL (ref 7–20)
CALCIUM SERPL-MCNC: 8.3 MG/DL (ref 8.3–10.6)
CHLORIDE BLD-SCNC: 100 MMOL/L (ref 99–110)
CO2: 26 MMOL/L (ref 21–32)
CREAT SERPL-MCNC: <0.5 MG/DL (ref 0.6–1.2)
GFR AFRICAN AMERICAN: >60
GFR NON-AFRICAN AMERICAN: >60
GLUCOSE BLD-MCNC: 105 MG/DL (ref 70–99)
MAGNESIUM: 1.8 MG/DL (ref 1.8–2.4)
PHOSPHORUS: 2.6 MG/DL (ref 2.5–4.9)
POTASSIUM REFLEX MAGNESIUM: 3.4 MMOL/L (ref 3.5–5.1)
SARS-COV-2: NOT DETECTED
SODIUM BLD-SCNC: 136 MMOL/L (ref 136–145)

## 2022-08-16 PROCEDURE — 80048 BASIC METABOLIC PNL TOTAL CA: CPT

## 2022-08-16 PROCEDURE — 97116 GAIT TRAINING THERAPY: CPT

## 2022-08-16 PROCEDURE — 6370000000 HC RX 637 (ALT 250 FOR IP): Performed by: STUDENT IN AN ORGANIZED HEALTH CARE EDUCATION/TRAINING PROGRAM

## 2022-08-16 PROCEDURE — 84100 ASSAY OF PHOSPHORUS: CPT

## 2022-08-16 PROCEDURE — 94761 N-INVAS EAR/PLS OXIMETRY MLT: CPT

## 2022-08-16 PROCEDURE — 6370000000 HC RX 637 (ALT 250 FOR IP): Performed by: INTERNAL MEDICINE

## 2022-08-16 PROCEDURE — 6370000000 HC RX 637 (ALT 250 FOR IP): Performed by: NURSE PRACTITIONER

## 2022-08-16 PROCEDURE — 97110 THERAPEUTIC EXERCISES: CPT

## 2022-08-16 PROCEDURE — 94640 AIRWAY INHALATION TREATMENT: CPT

## 2022-08-16 PROCEDURE — 99232 SBSQ HOSP IP/OBS MODERATE 35: CPT | Performed by: INTERNAL MEDICINE

## 2022-08-16 PROCEDURE — 6360000002 HC RX W HCPCS: Performed by: INTERNAL MEDICINE

## 2022-08-16 PROCEDURE — 97530 THERAPEUTIC ACTIVITIES: CPT

## 2022-08-16 PROCEDURE — 83735 ASSAY OF MAGNESIUM: CPT

## 2022-08-16 PROCEDURE — 2580000003 HC RX 258: Performed by: INTERNAL MEDICINE

## 2022-08-16 PROCEDURE — 94760 N-INVAS EAR/PLS OXIMETRY 1: CPT

## 2022-08-16 PROCEDURE — 2700000000 HC OXYGEN THERAPY PER DAY

## 2022-08-16 PROCEDURE — 51702 INSERT TEMP BLADDER CATH: CPT

## 2022-08-16 RX ADMIN — CLOPIDOGREL BISULFATE 75 MG: 75 TABLET ORAL at 08:41

## 2022-08-16 RX ADMIN — DULOXETINE HYDROCHLORIDE 60 MG: 60 CAPSULE, DELAYED RELEASE ORAL at 08:41

## 2022-08-16 RX ADMIN — GABAPENTIN 600 MG: 300 CAPSULE ORAL at 08:41

## 2022-08-16 RX ADMIN — IPRATROPIUM BROMIDE AND ALBUTEROL SULFATE 1 AMPULE: 2.5; .5 SOLUTION RESPIRATORY (INHALATION) at 11:55

## 2022-08-16 RX ADMIN — MEROPENEM 1000 MG: 1 INJECTION, POWDER, FOR SOLUTION INTRAVENOUS at 05:36

## 2022-08-16 RX ADMIN — SODIUM CHLORIDE SOLN NEBU 3% 15 ML: 3 NEBU SOLN at 08:25

## 2022-08-16 RX ADMIN — METOPROLOL SUCCINATE 12.5 MG: 25 TABLET, EXTENDED RELEASE ORAL at 08:41

## 2022-08-16 RX ADMIN — PANTOPRAZOLE SODIUM 40 MG: 40 TABLET, DELAYED RELEASE ORAL at 05:30

## 2022-08-16 RX ADMIN — MOMETASONE FUROATE AND FORMOTEROL FUMARATE DIHYDRATE 2 PUFF: 200; 5 AEROSOL RESPIRATORY (INHALATION) at 08:25

## 2022-08-16 RX ADMIN — METHYLPREDNISOLONE SODIUM SUCCINATE 40 MG: 40 INJECTION, POWDER, FOR SOLUTION INTRAMUSCULAR; INTRAVENOUS at 08:42

## 2022-08-16 RX ADMIN — IPRATROPIUM BROMIDE AND ALBUTEROL SULFATE 1 AMPULE: 2.5; .5 SOLUTION RESPIRATORY (INHALATION) at 08:25

## 2022-08-16 RX ADMIN — ASPIRIN 81 MG: 81 TABLET, CHEWABLE ORAL at 08:41

## 2022-08-16 RX ADMIN — NYSTATIN 500000 UNITS: 100000 SUSPENSION ORAL at 08:41

## 2022-08-16 RX ADMIN — SODIUM CHLORIDE, PRESERVATIVE FREE 10 ML: 5 INJECTION INTRAVENOUS at 08:54

## 2022-08-16 RX ADMIN — TOBRAMYCIN 300 MG: 300 SOLUTION RESPIRATORY (INHALATION) at 11:41

## 2022-08-16 RX ADMIN — Medication 1 CAPSULE: at 08:41

## 2022-08-16 RX ADMIN — TRAMADOL HYDROCHLORIDE 25 MG: 50 TABLET, COATED ORAL at 08:41

## 2022-08-16 ASSESSMENT — PAIN DESCRIPTION - DESCRIPTORS: DESCRIPTORS: ACHING

## 2022-08-16 ASSESSMENT — PAIN DESCRIPTION - LOCATION: LOCATION: BACK

## 2022-08-16 ASSESSMENT — PULMONARY FUNCTION TESTS: PEFR_L/MIN: 18

## 2022-08-16 ASSESSMENT — PAIN SCALES - GENERAL: PAINLEVEL_OUTOF10: 9

## 2022-08-16 NOTE — PROGRESS NOTES
Pulmonary Critical Care Progress Note     Patient's name:  Porfirio Gonzalez  Medical Record Number: [id number]  Patient's account/billing number: [de-identified]  Patient's YOB: 1945  Age: 78 y.o. Date of Admission: 7/18/2022  5:03 PM  Date of Consult: 8/16/2022      Primary Care Physician: HECTOR Barboza CNP      Code Status: Limited    Chief complaint: acute respiratory failure with hypoxia    Assessment and Plan     Acute respiratory failure with hypoxia  Necrotizing pseudomonas PNA  Massive Epistaxis s/p Rhinorocket removed  Moderately Severe copd     Plan:  Nebulized duoneb QID   Acapella  O2 to keep sat > 90%  Antibiotics per ID, meropenem   Ok to d/c from pulmonary stand point         Overnight:  Afebrile   On 4 L    REVIEW OF SYSTEMS:  Review of Systems -   General ROS: negative  Psychological ROS: negative  Ophthalmic ROS: negative  ENT ROS: mild right epistaxis   Allergy and Immunology ROS: negative  Hematological and Lymphatic ROS: negative  Endocrine ROS: negative  Breast ROS: negative  Respiratory ROS: cough and sob  Cardiovascular ROS: no chest pain or dyspnea on exertion  Gastrointestinal ROS:negative  Genito-Urinary ROS: negative  Musculoskeletal ROS: negative  Neurological ROS: negative  Dermatological ROS: negative        Physical Exam:    Vitals: /85   Pulse 88   Temp 97.8 °F (36.6 °C) (Oral)   Resp 13   Ht 5' 4\" (1.626 m)   Wt 115 lb 11.9 oz (52.5 kg)   SpO2 97%   BMI 19.87 kg/m²     Last Body weight:   Wt Readings from Last 3 Encounters:   08/16/22 115 lb 11.9 oz (52.5 kg)   07/13/22 133 lb 13.1 oz (60.7 kg)   06/20/22 138 lb (62.6 kg)       Body Mass Index : Body mass index is 19.87 kg/m².       Intake and Output summary:   Intake/Output Summary (Last 24 hours) at 8/16/2022 0953  Last data filed at 8/16/2022 0824  Gross per 24 hour   Intake 720 ml   Output 725 ml   Net -5 ml         Physical Examination:     Gen: no acute distress   Eyes: PERRL. Anicteric sclera. No conjunctival injection. ENT: No discharge. Posterior oropharynx clear. External appearance of ears and nose normal.  Neck: Trachea midline. No mass   Resp:  Moderate rhonchi and diminished on the right, no wheezing, mild increase wob  CV: Regular rate. Regular rhythm. No murmur or rub. No edema. GI: Soft, Non-tender. Non-distended. +BS  Skin: Warm, dry, w/o erythema. Lymph: No cervical or supraclavicular LAD. M/S: No cyanosis. No clubbing. Neuro:  CN 2-12 tested, no focal neurologic deficit. Moves all extremities  Psych: Awake and alert, Oriented x 3. Judgement and insight appropriate. Mood stable. Laboratory findings:-    CBC:   No results for input(s): WBC, HGB, PLT in the last 72 hours. BMP:    Recent Labs     08/14/22  0450 08/15/22  0607 08/16/22  0537   * 133* 136   K 3.4* 3.7 3.4*   CL 96* 98* 100   CO2 30 24 26   BUN 14 12 16   CREATININE <0.5* <0.5* <0.5*   GLUCOSE 92 90 105*       S. Calcium:  Recent Labs     08/16/22  0537   CALCIUM 8.3           Radiology Review:  Pertinent images / reports were reviewed as a part of this visit.   CXR reviewed extensive right airspace disease                     Mary Cordoba MD, M.D.            8/16/2022, 9:53 AM

## 2022-08-16 NOTE — CARE COORDINATION
CASE MANAGEMENT DISCHARGE SUMMARY:    DISCHARGE DATE: 8/16/2022    DISCHARGED TO: Spoke to [id number] [place] [address] in admission [phone], confirmed they are able to accept patient today.      Discharging to Facility/ Agency   Name: [facility]  [address]   Phone:  [phone]  Fax:  [phone]    TRANSPORTATION: [address]              TIME: 1500    PREFERRED PHARMACY: at facility     [address] [place] [address] obtained     NENITA/MASSIELR COMPLETED: completed    ULISES Reaves, PABLITO, Social Work/Case Management   [phone]  Electronically signed by ULISES Reaves, PABLITO on 8/16/2022 at 2:39 PM

## 2022-08-16 NOTE — PROGRESS NOTES
Physical Therapy  Facility/Department: [address] PROGRESSIVE CARE  Physical Therapy Treatment Note    Name: aHrley Hays  : 1945  MRN: [id number]  Date of Service: 2022    Discharge Recommendations:  Continue to assess pending progress, Subacute/Skilled Nursing Facility   PT Equipment Recommendations  Other: Defer to next level of care. Harley Hays scored a 15/24 on the AM-PAC short mobility form. Current research shows that an AM-PAC score of 17 or less is typically not associated with a discharge to the patient's home setting. Based on the patient's AM-PAC score and their current functional mobility deficits, it is recommended that the patient have 3-5 sessions per week of Physical Therapy at d/c to increase the patient's independence. Please see assessment section for further patient specific details. If patient discharges prior to next session this note will serve as a discharge summary. Please see below for the latest assessment towards goals. Assessment   Body Structures, Functions, Activity Limitations Requiring Skilled Therapeutic Intervention: Decreased functional mobility ; Decreased strength;Decreased endurance  Assessment: 79 y/o female admit 2022 with Pneumonia, Elevated Troponin. 2022 Cardiac Cath : NSTEMI.  8/3/2022 Pt transfer to ICU with worsening Respiratory Concerns. -2022 Pt Intubated. Recent hospital admit 7/3-2022 (admit from home) and d/c to SNF setting. PMH as noted including COPD, Lumbar Spondlyosis, Fibromyalgia. Pt admit from SNF setting; prior pt living with sign other in apt setting. Currently pt requiring  O2 4L via NC; sats remain >90%. Initial activity with O2 4L (desat quickly 70's); increase to 6L with only brief desat mid 80's. Quick recover at least 90% during brief seated rest breaks. Pt rell eob/oob and few short distances at bedside to/from chair with Summer Lloyd.   Endurance very limited; requiring very frequent seated rest breaks. Very slow progress given respiratory issues. At this time, would recommend cont PT (3-5) upon d/c. Therapy Prognosis: Fair  History: 79 y/o female admit 7/18/2022 with Pneumonia, Elevated Troponin. 7/19/2022 Cardiac Cath : NSTEMI.  8/3/2022 Pt transfer to ICU with worsening Respiratory Concerns. 8/l3-8/4/2022 Pt Intubated. Recent hospital admit 7/3-7/13/2022 (admit from home) and d/c to SNF setting. PMH as noted including COPD, Lumbar Spondlyosis, Fibromyalgia. Exam: See above. Clinical Presentation: See above. Barriers to Learning: Endurance. Requires PT Follow-Up: Yes  Activity Tolerance  Activity Tolerance: Patient limited by endurance  Activity Tolerance Comments: O2 4L via NC; sats remain >90%. Initial activity with O2 4L (desat quickly 70's); increase to 6L with only brief desat mid 80's. Quick recover at least 90% during brief seated rest breaks. Pt rell eob/oob and few short distances at bedside to/from chair with Chitra Stahl. Endurance very limited; requiring very frequent seated rest breaks. Plan   Plan  Plan: 3-5 times per week  Current Treatment Recommendations: Strengthening, Functional mobility training, Transfer training, Gait training, Endurance training, Safety education & training, Patient/Caregiver education & training  Safety Devices  Type of Devices: Call light within reach, Chair alarm in place, Left in chair, Nurse notified     Restrictions  Restrictions/Precautions  Restrictions/Precautions: Fall Risk  Position Activity Restriction  Other position/activity restrictions: O2 4L via NC; sats remain >90%. Initial activity with O2 4L (desat quickly 70's); increase to 6L with only brief desat mid 80's. Quick recover at least 90% during brief seated rest breaks. Subjective   General  Chart Reviewed: Yes  Patient assessed for rehabilitation services?: Yes  Additional Pertinent Hx: 79 y/o female admit 7/18/2022 with Pneumonia, Elevated Troponin. 7/19/2022 Cardiac Cath : NSTEMI.  8/3/2022 Pt transfer to ICU with worsening Respiratory Concerns. 8/l3-8/4/2022 Pt Intubated. Recent hospital admit 7/3-7/13/2022 (admit from home) and d/c to SNF setting. PMH as noted including COPD, Lumbar Spondlyosis, Fibromyalgia. Response To Previous Treatment: Patient with no complaints from previous session. Family / Caregiver Present: No  Referring Practitioner: Ju Ponce NP. Follows Commands: Within Functional Limits  Subjective  Subjective: Pt agreeable to PT Rx. Pt wants to be able to walk, be able to return home. Feeling alittle better. Social/Functional History  Social/Functional History  Lives With: Significant other (Ray)  Type of Home: Apartment (1st floor.)  Home Layout: One level (laundry in apt)  Home Access: Level entry  Bathroom Shower/Tub: Tub/Shower unit  Bathroom Toilet: Standard  Bathroom Equipment: Shower chair  Bathroom Accessibility: Accessible  Home Equipment: Orlebar Brown  ADL Assistance: Independent  Homemaking Assistance: Independent (Shared with sign other.)  Ambulation Assistance: Independent (Without assist device.)  Transfer Assistance: Independent  Active : No (Sign other drives.)  Patient's  Info: significant other drives  Occupation: Retired  Additional Comments: Pt recent admit 7/3/2022 with COPD Exac, ROLY, UTI and Sepsis and d/c'd on 7/13/2022 to Home at [facility] SNF setting. Vision/Hearing  Vision  Vision: Impaired  Vision Exceptions: Wears glasses for reading  Hearing  Hearing: Within functional limits    Cognition   Orientation  Overall Orientation Status: Within Functional Limits  Cognition  Overall Cognitive Status: WFL     Objective         Gross Assessment  AROM: Within functional limits  Strength: Generally decreased, functional (Grossly 4- 4/5.)           Bed mobility  Supine to Sit: Stand by assistance (HOB slightly elevated; Use of Bedrail.)  Transfers  Sit to Stand: Contact guard assistance (With [address] Cues for safe hand placement.)  Stand to sit: Contact guard assistance (With [address]. Cues for safe hand placement.)  Comment: Transfers from chair x 2, eob x 2. Ambulation  Surface: level tile  Device: Rolling Walker  Other Apparatus: O2 (O2 initially 4L; increase to 6L due to desat with activity.)  Assistance: Contact guard assistance;Minimal assistance  Distance: Pt amb chair to/from bad (4-5 steps with each attempt) with [employer] assist.  Flexed posture, very short shuffling steps. Slow/guarded. Requiring brief seated rest breaks. Very limited endurance; desat quickly with activitiy. A/AROM Exercises: Seated : Hip Flex, Hip Adduct Sets, Knee Ext, Ankle Pumps. AM-PAC Score  AM-PAC Inpatient Mobility Raw Score : 15 (08/16/22 0846)  AM-PAC Inpatient T-Scale Score : 39.45 (08/16/22 0846)  Mobility Inpatient CMS 0-100% Score: 57.7 (08/16/22 0846)  Mobility Inpatient CMS G-Code Modifier : CK (08/16/22 0846)          Goals  Short Term Goals  Time Frame for Short term goals: Upon d/c acute care setting. Short term goal 1: Bed Mob SBA. Short term goal 2: Transfers with assist device CGA. Short term goal 3: Amb with assist device 25' CGA. Short term goal 4: SpO2 levels to stay >90% with activity  Patient Goals   Patient goals : Get stronger and eventually return home. Education  Patient Education  Education Given To: Patient  Education Provided Comments: Role of PT, POC, Need to call for assist, LE Exs, Safe use of Walker, Optimal Breathing Techniques with use O2.   Education Method: Verbal;Demonstration  Education Outcome: Continued education needed      Therapy Time   Individual Concurrent Group Co-treatment   Time In 1314         Time Out 0830         Minutes [address], PT

## 2022-08-16 NOTE — PROGRESS NOTES
Discharge orders acknowledged by RN . Called and gave report to [place] [address]. AVS and KENNY were given to EMS. IV and telemetry monitor were removed. Pt vitals WDL. Patient went to [place] [address] with a PICC in place. All belongings were given to patients partner to take home.      Electronically signed by Nidia Bolden RN on 8/16/2022 at 2:56 PM

## 2022-08-16 NOTE — PLAN OF CARE
monitored and maintained or improved  8/16/2022 0226 by Susan Oliver RN  Outcome: Progressing     Problem: Nutrition Deficit:  Goal: Optimize nutritional status  8/16/2022 0226 by Susan Oliver RN  Outcome: Progressing

## 2022-08-16 NOTE — PLAN OF CARE
Problem: Discharge Planning  Goal: Discharge to home or other facility with appropriate resources  8/16/2022 1219 by Jany Ayala RN  Outcome: Completed     Problem: Pain  Goal: Verbalizes/displays adequate comfort level or baseline comfort level  8/16/2022 1219 by Jany Ayala RN  Outcome: Completed     Problem: Confusion  Goal: Confusion, delirium, dementia, or psychosis is improved or at baseline  Description: INTERVENTIONS:  1. Assess for possible contributors to thought disturbance, including medications, impaired vision or hearing, underlying metabolic abnormalities, dehydration, psychiatric diagnoses, and notify attending LIP  2. Rampart high risk fall precautions, as indicated  3. Provide frequent short contacts to provide reality reorientation, refocusing and direction  4. Decrease environmental stimuli, including noise as appropriate  5. Monitor and intervene to maintain adequate nutrition, hydration, elimination, sleep and activity  6. If unable to ensure safety without constant attention obtain sitter and review sitter guidelines with assigned personnel  7. Initiate Psychosocial CNS and Spiritual Care consult, as indicated  8/16/2022 1219 by Jany Ayala RN  Outcome: Completed     Problem: Skin/Tissue Integrity  Goal: Absence of new skin breakdown  Description: 1. Monitor for areas of redness and/or skin breakdown  2. Assess vascular access sites hourly  3. Every 4-6 hours minimum:  Change oxygen saturation probe site  4. Every 4-6 hours:  If on nasal continuous positive airway pressure, respiratory therapy assess nares and determine need for appliance change or resting period.   8/16/2022 1219 by Jany Ayala RN  Outcome: Completed     Problem: ABCDS Injury Assessment  Goal: Absence of physical injury  8/16/2022 1219 by Jany Ayala RN  Outcome: Completed     Problem: Chronic Conditions and Co-morbidities  Goal: Patient's chronic conditions and co-morbidity symptoms are monitored and maintained or improved  8/16/2022 1219 by Mayank Henry RN  Outcome: Completed     Problem: Nutrition Deficit:  Goal: Optimize nutritional status  8/16/2022 1219 by Mayank Henry RN  Outcome: Completed     Problem: Safety - Medical Restraint  Goal: Remains free of injury from restraints (Restraint for Interference with Medical Device)  Description: INTERVENTIONS:  1. Determine that other, less restrictive measures have been tried or would not be effective before applying the restraint  2. Evaluate the patient's condition at the time of restraint application  3. Inform patient/family regarding the reason for restraint  4.  Q2H: Monitor safety, psychosocial status, comfort, nutrition and hydration  8/16/2022 1219 by Mayank Henry RN  Outcome: Completed

## 2022-08-17 ENCOUNTER — TELEPHONE (OUTPATIENT)
Dept: INFECTIOUS DISEASES | Age: 77
End: 2022-08-17

## 2022-08-17 ENCOUNTER — PHARMACY VISIT (OUTPATIENT)
Dept: INFECTIOUS DISEASES | Age: 77
End: 2022-08-17

## 2022-08-17 DIAGNOSIS — J18.9 UNRESOLVED PNEUMONIA: Primary | ICD-10-CM

## 2022-08-17 DIAGNOSIS — J18.9 PNEUMONIA OF RIGHT UPPER LOBE DUE TO INFECTIOUS ORGANISM: Primary | ICD-10-CM

## 2022-08-17 DIAGNOSIS — J85.0 NECROTIZING PNEUMONIA (HCC): ICD-10-CM

## 2022-08-17 NOTE — TELEPHONE ENCOUNTER
----- Message from Imani Smith [facility] sent at 8/17/2022 10:58 AM EDT -----  Please also see if SNF can do meropenem 1gm q8hr instead of q12hr

## 2022-08-17 NOTE — PROGRESS NOTES
Dr. Misty Molina has placed a referral order for pharmacist to manage Outpatient Parental Antimicrobial Therapy (OPAT) pursuant the ID Collaborative Practice Agreement. Patient and/or caregiver has verbally consented to have drug therapy managed by a pharmacist. The benefits and risks of complex antimicrobial therapy including drug-specific adverse reactions and necessary follow-up were discussed with patient and/or caregiver and they are amenable to OPAT and pharmacist management. Pertinent Objective Data:     Wt Readings from Last 1 Encounters:   08/16/22 115 lb 11.9 oz (52.5 kg)      BMI Readings from Last 1 Encounters:   08/16/22 19.87 kg/m²      Serum creatinine: 0.5 mg/dL   Estimated creatinine clearance: 81 mL/min    Lab Results   Component Value Date     08/16/2022    K 3.4 (L) 08/16/2022     08/16/2022    CO2 26 08/16/2022    BUN 16 08/16/2022    CREATININE <0.5 (L) 08/16/2022    GLUCOSE 105 (H) 08/16/2022    CALCIUM 8.3 08/16/2022    PROT 5.6 (L) 07/18/2022    LABALBU 2.4 (L) 07/18/2022    BILITOT 0.5 07/18/2022    ALKPHOS 88 07/18/2022    AST 61 (H) 07/18/2022    ALT 65 (H) 07/18/2022    LABGLOM >60 08/16/2022    GFRAA >60 08/16/2022    AGRATIO 0.8 (L) 07/18/2022    GLOB 2.6 02/22/2021       Lab Results   Component Value Date    WBC 10.8 08/13/2022    HGB 8.6 (L) 08/13/2022    HCT 25.7 (L) 08/13/2022    MCV 87.7 08/13/2022     (H) 08/13/2022    LYMPHOPCT 24.0 08/13/2022    RBC 2.93 (L) 08/13/2022    MCH 29.3 08/13/2022    MCHC 33.5 08/13/2022    RDW 15.3 08/13/2022         OPAT Orders:  Organism/Diagnosis  AOCRF + Cavitary PNA- medical management     7/22 sputum resp culture: PsA  7/30 sputum resp culture: NGTD   Antimicrobial Regimen and Projected End Date IV meropenem 1gm q12hr- 8/30  This will need to be increased to 1gm q8hr for adequate renal function, will call RN and see if SNF can accommodate   Inhaled nima nebs 300 mg BID- 8/30   Weekly Lab Monitoring CBCwDiff, CMP, CRP, and ESR   Repeat Imaging Needed Needs CT preferably prior to term date; do not see it scheduled yet but the order is in   Follow up in [address] No follow up if she does well   LDA/OPAT Access L double lumen PICC   LTAC/SNF [facility] [phone]     Lab and medication orders have been placed. Clinical Pharmacist will review patient weekly or as needed and make recommendations regarding the above therapy plan. Thank you,  Sophie Peterson, PharmD, [facility]  [facility] [address] [facility]  [address]   [place], [address] [place] [address]  Phone: [phone] (also available on [employer])  Fax: [phone]    For Pharmacy [address] [place] [address] in place:  Yes  Time Spent (min): 20

## 2022-08-17 NOTE — TELEPHONE ENCOUNTER
Spoke with patients nurse Melissa at [facility] to advise of pharm note. She requests this be faxed to [phone], fax successful. Reviewed all OPAT orders and she verbalizes understanding. Informed of CT appt and she verbalizes understanding.

## 2022-08-23 LAB
ALBUMIN SERPL-MCNC: 2.4 G/DL
ALP BLD-CCNC: 61 U/L
ALT SERPL-CCNC: 32 U/L
ANION GAP SERPL CALCULATED.3IONS-SCNC: NORMAL MMOL/L
AST SERPL-CCNC: 15 U/L
BASOPHILS ABSOLUTE: ABNORMAL
BASOPHILS RELATIVE PERCENT: 0.4 %
BILIRUB SERPL-MCNC: 0.5 MG/DL (ref 0.1–1.4)
BUN BLDV-MCNC: 10 MG/DL
C-REACTIVE PROTEIN: 46.2
CALCIUM SERPL-MCNC: 8.1 MG/DL
CHLORIDE BLD-SCNC: 100 MMOL/L
CO2: 35 MMOL/L
CREAT SERPL-MCNC: 0.5 MG/DL
EOSINOPHILS ABSOLUTE: 0.8 /ΜL
EOSINOPHILS RELATIVE PERCENT: 10.2 %
GFR CALCULATED: NORMAL
GLUCOSE BLD-MCNC: 77 MG/DL
HCT VFR BLD CALC: 28.4 % (ref 36–46)
HEMOGLOBIN: 9.1 G/DL (ref 12–16)
LYMPHOCYTES ABSOLUTE: 1.1 /ΜL
LYMPHOCYTES RELATIVE PERCENT: 21.1 %
MCH RBC QN AUTO: 29.4 PG
MCHC RBC AUTO-ENTMCNC: 31.9 G/DL
MCV RBC AUTO: 92.1 FL
MONOCYTES ABSOLUTE: 0.7 /ΜL
MONOCYTES RELATIVE PERCENT: 9 %
NEUTROPHILS ABSOLUTE: 4.6 /ΜL
NEUTROPHILS RELATIVE PERCENT: 59.3 %
PDW BLD-RTO: 18.3 %
PLATELET # BLD: 530 K/ΜL
PMV BLD AUTO: 7.2 FL
POTASSIUM SERPL-SCNC: 3.2 MMOL/L
RBC # BLD: 3.08 10^6/ΜL
SEDIMENTATION RATE, ERYTHROCYTE: 8
SODIUM BLD-SCNC: 142 MMOL/L
TOTAL PROTEIN: 4.8
WBC # BLD: 7.8 10^3/ML

## 2022-08-24 ENCOUNTER — TELEPHONE (OUTPATIENT)
Dept: INFECTIOUS DISEASES | Age: 77
End: 2022-08-24

## 2022-08-24 DIAGNOSIS — R42 DIZZINESS: ICD-10-CM

## 2022-08-24 DIAGNOSIS — J85.0 NECROTIZING PNEUMONIA (HCC): ICD-10-CM

## 2022-08-24 DIAGNOSIS — R55 SYNCOPE AND COLLAPSE: ICD-10-CM

## 2022-08-24 DIAGNOSIS — R51.9 ACUTE NONINTRACTABLE HEADACHE, UNSPECIFIED HEADACHE TYPE: ICD-10-CM

## 2022-08-25 DIAGNOSIS — M81.0 AGE-RELATED OSTEOPOROSIS WITHOUT CURRENT PATHOLOGICAL FRACTURE: ICD-10-CM

## 2022-08-25 RX ORDER — ALENDRONATE SODIUM 70 MG/1
TABLET ORAL
Qty: 12 TABLET | Refills: 1 | Status: SHIPPED | OUTPATIENT
Start: 2022-08-25

## 2022-08-26 ENCOUNTER — OFFICE VISIT (OUTPATIENT)
Dept: CARDIOLOGY CLINIC | Age: 77
End: 2022-08-26
Payer: MEDICARE

## 2022-08-26 VITALS
BODY MASS INDEX: 20.62 KG/M2 | SYSTOLIC BLOOD PRESSURE: 114 MMHG | HEIGHT: 64 IN | HEART RATE: 106 BPM | OXYGEN SATURATION: 92 % | WEIGHT: 120.8 LBS | DIASTOLIC BLOOD PRESSURE: 64 MMHG

## 2022-08-26 DIAGNOSIS — I21.4 NSTEMI (NON-ST ELEVATED MYOCARDIAL INFARCTION) (HCC): Primary | ICD-10-CM

## 2022-08-26 PROCEDURE — 1111F DSCHRG MED/CURRENT MED MERGE: CPT | Performed by: INTERNAL MEDICINE

## 2022-08-26 PROCEDURE — G8427 DOCREV CUR MEDS BY ELIG CLIN: HCPCS | Performed by: INTERNAL MEDICINE

## 2022-08-26 PROCEDURE — 99214 OFFICE O/P EST MOD 30 MIN: CPT | Performed by: INTERNAL MEDICINE

## 2022-08-26 PROCEDURE — 93000 ELECTROCARDIOGRAM COMPLETE: CPT | Performed by: INTERNAL MEDICINE

## 2022-08-26 PROCEDURE — 1123F ACP DISCUSS/DSCN MKR DOCD: CPT | Performed by: INTERNAL MEDICINE

## 2022-08-26 PROCEDURE — G8420 CALC BMI NORM PARAMETERS: HCPCS | Performed by: INTERNAL MEDICINE

## 2022-08-26 PROCEDURE — G8399 PT W/DXA RESULTS DOCUMENT: HCPCS | Performed by: INTERNAL MEDICINE

## 2022-08-26 PROCEDURE — 1036F TOBACCO NON-USER: CPT | Performed by: INTERNAL MEDICINE

## 2022-08-26 PROCEDURE — 3017F COLORECTAL CA SCREEN DOC REV: CPT | Performed by: INTERNAL MEDICINE

## 2022-08-26 PROCEDURE — 1090F PRES/ABSN URINE INCON ASSESS: CPT | Performed by: INTERNAL MEDICINE

## 2022-08-26 RX ORDER — TRAMADOL HYDROCHLORIDE 50 MG/1
50 TABLET ORAL EVERY 6 HOURS PRN
COMMUNITY
End: 2022-09-20

## 2022-08-26 NOTE — PROGRESS NOTES
Cardiology Consultation     Referring Physician: Mil Hoyos CNP   Reason for Consultation: syncope   Chief Complaint:   Chief Complaint   Patient presents with    Follow-Up from [facility] f/u [facility] PCI     Subjective:   History of Present Illness:  Fanny Jackson is a 78 y.o. female with past medical history significant for CAD, hyperlipidemia, hypertension, COPD and type 2 diabetes. She presented to [facility] with complaints of worsening shortness of breath following admission for pneumonia. She was found to have NSTEMI and underwent LHC on 22 resulting in KIRILL to [place] [address]. She develop ongoing respiratory failure with cavitatory pneumonia. She also developed sever epistaxis and antiplatelets were temporarily held during admission. She presents today for follow up. She reports feeling well overall. She does report residual weakness. She denies chest pain with rest or exertion. Her breathing has been comfortable without shortness of breath. Patient denies exertional chest pain/pressure, dyspnea at rest, OSBORN, PND, orthopnea, palpitations, lightheadedness, weight changes, changes in LE edema, and syncope. Prior cardiac testing:    EK20 SR, ~67 bpm     [facility] 22  HEMODYNAMICS:  Aortic pressure was 140/80;  LVEDP 16. There was no gradient between the left ventricle and aorta. ANGIOGRAM/CORONARY ARTERIOGRAM:     The left main coronary artery is normal .  The left anterior descending artery has mild diffuse disease . The left circumflex artery is normal              OM1 99% .    The right coronary artery is normal .     SUMMARY:   Single vessel CAD      2022  Selective angiography with PCI:  ANGIOGRAM/CORONARY ARTERIOGRAM:        The left circumflex artery has mild disease              Om1 99%    INTERVENTION  Guide catheter: XB 3.5 Guide  Runthrough wire used to cross OM1 lesion  Predilation with 3.0 regular balllon  IVUS passed to distal OM, reference diameter 3 mm  Haily 3.0 X 15 mm KIRILL  Post dilation with 3.0 NC balloon to 15 ALEXEI      SUMMARY:   Single vessel CAD  S/p successful IVUS guided PCI OM1 X 1 KIRILL      Echo 7/19/2022   Left ventricular cavity size is normal with mild LV hypertrophy and normal systolic function. Ejection fraction is visually estimated to be 55-60%. There are no regional wall motion abnormalities noted. Grade I diastolic dysfunction noted. The right ventricle is normal in size and function. There are no significant valvular abnormalities appreciated in this study. Past Medical History:   has a past medical history of CAD (coronary artery disease), Chronic pain syndrome, Colorectal polyps, COPD (chronic obstructive pulmonary disease) (HCC), CTS (carpal tunnel syndrome), DDD (degenerative disc disease), lumbar, Depression, Family hx of colon cancer, Fibromyalgia, Hyperlipemia, IBS (irritable bowel syndrome), Lumbar spondylosis, New onset type 2 diabetes mellitus ([address] On home O2, Pneumonia, and Urticaria, chronic. Surgical History:   has a past surgical history that includes Tympanostomy tube placement; Cervical polyp removal (09/2004); Carpal tunnel release (Bilateral); Tubal ligation; Intracapsular cataract extraction (Left, 06/23/2020); Intracapsular cataract extraction (Right, 07/14/2020); Coronary angioplasty with stent (07/19/2022); Cataract extraction; bronchoscopy (N/A, 08/03/2022); Diagnostic Cardiac Cath Lab Procedure (Left, 07/19/2022); and Cardiac catheterization (07/21/2022). Social History:   reports that she has been smoking cigarettes. She started smoking about 60 years ago. She has a 55.00 pack-year smoking history. She has never used smokeless tobacco. She reports that she does not drink alcohol and does not use drugs.      Family History:  family history includes Alzheimer's Disease in her mother; Cancer in her father; Diabetes in her daughter, daughter, and daughter; Heart Disease in her brother; Heart Failure in her brother. Allergies:  Patient has no known allergies. Review of Systems: refer to HPI   Constitutional: no unanticipated weight loss. There's been no change in energy level, sleep pattern, or activity level. No fevers, chills. Eyes: No visual changes or diplopia. No scleral icterus. ENT: No Headaches, hearing loss or vertigo. No mouth sores or sore throat. Cardiovascular: as reviewed in HPI  Respiratory: + SOB. No cough or wheezing, no sputum production. No hemoptysis. Gastrointestinal: No abdominal pain, appetite loss, blood in stools. No change in bowel or bladder habits. Genitourinary: No dysuria, trouble voiding, or hematuria. Musculoskeletal:  No gait disturbance, no joint complaints. Integumentary: No rash or pruritis. Neurological: No headache, diplopia, change in muscle strength, numbness or tingling. Psychiatric: No anxiety or depression. Endocrine: No temperature intolerance. No excessive thirst, fluid intake, or urination. No tremor. Hematologic/Lymphatic: No abnormal bruising or bleeding, blood clots or swollen lymph nodes. Allergic/Immunologic: No nasal congestion or hives. Vascular : BLE edema     Objective:   PHYSICAL EXAM:    Vitals:    08/26/22 1600   BP: 114/64   Pulse: (!) 106   SpO2: 92%      Weight: 120 lb 12.8 oz (54.8 kg)       General Appearance:  Alert, cooperative, no distress, appears stated age. Head:  Normocephalic, without obvious abnormality, atraumatic. Eyes:  Pupils equal and round. No scleral icterus. Mouth: Moist mucosa, no pharyngeal erythema. Nose: Nares normal. No drainage or sinus tenderness. Neck: Supple, symmetrical, trachea midline. No adenopathy. No tenderness/mass/nodules. No carotid bruit or elevated JVD. Lungs:   Clear to auscultation bilaterally, respirations unlabored. No wheeze, rales, or rhonchi. Chest Wall:  No tenderness or deformity. Heart:  Regular rate.  S1/S2 normal. No murmur, rub, or gallop. Abdomen:   Soft, non-tender, bowel sounds active. Musculoskeletal: No muscle wasting or digital clubbing. Extremities: Extremities normal, atraumatic. No cyanosis or edema. Pulses: 2+ radial and carotid pulses, symmetric. Skin: No rashes or lesions. Pysch: Normal mood and affect. Alert and oriented x 4. Neurologic: Normal gross motor and sensory exam.       Labs     Lab Results   Component Value Date/Time    WBC 7.8 08/23/2022 04:48 AM    RBC 3.08 08/23/2022 04:48 AM    HGB 9.1 08/23/2022 04:48 AM    HCT 28.4 08/23/2022 04:48 AM    MCV 92.1 08/23/2022 04:48 AM    RDW 18.3 08/23/2022 04:48 AM     08/23/2022 04:48 AM     Lab Results   Component Value Date/Time     08/23/2022 04:48 AM    K 3.2 08/23/2022 04:48 AM    K 3.4 08/16/2022 05:37 AM     08/23/2022 04:48 AM    CO2 35 08/23/2022 04:48 AM    BUN 10 08/23/2022 04:48 AM    CREATININE 0.5 08/23/2022 04:48 AM    GFRAA >60 08/16/2022 05:37 AM    GFRAA >60 10/12/2012 11:00 AM    AGRATIO 0.8 07/18/2022 05:20 PM    LABGLOM >60 08/16/2022 05:37 AM    GLUCOSE 77 08/23/2022 04:48 AM    PROT 5.6 07/18/2022 05:20 PM    PROT 6.9 10/12/2012 11:00 AM    CALCIUM 8.1 08/23/2022 04:48 AM    BILITOT 0.5 08/23/2022 04:48 AM    ALKPHOS 61 08/23/2022 04:48 AM    AST 15 08/23/2022 04:48 AM    ALT 32 08/23/2022 04:48 AM       No results found for: PTINR  Lab Results   Component Value Date    LABA1C 6.0 07/03/2022     Lab Results   Component Value Date    TROPONINI 0.52 ([place]) 07/18/2022       CURRENT Medications:  Current Outpatient Medications on File Prior to Visit   Medication Sig Dispense Refill    traMADol (ULTRAM) 50 MG tablet Take 50 mg by mouth every 6 hours as needed for Pain. alendronate (FOSAMAX) 70 MG tablet TAKE 1 TABLET BY MOUTH ONE TIME PER WEEK 12 tablet 1    meropenem (MERREM) infusion Infuse 1,000 mg intravenously in the morning and 1,000 mg at noon and 1,000 mg in the evening. Do all this for 13 days.  Compound per protocol. . 1 each 0    aspirin 81 MG EC tablet Take 1 tablet by mouth in the morning. 30 tablet 3    prasugrel (EFFIENT) 10 MG TABS Take 1 tablet by mouth in the morning. 30 tablet 1    gabapentin (NEURONTIN) 600 MG tablet Take 1 tablet by mouth 2 times daily for 30 days.  60 tablet 0    nystatin (MYCOSTATIN) 534423 UNIT/ML suspension Take 5 mLs by mouth 4 times daily 60 mL 0    benzocaine-menthol (CEPACOL SORE THROAT) 15-3.6 MG lozenge Take 1 lozenge by mouth every 2 hours as needed for Sore Throat 30 lozenge 0    DULoxetine (CYMBALTA) 60 MG extended release capsule TAKE 1 CAPSULE BY MOUTH TWICE A DAY 60 capsule 2    pantoprazole (PROTONIX) 40 MG tablet Take 1 tablet by mouth daily 30 tablet 1    tiZANidine (ZANAFLEX) 4 MG tablet Take 1 tablet by mouth nightly 30 tablet 1    traZODone (DESYREL) 50 MG tablet Take 1-2 tablets by mouth nightly 60 tablet 1    Fluticasone-Umeclidin-Vilant (TRELEGY ELLIPTA) 200-62.5-25 MCG/INH AEPB Inhale 1 Inhaler into the lungs daily 1 each 5    albuterol sulfate  (90 Base) MCG/ACT inhaler Inhale 2 puffs into the lungs every 6 hours as needed for Shortness of Breath 1 each 11    Calcium Citrate-Vitamin D 500-500 MG-UNIT CHEW Take 1 tablet by mouth 2 times daily      ONE TOUCH LANCETS MISC 1 each by Does not apply route daily 100 each 3    ipratropium-albuterol (DUONEB) 0.5-2.5 (3) MG/3ML SOLN nebulizer solution Take 1 aerosol every 4 hours for 2 days and then as needed for shortness of breath coughing and wheezing 360 mL 3    Cholecalciferol (VITAMIN D) 2000 units TABS tablet Take 1 tablet by mouth daily 30 tablet     cetirizine (ZYRTEC) 10 MG tablet Take 10 mg by mouth daily as needed for Allergies      Nebulizers (COMPRESSOR/NEBULIZER) MISC 1 Units by Does not apply route 4 times daily 1 each 3    [DISCONTINUED] atorvastatin (LIPITOR) 80 MG tablet TAKE 1 TABLET BY MOUTH EVERY DAY FOR CHOLESTEROL 90 tablet 1    [DISCONTINUED] meloxicam (MOBIC) 15 MG tablet Take 1 tablet by mouth daily 30 tablet 1     No current facility-administered medications on file prior to visit. Assessment and Plan   1) CAD   7/19/22 KIRILL to [facility]   Continue Effient and ASA for DAPT   She may discontinue Effient after 6 months of therapy given prior bleeding episode  No further recurrence of epistaxis     2) Hyperlipidemia   Remain on statin therapy   LDL goal <70 mg/dL     3) COPD, severe   Managed per pulmonary     Follow up in 6 months     Thank you for allowing us to participate in the care of Clay Wilkerson. Cyndee Rush MD [address] [place] [address] and Interventional Cardiology   [facility]   (D): [phone]   (F): [phone]    This note was scribed in the presence of Cyndee Rush MD by [employer], RN. Physician Attestation:  The scribes documentation has been prepared under my direction and personally reviewed by me in its entirety. I confirm the note above accurately reflects all work, treatment, procedures, and medical decision making performed by me.     Electronically signed by Quan Mejia MD on 9/18/2022 at 11:29 PM

## 2022-08-30 ENCOUNTER — TELEPHONE (OUTPATIENT)
Dept: INFECTIOUS DISEASES | Age: 77
End: 2022-08-30

## 2022-08-30 ENCOUNTER — HOSPITAL ENCOUNTER (OUTPATIENT)
Dept: CT IMAGING | Age: 77
Discharge: HOME | End: 2022-08-30
Payer: MEDICARE

## 2022-08-30 DIAGNOSIS — J18.9 PNEUMONIA OF RIGHT LUNG DUE TO INFECTIOUS ORGANISM, UNSPECIFIED PART OF LUNG: ICD-10-CM

## 2022-08-30 PROCEDURE — 71250 CT THORAX DX C-: CPT

## 2022-08-30 NOTE — TELEPHONE ENCOUNTER
Her CT chest is still pending from today. Please call and have them result it.      Will want to see resolution of PNA

## 2022-08-31 ENCOUNTER — TELEPHONE (OUTPATIENT)
Dept: INFECTIOUS DISEASES | Age: 77
End: 2022-08-31

## 2022-08-31 LAB
ALBUMIN SERPL-MCNC: 2.7 G/DL
ALP BLD-CCNC: 72 U/L
ALT SERPL-CCNC: 11 U/L
ANION GAP SERPL CALCULATED.3IONS-SCNC: NORMAL MMOL/L
AST SERPL-CCNC: 9 U/L
BASOPHILS ABSOLUTE: ABNORMAL
BASOPHILS RELATIVE PERCENT: 0.6 %
BILIRUB SERPL-MCNC: 0.3 MG/DL (ref 0.1–1.4)
BUN BLDV-MCNC: 8 MG/DL
C-REACTIVE PROTEIN: 75.2
CALCIUM SERPL-MCNC: 8.4 MG/DL
CHLORIDE BLD-SCNC: 102 MMOL/L
CO2: 28 MMOL/L
CREAT SERPL-MCNC: 0.6 MG/DL
EOSINOPHILS ABSOLUTE: 0.5 /ΜL
EOSINOPHILS RELATIVE PERCENT: 7.5 %
GFR CALCULATED: NORMAL
GLUCOSE BLD-MCNC: 91 MG/DL
HCT VFR BLD CALC: 24.8 % (ref 36–46)
HEMOGLOBIN: 8.1 G/DL (ref 12–16)
LYMPHOCYTES ABSOLUTE: 2 /ΜL
LYMPHOCYTES RELATIVE PERCENT: 33.4 %
MCH RBC QN AUTO: 28.8 PG
MCHC RBC AUTO-ENTMCNC: 32.8 G/DL
MCV RBC AUTO: 87.9 FL
MONOCYTES ABSOLUTE: 0.5 /ΜL
MONOCYTES RELATIVE PERCENT: 7.8 %
NEUTROPHILS ABSOLUTE: 3 /ΜL
NEUTROPHILS RELATIVE PERCENT: 50.7 %
PDW BLD-RTO: 17.7 %
PLATELET # BLD: 378 K/ΜL
PMV BLD AUTO: 7.2 FL
POTASSIUM SERPL-SCNC: 4.2 MMOL/L
RBC # BLD: 2.82 10^6/ΜL
SEDIMENTATION RATE, ERYTHROCYTE: 24
SODIUM BLD-SCNC: 140 MMOL/L
TOTAL PROTEIN: 5.3
WBC # BLD: 6 10^3/ML

## 2022-08-31 RX ORDER — TOBRAMYCIN INHALATION SOLUTION 300 MG/5ML
300 INHALANT RESPIRATORY (INHALATION) 2 TIMES DAILY
Qty: 210 ML | Refills: 0
Start: 2022-08-31 | End: 2022-09-15

## 2022-08-31 NOTE — TELEPHONE ENCOUNTER
Spoke with RN Skip Obregon at [facility]. Nurse verbalized understanding to extend IV ABX until 9/21/22. Order faxed to [phone] per RN's request. Pt scheduled for F/U on 9/21/22. This office also requested this weeks labs be faxed to us. Thanks.

## 2022-08-31 NOTE — TELEPHONE ENCOUNTER
CT chest images reviewed Rt lower lobe PNA resolved but upper lobe cavitation continues as expected will have to extend iv Meropenem and tobramycin inhalation for x  3 weeks there are no oral abx options as Pseudomonas is Quinolone resistant - follow up clinic visit in person  3 weeks

## 2022-09-01 DIAGNOSIS — J85.0 NECROTIZING PNEUMONIA (HCC): ICD-10-CM

## 2022-09-02 ENCOUNTER — TELEPHONE (OUTPATIENT)
Dept: FAMILY MEDICINE CLINIC | Age: 77
End: 2022-09-02

## 2022-09-07 ENCOUNTER — TELEPHONE (OUTPATIENT)
Dept: INFECTIOUS DISEASES | Age: 77
End: 2022-09-07

## 2022-09-07 NOTE — TELEPHONE ENCOUNTER
Spoke with Tala  at [address] pt will DC home today with [facility]  and [employer]. Spoke with Ofelia Arboleda pharmacist at [facility], gave verbal orders for Meropenem and labs. Ofelia Arboleda stated [facility] will not supply Tobra pt will have to take script to a local pharamcy. Relayed Tobra info to [place], she verbalized understanding to have pt's daughter take script to a local pharmacy. Requested return call from pt's nurse Vianney to request labs be drawn before discharge. Thanks. Spoke with staff at [facility] [phone] gave verbal orders for PICC care and lab draws.  Orders also faxed per their request.

## 2022-09-08 ENCOUNTER — OFFICE VISIT (OUTPATIENT)
Dept: FAMILY MEDICINE CLINIC | Age: 77
End: 2022-09-08
Payer: MEDICARE

## 2022-09-08 VITALS
HEART RATE: 100 BPM | DIASTOLIC BLOOD PRESSURE: 62 MMHG | RESPIRATION RATE: 18 BRPM | WEIGHT: 115 LBS | BODY MASS INDEX: 19.63 KG/M2 | TEMPERATURE: 98.3 F | SYSTOLIC BLOOD PRESSURE: 112 MMHG | OXYGEN SATURATION: 98 % | HEIGHT: 64 IN

## 2022-09-08 DIAGNOSIS — J85.0 NECROTIZING PNEUMONIA (HCC): ICD-10-CM

## 2022-09-08 DIAGNOSIS — I25.10 CORONARY ARTERY DISEASE INVOLVING NATIVE CORONARY ARTERY OF NATIVE HEART WITHOUT ANGINA PECTORIS: ICD-10-CM

## 2022-09-08 DIAGNOSIS — J96.21 ACUTE ON CHRONIC RESPIRATORY FAILURE WITH HYPOXIA (HCC): ICD-10-CM

## 2022-09-08 DIAGNOSIS — Z23 NEED FOR INFLUENZA VACCINATION: ICD-10-CM

## 2022-09-08 DIAGNOSIS — J44.9 COPD, SEVERE (HCC): ICD-10-CM

## 2022-09-08 DIAGNOSIS — Z09 HOSPITAL DISCHARGE FOLLOW-UP: Primary | ICD-10-CM

## 2022-09-08 PROCEDURE — 1036F TOBACCO NON-USER: CPT | Performed by: NURSE PRACTITIONER

## 2022-09-08 PROCEDURE — 1111F DSCHRG MED/CURRENT MED MERGE: CPT | Performed by: NURSE PRACTITIONER

## 2022-09-08 PROCEDURE — 90694 VACC AIIV4 NO PRSRV 0.5ML IM: CPT | Performed by: NURSE PRACTITIONER

## 2022-09-08 PROCEDURE — 1123F ACP DISCUSS/DSCN MKR DOCD: CPT | Performed by: NURSE PRACTITIONER

## 2022-09-08 PROCEDURE — G8420 CALC BMI NORM PARAMETERS: HCPCS | Performed by: NURSE PRACTITIONER

## 2022-09-08 PROCEDURE — G8427 DOCREV CUR MEDS BY ELIG CLIN: HCPCS | Performed by: NURSE PRACTITIONER

## 2022-09-08 PROCEDURE — 3023F SPIROM DOC REV: CPT | Performed by: NURSE PRACTITIONER

## 2022-09-08 PROCEDURE — 99214 OFFICE O/P EST MOD 30 MIN: CPT | Performed by: NURSE PRACTITIONER

## 2022-09-08 PROCEDURE — 1090F PRES/ABSN URINE INCON ASSESS: CPT | Performed by: NURSE PRACTITIONER

## 2022-09-08 PROCEDURE — G0008 ADMIN INFLUENZA VIRUS VAC: HCPCS | Performed by: NURSE PRACTITIONER

## 2022-09-08 PROCEDURE — G8399 PT W/DXA RESULTS DOCUMENT: HCPCS | Performed by: NURSE PRACTITIONER

## 2022-09-08 PROCEDURE — 3017F COLORECTAL CA SCREEN DOC REV: CPT | Performed by: NURSE PRACTITIONER

## 2022-09-08 NOTE — TELEPHONE ENCOUNTER
Seen patient in office today. She has home care for nursing, PT/OT. She does have a picc line and receiving the IV antibiotic. She was not sure which home care was providing these services. Those would have been ordered and managed by LINDA Molina.

## 2022-09-08 NOTE — PROGRESS NOTES
Post-Discharge Transitional Care Follow Up      Anjali Darby   YOB: 1945    Date of Office Visit:  9/8/2022  Date of Hospital Admission: 7/18/22  Date of Hospital Discharge: 8/16/22  Readmission Risk Score (high >=14%. Medium >=10%):Readmission Risk Score: 18.8      Care management risk score Rising risk (score 2-5) and Complex Care (Scores >=6): No Risk Score On File     Non face to face  following discharge, date last encounter closed (first attempt may have been earlier): *No documented post hospital discharge outreach found in the last 14 days     Call initiated 2 business days of discharge: *No response recorded in the last 14 days     Hospital discharge follow-up  -     WI DISCHARGE MEDS RECONCILED W/ CURRENT OUTPATIENT MED LIST  Continue home health- nursing, PT/OT    Necrotizing pneumonia ([facility] [address] [address]  On IV meropenem and INH tobramycin   Feels that she is improving. Continue f/u with ID Dr. Germain Alvarez- appt 9/21  Dr. Behzad Paredes office has be monitoring labs. COPD, severe ([address]  Continue inhalers  Continue f/u with pulmonologist Dr. Butts Brought     Acute on chronic respiratory failure with hypoxia ([facility] [address] [address]  On O2 2.5 L continuous  Continue f/u with pulmonologist      Coronary artery disease involving native coronary artery of native heart without angina pectoris  S/p PCI   Continue current medications  Continue f/u with cardiologist  [facility]     Need for influenza vaccination  -     Influenza, FLUAD, (age 72 y+), IM, PF, 0.5 mL    Return in about 4 weeks (around 10/6/2022), or if symptoms worsen or fail to improve, for AWV. Subjective:   HPI  Chief Complaint   Patient presents with    Follow-Up from [facility] to er 7/3/22 was admitted. Discharged 7/13/22. Went back to er 7/18/2022 admitted. Discharged 8/16/2022 to rehab. Discharged from rehab 9/7/2022. For pneumonia and had mild heart attack. Stents were placed. Feels like she is doing much better.       Acute on chronic respiratory failure  On 2.5 L    Severe COPD  On inhalers   Follows with pulmonologist Dr. Brian Meyer PNA with pseudomonal aerguinosa lung abscess   On IV antibiotic and inhaled tobramycin   Following with ID Dr. Matilda Santos     CAD- s/p PCI to left circumflex by Dr. Amirah Sullivan   On effient, plavix, ASA, statin   Denies chest pain     Weight is down from baseline. Reports that she was not eating well, but has improved since being home. Has home health care     Inpatient course: Discharge summary reviewed- see chart.     Patient Active Problem List   Diagnosis    Osteopenia-last dexa 2009 repeat 2015 (-2.4 hip)--advised tx    Colon polyp, hyperplastic,-(done 3/11-repeat 3-5 yrs--dr Abdelrahman Al)    Family history of colon cancer    CTS (carpal tunnel syndrome)BILATERAL-s/p surgical repair--resolved     Postmenopausal status-last mammogram 2/15 wnl last pap 12/13--wnl    Nondependent alcohol abuse, in remission    Urticaria, chronic    Smoking    Chronic back pain-(djd) saw dr Maureen Elmore afford surgery--seeing dr Laura Pena for pain meds    Fibromyalgia    Hypercholesteremia    Lumbar spondylosis    Vitamin D deficiency--severe--started 50,000 iu 2x/ wk 11/13    Insomnia--on elevil w help    Constipation--on daily miralax    Depression    Chronic pain syndrome    Muscle cramping    Acute on chronic respiratory failure with hypercapnia ([address]    ROLY (acute kidney injury) ([address]    Severe sepsis (HCC)    Gastroesophageal reflux disease    COPD, severe ([address]    Chronic hypoxemic respiratory failure ([address]    Degeneration of lumbar or lumbosacral intervertebral disc    Trochanteric bursitis of right hip    COPD exacerbation ([address]    Diabetes ([address]    Controlled type 2 diabetes mellitus without complication, without long-term current use of insulin ([address]    Age-related osteoporosis without current pathological fracture- started alendronate 9/2021    Pulmonary nodule seen on imaging study    Pneumonia of right lung due to infusion  Commonly known as: MERREM  Infuse 1,000 mg intravenously in the morning and 1,000 mg at noon and 1,000 mg in the evening. Do all this for 21 days. Compound per protocol. .     ONE TOUCH LANCETS Misc  1 each by Does not apply route daily     pantoprazole 40 MG tablet  Commonly known as: Protonix  Take 1 tablet by mouth daily     prasugrel 10 MG Tabs  Commonly known as: EFFIENT  Take 1 tablet by mouth in the morning. tiZANidine 4 MG tablet  Commonly known as: Zanaflex  Take 1 tablet by mouth nightly     tobramycin (PF) 300 MG/5ML nebulizer solution  Commonly known as: LUCILLE  Take 5 mLs by nebulization in the morning and 5 mLs in the evening. Do all this for 21 days. traMADol 50 MG tablet  Commonly known as: ULTRAM     traZODone 50 MG tablet  Commonly known as: DESYREL  Take 1-2 tablets by mouth nightly     Trelegy Ellipta 200-62.5-25 MCG/INH Aepb  Generic drug: Fluticasone-Umeclidin-Vilant  Inhale 1 Inhaler into the lungs daily     vitamin D 50 MCG (2000 UT) Tabs tablet  Commonly known as: CHOLECALCIFEROL  Take 1 tablet by mouth daily            STOP taking these medications      benzocaine-menthol 15-3.6 MG lozenge  Commonly known as: CEPACOL SORE THROAT  Stopped by: HECTOR Abdul CNP     nystatin 357209 UNIT/ML suspension  Commonly known as: MYCOSTATIN  Stopped by: HECTOR Abdul CNP               Medications marked \"taking\" at this time  Outpatient Medications Marked as Taking for the 9/8/22 encounter (Office Visit) with HECTOR Durham CNP   Medication Sig Dispense Refill    meropenem (MERREM) infusion Infuse 1,000 mg intravenously in the morning and 1,000 mg at noon and 1,000 mg in the evening. Do all this for 21 days. Compound per protocol. . 1 each 0    tobramycin, PF, (LUCILLE) 300 MG/5ML nebulizer solution Take 5 mLs by nebulization in the morning and 5 mLs in the evening. Do all this for 21 days.  210 mL 0    traMADol (ULTRAM) 50 MG tablet Take 50 mg by mouth every 6 hours as needed for Pain. alendronate (FOSAMAX) 70 MG tablet TAKE 1 TABLET BY MOUTH ONE TIME PER WEEK 12 tablet 1    aspirin 81 MG EC tablet Take 1 tablet by mouth in the morning. 30 tablet 3    prasugrel (EFFIENT) 10 MG TABS Take 1 tablet by mouth in the morning. 30 tablet 1    DULoxetine (CYMBALTA) 60 MG extended release capsule TAKE 1 CAPSULE BY MOUTH TWICE A DAY 60 capsule 2    pantoprazole (PROTONIX) 40 MG tablet Take 1 tablet by mouth daily 30 tablet 1    tiZANidine (ZANAFLEX) 4 MG tablet Take 1 tablet by mouth nightly 30 tablet 1    traZODone (DESYREL) 50 MG tablet Take 1-2 tablets by mouth nightly 60 tablet 1    Fluticasone-Umeclidin-Vilant (TRELEGY ELLIPTA) 200-62.5-25 MCG/INH AEPB Inhale 1 Inhaler into the lungs daily 1 each 5    albuterol sulfate  (90 Base) MCG/ACT inhaler Inhale 2 puffs into the lungs every 6 hours as needed for Shortness of Breath 1 each 11    Calcium Citrate-Vitamin D 500-500 MG-UNIT CHEW Take 1 tablet by mouth 2 times daily      ONE TOUCH LANCETS MISC 1 each by Does not apply route daily 100 each 3    ipratropium-albuterol (DUONEB) 0.5-2.5 (3) MG/3ML SOLN nebulizer solution Take 1 aerosol every 4 hours for 2 days and then as needed for shortness of breath coughing and wheezing 360 mL 3    Cholecalciferol (VITAMIN D) 2000 units TABS tablet Take 1 tablet by mouth daily 30 tablet     cetirizine (ZYRTEC) 10 MG tablet Take 10 mg by mouth daily as needed for Allergies      Nebulizers (COMPRESSOR/NEBULIZER) MISC 1 Units by Does not apply route 4 times daily 1 each 3        Medications patient taking as of now reconciled against medications ordered at time of hospital discharge: Yes    Review of Systems   Constitutional:  Positive for activity change and fatigue. Negative for fever. Respiratory:  Positive for cough and shortness of breath. Chronic    Cardiovascular:  Negative for chest pain. Gastrointestinal:  Negative for abdominal pain.

## 2022-09-09 ENCOUNTER — TELEPHONE (OUTPATIENT)
Dept: PULMONOLOGY | Age: 77
End: 2022-09-09

## 2022-09-09 DIAGNOSIS — F33.1 MODERATE EPISODE OF RECURRENT MAJOR DEPRESSIVE DISORDER (HCC): ICD-10-CM

## 2022-09-09 DIAGNOSIS — G89.4 CHRONIC PAIN SYNDROME: ICD-10-CM

## 2022-09-09 RX ORDER — DULOXETIN HYDROCHLORIDE 60 MG/1
CAPSULE, DELAYED RELEASE ORAL
Qty: 180 CAPSULE | Refills: 0 | Status: SHIPPED | OUTPATIENT
Start: 2022-09-09

## 2022-09-09 NOTE — TELEPHONE ENCOUNTER
Dr Herminia Monk called this medication in to [facility] [place] on 8/31/22    Called [facility] and pharmacist said it is in process of being shipped from specialty office

## 2022-09-10 ASSESSMENT — ENCOUNTER SYMPTOMS
SHORTNESS OF BREATH: 1
ABDOMINAL PAIN: 0
COUGH: 1

## 2022-09-12 LAB
ANION GAP SERPL CALCULATED.3IONS-SCNC: 10 MMOL/L (ref 3–16)
BASOPHILS ABSOLUTE: 0 K/UL (ref 0–0.2)
BASOPHILS RELATIVE PERCENT: 0.3 %
BUN BLDV-MCNC: 9 MG/DL (ref 7–20)
CALCIUM SERPL-MCNC: 8.9 MG/DL (ref 8.3–10.6)
CHLORIDE BLD-SCNC: 101 MMOL/L (ref 99–110)
CO2: 28 MMOL/L (ref 21–32)
CREAT SERPL-MCNC: 0.5 MG/DL (ref 0.6–1.2)
EOSINOPHILS ABSOLUTE: 0.6 K/UL (ref 0–0.6)
EOSINOPHILS RELATIVE PERCENT: 9.1 %
GFR AFRICAN AMERICAN: >60
GFR NON-AFRICAN AMERICAN: >60
GLUCOSE BLD-MCNC: 89 MG/DL (ref 70–99)
HCT VFR BLD CALC: 27.1 % (ref 36–48)
HEMOGLOBIN: 8.7 G/DL (ref 12–16)
LYMPHOCYTES ABSOLUTE: 1.6 K/UL (ref 1–5.1)
LYMPHOCYTES RELATIVE PERCENT: 24 %
MCH RBC QN AUTO: 27.3 PG (ref 26–34)
MCHC RBC AUTO-ENTMCNC: 32.2 G/DL (ref 31–36)
MCV RBC AUTO: 84.7 FL (ref 80–100)
MONOCYTES ABSOLUTE: 0.4 K/UL (ref 0–1.3)
MONOCYTES RELATIVE PERCENT: 6.8 %
NEUTROPHILS ABSOLUTE: 3.9 K/UL (ref 1.7–7.7)
NEUTROPHILS RELATIVE PERCENT: 59.8 %
PDW BLD-RTO: 17.5 % (ref 12.4–15.4)
PLATELET # BLD: 685 K/UL (ref 135–450)
PMV BLD AUTO: 6.6 FL (ref 5–10.5)
POTASSIUM SERPL-SCNC: 4.3 MMOL/L (ref 3.5–5.1)
RBC # BLD: 3.2 M/UL (ref 4–5.2)
SEDIMENTATION RATE, ERYTHROCYTE: 49 MM/HR (ref 0–30)
SODIUM BLD-SCNC: 139 MMOL/L (ref 136–145)
WBC # BLD: 6.6 K/UL (ref 4–11)

## 2022-09-13 LAB
BASOPHILS ABSOLUTE: 0 /ΜL
BASOPHILS RELATIVE PERCENT: 0.3 %
BUN BLDV-MCNC: 9 MG/DL
C-REACTIVE PROTEIN: 47
CALCIUM SERPL-MCNC: 8.9 MG/DL
CHLORIDE BLD-SCNC: 101 MMOL/L
CO2: 28 MMOL/L
CREAT SERPL-MCNC: 0.5 MG/DL
EOSINOPHILS ABSOLUTE: 0.6 /ΜL
EOSINOPHILS RELATIVE PERCENT: 9.1 %
GFR CALCULATED: NORMAL
GLUCOSE BLD-MCNC: 89 MG/DL
HCT VFR BLD CALC: 27.1 % (ref 36–46)
HEMOGLOBIN: 8.7 G/DL (ref 12–16)
LYMPHOCYTES ABSOLUTE: 1.6 /ΜL
LYMPHOCYTES RELATIVE PERCENT: 24 %
MCH RBC QN AUTO: 27.3 PG
MCHC RBC AUTO-ENTMCNC: 32.2 G/DL
MCV RBC AUTO: 84.7 FL
MONOCYTES ABSOLUTE: 0.4 /ΜL
MONOCYTES RELATIVE PERCENT: 6.8 %
NEUTROPHILS ABSOLUTE: 3.9 /ΜL
NEUTROPHILS RELATIVE PERCENT: 59.8 %
PDW BLD-RTO: 17.5 %
PLATELET # BLD: 685 K/ΜL
PMV BLD AUTO: 6.6 FL
POTASSIUM SERPL-SCNC: 4.3 MMOL/L
RBC # BLD: 3.2 10^6/ΜL
SEDIMENTATION RATE, ERYTHROCYTE: 49
SODIUM BLD-SCNC: 139 MMOL/L
WBC # BLD: 6.6 10^3/ML

## 2022-09-15 RX ORDER — TOBRAMYCIN INHALATION SOLUTION 300 MG/5ML
300 INHALANT RESPIRATORY (INHALATION) 2 TIMES DAILY
Qty: 60 ML | Refills: 0 | Status: SHIPPED | OUTPATIENT
Start: 2022-09-15 | End: 2022-09-21

## 2022-09-19 LAB
ANION GAP SERPL CALCULATED.3IONS-SCNC: 11 MMOL/L (ref 3–16)
BASOPHILS ABSOLUTE: 0 /ΜL
BASOPHILS ABSOLUTE: 0 K/UL (ref 0–0.2)
BASOPHILS RELATIVE PERCENT: 0.6 %
BASOPHILS RELATIVE PERCENT: 0.6 %
BUN BLDV-MCNC: 10 MG/DL
BUN BLDV-MCNC: 10 MG/DL (ref 7–20)
C-REACTIVE PROTEIN: 19.9
CALCIUM SERPL-MCNC: 9.2 MG/DL
CALCIUM SERPL-MCNC: 9.2 MG/DL (ref 8.3–10.6)
CHLORIDE BLD-SCNC: 103 MMOL/L
CHLORIDE BLD-SCNC: 103 MMOL/L (ref 99–110)
CO2: 29 MMOL/L
CO2: 29 MMOL/L (ref 21–32)
CREAT SERPL-MCNC: 0.6 MG/DL
CREAT SERPL-MCNC: 0.6 MG/DL (ref 0.6–1.2)
EOSINOPHILS ABSOLUTE: 0.4 /ΜL
EOSINOPHILS ABSOLUTE: 0.4 K/UL (ref 0–0.6)
EOSINOPHILS RELATIVE PERCENT: 5.5 %
EOSINOPHILS RELATIVE PERCENT: 5.5 %
GFR AFRICAN AMERICAN: >60
GFR CALCULATED: NORMAL
GFR NON-AFRICAN AMERICAN: >60
GLUCOSE BLD-MCNC: 129 MG/DL
GLUCOSE BLD-MCNC: 129 MG/DL (ref 70–99)
HCT VFR BLD CALC: 28 % (ref 36–46)
HCT VFR BLD CALC: 28 % (ref 36–48)
HEMOGLOBIN: 9.1 G/DL (ref 12–16)
HEMOGLOBIN: 9.1 G/DL (ref 12–16)
LYMPHOCYTES ABSOLUTE: 1.5 /ΜL
LYMPHOCYTES ABSOLUTE: 1.5 K/UL (ref 1–5.1)
LYMPHOCYTES RELATIVE PERCENT: 21.9 %
LYMPHOCYTES RELATIVE PERCENT: 21.9 %
MCH RBC QN AUTO: 27.5 PG
MCH RBC QN AUTO: 27.5 PG (ref 26–34)
MCHC RBC AUTO-ENTMCNC: 32.5 G/DL
MCHC RBC AUTO-ENTMCNC: 32.5 G/DL (ref 31–36)
MCV RBC AUTO: 84.8 FL
MCV RBC AUTO: 84.8 FL (ref 80–100)
MONOCYTES ABSOLUTE: 0.3 /ΜL
MONOCYTES ABSOLUTE: 0.3 K/UL (ref 0–1.3)
MONOCYTES RELATIVE PERCENT: 4.1 %
MONOCYTES RELATIVE PERCENT: 4.1 %
NEUTROPHILS ABSOLUTE: 4.7 /ΜL
NEUTROPHILS ABSOLUTE: 4.7 K/UL (ref 1.7–7.7)
NEUTROPHILS RELATIVE PERCENT: 67.9 %
NEUTROPHILS RELATIVE PERCENT: 67.9 %
PDW BLD-RTO: 17.5 %
PDW BLD-RTO: 17.5 % (ref 12.4–15.4)
PLATELET # BLD: 677 K/UL (ref 135–450)
PLATELET # BLD: 677 K/ΜL
PMV BLD AUTO: 6.6 FL
PMV BLD AUTO: 6.6 FL (ref 5–10.5)
POTASSIUM SERPL-SCNC: 3.7 MMOL/L
POTASSIUM SERPL-SCNC: 3.7 MMOL/L (ref 3.5–5.1)
RBC # BLD: 3.31 10^6/ΜL
RBC # BLD: 3.31 M/UL (ref 4–5.2)
SEDIMENTATION RATE, ERYTHROCYTE: 48
SEDIMENTATION RATE, ERYTHROCYTE: 48 MM/HR (ref 0–30)
SODIUM BLD-SCNC: 143 MMOL/L
SODIUM BLD-SCNC: 143 MMOL/L (ref 136–145)
WBC # BLD: 6.9 10^3/ML
WBC # BLD: 6.9 K/UL (ref 4–11)

## 2022-09-20 ENCOUNTER — OFFICE VISIT (OUTPATIENT)
Dept: PAIN MANAGEMENT | Age: 77
End: 2022-09-20
Payer: MEDICARE

## 2022-09-20 VITALS
WEIGHT: 120.6 LBS | OXYGEN SATURATION: 100 % | SYSTOLIC BLOOD PRESSURE: 119 MMHG | BODY MASS INDEX: 20.7 KG/M2 | HEART RATE: 92 BPM | DIASTOLIC BLOOD PRESSURE: 72 MMHG

## 2022-09-20 DIAGNOSIS — M51.37 DEGENERATION OF LUMBAR OR LUMBOSACRAL INTERVERTEBRAL DISC: ICD-10-CM

## 2022-09-20 DIAGNOSIS — M47.26 OSTEOARTHRITIS OF SPINE WITH RADICULOPATHY, LUMBAR REGION: ICD-10-CM

## 2022-09-20 DIAGNOSIS — M51.36 DDD (DEGENERATIVE DISC DISEASE), LUMBAR: ICD-10-CM

## 2022-09-20 DIAGNOSIS — K21.9 GASTROESOPHAGEAL REFLUX DISEASE WITHOUT ESOPHAGITIS: ICD-10-CM

## 2022-09-20 DIAGNOSIS — F51.01 PRIMARY INSOMNIA: ICD-10-CM

## 2022-09-20 DIAGNOSIS — G89.4 CHRONIC PAIN SYNDROME: ICD-10-CM

## 2022-09-20 DIAGNOSIS — M79.7 FIBROMYALGIA: ICD-10-CM

## 2022-09-20 DIAGNOSIS — M70.61 TROCHANTERIC BURSITIS OF RIGHT HIP: ICD-10-CM

## 2022-09-20 DIAGNOSIS — M47.816 LUMBAR SPONDYLOSIS: ICD-10-CM

## 2022-09-20 DIAGNOSIS — F33.1 MODERATE EPISODE OF RECURRENT MAJOR DEPRESSIVE DISORDER (HCC): ICD-10-CM

## 2022-09-20 PROCEDURE — 1036F TOBACCO NON-USER: CPT | Performed by: INTERNAL MEDICINE

## 2022-09-20 PROCEDURE — 1123F ACP DISCUSS/DSCN MKR DOCD: CPT | Performed by: INTERNAL MEDICINE

## 2022-09-20 PROCEDURE — G8399 PT W/DXA RESULTS DOCUMENT: HCPCS | Performed by: INTERNAL MEDICINE

## 2022-09-20 PROCEDURE — G8427 DOCREV CUR MEDS BY ELIG CLIN: HCPCS | Performed by: INTERNAL MEDICINE

## 2022-09-20 PROCEDURE — 99214 OFFICE O/P EST MOD 30 MIN: CPT | Performed by: INTERNAL MEDICINE

## 2022-09-20 PROCEDURE — 3017F COLORECTAL CA SCREEN DOC REV: CPT | Performed by: INTERNAL MEDICINE

## 2022-09-20 PROCEDURE — 1090F PRES/ABSN URINE INCON ASSESS: CPT | Performed by: INTERNAL MEDICINE

## 2022-09-20 PROCEDURE — G8420 CALC BMI NORM PARAMETERS: HCPCS | Performed by: INTERNAL MEDICINE

## 2022-09-20 RX ORDER — TRAZODONE HYDROCHLORIDE 50 MG/1
50-100 TABLET ORAL NIGHTLY
Qty: 60 TABLET | Refills: 1 | Status: SHIPPED | OUTPATIENT
Start: 2022-09-20 | End: 2022-10-18 | Stop reason: SDUPTHER

## 2022-09-20 RX ORDER — PANTOPRAZOLE SODIUM 40 MG/1
40 TABLET, DELAYED RELEASE ORAL DAILY
Qty: 30 TABLET | Refills: 1 | Status: SHIPPED | OUTPATIENT
Start: 2022-09-20 | End: 2022-10-18 | Stop reason: SDUPTHER

## 2022-09-20 RX ORDER — GABAPENTIN 600 MG/1
600 TABLET ORAL 2 TIMES DAILY
Qty: 60 TABLET | Refills: 0 | Status: SHIPPED | OUTPATIENT
Start: 2022-09-20 | End: 2022-10-18 | Stop reason: SDUPTHER

## 2022-09-20 RX ORDER — OXYCODONE HYDROCHLORIDE AND ACETAMINOPHEN 5; 325 MG/1; MG/1
1 TABLET ORAL EVERY 6 HOURS PRN
Qty: 112 TABLET | Refills: 0 | Status: SHIPPED | OUTPATIENT
Start: 2022-09-20 | End: 2022-10-18 | Stop reason: SDUPTHER

## 2022-09-21 ENCOUNTER — TELEPHONE (OUTPATIENT)
Dept: INFECTIOUS DISEASES | Age: 77
End: 2022-09-21

## 2022-09-21 ENCOUNTER — OFFICE VISIT (OUTPATIENT)
Dept: INFECTIOUS DISEASES | Age: 77
End: 2022-09-21
Payer: MEDICARE

## 2022-09-21 ENCOUNTER — TELEPHONE (OUTPATIENT)
Dept: PULMONOLOGY | Age: 77
End: 2022-09-21

## 2022-09-21 VITALS
HEART RATE: 87 BPM | DIASTOLIC BLOOD PRESSURE: 70 MMHG | WEIGHT: 120 LBS | TEMPERATURE: 97.9 F | OXYGEN SATURATION: 100 % | HEIGHT: 64 IN | BODY MASS INDEX: 20.49 KG/M2 | SYSTOLIC BLOOD PRESSURE: 126 MMHG

## 2022-09-21 DIAGNOSIS — J85.0 NECROTIZING PNEUMONIA (HCC): ICD-10-CM

## 2022-09-21 DIAGNOSIS — Z79.2 RECEIVING INTRAVENOUS ANTIBIOTIC TREATMENT AS OUTPATIENT: ICD-10-CM

## 2022-09-21 DIAGNOSIS — J43.1 PANLOBULAR EMPHYSEMA (HCC): ICD-10-CM

## 2022-09-21 DIAGNOSIS — R93.89 ABNORMAL CT OF THE CHEST: ICD-10-CM

## 2022-09-21 DIAGNOSIS — R04.0 EPISTAXIS: ICD-10-CM

## 2022-09-21 DIAGNOSIS — J98.4 CAVITARY PNEUMONIA: ICD-10-CM

## 2022-09-21 DIAGNOSIS — B96.5 PSEUDOMONAS RESPIRATORY INFECTION: ICD-10-CM

## 2022-09-21 DIAGNOSIS — J15.1 PNEUMONIA OF RIGHT UPPER LOBE DUE TO PSEUDOMONAS SPECIES (HCC): Primary | ICD-10-CM

## 2022-09-21 DIAGNOSIS — J98.8 PSEUDOMONAS RESPIRATORY INFECTION: ICD-10-CM

## 2022-09-21 DIAGNOSIS — J18.9 CAVITARY PNEUMONIA: ICD-10-CM

## 2022-09-21 DIAGNOSIS — J18.9 PNEUMONIA OF RIGHT UPPER LOBE DUE TO INFECTIOUS ORGANISM: ICD-10-CM

## 2022-09-21 DIAGNOSIS — R06.89 ACUTE RESPIRATORY INSUFFICIENCY: ICD-10-CM

## 2022-09-21 PROCEDURE — 99215 OFFICE O/P EST HI 40 MIN: CPT | Performed by: INTERNAL MEDICINE

## 2022-09-21 PROCEDURE — 1036F TOBACCO NON-USER: CPT | Performed by: INTERNAL MEDICINE

## 2022-09-21 PROCEDURE — 1123F ACP DISCUSS/DSCN MKR DOCD: CPT | Performed by: INTERNAL MEDICINE

## 2022-09-21 PROCEDURE — G8399 PT W/DXA RESULTS DOCUMENT: HCPCS | Performed by: INTERNAL MEDICINE

## 2022-09-21 PROCEDURE — 3017F COLORECTAL CA SCREEN DOC REV: CPT | Performed by: INTERNAL MEDICINE

## 2022-09-21 PROCEDURE — 3023F SPIROM DOC REV: CPT | Performed by: INTERNAL MEDICINE

## 2022-09-21 PROCEDURE — 1090F PRES/ABSN URINE INCON ASSESS: CPT | Performed by: INTERNAL MEDICINE

## 2022-09-21 PROCEDURE — G8420 CALC BMI NORM PARAMETERS: HCPCS | Performed by: INTERNAL MEDICINE

## 2022-09-21 PROCEDURE — G8427 DOCREV CUR MEDS BY ELIG CLIN: HCPCS | Performed by: INTERNAL MEDICINE

## 2022-09-21 NOTE — TELEPHONE ENCOUNTER
----- Message from Travis Orourke MD sent at 9/21/2022  2:43 PM EDT -----  Please schedule follow up first available.    ----- Message -----  From: Antonieta Rizzo MD  Sent: 8/16/2022   5:11 PM EDT  To: Travis Orourke MD

## 2022-09-21 NOTE — PROGRESS NOTES
Infectious Diseases Outpatient Follow-up Note    Primary Care Physician:  Veronica Bobby, APRN - CNP       History Obtained From:   Patient, EPIC    CHIEF COMPLAINT / ID problem:    Chief Complaint   Patient presents with    Follow-up     Pneumonia of right upper lobe due to infectious organism        HISTORY OF PRESENT ILLNESS / Interval history:  Here for follow up on Necrotizing PNA from Pseudomonas and resp failure she had a very complicated course following severe PNA from Pseudomonas she was in ICU prolonged stay was eventually d/c to NH on IV Meropenem and Inhaled Tobramycin and she was at [facility] from 7/18/22 to 8/16/22 and she was in rehab now dc to [facility]- She is on 2 lts nasal cannula and she is having epistaxis some times and she had to see ENT as  in patient. Cough + sob + sputum improving and PICC working well she is in wheel chair in the clinic today. Past Medical History:    Past Medical History:   Diagnosis Date    CAD (coronary artery disease) 07/19/2022    NSTEMI, PCI LCx    Chronic pain syndrome     Percocet. Dr. Cornelius Way    Colorectal polyps     COPD (chronic obstructive pulmonary disease) ([address]     CTS (carpal tunnel syndrome)     BILATERAL    DDD (degenerative disc disease), lumbar     Depression     Family hx of colon cancer     Fibromyalgia     Hyperlipemia     IBS (irritable bowel syndrome)     Lumbar spondylosis     New onset type 2 diabetes mellitus ([address] 02/03/2020    On home O2     4L    Pneumonia 07/2022    P.aer    Urticaria, chronic        Past Surgical History:    Past Surgical History:   Procedure Laterality Date    BRONCHOSCOPY N/A 08/03/2022    BRONCHOSCOPY performed by Fartun Parada DO at [facility]  07/21/2022    with pci    CARPAL TUNNEL RELEASE Bilateral     CATARACT EXTRACTION      CERVICAL POLYP REMOVAL  09/2004    CORONARY ANGIOPLASTY WITH STENT PLACEMENT  07/19/2022    LCx 99. PCI/stent.     DIAGNOSTIC CARDIAC CATH LAB PROCEDURE Left 07/19/2022    INTRACAPSULAR CATARACT EXTRACTION Left 06/23/2020    Phacoemulsification with intraocular lens implant performed by Ksenia Calzada MD at [address] Right 07/14/2020    Phacoemulsification with intraocular lens implant performed by Ksenia Calzada MD at [address]      patient denies        Current Medications:    Current Outpatient Medications   Medication Sig Dispense Refill    pantoprazole (PROTONIX) 40 MG tablet Take 1 tablet by mouth daily 30 tablet 1    traZODone (DESYREL) 50 MG tablet Take 1-2 tablets by mouth nightly 60 tablet 1    gabapentin (NEURONTIN) 600 MG tablet Take 1 tablet by mouth 2 times daily for 30 days. 60 tablet 0    oxyCODONE-acetaminophen (PERCOCET) 5-325 MG per tablet Take 1 tablet by mouth every 6 hours as needed for Pain (Max 4 per day) for up to 28 days. 112 tablet 0    DULoxetine (CYMBALTA) 60 MG extended release capsule TAKE 1 CAPSULE BY MOUTH TWICE A  capsule 0    alendronate (FOSAMAX) 70 MG tablet TAKE 1 TABLET BY MOUTH ONE TIME PER WEEK 12 tablet 1    aspirin 81 MG EC tablet Take 1 tablet by mouth in the morning. 30 tablet 3    prasugrel (EFFIENT) 10 MG TABS Take 1 tablet by mouth in the morning.  30 tablet 1    Fluticasone-Umeclidin-Vilant (TRELEGY ELLIPTA) 200-62.5-25 MCG/INH AEPB Inhale 1 Inhaler into the lungs daily 1 each 5    albuterol sulfate  (90 Base) MCG/ACT inhaler Inhale 2 puffs into the lungs every 6 hours as needed for Shortness of Breath 1 each 11    ipratropium-albuterol (DUONEB) 0.5-2.5 (3) MG/3ML SOLN nebulizer solution Take 1 aerosol every 4 hours for 2 days and then as needed for shortness of breath coughing and wheezing 360 mL 3    Cholecalciferol (VITAMIN D) 2000 units TABS tablet Take 1 tablet by mouth daily 30 tablet     cetirizine (ZYRTEC) 10 MG tablet Take 10 mg by mouth daily as needed for Allergies Nebulizers (COMPRESSOR/NEBULIZER) MISC 1 Units by Does not apply route 4 times daily 1 each 3    Calcium Citrate-Vitamin D 500-500 MG-UNIT CHEW Take 1 tablet by mouth 2 times daily (Patient not taking: No sig reported)      ONE TOUCH LANCETS MISC 1 each by Does not apply route daily 100 each 3     No current facility-administered medications for this visit. Allergies:  Patient has no known allergies.       Immunizations :   Immunization History   Administered Date(s) Administered    COVID-19, MODERNA SUDHA border, Primary or Immunocompromised, (age 12y+), IM, 100 mcg/0.5mL 2021, 2021    COVID-19, MODERNA Booster SUDHA border, (age 18y+), IM, 50mcg/0.25mL 2021    Influenza Vaccine, unspecified formulation 01/10/2017    Influenza, FLUAD, (age 72 y+), Adjuvanted, 0.5mL 2021, 2022    Influenza, High Dose (Fluzone 65 yrs and older) 2013, 2016, 2017, 10/30/2018, 2020    Influenza, Triv, inactivated, subunit, adjuvanted, IM (Fluad 65 yrs and older) 2019    Pneumococcal Conjugate 13-valent ([id number]) 2015    Pneumococcal Conjugate Vaccine 01/10/2017    Pneumococcal Polysaccharide ([id number]) 10/12/2012    Tdap (Boostrix, Adacel) 2007    Zoster Recombinant (Shingrix) 2019           Social History:    Social History     Socioeconomic History    Marital status: Single    Number of children: 4   Occupational History    Occupation: retired---[employer]   Tobacco Use    Smoking status: Former     Packs/day: 1.00     Years: 55.00     Pack years: 55.00     Types: Cigarettes     Start date: 1962     Quit date: 7/3/2022     Years since quittin.2    Smokeless tobacco: Never    Tobacco comments:     Quit in July   Vaping Use    Vaping Use: Never used   Substance and Sexual Activity    Alcohol use: No     Alcohol/week: 0.0 standard drinks    Drug use: No    Sexual activity: Yes     Partners: Male     Social History     Tobacco Use   Smoking Status Former    Packs/day: 1.00    Years: 55.00    Pack years: 55.00    Types: Cigarettes    Start date: 1962    Quit date: 7/3/2022    Years since quittin.2   Smokeless Tobacco Never   Tobacco Comments    Quit in July      Family History   Problem Relation Age of Onset    Cancer Father         COLON     Alzheimer's Disease Mother     Heart Disease Brother     Heart Failure Brother     Diabetes Daughter     Diabetes Daughter     Diabetes Daughter          REVIEW OF SYSTEMS:       CONSTITUTIONAL:  negative for fevers, chills, diaphoresis, activity change, appetite change, fatigue, night sweats and unexpected weight change. EYES:  negative for blurred vision, eye discharge, visual disturbance and icterus  HEENT:  negative for hearing loss, tinnitus, ear drainage, sinus pressure, nasal congestion, epistaxis and snoring  RESPIRATORY:  ++ cough , ++ sob, ++sputum production , no wheeze ,no hemoptysis   CARDIOVASCULAR:  negative for chest pain, palpitations, exertional chest pressure/discomfort, edema, syncope  GASTROINTESTINAL:  negative for nausea, vomiting, diarrhea, constipation, blood in stool and abdominal pain  GENITOURINARY:  negative for frequency, dysuria, urinary incontinence, decreased urine volume, and hematuria  HEMATOLOGIC/LYMPHATIC:  negative for easy bruising, bleeding and lymphadenopathy  ALLERGIC/IMMUNOLOGIC:  negative for recurrent infections, angioedema, anaphylaxis and drug reactions  ENDOCRINE:  negative for weight changes and diabetic symptoms including polyuria, polydipsia and polyphagia  MUSCULOSKELETAL:  negative for  pain, joint swelling, decreased range of motion and muscle weakness  INTEGUMENTARY: negative for skin color change, rashes, blisters  NEUROLOGICAL:  negative for headaches, slurred speech, unilateral weakness  PSYCHIATRIC/BEHAVIORAL: negative for hallucinations, behavioral problems, confusion and agitation.      PHYSICAL EXAM:    Vitals:    Vitals:    22 1054   BP: 126/70   Pulse: 87   Temp: 97.9 °F (36.6 °C)   SpO2: 100%     General Appearance: alert,in some acute distress, ++  pallor, no icterus   Skin: warm and dry, no rash or erythema  Head: normocephalic and atraumatic  Eyes: pupils equal, round, and reactive to light, extraocular eye movements intact, conjunctivae normal  ENT: tympanic membrane, external ear and ear canal normal bilaterally, nose without deformity, nasal mucosa and turbinates normal without polyps  Neck: supple and non-tender without mass, no thyromegaly  no cervical lymphadenopathy  Pulmonary/Chest: Bi basal crepts++  no wheezes, rales or rhonchi, normal air movement,  Cardiovascular: normal rate, regular rhythm, normal S1 and S2, no murmurs, rubs, clicks, or gallops,   Abdomen: soft, non-tender, non-distended, normal bowel sounds, no masses or organomegaly  Extremities: no cyanosis, clubbing or edema  Musculoskeletal: normal range of motion, no joint swelling   Integumentary: no petechiae, no purpura, no blisters  Neurologic: reflexes normal and symmetric, no cranial nerve deficit, speech normal   Psych:  Orientation, sensorium, mood normal  PICC        DATA:    CBC:   Lab Results   Component Value Date    WBC 2.2 (L) 10/03/2022    HGB 9.1 (L) 10/03/2022    HCT 27.6 (L) 10/03/2022    MCV 83.4 10/03/2022     10/03/2022     RENAL:   Lab Results   Component Value Date    CREATININE 0.7 10/03/2022    BUN 12 10/03/2022     10/03/2022    K 4.3 10/03/2022     10/03/2022    CO2 28 10/03/2022     SED RATE:   Lab Results   Component Value Date/Time    SEDRATE 34 10/03/2022 12:30 PM     CK: No results found for: CKTOTAL  CRP:   Lab Results   Component Value Date/Time    CRP 15.5 10/03/2022 12:30 PM     Hepatic Function Panel:   Lab Results   Component Value Date/Time    ALKPHOS 72 08/31/2022 03:54 AM    ALT 11 08/31/2022 03:54 AM    AST 9 08/31/2022 03:54 AM    PROT 5.6 07/18/2022 05:20 PM    PROT 6.9 10/12/2012 11:00 AM    BILITOT 0.3 08/31/2022 03:54 AM    LABALBU 2.7 08/31/2022 03:54 AM     UA:  Lab Results   Component Value Date/Time    COLORU Yellow 07/22/2022 06:31 AM    CLARITYU Clear 07/22/2022 06:31 AM    GLUCOSEU Negative 07/22/2022 06:31 AM    BILIRUBINUR Negative 07/22/2022 06:31 AM    BILIRUBINUR neg 10/17/2019 11:50 AM    KETUA TRACE 07/22/2022 06:31 AM    SPECGRAV 1.049 07/22/2022 06:31 AM    BLOODU Negative 07/22/2022 06:31 AM    PHUR 6.5 07/22/2022 06:31 AM    PROTEINU 30 07/22/2022 06:31 AM    UROBILINOGEN 0.2 07/22/2022 06:31 AM    NITRU Negative 07/22/2022 06:31 AM    LEUKOCYTESUR Negative 07/22/2022 06:31 AM    LABMICR YES 07/22/2022 06:31 AM    URINETYPE NotGiven 07/22/2022 06:31 AM      Urine Microscopic:   Lab Results   Component Value Date/Time    LABCAST 10-20 Hyaline 01/10/2017 06:19 PM    BACTERIA None Seen 07/22/2022 06:31 AM    COMU see below 07/03/2022 03:11 PM    HYALCAST 2 07/22/2022 06:31 AM    WBCUA 2 07/22/2022 06:31 AM    RBCUA 5 07/22/2022 06:31 AM    EPIU 1 07/22/2022 06:31 AM     Urine Reflex to Culture:   Lab Results   Component Value Date/Time    URRFLXCULT Not Indicated 07/03/2022 03:11 PM       MICRO: cultures reviewed and updated by me     Respiratory Culture:  Lab Results   Component Value Date/Time    CULTRESP Normal respiratory fabiana 07/30/2022 04:00 PM    LABGRAM  07/30/2022 04:00 PM     3+ WBC's (Mononuclear)  1+ Epithelial Cells  1+ Gram positive cocci       AFB:No results found for: AFBSMEAR    Urine Culture: No results for input(s): LABURIN in the last 72 hours.   Component 7/22/22 1200    CULTURE, RESPIRATORY Rare growth normal respiratory fabiana with Abnormal     Gram Stain Result No Epithelial Cells seen   3+ WBC's (Polymorphonuclear)   No organisms seen    Organism Pseudomonas aeruginosa Abnormal     CULTURE, RESPIRATORY Light growth    Resulting Agency [address] [facility]        Susceptibility    Pseudomonas aeruginosa (1)    Antibiotic Interpretation Microscan  Method Status    cefepime Sensitive 8 mcg/mL BACTERIAL SUSCEPTIBILITY PANEL BY TIERRA     ciprofloxacin Resistant >2 mcg/mL BACTERIAL SUSCEPTIBILITY PANEL BY TIERRA     gentamicin Sensitive <=4 mcg/mL BACTERIAL SUSCEPTIBILITY PANEL BY TIERRA     meropenem Sensitive <=1 mcg/mL BACTERIAL SUSCEPTIBILITY PANEL BY TIERRA     piperacillin-tazobactam Sensitive <=16 mcg/mL BACTERIAL SUSCEPTIBILITY PANEL BY TIERRA     tobramycin Sensitive <=4 mcg/mL BACTERIAL SUSCEPTIBILITY PANEL BY TIERRA                Imaging:  No orders to display       CT chest :  8/11/22  Impression   1. Multifocal pneumonia, primary in the right lung, with areas of improvement   since 07/23/2022. Right upper lobe pulmonary abscess is persistent, mildly   decreased with small amount of fluid in the interval.   2. Few foci of airspace disease in the left lower lobe are mildly increased. 3. Persistent small right pleural effusion. 4. Stable mediastinal adenopathy, likely reactive in the setting. Labs, Microbiology and  Radiology results pertinent to this visit and from care every where  reviewed in detail by me as part of the consultation     IMPRESSION:     Diagnosis Orders   1. Pneumonia of right upper lobe due to Pseudomonas species ([address]  CT CHEST WO CONTRAST      2. Necrotizing pneumonia ([address]        3. Pneumonia of right upper lobe due to infectious organism        4. Acute respiratory insufficiency        5. Epistaxis        6. Pseudomonas respiratory infection        7. Cavitary pneumonia        8. Abnormal CT of the chest        9. Panlobular emphysema ([address]        10.  Receiving intravenous antibiotic treatment as outpatient            Severe PNA and cavitation with Lung abscess formation and Necrotizing infection was d/c to home on IV Meropenem and Inhaled Tobramycin, resp status slowly improving and cough is better no chills and sputum resolved having intermittent Epistaxis as she is on Nasal cannula she was advised to see ENT for this - she will need repeat CT chest to decide on the course of IV abx     PLAN:    Cont IV Meropenem x 1 gm q 8 HR  Cont Inhaled Tobramycin as before  Cont WEEKLY labs  Labs reviewed  CT chest non contrast   PICC in place  Pseudomonas is Quinolone resistant so no oral abx options  Follow up with ENT for Epistaxis        Total time spent for this encounter: 45 ,min   on visit (including interval history,physical exam, review of data including labs, cultures, imaging,  ordering labs, development and implementation of treatment plan, co ordination of care, counseling and education ). --Eriberto Camacho MD on 10/9/2022 at 12:01 AM    An electronic signature was used to authenticate this note. D/w Patient in detail at  the time of consultation. Thanks for allowing me to participate in your patient's care and please do not hesitate to  call me with any questions or concerns.     Charles Alvarez MD  Infectious Disease  [facility] Physician  Phone: [phone]   Fax : [phone]

## 2022-09-26 LAB
ANION GAP SERPL CALCULATED.3IONS-SCNC: 11 MMOL/L (ref 3–16)
BASOPHILS ABSOLUTE: 0 K/UL (ref 0–0.2)
BASOPHILS RELATIVE PERCENT: 1.3 %
BUN BLDV-MCNC: 10 MG/DL (ref 7–20)
CALCIUM SERPL-MCNC: 9.4 MG/DL (ref 8.3–10.6)
CHLORIDE BLD-SCNC: 101 MMOL/L (ref 99–110)
CO2: 28 MMOL/L (ref 21–32)
CREAT SERPL-MCNC: 0.6 MG/DL (ref 0.6–1.2)
EOSINOPHILS ABSOLUTE: 0.2 K/UL (ref 0–0.6)
EOSINOPHILS RELATIVE PERCENT: 7.6 %
GFR AFRICAN AMERICAN: >60
GFR NON-AFRICAN AMERICAN: >60
GLUCOSE BLD-MCNC: 118 MG/DL (ref 70–99)
HCT VFR BLD CALC: 30.2 % (ref 36–48)
HEMOGLOBIN: 9.9 G/DL (ref 12–16)
LYMPHOCYTES ABSOLUTE: 1.1 K/UL (ref 1–5.1)
LYMPHOCYTES RELATIVE PERCENT: 38.7 %
MCH RBC QN AUTO: 27.3 PG (ref 26–34)
MCHC RBC AUTO-ENTMCNC: 32.7 G/DL (ref 31–36)
MCV RBC AUTO: 83.4 FL (ref 80–100)
MONOCYTES ABSOLUTE: 0.3 K/UL (ref 0–1.3)
MONOCYTES RELATIVE PERCENT: 11.4 %
NEUTROPHILS ABSOLUTE: 1.1 K/UL (ref 1.7–7.7)
NEUTROPHILS RELATIVE PERCENT: 41 %
PDW BLD-RTO: 18.2 % (ref 12.4–15.4)
PLATELET # BLD: 506 K/UL (ref 135–450)
PMV BLD AUTO: 6.5 FL (ref 5–10.5)
POTASSIUM SERPL-SCNC: 3.9 MMOL/L (ref 3.5–5.1)
RBC # BLD: 3.62 M/UL (ref 4–5.2)
SEDIMENTATION RATE, ERYTHROCYTE: 44 MM/HR (ref 0–30)
SODIUM BLD-SCNC: 140 MMOL/L (ref 136–145)
WBC # BLD: 2.8 K/UL (ref 4–11)

## 2022-09-28 ENCOUNTER — TELEPHONE (OUTPATIENT)
Dept: INFECTIOUS DISEASES | Age: 77
End: 2022-09-28

## 2022-10-03 ENCOUNTER — TELEPHONE (OUTPATIENT)
Dept: INFECTIOUS DISEASES | Age: 77
End: 2022-10-03

## 2022-10-03 LAB
ACANTHOCYTES: ABNORMAL
ANION GAP SERPL CALCULATED.3IONS-SCNC: 9 MMOL/L (ref 3–16)
ANISOCYTOSIS: ABNORMAL
BASOPHILS ABSOLUTE: 0 K/UL (ref 0–0.2)
BASOPHILS RELATIVE PERCENT: 0 %
BUN BLDV-MCNC: 12 MG/DL (ref 7–20)
C-REACTIVE PROTEIN: 15.5 MG/L (ref 0–5.1)
CALCIUM SERPL-MCNC: 8.9 MG/DL (ref 8.3–10.6)
CHLORIDE BLD-SCNC: 102 MMOL/L (ref 99–110)
CO2: 28 MMOL/L (ref 21–32)
CREAT SERPL-MCNC: 0.7 MG/DL (ref 0.6–1.2)
EOSINOPHILS ABSOLUTE: 0.3 K/UL (ref 0–0.6)
EOSINOPHILS RELATIVE PERCENT: 15 %
GFR AFRICAN AMERICAN: >60
GFR NON-AFRICAN AMERICAN: >60
GLUCOSE BLD-MCNC: 105 MG/DL (ref 70–99)
HCT VFR BLD CALC: 27.6 % (ref 36–48)
HEMOGLOBIN: 9.1 G/DL (ref 12–16)
LYMPHOCYTES ABSOLUTE: 1.3 K/UL (ref 1–5.1)
LYMPHOCYTES RELATIVE PERCENT: 60 %
MCH RBC QN AUTO: 27.5 PG (ref 26–34)
MCHC RBC AUTO-ENTMCNC: 33 G/DL (ref 31–36)
MCV RBC AUTO: 83.4 FL (ref 80–100)
MONOCYTES ABSOLUTE: 0.3 K/UL (ref 0–1.3)
MONOCYTES RELATIVE PERCENT: 13 %
NEUTROPHILS ABSOLUTE: 0.3 K/UL (ref 1.7–7.7)
NEUTROPHILS RELATIVE PERCENT: 12 %
OVALOCYTES: ABNORMAL
PDW BLD-RTO: 18.7 % (ref 12.4–15.4)
PLATELET # BLD: 425 K/UL (ref 135–450)
PLATELET SLIDE REVIEW: ABNORMAL
PMV BLD AUTO: 6.7 FL (ref 5–10.5)
POIKILOCYTES: ABNORMAL
POTASSIUM SERPL-SCNC: 4.3 MMOL/L (ref 3.5–5.1)
RBC # BLD: 3.32 M/UL (ref 4–5.2)
SEDIMENTATION RATE, ERYTHROCYTE: 34 MM/HR (ref 0–30)
SLIDE REVIEW: ABNORMAL
SODIUM BLD-SCNC: 139 MMOL/L (ref 136–145)
WBC # BLD: 2.2 K/UL (ref 4–11)

## 2022-10-03 NOTE — TELEPHONE ENCOUNTER
Ask her to Hold IV Meropenem due to changes on CBC   Check when she is doing her CT chest if IV abx necessary I may have to change to a Different Medication that will be based on thE CT chest results

## 2022-10-04 ENCOUNTER — HOSPITAL ENCOUNTER (OUTPATIENT)
Dept: CT IMAGING | Age: 77
Discharge: HOME | End: 2022-10-04
Payer: MEDICARE

## 2022-10-04 DIAGNOSIS — J15.1 PNEUMONIA OF RIGHT UPPER LOBE DUE TO PSEUDOMONAS SPECIES (HCC): ICD-10-CM

## 2022-10-04 LAB — HEMATOLOGY PATH CONSULT: NORMAL

## 2022-10-04 PROCEDURE — 71250 CT THORAX DX C-: CPT

## 2022-10-04 NOTE — TELEPHONE ENCOUNTER
Spoke with patient and advised of MD note, she verbalizes understanding to hold and maintain line until further instructions.  Patient goes for CT today at 1:40pm. Spoke with Briseida Mccoy at [facility] and advised of MD note and she will notify [facility] as well

## 2022-10-05 NOTE — TELEPHONE ENCOUNTER
I spoke with pt and she stated she only coughs in the am and has sputum production at that time.   She is on 2.5L NC O2  She states her breathing is easier and she feels much better now than she did prior to starting IV ATB  Pt did not sound winded or SOB during telephone conversation

## 2022-10-05 NOTE — TELEPHONE ENCOUNTER
Notified patient of dc picc orders for [facility]    Contacted burton at [facility] and gave DC PICC orders

## 2022-10-05 NOTE — TELEPHONE ENCOUNTER
CT chest with ongoing changes that will likely be chronic due to damage to the Lung if her Breathing has improved and not much cough or sputum we can d/c the PICC line     if not doing better then we have to change to IV Avycaz to complete the course -   There are no oral abx to treat due to drug resistance - thx

## 2022-10-10 ENCOUNTER — OFFICE VISIT (OUTPATIENT)
Dept: PULMONOLOGY | Age: 77
End: 2022-10-10
Payer: MEDICARE

## 2022-10-10 VITALS
HEART RATE: 99 BPM | WEIGHT: 121.8 LBS | RESPIRATION RATE: 19 BRPM | TEMPERATURE: 97.1 F | OXYGEN SATURATION: 98 % | DIASTOLIC BLOOD PRESSURE: 60 MMHG | HEIGHT: 64 IN | SYSTOLIC BLOOD PRESSURE: 100 MMHG | BODY MASS INDEX: 20.79 KG/M2

## 2022-10-10 DIAGNOSIS — J98.4 CAVITARY PNEUMONIA: ICD-10-CM

## 2022-10-10 DIAGNOSIS — J18.9 CAVITARY PNEUMONIA: ICD-10-CM

## 2022-10-10 DIAGNOSIS — J96.11 CHRONIC HYPOXEMIC RESPIRATORY FAILURE (HCC): ICD-10-CM

## 2022-10-10 DIAGNOSIS — J98.4 MIXED RESTRICTIVE AND OBSTRUCTIVE LUNG DISEASE (HCC): ICD-10-CM

## 2022-10-10 DIAGNOSIS — J44.9 COPD, SEVERE (HCC): ICD-10-CM

## 2022-10-10 DIAGNOSIS — J43.9 MIXED RESTRICTIVE AND OBSTRUCTIVE LUNG DISEASE (HCC): ICD-10-CM

## 2022-10-10 PROBLEM — J44.1 COPD EXACERBATION (HCC): Status: RESOLVED | Noted: 2019-03-28 | Resolved: 2022-10-10

## 2022-10-10 PROBLEM — J85.0 NECROTIZING PNEUMONIA (HCC): Status: RESOLVED | Noted: 2022-07-26 | Resolved: 2022-10-10

## 2022-10-10 PROCEDURE — 3023F SPIROM DOC REV: CPT | Performed by: INTERNAL MEDICINE

## 2022-10-10 PROCEDURE — 3017F COLORECTAL CA SCREEN DOC REV: CPT | Performed by: INTERNAL MEDICINE

## 2022-10-10 PROCEDURE — 99214 OFFICE O/P EST MOD 30 MIN: CPT | Performed by: INTERNAL MEDICINE

## 2022-10-10 PROCEDURE — 1090F PRES/ABSN URINE INCON ASSESS: CPT | Performed by: INTERNAL MEDICINE

## 2022-10-10 PROCEDURE — 1036F TOBACCO NON-USER: CPT | Performed by: INTERNAL MEDICINE

## 2022-10-10 PROCEDURE — 1123F ACP DISCUSS/DSCN MKR DOCD: CPT | Performed by: INTERNAL MEDICINE

## 2022-10-10 PROCEDURE — G8399 PT W/DXA RESULTS DOCUMENT: HCPCS | Performed by: INTERNAL MEDICINE

## 2022-10-10 PROCEDURE — G8420 CALC BMI NORM PARAMETERS: HCPCS | Performed by: INTERNAL MEDICINE

## 2022-10-10 PROCEDURE — G8427 DOCREV CUR MEDS BY ELIG CLIN: HCPCS | Performed by: INTERNAL MEDICINE

## 2022-10-10 PROCEDURE — G8484 FLU IMMUNIZE NO ADMIN: HCPCS | Performed by: INTERNAL MEDICINE

## 2022-10-10 RX ORDER — FLUTICASONE FUROATE, UMECLIDINIUM BROMIDE AND VILANTEROL TRIFENATATE 200; 62.5; 25 UG/1; UG/1; UG/1
1 POWDER RESPIRATORY (INHALATION) DAILY
Qty: 1 EACH | Refills: 11 | Status: SHIPPED | OUTPATIENT
Start: 2022-10-10

## 2022-10-10 NOTE — ASSESSMENT & PLAN NOTE
Currently using less than 2 L nasal cannula. We discussed weaning based on oxygen saturations.   She will wean to 90%

## 2022-10-10 NOTE — ASSESSMENT & PLAN NOTE
Extensive scarring and cavity remaining. This was reviewed on the CT test.  She has completed antibiotics. Long-term complications discussed including recurrent infections and difficulty with diagnosis of future pneumonias.

## 2022-10-10 NOTE — ASSESSMENT & PLAN NOTE
Continues Trelegy and DuoNebs 2-3 times daily. She is fighting to regain her strength for last hospitalization.   She was encouraged to continue exercises much as able    Mild wheezing noted on exam.  This is likely secondary to abnormal airway from scarring rather than COPD exacerbation

## 2022-10-10 NOTE — ASSESSMENT & PLAN NOTE
Restriction was demonstrated on CT chest.  There is a clear elevation of the right hemidiaphragm that is secondary to collapse with fibrosis of the right upper lobe. We discussed chronic symptoms of this and chronic shortness of breath. She expressed understanding for this.

## 2022-10-10 NOTE — PROGRESS NOTES
REASON FOR CONSULTATION/CC: COPD          PCP: Mariaelena Sarah, HECTOR - CNP    HISTORY OF PRESENT ILLNESS: Sara Saba is a 78y.o. year old female with a history of COPD who presents :           COPD Assessment Test (CAT)                                       COPD    Trelegy    Duoneb's  using 2-3 times per day     Weakness. Working on strength. Trying to recover from hospitalization     Chronic hypoxemia  Currently < 2 L NC     Abnormal radiograph            Fleischner Society Recommendations:         Objective:   PHYSICAL EXAM:  Blood pressure 100/60, pulse 99, temperature 97.1 °F (36.2 °C), resp. rate 19, height 5' 4\" (1.626 m), weight 121 lb 12.8 oz (55.2 kg), SpO2 98 %, not currently breastfeeding.'    Gen: No distress. Eyes:    ENT:    Neck:    Resp: No accessory muscle use. No crackles. Few wheezes, not musical. No rhonchi. CV: Regular rate. Regular rhythm. No murmur or rub. No edema. GI:    Skin:    Lymph:    M/S: No cyanosis. No clubbing. Neuro: Moves all four extremities. Psych: Oriented x 3. No anxiety. Awake. Alert. Intact judgement and insight. Data Reviewed:        Assessment:     COPD, Moderately severe FEV1 59%  Tobacco abuse. 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  There is a clear elevation of the right hemidiaphragm that is secondary to collapse with fibrosis of the right upper lobe. We discussed chronic symptoms of this and chronic shortness of breath. She expressed understanding for this.          Relevant Medications    Fluticasone-Umeclidin-Vilant (TRELEGY ELLIPTA) 200-62.5-25 MCG/INH AEPB    Cavitary pneumonia      Extensive scarring and cavity remaining. This was reviewed on the CT test.  She has completed antibiotics. Long-term complications discussed including recurrent infections and difficulty with diagnosis of future pneumonias. Relevant Medications    Fluticasone-Umeclidin-Vilant (TRELEGY ELLIPTA) 200-62.5-25 MCG/INH AEPB    COPD, severe (HCC)     Continues Trelegy and DuoNebs 2-3 times daily. She is fighting to regain her strength for last hospitalization. She was encouraged to continue exercises much as able    Mild wheezing noted on exam.  This is likely secondary to abnormal airway from scarring rather than COPD exacerbation         Relevant Medications    Fluticasone-Umeclidin-Vilant (TRELEGY ELLIPTA) 200-62.5-25 MCG/INH AEPB    Chronic hypoxemic respiratory failure (HCC)      Currently using less than 2 L nasal cannula. We discussed weaning based on oxygen saturations. She will wean to 90%            This note was transcribed using [id number]. Please disregard any translational errors.

## 2022-10-12 NOTE — TELEPHONE ENCOUNTER
Khloe Liu can be reached at: [phone]      Medication Refill    Medication needing refilled:  prasugrel (EFFIENT)     Dosage of the medication:  10 MG TABS     How are you taking this medication (QD, BID, TID, QID, PRN):  Take 1 tablet by mouth in the morning. 30 or 90 day supply called in:  30 day supply    When will you run out of your medication:   out today    Which Pharmacy are we sending the medication to?:    [facility]    PHONE #: [phone]  FAX #: n/a

## 2022-10-13 ENCOUNTER — OFFICE VISIT (OUTPATIENT)
Dept: FAMILY MEDICINE CLINIC | Age: 77
End: 2022-10-13
Payer: MEDICARE

## 2022-10-13 VITALS
TEMPERATURE: 98.2 F | HEART RATE: 71 BPM | DIASTOLIC BLOOD PRESSURE: 70 MMHG | BODY MASS INDEX: 21.34 KG/M2 | HEIGHT: 64 IN | WEIGHT: 125 LBS | RESPIRATION RATE: 18 BRPM | OXYGEN SATURATION: 98 % | SYSTOLIC BLOOD PRESSURE: 122 MMHG

## 2022-10-13 DIAGNOSIS — J98.4 CAVITARY PNEUMONIA: ICD-10-CM

## 2022-10-13 DIAGNOSIS — Z00.00 MEDICARE ANNUAL WELLNESS VISIT, SUBSEQUENT: Primary | ICD-10-CM

## 2022-10-13 DIAGNOSIS — J18.9 CAVITARY PNEUMONIA: ICD-10-CM

## 2022-10-13 DIAGNOSIS — J44.9 COPD, SEVERE (HCC): ICD-10-CM

## 2022-10-13 PROCEDURE — 1123F ACP DISCUSS/DSCN MKR DOCD: CPT | Performed by: NURSE PRACTITIONER

## 2022-10-13 PROCEDURE — G8484 FLU IMMUNIZE NO ADMIN: HCPCS | Performed by: NURSE PRACTITIONER

## 2022-10-13 PROCEDURE — G0439 PPPS, SUBSEQ VISIT: HCPCS | Performed by: NURSE PRACTITIONER

## 2022-10-13 PROCEDURE — 3017F COLORECTAL CA SCREEN DOC REV: CPT | Performed by: NURSE PRACTITIONER

## 2022-10-13 RX ORDER — IPRATROPIUM BROMIDE AND ALBUTEROL SULFATE 2.5; .5 MG/3ML; MG/3ML
SOLUTION RESPIRATORY (INHALATION)
Qty: 360 ML | Refills: 0 | Status: SHIPPED | OUTPATIENT
Start: 2022-10-13

## 2022-10-13 SDOH — ECONOMIC STABILITY: FOOD INSECURITY: WITHIN THE PAST 12 MONTHS, THE FOOD YOU BOUGHT JUST DIDN'T LAST AND YOU DIDN'T HAVE MONEY TO GET MORE.: NEVER TRUE

## 2022-10-13 SDOH — ECONOMIC STABILITY: FOOD INSECURITY: WITHIN THE PAST 12 MONTHS, YOU WORRIED THAT YOUR FOOD WOULD RUN OUT BEFORE YOU GOT MONEY TO BUY MORE.: NEVER TRUE

## 2022-10-13 ASSESSMENT — PATIENT HEALTH QUESTIONNAIRE - PHQ9
5. POOR APPETITE OR OVEREATING: 0
7. TROUBLE CONCENTRATING ON THINGS, SUCH AS READING THE NEWSPAPER OR WATCHING TELEVISION: 2
10. IF YOU CHECKED OFF ANY PROBLEMS, HOW DIFFICULT HAVE THESE PROBLEMS MADE IT FOR YOU TO DO YOUR WORK, TAKE CARE OF THINGS AT HOME, OR GET ALONG WITH OTHER PEOPLE: 0
4. FEELING TIRED OR HAVING LITTLE ENERGY: 2
3. TROUBLE FALLING OR STAYING ASLEEP: 0
SUM OF ALL RESPONSES TO PHQ QUESTIONS 1-9: 6
SUM OF ALL RESPONSES TO PHQ QUESTIONS 1-9: 6
8. MOVING OR SPEAKING SO SLOWLY THAT OTHER PEOPLE COULD HAVE NOTICED. OR THE OPPOSITE, BEING SO FIGETY OR RESTLESS THAT YOU HAVE BEEN MOVING AROUND A LOT MORE THAN USUAL: 0
SUM OF ALL RESPONSES TO PHQ9 QUESTIONS 1 & 2: 1
SUM OF ALL RESPONSES TO PHQ QUESTIONS 1-9: 6
2. FEELING DOWN, DEPRESSED OR HOPELESS: 0
1. LITTLE INTEREST OR PLEASURE IN DOING THINGS: 1
6. FEELING BAD ABOUT YOURSELF - OR THAT YOU ARE A FAILURE OR HAVE LET YOURSELF OR YOUR FAMILY DOWN: 1
SUM OF ALL RESPONSES TO PHQ QUESTIONS 1-9: 6
9. THOUGHTS THAT YOU WOULD BE BETTER OFF DEAD, OR OF HURTING YOURSELF: 0

## 2022-10-13 ASSESSMENT — LIFESTYLE VARIABLES
HOW MANY STANDARD DRINKS CONTAINING ALCOHOL DO YOU HAVE ON A TYPICAL DAY: PATIENT DOES NOT DRINK
HOW OFTEN DO YOU HAVE A DRINK CONTAINING ALCOHOL: NEVER

## 2022-10-13 ASSESSMENT — SOCIAL DETERMINANTS OF HEALTH (SDOH): HOW HARD IS IT FOR YOU TO PAY FOR THE VERY BASICS LIKE FOOD, HOUSING, MEDICAL CARE, AND HEATING?: VERY HARD

## 2022-10-13 NOTE — PATIENT INSTRUCTIONS
Personalized Preventive Plan for Елена Diaz - 10/13/2022  Medicare offers a range of preventive health benefits. Some of the tests and screenings are paid in full while other may be subject to a deductible, co-insurance, and/or copay. Some of these benefits include a comprehensive review of your medical history including lifestyle, illnesses that may run in your family, and various assessments and screenings as appropriate. After reviewing your medical record and screening and assessments performed today your provider may have ordered immunizations, labs, imaging, and/or referrals for you. A list of these orders (if applicable) as well as your Preventive Care list are included within your After Visit Summary for your review. Other Preventive Recommendations:    A preventive eye exam performed by an eye specialist is recommended every 1-2 years to screen for glaucoma; cataracts, macular degeneration, and other eye disorders. A preventive dental visit is recommended every 6 months. Try to get at least 150 minutes of exercise per week or 10,000 steps per day on a pedometer . Order or download the FREE \"Exercise & Physical Activity: Your Everyday Guide\" from [employer]. Call 8-633.710.5179 or search [employer] online. You need 6293-3005 mg of calcium and 3889-0137 IU of vitamin D per day. It is possible to meet your calcium requirement with diet alone, but a vitamin D supplement is usually necessary to meet this goal.  When exposed to the sun, use a sunscreen that protects against both UVA and UVB radiation with an SPF of 30 or greater. Reapply every 2 to 3 hours or after sweating, drying off with a towel, or swimming. Always wear a seat belt when traveling in a car. Always wear a helmet when riding a bicycle or motorcycle.

## 2022-10-13 NOTE — PROGRESS NOTES
Medicare Annual Wellness Visit    Kylie Mckenzie is here for Medicare AWV (Doing well with oxygen. )    Assessment & Plan   Medicare annual wellness visit, subsequent  Advised to complete Cologuard for screening  Should obtain her second shingrix at her pharmacy     COPD, severe ([address]  -     ipratropium-albuterol (DUONEB) 0.5-2.5 (3) MG/3ML SOLN nebulizer solution; Take 1 aerosol every 4-6 hours as needed for shortness of breath coughing and wheezing, Disp-360 mL, R-0Normal  Stable. Continue trelegy inhaler daily  Continue f/u with pulmnologist Dr. Fisher Degree to use oxygen therapy    Cavitary pneumonia  Has completed antibiotic. Weight is increasing from hospitalization from weight loss in hospital.   Continues to work on regaining strength. Continues with home nursing, PT/OT. Recommendations for Preventive Services Due: see orders and patient instructions/AVS.  Recommended screening schedule for the next 5-10 years is provided to the patient in written form: see Patient Instructions/AVS.     Return in 3 months (on 1/13/2023), or if symptoms worsen or fail to improve, for chronic conditions. Subjective     History of cavitary pneumonia in July 2022. Following with pulm Dr. Joseluis Melendrez and ID Dr. Ailin Jamison. Recently stopped IV antibiotic. Recent CT showed extensive scarring and cavity remaining. Continues to have home healthy nursing and PT/OT. Reports that nursing comes weekly and therapy comes 1-2 times per week. Reports that her CBC and renal function has been checked weekly. Feels that her strength is improving, but has not returned to her baseline. Severe COPD- on trelegy daily. Continues with continuous O2 at 2 L. She has been trying to wean.            Lab Results   Component Value Date    LABA1C 6.0 07/03/2022    LABA1C 5.9 06/03/2022    LABA1C 5.8 02/01/2022     Lab Results   Component Value Date    LABMICR YES 07/22/2022    CREATININE 0.7 10/03/2022     Lab Results   Component Value Date    ALT 11 08/31/2022    AST 9 08/31/2022     Lab Results   Component Value Date    CHOL 171 02/17/2022    TRIG 146 02/17/2022    HDL 59 02/17/2022    LDLCALC 83 02/17/2022    LDLDIRECT 176 (H) 10/12/2012            Patient's complete Health Risk Assessment and screening values have been reviewed and are found in Flowsheets. The following problems were reviewed today and where indicated follow up appointments were made and/or referrals ordered. Positive Risk Factor Screenings with Interventions:      Depression:  PHQ-2 Score: 1  PHQ-9 Total Score: 6    Severity:1-4 = minimal depression, 5-9 = mild depression, 10-14 = moderate depression, 15-19 = moderately severe depression, 20-27 = severe depression  Depression Interventions:  Patient reports that mood is improving after her long hospital stay. Feels that mood is better with the cymbalta BID. Patient continues to follow with pain management Dr. Ying Narayan. She reports that her percocet dose was recently decreased. Opioid Risk: (Low risk score <55) Opioid risk score: 21    Patient is low risk for opioid use disorder or overdose. Last PDMP Sheldon Aaron as Reviewed:  Review User Review Instant Review Result   CIRA KEBEDE 7/13/2022 11:21 AM     Reviewed PDMP [1]     Last Controlled Substance Monitoring Documentation      [id number] Visit from 5/15/2019 in [facility]   Attestation The Prescription Monitoring Report for this patient was reviewed today.  filed at 05/15/2019 [address] and ACP:  General  In general, how would you say your health is?: Fair  In the past 7 days, have you experienced any of the following: New or Increased Pain, New or Increased Fatigue, Loneliness, Social Isolation, Stress or Anger?: No  Do you get the social and emotional support that you need?: Yes  Do you have a Living Will?: (!) No    Advance Directives       Power of [address] [place] [address] Will ACP-Advance Directive ACP-Power of     Not on File Filed on 08/01/22 Filed Not on File        General Health Risk Interventions:  No Living Will: ACP documents already completed- patient asked to provide copy to the office    Health Habits/Nutrition:  Physical Activity: Insufficiently Active    Days of Exercise per Week: 3 days    Minutes of Exercise per Session: 40 min     Have you lost any weight without trying in the past 3 months?: (!) Yes  Body mass index: 21.45  Have you seen the dentist within the past year?: (!) No  Health Habits/Nutrition Interventions:  Inadequate physical activity:  Patient has been working on increasing her activity since her hospitalization. Dental exam overdue:  patient encouraged to make appointment with his/her dentist      ADLs:  In the past 7 days, did you need help from others to perform any of the following everyday activities: Eating, dressing, grooming, bathing, toileting, or walking/balance?: No  In the past 7 days, did you need help from others to take care of any of the following: Laundry, housekeeping, banking/finances, shopping, telephone use, food preparation, transportation, or taking medications?: (!) Yes  Select all that apply: (!) Food Preparation, Transportation  ADL Interventions:  Patient declines any further evaluation/treatment for this issue  Lives with partner Adis Beard who has been assisting with food prep, house cleaning, transportation. Objective   Vitals:    10/13/22 1101   BP: 122/70   Site: Right Upper Arm   Position: Sitting   Cuff Size: Medium Adult   Pulse: 71   Resp: 18   Temp: 98.2 °F (36.8 °C)   TempSrc: Oral   SpO2: 98%   Weight: 125 lb (56.7 kg)   Height: 5' 4\" (1.626 m)      Body mass index is 21.46 kg/m². General Appearance: alert and oriented to person, place and time, well developed and well- nourished, in no acute distress.  Sitting in wheelchair- wearing O2 via NC  Skin: warm and dry, no rash or erythema  Head: normocephalic and atraumatic  Pulmonary/Chest: no respiratory distress, few scattered wheezes   Cardiovascular: normal rate, regular rhythm, normal S1 and S2, no murmurs, rubs, clicks, or gallops, distal pulses intact, no carotid bruits  Extremities: no cyanosis, clubbing or edema       No Known Allergies  Prior to Visit Medications    Medication Sig Taking? Authorizing Provider   Fluticasone-Umeclidin-Vilant (TRELEGY ELLIPTA) 200-62.5-25 MCG/INH AEPB Inhale 1 puff into the lungs daily Yes Meliza Frost MD   pantoprazole (PROTONIX) 40 MG tablet Take 1 tablet by mouth daily Yes Jayesh Thomson MD   traZODone (DESYREL) 50 MG tablet Take 1-2 tablets by mouth nightly Yes Jayesh Thomson MD   gabapentin (NEURONTIN) 600 MG tablet Take 1 tablet by mouth 2 times daily for 30 days. Yes Jayesh Thomson MD   oxyCODONE-acetaminophen (PERCOCET) 5-325 MG per tablet Take 1 tablet by mouth every 6 hours as needed for Pain (Max 4 per day) for up to 28 days. Yes Jayesh Thomson MD   DULoxetine (CYMBALTA) 60 MG extended release capsule TAKE 1 CAPSULE BY MOUTH TWICE A DAY Yes HECTOR Tim CNP   alendronate (FOSAMAX) 70 MG tablet TAKE 1 TABLET BY MOUTH ONE TIME PER WEEK Yes HECTOR Tim CNP   aspirin 81 MG EC tablet Take 1 tablet by mouth in the morning. Yes Bong Levy MD   prasugrel (EFFIENT) 10 MG TABS Take 1 tablet by mouth in the morning.  Yes Bong Levy MD   albuterol sulfate  (90 Base) MCG/ACT inhaler Inhale 2 puffs into the lungs every 6 hours as needed for Shortness of Breath Yes Meliza Frost MD   Calcium Citrate-Vitamin D 500-500 MG-UNIT CHEW Take 1 tablet by mouth 2 times daily Yes HECTOR Tim CNP   ONE TOUCH LANCETS MISC 1 each by Does not apply route daily Yes HECTOR Tim CNP   ipratropium-albuterol (DUONEB) 0.5-2.5 (3) MG/3ML SOLN nebulizer solution Take 1 aerosol every 4 hours for 2 days and then as needed for shortness of breath coughing and wheezing Yes Nicole Oconnor MD   Cholecalciferol (VITAMIN D) 2000 units TABS tablet Take 1 tablet by mouth daily Yes HECTOR Sanderson CNP   cetirizine (ZYRTEC) 10 MG tablet Take 10 mg by mouth daily as needed for Allergies Yes Historical Provider, MD   Nebulizers (COMPRESSOR/NEBULIZER) MISC 1 Units by Does not apply route 4 times daily Yes Serene Sigala MD   atorvastatin (LIPITOR) 80 MG tablet TAKE 1 TABLET BY MOUTH EVERY DAY FOR CHOLESTEROL  HECTOR Sanderson CNP   meloxicam (MOBIC) 15 MG tablet Take 1 tablet by mouth daily  Ector Zapata MD       [facility] (Including outside providers/suppliers regularly involved in providing care):   Patient Care Team:  HECTOR Sanderson CNP as PCP - General (Nurse Practitioner)  HECTOR Sanderson CNP as PCP - [facility] Empaneled Provider  Carly Goodson MD as Consulting Physician (Pulmonology)  Ector Zapata MD as Consulting Physician (Pain Management)  Jackie Metzger MD as Consulting Physician (Gastroenterology)  Arin Diaz MD as Consulting Physician (Ophthalmology)  Melecio Sorensen RN as Nurse Navigator     Reviewed and updated this visit:  Tobacco  Allergies  Meds  Med Hx  Surg Hx  Soc Hx  Fam Hx

## 2022-10-16 ENCOUNTER — PATIENT MESSAGE (OUTPATIENT)
Dept: CARDIOLOGY CLINIC | Age: 77
End: 2022-10-16

## 2022-10-17 RX ORDER — PRASUGREL 10 MG/1
10 TABLET, FILM COATED ORAL DAILY
Qty: 90 TABLET | Refills: 3 | Status: SHIPPED | OUTPATIENT
Start: 2022-10-17 | End: 2022-10-20 | Stop reason: SDUPTHER

## 2022-10-17 NOTE — TELEPHONE ENCOUNTER
From: Fang Ferreira  To: Dr. Sudhir Wilkinsbus: 10/16/2022 10:43 AM EDT  Subject: Effient Refill    Last week I called office and requested a refill for Effient 10 mg to [facility]. They have not received a prescription for this. Just following up to see if I should still be taking this drug. If so please have an Rx sent to [facility]. I have less than a week supply.  Thanks, Stiven Goodson

## 2022-10-17 NOTE — TELEPHONE ENCOUNTER
Per last office note.  7/19/22 KIRILL to LCx   Continue Effient and ASA for DAPT   She may discontinue Effient after 6 months of therapy given prior bleeding episode

## 2022-10-18 ENCOUNTER — OFFICE VISIT (OUTPATIENT)
Dept: PAIN MANAGEMENT | Age: 77
End: 2022-10-18
Payer: MEDICARE

## 2022-10-18 VITALS
WEIGHT: 125 LBS | DIASTOLIC BLOOD PRESSURE: 75 MMHG | SYSTOLIC BLOOD PRESSURE: 132 MMHG | HEART RATE: 71 BPM | BODY MASS INDEX: 21.46 KG/M2

## 2022-10-18 DIAGNOSIS — M51.36 DDD (DEGENERATIVE DISC DISEASE), LUMBAR: ICD-10-CM

## 2022-10-18 DIAGNOSIS — G89.4 CHRONIC PAIN SYNDROME: ICD-10-CM

## 2022-10-18 DIAGNOSIS — M70.61 TROCHANTERIC BURSITIS OF RIGHT HIP: ICD-10-CM

## 2022-10-18 DIAGNOSIS — M79.7 FIBROMYALGIA: ICD-10-CM

## 2022-10-18 DIAGNOSIS — M47.26 OSTEOARTHRITIS OF SPINE WITH RADICULOPATHY, LUMBAR REGION: ICD-10-CM

## 2022-10-18 DIAGNOSIS — M51.37 DEGENERATION OF LUMBAR OR LUMBOSACRAL INTERVERTEBRAL DISC: ICD-10-CM

## 2022-10-18 DIAGNOSIS — M47.816 LUMBAR SPONDYLOSIS: ICD-10-CM

## 2022-10-18 PROCEDURE — 1090F PRES/ABSN URINE INCON ASSESS: CPT | Performed by: INTERNAL MEDICINE

## 2022-10-18 PROCEDURE — G8399 PT W/DXA RESULTS DOCUMENT: HCPCS | Performed by: INTERNAL MEDICINE

## 2022-10-18 PROCEDURE — G8427 DOCREV CUR MEDS BY ELIG CLIN: HCPCS | Performed by: INTERNAL MEDICINE

## 2022-10-18 PROCEDURE — G8484 FLU IMMUNIZE NO ADMIN: HCPCS | Performed by: INTERNAL MEDICINE

## 2022-10-18 PROCEDURE — 99213 OFFICE O/P EST LOW 20 MIN: CPT | Performed by: INTERNAL MEDICINE

## 2022-10-18 PROCEDURE — 3017F COLORECTAL CA SCREEN DOC REV: CPT | Performed by: INTERNAL MEDICINE

## 2022-10-18 PROCEDURE — G8420 CALC BMI NORM PARAMETERS: HCPCS | Performed by: INTERNAL MEDICINE

## 2022-10-18 PROCEDURE — 1123F ACP DISCUSS/DSCN MKR DOCD: CPT | Performed by: INTERNAL MEDICINE

## 2022-10-18 PROCEDURE — 1036F TOBACCO NON-USER: CPT | Performed by: INTERNAL MEDICINE

## 2022-10-18 RX ORDER — OXYCODONE HYDROCHLORIDE AND ACETAMINOPHEN 5; 325 MG/1; MG/1
1 TABLET ORAL EVERY 6 HOURS PRN
Qty: 112 TABLET | Refills: 0 | Status: SHIPPED | OUTPATIENT
Start: 2022-10-18 | End: 2022-11-15

## 2022-10-18 RX ORDER — GABAPENTIN 600 MG/1
600 TABLET ORAL 2 TIMES DAILY
Qty: 60 TABLET | Refills: 1 | Status: SHIPPED | OUTPATIENT
Start: 2022-10-18 | End: 2022-11-17

## 2022-10-18 RX ORDER — TRAZODONE HYDROCHLORIDE 50 MG/1
50-100 TABLET ORAL NIGHTLY
Qty: 60 TABLET | Refills: 1 | Status: SHIPPED | OUTPATIENT
Start: 2022-10-18

## 2022-10-18 RX ORDER — PANTOPRAZOLE SODIUM 40 MG/1
40 TABLET, DELAYED RELEASE ORAL DAILY
Qty: 30 TABLET | Refills: 1 | Status: SHIPPED | OUTPATIENT
Start: 2022-10-18

## 2022-10-18 NOTE — PROGRESS NOTES
Stella Standard  1945  [id number]      HISTORY OF PRESENT ILLNESS:  Ms. Silvia Head is a 78 y.o. female returns for a follow up visit for pain management  She has a diagnosis of   1. Chronic pain syndrome    2. DDD (degenerative disc disease), lumbar    3. Osteoarthritis of spine with radiculopathy, lumbar region    4. Lumbar spondylosis    5. Fibromyalgia    6. Trochanteric bursitis of right hip    7. Degeneration of lumbar or lumbosacral intervertebral disc    . She complains of pain in the  Low back, buttock   She rates the pain 6/10 and describes it as sharp, aching, burning. Current treatment regimen has helped relieve about 50% of the pain. She denies any side effects from the current pain regimen. Patient reports that since the last follow up visit the physical functioning is unchanged, family/social relationships are unchanged, mood is unchanged sleep patterns are unchanged, and that the overall functioning is unchanged. Patient denies misusing/abusing her narcotic pain medications or using any illegal drugs. There are No indicators for possible drug abuse, addiction or diversion problems. Patient reports she has been off all her antibiotics. She says her breathing has been okay, still using oxygen. She mentions she has quit smoking. She denies any constipation symptoms. She reports she has been managing her ADLs. And house chores. ALLERGIES: Patients list of allergies were reviewed     MEDICATIONS: Ms. Silvia Head list of medications were reviewed. Her current medications are   Outpatient Medications Prior to Visit   Medication Sig Dispense Refill    prasugrel (EFFIENT) 10 MG TABS Take 1 tablet by mouth daily 90 tablet 3    ipratropium-albuterol (DUONEB) 0.5-2.5 (3) MG/3ML SOLN nebulizer solution Take 1 aerosol every 4-6 hours as needed for shortness of breath coughing and wheezing 360 mL 0    Fluticasone-Umeclidin-Vilant (TRELEGY ELLIPTA) 200-62.5-25 MCG/INH AEPB Inhale 1 puff into the lungs daily 1 each 11    pantoprazole (PROTONIX) 40 MG tablet Take 1 tablet by mouth daily 30 tablet 1    traZODone (DESYREL) 50 MG tablet Take 1-2 tablets by mouth nightly 60 tablet 1    gabapentin (NEURONTIN) 600 MG tablet Take 1 tablet by mouth 2 times daily for 30 days. 60 tablet 0    oxyCODONE-acetaminophen (PERCOCET) 5-325 MG per tablet Take 1 tablet by mouth every 6 hours as needed for Pain (Max 4 per day) for up to 28 days. 112 tablet 0    DULoxetine (CYMBALTA) 60 MG extended release capsule TAKE 1 CAPSULE BY MOUTH TWICE A  capsule 0    alendronate (FOSAMAX) 70 MG tablet TAKE 1 TABLET BY MOUTH ONE TIME PER WEEK 12 tablet 1    aspirin 81 MG EC tablet Take 1 tablet by mouth in the morning. 30 tablet 3    albuterol sulfate  (90 Base) MCG/ACT inhaler Inhale 2 puffs into the lungs every 6 hours as needed for Shortness of Breath 1 each 11    Calcium Citrate-Vitamin D 500-500 MG-UNIT CHEW Take 1 tablet by mouth 2 times daily      ONE TOUCH LANCETS MISC 1 each by Does not apply route daily 100 each 3    Cholecalciferol (VITAMIN D) 2000 units TABS tablet Take 1 tablet by mouth daily 30 tablet     cetirizine (ZYRTEC) 10 MG tablet Take 10 mg by mouth daily as needed for Allergies      Nebulizers (COMPRESSOR/NEBULIZER) MISC 1 Units by Does not apply route 4 times daily 1 each 3     No facility-administered medications prior to visit. REVIEW OF SYSTEMS:    Respiratory: Negative for apnea, chest tightness and shortness of breath or change in baseline breathing. PHYSICAL EXAM:   Nursing note and vitals reviewed. /75   Pulse 71   Wt 125 lb (56.7 kg)   BMI 21.46 kg/m²   Constitutional: She appears well-developed and well-nourished. No acute distress. On oxygen   Cardiovascular: Normal rate, regular rhythm, normal heart sounds, and does not have murmur. Pulmonary/Chest: Effort normal. No respiratory distress. She does not have wheezes in the lung fields. She has no rales.   Decrease air exchange bilaterally     Neurological/Psychiatric:She is alert and oriented to person, place, and time. Coordination is  normal.  Her mood isAppropriate and affect is Neutral/Euthymic(normal) . IMPRESSION:   1. Chronic pain syndrome    2. DDD (degenerative disc disease), lumbar    3. Osteoarthritis of spine with radiculopathy, lumbar region    4. Lumbar spondylosis    5. Fibromyalgia    6. Trochanteric bursitis of right hip    7. Degeneration of lumbar or lumbosacral intervertebral disc        PLAN:  Informed verbal consent was obtained  -[place] record was obtained and reviewed  for the last one year and no indicators of drug misuse  were found. Any other controlled substance prescriptions  seen on the record have been accounted for, I am aware of the patient receiving these medications. [facility] [place] record will be rechecked as part of office protocol.    -Interim history reviewed   -She was advised to increase fluids ( 5-7  glasses of fluid daily), limit caffeine, avoid cheese products, increase dietary fiber, increase activity and exercise as tolerated and relax regularly and enjoy meals   -Walking as tolerated   -Continue with Percocet 4 per day   Current Outpatient Medications   Medication Sig Dispense Refill    prasugrel (EFFIENT) 10 MG TABS Take 1 tablet by mouth daily 90 tablet 3    ipratropium-albuterol (DUONEB) 0.5-2.5 (3) MG/3ML SOLN nebulizer solution Take 1 aerosol every 4-6 hours as needed for shortness of breath coughing and wheezing 360 mL 0    Fluticasone-Umeclidin-Vilant (TRELEGY ELLIPTA) 200-62.5-25 MCG/INH AEPB Inhale 1 puff into the lungs daily 1 each 11    pantoprazole (PROTONIX) 40 MG tablet Take 1 tablet by mouth daily 30 tablet 1    traZODone (DESYREL) 50 MG tablet Take 1-2 tablets by mouth nightly 60 tablet 1    gabapentin (NEURONTIN) 600 MG tablet Take 1 tablet by mouth 2 times daily for 30 days.  60 tablet 0    oxyCODONE-acetaminophen (PERCOCET) 5-325 MG per tablet Take 1 tablet by mouth every 6 hours as needed for Pain (Max 4 per day) for up to 28 days. 112 tablet 0    DULoxetine (CYMBALTA) 60 MG extended release capsule TAKE 1 CAPSULE BY MOUTH TWICE A  capsule 0    alendronate (FOSAMAX) 70 MG tablet TAKE 1 TABLET BY MOUTH ONE TIME PER WEEK 12 tablet 1    aspirin 81 MG EC tablet Take 1 tablet by mouth in the morning. 30 tablet 3    albuterol sulfate  (90 Base) MCG/ACT inhaler Inhale 2 puffs into the lungs every 6 hours as needed for Shortness of Breath 1 each 11    Calcium Citrate-Vitamin D 500-500 MG-UNIT CHEW Take 1 tablet by mouth 2 times daily      ONE TOUCH LANCETS MISC 1 each by Does not apply route daily 100 each 3    Cholecalciferol (VITAMIN D) 2000 units TABS tablet Take 1 tablet by mouth daily 30 tablet     cetirizine (ZYRTEC) 10 MG tablet Take 10 mg by mouth daily as needed for Allergies      Nebulizers (COMPRESSOR/NEBULIZER) MISC 1 Units by Does not apply route 4 times daily 1 each 3     No current facility-administered medications for this visit. I will continue her current medication regimen  which is part of the above treatment schedule. It has been helping with Ms. Lauren Dunaway chronic  medical problems which for this visit include:   Diagnoses of Chronic pain syndrome, DDD (degenerative disc disease), lumbar, Osteoarthritis of spine with radiculopathy, lumbar region, Lumbar spondylosis, Fibromyalgia, Trochanteric bursitis of right hip, and Degeneration of lumbar or lumbosacral intervertebral disc were pertinent to this visit. Risks and benefits of the medications and other alternative treatments  including no treatment were discussed with the patient. The common side effects of these medications were also explained to the patient. Informed verbal consent was obtained.    Goals of current treatment regimen include improvement in pain, restoration of functioning- with focus on improvement in physical performance, general activity, work or disability,emotional distress, health care utilization and  decreased medication consumption. Will continue to monitor progress towards achieving/maintaining therapeutic goals with special emphasis on  1. Improvement in perceived interfernce  of pain with ADL's. Ability to do home exercises independently. Ability to do household chores indoor and/or outdoor work and social and leisure activities. Improve psychosocial and physical functioning. - she is showing progression towards this treatment goal with the current regimen. She was advised against drinking alcohol with the narcotic pain medicines, advised against driving or handling machinery while adjusting the dose of medicines or if having cognitive  issues related to the current medications. Risk of overdose and death, if medicines not taken as prescribed, were also discussed. If the patient develops new symptoms or if the symptoms worsen, the patient should call the office. While transcribing every attempt was made to maintain the accuracy of the note in terms of it's contents,there may have been some errors made inadvertently. Thank you for allowing me to participate in the care of this patient.     Bharathi Sullivan MD.    Cc: Trinity Mcneil, HECTOR - CNP

## 2022-10-20 RX ORDER — PRASUGREL 10 MG/1
10 TABLET, FILM COATED ORAL DAILY
Qty: 90 TABLET | Refills: 3 | Status: SHIPPED | OUTPATIENT
Start: 2022-10-20

## 2022-11-22 ENCOUNTER — OFFICE VISIT (OUTPATIENT)
Dept: PAIN MANAGEMENT | Age: 77
End: 2022-11-22
Payer: MEDICARE

## 2022-11-22 VITALS
DIASTOLIC BLOOD PRESSURE: 67 MMHG | WEIGHT: 130 LBS | BODY MASS INDEX: 22.31 KG/M2 | HEART RATE: 69 BPM | SYSTOLIC BLOOD PRESSURE: 133 MMHG

## 2022-11-22 DIAGNOSIS — M51.37 DEGENERATION OF LUMBAR OR LUMBOSACRAL INTERVERTEBRAL DISC: ICD-10-CM

## 2022-11-22 DIAGNOSIS — G89.4 CHRONIC PAIN SYNDROME: ICD-10-CM

## 2022-11-22 DIAGNOSIS — M47.816 LUMBAR SPONDYLOSIS: ICD-10-CM

## 2022-11-22 DIAGNOSIS — M70.61 TROCHANTERIC BURSITIS OF RIGHT HIP: ICD-10-CM

## 2022-11-22 DIAGNOSIS — M47.26 OSTEOARTHRITIS OF SPINE WITH RADICULOPATHY, LUMBAR REGION: ICD-10-CM

## 2022-11-22 DIAGNOSIS — M79.7 FIBROMYALGIA: ICD-10-CM

## 2022-11-22 DIAGNOSIS — M51.36 DDD (DEGENERATIVE DISC DISEASE), LUMBAR: ICD-10-CM

## 2022-11-22 PROCEDURE — G8484 FLU IMMUNIZE NO ADMIN: HCPCS | Performed by: INTERNAL MEDICINE

## 2022-11-22 PROCEDURE — 3017F COLORECTAL CA SCREEN DOC REV: CPT | Performed by: INTERNAL MEDICINE

## 2022-11-22 PROCEDURE — 99213 OFFICE O/P EST LOW 20 MIN: CPT | Performed by: INTERNAL MEDICINE

## 2022-11-22 PROCEDURE — G8399 PT W/DXA RESULTS DOCUMENT: HCPCS | Performed by: INTERNAL MEDICINE

## 2022-11-22 PROCEDURE — 1123F ACP DISCUSS/DSCN MKR DOCD: CPT | Performed by: INTERNAL MEDICINE

## 2022-11-22 PROCEDURE — 1090F PRES/ABSN URINE INCON ASSESS: CPT | Performed by: INTERNAL MEDICINE

## 2022-11-22 PROCEDURE — G8420 CALC BMI NORM PARAMETERS: HCPCS | Performed by: INTERNAL MEDICINE

## 2022-11-22 PROCEDURE — 1036F TOBACCO NON-USER: CPT | Performed by: INTERNAL MEDICINE

## 2022-11-22 PROCEDURE — G8427 DOCREV CUR MEDS BY ELIG CLIN: HCPCS | Performed by: INTERNAL MEDICINE

## 2022-11-22 PROCEDURE — 20553 NJX 1/MLT TRIGGER POINTS 3/>: CPT | Performed by: INTERNAL MEDICINE

## 2022-11-22 NOTE — PROGRESS NOTES
Renny Rodgers  1945  [id number]      HISTORY OF PRESENT ILLNESS:  Ms. Shalini Galeano is a 78 y.o. female returns for a follow up visit for pain management  She has a diagnosis of   1. Chronic pain syndrome    2. Lumbar spondylosis    3. Degeneration of lumbar or lumbosacral intervertebral disc    4. Muscle cramping    5. Fibromyalgia    6. Osteoarthritis of spine with radiculopathy, lumbar region    7. Gastroesophageal reflux disease without esophagitis    8. Medication side effects present, subsequent encounter    9. DDD (degenerative disc disease), lumbar    10. Trochanteric bursitis of right hip    11. Moderate episode of recurrent major depressive disorder ([address]    12. Primary insomnia    . She complains of pain in the  Low back, right hip   She rates the pain 7/10 and describes it as sharp, aching. Current treatment regimen has helped relieve about 30% of the pain. She denies any side effects from the current pain regimen. Patient reports that since the last follow up visit the physical functioning is unchanged, family/social relationships are unchanged, mood is unchanged sleep patterns are unchanged, and that the overall functioning is unchanged. Patient denies misusing/abusing her narcotic pain medications or using any illegal drugs. There are No indicators for possible drug abuse, addiction or diversion problems, patient states she has been hurting a lot. Patient says the pain is greater in the back than the legs. Ms. Shalini Galeano says she wants to get an injection. She states she is using Percocet along with the Neurontin and the other adjuvants. Patient denies any side effects. Patient denies any accident, injury or trauma. Patient reports she is managing light house chores. She says she has been off smoking. ALLERGIES: Patients list of allergies were reviewed     MEDICATIONS: Ms. Shalini Galeano list of medications were reviewed. Her current medications are   Outpatient Medications Prior to Visit Medication Sig Dispense Refill    prasugrel (EFFIENT) 10 MG TABS Take 1 tablet by mouth daily 90 tablet 3    pantoprazole (PROTONIX) 40 MG tablet Take 1 tablet by mouth daily 30 tablet 1    traZODone (DESYREL) 50 MG tablet Take 1-2 tablets by mouth nightly 60 tablet 1    oxyCODONE-acetaminophen (PERCOCET) 5-325 MG per tablet Take 1 tablet by mouth every 6 hours as needed for Pain (Max 4 per day) for up to 28 days. 112 tablet 0    ipratropium-albuterol (DUONEB) 0.5-2.5 (3) MG/3ML SOLN nebulizer solution Take 1 aerosol every 4-6 hours as needed for shortness of breath coughing and wheezing 360 mL 0    Fluticasone-Umeclidin-Vilant (TRELEGY ELLIPTA) 200-62.5-25 MCG/INH AEPB Inhale 1 puff into the lungs daily 1 each 11    DULoxetine (CYMBALTA) 60 MG extended release capsule TAKE 1 CAPSULE BY MOUTH TWICE A  capsule 0    alendronate (FOSAMAX) 70 MG tablet TAKE 1 TABLET BY MOUTH ONE TIME PER WEEK 12 tablet 1    aspirin 81 MG EC tablet Take 1 tablet by mouth in the morning. 30 tablet 3    albuterol sulfate  (90 Base) MCG/ACT inhaler Inhale 2 puffs into the lungs every 6 hours as needed for Shortness of Breath 1 each 11    Calcium Citrate-Vitamin D 500-500 MG-UNIT CHEW Take 1 tablet by mouth 2 times daily      ONE TOUCH LANCETS MISC 1 each by Does not apply route daily 100 each 3    Cholecalciferol (VITAMIN D) 2000 units TABS tablet Take 1 tablet by mouth daily 30 tablet     cetirizine (ZYRTEC) 10 MG tablet Take 10 mg by mouth daily as needed for Allergies      Nebulizers (COMPRESSOR/NEBULIZER) MISC 1 Units by Does not apply route 4 times daily 1 each 3    gabapentin (NEURONTIN) 600 MG tablet Take 1 tablet by mouth 2 times daily for 30 days. 60 tablet 1     No facility-administered medications prior to visit. REVIEW OF SYSTEMS:    Respiratory: Negative for apnea, chest tightness and shortness of breath or change in baseline breathing. PHYSICAL EXAM:   Nursing note and vitals reviewed.  /67 Pulse 69   Wt 130 lb (59 kg)   BMI 22.31 kg/m²   Constitutional: She appears well-developed and well-nourished. No acute distress. On Oxygen   Cardiovascular: Normal rate, regular rhythm, normal heart sounds, and does not have murmur. Pulmonary/Chest: Effort normal. No respiratory distress. She does not have wheezes in the lung fields. She has no rales. + decrease air exchange bilaterally     Neurological/Psychiatric:She is alert and oriented to person, place, and time. Coordination is  normal.  Her mood isAppropriate and affect is Neutral/Euthymic(normal) . Other:Back: +taunt bands with TPI's, decrease ROM      IMPRESSION:   1. Chronic pain syndrome    2. Lumbar spondylosis    3. Degeneration of lumbar or lumbosacral intervertebral disc    4. Fibromyalgia    5. Osteoarthritis of spine with radiculopathy, lumbar region    6. DDD (degenerative disc disease), lumbar    7. Trochanteric bursitis of right hip        PLAN:  Informed verbal consent was obtained  - [place] record was obtained and reviewed  for the last one year and no indicators of drug misuse  were found. Any other controlled substance prescriptions  seen on the record have been accounted for, I am aware of the patient receiving these medications. [facility] [place] record will be rechecked as part of office protocol. -ROM/Stretching exercises as advised   -She was advised to increase fluids ( 5-7  glasses of fluid daily), limit caffeine, avoid cheese products, increase dietary fiber, increase activity and exercise as tolerated and relax regularly and enjoy meals   -Informed verbal consent was obtained from the patient. Risks and benefits of the procedure were explained. Complications of the procedure and side effects of kenalog/ lidocaine were discussed with patient. Using 0.25% marcaine and 1cc of kenalog, the the tender trigger point areas in the  bilateral paraspinal lumbar muscles -erector spinae and longgissmus muscles were injected under aseptic condition. Mobilization attempted by stretching. Patient tolerated procedure well. Adv to apply ice today.   -Continue with Percocet 4 per day along with other adjuvants   Current Outpatient Medications   Medication Sig Dispense Refill    prasugrel (EFFIENT) 10 MG TABS Take 1 tablet by mouth daily 90 tablet 3    pantoprazole (PROTONIX) 40 MG tablet Take 1 tablet by mouth daily 30 tablet 1    traZODone (DESYREL) 50 MG tablet Take 1-2 tablets by mouth nightly 60 tablet 1    oxyCODONE-acetaminophen (PERCOCET) 5-325 MG per tablet Take 1 tablet by mouth every 6 hours as needed for Pain (Max 4 per day) for up to 28 days. 112 tablet 0    ipratropium-albuterol (DUONEB) 0.5-2.5 (3) MG/3ML SOLN nebulizer solution Take 1 aerosol every 4-6 hours as needed for shortness of breath coughing and wheezing 360 mL 0    Fluticasone-Umeclidin-Vilant (TRELEGY ELLIPTA) 200-62.5-25 MCG/INH AEPB Inhale 1 puff into the lungs daily 1 each 11    DULoxetine (CYMBALTA) 60 MG extended release capsule TAKE 1 CAPSULE BY MOUTH TWICE A  capsule 0    alendronate (FOSAMAX) 70 MG tablet TAKE 1 TABLET BY MOUTH ONE TIME PER WEEK 12 tablet 1    aspirin 81 MG EC tablet Take 1 tablet by mouth in the morning. 30 tablet 3    albuterol sulfate  (90 Base) MCG/ACT inhaler Inhale 2 puffs into the lungs every 6 hours as needed for Shortness of Breath 1 each 11    Calcium Citrate-Vitamin D 500-500 MG-UNIT CHEW Take 1 tablet by mouth 2 times daily      ONE TOUCH LANCETS MISC 1 each by Does not apply route daily 100 each 3    Cholecalciferol (VITAMIN D) 2000 units TABS tablet Take 1 tablet by mouth daily 30 tablet     cetirizine (ZYRTEC) 10 MG tablet Take 10 mg by mouth daily as needed for Allergies      Nebulizers (COMPRESSOR/NEBULIZER) MISC 1 Units by Does not apply route 4 times daily 1 each 3    gabapentin (NEURONTIN) 600 MG tablet Take 1 tablet by mouth 2 times daily for 30 days.  60 tablet 1     No current facility-administered medications for this visit.     I will continue her current medication regimen  which is part of the above treatment schedule. It has been helping with Ms. Dayna Bansal chronic  medical problems which for this visit include:   Diagnoses of Chronic pain syndrome, Lumbar spondylosis, Degeneration of lumbar or lumbosacral intervertebral disc, Muscle cramping, Fibromyalgia, Osteoarthritis of spine with radiculopathy, lumbar region, Gastroesophageal reflux disease without esophagitis, Medication side effects present, subsequent encounter, DDD (degenerative disc disease), lumbar, Trochanteric bursitis of right hip, Moderate episode of recurrent major depressive disorder ([address] and Primary insomnia were pertinent to this visit. Risks and benefits of the medications and other alternative treatments  including no treatment were discussed with the patient. The common side effects of these medications were also explained to the patient. Informed verbal consent was obtained. Goals of current treatment regimen include improvement in pain, restoration of functioning- with focus on improvement in physical performance, general activity, work or disability,emotional distress, health care utilization and  decreased medication consumption. Will continue to monitor progress towards achieving/maintaining therapeutic goals with special emphasis on  1. Improvement in perceived interfernce  of pain with ADL's. Ability to do home exercises independently. Ability to do household chores indoor and/or outdoor work and social and leisure activities. Improve psychosocial and physical functioning. - she is showing progression towards this treatment goal with the current regimen. She was advised against drinking alcohol with the narcotic pain medicines, advised against driving or handling machinery while adjusting the dose of medicines or if having cognitive  issues related to the current medications. Risk of overdose and death, if medicines not taken as prescribed, were also discussed. If the patient develops new symptoms or if the symptoms worsen, the patient should call the office. While transcribing every attempt was made to maintain the accuracy of the note in terms of it's contents,there may have been some errors made inadvertently. Thank you for allowing me to participate in the care of this patient.     Sharon Robison MD.    Cc: HECTOR Cole - CNP

## 2022-11-23 RX ORDER — OXYCODONE HYDROCHLORIDE AND ACETAMINOPHEN 5; 325 MG/1; MG/1
1 TABLET ORAL EVERY 6 HOURS PRN
Qty: 112 TABLET | Refills: 0 | Status: SHIPPED | OUTPATIENT
Start: 2022-11-23 | End: 2022-12-21

## 2022-11-23 RX ORDER — TRAZODONE HYDROCHLORIDE 50 MG/1
50-100 TABLET ORAL NIGHTLY
Qty: 60 TABLET | Refills: 1 | Status: SHIPPED | OUTPATIENT
Start: 2022-11-23

## 2022-11-23 RX ORDER — TRIAMCINOLONE ACETONIDE 40 MG/ML
40 INJECTION, SUSPENSION INTRA-ARTICULAR; INTRAMUSCULAR ONCE
Status: SHIPPED | OUTPATIENT
Start: 2022-11-23

## 2022-11-23 RX ORDER — GABAPENTIN 600 MG/1
600 TABLET ORAL 2 TIMES DAILY
Qty: 60 TABLET | Refills: 1 | Status: SHIPPED | OUTPATIENT
Start: 2022-11-23 | End: 2022-12-23

## 2022-11-23 RX ORDER — PANTOPRAZOLE SODIUM 40 MG/1
40 TABLET, DELAYED RELEASE ORAL DAILY
Qty: 30 TABLET | Refills: 1 | Status: SHIPPED | OUTPATIENT
Start: 2022-11-23

## 2022-12-09 ENCOUNTER — HOSPITAL ENCOUNTER (EMERGENCY)
Age: 77
Discharge: HOME | End: 2022-12-09
Payer: MEDICARE

## 2022-12-09 VITALS
BODY MASS INDEX: 21.08 KG/M2 | WEIGHT: 126.54 LBS | RESPIRATION RATE: 16 BRPM | DIASTOLIC BLOOD PRESSURE: 95 MMHG | HEIGHT: 65 IN | OXYGEN SATURATION: 97 % | SYSTOLIC BLOOD PRESSURE: 155 MMHG | HEART RATE: 96 BPM | TEMPERATURE: 97.1 F

## 2022-12-09 DIAGNOSIS — R04.0 RECURRENT EPISTAXIS: Primary | ICD-10-CM

## 2022-12-09 PROCEDURE — 6370000000 HC RX 637 (ALT 250 FOR IP): Performed by: GENERAL ACUTE CARE HOSPITAL

## 2022-12-09 PROCEDURE — 99283 EMERGENCY DEPT VISIT LOW MDM: CPT

## 2022-12-09 PROCEDURE — 30901 CONTROL OF NOSEBLEED: CPT

## 2022-12-09 RX ORDER — ONDANSETRON 4 MG/1
4 TABLET, ORALLY DISINTEGRATING ORAL ONCE
Status: DISCONTINUED | OUTPATIENT
Start: 2022-12-09 | End: 2022-12-09 | Stop reason: HOSPADM

## 2022-12-09 RX ORDER — OXYCODONE HYDROCHLORIDE AND ACETAMINOPHEN 5; 325 MG/1; MG/1
1 TABLET ORAL ONCE
Status: COMPLETED | OUTPATIENT
Start: 2022-12-09 | End: 2022-12-09

## 2022-12-09 RX ORDER — OXYMETAZOLINE HYDROCHLORIDE 0.05 G/100ML
2 SPRAY NASAL ONCE
Status: COMPLETED | OUTPATIENT
Start: 2022-12-09 | End: 2022-12-09

## 2022-12-09 RX ADMIN — OXYCODONE AND ACETAMINOPHEN 1 TABLET: 5; 325 TABLET ORAL at 14:22

## 2022-12-09 RX ADMIN — OXYMETAZOLINE HCL 2 SPRAY: 0.05 SPRAY NASAL at 13:56

## 2022-12-09 ASSESSMENT — PAIN - FUNCTIONAL ASSESSMENT: PAIN_FUNCTIONAL_ASSESSMENT: NONE - DENIES PAIN

## 2022-12-09 NOTE — ED NOTES
Discharge and education instructions reviewed. Patient verbalized understanding, teach-back successful. Patient denied questions at this time. No acute distress noted. Patient instructed to follow-up as noted - return to emergency department if symptoms worsen. Patient verbalized understanding. Discharged per EDMD with discharge instructions.           Donal Miner RN  12/09/22 [id number]

## 2022-12-09 NOTE — ED PROVIDER NOTES
[address] [facility]        Pt Name: Hansa Callahan  MRN: [id number]  [place] 1945  Date of evaluation: 12/9/2022  Provider: HECTOR Griffin CNP  PCP: HECTOR Berman CNP  Note Started: 2:19 PM EST 12/9/22          KANDICE. I have evaluated this patient. My supervising physician was available for consultation. CHIEF COMPLAINT       Chief Complaint   Patient presents with    Epistaxis     Nose bleed x3 hours. HISTORY OF PRESENT ILLNESS   (Location, Timing/Onset, Context/Setting, Quality, Duration, Modifying Factors, Severity, Associated Signs and Symptoms)  Note limiting factors. Chief Complaint: Nosebleed    Hansa Callahan is a 78 y.o. female who presents to the emergency department today reporting a nosebleed which began approximately 3 hours ago. Patient states that lately she has been getting frequent nosebleeds. She takes a daily aspirin. She does not have a ENT specialist but states that during a recent admission several weeks ago she had a nosebleed which required packing. She denies nasal trauma. She denies headache, chest pain, trouble breathing. She denies fever, chills, or other symptoms. Nursing Notes were all reviewed and agreed with or any disagreements were addressed in the HPI. REVIEW OF SYSTEMS    (2-9 systems for level 4, 10 or more for level 5)     Review of Systems   Constitutional:  Negative for chills, fatigue and fever. HENT:  Positive for nosebleeds. Negative for congestion, sore throat and voice change. Eyes:  Negative for visual disturbance. Respiratory:  Negative for cough, chest tightness, shortness of breath and wheezing. Cardiovascular:  Negative for chest pain and palpitations. Gastrointestinal:  Negative for abdominal pain, nausea and vomiting. Endocrine: Negative for polydipsia and polyuria.    Genitourinary:  Negative for difficulty urinating, dysuria and flank pain. Musculoskeletal:  Positive for back pain. Negative for neck pain and neck stiffness. Reports history of chronic back pain   Skin:  Negative for rash and wound. Allergic/Immunologic: Negative for immunocompromised state. Neurological:  Negative for dizziness, weakness and light-headedness. Hematological:  Does not bruise/bleed easily. Psychiatric/Behavioral:  Negative for sleep disturbance and suicidal ideas. The patient is nervous/anxious. Positives and Pertinent negatives as per HPI. Except as noted above in the ROS, all other systems were reviewed and negative. PAST MEDICAL HISTORY     Past Medical History:   Diagnosis Date    CAD (coronary artery disease) 07/19/2022    NSTEMI, PCI LCx    Chronic pain syndrome     Percocet. Dr. 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 PCI/stent.     DIAGNOSTIC CARDIAC CATH LAB PROCEDURE Left 07/19/2022    INTRACAPSULAR CATARACT EXTRACTION Left 06/23/2020    Phacoemulsification with intraocular lens implant performed by Trudi Gillis MD at [address] Right 07/14/2020    Phacoemulsification with intraocular lens implant performed by Trudi Gillis MD at [address]      patient denies          [address]       Discharge Medication List as of 12/9/2022  2:34 PM        CONTINUE these medications which have NOT CHANGED    Details   pantoprazole (PROTONIX) 40 MG tablet Take 1 tablet by mouth daily, Disp-30 tablet, R-1Normal      traZODone (DESYREL) 50 MG tablet Take 1-2 tablets by mouth nightly, Disp-60 tablet, R-1Normal      oxyCODONE-acetaminophen (PERCOCET) 5-325 MG per tablet Take 1 tablet by mouth every 6 hours as needed for Pain (Max 4 per day) for up to 28 days. , Disp-112 tablet, R-0Normal      gabapentin (NEURONTIN) 600 MG tablet Take 1 tablet by mouth 2 times daily for 30 days. , Disp-60 tablet, R-1Normal      prasugrel (EFFIENT) 10 MG TABS Take 1 tablet by mouth daily, Disp-90 tablet, R-3Normal      ipratropium-albuterol (DUONEB) 0.5-2.5 (3) MG/3ML SOLN nebulizer solution Take 1 aerosol every 4-6 hours as needed for shortness of breath coughing and wheezing, Disp-360 mL, R-0Normal      Fluticasone-Umeclidin-Vilant (TRELEGY ELLIPTA) 200-62.5-25 MCG/INH AEPB Inhale 1 puff into the lungs daily, Disp-1 each, R-11Print      DULoxetine (CYMBALTA) 60 MG extended release capsule TAKE 1 CAPSULE BY MOUTH TWICE A DAY, Disp-180 capsule, R-0Normal      alendronate (FOSAMAX) 70 MG tablet TAKE 1 TABLET BY MOUTH ONE TIME PER WEEK, Disp-12 tablet, R-1Normal      aspirin 81 MG EC tablet Take 1 tablet by mouth in the morning., Disp-30 tablet, R-3Print      albuterol sulfate  (90 Base) MCG/ACT inhaler Inhale 2 puffs into the lungs every 6 hours as needed for Shortness of Breath, Disp-1 each, R-11Normal      Calcium Citrate-Vitamin D 500-500 MG-UNIT CHEW Take 1 tablet by mouth 2 times dailyOTC      ONE TOUCH LANCETS MISC DAILY Starting Fri 2020, Disp-100 each, R-3, Normal      Cholecalciferol (VITAMIN D) 2000 units TABS tablet Take 1 tablet by mouth daily, Disp-30 tabletOTC      cetirizine (ZYRTEC) 10 MG tablet Take 10 mg by mouth daily as needed for AllergiesHistorical Med      Nebulizers (COMPRESSOR/NEBULIZER) MISC 4 TIMES DAILY Starting Sat 3/30/2019, Disp-1 each, R-3, Print               ALLERGIES     Patient has no known allergies. FAMILYHISTORY       Family History   Problem Relation Age of Onset    Cancer Father         COLON     Alzheimer's Disease Mother     Heart Disease Brother     Heart Failure Brother     Diabetes Daughter     Diabetes Daughter     Diabetes Daughter           SOCIAL HISTORY       Social History     Tobacco Use    Smoking status: Former     Packs/day: 1.00     Years: 55.00     Pack years: 55.00     Types: Cigarettes     Start date: 1962     Quit date: 7/3/2022     Years since quittin.4    Smokeless tobacco: Never    Tobacco comments:     Quit in July   Vaping Use    Vaping Use: Never used   Substance Use Topics    Alcohol use: No     Alcohol/week: 0.0 standard drinks    Drug use: No       SCREENINGS    Meherrin Coma Scale  Eye Opening: Spontaneous  Best Verbal Response: Oriented  Best Motor Response: Obeys commands  Meherrin Coma Scale Score: 15        PHYSICAL EXAM    (up to 7 for level 4, 8 or more for level 5)     ED Triage Vitals [22 1355]   BP Temp Temp Source Heart Rate Resp SpO2 Height Weight   -- 97.1 °F (36.2 °C) Oral (!) 120 18 97 % 5' 5\" (1.651 m) 126 lb 8.7 oz (57.4 kg)       Physical Exam  Vitals and nursing note reviewed. Constitutional:       General: She is not in acute distress. Appearance: Normal appearance. She is not ill-appearing. HENT:      Head: Normocephalic and atraumatic.       Right Ear: Tympanic membrane, ear canal and external ear normal.      Left Ear: Tympanic membrane, ear canal and external ear normal.      Nose: No nasal deformity, septal deviation, signs of injury or nasal tenderness. Right Nostril: Epistaxis present. No septal hematoma or occlusion. Left Nostril: No epistaxis, septal hematoma or occlusion. Right Sinus: No maxillary sinus tenderness or frontal sinus tenderness. Left Sinus: No maxillary sinus tenderness or frontal sinus tenderness. Mouth/Throat:      Mouth: Mucous membranes are moist.      Pharynx: Oropharynx is clear. Eyes:      General:         Right eye: No discharge. Left eye: No discharge. Extraocular Movements: Extraocular movements intact. Conjunctiva/sclera: Conjunctivae normal.      Pupils: Pupils are equal, round, and reactive to light. Cardiovascular:      Rate and Rhythm: Normal rate and regular rhythm. Pulses: Normal pulses. Heart sounds: Normal heart sounds. Pulmonary:      Effort: Pulmonary effort is normal. No respiratory distress. Breath sounds: Normal breath sounds. Abdominal:      General: Bowel sounds are normal.      Palpations: Abdomen is soft. Tenderness: There is no abdominal tenderness. There is no right CVA tenderness or left CVA tenderness. Musculoskeletal:         General: Normal range of motion. Cervical back: Normal range of motion and neck supple. Right lower leg: No edema. Left lower leg: No edema. Skin:     General: Skin is warm and dry. Capillary Refill: Capillary refill takes less than 2 seconds. Neurological:      General: No focal deficit present. Mental Status: She is alert and oriented to person, place, and time. Psychiatric:         Mood and Affect: Mood normal.         Behavior: Behavior normal.         Thought Content: Thought content normal.         Judgment: Judgment normal.       DIAGNOSTIC RESULTS   LABS:    Labs Reviewed - No data to display    When ordered only abnormal lab results are displayed. All other labs were within normal range or not returned as of this dictation. EKG:  When ordered, EKG's are interpreted by the Emergency Department Physician in the absence of a cardiologist.  Please see their note for interpretation of EKG. RADIOLOGY:   Non-plain film images such as CT, Ultrasound and MRI are read by the radiologist. Plain radiographic images are visualized and preliminarily interpreted by the ED Provider with the below findings:        Interpretation per the Radiologist below, if available at the time of this note:    No orders to display     No results found. PROCEDURES   Unless otherwise noted below, none     Epistaxis Mgmt    Date/Time: 12/9/2022 2:02 PM  Performed by: HECTOR Macdonald CNP  Authorized by:  HECTOR Macdonald CNP     Consent:     Consent obtained:  Verbal    Consent given by:  Patient    Risks, benefits, and alternatives were discussed: yes      Risks discussed:  Bleeding, infection, nasal injury and pain    Alternatives discussed:  No treatment and alternative treatment  Universal protocol:     Patient identity confirmed:  Verbally with patient and arm band  Anesthesia:     Anesthesia method:  None  Procedure details:     Treatment site:  Unable to specify    Treatment method:  Merocel sponge    Treatment complexity:  Limited    Treatment episode: recurring    Post-procedure details:     Assessment:  Bleeding stopped    Procedure completion:  Tolerated well, no immediate complications    CRITICAL CARE TIME   none    CONSULTS:  None      EMERGENCY DEPARTMENT COURSE and DIFFERENTIAL DIAGNOSIS/MDM:   Vitals:    Vitals:    12/09/22 1355 12/09/22 1415   BP:  (!) 155/95   Pulse: (!) 120 96   Resp: 18 16   Temp: 97.1 °F (36.2 °C)    TempSrc: Oral    SpO2: 97% 97%   Weight: 126 lb 8.7 oz (57.4 kg)    Height: 5' 5\" (1.651 m)        Patient was given the following medications:  Medications   oxymetazoline (AFRIN) 0.05 % nasal spray 2 spray (2 sprays Each Nostril Given by Other 12/9/22 1356)   oxyCODONE-acetaminophen (PERCOCET) 5-325 MG per tablet 1 tablet (1 tablet Oral Given 12/9/22 1422)         Is this patient to be included in the SEP-1 Core Measure due to severe sepsis or septic shock? No   Exclusion criteria - the patient is NOT to be included for SEP-1 Core Measure due to: Infection is not suspected    Previous records reviewed in order to gain further information regarding patient's PMH is well as her HPI. Nursing notes reviewed. This is a 79-year-old female who presents to the emergency department today reporting a nosebleed. Symptoms began 3 hours prior to arrival.  Physical exam complete. Patient arrives nontoxic, afebrile, mildly hypertensive. She does have active bleeding from the right naris noted upon arrival.  Unable to visualize exact location of bleed. Afrin nasal spray applied followed by insertion of Merocel sponge. Patient tolerated this procedure well, see above report. Patient was monitored in the emergency department for a period of 2 hours. She has been without any further episodes of epistaxis while here. At this time there is no evidence of any life-threatening or emergent conditions requiring immediate intervention. Patient is advised to leave packing in place until seen by ENT specialist.  Urgent ENT referral is provided. She agrees to follow-up as directed. She agrees return for high fever, sudden vomiting severe pain, uncontrolled bleeding, or worsening symptoms. She is discharged home in stable condition. FINAL IMPRESSION      1.  Recurrent epistaxis          DISPOSITION/PLAN   DISPOSITION Decision To Discharge 12/09/2022 02:30:52 PM      PATIENT REFERRED TO:  Adalid Ferreira APRN - CNP  [address] [address]  [phone]    In 3 days      [facility]  [address] [place] Dianne Dewitt [address]        DISCHARGE MEDICATIONS:  Discharge Medication List as of 12/9/2022  2:34 PM          DISCONTINUED MEDICATIONS:  Discharge Medication List as of 12/9/2022  2:34 PM        STOP taking these medications       atorvastatin (LIPITOR) 80 MG tablet Comments:   Reason for Stopping:         meloxicam (MOBIC) 15 MG tablet Comments:   Reason for Stopping:                      (Please note that portions of this note were completed with a voice recognition program.  Efforts were made to edit the dictations but occasionally words are mis-transcribed.)    HECTOR Humphrey CNP (electronically signed)            HECTOR Humphrey CNP  12/11/22 2351

## 2022-12-09 NOTE — DISCHARGE INSTRUCTIONS
Leave marks all sponge in place until seen by ENT. You should receive a phone call today or Monday to establish follow-up appointment with ear nose and throat specialist.  Continue taking your current medications as directed. Return for high fever, incessant vomiting, severe pain, uncontrolled bleeding, or any other worsening symptoms.

## 2022-12-11 ASSESSMENT — ENCOUNTER SYMPTOMS
BACK PAIN: 1
VOMITING: 0
SORE THROAT: 0
ABDOMINAL PAIN: 0
WHEEZING: 0
NAUSEA: 0
COUGH: 0
VOICE CHANGE: 0
CHEST TIGHTNESS: 0
SHORTNESS OF BREATH: 0

## 2022-12-12 ENCOUNTER — OFFICE VISIT (OUTPATIENT)
Dept: PULMONOLOGY | Age: 77
End: 2022-12-12
Payer: MEDICARE

## 2022-12-12 ENCOUNTER — OFFICE VISIT (OUTPATIENT)
Dept: ENT CLINIC | Age: 77
End: 2022-12-12
Payer: MEDICARE

## 2022-12-12 VITALS
BODY MASS INDEX: 21.33 KG/M2 | HEIGHT: 65 IN | HEART RATE: 114 BPM | SYSTOLIC BLOOD PRESSURE: 112 MMHG | DIASTOLIC BLOOD PRESSURE: 74 MMHG | WEIGHT: 128 LBS

## 2022-12-12 VITALS
SYSTOLIC BLOOD PRESSURE: 134 MMHG | TEMPERATURE: 97.1 F | WEIGHT: 128.2 LBS | DIASTOLIC BLOOD PRESSURE: 80 MMHG | RESPIRATION RATE: 16 BRPM | HEIGHT: 65 IN | HEART RATE: 104 BPM | BODY MASS INDEX: 21.36 KG/M2 | OXYGEN SATURATION: 98 %

## 2022-12-12 DIAGNOSIS — R04.0 EPISTAXIS: ICD-10-CM

## 2022-12-12 DIAGNOSIS — J44.9 COPD, SEVERE (HCC): ICD-10-CM

## 2022-12-12 DIAGNOSIS — J34.2 DEVIATED SEPTUM: ICD-10-CM

## 2022-12-12 DIAGNOSIS — J96.11 CHRONIC HYPOXEMIC RESPIRATORY FAILURE (HCC): ICD-10-CM

## 2022-12-12 DIAGNOSIS — Z92.29 HX OF LONG TERM USE OF BLOOD THINNERS: ICD-10-CM

## 2022-12-12 DIAGNOSIS — R04.0 EPISTAXIS: Primary | ICD-10-CM

## 2022-12-12 PROCEDURE — G8399 PT W/DXA RESULTS DOCUMENT: HCPCS | Performed by: OTOLARYNGOLOGY

## 2022-12-12 PROCEDURE — 99213 OFFICE O/P EST LOW 20 MIN: CPT | Performed by: OTOLARYNGOLOGY

## 2022-12-12 PROCEDURE — 1123F ACP DISCUSS/DSCN MKR DOCD: CPT | Performed by: INTERNAL MEDICINE

## 2022-12-12 PROCEDURE — 1036F TOBACCO NON-USER: CPT | Performed by: INTERNAL MEDICINE

## 2022-12-12 PROCEDURE — G8484 FLU IMMUNIZE NO ADMIN: HCPCS | Performed by: OTOLARYNGOLOGY

## 2022-12-12 PROCEDURE — 1090F PRES/ABSN URINE INCON ASSESS: CPT | Performed by: INTERNAL MEDICINE

## 2022-12-12 PROCEDURE — G8420 CALC BMI NORM PARAMETERS: HCPCS | Performed by: OTOLARYNGOLOGY

## 2022-12-12 PROCEDURE — 1123F ACP DISCUSS/DSCN MKR DOCD: CPT | Performed by: OTOLARYNGOLOGY

## 2022-12-12 PROCEDURE — 3023F SPIROM DOC REV: CPT | Performed by: INTERNAL MEDICINE

## 2022-12-12 PROCEDURE — 1036F TOBACCO NON-USER: CPT | Performed by: OTOLARYNGOLOGY

## 2022-12-12 PROCEDURE — 99214 OFFICE O/P EST MOD 30 MIN: CPT | Performed by: INTERNAL MEDICINE

## 2022-12-12 PROCEDURE — G8427 DOCREV CUR MEDS BY ELIG CLIN: HCPCS | Performed by: INTERNAL MEDICINE

## 2022-12-12 PROCEDURE — 3017F COLORECTAL CA SCREEN DOC REV: CPT | Performed by: INTERNAL MEDICINE

## 2022-12-12 PROCEDURE — 31238 NSL/SINS NDSC SRG NSL HEMRRG: CPT | Performed by: OTOLARYNGOLOGY

## 2022-12-12 PROCEDURE — G8420 CALC BMI NORM PARAMETERS: HCPCS | Performed by: INTERNAL MEDICINE

## 2022-12-12 PROCEDURE — G8427 DOCREV CUR MEDS BY ELIG CLIN: HCPCS | Performed by: OTOLARYNGOLOGY

## 2022-12-12 PROCEDURE — G8399 PT W/DXA RESULTS DOCUMENT: HCPCS | Performed by: INTERNAL MEDICINE

## 2022-12-12 PROCEDURE — G8484 FLU IMMUNIZE NO ADMIN: HCPCS | Performed by: INTERNAL MEDICINE

## 2022-12-12 PROCEDURE — 3017F COLORECTAL CA SCREEN DOC REV: CPT | Performed by: OTOLARYNGOLOGY

## 2022-12-12 PROCEDURE — 1090F PRES/ABSN URINE INCON ASSESS: CPT | Performed by: OTOLARYNGOLOGY

## 2022-12-12 RX ORDER — AMOXICILLIN AND CLAVULANATE POTASSIUM 875; 125 MG/1; MG/1
1 TABLET, FILM COATED ORAL 2 TIMES DAILY
Qty: 10 TABLET | Refills: 0 | Status: SHIPPED | OUTPATIENT
Start: 2022-12-12 | End: 2022-12-17

## 2022-12-12 NOTE — ASSESSMENT & PLAN NOTE
Had recent nose bleed. Discussed possible contribution of dry oxygen. Discussed humidifier that can be purchased from [facility]. Will get overnight pulse ox to assess if o2 still needed.

## 2022-12-12 NOTE — PATIENT INSTRUCTIONS
Over night pulse ox on room air       If still need oxygen, will get humidifer.        \"humidifier for oxygen\"

## 2022-12-12 NOTE — PROGRESS NOTES
REASON FOR CONSULTATION/CC: COPD          PCP: Rosaura Marin, HECTOR - CNP    HISTORY OF PRESENT ILLNESS: Lucio Rasheed is a 78y.o. year old female with a history of COPD who presents :           COPD Assessment Test (CAT)                                       COPD    Trelegy    Duoneb's  using 2-3 times per day     Chronic hypoxemia  Currently < 2 L NC ~ 1.5 . Not using as much with nose bleed. Abnormal radiograph         Nose bleed  Has rhinorocket      Fleischner Society Recommendations:         Objective:   PHYSICAL EXAM:  Blood pressure 134/80, pulse (!) 104, temperature 97.1 °F (36.2 °C), temperature source Infrared, resp. rate 16, height 5' 5\" (1.651 m), weight 128 lb 3.2 oz (58.2 kg), SpO2 98 %, not currently breastfeeding.'    Gen: No distress. Eyes:    ENT:  Rhinorocket  Neck:    Resp: No accessory muscle use. No crackles. no wheezes, not musical. No rhonchi. CV: Regular rate. Regular rhythm. No murmur or rub. No edema. GI:    Skin:    Lymph:    M/S: No cyanosis. No clubbing. Neuro: Moves all four extremities. Psych: Oriented x 3. No anxiety. Awake. Alert. Intact judgement and insight. Data Reviewed:        Assessment:     COPD, Moderately severe FEV1 59%  Tobacco abuse. Pulmonary nodules, new pulmonary nodule 2022  Right upper lobe Pseudomonas pneumonia with abscess with substantial fibrosis and right upper lobe collapse leading to restriction    Plan:        Problem List Items Addressed This Visit       Epistaxis     Seeing ENT today. rhinorocket in place    See chronic hypoxemia. COPD, severe ([address]      Trelegy with Duoneb's           Chronic hypoxemic respiratory failure ([address]     Had recent nose bleed. Discussed possible contribution of dry oxygen. Discussed humidifier that can be purchased from [employer]. Will get overnight pulse ox to assess if o2 still needed. This note was transcribed using [address]. Please disregard any translational errors.

## 2022-12-12 NOTE — PROGRESS NOTES
[address] SURGERY  Follow up      Patient Name: Ning Monterroso  Medical Record Number:  [id number]  Primary Care Physician:  HECTOR Douglas - CNP  Date of Consultation: 12/12/2022    Chief Complaint: Epistaxis        Interval History    Patient following up for her nosebleeds. I saw her when she was in the ICU in August and had to pack her nose aggressively. Patient does not really recall much of this. After being discharged she had a few minor nosebleeds on the right side that she was able to get stopped until late last week she had a very severe bleed. This required packing in the emergency room. She still on the Effient. She admits that she did not take it for a couple of days. No bleeding since the packing was placed          REVIEW OF SYSTEMS  As above    PHYSICAL EXAM  GENERAL: No Acute Distress, Alert and Oriented, no Hoarseness, strong voice  EYES: EOMI, Anti-icteric  HENT:   Head: Normocephalic and atraumatic. Face:  Symmetric, facial nerve intact, no sinus tenderness  Right Ear: Normal external ear  Left Ear: Normal external ear,   Mouth/Oral Cavity:  normal lips, Uvula is midline, no mucosal lesions, no trismus  Oropharynx/Larynx:  normal oropharynx,   Nose:Normal external nasal appearance. See below  NECK: Normal range of motion, no thyromegaly, trachea is midline, no lymphadenopathy, no neck masses, no crepitus        PROCEDURE    Nasal endoscopy with control of epistaxis  I applied Afrin and lidocaine to the Merocel in the right nasal cavity. I slowly removed it. Afrin and lidocaine were then placed in the bilateral nasal cavity. A rigid scope was used to visualize the bilateral nasal cavity. On the left side there was no masses or lesions. On the right side she has a fairly severe rightward septal deviation that gets worse posteriorly in the bony septum.   She had some mild oozing of the inferior turbinate, middle turbinate and adjacent septum. I cauterized this area with silver nitrate and then covered it with Surgicel      ASSESSMENT/PLAN  1. Epistaxis  Severe bleeding from the right side. I suspect this is related to the deviated septum and her blood thinners. I would like for her to use nasal saline to keep the nose moist.  I will to see her in a couple of weeks. If she has a bad nosebleed during the day I told her to call my office and I will get her in. I am also giving her a short course of Augmentin as she had the Merocel packing in place without antibiotics. This predisposes her to a sinus infection    2. Deviated septum  Long-term if she continues to have bleeding we may have to consider a septoplasty and sphenopalatine artery ligation. She just had a stent placed in August so we will try to avoid surgery where we have to stop the blood thinner, however if she has significant bleeding requiring aggressive packing we may have to proceed. 3. Hx of long term use of blood thinners  As above         The patient was leaving she developed another right-sided nosebleed. Nasal endoscopy showed the Surgicel. I removed this. The bleeding seemed to be coming from posterior to the severe septal deviation. This made it very difficult to see the exact location. Cut several thin strips of Nasopore and placed them posterior to the deviated septum and packed the posterior nasal cavity with it. I placed another piece over top of the anterior aspect of the deviated septum. This seemed to control the bleeding again. I have performed a head and neck physical exam personally or was physically present during the key or critical portions of the service. This note was generated completely or in part utilizing Dragon dictation speech recognition software. Occasionally, words are mistranscribed and despite editing, the text may contain inaccuracies due to incorrect word recognition.   If further clarification is needed please contact the office at [phone].

## 2022-12-20 ENCOUNTER — OFFICE VISIT (OUTPATIENT)
Dept: PAIN MANAGEMENT | Age: 77
End: 2022-12-20
Payer: MEDICARE

## 2022-12-20 VITALS
DIASTOLIC BLOOD PRESSURE: 73 MMHG | HEART RATE: 75 BPM | SYSTOLIC BLOOD PRESSURE: 139 MMHG | BODY MASS INDEX: 21.97 KG/M2 | WEIGHT: 132 LBS

## 2022-12-20 DIAGNOSIS — M79.7 FIBROMYALGIA: ICD-10-CM

## 2022-12-20 DIAGNOSIS — M51.37 DEGENERATION OF LUMBAR OR LUMBOSACRAL INTERVERTEBRAL DISC: ICD-10-CM

## 2022-12-20 DIAGNOSIS — M70.61 TROCHANTERIC BURSITIS OF RIGHT HIP: ICD-10-CM

## 2022-12-20 DIAGNOSIS — G89.4 CHRONIC PAIN SYNDROME: ICD-10-CM

## 2022-12-20 DIAGNOSIS — M51.36 DDD (DEGENERATIVE DISC DISEASE), LUMBAR: ICD-10-CM

## 2022-12-20 DIAGNOSIS — M47.26 OSTEOARTHRITIS OF SPINE WITH RADICULOPATHY, LUMBAR REGION: ICD-10-CM

## 2022-12-20 DIAGNOSIS — M47.816 LUMBAR SPONDYLOSIS: ICD-10-CM

## 2022-12-20 PROCEDURE — 1123F ACP DISCUSS/DSCN MKR DOCD: CPT | Performed by: INTERNAL MEDICINE

## 2022-12-20 PROCEDURE — 3017F COLORECTAL CA SCREEN DOC REV: CPT | Performed by: INTERNAL MEDICINE

## 2022-12-20 PROCEDURE — G8484 FLU IMMUNIZE NO ADMIN: HCPCS | Performed by: INTERNAL MEDICINE

## 2022-12-20 PROCEDURE — 99213 OFFICE O/P EST LOW 20 MIN: CPT | Performed by: INTERNAL MEDICINE

## 2022-12-20 PROCEDURE — 1036F TOBACCO NON-USER: CPT | Performed by: INTERNAL MEDICINE

## 2022-12-20 PROCEDURE — 1090F PRES/ABSN URINE INCON ASSESS: CPT | Performed by: INTERNAL MEDICINE

## 2022-12-20 PROCEDURE — G8420 CALC BMI NORM PARAMETERS: HCPCS | Performed by: INTERNAL MEDICINE

## 2022-12-20 PROCEDURE — G8399 PT W/DXA RESULTS DOCUMENT: HCPCS | Performed by: INTERNAL MEDICINE

## 2022-12-20 PROCEDURE — G8427 DOCREV CUR MEDS BY ELIG CLIN: HCPCS | Performed by: INTERNAL MEDICINE

## 2022-12-20 NOTE — PROGRESS NOTES
Bo Marquez  1945  [id number]      HISTORY OF PRESENT ILLNESS:  Ms. Servando Caputo is a 78 y.o. female returns for a follow up visit for pain management  She has a diagnosis of   1. Chronic pain syndrome    2. Lumbar spondylosis    3. Degeneration of lumbar or lumbosacral intervertebral disc    4. Fibromyalgia    5. Osteoarthritis of spine with radiculopathy, lumbar region    6. DDD (degenerative disc disease), lumbar    7. Trochanteric bursitis of right hip    8. Moderate episode of recurrent major depressive disorder ([address]    9. Gastroesophageal reflux disease without esophagitis    10. Primary insomnia    . She complains of pain in the  Low back,   She rates the pain 5/10 and describes it as sharp, aching. Current treatment regimen has helped relieve about 50% of the pain. She denies any side effects from the current pain regimen. Patient reports that since the last follow up visit the physical functioning is unchanged, family/social relationships are unchanged, mood is unchanged sleep patterns are unchanged, and that the overall functioning is unchanged. Patient denies misusing/abusing her narcotic pain medications or using any illegal drugs. There are No indicators for possible drug abuse, addiction or diversion problems. Patient complains she has been sore in the back but not much in the legs. She says she is using Percocet 4 per day along with other adjuvants. She denies any constipation symptoms. She reports she is using Neurontin still     ALLERGIES: Patients list of allergies were reviewed     MEDICATIONS: Ms. Servando Caputo list of medications were reviewed. Her current medications are   Outpatient Medications Prior to Visit   Medication Sig Dispense Refill    pantoprazole (PROTONIX) 40 MG tablet Take 1 tablet by mouth daily 30 tablet 1    traZODone (DESYREL) 50 MG tablet Take 1-2 tablets by mouth nightly 60 tablet 1    oxyCODONE-acetaminophen (PERCOCET) 5-325 MG per tablet Take 1 tablet by mouth every 6 hours as needed for Pain (Max 4 per day) for up to 28 days. 112 tablet 0    gabapentin (NEURONTIN) 600 MG tablet Take 1 tablet by mouth 2 times daily for 30 days. 60 tablet 1    prasugrel (EFFIENT) 10 MG TABS Take 1 tablet by mouth daily 90 tablet 3    ipratropium-albuterol (DUONEB) 0.5-2.5 (3) MG/3ML SOLN nebulizer solution Take 1 aerosol every 4-6 hours as needed for shortness of breath coughing and wheezing 360 mL 0    Fluticasone-Umeclidin-Vilant (TRELEGY ELLIPTA) 200-62.5-25 MCG/INH AEPB Inhale 1 puff into the lungs daily 1 each 11    DULoxetine (CYMBALTA) 60 MG extended release capsule TAKE 1 CAPSULE BY MOUTH TWICE A  capsule 0    alendronate (FOSAMAX) 70 MG tablet TAKE 1 TABLET BY MOUTH ONE TIME PER WEEK 12 tablet 1    aspirin 81 MG EC tablet Take 1 tablet by mouth in the morning. 30 tablet 3    albuterol sulfate  (90 Base) MCG/ACT inhaler Inhale 2 puffs into the lungs every 6 hours as needed for Shortness of Breath 1 each 11    Calcium Citrate-Vitamin D 500-500 MG-UNIT CHEW Take 1 tablet by mouth 2 times daily      ONE TOUCH LANCETS MISC 1 each by Does not apply route daily 100 each 3    Cholecalciferol (VITAMIN D) 2000 units TABS tablet Take 1 tablet by mouth daily 30 tablet     cetirizine (ZYRTEC) 10 MG tablet Take 10 mg by mouth daily as needed for Allergies      Nebulizers (COMPRESSOR/NEBULIZER) MISC 1 Units by Does not apply route 4 times daily 1 each 3     Facility-Administered Medications Prior to Visit   Medication Dose Route Frequency Provider Last Rate Last Admin    triamcinolone acetonide (KENALOG-40) injection 40 mg  40 mg IntraMUSCular Once Marissa Sprague MD            REVIEW OF SYSTEMS:    Respiratory: Negative for apnea, chest tightness and shortness of breath or change in baseline breathing. PHYSICAL EXAM:   Nursing note and vitals reviewed. /73   Pulse 75   Wt 132 lb (59.9 kg)   BMI 21.97 kg/m²   Constitutional: She appears well-developed and well-nourished.  No acute distress. Cardiovascular: Normal rate, regular rhythm, normal heart sounds, and does not have murmur. Pulmonary/Chest: Effort normal. No respiratory distress. She haswheezes in the lung fields. She has no rales. + Rhonchi   Neurological/Psychiatric:She is alert and oriented to person, place, and time. Coordination is  normal.  Her mood isAppropriate and affect is Neutral/Euthymic(normal) . IMPRESSION:   1. Chronic pain syndrome    2. Lumbar spondylosis    3. Degeneration of lumbar or lumbosacral intervertebral disc    4. Fibromyalgia    5. Osteoarthritis of spine with radiculopathy, lumbar region    6. DDD (degenerative disc disease), lumbar    7. Trochanteric bursitis of right hip        PLAN:  Informed verbal consent was obtained  -ROM/Stretching exercises as advised   -She was advised to increase fluids ( 5-7  glasses of fluid daily), limit caffeine, avoid cheese products, increase dietary fiber, increase activity and exercise as tolerated and relax regularly and enjoy meals   -Continue with Percocet 4 per day   -Continue with all other adjuvants as before    Current Outpatient Medications   Medication Sig Dispense Refill    pantoprazole (PROTONIX) 40 MG tablet Take 1 tablet by mouth daily 30 tablet 1    traZODone (DESYREL) 50 MG tablet Take 1-2 tablets by mouth nightly 60 tablet 1    oxyCODONE-acetaminophen (PERCOCET) 5-325 MG per tablet Take 1 tablet by mouth every 6 hours as needed for Pain (Max 4 per day) for up to 28 days. 112 tablet 0    gabapentin (NEURONTIN) 600 MG tablet Take 1 tablet by mouth 2 times daily for 30 days.  60 tablet 1    prasugrel (EFFIENT) 10 MG TABS Take 1 tablet by mouth daily 90 tablet 3    ipratropium-albuterol (DUONEB) 0.5-2.5 (3) MG/3ML SOLN nebulizer solution Take 1 aerosol every 4-6 hours as needed for shortness of breath coughing and wheezing 360 mL 0    Fluticasone-Umeclidin-Vilant (TRELEGY ELLIPTA) 200-62.5-25 MCG/INH AEPB Inhale 1 puff into the lungs daily 1 each 11    DULoxetine (CYMBALTA) 60 MG extended release capsule TAKE 1 CAPSULE BY MOUTH TWICE A  capsule 0    alendronate (FOSAMAX) 70 MG tablet TAKE 1 TABLET BY MOUTH ONE TIME PER WEEK 12 tablet 1    aspirin 81 MG EC tablet Take 1 tablet by mouth in the morning. 30 tablet 3    albuterol sulfate  (90 Base) MCG/ACT inhaler Inhale 2 puffs into the lungs every 6 hours as needed for Shortness of Breath 1 each 11    Calcium Citrate-Vitamin D 500-500 MG-UNIT CHEW Take 1 tablet by mouth 2 times daily      ONE TOUCH LANCETS MISC 1 each by Does not apply route daily 100 each 3    Cholecalciferol (VITAMIN D) 2000 units TABS tablet Take 1 tablet by mouth daily 30 tablet     cetirizine (ZYRTEC) 10 MG tablet Take 10 mg by mouth daily as needed for Allergies      Nebulizers (COMPRESSOR/NEBULIZER) MISC 1 Units by Does not apply route 4 times daily 1 each 3     Current Facility-Administered Medications   Medication Dose Route Frequency Provider Last Rate Last Admin    triamcinolone acetonide (KENALOG-40) injection 40 mg  40 mg IntraMUSCular Once Sumaya Ambrose MD         I will continue her current medication regimen  which is part of the above treatment schedule. It has been helping with Ms. Edwar Garza chronic  medical problems which for this visit include:   Diagnoses of Chronic pain syndrome, Lumbar spondylosis, Degeneration of lumbar or lumbosacral intervertebral disc, Fibromyalgia, Osteoarthritis of spine with radiculopathy, lumbar region, DDD (degenerative disc disease), lumbar, Trochanteric bursitis of right hip, Moderate episode of recurrent major depressive disorder ([address] Gastroesophageal reflux disease without esophagitis, and Primary insomnia were pertinent to this visit. Risks and benefits of the medications and other alternative treatments  including no treatment were discussed with the patient. The common side effects of these medications were also explained to the patient.   Informed verbal consent was obtained. Goals of current treatment regimen include improvement in pain, restoration of functioning- with focus on improvement in physical performance, general activity, work or disability,emotional distress, health care utilization and  decreased medication consumption. Will continue to monitor progress towards achieving/maintaining therapeutic goals with special emphasis on  1. Improvement in perceived interfernce  of pain with ADL's. Ability to do home exercises independently. Ability to do household chores indoor and/or outdoor work and social and leisure activities. Improve psychosocial and physical functioning. - she is showing progression towards this treatment goal with the current regimen. She was advised against drinking alcohol with the narcotic pain medicines, advised against driving or handling machinery while adjusting the dose of medicines or if having cognitive  issues related to the current medications. Risk of overdose and death, if medicines not taken as prescribed, were also discussed. If the patient develops new symptoms or if the symptoms worsen, the patient should call the office. While transcribing every attempt was made to maintain the accuracy of the note in terms of it's contents,there may have been some errors made inadvertently. Thank you for allowing me to participate in the care of this patient.     Eileen Pitts MD.    Cc: HECTOR Mccollum - CNP

## 2022-12-21 RX ORDER — PANTOPRAZOLE SODIUM 40 MG/1
40 TABLET, DELAYED RELEASE ORAL DAILY
Qty: 30 TABLET | Refills: 1 | Status: SHIPPED | OUTPATIENT
Start: 2022-12-21

## 2022-12-21 RX ORDER — OXYCODONE HYDROCHLORIDE AND ACETAMINOPHEN 5; 325 MG/1; MG/1
1 TABLET ORAL EVERY 6 HOURS PRN
Qty: 112 TABLET | Refills: 0 | Status: SHIPPED | OUTPATIENT
Start: 2022-12-21 | End: 2023-01-18

## 2022-12-21 RX ORDER — TRAZODONE HYDROCHLORIDE 50 MG/1
50-100 TABLET ORAL NIGHTLY
Qty: 60 TABLET | Refills: 1 | Status: SHIPPED | OUTPATIENT
Start: 2022-12-21

## 2022-12-21 RX ORDER — GABAPENTIN 600 MG/1
600 TABLET ORAL 2 TIMES DAILY
Qty: 60 TABLET | Refills: 1 | Status: SHIPPED | OUTPATIENT
Start: 2022-12-21 | End: 2023-01-20

## 2023-01-03 ENCOUNTER — PATIENT MESSAGE (OUTPATIENT)
Dept: FAMILY MEDICINE CLINIC | Age: 78
End: 2023-01-03

## 2023-01-03 DIAGNOSIS — F33.1 MODERATE EPISODE OF RECURRENT MAJOR DEPRESSIVE DISORDER (HCC): ICD-10-CM

## 2023-01-03 DIAGNOSIS — G89.4 CHRONIC PAIN SYNDROME: ICD-10-CM

## 2023-01-04 RX ORDER — DULOXETIN HYDROCHLORIDE 60 MG/1
CAPSULE, DELAYED RELEASE ORAL
Qty: 180 CAPSULE | Refills: 0 | Status: SHIPPED | OUTPATIENT
Start: 2023-01-04

## 2023-01-04 NOTE — TELEPHONE ENCOUNTER
From: Flower Mccall  To: Pop Arboleda  Sent: 1/3/2023 11:16 PM EST  Subject: Refill needed     Can you submit a refill request for my Duloxetine but have it sent to [facility]? I am out of the medication.  Thanks, Chelsie Fregoso

## 2023-01-09 ENCOUNTER — OFFICE VISIT (OUTPATIENT)
Dept: ENT CLINIC | Age: 78
End: 2023-01-09

## 2023-01-09 VITALS
SYSTOLIC BLOOD PRESSURE: 144 MMHG | HEART RATE: 102 BPM | HEIGHT: 65 IN | RESPIRATION RATE: 16 BRPM | OXYGEN SATURATION: 93 % | DIASTOLIC BLOOD PRESSURE: 96 MMHG | WEIGHT: 131 LBS | BODY MASS INDEX: 21.83 KG/M2

## 2023-01-09 DIAGNOSIS — J34.2 DEVIATED SEPTUM: ICD-10-CM

## 2023-01-09 DIAGNOSIS — R04.0 EPISTAXIS: Primary | ICD-10-CM

## 2023-01-09 NOTE — PROGRESS NOTES
[address] [facility]  Follow up      Patient Name: Mike Ayers  Medical Record Number:  [id number]  Primary Care Physician:  Brynn Shaw, APRN - [facility]  Date of Consultation: 1/9/2023    Chief Complaint: Epistaxis        Interval History    Patient is following up for her nosebleeds. I last saw her on December 12, 2022 and had to cauterize the right nasal cavity. She said that she been having some intermittent bleeding from the left nasal cavity. It has not been nearly as severe as the right side. REVIEW OF SYSTEMS  As above    PHYSICAL EXAM  GENERAL: No Acute Distress, Alert and Oriented, no Hoarseness, strong voice  EYES: EOMI, Anti-icteric  HENT:   Head: Normocephalic and atraumatic. Face:  Symmetric, facial nerve intact, no sinus tenderness  Right Ear: Normal external ear  Left Ear: Normal external ear  Mouth/Oral Cavity:  normal lips, Uvula is midline, no mucosal lesions, no trismus,  Oropharynx/Larynx:  normal oropharynx,  Nose:Normal external nasal appearance. See below  NECK: Normal range of motion, no thyromegaly, trachea is midline, no lymphadenopathy, no neck masses, no crepitus          PROCEDURE  Control of epistaxis  Afrin and lidocaine were applied the bilateral nasal cavity. Anterior anoscopy shows some crusting in the posterior right nasal cavity. She has some active bleeding of the left anterior septum and I cauterized with silver nitrate. ASSESSMENT/PLAN  1. Epistaxis  Overall improved, but she did have active bleeding on the left septum today that I cauterized. As long as is not severe I would not recommend any more aggressive treatment at this time. Continue saline. I would like for her to call my office if she has bleeding we can take care of it. Long-term if this continues then we would need to surgically intervene.     2. Deviated septum  As above             I have performed a head and neck physical exam personally or was physically present during the key or critical portions of the service. This note was generated completely or in part utilizing Dragon dictation speech recognition software. Occasionally, words are mistranscribed and despite editing, the text may contain inaccuracies due to incorrect word recognition. If further clarification is needed please contact the office at [phone].

## 2023-01-09 NOTE — PROGRESS NOTES
MADELYN message from Kayli at [facility] pharmacy      Calling to give PCP update for Warfarin dosing .    Today, pt directions to take Warfarin 10mg + 2mg     Warfarin 10mg on Mondays    Take 2-4mg tabs for all other days.    Ok to call Kayli- [phone] ext [phone] with further questions.     Brandy CARDENAS RN  EP Triage     Pt weaned down to 10L HFNC overnight. Ambulated to bedside commode this AM w/ desat into low 80s. Pt has been turned back up to 12L, O2 is now 98% after pt had time to recover.

## 2023-01-13 ENCOUNTER — OFFICE VISIT (OUTPATIENT)
Dept: FAMILY MEDICINE CLINIC | Age: 78
End: 2023-01-13
Payer: MEDICARE

## 2023-01-13 VITALS
TEMPERATURE: 98.4 F | DIASTOLIC BLOOD PRESSURE: 68 MMHG | HEART RATE: 70 BPM | RESPIRATION RATE: 18 BRPM | SYSTOLIC BLOOD PRESSURE: 130 MMHG | OXYGEN SATURATION: 98 % | WEIGHT: 133.4 LBS | BODY MASS INDEX: 22.23 KG/M2 | HEIGHT: 65 IN

## 2023-01-13 DIAGNOSIS — E78.00 HYPERCHOLESTEREMIA: ICD-10-CM

## 2023-01-13 DIAGNOSIS — E11.9 CONTROLLED TYPE 2 DIABETES MELLITUS WITHOUT COMPLICATION, WITHOUT LONG-TERM CURRENT USE OF INSULIN (HCC): ICD-10-CM

## 2023-01-13 DIAGNOSIS — J44.9 COPD, SEVERE (HCC): ICD-10-CM

## 2023-01-13 DIAGNOSIS — R31.0 GROSS HEMATURIA: ICD-10-CM

## 2023-01-13 DIAGNOSIS — N89.8 VAGINAL DISCHARGE: ICD-10-CM

## 2023-01-13 DIAGNOSIS — D64.9 CHRONIC ANEMIA: Primary | ICD-10-CM

## 2023-01-13 LAB
BILIRUBIN, POC: NORMAL
BLOOD URINE, POC: NORMAL
CLARITY, POC: NORMAL
COLOR, POC: YELLOW
GLUCOSE URINE, POC: NORMAL
KETONES, POC: NORMAL
LEUKOCYTE EST, POC: NORMAL
NITRITE, POC: NORMAL
PH, POC: 5.5
PROTEIN, POC: NORMAL
SPECIFIC GRAVITY, POC: 1.02
UROBILINOGEN, POC: 1

## 2023-01-13 PROCEDURE — 3023F SPIROM DOC REV: CPT | Performed by: NURSE PRACTITIONER

## 2023-01-13 PROCEDURE — 1090F PRES/ABSN URINE INCON ASSESS: CPT | Performed by: NURSE PRACTITIONER

## 2023-01-13 PROCEDURE — G8484 FLU IMMUNIZE NO ADMIN: HCPCS | Performed by: NURSE PRACTITIONER

## 2023-01-13 PROCEDURE — 99214 OFFICE O/P EST MOD 30 MIN: CPT | Performed by: NURSE PRACTITIONER

## 2023-01-13 PROCEDURE — G8399 PT W/DXA RESULTS DOCUMENT: HCPCS | Performed by: NURSE PRACTITIONER

## 2023-01-13 PROCEDURE — 3046F HEMOGLOBIN A1C LEVEL >9.0%: CPT | Performed by: NURSE PRACTITIONER

## 2023-01-13 PROCEDURE — G8420 CALC BMI NORM PARAMETERS: HCPCS | Performed by: NURSE PRACTITIONER

## 2023-01-13 PROCEDURE — 1123F ACP DISCUSS/DSCN MKR DOCD: CPT | Performed by: NURSE PRACTITIONER

## 2023-01-13 PROCEDURE — 81002 URINALYSIS NONAUTO W/O SCOPE: CPT | Performed by: NURSE PRACTITIONER

## 2023-01-13 PROCEDURE — 3017F COLORECTAL CA SCREEN DOC REV: CPT | Performed by: NURSE PRACTITIONER

## 2023-01-13 PROCEDURE — G8427 DOCREV CUR MEDS BY ELIG CLIN: HCPCS | Performed by: NURSE PRACTITIONER

## 2023-01-13 PROCEDURE — 2022F DILAT RTA XM EVC RTNOPTHY: CPT | Performed by: NURSE PRACTITIONER

## 2023-01-13 PROCEDURE — 1036F TOBACCO NON-USER: CPT | Performed by: NURSE PRACTITIONER

## 2023-01-13 RX ORDER — ATORVASTATIN CALCIUM 80 MG/1
TABLET, FILM COATED ORAL
Qty: 90 TABLET | Refills: 1 | Status: SHIPPED | OUTPATIENT
Start: 2023-01-13

## 2023-01-13 ASSESSMENT — ENCOUNTER SYMPTOMS
SHORTNESS OF BREATH: 1
COUGH: 1
CHEST TIGHTNESS: 0
SINUS PAIN: 0
ABDOMINAL PAIN: 0
EYE ITCHING: 0
WHEEZING: 1

## 2023-01-13 ASSESSMENT — PATIENT HEALTH QUESTIONNAIRE - PHQ9
10. IF YOU CHECKED OFF ANY PROBLEMS, HOW DIFFICULT HAVE THESE PROBLEMS MADE IT FOR YOU TO DO YOUR WORK, TAKE CARE OF THINGS AT HOME, OR GET ALONG WITH OTHER PEOPLE: 0
3. TROUBLE FALLING OR STAYING ASLEEP: 0
9. THOUGHTS THAT YOU WOULD BE BETTER OFF DEAD, OR OF HURTING YOURSELF: 0
SUM OF ALL RESPONSES TO PHQ9 QUESTIONS 1 & 2: 1
8. MOVING OR SPEAKING SO SLOWLY THAT OTHER PEOPLE COULD HAVE NOTICED. OR THE OPPOSITE, BEING SO FIGETY OR RESTLESS THAT YOU HAVE BEEN MOVING AROUND A LOT MORE THAN USUAL: 0
7. TROUBLE CONCENTRATING ON THINGS, SUCH AS READING THE NEWSPAPER OR WATCHING TELEVISION: 1
SUM OF ALL RESPONSES TO PHQ QUESTIONS 1-9: 3
SUM OF ALL RESPONSES TO PHQ QUESTIONS 1-9: 3
2. FEELING DOWN, DEPRESSED OR HOPELESS: 1
SUM OF ALL RESPONSES TO PHQ QUESTIONS 1-9: 3
1. LITTLE INTEREST OR PLEASURE IN DOING THINGS: 0
4. FEELING TIRED OR HAVING LITTLE ENERGY: 1
SUM OF ALL RESPONSES TO PHQ QUESTIONS 1-9: 3
5. POOR APPETITE OR OVEREATING: 0
6. FEELING BAD ABOUT YOURSELF - OR THAT YOU ARE A FAILURE OR HAVE LET YOURSELF OR YOUR FAMILY DOWN: 0

## 2023-01-13 NOTE — PROGRESS NOTES
1/13/2023    This is a 78 y.o. female   Chief Complaint   Patient presents with    Vaginal Discharge     X2 months. Greenish yellow. Noticed some blood in urine    Other     Question about medications. Cough     Coughing up green/yellow mucous in the morning. Tena DANIELS  This is a 79 y/o female presenting for follow-up of hyperlipidemia, DM, GERD, COPD, osteopenia,    Reports that she has noticed small amount of blood in urine daily, but doesn't have with every urination. This has been going on for a couple of months. Denies dysuria or urinary frequency. Hyperlipidemia:   Reports she has not taken the atorvastatin in multiple months. Thought that someone took her off the medication in the hospital.     GERD: Stable on daily PPI     COPD: On trelegy inhaler daily. She is using her albuterol inhaler 1-2 times per day. Continues with wheezing and shortness of breath. Uses nebulizer every couple of days. Continues to smoke 1 ppd. Osteoporosis: Last DEXA was 9/2021. Taking calcium and vitamin D supplement daily and alendronate weekly. Insomnia: Denies issues. On trazodone. Diabetes Mellitus Type 2: Current symptoms/problems include none. Home blood sugar records: patient does not test  Any episodes of hypoglycemia? no  Eye exam current (within one year): no   reports that she quit smoking about 6 months ago. Her smoking use included cigarettes. She started smoking about 61 years ago. She has a 55.00 pack-year smoking history.  She has never used smokeless tobacco.       Lab Results   Component Value Date    LABA1C 6.0 07/03/2022    LABA1C 5.9 06/03/2022    LABA1C 5.8 02/01/2022     Lab Results   Component Value Date    LABMICR YES 07/22/2022    CREATININE 0.7 10/03/2022     Lab Results   Component Value Date    ALT 11 08/31/2022    AST 9 08/31/2022     Lab Results   Component Value Date    CHOL 171 02/17/2022    TRIG 146 02/17/2022    HDL 59 02/17/2022    LDLCALC 83 02/17/2022    LDLDIRECT 176 (H) 10/12/2012        Depression- feels that mood is better with taking cymbalta 60 mg BID.      CAD- s/p PCI 7/2022   Follows with cardiologist Dr. Palma Davidson     Patient Active Problem List   Diagnosis    Osteopenia-last dexa 2009 repeat 2015 (-2.4 hip)--advised tx    Colon polyp, hyperplastic,-(done 3/11-repeat 3-5 yrs--dr Rudy Zuleta)    Family history of colon cancer    CTS (carpal tunnel syndrome)BILATERAL-s/p surgical repair--resolved     Postmenopausal status-last mammogram 2/15 wnl last pap 12/13--wnl    Nondependent alcohol abuse, in remission    Urticaria, chronic    Smoking    Chronic back pain-(djd) saw dr Siddhartha Quesada afford surgery--seeing dr Karely Tilley for pain meds    Fibromyalgia    Hypercholesteremia    Lumbar spondylosis    Vitamin D deficiency--severe--started 50,000 iu 2x/ wk 11/13    Insomnia--on elevil w help    Constipation--on daily miralax    Depression    Chronic pain syndrome    Muscle cramping    Acute on chronic respiratory failure with hypercapnia ([address]    ROLY (acute kidney injury) ([address]    Severe sepsis (HCC)    Gastroesophageal reflux disease    COPD, severe ([address]    Chronic hypoxemic respiratory failure ([address]    Degeneration of lumbar or lumbosacral intervertebral disc    Trochanteric bursitis of right hip    Diabetes ([address]    Controlled type 2 diabetes mellitus without complication, without long-term current use of insulin ([address]    Age-related osteoporosis without current pathological fracture- started alendronate 9/2021    Pulmonary nodule seen on imaging study    General weakness    Elevated lactic acid level    Cavitary pneumonia    Acute respiratory failure with hypoxia (HCC)    Acute on chronic respiratory failure with hypoxia and hypercapnia (HCC)    Abnormal EKG    NSTEMI (non-ST elevated myocardial infarction) ([address]    Abscess of upper lobe of right lung with pneumonia (HCC)    Pseudomonas respiratory infection    Abnormal CT of the chest    Peripherally inserted central catheter (PICC) in place    Massive hemoptysis    Epistaxis    Acute respiratory insufficiency    Acute blood loss anemia    Hypotension due to drugs    Hypoxia    Mixed restrictive and obstructive lung disease (HCC)          Current Outpatient Medications   Medication Sig Dispense Refill    DULoxetine (CYMBALTA) 60 MG extended release capsule TAKE 1 CAPSULE BY MOUTH TWICE A  capsule 0    pantoprazole (PROTONIX) 40 MG tablet Take 1 tablet by mouth daily 30 tablet 1    traZODone (DESYREL) 50 MG tablet Take 1-2 tablets by mouth nightly 60 tablet 1    oxyCODONE-acetaminophen (PERCOCET) 5-325 MG per tablet Take 1 tablet by mouth every 6 hours as needed for Pain (Max 4 per day) for up to 28 days. 112 tablet 0    gabapentin (NEURONTIN) 600 MG tablet Take 1 tablet by mouth 2 times daily for 30 days. 60 tablet 1    prasugrel (EFFIENT) 10 MG TABS Take 1 tablet by mouth daily 90 tablet 3    ipratropium-albuterol (DUONEB) 0.5-2.5 (3) MG/3ML SOLN nebulizer solution Take 1 aerosol every 4-6 hours as needed for shortness of breath coughing and wheezing 360 mL 0    Fluticasone-Umeclidin-Vilant (TRELEGY ELLIPTA) 200-62.5-25 MCG/INH AEPB Inhale 1 puff into the lungs daily 1 each 11    alendronate (FOSAMAX) 70 MG tablet TAKE 1 TABLET BY MOUTH ONE TIME PER WEEK 12 tablet 1    aspirin 81 MG EC tablet Take 1 tablet by mouth in the morning.  30 tablet 3    albuterol sulfate  (90 Base) MCG/ACT inhaler Inhale 2 puffs into the lungs every 6 hours as needed for Shortness of Breath 1 each 11    Calcium Citrate-Vitamin D 500-500 MG-UNIT CHEW Take 1 tablet by mouth 2 times daily      ONE TOUCH LANCETS MISC 1 each by Does not apply route daily 100 each 3    Cholecalciferol (VITAMIN D) 2000 units TABS tablet Take 1 tablet by mouth daily 30 tablet     cetirizine (ZYRTEC) 10 MG tablet Take 10 mg by mouth daily as needed for Allergies      Nebulizers (COMPRESSOR/NEBULIZER) MISC 1 Units by Does not apply route 4 times daily 1 each 3 Current Facility-Administered Medications   Medication Dose Route Frequency Provider Last Rate Last Admin    triamcinolone acetonide (KENALOG-40) injection 40 mg  40 mg IntraMUSCular Once Ratna Lozano MD           No Known Allergies    Review of Systems   Constitutional:  Positive for fatigue. Negative for activity change and fever. HENT:  Negative for congestion and sinus pain. Eyes:  Negative for itching. Respiratory:  Positive for cough, shortness of breath and wheezing. Negative for chest tightness. Cardiovascular:  Negative for chest pain, palpitations and leg swelling. Gastrointestinal:  Negative for abdominal pain. Genitourinary:  Positive for vaginal discharge. Negative for difficulty urinating, dysuria, frequency, hematuria and pelvic pain. Musculoskeletal:  Positive for arthralgias and myalgias. Neurological:  Negative for dizziness, syncope, weakness, numbness and headaches. Psychiatric/Behavioral:  Negative for confusion. All other systems reviewed and are negative. Vitals:    01/13/23 1125   BP: 130/68   Site: Right Upper Arm   Position: Sitting   Cuff Size: Medium Adult   Pulse: 70   Resp: 18   Temp: 98.4 °F (36.9 °C)   TempSrc: Oral   SpO2: 98%   Weight: 133 lb 6.4 oz (60.5 kg)   Height: 5' 5\" (1.651 m)       Body mass index is 22.2 kg/m². Wt Readings from Last 3 Encounters:   01/13/23 133 lb 6.4 oz (60.5 kg)   01/09/23 131 lb (59.4 kg)   12/20/22 132 lb (59.9 kg)       BP Readings from Last 3 Encounters:   01/13/23 130/68   01/09/23 (!) 144/96   12/20/22 139/73       Physical Exam  Vitals and nursing note reviewed. Exam conducted with a chaperone present. Constitutional:       General: She is not in acute distress. Appearance: Normal appearance. She is well-developed. HENT:      Head: Normocephalic and atraumatic. Cardiovascular:      Rate and Rhythm: Normal rate and regular rhythm. Heart sounds: Normal heart sounds. No murmur heard.     No friction rub. No gallop. Pulmonary:      Effort: Pulmonary effort is normal. No respiratory distress. Breath sounds: Normal breath sounds. Abdominal:      Palpations: Abdomen is soft. Tenderness: There is no abdominal tenderness. There is no right CVA tenderness, left CVA tenderness, guarding or rebound. Genitourinary:     Comments: Thin white vaginal discharged noted   Musculoskeletal:      Cervical back: Neck supple. Right lower leg: No edema. Left lower leg: No edema. Skin:     General: Skin is warm and dry. Neurological:      Mental Status: She is alert and oriented to person, place, and time. Psychiatric:         Behavior: Behavior normal.         Thought Content: Thought content normal.         Judgment: Judgment normal.       Assessmentand Plan  Nereida Unger was seen today for follow-up. Diagnoses and all orders for this visit:    COPD, severe ([address]): stable  Continue f/u with pulmonologist Dr. Helen alejo inhaler daiy and albuterol inhaler BID prn and nebulizer PRN  Smoking cessation discussed and needed. Hypercholesteremia  -     Comprehensive Metabolic Panel; Future  -     Lipid Panel; Future  -     atorvastatin (LIPITOR) 80 MG tablet; TAKE 1 TABLET BY MOUTH EVERY DAY FOR CHOLESTEROL  She reports that she has not taken her statin since summer 2022. Advised to restart. Advised to continue low fat/choleserol diet supplemented with aerobic exercise. Gastroesophageal reflux disease  Continue Protonix daily. Advised to avoid foods associated with acid reflux. DM type 2: diet controlled   -     Comprehensive Metabolic Panel; Future  -     Hemoglobin A1C; Future  Advised to continue low carb/fat diet supplemented with aerobic exercise. Osteoporosis-   Continue calcium and vitamin D supplements and alendronate weekly   Last DEXA scan 9/2021. Repeat in 2 years. Moderate episode of recurrent major depressive disorder (HCC)  Mood improved.  Continue cymbalta 60 mg BID    Vaginal discharge  -     VAGINAL PATHOGENS PROBE *A    Gross hematuria  -     POCT Urinalysis no Micro- trace blood and small amount of leukocytes   -     Culture, Urine  -     AFL - Matilda Kaur MD, Urology, [facility]  Patient is reporting gross hematuria daily. Advised to have evaluation with urologist.     Chronic anemia   Repeat CBC  Was having followed by ID when on antibiotics, but that is now resolved. Has not been monitored recently. Will repeat CBC. Return in about 3 months (around 4/13/2023), or if symptoms worsen or fail to improve, for chronic conditions.

## 2023-01-14 LAB
CANDIDA SPECIES, DNA PROBE: NORMAL
GARDNERELLA VAGINALIS, DNA PROBE: NORMAL
TRICHOMONAS VAGINALIS DNA: NORMAL

## 2023-01-15 LAB
ORGANISM: ABNORMAL
URINE CULTURE, ROUTINE: ABNORMAL
URINE CULTURE, ROUTINE: ABNORMAL

## 2023-01-16 DIAGNOSIS — N30.01 ACUTE CYSTITIS WITH HEMATURIA: Primary | ICD-10-CM

## 2023-01-16 RX ORDER — AMOXICILLIN 500 MG/1
500 CAPSULE ORAL 3 TIMES DAILY
Qty: 30 CAPSULE | Refills: 0 | Status: SHIPPED | OUTPATIENT
Start: 2023-01-16 | End: 2023-01-26

## 2023-01-17 ENCOUNTER — OFFICE VISIT (OUTPATIENT)
Dept: PAIN MANAGEMENT | Age: 78
End: 2023-01-17
Payer: MEDICARE

## 2023-01-17 VITALS
HEART RATE: 101 BPM | WEIGHT: 134 LBS | SYSTOLIC BLOOD PRESSURE: 107 MMHG | BODY MASS INDEX: 22.3 KG/M2 | DIASTOLIC BLOOD PRESSURE: 69 MMHG

## 2023-01-17 DIAGNOSIS — F33.1 MODERATE EPISODE OF RECURRENT MAJOR DEPRESSIVE DISORDER (HCC): ICD-10-CM

## 2023-01-17 DIAGNOSIS — F51.01 PRIMARY INSOMNIA: ICD-10-CM

## 2023-01-17 DIAGNOSIS — E78.00 HYPERCHOLESTEREMIA: ICD-10-CM

## 2023-01-17 DIAGNOSIS — D64.9 CHRONIC ANEMIA: ICD-10-CM

## 2023-01-17 DIAGNOSIS — M47.26 OSTEOARTHRITIS OF SPINE WITH RADICULOPATHY, LUMBAR REGION: ICD-10-CM

## 2023-01-17 DIAGNOSIS — R25.2 MUSCLE CRAMPING: ICD-10-CM

## 2023-01-17 DIAGNOSIS — M51.37 DEGENERATION OF LUMBAR OR LUMBOSACRAL INTERVERTEBRAL DISC: ICD-10-CM

## 2023-01-17 DIAGNOSIS — E11.9 CONTROLLED TYPE 2 DIABETES MELLITUS WITHOUT COMPLICATION, WITHOUT LONG-TERM CURRENT USE OF INSULIN (HCC): ICD-10-CM

## 2023-01-17 DIAGNOSIS — G89.4 CHRONIC PAIN SYNDROME: ICD-10-CM

## 2023-01-17 DIAGNOSIS — M47.816 LUMBAR SPONDYLOSIS: ICD-10-CM

## 2023-01-17 DIAGNOSIS — K21.9 GASTROESOPHAGEAL REFLUX DISEASE WITHOUT ESOPHAGITIS: ICD-10-CM

## 2023-01-17 DIAGNOSIS — M51.36 DDD (DEGENERATIVE DISC DISEASE), LUMBAR: ICD-10-CM

## 2023-01-17 DIAGNOSIS — M70.61 TROCHANTERIC BURSITIS OF RIGHT HIP: ICD-10-CM

## 2023-01-17 DIAGNOSIS — M79.7 FIBROMYALGIA: ICD-10-CM

## 2023-01-17 LAB
A/G RATIO: 1.2 (ref 1.1–2.2)
ALBUMIN SERPL-MCNC: 3.8 G/DL (ref 3.4–5)
ALP BLD-CCNC: 100 U/L (ref 40–129)
ALT SERPL-CCNC: <5 U/L (ref 10–40)
ANION GAP SERPL CALCULATED.3IONS-SCNC: 12 MMOL/L (ref 3–16)
AST SERPL-CCNC: 9 U/L (ref 15–37)
BASOPHILS ABSOLUTE: 0 K/UL (ref 0–0.2)
BASOPHILS RELATIVE PERCENT: 0.5 %
BILIRUB SERPL-MCNC: <0.2 MG/DL (ref 0–1)
BUN BLDV-MCNC: 12 MG/DL (ref 7–20)
CALCIUM SERPL-MCNC: 9.7 MG/DL (ref 8.3–10.6)
CHLORIDE BLD-SCNC: 103 MMOL/L (ref 99–110)
CHOLESTEROL, TOTAL: 210 MG/DL (ref 0–199)
CO2: 26 MMOL/L (ref 21–32)
CREAT SERPL-MCNC: 0.9 MG/DL (ref 0.6–1.2)
EOSINOPHILS ABSOLUTE: 0.4 K/UL (ref 0–0.6)
EOSINOPHILS RELATIVE PERCENT: 4.4 %
GFR SERPL CREATININE-BSD FRML MDRD: >60 ML/MIN/{1.73_M2}
GLUCOSE BLD-MCNC: 105 MG/DL (ref 70–99)
HCT VFR BLD CALC: 35.8 % (ref 36–48)
HDLC SERPL-MCNC: 48 MG/DL (ref 40–60)
HEMOGLOBIN: 11.4 G/DL (ref 12–16)
LDL CHOLESTEROL CALCULATED: 134 MG/DL
LYMPHOCYTES ABSOLUTE: 1.8 K/UL (ref 1–5.1)
LYMPHOCYTES RELATIVE PERCENT: 19.9 %
MCH RBC QN AUTO: 27.9 PG (ref 26–34)
MCHC RBC AUTO-ENTMCNC: 31.9 G/DL (ref 31–36)
MCV RBC AUTO: 87.3 FL (ref 80–100)
MONOCYTES ABSOLUTE: 0.6 K/UL (ref 0–1.3)
MONOCYTES RELATIVE PERCENT: 6.8 %
NEUTROPHILS ABSOLUTE: 6.1 K/UL (ref 1.7–7.7)
NEUTROPHILS RELATIVE PERCENT: 68.4 %
PDW BLD-RTO: 14.9 % (ref 12.4–15.4)
PLATELET # BLD: 540 K/UL (ref 135–450)
PMV BLD AUTO: 7 FL (ref 5–10.5)
POTASSIUM SERPL-SCNC: 4.5 MMOL/L (ref 3.5–5.1)
RBC # BLD: 4.1 M/UL (ref 4–5.2)
SODIUM BLD-SCNC: 141 MMOL/L (ref 136–145)
TOTAL PROTEIN: 7.1 G/DL (ref 6.4–8.2)
TRIGL SERPL-MCNC: 139 MG/DL (ref 0–150)
VLDLC SERPL CALC-MCNC: 28 MG/DL
WBC # BLD: 9 K/UL (ref 4–11)

## 2023-01-17 PROCEDURE — 1123F ACP DISCUSS/DSCN MKR DOCD: CPT | Performed by: INTERNAL MEDICINE

## 2023-01-17 PROCEDURE — G8484 FLU IMMUNIZE NO ADMIN: HCPCS | Performed by: INTERNAL MEDICINE

## 2023-01-17 PROCEDURE — G8399 PT W/DXA RESULTS DOCUMENT: HCPCS | Performed by: INTERNAL MEDICINE

## 2023-01-17 PROCEDURE — 3017F COLORECTAL CA SCREEN DOC REV: CPT | Performed by: INTERNAL MEDICINE

## 2023-01-17 PROCEDURE — 99214 OFFICE O/P EST MOD 30 MIN: CPT | Performed by: INTERNAL MEDICINE

## 2023-01-17 PROCEDURE — 1036F TOBACCO NON-USER: CPT | Performed by: INTERNAL MEDICINE

## 2023-01-17 PROCEDURE — G8427 DOCREV CUR MEDS BY ELIG CLIN: HCPCS | Performed by: INTERNAL MEDICINE

## 2023-01-17 PROCEDURE — G8420 CALC BMI NORM PARAMETERS: HCPCS | Performed by: INTERNAL MEDICINE

## 2023-01-17 PROCEDURE — 1090F PRES/ABSN URINE INCON ASSESS: CPT | Performed by: INTERNAL MEDICINE

## 2023-01-17 RX ORDER — GABAPENTIN 600 MG/1
600 TABLET ORAL 2 TIMES DAILY
Qty: 60 TABLET | Refills: 1 | Status: SHIPPED | OUTPATIENT
Start: 2023-01-17 | End: 2023-02-16

## 2023-01-17 RX ORDER — MELOXICAM 15 MG/1
TABLET ORAL
Qty: 30 TABLET | Refills: 0 | Status: SHIPPED | OUTPATIENT
Start: 2023-01-17

## 2023-01-17 RX ORDER — PANTOPRAZOLE SODIUM 40 MG/1
40 TABLET, DELAYED RELEASE ORAL DAILY
Qty: 30 TABLET | Refills: 1 | Status: SHIPPED | OUTPATIENT
Start: 2023-01-17

## 2023-01-17 RX ORDER — OXYCODONE AND ACETAMINOPHEN 7.5; 325 MG/1; MG/1
1 TABLET ORAL 3 TIMES DAILY
Qty: 84 TABLET | Refills: 0 | Status: SHIPPED | OUTPATIENT
Start: 2023-01-17 | End: 2023-02-14

## 2023-01-17 NOTE — PROGRESS NOTES
[facility]  1945  [id number]    HISTORY OF PRESENT ILLNESS:  Ms. Selina Bush is a 78 y.o. female returns for a follow up visit for multiple medical problems. Her  presenting problems are   1. Chronic pain syndrome    2. Lumbar spondylosis    3. Degeneration of lumbar or lumbosacral intervertebral disc    4. Fibromyalgia    5. Osteoarthritis of spine with radiculopathy, lumbar region    6. DDD (degenerative disc disease), lumbar    7. Trochanteric bursitis of right hip    8. Moderate episode of recurrent major depressive disorder ([address]    9. Gastroesophageal reflux disease without esophagitis    10. Primary insomnia    11. Muscle cramping    . As per information/history obtained from the PADT(patient assessment and documentation tool) -  She complains of pain in the lower back with radiation to the buttocks and hips Right She rates the pain 8/10 and describes it as aching. Pain is made worse by: standing. Current treatment regimen has helped relieve about 30% of the pain. She denies side effects from the current pain regimen. Patient reports that since the last follow up visit the physical functioning is unchanged, family/social relationships are unchanged, mood is unchanged and sleep patterns are unchanged, and that the overall functioning is unchanged. Patient denies neurological bowel or bladder. Patient denies misusing/abusing her narcotic pain medications or using any illegal drugs. There are No indicators for possible drug abuse, addiction or diversion problems. Upon obtaining the medical history from Ms. Selina Bush regarding today's office visit for her presenting problems, patient states she has been doing fair, she states her back has been hurting bad lately, She complains of increased pain with changes in weather. Extreme temperatures- cold and damp weather causes increased pain. Ms. Selina Bush states she is using Percocet 4 per day but not helping as well, she wants the dose increased.  She states her breathing has been good, she is not having to use oxygen, she is not smoking anymore. Patient states her sleep is fair. Has fairly normal sleep latency. Averages about 4-6 hours of sleep a night. Denies any signs of sleep apnea. Feels somewhat rested in the morning, she is on Trazodone. She says she is using Protonix for GERD symptoms. Patient mentions she is using Neurontin 1200 mg. ALLERGIES/PAST MED/FAM/SOC HISTORY: Ms. Kevin Willams allergies, past medical, family and social history were reviewed in the chart. Ms. Kevin Willams current medications are   Outpatient Medications Prior to Visit   Medication Sig Dispense Refill    amoxicillin (AMOXIL) 500 MG capsule Take 1 capsule by mouth 3 times daily for 10 days For treatment of bacterial infection. 30 capsule 0    atorvastatin (LIPITOR) 80 MG tablet TAKE 1 TABLET BY MOUTH EVERY DAY FOR CHOLESTEROL 90 tablet 1    DULoxetine (CYMBALTA) 60 MG extended release capsule TAKE 1 CAPSULE BY MOUTH TWICE A  capsule 0    pantoprazole (PROTONIX) 40 MG tablet Take 1 tablet by mouth daily 30 tablet 1    traZODone (DESYREL) 50 MG tablet Take 1-2 tablets by mouth nightly 60 tablet 1    oxyCODONE-acetaminophen (PERCOCET) 5-325 MG per tablet Take 1 tablet by mouth every 6 hours as needed for Pain (Max 4 per day) for up to 28 days. 112 tablet 0    gabapentin (NEURONTIN) 600 MG tablet Take 1 tablet by mouth 2 times daily for 30 days. 60 tablet 1    prasugrel (EFFIENT) 10 MG TABS Take 1 tablet by mouth daily 90 tablet 3    ipratropium-albuterol (DUONEB) 0.5-2.5 (3) MG/3ML SOLN nebulizer solution Take 1 aerosol every 4-6 hours as needed for shortness of breath coughing and wheezing 360 mL 0    Fluticasone-Umeclidin-Vilant (TRELEGY ELLIPTA) 200-62.5-25 MCG/INH AEPB Inhale 1 puff into the lungs daily 1 each 11    alendronate (FOSAMAX) 70 MG tablet TAKE 1 TABLET BY MOUTH ONE TIME PER WEEK 12 tablet 1    aspirin 81 MG EC tablet Take 1 tablet by mouth in the morning.  30 tablet 3 albuterol sulfate  (90 Base) MCG/ACT inhaler Inhale 2 puffs into the lungs every 6 hours as needed for Shortness of Breath 1 each 11    Calcium Citrate-Vitamin D 500-500 MG-UNIT CHEW Take 1 tablet by mouth 2 times daily      ONE TOUCH LANCETS MISC 1 each by Does not apply route daily 100 each 3    Cholecalciferol (VITAMIN D) 2000 units TABS tablet Take 1 tablet by mouth daily 30 tablet     cetirizine (ZYRTEC) 10 MG tablet Take 10 mg by mouth daily as needed for Allergies      Nebulizers (COMPRESSOR/NEBULIZER) MISC 1 Units by Does not apply route 4 times daily 1 each 3     Facility-Administered Medications Prior to Visit   Medication Dose Route Frequency Provider Last Rate Last Admin    triamcinolone acetonide (KENALOG-40) injection 40 mg  40 mg IntraMUSCular Once Charlie Jenkins MD           REVIEW OF SYSTEMS: .   Respiratory: Negative for shortness of breath. Cardiovascular: Negative for chest pain, palpitations  Gastrointestinal: Negative for blood in stool, abdominal distention, nausea, vomiting, abdominal pain, diarrhea,constipation. Neurological: Negative for speech difficulty, weakness and light-headedness, dizziness, tremors, sleepiness  Psychiatric/Behavioral: Negative for suicidal ideas, hallucinations, behavioral problems, self-injury, decreased concentration/cognition, agitation, confusion. PHYSICAL EXAM:   Nursing note and vitals reviewed. /69   Pulse (!) 101   Wt 134 lb (60.8 kg)   BMI 22.30 kg/m²   General Appearance: Patient is well nourished, well developed, well groomed and in no acute distress. Skin: Skin is warm and dry, good turgor . No rash or lesions noted. She is not diaphoretic. Pulmonary/Chest: Effort normal. No respiratory distress or use of accessory muscles. Auscultation revealing decreased air exchange bilaterally. She does not have wheezes in the lung fields. She has no rales.    Cardiovascular: Normal rate, regular rhythm, normal heart sounds, and does not have murmur. Exam reveals no gallop and no friction rub. Musculoskeletal / Extremities: Range of motion is normal. Gait is normal, assistive devices use: none. She exhibits edema: none, and no tenderness. Neurological/Psychiatric:She is alert and oriented to person, place, and time. Coordination is  normal.   Judgement and Insight is normal  Her mood is Appropriate and affect is Neutral/Euthymic(normal) . Her behavior is normal.   thought content normal.        IMPRESSION:     1. Osteoarthritis of spine with radiculopathy, lumbar region  - WORSENING: treatment plan changed as below    2. Chronic pain syndrome  - WORSENING: treatment plan changed as below    3. Lumbar spondylosis  - WORSENING: treatment plan changed as below    4. Degeneration of lumbar or lumbosacral intervertebral disc  - WORSENING: treatment plan changed as below    5. Fibromyalgia - STABLE: Continue current treatment plan   6. DDD (degenerative disc disease), lumbar - STABLE: Continue current treatment plan   7. Trochanteric bursitis of right hip - STABLE: Continue current treatment plan   8. Moderate episode of recurrent major depressive disorder (HCC) - STABLE: Continue current treatment plan   9. Gastroesophageal reflux disease without esophagitis - STABLE: Continue current treatment plan   10. Primary insomnia - STABLE: Continue current treatment plan   11.  Muscle cramping - STABLE: Continue current treatment plan       PLAN:  Informed verbal consent was obtained.  -Start Mobic 15 mg 3 times a week  -Increase Percocet to 7.5 mg but decrease to 3 per day  -Advised caffeine reduction, dietary changes, elevate head end of bed, NPO after supper, if using alcohol advised reduction of alcohol intake, tobacco cessation if smoking, weight loss   -Continue with Protonix   -CBT techniques- relaxation therapies such as biofeedback, mindfulness based stress reduction, imagery, cognitive restructuring, problem solving discussed with patient -Continue with Neurontin 1200 mg   -She was advised to increase fluids ( 5-7  glasses of fluid daily), limit caffeine, avoid cheese products, increase dietary fiber, increase activity and exercise as tolerated and relax regularly and enjoy meals   -she was advised proper sleep hygiene-told to avoid:use of caffeine or other stimulants after noon, alcohol use near bedtime, long or frequent naps during the day, erratic sleep schedule, heavy meals near bedtime, vigorous exercise near bedtime and use of electronic devices near bedtime   -Continue with Trazodone  Ms. Zay Kyle will be prescribed  the medications  listed below which are for treatment of her presenting  medical problems which for this visit include:   Diagnoses of Chronic pain syndrome, Lumbar spondylosis, Degeneration of lumbar or lumbosacral intervertebral disc, Fibromyalgia, Osteoarthritis of spine with radiculopathy, lumbar region, DDD (degenerative disc disease), lumbar, Trochanteric bursitis of right hip, Moderate episode of recurrent major depressive disorder ([facility] [address] Gastroesophageal reflux disease without esophagitis, Primary insomnia, and Muscle cramping were pertinent to this visit. Medications/orders associated with this visit:    Current Outpatient Medications   Medication Sig Dispense Refill    amoxicillin (AMOXIL) 500 MG capsule Take 1 capsule by mouth 3 times daily for 10 days For treatment of bacterial infection. 30 capsule 0    atorvastatin (LIPITOR) 80 MG tablet TAKE 1 TABLET BY MOUTH EVERY DAY FOR CHOLESTEROL 90 tablet 1    DULoxetine (CYMBALTA) 60 MG extended release capsule TAKE 1 CAPSULE BY MOUTH TWICE A  capsule 0    pantoprazole (PROTONIX) 40 MG tablet Take 1 tablet by mouth daily 30 tablet 1    traZODone (DESYREL) 50 MG tablet Take 1-2 tablets by mouth nightly 60 tablet 1    oxyCODONE-acetaminophen (PERCOCET) 5-325 MG per tablet Take 1 tablet by mouth every 6 hours as needed for Pain (Max 4 per day) for up to 28 days.  [facility] 77-55 tablet 0    gabapentin (NEURONTIN) 600 MG tablet Take 1 tablet by mouth 2 times daily for 30 days. 60 tablet 1    prasugrel (EFFIENT) 10 MG TABS Take 1 tablet by mouth daily 90 tablet 3    ipratropium-albuterol (DUONEB) 0.5-2.5 (3) MG/3ML SOLN nebulizer solution Take 1 aerosol every 4-6 hours as needed for shortness of breath coughing and wheezing 360 mL 0    Fluticasone-Umeclidin-Vilant (TRELEGY ELLIPTA) 200-62.5-25 MCG/INH AEPB Inhale 1 puff into the lungs daily 1 each 11    alendronate (FOSAMAX) 70 MG tablet TAKE 1 TABLET BY MOUTH ONE TIME PER WEEK 12 tablet 1    aspirin 81 MG EC tablet Take 1 tablet by mouth in the morning. 30 tablet 3    albuterol sulfate  (90 Base) MCG/ACT inhaler Inhale 2 puffs into the lungs every 6 hours as needed for Shortness of Breath 1 each 11    Calcium Citrate-Vitamin D 500-500 MG-UNIT CHEW Take 1 tablet by mouth 2 times daily      ONE TOUCH LANCETS MISC 1 each by Does not apply route daily 100 each 3    Cholecalciferol (VITAMIN D) 2000 units TABS tablet Take 1 tablet by mouth daily 30 tablet     cetirizine (ZYRTEC) 10 MG tablet Take 10 mg by mouth daily as needed for Allergies      Nebulizers (COMPRESSOR/NEBULIZER) MISC 1 Units by Does not apply route 4 times daily 1 each 3     Current Facility-Administered Medications   Medication Dose Route Frequency Provider Last Rate Last Admin    triamcinolone acetonide (KENALOG-40) injection 40 mg  40 mg IntraMUSCular Once Keny Rosario MD            Goals of current treatment regimen include improvement in pain, restoration of functioning- with focus on improvement in physical performance, general activity, work or disability,emotional distress, health care utilization and  decreased medication consumption. Will continue to monitor progress towards achieving/maintaining therapeutic goals with special emphasis on  1. Improvement in perceived interfernce  of pain with ADL's. Ability to do home exercises independently.  Ability to do household chores indoor and/or outdoor work and social and leisure activities. To increase flexibility/ROM, strength and endurance. Improve psychosocial and physical functioning.- she is not showing any significant progress/or showing regression  towards this goal and reassessment and adjustment of goals/treatment have been made. 2. Improving sleep to 6-7 hours a night. Improve mood/ anxiety and depression symptoms such as crying spells, low energy, problems with concentration, motivation. - she is showing progression towards this treatment goal with the current regimen. 3. Reduction of reliance on opioid analgesia/more appropriate opioid use. - she is showing progression towards this treatment goal with the current regimen. Risks and benefits of the medications and other alternative treatments have been/were  discussed with the patient. Any questions on the  common side effects of these medications were also answered. She was advised against drinking alcohol with the narcotic pain medicines, advised against driving or handling machinery when  starting or adjusting the dose of medicines, feeling groggy or drowsy, or if having any cognitive issues related to the current medications. Sheis fully aware of the risk of overdose and death, if medicines are misused and not taken as prescribed. If she develops new symptoms or if the symptoms worsen, she was told to call the office. .  Thank you for allowing me to participate in the care of this patient.     Randi Cherry MD    Cc: HECTOR Cornel[facility] - [facility]

## 2023-01-18 LAB
ESTIMATED AVERAGE GLUCOSE: 122.6 MG/DL
HBA1C MFR BLD: 5.9 %

## 2023-01-30 ENCOUNTER — OFFICE VISIT (OUTPATIENT)
Dept: FAMILY MEDICINE CLINIC | Age: 78
End: 2023-01-30
Payer: MEDICARE

## 2023-01-30 VITALS
HEIGHT: 65 IN | SYSTOLIC BLOOD PRESSURE: 118 MMHG | RESPIRATION RATE: 16 BRPM | BODY MASS INDEX: 22.96 KG/M2 | OXYGEN SATURATION: 95 % | TEMPERATURE: 97.3 F | WEIGHT: 137.8 LBS | DIASTOLIC BLOOD PRESSURE: 68 MMHG | HEART RATE: 87 BPM

## 2023-01-30 DIAGNOSIS — N30.01 ACUTE CYSTITIS WITH HEMATURIA: Primary | ICD-10-CM

## 2023-01-30 LAB
BILIRUBIN, POC: NORMAL
BLOOD URINE, POC: NORMAL
CLARITY, POC: CLEAR
COLOR, POC: YELLOW
GLUCOSE URINE, POC: NORMAL
KETONES, POC: NORMAL
LEUKOCYTE EST, POC: NORMAL
NITRITE, POC: NORMAL
PH, POC: 6
PROTEIN, POC: NORMAL
SPECIFIC GRAVITY, POC: 1.02
UROBILINOGEN, POC: 1

## 2023-01-30 PROCEDURE — G8399 PT W/DXA RESULTS DOCUMENT: HCPCS | Performed by: NURSE PRACTITIONER

## 2023-01-30 PROCEDURE — G8420 CALC BMI NORM PARAMETERS: HCPCS | Performed by: NURSE PRACTITIONER

## 2023-01-30 PROCEDURE — G8484 FLU IMMUNIZE NO ADMIN: HCPCS | Performed by: NURSE PRACTITIONER

## 2023-01-30 PROCEDURE — 1090F PRES/ABSN URINE INCON ASSESS: CPT | Performed by: NURSE PRACTITIONER

## 2023-01-30 PROCEDURE — 99213 OFFICE O/P EST LOW 20 MIN: CPT | Performed by: NURSE PRACTITIONER

## 2023-01-30 PROCEDURE — 1123F ACP DISCUSS/DSCN MKR DOCD: CPT | Performed by: NURSE PRACTITIONER

## 2023-01-30 PROCEDURE — 81002 URINALYSIS NONAUTO W/O SCOPE: CPT | Performed by: NURSE PRACTITIONER

## 2023-01-30 PROCEDURE — G8427 DOCREV CUR MEDS BY ELIG CLIN: HCPCS | Performed by: NURSE PRACTITIONER

## 2023-01-30 PROCEDURE — 1036F TOBACCO NON-USER: CPT | Performed by: NURSE PRACTITIONER

## 2023-01-30 PROCEDURE — 3017F COLORECTAL CA SCREEN DOC REV: CPT | Performed by: NURSE PRACTITIONER

## 2023-01-30 ASSESSMENT — ENCOUNTER SYMPTOMS
CONSTIPATION: 0
VOMITING: 0
DIARRHEA: 0
NAUSEA: 0
SHORTNESS OF BREATH: 1
ABDOMINAL PAIN: 0
COUGH: 1

## 2023-01-30 NOTE — PROGRESS NOTES
1/30/2023    This is a 78 y.o. female   Chief Complaint   Patient presents with    Dysuria     Has not seen any blood in her urine. Symptoms have resolved. Taras Al JEREMIAH  Patient is here to f/u on hematuria. She was seen on 1/13/23 and reports that she had small amount of blood in her urine daily for a couple of months. Urine culture from 1/13/23 showed strep pyogenes. Treated with amoxicillin 500 mg TID for 10 days and tolerated well. Has not had recurrent hematuria or vaginal discharge. Denies dysuria or urinary frequency.       Patient Active Problem List   Diagnosis    Osteopenia-last dexa 2009 repeat 2015 (-2.4 hip)--advised tx    Colon polyp, hyperplastic,-(done 3/11-repeat 3-5 yrs--dr López Mccarthy)    Family history of colon cancer    CTS (carpal tunnel syndrome)BILATERAL-s/p surgical repair--resolved     Postmenopausal status-last mammogram 2/15 wnl last pap 12/13--wnl    Nondependent alcohol abuse, in remission    Urticaria, chronic    Smoking    Chronic back pain-(djd) saw dr Felisa Weiner afford surgery--seeing dr Johnny Rob for pain meds    Fibromyalgia    Hypercholesteremia    Lumbar spondylosis    Vitamin D deficiency--severe--started 50,000 iu 2x/ wk 11/13    Insomnia--on elevil w help    Constipation--on daily miralax    Depression    Chronic pain syndrome    Muscle cramping    Acute on chronic respiratory failure with hypercapnia ([address]    ROLY (acute kidney injury) ([address]    Severe sepsis (HCC)    Gastroesophageal reflux disease    COPD, severe ([address]    Chronic hypoxemic respiratory failure ([address]    Degeneration of lumbar or lumbosacral intervertebral disc    Trochanteric bursitis of right hip    Diabetes ([address]    Controlled type 2 diabetes mellitus without complication, without long-term current use of insulin ([address]    Age-related osteoporosis without current pathological fracture- started alendronate 9/2021    Pulmonary nodule seen on imaging study    General weakness    Elevated lactic acid level Cavitary pneumonia    Acute respiratory failure with hypoxia (HCC)    Acute on chronic respiratory failure with hypoxia and hypercapnia (HCC)    Abnormal EKG    NSTEMI (non-ST elevated myocardial infarction) (HCC)    Abscess of upper lobe of right lung with pneumonia (HCC)    Pseudomonas respiratory infection    Abnormal CT of the chest    Peripherally inserted central catheter (PICC) in place    Massive hemoptysis    Epistaxis    Acute respiratory insufficiency    Acute blood loss anemia    Hypotension due to drugs    Hypoxia    Mixed restrictive and obstructive lung disease (HCC)          Current Outpatient Medications   Medication Sig Dispense Refill    gabapentin (NEURONTIN) 600 MG tablet Take 1 tablet by mouth 2 times daily for 30 days. 60 tablet 1    pantoprazole (PROTONIX) 40 MG tablet Take 1 tablet by mouth daily 30 tablet 1    oxyCODONE-acetaminophen (PERCOCET) 7.5-325 MG per tablet Take 1 tablet by mouth 3 times daily for 28 days. 84 tablet 0    meloxicam (MOBIC) 15 MG tablet Take 1 tablet po on Mon, Wed, Fri 30 tablet 0    atorvastatin (LIPITOR) 80 MG tablet TAKE 1 TABLET BY MOUTH EVERY DAY FOR CHOLESTEROL 90 tablet 1    DULoxetine (CYMBALTA) 60 MG extended release capsule TAKE 1 CAPSULE BY MOUTH TWICE A  capsule 0    traZODone (DESYREL) 50 MG tablet Take 1-2 tablets by mouth nightly 60 tablet 1    prasugrel (EFFIENT) 10 MG TABS Take 1 tablet by mouth daily 90 tablet 3    ipratropium-albuterol (DUONEB) 0.5-2.5 (3) MG/3ML SOLN nebulizer solution Take 1 aerosol every 4-6 hours as needed for shortness of breath coughing and wheezing 360 mL 0    Fluticasone-Umeclidin-Vilant (TRELEGY ELLIPTA) 200-62.5-25 MCG/INH AEPB Inhale 1 puff into the lungs daily 1 each 11    alendronate (FOSAMAX) 70 MG tablet TAKE 1 TABLET BY MOUTH ONE TIME PER WEEK 12 tablet 1    aspirin 81 MG EC tablet Take 1 tablet by mouth in the morning.  30 tablet 3    albuterol sulfate  (90 Base) MCG/ACT inhaler Inhale 2 puffs into the lungs every 6 hours as needed for Shortness of Breath 1 each 11    Calcium Citrate-Vitamin D 500-500 MG-UNIT CHEW Take 1 tablet by mouth 2 times daily      ONE TOUCH LANCETS MISC 1 each by Does not apply route daily 100 each 3    Cholecalciferol (VITAMIN D) 2000 units TABS tablet Take 1 tablet by mouth daily 30 tablet     cetirizine (ZYRTEC) 10 MG tablet Take 10 mg by mouth daily as needed for Allergies      Nebulizers (COMPRESSOR/NEBULIZER) MISC 1 Units by Does not apply route 4 times daily 1 each 3     Current Facility-Administered Medications   Medication Dose Route Frequency Provider Last Rate Last Admin    triamcinolone acetonide (KENALOG-40) injection 40 mg  40 mg IntraMUSCular Once Cecy aPrekh MD           No Known Allergies    Review of Systems   Constitutional:  Negative for activity change and fever. Respiratory:  Positive for cough and shortness of breath. Chronic due to COPD- denies worsening from baseline    Cardiovascular:  Negative for chest pain. Gastrointestinal:  Negative for abdominal pain, constipation, diarrhea, nausea and vomiting. Genitourinary:  Negative for difficulty urinating, dysuria, flank pain, frequency, hematuria, pelvic pain and vaginal discharge. Vitals:    01/30/23 1141   BP: 118/68   Site: Right Upper Arm   Position: Sitting   Cuff Size: Medium Adult   Pulse: 87   Resp: 16   Temp: 97.3 °F (36.3 °C)   TempSrc: Oral   SpO2: 95%   Weight: 137 lb 12.8 oz (62.5 kg)   Height: 5' 5\" (1.651 m)       Body mass index is 22.93 kg/m². Wt Readings from Last 3 Encounters:   01/30/23 137 lb 12.8 oz (62.5 kg)   01/17/23 134 lb (60.8 kg)   01/13/23 133 lb 6.4 oz (60.5 kg)       BP Readings from Last 3 Encounters:   01/30/23 118/68   01/17/23 107/69   01/13/23 130/68       Physical Exam  Vitals and nursing note reviewed. Constitutional:       General: She is not in acute distress. Appearance: She is well-developed. HENT:      Head: Normocephalic and atraumatic. Cardiovascular:      Rate and Rhythm: Normal rate and regular rhythm. Heart sounds: Normal heart sounds. No murmur heard. No friction rub. No gallop. Pulmonary:      Effort: Pulmonary effort is normal. No respiratory distress. Breath sounds: Normal breath sounds. Comments: Faint wheezing upper lobes   Abdominal:      Palpations: Abdomen is soft. Tenderness: There is no abdominal tenderness. There is no right CVA tenderness or left CVA tenderness. Musculoskeletal:      Cervical back: Neck supple. Right lower leg: No edema. Left lower leg: No edema. Skin:     General: Skin is warm and dry. Neurological:      Mental Status: She is alert and oriented to person, place, and time. Psychiatric:         Behavior: Behavior normal.         Thought Content: Thought content normal.         Judgment: Judgment normal.       Assessmentand Plan  Darliss Schilder was seen today for dysuria. Diagnoses and all orders for this visit:    Acute cystitis with hematuria  -     POCT Urinalysis no Micro- trace leukocytes. No blood   -     Culture, Urine  Has completed antibiotic and symptoms have resolved. Will repeat urine culture. Advised to stay hydrated with water. Patient is to call if symptoms return. Return in about 2 months (around 4/13/2023), or if symptoms worsen or fail to improve, for chronic conditions.

## 2023-02-01 DIAGNOSIS — N30.01 ACUTE CYSTITIS WITH HEMATURIA: Primary | ICD-10-CM

## 2023-02-01 LAB
ORGANISM: ABNORMAL
URINE CULTURE, ROUTINE: ABNORMAL

## 2023-02-03 DIAGNOSIS — N30.01 ACUTE CYSTITIS WITH HEMATURIA: ICD-10-CM

## 2023-02-03 LAB
BACTERIA: ABNORMAL /HPF
BILIRUBIN URINE: NEGATIVE
BLOOD, URINE: NEGATIVE
CLARITY: CLEAR
COLOR: YELLOW
EPITHELIAL CELLS, UA: 7 /HPF (ref 0–5)
GLUCOSE URINE: NEGATIVE MG/DL
HYALINE CASTS: 0 /LPF (ref 0–8)
KETONES, URINE: NEGATIVE MG/DL
LEUKOCYTE ESTERASE, URINE: ABNORMAL
MICROSCOPIC EXAMINATION: YES
NITRITE, URINE: NEGATIVE
PH UA: 6.5 (ref 5–8)
PROTEIN UA: NEGATIVE MG/DL
RBC UA: 1 /HPF (ref 0–4)
SPECIFIC GRAVITY UA: 1.01 (ref 1–1.03)
URINE TYPE: ABNORMAL
UROBILINOGEN, URINE: 1 E.U./DL
WBC UA: 2 /HPF (ref 0–5)

## 2023-02-05 LAB — URINE CULTURE, ROUTINE: NORMAL

## 2023-02-14 ENCOUNTER — OFFICE VISIT (OUTPATIENT)
Dept: PAIN MANAGEMENT | Age: 78
End: 2023-02-14
Payer: MEDICARE

## 2023-02-14 VITALS
DIASTOLIC BLOOD PRESSURE: 70 MMHG | WEIGHT: 136.6 LBS | SYSTOLIC BLOOD PRESSURE: 123 MMHG | HEART RATE: 64 BPM | OXYGEN SATURATION: 90 % | BODY MASS INDEX: 22.73 KG/M2

## 2023-02-14 DIAGNOSIS — M51.36 DDD (DEGENERATIVE DISC DISEASE), LUMBAR: ICD-10-CM

## 2023-02-14 DIAGNOSIS — G89.4 CHRONIC PAIN SYNDROME: ICD-10-CM

## 2023-02-14 DIAGNOSIS — M47.26 OSTEOARTHRITIS OF SPINE WITH RADICULOPATHY, LUMBAR REGION: ICD-10-CM

## 2023-02-14 DIAGNOSIS — F33.1 MODERATE EPISODE OF RECURRENT MAJOR DEPRESSIVE DISORDER (HCC): ICD-10-CM

## 2023-02-14 DIAGNOSIS — M70.61 TROCHANTERIC BURSITIS OF RIGHT HIP: ICD-10-CM

## 2023-02-14 DIAGNOSIS — R25.2 MUSCLE CRAMPING: ICD-10-CM

## 2023-02-14 DIAGNOSIS — M51.37 DEGENERATION OF LUMBAR OR LUMBOSACRAL INTERVERTEBRAL DISC: ICD-10-CM

## 2023-02-14 DIAGNOSIS — K21.9 GASTROESOPHAGEAL REFLUX DISEASE WITHOUT ESOPHAGITIS: ICD-10-CM

## 2023-02-14 DIAGNOSIS — F51.01 PRIMARY INSOMNIA: ICD-10-CM

## 2023-02-14 DIAGNOSIS — M79.7 FIBROMYALGIA: ICD-10-CM

## 2023-02-14 DIAGNOSIS — M47.816 LUMBAR SPONDYLOSIS: ICD-10-CM

## 2023-02-14 PROCEDURE — 1036F TOBACCO NON-USER: CPT | Performed by: INTERNAL MEDICINE

## 2023-02-14 PROCEDURE — 1123F ACP DISCUSS/DSCN MKR DOCD: CPT | Performed by: INTERNAL MEDICINE

## 2023-02-14 PROCEDURE — 1090F PRES/ABSN URINE INCON ASSESS: CPT | Performed by: INTERNAL MEDICINE

## 2023-02-14 PROCEDURE — 99214 OFFICE O/P EST MOD 30 MIN: CPT | Performed by: INTERNAL MEDICINE

## 2023-02-14 PROCEDURE — G8420 CALC BMI NORM PARAMETERS: HCPCS | Performed by: INTERNAL MEDICINE

## 2023-02-14 PROCEDURE — G8484 FLU IMMUNIZE NO ADMIN: HCPCS | Performed by: INTERNAL MEDICINE

## 2023-02-14 PROCEDURE — G8399 PT W/DXA RESULTS DOCUMENT: HCPCS | Performed by: INTERNAL MEDICINE

## 2023-02-14 PROCEDURE — G8427 DOCREV CUR MEDS BY ELIG CLIN: HCPCS | Performed by: INTERNAL MEDICINE

## 2023-02-14 PROCEDURE — 3017F COLORECTAL CA SCREEN DOC REV: CPT | Performed by: INTERNAL MEDICINE

## 2023-02-14 RX ORDER — OXYCODONE AND ACETAMINOPHEN 7.5; 325 MG/1; MG/1
1 TABLET ORAL 3 TIMES DAILY
Qty: 84 TABLET | Refills: 0 | Status: SHIPPED | OUTPATIENT
Start: 2023-02-14 | End: 2023-03-14

## 2023-02-14 RX ORDER — TRAZODONE HYDROCHLORIDE 50 MG/1
50-100 TABLET ORAL NIGHTLY
Qty: 60 TABLET | Refills: 1 | Status: SHIPPED | OUTPATIENT
Start: 2023-02-14

## 2023-02-14 RX ORDER — GABAPENTIN 600 MG/1
600 TABLET ORAL 2 TIMES DAILY
Qty: 60 TABLET | Refills: 1 | Status: SHIPPED | OUTPATIENT
Start: 2023-02-14 | End: 2023-03-16

## 2023-02-14 RX ORDER — MELOXICAM 15 MG/1
TABLET ORAL
Qty: 30 TABLET | Refills: 0 | Status: SHIPPED | OUTPATIENT
Start: 2023-02-14

## 2023-02-14 RX ORDER — PANTOPRAZOLE SODIUM 40 MG/1
40 TABLET, DELAYED RELEASE ORAL DAILY
Qty: 30 TABLET | Refills: 1 | Status: SHIPPED | OUTPATIENT
Start: 2023-02-14

## 2023-02-20 NOTE — PROGRESS NOTES
Elfrieda Rule  1945  [id number]    HISTORY OF PRESENT ILLNESS:  Ms. Bo Vanessa is a 78 y.o. female returns for a follow up visit for multiple medical problems. Her  presenting problems are   1. Osteoarthritis of spine with radiculopathy, lumbar region    2. Chronic pain syndrome    3. Lumbar spondylosis    4. Degeneration of lumbar or lumbosacral intervertebral disc    5. Fibromyalgia    6. DDD (degenerative disc disease), lumbar    7. Trochanteric bursitis of right hip    8. Moderate episode of recurrent major depressive disorder ([address]    9. Gastroesophageal reflux disease without esophagitis    10. Primary insomnia    11. Muscle cramping    . As per information/history obtained from the PADT(patient assessment and documentation tool) -  She complains of pain in the lower back with radiation to the buttocks and hips Right She rates the pain 6/10 and describes it as aching, burning. Pain is made worse by: standing. Current treatment regimen has helped relieve about 50% of the pain. She denies side effects from the current pain regimen. Patient reports that since the last follow up visit the physical functioning is unchanged, family/social relationships are unchanged, mood is unchanged and sleep patterns are unchanged, and that the overall functioning is unchanged. Patient denies neurological bowel or bladder. Patient denies misusing/abusing her narcotic pain medications or using any illegal drugs. There are No indicators for possible drug abuse, addiction or diversion problems. Upon obtaining the medical history from Ms. Bo Vanessa regarding today's office visit for her presenting problems, patient states she has been doing fair, she is managing with the medications. Ms. Bo Vanessa states she is using all her adjuvants. Patient says the pain is greater in the back than the legs. Patient denies any constipation symptoms. She reports her breathing has been okay, she is on oxygen PRN at night.  Patient states her sleep is fair. Has fairly normal sleep latency. Averages about 4-6 hours of sleep a night. Denies any signs of sleep apnea. Feels somewhat rested in the morning, she is on Trazodone. Patient's  subjective report of her mood is fair. she describes occasional symptoms of depression, occasional  irritability and some mood swings. Describes her mood as being neutral and reports some pleasure in her daily activities. Reports  fair  appetite, energy and concentration. Able to function well in different aspects of her daily activities. Denies suicidal or homicidal ideation. Denies any complaints of increased tension, does   Worry sometimes and occasional  irritability  she denies any c/o increased anxiety, No c/o panic attacks or symptoms of PTSD, she is on Cymbalta 60 mg. Denies abdominal pain, dysphagia, gas bloat, heartburn, nausea, odynophagia, regurgitation, vomiting and water brash-GERD Sxs are controlled  with the medications, she is on Protonix. ALLERGIES/PAST MED/FAM/SOC HISTORY: Ms. Praveen Wei allergies, past medical, family and social history were reviewed in the chart. Ms. Praveen Wei current medications are   Outpatient Medications Prior to Visit   Medication Sig Dispense Refill    atorvastatin (LIPITOR) 80 MG tablet TAKE 1 TABLET BY MOUTH EVERY DAY FOR CHOLESTEROL 90 tablet 1    DULoxetine (CYMBALTA) 60 MG extended release capsule TAKE 1 CAPSULE BY MOUTH TWICE A  capsule 0    prasugrel (EFFIENT) 10 MG TABS Take 1 tablet by mouth daily 90 tablet 3    ipratropium-albuterol (DUONEB) 0.5-2.5 (3) MG/3ML SOLN nebulizer solution Take 1 aerosol every 4-6 hours as needed for shortness of breath coughing and wheezing 360 mL 0    Fluticasone-Umeclidin-Vilant (TRELEGY ELLIPTA) 200-62.5-25 MCG/INH AEPB Inhale 1 puff into the lungs daily 1 each 11    alendronate (FOSAMAX) 70 MG tablet TAKE 1 TABLET BY MOUTH ONE TIME PER WEEK 12 tablet 1    aspirin 81 MG EC tablet Take 1 tablet by mouth in the morning.  30 tablet 3 albuterol sulfate  (90 Base) MCG/ACT inhaler Inhale 2 puffs into the lungs every 6 hours as needed for Shortness of Breath 1 each 11    Calcium Citrate-Vitamin D 500-500 MG-UNIT CHEW Take 1 tablet by mouth 2 times daily      ONE TOUCH LANCETS MISC 1 each by Does not apply route daily 100 each 3    Cholecalciferol (VITAMIN D) 2000 units TABS tablet Take 1 tablet by mouth daily 30 tablet     cetirizine (ZYRTEC) 10 MG tablet Take 10 mg by mouth daily as needed for Allergies      Nebulizers (COMPRESSOR/NEBULIZER) MISC 1 Units by Does not apply route 4 times daily 1 each 3    gabapentin (NEURONTIN) 600 MG tablet Take 1 tablet by mouth 2 times daily for 30 days. 60 tablet 1    pantoprazole (PROTONIX) 40 MG tablet Take 1 tablet by mouth daily 30 tablet 1    oxyCODONE-acetaminophen (PERCOCET) 7.5-325 MG per tablet Take 1 tablet by mouth 3 times daily for 28 days. 84 tablet 0    meloxicam (MOBIC) 15 MG tablet Take 1 tablet po on Mon, Wed, Fri 30 tablet 0    traZODone (DESYREL) 50 MG tablet Take 1-2 tablets by mouth nightly 60 tablet 1     Facility-Administered Medications Prior to Visit   Medication Dose Route Frequency Provider Last Rate Last Admin    triamcinolone acetonide (KENALOG-40) injection 40 mg  40 mg IntraMUSCular Once Starla Power MD           REVIEW OF SYSTEMS: .   Respiratory: Negative for shortness of breath. Cardiovascular: Negative for chest pain, palpitations  Gastrointestinal: Negative for blood in stool, abdominal distention, nausea, vomiting, abdominal pain, diarrhea,constipation. Neurological: Negative for speech difficulty, weakness and light-headedness, dizziness, tremors, sleepiness  Psychiatric/Behavioral: Negative for suicidal ideas, hallucinations, behavioral problems, self-injury, decreased concentration/cognition, agitation, confusion. PHYSICAL EXAM:   Nursing note and vitals reviewed.  /70   Pulse 64   Wt 136 lb 9.6 oz (62 kg)   SpO2 90%   BMI 22.73 kg/m²   General Appearance: Patient is well nourished, well developed, well groomed and in no acute distress. Skin: Skin is warm and dry, good turgor . No rash or lesions noted. She is not diaphoretic. Pulmonary/Chest: Effort normal. No respiratory distress or use of accessory muscles. Auscultation revealing normal air entry. She does not have wheezes in the lung fields. She has no rales. Cardiovascular: Normal rate, regular rhythm, normal heart sounds, and does not have murmur. Exam reveals no gallop and no friction rub. Musculoskeletal / Extremities: Range of motion is normal. Gait is normal, assistive devices use: none. She exhibits edema: none, and no tenderness. Neurological/Psychiatric:She is alert and oriented to person, place, and time. Coordination is  normal.   Judgement and Insight is normal  Her mood is Appropriate and affect is Neutral/Euthymic(normal) . Her behavior is normal.   thought content normal.        IMPRESSION:     1. Osteoarthritis of spine with radiculopathy, lumbar region - STABLE: Continue current treatment plan    2. Chronic pain syndrome - STABLE: Continue current treatment plan    3. Lumbar spondylosis - STABLE: Continue current treatment plan    4. Degeneration of lumbar or lumbosacral intervertebral disc - STABLE: Continue current treatment plan    5. Fibromyalgia - STABLE: Continue current treatment plan    6. DDD (degenerative disc disease), lumbar - STABLE: Continue current treatment plan    7. Trochanteric bursitis of right hip - STABLE: Continue current treatment plan    8. Moderate episode of recurrent major depressive disorder (HCC) - STABLE: Continue current treatment plan    9. Gastroesophageal reflux disease without esophagitis - STABLE: Continue current treatment plan    10. Primary insomnia - STABLE: Continue current treatment plan    11. Muscle cramping - STABLE: Continue current treatment plan        PLAN:  Informed verbal consent was obtained.   -Urine drug screen with GC/MS for opiates and drugs of abuse was ordered and will follow up on results. -ROM exercises and walking as tolerated   -She was advised to increase fluids ( 5-7  glasses of fluid daily), limit caffeine, avoid cheese products, increase dietary fiber, increase activity and exercise as tolerated and relax regularly and enjoy meals   -Pulmonary rehab exercises   -she was advised proper sleep hygiene-told to avoid:use of caffeine or other stimulants after noon, alcohol use near bedtime, long or frequent naps during the day, erratic sleep schedule, heavy meals near bedtime, vigorous exercise near bedtime and use of electronic devices near bedtime   -Continue with Trazodone 50 mg 1-2 at night   -CBT techniques- relaxation therapies such as biofeedback, mindfulness based stress reduction, imagery, cognitive restructuring, problem solving discussed with patient   -Continue with Cymbalta 60 mg   -Advised caffeine reduction, dietary changes, elevate head end of bed, NPO after supper, if using alcohol advised reduction of alcohol intake, tobacco cessation if smoking, weight loss   -Continue with Protonix   -Continue with Neurontin 1200 mg   -Can use Mobic 2-3 per day   Ms. Leif Hallman will be prescribed  the medications  listed below which are for treatment of her presenting  medical problems which for this visit include:   Diagnoses of Osteoarthritis of spine with radiculopathy, lumbar region, Chronic pain syndrome, Lumbar spondylosis, Degeneration of lumbar or lumbosacral intervertebral disc, Fibromyalgia, DDD (degenerative disc disease), lumbar, Trochanteric bursitis of right hip, Moderate episode of recurrent major depressive disorder ([address] Gastroesophageal reflux disease without esophagitis, Primary insomnia, and Muscle cramping were pertinent to this visit.   Medications/orders associated with this visit:    Current Outpatient Medications   Medication Sig Dispense Refill    oxyCODONE-acetaminophen (PERCOCET) 7.5-325 MG per tablet Take 1 tablet by mouth 3 times daily for 28 days. 84 tablet 0    gabapentin (NEURONTIN) 600 MG tablet Take 1 tablet by mouth 2 times daily for 30 days. 60 tablet 1    pantoprazole (PROTONIX) 40 MG tablet Take 1 tablet by mouth daily 30 tablet 1    meloxicam (MOBIC) 15 MG tablet Take 1 tablet po on Mon, Wed, Fri 30 tablet 0    traZODone (DESYREL) 50 MG tablet Take 1-2 tablets by mouth nightly 60 tablet 1    atorvastatin (LIPITOR) 80 MG tablet TAKE 1 TABLET BY MOUTH EVERY DAY FOR CHOLESTEROL 90 tablet 1    DULoxetine (CYMBALTA) 60 MG extended release capsule TAKE 1 CAPSULE BY MOUTH TWICE A  capsule 0    prasugrel (EFFIENT) 10 MG TABS Take 1 tablet by mouth daily 90 tablet 3    ipratropium-albuterol (DUONEB) 0.5-2.5 (3) MG/3ML SOLN nebulizer solution Take 1 aerosol every 4-6 hours as needed for shortness of breath coughing and wheezing 360 mL 0    Fluticasone-Umeclidin-Vilant (TRELEGY ELLIPTA) 200-62.5-25 MCG/INH AEPB Inhale 1 puff into the lungs daily 1 each 11    alendronate (FOSAMAX) 70 MG tablet TAKE 1 TABLET BY MOUTH ONE TIME PER WEEK 12 tablet 1    aspirin 81 MG EC tablet Take 1 tablet by mouth in the morning.  30 tablet 3    albuterol sulfate  (90 Base) MCG/ACT inhaler Inhale 2 puffs into the lungs every 6 hours as needed for Shortness of Breath 1 each 11    Calcium Citrate-Vitamin D 500-500 MG-UNIT CHEW Take 1 tablet by mouth 2 times daily      ONE TOUCH LANCETS MISC 1 each by Does not apply route daily 100 each 3    Cholecalciferol (VITAMIN D) 2000 units TABS tablet Take 1 tablet by mouth daily 30 tablet     cetirizine (ZYRTEC) 10 MG tablet Take 10 mg by mouth daily as needed for Allergies      Nebulizers (COMPRESSOR/NEBULIZER) MISC 1 Units by Does not apply route 4 times daily 1 each 3     Current Facility-Administered Medications   Medication Dose Route Frequency Provider Last Rate Last Admin    triamcinolone acetonide (KENALOG-40) injection 40 mg  40 mg IntraMUSCular Once Babatunde Terry Baldwin MD            Goals of current treatment regimen include improvement in pain, restoration of functioning- with focus on improvement in physical performance, general activity, work or disability,emotional distress, health care utilization and  decreased medication consumption. Will continue to monitor progress towards achieving/maintaining therapeutic goals with special emphasis on  1. Improvement in perceived interfernce  of pain with ADL's. Ability to do home exercises independently. Ability to do household chores indoor and/or outdoor work and social and leisure activities. To increase flexibility/ROM, strength and endurance. Improve psychosocial and physical functioning.- she is not showing any significant progress/or showing regression  towards this goal and reassessment and adjustment of goals/treatment have been made. 2. Improving sleep to 6-7 hours a night. Improve mood/ anxiety and depression symptoms such as crying spells, low energy, problems with concentration, motivation. - she is showing progression towards this treatment goal with the current regimen. 3. Reduction of reliance on opioid analgesia/more appropriate opioid use. - she is showing progression towards this treatment goal with the current regimen. Risks and benefits of the medications and other alternative treatments have been/were  discussed with the patient. Any questions on the  common side effects of these medications were also answered. She was advised against drinking alcohol with the narcotic pain medicines, advised against driving or handling machinery when  starting or adjusting the dose of medicines, feeling groggy or drowsy, or if having any cognitive issues related to the current medications. Sheis fully aware of the risk of overdose and death, if medicines are misused and not taken as prescribed. If she develops new symptoms or if the symptoms worsen, she was told to call the office.  .  Thank you for allowing me to participate in the care of this patient.     Scarlet Yin MD    Cc: HECTOR Trejo - CNP

## 2023-02-24 ENCOUNTER — TELEPHONE (OUTPATIENT)
Dept: FAMILY MEDICINE CLINIC | Age: 78
End: 2023-02-24

## 2023-02-24 ENCOUNTER — OFFICE VISIT (OUTPATIENT)
Dept: FAMILY MEDICINE CLINIC | Age: 78
End: 2023-02-24
Payer: MEDICARE

## 2023-02-24 VITALS
OXYGEN SATURATION: 92 % | DIASTOLIC BLOOD PRESSURE: 58 MMHG | HEART RATE: 87 BPM | SYSTOLIC BLOOD PRESSURE: 110 MMHG | WEIGHT: 136.2 LBS | RESPIRATION RATE: 16 BRPM | BODY MASS INDEX: 22.69 KG/M2 | HEIGHT: 65 IN | TEMPERATURE: 98.1 F

## 2023-02-24 DIAGNOSIS — N30.01 ACUTE CYSTITIS WITH HEMATURIA: Primary | ICD-10-CM

## 2023-02-24 PROCEDURE — G8484 FLU IMMUNIZE NO ADMIN: HCPCS | Performed by: NURSE PRACTITIONER

## 2023-02-24 PROCEDURE — 1036F TOBACCO NON-USER: CPT | Performed by: NURSE PRACTITIONER

## 2023-02-24 PROCEDURE — 99213 OFFICE O/P EST LOW 20 MIN: CPT | Performed by: NURSE PRACTITIONER

## 2023-02-24 PROCEDURE — 1090F PRES/ABSN URINE INCON ASSESS: CPT | Performed by: NURSE PRACTITIONER

## 2023-02-24 PROCEDURE — G8399 PT W/DXA RESULTS DOCUMENT: HCPCS | Performed by: NURSE PRACTITIONER

## 2023-02-24 PROCEDURE — G8420 CALC BMI NORM PARAMETERS: HCPCS | Performed by: NURSE PRACTITIONER

## 2023-02-24 PROCEDURE — 1123F ACP DISCUSS/DSCN MKR DOCD: CPT | Performed by: NURSE PRACTITIONER

## 2023-02-24 PROCEDURE — G8427 DOCREV CUR MEDS BY ELIG CLIN: HCPCS | Performed by: NURSE PRACTITIONER

## 2023-02-24 PROCEDURE — 3017F COLORECTAL CA SCREEN DOC REV: CPT | Performed by: NURSE PRACTITIONER

## 2023-02-24 RX ORDER — CEFUROXIME AXETIL 250 MG/1
250 TABLET ORAL 2 TIMES DAILY
Qty: 20 TABLET | Refills: 0 | Status: SHIPPED | OUTPATIENT
Start: 2023-02-24 | End: 2023-03-06

## 2023-02-24 SDOH — ECONOMIC STABILITY: INCOME INSECURITY: HOW HARD IS IT FOR YOU TO PAY FOR THE VERY BASICS LIKE FOOD, HOUSING, MEDICAL CARE, AND HEATING?: NOT HARD AT ALL

## 2023-02-24 SDOH — ECONOMIC STABILITY: HOUSING INSECURITY
IN THE LAST 12 MONTHS, WAS THERE A TIME WHEN YOU DID NOT HAVE A STEADY PLACE TO SLEEP OR SLEPT IN A SHELTER (INCLUDING NOW)?: NO

## 2023-02-24 SDOH — ECONOMIC STABILITY: FOOD INSECURITY: WITHIN THE PAST 12 MONTHS, THE FOOD YOU BOUGHT JUST DIDN'T LAST AND YOU DIDN'T HAVE MONEY TO GET MORE.: NEVER TRUE

## 2023-02-24 SDOH — ECONOMIC STABILITY: FOOD INSECURITY: WITHIN THE PAST 12 MONTHS, YOU WORRIED THAT YOUR FOOD WOULD RUN OUT BEFORE YOU GOT MONEY TO BUY MORE.: NEVER TRUE

## 2023-02-24 ASSESSMENT — ENCOUNTER SYMPTOMS
SHORTNESS OF BREATH: 1
COUGH: 1
VOMITING: 0
NAUSEA: 0

## 2023-02-24 NOTE — PROGRESS NOTES
2/24/2023    This is a 78 y.o. female   Chief Complaint   Patient presents with    Dysuria     Started yesterday morning. Burning. Frequency. Pressure. .    Dysuria   This is a recurrent problem. The current episode started yesterday. The problem occurs intermittently. The problem has been gradually worsening. The pain is at a severity of 7/10. The pain is moderate. There has been no fever. There is No history of pyelonephritis. Associated symptoms include frequency and hematuria. Pertinent negatives include no flank pain, nausea or vomiting. Treatments tried: AZO. The treatment provided mild relief. Her past medical history is significant for recurrent UTIs. There is no history of kidney stones. Noticed that she had blood on tissue when she wiped. Has taken AZO with improvement in the dysuria.       Last urine culture on 2/3/23 showed mixed skin/urogenital fabiana        Patient Active Problem List   Diagnosis    Osteopenia-last dexa 2009 repeat 2015 (-2.4 hip)--advised tx    Colon polyp, hyperplastic,-(done 3/11-repeat 3-5 yrs--dr Mallorie Alex)    Family history of colon cancer    CTS (carpal tunnel syndrome)BILATERAL-s/p surgical repair--resolved     Postmenopausal status-last mammogram 2/15 wnl last pap 12/13--wnl    Nondependent alcohol abuse, in remission    Urticaria, chronic    Smoking    Chronic back pain-(djd) saw dr Robin Smith afford surgery--seeing dr Braulio Plasencia for pain meds    Fibromyalgia    Hypercholesteremia    Lumbar spondylosis    Vitamin D deficiency--severe--started 50,000 iu 2x/ wk 11/13    Insomnia--on elevil w help    Constipation--on daily miralax    Depression    Chronic pain syndrome    Muscle cramping    Acute on chronic respiratory failure with hypercapnia (HCC)    ROLY (acute kidney injury) ([address]    Severe sepsis (HCC)    Gastroesophageal reflux disease    COPD, severe ([address]    Chronic hypoxemic respiratory failure ([address]    Degeneration of lumbar or lumbosacral intervertebral disc Trochanteric bursitis of right hip    Diabetes ([facility]    Controlled type 2 diabetes mellitus without complication, without long-term current use of insulin (HCC)    Age-related osteoporosis without current pathological fracture- started alendronate 9/2021    Pulmonary nodule seen on imaging study    General weakness    Elevated lactic acid level    Cavitary pneumonia    Acute respiratory failure with hypoxia (HCC)    Acute on chronic respiratory failure with hypoxia and hypercapnia (HCC)    Abnormal EKG    NSTEMI (non-ST elevated myocardial infarction) (HCC)    Abscess of upper lobe of right lung with pneumonia (HCC)    Pseudomonas respiratory infection    Abnormal CT of the chest    Peripherally inserted central catheter (PICC) in place    Massive hemoptysis    Epistaxis    Acute respiratory insufficiency    Acute blood loss anemia    Hypotension due to drugs    Hypoxia    Mixed restrictive and obstructive lung disease ([facility]       Current Outpatient Medications   Medication Sig Dispense Refill    oxyCODONE-acetaminophen (PERCOCET) 7.5-325 MG per tablet Take 1 tablet by mouth 3 times daily for 28 days. 84 tablet 0    gabapentin (NEURONTIN) 600 MG tablet Take 1 tablet by mouth 2 times daily for 30 days.  60 tablet 1    pantoprazole (PROTONIX) 40 MG tablet Take 1 tablet by mouth daily 30 tablet 1    meloxicam (MOBIC) 15 MG tablet Take 1 tablet po on Mon, Wed, Fri 30 tablet 0    traZODone (DESYREL) 50 MG tablet Take 1-2 tablets by mouth nightly 60 tablet 1    atorvastatin (LIPITOR) 80 MG tablet TAKE 1 TABLET BY MOUTH EVERY DAY FOR CHOLESTEROL 90 tablet 1    DULoxetine (CYMBALTA) 60 MG extended release capsule TAKE 1 CAPSULE BY MOUTH TWICE A  capsule 0    prasugrel (EFFIENT) 10 MG TABS Take 1 tablet by mouth daily 90 tablet 3    ipratropium-albuterol (DUONEB) 0.5-2.5 (3) MG/3ML SOLN nebulizer solution Take 1 aerosol every 4-6 hours as needed for shortness of breath coughing and wheezing 360 mL 0 Fluticasone-Umeclidin-Vilant (TRELEGY ELLIPTA) 200-62.5-25 MCG/INH AEPB Inhale 1 puff into the lungs daily 1 each 11    alendronate (FOSAMAX) 70 MG tablet TAKE 1 TABLET BY MOUTH ONE TIME PER WEEK 12 tablet 1    aspirin 81 MG EC tablet Take 1 tablet by mouth in the morning. 30 tablet 3    albuterol sulfate  (90 Base) MCG/ACT inhaler Inhale 2 puffs into the lungs every 6 hours as needed for Shortness of Breath 1 each 11    Calcium Citrate-Vitamin D 500-500 MG-UNIT CHEW Take 1 tablet by mouth 2 times daily      ONE TOUCH LANCETS MISC 1 each by Does not apply route daily 100 each 3    Cholecalciferol (VITAMIN D) 2000 units TABS tablet Take 1 tablet by mouth daily 30 tablet     cetirizine (ZYRTEC) 10 MG tablet Take 10 mg by mouth daily as needed for Allergies      Nebulizers (COMPRESSOR/NEBULIZER) MISC 1 Units by Does not apply route 4 times daily 1 each 3     Current Facility-Administered Medications   Medication Dose Route Frequency Provider Last Rate Last Admin    triamcinolone acetonide (KENALOG-40) injection 40 mg  40 mg IntraMUSCular Once Jhon James MD           No Known Allergies    Review of Systems   Constitutional:  Negative for fever. Respiratory:  Positive for cough and shortness of breath. Chronic cough and shortness of breath- denies worsening from baseline. Cardiovascular:  Negative for chest pain. Gastrointestinal:  Negative for nausea and vomiting. Genitourinary:  Positive for dysuria, frequency and hematuria. Negative for difficulty urinating, flank pain, pelvic pain and vaginal discharge. Vitals:    02/24/23 1413   BP: (!) 110/58   Site: Right Upper Arm   Position: Sitting   Cuff Size: Medium Adult   Pulse: 87   Resp: 16   Temp: 98.1 °F (36.7 °C)   TempSrc: Oral   SpO2: 92%   Weight: 136 lb 3.2 oz (61.8 kg)   Height: 5' 5\" (1.651 m)       Body mass index is 22.66 kg/m².      Wt Readings from Last 3 Encounters:   02/24/23 136 lb 3.2 oz (61.8 kg)   02/14/23 136 lb 9.6 oz (62 kg)   01/30/23 137 lb 12.8 oz (62.5 kg)       BP Readings from Last 3 Encounters:   02/24/23 (!) 110/58   02/14/23 123/70   01/30/23 118/68       Physical Exam  Vitals and nursing note reviewed. Constitutional:       General: She is not in acute distress. Appearance: Normal appearance. She is well-developed. HENT:      Head: Normocephalic and atraumatic. Cardiovascular:      Rate and Rhythm: Normal rate and regular rhythm. Heart sounds: Normal heart sounds. No murmur heard. No friction rub. No gallop. Pulmonary:      Effort: Pulmonary effort is normal.      Breath sounds: Normal breath sounds. Abdominal:      Palpations: Abdomen is soft. Tenderness: There is abdominal tenderness in the suprapubic area. There is right CVA tenderness and left CVA tenderness. There is no guarding or rebound. Musculoskeletal:      Cervical back: Neck supple. Skin:     General: Skin is warm and dry. Neurological:      Mental Status: She is alert and oriented to person, place, and time. Psychiatric:         Behavior: Behavior normal.         Thought Content: Thought content normal.         Judgment: Judgment normal.       Assessmentand Plan  Suzie Chavis was seen today for dysuria. Diagnoses and all orders for this visit:    Acute cystitis with hematuria  -     Culture, Urine  -     cefUROXime (CEFTIN) 250 MG tablet; Take 1 tablet by mouth 2 times daily for 10 days  Patient took AZO which stains the urine, so unable to complete urine dipstick in office today. Will obtain urine culture and start antibiotic  Advised to rest and hydrate with water  Advised to have evaluation with urologist due to recurrent urinary symptoms. Has a previous referral to [facility]. Return in about 2 weeks (around 3/10/2023), or if symptoms worsen or fail to improve, for UTI.

## 2023-02-24 NOTE — TELEPHONE ENCOUNTER
LOV 01/30/2023   Pt has UTI symptoms started yesterday   Pt has burning and going to bathroom a lot   Pt wants to know can we put a order sos he can run down to the lab?

## 2023-02-26 LAB — URINE CULTURE, ROUTINE: NORMAL

## 2023-02-27 ENCOUNTER — TELEPHONE (OUTPATIENT)
Dept: FAMILY MEDICINE CLINIC | Age: 78
End: 2023-02-27

## 2023-02-27 ENCOUNTER — OFFICE VISIT (OUTPATIENT)
Dept: CARDIOLOGY CLINIC | Age: 78
End: 2023-02-27
Payer: MEDICARE

## 2023-02-27 VITALS
OXYGEN SATURATION: 94 % | HEIGHT: 64 IN | BODY MASS INDEX: 23.56 KG/M2 | SYSTOLIC BLOOD PRESSURE: 120 MMHG | HEART RATE: 60 BPM | DIASTOLIC BLOOD PRESSURE: 62 MMHG | WEIGHT: 138 LBS

## 2023-02-27 DIAGNOSIS — J44.9 CHRONIC OBSTRUCTIVE PULMONARY DISEASE, UNSPECIFIED COPD TYPE (HCC): ICD-10-CM

## 2023-02-27 DIAGNOSIS — M79.605 BILATERAL LEG PAIN: ICD-10-CM

## 2023-02-27 DIAGNOSIS — I25.10 CORONARY ARTERY DISEASE INVOLVING NATIVE CORONARY ARTERY OF NATIVE HEART WITHOUT ANGINA PECTORIS: ICD-10-CM

## 2023-02-27 DIAGNOSIS — M79.604 BILATERAL LEG PAIN: ICD-10-CM

## 2023-02-27 DIAGNOSIS — E78.5 HYPERLIPIDEMIA LDL GOAL <70: Primary | ICD-10-CM

## 2023-02-27 PROCEDURE — 99213 OFFICE O/P EST LOW 20 MIN: CPT | Performed by: INTERNAL MEDICINE

## 2023-02-27 PROCEDURE — 3023F SPIROM DOC REV: CPT | Performed by: INTERNAL MEDICINE

## 2023-02-27 PROCEDURE — G8427 DOCREV CUR MEDS BY ELIG CLIN: HCPCS | Performed by: INTERNAL MEDICINE

## 2023-02-27 PROCEDURE — G8399 PT W/DXA RESULTS DOCUMENT: HCPCS | Performed by: INTERNAL MEDICINE

## 2023-02-27 PROCEDURE — 3017F COLORECTAL CA SCREEN DOC REV: CPT | Performed by: INTERNAL MEDICINE

## 2023-02-27 PROCEDURE — G8484 FLU IMMUNIZE NO ADMIN: HCPCS | Performed by: INTERNAL MEDICINE

## 2023-02-27 PROCEDURE — 1123F ACP DISCUSS/DSCN MKR DOCD: CPT | Performed by: INTERNAL MEDICINE

## 2023-02-27 PROCEDURE — 1036F TOBACCO NON-USER: CPT | Performed by: INTERNAL MEDICINE

## 2023-02-27 PROCEDURE — G8420 CALC BMI NORM PARAMETERS: HCPCS | Performed by: INTERNAL MEDICINE

## 2023-02-27 PROCEDURE — 1090F PRES/ABSN URINE INCON ASSESS: CPT | Performed by: INTERNAL MEDICINE

## 2023-02-27 NOTE — PROGRESS NOTES
Cardiology Consultation     Referring Physician: Juan Manuel Monreal CNP   Reason for Consultation: syncope   Chief Complaint: \"I'm fine\"   Chief Complaint   Patient presents with    6 Month Follow-Up     No cardiac symptoms. Subjective:   History of Present Illness:  Gloria Waters is a 78 y.o. female with past medical history significant for CAD, hyperlipidemia, hypertension, COPD and type 2 diabetes. She presented to [facility] with complaints of worsening shortness of breath following admission for pneumonia. She was found to have NSTEMI and underwent LHC on 22 resulting in KIRILL to [place] [id number]. She develop ongoing respiratory failure with cavitatory pneumonia. She also developed sever epistaxis and antiplatelets were temporarily held during admission. She presented to the ED 22 for recurrent epistaxis that was treated with a Merocel sponge. Today she presents for follow up and states that overall she is feeling well. She denies any new sounding cardiac complaints. She denies any chest pains or worsening shortness of breath. She reports occasional bilateral leg pain. She reports medication compliance and is tolerating. She denies any abnormal bleeding or bruising. She denies exertional chest pain/pressure, dyspnea at rest, worsening OSBORN, PND, orthopnea, palpitations, lightheadedness, weight changes, changes in LE edema, and syncope. Prior cardiac testing:    EK20 SR, ~67 bpm   22 Sinus  Tachycardia. Incomplete right bundle branch block. Nonspecific T-abnormality. [facility] 22  HEMODYNAMICS:  Aortic pressure was 140/80;  LVEDP 16. There was no gradient between the left ventricle and aorta. ANGIOGRAM/CORONARY ARTERIOGRAM:     The left main coronary artery is normal .  The left anterior descending artery has mild diffuse disease . The left circumflex artery is normal              OM1 99% .    The right coronary artery is normal . SUMMARY:   Single vessel CAD      7/21/2022  Selective angiography with PCI:  ANGIOGRAM/CORONARY ARTERIOGRAM:        The left circumflex artery has mild disease              Om1 99%    INTERVENTION  Guide catheter: XB 3.5 Guide  Runthrough wire used to cross OM1 lesion  Predilation with 3.0 regular balllon  IVUS passed to distal OM, reference diameter 3 mm  Haily 3.0 X 15 mm KIRILL  Post dilation with 3.0 NC balloon to 15 ALEXEI      SUMMARY:   Single vessel CAD  S/p successful IVUS guided PCI OM1 X 1 KIRILL      Echo 7/19/2022   Left ventricular cavity size is normal with mild LV hypertrophy and normal systolic function. Ejection fraction is visually estimated to be 55-60%. There are no regional wall motion abnormalities noted. Grade I diastolic dysfunction noted. The right ventricle is normal in size and function. There are no significant valvular abnormalities appreciated in this study. Past Medical History:   has a past medical history of CAD (coronary artery disease), Chronic pain syndrome, Colorectal polyps, COPD (chronic obstructive pulmonary disease) (HCC), CTS (carpal tunnel syndrome), DDD (degenerative disc disease), lumbar, Depression, Family hx of colon cancer, Fibromyalgia, Hyperlipemia, IBS (irritable bowel syndrome), Lumbar spondylosis, Mixed restrictive and obstructive lung disease ([address] New onset type 2 diabetes mellitus ([address] On home O2, Pneumonia, and Urticaria, chronic. Surgical History:   has a past surgical history that includes Tympanostomy tube placement; Cervical polyp removal (09/2004); Carpal tunnel release (Bilateral); Tubal ligation; Intracapsular cataract extraction (Left, 06/23/2020); Intracapsular cataract extraction (Right, 07/14/2020); Coronary angioplasty with stent (07/19/2022); Cataract extraction; bronchoscopy (N/A, 08/03/2022); Diagnostic Cardiac Cath Lab Procedure (Left, 07/19/2022); and Cardiac catheterization (07/21/2022).      Social History:   reports that she quit smoking about 7 months ago. Her smoking use included cigarettes. She started smoking about 61 years ago. She has a 55.00 pack-year smoking history. She has never used smokeless tobacco. She reports that she does not drink alcohol and does not use drugs. Family History:  family history includes Alzheimer's Disease in her mother; Cancer in her father; Diabetes in her daughter, daughter, and daughter; Heart Disease in her brother; Heart Failure in her brother. Allergies:  Patient has no known allergies. Review of Systems: refer to HPI   Constitutional: no unanticipated weight loss. There's been no change in energy level, sleep pattern, or activity level. No fevers, chills. Eyes: No visual changes or diplopia. No scleral icterus. ENT: No Headaches, hearing loss or vertigo. No mouth sores or sore throat. Cardiovascular: as reviewed in HPI  Respiratory: + SOB. No cough or wheezing, no sputum production. No hemoptysis. Gastrointestinal: No abdominal pain, appetite loss, blood in stools. No change in bowel or bladder habits. Genitourinary: No dysuria, trouble voiding, or hematuria. Musculoskeletal:  No gait disturbance, no joint complaints. Integumentary: No rash or pruritis. Neurological: No headache, diplopia, change in muscle strength, numbness or tingling. Psychiatric: No anxiety or depression. Endocrine: No temperature intolerance. No excessive thirst, fluid intake, or urination. No tremor. Hematologic/Lymphatic: No abnormal bruising or bleeding, blood clots or swollen lymph nodes. Allergic/Immunologic: No nasal congestion or hives. Vascular : BLE edema     Objective:   PHYSICAL EXAM:    Vitals:    02/27/23 1121   BP: 120/62   Pulse: 60   SpO2: 94%        Weight: 138 lb (62.6 kg)       General Appearance:  Alert, cooperative, no distress, appears stated age. Head:  Normocephalic, without obvious abnormality, atraumatic. Eyes:  Pupils equal and round. No scleral icterus.    Mouth: Moist mucosa, no pharyngeal erythema. Nose: Nares normal. No drainage or sinus tenderness. Neck: Supple, symmetrical, trachea midline. No adenopathy. No tenderness/mass/nodules. No carotid bruit or elevated JVD. Lungs:   Clear to auscultation bilaterally, respirations unlabored. No wheeze, rales, or rhonchi. Chest Wall:  No tenderness or deformity. Heart:  Regular rate. S1/S2 normal. No murmur, rub, or gallop. Abdomen:   Soft, non-tender, bowel sounds active. Musculoskeletal: No muscle wasting or digital clubbing. Extremities: Extremities normal, atraumatic. No cyanosis or edema. Pulses: 2+ radial and carotid pulses, symmetric. Skin: No rashes or lesions. Pysch: Normal mood and affect. Alert and oriented x 4.    Neurologic: Normal gross motor and sensory exam.       Labs     Lab Results   Component Value Date/Time    WBC 9.0 01/17/2023 09:59 AM    RBC 4.10 01/17/2023 09:59 AM    HGB 11.4 01/17/2023 09:59 AM    HCT 35.8 01/17/2023 09:59 AM    MCV 87.3 01/17/2023 09:59 AM    RDW 14.9 01/17/2023 09:59 AM     01/17/2023 09:59 AM     Lab Results   Component Value Date/Time     01/17/2023 09:59 AM    K 4.5 01/17/2023 09:59 AM    K 3.4 08/16/2022 05:37 AM     01/17/2023 09:59 AM    CO2 26 01/17/2023 09:59 AM    BUN 12 01/17/2023 09:59 AM    CREATININE 0.9 01/17/2023 09:59 AM    GFRAA >60 10/03/2022 12:30 PM    GFRAA >60 10/12/2012 11:00 AM    AGRATIO 1.2 01/17/2023 09:59 AM    LABGLOM >60 01/17/2023 09:59 AM    GLUCOSE 105 01/17/2023 09:59 AM    PROT 7.1 01/17/2023 09:59 AM    PROT 6.9 10/12/2012 11:00 AM    CALCIUM 9.7 01/17/2023 09:59 AM    BILITOT <0.2 01/17/2023 09:59 AM    ALKPHOS 100 01/17/2023 09:59 AM    AST 9 01/17/2023 09:59 AM    ALT <5 01/17/2023 09:59 AM       No results found for: PTINR  Lab Results   Component Value Date    LABA1C 5.9 01/17/2023     Lab Results   Component Value Date    TROPONINI 0.52 ([facility]) 07/18/2022       CURRENT Medications:  Current Outpatient Medications on File Prior to Visit   Medication Sig Dispense Refill    cefUROXime (CEFTIN) 250 MG tablet Take 1 tablet by mouth 2 times daily for 10 days 20 tablet 0    oxyCODONE-acetaminophen (PERCOCET) 7.5-325 MG per tablet Take 1 tablet by mouth 3 times daily for 28 days. 84 tablet 0    gabapentin (NEURONTIN) 600 MG tablet Take 1 tablet by mouth 2 times daily for 30 days. 60 tablet 1    pantoprazole (PROTONIX) 40 MG tablet Take 1 tablet by mouth daily 30 tablet 1    meloxicam (MOBIC) 15 MG tablet Take 1 tablet po on Mon, Wed, Fri 30 tablet 0    traZODone (DESYREL) 50 MG tablet Take 1-2 tablets by mouth nightly 60 tablet 1    atorvastatin (LIPITOR) 80 MG tablet TAKE 1 TABLET BY MOUTH EVERY DAY FOR CHOLESTEROL 90 tablet 1    DULoxetine (CYMBALTA) 60 MG extended release capsule TAKE 1 CAPSULE BY MOUTH TWICE A  capsule 0    ipratropium-albuterol (DUONEB) 0.5-2.5 (3) MG/3ML SOLN nebulizer solution Take 1 aerosol every 4-6 hours as needed for shortness of breath coughing and wheezing 360 mL 0    Fluticasone-Umeclidin-Vilant (TRELEGY ELLIPTA) 200-62.5-25 MCG/INH AEPB Inhale 1 puff into the lungs daily 1 each 11    alendronate (FOSAMAX) 70 MG tablet TAKE 1 TABLET BY MOUTH ONE TIME PER WEEK 12 tablet 1    aspirin 81 MG EC tablet Take 1 tablet by mouth in the morning.  30 tablet 3    albuterol sulfate  (90 Base) MCG/ACT inhaler Inhale 2 puffs into the lungs every 6 hours as needed for Shortness of Breath 1 each 11    Calcium Citrate-Vitamin D 500-500 MG-UNIT CHEW Take 1 tablet by mouth 2 times daily      ONE TOUCH LANCETS MISC 1 each by Does not apply route daily 100 each 3    Cholecalciferol (VITAMIN D) 2000 units TABS tablet Take 1 tablet by mouth daily 30 tablet     cetirizine (ZYRTEC) 10 MG tablet Take 10 mg by mouth daily as needed for Allergies      Nebulizers (COMPRESSOR/NEBULIZER) MISC 1 Units by Does not apply route 4 times daily 1 each 3     Current Facility-Administered Medications on File Prior to Visit Medication Dose Route Frequency Provider Last Rate Last Admin    triamcinolone acetonide (KENALOG-40) injection 40 mg  40 mg IntraMUSCular Once Hailey Patton MD           Assessment and Plan   1) CAD   7/19/22 KIRILL to LCX   Continue ASA  Completed 6 months of DAPT, Effient discontinued   Denies any recurrent epistaxis since 12/2022    2) Hyperlipidemia   Remain on statin therapy   LDL goal <70 mg/dL     3) COPD, severe   Managed per pulmonary       Follow up in 1 year. Thank you for allowing us to participate in the care of [facility]. Claire Pino MD [facility]   (Y): [phone]   (F): [phone]    This note was scribed in the presence of Dr Janie Echols MD by [place], LIZETH. Physician Attestation:  The scribes documentation has been prepared under my direction and personally reviewed by me in its entirety. I confirm the note above accurately reflects all work, treatment, procedures, and medical decision making performed by me.     Electronically signed by Nancy Almanza MD on 2/28/2023 at 11:05 PM

## 2023-03-10 ENCOUNTER — OFFICE VISIT (OUTPATIENT)
Dept: FAMILY MEDICINE CLINIC | Age: 78
End: 2023-03-10
Payer: MEDICARE

## 2023-03-10 VITALS
HEART RATE: 84 BPM | SYSTOLIC BLOOD PRESSURE: 116 MMHG | WEIGHT: 139.4 LBS | TEMPERATURE: 98 F | OXYGEN SATURATION: 96 % | HEIGHT: 64 IN | DIASTOLIC BLOOD PRESSURE: 68 MMHG | BODY MASS INDEX: 23.8 KG/M2 | RESPIRATION RATE: 16 BRPM

## 2023-03-10 DIAGNOSIS — R30.0 DYSURIA: Primary | ICD-10-CM

## 2023-03-10 LAB
BILIRUBIN, POC: NORMAL
BLOOD URINE, POC: NORMAL
CLARITY, POC: CLEAR
COLOR, POC: YELLOW
GLUCOSE URINE, POC: NORMAL
KETONES, POC: NORMAL
LEUKOCYTE EST, POC: NORMAL
NITRITE, POC: NORMAL
PH, POC: 6.5
PROTEIN, POC: NORMAL
SPECIFIC GRAVITY, POC: 1.02
UROBILINOGEN, POC: 0.2

## 2023-03-10 PROCEDURE — 81002 URINALYSIS NONAUTO W/O SCOPE: CPT | Performed by: NURSE PRACTITIONER

## 2023-03-10 PROCEDURE — 1123F ACP DISCUSS/DSCN MKR DOCD: CPT | Performed by: NURSE PRACTITIONER

## 2023-03-10 PROCEDURE — 3017F COLORECTAL CA SCREEN DOC REV: CPT | Performed by: NURSE PRACTITIONER

## 2023-03-10 PROCEDURE — 99212 OFFICE O/P EST SF 10 MIN: CPT | Performed by: NURSE PRACTITIONER

## 2023-03-10 PROCEDURE — G8399 PT W/DXA RESULTS DOCUMENT: HCPCS | Performed by: NURSE PRACTITIONER

## 2023-03-10 PROCEDURE — 1036F TOBACCO NON-USER: CPT | Performed by: NURSE PRACTITIONER

## 2023-03-10 PROCEDURE — G8420 CALC BMI NORM PARAMETERS: HCPCS | Performed by: NURSE PRACTITIONER

## 2023-03-10 PROCEDURE — G8427 DOCREV CUR MEDS BY ELIG CLIN: HCPCS | Performed by: NURSE PRACTITIONER

## 2023-03-10 PROCEDURE — G8484 FLU IMMUNIZE NO ADMIN: HCPCS | Performed by: NURSE PRACTITIONER

## 2023-03-10 PROCEDURE — 1090F PRES/ABSN URINE INCON ASSESS: CPT | Performed by: NURSE PRACTITIONER

## 2023-03-10 ASSESSMENT — ENCOUNTER SYMPTOMS: ABDOMINAL PAIN: 0

## 2023-03-10 NOTE — PROGRESS NOTES
3/10/2023    This is a 78 y.o. female   Chief Complaint   Patient presents with    Dysuria     F/u. Symptoms have resolved. [facility]  Patient is here to f/u from her dysuria and frequency from 2/24/23. She had taken AZO so urine dipstick was not performed. Started on ceftin 250 mg BID for symptoms and suspected UTI. Urine culture showed mixed skin/urogenital fabiana and antibiotic was stopped on 2/27/23. She reports that her symptoms stopped after taking the antibiotic. Denies recurrent dysuria, frequency. Denies hematuria, fever, pelvic pain. She is not interested in seeing urologist for recurrent symptoms.       Patient Active Problem List   Diagnosis    Osteopenia-last dexa 2009 repeat 2015 (-2.4 hip)--advised tx    Colon polyp, hyperplastic,-(done 3/11-repeat 3-5 yrs--dr Edis Phan)    Family history of colon cancer    CTS (carpal tunnel syndrome)BILATERAL-s/p surgical repair--resolved     Postmenopausal status-last mammogram 2/15 wnl last pap 12/13--wnl    Nondependent alcohol abuse, in remission    Urticaria, chronic    Smoking    Chronic back pain-(djd) saw dr Maciel Patel afford surgery--seeing dr Diana Dawson for pain meds    Fibromyalgia    Hypercholesteremia    Lumbar spondylosis    Vitamin D deficiency--severe--started 50,000 iu 2x/ wk 11/13    Insomnia--on elevil w help    Constipation--on daily miralax    Depression    Chronic pain syndrome    Muscle cramping    Acute on chronic respiratory failure with hypercapnia ([address]    ROLY (acute kidney injury) ([address]    Severe sepsis (HCC)    Gastroesophageal reflux disease    COPD, severe ([address]    Chronic hypoxemic respiratory failure ([address]    Degeneration of lumbar or lumbosacral intervertebral disc    Trochanteric bursitis of right hip    Diabetes ([address]    Controlled type 2 diabetes mellitus without complication, without long-term current use of insulin ([address]    Age-related osteoporosis without current pathological fracture- started alendronate 9/2021 Pulmonary nodule seen on imaging study    General weakness    Elevated lactic acid level    Cavitary pneumonia    Acute respiratory failure with hypoxia (HCC)    Acute on chronic respiratory failure with hypoxia and hypercapnia (HCC)    Abnormal EKG    NSTEMI (non-ST elevated myocardial infarction) (HCC)    Abscess of upper lobe of right lung with pneumonia (HCC)    Pseudomonas respiratory infection    Abnormal CT of the chest    Peripherally inserted central catheter (PICC) in place    Massive hemoptysis    Epistaxis    Acute respiratory insufficiency    Acute blood loss anemia    Hypotension due to drugs    Hypoxia    Mixed restrictive and obstructive lung disease ([facility]       Current Outpatient Medications   Medication Sig Dispense Refill    oxyCODONE-acetaminophen (PERCOCET) 7.5-325 MG per tablet Take 1 tablet by mouth 3 times daily for 28 days. 84 tablet 0    gabapentin (NEURONTIN) 600 MG tablet Take 1 tablet by mouth 2 times daily for 30 days. 60 tablet 1    pantoprazole (PROTONIX) 40 MG tablet Take 1 tablet by mouth daily 30 tablet 1    meloxicam (MOBIC) 15 MG tablet Take 1 tablet po on Mon, Wed, Fri 30 tablet 0    traZODone (DESYREL) 50 MG tablet Take 1-2 tablets by mouth nightly 60 tablet 1    atorvastatin (LIPITOR) 80 MG tablet TAKE 1 TABLET BY MOUTH EVERY DAY FOR CHOLESTEROL 90 tablet 1    DULoxetine (CYMBALTA) 60 MG extended release capsule TAKE 1 CAPSULE BY MOUTH TWICE A  capsule 0    ipratropium-albuterol (DUONEB) 0.5-2.5 (3) MG/3ML SOLN nebulizer solution Take 1 aerosol every 4-6 hours as needed for shortness of breath coughing and wheezing 360 mL 0    Fluticasone-Umeclidin-Vilant (TRELEGY ELLIPTA) 200-62.5-25 MCG/INH AEPB Inhale 1 puff into the lungs daily 1 each 11    alendronate (FOSAMAX) 70 MG tablet TAKE 1 TABLET BY MOUTH ONE TIME PER WEEK 12 tablet 1    aspirin 81 MG EC tablet Take 1 tablet by mouth in the morning.  30 tablet 3    albuterol sulfate  (90 Base) MCG/ACT inhaler Inhale 2 puffs into the lungs every 6 hours as needed for Shortness of Breath 1 each 11    Calcium Citrate-Vitamin D 500-500 MG-UNIT CHEW Take 1 tablet by mouth 2 times daily      ONE TOUCH LANCETS MISC 1 each by Does not apply route daily 100 each 3    Cholecalciferol (VITAMIN D) 2000 units TABS tablet Take 1 tablet by mouth daily 30 tablet     cetirizine (ZYRTEC) 10 MG tablet Take 10 mg by mouth daily as needed for Allergies      Nebulizers (COMPRESSOR/NEBULIZER) MISC 1 Units by Does not apply route 4 times daily 1 each 3     Current Facility-Administered Medications   Medication Dose Route Frequency Provider Last Rate Last Admin    triamcinolone acetonide (KENALOG-40) injection 40 mg  40 mg IntraMUSCular Once Ozmoshe Rivas MD           No Known Allergies    Review of Systems   Constitutional:  Negative for activity change and fever. Gastrointestinal:  Negative for abdominal pain. Genitourinary:  Negative for difficulty urinating, dysuria, flank pain, frequency, hematuria, pelvic pain and vaginal discharge. Vitals:    03/10/23 0842   BP: 116/68   Site: Right Upper Arm   Position: Sitting   Cuff Size: Medium Adult   Pulse: 84   Resp: 16   Temp: 98 °F (36.7 °C)   TempSrc: Oral   SpO2: 96%   Weight: 139 lb 6.4 oz (63.2 kg)   Height: 5' 4\" (1.626 m)       Body mass index is 23.93 kg/m². Wt Readings from Last 3 Encounters:   03/10/23 139 lb 6.4 oz (63.2 kg)   02/27/23 138 lb (62.6 kg)   02/24/23 136 lb 3.2 oz (61.8 kg)       BP Readings from Last 3 Encounters:   03/10/23 116/68   02/27/23 120/62   02/24/23 (!) 110/58       Physical Exam  Vitals and nursing note reviewed. Constitutional:       General: She is not in acute distress. Appearance: Normal appearance. She is well-developed. HENT:      Head: Normocephalic and atraumatic. Cardiovascular:      Rate and Rhythm: Normal rate and regular rhythm. Heart sounds: Normal heart sounds. No murmur heard. No friction rub. No gallop.    Pulmonary: Effort: Pulmonary effort is normal.      Breath sounds: Normal breath sounds. Abdominal:      Palpations: Abdomen is soft. Tenderness: There is no abdominal tenderness. There is no right CVA tenderness, left CVA tenderness, guarding or rebound. Musculoskeletal:      Cervical back: Neck supple. Skin:     General: Skin is warm and dry. Neurological:      Mental Status: She is alert and oriented to person, place, and time. Psychiatric:         Behavior: Behavior normal.         Thought Content: Thought content normal.         Judgment: Judgment normal.       Assessmentand Plan  Nguyễn Thorndale was seen today for dysuria. Diagnoses and all orders for this visit:    Dysuria  -     POCT Urinalysis no Micro- small amount of leukocytes. No blood or nitrites. Reports that symptoms have resolved. Advised to stay hydrated with water. She is not interested in seeing urology for evaluation. She will call if she has recurrent symptoms. Return in about 1 month (around 4/13/2023), or if symptoms worsen or fail to improve, for chronic conditions.

## 2023-03-14 ENCOUNTER — OFFICE VISIT (OUTPATIENT)
Dept: PAIN MANAGEMENT | Age: 78
End: 2023-03-14
Payer: MEDICARE

## 2023-03-14 VITALS
HEART RATE: 92 BPM | OXYGEN SATURATION: 95 % | WEIGHT: 137.8 LBS | SYSTOLIC BLOOD PRESSURE: 124 MMHG | BODY MASS INDEX: 23.65 KG/M2 | DIASTOLIC BLOOD PRESSURE: 74 MMHG

## 2023-03-14 DIAGNOSIS — M47.26 OSTEOARTHRITIS OF SPINE WITH RADICULOPATHY, LUMBAR REGION: ICD-10-CM

## 2023-03-14 DIAGNOSIS — M70.61 TROCHANTERIC BURSITIS OF RIGHT HIP: ICD-10-CM

## 2023-03-14 DIAGNOSIS — M51.37 DEGENERATION OF LUMBAR OR LUMBOSACRAL INTERVERTEBRAL DISC: ICD-10-CM

## 2023-03-14 DIAGNOSIS — G89.4 CHRONIC PAIN SYNDROME: ICD-10-CM

## 2023-03-14 DIAGNOSIS — M47.816 LUMBAR SPONDYLOSIS: ICD-10-CM

## 2023-03-14 DIAGNOSIS — M79.7 FIBROMYALGIA: ICD-10-CM

## 2023-03-14 DIAGNOSIS — M51.36 DDD (DEGENERATIVE DISC DISEASE), LUMBAR: ICD-10-CM

## 2023-03-14 PROCEDURE — 99213 OFFICE O/P EST LOW 20 MIN: CPT | Performed by: INTERNAL MEDICINE

## 2023-03-14 PROCEDURE — G8420 CALC BMI NORM PARAMETERS: HCPCS | Performed by: INTERNAL MEDICINE

## 2023-03-14 PROCEDURE — 3017F COLORECTAL CA SCREEN DOC REV: CPT | Performed by: INTERNAL MEDICINE

## 2023-03-14 PROCEDURE — G8399 PT W/DXA RESULTS DOCUMENT: HCPCS | Performed by: INTERNAL MEDICINE

## 2023-03-14 PROCEDURE — 1123F ACP DISCUSS/DSCN MKR DOCD: CPT | Performed by: INTERNAL MEDICINE

## 2023-03-14 PROCEDURE — 1090F PRES/ABSN URINE INCON ASSESS: CPT | Performed by: INTERNAL MEDICINE

## 2023-03-14 PROCEDURE — 1036F TOBACCO NON-USER: CPT | Performed by: INTERNAL MEDICINE

## 2023-03-14 PROCEDURE — G8484 FLU IMMUNIZE NO ADMIN: HCPCS | Performed by: INTERNAL MEDICINE

## 2023-03-14 PROCEDURE — G8427 DOCREV CUR MEDS BY ELIG CLIN: HCPCS | Performed by: INTERNAL MEDICINE

## 2023-03-14 RX ORDER — PANTOPRAZOLE SODIUM 40 MG/1
40 TABLET, DELAYED RELEASE ORAL DAILY
Qty: 30 TABLET | Refills: 1 | Status: SHIPPED | OUTPATIENT
Start: 2023-03-14

## 2023-03-14 RX ORDER — OXYCODONE AND ACETAMINOPHEN 7.5; 325 MG/1; MG/1
1 TABLET ORAL 3 TIMES DAILY
Qty: 84 TABLET | Refills: 0 | Status: SHIPPED | OUTPATIENT
Start: 2023-03-14 | End: 2023-04-11

## 2023-03-14 RX ORDER — TRAZODONE HYDROCHLORIDE 50 MG/1
50-100 TABLET ORAL NIGHTLY
Qty: 60 TABLET | Refills: 1 | Status: SHIPPED | OUTPATIENT
Start: 2023-03-14

## 2023-03-14 RX ORDER — MELOXICAM 15 MG/1
TABLET ORAL
Qty: 30 TABLET | Refills: 0 | Status: SHIPPED | OUTPATIENT
Start: 2023-03-14

## 2023-03-14 RX ORDER — GABAPENTIN 600 MG/1
600 TABLET ORAL 2 TIMES DAILY
Qty: 60 TABLET | Refills: 1 | Status: SHIPPED | OUTPATIENT
Start: 2023-03-14 | End: 2023-04-13

## 2023-03-15 DIAGNOSIS — G89.4 CHRONIC PAIN SYNDROME: ICD-10-CM

## 2023-03-15 DIAGNOSIS — F33.1 MODERATE EPISODE OF RECURRENT MAJOR DEPRESSIVE DISORDER (HCC): ICD-10-CM

## 2023-03-15 DIAGNOSIS — M81.0 AGE-RELATED OSTEOPOROSIS WITHOUT CURRENT PATHOLOGICAL FRACTURE: ICD-10-CM

## 2023-03-15 NOTE — PROGRESS NOTES
release capsule TAKE 1 CAPSULE BY MOUTH TWICE A  capsule 0    ipratropium-albuterol (DUONEB) 0.5-2.5 (3) MG/3ML SOLN nebulizer solution Take 1 aerosol every 4-6 hours as needed for shortness of breath coughing and wheezing 360 mL 0    Fluticasone-Umeclidin-Vilant (TRELEGY ELLIPTA) 200-62.5-25 MCG/INH AEPB Inhale 1 puff into the lungs daily 1 each 11    alendronate (FOSAMAX) 70 MG tablet TAKE 1 TABLET BY MOUTH ONE TIME PER WEEK 12 tablet 1    aspirin 81 MG EC tablet Take 1 tablet by mouth in the morning. 30 tablet 3    albuterol sulfate  (90 Base) MCG/ACT inhaler Inhale 2 puffs into the lungs every 6 hours as needed for Shortness of Breath 1 each 11    Calcium Citrate-Vitamin D 500-500 MG-UNIT CHEW Take 1 tablet by mouth 2 times daily      ONE TOUCH LANCETS MISC 1 each by Does not apply route daily 100 each 3    Cholecalciferol (VITAMIN D) 2000 units TABS tablet Take 1 tablet by mouth daily 30 tablet     cetirizine (ZYRTEC) 10 MG tablet Take 10 mg by mouth daily as needed for Allergies      Nebulizers (COMPRESSOR/NEBULIZER) MISC 1 Units by Does not apply route 4 times daily 1 each 3     Current Facility-Administered Medications   Medication Dose Route Frequency Provider Last Rate Last Admin    triamcinolone acetonide (KENALOG-40) injection 40 mg  40 mg IntraMUSCular Once Starlene Stairs, MD         I will continue her current medication regimen  which is part of the above treatment schedule. It has been helping with Ms. Fuad Kohler chronic  medical problems which for this visit include:   Diagnoses of Osteoarthritis of spine with radiculopathy, lumbar region, Lumbar spondylosis, Chronic pain syndrome, Degeneration of lumbar or lumbosacral intervertebral disc, Fibromyalgia, DDD (degenerative disc disease), lumbar, and Trochanteric bursitis of right hip were pertinent to this visit.    Risks and benefits of the medications and other alternative treatments  including no treatment were discussed with the

## 2023-03-16 RX ORDER — ALENDRONATE SODIUM 70 MG/1
TABLET ORAL
Qty: 12 TABLET | Refills: 1 | Status: SHIPPED | OUTPATIENT
Start: 2023-03-16

## 2023-03-16 RX ORDER — DULOXETIN HYDROCHLORIDE 60 MG/1
CAPSULE, DELAYED RELEASE ORAL
Qty: 180 CAPSULE | Refills: 1 | Status: SHIPPED | OUTPATIENT
Start: 2023-03-16

## 2023-04-10 PROBLEM — N17.9 AKI (ACUTE KIDNEY INJURY) (HCC): Status: RESOLVED | Noted: 2017-01-10 | Resolved: 2023-04-10

## 2023-04-17 DIAGNOSIS — N30.01 ACUTE CYSTITIS WITH HEMATURIA: Primary | ICD-10-CM

## 2023-04-21 DIAGNOSIS — N30.01 ACUTE CYSTITIS WITH HEMATURIA: ICD-10-CM

## 2023-04-21 LAB
BACTERIA URNS QL MICRO: ABNORMAL /HPF
BILIRUB UR QL STRIP.AUTO: NEGATIVE
CLARITY UR: CLEAR
COLOR UR: YELLOW
EPI CELLS #/AREA URNS AUTO: 1 /HPF (ref 0–5)
GLUCOSE UR STRIP.AUTO-MCNC: NEGATIVE MG/DL
HGB UR QL STRIP.AUTO: NEGATIVE
HYALINE CASTS #/AREA URNS AUTO: 0 /LPF (ref 0–8)
KETONES UR STRIP.AUTO-MCNC: NEGATIVE MG/DL
LEUKOCYTE ESTERASE UR QL STRIP.AUTO: ABNORMAL
NITRITE UR QL STRIP.AUTO: NEGATIVE
PH UR STRIP.AUTO: 7 [PH] (ref 5–8)
PROT UR STRIP.AUTO-MCNC: NEGATIVE MG/DL
RBC CLUMPS #/AREA URNS AUTO: 0 /HPF (ref 0–4)
SP GR UR STRIP.AUTO: 1 (ref 1–1.03)
UA DIPSTICK W REFLEX MICRO PNL UR: YES
URN SPEC COLLECT METH UR: ABNORMAL
UROBILINOGEN UR STRIP-ACNC: 1 E.U./DL
WBC #/AREA URNS AUTO: 8 /HPF (ref 0–5)

## 2023-04-23 LAB
BACTERIA UR CULT: ABNORMAL
ORGANISM: ABNORMAL

## 2023-04-24 DIAGNOSIS — A49.8 INFECTION DUE TO ESBL-PRODUCING KLEBSIELLA PNEUMONIAE: ICD-10-CM

## 2023-04-24 DIAGNOSIS — M81.0 AGE-RELATED OSTEOPOROSIS WITHOUT CURRENT PATHOLOGICAL FRACTURE: ICD-10-CM

## 2023-04-24 DIAGNOSIS — A49.8 INFECTION DUE TO ESBL-PRODUCING KLEBSIELLA PNEUMONIAE: Primary | ICD-10-CM

## 2023-04-24 DIAGNOSIS — Z16.12 INFECTION DUE TO ESBL-PRODUCING KLEBSIELLA PNEUMONIAE: Primary | ICD-10-CM

## 2023-04-24 DIAGNOSIS — Z16.12 INFECTION DUE TO ESBL-PRODUCING KLEBSIELLA PNEUMONIAE: ICD-10-CM

## 2023-04-24 LAB
25(OH)D3 SERPL-MCNC: 29.1 NG/ML
ALBUMIN SERPL-MCNC: 4.2 G/DL (ref 3.4–5)
ALBUMIN/GLOB SERPL: 1.5 {RATIO} (ref 1.1–2.2)
ALP SERPL-CCNC: 75 U/L (ref 40–129)
ALT SERPL-CCNC: 8 U/L (ref 10–40)
ANION GAP SERPL CALCULATED.3IONS-SCNC: 13 MMOL/L (ref 3–16)
AST SERPL-CCNC: 12 U/L (ref 15–37)
BASOPHILS # BLD: 0.1 K/UL (ref 0–0.2)
BASOPHILS NFR BLD: 0.9 %
BILIRUB SERPL-MCNC: 0.3 MG/DL (ref 0–1)
BUN SERPL-MCNC: 13 MG/DL (ref 7–20)
CALCIUM SERPL-MCNC: 9.2 MG/DL (ref 8.3–10.6)
CHLORIDE SERPL-SCNC: 106 MMOL/L (ref 99–110)
CO2 SERPL-SCNC: 25 MMOL/L (ref 21–32)
CREAT SERPL-MCNC: 1.1 MG/DL (ref 0.6–1.2)
DEPRECATED RDW RBC AUTO: 19.8 % (ref 12.4–15.4)
EOSINOPHIL # BLD: 0.5 K/UL (ref 0–0.6)
EOSINOPHIL NFR BLD: 7 %
GFR SERPLBLD CREATININE-BSD FMLA CKD-EPI: 52 ML/MIN/{1.73_M2}
GLUCOSE SERPL-MCNC: 101 MG/DL (ref 70–99)
HCT VFR BLD AUTO: 38.3 % (ref 36–48)
HGB BLD-MCNC: 12.4 G/DL (ref 12–16)
LYMPHOCYTES # BLD: 1.7 K/UL (ref 1–5.1)
LYMPHOCYTES NFR BLD: 26.2 %
MCH RBC QN AUTO: 27.8 PG (ref 26–34)
MCHC RBC AUTO-ENTMCNC: 32.3 G/DL (ref 31–36)
MCV RBC AUTO: 86.1 FL (ref 80–100)
MONOCYTES # BLD: 0.4 K/UL (ref 0–1.3)
MONOCYTES NFR BLD: 6.3 %
NEUTROPHILS # BLD: 3.9 K/UL (ref 1.7–7.7)
NEUTROPHILS NFR BLD: 59.6 %
PLATELET # BLD AUTO: 351 K/UL (ref 135–450)
PMV BLD AUTO: 7.7 FL (ref 5–10.5)
POTASSIUM SERPL-SCNC: 3.9 MMOL/L (ref 3.5–5.1)
PROT SERPL-MCNC: 7 G/DL (ref 6.4–8.2)
RBC # BLD AUTO: 4.45 M/UL (ref 4–5.2)
SODIUM SERPL-SCNC: 144 MMOL/L (ref 136–145)
WBC # BLD AUTO: 6.5 K/UL (ref 4–11)

## 2023-04-26 ENCOUNTER — TELEMEDICINE (OUTPATIENT)
Dept: INFECTIOUS DISEASES | Age: 78
End: 2023-04-26

## 2023-04-26 DIAGNOSIS — Z22.39 CARRIER OF EXTENDED SPECTRUM BETA LACTAMASE (ESBL) PRODUCING BACTERIA: Primary | ICD-10-CM

## 2023-04-26 DIAGNOSIS — Z87.01 H/O PNEUMONIA DUE TO PSEUDOMONAS: ICD-10-CM

## 2023-04-26 DIAGNOSIS — R82.71 ASYMPTOMATIC BACTERIURIA: ICD-10-CM

## 2023-04-26 RX ORDER — LEVOFLOXACIN 750 MG/1
750 TABLET ORAL DAILY
Qty: 10 TABLET | Refills: 0 | Status: SHIPPED | OUTPATIENT
Start: 2023-04-26 | End: 2023-05-06

## 2023-04-28 NOTE — PROGRESS NOTES
1230: 40mg etomidate, 4mg versed, 50mg rocuronium  1231: levo started at 10mcg, intubation with good color change, Clear bilat breath sounds   1232: 55 CC of blood from ETT disposed  1233: propofol started at 35 mcg, bronch started  1234: propofol up to 50mcg  1236: fentanyl started at 100mcg, levo turned off. Bronch completed and no blood in lungs. ENT consulted for nose bleed. Patient is here today for well adolescent exam.     Bilateral forearm pain \"feels like restless legs in arms\" Onset: one month ago   Happens more often when anxiety is present otherwise sporadic.     Health Maintenance Due   Topic Date Due   • COVID-19 Vaccine (1) Never done   • HPV Vaccine (2 - 2-dose series) 06/10/2022   • Annual Physical (ages 3-18)  12/10/2022   • Depression Screening  08/19/2023       Patient is due for the topics as listed above and wishes to proceed with the hpv vaccine. Completing depression screening on form.

## 2023-05-09 ENCOUNTER — OFFICE VISIT (OUTPATIENT)
Dept: PAIN MANAGEMENT | Age: 78
End: 2023-05-09
Payer: MEDICARE

## 2023-05-09 VITALS
HEART RATE: 82 BPM | DIASTOLIC BLOOD PRESSURE: 60 MMHG | WEIGHT: 143 LBS | BODY MASS INDEX: 24.55 KG/M2 | OXYGEN SATURATION: 100 % | SYSTOLIC BLOOD PRESSURE: 93 MMHG

## 2023-05-09 DIAGNOSIS — M47.26 OSTEOARTHRITIS OF SPINE WITH RADICULOPATHY, LUMBAR REGION: ICD-10-CM

## 2023-05-09 DIAGNOSIS — M51.37 DEGENERATION OF LUMBAR OR LUMBOSACRAL INTERVERTEBRAL DISC: ICD-10-CM

## 2023-05-09 DIAGNOSIS — G89.4 CHRONIC PAIN SYNDROME: ICD-10-CM

## 2023-05-09 DIAGNOSIS — M47.816 LUMBAR SPONDYLOSIS: ICD-10-CM

## 2023-05-09 DIAGNOSIS — M70.61 TROCHANTERIC BURSITIS OF RIGHT HIP: ICD-10-CM

## 2023-05-09 DIAGNOSIS — M79.7 FIBROMYALGIA: ICD-10-CM

## 2023-05-09 DIAGNOSIS — M51.36 DDD (DEGENERATIVE DISC DISEASE), LUMBAR: ICD-10-CM

## 2023-05-09 PROCEDURE — G8399 PT W/DXA RESULTS DOCUMENT: HCPCS | Performed by: INTERNAL MEDICINE

## 2023-05-09 PROCEDURE — G8420 CALC BMI NORM PARAMETERS: HCPCS | Performed by: INTERNAL MEDICINE

## 2023-05-09 PROCEDURE — 1090F PRES/ABSN URINE INCON ASSESS: CPT | Performed by: INTERNAL MEDICINE

## 2023-05-09 PROCEDURE — G8427 DOCREV CUR MEDS BY ELIG CLIN: HCPCS | Performed by: INTERNAL MEDICINE

## 2023-05-09 PROCEDURE — 99213 OFFICE O/P EST LOW 20 MIN: CPT | Performed by: INTERNAL MEDICINE

## 2023-05-09 PROCEDURE — 3017F COLORECTAL CA SCREEN DOC REV: CPT | Performed by: INTERNAL MEDICINE

## 2023-05-09 PROCEDURE — 1123F ACP DISCUSS/DSCN MKR DOCD: CPT | Performed by: INTERNAL MEDICINE

## 2023-05-09 PROCEDURE — 1036F TOBACCO NON-USER: CPT | Performed by: INTERNAL MEDICINE

## 2023-05-09 RX ORDER — MELOXICAM 15 MG/1
TABLET ORAL
Qty: 30 TABLET | Refills: 0 | Status: SHIPPED | OUTPATIENT
Start: 2023-05-09

## 2023-05-09 RX ORDER — PANTOPRAZOLE SODIUM 40 MG/1
40 TABLET, DELAYED RELEASE ORAL DAILY
Qty: 30 TABLET | Refills: 1 | Status: SHIPPED | OUTPATIENT
Start: 2023-05-09

## 2023-05-09 RX ORDER — GABAPENTIN 600 MG/1
600 TABLET ORAL 2 TIMES DAILY
Qty: 60 TABLET | Refills: 1 | Status: SHIPPED | OUTPATIENT
Start: 2023-05-09 | End: 2023-06-08

## 2023-05-09 RX ORDER — TRAZODONE HYDROCHLORIDE 50 MG/1
50-100 TABLET ORAL NIGHTLY
Qty: 60 TABLET | Refills: 1 | Status: SHIPPED | OUTPATIENT
Start: 2023-05-09

## 2023-05-09 RX ORDER — OXYCODONE AND ACETAMINOPHEN 7.5; 325 MG/1; MG/1
1 TABLET ORAL EVERY 6 HOURS PRN
Qty: 100 TABLET | Refills: 0 | Status: SHIPPED | OUTPATIENT
Start: 2023-05-09 | End: 2023-06-06

## 2023-05-09 NOTE — PROGRESS NOTES
Sofiya Marino  1945  [id number]      HISTORY OF PRESENT ILLNESS:  Ms. Tre Wills is a 78 y.o. female returns for a follow up visit for pain management  She has a diagnosis of   1. Osteoarthritis of spine with radiculopathy, lumbar region    2. Degeneration of lumbar or lumbosacral intervertebral disc    3. Trochanteric bursitis of right hip    4. Chronic pain syndrome    5. Fibromyalgia    6. Lumbar spondylosis    7. DDD (degenerative disc disease), lumbar    8. Primary insomnia    9. Muscle cramping    10. Moderate episode of recurrent major depressive disorder ([address]    11. Gastroesophageal reflux disease without esophagitis    . She complains of pain in the  low back, left hip   She rates the pain 8/10 and describes it as aching. Current treatment regimen has helped relieve about 30% of the pain. She denies any side effects from the current pain regimen. Patient reports that since the last follow up visit the physical functioning is unchanged, family/social relationships are unchanged, mood is unchanged sleep patterns are unchanged, and that the overall functioning is unchanged. Patient denies misusing/abusing her narcotic pain medications or using any illegal drugs. There are No indicators for possible drug abuse, addiction or diversion problems. Patient states she has been doing about the same, she states she has been compliant with her regimen. Patient states the pain is greater in the back than the legs. Ms. Tre Wills reports her breathing has been okay. She mentions she is using Percocet 3-4 per day. Patient reports she is managing light house chores. ALLERGIES: Patients list of allergies were reviewed     MEDICATIONS: Ms. Tre Wills list of medications were reviewed. Her current medications are   Outpatient Medications Prior to Visit   Medication Sig Dispense Refill    oxyCODONE-acetaminophen (PERCOCET) 7.5-325 MG per tablet Take 1 tablet by mouth every 6 hours as needed for Pain (max 3-4) for up

## 2023-07-11 ENCOUNTER — OFFICE VISIT (OUTPATIENT)
Dept: PAIN MANAGEMENT | Age: 78
End: 2023-07-11
Payer: MEDICARE

## 2023-07-11 VITALS
DIASTOLIC BLOOD PRESSURE: 81 MMHG | SYSTOLIC BLOOD PRESSURE: 139 MMHG | WEIGHT: 147 LBS | BODY MASS INDEX: 25.23 KG/M2 | HEART RATE: 76 BPM

## 2023-07-11 DIAGNOSIS — M70.61 TROCHANTERIC BURSITIS OF RIGHT HIP: ICD-10-CM

## 2023-07-11 DIAGNOSIS — M79.7 FIBROMYALGIA: ICD-10-CM

## 2023-07-11 DIAGNOSIS — M51.37 DEGENERATION OF LUMBAR OR LUMBOSACRAL INTERVERTEBRAL DISC: ICD-10-CM

## 2023-07-11 DIAGNOSIS — M47.26 OSTEOARTHRITIS OF SPINE WITH RADICULOPATHY, LUMBAR REGION: ICD-10-CM

## 2023-07-11 DIAGNOSIS — M51.36 DDD (DEGENERATIVE DISC DISEASE), LUMBAR: ICD-10-CM

## 2023-07-11 DIAGNOSIS — M47.816 LUMBAR SPONDYLOSIS: ICD-10-CM

## 2023-07-11 DIAGNOSIS — G89.4 CHRONIC PAIN SYNDROME: ICD-10-CM

## 2023-07-11 PROCEDURE — G8427 DOCREV CUR MEDS BY ELIG CLIN: HCPCS | Performed by: INTERNAL MEDICINE

## 2023-07-11 PROCEDURE — 1036F TOBACCO NON-USER: CPT | Performed by: INTERNAL MEDICINE

## 2023-07-11 PROCEDURE — 1090F PRES/ABSN URINE INCON ASSESS: CPT | Performed by: INTERNAL MEDICINE

## 2023-07-11 PROCEDURE — G8399 PT W/DXA RESULTS DOCUMENT: HCPCS | Performed by: INTERNAL MEDICINE

## 2023-07-11 PROCEDURE — G8419 CALC BMI OUT NRM PARAM NOF/U: HCPCS | Performed by: INTERNAL MEDICINE

## 2023-07-11 PROCEDURE — 1123F ACP DISCUSS/DSCN MKR DOCD: CPT | Performed by: INTERNAL MEDICINE

## 2023-07-11 PROCEDURE — 99213 OFFICE O/P EST LOW 20 MIN: CPT | Performed by: INTERNAL MEDICINE

## 2023-07-11 PROCEDURE — 3017F COLORECTAL CA SCREEN DOC REV: CPT | Performed by: INTERNAL MEDICINE

## 2023-07-11 RX ORDER — TRAZODONE HYDROCHLORIDE 50 MG/1
50-100 TABLET ORAL NIGHTLY
Qty: 60 TABLET | Refills: 1 | Status: SHIPPED | OUTPATIENT
Start: 2023-07-11

## 2023-07-11 RX ORDER — PANTOPRAZOLE SODIUM 40 MG/1
40 TABLET, DELAYED RELEASE ORAL DAILY
Qty: 30 TABLET | Refills: 1 | Status: SHIPPED | OUTPATIENT
Start: 2023-07-11

## 2023-07-11 RX ORDER — GABAPENTIN 600 MG/1
600 TABLET ORAL 2 TIMES DAILY
Qty: 60 TABLET | Refills: 1 | Status: SHIPPED | OUTPATIENT
Start: 2023-07-11 | End: 2023-09-09

## 2023-07-11 RX ORDER — OXYCODONE AND ACETAMINOPHEN 7.5; 325 MG/1; MG/1
1 TABLET ORAL EVERY 6 HOURS PRN
Qty: 120 TABLET | Refills: 0 | Status: SHIPPED | OUTPATIENT
Start: 2023-07-11 | End: 2023-08-15

## 2023-07-11 RX ORDER — MELOXICAM 15 MG/1
TABLET ORAL
Qty: 30 TABLET | Refills: 0 | Status: SHIPPED | OUTPATIENT
Start: 2023-07-11

## 2023-07-11 NOTE — PROGRESS NOTES
Anuja Oris  1945  [id number]      HISTORY OF PRESENT ILLNESS:  Ms. Arthur Epps is a 78 y.o. female returns for a follow up visit for pain management  She has a diagnosis of   1. Chronic pain syndrome    2. Osteoarthritis of spine with radiculopathy, lumbar region    3. DDD (degenerative disc disease), lumbar    4. Fibromyalgia    5. Trochanteric bursitis of right hip    6. Degeneration of lumbar or lumbosacral intervertebral disc    7. Lumbar spondylosis    8. Gastroesophageal reflux disease without esophagitis    . She complains of pain in the  Low back, right hip   She rates the pain 6/10 and describes it as aching. Current treatment regimen has helped relieve about 40% of the pain. She denies any side effects from the current pain regimen. Patient reports that since the last follow up visit the physical functioning is unchanged, family/social relationships are unchanged, mood is unchanged sleep patterns are unchanged, and that the overall functioning is unchanged. Patient denies misusing/abusing her narcotic pain medications or using any illegal drugs. There are No indicators for possible drug abuse, addiction or diversion problems. Patient states she has been doing about the same. Ms. Arthur Epps mentions she is using Mobic along with all the other adjuvants and Percocet 3-4 per day, she denies any side effects. Patient states the pain is greater in the back than the legs. Patient reports her breathing has been okay, she is off smoking now. ALLERGIES: Patients list of allergies were reviewed     MEDICATIONS: Ms. Arthur Epps list of medications were reviewed. Her current medications are   Outpatient Medications Prior to Visit   Medication Sig Dispense Refill    oxyCODONE-acetaminophen (PERCOCET) 7.5-325 MG per tablet Take 1 tablet by mouth every 6 hours as needed for Pain (max 3-4) for up to 35 days. 120 tablet 0    gabapentin (NEURONTIN) 600 MG tablet Take 1 tablet by mouth 2 times daily for 60 days.  63

## 2023-08-08 ENCOUNTER — OFFICE VISIT (OUTPATIENT)
Dept: PAIN MANAGEMENT | Age: 78
End: 2023-08-08
Payer: MEDICARE

## 2023-08-08 VITALS
DIASTOLIC BLOOD PRESSURE: 90 MMHG | WEIGHT: 147 LBS | SYSTOLIC BLOOD PRESSURE: 140 MMHG | HEART RATE: 77 BPM | OXYGEN SATURATION: 94 % | BODY MASS INDEX: 25.23 KG/M2

## 2023-08-08 DIAGNOSIS — M51.36 DDD (DEGENERATIVE DISC DISEASE), LUMBAR: ICD-10-CM

## 2023-08-08 DIAGNOSIS — M79.7 FIBROMYALGIA: ICD-10-CM

## 2023-08-08 DIAGNOSIS — M51.37 DEGENERATION OF LUMBAR OR LUMBOSACRAL INTERVERTEBRAL DISC: ICD-10-CM

## 2023-08-08 DIAGNOSIS — F33.1 MODERATE EPISODE OF RECURRENT MAJOR DEPRESSIVE DISORDER (HCC): ICD-10-CM

## 2023-08-08 DIAGNOSIS — K21.9 GASTROESOPHAGEAL REFLUX DISEASE WITHOUT ESOPHAGITIS: ICD-10-CM

## 2023-08-08 DIAGNOSIS — F51.01 PRIMARY INSOMNIA: ICD-10-CM

## 2023-08-08 DIAGNOSIS — M47.26 OSTEOARTHRITIS OF SPINE WITH RADICULOPATHY, LUMBAR REGION: ICD-10-CM

## 2023-08-08 DIAGNOSIS — M70.61 TROCHANTERIC BURSITIS OF RIGHT HIP: ICD-10-CM

## 2023-08-08 DIAGNOSIS — M47.816 LUMBAR SPONDYLOSIS: ICD-10-CM

## 2023-08-08 DIAGNOSIS — G89.4 CHRONIC PAIN SYNDROME: ICD-10-CM

## 2023-08-08 PROCEDURE — G8427 DOCREV CUR MEDS BY ELIG CLIN: HCPCS | Performed by: INTERNAL MEDICINE

## 2023-08-08 PROCEDURE — 1123F ACP DISCUSS/DSCN MKR DOCD: CPT | Performed by: INTERNAL MEDICINE

## 2023-08-08 PROCEDURE — 3017F COLORECTAL CA SCREEN DOC REV: CPT | Performed by: INTERNAL MEDICINE

## 2023-08-08 PROCEDURE — 1036F TOBACCO NON-USER: CPT | Performed by: INTERNAL MEDICINE

## 2023-08-08 PROCEDURE — G8399 PT W/DXA RESULTS DOCUMENT: HCPCS | Performed by: INTERNAL MEDICINE

## 2023-08-08 PROCEDURE — 1090F PRES/ABSN URINE INCON ASSESS: CPT | Performed by: INTERNAL MEDICINE

## 2023-08-08 PROCEDURE — 99214 OFFICE O/P EST MOD 30 MIN: CPT | Performed by: INTERNAL MEDICINE

## 2023-08-08 PROCEDURE — G8419 CALC BMI OUT NRM PARAM NOF/U: HCPCS | Performed by: INTERNAL MEDICINE

## 2023-08-08 RX ORDER — OXYCODONE AND ACETAMINOPHEN 7.5; 325 MG/1; MG/1
1 TABLET ORAL EVERY 6 HOURS PRN
Qty: 100 TABLET | Refills: 0 | Status: SHIPPED | OUTPATIENT
Start: 2023-08-08 | End: 2023-09-05

## 2023-08-08 RX ORDER — MELOXICAM 15 MG/1
TABLET ORAL
Qty: 30 TABLET | Refills: 0 | Status: SHIPPED | OUTPATIENT
Start: 2023-08-08

## 2023-08-08 RX ORDER — GABAPENTIN 600 MG/1
600 TABLET ORAL 2 TIMES DAILY
Qty: 60 TABLET | Refills: 1 | Status: SHIPPED | OUTPATIENT
Start: 2023-08-08 | End: 2023-10-07

## 2023-08-08 RX ORDER — TRAZODONE HYDROCHLORIDE 50 MG/1
50-100 TABLET ORAL NIGHTLY
Qty: 60 TABLET | Refills: 1 | Status: SHIPPED | OUTPATIENT
Start: 2023-08-08

## 2023-08-08 RX ORDER — PANTOPRAZOLE SODIUM 40 MG/1
40 TABLET, DELAYED RELEASE ORAL DAILY
Qty: 30 TABLET | Refills: 1 | Status: SHIPPED | OUTPATIENT
Start: 2023-08-08

## 2023-08-08 NOTE — PROGRESS NOTES
puff into the lungs daily 1 each 11    aspirin 81 MG EC tablet Take 1 tablet by mouth in the morning. 30 tablet 3    albuterol sulfate  (90 Base) MCG/ACT inhaler Inhale 2 puffs into the lungs every 6 hours as needed for Shortness of Breath 1 each 11    Calcium Citrate-Vitamin D 500-500 MG-UNIT CHEW Take 1 tablet by mouth 2 times daily      ONE TOUCH LANCETS MISC 1 each by Does not apply route daily 100 each 3    Cholecalciferol (VITAMIN D) 2000 units TABS tablet Take 1 tablet by mouth daily 30 tablet     cetirizine (ZYRTEC) 10 MG tablet Take 1 tablet by mouth daily as needed for Allergies      Nebulizers (COMPRESSOR/NEBULIZER) MISC 1 Units by Does not apply route 4 times daily 1 each 3     Current Facility-Administered Medications   Medication Dose Route Frequency Provider Last Rate Last Admin    triamcinolone acetonide (KENALOG-40) injection 40 mg  40 mg IntraMUSCular Once Radha Garcia MD            Goals of current treatment regimen include improvement in pain, restoration of functioning- with focus on improvement in physical performance, general activity, work or disability,emotional distress, health care utilization and  decreased medication consumption. Will continue to monitor progress towards achieving/maintaining therapeutic goals with special emphasis on  1. Improvement in perceived interfernce  of pain with ADL's. Ability to do home exercises independently. Ability to do household chores indoor and/or outdoor work and social and leisure activities. To increase flexibility/ROM, strength and endurance. Improve psychosocial and physical functioning.- she is showing progression towards this treatment goal with the current regimen. 2. Improving sleep to 6-7 hours a night. Improve mood/ anxiety and depression symptoms such as crying spells, low energy, problems with concentration, motivation. - she is showing progression towards this treatment goal with the current regimen.    3. Reduction of reliance

## 2023-09-05 ENCOUNTER — OFFICE VISIT (OUTPATIENT)
Dept: PAIN MANAGEMENT | Age: 78
End: 2023-09-05
Payer: MEDICARE

## 2023-09-05 VITALS
DIASTOLIC BLOOD PRESSURE: 76 MMHG | BODY MASS INDEX: 25.58 KG/M2 | OXYGEN SATURATION: 96 % | WEIGHT: 149 LBS | SYSTOLIC BLOOD PRESSURE: 129 MMHG | HEART RATE: 62 BPM

## 2023-09-05 DIAGNOSIS — M51.36 DDD (DEGENERATIVE DISC DISEASE), LUMBAR: ICD-10-CM

## 2023-09-05 DIAGNOSIS — M79.7 FIBROMYALGIA: ICD-10-CM

## 2023-09-05 DIAGNOSIS — M70.61 TROCHANTERIC BURSITIS OF RIGHT HIP: ICD-10-CM

## 2023-09-05 DIAGNOSIS — G89.4 CHRONIC PAIN SYNDROME: ICD-10-CM

## 2023-09-05 DIAGNOSIS — E78.00 HYPERCHOLESTEREMIA: ICD-10-CM

## 2023-09-05 DIAGNOSIS — M47.26 OSTEOARTHRITIS OF SPINE WITH RADICULOPATHY, LUMBAR REGION: ICD-10-CM

## 2023-09-05 DIAGNOSIS — M47.816 LUMBAR SPONDYLOSIS: ICD-10-CM

## 2023-09-05 DIAGNOSIS — M51.37 DEGENERATION OF LUMBAR OR LUMBOSACRAL INTERVERTEBRAL DISC: ICD-10-CM

## 2023-09-05 PROCEDURE — 99213 OFFICE O/P EST LOW 20 MIN: CPT | Performed by: INTERNAL MEDICINE

## 2023-09-05 PROCEDURE — 1036F TOBACCO NON-USER: CPT | Performed by: INTERNAL MEDICINE

## 2023-09-05 PROCEDURE — G8399 PT W/DXA RESULTS DOCUMENT: HCPCS | Performed by: INTERNAL MEDICINE

## 2023-09-05 PROCEDURE — 1090F PRES/ABSN URINE INCON ASSESS: CPT | Performed by: INTERNAL MEDICINE

## 2023-09-05 PROCEDURE — G8427 DOCREV CUR MEDS BY ELIG CLIN: HCPCS | Performed by: INTERNAL MEDICINE

## 2023-09-05 PROCEDURE — G8419 CALC BMI OUT NRM PARAM NOF/U: HCPCS | Performed by: INTERNAL MEDICINE

## 2023-09-05 PROCEDURE — 1123F ACP DISCUSS/DSCN MKR DOCD: CPT | Performed by: INTERNAL MEDICINE

## 2023-09-05 RX ORDER — GABAPENTIN 600 MG/1
600 TABLET ORAL 2 TIMES DAILY
Qty: 60 TABLET | Refills: 1 | Status: SHIPPED | OUTPATIENT
Start: 2023-09-05 | End: 2023-11-04

## 2023-09-05 RX ORDER — PANTOPRAZOLE SODIUM 40 MG/1
40 TABLET, DELAYED RELEASE ORAL DAILY
Qty: 30 TABLET | Refills: 1 | Status: SHIPPED | OUTPATIENT
Start: 2023-09-05

## 2023-09-05 RX ORDER — ATORVASTATIN CALCIUM 80 MG/1
TABLET, FILM COATED ORAL
Qty: 90 TABLET | Refills: 1 | Status: SHIPPED | OUTPATIENT
Start: 2023-09-05

## 2023-09-05 RX ORDER — OXYCODONE AND ACETAMINOPHEN 7.5; 325 MG/1; MG/1
1 TABLET ORAL EVERY 6 HOURS PRN
Qty: 100 TABLET | Refills: 0 | Status: SHIPPED | OUTPATIENT
Start: 2023-09-05 | End: 2023-10-03

## 2023-09-05 RX ORDER — MELOXICAM 15 MG/1
TABLET ORAL
Qty: 30 TABLET | Refills: 0 | Status: SHIPPED | OUTPATIENT
Start: 2023-09-05

## 2023-09-05 RX ORDER — NALOXONE HYDROCHLORIDE 4 MG/.1ML
1 SPRAY NASAL PRN
Qty: 1 EACH | Refills: 0 | Status: SHIPPED | OUTPATIENT
Start: 2023-09-05

## 2023-09-06 DIAGNOSIS — M81.0 AGE-RELATED OSTEOPOROSIS WITHOUT CURRENT PATHOLOGICAL FRACTURE: ICD-10-CM

## 2023-09-06 NOTE — PROGRESS NOTES
Clora Rinne  1945  [id number]      HISTORY OF PRESENT ILLNESS:  Ms. Fracisco Cohn is a 70 y.o. female returns for a follow up visit for pain management  She has a diagnosis of   1. Chronic pain syndrome    2. Osteoarthritis of spine with radiculopathy, lumbar region    3. DDD (degenerative disc disease), lumbar    4. Fibromyalgia    5. Degeneration of lumbar or lumbosacral intervertebral disc    6. Trochanteric bursitis of right hip    7. Lumbar spondylosis    8. At risk for respiratory depression due to opioid    . As per information/history obtained from the PADT(patient assessment and documentation tool) -  She complains of pain in the lower back and buttocks She rates the pain 6/10 and describes it as aching. Pain is made worse by: movement, walking, standing, sitting, bending, lifting. She denies any side effects from the current pain regimen. Patient reports that since starting the current regimen under my care the physical functioning is better, family/social relationships are better, mood is better sleep patterns are better, and that the overall functioning is better. Patient denies misusing/abusing her narcotic pain medications or using any illegal drugs. There are No indicators for possible drug abuse, addiction or diversion problems. Patient states she has been doing about the same, she is managing with the medications. Ms. Fracisco Cohn states she has been using Percocet 3-4 per day along with other adjuvants. Patient reports her breathing has been okay. ALLERGIES: Patients list of allergies were reviewed     MEDICATIONS: Ms. Fracisco Cohn list of medications were reviewed. Her current medications are   Outpatient Medications Prior to Visit   Medication Sig Dispense Refill    traZODone (DESYREL) 50 MG tablet Take 1-2 tablets by mouth nightly 60 tablet 1    alendronate (FOSAMAX) 70 MG tablet TAKE 1 TABLET BY MOUTH ONE TIME PER WEEK 12 tablet 1    DULoxetine (CYMBALTA) 60 MG extended release capsule TAKE

## 2023-09-07 RX ORDER — ALENDRONATE SODIUM 70 MG/1
TABLET ORAL
Qty: 12 TABLET | Refills: 0 | Status: SHIPPED | OUTPATIENT
Start: 2023-09-07

## 2023-10-03 ENCOUNTER — OFFICE VISIT (OUTPATIENT)
Dept: PAIN MANAGEMENT | Age: 78
End: 2023-10-03
Payer: MEDICARE

## 2023-10-03 VITALS
WEIGHT: 151 LBS | DIASTOLIC BLOOD PRESSURE: 75 MMHG | SYSTOLIC BLOOD PRESSURE: 123 MMHG | HEART RATE: 73 BPM | BODY MASS INDEX: 25.92 KG/M2 | OXYGEN SATURATION: 92 %

## 2023-10-03 DIAGNOSIS — M47.816 LUMBAR SPONDYLOSIS: ICD-10-CM

## 2023-10-03 DIAGNOSIS — M70.61 TROCHANTERIC BURSITIS OF RIGHT HIP: ICD-10-CM

## 2023-10-03 DIAGNOSIS — R25.2 MUSCLE CRAMPING: ICD-10-CM

## 2023-10-03 DIAGNOSIS — M79.7 FIBROMYALGIA: ICD-10-CM

## 2023-10-03 DIAGNOSIS — M51.36 DDD (DEGENERATIVE DISC DISEASE), LUMBAR: ICD-10-CM

## 2023-10-03 DIAGNOSIS — M47.26 OSTEOARTHRITIS OF SPINE WITH RADICULOPATHY, LUMBAR REGION: ICD-10-CM

## 2023-10-03 DIAGNOSIS — M51.37 DEGENERATION OF LUMBAR OR LUMBOSACRAL INTERVERTEBRAL DISC: ICD-10-CM

## 2023-10-03 DIAGNOSIS — G89.4 CHRONIC PAIN SYNDROME: ICD-10-CM

## 2023-10-03 PROCEDURE — G8419 CALC BMI OUT NRM PARAM NOF/U: HCPCS | Performed by: INTERNAL MEDICINE

## 2023-10-03 PROCEDURE — G8399 PT W/DXA RESULTS DOCUMENT: HCPCS | Performed by: INTERNAL MEDICINE

## 2023-10-03 PROCEDURE — G8427 DOCREV CUR MEDS BY ELIG CLIN: HCPCS | Performed by: INTERNAL MEDICINE

## 2023-10-03 PROCEDURE — 1036F TOBACCO NON-USER: CPT | Performed by: INTERNAL MEDICINE

## 2023-10-03 PROCEDURE — 1090F PRES/ABSN URINE INCON ASSESS: CPT | Performed by: INTERNAL MEDICINE

## 2023-10-03 PROCEDURE — G8484 FLU IMMUNIZE NO ADMIN: HCPCS | Performed by: INTERNAL MEDICINE

## 2023-10-03 PROCEDURE — 1123F ACP DISCUSS/DSCN MKR DOCD: CPT | Performed by: INTERNAL MEDICINE

## 2023-10-03 PROCEDURE — 99213 OFFICE O/P EST LOW 20 MIN: CPT | Performed by: INTERNAL MEDICINE

## 2023-10-03 RX ORDER — GABAPENTIN 600 MG/1
600 TABLET ORAL 2 TIMES DAILY
Qty: 60 TABLET | Refills: 1 | Status: SHIPPED | OUTPATIENT
Start: 2023-10-03 | End: 2023-12-02

## 2023-10-03 RX ORDER — MELOXICAM 15 MG/1
TABLET ORAL
Qty: 30 TABLET | Refills: 0 | Status: SHIPPED | OUTPATIENT
Start: 2023-10-03

## 2023-10-03 RX ORDER — OXYCODONE AND ACETAMINOPHEN 7.5; 325 MG/1; MG/1
1 TABLET ORAL EVERY 6 HOURS PRN
Qty: 100 TABLET | Refills: 0 | Status: SHIPPED | OUTPATIENT
Start: 2023-10-03 | End: 2023-10-31

## 2023-10-03 RX ORDER — TRAZODONE HYDROCHLORIDE 50 MG/1
50-100 TABLET ORAL NIGHTLY
Qty: 60 TABLET | Refills: 1 | Status: SHIPPED | OUTPATIENT
Start: 2023-10-03

## 2023-10-03 RX ORDER — PANTOPRAZOLE SODIUM 40 MG/1
40 TABLET, DELAYED RELEASE ORAL DAILY
Qty: 30 TABLET | Refills: 1 | Status: SHIPPED | OUTPATIENT
Start: 2023-10-03

## 2023-10-03 NOTE — PROGRESS NOTES
-Walking as tolerated   -Adv Biofeedback, relaxation and meditation techniques, mindfulness- based stress reduction. Increase social/leisure activities. CBT techniques , Mind body practices (yoga, Heladio- chi ) were also recommended. Current Outpatient Medications   Medication Sig Dispense Refill    alendronate (FOSAMAX) 70 MG tablet TAKE 1 TABLET BY MOUTH 1 TIME A WEEK 12 tablet 0    oxyCODONE-acetaminophen (PERCOCET) 7.5-325 MG per tablet Take 1 tablet by mouth every 6 hours as needed for Pain (max 3-4) for up to 28 days. 100 tablet 0    gabapentin (NEURONTIN) 600 MG tablet Take 1 tablet by mouth 2 times daily for 60 days. 60 tablet 1    pantoprazole (PROTONIX) 40 MG tablet Take 1 tablet by mouth daily 30 tablet 1    meloxicam (MOBIC) 15 MG tablet Take 1 tablet po on Mon, Wed, Fri 30 tablet 0    naloxone (NARCAN) 4 MG/0.1ML LIQD nasal spray 1 spray by Nasal route as needed for Opioid Reversal 1 each 0    atorvastatin (LIPITOR) 80 MG tablet TAKE 1 TABLET BY MOUTH EVERY DAY FOR CHOLESTEROL 90 tablet 1    traZODone (DESYREL) 50 MG tablet Take 1-2 tablets by mouth nightly 60 tablet 1    DULoxetine (CYMBALTA) 60 MG extended release capsule TAKE 1 CAPSULE BY MOUTH TWICE A  capsule 1    ipratropium-albuterol (DUONEB) 0.5-2.5 (3) MG/3ML SOLN nebulizer solution Take 1 aerosol every 4-6 hours as needed for shortness of breath coughing and wheezing 360 mL 0    Fluticasone-Umeclidin-Vilant (TRELEGY ELLIPTA) 200-62.5-25 MCG/INH AEPB Inhale 1 puff into the lungs daily 1 each 11    aspirin 81 MG EC tablet Take 1 tablet by mouth in the morning.  30 tablet 3    albuterol sulfate  (90 Base) MCG/ACT inhaler Inhale 2 puffs into the lungs every 6 hours as needed for Shortness of Breath 1 each 11    Calcium Citrate-Vitamin D 500-500 MG-UNIT CHEW Take 1 tablet by mouth 2 times daily      ONE TOUCH LANCETS MISC 1 each by Does not apply route daily 100 each 3    Cholecalciferol (VITAMIN D) 2000 units TABS tablet Take 1

## 2023-10-12 ENCOUNTER — HOSPITAL ENCOUNTER (OUTPATIENT)
Dept: CT IMAGING | Age: 78
Discharge: HOME | End: 2023-10-12
Payer: MEDICARE

## 2023-10-12 DIAGNOSIS — R91.1 PULMONARY NODULE SEEN ON IMAGING STUDY: Primary | ICD-10-CM

## 2023-10-12 DIAGNOSIS — Z87.891 PERSONAL HISTORY OF TOBACCO USE: ICD-10-CM

## 2023-10-12 PROCEDURE — 71271 CT THORAX LUNG CANCER SCR C-: CPT

## 2023-10-12 NOTE — PROGRESS NOTES
Called and advise of nodule.       Will discuss more     Future Appointments   Date Time Provider [address]   10/16/2023 10:00 AM Severiano Grippe, MD [facility] PULM MMA   10/19/2023 11:00 AM Vinod Underwood APRN - CNP [facility] AT [facility] - DYD   10/31/2023 11:00 AM Chris Kirby MD [facility]

## 2023-10-16 ENCOUNTER — OFFICE VISIT (OUTPATIENT)
Dept: PULMONOLOGY | Age: 78
End: 2023-10-16
Payer: MEDICARE

## 2023-10-16 VITALS
BODY MASS INDEX: 25.78 KG/M2 | SYSTOLIC BLOOD PRESSURE: 100 MMHG | DIASTOLIC BLOOD PRESSURE: 60 MMHG | WEIGHT: 151 LBS | OXYGEN SATURATION: 95 % | TEMPERATURE: 96.9 F | RESPIRATION RATE: 16 BRPM | HEIGHT: 64 IN | HEART RATE: 66 BPM

## 2023-10-16 DIAGNOSIS — R93.89 ABNORMAL CT OF THE CHEST: ICD-10-CM

## 2023-10-16 DIAGNOSIS — J96.11 CHRONIC HYPOXEMIC RESPIRATORY FAILURE (HCC): ICD-10-CM

## 2023-10-16 DIAGNOSIS — J44.9 COPD, SEVERE (HCC): Primary | ICD-10-CM

## 2023-10-16 PROCEDURE — 1036F TOBACCO NON-USER: CPT | Performed by: INTERNAL MEDICINE

## 2023-10-16 PROCEDURE — 99214 OFFICE O/P EST MOD 30 MIN: CPT | Performed by: INTERNAL MEDICINE

## 2023-10-16 PROCEDURE — 3023F SPIROM DOC REV: CPT | Performed by: INTERNAL MEDICINE

## 2023-10-16 PROCEDURE — G8419 CALC BMI OUT NRM PARAM NOF/U: HCPCS | Performed by: INTERNAL MEDICINE

## 2023-10-16 PROCEDURE — 1090F PRES/ABSN URINE INCON ASSESS: CPT | Performed by: INTERNAL MEDICINE

## 2023-10-16 PROCEDURE — G8399 PT W/DXA RESULTS DOCUMENT: HCPCS | Performed by: INTERNAL MEDICINE

## 2023-10-16 PROCEDURE — G8484 FLU IMMUNIZE NO ADMIN: HCPCS | Performed by: INTERNAL MEDICINE

## 2023-10-16 PROCEDURE — G8427 DOCREV CUR MEDS BY ELIG CLIN: HCPCS | Performed by: INTERNAL MEDICINE

## 2023-10-16 PROCEDURE — 1123F ACP DISCUSS/DSCN MKR DOCD: CPT | Performed by: INTERNAL MEDICINE

## 2023-10-16 RX ORDER — FLUTICASONE FUROATE AND VILANTEROL 200; 25 UG/1; UG/1
1 POWDER RESPIRATORY (INHALATION) DAILY
Qty: 1 EACH | Refills: 11 | Status: SHIPPED | OUTPATIENT
Start: 2023-10-16

## 2023-10-16 NOTE — ASSESSMENT & PLAN NOTE
Discussed pulmonary nodule. Concerning. Will follow with repeat ct with monarch protocol to further assess and assess if able to biopsy with navigational bronchoscopy.

## 2023-10-17 ENCOUNTER — TELEPHONE (OUTPATIENT)
Dept: CASE MANAGEMENT | Age: 78
End: 2023-10-17

## 2023-10-17 NOTE — TELEPHONE ENCOUNTER
CT Lung Cancer Screening completed on 10/12/23, ordering provider, Dr. Ailin Baker reviewed radiology results with recommendations & discussed with patient at her [address] with him on 10/16/23. Smoking history reviewed.

## 2023-10-19 ENCOUNTER — OFFICE VISIT (OUTPATIENT)
Dept: FAMILY MEDICINE CLINIC | Age: 78
End: 2023-10-19

## 2023-10-19 VITALS
WEIGHT: 146 LBS | HEART RATE: 80 BPM | BODY MASS INDEX: 24.92 KG/M2 | HEIGHT: 64 IN | OXYGEN SATURATION: 94 % | SYSTOLIC BLOOD PRESSURE: 134 MMHG | RESPIRATION RATE: 16 BRPM | DIASTOLIC BLOOD PRESSURE: 82 MMHG

## 2023-10-19 DIAGNOSIS — E11.9 CONTROLLED TYPE 2 DIABETES MELLITUS WITHOUT COMPLICATION, WITHOUT LONG-TERM CURRENT USE OF INSULIN (HCC): ICD-10-CM

## 2023-10-19 DIAGNOSIS — Z00.00 MEDICARE ANNUAL WELLNESS VISIT, SUBSEQUENT: Primary | ICD-10-CM

## 2023-10-19 DIAGNOSIS — E78.00 HYPERCHOLESTEREMIA: ICD-10-CM

## 2023-10-19 DIAGNOSIS — M81.0 AGE-RELATED OSTEOPOROSIS WITHOUT CURRENT PATHOLOGICAL FRACTURE: ICD-10-CM

## 2023-10-19 DIAGNOSIS — J44.9 COPD, SEVERE (HCC): ICD-10-CM

## 2023-10-19 DIAGNOSIS — Z23 NEED FOR INFLUENZA VACCINATION: ICD-10-CM

## 2023-10-19 ASSESSMENT — PATIENT HEALTH QUESTIONNAIRE - PHQ9
10. IF YOU CHECKED OFF ANY PROBLEMS, HOW DIFFICULT HAVE THESE PROBLEMS MADE IT FOR YOU TO DO YOUR WORK, TAKE CARE OF THINGS AT HOME, OR GET ALONG WITH OTHER PEOPLE: 0
1. LITTLE INTEREST OR PLEASURE IN DOING THINGS: 1
SUM OF ALL RESPONSES TO PHQ QUESTIONS 1-9: 2
5. POOR APPETITE OR OVEREATING: 0
4. FEELING TIRED OR HAVING LITTLE ENERGY: 0
2. FEELING DOWN, DEPRESSED OR HOPELESS: 1
SUM OF ALL RESPONSES TO PHQ QUESTIONS 1-9: 2
8. MOVING OR SPEAKING SO SLOWLY THAT OTHER PEOPLE COULD HAVE NOTICED. OR THE OPPOSITE, BEING SO FIGETY OR RESTLESS THAT YOU HAVE BEEN MOVING AROUND A LOT MORE THAN USUAL: 0
9. THOUGHTS THAT YOU WOULD BE BETTER OFF DEAD, OR OF HURTING YOURSELF: 0
SUM OF ALL RESPONSES TO PHQ QUESTIONS 1-9: 2
6. FEELING BAD ABOUT YOURSELF - OR THAT YOU ARE A FAILURE OR HAVE LET YOURSELF OR YOUR FAMILY DOWN: 0
7. TROUBLE CONCENTRATING ON THINGS, SUCH AS READING THE NEWSPAPER OR WATCHING TELEVISION: 0
3. TROUBLE FALLING OR STAYING ASLEEP: 0
SUM OF ALL RESPONSES TO PHQ9 QUESTIONS 1 & 2: 2
SUM OF ALL RESPONSES TO PHQ QUESTIONS 1-9: 2

## 2023-10-19 ASSESSMENT — COLUMBIA-SUICIDE SEVERITY RATING SCALE - C-SSRS
5. HAVE YOU STARTED TO WORK OUT OR WORKED OUT THE DETAILS OF HOW TO KILL YOURSELF? DO YOU INTEND TO CARRY OUT THIS PLAN?: NO
3. HAVE YOU BEEN THINKING ABOUT HOW YOU MIGHT KILL YOURSELF?: NO
7. DID THIS OCCUR IN THE LAST THREE MONTHS: NO
4. HAVE YOU HAD THESE THOUGHTS AND HAD SOME INTENTION OF ACTING ON THEM?: NO

## 2023-10-19 ASSESSMENT — LIFESTYLE VARIABLES
HOW OFTEN DO YOU HAVE A DRINK CONTAINING ALCOHOL: NEVER
HOW MANY STANDARD DRINKS CONTAINING ALCOHOL DO YOU HAVE ON A TYPICAL DAY: PATIENT DOES NOT DRINK

## 2023-10-19 NOTE — PROGRESS NOTES
Medicare Annual Wellness Visit    Shanika Gillespie is here for Medicare AWV    Assessment & Plan   Medicare annual wellness visit, subsequent  -     Comprehensive Metabolic Panel; Future  -     TSH with Reflex; Future  -     Vitamin D 25 Hydroxy; Future  -     CBC with Auto Differential; Future  Healthy lifestyles reviewed: diet, aerobic exercise, sunscreen, vision and dental exams  Advised to obtain her COVID-19 booster at her pharmacy     Controlled type 2 diabetes mellitus without complication, without long-term current use of insulin ([address])  -     Comprehensive Metabolic Panel; Future  -     Hemoglobin A1C; Future  History of diet controlled  Continue to work on low carb diet and physical activity daily     Hypercholesteremia  -     Comprehensive Metabolic Panel; Future  On atorvastatin 80 mg daily   Has order from cardiologist to repeat lipids    Age-related osteoporosis without current pathological fracture- started alendronate 9/2021  -     Vitamin D 25 Hydroxy; Future  -     DEXA BONE DENSITY AXIAL SKELETON; Future  -     denosumab (PROLIA) 60 MG/ML SOSY SC injection; Inject 1 mL into the skin once for 1 dose, Disp-1 mL, R-0Print  Patient reports that she is not able to tolerate alendronate. She would like to switch to prolia   Advised calcium and vitamin D supplement BID  Advised weight bearing exercises daily     COPD, severe (HCC)  Stable. Continue f/u with Dr. Ailin Baker. Medications per pulmonologist     Need for influenza vaccination  -     Influenza, FLUAD, (age 72 y+), IM, PF, 0.5 mL    Recommendations for Preventive Services Due: see orders and patient instructions/AVS.  Recommended screening schedule for the next 5-10 years is provided to the patient in written form: see Patient Instructions/AVS.     Return in about 6 months (around 4/19/2024), or if symptoms worsen or fail to improve, for chronic conditions.      Subjective   The following acute and/or chronic problems were also addressed

## 2023-10-20 PROBLEM — M81.0 SENILE OSTEOPOROSIS: Status: ACTIVE | Noted: 2023-10-20

## 2023-10-27 DIAGNOSIS — M81.0 AGE-RELATED OSTEOPOROSIS WITHOUT CURRENT PATHOLOGICAL FRACTURE: ICD-10-CM

## 2023-10-27 DIAGNOSIS — E11.9 CONTROLLED TYPE 2 DIABETES MELLITUS WITHOUT COMPLICATION, WITHOUT LONG-TERM CURRENT USE OF INSULIN (HCC): ICD-10-CM

## 2023-10-27 DIAGNOSIS — E78.00 HYPERCHOLESTEREMIA: ICD-10-CM

## 2023-10-27 DIAGNOSIS — Z00.00 MEDICARE ANNUAL WELLNESS VISIT, SUBSEQUENT: ICD-10-CM

## 2023-10-27 DIAGNOSIS — E78.5 HYPERLIPIDEMIA LDL GOAL <70: ICD-10-CM

## 2023-10-27 LAB
25(OH)D3 SERPL-MCNC: 32.5 NG/ML
ALBUMIN SERPL-MCNC: 4.3 G/DL (ref 3.4–5)
ALBUMIN/GLOB SERPL: 1.9 {RATIO} (ref 1.1–2.2)
ALP SERPL-CCNC: 80 U/L (ref 40–129)
ALT SERPL-CCNC: 9 U/L (ref 10–40)
ANION GAP SERPL CALCULATED.3IONS-SCNC: 11 MMOL/L (ref 3–16)
AST SERPL-CCNC: 13 U/L (ref 15–37)
BASOPHILS # BLD: 0 K/UL (ref 0–0.2)
BASOPHILS NFR BLD: 0.7 %
BILIRUB SERPL-MCNC: 0.4 MG/DL (ref 0–1)
BUN SERPL-MCNC: 14 MG/DL (ref 7–20)
CALCIUM SERPL-MCNC: 9.5 MG/DL (ref 8.3–10.6)
CHLORIDE SERPL-SCNC: 105 MMOL/L (ref 99–110)
CHOLEST SERPL-MCNC: 172 MG/DL (ref 0–199)
CO2 SERPL-SCNC: 28 MMOL/L (ref 21–32)
CREAT SERPL-MCNC: 1.1 MG/DL (ref 0.6–1.2)
DEPRECATED RDW RBC AUTO: 14.1 % (ref 12.4–15.4)
EOSINOPHIL # BLD: 0.5 K/UL (ref 0–0.6)
EOSINOPHIL NFR BLD: 8.9 %
GFR SERPLBLD CREATININE-BSD FMLA CKD-EPI: 52 ML/MIN/{1.73_M2}
GLUCOSE SERPL-MCNC: 109 MG/DL (ref 70–99)
HCT VFR BLD AUTO: 39.4 % (ref 36–48)
HDLC SERPL-MCNC: 46 MG/DL (ref 40–60)
HGB BLD-MCNC: 12.9 G/DL (ref 12–16)
LDL CHOLESTEROL CALCULATED: 93 MG/DL
LYMPHOCYTES # BLD: 1.5 K/UL (ref 1–5.1)
LYMPHOCYTES NFR BLD: 25.7 %
MCH RBC QN AUTO: 30.7 PG (ref 26–34)
MCHC RBC AUTO-ENTMCNC: 32.7 G/DL (ref 31–36)
MCV RBC AUTO: 94 FL (ref 80–100)
MONOCYTES # BLD: 0.3 K/UL (ref 0–1.3)
MONOCYTES NFR BLD: 5.6 %
NEUTROPHILS # BLD: 3.4 K/UL (ref 1.7–7.7)
NEUTROPHILS NFR BLD: 59.1 %
PLATELET # BLD AUTO: 333 K/UL (ref 135–450)
PMV BLD AUTO: 7.6 FL (ref 5–10.5)
POTASSIUM SERPL-SCNC: 4.7 MMOL/L (ref 3.5–5.1)
PROT SERPL-MCNC: 6.6 G/DL (ref 6.4–8.2)
RBC # BLD AUTO: 4.19 M/UL (ref 4–5.2)
SODIUM SERPL-SCNC: 144 MMOL/L (ref 136–145)
TRIGL SERPL-MCNC: 163 MG/DL (ref 0–150)
TSH SERPL DL<=0.005 MIU/L-ACNC: 0.93 UIU/ML (ref 0.27–4.2)
VLDLC SERPL CALC-MCNC: 33 MG/DL
WBC # BLD AUTO: 5.7 K/UL (ref 4–11)

## 2023-10-28 LAB
EST. AVERAGE GLUCOSE BLD GHB EST-MCNC: 125.5 MG/DL
HBA1C MFR BLD: 6 %

## 2023-10-31 ENCOUNTER — OFFICE VISIT (OUTPATIENT)
Dept: PAIN MANAGEMENT | Age: 78
End: 2023-10-31
Payer: MEDICARE

## 2023-10-31 VITALS
SYSTOLIC BLOOD PRESSURE: 134 MMHG | WEIGHT: 153 LBS | DIASTOLIC BLOOD PRESSURE: 79 MMHG | HEART RATE: 85 BPM | BODY MASS INDEX: 26.26 KG/M2

## 2023-10-31 DIAGNOSIS — G89.4 CHRONIC PAIN SYNDROME: ICD-10-CM

## 2023-10-31 DIAGNOSIS — M70.61 TROCHANTERIC BURSITIS OF RIGHT HIP: ICD-10-CM

## 2023-10-31 DIAGNOSIS — M51.37 DEGENERATION OF LUMBAR OR LUMBOSACRAL INTERVERTEBRAL DISC: ICD-10-CM

## 2023-10-31 DIAGNOSIS — M51.36 DDD (DEGENERATIVE DISC DISEASE), LUMBAR: ICD-10-CM

## 2023-10-31 DIAGNOSIS — M47.26 OSTEOARTHRITIS OF SPINE WITH RADICULOPATHY, LUMBAR REGION: ICD-10-CM

## 2023-10-31 DIAGNOSIS — M47.816 LUMBAR SPONDYLOSIS: ICD-10-CM

## 2023-10-31 DIAGNOSIS — M79.7 FIBROMYALGIA: ICD-10-CM

## 2023-10-31 DIAGNOSIS — R25.2 MUSCLE CRAMPING: ICD-10-CM

## 2023-10-31 PROCEDURE — 1036F TOBACCO NON-USER: CPT | Performed by: INTERNAL MEDICINE

## 2023-10-31 PROCEDURE — 1090F PRES/ABSN URINE INCON ASSESS: CPT | Performed by: INTERNAL MEDICINE

## 2023-10-31 PROCEDURE — 1123F ACP DISCUSS/DSCN MKR DOCD: CPT | Performed by: INTERNAL MEDICINE

## 2023-10-31 PROCEDURE — G8427 DOCREV CUR MEDS BY ELIG CLIN: HCPCS | Performed by: INTERNAL MEDICINE

## 2023-10-31 PROCEDURE — G8484 FLU IMMUNIZE NO ADMIN: HCPCS | Performed by: INTERNAL MEDICINE

## 2023-10-31 PROCEDURE — G8419 CALC BMI OUT NRM PARAM NOF/U: HCPCS | Performed by: INTERNAL MEDICINE

## 2023-10-31 PROCEDURE — G8399 PT W/DXA RESULTS DOCUMENT: HCPCS | Performed by: INTERNAL MEDICINE

## 2023-10-31 PROCEDURE — 99213 OFFICE O/P EST LOW 20 MIN: CPT | Performed by: INTERNAL MEDICINE

## 2023-10-31 RX ORDER — OXYCODONE AND ACETAMINOPHEN 7.5; 325 MG/1; MG/1
1 TABLET ORAL EVERY 6 HOURS PRN
Qty: 100 TABLET | Refills: 0 | Status: ON HOLD | OUTPATIENT
Start: 2023-10-31 | End: 2023-11-28

## 2023-10-31 RX ORDER — MELOXICAM 15 MG/1
TABLET ORAL
Qty: 30 TABLET | Refills: 0 | Status: ON HOLD | OUTPATIENT
Start: 2023-10-31

## 2023-10-31 RX ORDER — PANTOPRAZOLE SODIUM 40 MG/1
40 TABLET, DELAYED RELEASE ORAL DAILY
Qty: 30 TABLET | Refills: 1 | Status: ON HOLD | OUTPATIENT
Start: 2023-10-31

## 2023-10-31 RX ORDER — GABAPENTIN 600 MG/1
600 TABLET ORAL 2 TIMES DAILY
Qty: 60 TABLET | Refills: 1 | Status: ON HOLD | OUTPATIENT
Start: 2023-10-31 | End: 2023-12-30

## 2023-10-31 RX ORDER — TRAZODONE HYDROCHLORIDE 50 MG/1
50-100 TABLET ORAL NIGHTLY
Qty: 60 TABLET | Refills: 1 | Status: ON HOLD | OUTPATIENT
Start: 2023-10-31

## 2023-11-02 DIAGNOSIS — G89.4 CHRONIC PAIN SYNDROME: ICD-10-CM

## 2023-11-02 DIAGNOSIS — F33.1 MODERATE EPISODE OF RECURRENT MAJOR DEPRESSIVE DISORDER (HCC): ICD-10-CM

## 2023-11-03 RX ORDER — DULOXETIN HYDROCHLORIDE 60 MG/1
60 CAPSULE, DELAYED RELEASE ORAL DAILY
Qty: 90 CAPSULE | Refills: 0 | Status: ON HOLD | OUTPATIENT
Start: 2023-11-03

## 2023-11-05 ENCOUNTER — HOSPITAL ENCOUNTER (INPATIENT)
Age: 78
LOS: 1 days | Discharge: HOME HEALTH | DRG: 684 | End: 2023-11-06
Attending: HOSPITALIST | Admitting: HOSPITALIST
Payer: MEDICARE

## 2023-11-05 ENCOUNTER — APPOINTMENT (OUTPATIENT)
Dept: CT IMAGING | Age: 78
DRG: 684 | End: 2023-11-05
Payer: MEDICARE

## 2023-11-05 DIAGNOSIS — R94.31 ABNORMAL EKG: Primary | ICD-10-CM

## 2023-11-05 DIAGNOSIS — N17.9 AKI (ACUTE KIDNEY INJURY) (HCC): ICD-10-CM

## 2023-11-05 DIAGNOSIS — R80.9 PROTEINURIA, UNSPECIFIED TYPE: ICD-10-CM

## 2023-11-05 DIAGNOSIS — R11.2 NAUSEA AND VOMITING, UNSPECIFIED VOMITING TYPE: ICD-10-CM

## 2023-11-05 PROBLEM — R11.10 VOMITING: Status: ACTIVE | Noted: 2023-11-05

## 2023-11-05 LAB
ALBUMIN SERPL-MCNC: 4.4 G/DL (ref 3.4–5)
ALBUMIN/GLOB SERPL: 1.6 {RATIO} (ref 1.1–2.2)
ALP SERPL-CCNC: 72 U/L (ref 40–129)
ALT SERPL-CCNC: 15 U/L (ref 10–40)
ANION GAP SERPL CALCULATED.3IONS-SCNC: 16 MMOL/L (ref 3–16)
AST SERPL-CCNC: 23 U/L (ref 15–37)
BACTERIA URNS QL MICRO: ABNORMAL /HPF
BASOPHILS # BLD: 0 K/UL (ref 0–0.2)
BASOPHILS NFR BLD: 1 %
BILIRUB SERPL-MCNC: 0.6 MG/DL (ref 0–1)
BILIRUB UR QL STRIP.AUTO: NEGATIVE
BUN SERPL-MCNC: 24 MG/DL (ref 7–20)
CALCIUM SERPL-MCNC: 9.2 MG/DL (ref 8.3–10.6)
CHARACTER UR: ABNORMAL
CHLORIDE SERPL-SCNC: 99 MMOL/L (ref 99–110)
CLARITY UR: ABNORMAL
CO2 SERPL-SCNC: 23 MMOL/L (ref 21–32)
COLOR UR: YELLOW
CREAT SERPL-MCNC: 1.3 MG/DL (ref 0.6–1.2)
DEPRECATED RDW RBC AUTO: 13.9 % (ref 12.4–15.4)
EOSINOPHIL # BLD: 0.1 K/UL (ref 0–0.6)
EOSINOPHIL NFR BLD: 2.7 %
GFR SERPLBLD CREATININE-BSD FMLA CKD-EPI: 43 ML/MIN/{1.73_M2}
GLUCOSE SERPL-MCNC: 128 MG/DL (ref 70–99)
GLUCOSE UR STRIP.AUTO-MCNC: NEGATIVE MG/DL
HCT VFR BLD AUTO: 45.5 % (ref 36–48)
HGB BLD-MCNC: 15.5 G/DL (ref 12–16)
HGB UR QL STRIP.AUTO: ABNORMAL
KETONES UR STRIP.AUTO-MCNC: 15 MG/DL
LEUKOCYTE ESTERASE UR QL STRIP.AUTO: ABNORMAL
LIPASE SERPL-CCNC: 34 U/L (ref 13–60)
LYMPHOCYTES # BLD: 1 K/UL (ref 1–5.1)
LYMPHOCYTES NFR BLD: 24.6 %
MAGNESIUM SERPL-MCNC: 2 MG/DL (ref 1.8–2.4)
MCH RBC QN AUTO: 30.9 PG (ref 26–34)
MCHC RBC AUTO-ENTMCNC: 34 G/DL (ref 31–36)
MCV RBC AUTO: 90.8 FL (ref 80–100)
MONOCYTES # BLD: 0.6 K/UL (ref 0–1.3)
MONOCYTES NFR BLD: 13.7 %
NEUTROPHILS # BLD: 2.4 K/UL (ref 1.7–7.7)
NEUTROPHILS NFR BLD: 58 %
NITRITE UR QL STRIP.AUTO: NEGATIVE
PH UR STRIP.AUTO: 6 [PH] (ref 5–8)
PLATELET # BLD AUTO: 212 K/UL (ref 135–450)
PMV BLD AUTO: 8 FL (ref 5–10.5)
POTASSIUM SERPL-SCNC: 3.5 MMOL/L (ref 3.5–5.1)
PROT SERPL-MCNC: 7.2 G/DL (ref 6.4–8.2)
PROT UR STRIP.AUTO-MCNC: >=300 MG/DL
RBC # BLD AUTO: 5.01 M/UL (ref 4–5.2)
RBC #/AREA URNS HPF: ABNORMAL /HPF (ref 0–4)
SODIUM SERPL-SCNC: 138 MMOL/L (ref 136–145)
SP GR UR STRIP.AUTO: >=1.03 (ref 1–1.03)
TROPONIN, HIGH SENSITIVITY: 13 NG/L (ref 0–14)
TROPONIN, HIGH SENSITIVITY: 15 NG/L (ref 0–14)
TROPONIN, HIGH SENSITIVITY: 16 NG/L (ref 0–14)
TROPONIN, HIGH SENSITIVITY: 18 NG/L (ref 0–14)
UA COMPLETE W REFLEX CULTURE PNL UR: ABNORMAL
UA DIPSTICK W REFLEX MICRO PNL UR: YES
URN SPEC COLLECT METH UR: ABNORMAL
UROBILINOGEN UR STRIP-ACNC: 0.2 E.U./DL
WBC # BLD AUTO: 4.2 K/UL (ref 4–11)
WBC #/AREA URNS HPF: ABNORMAL /HPF (ref 0–5)

## 2023-11-05 PROCEDURE — 2580000003 HC RX 258: Performed by: HOSPITALIST

## 2023-11-05 PROCEDURE — 1200000000 HC SEMI PRIVATE

## 2023-11-05 PROCEDURE — 74177 CT ABD & PELVIS W/CONTRAST: CPT

## 2023-11-05 PROCEDURE — 6370000000 HC RX 637 (ALT 250 FOR IP): Performed by: HOSPITALIST

## 2023-11-05 PROCEDURE — 6360000002 HC RX W HCPCS: Performed by: PHYSICIAN ASSISTANT

## 2023-11-05 PROCEDURE — 83735 ASSAY OF MAGNESIUM: CPT

## 2023-11-05 PROCEDURE — 2580000003 HC RX 258: Performed by: PHYSICIAN ASSISTANT

## 2023-11-05 PROCEDURE — 94761 N-INVAS EAR/PLS OXIMETRY MLT: CPT

## 2023-11-05 PROCEDURE — 96374 THER/PROPH/DIAG INJ IV PUSH: CPT

## 2023-11-05 PROCEDURE — 80053 COMPREHEN METABOLIC PANEL: CPT

## 2023-11-05 PROCEDURE — 99285 EMERGENCY DEPT VISIT HI MDM: CPT

## 2023-11-05 PROCEDURE — 81001 URINALYSIS AUTO W/SCOPE: CPT

## 2023-11-05 PROCEDURE — 93005 ELECTROCARDIOGRAM TRACING: CPT | Performed by: PHYSICIAN ASSISTANT

## 2023-11-05 PROCEDURE — 6370000000 HC RX 637 (ALT 250 FOR IP): Performed by: PHYSICIAN ASSISTANT

## 2023-11-05 PROCEDURE — 36415 COLL VENOUS BLD VENIPUNCTURE: CPT

## 2023-11-05 PROCEDURE — 83690 ASSAY OF LIPASE: CPT

## 2023-11-05 PROCEDURE — 84484 ASSAY OF TROPONIN QUANT: CPT

## 2023-11-05 PROCEDURE — 6360000002 HC RX W HCPCS: Performed by: HOSPITALIST

## 2023-11-05 PROCEDURE — 94640 AIRWAY INHALATION TREATMENT: CPT

## 2023-11-05 PROCEDURE — 6360000004 HC RX CONTRAST MEDICATION: Performed by: PHYSICIAN ASSISTANT

## 2023-11-05 PROCEDURE — 85025 COMPLETE CBC W/AUTO DIFF WBC: CPT

## 2023-11-05 RX ORDER — PROMETHAZINE HYDROCHLORIDE 25 MG/1
25 TABLET ORAL ONCE
Status: COMPLETED | OUTPATIENT
Start: 2023-11-05 | End: 2023-11-05

## 2023-11-05 RX ORDER — SODIUM CHLORIDE 9 MG/ML
INJECTION, SOLUTION INTRAVENOUS PRN
Status: DISCONTINUED | OUTPATIENT
Start: 2023-11-05 | End: 2023-11-06 | Stop reason: HOSPADM

## 2023-11-05 RX ORDER — ENOXAPARIN SODIUM 100 MG/ML
40 INJECTION SUBCUTANEOUS DAILY
Status: DISCONTINUED | OUTPATIENT
Start: 2023-11-06 | End: 2023-11-06 | Stop reason: HOSPADM

## 2023-11-05 RX ORDER — ONDANSETRON 2 MG/ML
4 INJECTION INTRAMUSCULAR; INTRAVENOUS EVERY 6 HOURS PRN
Status: DISCONTINUED | OUTPATIENT
Start: 2023-11-05 | End: 2023-11-06 | Stop reason: HOSPADM

## 2023-11-05 RX ORDER — PANTOPRAZOLE SODIUM 40 MG/1
40 TABLET, DELAYED RELEASE ORAL DAILY
Status: DISCONTINUED | OUTPATIENT
Start: 2023-11-06 | End: 2023-11-06 | Stop reason: HOSPADM

## 2023-11-05 RX ORDER — GABAPENTIN 300 MG/1
600 CAPSULE ORAL 2 TIMES DAILY
Status: DISCONTINUED | OUTPATIENT
Start: 2023-11-05 | End: 2023-11-06 | Stop reason: HOSPADM

## 2023-11-05 RX ORDER — IPRATROPIUM BROMIDE AND ALBUTEROL SULFATE 2.5; .5 MG/3ML; MG/3ML
1 SOLUTION RESPIRATORY (INHALATION)
Status: DISCONTINUED | OUTPATIENT
Start: 2023-11-05 | End: 2023-11-06 | Stop reason: HOSPADM

## 2023-11-05 RX ORDER — MAGNESIUM SULFATE IN WATER 40 MG/ML
2000 INJECTION, SOLUTION INTRAVENOUS PRN
Status: DISCONTINUED | OUTPATIENT
Start: 2023-11-05 | End: 2023-11-06 | Stop reason: HOSPADM

## 2023-11-05 RX ORDER — POTASSIUM CHLORIDE 20 MEQ/1
40 TABLET, EXTENDED RELEASE ORAL PRN
Status: DISCONTINUED | OUTPATIENT
Start: 2023-11-05 | End: 2023-11-06 | Stop reason: HOSPADM

## 2023-11-05 RX ORDER — ASPIRIN 81 MG/1
81 TABLET ORAL DAILY
Status: DISCONTINUED | OUTPATIENT
Start: 2023-11-06 | End: 2023-11-06 | Stop reason: HOSPADM

## 2023-11-05 RX ORDER — ACETAMINOPHEN 325 MG/1
650 TABLET ORAL EVERY 6 HOURS PRN
Status: DISCONTINUED | OUTPATIENT
Start: 2023-11-05 | End: 2023-11-06 | Stop reason: HOSPADM

## 2023-11-05 RX ORDER — TRAZODONE HYDROCHLORIDE 50 MG/1
50 TABLET ORAL NIGHTLY
Status: DISCONTINUED | OUTPATIENT
Start: 2023-11-05 | End: 2023-11-06 | Stop reason: HOSPADM

## 2023-11-05 RX ORDER — OXYCODONE AND ACETAMINOPHEN 7.5; 325 MG/1; MG/1
1 TABLET ORAL EVERY 6 HOURS PRN
Status: DISCONTINUED | OUTPATIENT
Start: 2023-11-05 | End: 2023-11-06 | Stop reason: HOSPADM

## 2023-11-05 RX ORDER — ONDANSETRON 2 MG/ML
4 INJECTION INTRAMUSCULAR; INTRAVENOUS ONCE
Status: COMPLETED | OUTPATIENT
Start: 2023-11-05 | End: 2023-11-05

## 2023-11-05 RX ORDER — SODIUM CHLORIDE 9 MG/ML
INJECTION, SOLUTION INTRAVENOUS CONTINUOUS
Status: DISCONTINUED | OUTPATIENT
Start: 2023-11-05 | End: 2023-11-06 | Stop reason: HOSPADM

## 2023-11-05 RX ORDER — ONDANSETRON 4 MG/1
4 TABLET, ORALLY DISINTEGRATING ORAL EVERY 8 HOURS PRN
Status: DISCONTINUED | OUTPATIENT
Start: 2023-11-05 | End: 2023-11-06 | Stop reason: HOSPADM

## 2023-11-05 RX ORDER — ATORVASTATIN CALCIUM 40 MG/1
80 TABLET, FILM COATED ORAL NIGHTLY
Status: DISCONTINUED | OUTPATIENT
Start: 2023-11-05 | End: 2023-11-06 | Stop reason: HOSPADM

## 2023-11-05 RX ORDER — ACETAMINOPHEN 650 MG/1
650 SUPPOSITORY RECTAL EVERY 6 HOURS PRN
Status: DISCONTINUED | OUTPATIENT
Start: 2023-11-05 | End: 2023-11-06 | Stop reason: HOSPADM

## 2023-11-05 RX ORDER — 0.9 % SODIUM CHLORIDE 0.9 %
1000 INTRAVENOUS SOLUTION INTRAVENOUS ONCE
Status: COMPLETED | OUTPATIENT
Start: 2023-11-05 | End: 2023-11-05

## 2023-11-05 RX ORDER — SODIUM CHLORIDE 0.9 % (FLUSH) 0.9 %
5-40 SYRINGE (ML) INJECTION EVERY 12 HOURS SCHEDULED
Status: DISCONTINUED | OUTPATIENT
Start: 2023-11-05 | End: 2023-11-06 | Stop reason: HOSPADM

## 2023-11-05 RX ORDER — POLYETHYLENE GLYCOL 3350 17 G/17G
17 POWDER, FOR SOLUTION ORAL DAILY PRN
Status: DISCONTINUED | OUTPATIENT
Start: 2023-11-05 | End: 2023-11-06 | Stop reason: HOSPADM

## 2023-11-05 RX ORDER — POTASSIUM CHLORIDE 7.45 MG/ML
10 INJECTION INTRAVENOUS PRN
Status: DISCONTINUED | OUTPATIENT
Start: 2023-11-05 | End: 2023-11-06 | Stop reason: HOSPADM

## 2023-11-05 RX ORDER — NITROGLYCERIN 0.4 MG/1
0.4 TABLET SUBLINGUAL EVERY 5 MIN PRN
Status: DISCONTINUED | OUTPATIENT
Start: 2023-11-05 | End: 2023-11-06 | Stop reason: HOSPADM

## 2023-11-05 RX ORDER — SODIUM CHLORIDE 0.9 % (FLUSH) 0.9 %
5-40 SYRINGE (ML) INJECTION PRN
Status: DISCONTINUED | OUTPATIENT
Start: 2023-11-05 | End: 2023-11-06 | Stop reason: HOSPADM

## 2023-11-05 RX ADMIN — ONDANSETRON 4 MG: 2 INJECTION INTRAMUSCULAR; INTRAVENOUS at 13:46

## 2023-11-05 RX ADMIN — ONDANSETRON 4 MG: 2 INJECTION INTRAMUSCULAR; INTRAVENOUS at 20:25

## 2023-11-05 RX ADMIN — GABAPENTIN 600 MG: 300 CAPSULE ORAL at 20:25

## 2023-11-05 RX ADMIN — IOPAMIDOL 75 ML: 755 INJECTION, SOLUTION INTRAVENOUS at 14:28

## 2023-11-05 RX ADMIN — PROMETHAZINE HYDROCHLORIDE 25 MG: 25 TABLET ORAL at 16:06

## 2023-11-05 RX ADMIN — IPRATROPIUM BROMIDE AND ALBUTEROL SULFATE 1 DOSE: .5; 3 SOLUTION RESPIRATORY (INHALATION) at 20:56

## 2023-11-05 RX ADMIN — SODIUM CHLORIDE 1000 ML: 9 INJECTION, SOLUTION INTRAVENOUS at 15:12

## 2023-11-05 RX ADMIN — SODIUM CHLORIDE: 9 INJECTION, SOLUTION INTRAVENOUS at 18:10

## 2023-11-05 RX ADMIN — TRAZODONE HYDROCHLORIDE 50 MG: 50 TABLET ORAL at 20:25

## 2023-11-05 RX ADMIN — ATORVASTATIN CALCIUM 80 MG: 40 TABLET, FILM COATED ORAL at 20:25

## 2023-11-05 RX ADMIN — OXYCODONE AND ACETAMINOPHEN 1 TABLET: 7.5; 325 TABLET ORAL at 20:25

## 2023-11-05 ASSESSMENT — PAIN DESCRIPTION - DESCRIPTORS: DESCRIPTORS: ACHING

## 2023-11-05 ASSESSMENT — PAIN DESCRIPTION - ORIENTATION: ORIENTATION: MID

## 2023-11-05 ASSESSMENT — PAIN SCALES - GENERAL
PAINLEVEL_OUTOF10: 7
PAINLEVEL_OUTOF10: 0

## 2023-11-05 ASSESSMENT — PAIN DESCRIPTION - LOCATION: LOCATION: BACK

## 2023-11-05 NOTE — H&P
V2.0  History and Physical      Name:  Isabel Palmer /Age/Sex: 1945  (70 y.o. female)   MRN & CSN:  [id number] & [id number] Encounter Date/Time: 2023 5:28 PM EST   Location:  [facility] PCP: Genny Kent [address] [place] [address] Day: 1    Assessment and Plan:   Isabel Palmer is a 70 y.o. female with a pmh of  who presents with Vomiting    Hospital Problems             Last Modified POA    * (Principal) Vomiting 2023 Yes     Intractable nausea, vomiting   Weakness, generalized   Acute renal failure   COPD  GERD  Osteoporosis  CAD s/p PCI      Plan      Admit inpatient  Telemetry monitering      Nausea, vomiting :     CT abdomen : unremarkable  Give IVF NS  Zofran prn       Elevated troponin : mild elevation , h/o CAD       Trend Troponin  ASA  Statin  Cardiology consult  NPO past midnight  SL nitro prn      ROLY    IVF with NS  Moniter BMP  Avoid NSAIDs  Avoid contrast  Avoid ACEI or ARB  Keep MAP > 65 mmhg        DVT PPX : lovenox    Diet : NPO after midnight    Code status     Full code        Disposition:   Current Living situation: home  Expected Disposition: home  Estimated D/C: 2-3 days    Diet No diet orders on file   DVT Prophylaxis [x] Lovenox, []  Heparin, [] SCDs, [] Ambulation,  [] Eliquis, [] Xarelto, [] Coumadin   Code Status Prior   Surrogate Decision Maker/ POA      Personally reviewed Lab Studies and Imaging     Discussed management of the case with  ED who recommended hospital admission for ORLY , vomiting management    EKG interpreted personally and results : no Acute ST elevation ,  ST - T wave changes in Anterior lead            History from:     Patient  Family     History of Present Illness:     Chief Complaint:   Isabel Palmer is a 70 y.o. female with pmh of COPD , HTN , CAD  Presented with c/o  Nausea, vomiting for last 3 days  Unable to hold food   Not eating or drinking enough fluids since last 3-4 days   Denies diarrhea or fever or GI bleed   Denies chest

## 2023-11-05 NOTE — ED NOTES
[facility] to [facility]. All questions answered. Patient to transport to [facility] via stretcher.      Vinod Ludwig RN  11/05/23 5083

## 2023-11-05 NOTE — ED PROVIDER NOTES
[address]        Pt Name: Edmundo Leroy  MRN: [id number]  [facility] 1945  Date of evaluation: 11/5/2023  Provider: Aisha Romero PA-C  PCP: HECTOR Avilez CNP  Note Started: 1:53 PM EST 11/5/23      KANDICE. I have evaluated this patient. CHIEF COMPLAINT       Chief Complaint   Patient presents with    Urinary Frequency     Pt states that Thursday she was constipated and \"now has a UTI\" pt states that she can't \" keep anything down\"        HISTORY OF PRESENT ILLNESS: 1 or more Elements     History From: patient  Limitations to history : None    Edmundo Leroy is a 70 y.o. female who presents to the emergency department by private vehicle. Patient states since Thursday she has had nausea, vomiting and dry heaving. Patient states is unable to keep down any food or liquid. Denies any associate fever, chills, abdominal pain, chest pain, shortness of breath. No known sick contacts. No one else sick with similar symptoms. Patient's had urinary hesitancy, concern for possible UTI. States she was constipated this week and took laxatives. Patient's since had multiple successful bowel movements. Denies any blood in vomit or stool. No other complaints this time. Nursing Notes were all reviewed and agreed with or any disagreements were addressed in the HPI. REVIEW OF SYSTEMS :      Review of Systems    Positives and Pertinent negatives as per HPI. SURGICAL HISTORY     Past Surgical History:   Procedure Laterality Date    BRONCHOSCOPY N/A 08/03/2022    BRONCHOSCOPY performed by Nathanael Feliciano DO at [address] [place] [address]  07/21/2022    with pci    CARPAL TUNNEL RELEASE Bilateral     CATARACT EXTRACTION      CERVICAL POLYP REMOVAL  09/2004    CORONARY ANGIOPLASTY WITH STENT PLACEMENT  07/19/2022    LCx 99. PCI/stent.     DIAGNOSTIC CARDIAC CATH LAB PROCEDURE Left 07/19/2022    INTRACAPSULAR CATARACT for further evaluation/observation. Armando Xiong made to hospitalist.  Dr. Jasson Hammer kindly admitted. Disposition Considerations (tests considered but not done, Admit vs D/C, Shared Decision Making, Pt Expectation of Test or Tx.): see above       I am the Primary Clinician of Record. FINAL IMPRESSION      1. Abnormal EKG    2. Nausea and vomiting, unspecified vomiting type    3. Proteinuria, unspecified type    4. ROLY (acute kidney injury) [facility])          DISPOSITION/PLAN     DISPOSITION Admitted 11/05/2023 05:15:32 PM      PATIENT REFERRED TO:  No follow-up provider specified.     DISCHARGE MEDICATIONS:  Current Discharge Medication List          DISCONTINUED MEDICATIONS:  Current Discharge Medication List                 (Please note that portions of this note were completed with a voice recognition program.  Efforts were made to edit the dictations but occasionally words are mis-transcribed.)    Celsa Bailey PA-C (electronically signed)          Celsa Bailey PA-C  11/05/23 2018

## 2023-11-05 NOTE — ED NOTES
Patient states she feels like she has a UTI. States she feels weak and worse that when she had a UTI before. Spouse at the bedside.      Vinod Ludwig, LIZETH  11/05/23 9127

## 2023-11-06 VITALS
HEART RATE: 67 BPM | DIASTOLIC BLOOD PRESSURE: 76 MMHG | WEIGHT: 142.42 LBS | BODY MASS INDEX: 24.31 KG/M2 | RESPIRATION RATE: 18 BRPM | OXYGEN SATURATION: 91 % | SYSTOLIC BLOOD PRESSURE: 135 MMHG | TEMPERATURE: 98.3 F | HEIGHT: 64 IN

## 2023-11-06 LAB
ANION GAP SERPL CALCULATED.3IONS-SCNC: 6 MMOL/L (ref 3–16)
BASOPHILS # BLD: 0 K/UL (ref 0–0.2)
BASOPHILS NFR BLD: 1.1 %
BUN SERPL-MCNC: 21 MG/DL (ref 7–20)
CALCIUM SERPL-MCNC: 8.2 MG/DL (ref 8.3–10.6)
CHLORIDE SERPL-SCNC: 107 MMOL/L (ref 99–110)
CO2 SERPL-SCNC: 27 MMOL/L (ref 21–32)
CREAT SERPL-MCNC: 1.2 MG/DL (ref 0.6–1.2)
DEPRECATED RDW RBC AUTO: 14 % (ref 12.4–15.4)
EKG ATRIAL RATE: 79 BPM
EKG DIAGNOSIS: NORMAL
EKG P AXIS: 58 DEGREES
EKG P-R INTERVAL: 144 MS
EKG Q-T INTERVAL: 400 MS
EKG QRS DURATION: 74 MS
EKG QTC CALCULATION (BAZETT): 458 MS
EKG R AXIS: 31 DEGREES
EKG T AXIS: 55 DEGREES
EKG VENTRICULAR RATE: 79 BPM
EOSINOPHIL # BLD: 0.3 K/UL (ref 0–0.6)
EOSINOPHIL NFR BLD: 7.9 %
GFR SERPLBLD CREATININE-BSD FMLA CKD-EPI: 47 ML/MIN/{1.73_M2}
GLUCOSE SERPL-MCNC: 90 MG/DL (ref 70–99)
HCT VFR BLD AUTO: 37.6 % (ref 36–48)
HGB BLD-MCNC: 12.8 G/DL (ref 12–16)
LYMPHOCYTES # BLD: 1.3 K/UL (ref 1–5.1)
LYMPHOCYTES NFR BLD: 34.6 %
MCH RBC QN AUTO: 30.9 PG (ref 26–34)
MCHC RBC AUTO-ENTMCNC: 34.1 G/DL (ref 31–36)
MCV RBC AUTO: 90.6 FL (ref 80–100)
MONOCYTES # BLD: 0.5 K/UL (ref 0–1.3)
MONOCYTES NFR BLD: 12.8 %
NEUTROPHILS # BLD: 1.7 K/UL (ref 1.7–7.7)
NEUTROPHILS NFR BLD: 43.6 %
PLATELET # BLD AUTO: 190 K/UL (ref 135–450)
PMV BLD AUTO: 7.8 FL (ref 5–10.5)
POTASSIUM SERPL-SCNC: 3.7 MMOL/L (ref 3.5–5.1)
RBC # BLD AUTO: 4.15 M/UL (ref 4–5.2)
REASON FOR REJECTION: NORMAL
REJECTED TEST: NORMAL
SODIUM SERPL-SCNC: 140 MMOL/L (ref 136–145)
WBC # BLD AUTO: 3.8 K/UL (ref 4–11)

## 2023-11-06 PROCEDURE — 93010 ELECTROCARDIOGRAM REPORT: CPT | Performed by: INTERNAL MEDICINE

## 2023-11-06 PROCEDURE — 94640 AIRWAY INHALATION TREATMENT: CPT

## 2023-11-06 PROCEDURE — 6370000000 HC RX 637 (ALT 250 FOR IP): Performed by: HOSPITALIST

## 2023-11-06 PROCEDURE — 80048 BASIC METABOLIC PNL TOTAL CA: CPT

## 2023-11-06 PROCEDURE — 2580000003 HC RX 258: Performed by: HOSPITALIST

## 2023-11-06 PROCEDURE — 94760 N-INVAS EAR/PLS OXIMETRY 1: CPT

## 2023-11-06 PROCEDURE — 85025 COMPLETE CBC W/AUTO DIFF WBC: CPT

## 2023-11-06 PROCEDURE — 36415 COLL VENOUS BLD VENIPUNCTURE: CPT

## 2023-11-06 PROCEDURE — 99223 1ST HOSP IP/OBS HIGH 75: CPT | Performed by: INTERNAL MEDICINE

## 2023-11-06 PROCEDURE — 6360000002 HC RX W HCPCS: Performed by: HOSPITALIST

## 2023-11-06 RX ADMIN — SODIUM CHLORIDE: 9 INJECTION, SOLUTION INTRAVENOUS at 04:16

## 2023-11-06 RX ADMIN — IPRATROPIUM BROMIDE AND ALBUTEROL SULFATE 1 DOSE: .5; 3 SOLUTION RESPIRATORY (INHALATION) at 08:36

## 2023-11-06 RX ADMIN — ENOXAPARIN SODIUM 40 MG: 100 INJECTION SUBCUTANEOUS at 09:24

## 2023-11-06 RX ADMIN — ASPIRIN 81 MG: 81 TABLET, COATED ORAL at 09:24

## 2023-11-06 RX ADMIN — OXYCODONE AND ACETAMINOPHEN 1 TABLET: 7.5; 325 TABLET ORAL at 09:48

## 2023-11-06 RX ADMIN — GABAPENTIN 600 MG: 300 CAPSULE ORAL at 09:24

## 2023-11-06 RX ADMIN — IPRATROPIUM BROMIDE AND ALBUTEROL SULFATE 1 DOSE: .5; 3 SOLUTION RESPIRATORY (INHALATION) at 16:12

## 2023-11-06 RX ADMIN — PANTOPRAZOLE SODIUM 40 MG: 40 TABLET, DELAYED RELEASE ORAL at 09:24

## 2023-11-06 RX ADMIN — OXYCODONE AND ACETAMINOPHEN 1 TABLET: 7.5; 325 TABLET ORAL at 17:06

## 2023-11-06 ASSESSMENT — PAIN SCALES - GENERAL
PAINLEVEL_OUTOF10: 6
PAINLEVEL_OUTOF10: 7

## 2023-11-06 NOTE — CONSULTS
[address]  9/23/2769 November 6, 2023    Reason for Consult: Elevated Troponin    CC: Fatigue    HPI:  The patient is 70 y.o. female with a past medical history significant for type 2 DM, CAD and COPD (portable oxygen prn) who presented to [facility] \"thinking I had a UTI\". She states that she has been feeling \"sick for 3 to 4 days\". She has been unable to eat or sleep. She has been nauseous with only dry heaves. There has been no chest pain, tightness or pressure. Today, she feels better. Her mouth is dry. Review of Systems:  Constitutional: No fatigue, weakness, night sweats or fever. HEENT: No new vision difficulties or ringing in the ears. Respiratory: No new SOB, PND, orthopnea or cough. Cardiovascular: See HPI   GI: No n/v, diarrhea, constipation, abdominal pain or changes in bowel habits. No melena, no hematochezia  : No urinary frequency, urgency, incontinence, hematuria or dysuria. Skin: No cyanosis or skin lesions. Musculoskeletal: No new muscle or joint pain. Neurological: No syncope or TIA-like symptoms. Psychiatric: No anxiety, insomnia or depression     Past Medical History:   Diagnosis Date    CAD (coronary artery disease) 07/19/2022    NSTEMI, PCI LCx    Chronic pain syndrome     Percocet.  Dr. 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 This can be addressed as an outpatient as well. She may need the addition of Zetia 10 mg daily or PCSK9 inhibitor/Leqvio. I will sign off for now. Please feel free to call with any concerns or questions. Thank you very much for allowing me to participate in the care of your patient.

## 2023-11-06 NOTE — PROGRESS NOTES
4 Eyes Skin Assessment     NAME:  Clarita Calderon  YOB: 1945  MEDICAL RECORD NUMBER:  [id number]    The patient is being assessed for  Admission    I agree that at least one RN has performed a thorough Head to Toe Skin Assessment on the patient. ALL assessment sites listed below have been assessed. Areas assessed by both nurses:    Head, Face, Ears, Shoulders, Back, Chest, Arms, Elbows, Hands, Sacrum. Buttock, Coccyx, Ischium, Legs. Feet and Heels, Under Medical Devices , and Other x        Does the Patient have a Wound?  No noted wound(s)       Uriel Prevention initiated by RN: No  Wound Care Orders initiated by RN: No    Pressure Injury (Stage 3,4, Unstageable, DTI, NWPT, and Complex wounds) if present, place Wound referral order by RN under : No    New Ostomies, if present place, Ostomy referral order under : No     Nurse 1 eSignature: Electronically signed by Palma Schulz RN on 11/5/23 at 6:24 PM EST    **SHARE this note so that the co-signing nurse can place an eSignature**    Nurse 2 eSignature: Electronically signed by Adolfo Ball RN on 11/5/23 at 6:24 PM EST
Pt admitted to 4280. Vitals obtained. BP elevated. Telemetry box applied and verified. Pt oriented to room and call light. Pt verbalized understanding. No further needs at this time. Call light within reach.
Pt admitted to room 4280. Vitals obtained. AOX4, on room air. Pt oriented to room and call light.
Verbal and written discharge instructions was given to the patient. Patient verbalizes understanding of d/c instructions including medications orders for home and possible side effects associated with them. Pt. instructed and verbalizes understanding to call the doctor listed if they should have any complications or worsening of symptoms and verbalizes understanding about follow-up appointments. D/C'd IV access, patient tolerated well, no signs of bleeding. Pt. Discharged to home with all belongings. Out via wheelchair.
normal...

## 2023-11-06 NOTE — PLAN OF CARE
Problem: Discharge Planning  Goal: Discharge to home or other facility with appropriate resources  11/6/2023 1745 by Suzanne Stanley RN  Outcome: Completed  11/6/2023 1446 by Suzanne Stanley RN  Outcome: Progressing     Problem: Safety - Adult  Goal: Free from fall injury  11/6/2023 1745 by Suzanne Stanley RN  Outcome: Completed  11/6/2023 1446 by Suzanne Stanley RN  Outcome: Progressing     Problem: Respiratory - Adult  Goal: Achieves optimal ventilation and oxygenation  11/6/2023 1745 by Suzanne Stanley RN  Outcome: Completed  11/6/2023 1446 by Suzanne Stanley RN  Outcome: Progressing     Problem: Skin/Tissue Integrity - Adult  Goal: Skin integrity remains intact  11/6/2023 1745 by Suzanne Stanley RN  Outcome: Completed  11/6/2023 1446 by Suzanne Stanley RN  Outcome: Progressing  Goal: Incisions, wounds, or drain sites healing without S/S of infection  11/6/2023 1745 by Suzanne Stanley RN  Outcome: Completed  11/6/2023 1446 by Suzanne Stanley RN  Outcome: Progressing  Goal: Oral mucous membranes remain intact  11/6/2023 1745 by Suzanne Stanley RN  Outcome: Completed  11/6/2023 1446 by Suzanne Stanley RN  Outcome: Progressing     Problem: Musculoskeletal - Adult  Goal: Return mobility to safest level of function  11/6/2023 1745 by Suzanne Stanley RN  Outcome: Completed  11/6/2023 1446 by Suzanne Stanley RN  Outcome: Progressing  Goal: Maintain proper alignment of affected body part  11/6/2023 1745 by Suzanne Stanley RN  Outcome: Completed  11/6/2023 1446 by Suzanne Stanley RN  Outcome: Progressing  Goal: Return ADL status to a safe level of function  11/6/2023 1745 by Suzanne Stanley RN  Outcome: Completed  11/6/2023 1446 by Suzanne Stanley RN  Outcome: Progressing     Problem: Gastrointestinal - Adult  Goal: Minimal or absence of nausea and vomiting  11/6/2023 1745 by Suzanne Stanley RN  Outcome: chronic conditions and co-morbidity symptoms are monitored and maintained or improved  11/6/2023 1745 by Artur Jaquez RN  Outcome: Completed  11/6/2023 1446 by Artur Jaquez RN  Outcome: Progressing

## 2023-11-06 NOTE — CARE COORDINATION
Discharge order acknowledged. Pre chart review, patient to return home with family support, independent with no discharge needs. Spoke to RN, confirmed no anticipated needs.    ULISES Hernandez, LSW, Social Work/Case Management   [phone]  Electronically signed by ULISES Hernandez on 11/6/2023 at 12:19 PM

## 2023-11-06 NOTE — PLAN OF CARE
Problem: Discharge Planning  Goal: Discharge to home or other facility with appropriate resources  Outcome: Progressing     Problem: Safety - Adult  Goal: Free from fall injury  Outcome: Progressing     Problem: Respiratory - Adult  Goal: Achieves optimal ventilation and oxygenation  Outcome: Progressing     Problem: Skin/Tissue Integrity - Adult  Goal: Skin integrity remains intact  Outcome: Progressing  Goal: Incisions, wounds, or drain sites healing without S/S of infection  Outcome: Progressing  Goal: Oral mucous membranes remain intact  Outcome: Progressing     Problem: Musculoskeletal - Adult  Goal: Return mobility to safest level of function  Outcome: Progressing  Goal: Maintain proper alignment of affected body part  Outcome: Progressing  Goal: Return ADL status to a safe level of function  Outcome: Progressing     Problem: Gastrointestinal - Adult  Goal: Minimal or absence of nausea and vomiting  Outcome: Progressing  Goal: Maintains or returns to baseline bowel function  Outcome: Progressing  Goal: Maintains adequate nutritional intake  Outcome: Progressing     Problem: Infection - Adult  Goal: Absence of infection at discharge  Outcome: Progressing  Goal: Absence of infection during hospitalization  Outcome: Progressing  Goal: Absence of fever/infection during anticipated neutropenic period  Outcome: Progressing     Problem: Metabolic/Fluid and Electrolytes - Adult  Goal: Electrolytes maintained within normal limits  Outcome: Progressing  Goal: Hemodynamic stability and optimal renal function maintained  Outcome: Progressing

## 2023-11-06 NOTE — PLAN OF CARE
Problem: Discharge Planning  Goal: Discharge to home or other facility with appropriate resources  Outcome: Progressing     Problem: Safety - Adult  Goal: Free from fall injury  Outcome: Progressing     Problem: Respiratory - Adult  Goal: Achieves optimal ventilation and oxygenation  Outcome: Progressing     Problem: Skin/Tissue Integrity - Adult  Goal: Skin integrity remains intact  Outcome: Progressing  Goal: Incisions, wounds, or drain sites healing without S/S of infection  Outcome: Progressing  Goal: Oral mucous membranes remain intact  Outcome: Progressing     Problem: Musculoskeletal - Adult  Goal: Return mobility to safest level of function  Outcome: Progressing  Goal: Maintain proper alignment of affected body part  Outcome: Progressing  Goal: Return ADL status to a safe level of function  Outcome: Progressing     Problem: Gastrointestinal - Adult  Goal: Minimal or absence of nausea and vomiting  Outcome: Progressing  Goal: Maintains or returns to baseline bowel function  Outcome: Progressing  Goal: Maintains adequate nutritional intake  Outcome: Progressing     Problem: Infection - Adult  Goal: Absence of infection at discharge  Outcome: Progressing  Goal: Absence of infection during hospitalization  Outcome: Progressing  Goal: Absence of fever/infection during anticipated neutropenic period  Outcome: Progressing     Problem: Metabolic/Fluid and Electrolytes - Adult  Goal: Electrolytes maintained within normal limits  Outcome: Progressing  Goal: Hemodynamic stability and optimal renal function maintained  Outcome: Progressing     Problem: Pain  Goal: Verbalizes/displays adequate comfort level or baseline comfort level  Outcome: Progressing     Problem: ABCDS Injury Assessment  Goal: Absence of physical injury  Outcome: Progressing     Problem: Chronic Conditions and Co-morbidities  Goal: Patient's chronic conditions and co-morbidity symptoms are monitored and maintained or improved  Outcome:

## 2023-11-07 ENCOUNTER — CARE COORDINATION (OUTPATIENT)
Dept: CASE MANAGEMENT | Age: 78
End: 2023-11-07

## 2023-11-07 DIAGNOSIS — R53.1 GENERAL WEAKNESS: Primary | ICD-10-CM

## 2023-11-07 PROCEDURE — 1111F DSCHRG MED/CURRENT MED MERGE: CPT

## 2023-11-07 NOTE — CARE COORDINATION
Care Transitions Initial Follow Up Call    Call within 2 business days of discharge: Yes    Patient Current Location:  Home: [address] [place] [address] Transition Nurse contacted the patient by telephone to perform post hospital discharge assessment. Verified name and  with patient as identifiers. Provided introduction to self, and explanation of the Care Transition Nurse role. Patient: Suzan Rich Patient : 1945   MRN: [id number]  Reason for Admission: urinary frequency  Discharge Date: 23 RARS: Readmission Risk Score: 10.5      Last Discharge Facility       Date Complaint Diagnosis Description Type Department Provider    23 Urinary Frequency Abnormal EKG . .. ED to Hosp-Admission (Discharged) (ADMITTED) Magdi Ruiz, Sandra Callahan MD; Ernesto Loza,... Was this an external facility discharge? No Discharge Facility: [facility] to be reviewed by the provider   Additional needs identified to be addressed with provider: No                 Method of communication with provider: none. Initial attempt at CT discharge phone call. Frantz Torres states she is \"doing better - still a little slow. \"  She confirmed her pain management appt on 2023. She states her partner will provide her transportation. Frantz Torres states she does not check her BG level at home. She states she was on home oxygen about 1 year ago but has no baseline need at this time. She states she does have a pulse oximeter at home. She states she has not checked her O2 sat today. She states she has a hand held nebulizer at home and uses this prn. She states she has not used it since she returned home. Frantz Torres states she is urinating - denies any frequency, discoloration, or pain at this time. She states she feels there is a slight odor and slight amount of sediment in her urine. She denies any N/V at this time and states her appetite is improving.  Frantz Torres states her bowels are moving

## 2023-11-08 ENCOUNTER — OFFICE VISIT (OUTPATIENT)
Dept: FAMILY MEDICINE CLINIC | Age: 78
End: 2023-11-08
Payer: MEDICARE

## 2023-11-08 VITALS
OXYGEN SATURATION: 92 % | WEIGHT: 146.6 LBS | BODY MASS INDEX: 25.03 KG/M2 | RESPIRATION RATE: 20 BRPM | SYSTOLIC BLOOD PRESSURE: 128 MMHG | DIASTOLIC BLOOD PRESSURE: 68 MMHG | HEIGHT: 64 IN | TEMPERATURE: 98.4 F | HEART RATE: 97 BPM

## 2023-11-08 DIAGNOSIS — N17.9 AKI (ACUTE KIDNEY INJURY) (HCC): ICD-10-CM

## 2023-11-08 DIAGNOSIS — R11.2 SEVERE NAUSEA AND VOMITING: ICD-10-CM

## 2023-11-08 DIAGNOSIS — Z09 HOSPITAL DISCHARGE FOLLOW-UP: Primary | ICD-10-CM

## 2023-11-08 PROCEDURE — G8419 CALC BMI OUT NRM PARAM NOF/U: HCPCS | Performed by: NURSE PRACTITIONER

## 2023-11-08 PROCEDURE — G8484 FLU IMMUNIZE NO ADMIN: HCPCS | Performed by: NURSE PRACTITIONER

## 2023-11-08 PROCEDURE — 99214 OFFICE O/P EST MOD 30 MIN: CPT | Performed by: NURSE PRACTITIONER

## 2023-11-08 PROCEDURE — 1090F PRES/ABSN URINE INCON ASSESS: CPT | Performed by: NURSE PRACTITIONER

## 2023-11-08 PROCEDURE — G8399 PT W/DXA RESULTS DOCUMENT: HCPCS | Performed by: NURSE PRACTITIONER

## 2023-11-08 PROCEDURE — 1123F ACP DISCUSS/DSCN MKR DOCD: CPT | Performed by: NURSE PRACTITIONER

## 2023-11-08 PROCEDURE — 1111F DSCHRG MED/CURRENT MED MERGE: CPT | Performed by: NURSE PRACTITIONER

## 2023-11-08 PROCEDURE — 1036F TOBACCO NON-USER: CPT | Performed by: NURSE PRACTITIONER

## 2023-11-08 PROCEDURE — G8427 DOCREV CUR MEDS BY ELIG CLIN: HCPCS | Performed by: NURSE PRACTITIONER

## 2023-11-08 RX ORDER — ONDANSETRON 4 MG/1
4 TABLET, FILM COATED ORAL 3 TIMES DAILY PRN
Qty: 15 TABLET | Refills: 0 | Status: SHIPPED | OUTPATIENT
Start: 2023-11-08

## 2023-11-08 NOTE — PROGRESS NOTES
Post-Discharge Transitional Care Follow Up      Ashlee Panchal   YOB: 1945    Date of Office Visit:  11/8/2023  Date of Hospital Admission: 11/5/23  Date of Hospital Discharge: 11/6/23  Readmission Risk Score (high >=14%. Medium >=10%):Readmission Risk Score: 10.5      Care management risk score Rising risk (score 2-5) and Complex Care (Scores >=6): No Risk Score On File     Non face to face  following discharge, date last encounter closed (first attempt may have been earlier): 11/07/2023     Call initiated 2 business days of discharge: Yes     Hospital discharge follow-up  -     RI DISCHARGE MEDS RECONCILED W/ CURRENT OUTPATIENT MED LIST  Severe nausea and vomiting  -     Comprehensive Metabolic Panel; Future  -     CBC with Auto Differential; Future  Symptoms have resolved. Given zofran to keep at home to take PRN  Continue to stay hydrated with water and call if she has recurrent symptoms. ROLY (acute kidney injury) ([address])  -     Comprehensive Metabolic Panel; Future  Due to nausea and vomiting. Renal function improved. Will repeat. Continue to stay hydrated with water. Patient is to call her cardiologist for f/u appt. Return if symptoms worsen or fail to improve. Subjective:   HPI  Chief Complaint   Patient presents with    Follow-Up from [facility] to er 11/5/2023 for nausea and vomiting. Was admitted and discharged 11/6/2023. Doing better but still weak. Patient reports that she has nausea and vomiting for 3 days and urinary frequency. She was not able to tolerate POs and went to ER for evaluation. Hospital coarse:  Patient with nausea, vomiting x3 days. Work-up obtained. CT abdomen pelvis showed no acute process. CBC showed no leukocytosis leukopenia. H&H normal.  CMP showed no significant electrolyte abnormality. Patient does have mild ROLY, creatinine 1.3, up from 1.1 previously. Urine did show proteinuria. No hepatic impairment.   Patient normal.

## 2023-11-09 ENCOUNTER — HOSPITAL ENCOUNTER (OUTPATIENT)
Dept: INFUSION THERAPY | Age: 78
Setting detail: INFUSION SERIES
Discharge: HOME | End: 2023-11-09
Payer: MEDICARE

## 2023-11-09 VITALS
TEMPERATURE: 97.8 F | SYSTOLIC BLOOD PRESSURE: 107 MMHG | WEIGHT: 143 LBS | HEIGHT: 64 IN | DIASTOLIC BLOOD PRESSURE: 68 MMHG | OXYGEN SATURATION: 95 % | BODY MASS INDEX: 24.41 KG/M2 | HEART RATE: 88 BPM | RESPIRATION RATE: 16 BRPM

## 2023-11-09 DIAGNOSIS — M81.0 SENILE OSTEOPOROSIS: Primary | ICD-10-CM

## 2023-11-09 DIAGNOSIS — N17.9 AKI (ACUTE KIDNEY INJURY) (HCC): ICD-10-CM

## 2023-11-09 DIAGNOSIS — R11.2 SEVERE NAUSEA AND VOMITING: ICD-10-CM

## 2023-11-09 LAB
ALBUMIN SERPL-MCNC: 3.8 G/DL (ref 3.4–5)
ALBUMIN/GLOB SERPL: 1.9 {RATIO} (ref 1.1–2.2)
ALP SERPL-CCNC: 55 U/L (ref 40–129)
ALT SERPL-CCNC: 13 U/L (ref 10–40)
ANION GAP SERPL CALCULATED.3IONS-SCNC: 5 MMOL/L (ref 3–16)
AST SERPL-CCNC: 18 U/L (ref 15–37)
BASOPHILS # BLD: 0 K/UL (ref 0–0.2)
BASOPHILS NFR BLD: 0.5 %
BILIRUB SERPL-MCNC: 0.4 MG/DL (ref 0–1)
BUN SERPL-MCNC: 12 MG/DL (ref 7–20)
CALCIUM SERPL-MCNC: 8.9 MG/DL (ref 8.3–10.6)
CHLORIDE SERPL-SCNC: 106 MMOL/L (ref 99–110)
CO2 SERPL-SCNC: 31 MMOL/L (ref 21–32)
CREAT SERPL-MCNC: 1.1 MG/DL (ref 0.6–1.2)
DEPRECATED RDW RBC AUTO: 13.5 % (ref 12.4–15.4)
EOSINOPHIL # BLD: 0.6 K/UL (ref 0–0.6)
EOSINOPHIL NFR BLD: 9.5 %
GFR SERPLBLD CREATININE-BSD FMLA CKD-EPI: 52 ML/MIN/{1.73_M2}
GLUCOSE SERPL-MCNC: 91 MG/DL (ref 70–99)
HCT VFR BLD AUTO: 35.8 % (ref 36–48)
HGB BLD-MCNC: 11.8 G/DL (ref 12–16)
LYMPHOCYTES # BLD: 1.5 K/UL (ref 1–5.1)
LYMPHOCYTES NFR BLD: 23.4 %
MCH RBC QN AUTO: 30.2 PG (ref 26–34)
MCHC RBC AUTO-ENTMCNC: 33.1 G/DL (ref 31–36)
MCV RBC AUTO: 91.5 FL (ref 80–100)
MONOCYTES # BLD: 0.5 K/UL (ref 0–1.3)
MONOCYTES NFR BLD: 7.5 %
NEUTROPHILS # BLD: 3.7 K/UL (ref 1.7–7.7)
NEUTROPHILS NFR BLD: 59.1 %
PLATELET # BLD AUTO: 272 K/UL (ref 135–450)
PMV BLD AUTO: 8.1 FL (ref 5–10.5)
POTASSIUM SERPL-SCNC: 3.9 MMOL/L (ref 3.5–5.1)
PROT SERPL-MCNC: 5.8 G/DL (ref 6.4–8.2)
RBC # BLD AUTO: 3.91 M/UL (ref 4–5.2)
SODIUM SERPL-SCNC: 142 MMOL/L (ref 136–145)
WBC # BLD AUTO: 6.2 K/UL (ref 4–11)

## 2023-11-09 PROCEDURE — 6360000002 HC RX W HCPCS: Performed by: NURSE PRACTITIONER

## 2023-11-09 PROCEDURE — 96372 THER/PROPH/DIAG INJ SC/IM: CPT

## 2023-11-09 RX ORDER — EPINEPHRINE 1 MG/ML
0.3 INJECTION, SOLUTION, CONCENTRATE INTRAVENOUS PRN
Status: CANCELLED | OUTPATIENT
Start: 2024-05-09

## 2023-11-09 RX ORDER — SODIUM CHLORIDE 9 MG/ML
INJECTION, SOLUTION INTRAVENOUS CONTINUOUS
Status: CANCELLED | OUTPATIENT
Start: 2024-05-09

## 2023-11-09 RX ORDER — ONDANSETRON 2 MG/ML
8 INJECTION INTRAMUSCULAR; INTRAVENOUS
Status: CANCELLED | OUTPATIENT
Start: 2024-05-09

## 2023-11-09 RX ORDER — ALBUTEROL SULFATE 90 UG/1
4 AEROSOL, METERED RESPIRATORY (INHALATION) PRN
Status: CANCELLED | OUTPATIENT
Start: 2024-05-09

## 2023-11-09 RX ORDER — ACETAMINOPHEN 325 MG/1
650 TABLET ORAL
Status: CANCELLED | OUTPATIENT
Start: 2024-05-09

## 2023-11-09 RX ORDER — DIPHENHYDRAMINE HYDROCHLORIDE 50 MG/ML
50 INJECTION INTRAMUSCULAR; INTRAVENOUS
Status: CANCELLED | OUTPATIENT
Start: 2024-05-09

## 2023-11-09 RX ADMIN — DENOSUMAB 60 MG: 60 INJECTION SUBCUTANEOUS at 10:29

## 2023-11-09 NOTE — DISCHARGE INSTRUCTIONS
Outpatient Infusion Discharge Instructions  [facility]  [address], [address] [place] [address] Drive  Telephone: [phone]    NAME:  Matt Villasenor OF BIRTH:  1945  MEDICAL RECORD NUMBER:  [id number]  DATE:  @ED@    Reason for Outpatient Infusion Visit: Prolia    If you develop any these symptoms please contact you Doctor    [x] Nausea and/or vomiting not relieved with medication   [x] Swelling, redness, and/or bleeding at injection or IV site    [x] Fever or chills  [x] Rash or itching   [x] Shortness of breath  [x] Please review After Visit Summary (AVS) information on    [] Other Next appointment May 2024      Outpatient [address]: Should you experience any significant changes in your health or have questions about your care please contact the [facility] at [address] [place] [address] 8:00 am - 4:00 pm.  If you need help outside these hours and cannot wait until we are again available, contact your Primary Care Physician or go to the hospital emergency room.        Electronically signed by Dorcas Ya RN on 11/9/2023 at 10:22 AM

## 2023-11-12 ASSESSMENT — ENCOUNTER SYMPTOMS
ABDOMINAL PAIN: 0
NAUSEA: 0
DIARRHEA: 0
COUGH: 0
VOMITING: 0

## 2023-11-14 ENCOUNTER — CARE COORDINATION (OUTPATIENT)
Dept: CARE COORDINATION | Age: 78
End: 2023-11-14

## 2023-11-14 NOTE — CARE COORDINATION
Care Transitions Follow Up Call    Patient Current Location: [address] [place] [address] Transition Nurse contacted the patient by telephone to follow up after admission. Verified name and  with patient as identifiers. Patient: Jose Alonso  Patient : 1945   MRN: [id number]  Reason for Admission: urinary frequency  Discharge Date: 23 RARS: Readmission Risk Score: 10.5      Needs to be reviewed by the provider   Additional needs identified to be addressed with provider: No  none             Method of communication with provider: none. States she's doing okay. She continues to have a sore throat and feel weak, but feels she's generally getting better. She was prescribed zofran for nausea and states this has helped. States she finally has a good appetite and is eating and drinking adequately. Denies vomiting, lightheadedness, dizziness, CP, abdominal pain or diarrhea. She denies any ongoing urinary symptoms. Reports taking all medications as prescribed. Denies questions or concerns at this time. Addressed changes since last contact:  none  Discussed follow-up appointments. If no appointment was previously scheduled, appointment scheduling offered: Yes. Is follow up appointment scheduled within 7 days of discharge? Yes. Follow Up  Future Appointments   Date Time Provider [address]   2023  1:15 PM Nallely Ricardo MD [place] [facility]   2023  1:15 PM [facility] 1 [facility]   2024 11:00 AM [facility] 1 [facility]   2024 11:00 AM Yovani Doss MD [facility] PULM [facility]   2024 11:00 AM Christopher Clayton, APRN - [address] [facility] - DYD     External follow up appointment(s):      Care Transition Nurse reviewed discharge instructions, medical action plan, and red flags with patient and discussed any barriers to care and/or understanding of plan of care after discharge.  Discussed appropriate site of care based on symptoms and resources available

## 2023-11-22 ENCOUNTER — CARE COORDINATION (OUTPATIENT)
Dept: CASE MANAGEMENT | Age: 78
End: 2023-11-22

## 2023-11-22 NOTE — CARE COORDINATION
Care Transitions Follow Up Call      Patient: Geraline Schaumann  Patient : 1945   MRN: [id number]  Reason for Admission: Urinary frequency  Discharge Date: 23 RARS: Readmission Risk Score: 10.5      Needs to be reviewed by the provider   Additional needs identified to be addressed with provider: No               Method of communication with provider: none. Unable to reach patient for CT follow up phone call. Left message. Contact info provided. Requested return call to CTN.       Follow Up  Future Appointments   Date Time Provider [address]   2023  1:15 PM Amparo Chun MD [facility]   2023  1:15 PM [facility] DEXA RM 1 WS [facility] H   2024 11:00 AM WEST CT RM 1 WSTZ [facility] H   2024 11:00 AM Macey Donohue MD [facility] PULM [facility]   2024 11:00 AM Nate Terrazas Transitions Subsequent and Final Call    Subsequent and Final Calls  Care Transitions Interventions                          Other Interventions:              Raafel Villagomez RN BSN  Care Transition Nurse  [phone]

## 2023-11-29 ENCOUNTER — OFFICE VISIT (OUTPATIENT)
Dept: PAIN MANAGEMENT | Age: 78
End: 2023-11-29
Payer: MEDICARE

## 2023-11-29 VITALS
BODY MASS INDEX: 25.06 KG/M2 | WEIGHT: 146 LBS | SYSTOLIC BLOOD PRESSURE: 131 MMHG | HEART RATE: 52 BPM | DIASTOLIC BLOOD PRESSURE: 80 MMHG | OXYGEN SATURATION: 97 %

## 2023-11-29 DIAGNOSIS — R25.2 MUSCLE CRAMPING: ICD-10-CM

## 2023-11-29 DIAGNOSIS — M51.36 DDD (DEGENERATIVE DISC DISEASE), LUMBAR: ICD-10-CM

## 2023-11-29 DIAGNOSIS — Z51.81 ENCOUNTER FOR THERAPEUTIC DRUG MONITORING: ICD-10-CM

## 2023-11-29 DIAGNOSIS — M79.7 FIBROMYALGIA: ICD-10-CM

## 2023-11-29 DIAGNOSIS — G89.4 CHRONIC PAIN SYNDROME: ICD-10-CM

## 2023-11-29 DIAGNOSIS — M47.816 LUMBAR SPONDYLOSIS: ICD-10-CM

## 2023-11-29 DIAGNOSIS — M51.37 DEGENERATION OF LUMBAR OR LUMBOSACRAL INTERVERTEBRAL DISC: ICD-10-CM

## 2023-11-29 DIAGNOSIS — M70.61 TROCHANTERIC BURSITIS OF RIGHT HIP: ICD-10-CM

## 2023-11-29 DIAGNOSIS — M47.26 OSTEOARTHRITIS OF SPINE WITH RADICULOPATHY, LUMBAR REGION: ICD-10-CM

## 2023-11-29 PROCEDURE — G8427 DOCREV CUR MEDS BY ELIG CLIN: HCPCS | Performed by: INTERNAL MEDICINE

## 2023-11-29 PROCEDURE — 1123F ACP DISCUSS/DSCN MKR DOCD: CPT | Performed by: INTERNAL MEDICINE

## 2023-11-29 PROCEDURE — G8419 CALC BMI OUT NRM PARAM NOF/U: HCPCS | Performed by: INTERNAL MEDICINE

## 2023-11-29 PROCEDURE — 99213 OFFICE O/P EST LOW 20 MIN: CPT | Performed by: INTERNAL MEDICINE

## 2023-11-29 PROCEDURE — G8484 FLU IMMUNIZE NO ADMIN: HCPCS | Performed by: INTERNAL MEDICINE

## 2023-11-29 PROCEDURE — 1090F PRES/ABSN URINE INCON ASSESS: CPT | Performed by: INTERNAL MEDICINE

## 2023-11-29 PROCEDURE — G8399 PT W/DXA RESULTS DOCUMENT: HCPCS | Performed by: INTERNAL MEDICINE

## 2023-11-29 PROCEDURE — 1036F TOBACCO NON-USER: CPT | Performed by: INTERNAL MEDICINE

## 2023-11-29 PROCEDURE — 1111F DSCHRG MED/CURRENT MED MERGE: CPT | Performed by: INTERNAL MEDICINE

## 2023-11-29 RX ORDER — GABAPENTIN 600 MG/1
600 TABLET ORAL 2 TIMES DAILY
Qty: 60 TABLET | Refills: 1 | Status: SHIPPED | OUTPATIENT
Start: 2023-11-29 | End: 2024-01-28

## 2023-11-29 RX ORDER — TRAZODONE HYDROCHLORIDE 50 MG/1
50-100 TABLET ORAL NIGHTLY
Qty: 60 TABLET | Refills: 1 | Status: SHIPPED | OUTPATIENT
Start: 2023-11-29

## 2023-11-29 RX ORDER — PANTOPRAZOLE SODIUM 40 MG/1
40 TABLET, DELAYED RELEASE ORAL DAILY
Qty: 30 TABLET | Refills: 1 | Status: SHIPPED | OUTPATIENT
Start: 2023-11-29

## 2023-11-29 RX ORDER — OXYCODONE AND ACETAMINOPHEN 7.5; 325 MG/1; MG/1
1 TABLET ORAL EVERY 6 HOURS PRN
Qty: 120 TABLET | Refills: 0 | Status: SHIPPED | OUTPATIENT
Start: 2023-11-29 | End: 2024-01-03

## 2023-11-29 RX ORDER — MELOXICAM 15 MG/1
TABLET ORAL
Qty: 30 TABLET | Refills: 0 | Status: SHIPPED | OUTPATIENT
Start: 2023-11-29

## 2023-11-29 NOTE — PROGRESS NOTES
Ravi Woodruff  1945  [id number]      HISTORY OF PRESENT ILLNESS:  Ms. Paramjit Huerta is a 70 y.o. female returns for a follow up visit for pain management  She has a diagnosis of   1. Chronic pain syndrome    2. Fibromyalgia    3. Lumbar spondylosis    4. Osteoarthritis of spine with radiculopathy, lumbar region    5. Muscle cramping    6. Degeneration of lumbar or lumbosacral intervertebral disc    7. DDD (degenerative disc disease), lumbar    8. Trochanteric bursitis of right hip    9. Moderate episode of recurrent major depressive disorder ([address])    10. Primary insomnia    11. Gastroesophageal reflux disease without esophagitis    . As per information/history obtained from the PADT(patient assessment and documentation tool) -  She complains of pain in the lower back with radiation to the buttocks She rates the pain 5/10 and describes it as aching. Pain is made worse by: movement, walking, standing, sitting, bending, lifting. She denies any side effects from the current pain regimen. Patient reports that since last follow up visit the physical functioning is unchanged, family/social relationships are unchanged, mood is unchanged sleep patterns are unchanged. Ms. Paramjit Huerta states that since starting the treatment with the current regimen the  overall functioning  in the above aspects is  better, Patient denies misusing/abusing her narcotic pain medications or using any illegal drugs. There are No indicators for possible drug abuse, addiction or diversion problems. Upon obtaining the medical history from Ms. Paramjit Huerta regarding today's office visit for her presenting problems, patient reports she has been doing fair, states she had been sick and had to go to the ER. She mentions she is doing better. She says she is using Percocet 3-4 per day. She denies any constipation symptoms. ALLERGIES: Patients list of allergies were reviewed     MEDICATIONS: Ms. Paramjit Huerta list of medications were reviewed. Her current

## 2023-11-30 ENCOUNTER — CARE COORDINATION (OUTPATIENT)
Dept: CASE MANAGEMENT | Age: 78
End: 2023-11-30

## 2023-11-30 NOTE — CARE COORDINATION
action plan and red flags with patient and discussed any barriers to care and/or understanding of plan of care after discharge. Discussed appropriate site of care based on symptoms and resources available to patient including: PCP  Specialist. The patient agrees to contact the PCP office for questions related to their healthcare. Patients top risk factors for readmission: medical condition-   Interventions to address risk factors: Education of patient/family/caregiver/guardian to support self-management-     Care Transition Nurse provided contact information for future needs. No further follow-up call indicated based on severity of symptoms and risk factors.     Luigi Sales RN  Care Transition Nurse  [phone] mobile

## 2023-12-06 ENCOUNTER — HOSPITAL ENCOUNTER (OUTPATIENT)
Dept: WOMENS IMAGING | Age: 78
Discharge: HOME | End: 2023-12-06
Payer: MEDICARE

## 2023-12-06 DIAGNOSIS — M81.0 AGE-RELATED OSTEOPOROSIS WITHOUT CURRENT PATHOLOGICAL FRACTURE: ICD-10-CM

## 2023-12-06 PROCEDURE — 77080 DXA BONE DENSITY AXIAL: CPT

## 2024-01-02 DIAGNOSIS — M51.37 DEGENERATION OF LUMBAR OR LUMBOSACRAL INTERVERTEBRAL DISC: ICD-10-CM

## 2024-01-02 DIAGNOSIS — M70.61 TROCHANTERIC BURSITIS OF RIGHT HIP: ICD-10-CM

## 2024-01-02 DIAGNOSIS — M51.36 DDD (DEGENERATIVE DISC DISEASE), LUMBAR: ICD-10-CM

## 2024-01-02 DIAGNOSIS — M47.816 LUMBAR SPONDYLOSIS: ICD-10-CM

## 2024-01-02 DIAGNOSIS — M79.7 FIBROMYALGIA: ICD-10-CM

## 2024-01-02 DIAGNOSIS — G89.4 CHRONIC PAIN SYNDROME: ICD-10-CM

## 2024-01-02 DIAGNOSIS — M47.26 OSTEOARTHRITIS OF SPINE WITH RADICULOPATHY, LUMBAR REGION: ICD-10-CM

## 2024-01-02 RX ORDER — OXYCODONE AND ACETAMINOPHEN 7.5; 325 MG/1; MG/1
1 TABLET ORAL EVERY 6 HOURS PRN
Qty: 25 TABLET | Refills: 0 | Status: SHIPPED | OUTPATIENT
Start: 2024-01-02 | End: 2024-01-09 | Stop reason: SDUPTHER

## 2024-01-05 ENCOUNTER — HOSPITAL ENCOUNTER (OUTPATIENT)
Dept: CT IMAGING | Age: 79
Discharge: HOME | End: 2024-01-05
Attending: INTERNAL MEDICINE
Payer: MEDICARE

## 2024-01-05 DIAGNOSIS — R91.1 PULMONARY NODULE SEEN ON IMAGING STUDY: ICD-10-CM

## 2024-01-05 PROCEDURE — 71250 CT THORAX DX C-: CPT

## 2024-01-09 ENCOUNTER — OFFICE VISIT (OUTPATIENT)
Dept: PAIN MANAGEMENT | Age: 79
End: 2024-01-09
Payer: MEDICARE

## 2024-01-09 VITALS
HEART RATE: 82 BPM | OXYGEN SATURATION: 93 % | WEIGHT: 146 LBS | DIASTOLIC BLOOD PRESSURE: 64 MMHG | SYSTOLIC BLOOD PRESSURE: 140 MMHG | BODY MASS INDEX: 25.06 KG/M2

## 2024-01-09 DIAGNOSIS — R25.2 MUSCLE CRAMPING: ICD-10-CM

## 2024-01-09 DIAGNOSIS — K21.9 GASTROESOPHAGEAL REFLUX DISEASE WITHOUT ESOPHAGITIS: ICD-10-CM

## 2024-01-09 DIAGNOSIS — F51.01 PRIMARY INSOMNIA: ICD-10-CM

## 2024-01-09 DIAGNOSIS — G89.4 CHRONIC PAIN SYNDROME: ICD-10-CM

## 2024-01-09 DIAGNOSIS — F33.1 MODERATE EPISODE OF RECURRENT MAJOR DEPRESSIVE DISORDER (HCC): ICD-10-CM

## 2024-01-09 DIAGNOSIS — M51.36 DDD (DEGENERATIVE DISC DISEASE), LUMBAR: ICD-10-CM

## 2024-01-09 DIAGNOSIS — M79.7 FIBROMYALGIA: ICD-10-CM

## 2024-01-09 DIAGNOSIS — M47.816 LUMBAR SPONDYLOSIS: ICD-10-CM

## 2024-01-09 DIAGNOSIS — M70.61 TROCHANTERIC BURSITIS OF RIGHT HIP: ICD-10-CM

## 2024-01-09 DIAGNOSIS — M47.26 OSTEOARTHRITIS OF SPINE WITH RADICULOPATHY, LUMBAR REGION: ICD-10-CM

## 2024-01-09 DIAGNOSIS — M51.37 DEGENERATION OF LUMBAR OR LUMBOSACRAL INTERVERTEBRAL DISC: ICD-10-CM

## 2024-01-09 PROCEDURE — 99214 OFFICE O/P EST MOD 30 MIN: CPT | Performed by: INTERNAL MEDICINE

## 2024-01-09 PROCEDURE — G8419 CALC BMI OUT NRM PARAM NOF/U: HCPCS | Performed by: INTERNAL MEDICINE

## 2024-01-09 PROCEDURE — G8427 DOCREV CUR MEDS BY ELIG CLIN: HCPCS | Performed by: INTERNAL MEDICINE

## 2024-01-09 PROCEDURE — 1036F TOBACCO NON-USER: CPT | Performed by: INTERNAL MEDICINE

## 2024-01-09 PROCEDURE — G8399 PT W/DXA RESULTS DOCUMENT: HCPCS | Performed by: INTERNAL MEDICINE

## 2024-01-09 PROCEDURE — 1123F ACP DISCUSS/DSCN MKR DOCD: CPT | Performed by: INTERNAL MEDICINE

## 2024-01-09 PROCEDURE — 1090F PRES/ABSN URINE INCON ASSESS: CPT | Performed by: INTERNAL MEDICINE

## 2024-01-09 PROCEDURE — G8484 FLU IMMUNIZE NO ADMIN: HCPCS | Performed by: INTERNAL MEDICINE

## 2024-01-09 RX ORDER — MELOXICAM 15 MG/1
TABLET ORAL
Qty: 30 TABLET | Refills: 0 | Status: SHIPPED | OUTPATIENT
Start: 2024-01-09

## 2024-01-09 RX ORDER — OXYCODONE AND ACETAMINOPHEN 7.5; 325 MG/1; MG/1
1 TABLET ORAL EVERY 6 HOURS PRN
Qty: 100 TABLET | Refills: 0 | Status: SHIPPED | OUTPATIENT
Start: 2024-01-09 | End: 2024-02-06

## 2024-01-09 RX ORDER — METHYLPREDNISOLONE 4 MG/1
TABLET ORAL
Qty: 1 KIT | Refills: 0 | Status: SHIPPED | OUTPATIENT
Start: 2024-01-09 | End: 2024-01-15

## 2024-01-09 RX ORDER — PANTOPRAZOLE SODIUM 40 MG/1
40 TABLET, DELAYED RELEASE ORAL DAILY
Qty: 30 TABLET | Refills: 1 | Status: SHIPPED | OUTPATIENT
Start: 2024-01-09

## 2024-01-09 RX ORDER — GABAPENTIN 600 MG/1
600 TABLET ORAL 2 TIMES DAILY
Qty: 60 TABLET | Refills: 1 | Status: SHIPPED | OUTPATIENT
Start: 2024-01-09 | End: 2024-03-09

## 2024-01-09 RX ORDER — TRAZODONE HYDROCHLORIDE 50 MG/1
50-100 TABLET ORAL NIGHTLY
Qty: 60 TABLET | Refills: 1 | Status: SHIPPED | OUTPATIENT
Start: 2024-01-09

## 2024-01-09 NOTE — PROGRESS NOTES
Lalita Pimentel  1945  [id number]    HISTORY OF PRESENT ILLNESS:  Ms. Pimentel is a 78 y.o. female returns for a follow up visit for multiple medical problems.  Her  presenting problems are   1. Chronic pain syndrome    2. DDD (degenerative disc disease), lumbar    3. Fibromyalgia    4. Trochanteric bursitis of right hip    5. Lumbar spondylosis    6. Moderate episode of recurrent major depressive disorder (HCC)    7. Muscle cramping    8. Osteoarthritis of spine with radiculopathy, lumbar region    9. Gastroesophageal reflux disease without esophagitis    10. Degeneration of lumbar or lumbosacral intervertebral disc    11. Primary insomnia    .    As per information/history obtained from the PADT(patient assessment and documentation tool) -  She complains of pain in the lower back and buttocks She rates the pain 5/10 and describes it as sharp, aching.  Pain is made worse by: walking, standing, sitting, bending, lifting.  Current treatment regimen has helped relieve about 50% of the pain.  She denies side effects from the current pain regimen.   Patient reports that since last follow up visit the physical functioning is unchanged, family/social relationships are unchanged, mood is unchanged sleep patterns are unchanged.  Ms. Pimentel states that since starting the treatment with the current regimen the  overall functioning  in the above aspects is  better,Patient denies neurological bowel or bladder. Patient denies misusing/abusing her narcotic pain medications or using any illegal drugs.  There are No indicators for possible drug abuse, addiction or diversion problems. Upon obtaining the medical history from Ms. Pimentel regarding today's office visit for her presenting problems, patient states her pain has moved from the right to the left side of the back, she is having increase pain but not much leg pain. Ms. Pimentel mentions she is using Mobic 3 times a week, she denies any side effects. Patient says she is using

## 2024-01-11 ENCOUNTER — OFFICE VISIT (OUTPATIENT)
Dept: PULMONOLOGY | Age: 79
End: 2024-01-11
Payer: MEDICARE

## 2024-01-11 VITALS
HEART RATE: 72 BPM | SYSTOLIC BLOOD PRESSURE: 130 MMHG | DIASTOLIC BLOOD PRESSURE: 70 MMHG | TEMPERATURE: 97.1 F | BODY MASS INDEX: 24.92 KG/M2 | RESPIRATION RATE: 16 BRPM | OXYGEN SATURATION: 94 % | HEIGHT: 64 IN | WEIGHT: 146 LBS

## 2024-01-11 DIAGNOSIS — R91.1 PULMONARY NODULE SEEN ON IMAGING STUDY: ICD-10-CM

## 2024-01-11 DIAGNOSIS — J44.9 COPD, SEVERE (HCC): Primary | ICD-10-CM

## 2024-01-11 PROBLEM — N18.30 CHRONIC RENAL DISEASE, STAGE III (HCC): Status: ACTIVE | Noted: 2024-01-11

## 2024-01-11 PROCEDURE — 1036F TOBACCO NON-USER: CPT | Performed by: INTERNAL MEDICINE

## 2024-01-11 PROCEDURE — G8484 FLU IMMUNIZE NO ADMIN: HCPCS | Performed by: INTERNAL MEDICINE

## 2024-01-11 PROCEDURE — G8399 PT W/DXA RESULTS DOCUMENT: HCPCS | Performed by: INTERNAL MEDICINE

## 2024-01-11 PROCEDURE — 1123F ACP DISCUSS/DSCN MKR DOCD: CPT | Performed by: INTERNAL MEDICINE

## 2024-01-11 PROCEDURE — G8419 CALC BMI OUT NRM PARAM NOF/U: HCPCS | Performed by: INTERNAL MEDICINE

## 2024-01-11 PROCEDURE — 3023F SPIROM DOC REV: CPT | Performed by: INTERNAL MEDICINE

## 2024-01-11 PROCEDURE — 99214 OFFICE O/P EST MOD 30 MIN: CPT | Performed by: INTERNAL MEDICINE

## 2024-01-11 PROCEDURE — G8427 DOCREV CUR MEDS BY ELIG CLIN: HCPCS | Performed by: INTERNAL MEDICINE

## 2024-01-11 PROCEDURE — 1090F PRES/ABSN URINE INCON ASSESS: CPT | Performed by: INTERNAL MEDICINE

## 2024-01-11 ASSESSMENT — COPD QUESTIONNAIRES
QUESTION7_SLEEPQUALITY: 1
QUESTION3_CHESTTIGHTNESS: 1
QUESTION6_LEAVINGHOUSE: 1
QUESTION1_COUGHFREQUENCY: 2
QUESTION2_CHESTPHLEGM: 2
QUESTION8_ENERGYLEVEL: 2
CAT_TOTALSCORE: 15
QUESTION4_WALKINCLINE: 3
QUESTION5_HOMEACTIVITIES: 3

## 2024-01-11 NOTE — PROGRESS NOTES
REASON FOR CONSULTATION/CC: COPD          PCP: Belkis Silva, APRN - CNP    HISTORY OF PRESENT ILLNESS: Lalita Pimentel is a 78 y.o. year old female with a history of COPD who presents :           COPD Assessment Test (CAT)  Cough Assessment: 2  Phlegm Assessment: 2  Chest tightness: 1  Walking on an incline: 3  Home Activities: 3  Confident Leaving The Home: 1  Sleeping Soundly: 1  Have Energy: 2  Assessment Score: 15            Abnormal radiograph   Resolved.  Reviewed images.     COPD  Breo - no issues.    Using as needed.       Chronic hypoxemia  Has not used oxygen.                Objective:   PHYSICAL EXAM:  Blood pressure 130/70, pulse 72, temperature 97.1 °F (36.2 °C), temperature source Infrared, resp. rate 16, height 1.626 m (5' 4.02\"), weight 66.2 kg (146 lb), SpO2 94 %, not currently breastfeeding.'    Gen: No distress.    Eyes:    ENT:     Neck:    Resp: No accessory muscle use. No crackles. no wheezes, not musical. No rhonchi.    CV: Regular rate. Regular rhythm. No murmur or rub. No edema.   GI:    Skin:    Lymph:    M/S: No cyanosis. No clubbing.     Neuro: Moves all four extremities.   Psych: Oriented x 3. No anxiety.  Awake. Alert. Intact judgement and insight.          Data Reviewed:        Assessment:     COPD, Moderately severe FEV1 59%  Tobacco abuse.   - quit 2022.   Pulmonary nodules, new pulmonary nodule 2022  Right upper lobe Pseudomonas pneumonia with abscess with substantial fibrosis and right upper lobe collapse leading to restriction    Plan:        Problem List Items Addressed This Visit       Pulmonary nodule seen on imaging study     Resolved on CT.  1 year f/u.          COPD, severe (HCC) - Primary      Breo .  Duoneb's  Prn   Controlled.                     This note was transcribed using Dragon Dictation software. Please disregard any translational errors.

## 2024-02-03 DIAGNOSIS — F33.1 MODERATE EPISODE OF RECURRENT MAJOR DEPRESSIVE DISORDER (HCC): ICD-10-CM

## 2024-02-03 DIAGNOSIS — G89.4 CHRONIC PAIN SYNDROME: ICD-10-CM

## 2024-02-05 RX ORDER — DULOXETIN HYDROCHLORIDE 60 MG/1
CAPSULE, DELAYED RELEASE ORAL DAILY
Qty: 90 CAPSULE | Refills: 0 | Status: SHIPPED | OUTPATIENT
Start: 2024-02-05

## 2024-02-06 ENCOUNTER — OFFICE VISIT (OUTPATIENT)
Dept: PAIN MANAGEMENT | Age: 79
End: 2024-02-06
Payer: MEDICARE

## 2024-02-06 VITALS — WEIGHT: 147 LBS | DIASTOLIC BLOOD PRESSURE: 81 MMHG | SYSTOLIC BLOOD PRESSURE: 155 MMHG | BODY MASS INDEX: 25.22 KG/M2

## 2024-02-06 DIAGNOSIS — M79.7 FIBROMYALGIA: ICD-10-CM

## 2024-02-06 DIAGNOSIS — M47.26 OSTEOARTHRITIS OF SPINE WITH RADICULOPATHY, LUMBAR REGION: ICD-10-CM

## 2024-02-06 DIAGNOSIS — R25.2 MUSCLE CRAMPING: ICD-10-CM

## 2024-02-06 DIAGNOSIS — M70.61 TROCHANTERIC BURSITIS OF RIGHT HIP: ICD-10-CM

## 2024-02-06 DIAGNOSIS — M47.816 LUMBAR SPONDYLOSIS: ICD-10-CM

## 2024-02-06 DIAGNOSIS — M51.37 DEGENERATION OF LUMBAR OR LUMBOSACRAL INTERVERTEBRAL DISC: ICD-10-CM

## 2024-02-06 DIAGNOSIS — G89.4 CHRONIC PAIN SYNDROME: ICD-10-CM

## 2024-02-06 DIAGNOSIS — M51.36 DDD (DEGENERATIVE DISC DISEASE), LUMBAR: ICD-10-CM

## 2024-02-06 PROCEDURE — G8419 CALC BMI OUT NRM PARAM NOF/U: HCPCS | Performed by: INTERNAL MEDICINE

## 2024-02-06 PROCEDURE — G8399 PT W/DXA RESULTS DOCUMENT: HCPCS | Performed by: INTERNAL MEDICINE

## 2024-02-06 PROCEDURE — 1036F TOBACCO NON-USER: CPT | Performed by: INTERNAL MEDICINE

## 2024-02-06 PROCEDURE — 1090F PRES/ABSN URINE INCON ASSESS: CPT | Performed by: INTERNAL MEDICINE

## 2024-02-06 PROCEDURE — 99213 OFFICE O/P EST LOW 20 MIN: CPT | Performed by: INTERNAL MEDICINE

## 2024-02-06 PROCEDURE — G8427 DOCREV CUR MEDS BY ELIG CLIN: HCPCS | Performed by: INTERNAL MEDICINE

## 2024-02-06 PROCEDURE — G8484 FLU IMMUNIZE NO ADMIN: HCPCS | Performed by: INTERNAL MEDICINE

## 2024-02-06 PROCEDURE — 1123F ACP DISCUSS/DSCN MKR DOCD: CPT | Performed by: INTERNAL MEDICINE

## 2024-02-06 RX ORDER — TRAZODONE HYDROCHLORIDE 50 MG/1
50-100 TABLET ORAL NIGHTLY
Qty: 60 TABLET | Refills: 1 | Status: SHIPPED | OUTPATIENT
Start: 2024-02-06

## 2024-02-06 RX ORDER — OXYCODONE AND ACETAMINOPHEN 7.5; 325 MG/1; MG/1
1 TABLET ORAL EVERY 6 HOURS PRN
Qty: 120 TABLET | Refills: 0 | Status: SHIPPED | OUTPATIENT
Start: 2024-02-06 | End: 2024-03-12

## 2024-02-06 RX ORDER — GABAPENTIN 600 MG/1
600 TABLET ORAL 2 TIMES DAILY
Qty: 60 TABLET | Refills: 1 | Status: SHIPPED | OUTPATIENT
Start: 2024-02-06 | End: 2024-04-06

## 2024-02-06 RX ORDER — PANTOPRAZOLE SODIUM 40 MG/1
40 TABLET, DELAYED RELEASE ORAL DAILY
Qty: 30 TABLET | Refills: 1 | Status: SHIPPED | OUTPATIENT
Start: 2024-02-06

## 2024-02-06 RX ORDER — MELOXICAM 15 MG/1
TABLET ORAL
Qty: 30 TABLET | Refills: 0 | Status: SHIPPED | OUTPATIENT
Start: 2024-02-06

## 2024-02-06 NOTE — PROGRESS NOTES
Lalita Pimentel  1945  [id number]    HISTORY OF PRESENT ILLNESS:  Ms. Pimentel is a 78 y.o. female returns for a follow up visit for multiple medical problems.  Her  presenting problems are   1. Chronic pain syndrome    2. Trochanteric bursitis of right hip    3. Muscle cramping    4. Degeneration of lumbar or lumbosacral intervertebral disc    5. DDD (degenerative disc disease), lumbar    6. Osteoarthritis of spine with radiculopathy, lumbar region    7. Lumbar spondylosis    8. Primary insomnia    9. Fibromyalgia    10. Moderate episode of recurrent major depressive disorder (HCC)    11. Gastroesophageal reflux disease without esophagitis    .    As per information/history obtained from the PADT(patient assessment and documentation tool) -  She complains of pain in the lower back with radiation to the buttocks and hips Right She rates the pain 5/10 and describes it as aching.  Pain is made worse by: movement, walking, standing, bending.  Current treatment regimen has helped relieve about 50% of the pain.  She denies side effects from the current pain regimen.   Patient reports that since last follow up visit the physical functioning is unchanged, family/social relationships are unchanged, mood is unchanged sleep patterns are unchanged.  Ms. Pimentel states that since starting the treatment with the current regimen the  overall functioning  in the above aspects is  better,Patient denies neurological bowel or bladder. Patient denies misusing/abusing her narcotic pain medications or using any illegal drugs.  There are No indicators for possible drug abuse, addiction or diversion problems.  Upon obtaining the medical history from Ms. Pimentel regarding today's office visit for her presenting problems, Patent reports she is doing fair, has good and bad days. She states she's managing with the medications. She says she's using Percocet 3-4 per day. She mentions she took the medrol dose pack and it helped. She denies any

## 2024-02-14 ENCOUNTER — HOSPITAL ENCOUNTER (INPATIENT)
Age: 79
LOS: 5 days | Discharge: HOME | DRG: 177 | End: 2024-02-19
Attending: HOSPITALIST | Admitting: HOSPITALIST
Payer: MEDICARE

## 2024-02-14 ENCOUNTER — OFFICE VISIT (OUTPATIENT)
Dept: FAMILY MEDICINE CLINIC | Age: 79
End: 2024-02-14
Payer: MEDICARE

## 2024-02-14 ENCOUNTER — APPOINTMENT (OUTPATIENT)
Dept: CT IMAGING | Age: 79
DRG: 177 | End: 2024-02-14
Payer: MEDICARE

## 2024-02-14 ENCOUNTER — APPOINTMENT (OUTPATIENT)
Dept: GENERAL RADIOLOGY | Age: 79
DRG: 177 | End: 2024-02-14
Payer: MEDICARE

## 2024-02-14 VITALS
DIASTOLIC BLOOD PRESSURE: 62 MMHG | RESPIRATION RATE: 22 BRPM | SYSTOLIC BLOOD PRESSURE: 114 MMHG | BODY MASS INDEX: 23.66 KG/M2 | OXYGEN SATURATION: 93 % | WEIGHT: 138.6 LBS | HEIGHT: 64 IN | HEART RATE: 104 BPM | TEMPERATURE: 98.2 F

## 2024-02-14 DIAGNOSIS — J96.01 ACUTE RESPIRATORY FAILURE WITH HYPOXIA (HCC): Primary | ICD-10-CM

## 2024-02-14 DIAGNOSIS — J96.02 ACUTE RESPIRATORY FAILURE WITH HYPOXIA AND HYPERCAPNIA (HCC): Primary | ICD-10-CM

## 2024-02-14 DIAGNOSIS — R79.89 ELEVATED TROPONIN: ICD-10-CM

## 2024-02-14 DIAGNOSIS — J44.1 COPD EXACERBATION (HCC): ICD-10-CM

## 2024-02-14 DIAGNOSIS — J96.01 ACUTE RESPIRATORY FAILURE WITH HYPOXIA AND HYPERCAPNIA (HCC): Primary | ICD-10-CM

## 2024-02-14 LAB
ANION GAP SERPL CALCULATED.3IONS-SCNC: 13 MMOL/L (ref 3–16)
ANISOCYTOSIS BLD QL SMEAR: ABNORMAL
BACTERIA URNS QL MICRO: NORMAL /HPF
BASE EXCESS BLDV CALC-SCNC: 0.1 MMOL/L
BASOPHILS # BLD: 0 K/UL (ref 0–0.2)
BASOPHILS NFR BLD: 0 %
BILIRUB UR QL STRIP.AUTO: NEGATIVE
BUN SERPL-MCNC: 29 MG/DL (ref 7–20)
CALCIUM SERPL-MCNC: 8.6 MG/DL (ref 8.3–10.6)
CHLORIDE SERPL-SCNC: 94 MMOL/L (ref 99–110)
CLARITY UR: CLEAR
CO2 BLDV-SCNC: 29 MMOL/L
CO2 SERPL-SCNC: 26 MMOL/L (ref 21–32)
COHGB MFR BLDV: 1.7 %
COLOR UR: YELLOW
CREAT SERPL-MCNC: 1.4 MG/DL (ref 0.6–1.2)
DEPRECATED RDW RBC AUTO: 15.5 % (ref 12.4–15.4)
EKG ATRIAL RATE: 102 BPM
EKG DIAGNOSIS: NORMAL
EKG P AXIS: 70 DEGREES
EKG P-R INTERVAL: 148 MS
EKG Q-T INTERVAL: 434 MS
EKG QRS DURATION: 74 MS
EKG QTC CALCULATION (BAZETT): 565 MS
EKG R AXIS: 56 DEGREES
EKG T AXIS: 68 DEGREES
EKG VENTRICULAR RATE: 102 BPM
EOSINOPHIL # BLD: 0 K/UL (ref 0–0.6)
EOSINOPHIL NFR BLD: 0 %
EPI CELLS #/AREA URNS AUTO: 2 /HPF (ref 0–5)
FLUAV RNA UPPER RESP QL NAA+PROBE: NEGATIVE
FLUBV AG NPH QL: NEGATIVE
GFR SERPLBLD CREATININE-BSD FMLA CKD-EPI: 39 ML/MIN/{1.73_M2}
GLUCOSE SERPL-MCNC: 181 MG/DL (ref 70–99)
GLUCOSE UR STRIP.AUTO-MCNC: 100 MG/DL
HCO3 BLDV-SCNC: 27 MMOL/L (ref 23–29)
HCT VFR BLD AUTO: 40.3 % (ref 36–48)
HGB BLD-MCNC: 13.8 G/DL (ref 12–16)
HGB UR QL STRIP.AUTO: ABNORMAL
HYALINE CASTS #/AREA URNS AUTO: 0 /LPF (ref 0–8)
KETONES UR STRIP.AUTO-MCNC: NEGATIVE MG/DL
LACTATE BLDV-SCNC: 1.4 MMOL/L (ref 0.4–1.9)
LEUKOCYTE ESTERASE UR QL STRIP.AUTO: NEGATIVE
LYMPHOCYTES # BLD: 0.7 K/UL (ref 1–5.1)
LYMPHOCYTES NFR BLD: 6 %
MACROCYTES BLD QL SMEAR: ABNORMAL
MCH RBC QN AUTO: 31 PG (ref 26–34)
MCHC RBC AUTO-ENTMCNC: 34.2 G/DL (ref 31–36)
MCV RBC AUTO: 90.7 FL (ref 80–100)
METAMYELOCYTES NFR BLD MANUAL: 1 %
METHGB MFR BLDV: 0.2 %
MONOCYTES # BLD: 1.3 K/UL (ref 0–1.3)
MONOCYTES NFR BLD: 11 %
NEUTROPHILS # BLD: 10 K/UL (ref 1.7–7.7)
NEUTROPHILS NFR BLD: 69 %
NEUTS BAND NFR BLD MANUAL: 13 % (ref 0–7)
NITRITE UR QL STRIP.AUTO: NEGATIVE
NRBC BLD-RTO: 1 /100 WBC
NT-PROBNP SERPL-MCNC: 880 PG/ML (ref 0–449)
O2 THERAPY: ABNORMAL
PCO2 BLDV: 51.8 MMHG (ref 40–50)
PH BLDV: 7.33 [PH] (ref 7.35–7.45)
PH UR STRIP.AUTO: 5.5 [PH] (ref 5–8)
PLATELET # BLD AUTO: 346 K/UL (ref 135–450)
PMV BLD AUTO: 8 FL (ref 5–10.5)
PO2 BLDV: 44 MMHG
POTASSIUM SERPL-SCNC: 3.8 MMOL/L (ref 3.5–5.1)
PROT UR STRIP.AUTO-MCNC: 30 MG/DL
RBC # BLD AUTO: 4.44 M/UL (ref 4–5.2)
RBC CLUMPS #/AREA URNS AUTO: 1 /HPF (ref 0–4)
SAO2 % BLDV: 76 %
SARS-COV-2 RDRP RESP QL NAA+PROBE: NOT DETECTED
SODIUM SERPL-SCNC: 133 MMOL/L (ref 136–145)
SP GR UR STRIP.AUTO: 1.03 (ref 1–1.03)
TROPONIN, HIGH SENSITIVITY: 15 NG/L (ref 0–14)
TROPONIN, HIGH SENSITIVITY: 20 NG/L (ref 0–14)
UA DIPSTICK W REFLEX MICRO PNL UR: YES
URN SPEC COLLECT METH UR: ABNORMAL
UROBILINOGEN UR STRIP-ACNC: 1 E.U./DL
WBC # BLD AUTO: 12 K/UL (ref 4–11)
WBC #/AREA URNS AUTO: 1 /HPF (ref 0–5)

## 2024-02-14 PROCEDURE — 71045 X-RAY EXAM CHEST 1 VIEW: CPT

## 2024-02-14 PROCEDURE — 87635 SARS-COV-2 COVID-19 AMP PRB: CPT

## 2024-02-14 PROCEDURE — 6360000002 HC RX W HCPCS: Performed by: HOSPITALIST

## 2024-02-14 PROCEDURE — 71260 CT THORAX DX C+: CPT

## 2024-02-14 PROCEDURE — 6370000000 HC RX 637 (ALT 250 FOR IP): Performed by: PHYSICIAN ASSISTANT

## 2024-02-14 PROCEDURE — 87077 CULTURE AEROBIC IDENTIFY: CPT

## 2024-02-14 PROCEDURE — 81001 URINALYSIS AUTO W/SCOPE: CPT

## 2024-02-14 PROCEDURE — 1200000000 HC SEMI PRIVATE

## 2024-02-14 PROCEDURE — 83880 ASSAY OF NATRIURETIC PEPTIDE: CPT

## 2024-02-14 PROCEDURE — 87185 SC STD ENZYME DETCJ PER NZM: CPT

## 2024-02-14 PROCEDURE — 94761 N-INVAS EAR/PLS OXIMETRY MLT: CPT

## 2024-02-14 PROCEDURE — 84484 ASSAY OF TROPONIN QUANT: CPT

## 2024-02-14 PROCEDURE — G8427 DOCREV CUR MEDS BY ELIG CLIN: HCPCS | Performed by: NURSE PRACTITIONER

## 2024-02-14 PROCEDURE — 93010 ELECTROCARDIOGRAM REPORT: CPT | Performed by: INTERNAL MEDICINE

## 2024-02-14 PROCEDURE — 80048 BASIC METABOLIC PNL TOTAL CA: CPT

## 2024-02-14 PROCEDURE — G8420 CALC BMI NORM PARAMETERS: HCPCS | Performed by: NURSE PRACTITIONER

## 2024-02-14 PROCEDURE — 87205 SMEAR GRAM STAIN: CPT

## 2024-02-14 PROCEDURE — 2500000003 HC RX 250 WO HCPCS: Performed by: HOSPITALIST

## 2024-02-14 PROCEDURE — 85025 COMPLETE CBC W/AUTO DIFF WBC: CPT

## 2024-02-14 PROCEDURE — 87070 CULTURE OTHR SPECIMN AEROBIC: CPT

## 2024-02-14 PROCEDURE — 93005 ELECTROCARDIOGRAM TRACING: CPT | Performed by: PHYSICIAN ASSISTANT

## 2024-02-14 PROCEDURE — 6360000002 HC RX W HCPCS: Performed by: PHYSICIAN ASSISTANT

## 2024-02-14 PROCEDURE — G8484 FLU IMMUNIZE NO ADMIN: HCPCS | Performed by: NURSE PRACTITIONER

## 2024-02-14 PROCEDURE — 1090F PRES/ABSN URINE INCON ASSESS: CPT | Performed by: NURSE PRACTITIONER

## 2024-02-14 PROCEDURE — 87804 INFLUENZA ASSAY W/OPTIC: CPT

## 2024-02-14 PROCEDURE — 6370000000 HC RX 637 (ALT 250 FOR IP): Performed by: HOSPITALIST

## 2024-02-14 PROCEDURE — G8399 PT W/DXA RESULTS DOCUMENT: HCPCS | Performed by: NURSE PRACTITIONER

## 2024-02-14 PROCEDURE — 82803 BLOOD GASES ANY COMBINATION: CPT

## 2024-02-14 PROCEDURE — 2580000003 HC RX 258: Performed by: HOSPITALIST

## 2024-02-14 PROCEDURE — 99213 OFFICE O/P EST LOW 20 MIN: CPT | Performed by: NURSE PRACTITIONER

## 2024-02-14 PROCEDURE — 36415 COLL VENOUS BLD VENIPUNCTURE: CPT

## 2024-02-14 PROCEDURE — 96374 THER/PROPH/DIAG INJ IV PUSH: CPT

## 2024-02-14 PROCEDURE — 6360000004 HC RX CONTRAST MEDICATION: Performed by: PHYSICIAN ASSISTANT

## 2024-02-14 PROCEDURE — 94640 AIRWAY INHALATION TREATMENT: CPT

## 2024-02-14 PROCEDURE — 83605 ASSAY OF LACTIC ACID: CPT

## 2024-02-14 PROCEDURE — 1036F TOBACCO NON-USER: CPT | Performed by: NURSE PRACTITIONER

## 2024-02-14 PROCEDURE — 1123F ACP DISCUSS/DSCN MKR DOCD: CPT | Performed by: NURSE PRACTITIONER

## 2024-02-14 PROCEDURE — 99285 EMERGENCY DEPT VISIT HI MDM: CPT

## 2024-02-14 PROCEDURE — 2700000000 HC OXYGEN THERAPY PER DAY

## 2024-02-14 RX ORDER — POLYETHYLENE GLYCOL 3350 17 G/17G
17 POWDER, FOR SOLUTION ORAL DAILY PRN
Status: DISCONTINUED | OUTPATIENT
Start: 2024-02-14 | End: 2024-02-19 | Stop reason: HOSPADM

## 2024-02-14 RX ORDER — ONDANSETRON 2 MG/ML
4 INJECTION INTRAMUSCULAR; INTRAVENOUS EVERY 6 HOURS PRN
Status: DISCONTINUED | OUTPATIENT
Start: 2024-02-14 | End: 2024-02-19 | Stop reason: HOSPADM

## 2024-02-14 RX ORDER — SODIUM CHLORIDE 0.9 % (FLUSH) 0.9 %
5-40 SYRINGE (ML) INJECTION PRN
Status: DISCONTINUED | OUTPATIENT
Start: 2024-02-14 | End: 2024-02-19 | Stop reason: HOSPADM

## 2024-02-14 RX ORDER — VITAMIN B COMPLEX
2000 TABLET ORAL DAILY
Status: DISCONTINUED | OUTPATIENT
Start: 2024-02-15 | End: 2024-02-19 | Stop reason: HOSPADM

## 2024-02-14 RX ORDER — ACETAMINOPHEN 650 MG/1
650 SUPPOSITORY RECTAL EVERY 6 HOURS PRN
Status: DISCONTINUED | OUTPATIENT
Start: 2024-02-14 | End: 2024-02-19 | Stop reason: HOSPADM

## 2024-02-14 RX ORDER — GABAPENTIN 300 MG/1
600 CAPSULE ORAL 2 TIMES DAILY
Status: DISCONTINUED | OUTPATIENT
Start: 2024-02-15 | End: 2024-02-19 | Stop reason: HOSPADM

## 2024-02-14 RX ORDER — POTASSIUM CHLORIDE 7.45 MG/ML
10 INJECTION INTRAVENOUS PRN
Status: DISCONTINUED | OUTPATIENT
Start: 2024-02-14 | End: 2024-02-19 | Stop reason: HOSPADM

## 2024-02-14 RX ORDER — ONDANSETRON 4 MG/1
4 TABLET, ORALLY DISINTEGRATING ORAL EVERY 8 HOURS PRN
Status: DISCONTINUED | OUTPATIENT
Start: 2024-02-14 | End: 2024-02-19 | Stop reason: HOSPADM

## 2024-02-14 RX ORDER — SODIUM CHLORIDE 0.9 % (FLUSH) 0.9 %
5-40 SYRINGE (ML) INJECTION EVERY 12 HOURS SCHEDULED
Status: DISCONTINUED | OUTPATIENT
Start: 2024-02-14 | End: 2024-02-19 | Stop reason: HOSPADM

## 2024-02-14 RX ORDER — IPRATROPIUM BROMIDE AND ALBUTEROL SULFATE 2.5; .5 MG/3ML; MG/3ML
3 SOLUTION RESPIRATORY (INHALATION) ONCE
Status: COMPLETED | OUTPATIENT
Start: 2024-02-14 | End: 2024-02-14

## 2024-02-14 RX ORDER — CETIRIZINE HYDROCHLORIDE 10 MG/1
5 TABLET ORAL DAILY PRN
Status: DISCONTINUED | OUTPATIENT
Start: 2024-02-14 | End: 2024-02-19 | Stop reason: HOSPADM

## 2024-02-14 RX ORDER — ACETAMINOPHEN 325 MG/1
650 TABLET ORAL EVERY 6 HOURS PRN
Status: DISCONTINUED | OUTPATIENT
Start: 2024-02-14 | End: 2024-02-19 | Stop reason: HOSPADM

## 2024-02-14 RX ORDER — ENOXAPARIN SODIUM 100 MG/ML
40 INJECTION SUBCUTANEOUS DAILY
Status: DISCONTINUED | OUTPATIENT
Start: 2024-02-14 | End: 2024-02-19 | Stop reason: HOSPADM

## 2024-02-14 RX ORDER — IPRATROPIUM BROMIDE AND ALBUTEROL SULFATE 2.5; .5 MG/3ML; MG/3ML
1 SOLUTION RESPIRATORY (INHALATION)
Status: DISCONTINUED | OUTPATIENT
Start: 2024-02-14 | End: 2024-02-14

## 2024-02-14 RX ORDER — ALBUTEROL SULFATE 2.5 MG/3ML
2.5 SOLUTION RESPIRATORY (INHALATION)
Status: DISCONTINUED | OUTPATIENT
Start: 2024-02-14 | End: 2024-02-14

## 2024-02-14 RX ORDER — METHYLPREDNISOLONE SODIUM SUCCINATE 125 MG/2ML
125 INJECTION, POWDER, LYOPHILIZED, FOR SOLUTION INTRAMUSCULAR; INTRAVENOUS ONCE
Status: COMPLETED | OUTPATIENT
Start: 2024-02-14 | End: 2024-02-14

## 2024-02-14 RX ORDER — MAGNESIUM SULFATE IN WATER 40 MG/ML
2000 INJECTION, SOLUTION INTRAVENOUS PRN
Status: DISCONTINUED | OUTPATIENT
Start: 2024-02-14 | End: 2024-02-19 | Stop reason: HOSPADM

## 2024-02-14 RX ORDER — IPRATROPIUM BROMIDE AND ALBUTEROL SULFATE 2.5; .5 MG/3ML; MG/3ML
1 SOLUTION RESPIRATORY (INHALATION)
Status: DISCONTINUED | OUTPATIENT
Start: 2024-02-15 | End: 2024-02-19 | Stop reason: HOSPADM

## 2024-02-14 RX ORDER — TRAZODONE HYDROCHLORIDE 50 MG/1
50 TABLET ORAL NIGHTLY
Status: DISCONTINUED | OUTPATIENT
Start: 2024-02-14 | End: 2024-02-19 | Stop reason: HOSPADM

## 2024-02-14 RX ORDER — ASPIRIN 81 MG/1
81 TABLET ORAL DAILY
Status: DISCONTINUED | OUTPATIENT
Start: 2024-02-15 | End: 2024-02-19 | Stop reason: HOSPADM

## 2024-02-14 RX ORDER — OXYCODONE AND ACETAMINOPHEN 7.5; 325 MG/1; MG/1
1 TABLET ORAL EVERY 6 HOURS PRN
Status: DISCONTINUED | OUTPATIENT
Start: 2024-02-14 | End: 2024-02-19 | Stop reason: HOSPADM

## 2024-02-14 RX ORDER — SODIUM CHLORIDE 9 MG/ML
INJECTION, SOLUTION INTRAVENOUS PRN
Status: DISCONTINUED | OUTPATIENT
Start: 2024-02-14 | End: 2024-02-19 | Stop reason: HOSPADM

## 2024-02-14 RX ORDER — METHYLPREDNISOLONE SODIUM SUCCINATE 40 MG/ML
40 INJECTION, POWDER, LYOPHILIZED, FOR SOLUTION INTRAMUSCULAR; INTRAVENOUS EVERY 12 HOURS
Status: DISCONTINUED | OUTPATIENT
Start: 2024-02-15 | End: 2024-02-16

## 2024-02-14 RX ORDER — FLUTICASONE FUROATE AND VILANTEROL 200; 25 UG/1; UG/1
1 POWDER RESPIRATORY (INHALATION) DAILY
Status: DISCONTINUED | OUTPATIENT
Start: 2024-02-14 | End: 2024-02-14

## 2024-02-14 RX ORDER — DULOXETIN HYDROCHLORIDE 60 MG/1
60 CAPSULE, DELAYED RELEASE ORAL DAILY
Status: DISCONTINUED | OUTPATIENT
Start: 2024-02-15 | End: 2024-02-19 | Stop reason: HOSPADM

## 2024-02-14 RX ORDER — ATORVASTATIN CALCIUM 80 MG/1
80 TABLET, FILM COATED ORAL NIGHTLY
Status: DISCONTINUED | OUTPATIENT
Start: 2024-02-14 | End: 2024-02-19 | Stop reason: HOSPADM

## 2024-02-14 RX ORDER — POTASSIUM CHLORIDE 20 MEQ/1
40 TABLET, EXTENDED RELEASE ORAL PRN
Status: DISCONTINUED | OUTPATIENT
Start: 2024-02-14 | End: 2024-02-19 | Stop reason: HOSPADM

## 2024-02-14 RX ORDER — PANTOPRAZOLE SODIUM 40 MG/1
40 TABLET, DELAYED RELEASE ORAL
Status: DISCONTINUED | OUTPATIENT
Start: 2024-02-15 | End: 2024-02-19 | Stop reason: HOSPADM

## 2024-02-14 RX ADMIN — SODIUM CHLORIDE, PRESERVATIVE FREE 10 ML: 5 INJECTION INTRAVENOUS at 20:08

## 2024-02-14 RX ADMIN — IOPAMIDOL 75 ML: 755 INJECTION, SOLUTION INTRAVENOUS at 14:38

## 2024-02-14 RX ADMIN — IPRATROPIUM BROMIDE AND ALBUTEROL SULFATE 3 DOSE: 2.5; .5 SOLUTION RESPIRATORY (INHALATION) at 13:26

## 2024-02-14 RX ADMIN — OXYCODONE AND ACETAMINOPHEN 1 TABLET: 7.5; 325 TABLET ORAL at 22:04

## 2024-02-14 RX ADMIN — ATORVASTATIN CALCIUM 80 MG: 80 TABLET, FILM COATED ORAL at 20:05

## 2024-02-14 RX ADMIN — DOXYCYCLINE 100 MG: 100 INJECTION, POWDER, LYOPHILIZED, FOR SOLUTION INTRAVENOUS at 20:06

## 2024-02-14 RX ADMIN — ALBUTEROL SULFATE 2.5 MG: 2.5 SOLUTION RESPIRATORY (INHALATION) at 20:05

## 2024-02-14 RX ADMIN — MOMETASONE FUROATE AND FORMOTEROL FUMARATE DIHYDRATE 2 PUFF: 200; 5 AEROSOL RESPIRATORY (INHALATION) at 20:06

## 2024-02-14 RX ADMIN — METHYLPREDNISOLONE SODIUM SUCCINATE 125 MG: 125 INJECTION INTRAMUSCULAR; INTRAVENOUS at 13:21

## 2024-02-14 RX ADMIN — ENOXAPARIN SODIUM 40 MG: 100 INJECTION SUBCUTANEOUS at 20:07

## 2024-02-14 ASSESSMENT — PAIN SCALES - GENERAL
PAINLEVEL_OUTOF10: 7
PAINLEVEL_OUTOF10: 0

## 2024-02-14 ASSESSMENT — PAIN DESCRIPTION - DESCRIPTORS: DESCRIPTORS: CRAMPING

## 2024-02-14 ASSESSMENT — PAIN - FUNCTIONAL ASSESSMENT
PAIN_FUNCTIONAL_ASSESSMENT: 0-10
PAIN_FUNCTIONAL_ASSESSMENT: NONE - DENIES PAIN

## 2024-02-14 ASSESSMENT — PAIN DESCRIPTION - LOCATION: LOCATION: BACK;FOOT

## 2024-02-14 NOTE — ED PROVIDER NOTES
Substance Use Topics    Alcohol use: No     Alcohol/week: 0.0 standard drinks of alcohol    Drug use: No       SCREENINGS    Kai Coma Scale  Eye Opening: Spontaneous  Best Verbal Response: Oriented  Best Motor Response: Obeys commands  Kai Coma Scale Score: 15      PHYSICAL EXAM    (up to 7 for level 4, 8 or more for level 5)     ED Triage Vitals   BP Temp Temp src Pulse Respirations SpO2 Height Weight - Scale   02/14/24 1219 02/14/24 1221 -- 02/14/24 1219 02/14/24 1219 02/14/24 1219 -- 02/14/24 1219   106/62 98 °F (36.7 °C)  (!) 104 19 (!) 89 %  62.8 kg (138 lb 7.2 oz)       Physical Exam  Vitals and nursing note reviewed.   Constitutional:       General: She is not in acute distress.     Appearance: Normal appearance. She is not ill-appearing.   HENT:      Head: Normocephalic and atraumatic.      Nose: Nose normal.   Eyes:      General:         Right eye: No discharge.         Left eye: No discharge.   Cardiovascular:      Rate and Rhythm: Normal rate and regular rhythm.      Heart sounds: Normal heart sounds.   Pulmonary:      Effort: Pulmonary effort is normal. Tachypnea present. No respiratory distress.      Breath sounds: No stridor. Wheezing (mild, diffuse) present. No rhonchi or rales.   Musculoskeletal:         General: Normal range of motion.      Cervical back: Normal range of motion.   Skin:     General: Skin is warm and dry.   Neurological:      General: No focal deficit present.      Mental Status: She is alert and oriented to person, place, and time.   Psychiatric:         Mood and Affect: Mood normal.         Behavior: Behavior normal.         DIAGNOSTIC RESULTS   LABS:    Labs Reviewed   CBC WITH AUTO DIFFERENTIAL - Abnormal; Notable for the following components:       Result Value    WBC 12.0 (*)     RDW 15.5 (*)     Neutrophils Absolute 10.0 (*)     Lymphocytes Absolute 0.7 (*)     Bands Relative 13 (*)     Metamyelocytes Relative 1 (*)     nRBC 1 (*)     Anisocytosis Occasional (*)

## 2024-02-14 NOTE — PROGRESS NOTES
2/14/2024    This is a 78 y.o. female   Chief Complaint   Patient presents with    Shortness of Breath     Started on Saturday.  Cough. Sob. Slight fever. Extreme fatigue.  Confusion. Had fall within the last week.     .    HPI  Patient reports that this past Sat 2/10 started with increased cough and shortness of breath. Sputum production is yellow and green. Feels that she had a slight fever but does not remember the readings.   Has history of severe COPD. No longer has oxygen to wear at home. Follows with Dr. Garza.   Has not been using nebulizer at home.      Patient Active Problem List   Diagnosis    Osteopenia-last dexa 2009 repeat 2015 (-2.4 hip)--advised tx    Colon polyp, hyperplastic,-(done 3/11-repeat 3-5 yrs--dr ulloa)    Family history of colon cancer    CTS (carpal tunnel syndrome)BILATERAL-s/p surgical repair--resolved     Postmenopausal status-last mammogram 2/15 wnl last pap 12/13--wnl    Nondependent alcohol abuse, in remission    Urticaria, chronic    Smoking    Chronic back pain-(djd) saw dr oleary--cannot afford surgery--seeing dr aguirre for pain meds    Fibromyalgia    Hypercholesteremia    Lumbar spondylosis    Vitamin D deficiency--severe--started 50,000 iu 2x/ wk 11/13    Insomnia--on elevil w help    Constipation--on daily miralax    Depression    Chronic pain syndrome    Muscle cramping    Acute on chronic respiratory failure with hypercapnia (HCC)    Severe sepsis (HCC)    Gastroesophageal reflux disease    COPD, severe (HCC)    Chronic hypoxemic respiratory failure (HCC)    Degeneration of lumbar or lumbosacral intervertebral disc    Trochanteric bursitis of right hip    Diabetes (HCC)    Controlled type 2 diabetes mellitus without complication, without long-term current use of insulin (HCC)    Age-related osteoporosis without current pathological fracture- started alendronate 9/2021    Pulmonary nodule seen on imaging study    General weakness    Elevated lactic acid level

## 2024-02-14 NOTE — ACP (ADVANCE CARE PLANNING)
to Provider for additional questions/concerns   [] Advised patient/agent/surrogate to review completed ACP document and update if needed with changes in condition, patient preferences or care setting    [x] This note routed to one or more involved healthcare providers Belkis Silva, APRN - CNP primary care physician.     Respectfully submitted,    Lauren MONTGOMERY, RENALDO  [facility]   [phone]    Electronically signed by ULISES Argueta, PABLITO on 2/14/2024 at 6:00 PM

## 2024-02-14 NOTE — H&P
V2.0  History and Physical      Name:  Lalita Pimentel /Age/Sex: 1945  (78 y.o. female)   MRN & CSN:  [id number] & [id number] Encounter Date/Time: 2024 5:09 PM EST   Location:   PCP: Belkis Silva APRN - CNP       Hospital Day: 1    Assessment and Plan:   Lalita Pimentel is a 78 y.o. female with a pmh of  who presents with COPD exacerbation (HCC)    Hospital Problems             Last Modified POA    * (Principal) COPD exacerbation (HCC) 2024 Yes       COPD exacerbation  Acute renal failure   Hyponatremia  Leucocytosis  CAD s/p PCI  GERD    Plan    Admit Inpatient  Telemetry monitering  Oxygen via NC to keep O2 sat > 92 %  Duonebs every 4 hr  IV solumedrol q12h  IV antibiotics : doxycycline   Sputum cx  Moniter O2 sat  Pulmonary consulted     Continue home meds for chronic conditions    CAD : cont ASA, statin  GERD : cont PPI    DVT ppx : lovenox    Code status : full code    Diet    Cardiac diet     Disposition:   Current Living situation: home  Expected Disposition: home  Estimated D/C: 2-3 days    Diet No diet orders on file   DVT Prophylaxis [] Lovenox, []  Heparin, [] SCDs, [] Ambulation,  [] Eliquis, [] Xarelto, [] Coumadin   Code Status Prior   Surrogate Decision Maker/ POA      Personally reviewed Lab Studies and Imaging     Discussed management of the case with ED  who recommended hospital admission     EKG interpreted personally and results     Imaging that was interpreted personally includes CXR  and results no PNA    Drugs that require monitoring for toxicity include IV solumedrol  , IV doxycycline and the method of monitoring was   Telemetry monitering       History from:     Patient  Family     History of Present Illness:     Chief Complaint:   Lalita Pimentel is a 78 y.o. female Past medical history COPD, GERD, osteoporosis, CAD status post PCI     Presented with c/o SOB   She was sent to ED from PCP office where she was found to have her O2 sat 83 % on RA  C/o

## 2024-02-15 LAB
ANION GAP SERPL CALCULATED.3IONS-SCNC: 12 MMOL/L (ref 3–16)
BASOPHILS # BLD: 0 K/UL (ref 0–0.2)
BASOPHILS NFR BLD: 0.1 %
BUN SERPL-MCNC: 31 MG/DL (ref 7–20)
CALCIUM SERPL-MCNC: 8.5 MG/DL (ref 8.3–10.6)
CHLORIDE SERPL-SCNC: 98 MMOL/L (ref 99–110)
CO2 SERPL-SCNC: 26 MMOL/L (ref 21–32)
CREAT SERPL-MCNC: 1.2 MG/DL (ref 0.6–1.2)
DEPRECATED RDW RBC AUTO: 15.4 % (ref 12.4–15.4)
EOSINOPHIL # BLD: 0 K/UL (ref 0–0.6)
EOSINOPHIL NFR BLD: 0.1 %
GFR SERPLBLD CREATININE-BSD FMLA CKD-EPI: 47 ML/MIN/{1.73_M2}
GLUCOSE SERPL-MCNC: 213 MG/DL (ref 70–99)
HCT VFR BLD AUTO: 38.3 % (ref 36–48)
HGB BLD-MCNC: 12.6 G/DL (ref 12–16)
LYMPHOCYTES # BLD: 0.8 K/UL (ref 1–5.1)
LYMPHOCYTES NFR BLD: 10.8 %
MCH RBC QN AUTO: 30.2 PG (ref 26–34)
MCHC RBC AUTO-ENTMCNC: 32.9 G/DL (ref 31–36)
MCV RBC AUTO: 91.7 FL (ref 80–100)
MONOCYTES # BLD: 0.5 K/UL (ref 0–1.3)
MONOCYTES NFR BLD: 6 %
NEUTROPHILS # BLD: 6.2 K/UL (ref 1.7–7.7)
NEUTROPHILS NFR BLD: 83 %
ORGANISM: ABNORMAL
PLATELET # BLD AUTO: 298 K/UL (ref 135–450)
PMV BLD AUTO: 8.2 FL (ref 5–10.5)
POTASSIUM SERPL-SCNC: 4 MMOL/L (ref 3.5–5.1)
PROCALCITONIN SERPL IA-MCNC: 0.49 NG/ML (ref 0–0.15)
RBC # BLD AUTO: 4.18 M/UL (ref 4–5.2)
REPORT: NORMAL
REPORT: NORMAL
RESP PATH DNA+RNA PNL L RESP NAA+NON-PRB: ABNORMAL
RESP PATH DNA+RNA PNL NPH NAA+NON-PROBE: NORMAL
SODIUM SERPL-SCNC: 136 MMOL/L (ref 136–145)
WBC # BLD AUTO: 7.5 K/UL (ref 4–11)

## 2024-02-15 PROCEDURE — 87641 MR-STAPH DNA AMP PROBE: CPT

## 2024-02-15 PROCEDURE — 84145 PROCALCITONIN (PCT): CPT

## 2024-02-15 PROCEDURE — 87070 CULTURE OTHR SPECIMN AEROBIC: CPT

## 2024-02-15 PROCEDURE — 36415 COLL VENOUS BLD VENIPUNCTURE: CPT

## 2024-02-15 PROCEDURE — 87077 CULTURE AEROBIC IDENTIFY: CPT

## 2024-02-15 PROCEDURE — 6370000000 HC RX 637 (ALT 250 FOR IP): Performed by: HOSPITALIST

## 2024-02-15 PROCEDURE — 2580000003 HC RX 258: Performed by: HOSPITALIST

## 2024-02-15 PROCEDURE — 6360000002 HC RX W HCPCS: Performed by: HOSPITALIST

## 2024-02-15 PROCEDURE — 2700000000 HC OXYGEN THERAPY PER DAY

## 2024-02-15 PROCEDURE — 94669 MECHANICAL CHEST WALL OSCILL: CPT

## 2024-02-15 PROCEDURE — 87633 RESP VIRUS 12-25 TARGETS: CPT

## 2024-02-15 PROCEDURE — 0202U NFCT DS 22 TRGT SARS-COV-2: CPT

## 2024-02-15 PROCEDURE — 87205 SMEAR GRAM STAIN: CPT

## 2024-02-15 PROCEDURE — 80048 BASIC METABOLIC PNL TOTAL CA: CPT

## 2024-02-15 PROCEDURE — 6360000002 HC RX W HCPCS: Performed by: INTERNAL MEDICINE

## 2024-02-15 PROCEDURE — 6370000000 HC RX 637 (ALT 250 FOR IP): Performed by: NURSE PRACTITIONER

## 2024-02-15 PROCEDURE — 1200000000 HC SEMI PRIVATE

## 2024-02-15 PROCEDURE — 85025 COMPLETE CBC W/AUTO DIFF WBC: CPT

## 2024-02-15 PROCEDURE — 99223 1ST HOSP IP/OBS HIGH 75: CPT | Performed by: INTERNAL MEDICINE

## 2024-02-15 PROCEDURE — 2580000003 HC RX 258

## 2024-02-15 PROCEDURE — 94761 N-INVAS EAR/PLS OXIMETRY MLT: CPT

## 2024-02-15 PROCEDURE — 94640 AIRWAY INHALATION TREATMENT: CPT

## 2024-02-15 PROCEDURE — 2500000003 HC RX 250 WO HCPCS: Performed by: HOSPITALIST

## 2024-02-15 PROCEDURE — 2580000003 HC RX 258: Performed by: INTERNAL MEDICINE

## 2024-02-15 RX ORDER — WATER 10 ML/10ML
INJECTION INTRAMUSCULAR; INTRAVENOUS; SUBCUTANEOUS
Status: COMPLETED
Start: 2024-02-15 | End: 2024-02-15

## 2024-02-15 RX ADMIN — SODIUM CHLORIDE, PRESERVATIVE FREE 10 ML: 5 INJECTION INTRAVENOUS at 08:59

## 2024-02-15 RX ADMIN — Medication 1 TABLET: at 21:31

## 2024-02-15 RX ADMIN — IPRATROPIUM BROMIDE AND ALBUTEROL SULFATE 1 DOSE: 2.5; .5 SOLUTION RESPIRATORY (INHALATION) at 11:51

## 2024-02-15 RX ADMIN — ATORVASTATIN CALCIUM 80 MG: 80 TABLET, FILM COATED ORAL at 21:31

## 2024-02-15 RX ADMIN — MOMETASONE FUROATE AND FORMOTEROL FUMARATE DIHYDRATE 2 PUFF: 200; 5 AEROSOL RESPIRATORY (INHALATION) at 07:57

## 2024-02-15 RX ADMIN — DULOXETINE HYDROCHLORIDE 60 MG: 60 CAPSULE, DELAYED RELEASE ORAL at 08:54

## 2024-02-15 RX ADMIN — GABAPENTIN 600 MG: 300 CAPSULE ORAL at 21:31

## 2024-02-15 RX ADMIN — GABAPENTIN 600 MG: 300 CAPSULE ORAL at 08:55

## 2024-02-15 RX ADMIN — IPRATROPIUM BROMIDE AND ALBUTEROL SULFATE 1 DOSE: 2.5; .5 SOLUTION RESPIRATORY (INHALATION) at 19:42

## 2024-02-15 RX ADMIN — CEFEPIME 2000 MG: 2 INJECTION, POWDER, FOR SOLUTION INTRAVENOUS at 14:23

## 2024-02-15 RX ADMIN — WATER 10 ML: 1 INJECTION INTRAMUSCULAR; INTRAVENOUS; SUBCUTANEOUS at 14:17

## 2024-02-15 RX ADMIN — Medication 2000 UNITS: at 08:54

## 2024-02-15 RX ADMIN — MOMETASONE FUROATE AND FORMOTEROL FUMARATE DIHYDRATE 2 PUFF: 200; 5 AEROSOL RESPIRATORY (INHALATION) at 19:42

## 2024-02-15 RX ADMIN — ASPIRIN 81 MG: 81 TABLET, COATED ORAL at 08:54

## 2024-02-15 RX ADMIN — TRAZODONE HYDROCHLORIDE 50 MG: 50 TABLET ORAL at 21:35

## 2024-02-15 RX ADMIN — OXYCODONE AND ACETAMINOPHEN 1 TABLET: 7.5; 325 TABLET ORAL at 22:13

## 2024-02-15 RX ADMIN — OXYCODONE AND ACETAMINOPHEN 1 TABLET: 7.5; 325 TABLET ORAL at 08:56

## 2024-02-15 RX ADMIN — Medication 1 TABLET: at 08:54

## 2024-02-15 RX ADMIN — DOXYCYCLINE 100 MG: 100 INJECTION, POWDER, LYOPHILIZED, FOR SOLUTION INTRAVENOUS at 05:31

## 2024-02-15 RX ADMIN — OXYCODONE AND ACETAMINOPHEN 1 TABLET: 7.5; 325 TABLET ORAL at 16:47

## 2024-02-15 RX ADMIN — IPRATROPIUM BROMIDE AND ALBUTEROL SULFATE 1 DOSE: 2.5; .5 SOLUTION RESPIRATORY (INHALATION) at 15:55

## 2024-02-15 RX ADMIN — ENOXAPARIN SODIUM 40 MG: 100 INJECTION SUBCUTANEOUS at 08:55

## 2024-02-15 RX ADMIN — METHYLPREDNISOLONE SODIUM SUCCINATE 40 MG: 40 INJECTION INTRAMUSCULAR; INTRAVENOUS at 00:34

## 2024-02-15 RX ADMIN — PANTOPRAZOLE SODIUM 40 MG: 40 TABLET, DELAYED RELEASE ORAL at 05:29

## 2024-02-15 RX ADMIN — METHYLPREDNISOLONE SODIUM SUCCINATE 40 MG: 40 INJECTION INTRAMUSCULAR; INTRAVENOUS at 14:17

## 2024-02-15 RX ADMIN — SODIUM CHLORIDE: 9 INJECTION, SOLUTION INTRAVENOUS at 14:22

## 2024-02-15 RX ADMIN — SODIUM CHLORIDE, PRESERVATIVE FREE 10 ML: 5 INJECTION INTRAVENOUS at 21:35

## 2024-02-15 RX ADMIN — IPRATROPIUM BROMIDE AND ALBUTEROL SULFATE 1 DOSE: 2.5; .5 SOLUTION RESPIRATORY (INHALATION) at 07:57

## 2024-02-15 ASSESSMENT — PAIN DESCRIPTION - PAIN TYPE
TYPE: CHRONIC PAIN

## 2024-02-15 ASSESSMENT — PAIN SCALES - GENERAL
PAINLEVEL_OUTOF10: 3
PAINLEVEL_OUTOF10: 3
PAINLEVEL_OUTOF10: 8
PAINLEVEL_OUTOF10: 7
PAINLEVEL_OUTOF10: 7

## 2024-02-15 ASSESSMENT — PAIN DESCRIPTION - ONSET
ONSET: ON-GOING

## 2024-02-15 ASSESSMENT — PAIN - FUNCTIONAL ASSESSMENT
PAIN_FUNCTIONAL_ASSESSMENT: ACTIVITIES ARE NOT PREVENTED

## 2024-02-15 ASSESSMENT — PAIN DESCRIPTION - FREQUENCY
FREQUENCY: CONTINUOUS
FREQUENCY: CONTINUOUS

## 2024-02-15 ASSESSMENT — PAIN DESCRIPTION - ORIENTATION
ORIENTATION: RIGHT;LEFT
ORIENTATION: RIGHT;LEFT;MID
ORIENTATION: LOWER

## 2024-02-15 ASSESSMENT — PAIN DESCRIPTION - LOCATION
LOCATION: BACK

## 2024-02-15 ASSESSMENT — PAIN DESCRIPTION - DESCRIPTORS
DESCRIPTORS: ACHING

## 2024-02-15 NOTE — CONSULTS
Pulmonary Consult Note     Patient's name:  Lalita Pimentel  Medical Record Number: [id number]  Patient's account/billing number: [id number]  Patient's YOB: 1945  Age: 78 y.o.  Date of Admission: 2/14/2024 12:21 PM  Date of Consult: 2/15/2024      Primary Care Physician: Belkis Silva APRN - CNP      Code Status: Full Code    Reason for consult: Acute on chronic respiratory failure with hypoxia and hypercapnia    Assessment and Plan     Acute on chronic respiratory failure with hypoxia and hypercapnia less than 90% of pulmonary  Severe COPD with acute exacerbation  Gram-negative pneumonia      Plan:  Cefepime to cover gram-negative pneumonia, check MRSA  Systemic steroid  Dulera and DuoNeb  Acapella  Check sputum culture results  BiPAP as needed for increased work of breathing.      HISTORY OF PRESENT ILLNESS:   Mr./Ms. Lalita Pimentel is a 78 y.o. lady with past medical history stated below significant for severe COPD, bullous emphysema, presented to the hospital with worsening shortness of breath wheezing chest tightness cough productive of yellow sputum no hemoptysis    FEV1 between 50-59% predicted   CT of the chest with no PE, no acute cardiopulmonary process, extensive parenchymal disease again noted, bullous emphysema, no pneumothorax    Past Medical History:        Diagnosis Date    CAD (coronary artery disease) 07/19/2022    NSTEMI, PCI LCx    Chronic pain syndrome     Percocet. Dr. 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

## 2024-02-15 NOTE — FLOWSHEET NOTE
Secure message sent to Merline Hinds NP per patient request for nightly trazadone, new order obtained.

## 2024-02-16 LAB
BACTERIA SPEC RESP CULT: ABNORMAL
BACTERIA SPEC RESP CULT: ABNORMAL
GRAM STN SPEC: ABNORMAL
MRSA DNA SPEC QL NAA+PROBE: NORMAL
ORGANISM: ABNORMAL

## 2024-02-16 PROCEDURE — 6370000000 HC RX 637 (ALT 250 FOR IP): Performed by: STUDENT IN AN ORGANIZED HEALTH CARE EDUCATION/TRAINING PROGRAM

## 2024-02-16 PROCEDURE — 6360000002 HC RX W HCPCS: Performed by: INTERNAL MEDICINE

## 2024-02-16 PROCEDURE — 94640 AIRWAY INHALATION TREATMENT: CPT

## 2024-02-16 PROCEDURE — 2580000003 HC RX 258

## 2024-02-16 PROCEDURE — 6370000000 HC RX 637 (ALT 250 FOR IP): Performed by: NURSE PRACTITIONER

## 2024-02-16 PROCEDURE — 1200000000 HC SEMI PRIVATE

## 2024-02-16 PROCEDURE — 6370000000 HC RX 637 (ALT 250 FOR IP): Performed by: HOSPITALIST

## 2024-02-16 PROCEDURE — 6360000002 HC RX W HCPCS: Performed by: HOSPITALIST

## 2024-02-16 PROCEDURE — 94761 N-INVAS EAR/PLS OXIMETRY MLT: CPT

## 2024-02-16 PROCEDURE — 94669 MECHANICAL CHEST WALL OSCILL: CPT

## 2024-02-16 PROCEDURE — 2580000003 HC RX 258: Performed by: INTERNAL MEDICINE

## 2024-02-16 PROCEDURE — 99232 SBSQ HOSP IP/OBS MODERATE 35: CPT | Performed by: INTERNAL MEDICINE

## 2024-02-16 PROCEDURE — 2700000000 HC OXYGEN THERAPY PER DAY

## 2024-02-16 RX ORDER — METHYLPREDNISOLONE SODIUM SUCCINATE 40 MG/ML
40 INJECTION, POWDER, LYOPHILIZED, FOR SOLUTION INTRAMUSCULAR; INTRAVENOUS DAILY
Status: DISCONTINUED | OUTPATIENT
Start: 2024-02-17 | End: 2024-02-18

## 2024-02-16 RX ORDER — LIDOCAINE 4 G/G
1 PATCH TOPICAL DAILY
Status: DISCONTINUED | OUTPATIENT
Start: 2024-02-16 | End: 2024-02-19 | Stop reason: HOSPADM

## 2024-02-16 RX ORDER — WATER 10 ML/10ML
INJECTION INTRAMUSCULAR; INTRAVENOUS; SUBCUTANEOUS
Status: COMPLETED
Start: 2024-02-16 | End: 2024-02-16

## 2024-02-16 RX ADMIN — ASPIRIN 81 MG: 81 TABLET, COATED ORAL at 09:49

## 2024-02-16 RX ADMIN — MOMETASONE FUROATE AND FORMOTEROL FUMARATE DIHYDRATE 2 PUFF: 200; 5 AEROSOL RESPIRATORY (INHALATION) at 20:22

## 2024-02-16 RX ADMIN — PANTOPRAZOLE SODIUM 40 MG: 40 TABLET, DELAYED RELEASE ORAL at 06:10

## 2024-02-16 RX ADMIN — GABAPENTIN 600 MG: 300 CAPSULE ORAL at 21:24

## 2024-02-16 RX ADMIN — METHYLPREDNISOLONE SODIUM SUCCINATE 40 MG: 40 INJECTION INTRAMUSCULAR; INTRAVENOUS at 13:02

## 2024-02-16 RX ADMIN — WATER: 1 INJECTION INTRAMUSCULAR; INTRAVENOUS; SUBCUTANEOUS at 14:31

## 2024-02-16 RX ADMIN — OXYCODONE AND ACETAMINOPHEN 1 TABLET: 7.5; 325 TABLET ORAL at 06:30

## 2024-02-16 RX ADMIN — OXYCODONE AND ACETAMINOPHEN 1 TABLET: 7.5; 325 TABLET ORAL at 16:11

## 2024-02-16 RX ADMIN — IPRATROPIUM BROMIDE AND ALBUTEROL SULFATE 1 DOSE: 2.5; .5 SOLUTION RESPIRATORY (INHALATION) at 20:22

## 2024-02-16 RX ADMIN — Medication 1 TABLET: at 09:49

## 2024-02-16 RX ADMIN — ATORVASTATIN CALCIUM 80 MG: 80 TABLET, FILM COATED ORAL at 21:24

## 2024-02-16 RX ADMIN — CEFEPIME 2000 MG: 2 INJECTION, POWDER, FOR SOLUTION INTRAVENOUS at 13:06

## 2024-02-16 RX ADMIN — METHYLPREDNISOLONE SODIUM SUCCINATE 40 MG: 40 INJECTION INTRAMUSCULAR; INTRAVENOUS at 03:43

## 2024-02-16 RX ADMIN — ENOXAPARIN SODIUM 40 MG: 100 INJECTION SUBCUTANEOUS at 09:49

## 2024-02-16 RX ADMIN — IPRATROPIUM BROMIDE AND ALBUTEROL SULFATE 1 DOSE: 2.5; .5 SOLUTION RESPIRATORY (INHALATION) at 16:04

## 2024-02-16 RX ADMIN — CEFEPIME 2000 MG: 2 INJECTION, POWDER, FOR SOLUTION INTRAVENOUS at 03:36

## 2024-02-16 RX ADMIN — DULOXETINE HYDROCHLORIDE 60 MG: 60 CAPSULE, DELAYED RELEASE ORAL at 09:49

## 2024-02-16 RX ADMIN — MOMETASONE FUROATE AND FORMOTEROL FUMARATE DIHYDRATE 2 PUFF: 200; 5 AEROSOL RESPIRATORY (INHALATION) at 09:08

## 2024-02-16 RX ADMIN — IPRATROPIUM BROMIDE AND ALBUTEROL SULFATE 1 DOSE: 2.5; .5 SOLUTION RESPIRATORY (INHALATION) at 09:08

## 2024-02-16 RX ADMIN — WATER 1 ML: 1 INJECTION INTRAMUSCULAR; INTRAVENOUS; SUBCUTANEOUS at 03:43

## 2024-02-16 RX ADMIN — IPRATROPIUM BROMIDE AND ALBUTEROL SULFATE 1 DOSE: 2.5; .5 SOLUTION RESPIRATORY (INHALATION) at 12:15

## 2024-02-16 RX ADMIN — Medication 1 TABLET: at 21:23

## 2024-02-16 RX ADMIN — GABAPENTIN 600 MG: 300 CAPSULE ORAL at 09:49

## 2024-02-16 RX ADMIN — TRAZODONE HYDROCHLORIDE 50 MG: 50 TABLET ORAL at 21:24

## 2024-02-16 RX ADMIN — Medication 2000 UNITS: at 09:49

## 2024-02-16 ASSESSMENT — PAIN DESCRIPTION - DESCRIPTORS
DESCRIPTORS: ACHING
DESCRIPTORS: SHARP
DESCRIPTORS: ACHING;BURNING;SHARP

## 2024-02-16 ASSESSMENT — PAIN DESCRIPTION - LOCATION
LOCATION: BACK

## 2024-02-16 ASSESSMENT — PAIN DESCRIPTION - ORIENTATION: ORIENTATION: LOWER

## 2024-02-16 ASSESSMENT — PAIN SCALES - GENERAL
PAINLEVEL_OUTOF10: 7
PAINLEVEL_OUTOF10: 9
PAINLEVEL_OUTOF10: 8

## 2024-02-17 LAB
BACTERIA SPEC RESP CULT: ABNORMAL
BACTERIA SPEC RESP CULT: ABNORMAL
GLUCOSE BLD-MCNC: 145 MG/DL (ref 70–99)
GRAM STN SPEC: ABNORMAL
ORGANISM: ABNORMAL
PERFORMED ON: ABNORMAL

## 2024-02-17 PROCEDURE — 2700000000 HC OXYGEN THERAPY PER DAY

## 2024-02-17 PROCEDURE — 6370000000 HC RX 637 (ALT 250 FOR IP): Performed by: NURSE PRACTITIONER

## 2024-02-17 PROCEDURE — 2580000003 HC RX 258: Performed by: HOSPITALIST

## 2024-02-17 PROCEDURE — 2580000003 HC RX 258: Performed by: INTERNAL MEDICINE

## 2024-02-17 PROCEDURE — 94761 N-INVAS EAR/PLS OXIMETRY MLT: CPT

## 2024-02-17 PROCEDURE — 6360000002 HC RX W HCPCS: Performed by: INTERNAL MEDICINE

## 2024-02-17 PROCEDURE — 6360000002 HC RX W HCPCS: Performed by: HOSPITALIST

## 2024-02-17 PROCEDURE — 94669 MECHANICAL CHEST WALL OSCILL: CPT

## 2024-02-17 PROCEDURE — 99232 SBSQ HOSP IP/OBS MODERATE 35: CPT | Performed by: INTERNAL MEDICINE

## 2024-02-17 PROCEDURE — 6370000000 HC RX 637 (ALT 250 FOR IP): Performed by: HOSPITALIST

## 2024-02-17 PROCEDURE — 2580000003 HC RX 258

## 2024-02-17 PROCEDURE — 94640 AIRWAY INHALATION TREATMENT: CPT

## 2024-02-17 PROCEDURE — 6370000000 HC RX 637 (ALT 250 FOR IP): Performed by: STUDENT IN AN ORGANIZED HEALTH CARE EDUCATION/TRAINING PROGRAM

## 2024-02-17 PROCEDURE — 1200000000 HC SEMI PRIVATE

## 2024-02-17 PROCEDURE — 6370000000 HC RX 637 (ALT 250 FOR IP): Performed by: INTERNAL MEDICINE

## 2024-02-17 RX ORDER — HYDRALAZINE HYDROCHLORIDE 20 MG/ML
10 INJECTION INTRAMUSCULAR; INTRAVENOUS EVERY 6 HOURS PRN
Status: DISCONTINUED | OUTPATIENT
Start: 2024-02-17 | End: 2024-02-19 | Stop reason: HOSPADM

## 2024-02-17 RX ORDER — HYDRALAZINE HYDROCHLORIDE 20 MG/ML
10 INJECTION INTRAMUSCULAR; INTRAVENOUS EVERY 6 HOURS PRN
Status: DISCONTINUED | OUTPATIENT
Start: 2024-02-17 | End: 2024-02-17

## 2024-02-17 RX ORDER — AMOXICILLIN AND CLAVULANATE POTASSIUM 875; 125 MG/1; MG/1
1 TABLET, FILM COATED ORAL EVERY 12 HOURS SCHEDULED
Status: DISCONTINUED | OUTPATIENT
Start: 2024-02-17 | End: 2024-02-19 | Stop reason: HOSPADM

## 2024-02-17 RX ORDER — WATER 10 ML/10ML
INJECTION INTRAMUSCULAR; INTRAVENOUS; SUBCUTANEOUS
Status: COMPLETED
Start: 2024-02-17 | End: 2024-02-17

## 2024-02-17 RX ORDER — SENNA AND DOCUSATE SODIUM 50; 8.6 MG/1; MG/1
2 TABLET, FILM COATED ORAL DAILY
Status: DISCONTINUED | OUTPATIENT
Start: 2024-02-17 | End: 2024-02-19 | Stop reason: HOSPADM

## 2024-02-17 RX ADMIN — SODIUM CHLORIDE, PRESERVATIVE FREE 10 ML: 5 INJECTION INTRAVENOUS at 20:03

## 2024-02-17 RX ADMIN — Medication 1 TABLET: at 20:03

## 2024-02-17 RX ADMIN — IPRATROPIUM BROMIDE AND ALBUTEROL SULFATE 1 DOSE: 2.5; .5 SOLUTION RESPIRATORY (INHALATION) at 11:33

## 2024-02-17 RX ADMIN — CEFEPIME 2000 MG: 2 INJECTION, POWDER, FOR SOLUTION INTRAVENOUS at 13:44

## 2024-02-17 RX ADMIN — TRAZODONE HYDROCHLORIDE 50 MG: 50 TABLET ORAL at 20:03

## 2024-02-17 RX ADMIN — AMOXICILLIN AND CLAVULANATE POTASSIUM 1 TABLET: 875; 125 TABLET, FILM COATED ORAL at 20:03

## 2024-02-17 RX ADMIN — MOMETASONE FUROATE AND FORMOTEROL FUMARATE DIHYDRATE 2 PUFF: 200; 5 AEROSOL RESPIRATORY (INHALATION) at 07:44

## 2024-02-17 RX ADMIN — SENNOSIDES AND DOCUSATE SODIUM 2 TABLET: 8.6; 5 TABLET ORAL at 14:59

## 2024-02-17 RX ADMIN — Medication 2000 UNITS: at 08:52

## 2024-02-17 RX ADMIN — ASPIRIN 81 MG: 81 TABLET, COATED ORAL at 08:51

## 2024-02-17 RX ADMIN — ENOXAPARIN SODIUM 40 MG: 100 INJECTION SUBCUTANEOUS at 08:52

## 2024-02-17 RX ADMIN — SODIUM CHLORIDE, PRESERVATIVE FREE 10 ML: 5 INJECTION INTRAVENOUS at 09:00

## 2024-02-17 RX ADMIN — PANTOPRAZOLE SODIUM 40 MG: 40 TABLET, DELAYED RELEASE ORAL at 05:11

## 2024-02-17 RX ADMIN — Medication 1 TABLET: at 08:52

## 2024-02-17 RX ADMIN — OXYCODONE AND ACETAMINOPHEN 1 TABLET: 7.5; 325 TABLET ORAL at 04:55

## 2024-02-17 RX ADMIN — IPRATROPIUM BROMIDE AND ALBUTEROL SULFATE 1 DOSE: 2.5; .5 SOLUTION RESPIRATORY (INHALATION) at 07:34

## 2024-02-17 RX ADMIN — MOMETASONE FUROATE AND FORMOTEROL FUMARATE DIHYDRATE 2 PUFF: 200; 5 AEROSOL RESPIRATORY (INHALATION) at 20:19

## 2024-02-17 RX ADMIN — OXYCODONE AND ACETAMINOPHEN 1 TABLET: 7.5; 325 TABLET ORAL at 17:27

## 2024-02-17 RX ADMIN — CEFEPIME 2000 MG: 2 INJECTION, POWDER, FOR SOLUTION INTRAVENOUS at 01:30

## 2024-02-17 RX ADMIN — IPRATROPIUM BROMIDE AND ALBUTEROL SULFATE 1 DOSE: 2.5; .5 SOLUTION RESPIRATORY (INHALATION) at 20:19

## 2024-02-17 RX ADMIN — WATER: 1 INJECTION INTRAMUSCULAR; INTRAVENOUS; SUBCUTANEOUS at 09:00

## 2024-02-17 RX ADMIN — GABAPENTIN 600 MG: 300 CAPSULE ORAL at 08:51

## 2024-02-17 RX ADMIN — ATORVASTATIN CALCIUM 80 MG: 80 TABLET, FILM COATED ORAL at 20:04

## 2024-02-17 RX ADMIN — DULOXETINE HYDROCHLORIDE 60 MG: 60 CAPSULE, DELAYED RELEASE ORAL at 08:52

## 2024-02-17 RX ADMIN — METHYLPREDNISOLONE SODIUM SUCCINATE 40 MG: 40 INJECTION INTRAMUSCULAR; INTRAVENOUS at 08:52

## 2024-02-17 RX ADMIN — GABAPENTIN 600 MG: 300 CAPSULE ORAL at 20:03

## 2024-02-17 ASSESSMENT — PAIN DESCRIPTION - DESCRIPTORS
DESCRIPTORS: ACHING
DESCRIPTORS: ACHING

## 2024-02-17 ASSESSMENT — PAIN DESCRIPTION - LOCATION
LOCATION: BACK

## 2024-02-17 ASSESSMENT — PAIN DESCRIPTION - ORIENTATION
ORIENTATION: LOWER
ORIENTATION: MID

## 2024-02-17 ASSESSMENT — PAIN SCALES - GENERAL
PAINLEVEL_OUTOF10: 8

## 2024-02-18 PROCEDURE — 94669 MECHANICAL CHEST WALL OSCILL: CPT

## 2024-02-18 PROCEDURE — 2700000000 HC OXYGEN THERAPY PER DAY

## 2024-02-18 PROCEDURE — 94640 AIRWAY INHALATION TREATMENT: CPT

## 2024-02-18 PROCEDURE — 6370000000 HC RX 637 (ALT 250 FOR IP): Performed by: NURSE PRACTITIONER

## 2024-02-18 PROCEDURE — 6360000002 HC RX W HCPCS: Performed by: INTERNAL MEDICINE

## 2024-02-18 PROCEDURE — 6370000000 HC RX 637 (ALT 250 FOR IP): Performed by: HOSPITALIST

## 2024-02-18 PROCEDURE — 6360000002 HC RX W HCPCS: Performed by: HOSPITALIST

## 2024-02-18 PROCEDURE — 6370000000 HC RX 637 (ALT 250 FOR IP): Performed by: INTERNAL MEDICINE

## 2024-02-18 PROCEDURE — 6370000000 HC RX 637 (ALT 250 FOR IP): Performed by: STUDENT IN AN ORGANIZED HEALTH CARE EDUCATION/TRAINING PROGRAM

## 2024-02-18 PROCEDURE — 94760 N-INVAS EAR/PLS OXIMETRY 1: CPT

## 2024-02-18 PROCEDURE — 1200000000 HC SEMI PRIVATE

## 2024-02-18 PROCEDURE — 2580000003 HC RX 258: Performed by: HOSPITALIST

## 2024-02-18 PROCEDURE — 99232 SBSQ HOSP IP/OBS MODERATE 35: CPT | Performed by: INTERNAL MEDICINE

## 2024-02-18 RX ORDER — PREDNISONE 20 MG/1
40 TABLET ORAL DAILY
Status: DISCONTINUED | OUTPATIENT
Start: 2024-02-18 | End: 2024-02-19 | Stop reason: HOSPADM

## 2024-02-18 RX ADMIN — IPRATROPIUM BROMIDE AND ALBUTEROL SULFATE 1 DOSE: 2.5; .5 SOLUTION RESPIRATORY (INHALATION) at 21:24

## 2024-02-18 RX ADMIN — AMOXICILLIN AND CLAVULANATE POTASSIUM 1 TABLET: 875; 125 TABLET, FILM COATED ORAL at 20:41

## 2024-02-18 RX ADMIN — IPRATROPIUM BROMIDE AND ALBUTEROL SULFATE 1 DOSE: 2.5; .5 SOLUTION RESPIRATORY (INHALATION) at 09:18

## 2024-02-18 RX ADMIN — ACETAMINOPHEN 650 MG: 325 TABLET ORAL at 09:05

## 2024-02-18 RX ADMIN — IPRATROPIUM BROMIDE AND ALBUTEROL SULFATE 1 DOSE: 2.5; .5 SOLUTION RESPIRATORY (INHALATION) at 12:14

## 2024-02-18 RX ADMIN — SODIUM CHLORIDE, PRESERVATIVE FREE 10 ML: 5 INJECTION INTRAVENOUS at 20:44

## 2024-02-18 RX ADMIN — GABAPENTIN 600 MG: 300 CAPSULE ORAL at 20:42

## 2024-02-18 RX ADMIN — PREDNISONE 40 MG: 20 TABLET ORAL at 16:38

## 2024-02-18 RX ADMIN — IPRATROPIUM BROMIDE AND ALBUTEROL SULFATE 1 DOSE: 2.5; .5 SOLUTION RESPIRATORY (INHALATION) at 16:10

## 2024-02-18 RX ADMIN — OXYCODONE AND ACETAMINOPHEN 1 TABLET: 7.5; 325 TABLET ORAL at 12:42

## 2024-02-18 RX ADMIN — Medication 1 TABLET: at 09:11

## 2024-02-18 RX ADMIN — AMOXICILLIN AND CLAVULANATE POTASSIUM 1 TABLET: 875; 125 TABLET, FILM COATED ORAL at 09:04

## 2024-02-18 RX ADMIN — MOMETASONE FUROATE AND FORMOTEROL FUMARATE DIHYDRATE 2 PUFF: 200; 5 AEROSOL RESPIRATORY (INHALATION) at 21:24

## 2024-02-18 RX ADMIN — OXYCODONE AND ACETAMINOPHEN 1 TABLET: 7.5; 325 TABLET ORAL at 18:56

## 2024-02-18 RX ADMIN — PANTOPRAZOLE SODIUM 40 MG: 40 TABLET, DELAYED RELEASE ORAL at 05:20

## 2024-02-18 RX ADMIN — MOMETASONE FUROATE AND FORMOTEROL FUMARATE DIHYDRATE 2 PUFF: 200; 5 AEROSOL RESPIRATORY (INHALATION) at 09:19

## 2024-02-18 RX ADMIN — OXYCODONE AND ACETAMINOPHEN 1 TABLET: 7.5; 325 TABLET ORAL at 04:38

## 2024-02-18 RX ADMIN — GABAPENTIN 600 MG: 300 CAPSULE ORAL at 09:03

## 2024-02-18 RX ADMIN — METHYLPREDNISOLONE SODIUM SUCCINATE 40 MG: 40 INJECTION INTRAMUSCULAR; INTRAVENOUS at 09:11

## 2024-02-18 RX ADMIN — SODIUM CHLORIDE, PRESERVATIVE FREE 10 ML: 5 INJECTION INTRAVENOUS at 09:12

## 2024-02-18 RX ADMIN — HYDRALAZINE HYDROCHLORIDE 10 MG: 20 INJECTION, SOLUTION INTRAMUSCULAR; INTRAVENOUS at 04:37

## 2024-02-18 RX ADMIN — TRAZODONE HYDROCHLORIDE 50 MG: 50 TABLET ORAL at 20:42

## 2024-02-18 RX ADMIN — DULOXETINE HYDROCHLORIDE 60 MG: 60 CAPSULE, DELAYED RELEASE ORAL at 09:04

## 2024-02-18 RX ADMIN — ATORVASTATIN CALCIUM 80 MG: 80 TABLET, FILM COATED ORAL at 20:42

## 2024-02-18 RX ADMIN — ENOXAPARIN SODIUM 40 MG: 100 INJECTION SUBCUTANEOUS at 09:02

## 2024-02-18 RX ADMIN — Medication 1 TABLET: at 20:42

## 2024-02-18 RX ADMIN — ASPIRIN 81 MG: 81 TABLET, COATED ORAL at 09:04

## 2024-02-18 RX ADMIN — Medication 2000 UNITS: at 09:04

## 2024-02-18 ASSESSMENT — PAIN DESCRIPTION - DESCRIPTORS
DESCRIPTORS: ACHING
DESCRIPTORS: STABBING
DESCRIPTORS: ACHING
DESCRIPTORS: SORE

## 2024-02-18 ASSESSMENT — PAIN SCALES - GENERAL
PAINLEVEL_OUTOF10: 8
PAINLEVEL_OUTOF10: 9
PAINLEVEL_OUTOF10: 7
PAINLEVEL_OUTOF10: 3

## 2024-02-18 ASSESSMENT — PAIN DESCRIPTION - ORIENTATION
ORIENTATION: MID;LOWER
ORIENTATION: MID;LOWER
ORIENTATION: MID
ORIENTATION: LOWER

## 2024-02-18 ASSESSMENT — PAIN DESCRIPTION - LOCATION
LOCATION: BACK

## 2024-02-18 ASSESSMENT — PAIN - FUNCTIONAL ASSESSMENT: PAIN_FUNCTIONAL_ASSESSMENT: ACTIVITIES ARE NOT PREVENTED

## 2024-02-19 VITALS
OXYGEN SATURATION: 91 % | RESPIRATION RATE: 18 BRPM | HEIGHT: 64 IN | HEART RATE: 92 BPM | TEMPERATURE: 98.9 F | SYSTOLIC BLOOD PRESSURE: 132 MMHG | WEIGHT: 140.43 LBS | BODY MASS INDEX: 23.98 KG/M2 | DIASTOLIC BLOOD PRESSURE: 70 MMHG

## 2024-02-19 LAB
ANION GAP SERPL CALCULATED.3IONS-SCNC: 12 MMOL/L (ref 3–16)
ANISOCYTOSIS BLD QL SMEAR: ABNORMAL
BASOPHILS # BLD: 0 K/UL (ref 0–0.2)
BASOPHILS NFR BLD: 0 %
BUN SERPL-MCNC: 15 MG/DL (ref 7–20)
CALCIUM SERPL-MCNC: 10.6 MG/DL (ref 8.3–10.6)
CHLORIDE SERPL-SCNC: 99 MMOL/L (ref 99–110)
CO2 SERPL-SCNC: 29 MMOL/L (ref 21–32)
CREAT SERPL-MCNC: 0.8 MG/DL (ref 0.6–1.2)
DEPRECATED RDW RBC AUTO: 15.3 % (ref 12.4–15.4)
EOSINOPHIL # BLD: 0.2 K/UL (ref 0–0.6)
EOSINOPHIL NFR BLD: 1 %
GFR SERPLBLD CREATININE-BSD FMLA CKD-EPI: >60 ML/MIN/{1.73_M2}
GLUCOSE SERPL-MCNC: 158 MG/DL (ref 70–99)
HCT VFR BLD AUTO: 38.2 % (ref 36–48)
HGB BLD-MCNC: 12.5 G/DL (ref 12–16)
LYMPHOCYTES # BLD: 0.8 K/UL (ref 1–5.1)
LYMPHOCYTES NFR BLD: 5 %
MACROCYTES BLD QL SMEAR: ABNORMAL
MCH RBC QN AUTO: 29.7 PG (ref 26–34)
MCHC RBC AUTO-ENTMCNC: 32.8 G/DL (ref 31–36)
MCV RBC AUTO: 90.6 FL (ref 80–100)
METAMYELOCYTES NFR BLD MANUAL: 1 %
MONOCYTES # BLD: 0.8 K/UL (ref 0–1.3)
MONOCYTES NFR BLD: 5 %
MYELOCYTES NFR BLD MANUAL: 4 %
NEUTROPHILS # BLD: 15 K/UL (ref 1.7–7.7)
NEUTROPHILS NFR BLD: 82 %
NEUTS BAND NFR BLD MANUAL: 1 % (ref 0–7)
PLATELET # BLD AUTO: 368 K/UL (ref 135–450)
PMV BLD AUTO: 7.8 FL (ref 5–10.5)
POTASSIUM SERPL-SCNC: 3.6 MMOL/L (ref 3.5–5.1)
PROMYELOCYTES NFR BLD MANUAL: 1 %
RBC # BLD AUTO: 4.22 M/UL (ref 4–5.2)
SODIUM SERPL-SCNC: 140 MMOL/L (ref 136–145)
WBC # BLD AUTO: 16.8 K/UL (ref 4–11)

## 2024-02-19 PROCEDURE — 94640 AIRWAY INHALATION TREATMENT: CPT

## 2024-02-19 PROCEDURE — 36415 COLL VENOUS BLD VENIPUNCTURE: CPT

## 2024-02-19 PROCEDURE — 6360000002 HC RX W HCPCS: Performed by: HOSPITALIST

## 2024-02-19 PROCEDURE — 6370000000 HC RX 637 (ALT 250 FOR IP): Performed by: STUDENT IN AN ORGANIZED HEALTH CARE EDUCATION/TRAINING PROGRAM

## 2024-02-19 PROCEDURE — 94680 O2 UPTK RST&XERS DIR SIMPLE: CPT

## 2024-02-19 PROCEDURE — 94669 MECHANICAL CHEST WALL OSCILL: CPT

## 2024-02-19 PROCEDURE — 94760 N-INVAS EAR/PLS OXIMETRY 1: CPT

## 2024-02-19 PROCEDURE — 6370000000 HC RX 637 (ALT 250 FOR IP): Performed by: HOSPITALIST

## 2024-02-19 PROCEDURE — 99232 SBSQ HOSP IP/OBS MODERATE 35: CPT | Performed by: INTERNAL MEDICINE

## 2024-02-19 PROCEDURE — 6370000000 HC RX 637 (ALT 250 FOR IP): Performed by: INTERNAL MEDICINE

## 2024-02-19 PROCEDURE — 2580000003 HC RX 258: Performed by: HOSPITALIST

## 2024-02-19 PROCEDURE — 85025 COMPLETE CBC W/AUTO DIFF WBC: CPT

## 2024-02-19 PROCEDURE — 80048 BASIC METABOLIC PNL TOTAL CA: CPT

## 2024-02-19 RX ORDER — AMOXICILLIN AND CLAVULANATE POTASSIUM 875; 125 MG/1; MG/1
1 TABLET, FILM COATED ORAL EVERY 12 HOURS SCHEDULED
Qty: 6 TABLET | Refills: 0 | Status: SHIPPED | OUTPATIENT
Start: 2024-02-19 | End: 2024-02-22

## 2024-02-19 RX ORDER — PREDNISONE 20 MG/1
40 TABLET ORAL DAILY
Qty: 6 TABLET | Refills: 0 | Status: SHIPPED | OUTPATIENT
Start: 2024-02-20 | End: 2024-02-23

## 2024-02-19 RX ADMIN — SENNOSIDES AND DOCUSATE SODIUM 2 TABLET: 8.6; 5 TABLET ORAL at 09:02

## 2024-02-19 RX ADMIN — ENOXAPARIN SODIUM 40 MG: 100 INJECTION SUBCUTANEOUS at 09:03

## 2024-02-19 RX ADMIN — PANTOPRAZOLE SODIUM 40 MG: 40 TABLET, DELAYED RELEASE ORAL at 05:17

## 2024-02-19 RX ADMIN — GABAPENTIN 600 MG: 300 CAPSULE ORAL at 09:03

## 2024-02-19 RX ADMIN — Medication 1 TABLET: at 09:02

## 2024-02-19 RX ADMIN — MOMETASONE FUROATE AND FORMOTEROL FUMARATE DIHYDRATE 2 PUFF: 200; 5 AEROSOL RESPIRATORY (INHALATION) at 08:26

## 2024-02-19 RX ADMIN — DULOXETINE HYDROCHLORIDE 60 MG: 60 CAPSULE, DELAYED RELEASE ORAL at 09:02

## 2024-02-19 RX ADMIN — IPRATROPIUM BROMIDE AND ALBUTEROL SULFATE 1 DOSE: 2.5; .5 SOLUTION RESPIRATORY (INHALATION) at 08:26

## 2024-02-19 RX ADMIN — PREDNISONE 40 MG: 20 TABLET ORAL at 09:02

## 2024-02-19 RX ADMIN — Medication 2000 UNITS: at 09:02

## 2024-02-19 RX ADMIN — AMOXICILLIN AND CLAVULANATE POTASSIUM 1 TABLET: 875; 125 TABLET, FILM COATED ORAL at 09:02

## 2024-02-19 RX ADMIN — ASPIRIN 81 MG: 81 TABLET, COATED ORAL at 09:02

## 2024-02-19 RX ADMIN — SODIUM CHLORIDE, PRESERVATIVE FREE 10 ML: 5 INJECTION INTRAVENOUS at 09:04

## 2024-02-19 RX ADMIN — OXYCODONE AND ACETAMINOPHEN 1 TABLET: 7.5; 325 TABLET ORAL at 03:30

## 2024-02-19 ASSESSMENT — PAIN SCALES - GENERAL
PAINLEVEL_OUTOF10: 0
PAINLEVEL_OUTOF10: 8
PAINLEVEL_OUTOF10: 0

## 2024-02-19 ASSESSMENT — PAIN DESCRIPTION - ORIENTATION: ORIENTATION: LOWER

## 2024-02-19 ASSESSMENT — PAIN DESCRIPTION - LOCATION: LOCATION: BACK

## 2024-02-19 ASSESSMENT — PAIN - FUNCTIONAL ASSESSMENT: PAIN_FUNCTIONAL_ASSESSMENT: ACTIVITIES ARE NOT PREVENTED

## 2024-02-19 ASSESSMENT — PAIN DESCRIPTION - DESCRIPTORS: DESCRIPTORS: ACHING

## 2024-02-19 NOTE — PROGRESS NOTES
Pulmonary Consult Note     Patient's name:  Lalita Pimentel  Medical Record Number: [id number]  Patient's account/billing number: [id number]  Patient's YOB: 1945  Age: 78 y.o.  Date of Admission: 2/14/2024 12:21 PM  Date of Consult: 2/16/2024      Primary Care Physician: Belkis Silva APRN - CNP      Code Status: Full Code    Reason for consult: Acute on chronic respiratory failure with hypoxia and hypercapnia    Assessment and Plan     Acute on chronic respiratory failure with hypoxia and hypercapnia less than 90% of pulmonary  Severe COPD with acute exacerbation  Haemophilus influenzae pneumonia      Plan:  Cefepime   Systemic steroid  Dulera and DuoNeb  Acapella  Check sputum culture results        HISTORY OF PRESENT ILLNESS:   Mr./Ms. Lalita Pimentel is a 78 y.o. lady with past medical history stated below significant for severe COPD, bullous emphysema, presented to the hospital with worsening shortness of breath wheezing chest tightness cough productive of yellow sputum no hemoptysis    FEV1 between 50-59% predicted   CT of the chest with no PE, no acute cardiopulmonary process, extensive parenchymal disease again noted, bullous emphysema, no pneumothorax      REVIEW OF SYSTEMS:  Review of Systems -   General ROS: negative  Psychological ROS: negative  Ophthalmic ROS: negative  ENT ROS: negative  Allergy and Immunology ROS: negative  Hematological and Lymphatic ROS: negative  Endocrine ROS: negative  Breast ROS: negative  Respiratory ROS: no cough, shortness of breath, or wheezing  Cardiovascular ROS: no chest pain or dyspnea on exertion  Gastrointestinal ROS:negative  Genito-Urinary ROS: negative  Musculoskeletal ROS: negative  Neurological ROS: negative  Dermatological ROS: negative        Physical Exam:    Vitals: /71   Pulse 85   Temp 98 °F (36.7 °C) (Oral)   Resp 20   Ht 1.626 m (5' 4\")   Wt 61.9 kg (136 lb 7.4 oz)   SpO2 
    V2.0  [facility] [place]      Name:  Lalita Pimentel /Age/Sex: 1945  (78 y.o. female)   MRN & CSN:  [id number] & [id number] Encounter Date/Time: 2024 1:16 PM EST    Location:  [id number] PCP: Belkis Silva, HECTOR Nation CNP       Hospital Day: 3    Assessment and Plan:   Lalita Pimentel is a 78 y.o. female with medical history significant for CAD s/p stenting, GERD, advanced COPD who was admitted with     Assessment/Plan :   1.  Acute exacerbation of COPD. CT chest with bullous emphysema.  Continue nebs, systemic steroid, ICS/LABA.    2.  Right-sided pneumonia on personal review of CT chest. Check pneumonia panel, nasal MRSA swab, respiratory viral panel and procalcitonin. Continue with broad-spectrum antibiotic.    3. Acute respiratory failure with hypoxia is evidenced by use of respiratory muscle, tachypnea and hypoxia requiring oxygen.  Currently on 2 L oxygen to maintain oxygen saturation more than 90%.  She is not on oxygen at home.    4.  CAD s/p stenting.  Continue aspirin and statin.    5.  GERD.  Continue PPI    DVT prophylaxis:Lovenox     Disposition: 2 to 3 days    Personally reviewed Lab Studies and Imaging      Medical Decision Making:  The following items were considered in medical decision making:  Discussion of patient care with other providers  Reviewed clinical lab tests  Reviewed radiology tests  Reviewed other diagnostic tests/interventions  Independent review of radiologic images  Microbiology cultures and other micro tests reviewed        Subjective:     Chief Complaint: Shortness of breath    HPI:Lalita Pimentel is a 78 y.o. female who presents with shortness of breath. She is still on oxygen and still have cough and wheezing. Somewhat improved since admission. Denies any fever or chills.      Review of Systems:    As above     Objective:     Intake/Output Summary (Last 24 hours) at 2024 0814  Last data filed at 2024 0351  Gross per 24 hour   Intake -- 
    V2.0  [facility] [place] Progress [place]      Name:  Lalita Pimentel /Age/Sex: 1945  (78 y.o. female)   MRN & CSN:  [id number] & [id number] Encounter Date/Time: 2024 1:16 PM EST    Location:  [id number]/4133-01 PCP: Belkis Silva, HECTOR - CNP       Hospital Day: 4    Assessment and Plan:   Lalita Pimentel is a 78 y.o. female with medical history significant for CAD s/p stenting, GERD, advanced COPD who was admitted with     Assessment/Plan :   1.  Acute exacerbation of COPD. CT chest with bullous emphysema.  Significant improvement in symptoms today. Continue nebs, systemic steroid, ICS/LABA.    2. Hemophilus Influenza Right-sided pneumonia. Continue antibiotics with cefepime.    3. Acute respiratory failure with hypoxia is evidenced by use of respiratory muscle, tachypnea and hypoxia requiring oxygen.  Currently on 2 L oxygen to maintain oxygen saturation more than 90%.  She is not on oxygen at home.  Continue to wean as tolerated    4.  CAD s/p stenting.  Continue aspirin and statin.    5.  GERD.  Continue PPI    DVT prophylaxis:Lovenox     Disposition: Potential discharge tomorrow if continuing to improve    Personally reviewed Lab Studies and Imaging      Medical Decision Making:  The following items were considered in medical decision making:  Discussion of patient care with other providers  Reviewed clinical lab tests  Reviewed radiology tests  Reviewed other diagnostic tests/interventions  Independent review of radiologic images  Microbiology cultures and other micro tests reviewed        Subjective:     Chief Complaint: Shortness of breath    HPI:Lalita Pimentel is a 78 y.o. female who presents with shortness of breath.  Significant improvement in shortness of breath today.  Having easier time expectorating. Denies any fever or chills.      Review of Systems:    As above     Objective:     Intake/Output Summary (Last 24 hours) at 2024 1032  Last data filed at 2024 2019  Gross per 24 hour 
    V2.0  [facility] [place] Daily Progress Note      Name:  Lalita Pimentel /Age/Sex: 1945  (78 y.o. female)   MRN & CSN:  [id number] & [id number] Encounter Date/Time: 2/15/2024 1:16 PM EST    Location:  [id number] PCP: Belkis Silva, HECTOR - CNP       Hospital Day: 2    Assessment and Plan:   Lalita Pimentel is a 78 y.o. female with medical history significant for CAD s/p stenting, GERD, advanced COPD who was admitted with     Assessment/Plan :   1.  Acute exacerbation of COPD.  CT chest with bullous emphysema.  Continue nebs, systemic steroid, ICS/LABA.    2.  Right-sided pneumonia on personal review of CT chest.    Check pneumonia panel, nasal MRSA swab, respiratory viral panel and procalcitonin.Agree with broad-spectrum antibiotic.    3. Acute respiratory failure with hypoxia is evidenced by use of respiratory muscle, tachypnea and hypoxia requiring oxygen.  Currently on 2 L oxygen to maintain oxygen saturation more than 90%.  She is not on oxygen at home.    4.  CAD s/p stenting.  Continue aspirin and statin.    5.  GERD.  Continue PPI      DVT prophylaxis:Lovenox     Disposition: 2 to 3 days    Personally reviewed Lab Studies and Imaging      Medical Decision Making:  The following items were considered in medical decision making:  Discussion of patient care with other providers  Reviewed clinical lab tests  Reviewed radiology tests  Reviewed other diagnostic tests/interventions  Independent review of radiologic images  Microbiology cultures and other micro tests reviewed        Subjective:     Chief Complaint: Shortness of breath    HPI:Lalita Pimentel is a 78 y.o. female who presents with shortness of breath.  She is still on oxygen and still have cough and wheezing.  Denies any fever or chills.         Review of Systems:    As above     Objective:     Intake/Output Summary (Last 24 hours) at 2/15/2024 1316  Last data filed at 2/15/2024 0643  Gross per 24 hour   Intake 483.78 ml   Output 400 ml   Net 
   02/19/24 0933   Resting (Room Air)   SpO2 91   During Walk (Room Air)   SpO2 91     Patient maintained SpO2 >88% at rest and with ambulation and does not qualify for home oxygen at this time.   
4 Eyes Skin Assessment     NAME:  Lalita Pimentel  YOB: 1945  MEDICAL RECORD NUMBER:  [id number]    The patient is being assessed for  Admission    I agree that at least one RN has performed a thorough Head to Toe Skin Assessment on the patient. ALL assessment sites listed below have been assessed.      Areas assessed by both nurses:    Head, Face, Ears, Shoulders, Back, Chest, Arms, Elbows, Hands, Sacrum. Buttock, Coccyx, Ischium, Legs. Feet and Heels, and Under Medical Devices         Does the Patient have a Wound? No noted wound(s)       Uriel Prevention initiated by RN: Yes  Wound Care Orders initiated by RN: No    Pressure Injury (Stage 3,4, Unstageable, DTI, NWPT, and Complex wounds) if present, place Wound referral order by RN under : No    New Ostomies, if present place, Ostomy referral order under : No     Nurse 1 eSignature: Electronically signed by Leslie Madsen RN on 2/14/24 at 6:34 PM EST    **SHARE this note so that the co-signing nurse can place an eSignature**    Nurse 2 eSignature: Electronically signed by Justin N. Schoenung, RN on 2/14/24 at 6:35 PM EST    
[facility] called to report 14 beats PAT on monitor . Patient asymptomatic. Notified hospitalist William Alas MD by secure message.     Electronically signed by Kristen Chua RN on 2/17/2024 at 9:51 AM   
Occupational Therapy  Lalita URBANO Gilrolly  1945  [id number]    OT orders received and pt chart reviewed. Per RN, pt has been up independently in room without any difficulty. OT/PT to pt's room to see for eval/tx and pt declining any therapy needs at this time. Pt has discharge orders to return home with assist prn. Will sign off from therapy at this time.     Electronically signed by KADEEM Aguilera on 2/19/2024 at 9:54 AM   
Patient and partner educated on discharge instruction. IV taken out. Tele box removed. No questions from family or patient. Discharges at 1145.  
Patient arrives to room 4133 via stretcher. Patient is alert and oriented to person, place and situation. Attempt to re-oriented patient to time. Respirations easy and even. Oxygen saturation 94% on 4 liters NC. Non productive cough noted. Oriented to room and call light. Bed alarm active. Call light in reach. Fall precautions in place.   
Physical Therapy  No eval/discharge  Lalita Pimentel  [id number]    PT orders received for this patient admitted with COPD exac and the flu. Per RN report, the pt has been up on her own in the room and walked in the hallways with respiratory. Spoke with the pt and she does not feel she needs a PT eval and denies concerns re: going home and being able to manage. Anticipate that the pt is safe for home. Will sign off.     Electronically signed by Amber Kahn, PT [id number] on 2/19/2024 at 9:54 AM      
Pt updated on POC, VU. Weaned to 2L, 94%.   
Pulmonary Progress Note    Date of Admission: 2/14/2024   LOS: 3 days     Chief Complaint   Patient presents with    Shortness of Breath     SOB while at pcp. But first experienced SOB on Saturday. Denies chest pain. Low 80's o2 in pcp       Assessment/Plan:     Acute on chronic hypoxemic and hypercapnic respiratory failure  -Wean oxygen goal saturation 90%  -Home O2 eval on discharge    Acute exacerbation of severe COPD  -DuoNebs  -IV Solu-Medrol  -Dulera    Pneumonia, haemophilus influenza and MSSA  -Cefepime        24 Hour Events/Subjective  No acute events overnight.  Down to 2 L of oxygen    ROS:   No nausea  No Vomiting  No chest pain      Intake/Output Summary (Last 24 hours) at 2/17/2024 1206  Last data filed at 2/16/2024 2019  Gross per 24 hour   Intake 240 ml   Output 100 ml   Net 140 ml         PHYSICAL EXAM:   Blood pressure (!) 157/71, pulse 91, temperature 98.3 °F (36.8 °C), temperature source Oral, resp. rate 16, height 1.626 m (5' 4\"), weight 63.2 kg (139 lb 5.3 oz), SpO2 92 %, not currently breastfeeding.'  Gen:  No acute distress.   Resp:  No crackles. No wheezes. No rhonchi. No dullness on percussion.  CV: Regular rate. Regular rhythm. No murmur or rub. No edema.   Neuro:  no focal neurologic deficit.  Moves all extremities  Psych: Awake and alert  Mood stable.      Labs reviewed:  CBC:   Recent Labs     02/14/24  1241 02/15/24  0523   WBC 12.0* 7.5   HGB 13.8 12.6   HCT 40.3 38.3   MCV 90.7 91.7    298     BMP:   Recent Labs     02/14/24  1241 02/15/24  0523   * 136   K 3.8 4.0   CL 94* 98*   CO2 26 26   BUN 29* 31*   CREATININE 1.4* 1.2     LIVER PROFILE: No results for input(s): \"AST\", \"ALT\", \"LIPASE\", \"AMYLASE\", \"ALB\", \"BILIDIR\", \"BILITOT\", \"ALKPHOS\" in the last 72 hours.  PT/INR: No results for input(s): \"PROTIME\", \"INR\" in the last 72 hours.        Films:  Radiology Review:  Pertinent images / reports were reviewed as a part of this visit.            This note was transcribed 
Pulmonary Progress Note    Date of Admission: 2/14/2024   LOS: 5 days     Chief Complaint   Patient presents with    Shortness of Breath     SOB while at pcp. But first experienced SOB on Saturday. Denies chest pain. Low 80's o2 in pcp       Assessment/Plan:     chronic hypercapnic respiratory failure  -Wean oxygen goal saturation 90%  -Tolerating room air during my visit, Home O2 eval on discharge     Acute exacerbation of severe COPD  -DuoNebs  -Prednisone daily  -Dulera     Pneumonia, haemophilus influenza and MSSA  -Augmentin    From pulmonary standpoint okay for discharge to complete antibiotic course at home.  Follow-up with Dr. Hung as below    Future Appointments   Date Time Provider Department Center   2/26/2024 11:15 AM Alonzo Cisneros MD [facility]   3/12/2024 10:30 AM Babatunde Leach MD [facility]   4/19/2024 11:00 AM Belkis Silva, APRN - CNP [facility] [place] [facility]   7/22/2024 11:00 AM Antelmo Garza MD [facility]         24 Hour Events/Subjective  No acute events overnight.  Tolerating room air.  ROS:   No nausea  No Vomiting  No chest pain    No intake or output data in the 24 hours ending 02/19/24 0840      PHYSICAL EXAM:   Blood pressure 132/70, pulse 92, temperature 98.9 °F (37.2 °C), temperature source Oral, resp. rate 18, height 1.626 m (5' 4\"), weight 63.7 kg (140 lb 6.9 oz), SpO2 91 %, not currently breastfeeding.'  Gen:  No acute distress.   Resp:  No crackles. No wheezes. No rhonchi. No dullness on percussion.  CV: Regular rate. Regular rhythm. No murmur or rub. No edema.   Neuro:  no focal neurologic deficit.  Moves all extremities  Psych: Awake and alert  Mood stable.      Labs reviewed:  CBC:   Recent Labs     02/19/24  0536   WBC 16.8*   HGB 12.5   HCT 38.2   MCV 90.6        BMP:   Recent Labs     02/19/24  0536      K 3.6   CL 99   CO2 29   BUN 15   CREATININE 0.8     LIVER PROFILE: No results for input(s): \"AST\", \"ALT\", \"LIPASE\", \"AMYLASE\", \"ALB\", 
Pulmonary, Critical Care, and Sleep Medicine    
    Imaging/Diagnostics Last 24 Hours   CT CHEST PULMONARY EMBOLISM W CONTRAST    Result Date: 2/14/2024  EXAMINATION: CTA OF THE CHEST 2/14/2024 2:23 pm TECHNIQUE: CTA of the chest was performed after the administration of intravenous contrast.  Multiplanar reformatted images are provided for review.  MIP images are provided for review. Automated exposure control, iterative reconstruction, and/or weight based adjustment of the mA/kV was utilized to reduce the radiation dose to as low as reasonably achievable. COMPARISON: 5 January 2024 HISTORY: ORDERING SYSTEM PROVIDED HISTORY: SOB TECHNOLOGIST PROVIDED HISTORY: Reason for exam:->SOB Additional Contrast?->1 Reason for Exam: sob FINDINGS: Pulmonary Arteries: Pulmonary arteries are adequately opacified for evaluation.  No evidence of intraluminal filling defect to suggest pulmonary embolism.  Main pulmonary artery is normal in caliber. Mediastinum: The heart is normal in size.  There are  a few scattered precarinal, AP window and subcarinal nodes are identified similar to the prior study. Lungs/pleura: There is volume loss on the right with elevation of the right hemidiaphragm.  There is extensive pleuroparenchymal changes in the right apex which is similar to the prior study.  This is a large cavitary lesion/bleb/loculated pneumothorax in the upper lung zone.  This is unchanged compared to the prior study.  Negative for lobar infiltrate, effusion or pneumothorax.  A few nodular densities in the left mid lung zone unchanged. Upper Abdomen: Limited images of the upper abdomen are unremarkable. Soft Tissues/Bones: Subacute 4th through 7th left anterior rib fractures with periosteal reaction and healing.     No pulmonary embolism. No acute cardiopulmonary process.  Multiple findings as detailed above which appears little changed, compared to the prior study.     XR CHEST 1 VIEW    Result Date: 2/14/2024  EXAMINATION: ONE XRAY VIEW OF THE CHEST 2/14/2024 12:24 pm

## 2024-02-19 NOTE — PLAN OF CARE
Problem: Discharge Planning  Goal: Discharge to home or other facility with appropriate resources  2/18/2024 1032 by Kristen Chua, RN  Outcome: Progressing    Flowsheets (Taken 2/18/2024 0559)  Discharge to home or other facility with appropriate resources:   Identify barriers to discharge with patient and caregiver   Identify discharge learning needs (meds, wound care, etc)   Refer to discharge planning if patient needs post-hospital services based on physician order or complex needs related to functional status, cognitive ability or social support system   Arrange for needed discharge resources and transportation as appropriate   Arrange for interpreters to assist at discharge as needed     Problem: Safety - Adult  Goal: Free from fall injury  2/18/2024 1032 by Kristen Chua, RN  Outcome: Progressing    Flowsheets (Taken 2/18/2024 0559)  Free From Fall Injury: Instruct family/caregiver on patient safety     Problem: Pain  Goal: Verbalizes/displays adequate comfort level or baseline comfort level  2/18/2024 1032 by Kristen Chua RN  Outcome: Progressing    Flowsheets (Taken 2/18/2024 0559)  Verbalizes/displays adequate comfort level or baseline comfort level:   Assess pain using appropriate pain scale   Encourage patient to monitor pain and request assistance   Implement non-pharmacological measures as appropriate and evaluate response   Administer analgesics based on type and severity of pain and evaluate response   Consider cultural and social influences on pain and pain management   Notify Licensed Independent Practitioner if interventions unsuccessful or patient reports new pain     Problem: Respiratory - Adult  Goal: Achieves optimal ventilation and oxygenation    Flowsheets (Taken 2/18/2024 0559)  Achieves optimal ventilation and oxygenation:   Assess for changes in respiratory status   Position to facilitate oxygenation and minimize respiratory effort   Initiate smoking cessation protocol as 
  Problem: Discharge Planning  Goal: Discharge to home or other facility with appropriate resources  Outcome: Progressing     Problem: Safety - Adult  Goal: Free from fall injury  Outcome: Progressing     Problem: Pain  Goal: Verbalizes/displays adequate comfort level or baseline comfort level  Outcome: Progressing     Problem: Neurosensory - Adult  Goal: Achieves stable or improved neurological status  Outcome: Progressing     Problem: Skin/Tissue Integrity - Adult  Goal: Skin integrity remains intact  Outcome: Progressing     Problem: Infection - Adult  Goal: Absence of infection at discharge  Outcome: Progressing     Problem: Metabolic/Fluid and Electrolytes - Adult  Goal: Electrolytes maintained within normal limits  Outcome: Progressing     
  Problem: Discharge Planning  Goal: Discharge to home or other facility with appropriate resources  Outcome: Progressing  Flowsheets (Taken 2/14/2024 2006)  Discharge to home or other facility with appropriate resources:   Identify barriers to discharge with patient and caregiver   Arrange for needed discharge resources and transportation as appropriate     Problem: Safety - Adult  Goal: Free from fall injury  Outcome: Progressing     Problem: Pain  Goal: Verbalizes/displays adequate comfort level or baseline comfort level  Outcome: Progressing     Problem: Neurosensory - Adult  Goal: Achieves stable or improved neurological status  Outcome: Progressing  Flowsheets (Taken 2/14/2024 2006)  Achieves stable or improved neurological status: Assess for and report changes in neurological status  Goal: Achieves maximal functionality and self care  Outcome: Progressing  Flowsheets (Taken 2/14/2024 2006)  Achieves maximal functionality and self care: Encourage and assist patient to increase activity and self care with guidance from physical therapy/occupational therapy     Problem: Respiratory - Adult  Goal: Achieves optimal ventilation and oxygenation  Outcome: Progressing  Flowsheets (Taken 2/14/2024 2006)  Achieves optimal ventilation and oxygenation:   Assess for changes in respiratory status   Assess for changes in mentation and behavior   Position to facilitate oxygenation and minimize respiratory effort   Oxygen supplementation based on oxygen saturation or arterial blood gases   Encourage broncho-pulmonary hygiene including cough, deep breathe, incentive spirometry   Assess and instruct to report shortness of breath or any respiratory difficulty   Respiratory therapy support as indicated     Problem: Skin/Tissue Integrity - Adult  Goal: Skin integrity remains intact  Outcome: Progressing  Flowsheets (Taken 2/14/2024 2006)  Skin Integrity Remains Intact:   Monitor for areas of redness and/or skin breakdown   Assess 
  Problem: Discharge Planning  Goal: Discharge to home or other facility with appropriate resources  Outcome: Progressing  Flowsheets (Taken 2/18/2024 0559)  Discharge to home or other facility with appropriate resources:   Identify barriers to discharge with patient and caregiver   Identify discharge learning needs (meds, wound care, etc)   Refer to discharge planning if patient needs post-hospital services based on physician order or complex needs related to functional status, cognitive ability or social support system   Arrange for needed discharge resources and transportation as appropriate   Arrange for interpreters to assist at discharge as needed     Problem: Pain  Goal: Verbalizes/displays adequate comfort level or baseline comfort level  Outcome: Progressing  Flowsheets (Taken 2/18/2024 0559)  Verbalizes/displays adequate comfort level or baseline comfort level:   Assess pain using appropriate pain scale   Encourage patient to monitor pain and request assistance   Implement non-pharmacological measures as appropriate and evaluate response   Administer analgesics based on type and severity of pain and evaluate response   Consider cultural and social influences on pain and pain management   Notify Licensed Independent Practitioner if interventions unsuccessful or patient reports new pain     Problem: Musculoskeletal - Adult  Goal: Return mobility to safest level of function  Outcome: Progressing  Flowsheets (Taken 2/18/2024 0559)  Return Mobility to Safest Level of Function:   Assess patient stability and activity tolerance for standing, transferring and ambulating with or without assistive devices   Ensure adequate protection for wounds/incisions during mobilization   Assist with transfers and ambulation using safe patient handling equipment as needed   Obtain physical therapy/occupational therapy consults as needed   Apply continuous passive motion per provider or physical therapy orders to increase flexion 
  Problem: Discharge Planning  Goal: Discharge to home or other facility with appropriate resources  Outcome: Progressing  Flowsheets (Taken 2/18/2024 0559)  Discharge to home or other facility with appropriate resources:   Identify barriers to discharge with patient and caregiver   Identify discharge learning needs (meds, wound care, etc)   Refer to discharge planning if patient needs post-hospital services based on physician order or complex needs related to functional status, cognitive ability or social support system   Arrange for needed discharge resources and transportation as appropriate   Arrange for interpreters to assist at discharge as needed     Problem: Safety - Adult  Goal: Free from fall injury  Outcome: Progressing  Flowsheets (Taken 2/18/2024 0559)  Free From Fall Injury: Instruct family/caregiver on patient safety     Problem: Pain  Goal: Verbalizes/displays adequate comfort level or baseline comfort level  Outcome: Progressing  Flowsheets (Taken 2/18/2024 0559)  Verbalizes/displays adequate comfort level or baseline comfort level:   Assess pain using appropriate pain scale   Encourage patient to monitor pain and request assistance   Implement non-pharmacological measures as appropriate and evaluate response   Administer analgesics based on type and severity of pain and evaluate response   Consider cultural and social influences on pain and pain management   Notify Licensed Independent Practitioner if interventions unsuccessful or patient reports new pain     Problem: Respiratory - Adult  Goal: Achieves optimal ventilation and oxygenation  Outcome: Progressing  Flowsheets (Taken 2/18/2024 0559)  Achieves optimal ventilation and oxygenation:   Assess for changes in respiratory status   Position to facilitate oxygenation and minimize respiratory effort   Initiate smoking cessation protocol as indicated   Assess for changes in mentation and behavior   Oxygen supplementation based on oxygen saturation 
Pt in bed A&O x4. Pt states some shortness of breath on exertion. Pt with wheezes. Call light in reach. Pt denies any further needs Electronically signed by Justin N. Schoenung, RN on 2/16/2024 at 11:03 AM      Problem: Safety - Adult  Goal: Free from fall injury  Outcome: Progressing  Note: Fall precautions in place. Bed in lowest position, wheels locked, call light in reach, non slip socks on, alarm armed. Pt educated on using call light for assistance. Pt agreeable.      Problem: Pain  Goal: Verbalizes/displays adequate comfort level or baseline comfort level  Outcome: Progressing  Note: Pt educated on using pain scale. Pt given PRN pain medication per Dr orders see emar. Pt agreeable to pain management.      Problem: Respiratory - Adult  Goal: Achieves optimal ventilation and oxygenation  Outcome: Progressing  Note: Pt encouraged to cough and deep breathe and use acapella. Pt agreeable. Will wean O2 as tolerates.      
remains intact  Outcome: Progressing     Problem: Musculoskeletal - Adult  Goal: Return mobility to safest level of function  2/19/2024 1006 by Jacqueline Zelaya, RN  Outcome: Progressing  2/19/2024 0332 by Roscoe Rivera RN  Outcome: Progressing  Flowsheets (Taken 2/18/2024 4979)  Return Mobility to Safest Level of Function:   Assess patient stability and activity tolerance for standing, transferring and ambulating with or without assistive devices   Ensure adequate protection for wounds/incisions during mobilization   Assist with transfers and ambulation using safe patient handling equipment as needed   Obtain physical therapy/occupational therapy consults as needed   Apply continuous passive motion per provider or physical therapy orders to increase flexion toward goal  Goal: Return ADL status to a safe level of function  Outcome: Progressing     Problem: Genitourinary - Adult  Goal: Absence of urinary retention  Outcome: Progressing     Problem: Infection - Adult  Goal: Absence of infection at discharge  Outcome: Progressing     Problem: Metabolic/Fluid and Electrolytes - Adult  Goal: Electrolytes maintained within normal limits  Outcome: Progressing     
safest level of function  2/15/2024 1006 by Dipti Cao, RN  Outcome: Progressing     Problem: Infection - Adult  Goal: Absence of infection at discharge  2/15/2024 1006 by Dipti Cao RN  Outcome: Progressing  Flowsheets (Taken 2/15/2024 1006)  Absence of infection at discharge:   Monitor lab/diagnostic results   Assess and monitor for signs and symptoms of infection

## 2024-02-19 NOTE — CARE COORDINATION
2/17 Plan: Return to home with BF. On Oxygen at 3L/M (non-dep).(Must use [employer] if needs home O2). IV Solumedrol. Electronically signed by Cydney Lomax RN on 2/16/2024 at 5:00 PM    
2/19 Plan: Return to home with BF. Room Air now. FLU +. Follow. Electronically signed by Cydney Lomxa RN on 2/19/2024 at 10:19 AM    
DISCHARGE SUMMARY     DATE OF DISCHARGE: 2/19/24    DISCHARGE DESTINATION: Home with Boyfriend    HOME CARE: No    HEMODIALYSIS: No    TRANSPORTATION: Private Car    NEW DME ORDERED: no    COMMENTS: AVS reviewed/no discharge needs are noted.Electronically signed by Cydney Lomax RN on 2/19/2024 at 10:22 AM     
members/significant others, and if so, who? Yes (Ray if needed.)  Plans to Return to Present Housing: Yes  Other Identified Issues/Barriers to RETURNING to current housing: None noted at this time.   Potential Assistance needed at discharge: Durable Medical Equipment            Potential DME: Oxygen Therapy (Comment)  Patient expects to discharge to: Apartment  Plan for transportation at discharge: Family    Financial  Payor: [employer] MEDICARE / Plan: [employer] CHOICE-PPO MEDICARE / Product Type: *No Product type* /     Does insurance require precert for SNF: Yes    Potential assistance Purchasing Medications: No  Meds-to-Beds request:        [id number] - [place], [place] - [address] [facility]. - P [phone] - F [phone] [address] [facility] [address]  Phone: [phone] Fax: [phone]      Notes:    Factors facilitating achievement of predicted outcomes: Family support, Cooperative, Pleasant, Good insight into deficits, Has needed Durable Medical Equipment at home, and Knowledge about rehab    Barriers to discharge: None noted at this time.     Additional Case Management Notes:   1) Waiting on pulmonology clearance and possible home oxygen needs   2) Discharge to home with family.     The Plan for Transition of Care is related to the following treatment goals of Elevated troponin [R79.89]  COPD exacerbation (HCC) [J44.1]  Acute respiratory failure with hypoxia and hypercapnia (HCC) [J96.01, J96.02]    The Patient and/or Patient Representative Agree with the Discharge Plan?  Unsure, as the plan remains in process.    Respectfully submitted,    RENALDO Abernathy  [facility]   [phone]    Electronically signed by ULISES Argueta, PABLITO on 2/14/2024 at 5:59 PM

## 2024-02-19 NOTE — DISCHARGE SUMMARY
V2.0  Discharge Summary    Name:  Lalita Pimentel /Age/Sex: 1945 (78 y.o. female)   Admit Date: 2024  Discharge Date: 24    MRN & CSN:  [id number] & [id number] Encounter Date and Time 24 2:34 PM EST    Attending:  No att. providers found Discharging Provider: Artie Brush MD       Hospital Course:     Brief HPI: Lalita Pimentel is a 78 y.o. female with medical history significant for CAD s/p stenting, GERD, advanced COPD who was admitted with      Assessment/Plan :   1.  Acute exacerbation of COPD. CT chest with bullous emphysema.  Significant improvement in symptoms today. Continue nebs, systemic steroid, ICS/LABA.  Will prescribe 3 more days of oral prednisone to complete the course.  She will follow-up with Dr. Garza as outpatient.     2. Hemophilus Influenza Right-sided pneumonia.  Patient was transition to oral Augmentin by pulmonary on 2023.  Seen by pulmonary and recommended to continue 5 more days of Augmentin on discharge.     3. Acute respiratory failure with hypoxia is evidenced by use of respiratory muscle, tachypnea and hypoxia requiring oxygen.  She  was successfully weaned off oxygen since 2024.   She is not on oxygen at home.  Continue to wean as tolerated     4.  CAD s/p stenting.  Continue aspirin and statin.     5.  GERD.  Continue PPI    The patient expressed appropriate understanding of, and agreement with the discharge recommendations, medications, and plan.     Consults this admission:  IP CONSULT TO PULMONOLOGY    Discharge Diagnosis:   COPD exacerbation (HCC)  Haemophilus influenza right-sided pneumonia  Acute hypoxic respiratory failure    Discharge Instruction:   Follow up appointments: Pulmonary  Primary care physician: Belkis Silva, HECTOR - CNP within 2 weeks  Diet: low fat, low cholesterol diet   Activity: activity as tolerated  Disposition: Discharged to:   [x]Home, []HHC, []SNF, []Acute Rehab, []Hospice   Condition on discharge:

## 2024-02-20 ENCOUNTER — CARE COORDINATION (OUTPATIENT)
Dept: CASE MANAGEMENT | Age: 79
End: 2024-02-20

## 2024-02-20 NOTE — CARE COORDINATION
Care Transitions Initial Follow Up Call    Call within 2 business days of discharge: Yes    Patient: Lalita Pimentel Patient : 1945   MRN: [id number]  Reason for Admission: COPD  Discharge Date: 24 RARS: Readmission Risk Score: 10.7      Last Discharge Facility       Date Complaint Diagnosis Description Type Department Provider    24 Shortness of Breath Acute respiratory failure with hypoxia and hypercapnia (HCC) ... ED to Hosp-Admission (Discharged) (ADMITTED) [facility] Artie Brush MD; Nitin Knight...            Was this an external facility discharge? No Discharge Facility: [facility] initial 24 hr follow up outreach call attempt, no answer.  CTN left  with contact information and request for return call.  CTN will continue with outreach call attempts.    Follow Up  Future Appointments   Date Time Provider Department Center   2024 11:15 AM Alonzo Cisneros MD [facility]   3/12/2024 10:30 AM Babatunde Leach MD [facility]   3/12/2024  2:20 PM Antelmo Garza MD [facility]   2024 11:00 AM Belkis Silva, APRN - CNP [facility]   2024 11:00 AM Antelmo Garza MD [facility]       DIANA Pepper, RN   Care Transition Nurse  Mobile: [phone]

## 2024-02-21 ENCOUNTER — CARE COORDINATION (OUTPATIENT)
Dept: PRIMARY CARE CLINIC | Age: 79
End: 2024-02-21

## 2024-02-21 ENCOUNTER — CARE COORDINATION (OUTPATIENT)
Dept: CASE MANAGEMENT | Age: 79
End: 2024-02-21

## 2024-02-21 DIAGNOSIS — J44.9 COPD, SEVERE (HCC): Primary | ICD-10-CM

## 2024-02-21 DIAGNOSIS — J96.01 ACUTE RESPIRATORY FAILURE WITH HYPOXIA (HCC): Primary | ICD-10-CM

## 2024-02-21 PROCEDURE — 1111F DSCHRG MED/CURRENT MED MERGE: CPT | Performed by: NURSE PRACTITIONER

## 2024-02-21 NOTE — CARE COORDINATION
Remote Patient Monitoring Enrollment Note     Date/Time:      2/21/2024 12:54 PM     Offered patient enrollment in the [facility] Remote Patient Monitoring (RPM) program for in home monitoring for COPD; condition managed by Dr Garza.  Patient accepted.     Patient will be monitoring the following daily:  Medication and Pulse ox     CTN reviewed the information below with the patient:     Emergency Contact (name and contact number): Partner/ significant other - Ray Weartz number unavilable at this time.   Alternative Domi Burr [phone] - daughter     [x]  A member from the care coordination team will reach out to notify the patient once the RPM kit is ordered.  [x]  Once the kit is delivered, the HRS team will contact the patient after UPS delivers to assist with set up.  [x]  Determined BP cuff size: regular (9.05\"-15.74\")  [x]  Determined weight scale: regular (<330lbs)  [x]  Hours of ACM monitoring - Monday-Friday [id number]  [x]  It is important to take your vitals every day, even on the weekends, to keep your care team aware of how you are doing every day of the week.     All questions about RPM program answered at this time.

## 2024-02-21 NOTE — CARE COORDINATION
Care Transitions Initial Follow Up Call    Call within 2 business days of discharge: Yes    Patient Current Location:  Home: [address] [place] [address] 3  [facility] [address]    Care Transition Nurse contacted the patient by telephone to perform post hospital discharge assessment. Verified name and  with patient as identifiers. Provided introduction to self, and explanation of the Care Transition Nurse role.     Patient: Lalita Pimentel Patient : 1945   MRN: [id number]  Reason for Admission: COPD exacerbation   Discharge Date: 24 RARS: Readmission Risk Score: 10.7      Last Discharge Facility       Date Complaint Diagnosis Description Type Department Provider    24 Shortness of Breath Acute respiratory failure with hypoxia and hypercapnia (HCC) ... ED to Hosp-Admission (Discharged) (ADMITTED) [facility] Artie Brush MD; Nitin Knight...            Was this an external facility discharge? No Discharge Facility: [facility]    Challenges to be reviewed by the provider   Additional needs identified to be addressed with provider: No  none               Method of communication with provider: none.    Spoke with pt who states she is doing well. States she has very little cough and minimal phlegm. States she has no SOB or cp. Able to  new meds from pharmacy without issues. Denies bowel or bladder issues. States she gets around her home without any DME. Pt states she has a pulse ox and has checked oxygen a few times since discharge. Oxygen sitting around 91%. Reviewed COPD zones, and reminded to consistently take meds as ordered. Neb ordered 4 times daily but pt takes PRN. Taking inhaler daily. Has rescue inhaler if needed. Reminded pt to make a HFU with PCP. Pt has an appt with Pulmonology 3/12. Pt significant other/Partner will be going with her.     Care Transition Nurse reviewed discharge instructions with patient who verbalized understanding. The patient was given an opportunity to ask questions

## 2024-02-21 NOTE — CARE COORDINATION
RPM Kit Order    Remote Patient Kit Ordering Note      Date/Time:  2/21/2024 2:09 PM      [facility] placed phone call to patient/family today to notify of RPM kit order; patient/family was available; discussed the following topics below and all questions answered.    [x] [facility] confirmed patient shipping address  [x] Patient will receive package over the next 1-3 business days. Someone 21 years or older must be present to sign for UPS delivery.  [x] HRS will contact patient within 24 hours, an HRS  will call the patient directly: If the patient does not answer, HRS will follow up with the clinical team notifying them about the unsuccessful attempt to contact the patient. HRS will make three call attempts to the patient.Provide patient with [facility] Virtual install number is: [phone].  [x] CTN will contact patient once equipment is active to welcome them to the program.                                                         [x] Hours of RPM monitoring - Monday-Friday [id number]; encourage patient to get vitals entered by Noon each day to have the alert addressed same day.  [[facility].                      CTN made aware the RPM kit has been ordered.

## 2024-02-21 NOTE — PROGRESS NOTES
Remote Patient Monitoring Treatment Plan    Received request from ACM/Lindy Barrow, RN  to order remote patient monitoring for in home monitoring of COPD; Condition managed by Dr. Antelmo Garza ([facility] Pulmonology, Sleep and Critical Care).  and order completed.     Patient will be monitoring pulse ox   survey questions.      Patient will engage in Remote Patient Monitoring each day to develop the skills necessary for self management.       RPM Care Team Responsibilities:   Alerts will be reviewed daily and addressed within 2-4 hours during operational hours (Monday -Friday 9 am-4 pm)  Alert response and intervention documented in patient medical record  Alert response escalated to PCP per protocol and documented in patient medical record  Patient monitored over approximately  days  Discharge from program based on self-management readiness    See care coordination encounters for additional details.      Printed and placed on MD's desk

## 2024-02-23 NOTE — PROGRESS NOTES
D 500-500 MG-UNIT CHEW Take 1 tablet by mouth 2 times daily      ONE TOUCH LANCETS MISC 1 each by Does not apply route daily 100 each 3    Cholecalciferol (VITAMIN D) 2000 units TABS tablet Take 1 tablet by mouth daily 30 tablet     cetirizine (ZYRTEC) 10 MG tablet Take 1 tablet by mouth daily as needed for Allergies      Nebulizers (COMPRESSOR/NEBULIZER) MISC 1 Units by Does not apply route 4 times daily 1 each 3     No current facility-administered medications on file prior to visit.     Prior cardiac testing:    EK20 SR, ~67 bpm   22 Sinus  Tachycardia. Incomplete right bundle branch block. Nonspecific T-abnormality.   [facility] 22  HEMODYNAMICS:  Aortic pressure was 140/80;  LVEDP 16.  There was no gradient between the left ventricle and aorta.     ANGIOGRAM/CORONARY ARTERIOGRAM:     The left main coronary artery is normal .  The left anterior descending artery has mild diffuse disease .  The left circumflex artery is normal              OM1 99% .   The right coronary artery is normal .     SUMMARY:   Single vessel CAD      2022  Selective angiography with PCI:  ANGIOGRAM/CORONARY ARTERIOGRAM:        The left circumflex artery has mild disease              Om1 99%    INTERVENTION  Guide catheter: XB 3.5 Guide  Runthrough wire used to cross OM1 lesion  Predilation with 3.0 regular balllon  IVUS passed to distal OM, reference diameter 3 mm  Haily 3.0 X 15 mm KIRILL  Post dilation with 3.0 NC balloon to 15 ALEXEI      SUMMARY:   Single vessel CAD  S/p successful IVUS guided PCI OM1 X 1 KIRILL      Echo 2022   Left ventricular cavity size is normal with mild LV hypertrophy and normal systolic function.  Ejection fraction is visually estimated to be 55-60%.  There are no regional wall motion abnormalities noted.  Grade I diastolic dysfunction noted.  The right ventricle is normal in size and function.  There are no significant valvular abnormalities appreciated in this study.  Assessment and Plan   1)

## 2024-02-26 ENCOUNTER — CARE COORDINATION (OUTPATIENT)
Dept: CASE MANAGEMENT | Age: 79
End: 2024-02-26

## 2024-02-26 ENCOUNTER — OFFICE VISIT (OUTPATIENT)
Dept: CARDIOLOGY CLINIC | Age: 79
End: 2024-02-26
Payer: MEDICARE

## 2024-02-26 VITALS
WEIGHT: 143 LBS | SYSTOLIC BLOOD PRESSURE: 112 MMHG | OXYGEN SATURATION: 93 % | HEART RATE: 70 BPM | BODY MASS INDEX: 24.55 KG/M2 | DIASTOLIC BLOOD PRESSURE: 64 MMHG

## 2024-02-26 DIAGNOSIS — R09.89 BRUIT OF RIGHT CAROTID ARTERY: ICD-10-CM

## 2024-02-26 DIAGNOSIS — I25.10 CORONARY ARTERY DISEASE INVOLVING NATIVE CORONARY ARTERY OF NATIVE HEART WITHOUT ANGINA PECTORIS: Primary | ICD-10-CM

## 2024-02-26 DIAGNOSIS — R09.89 BILATERAL CAROTID BRUITS: ICD-10-CM

## 2024-02-26 PROCEDURE — 1036F TOBACCO NON-USER: CPT | Performed by: INTERNAL MEDICINE

## 2024-02-26 PROCEDURE — G8420 CALC BMI NORM PARAMETERS: HCPCS | Performed by: INTERNAL MEDICINE

## 2024-02-26 PROCEDURE — 99214 OFFICE O/P EST MOD 30 MIN: CPT | Performed by: INTERNAL MEDICINE

## 2024-02-26 PROCEDURE — 1111F DSCHRG MED/CURRENT MED MERGE: CPT | Performed by: INTERNAL MEDICINE

## 2024-02-26 PROCEDURE — 93000 ELECTROCARDIOGRAM COMPLETE: CPT | Performed by: INTERNAL MEDICINE

## 2024-02-26 PROCEDURE — 1123F ACP DISCUSS/DSCN MKR DOCD: CPT | Performed by: INTERNAL MEDICINE

## 2024-02-26 PROCEDURE — 1090F PRES/ABSN URINE INCON ASSESS: CPT | Performed by: INTERNAL MEDICINE

## 2024-02-26 PROCEDURE — G8484 FLU IMMUNIZE NO ADMIN: HCPCS | Performed by: INTERNAL MEDICINE

## 2024-02-26 PROCEDURE — G8427 DOCREV CUR MEDS BY ELIG CLIN: HCPCS | Performed by: INTERNAL MEDICINE

## 2024-02-26 PROCEDURE — G8399 PT W/DXA RESULTS DOCUMENT: HCPCS | Performed by: INTERNAL MEDICINE

## 2024-02-26 NOTE — TELEPHONE ENCOUNTER
Please help patient schedule hospital f/u appt  Needs to make sure to stay hydrated with water. If urinary symptoms worsen then needs urgent evaluation with me or ER.

## 2024-02-26 NOTE — CARE COORDINATION
Care Transitions Follow Up Call    Patient Current Location:  Home: [address] [place] [address] [address]  [facility] [id number]    [facility] Care Coordinator contacted the patient by telephone to follow up after admission on .  Verified name and  with patient as identifiers.    Patient: Lalita Pimentel  Patient : 1945   MRN: [id number]  Reason for Admission: COPD exacerbation   Discharge Date: 24 RARS: Readmission Risk Score: 10.7      Needs to be reviewed by the provider   Additional needs identified to be addressed with provider: Yes  Patient c/o having low volume urine and depression. Patient also need HFU scheduled. Writer attempted to schedule but no available appointment so writer did a 3 way call to PCP office.              Method of communication with provider: phone.    Spoke with Lalita Pimentel who reported that she was doing good today as far as her breathing. Patient stated that she even got out and did a little walking today. Patient stated that she is using inhalers and neb Tx as ordered. Patient c/o having low volume urine and some depression that is causing some concern. Patient denied any urine odor, irritation or abd pain. Patient also need HFU scheduled. Writer attempted to schedule but no available appointment so writer did a 3 way call to PCP office and got disconnected after being on hold. Patient denied cp, sob, cough, dizziness, headache, n/v, diarrhea, abdominal pains, fever, or chills. Patient report that appetite is good  and fluid intake is fair and denied any problems with bowel or bladder. Writer advised patient to increase fluid intake and she v/u.  Patient reported that she is taking all medications as ordered. Patient denied any other needs at this time. Patient instructed to continue to monitor s/s, reporting any that may present to MD immediately for early intervention.  Patient is agreeable to f/u calls.    Addressed changes since last contact:  none  Discussed follow-up

## 2024-02-27 ENCOUNTER — OFFICE VISIT (OUTPATIENT)
Dept: FAMILY MEDICINE CLINIC | Age: 79
End: 2024-02-27
Payer: MEDICARE

## 2024-02-27 ENCOUNTER — CARE COORDINATION (OUTPATIENT)
Dept: CASE MANAGEMENT | Age: 79
End: 2024-02-27

## 2024-02-27 ENCOUNTER — CARE COORDINATION (OUTPATIENT)
Dept: CARE COORDINATION | Age: 79
End: 2024-02-27

## 2024-02-27 VITALS
DIASTOLIC BLOOD PRESSURE: 70 MMHG | WEIGHT: 144.8 LBS | HEART RATE: 70 BPM | OXYGEN SATURATION: 92 % | BODY MASS INDEX: 24.72 KG/M2 | RESPIRATION RATE: 22 BRPM | TEMPERATURE: 98.6 F | SYSTOLIC BLOOD PRESSURE: 122 MMHG | HEIGHT: 64 IN

## 2024-02-27 DIAGNOSIS — J44.1 COPD EXACERBATION (HCC): ICD-10-CM

## 2024-02-27 DIAGNOSIS — J14: ICD-10-CM

## 2024-02-27 DIAGNOSIS — Z09 HOSPITAL DISCHARGE FOLLOW-UP: Primary | ICD-10-CM

## 2024-02-27 DIAGNOSIS — F33.1 MODERATE EPISODE OF RECURRENT MAJOR DEPRESSIVE DISORDER (HCC): ICD-10-CM

## 2024-02-27 PROCEDURE — 99214 OFFICE O/P EST MOD 30 MIN: CPT | Performed by: NURSE PRACTITIONER

## 2024-02-27 PROCEDURE — 1111F DSCHRG MED/CURRENT MED MERGE: CPT | Performed by: NURSE PRACTITIONER

## 2024-02-27 PROCEDURE — G8420 CALC BMI NORM PARAMETERS: HCPCS | Performed by: NURSE PRACTITIONER

## 2024-02-27 PROCEDURE — 3023F SPIROM DOC REV: CPT | Performed by: NURSE PRACTITIONER

## 2024-02-27 PROCEDURE — G8399 PT W/DXA RESULTS DOCUMENT: HCPCS | Performed by: NURSE PRACTITIONER

## 2024-02-27 PROCEDURE — 1090F PRES/ABSN URINE INCON ASSESS: CPT | Performed by: NURSE PRACTITIONER

## 2024-02-27 PROCEDURE — 1036F TOBACCO NON-USER: CPT | Performed by: NURSE PRACTITIONER

## 2024-02-27 PROCEDURE — 1123F ACP DISCUSS/DSCN MKR DOCD: CPT | Performed by: NURSE PRACTITIONER

## 2024-02-27 PROCEDURE — G8484 FLU IMMUNIZE NO ADMIN: HCPCS | Performed by: NURSE PRACTITIONER

## 2024-02-27 PROCEDURE — G8427 DOCREV CUR MEDS BY ELIG CLIN: HCPCS | Performed by: NURSE PRACTITIONER

## 2024-02-27 RX ORDER — DULOXETIN HYDROCHLORIDE 30 MG/1
30 CAPSULE, DELAYED RELEASE ORAL EVERY EVENING
Qty: 30 CAPSULE | Refills: 1 | Status: SHIPPED | OUTPATIENT
Start: 2024-02-27

## 2024-02-27 RX ORDER — PREDNISONE 20 MG/1
40 TABLET ORAL DAILY
Qty: 10 TABLET | Refills: 0 | Status: SHIPPED | OUTPATIENT
Start: 2024-02-27 | End: 2024-03-03

## 2024-02-27 ASSESSMENT — PATIENT HEALTH QUESTIONNAIRE - PHQ9
SUM OF ALL RESPONSES TO PHQ9 QUESTIONS 1 & 2: 2
1. LITTLE INTEREST OR PLEASURE IN DOING THINGS: 1
SUM OF ALL RESPONSES TO PHQ QUESTIONS 1-9: 9
SUM OF ALL RESPONSES TO PHQ QUESTIONS 1-9: 9
7. TROUBLE CONCENTRATING ON THINGS, SUCH AS READING THE NEWSPAPER OR WATCHING TELEVISION: 3
SUM OF ALL RESPONSES TO PHQ QUESTIONS 1-9: 9
5. POOR APPETITE OR OVEREATING: 0
10. IF YOU CHECKED OFF ANY PROBLEMS, HOW DIFFICULT HAVE THESE PROBLEMS MADE IT FOR YOU TO DO YOUR WORK, TAKE CARE OF THINGS AT HOME, OR GET ALONG WITH OTHER PEOPLE: 1
SUM OF ALL RESPONSES TO PHQ QUESTIONS 1-9: 9
6. FEELING BAD ABOUT YOURSELF - OR THAT YOU ARE A FAILURE OR HAVE LET YOURSELF OR YOUR FAMILY DOWN: 1
9. THOUGHTS THAT YOU WOULD BE BETTER OFF DEAD, OR OF HURTING YOURSELF: 0
3. TROUBLE FALLING OR STAYING ASLEEP: 0
4. FEELING TIRED OR HAVING LITTLE ENERGY: 3
2. FEELING DOWN, DEPRESSED OR HOPELESS: 1
8. MOVING OR SPEAKING SO SLOWLY THAT OTHER PEOPLE COULD HAVE NOTICED. OR THE OPPOSITE, BEING SO FIGETY OR RESTLESS THAT YOU HAVE BEEN MOVING AROUND A LOT MORE THAN USUAL: 0

## 2024-02-27 ASSESSMENT — ENCOUNTER SYMPTOMS
NAUSEA: 0
WHEEZING: 1
COUGH: 1
DIARRHEA: 0
SHORTNESS OF BREATH: 1
ABDOMINAL PAIN: 0
VOMITING: 0

## 2024-02-27 NOTE — PROGRESS NOTES
reveals wheezing. Examination of the left-upper field reveals wheezing. Examination of the right-middle field reveals wheezing. Examination of the left-middle field reveals wheezing. Examination of the right-lower field reveals decreased breath sounds. Examination of the left-lower field reveals decreased breath sounds. Decreased breath sounds and wheezing present.   Musculoskeletal:      Cervical back: Neck supple.      Right lower leg: No edema.      Left lower leg: No edema.   Skin:     General: Skin is warm and dry.   Neurological:      Mental Status: She is alert and oriented to person, place, and time.   Psychiatric:         Behavior: Behavior normal.         Thought Content: Thought content normal.         Judgment: Judgment normal.         An electronic signature was used to authenticate this note.  --Belkis Silva, HECTOR - CNP

## 2024-02-27 NOTE — CARE COORDINATION
Remote Patient Monitoring Note      Date/Time:  2/27/2024 12:47 PM  Patient Current Location: [facility] noted  red alert received for pulse ox reading (90%).   Background: Pt enrolled for COPD    Clinical Interventions:  noted pt saw her PCP today and o2 at OV was 92%. Recent illness and also pneumonia, PCP noted they would like her o2 to stay at 90% or above. Looking into getting patient o2 at home for prn use -- see note below , did not outreach to patient as she was seen in person today.    Breathing has improved, but continues with increased cough, shortness of breath. Will have her complete another 5 days of prednisone.   Continue breo inhaler daily  Continue albuterol inhaler or nebulizer PRN  Keep upcoming appt with Dr. Garza. She is going to discuss obtaining oxygen to use at home PRN. Currently keeping pulse ox above 90%.    Plan/Follow Up: Will continue to review, monitor and address alerts with follow up based on severity of symptoms and risk factors.

## 2024-02-27 NOTE — CARE COORDINATION
[facility] Care Transitions Follow Up Call    2024    Patient: Lalita Pimentel  Patient : 1945   MRN: [id number]  Reason for Admission: COPD exacerbation   Discharge Date: 24 RARS: Readmission Risk Score: 10.7         Spoke with: Lalita Pimentel who reported that she is doing good. Patient stated that HFU was today. She stated that patient advised her  to drink more fluid to help with low volume urine output and the also talked about some strategies to help with depression. Patient denied any other needs at this time. Patient instructed to continue to monitor s/s, reporting any that may present to MD immediately for early intervention.  Patient is agreeable to f/u calls.     Care Transitions Subsequent and Final Call    Subsequent and Final Calls  Do you have any ongoing symptoms?: No  Have your medications changed?: No  Do you have any questions related to your medications?: No  Do you currently have any active services?: No  Do you have any needs or concerns that I can assist you with?: No  Care Transitions Interventions  No Identified Needs  Other Interventions:             Follow Up  Future Appointments   Date Time Provider Department Center   3/7/2024  1:00 PM SCHEDULE, AZAEL OAKLEY CARD VASCULAR [facility] CAR [facility]   3/12/2024 10:30 AM Babatunde Leach MD [employer] [facility]   3/12/2024  2:20 PM Antelmo Garza MD  PULSOL [facility]   2024 11:00 AM Belkis Silva APRN - CNP [facility] [place] [facility]   2024 11:00 AM Antelmo Garza MD  PULM [facility]       Gracia Miner LPN

## 2024-02-28 ENCOUNTER — CARE COORDINATION (OUTPATIENT)
Dept: CASE MANAGEMENT | Age: 79
End: 2024-02-28

## 2024-02-28 NOTE — TELEPHONE ENCOUNTER
Ok to change alert parameters to call if pulse ox is below 90%. Would also need to notify her pulmonologist Dr. Garza.

## 2024-02-28 NOTE — CARE COORDINATION
Remote Alert Monitoring Note  Rpm alert to be reviewed by the provider                   Date/Time:  2/28/2024 10:40 AM  Patient Current Location: [place]  LPN contacted pt in regards to RPM  red alert received for pulse ox reading (90%).   Background: Pt enrolled for COPD    Refer to 911 immediately if:  Patient unresponsive or unable to provide history  Change in cognition or sudden confusion  Patient unable to respond in complete sentences  Intense chest pain/tightness  Any concern for any clinical emergency  Red Alert: Provider response time of 1 hr required for any red alert requiring intervention  Yellow Alert: Provider response time of 3hr required for any escalated yellow alert       O2 Triage  Are you having any Chest Pain? no   Are you having any Shortness of Breath? no   Swelling in your hands or feet? no     Are you having any other health concerns or issues? no      Clinical Interventions:  LPN contacted pt in regards to RPM red alert for PO of 90%. Pt denied SOB/dyspnea and any other worrisome symptoms at this time. Pt states that her baseline is around 90% to 92% at this time. Pt rechecked, and updated PO is 92%.      Plan/Follow Up: Will continue to review, monitor and address alerts with follow up based on severity of symptoms and risk factors.          Sapna Turner LPN, [facility]  PH: [phone]    -- Current Patient Metrics ---- Pulseox: 92%, 93bpm Survey: C Note Created at: 02/28/2024 10:51 AM ET ---- Time-Spent: 10 minutes 0 seconds

## 2024-02-29 ENCOUNTER — CARE COORDINATION (OUTPATIENT)
Dept: CASE MANAGEMENT | Age: 79
End: 2024-02-29

## 2024-02-29 NOTE — CARE COORDINATION
Remote Patient Monitoring Welcome Note  Date/Time:  2024 4:43 PM  Patient Current Location: Home: [address] [place] [id number]  Verified patients name and  as identifiers.  Completed and confirmed the following:   Emergency Contact: Ray - significant other [phone]  [x] Patient received all RPM equipment (tablet, scale, blood pressure device and cuff, and pulse oximeter)  Cuff Size: regular (9.05\"-15.74\")    Weight Scale:  regular (<330lbs)                    [x] Instructed patient keep box for use when returning equipment                                                          [x] Reviewed Patient Welcome Letter with patient    [x]  Reviewed Consent Form  Copy of consent form in chart.                 [x] Reviewed expectations for patient and care team  Monitoring hours M-F 9-4pm  It is important to take your vitals every day, even on the weekends,to keep your care team aware of how you are doing every day of the week.  Completing monitoring by 12pm on  so that alerts can be responded to in the same day  Patient weighs self at same time every day (or after urinating and waking up)  Take blood pressure 1-2 hrs after medications   RPM team may have different phone area code (including [place], [place], [place] or [place])                              [x] Instructed patient to keep scale on flat surface                                                         [x] Instructed patient to keep tablet plugged in at all times                         [x] Instructed how to contact IT support ([phone])  [x] Provided Remote Patient Monitoring care  information                All questions answered at this time.   --

## 2024-03-01 ENCOUNTER — CARE COORDINATION (OUTPATIENT)
Dept: CASE MANAGEMENT | Age: 79
End: 2024-03-01

## 2024-03-01 NOTE — CARE COORDINATION
Remote Alert Monitoring Note  Rpm alert to be reviewed by the provider               Date/Time:  3/1/2024 10:30 AM  Patient Current Location: [place]  LPN contacted pt in regards to RPM  red alert received for pulse ox reading (90%).   Background: Pt enrolled for COPD     Refer to 911 immediately if:  Patient unresponsive or unable to provide history  Change in cognition or sudden confusion  Patient unable to respond in complete sentences  Intense chest pain/tightness  Any concern for any clinical emergency  Red Alert: Provider response time of 1 hr required for any red alert requiring intervention  Yellow Alert: Provider response time of 3hr required for any escalated yellow alert        O2 Triage  Are you having any Chest Pain? no   Are you having any Shortness of Breath? no   Swelling in your hands or feet? no      Are you having any other health concerns or issues? no      Clinical Interventions:  LPN contacted pt in regards to RPM red alert for PO of 88%. Pt denied SOB/dyspnea and any other worrisome symptoms at this time. Pt states that this pulse ox is not abnormal for her . Writer had pt recheck PO after sitting, and updated PO is 90%.        Plan/Follow Up: Will continue to review, monitor and address alerts with follow up based on severity of symptoms and risk factors.           Sapna Turner LPN, [facility]  PH: [phone]       Current Patient Metrics ---- Pulseox: 90%, 95bpm Survey: - Note Created at: 03/01/2024 10:33 AM ET ---- Time-Spent: 10 minutes 0 seconds

## 2024-03-05 ENCOUNTER — CARE COORDINATION (OUTPATIENT)
Dept: CARE COORDINATION | Age: 79
End: 2024-03-05

## 2024-03-05 NOTE — CARE COORDINATION
Noted RPM red alert for PO of 89%, when opening chart to call pt, noted the CTN tried to call minutes before and left VM for pt. Did not want to overwhelm her with calls, will f/u this afternoon. Pt has previously reported this is her baseline readings for oxygen level

## 2024-03-05 NOTE — CARE COORDINATION
Care Transitions Follow Up Call    Patient: Lalita Pimentel  Patient : 1945   MRN: [id number]  Reason for Admission: COPD  Discharge Date: 24 RARS: Readmission Risk Score: 10.7    Attempted to reach pt for follow up call. Left message requesting call back.      Follow Up  Future Appointments   Date Time Provider Department Center   3/7/2024  1:00 PM SCHEDULE, AZAEL OAKLEY CARD VASCULAR [facility]AEL SUBH[facility] CAR [facility]   3/12/2024 10:30 AM Babatunde Leach MD [place] [facility]   3/12/2024  2:20 PM Antelmo Garza MD  PULM [facility]   2024 11:00 AM Belkis Silva, APRN - CNP [facility] [place] - [facility]   2024 11:00 AM [facility]

## 2024-03-07 ENCOUNTER — HOSPITAL ENCOUNTER (OUTPATIENT)
Dept: CARDIOLOGY | Age: 79
Discharge: HOME | End: 2024-03-07
Payer: MEDICARE

## 2024-03-07 ENCOUNTER — CARE COORDINATION (OUTPATIENT)
Dept: CASE MANAGEMENT | Age: 79
End: 2024-03-07

## 2024-03-07 DIAGNOSIS — R09.89 BILATERAL CAROTID BRUITS: ICD-10-CM

## 2024-03-07 PROCEDURE — 93880 EXTRACRANIAL BILAT STUDY: CPT

## 2024-03-07 NOTE — CARE COORDINATION
Care Transitions Follow Up Call    Patient Current Location:  Home: [address] [place] [address] [address]  [facility] [address]    [facility] Care Coordinator contacted the patient by telephone to follow up after admission on 2024.  Verified name and  with patient as identifiers.    Patient: Lalita Pimentel  Patient : 1945   MRN: [id number]  Reason for Admission: COPD exacerbation   Discharge Date: 24 RARS: Readmission Risk Score: 10.7      Needs to be reviewed by the provider   Additional needs identified to be addressed with provider: No  none             Method of communication with provider: none.  Patient reports she is doing good. Denies sob, cough, fever, chills, nvd, pain, weakness or fatigue. Good appetite and fluid intake. No urinary or bowel issues. Pulse ox runs 90-95%. She doesn't check BS. No questions, needs or concerns voiced. Will continue to follow.      Addressed changes since last contact:  none  Discussed follow-up appointments. If no appointment was previously scheduled, appointment scheduling offered: No.   Is follow up appointment scheduled within 7 days of discharge? Yes.    Follow Up  Future Appointments   Date Time Provider Department Center   3/7/2024  1:00 PM SCHEDULE, AZAEL ADIN CARD VASCULAR [facility]   3/12/2024 10:30 AM Babatunde Leach MD [place] [facility]   3/12/2024  2:20 PM Antelmo Garza MD  PULM [facility]   2024 11:00 AM Belkis Silva, APRN - CNP [facility] [place] [facility]   2024 11:00 [facility]     External follow up appointment(s):     N Care Coordinator reviewed medical action plan and red flags with patient and discussed any barriers to care and/or understanding of plan of care after discharge. Discussed appropriate site of care based on symptoms and resources available to patient including: PCP  Specialist  Urgent care clinics  When to call 911. The patient agrees to contact the PCP office for questions related

## 2024-03-08 VITALS — OXYGEN SATURATION: 90 % | HEART RATE: 96 BPM

## 2024-03-12 ENCOUNTER — OFFICE VISIT (OUTPATIENT)
Dept: PAIN MANAGEMENT | Age: 79
End: 2024-03-12
Payer: MEDICARE

## 2024-03-12 ENCOUNTER — OFFICE VISIT (OUTPATIENT)
Dept: PULMONOLOGY | Age: 79
End: 2024-03-12
Payer: MEDICARE

## 2024-03-12 VITALS
HEIGHT: 64 IN | BODY MASS INDEX: 24.65 KG/M2 | OXYGEN SATURATION: 99 % | TEMPERATURE: 97.4 F | SYSTOLIC BLOOD PRESSURE: 106 MMHG | RESPIRATION RATE: 18 BRPM | HEART RATE: 88 BPM | WEIGHT: 144.4 LBS | DIASTOLIC BLOOD PRESSURE: 71 MMHG

## 2024-03-12 VITALS
OXYGEN SATURATION: 93 % | DIASTOLIC BLOOD PRESSURE: 71 MMHG | HEART RATE: 76 BPM | BODY MASS INDEX: 24.71 KG/M2 | WEIGHT: 144 LBS | SYSTOLIC BLOOD PRESSURE: 106 MMHG

## 2024-03-12 DIAGNOSIS — G89.4 CHRONIC PAIN SYNDROME: ICD-10-CM

## 2024-03-12 DIAGNOSIS — J44.9 COPD, SEVERE (HCC): Primary | ICD-10-CM

## 2024-03-12 DIAGNOSIS — M51.37 DEGENERATION OF LUMBAR OR LUMBOSACRAL INTERVERTEBRAL DISC: ICD-10-CM

## 2024-03-12 DIAGNOSIS — R91.1 PULMONARY NODULE SEEN ON IMAGING STUDY: ICD-10-CM

## 2024-03-12 DIAGNOSIS — M79.7 FIBROMYALGIA: ICD-10-CM

## 2024-03-12 DIAGNOSIS — M47.26 OSTEOARTHRITIS OF SPINE WITH RADICULOPATHY, LUMBAR REGION: ICD-10-CM

## 2024-03-12 DIAGNOSIS — M47.816 LUMBAR SPONDYLOSIS: ICD-10-CM

## 2024-03-12 DIAGNOSIS — M51.36 DDD (DEGENERATIVE DISC DISEASE), LUMBAR: ICD-10-CM

## 2024-03-12 DIAGNOSIS — M70.61 TROCHANTERIC BURSITIS OF RIGHT HIP: ICD-10-CM

## 2024-03-12 PROCEDURE — G8399 PT W/DXA RESULTS DOCUMENT: HCPCS | Performed by: INTERNAL MEDICINE

## 2024-03-12 PROCEDURE — 1090F PRES/ABSN URINE INCON ASSESS: CPT | Performed by: INTERNAL MEDICINE

## 2024-03-12 PROCEDURE — G8484 FLU IMMUNIZE NO ADMIN: HCPCS | Performed by: INTERNAL MEDICINE

## 2024-03-12 PROCEDURE — 99214 OFFICE O/P EST MOD 30 MIN: CPT | Performed by: INTERNAL MEDICINE

## 2024-03-12 PROCEDURE — G8420 CALC BMI NORM PARAMETERS: HCPCS | Performed by: INTERNAL MEDICINE

## 2024-03-12 PROCEDURE — 1111F DSCHRG MED/CURRENT MED MERGE: CPT | Performed by: INTERNAL MEDICINE

## 2024-03-12 PROCEDURE — 1036F TOBACCO NON-USER: CPT | Performed by: INTERNAL MEDICINE

## 2024-03-12 PROCEDURE — 1123F ACP DISCUSS/DSCN MKR DOCD: CPT | Performed by: INTERNAL MEDICINE

## 2024-03-12 PROCEDURE — G8427 DOCREV CUR MEDS BY ELIG CLIN: HCPCS | Performed by: INTERNAL MEDICINE

## 2024-03-12 PROCEDURE — 3023F SPIROM DOC REV: CPT | Performed by: INTERNAL MEDICINE

## 2024-03-12 PROCEDURE — 99213 OFFICE O/P EST LOW 20 MIN: CPT | Performed by: INTERNAL MEDICINE

## 2024-03-12 RX ORDER — ALBUTEROL SULFATE 90 UG/1
2 AEROSOL, METERED RESPIRATORY (INHALATION) EVERY 6 HOURS PRN
Qty: 18 G | Refills: 11 | Status: SHIPPED | OUTPATIENT
Start: 2024-03-12

## 2024-03-12 RX ORDER — TRAZODONE HYDROCHLORIDE 50 MG/1
50-100 TABLET ORAL NIGHTLY
Qty: 60 TABLET | Refills: 1 | Status: SHIPPED | OUTPATIENT
Start: 2024-03-12

## 2024-03-12 RX ORDER — GABAPENTIN 600 MG/1
600 TABLET ORAL 2 TIMES DAILY
Qty: 60 TABLET | Refills: 1 | Status: SHIPPED | OUTPATIENT
Start: 2024-03-12 | End: 2024-05-11

## 2024-03-12 RX ORDER — MELOXICAM 15 MG/1
TABLET ORAL
Qty: 30 TABLET | Refills: 0 | Status: SHIPPED | OUTPATIENT
Start: 2024-03-12 | End: 2024-03-19 | Stop reason: ALTCHOICE

## 2024-03-12 RX ORDER — OXYCODONE AND ACETAMINOPHEN 7.5; 325 MG/1; MG/1
1 TABLET ORAL EVERY 6 HOURS PRN
Qty: 100 TABLET | Refills: 0 | Status: SHIPPED | OUTPATIENT
Start: 2024-03-12 | End: 2024-04-09

## 2024-03-12 RX ORDER — PANTOPRAZOLE SODIUM 40 MG/1
40 TABLET, DELAYED RELEASE ORAL DAILY
Qty: 30 TABLET | Refills: 1 | Status: SHIPPED | OUTPATIENT
Start: 2024-03-12

## 2024-03-12 NOTE — PROGRESS NOTES
Lalita Pimentel  1945  [id number]    HISTORY OF PRESENT ILLNESS:  Ms. Pimentel is a 78 y.o. female returns for a follow up visit for multiple medical problems.  Her  presenting problems are   1. Chronic pain syndrome    2. Trochanteric bursitis of right hip    3. Degeneration of lumbar or lumbosacral intervertebral disc    4. DDD (degenerative disc disease), lumbar    5. Osteoarthritis of spine with radiculopathy, lumbar region    6. Lumbar spondylosis    7. Fibromyalgia    .    As per information/history obtained from the PADT(patient assessment and documentation tool) -  She complains of pain in the lower back with radiation to the buttocks and hips Right She rates the pain 5/10 and describes it as aching.  Pain is made worse by: walking, standing, sitting, lifting.  Current treatment regimen has helped relieve about 50% of the pain.  She denies side effects from the current pain regimen.   Patient reports that since last follow up visit the physical functioning is unchanged, family/social relationships are unchanged, mood is unchanged sleep patterns are unchanged.  Ms. Pimentel states that since starting the treatment with the current regimen the  overall functioning  in the above aspects is  better,Patient denies neurological bowel or bladder. Patient denies misusing/abusing her narcotic pain medications or using any illegal drugs.  There are No indicators for possible drug abuse, addiction or diversion problems, Upon obtaining the medical history from Ms. Pimentel regarding today's office visit for her presenting problems, patient states she has been doing fair. Ms. Pimentel states she was in the hospital recently for pneumonia, she states she is doing better now. She states she is using all her medications as before. Patient states the pain is worse in the back than the legs. She mentions she is using Mobic along with Percocet 3-4 per day. Patient denies any constipation symptoms.         ALLERGIES: Patients list

## 2024-03-12 NOTE — PROGRESS NOTES
This note was transcribed using Dragon Dictation software. Please disregard any translational errors.

## 2024-03-13 ENCOUNTER — CARE COORDINATION (OUTPATIENT)
Dept: CASE MANAGEMENT | Age: 79
End: 2024-03-13

## 2024-03-13 PROBLEM — J44.1 COPD EXACERBATION (HCC): Status: RESOLVED | Noted: 2024-02-14 | Resolved: 2024-03-13

## 2024-03-13 PROBLEM — A41.9 SEVERE SEPSIS (HCC): Status: RESOLVED | Noted: 2017-01-10 | Resolved: 2024-03-13

## 2024-03-13 PROBLEM — R65.20 SEVERE SEPSIS (HCC): Status: RESOLVED | Noted: 2017-01-10 | Resolved: 2024-03-13

## 2024-03-13 PROBLEM — J96.11 CHRONIC HYPOXEMIC RESPIRATORY FAILURE (HCC): Status: RESOLVED | Noted: 2017-02-24 | Resolved: 2024-03-13

## 2024-03-13 NOTE — CARE COORDINATION
Remote Alert Monitoring Note  Rpm alert to be reviewed by the provider                 Date/Time:  3/13/2024 10:25 AM  Patient Current Location: [place]  LPN contacted pt in regards to RPM  red alert received for pulse ox reading (90%).   Background: Pt enrolled for COPD     Refer to 911 immediately if:  Patient unresponsive or unable to provide history  Change in cognition or sudden confusion  Patient unable to respond in complete sentences  Intense chest pain/tightness  Any concern for any clinical emergency  Red Alert: Provider response time of 1 hr required for any red alert requiring intervention  Yellow Alert: Provider response time of 3hr required for any escalated yellow alert        O2 Triage  Are you having any Chest Pain? no   Are you having any Shortness of Breath? no   Swelling in your hands or feet? no      Are you having any other health concerns or issues? no      Clinical Interventions:  LPN contacted pt in regards to RPM red alert for PO of 88%. Pt denied SOB/dyspnea and any other worrisome symptoms at this time. Pt states that this pulse ox is not abnormal for her . Writer had pt recheck PO after sitting, and updated PO is 93%.        Plan/Follow Up: Will continue to review, monitor and address alerts with follow up based on severity of symptoms and risk factors.           Sapna Turner LPN, [facility]  PH: [phone]     Current Patient Metrics ---- Pulseox: 93%, 84bpm Survey: 0/5 Note Created at: 03/13/2024 10:27 AM ET ---- Time-Spent: 10 minutes 0 seconds

## 2024-03-13 NOTE — ASSESSMENT & PLAN NOTE
She is recovering from recent admission for pneumonia.  A CT reviewed.  Patient had a CT in January which demonstrated nodules resolved.  Patient was admitted in February with new consolidation/nodules.  Given the quick reappearance, this is consistent with infectious etiology.  Plan to follow-up lung cancer screening in 1 year.

## 2024-03-14 ENCOUNTER — CARE COORDINATION (OUTPATIENT)
Dept: CASE MANAGEMENT | Age: 79
End: 2024-03-14

## 2024-03-14 NOTE — CARE COORDINATION
Care Transitions Follow Up Call    Patient: Lalita Pimentel  Patient : 1945   MRN: [id number]  Reason for Admission: COPD, influenza and PNA  Discharge Date: 24 RARS: Readmission Risk Score: 10.7      Follow up outreach call attempt, no answer.  CTN left  with contact information and request for return call.  CTN will continue with outreach call attempts.    Follow Up  Future Appointments   Date Time Provider Department [facility]   2024 11:00 AM Babatunde Leach MD [facility]   2024 11:00 AM Belkis Silva, APRN - CNP [facility] [place] [facility]   2024 11:00 AM Antelmo Garza MD [facility]   2024  1:00 PM Antelmo Garza MD [facility]       ELIZABET PepperN, RN   Care Transition Nurse  Mobile: [phone]

## 2024-03-19 ENCOUNTER — APPOINTMENT (OUTPATIENT)
Dept: CT IMAGING | Age: 79
End: 2024-03-19
Payer: MEDICARE

## 2024-03-19 ENCOUNTER — HOSPITAL ENCOUNTER (EMERGENCY)
Age: 79
Discharge: HOME | End: 2024-03-19
Payer: MEDICARE

## 2024-03-19 ENCOUNTER — APPOINTMENT (OUTPATIENT)
Dept: GENERAL RADIOLOGY | Age: 79
End: 2024-03-19
Payer: MEDICARE

## 2024-03-19 VITALS
OXYGEN SATURATION: 96 % | SYSTOLIC BLOOD PRESSURE: 136 MMHG | WEIGHT: 138.89 LBS | HEART RATE: 80 BPM | TEMPERATURE: 97.8 F | DIASTOLIC BLOOD PRESSURE: 88 MMHG | BODY MASS INDEX: 23.84 KG/M2 | RESPIRATION RATE: 16 BRPM

## 2024-03-19 DIAGNOSIS — R10.9 ABDOMINAL PAIN, VOMITING, AND DIARRHEA: ICD-10-CM

## 2024-03-19 DIAGNOSIS — R19.7 ABDOMINAL PAIN, VOMITING, AND DIARRHEA: ICD-10-CM

## 2024-03-19 DIAGNOSIS — J06.9 ACUTE UPPER RESPIRATORY INFECTION: Primary | ICD-10-CM

## 2024-03-19 DIAGNOSIS — R11.10 ABDOMINAL PAIN, VOMITING, AND DIARRHEA: ICD-10-CM

## 2024-03-19 LAB
ALBUMIN SERPL-MCNC: 4.2 G/DL (ref 3.4–5)
ALBUMIN/GLOB SERPL: 1.5 {RATIO} (ref 1.1–2.2)
ALP SERPL-CCNC: 77 U/L (ref 40–129)
ALT SERPL-CCNC: 11 U/L (ref 10–40)
ANION GAP SERPL CALCULATED.3IONS-SCNC: 15 MMOL/L (ref 3–16)
AST SERPL-CCNC: 12 U/L (ref 15–37)
BACTERIA URNS QL MICRO: NORMAL /HPF
BASOPHILS # BLD: 0 K/UL (ref 0–0.2)
BASOPHILS NFR BLD: 0.5 %
BILIRUB SERPL-MCNC: 0.8 MG/DL (ref 0–1)
BILIRUB UR QL STRIP.AUTO: NEGATIVE
BUN SERPL-MCNC: 13 MG/DL (ref 7–20)
CALCIUM SERPL-MCNC: 8.8 MG/DL (ref 8.3–10.6)
CHARACTER UR: NORMAL
CHLORIDE SERPL-SCNC: 100 MMOL/L (ref 99–110)
CLARITY UR: CLEAR
CO2 SERPL-SCNC: 20 MMOL/L (ref 21–32)
COLOR UR: YELLOW
CREAT SERPL-MCNC: 0.7 MG/DL (ref 0.6–1.2)
DEPRECATED RDW RBC AUTO: 16.5 % (ref 12.4–15.4)
EOSINOPHIL # BLD: 0 K/UL (ref 0–0.6)
EOSINOPHIL NFR BLD: 0.7 %
EPI CELLS #/AREA URNS AUTO: 5 /HPF (ref 0–5)
FLUAV RNA UPPER RESP QL NAA+PROBE: NEGATIVE
FLUBV AG NPH QL: NEGATIVE
GFR SERPLBLD CREATININE-BSD FMLA CKD-EPI: >60 ML/MIN/{1.73_M2}
GLUCOSE SERPL-MCNC: 125 MG/DL (ref 70–99)
GLUCOSE UR STRIP.AUTO-MCNC: NEGATIVE MG/DL
HCT VFR BLD AUTO: 41.9 % (ref 36–48)
HGB BLD-MCNC: 14.6 G/DL (ref 12–16)
HGB UR QL STRIP.AUTO: ABNORMAL
HYALINE CASTS #/AREA URNS AUTO: 0 /LPF (ref 0–8)
KETONES UR STRIP.AUTO-MCNC: 40 MG/DL
LACTATE BLDV-SCNC: 1.1 MMOL/L (ref 0.4–2)
LEUKOCYTE ESTERASE UR QL STRIP.AUTO: ABNORMAL
LIPASE SERPL-CCNC: 16 U/L (ref 13–60)
LYMPHOCYTES # BLD: 1.1 K/UL (ref 1–5.1)
LYMPHOCYTES NFR BLD: 17.4 %
MCH RBC QN AUTO: 31.3 PG (ref 26–34)
MCHC RBC AUTO-ENTMCNC: 34.8 G/DL (ref 31–36)
MCV RBC AUTO: 90 FL (ref 80–100)
MONOCYTES # BLD: 0.3 K/UL (ref 0–1.3)
MONOCYTES NFR BLD: 5.3 %
NEUTROPHILS # BLD: 5 K/UL (ref 1.7–7.7)
NEUTROPHILS NFR BLD: 76.1 %
NITRITE UR QL STRIP.AUTO: NEGATIVE
PH UR STRIP.AUTO: 7 [PH] (ref 5–8)
PLATELET # BLD AUTO: 314 K/UL (ref 135–450)
PMV BLD AUTO: 7 FL (ref 5–10.5)
POTASSIUM SERPL-SCNC: 3.6 MMOL/L (ref 3.5–5.1)
PROT SERPL-MCNC: 7 G/DL (ref 6.4–8.2)
PROT UR STRIP.AUTO-MCNC: 300 MG/DL
RBC # BLD AUTO: 4.66 M/UL (ref 4–5.2)
RBC #/AREA URNS HPF: NORMAL /HPF (ref 0–4)
SARS-COV-2 RDRP RESP QL NAA+PROBE: NOT DETECTED
SODIUM SERPL-SCNC: 135 MMOL/L (ref 136–145)
SP GR UR STRIP.AUTO: 1.03 (ref 1–1.03)
UA COMPLETE W REFLEX CULTURE PNL UR: ABNORMAL
UA DIPSTICK W REFLEX MICRO PNL UR: YES
URN SPEC COLLECT METH UR: ABNORMAL
UROBILINOGEN UR STRIP-ACNC: 1 E.U./DL
WBC # BLD AUTO: 6.6 K/UL (ref 4–11)
WBC #/AREA URNS AUTO: 2 /HPF (ref 0–5)

## 2024-03-19 PROCEDURE — 81001 URINALYSIS AUTO W/SCOPE: CPT

## 2024-03-19 PROCEDURE — 87804 INFLUENZA ASSAY W/OPTIC: CPT

## 2024-03-19 PROCEDURE — 85025 COMPLETE CBC W/AUTO DIFF WBC: CPT

## 2024-03-19 PROCEDURE — 83690 ASSAY OF LIPASE: CPT

## 2024-03-19 PROCEDURE — 87635 SARS-COV-2 COVID-19 AMP PRB: CPT

## 2024-03-19 PROCEDURE — 99285 EMERGENCY DEPT VISIT HI MDM: CPT

## 2024-03-19 PROCEDURE — 6360000004 HC RX CONTRAST MEDICATION: Performed by: PHYSICIAN ASSISTANT

## 2024-03-19 PROCEDURE — 96375 TX/PRO/DX INJ NEW DRUG ADDON: CPT

## 2024-03-19 PROCEDURE — 74177 CT ABD & PELVIS W/CONTRAST: CPT

## 2024-03-19 PROCEDURE — 96374 THER/PROPH/DIAG INJ IV PUSH: CPT

## 2024-03-19 PROCEDURE — 6360000002 HC RX W HCPCS: Performed by: PHYSICIAN ASSISTANT

## 2024-03-19 PROCEDURE — 71045 X-RAY EXAM CHEST 1 VIEW: CPT

## 2024-03-19 PROCEDURE — 80053 COMPREHEN METABOLIC PANEL: CPT

## 2024-03-19 PROCEDURE — 83605 ASSAY OF LACTIC ACID: CPT

## 2024-03-19 RX ORDER — KETOROLAC TROMETHAMINE 15 MG/ML
15 INJECTION, SOLUTION INTRAMUSCULAR; INTRAVENOUS ONCE
Status: COMPLETED | OUTPATIENT
Start: 2024-03-19 | End: 2024-03-19

## 2024-03-19 RX ORDER — ONDANSETRON 4 MG/1
4 TABLET, ORALLY DISINTEGRATING ORAL 3 TIMES DAILY PRN
Qty: 21 TABLET | Refills: 0 | Status: SHIPPED | OUTPATIENT
Start: 2024-03-19

## 2024-03-19 RX ORDER — NAPROXEN 500 MG/1
500 TABLET ORAL 2 TIMES DAILY PRN
Qty: 20 TABLET | Refills: 0 | Status: SHIPPED | OUTPATIENT
Start: 2024-03-19

## 2024-03-19 RX ORDER — ONDANSETRON 2 MG/ML
4 INJECTION INTRAMUSCULAR; INTRAVENOUS ONCE
Status: COMPLETED | OUTPATIENT
Start: 2024-03-19 | End: 2024-03-19

## 2024-03-19 RX ORDER — BENZONATATE 100 MG/1
100 CAPSULE ORAL 3 TIMES DAILY PRN
Qty: 30 CAPSULE | Refills: 0 | Status: SHIPPED | OUTPATIENT
Start: 2024-03-19 | End: 2024-03-29

## 2024-03-19 RX ADMIN — ONDANSETRON 4 MG: 2 INJECTION INTRAMUSCULAR; INTRAVENOUS at 19:32

## 2024-03-19 RX ADMIN — IOPAMIDOL 75 ML: 755 INJECTION, SOLUTION INTRAVENOUS at 20:06

## 2024-03-19 RX ADMIN — KETOROLAC TROMETHAMINE 15 MG: 15 INJECTION, SOLUTION INTRAMUSCULAR; INTRAVENOUS at 19:33

## 2024-03-19 ASSESSMENT — PAIN DESCRIPTION - PAIN TYPE: TYPE: ACUTE PAIN

## 2024-03-19 ASSESSMENT — PAIN DESCRIPTION - DESCRIPTORS
DESCRIPTORS: ACHING
DESCRIPTORS: CRAMPING
DESCRIPTORS: ACHING

## 2024-03-19 ASSESSMENT — PAIN DESCRIPTION - LOCATION
LOCATION: ABDOMEN
LOCATION: ABDOMEN
LOCATION: GENERALIZED

## 2024-03-19 ASSESSMENT — PAIN DESCRIPTION - ORIENTATION
ORIENTATION: RIGHT;UPPER
ORIENTATION: RIGHT;UPPER
ORIENTATION: OTHER (COMMENT)

## 2024-03-19 ASSESSMENT — PAIN DESCRIPTION - ONSET: ONSET: ON-GOING

## 2024-03-19 ASSESSMENT — PAIN SCALES - GENERAL
PAINLEVEL_OUTOF10: 10
PAINLEVEL_OUTOF10: 8
PAINLEVEL_OUTOF10: 5

## 2024-03-19 ASSESSMENT — PAIN DESCRIPTION - FREQUENCY: FREQUENCY: CONTINUOUS

## 2024-03-19 NOTE — ED PROVIDER NOTES
Chloe came in to the clinic today for her visit, She was flagged at the entrance as a Covid risk. She has been treating a UTI for an unknown period of time with IV antibiotics and currently complains of a headache with a stiff neck. She is pale and seems a little confused. Her vital signs were taken and the Providers were notified. They wanted her to be evaluated in the ER. She was transferred there.  
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    CONSULTS: (Who and What was discussed)  None      Social Determinants : None    Records Reviewed (Source):     CC/HPI Summary, DDx, ED Course, and Reassessment:     Lalita Pimentel is a 78 y.o. female with past medical history of hypertension, diabetes, COPD, CAD, fibromyalgia, IBS who presents complaining of flu/COVID-like symptoms for the last 2 days.  Patient reports subjective fever, chills, body aches, cough, runny nose, sore throat, vomiting, diarrhea, general weakness for the last 2 days.  Family reports her vomiting is \"coughing up phlegm \".  They do report multiple loose bowel movements, watery diarrhea, denies any blood in stool, dizziness, syncope.  Patient states she is urinating more normally.  Denies any blood in urine.  Also complains of generalized abdominal pain, cramping.    On exam, vitals nonemergent, nontoxic-appearing, no distress.  Chest x-ray without acute emergent finding, low concern for bacterial pneumonia.  CBC, CMP, lactic, UA unremarkable.  COVID and flu negative.  CT with chronic sludge, biliary dilation, no evidence of biliary obstruction on imaging.  Chronic findings on CT, no acute emergent findings.  Treated with 50 mg IV Toradol and 4 mg IV Zofran here.  On recheck, feeling improved, abdominal exam remains nonsurgical.  Updated patient and family at bedside on results, plan for discharge with supportive treatment, likely self-limiting nature of her illness.  Instructed to follow-up with primary care, return for any new or worsening symptoms return for recheck in 24 to 48 hours if not improving.    Disposition Considerations (tests considered but not done, Admit vs D/C, Shared Decision Making, Pt Expectation of Test or Tx.):        I am the Primary Clinician of

## 2024-03-21 ENCOUNTER — CARE COORDINATION (OUTPATIENT)
Dept: CASE MANAGEMENT | Age: 79
End: 2024-03-21

## 2024-03-21 NOTE — CARE COORDINATION
ED Follow Up Call    3/21/2024    Patient: Lalita Pimentel Patient : 1945   MRN: [id number]  Reason for Admission: weakness, diarrhea, vomiting, coughing, abd pain  Discharge Date: 3/20/24        Care Transitions ED Follow Up    Care Transitions Interventions  Do you have any ongoing symptoms?: Yes   Patient-reported symptoms: Other (Comment: difficulty sleeping, weakness)   Do you have a copy of your discharge instructions?: Yes   Do you understand what to report and when to return?: Yes   Are you following your discharge instructions?: Yes   Do you have all of your prescriptions and are they filled?: Yes   Have you scheduled your follow up appointment?: No   Were you discharged with any Home Care or Post Acute Services or do you currently have any active services?: No         Do you have any needs or concerns that I can assist you with?: No   Identified Barriers: None          Spoke with pt who states she is still feeling weak and worn. States she has no appetite. Pt last bowel movement 3/18. States she has not been eating and therefore feels she has no stool. Encouraged pt to continue hydration and take a stool softener to help pt. Encouraged boost or ensure and spoke about this potentially helping with weakness. Pt denies using or needing any DME. Feels stable on her feet. Pt lives with a partner and has assistance at home if needed. Denies diarrhea, bowel movement, coughing congestion or urinary issues. States she is struggling with getting rest. Pt states she has trazadone but this only lets her rest 2-3 hours. Then off and on throughout the night. Pt denies any breathing issues attributing to this. Pt denies wanting to make a HFU today. Pt states she will call tomorrow when she is feeling better. Will call next week to check on pt as well.     Lindy Palma, ELIZABETN, RN   Care Transition Nurse  Mobile: [phone]

## 2024-03-26 ENCOUNTER — CARE COORDINATION (OUTPATIENT)
Dept: CASE MANAGEMENT | Age: 79
End: 2024-03-26

## 2024-03-26 ENCOUNTER — OFFICE VISIT (OUTPATIENT)
Dept: FAMILY MEDICINE CLINIC | Age: 79
End: 2024-03-26
Payer: MEDICARE

## 2024-03-26 VITALS
BODY MASS INDEX: 23.08 KG/M2 | HEART RATE: 96 BPM | HEIGHT: 64 IN | TEMPERATURE: 98.2 F | SYSTOLIC BLOOD PRESSURE: 114 MMHG | RESPIRATION RATE: 20 BRPM | OXYGEN SATURATION: 95 % | WEIGHT: 135.2 LBS | DIASTOLIC BLOOD PRESSURE: 66 MMHG

## 2024-03-26 DIAGNOSIS — R53.1 GENERALIZED WEAKNESS: ICD-10-CM

## 2024-03-26 DIAGNOSIS — K59.00 CONSTIPATION, UNSPECIFIED CONSTIPATION TYPE: ICD-10-CM

## 2024-03-26 DIAGNOSIS — R82.998 URINE LEUKOCYTES: ICD-10-CM

## 2024-03-26 DIAGNOSIS — R31.0 GROSS HEMATURIA: ICD-10-CM

## 2024-03-26 LAB
ALBUMIN SERPL-MCNC: 4.2 G/DL (ref 3.4–5)
ALBUMIN/GLOB SERPL: 1.5 {RATIO} (ref 1.1–2.2)
ALP SERPL-CCNC: 54 U/L (ref 40–129)
ALT SERPL-CCNC: 10 U/L (ref 10–40)
ANION GAP SERPL CALCULATED.3IONS-SCNC: 12 MMOL/L (ref 3–16)
AST SERPL-CCNC: 16 U/L (ref 15–37)
BASOPHILS # BLD: 0.1 K/UL (ref 0–0.2)
BASOPHILS NFR BLD: 0.8 %
BILIRUB SERPL-MCNC: 0.4 MG/DL (ref 0–1)
BILIRUBIN, POC: NORMAL
BLOOD URINE, POC: NORMAL
BUN SERPL-MCNC: 25 MG/DL (ref 7–20)
CALCIUM SERPL-MCNC: 8.7 MG/DL (ref 8.3–10.6)
CHLORIDE SERPL-SCNC: 98 MMOL/L (ref 99–110)
CLARITY, POC: CLEAR
CO2 SERPL-SCNC: 26 MMOL/L (ref 21–32)
COLOR, POC: YELLOW
CREAT SERPL-MCNC: 1.9 MG/DL (ref 0.6–1.2)
DEPRECATED RDW RBC AUTO: 16.1 % (ref 12.4–15.4)
EOSINOPHIL # BLD: 0.2 K/UL (ref 0–0.6)
EOSINOPHIL NFR BLD: 3.3 %
GFR SERPLBLD CREATININE-BSD FMLA CKD-EPI: 27 ML/MIN/{1.73_M2}
GLUCOSE SERPL-MCNC: 97 MG/DL (ref 70–99)
GLUCOSE URINE, POC: NORMAL
HCT VFR BLD AUTO: 42.4 % (ref 36–48)
HGB BLD-MCNC: 14.2 G/DL (ref 12–16)
KETONES, POC: NORMAL
LEUKOCYTE EST, POC: NORMAL
LYMPHOCYTES # BLD: 2.4 K/UL (ref 1–5.1)
LYMPHOCYTES NFR BLD: 33.9 %
MCH RBC QN AUTO: 30.8 PG (ref 26–34)
MCHC RBC AUTO-ENTMCNC: 33.6 G/DL (ref 31–36)
MCV RBC AUTO: 91.9 FL (ref 80–100)
MONOCYTES # BLD: 0.6 K/UL (ref 0–1.3)
MONOCYTES NFR BLD: 7.9 %
NEUTROPHILS # BLD: 3.9 K/UL (ref 1.7–7.7)
NEUTROPHILS NFR BLD: 54.1 %
NITRITE, POC: NORMAL
PH, POC: 5.5
PLATELET # BLD AUTO: 342 K/UL (ref 135–450)
PMV BLD AUTO: 8.2 FL (ref 5–10.5)
POTASSIUM SERPL-SCNC: 4.2 MMOL/L (ref 3.5–5.1)
PROT SERPL-MCNC: 7 G/DL (ref 6.4–8.2)
PROTEIN, POC: NORMAL
RBC # BLD AUTO: 4.62 M/UL (ref 4–5.2)
SODIUM SERPL-SCNC: 136 MMOL/L (ref 136–145)
SPECIFIC GRAVITY, POC: 1.01
UROBILINOGEN, POC: 1
WBC # BLD AUTO: 7.2 K/UL (ref 4–11)

## 2024-03-26 PROCEDURE — G8484 FLU IMMUNIZE NO ADMIN: HCPCS | Performed by: NURSE PRACTITIONER

## 2024-03-26 PROCEDURE — 1090F PRES/ABSN URINE INCON ASSESS: CPT | Performed by: NURSE PRACTITIONER

## 2024-03-26 PROCEDURE — 1036F TOBACCO NON-USER: CPT | Performed by: NURSE PRACTITIONER

## 2024-03-26 PROCEDURE — 81002 URINALYSIS NONAUTO W/O SCOPE: CPT | Performed by: NURSE PRACTITIONER

## 2024-03-26 PROCEDURE — 1123F ACP DISCUSS/DSCN MKR DOCD: CPT | Performed by: NURSE PRACTITIONER

## 2024-03-26 PROCEDURE — 99214 OFFICE O/P EST MOD 30 MIN: CPT | Performed by: NURSE PRACTITIONER

## 2024-03-26 PROCEDURE — G8420 CALC BMI NORM PARAMETERS: HCPCS | Performed by: NURSE PRACTITIONER

## 2024-03-26 PROCEDURE — G8427 DOCREV CUR MEDS BY ELIG CLIN: HCPCS | Performed by: NURSE PRACTITIONER

## 2024-03-26 PROCEDURE — G8399 PT W/DXA RESULTS DOCUMENT: HCPCS | Performed by: NURSE PRACTITIONER

## 2024-03-26 ASSESSMENT — ENCOUNTER SYMPTOMS
VOMITING: 0
WHEEZING: 1
SHORTNESS OF BREATH: 1
CONSTIPATION: 1
ABDOMINAL PAIN: 0
COUGH: 1
NAUSEA: 0
BLOOD IN STOOL: 0
DIARRHEA: 0

## 2024-03-26 NOTE — PROGRESS NOTES
3/26/2024    This is a 78 y.o. female   Chief Complaint   Patient presents with    Follow-up     Went to er 3/19/24 for generalized weakness.  No appetite. Fatigue.  Insomnia.  Doing better but still week.    .    HPI  Patient is here for ER f/u from 3/19/24. Dx with viral URI. Influenza and COVID-19 testing was negative.     Reports that her appetite was decreased.   Feels that she is getting better. She was able to eat some chicken today. Prior she was only eating small amount of soup.   Did feel nauseated but that is now resolved. Did have some gas pains but that is now resolved.     Reports that BM is typically every 5 days. Has not had a BM since before ER visit on 3/19. Reports that she is staying hydrated with water.     Has noticed that she has had a small amount of blood in urine yesterday. Did not notice yet today. Denies dysuria, pelvic pain.     Has history of COPD. Denies increased cough or shortness of breath. Has not needed to wear oxygen.     Concerned as she continues to feel week. Feels that her gait has been off. Denies dizziness.      Patient Active Problem List   Diagnosis    Osteopenia-last dexa 2009 repeat 2015 (-2.4 hip)--advised tx    Colon polyp, hyperplastic,-(done 3/11-repeat 3-5 yrs--dr ulloa)    Family history of colon cancer    CTS (carpal tunnel syndrome)BILATERAL-s/p surgical repair--resolved     Postmenopausal status-last mammogram 2/15 wnl last pap 12/13--wnl    Nondependent alcohol abuse, in remission    Urticaria, chronic    Smoking    Chronic back pain-(djd) saw dr oleary--cannot afford surgery--seeing dr aguirre for pain meds    Fibromyalgia    Hypercholesteremia    Lumbar spondylosis    Vitamin D deficiency--severe--started 50,000 iu 2x/ wk 11/13    Insomnia--on elevil w help    Constipation--on daily miralax    Depression    Chronic pain syndrome    Muscle cramping    Acute on chronic respiratory failure with hypercapnia (HCC)    Gastroesophageal reflux disease    COPD,

## 2024-03-26 NOTE — CARE COORDINATION
Care Transitions Follow Up Call    Patient: Lalita Pimentel  Patient : 1945   MRN: [id number]  Reason for Admission: COPD exacerbation  Discharge Date: 3/19/24 RARS: Readmission Risk Score: 10.7    Final outreach call attempt, no answer.  CTN left  with contact information and request for return call.  CTN will hand off to [facility] Janet Villa and notify [facility] of handoff.    Follow Up  Future Appointments   Date Time Provider Department Center   3/26/2024  3:00 PM Belkis Silva APRN - CNP [facility]   2024 11:00 AM Babatunde Leach MD [facility]   2024 11:00 AM Belkis Silva APRN - CNP [facility]   2024 11:00 AM Antelmo Garza MD [facility]   2024  1:00 PM Antelmo Garza MD [facility]       ELIZABET PepperN, RN   Care Transition Nurse  Mobile: [phone]

## 2024-03-27 ENCOUNTER — APPOINTMENT (OUTPATIENT)
Dept: GENERAL RADIOLOGY | Age: 79
End: 2024-03-27
Payer: MEDICARE

## 2024-03-27 ENCOUNTER — TELEPHONE (OUTPATIENT)
Dept: FAMILY MEDICINE CLINIC | Age: 79
End: 2024-03-27

## 2024-03-27 ENCOUNTER — HOSPITAL ENCOUNTER (EMERGENCY)
Age: 79
Discharge: HOME | End: 2024-03-27
Attending: STUDENT IN AN ORGANIZED HEALTH CARE EDUCATION/TRAINING PROGRAM
Payer: MEDICARE

## 2024-03-27 VITALS
HEIGHT: 64 IN | WEIGHT: 136.47 LBS | TEMPERATURE: 97.4 F | RESPIRATION RATE: 20 BRPM | DIASTOLIC BLOOD PRESSURE: 64 MMHG | BODY MASS INDEX: 23.3 KG/M2 | HEART RATE: 96 BPM | SYSTOLIC BLOOD PRESSURE: 99 MMHG | OXYGEN SATURATION: 95 %

## 2024-03-27 DIAGNOSIS — R53.83 OTHER FATIGUE: ICD-10-CM

## 2024-03-27 DIAGNOSIS — N17.9 AKI (ACUTE KIDNEY INJURY) (HCC): Primary | ICD-10-CM

## 2024-03-27 LAB
ALBUMIN SERPL-MCNC: 4.2 G/DL (ref 3.4–5)
ALBUMIN/GLOB SERPL: 1.5 {RATIO} (ref 1.1–2.2)
ALP SERPL-CCNC: 59 U/L (ref 40–129)
ALT SERPL-CCNC: 11 U/L (ref 10–40)
ANION GAP SERPL CALCULATED.3IONS-SCNC: 12 MMOL/L (ref 3–16)
AST SERPL-CCNC: 13 U/L (ref 15–37)
BACTERIA UR CULT: NORMAL
BACTERIA URNS QL MICRO: ABNORMAL /HPF
BASE EXCESS BLDV CALC-SCNC: 2.6 MMOL/L
BASOPHILS # BLD: 0 K/UL (ref 0–0.2)
BASOPHILS NFR BLD: 0.6 %
BILIRUB SERPL-MCNC: 0.4 MG/DL (ref 0–1)
BILIRUB UR QL STRIP.AUTO: NEGATIVE
BUN SERPL-MCNC: 21 MG/DL (ref 7–20)
CALCIUM SERPL-MCNC: 8.9 MG/DL (ref 8.3–10.6)
CHLORIDE SERPL-SCNC: 101 MMOL/L (ref 99–110)
CLARITY UR: CLEAR
CO2 BLDV-SCNC: 33 MMOL/L
CO2 SERPL-SCNC: 27 MMOL/L (ref 21–32)
COHGB MFR BLDV: 1.1 %
COLOR UR: YELLOW
CREAT SERPL-MCNC: 1.5 MG/DL (ref 0.6–1.2)
DEPRECATED RDW RBC AUTO: 16.5 % (ref 12.4–15.4)
EOSINOPHIL # BLD: 0.3 K/UL (ref 0–0.6)
EOSINOPHIL NFR BLD: 5.2 %
EPI CELLS #/AREA URNS AUTO: 6 /HPF (ref 0–5)
GFR SERPLBLD CREATININE-BSD FMLA CKD-EPI: 36 ML/MIN/{1.73_M2}
GLUCOSE SERPL-MCNC: 87 MG/DL (ref 70–99)
GLUCOSE UR STRIP.AUTO-MCNC: NEGATIVE MG/DL
HCO3 BLDV-SCNC: 31 MMOL/L (ref 23–29)
HCT VFR BLD AUTO: 44.6 % (ref 36–48)
HGB BLD-MCNC: 14.8 G/DL (ref 12–16)
HGB UR QL STRIP.AUTO: NEGATIVE
HYALINE CASTS #/AREA URNS AUTO: 3 /LPF (ref 0–8)
KETONES UR STRIP.AUTO-MCNC: NEGATIVE MG/DL
LEUKOCYTE ESTERASE UR QL STRIP.AUTO: ABNORMAL
LYMPHOCYTES # BLD: 2.1 K/UL (ref 1–5.1)
LYMPHOCYTES NFR BLD: 31.7 %
MCH RBC QN AUTO: 30.8 PG (ref 26–34)
MCHC RBC AUTO-ENTMCNC: 33.2 G/DL (ref 31–36)
MCV RBC AUTO: 92.7 FL (ref 80–100)
METHGB MFR BLDV: 0.7 %
MONOCYTES # BLD: 0.5 K/UL (ref 0–1.3)
MONOCYTES NFR BLD: 7.9 %
NEUTROPHILS # BLD: 3.6 K/UL (ref 1.7–7.7)
NEUTROPHILS NFR BLD: 54.6 %
NITRITE UR QL STRIP.AUTO: NEGATIVE
NT-PROBNP SERPL-MCNC: 115 PG/ML (ref 0–449)
O2 THERAPY: ABNORMAL
PCO2 BLDV: 63.1 MMHG (ref 40–50)
PH BLDV: 7.3 [PH] (ref 7.35–7.45)
PH UR STRIP.AUTO: 5.5 [PH] (ref 5–8)
PLATELET # BLD AUTO: 380 K/UL (ref 135–450)
PMV BLD AUTO: 7.9 FL (ref 5–10.5)
PO2 BLDV: <30 MMHG
POTASSIUM SERPL-SCNC: 4.1 MMOL/L (ref 3.5–5.1)
PROT SERPL-MCNC: 7 G/DL (ref 6.4–8.2)
PROT UR STRIP.AUTO-MCNC: NEGATIVE MG/DL
RBC # BLD AUTO: 4.81 M/UL (ref 4–5.2)
RBC CLUMPS #/AREA URNS AUTO: 1 /HPF (ref 0–4)
SAO2 % BLDV: 19 %
SODIUM SERPL-SCNC: 140 MMOL/L (ref 136–145)
SP GR UR STRIP.AUTO: 1.01 (ref 1–1.03)
TSH SERPL DL<=0.005 MIU/L-ACNC: 1.71 UIU/ML (ref 0.27–4.2)
UA COMPLETE W REFLEX CULTURE PNL UR: ABNORMAL
UA DIPSTICK W REFLEX MICRO PNL UR: YES
URN SPEC COLLECT METH UR: ABNORMAL
UROBILINOGEN UR STRIP-ACNC: 1 E.U./DL
WBC # BLD AUTO: 6.7 K/UL (ref 4–11)
WBC #/AREA URNS AUTO: 2 /HPF (ref 0–5)

## 2024-03-27 PROCEDURE — 93005 ELECTROCARDIOGRAM TRACING: CPT | Performed by: STUDENT IN AN ORGANIZED HEALTH CARE EDUCATION/TRAINING PROGRAM

## 2024-03-27 PROCEDURE — 85025 COMPLETE CBC W/AUTO DIFF WBC: CPT

## 2024-03-27 PROCEDURE — 84443 ASSAY THYROID STIM HORMONE: CPT

## 2024-03-27 PROCEDURE — 81001 URINALYSIS AUTO W/SCOPE: CPT

## 2024-03-27 PROCEDURE — 80053 COMPREHEN METABOLIC PANEL: CPT

## 2024-03-27 PROCEDURE — 83880 ASSAY OF NATRIURETIC PEPTIDE: CPT

## 2024-03-27 PROCEDURE — 71045 X-RAY EXAM CHEST 1 VIEW: CPT

## 2024-03-27 PROCEDURE — 2580000003 HC RX 258: Performed by: STUDENT IN AN ORGANIZED HEALTH CARE EDUCATION/TRAINING PROGRAM

## 2024-03-27 PROCEDURE — 82803 BLOOD GASES ANY COMBINATION: CPT

## 2024-03-27 PROCEDURE — 99285 EMERGENCY DEPT VISIT HI MDM: CPT

## 2024-03-27 RX ORDER — SODIUM CHLORIDE, SODIUM LACTATE, POTASSIUM CHLORIDE, AND CALCIUM CHLORIDE .6; .31; .03; .02 G/100ML; G/100ML; G/100ML; G/100ML
1000 INJECTION, SOLUTION INTRAVENOUS ONCE
Status: COMPLETED | OUTPATIENT
Start: 2024-03-27 | End: 2024-03-27

## 2024-03-27 RX ADMIN — SODIUM CHLORIDE, POTASSIUM CHLORIDE, SODIUM LACTATE AND CALCIUM CHLORIDE 1000 ML: 600; 310; 30; 20 INJECTION, SOLUTION INTRAVENOUS at 14:29

## 2024-03-27 ASSESSMENT — PAIN - FUNCTIONAL ASSESSMENT: PAIN_FUNCTIONAL_ASSESSMENT: 0-10

## 2024-03-27 ASSESSMENT — PAIN SCALES - GENERAL: PAINLEVEL_OUTOF10: 0

## 2024-03-27 NOTE — TELEPHONE ENCOUNTER
Dr. Zamudio calling from [facility] to update PCP  [phone]    Transferred to Dr. Golden

## 2024-03-27 NOTE — TELEPHONE ENCOUNTER
Talked to ER doc  Creat yest 1.9.  sent to ER for eval/retest.  (It was 1.5)  She was likely volume depleted due to viral uri.   IV fluids were given and advised outpatient follow up (as she was feeling better with fluids and creat is improving).    He was not sure if she has been using naproxen  (prescribed form ER on 3/19) but will have her stop that if she is.    Staff: have Lalita follow up with Belkis early next week.  Thanks.

## 2024-03-27 NOTE — ED PROVIDER NOTES
None    - Tests considered/Risk stratification tools: None    - Disposition consideration: Appropriate for outpatient management    Is this patient to be included in the SEP-1 Core Measure?  No   Exclusion criteria - the patient is NOT to be included for SEP-1 Core Measure due to:  2+ SIRS criteria are not met    IVinay MD, am the primary clinician of record.     During the patient's ED course, the patient was given:  Medications   lactated ringers bolus 1,000 mL (0 mLs IntraVENous Stopped 3/27/24 2821)        Patient was given prescriptions for the following medications. I counseled the patient as to how to take these medications.  New Prescriptions    No medications on file       Patient instructed to follow up with:   No follow-up provider specified.    All questions were answered and the patient/family expressed understanding and agreement with the plan. I am the primary attending of record.     PROCEDURES   None      CRITICAL CARE  None    CLINICAL IMPRESSION  1. ROLY (acute kidney injury) (HCC)    2. Other fatigue          DISPOSITION    Decision To Discharge 03/27/2024 04:15:29 PM     Vinay Zamudio MD      Note: This chart was created using voice recognition dictation software. Efforts were made by me to ensure accuracy, however some errors may be present due to limitations of this technology and occasionally words are not transcribed correctly.      Vinay Zamudio MD  03/27/24 7874

## 2024-03-28 LAB
EKG ATRIAL RATE: 78 BPM
EKG DIAGNOSIS: NORMAL
EKG P AXIS: 72 DEGREES
EKG P-R INTERVAL: 158 MS
EKG Q-T INTERVAL: 396 MS
EKG QRS DURATION: 70 MS
EKG QTC CALCULATION (BAZETT): 451 MS
EKG R AXIS: 28 DEGREES
EKG T AXIS: 70 DEGREES
EKG VENTRICULAR RATE: 78 BPM

## 2024-03-28 PROCEDURE — 93010 ELECTROCARDIOGRAM REPORT: CPT | Performed by: INTERNAL MEDICINE

## 2024-03-28 NOTE — TELEPHONE ENCOUNTER
Spoke to patient and she is feeling better.  She has stopped taking naproxen. She is scheduled for a f/u with Belkis.

## 2024-04-03 ENCOUNTER — OFFICE VISIT (OUTPATIENT)
Dept: FAMILY MEDICINE CLINIC | Age: 79
End: 2024-04-03
Payer: MEDICARE

## 2024-04-03 VITALS
OXYGEN SATURATION: 96 % | SYSTOLIC BLOOD PRESSURE: 112 MMHG | TEMPERATURE: 98.2 F | BODY MASS INDEX: 24.07 KG/M2 | WEIGHT: 141 LBS | HEART RATE: 93 BPM | RESPIRATION RATE: 20 BRPM | DIASTOLIC BLOOD PRESSURE: 68 MMHG | HEIGHT: 64 IN

## 2024-04-03 DIAGNOSIS — N17.9 AKI (ACUTE KIDNEY INJURY) (HCC): ICD-10-CM

## 2024-04-03 DIAGNOSIS — N17.9 AKI (ACUTE KIDNEY INJURY) (HCC): Primary | ICD-10-CM

## 2024-04-03 DIAGNOSIS — R53.1 GENERALIZED WEAKNESS: ICD-10-CM

## 2024-04-03 LAB
ANION GAP SERPL CALCULATED.3IONS-SCNC: 11 MMOL/L (ref 3–16)
BUN SERPL-MCNC: 9 MG/DL (ref 7–20)
CALCIUM SERPL-MCNC: 9.5 MG/DL (ref 8.3–10.6)
CHLORIDE SERPL-SCNC: 102 MMOL/L (ref 99–110)
CO2 SERPL-SCNC: 29 MMOL/L (ref 21–32)
CREAT SERPL-MCNC: 1 MG/DL (ref 0.6–1.2)
GFR SERPLBLD CREATININE-BSD FMLA CKD-EPI: 58 ML/MIN/{1.73_M2}
GLUCOSE SERPL-MCNC: 125 MG/DL (ref 70–99)
POTASSIUM SERPL-SCNC: 4.5 MMOL/L (ref 3.5–5.1)
SODIUM SERPL-SCNC: 142 MMOL/L (ref 136–145)

## 2024-04-03 PROCEDURE — 1123F ACP DISCUSS/DSCN MKR DOCD: CPT | Performed by: NURSE PRACTITIONER

## 2024-04-03 PROCEDURE — 99213 OFFICE O/P EST LOW 20 MIN: CPT | Performed by: NURSE PRACTITIONER

## 2024-04-03 PROCEDURE — 1036F TOBACCO NON-USER: CPT | Performed by: NURSE PRACTITIONER

## 2024-04-03 PROCEDURE — 1090F PRES/ABSN URINE INCON ASSESS: CPT | Performed by: NURSE PRACTITIONER

## 2024-04-03 PROCEDURE — G8427 DOCREV CUR MEDS BY ELIG CLIN: HCPCS | Performed by: NURSE PRACTITIONER

## 2024-04-03 PROCEDURE — G8420 CALC BMI NORM PARAMETERS: HCPCS | Performed by: NURSE PRACTITIONER

## 2024-04-03 PROCEDURE — G8399 PT W/DXA RESULTS DOCUMENT: HCPCS | Performed by: NURSE PRACTITIONER

## 2024-04-03 ASSESSMENT — ENCOUNTER SYMPTOMS
BLOOD IN STOOL: 0
COUGH: 1
VOMITING: 0
CONSTIPATION: 0
DIARRHEA: 0
NAUSEA: 0

## 2024-04-03 NOTE — PROGRESS NOTES
visit but has not been contacted yet. Will contact [facility] again to request services.       Return in about 16 days (around 4/19/2024), or if symptoms worsen or fail to improve, for chronic conditions.

## 2024-04-05 NOTE — PROGRESS NOTES
Flower Mccall  1945  [id number]    HISTORY OF PRESENT ILLNESS:  Ms. Anthony Gomez is a 78 y.o. female returns for a follow up visit for multiple medical problems. Her  presenting problems are   1. Chronic pain syndrome    2. DDD (degenerative disc disease), lumbar    3. Osteoarthritis of spine with radiculopathy, lumbar region    4. Lumbar spondylosis    5. Fibromyalgia    6. Trochanteric bursitis of right hip    7. Degeneration of lumbar or lumbosacral intervertebral disc    8. Moderate episode of recurrent major depressive disorder ([address]    9. Gastroesophageal reflux disease without esophagitis    10. Primary insomnia    . As per information/history obtained from the PADT(patient assessment and documentation tool) -  She complains of pain in the lower back with radiation to the buttocks and hips Bilateral She rates the pain 7/10 and describes it as sharp. Pain is made worse by: standing. Current treatment regimen has helped relieve about 30% of the pain. She denies side effects from the current pain regimen. Patient reports that since the last follow up visit the physical functioning is worse, family/social relationships are unchanged, mood is worse and sleep patterns are unchanged, and that the overall functioning is worse. Patient denies neurological bowel or bladder. Patient denies misusing/abusing her narcotic pain medications or using any illegal drugs. There are No indicators for possible drug abuse, addiction or diversion problems. Upon obtaining the medical history from Ms. Anthony Gomez regarding today's office visit for her presenting problems, patient was last seen more than 3 months ago, she was in the hospital and then the nursing home for a few weeks. Ms. Anthony Gomez states she had pneumonia and heart attack, she is getting IV Antibiotics for Pseudomonas. She states she was given Ultram but is not helping with pain.  Patient says her back and leg hurting the most. Patient states her breathing has been okay, she is on oxygen. Patient states she lost a lot of weight and is off smoking for 2 months. Patient states her sleep is fair. Has fairly normal sleep latency. Averages about 4-6 hours of sleep a night. Denies any signs of sleep apnea. Feels somewhat rested in the morning. Patient's  subjective report of her mood is fair. she describes occasional symptoms of depression, occasional  irritability and some mood swings. Describes her mood as being neutral and reports some pleasure in her daily activities. Reports  fair  appetite, energy and concentration. Able to function well in different aspects of her daily activities. Denies suicidal or homicidal ideation. Denies any complaints of increased tension, does   Worry sometimes and occasional  irritability  she denies any c/o increased anxiety, No c/o panic attacks or symptoms of PTSD. ALLERGIES/PAST MED/FAM/SOC HISTORY: Ms. Aniya Galeano allergies, past medical, family and social history were reviewed in the chart. Ms. Aniya Galeano current medications are   Outpatient Medications Prior to Visit   Medication Sig Dispense Refill    tobramycin, PF, (LUCILLE) 300 MG/5ML nebulizer solution Take 5 mLs by nebulization in the morning and 5 mLs in the evening. Do all this for 6 days. 60 mL 0    DULoxetine (CYMBALTA) 60 MG extended release capsule TAKE 1 CAPSULE BY MOUTH TWICE A  capsule 0    meropenem (MERREM) infusion Infuse 1,000 mg intravenously in the morning and 1,000 mg at noon and 1,000 mg in the evening. Do all this for 21 days. Compound per protocol. . 1 each 0    alendronate (FOSAMAX) 70 MG tablet TAKE 1 TABLET BY MOUTH ONE TIME PER WEEK 12 tablet 1    aspirin 81 MG EC tablet Take 1 tablet by mouth in the morning. 30 tablet 3    prasugrel (EFFIENT) 10 MG TABS Take 1 tablet by mouth in the morning.  30 tablet 1    pantoprazole (PROTONIX) 40 MG tablet Take 1 tablet by mouth daily 30 tablet 1    traZODone (DESYREL) 50 MG tablet Take 1-2 tablets by mouth nightly 60 tablet 1    Fluticasone-Umeclidin-Vilant (TRELEGY ELLIPTA) 200-62.5-25 MCG/INH AEPB Inhale 1 Inhaler into the lungs daily 1 each 5    albuterol sulfate  (90 Base) MCG/ACT inhaler Inhale 2 puffs into the lungs every 6 hours as needed for Shortness of Breath 1 each 11    ONE TOUCH LANCETS MISC 1 each by Does not apply route daily 100 each 3    ipratropium-albuterol (DUONEB) 0.5-2.5 (3) MG/3ML SOLN nebulizer solution Take 1 aerosol every 4 hours for 2 days and then as needed for shortness of breath coughing and wheezing 360 mL 3    cetirizine (ZYRTEC) 10 MG tablet Take 10 mg by mouth daily as needed for Allergies      Nebulizers (COMPRESSOR/NEBULIZER) MISC 1 Units by Does not apply route 4 times daily 1 each 3    traMADol (ULTRAM) 50 MG tablet Take 50 mg by mouth every 6 hours as needed for Pain.      gabapentin (NEURONTIN) 600 MG tablet Take 1 tablet by mouth 2 times daily for 30 days. 60 tablet 0    tiZANidine (ZANAFLEX) 4 MG tablet Take 1 tablet by mouth nightly 30 tablet 1    Calcium Citrate-Vitamin D 500-500 MG-UNIT CHEW Take 1 tablet by mouth 2 times daily (Patient not taking: Reported on 9/20/2022)      Cholecalciferol (VITAMIN D) 2000 units TABS tablet Take 1 tablet by mouth daily (Patient not taking: Reported on 9/20/2022) 30 tablet      No facility-administered medications prior to visit. REVIEW OF SYSTEMS: .   Respiratory: Negative for shortness of breath. Cardiovascular: Negative for chest pain, palpitations  Gastrointestinal: Negative for blood in stool, abdominal distention, nausea, vomiting, abdominal pain, diarrhea,constipation. Neurological: Negative for speech difficulty, weakness and light-headedness, dizziness, tremors, sleepiness  Psychiatric/Behavioral: Negative for suicidal ideas, hallucinations, behavioral problems, self-injury, decreased concentration/cognition, agitation, confusion. PHYSICAL EXAM:   Nursing note and vitals reviewed.  /72   Pulse 92   Wt 120 lb 9.6 oz (54.7 kg)   SpO2 100%   BMI 20.70 kg/m²   General Appearance: Patient is well nourished, well developed, well groomed and in no acute distress, on oxygen. Skin: Skin is warm and dry, good turgor . No rash or lesions noted. She is not diaphoretic. Pulmonary/Chest: Effort normal. No respiratory distress or use of accessory muscles. Auscultation revealing decreased air exchange bilaterally. She does not have wheezes in the lung fields. She has no rales. Cardiovascular: Normal rate, regular rhythm, normal heart sounds, and does not have murmur. Exam reveals no gallop and no friction rub. Musculoskeletal / Extremities: Range of motion is normal. Gait is normal, assistive devices use: wheelchair. She exhibits edema: none, and no tenderness. Neurological/Psychiatric:She is alert and oriented to person, place, and time. Coordination is  normal.   Judgement and Insight is normal  Her mood is Appropriate and affect is Neutral/Euthymic(normal) . Her behavior is normal.   thought content normal.        IMPRESSION:     1. Chronic pain syndrome - INADEQUATELY CONTROLLED: treatment plan adjusted as below    2. DDD (degenerative disc disease), lumbar - INADEQUATELY CONTROLLED: treatment plan adjusted as below   3. Osteoarthritis of spine with radiculopathy, lumbar region - INADEQUATELY CONTROLLED: treatment plan adjusted as below   4. Lumbar spondylosis - INADEQUATELY CONTROLLED: treatment plan adjusted as below   5. Fibromyalgia - INADEQUATELY CONTROLLED: treatment plan adjusted as below   6. Trochanteric bursitis of right hip - STABLE: Continue current treatment plan    7. Degeneration of lumbar or lumbosacral intervertebral disc - STABLE: Continue current treatment plan   8. Moderate episode of recurrent major depressive disorder (HCC) - STABLE: Continue current treatment plan   9. Gastroesophageal reflux disease without esophagitis - STABLE: Continue current treatment plan   10.  Primary insomnia - STABLE: Continue current treatment plan       PLAN:  Informed verbal consent was obtained.  -Interm history reviewed    -Labs reviewed   -Imaging slides reviewed   -Discontinue Ultram  -Start Percocet 5 mg 4 per day  -Hold Mobic   -Restart Neurontin 600 mg BID   -she was advised proper sleep hygiene-told to avoid:use of caffeine or other stimulants after noon, alcohol use near bedtime, long or frequent naps during the day, erratic sleep schedule, heavy meals near bedtime, vigorous exercise near bedtime and use of electronic devices near bedtime   -Continue with Trazodone   -CBT techniques- relaxation therapies such as biofeedback, mindfulness based stress reduction, imagery, cognitive restructuring, problem solving discussed with patient   -Continue with Cymbalta 60 mg   -Advised patient to quit smoking for  health related concerns and to improve the treatment outcomes. Education was given on quitting smoking and the use of different modalities including medications, hypnotherapy, counselling  and biofeedback. These were discussed with patient.   -She was advised to increase fluids ( 5-7  glasses of fluid daily), limit caffeine, avoid cheese products, increase dietary fiber, increase activity and exercise as tolerated and relax regularly and enjoy meals   Ms. Rafael Alcantar will be prescribed  the medications  listed below which are for treatment of her presenting  medical problems which for this visit include:   Diagnoses of Chronic pain syndrome, DDD (degenerative disc disease), lumbar, Osteoarthritis of spine with radiculopathy, lumbar region, Lumbar spondylosis, Fibromyalgia, Trochanteric bursitis of right hip, Degeneration of lumbar or lumbosacral intervertebral disc, Moderate episode of recurrent major depressive disorder ([address] Gastroesophageal reflux disease without esophagitis, and Primary insomnia were pertinent to this visit.   Medications/orders associated with this visit:    Current Outpatient Medications   Medication Sig Dispense Refill    tobramycin, PF, (LUCILLE) 300 MG/5ML nebulizer solution Take 5 mLs by nebulization in the morning and 5 mLs in the evening. Do all this for 6 days. 60 mL 0    DULoxetine (CYMBALTA) 60 MG extended release capsule TAKE 1 CAPSULE BY MOUTH TWICE A  capsule 0    meropenem (MERREM) infusion Infuse 1,000 mg intravenously in the morning and 1,000 mg at noon and 1,000 mg in the evening. Do all this for 21 days. Compound per protocol. . 1 each 0    alendronate (FOSAMAX) 70 MG tablet TAKE 1 TABLET BY MOUTH ONE TIME PER WEEK 12 tablet 1    aspirin 81 MG EC tablet Take 1 tablet by mouth in the morning. 30 tablet 3    prasugrel (EFFIENT) 10 MG TABS Take 1 tablet by mouth in the morning. 30 tablet 1    pantoprazole (PROTONIX) 40 MG tablet Take 1 tablet by mouth daily 30 tablet 1    traZODone (DESYREL) 50 MG tablet Take 1-2 tablets by mouth nightly 60 tablet 1    Fluticasone-Umeclidin-Vilant (TRELEGY ELLIPTA) 200-62.5-25 MCG/INH AEPB Inhale 1 Inhaler into the lungs daily 1 each 5    albuterol sulfate  (90 Base) MCG/ACT inhaler Inhale 2 puffs into the lungs every 6 hours as needed for Shortness of Breath 1 each 11    ONE TOUCH LANCETS MISC 1 each by Does not apply route daily 100 each 3    ipratropium-albuterol (DUONEB) 0.5-2.5 (3) MG/3ML SOLN nebulizer solution Take 1 aerosol every 4 hours for 2 days and then as needed for shortness of breath coughing and wheezing 360 mL 3    cetirizine (ZYRTEC) 10 MG tablet Take 10 mg by mouth daily as needed for Allergies      Nebulizers (COMPRESSOR/NEBULIZER) MISC 1 Units by Does not apply route 4 times daily 1 each 3    gabapentin (NEURONTIN) 600 MG tablet Take 1 tablet by mouth 2 times daily for 30 days.  60 tablet 0    Calcium Citrate-Vitamin D 500-500 MG-UNIT CHEW Take 1 tablet by mouth 2 times daily (Patient not taking: Reported on 9/20/2022)      Cholecalciferol (VITAMIN D) 2000 units TABS tablet Take 1 tablet by mouth daily (Patient not taking: Reported on 9/20/2022) 30 tablet      No current facility-administered medications for this visit. Goals of current treatment regimen include improvement in pain, restoration of functioning- with focus on improvement in physical performance, general activity, work or disability,emotional distress, health care utilization and  decreased medication consumption. Will continue to monitor progress towards achieving/maintaining therapeutic goals with special emphasis on  1. Improvement in perceived interfernce  of pain with ADL's. Ability to do home exercises independently. Ability to do household chores indoor and/or outdoor work and social and leisure activities. To increase flexibility/ROM, strength and endurance. Improve psychosocial and physical functioning.- she is not showing any significant progress/or showing regression  towards this goal and reassessment and adjustment of goals/treatment have been made. 2. Improving sleep to 6-7 hours a night. Improve mood/ anxiety and depression symptoms such as crying spells, low energy, problems with concentration, motivation. - she is not showing any significant progress/or showing regression  towards this goal and reassessment and adjustment of goals/treatment have been made. 3. Reduction of reliance on opioid analgesia/more appropriate opioid use. - she is showing progression towards this treatment goal with the current regimen. Risks and benefits of the medications and other alternative treatments have been/were  discussed with the patient. Any questions on the  common side effects of these medications were also answered. She was advised against drinking alcohol with the narcotic pain medicines, advised against driving or handling machinery when  starting or adjusting the dose of medicines, feeling groggy or drowsy, or if having any cognitive issues related to the current medications.  Sheis fully aware of the risk of overdose and death, if medicines are misused and not taken as prescribed. If she develops new symptoms or if the symptoms worsen, she was told to call the office. .  Thank you for allowing me to participate in the care of this patient.     Kanchan Ulloa MD    Cc: HECTOR Mayfield - CNP no

## 2024-04-08 ENCOUNTER — CARE COORDINATION (OUTPATIENT)
Dept: CARE COORDINATION | Age: 79
End: 2024-04-08

## 2024-04-08 ENCOUNTER — TELEPHONE (OUTPATIENT)
Dept: FAMILY MEDICINE CLINIC | Age: 79
End: 2024-04-08

## 2024-04-08 ASSESSMENT — ENCOUNTER SYMPTOMS: DYSPNEA ASSOCIATED WITH: MINIMAL EXERTION

## 2024-04-08 NOTE — CARE COORDINATION
Ambulatory Care Coordination Note     2024 3:49 PM     Patient Current Location:  Home: [address] [place] [address]  This patient was received as a referral from Ambulatory Care Manager .     ACM contacted the patient by telephone. Verified name and  with patient as identifiers. Provided introduction to self, and explanation of the ACM role.   Patient accepted care management services at this time.          ACM: Janet Villa RN     Challenges to be reviewed by the provider   Additional needs identified to be addressed with provider No  none               Method of communication with provider: chart routing.    Care Summary Note: ACM spoke to pt, enrolled in RPM.  Pt aware of how to utilize, welcome completed.  Pt taking meds as prescribed.  Pt education for COPD sent via regular mail.  Pt states she is feeling better, no s/s of disease exacerbation.       Remote Patient Monitoring Welcome Note  Date/Time:  2024 3:51 PM  Patient Current Location: Home: [address] [place] [address]  Verified patients name and  as identifiers.  Completed and confirmed the following:   Emergency Contact:     Domi Burr (Child)  [phone] (Mobile)     [x] Patient received all RPM equipment (tablet, scale, blood pressure device and cuff, and pulse oximeter)  Cuff Size: regular (9.05\"-15.74\")    Weight Scale:  regular (<330lbs)                    [x] Instructed patient keep box for use when returning equipment                                                          [x] Reviewed Patient Welcome Letter with patient    [x]  Reviewed Consent Form  Copy of consent form in chart.                 [x] Reviewed expectations for patient and care team  Monitoring hours M-F 9-4pm  It is important to take your vitals every day, even on the weekends,to keep your care team aware of how you are doing every day of the week.  Completing monitoring by 12pm on  so that alerts can be responded to

## 2024-04-08 NOTE — TELEPHONE ENCOUNTER
[facility] pt does not need any OT at this time       Alondra   [facility]  [phone]

## 2024-04-08 NOTE — CARE COORDINATION
Remote Alert Monitoring Note  Rpm alert to be reviewed by the provider   red alert   pulse ox reading (88%)                       Date/Time:  2024   Patient Current Location: [facility] contacted patient by telephone. Verified patients name and  as identifiers.  Background: Pt enrolled for COPD    Refer to 911 immediately if:  Patient unresponsive or unable to provide history  Change in cognition or sudden confusion  Patient unable to respond in complete sentences  Intense chest pain/tightness  Any concern for any clinical emergency  Red Alert: Provider response time of 1 hr required for any red alert requiring intervention  Yellow Alert: Provider response time of 3hr required for any escalated yellow alert    O2 Triage  Are you having any Chest Pain? no   Are you having any Shortness of Breath? Yes- at baseline   Swelling in your hands or feet? no     Are you having any other health concerns or issues? no      Clinical Interventions:  Spoke with patient, she reports she is doing well, her SOB is at baseline, the o2 did increase to the mid 90s. She reports the device registers so fast that it does not catch the final reading. Other metrics are WNL. She had home OT today and they are sending PT out next as they feel she no longer needs OT.     Plan/Follow Up: Will continue to review, monitor and address alerts with follow up based on severity of symptoms and risk factors.

## 2024-04-09 ENCOUNTER — OFFICE VISIT (OUTPATIENT)
Dept: PAIN MANAGEMENT | Age: 79
End: 2024-04-09
Payer: MEDICARE

## 2024-04-09 ENCOUNTER — TELEPHONE (OUTPATIENT)
Dept: FAMILY MEDICINE CLINIC | Age: 79
End: 2024-04-09

## 2024-04-09 VITALS
WEIGHT: 144 LBS | SYSTOLIC BLOOD PRESSURE: 123 MMHG | OXYGEN SATURATION: 93 % | HEART RATE: 84 BPM | BODY MASS INDEX: 24.71 KG/M2 | DIASTOLIC BLOOD PRESSURE: 85 MMHG

## 2024-04-09 DIAGNOSIS — F33.1 MODERATE EPISODE OF RECURRENT MAJOR DEPRESSIVE DISORDER (HCC): ICD-10-CM

## 2024-04-09 DIAGNOSIS — M51.37 DEGENERATION OF LUMBAR OR LUMBOSACRAL INTERVERTEBRAL DISC: ICD-10-CM

## 2024-04-09 DIAGNOSIS — M79.7 FIBROMYALGIA: ICD-10-CM

## 2024-04-09 DIAGNOSIS — K21.9 GASTROESOPHAGEAL REFLUX DISEASE WITHOUT ESOPHAGITIS: ICD-10-CM

## 2024-04-09 DIAGNOSIS — F51.01 PRIMARY INSOMNIA: ICD-10-CM

## 2024-04-09 DIAGNOSIS — M51.36 DDD (DEGENERATIVE DISC DISEASE), LUMBAR: ICD-10-CM

## 2024-04-09 DIAGNOSIS — M47.26 OSTEOARTHRITIS OF SPINE WITH RADICULOPATHY, LUMBAR REGION: ICD-10-CM

## 2024-04-09 DIAGNOSIS — R25.2 MUSCLE CRAMPING: ICD-10-CM

## 2024-04-09 DIAGNOSIS — M70.61 TROCHANTERIC BURSITIS OF RIGHT HIP: ICD-10-CM

## 2024-04-09 DIAGNOSIS — M47.816 LUMBAR SPONDYLOSIS: ICD-10-CM

## 2024-04-09 DIAGNOSIS — G89.4 CHRONIC PAIN SYNDROME: ICD-10-CM

## 2024-04-09 PROCEDURE — 99213 OFFICE O/P EST LOW 20 MIN: CPT | Performed by: INTERNAL MEDICINE

## 2024-04-09 PROCEDURE — 1090F PRES/ABSN URINE INCON ASSESS: CPT | Performed by: INTERNAL MEDICINE

## 2024-04-09 PROCEDURE — 1123F ACP DISCUSS/DSCN MKR DOCD: CPT | Performed by: INTERNAL MEDICINE

## 2024-04-09 PROCEDURE — G8427 DOCREV CUR MEDS BY ELIG CLIN: HCPCS | Performed by: INTERNAL MEDICINE

## 2024-04-09 PROCEDURE — G8399 PT W/DXA RESULTS DOCUMENT: HCPCS | Performed by: INTERNAL MEDICINE

## 2024-04-09 PROCEDURE — 1036F TOBACCO NON-USER: CPT | Performed by: INTERNAL MEDICINE

## 2024-04-09 PROCEDURE — G8420 CALC BMI NORM PARAMETERS: HCPCS | Performed by: INTERNAL MEDICINE

## 2024-04-09 RX ORDER — TRAZODONE HYDROCHLORIDE 50 MG/1
50-100 TABLET ORAL NIGHTLY
Qty: 60 TABLET | Refills: 1 | Status: SHIPPED | OUTPATIENT
Start: 2024-04-09

## 2024-04-09 RX ORDER — GABAPENTIN 600 MG/1
600 TABLET ORAL 2 TIMES DAILY
Qty: 60 TABLET | Refills: 1 | Status: SHIPPED | OUTPATIENT
Start: 2024-04-09 | End: 2024-06-08

## 2024-04-09 RX ORDER — PANTOPRAZOLE SODIUM 40 MG/1
40 TABLET, DELAYED RELEASE ORAL DAILY
Qty: 30 TABLET | Refills: 1 | Status: SHIPPED | OUTPATIENT
Start: 2024-04-09

## 2024-04-09 RX ORDER — OXYCODONE AND ACETAMINOPHEN 7.5; 325 MG/1; MG/1
1 TABLET ORAL EVERY 6 HOURS PRN
Qty: 100 TABLET | Refills: 0 | Status: SHIPPED | OUTPATIENT
Start: 2024-04-09 | End: 2024-05-07

## 2024-04-09 NOTE — TELEPHONE ENCOUNTER
Isacc from [facility] called in needs a verbal for PT OT and skilled nursing       Call [phone]   9

## 2024-04-09 NOTE — TELEPHONE ENCOUNTER
Called and left detailed message on confidential vm.  Told him we got a call stating pt didn't need OT and to call with clarification

## 2024-04-09 NOTE — TELEPHONE ENCOUNTER
Okay to give verbal order.  I got a call yesterday from [facility] that stated patient did not need OT.

## 2024-04-09 NOTE — PROGRESS NOTES
Lalita Pimentel  1945  [id number]    HISTORY OF PRESENT ILLNESS:  Ms. Pimentel is a 78 y.o. female returns for a follow up visit for multiple medical problems.  Her  presenting problems are   1. Chronic pain syndrome    2. DDD (degenerative disc disease), lumbar    3. Fibromyalgia    4. Gastroesophageal reflux disease without esophagitis    5. Trochanteric bursitis of right hip    6. Osteoarthritis of spine with radiculopathy, lumbar region    7. Muscle cramping    8. Primary insomnia    9. Degeneration of lumbar or lumbosacral intervertebral disc    10. Lumbar spondylosis    11. Moderate episode of recurrent major depressive disorder (HCC)    .    As per information/history obtained from the PADT(patient assessment and documentation tool) -  She complains of pain in the lower back and buttocks She rates the pain 7/10 and describes it as aching.  Pain is made worse by: movement, walking, standing, sitting, bending, lifting.  Current treatment regimen has helped relieve about 30% of the pain.  She denies side effects from the current pain regimen.   Patient reports that since last follow up visit the physical functioning is unchanged, family/social relationships are unchanged, mood is unchanged sleep patterns are unchanged.  Ms. Pimentel states that since starting the treatment with the current regimen the  overall functioning  in the above aspects is  better,Patient denies neurological bowel or bladder. Patient denies misusing/abusing her narcotic pain medications or using any illegal drugs.  There are No indicators for possible drug abuse, addiction or diversion problems, Upon obtaining the medical history from Ms. Pimentel regarding today's office visit for her presenting problems, patient states she has been doing about the same. Ms. Pimentel states she was in the ER, she could not eat or sleep, weakness and ROLY. Patient states she had no changes in medications. She mentions she is using Neurontin 1200 mg along with

## 2024-04-15 NOTE — PROGRESS NOTES
Sree July 1945  [id number]      HISTORY OF PRESENT ILLNESS:  Ms. Nikole Krishnamurthy is a 70 y.o. female returns for a follow up visit for pain management  She has a diagnosis of   1. Chronic pain syndrome    2. Fibromyalgia    3. Lumbar spondylosis    4. Osteoarthritis of spine with radiculopathy, lumbar region    5. Degeneration of lumbar or lumbosacral intervertebral disc    6. Muscle cramping    7. DDD (degenerative disc disease), lumbar    8. Trochanteric bursitis of right hip    9. Moderate episode of recurrent major depressive disorder ([address])    10. Gastroesophageal reflux disease without esophagitis    11. Primary insomnia    . As per information/history obtained from the PADT(patient assessment and documentation tool) -  She complains of pain in the lower back and buttocks  She rates the pain 5/10 and describes it as aching. Pain is made worse by: movement, walking, standing, sitting. She denies any side effects from the current pain regimen. Patient reports that since last follow up visit the physical functioning is unchanged, family/social relationships are unchanged, mood is worse sleep patterns are unchanged. Ms. Nikole Krishnamurthy states that since starting the treatment with the current regimen the  overall functioning  in the above aspects is  unchanged, Patient denies misusing/abusing her narcotic pain medications or using any illegal drugs. There are No indicators for possible drug abuse, addiction or diversion problems. Upon obtaining the medical history from Ms. Nikole Krishnamurthy regarding today's office visit for her presenting problems, patient states she has been doing fair, she states her pain has been somewhat manageable. Ms. Nikole Krishnamurthy is complaining of pain on the left and right side of back. Patient states she is on Percocet along with Mobic 3 times a week. Patient denies any constipation symptoms. She states she is managing her house chores. Patient reports her breathing has been good.        ALLERGIES:
142

## 2024-04-16 ENCOUNTER — CARE COORDINATION (OUTPATIENT)
Dept: CARE COORDINATION | Age: 79
End: 2024-04-16

## 2024-04-16 NOTE — CARE COORDINATION
Ambulatory Care Coordination Note     4/16/2024 12:52 PM     Patient Current Location:  Home: [address] [place] [id number]     patient  outreach attempt by this ACM today to perform care management follow up . ACM was unable to reach the patient  by telephone today; left voice message requesting a return phone call to this ACM.     ACM: Janet Villa RN

## 2024-04-17 DIAGNOSIS — E78.00 HYPERCHOLESTEREMIA: ICD-10-CM

## 2024-04-18 RX ORDER — ATORVASTATIN CALCIUM 80 MG/1
TABLET, FILM COATED ORAL
Qty: 90 TABLET | Refills: 0 | Status: SHIPPED | OUTPATIENT
Start: 2024-04-18

## 2024-04-19 ENCOUNTER — OFFICE VISIT (OUTPATIENT)
Dept: FAMILY MEDICINE CLINIC | Age: 79
End: 2024-04-19
Payer: MEDICARE

## 2024-04-19 VITALS
HEART RATE: 66 BPM | WEIGHT: 141.8 LBS | RESPIRATION RATE: 22 BRPM | DIASTOLIC BLOOD PRESSURE: 70 MMHG | TEMPERATURE: 98.1 F | SYSTOLIC BLOOD PRESSURE: 110 MMHG | BODY MASS INDEX: 24.21 KG/M2 | OXYGEN SATURATION: 91 % | HEIGHT: 64 IN

## 2024-04-19 DIAGNOSIS — K21.9 GASTROESOPHAGEAL REFLUX DISEASE WITHOUT ESOPHAGITIS: ICD-10-CM

## 2024-04-19 DIAGNOSIS — J44.1 COPD WITH ACUTE EXACERBATION (HCC): Primary | ICD-10-CM

## 2024-04-19 DIAGNOSIS — E11.22 TYPE 2 DIABETES MELLITUS WITH STAGE 3A CHRONIC KIDNEY DISEASE, WITHOUT LONG-TERM CURRENT USE OF INSULIN (HCC): ICD-10-CM

## 2024-04-19 DIAGNOSIS — M81.0 AGE-RELATED OSTEOPOROSIS WITHOUT CURRENT PATHOLOGICAL FRACTURE: ICD-10-CM

## 2024-04-19 DIAGNOSIS — J44.9 COPD, SEVERE (HCC): ICD-10-CM

## 2024-04-19 DIAGNOSIS — E78.00 HYPERCHOLESTEREMIA: ICD-10-CM

## 2024-04-19 DIAGNOSIS — N18.31 TYPE 2 DIABETES MELLITUS WITH STAGE 3A CHRONIC KIDNEY DISEASE, WITHOUT LONG-TERM CURRENT USE OF INSULIN (HCC): ICD-10-CM

## 2024-04-19 DIAGNOSIS — F33.1 MODERATE EPISODE OF RECURRENT MAJOR DEPRESSIVE DISORDER (HCC): ICD-10-CM

## 2024-04-19 LAB — HBA1C MFR BLD: 6.2 %

## 2024-04-19 PROCEDURE — G8399 PT W/DXA RESULTS DOCUMENT: HCPCS | Performed by: NURSE PRACTITIONER

## 2024-04-19 PROCEDURE — 1123F ACP DISCUSS/DSCN MKR DOCD: CPT | Performed by: NURSE PRACTITIONER

## 2024-04-19 PROCEDURE — 99214 OFFICE O/P EST MOD 30 MIN: CPT | Performed by: NURSE PRACTITIONER

## 2024-04-19 PROCEDURE — 1090F PRES/ABSN URINE INCON ASSESS: CPT | Performed by: NURSE PRACTITIONER

## 2024-04-19 PROCEDURE — 83036 HEMOGLOBIN GLYCOSYLATED A1C: CPT | Performed by: NURSE PRACTITIONER

## 2024-04-19 PROCEDURE — 3044F HG A1C LEVEL LT 7.0%: CPT | Performed by: NURSE PRACTITIONER

## 2024-04-19 PROCEDURE — G8420 CALC BMI NORM PARAMETERS: HCPCS | Performed by: NURSE PRACTITIONER

## 2024-04-19 PROCEDURE — 1036F TOBACCO NON-USER: CPT | Performed by: NURSE PRACTITIONER

## 2024-04-19 PROCEDURE — G8427 DOCREV CUR MEDS BY ELIG CLIN: HCPCS | Performed by: NURSE PRACTITIONER

## 2024-04-19 PROCEDURE — 3023F SPIROM DOC REV: CPT | Performed by: NURSE PRACTITIONER

## 2024-04-19 RX ORDER — PREDNISONE 20 MG/1
20 TABLET ORAL 2 TIMES DAILY
Qty: 10 TABLET | Refills: 0 | Status: SHIPPED | OUTPATIENT
Start: 2024-04-19 | End: 2024-04-24

## 2024-04-19 RX ORDER — AZITHROMYCIN 250 MG/1
TABLET, FILM COATED ORAL
Qty: 1 PACKET | Refills: 0 | Status: SHIPPED | OUTPATIENT
Start: 2024-04-19 | End: 2024-04-29

## 2024-04-19 SDOH — ECONOMIC STABILITY: INCOME INSECURITY: HOW HARD IS IT FOR YOU TO PAY FOR THE VERY BASICS LIKE FOOD, HOUSING, MEDICAL CARE, AND HEATING?: NOT HARD AT ALL

## 2024-04-19 SDOH — ECONOMIC STABILITY: FOOD INSECURITY: WITHIN THE PAST 12 MONTHS, THE FOOD YOU BOUGHT JUST DIDN'T LAST AND YOU DIDN'T HAVE MONEY TO GET MORE.: NEVER TRUE

## 2024-04-19 SDOH — ECONOMIC STABILITY: FOOD INSECURITY: WITHIN THE PAST 12 MONTHS, YOU WORRIED THAT YOUR FOOD WOULD RUN OUT BEFORE YOU GOT MONEY TO BUY MORE.: NEVER TRUE

## 2024-04-19 ASSESSMENT — ENCOUNTER SYMPTOMS
SHORTNESS OF BREATH: 1
EYE ITCHING: 0
WHEEZING: 1
CHEST TIGHTNESS: 0
SINUS PAIN: 0
COUGH: 1
ABDOMINAL PAIN: 0

## 2024-04-19 NOTE — PROGRESS NOTES
disease  Continue Protonix daily.  Advised to avoid foods associated with acid reflux.    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  To stay hydrated with water due to CKD and avoid NSAIDs    Osteoporosis-   Continue calcium and vitamin D supplements and prolia injection every 6 months   Last DEXA scan 12/2023. Repeat in 2 years.       Moderate episode of recurrent major depressive disorder (HCC)  Stable. Continue cymbalta 60 mg BID        Return in about 3 months (around 7/19/2024), or if symptoms worsen or fail to improve, for chronic conditions.

## 2024-04-23 ENCOUNTER — CARE COORDINATION (OUTPATIENT)
Dept: CARE COORDINATION | Age: 79
End: 2024-04-23

## 2024-04-23 SDOH — HEALTH STABILITY: PHYSICAL HEALTH: ON AVERAGE, HOW MANY MINUTES DO YOU ENGAGE IN EXERCISE AT THIS LEVEL?: 20 MIN

## 2024-04-23 SDOH — HEALTH STABILITY: PHYSICAL HEALTH: ON AVERAGE, HOW MANY DAYS PER WEEK DO YOU ENGAGE IN MODERATE TO STRENUOUS EXERCISE (LIKE A BRISK WALK)?: 6 DAYS

## 2024-04-23 ASSESSMENT — SOCIAL DETERMINANTS OF HEALTH (SDOH)
IN A TYPICAL WEEK, HOW MANY TIMES DO YOU TALK ON THE PHONE WITH FAMILY, FRIENDS, OR NEIGHBORS?: MORE THAN THREE TIMES A WEEK
HOW OFTEN DO YOU ATTENT MEETINGS OF THE CLUB OR ORGANIZATION YOU BELONG TO?: NEVER
HOW OFTEN DO YOU ATTEND CHURCH OR RELIGIOUS SERVICES?: NEVER
ARE YOU MARRIED, WIDOWED, DIVORCED, SEPARATED, NEVER MARRIED, OR LIVING WITH A PARTNER?: LIVING WITH PARTNER
DO YOU BELONG TO ANY CLUBS OR ORGANIZATIONS SUCH AS CHURCH GROUPS UNIONS, FRATERNAL OR ATHLETIC GROUPS, OR SCHOOL GROUPS?: NO
HOW OFTEN DO YOU GET TOGETHER WITH FRIENDS OR RELATIVES?: MORE THAN THREE TIMES A WEEK

## 2024-04-23 NOTE — CARE COORDINATION
Ambulatory Care Coordination Note     2024 10:32 AM     Patient Current Location:  Home: [address] [place] [id number]     ACM contacted the patient by telephone. Verified name and  with patient as identifiers.         ACM: Janet Villa RN     Challenges to be reviewed by the provider   Additional needs identified to be addressed with provider No  none               Method of communication with provider: none.    Care Summary Note: CC f/u completed.  Pt was pleasant and engaged with no s/s of disease exacerbation.  Pt taking medications as prescribed.   Pt education for COPD Zones completed.  Pt educated on the importance of checking vitals daily.  RPM vitals below:        SDOH completed, no new needs identified.  Pt has not heard from Palliative Care yet, ref made in PCP office.  Pt has no PRN ACM needs at this time.     Offered patient enrollment in the Remote Patient Monitoring (RPM) program for in-home monitoring: Yes, patient already enrolled.     Assessments Completed:   COPD Assessment    Is the patient able to verbalize Rescue vs. Long Acting medications?: Yes  Does the patient have a nebulizer?: No  Does the patient use a space with inhaled medications?: No            Symptoms:               Care Planning:   Education Documentation  No documentation found.  Education Comments  No comments found.     ,    Goals Addressed                   This Visit's Progress     Medication Management   On track     I will take my medication as directed.  I will notify my provider of any problems with medications, like adverse effects or side effects.  I will notify my provider/Care Coordinator if I am unable to afford my medications.  I will notify my provider for advice before I stop taking any of my medication.    Barriers: lack of education  Plan for overcoming my barriers: support and resources  Confidence: 9/10  Anticipated Goal Completion Date: 2024               Advance Care Planning:

## 2024-04-29 ENCOUNTER — TELEPHONE (OUTPATIENT)
Dept: FAMILY MEDICINE CLINIC | Age: 79
End: 2024-04-29

## 2024-04-29 DIAGNOSIS — M81.0 AGE-RELATED OSTEOPOROSIS WITHOUT CURRENT PATHOLOGICAL FRACTURE: ICD-10-CM

## 2024-04-29 RX ORDER — SODIUM CHLORIDE 9 MG/ML
INJECTION, SOLUTION INTRAVENOUS CONTINUOUS
OUTPATIENT
Start: 2024-05-13

## 2024-04-29 RX ORDER — ACETAMINOPHEN 325 MG/1
650 TABLET ORAL
OUTPATIENT
Start: 2024-05-13

## 2024-04-29 RX ORDER — ALBUTEROL SULFATE 90 UG/1
4 AEROSOL, METERED RESPIRATORY (INHALATION) PRN
OUTPATIENT
Start: 2024-05-13

## 2024-04-29 RX ORDER — DIPHENHYDRAMINE HYDROCHLORIDE 50 MG/ML
50 INJECTION INTRAMUSCULAR; INTRAVENOUS
OUTPATIENT
Start: 2024-05-13

## 2024-04-29 RX ORDER — ONDANSETRON 2 MG/ML
8 INJECTION INTRAMUSCULAR; INTRAVENOUS
OUTPATIENT
Start: 2024-05-13

## 2024-04-29 RX ORDER — EPINEPHRINE 1 MG/ML
0.3 INJECTION, SOLUTION, CONCENTRATE INTRAVENOUS PRN
OUTPATIENT
Start: 2024-05-13

## 2024-04-29 NOTE — TELEPHONE ENCOUNTER
Ijeoma from [facility] is calling requesting a PROLIA order for this patient     Ijeoma can be reached at [phone]     Order is filled out and ready to sign.

## 2024-04-29 NOTE — TELEPHONE ENCOUNTER
Ijeoma from [facility] is calling requesting a PROLIA order for this patient    Ijeoma can be reached at [phone]

## 2024-05-03 DIAGNOSIS — G89.4 CHRONIC PAIN SYNDROME: ICD-10-CM

## 2024-05-03 DIAGNOSIS — F33.1 MODERATE EPISODE OF RECURRENT MAJOR DEPRESSIVE DISORDER (HCC): ICD-10-CM

## 2024-05-03 RX ORDER — DULOXETIN HYDROCHLORIDE 60 MG/1
CAPSULE, DELAYED RELEASE ORAL DAILY
Qty: 90 CAPSULE | Refills: 0 | Status: SHIPPED | OUTPATIENT
Start: 2024-05-03

## 2024-05-03 RX ORDER — DULOXETIN HYDROCHLORIDE 30 MG/1
CAPSULE, DELAYED RELEASE ORAL
Qty: 90 CAPSULE | Refills: 0 | Status: SHIPPED | OUTPATIENT
Start: 2024-05-03

## 2024-05-07 ENCOUNTER — OFFICE VISIT (OUTPATIENT)
Dept: PAIN MANAGEMENT | Age: 79
End: 2024-05-07
Payer: MEDICARE

## 2024-05-07 VITALS
DIASTOLIC BLOOD PRESSURE: 78 MMHG | HEART RATE: 93 BPM | SYSTOLIC BLOOD PRESSURE: 150 MMHG | BODY MASS INDEX: 24.36 KG/M2 | OXYGEN SATURATION: 94 % | WEIGHT: 142 LBS

## 2024-05-07 DIAGNOSIS — M51.36 DDD (DEGENERATIVE DISC DISEASE), LUMBAR: ICD-10-CM

## 2024-05-07 DIAGNOSIS — R25.2 MUSCLE CRAMPING: ICD-10-CM

## 2024-05-07 DIAGNOSIS — G89.4 CHRONIC PAIN SYNDROME: ICD-10-CM

## 2024-05-07 DIAGNOSIS — M70.61 TROCHANTERIC BURSITIS OF RIGHT HIP: ICD-10-CM

## 2024-05-07 DIAGNOSIS — M47.26 OSTEOARTHRITIS OF SPINE WITH RADICULOPATHY, LUMBAR REGION: ICD-10-CM

## 2024-05-07 DIAGNOSIS — M51.37 DEGENERATION OF LUMBAR OR LUMBOSACRAL INTERVERTEBRAL DISC: ICD-10-CM

## 2024-05-07 DIAGNOSIS — M47.816 LUMBAR SPONDYLOSIS: ICD-10-CM

## 2024-05-07 DIAGNOSIS — M79.7 FIBROMYALGIA: ICD-10-CM

## 2024-05-07 PROCEDURE — 1036F TOBACCO NON-USER: CPT | Performed by: INTERNAL MEDICINE

## 2024-05-07 PROCEDURE — 99213 OFFICE O/P EST LOW 20 MIN: CPT | Performed by: INTERNAL MEDICINE

## 2024-05-07 PROCEDURE — G8427 DOCREV CUR MEDS BY ELIG CLIN: HCPCS | Performed by: INTERNAL MEDICINE

## 2024-05-07 PROCEDURE — 1123F ACP DISCUSS/DSCN MKR DOCD: CPT | Performed by: INTERNAL MEDICINE

## 2024-05-07 PROCEDURE — G8399 PT W/DXA RESULTS DOCUMENT: HCPCS | Performed by: INTERNAL MEDICINE

## 2024-05-07 PROCEDURE — 1090F PRES/ABSN URINE INCON ASSESS: CPT | Performed by: INTERNAL MEDICINE

## 2024-05-07 PROCEDURE — G8420 CALC BMI NORM PARAMETERS: HCPCS | Performed by: INTERNAL MEDICINE

## 2024-05-07 RX ORDER — GABAPENTIN 600 MG/1
600 TABLET ORAL 2 TIMES DAILY
Qty: 60 TABLET | Refills: 1 | Status: SHIPPED | OUTPATIENT
Start: 2024-05-07 | End: 2024-07-06

## 2024-05-07 RX ORDER — PANTOPRAZOLE SODIUM 40 MG/1
40 TABLET, DELAYED RELEASE ORAL DAILY
Qty: 30 TABLET | Refills: 1 | Status: SHIPPED | OUTPATIENT
Start: 2024-05-07

## 2024-05-07 RX ORDER — TRAZODONE HYDROCHLORIDE 50 MG/1
50-100 TABLET ORAL NIGHTLY
Qty: 60 TABLET | Refills: 1 | Status: SHIPPED | OUTPATIENT
Start: 2024-05-07

## 2024-05-07 RX ORDER — OXYCODONE AND ACETAMINOPHEN 7.5; 325 MG/1; MG/1
1 TABLET ORAL EVERY 6 HOURS PRN
Qty: 100 TABLET | Refills: 0 | Status: SHIPPED | OUTPATIENT
Start: 2024-05-07 | End: 2024-06-04

## 2024-05-07 NOTE — PROGRESS NOTES
Lalita Pimentel  1945  [id number]    HISTORY OF PRESENT ILLNESS:  Ms. Pimentel is a 78 y.o. female returns for a follow up visit for multiple medical problems.  Her  presenting problems are   1. Chronic pain syndrome    2. Gastroesophageal reflux disease without esophagitis    3. Muscle cramping    4. Lumbar spondylosis    5. DDD (degenerative disc disease), lumbar    6. Trochanteric bursitis of right hip    7. Primary insomnia    8. Moderate episode of recurrent major depressive disorder (HCC)    9. Fibromyalgia    10. Osteoarthritis of spine with radiculopathy, lumbar region    11. Degeneration of lumbar or lumbosacral intervertebral disc    .    As per information/history obtained from the PADT(patient assessment and documentation tool) -  She complains of pain in the lower back with radiation to the buttocks and hips Right She rates the pain 7/10 and describes it as aching.  Pain is made worse by: walking, standing, sitting, lifting.  Current treatment regimen has helped relieve about 40% of the pain.  She denies side effects from the current pain regimen.   Patient reports that since last follow up visit the physical functioning is unchanged, family/social relationships are unchanged, mood is unchanged sleep patterns are unchanged.  Ms. Pimentel states that since starting the treatment with the current regimen the  overall functioning  in the above aspects is  better,Patient denies neurological bowel or bladder. Patient denies misusing/abusing her narcotic pain medications or using any illegal drugs.  There are No indicators for possible drug abuse, addiction or diversion problems, Upon obtaining the medical history from Ms. Pimentel regarding today's office visit for her presenting problems, patient states she has been doing about the same. She states the pain is worse in the back than the legs. Ms. Pimentel reports she is managing her housework. She mentions she is using Percocet 3-4 per day along with Neurontin

## 2024-05-09 ENCOUNTER — CARE COORDINATION (OUTPATIENT)
Dept: CASE MANAGEMENT | Age: 79
End: 2024-05-09

## 2024-05-09 NOTE — CARE COORDINATION
-Remote Alert Monitoring Note      Date/Time:  2024 11:16 AM  Patient Current Location: [place]  Verified patients name and  as identifiers.    Rpm alert to be reviewed by the provider                LPN contacted patient by telephone regarding red alert received for  BP: 89/87.   Background: Pt enrolled for COPD     Refer to 911 immediately if:  Patient unresponsive or unable to provide history  Change in cognition or sudden confusion  Patient unable to respond in complete sentences  Intense chest pain/tightness  Any concern for any clinical emergency  Red Alert: Provider response time of 1 hr required for any red alert requiring intervention  Yellow Alert: Provider response time of 3hr required for any escalated yellow alert     O2 Triage  Are you having any Chest Pain? no   Are you having any Shortness of Breath? no   Swelling in your hands or feet? no     Are you having any other health concerns or issues? no           Clinical Interventions:  LPN contacted pt in regard to RPM red alert for PO of  89%. Pt denied any worrisome symptoms at this time. Pt states that this pulse ox is not abnormal for her. Pt rechecked, PO is now 96%.        Plan/Follow Up: Will continue to review, monitor and address alerts with follow up based on severity of symptoms and risk factors.           Sapna Turner LPN, PCC  PH: [phone]

## 2024-05-14 ENCOUNTER — CARE COORDINATION (OUTPATIENT)
Dept: CARE COORDINATION | Age: 79
End: 2024-05-14

## 2024-05-14 NOTE — CARE COORDINATION
Ambulatory Care Coordination Note  2024    Patient Current Location:  Home: [address] [place] [address] [address]  [facility] [address]     ACM contacted the patient by telephone. Verified name and  with patient as identifiers. Provided introduction to self, and explanation of the ACM role.     Challenges to be reviewed by the provider   Additional needs identified to be addressed with provider: No  none               Method of communication with provider: none.    ACM: Ashley Jones     ACM spoke with pt regarding COPD. Pt has no concerns at this time. Patient is taking medications as prescribed and is enrolled in the RPM program. Pt has [facility] services that she is utilizing. Pt denies any signs of COPD exacerbation. ACM to f/u with pt in 3 weeks. Pt verbalizes understanding of signs and symptoms of worsening disease progression and  the steps to seek help.    Plan:    F/u with patient in 3 weeks  Complete SDOH  Advance directive review      Offered patient enrollment in the Remote Patient Monitoring (RPM) program for in-home monitoring: Yes, patient enrolled; current status is activated and monitoring.    Lab Results       None                 Goals Addressed                   This Visit's Progress     Medication Management   On track     I will take my medication as directed.  I will notify my provider of any problems with medications, like adverse effects or side effects.  I will notify my provider/Care Coordinator if I am unable to afford my medications.  I will notify my provider for advice before I stop taking any of my medication.    Barriers: lack of education  Plan for overcoming my barriers: support and resources  Confidence: 9/10  Anticipated Goal Completion Date: 2024              Future Appointments   Date Time Provider Department Center   2024 10:00 AM [place] ONC ROOM 05 [facility]   2024 12:15 PM Babatunde Leach MD [facility]   2024 11:00 AM Antelmo Garza MD  [facility]

## 2024-05-16 ENCOUNTER — HOSPITAL ENCOUNTER (OUTPATIENT)
Dept: INFUSION THERAPY | Age: 79
Setting detail: INFUSION SERIES
Discharge: HOME | End: 2024-05-16
Payer: MEDICARE

## 2024-05-16 VITALS
DIASTOLIC BLOOD PRESSURE: 57 MMHG | HEART RATE: 96 BPM | TEMPERATURE: 97.9 F | OXYGEN SATURATION: 93 % | SYSTOLIC BLOOD PRESSURE: 131 MMHG | RESPIRATION RATE: 18 BRPM

## 2024-05-16 DIAGNOSIS — M81.0 SENILE OSTEOPOROSIS: Primary | ICD-10-CM

## 2024-05-16 PROCEDURE — 96372 THER/PROPH/DIAG INJ SC/IM: CPT

## 2024-05-16 PROCEDURE — 6360000002 HC RX W HCPCS: Performed by: NURSE PRACTITIONER

## 2024-05-16 RX ORDER — DIPHENHYDRAMINE HYDROCHLORIDE 50 MG/ML
50 INJECTION INTRAMUSCULAR; INTRAVENOUS
Status: CANCELLED | OUTPATIENT
Start: 2024-11-14

## 2024-05-16 RX ORDER — EPINEPHRINE 1 MG/ML
0.3 INJECTION, SOLUTION, CONCENTRATE INTRAVENOUS PRN
Status: CANCELLED | OUTPATIENT
Start: 2024-11-14

## 2024-05-16 RX ORDER — ACETAMINOPHEN 325 MG/1
650 TABLET ORAL
Status: CANCELLED | OUTPATIENT
Start: 2024-11-14

## 2024-05-16 RX ORDER — ALBUTEROL SULFATE 90 UG/1
4 AEROSOL, METERED RESPIRATORY (INHALATION) PRN
Status: CANCELLED | OUTPATIENT
Start: 2024-11-14

## 2024-05-16 RX ORDER — ONDANSETRON 2 MG/ML
8 INJECTION INTRAMUSCULAR; INTRAVENOUS
Status: CANCELLED | OUTPATIENT
Start: 2024-11-14

## 2024-05-16 RX ORDER — SODIUM CHLORIDE 9 MG/ML
INJECTION, SOLUTION INTRAVENOUS CONTINUOUS
Status: CANCELLED | OUTPATIENT
Start: 2024-11-14

## 2024-05-16 RX ADMIN — DENOSUMAB 60 MG: 60 INJECTION SUBCUTANEOUS at 10:42

## 2024-05-16 NOTE — PROGRESS NOTES
Outpatient Infusion Center  [facility]     Prolia Visit    NAME:  Lalita Pimentel  YOB: 1945  MEDICAL RECORD NUMBER:  [id number]  Episode Date:  5/16/2024    Patient arrived to Outpatient Infusion Center   [] per wheelchair   [x] ambulatory     Alert and oriented x 4. Patient reports feeling well since her last visit and denies side effects/adverse effects from previous Prolia injections.     Is this the patient's first Prolia Injection? No     Date of last Prolia Injection ? November 9, 2023.     Did the patient experience any adverse reactions to Prolia Injection? No    Any recent oral or dental surgery? No    Any recent active fever, infections and/or illnesses? No    Patient has history of pathological fracture? No    Patient has had a recent fracture due to trauma or injury? No    Patient has had a recent orthopedic surgery or procedure done? No    Approximate date of last Dexa scan? December 8, 2023       BP (!) 131/57   Pulse 96   Temp 97.9 °F (36.6 °C) (Oral)   Resp 18   SpO2 93%     Current Lab Data:    Calcium:    Lab Results   Component Value Date/Time    CALCIUM 9.5 04/03/2024 01:06 PM       Patient Currently taking Calcium Supplements? Yes - takes Calcium + Vitamin D daily     Prolia administered: Yes    Prolia dosage: 60 mg was administered slowly subcutaneously into the right upper arm.      Response to treatment:  Well tolerated by patient.     Education: Verbal and written discharge instructions reviewed with patient. Verbalized understanding. Written copy given via AVS     Follow up: Due for next dose of Prolia after November 16, 2024.     Electronically signed by Ania Broussard RN on 5/16/2024 at 11:08 AM

## 2024-05-16 NOTE — DISCHARGE INSTRUCTIONS
Outpatient Infusion Discharge Instructions  [facility]  [address] [place], [place] [address]  Telephone: [phone]      FAX [phone]    NAME:  Lalita Pimentel  YOB: 1945  MEDICAL RECORD NUMBER:  [id number]  DATE:  May 16, 2024    Reason for Outpatient Infusion Visit: Prolia Injection    If you develop any these symptoms please contact you Doctor    [x] Nausea and/or vomiting  [x] Swelling, redness, and/or bleeding at injection or IV site    [x] Fever or chills  [x] Rash or itching   [x] Shortness of breath  [x] Please review After Visit Summary (AVS) information on: Prolia and osteoporosis   [x] Other: Next visit due November 17, 2024      Outpatient Infusion Center Information: Should you experience any significant changes in your health or have questions about your care please contact the [facility] at [phone] MONDAY - FRIDAY 8:00 am - 4:00 pm.  If you need help outside these hours and cannot wait until we are again available, contact your Primary Care Physician or go to the hospital emergency room.       Electronically signed by Ania Broussard RN on 5/16/2024 at 10:48 AM

## 2024-05-29 ENCOUNTER — TELEPHONE (OUTPATIENT)
Dept: FAMILY MEDICINE CLINIC | Age: 79
End: 2024-05-29

## 2024-05-29 NOTE — TELEPHONE ENCOUNTER
Ivonne from [facility] called wanting to let Belkis know that this patient was discharged from home care    Ivonne can be reached at [phone]    FYI

## 2024-05-29 NOTE — TELEPHONE ENCOUNTER
Ivonne from [facility] called wanting to let Belkis know that this patient was discharged from home care     Ivonne can be reached at [phone]     FYI

## 2024-05-30 ENCOUNTER — CARE COORDINATION (OUTPATIENT)
Dept: CARE COORDINATION | Age: 79
End: 2024-05-30

## 2024-05-30 ASSESSMENT — LIFESTYLE VARIABLES: HOW OFTEN DO YOU HAVE A DRINK CONTAINING ALCOHOL: NEVER

## 2024-05-30 NOTE — CARE COORDINATION
Ambulatory Care Coordination Note  2024    Patient Current Location:  Home: [address] [place] [address] [address]  [facility] [address]     ACM contacted the patient by telephone. Verified name and  with patient as identifiers. Provided introduction to self, and explanation of the ACM role.       Method of communication with provider: none.    ACM: Janet Villa RN    CC f/u completed.  Pt was pleasant and engaged with no s/s of disease exacerbation.  Pt taking medications as prescribed.   SDOH completed, pt has no new needs.  ACP conversation completed.  Pt is meeting w/[facility] to set up AD's.  ACM confirmed decision makers.  COPD Zones discussed and pt has no questions.  RPM vitals below:      Plan   F/U 3 weeks  Assess for needs  Assess for readiness to graduate from RPM    Offered patient enrollment in the Remote Patient Monitoring (RPM) program for in-home monitoring: Yes, patient enrolled; current status is activated and monitoring.    Lab Results       None                 Goals Addressed                   This Visit's Progress     Medication Management   On track     I will take my medication as directed.  I will notify my provider of any problems with medications, like adverse effects or side effects.  I will notify my provider/Care Coordinator if I am unable to afford my medications.  I will notify my provider for advice before I stop taking any of my medication.    Barriers: lack of education  Plan for overcoming my barriers: support and resources  Confidence: 9/10  Anticipated Goal Completion Date: 2024              Future Appointments   Date Time Provider Department Center   2024 12:15 PM Babatunde Leach MD [facility]   2024 11:00 AM [facility]   2024  1:00 PM [facility]   2024 11:00 AM Belkis Silva, APRN - CNP [facility]    and   COPD Assessment    Is the patient able to verbalize Rescue vs. Long Acting

## 2024-05-30 NOTE — ACP (ADVANCE CARE PLANNING)
Advance Care Planning     General Advance Care Planning (ACP) Conversation    Date of Conversation: 5/30/2024  Conducted with: Patient with Decision Making Capacity  Other persons present: None    Healthcare Decision Maker:    Primary Decision Maker: Domi Burr - Isadora - [phone]    Secondary Decision Maker: Peggy James - [phone]  Click here to complete Healthcare Decision Makers including selection of the Healthcare Decision Maker Relationship (ie \"Primary\").  Today we documented Decision Maker(s) consistent with Legal Next of Kin hierarchy.    Content/Action Overview:  Pt is meeting w/[facility] to discuss ACP options  Reviewed DNR/DNI and patient         Length of Voluntary ACP Conversation in minutes:  <16 minutes (Non-Billable)    Janet Villa RN

## 2024-06-04 ENCOUNTER — OFFICE VISIT (OUTPATIENT)
Dept: PAIN MANAGEMENT | Age: 79
End: 2024-06-04
Payer: MEDICARE

## 2024-06-04 VITALS
HEART RATE: 82 BPM | BODY MASS INDEX: 24.88 KG/M2 | DIASTOLIC BLOOD PRESSURE: 73 MMHG | WEIGHT: 145 LBS | SYSTOLIC BLOOD PRESSURE: 105 MMHG | OXYGEN SATURATION: 91 %

## 2024-06-04 DIAGNOSIS — M70.61 TROCHANTERIC BURSITIS OF RIGHT HIP: ICD-10-CM

## 2024-06-04 DIAGNOSIS — M51.37 DEGENERATION OF LUMBAR OR LUMBOSACRAL INTERVERTEBRAL DISC: ICD-10-CM

## 2024-06-04 DIAGNOSIS — G89.4 CHRONIC PAIN SYNDROME: ICD-10-CM

## 2024-06-04 DIAGNOSIS — M47.26 OSTEOARTHRITIS OF SPINE WITH RADICULOPATHY, LUMBAR REGION: ICD-10-CM

## 2024-06-04 DIAGNOSIS — M51.36 DDD (DEGENERATIVE DISC DISEASE), LUMBAR: ICD-10-CM

## 2024-06-04 DIAGNOSIS — M47.816 LUMBAR SPONDYLOSIS: ICD-10-CM

## 2024-06-04 DIAGNOSIS — M79.7 FIBROMYALGIA: ICD-10-CM

## 2024-06-04 PROCEDURE — 1090F PRES/ABSN URINE INCON ASSESS: CPT | Performed by: INTERNAL MEDICINE

## 2024-06-04 PROCEDURE — 1036F TOBACCO NON-USER: CPT | Performed by: INTERNAL MEDICINE

## 2024-06-04 PROCEDURE — G8420 CALC BMI NORM PARAMETERS: HCPCS | Performed by: INTERNAL MEDICINE

## 2024-06-04 PROCEDURE — 99213 OFFICE O/P EST LOW 20 MIN: CPT | Performed by: INTERNAL MEDICINE

## 2024-06-04 PROCEDURE — G8399 PT W/DXA RESULTS DOCUMENT: HCPCS | Performed by: INTERNAL MEDICINE

## 2024-06-04 PROCEDURE — 1123F ACP DISCUSS/DSCN MKR DOCD: CPT | Performed by: INTERNAL MEDICINE

## 2024-06-04 PROCEDURE — G8427 DOCREV CUR MEDS BY ELIG CLIN: HCPCS | Performed by: INTERNAL MEDICINE

## 2024-06-04 RX ORDER — PANTOPRAZOLE SODIUM 40 MG/1
40 TABLET, DELAYED RELEASE ORAL DAILY PRN
Qty: 30 TABLET | Refills: 1 | Status: SHIPPED | OUTPATIENT
Start: 2024-06-04

## 2024-06-04 RX ORDER — OXYCODONE AND ACETAMINOPHEN 7.5; 325 MG/1; MG/1
1 TABLET ORAL EVERY 6 HOURS PRN
Qty: 100 TABLET | Refills: 0 | Status: SHIPPED | OUTPATIENT
Start: 2024-06-04 | End: 2024-07-02

## 2024-06-04 RX ORDER — GABAPENTIN 600 MG/1
600 TABLET ORAL 2 TIMES DAILY
Qty: 60 TABLET | Refills: 1 | Status: SHIPPED | OUTPATIENT
Start: 2024-06-04 | End: 2024-08-03

## 2024-06-04 RX ORDER — TRAZODONE HYDROCHLORIDE 50 MG/1
50-100 TABLET ORAL NIGHTLY
Qty: 60 TABLET | Refills: 1 | Status: SHIPPED | OUTPATIENT
Start: 2024-06-04

## 2024-06-04 NOTE — PROGRESS NOTES
side effects of these medications were also explained to the patient.    -Continue with Percocet 3-4 per day  -She was advised to increase fluids ( 5-7  glasses of fluid daily), limit caffeine, avoid cheese products, increase dietary fiber, increase activity and exercise as tolerated and relax regularly and enjoy meals   -Decrease Protonix to PRN   -Walking/ROM exercises as tolerate     Current Outpatient Medications   Medication Sig Dispense Refill    gabapentin (NEURONTIN) 600 MG tablet Take 1 tablet by mouth 2 times daily for 60 days. 60 tablet 1    oxyCODONE-acetaminophen (PERCOCET) 7.5-325 MG per tablet Take 1 tablet by mouth every 6 hours as needed for Pain (max 3-4) for up to 28 days. 100 tablet 0    pantoprazole (PROTONIX) 40 MG tablet Take 1 tablet by mouth daily as needed (GERD symptoms) 30 tablet 1    traZODone (DESYREL) 50 MG tablet Take 1-2 tablets by mouth nightly 60 tablet 1    DULoxetine (CYMBALTA) 30 MG extended release capsule TAKE 1 CAPSULE BY MOUTH IN THE EVENING 90 capsule 0    DULoxetine (CYMBALTA) 60 MG extended release capsule TAKE 1 CAPSULE BY MOUTH EVERY DAY 90 capsule 0    atorvastatin (LIPITOR) 80 MG tablet TAKE 1 TABLET BY MOUTH EVERY DAY FOR CHOLESTEROL 90 tablet 0    ondansetron (ZOFRAN-ODT) 4 MG disintegrating tablet Take 1 tablet by mouth 3 times daily as needed for Nausea or Vomiting 21 tablet 0    albuterol sulfate HFA (PROAIR HFA) 108 (90 Base) MCG/ACT inhaler Inhale 2 puffs into the lungs every 6 hours as needed for Wheezing or Shortness of Breath 18 g 11    fluticasone furoate-vilanterol (BREO ELLIPTA) 200-25 MCG/ACT AEPB inhaler Inhale 1 puff into the lungs daily 1 each 11    naloxone (NARCAN) 4 MG/0.1ML LIQD nasal spray 1 spray by Nasal route as needed for Opioid Reversal 1 each 0    ipratropium-albuterol (DUONEB) 0.5-2.5 (3) MG/3ML SOLN nebulizer solution Take 1 aerosol every 4-6 hours as needed for shortness of breath coughing and wheezing 360 mL 0    aspirin 81 MG EC tablet

## 2024-06-06 ENCOUNTER — CARE COORDINATION (OUTPATIENT)
Dept: CASE MANAGEMENT | Age: 79
End: 2024-06-06

## 2024-06-06 NOTE — CARE COORDINATION
.RPM alert for no pulse ox x 2 days.  [employer] via [place] MESSAGE sent to pt. Lalita Pimentel , I am a nurse with the remote patient monitoring team;  reaching out to you today to remind you to please take your vitals. Important: Please try to take your vitals each day before 12pm. This ensures the monitoring team will have time to connect with your providers and get back to you with any new orders or instructions.    Enrolled in RPM FOR COPD

## 2024-06-20 ENCOUNTER — CARE COORDINATION (OUTPATIENT)
Dept: CARE COORDINATION | Age: 79
End: 2024-06-20

## 2024-06-20 ENCOUNTER — CARE COORDINATION (OUTPATIENT)
Dept: PRIMARY CARE CLINIC | Age: 79
End: 2024-06-20

## 2024-06-20 DIAGNOSIS — J44.9 COPD, SEVERE (HCC): Primary | ICD-10-CM

## 2024-06-20 NOTE — CARE COORDINATION
RPM Kit Return    Lalita Pimentel  6/20/24    Care Coordination  placed call to patient to arrange RPM kit  through [employer].     CCSS spoke to Patient reviewed with Patient how to pack equipment in original packing. Patient aware [employer] will  equipment in 2-4 days.   All questions and concerns answered.

## 2024-06-20 NOTE — CARE COORDINATION
Ambulatory Care Coordination Note     2024 11:25 AM     Patient Current Location:  Home: [address] [place] [id number]     ACM contacted the patient by telephone. Verified name and  with patient as identifiers.         ACM: Janet Villa RN     Challenges to be reviewed by the provider   Additional needs identified to be addressed with provider No  none               Method of communication with provider: none.    Care Summary Note: CC f/u completed.  Pt was pleasant and engaged with no s/s of disease exacerbation.  Pt taking medications as prescribed.   Pt is engaging in self care.  Pt feels ready to return RPM equipment.  ACM recommending graduation.  Order placed.  Vtials below:        Offered patient enrollment in the Remote Patient Monitoring (RPM) program for in-home monitoring: Yes, patient enrolled; current status is activated and monitoring.     Assessments Completed:   COPD Assessment    Is the patient able to verbalize Rescue vs. Long Acting medications?: Yes  Does the patient have a nebulizer?: No  Does the patient use a space with inhaled medications?: No     No patient-reported symptoms         Symptoms:  None: Yes      Symptom course: stable  Change in chronic cough?: No/At Baseline  Change in sputum?: No/At Baseline  Self Monitoring - SaO2: Yes  Baseline SaO2 Readin          Medications Reviewed:   Patient denies any changes with medications and reports taking all medications as prescribed.    Advance Care Planning:   Reviewed during previous call      Care Planning:    Goals Addressed                   This Visit's Progress     Medication Management   On track     I will take my medication as directed.  I will notify my provider of any problems with medications, like adverse effects or side effects.  I will notify my provider/Care Coordinator if I am unable to afford my medications.  I will notify my provider for advice before I stop taking any of my

## 2024-06-20 NOTE — PROGRESS NOTES
Remote Patient Order Discontinued    Received request from Janet Villa RN   to discontinue order for remote patient monitoring of COPD and order completed.

## 2024-07-02 ENCOUNTER — OFFICE VISIT (OUTPATIENT)
Dept: PAIN MANAGEMENT | Age: 79
End: 2024-07-02
Payer: MEDICARE

## 2024-07-02 ENCOUNTER — OFFICE VISIT (OUTPATIENT)
Dept: FAMILY MEDICINE CLINIC | Age: 79
End: 2024-07-02
Payer: MEDICARE

## 2024-07-02 VITALS
SYSTOLIC BLOOD PRESSURE: 147 MMHG | HEART RATE: 83 BPM | WEIGHT: 143.4 LBS | OXYGEN SATURATION: 93 % | DIASTOLIC BLOOD PRESSURE: 83 MMHG | BODY MASS INDEX: 24.48 KG/M2 | HEIGHT: 64 IN

## 2024-07-02 VITALS
DIASTOLIC BLOOD PRESSURE: 84 MMHG | OXYGEN SATURATION: 100 % | BODY MASS INDEX: 24.53 KG/M2 | WEIGHT: 143 LBS | HEART RATE: 94 BPM | SYSTOLIC BLOOD PRESSURE: 150 MMHG

## 2024-07-02 DIAGNOSIS — M47.816 LUMBAR SPONDYLOSIS: ICD-10-CM

## 2024-07-02 DIAGNOSIS — R30.0 DYSURIA: ICD-10-CM

## 2024-07-02 DIAGNOSIS — N30.01 ACUTE CYSTITIS WITH HEMATURIA: ICD-10-CM

## 2024-07-02 DIAGNOSIS — M70.61 TROCHANTERIC BURSITIS OF RIGHT HIP: ICD-10-CM

## 2024-07-02 DIAGNOSIS — M79.7 FIBROMYALGIA: ICD-10-CM

## 2024-07-02 DIAGNOSIS — R25.2 MUSCLE CRAMPING: ICD-10-CM

## 2024-07-02 DIAGNOSIS — R35.0 URINARY FREQUENCY: Primary | ICD-10-CM

## 2024-07-02 DIAGNOSIS — M47.26 OSTEOARTHRITIS OF SPINE WITH RADICULOPATHY, LUMBAR REGION: ICD-10-CM

## 2024-07-02 DIAGNOSIS — M51.37 DEGENERATION OF LUMBAR OR LUMBOSACRAL INTERVERTEBRAL DISC: ICD-10-CM

## 2024-07-02 DIAGNOSIS — G89.4 CHRONIC PAIN SYNDROME: ICD-10-CM

## 2024-07-02 DIAGNOSIS — M51.36 DDD (DEGENERATIVE DISC DISEASE), LUMBAR: ICD-10-CM

## 2024-07-02 LAB
BILIRUBIN, POC: NORMAL
BLOOD URINE, POC: NORMAL
CLARITY, POC: CLEAR
COLOR, POC: YELLOW
GLUCOSE URINE, POC: NORMAL
KETONES, POC: NORMAL
LEUKOCYTE EST, POC: NORMAL
NITRITE, POC: NORMAL
PH, POC: 7
PROTEIN, POC: NORMAL
SPECIFIC GRAVITY, POC: 1. 015
UROBILINOGEN, POC: NORMAL

## 2024-07-02 PROCEDURE — G8427 DOCREV CUR MEDS BY ELIG CLIN: HCPCS | Performed by: INTERNAL MEDICINE

## 2024-07-02 PROCEDURE — 99213 OFFICE O/P EST LOW 20 MIN: CPT

## 2024-07-02 PROCEDURE — 1036F TOBACCO NON-USER: CPT

## 2024-07-02 PROCEDURE — 1090F PRES/ABSN URINE INCON ASSESS: CPT | Performed by: INTERNAL MEDICINE

## 2024-07-02 PROCEDURE — 1123F ACP DISCUSS/DSCN MKR DOCD: CPT | Performed by: INTERNAL MEDICINE

## 2024-07-02 PROCEDURE — 1123F ACP DISCUSS/DSCN MKR DOCD: CPT

## 2024-07-02 PROCEDURE — 81002 URINALYSIS NONAUTO W/O SCOPE: CPT

## 2024-07-02 PROCEDURE — G8420 CALC BMI NORM PARAMETERS: HCPCS

## 2024-07-02 PROCEDURE — 1090F PRES/ABSN URINE INCON ASSESS: CPT

## 2024-07-02 PROCEDURE — G8427 DOCREV CUR MEDS BY ELIG CLIN: HCPCS

## 2024-07-02 PROCEDURE — G8399 PT W/DXA RESULTS DOCUMENT: HCPCS | Performed by: INTERNAL MEDICINE

## 2024-07-02 PROCEDURE — 1036F TOBACCO NON-USER: CPT | Performed by: INTERNAL MEDICINE

## 2024-07-02 PROCEDURE — G8420 CALC BMI NORM PARAMETERS: HCPCS | Performed by: INTERNAL MEDICINE

## 2024-07-02 PROCEDURE — 99213 OFFICE O/P EST LOW 20 MIN: CPT | Performed by: INTERNAL MEDICINE

## 2024-07-02 PROCEDURE — G8399 PT W/DXA RESULTS DOCUMENT: HCPCS

## 2024-07-02 RX ORDER — TRAZODONE HYDROCHLORIDE 50 MG/1
50-100 TABLET ORAL NIGHTLY
Qty: 60 TABLET | Refills: 1 | Status: SHIPPED | OUTPATIENT
Start: 2024-07-02

## 2024-07-02 RX ORDER — OXYCODONE AND ACETAMINOPHEN 7.5; 325 MG/1; MG/1
1 TABLET ORAL EVERY 6 HOURS PRN
Qty: 100 TABLET | Refills: 0 | Status: SHIPPED | OUTPATIENT
Start: 2024-07-02 | End: 2024-07-30

## 2024-07-02 RX ORDER — GABAPENTIN 600 MG/1
600 TABLET ORAL 2 TIMES DAILY
Qty: 60 TABLET | Refills: 1 | Status: SHIPPED | OUTPATIENT
Start: 2024-07-02 | End: 2024-08-31

## 2024-07-02 RX ORDER — CEFUROXIME AXETIL 250 MG/1
250 TABLET ORAL 2 TIMES DAILY
Qty: 20 TABLET | Refills: 0 | Status: SHIPPED | OUTPATIENT
Start: 2024-07-02 | End: 2024-07-12

## 2024-07-02 NOTE — PROGRESS NOTES
Nausea or Vomiting 21 tablet 0    albuterol sulfate HFA (PROAIR HFA) 108 (90 Base) MCG/ACT inhaler Inhale 2 puffs into the lungs every 6 hours as needed for Wheezing or Shortness of Breath 18 g 11    fluticasone furoate-vilanterol (BREO ELLIPTA) 200-25 MCG/ACT AEPB inhaler Inhale 1 puff into the lungs daily 1 each 11    naloxone (NARCAN) 4 MG/0.1ML LIQD nasal spray 1 spray by Nasal route as needed for Opioid Reversal 1 each 0    ipratropium-albuterol (DUONEB) 0.5-2.5 (3) MG/3ML SOLN nebulizer solution Take 1 aerosol every 4-6 hours as needed for shortness of breath coughing and wheezing 360 mL 0    aspirin 81 MG EC tablet Take 1 tablet by mouth in the morning. 30 tablet 3    albuterol sulfate  (90 Base) MCG/ACT inhaler Inhale 2 puffs into the lungs every 6 hours as needed for Shortness of Breath 1 each 11    Calcium Citrate-Vitamin D 500-500 MG-UNIT CHEW Take 1 tablet by mouth 2 times daily      ONE TOUCH LANCETS MISC 1 each by Does not apply route daily 100 each 3    Cholecalciferol (VITAMIN D) 2000 units TABS tablet Take 1 tablet by mouth daily 30 tablet     cetirizine (ZYRTEC) 10 MG tablet Take 1 tablet by mouth daily as needed for Allergies      Nebulizers (COMPRESSOR/NEBULIZER) MISC 1 Units by Does not apply route 4 times daily 1 each 3    denosumab (PROLIA) 60 MG/ML SOSY SC injection Inject 1 mL into the skin once for 1 dose Indications: per pt takes every 6 months. next due April 2024 1 each 0     No current facility-administered medications for this visit.       General Goals of current treatment regimen include improvement in pain, restoration of functioning- with focus on improvement in physical performance, general activity, work or disability,emotional distress, health care utilization and  decreased medication consumption.   Will continue to monitor progress towards achieving/maintaining therapeutic goals with special emphasis on  1. Improvement in perceived interfernce  of pain with ADL's.

## 2024-07-02 NOTE — PROGRESS NOTES
Lalita Pimentel (: 1945) is a 78 y.o. female is here for evaluation of the following chief complaint(s): Urinary Frequency (Pt has been using the restroom more frequently and has been experiencing some burning while urinating. )    Assessment/Plan:   1. Urinary frequency  -     POCT Urinalysis no Micro  -     Culture, Urine  2. Dysuria  -     Culture, Urine  3. Acute cystitis with hematuria  -     cefUROXime (CEFTIN) 250 MG tablet; Take 1 tablet by mouth 2 times daily for 10 days, Disp-20 tablet, R-0Normal  -     Culture, Urine  UA positive small leukocytes and trace intact blood  Will send for culture and treat empirically with ceftin 250 mg BID x10 days  Advised to hydrate well with water    The above diagnosis is a new problem.  We discussed expected course, resolution, and complications of diagnosis in detail.  I advised her to call back or return to office if symptoms worsen/change/persist.        Return if symptoms worsen or fail to improve.    Subjective/Objective:   She complains of dysuria, urgency, burning with urination, hesitancy for 2 days.  She denies nausea, vomiting, diarrhea, constipation, hematuria.  Since starting she reports her symptoms are not changed. Treatments tried: none.    Pertinent items are noted in HPI.      Physical Exam:  General: alert, cooperative, and no distress  Abdomen: tenderness noted suprapubic (pressure)  Back: CVA tenderness absent  : exam deferred  BP (!) 147/83   Pulse 83   Ht 1.626 m (5' 4\")   Wt 65 kg (143 lb 6.4 oz)   SpO2 93%   BMI 24.61 kg/m²        An electronic signature was used to authenticate this note.  -- Rosey Martin, APRN - CNP

## 2024-07-04 LAB — BACTERIA UR CULT: NORMAL

## 2024-07-12 ENCOUNTER — CARE COORDINATION (OUTPATIENT)
Dept: CARE COORDINATION | Age: 79
End: 2024-07-12

## 2024-07-12 NOTE — CARE COORDINATION
Ambulatory Care Coordination Note     7/12/2024 12:34 PM     Patient Current Location:  Home: [address] [place] [id number]     Patient graduated from the High Risk Care Management program on 7/12/2024.  Patient verbalizes confidence in the ability to self-manage at this time. has the ability to self manage at this time. progressing towards self management. .  Care management goals have been completed. No further Ambulatory Care Manager follow up scheduled.      ACM: Janet Villa RN     Challenges to be reviewed by the provider   Additional needs identified to be addressed with provider No  none               Method of communication with provider: none.      PCP/Specialist follow up:   Future Appointments         Provider Specialty Dept Phone    7/22/2024 11:00 AM Antelmo Garza MD Pulmonology [phone]    7/24/2024 1:00 PM Antelmo Garza MD Pulmonology [phone]    7/30/2024 12:30 PM Babatunde Leach MD Pain Management [phone]    8/9/2024 11:00 AM Belkis Silva, APRN - CNP Family Medicine [phone]            Follow Up:   No further Ambulatory Care Management follow-up scheduled at this time.  Patient  has Ambulatory Care Manager's contact information for any further questions, concerns or needs.

## 2024-07-13 DIAGNOSIS — E78.00 HYPERCHOLESTEREMIA: ICD-10-CM

## 2024-07-15 RX ORDER — ATORVASTATIN CALCIUM 80 MG/1
TABLET, FILM COATED ORAL
Qty: 90 TABLET | Refills: 1 | Status: SHIPPED | OUTPATIENT
Start: 2024-07-15

## 2024-07-22 ENCOUNTER — OFFICE VISIT (OUTPATIENT)
Dept: PULMONOLOGY | Age: 79
End: 2024-07-22
Payer: MEDICARE

## 2024-07-22 VITALS
OXYGEN SATURATION: 91 % | HEART RATE: 81 BPM | SYSTOLIC BLOOD PRESSURE: 118 MMHG | TEMPERATURE: 97 F | RESPIRATION RATE: 18 BRPM | BODY MASS INDEX: 24.34 KG/M2 | HEIGHT: 64 IN | DIASTOLIC BLOOD PRESSURE: 70 MMHG | WEIGHT: 142.6 LBS

## 2024-07-22 DIAGNOSIS — J30.89 OTHER ALLERGIC RHINITIS: ICD-10-CM

## 2024-07-22 DIAGNOSIS — R93.89 ABNORMAL CT OF THE CHEST: Primary | ICD-10-CM

## 2024-07-22 DIAGNOSIS — J96.11 CHRONIC RESPIRATORY FAILURE WITH HYPOXIA (HCC): ICD-10-CM

## 2024-07-22 PROBLEM — Z45.2 PERIPHERALLY INSERTED CENTRAL CATHETER (PICC) IN PLACE: Status: RESOLVED | Noted: 2022-07-31 | Resolved: 2024-07-22

## 2024-07-22 PROBLEM — J96.12 CHRONIC RESPIRATORY FAILURE WITH HYPERCAPNIA (HCC): Status: ACTIVE | Noted: 2017-01-10

## 2024-07-22 PROCEDURE — G8427 DOCREV CUR MEDS BY ELIG CLIN: HCPCS | Performed by: INTERNAL MEDICINE

## 2024-07-22 PROCEDURE — G8420 CALC BMI NORM PARAMETERS: HCPCS | Performed by: INTERNAL MEDICINE

## 2024-07-22 PROCEDURE — G8399 PT W/DXA RESULTS DOCUMENT: HCPCS | Performed by: INTERNAL MEDICINE

## 2024-07-22 PROCEDURE — 1090F PRES/ABSN URINE INCON ASSESS: CPT | Performed by: INTERNAL MEDICINE

## 2024-07-22 PROCEDURE — 1036F TOBACCO NON-USER: CPT | Performed by: INTERNAL MEDICINE

## 2024-07-22 PROCEDURE — 99214 OFFICE O/P EST MOD 30 MIN: CPT | Performed by: INTERNAL MEDICINE

## 2024-07-22 PROCEDURE — 1123F ACP DISCUSS/DSCN MKR DOCD: CPT | Performed by: INTERNAL MEDICINE

## 2024-07-22 NOTE — ASSESSMENT & PLAN NOTE
Discussed treatment.  Currently using Zyrtec.  Discussed adding Flonase over-the-counter in addition to Zyrtec.  She expressed understanding.

## 2024-07-22 NOTE — PROGRESS NOTES
REASON FOR CONSULTATION/CC: COPD          PCP: Belkis Silva, APRN - CNP    HISTORY OF PRESENT ILLNESS: Lalita Pimentel is a 78 y.o. year old female with a history of COPD who presents :           COPD Assessment Test (CAT)  Cough Assessment: 1  Phlegm Assessment: 2  Chest tightness: 2  Walking on an incline: 4  Home Activities: 2  Confident Leaving The Home: 2  Sleeping Soundly: 0  Have Energy: 2  Assessment Score: 15               Having some issue with change of weather.      Using nebulizer.   Breo   albuterol       Oxygen.  [place] ~ 90%.  [place] ~ 96%.     allergic rhinitis  Using Zyrtec.   Having sinus congestion with weather.                     Objective:   PHYSICAL EXAM:  Blood pressure 118/70, pulse 81, temperature 97 °F (36.1 °C), temperature source Infrared, resp. rate 18, height 1.626 m (5' 4.02\"), weight 64.7 kg (142 lb 9.6 oz), SpO2 91 %, not currently breastfeeding.'    Gen: No distress.    Eyes:    ENT:     Neck:    Resp: No accessory muscle use. No crackles. no wheezes, not musical. No rhonchi.    CV: Regular rate. Regular rhythm. No murmur or rub. No edema.   GI:    Skin:    Lymph:    M/S: No cyanosis. No clubbing.     Neuro: Moves all four extremities.   Psych: Oriented x 3. No anxiety.  Awake. Alert. Intact judgement and insight.          Data Reviewed:        Assessment:     COPD, Moderately severe FEV1 59%  Tobacco abuse.   - quit 2022.   Pulmonary nodules, new pulmonary nodule 2022  Right upper lobe Pseudomonas pneumonia with abscess with substantial fibrosis and right upper lobe collapse leading to restriction    Plan:        Problem List Items Addressed This Visit       Chronic respiratory failure with hypoxia (HCC)     Saturation is 90-96% on room air.  We discussed with potential of going on oxygen.  At this point, patient does not need oxygen.  She will continue to monitor at home.         Abnormal CT of the chest - Primary     No change in right upper lobe cavity based on last chest

## 2024-07-22 NOTE — ASSESSMENT & PLAN NOTE
Saturation is 90-96% on room air.  We discussed with potential of going on oxygen.  At this point, patient does not need oxygen.  She will continue to monitor at home.

## 2024-07-26 DIAGNOSIS — F33.1 MODERATE EPISODE OF RECURRENT MAJOR DEPRESSIVE DISORDER (HCC): ICD-10-CM

## 2024-07-29 RX ORDER — DULOXETIN HYDROCHLORIDE 30 MG/1
CAPSULE, DELAYED RELEASE ORAL
Qty: 90 CAPSULE | Refills: 0 | Status: SHIPPED | OUTPATIENT
Start: 2024-07-29

## 2024-07-29 NOTE — TELEPHONE ENCOUNTER
In last note it was listed as duloxetine 60 mg bid but previous notes it was 60 mg daily plus 30 mg daily.  Which dose is correct?  Thanks

## 2024-07-30 ENCOUNTER — OFFICE VISIT (OUTPATIENT)
Dept: PAIN MANAGEMENT | Age: 79
End: 2024-07-30
Payer: MEDICARE

## 2024-07-30 VITALS
SYSTOLIC BLOOD PRESSURE: 126 MMHG | DIASTOLIC BLOOD PRESSURE: 74 MMHG | OXYGEN SATURATION: 96 % | HEART RATE: 91 BPM | BODY MASS INDEX: 24.36 KG/M2 | WEIGHT: 142 LBS

## 2024-07-30 DIAGNOSIS — M51.36 DDD (DEGENERATIVE DISC DISEASE), LUMBAR: ICD-10-CM

## 2024-07-30 DIAGNOSIS — M47.26 OSTEOARTHRITIS OF SPINE WITH RADICULOPATHY, LUMBAR REGION: ICD-10-CM

## 2024-07-30 DIAGNOSIS — M79.7 FIBROMYALGIA: ICD-10-CM

## 2024-07-30 DIAGNOSIS — M47.816 LUMBAR SPONDYLOSIS: ICD-10-CM

## 2024-07-30 DIAGNOSIS — G89.4 CHRONIC PAIN SYNDROME: ICD-10-CM

## 2024-07-30 DIAGNOSIS — M51.37 DEGENERATION OF LUMBAR OR LUMBOSACRAL INTERVERTEBRAL DISC: ICD-10-CM

## 2024-07-30 DIAGNOSIS — M70.61 TROCHANTERIC BURSITIS OF RIGHT HIP: ICD-10-CM

## 2024-07-30 PROCEDURE — 1123F ACP DISCUSS/DSCN MKR DOCD: CPT | Performed by: INTERNAL MEDICINE

## 2024-07-30 PROCEDURE — G8399 PT W/DXA RESULTS DOCUMENT: HCPCS | Performed by: INTERNAL MEDICINE

## 2024-07-30 PROCEDURE — 1090F PRES/ABSN URINE INCON ASSESS: CPT | Performed by: INTERNAL MEDICINE

## 2024-07-30 PROCEDURE — 1036F TOBACCO NON-USER: CPT | Performed by: INTERNAL MEDICINE

## 2024-07-30 PROCEDURE — G8420 CALC BMI NORM PARAMETERS: HCPCS | Performed by: INTERNAL MEDICINE

## 2024-07-30 PROCEDURE — 99213 OFFICE O/P EST LOW 20 MIN: CPT | Performed by: INTERNAL MEDICINE

## 2024-07-30 PROCEDURE — G8427 DOCREV CUR MEDS BY ELIG CLIN: HCPCS | Performed by: INTERNAL MEDICINE

## 2024-07-30 RX ORDER — GABAPENTIN 600 MG/1
600 TABLET ORAL 2 TIMES DAILY
Qty: 60 TABLET | Refills: 1 | Status: SHIPPED | OUTPATIENT
Start: 2024-07-30 | End: 2024-09-28

## 2024-07-30 RX ORDER — TRAZODONE HYDROCHLORIDE 50 MG/1
50-100 TABLET ORAL NIGHTLY
Qty: 60 TABLET | Refills: 1 | Status: SHIPPED | OUTPATIENT
Start: 2024-07-30

## 2024-07-30 RX ORDER — PANTOPRAZOLE SODIUM 40 MG/1
40 TABLET, DELAYED RELEASE ORAL DAILY PRN
Qty: 30 TABLET | Refills: 1 | Status: SHIPPED | OUTPATIENT
Start: 2024-07-30

## 2024-07-30 RX ORDER — OXYCODONE AND ACETAMINOPHEN 7.5; 325 MG/1; MG/1
1 TABLET ORAL EVERY 6 HOURS PRN
Qty: 120 TABLET | Refills: 0 | Status: SHIPPED | OUTPATIENT
Start: 2024-07-30 | End: 2024-09-03

## 2024-07-30 NOTE — PROGRESS NOTES
overdose and death, if medicines not taken as prescribed, were also discussed. If the patient develops new symptoms or if the symptoms worsen, the patient should call the office.    Thank you for allowing me to participate in the care of this patient.      Cc: Belkis Ellsworth, APRN - CNP

## 2024-08-09 ENCOUNTER — OFFICE VISIT (OUTPATIENT)
Dept: FAMILY MEDICINE CLINIC | Age: 79
End: 2024-08-09

## 2024-08-09 VITALS
TEMPERATURE: 98.3 F | SYSTOLIC BLOOD PRESSURE: 124 MMHG | WEIGHT: 142.4 LBS | HEART RATE: 69 BPM | HEIGHT: 64 IN | BODY MASS INDEX: 24.31 KG/M2 | RESPIRATION RATE: 20 BRPM | OXYGEN SATURATION: 92 % | DIASTOLIC BLOOD PRESSURE: 72 MMHG

## 2024-08-09 DIAGNOSIS — N18.31 TYPE 2 DIABETES MELLITUS WITH STAGE 3A CHRONIC KIDNEY DISEASE, WITHOUT LONG-TERM CURRENT USE OF INSULIN (HCC): ICD-10-CM

## 2024-08-09 DIAGNOSIS — E78.00 HYPERCHOLESTEREMIA: ICD-10-CM

## 2024-08-09 DIAGNOSIS — E11.22 TYPE 2 DIABETES MELLITUS WITH STAGE 3A CHRONIC KIDNEY DISEASE, WITHOUT LONG-TERM CURRENT USE OF INSULIN (HCC): ICD-10-CM

## 2024-08-09 DIAGNOSIS — E11.22 TYPE 2 DIABETES MELLITUS WITH STAGE 3A CHRONIC KIDNEY DISEASE, WITHOUT LONG-TERM CURRENT USE OF INSULIN (HCC): Primary | ICD-10-CM

## 2024-08-09 DIAGNOSIS — N18.31 TYPE 2 DIABETES MELLITUS WITH STAGE 3A CHRONIC KIDNEY DISEASE, WITHOUT LONG-TERM CURRENT USE OF INSULIN (HCC): Primary | ICD-10-CM

## 2024-08-09 DIAGNOSIS — M81.0 AGE-RELATED OSTEOPOROSIS WITHOUT CURRENT PATHOLOGICAL FRACTURE: ICD-10-CM

## 2024-08-09 DIAGNOSIS — F33.1 MODERATE EPISODE OF RECURRENT MAJOR DEPRESSIVE DISORDER (HCC): ICD-10-CM

## 2024-08-09 DIAGNOSIS — J44.9 COPD, SEVERE (HCC): ICD-10-CM

## 2024-08-09 LAB
ALBUMIN SERPL-MCNC: 4 G/DL (ref 3.4–5)
ALBUMIN/GLOB SERPL: 1.6 {RATIO} (ref 1.1–2.2)
ALP SERPL-CCNC: 85 U/L (ref 40–129)
ALT SERPL-CCNC: 7 U/L (ref 10–40)
ANION GAP SERPL CALCULATED.3IONS-SCNC: 12 MMOL/L (ref 3–16)
AST SERPL-CCNC: 12 U/L (ref 15–37)
BILIRUB SERPL-MCNC: 0.5 MG/DL (ref 0–1)
BUN SERPL-MCNC: 17 MG/DL (ref 7–20)
CALCIUM SERPL-MCNC: 9.2 MG/DL (ref 8.3–10.6)
CHLORIDE SERPL-SCNC: 101 MMOL/L (ref 99–110)
CO2 SERPL-SCNC: 28 MMOL/L (ref 21–32)
CREAT SERPL-MCNC: 1 MG/DL (ref 0.6–1.2)
GFR SERPLBLD CREATININE-BSD FMLA CKD-EPI: 58 ML/MIN/{1.73_M2}
GLUCOSE SERPL-MCNC: 101 MG/DL (ref 70–99)
HBA1C MFR BLD: 6 %
POTASSIUM SERPL-SCNC: 4 MMOL/L (ref 3.5–5.1)
PROT SERPL-MCNC: 6.5 G/DL (ref 6.4–8.2)
SODIUM SERPL-SCNC: 141 MMOL/L (ref 136–145)

## 2024-08-09 ASSESSMENT — ENCOUNTER SYMPTOMS
WHEEZING: 1
ABDOMINAL PAIN: 0
COUGH: 1
SHORTNESS OF BREATH: 1
SINUS PAIN: 0
CHEST TIGHTNESS: 0
EYE ITCHING: 0

## 2024-08-09 NOTE — PROGRESS NOTES
8/9/2024    This is a 78 y.o. female   Chief Complaint   Patient presents with    Diabetes     Has not been checking sugar.     Fall     Has had a couple recent falls.  Has some bruising on rt hand and wrist.   .    HPI  This is a 79 y/o female presenting for follow-up of hyperlipidemia, DM, GERD, COPD, osteoporosis.     Hyperlipidemia: Tolerating Lipitor well. No new myalgias or GI upset on atorvastatin (Lipitor). Tries to maintain low fat diet supplemented with mild exercise.    GERD: Stable on daily PPI     COPD: On breo inhaler daily.   She is using her albuterol inhaler 1-2 times per day.   Continues with wheezing and shortness of breath. Feels that breathing is stable.   Quit smoking 7/2022  Follows with pulmonologist Dr. Garza.    Osteoporosis: Last DEXA was 12/2023. Taking calcium and vitamin D supplement daily. On prolia injection every 6 months      Insomnia: Denies issues. On trazodone.     Diabetes Mellitus Type 2: Current symptoms/problems include none.    Home blood sugar records: patient does not test  Any episodes of hypoglycemia? no  Eye exam current (within one year): no   reports that she quit smoking about 2 years ago. Her smoking use included cigarettes. She started smoking about 62 years ago. She has a 60.5 pack-year smoking history. She has been exposed to tobacco smoke. She has never used smokeless tobacco.       Lab Results   Component Value Date    LABA1C 6.2 04/19/2024    LABA1C 6.0 10/27/2023    LABA1C 5.9 01/17/2023     Lab Results   Component Value Date    CREATININE 1.0 04/03/2024     Lab Results   Component Value Date    ALT 11 03/27/2024    AST 13 (L) 03/27/2024     Lab Results   Component Value Date    CHOL 210 (H) 01/17/2023    TRIG 139 01/17/2023    HDL 46 10/27/2023    LDLDIRECT 176 (H) 10/12/2012        Depression- feels that mood is better with taking cymbalta 60 mg in AM and 30 mg in PM.     Chronic pain syndrome- follows with pain specialist Dr. Leach.     Patient Active

## 2024-09-03 ENCOUNTER — OFFICE VISIT (OUTPATIENT)
Dept: PAIN MANAGEMENT | Age: 79
End: 2024-09-03
Payer: MEDICARE

## 2024-09-03 VITALS
BODY MASS INDEX: 24.02 KG/M2 | WEIGHT: 140 LBS | DIASTOLIC BLOOD PRESSURE: 72 MMHG | HEART RATE: 81 BPM | OXYGEN SATURATION: 92 % | SYSTOLIC BLOOD PRESSURE: 125 MMHG

## 2024-09-03 DIAGNOSIS — Z51.81 ENCOUNTER FOR THERAPEUTIC DRUG MONITORING: ICD-10-CM

## 2024-09-03 DIAGNOSIS — M51.37 DEGENERATION OF LUMBAR OR LUMBOSACRAL INTERVERTEBRAL DISC: ICD-10-CM

## 2024-09-03 DIAGNOSIS — M47.816 LUMBAR SPONDYLOSIS: ICD-10-CM

## 2024-09-03 DIAGNOSIS — M70.61 TROCHANTERIC BURSITIS OF RIGHT HIP: ICD-10-CM

## 2024-09-03 DIAGNOSIS — M79.7 FIBROMYALGIA: ICD-10-CM

## 2024-09-03 DIAGNOSIS — M51.36 DDD (DEGENERATIVE DISC DISEASE), LUMBAR: ICD-10-CM

## 2024-09-03 DIAGNOSIS — M47.26 OSTEOARTHRITIS OF SPINE WITH RADICULOPATHY, LUMBAR REGION: ICD-10-CM

## 2024-09-03 DIAGNOSIS — G89.4 CHRONIC PAIN SYNDROME: ICD-10-CM

## 2024-09-03 PROCEDURE — 99213 OFFICE O/P EST LOW 20 MIN: CPT | Performed by: INTERNAL MEDICINE

## 2024-09-03 PROCEDURE — G8399 PT W/DXA RESULTS DOCUMENT: HCPCS | Performed by: INTERNAL MEDICINE

## 2024-09-03 PROCEDURE — 1036F TOBACCO NON-USER: CPT | Performed by: INTERNAL MEDICINE

## 2024-09-03 PROCEDURE — 1090F PRES/ABSN URINE INCON ASSESS: CPT | Performed by: INTERNAL MEDICINE

## 2024-09-03 PROCEDURE — G8427 DOCREV CUR MEDS BY ELIG CLIN: HCPCS | Performed by: INTERNAL MEDICINE

## 2024-09-03 PROCEDURE — G8420 CALC BMI NORM PARAMETERS: HCPCS | Performed by: INTERNAL MEDICINE

## 2024-09-03 PROCEDURE — 1123F ACP DISCUSS/DSCN MKR DOCD: CPT | Performed by: INTERNAL MEDICINE

## 2024-09-03 RX ORDER — GABAPENTIN 600 MG/1
600 TABLET ORAL 2 TIMES DAILY
Qty: 60 TABLET | Refills: 1 | Status: SHIPPED | OUTPATIENT
Start: 2024-09-03 | End: 2024-11-02

## 2024-09-03 RX ORDER — TRAZODONE HYDROCHLORIDE 50 MG/1
50-100 TABLET, FILM COATED ORAL NIGHTLY
Qty: 60 TABLET | Refills: 1 | Status: SHIPPED | OUTPATIENT
Start: 2024-09-03

## 2024-09-03 RX ORDER — OXYCODONE AND ACETAMINOPHEN 7.5; 325 MG/1; MG/1
1 TABLET ORAL EVERY 6 HOURS PRN
Qty: 120 TABLET | Refills: 0 | Status: SHIPPED | OUTPATIENT
Start: 2024-09-03 | End: 2024-10-08

## 2024-09-03 RX ORDER — PANTOPRAZOLE SODIUM 40 MG/1
40 TABLET, DELAYED RELEASE ORAL DAILY PRN
Qty: 30 TABLET | Refills: 1 | Status: SHIPPED | OUTPATIENT
Start: 2024-09-03

## 2024-09-04 DIAGNOSIS — G89.4 CHRONIC PAIN SYNDROME: ICD-10-CM

## 2024-09-04 DIAGNOSIS — F33.1 MODERATE EPISODE OF RECURRENT MAJOR DEPRESSIVE DISORDER (HCC): ICD-10-CM

## 2024-09-04 RX ORDER — DULOXETIN HYDROCHLORIDE 60 MG/1
CAPSULE, DELAYED RELEASE ORAL DAILY
Qty: 90 CAPSULE | Refills: 0 | Status: SHIPPED | OUTPATIENT
Start: 2024-09-04

## 2024-09-04 NOTE — PROGRESS NOTES
explained to the patient.    -ROM/Stretching exercises as advised   -Urine drug screen with GC/MS for opiates and drugs of abuse was ordered and will follow up on results.   -Continue with Percocet 3-4 per day  -She was advised to increase fluids ( 5-7  glasses of fluid daily), limit caffeine, avoid cheese products, increase dietary fiber, increase activity and exercise as tolerated and relax regularly and enjoy meals   -Continue with Neurontin and Trazodone   Current Outpatient Medications   Medication Sig Dispense Refill    gabapentin (NEURONTIN) 600 MG tablet Take 1 tablet by mouth 2 times daily for 60 days. 60 tablet 1    oxyCODONE-acetaminophen (PERCOCET) 7.5-325 MG per tablet Take 1 tablet by mouth every 6 hours as needed for Pain (max 3-4) for up to 35 days. 120 tablet 0    pantoprazole (PROTONIX) 40 MG tablet Take 1 tablet by mouth daily as needed (GERD symptoms) 30 tablet 1    traZODone (DESYREL) 50 MG tablet Take 1-2 tablets by mouth nightly 60 tablet 1    DULoxetine (CYMBALTA) 30 MG extended release capsule TAKE 1 CAPSULE BY MOUTH IN THE EVENING 90 capsule 0    atorvastatin (LIPITOR) 80 MG tablet TAKE 1 TABLET BY MOUTH EVERY DAY FOR CHOLESTEROL 90 tablet 1    DULoxetine (CYMBALTA) 60 MG extended release capsule TAKE 1 CAPSULE BY MOUTH EVERY DAY 90 capsule 0    albuterol sulfate HFA (PROAIR HFA) 108 (90 Base) MCG/ACT inhaler Inhale 2 puffs into the lungs every 6 hours as needed for Wheezing or Shortness of Breath 18 g 11    fluticasone furoate-vilanterol (BREO ELLIPTA) 200-25 MCG/ACT AEPB inhaler Inhale 1 puff into the lungs daily 1 each 11    naloxone (NARCAN) 4 MG/0.1ML LIQD nasal spray 1 spray by Nasal route as needed for Opioid Reversal 1 each 0    ipratropium-albuterol (DUONEB) 0.5-2.5 (3) MG/3ML SOLN nebulizer solution Take 1 aerosol every 4-6 hours as needed for shortness of breath coughing and wheezing 360 mL 0    aspirin 81 MG EC tablet Take 1 tablet by mouth in the morning. 30 tablet 3

## 2024-09-20 DIAGNOSIS — M79.7 FIBROMYALGIA: ICD-10-CM

## 2024-09-20 DIAGNOSIS — G89.4 CHRONIC PAIN SYNDROME: ICD-10-CM

## 2024-09-20 DIAGNOSIS — M70.61 TROCHANTERIC BURSITIS OF RIGHT HIP: ICD-10-CM

## 2024-09-20 DIAGNOSIS — M47.26 OSTEOARTHRITIS OF SPINE WITH RADICULOPATHY, LUMBAR REGION: ICD-10-CM

## 2024-09-30 ENCOUNTER — TELEPHONE (OUTPATIENT)
Dept: PULMONOLOGY | Age: 79
End: 2024-09-30

## 2024-09-30 DIAGNOSIS — J44.9 COPD, SEVERE (HCC): ICD-10-CM

## 2024-09-30 RX ORDER — FLUTICASONE FUROATE AND VILANTEROL 200; 25 UG/1; UG/1
1 POWDER RESPIRATORY (INHALATION) DAILY
Qty: 1 EACH | Refills: 11 | Status: SHIPPED | OUTPATIENT
Start: 2024-09-30

## 2024-09-30 NOTE — TELEPHONE ENCOUNTER
Lea from [employer] calls in stating that Lalita is now covered for Breo at $10 per month and asks that we send the prescription in to [facility]  She states she should be taking an inhaler daily for her moderate to severe COPD and sees that the October script was cancelled.

## 2024-10-03 RX ORDER — MELOXICAM 15 MG/1
TABLET ORAL
Qty: 30 TABLET | Refills: 0 | OUTPATIENT
Start: 2024-10-03

## 2024-10-04 DIAGNOSIS — M79.7 FIBROMYALGIA: ICD-10-CM

## 2024-10-04 DIAGNOSIS — G89.4 CHRONIC PAIN SYNDROME: ICD-10-CM

## 2024-10-04 DIAGNOSIS — M47.26 OSTEOARTHRITIS OF SPINE WITH RADICULOPATHY, LUMBAR REGION: ICD-10-CM

## 2024-10-04 DIAGNOSIS — M70.61 TROCHANTERIC BURSITIS OF RIGHT HIP: ICD-10-CM

## 2024-10-08 ENCOUNTER — OFFICE VISIT (OUTPATIENT)
Dept: PAIN MANAGEMENT | Age: 79
End: 2024-10-08
Payer: MEDICARE

## 2024-10-08 VITALS
SYSTOLIC BLOOD PRESSURE: 125 MMHG | BODY MASS INDEX: 24.67 KG/M2 | HEART RATE: 74 BPM | OXYGEN SATURATION: 96 % | WEIGHT: 143.8 LBS | DIASTOLIC BLOOD PRESSURE: 79 MMHG

## 2024-10-08 DIAGNOSIS — G89.4 CHRONIC PAIN SYNDROME: ICD-10-CM

## 2024-10-08 DIAGNOSIS — M51.362 DEGENERATION OF INTERVERTEBRAL DISC OF LUMBAR REGION WITH DISCOGENIC BACK PAIN AND LOWER EXTREMITY PAIN: ICD-10-CM

## 2024-10-08 DIAGNOSIS — K21.9 GASTROESOPHAGEAL REFLUX DISEASE WITHOUT ESOPHAGITIS: ICD-10-CM

## 2024-10-08 DIAGNOSIS — M47.816 LUMBAR SPONDYLOSIS: ICD-10-CM

## 2024-10-08 DIAGNOSIS — F51.01 PRIMARY INSOMNIA: ICD-10-CM

## 2024-10-08 DIAGNOSIS — M70.61 TROCHANTERIC BURSITIS OF RIGHT HIP: ICD-10-CM

## 2024-10-08 DIAGNOSIS — M79.7 FIBROMYALGIA: ICD-10-CM

## 2024-10-08 DIAGNOSIS — M47.26 OSTEOARTHRITIS OF SPINE WITH RADICULOPATHY, LUMBAR REGION: ICD-10-CM

## 2024-10-08 DIAGNOSIS — F33.1 MODERATE EPISODE OF RECURRENT MAJOR DEPRESSIVE DISORDER (HCC): ICD-10-CM

## 2024-10-08 DIAGNOSIS — Z91.89 AT RISK FOR RESPIRATORY DEPRESSION DUE TO OPIOID: ICD-10-CM

## 2024-10-08 PROCEDURE — G8420 CALC BMI NORM PARAMETERS: HCPCS | Performed by: INTERNAL MEDICINE

## 2024-10-08 PROCEDURE — 1036F TOBACCO NON-USER: CPT | Performed by: INTERNAL MEDICINE

## 2024-10-08 PROCEDURE — 99214 OFFICE O/P EST MOD 30 MIN: CPT | Performed by: INTERNAL MEDICINE

## 2024-10-08 PROCEDURE — G8484 FLU IMMUNIZE NO ADMIN: HCPCS | Performed by: INTERNAL MEDICINE

## 2024-10-08 PROCEDURE — 1090F PRES/ABSN URINE INCON ASSESS: CPT | Performed by: INTERNAL MEDICINE

## 2024-10-08 PROCEDURE — G8399 PT W/DXA RESULTS DOCUMENT: HCPCS | Performed by: INTERNAL MEDICINE

## 2024-10-08 PROCEDURE — 1123F ACP DISCUSS/DSCN MKR DOCD: CPT | Performed by: INTERNAL MEDICINE

## 2024-10-08 PROCEDURE — G8427 DOCREV CUR MEDS BY ELIG CLIN: HCPCS | Performed by: INTERNAL MEDICINE

## 2024-10-08 RX ORDER — TRAZODONE HYDROCHLORIDE 50 MG/1
50-100 TABLET, FILM COATED ORAL NIGHTLY
Qty: 60 TABLET | Refills: 1 | Status: SHIPPED | OUTPATIENT
Start: 2024-10-08

## 2024-10-08 RX ORDER — PANTOPRAZOLE SODIUM 40 MG/1
40 TABLET, DELAYED RELEASE ORAL DAILY PRN
Qty: 30 TABLET | Refills: 1 | Status: SHIPPED | OUTPATIENT
Start: 2024-10-08

## 2024-10-08 RX ORDER — GABAPENTIN 600 MG/1
600 TABLET ORAL 2 TIMES DAILY
Qty: 60 TABLET | Refills: 1 | Status: SHIPPED | OUTPATIENT
Start: 2024-10-08 | End: 2024-12-07

## 2024-10-08 RX ORDER — MELOXICAM 15 MG/1
TABLET ORAL
Qty: 30 TABLET | Refills: 0 | OUTPATIENT
Start: 2024-10-08

## 2024-10-08 RX ORDER — OXYCODONE AND ACETAMINOPHEN 7.5; 325 MG/1; MG/1
1 TABLET ORAL EVERY 6 HOURS PRN
Qty: 120 TABLET | Refills: 0 | Status: SHIPPED | OUTPATIENT
Start: 2024-10-08 | End: 2024-11-12

## 2024-10-08 RX ORDER — METHYLPREDNISOLONE 4 MG
TABLET, DOSE PACK ORAL
Qty: 1 KIT | Refills: 0 | Status: SHIPPED | OUTPATIENT
Start: 2024-10-08

## 2024-10-08 NOTE — PROGRESS NOTES
misused and not taken as prescribed. If she develops new symptoms or if the symptoms worsen, she was told to call the office. .  Thank you for allowing me to participate in the care of this patient.    Babatunde Leach MD    Cc: Belkis Ellsworth, APRN - CNP  I, Awilda Joy, am scribing for and in the presence of Dr. Babatunde Leach.   10/08/24  Awilda Joy MA    I, Dr. Babatunde Leach, personally performed the services described in this documentation as scribed by   Awilda Joy MA  and it is both accurate and complete

## 2024-10-20 DIAGNOSIS — F33.1 MODERATE EPISODE OF RECURRENT MAJOR DEPRESSIVE DISORDER (HCC): ICD-10-CM

## 2024-10-21 RX ORDER — DULOXETIN HYDROCHLORIDE 30 MG/1
CAPSULE, DELAYED RELEASE ORAL
Qty: 90 CAPSULE | Refills: 1 | Status: SHIPPED | OUTPATIENT
Start: 2024-10-21

## 2024-10-30 DIAGNOSIS — M81.0 SENILE OSTEOPOROSIS: Primary | ICD-10-CM

## 2024-10-30 RX ORDER — ALBUTEROL SULFATE 90 UG/1
4 INHALANT RESPIRATORY (INHALATION) PRN
OUTPATIENT
Start: 2024-11-13

## 2024-10-30 RX ORDER — SODIUM CHLORIDE 9 MG/ML
INJECTION, SOLUTION INTRAVENOUS CONTINUOUS
OUTPATIENT
Start: 2024-11-13

## 2024-10-30 RX ORDER — DIPHENHYDRAMINE HYDROCHLORIDE 50 MG/ML
50 INJECTION INTRAMUSCULAR; INTRAVENOUS
OUTPATIENT
Start: 2024-11-13

## 2024-10-30 RX ORDER — ACETAMINOPHEN 325 MG/1
650 TABLET ORAL
OUTPATIENT
Start: 2024-11-13

## 2024-10-30 RX ORDER — EPINEPHRINE 1 MG/ML
0.3 INJECTION, SOLUTION, CONCENTRATE INTRAVENOUS PRN
OUTPATIENT
Start: 2024-11-13

## 2024-10-30 RX ORDER — ONDANSETRON 2 MG/ML
8 INJECTION INTRAMUSCULAR; INTRAVENOUS
OUTPATIENT
Start: 2024-11-13

## 2024-10-31 ENCOUNTER — TELEPHONE (OUTPATIENT)
Dept: FAMILY MEDICINE CLINIC | Age: 79
End: 2024-10-31

## 2024-10-31 DIAGNOSIS — M81.0 SENILE OSTEOPOROSIS: ICD-10-CM

## 2024-10-31 DIAGNOSIS — M81.0 AGE-RELATED OSTEOPOROSIS WITHOUT CURRENT PATHOLOGICAL FRACTURE: ICD-10-CM

## 2024-10-31 NOTE — TELEPHONE ENCOUNTER
Received the following email from the infusion center:    From: Claribel Rodarte <[email or web address]>   Sent: Wednesday, October 30, 2024 9:21 AM  To: Soraya Aguiar <[email or web address]>  Subject: Lalita Whitakerday [birth year]    Hi,    This patient wants her next Prolia. We sent a reminder to Belkis on 10/17/24 requesting the order.  We haven't received it yet. I see a chart entry stating  patient is in Palliative or Hospice care.  I am wondering if the treatment is no longer appropriate and that is why we have not received order.    Thanks,  Claribel    Patient would be due in November.  Order has been filled out for signature and labs if still appropriate.  Thanks

## 2024-11-01 LAB
ANION GAP SERPL CALCULATED.3IONS-SCNC: 14 MMOL/L (ref 3–16)
BUN SERPL-MCNC: 13 MG/DL (ref 7–20)
CALCIUM SERPL-MCNC: 9.1 MG/DL (ref 8.3–10.6)
CHLORIDE SERPL-SCNC: 105 MMOL/L (ref 99–110)
CO2 SERPL-SCNC: 25 MMOL/L (ref 21–32)
CREAT SERPL-MCNC: 0.9 MG/DL (ref 0.6–1.2)
GFR SERPLBLD CREATININE-BSD FMLA CKD-EPI: 66 ML/MIN/{1.73_M2}
GLUCOSE SERPL-MCNC: 97 MG/DL (ref 70–99)
POTASSIUM SERPL-SCNC: 4.4 MMOL/L (ref 3.5–5.1)
SODIUM SERPL-SCNC: 144 MMOL/L (ref 136–145)

## 2024-11-12 ENCOUNTER — OFFICE VISIT (OUTPATIENT)
Dept: PAIN MANAGEMENT | Age: 79
End: 2024-11-12

## 2024-11-12 VITALS
HEART RATE: 99 BPM | WEIGHT: 139.2 LBS | DIASTOLIC BLOOD PRESSURE: 66 MMHG | OXYGEN SATURATION: 92 % | BODY MASS INDEX: 23.88 KG/M2 | SYSTOLIC BLOOD PRESSURE: 113 MMHG

## 2024-11-12 DIAGNOSIS — M70.61 TROCHANTERIC BURSITIS OF RIGHT HIP: ICD-10-CM

## 2024-11-12 DIAGNOSIS — M47.816 LUMBAR SPONDYLOSIS: ICD-10-CM

## 2024-11-12 DIAGNOSIS — M51.362 DEGENERATION OF INTERVERTEBRAL DISC OF LUMBAR REGION WITH DISCOGENIC BACK PAIN AND LOWER EXTREMITY PAIN: ICD-10-CM

## 2024-11-12 DIAGNOSIS — M79.7 FIBROMYALGIA: ICD-10-CM

## 2024-11-12 DIAGNOSIS — G89.4 CHRONIC PAIN SYNDROME: ICD-10-CM

## 2024-11-12 DIAGNOSIS — R25.2 MUSCLE CRAMPING: ICD-10-CM

## 2024-11-12 DIAGNOSIS — M47.26 OSTEOARTHRITIS OF SPINE WITH RADICULOPATHY, LUMBAR REGION: ICD-10-CM

## 2024-11-12 RX ORDER — TRAZODONE HYDROCHLORIDE 50 MG/1
50-100 TABLET, FILM COATED ORAL NIGHTLY
Qty: 60 TABLET | Refills: 1 | Status: SHIPPED | OUTPATIENT
Start: 2024-11-12

## 2024-11-12 RX ORDER — GABAPENTIN 600 MG/1
600 TABLET ORAL 2 TIMES DAILY
Qty: 60 TABLET | Refills: 1 | Status: SHIPPED | OUTPATIENT
Start: 2024-11-12 | End: 2025-01-11

## 2024-11-12 RX ORDER — OXYCODONE AND ACETAMINOPHEN 7.5; 325 MG/1; MG/1
1 TABLET ORAL EVERY 6 HOURS PRN
Qty: 120 TABLET | Refills: 0 | Status: SHIPPED | OUTPATIENT
Start: 2024-11-12 | End: 2024-12-17

## 2024-11-12 RX ORDER — PANTOPRAZOLE SODIUM 40 MG/1
40 TABLET, DELAYED RELEASE ORAL DAILY PRN
Qty: 30 TABLET | Refills: 1 | Status: SHIPPED | OUTPATIENT
Start: 2024-11-12

## 2024-11-12 NOTE — PROGRESS NOTES
allergies were reviewed     MEDICATIONS: Ms. Pimentel list of medications were reviewed.Her current medications are   Outpatient Medications Prior to Visit   Medication Sig Dispense Refill    DULoxetine (CYMBALTA) 30 MG extended release capsule TAKE 1 CAPSULE BY MOUTH IN THE EVENING 90 capsule 1    gabapentin (NEURONTIN) 600 MG tablet Take 1 tablet by mouth 2 times daily for 60 days. 60 tablet 1    naloxone (NARCAN) 4 MG/0.1ML LIQD nasal spray 1 spray by Nasal route as needed for Opioid Reversal 1 each 0    oxyCODONE-acetaminophen (PERCOCET) 7.5-325 MG per tablet Take 1 tablet by mouth every 6 hours as needed for Pain (max 3-4) for up to 35 days. 120 tablet 0    pantoprazole (PROTONIX) 40 MG tablet Take 1 tablet by mouth daily as needed (GERD symptoms) 30 tablet 1    traZODone (DESYREL) 50 MG tablet Take 1-2 tablets by mouth nightly 60 tablet 1    fluticasone furoate-vilanterol (BREO ELLIPTA) 200-25 MCG/ACT AEPB inhaler Inhale 1 puff into the lungs daily 1 each 11    DULoxetine (CYMBALTA) 60 MG extended release capsule TAKE 1 CAPSULE BY MOUTH EVERY DAY 90 capsule 0    atorvastatin (LIPITOR) 80 MG tablet TAKE 1 TABLET BY MOUTH EVERY DAY FOR CHOLESTEROL 90 tablet 1    albuterol sulfate HFA (PROAIR HFA) 108 (90 Base) MCG/ACT inhaler Inhale 2 puffs into the lungs every 6 hours as needed for Wheezing or Shortness of Breath 18 g 11    ipratropium-albuterol (DUONEB) 0.5-2.5 (3) MG/3ML SOLN nebulizer solution Take 1 aerosol every 4-6 hours as needed for shortness of breath coughing and wheezing 360 mL 0    aspirin 81 MG EC tablet Take 1 tablet by mouth in the morning. 30 tablet 3    albuterol sulfate  (90 Base) MCG/ACT inhaler Inhale 2 puffs into the lungs every 6 hours as needed for Shortness of Breath 1 each 11    Calcium Citrate-Vitamin D 500-500 MG-UNIT CHEW Take 1 tablet by mouth 2 times daily      ONE TOUCH LANCETS MISC 1 each by Does not apply route daily 100 each 3    Cholecalciferol (VITAMIN D) 2000 units TABS

## 2024-11-19 ENCOUNTER — HOSPITAL ENCOUNTER (OUTPATIENT)
Dept: GENERAL RADIOLOGY | Age: 79
Discharge: HOME | End: 2024-11-19
Attending: INTERNAL MEDICINE
Payer: MEDICARE

## 2024-11-19 ENCOUNTER — HOSPITAL ENCOUNTER (OUTPATIENT)
Dept: INFUSION THERAPY | Age: 79
Setting detail: INFUSION SERIES
Discharge: HOME | End: 2024-11-19
Payer: MEDICARE

## 2024-11-19 ENCOUNTER — HOSPITAL ENCOUNTER (OUTPATIENT)
Age: 79
Discharge: HOME | End: 2024-11-19
Payer: MEDICARE

## 2024-11-19 VITALS
HEIGHT: 64 IN | RESPIRATION RATE: 18 BRPM | DIASTOLIC BLOOD PRESSURE: 98 MMHG | TEMPERATURE: 98.2 F | SYSTOLIC BLOOD PRESSURE: 167 MMHG | OXYGEN SATURATION: 92 % | WEIGHT: 144 LBS | HEART RATE: 82 BPM | BODY MASS INDEX: 24.59 KG/M2

## 2024-11-19 DIAGNOSIS — M47.26 OSTEOARTHRITIS OF SPINE WITH RADICULOPATHY, LUMBAR REGION: ICD-10-CM

## 2024-11-19 DIAGNOSIS — G89.4 CHRONIC PAIN SYNDROME: ICD-10-CM

## 2024-11-19 DIAGNOSIS — M81.0 SENILE OSTEOPOROSIS: Primary | ICD-10-CM

## 2024-11-19 DIAGNOSIS — M47.816 LUMBAR SPONDYLOSIS: ICD-10-CM

## 2024-11-19 DIAGNOSIS — M51.362 DEGENERATION OF INTERVERTEBRAL DISC OF LUMBAR REGION WITH DISCOGENIC BACK PAIN AND LOWER EXTREMITY PAIN: ICD-10-CM

## 2024-11-19 PROCEDURE — 96372 THER/PROPH/DIAG INJ SC/IM: CPT

## 2024-11-19 PROCEDURE — 6360000002 HC RX W HCPCS: Performed by: NURSE PRACTITIONER

## 2024-11-19 PROCEDURE — 72040 X-RAY EXAM NECK SPINE 2-3 VW: CPT

## 2024-11-19 PROCEDURE — 72100 X-RAY EXAM L-S SPINE 2/3 VWS: CPT

## 2024-11-19 RX ORDER — ACETAMINOPHEN 325 MG/1
650 TABLET ORAL
Status: CANCELLED | OUTPATIENT
Start: 2025-05-20

## 2024-11-19 RX ORDER — ALBUTEROL SULFATE 90 UG/1
4 INHALANT RESPIRATORY (INHALATION) PRN
Status: CANCELLED | OUTPATIENT
Start: 2025-05-20

## 2024-11-19 RX ORDER — DIPHENHYDRAMINE HYDROCHLORIDE 50 MG/ML
50 INJECTION INTRAMUSCULAR; INTRAVENOUS
Status: CANCELLED | OUTPATIENT
Start: 2025-05-20

## 2024-11-19 RX ORDER — SODIUM CHLORIDE 9 MG/ML
INJECTION, SOLUTION INTRAVENOUS CONTINUOUS
Status: CANCELLED | OUTPATIENT
Start: 2025-05-20

## 2024-11-19 RX ORDER — HYDROCORTISONE SODIUM SUCCINATE 100 MG/2ML
100 INJECTION INTRAMUSCULAR; INTRAVENOUS
Status: CANCELLED | OUTPATIENT
Start: 2025-05-20

## 2024-11-19 RX ORDER — EPINEPHRINE 1 MG/ML
0.3 INJECTION, SOLUTION, CONCENTRATE INTRAVENOUS PRN
Status: CANCELLED | OUTPATIENT
Start: 2025-05-20

## 2024-11-19 RX ORDER — ONDANSETRON 2 MG/ML
8 INJECTION INTRAMUSCULAR; INTRAVENOUS
Status: CANCELLED | OUTPATIENT
Start: 2025-05-20

## 2024-11-19 RX ADMIN — DENOSUMAB 60 MG: 60 INJECTION SUBCUTANEOUS at 13:14

## 2024-11-19 ASSESSMENT — PAIN SCALES - GENERAL: PAINLEVEL_OUTOF10: 6

## 2024-11-19 ASSESSMENT — PAIN DESCRIPTION - DESCRIPTORS: DESCRIPTORS: ACHING

## 2024-11-19 ASSESSMENT — PAIN DESCRIPTION - LOCATION: LOCATION: BACK

## 2024-11-19 NOTE — PROGRESS NOTES
Outpatient Infusion Center  [facility]     Prolia Visit    NAME:  Lalita Pimentel  YOB: 1945  MEDICAL RECORD NUMBER:  [id number]  Episode Date:  11/19/2024    Patient arrived to Outpatient Infusion Center   [] per wheelchair   [x] ambulatory     Patient alert and oriented x4.    Is this the patient's first Prolia Injection? No     Date of last Prolia Injection ? May 16, 2024.     Did the patient experience any adverse reactions to Prolia Injection? No    Any recent oral or dental surgery? No    Any recent active fever, infections and/or illnesses? No    Patient has history of pathological fracture? No    Patient has had a recent fracture due to trauma or injury? No    Patient has had a recent orthopedic surgery or procedure done? No    Approximate date of last Dexa scan? 12/08/2023       BP (!) 167/98   Pulse 82   Temp 98.2 °F (36.8 °C) (Oral)   Resp 18   Ht 1.626 m (5' 4\")   Wt 65.3 kg (144 lb)   SpO2 92%   BMI 24.72 kg/m²     Current Lab Data:    Calcium:    Lab Results   Component Value Date/Time    CALCIUM 9.1 10/31/2024 02:21 PM       Patient Currently taking Calcium Supplements? Yes      Prolia administered: Yes    Prolia dosage: 60 mg was administered slowly subcutaneously into the left upper arm.      Response to treatment:  Well tolerated by patient.     Due for next dose of Prolia after May 19, 2025.     Electronically signed by Mirela Durán RN on 11/19/2024 at 1:07 PM

## 2024-11-19 NOTE — DISCHARGE INSTRUCTIONS
Outpatient Infusion Discharge Instructions  [facility]  [address] [place], [place] [address]  Telephone: [phone]      FAX [phone]    NAME:  Lalita Pimentel  YOB: 1945  MEDICAL RECORD NUMBER:  [id number]  DATE:  @ED@    Reason for Outpatient Infusion Visit: Prolia      If you develop any these symptoms please contact you Doctor    [x] Nausea and/or vomiting not relieved with medication   [x] Swelling, redness, and/or bleeding at injection or IV site    [x] Fever or chills  [x] Rash or itching   [x] Shortness of breath  [x] Please review After Visit Summary (AVS) information on    [x] Other / Next appointment after May 19 ,2025      Outpatient Infusion Center Information: Should you experience any significant changes in your health or have questions about your care please contact the [facility] at [phone] MONDAY - FRIDAY 8:00 am - 4:00 pm.  If you need help outside these hours and cannot wait until we are again available, contact your Primary Care Physician or go to the hospital emergency room.       Electronically signed by Mirela Durán RN on 11/19/2024 at 1:10 PM

## 2024-11-26 DIAGNOSIS — G89.4 CHRONIC PAIN SYNDROME: ICD-10-CM

## 2024-11-26 DIAGNOSIS — F33.1 MODERATE EPISODE OF RECURRENT MAJOR DEPRESSIVE DISORDER (HCC): ICD-10-CM

## 2024-11-26 RX ORDER — DULOXETIN HYDROCHLORIDE 60 MG/1
CAPSULE, DELAYED RELEASE ORAL DAILY
Qty: 90 CAPSULE | Refills: 0 | Status: SHIPPED | OUTPATIENT
Start: 2024-11-26

## 2024-12-16 ENCOUNTER — OFFICE VISIT (OUTPATIENT)
Dept: FAMILY MEDICINE CLINIC | Age: 79
End: 2024-12-16
Payer: MEDICARE

## 2024-12-16 VITALS
RESPIRATION RATE: 22 BRPM | DIASTOLIC BLOOD PRESSURE: 60 MMHG | SYSTOLIC BLOOD PRESSURE: 104 MMHG | OXYGEN SATURATION: 92 % | HEIGHT: 64 IN | WEIGHT: 140.6 LBS | BODY MASS INDEX: 24.01 KG/M2 | TEMPERATURE: 98.1 F | HEART RATE: 96 BPM

## 2024-12-16 DIAGNOSIS — E11.22 TYPE 2 DIABETES MELLITUS WITH STAGE 3A CHRONIC KIDNEY DISEASE, WITHOUT LONG-TERM CURRENT USE OF INSULIN (HCC): ICD-10-CM

## 2024-12-16 DIAGNOSIS — E78.00 HYPERCHOLESTEREMIA: ICD-10-CM

## 2024-12-16 DIAGNOSIS — F33.1 MODERATE EPISODE OF RECURRENT MAJOR DEPRESSIVE DISORDER (HCC): ICD-10-CM

## 2024-12-16 DIAGNOSIS — N18.31 TYPE 2 DIABETES MELLITUS WITH STAGE 3A CHRONIC KIDNEY DISEASE, WITHOUT LONG-TERM CURRENT USE OF INSULIN (HCC): ICD-10-CM

## 2024-12-16 DIAGNOSIS — Z00.00 MEDICARE ANNUAL WELLNESS VISIT, SUBSEQUENT: Primary | ICD-10-CM

## 2024-12-16 DIAGNOSIS — Z23 NEED FOR INFLUENZA VACCINATION: ICD-10-CM

## 2024-12-16 DIAGNOSIS — J44.9 COPD, SEVERE (HCC): ICD-10-CM

## 2024-12-16 DIAGNOSIS — M81.0 AGE-RELATED OSTEOPOROSIS WITHOUT CURRENT PATHOLOGICAL FRACTURE: ICD-10-CM

## 2024-12-16 DIAGNOSIS — K21.9 GASTROESOPHAGEAL REFLUX DISEASE WITHOUT ESOPHAGITIS: ICD-10-CM

## 2024-12-16 LAB — HBA1C MFR BLD: 6.3 %

## 2024-12-16 PROCEDURE — G8399 PT W/DXA RESULTS DOCUMENT: HCPCS | Performed by: NURSE PRACTITIONER

## 2024-12-16 PROCEDURE — 3044F HG A1C LEVEL LT 7.0%: CPT | Performed by: NURSE PRACTITIONER

## 2024-12-16 PROCEDURE — 83036 HEMOGLOBIN GLYCOSYLATED A1C: CPT | Performed by: NURSE PRACTITIONER

## 2024-12-16 PROCEDURE — 1123F ACP DISCUSS/DSCN MKR DOCD: CPT | Performed by: NURSE PRACTITIONER

## 2024-12-16 PROCEDURE — 1160F RVW MEDS BY RX/DR IN RCRD: CPT | Performed by: NURSE PRACTITIONER

## 2024-12-16 PROCEDURE — 1159F MED LIST DOCD IN RCRD: CPT | Performed by: NURSE PRACTITIONER

## 2024-12-16 PROCEDURE — 1090F PRES/ABSN URINE INCON ASSESS: CPT | Performed by: NURSE PRACTITIONER

## 2024-12-16 PROCEDURE — 1036F TOBACCO NON-USER: CPT | Performed by: NURSE PRACTITIONER

## 2024-12-16 PROCEDURE — 90653 IIV ADJUVANT VACCINE IM: CPT | Performed by: NURSE PRACTITIONER

## 2024-12-16 PROCEDURE — G0008 ADMIN INFLUENZA VIRUS VAC: HCPCS | Performed by: NURSE PRACTITIONER

## 2024-12-16 PROCEDURE — 3023F SPIROM DOC REV: CPT | Performed by: NURSE PRACTITIONER

## 2024-12-16 PROCEDURE — G8420 CALC BMI NORM PARAMETERS: HCPCS | Performed by: NURSE PRACTITIONER

## 2024-12-16 PROCEDURE — G0439 PPPS, SUBSEQ VISIT: HCPCS | Performed by: NURSE PRACTITIONER

## 2024-12-16 PROCEDURE — G8482 FLU IMMUNIZE ORDER/ADMIN: HCPCS | Performed by: NURSE PRACTITIONER

## 2024-12-16 PROCEDURE — 99213 OFFICE O/P EST LOW 20 MIN: CPT | Performed by: NURSE PRACTITIONER

## 2024-12-16 PROCEDURE — G8427 DOCREV CUR MEDS BY ELIG CLIN: HCPCS | Performed by: NURSE PRACTITIONER

## 2024-12-16 ASSESSMENT — PATIENT HEALTH QUESTIONNAIRE - PHQ9
10. IF YOU CHECKED OFF ANY PROBLEMS, HOW DIFFICULT HAVE THESE PROBLEMS MADE IT FOR YOU TO DO YOUR WORK, TAKE CARE OF THINGS AT HOME, OR GET ALONG WITH OTHER PEOPLE: SOMEWHAT DIFFICULT
SUM OF ALL RESPONSES TO PHQ QUESTIONS 1-9: 7
3. TROUBLE FALLING OR STAYING ASLEEP: NOT AT ALL
4. FEELING TIRED OR HAVING LITTLE ENERGY: NEARLY EVERY DAY
2. FEELING DOWN, DEPRESSED OR HOPELESS: SEVERAL DAYS
SUM OF ALL RESPONSES TO PHQ QUESTIONS 1-9: 7
9. THOUGHTS THAT YOU WOULD BE BETTER OFF DEAD, OR OF HURTING YOURSELF: NOT AT ALL
6. FEELING BAD ABOUT YOURSELF - OR THAT YOU ARE A FAILURE OR HAVE LET YOURSELF OR YOUR FAMILY DOWN: NOT AT ALL
SUM OF ALL RESPONSES TO PHQ9 QUESTIONS 1 & 2: 3
SUM OF ALL RESPONSES TO PHQ QUESTIONS 1-9: 7
8. MOVING OR SPEAKING SO SLOWLY THAT OTHER PEOPLE COULD HAVE NOTICED. OR THE OPPOSITE, BEING SO FIGETY OR RESTLESS THAT YOU HAVE BEEN MOVING AROUND A LOT MORE THAN USUAL: NOT AT ALL
5. POOR APPETITE OR OVEREATING: NOT AT ALL
1. LITTLE INTEREST OR PLEASURE IN DOING THINGS: MORE THAN HALF THE DAYS
7. TROUBLE CONCENTRATING ON THINGS, SUCH AS READING THE NEWSPAPER OR WATCHING TELEVISION: SEVERAL DAYS
SUM OF ALL RESPONSES TO PHQ QUESTIONS 1-9: 7

## 2024-12-16 NOTE — PATIENT INSTRUCTIONS
Preventive Care list are included within your After Visit Summary for your review.    Other Preventive Recommendations:    A preventive eye exam performed by an eye specialist is recommended every 1-2 years to screen for glaucoma; cataracts, macular degeneration, and other eye disorders.  A preventive dental visit is recommended every 6 months.  Try to get at least 150 minutes of exercise per week or 10,000 steps per day on a pedometer .  Order or download the FREE \"Exercise & Physical Activity: Your Everyday Guide\" from [employer]. Call 1-300.189.2977 or search [employer] online.  You need 8102-2800 mg of calcium and 2603-4880 IU of vitamin D per day. It is possible to meet your calcium requirement with diet alone, but a vitamin D supplement is usually necessary to meet this goal.  When exposed to the sun, use a sunscreen that protects against both UVA and UVB radiation with an SPF of 30 or greater. Reapply every 2 to 3 hours or after sweating, drying off with a towel, or swimming.  Always wear a seat belt when traveling in a car. Always wear a helmet when riding a bicycle or motorcycle.

## 2024-12-16 NOTE — PROGRESS NOTES
Medicare Annual Wellness Visit    Lalita Pimentel is here for Medicare AWV (Has not been checking sugar.)    Assessment & Plan   Medicare annual wellness visit, subsequent  -     Comprehensive Metabolic Panel; Future  -     Lipid, Fasting; Future  -     CBC with Auto Differential; Future  Healthy lifestyles reviewed: Diet, aerobic exercise, sunscreen, vision and dental exams.  Advised to obtain RSV vaccine and shingles vaccine at her pharmacy.    COPD, severe (HCC)  Continue f/u with pulmonologist Dr. Garza   On breo inhaler daily and albuterol inhaler PRN  Continue smoking cessation     Hypercholesteremia  -     Comprehensive Metabolic Panel; Future  -     Lipid, Fasting; Future  Continue atorvastatin 80 mg daily.  Advised to continue low fat/choleserol diet supplemented with aerobic exercise.    Gastroesophageal reflux disease without esophagitis  Continue Protonix daily.  Advised to avoid foods associated with acid reflux.    Type 2 diabetes mellitus with stage 3a chronic kidney disease, without long-term current use of insulin ([facility])  -     POCT glycosylated hemoglobin (Hb A1C)- 6.3%  -     Comprehensive Metabolic Panel; Future  -     Microalbumin / Creatinine Urine Ratio; Future  -     CBC with Auto Differential; Future  Diet controlled   Advised to continue low carb/fat diet supplemented with aerobic exercise.  To stay hydrated with water due to CKD and avoid NSAIDs    Age-related osteoporosis without current pathological fracture- started alendronate 9/2021  -     Vitamin D 25 Hydroxy; Future  Continue calcium and vitamin D supplements and prolia injection every 6 months   Last DEXA scan 12/2023. Repeat in 2 years.     Moderate episode of recurrent major depressive disorder (HCC)  Stable. Continue cymbalta 60 mg in AM and 30 mg in PM    Need for influenza vaccination  -     Influenza, FLUAD Trivalent, (age 65 y+), IM, Preservative Free, 0.5mL    Recommendations for Preventive Services Due: see orders and

## 2024-12-17 ENCOUNTER — OFFICE VISIT (OUTPATIENT)
Dept: PAIN MANAGEMENT | Age: 79
End: 2024-12-17
Payer: MEDICARE

## 2024-12-17 VITALS
BODY MASS INDEX: 24.19 KG/M2 | HEART RATE: 89 BPM | DIASTOLIC BLOOD PRESSURE: 78 MMHG | OXYGEN SATURATION: 90 % | WEIGHT: 141 LBS | SYSTOLIC BLOOD PRESSURE: 109 MMHG

## 2024-12-17 DIAGNOSIS — R25.2 MUSCLE CRAMPING: ICD-10-CM

## 2024-12-17 DIAGNOSIS — M70.61 TROCHANTERIC BURSITIS OF RIGHT HIP: ICD-10-CM

## 2024-12-17 DIAGNOSIS — Z91.89 AT RISK FOR RESPIRATORY DEPRESSION DUE TO OPIOID: ICD-10-CM

## 2024-12-17 DIAGNOSIS — M79.7 FIBROMYALGIA: ICD-10-CM

## 2024-12-17 DIAGNOSIS — M51.362 DEGENERATION OF INTERVERTEBRAL DISC OF LUMBAR REGION WITH DISCOGENIC BACK PAIN AND LOWER EXTREMITY PAIN: ICD-10-CM

## 2024-12-17 DIAGNOSIS — M47.816 LUMBAR SPONDYLOSIS: ICD-10-CM

## 2024-12-17 DIAGNOSIS — G89.4 CHRONIC PAIN SYNDROME: ICD-10-CM

## 2024-12-17 DIAGNOSIS — M47.26 OSTEOARTHRITIS OF SPINE WITH RADICULOPATHY, LUMBAR REGION: ICD-10-CM

## 2024-12-17 DIAGNOSIS — M50.30 DEGENERATIVE DISC DISEASE, CERVICAL: ICD-10-CM

## 2024-12-17 PROCEDURE — G8399 PT W/DXA RESULTS DOCUMENT: HCPCS | Performed by: INTERNAL MEDICINE

## 2024-12-17 PROCEDURE — 1090F PRES/ABSN URINE INCON ASSESS: CPT | Performed by: INTERNAL MEDICINE

## 2024-12-17 PROCEDURE — G8427 DOCREV CUR MEDS BY ELIG CLIN: HCPCS | Performed by: INTERNAL MEDICINE

## 2024-12-17 PROCEDURE — G8420 CALC BMI NORM PARAMETERS: HCPCS | Performed by: INTERNAL MEDICINE

## 2024-12-17 PROCEDURE — 1159F MED LIST DOCD IN RCRD: CPT | Performed by: INTERNAL MEDICINE

## 2024-12-17 PROCEDURE — 1123F ACP DISCUSS/DSCN MKR DOCD: CPT | Performed by: INTERNAL MEDICINE

## 2024-12-17 PROCEDURE — 1036F TOBACCO NON-USER: CPT | Performed by: INTERNAL MEDICINE

## 2024-12-17 PROCEDURE — G8482 FLU IMMUNIZE ORDER/ADMIN: HCPCS | Performed by: INTERNAL MEDICINE

## 2024-12-17 PROCEDURE — 99213 OFFICE O/P EST LOW 20 MIN: CPT | Performed by: INTERNAL MEDICINE

## 2024-12-17 RX ORDER — OXYCODONE AND ACETAMINOPHEN 7.5; 325 MG/1; MG/1
1 TABLET ORAL EVERY 6 HOURS PRN
Qty: 120 TABLET | Refills: 0 | Status: SHIPPED | OUTPATIENT
Start: 2024-12-17 | End: 2025-01-21

## 2024-12-17 RX ORDER — GABAPENTIN 600 MG/1
600 TABLET ORAL 2 TIMES DAILY
Qty: 60 TABLET | Refills: 1 | Status: SHIPPED | OUTPATIENT
Start: 2024-12-17 | End: 2025-02-15

## 2024-12-17 RX ORDER — TRAZODONE HYDROCHLORIDE 50 MG/1
50-100 TABLET, FILM COATED ORAL NIGHTLY
Qty: 60 TABLET | Refills: 1 | Status: SHIPPED | OUTPATIENT
Start: 2024-12-17

## 2024-12-17 RX ORDER — PANTOPRAZOLE SODIUM 40 MG/1
40 TABLET, DELAYED RELEASE ORAL DAILY PRN
Qty: 30 TABLET | Refills: 1 | Status: SHIPPED | OUTPATIENT
Start: 2024-12-17

## 2024-12-17 NOTE — PROGRESS NOTES
Lalita Pimentel  1945  [id number]      HISTORY OF PRESENT ILLNESS:  Ms. Pimentel is a 79 y.o. female returns for a follow up visit for pain management  She has a diagnosis of   1. Chronic pain syndrome    2. Osteoarthritis of spine with radiculopathy, lumbar region    3. Muscle cramping    4. Moderate episode of recurrent major depressive disorder (HCC)    5. Fibromyalgia    6. Lumbar spondylosis    7. Gastroesophageal reflux disease without esophagitis    8. At risk for respiratory depression due to opioid    9. Degeneration of intervertebral disc of lumbar region with discogenic back pain and lower extremity pain    10. Trochanteric bursitis of right hip    11. Primary insomnia    12. Degenerative disc disease, cervical    .      As per information/history obtained from the PADT(patient assessment and documentation tool) -  She complains of pain in the lower back with radiation to the buttocks She rates the pain 7/10 and describes it as aching, burning.  Pain is made worse by: movement, walking, standing, sitting, bending, lifting. She denies any side effects from the current pain regimen. Patient reports that since last follow up visit the physical functioning is unchanged, family/social relationships are unchanged, mood is unchanged sleep patterns are unchanged. Ms. Pimentel states that since starting the treatment with the current regimen the  overall functioning  in the above aspects is  better, Patient denies misusing/abusing her narcotic pain medications or using any illegal drugs.  There are No indicators for possible drug abuse, addiction or diversion problems.   Upon obtaining the medical history from Ms. Pimentel regarding today's office visit for her presenting problems, patient states her back is hurting more. Ms. Pimentel states she had xray's done of cervical and lumbar spine. She states she has been compliant with her regimen along with Percocet 3-4 per day. Patient denies any constipation symptoms.

## 2025-01-10 DIAGNOSIS — E78.00 HYPERCHOLESTEREMIA: ICD-10-CM

## 2025-01-13 RX ORDER — ATORVASTATIN CALCIUM 80 MG/1
TABLET, FILM COATED ORAL
Qty: 90 TABLET | Refills: 1 | Status: SHIPPED | OUTPATIENT
Start: 2025-01-13

## 2025-01-21 ENCOUNTER — OFFICE VISIT (OUTPATIENT)
Dept: PAIN MANAGEMENT | Age: 80
End: 2025-01-21
Payer: MEDICARE

## 2025-01-21 VITALS
WEIGHT: 136 LBS | OXYGEN SATURATION: 93 % | SYSTOLIC BLOOD PRESSURE: 189 MMHG | DIASTOLIC BLOOD PRESSURE: 114 MMHG | BODY MASS INDEX: 23.33 KG/M2 | HEART RATE: 97 BPM

## 2025-01-21 DIAGNOSIS — M70.61 TROCHANTERIC BURSITIS OF RIGHT HIP: ICD-10-CM

## 2025-01-21 DIAGNOSIS — M47.816 LUMBAR SPONDYLOSIS: ICD-10-CM

## 2025-01-21 DIAGNOSIS — Z91.89 AT RISK FOR RESPIRATORY DEPRESSION DUE TO OPIOID: ICD-10-CM

## 2025-01-21 DIAGNOSIS — R25.2 MUSCLE CRAMPING: ICD-10-CM

## 2025-01-21 DIAGNOSIS — M50.30 DEGENERATIVE DISC DISEASE, CERVICAL: ICD-10-CM

## 2025-01-21 DIAGNOSIS — M51.362 DEGENERATION OF INTERVERTEBRAL DISC OF LUMBAR REGION WITH DISCOGENIC BACK PAIN AND LOWER EXTREMITY PAIN: ICD-10-CM

## 2025-01-21 DIAGNOSIS — M47.26 OSTEOARTHRITIS OF SPINE WITH RADICULOPATHY, LUMBAR REGION: ICD-10-CM

## 2025-01-21 DIAGNOSIS — G89.4 CHRONIC PAIN SYNDROME: ICD-10-CM

## 2025-01-21 DIAGNOSIS — M79.7 FIBROMYALGIA: ICD-10-CM

## 2025-01-21 PROCEDURE — 1123F ACP DISCUSS/DSCN MKR DOCD: CPT | Performed by: INTERNAL MEDICINE

## 2025-01-21 PROCEDURE — 1090F PRES/ABSN URINE INCON ASSESS: CPT | Performed by: INTERNAL MEDICINE

## 2025-01-21 PROCEDURE — G8427 DOCREV CUR MEDS BY ELIG CLIN: HCPCS | Performed by: INTERNAL MEDICINE

## 2025-01-21 PROCEDURE — G8420 CALC BMI NORM PARAMETERS: HCPCS | Performed by: INTERNAL MEDICINE

## 2025-01-21 PROCEDURE — 99213 OFFICE O/P EST LOW 20 MIN: CPT | Performed by: INTERNAL MEDICINE

## 2025-01-21 PROCEDURE — 1159F MED LIST DOCD IN RCRD: CPT | Performed by: INTERNAL MEDICINE

## 2025-01-21 PROCEDURE — 1036F TOBACCO NON-USER: CPT | Performed by: INTERNAL MEDICINE

## 2025-01-21 PROCEDURE — G8399 PT W/DXA RESULTS DOCUMENT: HCPCS | Performed by: INTERNAL MEDICINE

## 2025-01-21 RX ORDER — TRAZODONE HYDROCHLORIDE 50 MG/1
50-100 TABLET, FILM COATED ORAL NIGHTLY
Qty: 60 TABLET | Refills: 1 | Status: SHIPPED | OUTPATIENT
Start: 2025-01-21

## 2025-01-21 RX ORDER — GABAPENTIN 600 MG/1
600 TABLET ORAL 2 TIMES DAILY
Qty: 60 TABLET | Refills: 1 | Status: SHIPPED | OUTPATIENT
Start: 2025-01-21 | End: 2025-03-22

## 2025-01-21 RX ORDER — OXYCODONE AND ACETAMINOPHEN 7.5; 325 MG/1; MG/1
1 TABLET ORAL EVERY 6 HOURS PRN
Qty: 120 TABLET | Refills: 0 | Status: SHIPPED | OUTPATIENT
Start: 2025-01-21 | End: 2025-02-25

## 2025-01-21 RX ORDER — PANTOPRAZOLE SODIUM 40 MG/1
40 TABLET, DELAYED RELEASE ORAL DAILY PRN
Qty: 30 TABLET | Refills: 1 | Status: SHIPPED | OUTPATIENT
Start: 2025-01-21

## 2025-01-21 NOTE — PROGRESS NOTES
ADL's. YES  -Ability to do home exercises independently. YES  -Ability to do household chores, indoor work and social and leisure activities. YES  -Improve psychosocial and physical functioning.YES  -Ability to do outside chores/ yard work YES     2.   -Improving sleep to 6-7 hours a night. Restorative sleep either with assist device if recommended or with medications. YES  -Improve mood/ anxiety and depression symptoms such as crying spells, low energy, problems with concentration, motivation.YES   3.   -Reduction of reliance on opioid analgesia/more appropriate opioid use.   -Using the least effective dose to help with pain control and making a concerted effort to decrease the dose when possible. YES     Risks and benefits of the medications and other alternative treatments have been/were  discussed with the patient. Any questions on the  common side effects of these medications were also answered.  She was advised against drinking alcohol with the narcotic pain medicines, advised against driving or handling machinery when  starting or adjusting the dose of medicines, feeling groggy or drowsy, or if having any cognitive issues related to the current medications. Sheis fully aware of the risk of overdose and death, if medicines are misused and not taken as prescribed. If she develops new symptoms or if the symptoms worsen, she was told to call the office. .  Thank you for allowing me to participate in the care of this patient.    Babatunde Leach MD    Cc: Belkis Ellsworth, APRN - CNP

## 2025-02-04 ENCOUNTER — OFFICE VISIT (OUTPATIENT)
Dept: PULMONOLOGY | Age: 80
End: 2025-02-04
Payer: MEDICARE

## 2025-02-04 VITALS
RESPIRATION RATE: 18 BRPM | BODY MASS INDEX: 25.67 KG/M2 | HEART RATE: 68 BPM | OXYGEN SATURATION: 93 % | HEIGHT: 62 IN | TEMPERATURE: 96.3 F | SYSTOLIC BLOOD PRESSURE: 104 MMHG | WEIGHT: 139.5 LBS | DIASTOLIC BLOOD PRESSURE: 62 MMHG

## 2025-02-04 DIAGNOSIS — J30.89 OTHER ALLERGIC RHINITIS: ICD-10-CM

## 2025-02-04 DIAGNOSIS — J44.9 COPD, SEVERE (HCC): Primary | ICD-10-CM

## 2025-02-04 PROBLEM — J96.22 ACUTE ON CHRONIC RESPIRATORY FAILURE WITH HYPOXIA AND HYPERCAPNIA (HCC): Status: RESOLVED | Noted: 2022-07-18 | Resolved: 2025-02-04

## 2025-02-04 PROBLEM — J96.21 ACUTE ON CHRONIC RESPIRATORY FAILURE WITH HYPOXIA AND HYPERCAPNIA (HCC): Status: RESOLVED | Noted: 2022-07-18 | Resolved: 2025-02-04

## 2025-02-04 PROBLEM — R06.89 ACUTE RESPIRATORY INSUFFICIENCY: Status: RESOLVED | Noted: 2022-08-04 | Resolved: 2025-02-04

## 2025-02-04 PROCEDURE — G2211 COMPLEX E/M VISIT ADD ON: HCPCS | Performed by: INTERNAL MEDICINE

## 2025-02-04 PROCEDURE — 1159F MED LIST DOCD IN RCRD: CPT | Performed by: INTERNAL MEDICINE

## 2025-02-04 PROCEDURE — G8427 DOCREV CUR MEDS BY ELIG CLIN: HCPCS | Performed by: INTERNAL MEDICINE

## 2025-02-04 PROCEDURE — 99214 OFFICE O/P EST MOD 30 MIN: CPT | Performed by: INTERNAL MEDICINE

## 2025-02-04 PROCEDURE — 1090F PRES/ABSN URINE INCON ASSESS: CPT | Performed by: INTERNAL MEDICINE

## 2025-02-04 PROCEDURE — 1123F ACP DISCUSS/DSCN MKR DOCD: CPT | Performed by: INTERNAL MEDICINE

## 2025-02-04 PROCEDURE — G8399 PT W/DXA RESULTS DOCUMENT: HCPCS | Performed by: INTERNAL MEDICINE

## 2025-02-04 PROCEDURE — G8419 CALC BMI OUT NRM PARAM NOF/U: HCPCS | Performed by: INTERNAL MEDICINE

## 2025-02-04 PROCEDURE — 1036F TOBACCO NON-USER: CPT | Performed by: INTERNAL MEDICINE

## 2025-02-04 PROCEDURE — 3023F SPIROM DOC REV: CPT | Performed by: INTERNAL MEDICINE

## 2025-02-04 NOTE — ASSESSMENT & PLAN NOTE
Using Zyrtec.  Currently having more problems with sinus dryness.  Therefore, discussed sinus rinse.  Sample given of saline spray

## 2025-02-04 NOTE — PROGRESS NOTES
REASON FOR CONSULTATION/CC: COPD          PCP: Belkis Silva, APRN - CNP    HISTORY OF PRESENT ILLNESS: Lalita Pimentel is a 79 y.o. year old female with a history of COPD who presents :           COPD Assessment Test (CAT)                                          COPD    Returned to [facility].   No issues with cost or side effects     Duoneb's      allergic rhinitis  Zyrtec.                       Objective:   PHYSICAL EXAM:  Blood pressure 104/62, pulse 68, temperature (!) 96.3 °F (35.7 °C), resp. rate 18, height 1.575 m (5' 2\"), weight 63.3 kg (139 lb 8 oz), SpO2 93%, not currently breastfeeding.'    Gen: No distress.    Eyes:    ENT:     Neck:    Resp: No accessory muscle use. No crackles. no wheezes, not musical. No rhonchi.    CV: Regular rate. Regular rhythm. No murmur or rub. No edema.   GI:    Skin:    Lymph:    M/S: No cyanosis. No clubbing.     Neuro: Moves all four extremities.   Psych: Oriented x 3. No anxiety.  Awake. Alert. Intact judgement and insight.          Data Reviewed:        Assessment:     COPD, Moderately severe FEV1 59%  Tobacco abuse.   - quit 2022.   Pulmonary nodules, new pulmonary nodule 2022  Right upper lobe Pseudomonas pneumonia with abscess with substantial fibrosis and right upper lobe collapse leading to restriction    Plan:        Problem List Items Addressed This Visit       Other allergic rhinitis     Using Zyrtec.  Currently having more problems with sinus dryness.  Therefore, discussed sinus rinse.  Sample given of saline spray         COPD, severe (HCC) - Primary     Stable symptoms on Breo.  Can continue DuoNebs                          This note was transcribed using Dragon Dictation software. Please disregard any translational errors.

## 2025-02-25 ENCOUNTER — OFFICE VISIT (OUTPATIENT)
Dept: PAIN MANAGEMENT | Age: 80
End: 2025-02-25
Payer: MEDICARE

## 2025-02-25 VITALS
SYSTOLIC BLOOD PRESSURE: 105 MMHG | OXYGEN SATURATION: 97 % | WEIGHT: 140 LBS | BODY MASS INDEX: 25.61 KG/M2 | HEART RATE: 84 BPM | DIASTOLIC BLOOD PRESSURE: 65 MMHG

## 2025-02-25 DIAGNOSIS — M51.362 DEGENERATION OF INTERVERTEBRAL DISC OF LUMBAR REGION WITH DISCOGENIC BACK PAIN AND LOWER EXTREMITY PAIN: ICD-10-CM

## 2025-02-25 DIAGNOSIS — M70.61 TROCHANTERIC BURSITIS OF RIGHT HIP: ICD-10-CM

## 2025-02-25 DIAGNOSIS — R25.2 MUSCLE CRAMPING: ICD-10-CM

## 2025-02-25 DIAGNOSIS — Z91.89 AT RISK FOR RESPIRATORY DEPRESSION DUE TO OPIOID: ICD-10-CM

## 2025-02-25 DIAGNOSIS — G89.4 CHRONIC PAIN SYNDROME: ICD-10-CM

## 2025-02-25 DIAGNOSIS — M50.30 DEGENERATIVE DISC DISEASE, CERVICAL: ICD-10-CM

## 2025-02-25 DIAGNOSIS — M47.816 LUMBAR SPONDYLOSIS: ICD-10-CM

## 2025-02-25 DIAGNOSIS — M47.26 OSTEOARTHRITIS OF SPINE WITH RADICULOPATHY, LUMBAR REGION: ICD-10-CM

## 2025-02-25 DIAGNOSIS — M79.7 FIBROMYALGIA: ICD-10-CM

## 2025-02-25 PROCEDURE — 99213 OFFICE O/P EST LOW 20 MIN: CPT | Performed by: INTERNAL MEDICINE

## 2025-02-25 PROCEDURE — G8427 DOCREV CUR MEDS BY ELIG CLIN: HCPCS | Performed by: INTERNAL MEDICINE

## 2025-02-25 PROCEDURE — G8419 CALC BMI OUT NRM PARAM NOF/U: HCPCS | Performed by: INTERNAL MEDICINE

## 2025-02-25 PROCEDURE — 1036F TOBACCO NON-USER: CPT | Performed by: INTERNAL MEDICINE

## 2025-02-25 PROCEDURE — 1159F MED LIST DOCD IN RCRD: CPT | Performed by: INTERNAL MEDICINE

## 2025-02-25 PROCEDURE — 1090F PRES/ABSN URINE INCON ASSESS: CPT | Performed by: INTERNAL MEDICINE

## 2025-02-25 PROCEDURE — 1123F ACP DISCUSS/DSCN MKR DOCD: CPT | Performed by: INTERNAL MEDICINE

## 2025-02-25 PROCEDURE — G8399 PT W/DXA RESULTS DOCUMENT: HCPCS | Performed by: INTERNAL MEDICINE

## 2025-02-25 RX ORDER — OXYCODONE AND ACETAMINOPHEN 7.5; 325 MG/1; MG/1
1 TABLET ORAL EVERY 6 HOURS PRN
Qty: 120 TABLET | Refills: 0 | Status: SHIPPED | OUTPATIENT
Start: 2025-02-25 | End: 2025-04-01

## 2025-02-25 RX ORDER — GABAPENTIN 600 MG/1
600 TABLET ORAL 2 TIMES DAILY
Qty: 60 TABLET | Refills: 1 | Status: SHIPPED | OUTPATIENT
Start: 2025-02-25 | End: 2025-04-26

## 2025-02-25 RX ORDER — PANTOPRAZOLE SODIUM 40 MG/1
40 TABLET, DELAYED RELEASE ORAL DAILY PRN
Qty: 30 TABLET | Refills: 1 | Status: SHIPPED | OUTPATIENT
Start: 2025-02-25

## 2025-02-25 RX ORDER — TRAZODONE HYDROCHLORIDE 50 MG/1
50-100 TABLET ORAL NIGHTLY
Qty: 60 TABLET | Refills: 1 | Status: SHIPPED | OUTPATIENT
Start: 2025-02-25

## 2025-03-06 DIAGNOSIS — F33.1 MODERATE EPISODE OF RECURRENT MAJOR DEPRESSIVE DISORDER (HCC): ICD-10-CM

## 2025-03-06 DIAGNOSIS — G89.4 CHRONIC PAIN SYNDROME: ICD-10-CM

## 2025-03-07 RX ORDER — DULOXETIN HYDROCHLORIDE 60 MG/1
CAPSULE, DELAYED RELEASE ORAL DAILY
Qty: 90 CAPSULE | Refills: 1 | Status: SHIPPED | OUTPATIENT
Start: 2025-03-07

## 2025-04-01 ENCOUNTER — OFFICE VISIT (OUTPATIENT)
Dept: PAIN MANAGEMENT | Age: 80
End: 2025-04-01
Payer: MEDICARE

## 2025-04-01 VITALS
SYSTOLIC BLOOD PRESSURE: 162 MMHG | HEART RATE: 68 BPM | BODY MASS INDEX: 25.06 KG/M2 | DIASTOLIC BLOOD PRESSURE: 68 MMHG | WEIGHT: 137 LBS | OXYGEN SATURATION: 90 %

## 2025-04-01 DIAGNOSIS — M70.61 TROCHANTERIC BURSITIS OF RIGHT HIP: ICD-10-CM

## 2025-04-01 DIAGNOSIS — M79.7 FIBROMYALGIA: ICD-10-CM

## 2025-04-01 DIAGNOSIS — M47.816 LUMBAR SPONDYLOSIS: ICD-10-CM

## 2025-04-01 DIAGNOSIS — M50.30 DEGENERATIVE DISC DISEASE, CERVICAL: ICD-10-CM

## 2025-04-01 DIAGNOSIS — G89.4 CHRONIC PAIN SYNDROME: ICD-10-CM

## 2025-04-01 DIAGNOSIS — M47.26 OSTEOARTHRITIS OF SPINE WITH RADICULOPATHY, LUMBAR REGION: ICD-10-CM

## 2025-04-01 DIAGNOSIS — M51.362 DEGENERATION OF INTERVERTEBRAL DISC OF LUMBAR REGION WITH DISCOGENIC BACK PAIN AND LOWER EXTREMITY PAIN: ICD-10-CM

## 2025-04-01 DIAGNOSIS — R25.2 MUSCLE CRAMPING: ICD-10-CM

## 2025-04-01 PROCEDURE — G8427 DOCREV CUR MEDS BY ELIG CLIN: HCPCS | Performed by: INTERNAL MEDICINE

## 2025-04-01 PROCEDURE — 1036F TOBACCO NON-USER: CPT | Performed by: INTERNAL MEDICINE

## 2025-04-01 PROCEDURE — G8419 CALC BMI OUT NRM PARAM NOF/U: HCPCS | Performed by: INTERNAL MEDICINE

## 2025-04-01 PROCEDURE — 1123F ACP DISCUSS/DSCN MKR DOCD: CPT | Performed by: INTERNAL MEDICINE

## 2025-04-01 PROCEDURE — 1159F MED LIST DOCD IN RCRD: CPT | Performed by: INTERNAL MEDICINE

## 2025-04-01 PROCEDURE — G8399 PT W/DXA RESULTS DOCUMENT: HCPCS | Performed by: INTERNAL MEDICINE

## 2025-04-01 PROCEDURE — 1090F PRES/ABSN URINE INCON ASSESS: CPT | Performed by: INTERNAL MEDICINE

## 2025-04-01 PROCEDURE — 99213 OFFICE O/P EST LOW 20 MIN: CPT | Performed by: INTERNAL MEDICINE

## 2025-04-01 RX ORDER — PANTOPRAZOLE SODIUM 40 MG/1
40 TABLET, DELAYED RELEASE ORAL DAILY PRN
Qty: 30 TABLET | Refills: 1 | Status: SHIPPED | OUTPATIENT
Start: 2025-04-01

## 2025-04-01 RX ORDER — OXYCODONE AND ACETAMINOPHEN 7.5; 325 MG/1; MG/1
1 TABLET ORAL EVERY 6 HOURS PRN
Qty: 100 TABLET | Refills: 0 | Status: SHIPPED | OUTPATIENT
Start: 2025-04-01 | End: 2025-05-06

## 2025-04-01 RX ORDER — GABAPENTIN 600 MG/1
600 TABLET ORAL 2 TIMES DAILY
Qty: 60 TABLET | Refills: 1 | Status: SHIPPED | OUTPATIENT
Start: 2025-04-01 | End: 2025-05-31

## 2025-04-01 NOTE — PROGRESS NOTES
Lalita Pimentel  1945  [id number]      HISTORY OF PRESENT ILLNESS:  Ms. Pimentel is a 79 y.o. female returns for a follow up visit for pain management  She has a diagnosis of   1. Chronic pain syndrome    2. Fibromyalgia    3. Degeneration of intervertebral disc of lumbar region with discogenic back pain and lower extremity pain    4. Gastroesophageal reflux disease without esophagitis    5. Osteoarthritis of spine with radiculopathy, lumbar region    6. Lumbar spondylosis    7. Degenerative disc disease, cervical    8. Primary insomnia    9. Muscle cramping    10. Trochanteric bursitis of right hip    .      As per information/history obtained from the PADT(patient assessment and documentation tool) -  She complains of pain in the lower back with radiation to the buttocks She rates the pain 6/10 and describes it as aching, burning.  Pain is made worse by: movement, walking, standing, sitting, bending, lifting. She denies any side effects from the current pain regimen. Patient reports that since last follow up visit the physical functioning is unchanged, family/social relationships are unchanged, mood is unchanged sleep patterns are unchanged. Ms. Pimentel states that since starting the treatment with the current regimen the  overall functioning  in the above aspects is  better, Patient denies misusing/abusing her narcotic pain medications or using any illegal drugs.  There are No indicators for possible drug abuse, addiction or diversion problems.   Upon obtaining the medical history from Ms. Pimentel regarding today's office visit for her presenting problems, patient states she has been doing fair. She complains of increased pain with changes in weather. Extreme temperatures- rain, cold and damp weather causes increased pain. Ms. Pimentel states she has been compliant her regimen. Patient mentions she is using Percocet along with her non opioid adjuvants. She states she has been managing her house chores. Patient

## 2025-04-29 ENCOUNTER — OFFICE VISIT (OUTPATIENT)
Dept: PAIN MANAGEMENT | Age: 80
End: 2025-04-29
Payer: MEDICARE

## 2025-04-29 VITALS
SYSTOLIC BLOOD PRESSURE: 132 MMHG | DIASTOLIC BLOOD PRESSURE: 86 MMHG | BODY MASS INDEX: 24.51 KG/M2 | OXYGEN SATURATION: 91 % | WEIGHT: 134 LBS | HEART RATE: 79 BPM

## 2025-04-29 DIAGNOSIS — M47.816 LUMBAR SPONDYLOSIS: ICD-10-CM

## 2025-04-29 DIAGNOSIS — K21.9 GASTROESOPHAGEAL REFLUX DISEASE WITHOUT ESOPHAGITIS: ICD-10-CM

## 2025-04-29 DIAGNOSIS — F51.01 PRIMARY INSOMNIA: ICD-10-CM

## 2025-04-29 DIAGNOSIS — M51.362 DEGENERATION OF INTERVERTEBRAL DISC OF LUMBAR REGION WITH DISCOGENIC BACK PAIN AND LOWER EXTREMITY PAIN: ICD-10-CM

## 2025-04-29 DIAGNOSIS — M70.61 TROCHANTERIC BURSITIS OF RIGHT HIP: ICD-10-CM

## 2025-04-29 DIAGNOSIS — N18.31 TYPE 2 DIABETES MELLITUS WITH STAGE 3A CHRONIC KIDNEY DISEASE, WITHOUT LONG-TERM CURRENT USE OF INSULIN (HCC): ICD-10-CM

## 2025-04-29 DIAGNOSIS — M81.0 AGE-RELATED OSTEOPOROSIS WITHOUT CURRENT PATHOLOGICAL FRACTURE: ICD-10-CM

## 2025-04-29 DIAGNOSIS — M79.7 FIBROMYALGIA: ICD-10-CM

## 2025-04-29 DIAGNOSIS — G89.4 CHRONIC PAIN SYNDROME: ICD-10-CM

## 2025-04-29 DIAGNOSIS — E78.00 HYPERCHOLESTEREMIA: ICD-10-CM

## 2025-04-29 DIAGNOSIS — M47.26 OSTEOARTHRITIS OF SPINE WITH RADICULOPATHY, LUMBAR REGION: ICD-10-CM

## 2025-04-29 DIAGNOSIS — E11.22 TYPE 2 DIABETES MELLITUS WITH STAGE 3A CHRONIC KIDNEY DISEASE, WITHOUT LONG-TERM CURRENT USE OF INSULIN (HCC): ICD-10-CM

## 2025-04-29 DIAGNOSIS — Z00.00 MEDICARE ANNUAL WELLNESS VISIT, SUBSEQUENT: ICD-10-CM

## 2025-04-29 DIAGNOSIS — M50.30 DEGENERATIVE DISC DISEASE, CERVICAL: ICD-10-CM

## 2025-04-29 PROCEDURE — 1036F TOBACCO NON-USER: CPT | Performed by: INTERNAL MEDICINE

## 2025-04-29 PROCEDURE — 1159F MED LIST DOCD IN RCRD: CPT | Performed by: INTERNAL MEDICINE

## 2025-04-29 PROCEDURE — 1090F PRES/ABSN URINE INCON ASSESS: CPT | Performed by: INTERNAL MEDICINE

## 2025-04-29 PROCEDURE — 99214 OFFICE O/P EST MOD 30 MIN: CPT | Performed by: INTERNAL MEDICINE

## 2025-04-29 PROCEDURE — G8420 CALC BMI NORM PARAMETERS: HCPCS | Performed by: INTERNAL MEDICINE

## 2025-04-29 PROCEDURE — 1123F ACP DISCUSS/DSCN MKR DOCD: CPT | Performed by: INTERNAL MEDICINE

## 2025-04-29 PROCEDURE — G8427 DOCREV CUR MEDS BY ELIG CLIN: HCPCS | Performed by: INTERNAL MEDICINE

## 2025-04-29 PROCEDURE — G8399 PT W/DXA RESULTS DOCUMENT: HCPCS | Performed by: INTERNAL MEDICINE

## 2025-04-29 RX ORDER — GABAPENTIN 600 MG/1
600 TABLET ORAL 2 TIMES DAILY
Qty: 60 TABLET | Refills: 1 | Status: SHIPPED | OUTPATIENT
Start: 2025-04-29 | End: 2025-06-28

## 2025-04-29 RX ORDER — OXYCODONE AND ACETAMINOPHEN 7.5; 325 MG/1; MG/1
1 TABLET ORAL EVERY 6 HOURS PRN
Qty: 100 TABLET | Refills: 0 | Status: SHIPPED | OUTPATIENT
Start: 2025-04-29 | End: 2025-06-03

## 2025-04-29 RX ORDER — PANTOPRAZOLE SODIUM 40 MG/1
40 TABLET, DELAYED RELEASE ORAL DAILY PRN
Qty: 30 TABLET | Refills: 1 | Status: SHIPPED | OUTPATIENT
Start: 2025-04-29

## 2025-04-29 RX ORDER — TRAZODONE HYDROCHLORIDE 50 MG/1
50-100 TABLET ORAL NIGHTLY
Qty: 60 TABLET | Refills: 1 | Status: SHIPPED | OUTPATIENT
Start: 2025-04-29

## 2025-04-29 NOTE — PROGRESS NOTES
care utilization and  decreased medication consumption.   Will continue to monitor progress towards achieving/maintaining therapeutic goals with special emphasis on  -Improvement in perceived interfernce  of pain with ADL's. YES  -Ability to do home exercises independently. YES  -Ability to do household chores, indoor work and social and leisure activities. YES  -Improve psychosocial and physical functioning.YES  -Ability to do outside chores/ yard work YES    2.   -Improving sleep to 6-7 hours a night. Restorative sleep either with assist device if recommended or with medications. YES  -Improve mood/ anxiety and depression symptoms such as crying spells, low energy, problems with concentration, motivation.YES   3.   -Reduction of reliance on opioid analgesia/more appropriate opioid use.   -Using the least effective dose to help with pain control and making a concerted effort to decrease the dose when possible. YES     Risks and benefits of the medications and other alternative treatments have been/were  discussed with the patient. Any questions on the  common side effects of these medications were also answered.  She was advised against drinking alcohol with the narcotic pain medicines, advised against driving or handling machinery when  starting or adjusting the dose of medicines, feeling groggy or drowsy, or if having any cognitive issues related to the current medications. Sheis fully aware of the risk of overdose and death, if medicines are misused and not taken as prescribed. If she develops new symptoms or if the symptoms worsen, she was told to call the office. .  Thank you for allowing me to participate in the care of this patient.    Babatunde Leach MD    Cc: Belkis Ellsworth, APRN - CNP

## 2025-04-30 ENCOUNTER — RESULTS FOLLOW-UP (OUTPATIENT)
Dept: FAMILY MEDICINE CLINIC | Age: 80
End: 2025-04-30

## 2025-04-30 LAB
25(OH)D3 SERPL-MCNC: 43.2 NG/ML
ALBUMIN SERPL-MCNC: 4.1 G/DL (ref 3.4–5)
ALBUMIN/GLOB SERPL: 2 {RATIO} (ref 1.1–2.2)
ALP SERPL-CCNC: 84 U/L (ref 40–129)
ALT SERPL-CCNC: 15 U/L (ref 10–40)
ANION GAP SERPL CALCULATED.3IONS-SCNC: 10 MMOL/L (ref 3–16)
AST SERPL-CCNC: 16 U/L (ref 15–37)
BASOPHILS # BLD: 0.1 K/UL (ref 0–0.2)
BASOPHILS NFR BLD: 0.8 %
BILIRUB SERPL-MCNC: 0.5 MG/DL (ref 0–1)
BUN SERPL-MCNC: 11 MG/DL (ref 7–20)
CALCIUM SERPL-MCNC: 9.3 MG/DL (ref 8.3–10.6)
CHLORIDE SERPL-SCNC: 103 MMOL/L (ref 99–110)
CHOLEST SERPL-MCNC: 151 MG/DL (ref 0–199)
CO2 SERPL-SCNC: 27 MMOL/L (ref 21–32)
CREAT SERPL-MCNC: 1 MG/DL (ref 0.6–1.2)
CREAT UR-MCNC: 270 MG/DL (ref 28–259)
DEPRECATED RDW RBC AUTO: 14.6 % (ref 12.4–15.4)
EOSINOPHIL # BLD: 0.6 K/UL (ref 0–0.6)
EOSINOPHIL NFR BLD: 9.8 %
GFR SERPLBLD CREATININE-BSD FMLA CKD-EPI: 58 ML/MIN/{1.73_M2}
GLUCOSE SERPL-MCNC: 114 MG/DL (ref 70–99)
HCT VFR BLD AUTO: 39.7 % (ref 36–48)
HDLC SERPL-MCNC: 53 MG/DL (ref 40–60)
HGB BLD-MCNC: 13.1 G/DL (ref 12–16)
LDL CHOLESTEROL: 82 MG/DL
LYMPHOCYTES # BLD: 1.4 K/UL (ref 1–5.1)
LYMPHOCYTES NFR BLD: 21.3 %
MCH RBC QN AUTO: 30.8 PG (ref 26–34)
MCHC RBC AUTO-ENTMCNC: 33.1 G/DL (ref 31–36)
MCV RBC AUTO: 93 FL (ref 80–100)
MICROALBUMIN UR DL<=1MG/L-MCNC: 1.31 MG/DL
MICROALBUMIN/CREAT UR: 4.9 MG/G (ref 0–30)
MONOCYTES # BLD: 0.4 K/UL (ref 0–1.3)
MONOCYTES NFR BLD: 5.4 %
NEUTROPHILS # BLD: 4.1 K/UL (ref 1.7–7.7)
NEUTROPHILS NFR BLD: 62.7 %
PLATELET # BLD AUTO: 324 K/UL (ref 135–450)
PMV BLD AUTO: 8 FL (ref 5–10.5)
POTASSIUM SERPL-SCNC: 4.4 MMOL/L (ref 3.5–5.1)
PROT SERPL-MCNC: 6.2 G/DL (ref 6.4–8.2)
RBC # BLD AUTO: 4.26 M/UL (ref 4–5.2)
SODIUM SERPL-SCNC: 140 MMOL/L (ref 136–145)
TRIGL SERPL-MCNC: 81 MG/DL (ref 0–150)
VLDLC SERPL CALC-MCNC: 16 MG/DL
WBC # BLD AUTO: 6.6 K/UL (ref 4–11)

## 2025-05-02 DIAGNOSIS — F33.1 MODERATE EPISODE OF RECURRENT MAJOR DEPRESSIVE DISORDER (HCC): ICD-10-CM

## 2025-05-02 RX ORDER — DULOXETIN HYDROCHLORIDE 30 MG/1
CAPSULE, DELAYED RELEASE ORAL
Qty: 90 CAPSULE | Refills: 0 | Status: SHIPPED | OUTPATIENT
Start: 2025-05-02

## 2025-05-12 ENCOUNTER — TELEPHONE (OUTPATIENT)
Dept: FAMILY MEDICINE CLINIC | Age: 80
End: 2025-05-12

## 2025-05-12 DIAGNOSIS — M81.0 AGE-RELATED OSTEOPOROSIS WITHOUT CURRENT PATHOLOGICAL FRACTURE: Primary | ICD-10-CM

## 2025-05-12 RX ORDER — DENOSUMAB 60 MG/ML
60 INJECTION SUBCUTANEOUS ONCE
Qty: 1 ML | Refills: 0 | Status: ON HOLD | OUTPATIENT
Start: 2025-05-12 | End: 2025-05-12

## 2025-05-12 NOTE — TELEPHONE ENCOUNTER
We received a fax from the infusion center that patient is due for her next Prolia injection.  Can order be placed?  Last labs were 4/29/2025.

## 2025-05-14 RX ORDER — EPINEPHRINE 1 MG/ML
0.3 INJECTION, SOLUTION, CONCENTRATE INTRAVENOUS PRN
OUTPATIENT
Start: 2025-05-14

## 2025-05-14 RX ORDER — ALBUTEROL SULFATE 90 UG/1
4 INHALANT RESPIRATORY (INHALATION) PRN
OUTPATIENT
Start: 2025-05-14

## 2025-05-14 RX ORDER — SODIUM CHLORIDE 9 MG/ML
INJECTION, SOLUTION INTRAVENOUS CONTINUOUS
OUTPATIENT
Start: 2025-05-14

## 2025-05-14 RX ORDER — ACETAMINOPHEN 325 MG/1
650 TABLET ORAL
OUTPATIENT
Start: 2025-05-14

## 2025-05-14 RX ORDER — HYDROCORTISONE SODIUM SUCCINATE 100 MG/2ML
100 INJECTION INTRAMUSCULAR; INTRAVENOUS
OUTPATIENT
Start: 2025-05-14

## 2025-05-14 RX ORDER — ONDANSETRON 2 MG/ML
8 INJECTION INTRAMUSCULAR; INTRAVENOUS
OUTPATIENT
Start: 2025-05-14

## 2025-05-14 RX ORDER — DIPHENHYDRAMINE HYDROCHLORIDE 50 MG/ML
50 INJECTION, SOLUTION INTRAMUSCULAR; INTRAVENOUS
OUTPATIENT
Start: 2025-05-14

## 2025-05-27 ENCOUNTER — OFFICE VISIT (OUTPATIENT)
Dept: PAIN MANAGEMENT | Age: 80
End: 2025-05-27
Payer: MEDICARE

## 2025-05-27 VITALS
DIASTOLIC BLOOD PRESSURE: 71 MMHG | OXYGEN SATURATION: 90 % | BODY MASS INDEX: 24.69 KG/M2 | HEART RATE: 63 BPM | WEIGHT: 135 LBS | SYSTOLIC BLOOD PRESSURE: 132 MMHG

## 2025-05-27 DIAGNOSIS — M50.30 DEGENERATIVE DISC DISEASE, CERVICAL: ICD-10-CM

## 2025-05-27 DIAGNOSIS — G89.4 CHRONIC PAIN SYNDROME: ICD-10-CM

## 2025-05-27 DIAGNOSIS — M47.816 LUMBAR SPONDYLOSIS: ICD-10-CM

## 2025-05-27 DIAGNOSIS — M47.26 OSTEOARTHRITIS OF SPINE WITH RADICULOPATHY, LUMBAR REGION: ICD-10-CM

## 2025-05-27 DIAGNOSIS — M79.7 FIBROMYALGIA: ICD-10-CM

## 2025-05-27 DIAGNOSIS — M51.362 DEGENERATION OF INTERVERTEBRAL DISC OF LUMBAR REGION WITH DISCOGENIC BACK PAIN AND LOWER EXTREMITY PAIN: ICD-10-CM

## 2025-05-27 PROCEDURE — 1123F ACP DISCUSS/DSCN MKR DOCD: CPT | Performed by: INTERNAL MEDICINE

## 2025-05-27 PROCEDURE — 99213 OFFICE O/P EST LOW 20 MIN: CPT | Performed by: INTERNAL MEDICINE

## 2025-05-27 PROCEDURE — 1090F PRES/ABSN URINE INCON ASSESS: CPT | Performed by: INTERNAL MEDICINE

## 2025-05-27 PROCEDURE — G8399 PT W/DXA RESULTS DOCUMENT: HCPCS | Performed by: INTERNAL MEDICINE

## 2025-05-27 PROCEDURE — 1036F TOBACCO NON-USER: CPT | Performed by: INTERNAL MEDICINE

## 2025-05-27 PROCEDURE — G8420 CALC BMI NORM PARAMETERS: HCPCS | Performed by: INTERNAL MEDICINE

## 2025-05-27 PROCEDURE — G8427 DOCREV CUR MEDS BY ELIG CLIN: HCPCS | Performed by: INTERNAL MEDICINE

## 2025-05-27 RX ORDER — GABAPENTIN 600 MG/1
600 TABLET ORAL 2 TIMES DAILY
Qty: 60 TABLET | Refills: 1 | Status: SHIPPED | OUTPATIENT
Start: 2025-05-27 | End: 2025-07-26

## 2025-05-27 RX ORDER — PANTOPRAZOLE SODIUM 40 MG/1
40 TABLET, DELAYED RELEASE ORAL DAILY PRN
Qty: 30 TABLET | Refills: 1 | Status: SHIPPED | OUTPATIENT
Start: 2025-05-27

## 2025-05-27 RX ORDER — TRAZODONE HYDROCHLORIDE 50 MG/1
50-100 TABLET ORAL NIGHTLY
Qty: 60 TABLET | Refills: 1 | Status: SHIPPED | OUTPATIENT
Start: 2025-05-27

## 2025-05-27 RX ORDER — OXYCODONE AND ACETAMINOPHEN 7.5; 325 MG/1; MG/1
1 TABLET ORAL EVERY 6 HOURS PRN
Qty: 100 TABLET | Refills: 0 | Status: SHIPPED | OUTPATIENT
Start: 2025-05-27 | End: 2025-07-01

## 2025-05-27 NOTE — PROGRESS NOTES
Lalita Pimentel  1945  [id number]      HISTORY OF PRESENT ILLNESS:  Ms. Pimentel is a 79 y.o. female returns for a follow up visit for pain management  She has a diagnosis of   1. Chronic pain syndrome    2. Primary insomnia    3. Fibromyalgia    4. Degeneration of intervertebral disc of lumbar region with discogenic back pain and lower extremity pain    5. Osteoarthritis of spine with radiculopathy, lumbar region    6. Gastroesophageal reflux disease without esophagitis    7. Lumbar spondylosis    8. Degenerative disc disease, cervical    .      As per information/history obtained from the PADT(patient assessment and documentation tool) -  She complains of pain in the lower back with radiation to the buttocks and hips Right She rates the pain 8/10 and describes it as sharp.  Pain is made worse by: movement, standing, sitting, lifting. She denies any side effects from the current pain regimen. Patient reports that since last follow up visit the physical functioning is worse, family/social relationships are unchanged, mood is worse sleep patterns are unchanged. Ms. Pimentel states that since starting the treatment with the current regimen the  overall functioning  in the above aspects is  better, Patient denies misusing/abusing her narcotic pain medications or using any illegal drugs.  There are No indicators for possible drug abuse, addiction or diversion problems.   Upon obtaining the medical history from Ms. Pimentel regarding today's office visit for her presenting problems, patient states she is hurting bad, she states her symptoms have been worse. She complains of increased pain with changes in weather. Extreme temperatures- rain, cold and damp weather causes increased pain. Ms. Pimentel denies any side effects. She states she is managing her ADL's and house chores.       ALLERGIES: Patients list of allergies were reviewed     MEDICATIONS: Ms. Pimentel list of medications were reviewed.Her current medications are

## 2025-05-28 ENCOUNTER — HOSPITAL ENCOUNTER (OUTPATIENT)
Dept: INFUSION THERAPY | Age: 80
Setting detail: INFUSION SERIES
Discharge: HOME | End: 2025-05-28
Payer: MEDICARE

## 2025-05-28 VITALS
OXYGEN SATURATION: 95 % | HEART RATE: 65 BPM | TEMPERATURE: 98.4 F | RESPIRATION RATE: 18 BRPM | SYSTOLIC BLOOD PRESSURE: 108 MMHG | DIASTOLIC BLOOD PRESSURE: 76 MMHG

## 2025-05-28 DIAGNOSIS — M81.0 SENILE OSTEOPOROSIS: Primary | ICD-10-CM

## 2025-05-28 PROCEDURE — 6360000002 HC RX W HCPCS: Performed by: NURSE PRACTITIONER

## 2025-05-28 PROCEDURE — 96372 THER/PROPH/DIAG INJ SC/IM: CPT

## 2025-05-28 RX ORDER — SODIUM CHLORIDE 9 MG/ML
INJECTION, SOLUTION INTRAVENOUS CONTINUOUS
Status: CANCELLED | OUTPATIENT
Start: 2025-11-26

## 2025-05-28 RX ORDER — ACETAMINOPHEN 325 MG/1
650 TABLET ORAL
Status: CANCELLED | OUTPATIENT
Start: 2025-11-26

## 2025-05-28 RX ORDER — DIPHENHYDRAMINE HYDROCHLORIDE 50 MG/ML
50 INJECTION, SOLUTION INTRAMUSCULAR; INTRAVENOUS
Status: CANCELLED | OUTPATIENT
Start: 2025-11-26

## 2025-05-28 RX ORDER — ALBUTEROL SULFATE 90 UG/1
4 INHALANT RESPIRATORY (INHALATION) PRN
Status: CANCELLED | OUTPATIENT
Start: 2025-11-26

## 2025-05-28 RX ORDER — ONDANSETRON 2 MG/ML
8 INJECTION INTRAMUSCULAR; INTRAVENOUS
Status: CANCELLED | OUTPATIENT
Start: 2025-11-26

## 2025-05-28 RX ORDER — HYDROCORTISONE SODIUM SUCCINATE 100 MG/2ML
100 INJECTION INTRAMUSCULAR; INTRAVENOUS
Status: CANCELLED | OUTPATIENT
Start: 2025-11-26

## 2025-05-28 RX ORDER — EPINEPHRINE 1 MG/ML
0.3 INJECTION, SOLUTION, CONCENTRATE INTRAVENOUS PRN
Status: CANCELLED | OUTPATIENT
Start: 2025-11-26

## 2025-05-28 RX ADMIN — DENOSUMAB 60 MG: 60 INJECTION SUBCUTANEOUS at 12:54

## 2025-05-28 NOTE — PROGRESS NOTES
Outpatient Infusion Center  [facility]     Prolia Visit    NAME:  Lalita Pimentel  YOB: 1945  MEDICAL RECORD NUMBER:  [id number]  Episode Date:  5/28/2025    Patient arrived to Outpatient Infusion Center   [] per wheelchair   [x] ambulatory     Is this the patient's first Prolia Injection? No     Date of last Prolia Injection ? November 19, 2024.     Did the patient experience any adverse reactions to Prolia Injection? No    Any recent oral or dental surgery? No    Any recent active fever, infections and/or illnesses? No    Patient has history of pathological fracture? No    Patient has had a recent fracture due to trauma or injury? No    Patient has had a recent orthopedic surgery or procedure done? No    Approximate date of last Dexa scan? 12/8/23       /76   Pulse 65   Temp 98.4 °F (36.9 °C)   Resp 18   SpO2 95%     Current Lab Data:    Calcium:    Lab Results   Component Value Date/Time    CALCIUM 9.3 04/29/2025 11:31 AM       Patient Currently taking Calcium Supplements? Yes      Prolia administered: Yes    Prolia dosage: 60 mg was administered slowly subcutaneously into the right upper arm.      Response to treatment:  Well tolerated by patient.     Due for next dose of Prolia after November 28, 2025.     Electronically signed by Tiffany Matias RN on 5/28/2025 at 12:55 PM

## 2025-05-28 NOTE — DISCHARGE INSTRUCTIONS
Outpatient Infusion Discharge Instructions  [facility]  [address] [place], [place] [address]  Telephone: [phone]      FAX [phone]    NAME:  Lalita Pimentel  YOB: 1945  MEDICAL RECORD NUMBER:  [id number]  DATE:  @ED@    Reason for Outpatient Infusion Visit: prolia    If you develop any these symptoms please contact you Doctor    [x] Nausea and/or vomiting not relieved with medication   [x] Swelling, redness, and/or bleeding at injection or IV site    [x] Fever or chills  [x] Rash or itching   [x] Shortness of breath  [] Please review After Visit Summary (AVS) information on    [] Other      Outpatient Infusion Center Information: Should you experience any significant changes in your health or have questions about your care please contact the [facility] at [phone] MONDAY - FRIDAY 8:00 am - 4:00 pm.  If you need help outside these hours and cannot wait until we are again available, contact your Primary Care Physician or go to the hospital emergency room.       Electronically signed by Tiffany Matias RN on 5/28/2025 at 12:57 PM

## 2025-06-05 ENCOUNTER — OFFICE VISIT (OUTPATIENT)
Dept: FAMILY MEDICINE CLINIC | Age: 80
End: 2025-06-05
Payer: MEDICARE

## 2025-06-05 VITALS
TEMPERATURE: 98.1 F | HEIGHT: 62 IN | RESPIRATION RATE: 20 BRPM | BODY MASS INDEX: 24.37 KG/M2 | DIASTOLIC BLOOD PRESSURE: 64 MMHG | WEIGHT: 132.4 LBS | SYSTOLIC BLOOD PRESSURE: 114 MMHG | OXYGEN SATURATION: 98 % | HEART RATE: 78 BPM

## 2025-06-05 DIAGNOSIS — M81.0 AGE-RELATED OSTEOPOROSIS WITHOUT CURRENT PATHOLOGICAL FRACTURE: ICD-10-CM

## 2025-06-05 DIAGNOSIS — J44.9 COPD, SEVERE (HCC): ICD-10-CM

## 2025-06-05 DIAGNOSIS — K21.9 GASTROESOPHAGEAL REFLUX DISEASE WITHOUT ESOPHAGITIS: ICD-10-CM

## 2025-06-05 DIAGNOSIS — F33.1 MODERATE EPISODE OF RECURRENT MAJOR DEPRESSIVE DISORDER (HCC): ICD-10-CM

## 2025-06-05 DIAGNOSIS — E78.00 HYPERCHOLESTEREMIA: ICD-10-CM

## 2025-06-05 DIAGNOSIS — N18.31 TYPE 2 DIABETES MELLITUS WITH STAGE 3A CHRONIC KIDNEY DISEASE, WITHOUT LONG-TERM CURRENT USE OF INSULIN (HCC): ICD-10-CM

## 2025-06-05 DIAGNOSIS — E11.22 TYPE 2 DIABETES MELLITUS WITH STAGE 3A CHRONIC KIDNEY DISEASE, WITHOUT LONG-TERM CURRENT USE OF INSULIN (HCC): ICD-10-CM

## 2025-06-05 PROBLEM — J96.12 CHRONIC RESPIRATORY FAILURE WITH HYPERCAPNIA (HCC): Status: RESOLVED | Noted: 2017-01-10 | Resolved: 2025-06-05

## 2025-06-05 LAB — HBA1C MFR BLD: 5.9 %

## 2025-06-05 PROCEDURE — 3044F HG A1C LEVEL LT 7.0%: CPT | Performed by: NURSE PRACTITIONER

## 2025-06-05 PROCEDURE — 83036 HEMOGLOBIN GLYCOSYLATED A1C: CPT | Performed by: NURSE PRACTITIONER

## 2025-06-05 PROCEDURE — 1090F PRES/ABSN URINE INCON ASSESS: CPT | Performed by: NURSE PRACTITIONER

## 2025-06-05 PROCEDURE — G2211 COMPLEX E/M VISIT ADD ON: HCPCS | Performed by: NURSE PRACTITIONER

## 2025-06-05 PROCEDURE — 3023F SPIROM DOC REV: CPT | Performed by: NURSE PRACTITIONER

## 2025-06-05 PROCEDURE — 1160F RVW MEDS BY RX/DR IN RCRD: CPT | Performed by: NURSE PRACTITIONER

## 2025-06-05 PROCEDURE — G8420 CALC BMI NORM PARAMETERS: HCPCS | Performed by: NURSE PRACTITIONER

## 2025-06-05 PROCEDURE — 4004F PT TOBACCO SCREEN RCVD TLK: CPT | Performed by: NURSE PRACTITIONER

## 2025-06-05 PROCEDURE — G8399 PT W/DXA RESULTS DOCUMENT: HCPCS | Performed by: NURSE PRACTITIONER

## 2025-06-05 PROCEDURE — 1159F MED LIST DOCD IN RCRD: CPT | Performed by: NURSE PRACTITIONER

## 2025-06-05 PROCEDURE — 99214 OFFICE O/P EST MOD 30 MIN: CPT | Performed by: NURSE PRACTITIONER

## 2025-06-05 PROCEDURE — 1123F ACP DISCUSS/DSCN MKR DOCD: CPT | Performed by: NURSE PRACTITIONER

## 2025-06-05 PROCEDURE — G8427 DOCREV CUR MEDS BY ELIG CLIN: HCPCS | Performed by: NURSE PRACTITIONER

## 2025-06-05 SDOH — ECONOMIC STABILITY: FOOD INSECURITY: WITHIN THE PAST 12 MONTHS, THE FOOD YOU BOUGHT JUST DIDN'T LAST AND YOU DIDN'T HAVE MONEY TO GET MORE.: NEVER TRUE

## 2025-06-05 SDOH — ECONOMIC STABILITY: FOOD INSECURITY: WITHIN THE PAST 12 MONTHS, YOU WORRIED THAT YOUR FOOD WOULD RUN OUT BEFORE YOU GOT MONEY TO BUY MORE.: NEVER TRUE

## 2025-06-05 ASSESSMENT — ENCOUNTER SYMPTOMS
EYE ITCHING: 0
WHEEZING: 1
COUGH: 1
SHORTNESS OF BREATH: 1
SINUS PAIN: 0
CHEST TIGHTNESS: 0
ABDOMINAL PAIN: 0

## 2025-06-05 ASSESSMENT — PATIENT HEALTH QUESTIONNAIRE - PHQ9
SUM OF ALL RESPONSES TO PHQ QUESTIONS 1-9: 3
9. THOUGHTS THAT YOU WOULD BE BETTER OFF DEAD, OR OF HURTING YOURSELF: NOT AT ALL
4. FEELING TIRED OR HAVING LITTLE ENERGY: SEVERAL DAYS
SUM OF ALL RESPONSES TO PHQ QUESTIONS 1-9: 3
2. FEELING DOWN, DEPRESSED OR HOPELESS: SEVERAL DAYS
1. LITTLE INTEREST OR PLEASURE IN DOING THINGS: NOT AT ALL
5. POOR APPETITE OR OVEREATING: NOT AT ALL
7. TROUBLE CONCENTRATING ON THINGS, SUCH AS READING THE NEWSPAPER OR WATCHING TELEVISION: SEVERAL DAYS
SUM OF ALL RESPONSES TO PHQ QUESTIONS 1-9: 3
3. TROUBLE FALLING OR STAYING ASLEEP: NOT AT ALL
8. MOVING OR SPEAKING SO SLOWLY THAT OTHER PEOPLE COULD HAVE NOTICED. OR THE OPPOSITE, BEING SO FIGETY OR RESTLESS THAT YOU HAVE BEEN MOVING AROUND A LOT MORE THAN USUAL: NOT AT ALL
6. FEELING BAD ABOUT YOURSELF - OR THAT YOU ARE A FAILURE OR HAVE LET YOURSELF OR YOUR FAMILY DOWN: NOT AT ALL
SUM OF ALL RESPONSES TO PHQ QUESTIONS 1-9: 3
10. IF YOU CHECKED OFF ANY PROBLEMS, HOW DIFFICULT HAVE THESE PROBLEMS MADE IT FOR YOU TO DO YOUR WORK, TAKE CARE OF THINGS AT HOME, OR GET ALONG WITH OTHER PEOPLE: NOT DIFFICULT AT ALL

## 2025-06-05 NOTE — PROGRESS NOTES
by mouth 2 times daily for 60 days. 60 tablet 1    oxyCODONE-acetaminophen (PERCOCET) 7.5-325 MG per tablet Take 1 tablet by mouth every 6 hours as needed for Pain (max 3-4) for up to 35 days. 100 tablet 0    pantoprazole (PROTONIX) 40 MG tablet Take 1 tablet by mouth daily as needed (GERD symptoms) 30 tablet 1    traZODone (DESYREL) 50 MG tablet Take 1-2 tablets by mouth nightly 60 tablet 1    DULoxetine (CYMBALTA) 30 MG extended release capsule TAKE 1 CAPSULE BY MOUTH IN THE EVENING 90 capsule 0    DULoxetine (CYMBALTA) 60 MG extended release capsule TAKE 1 CAPSULE BY MOUTH EVERY DAY 90 capsule 1    atorvastatin (LIPITOR) 80 MG tablet TAKE 1 TABLET BY MOUTH EVERY DAY FOR CHOLESTEROL 90 tablet 1    naloxone (NARCAN) 4 MG/0.1ML LIQD nasal spray 1 spray by Nasal route as needed for Opioid Reversal 1 each 0    fluticasone furoate-vilanterol (BREO ELLIPTA) 200-25 MCG/ACT AEPB inhaler Inhale 1 puff into the lungs daily 1 each 11    albuterol sulfate HFA (PROAIR HFA) 108 (90 Base) MCG/ACT inhaler Inhale 2 puffs into the lungs every 6 hours as needed for Wheezing or Shortness of Breath 18 g 11    ipratropium-albuterol (DUONEB) 0.5-2.5 (3) MG/3ML SOLN nebulizer solution Take 1 aerosol every 4-6 hours as needed for shortness of breath coughing and wheezing 360 mL 0    aspirin 81 MG EC tablet Take 1 tablet by mouth in the morning. 30 tablet 3    albuterol sulfate  (90 Base) MCG/ACT inhaler Inhale 2 puffs into the lungs every 6 hours as needed for Shortness of Breath 1 each 11    Calcium Citrate-Vitamin D 500-500 MG-UNIT CHEW Take 1 tablet by mouth 2 times daily      ONE TOUCH LANCETS MISC 1 each by Does not apply route daily 100 each 3    Cholecalciferol (VITAMIN D) 2000 units TABS tablet Take 1 tablet by mouth daily 30 tablet     cetirizine (ZYRTEC) 10 MG tablet Take 1 tablet by mouth daily as needed for Allergies      Nebulizers (COMPRESSOR/NEBULIZER) MISC 1 Units by Does not apply route 4 times daily 1 each 3

## 2025-06-24 ENCOUNTER — OFFICE VISIT (OUTPATIENT)
Dept: PAIN MANAGEMENT | Age: 80
End: 2025-06-24
Payer: MEDICARE

## 2025-06-24 VITALS
WEIGHT: 133 LBS | OXYGEN SATURATION: 93 % | SYSTOLIC BLOOD PRESSURE: 119 MMHG | HEART RATE: 67 BPM | BODY MASS INDEX: 24.32 KG/M2 | DIASTOLIC BLOOD PRESSURE: 75 MMHG

## 2025-06-24 DIAGNOSIS — M47.26 OSTEOARTHRITIS OF SPINE WITH RADICULOPATHY, LUMBAR REGION: ICD-10-CM

## 2025-06-24 DIAGNOSIS — M51.362 DEGENERATION OF INTERVERTEBRAL DISC OF LUMBAR REGION WITH DISCOGENIC BACK PAIN AND LOWER EXTREMITY PAIN: ICD-10-CM

## 2025-06-24 DIAGNOSIS — M50.30 DEGENERATIVE DISC DISEASE, CERVICAL: ICD-10-CM

## 2025-06-24 DIAGNOSIS — G89.4 CHRONIC PAIN SYNDROME: ICD-10-CM

## 2025-06-24 DIAGNOSIS — Z51.81 ENCOUNTER FOR THERAPEUTIC DRUG MONITORING: ICD-10-CM

## 2025-06-24 DIAGNOSIS — M79.7 FIBROMYALGIA: ICD-10-CM

## 2025-06-24 DIAGNOSIS — M47.816 LUMBAR SPONDYLOSIS: ICD-10-CM

## 2025-06-24 PROCEDURE — G8420 CALC BMI NORM PARAMETERS: HCPCS | Performed by: INTERNAL MEDICINE

## 2025-06-24 PROCEDURE — G8427 DOCREV CUR MEDS BY ELIG CLIN: HCPCS | Performed by: INTERNAL MEDICINE

## 2025-06-24 PROCEDURE — 4004F PT TOBACCO SCREEN RCVD TLK: CPT | Performed by: INTERNAL MEDICINE

## 2025-06-24 PROCEDURE — 1159F MED LIST DOCD IN RCRD: CPT | Performed by: INTERNAL MEDICINE

## 2025-06-24 PROCEDURE — 1090F PRES/ABSN URINE INCON ASSESS: CPT | Performed by: INTERNAL MEDICINE

## 2025-06-24 PROCEDURE — 1123F ACP DISCUSS/DSCN MKR DOCD: CPT | Performed by: INTERNAL MEDICINE

## 2025-06-24 PROCEDURE — G8399 PT W/DXA RESULTS DOCUMENT: HCPCS | Performed by: INTERNAL MEDICINE

## 2025-06-24 PROCEDURE — 99213 OFFICE O/P EST LOW 20 MIN: CPT | Performed by: INTERNAL MEDICINE

## 2025-06-24 RX ORDER — TRAZODONE HYDROCHLORIDE 50 MG/1
50-100 TABLET ORAL NIGHTLY
Qty: 60 TABLET | Refills: 1 | Status: SHIPPED | OUTPATIENT
Start: 2025-06-24

## 2025-06-24 RX ORDER — GABAPENTIN 600 MG/1
600 TABLET ORAL 2 TIMES DAILY
Qty: 60 TABLET | Refills: 1 | Status: SHIPPED | OUTPATIENT
Start: 2025-06-24 | End: 2025-08-23

## 2025-06-24 RX ORDER — OXYCODONE AND ACETAMINOPHEN 7.5; 325 MG/1; MG/1
1 TABLET ORAL EVERY 6 HOURS PRN
Qty: 100 TABLET | Refills: 0 | Status: SHIPPED | OUTPATIENT
Start: 2025-06-24 | End: 2025-07-22

## 2025-06-24 RX ORDER — PANTOPRAZOLE SODIUM 40 MG/1
40 TABLET, DELAYED RELEASE ORAL DAILY PRN
Qty: 30 TABLET | Refills: 1 | Status: SHIPPED | OUTPATIENT
Start: 2025-06-24

## 2025-07-11 DIAGNOSIS — E78.00 HYPERCHOLESTEREMIA: ICD-10-CM

## 2025-07-11 RX ORDER — ATORVASTATIN CALCIUM 80 MG/1
TABLET, FILM COATED ORAL
Qty: 90 TABLET | Refills: 1 | Status: ON HOLD | OUTPATIENT
Start: 2025-07-11

## 2025-07-12 ENCOUNTER — APPOINTMENT (OUTPATIENT)
Dept: GENERAL RADIOLOGY | Age: 80
DRG: 190 | End: 2025-07-12
Payer: MEDICARE

## 2025-07-12 ENCOUNTER — HOSPITAL ENCOUNTER (INPATIENT)
Age: 80
LOS: 3 days | Discharge: HOME | DRG: 190 | End: 2025-07-15
Attending: EMERGENCY MEDICINE | Admitting: HOSPITALIST
Payer: MEDICARE

## 2025-07-12 DIAGNOSIS — E87.29 RESPIRATORY ACIDOSIS: ICD-10-CM

## 2025-07-12 DIAGNOSIS — J44.1 COPD WITH ACUTE EXACERBATION (HCC): Primary | ICD-10-CM

## 2025-07-12 DIAGNOSIS — J96.01 ACUTE RESPIRATORY FAILURE WITH HYPOXIA (HCC): ICD-10-CM

## 2025-07-12 LAB
ALBUMIN SERPL-MCNC: 3.5 G/DL (ref 3.4–5)
ALBUMIN/GLOB SERPL: 1.5 {RATIO} (ref 1.1–2.2)
ALP SERPL-CCNC: 58 U/L (ref 40–129)
ALT SERPL-CCNC: 16 U/L (ref 10–40)
ANION GAP SERPL CALCULATED.3IONS-SCNC: 9 MMOL/L (ref 3–16)
AST SERPL-CCNC: 29 U/L (ref 15–37)
BASE EXCESS BLDV CALC-SCNC: 2.6 MMOL/L
BASOPHILS # BLD: 0 K/UL (ref 0–0.2)
BASOPHILS NFR BLD: 0.4 %
BILIRUB SERPL-MCNC: 0.3 MG/DL (ref 0–1)
BUN SERPL-MCNC: 11 MG/DL (ref 7–20)
CALCIUM SERPL-MCNC: 8.3 MG/DL (ref 8.3–10.6)
CHLORIDE SERPL-SCNC: 102 MMOL/L (ref 99–110)
CO2 BLDV-SCNC: 33 MMOL/L
CO2 SERPL-SCNC: 28 MMOL/L (ref 21–32)
COHGB MFR BLDV: 2.3 %
CREAT SERPL-MCNC: 1 MG/DL (ref 0.6–1.2)
DEPRECATED RDW RBC AUTO: 16.5 % (ref 12.4–15.4)
EOSINOPHIL # BLD: 0.1 K/UL (ref 0–0.6)
EOSINOPHIL NFR BLD: 2 %
GFR SERPLBLD CREATININE-BSD FMLA CKD-EPI: 58 ML/MIN/{1.73_M2}
GLUCOSE SERPL-MCNC: 110 MG/DL (ref 70–99)
HCO3 BLDV-SCNC: 31 MMOL/L (ref 23–29)
HCT VFR BLD AUTO: 40.6 % (ref 36–48)
HGB BLD-MCNC: 13.3 G/DL (ref 12–16)
LYMPHOCYTES # BLD: 0.5 K/UL (ref 1–5.1)
LYMPHOCYTES NFR BLD: 21.4 %
MAGNESIUM SERPL-MCNC: 2.3 MG/DL (ref 1.8–2.4)
MCH RBC QN AUTO: 30.7 PG (ref 26–34)
MCHC RBC AUTO-ENTMCNC: 32.8 G/DL (ref 31–36)
MCV RBC AUTO: 93.6 FL (ref 80–100)
METHGB MFR BLDV: 0.3 %
MONOCYTES # BLD: 0.4 K/UL (ref 0–1.3)
MONOCYTES NFR BLD: 17.5 %
NEUTROPHILS # BLD: 1.5 K/UL (ref 1.7–7.7)
NEUTROPHILS NFR BLD: 58.7 %
NT-PROBNP SERPL-MCNC: 235 PG/ML (ref 0–449)
O2 THERAPY: ABNORMAL
PCO2 BLDV: 61.8 MMHG (ref 40–50)
PH BLDV: 7.3 [PH] (ref 7.35–7.45)
PLATELET # BLD AUTO: 177 K/UL (ref 135–450)
PMV BLD AUTO: 7.6 FL (ref 5–10.5)
PO2 BLDV: 31 MMHG
POTASSIUM SERPL-SCNC: 4.1 MMOL/L (ref 3.5–5.1)
PROT SERPL-MCNC: 5.8 G/DL (ref 6.4–8.2)
RBC # BLD AUTO: 4.34 M/UL (ref 4–5.2)
SAO2 % BLDV: 52 %
SODIUM SERPL-SCNC: 139 MMOL/L (ref 136–145)
TROPONIN, HIGH SENSITIVITY: 10 NG/L (ref 0–14)
TROPONIN, HIGH SENSITIVITY: 7 NG/L (ref 0–14)
WBC # BLD AUTO: 2.5 K/UL (ref 4–11)

## 2025-07-12 PROCEDURE — 99285 EMERGENCY DEPT VISIT HI MDM: CPT

## 2025-07-12 PROCEDURE — 84484 ASSAY OF TROPONIN QUANT: CPT

## 2025-07-12 PROCEDURE — 94761 N-INVAS EAR/PLS OXIMETRY MLT: CPT

## 2025-07-12 PROCEDURE — 2700000000 HC OXYGEN THERAPY PER DAY

## 2025-07-12 PROCEDURE — 2580000003 HC RX 258: Performed by: HOSPITALIST

## 2025-07-12 PROCEDURE — 83880 ASSAY OF NATRIURETIC PEPTIDE: CPT

## 2025-07-12 PROCEDURE — 80053 COMPREHEN METABOLIC PANEL: CPT

## 2025-07-12 PROCEDURE — 82803 BLOOD GASES ANY COMBINATION: CPT

## 2025-07-12 PROCEDURE — 94640 AIRWAY INHALATION TREATMENT: CPT

## 2025-07-12 PROCEDURE — 6370000000 HC RX 637 (ALT 250 FOR IP): Performed by: HOSPITALIST

## 2025-07-12 PROCEDURE — 83735 ASSAY OF MAGNESIUM: CPT

## 2025-07-12 PROCEDURE — 93005 ELECTROCARDIOGRAM TRACING: CPT | Performed by: EMERGENCY MEDICINE

## 2025-07-12 PROCEDURE — 96374 THER/PROPH/DIAG INJ IV PUSH: CPT

## 2025-07-12 PROCEDURE — 6360000002 HC RX W HCPCS: Performed by: HOSPITALIST

## 2025-07-12 PROCEDURE — 1200000000 HC SEMI PRIVATE

## 2025-07-12 PROCEDURE — 2500000003 HC RX 250 WO HCPCS

## 2025-07-12 PROCEDURE — 6360000002 HC RX W HCPCS

## 2025-07-12 PROCEDURE — 36415 COLL VENOUS BLD VENIPUNCTURE: CPT

## 2025-07-12 PROCEDURE — 6370000000 HC RX 637 (ALT 250 FOR IP)

## 2025-07-12 PROCEDURE — 85025 COMPLETE CBC W/AUTO DIFF WBC: CPT

## 2025-07-12 PROCEDURE — 71046 X-RAY EXAM CHEST 2 VIEWS: CPT

## 2025-07-12 RX ORDER — ACETAMINOPHEN 325 MG/1
650 TABLET ORAL EVERY 6 HOURS PRN
Status: DISCONTINUED | OUTPATIENT
Start: 2025-07-12 | End: 2025-07-15 | Stop reason: HOSPADM

## 2025-07-12 RX ORDER — ONDANSETRON 2 MG/ML
4 INJECTION INTRAMUSCULAR; INTRAVENOUS EVERY 6 HOURS PRN
Status: DISCONTINUED | OUTPATIENT
Start: 2025-07-12 | End: 2025-07-15 | Stop reason: HOSPADM

## 2025-07-12 RX ORDER — ONDANSETRON 4 MG/1
4 TABLET, ORALLY DISINTEGRATING ORAL EVERY 8 HOURS PRN
Status: DISCONTINUED | OUTPATIENT
Start: 2025-07-12 | End: 2025-07-15 | Stop reason: HOSPADM

## 2025-07-12 RX ORDER — SODIUM CHLORIDE 0.9 % (FLUSH) 0.9 %
5-40 SYRINGE (ML) INJECTION EVERY 12 HOURS SCHEDULED
Status: DISCONTINUED | OUTPATIENT
Start: 2025-07-12 | End: 2025-07-15 | Stop reason: HOSPADM

## 2025-07-12 RX ORDER — OXYCODONE AND ACETAMINOPHEN 7.5; 325 MG/1; MG/1
1 TABLET ORAL EVERY 6 HOURS PRN
Status: DISCONTINUED | OUTPATIENT
Start: 2025-07-12 | End: 2025-07-15 | Stop reason: HOSPADM

## 2025-07-12 RX ORDER — MAGNESIUM SULFATE IN WATER 40 MG/ML
2000 INJECTION, SOLUTION INTRAVENOUS PRN
Status: DISCONTINUED | OUTPATIENT
Start: 2025-07-12 | End: 2025-07-15 | Stop reason: HOSPADM

## 2025-07-12 RX ORDER — WATER 10 ML/10ML
INJECTION INTRAMUSCULAR; INTRAVENOUS; SUBCUTANEOUS
Status: COMPLETED
Start: 2025-07-12 | End: 2025-07-12

## 2025-07-12 RX ORDER — ASPIRIN 81 MG/1
81 TABLET ORAL DAILY
Status: DISCONTINUED | OUTPATIENT
Start: 2025-07-13 | End: 2025-07-15 | Stop reason: HOSPADM

## 2025-07-12 RX ORDER — GABAPENTIN 300 MG/1
600 CAPSULE ORAL 2 TIMES DAILY
Status: DISCONTINUED | OUTPATIENT
Start: 2025-07-12 | End: 2025-07-15 | Stop reason: HOSPADM

## 2025-07-12 RX ORDER — GUAIFENESIN/DEXTROMETHORPHAN 100-10MG/5
5 SYRUP ORAL EVERY 4 HOURS PRN
Status: DISCONTINUED | OUTPATIENT
Start: 2025-07-12 | End: 2025-07-15 | Stop reason: HOSPADM

## 2025-07-12 RX ORDER — SODIUM CHLORIDE 9 MG/ML
INJECTION, SOLUTION INTRAVENOUS PRN
Status: DISCONTINUED | OUTPATIENT
Start: 2025-07-12 | End: 2025-07-15 | Stop reason: HOSPADM

## 2025-07-12 RX ORDER — PANTOPRAZOLE SODIUM 40 MG/1
40 TABLET, DELAYED RELEASE ORAL DAILY PRN
Status: DISCONTINUED | OUTPATIENT
Start: 2025-07-12 | End: 2025-07-15 | Stop reason: HOSPADM

## 2025-07-12 RX ORDER — METHYLPREDNISOLONE SODIUM SUCCINATE 40 MG/ML
40 INJECTION INTRAMUSCULAR; INTRAVENOUS EVERY 12 HOURS
Status: DISCONTINUED | OUTPATIENT
Start: 2025-07-12 | End: 2025-07-15

## 2025-07-12 RX ORDER — IPRATROPIUM BROMIDE AND ALBUTEROL SULFATE 2.5; .5 MG/3ML; MG/3ML
1 SOLUTION RESPIRATORY (INHALATION)
Status: DISCONTINUED | OUTPATIENT
Start: 2025-07-12 | End: 2025-07-15 | Stop reason: HOSPADM

## 2025-07-12 RX ORDER — ENOXAPARIN SODIUM 100 MG/ML
40 INJECTION SUBCUTANEOUS DAILY
Status: DISCONTINUED | OUTPATIENT
Start: 2025-07-13 | End: 2025-07-15 | Stop reason: HOSPADM

## 2025-07-12 RX ORDER — DEXAMETHASONE SODIUM PHOSPHATE 4 MG/ML
4 INJECTION, SOLUTION INTRA-ARTICULAR; INTRALESIONAL; INTRAMUSCULAR; INTRAVENOUS; SOFT TISSUE ONCE
Status: COMPLETED | OUTPATIENT
Start: 2025-07-12 | End: 2025-07-12

## 2025-07-12 RX ORDER — ACETAMINOPHEN 650 MG/1
650 SUPPOSITORY RECTAL EVERY 6 HOURS PRN
Status: DISCONTINUED | OUTPATIENT
Start: 2025-07-12 | End: 2025-07-15 | Stop reason: HOSPADM

## 2025-07-12 RX ORDER — POLYETHYLENE GLYCOL 3350 17 G/17G
17 POWDER, FOR SOLUTION ORAL DAILY PRN
Status: DISCONTINUED | OUTPATIENT
Start: 2025-07-12 | End: 2025-07-15 | Stop reason: HOSPADM

## 2025-07-12 RX ORDER — ATORVASTATIN CALCIUM 80 MG/1
80 TABLET, FILM COATED ORAL NIGHTLY
Status: DISCONTINUED | OUTPATIENT
Start: 2025-07-12 | End: 2025-07-15 | Stop reason: HOSPADM

## 2025-07-12 RX ORDER — SODIUM CHLORIDE 0.9 % (FLUSH) 0.9 %
5-40 SYRINGE (ML) INJECTION PRN
Status: DISCONTINUED | OUTPATIENT
Start: 2025-07-12 | End: 2025-07-15 | Stop reason: HOSPADM

## 2025-07-12 RX ORDER — POTASSIUM CHLORIDE 7.45 MG/ML
10 INJECTION INTRAVENOUS PRN
Status: DISCONTINUED | OUTPATIENT
Start: 2025-07-12 | End: 2025-07-15 | Stop reason: HOSPADM

## 2025-07-12 RX ORDER — BUDESONIDE AND FORMOTEROL FUMARATE DIHYDRATE 80; 4.5 UG/1; UG/1
2 AEROSOL RESPIRATORY (INHALATION)
Status: DISCONTINUED | OUTPATIENT
Start: 2025-07-12 | End: 2025-07-13

## 2025-07-12 RX ORDER — IPRATROPIUM BROMIDE AND ALBUTEROL SULFATE 2.5; .5 MG/3ML; MG/3ML
2 SOLUTION RESPIRATORY (INHALATION) ONCE
Status: COMPLETED | OUTPATIENT
Start: 2025-07-12 | End: 2025-07-12

## 2025-07-12 RX ORDER — POTASSIUM CHLORIDE 1500 MG/1
40 TABLET, EXTENDED RELEASE ORAL PRN
Status: DISCONTINUED | OUTPATIENT
Start: 2025-07-12 | End: 2025-07-15 | Stop reason: HOSPADM

## 2025-07-12 RX ADMIN — IPRATROPIUM BROMIDE AND ALBUTEROL SULFATE 1 DOSE: .5; 2.5 SOLUTION RESPIRATORY (INHALATION) at 19:21

## 2025-07-12 RX ADMIN — DEXAMETHASONE SODIUM PHOSPHATE 4 MG: 4 INJECTION, SOLUTION INTRAMUSCULAR; INTRAVENOUS at 14:15

## 2025-07-12 RX ADMIN — BUDESONIDE AND FORMOTEROL FUMARATE DIHYDRATE 2 PUFF: 80; 4.5 AEROSOL RESPIRATORY (INHALATION) at 19:21

## 2025-07-12 RX ADMIN — ATORVASTATIN CALCIUM 80 MG: 80 TABLET, FILM COATED ORAL at 21:09

## 2025-07-12 RX ADMIN — IPRATROPIUM BROMIDE AND ALBUTEROL SULFATE 2 DOSE: .5; 2.5 SOLUTION RESPIRATORY (INHALATION) at 14:24

## 2025-07-12 RX ADMIN — METHYLPREDNISOLONE SODIUM SUCCINATE 40 MG: 40 INJECTION INTRAMUSCULAR; INTRAVENOUS at 17:27

## 2025-07-12 RX ADMIN — WATER 1 ML: 1 INJECTION INTRAMUSCULAR; INTRAVENOUS; SUBCUTANEOUS at 17:28

## 2025-07-12 RX ADMIN — DOXYCYCLINE 100 MG: 100 INJECTION, POWDER, LYOPHILIZED, FOR SOLUTION INTRAVENOUS at 17:29

## 2025-07-12 RX ADMIN — GABAPENTIN 600 MG: 300 CAPSULE ORAL at 21:09

## 2025-07-12 ASSESSMENT — PAIN SCALES - GENERAL
PAINLEVEL_OUTOF10: 0

## 2025-07-12 ASSESSMENT — PAIN - FUNCTIONAL ASSESSMENT: PAIN_FUNCTIONAL_ASSESSMENT: 0-10

## 2025-07-12 NOTE — ED NOTES
Transportation here to transport Pt to [address] [place] [address] via stretcher. Patient stable for transport.

## 2025-07-12 NOTE — H&P
V2.0  History and Physical      Name:  Lalita Pimentel /Age/Sex: 1945  (79 y.o. female)   MRN & CSN:  [id number] & [id number] Encounter Date/Time: 2025 2:55 PM EDT   Location:  [address] [place] [address] PCP: Belkis Silva APRN - CNP       Hospital Day: 1    Assessment and Plan:   Lalita Pimentel is a 79 y.o. female with a pmh of  who presents with COPD exacerbation (HCC)    Hospital Problems           Last Modified POA    * (Principal) COPD exacerbation (HCC) 2025 Yes       Plan:          Admit Inpatient  Telemetry monitering  Oxygen  Duonebs   Steroids  IV antibiotics  Moniter O2 sat    Continue home meds for chronic conditions        DVT ppx  : lovenox    Diet : regular diet    Code status  : full code    Advanced care planning was discussed with patient for a total of 16 minutes.  Full code, DNR CC, DNR CCA, power of  and living will were discussed.  Patient elected to be a full code.           Disposition:   Current Living situation: Home  Expected Disposition:Home  Estimated D/C: 2-3 days    Diet No diet orders on file   DVT Prophylaxis [] Lovenox, []  Heparin, [] SCDs, [] Ambulation,  [] Eliquis, [] Xarelto, [] Coumadin   Code Status Prior   Surrogate Decision Maker/ POA          Personally reviewed Lab Studies and Imaging      Discussed management of the case with ED  who recommended Hospital admission     EKG interpreted personally and results : no STEMI     Imaging that was interpreted personally includes  CXR  and results : no PNA     Drugs that require monitoring for toxicity include  IV solumedrol ,  IV Antibiotics  and the method of monitoring was Vitals monitering, BMP and CBC monitering , Telemetry monitering      History from:       patient      History of Present Illness:     Chief Complaint:   Lalita Pimentel is a 79 y.o. female with pmh of COPD , Active smoker who presents with     C/o SOB    C/o cough    C/o fatigue and generalized weakness since last 3 days       Denies

## 2025-07-12 NOTE — ED NOTES
ED TO INPATIENT SBAR HANDOFF    Patient Name: Lalita Pimentel   Preferred Name: Lalita  : 1945  79 y.o.   Family/Caregiver Present: no   Code Status Order: Prior  PO Status: NPO:No  Telemetry Order: Yes  C-SSRS: Risk of Suicide: No Risk  Sitter no   Restraints:     Sepsis Risk Score Sepsis V2 Risk Score: 5.4    Situation  Chief Complaint   Patient presents with    Fatigue     Pt presents to ED from home via [facility] for c/o generalized weakness x 3 days and SOB since yesterday evening. PMHx history COPD/emphysema. SpO2 70% on R/A upon EMS arrival; 94% on O2 @ 4 LPM via NC per EMS. Duoneb HHN x 1 in route to ED. Denies chest pain, headaches, vision changes, abd pain, N/V. SpO2 88-89% on R/A in triage. 94-96% on 2 LPM via NC. VSS and Pt A&Ox4.     Brief Description of Patient's Condition:  Mental Status: oriented, alert, coherent, logical, thought processes intact, and able to concentrate and follow conversation  Arrived from:Home  Imaging:   XR CHEST (2 VW)   Final Result   1. No acute cardiopulmonary process.   2. Scarring in the right upper lobe.           Abnormal labs:   Abnormal Labs Reviewed   CBC WITH AUTO DIFFERENTIAL - Abnormal; Notable for the following components:       Result Value    WBC 2.5 (*)     RDW 16.5 (*)     Neutrophils Absolute 1.5 (*)     Lymphocytes Absolute 0.5 (*)     All other components within normal limits   COMPREHENSIVE METABOLIC PANEL - Abnormal; Notable for the following components:    Glucose 110 (*)     Est, Glom Filt Rate 58 (*)     Total Protein 5.8 (*)     All other components within normal limits   BLOOD GAS, VENOUS - Abnormal; Notable for the following components:    pH, Daniel 7.304 (*)     pCO2, Daniel 61.8 (*)     HCO3, Venous 31 (*)     All other components within normal limits       Background  Allergies: No Known Allergies  History:   Past Medical History:   Diagnosis Date    CAD (coronary artery disease) 2022    NSTEMI, PCI LCx    Chronic pain syndrome

## 2025-07-12 NOTE — ED TRIAGE NOTES
Pt presents to ED from home via [facility] for c/o generalized weakness x 3 days and SOB since yesterday evening. PMHx history COPD/emphysema. SpO2 70% on R/A upon EMS arrival; 94% on O2 @ 4 LPM via NC per EMS. Duoneb HHN x 1 in route to ED. Denies chest pain, headaches, vision changes, abd pain, N/V. SpO2 88-89% on R/A in triage. 94-96% on 2 LPM via NC. VSS and Pt A&Ox4.

## 2025-07-12 NOTE — ED PROVIDER NOTES
[facility] EMERGENCY DEPARTMENT  EMERGENCY DEPARTMENT ENCOUNTER        Pt Name: Lalita Pimentel  MRN: [id number]  Birthdate 1945  Date of evaluation: 7/12/2025  Provider: Cecilia Briseno PA-C  PCP: Belkis Silva APRN - CNP  Note Started: 1:03 PM EDT 7/12/25       I have seen and evaluated this patient with my supervising physician Naeem Mayer MD.      CHIEF COMPLAINT       Chief Complaint   Patient presents with    Fatigue     Pt presents to ED from home via [facility] for c/o generalized weakness x 3 days and SOB since yesterday evening. PMHx history COPD/emphysema. SpO2 70% on R/A upon EMS arrival; 94% on O2 @ 4 LPM via NC per EMS. Duoneb HHN x 1 in route to ED. Denies chest pain, headaches, vision changes, abd pain, N/V. SpO2 88-89% on R/A in triage. 94-96% on 2 LPM via NC. VSS and Pt A&Ox4.       HISTORY OF PRESENT ILLNESS: 1 or more Elements     History From: Patient      Lalita Pimentel is a 79 y.o. female who presents to the ED via EMS for increased generalized weakness for the last 3 days and worsening dyspnea on exertion since yesterday.  She reports that she also has a productive cough with clear sputum, nasal congestion, and rhinorrhea since yesterday.  She has a history of COPD.  She does not wear oxygen at home.  She reports that she does not use her at home inhalers very often.  Per EMS, SpO2 was 70% on room air.  Upon arrival here in the ED, SpO2 is 88 to 89% on room air.  She was placed on 2 L nasal cannula and is now satting about 96%.  She denies any chest pain.  Denies recent fevers or chills.  She does tell me that she fell x 2 days ago, denies hitting head or LOC.  Denies recent travel, surgeries, injuries.  Denies previous history of blood clots.    Nursing Notes were all reviewed and agreed with or any disagreements were addressed in the HPI.    REVIEW OF SYSTEMS :      Review of Systems    Positives and Pertinent negatives as per HPI.     SURGICAL

## 2025-07-12 NOTE — ED PROVIDER NOTES
Emergency Department Attending Provider Note  Location: [facility] EMERGENCY DEPARTMENT  7/12/2025   Note Started: 2:07 PM EDT 7/12/25     THIS IS MY KANDICE SUPERVISORY AND SHARED VISIT NOTE:    Patient Identification  Lalita Pimentel is a 79 y.o. female who  has a past medical history of CAD (coronary artery disease), Chronic pain syndrome, Chronic respiratory failure with hypercapnia (HCC), Colorectal polyps, COPD (chronic obstructive pulmonary disease) (HCC), CTS (carpal tunnel syndrome), DDD (degenerative disc disease), lumbar, Depression, Family hx of colon cancer, Fibromyalgia, Hyperlipemia, IBS (irritable bowel syndrome), Lumbar spondylosis, Mixed restrictive and obstructive lung disease (HCC), New onset type 2 diabetes mellitus (HCC), On home O2, Pneumonia, and Urticaria, chronic.      HPI:Lalita Pimentel was evaluated in the Emergency Department for shortness of breath and general fatigue has been present over the past 3 days.  Patient has a history of COPD but does not normally wear oxygen at baseline.  EMS states that the patient was 70% on room air when they arrived.  She was placed on 4 L nasal cannula and O2 saturation came up into the mid 90s.  She was also treated with a DuoNeb aerosol and route to the hospital.  No steroids were given by EMS.  Patient states she has had a cough but denies any sputum production.  She denies any chest pain.  States she does feel short of breath especially with exertion.  Denies any fevers.  No abdominal pain.  Normal urinary and bowel habits.  No lower extremity pain or swelling.  Denies any known sick contacts.  No recent travel.   Although initial history and physical exam information was obtained by KANDICE/NPP (who also dictated a record of this visit), I personally saw the patient and made/approved the management plan and take responsibility for the patient management.       PHYSICAL EXAM:     CONSTITUTIONAL: AOx4, cooperative with exam, afebrile, chronically

## 2025-07-13 PROBLEM — J96.01 ACUTE RESPIRATORY FAILURE WITH HYPOXIA (HCC): Status: ACTIVE | Noted: 2025-07-13

## 2025-07-13 LAB
ANION GAP SERPL CALCULATED.3IONS-SCNC: 11 MMOL/L (ref 3–16)
BASOPHILS # BLD: 0 K/UL (ref 0–0.2)
BASOPHILS NFR BLD: 0.3 %
BUN SERPL-MCNC: 12 MG/DL (ref 7–20)
CALCIUM SERPL-MCNC: 8.7 MG/DL (ref 8.3–10.6)
CHLORIDE SERPL-SCNC: 105 MMOL/L (ref 99–110)
CO2 SERPL-SCNC: 25 MMOL/L (ref 21–32)
CREAT SERPL-MCNC: 0.7 MG/DL (ref 0.6–1.2)
DEPRECATED RDW RBC AUTO: 16.1 % (ref 12.4–15.4)
EKG ATRIAL RATE: 64 BPM
EKG DIAGNOSIS: NORMAL
EKG P AXIS: 63 DEGREES
EKG P-R INTERVAL: 150 MS
EKG Q-T INTERVAL: 428 MS
EKG QRS DURATION: 74 MS
EKG QTC CALCULATION (BAZETT): 441 MS
EKG R AXIS: 58 DEGREES
EKG T AXIS: 71 DEGREES
EKG VENTRICULAR RATE: 64 BPM
EOSINOPHIL # BLD: 0 K/UL (ref 0–0.6)
EOSINOPHIL NFR BLD: 0 %
GFR SERPLBLD CREATININE-BSD FMLA CKD-EPI: 89 ML/MIN/{1.73_M2}
GLUCOSE SERPL-MCNC: 148 MG/DL (ref 70–99)
HCT VFR BLD AUTO: 41.3 % (ref 36–48)
HGB BLD-MCNC: 13.5 G/DL (ref 12–16)
LYMPHOCYTES # BLD: 0.4 K/UL (ref 1–5.1)
LYMPHOCYTES NFR BLD: 6.2 %
MCH RBC QN AUTO: 30.5 PG (ref 26–34)
MCHC RBC AUTO-ENTMCNC: 32.6 G/DL (ref 31–36)
MCV RBC AUTO: 93.5 FL (ref 80–100)
MONOCYTES # BLD: 0.2 K/UL (ref 0–1.3)
MONOCYTES NFR BLD: 2.6 %
NEUTROPHILS # BLD: 6.3 K/UL (ref 1.7–7.7)
NEUTROPHILS NFR BLD: 90.9 %
PLATELET # BLD AUTO: 196 K/UL (ref 135–450)
PMV BLD AUTO: 7.9 FL (ref 5–10.5)
POTASSIUM SERPL-SCNC: 4.4 MMOL/L (ref 3.5–5.1)
PROCALCITONIN SERPL IA-MCNC: 0.06 NG/ML (ref 0–0.15)
RBC # BLD AUTO: 4.42 M/UL (ref 4–5.2)
SODIUM SERPL-SCNC: 141 MMOL/L (ref 136–145)
WBC # BLD AUTO: 6.9 K/UL (ref 4–11)

## 2025-07-13 PROCEDURE — 80048 BASIC METABOLIC PNL TOTAL CA: CPT

## 2025-07-13 PROCEDURE — 94761 N-INVAS EAR/PLS OXIMETRY MLT: CPT

## 2025-07-13 PROCEDURE — 84145 PROCALCITONIN (PCT): CPT

## 2025-07-13 PROCEDURE — 6370000000 HC RX 637 (ALT 250 FOR IP): Performed by: INTERNAL MEDICINE

## 2025-07-13 PROCEDURE — 6370000000 HC RX 637 (ALT 250 FOR IP): Performed by: HOSPITALIST

## 2025-07-13 PROCEDURE — 2500000003 HC RX 250 WO HCPCS

## 2025-07-13 PROCEDURE — 93010 ELECTROCARDIOGRAM REPORT: CPT | Performed by: INTERNAL MEDICINE

## 2025-07-13 PROCEDURE — 1200000000 HC SEMI PRIVATE

## 2025-07-13 PROCEDURE — 6360000002 HC RX W HCPCS: Performed by: HOSPITALIST

## 2025-07-13 PROCEDURE — 2500000003 HC RX 250 WO HCPCS: Performed by: HOSPITALIST

## 2025-07-13 PROCEDURE — 36415 COLL VENOUS BLD VENIPUNCTURE: CPT

## 2025-07-13 PROCEDURE — 2580000003 HC RX 258: Performed by: HOSPITALIST

## 2025-07-13 PROCEDURE — 2700000000 HC OXYGEN THERAPY PER DAY

## 2025-07-13 PROCEDURE — 85025 COMPLETE CBC W/AUTO DIFF WBC: CPT

## 2025-07-13 PROCEDURE — 94640 AIRWAY INHALATION TREATMENT: CPT

## 2025-07-13 PROCEDURE — 99223 1ST HOSP IP/OBS HIGH 75: CPT | Performed by: INTERNAL MEDICINE

## 2025-07-13 RX ORDER — VITAMIN B COMPLEX
2000 TABLET ORAL DAILY
Status: DISCONTINUED | OUTPATIENT
Start: 2025-07-13 | End: 2025-07-15 | Stop reason: HOSPADM

## 2025-07-13 RX ORDER — BUDESONIDE AND FORMOTEROL FUMARATE DIHYDRATE 160; 4.5 UG/1; UG/1
2 AEROSOL RESPIRATORY (INHALATION)
Status: DISCONTINUED | OUTPATIENT
Start: 2025-07-13 | End: 2025-07-15 | Stop reason: HOSPADM

## 2025-07-13 RX ORDER — TRAZODONE HYDROCHLORIDE 50 MG/1
50 TABLET ORAL NIGHTLY
Status: DISCONTINUED | OUTPATIENT
Start: 2025-07-13 | End: 2025-07-15 | Stop reason: HOSPADM

## 2025-07-13 RX ORDER — DULOXETIN HYDROCHLORIDE 60 MG/1
60 CAPSULE, DELAYED RELEASE ORAL DAILY
Status: DISCONTINUED | OUTPATIENT
Start: 2025-07-13 | End: 2025-07-15 | Stop reason: HOSPADM

## 2025-07-13 RX ORDER — DULOXETIN HYDROCHLORIDE 30 MG/1
30 CAPSULE, DELAYED RELEASE ORAL EVERY EVENING
Status: DISCONTINUED | OUTPATIENT
Start: 2025-07-13 | End: 2025-07-15 | Stop reason: HOSPADM

## 2025-07-13 RX ORDER — CETIRIZINE HYDROCHLORIDE 10 MG/1
10 TABLET ORAL DAILY PRN
Status: DISCONTINUED | OUTPATIENT
Start: 2025-07-13 | End: 2025-07-15 | Stop reason: HOSPADM

## 2025-07-13 RX ORDER — WATER 10 ML/10ML
INJECTION INTRAMUSCULAR; INTRAVENOUS; SUBCUTANEOUS
Status: COMPLETED
Start: 2025-07-13 | End: 2025-07-13

## 2025-07-13 RX ADMIN — IPRATROPIUM BROMIDE AND ALBUTEROL SULFATE 1 DOSE: .5; 2.5 SOLUTION RESPIRATORY (INHALATION) at 11:35

## 2025-07-13 RX ADMIN — GABAPENTIN 600 MG: 300 CAPSULE ORAL at 08:21

## 2025-07-13 RX ADMIN — DULOXETINE 30 MG: 30 CAPSULE, DELAYED RELEASE ORAL at 17:57

## 2025-07-13 RX ADMIN — OXYCODONE AND ACETAMINOPHEN 1 TABLET: 7.5; 325 TABLET ORAL at 21:42

## 2025-07-13 RX ADMIN — GABAPENTIN 600 MG: 300 CAPSULE ORAL at 20:49

## 2025-07-13 RX ADMIN — GUAIFENESIN SYRUP AND DEXTROMETHORPHAN 5 ML: 100; 10 SYRUP ORAL at 20:49

## 2025-07-13 RX ADMIN — METHYLPREDNISOLONE SODIUM SUCCINATE 40 MG: 40 INJECTION INTRAMUSCULAR; INTRAVENOUS at 15:38

## 2025-07-13 RX ADMIN — ENOXAPARIN SODIUM 40 MG: 100 INJECTION SUBCUTANEOUS at 08:21

## 2025-07-13 RX ADMIN — DOXYCYCLINE 100 MG: 100 INJECTION, POWDER, LYOPHILIZED, FOR SOLUTION INTRAVENOUS at 04:24

## 2025-07-13 RX ADMIN — DOXYCYCLINE 100 MG: 100 INJECTION, POWDER, LYOPHILIZED, FOR SOLUTION INTRAVENOUS at 15:40

## 2025-07-13 RX ADMIN — TRAZODONE HYDROCHLORIDE 50 MG: 50 TABLET ORAL at 20:49

## 2025-07-13 RX ADMIN — BUDESONIDE AND FORMOTEROL FUMARATE DIHYDRATE 2 PUFF: 80; 4.5 AEROSOL RESPIRATORY (INHALATION) at 08:02

## 2025-07-13 RX ADMIN — BUDESONIDE AND FORMOTEROL FUMARATE DIHYDRATE 2 PUFF: 160; 4.5 AEROSOL RESPIRATORY (INHALATION) at 11:44

## 2025-07-13 RX ADMIN — SODIUM CHLORIDE, PRESERVATIVE FREE 5 ML: 5 INJECTION INTRAVENOUS at 08:22

## 2025-07-13 RX ADMIN — IPRATROPIUM BROMIDE AND ALBUTEROL SULFATE 1 DOSE: .5; 2.5 SOLUTION RESPIRATORY (INHALATION) at 15:57

## 2025-07-13 RX ADMIN — WATER 10 ML: 1 INJECTION INTRAMUSCULAR; INTRAVENOUS; SUBCUTANEOUS at 15:38

## 2025-07-13 RX ADMIN — OXYCODONE AND ACETAMINOPHEN 1 TABLET: 7.5; 325 TABLET ORAL at 15:42

## 2025-07-13 RX ADMIN — METHYLPREDNISOLONE SODIUM SUCCINATE 40 MG: 40 INJECTION INTRAMUSCULAR; INTRAVENOUS at 04:24

## 2025-07-13 RX ADMIN — Medication 2000 UNITS: at 11:19

## 2025-07-13 RX ADMIN — ASPIRIN 81 MG: 81 TABLET, DELAYED RELEASE ORAL at 08:21

## 2025-07-13 RX ADMIN — IPRATROPIUM BROMIDE AND ALBUTEROL SULFATE 1 DOSE: .5; 2.5 SOLUTION RESPIRATORY (INHALATION) at 08:02

## 2025-07-13 RX ADMIN — BUDESONIDE AND FORMOTEROL FUMARATE DIHYDRATE 2 PUFF: 160; 4.5 AEROSOL RESPIRATORY (INHALATION) at 20:24

## 2025-07-13 RX ADMIN — SODIUM CHLORIDE, PRESERVATIVE FREE 10 ML: 5 INJECTION INTRAVENOUS at 20:49

## 2025-07-13 RX ADMIN — IPRATROPIUM BROMIDE AND ALBUTEROL SULFATE 1 DOSE: .5; 2.5 SOLUTION RESPIRATORY (INHALATION) at 20:24

## 2025-07-13 RX ADMIN — ATORVASTATIN CALCIUM 80 MG: 80 TABLET, FILM COATED ORAL at 20:49

## 2025-07-13 RX ADMIN — DULOXETINE 60 MG: 60 CAPSULE, DELAYED RELEASE ORAL at 11:19

## 2025-07-13 ASSESSMENT — PAIN DESCRIPTION - DESCRIPTORS
DESCRIPTORS: ACHING
DESCRIPTORS: DISCOMFORT;ACHING

## 2025-07-13 ASSESSMENT — PAIN SCALES - GENERAL
PAINLEVEL_OUTOF10: 5
PAINLEVEL_OUTOF10: 6
PAINLEVEL_OUTOF10: 7

## 2025-07-13 ASSESSMENT — PAIN DESCRIPTION - LOCATION
LOCATION: BACK
LOCATION: BACK

## 2025-07-13 ASSESSMENT — PAIN DESCRIPTION - FREQUENCY: FREQUENCY: CONTINUOUS

## 2025-07-13 ASSESSMENT — PAIN SCALES - WONG BAKER: WONGBAKER_NUMERICALRESPONSE: NO HURT

## 2025-07-13 ASSESSMENT — PAIN DESCRIPTION - ORIENTATION: ORIENTATION: RIGHT;LOWER

## 2025-07-13 ASSESSMENT — PAIN DESCRIPTION - ONSET: ONSET: ON-GOING

## 2025-07-13 ASSESSMENT — PAIN DESCRIPTION - PAIN TYPE: TYPE: ACUTE PAIN

## 2025-07-13 NOTE — ACP (ADVANCE CARE PLANNING)
Advanced Care Planning Note.    Purpose of Encounter: Advanced care planning in light of acute and chronic deteriorating medical conditions.    Parties In Attendance: Patient (Lalita Pimentel),  Michelle Knight MD  (myself)    Decisional Capacity: Yes    Subjective: Patient/family understand in this voluntary conversation that Lalita Pimentel continues to deteriorate and discuss what inteventions and plans patient would want implemented in light of the following diagnoses:    COPD     Objective: Pt has been having chronic decline in functional status with increased dependence on others for ADLs  Increased frequency of Hospitalizations.  Likelihood of further hospitalizations with likelihood of escalation of medical interventions with the expectation of returning to a diminishing baseline level of functionality.      Discussion highlights: I discussed with patient the ramifications of their acute and chronic medical problems- and the likely outcomes expected, both in terms of statistics, and as part of my experience seeing patients with similar conditions.      Goals of Care Determination:  Patient wishes to remain Full code for now, but has interest in continuing this conversation, and discussing with family before making any changes to code status and goals of care parameters.      Plan: Continue to educate patient on medical conditions and the choices available regarding possible outcomes with regard to goals of care and code status.         Time spent on Advanced care Plannin  minutes    Michelle Knight MD  2025 10:05 AM

## 2025-07-13 NOTE — PLAN OF CARE
Problem: Safety - Adult  Goal: Free from fall injury  7/13/2025 1113 by Laurie Charles RN  Outcome: Progressing  7/12/2025 2241 by Allison Arenas RN  Outcome: Progressing     Problem: Chronic Conditions and Co-morbidities  Goal: Patient's chronic conditions and co-morbidity symptoms are monitored and maintained or improved  7/13/2025 1113 by Laurie Charles RN  Outcome: Progressing  Flowsheets (Taken 7/12/2025 2336 by Minerva Da Silva, RN)  Care Plan - Patient's Chronic Conditions and Co-Morbidity Symptoms are Monitored and Maintained or Improved: Monitor and assess patient's chronic conditions and comorbid symptoms for stability, deterioration, or improvement  7/12/2025 2241 by Allison Arenas, RN  Outcome: Progressing     Problem: Discharge Planning  Goal: Discharge to home or other facility with appropriate resources  7/13/2025 1113 by Laurie Charles RN  Outcome: Progressing  Flowsheets (Taken 7/12/2025 2336 by Minerva Da Silva, RN)  Discharge to home or other facility with appropriate resources: Identify barriers to discharge with patient and caregiver  7/12/2025 2241 by Allison Arenas RN  Outcome: Progressing  Flowsheets (Taken 7/12/2025 1552 by Tammie Manriquez RN)  Discharge to home or other facility with appropriate resources:   Identify barriers to discharge with patient and caregiver   Identify discharge learning needs (meds, wound care, etc)   Refer to discharge planning if patient needs post-hospital services based on physician order or complex needs related to functional status, cognitive ability or social support system   Arrange for needed discharge resources and transportation as appropriate     Problem: Pain  Goal: Verbalizes/displays adequate comfort level or baseline comfort level  7/13/2025 1113 by Laurie Charles, RN  Outcome: Progressing  7/12/2025 2241 by Allison Arenas, RN  Outcome: Progressing

## 2025-07-13 NOTE — PLAN OF CARE
Problem: Safety - Adult  Goal: Free from fall injury  Outcome: Progressing     Problem: Chronic Conditions and Co-morbidities  Goal: Patient's chronic conditions and co-morbidity symptoms are monitored and maintained or improved  Outcome: Progressing     Problem: Discharge Planning  Goal: Discharge to home or other facility with appropriate resources  Outcome: Progressing  Flowsheets (Taken 7/12/2025 5492 by Tammie Manriquez RN)  Discharge to home or other facility with appropriate resources:   Identify barriers to discharge with patient and caregiver   Identify discharge learning needs (meds, wound care, etc)   Refer to discharge planning if patient needs post-hospital services based on physician order or complex needs related to functional status, cognitive ability or social support system   Arrange for needed discharge resources and transportation as appropriate     Problem: Pain  Goal: Verbalizes/displays adequate comfort level or baseline comfort level  Outcome: Progressing

## 2025-07-14 PROBLEM — E44.0 MODERATE MALNUTRITION: Status: ACTIVE | Noted: 2025-07-14

## 2025-07-14 PROCEDURE — 6360000002 HC RX W HCPCS: Performed by: HOSPITALIST

## 2025-07-14 PROCEDURE — 94640 AIRWAY INHALATION TREATMENT: CPT

## 2025-07-14 PROCEDURE — 99233 SBSQ HOSP IP/OBS HIGH 50: CPT | Performed by: INTERNAL MEDICINE

## 2025-07-14 PROCEDURE — 1200000000 HC SEMI PRIVATE

## 2025-07-14 PROCEDURE — 6370000000 HC RX 637 (ALT 250 FOR IP): Performed by: INTERNAL MEDICINE

## 2025-07-14 PROCEDURE — 94761 N-INVAS EAR/PLS OXIMETRY MLT: CPT

## 2025-07-14 PROCEDURE — 2700000000 HC OXYGEN THERAPY PER DAY

## 2025-07-14 PROCEDURE — 6370000000 HC RX 637 (ALT 250 FOR IP): Performed by: HOSPITALIST

## 2025-07-14 PROCEDURE — 2580000003 HC RX 258: Performed by: INTERNAL MEDICINE

## 2025-07-14 PROCEDURE — 2580000003 HC RX 258: Performed by: HOSPITALIST

## 2025-07-14 PROCEDURE — 2500000003 HC RX 250 WO HCPCS: Performed by: HOSPITALIST

## 2025-07-14 PROCEDURE — 2500000003 HC RX 250 WO HCPCS

## 2025-07-14 RX ORDER — SODIUM CHLORIDE FOR INHALATION 3 %
15 VIAL, NEBULIZER (ML) INHALATION
Status: DISCONTINUED | OUTPATIENT
Start: 2025-07-14 | End: 2025-07-15 | Stop reason: HOSPADM

## 2025-07-14 RX ORDER — WATER 10 ML/10ML
INJECTION INTRAMUSCULAR; INTRAVENOUS; SUBCUTANEOUS
Status: COMPLETED
Start: 2025-07-14 | End: 2025-07-14

## 2025-07-14 RX ADMIN — IPRATROPIUM BROMIDE AND ALBUTEROL SULFATE 1 DOSE: .5; 2.5 SOLUTION RESPIRATORY (INHALATION) at 11:41

## 2025-07-14 RX ADMIN — IPRATROPIUM BROMIDE AND ALBUTEROL SULFATE 1 DOSE: .5; 2.5 SOLUTION RESPIRATORY (INHALATION) at 07:38

## 2025-07-14 RX ADMIN — SODIUM CHLORIDE, PRESERVATIVE FREE 10 ML: 5 INJECTION INTRAVENOUS at 22:04

## 2025-07-14 RX ADMIN — DULOXETINE 30 MG: 30 CAPSULE, DELAYED RELEASE ORAL at 16:58

## 2025-07-14 RX ADMIN — ENOXAPARIN SODIUM 40 MG: 100 INJECTION SUBCUTANEOUS at 09:45

## 2025-07-14 RX ADMIN — DULOXETINE 60 MG: 60 CAPSULE, DELAYED RELEASE ORAL at 09:45

## 2025-07-14 RX ADMIN — OXYCODONE AND ACETAMINOPHEN 1 TABLET: 7.5; 325 TABLET ORAL at 05:17

## 2025-07-14 RX ADMIN — DOXYCYCLINE 100 MG: 100 INJECTION, POWDER, LYOPHILIZED, FOR SOLUTION INTRAVENOUS at 04:43

## 2025-07-14 RX ADMIN — Medication 2000 UNITS: at 09:45

## 2025-07-14 RX ADMIN — WATER 10 ML: 1 INJECTION INTRAMUSCULAR; INTRAVENOUS; SUBCUTANEOUS at 16:58

## 2025-07-14 RX ADMIN — METHYLPREDNISOLONE SODIUM SUCCINATE 40 MG: 40 INJECTION INTRAMUSCULAR; INTRAVENOUS at 16:58

## 2025-07-14 RX ADMIN — ASPIRIN 81 MG: 81 TABLET, DELAYED RELEASE ORAL at 09:44

## 2025-07-14 RX ADMIN — IPRATROPIUM BROMIDE AND ALBUTEROL SULFATE 1 DOSE: .5; 2.5 SOLUTION RESPIRATORY (INHALATION) at 20:02

## 2025-07-14 RX ADMIN — OXYCODONE AND ACETAMINOPHEN 1 TABLET: 7.5; 325 TABLET ORAL at 13:07

## 2025-07-14 RX ADMIN — TRAZODONE HYDROCHLORIDE 50 MG: 50 TABLET ORAL at 22:04

## 2025-07-14 RX ADMIN — ATORVASTATIN CALCIUM 80 MG: 80 TABLET, FILM COATED ORAL at 22:04

## 2025-07-14 RX ADMIN — Medication 15 ML: at 11:41

## 2025-07-14 RX ADMIN — GABAPENTIN 600 MG: 300 CAPSULE ORAL at 22:04

## 2025-07-14 RX ADMIN — Medication 15 ML: at 15:55

## 2025-07-14 RX ADMIN — BUDESONIDE AND FORMOTEROL FUMARATE DIHYDRATE 2 PUFF: 160; 4.5 AEROSOL RESPIRATORY (INHALATION) at 20:03

## 2025-07-14 RX ADMIN — METHYLPREDNISOLONE SODIUM SUCCINATE 40 MG: 40 INJECTION INTRAMUSCULAR; INTRAVENOUS at 04:44

## 2025-07-14 RX ADMIN — DOXYCYCLINE 100 MG: 100 INJECTION, POWDER, LYOPHILIZED, FOR SOLUTION INTRAVENOUS at 17:01

## 2025-07-14 RX ADMIN — BUDESONIDE AND FORMOTEROL FUMARATE DIHYDRATE 2 PUFF: 160; 4.5 AEROSOL RESPIRATORY (INHALATION) at 07:38

## 2025-07-14 RX ADMIN — GABAPENTIN 600 MG: 300 CAPSULE ORAL at 09:44

## 2025-07-14 RX ADMIN — Medication 15 ML: at 20:02

## 2025-07-14 RX ADMIN — IPRATROPIUM BROMIDE AND ALBUTEROL SULFATE 1 DOSE: .5; 2.5 SOLUTION RESPIRATORY (INHALATION) at 15:55

## 2025-07-14 ASSESSMENT — PAIN DESCRIPTION - DESCRIPTORS
DESCRIPTORS: ACHING;DISCOMFORT
DESCRIPTORS: ACHING

## 2025-07-14 ASSESSMENT — PAIN DESCRIPTION - ORIENTATION
ORIENTATION: LOWER
ORIENTATION: UPPER

## 2025-07-14 ASSESSMENT — PAIN SCALES - GENERAL
PAINLEVEL_OUTOF10: 8
PAINLEVEL_OUTOF10: 7

## 2025-07-14 ASSESSMENT — PAIN DESCRIPTION - LOCATION
LOCATION: BACK
LOCATION: BACK

## 2025-07-14 NOTE — CARE COORDINATION
Case Management Assessment  Initial Evaluation    Date/Time of Evaluation: 7/14/2025 1:11 PM  Assessment Completed by: Devika Barkley    If patient is discharged prior to next notation, then this note serves as note for discharge by case management.    Patient Name: Lalita Pimentel                   YOB: 1945  Diagnosis: Respiratory acidosis [E87.29]  COPD exacerbation (HCC) [J44.1]  COPD with acute exacerbation (HCC) [J44.1]  Acute respiratory failure with hypoxia (HCC) [J96.01]                   Date / Time: 7/12/2025 12:20 PM    Patient Admission Status: Inpatient   Readmission Risk (Low < 19, Mod (19-27), High > 27): Readmission Risk Score: 11.7    Current PCP: Belkis Silva APRN - CNP  PCP verified by CM? Yes    Chart Reviewed: Yes      History Provided by: Patient  Patient Orientation: Alert and Oriented    Patient Cognition: Alert    Hospitalization in the last 30 days (Readmission):  No    If yes, Readmission Assessment in CM Navigator will be completed.    Advance Directives:      Code Status: Full Code   Patient's Primary Decision Maker is: Legal Next of Kin    Primary Decision Maker: Domi Burr - Child - [phone]    Primary Decision Maker: Peggy James - Child - [phone]    Primary Decision Maker: Petty Burr - Child - [phone]    Primary Decision Maker: Alis Talley    Discharge Planning:    Patient lives with: Spouse/Significant Other Type of Home: Apartment  Primary Care Giver: Self  Patient Support Systems include: Family Members   Current Financial resources: Medicare  Current community resources: None  Current services prior to admission: Durable Medical Equipment            Current DME: Shower Chair, Cane            Type of Home Care services:  None    ADLS  Prior functional level: Independent in ADLs/IADLs  Current functional level: Independent in ADLs/IADLs    PT AM-PAC:   /24  OT AM-PAC:   /24    Family can provide assistance at DC: Yes  Would you

## 2025-07-14 NOTE — PLAN OF CARE
Problem: Safety - Adult  Goal: Free from fall injury  Outcome: Progressing     Problem: Chronic Conditions and Co-morbidities  Goal: Patient's chronic conditions and co-morbidity symptoms are monitored and maintained or improved  Outcome: Progressing     Problem: Discharge Planning  Goal: Discharge to home or other facility with appropriate resources  Outcome: Progressing     Problem: Pain  Goal: Verbalizes/displays adequate comfort level or baseline comfort level  Outcome: Progressing     Problem: Nutrition Deficit:  Goal: Optimize nutritional status  Outcome: Progressing     Problem: Respiratory - Adult  Goal: Achieves optimal ventilation and oxygenation  Outcome: Progressing     Problem: Infection - Adult  Goal: Absence of infection at discharge  Outcome: Progressing

## 2025-07-14 NOTE — CARE COORDINATION
Advance Care Planning   Healthcare Decision Maker:    Primary Decision Maker: Domi Burr - Child - [phone]    Primary Decision Maker: ErikaPeggy - Child - [phone]    Primary Decision Maker: Petty Burr - Child - [phone]    Primary Decision Maker: Alis Talley - Child      Today we documented Decision Maker(s) consistent with Legal Next of Kin hierarchy.     Electronically signed by Devika Barkley on 7/14/2025 at 1:11 PM

## 2025-07-15 VITALS
WEIGHT: 130.73 LBS | OXYGEN SATURATION: 91 % | RESPIRATION RATE: 17 BRPM | DIASTOLIC BLOOD PRESSURE: 57 MMHG | SYSTOLIC BLOOD PRESSURE: 127 MMHG | BODY MASS INDEX: 22.32 KG/M2 | HEART RATE: 61 BPM | TEMPERATURE: 97.9 F | HEIGHT: 64 IN

## 2025-07-15 PROCEDURE — 2580000003 HC RX 258: Performed by: HOSPITALIST

## 2025-07-15 PROCEDURE — 6370000000 HC RX 637 (ALT 250 FOR IP): Performed by: INTERNAL MEDICINE

## 2025-07-15 PROCEDURE — 2500000003 HC RX 250 WO HCPCS

## 2025-07-15 PROCEDURE — 99232 SBSQ HOSP IP/OBS MODERATE 35: CPT | Performed by: INTERNAL MEDICINE

## 2025-07-15 PROCEDURE — 6370000000 HC RX 637 (ALT 250 FOR IP): Performed by: HOSPITALIST

## 2025-07-15 PROCEDURE — 6360000002 HC RX W HCPCS: Performed by: HOSPITALIST

## 2025-07-15 PROCEDURE — 94640 AIRWAY INHALATION TREATMENT: CPT

## 2025-07-15 PROCEDURE — 2700000000 HC OXYGEN THERAPY PER DAY

## 2025-07-15 PROCEDURE — 94761 N-INVAS EAR/PLS OXIMETRY MLT: CPT

## 2025-07-15 RX ORDER — WATER 10 ML/10ML
INJECTION INTRAMUSCULAR; INTRAVENOUS; SUBCUTANEOUS
Status: COMPLETED
Start: 2025-07-15 | End: 2025-07-15

## 2025-07-15 RX ORDER — BUDESONIDE AND FORMOTEROL FUMARATE DIHYDRATE 160; 4.5 UG/1; UG/1
2 AEROSOL RESPIRATORY (INHALATION)
Qty: 10.2 G | Refills: 3 | Status: SHIPPED | OUTPATIENT
Start: 2025-07-15

## 2025-07-15 RX ORDER — PREDNISONE 20 MG/1
40 TABLET ORAL DAILY
Qty: 10 TABLET | Refills: 0 | Status: SHIPPED | OUTPATIENT
Start: 2025-07-16 | End: 2025-07-21

## 2025-07-15 RX ORDER — GUAIFENESIN/DEXTROMETHORPHAN 100-10MG/5
5 SYRUP ORAL EVERY 4 HOURS PRN
Qty: 120 ML | Refills: 0 | Status: SHIPPED | OUTPATIENT
Start: 2025-07-15 | End: 2025-07-25

## 2025-07-15 RX ORDER — PREDNISONE 20 MG/1
40 TABLET ORAL DAILY
Status: DISCONTINUED | OUTPATIENT
Start: 2025-07-16 | End: 2025-07-15 | Stop reason: HOSPADM

## 2025-07-15 RX ADMIN — Medication 15 ML: at 09:02

## 2025-07-15 RX ADMIN — DULOXETINE 60 MG: 60 CAPSULE, DELAYED RELEASE ORAL at 08:56

## 2025-07-15 RX ADMIN — OXYCODONE AND ACETAMINOPHEN 1 TABLET: 7.5; 325 TABLET ORAL at 12:12

## 2025-07-15 RX ADMIN — ASPIRIN 81 MG: 81 TABLET, DELAYED RELEASE ORAL at 08:56

## 2025-07-15 RX ADMIN — IPRATROPIUM BROMIDE AND ALBUTEROL SULFATE 1 DOSE: .5; 2.5 SOLUTION RESPIRATORY (INHALATION) at 11:52

## 2025-07-15 RX ADMIN — ENOXAPARIN SODIUM 40 MG: 100 INJECTION SUBCUTANEOUS at 08:56

## 2025-07-15 RX ADMIN — WATER 1 ML: 1 INJECTION INTRAMUSCULAR; INTRAVENOUS; SUBCUTANEOUS at 05:35

## 2025-07-15 RX ADMIN — GABAPENTIN 600 MG: 300 CAPSULE ORAL at 08:56

## 2025-07-15 RX ADMIN — DOXYCYCLINE 100 MG: 100 INJECTION, POWDER, LYOPHILIZED, FOR SOLUTION INTRAVENOUS at 04:54

## 2025-07-15 RX ADMIN — BUDESONIDE AND FORMOTEROL FUMARATE DIHYDRATE 2 PUFF: 160; 4.5 AEROSOL RESPIRATORY (INHALATION) at 09:02

## 2025-07-15 RX ADMIN — OXYCODONE AND ACETAMINOPHEN 1 TABLET: 7.5; 325 TABLET ORAL at 04:57

## 2025-07-15 RX ADMIN — METHYLPREDNISOLONE SODIUM SUCCINATE 40 MG: 40 INJECTION INTRAMUSCULAR; INTRAVENOUS at 04:50

## 2025-07-15 RX ADMIN — IPRATROPIUM BROMIDE AND ALBUTEROL SULFATE 1 DOSE: .5; 2.5 SOLUTION RESPIRATORY (INHALATION) at 09:02

## 2025-07-15 RX ADMIN — Medication 2000 UNITS: at 08:56

## 2025-07-15 RX ADMIN — Medication 15 ML: at 11:52

## 2025-07-15 ASSESSMENT — PAIN - FUNCTIONAL ASSESSMENT: PAIN_FUNCTIONAL_ASSESSMENT: ACTIVITIES ARE NOT PREVENTED

## 2025-07-15 ASSESSMENT — PAIN SCALES - GENERAL
PAINLEVEL_OUTOF10: 7
PAINLEVEL_OUTOF10: 6
PAINLEVEL_OUTOF10: 3

## 2025-07-15 ASSESSMENT — PAIN DESCRIPTION - ORIENTATION
ORIENTATION: LOWER
ORIENTATION: MID;UPPER

## 2025-07-15 ASSESSMENT — PAIN DESCRIPTION - DESCRIPTORS
DESCRIPTORS: ACHING
DESCRIPTORS: ACHING

## 2025-07-15 ASSESSMENT — PAIN DESCRIPTION - LOCATION
LOCATION: BACK
LOCATION: BACK

## 2025-07-15 NOTE — DISCHARGE INSTR - COC
Continuity of Care Form    Patient Name: Lalita Pimentel   :  1945  MRN:  [id number]    Admit date:  2025  Discharge date:  ***    Code Status Order: Full Code   Advance Directives:     Admitting Physician:  Michelle Knight MD  PCP: Belkis Silva, APRN - CNP    Discharging Nurse: ***  Discharging Hospital Unit/Room#: P7Q-4963/4132-01  Discharging Unit Phone Number: ***    Emergency Contact:   Extended Emergency Contact Information  Primary Emergency Contact: Domi Burr  Home Phone: [phone]  Work Phone: [phone]  Mobile Phone: [phone]  Relation: Child   needed? No  Secondary Emergency Contact: Peggy James  Home Phone: [phone]  Work Phone: [phone]  Mobile Phone: [phone]  Relation: Child   needed? No    Past Surgical History:  Past Surgical History:   Procedure Laterality Date    BRONCHOSCOPY N/A 2022    BRONCHOSCOPY performed by Jose Eduardo Shcmitz DO at [facility]    CARDIAC CATHETERIZATION  2022    with pci    CARPAL TUNNEL RELEASE Bilateral     CATARACT EXTRACTION      CERVICAL POLYP REMOVAL  2004    CORONARY ANGIOPLASTY WITH STENT PLACEMENT  2022    LCx 99. PCI/stent.    DIAGNOSTIC CARDIAC CATH LAB PROCEDURE Left 2022    INTRACAPSULAR CATARACT EXTRACTION Left 2020    Phacoemulsification with intraocular lens implant performed by Walt Malhotra MD at [facility]    INTRACAPSULAR CATARACT EXTRACTION Right 2020    Phacoemulsification with intraocular lens implant performed by Walt Malhotra MD at [facility]    TUBAL LIGATION      TYMPANOSTOMY TUBE PLACEMENT      patient denies        Immunization History:   Immunization History   Administered Date(s) Administered    COVID-19, MODERNA BLUE border, Primary or Immunocompromised, (age 12y+), IM, 100 mcg/0.5mL 2021, 2021    COVID-19, MODERNA Booster BLUE border, (age 18y+), IM, 50mcg/0.25mL 2021, 2022

## 2025-07-15 NOTE — DISCHARGE INSTRUCTIONS
antibiotics, take them as directed. Do not stop taking them just because you feel better. You need to take the full course of antibiotics.  If your doctor prescribed oxygen, use the flow rate your doctor has recommended. Do not change it without talking to your doctor first.  If your doctor has not set you up with a pulmonary rehabilitation program, ask if rehab is right for you. Rehab includes exercise programs, education about your disease and how to manage it, help with diet and other changes, and emotional support.  If you smoke, try to quit. If you can't quit, cut back as much as you can. Quitting is the most important step you can take to slow the disease and help you feel better. If you need help quitting, talk to your doctor about stop-smoking programs and medicines. These can increase your chances of quitting for good.  Try to avoid infections such as COVID-19, colds, and the flu. Wash your hands often. You may want to wear a mask when you go to public indoor spaces. Try to avoid sick people.  Stay up to date on vaccines. This includes getting a flu vaccine every year.  Try to avoid things that could make your symptoms worse. These include chemical fumes, factory dust, soot, and air pollution. Talk to your doctor about ways to protect yourself if you are exposed to substances that irritate your lungs at home or at work.  Take care of your teeth and gums. Get regular dental checkups. This can help you stay healthy.  When should you call for help?    Call 911 anytime you think you may need emergency care. For example, call if:  You have severe trouble breathing.  You have severe chest pain, or chest pain is quickly getting worse.  Call your doctor now or seek immediate medical care if:  You have new or worse trouble breathing.  Your coughing or wheezing gets worse.  You cough up dark brown or bloody mucus (sputum).  You have a new or higher fever.  Watch closely for changes in your health, and be sure to

## 2025-07-15 NOTE — CARE COORDINATION
DISCHARGE SUMMARY     DATE OF DISCHARGE: 7/15/25    DISCHARGE DESTINATION: Home    HOME CARE: No    HEMODIALYSIS: No    TRANSPORTATION: Private Car    NEW DME ORDERED: no    COMMENTS: AVS reviewed/no discharge needs are identified.Electronically signed by Cydney Lomax RN on 7/15/2025 at 4:42 PM

## 2025-07-15 NOTE — PLAN OF CARE
Problem: Safety - Adult  Goal: Free from fall injury  7/15/2025 1553 by Paul Rangel RN  Outcome: Completed  7/15/2025 0357 by Anson Samson RN  Outcome: Progressing     Problem: Chronic Conditions and Co-morbidities  Goal: Patient's chronic conditions and co-morbidity symptoms are monitored and maintained or improved  7/15/2025 1553 by Paul Rangel RN  Outcome: Completed  7/15/2025 0357 by Anson Samson RN  Outcome: Progressing  Flowsheets (Taken 7/14/2025 2030)  Care Plan - Patient's Chronic Conditions and Co-Morbidity Symptoms are Monitored and Maintained or Improved:   Monitor and assess patient's chronic conditions and comorbid symptoms for stability, deterioration, or improvement   Collaborate with multidisciplinary team to address chronic and comorbid conditions and prevent exacerbation or deterioration   Update acute care plan with appropriate goals if chronic or comorbid symptoms are exacerbated and prevent overall improvement and discharge     Problem: Discharge Planning  Goal: Discharge to home or other facility with appropriate resources  7/15/2025 1553 by Paul Rangel RN  Outcome: Completed  7/15/2025 0357 by Anson Samson RN  Outcome: Progressing  Flowsheets (Taken 7/14/2025 2030)  Discharge to home or other facility with appropriate resources:   Identify barriers to discharge with patient and caregiver   Arrange for needed discharge resources and transportation as appropriate   Identify discharge learning needs (meds, wound care, etc)     Problem: Pain  Goal: Verbalizes/displays adequate comfort level or baseline comfort level  7/15/2025 1553 by Paul Rangel RN  Outcome: Completed  7/15/2025 0357 by Anson Samson RN  Outcome: Progressing  Flowsheets (Taken 7/14/2025 2030)  Verbalizes/displays adequate comfort level or baseline comfort level:   Encourage patient to monitor pain and request assistance   Notify Licensed Independent Practitioner if interventions unsuccessful

## 2025-07-15 NOTE — PLAN OF CARE
Problem: Safety - Adult  Goal: Free from fall injury  7/15/2025 0357 by Anson Samson RN  Outcome: Progressing  7/14/2025 1710 by Paul Rangel RN  Outcome: Progressing     Problem: Chronic Conditions and Co-morbidities  Goal: Patient's chronic conditions and co-morbidity symptoms are monitored and maintained or improved  7/15/2025 0357 by Anson Samson RN  Outcome: Progressing  Flowsheets (Taken 7/14/2025 2030)  Care Plan - Patient's Chronic Conditions and Co-Morbidity Symptoms are Monitored and Maintained or Improved:   Monitor and assess patient's chronic conditions and comorbid symptoms for stability, deterioration, or improvement   Collaborate with multidisciplinary team to address chronic and comorbid conditions and prevent exacerbation or deterioration   Update acute care plan with appropriate goals if chronic or comorbid symptoms are exacerbated and prevent overall improvement and discharge  7/14/2025 1710 by Paul Rangel RN  Outcome: Progressing     Problem: Discharge Planning  Goal: Discharge to home or other facility with appropriate resources  7/15/2025 0357 by Anson Samson RN  Outcome: Progressing  Flowsheets (Taken 7/14/2025 2030)  Discharge to home or other facility with appropriate resources:   Identify barriers to discharge with patient and caregiver   Arrange for needed discharge resources and transportation as appropriate   Identify discharge learning needs (meds, wound care, etc)  7/14/2025 1710 by Paul Rangel RN  Outcome: Progressing     Problem: Pain  Goal: Verbalizes/displays adequate comfort level or baseline comfort level  7/15/2025 0357 by Anson Samson RN  Outcome: Progressing  Flowsheets (Taken 7/14/2025 2030)  Verbalizes/displays adequate comfort level or baseline comfort level:   Encourage patient to monitor pain and request assistance   Notify Licensed Independent Practitioner if interventions unsuccessful or patient reports new pain   Assess pain using

## 2025-07-16 ENCOUNTER — TELEPHONE (OUTPATIENT)
Dept: FAMILY MEDICINE CLINIC | Age: 80
End: 2025-07-16

## 2025-07-16 NOTE — TELEPHONE ENCOUNTER
Care Transitions Initial Follow Up Call    Outreach made within 2 business days of discharge: Yes    Patient: Lalita Pimentel Patient : 1945   MRN: [id number]  Reason for Admission: SOB  Discharge Date: 7/15/25       Spoke with: Patient    Discharge department/facility: [facility]    TCM Interactive Patient Contact:  Was patient able to fill all prescriptions: Yes  Was patient instructed to bring all medications to the follow-up visit: Yes  Is patient taking all medications as directed in the discharge summary? Yes  Does patient understand their discharge instructions: Yes  Does patient have questions or concerns that need addressed prior to 7-14 day follow up office visit: no    Additional needs identified to be addressed with provider  No needs identified             Scheduled appointment with PCP within 7-14 days  Patient is scheduled for a hospital f/u    Follow Up  Future Appointments   Date Time Provider Department Center   2025  2:00 PM Babatunde Leach MD [facility]   2025 10:40 AM Antelmo Garza MD  PUL[facility]   2025 10:40 AM Belkis Silva, APRN - CNP [facility]       Myles Casillas MA

## 2025-07-16 NOTE — PROGRESS NOTES
07/15/25 1155   Resting (Room Air)   SpO2 91   Resting (On O2)   SpO2 93   O2 Device Nasal cannula   O2 Flow Rate (l/min) 1 l/min   During Walk (Room Air)   SpO2 90   After Walk   Does the Patient Qualify for Home O2 No     PT  dropped to 90 %on r/a with walk   says she is breathing fine    
  Physician Progress Note      PATIENT:               RACH TABOR  CSN #:                  [id number]  :                       1945  ADMIT DATE:       2025 12:20 PM  DISCH DATE:        7/15/2025 5:23 PM  RESPONDING  PROVIDER #:        Michelle Knight MD          QUERY TEXT:    Internal Medicine,    Noted documentation of acute on chronic hypoxic respiratory by Pulmonology and   the ED physician.  Based on your medical judgement, please clarify the   following:    -admitted with COPD exacerbation  PMH chronic hypercapnic resp failure documented in the H&P  -ED physician documents: \"Patient's pH and VBG shows acidosis with pH 7.304   with pCO2 of 61.8 and bicarb of 31.  Plan for admission to hospitalist team   for further management of patient COPD exacerbation, acute respiratory failure   with hypoxia and respiratory acidosis  -Pulmonology documents: \"Acute Hypoxic Respiratory Failure with saturations   less than 90% on room air. Titrate oxygen for saturations greater than or   equal to 90%, Might need home oxygen assessment prior to DC. COPD   exacerbation- Increase symbicort to higher dose, Continue with duonebs,   solumedrol, Vibramycin.\"  -normal pulmonary effort documented      Thank you    The clinical indicators include:  Options provided:  -- Acute  hypercapnic respiratory failure confirmed present on admission  -- Acute hypoxic and hypercapnic respiratory failure confirmed present on   admission  -- Respiratory failure) ruled out after study  -- Other - I will add my own diagnosis  -- Disagree - Not applicable / Not valid  -- Disagree - Clinically unable to determine / Unknown  -- Refer to Clinical Documentation Reviewer    PROVIDER RESPONSE TEXT:    The diagnosis of acute hypercapnic respiratory failure confirmed present on   admission    Query created by: Abimbola Ceron on 7/15/2025 2:32 PM      Electronically signed by:  Michelle Knight MD 2025 9:12 AM          
4 Eyes Skin Assessment     NAME:  Lalita Pimentel  YOB: 1945  MEDICAL RECORD NUMBER:  [id number]    The patient is being assessed for  Admission    I agree that at least one RN has performed a thorough Head to Toe Skin Assessment on the patient. ALL assessment sites listed below have been assessed.      Areas assessed by both nurses:    Head, Face, Ears, Shoulders, Back, Chest, Arms, Elbows, Hands, Sacrum. Buttock, Coccyx, Ischium, Legs. Feet and Heels, and Under Medical Devices         Does the Patient have a Wound? No noted wound(s)       Uriel Prevention initiated by RN: Yes  Wound Care Orders initiated by RN: Yes    Pressure Injury (Stage 1,2,3,4, Unstageable, DTI, NWPT, and Complex wounds) if present, place Wound referral order by RN under : No    New Ostomies, if present place, Ostomy referral order under : No     Nurse 1 eSignature: Electronically signed by Leslie Madsen RN on 7/12/25 at 5:58 PM EDT    **SHARE this note so that the co-signing nurse can place an eSignature**    Nurse 2 eSignature: {Esignature:[id number]}    
Comprehensive Nutrition Assessment    Type and Reason for Visit:  Positive nutrition screen, Initial    Nutrition Recommendations/Plan:   Continue regular diet   Discontinue Ensure Plus ONS TID   Start Magic Cup ONS (lunch; butter pecan)   Encourage and monitor po intake   Monitor weight trend     Malnutrition Assessment:  Malnutrition Status:  Moderate malnutrition (07/14/25 1441)    Context:  Chronic Illness     Findings of the 6 clinical characteristics of malnutrition:  Energy Intake:  75% or less estimated energy requirements for 1 month or longer (not while inpatient)  Weight Loss:  Mild weight loss     Body Fat Loss:  Mild body fat loss Triceps   Muscle Mass Loss:  Mild muscle mass loss Calf (gastrocnemius)  Fluid Accumulation:  No fluid accumulation     Strength:  Not Performed    Nutrition Assessment:    Triggers for nutritional risk with MST score of 2 for unintened weight loss and decreased appetite. Patient presents to ED for general fatigue. Admitted for COPD with acute exacerbation. PMH of hypercholesteremia, GERD, T2DM, and vitamin D deficiency. Patient has good appetite with % intake of meals since admission. Reports fair appetite at home with 50-75% intake meals. Discontinue Ensure ONS per patient request. Start Magic Cup ONS (lunch; butter pecan). Patient reported weight loss of 5-10# d/t decrease in appetite from age. Per weight chart, weights have been trending down from within last couple years. Encourage patient to increase po intake as tolerated. Recommended patient to have smaller more frequent meals when at home to help improve po intake. Criteria meet for malnutrition, see full malnutrition assessment. Will continue to monitor.    Nutrition Related Findings:    Labs 7/13: glucose 148(H). Wound Type: None       Current Nutrition Intake & Therapies:    Average Meal Intake: %  Average Supplements Intake: 0%  ADULT DIET; Regular  ADULT ORAL NUTRITION SUPPLEMENT; Breakfast, 
Patient arrives to room 4132 via stretcher. Patient is alert and oriented x 4. Respirations easy and even. No signs or symptoms of distress or discomfort. Oriented to room and call light. Fall precautions in place. Family at bedside. See initial assessment. Bed alarm active.   
Pt AO x4. VSS. Pt denies pain when asked this AM. Morning medications given whole, tolerated well. Morning assessment completed. Pt is SBA, turns self in the bed but will be reminded as needed.Pt has no questions or concerns. Standard safety measures in place.     Electronically signed by Laurie Charles RN on 7/13/2025 at 11:14 AM    
Pulmonary Progress Note    CC:  Follow up COPD    Subjective:  On 1 liters of oxygen  Doing better      ROS  Still SOB  No intake or output data in the 24 hours ending 07/15/25 0829        PHYSICAL EXAM:  Blood pressure (!) 127/57, pulse 61, temperature 97.9 °F (36.6 °C), temperature source Oral, resp. rate 17, height 1.626 m (5' 4.02\"), weight 59.3 kg (130 lb 11.7 oz), SpO2 91%, not currently breastfeeding.'  Gen: No distress.   Eyes: PERRL. No sclera icterus. No conjunctival injection.   ENT: No discharge. Pharynx clear. External appearance of ears and nose normal.  Neck: Trachea midline. No obvious mass.    Resp: Forced wheezing   CV: Regular rate. Regular rhythm. No murmur or rub.    GI: Non-tender. Non-distended. No hernia.   Skin: Warm, dry, normal texture and turgor. No nodule on exposed extremities.   Lymph: No cervical LAD. No supraclavicular LAD.   M/S: No cyanosis. No clubbing. No joint deformity.    Neuro: Moves all four extremities. CN 2-12 tested, no defect noted.  Ext:   no edema    Medications:    Scheduled Meds:   sodium chloride (Inhalant)  15 mL Nebulization Q4H WA RT    Vitamin D  2,000 Units Oral Daily    DULoxetine  30 mg Oral QPM    traZODone  50 mg Oral Nightly    DULoxetine  60 mg Oral Daily    budesonide-formoterol  2 puff Inhalation BID RT    aspirin  81 mg Oral Daily    atorvastatin  80 mg Oral Nightly    gabapentin  600 mg Oral BID    ipratropium 0.5 mg-albuterol 2.5 mg  1 Dose Inhalation 4x Daily RT    methylPREDNISolone  40 mg IntraVENous Q12H    doxycycline (VIBRAMYCIN) IV  100 mg IntraVENous Q12H    sodium chloride flush  5-40 mL IntraVENous 2 times per day    enoxaparin  40 mg SubCUTAneous Daily       Continuous Infusions:   sodium chloride         PRN Meds:  cetirizine, oxyCODONE-acetaminophen, pantoprazole, guaiFENesin-dextromethorphan, sodium chloride flush, sodium chloride, potassium chloride **OR** potassium alternative oral replacement **OR** potassium chloride, magnesium 
  Medical Decision Making:  The following items were considered in medical decision making:  Discussion of patient care with other providers  Reviewed clinical lab tests  Reviewed radiology tests  Reviewed other diagnostic tests/interventions  Independent review of radiologic images  Microbiology cultures and other micro tests reviewed            Subjective:     Chief Complaint:    Lalita Pimentel is a 79 y.o. female who presents with     Wheezing +    AAO x 3  No acute overnight events  Denies chest pain   Denies fever or chills or vomiting or abdominal pain         Review of Systems:      Pertinent positives and negatives discussed in HPI    Objective:     Intake/Output Summary (Last 24 hours) at 7/13/2025 1146  Last data filed at 7/13/2025 1000  Gross per 24 hour   Intake 820.03 ml   Output --   Net 820.03 ml      Vitals:   Vitals:    07/13/25 0515 07/13/25 0757 07/13/25 0802 07/13/25 0808   BP: (!) 142/72 (!) 186/81     Pulse:  76 76 76   Resp:  17 18 18   Temp:  98.6 °F (37 °C)     TempSrc:  Oral     SpO2:  95% 95% 95%   Weight:       Height:             Physical Exam:      General: NAD  Eyes: EOMI  ENT: neck supple  Cardiovascular: Regular rate.  Respiratory: wheezing +  Gastrointestinal: Soft, non tender  Genitourinary: no suprapubic tenderness  Musculoskeletal: No edema  Skin: warm, dry  Neuro: Alert.  Psych: Mood appropriate.         Medications:   Medications:    Vitamin D  2,000 Units Oral Daily    DULoxetine  30 mg Oral QPM    traZODone  50 mg Oral Nightly    DULoxetine  60 mg Oral Daily    budesonide-formoterol  2 puff Inhalation BID RT    aspirin  81 mg Oral Daily    atorvastatin  80 mg Oral Nightly    gabapentin  600 mg Oral BID    ipratropium 0.5 mg-albuterol 2.5 mg  1 Dose Inhalation 4x Daily RT    methylPREDNISolone  40 mg IntraVENous Q12H    doxycycline (VIBRAMYCIN) IV  100 mg IntraVENous Q12H    sodium chloride flush  5-40 mL IntraVENous 2 times per day    enoxaparin  40 mg SubCUTAneous Daily    
replacement **OR** potassium chloride, magnesium sulfate, ondansetron **OR** ondansetron, polyethylene glycol, acetaminophen **OR** acetaminophen    Labs:  CBC:   Recent Labs     25  1301 25  0727   WBC 2.5* 6.9   HGB 13.3 13.5   HCT 40.6 41.3   MCV 93.6 93.5    196     BMP:   Recent Labs     25  1301 25  0727    141   K 4.1 4.4    105   CO2 28 25   BUN 11 12   CREATININE 1.0 0.7     LIVER PROFILE:   Recent Labs     25  1301   AST 29   ALT 16   BILITOT 0.3   ALKPHOS 58     PT/INR: No results for input(s): \"PROTIME\", \"INR\" in the last 72 hours.  APTT: No results for input(s): \"APTT\" in the last 72 hours.  UA:No results for input(s): \"NITRITE\", \"COLORU\", \"PHUR\", \"LABCAST\", \"WBCUA\", \"RBCUA\", \"MUCUS\", \"TRICHOMONAS\", \"YEAST\", \"BACTERIA\", \"CLARITYU\", \"SPECGRAV\", \"LEUKOCYTESUR\", \"UROBILINOGEN\", \"BILIRUBINUR\", \"BLOODU\", \"GLUCOSEU\", \"AMORPHOUS\" in the last 72 hours.    Invalid input(s): \"KETONESU\"  No results for input(s): \"PH\", \"PCO2\", \"PO2\" in the last 72 hours.      Cultures:   None     PFTs:     Severe obstruction      ECHO:   Nothing recent     VB.3     Chest X-ray:  Chest imaging was reviewed by me and showed right upper lobe scar, hyperinflated lungs         I reviewed all the above labs and studies pertaining to this visit.       ASSESSMENT/PLAN:  Acute Hypoxic Respiratory Failure with saturations less than 90% on room air  Titrate oxygen for saturations greater than or equal to 90%  Might need home oxygen assessment prior to DC  COPD exacerbation  Symbicort q 12 hours  Duonebs q 4 hours   Add saline nebs for pulm toilet   Solumedrol   Vibramycin   Tobacco Abuse   Must quit      DVT prophylaxis  Lovenox      Follows with Greg     Probably one more day    D/W Dr Eugene Schmitz, [facility]    
Oral Daily    atorvastatin  80 mg Oral Nightly    gabapentin  600 mg Oral BID    ipratropium 0.5 mg-albuterol 2.5 mg  1 Dose Inhalation 4x Daily RT    doxycycline (VIBRAMYCIN) IV  100 mg IntraVENous Q12H    sodium chloride flush  5-40 mL IntraVENous 2 times per day    enoxaparin  40 mg SubCUTAneous Daily      Infusions:    sodium chloride       PRN Meds: cetirizine, 10 mg, Daily PRN  oxyCODONE-acetaminophen, 1 tablet, Q6H PRN  pantoprazole, 40 mg, Daily PRN  guaiFENesin-dextromethorphan, 5 mL, Q4H PRN  sodium chloride flush, 5-40 mL, PRN  sodium chloride, , PRN  potassium chloride, 40 mEq, PRN   Or  potassium alternative oral replacement, 40 mEq, PRN   Or  potassium chloride, 10 mEq, PRN  magnesium sulfate, 2,000 mg, PRN  ondansetron, 4 mg, Q8H PRN   Or  ondansetron, 4 mg, Q6H PRN  polyethylene glycol, 17 g, Daily PRN  acetaminophen, 650 mg, Q6H PRN   Or  acetaminophen, 650 mg, Q6H PRN        Labs and Imaging   XR CHEST (2 VW)  Result Date: 7/12/2025  EXAMINATION: TWO XRAY VIEWS OF THE CHEST 7/12/2025 12:44 pm COMPARISON: 03/27/2024 HISTORY: ORDERING SYSTEM PROVIDED HISTORY: SOB TECHNOLOGIST PROVIDED HISTORY: Reason for exam:->SOB Reason for Exam: Fatigue/ Shortness of breath/ Hx of COPD FINDINGS: There is some scarring noted in the right upper lobe, unchanged from prior examination.  There is no evidence for any acute infiltrate, CHF or pneumothorax.  The heart appears normal.  Bony structures appear normal.     1. No acute cardiopulmonary process. 2. Scarring in the right upper lobe.       CBC:   Recent Labs     07/12/25  1301 07/13/25  0727   WBC 2.5* 6.9   HGB 13.3 13.5    196     BMP:    Recent Labs     07/12/25  1301 07/13/25  0727    141   K 4.1 4.4    105   CO2 28 25   BUN 11 12   CREATININE 1.0 0.7   GLUCOSE 110* 148*     Hepatic:   Recent Labs     07/12/25  1301   AST 29   ALT 16   BILITOT 0.3   ALKPHOS 58     Lipids:   Lab Results   Component Value Date/Time    CHOL 210 01/17/2023 09:59 
Q12H    doxycycline (VIBRAMYCIN) IV  100 mg IntraVENous Q12H    sodium chloride flush  5-40 mL IntraVENous 2 times per day    enoxaparin  40 mg SubCUTAneous Daily      Infusions:    sodium chloride       PRN Meds: cetirizine, 10 mg, Daily PRN  oxyCODONE-acetaminophen, 1 tablet, Q6H PRN  pantoprazole, 40 mg, Daily PRN  guaiFENesin-dextromethorphan, 5 mL, Q4H PRN  sodium chloride flush, 5-40 mL, PRN  sodium chloride, , PRN  potassium chloride, 40 mEq, PRN   Or  potassium alternative oral replacement, 40 mEq, PRN   Or  potassium chloride, 10 mEq, PRN  magnesium sulfate, 2,000 mg, PRN  ondansetron, 4 mg, Q8H PRN   Or  ondansetron, 4 mg, Q6H PRN  polyethylene glycol, 17 g, Daily PRN  acetaminophen, 650 mg, Q6H PRN   Or  acetaminophen, 650 mg, Q6H PRN        Labs and Imaging   XR CHEST (2 VW)  Result Date: 7/12/2025  EXAMINATION: TWO XRAY VIEWS OF THE CHEST 7/12/2025 12:44 pm COMPARISON: 03/27/2024 HISTORY: ORDERING SYSTEM PROVIDED HISTORY: SOB TECHNOLOGIST PROVIDED HISTORY: Reason for exam:->SOB Reason for Exam: Fatigue/ Shortness of breath/ Hx of COPD FINDINGS: There is some scarring noted in the right upper lobe, unchanged from prior examination.  There is no evidence for any acute infiltrate, CHF or pneumothorax.  The heart appears normal.  Bony structures appear normal.     1. No acute cardiopulmonary process. 2. Scarring in the right upper lobe.       CBC:   Recent Labs     07/12/25  1301 07/13/25  0727   WBC 2.5* 6.9   HGB 13.3 13.5    196     BMP:    Recent Labs     07/12/25  1301 07/13/25  0727    141   K 4.1 4.4    105   CO2 28 25   BUN 11 12   CREATININE 1.0 0.7   GLUCOSE 110* 148*     Hepatic:   Recent Labs     07/12/25  1301   AST 29   ALT 16   BILITOT 0.3   ALKPHOS 58     Lipids:   Lab Results   Component Value Date/Time    CHOL 210 01/17/2023 09:59 AM    HDL 53 04/29/2025 11:31 AM    TRIG 139 01/17/2023 09:59 AM     Hemoglobin A1C:   Lab Results   Component Value Date/Time    LABA1C 5.9

## 2025-07-16 NOTE — DISCHARGE SUMMARY
7/15/2025  1:57 PM             Details   predniSONE (DELTASONE) 20 MG tablet Take 2 tablets by mouth daily for 5 doses, Disp-10 tablet, R-0Normal      guaiFENesin-dextromethorphan (ROBITUSSIN DM) 100-10 MG/5ML syrup Take 5 mLs by mouth every 4 hours as needed for Cough, Disp-120 mL, R-0Normal      budesonide-formoterol (SYMBICORT) 160-4.5 MCG/ACT AERO Inhale 2 puffs into the lungs in the morning and 2 puffs in the evening., Disp-10.2 g, R-3Normal                Details   atorvastatin (LIPITOR) 80 MG tablet TAKE 1 TABLET BY MOUTH EVERY DAY FOR CHOLESTEROL, Disp-90 tablet, R-1Normal      gabapentin (NEURONTIN) 600 MG tablet Take 1 tablet by mouth 2 times daily for 60 days., Disp-60 tablet, R-1Normal      oxyCODONE-acetaminophen (PERCOCET) 7.5-325 MG per tablet Take 1 tablet by mouth every 6 hours as needed for Pain (max 3-4) for up to 28 days. !!! Do not fill until 06/29/2025!!!, Disp-100 tablet, R-0Normal      pantoprazole (PROTONIX) 40 MG tablet Take 1 tablet by mouth daily as needed (GERD symptoms), Disp-30 tablet, R-1Normal      traZODone (DESYREL) 50 MG tablet Take 1-2 tablets by mouth nightly, Disp-60 tablet, R-1Normal      !! DULoxetine (CYMBALTA) 30 MG extended release capsule TAKE 1 CAPSULE BY MOUTH IN THE EVENING, Disp-90 capsule, R-0Normal      !! DULoxetine (CYMBALTA) 60 MG extended release capsule TAKE 1 CAPSULE BY MOUTH EVERY DAY, Disp-90 capsule, R-1Normal      naloxone (NARCAN) 4 MG/0.1ML LIQD nasal spray 1 spray by Nasal route as needed for Opioid Reversal, Disp-1 each, R-0Normal      fluticasone furoate-vilanterol (BREO ELLIPTA) 200-25 MCG/ACT AEPB inhaler Inhale 1 puff into the lungs daily, Disp-1 each, R-11Normal      albuterol sulfate HFA (PROAIR HFA) 108 (90 Base) MCG/ACT inhaler Inhale 2 puffs into the lungs every 6 hours as needed for Wheezing or Shortness of Breath, Disp-18 g, R-11Normal      ipratropium-albuterol (DUONEB) 0.5-2.5 (3) MG/3ML SOLN nebulizer solution Take 1 aerosol every 4-6

## 2025-07-18 ENCOUNTER — OFFICE VISIT (OUTPATIENT)
Dept: FAMILY MEDICINE CLINIC | Age: 80
End: 2025-07-18

## 2025-07-18 VITALS
HEART RATE: 76 BPM | OXYGEN SATURATION: 96 % | DIASTOLIC BLOOD PRESSURE: 56 MMHG | SYSTOLIC BLOOD PRESSURE: 108 MMHG | RESPIRATION RATE: 22 BRPM | HEIGHT: 64 IN | BODY MASS INDEX: 21.85 KG/M2 | TEMPERATURE: 98.8 F | WEIGHT: 128 LBS

## 2025-07-18 DIAGNOSIS — J44.1 COPD EXACERBATION (HCC): ICD-10-CM

## 2025-07-18 DIAGNOSIS — B37.0 THRUSH: ICD-10-CM

## 2025-07-18 DIAGNOSIS — Z09 HOSPITAL DISCHARGE FOLLOW-UP: Primary | ICD-10-CM

## 2025-07-18 RX ORDER — NYSTATIN 100000 [USP'U]/ML
500000 SUSPENSION ORAL 4 TIMES DAILY
Qty: 200 ML | Refills: 0 | Status: SHIPPED | OUTPATIENT
Start: 2025-07-18 | End: 2025-07-28

## 2025-07-18 RX ORDER — MELATONIN 5 MG
5 TABLET,CHEWABLE ORAL NIGHTLY PRN
COMMUNITY

## 2025-07-18 NOTE — PROGRESS NOTES
days.  - Should brush teeth twice daily, clean tongue thoroughly, and rinse mouth after using inhaler.    Follow-up  - Follow-up appointment scheduled for 12/2025 unless an earlier appointment is necessary.       Return if symptoms worsen or fail to improve.           Subjective:   HPI  Chief Complaint   Patient presents with    Follow-Up from Hospital     Went to er for sob.  Was admitted.  Discharged 7/15/25. Doing much better.  Still a little weak.      \"Hospital Course:      Lalita Pimentel is a 79 y.o. female with pmh of COPD , Active smoker who presents with      C/o SOB     C/o cough     C/o fatigue and generalized weakness since last 3 days         Denies abdominal pain , nausea, vomiting , overt GI bleed , fever , chills         .denies chest pain     Her O2 sat in 70 % on RA as per EMS  and improved to 94 % on 4 L O2 via NC     Acute Hypoxic Respiratory Failure with saturations less than 90% on room air  Titrate oxygen for saturations greater than or equal to 90%  Might need home oxygen assessment prior to DC  COPD exacerbation  Symbicort q 12 hours  Duonebs q 4 hours   Saline nebs for pulm toilet   DC Solumedrol.  Prednisone tomorrow   Vibramycin   Tobacco Abuse   Must quit\"     Inpatient course: Discharge summary reviewed- see chart.    Interval history/Current status:     History of Present Illness  The patient is a 79-year-old female who presents today for a hospital follow-up from 07/12/2025 to 07/15/2025 due to a COPD exacerbation.    She experienced a decline in her health for 2 to 3 days prior to her hospital admission, characterized by increased fatigue and lethargy. Her sleep pattern was disrupted, often falling asleep in a chair or needing to nap for 2 to 3 hours. Her daughter, who is not a nurse but understands her mother's health conditions, was present during this time. She was admitted to the hospital after her daughter called 911, where her oxygen level was found to be at 70 percent. After

## 2025-07-21 ENCOUNTER — OFFICE VISIT (OUTPATIENT)
Dept: PULMONOLOGY | Age: 80
End: 2025-07-21
Payer: MEDICARE

## 2025-07-21 VITALS
BODY MASS INDEX: 21.61 KG/M2 | OXYGEN SATURATION: 91 % | RESPIRATION RATE: 18 BRPM | WEIGHT: 126.6 LBS | HEIGHT: 64 IN | DIASTOLIC BLOOD PRESSURE: 72 MMHG | SYSTOLIC BLOOD PRESSURE: 110 MMHG | TEMPERATURE: 98.1 F

## 2025-07-21 DIAGNOSIS — J44.9 COPD, SEVERE (HCC): Primary | ICD-10-CM

## 2025-07-21 DIAGNOSIS — J30.89 OTHER ALLERGIC RHINITIS: ICD-10-CM

## 2025-07-21 PROCEDURE — 1159F MED LIST DOCD IN RCRD: CPT | Performed by: INTERNAL MEDICINE

## 2025-07-21 PROCEDURE — 1111F DSCHRG MED/CURRENT MED MERGE: CPT | Performed by: INTERNAL MEDICINE

## 2025-07-21 PROCEDURE — 1036F TOBACCO NON-USER: CPT | Performed by: INTERNAL MEDICINE

## 2025-07-21 PROCEDURE — 99214 OFFICE O/P EST MOD 30 MIN: CPT | Performed by: INTERNAL MEDICINE

## 2025-07-21 PROCEDURE — 1123F ACP DISCUSS/DSCN MKR DOCD: CPT | Performed by: INTERNAL MEDICINE

## 2025-07-21 PROCEDURE — G8420 CALC BMI NORM PARAMETERS: HCPCS | Performed by: INTERNAL MEDICINE

## 2025-07-21 PROCEDURE — G8427 DOCREV CUR MEDS BY ELIG CLIN: HCPCS | Performed by: INTERNAL MEDICINE

## 2025-07-21 PROCEDURE — 1090F PRES/ABSN URINE INCON ASSESS: CPT | Performed by: INTERNAL MEDICINE

## 2025-07-21 PROCEDURE — G8399 PT W/DXA RESULTS DOCUMENT: HCPCS | Performed by: INTERNAL MEDICINE

## 2025-07-21 PROCEDURE — 3023F SPIROM DOC REV: CPT | Performed by: INTERNAL MEDICINE

## 2025-07-21 NOTE — PROGRESS NOTES
REASON FOR CONSULTATION/CC: COPD          PCP: Belkis Silva, APRN - CNP    HISTORY OF PRESENT ILLNESS: Lalita Pimentel is a 79 y.o. year old female with a history of COPD who presents :           COPD Assessment Test (CAT)                                             The patient (or guardian, if applicable) and other individuals in attendance with the patient were advised that Artificial Intelligence will be utilized during this visit to record, process the conversation to generate a clinical note, and support improvement of the AI technology. The patient (or guardian, if applicable) and other individuals in attendance at the appointment consented to the use of AI, including the recording.      History of Present Illness  The patient is a 79-year-old female who presents for a follow-up visit.    Weakness and Mobility Issues  - The patient reports feeling slightly weak.  - She has not been engaging in home physical therapy.  - Her mobility is limited, as she only walks when necessary due to her weakness.  - She expresses a fear of falling.    Respiratory Symptoms  - She has resumed her Breo and DuoNebs treatments without any complications.  - Her wheezing symptoms have returned to their usual state.    Sinus Condition  - She continues to take Zyrtec for her sinus condition without any issues.    SOCIAL HISTORY  Hobbies: Enjoying the summer                       Objective:   PHYSICAL EXAM:  Blood pressure 110/72, temperature 98.1 °F (36.7 °C), temperature source Temporal, resp. rate 18, height 1.626 m (5' 4\"), weight 57.4 kg (126 lb 9.6 oz), SpO2 91%, not currently breastfeeding.'    Physical Exam  Lungs: Mild wheezing noted on auscultation, no significant abnormalities detected.       Gen: No distress.    M/S:   No clubbing.   Neuro: Moves all four extremities.    Psych: Oriented x 3. No anxiety.  Awake. Alert. Intact judgement and insight.        Data Reviewed:      Results  Labs   - Infection number: High but

## 2025-07-22 ENCOUNTER — OFFICE VISIT (OUTPATIENT)
Dept: PAIN MANAGEMENT | Age: 80
End: 2025-07-22
Payer: MEDICARE

## 2025-07-22 VITALS
OXYGEN SATURATION: 91 % | BODY MASS INDEX: 22.14 KG/M2 | HEART RATE: 104 BPM | WEIGHT: 129 LBS | SYSTOLIC BLOOD PRESSURE: 95 MMHG | DIASTOLIC BLOOD PRESSURE: 65 MMHG

## 2025-07-22 DIAGNOSIS — M47.26 OSTEOARTHRITIS OF SPINE WITH RADICULOPATHY, LUMBAR REGION: ICD-10-CM

## 2025-07-22 DIAGNOSIS — M50.30 DEGENERATIVE DISC DISEASE, CERVICAL: ICD-10-CM

## 2025-07-22 DIAGNOSIS — Z91.89 AT RISK FOR RESPIRATORY DEPRESSION DUE TO OPIOID: ICD-10-CM

## 2025-07-22 DIAGNOSIS — G89.4 CHRONIC PAIN SYNDROME: ICD-10-CM

## 2025-07-22 DIAGNOSIS — M70.61 TROCHANTERIC BURSITIS OF RIGHT HIP: ICD-10-CM

## 2025-07-22 DIAGNOSIS — M51.362 DEGENERATION OF INTERVERTEBRAL DISC OF LUMBAR REGION WITH DISCOGENIC BACK PAIN AND LOWER EXTREMITY PAIN: ICD-10-CM

## 2025-07-22 DIAGNOSIS — M79.7 FIBROMYALGIA: ICD-10-CM

## 2025-07-22 DIAGNOSIS — M47.816 LUMBAR SPONDYLOSIS: ICD-10-CM

## 2025-07-22 PROCEDURE — 1111F DSCHRG MED/CURRENT MED MERGE: CPT | Performed by: INTERNAL MEDICINE

## 2025-07-22 PROCEDURE — 1159F MED LIST DOCD IN RCRD: CPT | Performed by: INTERNAL MEDICINE

## 2025-07-22 PROCEDURE — 1123F ACP DISCUSS/DSCN MKR DOCD: CPT | Performed by: INTERNAL MEDICINE

## 2025-07-22 PROCEDURE — G8399 PT W/DXA RESULTS DOCUMENT: HCPCS | Performed by: INTERNAL MEDICINE

## 2025-07-22 PROCEDURE — 1090F PRES/ABSN URINE INCON ASSESS: CPT | Performed by: INTERNAL MEDICINE

## 2025-07-22 PROCEDURE — 1036F TOBACCO NON-USER: CPT | Performed by: INTERNAL MEDICINE

## 2025-07-22 PROCEDURE — G8420 CALC BMI NORM PARAMETERS: HCPCS | Performed by: INTERNAL MEDICINE

## 2025-07-22 PROCEDURE — G8427 DOCREV CUR MEDS BY ELIG CLIN: HCPCS | Performed by: INTERNAL MEDICINE

## 2025-07-22 PROCEDURE — 99213 OFFICE O/P EST LOW 20 MIN: CPT | Performed by: INTERNAL MEDICINE

## 2025-07-22 NOTE — PROGRESS NOTES
Nasal route as needed for Opioid Reversal 1 each 0    oxyCODONE-acetaminophen (PERCOCET) 7.5-325 MG per tablet Take 1 tablet by mouth every 6 hours as needed for Pain (max 3-4) for up to 30 days. 120 tablet 0    pantoprazole (PROTONIX) 40 MG tablet Take 1 tablet by mouth daily as needed (GERD symptoms) 30 tablet 1    Melatonin 5 MG CHEW Take 5 mg by mouth nightly as needed      nystatin (MYCOSTATIN) 953484 UNIT/ML suspension Take 5 mLs by mouth 4 times daily for 10 days Retain in mouth as long as possible 200 mL 0    guaiFENesin-dextromethorphan (ROBITUSSIN DM) 100-10 MG/5ML syrup Take 5 mLs by mouth every 4 hours as needed for Cough 120 mL 0    budesonide-formoterol (SYMBICORT) 160-4.5 MCG/ACT AERO Inhale 2 puffs into the lungs in the morning and 2 puffs in the evening. 10.2 g 3    atorvastatin (LIPITOR) 80 MG tablet TAKE 1 TABLET BY MOUTH EVERY DAY FOR CHOLESTEROL 90 tablet 1    traZODone (DESYREL) 50 MG tablet Take 1-2 tablets by mouth nightly 60 tablet 1    DULoxetine (CYMBALTA) 30 MG extended release capsule TAKE 1 CAPSULE BY MOUTH IN THE EVENING 90 capsule 0    DULoxetine (CYMBALTA) 60 MG extended release capsule TAKE 1 CAPSULE BY MOUTH EVERY DAY 90 capsule 1    fluticasone furoate-vilanterol (BREO ELLIPTA) 200-25 MCG/ACT AEPB inhaler Inhale 1 puff into the lungs daily 1 each 11    albuterol sulfate HFA (PROAIR HFA) 108 (90 Base) MCG/ACT inhaler Inhale 2 puffs into the lungs every 6 hours as needed for Wheezing or Shortness of Breath 18 g 11    ipratropium-albuterol (DUONEB) 0.5-2.5 (3) MG/3ML SOLN nebulizer solution Take 1 aerosol every 4-6 hours as needed for shortness of breath coughing and wheezing 360 mL 0    aspirin 81 MG EC tablet Take 1 tablet by mouth in the morning. 30 tablet 3    ONE TOUCH LANCETS MISC 1 each by Does not apply route daily 100 each 3    Cholecalciferol (VITAMIN D) 2000 units TABS tablet Take 1 tablet by mouth daily 30 tablet     cetirizine (ZYRTEC) 10 MG tablet Take 1 tablet by mouth

## 2025-07-23 RX ORDER — OXYCODONE AND ACETAMINOPHEN 7.5; 325 MG/1; MG/1
1 TABLET ORAL EVERY 6 HOURS PRN
Qty: 120 TABLET | Refills: 0 | Status: SHIPPED | OUTPATIENT
Start: 2025-07-23 | End: 2025-08-22

## 2025-07-23 RX ORDER — PANTOPRAZOLE SODIUM 40 MG/1
40 TABLET, DELAYED RELEASE ORAL DAILY PRN
Qty: 30 TABLET | Refills: 1 | Status: SHIPPED | OUTPATIENT
Start: 2025-07-23

## 2025-07-23 RX ORDER — GABAPENTIN 600 MG/1
600 TABLET ORAL 2 TIMES DAILY
Qty: 60 TABLET | Refills: 1 | Status: SHIPPED | OUTPATIENT
Start: 2025-07-23 | End: 2025-09-21

## 2025-07-25 DIAGNOSIS — F33.1 MODERATE EPISODE OF RECURRENT MAJOR DEPRESSIVE DISORDER (HCC): ICD-10-CM

## 2025-07-28 RX ORDER — DULOXETIN HYDROCHLORIDE 30 MG/1
CAPSULE, DELAYED RELEASE ORAL
Qty: 90 CAPSULE | Refills: 1 | Status: SHIPPED | OUTPATIENT
Start: 2025-07-28

## 2025-08-22 DIAGNOSIS — G89.4 CHRONIC PAIN SYNDROME: ICD-10-CM

## 2025-08-22 DIAGNOSIS — F33.1 MODERATE EPISODE OF RECURRENT MAJOR DEPRESSIVE DISORDER (HCC): ICD-10-CM

## 2025-08-25 RX ORDER — DULOXETIN HYDROCHLORIDE 60 MG/1
CAPSULE, DELAYED RELEASE ORAL DAILY
Qty: 90 CAPSULE | Refills: 1 | Status: SHIPPED | OUTPATIENT
Start: 2025-08-25

## 2025-08-26 ENCOUNTER — OFFICE VISIT (OUTPATIENT)
Dept: PAIN MANAGEMENT | Age: 80
End: 2025-08-26
Payer: MEDICARE

## 2025-08-26 VITALS — WEIGHT: 132 LBS | OXYGEN SATURATION: 93 % | BODY MASS INDEX: 22.53 KG/M2 | HEIGHT: 64 IN | HEART RATE: 89 BPM

## 2025-08-26 DIAGNOSIS — M79.7 FIBROMYALGIA: ICD-10-CM

## 2025-08-26 DIAGNOSIS — M47.26 OSTEOARTHRITIS OF SPINE WITH RADICULOPATHY, LUMBAR REGION: ICD-10-CM

## 2025-08-26 DIAGNOSIS — M50.30 DEGENERATIVE DISC DISEASE, CERVICAL: ICD-10-CM

## 2025-08-26 DIAGNOSIS — F51.01 PRIMARY INSOMNIA: ICD-10-CM

## 2025-08-26 DIAGNOSIS — M70.61 TROCHANTERIC BURSITIS OF RIGHT HIP: ICD-10-CM

## 2025-08-26 DIAGNOSIS — G89.4 CHRONIC PAIN SYNDROME: ICD-10-CM

## 2025-08-26 DIAGNOSIS — M51.362 DEGENERATION OF INTERVERTEBRAL DISC OF LUMBAR REGION WITH DISCOGENIC BACK PAIN AND LOWER EXTREMITY PAIN: ICD-10-CM

## 2025-08-26 DIAGNOSIS — M47.816 LUMBAR SPONDYLOSIS: ICD-10-CM

## 2025-08-26 PROCEDURE — G8399 PT W/DXA RESULTS DOCUMENT: HCPCS | Performed by: INTERNAL MEDICINE

## 2025-08-26 PROCEDURE — G8420 CALC BMI NORM PARAMETERS: HCPCS | Performed by: INTERNAL MEDICINE

## 2025-08-26 PROCEDURE — 1159F MED LIST DOCD IN RCRD: CPT | Performed by: INTERNAL MEDICINE

## 2025-08-26 PROCEDURE — 1090F PRES/ABSN URINE INCON ASSESS: CPT | Performed by: INTERNAL MEDICINE

## 2025-08-26 PROCEDURE — 1123F ACP DISCUSS/DSCN MKR DOCD: CPT | Performed by: INTERNAL MEDICINE

## 2025-08-26 PROCEDURE — G8427 DOCREV CUR MEDS BY ELIG CLIN: HCPCS | Performed by: INTERNAL MEDICINE

## 2025-08-26 PROCEDURE — 99214 OFFICE O/P EST MOD 30 MIN: CPT | Performed by: INTERNAL MEDICINE

## 2025-08-26 PROCEDURE — 4004F PT TOBACCO SCREEN RCVD TLK: CPT | Performed by: INTERNAL MEDICINE

## 2025-08-26 RX ORDER — GABAPENTIN 600 MG/1
600 TABLET ORAL 2 TIMES DAILY
Qty: 60 TABLET | Refills: 1 | Status: SHIPPED | OUTPATIENT
Start: 2025-08-26 | End: 2025-10-25

## 2025-08-26 RX ORDER — OXYCODONE AND ACETAMINOPHEN 7.5; 325 MG/1; MG/1
1 TABLET ORAL EVERY 6 HOURS PRN
Qty: 120 TABLET | Refills: 0 | Status: SHIPPED | OUTPATIENT
Start: 2025-08-26 | End: 2025-09-25

## 2025-08-26 RX ORDER — MELOXICAM 15 MG/1
TABLET ORAL
Qty: 30 TABLET | Refills: 0 | Status: SHIPPED | OUTPATIENT
Start: 2025-08-26

## (undated) DEVICE — GLOVE SURG SZ 7.5 L11.73IN FNGR THK9.8MIL STRW LTX POLYMER

## (undated) DEVICE — Device

## (undated) DEVICE — SINGLE USE SUCTION VALVE MAJ-209: Brand: SINGLE USE SUCTION VALVE (STERILE)

## (undated) DEVICE — SOLUTION IRRIGATION BAL SALT SOLUTION 15 ML STRL BSS

## (undated) DEVICE — MASK, AEROSOL, ELONGATED, ADULT: Brand: MEDLINE

## (undated) DEVICE — SYRINGE 30CC LUER LOCK: Brand: CARDINAL HEALTH

## (undated) DEVICE — NEBULIZER,KIT,T-MOUTHPIECE,6"RESER,7'TUB: Brand: MEDLINE

## (undated) DEVICE — MICROSURGICAL INSTRUMENT "J" SHAPED CANNULA 27GA: Brand: ALCON

## (undated) DEVICE — MAJ-1414 SINGLE USE ADPATER BIOPSY VALV: Brand: SINGLE USE ADAPTOR BIOPSY VALVE

## (undated) DEVICE — SOLUTION IRRIG BSS ST 500ML

## (undated) DEVICE — PACK PHACO 0.9MM 45DEG BAL FMS IRR ASPIR CENTURION

## (undated) DEVICE — STOPCOCK IV PRIMING 0.26ML 3 W W/ TWO FEM LUERLOK PRT 1

## (undated) DEVICE — SYRINGE MED 10ML POLYPR LUERSLIP TIP FLAT TOP W/O SFTY DISP

## (undated) DEVICE — COVER,MAYO STAND,STERILE: Brand: MEDLINE

## (undated) DEVICE — SOLUTION IV IRRIG WATER 500ML POUR BRL ST 2F7113

## (undated) DEVICE — SINGLE USE BIOPSY VALVE MAJ-210: Brand: SINGLE USE BIOPSY VALVE (STERILE)

## (undated) DEVICE — 60 ML SYRINGE REGULAR TIP: Brand: MONOJECT